# Patient Record
Sex: FEMALE | Race: WHITE | NOT HISPANIC OR LATINO | Employment: PART TIME | URBAN - METROPOLITAN AREA
[De-identification: names, ages, dates, MRNs, and addresses within clinical notes are randomized per-mention and may not be internally consistent; named-entity substitution may affect disease eponyms.]

---

## 2019-11-29 ENCOUNTER — APPOINTMENT (OUTPATIENT)
Dept: CT IMAGING | Facility: HOSPITAL | Age: 59
End: 2019-11-29

## 2019-11-29 ENCOUNTER — HOSPITAL ENCOUNTER (EMERGENCY)
Facility: HOSPITAL | Age: 59
Discharge: HOME | End: 2019-11-29
Admitting: EMERGENCY MEDICINE

## 2019-11-29 VITALS
WEIGHT: 152 LBS | RESPIRATION RATE: 18 BRPM | HEIGHT: 61 IN | OXYGEN SATURATION: 98 % | DIASTOLIC BLOOD PRESSURE: 98 MMHG | SYSTOLIC BLOOD PRESSURE: 136 MMHG | HEART RATE: 87 BPM | BODY MASS INDEX: 28.7 KG/M2 | TEMPERATURE: 98.8 F

## 2019-11-29 DIAGNOSIS — S22.080A COMPRESSION FRACTURE OF T12 VERTEBRA, INITIAL ENCOUNTER (HCC): ICD-10-CM
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
## 2019-11-29 DIAGNOSIS — S16.1XXA STRAIN OF NECK MUSCLE, INITIAL ENCOUNTER: ICD-10-CM

## 2019-11-29 DIAGNOSIS — S06.0X0A CONCUSSION WITHOUT LOSS OF CONSCIOUSNESS, INITIAL ENCOUNTER: ICD-10-CM

## 2019-11-29 LAB
ANION GAP SERPL CALCULATED.3IONS-SCNC: 12 MMOL/L (ref 5–15)
BUN BLD-MCNC: 18 MG/DL (ref 6–20)
BUN/CREAT SERPL: 27.7 (ref 7–25)
CALCIUM SPEC-SCNC: 9.6 MG/DL (ref 8.6–10.5)
CHLORIDE SERPL-SCNC: 100 MMOL/L (ref 98–107)
CO2 SERPL-SCNC: 24 MMOL/L (ref 22–29)
CREAT BLD-MCNC: 0.65 MG/DL (ref 0.57–1)
GFR SERPL CREATININE-BSD FRML MDRD: 94 ML/MIN/1.73
GLUCOSE BLD-MCNC: 266 MG/DL (ref 65–99)
POTASSIUM BLD-SCNC: 4.3 MMOL/L (ref 3.5–5.2)
SODIUM BLD-SCNC: 136 MMOL/L (ref 136–145)

## 2019-11-29 PROCEDURE — 74177 CT ABD & PELVIS W/CONTRAST: CPT

## 2019-11-29 PROCEDURE — 72125 CT NECK SPINE W/O DYE: CPT

## 2019-11-29 PROCEDURE — 0 IOPAMIDOL PER 1 ML: Performed by: PHYSICIAN ASSISTANT

## 2019-11-29 PROCEDURE — 70450 CT HEAD/BRAIN W/O DYE: CPT

## 2019-11-29 PROCEDURE — 80048 BASIC METABOLIC PNL TOTAL CA: CPT | Performed by: PHYSICIAN ASSISTANT

## 2019-11-29 PROCEDURE — 99283 EMERGENCY DEPT VISIT LOW MDM: CPT

## 2019-11-29 RX ORDER — LIDOCAINE 50 MG/G
1 PATCH TOPICAL ONCE
Status: DISCONTINUED | OUTPATIENT
Start: 2019-11-29 | End: 2019-11-29 | Stop reason: HOSPADM

## 2019-11-29 RX ORDER — HYDROCODONE BITARTRATE AND ACETAMINOPHEN 5; 325 MG/1; MG/1
1 TABLET ORAL ONCE AS NEEDED
Status: DISCONTINUED | OUTPATIENT
Start: 2019-11-29 | End: 2019-11-29 | Stop reason: HOSPADM

## 2019-11-29 RX ORDER — HYDROCODONE BITARTRATE AND ACETAMINOPHEN 7.5; 325 MG/1; MG/1
1-2 TABLET ORAL EVERY 6 HOURS PRN
Qty: 15 TABLET | Refills: 0 | Status: SHIPPED | OUTPATIENT
Start: 2019-11-29 | End: 2019-12-05 | Stop reason: SDUPTHER

## 2019-11-29 RX ORDER — SODIUM CHLORIDE 0.9 % (FLUSH) 0.9 %
10 SYRINGE (ML) INJECTION AS NEEDED
Status: DISCONTINUED | OUTPATIENT
Start: 2019-11-29 | End: 2019-11-29 | Stop reason: HOSPADM

## 2019-11-29 RX ORDER — LIDOCAINE 50 MG/G
1 PATCH TOPICAL EVERY 24 HOURS
Qty: 6 EACH | Refills: 0 | Status: SHIPPED | OUTPATIENT
Start: 2019-11-29 | End: 2019-12-22

## 2019-11-29 RX ADMIN — HYDROCODONE BITARTRATE AND ACETAMINOPHEN 1 TABLET: 5; 325 TABLET ORAL at 13:44

## 2019-11-29 RX ADMIN — IOPAMIDOL 100 ML: 755 INJECTION, SOLUTION INTRAVENOUS at 15:39

## 2019-11-29 RX ADMIN — SODIUM CHLORIDE 500 ML: 900 INJECTION, SOLUTION INTRAVENOUS at 14:18

## 2019-11-29 RX ADMIN — LIDOCAINE 1 PATCH: 50 PATCH CUTANEOUS at 13:45

## 2019-12-05 ENCOUNTER — OFFICE VISIT (OUTPATIENT)
Dept: ORTHOPEDIC SURGERY | Facility: CLINIC | Age: 59
End: 2019-12-05

## 2019-12-05 VITALS
WEIGHT: 159 LBS | HEIGHT: 61 IN | HEART RATE: 76 BPM | SYSTOLIC BLOOD PRESSURE: 144 MMHG | BODY MASS INDEX: 30.02 KG/M2 | DIASTOLIC BLOOD PRESSURE: 85 MMHG

## 2019-12-05 DIAGNOSIS — M48.54XA PATHOLOGIC COMPRESSION FRACTURE OF THORACIC VERTEBRA, INITIAL ENCOUNTER (HCC): ICD-10-CM

## 2019-12-05 DIAGNOSIS — M54.6 ACUTE BILATERAL THORACIC BACK PAIN: Primary | ICD-10-CM

## 2019-12-05 PROCEDURE — 99204 OFFICE O/P NEW MOD 45 MIN: CPT | Performed by: PHYSICIAN ASSISTANT

## 2019-12-05 RX ORDER — HYDROCODONE BITARTRATE AND ACETAMINOPHEN 7.5; 325 MG/1; MG/1
1 TABLET ORAL EVERY 6 HOURS PRN
Qty: 28 TABLET | Refills: 0 | Status: SHIPPED | OUTPATIENT
Start: 2019-12-05 | End: 2019-12-22
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
## 2019-12-10 ENCOUNTER — TELEPHONE (OUTPATIENT)
Dept: ORTHOPEDIC SURGERY | Facility: CLINIC | Age: 59
End: 2019-12-10

## 2019-12-10 NOTE — TELEPHONE ENCOUNTER
Voicemail from patient stating not heard from anyone regarding the MRI and her follow up.  Also stating that the pain medication she was given is making her sick and wanted to know if she could have a muscle relaxer instead since she is having muscle spasms.     Referral updated for scheduling to contact patient to set up MRI. Please advise regarding the muscle relaxer. Thank you!

## 2019-12-11 RX ORDER — BACLOFEN 10 MG/1
10 TABLET ORAL 3 TIMES DAILY
Qty: 60 TABLET | Refills: 0 | Status: SHIPPED | OUTPATIENT
Start: 2019-12-11 | End: 2020-01-10

## 2019-12-11 NOTE — TELEPHONE ENCOUNTER
Call placed to patient to advise script sent to pharmacy for the muscle relaxer. Advised that she does not have to continue the pain medication if she does not wish to but could try cutting in half to see if tolerate better. Advised that our scheduling dept with hospital should reach out to her to set up the MRI but she can call [phone] to set up if she would prefer. Advised that she would need to call our office back to make a follow up to review the MRI with Ed. Also advised that we recommend she continue to wear the brace until follow up if she could tolerate it. Advised to call us back with any other questions

## 2019-12-22 ENCOUNTER — HOSPITAL ENCOUNTER (OUTPATIENT)
Facility: HOSPITAL | Age: 59
Setting detail: OBSERVATION
Discharge: HOME | End: 2019-12-23
Attending: EMERGENCY MEDICINE | Admitting: INTERNAL MEDICINE

## 2019-12-22 ENCOUNTER — APPOINTMENT (OUTPATIENT)
Dept: GENERAL RADIOLOGY | Facility: HOSPITAL | Age: 59
End: 2019-12-22

## 2019-12-22 DIAGNOSIS — R07.9 CHEST PAIN, UNSPECIFIED TYPE: Primary | ICD-10-CM

## 2019-12-22 DIAGNOSIS — IMO0001 UNCONTROLLED INSULIN DEPENDENT DIABETES MELLITUS: ICD-10-CM

## 2019-12-22 LAB
ANION GAP SERPL CALCULATED.3IONS-SCNC: 12 MMOL/L (ref 5–15)
BASOPHILS # BLD AUTO: 0.1 10*3/MM3 (ref 0–0.2)
BASOPHILS NFR BLD AUTO: 1.2 % (ref 0–1.5)
BUN BLD-MCNC: 21 MG/DL (ref 6–20)
BUN/CREAT SERPL: 31.8 (ref 7–25)
CALCIUM SPEC-SCNC: 9.7 MG/DL (ref 8.6–10.5)
CHLORIDE SERPL-SCNC: 103 MMOL/L (ref 98–107)
CO2 SERPL-SCNC: 22 MMOL/L (ref 22–29)
CREAT BLD-MCNC: 0.66 MG/DL (ref 0.57–1)
D DIMER PPP FEU-MCNC: 0.3 MCGFEU/ML (ref 0.17–0.59)
DEPRECATED RDW RBC AUTO: 38.9 FL (ref 37–54)
EOSINOPHIL # BLD AUTO: 0.1 10*3/MM3 (ref 0–0.4)
EOSINOPHIL NFR BLD AUTO: 1.3 % (ref 0.3–6.2)
ERYTHROCYTE [DISTWIDTH] IN BLOOD BY AUTOMATED COUNT: 12.7 % (ref 12.3–15.4)
GFR SERPL CREATININE-BSD FRML MDRD: 92 ML/MIN/1.73
GLUCOSE BLD-MCNC: 410 MG/DL (ref 65–99)
GLUCOSE BLDC GLUCOMTR-MCNC: 226 MG/DL (ref 70–105)
GLUCOSE BLDC GLUCOMTR-MCNC: 297 MG/DL (ref 70–105)
HCT VFR BLD AUTO: 40.6 % (ref 34–46.6)
HGB BLD-MCNC: 13.9 G/DL (ref 12–15.9)
LYMPHOCYTES # BLD AUTO: 1.4 10*3/MM3 (ref 0.7–3.1)
LYMPHOCYTES NFR BLD AUTO: 22.2 % (ref 19.6–45.3)
MCH RBC QN AUTO: 29.3 PG (ref 26.6–33)
MCHC RBC AUTO-ENTMCNC: 34.3 G/DL (ref 31.5–35.7)
MCV RBC AUTO: 85.2 FL (ref 79–97)
MONOCYTES # BLD AUTO: 0.4 10*3/MM3 (ref 0.1–0.9)
MONOCYTES NFR BLD AUTO: 7 % (ref 5–12)
NEUTROPHILS # BLD AUTO: 4.1 10*3/MM3 (ref 1.7–7)
NEUTROPHILS NFR BLD AUTO: 68.3 % (ref 42.7–76)
NRBC BLD AUTO-RTO: 0 /100 WBC (ref 0–0.2)
PLATELET # BLD AUTO: 115 10*3/MM3 (ref 140–450)
PMV BLD AUTO: 9 FL (ref 6–12)
POTASSIUM BLD-SCNC: 4.3 MMOL/L (ref 3.5–5.2)
RBC # BLD AUTO: 4.76 10*6/MM3 (ref 3.77–5.28)
SODIUM BLD-SCNC: 137 MMOL/L (ref 136–145)
TROPONIN T SERPL-MCNC: <0.01 NG/ML (ref 0–0.03)
TROPONIN T SERPL-MCNC: <0.01 NG/ML (ref 0–0.03)
WBC NRBC COR # BLD: 6.1 10*3/MM3 (ref 3.4–10.8)

## 2019-12-22 PROCEDURE — 83036 HEMOGLOBIN GLYCOSYLATED A1C: CPT | Performed by: INTERNAL MEDICINE

## 2019-12-22 PROCEDURE — G0378 HOSPITAL OBSERVATION PER HR: HCPCS

## 2019-12-22 PROCEDURE — 84484 ASSAY OF TROPONIN QUANT: CPT | Performed by: EMERGENCY MEDICINE

## 2019-12-22 PROCEDURE — 99220 PR INITIAL OBSERVATION CARE/DAY 70 MINUTES: CPT | Performed by: INTERNAL MEDICINE

## 2019-12-22 PROCEDURE — 25010000002 KETOROLAC TROMETHAMINE PER 15 MG: Performed by: INTERNAL MEDICINE

## 2019-12-22 PROCEDURE — 96374 THER/PROPH/DIAG INJ IV PUSH: CPT

## 2019-12-22 PROCEDURE — 71045 X-RAY EXAM CHEST 1 VIEW: CPT

## 2019-12-22 PROCEDURE — 63710000001 INSULIN LISPRO (HUMAN) PER 5 UNITS: Performed by: INTERNAL MEDICINE

## 2019-12-22 PROCEDURE — 63710000001 INSULIN ISOPHANE & REGULAR PER 5 UNITS: Performed by: INTERNAL MEDICINE

## 2019-12-22 PROCEDURE — 99285 EMERGENCY DEPT VISIT HI MDM: CPT

## 2019-12-22 PROCEDURE — 84484 ASSAY OF TROPONIN QUANT: CPT | Performed by: INTERNAL MEDICINE

## 2019-12-22 PROCEDURE — 80048 BASIC METABOLIC PNL TOTAL CA: CPT | Performed by: EMERGENCY MEDICINE

## 2019-12-22 PROCEDURE — 63710000001 INSULIN LISPRO (HUMAN) PER 5 UNITS: Performed by: EMERGENCY MEDICINE

## 2019-12-22 PROCEDURE — 82962 GLUCOSE BLOOD TEST: CPT

## 2019-12-22 PROCEDURE — 85025 COMPLETE CBC W/AUTO DIFF WBC: CPT | Performed by: EMERGENCY MEDICINE

## 2019-12-22 PROCEDURE — 85379 FIBRIN DEGRADATION QUANT: CPT | Performed by: EMERGENCY MEDICINE

## 2019-12-22 PROCEDURE — 93005 ELECTROCARDIOGRAM TRACING: CPT | Performed by: EMERGENCY MEDICINE

## 2019-12-22 RX ORDER — ASPIRIN 81 MG/1
81 TABLET, CHEWABLE ORAL DAILY
COMMUNITY
End: 2019-12-23 | Stop reason: HOSPADM

## 2019-12-22 RX ORDER — NICOTINE POLACRILEX 4 MG
15 LOZENGE BUCCAL
Status: DISCONTINUED | OUTPATIENT
Start: 2019-12-22 | End: 2019-12-23 | Stop reason: HOSPADM

## 2019-12-22 RX ORDER — CHOLECALCIFEROL (VITAMIN D3) 125 MCG
5 CAPSULE ORAL NIGHTLY PRN
Status: DISCONTINUED | OUTPATIENT
Start: 2019-12-22 | End: 2019-12-23 | Stop reason: HOSPADM

## 2019-12-22 RX ORDER — SODIUM CHLORIDE 0.9 % (FLUSH) 0.9 %
10 SYRINGE (ML) INJECTION EVERY 12 HOURS SCHEDULED
Status: DISCONTINUED | OUTPATIENT
Start: 2019-12-22 | End: 2019-12-23 | Stop reason: HOSPADM

## 2019-12-22 RX ORDER — IBUPROFEN 800 MG/1
800 TABLET ORAL EVERY 6 HOURS PRN
COMMUNITY
End: 2020-08-19

## 2019-12-22 RX ORDER — NITROGLYCERIN 0.4 MG/1
0.4 TABLET SUBLINGUAL
Status: DISCONTINUED | OUTPATIENT
Start: 2019-12-22 | End: 2019-12-23 | Stop reason: HOSPADM

## 2019-12-22 RX ORDER — DEXTROSE MONOHYDRATE 25 G/50ML
25 INJECTION, SOLUTION INTRAVENOUS
Status: DISCONTINUED | OUTPATIENT
Start: 2019-12-22 | End: 2019-12-23 | Stop reason: HOSPADM

## 2019-12-22 RX ORDER — ACETAMINOPHEN 325 MG/1
650 TABLET ORAL EVERY 4 HOURS PRN
Status: DISCONTINUED | OUTPATIENT
Start: 2019-12-22 | End: 2019-12-23 | Stop reason: HOSPADM

## 2019-12-22 RX ORDER — ASPIRIN 81 MG/1
81 TABLET, CHEWABLE ORAL DAILY
Status: DISCONTINUED | OUTPATIENT
Start: 2019-12-22 | End: 2019-12-23 | Stop reason: HOSPADM

## 2019-12-22 RX ORDER — ACETAMINOPHEN 160 MG/5ML
650 SOLUTION ORAL EVERY 4 HOURS PRN
Status: DISCONTINUED | OUTPATIENT
Start: 2019-12-22 | End: 2019-12-23 | Stop reason: HOSPADM

## 2019-12-22 RX ORDER — ONDANSETRON 4 MG/1
4 TABLET, FILM COATED ORAL EVERY 6 HOURS PRN
Status: DISCONTINUED | OUTPATIENT
Start: 2019-12-22 | End: 2019-12-23 | Stop reason: HOSPADM

## 2019-12-22 RX ORDER — LISINOPRIL 20 MG/1
20 TABLET ORAL DAILY
Status: DISCONTINUED | OUTPATIENT
Start: 2019-12-22 | End: 2019-12-23 | Stop reason: HOSPADM

## 2019-12-22 RX ORDER — SODIUM CHLORIDE 0.9 % (FLUSH) 0.9 %
10 SYRINGE (ML) INJECTION AS NEEDED
Status: DISCONTINUED | OUTPATIENT
Start: 2019-12-22 | End: 2019-12-23 | Stop reason: HOSPADM

## 2019-12-22 RX ORDER — ACETAMINOPHEN 650 MG/1
650 SUPPOSITORY RECTAL EVERY 4 HOURS PRN
Status: DISCONTINUED | OUTPATIENT
Start: 2019-12-22 | End: 2019-12-23 | Stop reason: HOSPADM

## 2019-12-22 RX ORDER — BACLOFEN 10 MG/1
10 TABLET ORAL 3 TIMES DAILY PRN
Status: DISCONTINUED | OUTPATIENT
Start: 2019-12-22 | End: 2019-12-23 | Stop reason: HOSPADM

## 2019-12-22 RX ORDER — KETOROLAC TROMETHAMINE 30 MG/ML
30 INJECTION, SOLUTION INTRAMUSCULAR; INTRAVENOUS EVERY 6 HOURS PRN
Status: DISCONTINUED | OUTPATIENT
Start: 2019-12-22 | End: 2019-12-23 | Stop reason: HOSPADM

## 2019-12-22 RX ORDER — LISINOPRIL 20 MG/1
20 TABLET ORAL DAILY
COMMUNITY

## 2019-12-22 RX ORDER — ASPIRIN 325 MG
325 TABLET ORAL ONCE
Status: COMPLETED | OUTPATIENT
Start: 2019-12-22 | End: 2019-12-22

## 2019-12-22 RX ORDER — HYDROXYZINE HYDROCHLORIDE 25 MG/1
25 TABLET, FILM COATED ORAL 3 TIMES DAILY PRN
Status: DISCONTINUED | OUTPATIENT
Start: 2019-12-22 | End: 2019-12-23 | Stop reason: HOSPADM

## 2019-12-22 RX ADMIN — LISINOPRIL 20 MG: 20 TABLET ORAL at 17:32

## 2019-12-22 RX ADMIN — INSULIN LISPRO 6 UNITS: 100 INJECTION, SOLUTION INTRAVENOUS; SUBCUTANEOUS at 15:49

## 2019-12-22 RX ADMIN — Medication 10 ML: at 20:58

## 2019-12-22 RX ADMIN — INSULIN HUMAN 40 UNITS: 100 INJECTION, SUSPENSION SUBCUTANEOUS at 20:58

## 2019-12-22 RX ADMIN — INSULIN LISPRO 4 UNITS: 100 INJECTION, SOLUTION INTRAVENOUS; SUBCUTANEOUS at 21:01

## 2019-12-22 RX ADMIN — NITROGLYCERIN 0.4 MG: 0.4 TABLET SUBLINGUAL at 15:21

## 2019-12-22 RX ADMIN — KETOROLAC TROMETHAMINE 30 MG: 30 INJECTION, SOLUTION INTRAMUSCULAR at 17:33

## 2019-12-22 RX ADMIN — SODIUM CHLORIDE 500 ML: 900 INJECTION, SOLUTION INTRAVENOUS at 14:00

## 2019-12-22 RX ADMIN — INSULIN LISPRO 6 UNITS: 100 INJECTION, SOLUTION INTRAVENOUS; SUBCUTANEOUS at 17:33

## 2019-12-22 RX ADMIN — ASPIRIN 81 MG 81 MG: 81 TABLET ORAL at 17:32

## 2019-12-22 RX ADMIN — ASPIRIN 325 MG ORAL TABLET 325 MG: 325 PILL ORAL at 13:58

## 2019-12-22 NOTE — ED NOTES
Pt c/o of chest pain since 1200 today.  She says it feels like someone is sitting on her chest.  Pt wears a back brace for a T12 spinal fracture.      Kristyn Fajardo RN  12/22/19 [id number]
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
## 2019-12-23 ENCOUNTER — APPOINTMENT (OUTPATIENT)
Dept: NUCLEAR MEDICINE | Facility: HOSPITAL | Age: 59
End: 2019-12-23

## 2019-12-23 VITALS
DIASTOLIC BLOOD PRESSURE: 85 MMHG | RESPIRATION RATE: 20 BRPM | TEMPERATURE: 98.5 F | HEART RATE: 69 BPM | BODY MASS INDEX: 30.97 KG/M2 | HEIGHT: 61 IN | OXYGEN SATURATION: 95 % | SYSTOLIC BLOOD PRESSURE: 139 MMHG | WEIGHT: 164.02 LBS

## 2019-12-23 LAB
ANION GAP SERPL CALCULATED.3IONS-SCNC: 11 MMOL/L (ref 5–15)
BASOPHILS # BLD AUTO: 0 10*3/MM3 (ref 0–0.2)
BASOPHILS NFR BLD AUTO: 0.9 % (ref 0–1.5)
BH CV NUCLEAR PRIOR STUDY: 3
BH CV STRESS BP STAGE 1: NORMAL
BH CV STRESS BP STAGE 3: NORMAL
BH CV STRESS COMMENTS STAGE 1: NORMAL
BH CV STRESS COMMENTS STAGE 2: NORMAL
BH CV STRESS DOSE REGADENOSON STAGE 1: 0.4
BH CV STRESS DURATION MIN STAGE 1: 0
BH CV STRESS DURATION MIN STAGE 2: 4
BH CV STRESS DURATION SEC STAGE 1: 10
BH CV STRESS DURATION SEC STAGE 2: 0
BH CV STRESS HR STAGE 1: 70
BH CV STRESS HR STAGE 2: 70
BH CV STRESS HR STAGE 3: 70
BH CV STRESS PROTOCOL 1: NORMAL
BH CV STRESS RECOVERY BP: NORMAL MMHG
BH CV STRESS RECOVERY HR: 70 BPM
BH CV STRESS STAGE 1: 1
BH CV STRESS STAGE 2: 2
BH CV STRESS STAGE 3: 3
BUN BLD-MCNC: 29 MG/DL (ref 6–20)
BUN/CREAT SERPL: 48.3 (ref 7–25)
CALCIUM SPEC-SCNC: 9.9 MG/DL (ref 8.6–10.5)
CHLORIDE SERPL-SCNC: 104 MMOL/L (ref 98–107)
CO2 SERPL-SCNC: 26 MMOL/L (ref 22–29)
CREAT BLD-MCNC: 0.6 MG/DL (ref 0.57–1)
DEPRECATED RDW RBC AUTO: 38.5 FL (ref 37–54)
EOSINOPHIL # BLD AUTO: 0.1 10*3/MM3 (ref 0–0.4)
EOSINOPHIL NFR BLD AUTO: 1.6 % (ref 0.3–6.2)
ERYTHROCYTE [DISTWIDTH] IN BLOOD BY AUTOMATED COUNT: 12.8 % (ref 12.3–15.4)
GFR SERPL CREATININE-BSD FRML MDRD: 103 ML/MIN/1.73
GLUCOSE BLD-MCNC: 216 MG/DL (ref 65–99)
GLUCOSE BLDC GLUCOMTR-MCNC: 134 MG/DL (ref 70–105)
GLUCOSE BLDC GLUCOMTR-MCNC: 283 MG/DL (ref 70–105)
HBA1C MFR BLD: 10.1 % (ref 3.5–5.6)
HCT VFR BLD AUTO: 38.6 % (ref 34–46.6)
HGB BLD-MCNC: 13.1 G/DL (ref 12–15.9)
LV EF NUC BP: 56 %
LYMPHOCYTES # BLD AUTO: 1.5 10*3/MM3 (ref 0.7–3.1)
LYMPHOCYTES NFR BLD AUTO: 31.4 % (ref 19.6–45.3)
MAXIMAL PREDICTED HEART RATE: 163 BPM
MCH RBC QN AUTO: 28.5 PG (ref 26.6–33)
MCHC RBC AUTO-ENTMCNC: 33.9 G/DL (ref 31.5–35.7)
MCV RBC AUTO: 84 FL (ref 79–97)
MONOCYTES # BLD AUTO: 0.4 10*3/MM3 (ref 0.1–0.9)
MONOCYTES NFR BLD AUTO: 8.1 % (ref 5–12)
NEUTROPHILS # BLD AUTO: 2.7 10*3/MM3 (ref 1.7–7)
NEUTROPHILS NFR BLD AUTO: 58 % (ref 42.7–76)
NRBC BLD AUTO-RTO: 0.1 /100 WBC (ref 0–0.2)
PERCENT MAX PREDICTED HR: 55.21 %
PLATELET # BLD AUTO: 95 10*3/MM3 (ref 140–450)
PMV BLD AUTO: 9 FL (ref 6–12)
POTASSIUM BLD-SCNC: 3.7 MMOL/L (ref 3.5–5.2)
RBC # BLD AUTO: 4.6 10*6/MM3 (ref 3.77–5.28)
SODIUM BLD-SCNC: 141 MMOL/L (ref 136–145)
STRESS BASELINE BP: NORMAL MMHG
STRESS BASELINE HR: 70 BPM
STRESS PERCENT HR: 65 %
STRESS POST PEAK BP: NORMAL MMHG
STRESS POST PEAK HR: 90 BPM
STRESS TARGET HR: 139 BPM
TROPONIN T SERPL-MCNC: <0.01 NG/ML (ref 0–0.03)
WBC NRBC COR # BLD: 4.7 10*3/MM3 (ref 3.4–10.8)

## 2019-12-23 PROCEDURE — 63710000001 INSULIN LISPRO (HUMAN) PER 5 UNITS: Performed by: INTERNAL MEDICINE

## 2019-12-23 PROCEDURE — 93018 CV STRESS TEST I&R ONLY: CPT | Performed by: INTERNAL MEDICINE

## 2019-12-23 PROCEDURE — 63710000001 INSULIN ISOPHANE & REGULAR PER 5 UNITS: Performed by: INTERNAL MEDICINE

## 2019-12-23 PROCEDURE — 82962 GLUCOSE BLOOD TEST: CPT

## 2019-12-23 PROCEDURE — G0378 HOSPITAL OBSERVATION PER HR: HCPCS

## 2019-12-23 PROCEDURE — A9500 TC99M SESTAMIBI: HCPCS | Performed by: INTERNAL MEDICINE

## 2019-12-23 PROCEDURE — 84484 ASSAY OF TROPONIN QUANT: CPT | Performed by: INTERNAL MEDICINE

## 2019-12-23 PROCEDURE — 93017 CV STRESS TEST TRACING ONLY: CPT

## 2019-12-23 PROCEDURE — 78452 HT MUSCLE IMAGE SPECT MULT: CPT | Performed by: INTERNAL MEDICINE

## 2019-12-23 PROCEDURE — 80048 BASIC METABOLIC PNL TOTAL CA: CPT | Performed by: INTERNAL MEDICINE

## 2019-12-23 PROCEDURE — 25010000002 REGADENOSON 0.4 MG/5ML SOLUTION: Performed by: INTERNAL MEDICINE

## 2019-12-23 PROCEDURE — 93016 CV STRESS TEST SUPVJ ONLY: CPT | Performed by: NURSE PRACTITIONER

## 2019-12-23 PROCEDURE — 78452 HT MUSCLE IMAGE SPECT MULT: CPT

## 2019-12-23 PROCEDURE — 99217 PR OBSERVATION CARE DISCHARGE MANAGEMENT: CPT | Performed by: INTERNAL MEDICINE

## 2019-12-23 PROCEDURE — 0 TECHNETIUM SESTAMIBI: Performed by: INTERNAL MEDICINE

## 2019-12-23 PROCEDURE — 85025 COMPLETE CBC W/AUTO DIFF WBC: CPT | Performed by: INTERNAL MEDICINE

## 2019-12-23 RX ORDER — ASPIRIN 81 MG/1
81 TABLET, CHEWABLE ORAL DAILY
Qty: 30 TABLET | Refills: 1 | Status: SHIPPED | OUTPATIENT
Start: 2019-12-24

## 2019-12-23 RX ADMIN — LISINOPRIL 20 MG: 20 TABLET ORAL at 09:51

## 2019-12-23 RX ADMIN — ASPIRIN 81 MG 81 MG: 81 TABLET ORAL at 09:51

## 2019-12-23 RX ADMIN — TECHNETIUM TC 99M SESTAMIBI 1 DOSE: 1 INJECTION INTRAVENOUS at 08:25

## 2019-12-23 RX ADMIN — INSULIN HUMAN 30 UNITS: 100 INJECTION, SUSPENSION SUBCUTANEOUS at 10:36

## 2019-12-23 RX ADMIN — Medication 10 ML: at 09:51

## 2019-12-23 RX ADMIN — TECHNETIUM TC 99M SESTAMIBI 1 DOSE: 1 INJECTION INTRAVENOUS at 06:35

## 2019-12-23 RX ADMIN — REGADENOSON 0.4 MG: 0.08 INJECTION, SOLUTION INTRAVENOUS at 08:20

## 2019-12-23 RX ADMIN — INSULIN LISPRO 6 UNITS: 100 INJECTION, SOLUTION INTRAVENOUS; SUBCUTANEOUS at 12:03

## 2019-12-23 NOTE — NURSING NOTE
Patient did not have a primary care physician. I called the  and set the patient up with Dr Lundy on 1/7/20 @ 4pm. Will fax face sheet to office.

## 2019-12-23 NOTE — DISCHARGE SUMMARY
Date of Admission: 2019    Date of Discharge:  2019    Length of stay:  LOS: 0 days     Discharge Diagnosis: non cardiac chest pain    Presenting Problem/History of Present Illness  Active Hospital Problems    Diagnosis  POA   • **Chest pain [R07.9]  Yes      Resolved Hospital Problems   No resolved problems to display.          Hospital Course  Patient is a 59 y.o. female presented with chest pain that spontaneously resolved . D-Dimer was negative and stress myoview was normal so pt was discharged home    Past Medical History:     Past Medical History:   Diagnosis Date   • Blood sugar increased    • Cancer of unknown origin (CMS/HCC)    • Heart disease, unspecified        Past Surgical History:     Past Surgical History:   Procedure Laterality Date   • CARDIAC SURGERY     •  SECTION     • KNEE SURGERY     • NECK SURGERY         Social History:   Social History     Socioeconomic History   • Marital status:      Spouse name: Not on file   • Number of children: Not on file   • Years of education: Not on file   • Highest education level: Not on file   Tobacco Use   • Smoking status: Never Smoker   • Smokeless tobacco: Never Used   Substance and Sexual Activity   • Alcohol use: Yes     Frequency: Monthly or less     Comment: occasionally   • Drug use: Not Currently   • Sexual activity: Defer       Procedures Performed         Consults:   Consults     Date and Time Order Name Status Description    2019 1518 Hospitalist (on-call MD unless specified) Completed           Pertinent Test Results:       Lab Results (most recent)     Procedure Component Value Units Date/Time    POC Glucose Once [[id number]]  (Abnormal) Collected:  19 1125    Specimen:  Blood Updated:  19 1129     Glucose 283 mg/dL      Comment: Serial Number: [id number]:  [id number]       Hemoglobin A1c [[id number]]  (Abnormal) Collected:  19 1345    Specimen:  Blood Updated:  19 1110      Hemoglobin A1C 10.1 %     Narrative:       Hemoglobin A1C Reference Range:    <5.7 %        Normal  5.7-6.4 %     Increased risk for diabetes  > 6.4 %        Diabetes       These guidelines have been recommended by the American Diabetic Association for Hgb A1c.      The following 2010 guidelines have been recommended by the American Diabetes Association for Hemoglobin A1c.    HBA1c 5.7-6.4% Increased risk for future diabetes (pre-diabetes)  HBA1c     >6.4% Diabetes      POC Glucose Once [[id number]]  (Abnormal) Collected:  12/23/19 0724    Specimen:  Blood Updated:  12/23/19 0726     Glucose 134 mg/dL      Comment: Serial Number: [id number]:  [id number]       Troponin [[id number]]  (Normal) Collected:  12/23/19 0201    Specimen:  Blood Updated:  12/23/19 0305     Troponin T <0.010 ng/mL     Narrative:       Troponin T Reference Range:  <= 0.03 ng/mL-   Negative for AMI  >0.03 ng/mL-     Abnormal for myocardial necrosis.  Clinicians would have to utilize clinical acumen, EKG, Troponin and serial changes to determine if it is an Acute Myocardial Infarction or myocardial injury due to an underlying chronic condition.     Basic Metabolic Panel [[id number]]  (Abnormal) Collected:  12/23/19 0201    Specimen:  Blood Updated:  12/23/19 0305     Glucose 216 mg/dL      BUN 29 mg/dL      Creatinine 0.60 mg/dL      Sodium 141 mmol/L      Potassium 3.7 mmol/L      Chloride 104 mmol/L      CO2 26.0 mmol/L      Calcium 9.9 mg/dL      eGFR Non African Amer 103 mL/min/1.73      BUN/Creatinine Ratio 48.3     Anion Gap 11.0 mmol/L     Narrative:       GFR Normal >60  Chronic Kidney Disease <60  Kidney Failure <15      CBC Auto Differential [[id number]]  (Abnormal) Collected:  12/23/19 0201    Specimen:  Blood Updated:  12/23/19 0239     WBC 4.70 10*3/mm3      RBC 4.60 10*6/mm3      Hemoglobin 13.1 g/dL      Hematocrit 38.6 %      MCV 84.0 fL      MCH 28.5 pg      MCHC 33.9 g/dL      RDW 12.8 %      RDW-SD 38.5 fl      MPV 9.0 fL       Platelets 95 10*3/mm3      Neutrophil % 58.0 %      Lymphocyte % 31.4 %      Monocyte % 8.1 %      Eosinophil % 1.6 %      Basophil % 0.9 %      Neutrophils, Absolute 2.70 10*3/mm3      Lymphocytes, Absolute 1.50 10*3/mm3      Monocytes, Absolute 0.40 10*3/mm3      Eosinophils, Absolute 0.10 10*3/mm3      Basophils, Absolute 0.00 10*3/mm3      nRBC 0.1 /100 WBC     Troponin [[id number]]  (Normal) Collected:  12/22/19 2007    Specimen:  Blood Updated:  12/22/19 2117     Troponin T <0.010 ng/mL     Narrative:       Troponin T Reference Range:  <= 0.03 ng/mL-   Negative for AMI  >0.03 ng/mL-     Abnormal for myocardial necrosis.  Clinicians would have to utilize clinical acumen, EKG, Troponin and serial changes to determine if it is an Acute Myocardial Infarction or myocardial injury due to an underlying chronic condition.     Basic Metabolic Panel [[id number]]  (Abnormal) Collected:  12/22/19 1345    Specimen:  Blood Updated:  12/22/19 1422     Glucose 410 mg/dL      BUN 21 mg/dL      Creatinine 0.66 mg/dL      Sodium 137 mmol/L      Potassium 4.3 mmol/L      Chloride 103 mmol/L      CO2 22.0 mmol/L      Calcium 9.7 mg/dL      eGFR Non African Amer 92 mL/min/1.73      BUN/Creatinine Ratio 31.8     Anion Gap 12.0 mmol/L     Narrative:       GFR Normal >60  Chronic Kidney Disease <60  Kidney Failure <15      D-dimer, Quantitative [[id number]]  (Normal) Collected:  12/22/19 1345    Specimen:  Blood Updated:  12/22/19 1403     D-Dimer, Quantitative 0.30 MCGFEU/mL     Narrative:       Reference Range  --------------------------------------------------------------------     < 0.50   Negative Predictive Value  0.50-0.59   Indeterminate    >= 0.60   Probable VTE             A very low percentage of patients with DVT may yield D-Dimer results   below the cut-off of 0.50 MCGFEU/mL.  This is known to be more   prevalent in patients with distal DVT.             Results of this test should always be interpreted in conjunction with    the patient's medical history, clinical presentation and other   findings.  Clinical diagnosis should not be based on the result of   [place] D-Dimer alone.    CBC & Differential [[id number]] Collected:  12/22/19 1345    Specimen:  Blood Updated:  12/22/19 1358    Narrative:       The following orders were created for panel order CBC & Differential.  Procedure                               Abnormality         Status                     ---------                               -----------         ------                     CBC Auto Differential[[id number]]        Abnormal            Final result                 Please view results for these tests on the individual orders.    CBC Auto Differential [[id number]]  (Abnormal) Collected:  12/22/19 1345    Specimen:  Blood Updated:  12/22/19 1358     WBC 6.10 10*3/mm3      RBC 4.76 10*6/mm3      Hemoglobin 13.9 g/dL      Hematocrit 40.6 %      MCV 85.2 fL      MCH 29.3 pg      MCHC 34.3 g/dL      RDW 12.7 %      RDW-SD 38.9 fl      MPV 9.0 fL      Platelets 115 10*3/mm3      Neutrophil % 68.3 %      Lymphocyte % 22.2 %      Monocyte % 7.0 %      Eosinophil % 1.3 %      Basophil % 1.2 %      Neutrophils, Absolute 4.10 10*3/mm3      Lymphocytes, Absolute 1.40 10*3/mm3      Monocytes, Absolute 0.40 10*3/mm3      Eosinophils, Absolute 0.10 10*3/mm3      Basophils, Absolute 0.10 10*3/mm3      nRBC 0.0 /100 WBC                   Imaging Results (Most Recent)     Procedure Component Value Units Date/Time    XR Chest 1 View [[id number]] Collected:  12/22/19 1420     Updated:  12/22/19 1424    Narrative:       EXAMINATION: XR CHEST 1 VW-     DATE OF EXAM: 12/22/2019 1:50 PM     INDICATION: Chest pain     COMPARISON: Chest radiograph dated 07/15/2018     TECHNIQUE: Portable AP view of the chest was obtained.     FINDINGS:  There is a left chest wall pacemaker with leads projecting over the  right atrium and right ventricle. There is cardiomegaly. There is  evidence of a prior valve  replacement and descending thoracic aortic  endograft. There are bilateral breast implants. There is central  vascular congestion without overt edema. There is no acute infiltrate or  pleural effusion. There is partial visualization of anterior cervical  fusion hardware.       Impression:       1. Cardiomegaly with central vascular congestion. No overt edema.     Electronically Signed By-Bernard Deleon On:12/22/2019 2:21 PM  This report was finalized on [id number] by  Bernard Deleon, .          Condition on Discharge: good    Vital Signs  Temp:  [97.7 °F (36.5 °C)-98.5 °F (36.9 °C)] 98.5 °F (36.9 °C)  Heart Rate:  [69-80] 69  Resp:  [17-24] 20  BP: (102-160)/(58-86) 139/85    Physical Exam:     General Appearance:    Alert, cooperative, in no acute distress   Lungs:     Clear to auscultation,respirations regular, even and                  unlabored    Heart:    Regular rhythm and normal rate, normal S1 and S2, no            murmur, no gallop, no rub, no click   Abdomen:     Normal bowel sounds, no masses, no organomegaly, soft        non-tender, non-distended, no guarding, no rebound                tenderness   Extremities:   Moves all extremities well, no edema, no cyanosis, no             redness       Discharge Disposition  home    Discharge Medications     Discharge Medications      Changes to Medications      Instructions Start Date   baclofen 10 MG tablet  Commonly known as:  LIORESAL  What changed:    · when to take this  · reasons to take this   10 mg, Oral, 3 Times Daily         Continue These Medications      Instructions Start Date   aspirin 81 MG chewable tablet   81 mg, Oral, Daily   Start Date:  December 24, 2019     ibuprofen 800 MG tablet  Commonly known as:  ADVIL,MOTRIN   800 mg, Oral, Every 6 Hours PRN      insulin NPH-insulin regular (70-30) 100 UNIT/ML injection  Commonly known as:  humuLIN 70/30,novoLIN 70/30   50 Units, Subcutaneous, Every Morning      insulin NPH-insulin regular (70-30)  100 UNIT/ML injection  Commonly known as:  humuLIN 70/30,novoLIN 70/30   40 Units, Subcutaneous, Nightly      lisinopril 20 MG tablet  Commonly known as:  PRINIVIL,ZESTRIL   20 mg, Oral, Daily             Discharge Diet: healthy heart      Activity at Discharge: as tolerated      Follow-up Appointments PCP in one week for diabetes management  No future appointments.      Test Results Pending at Discharge       Risk for Readmission (LACE) Score: 3 (12/23/2019  6:00 AM)          Cedric Duval Jr., MD  12/23/19  12:11 PM    Time:

## 2019-12-23 NOTE — PROGRESS NOTES
Discharge Planning Assessment   [place]     Patient Name: Gladys Garrison  MRN: [id number]  Today's Date: 12/23/2019    Admit Date: 12/22/2019    Discharge Needs Assessment     Row Name 12/23/19 1309       Living Environment    Current Living Arrangements  home/apartment/condo    Primary Care Provided by  self    Family Caregiver if Needed  child(jessica), adult    Able to Return to Prior Arrangements  yes       Resource/Environmental Concerns    Resource/Environmental Concerns  none    Transportation Concerns  car, none       Transition Planning    Patient/Family Anticipates Transition to  home    Patient/Family Anticipated Services at Transition  none    Transportation Anticipated  car, drives self       Discharge Needs Assessment    Readmission Within the Last 30 Days  no previous admission in last 30 days    Concerns to be Addressed  no discharge needs identified;denies needs/concerns at this time    Equipment Currently Used at Home  glucometer    Anticipated Changes Related to Illness  none    Equipment Needed After Discharge  none    Discharge Coordination/Progress  Routine discharge home. Patient denies any needs at this time.          Discharge Plan     Row Name 12/23/19 1311       Plan    Plan  Routine discharge home. Patient denies any needs at this time.     Provided post acute provider list?  -- Not needed at this time.     Patient/Family in Agreement with Plan  yes    Plan Comments  Patient discharging today.                Expected Discharge Date and Time     Expected Discharge Date Expected Discharge Time    Dec 23, 2019         Demographic Summary     Row Name 12/23/19 1308       General Information    Admission Type  observation    Arrived From  emergency department    Referral Source  other (see comments)    Reason for Consult  discharge planning    Preferred Language  English     Used During This Interaction  no        Functional Status     Row Name 12/23/19 1308       Functional Status     Usual Activity Tolerance  good    Current Activity Tolerance  good       Functional Status, IADL    Medications  independent    Meal Preparation  independent    Housekeeping  independent    Laundry  independent    Shopping  independent       Mental Status    General Appearance WDL  WDL       Mental Status Summary    Recent Changes in Mental Status/Cognitive Functioning  no changes          Anna Naegele RN    Phone [phone]  Fax   [phone]

## 2019-12-23 NOTE — PLAN OF CARE
Problem: Patient Care Overview  Goal: Plan of Care Review  Outcome: Ongoing (interventions implemented as appropriate)  Flowsheets  Taken 12/23/2019 0326  Progress: improving  Taken 12/22/2019 2300  Plan of Care Reviewed With: patient  Goal: Individualization and Mutuality  Outcome: Ongoing (interventions implemented as appropriate)  Goal: Discharge Needs Assessment  Outcome: Ongoing (interventions implemented as appropriate)  Flowsheets  Taken 12/23/2019 0326 by Raquel Moran RN  Equipment Needed After Discharge: none  Equipment Currently Used at Home: glucometer;other (see comments) (wears back brace)  Anticipated Changes Related to Illness: none  Transportation Concerns: car, none  Concerns to be Addressed: no discharge needs identified;denies needs/concerns at this time  Readmission Within the Last 30 Days: no previous admission in last 30 days;current reason for admission unrelated to previous admission  Taken 12/22/2019 1646 by Antelmo Johnson RN  Transportation Anticipated: car, drives self  Patient/Family Anticipated Services at Transition: none  Patient/Family Anticipates Transition to: home     Problem: Cardiac: ACS (Acute Coronary Syndrome) (Adult)  Goal: Signs and Symptoms of Listed Potential Problems Will be Absent, Minimized or Managed (Cardiac: ACS)  Outcome: Ongoing (interventions implemented as appropriate)  Flowsheets (Taken 12/23/2019 0326)  Problems Assessed (Acute Coronary Syndrome): all  Problems Present (Acute Coronary Syn): none     Problem: Fall Risk (Adult)  Goal: Identify Related Risk Factors and Signs and Symptoms  Outcome: Ongoing (interventions implemented as appropriate)  Flowsheets (Taken 12/23/2019 0326)  Related Risk Factors (Fall Risk): fatigue/slow reaction; gait/mobility problems; history of falls  Signs and Symptoms (Fall Risk): presence of risk factors  Goal: Absence of Fall  Outcome: Ongoing (interventions implemented as appropriate)  Flowsheets (Taken 12/23/2019  0326)  Absence of Fall: making progress toward outcome     Problem: Pain, Chronic (Adult)  Goal: Identify Related Risk Factors and Signs and Symptoms  Outcome: Ongoing (interventions implemented as appropriate)  Flowsheets (Taken 12/23/2019 0326)  Related Risk Factors (Chronic Pain): disease process; procedures/treatments  Signs and Symptoms (Chronic Pain): verbalization of pain descriptors  Goal: Acceptable Pain/Comfort Level and Functional Ability  Outcome: Ongoing (interventions implemented as appropriate)  Flowsheets (Taken 12/23/2019 0326)  Acceptable Pain/Comfort Level and Functional Ability: making progress toward outcome

## 2019-12-23 NOTE — NURSING NOTE
Spoke with Aruna Sagastume about patient's platelet count being 95.  No orders were given at this time.
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
## 2019-12-24 ENCOUNTER — READMISSION MANAGEMENT (OUTPATIENT)
Dept: CALL CENTER | Facility: HOSPITAL | Age: 59
End: 2019-12-24

## 2019-12-24 NOTE — OUTREACH NOTE
Prep Survey      Responses   Facility patient discharged from?  [facility]   Is patient eligible?  Yes   Discharge diagnosis  chest pain   Does the patient have one of the following disease processes/diagnoses(primary or secondary)?  Other   Does the patient have Home health ordered?  No   Is there a DME ordered?  No   Prep survey completed?  Yes          Jo Ann Clayton RN

## 2019-12-26 ENCOUNTER — READMISSION MANAGEMENT (OUTPATIENT)
Dept: CALL CENTER | Facility: HOSPITAL | Age: 59
End: 2019-12-26

## 2019-12-27 NOTE — OUTREACH NOTE
Medical Week 1 Survey      Responses   Facility patient discharged from?  [facility]   Does the patient have one of the following disease processes/diagnoses(primary or secondary)?  Other   Is there a successful TCM telephone encounter documented?  No   Week 1 attempt successful?  Yes   Call start time  1924   Call end time  1927   Discharge diagnosis  chest pain   Does the patient have all medications ordered at discharge?  N/A   Is the patient taking all medications as directed (includes completed medication regime)?  Yes   Does the patient have a primary care provider?   No   Does the patient have an appointment with their PCP within 7 days of discharge?  Greater than 7 days   Comments regarding PCP  Patient states they set her up with a new PCP and she has an appt in Jan.   What is preventing the patient from scheduling follow up appointments within 7 days of discharge?  Earlier appointment not available   Has the patient kept scheduled appointments due by today?  N/A   Comments  Patient states she also needs to follow up with the neuro surgeon but the one she was scheduled with left the practice and had to reschedule with a new doctor   Has home health visited the patient within 72 hours of discharge?  N/A   Did the patient receive a copy of their discharge instructions?  Yes   Nursing interventions  Reviewed instructions with patient   What is the patient's perception of their health status since discharge?  Same   Is the patient/caregiver able to teach back the hierarchy of who to call/visit for symptoms/problems? PCP, Specialist, Home health nurse, Urgent Care, ED, 911  Yes   Additional teach back comments  Patient states she is still feeling the same and will see what the new doctor says.   Week 1 call completed?  Yes   Graduated  Yes   Graduated/Revoked comments  No questions or needs at this time          Kylie Haq LPN

## 2020-01-02 ENCOUNTER — TELEPHONE (OUTPATIENT)
Dept: NEUROSURGERY | Facility: CLINIC | Age: 60
End: 2020-01-02

## 2020-01-02 RX ORDER — BACLOFEN 10 MG/1
TABLET ORAL
Qty: 60 TABLET | Refills: 0 | OUTPATIENT
Start: 2020-01-02
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
## 2020-01-10 ENCOUNTER — OFFICE VISIT (OUTPATIENT)
Dept: FAMILY MEDICINE CLINIC | Facility: CLINIC | Age: 60
End: 2020-01-10

## 2020-01-10 VITALS
DIASTOLIC BLOOD PRESSURE: 88 MMHG | HEIGHT: 61 IN | BODY MASS INDEX: 30.66 KG/M2 | RESPIRATION RATE: 20 BRPM | TEMPERATURE: 97.5 F | HEART RATE: 70 BPM | WEIGHT: 162.4 LBS | SYSTOLIC BLOOD PRESSURE: 150 MMHG

## 2020-01-10 DIAGNOSIS — R07.9 CHEST PAIN, UNSPECIFIED TYPE: ICD-10-CM

## 2020-01-10 DIAGNOSIS — S22.080A COMPRESSION FRACTURE OF T12 VERTEBRA, INITIAL ENCOUNTER (HCC): Primary | ICD-10-CM

## 2020-01-10 DIAGNOSIS — E11.65 TYPE 2 DIABETES MELLITUS WITH HYPERGLYCEMIA, WITH LONG-TERM CURRENT USE OF INSULIN (HCC): ICD-10-CM

## 2020-01-10 DIAGNOSIS — Z79.4 TYPE 2 DIABETES MELLITUS WITH HYPERGLYCEMIA, WITH LONG-TERM CURRENT USE OF INSULIN (HCC): ICD-10-CM

## 2020-01-10 DIAGNOSIS — M62.830 MUSCLE SPASM OF BACK: ICD-10-CM

## 2020-01-10 PROCEDURE — 99214 OFFICE O/P EST MOD 30 MIN: CPT | Performed by: INTERNAL MEDICINE

## 2020-01-10 RX ORDER — CALCIUM CARB/VITAMIN D3/VIT K1 500-100-40
1 TABLET,CHEWABLE ORAL 2 TIMES DAILY
Qty: 100 EACH | Refills: 1 | Status: SHIPPED | OUTPATIENT
Start: 2020-01-10 | End: 2021-09-01

## 2020-01-10 RX ORDER — DIAZEPAM 2 MG/1
2 TABLET ORAL 2 TIMES DAILY PRN
Qty: 30 TABLET | Refills: 0 | Status: SHIPPED | OUTPATIENT
Start: 2020-01-10 | End: 2020-08-19
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
## 2020-01-13 ENCOUNTER — TELEPHONE (OUTPATIENT)
Dept: FAMILY MEDICINE CLINIC | Facility: CLINIC | Age: 60
End: 2020-01-13

## 2020-01-13 NOTE — TELEPHONE ENCOUNTER
Pt forgot to get dr's note when she was in on Friday states she is medically cleared to RTW. Needs to be emailed to [email or web address] Thank you.

## 2020-01-13 NOTE — TELEPHONE ENCOUNTER
Oh, I thought she still had been working/released already. Does she need a date on it of when she's allowed to go back to work?

## 2020-01-14 NOTE — TELEPHONE ENCOUNTER
Pt states she feel the morning of her appointment with you (1/10/20) and her leg stopped working. She is asking if doctor's note could state she is medically released to RTW 1/15/20. Thank you.

## 2020-01-17 DIAGNOSIS — M48.54XA PATHOLOGICAL COMPRESSION FRACTURE OF THORACIC VERTEBRA, INITIAL ENCOUNTER (HCC): Primary | ICD-10-CM

## 2020-01-21 ENCOUNTER — TELEPHONE (OUTPATIENT)
Dept: FAMILY MEDICINE CLINIC | Facility: CLINIC | Age: 60
End: 2020-01-21

## 2020-01-21 NOTE — TELEPHONE ENCOUNTER
Patient called stating that she could not get in to see Dr. Kaba until 2/10. Patient states that she cannot keep falling and is asking what you would suggest. Patient states that she has fallen 2 more times, once out of the chair and the other time she fell down the stairs.

## 2020-02-06 ENCOUNTER — OFFICE VISIT (OUTPATIENT)
Dept: NEUROSURGERY | Facility: CLINIC | Age: 60
End: 2020-02-06

## 2020-02-06 VITALS
WEIGHT: 168 LBS | BODY MASS INDEX: 31.72 KG/M2 | HEIGHT: 61 IN | RESPIRATION RATE: 18 BRPM | SYSTOLIC BLOOD PRESSURE: 148 MMHG | DIASTOLIC BLOOD PRESSURE: 86 MMHG

## 2020-02-06 DIAGNOSIS — Z85.3 HX OF BREAST CANCER: ICD-10-CM

## 2020-02-06 DIAGNOSIS — M54.2 NECK PAIN: ICD-10-CM

## 2020-02-06 DIAGNOSIS — M48.54XA PATHOLOGICAL COMPRESSION FRACTURE OF THORACIC VERTEBRA, INITIAL ENCOUNTER (HCC): Primary | ICD-10-CM

## 2020-02-06 PROCEDURE — 99203 OFFICE O/P NEW LOW 30 MIN: CPT | Performed by: NEUROLOGICAL SURGERY

## 2020-03-16 ENCOUNTER — TELEPHONE (OUTPATIENT)
Dept: NEUROSURGERY | Facility: CLINIC | Age: 60
End: 2020-03-16

## 2020-03-16 ENCOUNTER — OFFICE VISIT (OUTPATIENT)
Dept: FAMILY MEDICINE CLINIC | Facility: CLINIC | Age: 60
End: 2020-03-16

## 2020-03-16 VITALS
TEMPERATURE: 98.7 F | HEIGHT: 61 IN | HEART RATE: 83 BPM | RESPIRATION RATE: 8 BRPM | SYSTOLIC BLOOD PRESSURE: 140 MMHG | OXYGEN SATURATION: 96 % | WEIGHT: 165 LBS | BODY MASS INDEX: 31.15 KG/M2 | DIASTOLIC BLOOD PRESSURE: 80 MMHG

## 2020-03-16 DIAGNOSIS — J06.9 ACUTE URI: Primary | ICD-10-CM

## 2020-03-16 PROCEDURE — 99213 OFFICE O/P EST LOW 20 MIN: CPT | Performed by: NURSE PRACTITIONER

## 2020-03-16 PROCEDURE — 96372 THER/PROPH/DIAG INJ SC/IM: CPT | Performed by: NURSE PRACTITIONER

## 2020-03-16 RX ORDER — BENZONATATE 200 MG/1
200 CAPSULE ORAL 3 TIMES DAILY PRN
Qty: 30 CAPSULE | Refills: 1 | Status: SHIPPED | OUTPATIENT
Start: 2020-03-16 | End: 2020-03-29

## 2020-03-16 RX ORDER — TRIAMCINOLONE ACETONIDE 40 MG/ML
80 INJECTION, SUSPENSION INTRA-ARTICULAR; INTRAMUSCULAR ONCE
Status: COMPLETED | OUTPATIENT
Start: 2020-03-16 | End: 2020-03-16

## 2020-03-16 RX ADMIN — TRIAMCINOLONE ACETONIDE 80 MG: 40 INJECTION, SUSPENSION INTRA-ARTICULAR; INTRAMUSCULAR at 15:46

## 2020-03-16 NOTE — PROGRESS NOTES
"Kenia Garrison is a 59 y.o. female.     Chief Complaint   Patient presents with   • URI     runny nose       /80 (BP Location: Right arm, Patient Position: Sitting, Cuff Size: Large Adult)   Pulse 83   Temp 98.7 °F (37.1 °C) (Oral)   Resp 8   Ht 154.9 cm (61\")   Wt 74.8 kg (165 lb)   SpO2 96%   BMI 31.18 kg/m²     BP Readings from Last 3 Encounters:   03/16/20 140/80   02/06/20 148/86   01/29/20 142/84       Wt Readings from Last 3 Encounters:   03/16/20 74.8 kg (165 lb)   02/06/20 76.2 kg (168 lb)   01/29/20 68.9 kg (152 lb)       Pt comes in today with c/o sore throat, cough, congestion, nausea, upset stomach, runny nose, body aches. No fever or chills. No HA.   Symptoms started 2 days ago. Granddaughter tested pos for FLU A. Grandson was also sick.   Taking tylenol and IBU.        The following portions of the patient's history were reviewed and updated as appropriate: allergies, current medications, past family history, past medical history, past social history, past surgical history and problem list.    Review of Systems   Constitutional: Positive for fatigue. Negative for chills and fever.   HENT: Positive for congestion, rhinorrhea, sneezing and sore throat. Negative for ear pain, postnasal drip, sinus pressure and swollen glands.    Respiratory: Positive for cough. Negative for chest tightness, shortness of breath and wheezing.    Gastrointestinal: Negative for nausea and vomiting.   Musculoskeletal: Positive for myalgias.   Neurological: Negative for headache.       Objective   Physical Exam   Constitutional: She is oriented to person, place, and time. She appears well-developed and well-nourished.   Eyes: Pupils are equal, round, and reactive to light.   Cardiovascular: Normal rate and regular rhythm.   Pulmonary/Chest: Effort normal and breath sounds normal. No respiratory distress. She has no decreased breath sounds. She has no wheezes. She has no rhonchi.   Harsh cough.  "   Neurological: She is alert and oriented to person, place, and time.         Diagnoses and all orders for this visit:    1. Acute URI (Primary)  Comments:  unable to test for FLU 2/2 covid 19 outbreak and ceasing of FLU testing. Will treat for URI, give kenalog inj, mucinex otc, tessalon perles prn. Tylenol/IBU prn  Orders:  -     benzonatate (TESSALON) 200 MG capsule; Take 1 capsule by mouth 3 (Three) Times a Day As Needed for Cough for up to 13 days.  Dispense: 30 capsule; Refill: 1  -     triamcinolone acetonide (KENALOG-40) injection 80 mg    push fluids  Rest  Tylenol/IBU   mucinex otc  Tessalon perles prn  Kenalog inj today  During this office visit, we discussed the pertinent aspects of the visit and treatment recommendations. Pt verbalizes understanding. Follow up was discussed. Patient was given the opportunity to ask questions and discuss other concerns.       Return if symptoms worsen or fail to improve.

## 2020-03-16 NOTE — TELEPHONE ENCOUNTER
Pt called in ready to schedule F/U appt.  She states she had MRI completed, but I only see the Cervical and Thoracic MRI in chart. Looks like Lumbar was cancelled in December 2019 and never scheduled in January 2020?     I wanted to clarify all tests needed were complete. Please advise: [phone]

## 2020-03-17 ENCOUNTER — TELEPHONE (OUTPATIENT)
Dept: NEUROSURGERY | Facility: CLINIC | Age: 60
End: 2020-03-17

## 2020-06-08 ENCOUNTER — OFFICE VISIT (OUTPATIENT)
Dept: NEUROSURGERY | Facility: CLINIC | Age: 60
End: 2020-06-08

## 2020-06-08 VITALS
DIASTOLIC BLOOD PRESSURE: 87 MMHG | HEIGHT: 61 IN | HEART RATE: 78 BPM | WEIGHT: 156 LBS | BODY MASS INDEX: 29.45 KG/M2 | SYSTOLIC BLOOD PRESSURE: 154 MMHG

## 2020-06-08 DIAGNOSIS — M47.12 CERVICAL SPONDYLOSIS WITH MYELOPATHY: ICD-10-CM

## 2020-06-08 DIAGNOSIS — M54.2 NECK PAIN: Primary | ICD-10-CM

## 2020-06-08 DIAGNOSIS — M48.54XA PATHOLOGICAL COMPRESSION FRACTURE OF THORACIC VERTEBRA, INITIAL ENCOUNTER (HCC): ICD-10-CM

## 2020-06-08 PROCEDURE — 99213 OFFICE O/P EST LOW 20 MIN: CPT | Performed by: NEUROLOGICAL SURGERY

## 2020-06-08 NOTE — PROGRESS NOTES
"Subjective   Gladys Garrison is a 59 y.o. female.     Chief Complaint   Patient presents with   • Back Pain     thoracic spine pain     Visit Vitals  /87 (BP Location: Right arm, Patient Position: Sitting, Cuff Size: Adult)   Pulse 78   Ht 154.9 cm (61\")   Wt 70.8 kg (156 lb)   BMI 29.48 kg/m²       History of Present Illness: Mrs. Cevallos is here today for follow-up review of her MRI of the cervical, thoracic, lumbar spine.  Since last encounter she states that the midthoracic pain is actually getting a little bit better.  She is really concerned that her neck pain and the numbness and tingling is slowly dropping things.  Reviewed the MRI of the cervical spine and there is full deformity and sagittal imbalance.  There is a listhesis on the x-rays on flexion-extension today she does not see any movements approximately 3 mm.  There is subtle evidence of cord draping over the area above the prior arthrodesis.  Final measurements of the K line she is somewhat positive though she does have a true OPLL I feel she is suffering from dynamic compression.  Is hard to evaluate the current films therefore quality.  There is a chronic compression fracture T12 with slight edematous signs of contrast-enhancement endplate and facet.  On standing flexion-extension films she is 12 mm focal kyphosis in extension which advances to 18 degrees of flexion.  I consider this to be somewhat stable and trivial.  There is no obvious cord compression is no abnormal signal characteristics of the bar or abnormal contrast-enhancement suggestive of myelopathic disease.  Her last hemoglobin A1c was 10.5.  She is scheduled to see her endocrinologist again in 2 months after adjusting her medications.  At this time I would like to obtain a CT myelogram cervical spine to better evaluate for the fusion and the cord compression which is hard to see on MRI.  I do not believe the lumbar thoracic spine need to be addressed surgically.  I did discuss " with her the possible need for posterior cervical fusion with decompression and need for correction of her sagittal balance.  I did tell her that I would not do this until her hemoglobin is 7.0 is under better control and I do feel some of her symptoms are related to her neuropathy.  I will see her back in 2 months after the CT myelogram and repeat hemoglobin A1c is.    The following portions of the patient's history were reviewed and updated as appropriate: allergies, current medications, past family history, past medical history, past social history, past surgical history and problem list.    Review of Systems         Past Surgical History:   Procedure Laterality Date   • BREAST RECONSTRUCTION Bilateral    • CARDIAC ABLATION      atrial tachycardia   • CARDIAC SURGERY      stent placed in aorta   •  SECTION     • KNEE SURGERY     • MASTECTOMY Bilateral    • NECK SURGERY     • PACEMAKER IMPLANTATION         Past Medical History:   Diagnosis Date   • Breast cancer (CMS/HCC) 2017    mets to lymph nodes; did not do radiation   • Cancer of unknown origin (CMS/HCC)    • Compression fx, thoracic spine, open, initial encounter (CMS/HCC)    • Diabetes mellitus (CMS/HCC)    • Heart disease, unspecified    • Hepatitis C      Social History     Socioeconomic History   • Marital status:      Spouse name: Not on file   • Number of children: Not on file   • Years of education: Not on file   • Highest education level: Not on file   Tobacco Use   • Smoking status: Never Smoker   • Smokeless tobacco: Never Used   Substance and Sexual Activity   • Alcohol use: Yes     Frequency: Monthly or less     Comment: occasionally   • Drug use: Not Currently   • Sexual activity: Defer   Social History Narrative        So in: Lives with brother in law and     Lives 1/2 time in [place]      Family History   Family history unknown: Yes          Objective   Physical Exam  Neurologic Exam  Ortho  Exam        Assessment and Plan: I spent 15 minutes with the patient in direct face-to-face time with greater than 50% time counseling educated patient.      Problems Addressed this Visit     None

## 2020-07-06 ENCOUNTER — HOSPITAL ENCOUNTER (OUTPATIENT)
Dept: GENERAL RADIOLOGY | Facility: HOSPITAL | Age: 60
Discharge: HOME | End: 2020-07-06
Admitting: RADIOLOGY

## 2020-07-06 ENCOUNTER — HOSPITAL ENCOUNTER (OUTPATIENT)
Dept: CT IMAGING | Facility: HOSPITAL | Age: 60
Discharge: HOME | End: 2020-07-06

## 2020-07-06 VITALS
WEIGHT: 150 LBS | TEMPERATURE: 98.5 F | OXYGEN SATURATION: 96 % | RESPIRATION RATE: 16 BRPM | BODY MASS INDEX: 28.32 KG/M2 | SYSTOLIC BLOOD PRESSURE: 136 MMHG | HEIGHT: 61 IN | DIASTOLIC BLOOD PRESSURE: 70 MMHG | HEART RATE: 70 BPM

## 2020-07-06 DIAGNOSIS — M47.12 CERVICAL SPONDYLOSIS WITH MYELOPATHY: ICD-10-CM

## 2020-07-06 DIAGNOSIS — M54.2 NECK PAIN: ICD-10-CM

## 2020-07-06 LAB
APTT PPP: 25 SECONDS (ref 24–31)
BASOPHILS # BLD AUTO: 0 10*3/MM3 (ref 0–0.2)
BASOPHILS NFR BLD AUTO: 0.3 % (ref 0–1.5)
DEPRECATED RDW RBC AUTO: 43.8 FL (ref 37–54)
EOSINOPHIL # BLD AUTO: 0 10*3/MM3 (ref 0–0.4)
EOSINOPHIL NFR BLD AUTO: 0.1 % (ref 0.3–6.2)
ERYTHROCYTE [DISTWIDTH] IN BLOOD BY AUTOMATED COUNT: 14.2 % (ref 12.3–15.4)
HCT VFR BLD AUTO: 41 % (ref 34–46.6)
HGB BLD-MCNC: 13.7 G/DL (ref 12–15.9)
INR PPP: 0.94 (ref 0.9–1.1)
LYMPHOCYTES # BLD AUTO: 0.7 10*3/MM3 (ref 0.7–3.1)
LYMPHOCYTES NFR BLD AUTO: 10.1 % (ref 19.6–45.3)
MCH RBC QN AUTO: 28.9 PG (ref 26.6–33)
MCHC RBC AUTO-ENTMCNC: 33.4 G/DL (ref 31.5–35.7)
MCV RBC AUTO: 86.6 FL (ref 79–97)
MONOCYTES # BLD AUTO: 0.4 10*3/MM3 (ref 0.1–0.9)
MONOCYTES NFR BLD AUTO: 6 % (ref 5–12)
NEUTROPHILS NFR BLD AUTO: 6.1 10*3/MM3 (ref 1.7–7)
NEUTROPHILS NFR BLD AUTO: 83.5 % (ref 42.7–76)
NRBC BLD AUTO-RTO: 0 /100 WBC (ref 0–0.2)
PLATELET # BLD AUTO: 112 10*3/MM3 (ref 140–450)
PMV BLD AUTO: 8.1 FL (ref 6–12)
PROTHROMBIN TIME: 10 SECONDS (ref 9.6–11.7)
RBC # BLD AUTO: 4.73 10*6/MM3 (ref 3.77–5.28)
WBC # BLD AUTO: 7.3 10*3/MM3 (ref 3.4–10.8)

## 2020-07-06 PROCEDURE — 72126 CT NECK SPINE W/DYE: CPT

## 2020-07-06 PROCEDURE — 62302 MYELOGRAPHY LUMBAR INJECTION: CPT

## 2020-07-06 PROCEDURE — 72240 MYELOGRAPHY NECK SPINE: CPT

## 2020-07-06 PROCEDURE — 85610 PROTHROMBIN TIME: CPT | Performed by: RADIOLOGY

## 2020-07-06 PROCEDURE — 85730 THROMBOPLASTIN TIME PARTIAL: CPT | Performed by: RADIOLOGY

## 2020-07-06 PROCEDURE — 85025 COMPLETE CBC W/AUTO DIFF WBC: CPT | Performed by: RADIOLOGY

## 2020-07-06 PROCEDURE — 25010000002 IOPAMIDOL 61 % SOLUTION: Performed by: NEUROLOGICAL SURGERY

## 2020-07-06 RX ADMIN — IOPAMIDOL 15 ML: 612 INJECTION, SOLUTION INTRATHECAL at 12:30

## 2020-07-06 NOTE — DISCHARGE INSTRUCTIONS
A responsible adult should stay with you and you should rest quietly for the rest of the day. Do not drink alcohol, drive or cook for 24 hours following your procedure.  Progress your diet as tolerated.  Increase your fluid intake over the next 24 hours.  Resume your usually medications including aspirin.  When you remove your dressing in 24 hours, a small amount of blood is to be expected. Do not be alarmed.  If you feel it is bleeding excessively apply pressure and proceed to the Emergency room.  Do not shower, bath, or get your dressing wet at all for 24 hours.  You may shower after the dressing is removed. Keep your head elevated at least 30 degrees for the next 24 hours. No lifting more that 10 pounds for 24 hours.  If severe pain or headache, increased shortness of air or racing heartbeat occur, seek immediate medical attention.  Follow up with Dr. Kaba for results.

## 2020-07-20 ENCOUNTER — OFFICE VISIT (OUTPATIENT)
Dept: FAMILY MEDICINE CLINIC | Facility: CLINIC | Age: 60
End: 2020-07-20

## 2020-07-20 ENCOUNTER — LAB (OUTPATIENT)
Dept: FAMILY MEDICINE CLINIC | Facility: CLINIC | Age: 60
End: 2020-07-20

## 2020-07-20 VITALS
TEMPERATURE: 97.1 F | DIASTOLIC BLOOD PRESSURE: 100 MMHG | SYSTOLIC BLOOD PRESSURE: 142 MMHG | HEIGHT: 61 IN | BODY MASS INDEX: 30.96 KG/M2 | HEART RATE: 87 BPM | WEIGHT: 164 LBS | OXYGEN SATURATION: 96 % | RESPIRATION RATE: 8 BRPM

## 2020-07-20 DIAGNOSIS — Z79.4 TYPE 2 DIABETES MELLITUS WITH HYPERGLYCEMIA, WITH LONG-TERM CURRENT USE OF INSULIN (HCC): Primary | ICD-10-CM

## 2020-07-20 DIAGNOSIS — E11.65 TYPE 2 DIABETES MELLITUS WITH HYPERGLYCEMIA, WITH LONG-TERM CURRENT USE OF INSULIN (HCC): ICD-10-CM

## 2020-07-20 DIAGNOSIS — I10 ESSENTIAL HYPERTENSION: ICD-10-CM

## 2020-07-20 DIAGNOSIS — E11.65 TYPE 2 DIABETES MELLITUS WITH HYPERGLYCEMIA, WITH LONG-TERM CURRENT USE OF INSULIN (HCC): Primary | ICD-10-CM

## 2020-07-20 DIAGNOSIS — Z79.4 TYPE 2 DIABETES MELLITUS WITH HYPERGLYCEMIA, WITH LONG-TERM CURRENT USE OF INSULIN (HCC): ICD-10-CM

## 2020-07-20 DIAGNOSIS — E78.2 MIXED HYPERLIPIDEMIA: ICD-10-CM

## 2020-07-20 DIAGNOSIS — B18.2 CHRONIC HEPATITIS C WITHOUT HEPATIC COMA (HCC): ICD-10-CM

## 2020-07-20 PROBLEM — I07.1 TRICUSPID VALVE REGURGITATION: Status: ACTIVE | Noted: 2017-09-13

## 2020-07-20 PROBLEM — Q21.3 TETRALOGY OF FALLOT: Status: ACTIVE | Noted: 2019-07-11

## 2020-07-20 PROBLEM — I37.9 PULMONARY VALVE DISORDER: Status: ACTIVE | Noted: 2017-09-13

## 2020-07-20 PROBLEM — C50.919 MALIGNANT TUMOR OF BREAST: Status: ACTIVE | Noted: 2017-09-01

## 2020-07-20 PROBLEM — I27.20 PULMONARY HYPERTENSION: Status: ACTIVE | Noted: 2017-09-13

## 2020-07-20 LAB
ALBUMIN SERPL-MCNC: 4.5 G/DL (ref 3.5–5.2)
ALBUMIN/GLOB SERPL: 1.3 G/DL
ALP SERPL-CCNC: 95 U/L (ref 39–117)
ALT SERPL W P-5'-P-CCNC: 79 U/L (ref 1–33)
ANION GAP SERPL CALCULATED.3IONS-SCNC: 13.4 MMOL/L (ref 5–15)
AST SERPL-CCNC: 51 U/L (ref 1–32)
BASOPHILS # BLD AUTO: 0.03 10*3/MM3 (ref 0–0.2)
BASOPHILS NFR BLD AUTO: 0.4 % (ref 0–1.5)
BILIRUB SERPL-MCNC: 0.6 MG/DL (ref 0–1.2)
BUN SERPL-MCNC: 16 MG/DL (ref 6–20)
BUN/CREAT SERPL: 21.3 (ref 7–25)
CALCIUM SPEC-SCNC: 10.3 MG/DL (ref 8.6–10.5)
CHLORIDE SERPL-SCNC: 102 MMOL/L (ref 98–107)
CHOLEST SERPL-MCNC: 178 MG/DL (ref 0–200)
CO2 SERPL-SCNC: 25.6 MMOL/L (ref 22–29)
CREAT SERPL-MCNC: 0.75 MG/DL (ref 0.57–1)
DEPRECATED RDW RBC AUTO: 39.1 FL (ref 37–54)
EOSINOPHIL # BLD AUTO: 0.07 10*3/MM3 (ref 0–0.4)
EOSINOPHIL NFR BLD AUTO: 0.9 % (ref 0.3–6.2)
ERYTHROCYTE [DISTWIDTH] IN BLOOD BY AUTOMATED COUNT: 12.5 % (ref 12.3–15.4)
GFR SERPL CREATININE-BSD FRML MDRD: 80 ML/MIN/1.73
GLOBULIN UR ELPH-MCNC: 3.4 GM/DL
GLUCOSE SERPL-MCNC: 61 MG/DL (ref 65–99)
HBA1C MFR BLD: 7.8 % (ref 3.5–5.6)
HCT VFR BLD AUTO: 43.9 % (ref 34–46.6)
HDLC SERPL-MCNC: 64 MG/DL (ref 40–60)
HGB BLD-MCNC: 14.7 G/DL (ref 12–15.9)
IMM GRANULOCYTES # BLD AUTO: 0.05 10*3/MM3 (ref 0–0.05)
IMM GRANULOCYTES NFR BLD AUTO: 0.7 % (ref 0–0.5)
LDLC SERPL CALC-MCNC: 101 MG/DL (ref 0–100)
LDLC/HDLC SERPL: 1.57 {RATIO}
LYMPHOCYTES # BLD AUTO: 1.5 10*3/MM3 (ref 0.7–3.1)
LYMPHOCYTES NFR BLD AUTO: 20.2 % (ref 19.6–45.3)
MCH RBC QN AUTO: 28.7 PG (ref 26.6–33)
MCHC RBC AUTO-ENTMCNC: 33.5 G/DL (ref 31.5–35.7)
MCV RBC AUTO: 85.7 FL (ref 79–97)
MONOCYTES # BLD AUTO: 0.48 10*3/MM3 (ref 0.1–0.9)
MONOCYTES NFR BLD AUTO: 6.5 % (ref 5–12)
NEUTROPHILS NFR BLD AUTO: 5.28 10*3/MM3 (ref 1.7–7)
NEUTROPHILS NFR BLD AUTO: 71.3 % (ref 42.7–76)
NRBC BLD AUTO-RTO: 0 /100 WBC (ref 0–0.2)
PLATELET # BLD AUTO: 137 10*3/MM3 (ref 140–450)
PMV BLD AUTO: 10.8 FL (ref 6–12)
POTASSIUM SERPL-SCNC: 4.5 MMOL/L (ref 3.5–5.2)
PROT SERPL-MCNC: 7.9 G/DL (ref 6–8.5)
RBC # BLD AUTO: 5.12 10*6/MM3 (ref 3.77–5.28)
SODIUM SERPL-SCNC: 141 MMOL/L (ref 136–145)
TRIGL SERPL-MCNC: 67 MG/DL (ref 0–150)
TSH SERPL DL<=0.05 MIU/L-ACNC: 2.37 UIU/ML (ref 0.27–4.2)
VLDLC SERPL-MCNC: 13.4 MG/DL (ref 5–40)
WBC # BLD AUTO: 7.41 10*3/MM3 (ref 3.4–10.8)

## 2020-07-20 PROCEDURE — 80053 COMPREHEN METABOLIC PANEL: CPT | Performed by: NURSE PRACTITIONER

## 2020-07-20 PROCEDURE — 85025 COMPLETE CBC W/AUTO DIFF WBC: CPT | Performed by: NURSE PRACTITIONER

## 2020-07-20 PROCEDURE — 84443 ASSAY THYROID STIM HORMONE: CPT | Performed by: NURSE PRACTITIONER

## 2020-07-20 PROCEDURE — 80061 LIPID PANEL: CPT | Performed by: NURSE PRACTITIONER

## 2020-07-20 PROCEDURE — 99214 OFFICE O/P EST MOD 30 MIN: CPT | Performed by: NURSE PRACTITIONER

## 2020-07-20 PROCEDURE — 83036 HEMOGLOBIN GLYCOSYLATED A1C: CPT | Performed by: NURSE PRACTITIONER

## 2020-07-20 NOTE — PROGRESS NOTES
"Kenia Garrison is a 59 y.o. female.     Chief Complaint   Patient presents with   • Hep C care       /100   Pulse 87   Temp 97.1 °F (36.2 °C) (Temporal)   Resp 8   Ht 154.9 cm (61\")   Wt 74.4 kg (164 lb)   SpO2 96%   BMI 30.99 kg/m²     BP Readings from Last 3 Encounters:   07/20/20 142/100   07/06/20 136/70   06/08/20 154/87       Wt Readings from Last 3 Encounters:   07/20/20 74.4 kg (164 lb)   07/06/20 68 kg (150 lb)   06/08/20 70.8 kg (156 lb)       Pt comes in today for follow up on DM. Hasn't had A1C checked since Dec and it was 10.1 at that time. She is prescribed novolin 70/30 and is taking 50 units QAM and 40units QPM. Never saw endo for this.   Not checking BS at home.     She sees cardiology in [place] and is due for a follow up. BP is elevated today. Doesn't believe she has taken her meds for several days.     Also asking for referral to be treated for Hep C. Was referred somewhere in [place] a couple of years ago, but never went.        The following portions of the patient's history were reviewed and updated as appropriate: allergies, current medications, past family history, past medical history, past social history, past surgical history and problem list.    Review of Systems   Constitutional: Negative for fatigue.   Eyes: Negative for blurred vision.   Respiratory: Negative for shortness of breath.    Cardiovascular: Negative for chest pain and palpitations.   Endocrine: Negative for polydipsia and polyuria.   Neurological: Negative for dizziness and headache.       Objective   Physical Exam   Constitutional: She is oriented to person, place, and time. She appears well-developed and well-nourished.   Eyes: Pupils are equal, round, and reactive to light.   Cardiovascular: Normal rate and regular rhythm.   Pulmonary/Chest: Effort normal and breath sounds normal.   Neurological: She is alert and oriented to person, place, and time.   Psychiatric: She has a normal mood " and affect. Her behavior is normal.         Diagnoses and all orders for this visit:    1. Type 2 diabetes mellitus with hyperglycemia, with long-term current use of insulin (CMS/HCC) (Primary)  -     Comprehensive Metabolic Panel; Future  -     CBC & Differential; Future  -     Lipid Panel; Future  -     TSH; Future  -     Hemoglobin A1c; Future    2. Chronic hepatitis C without hepatic coma (CMS/HCC)  -     Ambulatory Referral to Gastroenterology    3. Mixed hyperlipidemia  -     Lipid Panel; Future    4. Essential hypertension  Comments:  repeat /100. Pt states she hasn't taken lisinopril in the last couple of days. Will restart it and monitor BP at home.     The importance of monitoring blood sugar regularly was reviewed.  The importance of monitoring the HgBA1C level regularly was reviewed.  The importance of monitoring urine microalbumin regularly to check for kidney damage was reviewed.   The importance of annual eye exams to prevent blindness was reviewed.  The importance of proper foot care and regularly checking feet to prevent sores and possibly loss of limbs was reviewed.   During this office visit, we discussed the pertinent aspects of the visit and treatment recommendations. Pt verbalizes understanding. Follow up was discussed. Patient was given the opportunity to ask questions and discuss other concerns.       Return in about 3 months (around 10/20/2020) for Diabetes follow up.

## 2020-07-23 ENCOUNTER — TELEPHONE (OUTPATIENT)
Dept: FAMILY MEDICINE CLINIC | Facility: CLINIC | Age: 60
End: 2020-07-23

## 2020-07-23 ENCOUNTER — TELEPHONE (OUTPATIENT)
Dept: NEUROSURGERY | Facility: CLINIC | Age: 60
End: 2020-07-23

## 2020-07-23 NOTE — TELEPHONE ENCOUNTER
PATIENT CALLED & STATED SHE SPOKE TO OFFICE EARLIER ABOUT SCHEDULING AN APPT FOR AFTER 8/10 UPON DR. ROYAL RETURN . PATIENT INQUIRING IN THE MEANTIME IF PRESCRIPTION FROM [facility]? CONTAINED ANY TYLENOL DUE TO AC1 LEVELS. NO REC OF A PREV CALL MADE IN CHART WITH INSTRUCTIONS AS TO WHAT & WHY PATIENT WAS CONTACTED PREV. BUT PATIENT REQUESTING MEDICAL ADVICE REGARDING CURRENT MEDS.     PATIENT CAN BE CONTACTED [phone] FOR FURTHER ASSISTANCE

## 2020-07-23 NOTE — PROGRESS NOTES
A1C is down to 7.8. Cont working on diet and eliminating sugar and counting carbs.  Her liver enzymes were elevated some. Needs to avoid tylenol and ETOH and we will repeat this in 3 months

## 2020-07-23 NOTE — TELEPHONE ENCOUNTER
----- Message from CIRO García sent at 7/23/2020  1:00 PM EDT -----  A1C is down to 7.8. Cont working on diet and eliminating sugar and counting carbs.  Her liver enzymes were elevated some. Needs to avoid tylenol and ETOH and we will repeat this in 3 months

## 2020-07-24 NOTE — TELEPHONE ENCOUNTER
Patient was given Norco but states that she has liver enzyme issues and wondered if it was ok to take

## 2020-07-24 NOTE — TELEPHONE ENCOUNTER
She can take it, just no more than 2000mg of tylenol per day (for people without liver issues, they can take up to 4000mg per day).

## 2020-07-24 NOTE — TELEPHONE ENCOUNTER
PT STATES THAT HER PRESCRIPTION FOR  HYDRO CODACE   HAS TYLENOL IN IT AND SHE WAS TOLD NOT TO TAKE ANY TYLENOL.  PT WANTS TO KNOW WHAT ALTERNATIVE SHE CAN TAKE INSTEAD.    PT CALL BACK   PHARMACY  [facility]  [facility] [address]  [phone]

## 2020-07-24 NOTE — TELEPHONE ENCOUNTER
PATIENT CALLED BACK TO INFORM TONE THAT THE MED SHE IS TAKING IS  HYDROCODONE/ACE 5-3.25.  PLEASE CALL PATIENT AND ADVISE.    [phone]

## 2020-07-28 ENCOUNTER — TELEPHONE (OUTPATIENT)
Dept: FAMILY MEDICINE CLINIC | Facility: CLINIC | Age: 60
End: 2020-07-28

## 2020-07-28 ENCOUNTER — NURSE TRIAGE (OUTPATIENT)
Dept: CALL CENTER | Facility: HOSPITAL | Age: 60
End: 2020-07-28

## 2020-07-28 DIAGNOSIS — E16.2 HYPOGLYCEMIA: ICD-10-CM

## 2020-07-28 DIAGNOSIS — E11.65 TYPE 2 DIABETES MELLITUS WITH HYPERGLYCEMIA, WITH LONG-TERM CURRENT USE OF INSULIN (HCC): Primary | ICD-10-CM

## 2020-07-28 DIAGNOSIS — Z79.4 TYPE 2 DIABETES MELLITUS WITH HYPERGLYCEMIA, WITH LONG-TERM CURRENT USE OF INSULIN (HCC): Primary | ICD-10-CM

## 2020-07-28 NOTE — TELEPHONE ENCOUNTER
"Transferred from the hub.  Patient distraught and difficult to calm down.  She states for the last several nights she has taken her 40 units of insulin (cannot specify type) and her  has woke up with her \"in a coma\" and her bs is in the 20\"s.  She states that EMS has been called several times and given her glucagon.  Patient finally calmed down and recommended not taking any more insulin until she speaks with her provider today.  Office called and spoke with , no appts available today but she will have the provider call the patient with instructions.  Patient notified that provider will be calling her today.    Reason for Disposition  • [1] Caller has URGENT medication or insulin pump question AND [2] triager unable to answer question    Additional Information  • Negative: Unconscious or difficult to awaken  • Negative: Seizure occurs  • Negative: Acting confused (e.g., disoriented, slurred speech)  • Negative: Very weak (e.g., can't stand)  • Negative: Sounds like a life-threatening emergency to the triager  • Negative: [1] Vomiting AND [2] signs of dehydration (e.g., very dry mouth, lightheaded, dark urine)  • Negative: [1] Low blood sugar symptoms persist > 30 minutes AND [2] using low blood sugar Care Advice  • Negative: [1] Low blood glucose (< 70 mg/dL  or 3.9 mmol/L) persists > 30 minutes AND [2] using low blood sugar Care Advice  • Negative: Patient sounds very sick or weak to the triager  • Negative: [1] Low blood sugar symptoms with no other adult present AND [2] hasn't tried Care Advice  • Negative: [1] Low blood glucose (< 70 mg/dL  or 3.9 mmol/L) with no other adult present AND [2] hasn't tried Care Advice  • Negative: Diabetes drug error or overdose (e.g., insulin error or extra dose)    Answer Assessment - Initial Assessment Questions  1. SYMPTOMS: \"What symptoms are you concerned about?\"      My sugar has been dropping very low at night and my  has had to call the ambulance " "several times.  2. ONSET:  \"When did the symptoms start?\"      Caller was distraught and not clear how long this has been happening  3. BLOOD GLUCOSE: \"What is your blood glucose level?\"       Meter not working  4. USUAL RANGE: \"What is your blood glucose level usually?\" (e.g., usual fasting morning value, usual evening value)      Not sure  5. TYPE 1 or 2:  \"Do you know what type of diabetes you have?\"  (e.g., Type 1, Type 2, Gestational; doesn't know)       Doesn't know  6. INSULIN: \"Do you take insulin?\" \"What type of insulin(s) do you use? What is the mode of delivery? (syringe, pen; injection or pump) \"When did you last give yourself an insulin dose?\" (i.e., time or hours/minutes ago) \"How much did you give?\" (i.e., how many units)      She has been giving herself 40 units at night didn't specify type  7. DIABETES PILLS: \"Do you take any pills for your diabetes?\"      She didn't say  8. OTHER SYMPTOMS: \"Do you have any symptoms?\" (e.g., fever, frequent urination, difficulty breathing, vomiting)      no  9. LOW BLOOD GLUCOSE TREATMENT: \"What have you done so far to treat the low blood glucose level?\"      Eat protien  10. FOOD: \"When did you last eat or drink?\"        now  11. ALONE: \"Are you alone right now or is someone with you?\"         no  12. PREGNANCY: \"Is there any chance you are pregnant?\" \"When was your last menstrual period?\"        no    Protocols used: DIABETES - LOW BLOOD SUGAR-ADULT-AH      "

## 2020-07-31 ENCOUNTER — TELEPHONE (OUTPATIENT)
Dept: ENDOCRINOLOGY | Facility: CLINIC | Age: 60
End: 2020-07-31

## 2020-07-31 ENCOUNTER — TELEPHONE (OUTPATIENT)
Dept: FAMILY MEDICINE CLINIC | Facility: CLINIC | Age: 60
End: 2020-07-31

## 2020-07-31 RX ORDER — BLOOD-GLUCOSE METER
EACH MISCELLANEOUS
Qty: 1 KIT | Refills: 0 | Status: SHIPPED | OUTPATIENT
Start: 2020-07-31 | End: 2021-09-01

## 2020-07-31 NOTE — TELEPHONE ENCOUNTER
[facility]ISH WITH [employer] CALLED IN STATED THEY HAVE SENT IN A ACCU CHECK TEST KIT FOR PATIENT AND HAVEN'T HEARD ANYTHING AND HAS SENT OVER A FAX FOR IT . HUB ATTEMPTED TO CONTACT PRACTICE NOT SUCCESSFUL . PLEASE SEND TO   SENT TO : [facility] ([facility])  [phone]         CALL BACK  TRISH [phone]

## 2020-08-01 NOTE — TELEPHONE ENCOUNTER
She needs to urgently see the Diabetes educator. Bring all her diabetes meds & supplies.  Put her on the cancellation list & we will see her as soon as feasible.

## 2020-08-10 ENCOUNTER — TELEPHONE (OUTPATIENT)
Dept: NEUROSURGERY | Facility: CLINIC | Age: 60
End: 2020-08-10

## 2020-08-10 NOTE — TELEPHONE ENCOUNTER
PATIENT CALLED REGARDING FOLLOW UP THAT WAS CANCELLED BY OUR NA OFFICE. PATIENT READY TO BE SCHEDULED FOR TELEVIST INDICATED IN REASON. UNABLE TO COMPLETE TASK DUE TO IT BEING A TELEVISIT. PATIENT INQUIRING WHEN SHE CAN RETURN TO WORK. PATIENT STATES THAT SOMEONE WERE GOING TO CHECK INTO REC FROM [facility] FROM [facility]. NO NOTE IN CHART INDICATING THIS AT TIME OF CALL. IMAGING REPORTS CURRENTLY IN CHART.  PATIENT ALSO REQUESTING MED FOR NECK SPASMS & REQUESTING MEDICAL ADVICE REGARDING PAIN WHILE WAITING.    [phone]

## 2020-08-12 ENCOUNTER — TELEPHONE (OUTPATIENT)
Dept: FAMILY MEDICINE CLINIC | Facility: CLINIC | Age: 60
End: 2020-08-12

## 2020-08-12 RX ORDER — GLUCOSAMINE HCL/CHONDROITIN SU 500-400 MG
CAPSULE ORAL
Qty: 300 EACH | Refills: 2 | Status: SHIPPED | OUTPATIENT
Start: 2020-08-12 | End: 2021-09-01

## 2020-08-12 RX ORDER — CYCLOBENZAPRINE HCL 10 MG
10 TABLET ORAL NIGHTLY
Qty: 30 TABLET | Refills: 0 | Status: SHIPPED | OUTPATIENT
Start: 2020-08-12 | End: 2020-10-19

## 2020-08-12 NOTE — TELEPHONE ENCOUNTER
PATIENT WAS CALLING TO SEE WHAT TO DO SHE KEEPS HAVING APPOINTMENTS CANCELLED AND RESCHEDULED WITH THE NEUROSURGEON. THEY WILL NOT TELL HER WHY THEY KEEP CANCELLING HER APPOINTMENTS.     THEIR OFFICE REFERRED HER BACK TO HER PCP TO ASSIST HER REGARDING WHETHER SHE NEEDS TO RETURN TO WORK OR IF SHE NEEDS TO CONTINUE TO WEAR THE BRACE.     PATIENT WANTS TO BE SAFE WHEN SHE RETURNS TO WORK BUT IS NOT GETTING THE ANSWERS SHE NEEDS PLEASE ADVISE.       PATIENT IS GETTING VERY FRUSTRATED ABOUT GETTING THE RUN AROUND AND JUST WANTS TO KNOW WHAT TO DO.     PATIENT STATED SHE IS HAVING TROUBLE DROPPING STUFF AND HAVING DIZZINESS.       PATIENT CAN BE REACHED [phone] AND WOULD LIKE A CALL BACK.

## 2020-08-12 NOTE — TELEPHONE ENCOUNTER
Patient was never instructed to be off of work per dr. Kaba notes. We will schedule her for f/u appointment.   I will send in brief course of muscle relaxer to be taken at bedtime.

## 2020-08-12 NOTE — TELEPHONE ENCOUNTER
Yeah, i'm not sure what has happened since the time I saw her for a one time follow up  Sounds like she needs an appt

## 2020-08-12 NOTE — TELEPHONE ENCOUNTER
I have never seen the pt for this issue, and I'm not familiar with what is going on.  Dr. Lundy, it looks like you saw her months ago about it.  According to Dr. Kaba note she was never told to be off work.

## 2020-08-12 NOTE — TELEPHONE ENCOUNTER
[facility] NEEDS A PRESCRIPTION BD SINGLE USE ALCOHOL SWABS.  PLEASE FAX TO: [phone]    [facility]: [phone]

## 2020-08-12 NOTE — TELEPHONE ENCOUNTER
I spoke with patient. I advised her to speak with her PCP to get off work statement until she is scheduled a follow-up with Dr. Kaba. Sr. Kaba is in surgery the rest of the week so he is unavailable to see patient. She voiced understanding and stated that she will call her PCP now.

## 2020-08-12 NOTE — TELEPHONE ENCOUNTER
Gave message to patient at 4:43pm and scheduled an appt with [facility] on 08/17/2020 at 11:15am - 30 minutes.

## 2020-08-12 NOTE — TELEPHONE ENCOUNTER
Patient is wanting to return to work tomorrow. She was last seen on 06/08/20.    She is requesting medication for muscle spasms.    Is she okay to return to work or no?    What are her limitations if she is okay return

## 2020-08-15 ENCOUNTER — HOSPITAL ENCOUNTER (EMERGENCY)
Facility: HOSPITAL | Age: 60
Discharge: HOME | End: 2020-08-15
Attending: EMERGENCY MEDICINE | Admitting: EMERGENCY MEDICINE

## 2020-08-15 ENCOUNTER — APPOINTMENT (OUTPATIENT)
Dept: CT IMAGING | Facility: HOSPITAL | Age: 60
End: 2020-08-15

## 2020-08-15 VITALS
SYSTOLIC BLOOD PRESSURE: 135 MMHG | HEIGHT: 62 IN | HEART RATE: 76 BPM | BODY MASS INDEX: 33.13 KG/M2 | DIASTOLIC BLOOD PRESSURE: 86 MMHG | TEMPERATURE: 98.5 F | OXYGEN SATURATION: 98 % | RESPIRATION RATE: 16 BRPM | WEIGHT: 180 LBS

## 2020-08-15 DIAGNOSIS — R41.82 ALTERED MENTAL STATUS, UNSPECIFIED ALTERED MENTAL STATUS TYPE: Primary | ICD-10-CM

## 2020-08-15 DIAGNOSIS — E16.2 HYPOGLYCEMIA: ICD-10-CM

## 2020-08-15 LAB
ALBUMIN SERPL-MCNC: 4.2 G/DL (ref 3.5–5.2)
ALBUMIN/GLOB SERPL: 1.3 G/DL
ALP SERPL-CCNC: 104 U/L (ref 39–117)
ALT SERPL W P-5'-P-CCNC: 101 U/L (ref 1–33)
AMPHET+METHAMPHET UR QL: NEGATIVE
ANION GAP SERPL CALCULATED.3IONS-SCNC: 13 MMOL/L (ref 5–15)
APTT PPP: 29.5 SECONDS (ref 24–31)
AST SERPL-CCNC: 89 U/L (ref 1–32)
BARBITURATES UR QL SCN: NEGATIVE
BASOPHILS # BLD AUTO: 0.1 10*3/MM3 (ref 0–0.2)
BASOPHILS NFR BLD AUTO: 0.6 % (ref 0–1.5)
BENZODIAZ UR QL SCN: NEGATIVE
BILIRUB SERPL-MCNC: 0.3 MG/DL (ref 0–1.2)
BUN SERPL-MCNC: 23 MG/DL (ref 6–20)
BUN SERPL-MCNC: ABNORMAL MG/DL
BUN/CREAT SERPL: ABNORMAL
CALCIUM SPEC-SCNC: 9.3 MG/DL (ref 8.6–10.5)
CANNABINOIDS SERPL QL: NEGATIVE
CHLORIDE SERPL-SCNC: 105 MMOL/L (ref 98–107)
CO2 SERPL-SCNC: 23 MMOL/L (ref 22–29)
COCAINE UR QL: NEGATIVE
CREAT SERPL-MCNC: 0.85 MG/DL (ref 0.57–1)
DEPRECATED RDW RBC AUTO: 39.8 FL (ref 37–54)
EOSINOPHIL # BLD AUTO: 0.1 10*3/MM3 (ref 0–0.4)
EOSINOPHIL NFR BLD AUTO: 0.7 % (ref 0.3–6.2)
ERYTHROCYTE [DISTWIDTH] IN BLOOD BY AUTOMATED COUNT: 13.2 % (ref 12.3–15.4)
ETHANOL UR QL: <0.01 %
GFR SERPL CREATININE-BSD FRML MDRD: 69 ML/MIN/1.73
GLOBULIN UR ELPH-MCNC: 3.2 GM/DL
GLUCOSE BLDC GLUCOMTR-MCNC: 104 MG/DL (ref 70–105)
GLUCOSE BLDC GLUCOMTR-MCNC: 105 MG/DL (ref 70–105)
GLUCOSE BLDC GLUCOMTR-MCNC: 41 MG/DL (ref 70–105)
GLUCOSE BLDC GLUCOMTR-MCNC: 47 MG/DL (ref 70–105)
GLUCOSE SERPL-MCNC: 142 MG/DL (ref 65–99)
HCT VFR BLD AUTO: 45.3 % (ref 34–46.6)
HGB BLD-MCNC: 15.1 G/DL (ref 12–15.9)
HOLD SPECIMEN: NORMAL
INR PPP: 1.01 (ref 0.93–1.1)
LYMPHOCYTES # BLD AUTO: 1.9 10*3/MM3 (ref 0.7–3.1)
LYMPHOCYTES NFR BLD AUTO: 16.1 % (ref 19.6–45.3)
MCH RBC QN AUTO: 28.6 PG (ref 26.6–33)
MCHC RBC AUTO-ENTMCNC: 33.3 G/DL (ref 31.5–35.7)
MCV RBC AUTO: 85.9 FL (ref 79–97)
METHADONE UR QL SCN: NEGATIVE
MONOCYTES # BLD AUTO: 0.8 10*3/MM3 (ref 0.1–0.9)
MONOCYTES NFR BLD AUTO: 6.5 % (ref 5–12)
NEUTROPHILS NFR BLD AUTO: 76.1 % (ref 42.7–76)
NEUTROPHILS NFR BLD AUTO: 9 10*3/MM3 (ref 1.7–7)
NRBC BLD AUTO-RTO: 0.1 /100 WBC (ref 0–0.2)
OPIATES UR QL: NEGATIVE
OXYCODONE UR QL SCN: NEGATIVE
PLATELET # BLD AUTO: 153 10*3/MM3 (ref 140–450)
PMV BLD AUTO: 9.3 FL (ref 6–12)
POTASSIUM SERPL-SCNC: 3.6 MMOL/L (ref 3.5–5.2)
PROT SERPL-MCNC: 7.4 G/DL (ref 6–8.5)
PROTHROMBIN TIME: 11 SECONDS (ref 9.6–11.7)
RBC # BLD AUTO: 5.27 10*6/MM3 (ref 3.77–5.28)
SODIUM SERPL-SCNC: 141 MMOL/L (ref 136–145)
TROPONIN T SERPL-MCNC: <0.01 NG/ML (ref 0–0.03)
WBC # BLD AUTO: 11.8 10*3/MM3 (ref 3.4–10.8)

## 2020-08-15 PROCEDURE — 85730 THROMBOPLASTIN TIME PARTIAL: CPT | Performed by: EMERGENCY MEDICINE

## 2020-08-15 PROCEDURE — 80307 DRUG TEST PRSMV CHEM ANLYZR: CPT | Performed by: EMERGENCY MEDICINE

## 2020-08-15 PROCEDURE — 70450 CT HEAD/BRAIN W/O DYE: CPT

## 2020-08-15 PROCEDURE — 85025 COMPLETE CBC W/AUTO DIFF WBC: CPT | Performed by: EMERGENCY MEDICINE

## 2020-08-15 PROCEDURE — 96374 THER/PROPH/DIAG INJ IV PUSH: CPT

## 2020-08-15 PROCEDURE — 82962 GLUCOSE BLOOD TEST: CPT

## 2020-08-15 PROCEDURE — 99284 EMERGENCY DEPT VISIT MOD MDM: CPT

## 2020-08-15 PROCEDURE — 84520 ASSAY OF UREA NITROGEN: CPT | Performed by: EMERGENCY MEDICINE

## 2020-08-15 PROCEDURE — 84484 ASSAY OF TROPONIN QUANT: CPT | Performed by: EMERGENCY MEDICINE

## 2020-08-15 PROCEDURE — 85610 PROTHROMBIN TIME: CPT | Performed by: EMERGENCY MEDICINE

## 2020-08-15 PROCEDURE — 80053 COMPREHEN METABOLIC PANEL: CPT | Performed by: EMERGENCY MEDICINE

## 2020-08-15 PROCEDURE — 93005 ELECTROCARDIOGRAM TRACING: CPT | Performed by: EMERGENCY MEDICINE

## 2020-08-15 RX ORDER — DEXTROSE MONOHYDRATE 25 G/50ML
25 INJECTION, SOLUTION INTRAVENOUS ONCE
Status: COMPLETED | OUTPATIENT
Start: 2020-08-15 | End: 2020-08-15

## 2020-08-15 RX ORDER — SODIUM CHLORIDE 0.9 % (FLUSH) 0.9 %
10 SYRINGE (ML) INJECTION AS NEEDED
Status: DISCONTINUED | OUTPATIENT
Start: 2020-08-15 | End: 2020-08-15 | Stop reason: HOSPADM

## 2020-08-15 RX ADMIN — DEXTROSE MONOHYDRATE 25 G: 25 INJECTION, SOLUTION INTRAVENOUS at 13:52

## 2020-08-15 NOTE — DISCHARGE INSTRUCTIONS
Follow-up with your primary doctor and endocrinologist.  Return to the emergency room for any new or worsening symptoms or if you have any other questions or concerns.

## 2020-08-15 NOTE — ED PROVIDER NOTES
Subjective   Chief complaint: Altered mental status    59-year-old female presents with altered mental status.  Patient apparently went to work and was normal at [employer] today.  While at work she started displaying some bizarre behavior.  Patient is a diabetic and some coworkers tried to give her some sugary snacks but patient refused.  Patient just keeps hollering out and cannot provide any additional history.      History provided by:  EMS personnel      Review of Systems   Unable to perform ROS: Mental status change       Past Medical History:   Diagnosis Date   • Breast cancer (CMS/[facility]) 2017    mets to lymph nodes; did not do radiation   • Cancer of unknown origin (CMS/HCC)    • Compression fx, thoracic spine, open, initial encounter (CMS/[facility])    • Diabetes mellitus (CMS/HCC)    • Heart disease, unspecified    • Hepatitis C    • Hypertension    • Sleep apnea        Allergies   Allergen Reactions   • Promethazine Mental Status Change       Past Surgical History:   Procedure Laterality Date   • BREAST RECONSTRUCTION Bilateral    • CARDIAC ABLATION       atrial tachycardia x 5 ablations    • CARDIAC SURGERY      stent placed in aorta   • CARDIAC SURGERY      6 surgeries as baby    •  SECTION      x2   • KNEE SURGERY     • MASTECTOMY Bilateral    • NECK SURGERY     • PACEMAKER IMPLANTATION         Family History   Problem Relation Age of Onset   • Lymphoma Mother    • Heart disease Mother    • Other Father    • No Known Problems Sister    • No Known Problems Brother    • No Known Problems Brother    • Diabetes type I Half-Sister    • Thyroid cancer Half-Sister        Social History     Socioeconomic History   • Marital status:      Spouse name: Not on file   • Number of children: 2   • Years of education: Not on file   • Highest education level: Not on file   Occupational History     Employer: [employer]   Tobacco Use   • Smoking status: Never Smoker   • Smokeless tobacco: Never Used  "  Substance and Sexual Activity   • Alcohol use: Yes     Frequency: Monthly or less     Comment: occasionally   • Drug use: Not Currently   • Sexual activity: Defer   Social History Narrative        So in: Lives with brother in law and     Lives 1/2 time in [place]       BP 96/78   Pulse 81   Temp 98.5 °F (36.9 °C) (Oral)   Resp 17   Ht 157.5 cm (62\")   Wt 81.6 kg (180 lb)   SpO2 97%   BMI 32.92 kg/m²       Objective   Physical Exam   Constitutional: She appears well-developed and well-nourished.   HENT:   Head: Normocephalic.   Eyes: Pupils are equal, round, and reactive to light. EOM are normal.   Neck: Normal range of motion. Neck supple.   Cardiovascular: Normal rate, regular rhythm and normal heart sounds.   Pulmonary/Chest: Effort normal and breath sounds normal.   Abdominal: Soft. Bowel sounds are normal. There is no tenderness.   Musculoskeletal: Normal range of motion.   Neurological: She is alert.   Moves all extremities with good strength throughout.  Confused in conversation.  Hollering out randomly.  Bizarre behavior.   Skin: Skin is warm and dry.   Nursing note and vitals reviewed.      Procedures           ED Course      Results for orders placed or performed during the hospital encounter of 08/15/20   Comprehensive Metabolic Panel   Result Value Ref Range    Glucose 142 (H) 65 - 99 mg/dL    BUN      Creatinine 0.85 0.57 - 1.00 mg/dL    Sodium 141 136 - 145 mmol/L    Potassium 3.6 3.5 - 5.2 mmol/L    Chloride 105 98 - 107 mmol/L    CO2 23.0 22.0 - 29.0 mmol/L    Calcium 9.3 8.6 - 10.5 mg/dL    Total Protein 7.4 6.0 - 8.5 g/dL    Albumin 4.20 3.50 - 5.20 g/dL    ALT (SGPT) 101 (H) 1 - 33 U/L    AST (SGOT) 89 (H) 1 - 32 U/L    Alkaline Phosphatase 104 39 - 117 U/L    Total Bilirubin 0.3 0.0 - 1.2 mg/dL    eGFR Non African Amer 69 >60 mL/min/1.73    Globulin 3.2 gm/dL    A/G Ratio 1.3 g/dL    BUN/Creatinine Ratio      Anion Gap 13.0 5.0 - 15.0 mmol/L   Troponin   Result Value Ref " Range    Troponin T <0.010 0.000 - 0.030 ng/mL   Protime-INR   Result Value Ref Range    Protime 11.0 9.6 - 11.7 Seconds    INR 1.01 0.93 - 1.10   aPTT   Result Value Ref Range    PTT 29.5 24.0 - 31.0 seconds   Ethanol   Result Value Ref Range    Ethanol % <0.010 %   Urine Drug Screen - Urine, Clean Catch   Result Value Ref Range    Amphet/Methamphet, Screen Negative Negative    Barbiturates Screen, Urine Negative Negative    Benzodiazepine Screen, Urine Negative Negative    Cocaine Screen, Urine Negative Negative    Opiate Screen Negative Negative    THC, Screen, Urine Negative Negative    Methadone Screen, Urine Negative Negative    Oxycodone Screen, Urine Negative Negative   CBC Auto Differential   Result Value Ref Range    WBC 11.80 (H) 3.40 - 10.80 10*3/mm3    RBC 5.27 3.77 - 5.28 10*6/mm3    Hemoglobin 15.1 12.0 - 15.9 g/dL    Hematocrit 45.3 34.0 - 46.6 %    MCV 85.9 79.0 - 97.0 fL    MCH 28.6 26.6 - 33.0 pg    MCHC 33.3 31.5 - 35.7 g/dL    RDW 13.2 12.3 - 15.4 %    RDW-SD 39.8 37.0 - 54.0 fl    MPV 9.3 6.0 - 12.0 fL    Platelets 153 140 - 450 10*3/mm3    Neutrophil % 76.1 (H) 42.7 - 76.0 %    Lymphocyte % 16.1 (L) 19.6 - 45.3 %    Monocyte % 6.5 5.0 - 12.0 %    Eosinophil % 0.7 0.3 - 6.2 %    Basophil % 0.6 0.0 - 1.5 %    Neutrophils, Absolute 9.00 (H) 1.70 - 7.00 10*3/mm3    Lymphocytes, Absolute 1.90 0.70 - 3.10 10*3/mm3    Monocytes, Absolute 0.80 0.10 - 0.90 10*3/mm3    Eosinophils, Absolute 0.10 0.00 - 0.40 10*3/mm3    Basophils, Absolute 0.10 0.00 - 0.20 10*3/mm3    nRBC 0.1 0.0 - 0.2 /100 WBC   BUN   Result Value Ref Range    BUN 23 (H) 6 - 20 mg/dL   POC Glucose Once   Result Value Ref Range    Glucose 47 (L) 70 - 105 mg/dL   POC Glucose Once   Result Value Ref Range    Glucose 104 70 - 105 mg/dL   POC Glucose Once   Result Value Ref Range    Glucose 41 (L) 70 - 105 mg/dL   POC Glucose Once   Result Value Ref Range    Glucose 105 70 - 105 mg/dL   Gold Top - SST   Result Value Ref Range    Extra Tube  Hold for add-ons.      Ct Head Without Contrast    Result Date: 8/15/2020  No CT findings of acute intracranial abnormality.  Electronically Signed By-DR. Stanley Miller MD On:8/15/2020 3:46 PM This report was finalized on [id number] by DR. Stanley Miller MD.         My interpretation of EKG shows atrial sensed ventricular paced rhythm, rate of 87, unchanged from previous                                MDM   Patient had the above evaluation.  Results were discussed with the patient.  Patient was given an amp of D50 and her mental status quickly improved.  She is now alert and oriented.  She states this is happened a few times recently with low blood sugar.  She has an appointment on Wednesday with her endocrinologist.  She has been observed in the emergency room for 4 hours and she has been able to maintain her blood sugar with p.o. intake.  Work-up has been otherwise unremarkable.  She will be discharged to follow-up with her primary doctor and endocrinologist as scheduled.      Final diagnoses:   Altered mental status, unspecified altered mental status type   Hypoglycemia            Ki Palacios MD  08/15/20 6901

## 2020-08-15 NOTE — ED NOTES
"Patient is more oriented after D50W, patient reports \"I always act funny when my blood sugar gets low\"     An Hough RN  08/15/20 6237    "

## 2020-08-17 ENCOUNTER — OFFICE VISIT (OUTPATIENT)
Dept: FAMILY MEDICINE CLINIC | Facility: CLINIC | Age: 60
End: 2020-08-17

## 2020-08-17 ENCOUNTER — TELEPHONE (OUTPATIENT)
Dept: FAMILY MEDICINE CLINIC | Facility: CLINIC | Age: 60
End: 2020-08-17

## 2020-08-17 VITALS
BODY MASS INDEX: 31.47 KG/M2 | TEMPERATURE: 97.5 F | SYSTOLIC BLOOD PRESSURE: 150 MMHG | RESPIRATION RATE: 16 BRPM | WEIGHT: 171 LBS | HEIGHT: 62 IN | OXYGEN SATURATION: 95 % | DIASTOLIC BLOOD PRESSURE: 79 MMHG | HEART RATE: 88 BPM

## 2020-08-17 DIAGNOSIS — Z79.4 TYPE 2 DIABETES MELLITUS WITH HYPOGLYCEMIA WITHOUT COMA, WITH LONG-TERM CURRENT USE OF INSULIN (HCC): Primary | ICD-10-CM

## 2020-08-17 DIAGNOSIS — E11.649 TYPE 2 DIABETES MELLITUS WITH HYPOGLYCEMIA WITHOUT COMA, WITH LONG-TERM CURRENT USE OF INSULIN (HCC): Primary | ICD-10-CM

## 2020-08-17 DIAGNOSIS — S22.080K COMPRESSION FRACTURE OF T12 VERTEBRA WITH NONUNION, SUBSEQUENT ENCOUNTER: ICD-10-CM

## 2020-08-17 DIAGNOSIS — S22.078K: ICD-10-CM

## 2020-08-17 PROCEDURE — 99214 OFFICE O/P EST MOD 30 MIN: CPT | Performed by: NURSE PRACTITIONER
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
## 2020-08-19 ENCOUNTER — OFFICE VISIT (OUTPATIENT)
Dept: ENDOCRINOLOGY | Facility: CLINIC | Age: 60
End: 2020-08-19

## 2020-08-19 VITALS
HEART RATE: 77 BPM | HEIGHT: 61 IN | BODY MASS INDEX: 32.28 KG/M2 | WEIGHT: 171 LBS | SYSTOLIC BLOOD PRESSURE: 136 MMHG | TEMPERATURE: 97.6 F | OXYGEN SATURATION: 97 % | DIASTOLIC BLOOD PRESSURE: 82 MMHG

## 2020-08-19 DIAGNOSIS — E78.5 HYPERLIPIDEMIA, UNSPECIFIED HYPERLIPIDEMIA TYPE: ICD-10-CM

## 2020-08-19 DIAGNOSIS — E55.9 VITAMIN D DEFICIENCY: ICD-10-CM

## 2020-08-19 DIAGNOSIS — E11.9 CONTROLLED TYPE 2 DIABETES MELLITUS WITHOUT COMPLICATION, WITH LONG-TERM CURRENT USE OF INSULIN (HCC): Primary | ICD-10-CM

## 2020-08-19 DIAGNOSIS — Z79.4 CONTROLLED TYPE 2 DIABETES MELLITUS WITHOUT COMPLICATION, WITH LONG-TERM CURRENT USE OF INSULIN (HCC): Primary | ICD-10-CM

## 2020-08-19 LAB
GLUCOSE BLDC GLUCOMTR-MCNC: 150 MG/DL (ref 70–105)
GLUCOSE BLDC GLUCOMTR-MCNC: 150 MG/DL (ref 70–130)

## 2020-08-19 PROCEDURE — 99204 OFFICE O/P NEW MOD 45 MIN: CPT | Performed by: INTERNAL MEDICINE

## 2020-08-19 PROCEDURE — 82962 GLUCOSE BLOOD TEST: CPT | Performed by: INTERNAL MEDICINE

## 2020-08-19 RX ORDER — LANCETS
EACH MISCELLANEOUS
Qty: 100 EACH | Refills: 12 | Status: SHIPPED | OUTPATIENT
Start: 2020-08-19 | End: 2021-09-01

## 2020-08-19 RX ORDER — ROSUVASTATIN CALCIUM 10 MG/1
TABLET, COATED ORAL
Start: 2020-08-19 | End: 2021-03-09

## 2020-08-19 NOTE — PATIENT INSTRUCTIONS
Schedule refresher class.  See eye doctor.  Continue exercise.  Decrease salt intake.  Check blood sugar before meals & bedtime at least 2 d / week. Record.  Call if blood sugars are running under 100 or over 200.  Continue meds.  Take insulin 15-30 min before breakfast & supper.  F/u in 6 months, with fasting labs prior.

## 2020-08-21 ENCOUNTER — OFFICE (OUTPATIENT)
Dept: URBAN - METROPOLITAN AREA CLINIC 64 | Facility: CLINIC | Age: 60
End: 2020-08-21
Payer: MEDICARE

## 2020-08-21 VITALS
DIASTOLIC BLOOD PRESSURE: 64 MMHG | SYSTOLIC BLOOD PRESSURE: 93 MMHG | SYSTOLIC BLOOD PRESSURE: 93 MMHG | HEIGHT: 61 IN | WEIGHT: 169 LBS | HEIGHT: 61 IN | HEIGHT: 61 IN | DIASTOLIC BLOOD PRESSURE: 64 MMHG | SYSTOLIC BLOOD PRESSURE: 93 MMHG | WEIGHT: 169 LBS | WEIGHT: 169 LBS | HEART RATE: 53 BPM | DIASTOLIC BLOOD PRESSURE: 64 MMHG | HEART RATE: 53 BPM | HEART RATE: 53 BPM

## 2020-08-21 DIAGNOSIS — K76.6 PORTAL HYPERTENSION: ICD-10-CM

## 2020-08-21 DIAGNOSIS — Q24.9 CONGENITAL MALFORMATION OF HEART, UNSPECIFIED: ICD-10-CM

## 2020-08-21 DIAGNOSIS — E11.9 TYPE 2 DIABETES MELLITUS WITHOUT COMPLICATIONS: ICD-10-CM

## 2020-08-21 DIAGNOSIS — Z12.11 ENCOUNTER FOR SCREENING FOR MALIGNANT NEOPLASM OF COLON: ICD-10-CM

## 2020-08-21 DIAGNOSIS — E66.9 OBESITY, UNSPECIFIED: ICD-10-CM

## 2020-08-21 DIAGNOSIS — B18.2 CHRONIC VIRAL HEPATITIS C: ICD-10-CM

## 2020-08-21 PROCEDURE — 99243 OFF/OP CNSLTJ NEW/EST LOW 30: CPT | Performed by: INTERNAL MEDICINE

## 2020-08-21 PROCEDURE — 99203 OFFICE O/P NEW LOW 30 MIN: CPT | Performed by: INTERNAL MEDICINE

## 2020-08-29 NOTE — PROGRESS NOTES
[facility]    Referring Provider: Kourtney Coffman APRN  Reason for Consultation: Diabetes evaluation & management.    Patient Care Team:  Kourtney Coffman APRN as PCP - General (Nurse Practitioner)    Chief complaint Diabetes (type 2)      Subjective .     History of present illness:    This is a  59 y.o. female with type 2 Diabetes diagnosed in 2017 with flu like symptoms, vomiting x 3d.  Started on insulin. Currently on Novolin 70/30 40 units in am & 30 units in pm.  Has an Accuchek Araceli meter, but no strips. So, not checking blood sugars.  Attended diabetes education in the past.  Last eye exam 2y ago.  Walking 10,000 steps daily. Uses FitBit.  Taking calcium + D daily.    Review of Systems  Review of Systems   Constitutional: Positive for unexpected weight gain.   HENT: Negative for trouble swallowing.    Eyes: Negative for blurred vision.   Gastrointestinal: Negative for nausea.   Endocrine: Positive for polydipsia. Negative for polyuria.   Genitourinary:        Nocturia   Neurological: Positive for dizziness. Negative for headache.       History  Past Medical History:   Diagnosis Date   • Breast cancer (CMS/HCC) 2017    mets to lymph nodes; did not do radiation   • Cancer of unknown origin (CMS/HCC)    • Compression fx, thoracic spine, open, initial encounter (CMS/HCC)    • Diabetes mellitus (CMS/HCC)    • Heart disease, unspecified    • Hepatitis C    • Hypertension    • Sleep apnea    • Type 2 diabetes mellitus (CMS/HCC)      Past Surgical History:   Procedure Laterality Date   • BREAST RECONSTRUCTION Bilateral    • CARDIAC ABLATION       atrial tachycardia x 5 ablations    • CARDIAC SURGERY      stent placed in aorta   • CARDIAC SURGERY      6 surgeries as baby    •  SECTION      x2   • KNEE SURGERY     • MASTECTOMY Bilateral    • NECK SURGERY     • PACEMAKER IMPLANTATION       Family History   Problem Relation Age of Onset   • Lymphoma Mother    • Heart disease Mother     • Stroke Mother    • Cancer Mother    • Other Father    • Diabetes Sister    • Thyroid disease Sister    • No Known Problems Brother    • No Known Problems Brother    • Diabetes type I Half-Sister    • Thyroid cancer Half-Sister    • Cancer Maternal Aunt      Social History     Tobacco Use   • Smoking status: Never Smoker   • Smokeless tobacco: Never Used   Substance Use Topics   • Alcohol use: Yes     Frequency: Monthly or less     Comment: rare   • Drug use: Not Currently     PRN Meds:    Allergies:  Promethazine and Tape    Objective     Vital Signs       Vitals:    08/19/20 0859   BP: 136/82   Pulse: 77   Temp: 97.6 °F (36.4 °C)   SpO2: 97%         Physical Exam:     General Appearance:    Alert, cooperative, in no acute distress. Obese   Head:    Normocephalic, without obvious abnormality, atraumatic   Eyes:            Lids and lashes normal, conjunctivae and sclerae normal, no   icterus, periorbital edema   Throat:   No oral lesions,  oral mucosa moist   Neck:   No adenopathy, supple,  no thyromegaly, no   carotid bruit   Lungs:     Clear    Heart:    Regular rhythm and normal rate   Chest Wall:    S/p shaggy mastectomy   Abdomen:     Normal bowel sounds, soft                 Extremities:   Moves all extremities well, no edema               Pulses:   Pulses palpable and equal bilaterally   Skin:   Dry   Neurologic:  DTR absent in ankles, vibratory sense 25% decreased in toes, able to feel the 10g monofilament       Results Review  I have reviewed the patient's new clinical results, labs & imaging.    Lab Results (last 24 hours)     ** No results found for the last 24 hours. **        Lab Results   Component Value Date    HGBA1C 7.8 (H) 07/20/2020     Lab Results   Component Value Date    TSH 2.370 07/20/2020         Assessment/Plan     1. Diabetes type 2, with hyperglycemia  2. Hyperlipidemia, will check status next visit   3. Vitamin D Deficiency, will check status next visit    Schedule refresher class.  See  eye doctor.  Continue exercise.  Decrease salt intake.  Check blood sugar before meals & bedtime at least 2 d / week. Record.  Call if blood sugars are running under 100 or over 200.  Continue chol med & vit D supplements.  Take insulin 15-30 min before breakfast & supper.  Info on vit D given to pt.    I discussed the patients findings and my recommendations with patient    Lopez Rey MD  08/28/20  20:40

## 2020-09-01 ENCOUNTER — TELEPHONE (OUTPATIENT)
Dept: FAMILY MEDICINE CLINIC | Facility: CLINIC | Age: 60
End: 2020-09-01

## 2020-09-14 ENCOUNTER — PREP FOR SURGERY (OUTPATIENT)
Dept: OTHER | Facility: HOSPITAL | Age: 60
End: 2020-09-14

## 2020-09-14 ENCOUNTER — OFFICE VISIT (OUTPATIENT)
Dept: NEUROSURGERY | Facility: CLINIC | Age: 60
End: 2020-09-14

## 2020-09-14 VITALS
HEART RATE: 93 BPM | RESPIRATION RATE: 18 BRPM | DIASTOLIC BLOOD PRESSURE: 93 MMHG | SYSTOLIC BLOOD PRESSURE: 156 MMHG | HEIGHT: 61 IN | WEIGHT: 167 LBS | BODY MASS INDEX: 31.53 KG/M2

## 2020-09-14 DIAGNOSIS — E11.8 TYPE 2 DIABETES MELLITUS WITH UNSPECIFIED COMPLICATIONS (HCC): ICD-10-CM

## 2020-09-14 DIAGNOSIS — G95.9 CERVICAL MYELOPATHY (HCC): Primary | ICD-10-CM

## 2020-09-14 DIAGNOSIS — M43.12 ACQUIRED SPONDYLOLISTHESIS OF CERVICAL VERTEBRA: ICD-10-CM

## 2020-09-14 DIAGNOSIS — IMO0002 ADJACENT SEGMENT DISEASE WITH SPINAL STENOSIS: ICD-10-CM

## 2020-09-14 DIAGNOSIS — Z51.81 ENCOUNTER FOR THERAPEUTIC DRUG LEVEL MONITORING: ICD-10-CM

## 2020-09-14 DIAGNOSIS — M47.12 CERVICAL SPONDYLOSIS WITH MYELOPATHY: Primary | ICD-10-CM

## 2020-09-14 PROCEDURE — 99213 OFFICE O/P EST LOW 20 MIN: CPT | Performed by: NEUROLOGICAL SURGERY

## 2020-09-14 RX ORDER — CEFAZOLIN SODIUM IN 0.9 % NACL 3 G/100 ML
3 INTRAVENOUS SOLUTION, PIGGYBACK (ML) INTRAVENOUS ONCE
Status: CANCELLED | OUTPATIENT
Start: 2020-09-14 | End: 2020-09-14

## 2020-09-14 RX ORDER — SODIUM CHLORIDE 0.9 % (FLUSH) 0.9 %
3 SYRINGE (ML) INJECTION EVERY 12 HOURS SCHEDULED
Status: CANCELLED | OUTPATIENT
Start: 2020-09-14

## 2020-09-14 RX ORDER — SODIUM CHLORIDE 0.9 % (FLUSH) 0.9 %
3-10 SYRINGE (ML) INJECTION AS NEEDED
Status: CANCELLED | OUTPATIENT
Start: 2020-09-14

## 2020-09-14 RX ORDER — HYDROCODONE BITARTRATE AND ACETAMINOPHEN 5; 325 MG/1; MG/1
1 TABLET ORAL EVERY 4 HOURS PRN
COMMUNITY
End: 2020-09-23 | Stop reason: HOSPADM
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
## 2020-09-18 PROBLEM — G95.9 CERVICAL MYELOPATHY: Status: ACTIVE | Noted: 2020-09-18

## 2020-09-19 ENCOUNTER — LAB (OUTPATIENT)
Dept: LAB | Facility: HOSPITAL | Age: 60
End: 2020-09-19

## 2020-09-19 ENCOUNTER — HOSPITAL ENCOUNTER (OUTPATIENT)
Dept: CARDIOLOGY | Facility: HOSPITAL | Age: 60
Discharge: HOME | End: 2020-09-19

## 2020-09-19 ENCOUNTER — HOSPITAL ENCOUNTER (OUTPATIENT)
Dept: GENERAL RADIOLOGY | Facility: HOSPITAL | Age: 60
Discharge: HOME | End: 2020-09-19

## 2020-09-19 DIAGNOSIS — G95.9 CERVICAL MYELOPATHY (HCC): ICD-10-CM

## 2020-09-19 DIAGNOSIS — Z51.81 ENCOUNTER FOR THERAPEUTIC DRUG LEVEL MONITORING: ICD-10-CM

## 2020-09-19 DIAGNOSIS — E11.8 TYPE 2 DIABETES MELLITUS WITH UNSPECIFIED COMPLICATIONS (HCC): ICD-10-CM

## 2020-09-19 LAB
ABO GROUP BLD: NORMAL
ALBUMIN SERPL-MCNC: 4.1 G/DL (ref 3.5–5.2)
ALBUMIN/GLOB SERPL: 1.2 G/DL
ALP SERPL-CCNC: 116 U/L (ref 39–117)
ALT SERPL W P-5'-P-CCNC: 73 U/L (ref 1–33)
ANION GAP SERPL CALCULATED.3IONS-SCNC: 11.5 MMOL/L (ref 5–15)
APTT PPP: 28.5 SECONDS (ref 24–31)
AST SERPL-CCNC: 56 U/L (ref 1–32)
B-HCG UR QL: NEGATIVE
BACTERIA UR QL AUTO: ABNORMAL /HPF
BASOPHILS # BLD AUTO: 0.03 10*3/MM3 (ref 0–0.2)
BASOPHILS NFR BLD AUTO: 0.5 % (ref 0–1.5)
BILIRUB SERPL-MCNC: 0.5 MG/DL (ref 0–1.2)
BILIRUB UR QL STRIP: NEGATIVE
BLD GP AB SCN SERPL QL: NEGATIVE
BUN SERPL-MCNC: 20 MG/DL (ref 6–20)
BUN/CREAT SERPL: 26.7 (ref 7–25)
CALCIUM SPEC-SCNC: 9.7 MG/DL (ref 8.6–10.5)
CHLORIDE SERPL-SCNC: 104 MMOL/L (ref 98–107)
CLARITY UR: CLEAR
CO2 SERPL-SCNC: 23.5 MMOL/L (ref 22–29)
COLOR UR: YELLOW
CREAT SERPL-MCNC: 0.75 MG/DL (ref 0.57–1)
DEPRECATED RDW RBC AUTO: 38.2 FL (ref 37–54)
EOSINOPHIL # BLD AUTO: 0.06 10*3/MM3 (ref 0–0.4)
EOSINOPHIL NFR BLD AUTO: 1 % (ref 0.3–6.2)
ERYTHROCYTE [DISTWIDTH] IN BLOOD BY AUTOMATED COUNT: 11.9 % (ref 12.3–15.4)
GFR SERPL CREATININE-BSD FRML MDRD: 79 ML/MIN/1.73
GLOBULIN UR ELPH-MCNC: 3.5 GM/DL
GLUCOSE SERPL-MCNC: 161 MG/DL (ref 65–99)
GLUCOSE UR STRIP-MCNC: NEGATIVE MG/DL
HCT VFR BLD AUTO: 41.5 % (ref 34–46.6)
HGB BLD-MCNC: 13.7 G/DL (ref 12–15.9)
HGB UR QL STRIP.AUTO: NEGATIVE
HYALINE CASTS UR QL AUTO: ABNORMAL /LPF
INR PPP: 0.97 (ref 0.93–1.1)
KETONES UR QL STRIP: NEGATIVE
LEUKOCYTE ESTERASE UR QL STRIP.AUTO: ABNORMAL
LYMPHOCYTES # BLD AUTO: 1.51 10*3/MM3 (ref 0.7–3.1)
LYMPHOCYTES NFR BLD AUTO: 25.5 % (ref 19.6–45.3)
MCH RBC QN AUTO: 28.5 PG (ref 26.6–33)
MCHC RBC AUTO-ENTMCNC: 33 G/DL (ref 31.5–35.7)
MCV RBC AUTO: 86.3 FL (ref 79–97)
MONOCYTES # BLD AUTO: 0.45 10*3/MM3 (ref 0.1–0.9)
MONOCYTES NFR BLD AUTO: 7.6 % (ref 5–12)
MRSA DNA SPEC QL NAA+PROBE: NORMAL
NEUTROPHILS NFR BLD AUTO: 3.82 10*3/MM3 (ref 1.7–7)
NEUTROPHILS NFR BLD AUTO: 64.7 % (ref 42.7–76)
NITRITE UR QL STRIP: NEGATIVE
PH UR STRIP.AUTO: 6 [PH] (ref 5–8)
PLATELET # BLD AUTO: 124 10*3/MM3 (ref 140–450)
PMV BLD AUTO: 10.6 FL (ref 6–12)
POTASSIUM SERPL-SCNC: 4 MMOL/L (ref 3.5–5.2)
PROT SERPL-MCNC: 7.6 G/DL (ref 6–8.5)
PROT UR QL STRIP: NEGATIVE
PROTHROMBIN TIME: 10.6 SECONDS (ref 9.6–11.7)
RBC # BLD AUTO: 4.81 10*6/MM3 (ref 3.77–5.28)
RBC # UR: ABNORMAL /HPF
REF LAB TEST METHOD: ABNORMAL
RH BLD: NEGATIVE
SODIUM SERPL-SCNC: 139 MMOL/L (ref 136–145)
SP GR UR STRIP: 1.01 (ref 1–1.03)
SQUAMOUS #/AREA URNS HPF: ABNORMAL /HPF
T&S EXPIRATION DATE: NORMAL
UROBILINOGEN UR QL STRIP: ABNORMAL
WBC # BLD AUTO: 5.91 10*3/MM3 (ref 3.4–10.8)
WBC UR QL AUTO: ABNORMAL /HPF

## 2020-09-19 PROCEDURE — 87641 MR-STAPH DNA AMP PROBE: CPT | Performed by: NEUROLOGICAL SURGERY

## 2020-09-19 PROCEDURE — 83036 HEMOGLOBIN GLYCOSYLATED A1C: CPT

## 2020-09-19 PROCEDURE — 93010 ELECTROCARDIOGRAM REPORT: CPT | Performed by: INTERNAL MEDICINE

## 2020-09-19 PROCEDURE — 86850 RBC ANTIBODY SCREEN: CPT | Performed by: NEUROLOGICAL SURGERY

## 2020-09-19 PROCEDURE — 85610 PROTHROMBIN TIME: CPT

## 2020-09-19 PROCEDURE — 81025 URINE PREGNANCY TEST: CPT

## 2020-09-19 PROCEDURE — 85025 COMPLETE CBC W/AUTO DIFF WBC: CPT

## 2020-09-19 PROCEDURE — 81001 URINALYSIS AUTO W/SCOPE: CPT

## 2020-09-19 PROCEDURE — 36415 COLL VENOUS BLD VENIPUNCTURE: CPT

## 2020-09-19 PROCEDURE — 71046 X-RAY EXAM CHEST 2 VIEWS: CPT

## 2020-09-19 PROCEDURE — 86901 BLOOD TYPING SEROLOGIC RH(D): CPT

## 2020-09-19 PROCEDURE — 93005 ELECTROCARDIOGRAM TRACING: CPT | Performed by: NEUROLOGICAL SURGERY

## 2020-09-19 PROCEDURE — 86900 BLOOD TYPING SEROLOGIC ABO: CPT

## 2020-09-19 PROCEDURE — 86900 BLOOD TYPING SEROLOGIC ABO: CPT | Performed by: NEUROLOGICAL SURGERY

## 2020-09-19 PROCEDURE — C9803 HOPD COVID-19 SPEC COLLECT: HCPCS

## 2020-09-19 PROCEDURE — 80053 COMPREHEN METABOLIC PANEL: CPT

## 2020-09-19 PROCEDURE — U0004 COV-19 TEST NON-CDC HGH THRU: HCPCS

## 2020-09-19 PROCEDURE — 85730 THROMBOPLASTIN TIME PARTIAL: CPT

## 2020-09-19 PROCEDURE — 86901 BLOOD TYPING SEROLOGIC RH(D): CPT | Performed by: NEUROLOGICAL SURGERY

## 2020-09-19 PROCEDURE — 87086 URINE CULTURE/COLONY COUNT: CPT

## 2020-09-20 LAB
BACTERIA SPEC AEROBE CULT: NO GROWTH
SARS-COV-2 RNA NOSE QL NAA+PROBE: NOT DETECTED

## 2020-09-21 ENCOUNTER — ANESTHESIA EVENT (OUTPATIENT)
Dept: PERIOP | Facility: HOSPITAL | Age: 60
End: 2020-09-21

## 2020-09-21 LAB — HBA1C MFR BLD: 7.1 % (ref 3.5–5.6)

## 2020-09-22 ENCOUNTER — APPOINTMENT (OUTPATIENT)
Dept: GENERAL RADIOLOGY | Facility: HOSPITAL | Age: 60
End: 2020-09-22

## 2020-09-22 ENCOUNTER — HOSPITAL ENCOUNTER (INPATIENT)
Facility: HOSPITAL | Age: 60
LOS: 1 days | Discharge: HOME | End: 2020-09-23
Attending: NEUROLOGICAL SURGERY | Admitting: NEUROLOGICAL SURGERY

## 2020-09-22 ENCOUNTER — ANESTHESIA (OUTPATIENT)
Dept: PERIOP | Facility: HOSPITAL | Age: 60
End: 2020-09-22

## 2020-09-22 DIAGNOSIS — G95.9 CERVICAL MYELOPATHY (HCC): ICD-10-CM

## 2020-09-22 DIAGNOSIS — Z98.1 S/P CERVICAL SPINAL FUSION: Primary | ICD-10-CM

## 2020-09-22 LAB
GLUCOSE BLDC GLUCOMTR-MCNC: 114 MG/DL (ref 70–105)
GLUCOSE BLDC GLUCOMTR-MCNC: 138 MG/DL (ref 70–105)

## 2020-09-22 PROCEDURE — 25010000002 HYDROMORPHONE PER 4 MG: Performed by: NURSE ANESTHETIST, CERTIFIED REGISTERED

## 2020-09-22 PROCEDURE — 25010000002 FENTANYL CITRATE (PF) 100 MCG/2ML SOLUTION: Performed by: NURSE ANESTHETIST, CERTIFIED REGISTERED

## 2020-09-22 PROCEDURE — 25010000002 PROPOFOL 10 MG/ML EMULSION: Performed by: NURSE ANESTHETIST, CERTIFIED REGISTERED

## 2020-09-22 PROCEDURE — 63081 REMOVE VERT BODY DCMPRN CRVL: CPT | Performed by: NEUROLOGICAL SURGERY

## 2020-09-22 PROCEDURE — 0RG20A0 FUSION OF 2 OR MORE CERVICAL VERTEBRAL JOINTS WITH INTERBODY FUSION DEVICE, ANTERIOR APPROACH, ANTERIOR COLUMN, OPEN APPROACH: ICD-10-PCS | Performed by: NEUROLOGICAL SURGERY

## 2020-09-22 PROCEDURE — 82962 GLUCOSE BLOOD TEST: CPT

## 2020-09-22 PROCEDURE — 22554 ARTHRD ANT NTRBD MIN DSC CRV: CPT | Performed by: NEUROLOGICAL SURGERY

## 2020-09-22 PROCEDURE — 72020 X-RAY EXAM OF SPINE 1 VIEW: CPT

## 2020-09-22 PROCEDURE — 63710000001 INSULIN ISOPHANE & REGULAR PER 5 UNITS: Performed by: NEUROLOGICAL SURGERY

## 2020-09-22 PROCEDURE — 25010000002 DEXAMETHASONE PER 1 MG: Performed by: NURSE ANESTHETIST, CERTIFIED REGISTERED

## 2020-09-22 PROCEDURE — C1713 ANCHOR/SCREW BN/BN,TIS/BN: HCPCS | Performed by: NEUROLOGICAL SURGERY

## 2020-09-22 PROCEDURE — 72040 X-RAY EXAM NECK SPINE 2-3 VW: CPT

## 2020-09-22 PROCEDURE — 25010000003 POTASSIUM CHLORIDE PER 2 MEQ: Performed by: NEUROLOGICAL SURGERY

## 2020-09-22 PROCEDURE — 22585 ARTHRD ANT NTRBD MIN DSC EA: CPT | Performed by: NEUROLOGICAL SURGERY

## 2020-09-22 PROCEDURE — 0RB30ZZ EXCISION OF CERVICAL VERTEBRAL DISC, OPEN APPROACH: ICD-10-PCS | Performed by: NEUROLOGICAL SURGERY

## 2020-09-22 PROCEDURE — 25010000002 ONDANSETRON PER 1 MG: Performed by: NURSE ANESTHETIST, CERTIFIED REGISTERED

## 2020-09-22 PROCEDURE — 25010000002 CEFAZOLIN PER 500 MG: Performed by: NEUROLOGICAL SURGERY

## 2020-09-22 PROCEDURE — L0172 CERV COL SR FOAM 2PC PRE OTS: HCPCS | Performed by: NEUROLOGICAL SURGERY

## 2020-09-22 PROCEDURE — 22854 INSJ BIOMECHANICAL DEVICE: CPT | Performed by: NEUROLOGICAL SURGERY

## 2020-09-22 PROCEDURE — 25010000002 MIDAZOLAM PER 1 MG: Performed by: NURSE ANESTHETIST, CERTIFIED REGISTERED

## 2020-09-22 PROCEDURE — 0RP104Z REMOVAL OF INTERNAL FIXATION DEVICE FROM CERVICAL VERTEBRAL JOINT, OPEN APPROACH: ICD-10-PCS | Performed by: NEUROLOGICAL SURGERY

## 2020-09-22 PROCEDURE — 22845 INSERT SPINE FIXATION DEVICE: CPT | Performed by: NEUROLOGICAL SURGERY

## 2020-09-22 DEVICE — FLOSEAL HEMOSTATIC MATRIX, 10ML
Type: IMPLANTABLE DEVICE | Site: SPINE CERVICAL | Status: FUNCTIONAL
Brand: FLOSEAL HEMOSTATIC MATRIX

## 2020-09-22 DEVICE — IMPLANTABLE DEVICE: Type: IMPLANTABLE DEVICE | Site: SPINE CERVICAL | Status: FUNCTIONAL

## 2020-09-22 DEVICE — ALLOGRFT SPNG OSTEOAMP COMPRESSIBLE SM TALL 10X10X16MM: Type: IMPLANTABLE DEVICE | Site: SPINE CERVICAL | Status: FUNCTIONAL

## 2020-09-22 RX ORDER — SODIUM CHLORIDE 0.9 % (FLUSH) 0.9 %
3 SYRINGE (ML) INJECTION EVERY 12 HOURS SCHEDULED
Status: DISCONTINUED | OUTPATIENT
Start: 2020-09-22 | End: 2020-09-23 | Stop reason: HOSPADM

## 2020-09-22 RX ORDER — ONDANSETRON 2 MG/ML
4 INJECTION INTRAMUSCULAR; INTRAVENOUS EVERY 6 HOURS PRN
Status: DISCONTINUED | OUTPATIENT
Start: 2020-09-22 | End: 2020-09-23 | Stop reason: HOSPADM

## 2020-09-22 RX ORDER — SODIUM CHLORIDE 0.9 % (FLUSH) 0.9 %
10 SYRINGE (ML) INJECTION AS NEEDED
Status: DISCONTINUED | OUTPATIENT
Start: 2020-09-22 | End: 2020-09-23 | Stop reason: HOSPADM

## 2020-09-22 RX ORDER — ACETAMINOPHEN 650 MG/1
650 SUPPOSITORY RECTAL ONCE AS NEEDED
Status: DISCONTINUED | OUTPATIENT
Start: 2020-09-22 | End: 2020-09-22 | Stop reason: HOSPADM

## 2020-09-22 RX ORDER — CEFAZOLIN SODIUM IN 0.9 % NACL 3 G/100 ML
3 INTRAVENOUS SOLUTION, PIGGYBACK (ML) INTRAVENOUS ONCE
Status: DISCONTINUED | OUTPATIENT
Start: 2020-09-22 | End: 2020-09-22 | Stop reason: SDUPTHER

## 2020-09-22 RX ORDER — MEPERIDINE HYDROCHLORIDE 25 MG/ML
12.5 INJECTION INTRAMUSCULAR; INTRAVENOUS; SUBCUTANEOUS
Status: DISCONTINUED | OUTPATIENT
Start: 2020-09-22 | End: 2020-09-22 | Stop reason: HOSPADM

## 2020-09-22 RX ORDER — SODIUM CHLORIDE 0.9 % (FLUSH) 0.9 %
10 SYRINGE (ML) INJECTION AS NEEDED
Status: DISCONTINUED | OUTPATIENT
Start: 2020-09-22 | End: 2020-09-22 | Stop reason: HOSPADM

## 2020-09-22 RX ORDER — HYDROMORPHONE HCL 110MG/55ML
0.5 PATIENT CONTROLLED ANALGESIA SYRINGE INTRAVENOUS
Status: DISCONTINUED | OUTPATIENT
Start: 2020-09-22 | End: 2020-09-23 | Stop reason: HOSPADM

## 2020-09-22 RX ORDER — SODIUM CHLORIDE 0.9 % (FLUSH) 0.9 %
3-10 SYRINGE (ML) INJECTION AS NEEDED
Status: DISCONTINUED | OUTPATIENT
Start: 2020-09-22 | End: 2020-09-22 | Stop reason: HOSPADM

## 2020-09-22 RX ORDER — ONDANSETRON 2 MG/ML
4 INJECTION INTRAMUSCULAR; INTRAVENOUS ONCE AS NEEDED
Status: DISCONTINUED | OUTPATIENT
Start: 2020-09-22 | End: 2020-09-22 | Stop reason: HOSPADM

## 2020-09-22 RX ORDER — ROSUVASTATIN CALCIUM 10 MG/1
10 TABLET, COATED ORAL NIGHTLY
Status: DISCONTINUED | OUTPATIENT
Start: 2020-09-22 | End: 2020-09-23 | Stop reason: HOSPADM

## 2020-09-22 RX ORDER — SODIUM CHLORIDE, SODIUM LACTATE, POTASSIUM CHLORIDE, CALCIUM CHLORIDE 600; 310; 30; 20 MG/100ML; MG/100ML; MG/100ML; MG/100ML
9 INJECTION, SOLUTION INTRAVENOUS CONTINUOUS PRN
Status: DISCONTINUED | OUTPATIENT
Start: 2020-09-22 | End: 2020-09-22 | Stop reason: HOSPADM

## 2020-09-22 RX ORDER — ROCURONIUM BROMIDE 10 MG/ML
INJECTION, SOLUTION INTRAVENOUS AS NEEDED
Status: DISCONTINUED | OUTPATIENT
Start: 2020-09-22 | End: 2020-09-22 | Stop reason: SURG

## 2020-09-22 RX ORDER — LIDOCAINE HYDROCHLORIDE 20 MG/ML
INJECTION, SOLUTION EPIDURAL; INFILTRATION; INTRACAUDAL; PERINEURAL AS NEEDED
Status: DISCONTINUED | OUTPATIENT
Start: 2020-09-22 | End: 2020-09-22 | Stop reason: SURG

## 2020-09-22 RX ORDER — MORPHINE SULFATE 4 MG/ML
2 INJECTION, SOLUTION INTRAMUSCULAR; INTRAVENOUS
Status: DISCONTINUED | OUTPATIENT
Start: 2020-09-22 | End: 2020-09-22 | Stop reason: HOSPADM

## 2020-09-22 RX ORDER — CYCLOBENZAPRINE HCL 10 MG
10 TABLET ORAL NIGHTLY
Status: DISCONTINUED | OUTPATIENT
Start: 2020-09-22 | End: 2020-09-23 | Stop reason: HOSPADM

## 2020-09-22 RX ORDER — ONDANSETRON 2 MG/ML
INJECTION INTRAMUSCULAR; INTRAVENOUS AS NEEDED
Status: DISCONTINUED | OUTPATIENT
Start: 2020-09-22 | End: 2020-09-22 | Stop reason: SURG

## 2020-09-22 RX ORDER — ACETAMINOPHEN 325 MG/1
650 TABLET ORAL ONCE AS NEEDED
Status: DISCONTINUED | OUTPATIENT
Start: 2020-09-22 | End: 2020-09-22 | Stop reason: HOSPADM

## 2020-09-22 RX ORDER — FENTANYL CITRATE 50 UG/ML
INJECTION, SOLUTION INTRAMUSCULAR; INTRAVENOUS AS NEEDED
Status: DISCONTINUED | OUTPATIENT
Start: 2020-09-22 | End: 2020-09-22 | Stop reason: SURG

## 2020-09-22 RX ORDER — SODIUM CHLORIDE AND POTASSIUM CHLORIDE 150; 450 MG/100ML; MG/100ML
100 INJECTION, SOLUTION INTRAVENOUS CONTINUOUS
Status: DISCONTINUED | OUTPATIENT
Start: 2020-09-22 | End: 2020-09-23 | Stop reason: HOSPADM

## 2020-09-22 RX ORDER — PROPOFOL 10 MG/ML
VIAL (ML) INTRAVENOUS AS NEEDED
Status: DISCONTINUED | OUTPATIENT
Start: 2020-09-22 | End: 2020-09-22 | Stop reason: SURG

## 2020-09-22 RX ORDER — SODIUM CHLORIDE 0.9 % (FLUSH) 0.9 %
3 SYRINGE (ML) INJECTION EVERY 12 HOURS SCHEDULED
Status: DISCONTINUED | OUTPATIENT
Start: 2020-09-22 | End: 2020-09-22 | Stop reason: HOSPADM

## 2020-09-22 RX ORDER — IPRATROPIUM BROMIDE AND ALBUTEROL SULFATE 2.5; .5 MG/3ML; MG/3ML
3 SOLUTION RESPIRATORY (INHALATION) ONCE AS NEEDED
Status: DISCONTINUED | OUTPATIENT
Start: 2020-09-22 | End: 2020-09-22 | Stop reason: HOSPADM

## 2020-09-22 RX ORDER — LISINOPRIL 20 MG/1
20 TABLET ORAL DAILY
Status: DISCONTINUED | OUTPATIENT
Start: 2020-09-23 | End: 2020-09-23 | Stop reason: HOSPADM

## 2020-09-22 RX ORDER — DEXAMETHASONE SODIUM PHOSPHATE 4 MG/ML
INJECTION, SOLUTION INTRA-ARTICULAR; INTRALESIONAL; INTRAMUSCULAR; INTRAVENOUS; SOFT TISSUE AS NEEDED
Status: DISCONTINUED | OUTPATIENT
Start: 2020-09-22 | End: 2020-09-22 | Stop reason: SURG

## 2020-09-22 RX ORDER — GLYCOPYRROLATE 1 MG/5 ML
SYRINGE (ML) INTRAVENOUS AS NEEDED
Status: DISCONTINUED | OUTPATIENT
Start: 2020-09-22 | End: 2020-09-22 | Stop reason: SURG

## 2020-09-22 RX ORDER — MIDAZOLAM HYDROCHLORIDE 1 MG/ML
INJECTION INTRAMUSCULAR; INTRAVENOUS AS NEEDED
Status: DISCONTINUED | OUTPATIENT
Start: 2020-09-22 | End: 2020-09-22 | Stop reason: SURG

## 2020-09-22 RX ORDER — HYDROMORPHONE HCL 110MG/55ML
0.5 PATIENT CONTROLLED ANALGESIA SYRINGE INTRAVENOUS
Status: DISCONTINUED | OUTPATIENT
Start: 2020-09-22 | End: 2020-09-22 | Stop reason: HOSPADM

## 2020-09-22 RX ORDER — SODIUM CHLORIDE 0.9 % (FLUSH) 0.9 %
10 SYRINGE (ML) INJECTION EVERY 12 HOURS SCHEDULED
Status: DISCONTINUED | OUTPATIENT
Start: 2020-09-22 | End: 2020-09-22 | Stop reason: HOSPADM

## 2020-09-22 RX ORDER — HYDROCODONE BITARTRATE AND ACETAMINOPHEN 7.5; 325 MG/1; MG/1
2 TABLET ORAL EVERY 4 HOURS PRN
Status: DISCONTINUED | OUTPATIENT
Start: 2020-09-22 | End: 2020-09-23 | Stop reason: HOSPADM

## 2020-09-22 RX ORDER — NALOXONE HCL 0.4 MG/ML
0.4 VIAL (ML) INJECTION
Status: DISCONTINUED | OUTPATIENT
Start: 2020-09-22 | End: 2020-09-23 | Stop reason: HOSPADM

## 2020-09-22 RX ORDER — LABETALOL HYDROCHLORIDE 5 MG/ML
5 INJECTION, SOLUTION INTRAVENOUS
Status: DISCONTINUED | OUTPATIENT
Start: 2020-09-22 | End: 2020-09-22 | Stop reason: HOSPADM

## 2020-09-22 RX ORDER — NEOSTIGMINE METHYLSULFATE 5 MG/5 ML
SYRINGE (ML) INTRAVENOUS AS NEEDED
Status: DISCONTINUED | OUTPATIENT
Start: 2020-09-22 | End: 2020-09-22 | Stop reason: SURG

## 2020-09-22 RX ADMIN — ROSUVASTATIN CALCIUM 10 MG: 10 TABLET, FILM COATED ORAL at 20:12

## 2020-09-22 RX ADMIN — SODIUM CHLORIDE AND POTASSIUM CHLORIDE 100 ML/HR: 4.5; 1.49 INJECTION, SOLUTION INTRAVENOUS at 19:40

## 2020-09-22 RX ADMIN — ONDANSETRON 4 MG: 2 INJECTION INTRAMUSCULAR; INTRAVENOUS at 14:44

## 2020-09-22 RX ADMIN — SODIUM CHLORIDE, SODIUM LACTATE, POTASSIUM CHLORIDE, AND CALCIUM CHLORIDE: .6; .31; .03; .02 INJECTION, SOLUTION INTRAVENOUS at 14:08

## 2020-09-22 RX ADMIN — Medication 4 MG: at 14:44

## 2020-09-22 RX ADMIN — FENTANYL CITRATE 50 MCG: 50 INJECTION, SOLUTION INTRAMUSCULAR; INTRAVENOUS at 12:30

## 2020-09-22 RX ADMIN — HYDROCODONE BITARTRATE AND ACETAMINOPHEN 2 TABLET: 7.5; 325 TABLET ORAL at 20:12

## 2020-09-22 RX ADMIN — INSULIN HUMAN 30 UNITS: 100 INJECTION, SUSPENSION SUBCUTANEOUS at 19:40

## 2020-09-22 RX ADMIN — CYCLOBENZAPRINE HYDROCHLORIDE 10 MG: 10 TABLET, FILM COATED ORAL at 20:12

## 2020-09-22 RX ADMIN — Medication 10 ML: at 20:12

## 2020-09-22 RX ADMIN — SODIUM CHLORIDE, SODIUM LACTATE, POTASSIUM CHLORIDE, AND CALCIUM CHLORIDE 9 ML/HR: .6; .31; .03; .02 INJECTION, SOLUTION INTRAVENOUS at 10:39

## 2020-09-22 RX ADMIN — MIDAZOLAM 2 MG: 1 INJECTION INTRAMUSCULAR; INTRAVENOUS at 12:28

## 2020-09-22 RX ADMIN — LIDOCAINE HYDROCHLORIDE 100 MG: 20 INJECTION, SOLUTION EPIDURAL; INFILTRATION; INTRACAUDAL; PERINEURAL at 12:30

## 2020-09-22 RX ADMIN — PROPOFOL 30 MCG/KG/MIN: 10 INJECTION, EMULSION INTRAVENOUS at 12:38

## 2020-09-22 RX ADMIN — CEFAZOLIN SODIUM 2 G: 1 INJECTION, POWDER, FOR SOLUTION INTRAMUSCULAR; INTRAVENOUS at 12:34

## 2020-09-22 RX ADMIN — HYDROMORPHONE HYDROCHLORIDE 0.5 MG: 2 INJECTION, SOLUTION INTRAMUSCULAR; INTRAVENOUS; SUBCUTANEOUS at 15:58

## 2020-09-22 RX ADMIN — Medication 0.8 MG: at 14:44

## 2020-09-22 RX ADMIN — ROCURONIUM BROMIDE 50 MG: 10 INJECTION, SOLUTION INTRAVENOUS at 12:30

## 2020-09-22 RX ADMIN — FENTANYL CITRATE 50 MCG: 50 INJECTION, SOLUTION INTRAMUSCULAR; INTRAVENOUS at 12:28

## 2020-09-22 RX ADMIN — PROPOFOL 150 MG: 10 INJECTION, EMULSION INTRAVENOUS at 12:30

## 2020-09-22 RX ADMIN — DEXAMETHASONE SODIUM PHOSPHATE 4 MG: 4 INJECTION, SOLUTION INTRAMUSCULAR; INTRAVENOUS at 12:44

## 2020-09-22 RX ADMIN — CEFAZOLIN 2 G: 10 INJECTION, POWDER, FOR SOLUTION INTRAVENOUS at 20:12

## 2020-09-22 NOTE — ANESTHESIA PROCEDURE NOTES
Airway  Urgency: elective    Date/Time: 9/22/2020 12:31 PM  End Time:9/22/2020 12:32 PM  Airway not difficult    General Information and Staff    Patient location during procedure: OR  Anesthesiologist: Montez Cabrera MD  CRNA: Kylie Valdez CRNA    Indications and Patient Condition  Indications for airway management: airway protection    Preoxygenated: yes  Mask difficulty assessment: 1 - vent by mask    Final Airway Details  Final airway type: endotracheal airway      Successful airway: ETT  Cuffed: yes   Successful intubation technique: video laryngoscopy  Facilitating devices/methods: intubating stylet  Endotracheal tube insertion site: oral  Blade: Glidescope  Blade size: 3  ETT size (mm): 7.5  Cormack-Lehane Classification: grade I - full view of glottis  Placement verified by: chest auscultation, capnometry and palpation of cuff   Measured from: teeth  ETT/EBT  to teeth (cm): 21  Number of attempts at approach: 1  Assessment: lips, teeth, and gum same as pre-op and atraumatic intubation    Additional Comments  Noticed right upper canine was loose prior to DL.  Secured tube away from loose tooth and placed bite block and esophageal temp probe on other side, so there was no pressure against tooth.

## 2020-09-22 NOTE — OP NOTE
CERVICAL FUSION ANTERIOR WITH ARTIFICIAL DISCECTOMY IMPLANTATION  Procedure Report    Patient Name:  Gladys Garrison  YOB: 1960    Date of Surgery:  9/22/2020     Indications:   Mrs. Garrison is a 60-year-old lady who is here suffering from adjacent segment degenerative disease with myelopathy.  She is here today for C4 vertebrectomy with a C3-5 fusion removal of anterior prior hardware from C5-7.    Pre-op Diagnosis:   Cervical myelopathy (CMS/HCC) [G95.9]       Post-Op Diagnosis Codes:     * Cervical myelopathy (CMS/HCC) [G95.9]    Procedure/CPT® Codes:  1.  C4 vertebrectomy with C3-5 arthrodesis (Innovasis Oryx plate)  2.  Use of PEEK interbody cage (Camber Verta 19 x 12 x 14 mm 7 degree)  3.  Removal of prior cervical plate C5-7  4.  Harvesting of autologous bone graft   6.  Use of allograft (Bioventus Osteoamp)    Procedure(s):  C4 VERTEBRECTOMY AND ANTERIOR CERIVCAL DISCECTOMY WITH FUSION OF CERVICAL THREE THROUGH FIVE WITH REMOVAL OF HARDWARE C5-C7    Staff:  Surgeon(s):  Mark Kaba MD         Anesthesia: General    Estimated Blood Loss: 425 mL    Implants:    Implant Name Type Inv. Item Serial No.  Lot No. LRB No. Used Action   KT SEAL HEMOS ABS FLOSEAL MATRX FAST/PREP 10ML - [id number] Implant KT SEAL HEMOS ABS FLOSEAL MATRX FAST/PREP 10ML  FORREST [employer] [id number] N/A 3 Implanted   ALLOGRFT SPNG OSTEOAMP COMPRESSIBLE SM TALL [id number] - [id number] - [id number] Implant ALLOGRFT SPNG OSTEOAMP COMPRESSIBLE SM TALL [id number] [employer] . N/A 1 Implanted   Cerrvical plate, 4 screws and 2 set screws     . N/A 1 Explanted   PLT CERV ORYX LORD 3LVL TI 1.9X42MM - [id number] Implant PLT CERV ORYX LORD 3LVL TI 1.9X42MM  [employer] . N/A 1 Implanted   SCRW BONE ORYX V/ANGL S/TAP TI 4X14MM - [id number] Implant SCRW BONE ORYX V/ANGL S/TAP TI 4X14MM  [employer] . N/A 2 Implanted   SCRW BONE ORYX FIX/ANGL S/TAP TI 4X14MM - [id number] Implant SCRW BONE ORYX FIX/ANGL S/TAP  TI 4X14MM  [employer] . N/A 4 Implanted   CAGE CORPECTOMY VERTA PEEK 7DEG 19HL 12X14.5MM - [id number] Implant CAGE CORPECTOMY VERTA PEEK 7DEG 19HL 12X14.5MM  [employer] . N/A 1 Implanted       Specimen:          None        Findings: Dictated    Complications: None    Description of Procedure: The patient was placed under general endotracheal anesthesia, intubated, and placed on the operating table in the supine position.  The neuro monitoring technician then placed electrodes for free running EMG and SSEP.  A transverse shoulder roll was then placed in the head was hyperextended and rested on a surgical pillow.   All pressure points were adequately padded and inspected and the arms were tucked.  The neck was prepped and draped in a sterile fashion.  An incision was made in the right lower portion of the neck with the midportion of the incision being at the medial border of the sternocleidomastoid muscle at the prior incision site.  Hemostasis was achieved with bipolar cautery and using pickups and Metzenbaum scissors the subcutaneous tissue was dissected off of the platysma.  At this time the platysma was divided longitudinally and dissection was carried down through the middle cervical fascia, between the tracheoesophageal complex and carotid complex.  Dissection was carried above the omohyoid muscle which had been mobilized.  Dissection was carried down to the anterior aspect of the cervical spine.  Dissection was then carried up to the prior scar tissue until the prior cervical plate was encountered.  Bovie cautery was then used to dissect the soft tissue off of the prior plate and to expose the C3-5 vertebral segments.  There is significant mount of bony overgrowth the superior portion plate.  Once the plate was exposed using the universal remover the screws were removed and the plate was then pried free with an up-biting cervical curette and then removed with a pituitary rongeur.  Bleeding was  controlled with FloSeal.  At this time a Leksell rongeur was then used to remove the anterior osteophytes.  Self-retaining retractors were then placed.  Annulotomy's were then performed at the C3-4 and C4-5 segments and using a combination of curettes and pituitary rongeurs as much of the disc which could be removed was.  The endplates superiorly and inferiorly at C4 were then drilled away and using a Leksell rongeur as much bone which could be harvested for graft was obtained.  At this time high-speed drill was then used to drill away the remaining bone all the way to the posterior wall.  Approximately 70% the vertebral body was removed.  The posterior longitudinal ligament and remaining posterior cortical bone was then undermined with a sharp nerve hook and then removed in piecemeal fashion with a 2 m Kerrison punch.  The posterior inferior aspect of the C3 vertebral body was then drilled away as well as the posterior aspect of the superior portion of the C5 vertebral body.  The 2 mm Kerrison punch was then used to remove any remaining overhanging fragments and bone until adequate decompression had been performed.  Foraminotomies were performed at C3-4 and C4-5.  Bleeding was controlled FloSeal.  The wounds copiously irrigated with bacitracin irrigation.  Calipers were placed and was felt that a 19 mm length cage was of appropriate size.  The cage was packed with a combination of allograft and the autograft.  Anesthesia placed gentle traction on the cervical spine by pulling on the head and the cage was tapped into position under direct vision.  Once in adequate position a 42 mmplate was then attached to the front of the cervical spine using 14 mm screws.  The plate was extended inferiorly to incorporate screws into the prior fusion mass for better stabilization.  The retractors were removed and final x-rays taken. Bleeding was controlled with the FloSeal which was then irrigated out.  Inspection demonstrated  hemostasis.  A 10 [place] Hemovac drain was placed in the anterior cervical space and tunneled subcutaneously. It was then sewn into place and the incision was closed in layers, dressed and the patient was taken to the recovery room in stable condition.  Sponge instrument and needle counts were correct at the end of the procedure.           Mark Kaba MD     Date: 9/22/2020  Time: 15:01 EDT

## 2020-09-22 NOTE — ANESTHESIA PREPROCEDURE EVALUATION
Anesthesia Evaluation     Patient summary reviewed and Nursing notes reviewed   NPO Solid Status: > 8 hours  NPO Liquid Status: > 8 hours           Airway   Mallampati: II  TM distance: >3 FB  Neck ROM: full  No difficulty expected  Dental    (+) poor dentition        Pulmonary    (+) sleep apnea,   Cardiovascular     (+) pacemaker pacemaker, hypertension, valvular problems/murmurs, hyperlipidemia,       Neuro/Psych  GI/Hepatic/Renal/Endo    (+)   hepatitis C, liver disease, diabetes mellitus,     Musculoskeletal     Abdominal     Bowel sounds: normal.   Substance History      OB/GYN          Other   arthritis,    history of cancer    ROS/Med Hx Other: EF 56%.  5-6 heart operations for tetralogy of fallot.  Breast ca                Anesthesia Plan    ASA 3     general     intravenous induction     Anesthetic plan, all risks, benefits, and alternatives have been provided, discussed and informed consent has been obtained with: patient.    Plan discussed with CRNA.

## 2020-09-22 NOTE — INTERVAL H&P NOTE
H&P updated. The patient was examined and the following changes are noted:  ROS all pertinent symptoms have been updated and all others are negative.

## 2020-09-22 NOTE — ANESTHESIA POSTPROCEDURE EVALUATION
Patient: Gladys Garrison    Procedure Summary     Date: 09/22/20 Room / Location: [facility] OR 11 / [facility] MAIN OR    Anesthesia Start: 1224 Anesthesia Stop: 1458    Procedure: C4 VERTEBRECTOMY AND ANTERIOR CERIVCAL DISCECTOMY WITH FUSION OF CERVICAL THREE THROUGH FIVE WITH REMOVAL OF HARDWARE C5-C6 (N/A Spine Cervical) Diagnosis:       Cervical myelopathy (CMS/HCC)      (Cervical myelopathy (CMS/HCC) [G95.9])    Surgeon: Mark Kaba MD Provider: Montez Cabrera MD    Anesthesia Type: general ASA Status: 3          Anesthesia Type: general    Vitals  Vitals Value Taken Time   /87 09/22/20 1520   Temp 98.6 °F (37 °C) 09/22/20 1458   Pulse 74 09/22/20 1520   Resp 13 09/22/20 1513   SpO2 99 % 09/22/20 1520   Vitals shown include unvalidated device data.        Post Anesthesia Care and Evaluation    Patient location during evaluation: PACU  Patient participation: complete - patient participated  Level of consciousness: awake  Pain scale: See nurse's notes for pain score.  Pain management: adequate  Airway patency: patent  Anesthetic complications: No anesthetic complications  PONV Status: none  Cardiovascular status: acceptable  Respiratory status: acceptable  Hydration status: acceptable    Comments: Patient seen and examined postoperatively; vital signs stable; SpO2 greater than or equal to 90%; cardiopulmonary status stable; nausea/vomiting adequately controlled; pain adequately controlled; no apparent anesthesia complications; patient discharged from anesthesia care when discharge criteria were met

## 2020-09-22 NOTE — PLAN OF CARE
Goal Outcome Evaluation:  Plan of Care Reviewed With: patient, spouse      Patient arrived to the unit- tx plan initiated

## 2020-09-23 ENCOUNTER — READMISSION MANAGEMENT (OUTPATIENT)
Dept: CALL CENTER | Facility: HOSPITAL | Age: 60
End: 2020-09-23

## 2020-09-23 VITALS
OXYGEN SATURATION: 98 % | SYSTOLIC BLOOD PRESSURE: 131 MMHG | DIASTOLIC BLOOD PRESSURE: 62 MMHG | RESPIRATION RATE: 12 BRPM | TEMPERATURE: 98.6 F | WEIGHT: 173 LBS | BODY MASS INDEX: 32.66 KG/M2 | HEIGHT: 61 IN | HEART RATE: 87 BPM

## 2020-09-23 LAB
GLUCOSE BLDC GLUCOMTR-MCNC: 174 MG/DL (ref 70–105)
GLUCOSE BLDC GLUCOMTR-MCNC: 179 MG/DL (ref 70–105)
GLUCOSE BLDC GLUCOMTR-MCNC: 197 MG/DL (ref 70–105)
HCT VFR BLD AUTO: 38.4 % (ref 34–46.6)
HGB BLD-MCNC: 12.6 G/DL (ref 12–15.9)

## 2020-09-23 PROCEDURE — 63710000001 INSULIN ISOPHANE & REGULAR PER 5 UNITS: Performed by: NEUROLOGICAL SURGERY

## 2020-09-23 PROCEDURE — 85014 HEMATOCRIT: CPT | Performed by: NURSE PRACTITIONER

## 2020-09-23 PROCEDURE — 97535 SELF CARE MNGMENT TRAINING: CPT

## 2020-09-23 PROCEDURE — 97161 PT EVAL LOW COMPLEX 20 MIN: CPT

## 2020-09-23 PROCEDURE — 97530 THERAPEUTIC ACTIVITIES: CPT

## 2020-09-23 PROCEDURE — 25010000002 CEFAZOLIN PER 500 MG: Performed by: NEUROLOGICAL SURGERY

## 2020-09-23 PROCEDURE — 82962 GLUCOSE BLOOD TEST: CPT

## 2020-09-23 PROCEDURE — 85018 HEMOGLOBIN: CPT | Performed by: NURSE PRACTITIONER

## 2020-09-23 PROCEDURE — 25010000003 POTASSIUM CHLORIDE PER 2 MEQ: Performed by: NEUROLOGICAL SURGERY

## 2020-09-23 RX ORDER — HYDROCODONE BITARTRATE AND ACETAMINOPHEN 7.5; 325 MG/1; MG/1
1-2 TABLET ORAL EVERY 4 HOURS PRN
Qty: 60 TABLET | Refills: 0 | Status: SHIPPED | OUTPATIENT
Start: 2020-09-23 | End: 2020-10-16

## 2020-09-23 RX ADMIN — LISINOPRIL 20 MG: 20 TABLET ORAL at 08:44

## 2020-09-23 RX ADMIN — Medication 3 ML: at 08:44

## 2020-09-23 RX ADMIN — INSULIN HUMAN 40 UNITS: 100 INJECTION, SUSPENSION SUBCUTANEOUS at 08:44

## 2020-09-23 RX ADMIN — HYDROCODONE BITARTRATE AND ACETAMINOPHEN 2 TABLET: 7.5; 325 TABLET ORAL at 08:53

## 2020-09-23 RX ADMIN — CEFAZOLIN 2 G: 10 INJECTION, POWDER, FOR SOLUTION INTRAVENOUS at 04:26

## 2020-09-23 RX ADMIN — SODIUM CHLORIDE AND POTASSIUM CHLORIDE 100 ML/HR: 4.5; 1.49 INJECTION, SOLUTION INTRAVENOUS at 04:26

## 2020-09-23 NOTE — PLAN OF CARE
Goal Outcome Evaluation:  Plan of Care Reviewed With: patient  Progress: improving       Plan to discharge home later this evening. Hemovac has been removed.

## 2020-09-23 NOTE — PLAN OF CARE
Goal Outcome Evaluation:  Plan of Care Reviewed With: patient  Progress: improving  Outcome Summary: Getting in and out bed easier each time. Pain controlled with pills

## 2020-09-23 NOTE — THERAPY EVALUATION
Patient Name: Gladys Garrison  : 1960    MRN: [id number]                              Today's Date: 2020       Admit Date: 2020    Visit Dx:     ICD-10-CM ICD-9-CM   1. Cervical myelopathy (CMS/HCC)  G95.9 721.1     Patient Active Problem List   Diagnosis   • Chest pain   • Hyperlipidemia   • Hypertensive disorder   • Osteoarthritis of multiple joints   • Pulmonary hypertension (CMS/HCC)   • Pulmonary valve disorder   • Malignant tumor of breast (CMS/HCC)   • Tetralogy of Fallot   • Tricuspid valve regurgitation   • Controlled type 2 diabetes mellitus without complication, with long-term current use of insulin (CMS/HCC)   • Vitamin D deficiency   • Acquired spondylolisthesis of cervical vertebra   • Adjacent segment disease with spinal stenosis   • Cervical spondylosis with myelopathy   • Cervical myelopathy (CMS/HCC)     Past Medical History:   Diagnosis Date   • Breast cancer (CMS/HCC) 2017    mets to lymph nodes; did not do radiation   • Cancer of unknown origin (CMS/HCC)    • Compression fx, thoracic spine, open, initial encounter (CMS/HCC)    • Diabetes mellitus (CMS/HCC)    • Heart disease, unspecified    • Hepatitis C    • Hypertension    • Sleep apnea     no machine   • Type 2 diabetes mellitus (CMS/HCC) 2017     Past Surgical History:   Procedure Laterality Date   • BACK SURGERY      neck   • BREAST RECONSTRUCTION Bilateral    • CARDIAC ABLATION       atrial tachycardia x 5 ablations    • CARDIAC SURGERY      stent placed in aorta   • CARDIAC SURGERY      6 surgeries as baby    •  SECTION      x2   • KNEE SURGERY     • MASTECTOMY Bilateral    • NECK SURGERY     • PACEMAKER IMPLANTATION       General Information     Row Name 20 1425          Physical Therapy Time and Intention    Document Type  evaluation  -CR     Mode of Treatment  physical therapy  -CR     Row Name 20 1425          General Information    Patient Profile Reviewed  yes  -CR     Prior Level of  Function  independent:;all household mobility;ADL's  -CR     Existing Precautions/Restrictions  spinal  -CR     Row Name 09/23/20 1425          Living Environment    Lives With  spouse  -CR     Row Name 09/23/20 1425          Home Main Entrance    Number of Stairs, Main Entrance  one  -CR     Row Name 09/23/20 1425          Stairs Within Home, Primary    Stairs, Within Home, Primary  flight to bedroom  -CR     Number of Stairs, Within Home, Primary  one  -CR     Stair Railings, Within Home, Primary  railings safe and in good condition  -CR     Row Name 09/23/20 1425          Cognition    Orientation Status (Cognition)  oriented x 4  -CR     Row Name 09/23/20 1425          Safety Issues, Functional Mobility    Impairments Affecting Function (Mobility)  range of motion (ROM);pain;endurance/activity tolerance;coordination  -CR       User Key  (r) = Recorded By, (t) = Taken By, (c) = Cosigned By    Initials Name Provider Type    CR Reyes, Carmela, PT Physical Therapist        Mobility     Row Name 09/23/20 1426          Bed Mobility    Bed Mobility  supine-sit-supine  -CR     Supine-Sit-Supine [place] (Bed Mobility)  standby assist;verbal cues  -CR     Assistive Device (Bed Mobility)  bed rails  -CR     Row Name 09/23/20 1426          Sit-Stand Transfer    Sit-Stand [place] (Transfers)  minimum assist (75% patient effort)  -CR     Row Name 09/23/20 1426          Gait/Stairs (Locomotion)    [place] Level (Gait)  minimum assist (75% patient effort)  -CR     Assistive Device (Gait)  -- HHa  -CR     Distance in Feet (Gait)  20 x 4  -CR     Deviations/Abnormal Patterns (Gait)  gait speed decreased;festinating/shuffling  -CR       User Key  (r) = Recorded By, (t) = Taken By, (c) = Cosigned By    Initials Name Provider Type    CR Reyes, Carmela, PT Physical Therapist        Obj/Interventions     Row Name 09/23/20 1433          Range of Motion Comprehensive    General Range of Motion  bilateral lower extremity  ROM WFL;upper extremity range of motion deficits identified  -CR     Comment, General Range of Motion  min limit BUE due to pain  -CR     Row Name 09/23/20 1433          Strength Comprehensive (MMT)    Comment, General Manual Muscle Testing (MMT) Assessment  BUe?le grossly 3/5  -CR     Row Name 09/23/20 1433          Balance    Balance Assessment  sitting static balance;standing static balance;standing dynamic balance  -CR     Static Sitting Balance  WFL  -CR     Static Standing Balance  WFL  -CR     Dynamic Standing Balance  mild impairment  -CR       User Key  (r) = Recorded By, (t) = Taken By, (c) = Cosigned By    Initials Name Provider Type    CR Reyes, Carmela, PT Physical Therapist        Goals/Plan    No documentation.       Clinical Impression     Row Name 09/23/20 1437          Pain    Additional Documentation  Pain Scale: Numbers Pre/Post-Treatment (Group)  -CR     Row Name 09/23/20 1437          Pain Scale: Numbers Pre/Post-Treatment    Pretreatment Pain Rating  6/10  -CR     Posttreatment Pain Rating  6/10  -CR     Pain Location - Side  Bilateral  -CR     Pain Location - Orientation  incisional  -CR     Pain Location  neck  -CR     Pain Intervention(s)  Medication (See MAR);Cold pack  -CR     Row Name 09/23/20 1437          Plan of Care Review    Plan of Care Reviewed With  patient;spouse  -CR     Outcome Summary  61 y/o s/p C4 vertebrectomy , C3-5 fusion on 9/22 due to cervical myelopathy. Pt reporting of pain incision site and unable to tolerate Aspen collar. Pt has soft cervical collar which was too big despite modfication on chin rest; smaller soft collar issued. Pt able to perform supine <>sit transfer with verbal cues and extra time without physical assistance, did report of pain. Advised to sleep in recliner at home especially during day. Pt able to tolerate ambulation in room x 4 with HHA. Reported of lightheadedness but improved with time up. Educated on activity modification, restrictions. Pt  should be safe to d/c home with assistance for household chores. PPE gloves, mask with shield.  -CR     Row Name 09/23/20 1437          Therapy Assessment/Plan (PT)    Patient/Family Therapy Goals Statement (PT)  I really want to go home today  -CR     Criteria for Skilled Interventions Met (PT)  no;skilled treatment is necessary  -CR     Row Name 09/23/20 1437          Positioning and Restraints    Pre-Treatment Position  in bed  -CR     Post Treatment Position  bed  -CR     In Bed  notified nsg;supine;call light within reach;with family/caregiver  -CR       User Key  (r) = Recorded By, (t) = Taken By, (c) = Cosigned By    Initials Name Provider Type    CR Reyes, Carmela, PT Physical Therapist        Outcome Measures     Row Name 09/23/20 1444          How much help from another person do you currently need...    Turning from your back to your side while in flat bed without using bedrails?  4  -CR     Moving from lying on back to sitting on the side of a flat bed without bedrails?  4  -CR     Moving to and from a bed to a chair (including a wheelchair)?  3  -CR     Standing up from a chair using your arms (e.g., wheelchair, bedside chair)?  3  -CR     Climbing 3-5 steps with a railing?  3  -CR     To walk in hospital room?  3  -CR     AM-PAC 6 Clicks Score (PT)  20  -CR     Row Name 09/23/20 1444          Functional Assessment    Outcome Measure Options  AM-PAC 6 Clicks Basic Mobility (PT)  -CR       User Key  (r) = Recorded By, (t) = Taken By, (c) = Cosigned By    Initials Name Provider Type    CR Reyes, Carmela, PT Physical Therapist        Physical Therapy Education                 Title: PT OT SLP Therapies (Done)     Topic: Physical Therapy (Done)     Point: Mobility training (Done)     Learning Progress Summary           Patient Acceptance, E,TB, VU,DU by CR at 9/23/2020 1445                   Point: Precautions (Done)     Learning Progress Summary           Patient Acceptance, E,TB, VU,DU by CR at 9/23/2020  1445                               User Key     Initials Effective Dates Name Provider Type Discipline    CR 03/01/19 -  Reyes, Carmela, PT Physical Therapist PT              PT Recommendation and Plan     Plan of Care Reviewed With: patient, spouse  Outcome Summary: 59 y/o s/p C4 vertebrectomy , C3-5 fusion on 9/22 due to cervical myelopathy. Pt reporting of pain incision site and unable to tolerate Aspen collar. Pt has soft cervical collar which was too big despite modfication on chin rest; smaller soft collar issued. Pt able to perform supine <>sit transfer with verbal cues and extra time without physical assistance, did report of pain. Advised to sleep in recliner at home especially during day. Pt able to tolerate ambulation in room x 4 with HHA. Reported of lightheadedness but improved with time up. Educated on activity modification, restrictions. Pt should be safe to d/c home with assistance for household chores. PPE gloves, mask with shield.     Time Calculation:   PT Charges     Row Name 09/23/20 1447             Time Calculation    Start Time  1000  -CR      Stop Time  1045  -CR      Time Calculation (min)  45 min  -CR      PT Received On  09/23/20  -CR         Time Calculation- PT    Total Timed Code Minutes- PT  28 minute(s)  -CR        User Key  (r) = Recorded By, (t) = Taken By, (c) = Cosigned By    Initials Name Provider Type    CR Reyes, Carmela, PT Physical Therapist        Therapy Charges for Today     Code Description Service Date Service Provider Modifiers Qty    [id number]  PT THERAPEUTIC ACT EA 15 MIN 9/23/2020 Reyes, Carmela, PT GP 1    98939135799 HC PT EVAL LOW COMPLEXITY 3 9/23/2020 Reyes, Carmela, PT GP 1    [id number]  PT SELF CARE/MGMT/TRAIN EA 15 MIN 9/23/2020 Reyes, Carmela, PT GP 1          PT G-Codes  Outcome Measure Options: AM-PAC 6 Clicks Basic Mobility (PT)  AM-PAC 6 Clicks Score (PT): 20    Carmela Reyes, PT  9/23/2020

## 2020-09-23 NOTE — PROGRESS NOTES
Neurosurgery Progress Note      Patient: Gladys Garrison    YOB: 1960    Medical Record Number: [id number]    Attending Physician: Mark Kaba MD    Date of Admission: 9/22/2020  8:31 AM    Status Post: [place] TOT DISC ARTHRP ART DISC ANT APPRO 1 [facility] CRV [06318] (C4 VERTEBRECTOMY AND ANTERIOR CERIVCAL DISCECTOMY WITH FUSION WITH REMOVAL OF HARDWARE C5-C6)    Post Operative Day Number: 1    Admitting Dx: Cervical myelopathy (CMS/HCC) [G95.9]  Cervical myelopathy (CMS/HCC) [G95.9]    General Appearance:  60 y.o. female awake and alert lying in bed.  She is in no acute distress.  Patient in cervical collar in place.  She complains of some postoperative incisional pain.  She states she has been up ambulating going to the bathroom.  She states that first she was a little unsteady on her feet but since she has recovered more from anesthesia she states that she feels that she is very stable on her feet.  She states that her numbness and tingling has significantly better and almost resolved in her bilateral hands.  Patient states she is swallowing with no difficulty and ate mashed potatoes for dinner last night.    Current Problem List:   Patient Active Problem List   Diagnosis   • Chest pain   • Hyperlipidemia   • Hypertensive disorder   • Osteoarthritis of multiple joints   • Pulmonary hypertension (CMS/HCC)   • Pulmonary valve disorder   • Malignant tumor of breast (CMS/HCC)   • Tetralogy of Fallot   • Tricuspid valve regurgitation   • Controlled type 2 diabetes mellitus without complication, with long-term current use of insulin (CMS/HCC)   • Vitamin D deficiency   • Acquired spondylolisthesis of cervical vertebra   • Adjacent segment disease with spinal stenosis   • Cervical spondylosis with myelopathy   • Cervical myelopathy (CMS/HCC)           Current Medications:  Scheduled Meds:cyclobenzaprine, 10 mg, Oral, Nightly  insulin NPH-insulin regular, 30 Units, Subcutaneous, Daily Before  Supper  insulin NPH-insulin regular, 40 Units, Subcutaneous, QAM  lisinopril, 20 mg, Oral, Daily  rosuvastatin, 10 mg, Oral, Nightly  sodium chloride, 3 mL, Intravenous, Q12H      Continuous Infusions:sodium chloride 0.45 % with KCl 20 mEq, 100 mL/hr, Last Rate: 100 mL/hr (09/23/20 0426)      PRN Meds:.HYDROcodone-acetaminophen  •  HYDROmorphone **AND** naloxone  •  ondansetron  •  sodium chloride      Diagnostic Tests:    Lab Results (last 24 hours)     Procedure Component Value Units Date/Time    POC Glucose Once [[id number]]  (Abnormal) Collected: 09/23/20 0714    Specimen: Blood Updated: 09/23/20 0715     Glucose 179 mg/dL      Comment: Serial Number: [id number]:  [id number]       POC Glucose Once [[id number]]  (Abnormal) Collected: 09/22/20 1520    Specimen: Blood Updated: 09/22/20 1521     Glucose 138 mg/dL      Comment: Serial Number: [id number]:  [id number]       POC Glucose Once [[id number]]  (Abnormal) Collected: 09/22/20 1021    Specimen: Blood Updated: 09/22/20 1022     Glucose 114 mg/dL      Comment: Serial Number: [id number]:  [id number]             Imaging Results (Last 24 Hours)     Procedure Component Value Units Date/Time    XR Spine Cervical 2 or 3 View [[id number]] Collected: 09/22/20 2138     Updated: 09/22/20 2340    Narrative:      EXAM: Cervical spine radiographs    DATE: September 22, 2020    HISTORY: Postoperative study    COMPARISON: Same day    TECHNIQUE: 4 radiographs are obtained of the cervical spine    FINDINGS:    There is anterior fixation hardware extending from C3-C6. The C7 vertebral body is not well-visualized on this study. A surgical drain is identified. There is degenerative facet arthropathy, bilaterally. The dens and lateral masses are not well   characterized on this study.      Impression:      1. Anterior fixation hardware extending from C3-C6.        Slot 63    Electronically signed by:  Alexei Yuen M.D.    9/22/2020 9:39 PM    XR Spine Cervical 1 View  [[id number]] Collected: 09/22/20 1512     Updated: 09/22/20 1526    Narrative:      DATE OF EXAM:  9/22/2020 2:20 PM     PROCEDURE:  XR SPINE CERVICAL 1 VW-     INDICATIONS:  C4 VERTEBRECTOMY AND ANTERIOR CERIVCAL DISCECTOMY WITH FUSION OF  CERVICAL THREE THROUGH FIVE WITH REMOVAL OF HARDWARE C5-C6;  G95.9-Disease of spinal cord, unspecified     COMPARISON:  No Comparisons Available     TECHNIQUE:   A single radiologic view of the cervical spine was obtained.     FINDINGS:  Single intraoperative radiograph of the cervical spine is performed. The  patient has apparently had an anterior cortical sideplate fusion. The  uppermost level appears to be at C3. The fusion appears to pass through  C6. There are anterior interbody fusions at C3-4 and C4-5.        Impression:      Intraoperative imaging during cervical fusion. Details of the procedure  can be found in the referring surgeon's notes     Electronically Signed By-Christ Dowling On:9/22/2020 3:13 PM  This report was finalized on [id number] by  Christ Dowling, .          Physical Exam:   General Appearance:    Alert, cooperative, in no acute distress   Neck:   Cervical collar in place.  Drain in place.  No obvious swelling or deformity noted.  Trachea is midline.  Incision is well approximated.   Abdomen:    non-tender, non-distended   Extremities/MSK:   Moves all extremities well, no edema, no cyanosis, no             Redness no sign of DVT   Skin:   No bleeding, bruising or rash   Neurologic:   Cranial nerves 2 - 12 grossly intact.  Alert and oriented x3         Vitals:    09/22/20 1640 09/23/20 0008 09/23/20 0550 09/23/20 0752   BP: 154/89 156/87 145/75 133/84   BP Location: Right arm Right arm Right arm Right arm   Patient Position: Lying Lying Lying Lying   Pulse: 78 86 79 78   Resp: 15 14 14 11   Temp: 97.8 °F (36.6 °C) 98 °F (36.7 °C) 97.7 °F (36.5 °C) 97.4 °F (36.3 °C)   TempSrc: Oral Oral Oral Oral   SpO2: 90% 97% 95% 95%   Weight: 75.8 kg (167 lb 1.7 oz)  78.5  "kg (173 lb)    Height: 154.9 cm (61\")            Assessment: Patient is doing very well postop day 1.  Her pain is controlled.  She is able to take in good p.o. with no nausea and vomiting or swallowing difficulties.  She is urinating with no issues and ambulating with minimal assist.  Patient has a relatively short neck spanse.  We will change her Aspen collar out for soft collar that she is to wear anytime she is at 45 degrees with a 15 minutes.  425 ml documented out drain last 12 hours.  We will repeat H&H to assess hemoglobin, vital signs are stable.    Plan: DC drain  Repeat H&H  PT to evaluate for gait assessment.  Will await recommendations, but feel patient should be able to return home with outpatient physical therapy as long as hemoglobin is stable.    Continue Pain management efforts  Continue mobilization efforts  Continue incisional care  Continue DVT Prophylaxis    Discharge Plan: Today p.m.    Dr. Kaba was present on rounding and agrees with plan.  This patient was examined wearing appropriate personal protective equipment.       Date: 9/23/2020    CIRO Valadez  09:02 EDT      "

## 2020-09-23 NOTE — OUTREACH NOTE
Prep Survey      Responses   [facility] patient discharged from?  [facility]   Is LACE score < 7 ?  No   Eligibility  [facility]   Hospital  [facility]   Date of Admission  09/22/20   Date of Discharge  09/23/20   Discharge Disposition  Home or Self Care   Discharge diagnosis  s/p C4 VERTEBRECTOMY, CERIVCAL DISCECTOMY WITH FUSION    COVID-19 Test Status  Negative   Does the patient have one of the following disease processes/diagnoses(primary or secondary)?  General Surgery   Does the patient have Home health ordered?  No   Is there a DME ordered?  No   Prep survey completed?  Yes          Leyla Madrigal RN

## 2020-09-23 NOTE — PLAN OF CARE
Problem: Adult Inpatient Plan of Care  Goal: Plan of Care Review  Outcome: Ongoing, Progressing  Flowsheets (Taken 9/23/2020 4957)  Plan of Care Reviewed With:   patient   spouse  Outcome Summary:   59 y/o s/p C4 vertebrectomy , C3-5 fusion on 9/22 due to cervical myelopathy. Pt reporting of pain incision site and unable to tolerate Aspen collar. Pt has soft cervical collar which was too big despite modfication on chin rest; smaller soft collar issued. Pt able to perform supine <>sit transfer with verbal cues and extra time without physical assistance, did report of pain. Advised to sleep in recliner at home especially during day. Pt able to tolerate ambulation in room x 4 with HHA. Reported of lightheadedness but improved with time up. Educated on activity modification, restrictions. Pt should be safe to d/c home with assistance for household chores. PPE gloves, mask with shield.

## 2020-09-23 NOTE — PROGRESS NOTES
Discharge Planning Assessment   [facility]     Patient Name: Gladys Garrison  MRN: [id number]  Today's Date: 9/23/2020    Admit Date: 9/22/2020    Discharge Needs Assessment     Row Name 09/23/20 1241       Living Environment    Lives With  spouse    Current Living Arrangements  home/apartment/condo    Primary Care Provided by  self    Provides Primary Care For  no one    Family Caregiver if Needed  spouse    Quality of Family Relationships  helpful    Able to Return to Prior Arrangements  yes       Resource/Environmental Concerns    Resource/Environmental Concerns  none    Transportation Concerns  car, none       Transition Planning    Patient/Family Anticipates Transition to  home with family    Patient/Family Anticipated Services at Transition  none    Transportation Anticipated  family or friend will provide       Discharge Needs Assessment    Readmission Within the Last 30 Days  no previous admission in last 30 days    Equipment Currently Used at Home  glucometer    Concerns to be Addressed  denies needs/concerns at this time    Anticipated Changes Related to Illness  none    Equipment Needed After Discharge  none        Discharge Plan     Row Name 09/23/20 1242       Plan    Plan  D/C Plan: Anticipate home with family. PT recommendations pending.    Provided Post Acute Provider List?  Yes    Post Acute Provider List  Home Health    Delivered To  Patient    Method of Delivery  In person    Patient/Family in Agreement with Plan  yes    Plan Comments   spoke to patient at bedside wearing mask and goggles and keeping distance greater than 6 feet. Patient lives with spouse, is IADLs and spouse drives to appts. PCP and pharamcy verified-denies any difficulty affording meds. Denies any d/c needs at this time. CM discussed home health with patient and list left at bedside-patient requests to wait for PT recommendations before making decision. Barrier to D/C: pending PT recommendations, s/p cervical fusion  yesterday.        Expected Discharge Date and Time     Expected Discharge Date Expected Discharge Time    Sep 24, 2020         Demographic Summary     Row Name 09/23/20 1239       General Information    Admission Type  inpatient    Arrived From  operating room    Referral Source  admission list    Reason for Consult  discharge planning    Preferred Language  English     Used During This Interaction  no        Functional Status     Row Name 09/23/20 1241       Functional Status    Usual Activity Tolerance  good    Current Activity Tolerance  good       Functional Status, IADL    Medications  independent    Meal Preparation  independent    Housekeeping  independent    Laundry  independent    Shopping  independent       Mental Status    General Appearance WDL  WDL       Mental Status Summary    Recent Changes in Mental Status/Cognitive Functioning  no changes        Kaia Nguyen

## 2020-09-23 NOTE — DISCHARGE SUMMARY
Gladys Garrison  1960    Patient Care Team:  Kourtney Coffman APRN as PCP - General (Nurse Practitioner)    Date of Admit: 9/22/2020    Date of Discharge:  9/23/2020    Discharge Diagnosis:  Cervical myelopathy (CMS/HCC)      Procedures Performed  Procedure(s):  C4 VERTEBRECTOMY AND ANTERIOR CERIVCAL DISCECTOMY WITH FUSION OF CERVICAL THREE THROUGH FIVE WITH REMOVAL OF HARDWARE C5-C6       Complications: none    Consultants:   Consults     No orders found for last 30 day(s).          Condition on Discharge: stable    Discharge disposition: home    HPI: Gladys Garrison is a 60 y.o. female who presented with adjacent segment degenerative disease with myelopathy.    Hospital Course: Patient admitted for above procedure. The patient was transferred to SIPS/ICU following recovery. Pt has been up ambulating with minimal assist. She has been taking in good po and urinating with no issues. Her pain is well controlled and hemodynamically stable. Hemovac drain was removed POD 1 with no complications or bleeding. Post operative x rays demonstrate stable hardware. She reports no swallowing difficulties and her incision is well  Approximated with no swelling or edema noted. She is deemed safe to be discharged at this time.    Vitals:    09/23/20 1148   BP: 128/69   Pulse: 84   Resp: 13   Temp: 98 °F (36.7 °C)   SpO2: 95%         Lab Results (last 24 hours)     Procedure Component Value Units Date/Time    Hemoglobin & Hematocrit, Blood [[id number]]  (Normal) Collected: 09/23/20 1504    Specimen: Blood Updated: 09/23/20 1512     Hemoglobin 12.6 g/dL      Hematocrit 38.4 %     POC Glucose Once [[id number]]  (Abnormal) Collected: 09/23/20 1113    Specimen: Blood Updated: 09/23/20 1117     Glucose 197 mg/dL      Comment: Serial Number: [id number]:  [id number]       POC Glucose Once [[id number]]  (Abnormal) Collected: 09/23/20 0714    Specimen: Blood Updated: 09/23/20 0715     Glucose 179 mg/dL      Comment: Serial Number:  [id number]:  [id number]             Imaging Results (Last 24 Hours)     Procedure Component Value Units Date/Time    XR Spine Cervical 2 or 3 View [[id number]] Collected: 09/22/20 2138     Updated: 09/22/20 2340    Narrative:      EXAM: Cervical spine radiographs    DATE: September 22, 2020    HISTORY: Postoperative study    COMPARISON: Same day    TECHNIQUE: 4 radiographs are obtained of the cervical spine    FINDINGS:    There is anterior fixation hardware extending from C3-C6. The C7 vertebral body is not well-visualized on this study. A surgical drain is identified. There is degenerative facet arthropathy, bilaterally. The dens and lateral masses are not well   characterized on this study.      Impression:      1. Anterior fixation hardware extending from C3-C6.        Slot 63    Electronically signed by:  Alexei Yuen M.D.    9/22/2020 9:39 PM            Discharge Physical Exam:    General Appearance:    Pt is Alert, cooperative, in no acute distress   Neck:   Cervical collar in place.  No obvious swelling or deformity noted.  Trachea is midline.  Incision is well approximated.   Abdomen:    non-tender, non-distended   Extremities/MSK:   Moves all extremities well, no edema, no cyanosis, no             Redness no sign of DVT   Skin:   No bleeding, bruising or rash   Neurologic:   Cranial nerves 2-12 grossly intact.  Alert and oriented x3        Discharge Medications  Inspect has been reviewed and narcotic consent is on file in the patient's chart.     Your medication list      START taking these medications      Instructions Last Dose Given Next Dose Due   HYDROcodone-acetaminophen 7.5-325 MG per tablet  Commonly known as: NORCO  Replaces: HYDROcodone-acetaminophen 5-325 MG per tablet      Take 1-2 tablets by mouth Every 4 (Four) Hours As Needed for Moderate Pain  (Pain).          CHANGE how you take these medications      Instructions Last Dose Given Next Dose Due   aspirin 81 MG chewable tablet  What  "changed: additional instructions      Chew 1 tablet Daily.       insulin NPH-insulin regular (70-30) 100 UNIT/ML injection  Commonly known as: humuLIN 70/30,novoLIN 70/30  What changed: additional instructions      Inject 40 Units under the skin into the appropriate area as directed Every Morning.       insulin NPH-insulin regular (70-30) 100 UNIT/ML injection  Commonly known as: humuLIN 70/30,novoLIN 70/30  What changed: additional instructions      Inject 30 Units under the skin into the appropriate area as directed Every Night.       rosuvastatin 10 MG tablet  Commonly known as: CRESTOR  What changed:   · how much to take  · how to take this  · when to take this      Take one daily          CONTINUE taking these medications      Instructions Last Dose Given Next Dose Due   Accu-Chek Araceli Plus w/Device kit      Use as directed   DX  E11.9       Accu-Chek FastClix Lancets misc      For finger stick 4x/d       Alcohol Swabs 70 % pads      Use tid       cyclobenzaprine 10 MG tablet  Commonly known as: FLEXERIL      Take 1 tablet by mouth Every Night.       glucose blood test strip  Commonly known as: Accu-Chek Araceli Plus      To check blood sugar 4x/d       Insulin Syringe 31G X 5/16\" 1 ML misc      1 syringe 2 (Two) Times a Day.       lisinopril 20 MG tablet  Commonly known as: PRINIVILZESTRIL      Take 20 mg by mouth Daily. LD 9/20          STOP taking these medications    HYDROcodone-acetaminophen 5-325 MG per tablet  Commonly known as: NORCO  Replaced by: HYDROcodone-acetaminophen 7.5-325 MG per tablet              Where to Get Your Medications      These medications were sent to [facility] [address] [place] [address]    Hours: Mon-Fri 7:00AM-7:00PM Phone: [phone]   · HYDROcodone-acetaminophen 7.5-325 MG per tablet         Discharge Diet:   Diet Instructions     Diet:      Diet Texture / Consistency: Regular          Activity at Discharge:   Activity Instructions     " Discharge Activity      INSTRUCTION & CARE AFTER YOUR ANTERIOR CERVICAL FUSION    [facility]   [address]  [place]  [address]   P: [phone]  F: [phone]    Mark Kaba M.D., F.A.C.S     Wear your neck collar when you are up more than 45 degrees for more than 15 minutes, except when showering, shaving, eating or drinking, being careful not to bend your head forward, backward, or to the side while the collar is off. Wear your collar when you are riding in a vehicle.    No lifting anything heavier than gallon of milk    No driving for one week. We recommend that you limit riding in a car because of the risk of an accident. If you are a passenger, wear your seatbelt. In order to advance to driving, you will have to adjust your mirrors so that you are not turning your head and looking behind you.     You may bend over or reach over your head as long as you don't lift anything heavy.    Walk as much as possible.    Leave the incision open to air. If you notice any redness, swelling or drainage, call the office.     You may shower the evening of discharge. Don't let the water beat directly on the incision. Gently pat the incision with mild soap and water, pat dry.     Schedule a 2 week follow up appointment.    You have a prescription for pain medication that will last you through to your 2 week follow up appointment. This prescription refill cannot be called in to the pharmacy. You must see us for your two week follow up appointment for a refill.    You may have some difficulty with swallowing. This is normal and may persist for up to 6 months post operatively. We recommend a soft to chew diet to include thickened liquids, cold ice water and easy to chew food. Avoid fried foods and chips. If you are unable to swallow your own secretions, please call the office.    Don't be alarmed if you experience some of your pre-operative symptoms after going home. This is not  uncommon and normally goes away in a few days but may last longer. Pain, aching and stiffness in the neck, across the shoulders and between the shoulder blades are very common. If you have any questions or concerns don't hesitate to call the office.    Do NOT take any anti-inflammatory medications to include Ibuprofen, Aleve, Naproxen, Advil, Mobic, Celebrex    Constipation is common after surgery. Your bowels are slow due to anesthesia, opioid pain medications and lack of movement. We do not want you to strain to use the restroom. Use a mild stool softener or laxative as needed. Drink plenty of liquids to include water and juice.     Nausea is common with pain medications. To reduce this chance, do not take your medication on an empty stomach.          Call for: questions or concerns    Follow-up Appointments  Future Appointments   Date Time Provider Department Center   10/8/2020  9:00 AM Amy Vitale RD [facility] None   10/16/2020 12:00 PM Floresita Contreras APRN [facility]   10/19/2020 11:00 AM Ignacia Lundy MD [facility] None   2/8/2021 10:50 AM LAB  [facility]  [facility] None   2/15/2021 10:45 AM Lopez Rey MD [facility] END [facility] None     Follow-up Information     Kourtney Coffman APRN .    Specialties: Nurse Practitioner, Family Medicine  Contact information:  [address]  [address]  [place] [facility]  [phone]                     I discussed the discharge instructions with patient.    This patient was examined wearing appropriate personal protective equipment.     CIRO Valadez  09/23/20  16:07 EDT

## 2020-09-24 ENCOUNTER — TRANSITIONAL CARE MANAGEMENT TELEPHONE ENCOUNTER (OUTPATIENT)
Dept: CALL CENTER | Facility: HOSPITAL | Age: 60
End: 2020-09-24

## 2020-09-24 NOTE — OUTREACH NOTE
Call Center TCM Note      Responses   [facility] patient discharged from?  [facility]   Does the patient have one of the following disease processes/diagnoses(primary or secondary)?  General Surgery   TCM attempt successful?  Yes   Call end time  1454   Discharge diagnosis  s/p C4 VERTEBRECTOMY, CERIVCAL DISCECTOMY WITH FUSION    Meds reviewed with patient/caregiver?  Yes   Is the patient having any side effects they believe may be caused by any medication additions or changes?  No   Does the patient have all medications related to this admission filled (includes all antibiotics, pain medications, etc.)  Yes   Is the patient taking all medications as directed (includes completed medication regime)?  Yes   Does the patient have a follow up appointment scheduled with their surgeon?  Yes   Has the patient kept scheduled appointments due by today?  Yes   Comments  Post op with neurosurgeon 10/16/2020   Has home health visited the patient within 72 hours of discharge?  N/A   Did the patient receive a copy of their discharge instructions?  Yes   Nursing interventions  Reviewed instructions with patient   What is the patient's perception of their health status since discharge?  Improving   Is the patient /caregiver able to teach back basic post-op care?  Continue use of incentive spirometry at least 1 week post discharge, Take showers only when approved by MD-sponge bathe until then, Do not remove steri-strips, Lifting as instructed by MD in discharge instructions, No tub bath, swimming, or hot tub until instructed by MD, Practice 'cough and deep breath', Drive as instructed by MD in discharge instructions, Keep incision areas clean,dry and protected   Is the patient/caregiver able to teach back signs and symptoms of incisional infection?  Incisional warmth, Increased drainage or bleeding, Fever, Pus or odor from incision, Increased redness, swelling or pain at the incisonal site   Is the patient/caregiver able to teach  back steps to recovery at home?  Set small, achievable goals for return to baseline health, Practice good oral hygiene, Eat a well-balance diet, Rest and rebuild strength, gradually increase activity, Weigh daily, Make a list of questions for surgeon's appointment   Is the patient/caregiver able to teach back the hierarchy of who to call/visit for symptoms/problems? PCP, Specialist, Home health nurse, Urgent Care, ED, 911  Yes   TCM call completed?  Yes   Wrap up additional comments  Short call, pt states phone service spotty and connection was poor. Doing ok s/p cervixal fusion. Very sore  but incision site good. Pt is having some numbness and tingling in hand on the side where all IV's were in place, but no redness, swelling, fever. Pt will call neuro srgeon tomorrow if not improving. This is not pt first spine sx and understands all protocols.           Dedra Velez MA    9/24/2020, 14:57 EDT

## 2020-09-24 NOTE — PROGRESS NOTES
Case Management Discharge Note      Final Note: DC to home    Provided Post Acute Provider List?: Yes  Post Acute Provider List: Home Health  Delivered To: Patient  Method of Delivery: In person    Selected Continued Care - Discharged on 9/23/2020 Admission date: 9/22/2020 - Discharge disposition: Home or Self Care     Final Discharge Disposition Code: 01 - home or self-care

## 2020-09-28 ENCOUNTER — TELEPHONE (OUTPATIENT)
Dept: NEUROSURGERY | Facility: CLINIC | Age: 60
End: 2020-09-28

## 2020-09-28 NOTE — TELEPHONE ENCOUNTER
PT CALLED AND IS POST OP FROM 09/22/2020/STATES SHE IS HAVING TINGLING IN ONE HAND AND A STABBING PAIN IN THE OTHER/CALLED OFFICE, S/W TONE/ADVISED TO TELL THE PATIENT TO BE EVALUATED @ THE EMERGENCY ROOM/INFORMED THE PATIENT, STATED SHE WILL GO TO THE EMERGENCY ROOM IF SYMPTOMS BECOME WORSE AND HUNG UP.    THANK YOU!

## 2020-10-02 ENCOUNTER — READMISSION MANAGEMENT (OUTPATIENT)
Dept: CALL CENTER | Facility: HOSPITAL | Age: 60
End: 2020-10-02

## 2020-10-02 NOTE — OUTREACH NOTE
General Surgery Week 2 Survey      Responses   [facility] patient discharged from?  [facility]   Does the patient have one of the following disease processes/diagnoses(primary or secondary)?  General Surgery   Week 2 attempt successful?  Yes   Call start time  1639   Call end time  1643   General alerts for this patient  Patients phone will not ring so please call Lavon's number   Discharge diagnosis  s/p C4 VERTEBRECTOMY, CERIVCAL DISCECTOMY WITH FUSION    Person spoke with today (if not patient) and relationship  Lavon-spouse and patient    Meds reviewed with patient/caregiver?  Yes   Is the patient taking all medications as directed (includes completed medication regime)?  Yes   Has the patient kept scheduled appointments due by today?  N/A   Psychosocial issues?  No   What is the patient's perception of their health status since discharge?  Improving [Pt reports her right hand still tingles however it's improved and the thumb on her left hand is numb but feels like a needle is being stuck in it. She was advised to go ED however she reports she's waiting till Monday to see how it is.]   Nursing interventions  Nurse provided patient education   Is the patient /caregiver able to teach back basic post-op care?  Drive as instructed by MD in discharge instructions, Lifting as instructed by MD in discharge instructions   Is the patient/caregiver able to teach back signs and symptoms of incisional infection?  Fever   Is the patient/caregiver able to teach back steps to recovery at home?  Eat a well-balance diet   If the patient is a current smoker, are they able to teach back resources for cessation?  Not a smoker   Week 2 call completed?  Yes          Kristin Lopez RN

## 2020-10-13 ENCOUNTER — READMISSION MANAGEMENT (OUTPATIENT)
Dept: CALL CENTER | Facility: HOSPITAL | Age: 60
End: 2020-10-13

## 2020-10-13 NOTE — OUTREACH NOTE
General Surgery Week 3 Survey      Responses   [facility] patient discharged from?  [facility]   Does the patient have one of the following disease processes/diagnoses(primary or secondary)?  General Surgery   Week 3 attempt successful?  Yes   Call start time  1427   Call end time  1435   Discharge diagnosis  s/p C4 VERTEBRECTOMY, CERIVCAL DISCECTOMY WITH FUSION    Meds reviewed with patient/caregiver?  Yes   Is the patient having any side effects they believe may be caused by any medication additions or changes?  No   Does the patient have all medications related to this admission filled (includes all antibiotics, pain medications, etc.)  Yes   Is the patient taking all medications as directed (includes completed medication regime)?  Yes   Does the patient have a follow up appointment scheduled with their surgeon?  Yes   Has the patient kept scheduled appointments due by today?  Yes   Comments  Patient will see PA on 10/16/2020   Psychosocial issues?  No   Did the patient receive a copy of their discharge instructions?  Yes   What is the patient's perception of their health status since discharge?  Improving   Is the patient/caregiver able to teach back the hierarchy of who to call/visit for symptoms/problems? PCP, Specialist, Home health nurse, Urgent Care, ED, 911  Yes   Additional teach back comments  Patient states she dropped a cup today due to numbness and tingling of hand./arm   Week 3 call completed?  Yes   Wrap up additional comments  Patient states she is having some financial issues and has been redirected from family finances back to increased numbness and tingling with increased pain.  She states she does not want to visit ED due to copay.  She was encouraged to call surgeon's office today for further instruction.           Belinda Chowdhury RN

## 2020-10-16 ENCOUNTER — OFFICE VISIT (OUTPATIENT)
Dept: ORTHOPEDIC SURGERY | Facility: CLINIC | Age: 60
End: 2020-10-16

## 2020-10-16 VITALS
DIASTOLIC BLOOD PRESSURE: 89 MMHG | HEIGHT: 61 IN | WEIGHT: 162 LBS | HEART RATE: 92 BPM | SYSTOLIC BLOOD PRESSURE: 158 MMHG | BODY MASS INDEX: 30.58 KG/M2

## 2020-10-16 DIAGNOSIS — Z98.1 S/P CERVICAL SPINAL FUSION: Primary | ICD-10-CM

## 2020-10-16 DIAGNOSIS — M54.12 CERVICAL MYELOPATHY WITH CERVICAL RADICULOPATHY (HCC): Primary | ICD-10-CM

## 2020-10-16 DIAGNOSIS — G95.9 CERVICAL MYELOPATHY WITH CERVICAL RADICULOPATHY (HCC): Primary | ICD-10-CM

## 2020-10-16 PROCEDURE — 99024 POSTOP FOLLOW-UP VISIT: CPT | Performed by: PHYSICIAN ASSISTANT

## 2020-10-16 RX ORDER — HYDROCODONE BITARTRATE AND ACETAMINOPHEN 5; 325 MG/1; MG/1
1 TABLET ORAL EVERY 6 HOURS PRN
Qty: 28 TABLET | Refills: 0 | Status: SHIPPED | OUTPATIENT
Start: 2020-10-16 | End: 2020-10-16 | Stop reason: SDUPTHER

## 2020-10-16 RX ORDER — HYDROCODONE BITARTRATE AND ACETAMINOPHEN 5; 325 MG/1; MG/1
1 TABLET ORAL EVERY 6 HOURS PRN
Qty: 28 TABLET | Refills: 0 | Status: SHIPPED | OUTPATIENT
Start: 2020-10-16 | End: 2021-02-04

## 2020-10-16 RX ORDER — HYDROCODONE BITARTRATE AND ACETAMINOPHEN 5; 325 MG/1; MG/1
1 TABLET ORAL EVERY 6 HOURS PRN
Qty: 28 TABLET | Refills: 0 | Status: SHIPPED | OUTPATIENT
Start: 2020-10-16 | End: 2020-10-16 | Stop reason: ALTCHOICE
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
## 2020-10-19 ENCOUNTER — OFFICE VISIT (OUTPATIENT)
Dept: FAMILY MEDICINE CLINIC | Facility: CLINIC | Age: 60
End: 2020-10-19

## 2020-10-19 VITALS
HEIGHT: 61 IN | TEMPERATURE: 96.9 F | SYSTOLIC BLOOD PRESSURE: 174 MMHG | BODY MASS INDEX: 30.96 KG/M2 | WEIGHT: 164 LBS | DIASTOLIC BLOOD PRESSURE: 80 MMHG | HEART RATE: 94 BPM | RESPIRATION RATE: 16 BRPM | OXYGEN SATURATION: 95 %

## 2020-10-19 DIAGNOSIS — Z79.4 TYPE 2 DIABETES MELLITUS WITH HYPERGLYCEMIA, WITH LONG-TERM CURRENT USE OF INSULIN (HCC): Primary | ICD-10-CM

## 2020-10-19 DIAGNOSIS — R41.3 MEMORY LOSS: ICD-10-CM

## 2020-10-19 DIAGNOSIS — E11.65 TYPE 2 DIABETES MELLITUS WITH HYPERGLYCEMIA, WITH LONG-TERM CURRENT USE OF INSULIN (HCC): Primary | ICD-10-CM

## 2020-10-19 DIAGNOSIS — G95.9 CERVICAL MYELOPATHY WITH CERVICAL RADICULOPATHY (HCC): ICD-10-CM

## 2020-10-19 DIAGNOSIS — I10 ESSENTIAL HYPERTENSION: ICD-10-CM

## 2020-10-19 DIAGNOSIS — M54.12 CERVICAL MYELOPATHY WITH CERVICAL RADICULOPATHY (HCC): ICD-10-CM

## 2020-10-19 DIAGNOSIS — R41.82 ALTERED MENTAL STATUS, UNSPECIFIED ALTERED MENTAL STATUS TYPE: ICD-10-CM

## 2020-10-19 DIAGNOSIS — N95.1 POSTMENOPAUSAL DISORDER: ICD-10-CM

## 2020-10-19 DIAGNOSIS — Z85.3 HISTORY OF BREAST CANCER: ICD-10-CM

## 2020-10-19 PROCEDURE — 99214 OFFICE O/P EST MOD 30 MIN: CPT | Performed by: INTERNAL MEDICINE

## 2020-10-19 RX ORDER — FLASH GLUCOSE SENSOR
1 KIT MISCELLANEOUS
Qty: 6 EACH | Refills: 1 | Status: SHIPPED | OUTPATIENT
Start: 2020-10-19 | End: 2021-09-01

## 2020-10-19 RX ORDER — CYCLOBENZAPRINE HCL 10 MG
TABLET ORAL
Qty: 30 TABLET | Refills: 0 | Status: SHIPPED | OUTPATIENT
Start: 2020-10-19 | End: 2020-12-02

## 2020-10-19 RX ORDER — GABAPENTIN 100 MG/1
100 CAPSULE ORAL 3 TIMES DAILY
Qty: 180 CAPSULE | Refills: 0 | Status: ON HOLD | OUTPATIENT
Start: 2020-10-19 | End: 2020-10-23

## 2020-10-19 RX ORDER — VELPATASVIR AND SOFOSBUVIR 100; 400 MG/1; MG/1
1 TABLET, FILM COATED ORAL DAILY
COMMUNITY
Start: 2020-09-28 | End: 2021-02-04

## 2020-10-20 ENCOUNTER — TELEPHONE (OUTPATIENT)
Dept: FAMILY MEDICINE CLINIC | Facility: CLINIC | Age: 60
End: 2020-10-20

## 2020-10-20 NOTE — TELEPHONE ENCOUNTER
Yes, that is correct. [place], so i'm not sure that we have them.  Hollie, could you give the patient a call to see where we can get records?

## 2020-10-20 NOTE — TELEPHONE ENCOUNTER
Dr. Lundy or Hollie,  The referral to Onocology is the Diagnosis correct History of Breast Cancer.  The [facility] called and they are wanting records regarding the history of the breast cancer and I can't find any either so was just making sure this is the right diagnosis.        Please advise,     Thanks, Vaishali

## 2020-10-21 ENCOUNTER — LAB (OUTPATIENT)
Dept: LAB | Facility: HOSPITAL | Age: 60
End: 2020-10-21

## 2020-10-21 ENCOUNTER — READMISSION MANAGEMENT (OUTPATIENT)
Dept: CALL CENTER | Facility: HOSPITAL | Age: 60
End: 2020-10-21

## 2020-10-21 PROCEDURE — U0004 COV-19 TEST NON-CDC HGH THRU: HCPCS

## 2020-10-21 PROCEDURE — C9803 HOPD COVID-19 SPEC COLLECT: HCPCS

## 2020-10-21 NOTE — OUTREACH NOTE
General Surgery Week 4 Survey      Responses   [facility] [place], operated by [facility] patient discharged from?  [facility]   Does the patient have one of the following disease processes/diagnoses(primary or secondary)?  General Surgery   Week 4 attempt successful?  Yes   Call start time  1450   Revoke  Decline to participate [ answered and then hung up on me]   Call end time  1452          Dedra Harman RN

## 2020-10-22 PROBLEM — K76.6 PORTAL HYPERTENSION: Status: ACTIVE | Noted: 2020-10-22

## 2020-10-22 PROBLEM — Z12.11 SCREENING FOR COLON CANCER: Status: ACTIVE | Noted: 2020-10-22

## 2020-10-22 LAB — SARS-COV-2 RNA RESP QL NAA+PROBE: NOT DETECTED

## 2020-10-22 RX ORDER — MAGNESIUM CARB/ALUMINUM HYDROX 105-160MG
296 TABLET,CHEWABLE ORAL ONCE
Status: DISCONTINUED | OUTPATIENT
Start: 2020-10-22 | End: 2020-10-23 | Stop reason: HOSPADM

## 2020-10-22 NOTE — H&P
GI CONSULT  NOTE:    Referring Provider:    Kourtney Coffman APRN  [unfilled]    Chief complaint: Portal hypertension (CMS/HCC)    History of present illness:      Gladys Garrison is a 60 y.o. female who presents today for Procedure(s):  ESOPHAGOGASTRODUODENOSCOPY  COLONOSCOPY for the indications listed below.     The updated Patient Profile was reviewed prior to the procedure, in conjunction with the Physical Exam, including medical conditions, surgical procedures, medications, allergies, family history and social history.     Pre-operatively, I reviewed the indication(s) for the procedure, the risks of the procedure [including but not limited to: unexpected bleeding possibly requiring hospitalization and/or unplanned repeat procedures, perforation possibly requiring surgical treatment, missed lesions and complications of sedation/MAC (also explained by anesthesia staff)].     I have evaluated the patient for risks associated with the planned anesthesia and the procedure to be performed and find the patient an acceptable candidate for IV sedation.    Multiple opportunities were provided for any questions or concerns, and all questions were answered satisfactorily before any anesthesia was administered. We will proceed with the planned procedure.    Past Medical History:  Past Medical History:   Diagnosis Date   • Breast cancer (CMS/HCC) 2017    mets to lymph nodes; did not do radiation   • Cancer of unknown origin (CMS/HCC)    • Compression fx, thoracic spine, open, initial encounter (CMS/HCC)    • Coronary artery disease    • Diabetes mellitus (CMS/HCC)    • Heart disease, unspecified    • Hepatitis C    • Hypertension    • Sleep apnea     no machine   • Type 2 diabetes mellitus (CMS/HCC) 11/2017       Past Surgical History:  Past Surgical History:   Procedure Laterality Date   • BACK SURGERY      neck   • BREAST RECONSTRUCTION Bilateral    • CARDIAC ABLATION       atrial tachycardia x 5 ablations    •  CARDIAC CATHETERIZATION     • CARDIAC SURGERY      stent placed in aorta   • CARDIAC SURGERY      6 surgeries as baby    • CERVICAL FUSION ANTERIOR WITH ARTIFICIAL DISCECTOMY IMPLANTATION N/A 2020    Procedure: C4 VERTEBRECTOMY AND ANTERIOR CERIVCAL DISCECTOMY WITH FUSION OF CERVICAL THREE THROUGH FIVE WITH REMOVAL OF HARDWARE C5-C6;  Surgeon: Mark Kaba MD;  Location: [facility] MAIN OR;  Service: Neurosurgery;  Laterality: N/A;   •  SECTION      x2   • KNEE SURGERY     • MASTECTOMY Bilateral    • NECK SURGERY     • PACEMAKER IMPLANTATION         Social History:  Social History     Tobacco Use   • Smoking status: Never Smoker   • Smokeless tobacco: Never Used   Substance Use Topics   • Alcohol use: Yes     Alcohol/week: 1.0 standard drinks     Types: 1 Glasses of wine per week     Frequency: Monthly or less     Comment: rare   • Drug use: Not Currently       Family History:  Family History   Problem Relation Age of Onset   • Lymphoma Mother    • Heart disease Mother    • Stroke Mother    • Cancer Mother    • Other Father    • Diabetes Sister    • Thyroid disease Sister    • No Known Problems Brother    • No Known Problems Brother    • Diabetes type I Half-Sister    • Thyroid cancer Half-Sister    • Cancer Maternal Aunt        Medications:  No medications prior to admission.       Scheduled Meds:  Continuous Infusions:No current facility-administered medications for this encounter.     PRN Meds:.    ALLERGIES:  Promethazine and Tape    ROS:  The following systems were reviewed and negative;   Constitution:  No fevers, chills, no unintentional weight loss  Skin: no rash, no jaundice  Eyes:  No blurry vision, no eye pain  HENT:  No change in hearing or smell  Resp:  No dyspnea or cough  CV:  No chest pain or palpitations  :  No dysuria, hematuria  Musculoskeletal:  No leg cramps or arthralgias  Neuro:  No tremor, no numbness  Psych:  No depression or confsuion    Objective     Vital Signs:  "  Vitals:    10/20/20 0907   Weight: 72.6 kg (160 lb)   Height: 154.9 cm (61\")       Physical Exam:       General Appearance:    Awake and alert, in no acute distress   Head:    Normocephalic, without obvious abnormality, atraumatic   Throat:   No oral lesions, no thrush, oral mucosa moist   Lungs:     respirations regular, even and unlabored   Skin:   No rash, no jaundice       Results Review:  Lab Results (last 24 hours)     ** No results found for the last 24 hours. **          Imaging Results (Last 24 Hours)     ** No results found for the last 24 hours. **           I reviewed the patient's labs and imaging.    ASSESSMENT AND PLAN:      Principal Problem:    Portal hypertension (CMS/HCC)  Active Problems:    Screening for colon cancer       Procedure(s):  ESOPHAGOGASTRODUODENOSCOPY  COLONOSCOPY      I discussed the patients findings and my recommendations with the patient.    Stanley Bourne MD  10/22/20  17:38 EDT            "

## 2020-10-23 ENCOUNTER — HOSPITAL ENCOUNTER (OUTPATIENT)
Facility: HOSPITAL | Age: 60
Setting detail: HOSPITAL OUTPATIENT SURGERY
Discharge: HOME | End: 2020-10-23
Attending: INTERNAL MEDICINE | Admitting: INTERNAL MEDICINE

## 2020-10-23 ENCOUNTER — ANESTHESIA EVENT (OUTPATIENT)
Dept: GASTROENTEROLOGY | Facility: HOSPITAL | Age: 60
End: 2020-10-23

## 2020-10-23 ENCOUNTER — ON CAMPUS - OUTPATIENT (OUTPATIENT)
Dept: URBAN - METROPOLITAN AREA HOSPITAL 85 | Facility: HOSPITAL | Age: 60
End: 2020-10-23
Payer: MEDICARE

## 2020-10-23 ENCOUNTER — ANESTHESIA (OUTPATIENT)
Dept: GASTROENTEROLOGY | Facility: HOSPITAL | Age: 60
End: 2020-10-23

## 2020-10-23 VITALS
HEIGHT: 61 IN | SYSTOLIC BLOOD PRESSURE: 148 MMHG | RESPIRATION RATE: 17 BRPM | BODY MASS INDEX: 30.21 KG/M2 | OXYGEN SATURATION: 95 % | HEART RATE: 81 BPM | DIASTOLIC BLOOD PRESSURE: 90 MMHG | WEIGHT: 160 LBS

## 2020-10-23 DIAGNOSIS — K20.80 OTHER ESOPHAGITIS WITHOUT BLEEDING: ICD-10-CM

## 2020-10-23 DIAGNOSIS — K44.9 DIAPHRAGMATIC HERNIA WITHOUT OBSTRUCTION OR GANGRENE: ICD-10-CM

## 2020-10-23 DIAGNOSIS — Z12.11 ENCOUNTER FOR SCREENING FOR MALIGNANT NEOPLASM OF COLON: ICD-10-CM

## 2020-10-23 DIAGNOSIS — D12.2 BENIGN NEOPLASM OF ASCENDING COLON: ICD-10-CM

## 2020-10-23 DIAGNOSIS — B18.2 CHRONIC VIRAL HEPATITIS C: ICD-10-CM

## 2020-10-23 DIAGNOSIS — B18.2 CHRONIC HEPATITIS C (HCC): ICD-10-CM

## 2020-10-23 DIAGNOSIS — K64.0 FIRST DEGREE HEMORRHOIDS: ICD-10-CM

## 2020-10-23 DIAGNOSIS — D12.0 BENIGN NEOPLASM OF CECUM: ICD-10-CM

## 2020-10-23 DIAGNOSIS — Z12.11 SPECIAL SCREENING FOR MALIGNANT NEOPLASMS, COLON: ICD-10-CM

## 2020-10-23 DIAGNOSIS — K76.6 PORTAL HYPERTENSION (HCC): ICD-10-CM

## 2020-10-23 DIAGNOSIS — D12.5 BENIGN NEOPLASM OF SIGMOID COLON: ICD-10-CM

## 2020-10-23 DIAGNOSIS — K29.70 GASTRITIS, UNSPECIFIED, WITHOUT BLEEDING: ICD-10-CM

## 2020-10-23 DIAGNOSIS — K21.00 GASTRO-ESOPHAGEAL REFLUX DISEASE WITH ESOPHAGITIS, WITHOUT B: ICD-10-CM

## 2020-10-23 DIAGNOSIS — Q24.9 CONGENITAL MALFORMATION OF HEART: ICD-10-CM

## 2020-10-23 LAB — GLUCOSE BLDC GLUCOMTR-MCNC: 269 MG/DL (ref 70–105)

## 2020-10-23 PROCEDURE — 43239 EGD BIOPSY SINGLE/MULTIPLE: CPT | Mod: 59 | Performed by: INTERNAL MEDICINE

## 2020-10-23 PROCEDURE — 25010000002 PROPOFOL 10 MG/ML EMULSION: Performed by: ANESTHESIOLOGY

## 2020-10-23 PROCEDURE — 88305 TISSUE EXAM BY PATHOLOGIST: CPT | Performed by: INTERNAL MEDICINE

## 2020-10-23 PROCEDURE — 82962 GLUCOSE BLOOD TEST: CPT

## 2020-10-23 PROCEDURE — 45385 COLONOSCOPY W/LESION REMOVAL: CPT | Mod: PT | Performed by: INTERNAL MEDICINE

## 2020-10-23 RX ORDER — SODIUM CHLORIDE 9 MG/ML
INJECTION, SOLUTION INTRAVENOUS CONTINUOUS PRN
Status: DISCONTINUED | OUTPATIENT
Start: 2020-10-23 | End: 2020-10-23 | Stop reason: SURG

## 2020-10-23 RX ORDER — ONDANSETRON 2 MG/ML
4 INJECTION INTRAMUSCULAR; INTRAVENOUS ONCE AS NEEDED
Status: DISCONTINUED | OUTPATIENT
Start: 2020-10-23 | End: 2020-10-23 | Stop reason: HOSPADM

## 2020-10-23 RX ORDER — PROPOFOL 10 MG/ML
VIAL (ML) INTRAVENOUS AS NEEDED
Status: DISCONTINUED | OUTPATIENT
Start: 2020-10-23 | End: 2020-10-23 | Stop reason: SURG

## 2020-10-23 RX ADMIN — PROPOFOL 30 MG: 10 INJECTION, EMULSION INTRAVENOUS at 09:55

## 2020-10-23 RX ADMIN — SODIUM CHLORIDE: 0.9 INJECTION, SOLUTION INTRAVENOUS at 09:32

## 2020-10-23 RX ADMIN — PROPOFOL 30 MG: 10 INJECTION, EMULSION INTRAVENOUS at 09:48

## 2020-10-23 RX ADMIN — PROPOFOL 100 MG: 10 INJECTION, EMULSION INTRAVENOUS at 09:35

## 2020-10-23 RX ADMIN — PROPOFOL 50 MG: 10 INJECTION, EMULSION INTRAVENOUS at 09:59

## 2020-10-23 RX ADMIN — PROPOFOL 30 MG: 10 INJECTION, EMULSION INTRAVENOUS at 09:51

## 2020-10-23 RX ADMIN — PROPOFOL 20 MG: 10 INJECTION, EMULSION INTRAVENOUS at 09:44

## 2020-10-23 RX ADMIN — PROPOFOL 60 MG: 10 INJECTION, EMULSION INTRAVENOUS at 09:39

## 2020-10-23 RX ADMIN — PROPOFOL 40 MG: 10 INJECTION, EMULSION INTRAVENOUS at 09:37

## 2020-10-23 NOTE — ADDENDUM NOTE
Addendum  created 10/23/20 1021 by Catrachito Mullins MD    Clinical Note Signed, Review and Sign - Ready for Procedure, Review and Sign - Signed

## 2020-10-23 NOTE — ANESTHESIA POSTPROCEDURE EVALUATION
Patient: Gladys Garrison    Procedure Summary     Date: 10/23/20 Room / Location: [facility] ENDOSCOPY 1 / [facility] ENDOSCOPY    Anesthesia Start: 0932 Anesthesia Stop: 1001    Procedures:       ESOPHAGOGASTRODUODENOSCOPY WITH BIOPSY X 1 AREA (N/A )      COLONOSCOPY WITH POLYPECTOMY X6 (N/A ) Diagnosis:       Chronic hepatitis C (CMS/HCC)      Portal hypertension (CMS/HCC)      Congenital malformation of heart      Special screening for malignant neoplasms, colon      (Chronic hepatitis C (CMS/HCC) [B18.2])      (Portal hypertension (CMS/HCC) [K76.6])      (Congenital malformation of heart [Q24.9])      (Special screening for malignant neoplasms, colon [Z12.11])    Surgeon: Stanley Bourne MD Provider: Catrachito Mullins MD    Anesthesia Type: MAC ASA Status: 3          Anesthesia Type: MAC    Vitals  Vitals Value Taken Time   /82 10/23/20 1010   Temp     Pulse 75 10/23/20 1014   Resp 24 10/23/20 1004   SpO2 97 % 10/23/20 1014   Vitals shown include unvalidated device data.        Post Anesthesia Care and Evaluation    Patient location during evaluation: PACU  Patient participation: complete - patient participated  Level of consciousness: awake  Pain scale: See nurse's notes for pain score.  Pain management: adequate  Airway patency: patent  Anesthetic complications: No anesthetic complications  PONV Status: none  Cardiovascular status: acceptable  Respiratory status: acceptable  Hydration status: acceptable    Comments: Patient seen and examined postoperatively; vital signs stable; SpO2 greater than or equal to 90%; cardiopulmonary status stable; nausea/vomiting adequately controlled; pain adequately controlled; no apparent anesthesia complications; patient discharged from anesthesia care when discharge criteria were met

## 2020-10-23 NOTE — OP NOTE
ESOPHAGOGASTRODUODENOSCOPY, COLONOSCOPY Procedure Report    Patient Name:  Gladys Garrison  YOB: 1960    Date of Surgery:  10/23/2020     Pre-Op Diagnosis:  Portal hypertension  Chronic hepatitis C cirrhosis  Screening for colon cancer       Post-Op Diagnosis  LA grade a erosive esophagitis  Small hiatal hernia  Chronic gastritis  Colon polyps  Grade 1 internal hemorrhoids      Procedure/CPT® Codes:  EGD with biopsy  Colonoscopy with snare polypectomy      Staff:  Surgeon(s):  Stanley Bourne MD      Anesthesia: Monitored Anesthesia Care    Implants:    Nothing was implanted during the procedure    Specimen:        See Below    Estimated blood loss: N/A     Complications:  None    Description of Procedure:  Informed consent was obtained for the procedure, including sedation.  Risks of perforation, hemorrhage, adverse drug reaction and aspiration were discussed.  The patient was brought into the endoscopy suite. Continuous cardiopulmonary monitoring was performed. The patient was placed in the left lateral decubitus position.  The bite block was inserted into the patient's mouth. After adequate sedation was attained, the Olympus gastroscope was inserted into the patient's mouth and advanced to the second portion of the duodenum without difficulty.  Circumferential examination was performed. A retroflex exam was performed in the patient's stomach.  On completion of the exam, the bowel was decompressed, the scope was removed from the patient, the patient tolerated the procedure well, there were no immediate post-operative complications.     Examination of the esophagus: No esophageal varices.  A small linear erosion was seen extending from the GE junction consistent with LA grade a erosive esophagitis.  Small hiatal hernia was noted extending from 38 to 40 cm.  Examination of the stomach: Erythema and congestion diffusely in the stomach antrum and body consistent with chronic gastritis.  Cold  forceps biopsies were obtained for histology.  Retroflexed view the cardia and fundus were negative with no evidence of gastric varices.  Examination of the duodenum: Normal mucosa in the bulb and second portion of duodenum.    Subsequently,  the Olympus colonoscope was inserted into the patient's rectum and advanced to the level of the cecum and terminal ileum without difficulty.  The bowel prep was excellent.  Circumferential examination of the patient's colon was performed on scope withdrawal.  The cecum, ascending colon, and hepatic flexure were examined twice.  The transverse colon, splenic flexure, descending, sigmoid colon and rectum were examined.  A retroflex exam was performed in the rectum.  The bowel was decompressed, the scope was withdrawn from the patient, and the patient tolerated the procedure well. There were no immediate post-operative complications.     Findings:   Terminal ileum: Normal mucosa in the distal 10 cm  Colon:   3, sessile, benign-appearing polyps in the cecum and ascending colon ranging in size from 5 to 8 mm removed with cold snare single piece polypectomy retrieved.  2 polyps in the sigmoid colon.  1 4 mm and sessile, and the other 15 mm and pedunculated both removed with hot snare single piece polypectomies and retrieved.  1, sessile, benign-appearing polyp in the sigmoid colon approximately 5 mm in size removed cold snare and retrieved.  Grade 1 internal hemorrhoids    Impression:  60-year-old female with chronic hepatitis C cirrhosis with EGD showing no varices but chronic gastritis and mild erosive esophagitis.  Screening colonoscopy showed 6 colon polyps removed with snare and hemorrhoids    Recommendations:  Follow-up in pathology  Repeat colonoscopy in 3 years  If H. pylori is positive we will treat twice daily PPI Pylera x14 days after Epclusa completed  Avoid NSAIDs  Recommend famotidine daily as needed pantoprazole interacts with Epclusa  Continue Epclusa    We appreciate  the referral    Stanley Bourne MD     Date: 10/23/2020  Time: 10:13 EDT

## 2020-10-26 LAB
LAB AP CASE REPORT: NORMAL
PATH REPORT.FINAL DX SPEC: NORMAL
PATH REPORT.GROSS SPEC: NORMAL
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
## 2020-11-03 ENCOUNTER — TELEPHONE (OUTPATIENT)
Dept: FAMILY MEDICINE CLINIC | Facility: CLINIC | Age: 60
End: 2020-11-03

## 2020-11-03 ENCOUNTER — APPOINTMENT (OUTPATIENT)
Dept: CT IMAGING | Facility: HOSPITAL | Age: 60
End: 2020-11-03

## 2020-11-03 ENCOUNTER — HOSPITAL ENCOUNTER (EMERGENCY)
Facility: HOSPITAL | Age: 60
Discharge: HOME | End: 2020-11-03
Admitting: EMERGENCY MEDICINE

## 2020-11-03 VITALS
HEIGHT: 61 IN | RESPIRATION RATE: 18 BRPM | SYSTOLIC BLOOD PRESSURE: 160 MMHG | DIASTOLIC BLOOD PRESSURE: 78 MMHG | HEART RATE: 66 BPM | BODY MASS INDEX: 30.58 KG/M2 | OXYGEN SATURATION: 100 % | TEMPERATURE: 98.2 F | WEIGHT: 162 LBS

## 2020-11-03 DIAGNOSIS — L03.221 CELLULITIS OF NECK: Primary | ICD-10-CM

## 2020-11-03 LAB
ANION GAP SERPL CALCULATED.3IONS-SCNC: 10 MMOL/L (ref 5–15)
BASOPHILS # BLD AUTO: 0 10*3/MM3 (ref 0–0.2)
BASOPHILS NFR BLD AUTO: 0.3 % (ref 0–1.5)
BUN SERPL-MCNC: 13 MG/DL (ref 6–20)
BUN/CREAT SERPL: 17.8 (ref 7–25)
CALCIUM SPEC-SCNC: 9.7 MG/DL (ref 8.6–10.5)
CHLORIDE SERPL-SCNC: 102 MMOL/L (ref 98–107)
CO2 SERPL-SCNC: 25 MMOL/L (ref 22–29)
CREAT SERPL-MCNC: 0.73 MG/DL (ref 0.57–1)
DEPRECATED RDW RBC AUTO: 37.6 FL (ref 37–54)
EOSINOPHIL # BLD AUTO: 0.1 10*3/MM3 (ref 0–0.4)
EOSINOPHIL NFR BLD AUTO: 0.7 % (ref 0.3–6.2)
ERYTHROCYTE [DISTWIDTH] IN BLOOD BY AUTOMATED COUNT: 12.7 % (ref 12.3–15.4)
GFR SERPL CREATININE-BSD FRML MDRD: 82 ML/MIN/1.73
GLUCOSE SERPL-MCNC: 288 MG/DL (ref 65–99)
HCT VFR BLD AUTO: 41.8 % (ref 34–46.6)
HGB BLD-MCNC: 13.6 G/DL (ref 12–15.9)
HOLD SPECIMEN: NORMAL
LYMPHOCYTES # BLD AUTO: 2 10*3/MM3 (ref 0.7–3.1)
LYMPHOCYTES NFR BLD AUTO: 24.5 % (ref 19.6–45.3)
MCH RBC QN AUTO: 27.4 PG (ref 26.6–33)
MCHC RBC AUTO-ENTMCNC: 32.6 G/DL (ref 31.5–35.7)
MCV RBC AUTO: 84.1 FL (ref 79–97)
MONOCYTES # BLD AUTO: 0.5 10*3/MM3 (ref 0.1–0.9)
MONOCYTES NFR BLD AUTO: 6.1 % (ref 5–12)
NEUTROPHILS NFR BLD AUTO: 5.6 10*3/MM3 (ref 1.7–7)
NEUTROPHILS NFR BLD AUTO: 68.4 % (ref 42.7–76)
NRBC BLD AUTO-RTO: 0 /100 WBC (ref 0–0.2)
PLATELET # BLD AUTO: 116 10*3/MM3 (ref 140–450)
PMV BLD AUTO: 8.3 FL (ref 6–12)
POTASSIUM SERPL-SCNC: 4.1 MMOL/L (ref 3.5–5.2)
RBC # BLD AUTO: 4.97 10*6/MM3 (ref 3.77–5.28)
SODIUM SERPL-SCNC: 137 MMOL/L (ref 136–145)
WBC # BLD AUTO: 8.2 10*3/MM3 (ref 3.4–10.8)
WHOLE BLOOD HOLD SPECIMEN: NORMAL

## 2020-11-03 PROCEDURE — 80048 BASIC METABOLIC PNL TOTAL CA: CPT | Performed by: PHYSICIAN ASSISTANT

## 2020-11-03 PROCEDURE — 0 IOPAMIDOL PER 1 ML: Performed by: PHYSICIAN ASSISTANT

## 2020-11-03 PROCEDURE — 85025 COMPLETE CBC W/AUTO DIFF WBC: CPT | Performed by: PHYSICIAN ASSISTANT

## 2020-11-03 PROCEDURE — 70491 CT SOFT TISSUE NECK W/DYE: CPT

## 2020-11-03 PROCEDURE — 99283 EMERGENCY DEPT VISIT LOW MDM: CPT

## 2020-11-03 RX ORDER — CEPHALEXIN 500 MG/1
500 CAPSULE ORAL 3 TIMES DAILY
Qty: 30 CAPSULE | Refills: 0 | Status: SHIPPED | OUTPATIENT
Start: 2020-11-03 | End: 2021-02-04

## 2020-11-03 RX ORDER — SODIUM CHLORIDE 0.9 % (FLUSH) 0.9 %
10 SYRINGE (ML) INJECTION AS NEEDED
Status: DISCONTINUED | OUTPATIENT
Start: 2020-11-03 | End: 2020-11-03 | Stop reason: HOSPADM

## 2020-11-03 RX ADMIN — Medication 10 ML: at 18:52

## 2020-11-03 RX ADMIN — SODIUM CHLORIDE 500 ML: 9 INJECTION, SOLUTION INTRAVENOUS at 17:38

## 2020-11-03 RX ADMIN — IOPAMIDOL 100 ML: 755 INJECTION, SOLUTION INTRAVENOUS at 18:10

## 2020-11-03 NOTE — ED PROVIDER NOTES
Subjective   Patient is a 60-year-old female who presents with complaints of drainage from her cervical fusion incision site and her mainly over the past several days.  Patient states she did have surgery on 9/23/2020 done by Dr. Kaba she does report some increased redness over the past 5 to 6 days and does report some intermittent purulent drainage.  She has any fever body aches or chills chest pain or shortness of breath.  Does report some intermittent achy pain in her neck but denies any currently states it is worse with certain movements.  She denies any radiation of pain from her neck she denies any paresthesias numbness weakness of her upper extremities.  Patient states she did call her primary care provider who advised her to call the surgeon but states she has been unable to get a hold of their office for further evaluation and management.  She denies any recent travel or known sick contacts.  He also denies any headache or visual disturbances          Review of Systems   Constitutional: Negative.    HENT: Negative.    Respiratory: Negative.    Cardiovascular: Negative.    Gastrointestinal: Negative for abdominal distention, abdominal pain, constipation, diarrhea, nausea and vomiting.   Genitourinary: Negative for difficulty urinating, dysuria, flank pain, frequency and hematuria.   Musculoskeletal: Positive for neck pain. Negative for back pain, gait problem and neck stiffness.   Skin: Positive for color change and wound. Negative for pallor and rash.   Neurological: Negative.        Past Medical History:   Diagnosis Date   • Breast cancer (CMS/HCC) 2017    mets to lymph nodes; did not do radiation   • Cancer of unknown origin (CMS/HCC)    • Compression fx, thoracic spine, open, initial encounter (CMS/HCC)    • Coronary artery disease    • Diabetes mellitus (CMS/HCC)    • Heart disease, unspecified    • Hepatitis C    • Hypertension    • Sleep apnea     no machine   • Type 2 diabetes mellitus (CMS/HCC)  2017       Allergies   Allergen Reactions   • Promethazine Mental Status Change   • Tape Other (See Comments)     .blisters         Past Surgical History:   Procedure Laterality Date   • BACK SURGERY      neck   • BREAST RECONSTRUCTION Bilateral    • CARDIAC ABLATION       atrial tachycardia x 5 ablations    • CARDIAC CATHETERIZATION     • CARDIAC SURGERY      stent placed in aorta   • CARDIAC SURGERY      6 surgeries as baby    • CERVICAL FUSION ANTERIOR WITH ARTIFICIAL DISCECTOMY IMPLANTATION N/A 2020    Procedure: C4 VERTEBRECTOMY AND ANTERIOR CERIVCAL DISCECTOMY WITH FUSION OF CERVICAL THREE THROUGH FIVE WITH REMOVAL OF HARDWARE C5-C6;  Surgeon: Mark Kaba MD;  Location: [facility] MAIN OR;  Service: Neurosurgery;  Laterality: N/A;   •  SECTION      x2   • COLONOSCOPY N/A 10/23/2020    Procedure: COLONOSCOPY WITH POLYPECTOMY X6;  Surgeon: Stanley Bourne MD;  Location: [facility] ENDOSCOPY;  Service: Gastroenterology;  Laterality: N/A;  POLYPS, INTERNAL HEMORRHOIDS   • ENDOSCOPY N/A 10/23/2020    Procedure: ESOPHAGOGASTRODUODENOSCOPY WITH BIOPSY X 1 AREA;  Surgeon: Stanley Bourne MD;  Location: [facility] ENDOSCOPY;  Service: Gastroenterology;  Laterality: N/A;  GASTRITIS, ESOPHAGITIS, HIATAL HERNIA   • KNEE SURGERY     • MASTECTOMY Bilateral    • NECK SURGERY     • PACEMAKER IMPLANTATION         Family History   Problem Relation Age of Onset   • Lymphoma Mother    • Heart disease Mother    • Stroke Mother    • Cancer Mother    • Other Father    • Diabetes Sister    • Thyroid disease Sister    • No Known Problems Brother    • No Known Problems Brother    • Diabetes type I Half-Sister    • Thyroid cancer Half-Sister    • Cancer Maternal Aunt        Social History     Socioeconomic History   • Marital status:      Spouse name: Not on file   • Number of children: 2   • Years of education: Not on file   • Highest education level: Not on file   Occupational History      Employer: [employer]   Tobacco Use   • Smoking status: Never Smoker   • Smokeless tobacco: Never Used   Substance and Sexual Activity   • Alcohol use: Yes     Alcohol/week: 1.0 standard drinks     Types: 1 Glasses of wine per week     Frequency: Monthly or less     Comment: rare   • Drug use: Not Currently   • Sexual activity: Defer   Social History Narrative        So in: Lives with brother in law and     Lives 1/2 time in [place]           Objective   Physical Exam  Vitals signs and nursing note reviewed.   Constitutional:       General: She is not in acute distress.     Appearance: She is well-developed. She is not ill-appearing or toxic-appearing.   HENT:      Head: Normocephalic and atraumatic.      Nose: Nose normal.      Mouth/Throat:      Mouth: Mucous membranes are moist.      Pharynx: Oropharynx is clear.   Eyes:      General: No scleral icterus.     Extraocular Movements: Extraocular movements intact.      Pupils: Pupils are equal, round, and reactive to light.   Neck:        Comments: Slight decreased range of motion with flexion extension and lateral rotation.   Cardiovascular:      Rate and Rhythm: Normal rate and regular rhythm.      Pulses: Normal pulses.      Heart sounds: Normal heart sounds. No murmur. No friction rub. No gallop.    Pulmonary:      Effort: Pulmonary effort is normal. No respiratory distress.      Breath sounds: Normal breath sounds. No stridor. No decreased breath sounds, wheezing, rhonchi or rales.   Chest:      Chest wall: No tenderness.   Skin:     General: Skin is warm.      Capillary Refill: Capillary refill takes less than 2 seconds.      Findings: No rash.   Neurological:      General: No focal deficit present.      Mental Status: She is alert and oriented to person, place, and time.      GCS: GCS eye subscore is 4. GCS verbal subscore is 5. GCS motor subscore is 6.      Cranial Nerves: Cranial nerves are intact.   Psychiatric:         Mood and  "Affect: Mood normal.         Behavior: Behavior normal. Behavior is cooperative.         Procedures           ED Course    /83   Pulse 72   Temp 98 °F (36.7 °C) (Oral)   Resp 20   Ht 154.9 cm (61\")   Wt 73.5 kg (162 lb)   SpO2 97%   BMI 30.61 kg/m²   Medications   sodium chloride 0.9 % flush 10 mL (10 mL Intravenous Given 11/3/20 1852)   sodium chloride 0.9 % bolus 500 mL (0 mL Intravenous Stopped 11/3/20 1851)   iopamidol (ISOVUE-370) 76 % injection 100 mL (100 mL Intravenous Given 11/3/20 1810)     Labs Reviewed   BASIC METABOLIC PANEL - Abnormal; Notable for the following components:       Result Value    Glucose 288 (*)     All other components within normal limits    Narrative:     GFR Normal >60  Chronic Kidney Disease <60  Kidney Failure <15     CBC WITH AUTO DIFFERENTIAL - Abnormal; Notable for the following components:    Platelets 116 (*)     All other components within normal limits   CBC AND DIFFERENTIAL    Narrative:     The following orders were created for panel order CBC & Differential.  Procedure                               Abnormality         Status                     ---------                               -----------         ------                     CBC Auto Differential[[id number]]        Abnormal            Final result                 Please view results for these tests on the individual orders.   EXTRA TUBES    Narrative:     The following orders were created for panel order Extra Tubes.  Procedure                               Abnormality         Status                     ---------                               -----------         ------                     Light Blue Top[[id number]]                                   Final result               Gold Top - SST[[id number]]                                   Final result                 Please view results for these tests on the individual orders.   LIGHT BLUE TOP   GOLD TOP - SST     Ct Soft Tissue Neck With Contrast    Result " Date: 11/3/2020   1.  Small amount of fat stranding within the simultaneous fat along the right side of the lower neck recent anterior cervical fusion.  No discrete fluid collection or abscess identified. 2.  Patient is status post anterior fusion from the C3 3 through C6 levels and prior bony fusion at C6/7 level.  Is been interval C4 corpectomy.  No definite hardware complication identified.  Electronically Signed ByJosé Noguera On:11/3/2020 6:41 PM This report was finalized on [id number] by  Marc Noguera, .                                           MDM  Number of Diagnoses or Management Options  Cellulitis of neck:   Diagnosis management comments: Chart Review:  Comorbidity: History of breast cancer, CAD, diabetes, hepatitis C, hypertension  Differentials: Abscess, cellulitis, seroma      ;this list is not all inclusive and does not constitute the entirety of considered causes  Labs: CBC shows WBC 8.2 hemoglobin 13.6 hematocrit 41.8 platelets 116.  CMP shows sodium 137 potassium 4.1 BUN 13 creatinine 0.73 glucose 288  Imaging: Was interpreted by physician and reviewed by myself:  Ct Soft Tissue Neck With Contrast  Result Date: 11/3/2020   1.  Small amount of fat stranding within the simultaneous fat along the right side of the lower neck recent anterior cervical fusion.  No discrete fluid collection or abscess identified. 2.  Patient is status post anterior fusion from the C3 3 through C6 levels and prior bony fusion at C6/7 level.  Is been interval C4 corpectomy.  No definite hardware complication identified.  Electronically Signed ByJosé Noguera On:11/3/2020 6:41 PM This report was finalized on [id number] by  Marc Noguera, .    Disposition/Treatment:  Appropriate PPE was worn during exam and throughout all encounters with the patient.  While in the ED IV was placed and labs are obtained patient was afebrile and appeared nontoxic.  Lab results were fairly unremarkable did have elevated glucose of  288 CMP unremarkable CBC showed a normal WBC of 8.2 there was some surrounding erythema and edema near the incision line CT soft tissue neck was ordered showed some fat stranding but no signs of abscess as described above.  No definite hardware complication identified as well.  Lab results and findings were discussed with the patient who voiced understanding of discharge along with signs and symptoms to return to the ED.  Patient will be started on Keflex and advised to follow-up with surgeon later this week for further evaluation and management.  Patient was stable throughout ED stay       Amount and/or Complexity of Data Reviewed  Clinical lab tests: reviewed  Tests in the radiology section of CPT®: reviewed        Final diagnoses:   Cellulitis of neck            Fanta Alonso PA  11/03/20 [id number]

## 2020-11-03 NOTE — DISCHARGE INSTRUCTIONS
Take antibiotic as prescribed.  Be sure to take full course.  Consider taking probiotic or eating yogurt daily while on antibiotic and up to 10 days after to replace the good bacteria in your gastrointestinal tract; this can reduce chances of developing a GI infection such as C difficile    Follow-up with your primary care provider in 3-5 days.  If you do not have a primary care provider call [phone] for help in finding one, or you may follow up with [facility] at [phone].    Follow-up with Dr. Ospina's office in the morning for further evaluation and management as needed.    Return to ED for any new or worsening symptoms including increased redness or swelling near your incision line, fever, chest pain, or shortness of breath

## 2020-11-03 NOTE — TELEPHONE ENCOUNTER
Pt had spin surgery and the wound popped open but was closing and she took a bath and after that puss and red now .. not warm to the touch she has tried to call the surgeons office and never get her call returned. She wants to know if she should be seen of if you can call her in something for it

## 2020-11-03 NOTE — ED NOTES
Pt reports to the ED today for a sx incision that has a reddened area that the pt reports pus draining since Sunday. Pt denies fever, N/V.      Pratik Earl, RN  11/03/20 [id number]

## 2020-11-03 NOTE — TELEPHONE ENCOUNTER
PATIENT HAS AN INCISION ON HER NEK. IT LOOKS RED AND SHE GOT SOME PUSS OUT OF IT THIS MORNING.  SHE WOULD LIKE TO KNOW IF SOME ANTIBIOTICS CAN BE CALLED IN.     PATIENT CALLBACK [phone]     PHARMACY CONFIRMED: [facility] - [place] - [id number] - [phone]  - [phone] FX  [phone]

## 2020-11-04 NOTE — ED NOTES
Pt A&Ox 4 with lungs CTA bases. Pt ambulatory at this time.   Discharge instruction given to pt with verbal understanding received.   Pt discharged with education, RX, and personal belongings.        Pratik Earl, RN  11/03/20 5076

## 2020-11-09 ENCOUNTER — TREATMENT (OUTPATIENT)
Dept: PHYSICAL THERAPY | Facility: CLINIC | Age: 60
End: 2020-11-09

## 2020-11-09 ENCOUNTER — APPOINTMENT (OUTPATIENT)
Dept: SPEECH THERAPY | Facility: HOSPITAL | Age: 60
End: 2020-11-09

## 2020-11-09 DIAGNOSIS — M47.12 CERVICAL SPONDYLOSIS WITH MYELOPATHY: Primary | ICD-10-CM

## 2020-11-09 DIAGNOSIS — M54.2 PAIN, NECK: ICD-10-CM

## 2020-11-09 PROCEDURE — 97535 SELF CARE MNGMENT TRAINING: CPT | Performed by: PHYSICAL THERAPIST

## 2020-11-09 PROCEDURE — 97163 PT EVAL HIGH COMPLEX 45 MIN: CPT | Performed by: PHYSICAL THERAPIST
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
## 2020-11-09 NOTE — PROGRESS NOTES
Hematology/Oncology Outpatient Consultation    Patient name: Gladys Garrison  : 1960  MRN: [id number]  Primary Care Physician: Provider, No Known  Referring Physician: Ignacia Lundy MD  Reason For Consult:     Chief Complaint   Patient presents with   • Appointment   • Breast Cancer     Consult       History of Present Illness:    This is a 60-year-old female who multiple comorbidities including congenital abnormalities such as hypoplastic kidney, tetralogy of Fallot, coarctation of the aorta and congenital VSD status post repair.  Patient developed syncopal episode and for that reason he had she had a CT scan of the chest which showed mass in the left breast.  She had diagnostic mammogram and ultrasound which showed a 2 cm spiculated mass in the left breast at the 1 o'clock position 6 cm from the nipple.  Biopsy of the left breast mass revealed invasive moderately differentiated carcinoma ER/SC positive and HER-2/bishop negative.  Patient also had an ultrasound of the axilla which was concerning for an abnormal left axillary lymph node with cortical thickening.  She apparently had an FNA of the left axillary nodule which was positive for malignancy.  On 2017 patient underwent left modified radical mastectomy, right prophylactic mastectomy and immediate breast reconstruction.  Developed left breast cancer back in 2017.  At that time patient underwent left mastectomy with sentinel lymph node biopsy.  She also underwent right prophylactic mastectomy.  We have had her on records suggest that patient did have multifocal disease with pT2 pN1 aM0.  The largest focus measured 3.5 cm.  2 of 11 lymph nodes were positive for metastatic disease some with extracapsular extension.  Notes that patient did receive Arimidex neoadjuvant E from 2017 to 2018 prior to her bilateral mastectomies.  Review of her note suggest that she developed cough which she attributed to anastrozole and patient was then  placed on tamoxifen.  She had MammaPrint testing which returned with low risk for relapse.  Her postop course was also complicated by left chest wall abscess which resulted in I&D and removal of the left tissue expander.  She was referred for radiation treatment boards ultimately declined.  Patient was then placed on tamoxifen in 2018 which she stopped after less than a month due to nausea and vomiting.  Patient in the interim also was diagnosed with chronic hepatitis C was seen by the hepatologist.  She was placed on tamoxifen 10 mg daily with the goal to increase to 20 mg for better tolerance.    Patient has relocated to [facility].  She has transition her care to us now.  She is currently not on any antiestrogen therapy.    Her bilateral mastectomy specimen I available for review    Past Medical History:   Diagnosis Date   • Allergic    • Breast cancer (CMS/HCC) 2017    mets to lymph nodes; did not do radiation   • Cancer of unknown origin (CMS/HCC)    • Compression fx, thoracic spine, open, initial encounter (CMS/HCC)    • Coronary artery disease    • Diabetes mellitus (CMS/HCC)    • Heart disease, unspecified    • Hepatitis C    • Hypertension    • Sleep apnea     no machine   • Type 2 diabetes mellitus (CMS/HCC) 2017       Past Surgical History:   Procedure Laterality Date   • BACK SURGERY      neck   • BREAST RECONSTRUCTION Bilateral    • CARDIAC ABLATION       atrial tachycardia x 5 ablations    • CARDIAC CATHETERIZATION     • CARDIAC SURGERY      stent placed in aorta   • CARDIAC SURGERY      6 surgeries as baby    • CERVICAL FUSION ANTERIOR WITH ARTIFICIAL DISCECTOMY IMPLANTATION N/A 2020    Procedure: C4 VERTEBRECTOMY AND ANTERIOR CERIVCAL DISCECTOMY WITH FUSION OF CERVICAL THREE THROUGH FIVE WITH REMOVAL OF HARDWARE C5-C6;  Surgeon: Mark Kaba MD;  Location: [facility] MAIN OR;  Service: Neurosurgery;  Laterality: N/A;   •  SECTION      x2   • COLONOSCOPY N/A 10/23/2020     Procedure: COLONOSCOPY WITH POLYPECTOMY X6;  Surgeon: Stanley Bourne MD;  Location: [facility] ENDOSCOPY;  Service: Gastroenterology;  Laterality: N/A;  POLYPS, INTERNAL HEMORRHOIDS   • ENDOSCOPY N/A 10/23/2020    Procedure: ESOPHAGOGASTRODUODENOSCOPY WITH BIOPSY X 1 AREA;  Surgeon: Stanley Bourne MD;  Location: [facility] ENDOSCOPY;  Service: Gastroenterology;  Laterality: N/A;  GASTRITIS, ESOPHAGITIS, HIATAL HERNIA   • KNEE SURGERY  2000   • MASTECTOMY Bilateral    • NECK SURGERY     • PACEMAKER IMPLANTATION           Current Outpatient Medications:   •  Accu-Chek FastClix Lancets misc, For finger stick 4x/d, Disp: 100 each, Rfl: 12  •  Alcohol Swabs 70 % pads, Use tid, Disp: 300 each, Rfl: 2  •  aspirin 81 MG chewable tablet, Chew 1 tablet Daily. (Patient taking differently: Chew 81 mg Daily. Hold DOS), Disp: 30 tablet, Rfl: 1  •  Blood Glucose Monitoring Suppl (ACCU-CHEK ARACELI PLUS) w/Device kit, Use as directed   DX  E11.9, Disp: 1 kit, Rfl: 0  •  cephalexin (KEFLEX) 500 MG capsule, Take 1 capsule by mouth 3 (Three) Times a Day., Disp: 30 capsule, Rfl: 0  •  Continuous Blood Gluc Sensor (FreeStyle Rajeev 14 Day Sensor) mis, 1 each Every 14 (Fourteen) Days., Disp: 6 each, Rfl: 1  •  cyclobenzaprine (FLEXERIL) 10 MG tablet, TAKE 1 TABLET BY MOUTH ONCE EVERY NIGHT, Disp: 30 tablet, Rfl: 0  •  Epclusa 400-100 MG tablet, Take 1 tablet by mouth Daily. At 6 pm   ( for total 12 weeks ), Disp: , Rfl:   •  glucose blood (Accu-Chek Araceli Plus) test strip, To check blood sugar 4x/d, Disp: 150 each, Rfl: 12  •  HYDROcodone-acetaminophen (NORCO) 5-325 MG per tablet, Take 1 tablet by mouth Every 6 (Six) Hours As Needed for Severe Pain ., Disp: 28 tablet, Rfl: 0  •  insulin NPH-insulin regular (humuLIN 70/30,novoLIN 70/30) (70-30) 100 UNIT/ML injection, Inject 40 Units under the skin into the appropriate area as directed Every Morning. (Patient taking differently: Inject 40 Units under the skin into the  "appropriate area as directed Every Morning. Hold DOS), Disp: , Rfl:   •  Insulin Syringe 31G X 5/16\" 1 ML misc, 1 syringe 2 (Two) Times a Day., Disp: 100 each, Rfl: 1  •  lisinopril (PRINIVIL,ZESTRIL) 20 MG tablet, Take 20 mg by mouth Daily., Disp: , Rfl:   •  rosuvastatin (CRESTOR) 10 MG tablet, Take one daily (Patient taking differently: Take 10 mg by mouth Every Night.), Disp: , Rfl:     Allergies   Allergen Reactions   • Promethazine Mental Status Change   • Tape Other (See Comments)     .blisters           There is no immunization history on file for this patient.    Family History   Problem Relation Age of Onset   • Lymphoma Mother    • Heart disease Mother    • Stroke Mother    • Cancer Mother    • Other Father    • Diabetes Sister    • Thyroid disease Sister    • No Known Problems Brother    • No Known Problems Brother    • Diabetes type I Half-Sister    • Thyroid cancer Half-Sister    • Cancer Maternal Aunt        Cancer-related family history includes Cancer in her maternal aunt and mother; Lymphoma in her mother; Thyroid cancer in her half-sister.    Social History     Tobacco Use   • Smoking status: Never Smoker   • Smokeless tobacco: Never Used   Substance Use Topics   • Alcohol use: Yes     Alcohol/week: 1.0 standard drinks     Types: 1 Glasses of wine per week     Frequency: Monthly or less     Comment: rare   • Drug use: Not Currently       ROS:    Review of Systems   Constitutional: Negative for chills and fever.   HENT: Negative for ear pain, mouth sores, nosebleeds and sore throat.    Eyes: Negative for photophobia and visual disturbance.   Respiratory: Negative for wheezing and stridor.    Cardiovascular: Negative for chest pain and palpitations.   Gastrointestinal: Negative for abdominal pain, diarrhea, nausea and vomiting.   Endocrine: Negative for cold intolerance and heat intolerance.   Genitourinary: Negative for dysuria and hematuria.   Musculoskeletal: Negative for joint swelling and neck " "stiffness.   Skin: Negative for color change and rash.   Neurological: Negative for seizures and syncope.   Hematological: Negative for adenopathy.        No obvious bleeding   Psychiatric/Behavioral: Negative for agitation, confusion and hallucinations.       Objective:    Vitals:    11/16/20 1432   BP: 160/95   Pulse: 90   Resp: 16   Temp: 97.7 °F (36.5 °C)   Weight: 74.2 kg (163 lb 9.6 oz)   Height: 154.9 cm (61\")   PainSc: 0-No pain     Body mass index is 30.91 kg/m².    ECOG  (0) Fully active, able to carry on all predisease performance without restriction    Physical Exam:  Physical Exam  Vitals signs and nursing note reviewed.   Constitutional:       General: She is not in acute distress.     Appearance: She is not diaphoretic.   HENT:      Head: Normocephalic and atraumatic.   Eyes:      General: No scleral icterus.        Right eye: No discharge.         Left eye: No discharge.      Conjunctiva/sclera: Conjunctivae normal.   Neck:      Musculoskeletal: Normal range of motion and neck supple.      Thyroid: No thyromegaly.   Cardiovascular:      Rate and Rhythm: Normal rate and regular rhythm.      Heart sounds: Normal heart sounds. No friction rub. No gallop.    Pulmonary:      Effort: Pulmonary effort is normal. No respiratory distress.      Breath sounds: No stridor. No wheezing.   Abdominal:      General: Bowel sounds are normal.      Palpations: Abdomen is soft. There is no mass.      Tenderness: There is no abdominal tenderness. There is no guarding or rebound.   Musculoskeletal: Normal range of motion.         General: No tenderness.   Lymphadenopathy:      Cervical: No cervical adenopathy.   Skin:     General: Skin is warm.      Findings: No erythema or rash.   Neurological:      Mental Status: She is alert and oriented to person, place, and time.      Motor: No abnormal muscle tone.   Psychiatric:         Behavior: Behavior normal.       Bilateral chest wall exam does not show any palpable " nodularities.  Bilateral axilla was negative  RECENT LABS  WBC   Date Value Ref Range Status   11/16/2020 7.53 3.40 - 10.80 10*3/mm3 Final     RBC   Date Value Ref Range Status   11/16/2020 5.07 3.77 - 5.28 10*6/mm3 Final     Hemoglobin   Date Value Ref Range Status   11/16/2020 14.0 12.0 - 15.9 g/dL Final     Hematocrit   Date Value Ref Range Status   11/16/2020 44.3 34.0 - 46.6 % Final     MCV   Date Value Ref Range Status   11/16/2020 87.4 79.0 - 97.0 fL Final     MCH   Date Value Ref Range Status   11/16/2020 27.6 26.6 - 33.0 pg Final     MCHC   Date Value Ref Range Status   11/16/2020 31.6 31.5 - 35.7 g/dL Final     RDW   Date Value Ref Range Status   11/16/2020 13.5 12.3 - 15.4 % Final     RDW-SD   Date Value Ref Range Status   11/16/2020 42.0 37.0 - 54.0 fl Final     MPV   Date Value Ref Range Status   11/16/2020 11.0 6.0 - 12.0 fL Final     Platelets   Date Value Ref Range Status   11/16/2020 136 (L) 140 - 450 10*3/mm3 Final     Neutrophil %   Date Value Ref Range Status   11/16/2020 67.9 42.7 - 76.0 % Final     Lymphocyte %   Date Value Ref Range Status   11/16/2020 23.6 19.6 - 45.3 % Final     Monocyte %   Date Value Ref Range Status   11/16/2020 6.9 5.0 - 12.0 % Final     Eosinophil %   Date Value Ref Range Status   11/16/2020 1.3 0.3 - 6.2 % Final     Basophil %   Date Value Ref Range Status   11/16/2020 0.3 0.0 - 1.5 % Final     Immature Grans %   Date Value Ref Range Status   07/20/2020 0.7 (H) 0.0 - 0.5 % Final     Neutrophils, Absolute   Date Value Ref Range Status   11/16/2020 5.11 1.70 - 7.00 10*3/mm3 Final     Lymphocytes, Absolute   Date Value Ref Range Status   11/16/2020 1.78 0.70 - 3.10 10*3/mm3 Final     Monocytes, Absolute   Date Value Ref Range Status   11/16/2020 0.52 0.10 - 0.90 10*3/mm3 Final     Eosinophils, Absolute   Date Value Ref Range Status   11/16/2020 0.10 0.00 - 0.40 10*3/mm3 Final     Basophils, Absolute   Date Value Ref Range Status   11/16/2020 0.02 0.00 - 0.20 10*3/mm3  Final     Immature Grans, Absolute   Date Value Ref Range Status   07/20/2020 0.05 0.00 - 0.05 10*3/mm3 Final     nRBC   Date Value Ref Range Status   11/03/2020 0.0 0.0 - 0.2 /100 WBC Final       Lab Results   Component Value Date    GLUCOSE 288 (H) 11/03/2020    BUN 13 11/03/2020    CREATININE 0.73 11/03/2020    EGFRIFNONA 82 11/03/2020    EGFRIFAFRI >60 10/12/2018    BCR 17.8 11/03/2020    K 4.1 11/03/2020    CO2 25.0 11/03/2020    CALCIUM 9.7 11/03/2020    ALBUMIN 4.10 09/19/2020    AST 56 (H) 09/19/2020    ALT 73 (H) 09/19/2020       Assessment    · ypT2 N1 aM0 status post left modified radical mastectomy with lymph node dissection and right prophylactic mastectomy in 2017 performed at [facility].  ER positive, [place] positive and HER-2/bishop negative.  Status post bilateral chest wall reconstruction  · Intolerance of Arimidex and tamoxifen  · Status post neoadjuvant Arimidex prior to bilateral mastectomies  · Complex cardiac medical history including tetralogy of Fallot, coarctation of aorta, VSD status post repair.  Status post stent placement for coarctation of aorta  · Personal history and strong family history of breast cancer in multiple in the relatives on paternal side of the family including 4 paternal aunts in their 30s and 40s and 2 maternal aunts at age of 20s and 50s.  There is concern for possible hereditary breast cancer syndrome.  Patient may be  interested in pursuing cancer genetics for her management.      Discussion    Patient has a complex medical history including significant multiple cardiac issues that are congenital.  Have drawn a CBC, CMP today.  We will plan to see her back in 4 weeks      Plans:    · CBC, CMP,   · Patient has been given information on cancer genetics to review.  · Follow-up 4 weeks        I have reviewed labs results, imaging, vitals, and medications with the patient today.  Will follow up in 1 month with me.      Patient verbalized understanding and  is in agreement of the above plan.            Thank you very much for allowing me to participate in the care of Gladys, I will keep you updated on her progress      I spent 60 total minutes, face-to-face, caring for Gladys today.  80% of this time involved counseling and/or coordination of care as documented within this note.

## 2020-11-11 ENCOUNTER — APPOINTMENT (OUTPATIENT)
Dept: MRI IMAGING | Facility: HOSPITAL | Age: 60
End: 2020-11-11

## 2020-11-16 ENCOUNTER — TELEPHONE (OUTPATIENT)
Dept: ONCOLOGY | Facility: CLINIC | Age: 60
End: 2020-11-16

## 2020-11-16 ENCOUNTER — CONSULT (OUTPATIENT)
Dept: ONCOLOGY | Facility: CLINIC | Age: 60
End: 2020-11-16

## 2020-11-16 ENCOUNTER — APPOINTMENT (OUTPATIENT)
Dept: LAB | Facility: HOSPITAL | Age: 60
End: 2020-11-16

## 2020-11-16 VITALS
DIASTOLIC BLOOD PRESSURE: 95 MMHG | RESPIRATION RATE: 16 BRPM | HEART RATE: 90 BPM | BODY MASS INDEX: 30.89 KG/M2 | TEMPERATURE: 97.7 F | SYSTOLIC BLOOD PRESSURE: 160 MMHG | WEIGHT: 163.6 LBS | HEIGHT: 61 IN

## 2020-11-16 DIAGNOSIS — C50.919 MALIGNANT NEOPLASM OF FEMALE BREAST, UNSPECIFIED ESTROGEN RECEPTOR STATUS, UNSPECIFIED LATERALITY, UNSPECIFIED SITE OF BREAST (HCC): Primary | ICD-10-CM

## 2020-11-16 LAB
ALBUMIN SERPL-MCNC: 4.2 G/DL (ref 3.5–5.2)
ALBUMIN/GLOB SERPL: 1.4 G/DL
ALP SERPL-CCNC: 108 U/L (ref 39–117)
ALT SERPL W P-5'-P-CCNC: 16 U/L (ref 1–33)
ANION GAP SERPL CALCULATED.3IONS-SCNC: 11 MMOL/L (ref 5–15)
AST SERPL-CCNC: 22 U/L (ref 1–32)
BASOPHILS # BLD AUTO: 0.02 10*3/MM3 (ref 0–0.2)
BASOPHILS NFR BLD AUTO: 0.3 % (ref 0–1.5)
BILIRUB SERPL-MCNC: 0.2 MG/DL (ref 0–1.2)
BUN SERPL-MCNC: 18 MG/DL (ref 6–20)
BUN/CREAT SERPL: 25.7 (ref 7–25)
CALCIUM SPEC-SCNC: 9.6 MG/DL (ref 8.6–10.5)
CHLORIDE SERPL-SCNC: 102 MMOL/L (ref 98–107)
CO2 SERPL-SCNC: 25 MMOL/L (ref 22–29)
CREAT SERPL-MCNC: 0.7 MG/DL (ref 0.57–1)
DEPRECATED RDW RBC AUTO: 42 FL (ref 37–54)
EOSINOPHIL # BLD AUTO: 0.1 10*3/MM3 (ref 0–0.4)
EOSINOPHIL NFR BLD AUTO: 1.3 % (ref 0.3–6.2)
ERYTHROCYTE [DISTWIDTH] IN BLOOD BY AUTOMATED COUNT: 13.5 % (ref 12.3–15.4)
GFR SERPL CREATININE-BSD FRML MDRD: 86 ML/MIN/1.73
GLOBULIN UR ELPH-MCNC: 3.1 GM/DL
GLUCOSE SERPL-MCNC: 260 MG/DL (ref 65–99)
HCT VFR BLD AUTO: 44.3 % (ref 34–46.6)
HGB BLD-MCNC: 14 G/DL (ref 12–15.9)
LYMPHOCYTES # BLD AUTO: 1.78 10*3/MM3 (ref 0.7–3.1)
LYMPHOCYTES NFR BLD AUTO: 23.6 % (ref 19.6–45.3)
MCH RBC QN AUTO: 27.6 PG (ref 26.6–33)
MCHC RBC AUTO-ENTMCNC: 31.6 G/DL (ref 31.5–35.7)
MCV RBC AUTO: 87.4 FL (ref 79–97)
MONOCYTES # BLD AUTO: 0.52 10*3/MM3 (ref 0.1–0.9)
MONOCYTES NFR BLD AUTO: 6.9 % (ref 5–12)
NEUTROPHILS NFR BLD AUTO: 5.11 10*3/MM3 (ref 1.7–7)
NEUTROPHILS NFR BLD AUTO: 67.9 % (ref 42.7–76)
PLATELET # BLD AUTO: 136 10*3/MM3 (ref 140–450)
PMV BLD AUTO: 11 FL (ref 6–12)
POTASSIUM SERPL-SCNC: 4.4 MMOL/L (ref 3.5–5.2)
PROT SERPL-MCNC: 7.3 G/DL (ref 6–8.5)
RBC # BLD AUTO: 5.07 10*6/MM3 (ref 3.77–5.28)
SODIUM SERPL-SCNC: 138 MMOL/L (ref 136–145)
WBC # BLD AUTO: 7.53 10*3/MM3 (ref 3.4–10.8)

## 2020-11-16 PROCEDURE — 36415 COLL VENOUS BLD VENIPUNCTURE: CPT | Performed by: INTERNAL MEDICINE

## 2020-11-16 PROCEDURE — 85025 COMPLETE CBC W/AUTO DIFF WBC: CPT | Performed by: INTERNAL MEDICINE

## 2020-11-16 PROCEDURE — 99205 OFFICE O/P NEW HI 60 MIN: CPT | Performed by: INTERNAL MEDICINE

## 2020-11-16 PROCEDURE — 80053 COMPREHEN METABOLIC PANEL: CPT | Performed by: INTERNAL MEDICINE

## 2020-11-17 NOTE — PROGRESS NOTES
Called pt and let her know that her glucose was elevated and to f/u with PCP for diabetes management.  Pt v/u.

## 2020-11-18 ENCOUNTER — OFFICE VISIT (OUTPATIENT)
Dept: NEUROSURGERY | Facility: CLINIC | Age: 60
End: 2020-11-18

## 2020-11-18 DIAGNOSIS — Z98.1 S/P CERVICAL SPINAL FUSION: Primary | ICD-10-CM

## 2020-11-18 PROCEDURE — 99024 POSTOP FOLLOW-UP VISIT: CPT | Performed by: NURSE PRACTITIONER

## 2020-11-18 NOTE — PROGRESS NOTES
You have chosen to receive care through a telephone visit. Do you consent to use a telephone visit for your medical care today? Yes      Subjective   History of Present Illness: Gladys Garrison is a 60 y.o. female seen for her postop follow-up.  Patient underwent a C4 vertebrectomy and ACDF of C3-C5 with removal of C5-C6 hardware with Dr. Kaba on 9/22/2020.  Review of records show that patient did present to the emergency room on 11/3/2020 for complaints of drainage from her cervical fusions incision site.  Patient was afebrile and nontoxic-appearing.  Lab results were unremarkable with normal WBC but patient did have elevated glucose.  Patient did have some surrounding edema edema near the incision site.  Imaging was ordered and showed some fat stranding but no signs of abscess.  No definite hardware complication identified as well.  Patient was started on Keflex and discharged.    Review of records shows that patient was last seen in the office on 10/16/2020 for her postop follow-up by Nai Barriga.  She reported at that time to have pain in both of her arms with numbness and tingling.  She also reported dexterity issues in her right hand and dropping items.  Her incision was assessed and there were no signs of redness, drainage, or swelling.  She was referred to physical therapy.  Patient states that her pain in her neck around her shoulder levels have increased.  She states that she feels like she is dropping items more for the right hand and reports right arm tingling.  Patient also states that when she stands for a while she gets a pressure that turns into pain in the middle of her back that does radiate down to her hip and knee.  She states that she fell 4 days ago and reports having her neck brace in place at the time.  She states she fell 3 steps and her pain is increased in her neck as well as she is now having what she describes as electricity bolts that travel from the middle of her back down to  her legs.  Patient states that her complaints are worse in the morning and that she is not taking any narcotic pain medication only and aspirin.  Patient also reports being seen in the emergency room at the beginning of November for what she thought was an infection of her incision.  She states that she finished her Keflex and that she has no drainage or redness along her incision.  She states that she has no swallowing difficulties.  Patient denies any bowel/bladder dysfunction, balance issues, headaches, or swallowing difficulties.      Neck Pain   This is a chronic problem. The current episode started more than 1 year ago. The problem occurs daily. The problem has been gradually worsening. The pain is associated with a fall. The pain is present in the right side. The quality of the pain is described as aching and shooting. The pain is moderate. The symptoms are aggravated by position. The pain is same all the time. Stiffness is present in the morning. Associated symptoms include leg pain, numbness, tingling and weakness. Pertinent negatives include no pain with swallowing or trouble swallowing. Treatments tried: aspirin. The treatment provided mild relief.       The following portions of the patient's history were reviewed and updated as appropriate: allergies, current medications, past family history, past medical history, past social history, past surgical history and problem list.    Review of Systems   HENT: Negative for trouble swallowing.    Genitourinary: Negative for decreased urine volume and difficulty urinating.   Musculoskeletal: Positive for back pain, neck pain and neck stiffness. Negative for gait problem.   Neurological: Positive for tingling, weakness and numbness.   All other systems reviewed and are negative.      Objective     .There were no vitals taken for this visit.   There is no height or weight on file to calculate BMI.    No physical assessment performed due to telehealth   Pt is alert  and oriented x 3  Speech is articulate and coherent  Recent and remote memory intact    Assessment/Plan   Independent Review of Radiographic Studies:      I personally reviewed the images from the following studies.  CT soft tissues neck 11/3/2020  1.  No discrete fluid collection or abscess identified within the superficial soft tissues or within the deep paraspinal soft tissues  2.  No gross hardware failure noted.      Medical Decision Making:    I am concerned with patient's increase in her radiculopathy and myelopathy complaints.  I am also concerned about the electricity that patient is feeling down to her legs.  I would like to get a CT of her spine stat for review of hardware.  Patient states that she will be able to get the CT on Friday 11 6/2020.  We will follow her up in the office next week for further assessment and evaluation.  Patient was explained that if she begins to have issues controlling her bladder or her bowels or further experiences leg weakness that she is to present to the emergency room due to concerns of spinal cord compression.    Diagnoses and all orders for this visit:    1. S/P cervical spinal fusion (Primary)  -     Cancel: CT Cervical Spine Without Contrast; Future  -     Cancel: CT Cervical Spine Without Contrast; Future  -     CT Cervical Spine Without Contrast; Future  -     CT Cervical Spine With & Without Contrast; Future      Return in about 1 week (around 11/25/2020).    I spent 18 minutes with the patient in direct communication reviewing imaging and explaining the pathology and the treatment plan.         Floresita Contreras, APRN  11/18/20  12:51 EST

## 2020-11-19 ENCOUNTER — TELEPHONE (OUTPATIENT)
Dept: FAMILY MEDICINE CLINIC | Facility: CLINIC | Age: 60
End: 2020-11-19

## 2020-11-19 NOTE — TELEPHONE ENCOUNTER
SPOKE WITH PATIENT SHE STATES HER CARDIOLOGIST IS DR. LEVENTHAL @ [facility] [phone] WILL CALL TOMORROW AND GET FAX NUMBER- THEY WERE CLOSED TONIGHT.

## 2020-11-19 NOTE — TELEPHONE ENCOUNTER
Hollie, Can you please find out where this needs to sent to I don't know where at [facility] or have a fax number it needs to be sent to or anything.      Thanks, Vaishali

## 2020-11-19 NOTE — TELEPHONE ENCOUNTER
I just got a message about this, too, via secure chat    This patient has a MRI brain w/wo scheduled for Monday. She has a pacemaker which requires her cardiologist to fill out a consent form. The cardiologist is located at [facility] in [place], [place]. This pt has tried to come here to get her mri's in the past but the cardiologist will only consent for her to be scanned at [facility] in [place]. I have spoke with the pt to let her know, which she already did. The MRI order you placed will need to be sent to . Thank you

## 2020-11-19 NOTE — TELEPHONE ENCOUNTER
PATIENT'S MRI SCHEDULED 11/23/20 HAS TO BE CANCELLED BECAUSE HER CARDIOLOGIST DOESN'T HAVE PRIVILEGES IN [place]. PATIENT NEEDS A NEW MRI ORDER SENT TO A [facility] RADIOLOGIST.    PLEASE ADVISE.    PATIENT CAN BE REACHED AT:  [phone]

## 2020-11-23 ENCOUNTER — APPOINTMENT (OUTPATIENT)
Dept: MRI IMAGING | Facility: HOSPITAL | Age: 60
End: 2020-11-23

## 2020-11-23 ENCOUNTER — TREATMENT (OUTPATIENT)
Dept: PHYSICAL THERAPY | Facility: CLINIC | Age: 60
End: 2020-11-23

## 2020-11-23 ENCOUNTER — OFFICE VISIT (OUTPATIENT)
Dept: FAMILY MEDICINE CLINIC | Facility: CLINIC | Age: 60
End: 2020-11-23

## 2020-11-23 VITALS
HEART RATE: 75 BPM | SYSTOLIC BLOOD PRESSURE: 132 MMHG | TEMPERATURE: 96.9 F | OXYGEN SATURATION: 100 % | RESPIRATION RATE: 16 BRPM | DIASTOLIC BLOOD PRESSURE: 84 MMHG | WEIGHT: 164 LBS | BODY MASS INDEX: 30.99 KG/M2

## 2020-11-23 DIAGNOSIS — R41.3 MEMORY LOSS: ICD-10-CM

## 2020-11-23 DIAGNOSIS — M54.12 CERVICAL RADICULITIS: Primary | ICD-10-CM

## 2020-11-23 DIAGNOSIS — M54.32 LEFT SIDED SCIATICA: ICD-10-CM

## 2020-11-23 DIAGNOSIS — M47.12 CERVICAL SPONDYLOSIS WITH MYELOPATHY: Primary | ICD-10-CM

## 2020-11-23 PROCEDURE — 99213 OFFICE O/P EST LOW 20 MIN: CPT | Performed by: INTERNAL MEDICINE

## 2020-11-23 PROCEDURE — 97110 THERAPEUTIC EXERCISES: CPT | Performed by: PHYSICAL THERAPIST

## 2020-11-23 PROCEDURE — 97530 THERAPEUTIC ACTIVITIES: CPT | Performed by: PHYSICAL THERAPIST

## 2020-11-23 PROCEDURE — 97112 NEUROMUSCULAR REEDUCATION: CPT | Performed by: PHYSICAL THERAPIST

## 2020-11-23 NOTE — PROGRESS NOTES
Physical Therapy Daily Progress Note    Patient: Gladys Garrison  : 1960  Referring practitioner: Nai Barriga PA-C  Today's Date: 2020    VISIT#: 2    Subjective   Pt reports: co pain from the middle of her back that goes down both legs feels like electric shocks. Presently rates pain 4-5/10. She drove to the clinic today but her grand kids drive her. Riding in the car really bothers her and has to take a pain pill.     Objective     See Exercise, Manual, and Modality Logs for complete treatment. Initiated there ex and HEP as noted. Copies of new exercises issued.  Discussed postural corrections and body mech, squatting instead of bending over. Pt gets dizzy when she sits up from supine position and from sit to stand.     Patient Education: maintaining proper sitting posture using a pillow or towel roll behind back.      Assessment & Plan     Assessment  Assessment details: Good henrietta to today's treatment session. Pt demo understanding of HEP.           Progress per Plan of Care            Timed:         Manual Therapy:         mins  41720;     Therapeutic Exercise:    15     mins  97719;     Neuromuscular Laura:    15    mins  09213;    Therapeutic Activity:    10      mins  78597;     Gait Training:           mins  86511;     Ultrasound:          mins  25417;    Ionto                                   mins   16372  Self Care                            mins   18610  Canal repositioning           mins    17318    Un-Timed:  Electrical Stimulation:         mins  99375 ( );  Traction          mins 26917  Low Eval          Mins  00711  Mod Eval          Mins  64456  High Eval                            Mins  10932  Re-Eval                               mins  51720    Timed Treatment:   40   mins   Total Treatment:    40    mins    Jose Antonio Lopez PT, CLT  Physical Therapist

## 2020-11-27 ENCOUNTER — HOSPITAL ENCOUNTER (OUTPATIENT)
Dept: CT IMAGING | Facility: HOSPITAL | Age: 60
Discharge: HOME | End: 2020-11-27
Admitting: NURSE PRACTITIONER

## 2020-11-27 DIAGNOSIS — Z98.1 S/P CERVICAL SPINAL FUSION: ICD-10-CM

## 2020-11-27 PROCEDURE — 72125 CT NECK SPINE W/O DYE: CPT

## 2020-11-30 ENCOUNTER — HOSPITAL ENCOUNTER (OUTPATIENT)
Dept: SPEECH THERAPY | Facility: HOSPITAL | Age: 60
Setting detail: THERAPIES SERIES
Discharge: HOME | End: 2020-11-30

## 2020-11-30 DIAGNOSIS — R41.3 MEMORY LOSS: Primary | ICD-10-CM

## 2020-11-30 PROCEDURE — 96125 COGNITIVE TEST BY HC PRO: CPT

## 2020-12-01 NOTE — PROGRESS NOTES
Subjective   Gladys Garrison is a 60 y.o. female.     History of Present Illness     {Common H&P Review Areas:58409}    Review of Systems    Objective   Physical Exam      Assessment/Plan   {Assess/PlanSmartLinks:29862}
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
## 2020-12-01 NOTE — PROGRESS NOTES
Subjective   History of Present Illness: Gladys Garrison is a 60 y.o. female {Specialty - why patient here?:[id number]}    History Of Present Illness:    The following portions of the patient's history were reviewed and updated as appropriate: allergies, current medications, past family history, past medical history, past social history, past surgical history, and problem list.    Past Medical History:   Diagnosis Date   • Allergic    • Breast cancer (CMS/HCC) 2017    mets to lymph nodes; did not do radiation   • Cancer of unknown origin (CMS/HCC)    • Compression fx, thoracic spine, open, initial encounter (CMS/HCC)    • Coronary artery disease    • Diabetes mellitus (CMS/HCC)    • Heart disease, unspecified    • Hepatitis C    • Hypertension    • Sleep apnea     no machine   • Type 2 diabetes mellitus (CMS/HCC) 2017        Past Surgical History:   Procedure Laterality Date   • BACK SURGERY      neck   • BREAST RECONSTRUCTION Bilateral    • CARDIAC ABLATION       atrial tachycardia x 5 ablations    • CARDIAC CATHETERIZATION     • CARDIAC SURGERY      stent placed in aorta   • CARDIAC SURGERY      6 surgeries as baby    • CERVICAL FUSION ANTERIOR WITH ARTIFICIAL DISCECTOMY IMPLANTATION N/A 2020    Procedure: C4 VERTEBRECTOMY AND ANTERIOR CERIVCAL DISCECTOMY WITH FUSION OF CERVICAL THREE THROUGH FIVE WITH REMOVAL OF HARDWARE C5-C6;  Surgeon: Mark Kaba MD;  Location: [facility] MAIN OR;  Service: Neurosurgery;  Laterality: N/A;   •  SECTION      x2   • COLONOSCOPY N/A 10/23/2020    Procedure: COLONOSCOPY WITH POLYPECTOMY X6;  Surgeon: Stanley Bourne MD;  Location: [facility] ENDOSCOPY;  Service: Gastroenterology;  Laterality: N/A;  POLYPS, INTERNAL HEMORRHOIDS   • ENDOSCOPY N/A 10/23/2020    Procedure: ESOPHAGOGASTRODUODENOSCOPY WITH BIOPSY X 1 AREA;  Surgeon: Stanley Bourne MD;  Location: [facility] ENDOSCOPY;  Service: Gastroenterology;  Laterality: N/A;  GASTRITIS,  "ESOPHAGITIS, HIATAL HERNIA   • KNEE SURGERY  2000   • MASTECTOMY Bilateral    • NECK SURGERY     • PACEMAKER IMPLANTATION            Current Outpatient Medications:   •  Accu-Chek FastClix Lancets misc, For finger stick 4x/d, Disp: 100 each, Rfl: 12  •  Alcohol Swabs 70 % pads, Use tid, Disp: 300 each, Rfl: 2  •  aspirin 81 MG chewable tablet, Chew 1 tablet Daily. (Patient taking differently: Chew 81 mg Daily. Hold DOS), Disp: 30 tablet, Rfl: 1  •  Blood Glucose Monitoring Suppl (ACCU-CHEK TEODORO PLUS) w/Device kit, Use as directed   DX  E11.9, Disp: 1 kit, Rfl: 0  •  cephalexin (KEFLEX) 500 MG capsule, Take 1 capsule by mouth 3 (Three) Times a Day., Disp: 30 capsule, Rfl: 0  •  Continuous Blood Gluc Sensor (FreeStyle Rajeev 14 Day Sensor) [facility], 1 each Every 14 (Fourteen) Days., Disp: 6 each, Rfl: 1  •  cyclobenzaprine (FLEXERIL) 10 MG tablet, TAKE 1 TABLET BY MOUTH ONCE EVERY NIGHT, Disp: 30 tablet, Rfl: 0  •  Epclusa 400-100 MG tablet, Take 1 tablet by mouth Daily. At 6 pm   ( for total 12 weeks ), Disp: , Rfl:   •  glucose blood (Accu-Chek Teodoro Plus) test strip, To check blood sugar 4x/d, Disp: 150 each, Rfl: 12  •  HYDROcodone-acetaminophen (NORCO) 5-325 MG per tablet, Take 1 tablet by mouth Every 6 (Six) Hours As Needed for Severe Pain ., Disp: 28 tablet, Rfl: 0  •  insulin NPH-insulin regular (humuLIN 70/30,novoLIN 70/30) (70-30) 100 UNIT/ML injection, Inject 40 Units under the skin into the appropriate area as directed Every Morning. (Patient taking differently: Inject 40 Units under the skin into the appropriate area as directed Every Morning. Hold DOS), Disp: , Rfl:   •  Insulin Syringe 31G X 5/16\" 1 ML misc, 1 syringe 2 (Two) Times a Day., Disp: 100 each, Rfl: 1  •  lisinopril (PRINIVIL,ZESTRIL) 20 MG tablet, Take 20 mg by mouth Daily., Disp: , Rfl:   •  rosuvastatin (CRESTOR) 10 MG tablet, Take one daily (Patient taking differently: Take 10 mg by mouth Every Night.), Disp: , Rfl:      Social History "     Socioeconomic History   • Marital status:      Spouse name: Not on file   • Number of children: 2   • Years of education: Not on file   • Highest education level: Not on file   Occupational History     Employer: [employer]   Tobacco Use   • Smoking status: Never Smoker   • Smokeless tobacco: Never Used   Substance and Sexual Activity   • Alcohol use: Yes     Alcohol/week: 1.0 standard drinks     Types: 1 Glasses of wine per week     Frequency: Monthly or less     Comment: rare   • Drug use: Not Currently   • Sexual activity: Defer   Social History Narrative        So in: Lives with brother in law and     Lives 1/2 time in [place]        Family History   Problem Relation Age of Onset   • Lymphoma Mother    • Heart disease Mother    • Stroke Mother    • Cancer Mother    • Other Father    • Diabetes Sister    • Thyroid disease Sister    • No Known Problems Brother    • No Known Problems Brother    • Diabetes type I Half-Sister    • Thyroid cancer Half-Sister    • Cancer Maternal Aunt         Review of Systems    Objective     There were no vitals filed for this visit.  There is no height or weight on file to calculate BMI.      Physical Exam  Neurologic Exam        Assessment/Plan   Independent Review of Radiographic Studies:      I personally reviewed the images from the following studies.    ***    Medical Decision Making:      ***  There are no diagnoses linked to this encounter.  No follow-ups on file.

## 2020-12-02 ENCOUNTER — OFFICE VISIT (OUTPATIENT)
Dept: NEUROSURGERY | Facility: CLINIC | Age: 60
End: 2020-12-02

## 2020-12-02 VITALS
BODY MASS INDEX: 31.34 KG/M2 | DIASTOLIC BLOOD PRESSURE: 82 MMHG | WEIGHT: 166 LBS | HEART RATE: 120 BPM | SYSTOLIC BLOOD PRESSURE: 118 MMHG | HEIGHT: 61 IN

## 2020-12-02 DIAGNOSIS — Z98.1 S/P CERVICAL SPINAL FUSION: Primary | ICD-10-CM

## 2020-12-02 PROCEDURE — 99024 POSTOP FOLLOW-UP VISIT: CPT | Performed by: NURSE PRACTITIONER

## 2020-12-02 RX ORDER — CYCLOBENZAPRINE HCL 10 MG
10 TABLET ORAL 3 TIMES DAILY PRN
Qty: 90 TABLET | Refills: 0 | Status: SHIPPED | OUTPATIENT
Start: 2020-12-02 | End: 2021-08-17

## 2020-12-02 NOTE — PROGRESS NOTES
Subjective   History of Present Illness: Gladys Garrison is a 60 y.o. female being seen in the office today for follow-up.  Patient was last seen on 11/18/2020 myself.  Patient states that she is doing fairly well with some occasional neck pain.  She states that most of her pain feels muscle related and along her shoulders bilaterally with the right side more affected than left side.  Also states that she experiences some headache along the right side of her head that seem to began at the right side of her neck.  She states that she also is still experiencing some numbness and tingling down her right arm into her hand that is better than before surgery but not resolved.  She states that she still occasionally has some trouble holding onto items due to the numbness.  Patient also states that she is suffering from some chronic mid back pain that Dr. Kaba was aware of.  Patient reports continuing with physical therapy.  She states she is wearing her soft collar that she was told to wear when she was discharged from the hospital.  She states that she still does have the hard cervical collar as well.  Patient states that she has been keeping her blood sugars under control with occasional spikes due to forgetting her insulin.  Patient denies any bowel/bladder dysfunction, leg weakness, arm weakness, fever, shortness of air or leg swelling.  Patient underwent a C4 vertebrectomy and ACDF of C3-C5 with removal of C5-C6 hardware with Dr. Kaba on 9/22/2020.  During last visit patient stated that her pain in her neck and her shoulder level have increased.  She felt like she was dropping more items in the right hand and reported right arm tingling.  Patient stated that she had fallen but had a neck brace on resulted in increasing pain in her neck as well as described electricity sensations that traveled from her middle of her back to her legs. Patient states that her pain in her neck around her shoulder levels have  increased.  She states that she feels like she is dropping items more for the right hand and reports right arm tingling.    Patient also reports being seen in the emergency room at the beginning of November for what she thought was an infection of her incision.  She states that she finished her Keflex and that she has no drainage or redness along her incision.  She states that she has no swallowing difficulties. Review of records did show that patient presented to the emergency room on 11/3/2020 for complaints of drainage from her cervical fusions incision site.  Patient was afebrile and nontoxic-appearing.  Lab results were unremarkable with normal WBC but patient did have elevated glucose.  Patient did have some surrounding edema edema near the incision site.  Imaging was ordered and showed some fat stranding but no signs of abscess.  No definite hardware complication identified as well.  Patient was started on Keflex and discharged.    Review of records shows that patient reported at her 2-week postop appointment that she was having pain in both of her arms with numbness and tingling.  She reports of dexterity issues in her right hand.  Her incision was assessed and found to be clean dry and intact with no signs of infection.  She was referred to physical therapy.     Neck Pain   This is a chronic problem. The current episode started more than 1 year ago. The problem occurs daily. The problem has been gradually improving. The pain is associated with nothing. The pain is present in the midline and right side. The quality of the pain is described as aching and burning. The pain is mild. The symptoms are aggravated by stress and position. Associated symptoms include headaches, numbness and tingling. Pertinent negatives include no chest pain, leg pain, pain with swallowing, trouble swallowing or weakness. She has tried neck support and muscle relaxants for the symptoms. The treatment provided moderate relief.  "      The following portions of the patient's history were reviewed and updated as appropriate: allergies, current medications, past family history, past medical history, past social history, past surgical history and problem list.    Review of Systems   HENT: Negative for trouble swallowing.    Respiratory: Negative for shortness of breath.    Cardiovascular: Negative for chest pain and leg swelling.   Musculoskeletal: Positive for back pain and neck pain. Negative for gait problem.   Neurological: Positive for tingling, numbness and headaches. Negative for weakness.   All other systems reviewed and are negative.      Objective     ./82   Pulse 120   Ht 154.9 cm (61\")   Wt 75.3 kg (166 lb)   BMI 31.37 kg/m²    Body mass index is 31.37 kg/m².      Physical Exam  Eyes:      Pupils: Pupils are equal, round, and reactive to light.   Neurological:      Mental Status: She is oriented to person, place, and time.      Coordination: Finger-Nose-Finger Test and Romberg Test normal.      Gait: Gait is intact.      Deep Tendon Reflexes: Strength normal.      Reflex Scores:       Tricep reflexes are 1+ on the right side and 1+ on the left side.       Bicep reflexes are 1+ on the right side and 1+ on the left side.       Brachioradialis reflexes are 1+ on the right side and 1+ on the left side.  Psychiatric:         Speech: Speech normal.       Neurologic Exam     Mental Status   Oriented to person, place, and time.   Speech: speech is normal   Level of consciousness: alert    Cranial Nerves     CN III, IV, VI   Pupils are equal, round, and reactive to light.    CN XI   Right sternocleidomastoid strength: normal  Left sternocleidomastoid strength: normal  Right trapezius strength: normal  Left trapezius strength: normal    Motor Exam   Muscle bulk: normal    Strength   Strength 5/5 throughout.     Sensory Exam   Right arm light touch: decreased from fingers  Left arm light touch: normal    Gait, Coordination, and " Reflexes     Gait  Gait: normal    Coordination   Romberg: negative  Finger to nose coordination: normal    Reflexes   Right brachioradialis: 1+  Left brachioradialis: 1+  Right biceps: 1+  Left biceps: 1+  Right triceps: 1+  Left triceps: 1+  Right Ross: absent  Left Ross: absent      Cervical spine  -  palpable masses, trachea midline, no lesions, no deformity  positive paraspinal along trapezius muscles bilaterally     Thoracic  +TTP midline,     Incision is well granulated with no obvious swelling.      Assessment/Plan   Independent Review of Radiographic Studies:      I personally reviewed the images from the following studies.    CT cervical spine 11/27/2020  1.  Stable postoperative changes C3-C6 ACDF with corpectomy at C4.  With no gross evidence of hardware failure.  2.  Cervical thoracic scoliosis that appears stable from previous imaging.  3.  Degenerative changes with facet hypertrophy at multi levels with no significant spinal canal stenosis or neuroforaminal narrowing felt to be seen.    Medical Decision Making:    Patient is doing well.  SHe seems to be tolerating physical therapy well and even though she still has  some numbness and tingling it is better down her right arm.  I feel patient is suffering some muscle spasms along her trapezius bilaterally due to capsular stretch along the facet joints.  I believe her headaches are cervicogenic in nature due to facet stretch as well, and may also be a result of her soft cervical collar being too tight.  I did ask patient to switch to hard collar anytime she is up doing work at her house or in a car to provide extra stability to her fusion.  I did also advise patient not to take any aspirin products or NSAIDs or anti-inflammatories as they inhibit bone fusion.  I educated patient on importance of keeping blood sugar under control for proper healing.  Patient is able to go back to work on light duty with no lifting more than 20 pounds.  Due to not  seeing much osseous ingrowth into her fusion, which is expected, I did asked patient to continue with her collar until she is further evaluated.    Diagnoses and all orders for this visit:    1. S/P cervical spinal fusion (Primary)  -     CT Cervical Spine Without Contrast; Future    Other orders  -     cyclobenzaprine (FLEXERIL) 10 MG tablet; Take 1 tablet by mouth 3 (Three) Times a Day As Needed for Muscle Spasms.  Dispense: 90 tablet; Refill: 0      Return in about 11 weeks (around 2/17/2021) for Postop follow-up.    This patient was examined wearing appropriate personal protective equipment.     I have spent 15 minutes in direct communication with patient discussing post operative expectations and activity recommendations/restrictions including walking, lift restrictions. I have also instructed patient to continue wearing brace anytime they are up past 45 degrees for more than 15 minutes.  Patient agrees to follow with plan.          Floresita Contreras, APRN  12/02/20  07:53 EST

## 2020-12-03 ENCOUNTER — TELEPHONE (OUTPATIENT)
Dept: ONCOLOGY | Facility: HOSPITAL | Age: 60
End: 2020-12-03

## 2020-12-03 NOTE — TELEPHONE ENCOUNTER
"Distress Screening   Patient Name: Gladys Garrison  YOB: 1960  MRN #: [id number]    Patient completed distress screenin2020  Distress Level: 1010  Problems indicated:Transportation, dealing with children, dealing with partner, fear/anxiety, physical complaints    OSW called patient who is alert and oriented to person, place and time. She said she has significant stress because she sees, \"To many ologists\". She explained that she has many doctors, has had many surgeries and is burdened by having multiple appointments. She describes herself as a \"control freak\" stating she has been this way since she was a child. She adds she is more controlling for her own healthcare stating she had to learn at a young age to speak up for herself. She recently had cervical spine surgery and is dealing with this, this is her difficulty with transportation as she is unable to turn her head due to the c-collar. She relies on her  to get her to medical appointments. She said her son's wife left him. She did not pay their bills for the past three months and she and her  have used their savings to get their son back on his feet. Their own basic needs are met. Supportive care provided throughout. She ended the call abruptly stating she had to answer another call.     Referrals: Supportive care     Electronically signed by:   Kita Falcon LCSW, OSW-C  20, 15:48 EST        "

## 2020-12-09 ENCOUNTER — TELEPHONE (OUTPATIENT)
Dept: SPEECH THERAPY | Facility: HOSPITAL | Age: 60
End: 2020-12-09

## 2020-12-11 NOTE — PROGRESS NOTES
Hematology/Oncology Outpatient Follow Up    PATIENT NAME:Gladys Garrison  :1960  MRN: [id number]  PRIMARY CARE PHYSICIAN: Ignacia Lundy MD  REFERRING PHYSICIAN: Ignacia Lundy MD    Chief Complaint   Patient presents with   • Follow-up     breast cancer        HISTORY OF PRESENT ILLNESS:     This is a 60-year-old female who multiple comorbidities including congenital abnormalities such as hypoplastic kidney, tetralogy of Fallot, coarctation of the aorta and congenital VSD status post repair.  Patient developed syncopal episode and for that reason she had she had a CT scan of the chest which showed mass in the left breast.  She had diagnostic mammogram and ultrasound which showed a 2 cm spiculated mass in the left breast at the 1 o'clock position 6 cm from the nipple.  Biopsy of the left breast mass revealed invasive moderately differentiated carcinoma ER/NC positive and HER-2/bishop negative.  Patient also had an ultrasound of the axilla which was concerning for an abnormal left axillary lymph node with cortical thickening.  She apparently had an FNA of the left axillary nodule which was positive for malignancy.  On 2017 patient underwent left modified radical mastectomy, right prophylactic mastectomy and immediate breast reconstruction.  She also underwent right prophylactic mastectomy.  We have had her on records suggest that patient did have multifocal disease with pT2 pN1 aM0.  The largest focus measured 3.5 cm.  2 of 11 lymph nodes were positive for metastatic disease some with extracapsular extension.  Notes that patient did receive Arimidex neoadjuvant  from 2017 to 2018 prior to her bilateral mastectomies.    Review of her note suggest that she developed cough which she attributed to anastrozole and patient was then placed on tamoxifen.  She had MammaPrint testing which returned with low risk for relapse.    Her postop course was also complicated by left chest wall  abscess which resulted in I&D and removal of the left tissue expander. She was referred for radiation treatment boards ultimately declined.    Patient was then placed on tamoxifen in 2018 which she stopped after less than a month due to nausea and vomiting.  Patient in the interim also was diagnosed with chronic hepatitis C was seen by the hepatologist.  Tamoxifen was dose reduced to 10 mg daily with the goal to increase to 20 mg daily.      Patient has relocated to [facility].  She has transitioned her care to us now.  She is currently not on any antiestrogen therapy.     Her bilateral mastectomy specimen are available for review      Past Medical History:   Diagnosis Date   • Allergic    • Breast cancer (CMS/HCC) 2017    mets to lymph nodes; did not do radiation   • Cancer of unknown origin (CMS/HCC)    • Compression fx, thoracic spine, open, initial encounter (CMS/HCC)    • Coronary artery disease    • Diabetes mellitus (CMS/HCC)    • Heart disease, unspecified    • Hepatitis C    • Hypertension    • Sleep apnea     no machine   • Type 2 diabetes mellitus (CMS/HCC) 2017       Past Surgical History:   Procedure Laterality Date   • BACK SURGERY      neck   • BREAST RECONSTRUCTION Bilateral    • CARDIAC ABLATION       atrial tachycardia x 5 ablations    • CARDIAC CATHETERIZATION     • CARDIAC SURGERY      stent placed in aorta   • CARDIAC SURGERY      6 surgeries as baby    • CERVICAL FUSION ANTERIOR WITH ARTIFICIAL DISCECTOMY IMPLANTATION N/A 2020    Procedure: C4 VERTEBRECTOMY AND ANTERIOR CERIVCAL DISCECTOMY WITH FUSION OF CERVICAL THREE THROUGH FIVE WITH REMOVAL OF HARDWARE C5-C6;  Surgeon: Mark Kaba MD;  Location: [facility] MAIN OR;  Service: Neurosurgery;  Laterality: N/A;   •  SECTION      x2   • COLONOSCOPY N/A 10/23/2020    Procedure: COLONOSCOPY WITH POLYPECTOMY X6;  Surgeon: Stanley Bourne MD;  Location: [facility] ENDOSCOPY;  Service: Gastroenterology;   "Laterality: N/A;  POLYPS, INTERNAL HEMORRHOIDS   • ENDOSCOPY N/A 10/23/2020    Procedure: ESOPHAGOGASTRODUODENOSCOPY WITH BIOPSY X 1 AREA;  Surgeon: Stanley Bourne MD;  Location: [facility] ENDOSCOPY;  Service: Gastroenterology;  Laterality: N/A;  GASTRITIS, ESOPHAGITIS, HIATAL HERNIA   • KNEE SURGERY  2000   • MASTECTOMY Bilateral    • NECK SURGERY     • PACEMAKER IMPLANTATION           Current Outpatient Medications:   •  Accu-Chek FastClix Lancets misc, For finger stick 4x/d, Disp: 100 each, Rfl: 12  •  Alcohol Swabs 70 % pads, Use tid, Disp: 300 each, Rfl: 2  •  aspirin 81 MG chewable tablet, Chew 1 tablet Daily. (Patient taking differently: Chew 81 mg Daily. Hold DOS), Disp: 30 tablet, Rfl: 1  •  Blood Glucose Monitoring Suppl (ACCU-CHEK ARACELI PLUS) w/Device kit, Use as directed   DX  E11.9, Disp: 1 kit, Rfl: 0  •  cephalexin (KEFLEX) 500 MG capsule, Take 1 capsule by mouth 3 (Three) Times a Day., Disp: 30 capsule, Rfl: 0  •  Continuous Blood Gluc Sensor (FreeStyle Rajeev 14 Day Sensor) misc, 1 each Every 14 (Fourteen) Days., Disp: 6 each, Rfl: 1  •  cyclobenzaprine (FLEXERIL) 10 MG tablet, Take 1 tablet by mouth 3 (Three) Times a Day As Needed for Muscle Spasms., Disp: 90 tablet, Rfl: 0  •  Epclusa 400-100 MG tablet, Take 1 tablet by mouth Daily. At 6 pm   ( for total 12 weeks ), Disp: , Rfl:   •  glucose blood (Accu-Chek Araceli Plus) test strip, To check blood sugar 4x/d, Disp: 150 each, Rfl: 12  •  HYDROcodone-acetaminophen (NORCO) 5-325 MG per tablet, Take 1 tablet by mouth Every 6 (Six) Hours As Needed for Severe Pain ., Disp: 28 tablet, Rfl: 0  •  insulin NPH-insulin regular (humuLIN 70/30,novoLIN 70/30) (70-30) 100 UNIT/ML injection, Inject 40 Units under the skin into the appropriate area as directed Every Morning. (Patient taking differently: Inject 40 Units under the skin into the appropriate area as directed Every Morning. Hold DOS), Disp: , Rfl:   •  Insulin Syringe 31G X 5/16\" 1 ML misc, 1 " syringe 2 (Two) Times a Day., Disp: 100 each, Rfl: 1  •  lisinopril (PRINIVIL,ZESTRIL) 20 MG tablet, Take 20 mg by mouth Daily., Disp: , Rfl:   •  rosuvastatin (CRESTOR) 10 MG tablet, Take one daily (Patient taking differently: Take 10 mg by mouth Every Night.), Disp: , Rfl:     Allergies   Allergen Reactions   • Promethazine Mental Status Change   • Tape Other (See Comments)     .blisters         Family History   Problem Relation Age of Onset   • Lymphoma Mother    • Heart disease Mother    • Stroke Mother    • Cancer Mother    • Other Father    • Diabetes Sister    • Thyroid disease Sister    • No Known Problems Brother    • No Known Problems Brother    • Diabetes type I Half-Sister    • Thyroid cancer Half-Sister    • Cancer Maternal Aunt        Cancer-related family history includes Cancer in her maternal aunt and mother; Lymphoma in her mother; Thyroid cancer in her half-sister.    Social History     Tobacco Use   • Smoking status: Never Smoker   • Smokeless tobacco: Never Used   Substance Use Topics   • Alcohol use: Yes     Alcohol/week: 1.0 standard drinks     Types: 1 Glasses of wine per week     Frequency: Monthly or less     Comment: rare   • Drug use: Not Currently       HPI, ROS and PFSH have been reviewed and confirmed on 12/14/2020.     SUBJECTIVE:          REVIEW OF SYSTEMS:  Review of Systems   Constitutional: Negative for chills and fever.   HENT: Negative for ear pain, mouth sores, nosebleeds and sore throat.    Eyes: Negative for photophobia and visual disturbance.   Respiratory: Negative for wheezing and stridor.    Cardiovascular: Negative for chest pain and palpitations.   Gastrointestinal: Negative for abdominal pain, diarrhea, nausea and vomiting.   Endocrine: Negative for cold intolerance and heat intolerance.   Genitourinary: Negative for dysuria and hematuria.   Musculoskeletal: Positive for arthralgias. Negative for joint swelling and neck stiffness.        Neck pain   Skin: Negative for  "color change and rash.   Neurological: Negative for seizures and syncope.   Hematological: Negative for adenopathy.   Psychiatric/Behavioral: Negative for agitation, confusion and hallucinations.     OBJECTIVE:    Vitals:    12/14/20 1510   BP: 146/88   Pulse: 90   Resp: 18   Temp: 96.9 °F (36.1 °C)   TempSrc: Temporal   Weight: 76.2 kg (168 lb)   Height: 154.9 cm (61\")   PainSc:   5   PainLoc: Comment: neck, lower back     Body mass index is 31.74 kg/m².    ECOG  (1) Restricted in physically strenuous activity, ambulatory and able to do work of light nature    Physical Exam  Vitals signs and nursing note reviewed.   Constitutional:       General: She is not in acute distress.     Appearance: She is not diaphoretic.   HENT:      Head: Normocephalic and atraumatic.   Eyes:      General: No scleral icterus.        Right eye: No discharge.         Left eye: No discharge.      Conjunctiva/sclera: Conjunctivae normal.   Neck:      Musculoskeletal: Normal range of motion and neck supple.      Thyroid: No thyromegaly.   Cardiovascular:      Rate and Rhythm: Normal rate and regular rhythm.      Heart sounds: Normal heart sounds. No friction rub. No gallop.    Pulmonary:      Effort: Pulmonary effort is normal. No respiratory distress.      Breath sounds: No stridor. No wheezing.   Abdominal:      General: Bowel sounds are normal.      Palpations: Abdomen is soft. There is no mass.      Tenderness: There is no abdominal tenderness. There is no guarding or rebound.   Musculoskeletal: Normal range of motion.         General: No tenderness.      Comments: Neck pain.  Patient has a neck collar.   Lymphadenopathy:      Cervical: No cervical adenopathy.   Skin:     General: Skin is warm.      Findings: No erythema or rash.   Neurological:      Mental Status: She is alert and oriented to person, place, and time.      Motor: No abnormal muscle tone.   Psychiatric:         Behavior: Behavior normal.         RECENT LABS  WBC   Date " Value Ref Range Status   12/14/2020 6.29 3.40 - 10.80 10*3/mm3 Final     RBC   Date Value Ref Range Status   12/14/2020 4.81 3.77 - 5.28 10*6/mm3 Final     Hemoglobin   Date Value Ref Range Status   12/14/2020 13.4 12.0 - 15.9 g/dL Final     Hematocrit   Date Value Ref Range Status   12/14/2020 42.7 34.0 - 46.6 % Final     MCV   Date Value Ref Range Status   12/14/2020 88.8 79.0 - 97.0 fL Final     MCH   Date Value Ref Range Status   12/14/2020 27.9 26.6 - 33.0 pg Final     MCHC   Date Value Ref Range Status   12/14/2020 31.4 (L) 31.5 - 35.7 g/dL Final     RDW   Date Value Ref Range Status   12/14/2020 13.8 12.3 - 15.4 % Final     RDW-SD   Date Value Ref Range Status   12/14/2020 43.7 37.0 - 54.0 fl Final     MPV   Date Value Ref Range Status   12/14/2020 10.1 6.0 - 12.0 fL Final     Platelets   Date Value Ref Range Status   12/14/2020 110 (L) 140 - 450 10*3/mm3 Final     Neutrophil %   Date Value Ref Range Status   12/14/2020 67.4 42.7 - 76.0 % Final     Lymphocyte %   Date Value Ref Range Status   12/14/2020 24.6 19.6 - 45.3 % Final     Monocyte %   Date Value Ref Range Status   12/14/2020 6.2 5.0 - 12.0 % Final     Eosinophil %   Date Value Ref Range Status   12/14/2020 1.3 0.3 - 6.2 % Final     Basophil %   Date Value Ref Range Status   12/14/2020 0.5 0.0 - 1.5 % Final     Immature Grans %   Date Value Ref Range Status   07/20/2020 0.7 (H) 0.0 - 0.5 % Final     Neutrophils, Absolute   Date Value Ref Range Status   12/14/2020 4.24 1.70 - 7.00 10*3/mm3 Final     Lymphocytes, Absolute   Date Value Ref Range Status   12/14/2020 1.55 0.70 - 3.10 10*3/mm3 Final     Monocytes, Absolute   Date Value Ref Range Status   12/14/2020 0.39 0.10 - 0.90 10*3/mm3 Final     Eosinophils, Absolute   Date Value Ref Range Status   12/14/2020 0.08 0.00 - 0.40 10*3/mm3 Final     Basophils, Absolute   Date Value Ref Range Status   12/14/2020 0.03 0.00 - 0.20 10*3/mm3 Final     Immature Grans, Absolute   Date Value Ref Range Status    07/20/2020 0.05 0.00 - 0.05 10*3/mm3 Final     [facility] [place]   Date Value Ref Range Status   11/03/2020 0.0 0.0 - 0.2 /100 WBC Final       Lab Results   Component Value Date    GLUCOSE 260 (H) 11/16/2020    BUN 18 11/16/2020    CREATININE 0.70 11/16/2020    EGFRIFNONA 86 11/16/2020    EGFRIFAFRI >60 10/12/2018    BCR 25.7 (H) 11/16/2020    K 4.4 11/16/2020    CO2 25.0 11/16/2020    CALCIUM 9.6 11/16/2020    ALBUMIN 4.20 11/16/2020    AST 22 11/16/2020    ALT 16 11/16/2020         Assessment/Plan     Malignant neoplasm of female breast, unspecified estrogen receptor status, unspecified laterality, unspecified site of breast (CMS/HCC)  - CBC & Differential  - Methylmalonic Acid, Serum  - CBC & Differential  - DEYVI  - Protein Elec + Interp, Serum  - Reticulocytes  - Haptoglobin  - Folate  - Vitamin B12  - Lactate Dehydrogenase  - aPTT  - Protime-INR  - Fibrinogen    Post-menopausal  - DEXA Bone Density Axial    Thrombocytopenia (CMS/HCC)  - Methylmalonic Acid, Serum  - CBC & Differential  - DEYVI  - Protein Elec + Interp, Serum  - Reticulocytes  - Haptoglobin  - Folate  - Vitamin B12  - Lactate Dehydrogenase  - aPTT  - Protime-INR  - Fibrinogen      ASSESSMENT:    · ypT2 N1 aM0 status post left modified radical mastectomy with lymph node dissection and right prophylactic mastectomy in 2017 performed at [facility].  ER positive, SC positive and HER-2/bishop negative.  Status post bilateral chest wall reconstruction.  According to patient, she tolerated Arimidex very well except that she also had osteoporosis therefore Arimidex was discontinued.  · Intolerance of tamoxifen  · Status post neoadjuvant Arimidex prior to bilateral mastectomies  · Thrombocytopenia  · Neck pain status post cervical spine surgery: Patient has a soft neck collar  · Complex cardiac medical history including tetralogy of Fallot, coarctation of aorta, VSD status post repair.  Status post stent placement for coarctation of aorta  · Personal  history and strong family history of breast cancer in multiple in the relatives on paternal side of the family including 4 paternal aunts in their 30s and 40s and 2 maternal aunts at age of 20s and 50s.  There is concern for possible hereditary breast cancer syndrome.  Patient may be  interested in pursuing cancer genetics for her management.        Discussion     Reviewed her medical history, and clinical labs.  Patient will benefit from aromatase inhibitor/antiestrogen therapy for her stage II breast cancer.  Chemotherapy was not offered due to low Oncotype DX assay.  Patient is now willing to try antiestrogen therapy for her disease        Plans:     · Schedule bone density  as soon as possible  · Thrombocytopenia work-up  · Would consider restarting Arimidex 1 mg p.o. daily.  Patient had tolerated Arimidex in the past but this was discontinued due to osteoporosis.  She cannot tolerate tamoxifen.  Explained to patient that we can still treat her with Arimidex  So long as osteoporosis is controlled with medication such as Prolia.  She is willing to retry Arimidex  · Follow-up 4 weeks to finalize decision and review her labs for thrombocytopenia  · Patient to follow-up with our spine surgeons           I have reviewed labs results, imaging, vitals, and medications with the patient today.  Will follow up in 1 month with me.        Patient verbalized understanding and is in agreement of the above plan.               Thank you very much for allowing me to participate in the care of Gladys, I will keep you updated on her progress

## 2020-12-14 ENCOUNTER — TELEPHONE (OUTPATIENT)
Dept: SPEECH THERAPY | Facility: HOSPITAL | Age: 60
End: 2020-12-14

## 2020-12-14 ENCOUNTER — LAB (OUTPATIENT)
Dept: LAB | Facility: HOSPITAL | Age: 60
End: 2020-12-14

## 2020-12-14 ENCOUNTER — APPOINTMENT (OUTPATIENT)
Dept: SPEECH THERAPY | Facility: HOSPITAL | Age: 60
End: 2020-12-14

## 2020-12-14 ENCOUNTER — OFFICE VISIT (OUTPATIENT)
Dept: ONCOLOGY | Facility: CLINIC | Age: 60
End: 2020-12-14

## 2020-12-14 VITALS
WEIGHT: 168 LBS | DIASTOLIC BLOOD PRESSURE: 88 MMHG | RESPIRATION RATE: 18 BRPM | BODY MASS INDEX: 31.72 KG/M2 | HEART RATE: 90 BPM | TEMPERATURE: 96.9 F | HEIGHT: 61 IN | SYSTOLIC BLOOD PRESSURE: 146 MMHG

## 2020-12-14 DIAGNOSIS — D69.6 THROMBOCYTOPENIA (HCC): ICD-10-CM

## 2020-12-14 DIAGNOSIS — Z78.0 POST-MENOPAUSAL: ICD-10-CM

## 2020-12-14 DIAGNOSIS — C50.919 MALIGNANT NEOPLASM OF FEMALE BREAST, UNSPECIFIED ESTROGEN RECEPTOR STATUS, UNSPECIFIED LATERALITY, UNSPECIFIED SITE OF BREAST (HCC): ICD-10-CM

## 2020-12-14 DIAGNOSIS — C50.919 MALIGNANT NEOPLASM OF FEMALE BREAST, UNSPECIFIED ESTROGEN RECEPTOR STATUS, UNSPECIFIED LATERALITY, UNSPECIFIED SITE OF BREAST (HCC): Primary | ICD-10-CM

## 2020-12-14 LAB
APTT PPP: 26.2 SECONDS (ref 24–31)
BASOPHILS # BLD AUTO: 0.03 10*3/MM3 (ref 0–0.2)
BASOPHILS NFR BLD AUTO: 0.5 % (ref 0–1.5)
DEPRECATED RDW RBC AUTO: 43.7 FL (ref 37–54)
EOSINOPHIL # BLD AUTO: 0.08 10*3/MM3 (ref 0–0.4)
EOSINOPHIL NFR BLD AUTO: 1.3 % (ref 0.3–6.2)
ERYTHROCYTE [DISTWIDTH] IN BLOOD BY AUTOMATED COUNT: 13.8 % (ref 12.3–15.4)
FIBRINOGEN PPP-MCNC: 412 MG/DL (ref 210–450)
HCT VFR BLD AUTO: 42.7 % (ref 34–46.6)
HGB BLD-MCNC: 13.4 G/DL (ref 12–15.9)
INR PPP: 0.99 (ref 0.93–1.1)
LYMPHOCYTES # BLD AUTO: 1.55 10*3/MM3 (ref 0.7–3.1)
LYMPHOCYTES NFR BLD AUTO: 24.6 % (ref 19.6–45.3)
MCH RBC QN AUTO: 27.9 PG (ref 26.6–33)
MCHC RBC AUTO-ENTMCNC: 31.4 G/DL (ref 31.5–35.7)
MCV RBC AUTO: 88.8 FL (ref 79–97)
MONOCYTES # BLD AUTO: 0.39 10*3/MM3 (ref 0.1–0.9)
MONOCYTES NFR BLD AUTO: 6.2 % (ref 5–12)
NEUTROPHILS NFR BLD AUTO: 4.24 10*3/MM3 (ref 1.7–7)
NEUTROPHILS NFR BLD AUTO: 67.4 % (ref 42.7–76)
PLATELET # BLD AUTO: 110 10*3/MM3 (ref 140–450)
PMV BLD AUTO: 10.1 FL (ref 6–12)
PROTHROMBIN TIME: 10.9 SECONDS (ref 9.6–11.7)
RBC # BLD AUTO: 4.81 10*6/MM3 (ref 3.77–5.28)
RETICS # AUTO: 0.09 10*6/MM3 (ref 0.02–0.13)
RETICS/RBC NFR AUTO: 1.78 % (ref 0.7–1.9)
WBC # BLD AUTO: 6.29 10*3/MM3 (ref 3.4–10.8)

## 2020-12-14 PROCEDURE — 83010 ASSAY OF HAPTOGLOBIN QUANT: CPT | Performed by: INTERNAL MEDICINE

## 2020-12-14 PROCEDURE — 85730 THROMBOPLASTIN TIME PARTIAL: CPT | Performed by: INTERNAL MEDICINE

## 2020-12-14 PROCEDURE — 85610 PROTHROMBIN TIME: CPT | Performed by: INTERNAL MEDICINE

## 2020-12-14 PROCEDURE — 82746 ASSAY OF FOLIC ACID SERUM: CPT | Performed by: INTERNAL MEDICINE

## 2020-12-14 PROCEDURE — 85384 FIBRINOGEN ACTIVITY: CPT | Performed by: INTERNAL MEDICINE

## 2020-12-14 PROCEDURE — 86038 ANTINUCLEAR ANTIBODIES: CPT | Performed by: INTERNAL MEDICINE

## 2020-12-14 PROCEDURE — 85025 COMPLETE CBC W/AUTO DIFF WBC: CPT

## 2020-12-14 PROCEDURE — 85045 AUTOMATED RETICULOCYTE COUNT: CPT | Performed by: INTERNAL MEDICINE

## 2020-12-14 PROCEDURE — 99215 OFFICE O/P EST HI 40 MIN: CPT | Performed by: INTERNAL MEDICINE

## 2020-12-14 PROCEDURE — 36415 COLL VENOUS BLD VENIPUNCTURE: CPT | Performed by: INTERNAL MEDICINE

## 2020-12-14 PROCEDURE — 82607 VITAMIN B-12: CPT | Performed by: INTERNAL MEDICINE

## 2020-12-14 PROCEDURE — 83615 LACTATE (LD) (LDH) ENZYME: CPT | Performed by: INTERNAL MEDICINE

## 2020-12-15 LAB
ALBUMIN SERPL ELPH-MCNC: 3.6 G/DL (ref 2.9–4.4)
ALBUMIN/GLOB SERPL: 0.9 {RATIO} (ref 0.7–1.7)
ALPHA1 GLOB SERPL ELPH-MCNC: 0.3 G/DL (ref 0–0.4)
ALPHA2 GLOB SERPL ELPH-MCNC: 0.9 G/DL (ref 0.4–1)
B-GLOBULIN SERPL ELPH-MCNC: 1.1 G/DL (ref 0.7–1.3)
FOLATE SERPL-MCNC: 15 NG/ML (ref 4.78–24.2)
GAMMA GLOB SERPL ELPH-MCNC: 1.4 G/DL (ref 0.4–1.8)
GLOBULIN SER CALC-MCNC: 3.8 G/DL (ref 2.2–3.9)
HAPTOGLOB SERPL-MCNC: 79 MG/DL (ref 30–200)
LABORATORY COMMENT REPORT: NORMAL
LDH SERPL-CCNC: 273 U/L (ref 135–214)
M PROTEIN SERPL ELPH-MCNC: NORMAL G/DL
PROT PATTERN SERPL ELPH-IMP: NORMAL
PROT SERPL-MCNC: 7.4 G/DL (ref 6–8.5)
VIT B12 BLD-MCNC: 536 PG/ML (ref 211–946)

## 2020-12-16 LAB — ANA SER QL: NEGATIVE

## 2020-12-18 LAB
Lab: NORMAL
METHYLMALONATE SERPL-SCNC: 181 NMOL/L (ref 0–378)

## 2020-12-21 ENCOUNTER — TREATMENT (OUTPATIENT)
Dept: PHYSICAL THERAPY | Facility: CLINIC | Age: 60
End: 2020-12-21

## 2020-12-21 DIAGNOSIS — M54.2 PAIN, NECK: ICD-10-CM

## 2020-12-21 DIAGNOSIS — M47.12 CERVICAL SPONDYLOSIS WITH MYELOPATHY: Primary | ICD-10-CM

## 2020-12-21 PROCEDURE — 97110 THERAPEUTIC EXERCISES: CPT | Performed by: PHYSICAL THERAPIST

## 2020-12-21 PROCEDURE — 97112 NEUROMUSCULAR REEDUCATION: CPT | Performed by: PHYSICAL THERAPIST

## 2020-12-21 PROCEDURE — 97530 THERAPEUTIC ACTIVITIES: CPT | Performed by: PHYSICAL THERAPIST

## 2020-12-21 NOTE — PROGRESS NOTES
Re-Assessment / Re-Certification        Patient: Gladys Garrison   : 1960  Diagnosis/ICD-10 Code:  Cervical spondylosis with myelopathy [M47.12]  Referring practitioner: Nai Barriga PA-C  Date of Initial Visit: Type: THERAPY  Noted: 2020  Today's Date: 2020  Patient seen for 3 sessions      Subjective:   Gladys Garrison reports: Patient presents wearing neck brace. Says 2 screws haven't fused yet so she has to continue to wear it. She is wearing soft collar today, only has to wear hard collar when driving. Saw NP on 20 and instructed her to wear collar for 10 weeks. 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 Is also having a lot of low back pain and trying to get epidural injections. Currently having low back pain and upper back/shoulder pain when raise her arms. 5/10 currently.     Subjective Questionnaire: NDI: 60%    Clinical Progress: unchanged  Home Program Compliance: Yes  Treatment has included: therapeutic exercise, neuromuscular re-education and therapeutic activity    Subjective   Objective          Postural Observations  Seated posture: fair  Standing posture: fair    Additional Postural Observation Details  Scoliosis, significant upper thoracic kyphosis, right rib hump    Palpation   Left   Hypertonic in the upper trapezius.   Tenderness of the upper trapezius.     Right   Hypertonic in the upper trapezius. Tenderness of the upper trapezius.     Neurological Testing     Sensation   Cervical/Thoracic   Left   Intact: light touch    Right   Intact: light touch  Paresthesia: light touch    Comments   Right light touch: n/T right hand.     Lumbar   Left   Intact: light touch    Right   Intact: light touch    Active Range of Motion     Additional Active Range of Motion Details  Cervical AROM not taken today due to cervical precautions.     Shoulder AROM: RUE flexion: 140 and pain in neck                              LUE flexion: 123 and pain in lower back and neck  Lumbar  AROM:  Flexion: moderate limitation and pain (around T12)  Extension: severe limitation and pain (around T12)    Passive Range of Motion     Additional Passive Range of Motion Details  Bilateral hips: flexion: 100 deg and painful in low back                         IR: 5 deg and painful in hips    Strength/Myotome Testing     Left Hip   Planes of Motion   Flexion: 4  Abduction: 4  Adduction: 4    Right Hip   Planes of Motion   Flexion: 4  Abduction: 4  Adduction: 4    Left Ankle/Foot   Dorsiflexion: 5    Right Ankle/Foot   Dorsiflexion: 5    Additional Strength Details  LUE : 20 lbs  RUE : 8 lbs     Tests     Lumbar     Left   Negative passive SLR.     Right   Positive passive SLR.     Left Pelvic Girdle/Sacrum   Negative: sacrum compression and gapping.     Right Pelvic Girdle/Sacrum   Negative: sacrum compression and gapping.       Assessment & Plan     Assessment  Impairments: abnormal or restricted ROM, activity intolerance, impaired physical strength, lacks appropriate home exercise program and pain with function  Assessment details: The patient is a 60 y.o. female who presents to physical therapy for neck and UE pain and dysfunction following cervical spine fusion on 9/22/20 as well as mid and low back pain with hx of T9 and T12. The patient has only been seen for 1 visit since the initial evaluation due to her personal illness. She demonstrates improved shoulder ROM but  is actually worse since initial evaluation. The patient demonstrates the following impairments: UE weakness,  strength, decreased cervical and UE ROM, postural impairments, decreased lumbar and thoracic ROM, impaired sensation at times, decreased hip ROM, hip and LE generalized weakness, and vertigo. Due to these impairments, the patient is unable to perform or has difficulty with the following functional tasks: difficulty with ADL and home care activities, trouble driving, UE weakness and dropping items, inability to turn her  head without pain. The patient would benefit from skilled PT services to address functional limitations and impairments and to improve patient quality of life.      Prognosis: fair    Goals  Plan Goals: STG:  Pt will be independent and compliant with initial HEP in 3 weeks. MET  Pt will report a 25% improvement in neck and UE symptoms since starting therapy in 3 weeks. NOT MET  Pt will report a 25% improvement in mid and lower back symptoms since starting therapy in 3 weeks. NOT MET  Pt will report pain level at worst <6 during functional activity in 3 weeks. NOT MET  Pt will be independent and compliant with surgical precautions including wearing neck brace in 1 week. MET  LTG:  Pt will be independent with final HEP for self-management of condition by DC. NOT MET  Pt will improve score on NDI to less than 20% by DC. NOT MET  Pt will report a 50% improvement in symptoms by DC in order to allow return to PLOF. NOT MET  Pt will increase right  strength to at least 30 lbs by DC. NOT MET  Pt will demonstrate lumbar AROM without pain by DC. NOT MET  Pt will demonstrate cervical AROM with minimal pain by DC. NOT MET      Plan  Therapy options: will be seen for skilled physical therapy services  Planned modality interventions: cryotherapy, electrical stimulation/Russian stimulation, TENS, thermotherapy (hydrocollator packs), traction, ultrasound and dry needling  Planned therapy interventions: body mechanics training, flexibility, functional ROM exercises, home exercise program, joint mobilization, manual therapy, motor coordination training, neuromuscular re-education, postural training, soft tissue mobilization, spinal/joint mobilization, strengthening, stretching and therapeutic activities  Frequency: 2x week  Duration in visits: 20  Treatment plan discussed with: patient  Plan details: Treating neck and back symptoms.       Progress toward previous goals: Not Met        Recommendations: Continue as  planned  Timeframe: 3 months  Prognosis to achieve goals: good    PT Signature: Beth Schmidt PT      Based upon review of the patient's progress and continued therapy plan, it is my medical opinion that Gladys Garrison should continue physical therapy treatment [place] [facility] [address] [facility]  [address] [facility] [place] [id number].    Signature: __________________________________  Nai Barriga PA-C  Please sign and return via fax to [phone].. Thank you, [facility].    Timed:         Manual Therapy:         mins  55516;     Therapeutic Exercise:    20     mins  10246;     Neuromuscular Laura:    15    mins  88742;    Therapeutic Activity:     10     mins  17796;     Gait Training:           mins  38956;     Ultrasound:          mins  41548;    Ionto                                   mins   59139  Self Care                            mins   93267      Un-Timed:  Electrical Stimulation:         mins  72757 ( );  Dry Needling          mins self-pay  Traction          mins 76963  Low Eval          Mins  20795  Mod Eval          Mins  32557  High Eval                            Mins  54866  Re-Eval                               mins  58818      Timed Treatment:   45   mins   Total Treatment:     45   mins

## 2020-12-21 NOTE — PROGRESS NOTES
Physical Therapy Daily Progress Note    Patient: Gladys Garrison  : 1960  Referring practitioner: Nai Barriga PA-C  Today's Date: 2020    VISIT#: 3    Subjective   Gladys Garrison reports: Patient presents wearing neck brace. Says 2 screws haven't fused yet so she has to continue to wear it. She is wearing soft collar today, only has to wear hard collar when driving. Saw NP on 20 and instructed her to wear collar for 10 weeks. 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 Is also having a lot of low back pain and trying to get epidural injections. Currently having low back pain and upper back/shoulder pain when raise her arms. 5/10 currently.       Objective          Postural Observations  Seated posture: fair  Standing posture: fair    Additional Postural Observation Details  Scoliosis, significant upper thoracic kyphosis, right rib hump    Palpation   Left   Hypertonic in the upper trapezius.   Tenderness of the upper trapezius.     Right   Hypertonic in the upper trapezius. Tenderness of the upper trapezius.     Neurological Testing     Sensation   Cervical/Thoracic   Left   Intact: light touch    Right   Intact: light touch  Paresthesia: light touch    Comments   Right light touch: n/T right hand.     Lumbar   Left   Intact: light touch    Right   Intact: light touch    Active Range of Motion     Additional Active Range of Motion Details  Cervical AROM not taken today due to cervical precautions.     Shoulder AROM: RUE flexion: 140 and pain in neck                              LUE flexion: 128 and pain in lower back and neck  Lumbar AROM:  Flexion: moderate limitation and pain (around T12)  Extension: severe limitation and pain (around T12)    Passive Range of Motion     Additional Passive Range of Motion Details  Bilateral hips: flexion: 100 deg and painful in low back                         IR: 5 deg and painful in hips    Strength/Myotome Testing     Left Hip   Planes of Motion    Flexion: 4  Abduction: 4  Adduction: 4    Right Hip   Planes of Motion   Flexion: 4  Abduction: 4  Adduction: 4    Left Ankle/Foot   Dorsiflexion: 5    Right Ankle/Foot   Dorsiflexion: 5    Additional Strength Details  LUE : 20 lbs  RUE : 8 lbs     Tests     Lumbar     Left   Negative passive SLR.     Right   Positive passive SLR.     Left Pelvic Girdle/Sacrum   Negative: sacrum compression and gapping.     Right Pelvic Girdle/Sacrum   Negative: sacrum compression and gapping.         See Exercise, Manual, and Modality Logs for complete treatment.     Patient Education:    Assessment & Plan       Goals  Plan Goals: STG:  Pt will be independent and compliant with initial HEP in 3 weeks.  Pt will report a 25% improvement in neck and UE symptoms since starting therapy in 3 weeks.  Pt will report a 25% improvement in mid and lower back symptoms since starting therapy in 3 weeks.  Pt will report pain level at worst <6 during functional activity in 3 weeks.  Pt will be independent and compliant with surgical precautions including wearing neck brace in 1 week.   LTG:  Pt will be independent with final HEP for self-management of condition by DC.  Pt will improve score on NDI to less than 20% by DC.   Pt will report a 50% improvement in symptoms by DC in order to allow return to PLOF.  Pt will increase right  strength to at least 30 lbs by DC.   Pt will demonstrate lumbar AROM without pain by DC.   Pt will demonstrate cervical AROM with minimal pain by DC.          {AMB PT PLAN (SOAP Note):59196}            Timed:         Manual Therapy:    ***     mins  12022;     Therapeutic Exercise:    ***     mins  67157;     Neuromuscular Laura:    ***    mins  59419;    Therapeutic Activity:     ***     mins  16493;     Gait Training:      ***     mins  00180;     Ultrasound:     ***     mins  87673;    Ionto:                               ***    mins   94604  Self Care:                       ***     mins    28961    Un-Timed:  Electrical Stimulation:    ***     mins  29699 ( );  Dry Needling     ***     mins self-pay  Traction     ***     mins 34723  Re-Eval                           ***    mins  41961    Timed Treatment:   ***   mins   Total Treatment:     ***   mins    Beth Schmidt, PT  Physical Therapist

## 2021-01-04 ENCOUNTER — TELEPHONE (OUTPATIENT)
Dept: NEUROSURGERY | Facility: CLINIC | Age: 61
End: 2021-01-04

## 2021-01-11 ENCOUNTER — TELEPHONE (OUTPATIENT)
Dept: PHYSICAL THERAPY | Facility: CLINIC | Age: 61
End: 2021-01-11

## 2021-01-15 PROCEDURE — U0003 INFECTIOUS AGENT DETECTION BY NUCLEIC ACID (DNA OR RNA); SEVERE ACUTE RESPIRATORY SYNDROME CORONAVIRUS 2 (SARS-COV-2) (CORONAVIRUS DISEASE [COVID-19]), AMPLIFIED PROBE TECHNIQUE, MAKING USE OF HIGH THROUGHPUT TECHNOLOGIES AS DESCRIBED BY CMS-2020-01-R: HCPCS | Performed by: NURSE PRACTITIONER

## 2021-01-18 ENCOUNTER — APPOINTMENT (OUTPATIENT)
Dept: LAB | Facility: HOSPITAL | Age: 61
End: 2021-01-18

## 2021-01-29 ENCOUNTER — HOSPITAL ENCOUNTER (OUTPATIENT)
Dept: CT IMAGING | Facility: HOSPITAL | Age: 61
Discharge: HOME | End: 2021-01-29

## 2021-01-29 ENCOUNTER — HOSPITAL ENCOUNTER (OUTPATIENT)
Dept: BONE DENSITY | Facility: HOSPITAL | Age: 61
Discharge: HOME | End: 2021-01-29

## 2021-01-29 DIAGNOSIS — Z98.1 S/P CERVICAL SPINAL FUSION: ICD-10-CM

## 2021-01-29 DIAGNOSIS — Z78.0 POST-MENOPAUSAL: ICD-10-CM

## 2021-01-29 PROCEDURE — 72125 CT NECK SPINE W/O DYE: CPT

## 2021-01-29 PROCEDURE — 77080 DXA BONE DENSITY AXIAL: CPT

## 2021-02-04 ENCOUNTER — APPOINTMENT (OUTPATIENT)
Dept: GENERAL RADIOLOGY | Facility: HOSPITAL | Age: 61
End: 2021-02-04

## 2021-02-04 ENCOUNTER — APPOINTMENT (OUTPATIENT)
Dept: CT IMAGING | Facility: HOSPITAL | Age: 61
End: 2021-02-04

## 2021-02-04 ENCOUNTER — HOSPITAL ENCOUNTER (OUTPATIENT)
Facility: HOSPITAL | Age: 61
Setting detail: OBSERVATION
Discharge: HOME | End: 2021-02-05
Attending: INTERNAL MEDICINE | Admitting: INTERNAL MEDICINE

## 2021-02-04 ENCOUNTER — TELEPHONE (OUTPATIENT)
Dept: ONCOLOGY | Facility: CLINIC | Age: 61
End: 2021-02-04

## 2021-02-04 DIAGNOSIS — R07.9 CHEST PAIN, UNSPECIFIED TYPE: Primary | ICD-10-CM

## 2021-02-04 DIAGNOSIS — M54.6 ACUTE RIGHT-SIDED THORACIC BACK PAIN: ICD-10-CM

## 2021-02-04 LAB
ALBUMIN SERPL-MCNC: 4.5 G/DL (ref 3.5–5.2)
ALBUMIN/GLOB SERPL: 1.3 G/DL
ALP SERPL-CCNC: 115 U/L (ref 39–117)
ALT SERPL W P-5'-P-CCNC: 18 U/L (ref 1–33)
ANION GAP SERPL CALCULATED.3IONS-SCNC: 11 MMOL/L (ref 5–15)
AST SERPL-CCNC: 22 U/L (ref 1–32)
BASOPHILS # BLD AUTO: 0 10*3/MM3 (ref 0–0.2)
BASOPHILS NFR BLD AUTO: 0.2 % (ref 0–1.5)
BILIRUB SERPL-MCNC: 0.3 MG/DL (ref 0–1.2)
BUN SERPL-MCNC: 20 MG/DL (ref 6–20)
BUN/CREAT SERPL: 24.7 (ref 7–25)
CALCIUM SPEC-SCNC: 9.4 MG/DL (ref 8.6–10.5)
CHLORIDE SERPL-SCNC: 102 MMOL/L (ref 98–107)
CO2 SERPL-SCNC: 22 MMOL/L (ref 22–29)
CREAT SERPL-MCNC: 0.81 MG/DL (ref 0.57–1)
D DIMER PPP FEU-MCNC: 0.81 MG/L (FEU) (ref 0–0.59)
DEPRECATED RDW RBC AUTO: 38.9 FL (ref 37–54)
EOSINOPHIL # BLD AUTO: 0 10*3/MM3 (ref 0–0.4)
EOSINOPHIL NFR BLD AUTO: 0.3 % (ref 0.3–6.2)
ERYTHROCYTE [DISTWIDTH] IN BLOOD BY AUTOMATED COUNT: 13.3 % (ref 12.3–15.4)
GFR SERPL CREATININE-BSD FRML MDRD: 73 ML/MIN/1.73
GLOBULIN UR ELPH-MCNC: 3.6 GM/DL
GLUCOSE BLDC GLUCOMTR-MCNC: 177 MG/DL (ref 70–105)
GLUCOSE SERPL-MCNC: 261 MG/DL (ref 65–99)
HCT VFR BLD AUTO: 40.3 % (ref 34–46.6)
HGB BLD-MCNC: 13.7 G/DL (ref 12–15.9)
HOLD SPECIMEN: NORMAL
LYMPHOCYTES # BLD AUTO: 1.6 10*3/MM3 (ref 0.7–3.1)
LYMPHOCYTES NFR BLD AUTO: 14.8 % (ref 19.6–45.3)
MCH RBC QN AUTO: 27.9 PG (ref 26.6–33)
MCHC RBC AUTO-ENTMCNC: 33.9 G/DL (ref 31.5–35.7)
MCV RBC AUTO: 82.4 FL (ref 79–97)
MONOCYTES # BLD AUTO: 0.7 10*3/MM3 (ref 0.1–0.9)
MONOCYTES NFR BLD AUTO: 6 % (ref 5–12)
NEUTROPHILS NFR BLD AUTO: 78.7 % (ref 42.7–76)
NEUTROPHILS NFR BLD AUTO: 8.5 10*3/MM3 (ref 1.7–7)
NRBC BLD AUTO-RTO: 0.1 /100 WBC (ref 0–0.2)
NT-PROBNP SERPL-MCNC: 346.5 PG/ML (ref 0–900)
PLATELET # BLD AUTO: 165 10*3/MM3 (ref 140–450)
PMV BLD AUTO: 7.7 FL (ref 6–12)
POTASSIUM SERPL-SCNC: 4.4 MMOL/L (ref 3.5–5.2)
PROT SERPL-MCNC: 8.1 G/DL (ref 6–8.5)
RBC # BLD AUTO: 4.9 10*6/MM3 (ref 3.77–5.28)
SODIUM SERPL-SCNC: 135 MMOL/L (ref 136–145)
TROPONIN T SERPL-MCNC: <0.01 NG/ML (ref 0–0.03)
TROPONIN T SERPL-MCNC: <0.01 NG/ML (ref 0–0.03)
WBC # BLD AUTO: 10.8 10*3/MM3 (ref 3.4–10.8)

## 2021-02-04 PROCEDURE — 83880 ASSAY OF NATRIURETIC PEPTIDE: CPT | Performed by: PHYSICIAN ASSISTANT

## 2021-02-04 PROCEDURE — C9803 HOPD COVID-19 SPEC COLLECT: HCPCS

## 2021-02-04 PROCEDURE — 93005 ELECTROCARDIOGRAM TRACING: CPT | Performed by: PHYSICIAN ASSISTANT

## 2021-02-04 PROCEDURE — 71275 CT ANGIOGRAPHY CHEST: CPT

## 2021-02-04 PROCEDURE — 85379 FIBRIN DEGRADATION QUANT: CPT | Performed by: PHYSICIAN ASSISTANT

## 2021-02-04 PROCEDURE — 84484 ASSAY OF TROPONIN QUANT: CPT | Performed by: PHYSICIAN ASSISTANT

## 2021-02-04 PROCEDURE — U0004 COV-19 TEST NON-CDC HGH THRU: HCPCS | Performed by: PHYSICIAN ASSISTANT

## 2021-02-04 PROCEDURE — 93005 ELECTROCARDIOGRAM TRACING: CPT | Performed by: INTERNAL MEDICINE

## 2021-02-04 PROCEDURE — 93005 ELECTROCARDIOGRAM TRACING: CPT

## 2021-02-04 PROCEDURE — 99220 PR INITIAL OBSERVATION CARE/DAY 70 MINUTES: CPT | Performed by: STUDENT IN AN ORGANIZED HEALTH CARE EDUCATION/TRAINING PROGRAM

## 2021-02-04 PROCEDURE — 96374 THER/PROPH/DIAG INJ IV PUSH: CPT

## 2021-02-04 PROCEDURE — 72072 X-RAY EXAM THORAC SPINE 3VWS: CPT

## 2021-02-04 PROCEDURE — G0378 HOSPITAL OBSERVATION PER HR: HCPCS

## 2021-02-04 PROCEDURE — 82962 GLUCOSE BLOOD TEST: CPT

## 2021-02-04 PROCEDURE — 85025 COMPLETE CBC W/AUTO DIFF WBC: CPT | Performed by: PHYSICIAN ASSISTANT

## 2021-02-04 PROCEDURE — 25010000002 KETOROLAC TROMETHAMINE PER 15 MG: Performed by: PHYSICIAN ASSISTANT

## 2021-02-04 PROCEDURE — 80053 COMPREHEN METABOLIC PANEL: CPT | Performed by: PHYSICIAN ASSISTANT

## 2021-02-04 PROCEDURE — 99284 EMERGENCY DEPT VISIT MOD MDM: CPT

## 2021-02-04 PROCEDURE — 0 IOPAMIDOL PER 1 ML: Performed by: PHYSICIAN ASSISTANT

## 2021-02-04 PROCEDURE — 71045 X-RAY EXAM CHEST 1 VIEW: CPT

## 2021-02-04 RX ORDER — SODIUM CHLORIDE 0.9 % (FLUSH) 0.9 %
10 SYRINGE (ML) INJECTION AS NEEDED
Status: DISCONTINUED | OUTPATIENT
Start: 2021-02-04 | End: 2021-02-05 | Stop reason: HOSPADM

## 2021-02-04 RX ORDER — SODIUM CHLORIDE 0.9 % (FLUSH) 0.9 %
10 SYRINGE (ML) INJECTION EVERY 12 HOURS SCHEDULED
Status: DISCONTINUED | OUTPATIENT
Start: 2021-02-04 | End: 2021-02-05 | Stop reason: HOSPADM

## 2021-02-04 RX ORDER — ASPIRIN 81 MG/1
324 TABLET, CHEWABLE ORAL ONCE
Status: COMPLETED | OUTPATIENT
Start: 2021-02-04 | End: 2021-02-04

## 2021-02-04 RX ORDER — INSULIN LISPRO 100 [IU]/ML
0-9 INJECTION, SOLUTION INTRAVENOUS; SUBCUTANEOUS AS NEEDED
Status: DISCONTINUED | OUTPATIENT
Start: 2021-02-04 | End: 2021-02-05 | Stop reason: HOSPADM

## 2021-02-04 RX ORDER — NICOTINE POLACRILEX 4 MG
15 LOZENGE BUCCAL
Status: DISCONTINUED | OUTPATIENT
Start: 2021-02-04 | End: 2021-02-05 | Stop reason: HOSPADM

## 2021-02-04 RX ORDER — HYDROCODONE BITARTRATE AND ACETAMINOPHEN 5; 325 MG/1; MG/1
1 TABLET ORAL ONCE AS NEEDED
Status: COMPLETED | OUTPATIENT
Start: 2021-02-04 | End: 2021-02-04

## 2021-02-04 RX ORDER — ONDANSETRON 4 MG/1
4 TABLET, FILM COATED ORAL EVERY 6 HOURS PRN
Status: DISCONTINUED | OUTPATIENT
Start: 2021-02-04 | End: 2021-02-05 | Stop reason: HOSPADM

## 2021-02-04 RX ORDER — KETOROLAC TROMETHAMINE 15 MG/ML
15 INJECTION, SOLUTION INTRAMUSCULAR; INTRAVENOUS ONCE
Status: COMPLETED | OUTPATIENT
Start: 2021-02-04 | End: 2021-02-04

## 2021-02-04 RX ORDER — LIDOCAINE 50 MG/G
1 PATCH TOPICAL ONCE
Status: COMPLETED | OUTPATIENT
Start: 2021-02-04 | End: 2021-02-05

## 2021-02-04 RX ORDER — NITROGLYCERIN 0.4 MG/1
0.4 TABLET SUBLINGUAL
Status: DISCONTINUED | OUTPATIENT
Start: 2021-02-04 | End: 2021-02-05 | Stop reason: HOSPADM

## 2021-02-04 RX ORDER — NITROGLYCERIN 0.4 MG/1
0.4 TABLET SUBLINGUAL
Status: DISCONTINUED | OUTPATIENT
Start: 2021-02-04 | End: 2021-02-04

## 2021-02-04 RX ORDER — ONDANSETRON 2 MG/ML
4 INJECTION INTRAMUSCULAR; INTRAVENOUS EVERY 6 HOURS PRN
Status: DISCONTINUED | OUTPATIENT
Start: 2021-02-04 | End: 2021-02-05 | Stop reason: HOSPADM

## 2021-02-04 RX ORDER — ACETAMINOPHEN 160 MG/5ML
650 SOLUTION ORAL EVERY 4 HOURS PRN
Status: DISCONTINUED | OUTPATIENT
Start: 2021-02-04 | End: 2021-02-05 | Stop reason: HOSPADM

## 2021-02-04 RX ORDER — INSULIN LISPRO 100 [IU]/ML
0-9 INJECTION, SOLUTION INTRAVENOUS; SUBCUTANEOUS
Status: DISCONTINUED | OUTPATIENT
Start: 2021-02-05 | End: 2021-02-05 | Stop reason: HOSPADM

## 2021-02-04 RX ORDER — DEXTROSE MONOHYDRATE 25 G/50ML
25 INJECTION, SOLUTION INTRAVENOUS
Status: DISCONTINUED | OUTPATIENT
Start: 2021-02-04 | End: 2021-02-05 | Stop reason: HOSPADM

## 2021-02-04 RX ORDER — METHOCARBAMOL 750 MG/1
750 TABLET, FILM COATED ORAL ONCE
Status: COMPLETED | OUTPATIENT
Start: 2021-02-04 | End: 2021-02-04

## 2021-02-04 RX ORDER — ACETAMINOPHEN 325 MG/1
650 TABLET ORAL EVERY 4 HOURS PRN
Status: DISCONTINUED | OUTPATIENT
Start: 2021-02-04 | End: 2021-02-05 | Stop reason: HOSPADM

## 2021-02-04 RX ORDER — CHOLECALCIFEROL (VITAMIN D3) 125 MCG
5 CAPSULE ORAL NIGHTLY PRN
Status: DISCONTINUED | OUTPATIENT
Start: 2021-02-04 | End: 2021-02-05 | Stop reason: HOSPADM

## 2021-02-04 RX ORDER — ACETAMINOPHEN 650 MG/1
650 SUPPOSITORY RECTAL EVERY 4 HOURS PRN
Status: DISCONTINUED | OUTPATIENT
Start: 2021-02-04 | End: 2021-02-05 | Stop reason: HOSPADM

## 2021-02-04 RX ADMIN — METHOCARBAMOL 750 MG: 750 TABLET, FILM COATED ORAL at 15:15

## 2021-02-04 RX ADMIN — ASPIRIN 324 MG: 81 TABLET, CHEWABLE ORAL at 17:20

## 2021-02-04 RX ADMIN — KETOROLAC TROMETHAMINE 15 MG: 15 INJECTION, SOLUTION INTRAMUSCULAR; INTRAVENOUS at 15:15

## 2021-02-04 RX ADMIN — LIDOCAINE 1 PATCH: 50 PATCH TOPICAL at 15:14

## 2021-02-04 RX ADMIN — HYDROCODONE BITARTRATE AND ACETAMINOPHEN 1 TABLET: 5; 325 TABLET ORAL at 17:20

## 2021-02-04 RX ADMIN — IOPAMIDOL 100 ML: 755 INJECTION, SOLUTION INTRAVENOUS at 20:53

## 2021-02-04 RX ADMIN — Medication 10 ML: at 23:57

## 2021-02-04 NOTE — ED PROVIDER NOTES
Subjective   Patient is a 60-year-old female who presents via private vehicle with complaints of acute on chronic mid back pain that started this morning.  Patient states she can usually take some over-the-counter pain medicine and IcyHot which usually relieves her pain but today it has not.  She currently describes as a constant sharp type pain just right of midline that is worse with certain movements and deep breaths better with rest.  She currently rates it a 6/10 in severity.  She denies any paresthesias numbness weakness of her upper or lower extremities.  She also denies any saddle anesthesia or bladder bowel incontinence.  She denies any new injury to the area.  Patient states that she has chronic back pain just seems more severe today.  Patient states she does have muscle relaxers but did not take 1 today for her pain as she had to work.  Patient also states about 30 minutes prior to arrival she started having some left-sided chest pain going into her left arm.  She does have chronic left pain which is seems somewhat similar.  States she did call her cardiologist Dr Leventhal who thought her chest pain could be secondary to the increase in her back pain but advised her she was coming to the ED to mention it to us.  Also denies any recent cough congestion fever recent travel or known sick contacts abdominal pain nausea or vomiting.          Review of Systems   Constitutional: Negative.    HENT: Negative.    Eyes: Negative for photophobia and visual disturbance.   Respiratory: Negative.    Cardiovascular: Positive for chest pain. Negative for palpitations and leg swelling.   Gastrointestinal: Negative for abdominal distention, abdominal pain, constipation, diarrhea, nausea and vomiting.   Genitourinary: Negative for decreased urine volume, difficulty urinating, dysuria, flank pain, frequency, hematuria and urgency.   Musculoskeletal: Positive for back pain. Negative for arthralgias, neck pain and neck  stiffness.   Skin: Negative.    Neurological: Negative.        Past Medical History:   Diagnosis Date   • Allergic    • Breast cancer (CMS/HCC) 2017    mets to lymph nodes; did not do radiation   • Cancer of unknown origin (CMS/HCC)    • Compression fx, thoracic spine, open, initial encounter (CMS/HCC)    • Coronary artery disease    • Diabetes mellitus (CMS/HCC)    • Heart disease, unspecified    • Hepatitis C    • Hypertension    • Obesity (BMI 30-39.9) 2021   • Sleep apnea     no machine   • Type 2 diabetes mellitus (CMS/HCC) 2017       Allergies   Allergen Reactions   • Promethazine Mental Status Change   • Tape Other (See Comments)     .blisters         Past Surgical History:   Procedure Laterality Date   • BACK SURGERY      neck   • BREAST RECONSTRUCTION Bilateral    • CARDIAC ABLATION       atrial tachycardia x 5 ablations    • CARDIAC CATHETERIZATION     • CARDIAC SURGERY      stent placed in aorta   • CARDIAC SURGERY      6 surgeries as baby    • CERVICAL FUSION ANTERIOR WITH ARTIFICIAL DISCECTOMY IMPLANTATION N/A 2020    Procedure: C4 VERTEBRECTOMY AND ANTERIOR CERIVCAL DISCECTOMY WITH FUSION OF CERVICAL THREE THROUGH FIVE WITH REMOVAL OF HARDWARE C5-C6;  Surgeon: Mark Kaba MD;  Location: [facility] MAIN OR;  Service: Neurosurgery;  Laterality: N/A;   •  SECTION      x2   • COLONOSCOPY N/A 10/23/2020    Procedure: COLONOSCOPY WITH POLYPECTOMY X6;  Surgeon: Stanley Bourne MD;  Location: [facility] ENDOSCOPY;  Service: Gastroenterology;  Laterality: N/A;  POLYPS, INTERNAL HEMORRHOIDS   • ENDOSCOPY N/A 10/23/2020    Procedure: ESOPHAGOGASTRODUODENOSCOPY WITH BIOPSY X 1 AREA;  Surgeon: Stanley Bourne MD;  Location: [facility] ENDOSCOPY;  Service: Gastroenterology;  Laterality: N/A;  GASTRITIS, ESOPHAGITIS, HIATAL HERNIA   • KNEE SURGERY     • MASTECTOMY Bilateral    • NECK SURGERY     • PACEMAKER IMPLANTATION         Family History   Problem Relation Age of Onset    • Lymphoma Mother    • Heart disease Mother    • Stroke Mother    • Cancer Mother    • Other Father    • Diabetes Sister    • Thyroid disease Sister    • No Known Problems Brother    • No Known Problems Brother    • Diabetes type I Half-Sister    • Thyroid cancer Half-Sister    • Cancer Maternal Aunt        Social History     Socioeconomic History   • Marital status:      Spouse name: Not on file   • Number of children: 2   • Years of education: Not on file   • Highest education level: Not on file   Occupational History     Employer: [employer]   Tobacco Use   • Smoking status: Never Smoker   • Smokeless tobacco: Never Used   Substance and Sexual Activity   • Alcohol use: Yes     Alcohol/week: 1.0 standard drinks     Types: 1 Glasses of wine per week     Frequency: Monthly or less     Comment: rare   • Drug use: Not Currently   • Sexual activity: Defer   Social History Narrative        So in: Lives with brother in law and     Lives 1/2 time in [place]           Objective   Physical Exam  Vitals signs and nursing note reviewed.   Constitutional:       General: She is not in acute distress.     Appearance: She is well-developed. She is not ill-appearing, toxic-appearing or diaphoretic.   HENT:      Head: Normocephalic and atraumatic.      Mouth/Throat:      Mouth: Mucous membranes are moist.      Pharynx: Oropharynx is clear.   Eyes:      General: No scleral icterus.     Extraocular Movements: Extraocular movements intact.      Pupils: Pupils are equal, round, and reactive to light.   Cardiovascular:      Rate and Rhythm: Normal rate and regular rhythm.      Pulses: Normal pulses.      Heart sounds: No murmur. No friction rub. No gallop.    Pulmonary:      Effort: Pulmonary effort is normal. No respiratory distress.      Breath sounds: Normal breath sounds. No stridor. No wheezing, rhonchi or rales.   Chest:      Chest wall: No tenderness.   Abdominal:      General: Bowel sounds are  normal. There is no distension. There are no signs of injury.      Palpations: Abdomen is soft. There is no fluid wave, hepatomegaly, splenomegaly or mass.      Tenderness: There is no abdominal tenderness. There is no right CVA tenderness, left CVA tenderness, guarding or rebound.      Hernia: No hernia is present.   Musculoskeletal:      Thoracic back: She exhibits decreased range of motion and tenderness. She exhibits no bony tenderness, no swelling, no edema, no deformity, no laceration, no pain, no spasm and normal pulse.      Right lower leg: No edema.      Left lower leg: No edema.      Comments: Back:  Cervical, thoracic, lumbar spine or midline with no midline tenderness or step-offs,.  Spinal musculature soft, tenderness across right-sided thoracic musculature, no palpable mass spasm, no overlying erythema, no ecchymosis. Range of motion is present but decreased secondary to pain normal and equal sensation and strength throughout upper and lower extremities.     Skin:     General: Skin is warm.      Capillary Refill: Capillary refill takes less than 2 seconds.      Coloration: Skin is not cyanotic, jaundiced or pale.      Findings: No rash.   Neurological:      General: No focal deficit present.      Mental Status: She is alert and oriented to person, place, and time.      GCS: GCS eye subscore is 4. GCS verbal subscore is 5. GCS motor subscore is 6.      Cranial Nerves: Cranial nerves are intact.      Sensory: Sensation is intact.      Motor: Motor function is intact.      Comments: Normal and equal sensation strength throughout moving all extremities freely.  Ambulatory with an upright steady gait   Psychiatric:         Mood and Affect: Mood normal.         Behavior: Behavior normal.         Procedures           ED Course  ED Course as of Feb 05 0632   Thu Feb 04, 2021   1610 Patient states she has had this chest pain when her back pain gets really severe.    [AA]   1715 Care of patient assumed from  "SUDARSHAN Bhardwaj pending CT results and disposition.    [AL]   1733 Awaiting CT results.    [AL]   1805 Awaiting CT results.    [AL]   1914 Delay in care related to patient not being taken to CT.    [AL]   2035 Awaiting CT results.    [AL]      ED Course User Index  [AA] Fanta Alonso PA  [AL] Elyse Rangel, NP          /80 (BP Location: Right arm, Patient Position: Lying)   Pulse 75   Temp 97.7 °F (36.5 °C) (Oral)   Resp 16   Ht 154.9 cm (61\")   Wt 71.9 kg (158 lb 8.2 oz)   SpO2 100%   BMI 29.95 kg/m²   Medications   sodium chloride 0.9 % flush 10 mL (has no administration in time range)   nitroglycerin (NITROSTAT) SL tablet 0.4 mg (has no administration in time range)   sodium chloride 0.9 % flush 10 mL (10 mL Intravenous Given 2/4/21 2357)   sodium chloride 0.9 % flush 10 mL (has no administration in time range)   acetaminophen (TYLENOL) tablet 650 mg (has no administration in time range)     Or   acetaminophen (TYLENOL) 160 MG/5ML solution 650 mg (has no administration in time range)     Or   acetaminophen (TYLENOL) suppository 650 mg (has no administration in time range)   ondansetron (ZOFRAN) tablet 4 mg (has no administration in time range)     Or   ondansetron (ZOFRAN) injection 4 mg (has no administration in time range)   melatonin tablet 5 mg (has no administration in time range)   dextrose (GLUTOSE) oral gel 15 g (has no administration in time range)   dextrose (D50W) 25 g/ 50mL Intravenous Solution 25 g (has no administration in time range)   glucagon (human recombinant) (GLUCAGEN DIAGNOSTIC) injection 1 mg (has no administration in time range)   insulin lispro (humaLOG, ADMELOG) injection 0-9 Units (has no administration in time range)     And   insulin lispro (humaLOG, ADMELOG) injection 0-9 Units (has no administration in time range)   aspirin chewable tablet 81 mg (has no administration in time range)   cyclobenzaprine (FLEXERIL) tablet 10 mg (has no administration in time range) "   lisinopril (PRINIVIL,ZESTRIL) tablet 20 mg (has no administration in time range)   rosuvastatin (CRESTOR) tablet 10 mg (10 mg Oral Not Given 2/5/21 0310)   ketorolac (TORADOL) injection 15 mg (15 mg Intravenous Given 2/4/21 1515)   methocarbamol (ROBAXIN) tablet 750 mg (750 mg Oral Given 2/4/21 1515)   lidocaine (LIDODERM) 5 % 1 patch (1 patch Transdermal Medication Removed 2/5/21 0310)   HYDROcodone-acetaminophen (NORCO) 5-325 MG per tablet 1 tablet (1 tablet Oral Given 2/4/21 1720)   aspirin chewable tablet 324 mg (324 mg Oral Given 2/4/21 1720)   iopamidol (ISOVUE-370) 76 % injection 100 mL (100 mL Intravenous Given 2/4/21 2053)     Labs Reviewed   COMPREHENSIVE METABOLIC PANEL - Abnormal; Notable for the following components:       Result Value    Glucose 261 (*)     Sodium 135 (*)     All other components within normal limits    Narrative:     GFR Normal >60  Chronic Kidney Disease <60  Kidney Failure <15     D-DIMER, QUANTITATIVE - Abnormal; Notable for the following components:    D-Dimer, Quantitative 0.81 (*)     All other components within normal limits    Narrative:     Reference Range  --------------------------------------------------------------------     < 0.50   Negative Predictive Value  0.50-0.59   Indeterminate    >= 0.60   Probable VTE             A very low percentage of patients with DVT may yield D-Dimer results   below the cut-off of 0.50 mg/L FEU.  This is known to be more   prevalent in patients with distal DVT.             Results of this test should always be interpreted in conjunction with   the patient's medical history, clinical presentation and other   findings.  Clinical diagnosis should not be based on the result of   [place] D-Dimer alone.   CBC WITH AUTO DIFFERENTIAL - Abnormal; Notable for the following components:    Neutrophil % 78.7 (*)     Lymphocyte % 14.8 (*)     Neutrophils, Absolute 8.50 (*)     All other components within normal limits   BASIC METABOLIC PANEL -  Abnormal; Notable for the following components:    Glucose 291 (*)     All other components within normal limits    Narrative:     GFR Normal >60  Chronic Kidney Disease <60  Kidney Failure <15     CBC WITH AUTO DIFFERENTIAL - Abnormal; Notable for the following components:    Platelets 131 (*)     All other components within normal limits   POCT GLUCOSE FINGERSTICK - Abnormal; Notable for the following components:    Glucose 177 (*)     All other components within normal limits   COVID-19,APTIMA PANTHER,ELISHA IN-HOUSE,NP/OP SWAB IN UTM/VTM/SALINE TRANSPORT MEDIA,24 HR TAT - Normal    Narrative:     Fact sheet for providers: https://www.fda.gov/media/163660/download     Fact sheet for patients: https://www.fda.gov/media/008730/download    Test performed by RT PCR.   TROPONIN (IN-HOUSE) - Normal    Narrative:     Troponin T Reference Range:  <= 0.03 ng/mL-   Negative for AMI  >0.03 ng/mL-     Abnormal for myocardial necrosis.  Clinicians would have to utilize clinical acumen, EKG, Troponin and serial changes to determine if it is an Acute Myocardial Infarction or myocardial injury due to an underlying chronic condition.       Results may be falsely decreased if patient taking Biotin.     TROPONIN (IN-HOUSE) - Normal    Narrative:     Troponin T Reference Range:  <= 0.03 ng/mL-   Negative for AMI  >0.03 ng/mL-     Abnormal for myocardial necrosis.  Clinicians would have to utilize clinical acumen, EKG, Troponin and serial changes to determine if it is an Acute Myocardial Infarction or myocardial injury due to an underlying chronic condition.       Results may be falsely decreased if patient taking Biotin.     BNP (IN-HOUSE) - Normal    Narrative:     Among patients with dyspnea, NT-proBNP is highly sensitive for the detection of acute congestive heart failure. In addition NT-proBNP of <300 pg/ml effectively rules out acute congestive heart failure with 99% negative predictive value.    Results may be falsely decreased  if patient taking Biotin.     URINE DRUG SCREEN - Normal    Narrative:     Negative Thresholds For Drugs Screened:     Amphetamines               500 ng/ml   Barbiturates               200 ng/ml   Benzodiazepines            100 ng/ml   Cocaine                    300 ng/ml   Methadone                  300 ng/ml   Opiates                    300 ng/ml   Oxycodone                  100 ng/ml   THC                        50 ng/ml    The Normal Value for all drugs tested is negative. This report includes final unconfirmed screening results to be used for medical treatment purposes only. Unconfirmed results must not be used for non-medical purposes such as employment or legal testing. Clinical consideration should be applied to any drug of abuse test, particulary when unconfirmed results are used.  All urine drugs of abuse requests without chain of custody are for medical screening purposes only.  False positives are possible.     TROPONIN (IN-HOUSE) - Normal    Narrative:     Troponin T Reference Range:  <= 0.03 ng/mL-   Negative for AMI  >0.03 ng/mL-     Abnormal for myocardial necrosis.  Clinicians would have to utilize clinical acumen, EKG, Troponin and serial changes to determine if it is an Acute Myocardial Infarction or myocardial injury due to an underlying chronic condition.       Results may be falsely decreased if patient taking Biotin.     BNP (IN-HOUSE) - Normal    Narrative:     Among patients with dyspnea, NT-proBNP is highly sensitive for the detection of acute congestive heart failure. In addition NT-proBNP of <300 pg/ml effectively rules out acute congestive heart failure with 99% negative predictive value.    Results may be falsely decreased if patient taking Biotin.     TSH - Normal   HEMOGLOBIN A1C   TROPONIN (IN-HOUSE)   HEPATIC FUNCTION PANEL   POCT GLUCOSE FINGERSTICK   POCT GLUCOSE FINGERSTICK   POCT GLUCOSE FINGERSTICK   POCT GLUCOSE FINGERSTICK   POCT GLUCOSE FINGERSTICK   CBC AND DIFFERENTIAL     Narrative:     The following orders were created for panel order CBC & Differential.  Procedure                               Abnormality         Status                     ---------                               -----------         ------                     CBC Auto Differential[[id number]]        Abnormal            Final result                 Please view results for these tests on the individual orders.   EXTRA TUBES    Narrative:     The following orders were created for panel order Extra Tubes.  Procedure                               Abnormality         Status                     ---------                               -----------         ------                     Gold Top - SST[[id number]]                                   Final result                 Please view results for these tests on the individual orders.   GOLD TOP - SST     Xr Spine Thoracic 3 View    Result Date: 2/4/2021  No significant change in appearance of the thoracic spine since outside CT exam with minimal compression of the superior endplate of T9 and mild anterior wedging at T12. Osteopenia.  Electronically Signed By-Kim Burkett MD On:2/4/2021 3:15 PM This report was finalized on [id number] by  Kim Burkett MD.    Xr Chest 1 View    Result Date: 2/4/2021  1. Stable cardiomegaly. 2. No acute process.  Electronically Signed By-Westley Kirkpatrick MD On:2/4/2021 3:08 PM This report was finalized on [id number] by  Westley Kirkpatrick MD.    Ct Chest Pulmonary Embolism    Result Date: 2/4/2021  No evidence of pulmonary embolic disease. Prominent pulmonary arteries with pulmonary valve prosthesis. Chronic appearing interstitial changes in the lungs are stable.  Electronically Signed By-Rocky Heredia MD On:2/4/2021 8:58 PM This report was finalized on [id number] by  Rocky Heredia MD.                                      MDM  Number of Diagnoses or Management Options  Acute right-sided thoracic back pain:   Chest pain, unspecified type:   Diagnosis  management comments: Cardiac stress test done on 12/22/2019 reviewed by Dr. Valentino  Findings consistent with an indeterminate ECG stress test.  ·Left ventricular ejection fraction is normal (Calculated EF = 56%).  ·Impressions are consistent with a low risk study.  ·Nuclear images reviewed revealing prominent apical slit. Negative for ischemia.   Patient has paced rhythm, no changes during lexiscan injection   Stress portion of this exam was supervised by: Amy Cohen NP    Comorbidity: As per past medical history  ECG: Interpreted by myself and Dr. Camilo shows ventricular paced rhythm rate 81 previous EKG was reviewed from 9/19/2020 which showed AV dual paced rhythm  Labs: Troponin within normal limits D-dimer elevated 0.81 CBC unremarkable.  CMP showed glucose 261 sodium 135 otherwise unremarkable.    Imaging: Was interpreted by physician and reviewed by myself:  Xr Spine Thoracic 3 View  Result Date: 2/4/2021  No significant change in appearance of the thoracic spine since outside CT exam with minimal compression of the superior endplate of T9 and mild anterior wedging at T12. Osteopenia.  Electronically Signed By-Kim Burkett MD On:2/4/2021 3:15 PM This report was finalized on [id number] by  Kim Burkett MD.    Xr Chest 1 View  Result Date: 2/4/2021  1. Stable cardiomegaly. 2. No acute process.  Electronically Signed By-Westley Kirkpatrick MD On:2/4/2021 3:08 PM This report was finalized on [id number] by  Westley Kirkpatrick MD.    Disposition/Treatment:  Appropriate PPE was worn during exam and throughout all encounters with the patient.  While in the ED IV was placed and labs are obtained EKG showed no significant change from prior.  Troponin was within normal limits.  D-dimer was elevated at 0.81 CT PE protocol was initiated.  X-ray of thoracic spine showed no acute abnormalities as above.  There are no signs of severe infection or electrolyte abnormality.  Patient was found to be hyperglycemic but no  signs of DKA.  In regards to her back pain she was originally given Toradol Robaxin lidocaine patches with minimal relief of her symptoms she was given additional Norco for her back pain which she does report some improvement of her pain afterwards.  Patient is back pain is reproducible however her chest pain is not reproducible.  She was given aspirin for her chest pain patient does have a history of cardiac disease along with diabetes and hypertension she will be admitted for observation of her chest pain.  She is currently denying any upon reassessment.  Patient care was transferred to Trish Rangel NP pending CT results and disposition       Amount and/or Complexity of Data Reviewed  Clinical lab tests: reviewed  Tests in the radiology section of CPT®: reviewed        Final diagnoses:   Chest pain, unspecified type   Acute right-sided thoracic back pain            Fanta Alonso PA  02/04/21 1715       Fanta Alonso PA  02/05/21 0678

## 2021-02-04 NOTE — TELEPHONE ENCOUNTER
Called pt to let her know that she has osteoporosis and she needs a follow-up with Dr. Nunez. Appointment scheduled for 2/12 at 1130.

## 2021-02-04 NOTE — ED TRIAGE NOTES
Pt reports off/on mid to upper back pain since November 2019, c/o today with increased pain unrelieved by OTC medications. C/o pain radiating into chest.

## 2021-02-05 ENCOUNTER — APPOINTMENT (OUTPATIENT)
Dept: CARDIOLOGY | Facility: HOSPITAL | Age: 61
End: 2021-02-05

## 2021-02-05 VITALS
HEART RATE: 70 BPM | WEIGHT: 158 LBS | HEIGHT: 61 IN | RESPIRATION RATE: 16 BRPM | OXYGEN SATURATION: 99 % | SYSTOLIC BLOOD PRESSURE: 134 MMHG | DIASTOLIC BLOOD PRESSURE: 88 MMHG | BODY MASS INDEX: 29.83 KG/M2 | TEMPERATURE: 98 F

## 2021-02-05 PROBLEM — M54.12 CERVICAL MYELOPATHY WITH CERVICAL RADICULOPATHY: Chronic | Status: ACTIVE | Noted: 2020-09-18

## 2021-02-05 PROBLEM — M54.9 BACK PAIN: Chronic | Status: ACTIVE | Noted: 2021-02-05

## 2021-02-05 PROBLEM — Z79.4 CONTROLLED TYPE 2 DIABETES MELLITUS WITHOUT COMPLICATION, WITH LONG-TERM CURRENT USE OF INSULIN (HCC): Chronic | Status: ACTIVE | Noted: 2020-08-19

## 2021-02-05 PROBLEM — E11.9 CONTROLLED TYPE 2 DIABETES MELLITUS WITHOUT COMPLICATION, WITH LONG-TERM CURRENT USE OF INSULIN: Chronic | Status: ACTIVE | Noted: 2020-08-19

## 2021-02-05 PROBLEM — Q21.3 TETRALOGY OF FALLOT: Chronic | Status: ACTIVE | Noted: 2019-07-11

## 2021-02-05 PROBLEM — I27.20 PULMONARY HYPERTENSION: Chronic | Status: ACTIVE | Noted: 2017-09-13

## 2021-02-05 PROBLEM — G95.9 CERVICAL MYELOPATHY WITH CERVICAL RADICULOPATHY: Chronic | Status: ACTIVE | Noted: 2020-09-18

## 2021-02-05 PROBLEM — E66.9 OBESITY (BMI 30-39.9): Chronic | Status: ACTIVE | Noted: 2021-02-05

## 2021-02-05 LAB
ALBUMIN SERPL-MCNC: 4.2 G/DL (ref 3.5–5.2)
ALP SERPL-CCNC: 100 U/L (ref 39–117)
ALT SERPL W P-5'-P-CCNC: 16 U/L (ref 1–33)
AMPHET+METHAMPHET UR QL: NEGATIVE
ANION GAP SERPL CALCULATED.3IONS-SCNC: 13 MMOL/L (ref 5–15)
AST SERPL-CCNC: 18 U/L (ref 1–32)
BARBITURATES UR QL SCN: NEGATIVE
BASOPHILS # BLD AUTO: 0 10*3/MM3 (ref 0–0.2)
BASOPHILS NFR BLD AUTO: 0.4 % (ref 0–1.5)
BENZODIAZ UR QL SCN: NEGATIVE
BH CV ECHO MEAS - AO MAX PG (FULL): 7.7 MMHG
BH CV ECHO MEAS - AO MAX PG: 10.7 MMHG
BH CV ECHO MEAS - AO MEAN PG (FULL): 4.8 MMHG
BH CV ECHO MEAS - AO MEAN PG: 6.7 MMHG
BH CV ECHO MEAS - AO ROOT AREA (BSA CORRECTED): 2.3
BH CV ECHO MEAS - AO ROOT AREA: 12.1 CM^2
BH CV ECHO MEAS - AO ROOT DIAM: 3.9 CM
BH CV ECHO MEAS - AO V2 MAX: 162.5 CM/SEC
BH CV ECHO MEAS - AO V2 MEAN: 121.7 CM/SEC
BH CV ECHO MEAS - AO V2 VTI: 36.5 CM
BH CV ECHO MEAS - AORTIC HR: 69.9 BPM
BH CV ECHO MEAS - AORTIC R-R: 0.86 SEC
BH CV ECHO MEAS - AVA(I,A): 1.3 CM^2
BH CV ECHO MEAS - AVA(I,D): 1.3 CM^2
BH CV ECHO MEAS - AVA(V,A): 1.6 CM^2
BH CV ECHO MEAS - AVA(V,D): 1.6 CM^2
BH CV ECHO MEAS - BSA(HAYCOCK): 1.8 M^2
BH CV ECHO MEAS - BSA: 1.7 M^2
BH CV ECHO MEAS - BZI_BMI: 29.9 KILOGRAMS/M^2
BH CV ECHO MEAS - BZI_METRIC_HEIGHT: 154.9 CM
BH CV ECHO MEAS - BZI_METRIC_WEIGHT: 71.7 KG
BH CV ECHO MEAS - CI(AO): 18.1 L/MIN/M^2
BH CV ECHO MEAS - CI(LVOT): 1.9 L/MIN/M^2
BH CV ECHO MEAS - CO(AO): 30.9 L/MIN
BH CV ECHO MEAS - CO(LVOT): 3.3 L/MIN
BH CV ECHO MEAS - EDV(CUBED): 68 ML
BH CV ECHO MEAS - EDV(MOD-SP4): 79.1 ML
BH CV ECHO MEAS - EDV(TEICH): 73.5 ML
BH CV ECHO MEAS - EF(CUBED): 53.9 %
BH CV ECHO MEAS - EF(MOD-BP): 44 %
BH CV ECHO MEAS - EF(MOD-SP4): 44.1 %
BH CV ECHO MEAS - EF(TEICH): 46.2 %
BH CV ECHO MEAS - ESV(CUBED): 31.3 ML
BH CV ECHO MEAS - ESV(MOD-SP4): 44.2 ML
BH CV ECHO MEAS - ESV(TEICH): 39.5 ML
BH CV ECHO MEAS - FS: 22.8 %
BH CV ECHO MEAS - IVS/LVPW: 0.98
BH CV ECHO MEAS - IVSD: 0.97 CM
BH CV ECHO MEAS - LA DIMENSION(2D): 3.3 CM
BH CV ECHO MEAS - LV DIASTOLIC VOL/BSA (35-75): 46.3 ML/M^2
BH CV ECHO MEAS - LV MASS(C)D: 128.4 GRAMS
BH CV ECHO MEAS - LV MASS(C)DI: 75.1 GRAMS/M^2
BH CV ECHO MEAS - LV MAX PG: 2.9 MMHG
BH CV ECHO MEAS - LV MEAN PG: 1.9 MMHG
BH CV ECHO MEAS - LV SYSTOLIC VOL/BSA (12-30): 25.9 ML/M^2
BH CV ECHO MEAS - LV V1 MAX: 85.8 CM/SEC
BH CV ECHO MEAS - LV V1 MEAN: 66.5 CM/SEC
BH CV ECHO MEAS - LV V1 VTI: 15.7 CM
BH CV ECHO MEAS - LVIDD: 4.1 CM
BH CV ECHO MEAS - LVIDS: 3.2 CM
BH CV ECHO MEAS - LVOT AREA: 3 CM^2
BH CV ECHO MEAS - LVOT DIAM: 1.9 CM
BH CV ECHO MEAS - LVPWD: 1 CM
BH CV ECHO MEAS - MV A MAX VEL: 79.6 CM/SEC
BH CV ECHO MEAS - MV DEC SLOPE: 314 CM/SEC^2
BH CV ECHO MEAS - MV DEC TIME: 0.23 SEC
BH CV ECHO MEAS - MV E MAX VEL: 72.3 CM/SEC
BH CV ECHO MEAS - MV E/A: 0.91
BH CV ECHO MEAS - MV MAX PG: 2.4 MMHG
BH CV ECHO MEAS - MV MEAN PG: 1.1 MMHG
BH CV ECHO MEAS - MV V2 MAX: 77.5 CM/SEC
BH CV ECHO MEAS - MV V2 MEAN: 49 CM/SEC
BH CV ECHO MEAS - MV V2 VTI: 16.8 CM
BH CV ECHO MEAS - MVA(VTI): 2.8 CM^2
BH CV ECHO MEAS - PA MAX PG (FULL): 3 MMHG
BH CV ECHO MEAS - PA MAX PG: 11 MMHG
BH CV ECHO MEAS - PA MEAN PG (FULL): 2.1 MMHG
BH CV ECHO MEAS - PA MEAN PG: 6.6 MMHG
BH CV ECHO MEAS - PA V2 MAX: 165.5 CM/SEC
BH CV ECHO MEAS - PA V2 MEAN: 122.4 CM/SEC
BH CV ECHO MEAS - PA V2 VTI: 33.2 CM
BH CV ECHO MEAS - RAP SYSTOLE: 3 MMHG
BH CV ECHO MEAS - RV MAX PG: 8 MMHG
BH CV ECHO MEAS - RV MEAN PG: 4.5 MMHG
BH CV ECHO MEAS - RV V1 MAX: 141.4 CM/SEC
BH CV ECHO MEAS - RV V1 MEAN: 97.1 CM/SEC
BH CV ECHO MEAS - RV V1 VTI: 28.1 CM
BH CV ECHO MEAS - RVDD: 4.4 CM
BH CV ECHO MEAS - RVSP: 25.2 MMHG
BH CV ECHO MEAS - SI(AO): 258.9 ML/M^2
BH CV ECHO MEAS - SI(CUBED): 21.5 ML/M^2
BH CV ECHO MEAS - SI(LVOT): 27.3 ML/M^2
BH CV ECHO MEAS - SI(MOD-SP4): 20.4 ML/M^2
BH CV ECHO MEAS - SI(TEICH): 19.9 ML/M^2
BH CV ECHO MEAS - SV(AO): 442.5 ML
BH CV ECHO MEAS - SV(CUBED): 36.7 ML
BH CV ECHO MEAS - SV(LVOT): 46.7 ML
BH CV ECHO MEAS - SV(MOD-SP4): 34.9 ML
BH CV ECHO MEAS - SV(TEICH): 34 ML
BH CV ECHO MEAS - TR MAX VEL: 231.6 CM/SEC
BILIRUB CONJ SERPL-MCNC: <0.2 MG/DL (ref 0–0.3)
BILIRUB INDIRECT SERPL-MCNC: NORMAL MG/DL
BILIRUB SERPL-MCNC: 0.3 MG/DL (ref 0–1.2)
BUN SERPL-MCNC: 19 MG/DL (ref 6–20)
BUN/CREAT SERPL: 21.6 (ref 7–25)
CALCIUM SPEC-SCNC: 9 MG/DL (ref 8.6–10.5)
CANNABINOIDS SERPL QL: NEGATIVE
CHLORIDE SERPL-SCNC: 102 MMOL/L (ref 98–107)
CO2 SERPL-SCNC: 23 MMOL/L (ref 22–29)
COCAINE UR QL: NEGATIVE
CREAT SERPL-MCNC: 0.88 MG/DL (ref 0.57–1)
DEPRECATED RDW RBC AUTO: 39.8 FL (ref 37–54)
EOSINOPHIL # BLD AUTO: 0.1 10*3/MM3 (ref 0–0.4)
EOSINOPHIL NFR BLD AUTO: 0.9 % (ref 0.3–6.2)
ERYTHROCYTE [DISTWIDTH] IN BLOOD BY AUTOMATED COUNT: 13.5 % (ref 12.3–15.4)
GFR SERPL CREATININE-BSD FRML MDRD: 66 ML/MIN/1.73
GLUCOSE BLDC GLUCOMTR-MCNC: 101 MG/DL (ref 70–105)
GLUCOSE BLDC GLUCOMTR-MCNC: 250 MG/DL (ref 70–105)
GLUCOSE SERPL-MCNC: 291 MG/DL (ref 65–99)
HBA1C MFR BLD: 8.7 % (ref 3.5–5.6)
HCT VFR BLD AUTO: 40.9 % (ref 34–46.6)
HGB BLD-MCNC: 13.6 G/DL (ref 12–15.9)
LYMPHOCYTES # BLD AUTO: 2.2 10*3/MM3 (ref 0.7–3.1)
LYMPHOCYTES NFR BLD AUTO: 30.7 % (ref 19.6–45.3)
MCH RBC QN AUTO: 27.8 PG (ref 26.6–33)
MCHC RBC AUTO-ENTMCNC: 33.1 G/DL (ref 31.5–35.7)
MCV RBC AUTO: 83.8 FL (ref 79–97)
METHADONE UR QL SCN: NEGATIVE
MONOCYTES # BLD AUTO: 0.4 10*3/MM3 (ref 0.1–0.9)
MONOCYTES NFR BLD AUTO: 6.1 % (ref 5–12)
NEUTROPHILS NFR BLD AUTO: 4.5 10*3/MM3 (ref 1.7–7)
NEUTROPHILS NFR BLD AUTO: 61.9 % (ref 42.7–76)
NRBC BLD AUTO-RTO: 0.1 /100 WBC (ref 0–0.2)
NT-PROBNP SERPL-MCNC: 288.1 PG/ML (ref 0–900)
OPIATES UR QL: NEGATIVE
OXYCODONE UR QL SCN: NEGATIVE
PLATELET # BLD AUTO: 131 10*3/MM3 (ref 140–450)
PMV BLD AUTO: 8.2 FL (ref 6–12)
POTASSIUM SERPL-SCNC: 3.9 MMOL/L (ref 3.5–5.2)
PROT SERPL-MCNC: 7.4 G/DL (ref 6–8.5)
RBC # BLD AUTO: 4.88 10*6/MM3 (ref 3.77–5.28)
SARS-COV-2 ORF1AB RESP QL NAA+PROBE: NOT DETECTED
SODIUM SERPL-SCNC: 138 MMOL/L (ref 136–145)
TROPONIN T SERPL-MCNC: <0.01 NG/ML (ref 0–0.03)
TROPONIN T SERPL-MCNC: <0.01 NG/ML (ref 0–0.03)
TSH SERPL DL<=0.05 MIU/L-ACNC: 1.85 UIU/ML (ref 0.27–4.2)
WBC # BLD AUTO: 7.2 10*3/MM3 (ref 3.4–10.8)

## 2021-02-05 PROCEDURE — 80307 DRUG TEST PRSMV CHEM ANLYZR: CPT | Performed by: STUDENT IN AN ORGANIZED HEALTH CARE EDUCATION/TRAINING PROGRAM

## 2021-02-05 PROCEDURE — 84484 ASSAY OF TROPONIN QUANT: CPT | Performed by: STUDENT IN AN ORGANIZED HEALTH CARE EDUCATION/TRAINING PROGRAM

## 2021-02-05 PROCEDURE — G0378 HOSPITAL OBSERVATION PER HR: HCPCS

## 2021-02-05 PROCEDURE — 83036 HEMOGLOBIN GLYCOSYLATED A1C: CPT | Performed by: STUDENT IN AN ORGANIZED HEALTH CARE EDUCATION/TRAINING PROGRAM

## 2021-02-05 PROCEDURE — 83880 ASSAY OF NATRIURETIC PEPTIDE: CPT | Performed by: STUDENT IN AN ORGANIZED HEALTH CARE EDUCATION/TRAINING PROGRAM

## 2021-02-05 PROCEDURE — 80076 HEPATIC FUNCTION PANEL: CPT | Performed by: STUDENT IN AN ORGANIZED HEALTH CARE EDUCATION/TRAINING PROGRAM

## 2021-02-05 PROCEDURE — 93306 TTE W/DOPPLER COMPLETE: CPT | Performed by: INTERNAL MEDICINE

## 2021-02-05 PROCEDURE — 99217 PR OBSERVATION CARE DISCHARGE MANAGEMENT: CPT | Performed by: INTERNAL MEDICINE

## 2021-02-05 PROCEDURE — 63710000001 INSULIN LISPRO (HUMAN) PER 5 UNITS: Performed by: STUDENT IN AN ORGANIZED HEALTH CARE EDUCATION/TRAINING PROGRAM

## 2021-02-05 PROCEDURE — 84443 ASSAY THYROID STIM HORMONE: CPT | Performed by: STUDENT IN AN ORGANIZED HEALTH CARE EDUCATION/TRAINING PROGRAM

## 2021-02-05 PROCEDURE — 80048 BASIC METABOLIC PNL TOTAL CA: CPT | Performed by: STUDENT IN AN ORGANIZED HEALTH CARE EDUCATION/TRAINING PROGRAM

## 2021-02-05 PROCEDURE — 82962 GLUCOSE BLOOD TEST: CPT

## 2021-02-05 PROCEDURE — 93306 TTE W/DOPPLER COMPLETE: CPT

## 2021-02-05 PROCEDURE — 85025 COMPLETE CBC W/AUTO DIFF WBC: CPT | Performed by: STUDENT IN AN ORGANIZED HEALTH CARE EDUCATION/TRAINING PROGRAM

## 2021-02-05 RX ORDER — ASPIRIN 81 MG/1
81 TABLET, CHEWABLE ORAL DAILY
Status: DISCONTINUED | OUTPATIENT
Start: 2021-02-05 | End: 2021-02-05 | Stop reason: HOSPADM

## 2021-02-05 RX ORDER — LISINOPRIL 20 MG/1
20 TABLET ORAL DAILY
Status: DISCONTINUED | OUTPATIENT
Start: 2021-02-05 | End: 2021-02-05 | Stop reason: HOSPADM

## 2021-02-05 RX ORDER — HYDROCODONE BITARTRATE AND ACETAMINOPHEN 5; 325 MG/1; MG/1
1 TABLET ORAL EVERY 6 HOURS PRN
Status: DISCONTINUED | OUTPATIENT
Start: 2021-02-05 | End: 2021-02-05 | Stop reason: HOSPADM

## 2021-02-05 RX ORDER — ROSUVASTATIN CALCIUM 10 MG/1
10 TABLET, COATED ORAL NIGHTLY
Status: DISCONTINUED | OUTPATIENT
Start: 2021-02-05 | End: 2021-02-05 | Stop reason: HOSPADM

## 2021-02-05 RX ORDER — CYCLOBENZAPRINE HCL 10 MG
10 TABLET ORAL 3 TIMES DAILY PRN
Status: DISCONTINUED | OUTPATIENT
Start: 2021-02-05 | End: 2021-02-05 | Stop reason: HOSPADM

## 2021-02-05 RX ADMIN — CYCLOBENZAPRINE HYDROCHLORIDE 10 MG: 10 TABLET, FILM COATED ORAL at 10:16

## 2021-02-05 RX ADMIN — HYDROCODONE BITARTRATE AND ACETAMINOPHEN 1 TABLET: 5; 325 TABLET ORAL at 10:16

## 2021-02-05 RX ADMIN — ASPIRIN 81 MG: 81 TABLET, CHEWABLE ORAL at 08:27

## 2021-02-05 RX ADMIN — LISINOPRIL 20 MG: 20 TABLET ORAL at 08:27

## 2021-02-05 RX ADMIN — INSULIN LISPRO 6 UNITS: 100 INJECTION, SOLUTION INTRAVENOUS; SUBCUTANEOUS at 08:27

## 2021-02-05 RX ADMIN — Medication 10 ML: at 08:34

## 2021-02-05 NOTE — PROGRESS NOTES
Continued Stay Note  MICHAEL Causey     Patient Name: Gladys Garrison  MRN: [id number]  Today's Date: 2/5/2021    Admit Date: 2/4/2021    Discharge Plan     Row Name 02/05/21 1004       Plan    Plan Comments  SW met w/ pt at bedside to complete Oncology Distress Screening (appropriate PPE worn: mask & goggles, maintained distance of 6 ft, less than 15 minutes at bedside). Pt denied any sources of distress at this time. Pt is aware of outpatient Oncology Social Worker available at [facility]. Pt denied any further needs at this time.        Dione Looney, BRIDGETW, LSW  PRN   Phone: [phone]

## 2021-02-05 NOTE — PROGRESS NOTES
Discharge Planning Assessment  [facility]     Patient Name: Gladys Garrison  MRN: [id number]  Today's Date: 2/5/2021    Admit Date: 2/4/2021    Discharge Needs Assessment     Row Name 02/05/21 1230       Living Environment    Lives With  spouse    Current Living Arrangements  home/apartment/condo    Primary Care Provided by  self    Able to Return to Prior Arrangements  yes       Resource/Environmental Concerns    Resource/Environmental Concerns  none    Transportation Concerns  car, none       Transition Planning    Patient/Family Anticipates Transition to  home with family    Patient/Family Anticipated Services at Transition  none    Transportation Anticipated  family or friend will provide Spouse to transport to home at DC       Discharge Needs Assessment    Equipment Currently Used at Home  glucometer    Concerns to be Addressed  no discharge needs identified    Anticipated Changes Related to Illness  none    Equipment Needed After Discharge  none        Discharge Plan     Row Name 02/05/21 1232       Plan    Plan  Routine d/c to home      Plan Comments  Spoke to patient in room  with mask and goggles maintaining 6 ft for less than 15 min.  Patient states she lives at home with her spouse and he helps as needed. PCP is [place] and Pharmacy is [facility] in [place]. Plan to DC today     Row Name 02/05/21 1004       Plan    Plan Comments  SW met w/ pt at bedside to complete Oncology Distress Screening (appropriate PPE worn: mask & goggles, maintained distance of 6 ft, less than 15 minutes at bedside). Pt denied any sources of distress at this time. Pt is aware of outpatient Oncology Social Worker available [facility]. Pt denied any further needs at this time.        Continued Care and Services - Discharged on 2/5/2021 Admission date: 2/4/2021 - Discharge disposition: Home or Self Care       Expected Discharge Date and Time     Expected Discharge Date Expected Discharge Time    Feb 5, 2021 12:30 PM         Demographic Summary     Row Name 02/05/21 1229       General Information    Admission Type  observation    Arrived From  emergency department    Required Notices Provided  Observation Status Notice    Referral Source  admission list    Reason for Consult  discharge planning    Preferred Language  English        Functional Status     Row Name 02/05/21 1230       Functional Status, IADL    Medications  assistive person    Meal Preparation  assistive person    Housekeeping  assistive person    Laundry  assistive person    Shopping  assistive person       Mental Status    General Appearance WDL  WDL               Patient Forms     Row Name 02/05/21 1229       Patient Forms    Patient Observation Letter  Delivered Moon 2/4/21 per Registration        Gita Ashton RN, CM  Office Phone [phone]  Cell [phone]

## 2021-02-05 NOTE — DISCHARGE SUMMARY
[facility] Medicine Services  DISCHARGE SUMMARY        Prepared For PCP:  Ignacia Lundy MD (Inactive)    Patient Name: Gladys Garrison  : 1960  MRN: [id number]      Date of Admission:   2021    Date of Discharge:  2021    Length of stay:  LOS: 0 days     Hospital Course     Presenting Problem:   Acute right-sided thoracic back pain [M54.6]  Chest pain, unspecified type [R07.9]      Active Hospital Problems    Diagnosis  POA   • **Back pain [M54.9]  Yes   • Obesity (BMI 30-39.9) [E66.9]  Yes   • Cervical myelopathy with cervical radiculopathy (CMS/HCC) [G95.9, M54.12]  Yes   • Controlled type 2 diabetes mellitus without complication, with long-term current use of insulin (CMS/HCC) [E11.9, Z79.4]  Not Applicable   • Chest pain [R07.9]  Yes   • Tetralogy of Fallot [Q21.3]  Not Applicable   • Pulmonary hypertension (CMS/HCC) [I27.20]  Yes   • Hyperlipidemia [E78.5]  Yes   • Hypertensive disorder [I10]  Yes      Resolved Hospital Problems   No resolved problems to display.           Hospital Course:  Gladys Garrison is a 60 y.o. female who presents to [facility] ED with a history of tetralogy of Fallot, diabetes mellitus type 2, hepatitis C, breast cancer, hypertension, and hyperlipidemia complaining of right upper back pain and some chest pain.  Patient states she was at work and began to have a dull aching right-sided back pain so she took 3 Tylenol.  She applied icy hot to the area and found no relief.  Patient took 3 more Tylenol and added IcyHot again.  She still found no relief so she took 3 more Tylenol and added IcyHot again to the area.  Due to patient's history of cardiac disease patient was admitted for chest pain rule out, troponins negative, EKG with V paced rhythm.  ACS ruled out.  CTA of the chest also negative for any PE or any acute findings.  No fractures noted on x-rays.  Patient's pain likely musculoskeletal and she is tender to palpation in  the same area.  No further chest pain noted.  Echo done this admission with results which can be followed up at her cardiologist's office as she has regular follow-ups there.  Patient right-sided upper back pain is not related to her cardiac history.  This is musculoskeletal and patient felt much better after taking Norco and Flexeril today.  Patient is clinically improved and wants to go home today.  She also has scheduled procedure for her C-spine this upcoming week.  Patient also has good follow-up with her PCP.  Patient is stable to discharge home today with follow-up with PCP and her cardiologist as outpatient.        Recommendation for Outpatient Providers:     Follow with PCP and cardiology        Reasons For Change In Medications and Indications for New Medications:    No significant changes    Day of Discharge     HPI:   Patient seen and examined the day of discharge.  States her right upper back pain and right lower lateral rib pain is still there but improved.  It is chronic in nature but acutely worsened for the past 2 days.  She denies any further chest pain.  Follows regularly with her cardiologist in [place].  She is also due for another procedure for her C-spine next week.  States she does have some Lortab at home but has not tried it, her pain significantly improved with Norco here.  Patient to continue taking Lortab and Flexeril as needed at home.  Wants to go home today.    Vital Signs:   Temp:  [97.7 °F (36.5 °C)-98.5 °F (36.9 °C)] 98 °F (36.7 °C)  Heart Rate:  [70-91] 70  Resp:  [16-18] 16  BP: (114-159)/() 134/88     Physical Exam:  General: Awake, alert, in a c-collar, NAD  Eyes: PERRL, EOMI, conjunctive are clear  Cardiovascular: Regular rate and rhythm, no murmurs  Respiratory: Clear to auscultation bilaterally, no wheezing or rales, unlabored breathing  Abdomen: Soft, nontender, positive bowel sounds, no guarding  Neurologic: A&O, CN grossly intact, moves all extremities  spontaneously  Musculoskeletal: Normal range of motion, tender to palpation of the right lateral thoracic area and lower ribs, no lesions noted  Skin: Warm, dry, intact      Pertinent  and/or Most Recent Results     Results from last 7 days   Lab Units 02/05/21  0046 02/05/21  0045 02/04/21  1506   WBC 10*3/mm3 7.20  --  10.80   HEMOGLOBIN g/dL 13.6  --  13.7   HEMATOCRIT % 40.9  --  40.3   PLATELETS 10*3/mm3 131*  --  165   SODIUM mmol/L  --  138 135*   POTASSIUM mmol/L  --  3.9 4.4   CHLORIDE mmol/L  --  102 102   CO2 mmol/L  --  23.0 22.0   BUN mg/dL  --  19 20   CREATININE mg/dL  --  0.88 0.81   GLUCOSE mg/dL  --  291* 261*   CALCIUM mg/dL  --  9.0 9.4     Results from last 7 days   Lab Units 02/05/21  0832 02/04/21  1506   BILIRUBIN mg/dL 0.3 0.3   ALK PHOS U/L 100 115   ALT (SGPT) U/L 16 18   AST (SGOT) U/L 18 22           Invalid input(s): TG, LDLCALC, LDLREALC  Results from last 7 days   Lab Units 02/05/21  0832 02/05/21  0046 02/05/21  0045 02/04/21 2008 02/04/21  1506   TSH uIU/mL  --   --  1.850  --   --    HEMOGLOBIN A1C %  --  8.7*  --   --   --    PROBNP pg/mL  --   --  288.1  --  346.5   TROPONIN T ng/mL <0.010  --  <0.010 <0.010 <0.010       Brief Urine Lab Results  (Last result in the past 365 days)      Color   Clarity   Blood   Leuk Est   Nitrite   Protein   CREAT   Urine HCG        09/19/20 0839 Yellow Clear Negative Small (1+) Negative Negative         09/19/20 0839               Negative           Microbiology Results Abnormal     Procedure Component Value - Date/Time    COVID-19,APTIMA PANTHER,ELISHA IN-HOUSE, NP/OP SWAB IN UTM/VTM/SALINE TRANSPORT MEDIA,24 HR TAT - Swab, Nasopharynx [[id number]]  (Normal) Collected: 02/04/21 1728    Lab Status: Final result Specimen: Swab from Nasopharynx Updated: 02/05/21 0330     COVID19 Not Detected    Narrative:      Fact sheet for providers: https://www.fda.gov/media/342916/download     Fact sheet for patients:  https://www.fda.gov/media/940426/download    Test performed by RT PCR.          Xr Spine Thoracic 3 View    Result Date: 2/4/2021  Impression: No significant change in appearance of the thoracic spine since outside CT exam with minimal compression of the superior endplate of T9 and mild anterior wedging at T12. Osteopenia.  Electronically Signed By-Kim Burkett MD On:2/4/2021 3:15 PM This report was finalized on [id number] by  Kim Burkett MD.    Ct Cervical Spine Without Contrast    Result Date: 1/29/2021  Impression:  IMPRESSION: 1. Status post ACDF at C3-C6  with hardware intact. 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 2. No findings of high-grade foraminal or canal stenosis. 3. No acute osseous abnormality.  Electronically Signed By-Westley Kirkpatrick MD On:1/29/2021 2:00 PM This report was finalized on [id number] by  Westley Kirkpatrick MD.    Xr Chest 1 View    Result Date: 2/4/2021  Impression: 1. Stable cardiomegaly. 2. No acute process.  Electronically Signed By-Westley Kirkpatrick MD On:2/4/2021 3:08 PM This report was finalized on [id number] by  Westley Kirkpatrick MD.    Ct Chest Pulmonary Embolism    Result Date: 2/4/2021  Impression: No evidence of pulmonary embolic disease. Prominent pulmonary arteries with pulmonary valve prosthesis. Chronic appearing interstitial changes in the lungs are stable.  Electronically Signed By-Rocky Heredia MD On:2/4/2021 8:58 PM This report was finalized on [id number] by  Rocky Heredia MD.    Dexa Bone Density Axial    Result Date: 1/29/2021  Impression: Osteoporosis  Copies of the computerized summary reports can be obtained from [employer] via the health information department of [facility].  Electronically Signed By-Jc Capone DO On:1/29/2021 11:31 AM This report was finalized on [id number] by  Jc Capone DO.                             Test Results Pending at Discharge        Procedures Performed           Consults:   Consults     Date and Time Order Name Status  "Description    2/4/2021 2128 Hospitalist (on-call MD unless specified) Completed             Discharge Details        Discharge Medications      Changes to Medications      Instructions Start Date   aspirin 81 MG chewable tablet  What changed: additional instructions   81 mg, Oral, Daily      rosuvastatin 10 MG tablet  Commonly known as: CRESTOR  What changed:   · how much to take  · how to take this  · when to take this  · additional instructions   Take one daily         Continue These Medications      Instructions Start Date   Accu-Chek Araceli Plus w/Device kit   Use as directed   DX  E11.9      Accu-Chek FastClix Lancets misc   For finger stick 4x/d      Alcohol Swabs 70 % pads   Use tid      cyclobenzaprine 10 MG tablet  Commonly known as: FLEXERIL   10 mg, Oral, 3 Times Daily PRN      FreeStyle Rajeev 14 Day Sensor misc   1 each, Does not apply, Every 14 Days      glucose blood test strip  Commonly known as: Accu-Chek Araceli Plus   To check blood sugar 4x/d      insulin NPH-insulin regular (70-30) 100 UNIT/ML injection  Commonly known as: humuLIN 70/30,novoLIN 70/30   40 Units, Subcutaneous, Every Morning      insulin NPH-insulin regular (70-30) 100 UNIT/ML injection  Commonly known as: humuLIN 70/30,novoLIN 70/30   30 Units, Subcutaneous, Every Evening      Insulin Syringe 31G X 5/16\" 1 ML misc   1 syringe, Does not apply, 2 Times Daily      lisinopril 20 MG tablet  Commonly known as: PRINIVIL,ZESTRIL   20 mg, Oral, Daily             Allergies   Allergen Reactions   • Promethazine Mental Status Change   • Tape Other (See Comments)     .blisters           Discharge Disposition:  Home or Self Care    Diet:  Hospital:  Diet Order   Procedures   • Diet Cardiac, Diabetic/Consistent Carbs; Healthy Heart; Diabetic - Consistent Carb         Discharge Activity:         CODE STATUS:    Code Status and Medical Interventions:   Ordered at: 02/04/21 2150     Code Status:    CPR     Medical Interventions (Level of Support " Prior to Arrest):    Full         Follow-up Appointments  Future Appointments   Date Time Provider Department Center   2/8/2021 10:50 AM LAB  [facility] DS  [facility] None   2/12/2021 11:25 AM LAB MD [facility] LAB NA  [facility]   2/12/2021 11:30 AM Мария Nunez MD [facility] ONC NA [facility]   2/15/2021 10:45 AM Lopez Rye MD [facility] END NA None   2/17/2021 12:00 PM Floresita Contreras APRN [facility]URSUR[facility]             Condition on Discharge:      Stable      This patient has been examined wearing appropriate Personal Protective Equipment on 02/05/21      Electronically signed by Xenia Martel DO, 02/05/21, 11:28 AM EST.      Time: I spent  22  minutes on this discharge activity which included face-to-face encounter with the patient/reviewing the data in the system/coordination of the care with the nursing staff as well as consultants/documentation/entering orders.

## 2021-02-05 NOTE — PLAN OF CARE
Problem: Adult Inpatient Plan of Care  Goal: Plan of Care Review  Outcome: Ongoing, Progressing  Flowsheets (Taken 2/4/2021 8901)  Progress: no change  Plan of Care Reviewed With: patient  Outcome Summary: Pt arrived from ED  Goal: Patient-Specific Goal (Individualized)  Outcome: Ongoing, Progressing  Goal: Absence of Hospital-Acquired Illness or Injury  Outcome: Ongoing, Progressing  Goal: Optimal Comfort and Wellbeing  Outcome: Ongoing, Progressing  Goal: Readiness for Transition of Care  Outcome: Ongoing, Progressing     Problem: Chest Pain  Goal: Resolution of Chest Pain Symptoms  Outcome: Ongoing, Progressing   Goal Outcome Evaluation:  Plan of Care Reviewed With: patient  Progress: no change  Outcome Summary: Pt arrived from ED

## 2021-02-05 NOTE — ED PROVIDER NOTES
Previous HPI, PE, ROS, and MDM performed per previous provider.  Care was assumed pending CT results and disposition.  CT showed no acute cardiopulmonary abnormalities.  Upon reassessment, patient reports that she is having worsening chest heaviness.  Nitroglycerin 0.4 mg sublingual trial given.  The hospitalist was paged for admission.  Spoke with CIRO Cruz who accepted admission on behalf of Dr. Maretl.     Xr Spine Thoracic 3 View    Result Date: 2/4/2021  No significant change in appearance of the thoracic spine since outside CT exam with minimal compression of the superior endplate of T9 and mild anterior wedging at T12. Osteopenia.  Electronically Signed By-Kim Burkett MD On:2/4/2021 3:15 PM This report was finalized on [id number] by  Kim Burkett MD.    Xr Chest 1 View    Result Date: 2/4/2021  1. Stable cardiomegaly. 2. No acute process.  Electronically Signed By-Westley Kirkpatrick MD On:2/4/2021 3:08 PM This report was finalized on [id number] by  Westley Kirkpatrick MD.    Ct Chest Pulmonary Embolism    Result Date: 2/4/2021  No evidence of pulmonary embolic disease. Prominent pulmonary arteries with pulmonary valve prosthesis. Chronic appearing interstitial changes in the lungs are stable.  Electronically Signed By-Rocky Heredia MD On:2/4/2021 8:58 PM This report was finalized on [id number] by  Rocky Heredia MD.    Medications   sodium chloride 0.9 % flush 10 mL (has no administration in time range)   lidocaine (LIDODERM) 5 % 1 patch (1 patch Transdermal Medication Applied 2/4/21 1514)   nitroglycerin (NITROSTAT) SL tablet 0.4 mg (has no administration in time range)   ketorolac (TORADOL) injection 15 mg (15 mg Intravenous Given 2/4/21 1515)   methocarbamol (ROBAXIN) tablet 750 mg (750 mg Oral Given 2/4/21 1515)   HYDROcodone-acetaminophen (NORCO) 5-325 MG per tablet 1 tablet (1 tablet Oral Given 2/4/21 1720)   aspirin chewable tablet 324 mg (324 mg Oral Given 2/4/21 1720)   iopamidol (ISOVUE-370) 76 %  injection 100 mL (100 mL Intravenous Given 2/4/21 2053)     Labs Reviewed   COMPREHENSIVE METABOLIC PANEL - Abnormal; Notable for the following components:       Result Value    Glucose 261 (*)     Sodium 135 (*)     All other components within normal limits    Narrative:     GFR Normal >60  Chronic Kidney Disease <60  Kidney Failure <15     D-DIMER, QUANTITATIVE - Abnormal; Notable for the following components:    D-Dimer, Quantitative 0.81 (*)     All other components within normal limits    Narrative:     Reference Range  --------------------------------------------------------------------     < 0.50   Negative Predictive Value  0.50-0.59   Indeterminate    >= 0.60   Probable VTE             A very low percentage of patients with DVT may yield D-Dimer results   below the cut-off of 0.50 mg/L FEU.  This is known to be more   prevalent in patients with distal DVT.             Results of this test should always be interpreted in conjunction with   the patient's medical history, clinical presentation and other   findings.  Clinical diagnosis should not be based on the result of   INNOVANCE D-Dimer alone.   CBC WITH AUTO DIFFERENTIAL - Abnormal; Notable for the following components:    Neutrophil % 78.7 (*)     Lymphocyte % 14.8 (*)     Neutrophils, Absolute 8.50 (*)     All other components within normal limits   TROPONIN (IN-HOUSE) - Normal    Narrative:     Troponin T Reference Range:  <= 0.03 ng/mL-   Negative for AMI  >0.03 ng/mL-     Abnormal for myocardial necrosis.  Clinicians would have to utilize clinical acumen, EKG, Troponin and serial changes to determine if it is an Acute Myocardial Infarction or myocardial injury due to an underlying chronic condition.       Results may be falsely decreased if patient taking Biotin.     TROPONIN (IN-HOUSE) - Normal    Narrative:     Troponin T Reference Range:  <= 0.03 ng/mL-   Negative for AMI  >0.03 ng/mL-     Abnormal for myocardial necrosis.  Clinicians would have  to utilize clinical acumen, EKG, Troponin and serial changes to determine if it is an Acute Myocardial Infarction or myocardial injury due to an underlying chronic condition.       Results may be falsely decreased if patient taking Biotin.     BNP (IN-HOUSE) - Normal    Narrative:     Among patients with dyspnea, NT-proBNP is highly sensitive for the detection of acute congestive heart failure. In addition NT-proBNP of <300 pg/ml effectively rules out acute congestive heart failure with 99% negative predictive value.    Results may be falsely decreased if patient taking Biotin.     COVID-19,APTIMA TUTU BLOCKU IN-HOUSE,NP/OP SWAB IN UTM/VTM/SALINE TRANSPORT MEDIA,24 HR TAT   CBC AND DIFFERENTIAL    Narrative:     The following orders were created for panel order CBC & Differential.  Procedure                               Abnormality         Status                     ---------                               -----------         ------                     CBC Auto Differential[[id number]]        Abnormal            Final result                 Please view results for these tests on the individual orders.   EXTRA TUBES    Narrative:     The following orders were created for panel order Extra Tubes.  Procedure                               Abnormality         Status                     ---------                               -----------         ------                     Gold Top - SST[[id number]]                                   Final result                 Please view results for these tests on the individual orders.   GOLD TOP - SST          Elyse Rangel NP  02/04/21 4405

## 2021-02-05 NOTE — PROGRESS NOTES
Case Management Discharge Note                Selected Continued Care - Discharged on 2/5/2021 Admission date: 2/4/2021 - Discharge disposition: Home or Self Care                 Final Discharge Disposition Code: 01 - home or self-care

## 2021-02-05 NOTE — H&P
[facility]      Patient Name: Gladys Garrison  : 1960  MRN: [id number]  Primary Care Physician: Ignacia Lundy MD (Inactive)  Date of admission: 2021    Patient Care Team:  Ignacia Lundy MD (Inactive) as PCP - General (Internal Medicine)          Subjective   History Present Illness     Chief Complaint:   Chief Complaint   Patient presents with   • Back Pain     back pain, taking otc medicatons not helping. middle to low back right side.  Pt now states she is having chest pain down left arm  due to all the pain she is in.       Ms. Garrison is a 60 y.o. female who presents to [facility] ED with a history of tetralogy of Fallot, diabetes mellitus type 2, hepatitis C, breast cancer, hypertension, and hyperlipidemia complaining of chest pain.  Patient states she was at work and began to have a dull aching right-sided back pain so she took 3 Tylenol.  She applied icy hot to the area and found no relief.  Patient took 3 more Tylenol and added IcyHot again.  She still found no relief so she took 3 more Tylenol and added IcyHot again to the area.  She describes her pain as a 6 out of 10 and states that it radiates from her back across her chest to her left arm.  Due to patient's cardiac history, cardiac work-up pursued by ER.  Additionally patient states she is having some nausea.  Of note, patient wearing c-collar from neck surgery 4 months ago.    In the ED, troponin negative, .5, D-dimer 0.81,  glucose 261.  Ventral paced rhythm rate of 81.  CT chest PE protocol shows no evidence of pulmonary embolism but prominent pulmonary arteries with pulmonary valve prosthesis chronic appearing interstitial changes in the lungs which are stable.  X-ray thoracic spine shows no significant change in appearance of the thoracic spine since outside CT exam with minimal compression of the superior endplate of T9 and mild anterior wedging at T12.  Cervical  hardware in place.  Patient admitted for further evaluation and treatment.      Review of Systems   Constitution: Negative for chills and fever.   Cardiovascular: Positive for chest pain.   Respiratory: Negative for shortness of breath.    Musculoskeletal: Positive for back pain.        Left arm pain   Gastrointestinal: Positive for nausea. Negative for abdominal pain and vomiting.   Neurological: Positive for focal weakness, numbness and sensory change.   All other systems reviewed and are negative.        Personal History     Past Medical History:   Past Medical History:   Diagnosis Date   • Allergic    • Breast cancer (CMS/HCC) 2017    mets to lymph nodes; did not do radiation   • Cancer of unknown origin (CMS/HCC)    • Compression fx, thoracic spine, open, initial encounter (CMS/HCC)    • Coronary artery disease    • Diabetes mellitus (CMS/HCC)    • Heart disease, unspecified    • Hepatitis C    • Hypertension    • Sleep apnea     no machine   • Type 2 diabetes mellitus (CMS/HCC) 2017       Surgical History:      Past Surgical History:   Procedure Laterality Date   • BACK SURGERY      neck   • BREAST RECONSTRUCTION Bilateral    • CARDIAC ABLATION       atrial tachycardia x 5 ablations    • CARDIAC CATHETERIZATION     • CARDIAC SURGERY      stent placed in aorta   • CARDIAC SURGERY      6 surgeries as baby    • CERVICAL FUSION ANTERIOR WITH ARTIFICIAL DISCECTOMY IMPLANTATION N/A 2020    Procedure: C4 VERTEBRECTOMY AND ANTERIOR CERIVCAL DISCECTOMY WITH FUSION OF CERVICAL THREE THROUGH FIVE WITH REMOVAL OF HARDWARE C5-C6;  Surgeon: Mark Kaba MD;  Location: [facility] MAIN OR;  Service: Neurosurgery;  Laterality: N/A;   •  SECTION      x2   • COLONOSCOPY N/A 10/23/2020    Procedure: COLONOSCOPY WITH POLYPECTOMY X6;  Surgeon: Stanley Bourne MD;  Location: [facility] ENDOSCOPY;  Service: Gastroenterology;  Laterality: N/A;  POLYPS, INTERNAL HEMORRHOIDS   • ENDOSCOPY N/A 10/23/2020     Procedure: ESOPHAGOGASTRODUODENOSCOPY WITH BIOPSY X 1 AREA;  Surgeon: Stanley Bourne MD;  Location: [facility] ENDOSCOPY;  Service: Gastroenterology;  Laterality: N/A;  GASTRITIS, ESOPHAGITIS, HIATAL HERNIA   • KNEE SURGERY  2000   • MASTECTOMY Bilateral    • NECK SURGERY     • PACEMAKER IMPLANTATION         Family History: family history includes Cancer in her maternal aunt and mother; Diabetes in her sister; Diabetes type I in her half-sister; Heart disease in her mother; Lymphoma in her mother; No Known Problems in her brother and brother; Other in her father; Stroke in her mother; Thyroid cancer in her half-sister; Thyroid disease in her sister. Otherwise pertinent FHx was reviewed and unremarkable.     Social History:  reports that she has never smoked. She has never used smokeless tobacco. She reports current alcohol use of about 1.0 standard drinks of alcohol per week. She reports previous drug use.      Medications:  Prior to Admission medications    Medication Sig Start Date End Date Taking? Authorizing Provider   Accu-Chek FastClix Lancets misc For finger stick 4x/d 8/19/20   Lopez Rey MD   Alcohol Swabs 70 % pads Use tid 8/12/20   Kourtney Coffman APRN   aspirin 81 MG chewable tablet Chew 1 tablet Daily.  Patient taking differently: Chew 81 mg Daily. Hold DOS 12/24/19   Cedric Duval Jr., MD   Blood Glucose Monitoring Suppl (ACCU-CHEK TEODORO PLUS) w/Device kit Use as directed   DX  E11.9 7/31/20   Kourtney Coffman APRN   cephalexin (KEFLEX) 500 MG capsule Take 1 capsule by mouth 3 (Three) Times a Day. 11/3/20   Fanta Alonso PA   Continuous Blood Gluc Sensor (FreeStyle Rajeev 14 Day Sensor) misc 1 each Every 14 (Fourteen) Days. 10/19/20   Ignacia Lundy MD   cyclobenzaprine (FLEXERIL) 10 MG tablet Take 1 tablet by mouth 3 (Three) Times a Day As Needed for Muscle Spasms. 12/2/20   Floresita Contreras APRN   Epclusa 400-100 MG tablet Take 1 tablet by mouth Daily. At 6 pm   ( for  "total 12 weeks ) 9/28/20   ProviderMagdalene MD   glucose blood (Accu-Chek Araceli Plus) test strip To check blood sugar 4x/d 8/19/20   Lopez Rey MD   HYDROcodone-acetaminophen (NORCO) 5-325 MG per tablet Take 1 tablet by mouth Every 6 (Six) Hours As Needed for Severe Pain . 10/16/20   Floresita Contreras APRN   insulin NPH-insulin regular (humuLIN 70/30,novoLIN 70/30) (70-30) 100 UNIT/ML injection Inject 40 Units under the skin into the appropriate area as directed Every Morning.  Patient taking differently: Inject 40 Units under the skin into the appropriate area as directed Every Morning. Hold DOS 8/19/20   Lopez Rey MD   Insulin Syringe 31G X 5/16\" 1 ML misc 1 syringe 2 (Two) Times a Day. 1/10/20   Ignacia Lundy MD   lisinopril (PRINIVIL,ZESTRIL) 20 MG tablet Take 20 mg by mouth Daily.    ProviderMagdalene MD   rosuvastatin (CRESTOR) 10 MG tablet Take one daily  Patient taking differently: Take 10 mg by mouth Every Night. 8/19/20   Lopez Rey MD       Allergies:    Allergies   Allergen Reactions   • Promethazine Mental Status Change   • Tape Other (See Comments)     .blisters         Objective   Objective     Vital Signs  Temp:  [98.5 °F (36.9 °C)] 98.5 °F (36.9 °C)  Heart Rate:  [77-91] 77  Resp:  [18] 18  BP: (114-159)/() 153/65  SpO2:  [97 %-99 %] 98 %  on   ;      Body mass index is 31.08 kg/m².    Physical Exam  Vitals signs reviewed.   Constitutional:       Appearance: Normal appearance. She is obese.   HENT:      Head: Normocephalic and atraumatic.      Nose: Nose normal.      Mouth/Throat:      Mouth: Mucous membranes are moist.      Pharynx: Oropharynx is clear.   Eyes:      Pupils: Pupils are equal, round, and reactive to light.   Neck:      Comments: C-collar in place  Cardiovascular:      Rate and Rhythm: Normal rate and regular rhythm.      Pulses: Normal pulses.      Heart sounds: Murmur present.   Pulmonary:      Effort: Pulmonary effort is normal.      " Breath sounds: Normal breath sounds.   Abdominal:      General: Bowel sounds are normal.      Palpations: Abdomen is soft.   Musculoskeletal: Normal range of motion.   Skin:     General: Skin is warm and dry.      Capillary Refill: Capillary refill takes less than 2 seconds.   Neurological:      General: No focal deficit present.      Mental Status: She is alert and oriented to person, place, and time.   Psychiatric:         Mood and Affect: Mood normal.         Behavior: Behavior normal.         Thought Content: Thought content normal.         Judgment: Judgment normal.         Results Review:  I have personally reviewed most recent cardiac tracings, lab results and radiology images and interpretations and agree with findings.      Results from last 7 days   Lab Units 02/04/21  1506   WBC 10*3/mm3 10.80   HEMOGLOBIN g/dL 13.7   HEMATOCRIT % 40.3   PLATELETS 10*3/mm3 165     Results from last 7 days   Lab Units 02/04/21 2008 02/04/21  1506   SODIUM mmol/L  --  135*   POTASSIUM mmol/L  --  4.4   CHLORIDE mmol/L  --  102   CO2 mmol/L  --  22.0   BUN mg/dL  --  20   CREATININE mg/dL  --  0.81   GLUCOSE mg/dL  --  261*   CALCIUM mg/dL  --  9.4   ALT (SGPT) U/L  --  18   AST (SGOT) U/L  --  22   TROPONIN T ng/mL <0.010 <0.010   PROBNP pg/mL  --  346.5     Estimated Creatinine Clearance: 69.9 mL/min (by C-G formula based on SCr of 0.81 mg/dL).  Brief Urine Lab Results  (Last result in the past 365 days)      Color   Clarity   Blood   Leuk Est   Nitrite   Protein   CREAT   Urine HCG        09/19/20 0839 Yellow Clear Negative Small (1+) Negative Negative         09/19/20 0839               Negative           Microbiology Results (last 10 days)     ** No results found for the last 240 hours. **          ECG/EMG Results (most recent)     Procedure Component Value Units Date/Time    ECG 12 Lead [[id number]] Collected: 02/04/21 1536     Updated: 02/04/21 1539     QT Interval 463 ms     Narrative:      HEART RATE= 81  bpm  RR  Interval= 736  ms  SD Interval= 205  ms  P Horizontal Axis= 3  deg  P Front Axis= 60  deg  QRSD Interval= 115  ms  QT Interval= 463  ms  QRS Axis= 148  deg  T Wave Axis= 123  deg  - ABNORMAL ECG -  Ventricular-paced rhythm  Electronically Signed By:   Date and Time of Study: 2021-02-04 15:36:24              Xr Spine Thoracic 3 View    Result Date: 2/4/2021  No significant change in appearance of the thoracic spine since outside CT exam with minimal compression of the superior endplate of T9 and mild anterior wedging at T12. Osteopenia.  Electronically Signed By-Kim Burkett MD On:2/4/2021 3:15 PM This report was finalized on [id number] by  Kim Burkett MD.    Ct Cervical Spine Without Contrast    Result Date: 1/29/2021   IMPRESSION: 1. Status post ACDF at C3-C6  with hardware intact. 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 2. No findings of high-grade foraminal or canal stenosis. 3. No acute osseous abnormality.  Electronically Signed By-Westley Kirkpatrick MD On:1/29/2021 2:00 PM This report was finalized on [id number] by  Westley Kirkpatrick MD.    Xr Chest 1 View    Result Date: 2/4/2021  1. Stable cardiomegaly. 2. No acute process.  Electronically Signed By-Westley Kirkpatrick MD On:2/4/2021 3:08 PM This report was finalized on [id number] by  Westley Kirkpatrick MD.    Ct Chest Pulmonary Embolism    Result Date: 2/4/2021  No evidence of pulmonary embolic disease. Prominent pulmonary arteries with pulmonary valve prosthesis. Chronic appearing interstitial changes in the lungs are stable.  Electronically Signed By-Rocky Heredia MD On:2/4/2021 8:58 PM This report was finalized on [id number] by  Rocky Heredia MD.    Dexa Bone Density Axial    Result Date: 1/29/2021  Osteoporosis  Copies of the computerized summary reports can be obtained from [employer] via the health information department of [facility].  Electronically Signed By-Jc Capone DO On:1/29/2021 11:31 AM This report was finalized on [id number] by  Jc  DO Liborio.        Estimated Creatinine Clearance: 69.9 mL/min (by C-G formula based on SCr of 0.81 mg/dL).    Assessment/Plan   Assessment/Plan       Active Hospital Problems    Diagnosis  POA   • **Back pain [M54.9]  Yes     Priority: High   • Chest pain [R07.9]  Yes     Priority: High   • Obesity (BMI 30-39.9) [E66.9]  Yes   • Cervical myelopathy with cervical radiculopathy (CMS/HCC) [G95.9, M54.12]  Yes   • Controlled type 2 diabetes mellitus without complication, with long-term current use of insulin (CMS/HCC) [E11.9, Z79.4]  Not Applicable   • Tetralogy of Fallot [Q21.3]  Not Applicable   • Pulmonary hypertension (CMS/HCC) [I27.20]  Yes   • Hyperlipidemia [E78.5]  Yes   • Hypertensive disorder [I10]  Yes      Resolved Hospital Problems   No resolved problems to display.     ? Chest pain vs back pain     --Received ASA in ER  -CXR and EKG reviewed  -Troponin negative, trend  -d dimer 0.81  -proBNP 346.5  -TSH  -Continuous cardiac monitoring  -ECHO in am  -Previous Stress 12/22/2019 --Findings consistent with an indeterminate ECG stress test.  Left ventricular ejection fraction is normal (Calculated EF = 56%). Impressions are consistent with a low risk study. Nuclear images reviewed revealing prominent apical slit. Negative for ischemia.  -Consider Cardiology Consult  -HH diet with 2 gm sodium; NPO at MN  -Continue NTG SL PRN for CP if systolic bp > 90  -Continue ASA  -Urine drug screen  -Sees Dr Leventhal at  for cardiac issues    History of cervical fusion, history of thoracic fractures  -Continue Flexeril    Essential Hypertension, Chronic, Controlled  -Continue home aspirin and lisinopril  - Monitor with routine vital signs     Hyperlipidemia  -Continue statin    Diabetes mellitus type 2, chronic  -Glucose 266  -A1C  -Hold PO diabetes medications and home SSI  -Start SSI  -Monitor glucose AC/HS    Obesity  -encourage lifestyle modifications    VTE Prophylaxis -   Mechanical Order History:     SCDs       Pharmalogical Order History:     None          CODE STATUS:    Code Status and Medical Interventions:   Ordered at: 02/04/21 0710     Code Status:    CPR     Medical Interventions (Level of Support Prior to Arrest):    Full       This patient has been examined wearing appropriate Personal Protective Equipment . 02/04/21      I discussed the patient's findings and my recommendations with patient.      Signature:Electronically signed by CIRO Howard, 02/04/21, 9:53 PM EST.      [facility] Hospitalist Team

## 2021-02-08 ENCOUNTER — LAB (OUTPATIENT)
Dept: LAB | Facility: HOSPITAL | Age: 61
End: 2021-02-08

## 2021-02-08 DIAGNOSIS — Z79.4 CONTROLLED TYPE 2 DIABETES MELLITUS WITHOUT COMPLICATION, WITH LONG-TERM CURRENT USE OF INSULIN (HCC): ICD-10-CM

## 2021-02-08 DIAGNOSIS — E55.9 VITAMIN D DEFICIENCY: ICD-10-CM

## 2021-02-08 DIAGNOSIS — E11.9 CONTROLLED TYPE 2 DIABETES MELLITUS WITHOUT COMPLICATION, WITH LONG-TERM CURRENT USE OF INSULIN (HCC): ICD-10-CM

## 2021-02-08 DIAGNOSIS — E78.5 HYPERLIPIDEMIA, UNSPECIFIED HYPERLIPIDEMIA TYPE: ICD-10-CM

## 2021-02-08 LAB
25(OH)D3 SERPL-MCNC: 22.3 NG/ML (ref 30–100)
ALBUMIN SERPL-MCNC: 4.2 G/DL (ref 3.5–5.2)
ALBUMIN/GLOB SERPL: 1.3 G/DL
ALP SERPL-CCNC: 93 U/L (ref 39–117)
ALT SERPL W P-5'-P-CCNC: 15 U/L (ref 1–33)
ANION GAP SERPL CALCULATED.3IONS-SCNC: 11.1 MMOL/L (ref 5–15)
AST SERPL-CCNC: 16 U/L (ref 1–32)
BILIRUB SERPL-MCNC: 0.4 MG/DL (ref 0–1.2)
BUN SERPL-MCNC: 15 MG/DL (ref 6–20)
BUN/CREAT SERPL: 22.7 (ref 7–25)
CALCIUM SPEC-SCNC: 9.5 MG/DL (ref 8.6–10.5)
CHLORIDE SERPL-SCNC: 101 MMOL/L (ref 98–107)
CO2 SERPL-SCNC: 22.9 MMOL/L (ref 22–29)
CREAT SERPL-MCNC: 0.66 MG/DL (ref 0.57–1)
GFR SERPL CREATININE-BSD FRML MDRD: 92 ML/MIN/1.73
GLOBULIN UR ELPH-MCNC: 3.3 GM/DL
GLUCOSE SERPL-MCNC: 232 MG/DL (ref 65–99)
POTASSIUM SERPL-SCNC: 4.1 MMOL/L (ref 3.5–5.2)
PROT SERPL-MCNC: 7.5 G/DL (ref 6–8.5)
SODIUM SERPL-SCNC: 135 MMOL/L (ref 136–145)

## 2021-02-08 PROCEDURE — 86341 ISLET CELL ANTIBODY: CPT

## 2021-02-08 PROCEDURE — 84681 ASSAY OF C-PEPTIDE: CPT

## 2021-02-08 PROCEDURE — 82306 VITAMIN D 25 HYDROXY: CPT

## 2021-02-08 PROCEDURE — 80053 COMPREHEN METABOLIC PANEL: CPT

## 2021-02-08 PROCEDURE — 36415 COLL VENOUS BLD VENIPUNCTURE: CPT

## 2021-02-09 ENCOUNTER — TELEPHONE (OUTPATIENT)
Dept: FAMILY MEDICINE CLINIC | Facility: CLINIC | Age: 61
End: 2021-02-09

## 2021-02-09 LAB
C PEPTIDE SERPL-MCNC: 2.1 NG/ML (ref 1.1–4.4)
QT INTERVAL: 463 MS

## 2021-02-09 NOTE — TELEPHONE ENCOUNTER
Caller: Gladys Garrison    Relationship: Self    Best call back number: [phone]    What medication are you requesting: IBUPROFEN 800MG- SOMETHING FOR SEVERE BACK PAIN    What are your current symptoms: CONSTANT  BACK PAIN ON RIGHT/UPPER     Have you had these symptoms before:    [x] Yes  [] No    Have you been treated for these symptoms before:   [x] Yes  [] No    If a prescription is needed, what is your preferred pharmacy and phone number:  [facility]  [place], [place] - [address]  - [phone] [facility] [phone]         Additional notes: PATIENT IS HAVING SEVERE BACK PAIN. PATIENT IS NEEDING AN ANTI INFLAMMATORY AND TO SPEAK WITH A PROVIDER.

## 2021-02-10 LAB — GAD65 AB SER IA-ACNC: <5 U/ML (ref 0–5)

## 2021-02-11 NOTE — PROGRESS NOTES
Hematology/Oncology Outpatient Follow Up    PATIENT NAME:Gladys Garrison  :1960  MRN: [id number]  PRIMARY CARE PHYSICIAN: Ignacia Lundy MD (Inactive)  REFERRING PHYSICIAN: Provider, No Known    Chief Complaint   Patient presents with   • Appointment     Malignant neoplasm of female breast, unspecified estrogen receptor status, unspecified laterality, unspecified site of breast (CMS/HCC)   • Follow-up        HISTORY OF PRESENT ILLNESS:     This is a 60-year-old female who multiple comorbidities including congenital abnormalities such as hypoplastic kidney, tetralogy of Fallot, coarctation of the aorta and congenital VSD status post repair.  Patient developed syncopal episode and for that reason she had she had a CT scan of the chest which showed mass in the left breast.  She had diagnostic mammogram and ultrasound which showed a 2 cm spiculated mass in the left breast at the 1 o'clock position 6 cm from the nipple.  Biopsy of the left breast mass revealed invasive moderately differentiated carcinoma ER/NM positive and HER-2/bishop negative.  Patient also had an ultrasound of the axilla which was concerning for an abnormal left axillary lymph node with cortical thickening.  She apparently had an FNA of the left axillary nodule which was positive for malignancy.  On 2017 patient underwent left modified radical mastectomy, right prophylactic mastectomy and immediate breast reconstruction.  She also underwent right prophylactic mastectomy.  We have had her on records suggest that patient did have multifocal disease with pT2 pN1 aM0.  The largest focus measured 3.5 cm.  2 of 11 lymph nodes were positive for metastatic disease some with extracapsular extension.  Notes that patient did receive Arimidex neoadjuvant  from 2017 to 2018 prior to her bilateral mastectomies.    Review of her note suggest that she developed cough which she attributed to anastrozole and patient was then placed on  tamoxifen.  She had MammaPrint testing which returned with low risk for relapse.    Her postop course was also complicated by left chest wall abscess which resulted in I&D and removal of the left tissue expander. She was referred for radiation treatment boards ultimately declined.    Patient was then placed on tamoxifen in April 2018 which she stopped after less than a month due to nausea and vomiting.  Patient in the interim also was diagnosed with chronic hepatitis C was seen by the hepatologist.  Tamoxifen was dose reduced to 10 mg daily with the goal to increase to 20 mg daily.      Patient has relocated to [facility].  She has transitioned her care to us now.  She is currently not on any antiestrogen therapy.     Her bilateral mastectomy specimen are available for review    · 1/29/2021 patient had bone density which showed osteoporosis      Past Medical History:   Diagnosis Date   • Allergic    • Breast cancer (CMS/HCC) 2017    mets to lymph nodes; did not do radiation   • Cancer of unknown origin (CMS/HCC)    • Compression fx, thoracic spine, open, initial encounter (CMS/HCC)    • Coronary artery disease    • Diabetes mellitus (CMS/HCC)    • Heart disease, unspecified    • Hepatitis C    • Hypertension    • Obesity (BMI 30-39.9) 2/5/2021   • Sleep apnea     no machine   • Type 2 diabetes mellitus (CMS/HCC) 11/2017       Past Surgical History:   Procedure Laterality Date   • BACK SURGERY      neck   • BREAST RECONSTRUCTION Bilateral    • CARDIAC ABLATION       atrial tachycardia x 5 ablations    • CARDIAC CATHETERIZATION     • CARDIAC SURGERY      stent placed in aorta   • CARDIAC SURGERY      6 surgeries as baby    • CERVICAL FUSION ANTERIOR WITH ARTIFICIAL DISCECTOMY IMPLANTATION N/A 9/22/2020    Procedure: C4 VERTEBRECTOMY AND ANTERIOR CERIVCAL DISCECTOMY WITH FUSION OF CERVICAL THREE THROUGH FIVE WITH REMOVAL OF HARDWARE C5-C6;  Surgeon: Mark Kaba MD;  Location: [facility] MAIN OR;  Service:  "Neurosurgery;  Laterality: N/A;   •  SECTION      x2   • COLONOSCOPY N/A 10/23/2020    Procedure: COLONOSCOPY WITH POLYPECTOMY X6;  Surgeon: Stanley Bourne MD;  Location: [facility] ENDOSCOPY;  Service: Gastroenterology;  Laterality: N/A;  POLYPS, INTERNAL HEMORRHOIDS   • ENDOSCOPY N/A 10/23/2020    Procedure: ESOPHAGOGASTRODUODENOSCOPY WITH BIOPSY X 1 AREA;  Surgeon: Stanley Bourne MD;  Location: [facility] ENDOSCOPY;  Service: Gastroenterology;  Laterality: N/A;  GASTRITIS, ESOPHAGITIS, HIATAL HERNIA   • KNEE SURGERY     • MASTECTOMY Bilateral    • NECK SURGERY     • PACEMAKER IMPLANTATION           Current Outpatient Medications:   •  Accu-Chek FastClix Lancets misc, For finger stick 4x/d, Disp: 100 each, Rfl: 12  •  Alcohol Swabs 70 % pads, Use tid, Disp: 300 each, Rfl: 2  •  aspirin 81 MG chewable tablet, Chew 1 tablet Daily. (Patient taking differently: Chew 81 mg Daily. Hold DOS), Disp: 30 tablet, Rfl: 1  •  Blood Glucose Monitoring Suppl (ACCU-CHEK ARACELI PLUS) w/Device kit, Use as directed   DX  E11.9, Disp: 1 kit, Rfl: 0  •  Continuous Blood Gluc Sensor (FreeStyle Rajeev 14 Day Sensor) misc, 1 each Every 14 (Fourteen) Days., Disp: 6 each, Rfl: 1  •  cyclobenzaprine (FLEXERIL) 10 MG tablet, Take 1 tablet by mouth 3 (Three) Times a Day As Needed for Muscle Spasms., Disp: 90 tablet, Rfl: 0  •  glucose blood (Accu-Chek Araceli Plus) test strip, To check blood sugar 4x/d, Disp: 150 each, Rfl: 12  •  insulin NPH-insulin regular (humuLIN 70/30,novoLIN 70/30) (70-30) 100 UNIT/ML injection, Inject 40 Units under the skin into the appropriate area as directed Every Morning., Disp: , Rfl:   •  insulin NPH-insulin regular (humuLIN 70/30,novoLIN 70/30) (70-30) 100 UNIT/ML injection, Inject 30 Units under the skin into the appropriate area as directed Every Evening., Disp: , Rfl:   •  Insulin Syringe 31G X 5/16\" 1 ML misc, 1 syringe 2 (Two) Times a Day., Disp: 100 each, Rfl: 1  •  lisinopril " (PRINIVIL,ZESTRIL) 20 MG tablet, Take 20 mg by mouth Daily., Disp: , Rfl:   •  rosuvastatin (CRESTOR) 10 MG tablet, Take one daily (Patient taking differently: Take 10 mg by mouth Every Night.), Disp: , Rfl:     Allergies   Allergen Reactions   • Promethazine Mental Status Change   • Tape Other (See Comments)     .blisters         Family History   Problem Relation Age of Onset   • Lymphoma Mother    • Heart disease Mother    • Stroke Mother    • Cancer Mother    • Other Father    • Diabetes Sister    • Thyroid disease Sister    • No Known Problems Brother    • No Known Problems Brother    • Diabetes type I Half-Sister    • Thyroid cancer Half-Sister    • Cancer Maternal Aunt        Cancer-related family history includes Cancer in her maternal aunt and mother; Lymphoma in her mother; Thyroid cancer in her half-sister.    Social History     Tobacco Use   • Smoking status: Never Smoker   • Smokeless tobacco: Never Used   Substance Use Topics   • Alcohol use: Yes     Alcohol/week: 1.0 standard drinks     Types: 1 Glasses of wine per week     Frequency: Monthly or less     Comment: rare   • Drug use: Not Currently       HPI, ROS and PFSH have been reviewed and confirmed on 2/12/2021.     SUBJECTIVE:      Patient has no specific complaints today.        REVIEW OF SYSTEMS:  Review of Systems   Constitutional: Negative for chills and fever.   HENT: Negative for ear pain, mouth sores, nosebleeds and sore throat.    Eyes: Negative for photophobia and visual disturbance.   Respiratory: Negative for wheezing and stridor.    Cardiovascular: Negative for chest pain and palpitations.   Gastrointestinal: Negative for abdominal pain, diarrhea, nausea and vomiting.   Endocrine: Negative for cold intolerance and heat intolerance.   Genitourinary: Negative for dysuria and hematuria.   Musculoskeletal: Positive for arthralgias. Negative for joint swelling and neck stiffness.        Neck pain   Skin: Negative for color change and rash.  "  Neurological: Negative for seizures and syncope.   Hematological: Negative for adenopathy.   Psychiatric/Behavioral: Negative for agitation, confusion and hallucinations.     OBJECTIVE:    Vitals:    02/12/21 1130   BP: 156/92   Pulse: 82   Resp: 18   Temp: 97.1 °F (36.2 °C)   Weight: 72 kg (158 lb 12.8 oz)   Height: 154.9 cm (61\")   PainSc:   6   PainLoc: Generalized  Comment: neck, back     Body mass index is 30 kg/m².    ECOG  (1) Restricted in physically strenuous activity, ambulatory and able to do work of light nature    Physical Exam  Vitals signs and nursing note reviewed.   Constitutional:       General: She is not in acute distress.     Appearance: She is not diaphoretic.   HENT:      Head: Normocephalic and atraumatic.   Eyes:      General: No scleral icterus.        Right eye: No discharge.         Left eye: No discharge.      Conjunctiva/sclera: Conjunctivae normal.   Neck:      Musculoskeletal: Normal range of motion and neck supple.      Thyroid: No thyromegaly.   Cardiovascular:      Rate and Rhythm: Normal rate and regular rhythm.      Heart sounds: Normal heart sounds. No friction rub. No gallop.    Pulmonary:      Effort: Pulmonary effort is normal. No respiratory distress.      Breath sounds: No stridor. No wheezing.   Abdominal:      General: Bowel sounds are normal.      Palpations: Abdomen is soft. There is no mass.      Tenderness: There is no abdominal tenderness. There is no guarding or rebound.   Musculoskeletal: Normal range of motion.         General: No tenderness.      Comments: Neck pain.  Patient has a neck collar.   Lymphadenopathy:      Cervical: No cervical adenopathy.   Skin:     General: Skin is warm.      Findings: No erythema or rash.   Neurological:      Mental Status: She is alert and oriented to person, place, and time.      Motor: No abnormal muscle tone.   Psychiatric:         Behavior: Behavior normal.     I have reexamined the patient and the results are consistent " with the previously documented exam. Мария Lian Nunez MD     RECENT LABS    WBC   Date Value Ref Range Status   02/12/2021 6.56 3.40 - 10.80 10*3/mm3 Final     RBC   Date Value Ref Range Status   02/12/2021 4.83 3.77 - 5.28 10*6/mm3 Final     Hemoglobin   Date Value Ref Range Status   02/12/2021 13.4 12.0 - 15.9 g/dL Final     Hematocrit   Date Value Ref Range Status   02/12/2021 41.5 34.0 - 46.6 % Final     MCV   Date Value Ref Range Status   02/12/2021 85.9 79.0 - 97.0 fL Final     MCH   Date Value Ref Range Status   02/12/2021 27.7 26.6 - 33.0 pg Final     MCHC   Date Value Ref Range Status   02/12/2021 32.3 31.5 - 35.7 g/dL Final     RDW   Date Value Ref Range Status   02/12/2021 13.9 12.3 - 15.4 % Final     RDW-SD   Date Value Ref Range Status   02/12/2021 42.8 37.0 - 54.0 fl Final     MPV   Date Value Ref Range Status   02/12/2021 9.9 6.0 - 12.0 fL Final     Platelets   Date Value Ref Range Status   02/12/2021 145 140 - 450 10*3/mm3 Final     Neutrophil %   Date Value Ref Range Status   02/12/2021 65.1 42.7 - 76.0 % Final     Lymphocyte %   Date Value Ref Range Status   02/12/2021 27.0 19.6 - 45.3 % Final     Monocyte %   Date Value Ref Range Status   02/12/2021 6.7 5.0 - 12.0 % Final     Eosinophil %   Date Value Ref Range Status   02/12/2021 0.9 0.3 - 6.2 % Final     Basophil %   Date Value Ref Range Status   02/12/2021 0.3 0.0 - 1.5 % Final     Immature Grans %   Date Value Ref Range Status   07/20/2020 0.7 (H) 0.0 - 0.5 % Final     Neutrophils, Absolute   Date Value Ref Range Status   02/12/2021 4.27 1.70 - 7.00 10*3/mm3 Final     Lymphocytes, Absolute   Date Value Ref Range Status   02/12/2021 1.77 0.70 - 3.10 10*3/mm3 Final     Monocytes, Absolute   Date Value Ref Range Status   02/12/2021 0.44 0.10 - 0.90 10*3/mm3 Final     Eosinophils, Absolute   Date Value Ref Range Status   02/12/2021 0.06 0.00 - 0.40 10*3/mm3 Final     Basophils, Absolute   Date Value Ref Range Status   02/12/2021 0.02 0.00 -  0.20 10*3/mm3 Final     Immature Grans, Absolute   Date Value Ref Range Status   07/20/2020 0.05 0.00 - 0.05 10*3/mm3 Final     nRBC   Date Value Ref Range Status   02/05/2021 0.1 0.0 - 0.2 /100 WBC Final       Lab Results   Component Value Date    GLUCOSE 232 (H) 02/08/2021    BUN 15 02/08/2021    CREATININE 0.66 02/08/2021    EGFRIFNONA 92 02/08/2021    EGFRIFAFRI >60 10/12/2018    BCR 22.7 02/08/2021    K 4.1 02/08/2021    CO2 22.9 02/08/2021    CALCIUM 9.5 02/08/2021    PROTENTOTREF 7.4 12/14/2020    ALBUMIN 4.20 02/08/2021    LABIL2 0.9 12/14/2020    AST 16 02/08/2021    ALT 15 02/08/2021         Assessment/Plan     Malignant neoplasm of female breast, unspecified estrogen receptor status, unspecified laterality, unspecified site of breast (CMS/HCC)  - CBC & Differential  - anastrozole (Arimidex) 1 MG tablet  - Calcium Carbonate-Vit D-Min (Calcium 600+D Plus Minerals) 600-400 MG-UNIT chewable tablet    Post-menopausal  - anastrozole (Arimidex) 1 MG tablet  - Calcium Carbonate-Vit D-Min (Calcium 600+D Plus Minerals) 600-400 MG-UNIT chewable tablet      ASSESSMENT:    · ypT2 N1 aM0 status post left modified radical mastectomy with lymph node dissection and right prophylactic mastectomy in 2017 performed at [facility].  ER positive, ME positive and HER-2/bishop negative.  Status post bilateral chest wall reconstruction.  According to patient, she tolerated Arimidex very well except that she also had osteoporosis therefore Arimidex was discontinued.  · Intolerance of tamoxifen in the past  · Osteoporosis: We will begin Prolia in order to be able to treat patient with Arimidex as well  · Status post neoadjuvant Arimidex prior to bilateral mastectomies  · Thrombocytopenia: Work-up was - December 2020  · Neck pain status post cervical spine surgery: Patient has a soft neck collar  · Complex cardiac medical history including tetralogy of Fallot, coarctation of aorta, VSD status post repair.  Status  post stent placement for coarctation of aorta  · Personal history and strong family history of breast cancer in multiple in the relatives on paternal side of the family including 4 paternal aunts in their 30s and 40s and 2 maternal aunts at age of 20s and 50s.  There is concern for possible hereditary breast cancer syndrome.  Patient may be  interested in pursuing cancer genetics for her management.        Discussion     Reviewed her medical history, and clinical labs.  Patient will benefit from aromatase inhibitor/antiestrogen therapy for her stage II breast cancer.  Chemotherapy was not offered due to low Oncotype DX assay.  Patient is now willing to try antiestrogen therapy for her disease    She has osteoporosis therefore I recommended Prolia: Side effects discussed and include but not limited to:    Side effects of  Prolia was discussed  and include  bone aches and pains, electrolyte abnormalities including hypophosphatemia, hypocalcemia, low magnesium.  There is also a risk of renal insufficiency, osteonecrosis of the jaw has been observed in clinical studies.  There is risk of peripheral edema, hypertension, dermatitis, nausea, vomiting, and mild hematologic problems including anemia, thrombocytopenia.  Discussed the need for her to have dental evaluation prior to initiating prolia.  Discussed also the need to notify us of any future dental procedures planned.  Also discussed the need to notify us for jaw pain at any point in time.    Begin  Arimidex 1 mg p.o. daily.  Side effects were discussed to include but not limited to:    Side effects of aromatase inhibitors include risk of musculoskeletal side effect including arthralgia, myalgia, especially in patients who have underlying musculoskeletal disease such as osteoarthritis, hot flashes, mood changes. There is risk of vaginal dryness, dyspareunia and other sexual dysfunction. Other side effects include degree of cognitive symptoms compared to women not on  endocrine therapy. There is possibility of fatigue, forgetfulness, poor sleep hygiene, osteoporosis, osteopenia, risk of fractures, cardiovascular disease and hypercholestolemia. There is also possibility of reactivation of ovarian function especially in women who were premenopausal at time of diagnosis and became amenorrheic while on chemotherapy. The duration of treatment is also for minimum of five years and possibly extending therapy in some women with higher risk features beyond five years. We also discussed that the AI have similar efficacy in the adjuvant setting. Therefore, no one AI is preferred over another. I will obtain a bone density at baseline if none has been done and every two years after that. If there is evidence of osteopenia or osteoporosis, there will be recommendation for bisphosphonate therapy for the duration of aromatase inhibitor therapy and possibly longer depending on bone health status at completion of therapy. We also discussed that labs will be done with follow ups and lipid panel will be done on an annual basis.       Plans:     · Reviewed work-up for thrombocytopenia which was negative  · Reviewed her bone density which showed osteoporosis  · Begin Prolia 60 mg subcu every 6 months  · Begin Arimidex 1 mg p.o. daily  · Add Os-Chetan D 600 mg tablets twice a day  · Thrombocytopenia work-up reviewed with patient and negative  · She cannot tolerate tamoxifen.  Explained to patient that we can still treat her with Arimidex  So long as osteoporosis is controlled with medication such as Prolia.  She is willing to retry Arimidex  · Follow-up 3 months  · Patient to follow-up with our spine surgeons           I have reviewed labs results, imaging, vitals, and medications with the patient today.  Will follow up in 3 months with me.        Patient verbalized understanding and is in agreement of the above plan.               I spent 40 total minutes, face-to-face, caring for Gladys today.  90% of  this time involved counseling and/or coordination of care as documented within this note.

## 2021-02-12 ENCOUNTER — OFFICE VISIT (OUTPATIENT)
Dept: ONCOLOGY | Facility: CLINIC | Age: 61
End: 2021-02-12

## 2021-02-12 ENCOUNTER — TELEPHONE (OUTPATIENT)
Dept: NEUROSURGERY | Facility: CLINIC | Age: 61
End: 2021-02-12

## 2021-02-12 ENCOUNTER — LAB (OUTPATIENT)
Dept: LAB | Facility: HOSPITAL | Age: 61
End: 2021-02-12

## 2021-02-12 VITALS
BODY MASS INDEX: 29.98 KG/M2 | DIASTOLIC BLOOD PRESSURE: 92 MMHG | WEIGHT: 158.8 LBS | SYSTOLIC BLOOD PRESSURE: 156 MMHG | HEART RATE: 82 BPM | HEIGHT: 61 IN | RESPIRATION RATE: 18 BRPM | TEMPERATURE: 97.1 F

## 2021-02-12 DIAGNOSIS — C50.919 MALIGNANT NEOPLASM OF FEMALE BREAST, UNSPECIFIED ESTROGEN RECEPTOR STATUS, UNSPECIFIED LATERALITY, UNSPECIFIED SITE OF BREAST (HCC): ICD-10-CM

## 2021-02-12 DIAGNOSIS — Z78.0 POST-MENOPAUSAL: ICD-10-CM

## 2021-02-12 DIAGNOSIS — C50.919 MALIGNANT NEOPLASM OF FEMALE BREAST, UNSPECIFIED ESTROGEN RECEPTOR STATUS, UNSPECIFIED LATERALITY, UNSPECIFIED SITE OF BREAST (HCC): Primary | ICD-10-CM

## 2021-02-12 PROBLEM — M81.0 OSTEOPOROSIS: Status: ACTIVE | Noted: 2021-02-12

## 2021-02-12 LAB
BASOPHILS # BLD AUTO: 0.02 10*3/MM3 (ref 0–0.2)
BASOPHILS NFR BLD AUTO: 0.3 % (ref 0–1.5)
DEPRECATED RDW RBC AUTO: 42.8 FL (ref 37–54)
EOSINOPHIL # BLD AUTO: 0.06 10*3/MM3 (ref 0–0.4)
EOSINOPHIL NFR BLD AUTO: 0.9 % (ref 0.3–6.2)
ERYTHROCYTE [DISTWIDTH] IN BLOOD BY AUTOMATED COUNT: 13.9 % (ref 12.3–15.4)
HCT VFR BLD AUTO: 41.5 % (ref 34–46.6)
HGB BLD-MCNC: 13.4 G/DL (ref 12–15.9)
LYMPHOCYTES # BLD AUTO: 1.77 10*3/MM3 (ref 0.7–3.1)
LYMPHOCYTES NFR BLD AUTO: 27 % (ref 19.6–45.3)
MCH RBC QN AUTO: 27.7 PG (ref 26.6–33)
MCHC RBC AUTO-ENTMCNC: 32.3 G/DL (ref 31.5–35.7)
MCV RBC AUTO: 85.9 FL (ref 79–97)
MONOCYTES # BLD AUTO: 0.44 10*3/MM3 (ref 0.1–0.9)
MONOCYTES NFR BLD AUTO: 6.7 % (ref 5–12)
NEUTROPHILS NFR BLD AUTO: 4.27 10*3/MM3 (ref 1.7–7)
NEUTROPHILS NFR BLD AUTO: 65.1 % (ref 42.7–76)
PLATELET # BLD AUTO: 145 10*3/MM3 (ref 140–450)
PMV BLD AUTO: 9.9 FL (ref 6–12)
RBC # BLD AUTO: 4.83 10*6/MM3 (ref 3.77–5.28)
WBC # BLD AUTO: 6.56 10*3/MM3 (ref 3.4–10.8)

## 2021-02-12 PROCEDURE — 36415 COLL VENOUS BLD VENIPUNCTURE: CPT

## 2021-02-12 PROCEDURE — 99215 OFFICE O/P EST HI 40 MIN: CPT | Performed by: INTERNAL MEDICINE

## 2021-02-12 PROCEDURE — 85025 COMPLETE CBC W/AUTO DIFF WBC: CPT

## 2021-02-12 RX ORDER — MULTIVIT-MIN/FOLIC ACID/LUTEIN 500-250MCG
600 TABLET,CHEWABLE ORAL 2 TIMES DAILY
Qty: 60 EACH | Refills: 6 | Status: SHIPPED | OUTPATIENT
Start: 2021-02-12

## 2021-02-12 RX ORDER — ANASTROZOLE 1 MG/1
1 TABLET ORAL DAILY
Qty: 30 TABLET | Refills: 6 | Status: SHIPPED | OUTPATIENT
Start: 2021-02-12 | End: 2021-06-14

## 2021-02-12 NOTE — TELEPHONE ENCOUNTER
Caller: LUCIEN SARGENT    Relationship to patient: PT    Best call back number: [phone]    Chief complaint: PT DOES NOT HAVE TRANSPORTATION ON 2/17, ASKS IF APPT CAN BE MOVED TO 2/19 WHEN HER  CAN BRING HER?    Type of visit: FOLLOW UP    Requested date: 2/19/21    If rescheduling, when is the original appointment: 2/17/21 12 PM    Additional notes: PT STATES HER  IS OFF WORK ON MONDAYS AND FRIDAYS, ASKS IF CAN BE RESCHEDULED ON ONE OF THOSE DAYS? IF NOT, PT WILL TRY TO FIND A RIDE TO HER ORIGINAL APPT.     PLEASE CALL PT TO ADVISE. THANK YOU.

## 2021-02-19 ENCOUNTER — HOSPITAL ENCOUNTER (OUTPATIENT)
Dept: ONCOLOGY | Facility: HOSPITAL | Age: 61
Setting detail: INFUSION SERIES
Discharge: HOME | End: 2021-02-19

## 2021-02-25 NOTE — PROGRESS NOTES
Subjective   History of Present Illness: Gladys Garrison is a 60 y.o. female being seen for her scheduled postop follow-up.  Patient underwent a C4 vertebrectomy and ACDF of C3-C5 with removal of the C5-C6 hardware with Dr. Kaba on 9/22/2020.    Per review of records patient was last seen in the office on 12/2/2020 for an interim postop follow-up.  I had previously spoke to patient on 11/18/2020 due to patient presenting back to emergency room on 11/3/2020 with complaints of drainage from her cervical incision site.  Patient was afebrile nontoxic appearance, lab results were unremarkable but patient did have elevated glucose.  Patient had some surrounding edema near the incision site imaging was ordered and showed some fat stranding but no signs of an abscess.  There was no hardware complication identified and patient was started on Keflex and discharged. I was concerned with patient's complaints and increase of her radiculopathy and myelopathy complaints.  I was also concerned about the electricity the patient was feeling down into her legs.  I ordered a CT of her spine stat for review of hardware and patient stated she would able to get the CT the following Friday.  We would follow her up in the office next week for further assessment evaluation and patient was a no-show.  She did follow back up on 12/2/2020.  Patient was advised that if she began to have issues controlling her bladder or bowels or further experiencing leg weakness that she is to present to the emergency room due to the concerns of spinal cord compression.  Patient understood.  Patient is doing very well from her ACDF.  She reports no issues other than her chronic arthritic pain.  Patient reports that she went to the emergency room last week at [facility] for what she thought was a kidney stone.  She reports that she has had a new onset of low back pain that is a different pain than what she has had with her chronic pain that began about 5  days ago.  There was no inciting event.  She reports that her pain is in her lower back and off to the right side.  She describes it as a deep bone pain that is much worse at nighttime.  Patient denies any leg pain at this time or or other areas of back pain.  She is scheduled to follow-up with her oncologist, Dr. Elizondo for her history of breast cancer that was diagnosed 3 years ago and takes daily hormonal therapy treatment.  She does report more than a 10 pound weight loss recently but states that this is intentional.  Patient denies any bowel/bladder dysfunction, leg weakness, leg pain, acute arm pain, or gait dysfunction.    Back Pain  This is a new problem. The current episode started 1 to 4 weeks ago. The problem occurs daily. The problem has been gradually worsening since onset. The pain is present in the lumbar spine. The quality of the pain is described as aching. The pain does not radiate. The pain is moderate. The pain is worse during the night. Pertinent negatives include no abdominal pain, bladder incontinence, bowel incontinence, chest pain, fever, leg pain, numbness, paresthesias, pelvic pain, tingling or weakness. Risk factors include history of osteoporosis, history of cancer and obesity.       The following portions of the patient's history were reviewed and updated as appropriate: allergies, current medications, past family history, past medical history, past social history, past surgical history and problem list.    Review of Systems   Constitutional: Negative for chills, fatigue and fever.   HENT: Negative for congestion, postnasal drip, sinus pressure and sinus pain.    Eyes: Negative for photophobia, pain and visual disturbance.   Respiratory: Positive for shortness of breath. Negative for chest tightness and wheezing.    Cardiovascular: Negative for chest pain and palpitations.   Gastrointestinal: Negative for abdominal pain, bowel incontinence, constipation, diarrhea, nausea and vomiting.  "  Genitourinary: Positive for flank pain. Negative for bladder incontinence, difficulty urinating and pelvic pain.   Musculoskeletal: Positive for back pain and neck stiffness. Negative for gait problem and neck pain.   Skin: Negative for rash and wound.   Neurological: Negative for tingling, seizures, speech difficulty, weakness, numbness and paresthesias.   Psychiatric/Behavioral: Negative for behavioral problems, decreased concentration and hallucinations.   All other systems reviewed and are negative.      Objective     .Ht 152.4 cm (60\")   Wt 71.7 kg (158 lb)   BMI 30.86 kg/m²    Body mass index is 30.86 kg/m².      Physical Exam  Neurologic Exam      Assessment/Plan   Independent Review of Radiographic Studies:      I personally reviewed the images from the following studies.    CT cervical spine 1/29/2021    Stable postoperative hardware, ACDF at C3 C6 with no gross signs of hardware failure or loosening.  Patient has residual mild degenerative changes including facet arthropathy which is felt to contribute to mild foraminal and central canal stenosis.     CT lumbar spine 2/2021 outside imaging    Sclerotic lesion noted at inferior posterior portion of L4 body with significant enhancement.    Medical Decision Making:    Patient doing well with no new complaints regarding her neck.  Her hardware is stable.  Concern is for new lesion identified on recent CT of the lumbar spine imaging.  In reviewing of older imaging of lumbar spine in March 2020, there was an area of focal enhancement at the inferior endplate of L4 thought to represent a possible developing Schmorl's node.  Patient will need further imaging work-up on her lumbar spine due to clinical presentation and history of breast cancer.  Once completed she will follow-up with Dr. Pradhan in the office.      Procedures      Diagnoses and all orders for this visit:    1. S/P cervical spinal fusion (Primary)    2. Acute right-sided low back pain without " sciatica    3. Lesion of lumbar spine    4. Hx of breast cancer      No follow-ups on file.    This patient was examined wearing appropriate personal protective equipment.     Patient has never smoked tobacco products.    Patient's blood pressure was reviewed.  Recommendations for  a low-salt diet and exercise to maintain/improve BP in addition to taking any presribed medications.    Patient's BMI is above goal.  Recommendations for initiating a healthy, low-fat diet and exercise to improve BMI, overall health and wellbeing.           Floresita Contreras, APRN  02/26/21  14:25 EST

## 2021-02-26 ENCOUNTER — OFFICE VISIT (OUTPATIENT)
Dept: NEUROSURGERY | Facility: CLINIC | Age: 61
End: 2021-02-26

## 2021-02-26 ENCOUNTER — HOSPITAL ENCOUNTER (OUTPATIENT)
Dept: ONCOLOGY | Facility: HOSPITAL | Age: 61
Setting detail: INFUSION SERIES
Discharge: HOME | End: 2021-02-26

## 2021-02-26 VITALS
WEIGHT: 162 LBS | SYSTOLIC BLOOD PRESSURE: 142 MMHG | HEIGHT: 61 IN | HEART RATE: 90 BPM | BODY MASS INDEX: 30.58 KG/M2 | TEMPERATURE: 97.1 F | DIASTOLIC BLOOD PRESSURE: 80 MMHG | RESPIRATION RATE: 24 BRPM

## 2021-02-26 VITALS — BODY MASS INDEX: 31.02 KG/M2 | HEIGHT: 60 IN | WEIGHT: 158 LBS

## 2021-02-26 DIAGNOSIS — M54.50 ACUTE RIGHT-SIDED LOW BACK PAIN WITHOUT SCIATICA: ICD-10-CM

## 2021-02-26 DIAGNOSIS — M81.0 OSTEOPOROSIS, UNSPECIFIED OSTEOPOROSIS TYPE, UNSPECIFIED PATHOLOGICAL FRACTURE PRESENCE: Primary | ICD-10-CM

## 2021-02-26 DIAGNOSIS — Z98.1 S/P CERVICAL SPINAL FUSION: Primary | ICD-10-CM

## 2021-02-26 DIAGNOSIS — Z85.3 HX OF BREAST CANCER: ICD-10-CM

## 2021-02-26 DIAGNOSIS — M89.9 LESION OF LUMBAR SPINE: ICD-10-CM

## 2021-02-26 LAB
ALBUMIN SERPL-MCNC: 4.2 G/DL (ref 3.5–5.2)
ALBUMIN/GLOB SERPL: 1.2 G/DL
ALP SERPL-CCNC: 112 U/L (ref 39–117)
ALT SERPL W P-5'-P-CCNC: 16 U/L (ref 1–33)
ANION GAP SERPL CALCULATED.3IONS-SCNC: 12 MMOL/L (ref 5–15)
AST SERPL-CCNC: 23 U/L (ref 1–32)
BASOPHILS # BLD AUTO: 0.02 10*3/MM3 (ref 0–0.2)
BASOPHILS NFR BLD AUTO: 0.3 % (ref 0–1.5)
BILIRUB SERPL-MCNC: 0.4 MG/DL (ref 0–1.2)
BUN SERPL-MCNC: 21 MG/DL (ref 6–20)
BUN/CREAT SERPL: 26.9 (ref 7–25)
CALCIUM SPEC-SCNC: 10.4 MG/DL (ref 8.6–10.5)
CHLORIDE SERPL-SCNC: 102 MMOL/L (ref 98–107)
CO2 SERPL-SCNC: 23 MMOL/L (ref 22–29)
CREAT SERPL-MCNC: 0.78 MG/DL (ref 0.57–1)
DEPRECATED RDW RBC AUTO: 42.6 FL (ref 37–54)
EOSINOPHIL # BLD AUTO: 0.09 10*3/MM3 (ref 0–0.4)
EOSINOPHIL NFR BLD AUTO: 1.3 % (ref 0.3–6.2)
ERYTHROCYTE [DISTWIDTH] IN BLOOD BY AUTOMATED COUNT: 13.9 % (ref 12.3–15.4)
GFR SERPL CREATININE-BSD FRML MDRD: 76 ML/MIN/1.73
GLOBULIN UR ELPH-MCNC: 3.5 GM/DL
GLUCOSE SERPL-MCNC: 227 MG/DL (ref 65–99)
HCT VFR BLD AUTO: 43.1 % (ref 34–46.6)
HGB BLD-MCNC: 13.9 G/DL (ref 12–15.9)
LYMPHOCYTES # BLD AUTO: 1.94 10*3/MM3 (ref 0.7–3.1)
LYMPHOCYTES NFR BLD AUTO: 28.2 % (ref 19.6–45.3)
MAGNESIUM SERPL-MCNC: 2 MG/DL (ref 1.6–2.6)
MCH RBC QN AUTO: 27.6 PG (ref 26.6–33)
MCHC RBC AUTO-ENTMCNC: 32.3 G/DL (ref 31.5–35.7)
MCV RBC AUTO: 85.7 FL (ref 79–97)
MONOCYTES # BLD AUTO: 0.53 10*3/MM3 (ref 0.1–0.9)
MONOCYTES NFR BLD AUTO: 7.7 % (ref 5–12)
NEUTROPHILS NFR BLD AUTO: 4.3 10*3/MM3 (ref 1.7–7)
NEUTROPHILS NFR BLD AUTO: 62.5 % (ref 42.7–76)
PHOSPHATE SERPL-MCNC: 3.3 MG/DL (ref 2.5–4.5)
PLATELET # BLD AUTO: 126 10*3/MM3 (ref 140–450)
PMV BLD AUTO: 10 FL (ref 6–12)
POTASSIUM SERPL-SCNC: 4.3 MMOL/L (ref 3.5–5.2)
PROT SERPL-MCNC: 7.7 G/DL (ref 6–8.5)
RBC # BLD AUTO: 5.03 10*6/MM3 (ref 3.77–5.28)
SODIUM SERPL-SCNC: 137 MMOL/L (ref 136–145)
WBC # BLD AUTO: 6.88 10*3/MM3 (ref 3.4–10.8)

## 2021-02-26 PROCEDURE — 99214 OFFICE O/P EST MOD 30 MIN: CPT | Performed by: NURSE PRACTITIONER

## 2021-02-26 PROCEDURE — 84100 ASSAY OF PHOSPHORUS: CPT | Performed by: INTERNAL MEDICINE

## 2021-02-26 PROCEDURE — 80053 COMPREHEN METABOLIC PANEL: CPT | Performed by: INTERNAL MEDICINE

## 2021-02-26 PROCEDURE — 83735 ASSAY OF MAGNESIUM: CPT | Performed by: INTERNAL MEDICINE

## 2021-02-26 PROCEDURE — 85025 COMPLETE CBC W/AUTO DIFF WBC: CPT | Performed by: INTERNAL MEDICINE

## 2021-02-26 PROCEDURE — 96372 THER/PROPH/DIAG INJ SC/IM: CPT

## 2021-02-26 PROCEDURE — 25010000002 DENOSUMAB 60 MG/ML SOLUTION PREFILLED SYRINGE: Performed by: INTERNAL MEDICINE

## 2021-02-26 RX ORDER — HUMAN INSULIN 100 [USP'U]/ML
INJECTION, SUSPENSION SUBCUTANEOUS
COMMUNITY
Start: 2021-02-24 | End: 2021-09-01

## 2021-02-26 RX ADMIN — DENOSUMAB 60 MG: 60 INJECTION SUBCUTANEOUS at 15:00

## 2021-03-03 ENCOUNTER — TELEPHONE (OUTPATIENT)
Dept: FAMILY MEDICINE CLINIC | Facility: CLINIC | Age: 61
End: 2021-03-03

## 2021-03-03 NOTE — TELEPHONE ENCOUNTER
Spoke with patient and explained that Dr Lundy is no longer with our practice.  She was not aware of that.  Scheduled an appt with ELYSE Vasquez on 3/8 at 2:30pm.

## 2021-03-08 ENCOUNTER — LAB (OUTPATIENT)
Dept: FAMILY MEDICINE CLINIC | Facility: CLINIC | Age: 61
End: 2021-03-08

## 2021-03-08 ENCOUNTER — PROCEDURE VISIT (OUTPATIENT)
Dept: FAMILY MEDICINE CLINIC | Facility: CLINIC | Age: 61
End: 2021-03-08

## 2021-03-08 VITALS
RESPIRATION RATE: 12 BRPM | HEART RATE: 102 BPM | TEMPERATURE: 97.5 F | DIASTOLIC BLOOD PRESSURE: 83 MMHG | WEIGHT: 162.8 LBS | HEIGHT: 61 IN | BODY MASS INDEX: 30.73 KG/M2 | SYSTOLIC BLOOD PRESSURE: 144 MMHG

## 2021-03-08 DIAGNOSIS — M89.9 LESION OF LUMBAR SPINE: ICD-10-CM

## 2021-03-08 DIAGNOSIS — E78.2 MIXED HYPERLIPIDEMIA: Primary | ICD-10-CM

## 2021-03-08 DIAGNOSIS — M54.12 CERVICAL RADICULITIS: ICD-10-CM

## 2021-03-08 DIAGNOSIS — I25.10 ATHEROSCLEROSIS OF CORONARY ARTERY OF NATIVE HEART WITHOUT ANGINA PECTORIS, UNSPECIFIED VESSEL OR LESION TYPE: ICD-10-CM

## 2021-03-08 DIAGNOSIS — R07.81 RIB PAIN ON RIGHT SIDE: ICD-10-CM

## 2021-03-08 DIAGNOSIS — R41.3 MEMORY LOSS OF UNKNOWN CAUSE: ICD-10-CM

## 2021-03-08 DIAGNOSIS — I10 ESSENTIAL HYPERTENSION: ICD-10-CM

## 2021-03-08 PROCEDURE — 80061 LIPID PANEL: CPT | Performed by: NURSE PRACTITIONER

## 2021-03-08 PROCEDURE — 99214 OFFICE O/P EST MOD 30 MIN: CPT | Performed by: NURSE PRACTITIONER

## 2021-03-08 NOTE — PROGRESS NOTES
"Chief Complaint  Pain    Subjective          Gladys Garrison presents to [facility]  Hyperlipidemia  This is a chronic problem. The problem is controlled. Current antihyperlipidemic treatment includes statins. Compliance problems include adherence to diet and adherence to exercise.         Memory Loss: Patient c/o increasing falls, dropping items and memory loss for the last year that is progressively worsening.  She also complains of intermittent dizziness but denies headaches, blurred vision slurred speech or other accompanying symptoms.  MRI of brain previously ordered by Dr. Lundy but due to pacemaker status, was requesting approval by cardiology.  Patient reports cardiologist is in [place] and cannot approve imaging.  Dr. Lundy had recommended a referral to cardiology locally.  Patient requesting referral today.    Spinal Lesion: Lesion noted at inferior endplate of L4, according to neurosurgery notes.  Patient referred back to oncology for further work-up.  Patient scheduled for a nuclear medicine bone scan to further evaluate.  Patient followed by Dr. Nunez and Floresita Contreras.    Rib Pain: Patient complaining of right-sided rib pain for the last week.  She reports she leaned over to grab something and heard a \"crunch.\"  Patient has a history of osteoporosis related to cancer treatments.    Chronic Back Pain: Patient complains of chronic neck and low back pain with radiation to bilateral lower extremities.  Previously, patient described pain as shooting.  Dr. Lundy previously referred patient to pain management but patient has not been contacted for appointment.    Objective   Vital Signs:   /83   Pulse 102   Temp 97.5 °F (36.4 °C)   Resp 12   Ht 154.9 cm (61\")   Wt 73.8 kg (162 lb 12.8 oz)   BMI 30.76 kg/m²     Physical Exam  Constitutional:       Appearance: Normal appearance.   HENT:      Head: Normocephalic.   Cardiovascular:      Rate and Rhythm: Normal rate and " regular rhythm.   Pulmonary:      Effort: Pulmonary effort is normal.      Breath sounds: Normal breath sounds.   Chest:       Abdominal:      General: Abdomen is flat. Bowel sounds are normal.      Palpations: Abdomen is soft.   Musculoskeletal:         General: Normal range of motion.      Cervical back: Neck supple.      Right lower leg: No edema.      Left lower leg: No edema.   Skin:     General: Skin is warm and dry.   Neurological:      Mental Status: She is alert and oriented to person, place, and time.      Gait: Gait is intact.   Psychiatric:         Attention and Perception: Attention normal.         Mood and Affect: Mood normal.         Speech: Speech normal.        Result Review :                 Assessment and Plan    Diagnoses and all orders for this visit:    1. Mixed hyperlipidemia (Primary)  Assessment & Plan:  1.  Check lipid panel  2.  Continue Crestor as prescribed      Orders:  -     Lipid Panel    2. Essential hypertension  -     Ambulatory Referral to Cardiology    3. Atherosclerosis of coronary artery of native heart without angina pectoris, unspecified vessel or lesion type  Assessment & Plan:  1.  Refer to cardiology    Orders:  -     Ambulatory Referral to Cardiology    4. Lesion of lumbar spine  Assessment & Plan:  1.  Reviewed neurosurgery notes  2.  Follow-up with oncology and complete imaging as instructed      5. Rib pain on right side  Assessment & Plan:  1.  Pain is stable  2.  Patient denies any dyspnea  3.  Advised that when sneezing, coughing or taking a deep breath, she splint area  4.  Call if pain persists greater than 2 to 3 weeks      6. Cervical radiculitis  Assessment & Plan:  1.  Reviewed Dr. Lundy's previous notes  2.  Referral to pain management was authorized, office to provide patient with phone number so she may call and schedule appointment      7. Memory loss of unknown cause  Assessment & Plan:  1.  MRI of brain previously ordered, will await clearance from  cardiology before proceeding with exam as noted by Dr. Lundy.  Patient reports that oncology may also be scanning, she will let us know      I spent 30 minutes caring for Gladys on this date of service. This time includes time spent by me in the following activities:preparing for the visit, reviewing tests, obtaining and/or reviewing a separately obtained history, performing a medically appropriate examination and/or evaluation , counseling and educating the patient/family/caregiver, ordering medications, tests, or procedures, referring and communicating with other health care professionals , documenting information in the medical record and care coordination  Follow Up   Return in about 3 months (around 6/8/2021).  Patient was given instructions and counseling regarding her condition or for health maintenance advice. Please see specific information pulled into the AVS if appropriate.

## 2021-03-08 NOTE — ASSESSMENT & PLAN NOTE
1.  Pain is stable  2.  Patient denies any dyspnea  3.  Advised that when sneezing, coughing or taking a deep breath, she splint area  4.  Call if pain persists greater than 2 to 3 weeks

## 2021-03-08 NOTE — ASSESSMENT & PLAN NOTE
1.  Reviewed Dr. Lundy's previous notes  2.  Referral to pain management was authorized, office to provide patient with phone number so she may call and schedule appointment

## 2021-03-08 NOTE — ASSESSMENT & PLAN NOTE
1.  MRI of brain previously ordered, will await clearance from cardiology before proceeding with exam as noted by Dr. Lundy.  Patient reports that oncology may also be scanning, she will let us know

## 2021-03-09 LAB
CHOLEST SERPL-MCNC: 219 MG/DL (ref 0–200)
HDLC SERPL-MCNC: 41 MG/DL (ref 40–60)
LDLC SERPL CALC-MCNC: 119 MG/DL (ref 0–100)
LDLC/HDLC SERPL: 2.7 {RATIO}
TRIGL SERPL-MCNC: 336 MG/DL (ref 0–150)
VLDLC SERPL-MCNC: 59 MG/DL (ref 5–40)

## 2021-03-09 RX ORDER — ROSUVASTATIN CALCIUM 20 MG/1
20 TABLET, COATED ORAL NIGHTLY
Qty: 90 TABLET | Refills: 0 | Status: SHIPPED | OUTPATIENT
Start: 2021-03-09

## 2021-03-09 NOTE — PROGRESS NOTES
Please leet patient know her total cholesterol and triglycerides are elevated. I am increasing her Crestor to 20mg nightly. I also want her to start taking Fish Oil. We will repeat fasting lipids in 6 months

## 2021-03-12 ENCOUNTER — HOSPITAL ENCOUNTER (OUTPATIENT)
Dept: NUCLEAR MEDICINE | Facility: HOSPITAL | Age: 61
Discharge: HOME | End: 2021-03-12

## 2021-03-12 DIAGNOSIS — M89.9 LESION OF LUMBAR SPINE: ICD-10-CM

## 2021-03-12 DIAGNOSIS — Z85.3 HX OF BREAST CANCER: ICD-10-CM

## 2021-03-12 PROCEDURE — 0 TECHNETIUM MEDRONATE KIT: Performed by: NURSE PRACTITIONER

## 2021-03-12 PROCEDURE — A9503 TC99M MEDRONATE: HCPCS | Performed by: NURSE PRACTITIONER

## 2021-03-12 PROCEDURE — 78306 BONE IMAGING WHOLE BODY: CPT

## 2021-03-12 RX ORDER — TC 99M MEDRONATE 20 MG/10ML
23 INJECTION, POWDER, LYOPHILIZED, FOR SOLUTION INTRAVENOUS
Status: COMPLETED | OUTPATIENT
Start: 2021-03-12 | End: 2021-03-12

## 2021-03-12 RX ADMIN — TC 99M MEDRONATE 23 MILLICURIE: 20 INJECTION, POWDER, LYOPHILIZED, FOR SOLUTION INTRAVENOUS at 11:04

## 2021-03-15 ENCOUNTER — OFFICE VISIT (OUTPATIENT)
Dept: NEUROSURGERY | Facility: CLINIC | Age: 61
End: 2021-03-15

## 2021-03-15 ENCOUNTER — TELEPHONE (OUTPATIENT)
Dept: ONCOLOGY | Facility: CLINIC | Age: 61
End: 2021-03-15

## 2021-03-15 VITALS
SYSTOLIC BLOOD PRESSURE: 174 MMHG | WEIGHT: 160 LBS | HEIGHT: 61 IN | DIASTOLIC BLOOD PRESSURE: 94 MMHG | HEART RATE: 87 BPM | BODY MASS INDEX: 30.21 KG/M2

## 2021-03-15 DIAGNOSIS — M47.812 CERVICAL SPONDYLOSIS WITHOUT MYELOPATHY: Primary | ICD-10-CM

## 2021-03-15 PROCEDURE — 99213 OFFICE O/P EST LOW 20 MIN: CPT | Performed by: SURGERY

## 2021-03-15 NOTE — PROGRESS NOTES
Subjective   History of Present Illness: Gladys Garrison is a 60 y.o. female seen in follow-up in our neurosurgery clinic in 3/15/2021.  She underwent a C4 vertebrectomy and ACDF C3-C5 with removal of C5-C6 hardware by Dr. Kaba on 9/22/2021.  Postoperative visit by Floresita Contreras on 2/2/2021 confirmed that she was doing well postoperatively from her ACDF.  Postoperative imaging studies disclosed good decompression and arthrodesis without any screw loosening.  Majority of preoperative symptoms have resolved.  Patient also has a history of breast cancer diagnosed 3 years ago and has undergone mastectomy with lymph node resection and hormonal therapy which is just recently been restarted.  Her oncologist is .  With her last evaluation in the neurosurgery office review of older imaging of the lumbar spine in March 2, 2002 0 was revealing for an area of focal enhancement at the inferior endplate of L4 thought thoracic present a possibleSchmorl's note but possibility of metastatic foci was also a consideration.  Subsequently additional imaging studies have been requested including an MRI with contrast of the lumbar spine and bone scan.  MRI with contrast of lumbar spine was not completed, but the bone scan is revealing for subtle activity in the inferior L4 vertebral body and possibility of new lesion at the posterior left sacrum all findings concerning for metastasis.  PET/CT is recommended.    History of Present Illness    The following portions of the patient's history were reviewed and updated as appropriate: allergies, current medications, past family history, past medical history, past social history, past surgical history and problem list.    Review of Systems   Constitutional: Negative.    HENT: Negative.    Respiratory: Negative.    Musculoskeletal: Positive for back pain.   Neurological: Negative.        Objective     ./94 (BP Location: Left arm, Patient Position: Sitting, Cuff Size: Adult)    "Pulse 87   Ht 154.9 cm (61\")   Wt 72.6 kg (160 lb)   BMI 30.23 kg/m²    Body mass index is 30.23 kg/m².      Physical Exam  Constitutional:       Appearance: Normal appearance.   HENT:      Head: Normocephalic.   Eyes:      Pupils: Pupils are equal, round, and reactive to light.   Pulmonary:      Effort: Pulmonary effort is normal.   Neurological:      Mental Status: She is alert and oriented to person, place, and time.   Psychiatric:         Speech: Speech normal.       Neurologic Exam     Mental Status   Oriented to person, place, and time.   Speech: speech is normal     Cranial Nerves     CN III, IV, VI   Pupils are equal, round, and reactive to light.    Motor Exam 5/5 strength throughout with normal bulk and tone.  Negative pronator drift.  Negative Amos sign.     Sensory Exam   Primary and cortical sensory modalities intact.         Assessment/Plan   Independent Review of Radiographic Studies:      I personally reviewed the images from the following studies.  Study Result    Narrative & Impression   DATE OF EXAM:  3/12/2021 11:03 AM     PROCEDURE:  NM BONE SCAN WHOLE BODY-     INDICATIONS:  lumbar bone lesion, hx of breast cancer; M89.9-Disorder of bone,  unspecified; Z85.3-Personal history of malignant neoplasm of breast     COMPARISON:  Outside CT abdomen pelvis February 24, 2021. Chest CT and thoracic spine  radiographs February 4, 2021. CT abdomen pelvis November 29, 2019. CT of  the cervical, thoracic, and lumbar spine July 20, 2020     TECHNIQUE:   The patient received 23 mCi of technetium 99m MDP intravenously and  delayed anterior and posterior whole body bone images were obtained.     FINDINGS:  On the posterior view there is suggestion of subtle asymmetric activity  in the area of the inferior L4 vertebral body which appears to correlate  with the location of the sclerotic lesion noted on recent prior CT. This  again appears to represent a change from prior imaging of the lumbar  spine and " could represent a metastasis.     There is focal activity in the thoracic spine which appears to be in the  area of the T9 vertebrae. This could be related to previously seen  fracture.     There is a focal area of activity seen posteriorly in the area of the  left sacrum. Upon further review of prior imaging there is question of  very subtle interval change with subtle loss of cortex in this location  on the prior CT abdomen and pelvis. This is also concerning for possible  metastasis.     There appear to be foci of activity seen in the posterior right 12th and  a lateral right lower rib, possibly the 10th rib. Patient reported  history of injury one week ago with possible right rib fracture which  could account for these findings.     There appear to be degenerative changes at the shoulders,  sternoclavicular joints, and bilateral feet and ankles. There is  suspected physiologic uptake and excretion from the kidneys.     IMPRESSION:  1.Subtle activity in the inferior L4 vertebral body which appears to  correlate with the new area of sclerosis on recent CT of the abdomen and  pelvis.  There is also subtle activity with possible new lesion at the  posterior left sacrum. These findings are concerning for metastases  given patient's history and further evaluation with PET CT is  recommended.  2.Activity in right posterior and lateral lower ribs which is  nonspecific but could be related to rib fractures as patient reports a  history of recent trauma in this location.  3.Activity in the area of the T9 vertebral body which may related to  chronic fracture as has been seen on prior images in this location.     Electronically Signed By-Stanley Miller MD On:3/12/2021 3:24 PM  This report was finalized on [id number] by  Stanley Miller MD.           Medical Decision Making:      Patient is 60-year-old with breast cancer reportedly stage III is undergone mastectomy lymph node resection and and is being treated with ongoing  immunotherapy.  Suggestive metastatic lesion may be evident in the region of the L4 body perhaps in the sacrum which may necessitate additional treatment.  PET CT scan is recommended with notification to continue follow-up with oncologist Dr. Elizondo.    Procedures      Diagnoses and all orders for this visit:    1. Cervical spondylosis without myelopathy (Primary)      Return if symptoms worsen or fail to improve.    This patient was examined wearing appropriate personal protective equipment.     Patient is a current tobacco user.  Recommendations for cessation/avoidance of tobacco products.    Smoking increases the risk of heart disease, lung disease, vascular disease, stroke, and cancer. If you smoke, STOP!    For more information:  Quit Now [place]  1-800-QUIT-NOW  https://[email or web address]/      Patient's blood pressure was reviewed.  Recommendations for  a low-salt diet and exercise to maintain/improve BP in addition to taking any presribed medications.    Patient's BMI is above goal.  Recommendations for initiating a healthy, low-fat diet and exercise to improve BMI, overall health and wellbeing.             Godwin Pradhan MD  03/15/21  10:42 EDT

## 2021-03-15 NOTE — TELEPHONE ENCOUNTER
Caller: LUCIEN SARGENT    Relationship to patient: SELF    Best call back number: [phone]    Patient is needing: R/S 5- F/U VISIT TO [facility]. PT HAD SCAN DONE AND WAS TOLD TO CALL US TO COME IN [facility].

## 2021-03-16 ENCOUNTER — TELEPHONE (OUTPATIENT)
Dept: NEUROSURGERY | Facility: CLINIC | Age: 61
End: 2021-03-16

## 2021-03-16 ENCOUNTER — TELEPHONE (OUTPATIENT)
Dept: FAMILY MEDICINE CLINIC | Facility: CLINIC | Age: 61
End: 2021-03-16

## 2021-03-16 NOTE — TELEPHONE ENCOUNTER
Called pt to let her know referral on our end is complete and that cardiology will be reaching out to schedule directly with her.

## 2021-03-16 NOTE — TELEPHONE ENCOUNTER
Caller: Gladys Garrison    Relationship: Self    Best call back number: [phone]    What orders are you requesting (i.e. lab or imaging): PET/CT is recommended    In what timeframe would the patient need to come in: ASAP    Where will you receive your lab/imaging services: [facility]    Additional notes: PT CALLED TO SEE IF THE ORDER WAS PLACED FOR THE PET/CT. PLEASE CALL PT ONCE ORDER IS PLACED.    THANK YOU

## 2021-03-16 NOTE — TELEPHONE ENCOUNTER
PATIENT CALLED UPSET THAT HER REFERRAL TO CARDIOLOGY HAS NOT BEEN COMPLETED YET. SHE STATES SHE NEEDS AN MRI AND HAS TO SEE A CARDIOLOGIST FIRST.

## 2021-03-22 ENCOUNTER — TELEPHONE (OUTPATIENT)
Dept: FAMILY MEDICINE CLINIC | Facility: CLINIC | Age: 61
End: 2021-03-22

## 2021-03-22 NOTE — TELEPHONE ENCOUNTER
LUCIEN SAW A NEURO Godwin Campoverde MD ON 3/15 AND WAS ADVISED THAT SHE NEEDS TO GET A PET SCAN AS SOON AS POSSIBLE AND MOVE HER ONCOLOGY APPOINTMENT [facility] AS WELL. PATIENT IS VERY CONCERNED THAT CANCER IS MOVING TO THE BONE BUT CAN NOT GET AHOLD OF DR. CAMPOVERDE.    PATIENT WOULD LIKE TO KNOW IF WE COULD PUT AN ORDER FOR HER TO COMPLETE A PET SCAN.    PLEASE CONTACT LUCIEN -[phone]

## 2021-03-22 NOTE — TELEPHONE ENCOUNTER
I'm routing this message to Dr. Nunez as I am not familiar with what is going on. Sounds like she needs to make an appt with oncology soon.

## 2021-03-23 ENCOUNTER — TELEPHONE (OUTPATIENT)
Dept: ONCOLOGY | Facility: CLINIC | Age: 61
End: 2021-03-23

## 2021-03-23 DIAGNOSIS — M89.9 LESION OF LUMBAR SPINE: ICD-10-CM

## 2021-03-23 DIAGNOSIS — R93.89 ABNORMAL CT SCAN: ICD-10-CM

## 2021-03-23 DIAGNOSIS — Z17.0 MALIGNANT NEOPLASM OF CENTRAL PORTION OF LEFT BREAST IN FEMALE, ESTROGEN RECEPTOR POSITIVE (HCC): ICD-10-CM

## 2021-03-23 DIAGNOSIS — M89.8X9 BONE PAIN: ICD-10-CM

## 2021-03-23 DIAGNOSIS — M53.3 SACRAL LESION: ICD-10-CM

## 2021-03-23 DIAGNOSIS — C50.919 MALIGNANT NEOPLASM OF FEMALE BREAST, UNSPECIFIED ESTROGEN RECEPTOR STATUS, UNSPECIFIED LATERALITY, UNSPECIFIED SITE OF BREAST (HCC): Primary | ICD-10-CM

## 2021-03-23 DIAGNOSIS — C79.51 SPINE METASTASIS: ICD-10-CM

## 2021-03-23 DIAGNOSIS — C50.112 MALIGNANT NEOPLASM OF CENTRAL PORTION OF LEFT BREAST IN FEMALE, ESTROGEN RECEPTOR POSITIVE (HCC): ICD-10-CM

## 2021-03-23 DIAGNOSIS — C79.9 METASTATIC MALIGNANT NEOPLASM, UNSPECIFIED SITE (HCC): ICD-10-CM

## 2021-03-23 NOTE — TELEPHONE ENCOUNTER
Does pt need to get her PET scan before 4/21?  She is having trouble getting this scheduled.  Can Dr. Nunez's office schedule this for her?    [phone]

## 2021-03-23 NOTE — TELEPHONE ENCOUNTER
I called the pt to let her know that the neurosurgeon did not put in the order for the PET scan and that Dr. Nunez just ordered it so she should get a call to schedule it soon. Pt verbalized understanding.

## 2021-04-08 ENCOUNTER — HOSPITAL ENCOUNTER (OUTPATIENT)
Dept: PET IMAGING | Facility: HOSPITAL | Age: 61
Discharge: HOME | End: 2021-04-08
Admitting: INTERNAL MEDICINE

## 2021-04-08 DIAGNOSIS — Z17.0 MALIGNANT NEOPLASM OF CENTRAL PORTION OF LEFT BREAST IN FEMALE, ESTROGEN RECEPTOR POSITIVE (HCC): ICD-10-CM

## 2021-04-08 DIAGNOSIS — M89.8X9 BONE PAIN: ICD-10-CM

## 2021-04-08 DIAGNOSIS — M53.3 SACRAL LESION: ICD-10-CM

## 2021-04-08 DIAGNOSIS — C79.51 SPINE METASTASIS: ICD-10-CM

## 2021-04-08 DIAGNOSIS — C50.112 MALIGNANT NEOPLASM OF CENTRAL PORTION OF LEFT BREAST IN FEMALE, ESTROGEN RECEPTOR POSITIVE (HCC): ICD-10-CM

## 2021-04-08 DIAGNOSIS — M89.9 LESION OF LUMBAR SPINE: ICD-10-CM

## 2021-04-08 DIAGNOSIS — C50.919 MALIGNANT NEOPLASM OF FEMALE BREAST, UNSPECIFIED ESTROGEN RECEPTOR STATUS, UNSPECIFIED LATERALITY, UNSPECIFIED SITE OF BREAST (HCC): ICD-10-CM

## 2021-04-08 DIAGNOSIS — R93.89 ABNORMAL CT SCAN: ICD-10-CM

## 2021-04-08 DIAGNOSIS — C79.9 METASTATIC MALIGNANT NEOPLASM, UNSPECIFIED SITE (HCC): ICD-10-CM

## 2021-04-08 LAB — GLUCOSE BLDC GLUCOMTR-MCNC: 120 MG/DL (ref 70–105)

## 2021-04-08 PROCEDURE — A9552 F18 FDG: HCPCS | Performed by: INTERNAL MEDICINE

## 2021-04-08 PROCEDURE — 78815 PET IMAGE W/CT SKULL-THIGH: CPT

## 2021-04-08 PROCEDURE — 0 FLUDEOXYGLUCOSE F18 SOLUTION: Performed by: INTERNAL MEDICINE

## 2021-04-08 PROCEDURE — 82962 GLUCOSE BLOOD TEST: CPT

## 2021-04-08 RX ADMIN — FLUDEOXYGLUCOSE F18 1 DOSE: 300 INJECTION INTRAVENOUS at 09:16

## 2021-04-09 ENCOUNTER — TELEPHONE (OUTPATIENT)
Dept: FAMILY MEDICINE CLINIC | Facility: CLINIC | Age: 61
End: 2021-04-09

## 2021-04-09 ENCOUNTER — TELEPHONE (OUTPATIENT)
Dept: ONCOLOGY | Facility: CLINIC | Age: 61
End: 2021-04-09

## 2021-04-09 NOTE — TELEPHONE ENCOUNTER
1. FINDINGS consistent with osseous metastatic disease in the left  hemisacrum and within the inferior left L4 vertebral endplate. There are  tiny foci of hypermetabolic activity also noted within the L3 and T11  vertebrae without CT correlate, nonetheless suspicious for early osseous  metastases.  2. No evidence of metastatic disease in the neck, chest, and within the  soft tissues of the abdomen and pelvis.

## 2021-04-09 NOTE — TELEPHONE ENCOUNTER
Caller: Lucien Garrison    Relationship: Self    Best call back number: [phone]    Caller requesting test results: LUCIEN    What test was performed: 4/8/2021    When was the test performed: PET SCAN     Where was the test performed:    Additional notes:  PATIENT ALSO STATED THAT SHE THINKS SHE MAY HAVE HAD A REACTION TO THE MEDICATION SHE RECEIVED FOR SCAN, WOKE UP ALL CHOKED UP & SWOLLEN EYES, MIGHT BE ALLERGIES SHE STATED BUT FELT IMPORTANT TO SHARE.

## 2021-04-09 NOTE — TELEPHONE ENCOUNTER
DELETE AFTER REVIEWING: Telephone encounter to be sent to the clinical pool.    Caller: Gladys Garrison    Relationship: Self    Best call back number: [phone]        What test was performed: PET SCAN    When was the test performed: 4/8/2021    Where was the test performed: [facility]

## 2021-04-09 NOTE — TELEPHONE ENCOUNTER
Contacted patient to let her know that Dr. Nunez was out of town and unable to go over the results at this time and we are unable to disclose results until MD reviews.     Moved patient's appointment up to 04/14 at 1045 due to staff message from [facility] regarding patient wanting to go over results earlier.

## 2021-04-13 ENCOUNTER — TELEPHONE (OUTPATIENT)
Dept: ONCOLOGY | Facility: HOSPITAL | Age: 61
End: 2021-04-13

## 2021-04-13 ENCOUNTER — TELEPHONE (OUTPATIENT)
Dept: ONCOLOGY | Facility: CLINIC | Age: 61
End: 2021-04-13

## 2021-04-13 NOTE — TELEPHONE ENCOUNTER
Caller: LUCIEN SARGENT    Relationship to patient: SELF    Best call back number: [phone]    PT IS CALLING TO SEE IF SHE CAN GET THE RESULTS OF HER PET SCAN FROM 04/08/21, SHE IS SCHEDULED TO COME IN TO SEE DR LYLE TOMORROW 04/14, PT STATES SHE IS STILL COMING IN TO HER APPT, BUT SHE DOES NOT WANT TO WAIT UNTIL HER APPT TO GET HER PET SCAN RESULTS. PT STATES HER SON AND GRANDCHILDREN ARE BRINGING HER TO AND FROM THE APPT, AND SHE WANTS TO KNOW BEFORE HER APPT IF THE RESULTS ARE BAD OR NOT AS SHE DOES NOT WANT TO BE UPSET IN FRONT OF HER GRANDCHILDREN

## 2021-04-13 NOTE — TELEPHONE ENCOUNTER
Case Management/ Note    Patient Name: Gladys Garrison  YOB: 1960  MRN #: [id number]    OSW called patient to ascertain what was needed for FMLA form. She said it was for her son's work as her medical appointment schedule is such that her son and  are both needed to help get her to appointments. She adds that neither want her to drive to the drops in blood sugar that she has at times. She is requesting the paperwork be left for her to  at the  when complete.     She said she recently had a PET scan and is requesting to speak to someone about this. She was transferred to triage nursing.     4/15/21: FMLA paperwork complete and given to  for patient to . Paperwork copied and sent to medical records for scanning.     Electronically signed by:   Kita Falcon LCSW, OSW-C  04/13/21, 09:19 EDT

## 2021-04-13 NOTE — PROGRESS NOTES
Hematology/Oncology Outpatient Follow Up    PATIENT NAME:Gladys Garrison  :1960  MRN: [id number]  PRIMARY CARE PHYSICIAN: Kourtney Coffman APRN  REFERRING PHYSICIAN: Kourtney Coffman APRN    Chief Complaint   Patient presents with   • Follow-up     Malignant neoplasm of female breast        HISTORY OF PRESENT ILLNESS:     This is a 60-year-old female who multiple comorbidities including congenital abnormalities such as hypoplastic kidney, tetralogy of Fallot, coarctation of the aorta and congenital VSD status post repair.  Patient developed syncopal episode and for that reason she had she had a CT scan of the chest which showed mass in the left breast.  She had diagnostic mammogram and ultrasound which showed a 2 cm spiculated mass in the left breast at the 1 o'clock position 6 cm from the nipple.  Biopsy of the left breast mass revealed invasive moderately differentiated carcinoma ER/CA positive and HER-2/bishop negative.  Patient also had an ultrasound of the axilla which was concerning for an abnormal left axillary lymph node with cortical thickening.  She apparently had an FNA of the left axillary nodule which was positive for malignancy.  On 2017 patient underwent left modified radical mastectomy, right prophylactic mastectomy and immediate breast reconstruction.  She also underwent right prophylactic mastectomy.  We have had her on records suggest that patient did have multifocal disease with pT2 pN1 aM0.  The largest focus measured 3.5 cm.  2 of 11 lymph nodes were positive for metastatic disease some with extracapsular extension.  Notes that patient did receive Arimidex neoadjuvant  from 2017 to 2018 prior to her bilateral mastectomies.    Review of her note suggest that she developed cough which she attributed to anastrozole and patient was then placed on tamoxifen.  She had MammaPrint testing which returned with low risk for relapse.    Her postop course was also complicated by  left chest wall abscess which resulted in I&D and removal of the left tissue expander. She was referred for radiation treatment boards ultimately declined.    Patient was then placed on tamoxifen in April 2018 which she stopped after less than a month due to nausea and vomiting.  Patient in the interim also was diagnosed with chronic hepatitis C was seen by the hepatologist.  Tamoxifen was dose reduced to 10 mg daily with the goal to increase to 20 mg daily.      Patient has relocated to [facility] [place].  She has transitioned her care to us now.  She is currently not on any antiestrogen therapy.     Her bilateral mastectomy specimen are available for review    · 1/29/2021 patient had bone density which showed osteoporosis  · Patient was seen by neurosurgery and had a bone scan done in March 2021 which showed subtle activity in the inferior L4 vertebral body appears to correlate with new area of sclerosis on CT of the abdomen and pelvis.  There is also subtle activity with possible new lesion at the posterior left sacrum.  These findings were concerning for metastatic disease.  PET CT scan was recommended to further evaluate.  · 4/8/2021 patient had a PET CT scan which showed evidence of disease in the neck chest abdomen and pelvis.  But she has a 1.1 cm mixed lytic/sclerotic hypermetabolic lesion within the left hemisacrum consistent with metastatic disease.  There is also accumulation within the inferior endplate of the L4 vertebral body thought to correspond to 1.2 centimeters sclerotic lesion consistent with metastatic disease.  There is a tiny hypermetabolic focus within the L3, T11.  Suspicious for also early metastatic disease.      Past Medical History:   Diagnosis Date   • Allergic    • Breast cancer (CMS/HCC) 2017    mets to lymph nodes; did not do radiation   • Cancer of unknown origin (CMS/HCC)    • Compression fx, thoracic spine, open, initial encounter (CMS/HCC)    • Coronary artery disease    •  Diabetes mellitus (CMS/HCC)    • Heart disease, unspecified    • Hepatitis C    • Hypertension    • Obesity (BMI 30-39.9) 2021   • Sleep apnea     no machine   • Type 2 diabetes mellitus (CMS/HCC) 2017       Past Surgical History:   Procedure Laterality Date   • BACK SURGERY      neck   • BREAST RECONSTRUCTION Bilateral    • CARDIAC ABLATION       atrial tachycardia x 5 ablations    • CARDIAC CATHETERIZATION     • CARDIAC SURGERY      stent placed in aorta   • CARDIAC SURGERY      6 surgeries as baby    • CERVICAL FUSION ANTERIOR WITH ARTIFICIAL DISCECTOMY IMPLANTATION N/A 2020    Procedure: C4 VERTEBRECTOMY AND ANTERIOR CERIVCAL DISCECTOMY WITH FUSION OF CERVICAL THREE THROUGH FIVE WITH REMOVAL OF HARDWARE C5-C6;  Surgeon: Mark Kaba MD;  Location: [facility] MAIN OR;  Service: Neurosurgery;  Laterality: N/A;   •  SECTION      x2   • COLONOSCOPY N/A 10/23/2020    Procedure: COLONOSCOPY WITH POLYPECTOMY X6;  Surgeon: Stanley Bourne MD;  Location: [facility] ENDOSCOPY;  Service: Gastroenterology;  Laterality: N/A;  POLYPS, INTERNAL HEMORRHOIDS   • ENDOSCOPY N/A 10/23/2020    Procedure: ESOPHAGOGASTRODUODENOSCOPY WITH BIOPSY X 1 AREA;  Surgeon: Stanley Bourne MD;  Location: [facility] ENDOSCOPY;  Service: Gastroenterology;  Laterality: N/A;  GASTRITIS, ESOPHAGITIS, HIATAL HERNIA   • KNEE SURGERY     • MASTECTOMY Bilateral    • NECK SURGERY     • PACEMAKER IMPLANTATION           Current Outpatient Medications:   •  Accu-Chek FastClix Lancets misc, For finger stick 4x/d, Disp: 100 each, Rfl: 12  •  Alcohol Swabs 70 % pads, Use tid, Disp: 300 each, Rfl: 2  •  anastrozole (Arimidex) 1 MG tablet, Take 1 tablet by mouth Daily., Disp: 30 tablet, Rfl: 6  •  aspirin 81 MG chewable tablet, Chew 1 tablet Daily. (Patient taking differently: Chew 81 mg Daily. Hold DOS), Disp: 30 tablet, Rfl: 1  •  Blood Glucose Monitoring Suppl (ACCU-CHEK TEODORO PLUS) w/Device kit, Use as directed   DX   "E11.9, Disp: 1 kit, Rfl: 0  •  Calcium Carbonate-Vit D-Min (Calcium 600+D Plus Minerals) 600-400 MG-UNIT chewable tablet, Chew 600 mg 2 (Two) Times a Day., Disp: 60 each, Rfl: 6  •  Continuous Blood Gluc Sensor (FreeStyle Rajeev 14 Day Sensor) misc, 1 each Every 14 (Fourteen) Days., Disp: 6 each, Rfl: 1  •  cyclobenzaprine (FLEXERIL) 10 MG tablet, Take 1 tablet by mouth 3 (Three) Times a Day As Needed for Muscle Spasms., Disp: 90 tablet, Rfl: 0  •  glucose blood (Accu-Chek Araceli Plus) test strip, To check blood sugar 4x/d, Disp: 150 each, Rfl: 12  •  insulin NPH-insulin regular (humuLIN 70/30,novoLIN 70/30) (70-30) 100 UNIT/ML injection, Inject 40 Units under the skin into the appropriate area as directed Every Morning., Disp: , Rfl:   •  insulin NPH-insulin regular (humuLIN 70/30,novoLIN 70/30) (70-30) 100 UNIT/ML injection, Inject 30 Units under the skin into the appropriate area as directed Every Evening., Disp: , Rfl:   •  insulin NPH-insulin regular (NovoLIN 70/30) (70-30) 100 UNIT/ML injection, Inject  under the skin into the appropriate area as directed., Disp: , Rfl:   •  Insulin Syringe 31G X 5/16\" 1 ML misc, 1 syringe 2 (Two) Times a Day., Disp: 100 each, Rfl: 1  •  lisinopril (PRINIVIL,ZESTRIL) 20 MG tablet, Take 20 mg by mouth Daily., Disp: , Rfl:   •  rosuvastatin (Crestor) 20 MG tablet, Take 1 tablet by mouth Every Night., Disp: 90 tablet, Rfl: 0  •  traMADol HCl (ULTRAM) 100 MG tablet, Take 1 tablet by mouth Every Night., Disp: 30 tablet, Rfl: 0    Allergies   Allergen Reactions   • Promethazine Mental Status Change   • Tape Other (See Comments)     .blisters         Family History   Problem Relation Age of Onset   • Lymphoma Mother    • Heart disease Mother    • Stroke Mother    • Cancer Mother    • Other Father    • Diabetes Sister    • Thyroid disease Sister    • No Known Problems Brother    • No Known Problems Brother    • Diabetes type I Half-Sister    • Thyroid cancer Half-Sister    • Cancer " "Maternal Aunt        Cancer-related family history includes Cancer in her maternal aunt and mother; Lymphoma in her mother; Thyroid cancer in her half-sister.    Social History     Tobacco Use   • Smoking status: Never Smoker   • Smokeless tobacco: Never Used   Vaping Use   • Vaping Use: Never used   Substance Use Topics   • Alcohol use: Yes     Alcohol/week: 1.0 standard drinks     Types: 1 Glasses of wine per week     Comment: rare   • Drug use: Not Currently       HPI, ROS and PFSH have been reviewed and confirmed on 4/14/2021.     SUBJECTIVE:      Patient is here today to review her imaging studies        REVIEW OF SYSTEMS:  Review of Systems   Constitutional: Negative for chills and fever.   HENT: Negative for ear pain, mouth sores, nosebleeds and sore throat.    Eyes: Negative for photophobia and visual disturbance.   Respiratory: Negative for wheezing and stridor.    Cardiovascular: Negative for chest pain and palpitations.   Gastrointestinal: Negative for abdominal pain, diarrhea, nausea and vomiting.   Endocrine: Negative for cold intolerance and heat intolerance.   Genitourinary: Negative for dysuria and hematuria.   Musculoskeletal: Positive for arthralgias. Negative for joint swelling and neck stiffness.        Neck pain   Skin: Negative for color change and rash.   Neurological: Negative for seizures and syncope.   Hematological: Negative for adenopathy.   Psychiatric/Behavioral: Negative for agitation, confusion and hallucinations.     OBJECTIVE:    Vitals:    04/14/21 1122   BP: (!) 169/107  Comment: pt anxious   Pulse: 82   Resp: 20   Temp: 98 °F (36.7 °C)   Weight: 73 kg (161 lb)   Height: 154.9 cm (61\")   PainSc:   7   PainLoc: Comment: back, kidney area, ears     Body mass index is 30.42 kg/m².    ECOG  (1) Restricted in physically strenuous activity, ambulatory and able to do work of light nature    Physical Exam  Vitals and nursing note reviewed.   Constitutional:       General: She is not in " acute distress.     Appearance: She is not diaphoretic.   HENT:      Head: Normocephalic and atraumatic.   Eyes:      General: No scleral icterus.        Right eye: No discharge.         Left eye: No discharge.      Conjunctiva/sclera: Conjunctivae normal.   Neck:      Thyroid: No thyromegaly.   Cardiovascular:      Rate and Rhythm: Normal rate and regular rhythm.      Heart sounds: Normal heart sounds. No friction rub. No gallop.    Pulmonary:      Effort: Pulmonary effort is normal. No respiratory distress.      Breath sounds: No stridor. No wheezing.   Abdominal:      General: Bowel sounds are normal.      Palpations: Abdomen is soft. There is no mass.      Tenderness: There is no abdominal tenderness. There is no guarding or rebound.   Musculoskeletal:         General: No tenderness. Normal range of motion.      Cervical back: Normal range of motion and neck supple.      Comments: Neck pain.  Patient has a neck collar.   Lymphadenopathy:      Cervical: No cervical adenopathy.   Skin:     General: Skin is warm.      Findings: No erythema or rash.   Neurological:      Mental Status: She is alert and oriented to person, place, and time.      Motor: No abnormal muscle tone.   Psychiatric:         Behavior: Behavior normal.     I have reexamined the patient and the results are consistent with the previously documented exam. Мария Nunez MD     RECENT LABS    WBC   Date Value Ref Range Status   04/14/2021 6.02 3.40 - 10.80 10*3/mm3 Final     RBC   Date Value Ref Range Status   04/14/2021 4.68 3.77 - 5.28 10*6/mm3 Final     Hemoglobin   Date Value Ref Range Status   04/14/2021 13.1 12.0 - 15.9 g/dL Final     Hematocrit   Date Value Ref Range Status   04/14/2021 41.0 34.0 - 46.6 % Final     MCV   Date Value Ref Range Status   04/14/2021 87.6 79.0 - 97.0 fL Final     MCH   Date Value Ref Range Status   04/14/2021 28.0 26.6 - 33.0 pg Final     MCHC   Date Value Ref Range Status   04/14/2021 32.0 31.5 - 35.7 g/dL  Final     RDW   Date Value Ref Range Status   04/14/2021 13.8 12.3 - 15.4 % Final     RDW-SD   Date Value Ref Range Status   04/14/2021 43.5 37.0 - 54.0 fl Final     MPV   Date Value Ref Range Status   04/14/2021 10.5 6.0 - 12.0 fL Final     Platelets   Date Value Ref Range Status   04/14/2021 93 (L) 140 - 450 10*3/mm3 Final     Neutrophil %   Date Value Ref Range Status   04/14/2021 63.3 42.7 - 76.0 % Final     Lymphocyte %   Date Value Ref Range Status   04/14/2021 27.4 19.6 - 45.3 % Final     Monocyte %   Date Value Ref Range Status   04/14/2021 8.0 5.0 - 12.0 % Final     Eosinophil %   Date Value Ref Range Status   04/14/2021 1.0 0.3 - 6.2 % Final     Basophil %   Date Value Ref Range Status   04/14/2021 0.3 0.0 - 1.5 % Final     Immature Grans %   Date Value Ref Range Status   07/20/2020 0.7 (H) 0.0 - 0.5 % Final     Neutrophils, Absolute   Date Value Ref Range Status   04/14/2021 3.81 1.70 - 7.00 10*3/mm3 Final     Lymphocytes, Absolute   Date Value Ref Range Status   04/14/2021 1.65 0.70 - 3.10 10*3/mm3 Final     Monocytes, Absolute   Date Value Ref Range Status   04/14/2021 0.48 0.10 - 0.90 10*3/mm3 Final     Eosinophils, Absolute   Date Value Ref Range Status   04/14/2021 0.06 0.00 - 0.40 10*3/mm3 Final     Basophils, Absolute   Date Value Ref Range Status   04/14/2021 0.02 0.00 - 0.20 10*3/mm3 Final     Immature Grans, Absolute   Date Value Ref Range Status   07/20/2020 0.05 0.00 - 0.05 10*3/mm3 Final     nRBC   Date Value Ref Range Status   02/05/2021 0.1 0.0 - 0.2 /100 WBC Final       Lab Results   Component Value Date    GLUCOSE 227 (H) 02/26/2021    BUN 21 (H) 02/26/2021    CREATININE 0.78 02/26/2021    EGFRIFNONA 76 02/26/2021    EGFRIFAFRI >60 10/12/2018    BCR 26.9 (H) 02/26/2021    K 4.3 02/26/2021    CO2 23.0 02/26/2021    CALCIUM 10.4 02/26/2021    PROTENTOTREF 7.4 12/14/2020    ALBUMIN 4.20 02/26/2021    LABIL2 0.9 12/14/2020    AST 23 02/26/2021    ALT 16 02/26/2021         Assessment/Plan      Malignant neoplasm of female breast, unspecified estrogen receptor status, unspecified laterality, unspecified site of breast (CMS/HCC)  - CBC & Differential  - CT Needle Biopsy Bone Deep  - Tissue Pathology Exam  - Comprehensive Metabolic Panel  - CEA  - Cancer Antigen 15-3  - Cancer Antigen 27.29  - traMADol HCl (ULTRAM) 100 MG tablet    Spine metastasis (CMS/HCC)  - CBC & Differential  - CT Needle Biopsy Bone Deep  - Tissue Pathology Exam  - Comprehensive Metabolic Panel  - CEA  - Cancer Antigen 15-3  - Cancer Antigen 27.29  - traMADol HCl (ULTRAM) 100 MG tablet    Lesion of lumbar spine  - CBC & Differential  - CT Needle Biopsy Bone Deep  - Tissue Pathology Exam  - Comprehensive Metabolic Panel  - CEA  - Cancer Antigen 15-3  - Cancer Antigen 27.29  - traMADol HCl (ULTRAM) 100 MG tablet    Abnormal CT scan  - CBC & Differential  - CT Needle Biopsy Bone Deep  - Tissue Pathology Exam  - Comprehensive Metabolic Panel  - CEA  - Cancer Antigen 15-3  - Cancer Antigen 27.29      ASSESSMENT:    · 1.1 cm mixed lytic/sclerotic hypermetabolic lesion in the left hemisacrum suspicious for metastatic disease 1.2 cm sclerotic lesion on L4, small L3 lesion and T11 lesion.  · ypT2 N1 aM0 status post left modified radical mastectomy with lymph node dissection and right prophylactic mastectomy in 2017 performed at [facility].  ER positive, OK positive and HER-2/bishop negative.  Status post bilateral chest wall reconstruction.  According to patient, she tolerated Arimidex very well except that she also had osteoporosis therefore Arimidex was discontinued.  · Intolerance of tamoxifen in the past  · Osteoporosis: On Prolia  · Status post neoadjuvant Arimidex prior to bilateral mastectomies  · Thrombocytopenia: Work-up was - December 2020  · Neck pain status post cervical spine surgery: Patient has a soft neck collar  · Complex cardiac medical history including tetralogy of Fallot, coarctation of aorta, VSD  status post repair.  Status post stent placement for coarctation of aorta  · Personal history and strong family history of breast cancer in multiple in the relatives on paternal side of the family including 4 paternal aunts in their 30s and 40s and 2 maternal aunts at age of 20s and 50s.  There is concern for possible hereditary breast cancer syndrome.  Patient may be  interested in pursuing cancer genetics for her management.        Discussion     Reviewed her medical history, and clinical labs.  Patient will benefit from aromatase inhibitor/antiestrogen therapy for her stage II breast cancer.  Chemotherapy was not offered due to low Oncotype DX assay.  She was placed on Arimidex and also Prolia for osteoporosis.  For sister rating her treatments well    Reviewed her latest PET CT scan and also the consensus seen on her spinal and I iliac bones.  She would need to have image guided biopsy to confirm the suspicion for metastatic disease.           Plans:     · She needs tissue biopsy of the suspicious area to confirm malignancy.  We will schedule CT-guided biopsy of the left hemisacrum lesion.  · CMP, CBC, CEA, CA 15-3, CA 27.29.  · Begin Ultram 100 mg p.o. every 12 as needed for pain  · Follow-up in 3 weeks but she will continue Arimidex for now  · Reviewed work-up for thrombocytopenia which was negative  · Reviewed her bone density which showed osteoporosis  · Continue Prolia 60 mg subcu every 6 months  · Continue Arimidex 1 mg p.o. daily  · Continue Os-Chetan D 600 mg tablets twice a day  · Thrombocytopenia work-up reviewed with patient and negative  · She cannot tolerate tamoxifen.  Explained to patient that we can still treat her with Arimidex    · All questions answered           I have reviewed labs results, imaging, vitals, and medications with the patient today.  Will follow up in 3 weeks with me.        Patient verbalized understanding and is in agreement of the above plan.               I spent 40 total  minutes, face-to-face, caring for Gladys today.  90% of this time involved counseling and/or coordination of care as documented within this note.

## 2021-04-14 ENCOUNTER — LAB (OUTPATIENT)
Dept: LAB | Facility: HOSPITAL | Age: 61
End: 2021-04-14

## 2021-04-14 ENCOUNTER — OFFICE VISIT (OUTPATIENT)
Dept: ONCOLOGY | Facility: CLINIC | Age: 61
End: 2021-04-14

## 2021-04-14 VITALS
BODY MASS INDEX: 30.4 KG/M2 | HEIGHT: 61 IN | HEART RATE: 82 BPM | RESPIRATION RATE: 20 BRPM | SYSTOLIC BLOOD PRESSURE: 169 MMHG | WEIGHT: 161 LBS | TEMPERATURE: 98 F | DIASTOLIC BLOOD PRESSURE: 107 MMHG

## 2021-04-14 DIAGNOSIS — M89.9 LESION OF LUMBAR SPINE: ICD-10-CM

## 2021-04-14 DIAGNOSIS — R93.89 ABNORMAL CT SCAN: ICD-10-CM

## 2021-04-14 DIAGNOSIS — C79.51 SPINE METASTASIS: ICD-10-CM

## 2021-04-14 DIAGNOSIS — C50.919 MALIGNANT NEOPLASM OF FEMALE BREAST, UNSPECIFIED ESTROGEN RECEPTOR STATUS, UNSPECIFIED LATERALITY, UNSPECIFIED SITE OF BREAST (HCC): Primary | ICD-10-CM

## 2021-04-14 DIAGNOSIS — C50.919 MALIGNANT NEOPLASM OF FEMALE BREAST, UNSPECIFIED ESTROGEN RECEPTOR STATUS, UNSPECIFIED LATERALITY, UNSPECIFIED SITE OF BREAST (HCC): ICD-10-CM

## 2021-04-14 PROBLEM — N20.0 CALCULUS OF KIDNEY: Status: ACTIVE | Noted: 2021-04-14

## 2021-04-14 PROBLEM — E11.9 DIABETES MELLITUS: Status: ACTIVE | Noted: 2021-04-14

## 2021-04-14 LAB
ALBUMIN SERPL-MCNC: 4.7 G/DL (ref 3.5–5.2)
ALBUMIN/GLOB SERPL: 1.4 G/DL
ALP SERPL-CCNC: 81 U/L (ref 39–117)
ALT SERPL W P-5'-P-CCNC: 23 U/L (ref 1–33)
ANION GAP SERPL CALCULATED.3IONS-SCNC: 10.5 MMOL/L (ref 5–15)
AST SERPL-CCNC: 21 U/L (ref 1–32)
BASOPHILS # BLD AUTO: 0.02 10*3/MM3 (ref 0–0.2)
BASOPHILS NFR BLD AUTO: 0.3 % (ref 0–1.5)
BILIRUB SERPL-MCNC: 0.4 MG/DL (ref 0–1.2)
BUN SERPL-MCNC: 13 MG/DL (ref 6–20)
BUN/CREAT SERPL: 17.6 (ref 7–25)
CALCIUM SPEC-SCNC: 9.7 MG/DL (ref 8.6–10.5)
CANCER AG15-3 SERPL-ACNC: 84 U/ML
CHLORIDE SERPL-SCNC: 106 MMOL/L (ref 98–107)
CO2 SERPL-SCNC: 24.5 MMOL/L (ref 22–29)
CREAT SERPL-MCNC: 0.74 MG/DL (ref 0.57–1)
DEPRECATED RDW RBC AUTO: 43.5 FL (ref 37–54)
EOSINOPHIL # BLD AUTO: 0.06 10*3/MM3 (ref 0–0.4)
EOSINOPHIL NFR BLD AUTO: 1 % (ref 0.3–6.2)
ERYTHROCYTE [DISTWIDTH] IN BLOOD BY AUTOMATED COUNT: 13.8 % (ref 12.3–15.4)
GFR SERPL CREATININE-BSD FRML MDRD: 81 ML/MIN/1.73
GLOBULIN UR ELPH-MCNC: 3.3 GM/DL
GLUCOSE SERPL-MCNC: 50 MG/DL (ref 65–99)
HCT VFR BLD AUTO: 41 % (ref 34–46.6)
HGB BLD-MCNC: 13.1 G/DL (ref 12–15.9)
LYMPHOCYTES # BLD AUTO: 1.65 10*3/MM3 (ref 0.7–3.1)
LYMPHOCYTES NFR BLD AUTO: 27.4 % (ref 19.6–45.3)
MCH RBC QN AUTO: 28 PG (ref 26.6–33)
MCHC RBC AUTO-ENTMCNC: 32 G/DL (ref 31.5–35.7)
MCV RBC AUTO: 87.6 FL (ref 79–97)
MONOCYTES # BLD AUTO: 0.48 10*3/MM3 (ref 0.1–0.9)
MONOCYTES NFR BLD AUTO: 8 % (ref 5–12)
NEUTROPHILS NFR BLD AUTO: 3.81 10*3/MM3 (ref 1.7–7)
NEUTROPHILS NFR BLD AUTO: 63.3 % (ref 42.7–76)
PLATELET # BLD AUTO: 93 10*3/MM3 (ref 140–450)
PMV BLD AUTO: 10.5 FL (ref 6–12)
POTASSIUM SERPL-SCNC: 4.1 MMOL/L (ref 3.5–5.2)
PROT SERPL-MCNC: 8 G/DL (ref 6–8.5)
RBC # BLD AUTO: 4.68 10*6/MM3 (ref 3.77–5.28)
SODIUM SERPL-SCNC: 141 MMOL/L (ref 136–145)
WBC # BLD AUTO: 6.02 10*3/MM3 (ref 3.4–10.8)

## 2021-04-14 PROCEDURE — 86300 IMMUNOASSAY TUMOR CA 15-3: CPT | Performed by: INTERNAL MEDICINE

## 2021-04-14 PROCEDURE — 99215 OFFICE O/P EST HI 40 MIN: CPT | Performed by: INTERNAL MEDICINE

## 2021-04-14 PROCEDURE — 80053 COMPREHEN METABOLIC PANEL: CPT | Performed by: INTERNAL MEDICINE

## 2021-04-14 PROCEDURE — 36415 COLL VENOUS BLD VENIPUNCTURE: CPT | Performed by: INTERNAL MEDICINE

## 2021-04-14 PROCEDURE — 85025 COMPLETE CBC W/AUTO DIFF WBC: CPT

## 2021-04-14 RX ORDER — TRAMADOL HYDROCHLORIDE 100 MG/1
100 TABLET, COATED ORAL NIGHTLY
Qty: 30 TABLET | Refills: 0 | Status: SHIPPED | OUTPATIENT
Start: 2021-04-14 | End: 2021-05-07

## 2021-04-15 LAB
CANCER AG27-29 SERPL-ACNC: 151.7 U/ML (ref 0–38.6)
CEA SERPL-MCNC: 9.9 NG/ML (ref 0–4.7)

## 2021-04-16 ENCOUNTER — TELEPHONE (OUTPATIENT)
Dept: ONCOLOGY | Facility: CLINIC | Age: 61
End: 2021-04-16

## 2021-04-19 ENCOUNTER — TELEPHONE (OUTPATIENT)
Dept: ONCOLOGY | Facility: CLINIC | Age: 61
End: 2021-04-19

## 2021-04-19 NOTE — TELEPHONE ENCOUNTER
Called pt regarding release to go back to work. I asked her what doctor told her she could not work since Dr. Nunez never had any restrictions. She said it was the neurosurgeon. I told her that he should be the one to release her, but she stated that she does not see that physician anymore. I told her I would see what we could do. She verbalized understanding.

## 2021-04-20 ENCOUNTER — TELEPHONE (OUTPATIENT)
Dept: ONCOLOGY | Facility: CLINIC | Age: 61
End: 2021-04-20

## 2021-04-21 ENCOUNTER — APPOINTMENT (OUTPATIENT)
Dept: LAB | Facility: HOSPITAL | Age: 61
End: 2021-04-21

## 2021-04-26 ENCOUNTER — HOSPITAL ENCOUNTER (OUTPATIENT)
Dept: CT IMAGING | Facility: HOSPITAL | Age: 61
Discharge: HOME | End: 2021-04-26
Admitting: RADIOLOGY

## 2021-04-26 VITALS
RESPIRATION RATE: 14 BRPM | DIASTOLIC BLOOD PRESSURE: 63 MMHG | OXYGEN SATURATION: 96 % | HEART RATE: 70 BPM | BODY MASS INDEX: 30.4 KG/M2 | HEIGHT: 61 IN | SYSTOLIC BLOOD PRESSURE: 129 MMHG | TEMPERATURE: 98.6 F | WEIGHT: 161 LBS

## 2021-04-26 DIAGNOSIS — C50.919 MALIGNANT NEOPLASM OF FEMALE BREAST, UNSPECIFIED ESTROGEN RECEPTOR STATUS, UNSPECIFIED LATERALITY, UNSPECIFIED SITE OF BREAST (HCC): ICD-10-CM

## 2021-04-26 DIAGNOSIS — M89.9 LESION OF LUMBAR SPINE: ICD-10-CM

## 2021-04-26 DIAGNOSIS — C79.51 SPINE METASTASIS: ICD-10-CM

## 2021-04-26 DIAGNOSIS — R93.89 ABNORMAL CT SCAN: ICD-10-CM

## 2021-04-26 LAB
APTT PPP: 28.4 SECONDS (ref 24–31)
BASOPHILS # BLD AUTO: 0 10*3/MM3 (ref 0–0.2)
BASOPHILS NFR BLD AUTO: 0.5 % (ref 0–1.5)
DEPRECATED RDW RBC AUTO: 40.3 FL (ref 37–54)
EOSINOPHIL # BLD AUTO: 0.1 10*3/MM3 (ref 0–0.4)
EOSINOPHIL NFR BLD AUTO: 1.4 % (ref 0.3–6.2)
ERYTHROCYTE [DISTWIDTH] IN BLOOD BY AUTOMATED COUNT: 13.5 % (ref 12.3–15.4)
HCT VFR BLD AUTO: 42.3 % (ref 34–46.6)
HGB BLD-MCNC: 14.4 G/DL (ref 12–15.9)
INR PPP: 1.02 (ref 0.93–1.1)
LYMPHOCYTES # BLD AUTO: 2.1 10*3/MM3 (ref 0.7–3.1)
LYMPHOCYTES NFR BLD AUTO: 28.7 % (ref 19.6–45.3)
MCH RBC QN AUTO: 29 PG (ref 26.6–33)
MCHC RBC AUTO-ENTMCNC: 34.1 G/DL (ref 31.5–35.7)
MCV RBC AUTO: 84.9 FL (ref 79–97)
MONOCYTES # BLD AUTO: 0.5 10*3/MM3 (ref 0.1–0.9)
MONOCYTES NFR BLD AUTO: 7.1 % (ref 5–12)
NEUTROPHILS NFR BLD AUTO: 4.6 10*3/MM3 (ref 1.7–7)
NEUTROPHILS NFR BLD AUTO: 62.3 % (ref 42.7–76)
NRBC BLD AUTO-RTO: 0 /100 WBC (ref 0–0.2)
PLATELET # BLD AUTO: 114 10*3/MM3 (ref 140–450)
PMV BLD AUTO: 8.3 FL (ref 6–12)
PROTHROMBIN TIME: 11.2 SECONDS (ref 9.6–11.7)
RBC # BLD AUTO: 4.98 10*6/MM3 (ref 3.77–5.28)
WBC # BLD AUTO: 7.3 10*3/MM3 (ref 3.4–10.8)

## 2021-04-26 PROCEDURE — 77012 CT SCAN FOR NEEDLE BIOPSY: CPT

## 2021-04-26 PROCEDURE — 99152 MOD SED SAME PHYS/QHP 5/>YRS: CPT

## 2021-04-26 PROCEDURE — 25010000002 ONDANSETRON PER 1 MG: Performed by: RADIOLOGY

## 2021-04-26 PROCEDURE — 88341 IMHCHEM/IMCYTCHM EA ADD ANTB: CPT | Performed by: INTERNAL MEDICINE

## 2021-04-26 PROCEDURE — 85730 THROMBOPLASTIN TIME PARTIAL: CPT | Performed by: RADIOLOGY

## 2021-04-26 PROCEDURE — 25010000002 FENTANYL CITRATE (PF) 100 MCG/2ML SOLUTION: Performed by: RADIOLOGY

## 2021-04-26 PROCEDURE — 88307 TISSUE EXAM BY PATHOLOGIST: CPT | Performed by: INTERNAL MEDICINE

## 2021-04-26 PROCEDURE — 25010000003 LIDOCAINE 1 % SOLUTION: Performed by: RADIOLOGY

## 2021-04-26 PROCEDURE — 88311 DECALCIFY TISSUE: CPT | Performed by: INTERNAL MEDICINE

## 2021-04-26 PROCEDURE — 88342 IMHCHEM/IMCYTCHM 1ST ANTB: CPT | Performed by: INTERNAL MEDICINE

## 2021-04-26 PROCEDURE — 85610 PROTHROMBIN TIME: CPT | Performed by: RADIOLOGY

## 2021-04-26 PROCEDURE — 85025 COMPLETE CBC W/AUTO DIFF WBC: CPT | Performed by: RADIOLOGY

## 2021-04-26 PROCEDURE — 25010000002 MIDAZOLAM PER 1 MG: Performed by: RADIOLOGY

## 2021-04-26 RX ORDER — FENTANYL CITRATE 50 UG/ML
INJECTION, SOLUTION INTRAMUSCULAR; INTRAVENOUS
Status: COMPLETED | OUTPATIENT
Start: 2021-04-26 | End: 2021-04-26

## 2021-04-26 RX ORDER — HYDROCODONE BITARTRATE AND ACETAMINOPHEN 5; 325 MG/1; MG/1
2 TABLET ORAL EVERY 6 HOURS PRN
Status: DISCONTINUED | OUTPATIENT
Start: 2021-04-26 | End: 2021-04-27 | Stop reason: HOSPADM

## 2021-04-26 RX ORDER — ONDANSETRON 2 MG/ML
INJECTION INTRAMUSCULAR; INTRAVENOUS
Status: COMPLETED | OUTPATIENT
Start: 2021-04-26 | End: 2021-04-26

## 2021-04-26 RX ORDER — SODIUM CHLORIDE 9 MG/ML
75 INJECTION, SOLUTION INTRAVENOUS CONTINUOUS
Status: DISCONTINUED | OUTPATIENT
Start: 2021-04-26 | End: 2021-04-27 | Stop reason: HOSPADM

## 2021-04-26 RX ORDER — SODIUM CHLORIDE 0.9 % (FLUSH) 0.9 %
3 SYRINGE (ML) INJECTION EVERY 12 HOURS SCHEDULED
Status: DISCONTINUED | OUTPATIENT
Start: 2021-04-26 | End: 2021-04-26 | Stop reason: HOSPADM

## 2021-04-26 RX ORDER — LIDOCAINE HYDROCHLORIDE 10 MG/ML
INJECTION, SOLUTION INFILTRATION; PERINEURAL
Status: COMPLETED | OUTPATIENT
Start: 2021-04-26 | End: 2021-04-26

## 2021-04-26 RX ORDER — SODIUM CHLORIDE 0.9 % (FLUSH) 0.9 %
3-10 SYRINGE (ML) INJECTION AS NEEDED
Status: DISCONTINUED | OUTPATIENT
Start: 2021-04-26 | End: 2021-04-26 | Stop reason: HOSPADM

## 2021-04-26 RX ORDER — MIDAZOLAM HYDROCHLORIDE 1 MG/ML
INJECTION INTRAMUSCULAR; INTRAVENOUS
Status: COMPLETED | OUTPATIENT
Start: 2021-04-26 | End: 2021-04-26

## 2021-04-26 RX ADMIN — ONDANSETRON 4 MG: 2 INJECTION INTRAMUSCULAR; INTRAVENOUS at 09:32

## 2021-04-26 RX ADMIN — MIDAZOLAM 0.5 MG: 1 INJECTION INTRAMUSCULAR; INTRAVENOUS at 09:49

## 2021-04-26 RX ADMIN — FENTANYL CITRATE 50 MCG: 50 INJECTION, SOLUTION INTRAMUSCULAR; INTRAVENOUS at 09:49

## 2021-04-26 RX ADMIN — LIDOCAINE HYDROCHLORIDE 4 ML: 10 INJECTION, SOLUTION INFILTRATION; PERINEURAL at 09:52

## 2021-04-26 RX ADMIN — MIDAZOLAM 1 MG: 1 INJECTION INTRAMUSCULAR; INTRAVENOUS at 09:46

## 2021-04-26 RX ADMIN — FENTANYL CITRATE 100 MCG: 50 INJECTION, SOLUTION INTRAMUSCULAR; INTRAVENOUS at 09:46

## 2021-04-26 RX ADMIN — SODIUM CHLORIDE 75 ML/HR: 0.9 INJECTION, SOLUTION INTRAVENOUS at 08:28

## 2021-04-26 RX ADMIN — Medication 3 ML: at 08:28

## 2021-04-26 RX ADMIN — LIDOCAINE HYDROCHLORIDE 4 ML: 10 INJECTION, SOLUTION INFILTRATION; PERINEURAL at 09:53

## 2021-04-26 NOTE — NURSING NOTE
Needle was removed by Dr. Martin and final CT image taken by Dr. Martin. Local pressure being held at site for hemostasis.

## 2021-04-26 NOTE — NURSING NOTE
Hemostasis achieved and local dressing of bacitracin, 2x2, and tegaderm applied to sacrum biopsy area. Dressing is labeled, dry and intact.

## 2021-04-26 NOTE — DISCHARGE INSTRUCTIONS
A responsible adult should stay with you and you should rest quietly for the rest of the day. Do not drink alcohol, drive or cook for 24 hours following your procedure.  Progress your diet as tolerated.  Resume your usual medications including aspirin.  When you remove your dressing in 48 hours, a small amount of blood is to be expected. Do not be alarmed.  If you feel it is bleeding excessively apply pressure and proceed to the Emergency room.  Do not shower, bath, or get your dressing wet at all for 48 hours.  You may shower after the dressing is removed. No lifting more that 10 pounds for 48 hours.  If severe pain, increased shortness of air or racing heartbeat occur, seek immediate medical attention.  Follow up with Dr. Nunez for results

## 2021-04-26 NOTE — NURSING NOTE
Pt moved self from stretcher onto CT procedure table into prone position, head first into the scanner. Pt placed on 2L oxygen via N/C and remains on cardiac monitor. Pt provided warm blanket for comfort.

## 2021-04-26 NOTE — NURSING NOTE
IR staff moved patient back onto her stretcher. Pt is having sluggish response to verbal stimuli and oxygen was increased to 6L via N/C for oxygen saturation of 85%. Pt's oxygen saturation improved to 96%.

## 2021-04-26 NOTE — NURSING NOTE
Pt is awake, but drowsy and periodically falling asleep. Pt continues to be easily arousable when asleep. Pt is talking appropriately with staff when awakened. Pt denies any pain at this time and dressing to sacrum is dry and intact.

## 2021-04-26 NOTE — POST-PROCEDURE NOTE
IR POST OP NOTE    Procedure:CT guided L sacral lesion biopsy      Pre Op DX:Breast Cancer, bone lesion      Post Op DX:same      Anesthesia: Local, CS      Findings:See dictation      Complications:No immediate.       Provider Signature: Dr. Rocky Martin

## 2021-04-26 NOTE — NURSING NOTE
Pt is snoring while sleeping, but will easily arouse to her name. Pt oxygen saturation is stable at 98% on 3L oxygen via N/C. Pt is laying semi-fowlers on her stretcher.

## 2021-04-26 NOTE — NURSING NOTE
Dr. Martin has guide needle in place and is beginning to obtain sacrum bone lesion biopsy sample and is placing the sample in formulin.

## 2021-04-28 ENCOUNTER — TELEPHONE (OUTPATIENT)
Dept: ONCOLOGY | Facility: CLINIC | Age: 61
End: 2021-04-28

## 2021-04-28 NOTE — TELEPHONE ENCOUNTER
Caller: LUCIEN SARGENT    Relationship: SELF    Best call back number: [phone]    Caller requesting test results: PT    What test was performed: BONE BX    When was the test performed: 04/26/21    Where was the test performed: [facility]    Additional notes: PT IS CALLING FOR THE RESULTS OF HER BX, PT DOES NOT WANT TO WAIT UNTIL HER APPT ON 05/07/21 TO HEAR THE RESULTS. PLEASE CALL BACK WHEN POSSIBLE.

## 2021-04-28 NOTE — TELEPHONE ENCOUNTER
Called patient LVM advising that we do not have results back yet, I let Kim AQUINO RN know the patient would liked to be called that she doesn't want to wait for her appt to hear her results.

## 2021-04-30 ENCOUNTER — TELEPHONE (OUTPATIENT)
Dept: ONCOLOGY | Facility: CLINIC | Age: 61
End: 2021-04-30

## 2021-04-30 NOTE — TELEPHONE ENCOUNTER
I called and spoke with the patient about her biopsy results.      Bone, sacrum, biopsy:    Metastatic ductal carcinoma, see COMMENT      [id number]/[id number]    Electronically signed by Sudhir Stone MD on 4/29/2021 at 1503   Comment    The patient's clinical history of ductal carcinoma of the breast is noted. The bone shows a fibrotic marrow space with a metastatic malignancy. Immunohistochemistry was performed utilizing appropriate controls and the tumor is strongly positive for cytokeratin AE1/3 and estrogen receptor. It displays weak positivity with progesterone receptor and rare positive cells are CK7 positive. The findings are most consistent with a metastasis of the patient's known ductal carcinoma of the breast.     I notified her of her ER and [place] status and what that would mean for treatment.  I also let her know that we are sending the pathology for [id number] pathology.  The patient became tearful because she is worried about leaving her grandson who is the child of her youngest son.  She stated that her son and his wife depend on her a lot of times for finances and their responsibilities.  I notified the patient that there a lot of treatment options available that our goal is treat the breast cancer as a chronic disease.  I let the patient know that Dr. Nunez will have a plan of treatment for when she is seen next week.  She stated that she is bringing her granddaughter with her.  She said that she is ready to fight.

## 2021-04-30 NOTE — TELEPHONE ENCOUNTER
Caller: PT    Relationship: PT    Best call back number: [phone]    Caller requesting test results: YES    What test was performed: BONE BIPSY    When was the test performed: 4/26    Where was the test performed: [facility]    Additional notes:

## 2021-05-03 ENCOUNTER — TELEPHONE (OUTPATIENT)
Dept: ONCOLOGY | Facility: HOSPITAL | Age: 61
End: 2021-05-03

## 2021-05-03 LAB
LAB AP CASE REPORT: NORMAL
LAB AP DIAGNOSIS COMMENT: NORMAL
LAB AP IHC HER2/NEU REPORT,ADDENDUM: NORMAL
PATH REPORT.FINAL DX SPEC: NORMAL
PATH REPORT.GROSS SPEC: NORMAL

## 2021-05-03 NOTE — TELEPHONE ENCOUNTER
Case Management/ Note    Patient Name: Gladys Garrison  YOB: 1960  MRN #: [id number]    OSW called patient at the request of PRANAY Grimaldo. Patient is alert and oriented to person, place and time. She was with someone and her attention was divided. OSW asked if it would be better to call later and she said it would.     Electronically signed by:   Kita Falcon LCSW, OSW-C  05/03/21, 14:08 EDT

## 2021-05-05 NOTE — PROGRESS NOTES
Hematology/Oncology Outpatient Follow Up    PATIENT NAME:Gladys Garrison  :1960  MRN: [id number]  PRIMARY CARE PHYSICIAN: Kourtney Coffman APRN  REFERRING PHYSICIAN: Kourtney Coffman APRN    Chief Complaint   Patient presents with   • Appointment     Malignant neoplasm of female breast, unspecified estrogen receptor status, unspecified laterality, unspecified site of breast (CMS/HCC)   • Follow-up        HISTORY OF PRESENT ILLNESS:     This is a 60-year-old female who multiple comorbidities including congenital abnormalities such as hypoplastic kidney, tetralogy of Fallot, coarctation of the aorta and congenital VSD status post repair.  Patient developed syncopal episode and for that reason she had she had a CT scan of the chest which showed mass in the left breast.  She had diagnostic mammogram and ultrasound which showed a 2 cm spiculated mass in the left breast at the 1 o'clock position 6 cm from the nipple.  Biopsy of the left breast mass revealed invasive moderately differentiated carcinoma ER/IL positive and HER-2/bishop negative.  Patient also had an ultrasound of the axilla which was concerning for an abnormal left axillary lymph node with cortical thickening.  She apparently had an FNA of the left axillary nodule which was positive for malignancy.  On 2017 patient underwent left modified radical mastectomy, right prophylactic mastectomy and immediate breast reconstruction.  She also underwent right prophylactic mastectomy.  We have had her on records suggest that patient did have multifocal disease with pT2 pN1 aM0.  The largest focus measured 3.5 cm.  2 of 11 lymph nodes were positive for metastatic disease some with extracapsular extension.  Notes that patient did receive Arimidex neoadjuvant  from 2017 to 2018 prior to her bilateral mastectomies.    Review of her note suggest that she developed cough which she attributed to anastrozole and patient was then placed on tamoxifen.   She had MammaPrint testing which returned with low risk for relapse.    Her postop course was also complicated by left chest wall abscess which resulted in I&D and removal of the left tissue expander. She was referred for radiation treatment boards ultimately declined.    Patient was then placed on tamoxifen in April 2018 which she stopped after less than a month due to nausea and vomiting.  Patient in the interim also was diagnosed with chronic hepatitis C was seen by the hepatologist.  Tamoxifen was dose reduced to 10 mg daily with the goal to increase to 20 mg daily.      Patient has relocated to [facility].  She has transitioned her care to us now.  She is currently not on any antiestrogen therapy.     Her bilateral mastectomy specimen are available for review    · 1/29/2021 patient had bone density which showed osteoporosis  · Patient was seen by neurosurgery and had a bone scan done in March 2021 which showed subtle activity in the inferior L4 vertebral body appears to correlate with new area of sclerosis on CT of the abdomen and pelvis.  There is also subtle activity with possible new lesion at the posterior left sacrum.  These findings were concerning for metastatic disease.  PET CT scan was recommended to further evaluate.  · 4/8/2021 patient had a PET CT scan which showed evidence of disease in the neck chest abdomen and pelvis.  But she has a 1.1 cm mixed lytic/sclerotic hypermetabolic lesion within the left hemisacrum consistent with metastatic disease.  There is also accumulation within the inferior endplate of the L4 vertebral body thought to correspond to 1.2 centimeters sclerotic lesion consistent with metastatic disease.  There is a tiny hypermetabolic focus within the L3, T11.  Suspicious for also early metastatic disease.  · 4/26/2021 patient underwent CT-guided biopsy of sacral lesion, pathology was consistent with metastatic breast cancer ER and [place] positive HER-2/bishop was  negative.      Past Medical History:   Diagnosis Date   • Allergic    • Breast cancer (CMS/HCC) 2017    mets to lymph nodes; did not do radiation   • Cancer of unknown origin (CMS/HCC)    • Compression fx, thoracic spine, open, initial encounter (CMS/HCC)    • Coronary artery disease    • Diabetes mellitus (CMS/HCC)    • Heart disease, unspecified    • Hepatitis C    • Hypertension    • Obesity (BMI 30-39.9) 2021   • Sleep apnea     no machine   • Type 2 diabetes mellitus (CMS/HCC) 2017       Past Surgical History:   Procedure Laterality Date   • BACK SURGERY      neck   • BREAST RECONSTRUCTION Bilateral    • CARDIAC ABLATION       atrial tachycardia x 5 ablations    • CARDIAC CATHETERIZATION     • CARDIAC SURGERY      stent placed in aorta   • CARDIAC SURGERY      6 surgeries as baby    • CERVICAL FUSION ANTERIOR WITH ARTIFICIAL DISCECTOMY IMPLANTATION N/A 2020    Procedure: C4 VERTEBRECTOMY AND ANTERIOR CERIVCAL DISCECTOMY WITH FUSION OF CERVICAL THREE THROUGH FIVE WITH REMOVAL OF HARDWARE C5-C6;  Surgeon: Mark Kaba MD;  Location: [facility] MAIN OR;  Service: Neurosurgery;  Laterality: N/A;   •  SECTION      x2   • COLONOSCOPY N/A 10/23/2020    Procedure: COLONOSCOPY WITH POLYPECTOMY X6;  Surgeon: Stanley Bourne MD;  Location: [facility] ENDOSCOPY;  Service: Gastroenterology;  Laterality: N/A;  POLYPS, INTERNAL HEMORRHOIDS   • ENDOSCOPY N/A 10/23/2020    Procedure: ESOPHAGOGASTRODUODENOSCOPY WITH BIOPSY X 1 AREA;  Surgeon: Stanley Bourne MD;  Location: [facility] ENDOSCOPY;  Service: Gastroenterology;  Laterality: N/A;  GASTRITIS, ESOPHAGITIS, HIATAL HERNIA   • KNEE SURGERY     • MASTECTOMY Bilateral    • NECK SURGERY     • PACEMAKER IMPLANTATION           Current Outpatient Medications:   •  Accu-Chek FastClix Lancets misc, For finger stick 4x/d, Disp: 100 each, Rfl: 12  •  Alcohol Swabs 70 % pads, Use tid, Disp: 300 each, Rfl: 2  •  anastrozole (Arimidex) 1 MG  "tablet, Take 1 tablet by mouth Daily., Disp: 30 tablet, Rfl: 6  •  aspirin 81 MG chewable tablet, Chew 1 tablet Daily. (Patient taking differently: Chew 81 mg Daily. Hold DOS), Disp: 30 tablet, Rfl: 1  •  Blood Glucose Monitoring Suppl (ACCU-CHEK TEODORO PLUS) w/Device kit, Use as directed   DX  E11.9, Disp: 1 kit, Rfl: 0  •  Calcium Carbonate-Vit D-Min (Calcium 600+D Plus Minerals) 600-400 MG-UNIT chewable tablet, Chew 600 mg 2 (Two) Times a Day., Disp: 60 each, Rfl: 6  •  Continuous Blood Gluc Sensor (FreeStyle Rajeev 14 Day Sensor) misc, 1 each Every 14 (Fourteen) Days., Disp: 6 each, Rfl: 1  •  cyclobenzaprine (FLEXERIL) 10 MG tablet, Take 1 tablet by mouth 3 (Three) Times a Day As Needed for Muscle Spasms., Disp: 90 tablet, Rfl: 0  •  glucose blood (Accu-Chek Teodoro Plus) test strip, To check blood sugar 4x/d, Disp: 150 each, Rfl: 12  •  insulin NPH-insulin regular (humuLIN 70/30,novoLIN 70/30) (70-30) 100 UNIT/ML injection, Inject 40 Units under the skin into the appropriate area as directed Every Morning., Disp: , Rfl:   •  insulin NPH-insulin regular (humuLIN 70/30,novoLIN 70/30) (70-30) 100 UNIT/ML injection, Inject 30 Units under the skin into the appropriate area as directed Every Evening., Disp: , Rfl:   •  insulin NPH-insulin regular (NovoLIN 70/30) (70-30) 100 UNIT/ML injection, Inject  under the skin into the appropriate area as directed., Disp: , Rfl:   •  Insulin Syringe 31G X 5/16\" 1 ML misc, 1 syringe 2 (Two) Times a Day., Disp: 100 each, Rfl: 1  •  lisinopril (PRINIVIL,ZESTRIL) 20 MG tablet, Take 20 mg by mouth Daily., Disp: , Rfl:   •  rosuvastatin (Crestor) 20 MG tablet, Take 1 tablet by mouth Every Night., Disp: 90 tablet, Rfl: 0  •  traMADol HCl (ULTRAM) 100 MG tablet, Take 1 tablet by mouth Every Night., Disp: 30 tablet, Rfl: 0    Allergies   Allergen Reactions   • Promethazine Mental Status Change   • Tape Other (See Comments)     .blisters         Family History   Problem Relation Age of Onset "   • Lymphoma Mother    • Heart disease Mother    • Stroke Mother    • Cancer Mother    • Other Father    • Diabetes Sister    • Thyroid disease Sister    • No Known Problems Brother    • No Known Problems Brother    • Diabetes type I Half-Sister    • Thyroid cancer Half-Sister    • Cancer Maternal Aunt        Cancer-related family history includes Cancer in her maternal aunt and mother; Lymphoma in her mother; Thyroid cancer in her half-sister.    Social History     Tobacco Use   • Smoking status: Never Smoker   • Smokeless tobacco: Never Used   Vaping Use   • Vaping Use: Never used   Substance Use Topics   • Alcohol use: Yes     Alcohol/week: 1.0 standard drinks     Types: 1 Glasses of wine per week     Comment: rare   • Drug use: Not Currently       HPI, ROS and PFSH have been reviewed and confirmed on 5/7/2021.     SUBJECTIVE:      Patient is here today to review her pathology results.  Her spouse is with her today.        REVIEW OF SYSTEMS:    Review of Systems   Constitutional: Negative for chills and fever.   HENT: Negative for ear pain, mouth sores, nosebleeds and sore throat.    Eyes: Negative for photophobia and visual disturbance.   Respiratory: Negative for wheezing and stridor.    Cardiovascular: Negative for chest pain and palpitations.   Gastrointestinal: Negative for abdominal pain, diarrhea, nausea and vomiting.   Endocrine: Negative for cold intolerance and heat intolerance.   Genitourinary: Negative for dysuria and hematuria.   Musculoskeletal: Positive for arthralgias. Negative for joint swelling and neck stiffness.        Neck pain   Skin: Negative for color change and rash.   Neurological: Negative for seizures and syncope.   Hematological: Negative for adenopathy.   Psychiatric/Behavioral: Negative for agitation, confusion and hallucinations.     OBJECTIVE:    Vitals:    05/07/21 1206   BP: (!) 160/102   Pulse: 87   Resp: 18   Temp: 97.3 °F (36.3 °C)   Weight: 72.9 kg (160 lb 12.8 oz)   Height:  "154.9 cm (61\")   PainSc:   6   PainLoc: Generalized  Comment: back and neck     Body mass index is 30.38 kg/m².    ECOG  (1) Restricted in physically strenuous activity, ambulatory and able to do work of light nature    Physical Exam  Vitals and nursing note reviewed.   Constitutional:       General: She is not in acute distress.     Appearance: She is not diaphoretic.   HENT:      Head: Normocephalic and atraumatic.   Eyes:      General: No scleral icterus.        Right eye: No discharge.         Left eye: No discharge.      Conjunctiva/sclera: Conjunctivae normal.   Neck:      Thyroid: No thyromegaly.   Cardiovascular:      Rate and Rhythm: Normal rate and regular rhythm.      Heart sounds: Normal heart sounds. No friction rub. No gallop.    Pulmonary:      Effort: Pulmonary effort is normal. No respiratory distress.      Breath sounds: No stridor. No wheezing.   Abdominal:      General: Bowel sounds are normal.      Palpations: Abdomen is soft. There is no mass.      Tenderness: There is no abdominal tenderness. There is no guarding or rebound.   Musculoskeletal:         General: No tenderness. Normal range of motion.      Cervical back: Normal range of motion and neck supple.      Comments: Neck pain.  Patient has a neck collar.   Lymphadenopathy:      Cervical: No cervical adenopathy.   Skin:     General: Skin is warm.      Findings: No erythema or rash.   Neurological:      Mental Status: She is alert and oriented to person, place, and time.      Motor: No abnormal muscle tone.   Psychiatric:         Behavior: Behavior normal.     I have reexamined the patient and the results are consistent with the previously documented exam. Мария Nunez MD     RECENT LABS    WBC   Date Value Ref Range Status   05/07/2021 7.73 3.40 - 10.80 10*3/mm3 Final     RBC   Date Value Ref Range Status   05/07/2021 5.37 (H) 3.77 - 5.28 10*6/mm3 Final     Hemoglobin   Date Value Ref Range Status   05/07/2021 14.9 12.0 - 15.9 " g/dL Final     Hematocrit   Date Value Ref Range Status   05/07/2021 45.9 34.0 - 46.6 % Final     MCV   Date Value Ref Range Status   05/07/2021 85.5 79.0 - 97.0 fL Final     MCH   Date Value Ref Range Status   05/07/2021 27.7 26.6 - 33.0 pg Final     MCHC   Date Value Ref Range Status   05/07/2021 32.5 31.5 - 35.7 g/dL Final     RDW   Date Value Ref Range Status   05/07/2021 13.2 12.3 - 15.4 % Final     RDW-SD   Date Value Ref Range Status   05/07/2021 40.5 37.0 - 54.0 fl Final     MPV   Date Value Ref Range Status   05/07/2021 10.2 6.0 - 12.0 fL Final     Platelets   Date Value Ref Range Status   05/07/2021 127 (L) 140 - 450 10*3/mm3 Final     Neutrophil %   Date Value Ref Range Status   05/07/2021 66.1 42.7 - 76.0 % Final     Lymphocyte %   Date Value Ref Range Status   05/07/2021 26.9 19.6 - 45.3 % Final     Monocyte %   Date Value Ref Range Status   05/07/2021 5.4 5.0 - 12.0 % Final     Eosinophil %   Date Value Ref Range Status   05/07/2021 1.3 0.3 - 6.2 % Final     Basophil %   Date Value Ref Range Status   05/07/2021 0.3 0.0 - 1.5 % Final     Immature Grans %   Date Value Ref Range Status   07/20/2020 0.7 (H) 0.0 - 0.5 % Final     Neutrophils, Absolute   Date Value Ref Range Status   05/07/2021 5.11 1.70 - 7.00 10*3/mm3 Final     Lymphocytes, Absolute   Date Value Ref Range Status   05/07/2021 2.08 0.70 - 3.10 10*3/mm3 Final     Monocytes, Absolute   Date Value Ref Range Status   05/07/2021 0.42 0.10 - 0.90 10*3/mm3 Final     Eosinophils, Absolute   Date Value Ref Range Status   05/07/2021 0.10 0.00 - 0.40 10*3/mm3 Final     Basophils, Absolute   Date Value Ref Range Status   05/07/2021 0.02 0.00 - 0.20 10*3/mm3 Final     Immature Grans, Absolute   Date Value Ref Range Status   07/20/2020 0.05 0.00 - 0.05 10*3/mm3 Final     nRBC   Date Value Ref Range Status   04/26/2021 0.0 0.0 - 0.2 /100 WBC Final       Lab Results   Component Value Date    GLUCOSE 50 (L) 04/14/2021    BUN 13 04/14/2021    CREATININE 0.74  04/14/2021    EGFRIFNONA 81 04/14/2021    EGFRIFAFRI >60 10/12/2018    BCR 17.6 04/14/2021    K 4.1 04/14/2021    CO2 24.5 04/14/2021    CALCIUM 9.7 04/14/2021    PROTENTOTREF 7.4 12/14/2020    ALBUMIN 4.70 04/14/2021    LABIL2 0.9 12/14/2020    AST 21 04/14/2021    ALT 23 04/14/2021         Assessment/Plan     Malignant neoplasm of female breast, unspecified estrogen receptor status, unspecified laterality, unspecified site of breast (CMS/HCC)  - CBC & Differential    Spine metastasis (CMS/HCC)  - CBC & Differential    Lesion of lumbar spine  - CBC & Differential      ASSESSMENT:    · Metastatic breast cancer presenting as 1.1 cm mixed lytic/sclerotic hypermetabolic lesion in the left hemisacrum suspicious for metastatic disease 1.2 cm sclerotic lesion on L4, small L3 lesion and T11 lesion.  Tumor is ER positive, AK positive and HER-2/bishop negative.  Patient is currently on aromatase inhibitor, I have therefore recommended addition of CDK 4 inhibitor with Ibrance.  She will also benefit from biphosphonate therapy to prevent skeletal fractures.  Will discontinue Prolia and begin Xgeva as well.  Reviewed the side effects of Ibrance in detail with patient to include but not limited to fatigue, hepatic function abnormalities, interstitial lung disease, pancytopenia.  · ypT2 N1 aM0 status post left modified radical mastectomy with lymph node dissection and right prophylactic mastectomy in 2017 performed at [facility].  ER positive, AK positive and HER-2/bishop negative.  Status post bilateral chest wall reconstruction.  According to patient, she tolerated Arimidex very well except that she also had osteoporosis therefore Arimidex was discontinued at that time  · Intolerance of tamoxifen in the past  · Osteoporosis: On Prolia: We will transition to Xgeva since she has bone metastases  · Status post neoadjuvant Arimidex prior to bilateral mastectomies  · Thrombocytopenia: Work-up was - December  2020  · Neck pain status post cervical spine surgery: Patient has a soft neck collar  · Complex cardiac medical history including tetralogy of Fallot, coarctation of aorta, VSD status post repair.  Status post stent placement for coarctation of aorta  · Personal history and strong family history of breast cancer in multiple in the relatives on paternal side of the family including 4 paternal aunts in their 30s and 40s and 2 maternal aunts at age of 20s and 50s.  There is concern for possible hereditary breast cancer syndrome.  Patient may be  interested in pursuing cancer genetics for her management.        Discussion    Patient has metastatic breast cancer estrogen and progesterone dependent and HER-2/bishop negative.  She is currently on Arimidex.  We will add Ibrance.  We will also discontinue Prolia and begin Xgeva to help reduce skeletal events.           Plans:     · Reviewed path report  · CMP, CBC, CEA, CA 15-3, CA 27.29.  · Discontinue Ultram 100 mg p.o. every 12 as needed for pain due to heartburn and begin Percocet 5 mg p.o. every 4-6 hours as needed for pain number 40 tablets  · Ibrance ordered in [place].  Continue Arimidex  · Reviewed work-up for thrombocytopenia which was negative  · Reviewed her bone density which showed osteoporosis  · Begin Xgeva 120 mg subcu monthly  · Continue Arimidex 1 mg p.o. daily  · Continue Os-Chetan D 600 mg tablets twice a day  · Thrombocytopenia work-up reviewed with patient and negative  · She cannot tolerate tamoxifen.  Explained to patient that we can still treat her with Arimidex    · All questions answered  · Follow-up in 4 weeks  · Information on Ibrance was given to patient and her spouse who is present today           I have reviewed labs results, imaging, vitals, and medications with the patient today.  Will follow up in 4 weeks with me.        Patient verbalized understanding and is in agreement of the above plan.               I spent 40 total minutes, face-to-face,  caring for Radhaa today.  90% of this time involved counseling and/or coordination of care as documented within this note.

## 2021-05-06 ENCOUNTER — TELEPHONE (OUTPATIENT)
Dept: ONCOLOGY | Facility: HOSPITAL | Age: 61
End: 2021-05-06

## 2021-05-06 NOTE — TELEPHONE ENCOUNTER
Case Management/ Note    Patient Name: Gladys Garrison  YOB: 1960  MRN #: [id number]    OSW called patient as follow up from phone call on 5/3. Left a message requesting call back.     OSW received a call back from patient who is alert and oriented to person, place and time. She is open and spoke about many aspects of her life. She spoke about her family. She has been  for 41 years to Lavon. They have two sons, and eight grandchildren. She worries most about her son, Enrike stating that he and his wife do not manage money well and ask them for money. She is afraid if something happens to both she and Lavon that they will not manage the money well enough for them to provide for their youngest grandson. She said her  Lavon had a heart attack several years ago and was told he would not live long. She expressed that both of them are afraid of dying and leaving their grandchildren. Supportive care provided.     She said both she and her  are on SSD. Her net SSD check is $594 due to an additional $158 that SSA takes out of her check. Both she and Lavon work part-time jobs. She recently started a job at [employer]; Lavon works at [employer]. Basic needs are met.     She spoke about the strength she has and how she feels she keeps her family together. She verbalized struggling to ask others for help from herself. She was encouraged to do so when needed. She said her side of the phone connection was bad and abruptly hung up. OSW will remain available.     Electronically signed by:   Kita Falcon LCSW, OSW-C  05/06/21, 12:56 EDT

## 2021-05-07 ENCOUNTER — OFFICE VISIT (OUTPATIENT)
Dept: ONCOLOGY | Facility: CLINIC | Age: 61
End: 2021-05-07

## 2021-05-07 ENCOUNTER — LAB (OUTPATIENT)
Dept: LAB | Facility: HOSPITAL | Age: 61
End: 2021-05-07

## 2021-05-07 ENCOUNTER — NURSE NAVIGATOR (OUTPATIENT)
Dept: ONCOLOGY | Facility: CLINIC | Age: 61
End: 2021-05-07

## 2021-05-07 VITALS
RESPIRATION RATE: 18 BRPM | WEIGHT: 160.8 LBS | BODY MASS INDEX: 30.36 KG/M2 | TEMPERATURE: 97.3 F | DIASTOLIC BLOOD PRESSURE: 102 MMHG | SYSTOLIC BLOOD PRESSURE: 160 MMHG | HEIGHT: 61 IN | HEART RATE: 87 BPM

## 2021-05-07 DIAGNOSIS — C50.919 MALIGNANT NEOPLASM OF FEMALE BREAST, UNSPECIFIED ESTROGEN RECEPTOR STATUS, UNSPECIFIED LATERALITY, UNSPECIFIED SITE OF BREAST (HCC): ICD-10-CM

## 2021-05-07 DIAGNOSIS — G89.3 CANCER ASSOCIATED PAIN: ICD-10-CM

## 2021-05-07 DIAGNOSIS — C50.919 MALIGNANT NEOPLASM OF FEMALE BREAST, UNSPECIFIED ESTROGEN RECEPTOR STATUS, UNSPECIFIED LATERALITY, UNSPECIFIED SITE OF BREAST (HCC): Primary | ICD-10-CM

## 2021-05-07 DIAGNOSIS — M89.9 LESION OF LUMBAR SPINE: ICD-10-CM

## 2021-05-07 DIAGNOSIS — C79.51 SPINE METASTASIS: ICD-10-CM

## 2021-05-07 LAB
BASOPHILS # BLD AUTO: 0.02 10*3/MM3 (ref 0–0.2)
BASOPHILS NFR BLD AUTO: 0.3 % (ref 0–1.5)
DEPRECATED RDW RBC AUTO: 40.5 FL (ref 37–54)
EOSINOPHIL # BLD AUTO: 0.1 10*3/MM3 (ref 0–0.4)
EOSINOPHIL NFR BLD AUTO: 1.3 % (ref 0.3–6.2)
ERYTHROCYTE [DISTWIDTH] IN BLOOD BY AUTOMATED COUNT: 13.2 % (ref 12.3–15.4)
HCT VFR BLD AUTO: 45.9 % (ref 34–46.6)
HGB BLD-MCNC: 14.9 G/DL (ref 12–15.9)
LYMPHOCYTES # BLD AUTO: 2.08 10*3/MM3 (ref 0.7–3.1)
LYMPHOCYTES NFR BLD AUTO: 26.9 % (ref 19.6–45.3)
MCH RBC QN AUTO: 27.7 PG (ref 26.6–33)
MCHC RBC AUTO-ENTMCNC: 32.5 G/DL (ref 31.5–35.7)
MCV RBC AUTO: 85.5 FL (ref 79–97)
MONOCYTES # BLD AUTO: 0.42 10*3/MM3 (ref 0.1–0.9)
MONOCYTES NFR BLD AUTO: 5.4 % (ref 5–12)
NEUTROPHILS NFR BLD AUTO: 5.11 10*3/MM3 (ref 1.7–7)
NEUTROPHILS NFR BLD AUTO: 66.1 % (ref 42.7–76)
PLATELET # BLD AUTO: 127 10*3/MM3 (ref 140–450)
PMV BLD AUTO: 10.2 FL (ref 6–12)
RBC # BLD AUTO: 5.37 10*6/MM3 (ref 3.77–5.28)
WBC # BLD AUTO: 7.73 10*3/MM3 (ref 3.4–10.8)

## 2021-05-07 PROCEDURE — 36415 COLL VENOUS BLD VENIPUNCTURE: CPT

## 2021-05-07 PROCEDURE — 99215 OFFICE O/P EST HI 40 MIN: CPT | Performed by: INTERNAL MEDICINE

## 2021-05-07 PROCEDURE — 85025 COMPLETE CBC W/AUTO DIFF WBC: CPT

## 2021-05-07 RX ORDER — OXYCODONE HYDROCHLORIDE AND ACETAMINOPHEN 5; 325 MG/1; MG/1
1 TABLET ORAL TAKE AS DIRECTED
Qty: 40 TABLET | Refills: 0 | Status: SHIPPED | OUTPATIENT
Start: 2021-05-07 | End: 2021-07-15 | Stop reason: SDUPTHER

## 2021-05-07 NOTE — PROGRESS NOTES
I accompanied the patient and her  to her appointment with Dr. Nunez today.  The patient had a recent biopsy that was consistent with metastatic breast cancer.  Dr. Nunez explained to the patient that her breast cancer is considered a stage 4 and that it can not be cured but it can be treated.  The patient has been started on Arimidex.  Dr. Nunez discussed adding Ibrance and Xgeva for the patient's treatment.  Dr. Nunez discussed the action of the drugs and the side effects of each prescription.  The patient stated that she has been having acid reflux with the Ultram that she has been prescribed.  Dr. Nunez is going to send in low dose Percocet for the patient.  The patient stated that she is not taking the medication every day.  The patient was given my direct contact information and information on Ibrance and Xgeva.  She was notified that Eda, Pharmacist, would call her to scheduled education on the medication.  The patient will follow up with Dr. Nunez in 4 weeks.

## 2021-05-10 PROBLEM — C79.51 BONE METASTASIS: Status: ACTIVE | Noted: 2021-05-10

## 2021-05-14 ENCOUNTER — MEDICATION THERAPY MANAGEMENT (OUTPATIENT)
Dept: PHARMACY | Facility: HOSPITAL | Age: 61
End: 2021-05-14

## 2021-05-14 DIAGNOSIS — C50.919 MALIGNANT NEOPLASM OF FEMALE BREAST, UNSPECIFIED ESTROGEN RECEPTOR STATUS, UNSPECIFIED LATERALITY, UNSPECIFIED SITE OF BREAST (HCC): Primary | ICD-10-CM

## 2021-05-14 RX ORDER — ANASTROZOLE 1 MG/1
1 TABLET ORAL DAILY
Qty: 30 TABLET | Refills: 6 | Status: SHIPPED | OUTPATIENT
Start: 2021-05-14 | End: 2022-01-10

## 2021-05-14 RX ORDER — ONDANSETRON HYDROCHLORIDE 8 MG/1
8 TABLET, FILM COATED ORAL 3 TIMES DAILY PRN
Qty: 30 TABLET | Refills: 5 | Status: SHIPPED | OUTPATIENT
Start: 2021-05-14 | End: 2022-01-10

## 2021-05-14 NOTE — PROGRESS NOTES
[facility] Note: Ibrance    Verified with Dean at [facility] that RX for Ibrance was received.  Medication does require PA and they will submit to us via CoverMyMeds.  Called Gladys with call back to [facility] [phone] to let her know that we are waiting on PA and that [facility] might be calling her depending on copay once approved to apply for financial assistance.

## 2021-05-17 ENCOUNTER — TELEPHONE (OUTPATIENT)
Dept: ONCOLOGY | Facility: CLINIC | Age: 61
End: 2021-05-17

## 2021-05-17 NOTE — TELEPHONE ENCOUNTER
Pharmacy Name:  [facility]     Pharmacy representative name: CRYSATL    Pharmacy representative phone number: [phone]    What medication are you calling in regards to: IBRANCE     What question does the pharmacy have:  NEEDS RX RE-SENT AS THEY HAD COMPUTER GLITCH LAST FRIDAY    Who is the provider that prescribed the medication: VALDO    Additional notes:  [phone]

## 2021-05-18 ENCOUNTER — TELEPHONE (OUTPATIENT)
Dept: ONCOLOGY | Facility: CLINIC | Age: 61
End: 2021-05-18

## 2021-05-18 DIAGNOSIS — C50.919 MALIGNANT NEOPLASM OF FEMALE BREAST, UNSPECIFIED ESTROGEN RECEPTOR STATUS, UNSPECIFIED LATERALITY, UNSPECIFIED SITE OF BREAST (HCC): Primary | ICD-10-CM

## 2021-05-18 NOTE — TELEPHONE ENCOUNTER
Caller: SANDRINE    Relationship to patient: GERMAINE PHARMACIST    Best call back number: [phone]    PHARMACIST STATES THERE IS NO CO-PAY ASSISTANCE FOR IBRANCE. THERE HAS NOT BEEN AT ALL THIS YEAR. PT HAS DEDUCTIBLE OF $3,000. DO WE WANT HER TO FAX OVER APPLICATION FOR FREE DRUGS OR ANOTHER ROUTE?

## 2021-05-18 NOTE — TELEPHONE ENCOUNTER
Caller: EBONIE    Relationship: [facility]    Best call back number: [phone]    What is the best time to reach you: ANYTIME    What was the call regarding: EBONIE CALLED TO INFORM THAT THE PATIENTS PRESCRIPTION FOR IBRANCE IS COVERED BUT HAS AN EXTREMELY HIGH COPAY. SHE SAYS THEY ARE GOING TO SEND FINANCIAL ASSITANCE PAPERWORK WITH HER PRESCRIPTION FOR HER TO FILL OUT. SHE ALSO SAYS THAT FOR HER FIRST FILL, THEY ARE USING A FREE VOUCHER SO SHE CAN GET STARTED ON IT WHIILE WE GET HER FIANANCIAL ASSISTANCE TAKEN CARE OF.    Do you require a callback: NO

## 2021-05-18 NOTE — TELEPHONE ENCOUNTER
Called pt to schedule lab appointment. I told her that we need to check her renal function to see if the Xgeva could get approved. Pt verbalized understanding, lab appointment scheduled.

## 2021-05-19 ENCOUNTER — MEDICATION THERAPY MANAGEMENT (OUTPATIENT)
Dept: PHARMACY | Facility: HOSPITAL | Age: 61
End: 2021-05-19

## 2021-05-19 ENCOUNTER — LAB (OUTPATIENT)
Dept: LAB | Facility: HOSPITAL | Age: 61
End: 2021-05-19

## 2021-05-19 DIAGNOSIS — C50.919 MALIGNANT NEOPLASM OF FEMALE BREAST, UNSPECIFIED ESTROGEN RECEPTOR STATUS, UNSPECIFIED LATERALITY, UNSPECIFIED SITE OF BREAST (HCC): Primary | ICD-10-CM

## 2021-05-19 DIAGNOSIS — C50.919 MALIGNANT NEOPLASM OF FEMALE BREAST, UNSPECIFIED ESTROGEN RECEPTOR STATUS, UNSPECIFIED LATERALITY, UNSPECIFIED SITE OF BREAST (HCC): ICD-10-CM

## 2021-05-19 LAB
ALBUMIN SERPL-MCNC: 4.8 G/DL (ref 3.5–5.2)
ALBUMIN/GLOB SERPL: 1.4 G/DL
ALP SERPL-CCNC: 75 U/L (ref 39–117)
ALT SERPL W P-5'-P-CCNC: 27 U/L (ref 1–33)
ANION GAP SERPL CALCULATED.3IONS-SCNC: 15 MMOL/L (ref 5–15)
AST SERPL-CCNC: 33 U/L (ref 1–32)
BASOPHILS # BLD AUTO: 0.02 10*3/MM3 (ref 0–0.2)
BASOPHILS NFR BLD AUTO: 0.2 % (ref 0–1.5)
BILIRUB SERPL-MCNC: 0.4 MG/DL (ref 0–1.2)
BUN SERPL-MCNC: 16 MG/DL (ref 6–20)
BUN/CREAT SERPL: 18.8 (ref 7–25)
CALCIUM SPEC-SCNC: 10.6 MG/DL (ref 8.6–10.5)
CHLORIDE SERPL-SCNC: 102 MMOL/L (ref 98–107)
CO2 SERPL-SCNC: 20 MMOL/L (ref 22–29)
CREAT SERPL-MCNC: 0.85 MG/DL (ref 0.57–1)
DEPRECATED RDW RBC AUTO: 40.7 FL (ref 37–54)
EOSINOPHIL # BLD AUTO: 0.07 10*3/MM3 (ref 0–0.4)
EOSINOPHIL NFR BLD AUTO: 0.8 % (ref 0.3–6.2)
ERYTHROCYTE [DISTWIDTH] IN BLOOD BY AUTOMATED COUNT: 13.1 % (ref 12.3–15.4)
GFR SERPL CREATININE-BSD FRML MDRD: 69 ML/MIN/1.73
GLOBULIN UR ELPH-MCNC: 3.5 GM/DL
GLUCOSE SERPL-MCNC: 73 MG/DL (ref 65–99)
HCT VFR BLD AUTO: 46 % (ref 34–46.6)
HGB BLD-MCNC: 14.8 G/DL (ref 12–15.9)
LYMPHOCYTES # BLD AUTO: 2.76 10*3/MM3 (ref 0.7–3.1)
LYMPHOCYTES NFR BLD AUTO: 29.6 % (ref 19.6–45.3)
MCH RBC QN AUTO: 27.9 PG (ref 26.6–33)
MCHC RBC AUTO-ENTMCNC: 32.2 G/DL (ref 31.5–35.7)
MCV RBC AUTO: 86.8 FL (ref 79–97)
MONOCYTES # BLD AUTO: 0.56 10*3/MM3 (ref 0.1–0.9)
MONOCYTES NFR BLD AUTO: 6 % (ref 5–12)
NEUTROPHILS NFR BLD AUTO: 5.92 10*3/MM3 (ref 1.7–7)
NEUTROPHILS NFR BLD AUTO: 63.4 % (ref 42.7–76)
PLATELET # BLD AUTO: 108 10*3/MM3 (ref 140–450)
PMV BLD AUTO: 11.3 FL (ref 6–12)
POTASSIUM SERPL-SCNC: 4 MMOL/L (ref 3.5–5.2)
PROT SERPL-MCNC: 8.3 G/DL (ref 6–8.5)
RBC # BLD AUTO: 5.3 10*6/MM3 (ref 3.77–5.28)
SODIUM SERPL-SCNC: 137 MMOL/L (ref 136–145)
WBC # BLD AUTO: 9.33 10*3/MM3 (ref 3.4–10.8)

## 2021-05-19 PROCEDURE — 85025 COMPLETE CBC W/AUTO DIFF WBC: CPT

## 2021-05-19 PROCEDURE — 80053 COMPREHEN METABOLIC PANEL: CPT

## 2021-05-19 PROCEDURE — 36415 COLL VENOUS BLD VENIPUNCTURE: CPT

## 2021-05-19 NOTE — PROGRESS NOTES
Oral Chemotherapy Teaching      Patient Name/:  Gladys Garrison   1960  Oral Chemotherapy Regimen:  Ibrance 125mg PO qday for 21 days then off 7 days  Date Started Medication: 21    Initial Teaching Follow Up Comments     Safety     Storage instructions (away from children; away from heat/cold, sunlight, or moisture), handling - use of gloves (caregivers), washing hands after touching pills, managing waste     “How are you storing your medications?”, reminders on storage, proper handling (caregivers using gloves, washing hands, away from children, managing waste, etc.), disposal of medication with D/C or dosage change    Patient counseled on hazardous handling and storage precautions. Discussed plan to wash hands after handling. Caregivers to handle with latex gloves. Reviewed safe sex practices. Discussed appropriate management of bodily fluids. Store at room temperature, away from pets and children. Pt verbalized understanding.        Adherence      patient and/or caregiver on how to take medication, take with/without food, assess their adherence potential, stress importance of adherence, ways to manage adherence (pill boxes, phone reminders, calendars), what to do if miss a dose   “How are you taking your medication?” “How are you remembering to take your medication?”, “How many doses have you missed?”, determine reasons for non-adherence (not remembering, side effects, etc), ways to improve, overadherence? Remind patient of ways to improve/maintain adherence   Counseled patient to take medication at same time each day, with or without food.  Pt will take in the evening with dinner.  Discussed to take 21 days straight then 7 days off. Discussed how to handle missed doses and to never take 2 doses at once.     Side Effects/Adverse Reactions      patient on potential side effects, s/s, ways to manage, when to call MD/seek help     Determine if patient experiencing side effects, ways to  manage  Counseled on potential SE including decreased hgb, decreased PLT count and increased risk of bleeding, decreased WBC count and increased risk of infection, changes in liver function, fatigue, nausea or vomiting, hair loss, mouth irritation. Discussed management of common symptoms and and included handouts on managing potential side effects and when to contact the office.       Miscellaneous     Food interactions, DDIs, financial issues Determine if patient started any new medications since being placed on oral chemo (analyze for DDI) Drug-food interaction: grapefruit and grapefruit juice, no DDI noted     Additional Notes: Discussed aforementioned material with patient in clinic. All questions and concerns addressed. Pt asked when she should get her covid vaccine. Asked Dr. Nunez and she said patient is ok to get anytime now and with start of Ibrance. Gave the patient this information in a follow up phone call.  Provided patient with my contact information and instructions to call should additional questions arise. Obtained signed CCA and consent. Provided patient with personalized treatment plan.

## 2021-05-21 ENCOUNTER — TELEPHONE (OUTPATIENT)
Dept: ONCOLOGY | Facility: CLINIC | Age: 61
End: 2021-05-21

## 2021-05-25 ENCOUNTER — RESEARCH ENCOUNTER (OUTPATIENT)
Dept: ONCOLOGY | Facility: CLINIC | Age: 61
End: 2021-05-25

## 2021-05-25 NOTE — RESEARCH
Reviewed chart to determine if patient meets criteria for research trial; patient does not meet criteria for current trials available at our site

## 2021-05-28 DIAGNOSIS — C50.919 MALIGNANT NEOPLASM OF FEMALE BREAST, UNSPECIFIED ESTROGEN RECEPTOR STATUS, UNSPECIFIED LATERALITY, UNSPECIFIED SITE OF BREAST (HCC): Primary | ICD-10-CM

## 2021-06-01 ENCOUNTER — MEDICATION THERAPY MANAGEMENT (OUTPATIENT)
Dept: PHARMACY | Facility: HOSPITAL | Age: 61
End: 2021-06-01

## 2021-06-01 RX ORDER — PROCHLORPERAZINE MALEATE 10 MG
10 TABLET ORAL EVERY 6 HOURS PRN
Qty: 90 TABLET | Refills: 1 | OUTPATIENT
Start: 2021-06-01 | End: 2021-09-01

## 2021-06-03 ENCOUNTER — TELEPHONE (OUTPATIENT)
Dept: ONCOLOGY | Facility: CLINIC | Age: 61
End: 2021-06-03

## 2021-06-04 ENCOUNTER — LAB (OUTPATIENT)
Dept: LAB | Facility: HOSPITAL | Age: 61
End: 2021-06-04

## 2021-06-04 DIAGNOSIS — C50.919 MALIGNANT NEOPLASM OF FEMALE BREAST, UNSPECIFIED ESTROGEN RECEPTOR STATUS, UNSPECIFIED LATERALITY, UNSPECIFIED SITE OF BREAST (HCC): ICD-10-CM

## 2021-06-04 LAB
BASOPHILS # BLD AUTO: 0 10*3/MM3 (ref 0–0.2)
BASOPHILS NFR BLD AUTO: 0 % (ref 0–1.5)
DEPRECATED RDW RBC AUTO: 37.1 FL (ref 37–54)
EOSINOPHIL # BLD AUTO: 0.01 10*3/MM3 (ref 0–0.4)
EOSINOPHIL NFR BLD AUTO: 0.4 % (ref 0.3–6.2)
ERYTHROCYTE [DISTWIDTH] IN BLOOD BY AUTOMATED COUNT: 12.5 % (ref 12.3–15.4)
HCT VFR BLD AUTO: 37.8 % (ref 34–46.6)
HGB BLD-MCNC: 12.5 G/DL (ref 12–15.9)
LYMPHOCYTES # BLD AUTO: 1.33 10*3/MM3 (ref 0.7–3.1)
LYMPHOCYTES NFR BLD AUTO: 48.7 % (ref 19.6–45.3)
MCH RBC QN AUTO: 28.7 PG (ref 26.6–33)
MCHC RBC AUTO-ENTMCNC: 33.1 G/DL (ref 31.5–35.7)
MCV RBC AUTO: 86.7 FL (ref 79–97)
MONOCYTES # BLD AUTO: 0.09 10*3/MM3 (ref 0.1–0.9)
MONOCYTES NFR BLD AUTO: 3.3 % (ref 5–12)
NEUTROPHILS NFR BLD AUTO: 1.3 10*3/MM3 (ref 1.7–7)
NEUTROPHILS NFR BLD AUTO: 47.6 % (ref 42.7–76)
PLATELET # BLD AUTO: 68 10*3/MM3 (ref 140–450)
PMV BLD AUTO: 10.5 FL (ref 6–12)
RBC # BLD AUTO: 4.36 10*6/MM3 (ref 3.77–5.28)
WBC # BLD AUTO: 2.73 10*3/MM3 (ref 3.4–10.8)

## 2021-06-04 PROCEDURE — 85025 COMPLETE CBC W/AUTO DIFF WBC: CPT

## 2021-06-04 PROCEDURE — 36415 COLL VENOUS BLD VENIPUNCTURE: CPT

## 2021-06-04 NOTE — PROGRESS NOTES
Hematology/Oncology Outpatient Follow Up    PATIENT NAME:Gladys Garrison  :1960  MRN: [id number]  PRIMARY CARE PHYSICIAN: Kourtney Coffman APRN  REFERRING PHYSICIAN: Kourtney Coffman APRN    Chief Complaint   Patient presents with   • Follow-up     Malignant neoplasm of female breast        HISTORY OF PRESENT ILLNESS:     This is a 60-year-old female who multiple comorbidities including congenital abnormalities such as hypoplastic kidney, tetralogy of Fallot, coarctation of the aorta and congenital VSD status post repair.  Patient developed syncopal episode and for that reason she had she had a CT scan of the chest which showed mass in the left breast.  She had diagnostic mammogram and ultrasound which showed a 2 cm spiculated mass in the left breast at the 1 o'clock position 6 cm from the nipple.  Biopsy of the left breast mass revealed invasive moderately differentiated carcinoma ER/VA positive and HER-2/bishop negative.  Patient also had an ultrasound of the axilla which was concerning for an abnormal left axillary lymph node with cortical thickening.  She apparently had an FNA of the left axillary nodule which was positive for malignancy.  On 2017 patient underwent left modified radical mastectomy, right prophylactic mastectomy and immediate breast reconstruction.  She also underwent right prophylactic mastectomy.  We have had her on records suggest that patient did have multifocal disease with pT2 pN1 aM0.  The largest focus measured 3.5 cm.  2 of 11 lymph nodes were positive for metastatic disease some with extracapsular extension.  Notes that patient did receive Arimidex neoadjuvant  from 2017 to 2018 prior to her bilateral mastectomies.    Review of her note suggest that she developed cough which she attributed to anastrozole and patient was then placed on tamoxifen.  She had MammaPrint testing which returned with low risk for relapse.    Her postop course was also complicated by  left chest wall abscess which resulted in I&D and removal of the left tissue expander. She was referred for radiation treatment boards ultimately declined.    Patient was then placed on tamoxifen in April 2018 which she stopped after less than a month due to nausea and vomiting.  Patient in the interim also was diagnosed with chronic hepatitis C was seen by the hepatologist.  Tamoxifen was dose reduced to 10 mg daily with the goal to increase to 20 mg daily.      Patient has relocated to [facility].  She has transitioned her care to us now.  She is currently not on any antiestrogen therapy.     Her bilateral mastectomy specimen are available for review    · 1/29/2021 patient had bone density which showed osteoporosis  · Patient was seen by neurosurgery and had a bone scan done in March 2021 which showed subtle activity in the inferior L4 vertebral body appears to correlate with new area of sclerosis on CT of the abdomen and pelvis.  There is also subtle activity with possible new lesion at the posterior left sacrum.  These findings were concerning for metastatic disease.  PET CT scan was recommended to further evaluate.  · 4/8/2021 patient had a PET CT scan which showed evidence of disease in the neck chest abdomen and pelvis.  But she has a 1.1 cm mixed lytic/sclerotic hypermetabolic lesion within the left hemisacrum consistent with metastatic disease.  There is also accumulation within the inferior endplate of the L4 vertebral body thought to correspond to 1.2 centimeters sclerotic lesion consistent with metastatic disease.  There is a tiny hypermetabolic focus within the L3, T11.  Suspicious for also early metastatic disease.  · 4/26/2021 patient underwent CT-guided biopsy of sacral lesion, pathology was consistent with metastatic breast cancer ER and NE positive HER-2/bishop was negative.      Past Medical History:   Diagnosis Date   • Allergic    • Breast cancer (CMS/HCC) 2017    mets to lymph nodes; did not  do radiation   • Cancer of unknown origin (CMS/HCC)    • Compression fx, thoracic spine, open, initial encounter (CMS/HCC)    • Coronary artery disease    • Diabetes mellitus (CMS/HCC)    • Heart disease, unspecified    • Hepatitis C    • Hypertension    • Obesity (BMI 30-39.9) 2021   • Sleep apnea     no machine   • Type 2 diabetes mellitus (CMS/HCC) 2017       Past Surgical History:   Procedure Laterality Date   • BACK SURGERY      neck   • BREAST RECONSTRUCTION Bilateral    • CARDIAC ABLATION       atrial tachycardia x 5 ablations    • CARDIAC CATHETERIZATION     • CARDIAC SURGERY      stent placed in aorta   • CARDIAC SURGERY      6 surgeries as baby    • CERVICAL FUSION ANTERIOR WITH ARTIFICIAL DISCECTOMY IMPLANTATION N/A 2020    Procedure: C4 VERTEBRECTOMY AND ANTERIOR CERIVCAL DISCECTOMY WITH FUSION OF CERVICAL THREE THROUGH FIVE WITH REMOVAL OF HARDWARE C5-C6;  Surgeon: Mark Kaba MD;  Location: [facility] MAIN OR;  Service: Neurosurgery;  Laterality: N/A;   •  SECTION      x2   • COLONOSCOPY N/A 10/23/2020    Procedure: COLONOSCOPY WITH POLYPECTOMY X6;  Surgeon: Stanley Bourne MD;  Location: [facility] ENDOSCOPY;  Service: Gastroenterology;  Laterality: N/A;  POLYPS, INTERNAL HEMORRHOIDS   • ENDOSCOPY N/A 10/23/2020    Procedure: ESOPHAGOGASTRODUODENOSCOPY WITH BIOPSY X 1 AREA;  Surgeon: Stanley Bourne MD;  Location: [facility] ENDOSCOPY;  Service: Gastroenterology;  Laterality: N/A;  GASTRITIS, ESOPHAGITIS, HIATAL HERNIA   • KNEE SURGERY     • MASTECTOMY Bilateral    • NECK SURGERY     • PACEMAKER IMPLANTATION           Current Outpatient Medications:   •  Accu-Chek FastClix Lancets misc, For finger stick 4x/d, Disp: 100 each, Rfl: 12  •  Alcohol Swabs 70 % pads, Use tid, Disp: 300 each, Rfl: 2  •  amoxicillin-clavulanate (AUGMENTIN) 875-125 MG per tablet, Take 1 tablet by mouth Every 12 (Twelve) Hours for 10 days., Disp: 20 tablet, Rfl: 0  •  anastrozole  "(Arimidex) 1 MG tablet, Take 1 tablet by mouth Daily., Disp: 30 tablet, Rfl: 6  •  anastrozole (ARIMIDEX) 1 MG tablet, Take 1 tablet by mouth Daily., Disp: 30 tablet, Rfl: 6  •  aspirin 81 MG chewable tablet, Chew 1 tablet Daily. (Patient taking differently: Chew 81 mg Daily. Hold DOS), Disp: 30 tablet, Rfl: 1  •  Blood Glucose Monitoring Suppl (ACCU-CHEK TEODORO PLUS) w/Device kit, Use as directed   DX  E11.9, Disp: 1 kit, Rfl: 0  •  Calcium Carbonate-Vit D-Min (Calcium 600+D Plus Minerals) 600-400 MG-UNIT chewable tablet, Chew 600 mg 2 (Two) Times a Day., Disp: 60 each, Rfl: 6  •  chlorhexidine (PERIDEX) 0.12 % solution, Apply 15 mL to the mouth or throat 4 (Four) Times a Day for 5 days., Disp: 147.8 mL, Rfl: 0  •  Continuous Blood Gluc Sensor (FreeStyle Rajeev 14 Day Sensor) misc, 1 each Every 14 (Fourteen) Days., Disp: 6 each, Rfl: 1  •  cyclobenzaprine (FLEXERIL) 10 MG tablet, Take 1 tablet by mouth 3 (Three) Times a Day As Needed for Muscle Spasms., Disp: 90 tablet, Rfl: 0  •  glucose blood (Accu-Chek Teodoro Plus) test strip, To check blood sugar 4x/d, Disp: 150 each, Rfl: 12  •  insulin NPH-insulin regular (humuLIN 70/30,novoLIN 70/30) (70-30) 100 UNIT/ML injection, Inject 40 Units under the skin into the appropriate area as directed Every Morning., Disp: , Rfl:   •  insulin NPH-insulin regular (humuLIN 70/30,novoLIN 70/30) (70-30) 100 UNIT/ML injection, Inject 30 Units under the skin into the appropriate area as directed Every Evening., Disp: , Rfl:   •  insulin NPH-insulin regular (NovoLIN 70/30) (70-30) 100 UNIT/ML injection, Inject  under the skin into the appropriate area as directed., Disp: , Rfl:   •  Insulin Syringe 31G X 5/16\" 1 ML misc, 1 syringe 2 (Two) Times a Day., Disp: 100 each, Rfl: 1  •  lisinopril (PRINIVIL,ZESTRIL) 20 MG tablet, Take 20 mg by mouth Daily., Disp: , Rfl:   •  ondansetron (ZOFRAN) 8 MG tablet, Take 1 tablet by mouth 3 (Three) Times a Day As Needed for Nausea or Vomiting., Disp: 30 " tablet, Rfl: 5  •  oxyCODONE-acetaminophen (PERCOCET) 5-325 MG per tablet, Take 1 tablet by mouth Take As Directed. Take 1 tablet by mouth every 4-6 hours prn pain, Disp: 40 tablet, Rfl: 0  •  prochlorperazine (COMPAZINE) 10 MG tablet, Take 1 tablet by mouth Every 6 (Six) Hours As Needed for Nausea or Vomiting., Disp: 90 tablet, Rfl: 1  •  rosuvastatin (Crestor) 20 MG tablet, Take 1 tablet by mouth Every Night., Disp: 90 tablet, Rfl: 0    Allergies   Allergen Reactions   • Promethazine Mental Status Change   • Tape Other (See Comments)     .blisters         Family History   Problem Relation Age of Onset   • Lymphoma Mother    • Heart disease Mother    • Stroke Mother    • Cancer Mother    • Other Father    • Diabetes Sister    • Thyroid disease Sister    • No Known Problems Brother    • No Known Problems Brother    • Diabetes type I Half-Sister    • Thyroid cancer Half-Sister    • Cancer Maternal Aunt        Cancer-related family history includes Cancer in her maternal aunt and mother; Lymphoma in her mother; Thyroid cancer in her half-sister.    Social History     Tobacco Use   • Smoking status: Never Smoker   • Smokeless tobacco: Never Used   Vaping Use   • Vaping Use: Never used   Substance Use Topics   • Alcohol use: Yes     Alcohol/week: 1.0 standard drinks     Types: 1 Glasses of wine per week     Comment: rare   • Drug use: Not Currently       HPI, ROS and PFSH have been reviewed and confirmed on 6/7/2021.     SUBJECTIVE:      Patient is here today to review her pathology results.  Her spouse is with her today.    Patient has developed dental pain.  She is in the process of seeing her dentist.        REVIEW OF SYSTEMS:    Review of Systems   Constitutional: Negative for chills and fever.   HENT: Negative for ear pain, mouth sores, nosebleeds and sore throat.    Eyes: Negative for photophobia and visual disturbance.   Respiratory: Negative for wheezing and stridor.    Cardiovascular: Negative for chest pain  "and palpitations.   Gastrointestinal: Negative for abdominal pain, diarrhea, nausea and vomiting.   Endocrine: Negative for cold intolerance and heat intolerance.   Genitourinary: Negative for dysuria and hematuria.   Musculoskeletal: Positive for arthralgias. Negative for joint swelling and neck stiffness.        Neck pain   Skin: Negative for color change and rash.   Neurological: Negative for seizures and syncope.   Hematological: Negative for adenopathy.   Psychiatric/Behavioral: Negative for agitation, confusion and hallucinations.     OBJECTIVE:    Vitals:    06/07/21 1030   BP: 159/93   Pulse: 80   Resp: 18   Temp: 97.1 °F (36.2 °C)   TempSrc: Temporal   Weight: 72.6 kg (160 lb)   Height: 154.9 cm (61\")   PainSc:   6   PainLoc: Comment: back, hip, neck, head, mouth, axilla     Body mass index is 30.23 kg/m².    ECOG  (1) Restricted in physically strenuous activity, ambulatory and able to do work of light nature    Physical Exam  Vitals and nursing note reviewed.   Constitutional:       General: She is not in acute distress.     Appearance: She is not diaphoretic.   HENT:      Head: Normocephalic and atraumatic.   Eyes:      General: No scleral icterus.        Right eye: No discharge.         Left eye: No discharge.      Conjunctiva/sclera: Conjunctivae normal.   Neck:      Thyroid: No thyromegaly.   Cardiovascular:      Rate and Rhythm: Normal rate and regular rhythm.      Heart sounds: Normal heart sounds. No friction rub. No gallop.    Pulmonary:      Effort: Pulmonary effort is normal. No respiratory distress.      Breath sounds: No stridor. No wheezing.   Abdominal:      General: Bowel sounds are normal.      Palpations: Abdomen is soft. There is no mass.      Tenderness: There is no abdominal tenderness. There is no guarding or rebound.   Musculoskeletal:         General: No tenderness. Normal range of motion.      Cervical back: Normal range of motion and neck supple.      Comments: Neck pain.  Patient " has a neck collar.   Lymphadenopathy:      Cervical: No cervical adenopathy.   Skin:     General: Skin is warm.      Findings: No erythema or rash.   Neurological:      Mental Status: She is alert and oriented to person, place, and time.      Motor: No abnormal muscle tone.   Psychiatric:         Behavior: Behavior normal.     I have reexamined the patient and the results are consistent with the previously documented exam. Мария Nunez MD     RECENT LABS    WBC   Date Value Ref Range Status   06/07/2021 2.26 (L) 3.40 - 10.80 10*3/mm3 Final     RBC   Date Value Ref Range Status   06/07/2021 4.02 3.77 - 5.28 10*6/mm3 Final     Hemoglobin   Date Value Ref Range Status   06/07/2021 11.5 (L) 12.0 - 15.9 g/dL Final     Hematocrit   Date Value Ref Range Status   06/07/2021 35.0 34.0 - 46.6 % Final     MCV   Date Value Ref Range Status   06/07/2021 87.1 79.0 - 97.0 fL Final     MCH   Date Value Ref Range Status   06/07/2021 28.6 26.6 - 33.0 pg Final     MCHC   Date Value Ref Range Status   06/07/2021 32.9 31.5 - 35.7 g/dL Final     RDW   Date Value Ref Range Status   06/07/2021 12.7 12.3 - 15.4 % Final     RDW-SD   Date Value Ref Range Status   06/07/2021 36.9 (L) 37.0 - 54.0 fl Final     MPV   Date Value Ref Range Status   06/07/2021 10.7 6.0 - 12.0 fL Final     Platelets   Date Value Ref Range Status   06/07/2021 51 (L) 140 - 450 10*3/mm3 Final     Neutrophil %   Date Value Ref Range Status   06/07/2021 51.8 42.7 - 76.0 % Final     Lymphocyte %   Date Value Ref Range Status   06/07/2021 45.1 19.6 - 45.3 % Final     Monocyte %   Date Value Ref Range Status   06/07/2021 2.7 (L) 5.0 - 12.0 % Final     Eosinophil %   Date Value Ref Range Status   06/07/2021 0.4 0.3 - 6.2 % Final     Basophil %   Date Value Ref Range Status   06/07/2021 0.0 0.0 - 1.5 % Final     Immature Grans %   Date Value Ref Range Status   07/20/2020 0.7 (H) 0.0 - 0.5 % Final     Neutrophils, Absolute   Date Value Ref Range Status   06/07/2021  1.17 (L) 1.70 - 7.00 10*3/mm3 Final     Lymphocytes, Absolute   Date Value Ref Range Status   06/07/2021 1.02 0.70 - 3.10 10*3/mm3 Final     Monocytes, Absolute   Date Value Ref Range Status   06/07/2021 0.06 (L) 0.10 - 0.90 10*3/mm3 Final     Eosinophils, Absolute   Date Value Ref Range Status   06/07/2021 0.01 0.00 - 0.40 10*3/mm3 Final     Basophils, Absolute   Date Value Ref Range Status   06/07/2021 0.00 0.00 - 0.20 10*3/mm3 Final     Immature Grans, Absolute   Date Value Ref Range Status   07/20/2020 0.05 0.00 - 0.05 10*3/mm3 Final     nRBC   Date Value Ref Range Status   04/26/2021 0.0 0.0 - 0.2 /100 WBC Final       Lab Results   Component Value Date    GLUCOSE 73 05/19/2021    BUN 16 05/19/2021    CREATININE 0.85 05/19/2021    EGFRIFNONA 69 05/19/2021    EGFRIFAFRI >60 10/12/2018    BCR 18.8 05/19/2021    K 4.0 05/19/2021    CO2 20.0 (L) 05/19/2021    CALCIUM 10.6 (H) 05/19/2021    PROTENTOTREF 7.4 12/14/2020    ALBUMIN 4.80 05/19/2021    LABIL2 0.9 12/14/2020    AST 33 (H) 05/19/2021    ALT 27 05/19/2021         Assessment/Plan     Malignant neoplasm of female breast, unspecified estrogen receptor status, unspecified laterality, unspecified site of breast (CMS/HCC)  - CBC & Differential  - Comprehensive Metabolic Panel  - Comprehensive Metabolic Panel      ASSESSMENT:    · Metastatic breast cancer presenting as 1.1 cm mixed lytic/sclerotic hypermetabolic lesion in the left hemisacrum suspicious for metastatic disease 1.2 cm sclerotic lesion on L4, small L3 lesion and T11 lesion.  Tumor is ER positive, AR positive and HER-2/bishop negative.  Patient is currently on aromatase inhibitor, I have therefore recommended addition of CDK 4 inhibitor with Ibrance.  She will also benefit from biphosphonate therapy to prevent skeletal fractures.  Will discontinue Prolia and begin Xgeva as well.  Reviewed the side effects of Ibrance in detail with patient to include but not limited to fatigue, hepatic function  abnormalities, interstitial lung disease, pancytopenia.  She is currently on combination of Ibrance, Arimidex and Xgeva to be started once her dental procedure has been completed  · Tooth ache probable secondary to infection.  Patient has appointment with dentist coming up.  She is currently on antibiotics.  · Pancytopenia secondary to Ibrance: Continue to monitor her counts very closely  · ypT2 N1 aM0 status post left modified radical mastectomy with lymph node dissection and right prophylactic mastectomy in 2017 performed at [facility].  ER positive, TX positive and HER-2/bishop negative.  Status post bilateral chest wall reconstruction.  According to patient, she tolerated Arimidex very well except that she also had osteoporosis therefore Arimidex was discontinued at that time  · Intolerance of tamoxifen in the past  · Osteoporosis: On Prolia: We will transition to Xgeva since she has bone metastases  · Status post neoadjuvant Arimidex prior to bilateral mastectomies  · Thrombocytopenia: Work-up was - December 2020  · Neck pain status post cervical spine surgery: Patient has a soft neck collar  · Complex cardiac medical history including tetralogy of Fallot, coarctation of aorta, VSD status post repair.  Status post stent placement for coarctation of aorta  · Personal history and strong family history of breast cancer in multiple in the relatives on paternal side of the family including 4 paternal aunts in their 30s and 40s and 2 maternal aunts at age of 20s and 50s.  There is concern for possible hereditary breast cancer syndrome.  Patient may be  interested in pursuing cancer genetics for her management.  · Assessment has been reviewed and updated        Discussion    Patient has metastatic breast cancer estrogen and progesterone dependent and HER-2/bishop negative.  She is currently on Arimidex.  We will add Ibrance.  We will also discontinue Prolia and begin Xgeva to help reduce skeletal  events.           Plans:     · Continue antibiotics for dental infection  · Follow-up with dentist as soon as possible  · Hold Xgeva until all dental procedures have been completed and she has had a follow-up visit   · Continue Arimidex, Ibrance  · Reviewed path report  · Reviewed CMP, CBC, CEA, CA 15-3, CA 27.29.  · Discontinued Ultram 100 mg p.o. every 12 as needed for pain due to heartburn and begin Percocet 5 mg p.o. every 4-6 hours as needed for pain number 40 tablets  · Reviewed work-up for thrombocytopenia which was negative  · Reviewed her bone density which showed osteoporosis  · Hold Xgeva 120 mg subcu monthly Xgeva has not been started yet  · Continue Arimidex 1 mg p.o. daily  · Continue Os-Chetan D 600 mg tablets twice a day  · Thrombocytopenia work-up reviewed with patient and negative  · She cannot tolerate tamoxifen.  Explained to patient that we can still treat her with Arimidex    · All questions answered  · Follow-up in 4 weeks or earlier as needed for problems  · Information on Ibrance was given to patient and her spouse who is present today           I have reviewed labs results, imaging, vitals, and medications with the patient today.  Will follow up in 4 weeks with me.        Patient verbalized understanding and is in agreement of the above plan.               I spent 40 total minutes, face-to-face, caring for Gladys today.  90% of this time involved counseling and/or coordination of care as documented within this note.

## 2021-06-05 ENCOUNTER — HOSPITAL ENCOUNTER (EMERGENCY)
Facility: HOSPITAL | Age: 61
Discharge: HOME | End: 2021-06-05
Admitting: EMERGENCY MEDICINE

## 2021-06-05 VITALS
WEIGHT: 162 LBS | DIASTOLIC BLOOD PRESSURE: 73 MMHG | HEIGHT: 61 IN | RESPIRATION RATE: 18 BRPM | TEMPERATURE: 98.4 F | SYSTOLIC BLOOD PRESSURE: 153 MMHG | HEART RATE: 82 BPM | OXYGEN SATURATION: 98 % | BODY MASS INDEX: 30.58 KG/M2

## 2021-06-05 DIAGNOSIS — K08.89 PAIN, DENTAL: Primary | ICD-10-CM

## 2021-06-05 PROCEDURE — 99283 EMERGENCY DEPT VISIT LOW MDM: CPT

## 2021-06-05 RX ORDER — CHLORHEXIDINE GLUCONATE 0.12 MG/ML
15 RINSE ORAL 4 TIMES DAILY
Qty: 147.8 ML | Refills: 0 | Status: SHIPPED | OUTPATIENT
Start: 2021-06-05 | End: 2021-06-10

## 2021-06-05 RX ORDER — AMOXICILLIN AND CLAVULANATE POTASSIUM 875; 125 MG/1; MG/1
1 TABLET, FILM COATED ORAL EVERY 12 HOURS
Qty: 20 TABLET | Refills: 0 | Status: SHIPPED | OUTPATIENT
Start: 2021-06-05 | End: 2021-06-14

## 2021-06-05 RX ADMIN — LIDOCAINE HYDROCHLORIDE: 20 SOLUTION ORAL; TOPICAL at 16:02

## 2021-06-05 NOTE — ED PROVIDER NOTES
Subjective   60-year-old  female presents to the emergency room with complaint of left upper and lower jaw pain that started last night before bed.  Patient denies pain when talking but states that the pain is worse when she chews on that side of her jaw.  Patient denies fever, cough, congestion, nausea, vomiting or diarrhea.  Patient denies fever or malaise or any flulike symptoms.  Onset: Last night  Location: Left upper and lower jaw  Duration: Less than 24 hours  Character: Achy and swelling  Aggravating/Alleviating Factors: Eating and drinking/none  Radiation: None  Severity: Moderate            Review of Systems   Constitutional: Negative for activity change, appetite change, chills, diaphoresis, fatigue and fever.   HENT: Positive for dental problem. Negative for congestion, drooling, ear discharge, ear pain, facial swelling, hearing loss, mouth sores, nosebleeds, postnasal drip, rhinorrhea, sinus pressure, sinus pain, sneezing, sore throat, tinnitus, trouble swallowing and voice change.    Respiratory: Negative for cough, chest tightness and shortness of breath.    Cardiovascular: Negative for chest pain and palpitations.   Gastrointestinal: Negative for abdominal pain.   Genitourinary: Negative.    Musculoskeletal: Negative.    Skin: Negative.    Neurological: Negative.    Hematological: Negative.        Past Medical History:   Diagnosis Date   • Allergic    • Breast cancer (CMS/HCC) 2017    mets to lymph nodes; did not do radiation   • Cancer of unknown origin (CMS/HCC)    • Compression fx, thoracic spine, open, initial encounter (CMS/[facility])    • Coronary artery disease    • Diabetes mellitus (CMS/[facility])    • Heart disease, unspecified    • Hepatitis C    • Hypertension    • Obesity (BMI 30-39.9) 2/5/2021   • Sleep apnea     no machine   • Type 2 diabetes mellitus (CMS/HCC) 11/2017       Allergies   Allergen Reactions   • Promethazine Mental Status Change   • Tape Other (See Comments)     .blisters          Past Surgical History:   Procedure Laterality Date   • BACK SURGERY      neck   • BREAST RECONSTRUCTION Bilateral    • CARDIAC ABLATION       atrial tachycardia x 5 ablations    • CARDIAC CATHETERIZATION     • CARDIAC SURGERY      stent placed in aorta   • CARDIAC SURGERY      6 surgeries as baby    • CERVICAL FUSION ANTERIOR WITH ARTIFICIAL DISCECTOMY IMPLANTATION N/A 2020    Procedure: C4 VERTEBRECTOMY AND ANTERIOR CERIVCAL DISCECTOMY WITH FUSION OF CERVICAL THREE THROUGH FIVE WITH REMOVAL OF HARDWARE C5-C6;  Surgeon: Mark Kaba MD;  Location: [facility] MAIN OR;  Service: Neurosurgery;  Laterality: N/A;   •  SECTION      x2   • COLONOSCOPY N/A 10/23/2020    Procedure: COLONOSCOPY WITH POLYPECTOMY X6;  Surgeon: Stanley Bourne MD;  Location: [facility] ENDOSCOPY;  Service: Gastroenterology;  Laterality: N/A;  POLYPS, INTERNAL HEMORRHOIDS   • ENDOSCOPY N/A 10/23/2020    Procedure: ESOPHAGOGASTRODUODENOSCOPY WITH BIOPSY X 1 AREA;  Surgeon: Stanley Bourne MD;  Location: [facility] ENDOSCOPY;  Service: Gastroenterology;  Laterality: N/A;  GASTRITIS, ESOPHAGITIS, HIATAL HERNIA   • KNEE SURGERY     • MASTECTOMY Bilateral    • NECK SURGERY     • PACEMAKER IMPLANTATION         Family History   Problem Relation Age of Onset   • Lymphoma Mother    • Heart disease Mother    • Stroke Mother    • Cancer Mother    • Other Father    • Diabetes Sister    • Thyroid disease Sister    • No Known Problems Brother    • No Known Problems Brother    • Diabetes type I Half-Sister    • Thyroid cancer Half-Sister    • Cancer Maternal Aunt        Social History     Socioeconomic History   • Marital status:      Spouse name: Not on file   • Number of children: 2   • Years of education: Not on file   • Highest education level: Not on file   Tobacco Use   • Smoking status: Never Smoker   • Smokeless tobacco: Never Used   Vaping Use   • Vaping Use: Never used   Substance and Sexual Activity   •  Alcohol use: Yes     Alcohol/week: 1.0 standard drinks     Types: 1 Glasses of wine per week     Comment: rare   • Drug use: Not Currently   • Sexual activity: Defer           Objective   Physical Exam  Constitutional:       General: She is not in acute distress.     Appearance: Normal appearance. She is not ill-appearing, toxic-appearing or diaphoretic.   HENT:      Head: Normocephalic and atraumatic.      Jaw: There is normal jaw occlusion. Tenderness present. No trismus, swelling, pain on movement or malocclusion.      Salivary Glands: Right salivary gland is not diffusely enlarged or tender. Left salivary gland is not diffusely enlarged or tender.        Mouth/Throat:      Mouth: Mucous membranes are moist.      Pharynx: Oropharynx is clear. No oropharyngeal exudate or posterior oropharyngeal erythema.        Comments: Mild surrounding erythema around tooth #13 and #18.  No abscess noted.  Eyes:      Extraocular Movements: Extraocular movements intact.      Conjunctiva/sclera: Conjunctivae normal.      Pupils: Pupils are equal, round, and reactive to light.   Cardiovascular:      Rate and Rhythm: Normal rate and regular rhythm.      Pulses: Normal pulses.   Pulmonary:      Effort: Pulmonary effort is normal.      Breath sounds: Normal breath sounds.   Abdominal:      General: Bowel sounds are normal.      Palpations: Abdomen is soft.   Musculoskeletal:         General: Normal range of motion.      Cervical back: Normal range of motion and neck supple.   Skin:     General: Skin is warm and dry.      Capillary Refill: Capillary refill takes less than 2 seconds.   Neurological:      Mental Status: She is alert and oriented to person, place, and time.   Psychiatric:         Mood and Affect: Mood normal.         Behavior: Behavior normal.         Thought Content: Thought content normal.         Judgment: Judgment normal.         Procedures           ED Course      Dental balls ordered and applied prior to d/c.   Prescribed antibiotic regimen for dental pain/infection - Augmentin.  Prescribed Chlorhexidine oral rinse.  Dental clinic resource sheet given at time of discharge with discharge paperwork.  Encouraged to establish care with dental professional.  Encourage to see PCP next week, if not better.     Labs Reviewed - No data to display     Medications   dental ball oral suspension without diphenhydrAMINE ( Swish & Spit Given 6/5/21 1602)     No radiology results for the last day                                MDM  Number of Diagnoses or Management Options      Final diagnoses:   Pain, dental       ED Disposition  ED Disposition     ED Disposition Condition Comment    Discharge Stable           Kourtney Coffman, APRISRA  [address]  [address]  [facility] [place] [address] [phone]    Schedule an appointment as soon as possible for a visit in 1 week  As needed, If symptoms worsen         Medication List      New Prescriptions    amoxicillin-clavulanate 875-125 MG per tablet  Commonly known as: AUGMENTIN  Take 1 tablet by mouth Every 12 (Twelve) Hours for 10 days.     chlorhexidine 0.12 % solution  Commonly known as: PERIDEX  Apply 15 mL to the mouth or throat 4 (Four) Times a Day for 5 days.        Changed    aspirin 81 MG chewable tablet  Chew 1 tablet Daily.  What changed: additional instructions        Stop    Palbociclib 125 MG tablet           Where to Get Your Medications      These medications were sent to [id number] - [place], [place] - [address]  - [phone]  - [phone] FX  [address]  [place] [address]    Phone: [phone]   · amoxicillin-clavulanate 875-125 MG per tablet  · chlorhexidine 0.12 % solution          Yomaira Urena APRN  06/05/21 1611

## 2021-06-05 NOTE — ED NOTES
Pt reports hx of metastatic breast cancer.  Pt reports taking tramadol 50 mg today and used orajel with no relief. Pt reports swelling started in her mouth last night.      Pratik Earl, RN  06/05/21 8494

## 2021-06-05 NOTE — DISCHARGE INSTRUCTIONS
Rest and take Chlorhexidine rinse and antibiotic prescription, as directed.  Please establish care with a dentist, asap; refer to the provided dental clinic option reference page.

## 2021-06-07 ENCOUNTER — LAB (OUTPATIENT)
Dept: LAB | Facility: HOSPITAL | Age: 61
End: 2021-06-07

## 2021-06-07 ENCOUNTER — OFFICE VISIT (OUTPATIENT)
Dept: ONCOLOGY | Facility: CLINIC | Age: 61
End: 2021-06-07

## 2021-06-07 VITALS
SYSTOLIC BLOOD PRESSURE: 159 MMHG | TEMPERATURE: 97.1 F | DIASTOLIC BLOOD PRESSURE: 93 MMHG | HEIGHT: 61 IN | HEART RATE: 80 BPM | RESPIRATION RATE: 18 BRPM | BODY MASS INDEX: 30.21 KG/M2 | WEIGHT: 160 LBS

## 2021-06-07 DIAGNOSIS — C50.919 MALIGNANT NEOPLASM OF FEMALE BREAST, UNSPECIFIED ESTROGEN RECEPTOR STATUS, UNSPECIFIED LATERALITY, UNSPECIFIED SITE OF BREAST (HCC): Primary | ICD-10-CM

## 2021-06-07 DIAGNOSIS — C50.919 MALIGNANT NEOPLASM OF FEMALE BREAST, UNSPECIFIED ESTROGEN RECEPTOR STATUS, UNSPECIFIED LATERALITY, UNSPECIFIED SITE OF BREAST (HCC): ICD-10-CM

## 2021-06-07 LAB
ALBUMIN SERPL-MCNC: 4 G/DL (ref 3.5–5.2)
ALBUMIN/GLOB SERPL: 1.4 G/DL
ALP SERPL-CCNC: 52 U/L (ref 39–117)
ALT SERPL W P-5'-P-CCNC: 15 U/L (ref 1–33)
ANION GAP SERPL CALCULATED.3IONS-SCNC: 13 MMOL/L (ref 5–15)
AST SERPL-CCNC: 19 U/L (ref 1–32)
BASOPHILS # BLD AUTO: 0 10*3/MM3 (ref 0–0.2)
BASOPHILS NFR BLD AUTO: 0 % (ref 0–1.5)
BILIRUB SERPL-MCNC: 0.5 MG/DL (ref 0–1.2)
BUN SERPL-MCNC: 15 MG/DL (ref 6–20)
BUN/CREAT SERPL: 17.4 (ref 7–25)
CALCIUM SPEC-SCNC: 9.3 MG/DL (ref 8.6–10.5)
CHLORIDE SERPL-SCNC: 103 MMOL/L (ref 98–107)
CO2 SERPL-SCNC: 23 MMOL/L (ref 22–29)
CREAT SERPL-MCNC: 0.86 MG/DL (ref 0.57–1)
DEPRECATED RDW RBC AUTO: 36.9 FL (ref 37–54)
EOSINOPHIL # BLD AUTO: 0.01 10*3/MM3 (ref 0–0.4)
EOSINOPHIL NFR BLD AUTO: 0.4 % (ref 0.3–6.2)
ERYTHROCYTE [DISTWIDTH] IN BLOOD BY AUTOMATED COUNT: 12.7 % (ref 12.3–15.4)
GFR SERPL CREATININE-BSD FRML MDRD: 68 ML/MIN/1.73
GLOBULIN UR ELPH-MCNC: 2.9 GM/DL
GLUCOSE SERPL-MCNC: 226 MG/DL (ref 65–99)
HCT VFR BLD AUTO: 35 % (ref 34–46.6)
HGB BLD-MCNC: 11.5 G/DL (ref 12–15.9)
LYMPHOCYTES # BLD AUTO: 1.02 10*3/MM3 (ref 0.7–3.1)
LYMPHOCYTES NFR BLD AUTO: 45.1 % (ref 19.6–45.3)
MCH RBC QN AUTO: 28.6 PG (ref 26.6–33)
MCHC RBC AUTO-ENTMCNC: 32.9 G/DL (ref 31.5–35.7)
MCV RBC AUTO: 87.1 FL (ref 79–97)
MONOCYTES # BLD AUTO: 0.06 10*3/MM3 (ref 0.1–0.9)
MONOCYTES NFR BLD AUTO: 2.7 % (ref 5–12)
NEUTROPHILS NFR BLD AUTO: 1.17 10*3/MM3 (ref 1.7–7)
NEUTROPHILS NFR BLD AUTO: 51.8 % (ref 42.7–76)
PLATELET # BLD AUTO: 51 10*3/MM3 (ref 140–450)
PMV BLD AUTO: 10.7 FL (ref 6–12)
POTASSIUM SERPL-SCNC: 4.6 MMOL/L (ref 3.5–5.2)
PROT SERPL-MCNC: 6.9 G/DL (ref 6–8.5)
RBC # BLD AUTO: 4.02 10*6/MM3 (ref 3.77–5.28)
SODIUM SERPL-SCNC: 139 MMOL/L (ref 136–145)
WBC # BLD AUTO: 2.26 10*3/MM3 (ref 3.4–10.8)

## 2021-06-07 PROCEDURE — 80053 COMPREHEN METABOLIC PANEL: CPT | Performed by: INTERNAL MEDICINE

## 2021-06-07 PROCEDURE — 99215 OFFICE O/P EST HI 40 MIN: CPT | Performed by: INTERNAL MEDICINE

## 2021-06-07 PROCEDURE — 85025 COMPLETE CBC W/AUTO DIFF WBC: CPT

## 2021-06-07 PROCEDURE — 36415 COLL VENOUS BLD VENIPUNCTURE: CPT | Performed by: INTERNAL MEDICINE

## 2021-06-08 ENCOUNTER — MEDICATION THERAPY MANAGEMENT (OUTPATIENT)
Dept: ONCOLOGY | Facility: HOSPITAL | Age: 61
End: 2021-06-08

## 2021-06-08 NOTE — PROGRESS NOTES
[facility] Lab review  Results for LUCIEN SARGENT (MRN [id number]) as of 6/8/2021 10:21    Component   Ref Range & Units 1 d ago   WBC   3.40 - 10.80 10*3/mm3 2.26Low     RBC   3.77 - 5.28 10*6/mm3 4.02    Hemoglobin   12.0 - 15.9 g/dL 11.5Low     Hematocrit   34.0 - 46.6 % 35.0    RDW-SD   37.0 - 54.0 fl 36.9Low     Platelets   140 - 450 10*3/mm3 51Low     Monocyte %   5.0 - 12.0 % 2.7Low     Neutrophils, Absolute   1.70 - 7.00 10*3/mm3 1.17Low     Lymphocytes, Absolute   0.70 - 3.10 10*3/mm3 1.02    Monocytes, Absolute   0.10 - 0.90 10*3/mm3 0.06Low

## 2021-06-14 ENCOUNTER — LAB (OUTPATIENT)
Dept: FAMILY MEDICINE CLINIC | Facility: CLINIC | Age: 61
End: 2021-06-14

## 2021-06-14 ENCOUNTER — OFFICE VISIT (OUTPATIENT)
Dept: FAMILY MEDICINE CLINIC | Facility: CLINIC | Age: 61
End: 2021-06-14

## 2021-06-14 VITALS
SYSTOLIC BLOOD PRESSURE: 150 MMHG | TEMPERATURE: 98.2 F | HEART RATE: 88 BPM | BODY MASS INDEX: 30.21 KG/M2 | DIASTOLIC BLOOD PRESSURE: 86 MMHG | WEIGHT: 160 LBS | HEIGHT: 61 IN | OXYGEN SATURATION: 98 %

## 2021-06-14 DIAGNOSIS — Z79.4 TYPE 2 DIABETES MELLITUS WITH HYPERGLYCEMIA, WITH LONG-TERM CURRENT USE OF INSULIN (HCC): Primary | ICD-10-CM

## 2021-06-14 DIAGNOSIS — E11.65 TYPE 2 DIABETES MELLITUS WITH HYPERGLYCEMIA, WITH LONG-TERM CURRENT USE OF INSULIN (HCC): Primary | ICD-10-CM

## 2021-06-14 DIAGNOSIS — D69.6 THROMBOCYTOPENIA (HCC): ICD-10-CM

## 2021-06-14 DIAGNOSIS — I47.1 SUPRAVENTRICULAR TACHYCARDIA (HCC): ICD-10-CM

## 2021-06-14 DIAGNOSIS — M54.12 CERVICAL MYELOPATHY WITH CERVICAL RADICULOPATHY (HCC): ICD-10-CM

## 2021-06-14 DIAGNOSIS — E11.65 TYPE 2 DIABETES MELLITUS WITH HYPERGLYCEMIA, WITH LONG-TERM CURRENT USE OF INSULIN (HCC): ICD-10-CM

## 2021-06-14 DIAGNOSIS — I50.20 SYSTOLIC CONGESTIVE HEART FAILURE, UNSPECIFIED HF CHRONICITY (HCC): ICD-10-CM

## 2021-06-14 DIAGNOSIS — Z79.4 TYPE 2 DIABETES MELLITUS WITH HYPERGLYCEMIA, WITH LONG-TERM CURRENT USE OF INSULIN (HCC): ICD-10-CM

## 2021-06-14 DIAGNOSIS — E11.9 CONTROLLED TYPE 2 DIABETES MELLITUS WITHOUT COMPLICATION, WITH LONG-TERM CURRENT USE OF INSULIN (HCC): Chronic | ICD-10-CM

## 2021-06-14 DIAGNOSIS — Z79.4 CONTROLLED TYPE 2 DIABETES MELLITUS WITHOUT COMPLICATION, WITH LONG-TERM CURRENT USE OF INSULIN (HCC): Chronic | ICD-10-CM

## 2021-06-14 DIAGNOSIS — G95.9 CERVICAL MYELOPATHY WITH CERVICAL RADICULOPATHY (HCC): ICD-10-CM

## 2021-06-14 DIAGNOSIS — I10 ESSENTIAL HYPERTENSION: Chronic | ICD-10-CM

## 2021-06-14 PROBLEM — I47.10 SUPRAVENTRICULAR TACHYCARDIA: Status: ACTIVE | Noted: 2021-06-14

## 2021-06-14 PROBLEM — K76.6 PORTAL HYPERTENSION: Status: RESOLVED | Noted: 2020-10-22 | Resolved: 2021-06-14

## 2021-06-14 PROBLEM — M54.9 BACK PAIN: Chronic | Status: RESOLVED | Noted: 2021-02-05 | Resolved: 2021-06-14

## 2021-06-14 PROBLEM — N20.0 CALCULUS OF KIDNEY: Status: RESOLVED | Noted: 2021-04-14 | Resolved: 2021-06-14

## 2021-06-14 PROBLEM — M54.50 ACUTE RIGHT-SIDED LOW BACK PAIN WITHOUT SCIATICA: Status: RESOLVED | Noted: 2021-02-26 | Resolved: 2021-06-14

## 2021-06-14 PROBLEM — Z85.3 HX OF BREAST CANCER: Status: RESOLVED | Noted: 2021-02-26 | Resolved: 2021-06-14

## 2021-06-14 PROBLEM — Z12.11 SCREENING FOR COLON CANCER: Status: RESOLVED | Noted: 2020-10-22 | Resolved: 2021-06-14

## 2021-06-14 PROBLEM — R07.9 CHEST PAIN: Status: RESOLVED | Noted: 2019-12-22 | Resolved: 2021-06-14

## 2021-06-14 LAB
ALBUMIN UR-MCNC: <1.2 MG/DL
CREAT UR-MCNC: 95.2 MG/DL
HBA1C MFR BLD: 9 % (ref 3.5–5.6)
MICROALBUMIN/CREAT UR: NORMAL MG/G{CREAT}

## 2021-06-14 PROCEDURE — 82570 ASSAY OF URINE CREATININE: CPT | Performed by: NURSE PRACTITIONER

## 2021-06-14 PROCEDURE — 83036 HEMOGLOBIN GLYCOSYLATED A1C: CPT | Performed by: NURSE PRACTITIONER

## 2021-06-14 PROCEDURE — 36415 COLL VENOUS BLD VENIPUNCTURE: CPT

## 2021-06-14 PROCEDURE — 82043 UR ALBUMIN QUANTITATIVE: CPT | Performed by: NURSE PRACTITIONER

## 2021-06-14 PROCEDURE — 99214 OFFICE O/P EST MOD 30 MIN: CPT | Performed by: NURSE PRACTITIONER

## 2021-06-14 NOTE — ASSESSMENT & PLAN NOTE
Diabetes is worsening.   Discussed ways to avoid symptomatic hypoglycemia.  Discussed sick day management.  Discussed foot care.  Reminded to get yearly retinal exam.  Endocrinology clinic referral.  Diabetes will be reassessed in 3 months.  Will repeat labs today. May need to follow up with Dr. Rey soon. Not taking insulin as prescribed.

## 2021-06-14 NOTE — PROGRESS NOTES
"Chief Complaint  bumps on head (sore and will scab over and go away) and 3 month f/u (diabetes)    Subjective          Gladys Garrison presents to [facility]  Pt is coming in today for follow up on DM. Taking humulin 70/30 suppose to be 40 units qam and 30 units qpm, but only takes the AM dose on days she works as she states she will drop too low if takes the evening dose. If off work, she will monitor how much she takes. She says she has had incidents at work where ems was called due to hypgylcemia.   She saw Dr. Rey once and then missed her last appt.   Last A1C was 8.7 in Feb.   She also has been under a lot of stress lately. She has metastatic breast cancer. In constant pain. Sees Dr. Nunez  Getting ready to follow up with cardiology as well. BP is high today. Feels it is stress related. Will monitor.          Objective     Vital Signs:   /86 (BP Location: Right arm, Patient Position: Sitting, Cuff Size: Adult)   Pulse 88   Temp 98.2 °F (36.8 °C) (Oral)   Ht 154.9 cm (61\")   Wt 72.6 kg (160 lb)   SpO2 98%   BMI 30.23 kg/m²       BP Readings from Last 3 Encounters:   06/14/21 150/86   06/07/21 159/93   06/05/21 153/73       Wt Readings from Last 3 Encounters:   06/14/21 72.6 kg (160 lb)   06/07/21 72.6 kg (160 lb)   06/05/21 73.5 kg (162 lb)       Physical Exam  Constitutional:       Appearance: She is well-developed.   Eyes:      Pupils: Pupils are equal, round, and reactive to light.   Cardiovascular:      Rate and Rhythm: Normal rate and regular rhythm.   Pulmonary:      Effort: Pulmonary effort is normal.      Breath sounds: Normal breath sounds.   Neurological:      Mental Status: She is alert and oriented to person, place, and time.          Result Review :{Labs  Result Review  Imaging  Med Tab  Media :23}   The following data was reviewed by: CIRO Solorzano on 06/14/2021:  CMP    CMP 4/14/21 5/19/21 6/7/21   Glucose 50 (A) 73 226 (A)   BUN " 13 16 15   Creatinine 0.74 0.85 0.86   eGFR Non African Am 81 69 68   Sodium 141 137 139   Potassium 4.1 4.0 4.6   Chloride 106 102 103   Calcium 9.7 10.6 (A) 9.3   Albumin 4.70 4.80 4.00   Total Bilirubin 0.4 0.4 0.5   Alkaline Phosphatase 81 75 52   AST (SGOT) 21 33 (A) 19   ALT (SGPT) 23 27 15   (A) Abnormal value       Comments are available for some flowsheets but are not being displayed.           A1C Last 3 Results    HGBA1C Last 3 Results 7/20/20 9/19/20 2/5/21   Hemoglobin A1C 7.8 (A) 7.1 (A) 8.7 (A)   (A) Abnormal value                        Assessment and Plan      Diagnoses and all orders for this visit:    1. Type 2 diabetes mellitus with hyperglycemia, with long-term current use of insulin (CMS/HCC) (Primary)  -     Hemoglobin A1c; Future  -     Microalbumin / Creatinine Urine Ratio - Urine, Clean Catch    2. Essential hypertension  Assessment & Plan:  Hypertension is unchanged.  Continue current treatment regimen.  Blood pressure will be reassessed at the next regular appointment Repeat /88. Not interested in adjusting meds at this time. Will monitor and has upcoming appt with cardiology.       3. Controlled type 2 diabetes mellitus without complication, with long-term current use of insulin (CMS/HCC)  Assessment & Plan:  Diabetes is worsening.   Discussed ways to avoid symptomatic hypoglycemia.  Discussed sick day management.  Discussed foot care.  Reminded to get yearly retinal exam.  Endocrinology clinic referral.  Diabetes will be reassessed in 3 months.  Will repeat labs today. May need to follow up with Dr. Rey soon. Not taking insulin as prescribed.       4. Supraventricular tachycardia (CMS/HCC)  Comments:  unchanged. sees cards     5. Thrombocytopenia (CMS/HCC)  Comments:  sees Dr. Nunez.     6. Cervical myelopathy with cervical radiculopathy (CMS/HCC)  Assessment & Plan:  Unchanged. No new concerns       7. Systolic congestive heart failure, unspecified HF chronicity  (CMS/[facility])  Assessment & Plan:  Stable. Will see cardiology soon       Check labs  Monitor BP and keep diary  Follow up with cardiology soon   Follow up with endo if needed       Follow Up   No follow-ups on file.  Patient was given instructions and counseling regarding her condition or for health maintenance advice. Please see specific information pulled into the AVS if appropriate.

## 2021-06-14 NOTE — ASSESSMENT & PLAN NOTE
Hypertension is unchanged.  Continue current treatment regimen.  Blood pressure will be reassessed at the next regular appointment Repeat /88. Not interested in adjusting meds at this time. Will monitor and has upcoming appt with cardiology.

## 2021-06-15 ENCOUNTER — TELEPHONE (OUTPATIENT)
Dept: ENDOCRINOLOGY | Facility: CLINIC | Age: 61
End: 2021-06-15

## 2021-06-15 DIAGNOSIS — Z79.4 TYPE 2 DIABETES MELLITUS WITHOUT COMPLICATION, WITH LONG-TERM CURRENT USE OF INSULIN (HCC): Primary | ICD-10-CM

## 2021-06-15 DIAGNOSIS — E11.9 TYPE 2 DIABETES MELLITUS WITHOUT COMPLICATION, WITH LONG-TERM CURRENT USE OF INSULIN (HCC): Primary | ICD-10-CM

## 2021-06-15 NOTE — TELEPHONE ENCOUNTER
Pt called upset her recent A1C is 9.9% and but checking her BG's due to not being able to use test strips with meter.  She keeps getting errors messages. Pt also c/o low BG's overnight if taking full pm dose of insulin when working.  Scheduled diabetes education for 6/16/21 at 1 pm.  Pt to bring her meter, test strips and insulin to appt. Pt verbalized an understanding. Chart shows pt is to take Novolin 70/30 40 units in the am and 30 units in the pm and has AccuChek Araceli.

## 2021-06-17 DIAGNOSIS — C50.919 MALIGNANT NEOPLASM OF FEMALE BREAST, UNSPECIFIED ESTROGEN RECEPTOR STATUS, UNSPECIFIED LATERALITY, UNSPECIFIED SITE OF BREAST (HCC): Primary | ICD-10-CM

## 2021-06-22 ENCOUNTER — TELEPHONE (OUTPATIENT)
Dept: ONCOLOGY | Facility: CLINIC | Age: 61
End: 2021-06-22

## 2021-06-23 ENCOUNTER — TELEPHONE (OUTPATIENT)
Dept: ONCOLOGY | Facility: CLINIC | Age: 61
End: 2021-06-23

## 2021-06-23 ENCOUNTER — MEDICATION THERAPY MANAGEMENT (OUTPATIENT)
Dept: PHARMACY | Facility: HOSPITAL | Age: 61
End: 2021-06-23

## 2021-06-23 NOTE — TELEPHONE ENCOUNTER
Caller: Gladys Garrison    Relationship: Self    Best call back number: [phone]    What is the best time to reach you: ASAP    Who are you requesting to speak with (clinical staff, provider,  specific staff member): HERNANDEZ    Do you know the name of the person who called: HERNANDEZ    What was the call regarding: RESCHEDULE    Do you require a callback: YES

## 2021-06-23 NOTE — PROGRESS NOTES
MTM note: Ibrance  Called pt to inquire about nausea & adherence.  Left vm.  Please call  if questions.  Vicente Davis, MilesD BCPS

## 2021-06-24 ENCOUNTER — TELEPHONE (OUTPATIENT)
Dept: ONCOLOGY | Facility: CLINIC | Age: 61
End: 2021-06-24

## 2021-06-24 NOTE — TELEPHONE ENCOUNTER
Left v/m thanking pt for calling and apologizing for not connecting. Gave direct # again and told her if I didn't hear from her today I would try her back also

## 2021-06-27 ENCOUNTER — APPOINTMENT (OUTPATIENT)
Dept: GENERAL RADIOLOGY | Facility: HOSPITAL | Age: 61
End: 2021-06-27

## 2021-06-27 ENCOUNTER — HOSPITAL ENCOUNTER (EMERGENCY)
Facility: HOSPITAL | Age: 61
Discharge: HOME | End: 2021-06-28
Admitting: EMERGENCY MEDICINE

## 2021-06-27 DIAGNOSIS — R07.81 RIB PAIN ON LEFT SIDE: Primary | ICD-10-CM

## 2021-06-27 LAB
GLUCOSE BLDC GLUCOMTR-MCNC: 49 MG/DL (ref 70–105)
GLUCOSE BLDC GLUCOMTR-MCNC: 75 MG/DL (ref 70–105)

## 2021-06-27 PROCEDURE — 82962 GLUCOSE BLOOD TEST: CPT

## 2021-06-27 PROCEDURE — 99283 EMERGENCY DEPT VISIT LOW MDM: CPT

## 2021-06-27 PROCEDURE — 71101 X-RAY EXAM UNILAT RIBS/CHEST: CPT

## 2021-06-28 VITALS
BODY MASS INDEX: 31.26 KG/M2 | HEIGHT: 61 IN | RESPIRATION RATE: 16 BRPM | SYSTOLIC BLOOD PRESSURE: 145 MMHG | OXYGEN SATURATION: 97 % | WEIGHT: 165.57 LBS | HEART RATE: 71 BPM | DIASTOLIC BLOOD PRESSURE: 78 MMHG | TEMPERATURE: 97.8 F

## 2021-07-02 ENCOUNTER — LAB (OUTPATIENT)
Dept: LAB | Facility: HOSPITAL | Age: 61
End: 2021-07-02

## 2021-07-02 DIAGNOSIS — C50.919 MALIGNANT NEOPLASM OF FEMALE BREAST, UNSPECIFIED ESTROGEN RECEPTOR STATUS, UNSPECIFIED LATERALITY, UNSPECIFIED SITE OF BREAST (HCC): ICD-10-CM

## 2021-07-02 DIAGNOSIS — C50.919 MALIGNANT NEOPLASM OF FEMALE BREAST, UNSPECIFIED ESTROGEN RECEPTOR STATUS, UNSPECIFIED LATERALITY, UNSPECIFIED SITE OF BREAST (HCC): Primary | ICD-10-CM

## 2021-07-02 LAB
BASOPHILS # BLD AUTO: 0.03 10*3/MM3 (ref 0–0.2)
BASOPHILS NFR BLD AUTO: 1.4 % (ref 0–1.5)
DEPRECATED RDW RBC AUTO: 51.6 FL (ref 37–54)
EOSINOPHIL # BLD AUTO: 0.01 10*3/MM3 (ref 0–0.4)
EOSINOPHIL NFR BLD AUTO: 0.5 % (ref 0.3–6.2)
ERYTHROCYTE [DISTWIDTH] IN BLOOD BY AUTOMATED COUNT: 16.4 % (ref 12.3–15.4)
HCT VFR BLD AUTO: 37.8 % (ref 34–46.6)
HGB BLD-MCNC: 12.1 G/DL (ref 12–15.9)
LYMPHOCYTES # BLD AUTO: 0.96 10*3/MM3 (ref 0.7–3.1)
LYMPHOCYTES NFR BLD AUTO: 43.8 % (ref 19.6–45.3)
MCH RBC QN AUTO: 29.4 PG (ref 26.6–33)
MCHC RBC AUTO-ENTMCNC: 32 G/DL (ref 31.5–35.7)
MCV RBC AUTO: 92 FL (ref 79–97)
MONOCYTES # BLD AUTO: 0.12 10*3/MM3 (ref 0.1–0.9)
MONOCYTES NFR BLD AUTO: 5.5 % (ref 5–12)
NEUTROPHILS NFR BLD AUTO: 1.07 10*3/MM3 (ref 1.7–7)
NEUTROPHILS NFR BLD AUTO: 48.8 % (ref 42.7–76)
PLATELET # BLD AUTO: 107 10*3/MM3 (ref 140–450)
PMV BLD AUTO: 9.8 FL (ref 6–12)
RBC # BLD AUTO: 4.11 10*6/MM3 (ref 3.77–5.28)
WBC # BLD AUTO: 2.19 10*3/MM3 (ref 3.4–10.8)

## 2021-07-02 PROCEDURE — 85025 COMPLETE CBC W/AUTO DIFF WBC: CPT

## 2021-07-02 PROCEDURE — 36415 COLL VENOUS BLD VENIPUNCTURE: CPT

## 2021-07-06 ENCOUNTER — TELEPHONE (OUTPATIENT)
Dept: ONCOLOGY | Facility: HOSPITAL | Age: 61
End: 2021-07-06

## 2021-07-06 ENCOUNTER — APPOINTMENT (OUTPATIENT)
Dept: CT IMAGING | Facility: HOSPITAL | Age: 61
End: 2021-07-06

## 2021-07-06 ENCOUNTER — APPOINTMENT (OUTPATIENT)
Dept: GENERAL RADIOLOGY | Facility: HOSPITAL | Age: 61
End: 2021-07-06

## 2021-07-06 ENCOUNTER — HOSPITAL ENCOUNTER (EMERGENCY)
Facility: HOSPITAL | Age: 61
Discharge: HOME | End: 2021-07-06
Admitting: EMERGENCY MEDICINE

## 2021-07-06 VITALS
DIASTOLIC BLOOD PRESSURE: 72 MMHG | BODY MASS INDEX: 31.05 KG/M2 | OXYGEN SATURATION: 99 % | TEMPERATURE: 97.9 F | WEIGHT: 164.46 LBS | RESPIRATION RATE: 18 BRPM | SYSTOLIC BLOOD PRESSURE: 118 MMHG | HEART RATE: 82 BPM | HEIGHT: 61 IN

## 2021-07-06 DIAGNOSIS — R07.9 CHEST PAIN, UNSPECIFIED TYPE: Primary | ICD-10-CM

## 2021-07-06 LAB
ALBUMIN SERPL-MCNC: 4.4 G/DL (ref 3.5–5.2)
ALBUMIN/GLOB SERPL: 1.6 G/DL
ALP SERPL-CCNC: 59 U/L (ref 39–117)
ALT SERPL W P-5'-P-CCNC: 14 U/L (ref 1–33)
ANION GAP SERPL CALCULATED.3IONS-SCNC: 10 MMOL/L (ref 5–15)
AST SERPL-CCNC: 18 U/L (ref 1–32)
BASOPHILS # BLD AUTO: 0 10*3/MM3 (ref 0–0.2)
BASOPHILS NFR BLD AUTO: 0.7 % (ref 0–1.5)
BILIRUB SERPL-MCNC: 0.4 MG/DL (ref 0–1.2)
BUN SERPL-MCNC: 14 MG/DL (ref 6–20)
BUN/CREAT SERPL: 17.7 (ref 7–25)
CALCIUM SPEC-SCNC: 9 MG/DL (ref 8.6–10.5)
CHLORIDE SERPL-SCNC: 106 MMOL/L (ref 98–107)
CO2 SERPL-SCNC: 23 MMOL/L (ref 22–29)
CREAT SERPL-MCNC: 0.79 MG/DL (ref 0.57–1)
D DIMER PPP FEU-MCNC: 0.33 MG/L (FEU) (ref 0–0.59)
DEPRECATED RDW RBC AUTO: 56 FL (ref 37–54)
EOSINOPHIL # BLD AUTO: 0 10*3/MM3 (ref 0–0.4)
EOSINOPHIL NFR BLD AUTO: 0.4 % (ref 0.3–6.2)
ERYTHROCYTE [DISTWIDTH] IN BLOOD BY AUTOMATED COUNT: 18.6 % (ref 12.3–15.4)
GFR SERPL CREATININE-BSD FRML MDRD: 75 ML/MIN/1.73
GLOBULIN UR ELPH-MCNC: 2.8 GM/DL
GLUCOSE SERPL-MCNC: 178 MG/DL (ref 65–99)
HCT VFR BLD AUTO: 34 % (ref 34–46.6)
HGB BLD-MCNC: 11.2 G/DL (ref 12–15.9)
LYMPHOCYTES # BLD AUTO: 0.7 10*3/MM3 (ref 0.7–3.1)
LYMPHOCYTES NFR BLD AUTO: 36.5 % (ref 19.6–45.3)
MCH RBC QN AUTO: 29.1 PG (ref 26.6–33)
MCHC RBC AUTO-ENTMCNC: 33 G/DL (ref 31.5–35.7)
MCV RBC AUTO: 88.2 FL (ref 79–97)
MONOCYTES # BLD AUTO: 0.1 10*3/MM3 (ref 0.1–0.9)
MONOCYTES NFR BLD AUTO: 7.1 % (ref 5–12)
NEUTROPHILS NFR BLD AUTO: 1.1 10*3/MM3 (ref 1.7–7)
NEUTROPHILS NFR BLD AUTO: 55.3 % (ref 42.7–76)
NRBC BLD AUTO-RTO: 0.6 /100 WBC (ref 0–0.2)
PLATELET # BLD AUTO: 97 10*3/MM3 (ref 140–450)
PMV BLD AUTO: 7.9 FL (ref 6–12)
POTASSIUM SERPL-SCNC: 4.7 MMOL/L (ref 3.5–5.2)
PROT SERPL-MCNC: 7.2 G/DL (ref 6–8.5)
RBC # BLD AUTO: 3.86 10*6/MM3 (ref 3.77–5.28)
SODIUM SERPL-SCNC: 139 MMOL/L (ref 136–145)
TROPONIN T SERPL-MCNC: <0.01 NG/ML (ref 0–0.03)
TROPONIN T SERPL-MCNC: <0.01 NG/ML (ref 0–0.03)
WBC # BLD AUTO: 2 10*3/MM3 (ref 3.4–10.8)
WHOLE BLOOD HOLD SPECIMEN: NORMAL

## 2021-07-06 PROCEDURE — 84484 ASSAY OF TROPONIN QUANT: CPT | Performed by: NURSE PRACTITIONER

## 2021-07-06 PROCEDURE — 36415 COLL VENOUS BLD VENIPUNCTURE: CPT | Performed by: NURSE PRACTITIONER

## 2021-07-06 PROCEDURE — 93005 ELECTROCARDIOGRAM TRACING: CPT | Performed by: NURSE PRACTITIONER

## 2021-07-06 PROCEDURE — 99283 EMERGENCY DEPT VISIT LOW MDM: CPT

## 2021-07-06 PROCEDURE — 96374 THER/PROPH/DIAG INJ IV PUSH: CPT

## 2021-07-06 PROCEDURE — 85025 COMPLETE CBC W/AUTO DIFF WBC: CPT | Performed by: NURSE PRACTITIONER

## 2021-07-06 PROCEDURE — 71250 CT THORAX DX C-: CPT

## 2021-07-06 PROCEDURE — 93005 ELECTROCARDIOGRAM TRACING: CPT

## 2021-07-06 PROCEDURE — 71045 X-RAY EXAM CHEST 1 VIEW: CPT

## 2021-07-06 PROCEDURE — 25010000002 ORPHENADRINE CITRATE PER 60 MG: Performed by: NURSE PRACTITIONER

## 2021-07-06 PROCEDURE — 85379 FIBRIN DEGRADATION QUANT: CPT | Performed by: NURSE PRACTITIONER

## 2021-07-06 PROCEDURE — 80053 COMPREHEN METABOLIC PANEL: CPT | Performed by: NURSE PRACTITIONER

## 2021-07-06 RX ORDER — SODIUM CHLORIDE 0.9 % (FLUSH) 0.9 %
10 SYRINGE (ML) INJECTION AS NEEDED
Status: DISCONTINUED | OUTPATIENT
Start: 2021-07-06 | End: 2021-07-06 | Stop reason: HOSPADM

## 2021-07-06 RX ORDER — ORPHENADRINE CITRATE 30 MG/ML
60 INJECTION INTRAMUSCULAR; INTRAVENOUS ONCE
Status: COMPLETED | OUTPATIENT
Start: 2021-07-06 | End: 2021-07-06

## 2021-07-06 RX ORDER — ASPIRIN 81 MG/1
324 TABLET, CHEWABLE ORAL ONCE
Status: COMPLETED | OUTPATIENT
Start: 2021-07-06 | End: 2021-07-06

## 2021-07-06 RX ADMIN — ASPIRIN 324 MG: 81 TABLET, CHEWABLE ORAL at 09:59

## 2021-07-06 RX ADMIN — ORPHENADRINE CITRATE 60 MG: 60 INJECTION INTRAMUSCULAR; INTRAVENOUS at 11:26

## 2021-07-06 NOTE — TELEPHONE ENCOUNTER
Patient had initially called stating she had just gotten out of the hospital and was still in a lot of pain, however she did not want to take pain meds for fear of addiction.  She advised that she was not afraid of dying and was not depressed but wanted a timeline on when we thought she might die.  I advised that we can possibly give statistics on life expectencies however we are not able to give more than that as we have no way of knowing for sure.  I asked her if she was currently on something for depression as she was having a flight of emotions during our conversation and she stated she was not depressed or anxious.  She stated that she felt like she was not being given all of the information and that other people in her life did not believe that she has cancer.  I told Fidelina PAPPAS of patients concerns and Kita as well and put patient on each of there schedule for tomorrow at 1430.  Patient VU of appt time.

## 2021-07-06 NOTE — TELEPHONE ENCOUNTER
PATIENT JUST LEFT THE E.R. AND IS STILL IN PAIN.  SHE WOULD LIKE FOR SOMEONE TO CALL HER ASAP PLEASE.   THANK YOU     [phone]

## 2021-07-06 NOTE — ED PROVIDER NOTES
Subjective   History: Is a 60-year-old female history of breast cancer CAD diabetes hep C hypertension complains of chest pain.  Recently seen in the ER for same complaint after bending over reaching down to her fridge feeling a pop to the left side thinking she broke a rib.  Diagnosed with possible left rib fracture.  Reports she continues to have discomfort and after patient was kajal-rayed a few days ago found no left rib fracture.  Was instructed to return to ER for further cardiac work-up.  Patient reports the pain is constant, sharp, worse with movement, worse with moving her arms.  Reports she does have some shortness of breath intermittently but does not feel like that is changed from her baseline.  She does have a cardiac history has a history of open heart cardiac ablation stent x1 and artificial valve.      Onset: 6/27/2021  Location: Left lower chest  Duration: Constant  Character: Sharp   Aggravating/Alleviating factors: Worse with arm movement  Radiation nonradiating  Severity: Moderate            Review of Systems   Constitutional: Negative for chills, fatigue and fever.   HENT: Negative for congestion, sore throat, tinnitus and trouble swallowing.    Eyes: Negative for photophobia, discharge and visual disturbance.   Respiratory: Positive for cough and shortness of breath.    Cardiovascular: Positive for chest pain.   Gastrointestinal: Positive for nausea. Negative for abdominal pain, diarrhea and vomiting.   Genitourinary: Negative for dysuria, frequency and urgency.   Musculoskeletal: Negative for back pain and myalgias.   Skin: Negative for rash.   Neurological: Negative for dizziness and headaches.   Psychiatric/Behavioral: Negative for confusion.       Past Medical History:   Diagnosis Date   • Allergic    • Breast cancer (CMS/HCC) 2017    mets to lymph nodes; did not do radiation   • Cancer of unknown origin (CMS/HCC)    • Compression fx, thoracic spine, open, initial encounter (CMS/HCC)    •  Coronary artery disease    • Diabetes mellitus (CMS/HCC)    • Heart disease, unspecified    • Hepatitis C    • Hypertension    • Obesity (BMI 30-39.9) 2021   • Sleep apnea     no machine   • Type 2 diabetes mellitus (CMS/HCC) 2017       Allergies   Allergen Reactions   • Promethazine Mental Status Change   • Tape Other (See Comments)     .blisters         Past Surgical History:   Procedure Laterality Date   • BACK SURGERY      neck   • BREAST RECONSTRUCTION Bilateral    • CARDIAC ABLATION       atrial tachycardia x 5 ablations    • CARDIAC CATHETERIZATION     • CARDIAC SURGERY      stent placed in aorta   • CARDIAC SURGERY      6 surgeries as baby    • CERVICAL FUSION ANTERIOR WITH ARTIFICIAL DISCECTOMY IMPLANTATION N/A 2020    Procedure: C4 VERTEBRECTOMY AND ANTERIOR CERIVCAL DISCECTOMY WITH FUSION OF CERVICAL THREE THROUGH FIVE WITH REMOVAL OF HARDWARE C5-C6;  Surgeon: Mark Kaba MD;  Location: [facility] MAIN OR;  Service: Neurosurgery;  Laterality: N/A;   •  SECTION      x2   • COLONOSCOPY N/A 10/23/2020    Procedure: COLONOSCOPY WITH POLYPECTOMY X6;  Surgeon: Stanley Bourne MD;  Location: [facility] ENDOSCOPY;  Service: Gastroenterology;  Laterality: N/A;  POLYPS, INTERNAL HEMORRHOIDS   • ENDOSCOPY N/A 10/23/2020    Procedure: ESOPHAGOGASTRODUODENOSCOPY WITH BIOPSY X 1 AREA;  Surgeon: Stanley Bourne MD;  Location: [facility] ENDOSCOPY;  Service: Gastroenterology;  Laterality: N/A;  GASTRITIS, ESOPHAGITIS, HIATAL HERNIA   • KNEE SURGERY     • MASTECTOMY Bilateral    • NECK SURGERY     • PACEMAKER IMPLANTATION         Family History   Problem Relation Age of Onset   • Heart disease Mother    • Stroke Mother    • Lung cancer Mother    • Aneurysm Father    • Diabetes Sister    • No Known Problems Brother    • No Known Problems Brother    • Diabetes type I Half-Sister    • Thyroid cancer Half-Sister    • Cancer Maternal Aunt    • Heart attack Sister    • Thyroid disease  Sister    • No Known Problems Sister        Social History     Socioeconomic History   • Marital status:      Spouse name: Not on file   • Number of children: 2   • Years of education: Not on file   • Highest education level: Not on file   Tobacco Use   • Smoking status: Never Smoker   • Smokeless tobacco: Never Used   Vaping Use   • Vaping Use: Never used   Substance and Sexual Activity   • Alcohol use: Yes     Alcohol/week: 1.0 standard drinks     Types: 1 Glasses of wine per week     Comment: rare   • Drug use: Not Currently   • Sexual activity: Defer           Objective   Physical Exam  Vitals reviewed.   Constitutional:       General: She is not in acute distress.     Appearance: She is normal weight. She is not toxic-appearing.   HENT:      Head: Normocephalic and atraumatic.      Mouth/Throat:      Mouth: Mucous membranes are moist.      Pharynx: Oropharynx is clear.   Eyes:      Pupils: Pupils are equal, round, and reactive to light.   Cardiovascular:      Rate and Rhythm: Normal rate.      Heart sounds: No murmur heard.     Pulmonary:      Effort: Pulmonary effort is normal.   Chest:      Chest wall: Tenderness present. No crepitus.   Abdominal:      General: Bowel sounds are normal.      Palpations: Abdomen is soft. There is no mass.      Tenderness: There is no abdominal tenderness. There is no guarding or rebound.   Musculoskeletal:      Cervical back: Normal range of motion and neck supple.      Right lower leg: No tenderness. No edema.      Left lower leg: No tenderness. No edema.      Comments: Lateral lower extremities equal nontender no erythema negative bilateral Homans.   Skin:     General: Skin is warm and dry.      Findings: No rash.   Neurological:      Mental Status: She is alert and oriented to person, place, and time.   Psychiatric:         Mood and Affect: Mood normal.         Behavior: Behavior normal.         Procedures           ED Course    /98 (BP Location: Left arm,  "Patient Position: Sitting)   Pulse 82   Temp 97.9 °F (36.6 °C) (Oral)   Resp 18   Ht 154.9 cm (61\")   Wt 74.6 kg (164 lb 7.4 oz)   SpO2 99%   BMI 31.08 kg/m²   Labs Reviewed   COMPREHENSIVE METABOLIC PANEL - Abnormal; Notable for the following components:       Result Value    Glucose 178 (*)     All other components within normal limits    Narrative:     GFR Normal >60  Chronic Kidney Disease <60  Kidney Failure <15     CBC WITH AUTO DIFFERENTIAL - Abnormal; Notable for the following components:    WBC 2.00 (*)     Hemoglobin 11.2 (*)     RDW 18.6 (*)     RDW-SD 56.0 (*)     Platelets 97 (*)     Neutrophils, Absolute 1.10 (*)     nRBC 0.6 (*)     All other components within normal limits   TROPONIN (IN-HOUSE) - Normal    Narrative:     Troponin T Reference Range:  <= 0.03 ng/mL-   Negative for AMI  >0.03 ng/mL-     Abnormal for myocardial necrosis.  Clinicians would have to utilize clinical acumen, EKG, Troponin and serial changes to determine if it is an Acute Myocardial Infarction or myocardial injury due to an underlying chronic condition.       Results may be falsely decreased if patient taking Biotin.     TROPONIN (IN-HOUSE) - Normal    Narrative:     Troponin T Reference Range:  <= 0.03 ng/mL-   Negative for AMI  >0.03 ng/mL-     Abnormal for myocardial necrosis.  Clinicians would have to utilize clinical acumen, EKG, Troponin and serial changes to determine if it is an Acute Myocardial Infarction or myocardial injury due to an underlying chronic condition.       Results may be falsely decreased if patient taking Biotin.     D-DIMER, QUANTITATIVE - Normal    Narrative:     Reference Range  --------------------------------------------------------------------     < 0.50   Negative Predictive Value  0.50-0.59   Indeterminate    >= 0.60   Probable VTE             A very low percentage of patients with DVT may yield D-Dimer results   below the cut-off of 0.50 mg/L FEU.  This is known to be more   prevalent " in patients with distal DVT.             Results of this test should always be interpreted in conjunction with   the patient's medical history, clinical presentation and other   findings.  Clinical diagnosis should not be based on the result of   INNOVANCE D-Dimer alone.   RAINBOW DRAW    Narrative:     The following orders were created for panel order [place] Draw.  Procedure                               Abnormality         Status                     ---------                               -----------         ------                     Green Top (Gel)[[id number]]                                  Final result               Lavender Top[[id number]]                                     Final result               Gold Top - SST[[id number]]                                   Final result                 Please view results for these tests on the individual orders.   CBC AND DIFFERENTIAL    Narrative:     The following orders were created for panel order CBC & Differential.  Procedure                               Abnormality         Status                     ---------                               -----------         ------                     CBC Auto Differential[[id number]]        Abnormal            Final result                 Please view results for these tests on the individual orders.   GREEN TOP   LAVENDER TOP   GOLD TOP - SST     Medications   sodium chloride 0.9 % flush 10 mL (has no administration in time range)   aspirin chewable tablet 324 mg (324 mg Oral Given 7/6/21 0959)   orphenadrine (NORFLEX) injection 60 mg (60 mg Intravenous Given 7/6/21 1126)     CT Chest Without Contrast Diagnostic    Result Date: 7/6/2021   1. There is new sclerosis within the right 12th rib posteriorly which could represent metastatic lesion or a healed or healing fracture. 2. There is new sclerosis in the right eighth rib laterally, corresponding to findings on previous bone scan from 3/12/2021. This is suspicious for potential  metastatic disease. 3. There is new sclerosis within the right T8 transverse process worrisome for metastatic disease progression. 4. Old left fifth rib fracture, unchanged. No convincing evidence of acute left rib deformity or left rib osseous metastatic disease. 5. Old T9 and T12 superior endplate compression deformities. 6. Scattered areas of chronic fibrosis in both lungs. No superimposed acute airspace disease. 7. Stable cardiomegaly. Stable pulmonary artery dilation. 8. Presumed pulmonary valve repair.  Electronically Signed By-Kym Antony MD On:7/6/2021 11:07 AM This report was finalized on [id number] by  Kym Antony MD.    XR Chest 1 View    Result Date: 7/6/2021    1.  Stable cardiomegaly. 2.  No acute cardiopulmonary disease identified.   Electronically Signed By-Marc Noguera MD On:7/6/2021 10:10 AM This report was finalized on [id number] by  Marc Noguera MD.      ED Course as of Jul 06 1348   Tue Jul 06, 2021   1310 Awaiting 2nd trop draw, lab at bedside  now    [KJ]      ED Course User Index  [KJ] rBenda Castellanos, APRN                                           MDM     I examined the patient using the appropriate personal protective equipment.      DISPOSITION:   Chart Review:  Comorbidity:  has a past medical history of Allergic, Breast cancer (CMS/HCC) (2017), Cancer of unknown origin (CMS/HCC), Compression fx, thoracic spine, open, initial encounter (CMS/[facility]), Coronary artery disease, Diabetes mellitus (CMS/HCC), Heart disease, unspecified, Hepatitis C, Hypertension, Obesity (BMI 30-39.9) (2/5/2021), Sleep apnea, and Type 2 diabetes mellitus (CMS/HCC) (11/2017).  Differentials:this list is not all inclusive and does not constitute the entirety of considered causes --> rib fracture strain STEMI non-STEMI PE metastatic CA  ECG: interpreted by ER physician dr sheikh and reviewed by myself: AV dual paced rhythm rate 73 compared to previous EKG 2/4/2021 ventricular paced rhythm rate  81  Labs: CBC WC 2.0 platelets 97.  CMP glucose 170 otherwise unremarkable.  Dimer negative troponin negative x2    Imaging: Was interpreted by physician and reviewed by myself:  CT Chest Without Contrast Diagnostic    Result Date: 7/6/2021   1. There is new sclerosis within the right 12th rib posteriorly which could represent metastatic lesion or a healed or healing fracture. 2. There is new sclerosis in the right eighth rib laterally, corresponding to findings on previous bone scan from 3/12/2021. This is suspicious for potential metastatic disease. 3. There is new sclerosis within the right T8 transverse process worrisome for metastatic disease progression. 4. Old left fifth rib fracture, unchanged. No convincing evidence of acute left rib deformity or left rib osseous metastatic disease. 5. Old T9 and T12 superior endplate compression deformities. 6. Scattered areas of chronic fibrosis in both lungs. No superimposed acute airspace disease. 7. Stable cardiomegaly. Stable pulmonary artery dilation. 8. Presumed pulmonary valve repair.  Electronically Signed By-Kym Antony MD On:7/6/2021 11:07 AM This report was finalized on [id number] by  Kym Antony MD.    XR Chest 1 View    Result Date: 7/6/2021    1.  Stable cardiomegaly. 2.  No acute cardiopulmonary disease identified.   Electronically Signed By-Marc Noguera MD On:7/6/2021 10:10 AM This report was finalized on [id number] by  Marc Noguera MD.      Disposition/Treatment:    Patient had aspirin 324 and Norflex injection for pain.  Lab work was fairly unremarkable platelets were 97 that appears to be fairly normal for patient has previously resulted platelet count between 50s and 108.  Dimer and troponin x2 were both negative.  Patient does have a history of breast cancer she reports she does not like taking her pain medication at home because she does not want to get used to it.  Patient is able to pinpoint the area of discomfort which is left  lower chest rib cage area.  No pain with palpation however she reports pain with any movement and raising of her arms to pinpoint area on the left lower rib cage.  Description of pain per patient does sound more musculoskeletal.  Chest x-ray was negative for any fracture dislocation of ribs which has been repeated imaging since last week when she was here in the ER.  CT chest showed There is new sclerosis within the right 12th rib posteriorly which  could represent metastatic lesion or a healed or healing fracture.There is new sclerosis in the right eighth rib laterally,  corresponding to findings on previous bone scan from 3/12/2021. This is suspicious for potential metastatic disease.  There is new sclerosis within the right T8 transverse process  worrisome for metastatic disease progression. Old left fifth rib fracture, unchanged. No convincing evidence of  acute left rib deformity or left rib osseous metastatic disease.Old T9 and T12 superior endplate compression deformities.Scattered areas of chronic fibrosis in both lungs. No superimposed  acute airspace disease. Stable cardiomegaly. Stable pulmonary artery dilation.Presumed pulmonary valve repair.    She will be discharged home to follow-up with her primary care provider.    Final diagnoses:   Chest pain, unspecified type       ED Disposition  ED Disposition     ED Disposition Condition Comment    Discharge Stable           Kourtney Coffman, APRN  [address]  [address]  [place] [id number] [phone]               Medication List      Changed    aspirin 81 MG chewable tablet  Chew 1 tablet Daily.  What changed: additional instructions             Brenda Castellanos, CIRO  07/06/21 9173

## 2021-07-06 NOTE — TELEPHONE ENCOUNTER
Late entry from 7/3/21. Pt called complaining of left sided chest pain and SOA.  I advised her to proceed to the ER for cardiac workup.  She states that she will get a ride to the ER from a family member.

## 2021-07-06 NOTE — TELEPHONE ENCOUNTER
Pt called today c/o  Left side chest pain and SOA.  She was advised to go to the ER as we don't have access to EKG to monitor heart rhythm. She v/u.

## 2021-07-07 ENCOUNTER — OFFICE VISIT (OUTPATIENT)
Dept: ONCOLOGY | Facility: CLINIC | Age: 61
End: 2021-07-07

## 2021-07-07 VITALS
HEART RATE: 79 BPM | WEIGHT: 162.2 LBS | BODY MASS INDEX: 30.62 KG/M2 | TEMPERATURE: 97.5 F | DIASTOLIC BLOOD PRESSURE: 90 MMHG | RESPIRATION RATE: 18 BRPM | HEIGHT: 61 IN | SYSTOLIC BLOOD PRESSURE: 163 MMHG

## 2021-07-07 DIAGNOSIS — C50.919 MALIGNANT NEOPLASM OF FEMALE BREAST, UNSPECIFIED ESTROGEN RECEPTOR STATUS, UNSPECIFIED LATERALITY, UNSPECIFIED SITE OF BREAST (HCC): Primary | ICD-10-CM

## 2021-07-07 PROCEDURE — 99215 OFFICE O/P EST HI 40 MIN: CPT | Performed by: NURSE PRACTITIONER

## 2021-07-07 RX ORDER — CELECOXIB 200 MG/1
200 CAPSULE ORAL DAILY
Qty: 20 CAPSULE | Refills: 1 | Status: SHIPPED | OUTPATIENT
Start: 2021-07-07 | End: 2021-09-01 | Stop reason: SDUPTHER

## 2021-07-07 RX ORDER — LIDOCAINE 50 MG/G
1 PATCH TOPICAL EVERY 24 HOURS
Qty: 10 PATCH | Refills: 2 | Status: SHIPPED | OUTPATIENT
Start: 2021-07-07 | End: 2021-09-01

## 2021-07-07 NOTE — PROGRESS NOTES
Hematology/Oncology Outpatient Follow Up    PATIENT NAME:Gladys Garrison  :1960  MRN: [id number]  PRIMARY CARE PHYSICIAN: Kourtney Coffman APRN  REFERRING PHYSICIAN: Kourtney Coffman APRN    Chief Complaint   Patient presents with   • Follow-up     Malignant neoplasm of female breast, unspecified estrogen receptor status, unspecified laterality, unspecified site of breast, pain LUQ, Back pain,         HISTORY OF PRESENT ILLNESS:     This is a 60-year-old female who multiple comorbidities including congenital abnormalities such as hypoplastic kidney, tetralogy of Fallot, coarctation of the aorta and congenital VSD status post repair.  Patient developed syncopal episode and for that reason she had she had a CT scan of the chest which showed mass in the left breast.  She had diagnostic mammogram and ultrasound which showed a 2 cm spiculated mass in the left breast at the 1 o'clock position 6 cm from the nipple.  Biopsy of the left breast mass revealed invasive moderately differentiated carcinoma ER/UT positive and HER-2/bishop negative.  Patient also had an ultrasound of the axilla which was concerning for an abnormal left axillary lymph node with cortical thickening.  She apparently had an FNA of the left axillary nodule which was positive for malignancy.  On 2017 patient underwent left modified radical mastectomy, right prophylactic mastectomy and immediate breast reconstruction.  She also underwent right prophylactic mastectomy.  We have had her on records suggest that patient did have multifocal disease with pT2 pN1 aM0.  The largest focus measured 3.5 cm.  2 of 11 lymph nodes were positive for metastatic disease some with extracapsular extension.  Notes that patient did receive Arimidex neoadjuvant  from 2017 to 2018 prior to her bilateral mastectomies.    Review of her note suggest that she developed cough which she attributed to anastrozole and patient was then placed on tamoxifen.  She  had MammaPrint testing which returned with low risk for relapse.    Her postop course was also complicated by left chest wall abscess which resulted in I&D and removal of the left tissue expander. She was referred for radiation treatment boards ultimately declined.    Patient was then placed on tamoxifen in April 2018 which she stopped after less than a month due to nausea and vomiting.  Patient in the interim also was diagnosed with chronic hepatitis C was seen by the hepatologist.  Tamoxifen was dose reduced to 10 mg daily with the goal to increase to 20 mg daily.      Patient has relocated to [facility].  She has transitioned her care to us now.  She is currently not on any antiestrogen therapy.     Her bilateral mastectomy specimen are available for review    · 1/29/2021 patient had bone density which showed osteoporosis  · Patient was seen by neurosurgery and had a bone scan done in March 2021 which showed subtle activity in the inferior L4 vertebral body appears to correlate with new area of sclerosis on CT of the abdomen and pelvis.  There is also subtle activity with possible new lesion at the posterior left sacrum.  These findings were concerning for metastatic disease.  PET CT scan was recommended to further evaluate.  · 4/8/2021 patient had a PET CT scan which showed evidence of disease in the neck chest abdomen and pelvis.  But she has a 1.1 cm mixed lytic/sclerotic hypermetabolic lesion within the left hemisacrum consistent with metastatic disease.  There is also accumulation within the inferior endplate of the L4 vertebral body thought to correspond to 1.2 centimeters sclerotic lesion consistent with metastatic disease.  There is a tiny hypermetabolic focus within the L3, T11.  Suspicious for also early metastatic disease.  · 4/26/2021 patient underwent CT-guided biopsy of sacral lesion, pathology was consistent with metastatic breast cancer ER and MN positive HER-2/bishop was negative.  · 7/6/21 CT  new sclerosis within the right 12th rib posteriorly which could represent metastatic lesion or a healed or healing fracture, new sclerosis within the right 12th rib posteriorly which         could represent metastatic lesion,new sclerosis within the right T8 transverse process worrisome for metastatic disease progression.  · 21 complaints of 8/10 back pain and 8/10 LUQ pain. Referral to radiation for questionable mets on CT chest.    History of present illness was reviewed and is unchanged from the previous visit. 21      Past Medical History:   Diagnosis Date   • Allergic    • Breast cancer (CMS/HCC) 2017    mets to lymph nodes; did not do radiation   • Cancer of unknown origin (CMS/HCC)    • Compression fx, thoracic spine, open, initial encounter (CMS/HCC)    • Coronary artery disease    • Diabetes mellitus (CMS/HCC)    • Heart disease, unspecified    • Hepatitis C    • Hypertension    • Obesity (BMI 30-39.9) 2021   • Sleep apnea     no machine   • Type 2 diabetes mellitus (CMS/HCC) 2017       Past Surgical History:   Procedure Laterality Date   • BACK SURGERY      neck   • BREAST RECONSTRUCTION Bilateral    • CARDIAC ABLATION       atrial tachycardia x 5 ablations    • CARDIAC CATHETERIZATION     • CARDIAC SURGERY      stent placed in aorta   • CARDIAC SURGERY      6 surgeries as baby    • CERVICAL FUSION ANTERIOR WITH ARTIFICIAL DISCECTOMY IMPLANTATION N/A 2020    Procedure: C4 VERTEBRECTOMY AND ANTERIOR CERIVCAL DISCECTOMY WITH FUSION OF CERVICAL THREE THROUGH FIVE WITH REMOVAL OF HARDWARE C5-C6;  Surgeon: Mark Kaba MD;  Location: [facility] MAIN OR;  Service: Neurosurgery;  Laterality: N/A;   •  SECTION      x2   • COLONOSCOPY N/A 10/23/2020    Procedure: COLONOSCOPY WITH POLYPECTOMY X6;  Surgeon: Stnaley Bourne MD;  Location: [facility] ENDOSCOPY;  Service: Gastroenterology;  Laterality: N/A;  POLYPS, INTERNAL HEMORRHOIDS   • ENDOSCOPY N/A 10/23/2020    Procedure:  "ESOPHAGOGASTRODUODENOSCOPY WITH BIOPSY X 1 AREA;  Surgeon: Stanley Bourne MD;  Location: [facility] ENDOSCOPY;  Service: Gastroenterology;  Laterality: N/A;  GASTRITIS, ESOPHAGITIS, HIATAL HERNIA   • KNEE SURGERY  2000   • MASTECTOMY Bilateral    • NECK SURGERY     • PACEMAKER IMPLANTATION           Current Outpatient Medications:   •  Accu-Chek FastClix Lancets misc, For finger stick 4x/d, Disp: 100 each, Rfl: 12  •  Alcohol Swabs 70 % pads, Use tid, Disp: 300 each, Rfl: 2  •  anastrozole (ARIMIDEX) 1 MG tablet, Take 1 tablet by mouth Daily., Disp: 30 tablet, Rfl: 6  •  aspirin 81 MG chewable tablet, Chew 1 tablet Daily. (Patient taking differently: Chew 81 mg Daily. Hold DOS), Disp: 30 tablet, Rfl: 1  •  Blood Glucose Monitoring Suppl (ACCU-CHEK ARACELI PLUS) w/Device kit, Use as directed   DX  E11.9, Disp: 1 kit, Rfl: 0  •  Calcium Carbonate-Vit D-Min (Calcium 600+D Plus Minerals) 600-400 MG-UNIT chewable tablet, Chew 600 mg 2 (Two) Times a Day., Disp: 60 each, Rfl: 6  •  Continuous Blood Gluc Sensor (FreeStyle Rajeev 14 Day Sensor) [facility], 1 each Every 14 (Fourteen) Days., Disp: 6 each, Rfl: 1  •  cyclobenzaprine (FLEXERIL) 10 MG tablet, Take 1 tablet by mouth 3 (Three) Times a Day As Needed for Muscle Spasms., Disp: 90 tablet, Rfl: 0  •  glucose blood (Accu-Chek Araceli Plus) test strip, To check blood sugar 4x/d, Disp: 150 each, Rfl: 12  •  insulin NPH-insulin regular (humuLIN 70/30,novoLIN 70/30) (70-30) 100 UNIT/ML injection, Inject 40 Units under the skin into the appropriate area as directed Every Morning., Disp: , Rfl:   •  insulin NPH-insulin regular (humuLIN 70/30,novoLIN 70/30) (70-30) 100 UNIT/ML injection, Inject 30 Units under the skin into the appropriate area as directed Every Evening., Disp: , Rfl:   •  insulin NPH-insulin regular (NovoLIN 70/30) (70-30) 100 UNIT/ML injection, Inject  under the skin into the appropriate area as directed., Disp: , Rfl:   •  Insulin Syringe 31G X 5/16\" 1 ML misc, " 1 syringe 2 (Two) Times a Day., Disp: 100 each, Rfl: 1  •  lisinopril (PRINIVIL,ZESTRIL) 20 MG tablet, Take 20 mg by mouth Daily., Disp: , Rfl:   •  ondansetron (ZOFRAN) 8 MG tablet, Take 1 tablet by mouth 3 (Three) Times a Day As Needed for Nausea or Vomiting., Disp: 30 tablet, Rfl: 5  •  oxyCODONE-acetaminophen (PERCOCET) 5-325 MG per tablet, Take 1 tablet by mouth Take As Directed. Take 1 tablet by mouth every 4-6 hours prn pain, Disp: 40 tablet, Rfl: 0  •  Palbociclib 125 MG tablet, Take 125 mg by mouth Daily., Disp: , Rfl:   •  prochlorperazine (COMPAZINE) 10 MG tablet, Take 1 tablet by mouth Every 6 (Six) Hours As Needed for Nausea or Vomiting., Disp: 90 tablet, Rfl: 1  •  rosuvastatin (Crestor) 20 MG tablet, Take 1 tablet by mouth Every Night., Disp: 90 tablet, Rfl: 0  •  celecoxib (CeleBREX) 200 MG capsule, Take 1 capsule by mouth Daily. Take one tablet in the am, Disp: 20 capsule, Rfl: 1  •  lidocaine (LIDODERM) 5 %, Place 1 patch on the skin as directed by provider Daily. Remove & Discard patch within 12 hours or as directed by MD, Disp: 10 patch, Rfl: 2    Allergies   Allergen Reactions   • Promethazine Mental Status Change   • Tape Other (See Comments)     .blisters         Family History   Problem Relation Age of Onset   • Heart disease Mother    • Stroke Mother    • Lung cancer Mother    • Aneurysm Father    • Diabetes Sister    • No Known Problems Brother    • No Known Problems Brother    • Diabetes type I Half-Sister    • Thyroid cancer Half-Sister    • Cancer Maternal Aunt    • Heart attack Sister    • Thyroid disease Sister    • No Known Problems Sister        Cancer-related family history includes Cancer in her maternal aunt; Lung cancer in her mother; Thyroid cancer in her half-sister.    Social History     Tobacco Use   • Smoking status: Never Smoker   • Smokeless tobacco: Never Used   Vaping Use   • Vaping Use: Never used   Substance Use Topics   • Alcohol use: Yes     Alcohol/week: 1.0  standard drinks     Types: 1 Glasses of wine per week     Comment: rare   • Drug use: Not Currently       HPI, ROS and PFSH have been reviewed and confirmed on 7/7/2021.     SUBJECTIVE:    The patient presents with complaints of LUQ pain unrelieved by 2 tablets Marilu Aspirin and half tab Percocet at night.  The patient is reluctant to take narcotics.  She will try heating pad for back pain and left upper quadrant pain.  She complains of 8 of 10 back pain unrelieved.  The patient has tried no other remedies for pain.  The patient expresses distress about diagnosis and prognosis.  The patient expresses increasing forgetfulness of names, her insulin medications, and increasing dizziness.  The patient verbalizes that she is anxious but is reluctant to try any anxiety medications.              REVIEW OF SYSTEMS:    Review of Systems   Constitutional: Positive for fatigue (complains of worsening fatigue). Negative for appetite change, chills and fever.   HENT: Negative for ear pain, mouth sores, nosebleeds and sore throat.    Eyes: Negative for photophobia, pain and visual disturbance.   Respiratory: Negative for cough, chest tightness, wheezing and stridor.    Cardiovascular: Negative for chest pain, palpitations and leg swelling.   Gastrointestinal: Positive for abdominal pain (patient complains of 8/10 LUQ pain) and nausea (periodic episodes of nausea). Negative for constipation, diarrhea and vomiting.   Endocrine: Negative for cold intolerance and heat intolerance.   Genitourinary: Negative for dysuria and hematuria.   Musculoskeletal: Positive for arthralgias and back pain (patient complains of 8/10 back pain). Negative for joint swelling and neck stiffness.        Neck pain   Skin: Negative for color change and rash.   Neurological: Positive for dizziness (complains of dizziness). Negative for seizures, syncope and headaches.        Complains of recently increasing forgetfulness of insulin and names of people she  "knows   Hematological: Negative for adenopathy. Does not bruise/bleed easily.   Psychiatric/Behavioral: Negative for agitation, confusion and hallucinations. The patient is nervous/anxious.      OBJECTIVE:    Vitals:    07/07/21 1458   BP: 163/90   Pulse: 79   Resp: 18   Temp: 97.5 °F (36.4 °C)   Weight: 73.6 kg (162 lb 3.2 oz)   Height: 154.9 cm (61\")     Body mass index is 30.65 kg/m².    ECOG  (1) Restricted in physically strenuous activity, ambulatory and able to do work of light nature    Physical Exam  Vitals and nursing note reviewed.   Constitutional:       General: She is not in acute distress.     Appearance: Normal appearance. She is not diaphoretic.   HENT:      Head: Normocephalic and atraumatic.      Mouth/Throat:      Mouth: Mucous membranes are moist.      Pharynx: Oropharynx is clear.   Eyes:      General: No scleral icterus.        Right eye: No discharge.         Left eye: No discharge.      Extraocular Movements: Extraocular movements intact.      Conjunctiva/sclera: Conjunctivae normal.   Neck:      Thyroid: No thyromegaly.   Cardiovascular:      Rate and Rhythm: Normal rate and regular rhythm.      Pulses: Normal pulses.      Heart sounds: Normal heart sounds. No friction rub. No gallop.    Pulmonary:      Effort: Pulmonary effort is normal. No respiratory distress.      Breath sounds: Normal breath sounds. No stridor. No wheezing.   Abdominal:      General: Bowel sounds are normal. There is distension.      Palpations: Abdomen is soft. There is no mass.      Tenderness: There is abdominal tenderness. There is guarding (Left upper abdomen). There is no rebound.   Musculoskeletal:         General: No tenderness. Normal range of motion.      Cervical back: Normal range of motion and neck supple.      Right lower leg: No edema.      Left lower leg: No edema.      Comments: Neck pain.  Patient has a neck collar.   Lymphadenopathy:      Cervical: No cervical adenopathy.   Skin:     General: Skin is " warm and dry.      Capillary Refill: Capillary refill takes less than 2 seconds.      Findings: No bruising, erythema or rash.   Neurological:      Mental Status: She is alert and oriented to person, place, and time.      Cranial Nerves: No cranial nerve deficit.      Sensory: No sensory deficit.      Motor: No abnormal muscle tone.   Psychiatric:         Mood and Affect: Mood normal.         Behavior: Behavior normal.         Thought Content: Thought content normal.         Judgment: Judgment normal.     I have reexamined the patient and the results are consistent with the previously documented exam. CIRO Davenport     RECENT LABS    WBC   Date Value Ref Range Status   07/06/2021 2.00 (L) 3.40 - 10.80 10*3/mm3 Final     RBC   Date Value Ref Range Status   07/06/2021 3.86 3.77 - 5.28 10*6/mm3 Final     Hemoglobin   Date Value Ref Range Status   07/06/2021 11.2 (L) 12.0 - 15.9 g/dL Final     Hematocrit   Date Value Ref Range Status   07/06/2021 34.0 34.0 - 46.6 % Final     MCV   Date Value Ref Range Status   07/06/2021 88.2 79.0 - 97.0 fL Final     MCH   Date Value Ref Range Status   07/06/2021 29.1 26.6 - 33.0 pg Final     MCHC   Date Value Ref Range Status   07/06/2021 33.0 31.5 - 35.7 g/dL Final     RDW   Date Value Ref Range Status   07/06/2021 18.6 (H) 12.3 - 15.4 % Final     RDW-SD   Date Value Ref Range Status   07/06/2021 56.0 (H) 37.0 - 54.0 fl Final     MPV   Date Value Ref Range Status   07/06/2021 7.9 6.0 - 12.0 fL Final     Platelets   Date Value Ref Range Status   07/06/2021 97 (L) 140 - 450 10*3/mm3 Final     Neutrophil %   Date Value Ref Range Status   07/06/2021 55.3 42.7 - 76.0 % Final     Lymphocyte %   Date Value Ref Range Status   07/06/2021 36.5 19.6 - 45.3 % Final     Monocyte %   Date Value Ref Range Status   07/06/2021 7.1 5.0 - 12.0 % Final     Eosinophil %   Date Value Ref Range Status   07/06/2021 0.4 0.3 - 6.2 % Final     Basophil %   Date Value Ref Range Status   07/06/2021 0.7  0.0 - 1.5 % Final     Immature Grans %   Date Value Ref Range Status   07/20/2020 0.7 (H) 0.0 - 0.5 % Final     Neutrophils, Absolute   Date Value Ref Range Status   07/06/2021 1.10 (L) 1.70 - 7.00 10*3/mm3 Final     Lymphocytes, Absolute   Date Value Ref Range Status   07/06/2021 0.70 0.70 - 3.10 10*3/mm3 Final     Monocytes, Absolute   Date Value Ref Range Status   07/06/2021 0.10 0.10 - 0.90 10*3/mm3 Final     Eosinophils, Absolute   Date Value Ref Range Status   07/06/2021 0.00 0.00 - 0.40 10*3/mm3 Final     Basophils, Absolute   Date Value Ref Range Status   07/06/2021 0.00 0.00 - 0.20 10*3/mm3 Final     Immature Grans, Absolute   Date Value Ref Range Status   07/20/2020 0.05 0.00 - 0.05 10*3/mm3 Final     nRBC   Date Value Ref Range Status   07/06/2021 0.6 (H) 0.0 - 0.2 /100 WBC Final       Lab Results   Component Value Date    GLUCOSE 178 (H) 07/06/2021    BUN 14 07/06/2021    CREATININE 0.79 07/06/2021    EGFRIFNONA 75 07/06/2021    EGFRIFAFRI >60 10/12/2018    BCR 17.7 07/06/2021    K 4.7 07/06/2021    CO2 23.0 07/06/2021    CALCIUM 9.0 07/06/2021    PROTENTOTREF 7.4 12/14/2020    ALBUMIN 4.40 07/06/2021    LABIL2 0.9 12/14/2020    AST 18 07/06/2021    ALT 14 07/06/2021         Assessment/Plan     Malignant neoplasm of female breast, unspecified estrogen receptor status, unspecified laterality, unspecified site of breast (CMS/HCC)  - CT Head With Contrast  - CT Abdomen Pelvis With Contrast  - Ambulatory Referral to Radiation Oncology      ASSESSMENT:    · Metastatic breast cancer presenting as 1.1 cm mixed lytic/sclerotic hypermetabolic lesion in the left hemisacrum suspicious for metastatic disease 1.2 cm sclerotic lesion on L4, small L3 lesion and T11 lesion.  Tumor is ER positive, MT positive and HER-2/bishop negative.  Patient is currently on aromatase inhibitor, I have therefore recommended addition of CDK 4 inhibitor with Ibrance.  She will also benefit from biphosphonate therapy to prevent skeletal  fractures.  Will discontinue Prolia and begin Xgeva as well.  Reviewed the side effects of Ibrance in detail with patient to include but not limited to fatigue, hepatic function abnormalities, interstitial lung disease, pancytopenia.  She is currently on combination of Ibrance, Arimidex and Xgeva to be started once her dental procedure has been completed  · Tooth ache probable secondary to infection.  Patient has appointment with dentist coming up.  She is currently on antibiotics.  · Pancytopenia secondary to Ibrance: Continue to monitor her counts very closely  · ypT2 N1 aM0 status post left modified radical mastectomy with lymph node dissection and right prophylactic mastectomy in 2017 performed at [facility].  ER positive, WA positive and HER-2/bishop negative.  Status post bilateral chest wall reconstruction.  According to patient, she tolerated Arimidex very well except that she also had osteoporosis therefore Arimidex was discontinued at that time  · Intolerance of tamoxifen in the past  · Osteoporosis: On Prolia: We will transition to Xgeva since she has bone metastases  · Status post neoadjuvant Arimidex prior to bilateral mastectomies  · Thrombocytopenia: Work-up was - December 2020  · Neck pain status post cervical spine surgery: Patient has a soft neck collar  · Complex cardiac medical history including tetralogy of Fallot, coarctation of aorta, VSD status post repair.  Status post stent placement for coarctation of aorta  · Personal history and strong family history of breast cancer in multiple in the relatives on paternal side of the family including 4 paternal aunts in their 30s and 40s and 2 maternal aunts at age of 20s and 50s.  There is concern for possible hereditary breast cancer syndrome.  Patient may be  interested in pursuing cancer genetics for her management.  · Assessment has been reviewed and updated  · LUQ pain: 8 of 10 while sitting, 9/10 when moving or  inspiration.  · Questionable bone mets on CT chest 7/6/21: radiation referral for pain  · Forgetfulness and Dizziness: forgets names and insulin medication with increasing incidence, CT head  · Nausea: Continue compazine at home prn        Discussion    Patient has metastatic breast cancer estrogen and progesterone dependent and HER-2/bishop negative.  She is currently on Arimidex.  We will add Ibrance.  We will also discontinue Prolia and begin Xgeva to help reduce skeletal events.           Plans:     · Continue antibiotics for dental infection  · Follow-up with dentist as soon as possible  · Hold Xgeva until all dental procedures have been completed and she has had a follow-up visit   · Continue Arimidex, Ibrance  · Reviewed path report  · Reviewed CMP, CBC, CEA, CA 15-3, CA 27.29.  · Discontinued Ultram 100 mg p.o. every 12 as needed for pain due to heartburn and begin Percocet 5 mg p.o. every 4-6 hours as needed for pain number 40 tablets  · Reviewed work-up for thrombocytopenia which was negative  · Reviewed her bone density which showed osteoporosis  · Hold Xgeva 120 mg subcu monthly Xgeva has not been started yet: dental work not completed yet  · Continue Arimidex 1 mg p.o. daily  · Continue Os-Chetan D 600 mg tablets twice a day  · Thrombocytopenia work-up reviewed with patient and negative  · She cannot tolerate tamoxifen.  Explained to patient that we can still treat her with Arimidex    · All questions answered  · Follow-up in 4 weeks or earlier as needed for problems  · Information on Ibrance was given to patient and her spouse who is present today: tomorrow is last day of current cycle of Ibrance, finish this cycle  · CT head: worsening forgetfulness, dizziness, forgetting insulin medication  · CT abdomen/pelvis: LUQ pain  · Celebrex 200 mg q AM for pain  · Percocet 1 tab q PM for pain  · Lidocaine patch to LUQ for pain relief  · Refer to radiation  · Continue compazine for nausea  · Return to see NP end of  next week with scans      Discussion:   The patient presents with complaints of LUQ pain 8/10 sitting and 9/10 with movement of inspiration, or sneezing.  The patient states she has had a broken rib on the left in the past.  CT chest 7/6/2021 in the ER shows no abnormality in left ribs.  The patient also has 8 of 10 back pain.  The CT chest 7/6/2021 in the ER shows questionable metastatic activity in the spine and possibly right ribs.  The patient was agreeable for a referral to radiation for possible radiation for pain management.  The patient does not like to take narcotic medication.  Therefore she takes half of a tablet of Percocet at night for pain and she takes 2 tablets Marilu aspirins during the day for pain.  The patient verbalizes becoming very upset with her  when he approaches her about taking pain medication.  Discussed with the patient the need for pain control and using nonnarcotic forms of pain control.  The patient is agreeable to take Celebrex in the morning, 2 Marilu aspirin tablets in the afternoon and to upgrade to 1 whole Percocet at night for pain.  She will also apply a lidocaine patch to LUQ for attempted pain relief.  The patient is very upset that her pain was not addressed in recent ER visit. She is very anxious about family dynamics.  She denies suicidal ideation.  Patient refuses idea of anxiety medication.  The patient continues to struggle with episodes of nausea and is taking Compazine at home.  She wants to remain with only Compazine at this time.  The patient also complains of increasing forgetfulness of names and forgetting to take her insulin medication as well as dizziness that has increased.  Discussed with the patient about restricted activity with right upper quadrant pain for the next few weeks and the patient is agreeable.  She verbalizes that she will also try heating pad on her back and her LUQ.  The patient is agreement with the plan and will return to see the NP in  1 week after scans.      Reviewed scans and labs from 7/6/21 in appt.        Patient verbalized understanding and is in agreement of the above plan.       Electronically signed by CIRO Davenport, 07/07/21, 5:25 PM EDT.          I spent 45 total minutes, face-to-face, caring for Gladys ross.  90% of this time involved counseling and/or coordination of care as documented within this note.

## 2021-07-08 ENCOUNTER — CONSULT (OUTPATIENT)
Dept: RADIATION ONCOLOGY | Facility: HOSPITAL | Age: 61
End: 2021-07-08

## 2021-07-08 VITALS
DIASTOLIC BLOOD PRESSURE: 93 MMHG | SYSTOLIC BLOOD PRESSURE: 174 MMHG | WEIGHT: 162 LBS | OXYGEN SATURATION: 99 % | RESPIRATION RATE: 16 BRPM | HEART RATE: 77 BPM | BODY MASS INDEX: 30.61 KG/M2

## 2021-07-08 DIAGNOSIS — C50.919 CARCINOMA OF BREAST METASTATIC TO BONE, UNSPECIFIED LATERALITY (HCC): Primary | ICD-10-CM

## 2021-07-08 DIAGNOSIS — C79.51 CARCINOMA OF BREAST METASTATIC TO BONE, UNSPECIFIED LATERALITY (HCC): Primary | ICD-10-CM

## 2021-07-08 LAB — QT INTERVAL: 502 MS

## 2021-07-08 PROCEDURE — 77334 RADIATION TREATMENT AID(S): CPT | Performed by: RADIOLOGY

## 2021-07-08 PROCEDURE — 99203 OFFICE O/P NEW LOW 30 MIN: CPT | Performed by: RADIOLOGY

## 2021-07-08 NOTE — PROGRESS NOTES
"Radiation Oncology Consult Note    Name: Gladys Garrison  YOB: 1960  MRN #: [id number]  Date of Service: 7/8/2021  Referring Provider: Fidelina Rosa APRN  [address] [facility] [address]  [facility]  [place] [address]  Primary Care Provider: Kourtney Coffman APRN    DIAGNOSIS: Stage IV breast cancer  Encounter Diagnosis   Name Primary?   • Carcinoma of breast metastatic to bone, unspecified laterality (CMS/HCC) Yes       REASON FOR CONSULTATION/CHIEF COMPLAINT:  \"Left rib pain\"  I was asked to see the patient at the request of the referring provider noted below for advice and recommendations regarding this diagnosis and the role of radiation therapy.                              REQUESTING PHYSICIAN:  Fidelina Rosa Aprn  [address] [facility] [address]  [facility]  [place] [address]    RECORDS OBTAINED:  Records of the patients history including those obtained from the referring provider were reviewed and summarized in detail.    HISTORY OF PRESENT ILLNESS:  Gladys Garrison is a 60 y.o. female who has multiple comorbidities including congenital abnormalities such as hypoplastic kidney, tetralogy of Fallot, coarctation of the aorta and congenital VSD status post repair.  In 2017, Patient developed syncopal episode and for that reason she had she had a CT scan of the chest which showed mass in the left breast.  She had diagnostic mammogram and ultrasound which showed a 2 cm spiculated mass in the left breast at the 1 o'clock position 6 cm from the nipple.  Biopsy of the left breast mass revealed invasive moderately differentiated carcinoma ER/TN positive and HER-2/bishop negative.  Patient also had an ultrasound of the axilla which was concerning for an abnormal left axillary lymph node with cortical thickening.  She apparently had an FNA of the left axillary nodule which was positive for malignancy.  On 2/2/2017 patient underwent left modified radical mastectomy, right prophylactic mastectomy and immediate breast " reconstruction.  She also underwent right prophylactic mastectomy.  We have had her on records suggest that patient did have multifocal disease with pT2 pN1 aM0.  The largest focus measured 3.5 cm.  2 of 11 lymph nodes were positive for metastatic disease some with extracapsular extension.  Notes that patient did receive Arimidex neoadjuvant  from October 2017 to March 2018 prior to her bilateral mastectomies.     Review of her note suggest that she developed cough which she attributed to anastrozole and patient was then placed on tamoxifen.  She had MammaPrint testing which returned with low risk for relapse.     Her postop course was also complicated by left chest wall abscess which resulted in I&D and removal of the left tissue expander. She was referred for radiation treatment but ultimately declined.     Patient was then placed on tamoxifen in April 2018 which she stopped after less than a month due to nausea and vomiting.  Patient in the interim also was diagnosed with chronic hepatitis C was seen by the hepatologist.  Tamoxifen was dose reduced to 10 mg daily with the goal to increase to 20 mg daily.      Patient has relocated to [facility].  She at that time was not currently on any antiestrogen therapy.        · 1/29/2021 patient had bone density which showed osteoporosis  · Patient was seen by neurosurgery and had a bone scan done in March 2021 which showed subtle activity in the inferior L4 vertebral body appears to correlate with new area of sclerosis on CT of the abdomen and pelvis.  There is also subtle activity with possible new lesion at the posterior left sacrum.  These findings were concerning for metastatic disease.  PET CT scan was recommended to further evaluate.  · 4/8/2021 patient had a PET CT scan which showed evidence of disease in the neck chest abdomen and pelvis.  But she has a 1.1 cm mixed lytic/sclerotic hypermetabolic lesion within the left hemisacrum consistent with metastatic  disease.  There is also accumulation within the inferior endplate of the L4 vertebral body thought to correspond to 1.2 centimeters sclerotic lesion consistent with metastatic disease.  There is a tiny hypermetabolic focus within the L3, T11.  Suspicious for also early metastatic disease.  4/26/2021 patient underwent CT-guided biopsy of sacral lesion, pathology was consistent with metastatic breast cancer ER and KS positive HER-2/bishop was negative.      Ibrance was started early 5/2021,  Starting 3 month, first week of therapy in 2 weeks (will labs before).    Interval,  patient thought she broke rib on her anterior L medial chest/rib.  She did not have a trauma but feels sharp pain, focused over the lateral ribs.  This area was not evident on recent scans she was told but continues to get worse.      She has had daily low back and hip pain that is low grade---has not been taking pain medications.   She does take 1 pain pill per night.  She is struggling most with the rib pain.    The following portions of the patient's history were reviewed and updated as appropriate: allergies, current medications, past family history, past medical history, past social history, past surgical history and problem list. Reviewed with the patient and remain unchanged.    PAST MEDICAL HISTORY:  she  has a past medical history of Allergic, Breast cancer (CMS/HCC) (2017), Cancer of unknown origin (CMS/HCC), Compression fx, thoracic spine, open, initial encounter (CMS/HCC), Coronary artery disease, Diabetes mellitus (CMS/HCC), Heart disease, unspecified, Hepatitis C, Hypertension, Obesity (BMI 30-39.9) (2/5/2021), Sleep apnea, and Type 2 diabetes mellitus (CMS/HCC) (11/2017).  MEDICATIONS:   Current Outpatient Medications:   •  Accu-Chek FastClix Lancets [facility], For finger stick 4x/d, Disp: 100 each, Rfl: 12  •  Alcohol Swabs 70 % pads, Use tid, Disp: 300 each, Rfl: 2  •  anastrozole (ARIMIDEX) 1 MG tablet, Take 1 tablet by mouth Daily.,  "Disp: 30 tablet, Rfl: 6  •  aspirin 81 MG chewable tablet, Chew 1 tablet Daily. (Patient taking differently: Chew 81 mg Daily. Hold DOS), Disp: 30 tablet, Rfl: 1  •  Blood Glucose Monitoring Suppl (ACCU-CHEK TEODORO PLUS) w/Device kit, Use as directed   DX  E11.9, Disp: 1 kit, Rfl: 0  •  Calcium Carbonate-Vit D-Min (Calcium 600+D Plus Minerals) 600-400 MG-UNIT chewable tablet, Chew 600 mg 2 (Two) Times a Day., Disp: 60 each, Rfl: 6  •  celecoxib (CeleBREX) 200 MG capsule, Take 1 capsule by mouth Daily. Take one tablet in the am, Disp: 20 capsule, Rfl: 1  •  Continuous Blood Gluc Sensor (StockdriftStyle Rajeev 14 Day Sensor) misc, 1 each Every 14 (Fourteen) Days., Disp: 6 each, Rfl: 1  •  cyclobenzaprine (FLEXERIL) 10 MG tablet, Take 1 tablet by mouth 3 (Three) Times a Day As Needed for Muscle Spasms., Disp: 90 tablet, Rfl: 0  •  glucose blood (Accu-Chek Teodoro Plus) test strip, To check blood sugar 4x/d, Disp: 150 each, Rfl: 12  •  insulin NPH-insulin regular (humuLIN 70/30,novoLIN 70/30) (70-30) 100 UNIT/ML injection, Inject 40 Units under the skin into the appropriate area as directed Every Morning., Disp: , Rfl:   •  insulin NPH-insulin regular (humuLIN 70/30,novoLIN 70/30) (70-30) 100 UNIT/ML injection, Inject 30 Units under the skin into the appropriate area as directed Every Evening., Disp: , Rfl:   •  insulin NPH-insulin regular (NovoLIN 70/30) (70-30) 100 UNIT/ML injection, Inject  under the skin into the appropriate area as directed., Disp: , Rfl:   •  Insulin Syringe 31G X 5/16\" 1 ML misc, 1 syringe 2 (Two) Times a Day., Disp: 100 each, Rfl: 1  •  lidocaine (LIDODERM) 5 %, Place 1 patch on the skin as directed by provider Daily. Remove & Discard patch within 12 hours or as directed by MD, Disp: 10 patch, Rfl: 2  •  lisinopril (PRINIVIL,ZESTRIL) 20 MG tablet, Take 20 mg by mouth Daily., Disp: , Rfl:   •  ondansetron (ZOFRAN) 8 MG tablet, Take 1 tablet by mouth 3 (Three) Times a Day As Needed for Nausea or Vomiting., " Disp: 30 tablet, Rfl: 5  •  oxyCODONE-acetaminophen (PERCOCET) 5-325 MG per tablet, Take 1 tablet by mouth Take As Directed. Take 1 tablet by mouth every 4-6 hours prn pain, Disp: 40 tablet, Rfl: 0  •  Palbociclib 125 MG tablet, Take 125 mg by mouth Daily., Disp: , Rfl:   •  prochlorperazine (COMPAZINE) 10 MG tablet, Take 1 tablet by mouth Every 6 (Six) Hours As Needed for Nausea or Vomiting., Disp: 90 tablet, Rfl: 1  •  rosuvastatin (Crestor) 20 MG tablet, Take 1 tablet by mouth Every Night., Disp: 90 tablet, Rfl: 0  ALLERGIES:   Allergies   Allergen Reactions   • Promethazine Mental Status Change   • Tape Other (See Comments)     .blisters       PAST SURGICAL HISTORY: she has a past surgical history that includes Cardiac surgery;  section; Neck surgery; Knee surgery (); Cardiac Ablation; Pacemaker Implantation; Mastectomy (Bilateral); Breast reconstruction (Bilateral); Cardiac surgery; Back surgery; cervical fusion anterior with artificial discectomy implantation (N/A, 2020); Cardiac catheterization; Esophagogastroduodenoscopy (N/A, 10/23/2020); and Colonoscopy (N/A, 10/23/2020).  PREVIOUS RADIOTHERAPY OR CHEMOTHERAPY: No XRT, Ibrance has recently started.  FAMILY HISTORY: her family history includes Aneurysm in her father; Cancer in her maternal aunt; Diabetes in her sister; Diabetes type I in her half-sister; Heart attack in her sister; Heart disease in her mother; Lung cancer in her mother; No Known Problems in her brother, brother, and sister; Stroke in her mother; Thyroid cancer in her half-sister; Thyroid disease in her sister.  SOCIAL HISTORY: she  reports that she has never smoked. She has never used smokeless tobacco. She reports current alcohol use of about 1.0 standard drinks of alcohol per week. She reports previous drug use.  PAIN AND PAIN MANAGEMENT: Gladys NOBLES Bladimir reports a pain score of 8.  Given her pain assessment as noted, treatment options were discussed and the following  options were decided upon as a follow-up plan to address the patient's pain: continuation of current treatment plan for pain.    Vitals:    07/08/21 1511   BP: 174/93   Pulse: 77   Resp: 16   SpO2: 99%   Weight: 73.5 kg (162 lb)   PainSc:   8   PainLoc: Comment: Lower back, left lateral chest, both hips     NUTRITIONAL STATUS:   no issues  KPS: 80:  Normal activity with effort; some signs or symptoms    PHQ-9 Total Score: negative distress screening tool    Review of Systems:   Review of Systems   Gastrointestinal: Positive for constipation.   Musculoskeletal: Positive for back pain.   Psychiatric/Behavioral: The patient is nervous/anxious.       General: No fevers, chills, weight change, or drenching night sweats. Skin: No rashes or jaundice.  HEENT: No change in vision or hearing, no headaches.  Neck: No dysphagia or masses.  Heme/Lymph: No easy bruising or bleeding.  Respiratory System: No shortness of breath or cough.  Cardiovascular: No chest pain, palpitations, or dyspnea on exertion.  - Pacemaker. GI: No nausea, vomiting, diarrhea, melena, or hematochezia.  : No dysuria or hematuria.  Endocrine: No heat or cold intolerance. Musculoskeletal: + Left rib pain.  Neuro: No weakness, numbness, syncope, or seizures. Psych: No mood changes or depression. Ext: Denies swelling.        Objective     Vitals:  Vitals:    07/08/21 1511   BP: 174/93   Pulse: 77   Resp: 16   SpO2: 99%   Weight: 73.5 kg (162 lb)   PainSc:   8   PainLoc: Comment: Lower back, left lateral chest, both hips         PHYSICAL EXAM:  GENERAL: in no apparent distress, sitting comfortably in room.    HEENT: normocephalic, atraumatic. Pupils are equal, round, reactive to light. Sclera anicteric. Conjunctiva not injected. Oropharynx without erythema, ulcerations or thrush.   NECK: Supple with no masses.  LYMPHATIC: no cervical, supraclavicular or axillary adenopathy appreciated bilaterally.   CARDIOVASCULAR: S1 & S2 detected; no murmurs, rubs or  gallops.  CHEST: clear to auscultation bilaterally; no wheezes, crackles or rubs. Work of breathing normal.  ABDOMEN: bowel sounds present. Abdomen is soft, nontender, nondistended.   MUSCULOSKELETAL: no tenderness to palpation along the spine or scapulae. Normal range of motion. Point tenderness lateral ribs  EXTREMITIES: no clubbing, cyanosis, edema.  SKIN: no erythema, rashes, ulcerations noted.   NEUROLOGIC: cranial nerves II-XII grossly intact bilaterally. No focal neurologic deficits.  PSYCHIATRIC:  alert, aware, and appropriate.      PERTINENT IMAGING/PATHOLOGY/LABS (Medical Decision Making):     COORDINATION OF CARE: A copy of this note is sent to the referring provider.    PATHOLOGY (Reviewed):     IMAGING (Reviewed): CT chest 07/06/2021-- Old left rib fracture (not in region of pain). New sclerosis within the right 12th rib posterior, new sclerosis in the right eighth rib laterally (corresponding to bone scan from 03/12/2021), new scleorsis within the right T8 transverse process worrisome for metastatic disease progression.  Old T9 and T12 superior endplate compression  -No convincing evidence of acute left rib deformity or left rib osseous metastatic disease.    LABS (Reviewed):  Hematology WBC   Date Value Ref Range Status   07/06/2021 2.00 (L) 3.40 - 10.80 10*3/mm3 Final     RBC   Date Value Ref Range Status   07/06/2021 3.86 3.77 - 5.28 10*6/mm3 Final     Hemoglobin   Date Value Ref Range Status   07/06/2021 11.2 (L) 12.0 - 15.9 g/dL Final     Hematocrit   Date Value Ref Range Status   07/06/2021 34.0 34.0 - 46.6 % Final     Platelets   Date Value Ref Range Status   07/06/2021 97 (L) 140 - 450 10*3/mm3 Final      Chemistry   Lab Results   Component Value Date    GLUCOSE 178 (H) 07/06/2021    BUN 14 07/06/2021    CREATININE 0.79 07/06/2021    EGFRIFNONA 75 07/06/2021    EGFRIFAFRI >60 10/12/2018    BCR 17.7 07/06/2021    K 4.7 07/06/2021    CO2 23.0 07/06/2021    CALCIUM 9.0 07/06/2021    PROTENTOTREF  7.4 12/14/2020    ALBUMIN 4.40 07/06/2021    LABIL2 0.9 12/14/2020    AST 18 07/06/2021    ALT 14 07/06/2021       Assessment/Plan     ASSESSMENT AND PLAN:  1. Carcinoma of breast metastatic to bone, unspecified laterality (CMS/HCC)       -Patient is highly concerned about L rib metastatic disease.   -Imaging has been negative on the L; bone scan 3/2021, PET/CT 4/2021 and most recently 07/2021 CT chest.    Point tenderness on exam is worrisome for potential disease.    CT sim with BBs at area of pain.  Will review for target with radiology.    This assessment comes from my review of the imaging, pathology, physician notes and other pertinent information as mentioned.    DISPOSITION: CT sim      TIME SPENT WITH PATIENT:   I spent 30 minutes caring for Gladys on this date of service. This time includes time spent by me in the following activities: preparing for the visit, reviewing tests, obtaining and/or reviewing a separately obtained history, performing a medically appropriate examination and/or evaluation, counseling and educating the patient/family/caregiver, ordering medications, tests, or procedures, documenting information in the medical record, independently interpreting results and communicating that information with the patient/family/caregiver and care coordination           CC: Fidelina Rosa, Kourtney Ross, CIRO Laguerre MD  7/8/2021  3:27 PM EDT    Addendum: Pain  At CT, per BB localization, was between L 7th and 8th lateral portion of ribs. No pathologic lesions or targets for radiation therapy seen in this level to expect palliative therapy to be of benefit.  Discussed with med/onc and can try medrol dose pack and Fidelina Rosa will follow and refer back as indicated.  Patient elected against medrol dosepak on phone call today.    Approved by:     Christ Laguerre MD  07/13/21  09:14 EDT

## 2021-07-09 ENCOUNTER — MEDICATION THERAPY MANAGEMENT (OUTPATIENT)
Dept: PHARMACY | Facility: HOSPITAL | Age: 61
End: 2021-07-09

## 2021-07-09 ENCOUNTER — HOSPITAL ENCOUNTER (OUTPATIENT)
Dept: RADIATION ONCOLOGY | Facility: HOSPITAL | Age: 61
Setting detail: RADIATION/ONCOLOGY SERIES
End: 2021-07-09

## 2021-07-09 NOTE — PROGRESS NOTES
MTM Note: Ibrance        7/6/2021   WBC 3.40 - 10.80 10*3/mm3 2.00 (A)   Neutrophils Absolute 1.70 - 7.00 10*3/mm3 1.10 (A)   Hemoglobin 12.0 - 15.9 g/dL 11.2 (A)   Hematocrit 34.0 - 46.6 % 34.0   Platelets 140 - 450 10*3/mm3 97 (A)   Creatinine 0.57 - 1.00 mg/dL 0.79   eGFR Non African Am >60 mL/min/1.73 75   BUN 6 - 20 mg/dL 14   Sodium 136 - 145 mmol/L 139   Potassium 3.5 - 5.2 mmol/L 4.7  Slight hemolysis detected by analyzer. Results may be affected.   Glucose 65 - 99 mg/dL 178 (A)   Calcium 8.6 - 10.5 mg/dL 9.0   Albumin 3.50 - 5.20 g/dL 4.40   Total Protein 6.0 - 8.5 g/dL 7.2   AST (SGOT) 1 - 32 U/L 18   ALT (SGPT) 1 - 33 U/L 14   Alkaline Phosphatase 39 - 117 U/L 59   Total Bilirubin 0.0 - 1.2 mg/dL 0.4     Per NP dictation 7/7/21, continue Ibrance.  Patient is being evaluated by rad onc also.  Patient is having pain but doesn't want narcotics.  NP prescribed Celebrex and lidoderm patch.  Pharmacy will continue to follow.  Thanks,    Eda Her, PharmD

## 2021-07-12 ENCOUNTER — TELEPHONE (OUTPATIENT)
Dept: ONCOLOGY | Facility: HOSPITAL | Age: 61
End: 2021-07-12

## 2021-07-12 NOTE — TELEPHONE ENCOUNTER
Distress Screening   Patient Name: Gladys Garrison  YOB: 1960  MRN #: [id number]    Patient completed distress screenin21  Distress Level: 4/10  Problems indicated: housing, dealing with partner, physical complaints     OSW called patient and asked about distress, housing and dealing with partner. She said her  is sad all the time. She said she was waiting for a call from the [facility]SA office and wanted to call OSW back. OSW encouraged her to call back and told her a packet of information with ida information would be sent out.     Referrals: Welcome letter, oncology, community and transportation resources mailed to patient.     Electronically signed by:   Kita Falcon LCSW, OSW-C  21, 10:31 EDT

## 2021-07-15 ENCOUNTER — TELEPHONE (OUTPATIENT)
Dept: ONCOLOGY | Facility: CLINIC | Age: 61
End: 2021-07-15

## 2021-07-15 DIAGNOSIS — G89.3 CANCER ASSOCIATED PAIN: ICD-10-CM

## 2021-07-15 RX ORDER — OXYCODONE HYDROCHLORIDE AND ACETAMINOPHEN 5; 325 MG/1; MG/1
1 TABLET ORAL TAKE AS DIRECTED
Qty: 40 TABLET | Refills: 0 | Status: SHIPPED | OUTPATIENT
Start: 2021-07-15 | End: 2021-09-01 | Stop reason: SDUPTHER

## 2021-07-15 RX ORDER — OXYCODONE HYDROCHLORIDE AND ACETAMINOPHEN 5; 325 MG/1; MG/1
1 TABLET ORAL TAKE AS DIRECTED
Qty: 40 TABLET | Refills: 0 | Status: CANCELLED | OUTPATIENT
Start: 2021-07-15

## 2021-07-15 NOTE — TELEPHONE ENCOUNTER
Called pt and left voice message regarding tomorrow's appointment with Fidelina PAPPAS. Pt to have CT scan prior to seeing Fidelina PAPPAS. Since pt has not had CT yet, it's not necessary for her to come in for tomorrow's appointment.

## 2021-07-15 NOTE — TELEPHONE ENCOUNTER
Caller: GarrisonGladys    Relationship: Self    Best call back number: [phone]    Medication needed:   Requested Prescriptions     Pending Prescriptions Disp Refills   • oxyCODONE-acetaminophen (PERCOCET) 5-325 MG per tablet 40 tablet 0     Sig: Take 1 tablet by mouth Take As Directed. Take 1 tablet by mouth every 4-6 hours prn pain       When do you need the refill by: ASAP    Does the patient have less than a 3 day supply:  [x] Yes  [] No    What is the patient's preferred pharmacy: [facility] [place] - [address]  - [phone] [facility] [phone] FX           PATIENT IS TO START IBRANCE TOMORROW & HAS NOT HEARD FROM OFFICE TO STATE IF YOU ALL HAVE RCV'D IT, PLEASE CALL HER ASAP.

## 2021-07-16 ENCOUNTER — TELEPHONE (OUTPATIENT)
Dept: ONCOLOGY | Facility: CLINIC | Age: 61
End: 2021-07-16

## 2021-07-16 ENCOUNTER — APPOINTMENT (OUTPATIENT)
Dept: LAB | Facility: HOSPITAL | Age: 61
End: 2021-07-16

## 2021-07-16 NOTE — TELEPHONE ENCOUNTER
Received call from Kim Trevino RN.  She states this patient has not had CT scans performed yet and per Fidelina Rosa NP f/u appt needs to be rescheduled after CT scans performed.  Attempted to contact patient but had to LMV asking her to call back.  Attempted to contact spouse but had to LMV asking him to call back.

## 2021-07-23 ENCOUNTER — APPOINTMENT (OUTPATIENT)
Dept: CT IMAGING | Facility: HOSPITAL | Age: 61
End: 2021-07-23

## 2021-07-23 ENCOUNTER — HOSPITAL ENCOUNTER (EMERGENCY)
Facility: HOSPITAL | Age: 61
Discharge: HOME | End: 2021-07-23
Attending: EMERGENCY MEDICINE | Admitting: EMERGENCY MEDICINE

## 2021-07-23 VITALS
OXYGEN SATURATION: 94 % | SYSTOLIC BLOOD PRESSURE: 142 MMHG | RESPIRATION RATE: 16 BRPM | DIASTOLIC BLOOD PRESSURE: 79 MMHG | WEIGHT: 160 LBS | TEMPERATURE: 98.6 F | HEIGHT: 60 IN | HEART RATE: 74 BPM | BODY MASS INDEX: 31.41 KG/M2

## 2021-07-23 DIAGNOSIS — S22.41XA CLOSED FRACTURE OF MULTIPLE RIBS OF RIGHT SIDE, INITIAL ENCOUNTER: Primary | ICD-10-CM

## 2021-07-23 PROCEDURE — 25010000002 ONDANSETRON PER 1 MG: Performed by: EMERGENCY MEDICINE

## 2021-07-23 PROCEDURE — 99283 EMERGENCY DEPT VISIT LOW MDM: CPT

## 2021-07-23 PROCEDURE — 96374 THER/PROPH/DIAG INJ IV PUSH: CPT

## 2021-07-23 PROCEDURE — 71250 CT THORAX DX C-: CPT

## 2021-07-23 PROCEDURE — 25010000002 HYDROMORPHONE PER 4 MG: Performed by: EMERGENCY MEDICINE

## 2021-07-23 PROCEDURE — 96375 TX/PRO/DX INJ NEW DRUG ADDON: CPT

## 2021-07-23 RX ORDER — OXYCODONE AND ACETAMINOPHEN 10; 325 MG/1; MG/1
1 TABLET ORAL EVERY 6 HOURS PRN
Qty: 20 TABLET | Refills: 0 | Status: SHIPPED | OUTPATIENT
Start: 2021-07-23 | End: 2021-08-16 | Stop reason: SDUPTHER

## 2021-07-23 RX ORDER — SODIUM CHLORIDE 0.9 % (FLUSH) 0.9 %
10 SYRINGE (ML) INJECTION AS NEEDED
Status: DISCONTINUED | OUTPATIENT
Start: 2021-07-23 | End: 2021-07-24 | Stop reason: HOSPADM

## 2021-07-23 RX ORDER — HYDROMORPHONE HCL 110MG/55ML
1 PATIENT CONTROLLED ANALGESIA SYRINGE INTRAVENOUS ONCE
Status: COMPLETED | OUTPATIENT
Start: 2021-07-23 | End: 2021-07-23

## 2021-07-23 RX ORDER — ONDANSETRON 2 MG/ML
8 INJECTION INTRAMUSCULAR; INTRAVENOUS ONCE
Status: COMPLETED | OUTPATIENT
Start: 2021-07-23 | End: 2021-07-23

## 2021-07-23 RX ADMIN — HYDROMORPHONE HYDROCHLORIDE 1 MG: 2 INJECTION, SOLUTION INTRAMUSCULAR; INTRAVENOUS; SUBCUTANEOUS at 21:00

## 2021-07-23 RX ADMIN — ONDANSETRON 8 MG: 2 INJECTION INTRAMUSCULAR; INTRAVENOUS at 21:00

## 2021-07-24 NOTE — DISCHARGE INSTRUCTIONS
Use Percocet 3-4 times a day for pain.  Increase your activity as tolerated.  Follow-up with your doctor on Monday.  Call or return if you have increasing trouble with breathing or if you start coughing up blood.

## 2021-07-24 NOTE — ED PROVIDER NOTES
Subjective   History of Present Illness  60-year-old female who fell getting out of her pool tonight and hit the right side of her back and complains of pain.  The patient states she has pain with a deep breath or with movement.  Patient also has a history of a double mastectomy 3 years ago for breast cancer and then was found to have a recurrence about 4 months ago and has been on chemotherapy.  She denies any hemoptysis.  She has a history of congenital heart disease and had surgery as a baby.  Patient also has cervical disc surgery.  The patient complains of some bruising to the left knee but states that she is able to bear weight and able to move her knee without difficulty.  Review of Systems   Constitutional: Positive for activity change.   HENT: Negative.    Eyes: Negative.    Respiratory: Positive for shortness of breath.    Gastrointestinal: Negative.    Endocrine: Negative.    Genitourinary: Negative.    Musculoskeletal: Positive for back pain and neck pain.   Skin: Negative.    Allergic/Immunologic: Negative.    Neurological: Negative.    Hematological: Bruises/bleeds easily.   Psychiatric/Behavioral: Positive for dysphoric mood.       Past Medical History:   Diagnosis Date   • Allergic    • Breast cancer (CMS/HCC) 2017    mets to lymph nodes; did not do radiation   • Cancer of unknown origin (CMS/HCC)    • Compression fx, thoracic spine, open, initial encounter (CMS/HCC)    • Coronary artery disease    • Diabetes mellitus (CMS/HCC)    • Heart disease, unspecified    • Hepatitis C    • Hypertension    • Obesity (BMI 30-39.9) 2/5/2021   • Sleep apnea     no machine   • Type 2 diabetes mellitus (CMS/HCC) 11/2017       Allergies   Allergen Reactions   • Promethazine Mental Status Change   • Tape Other (See Comments)     .blisters         Past Surgical History:   Procedure Laterality Date   • BACK SURGERY      neck   • BREAST RECONSTRUCTION Bilateral    • CARDIAC ABLATION       atrial tachycardia x 5  ablations    • CARDIAC CATHETERIZATION     • CARDIAC SURGERY      stent placed in aorta   • CARDIAC SURGERY      6 surgeries as baby    • CERVICAL FUSION ANTERIOR WITH ARTIFICIAL DISCECTOMY IMPLANTATION N/A 2020    Procedure: C4 VERTEBRECTOMY AND ANTERIOR CERIVCAL DISCECTOMY WITH FUSION OF CERVICAL THREE THROUGH FIVE WITH REMOVAL OF HARDWARE C5-C6;  Surgeon: Mark Kaba MD;  Location: [facility] MAIN OR;  Service: Neurosurgery;  Laterality: N/A;   •  SECTION      x2   • COLONOSCOPY N/A 10/23/2020    Procedure: COLONOSCOPY WITH POLYPECTOMY X6;  Surgeon: Stanley Bourne MD;  Location: [facility] ENDOSCOPY;  Service: Gastroenterology;  Laterality: N/A;  POLYPS, INTERNAL HEMORRHOIDS   • ENDOSCOPY N/A 10/23/2020    Procedure: ESOPHAGOGASTRODUODENOSCOPY WITH BIOPSY X 1 AREA;  Surgeon: Stanley Bourne MD;  Location: [facility] ENDOSCOPY;  Service: Gastroenterology;  Laterality: N/A;  GASTRITIS, ESOPHAGITIS, HIATAL HERNIA   • KNEE SURGERY     • MASTECTOMY Bilateral    • NECK SURGERY     • PACEMAKER IMPLANTATION         Family History   Problem Relation Age of Onset   • Heart disease Mother    • Stroke Mother    • Lung cancer Mother    • Aneurysm Father    • Diabetes Sister    • No Known Problems Brother    • No Known Problems Brother    • Diabetes type I Half-Sister    • Thyroid cancer Half-Sister    • Cancer Maternal Aunt    • Heart attack Sister    • Thyroid disease Sister    • No Known Problems Sister        Social History     Socioeconomic History   • Marital status:      Spouse name: Not on file   • Number of children: 2   • Years of education: Not on file   • Highest education level: Not on file   Tobacco Use   • Smoking status: Never Smoker   • Smokeless tobacco: Never Used   Vaping Use   • Vaping Use: Never used   Substance and Sexual Activity   • Alcohol use: Yes     Alcohol/week: 1.0 standard drinks     Types: 1 Glasses of wine per week     Comment: rare   • Drug use: Not  Currently   • Sexual activity: Defer           Objective   Physical Exam  Patient is awake and alert she is afebrile her vital signs are stable her O2 sat was 100% on room air the HEENT exam is unremarkable the chest has good breath sounds she has diffuse tenderness to the right posterior lateral chest area without crepitance or subcu emphysema cardiovascular exam reveals a regular rhythm the patient has had a double mastectomy and there is also an old thoracotomy scar on the left the abdomen is soft and nontender the patient has some bruising noted to the left knee but no effusion no crepitance the knee joint is stable and there is full range of motion  Procedures           ED Course            Results for orders placed or performed during the hospital encounter of 07/06/21   Comprehensive Metabolic Panel    Specimen: Blood   Result Value Ref Range    Glucose 178 (H) 65 - 99 mg/dL    BUN 14 6 - 20 mg/dL    Creatinine 0.79 0.57 - 1.00 mg/dL    Sodium 139 136 - 145 mmol/L    Potassium 4.7 3.5 - 5.2 mmol/L    Chloride 106 98 - 107 mmol/L    CO2 23.0 22.0 - 29.0 mmol/L    Calcium 9.0 8.6 - 10.5 mg/dL    Total Protein 7.2 6.0 - 8.5 g/dL    Albumin 4.40 3.50 - 5.20 g/dL    ALT (SGPT) 14 1 - 33 U/L    AST (SGOT) 18 1 - 32 U/L    Alkaline Phosphatase 59 39 - 117 U/L    Total Bilirubin 0.4 0.0 - 1.2 mg/dL    eGFR Non African Amer 75 >60 mL/min/1.73    Globulin 2.8 gm/dL    A/G Ratio 1.6 g/dL    BUN/Creatinine Ratio 17.7 7.0 - 25.0    Anion Gap 10.0 5.0 - 15.0 mmol/L   Troponin    Specimen: Blood   Result Value Ref Range    Troponin T <0.010 0.000 - 0.030 ng/mL   Troponin    Specimen: Blood   Result Value Ref Range    Troponin T <0.010 0.000 - 0.030 ng/mL   D-dimer, Quantitative    Specimen: Blood   Result Value Ref Range    D-Dimer, Quantitative 0.33 0.00 - 0.59 mg/L (FEU)   CBC Auto Differential    Specimen: Blood   Result Value Ref Range    WBC 2.00 (L) 3.40 - 10.80 10*3/mm3    RBC 3.86 3.77 - 5.28 10*6/mm3    Hemoglobin  11.2 (L) 12.0 - 15.9 g/dL    Hematocrit 34.0 34.0 - 46.6 %    MCV 88.2 79.0 - 97.0 fL    MCH 29.1 26.6 - 33.0 pg    MCHC 33.0 31.5 - 35.7 g/dL    RDW 18.6 (H) 12.3 - 15.4 %    RDW-SD 56.0 (H) 37.0 - 54.0 fl    MPV 7.9 6.0 - 12.0 fL    Platelets 97 (L) 140 - 450 10*3/mm3    Neutrophil % 55.3 42.7 - 76.0 %    Lymphocyte % 36.5 19.6 - 45.3 %    Monocyte % 7.1 5.0 - 12.0 %    Eosinophil % 0.4 0.3 - 6.2 %    Basophil % 0.7 0.0 - 1.5 %    Neutrophils, Absolute 1.10 (L) 1.70 - 7.00 10*3/mm3    Lymphocytes, Absolute 0.70 0.70 - 3.10 10*3/mm3    Monocytes, Absolute 0.10 0.10 - 0.90 10*3/mm3    Eosinophils, Absolute 0.00 0.00 - 0.40 10*3/mm3    Basophils, Absolute 0.00 0.00 - 0.20 10*3/mm3    nRBC 0.6 (H) 0.0 - 0.2 /100 WBC   ECG 12 Lead   Result Value Ref Range    QT Interval 502 ms   Lavender Top   Result Value Ref Range    Extra Tube hold for add-on      Medications   sodium chloride 0.9 % flush 10 mL (has no administration in time range)   HYDROmorphone (DILAUDID) injection 1 mg (1 mg Intravenous Given 7/23/21 2100)   ondansetron (ZOFRAN) injection 8 mg (8 mg Intravenous Given 7/23/21 2100)     CT Chest Without Contrast Diagnostic    Result Date: 7/23/2021  1. There are several acute appearing right rib fractures as reported above. 2. There are sclerotic bone lesions which are unchanged and may be due to metastases. 3. Stable lung opacities are likely related to scarring. No acute lung abnormality is noted. 4. There is stable cardiomegaly and enlargement of the pulmonary arteries.  Electronically Signed By-Raquel Zhao MD On:7/23/2021 9:51 PM This report was finalized on [id number] by  Raquel Zhao MD.                                      MDM  The patient was found to have fractures involving the right third fourth fifth and sixth ribs.  There is no evidence of pneumothorax or hemothorax.  She has sclerotic bone lesions which are unchanged.  The patient does have a history of metastatic breast cancer.  The patient  will be started on Percocet for pain.  Final diagnoses:   Closed fracture of multiple ribs of right side, initial encounter       ED Disposition  ED Disposition     ED Disposition Condition Comment    Discharge Stable           No follow-up provider specified.       Medication List      No changes were made to your prescriptions during this visit.          Rafa Ramirez MD  07/23/21 [id number]

## 2021-07-26 ENCOUNTER — OFFICE VISIT (OUTPATIENT)
Dept: ONCOLOGY | Facility: CLINIC | Age: 61
End: 2021-07-26

## 2021-07-26 ENCOUNTER — HOSPITAL ENCOUNTER (OUTPATIENT)
Dept: CT IMAGING | Facility: HOSPITAL | Age: 61
Discharge: HOME | End: 2021-07-26
Admitting: NURSE PRACTITIONER

## 2021-07-26 ENCOUNTER — LAB (OUTPATIENT)
Dept: LAB | Facility: HOSPITAL | Age: 61
End: 2021-07-26

## 2021-07-26 ENCOUNTER — APPOINTMENT (OUTPATIENT)
Dept: CT IMAGING | Facility: HOSPITAL | Age: 61
End: 2021-07-26

## 2021-07-26 VITALS
DIASTOLIC BLOOD PRESSURE: 79 MMHG | HEART RATE: 62 BPM | RESPIRATION RATE: 22 BRPM | OXYGEN SATURATION: 93 % | TEMPERATURE: 97.1 F | BODY MASS INDEX: 31.25 KG/M2 | SYSTOLIC BLOOD PRESSURE: 139 MMHG | HEIGHT: 60 IN

## 2021-07-26 DIAGNOSIS — C50.919 MALIGNANT NEOPLASM OF FEMALE BREAST, UNSPECIFIED ESTROGEN RECEPTOR STATUS, UNSPECIFIED LATERALITY, UNSPECIFIED SITE OF BREAST (HCC): ICD-10-CM

## 2021-07-26 DIAGNOSIS — C50.919 MALIGNANT NEOPLASM OF FEMALE BREAST, UNSPECIFIED ESTROGEN RECEPTOR STATUS, UNSPECIFIED LATERALITY, UNSPECIFIED SITE OF BREAST (HCC): Primary | ICD-10-CM

## 2021-07-26 LAB
BASOPHILS # BLD AUTO: 0.02 10*3/MM3 (ref 0–0.2)
BASOPHILS NFR BLD AUTO: 0.6 % (ref 0–1.5)
DEPRECATED RDW RBC AUTO: 53.5 FL (ref 37–54)
EOSINOPHIL # BLD AUTO: 0.03 10*3/MM3 (ref 0–0.4)
EOSINOPHIL NFR BLD AUTO: 0.9 % (ref 0.3–6.2)
ERYTHROCYTE [DISTWIDTH] IN BLOOD BY AUTOMATED COUNT: 16.3 % (ref 12.3–15.4)
HCT VFR BLD AUTO: 32.9 % (ref 34–46.6)
HGB BLD-MCNC: 10.5 G/DL (ref 12–15.9)
LYMPHOCYTES # BLD AUTO: 0.64 10*3/MM3 (ref 0.7–3.1)
LYMPHOCYTES NFR BLD AUTO: 18.8 % (ref 19.6–45.3)
MCH RBC QN AUTO: 30.2 PG (ref 26.6–33)
MCHC RBC AUTO-ENTMCNC: 31.9 G/DL (ref 31.5–35.7)
MCV RBC AUTO: 94.5 FL (ref 79–97)
MONOCYTES # BLD AUTO: 0.16 10*3/MM3 (ref 0.1–0.9)
MONOCYTES NFR BLD AUTO: 4.7 % (ref 5–12)
NEUTROPHILS NFR BLD AUTO: 2.56 10*3/MM3 (ref 1.7–7)
NEUTROPHILS NFR BLD AUTO: 75 % (ref 42.7–76)
PLATELET # BLD AUTO: 119 10*3/MM3 (ref 140–450)
PMV BLD AUTO: 9.9 FL (ref 6–12)
RBC # BLD AUTO: 3.48 10*6/MM3 (ref 3.77–5.28)
WBC # BLD AUTO: 3.41 10*3/MM3 (ref 3.4–10.8)

## 2021-07-26 PROCEDURE — 80053 COMPREHEN METABOLIC PANEL: CPT | Performed by: INTERNAL MEDICINE

## 2021-07-26 PROCEDURE — 99215 OFFICE O/P EST HI 40 MIN: CPT | Performed by: INTERNAL MEDICINE

## 2021-07-26 PROCEDURE — 85025 COMPLETE CBC W/AUTO DIFF WBC: CPT

## 2021-07-26 PROCEDURE — 0 IOPAMIDOL PER 1 ML: Performed by: NURSE PRACTITIONER

## 2021-07-26 PROCEDURE — 86300 IMMUNOASSAY TUMOR CA 15-3: CPT | Performed by: INTERNAL MEDICINE

## 2021-07-26 PROCEDURE — 36415 COLL VENOUS BLD VENIPUNCTURE: CPT | Performed by: INTERNAL MEDICINE

## 2021-07-26 PROCEDURE — 74177 CT ABD & PELVIS W/CONTRAST: CPT

## 2021-07-26 PROCEDURE — 70460 CT HEAD/BRAIN W/DYE: CPT

## 2021-07-26 RX ADMIN — IOPAMIDOL 100 ML: 755 INJECTION, SOLUTION INTRAVENOUS at 13:44

## 2021-07-27 ENCOUNTER — TELEPHONE (OUTPATIENT)
Dept: ONCOLOGY | Facility: CLINIC | Age: 61
End: 2021-07-27

## 2021-07-27 ENCOUNTER — PATIENT OUTREACH (OUTPATIENT)
Dept: CASE MANAGEMENT | Facility: OTHER | Age: 61
End: 2021-07-27

## 2021-07-27 LAB
ALBUMIN SERPL-MCNC: 3.7 G/DL (ref 3.5–5.2)
ALBUMIN/GLOB SERPL: 1.3 G/DL
ALP SERPL-CCNC: 48 U/L (ref 39–117)
ALT SERPL W P-5'-P-CCNC: 409 U/L (ref 1–33)
ANION GAP SERPL CALCULATED.3IONS-SCNC: 11.3 MMOL/L (ref 5–15)
AST SERPL-CCNC: 210 U/L (ref 1–32)
BILIRUB SERPL-MCNC: 0.6 MG/DL (ref 0–1.2)
BUN SERPL-MCNC: 16 MG/DL (ref 6–20)
BUN/CREAT SERPL: 19.5 (ref 7–25)
CALCIUM SPEC-SCNC: 8.6 MG/DL (ref 8.6–10.5)
CANCER AG15-3 SERPL-ACNC: 62 U/ML
CANCER AG27-29 SERPL-ACNC: NORMAL
CEA SERPL-MCNC: 3 NG/ML (ref 0–4.7)
CHLORIDE SERPL-SCNC: 101 MMOL/L (ref 98–107)
CO2 SERPL-SCNC: 20.7 MMOL/L (ref 22–29)
CREAT SERPL-MCNC: 0.82 MG/DL (ref 0.57–1)
GFR SERPL CREATININE-BSD FRML MDRD: 72 ML/MIN/1.73
GLOBULIN UR ELPH-MCNC: 2.9 GM/DL
GLUCOSE SERPL-MCNC: 171 MG/DL (ref 65–99)
POTASSIUM SERPL-SCNC: 4.3 MMOL/L (ref 3.5–5.2)
PROT SERPL-MCNC: 6.6 G/DL (ref 6–8.5)
SODIUM SERPL-SCNC: 133 MMOL/L (ref 136–145)

## 2021-07-27 RX ORDER — CYCLOBENZAPRINE HCL 10 MG
10 TABLET ORAL 2 TIMES DAILY PRN
Qty: 30 TABLET | Refills: 0 | Status: SHIPPED | OUTPATIENT
Start: 2021-07-27 | End: 2022-01-10

## 2021-07-27 NOTE — TELEPHONE ENCOUNTER
Spoke to Dr. Nunez about this patient. I let her know patient denies worsened SOB and states it is just muscle spasms. She stated we could give her 10mg Flexeril BID for muscle spasms. Request sent. I called patient back and she did not answer. I asked her to call me back so I could let her know of Dr. Dumont orders. Dr. Nunez ordered the flexeril but wants her to take it separately of the percocet that the ER ordered due to risk of sedation.

## 2021-07-27 NOTE — TELEPHONE ENCOUNTER
Caller: Lucien Garrison    Relationship: Self    Best call back number:[phone]    Who are you requesting to speak with (clinical staff, provider,  specific staff member): CLINICAL STAFF    What was the call regarding: LUCIEN CALLED TO SAY THAT SHE WAS SEEN IN THE HOSPITAL ON 07/23 FOR FRACTURED RIBS. SHE CALLED TODAY WITH A LOT OF BACK PAIN, AND WANTED TO KNOW IF SOMEONE COULD PRESCRIBE HER A MUSCLE RELAXER. SHE WAS ADVISED TO GO BACK TO THE HOSPITAL IF THE PAIN BECAME WORSE, BUT DID NOT WANT TO GO.     Do you require a callback: YES

## 2021-07-27 NOTE — TELEPHONE ENCOUNTER
Called patient and let her know I would ask DR. Nunez about a muscle relaxer, but she may want her to go to ER. Patient said she is not driving 30 mins to the ER and hung up.

## 2021-07-27 NOTE — OUTREACH NOTE
Ambulatory Case Management Note    Care Coordination    Multiple RN-ACM outreach attempts to patient unsuccessful s/p ED discharge. Patient eligible for RN- Ambulatory . RN-ACM available for assistance as needed. RN-ACM can be reached at [phone].    Robel Noe RN  Ambulatory Case Management    7/27/2021, 11:02 EDT

## 2021-07-28 ENCOUNTER — TELEPHONE (OUTPATIENT)
Dept: ONCOLOGY | Facility: CLINIC | Age: 61
End: 2021-07-28

## 2021-07-28 NOTE — TELEPHONE ENCOUNTER
Called pt to let her know that there was no new mets on scan. I let her know she could use lidocaine patches to help with pain in that area. She had no new questions.

## 2021-08-02 ENCOUNTER — HOSPITAL ENCOUNTER (OUTPATIENT)
Dept: PET IMAGING | Facility: HOSPITAL | Age: 61
End: 2021-08-02

## 2021-08-10 ENCOUNTER — TELEPHONE (OUTPATIENT)
Dept: ONCOLOGY | Facility: HOSPITAL | Age: 61
End: 2021-08-10

## 2021-08-10 ENCOUNTER — LAB (OUTPATIENT)
Dept: LAB | Facility: HOSPITAL | Age: 61
End: 2021-08-10

## 2021-08-10 ENCOUNTER — OFFICE VISIT (OUTPATIENT)
Dept: ONCOLOGY | Facility: CLINIC | Age: 61
End: 2021-08-10

## 2021-08-10 VITALS
WEIGHT: 159 LBS | RESPIRATION RATE: 20 BRPM | BODY MASS INDEX: 31.22 KG/M2 | DIASTOLIC BLOOD PRESSURE: 84 MMHG | TEMPERATURE: 97.8 F | HEART RATE: 87 BPM | HEIGHT: 60 IN | SYSTOLIC BLOOD PRESSURE: 156 MMHG

## 2021-08-10 DIAGNOSIS — C50.919 MALIGNANT NEOPLASM OF FEMALE BREAST, UNSPECIFIED ESTROGEN RECEPTOR STATUS, UNSPECIFIED LATERALITY, UNSPECIFIED SITE OF BREAST (HCC): Primary | ICD-10-CM

## 2021-08-10 DIAGNOSIS — C79.51 SPINE METASTASIS: ICD-10-CM

## 2021-08-10 DIAGNOSIS — C50.919 MALIGNANT NEOPLASM OF FEMALE BREAST, UNSPECIFIED ESTROGEN RECEPTOR STATUS, UNSPECIFIED LATERALITY, UNSPECIFIED SITE OF BREAST (HCC): ICD-10-CM

## 2021-08-10 LAB
BASOPHILS # BLD AUTO: 0.02 10*3/MM3 (ref 0–0.2)
BASOPHILS NFR BLD AUTO: 0.9 % (ref 0–1.5)
DEPRECATED RDW RBC AUTO: 52.9 FL (ref 37–54)
EOSINOPHIL # BLD AUTO: 0.01 10*3/MM3 (ref 0–0.4)
EOSINOPHIL NFR BLD AUTO: 0.4 % (ref 0.3–6.2)
ERYTHROCYTE [DISTWIDTH] IN BLOOD BY AUTOMATED COUNT: 15.8 % (ref 12.3–15.4)
HCT VFR BLD AUTO: 35.3 % (ref 34–46.6)
HGB BLD-MCNC: 11.2 G/DL (ref 12–15.9)
LYMPHOCYTES # BLD AUTO: 1.05 10*3/MM3 (ref 0.7–3.1)
LYMPHOCYTES NFR BLD AUTO: 45.9 % (ref 19.6–45.3)
MCH RBC QN AUTO: 30.6 PG (ref 26.6–33)
MCHC RBC AUTO-ENTMCNC: 31.7 G/DL (ref 31.5–35.7)
MCV RBC AUTO: 96.4 FL (ref 79–97)
MONOCYTES # BLD AUTO: 0.13 10*3/MM3 (ref 0.1–0.9)
MONOCYTES NFR BLD AUTO: 5.7 % (ref 5–12)
NEUTROPHILS NFR BLD AUTO: 1.08 10*3/MM3 (ref 1.7–7)
NEUTROPHILS NFR BLD AUTO: 47.1 % (ref 42.7–76)
PLATELET # BLD AUTO: 64 10*3/MM3 (ref 140–450)
PMV BLD AUTO: 10.5 FL (ref 6–12)
RBC # BLD AUTO: 3.66 10*6/MM3 (ref 3.77–5.28)
WBC # BLD AUTO: 2.29 10*3/MM3 (ref 3.4–10.8)

## 2021-08-10 PROCEDURE — 85025 COMPLETE CBC W/AUTO DIFF WBC: CPT

## 2021-08-10 PROCEDURE — 36415 COLL VENOUS BLD VENIPUNCTURE: CPT

## 2021-08-10 PROCEDURE — 99214 OFFICE O/P EST MOD 30 MIN: CPT | Performed by: INTERNAL MEDICINE

## 2021-08-10 NOTE — TELEPHONE ENCOUNTER
Case Management/ Note    Patient Name: Gladys Garrison  YOB: 1960  MRN #: [id number]    OSW called patient to ask about workplace restrictions for her FMLA paperwork. The paperwork was complete and sent to Dr. Nunez for signature. Patient would like to go back to work tomorrow, if possible. OSW will remain available.     Electronically signed by:   Kita Falcon LCSW, OSW-C  08/10/21, 15:19 EDT

## 2021-08-10 NOTE — PROGRESS NOTES
Hematology/Oncology Outpatient Follow Up    PATIENT NAME:Gladys Garrison  :1960  MRN: [id number]  PRIMARY CARE PHYSICIAN: Kourtney Coffman APRN  REFERRING PHYSICIAN: Kourtney Coffman APRN    Chief Complaint   Patient presents with   • Follow-up     Malignant neoplasm of female breast        HISTORY OF PRESENT ILLNESS:     This is a 60-year-old female who multiple comorbidities including congenital abnormalities such as hypoplastic kidney, tetralogy of Fallot, coarctation of the aorta and congenital VSD status post repair.  Patient developed syncopal episode and for that reason she had she had a CT scan of the chest which showed mass in the left breast.  She had diagnostic mammogram and ultrasound which showed a 2 cm spiculated mass in the left breast at the 1 o'clock position 6 cm from the nipple.  Biopsy of the left breast mass revealed invasive moderately differentiated carcinoma ER/AR positive and HER-2/bishop negative.  Patient also had an ultrasound of the axilla which was concerning for an abnormal left axillary lymph node with cortical thickening.  She apparently had an FNA of the left axillary nodule which was positive for malignancy.  On 2017 patient underwent left modified radical mastectomy, right prophylactic mastectomy and immediate breast reconstruction.  She also underwent right prophylactic mastectomy.  We have had her on records suggest that patient did have multifocal disease with pT2 pN1 aM0.  The largest focus measured 3.5 cm.  2 of 11 lymph nodes were positive for metastatic disease some with extracapsular extension.  Notes that patient did receive Arimidex neoadjuvant  from 2017 to 2018 prior to her bilateral mastectomies.    Review of her note suggest that she developed cough which she attributed to anastrozole and patient was then placed on tamoxifen.  She had MammaPrint testing which returned with low risk for relapse.    Her postop course was also complicated by  left chest wall abscess which resulted in I&D and removal of the left tissue expander. She was referred for radiation treatment boards ultimately declined.    Patient was then placed on tamoxifen in April 2018 which she stopped after less than a month due to nausea and vomiting.  Patient in the interim also was diagnosed with chronic hepatitis C was seen by the hepatologist.  Tamoxifen was dose reduced to 10 mg daily with the goal to increase to 20 mg daily.      Patient has relocated to [facility].  She has transitioned her care to us now.  She is currently not on any antiestrogen therapy.     Her bilateral mastectomy specimen are available for review    · 1/29/2021 patient had bone density which showed osteoporosis  · Patient was seen by neurosurgery and had a bone scan done in March 2021 which showed subtle activity in the inferior L4 vertebral body appears to correlate with new area of sclerosis on CT of the abdomen and pelvis.  There is also subtle activity with possible new lesion at the posterior left sacrum.  These findings were concerning for metastatic disease.  PET CT scan was recommended to further evaluate.  · 4/8/2021 patient had a PET CT scan which showed evidence of disease in the neck chest abdomen and pelvis.  But she has a 1.1 cm mixed lytic/sclerotic hypermetabolic lesion within the left hemisacrum consistent with metastatic disease.  There is also accumulation within the inferior endplate of the L4 vertebral body thought to correspond to 1.2 centimeters sclerotic lesion consistent with metastatic disease.  There is a tiny hypermetabolic focus within the L3, T11.  Suspicious for also early metastatic disease.  · 4/26/2021 patient underwent CT-guided biopsy of sacral lesion, pathology was consistent with metastatic breast cancer ER and DE positive HER-2/bishop was negative.  · 7/6/21 CT new sclerosis within the right 12th rib posteriorly which could represent metastatic lesion or a healed or  healing fracture, new sclerosis within the right 12th rib posteriorly which         could represent metastatic lesion,new sclerosis within the right T8 transverse process worrisome for metastatic disease progression.  · 21 complaints of 8/10 back pain and 8/10 LUQ pain. Referral to radiation for questionable mets on CT chest.  · 2021 patient had CT scan of the head with contrast with did not show any evidence of metastatic disease.  CT scan of the chest abdomen and pelvis did not show any evidence of progressive disease.  · 2021 patient had CEA level which shows declining values CA 15-3 has decreased to 62 from 84    History of present illness was reviewed and is unchanged from the previous visit. 21      Past Medical History:   Diagnosis Date   • Allergic    • Breast cancer (CMS/HCC) 2017    mets to lymph nodes; did not do radiation   • Cancer of unknown origin (CMS/HCC)    • Compression fx, thoracic spine, open, initial encounter (CMS/HCC)    • Coronary artery disease    • Diabetes mellitus (CMS/HCC)    • Heart disease, unspecified    • Hepatitis C    • Hypertension    • Obesity (BMI 30-39.9) 2021   • Sleep apnea     no machine   • Type 2 diabetes mellitus (CMS/HCC) 2017       Past Surgical History:   Procedure Laterality Date   • BACK SURGERY      neck   • BREAST RECONSTRUCTION Bilateral    • CARDIAC ABLATION       atrial tachycardia x 5 ablations    • CARDIAC CATHETERIZATION     • CARDIAC SURGERY      stent placed in aorta   • CARDIAC SURGERY      6 surgeries as baby    • CERVICAL FUSION ANTERIOR WITH ARTIFICIAL DISCECTOMY IMPLANTATION N/A 2020    Procedure: C4 VERTEBRECTOMY AND ANTERIOR CERIVCAL DISCECTOMY WITH FUSION OF CERVICAL THREE THROUGH FIVE WITH REMOVAL OF HARDWARE C5-C6;  Surgeon: Mark Kaba MD;  Location: [facility] MAIN OR;  Service: Neurosurgery;  Laterality: N/A;   •  SECTION      x2   • COLONOSCOPY N/A 10/23/2020    Procedure: COLONOSCOPY WITH  POLYPECTOMY X6;  Surgeon: Stanley Bourne MD;  Location: [facility] ENDOSCOPY;  Service: Gastroenterology;  Laterality: N/A;  POLYPS, INTERNAL HEMORRHOIDS   • ENDOSCOPY N/A 10/23/2020    Procedure: ESOPHAGOGASTRODUODENOSCOPY WITH BIOPSY X 1 AREA;  Surgeon: Stanley Bourne MD;  Location: [facility] ENDOSCOPY;  Service: Gastroenterology;  Laterality: N/A;  GASTRITIS, ESOPHAGITIS, HIATAL HERNIA   • KNEE SURGERY  2000   • MASTECTOMY Bilateral    • NECK SURGERY     • PACEMAKER IMPLANTATION           Current Outpatient Medications:   •  Accu-Chek FastClix Lancets misc, For finger stick 4x/d, Disp: 100 each, Rfl: 12  •  Alcohol Swabs 70 % pads, Use tid, Disp: 300 each, Rfl: 2  •  anastrozole (ARIMIDEX) 1 MG tablet, Take 1 tablet by mouth Daily., Disp: 30 tablet, Rfl: 6  •  aspirin 81 MG chewable tablet, Chew 1 tablet Daily. (Patient taking differently: Chew 81 mg Daily. Hold DOS), Disp: 30 tablet, Rfl: 1  •  Blood Glucose Monitoring Suppl (ACCU-CHEK ARACELI PLUS) w/Device kit, Use as directed   DX  E11.9, Disp: 1 kit, Rfl: 0  •  Calcium Carbonate-Vit D-Min (Calcium 600+D Plus Minerals) 600-400 MG-UNIT chewable tablet, Chew 600 mg 2 (Two) Times a Day., Disp: 60 each, Rfl: 6  •  celecoxib (CeleBREX) 200 MG capsule, Take 1 capsule by mouth Daily. Take one tablet in the am, Disp: 20 capsule, Rfl: 1  •  Continuous Blood Gluc Sensor (FreeStyle Rajeev 14 Day Sensor) [facility], 1 each Every 14 (Fourteen) Days., Disp: 6 each, Rfl: 1  •  cyclobenzaprine (FLEXERIL) 10 MG tablet, Take 1 tablet by mouth 3 (Three) Times a Day As Needed for Muscle Spasms., Disp: 90 tablet, Rfl: 0  •  cyclobenzaprine (FLEXERIL) 10 MG tablet, Take 1 tablet by mouth 2 (Two) Times a Day As Needed for Muscle Spasms., Disp: 30 tablet, Rfl: 0  •  glucose blood (Accu-Chek Araceli Plus) test strip, To check blood sugar 4x/d, Disp: 150 each, Rfl: 12  •  insulin NPH-insulin regular (humuLIN 70/30,novoLIN 70/30) (70-30) 100 UNIT/ML injection, Inject 40 Units  "under the skin into the appropriate area as directed Every Morning., Disp: , Rfl:   •  insulin NPH-insulin regular (humuLIN 70/30,novoLIN 70/30) (70-30) 100 UNIT/ML injection, Inject 30 Units under the skin into the appropriate area as directed Every Evening., Disp: , Rfl:   •  insulin NPH-insulin regular (NovoLIN 70/30) (70-30) 100 UNIT/ML injection, Inject  under the skin into the appropriate area as directed., Disp: , Rfl:   •  Insulin Syringe 31G X 5/16\" 1 ML misc, 1 syringe 2 (Two) Times a Day., Disp: 100 each, Rfl: 1  •  lidocaine (LIDODERM) 5 %, Place 1 patch on the skin as directed by provider Daily. Remove & Discard patch within 12 hours or as directed by MD, Disp: 10 patch, Rfl: 2  •  lisinopril (PRINIVIL,ZESTRIL) 20 MG tablet, Take 20 mg by mouth Daily., Disp: , Rfl:   •  ondansetron (ZOFRAN) 8 MG tablet, Take 1 tablet by mouth 3 (Three) Times a Day As Needed for Nausea or Vomiting., Disp: 30 tablet, Rfl: 5  •  oxyCODONE-acetaminophen (PERCOCET)  MG per tablet, Take 1 tablet by mouth Every 6 (Six) Hours As Needed for Moderate Pain  or Severe Pain  for up to 20 doses., Disp: 20 tablet, Rfl: 0  •  oxyCODONE-acetaminophen (PERCOCET) 5-325 MG per tablet, Take 1 tablet by mouth Take As Directed. Take 1 tablet by mouth every 4-6 hours prn pain, Disp: 40 tablet, Rfl: 0  •  Palbociclib 125 MG tablet, Take 125 mg by mouth Daily., Disp: , Rfl:   •  prochlorperazine (COMPAZINE) 10 MG tablet, Take 1 tablet by mouth Every 6 (Six) Hours As Needed for Nausea or Vomiting., Disp: 90 tablet, Rfl: 1  •  rosuvastatin (Crestor) 20 MG tablet, Take 1 tablet by mouth Every Night., Disp: 90 tablet, Rfl: 0    Allergies   Allergen Reactions   • Promethazine Mental Status Change   • Tape Other (See Comments)     .blisters         Family History   Problem Relation Age of Onset   • Heart disease Mother    • Stroke Mother    • Lung cancer Mother    • Aneurysm Father    • Diabetes Sister    • No Known Problems Brother    • No " "Known Problems Brother    • Diabetes type I Half-Sister    • Thyroid cancer Half-Sister    • Cancer Maternal Aunt    • Heart attack Sister    • Thyroid disease Sister    • No Known Problems Sister        Cancer-related family history includes Cancer in her maternal aunt; Lung cancer in her mother; Thyroid cancer in her half-sister.    Social History     Tobacco Use   • Smoking status: Never Smoker   • Smokeless tobacco: Never Used   Vaping Use   • Vaping Use: Never used   Substance Use Topics   • Alcohol use: Yes     Alcohol/week: 1.0 standard drinks     Types: 1 Glasses of wine per week     Comment: rare   • Drug use: Not Currently       HPI, ROS and PFSH have been reviewed and confirmed on 8/10/2021.     SUBJECTIVE:    Overall she has less pain issues.  She is now comfortable            REVIEW OF SYSTEMS:    Review of Systems     OBJECTIVE:    Vitals:    08/10/21 1353   BP: 156/84   Pulse: 87   Resp: 20   Temp: 97.8 °F (36.6 °C)   TempSrc: Infrared   Weight: 72.1 kg (159 lb)   Height: 152.4 cm (60\")   PainSc:   7   PainLoc: Comment: ribs     Body mass index is 31.05 kg/m².    ECOG    (1) Restricted in physically strenuous activity, ambulatory and able to do work of light nature    Physical Exam  Vitals and nursing note reviewed.   Constitutional:       General: She is not in acute distress.     Appearance: Normal appearance. She is not diaphoretic.   HENT:      Head: Normocephalic and atraumatic.      Mouth/Throat:      Mouth: Mucous membranes are moist.      Pharynx: Oropharynx is clear.   Eyes:      General: No scleral icterus.        Right eye: No discharge.         Left eye: No discharge.      Extraocular Movements: Extraocular movements intact.      Conjunctiva/sclera: Conjunctivae normal.   Neck:      Thyroid: No thyromegaly.   Cardiovascular:      Rate and Rhythm: Normal rate and regular rhythm.      Pulses: Normal pulses.      Heart sounds: Normal heart sounds. No friction rub. No gallop.    Pulmonary:     "  Effort: Pulmonary effort is normal. No respiratory distress.      Breath sounds: Normal breath sounds. No stridor. No wheezing.   Abdominal:      General: Bowel sounds are normal. There is distension.      Palpations: Abdomen is soft. There is no mass.      Tenderness: There is abdominal tenderness. There is guarding (Left upper abdomen). There is no rebound.   Musculoskeletal:         General: No tenderness. Normal range of motion.      Cervical back: Normal range of motion and neck supple.      Right lower leg: No edema.      Left lower leg: No edema.      Comments: Neck pain.  Patient has a neck collar.   Lymphadenopathy:      Cervical: No cervical adenopathy.   Skin:     General: Skin is warm and dry.      Capillary Refill: Capillary refill takes less than 2 seconds.      Findings: No bruising, erythema or rash.   Neurological:      Mental Status: She is alert and oriented to person, place, and time.      Cranial Nerves: No cranial nerve deficit.      Sensory: No sensory deficit.      Motor: No abnormal muscle tone.   Psychiatric:         Mood and Affect: Mood normal.         Behavior: Behavior normal.         Thought Content: Thought content normal.         Judgment: Judgment normal.     I have reexamined the patient and the results are consistent with the previously documented exam. Мария Lian Nunez MD     RECENT LABS    WBC   Date Value Ref Range Status   08/10/2021 2.29 (L) 3.40 - 10.80 10*3/mm3 Final     RBC   Date Value Ref Range Status   08/10/2021 3.66 (L) 3.77 - 5.28 10*6/mm3 Final     Hemoglobin   Date Value Ref Range Status   08/10/2021 11.2 (L) 12.0 - 15.9 g/dL Final     Hematocrit   Date Value Ref Range Status   08/10/2021 35.3 34.0 - 46.6 % Final     MCV   Date Value Ref Range Status   08/10/2021 96.4 79.0 - 97.0 fL Final     MCH   Date Value Ref Range Status   08/10/2021 30.6 26.6 - 33.0 pg Final     MCHC   Date Value Ref Range Status   08/10/2021 31.7 31.5 - 35.7 g/dL Final     RDW   Date  Value Ref Range Status   08/10/2021 15.8 (H) 12.3 - 15.4 % Final     RDW-SD   Date Value Ref Range Status   08/10/2021 52.9 37.0 - 54.0 fl Final     MPV   Date Value Ref Range Status   08/10/2021 10.5 6.0 - 12.0 fL Final     Platelets   Date Value Ref Range Status   08/10/2021 64 (L) 140 - 450 10*3/mm3 Final     Neutrophil %   Date Value Ref Range Status   08/10/2021 47.1 42.7 - 76.0 % Final     Lymphocyte %   Date Value Ref Range Status   08/10/2021 45.9 (H) 19.6 - 45.3 % Final     Monocyte %   Date Value Ref Range Status   08/10/2021 5.7 5.0 - 12.0 % Final     Eosinophil %   Date Value Ref Range Status   08/10/2021 0.4 0.3 - 6.2 % Final     Basophil %   Date Value Ref Range Status   08/10/2021 0.9 0.0 - 1.5 % Final     Immature Grans %   Date Value Ref Range Status   07/20/2020 0.7 (H) 0.0 - 0.5 % Final     Neutrophils, Absolute   Date Value Ref Range Status   08/10/2021 1.08 (L) 1.70 - 7.00 10*3/mm3 Final     Lymphocytes, Absolute   Date Value Ref Range Status   08/10/2021 1.05 0.70 - 3.10 10*3/mm3 Final     Monocytes, Absolute   Date Value Ref Range Status   08/10/2021 0.13 0.10 - 0.90 10*3/mm3 Final     Eosinophils, Absolute   Date Value Ref Range Status   08/10/2021 0.01 0.00 - 0.40 10*3/mm3 Final     Basophils, Absolute   Date Value Ref Range Status   08/10/2021 0.02 0.00 - 0.20 10*3/mm3 Final     Immature Grans, Absolute   Date Value Ref Range Status   07/20/2020 0.05 0.00 - 0.05 10*3/mm3 Final     nRBC   Date Value Ref Range Status   07/06/2021 0.6 (H) 0.0 - 0.2 /100 WBC Final       Lab Results   Component Value Date    GLUCOSE 171 (H) 07/26/2021    BUN 16 07/26/2021    CREATININE 0.82 07/26/2021    EGFRIFNONA 72 07/26/2021    EGFRIFAFRI >60 10/12/2018    BCR 19.5 07/26/2021    K 4.3 07/26/2021    CO2 20.7 (L) 07/26/2021    CALCIUM 8.6 07/26/2021    PROTENTOTREF 7.4 12/14/2020    ALBUMIN 3.70 07/26/2021    LABIL2 0.9 12/14/2020     (H) 07/26/2021     (H) 07/26/2021            ASSESSMENT:    · Metastatic breast cancer presenting as 1.1 cm mixed lytic/sclerotic hypermetabolic lesion in the left hemisacrum suspicious for metastatic disease 1.2 cm sclerotic lesion on L4, small L3 lesion and T11 lesion.  Tumor is ER positive, OR positive and HER-2/bishop negative.  Patient is currently on aromatase inhibitor, I have therefore recommended addition of CDK 4 inhibitor with Ibrance.  She will also benefit from biphosphonate therapy to prevent skeletal fractures.  Will discontinue Prolia and begin Xgeva as well.  Reviewed the side effects of Ibrance in detail with patient to include but not limited to fatigue, hepatic function abnormalities, interstitial lung disease, pancytopenia.  She is currently on combination of Ibrance, Arimidex and (Xgeva to be started once her dental procedure has been completed).  Her scans and tumor markers suggest that she is responding to treatment  · Tooth ache probable secondary to infection.  Patient has appointment with dentist coming up.  She is currently on antibiotics.  She will let me know once her dental procedures are done  · Pancytopenia secondary to Ibrance: Continue to monitor her counts very closely  · ypT2 N1 aM0 status post left modified radical mastectomy with lymph node dissection and right prophylactic mastectomy in 2017 performed at [facility].  ER positive, OR positive and HER-2/bishop negative.  Status post bilateral chest wall reconstruction.  According to patient, she tolerated Arimidex very well except that she also had osteoporosis therefore Arimidex was discontinued at that time  · Intolerance of tamoxifen in the past  · Osteoporosis: She was on Prolia: We will transition to Xgeva since she has bone metastases  · Status post neoadjuvant Arimidex prior to bilateral mastectomies  · Thrombocytopenia: Work-up was - December 2020  · Neck pain status post cervical spine surgery: Patient has a soft neck collar  · Complex  cardiac medical history including tetralogy of Fallot, coarctation of aorta, VSD status post repair.  Status post stent placement for coarctation of aorta  · Personal history and strong family history of breast cancer in multiple in the relatives on paternal side of the family including 4 paternal aunts in their 30s and 40s and 2 maternal aunts at age of 20s and 50s.  There is concern for possible hereditary breast cancer syndrome.  Patient may be  interested in pursuing cancer genetics for her management.  · Assessment has been reviewed and updated  n        Discussion    Patient has metastatic breast cancer estrogen and progesterone dependent and HER-2/bishop negative.  She is currently on Arimidex.  We will add Ibrance.  We will also discontinue Prolia and begin Xgeva to help reduce skeletal events.           Plans:     · Reviewed her imaging studies which does not show any evidence of progressive disease  · Follow-up with dentist as soon as possible  · Hold Xgeva until all dental procedures have been completed and she has had a follow-up visit   · Continue Arimidex, Ibrance: Reviewed with patient  · Reviewed path report  · Reviewed CMP, CBC, CEA, CA 15-3, CA 27.29.  Today with patient  · Discontinued Ultram 100 mg p.o. every 12 as needed for pain due to heartburn and begin Percocet 5 mg p.o. every 4-6 hours as needed for pain number 40 tablets  · Reviewed work-up for thrombocytopenia which was negative  · Reviewed her bone density which showed osteoporosis  · Hold Xgeva 120 mg subcu monthly Xgeva has not been started yet: dental work not completed yet  · Continue Arimidex 1 mg p.o. daily  · Continue Os-Chetan D 600 mg tablets twice a day  · She cannot tolerate tamoxifen.  Explained to patient that we can still treat her with Arimidex    · All questions answered  · Follow-up in 4 weeks or earlier as needed for problems  · All questions answered                     Patient verbalized understanding and is in agreement of  the above plan.       I spent 30 total minutes, face-to-face, caring for Gladys today.  90% of this time involved counseling and/or coordination of care as documented within this note.

## 2021-08-12 ENCOUNTER — MEDICATION THERAPY MANAGEMENT (OUTPATIENT)
Dept: PHARMACY | Facility: HOSPITAL | Age: 61
End: 2021-08-12

## 2021-08-12 DIAGNOSIS — C50.919 MALIGNANT NEOPLASM OF FEMALE BREAST, UNSPECIFIED ESTROGEN RECEPTOR STATUS, UNSPECIFIED LATERALITY, UNSPECIFIED SITE OF BREAST (HCC): Primary | ICD-10-CM

## 2021-08-12 NOTE — PROGRESS NOTES
MTM Note: Elevated LFTs    Discussed lab results with Dr. Nunez, repeat CMP today or tomorrow and hold next cycle of Ibrance until labs are reviewed.  Patient reports that she is on her week off of Ibrance and should start on 8/14/21.  Let her know to not start until she hears from our office with approval due to labs.  Patient also reports that she feels ok except still pain from her ribs and she also hurt her achilles tendon and they area is heavily bruised.  She has not had any blood in her urine, stool, mouth or any unusual bleeding.  Patient scheduled for labs for tomorrow.

## 2021-08-13 ENCOUNTER — MEDICATION THERAPY MANAGEMENT (OUTPATIENT)
Dept: PHARMACY | Facility: HOSPITAL | Age: 61
End: 2021-08-13

## 2021-08-13 ENCOUNTER — LAB (OUTPATIENT)
Dept: LAB | Facility: HOSPITAL | Age: 61
End: 2021-08-13

## 2021-08-13 DIAGNOSIS — C50.919 MALIGNANT NEOPLASM OF FEMALE BREAST, UNSPECIFIED ESTROGEN RECEPTOR STATUS, UNSPECIFIED LATERALITY, UNSPECIFIED SITE OF BREAST (HCC): ICD-10-CM

## 2021-08-13 DIAGNOSIS — C50.919 MALIGNANT NEOPLASM OF FEMALE BREAST, UNSPECIFIED ESTROGEN RECEPTOR STATUS, UNSPECIFIED LATERALITY, UNSPECIFIED SITE OF BREAST (HCC): Primary | ICD-10-CM

## 2021-08-13 LAB
ALBUMIN SERPL-MCNC: 4.5 G/DL (ref 3.5–5.2)
ALBUMIN/GLOB SERPL: 1.3 G/DL
ALP SERPL-CCNC: 95 U/L (ref 39–117)
ALT SERPL W P-5'-P-CCNC: 31 U/L (ref 1–33)
ANION GAP SERPL CALCULATED.3IONS-SCNC: 12 MMOL/L (ref 5–15)
AST SERPL-CCNC: 28 U/L (ref 1–32)
BASOPHILS # BLD AUTO: 0.03 10*3/MM3 (ref 0–0.2)
BASOPHILS NFR BLD AUTO: 1 % (ref 0–1.5)
BILIRUB SERPL-MCNC: 0.4 MG/DL (ref 0–1.2)
BUN SERPL-MCNC: 18 MG/DL (ref 6–20)
BUN/CREAT SERPL: 23.7 (ref 7–25)
CALCIUM SPEC-SCNC: 9.7 MG/DL (ref 8.6–10.5)
CHLORIDE SERPL-SCNC: 106 MMOL/L (ref 98–107)
CO2 SERPL-SCNC: 22 MMOL/L (ref 22–29)
CREAT SERPL-MCNC: 0.76 MG/DL (ref 0.57–1)
DEPRECATED RDW RBC AUTO: 52.2 FL (ref 37–54)
EOSINOPHIL # BLD AUTO: 0.02 10*3/MM3 (ref 0–0.4)
EOSINOPHIL NFR BLD AUTO: 0.7 % (ref 0.3–6.2)
ERYTHROCYTE [DISTWIDTH] IN BLOOD BY AUTOMATED COUNT: 15.8 % (ref 12.3–15.4)
GFR SERPL CREATININE-BSD FRML MDRD: 78 ML/MIN/1.73
GLOBULIN UR ELPH-MCNC: 3.6 GM/DL
GLUCOSE SERPL-MCNC: 91 MG/DL (ref 65–99)
HCT VFR BLD AUTO: 38.8 % (ref 34–46.6)
HGB BLD-MCNC: 12.6 G/DL (ref 12–15.9)
LYMPHOCYTES # BLD AUTO: 0.82 10*3/MM3 (ref 0.7–3.1)
LYMPHOCYTES NFR BLD AUTO: 28.3 % (ref 19.6–45.3)
MCH RBC QN AUTO: 30.5 PG (ref 26.6–33)
MCHC RBC AUTO-ENTMCNC: 32.5 G/DL (ref 31.5–35.7)
MCV RBC AUTO: 93.9 FL (ref 79–97)
MONOCYTES # BLD AUTO: 0.35 10*3/MM3 (ref 0.1–0.9)
MONOCYTES NFR BLD AUTO: 12.1 % (ref 5–12)
NEUTROPHILS NFR BLD AUTO: 1.68 10*3/MM3 (ref 1.7–7)
NEUTROPHILS NFR BLD AUTO: 57.9 % (ref 42.7–76)
PLATELET # BLD AUTO: 80 10*3/MM3 (ref 140–450)
PMV BLD AUTO: 9.4 FL (ref 6–12)
POTASSIUM SERPL-SCNC: 4.1 MMOL/L (ref 3.5–5.2)
PROT SERPL-MCNC: 8.1 G/DL (ref 6–8.5)
RBC # BLD AUTO: 4.13 10*6/MM3 (ref 3.77–5.28)
SODIUM SERPL-SCNC: 140 MMOL/L (ref 136–145)
WBC # BLD AUTO: 2.9 10*3/MM3 (ref 3.4–10.8)

## 2021-08-13 PROCEDURE — 80053 COMPREHEN METABOLIC PANEL: CPT

## 2021-08-13 PROCEDURE — 85025 COMPLETE CBC W/AUTO DIFF WBC: CPT

## 2021-08-13 PROCEDURE — 36415 COLL VENOUS BLD VENIPUNCTURE: CPT

## 2021-08-13 NOTE — PROGRESS NOTES
MTM Note: Ibrance        8/13/2021   WBC 3.40 - 10.80 10*3/mm3 2.90 (A)   Neutrophils Absolute 1.70 - 7.00 10*3/mm3 1.68 (A)   Hemoglobin 12.0 - 15.9 g/dL 12.6   Hematocrit 34.0 - 46.6 % 38.8   Platelets 140 - 450 10*3/mm3 80 (A)   Creatinine 0.57 - 1.00 mg/dL 0.76   eGFR Non African Am >60 mL/min/1.73 78   BUN 6 - 20 mg/dL 18   Sodium 136 - 145 mmol/L 140   Potassium 3.5 - 5.2 mmol/L 4.1   Glucose 65 - 99 mg/dL 91   Calcium 8.6 - 10.5 mg/dL 9.7   Albumin 3.50 - 5.20 g/dL 4.50   Total Protein 6.0 - 8.5 g/dL 8.1   AST (SGOT) 1 - 32 U/L 28   ALT (SGPT) 1 - 33 U/L 31   Alkaline Phosphatase 39 - 117 U/L 95   Total Bilirubin 0.0 - 1.2 mg/dL 0.4     Patient's LFTs are WNL but platelets are low. Per Dr. Nunez, recheck cbc in one week and resume Ibrance when PLT are 100,000.  Also, receck cmp in two weeks after start to ensure that LFTs are not increasing from medication.  Called Gladys and let her know that she should hold medication until PLT increase.  Appointment made to recheck CBC next week on 8/19/21 and determine if ok to resume Ibrance.

## 2021-08-17 DIAGNOSIS — G89.3 CANCER ASSOCIATED PAIN: Primary | ICD-10-CM

## 2021-08-17 DIAGNOSIS — G89.3 CANCER ASSOCIATED PAIN: ICD-10-CM

## 2021-08-17 RX ORDER — CELECOXIB 200 MG/1
200 CAPSULE ORAL DAILY
Qty: 20 CAPSULE | Refills: 1 | Status: CANCELLED | OUTPATIENT
Start: 2021-08-17

## 2021-08-17 RX ORDER — CELECOXIB 200 MG/1
200 CAPSULE ORAL DAILY
Qty: 30 CAPSULE | Refills: 1 | Status: SHIPPED | OUTPATIENT
Start: 2021-08-17 | End: 2022-04-05 | Stop reason: HOSPADM

## 2021-08-17 RX ORDER — OXYCODONE HYDROCHLORIDE AND ACETAMINOPHEN 5; 325 MG/1; MG/1
1 TABLET ORAL EVERY 4 HOURS PRN
Qty: 60 TABLET | Refills: 0 | Status: SHIPPED | OUTPATIENT
Start: 2021-08-17 | End: 2021-08-31 | Stop reason: SDUPTHER

## 2021-08-17 RX ORDER — OXYCODONE HYDROCHLORIDE AND ACETAMINOPHEN 5; 325 MG/1; MG/1
1 TABLET ORAL TAKE AS DIRECTED
Qty: 60 TABLET | Refills: 1 | Status: CANCELLED | OUTPATIENT
Start: 2021-08-17

## 2021-08-17 RX ORDER — CYCLOBENZAPRINE HCL 10 MG
10 TABLET ORAL 2 TIMES DAILY PRN
Qty: 30 TABLET | Refills: 1 | Status: SHIPPED | OUTPATIENT
Start: 2021-08-17 | End: 2021-09-01 | Stop reason: SDUPTHER

## 2021-08-17 RX ORDER — CYCLOBENZAPRINE HCL 10 MG
10 TABLET ORAL 2 TIMES DAILY PRN
Qty: 30 TABLET | Refills: 1 | Status: CANCELLED | OUTPATIENT
Start: 2021-08-17

## 2021-08-18 ENCOUNTER — TELEPHONE (OUTPATIENT)
Dept: LAB | Facility: HOSPITAL | Age: 61
End: 2021-08-18

## 2021-08-19 ENCOUNTER — MEDICATION THERAPY MANAGEMENT (OUTPATIENT)
Dept: PHARMACY | Facility: HOSPITAL | Age: 61
End: 2021-08-19

## 2021-08-19 ENCOUNTER — LAB (OUTPATIENT)
Dept: LAB | Facility: HOSPITAL | Age: 61
End: 2021-08-19

## 2021-08-19 DIAGNOSIS — C50.919 MALIGNANT NEOPLASM OF FEMALE BREAST, UNSPECIFIED ESTROGEN RECEPTOR STATUS, UNSPECIFIED LATERALITY, UNSPECIFIED SITE OF BREAST (HCC): ICD-10-CM

## 2021-08-19 LAB
ALBUMIN SERPL-MCNC: 4.6 G/DL (ref 3.5–5.2)
ALBUMIN/GLOB SERPL: 1.4 G/DL
ALP SERPL-CCNC: 88 U/L (ref 39–117)
ALT SERPL W P-5'-P-CCNC: 28 U/L (ref 1–33)
ANION GAP SERPL CALCULATED.3IONS-SCNC: 13 MMOL/L (ref 5–15)
AST SERPL-CCNC: 33 U/L (ref 1–32)
BASOPHILS # BLD AUTO: 0.07 10*3/MM3 (ref 0–0.2)
BASOPHILS NFR BLD AUTO: 1.9 % (ref 0–1.5)
BILIRUB SERPL-MCNC: 0.6 MG/DL (ref 0–1.2)
BUN SERPL-MCNC: 17 MG/DL (ref 6–20)
BUN/CREAT SERPL: 23 (ref 7–25)
CALCIUM SPEC-SCNC: 9.2 MG/DL (ref 8.6–10.5)
CHLORIDE SERPL-SCNC: 105 MMOL/L (ref 98–107)
CO2 SERPL-SCNC: 21 MMOL/L (ref 22–29)
CREAT SERPL-MCNC: 0.74 MG/DL (ref 0.57–1)
DEPRECATED RDW RBC AUTO: 53.8 FL (ref 37–54)
EOSINOPHIL # BLD AUTO: 0.05 10*3/MM3 (ref 0–0.4)
EOSINOPHIL NFR BLD AUTO: 1.4 % (ref 0.3–6.2)
ERYTHROCYTE [DISTWIDTH] IN BLOOD BY AUTOMATED COUNT: 15.7 % (ref 12.3–15.4)
GFR SERPL CREATININE-BSD FRML MDRD: 81 ML/MIN/1.73
GLOBULIN UR ELPH-MCNC: 3.2 GM/DL
GLUCOSE SERPL-MCNC: 127 MG/DL (ref 65–99)
HCT VFR BLD AUTO: 40.3 % (ref 34–46.6)
HGB BLD-MCNC: 12.9 G/DL (ref 12–15.9)
LYMPHOCYTES # BLD AUTO: 1.25 10*3/MM3 (ref 0.7–3.1)
LYMPHOCYTES NFR BLD AUTO: 34.7 % (ref 19.6–45.3)
MCH RBC QN AUTO: 30.8 PG (ref 26.6–33)
MCHC RBC AUTO-ENTMCNC: 32 G/DL (ref 31.5–35.7)
MCV RBC AUTO: 96.2 FL (ref 79–97)
MONOCYTES # BLD AUTO: 0.56 10*3/MM3 (ref 0.1–0.9)
MONOCYTES NFR BLD AUTO: 15.6 % (ref 5–12)
NEUTROPHILS NFR BLD AUTO: 1.67 10*3/MM3 (ref 1.7–7)
NEUTROPHILS NFR BLD AUTO: 46.4 % (ref 42.7–76)
PLATELET # BLD AUTO: 108 10*3/MM3 (ref 140–450)
PMV BLD AUTO: 9.2 FL (ref 6–12)
POTASSIUM SERPL-SCNC: 4.2 MMOL/L (ref 3.5–5.2)
PROT SERPL-MCNC: 7.8 G/DL (ref 6–8.5)
RBC # BLD AUTO: 4.19 10*6/MM3 (ref 3.77–5.28)
SODIUM SERPL-SCNC: 139 MMOL/L (ref 136–145)
WBC # BLD AUTO: 3.6 10*3/MM3 (ref 3.4–10.8)

## 2021-08-19 PROCEDURE — 85025 COMPLETE CBC W/AUTO DIFF WBC: CPT

## 2021-08-19 PROCEDURE — 36415 COLL VENOUS BLD VENIPUNCTURE: CPT

## 2021-08-19 PROCEDURE — 80053 COMPREHEN METABOLIC PANEL: CPT

## 2021-08-19 NOTE — PROGRESS NOTES
[facility] Lab Review: Ibrance        8/19/2021   WBC 3.40 - 10.80 10*3/mm3 3.60   Neutrophils Absolute 1.70 - 7.00 10*3/mm3 1.67 (A)   Hemoglobin 12.0 - 15.9 g/dL 12.9   Hematocrit 34.0 - 46.6 % 40.3   Platelets 140 - 450 10*3/mm3 108 (A)     Platelets have increased. Per ELYSE Kitchen, patient can resume Ibrance. Called Gladys and she expressed understanding and will start her next cycle tomorrow, 8/20/21 through 9/921.

## 2021-08-20 ENCOUNTER — TELEPHONE (OUTPATIENT)
Dept: ONCOLOGY | Facility: CLINIC | Age: 61
End: 2021-08-20

## 2021-08-20 NOTE — TELEPHONE ENCOUNTER
Called patient to advise that clarisa Combs NP okay to watch and see if it grows or if she has pain than she can go to ER. She VU

## 2021-08-20 NOTE — TELEPHONE ENCOUNTER
Caller: LUCIEN    Relationship: PATIENT    Best call back number:[phone]     What is the best time to reach you: ANYTIME    What was the call regarding: SHE BROKE HER RIBS 4 WEEKS AGO. SHE JUST NOTICED A BIG BLACK BRUISE UNDER HER RIGHT ARM THAT WASN'T THERE 2 DAYS AGO, AND IS WANTING TO KNOW IF SHE SHOULD BE CONCERNED.     Do you require a callback: YES

## 2021-08-29 ENCOUNTER — HOSPITAL ENCOUNTER (EMERGENCY)
Facility: HOSPITAL | Age: 61
Discharge: LEFT BEFORE DISCHARGE | End: 2021-08-29
Attending: EMERGENCY MEDICINE

## 2021-08-29 VITALS
OXYGEN SATURATION: 96 % | DIASTOLIC BLOOD PRESSURE: 84 MMHG | BODY MASS INDEX: 29.3 KG/M2 | SYSTOLIC BLOOD PRESSURE: 152 MMHG | HEIGHT: 61 IN | HEART RATE: 92 BPM | RESPIRATION RATE: 18 BRPM | TEMPERATURE: 98.3 F | WEIGHT: 155.2 LBS

## 2021-08-29 PROCEDURE — 99211 OFF/OP EST MAY X REQ PHY/QHP: CPT | Performed by: EMERGENCY MEDICINE

## 2021-08-30 ENCOUNTER — TELEPHONE (OUTPATIENT)
Dept: FAMILY MEDICINE CLINIC | Facility: CLINIC | Age: 61
End: 2021-08-30

## 2021-08-30 ENCOUNTER — TELEPHONE (OUTPATIENT)
Dept: ONCOLOGY | Facility: CLINIC | Age: 61
End: 2021-08-30

## 2021-08-30 NOTE — TELEPHONE ENCOUNTER
Caller: Gladys Garrison    Relationship to patient: Self    Best call back number: [phone]    Date of exposure: SON HAS IT    Date of positive COVID19 test:     Date if possible COVID19 exposure:     COVID19 symptoms:  COUGH, FEVER, CHILLS, SHORTNESS OF BREATH  Date of initial quarantine:     Additional information or concerns:     What is the patients preferred pharmacy:[facility] [address] [place] [address] [address], [place]        WOULD LIKE A COVID TEST

## 2021-08-30 NOTE — TELEPHONE ENCOUNTER
Caller: Monroe Sargentangela L    Relationship to patient: Self    Best call back number: [phone]       Date of positive COVID19 test:HAS NOT BEEN TESTED     Date if possible COVID19 exposure: LAST WEEK    COVID19 symptoms: COUGH , GREEN MUCOUS, FEVER , ACHES       Additional information or concerns: MS. SARGENT IS WANTING TO KNOW IF THERE IS ANYTHING THAT SHE SHOULD BE DOING. HER SON JUST TESTED POSITIVE 8/30/2021    What is the patients preferred pharmacy:     [address] [place] [address] [facility] [address]  - [phone] [facility] [phone]   [phone]   up on this request. If your symptoms worsen, please seek emergent medical attention.”

## 2021-08-31 DIAGNOSIS — G89.3 CANCER ASSOCIATED PAIN: ICD-10-CM

## 2021-09-01 ENCOUNTER — HOSPITAL ENCOUNTER (OUTPATIENT)
Facility: HOSPITAL | Age: 61
Setting detail: OBSERVATION
Discharge: HOME | End: 2021-09-02
Attending: INTERNAL MEDICINE | Admitting: INTERNAL MEDICINE

## 2021-09-01 ENCOUNTER — APPOINTMENT (OUTPATIENT)
Dept: GENERAL RADIOLOGY | Facility: HOSPITAL | Age: 61
End: 2021-09-01

## 2021-09-01 ENCOUNTER — OFFICE VISIT (OUTPATIENT)
Dept: FAMILY MEDICINE CLINIC | Facility: CLINIC | Age: 61
End: 2021-09-01

## 2021-09-01 ENCOUNTER — APPOINTMENT (OUTPATIENT)
Dept: CT IMAGING | Facility: HOSPITAL | Age: 61
End: 2021-09-01

## 2021-09-01 VITALS
HEIGHT: 61 IN | WEIGHT: 153 LBS | OXYGEN SATURATION: 97 % | BODY MASS INDEX: 28.89 KG/M2 | SYSTOLIC BLOOD PRESSURE: 161 MMHG | RESPIRATION RATE: 22 BRPM | TEMPERATURE: 99.1 F | HEART RATE: 91 BPM | DIASTOLIC BLOOD PRESSURE: 96 MMHG

## 2021-09-01 DIAGNOSIS — R50.9 FEVER AND CHILLS: ICD-10-CM

## 2021-09-01 DIAGNOSIS — R06.02 SHORTNESS OF BREATH: Primary | ICD-10-CM

## 2021-09-01 DIAGNOSIS — Z92.29 COVID-19 VACCINE SERIES COMPLETED: ICD-10-CM

## 2021-09-01 DIAGNOSIS — C79.51 BREAST CANCER METASTASIZED TO BONE, UNSPECIFIED LATERALITY (HCC): ICD-10-CM

## 2021-09-01 DIAGNOSIS — C50.919 BREAST CANCER METASTASIZED TO BONE, UNSPECIFIED LATERALITY (HCC): ICD-10-CM

## 2021-09-01 DIAGNOSIS — U07.1 COVID-19: Primary | ICD-10-CM

## 2021-09-01 PROBLEM — E87.1 HYPONATREMIA: Status: ACTIVE | Noted: 2021-09-01

## 2021-09-01 PROBLEM — E66.9 OBESITY (BMI 30-39.9): Chronic | Status: RESOLVED | Noted: 2021-02-05 | Resolved: 2021-09-01

## 2021-09-01 PROBLEM — R74.01 ELEVATED AST (SGOT): Status: ACTIVE | Noted: 2021-09-01

## 2021-09-01 LAB
ALBUMIN SERPL-MCNC: 4.2 G/DL (ref 3.5–5.2)
ALBUMIN/GLOB SERPL: 1.1 G/DL
ALP SERPL-CCNC: 58 U/L (ref 39–117)
ALT SERPL W P-5'-P-CCNC: 28 U/L (ref 1–33)
ANION GAP SERPL CALCULATED.3IONS-SCNC: 14 MMOL/L (ref 5–15)
AST SERPL-CCNC: 46 U/L (ref 1–32)
B PARAPERT DNA SPEC QL NAA+PROBE: NOT DETECTED
B PERT DNA SPEC QL NAA+PROBE: NOT DETECTED
BASOPHILS # BLD AUTO: 0 10*3/MM3 (ref 0–0.2)
BASOPHILS NFR BLD AUTO: 0.2 % (ref 0–1.5)
BILIRUB SERPL-MCNC: 0.5 MG/DL (ref 0–1.2)
BUN SERPL-MCNC: 14 MG/DL (ref 6–20)
BUN/CREAT SERPL: 20.3 (ref 7–25)
C PNEUM DNA NPH QL NAA+NON-PROBE: NOT DETECTED
CALCIUM SPEC-SCNC: 8.6 MG/DL (ref 8.6–10.5)
CHLORIDE SERPL-SCNC: 97 MMOL/L (ref 98–107)
CHOLEST SERPL-MCNC: 120 MG/DL (ref 0–200)
CO2 SERPL-SCNC: 21 MMOL/L (ref 22–29)
CREAT SERPL-MCNC: 0.69 MG/DL (ref 0.57–1)
CRP SERPL-MCNC: 1.1 MG/DL (ref 0–0.5)
D DIMER PPP FEU-MCNC: 0.65 MG/L (FEU) (ref 0–0.59)
D-LACTATE SERPL-SCNC: 1.2 MMOL/L (ref 0.5–2)
DEPRECATED RDW RBC AUTO: 50.3 FL (ref 37–54)
EOSINOPHIL # BLD AUTO: 0 10*3/MM3 (ref 0–0.4)
EOSINOPHIL NFR BLD AUTO: 0.1 % (ref 0.3–6.2)
ERYTHROCYTE [DISTWIDTH] IN BLOOD BY AUTOMATED COUNT: 15.8 % (ref 12.3–15.4)
FERRITIN SERPL-MCNC: 435.7 NG/ML (ref 13–150)
FLUAV SUBTYP SPEC NAA+PROBE: NOT DETECTED
FLUBV RNA ISLT QL NAA+PROBE: NOT DETECTED
GFR SERPL CREATININE-BSD FRML MDRD: 87 ML/MIN/1.73
GLOBULIN UR ELPH-MCNC: 3.7 GM/DL
GLUCOSE SERPL-MCNC: 112 MG/DL (ref 65–99)
HADV DNA SPEC NAA+PROBE: NOT DETECTED
HCOV 229E RNA SPEC QL NAA+PROBE: NOT DETECTED
HCOV HKU1 RNA SPEC QL NAA+PROBE: NOT DETECTED
HCOV NL63 RNA SPEC QL NAA+PROBE: NOT DETECTED
HCOV OC43 RNA SPEC QL NAA+PROBE: NOT DETECTED
HCT VFR BLD AUTO: 38.8 % (ref 34–46.6)
HDLC SERPL-MCNC: 27 MG/DL (ref 40–60)
HGB BLD-MCNC: 13.1 G/DL (ref 12–15.9)
HMPV RNA NPH QL NAA+NON-PROBE: NOT DETECTED
HOLD SPECIMEN: NORMAL
HOLD SPECIMEN: NORMAL
HPIV1 RNA SPEC QL NAA+PROBE: NOT DETECTED
HPIV2 RNA SPEC QL NAA+PROBE: NOT DETECTED
HPIV3 RNA NPH QL NAA+PROBE: NOT DETECTED
HPIV4 P GENE NPH QL NAA+PROBE: NOT DETECTED
LDLC SERPL CALC-MCNC: 78 MG/DL (ref 0–100)
LDLC/HDLC SERPL: 2.89 {RATIO}
LYMPHOCYTES # BLD AUTO: 0.4 10*3/MM3 (ref 0.7–3.1)
LYMPHOCYTES NFR BLD AUTO: 24 % (ref 19.6–45.3)
M PNEUMO IGG SER IA-ACNC: NOT DETECTED
MAGNESIUM SERPL-MCNC: 1.7 MG/DL (ref 1.6–2.6)
MCH RBC QN AUTO: 30.4 PG (ref 26.6–33)
MCHC RBC AUTO-ENTMCNC: 33.7 G/DL (ref 31.5–35.7)
MCV RBC AUTO: 90.3 FL (ref 79–97)
MONOCYTES # BLD AUTO: 0.2 10*3/MM3 (ref 0.1–0.9)
MONOCYTES NFR BLD AUTO: 10.3 % (ref 5–12)
NEUTROPHILS NFR BLD AUTO: 1 10*3/MM3 (ref 1.7–7)
NEUTROPHILS NFR BLD AUTO: 65.4 % (ref 42.7–76)
NRBC BLD AUTO-RTO: 0.4 /100 WBC (ref 0–0.2)
NT-PROBNP SERPL-MCNC: 148 PG/ML (ref 0–900)
PLATELET # BLD AUTO: 96 10*3/MM3 (ref 140–450)
PMV BLD AUTO: 7.9 FL (ref 6–12)
POTASSIUM SERPL-SCNC: 4 MMOL/L (ref 3.5–5.2)
PROCALCITONIN SERPL-MCNC: 0.05 NG/ML (ref 0–0.25)
PROT SERPL-MCNC: 7.9 G/DL (ref 6–8.5)
RBC # BLD AUTO: 4.29 10*6/MM3 (ref 3.77–5.28)
RHINOVIRUS RNA SPEC NAA+PROBE: NOT DETECTED
RSV RNA NPH QL NAA+NON-PROBE: NOT DETECTED
SARS-COV-2 RNA PNL SPEC NAA+PROBE: DETECTED
SODIUM SERPL-SCNC: 132 MMOL/L (ref 136–145)
TRIGL SERPL-MCNC: 75 MG/DL (ref 0–150)
TROPONIN T SERPL-MCNC: <0.01 NG/ML (ref 0–0.03)
VLDLC SERPL-MCNC: 15 MG/DL (ref 5–40)
WBC # BLD AUTO: 1.6 10*3/MM3 (ref 3.4–10.8)
WHOLE BLOOD HOLD SPECIMEN: NORMAL
WHOLE BLOOD HOLD SPECIMEN: NORMAL

## 2021-09-01 PROCEDURE — 83605 ASSAY OF LACTIC ACID: CPT

## 2021-09-01 PROCEDURE — 93005 ELECTROCARDIOGRAM TRACING: CPT | Performed by: INTERNAL MEDICINE

## 2021-09-01 PROCEDURE — 83735 ASSAY OF MAGNESIUM: CPT | Performed by: NURSE PRACTITIONER

## 2021-09-01 PROCEDURE — 93005 ELECTROCARDIOGRAM TRACING: CPT | Performed by: NURSE PRACTITIONER

## 2021-09-01 PROCEDURE — 96374 THER/PROPH/DIAG INJ IV PUSH: CPT

## 2021-09-01 PROCEDURE — 84484 ASSAY OF TROPONIN QUANT: CPT | Performed by: NURSE PRACTITIONER

## 2021-09-01 PROCEDURE — 85379 FIBRIN DEGRADATION QUANT: CPT | Performed by: NURSE PRACTITIONER

## 2021-09-01 PROCEDURE — 96365 THER/PROPH/DIAG IV INF INIT: CPT

## 2021-09-01 PROCEDURE — 0 IOPAMIDOL PER 1 ML: Performed by: NURSE PRACTITIONER

## 2021-09-01 PROCEDURE — 25010000002 DEXAMETHASONE SODIUM PHOSPHATE 10 MG/ML SOLUTION: Performed by: NURSE PRACTITIONER

## 2021-09-01 PROCEDURE — 87635 SARS-COV-2 COVID-19 AMP PRB: CPT

## 2021-09-01 PROCEDURE — 93005 ELECTROCARDIOGRAM TRACING: CPT

## 2021-09-01 PROCEDURE — 71275 CT ANGIOGRAPHY CHEST: CPT

## 2021-09-01 PROCEDURE — 87040 BLOOD CULTURE FOR BACTERIA: CPT

## 2021-09-01 PROCEDURE — G0378 HOSPITAL OBSERVATION PER HR: HCPCS

## 2021-09-01 PROCEDURE — 83880 ASSAY OF NATRIURETIC PEPTIDE: CPT | Performed by: NURSE PRACTITIONER

## 2021-09-01 PROCEDURE — 85025 COMPLETE CBC W/AUTO DIFF WBC: CPT

## 2021-09-01 PROCEDURE — 96375 TX/PRO/DX INJ NEW DRUG ADDON: CPT

## 2021-09-01 PROCEDURE — 80061 LIPID PANEL: CPT | Performed by: NURSE PRACTITIONER

## 2021-09-01 PROCEDURE — 84145 PROCALCITONIN (PCT): CPT | Performed by: NURSE PRACTITIONER

## 2021-09-01 PROCEDURE — 80053 COMPREHEN METABOLIC PANEL: CPT

## 2021-09-01 PROCEDURE — C9803 HOPD COVID-19 SPEC COLLECT: HCPCS

## 2021-09-01 PROCEDURE — 82728 ASSAY OF FERRITIN: CPT | Performed by: NURSE PRACTITIONER

## 2021-09-01 PROCEDURE — 99213 OFFICE O/P EST LOW 20 MIN: CPT | Performed by: HOSPITALIST

## 2021-09-01 PROCEDURE — 71045 X-RAY EXAM CHEST 1 VIEW: CPT

## 2021-09-01 PROCEDURE — 86140 C-REACTIVE PROTEIN: CPT | Performed by: NURSE PRACTITIONER

## 2021-09-01 PROCEDURE — 99285 EMERGENCY DEPT VISIT HI MDM: CPT

## 2021-09-01 PROCEDURE — 87633 RESP VIRUS 12-25 TARGETS: CPT | Performed by: NURSE PRACTITIONER

## 2021-09-01 PROCEDURE — 99219 PR INITIAL OBSERVATION CARE/DAY 50 MINUTES: CPT | Performed by: NURSE PRACTITIONER

## 2021-09-01 RX ORDER — ONDANSETRON 2 MG/ML
4 INJECTION INTRAMUSCULAR; INTRAVENOUS EVERY 6 HOURS PRN
Status: DISCONTINUED | OUTPATIENT
Start: 2021-09-01 | End: 2021-09-02 | Stop reason: HOSPADM

## 2021-09-01 RX ORDER — INSULIN LISPRO 100 [IU]/ML
0-14 INJECTION, SOLUTION INTRAVENOUS; SUBCUTANEOUS
Status: DISCONTINUED | OUTPATIENT
Start: 2021-09-02 | End: 2021-09-02 | Stop reason: HOSPADM

## 2021-09-01 RX ORDER — CELECOXIB 200 MG/1
200 CAPSULE ORAL DAILY
Status: DISCONTINUED | OUTPATIENT
Start: 2021-09-02 | End: 2021-09-02 | Stop reason: HOSPADM

## 2021-09-01 RX ORDER — ONDANSETRON 4 MG/1
4 TABLET, FILM COATED ORAL EVERY 6 HOURS PRN
Status: DISCONTINUED | OUTPATIENT
Start: 2021-09-01 | End: 2021-09-02 | Stop reason: HOSPADM

## 2021-09-01 RX ORDER — SODIUM CHLORIDE 0.9 % (FLUSH) 0.9 %
10 SYRINGE (ML) INJECTION AS NEEDED
Status: DISCONTINUED | OUTPATIENT
Start: 2021-09-01 | End: 2021-09-02 | Stop reason: HOSPADM

## 2021-09-01 RX ORDER — ANASTROZOLE 1 MG/1
1 TABLET ORAL DAILY
Status: DISCONTINUED | OUTPATIENT
Start: 2021-09-02 | End: 2021-09-02

## 2021-09-01 RX ORDER — NICOTINE POLACRILEX 4 MG
15 LOZENGE BUCCAL
Status: DISCONTINUED | OUTPATIENT
Start: 2021-09-01 | End: 2021-09-02 | Stop reason: HOSPADM

## 2021-09-01 RX ORDER — ACETAMINOPHEN 500 MG
1000 TABLET ORAL ONCE
Status: COMPLETED | OUTPATIENT
Start: 2021-09-01 | End: 2021-09-01

## 2021-09-01 RX ORDER — ACETAMINOPHEN 325 MG/1
650 TABLET ORAL EVERY 4 HOURS PRN
Status: DISCONTINUED | OUTPATIENT
Start: 2021-09-01 | End: 2021-09-02 | Stop reason: HOSPADM

## 2021-09-01 RX ORDER — IBUPROFEN 600 MG/1
600 TABLET ORAL ONCE
Status: COMPLETED | OUTPATIENT
Start: 2021-09-01 | End: 2021-09-01

## 2021-09-01 RX ORDER — ACETAMINOPHEN 160 MG/5ML
650 SOLUTION ORAL EVERY 4 HOURS PRN
Status: DISCONTINUED | OUTPATIENT
Start: 2021-09-01 | End: 2021-09-02 | Stop reason: HOSPADM

## 2021-09-01 RX ORDER — INSULIN LISPRO 100 [IU]/ML
0-14 INJECTION, SOLUTION INTRAVENOUS; SUBCUTANEOUS AS NEEDED
Status: DISCONTINUED | OUTPATIENT
Start: 2021-09-01 | End: 2021-09-02 | Stop reason: HOSPADM

## 2021-09-01 RX ORDER — CYCLOBENZAPRINE HCL 10 MG
10 TABLET ORAL 2 TIMES DAILY PRN
Status: DISCONTINUED | OUTPATIENT
Start: 2021-09-01 | End: 2021-09-02 | Stop reason: HOSPADM

## 2021-09-01 RX ORDER — CHOLECALCIFEROL (VITAMIN D3) 125 MCG
5 CAPSULE ORAL NIGHTLY PRN
Status: DISCONTINUED | OUTPATIENT
Start: 2021-09-01 | End: 2021-09-02 | Stop reason: HOSPADM

## 2021-09-01 RX ORDER — MAGNESIUM SULFATE HEPTAHYDRATE 40 MG/ML
2 INJECTION, SOLUTION INTRAVENOUS AS NEEDED
Status: DISCONTINUED | OUTPATIENT
Start: 2021-09-01 | End: 2021-09-02 | Stop reason: HOSPADM

## 2021-09-01 RX ORDER — OXYCODONE HYDROCHLORIDE AND ACETAMINOPHEN 5; 325 MG/1; MG/1
1 TABLET ORAL EVERY 4 HOURS PRN
Qty: 60 TABLET | Refills: 0 | Status: SHIPPED | OUTPATIENT
Start: 2021-09-01 | End: 2021-09-01

## 2021-09-01 RX ORDER — ASPIRIN 81 MG/1
81 TABLET, CHEWABLE ORAL DAILY
Status: DISCONTINUED | OUTPATIENT
Start: 2021-09-02 | End: 2021-09-02 | Stop reason: HOSPADM

## 2021-09-01 RX ORDER — ACETAMINOPHEN 650 MG/1
650 SUPPOSITORY RECTAL EVERY 4 HOURS PRN
Status: DISCONTINUED | OUTPATIENT
Start: 2021-09-01 | End: 2021-09-02 | Stop reason: HOSPADM

## 2021-09-01 RX ORDER — MAGNESIUM SULFATE 1 G/100ML
1 INJECTION INTRAVENOUS AS NEEDED
Status: DISCONTINUED | OUTPATIENT
Start: 2021-09-01 | End: 2021-09-02 | Stop reason: HOSPADM

## 2021-09-01 RX ORDER — OLANZAPINE 10 MG/2ML
1 INJECTION, POWDER, LYOPHILIZED, FOR SOLUTION INTRAMUSCULAR AS NEEDED
Status: DISCONTINUED | OUTPATIENT
Start: 2021-09-01 | End: 2021-09-02 | Stop reason: HOSPADM

## 2021-09-01 RX ORDER — SODIUM CHLORIDE 0.9 % (FLUSH) 0.9 %
10 SYRINGE (ML) INJECTION EVERY 12 HOURS SCHEDULED
Status: DISCONTINUED | OUTPATIENT
Start: 2021-09-01 | End: 2021-09-02 | Stop reason: HOSPADM

## 2021-09-01 RX ORDER — DEXAMETHASONE SODIUM PHOSPHATE 10 MG/ML
6 INJECTION, SOLUTION INTRAMUSCULAR; INTRAVENOUS EVERY 24 HOURS
Status: DISCONTINUED | OUTPATIENT
Start: 2021-09-02 | End: 2021-09-02

## 2021-09-01 RX ORDER — ALUMINA, MAGNESIA, AND SIMETHICONE 2400; 2400; 240 MG/30ML; MG/30ML; MG/30ML
15 SUSPENSION ORAL EVERY 6 HOURS PRN
Status: DISCONTINUED | OUTPATIENT
Start: 2021-09-01 | End: 2021-09-02 | Stop reason: HOSPADM

## 2021-09-01 RX ORDER — NITROGLYCERIN 0.4 MG/1
0.4 TABLET SUBLINGUAL
Status: DISCONTINUED | OUTPATIENT
Start: 2021-09-01 | End: 2021-09-02 | Stop reason: HOSPADM

## 2021-09-01 RX ORDER — DEXTROSE MONOHYDRATE 25 G/50ML
25 INJECTION, SOLUTION INTRAVENOUS
Status: DISCONTINUED | OUTPATIENT
Start: 2021-09-01 | End: 2021-09-02 | Stop reason: HOSPADM

## 2021-09-01 RX ORDER — DEXAMETHASONE SODIUM PHOSPHATE 10 MG/ML
10 INJECTION, SOLUTION INTRAMUSCULAR; INTRAVENOUS ONCE
Status: COMPLETED | OUTPATIENT
Start: 2021-09-01 | End: 2021-09-01

## 2021-09-01 RX ORDER — ROSUVASTATIN CALCIUM 10 MG/1
20 TABLET, COATED ORAL NIGHTLY
Status: DISCONTINUED | OUTPATIENT
Start: 2021-09-02 | End: 2021-09-02 | Stop reason: HOSPADM

## 2021-09-01 RX ORDER — LISINOPRIL 20 MG/1
20 TABLET ORAL DAILY
Status: DISCONTINUED | OUTPATIENT
Start: 2021-09-02 | End: 2021-09-02 | Stop reason: HOSPADM

## 2021-09-01 RX ORDER — POTASSIUM CHLORIDE 20 MEQ/1
40 TABLET, EXTENDED RELEASE ORAL AS NEEDED
Status: DISCONTINUED | OUTPATIENT
Start: 2021-09-01 | End: 2021-09-02 | Stop reason: HOSPADM

## 2021-09-01 RX ADMIN — IOPAMIDOL 100 ML: 755 INJECTION, SOLUTION INTRAVENOUS at 18:24

## 2021-09-01 RX ADMIN — REMDESIVIR 200 MG: 100 INJECTION, POWDER, LYOPHILIZED, FOR SOLUTION INTRAVENOUS at 20:54

## 2021-09-01 RX ADMIN — IBUPROFEN 600 MG: 600 TABLET ORAL at 15:36

## 2021-09-01 RX ADMIN — DEXAMETHASONE SODIUM PHOSPHATE 10 MG: 10 INJECTION, SOLUTION INTRAMUSCULAR; INTRAVENOUS at 17:02

## 2021-09-01 RX ADMIN — ACETAMINOPHEN 1000 MG: 500 TABLET, FILM COATED ORAL at 15:36

## 2021-09-01 NOTE — ED PROVIDER NOTES
Subjective   Patient is a 61-year-old female who is fully vaccinated for COVID-19 who states she has had dyspnea for 1 week.  She has a history of diabetes heart disease hypertension she also states that she has stage IV breast cancer with metastasis to the lumbar spine.  She sees Dr. Finkfor her cancer care.  She states that she has also had fever at home she is unsure of how high it was she has some chills and body aches and pains.  Rates her pain a 7/10.  States it is diffuse nonradiating nothing makes it better or worse          Review of Systems   Constitutional: Negative for chills, fatigue and fever.   HENT: Positive for congestion. Negative for tinnitus and trouble swallowing.    Eyes: Negative for photophobia, discharge and redness.   Respiratory: Positive for cough and shortness of breath.    Cardiovascular: Negative for chest pain and palpitations.   Gastrointestinal: Negative for abdominal pain, diarrhea, nausea and vomiting.   Genitourinary: Negative for dysuria, frequency and urgency.   Musculoskeletal: Positive for arthralgias. Negative for back pain, joint swelling and myalgias.   Skin: Negative for rash.   Neurological: Negative for dizziness and headaches.   Psychiatric/Behavioral: Negative for confusion.   All other systems reviewed and are negative.      Past Medical History:   Diagnosis Date   • Allergic    • Breast cancer (CMS/HCC) 2017    mets to lymph nodes; did not do radiation   • Cancer of unknown origin (CMS/HCC)    • Compression fx, thoracic spine, open, initial encounter (CMS/HCC)    • Coronary artery disease    • Diabetes mellitus (CMS/HCC)    • Heart disease, unspecified    • Hepatitis C    • Hypertension    • Obesity (BMI 30-39.9) 2/5/2021   • Sleep apnea     no machine   • Type 2 diabetes mellitus (CMS/HCC) 11/2017       Allergies   Allergen Reactions   • Promethazine Mental Status Change   • Tape Other (See Comments)     .blisters         Past Surgical History:   Procedure  Laterality Date   • BACK SURGERY      neck   • BREAST RECONSTRUCTION Bilateral    • CARDIAC ABLATION       atrial tachycardia x 5 ablations    • CARDIAC CATHETERIZATION     • CARDIAC SURGERY      stent placed in aorta   • CARDIAC SURGERY      6 surgeries as baby    • CERVICAL FUSION ANTERIOR WITH ARTIFICIAL DISCECTOMY IMPLANTATION N/A 2020    Procedure: C4 VERTEBRECTOMY AND ANTERIOR CERIVCAL DISCECTOMY WITH FUSION OF CERVICAL THREE THROUGH FIVE WITH REMOVAL OF HARDWARE C5-C6;  Surgeon: Mark Kaba MD;  Location: [facility] MAIN OR;  Service: Neurosurgery;  Laterality: N/A;   •  SECTION      x2   • COLONOSCOPY N/A 10/23/2020    Procedure: COLONOSCOPY WITH POLYPECTOMY X6;  Surgeon: Stanley Bourne MD;  Location: [facility] ENDOSCOPY;  Service: Gastroenterology;  Laterality: N/A;  POLYPS, INTERNAL HEMORRHOIDS   • ENDOSCOPY N/A 10/23/2020    Procedure: ESOPHAGOGASTRODUODENOSCOPY WITH BIOPSY X 1 AREA;  Surgeon: Stanley Bourne MD;  Location: [facility] ENDOSCOPY;  Service: Gastroenterology;  Laterality: N/A;  GASTRITIS, ESOPHAGITIS, HIATAL HERNIA   • KNEE SURGERY     • MASTECTOMY Bilateral    • NECK SURGERY     • PACEMAKER IMPLANTATION         Family History   Problem Relation Age of Onset   • Heart disease Mother    • Stroke Mother    • Lung cancer Mother    • Aneurysm Father    • Diabetes Sister    • No Known Problems Brother    • No Known Problems Brother    • Diabetes type I Half-Sister    • Thyroid cancer Half-Sister    • Cancer Maternal Aunt    • Heart attack Sister    • Thyroid disease Sister    • No Known Problems Sister        Social History     Socioeconomic History   • Marital status:      Spouse name: Not on file   • Number of children: 2   • Years of education: Not on file   • Highest education level: Not on file   Tobacco Use   • Smoking status: Never Smoker   • Smokeless tobacco: Never Used   Vaping Use   • Vaping Use: Never used   Substance and Sexual Activity    • Alcohol use: Yes     Alcohol/week: 1.0 standard drinks     Types: 1 Glasses of wine per week     Comment: rare   • Drug use: Not Currently   • Sexual activity: Defer           Objective   Physical Exam  Vitals reviewed.   Constitutional:       Appearance: She is well-developed. She is obese.   HENT:      Head: Normocephalic and atraumatic.      Mouth/Throat:      Mouth: Mucous membranes are moist.      Pharynx: Oropharynx is clear.   Eyes:      Conjunctiva/sclera: Conjunctivae normal.      Pupils: Pupils are equal, round, and reactive to light.   Cardiovascular:      Rate and Rhythm: Normal rate and regular rhythm.      Pulses: Normal pulses.      Heart sounds: Normal heart sounds.   Pulmonary:      Effort: Pulmonary effort is normal. No respiratory distress.      Breath sounds: Examination of the right-lower field reveals decreased breath sounds and wheezing. Examination of the left-lower field reveals decreased breath sounds. Decreased breath sounds and wheezing present. No rhonchi or rales.   Chest:      Chest wall: No tenderness.   Abdominal:      General: Bowel sounds are normal. There is no distension.      Palpations: Abdomen is soft. There is no mass.      Tenderness: There is no abdominal tenderness. There is no guarding or rebound.   Musculoskeletal:         General: No deformity. Normal range of motion.      Cervical back: Normal range of motion and neck supple.   Skin:     General: Skin is warm and dry.      Capillary Refill: Capillary refill takes less than 2 seconds.          Neurological:      Mental Status: She is alert and oriented to person, place, and time.      GCS: GCS eye subscore is 4. GCS verbal subscore is 5. GCS motor subscore is 6.      Cranial Nerves: No cranial nerve deficit.      Sensory: No sensory deficit.      Deep Tendon Reflexes: Reflexes normal.   Psychiatric:         Mood and Affect: Mood normal.         Behavior: Behavior normal.         Procedures       EKG shows a  "ventricularly paced rhythm at a rate of 85 this was reviewed by myself and read by Dr. Le and compared to previous dated 7/6/2021    ED Course  ED Course as of Sep 01 1925   Wed Sep 01, 2021   1528 Temperature recheck was found to be 102.6    [KW]   1643 Waiting for patient to go to CAT scan for CT PE protocol    [KW]   1922 Patient was discussed with Riya the nurse practitioner and admitted to Dr. Martel the hospitalist    [KW]      ED Course User Index  [KW] Yolanda Edward, APRN      /80   Pulse 80   Temp 100.5 °F (38.1 °C) (Rectal)   Resp 22   Ht 154.9 cm (61\")   Wt 69.6 kg (153 lb 7 oz)   LMP  (LMP Unknown)   SpO2 98%   Breastfeeding No   BMI 28.99 kg/m²   Labs Reviewed   COVID-19,CEPHEID/CHANDRA/BDMAX,COR/[facility]/PAD/MELINDA IN-HOUSE,NP SWAB IN TRANSPORT MEDIA 3-4 HR TAT, RT-PCR - Abnormal; Notable for the following components:       Result Value    COVID19 Detected (*)     All other components within normal limits    Narrative:     Fact sheet for providers: https://www.fda.gov/media/544382/download     Fact sheet for patients: https://www.fda.gov/media/090806/download  Fact sheet for providers: https://www.fda.gov/media/333187/download    Fact sheet for patients: https://www.fda.gov/media/603083/download    Test performed by PCR.   COMPREHENSIVE METABOLIC PANEL - Abnormal; Notable for the following components:    Glucose 112 (*)     Sodium 132 (*)     Chloride 97 (*)     CO2 21.0 (*)     AST (SGOT) 46 (*)     All other components within normal limits    Narrative:     GFR Normal >60  Chronic Kidney Disease <60  Kidney Failure <15     CBC WITH AUTO DIFFERENTIAL - Abnormal; Notable for the following components:    WBC 1.60 (*)     RDW 15.8 (*)     Platelets 96 (*)     Eosinophil % 0.1 (*)     Neutrophils, Absolute 1.00 (*)     Lymphocytes, Absolute 0.40 (*)     nRBC 0.4 (*)     All other components within normal limits    Narrative:     Appended report. These results have been appended to a " previously verified report.   D-DIMER, QUANTITATIVE - Abnormal; Notable for the following components:    D-Dimer, Quantitative 0.65 (*)     All other components within normal limits    Narrative:     Reference Range  --------------------------------------------------------------------     < 0.50   Negative Predictive Value  0.50-0.59   Indeterminate    >= 0.60   Probable VTE             A very low percentage of patients with DVT may yield D-Dimer results   below the cut-off of 0.50 mg/L FEU.  This is known to be more   prevalent in patients with distal DVT.             Results of this test should always be interpreted in conjunction with   the patient's medical history, clinical presentation and other   findings.  Clinical diagnosis should not be based on the result of   INNOVANCE D-Dimer alone.   POC LACTATE - Normal   BLOOD CULTURE   BLOOD CULTURE   RAINBOW DRAW    Narrative:     The following orders were created for panel order [place] Draw.  Procedure                               Abnormality         Status                     ---------                               -----------         ------                     Green Top (Gel)[[id number]]                                  Final result               Lavender Top[[id number]]                                     Final result               Gold Top - SST[[id number]]                                   Final result               Light Blue Top[[id number]]                                   Final result                 Please view results for these tests on the individual orders.   BNP (IN-HOUSE)   POC LACTATE   CBC AND DIFFERENTIAL    Narrative:     The following orders were created for panel order CBC & Differential.  Procedure                               Abnormality         Status                     ---------                               -----------         ------                     CBC Auto Differential[[id number]]        Abnormal            Final result                Scan Slide[[id number]]                                                                    Please view results for these tests on the individual orders.   GREEN TOP   LAVENDER TOP   GOLD TOP - SST   LIGHT BLUE TOP     Medications   sodium chloride 0.9 % flush 10 mL (has no administration in time range)   sodium chloride 0.9 % flush 10 mL (has no administration in time range)   Pharmacy Consult - Remdesivir (has no administration in time range)   acetaminophen (TYLENOL) tablet 1,000 mg (1,000 mg Oral Given 9/1/21 1536)   ibuprofen (ADVIL,MOTRIN) tablet 600 mg (600 mg Oral Given 9/1/21 1536)   dexamethasone sodium phosphate injection 10 mg (10 mg Intravenous Given 9/1/21 1702)   iopamidol (ISOVUE-370) 76 % injection 100 mL (100 mL Intravenous Given 9/1/21 1824)     XR Chest 1 View    Result Date: 9/1/2021  1.Hazy bilateral airspace opacities, which could reflect pneumonia or pulmonary edema. 2.Cardiomegaly.  Electronically Signed By-Brian Buckner MD On:9/1/2021 3:39 PM This report was finalized on [id number] by  Brian Buckner MD.    CT Chest Pulmonary Embolism    Result Date: 9/1/2021  1. Negative for pulmonary embolus. 2. There are some new mild lung opacities which could be due to pneumonia. There are some stable lung abnormalities consistent with scarring. There has been interval resolution of some other lung opacities. 4. There is again noted to be cardiomegaly and enlargement of the pulmonary arteries. 4. Additional findings as reported above.  Electronically Signed By-Raquel Zhao MD On:9/1/2021 6:45 PM This report was finalized on [id number] by  Raquel Zhao MD.                                         MDM  Number of Diagnoses or Management Options  Breast cancer metastasized to bone, unspecified laterality (CMS/HCC)  COVID-19 vaccine series completed  COVID-19  Fever and chills  Diagnosis management comments: Patient had IV established and blood work was obtained.  Patient was found to have a  temperature of 102.6.  She was treated with Tylenol and Motrin.  Her white count was found to be 1.6 this is consistent with her known breast cancer.  She was found to be Covid positive though she is fully vaccinated.-She was given Decadron and the case was discussed with Dr. Le and the patient will be given remdesivir at this time as well-patient was found to have a positive dimer and subsequent CT PE protocol showed no pulmonary emboli but did show some mild opacities which could be pneumonia this was discussed with Riya the nurse practitioner with the hospitalist and the patient was admitted to Dr. Osei.  I did discuss with Riya that I would leave it up to her whether she started antibiotics at this time.  She was agreeable to this plan of care    Patient was told of the need for admission and was agreeable as well.       Amount and/or Complexity of Data Reviewed  Clinical lab tests: reviewed  Tests in the radiology section of CPT®: reviewed  Tests in the medicine section of CPT®: reviewed    Risk of Complications, Morbidity, and/or Mortality  Presenting problems: low  Diagnostic procedures: low  Management options: moderate    Patient Progress  Patient progress: stable      Final diagnoses:   COVID-19   Breast cancer metastasized to bone, unspecified laterality (CMS/HCC)   Fever and chills   COVID-19 vaccine series completed       ED Disposition  ED Disposition     ED Disposition Condition Comment    Decision to Admit  Level of Care: Telemetry [5]   Diagnosis: COVID-19 [[id number]]   Admitting Physician: ALVAREZ OSEI [[id number]]   Attending Physician: ALVAREZ OSEI [[id number]]            No follow-up provider specified.       Medication List      No changes were made to your prescriptions during this visit.          Yolanda Edward, APRN  09/01/21 1925

## 2021-09-01 NOTE — H&P
[facility] Medicine Services      Patient Name: Gladys Garrison  : 1960  MRN: [id number]  Primary Care Physician:  Angelica Rodriguez MD  Date of admission: 2021      Subjective      Chief Complaint: Shortness of air, cough    History of Present Illness: Gladys Garrison is a 61 y.o. female with past medical history of CABG, breast cancer with mets, diabetes type 2 who presented to [facility] on 2021 complaining of shortness of air, productive cough with yellow/green sputum.  Patient stated symptoms started about 1 week ago.  She complained of fatigue, weakness, nausea, chills, midsternal chest pressure.  Patient denies vomiting, fever, body aches.  Patient had received Covid vaccine. Dr. Nunez manages patient's breast cancer with mets.    In the ED, CT for PE protocol showed negative for pulmonary embolus; some new mild lung opacities which could be due to pneumonia; cardiomegaly and enlargement of the pulmonary arteries.  Chest x-ray showed hazy bilateral airspace opacities, which could reflect pneumonia or pulmonary edema; cardiomegaly.  Positive for COVID-19.  Blood cultures pending.  EKG showed ventricular paced rhythm.  All labs unremarkable except WBC 1.6, platelets 96, D-dimer 0.65,  Sodium 132, chloride 97, AST 46.  All vital signs unremarkable except temperature 100.2, blood pressure 132/102 on admission.  Patient given Tylenol, Decadron, Motrin, remdesivir in the ED.  Patient admitted to hospitalist group for further evaluation and treatment.    Review of Systems   Constitutional: Positive for chills and malaise/fatigue. Negative for fever.   HENT: Negative.    Eyes: Negative.    Cardiovascular: Negative.  Negative for chest pain.   Respiratory: Positive for cough and shortness of breath.    Endocrine: Negative.    Skin: Negative.    Musculoskeletal: Negative.    Gastrointestinal: Positive for nausea. Negative for vomiting.   Genitourinary: Negative.     Neurological: Positive for weakness.   Psychiatric/Behavioral: Negative.    Allergic/Immunologic: Negative.        Personal History     Past Medical History:   Diagnosis Date   • Allergic    • Breast cancer (CMS/HCC) 2017    mets to lymph nodes; did not do radiation   • Cancer of unknown origin (CMS/HCC)    • Compression fx, thoracic spine, open, initial encounter (CMS/HCC)    • Coronary artery disease    • Diabetes mellitus (CMS/HCC)    • Heart disease, unspecified    • Hepatitis C    • Hypertension    • Obesity (BMI 30-39.9) 2021   • Sleep apnea     no machine   • Type 2 diabetes mellitus (CMS/HCC) 2017       Past Surgical History:   Procedure Laterality Date   • BACK SURGERY      neck   • BREAST RECONSTRUCTION Bilateral    • CARDIAC ABLATION       atrial tachycardia x 5 ablations    • CARDIAC CATHETERIZATION     • CARDIAC SURGERY      stent placed in aorta   • CARDIAC SURGERY      6 surgeries as baby    • CERVICAL FUSION ANTERIOR WITH ARTIFICIAL DISCECTOMY IMPLANTATION N/A 2020    Procedure: C4 VERTEBRECTOMY AND ANTERIOR CERIVCAL DISCECTOMY WITH FUSION OF CERVICAL THREE THROUGH FIVE WITH REMOVAL OF HARDWARE C5-C6;  Surgeon: Mark Kaba MD;  Location: [facility] MAIN OR;  Service: Neurosurgery;  Laterality: N/A;   •  SECTION      x2   • COLONOSCOPY N/A 10/23/2020    Procedure: COLONOSCOPY WITH POLYPECTOMY X6;  Surgeon: Stanley Bourne MD;  Location: [facility] ENDOSCOPY;  Service: Gastroenterology;  Laterality: N/A;  POLYPS, INTERNAL HEMORRHOIDS   • ENDOSCOPY N/A 10/23/2020    Procedure: ESOPHAGOGASTRODUODENOSCOPY WITH BIOPSY X 1 AREA;  Surgeon: Stanley Bourne MD;  Location: [facility] ENDOSCOPY;  Service: Gastroenterology;  Laterality: N/A;  GASTRITIS, ESOPHAGITIS, HIATAL HERNIA   • KNEE SURGERY     • MASTECTOMY Bilateral    • NECK SURGERY     • PACEMAKER IMPLANTATION         Family History: family history includes Aneurysm in her father; Cancer in her maternal aunt;  Diabetes in her sister; Diabetes type I in her half-sister; Heart attack in her sister; Heart disease in her mother; Lung cancer in her mother; No Known Problems in her brother, brother, and sister; Stroke in her mother; Thyroid cancer in her half-sister; Thyroid disease in her sister. Otherwise pertinent FHx was reviewed and not pertinent to current issue.    Social History:  reports that she has never smoked. She has never used smokeless tobacco. She reports current alcohol use of about 1.0 standard drinks of alcohol per week. She reports previous drug use.    Home Medications:  Prior to Admission Medications     Prescriptions Last Dose Informant Patient Reported? Taking?    Accu-Chek FastClix Lancets misc   No No    For finger stick 4x/d    Alcohol Swabs 70 % pads   No No    Use tid    anastrozole (ARIMIDEX) 1 MG tablet   No No    Take 1 tablet by mouth Daily.    aspirin 81 MG chewable tablet   No No    Chew 1 tablet Daily.    Patient taking differently:  Chew 81 mg Daily. Hold DOS    Blood Glucose Monitoring Suppl (ACCU-CHEK TEODORO PLUS) w/Device kit   No No    Use as directed   DX  E11.9    Calcium Carbonate-Vit D-Min (Calcium 600+D Plus Minerals) 600-400 MG-UNIT chewable tablet   No No    Chew 600 mg 2 (Two) Times a Day.    celecoxib (CeleBREX) 200 MG capsule   No No    Take 1 capsule by mouth Daily. Take one tablet in the am    celecoxib (CeleBREX) 200 MG capsule   No No    Take 1 capsule by mouth Daily.    Continuous Blood Gluc Sensor (FreeStyle Rajeev 14 Day Sensor) misc   No No    1 each Every 14 (Fourteen) Days.    cyclobenzaprine (FLEXERIL) 10 MG tablet   No No    Take 1 tablet by mouth 2 (Two) Times a Day As Needed for Muscle Spasms.    cyclobenzaprine (FLEXERIL) 10 MG tablet   No No    Take 1 tablet by mouth 2 (Two) Times a Day As Needed for Muscle Spasms.    glucose blood (Accu-Chek Teodoro Plus) test strip   No No    To check blood sugar 4x/d    insulin NPH-insulin regular (humuLIN 70/30,novoLIN 70/30)  "(70-30) 100 UNIT/ML injection   Yes No    Inject 40 Units under the skin into the appropriate area as directed Every Morning.    insulin NPH-insulin regular (humuLIN 70/30,novoLIN 70/30) (70-30) 100 UNIT/ML injection   Yes No    Inject 30 Units under the skin into the appropriate area as directed Every Evening.    insulin NPH-insulin regular (NovoLIN 70/30) (70-30) 100 UNIT/ML injection   Yes No    Inject  under the skin into the appropriate area as directed.    Insulin Syringe 31G X 5/16\" 1 ML misc   No No    1 syringe 2 (Two) Times a Day.    lidocaine (LIDODERM) 5 %   No No    Place 1 patch on the skin as directed by provider Daily. Remove & Discard patch within 12 hours or as directed by MD    lisinopril (PRINIVIL,ZESTRIL) 20 MG tablet   Yes No    Take 20 mg by mouth Daily.    ondansetron (ZOFRAN) 8 MG tablet   No No    Take 1 tablet by mouth 3 (Three) Times a Day As Needed for Nausea or Vomiting.    oxyCODONE-acetaminophen (PERCOCET) 5-325 MG per tablet   No No    Take 1 tablet by mouth Take As Directed. Take 1 tablet by mouth every 4-6 hours prn pain    oxyCODONE-acetaminophen (PERCOCET) 5-325 MG per tablet   No No    Take 1 tablet by mouth Every 4 (Four) Hours As Needed for Moderate Pain .    Palbociclib 125 MG tablet   Yes No    Take 125 mg by mouth Daily.    prochlorperazine (COMPAZINE) 10 MG tablet   No No    Take 1 tablet by mouth Every 6 (Six) Hours As Needed for Nausea or Vomiting.    rosuvastatin (Crestor) 20 MG tablet   No No    Take 1 tablet by mouth Every Night.            Allergies:  Allergies   Allergen Reactions   • Promethazine Mental Status Change   • Tape Other (See Comments)     .blisters         Objective      Vitals:   Temp:  [99.1 °F (37.3 °C)-100.5 °F (38.1 °C)] 100.5 °F (38.1 °C)  Heart Rate:  [79-98] 79  Resp:  [18-22] 18  BP: (110-161)/() 110/65  Flow (L/min):  [2] 2    Physical Exam  Vitals and nursing note reviewed.   Constitutional:       Appearance: Normal appearance.   HENT: "      Head: Normocephalic.      Nose: Nose normal.      Mouth/Throat:      Pharynx: Oropharynx is clear.   Eyes:      Extraocular Movements: Extraocular movements intact.   Cardiovascular:      Rate and Rhythm: Normal rate and regular rhythm.      Pulses: Normal pulses.      Heart sounds: Murmur heard.     Pulmonary:      Effort: Pulmonary effort is normal.      Comments: Decreased lung sounds noted throughout  Abdominal:      General: Bowel sounds are normal.      Palpations: Abdomen is soft.   Musculoskeletal:         General: Normal range of motion.      Cervical back: Normal range of motion.   Skin:     General: Skin is warm and dry.   Neurological:      Mental Status: She is alert and oriented to person, place, and time.   Psychiatric:         Mood and Affect: Mood normal.         Behavior: Behavior normal.         Result Review    Result Review:  I have personally reviewed the results from the time of this admission to 9/1/2021 21:34 EDT and agree with these findings:  [x]  Laboratory  [x]  Microbiology  [x]  Radiology  [x]  EKG/Telemetry   []  Cardiology/Vascular   []  Pathology  []  Old records  []  Other:  Most notable findings include: As above      Assessment/Plan        Active Hospital Problems:  Active Hospital Problems    Diagnosis    • **COVID-19    • Elevated AST (SGOT)    • Hyponatremia    • Thrombocytopenia (CMS/HCC)    • Systolic congestive heart failure (CMS/HCC)    • Bone metastasis (CMS/HCC)    • Malignant neoplasm of female breast (CMS/HCC)    • Controlled type 2 diabetes mellitus without complication, with long-term current use of insulin (CMS/HCC)    • Hyperlipidemia    • Osteoarthritis of multiple joints    • Hypertensive disorder      Plan:     COVID-19  -CT per PE protocol reviewed  -Chest x-ray reviewed  -Respiratory panel ordered  -Continuous cardiac monitoring and continuous pulse ox  -WBC 1.6  -D-dimer 0.65  -CRP, ferritin, procalcitonin, IL-6, magnesium, troponin ordered  -Tessalon,  ProAir inhaler ordered  -Decadron, remdesivir given in the ED, continue   -Incentive spirometry ordered  -Patient on 2 L per nasal cannula    Hyperlipidemia  Hypertensive disorder  -Blood pressure well controlled  -Lipid panel ordered  -Continue home lisinopril  -Continue home Crestor    Controlled type 2 diabetes mellitus without complication, with long-term current use of insulin  -Hemoglobin 9 on 6/14/2021  -Accu-Cheks before meals and at bedtime, sliding scale insulin ordered  -Continue home NPH insulin     Malignant neoplasm of female breast   Bone metastasis   -Dr. Nunez managing  -Continue home anastrozole   -Hold home palbociclib, patient has not taken it in a week    Osteoarthritis  -Continue home Celebrex  -Continue home Flexeril    Thrombocytopenia  -Platelet 96, monitor    Elevated AST  -AST 46, monitor    Hyponatremia  -Sodium 132, monitor    Systolic CHF  -Stable, not acute  -    DVT prophylaxis:  Mechanical DVT prophylaxis orders are present.    CODE STATUS:    Level Of Support Discussed With: Patient  Code Status: CPR  Medical Interventions (Level of Support Prior to Arrest): Full    Admission Status:  I believe this patient meets observation status.    I discussed the patient's findings and my recommendations with patient.    This patient has been examined wearing appropriate Personal Protective Equipment. 09/01/21      Signature: Electronically signed by CIRO Butts, 09/01/21, 9:33 PM EDT.

## 2021-09-01 NOTE — PROGRESS NOTES
"Subjective   Gladys Garrison is a 61 y.o. female.     Subjective / HPI  Patient says she has been feeling short of breath for last five days, progressively worse. There has been family members diagnosed with covid recently. Patient carries diagnosed of ca breast with ongoing chemo. Recently has rib fracture almost six week ago. Patient noted in mild respiratory distress and obvious discomfort. Patient  here and advised to take to ER for further evaluation.    Review of Systems    Objective     /96 (BP Location: Right arm, Patient Position: Sitting, Cuff Size: Adult)   Pulse 91   Temp 99.1 °F (37.3 °C) (Oral)   Resp 22   Ht 154.9 cm (61\")   Wt 69.4 kg (153 lb)   LMP  (LMP Unknown)   SpO2 97%   BMI 28.91 kg/m²      Physical Exam  Chest .. bilateral entry, crackles in both bases  CVS ... s1s2 no murmur    Procedures       Assessment/Plan   Diagnoses and all orders for this visit:    1. Shortness of breath (Primary)  Comments:  patient advised to go ER for further evaluation.                 "

## 2021-09-01 NOTE — TELEPHONE ENCOUNTER
Caller: Gladys Garrison    Relationship: Self    Best call back number: [phone]    Medication needed:   Requested Prescriptions     Pending Prescriptions Disp Refills   • oxyCODONE-acetaminophen (PERCOCET) 5-325 MG per tablet 60 tablet 0     Sig: Take 1 tablet by mouth Every 4 (Four) Hours As Needed for Moderate Pain .       When do you need the refill by: 9/1/21    What additional details did the patient provide when requesting the medication: PATIENT IS OUT    Does the patient have less than a 3 day supply:  [x] Yes  [] No    What is the patient's preferred pharmacy:        Pharmacy    [facility]  [place], [place] - [address] - [phone] [place] [phone]    [address]  [place] [address]   Phone:  [phone]  Fax:  [phone]

## 2021-09-02 ENCOUNTER — READMISSION MANAGEMENT (OUTPATIENT)
Dept: CALL CENTER | Facility: HOSPITAL | Age: 61
End: 2021-09-02

## 2021-09-02 VITALS
HEIGHT: 61 IN | HEART RATE: 70 BPM | BODY MASS INDEX: 28.97 KG/M2 | DIASTOLIC BLOOD PRESSURE: 80 MMHG | RESPIRATION RATE: 18 BRPM | OXYGEN SATURATION: 95 % | SYSTOLIC BLOOD PRESSURE: 116 MMHG | TEMPERATURE: 99.8 F | WEIGHT: 153.44 LBS

## 2021-09-02 LAB
ALBUMIN SERPL-MCNC: 4 G/DL (ref 3.5–5.2)
ALBUMIN/GLOB SERPL: 1.1 G/DL
ALP SERPL-CCNC: 59 U/L (ref 39–117)
ALT SERPL W P-5'-P-CCNC: 25 U/L (ref 1–33)
ANION GAP SERPL CALCULATED.3IONS-SCNC: 12 MMOL/L (ref 5–15)
ANISOCYTOSIS BLD QL: ABNORMAL
AST SERPL-CCNC: 34 U/L (ref 1–32)
BILIRUB CONJ SERPL-MCNC: 0.2 MG/DL (ref 0–0.3)
BILIRUB SERPL-MCNC: 0.5 MG/DL (ref 0–1.2)
BUN SERPL-MCNC: 18 MG/DL (ref 6–20)
BUN/CREAT SERPL: 26.5 (ref 7–25)
CALCIUM SPEC-SCNC: 8.7 MG/DL (ref 8.6–10.5)
CHLORIDE SERPL-SCNC: 101 MMOL/L (ref 98–107)
CO2 SERPL-SCNC: 20 MMOL/L (ref 22–29)
CREAT SERPL-MCNC: 0.68 MG/DL (ref 0.57–1)
DEPRECATED RDW RBC AUTO: 49.4 FL (ref 37–54)
ERYTHROCYTE [DISTWIDTH] IN BLOOD BY AUTOMATED COUNT: 15.7 % (ref 12.3–15.4)
GFR SERPL CREATININE-BSD FRML MDRD: 89 ML/MIN/1.73
GLOBULIN UR ELPH-MCNC: 3.6 GM/DL
GLUCOSE BLDC GLUCOMTR-MCNC: 294 MG/DL (ref 70–105)
GLUCOSE SERPL-MCNC: 284 MG/DL (ref 65–99)
HCT VFR BLD AUTO: 35.1 % (ref 34–46.6)
HGB BLD-MCNC: 11.9 G/DL (ref 12–15.9)
HOLD SPECIMEN: NORMAL
LYMPHOCYTES # BLD MANUAL: 0.21 10*3/MM3 (ref 0.7–3.1)
LYMPHOCYTES NFR BLD MANUAL: 22 % (ref 19.6–45.3)
LYMPHOCYTES NFR BLD MANUAL: 8 % (ref 5–12)
MAGNESIUM SERPL-MCNC: 2 MG/DL (ref 1.6–2.6)
MCH RBC QN AUTO: 30.7 PG (ref 26.6–33)
MCHC RBC AUTO-ENTMCNC: 33.9 G/DL (ref 31.5–35.7)
MCV RBC AUTO: 90.8 FL (ref 79–97)
MONOCYTES # BLD AUTO: 0.07 10*3/MM3 (ref 0.1–0.9)
NEUTROPHILS # BLD AUTO: 0.62 10*3/MM3 (ref 1.7–7)
NEUTROPHILS NFR BLD MANUAL: 66 % (ref 42.7–76)
NEUTS BAND NFR BLD MANUAL: 3 % (ref 0–5)
PLATELET # BLD AUTO: 59 10*3/MM3 (ref 140–450)
PMV BLD AUTO: 7.9 FL (ref 6–12)
POTASSIUM SERPL-SCNC: 4.2 MMOL/L (ref 3.5–5.2)
PROT SERPL-MCNC: 7.6 G/DL (ref 6–8.5)
RBC # BLD AUTO: 3.87 10*6/MM3 (ref 3.77–5.28)
SCAN SLIDE: NORMAL
SMALL PLATELETS BLD QL SMEAR: ABNORMAL
SODIUM SERPL-SCNC: 133 MMOL/L (ref 136–145)
VARIANT LYMPHS NFR BLD MANUAL: 1 % (ref 0–5)
WBC # BLD AUTO: 0.9 10*3/MM3 (ref 3.4–10.8)
WBC MORPH BLD: NORMAL

## 2021-09-02 PROCEDURE — G0378 HOSPITAL OBSERVATION PER HR: HCPCS

## 2021-09-02 PROCEDURE — 63710000001 INSULIN ISOPHANE & REGULAR PER 5 UNITS: Performed by: NURSE PRACTITIONER

## 2021-09-02 PROCEDURE — 83520 IMMUNOASSAY QUANT NOS NONAB: CPT | Performed by: NURSE PRACTITIONER

## 2021-09-02 PROCEDURE — 82248 BILIRUBIN DIRECT: CPT | Performed by: NURSE PRACTITIONER

## 2021-09-02 PROCEDURE — 85007 BL SMEAR W/DIFF WBC COUNT: CPT | Performed by: NURSE PRACTITIONER

## 2021-09-02 PROCEDURE — 85025 COMPLETE CBC W/AUTO DIFF WBC: CPT | Performed by: NURSE PRACTITIONER

## 2021-09-02 PROCEDURE — 80053 COMPREHEN METABOLIC PANEL: CPT | Performed by: NURSE PRACTITIONER

## 2021-09-02 PROCEDURE — 83735 ASSAY OF MAGNESIUM: CPT | Performed by: NURSE PRACTITIONER

## 2021-09-02 PROCEDURE — 82962 GLUCOSE BLOOD TEST: CPT

## 2021-09-02 PROCEDURE — 63710000001 INSULIN LISPRO (HUMAN) PER 5 UNITS: Performed by: NURSE PRACTITIONER

## 2021-09-02 PROCEDURE — 99217 PR OBSERVATION CARE DISCHARGE MANAGEMENT: CPT | Performed by: INTERNAL MEDICINE

## 2021-09-02 PROCEDURE — 36415 COLL VENOUS BLD VENIPUNCTURE: CPT | Performed by: NURSE PRACTITIONER

## 2021-09-02 RX ORDER — LEVOFLOXACIN 500 MG/1
500 TABLET, FILM COATED ORAL DAILY
Qty: 7 TABLET | Refills: 0 | Status: SHIPPED | OUTPATIENT
Start: 2021-09-02 | End: 2021-09-09

## 2021-09-02 RX ADMIN — LISINOPRIL 20 MG: 20 TABLET ORAL at 08:47

## 2021-09-02 RX ADMIN — ROSUVASTATIN CALCIUM 20 MG: 10 TABLET, FILM COATED ORAL at 01:40

## 2021-09-02 RX ADMIN — Medication 10 ML: at 01:41

## 2021-09-02 RX ADMIN — ASPIRIN 81 MG CHEWABLE TABLET 81 MG: 81 TABLET CHEWABLE at 08:47

## 2021-09-02 RX ADMIN — INSULIN HUMAN 40 UNITS: 100 INJECTION, SUSPENSION SUBCUTANEOUS at 07:47

## 2021-09-02 RX ADMIN — INSULIN LISPRO 8 UNITS: 100 INJECTION, SOLUTION INTRAVENOUS; SUBCUTANEOUS at 07:47

## 2021-09-02 RX ADMIN — Medication 10 ML: at 08:47

## 2021-09-02 NOTE — CASE MANAGEMENT/SOCIAL WORK
Case Management Discharge Note      Final Note: Routine to home    Provided Post Acute Provider List?: N/A  N/A Provider List Comment: denies dc needs  Provided Post Acute Provider Quality & Resource List?: N/A      Final Discharge Disposition Code: 01 - home or self-care

## 2021-09-02 NOTE — PLAN OF CARE
Problem: Adult Inpatient Plan of Care  Goal: Plan of Care Review  Outcome: Ongoing, Progressing  Flowsheets (Taken 9/2/2021 1241)  Progress: improving  Plan of Care Reviewed With: patient  Outcome Summary: Patient rested through the evening, remained on room air.   Goal Outcome Evaluation:  Plan of Care Reviewed With: patient        Progress: improving  Outcome Summary: Patient rested through the evening, remained on room air.

## 2021-09-02 NOTE — CASE MANAGEMENT/SOCIAL WORK
Discharge Planning Assessment  [facility]     Patient Name: Gladys Garrison  MRN: [id number]  Today's Date: 9/2/2021    Admit Date: 9/1/2021    Discharge Needs Assessment     Row Name 09/02/21 0945       Living Environment    Lives With  child(jessica), dependent;other relative(s);spouse    Name(s) of Who Lives With Patient  Spouse lavon. Reports her brother in law and son also live with her    Current Living Arrangements  home/apartment/condo    Primary Care Provided by  self    Provides Primary Care For  no one    Family Caregiver if Needed  spouse    Family Caregiver Names  Lavon    Quality of Family Relationships  supportive    Able to Return to Prior Arrangements  yes       Resource/Environmental Concerns    Resource/Environmental Concerns  none       Transition Planning    Patient/Family Anticipates Transition to  home    Patient/Family Anticipated Services at Transition  none    Transportation Anticipated  family or friend will provide Reports spouse will pick her up at dc       Discharge Needs Assessment    Equipment Currently Used at Home  glucometer    Concerns to be Addressed  denies needs/concerns at this time;no discharge needs identified    Equipment Needed After Discharge  none    Provided Post Acute Provider List?  N/A    N/A Provider List Comment  denies dc needs    Provided Post Acute Provider Quality & Resource List?  N/A    Patient's Choice of Community Agency(s)  denies dc needs    Current Discharge Risk  chronically ill        Discharge Plan     Row Name 09/02/21 0951       Plan    Plan  Home    Plan Comments  Pt denies dc needs. Confirms her PCP is Dr. Rodriguez and denies problems obtaining medications at [facility].        Continued Care and Services - Admitted Since 9/1/2021    Coordination has not been started for this encounter.       Expected Discharge Date and Time     Expected Discharge Date Expected Discharge Time    Sep 2, 2021         Demographic Summary     Row Name 09/02/21 0944        General Information    Admission Type  observation    Arrived From  physician office    Required Notices Provided  Observation Status Notice verified    Referral Source  admission list;high risk screening    Reason for Consult  discharge planning    Preferred Language  English     Used During This Interaction  no    General Information Comments  Phone communication or documentation only - no physical contact with patient or family.       Contact Information    Contact Information Obtained for          Functional Status     Row Name 09/02/21 0945       Functional Status    Usual Activity Tolerance  good    Current Activity Tolerance  good       Functional Status, IADL    IADL Comments  Pt denies dc needs. Reports she is independent with ADLs            Patient Forms    No documentation.       Zoila Worthington RN, [facility]  Office: [phone]  Fax: [phone]  [email or web address]        Zoila Worthington RN

## 2021-09-02 NOTE — DISCHARGE SUMMARY
[facility] Medicine Services  DISCHARGE SUMMARY    Patient Name: Gladys Garrison  : 1960  MRN: [id number]    Date of Admission: 2021  Date of Discharge: 2021  Primary Care Physician: Angelica Rodriguez MD      Presenting Problem:   COVID-19 [U07.1]    Active and Resolved Hospital Problems:  Active Hospital Problems    Diagnosis POA   • **COVID-19 [U07.1] Yes   • Elevated AST (SGOT) [R74.01] Yes   • Hyponatremia [E87.1] Yes   • Thrombocytopenia (CMS/HCC) [D69.6] Yes   • Systolic congestive heart failure (CMS/HCC) [I50.20] Yes   • Bone metastasis (CMS/HCC) [C79.51] Yes   • Malignant neoplasm of female breast (CMS/HCC) [C50.919] Yes   • Controlled type 2 diabetes mellitus without complication, with long-term current use of insulin (CMS/HCC) [E11.9, Z79.4] Not Applicable   • Hyperlipidemia [E78.5] Yes   • Osteoarthritis of multiple joints [M15.9] Yes   • Hypertensive disorder [I10] Yes      Resolved Hospital Problems   No resolved problems to display.         Hospital Course     Hospital Course:  Gladys Garrison is a 61 y.o. female with past medical history of CABG, breast cancer with mets, diabetes type 2 who presented to [facility] on 2021 complaining of shortness of air, productive cough with yellow/green sputum.  Patient stated symptoms started about 1 week ago.  She complained of fatigue, weakness, nausea, chills, midsternal chest pressure.  Patient denies vomiting, fever, body aches.  Patient had received Covid vaccine. Dr. Nunez manages patient's breast cancer with mets. In the ED, CT for PE protocol showed negative for pulmonary embolus; some new mild lung opacities which could be due to pneumonia; cardiomegaly and enlargement of the pulmonary arteries.  Chest x-ray showed hazy bilateral airspace opacities, which could reflect pneumonia or pulmonary edema; cardiomegaly.  Positive for COVID-19.  Patient remains on room air and afebrile.  Case  discussed with oncology, pancytopenia likely secondary to home chemo drug Palbociclib and patient told to hold until she follows up with oncology again. Will give 7-day course of Levaquin to finish up at home per oncology.  Patient wants to go home today.  Patient stable to discharge home today and follow-up with PCP and oncology as an outpatient.        DISCHARGE Follow Up Recommendations for labs and diagnostics: Follow-up with PCP and oncology.  Repeat CBC within 1 week.      Reasons For Change In Medications and Indications for New Medications:  Stopping palbociclib.  Levaquin added.    Day of Discharge     Vital Signs:  Temp:  [99.1 °F (37.3 °C)-100.5 °F (38.1 °C)] 99.8 °F (37.7 °C)  Heart Rate:  [70-98] 70  Resp:  [18-22] 18  BP: (105-161)/() 116/80  Flow (L/min):  [2] 2    Physical Exam:  General: Awake, alert, NAD  Eyes: PERRL, EOMI, conjunctive are clear  Cardiovascular: Regular rate and rhythm, no murmurs  Respiratory: Clear to auscultation bilaterally, no wheezing or rales, unlabored breathing  Abdomen: Soft, nontender, positive bowel sounds, no guarding  Neurologic: A&O, CN grossly intact, moves all extremities spontaneously  Musculoskeletal: Normal range of motion, no deformities  Skin: Warm, dry, intact        Pertinent  and/or Most Recent Results     LAB RESULTS:      Lab 09/02/21  0430 09/01/21  1559 09/01/21  1515   WBC 0.90*  --  1.60*   HEMOGLOBIN 11.9*  --  13.1   HEMATOCRIT 35.1  --  38.8   PLATELETS 59*  --  96*   NEUTROS ABS 0.62*  --  1.00*   LYMPHS ABS  --   --  0.40*   MONOS ABS  --   --  0.20   EOS ABS  --   --  0.00   MCV 90.8  --  90.3   CRP  --   --  1.10*   PROCALCITONIN  --   --  0.05   LACTATE  --  1.2  --          Lab 09/02/21  0709 09/01/21  1515   SODIUM 133* 132*   POTASSIUM 4.2 4.0   CHLORIDE 101 97*   CO2 20.0* 21.0*   ANION GAP 12.0 14.0   BUN 18 14   CREATININE 0.68 0.69   GLUCOSE 284* 112*   CALCIUM 8.7 8.6   MAGNESIUM 2.0 1.7         Lab 09/02/21  0709 09/01/21  1511    TOTAL PROTEIN 7.6 7.9   ALBUMIN 4.00 4.20   GLOBULIN 3.6 3.7   ALT (SGPT) 25 28   AST (SGOT) 34* 46*   BILIRUBIN 0.5 0.5   BILIRUBIN DIRECT 0.2  --    ALK PHOS 59 58         Lab 09/01/21  1515   PROBNP 148.0   TROPONIN T <0.010         Lab 09/01/21  1515   CHOLESTEROL 120   LDL CHOL 78   HDL CHOL 27*   TRIGLYCERIDES 75         Lab 09/01/21  1515   FERRITIN 435.70*         Brief Urine Lab Results  (Last result in the past 365 days)      Color   Clarity   Blood   Leuk Est   Nitrite   Protein   CREAT   Urine HCG        06/14/21 1141             95.2           Microbiology Results (last 10 days)     Procedure Component Value - Date/Time    COVID-19,CEPHEID/CHANDRA/BDMAX,COR/[facility] IN-HOUSE(OR EMERGENT/ADD-ON),NP SWAB IN TRANSPORT MEDIA 3-4 HR TAT, RT-PCR - Swab, Nasopharynx [[id number]]  (Abnormal) Collected: 09/01/21 1537    Lab Status: Final result Specimen: Swab from Nasopharynx Updated: 09/01/21 1629     COVID19 Detected    Narrative:      Fact sheet for providers: https://www.fda.gov/media/074937/download     Fact sheet for patients: https://www.fda.gov/media/230265/download  Fact sheet for providers: https://www.fda.gov/media/073516/download    Fact sheet for patients: https://www.fda.gov/media/638050/download    Test performed by PCR.    Respiratory Panel, PCR (WITHOUT COVID) - Swab, Nasopharynx [[id number]]  (Normal) Collected: 09/01/21 1537    Lab Status: Final result Specimen: Swab from Nasopharynx Updated: 09/01/21 2111     ADENOVIRUS, PCR Not Detected     Coronavirus 229E Not Detected     Coronavirus HKU1 Not Detected     Coronavirus NL63 Not Detected     Coronavirus OC43 Not Detected     Human Metapneumovirus Not Detected     Human Rhinovirus/Enterovirus Not Detected     Influenza B PCR Not Detected     Parainfluenza Virus 1 Not Detected     Parainfluenza Virus 2 Not Detected     Parainfluenza Virus 3 Not Detected     Parainfluenza Virus 4 Not Detected     Bordetella pertussis pcr Not Detected      Chlamydophila pneumoniae PCR Not Detected     Mycoplasma pneumo by PCR Not Detected     Influenza A PCR Not Detected     RSV, PCR Not Detected     Bordetella parapertussis PCR Not Detected    Narrative:      The coronavirus on the RVP is NOT COVID-19 and is NOT indicative of infection with COVID-19.    In the setting of a positive respiratory panel with a viral infection PLUS a negative procalcitonin without other underlying concern for bacterial infection, consider observing off antibiotics or discontinuation of antibiotics and continue supportive care. If the respiratory panel is positive for atypical bacterial infection (Bordetella pertussis, Chlamydophila pneumoniae, or Mycoplasma pneumoniae), consider antibiotic de-escalation to target atypical bacterial infection.          XR Chest 1 View    Result Date: 9/1/2021  Impression: 1.Hazy bilateral airspace opacities, which could reflect pneumonia or pulmonary edema. 2.Cardiomegaly.  Electronically Signed By-Brian Buckner MD On:9/1/2021 3:39 PM This report was finalized on [id number] by  Brian Buckner MD.    CT Chest Pulmonary Embolism    Result Date: 9/1/2021  Impression: 1. Negative for pulmonary embolus. 2. There are some new mild lung opacities which could be due to pneumonia. There are some stable lung abnormalities consistent with scarring. There has been interval resolution of some other lung opacities. 4. There is again noted to be cardiomegaly and enlargement of the pulmonary arteries. 4. Additional findings as reported above.  Electronically Signed By-Raquel Zhao MD On:9/1/2021 6:45 PM This report was finalized on [id number] by  Raquel Zhao MD.              Results for orders placed during the hospital encounter of 02/04/21    Transthoracic Echo Complete With Contrast if Necessary Per Protocol    Interpretation Summary  Technically difficult study due to poor acoustic windows.  Normal LV size with mild septal dyskinesis, EF of probably  40-45 %  Moderate right ventricle are enlargement with hypokinetic right ventricle.  Pacemaker lead seen in right ventricle.  Normal atrial size  Aortic valve, mitral valve, tricuspid valve appears structurally normal, no significant regurgitation seen.  No pericardial effusion seen.  Proximal aorta appears normal in size.      Labs Pending at Discharge:  Pending Labs     Order Current Status    Blood Culture - Blood, Arm, Right In process    Blood Culture - Blood, Arm, Right In process    Interleukin-6 In process          Procedures Performed           Consults:   Consults     Date and Time Order Name Status Description    9/1/2021  7:07 PM Hospitalist (on-call MD unless specified) Completed     9/1/2021  6:56 PM Hospitalist (on-call MD unless specified) Completed             Discharge Details        Discharge Medications      New Medications      Instructions Start Date   levoFLOXacin 500 MG tablet  Commonly known as: Levaquin   500 mg, Oral, Daily         Continue These Medications      Instructions Start Date   anastrozole 1 MG tablet  Commonly known as: ARIMIDEX   1 mg, Oral, Daily      aspirin 81 MG chewable tablet   81 mg, Oral, Daily      Calcium 600+D Plus Minerals 600-400 MG-UNIT chewable tablet   600 mg, Oral, 2 Times Daily      celecoxib 200 MG capsule  Commonly known as: CeleBREX   200 mg, Oral, Daily      cyclobenzaprine 10 MG tablet  Commonly known as: FLEXERIL   10 mg, Oral, 2 Times Daily PRN      insulin NPH-insulin regular (70-30) 100 UNIT/ML injection  Commonly known as: humuLIN 70/30,novoLIN 70/30   40 Units, Subcutaneous, Every Morning      insulin NPH-insulin regular (70-30) 100 UNIT/ML injection  Commonly known as: humuLIN 70/30,novoLIN 70/30   30 Units, Subcutaneous, Every Evening      lisinopril 20 MG tablet  Commonly known as: PRINIVIL,ZESTRIL   20 mg, Oral, Daily      ondansetron 8 MG tablet  Commonly known as: ZOFRAN   8 mg, Oral, 3 Times Daily PRN      rosuvastatin 20 MG tablet  Commonly  known as: Crestor   20 mg, Oral, Nightly         Stop These Medications    Palbociclib 125 MG tablet            Allergies   Allergen Reactions   • Promethazine Mental Status Change   • Tape Other (See Comments)     .blisters           Discharge Disposition:  Home or Self Care    Diet:  Hospital:  Diet Order   Procedures   • Diet Diabetic/Consistent Carbs, Cardiac; Healthy Heart; Diabetic - Consistent Carb         Discharge Activity:         CODE STATUS:  Code Status and Medical Interventions:   Ordered at: 09/01/21 2115     Level Of Support Discussed With:    Patient     Code Status:    CPR     Medical Interventions (Level of Support Prior to Arrest):    Full         Future Appointments   Date Time Provider Department Center   9/20/2021 10:35 AM LAB  [facility]   9/20/2021 10:45 AM Мария Nunez MD [facility]   12/28/2021  2:45 PM Lopez Rey MD [facility]           Time spent on Discharge including face to face service:  25 minutes    Signature:    Electronically signed by Xenia Martel DO, 09/02/21, 9:46 AM EDT.

## 2021-09-02 NOTE — OUTREACH NOTE
Prep Survey      Responses   [facility] patient [facility]?  [facility]   Is LACE score < 7 ?  No   Emergency Room discharge w/ pulse ox?  No   Eligibility  TCM   Hospital  [facility]   Date of Admission  09/01/21   Date of Discharge  09/02/21   Discharge Disposition  Home or Self Care   Discharge diagnosis  Covid 19   Does the patient have one of the following disease processes/diagnoses(primary or secondary)?  COVID-19   Does the patient have Home health ordered?  No   Is there a DME ordered?  No   Prep survey completed?  Yes          Belinda Chowdhury RN

## 2021-09-03 ENCOUNTER — TRANSITIONAL CARE MANAGEMENT TELEPHONE ENCOUNTER (OUTPATIENT)
Dept: CALL CENTER | Facility: HOSPITAL | Age: 61
End: 2021-09-03

## 2021-09-03 LAB
IL6 SERPL-MCNC: 3.6 PG/ML (ref 0–13)
QT INTERVAL: 453 MS

## 2021-09-03 NOTE — OUTREACH NOTE
Call Center TCM Note      Responses   [facility] patient discharged from?  [facility]   Does the patient have one of the following disease processes/diagnoses(primary or secondary)?  COVID-19   COVID-19 underlying condition?  None   TCM attempt successful?  Yes [Viry on verbal release ]   Call start time  0828   Call end time  0831   Discharge diagnosis  Covid 19   Meds reviewed with patient/caregiver?  Yes   Is the patient having any side effects they believe may be caused by any medication additions or changes?  No   Does the patient have all medications ordered at discharge?  Yes   Is the patient taking all medications as directed (includes completed medication regime)?  Yes   Does the patient have a primary care provider?   Yes   Comments regarding PCP  Hosp dc [facility] on 9/13/21 with PCP    Does the patient have an appointment with their PCP or specialist within 7 days of discharge?  Greater than 7 days   What is preventing the patient from scheduling follow up appointments within 7 days of discharge?  Earlier appointment not available   Nursing Interventions  Verified appointment date/time/provider   Psychosocial issues?  No   Did the patient receive a copy of their discharge instructions?  Yes   Did the patient receive a copy of COVID-19 specific instructions?  Yes   Nursing interventions  Reviewed instructions with patient   What is the patient's perception of their health status since discharge?  Same   Does the patient have any of the following symptoms?  Shortness of breath, Fever/chills [SOB with exertion ]   Nursing Interventions  Nurse provided patient education   Pulse Ox monitoring  None [pt plans on purchasing a pulse ox ]   Is the patient/caregiver able to teach back steps to recovery at home?  Set small, achievable goals for return to baseline health, Rest and rebuild strength, gradually increase activity   If the patient is a current smoker, are they able to teach back resources for  cessation?  Not a smoker   Is the patient/caregiver able to teach back the hierarchy of who to call/visit for symptoms/problems? PCP, Specialist, Home health nurse, Urgent Care, ED, 911  Yes   TCM call completed?  Yes          Kaia Winkler RN    9/3/2021, 08:38 EDT

## 2021-09-04 ENCOUNTER — READMISSION MANAGEMENT (OUTPATIENT)
Dept: CALL CENTER | Facility: HOSPITAL | Age: 61
End: 2021-09-04

## 2021-09-04 NOTE — OUTREACH NOTE
COVID-19 Week 1 Survey      Responses   [facility] patient discharged from?  [facility]   Does the patient have one of the following disease processes/diagnoses(primary or secondary)?  COVID-19   COVID-19 underlying condition?  None   Call Number  Call 2   Week 1 Call successful?  No   Discharge diagnosis  Covid 19          Floresita Holbrook RN

## 2021-09-05 ENCOUNTER — READMISSION MANAGEMENT (OUTPATIENT)
Dept: CALL CENTER | Facility: HOSPITAL | Age: 61
End: 2021-09-05

## 2021-09-05 NOTE — OUTREACH NOTE
COVID-19 Week 1 Survey      Responses   [facility] [place] patient discharged from?  [facility]   Does the patient have one of the following disease processes/diagnoses(primary or secondary)?  COVID-19   COVID-19 underlying condition?  None   Call Number  Call 3   Week 1 Call successful?  Yes   Call start time  1234   Call end time  1241   Discharge diagnosis  Covid 19   Is the patient taking all medications as directed (includes completed medication regime)?  Yes   Medication comments  7 days of antibx   Has the patient kept scheduled appointments due by today?  N/A   Has home health visited the patient within 72 hours of discharge?  N/A   Psychosocial issues?  No   What is the patient's perception of their health status since discharge?  Same   Does the patient have any of the following symptoms?  Loss of taste/smell, Cough   Pulse Ox monitoring  Intermittent   Pulse Ox device source  Patient   O2 Sat comments  94-95% on RA   O2 Sat: education provided  Sat levels, When to seek care   O2 Sat education comments  Keep sats above 92@, come to ED if trending down   Is the patient/caregiver able to teach back the hierarchy of who to call/visit for symptoms/problems? PCP, Specialist, Home health nurse, Urgent Care, ED, 911  Yes   COVID-19 call completed?  Yes   Wrap up additional comments  Pt sattes she has still not been feeling well. Enc rest, fluids and return to see PCP or come to ED if worsening.           Nancy Marsh RN

## 2021-09-06 LAB
BACTERIA SPEC AEROBE CULT: NORMAL
BACTERIA SPEC AEROBE CULT: NORMAL

## 2021-09-08 ENCOUNTER — READMISSION MANAGEMENT (OUTPATIENT)
Dept: CALL CENTER | Facility: HOSPITAL | Age: 61
End: 2021-09-08

## 2021-09-08 NOTE — OUTREACH NOTE
COVID-19 Week 1 Survey      Responses   [facility] patient discharged from?  [facility]   Does the patient have one of the following disease processes/diagnoses(primary or secondary)?  COVID-19   COVID-19 underlying condition?  None   Call Number  Call 4   Week 1 Call successful?  Yes   Call start time  1458   Call end time  1508   Discharge diagnosis  Covid 19   Is the patient taking all medications as directed (includes completed medication regime)?  Yes   Has the patient kept scheduled appointments due by today?  N/A   Psychosocial issues?  No   What is the patient's perception of their health status since discharge?  Same   Does the patient have any of the following symptoms?  Loss of taste/smell, Cough   Nursing Interventions  Nurse provided patient education   Pulse Ox monitoring  Intermittent   Pulse Ox device source  Patient   O2 Sat comments  93%-97% on RA   O2 Sat: education provided  Sat levels, When to seek care   O2 Sat education comments  Keep sats above 92%, come to ED if trending down   Is the patient/caregiver able to teach back steps to recovery at home?  Set small, achievable goals for return to baseline health, Rest and rebuild strength, gradually increase activity   Is the patient/caregiver able to teach back the hierarchy of who to call/visit for symptoms/problems? PCP, Specialist, Home health nurse, Urgent Care, ED, 911  Yes   COVID-19 call completed?  Yes   Wrap up additional comments  Broke 4 ribs on right side before Covid dx. She and her  are still struggling with SOA, cough and fatigue.          Nancy Marsh RN

## 2021-09-10 ENCOUNTER — MEDICATION THERAPY MANAGEMENT (OUTPATIENT)
Dept: PHARMACY | Facility: HOSPITAL | Age: 61
End: 2021-09-10

## 2021-09-10 NOTE — PROGRESS NOTES
MTM Note: Ibrance    Patient recently diagnosed with Covid and treated with remdesivir.  She started her last cycle of Ibrance on 8/20/21.  Called patient to check on symptoms and she reports that she is feeling better today.  She has not had a fever for around 48 hours.  She still has a productive cough and finished a 7 day course of levofloxacin on 9/9/21.  She also reports that she did start her Ibrance on 8/20/21 but only took medication for 7 days because she was going on vacation and didn't want to weaken her immune system and then she got sick so she did not take the rest. Labs from hospital show:          9/2/2021   WBC 3.40 - 10.80 10*3/mm3 0.90 (A)   Neutrophils Absolute 1.70 - 7.00 10*3/mm3 0.62 (A)   Hemoglobin 12.0 - 15.9 g/dL 11.9 (A)   Hematocrit 34.0 - 46.6 % 35.1   Platelets 140 - 450 10*3/mm3 59 (A)   Creatinine 0.57 - 1.00 mg/dL 0.68   eGFR Non African Am >60 mL/min/1.73 89   BUN 6 - 20 mg/dL 18   Sodium 136 - 145 mmol/L 133 (A)   Potassium 3.5 - 5.2 mmol/L 4.2   Glucose 65 - 99 mg/dL 284 (A)   Magnesium 1.6 - 2.6 mg/dL 2.0   Calcium 8.6 - 10.5 mg/dL 8.7   Albumin 3.50 - 5.20 g/dL 4.00   Total Protein 6.0 - 8.5 g/dL 7.6   AST (SGOT) 1 - 32 U/L 34 (A)   ALT (SGPT) 1 - 33 U/L 25   Alkaline Phosphatase 39 - 117 U/L 59   Total Bilirubin 0.0 - 1.2 mg/dL 0.5   Bilirubin, Direct 0.0 - 0.3 mg/dL 0.2     Per Dr. Nunez, patient to hold Ibrance until recovered and labs next Friday.  Patient scheduled for labs on 9/17/21 to evaluate starting next cycle of Ibrance.  Patient expressed understanding.  Thanks,    Eda Her, PharmD

## 2021-09-13 ENCOUNTER — READMISSION MANAGEMENT (OUTPATIENT)
Dept: CALL CENTER | Facility: HOSPITAL | Age: 61
End: 2021-09-13

## 2021-09-13 ENCOUNTER — OFFICE VISIT (OUTPATIENT)
Dept: FAMILY MEDICINE CLINIC | Facility: CLINIC | Age: 61
End: 2021-09-13

## 2021-09-13 ENCOUNTER — TELEPHONE (OUTPATIENT)
Dept: ONCOLOGY | Facility: CLINIC | Age: 61
End: 2021-09-13

## 2021-09-13 VITALS
BODY MASS INDEX: 28.7 KG/M2 | RESPIRATION RATE: 18 BRPM | TEMPERATURE: 96.9 F | OXYGEN SATURATION: 94 % | WEIGHT: 152 LBS | HEART RATE: 87 BPM | DIASTOLIC BLOOD PRESSURE: 88 MMHG | HEIGHT: 61 IN | SYSTOLIC BLOOD PRESSURE: 130 MMHG

## 2021-09-13 DIAGNOSIS — C79.51 BONE METASTASIS: ICD-10-CM

## 2021-09-13 DIAGNOSIS — U07.1 COVID-19: Primary | ICD-10-CM

## 2021-09-13 PROCEDURE — 1111F DSCHRG MED/CURRENT MED MERGE: CPT | Performed by: HOSPITALIST

## 2021-09-13 PROCEDURE — 99496 TRANSJ CARE MGMT HIGH F2F 7D: CPT | Performed by: HOSPITALIST

## 2021-09-13 RX ORDER — OXYCODONE HYDROCHLORIDE AND ACETAMINOPHEN 5; 325 MG/1; MG/1
TABLET ORAL
COMMUNITY
Start: 2021-09-01 | End: 2021-11-11

## 2021-09-13 NOTE — PROGRESS NOTES
Transitional Care Follow Up Visit  Subjective     Gladys Garrison is a 61 y.o. female who presents for a transitional care management visit.    Within 48 business hours after discharge our office contacted her via telephone to coordinate her care and needs.      I reviewed and discussed the details of that call along with the discharge summary, hospital problems, inpatient lab results, inpatient diagnostic studies, and consultation reports with Gladys.     Current outpatient and discharge medications have been reconciled for the patient.  Reviewed by: Angelica Rodriguez MD      Date of TCM Phone Call 9/2/2021   Hospital [facility]   Date of Admission 9/1/2021   Date of Discharge 9/2/2021   Discharge Disposition Home or Self Care     Risk for Readmission (LACE) Score: 11 (9/2/2021  6:01 AM)      History of Present Illness   Course During Hospital Stay: Patient feels lot better now with breathing. She said she has appointment with Dr. Nunez this week. No chest pain.      The following portions of the patient's history were reviewed and updated as appropriate: allergies, current medications, past family history, past medical history, past social history, past surgical history and problem list.    Review of Systems    Objective   Physical Exam  Vitals and nursing note reviewed.   Constitutional:       General: She is not in acute distress.     Appearance: Normal appearance. She is well-developed. She is not ill-appearing, toxic-appearing or diaphoretic.   HENT:      Head: Normocephalic and atraumatic.      Right Ear: Ear canal and external ear normal.      Left Ear: Ear canal and external ear normal.      Nose: Nose normal. No congestion or rhinorrhea.      Mouth/Throat:      Mouth: Mucous membranes are moist.      Pharynx: No oropharyngeal exudate.   Eyes:      General: No scleral icterus.        Right eye: No discharge.         Left eye: No discharge.      Extraocular Movements: Extraocular movements intact.       Conjunctiva/sclera: Conjunctivae normal.      Pupils: Pupils are equal, round, and reactive to light.   Neck:      Thyroid: No thyromegaly.      Vascular: No carotid bruit or JVD.      Trachea: No tracheal deviation.   Cardiovascular:      Rate and Rhythm: Normal rate and regular rhythm.      Pulses: Normal pulses.      Heart sounds: Normal heart sounds. No murmur heard.   No friction rub. No gallop.    Pulmonary:      Effort: Pulmonary effort is normal. No respiratory distress.      Breath sounds: Normal breath sounds. No stridor. No wheezing, rhonchi or rales.   Chest:      Chest wall: No tenderness.   Abdominal:      General: Bowel sounds are normal. There is no distension.      Palpations: Abdomen is soft. There is no mass.      Tenderness: There is no abdominal tenderness. There is no guarding or rebound.      Hernia: No hernia is present.   Musculoskeletal:         General: No swelling, tenderness, deformity or signs of injury. Normal range of motion.      Cervical back: Normal range of motion and neck supple. No rigidity. No muscular tenderness.      Right lower leg: No edema.      Left lower leg: No edema.   Lymphadenopathy:      Cervical: No cervical adenopathy.   Skin:     General: Skin is warm and dry.      Coloration: Skin is not jaundiced or pale.      Findings: No bruising, erythema or rash.   Neurological:      General: No focal deficit present.      Mental Status: She is alert and oriented to person, place, and time. Mental status is at baseline.      Cranial Nerves: No cranial nerve deficit.      Sensory: No sensory deficit.      Motor: No weakness or abnormal muscle tone.      Coordination: Coordination normal.   Psychiatric:         Mood and Affect: Mood normal.         Behavior: Behavior normal.         Thought Content: Thought content normal.         Judgment: Judgment normal.         Assessment/Plan   Diagnoses and all orders for this visit:    1. COVID-19 (Primary)  Comments:  COVID 19  symptoms have resolved now. pt off the isolation. advised to follow in four week for annual physical exam.     2. Bone metastasis (CMS/HCC)  Comments:  Stage 4 ca breast, will be seeing Dr. Nunez this week, Pt may go on Chemotherapy this week.

## 2021-09-13 NOTE — TELEPHONE ENCOUNTER
Caller: LUCIEN    Relationship to patient: SELF    Best call back number: [phone]    Patient is needing: TO REQUEST LATER TIME ON Friday, 9- AFTER 2 PM.

## 2021-09-13 NOTE — OUTREACH NOTE
COVID-19 Week 2 Survey      Responses   [facility] patient discharged from?  [facility]   Does the patient have one of the following disease processes/diagnoses(primary or secondary)?  COVID-19   COVID-19 underlying condition?  None   Call Number  Call 1   COVID-19 Week 2: Call 1 attempt successful?  No   Discharge diagnosis  Covid 19          Nancy Marsh RN

## 2021-09-13 NOTE — TELEPHONE ENCOUNTER
PATIENT SCHEDULED FOR LABS ON 9- BUT NEEDS TO COME IN AT A LATER TIME.  TIME CHANGED TO 3:05.  PATIENT V/U.

## 2021-09-16 ENCOUNTER — READMISSION MANAGEMENT (OUTPATIENT)
Dept: CALL CENTER | Facility: HOSPITAL | Age: 61
End: 2021-09-16

## 2021-09-16 NOTE — OUTREACH NOTE
COVID-19 Week 2 Survey      Responses   [facility] patient discharged from?  [facility]   Does the patient have one of the following disease processes/diagnoses(primary or secondary)?  COVID-19   COVID-19 underlying condition?  None   Call Number  Call 2   COVID-19 Week 2: Call 1 attempt successful?  No   Discharge diagnosis  Covid 19          Nancy Marsh RN

## 2021-09-17 ENCOUNTER — LAB (OUTPATIENT)
Dept: LAB | Facility: HOSPITAL | Age: 61
End: 2021-09-17

## 2021-09-17 ENCOUNTER — APPOINTMENT (OUTPATIENT)
Dept: LAB | Facility: HOSPITAL | Age: 61
End: 2021-09-17

## 2021-09-17 ENCOUNTER — MEDICATION THERAPY MANAGEMENT (OUTPATIENT)
Dept: PHARMACY | Facility: HOSPITAL | Age: 61
End: 2021-09-17

## 2021-09-17 DIAGNOSIS — C50.919 MALIGNANT NEOPLASM OF FEMALE BREAST, UNSPECIFIED ESTROGEN RECEPTOR STATUS, UNSPECIFIED LATERALITY, UNSPECIFIED SITE OF BREAST (HCC): ICD-10-CM

## 2021-09-17 DIAGNOSIS — G89.3 CANCER ASSOCIATED PAIN: Primary | ICD-10-CM

## 2021-09-17 LAB
BASOPHILS # BLD AUTO: 0.02 10*3/MM3 (ref 0–0.2)
BASOPHILS NFR BLD AUTO: 0.3 % (ref 0–1.5)
DEPRECATED RDW RBC AUTO: 43.3 FL (ref 37–54)
EOSINOPHIL # BLD AUTO: 0.1 10*3/MM3 (ref 0–0.4)
EOSINOPHIL NFR BLD AUTO: 1.5 % (ref 0.3–6.2)
ERYTHROCYTE [DISTWIDTH] IN BLOOD BY AUTOMATED COUNT: 13.2 % (ref 12.3–15.4)
HCT VFR BLD AUTO: 37.3 % (ref 34–46.6)
HGB BLD-MCNC: 11.9 G/DL (ref 12–15.9)
LYMPHOCYTES # BLD AUTO: 1.28 10*3/MM3 (ref 0.7–3.1)
LYMPHOCYTES NFR BLD AUTO: 19.7 % (ref 19.6–45.3)
MCH RBC QN AUTO: 29.8 PG (ref 26.6–33)
MCHC RBC AUTO-ENTMCNC: 31.9 G/DL (ref 31.5–35.7)
MCV RBC AUTO: 93.5 FL (ref 79–97)
MONOCYTES # BLD AUTO: 0.79 10*3/MM3 (ref 0.1–0.9)
MONOCYTES NFR BLD AUTO: 12.2 % (ref 5–12)
NEUTROPHILS NFR BLD AUTO: 4.31 10*3/MM3 (ref 1.7–7)
NEUTROPHILS NFR BLD AUTO: 66.3 % (ref 42.7–76)
PLATELET # BLD AUTO: 197 10*3/MM3 (ref 140–450)
PMV BLD AUTO: 10.7 FL (ref 6–12)
RBC # BLD AUTO: 3.99 10*6/MM3 (ref 3.77–5.28)
WBC # BLD AUTO: 6.5 10*3/MM3 (ref 3.4–10.8)

## 2021-09-17 PROCEDURE — 85025 COMPLETE CBC W/AUTO DIFF WBC: CPT

## 2021-09-17 PROCEDURE — 36415 COLL VENOUS BLD VENIPUNCTURE: CPT

## 2021-09-17 NOTE — PROGRESS NOTES
[facility] Lab Review: Ibrance        9/17/2021   WBC 3.40 - 10.80 10*3/mm3 6.50   Neutrophils Absolute 1.70 - 7.00 10*3/mm3 4.31   Hemoglobin 12.0 - 15.9 g/dL 11.9 (A)   Hematocrit 34.0 - 46.6 % 37.3   Platelets 140 - 450 10*3/mm3 197     Labs reviewed with Dr. Nunez and patient okay to start next cycle of Ibrance.  Called Gladys to let her know she can start Ibrance.  She will start tomorrow.  Due to neutropenia after last cycle, per Dr. Nunez, recheck CBC in 10 days.  Labs scheduled and patient aware.

## 2021-09-21 ENCOUNTER — TELEPHONE (OUTPATIENT)
Dept: ONCOLOGY | Facility: CLINIC | Age: 61
End: 2021-09-21

## 2021-09-23 ENCOUNTER — READMISSION MANAGEMENT (OUTPATIENT)
Dept: CALL CENTER | Facility: HOSPITAL | Age: 61
End: 2021-09-23

## 2021-09-23 NOTE — OUTREACH NOTE
COVID-19 Week 3 Survey      Responses   [facility] patient discharged from?  [facility]   Does the patient have one of the following disease processes/diagnoses(primary or secondary)?  COVID-19   COVID-19 underlying condition?  None   Call Number  Call 1   COVID-19 Week 3: Call 1 attempt successful?  No   Discharge diagnosis  Covid 19          Nancy Marsh RN

## 2021-09-27 ENCOUNTER — APPOINTMENT (OUTPATIENT)
Dept: LAB | Facility: HOSPITAL | Age: 61
End: 2021-09-27

## 2021-09-27 ENCOUNTER — TELEPHONE (OUTPATIENT)
Dept: ONCOLOGY | Facility: CLINIC | Age: 61
End: 2021-09-27

## 2021-09-27 ENCOUNTER — LAB (OUTPATIENT)
Dept: LAB | Facility: HOSPITAL | Age: 61
End: 2021-09-27

## 2021-09-27 ENCOUNTER — MEDICATION THERAPY MANAGEMENT (OUTPATIENT)
Dept: PHARMACY | Facility: HOSPITAL | Age: 61
End: 2021-09-27

## 2021-09-27 DIAGNOSIS — C79.51 SPINE METASTASIS: ICD-10-CM

## 2021-09-27 DIAGNOSIS — C50.919 MALIGNANT NEOPLASM OF FEMALE BREAST, UNSPECIFIED ESTROGEN RECEPTOR STATUS, UNSPECIFIED LATERALITY, UNSPECIFIED SITE OF BREAST (HCC): ICD-10-CM

## 2021-09-27 LAB
BASOPHILS # BLD AUTO: 0.02 10*3/MM3 (ref 0–0.2)
BASOPHILS NFR BLD AUTO: 0.5 % (ref 0–1.5)
DEPRECATED RDW RBC AUTO: 44 FL (ref 37–54)
EOSINOPHIL # BLD AUTO: 0.14 10*3/MM3 (ref 0–0.4)
EOSINOPHIL NFR BLD AUTO: 3.3 % (ref 0.3–6.2)
ERYTHROCYTE [DISTWIDTH] IN BLOOD BY AUTOMATED COUNT: 13.2 % (ref 12.3–15.4)
HCT VFR BLD AUTO: 39.6 % (ref 34–46.6)
HGB BLD-MCNC: 12.3 G/DL (ref 12–15.9)
LYMPHOCYTES # BLD AUTO: 1.03 10*3/MM3 (ref 0.7–3.1)
LYMPHOCYTES NFR BLD AUTO: 24.4 % (ref 19.6–45.3)
MCH RBC QN AUTO: 29.8 PG (ref 26.6–33)
MCHC RBC AUTO-ENTMCNC: 31.1 G/DL (ref 31.5–35.7)
MCV RBC AUTO: 95.9 FL (ref 79–97)
MONOCYTES # BLD AUTO: 0.18 10*3/MM3 (ref 0.1–0.9)
MONOCYTES NFR BLD AUTO: 4.3 % (ref 5–12)
NEUTROPHILS NFR BLD AUTO: 2.85 10*3/MM3 (ref 1.7–7)
NEUTROPHILS NFR BLD AUTO: 67.5 % (ref 42.7–76)
PLATELET # BLD AUTO: 116 10*3/MM3 (ref 140–450)
PMV BLD AUTO: 9.9 FL (ref 6–12)
RBC # BLD AUTO: 4.13 10*6/MM3 (ref 3.77–5.28)
WBC # BLD AUTO: 4.22 10*3/MM3 (ref 3.4–10.8)

## 2021-09-27 PROCEDURE — 36415 COLL VENOUS BLD VENIPUNCTURE: CPT

## 2021-09-27 PROCEDURE — 85025 COMPLETE CBC W/AUTO DIFF WBC: CPT

## 2021-09-27 NOTE — TELEPHONE ENCOUNTER
CALLED HAS LABS THIS MORNING AT 8:40AM, WANTED TO MOVE TO LATER IN THE DAY IF POSSIBLE.      I CALLED AND SPOKE WITH HERNANDEZ IN SCHED AND SHE MOVED THE APPT TO 1:10PM I ADV TO PT AND SHE V/U.

## 2021-09-27 NOTE — PROGRESS NOTES
[facility] Note: Ibrance        9/27/2021   WBC 3.40 - 10.80 10*3/mm3 4.22   Neutrophils Absolute 1.70 - 7.00 10*3/mm3 2.85   Hemoglobin 12.0 - 15.9 g/dL 12.3   Hematocrit 34.0 - 46.6 % 39.6   Platelets 140 - 450 10*3/mm3 116 (A)

## 2021-09-30 ENCOUNTER — READMISSION MANAGEMENT (OUTPATIENT)
Dept: CALL CENTER | Facility: HOSPITAL | Age: 61
End: 2021-09-30

## 2021-09-30 ENCOUNTER — TELEPHONE (OUTPATIENT)
Dept: ONCOLOGY | Facility: CLINIC | Age: 61
End: 2021-09-30

## 2021-09-30 NOTE — OUTREACH NOTE
COVID-19 Week 4 Survey      Responses   [facility] patient discharged from?  [facility]   Does the patient have one of the following disease processes/diagnoses(primary or secondary)?  COVID-19   COVID-19 underlying condition?  None   Call Number  Call 1   COVID-19 Week 4: Call 1 attempt successful?  Yes   Call start time  1406   Call end time  1411   Discharge diagnosis  Covid 19   Person spoke with today (if not patient) and relationship  patient   Meds reviewed with patient/caregiver?  Yes   Is the patient having any side effects they believe may be caused by any medication additions or changes?  No   Does the patient have all medications ordered at discharge?  Yes   Is the patient taking all medications as directed (includes completed medication regime)?  Yes   Has the patient kept scheduled appointments due by today?  Yes   Is the patient still receiving Home Health Services?  N/A   What is the patient's perception of their health status since discharge?  Improving   Does the patient have any of the following symptoms?  Cough   Nursing Interventions  Nurse provided patient education   Pulse Ox monitoring  Intermittent   Pulse Ox device source  Patient   O2 Sat comments  96% RA   Is the patient/caregiver able to teach back steps to recovery at home?  Rest and rebuild strength, gradually increase activity, Eat a well-balance diet   Is the patient interested in additional calls from an ambulatory ?  NOTE:  applies to high risk patients requiring additional follow-up.  No   Did the patient feel the follow up calls were helpful during their recovery period?  Yes   Was the number of calls appropriate?  Yes   Interested in COVID-19 Plasma Donation?  Uncertain   Does the patient have an Advance Directive or Living Will?  No   Is the patient/caregiver familiar with Advance Care Planning?  No   Would the patient like more information on Advance Care Planning?  No   Wrap up additional comments  Pt states she  is better,  still has sore ribs. No questions/concerns.          Mary Bravo RN

## 2021-09-30 NOTE — TELEPHONE ENCOUNTER
Called to [facility] appt for Lab/Enrique on 10/14, or wk of 10/11. No answer. Phone just rang forever then hung up. Unable to leave a v/m

## 2021-10-05 ENCOUNTER — TELEPHONE (OUTPATIENT)
Dept: ONCOLOGY | Facility: CLINIC | Age: 61
End: 2021-10-05

## 2021-10-11 ENCOUNTER — OFFICE VISIT (OUTPATIENT)
Dept: FAMILY MEDICINE CLINIC | Facility: CLINIC | Age: 61
End: 2021-10-11

## 2021-10-11 VITALS
TEMPERATURE: 96.9 F | HEART RATE: 101 BPM | RESPIRATION RATE: 18 BRPM | SYSTOLIC BLOOD PRESSURE: 170 MMHG | WEIGHT: 150 LBS | BODY MASS INDEX: 28.32 KG/M2 | OXYGEN SATURATION: 95 % | DIASTOLIC BLOOD PRESSURE: 100 MMHG | HEIGHT: 61 IN

## 2021-10-11 DIAGNOSIS — I10 PRIMARY HYPERTENSION: ICD-10-CM

## 2021-10-11 DIAGNOSIS — Z79.4 CONTROLLED TYPE 2 DIABETES MELLITUS WITHOUT COMPLICATION, WITH LONG-TERM CURRENT USE OF INSULIN (HCC): Chronic | ICD-10-CM

## 2021-10-11 DIAGNOSIS — E11.9 CONTROLLED TYPE 2 DIABETES MELLITUS WITHOUT COMPLICATION, WITH LONG-TERM CURRENT USE OF INSULIN (HCC): Chronic | ICD-10-CM

## 2021-10-11 DIAGNOSIS — Z00.00 MEDICARE ANNUAL WELLNESS VISIT, SUBSEQUENT: Primary | ICD-10-CM

## 2021-10-11 PROCEDURE — 96160 PT-FOCUSED HLTH RISK ASSMT: CPT | Performed by: HOSPITALIST

## 2021-10-11 PROCEDURE — 1170F FXNL STATUS ASSESSED: CPT | Performed by: HOSPITALIST

## 2021-10-11 PROCEDURE — 1160F RVW MEDS BY RX/DR IN RCRD: CPT | Performed by: HOSPITALIST

## 2021-10-11 PROCEDURE — G0439 PPPS, SUBSEQ VISIT: HCPCS | Performed by: HOSPITALIST

## 2021-10-11 RX ORDER — AMLODIPINE BESYLATE 5 MG/1
5 TABLET ORAL DAILY
Qty: 30 TABLET | Refills: 1 | Status: SHIPPED | OUTPATIENT
Start: 2021-10-11 | End: 2022-04-05 | Stop reason: HOSPADM

## 2021-10-11 NOTE — PROGRESS NOTES
"Subjective   Gladys Garrison is a 61 y.o. female.     Subjective / HPI  Patient noted running BP on higher side, patient denies for any chest pain, last a1c around 9. Patient advised to check accu check at home with book log and also BP with book log. She will follow in four week for recheck for BP and Dm.   Review of Systems    Objective     /100 (BP Location: Right arm, Cuff Size: Adult)   Pulse 101   Temp 96.9 °F (36.1 °C) (Infrared)   Resp 18   Ht 154.9 cm (61\")   Wt 68 kg (150 lb)   LMP  (LMP Unknown)   SpO2 95%   BMI 28.34 kg/m²      Physical Exam    Procedures       Assessment/Plan   Diagnoses and all orders for this visit:    1. Medicare annual wellness visit, subsequent (Primary)    2. Primary hypertension    3. Controlled type 2 diabetes mellitus without complication, with long-term current use of insulin (HCC)  -     Hemoglobin A1c; Future    Other orders  -     amLODIPine (Norvasc) 5 MG tablet; Take 1 tablet by mouth Daily.  Dispense: 30 tablet; Refill: 1                "

## 2021-10-11 NOTE — PROGRESS NOTES
The ABCs of the Annual Wellness Visit  Subsequent Medicare Wellness Visit    Chief Complaint   Patient presents with   • Medicare Wellness-subsequent      Subjective    History of Present Illness:  Gladys Garrison is a 61 y.o. female who presents for a Subsequent Medicare Wellness Visit.    The following portions of the patient's history were reviewed and   updated as appropriate: allergies, current medications, past family history, past medical history, past social history, past surgical history and problem list.    Compared to one year ago, the patient feels her physical   health is worse.    Compared to one year ago, the patient feels her mental   health is the same.    Recent Hospitalizations:  This patient has had a [facility] admission record on file within the last 365 days.    Current Medical Providers:  Patient Care Team:  Angelica Rodriguez MD as PCP - General (Internal Medicine)  Juliano aHyward MD as Consulting Physician (Cardiology)    Outpatient Medications Prior to Visit   Medication Sig Dispense Refill   • anastrozole (ARIMIDEX) 1 MG tablet Take 1 tablet by mouth Daily. 30 tablet 6   • aspirin 81 MG chewable tablet Chew 1 tablet Daily. 30 tablet 1   • Calcium Carbonate-Vit D-Min (Calcium 600+D Plus Minerals) 600-400 MG-UNIT chewable tablet Chew 600 mg 2 (Two) Times a Day. 60 each 6   • celecoxib (CeleBREX) 200 MG capsule Take 1 capsule by mouth Daily. 30 capsule 1   • cyclobenzaprine (FLEXERIL) 10 MG tablet Take 1 tablet by mouth 2 (Two) Times a Day As Needed for Muscle Spasms. 30 tablet 0   • insulin NPH-insulin regular (humuLIN 70/30,novoLIN 70/30) (70-30) 100 UNIT/ML injection Inject 40 Units under the skin into the appropriate area as directed Every Morning.     • lisinopril (PRINIVIL,ZESTRIL) 20 MG tablet Take 20 mg by mouth Daily.     • ondansetron (ZOFRAN) 8 MG tablet Take 1 tablet by mouth 3 (Three) Times a Day As Needed for Nausea or Vomiting. 30 tablet 5   •  rosuvastatin (Crestor) 20 MG tablet Take 1 tablet by mouth Every Night. 90 tablet 0   • insulin NPH-insulin regular (humuLIN 70/30,novoLIN 70/30) (70-30) 100 UNIT/ML injection Inject 30 Units under the skin into the appropriate area as directed Every Evening.     • oxyCODONE-acetaminophen (PERCOCET) 5-325 MG per tablet        No facility-administered medications prior to visit.       Opioid medication/s are on active medication list.  and I have evaluated her active treatment plan and pain score trends (see table).  There were no vitals filed for this visit.  I have reviewed the chart for potential of high risk medication and harmful drug interactions in the elderly.            Aspirin is on active medication list. Aspirin use is indicated based on review of current medical condition/s. Pros and cons of this therapy have been discussed today. Benefits of this medication outweigh potential harm.  Patient has been encouraged to continue taking this medication.  .      Patient Active Problem List   Diagnosis   • Hyperlipidemia   • Hypertensive disorder   • Osteoarthritis of multiple joints   • Pulmonary hypertension (HCC)   • Pulmonary valve disorder   • Malignant tumor of breast (HCC)   • Tetralogy of Fallot   • Tricuspid valve regurgitation   • Controlled type 2 diabetes mellitus without complication, with long-term current use of insulin (HCC)   • Vitamin D deficiency   • Acquired spondylolisthesis of cervical vertebra   • Adjacent segment disease with spinal stenosis   • Cervical spondylosis with myelopathy   • Cervical myelopathy with cervical radiculopathy (CMS/HCC)   • Osteoporosis   • S/P cervical spinal fusion   • Lesion of lumbar spine   • Atherosclerosis of coronary artery of native heart without angina pectoris   • Rib pain on right side   • Memory loss of unknown cause   • Malignant neoplasm of female breast (HCC)   • Bone metastasis (HCC)   • Supraventricular tachycardia (HCC)   • Thrombocytopenia (HCC)  "  • Systolic congestive heart failure (HCC)   • COVID-19   • Elevated AST (SGOT)   • Hyponatremia     Advance Care Planning  Advance Directive is not on file.  ACP discussion was held with the patient during this visit. Patient does not have an advance directive, information provided.          Objective    Vitals:    10/11/21 1035   BP: 170/100   BP Location: Right arm   Cuff Size: Adult   Pulse: 101   Resp: 18   Temp: 96.9 °F (36.1 °C)   TempSrc: Infrared   SpO2: 95%   Weight: 68 kg (150 lb)   Height: 154.9 cm (61\")     BMI Readings from Last 1 Encounters:   10/11/21 28.34 kg/m²   BMI is above normal parameters. Recommendations include: exercise counseling    Does the patient have evidence of cognitive impairment? No    Physical Exam  Lab Results   Component Value Date    TRIG 75 09/01/2021    HDL 27 (L) 09/01/2021    LDL 78 09/01/2021    VLDL 15 09/01/2021            HEALTH RISK ASSESSMENT    Smoking Status:  Social History     Tobacco Use   Smoking Status Never Smoker   Smokeless Tobacco Never Used     Alcohol Consumption:  Social History     Substance and Sexual Activity   Alcohol Use Yes   • Alcohol/week: 1.0 standard drink   • Types: 1 Glasses of wine per week    Comment: rare     Fall Risk Screen:    STEADI Fall Risk Assessment has not been completed.    Depression Screening:  PHQ-2/PHQ-9 Depression Screening 10/11/2021   Little interest or pleasure in doing things 0   Feeling down, depressed, or hopeless 0   Total Score 0       Health Habits and Functional and Cognitive Screening:  Functional & Cognitive Status 10/11/2021   Do you have difficulty preparing food and eating? No   Do you have difficulty bathing yourself, getting dressed or grooming yourself? No   Do you have difficulty using the toilet? No   Do you have difficulty moving around from place to place? No   Do you have trouble with steps or getting out of a bed or a chair? No   Current Diet Well Balanced Diet   Dental Exam Not up to date   Eye Exam " Not up to date   Current Exercises Include Walking   Do you need help using the phone?  No   Are you deaf or do you have serious difficulty hearing?  No   Do you need help with transportation? No   Do you need help shopping? No   Do you need help preparing meals?  No   Do you need help with housework?  No   Do you need help with laundry? No   Do you need help taking your medications? Yes   Do you need help managing money? No   Have you felt unusual stress, anger or loneliness in the last month? No   Who do you live with? Spouse   If you need help, do you have trouble finding someone available to you? No   Do you have difficulty concentrating, remembering or making decisions? Yes       Age-appropriate Screening Schedule:  Refer to the list below for future screening recommendations based on patient's age, sex and/or medical conditions. Orders for these recommended tests are listed in the plan section. The patient has been provided with a written plan.    Health Maintenance   Topic Date Due   • TDAP/TD VACCINES (1 - Tdap) Never done   • DIABETIC FOOT EXAM  Never done   • PAP SMEAR  Never done   • DIABETIC EYE EXAM  Never done   • INFLUENZA VACCINE  Never done   • ZOSTER VACCINE (1 of 2) 06/23/2022 (Originally 9/1/2012)   • HEMOGLOBIN A1C  12/14/2021   • URINE MICROALBUMIN  06/14/2022   • LIPID PANEL  09/01/2022   • DXA SCAN  01/29/2023              Assessment/Plan   CMS Preventative Services Quick Reference  Risk Factors Identified During Encounter  Chronic Pain   The above risks/problems have been discussed with the patient.  Follow up actions/plans if indicated are seen below in the Assessment/Plan Section.  Pertinent information has been shared with the patient in the After Visit Summary.    Diagnoses and all orders for this visit:    1. Medicare annual wellness visit, subsequent (Primary)        Follow Up:   No follow-ups on file.     An After Visit Summary and PPPS were made available to the patient.        I  spent 31 minutes caring for Gladys on this date of service. This time includes time spent by me in the following activities:preparing for the visit

## 2021-10-15 NOTE — PROGRESS NOTES
Hematology/Oncology Outpatient Follow Up    PATIENT NAME:Gladys Garrison  :1960  MRN: [id number]  PRIMARY CARE PHYSICIAN: Angelica Rodriguez MD  REFERRING PHYSICIAN: Angelica Rodriguez MD    Chief Complaint   Patient presents with   • Follow-up     Malignant neoplasm of female breast         HISTORY OF PRESENT ILLNESS:     This is a 60-year-old female who multiple comorbidities including congenital abnormalities such as hypoplastic kidney, tetralogy of Fallot, coarctation of the aorta and congenital VSD status post repair.  Patient developed syncopal episode and for that reason she had she had a CT scan of the chest which showed mass in the left breast.  She had diagnostic mammogram and ultrasound which showed a 2 cm spiculated mass in the left breast at the 1 o'clock position 6 cm from the nipple.  Biopsy of the left breast mass revealed invasive moderately differentiated carcinoma ER/AL positive and HER-2/bishop negative.  Patient also had an ultrasound of the axilla which was concerning for an abnormal left axillary lymph node with cortical thickening.  She apparently had an FNA of the left axillary nodule which was positive for malignancy.  On 2017 patient underwent left modified radical mastectomy, right prophylactic mastectomy and immediate breast reconstruction.  She also underwent right prophylactic mastectomy.  We have had her on records suggest that patient did have multifocal disease with pT2 pN1 aM0.  The largest focus measured 3.5 cm.  2 of 11 lymph nodes were positive for metastatic disease some with extracapsular extension.  Notes that patient did receive Arimidex neoadjuvant  from 2017 to 2018 prior to her bilateral mastectomies.    Review of her note suggest that she developed cough which she attributed to anastrozole and patient was then placed on tamoxifen.  She had MammaPrint testing which returned with low risk for relapse.    Her postop course was also complicated  by left chest wall abscess which resulted in I&D and removal of the left tissue expander. She was referred for radiation treatment boards ultimately declined.    Patient was then placed on tamoxifen in April 2018 which she stopped after less than a month due to nausea and vomiting.  Patient in the interim also was diagnosed with chronic hepatitis C was seen by the hepatologist.  Tamoxifen was dose reduced to 10 mg daily with the goal to increase to 20 mg daily.      Patient has relocated to [facility].  She has transitioned her care to us now.  She is currently not on any antiestrogen therapy.     Her bilateral mastectomy specimen are available for review    · 1/29/2021 patient had bone density which showed osteoporosis  · Patient was seen by neurosurgery and had a bone scan done in March 2021 which showed subtle activity in the inferior L4 vertebral body appears to correlate with new area of sclerosis on CT of the abdomen and pelvis.  There is also subtle activity with possible new lesion at the posterior left sacrum.  These findings were concerning for metastatic disease.  PET CT scan was recommended to further evaluate.  · 4/8/2021 patient had a PET CT scan which showed evidence of disease in the neck chest abdomen and pelvis.  But she has a 1.1 cm mixed lytic/sclerotic hypermetabolic lesion within the left hemisacrum consistent with metastatic disease.  There is also accumulation within the inferior endplate of the L4 vertebral body thought to correspond to 1.2 centimeters sclerotic lesion consistent with metastatic disease.  There is a tiny hypermetabolic focus within the L3, T11.  Suspicious for also early metastatic disease.  · 4/26/2021 patient underwent CT-guided biopsy of sacral lesion, pathology was consistent with metastatic breast cancer ER and PA positive HER-2/bishop was negative.  · 7/6/21 CT new sclerosis within the right 12th rib posteriorly which could represent metastatic lesion or a healed or  healing fracture, new sclerosis within the right 12th rib posteriorly which         could represent metastatic lesion,new sclerosis within the right T8 transverse process worrisome for metastatic disease progression.  · 21 complaints of 8/10 back pain and 8/10 LUQ pain. Referral to radiation for questionable mets on CT chest.  · 2021 patient had CT scan of the head with contrast with did not show any evidence of metastatic disease.  CT scan of the chest abdomen and pelvis did not show any evidence of progressive disease.  · 2021 patient had CEA level which shows declining values CA 15-3 has decreased to 62 from 84    History of present illness was reviewed and is unchanged from the previous visit. 21      Past Medical History:   Diagnosis Date   • Allergic    • Bone cancer (HCC)    • Breast cancer (HCC) 2017    mets to lymph nodes; did not do radiation   • Cancer of unknown origin (HCC)    • Compression fx, thoracic spine, open, initial encounter (HCC)    • Coronary artery disease    • COVID-19 2021   • Diabetes mellitus (HCC)    • Heart disease, unspecified    • Hepatitis C    • Hypertension    • Obesity (BMI 30-39.9) 2021   • Sleep apnea     no machine   • Type 2 diabetes mellitus (HCC) 2017       Past Surgical History:   Procedure Laterality Date   • BACK SURGERY      neck   • BREAST RECONSTRUCTION Bilateral    • CARDIAC ABLATION       atrial tachycardia x 5 ablations    • CARDIAC CATHETERIZATION     • CARDIAC SURGERY      stent placed in aorta   • CARDIAC SURGERY      6 surgeries as baby    • CERVICAL FUSION ANTERIOR WITH ARTIFICIAL DISCECTOMY IMPLANTATION N/A 2020    Procedure: C4 VERTEBRECTOMY AND ANTERIOR CERIVCAL DISCECTOMY WITH FUSION OF CERVICAL THREE THROUGH FIVE WITH REMOVAL OF HARDWARE C5-C6;  Surgeon: Mark Kaba MD;  Location: [facility] MAIN OR;  Service: Neurosurgery;  Laterality: N/A;   •  SECTION      x2   • COLONOSCOPY N/A 10/23/2020     Procedure: COLONOSCOPY WITH POLYPECTOMY X6;  Surgeon: Stanley Bourne MD;  Location: [facility] ENDOSCOPY;  Service: Gastroenterology;  Laterality: N/A;  POLYPS, INTERNAL HEMORRHOIDS   • ENDOSCOPY N/A 10/23/2020    Procedure: ESOPHAGOGASTRODUODENOSCOPY WITH BIOPSY X 1 AREA;  Surgeon: Stanley Bourne MD;  Location: [facility] ENDOSCOPY;  Service: Gastroenterology;  Laterality: N/A;  GASTRITIS, ESOPHAGITIS, HIATAL HERNIA   • KNEE SURGERY  2000   • MASTECTOMY Bilateral    • NECK SURGERY     • PACEMAKER IMPLANTATION           Current Outpatient Medications:   •  amLODIPine (Norvasc) 5 MG tablet, Take 1 tablet by mouth Daily., Disp: 30 tablet, Rfl: 1  •  anastrozole (ARIMIDEX) 1 MG tablet, Take 1 tablet by mouth Daily., Disp: 30 tablet, Rfl: 6  •  aspirin 81 MG chewable tablet, Chew 1 tablet Daily., Disp: 30 tablet, Rfl: 1  •  Calcium Carbonate-Vit D-Min (Calcium 600+D Plus Minerals) 600-400 MG-UNIT chewable tablet, Chew 600 mg 2 (Two) Times a Day., Disp: 60 each, Rfl: 6  •  celecoxib (CeleBREX) 200 MG capsule, Take 1 capsule by mouth Daily., Disp: 30 capsule, Rfl: 1  •  cyclobenzaprine (FLEXERIL) 10 MG tablet, Take 1 tablet by mouth 2 (Two) Times a Day As Needed for Muscle Spasms., Disp: 30 tablet, Rfl: 0  •  insulin NPH-insulin regular (humuLIN 70/30,novoLIN 70/30) (70-30) 100 UNIT/ML injection, Inject 40 Units under the skin into the appropriate area as directed Every Morning., Disp: , Rfl:   •  insulin NPH-insulin regular (humuLIN 70/30,novoLIN 70/30) (70-30) 100 UNIT/ML injection, Inject 30 Units under the skin into the appropriate area as directed Every Evening., Disp: , Rfl:   •  lisinopril (PRINIVIL,ZESTRIL) 20 MG tablet, Take 20 mg by mouth Daily., Disp: , Rfl:   •  ondansetron (ZOFRAN) 8 MG tablet, Take 1 tablet by mouth 3 (Three) Times a Day As Needed for Nausea or Vomiting., Disp: 30 tablet, Rfl: 5  •  oxyCODONE-acetaminophen (PERCOCET) 5-325 MG per tablet, , Disp: , Rfl:   •  rosuvastatin  (Crestor) 20 MG tablet, Take 1 tablet by mouth Every Night., Disp: 90 tablet, Rfl: 0    Allergies   Allergen Reactions   • Promethazine Mental Status Change   • Tape Other (See Comments)     .blisters         Family History   Problem Relation Age of Onset   • Heart disease Mother    • Stroke Mother    • Lung cancer Mother    • Aneurysm Father    • Diabetes Sister    • No Known Problems Brother    • No Known Problems Brother    • Diabetes type I Half-Sister    • Thyroid cancer Half-Sister    • Cancer Maternal Aunt    • Heart attack Sister    • Thyroid disease Sister    • No Known Problems Sister        Cancer-related family history includes Cancer in her maternal aunt; Lung cancer in her mother; Thyroid cancer in her half-sister.    Social History     Tobacco Use   • Smoking status: Never Smoker   • Smokeless tobacco: Never Used   Vaping Use   • Vaping Use: Never used   Substance Use Topics   • Alcohol use: Yes     Alcohol/week: 1.0 standard drink     Types: 1 Glasses of wine per week     Comment: rare   • Drug use: Not Currently       HPI, ROS and PFSH have been reviewed and confirmed on 10/18/2021.       SUBJECTIVE:    She is here today for routine follow-up.  She complains of emotional issues, stressful situations in the family.  Patient has been seen briefly by /counselor today            REVIEW OF SYSTEMS:    Review of Systems   Constitutional: Negative for chills and fever.   HENT: Negative for ear pain, mouth sores, nosebleeds and sore throat.    Eyes: Negative for photophobia and visual disturbance.   Respiratory: Negative for wheezing and stridor.    Cardiovascular: Negative for chest pain and palpitations.   Gastrointestinal: Negative for abdominal pain, diarrhea, nausea and vomiting.   Endocrine: Negative for cold intolerance and heat intolerance.   Genitourinary: Negative for dysuria and hematuria.   Musculoskeletal: Negative for joint swelling and neck stiffness.   Skin: Negative for color  "change and rash.   Neurological: Negative for seizures and syncope.   Hematological: Negative for adenopathy.        No obvious bleeding   Psychiatric/Behavioral: Negative for agitation, confusion and hallucinations.     OBJECTIVE:    Vitals:    10/18/21 0846   BP: 168/93   Pulse: 87   Resp: 20   SpO2: 98%   Height: 154.9 cm (61\")   PainSc:   6   PainLoc: Chest     Body mass index is 28.34 kg/m².    ECOG    (1) Restricted in physically strenuous activity, ambulatory and able to do work of light nature    Physical Exam  Vitals and nursing note reviewed.   Constitutional:       General: She is not in acute distress.     Appearance: Normal appearance. She is not diaphoretic.   HENT:      Head: Normocephalic and atraumatic.      Mouth/Throat:      Mouth: Mucous membranes are moist.      Pharynx: Oropharynx is clear.   Eyes:      General: No scleral icterus.        Right eye: No discharge.         Left eye: No discharge.      Extraocular Movements: Extraocular movements intact.      Conjunctiva/sclera: Conjunctivae normal.   Neck:      Thyroid: No thyromegaly.   Cardiovascular:      Rate and Rhythm: Normal rate and regular rhythm.      Pulses: Normal pulses.      Heart sounds: Normal heart sounds. No friction rub. No gallop.    Pulmonary:      Effort: Pulmonary effort is normal. No respiratory distress.      Breath sounds: Normal breath sounds. No stridor. No wheezing.   Abdominal:      General: Bowel sounds are normal. There is distension.      Palpations: Abdomen is soft. There is no mass.      Tenderness: There is abdominal tenderness. There is guarding (Left upper abdomen). There is no rebound.   Musculoskeletal:         General: No tenderness. Normal range of motion.      Cervical back: Normal range of motion and neck supple.      Right lower leg: No edema.      Left lower leg: No edema.      Comments: Neck pain.  Patient has a neck collar.   Lymphadenopathy:      Cervical: No cervical adenopathy.   Skin:     " General: Skin is warm and dry.      Capillary Refill: Capillary refill takes less than 2 seconds.      Findings: No bruising, erythema or rash.   Neurological:      Mental Status: She is alert and oriented to person, place, and time.      Cranial Nerves: No cranial nerve deficit.      Sensory: No sensory deficit.      Motor: No abnormal muscle tone.   Psychiatric:         Mood and Affect: Mood normal.         Behavior: Behavior normal.         Thought Content: Thought content normal.         Judgment: Judgment normal.     I have reexamined the patient and the results are consistent with the previously documented exam. Марияmiles Nunez MD     RECENT LABS      WBC   Date Value Ref Range Status   10/18/2021 4.14 3.40 - 10.80 10*3/mm3 Final     RBC   Date Value Ref Range Status   10/18/2021 4.44 3.77 - 5.28 10*6/mm3 Final     Hemoglobin   Date Value Ref Range Status   10/18/2021 13.5 12.0 - 15.9 g/dL Final     Hematocrit   Date Value Ref Range Status   10/18/2021 41.9 34.0 - 46.6 % Final     MCV   Date Value Ref Range Status   10/18/2021 94.4 79.0 - 97.0 fL Final     MCH   Date Value Ref Range Status   10/18/2021 30.4 26.6 - 33.0 pg Final     MCHC   Date Value Ref Range Status   10/18/2021 32.2 31.5 - 35.7 g/dL Final     RDW   Date Value Ref Range Status   10/18/2021 14.8 12.3 - 15.4 % Final     RDW-SD   Date Value Ref Range Status   10/18/2021 49.7 37.0 - 54.0 fl Final     MPV   Date Value Ref Range Status   10/18/2021 10.3 6.0 - 12.0 fL Final     Platelets   Date Value Ref Range Status   10/18/2021 145 140 - 450 10*3/mm3 Final     Neutrophil %   Date Value Ref Range Status   10/18/2021 62.1 42.7 - 76.0 % Final     Lymphocyte %   Date Value Ref Range Status   10/18/2021 27.8 19.6 - 45.3 % Final     Monocyte %   Date Value Ref Range Status   10/18/2021 7.5 5.0 - 12.0 % Final     Eosinophil %   Date Value Ref Range Status   10/18/2021 1.9 0.3 - 6.2 % Final     Basophil %   Date Value Ref Range Status   10/18/2021  0.7 0.0 - 1.5 % Final     Immature Grans %   Date Value Ref Range Status   07/20/2020 0.7 (H) 0.0 - 0.5 % Final     Neutrophils, Absolute   Date Value Ref Range Status   10/18/2021 2.57 1.70 - 7.00 10*3/mm3 Final     Lymphocytes, Absolute   Date Value Ref Range Status   10/18/2021 1.15 0.70 - 3.10 10*3/mm3 Final     Monocytes, Absolute   Date Value Ref Range Status   10/18/2021 0.31 0.10 - 0.90 10*3/mm3 Final     Eosinophils, Absolute   Date Value Ref Range Status   10/18/2021 0.08 0.00 - 0.40 10*3/mm3 Final     Basophils, Absolute   Date Value Ref Range Status   10/18/2021 0.03 0.00 - 0.20 10*3/mm3 Final     Immature Grans, Absolute   Date Value Ref Range Status   07/20/2020 0.05 0.00 - 0.05 10*3/mm3 Final     nRBC   Date Value Ref Range Status   09/01/2021 0.4 (H) 0.0 - 0.2 /100 WBC Final       Lab Results   Component Value Date    GLUCOSE 284 (H) 09/02/2021    BUN 18 09/02/2021    CREATININE 0.68 09/02/2021    EGFRIFNONA 89 09/02/2021    EGFRIFAFRI >60 10/12/2018    BCR 26.5 (H) 09/02/2021    K 4.2 09/02/2021    CO2 20.0 (L) 09/02/2021    CALCIUM 8.7 09/02/2021    PROTENTOTREF 7.4 12/14/2020    ALBUMIN 4.00 09/02/2021    LABIL2 0.9 12/14/2020    AST 34 (H) 09/02/2021    ALT 25 09/02/2021           ASSESSMENT:    · Metastatic breast cancer presenting as 1.1 cm mixed lytic/sclerotic hypermetabolic lesion in the left hemisacrum suspicious for metastatic disease 1.2 cm sclerotic lesion on L4, small L3 lesion and T11 lesion.  Tumor is ER positive, OH positive and HER-2/bishop negative.  Patient is currently on aromatase inhibitor, I have therefore recommended addition of CDK 4 inhibitor with Ibrance.  She will also benefit from biphosphonate therapy to prevent skeletal fractures.  Will discontinue Prolia and begin Xgeva as well.  Reviewed the side effects of Ibrance in detail with patient to include but not limited to fatigue, hepatic function abnormalities, interstitial lung disease, pancytopenia.  She is currently on  combination of Ibrance, Arimidex and (Xgeva to be started once her dental procedure has been completed).  Her scans and tumor markers had suggest that she is responding to treatment.  Proceed with restaging CT scan of the chest, abdomen and pelvis.  · Tooth ache probable secondary to infection.  Patient has appointment with dentist coming up.  She is currently on antibiotics.  She will let me know once her dental procedures are done.  Patient has not received Xgeva as she is still undergoing dental treatments.  She has been encouraged to let me know once completed so we can start her biphosphonate therapy  · Pancytopenia secondary to Ibrance: Reviewed  · ypT2 N1 aM0 status post left modified radical mastectomy with lymph node dissection and right prophylactic mastectomy in 2017 performed at [facility].  ER positive, TX positive and HER-2/bishop negative.  Status post bilateral chest wall reconstruction.  According to patient, she tolerated Arimidex very well except that she also had osteoporosis therefore Arimidex was discontinued at that time  · Intolerance of tamoxifen in the past  · Osteoporosis: She was on Prolia: We will transition to Xgeva since she has bone metastases  · Status post neoadjuvant Arimidex prior to bilateral mastectomies  · Thrombocytopenia: Work-up was - December 2020  · Neck pain status post cervical spine surgery: Patient has a soft neck collar  · Complex cardiac medical history including tetralogy of Fallot, coarctation of aorta, VSD status post repair.  Status post stent placement for coarctation of aorta  · Personal history and strong family history of breast cancer in multiple in the relatives on paternal side of the family including 4 paternal aunts in their 30s and 40s and 2 maternal aunts at age of 20s and 50s.  There is concern for possible hereditary breast cancer syndrome.  Patient may be  interested in pursuing cancer genetics for her management.  · Assessment has  been reviewed and updated          Discussion    Patient has metastatic breast cancer estrogen and progesterone dependent and HER-2/bishop negative.  She is currently on Arimidex.  We will add Ibrance.  We will also discontinue Prolia and begin Xgeva to help reduce skeletal events.           Plans:     · CT scan of the chest, abdomen and pelvis for cancer restaging  · CBC, CMP, CEA, CA 27.29 and CA 15-3 today  · Follow-up with /counselor today to address social and emotional needs  · Follow-up with dentist as soon as possible  · Will continue to hold Xgeva until all dental procedures have been completed and she has had a follow-up visit   · Continue Arimidex, Ibrance: Reviewed with patient  · Reviewed path report  · Continue Percocet 5 mg p.o. every 4-6 hours as needed for pain number 40 tablets  · Reviewed work-up for thrombocytopenia which was negative  · Reviewed her bone density which showed osteoporosis  · Hold Xgeva 120 mg subcu monthly Xgeva has not been started yet: dental work not completed yet  · Continue Os-Chetan D 600 mg tablets twice a day   · All questions answered  · Follow-up in 2 weeks or earlier as needed for problems  · All questions answered                     Patient verbalized understanding and is in agreement of the above plan.       I spent 40 total minutes, face-to-face, caring for Gladys today.  90% of this time involved counseling and/or coordination of care as documented within this note.

## 2021-10-18 ENCOUNTER — DOCUMENTATION (OUTPATIENT)
Dept: ONCOLOGY | Facility: HOSPITAL | Age: 61
End: 2021-10-18

## 2021-10-18 ENCOUNTER — OFFICE VISIT (OUTPATIENT)
Dept: ONCOLOGY | Facility: CLINIC | Age: 61
End: 2021-10-18

## 2021-10-18 ENCOUNTER — LAB (OUTPATIENT)
Dept: LAB | Facility: HOSPITAL | Age: 61
End: 2021-10-18

## 2021-10-18 VITALS
SYSTOLIC BLOOD PRESSURE: 168 MMHG | RESPIRATION RATE: 20 BRPM | HEIGHT: 61 IN | HEART RATE: 87 BPM | BODY MASS INDEX: 28.34 KG/M2 | OXYGEN SATURATION: 98 % | DIASTOLIC BLOOD PRESSURE: 93 MMHG

## 2021-10-18 DIAGNOSIS — M89.9 LESION OF LUMBAR SPINE: ICD-10-CM

## 2021-10-18 DIAGNOSIS — C79.51 SPINE METASTASIS: ICD-10-CM

## 2021-10-18 DIAGNOSIS — C50.919 MALIGNANT NEOPLASM OF FEMALE BREAST, UNSPECIFIED ESTROGEN RECEPTOR STATUS, UNSPECIFIED LATERALITY, UNSPECIFIED SITE OF BREAST (HCC): Primary | ICD-10-CM

## 2021-10-18 DIAGNOSIS — C50.919 MALIGNANT NEOPLASM OF FEMALE BREAST, UNSPECIFIED ESTROGEN RECEPTOR STATUS, UNSPECIFIED LATERALITY, UNSPECIFIED SITE OF BREAST (HCC): ICD-10-CM

## 2021-10-18 LAB
ALBUMIN SERPL-MCNC: 4.7 G/DL (ref 3.5–5.2)
ALBUMIN/GLOB SERPL: 1.6 G/DL
ALP SERPL-CCNC: 99 U/L (ref 39–117)
ALT SERPL W P-5'-P-CCNC: 24 U/L (ref 1–33)
ANION GAP SERPL CALCULATED.3IONS-SCNC: 12.5 MMOL/L (ref 5–15)
AST SERPL-CCNC: 30 U/L (ref 1–32)
BASOPHILS # BLD AUTO: 0.03 10*3/MM3 (ref 0–0.2)
BASOPHILS NFR BLD AUTO: 0.7 % (ref 0–1.5)
BILIRUB SERPL-MCNC: 0.5 MG/DL (ref 0–1.2)
BUN SERPL-MCNC: 16 MG/DL (ref 6–20)
BUN/CREAT SERPL: 20.5 (ref 7–25)
CALCIUM SPEC-SCNC: 9.6 MG/DL (ref 8.6–10.5)
CANCER AG15-3 SERPL-ACNC: 91.7 U/ML
CHLORIDE SERPL-SCNC: 102 MMOL/L (ref 98–107)
CO2 SERPL-SCNC: 23.5 MMOL/L (ref 22–29)
CREAT SERPL-MCNC: 0.78 MG/DL (ref 0.57–1)
DEPRECATED RDW RBC AUTO: 49.7 FL (ref 37–54)
EOSINOPHIL # BLD AUTO: 0.08 10*3/MM3 (ref 0–0.4)
EOSINOPHIL NFR BLD AUTO: 1.9 % (ref 0.3–6.2)
ERYTHROCYTE [DISTWIDTH] IN BLOOD BY AUTOMATED COUNT: 14.8 % (ref 12.3–15.4)
GFR SERPL CREATININE-BSD FRML MDRD: 76 ML/MIN/1.73
GLOBULIN UR ELPH-MCNC: 3 GM/DL
GLUCOSE SERPL-MCNC: 213 MG/DL (ref 65–99)
HCT VFR BLD AUTO: 41.9 % (ref 34–46.6)
HGB BLD-MCNC: 13.5 G/DL (ref 12–15.9)
LYMPHOCYTES # BLD AUTO: 1.15 10*3/MM3 (ref 0.7–3.1)
LYMPHOCYTES NFR BLD AUTO: 27.8 % (ref 19.6–45.3)
MCH RBC QN AUTO: 30.4 PG (ref 26.6–33)
MCHC RBC AUTO-ENTMCNC: 32.2 G/DL (ref 31.5–35.7)
MCV RBC AUTO: 94.4 FL (ref 79–97)
MONOCYTES # BLD AUTO: 0.31 10*3/MM3 (ref 0.1–0.9)
MONOCYTES NFR BLD AUTO: 7.5 % (ref 5–12)
NEUTROPHILS NFR BLD AUTO: 2.57 10*3/MM3 (ref 1.7–7)
NEUTROPHILS NFR BLD AUTO: 62.1 % (ref 42.7–76)
PLATELET # BLD AUTO: 145 10*3/MM3 (ref 140–450)
PMV BLD AUTO: 10.3 FL (ref 6–12)
POTASSIUM SERPL-SCNC: 4.5 MMOL/L (ref 3.5–5.2)
PROT SERPL-MCNC: 7.7 G/DL (ref 6–8.5)
RBC # BLD AUTO: 4.44 10*6/MM3 (ref 3.77–5.28)
SODIUM SERPL-SCNC: 138 MMOL/L (ref 136–145)
WBC # BLD AUTO: 4.14 10*3/MM3 (ref 3.4–10.8)

## 2021-10-18 PROCEDURE — 85025 COMPLETE CBC W/AUTO DIFF WBC: CPT

## 2021-10-18 PROCEDURE — 36415 COLL VENOUS BLD VENIPUNCTURE: CPT | Performed by: INTERNAL MEDICINE

## 2021-10-18 PROCEDURE — 99215 OFFICE O/P EST HI 40 MIN: CPT | Performed by: INTERNAL MEDICINE

## 2021-10-18 PROCEDURE — 80053 COMPREHEN METABOLIC PANEL: CPT | Performed by: INTERNAL MEDICINE

## 2021-10-18 PROCEDURE — 86300 IMMUNOASSAY TUMOR CA 15-3: CPT | Performed by: INTERNAL MEDICINE

## 2021-10-18 NOTE — PROGRESS NOTES
Case Management/ Note    Patient Name: Gladys Garrison  YOB: 1960  MRN #: [id number]    OSW met with Gladys at the request of PRANAY Grimaldo. She is alert and oriented to person, place and time. Mood and affect is congruent. She verbalized being upset over family conflict between herself and her daughter-in-law, Bob. She said she had several of her grandchildren over the weekend. Bob sent her several recordings of her asking her children, ages 5, 7 and 8 what happened this weekend and how they felt about their grandmother. She is upset because the children are telling their mother how much they hate her and don't want to be there. She states the children always have fun at her home, and she noted they were with several cousins. She said Bob feels she is neglectful because the children said they only had one bath in four days. She states otherwise. She spoke about how she cared for the children this weekend. Nothing of neglect or abuse was noted. She also spoke of the family dog, a [place]ic omer and how the youngest child thought she had been bit. The child was not bit by the dog, according to Gladys. She said the children do slap at the dog to get him away and she was highly encouraged to teach the children not to hit animals as this could cause the animal to bite. She gave v/u. She said she has tried to reach out to Bob but she won't respond to her texts/calls.     She also spoke about conflict with her , Lavon. She said he is supportive. She said they deal with the children and grandchildren in different ways and this causes conflict. She denies any SI / HI. OSW will remain available.      Electronically signed by:   Kita Falcon LCSW, OSW-C  10/18/21, 10:49 EDT

## 2021-10-19 ENCOUNTER — MEDICATION THERAPY MANAGEMENT (OUTPATIENT)
Dept: PHARMACY | Facility: HOSPITAL | Age: 61
End: 2021-10-19

## 2021-10-19 LAB
CANCER AG27-29 SERPL-ACNC: 158.2 U/ML (ref 0–38.6)
CEA SERPL-MCNC: 6.9 NG/ML (ref 0–4.7)

## 2021-10-19 NOTE — PROGRESS NOTES
[facility] lab review: Ibrance      10/18/2021   WBC 3.40 - 10.80 10*3/mm3 4.14   Neutrophils Absolute 1.70 - 7.00 10*3/mm3 2.57   Hemoglobin 12.0 - 15.9 g/dL 13.5   Hematocrit 34.0 - 46.6 % 41.9   Platelets 140 - 450 10*3/mm3 145   Creatinine 0.57 - 1.00 mg/dL 0.78   eGFR Non African Am >60 mL/min/1.73 76   BUN 6 - 20 mg/dL 16   Sodium 136 - 145 mmol/L 138   Potassium 3.5 - 5.2 mmol/L 4.5   Glucose 65 - 99 mg/dL 213 (A)   Calcium 8.6 - 10.5 mg/dL 9.6   Albumin 3.50 - 5.20 g/dL 4.70   Total Protein 6.0 - 8.5 g/dL 7.7   AST (SGOT) 1 - 32 U/L 30   ALT (SGPT) 1 - 33 U/L 24   Alkaline Phosphatase 39 - 117 U/L 99   Total Bilirubin 0.0 - 1.2 mg/dL 0.5   Will continue to follow.  Vicente Davis, MilesD BCPS

## 2021-10-21 ENCOUNTER — PATIENT OUTREACH (OUTPATIENT)
Dept: CASE MANAGEMENT | Facility: OTHER | Age: 61
End: 2021-10-21

## 2021-10-21 NOTE — OUTREACH NOTE
Ambulatory Case Management Note    General & Health Literacy Assessment    Questions/Answers      Most Recent Value   Assessment Completed With Patient   Living Arrangement Spouse   Type of Residence Private Residence   Communication Device Yes   Bed or Wheelchair Confined No   Difficulty Keeping Appointments Yes   Chronic Pain Yes        SDOH updated and reviewed with the patient during this program:     Financial Resource Strain: Medium Risk   • Difficulty of Paying Living Expenses: Somewhat hard       Food Insecurity: No Food Insecurity   • Worried About Running Out of Food in the Last Year: Never true   • Ran Out of Food in the Last Year: Never true       Transportation Needs: No Transportation Needs   • Lack of Transportation (Medical): No   • Lack of Transportation (Non-Medical): No     Patient Outreach    RN-ACM outreach call made to pt on behalf of Lizzie pt identified for proactive outreach, able to reach pt on 2nd attempt, spoke very briefly. Explained role of RN-ACM. Discussed last hospital admission. Pt states she's doing fine, feeling better. She reports dry cough, chronic SOA. Denies CP, fever, or other symptoms. Unable to further outreach, pt thanks RN-ACM for calling and ended call. Follow up outreach as needed.     Robel Noe RN  Ambulatory Case Management    10/21/2021, 11:40 EDT

## 2021-10-22 ENCOUNTER — TELEPHONE (OUTPATIENT)
Dept: ONCOLOGY | Facility: CLINIC | Age: 61
End: 2021-10-22

## 2021-10-22 NOTE — TELEPHONE ENCOUNTER
Caller: Gladys Garrison    Relationship: Self    Best call back number: [phone]    What was the call regarding: CALLED WANTED DR LYLE OFFICE TO KNOW SHE HAS UPDATED PHONE NUMBER IF HAVE BEEN TRYING TO REACH HER NEW NUMBER -[phone]    Do you require a callback: NO

## 2021-10-25 ENCOUNTER — TELEPHONE (OUTPATIENT)
Dept: FAMILY MEDICINE CLINIC | Facility: CLINIC | Age: 61
End: 2021-10-25

## 2021-10-25 RX ORDER — BLOOD SUGAR DIAGNOSTIC
STRIP MISCELLANEOUS
Qty: 200 EACH | Refills: 3 | Status: SHIPPED | OUTPATIENT
Start: 2021-10-25

## 2021-10-25 NOTE — TELEPHONE ENCOUNTER
Caller: Gladys Garrison    Relationship: Self    Best call back number: [phone]    Equipment requested:ACU CHECK GLUCOSE METER AND TESTING SUPPLIES    Reason for the request:  BLOOD GLUCOSE MONITORING    Additional information or concerns PATIENT CALLED VERY UPSET STATING THAT [facility] HAS FAXED OVER A REQUEST ON 10/8/2021 FOR TESTING SUPPLIES AND HAVE NOT HEARD BACK FROM PCP. PATIENT NEEDS A RESPONSE AS SOON AS POSSIBLE AS SHE DOES NOT HAVE A WAY TO TEST HER GLUCOSE LEVEL.

## 2021-11-01 ENCOUNTER — TELEPHONE (OUTPATIENT)
Dept: ONCOLOGY | Facility: CLINIC | Age: 61
End: 2021-11-01

## 2021-11-01 ENCOUNTER — APPOINTMENT (OUTPATIENT)
Dept: LAB | Facility: HOSPITAL | Age: 61
End: 2021-11-01

## 2021-11-01 NOTE — TELEPHONE ENCOUNTER
Caller: LUCIEN SARGENT     Relationship to patient:   SELF  Best call back number: [phone]  PATIENT ISN'T FEELING WELL. SHE ASKED TO R./[facility] AND FOLLOW UP WITH DR. LYLE.    MY ATTEMPT TO WARM TRANSFER WAS NOT SUCCESSFUL.   PATIENT ALSO WANTED TO KNOW ABOUT CT'S THAT WERE TO BE SCHEDULED.   PATIENT HUNG UP WHILE I WAS ATTEMPTING TO REACH THE OFFICE.   THANK YOU

## 2021-11-03 ENCOUNTER — HOSPITAL ENCOUNTER (OUTPATIENT)
Dept: PET IMAGING | Facility: HOSPITAL | Age: 61
Discharge: HOME | End: 2021-11-03
Admitting: INTERNAL MEDICINE

## 2021-11-03 ENCOUNTER — TELEPHONE (OUTPATIENT)
Dept: ONCOLOGY | Facility: HOSPITAL | Age: 61
End: 2021-11-03

## 2021-11-03 DIAGNOSIS — C79.51 SPINE METASTASIS: ICD-10-CM

## 2021-11-03 DIAGNOSIS — C50.919 MALIGNANT NEOPLASM OF FEMALE BREAST, UNSPECIFIED ESTROGEN RECEPTOR STATUS, UNSPECIFIED LATERALITY, UNSPECIFIED SITE OF BREAST (HCC): ICD-10-CM

## 2021-11-03 DIAGNOSIS — M89.9 LESION OF LUMBAR SPINE: ICD-10-CM

## 2021-11-03 PROCEDURE — 71260 CT THORAX DX C+: CPT

## 2021-11-03 PROCEDURE — 0 IOPAMIDOL PER 1 ML: Performed by: INTERNAL MEDICINE

## 2021-11-03 PROCEDURE — 74177 CT ABD & PELVIS W/CONTRAST: CPT

## 2021-11-03 RX ADMIN — IOPAMIDOL 100 ML: 755 INJECTION, SOLUTION INTRAVENOUS at 13:15

## 2021-11-03 NOTE — TELEPHONE ENCOUNTER
Case Management/ Note    Patient Name: Gladys Garrison  YOB: 1960  MRN #: [id number]    OSW called patient at the request of RT Farnaz who said patient did not have food in the home. Patient is alert and oriented to person, place and time. She confirmed that she does not have food and said she was busy and would call back. Resources for food mailed to the home address. OSW will remain available.     Electronically signed by:   Kita Falcon LCSW, OSW-C  11/03/21, 15:55 EDT

## 2021-11-10 NOTE — PROGRESS NOTES
Hematology/Oncology Outpatient Follow Up    PATIENT NAME:Gladys Garrison  :1960  MRN: [id number]  PRIMARY CARE PHYSICIAN: Angelica Rodriguez MD  REFERRING PHYSICIAN: Angelica Rodriguez MD    Chief Complaint   Patient presents with   • Follow-up     Malignant neoplasm of female breast         HISTORY OF PRESENT ILLNESS:     This is a 60-year-old female who multiple comorbidities including congenital abnormalities such as hypoplastic kidney, tetralogy of Fallot, coarctation of the aorta and congenital VSD status post repair.  Patient developed syncopal episode and for that reason she had she had a CT scan of the chest which showed mass in the left breast.  She had diagnostic mammogram and ultrasound which showed a 2 cm spiculated mass in the left breast at the 1 o'clock position 6 cm from the nipple.  Biopsy of the left breast mass revealed invasive moderately differentiated carcinoma ER/AZ positive and HER-2/bishop negative.  Patient also had an ultrasound of the axilla which was concerning for an abnormal left axillary lymph node with cortical thickening.  She apparently had an FNA of the left axillary nodule which was positive for malignancy.  On 2017 patient underwent left modified radical mastectomy, right prophylactic mastectomy and immediate breast reconstruction.  She also underwent right prophylactic mastectomy.  We have had her on records suggest that patient did have multifocal disease with pT2 pN1 aM0.  The largest focus measured 3.5 cm.  2 of 11 lymph nodes were positive for metastatic disease some with extracapsular extension.  Notes that patient did receive Arimidex neoadjuvant  from 2017 to 2018 prior to her bilateral mastectomies.    Review of her note suggest that she developed cough which she attributed to anastrozole and patient was then placed on tamoxifen.  She had MammaPrint testing which returned with low risk for relapse.    Her postop course was also complicated  by left chest wall abscess which resulted in I&D and removal of the left tissue expander. She was referred for radiation treatment boards ultimately declined.    Patient was then placed on tamoxifen in April 2018 which she stopped after less than a month due to nausea and vomiting.  Patient in the interim also was diagnosed with chronic hepatitis C was seen by the hepatologist.  Tamoxifen was dose reduced to 10 mg daily with the goal to increase to 20 mg daily.      Patient has relocated to [facility].  She has transitioned her care to us now.  She is currently not on any antiestrogen therapy.     Her bilateral mastectomy specimen are available for review    · 1/29/2021 patient had bone density which showed osteoporosis  · Patient was seen by neurosurgery and had a bone scan done in March 2021 which showed subtle activity in the inferior L4 vertebral body appears to correlate with new area of sclerosis on CT of the abdomen and pelvis.  There is also subtle activity with possible new lesion at the posterior left sacrum.  These findings were concerning for metastatic disease.  PET CT scan was recommended to further evaluate.  · 4/8/2021 patient had a PET CT scan which showed evidence of disease in the neck chest abdomen and pelvis.  But she has a 1.1 cm mixed lytic/sclerotic hypermetabolic lesion within the left hemisacrum consistent with metastatic disease.  There is also accumulation within the inferior endplate of the L4 vertebral body thought to correspond to 1.2 centimeters sclerotic lesion consistent with metastatic disease.  There is a tiny hypermetabolic focus within the L3, T11.  Suspicious for also early metastatic disease.  · 4/26/2021 patient underwent CT-guided biopsy of sacral lesion, pathology was consistent with metastatic breast cancer ER and IN positive HER-2/bishop was negative.  · 7/6/21 CT new sclerosis within the right 12th rib posteriorly which could represent metastatic lesion or a healed or  healing fracture, new sclerosis within the right 12th rib posteriorly which         could represent metastatic lesion,new sclerosis within the right T8 transverse process worrisome for metastatic        disease progression.  · 7/7/21 complaints of 8/10 back pain and 8/10 LUQ pain. Referral to radiation for questionable mets on CT chest.  · 7/26/2021 patient had CT scan of the head with contrast with did not show any evidence of metastatic disease.  CT scan of the chest abdomen and pelvis did not show any evidence of progressive disease.  · 7/26/2021 patient had CEA level which shows declining values CA 15-3 has decreased to 62 from 84  · 11/3/2021: Patient had CT scan of the chest, abdomen and pelvis. In the chest there were no suspicious pulmonary nodules. There is no clear evidence of progressive disease    History of present illness was reviewed and is unchanged from the previous visit. 07/07/21      Past Medical History:   Diagnosis Date   • Allergic    • Bone cancer (HCC)    • Breast cancer (HCC) 2017    mets to lymph nodes; did not do radiation   • Cancer of unknown origin (HCC)    • Compression fx, thoracic spine, open, initial encounter (HCC)    • Coronary artery disease    • COVID-19 09/01/2021   • Diabetes mellitus (HCC)    • Heart disease, unspecified    • Hepatitis C    • Hypertension    • Obesity (BMI 30-39.9) 2/5/2021   • Sleep apnea     no machine   • Type 2 diabetes mellitus (HCC) 11/2017       Past Surgical History:   Procedure Laterality Date   • BACK SURGERY      neck   • BREAST RECONSTRUCTION Bilateral    • CARDIAC ABLATION       atrial tachycardia x 5 ablations    • CARDIAC CATHETERIZATION     • CARDIAC SURGERY      stent placed in aorta   • CARDIAC SURGERY      6 surgeries as baby    • CERVICAL FUSION ANTERIOR WITH ARTIFICIAL DISCECTOMY IMPLANTATION N/A 9/22/2020    Procedure: C4 VERTEBRECTOMY AND ANTERIOR CERIVCAL DISCECTOMY WITH FUSION OF CERVICAL THREE THROUGH FIVE WITH REMOVAL OF HARDWARE  C5-C6;  Surgeon: Mark Kaba MD;  Location: [facility] MAIN OR;  Service: Neurosurgery;  Laterality: N/A;   •  SECTION      x2   • COLONOSCOPY N/A 10/23/2020    Procedure: COLONOSCOPY WITH POLYPECTOMY X6;  Surgeon: Stanley Bourne MD;  Location: [facility] ENDOSCOPY;  Service: Gastroenterology;  Laterality: N/A;  POLYPS, INTERNAL HEMORRHOIDS   • ENDOSCOPY N/A 10/23/2020    Procedure: ESOPHAGOGASTRODUODENOSCOPY WITH BIOPSY X 1 AREA;  Surgeon: Stanley Bourne MD;  Location: [facility] ENDOSCOPY;  Service: Gastroenterology;  Laterality: N/A;  GASTRITIS, ESOPHAGITIS, HIATAL HERNIA   • KNEE SURGERY     • MASTECTOMY Bilateral    • NECK SURGERY     • PACEMAKER IMPLANTATION           Current Outpatient Medications:   •  amLODIPine (Norvasc) 5 MG tablet, Take 1 tablet by mouth Daily., Disp: 30 tablet, Rfl: 1  •  anastrozole (ARIMIDEX) 1 MG tablet, Take 1 tablet by mouth Daily., Disp: 30 tablet, Rfl: 6  •  aspirin 81 MG chewable tablet, Chew 1 tablet Daily., Disp: 30 tablet, Rfl: 1  •  Blood Glucose Monitoring Suppl (Accu-Chek Araceli) device, Use as instructed   To test blood sugar bid, Disp: 1 each, Rfl: 0  •  Calcium Carbonate-Vit D-Min (Calcium 600+D Plus Minerals) 600-400 MG-UNIT chewable tablet, Chew 600 mg 2 (Two) Times a Day., Disp: 60 each, Rfl: 6  •  celecoxib (CeleBREX) 200 MG capsule, Take 1 capsule by mouth Daily., Disp: 30 capsule, Rfl: 1  •  cyclobenzaprine (FLEXERIL) 10 MG tablet, Take 1 tablet by mouth 2 (Two) Times a Day As Needed for Muscle Spasms., Disp: 30 tablet, Rfl: 0  •  glucose blood (Accu-Chek Araceli Plus) test strip, Use as instructed   To test bid, Disp: 200 each, Rfl: 3  •  insulin NPH-insulin regular (humuLIN 70/30,novoLIN 70/30) (70-30) 100 UNIT/ML injection, Inject 40 Units under the skin into the appropriate area as directed Every Morning., Disp: , Rfl:   •  insulin NPH-insulin regular (humuLIN 70/30,novoLIN 70/30) (70-30) 100 UNIT/ML injection, Inject 30 Units  under the skin into the appropriate area as directed Every Evening., Disp: , Rfl:   •  Lancets (accu-chek soft touch) lancets, Test bid, Disp: 200 each, Rfl: 3  •  lisinopril (PRINIVIL,ZESTRIL) 20 MG tablet, Take 20 mg by mouth Daily., Disp: , Rfl:   •  ondansetron (ZOFRAN) 8 MG tablet, Take 1 tablet by mouth 3 (Three) Times a Day As Needed for Nausea or Vomiting., Disp: 30 tablet, Rfl: 5  •  oxyCODONE-acetaminophen (PERCOCET) 5-325 MG per tablet, , Disp: , Rfl:   •  rosuvastatin (Crestor) 20 MG tablet, Take 1 tablet by mouth Every Night., Disp: 90 tablet, Rfl: 0    Allergies   Allergen Reactions   • Promethazine Mental Status Change   • Tape Other (See Comments)     .blisters         Family History   Problem Relation Age of Onset   • Heart disease Mother    • Stroke Mother    • Lung cancer Mother    • Aneurysm Father    • Diabetes Sister    • No Known Problems Brother    • No Known Problems Brother    • Diabetes type I Half-Sister    • Thyroid cancer Half-Sister    • Cancer Maternal Aunt    • Heart attack Sister    • Thyroid disease Sister    • No Known Problems Sister        Cancer-related family history includes Cancer in her maternal aunt; Lung cancer in her mother; Thyroid cancer in her half-sister.    Social History     Tobacco Use   • Smoking status: Never Smoker   • Smokeless tobacco: Never Used   Vaping Use   • Vaping Use: Never used   Substance Use Topics   • Alcohol use: Yes     Alcohol/week: 1.0 standard drink     Types: 1 Glasses of wine per week     Comment: rare   • Drug use: Not Currently       HPI, ROS and PFSH have been reviewed and confirmed on 11/11/2021.       SUBJECTIVE:    She complains of low back pain but no bowel or bladder issues. She has not been taking Percocet because it was not helping.            REVIEW OF SYSTEMS:    Review of Systems   Constitutional: Negative for chills and fever.   HENT: Negative for ear pain, mouth sores, nosebleeds and sore throat.    Eyes: Negative for  photophobia and visual disturbance.   Respiratory: Negative for wheezing and stridor.    Cardiovascular: Negative for chest pain and palpitations.   Gastrointestinal: Negative for abdominal pain, diarrhea, nausea and vomiting.   Endocrine: Negative for cold intolerance and heat intolerance.   Genitourinary: Negative for dysuria and hematuria.   Musculoskeletal: Negative for joint swelling and neck stiffness.   Skin: Negative for color change and rash.   Neurological: Negative for seizures and syncope.   Hematological: Negative for adenopathy.        No obvious bleeding   Psychiatric/Behavioral: Negative for agitation, confusion and hallucinations.     OBJECTIVE:    Vitals:    11/11/21 0842 11/11/21 0846   BP: (!) 155/122 144/82   Pulse: 86    Resp: 22    Temp: 97.1 °F (36.2 °C)    SpO2: 100%    Weight: 71.7 kg (158 lb)    PainSc:   8    PainLoc: Back      Body mass index is 29.85 kg/m².    ECOG    (1) Restricted in physically strenuous activity, ambulatory and able to do work of light nature    Physical Exam  Vitals and nursing note reviewed.   Constitutional:       General: She is not in acute distress.     Appearance: Normal appearance. She is not diaphoretic.   HENT:      Head: Normocephalic and atraumatic.      Mouth/Throat:      Mouth: Mucous membranes are moist.      Pharynx: Oropharynx is clear.   Eyes:      General: No scleral icterus.        Right eye: No discharge.         Left eye: No discharge.      Extraocular Movements: Extraocular movements intact.      Conjunctiva/sclera: Conjunctivae normal.   Neck:      Thyroid: No thyromegaly.   Cardiovascular:      Rate and Rhythm: Normal rate and regular rhythm.      Pulses: Normal pulses.      Heart sounds: Normal heart sounds. No friction rub. No gallop.    Pulmonary:      Effort: Pulmonary effort is normal. No respiratory distress.      Breath sounds: Normal breath sounds. No stridor. No wheezing.   Abdominal:      General: Bowel sounds are normal. There is  distension.      Palpations: Abdomen is soft. There is no mass.      Tenderness: There is abdominal tenderness. There is guarding (Left upper abdomen). There is no rebound.   Musculoskeletal:         General: No tenderness. Normal range of motion.      Cervical back: Normal range of motion and neck supple.      Right lower leg: No edema.      Left lower leg: No edema.      Comments: Neck pain.  Patient has a neck collar.   Lymphadenopathy:      Cervical: No cervical adenopathy.   Skin:     General: Skin is warm and dry.      Capillary Refill: Capillary refill takes less than 2 seconds.      Findings: No bruising, erythema or rash.   Neurological:      Mental Status: She is alert and oriented to person, place, and time.      Cranial Nerves: No cranial nerve deficit.      Sensory: No sensory deficit.      Motor: No abnormal muscle tone.   Psychiatric:         Mood and Affect: Mood normal.         Behavior: Behavior normal.         Thought Content: Thought content normal.         Judgment: Judgment normal.     I have reexamined the patient and the results are consistent with the previously documented exam. Мария Lian Nunez MD     RECENT LABS      WBC   Date Value Ref Range Status   11/11/2021 3.17 (L) 3.40 - 10.80 10*3/mm3 Final     RBC   Date Value Ref Range Status   11/11/2021 4.06 3.77 - 5.28 10*6/mm3 Final     Hemoglobin   Date Value Ref Range Status   11/11/2021 12.6 12.0 - 15.9 g/dL Final     Hematocrit   Date Value Ref Range Status   11/11/2021 37.4 34.0 - 46.6 % Final     MCV   Date Value Ref Range Status   11/11/2021 92.1 79.0 - 97.0 fL Final     MCH   Date Value Ref Range Status   11/11/2021 31.0 26.6 - 33.0 pg Final     MCHC   Date Value Ref Range Status   11/11/2021 33.7 31.5 - 35.7 g/dL Final     RDW   Date Value Ref Range Status   11/11/2021 15.8 (H) 12.3 - 15.4 % Final     RDW-SD   Date Value Ref Range Status   11/11/2021 50.5 37.0 - 54.0 fl Final     MPV   Date Value Ref Range Status   11/11/2021  10.7 6.0 - 12.0 fL Final     Platelets   Date Value Ref Range Status   11/11/2021 111 (L) 140 - 450 10*3/mm3 Final     Neutrophil %   Date Value Ref Range Status   11/11/2021 43.9 42.7 - 76.0 % Final     Lymphocyte %   Date Value Ref Range Status   11/11/2021 42.9 19.6 - 45.3 % Final     Monocyte %   Date Value Ref Range Status   11/11/2021 11.7 5.0 - 12.0 % Final     Eosinophil %   Date Value Ref Range Status   11/11/2021 0.6 0.3 - 6.2 % Final     Basophil %   Date Value Ref Range Status   11/11/2021 0.9 0.0 - 1.5 % Final     Immature Grans %   Date Value Ref Range Status   07/20/2020 0.7 (H) 0.0 - 0.5 % Final     Neutrophils, Absolute   Date Value Ref Range Status   11/11/2021 1.39 (L) 1.70 - 7.00 10*3/mm3 Final     Lymphocytes, Absolute   Date Value Ref Range Status   11/11/2021 1.36 0.70 - 3.10 10*3/mm3 Final     Monocytes, Absolute   Date Value Ref Range Status   11/11/2021 0.37 0.10 - 0.90 10*3/mm3 Final     Eosinophils, Absolute   Date Value Ref Range Status   11/11/2021 0.02 0.00 - 0.40 10*3/mm3 Final     Basophils, Absolute   Date Value Ref Range Status   11/11/2021 0.03 0.00 - 0.20 10*3/mm3 Final     Immature Grans, Absolute   Date Value Ref Range Status   07/20/2020 0.05 0.00 - 0.05 10*3/mm3 Final     nRBC   Date Value Ref Range Status   09/01/2021 0.4 (H) 0.0 - 0.2 /100 WBC Final       Lab Results   Component Value Date    GLUCOSE 213 (H) 10/18/2021    BUN 16 10/18/2021    CREATININE 0.78 10/18/2021    EGFRIFNONA 76 10/18/2021    EGFRIFAFRI >60 10/12/2018    BCR 20.5 10/18/2021    K 4.5 10/18/2021    CO2 23.5 10/18/2021    CALCIUM 9.6 10/18/2021    PROTENTOTREF 7.4 12/14/2020    ALBUMIN 4.70 10/18/2021    LABIL2 0.9 12/14/2020    AST 30 10/18/2021    ALT 24 10/18/2021           ASSESSMENT:    · Metastatic breast cancer presenting as 1.1 cm mixed lytic/sclerotic hypermetabolic lesion in the left hemisacrum suspicious for metastatic disease 1.2 cm sclerotic lesion on L4, small L3 lesion and T11 lesion.   Tumor is ER positive, IN positive and HER-2/bishop negative.  Patient is currently on aromatase inhibitor, I have therefore recommended addition of CDK 4 inhibitor with Ibrance.  She will also benefit from biphosphonate therapy to prevent skeletal fractures.  Will discontinue Prolia and begin Xgeva as well.  Reviewed the side effects of Ibrance in detail with patient to include but not limited to fatigue, hepatic function abnormalities, interstitial lung disease, pancytopenia.  She is currently on combination of Ibrance, Arimidex and (Xgeva to be started once her dental procedure has been completed).  Her scans and tumor markers had suggest that she is responding to treatment. I have reviewed restaging CT scan of the chest, abdomen and pelvis completed 11/3/2021. There is no clear evidence of progressive disease.  · Tooth ache probable secondary to infection.  Patient has appointment with dentist coming up.  She is currently on antibiotics.  She will let me know once her dental procedures are done.  Patient has not received Xgeva as she is still undergoing dental treatments.  She has been encouraged to let me know once completed so we can start her biphosphonate therapy: Reviewed patient is not yet ready also reviewed with her the importance of biphosphonate  · Pancytopenia secondary to Ibrance: CBC reviewed  · Ongoing back pain issues: We will order a bone scan as well as MRI of the lumbar and pelvis area. We will also give patient referral to pain management with Dr. Hunter. Will increase Percocet to 10 mg every 4-6 hours as needed for pain  · ypT2 N1 aM0 status post left modified radical mastectomy with lymph node dissection and right prophylactic mastectomy in 2017 performed at [facility].  ER positive, IN positive and HER-2/bishop negative.  Status post bilateral chest wall reconstruction.  According to patient, she tolerated Arimidex very well except that she also had osteoporosis therefore  Arimidex was discontinued at that time  · Intolerance of tamoxifen in the past  · Osteoporosis: She was on Prolia: We will transition to Xgeva since she has bone metastases  · Status post neoadjuvant Arimidex prior to bilateral mastectomies  · Thrombocytopenia: Work-up was - December 2020  · Neck pain status post cervical spine surgery: Resolved  · Complex cardiac medical history including tetralogy of Fallot, coarctation of aorta, VSD status post repair.  Status post stent placement for coarctation of aorta  · Personal history and strong family history of breast cancer in multiple in the relatives on paternal side of the family including 4 paternal aunts in their 30s and 40s and 2 maternal aunts at age of 20s and 50s.  There is concern for possible hereditary breast cancer syndrome.  Patient may be  interested in pursuing cancer genetics for her management.  · Assessment has been reviewed and updated          Discussion    Patient has metastatic breast cancer estrogen and progesterone dependent and HER-2/bishop negative.  She is currently on Arimidex.  We will add Ibrance.  We will also discontinue Prolia and begin Xgeva to help reduce skeletal events.           Plans:     · CT scan of the chest, abdomen and pelvis for cancer restaging reviewed  · Order bone scan, MRI of the lumbar and pelvis  · CMP today  · Increase pain medicines to Percocet 10 mg p.o. every 4 hours as needed for pain #40  · Referred to pain management with Dr. Hunter  · Refill Ibrance, continue Arimidex, calcium with vitamin D  · Follow-up with /counselor   · Follow-up with dentist as soon as possible  · Reviewed work-up for thrombocytopenia which was negative  · Reviewed her bone density which showed osteoporosis  · Hold Xgeva 120 mg subcu monthly Xgeva has not been started yet: dental work not completed yet  · All questions answered  · Follow-up in 4 weeks or earlier as needed for problems  · All questions  answered                     Patient verbalized understanding and is in agreement of the above plan.       I spent 45 total minutes, face-to-face, caring for Gladys today.  90% of this time involved counseling and/or coordination of care as documented within this note.

## 2021-11-11 ENCOUNTER — SPECIALTY PHARMACY (OUTPATIENT)
Dept: PHARMACY | Facility: HOSPITAL | Age: 61
End: 2021-11-11

## 2021-11-11 ENCOUNTER — OFFICE VISIT (OUTPATIENT)
Dept: ONCOLOGY | Facility: CLINIC | Age: 61
End: 2021-11-11

## 2021-11-11 ENCOUNTER — LAB (OUTPATIENT)
Dept: LAB | Facility: HOSPITAL | Age: 61
End: 2021-11-11

## 2021-11-11 VITALS
OXYGEN SATURATION: 100 % | SYSTOLIC BLOOD PRESSURE: 144 MMHG | HEART RATE: 86 BPM | TEMPERATURE: 97.1 F | RESPIRATION RATE: 22 BRPM | BODY MASS INDEX: 29.85 KG/M2 | DIASTOLIC BLOOD PRESSURE: 82 MMHG | WEIGHT: 158 LBS

## 2021-11-11 DIAGNOSIS — C50.919 MALIGNANT NEOPLASM OF FEMALE BREAST, UNSPECIFIED ESTROGEN RECEPTOR STATUS, UNSPECIFIED LATERALITY, UNSPECIFIED SITE OF BREAST (HCC): Primary | ICD-10-CM

## 2021-11-11 DIAGNOSIS — M89.8X9 BONE PAIN: ICD-10-CM

## 2021-11-11 DIAGNOSIS — C79.51 SPINE METASTASIS: ICD-10-CM

## 2021-11-11 DIAGNOSIS — C50.919 MALIGNANT NEOPLASM OF FEMALE BREAST, UNSPECIFIED ESTROGEN RECEPTOR STATUS, UNSPECIFIED LATERALITY, UNSPECIFIED SITE OF BREAST (HCC): ICD-10-CM

## 2021-11-11 LAB
ALBUMIN SERPL-MCNC: 4 G/DL (ref 3.5–5.2)
ALBUMIN/GLOB SERPL: 1.5 G/DL
ALP SERPL-CCNC: 74 U/L (ref 39–117)
ALT SERPL W P-5'-P-CCNC: 12 U/L (ref 1–33)
ANION GAP SERPL CALCULATED.3IONS-SCNC: 14 MMOL/L (ref 5–15)
AST SERPL-CCNC: 19 U/L (ref 1–32)
BASOPHILS # BLD AUTO: 0.03 10*3/MM3 (ref 0–0.2)
BASOPHILS NFR BLD AUTO: 0.9 % (ref 0–1.5)
BILIRUB SERPL-MCNC: 0.3 MG/DL (ref 0–1.2)
BUN SERPL-MCNC: 13 MG/DL (ref 6–20)
BUN/CREAT SERPL: 17.6 (ref 7–25)
CALCIUM SPEC-SCNC: 9 MG/DL (ref 8.6–10.5)
CHLORIDE SERPL-SCNC: 106 MMOL/L (ref 98–107)
CO2 SERPL-SCNC: 23 MMOL/L (ref 22–29)
CREAT SERPL-MCNC: 0.74 MG/DL (ref 0.57–1)
DEPRECATED RDW RBC AUTO: 50.5 FL (ref 37–54)
EOSINOPHIL # BLD AUTO: 0.02 10*3/MM3 (ref 0–0.4)
EOSINOPHIL NFR BLD AUTO: 0.6 % (ref 0.3–6.2)
ERYTHROCYTE [DISTWIDTH] IN BLOOD BY AUTOMATED COUNT: 15.8 % (ref 12.3–15.4)
GFR SERPL CREATININE-BSD FRML MDRD: 80 ML/MIN/1.73
GLOBULIN UR ELPH-MCNC: 2.7 GM/DL
GLUCOSE SERPL-MCNC: 76 MG/DL (ref 65–99)
HCT VFR BLD AUTO: 37.4 % (ref 34–46.6)
HGB BLD-MCNC: 12.6 G/DL (ref 12–15.9)
LYMPHOCYTES # BLD AUTO: 1.36 10*3/MM3 (ref 0.7–3.1)
LYMPHOCYTES NFR BLD AUTO: 42.9 % (ref 19.6–45.3)
MCH RBC QN AUTO: 31 PG (ref 26.6–33)
MCHC RBC AUTO-ENTMCNC: 33.7 G/DL (ref 31.5–35.7)
MCV RBC AUTO: 92.1 FL (ref 79–97)
MONOCYTES # BLD AUTO: 0.37 10*3/MM3 (ref 0.1–0.9)
MONOCYTES NFR BLD AUTO: 11.7 % (ref 5–12)
NEUTROPHILS NFR BLD AUTO: 1.39 10*3/MM3 (ref 1.7–7)
NEUTROPHILS NFR BLD AUTO: 43.9 % (ref 42.7–76)
PLATELET # BLD AUTO: 111 10*3/MM3 (ref 140–450)
PMV BLD AUTO: 10.7 FL (ref 6–12)
POTASSIUM SERPL-SCNC: 4 MMOL/L (ref 3.5–5.2)
PROT SERPL-MCNC: 6.7 G/DL (ref 6–8.5)
RBC # BLD AUTO: 4.06 10*6/MM3 (ref 3.77–5.28)
SODIUM SERPL-SCNC: 143 MMOL/L (ref 136–145)
WBC # BLD AUTO: 3.17 10*3/MM3 (ref 3.4–10.8)

## 2021-11-11 PROCEDURE — 99215 OFFICE O/P EST HI 40 MIN: CPT | Performed by: INTERNAL MEDICINE

## 2021-11-11 PROCEDURE — 36415 COLL VENOUS BLD VENIPUNCTURE: CPT

## 2021-11-11 PROCEDURE — 80053 COMPREHEN METABOLIC PANEL: CPT

## 2021-11-11 PROCEDURE — 85025 COMPLETE CBC W/AUTO DIFF WBC: CPT

## 2021-11-11 RX ORDER — OXYCODONE AND ACETAMINOPHEN 10; 325 MG/1; MG/1
1 TABLET ORAL TAKE AS DIRECTED
Qty: 40 TABLET | Refills: 0 | Status: SHIPPED | OUTPATIENT
Start: 2021-11-11 | End: 2021-12-15 | Stop reason: SDUPTHER

## 2021-11-12 ENCOUNTER — TELEPHONE (OUTPATIENT)
Dept: FAMILY MEDICINE CLINIC | Facility: CLINIC | Age: 61
End: 2021-11-12

## 2021-11-12 NOTE — TELEPHONE ENCOUNTER
Caller: Gladys Garrison    Relationship: Self    Best call back number: [phone]    What is the medical concern/diagnosis: CARDIOLOGIST     What specialty or service is being requested:     What is the provider, practice or medical service name:     What is the office location:     What is the office phone number: [phone]    Any additional details:

## 2021-11-15 ENCOUNTER — SPECIALTY PHARMACY (OUTPATIENT)
Dept: PHARMACY | Facility: HOSPITAL | Age: 61
End: 2021-11-15

## 2021-11-15 NOTE — PROGRESS NOTES
MTM Note: Ibrance        11/11/2021   WBC 3.40 - 10.80 10*3/mm3 3.17 (A)   Neutrophils Absolute 1.70 - 7.00 10*3/mm3 1.39 (A)   Hemoglobin 12.0 - 15.9 g/dL 12.6   Hematocrit 34.0 - 46.6 % 37.4   Platelets 140 - 450 10*3/mm3 111 (A)   Creatinine 0.57 - 1.00 mg/dL 0.74   eGFR Non African Am >60 mL/min/1.73 80   BUN 6 - 20 mg/dL 13   Sodium 136 - 145 mmol/L 143   Potassium 3.5 - 5.2 mmol/L 4.0   Glucose 65 - 99 mg/dL 76   Calcium 8.6 - 10.5 mg/dL 9.0   Albumin 3.50 - 5.20 g/dL 4.00   Total Protein 6.0 - 8.5 g/dL 6.7   AST (SGOT) 1 - 32 U/L 19   ALT (SGPT) 1 - 33 U/L 12   Alkaline Phosphatase 39 - 117 U/L 74   Total Bilirubin 0.0 - 1.2 mg/dL 0.3     Per MD dictation 11/11/21, continue Ibrance. Pharmacy will continue to follow.  Thanks,    Eda Her, PharmD

## 2021-11-19 ENCOUNTER — TELEPHONE (OUTPATIENT)
Dept: ONCOLOGY | Facility: CLINIC | Age: 61
End: 2021-11-19

## 2021-11-19 NOTE — TELEPHONE ENCOUNTER
Caller: Gladys Garrison    Relationship: Self    Best call back number: [phone]    Requested Prescriptions:   [facility]    Pharmacy where request should be sent:    [facility]     Additional details provided by patient: PT IS OUT    Does the patient have less than a 3 day supply:  [x] Yes  [] No    William Man Rep   11/19/21 09:35 EST

## 2021-11-20 DIAGNOSIS — C50.919 MALIGNANT NEOPLASM OF FEMALE BREAST, UNSPECIFIED ESTROGEN RECEPTOR STATUS, UNSPECIFIED LATERALITY, UNSPECIFIED SITE OF BREAST (HCC): Primary | ICD-10-CM

## 2021-11-22 ENCOUNTER — OFFICE VISIT (OUTPATIENT)
Dept: FAMILY MEDICINE CLINIC | Facility: CLINIC | Age: 61
End: 2021-11-22

## 2021-11-22 VITALS
OXYGEN SATURATION: 98 % | RESPIRATION RATE: 20 BRPM | HEART RATE: 77 BPM | BODY MASS INDEX: 29.83 KG/M2 | DIASTOLIC BLOOD PRESSURE: 104 MMHG | TEMPERATURE: 97.3 F | HEIGHT: 61 IN | WEIGHT: 158 LBS | SYSTOLIC BLOOD PRESSURE: 142 MMHG

## 2021-11-22 DIAGNOSIS — Z95.0 PACEMAKER: Primary | ICD-10-CM

## 2021-11-22 DIAGNOSIS — C50.919 MALIGNANT NEOPLASM OF FEMALE BREAST, UNSPECIFIED ESTROGEN RECEPTOR STATUS, UNSPECIFIED LATERALITY, UNSPECIFIED SITE OF BREAST (HCC): Primary | ICD-10-CM

## 2021-11-22 PROCEDURE — 99213 OFFICE O/P EST LOW 20 MIN: CPT | Performed by: HOSPITALIST

## 2021-11-22 RX ORDER — ANASTROZOLE 1 MG/1
1 TABLET ORAL DAILY
Qty: 30 TABLET | Refills: 6 | Status: SHIPPED | OUTPATIENT
Start: 2021-11-22 | End: 2022-11-14

## 2021-11-22 NOTE — PROGRESS NOTES
"Subjective   Gladys Garrison is a 61 y.o. female.     Subjective / HPI  Patient seen in for follow up. Pt has no new complaint. Diagnosed case of ca breast, follows with oncology service, pt wanted to have MRI back done for metastasis, need to have cardiology write off for the pacemaker. Her primary cardiologist in [place], wanted to establish one now here.    Review of Systems    Objective     BP (!) 142/104 (BP Location: Right arm, Patient Position: Sitting, Cuff Size: Adult)   Pulse 77   Temp 97.3 °F (36.3 °C) (Temporal)   Resp 20   Ht 154.9 cm (61\")   Wt 71.7 kg (158 lb)   LMP  (LMP Unknown)   SpO2 98%   BMI 29.85 kg/m²      Physical Exam  Vitals and nursing note reviewed.   Constitutional:       General: She is not in acute distress.     Appearance: Normal appearance. She is well-developed. She is not ill-appearing, toxic-appearing or diaphoretic.   HENT:      Head: Normocephalic and atraumatic.      Right Ear: Ear canal and external ear normal.      Left Ear: Ear canal and external ear normal.      Nose: Nose normal. No congestion or rhinorrhea.      Mouth/Throat:      Mouth: Mucous membranes are moist.      Pharynx: No oropharyngeal exudate.   Eyes:      General: No scleral icterus.        Right eye: No discharge.         Left eye: No discharge.      Extraocular Movements: Extraocular movements intact.      Conjunctiva/sclera: Conjunctivae normal.      Pupils: Pupils are equal, round, and reactive to light.   Neck:      Thyroid: No thyromegaly.      Vascular: No carotid bruit or JVD.      Trachea: No tracheal deviation.   Cardiovascular:      Rate and Rhythm: Normal rate and regular rhythm.      Pulses: Normal pulses.      Heart sounds: Normal heart sounds. No murmur heard.  No friction rub. No gallop.    Pulmonary:      Effort: Pulmonary effort is normal. No respiratory distress.      Breath sounds: Normal breath sounds. No stridor. No wheezing, rhonchi or rales.   Chest:      Chest wall: No " tenderness.   Abdominal:      General: Bowel sounds are normal. There is no distension.      Palpations: Abdomen is soft. There is no mass.      Tenderness: There is no abdominal tenderness. There is no guarding or rebound.      Hernia: No hernia is present.   Musculoskeletal:         General: No swelling, tenderness, deformity or signs of injury. Normal range of motion.      Cervical back: Normal range of motion and neck supple. No rigidity. No muscular tenderness.      Right lower leg: No edema.      Left lower leg: No edema.   Lymphadenopathy:      Cervical: No cervical adenopathy.   Skin:     General: Skin is warm and dry.      Coloration: Skin is not jaundiced or pale.      Findings: No bruising, erythema or rash.   Neurological:      General: No focal deficit present.      Mental Status: She is alert and oriented to person, place, and time. Mental status is at baseline.      Cranial Nerves: No cranial nerve deficit.      Sensory: No sensory deficit.      Motor: No weakness or abnormal muscle tone.      Coordination: Coordination normal.   Psychiatric:         Mood and Affect: Mood normal.         Behavior: Behavior normal.         Thought Content: Thought content normal.         Judgment: Judgment normal.         Procedures       Assessment/Plan   Diagnoses and all orders for this visit:    1. Pacemaker (Primary)  -     Ambulatory Referral to Cardiology

## 2021-11-29 ENCOUNTER — APPOINTMENT (OUTPATIENT)
Dept: PET IMAGING | Facility: HOSPITAL | Age: 61
End: 2021-11-29

## 2021-11-29 ENCOUNTER — TELEPHONE (OUTPATIENT)
Dept: ONCOLOGY | Facility: CLINIC | Age: 61
End: 2021-11-29

## 2021-11-29 NOTE — TELEPHONE ENCOUNTER
SPOKE WITH KATHRYN [facility] MRI.  SHE STATES PATIENT HAS [employer] PACEMAKER AND PAPERWORK HAS TO BE FILLED OUT BY CARDIOLOGIST.  PER KATHRYN, PATIENT HAS CARDIOLOGIST IN [place] AT [facility] THAT CAN FILL PAPERWORK OUT, HOWEVER, PATIENT HAS TO HAVE MRI AT [facility].  PATIENT DOES NOT WANT TO DRIVE TO [place] FOR MRI.  KATHRYN STATES SHE HAS INFORMED PATIENT SEVERAL TIMES SHE WILL NEED TO GET LOCAL CARDIOLOGIST PRIOR TO HAVING MRI PERFORMED.  ATTEMPTED TO CONTACT PATIENT.  NO ANSWER.    LMV ASKING HER TO CALL BACK.

## 2021-11-30 NOTE — TELEPHONE ENCOUNTER
SPOKE WITH PATIENT.  SHE IS SCHEDULED TO SEE DR. ANGUIANO ON 12-20-21.  ELVIN HANCOCK RN NOTIFIED AND V/U.

## 2021-12-03 ENCOUNTER — TELEPHONE (OUTPATIENT)
Dept: ONCOLOGY | Facility: CLINIC | Age: 61
End: 2021-12-03

## 2021-12-03 NOTE — TELEPHONE ENCOUNTER
Caller: LUCIEN SARGNET    Relationship: SELF    Best call back number: [phone]    What is the best time to reach you: ANY    What was the call regarding: PT WAS SEEN ON 11/11/21 AND WAS BROUGHT TO HER APPT BY HER SON. PT FORGOT TO GET A NOTE FOR HE SONS EMPLOYER STATING HE BROUGHT HER, AND ASKS IF ONE COULD BE MAILED TO HER?    Do you require a callback: YES

## 2021-12-08 ENCOUNTER — DOCUMENTATION (OUTPATIENT)
Dept: PHYSICAL THERAPY | Facility: CLINIC | Age: 61
End: 2021-12-08

## 2021-12-08 NOTE — PROGRESS NOTES
Discharge Summary  Discharge Summary from Physical/Occupational Therapy Report    Patient: Gladys Garrison   : 1960  Today's Date: 2021    Patient seen for 3 visits.  Dates of Service: 20 - 20     Discharge Status of Patient: Patient only seen for 3 visits, then cancelled appointment due to her brother having COVID, then never called back to reschedule.    Goals: Not Met    Discharge Plan: Patient to return to referring/providing physician    Thank you for this referral to [facility].    SIGNATURE: Beth Schmidt, PT

## 2021-12-13 ENCOUNTER — HOSPITAL ENCOUNTER (OUTPATIENT)
Dept: NUCLEAR MEDICINE | Facility: HOSPITAL | Age: 61
Discharge: HOME | End: 2021-12-13

## 2021-12-13 ENCOUNTER — APPOINTMENT (OUTPATIENT)
Dept: MRI IMAGING | Facility: HOSPITAL | Age: 61
End: 2021-12-13

## 2021-12-13 DIAGNOSIS — C79.51 SPINE METASTASIS: ICD-10-CM

## 2021-12-13 DIAGNOSIS — C50.919 MALIGNANT NEOPLASM OF FEMALE BREAST, UNSPECIFIED ESTROGEN RECEPTOR STATUS, UNSPECIFIED LATERALITY, UNSPECIFIED SITE OF BREAST (HCC): ICD-10-CM

## 2021-12-13 DIAGNOSIS — M89.8X9 BONE PAIN: ICD-10-CM

## 2021-12-13 PROCEDURE — A9503 TC99M MEDRONATE: HCPCS | Performed by: INTERNAL MEDICINE

## 2021-12-13 PROCEDURE — 78306 BONE IMAGING WHOLE BODY: CPT

## 2021-12-13 PROCEDURE — 0 TECHNETIUM MEDRONATE KIT: Performed by: INTERNAL MEDICINE

## 2021-12-13 RX ORDER — TC 99M MEDRONATE 20 MG/10ML
27.5 INJECTION, POWDER, LYOPHILIZED, FOR SOLUTION INTRAVENOUS
Status: COMPLETED | OUTPATIENT
Start: 2021-12-13 | End: 2021-12-13

## 2021-12-13 RX ADMIN — TC 99M MEDRONATE 27.5 MILLICURIE: 20 INJECTION, POWDER, LYOPHILIZED, FOR SOLUTION INTRAVENOUS at 10:07

## 2021-12-15 ENCOUNTER — OFFICE VISIT (OUTPATIENT)
Dept: PAIN MEDICINE | Facility: CLINIC | Age: 61
End: 2021-12-15

## 2021-12-15 VITALS
HEART RATE: 74 BPM | HEIGHT: 61 IN | RESPIRATION RATE: 16 BRPM | OXYGEN SATURATION: 98 % | WEIGHT: 158 LBS | BODY MASS INDEX: 29.83 KG/M2 | SYSTOLIC BLOOD PRESSURE: 161 MMHG | DIASTOLIC BLOOD PRESSURE: 79 MMHG

## 2021-12-15 DIAGNOSIS — C79.51 SPINE METASTASIS: ICD-10-CM

## 2021-12-15 DIAGNOSIS — C50.919 MALIGNANT NEOPLASM OF FEMALE BREAST, UNSPECIFIED ESTROGEN RECEPTOR STATUS, UNSPECIFIED LATERALITY, UNSPECIFIED SITE OF BREAST (HCC): ICD-10-CM

## 2021-12-15 DIAGNOSIS — G89.3 CANCER ASSOCIATED PAIN: Primary | ICD-10-CM

## 2021-12-15 PROCEDURE — 99204 OFFICE O/P NEW MOD 45 MIN: CPT | Performed by: STUDENT IN AN ORGANIZED HEALTH CARE EDUCATION/TRAINING PROGRAM

## 2021-12-15 RX ORDER — OXYCODONE AND ACETAMINOPHEN 10; 325 MG/1; MG/1
1 TABLET ORAL DAILY PRN
Qty: 20 TABLET | Refills: 0 | Status: SHIPPED | OUTPATIENT
Start: 2021-12-15 | End: 2022-02-24 | Stop reason: SDUPTHER

## 2021-12-16 NOTE — PROGRESS NOTES
CHIEF COMPLAINT  Chief Complaint   Patient presents with   • Cancer Pain     BREAST CANCER GOING INTO BONES. OXYCODONE LAST DOSE 1/2 TABLET 12/14/21 PM.        Primary Care  Angelica Rodriguez MD    Subjective   Gladys Garrison is a 61 y.o. female  who presents for cancer-related pain.  She has a significant history of breast cancer which is unfortunately metastatic.  She states she has lesions on her ribs as well as in her low back.  She describes pain in her low back which radiates in her legs as well as pain in her chest and side from reported rib fractures.  She is very hesitant to take any narcotic pain medication because she does not want to become dependent on medication.  She also has significant concerns regarding escalating pain requirements and inability to meet the requirements that she starts on narcotic pain medication.  She states in the past, she had excellent results with steroid injections for her shoulder knee pain.  She was referred by her oncologist for further management and nonnarcotic options.    History of Present Illness     Location: Neck, back, ribs  Onset: Years ago  Duration: Gradually worsening  Timing: Throughout the day  Quality: Stabbing, aching  Severity: Today: 8       Last Week: 8       Worst: 9  Modifying Factors: The pain is fairly constant without any significant exacerbating or relieving factors    Physical Therapy: no    Interval Update 12/15/2021:     The following portions of the patient's history were reviewed and updated as appropriate: allergies, current medications, past family history, past medical history, past social history, past surgical history and problem list.      Current Outpatient Medications:   •  amLODIPine (Norvasc) 5 MG tablet, Take 1 tablet by mouth Daily., Disp: 30 tablet, Rfl: 1  •  anastrozole (ARIMIDEX) 1 MG tablet, Take 1 tablet by mouth Daily., Disp: 30 tablet, Rfl: 6  •  anastrozole (ARIMIDEX) 1 MG tablet, Take 1 tablet by mouth Daily., Disp:  30 tablet, Rfl: 6  •  aspirin 81 MG chewable tablet, Chew 1 tablet Daily., Disp: 30 tablet, Rfl: 1  •  Blood Glucose Monitoring Suppl (Accu-Chek Araceli) device, Use as instructed   To test blood sugar bid, Disp: 1 each, Rfl: 0  •  Calcium Carbonate-Vit D-Min (Calcium 600+D Plus Minerals) 600-400 MG-UNIT chewable tablet, Chew 600 mg 2 (Two) Times a Day., Disp: 60 each, Rfl: 6  •  celecoxib (CeleBREX) 200 MG capsule, Take 1 capsule by mouth Daily., Disp: 30 capsule, Rfl: 1  •  cyclobenzaprine (FLEXERIL) 10 MG tablet, Take 1 tablet by mouth 2 (Two) Times a Day As Needed for Muscle Spasms., Disp: 30 tablet, Rfl: 0  •  glucose blood (Accu-Chek Araceli Plus) test strip, Use as instructed   To test bid, Disp: 200 each, Rfl: 3  •  insulin NPH-insulin regular (humuLIN 70/30,novoLIN 70/30) (70-30) 100 UNIT/ML injection, Inject 40 Units under the skin into the appropriate area as directed Every Morning., Disp: , Rfl:   •  insulin NPH-insulin regular (humuLIN 70/30,novoLIN 70/30) (70-30) 100 UNIT/ML injection, Inject 30 Units under the skin into the appropriate area as directed Every Evening., Disp: , Rfl:   •  Lancets (accu-chek soft touch) lancets, Test bid, Disp: 200 each, Rfl: 3  •  lisinopril (PRINIVIL,ZESTRIL) 20 MG tablet, Take 20 mg by mouth Daily., Disp: , Rfl:   •  ondansetron (ZOFRAN) 8 MG tablet, Take 1 tablet by mouth 3 (Three) Times a Day As Needed for Nausea or Vomiting., Disp: 30 tablet, Rfl: 5  •  oxyCODONE-acetaminophen (PERCOCET)  MG per tablet, Take 1 tablet by mouth Daily As Needed for Moderate Pain  or Severe Pain . 1 tablet every 4-6 hours prn pain, Disp: 20 tablet, Rfl: 0  •  rosuvastatin (Crestor) 20 MG tablet, Take 1 tablet by mouth Every Night., Disp: 90 tablet, Rfl: 0    Review of Systems   Cardiovascular: Positive for chest pain.   Genitourinary: Positive for flank pain.   Musculoskeletal: Positive for arthralgias, back pain, gait problem, myalgias and neck pain.       Vitals:    12/15/21 1457  "  BP: 161/79   Pulse: 74   Resp: 16   SpO2: 98%   Weight: 71.7 kg (158 lb)   Height: 154.9 cm (61\")   PainSc:   8       Objective   Physical Exam  Vitals and nursing note reviewed.   Constitutional:       General: She is not in acute distress.     Appearance: Normal appearance. She is obese.   Musculoskeletal:         General: Normal range of motion.   Neurological:      General: No focal deficit present.      Mental Status: She is alert.      Sensory: No sensory deficit.      Motor: No weakness.           Assessment/Plan   Problems Addressed this Visit        Other    Malignant tumor of breast (HCC)    Relevant Medications    oxyCODONE-acetaminophen (PERCOCET)  MG per tablet      Other Visit Diagnoses     Cancer associated pain    -  Primary    Relevant Orders    Ambulatory Referral to Psychology (Completed)    Spine metastasis (HCC)        Relevant Medications    oxyCODONE-acetaminophen (PERCOCET)  MG per tablet      Diagnoses       Codes Comments    Cancer associated pain    -  Primary ICD-10-CM: G89.3  ICD-9-CM: 338.3     Malignant neoplasm of female breast, unspecified estrogen receptor status, unspecified laterality, unspecified site of breast (HCC)     ICD-10-CM: C50.919  ICD-9-CM: 174.9     Spine metastasis (HCC)     ICD-10-CM: C79.51  ICD-9-CM: 198.5           Plan:  1. She previously did get some relief with occasional oxycodone 10 mg.  She does not want to remain on this medication.  2. We had a long discussion regarding her overall disease progression as well as her prognosis.  Given her desire to remain off of systemic narcotics if possible despite having overall widespread cancer pain, I feel that she would be an excellent candidate for intrathecal pain pump therapy.  3. We discussed that with the intrathecal pain pump, she will be able to control her own dosing without having a basal rate if she wishes  4. She is agreement of this plan and I will order psychological evaluation and if " improved, will plan for implantation of the intrathecal pain pump  5. I explained that we may have to undergo trial.  However given that she does have active cancer I will attempt to implant without formal trialing.  6. Plan for microdosing of morphine to the intrathecal pain pump with a PTM only  7. We will refill oxycodone for occasional breakthrough pain  --- Follow-up next available for implantation of intrathecal pain pump           INSPECT REPORT    As part of the patient's treatment plan, I may be prescribing controlled substances. The patient has been made aware of appropriate use of such medications, including potential risk of somnolence, limited ability to drive and/or work safely, and the potential for dependence or overdose. It has also bee made clear that these medications are for use by this patient only, without concomitant use of alcohol or other substances unless prescribed.     Patient has completed prescribing agreement detailing terms of continued prescribing of controlled substances, including monitoring JULIA reports, urine drug screening, and pill counts if necessary. The patient is aware that inappropriate use will results in cessation of prescribing such medications.    INSPECT report has been reviewed and scanned into the patient's chart.    As the clinician, I personally reviewed the INSPECT from 12/14/2021.    History and physical exam exhibit continued safe and appropriate use of controlled substances.      EMR Dragon/Transcription disclaimer:   Much of this encounter note is an electronic transcription/translation of spoken language to printed text. The electronic translation of spoken language may permit erroneous, or at times, nonsensical words or phrases to be inadvertently transcribed; Although I have reviewed the note for such errors, some may still exist.

## 2021-12-17 NOTE — PROGRESS NOTES
Hematology/Oncology Outpatient Follow Up    PATIENT NAME:Gladys Garrison  :1960  MRN: [id number]  PRIMARY CARE PHYSICIAN: Angelica Rodriguez MD  REFERRING PHYSICIAN: Angelica Rodriguez MD    Chief Complaint   Patient presents with   • Follow-up     Malignant neoplasm of female breast        HISTORY OF PRESENT ILLNESS:     This is a 60-year-old female who multiple comorbidities including congenital abnormalities such as hypoplastic kidney, tetralogy of Fallot, coarctation of the aorta and congenital VSD status post repair.  Patient developed syncopal episode and for that reason she had she had a CT scan of the chest which showed mass in the left breast.  She had diagnostic mammogram and ultrasound which showed a 2 cm spiculated mass in the left breast at the 1 o'clock position 6 cm from the nipple.  Biopsy of the left breast mass revealed invasive moderately differentiated carcinoma ER/AZ positive and HER-2/bishop negative.  Patient also had an ultrasound of the axilla which was concerning for an abnormal left axillary lymph node with cortical thickening. She apparently had an FNA of the left axillary nodule which was positive for malignancy.  On 2017 patient underwent left modified radical mastectomy, right prophylactic mastectomy and immediate breast reconstruction. She also underwent right prophylactic mastectomy.  We have had her on records suggest that patient did have multifocal disease with pT2 pN1 aM0.  The largest focus measured 3.5 cm.  2 of 11 lymph nodes were positive for metastatic disease some with extracapsular extension.  Notes that patient did receive Arimidex neoadjuvant  from 2017 to 2018 prior to her bilateral mastectomies.    Review of her note suggest that she developed cough which she attributed to anastrozole and patient was then placed on tamoxifen.  She had MammaPrint testing which returned with low risk for relapse.    Her postop course was also complicated by  left chest wall abscess which resulted in I&D and removal of the left tissue expander. She was referred for radiation treatment boards ultimately declined.    Patient was then placed on tamoxifen in April 2018 which she stopped after less than a month due to nausea and vomiting.  Patient in the interim also was diagnosed with chronic hepatitis C was seen by the hepatologist.  Tamoxifen was dose reduced to 10 mg daily with the goal to increase to 20 mg daily.      Patient has relocated to [facility].  She has transitioned her care to us now.  She is currently not on any antiestrogen therapy.     Her bilateral mastectomy specimen are available for review    · 1/29/2021 patient had bone density which showed osteoporosis  · Patient was seen by neurosurgery and had a bone scan done in March 2021 which showed subtle activity in the inferior L4 vertebral body appears to correlate with new area of sclerosis on CT of the abdomen and pelvis.  There is also subtle activity with possible new lesion at the posterior left sacrum.  These findings were concerning for metastatic disease.  PET CT scan was recommended to further evaluate.  · 4/8/2021 patient had a PET CT scan which showed evidence of disease in the neck chest abdomen and pelvis.  But she has a 1.1 cm mixed lytic/sclerotic hypermetabolic lesion within the left hemisacrum consistent with metastatic disease.  There is also accumulation within the inferior endplate of the L4 vertebral body thought to correspond to 1.2 centimeters sclerotic lesion consistent with metastatic disease.  There is a tiny hypermetabolic focus within the L3, T11.  Suspicious for also early metastatic disease.  · 4/26/2021 patient underwent CT-guided biopsy of sacral lesion, pathology was consistent with metastatic breast cancer ER and AK positive HER-2/bishop was negative.  · 7/6/21 CT new sclerosis within the right 12th rib posteriorly which could represent metastatic lesion or a healed or  healing fracture, new sclerosis within the right 12th rib posteriorly which         could represent metastatic lesion,new sclerosis within the right T8 transverse process worrisome for metastatic        disease progression.  · 7/7/21 complaints of 8/10 back pain and 8/10 LUQ pain. Referral to radiation for questionable mets on CT chest.  · 7/26/2021 patient had CT scan of the head with contrast with did not show any evidence of metastatic disease.  CT scan of the chest abdomen and pelvis did not show any evidence of progressive disease.  · 7/26/2021 patient had CEA level which shows declining values CA 15-3 has decreased to 62 from 84  · 11/3/2021: Patient had CT scan of the chest, abdomen and pelvis. In the chest there were no suspicious pulmonary nodules. There is no clear evidence of progressive disease  · 12/13/2021 patient had a bone scan which showed uptake along the posterior right ribs consistent with rib fractures.  There is mild uptake in the L4 vertebral body corresponding to the sclerotic lesion seen on previous CT scan.  This was likely due to metastatic disease    History of present illness was reviewed and is unchanged from the previous visit. 07/07/21      Past Medical History:   Diagnosis Date   • Allergic    • Bone cancer (HCC)    • Breast cancer (HCC) 2017    mets to lymph nodes; did not do radiation   • Cancer of unknown origin (HCC)    • Compression fx, thoracic spine, open, initial encounter (HCC)    • Coronary artery disease    • COVID-19 09/01/2021   • Diabetes mellitus (HCC)    • Heart disease, unspecified    • Hepatitis C    • Hypertension    • Obesity (BMI 30-39.9) 2/5/2021   • Sleep apnea     no machine   • Type 2 diabetes mellitus (HCC) 11/2017       Past Surgical History:   Procedure Laterality Date   • BACK SURGERY      neck   • BREAST RECONSTRUCTION Bilateral    • CARDIAC ABLATION       atrial tachycardia x 5 ablations    • CARDIAC CATHETERIZATION     • CARDIAC SURGERY      stent  placed in aorta   • CARDIAC SURGERY      6 surgeries as baby    • CERVICAL FUSION ANTERIOR WITH ARTIFICIAL DISCECTOMY IMPLANTATION N/A 2020    Procedure: C4 VERTEBRECTOMY AND ANTERIOR CERIVCAL DISCECTOMY WITH FUSION OF CERVICAL THREE THROUGH FIVE WITH REMOVAL OF HARDWARE C5-C6;  Surgeon: Mark Kaba MD;  Location: [facility] MAIN OR;  Service: Neurosurgery;  Laterality: N/A;   •  SECTION      x2   • COLONOSCOPY N/A 10/23/2020    Procedure: COLONOSCOPY WITH POLYPECTOMY X6;  Surgeon: Stanley Bourne MD;  Location: [facility] ENDOSCOPY;  Service: Gastroenterology;  Laterality: N/A;  POLYPS, INTERNAL HEMORRHOIDS   • ENDOSCOPY N/A 10/23/2020    Procedure: ESOPHAGOGASTRODUODENOSCOPY WITH BIOPSY X 1 AREA;  Surgeon: Stanley Bourne MD;  Location: [facility] ENDOSCOPY;  Service: Gastroenterology;  Laterality: N/A;  GASTRITIS, ESOPHAGITIS, HIATAL HERNIA   • KNEE SURGERY     • MASTECTOMY Bilateral    • NECK SURGERY     • PACEMAKER IMPLANTATION           Current Outpatient Medications:   •  amLODIPine (Norvasc) 5 MG tablet, Take 1 tablet by mouth Daily., Disp: 30 tablet, Rfl: 1  •  anastrozole (ARIMIDEX) 1 MG tablet, Take 1 tablet by mouth Daily., Disp: 30 tablet, Rfl: 6  •  anastrozole (ARIMIDEX) 1 MG tablet, Take 1 tablet by mouth Daily., Disp: 30 tablet, Rfl: 6  •  aspirin 81 MG chewable tablet, Chew 1 tablet Daily., Disp: 30 tablet, Rfl: 1  •  Blood Glucose Monitoring Suppl (Accu-Chek Araceli) device, Use as instructed   To test blood sugar bid, Disp: 1 each, Rfl: 0  •  Calcium Carbonate-Vit D-Min (Calcium 600+D Plus Minerals) 600-400 MG-UNIT chewable tablet, Chew 600 mg 2 (Two) Times a Day., Disp: 60 each, Rfl: 6  •  celecoxib (CeleBREX) 200 MG capsule, Take 1 capsule by mouth Daily., Disp: 30 capsule, Rfl: 1  •  cyclobenzaprine (FLEXERIL) 10 MG tablet, Take 1 tablet by mouth 2 (Two) Times a Day As Needed for Muscle Spasms., Disp: 30 tablet, Rfl: 0  •  glucose blood (Accu-Chek Araceli Plus)  test strip, Use as instructed   To test bid, Disp: 200 each, Rfl: 3  •  insulin NPH-insulin regular (humuLIN 70/30,novoLIN 70/30) (70-30) 100 UNIT/ML injection, Inject 40 Units under the skin into the appropriate area as directed Every Morning., Disp: , Rfl:   •  insulin NPH-insulin regular (humuLIN 70/30,novoLIN 70/30) (70-30) 100 UNIT/ML injection, Inject 30 Units under the skin into the appropriate area as directed Every Evening., Disp: , Rfl:   •  Lancets (accu-chek soft touch) lancets, Test bid, Disp: 200 each, Rfl: 3  •  lisinopril (PRINIVIL,ZESTRIL) 20 MG tablet, Take 20 mg by mouth Daily., Disp: , Rfl:   •  ondansetron (ZOFRAN) 8 MG tablet, Take 1 tablet by mouth 3 (Three) Times a Day As Needed for Nausea or Vomiting., Disp: 30 tablet, Rfl: 5  •  oxyCODONE-acetaminophen (PERCOCET)  MG per tablet, Take 1 tablet by mouth Daily As Needed for Moderate Pain  or Severe Pain . 1 tablet every 4-6 hours prn pain, Disp: 20 tablet, Rfl: 0  •  Palbociclib (Ibrance) 125 MG capsule capsule, Take 1 capsule by mouth Daily. Take on days 1-21 with food and then off for 7 days., Disp: 21 capsule, Rfl: 6  •  rosuvastatin (Crestor) 20 MG tablet, Take 1 tablet by mouth Every Night., Disp: 90 tablet, Rfl: 0    Allergies   Allergen Reactions   • Promethazine Mental Status Change   • Tape Other (See Comments)     .blisters         Family History   Problem Relation Age of Onset   • Heart disease Mother    • Stroke Mother    • Lung cancer Mother    • Aneurysm Father    • Diabetes Sister    • No Known Problems Brother    • No Known Problems Brother    • Diabetes type I Half-Sister    • Thyroid cancer Half-Sister    • Cancer Maternal Aunt    • Heart attack Sister    • Thyroid disease Sister    • No Known Problems Sister        Cancer-related family history includes Cancer in her maternal aunt; Lung cancer in her mother; Thyroid cancer in her half-sister.    Social History     Tobacco Use   • Smoking status: Never Smoker   •  "Smokeless tobacco: Never Used   Vaping Use   • Vaping Use: Never used   Substance Use Topics   • Alcohol use: Yes     Alcohol/week: 1.0 standard drink     Types: 1 Glasses of wine per week     Comment: rare   • Drug use: Not Currently       HPI, ROS and PFSH have been reviewed and confirmed on 12/20/2021.       SUBJECTIVE:    She complains of low back pain but no bowel or bladder issues. She has not been taking Percocet because it was not helping.      She is here today for routine follow-up and does not have any specific complaints          REVIEW OF SYSTEMS:    Review of Systems   Constitutional: Negative for chills and fever.   HENT: Negative for ear pain, mouth sores, nosebleeds and sore throat.    Eyes: Negative for photophobia and visual disturbance.   Respiratory: Negative for wheezing and stridor.    Cardiovascular: Negative for chest pain and palpitations.   Gastrointestinal: Negative for abdominal pain, diarrhea, nausea and vomiting.   Endocrine: Negative for cold intolerance and heat intolerance.   Genitourinary: Negative for dysuria and hematuria.   Musculoskeletal: Negative for joint swelling and neck stiffness.   Skin: Negative for color change and rash.   Neurological: Negative for seizures and syncope.   Hematological: Negative for adenopathy.        No obvious bleeding   Psychiatric/Behavioral: Negative for agitation, confusion and hallucinations.     OBJECTIVE:    Vitals:    12/20/21 1134   BP: (!) 190/95  Comment: pt upset at moment   Pulse: 74   Resp: 20   Height: 154.9 cm (61\")     Body mass index is 29.85 kg/m².    ECOG    (1) Restricted in physically strenuous activity, ambulatory and able to do work of light nature    Physical Exam  Vitals and nursing note reviewed.   Constitutional:       General: She is not in acute distress.     Appearance: Normal appearance. She is not diaphoretic.   HENT:      Head: Normocephalic and atraumatic.      Mouth/Throat:      Mouth: Mucous membranes are moist. "      Pharynx: Oropharynx is clear.   Eyes:      General: No scleral icterus.        Right eye: No discharge.         Left eye: No discharge.      Extraocular Movements: Extraocular movements intact.      Conjunctiva/sclera: Conjunctivae normal.   Neck:      Thyroid: No thyromegaly.   Cardiovascular:      Rate and Rhythm: Normal rate and regular rhythm.      Pulses: Normal pulses.      Heart sounds: Normal heart sounds. No friction rub. No gallop.    Pulmonary:      Effort: Pulmonary effort is normal. No respiratory distress.      Breath sounds: Normal breath sounds. No stridor. No wheezing.   Abdominal:      General: Bowel sounds are normal. There is distension.      Palpations: Abdomen is soft. There is no mass.      Tenderness: There is abdominal tenderness. There is guarding (Left upper abdomen). There is no rebound.   Musculoskeletal:         General: No tenderness. Normal range of motion.      Cervical back: Normal range of motion and neck supple.      Right lower leg: No edema.      Left lower leg: No edema.      Comments: Neck pain.  Patient has a neck collar.   Lymphadenopathy:      Cervical: No cervical adenopathy.   Skin:     General: Skin is warm and dry.      Capillary Refill: Capillary refill takes less than 2 seconds.      Findings: No bruising, erythema or rash.   Neurological:      Mental Status: She is alert and oriented to person, place, and time.      Cranial Nerves: No cranial nerve deficit.      Sensory: No sensory deficit.      Motor: No abnormal muscle tone.   Psychiatric:         Mood and Affect: Mood normal.         Behavior: Behavior normal.         Thought Content: Thought content normal.         Judgment: Judgment normal.       I have reexamined the patient and the results are consistent with the previously documented exam. Марияmiles Nunez MD     RECENT LABS    WBC   Date Value Ref Range Status   12/20/2021 6.08 3.40 - 10.80 10*3/mm3 Final     RBC   Date Value Ref Range Status    12/20/2021 4.37 3.77 - 5.28 10*6/mm3 Final     Hemoglobin   Date Value Ref Range Status   12/20/2021 13.1 12.0 - 15.9 g/dL Final     Hematocrit   Date Value Ref Range Status   12/20/2021 40.2 34.0 - 46.6 % Final     MCV   Date Value Ref Range Status   12/20/2021 92.0 79.0 - 97.0 fL Final     MCH   Date Value Ref Range Status   12/20/2021 30.0 26.6 - 33.0 pg Final     MCHC   Date Value Ref Range Status   12/20/2021 32.6 31.5 - 35.7 g/dL Final     RDW   Date Value Ref Range Status   12/20/2021 13.3 12.3 - 15.4 % Final     RDW-SD   Date Value Ref Range Status   12/20/2021 43.6 37.0 - 54.0 fl Final     MPV   Date Value Ref Range Status   12/20/2021 10.0 6.0 - 12.0 fL Final     Platelets   Date Value Ref Range Status   12/20/2021 91 (L) 140 - 450 10*3/mm3 Final     Neutrophil %   Date Value Ref Range Status   12/20/2021 73.3 42.7 - 76.0 % Final     Lymphocyte %   Date Value Ref Range Status   12/20/2021 16.9 (L) 19.6 - 45.3 % Final     Monocyte %   Date Value Ref Range Status   12/20/2021 7.2 5.0 - 12.0 % Final     Eosinophil %   Date Value Ref Range Status   12/20/2021 2.3 0.3 - 6.2 % Final     Basophil %   Date Value Ref Range Status   12/20/2021 0.3 0.0 - 1.5 % Final     Immature Grans %   Date Value Ref Range Status   07/20/2020 0.7 (H) 0.0 - 0.5 % Final     Neutrophils, Absolute   Date Value Ref Range Status   12/20/2021 4.45 1.70 - 7.00 10*3/mm3 Final     Lymphocytes, Absolute   Date Value Ref Range Status   12/20/2021 1.03 0.70 - 3.10 10*3/mm3 Final     Monocytes, Absolute   Date Value Ref Range Status   12/20/2021 0.44 0.10 - 0.90 10*3/mm3 Final     Eosinophils, Absolute   Date Value Ref Range Status   12/20/2021 0.14 0.00 - 0.40 10*3/mm3 Final     Basophils, Absolute   Date Value Ref Range Status   12/20/2021 0.02 0.00 - 0.20 10*3/mm3 Final     Immature Grans, Absolute   Date Value Ref Range Status   07/20/2020 0.05 0.00 - 0.05 10*3/mm3 Final     nRBC   Date Value Ref Range Status   09/01/2021 0.4 (H) 0.0 -  0.2 /100 WBC Final       Lab Results   Component Value Date    GLUCOSE 238 (H) 12/20/2021    BUN 18 12/20/2021    CREATININE 0.83 12/20/2021    EGFRIFNONA 70 12/20/2021    EGFRIFAFRI >60 10/12/2018    BCR 21.7 12/20/2021    K 4.1 12/20/2021    CO2 24.0 12/20/2021    CALCIUM 9.2 12/20/2021    PROTENTOTREF 7.4 12/14/2020    ALBUMIN 3.90 12/20/2021    LABIL2 0.9 12/14/2020    AST 21 12/20/2021    ALT 14 12/20/2021       ASSESSMENT:    · Metastatic breast cancer presenting as 1.1 cm mixed lytic/sclerotic hypermetabolic lesion in the left hemisacrum suspicious for metastatic disease 1.2 cm sclerotic lesion on L4, small L3 lesion and T11 lesion.  Tumor is ER positive, SC positive and HER-2/bishop negative.  Patient is currently on aromatase inhibitor, I have therefore recommended addition of CDK 4 inhibitor with Ibrance.  She will also benefit from biphosphonate therapy to prevent skeletal fractures.  Will discontinue Prolia and begin Xgeva as well.  Reviewed the side effects of Ibrance in detail with patient to include but not limited to fatigue, hepatic function abnormalities, interstitial lung disease, pancytopenia.  She is currently on combination of Ibrance, Arimidex and (Xgeva to be started once her dental procedure has been completed).  Her scans and tumor markers had suggest that she is responding to treatment. I have reviewed restaging CT scan of the chest, abdomen and pelvis completed 11/3/2021. There is no clear evidence of progressive disease.  Reviewed her bone scan  · Tooth ache probable secondary to infection.  Patient has appointment with dentist coming up.  She is currently on antibiotics.  She will let me know once her dental procedures are done.  Patient has not received Xgeva as she is still undergoing dental treatments.  She has been encouraged to let me know once completed so we can start her biphosphonate therapy: Reviewed patient is not yet ready also reviewed with her the importance of  biphosphonate  · Pancytopenia secondary to Ibrance: CBC reviewed  · Ongoing back pain issues: Reviewed her bone scan . MRI of the lumbar and pelvis area. We will also give patient referral to pain management with Dr. Hunter.  Continue Percocet to 10 mg every 4-6 hours as needed for pain  · ypT2 N1 aM0 status post left modified radical mastectomy with lymph node dissection and right prophylactic mastectomy in 2017 performed at [facility].  ER positive, AK positive and HER-2/bishop negative.  Status post bilateral chest wall reconstruction.  According to patient, she tolerated Arimidex very well except that she also had osteoporosis therefore Arimidex was discontinued at that time  · Intolerance of tamoxifen in the past  · Osteoporosis: She was on Prolia: We will transition to Xgeva since she has bone metastases  · Status post neoadjuvant Arimidex prior to bilateral mastectomies  · Thrombocytopenia: Work-up was - December 2020  · Neck pain status post cervical spine surgery: Resolved  · Complex cardiac medical history including tetralogy of Fallot, coarctation of aorta, VSD status post repair.  Status post stent placement for coarctation of aorta  · Personal history and strong family history of breast cancer in multiple in the relatives on paternal side of the family including 4 paternal aunts in their 30s and 40s and 2 maternal aunts at age of 20s and 50s.  There is concern for possible hereditary breast cancer syndrome.  Patient may be  interested in pursuing cancer genetics for her management.  · Assessment has been reviewed and updated          Discussion    Patient has metastatic breast cancer estrogen and progesterone dependent and HER-2/bishop negative.  She is currently on Arimidex.  We will add Ibrance.  We will also discontinue Prolia and begin Xgeva to help reduce skeletal events.           Plans:     · CT scan of the chest, abdomen and pelvis for cancer restaging reviewed  · Reviewed  results of her bone scan  · MRI of the lumbar and pelvis still pending  · CMP today  · Follow-up with pain management  · Continue Percocet 10 mg p.o. every 4 hours as needed for pain #40  · Referred to pain management with Dr. Hunter  · Refill Ibrance, continue Arimidex, calcium with vitamin D  · Follow-up with /counselor today  · Follow-up with dentist as soon as possible  · Reviewed work-up for thrombocytopenia which was negative  · Reviewed her bone density which showed osteoporosis  · Hold Xgeva 120 mg subcu monthly Xgeva has not been started yet: dental work not completed yet  · All questions answered  · Follow-up in  weeks or earlier as needed for problems  · All questions answered            Patient verbalized understanding and is in agreement of the above plan.           I spent 40 total minutes, face-to-face, caring for Gladys today.  80% of this time involved counseling and/or coordination of care as documented within this note.

## 2021-12-20 ENCOUNTER — LAB (OUTPATIENT)
Dept: LAB | Facility: HOSPITAL | Age: 61
End: 2021-12-20

## 2021-12-20 ENCOUNTER — SPECIALTY PHARMACY (OUTPATIENT)
Dept: PHARMACY | Facility: HOSPITAL | Age: 61
End: 2021-12-20

## 2021-12-20 ENCOUNTER — CLINICAL SUPPORT NO REQUIREMENTS (OUTPATIENT)
Dept: CARDIOLOGY | Facility: CLINIC | Age: 61
End: 2021-12-20

## 2021-12-20 ENCOUNTER — OFFICE VISIT (OUTPATIENT)
Dept: ONCOLOGY | Facility: CLINIC | Age: 61
End: 2021-12-20

## 2021-12-20 ENCOUNTER — OFFICE VISIT (OUTPATIENT)
Dept: CARDIOLOGY | Facility: CLINIC | Age: 61
End: 2021-12-20

## 2021-12-20 VITALS
BODY MASS INDEX: 30.11 KG/M2 | HEIGHT: 61 IN | WEIGHT: 159.5 LBS | OXYGEN SATURATION: 95 % | DIASTOLIC BLOOD PRESSURE: 85 MMHG | HEART RATE: 71 BPM | SYSTOLIC BLOOD PRESSURE: 149 MMHG

## 2021-12-20 VITALS
DIASTOLIC BLOOD PRESSURE: 95 MMHG | SYSTOLIC BLOOD PRESSURE: 190 MMHG | HEART RATE: 74 BPM | HEIGHT: 61 IN | RESPIRATION RATE: 20 BRPM | BODY MASS INDEX: 29.85 KG/M2

## 2021-12-20 DIAGNOSIS — Z79.4 INSULIN-REQUIRING OR DEPENDENT TYPE II DIABETES MELLITUS: ICD-10-CM

## 2021-12-20 DIAGNOSIS — E78.5 DYSLIPIDEMIA: ICD-10-CM

## 2021-12-20 DIAGNOSIS — C79.51 SPINE METASTASIS: ICD-10-CM

## 2021-12-20 DIAGNOSIS — I44.2 AV BLOCK, COMPLETE (HCC): ICD-10-CM

## 2021-12-20 DIAGNOSIS — C50.919 MALIGNANT NEOPLASM OF FEMALE BREAST, UNSPECIFIED ESTROGEN RECEPTOR STATUS, UNSPECIFIED LATERALITY, UNSPECIFIED SITE OF BREAST (HCC): Primary | ICD-10-CM

## 2021-12-20 DIAGNOSIS — I11.9 HYPERTENSIVE HEART DISEASE WITHOUT HEART FAILURE: ICD-10-CM

## 2021-12-20 DIAGNOSIS — Q24.9 CONGENITAL HEART DISEASE: ICD-10-CM

## 2021-12-20 DIAGNOSIS — I49.5 SICK SINUS SYNDROME (HCC): ICD-10-CM

## 2021-12-20 DIAGNOSIS — Z95.0 PACEMAKER: Primary | ICD-10-CM

## 2021-12-20 DIAGNOSIS — E11.9 INSULIN-REQUIRING OR DEPENDENT TYPE II DIABETES MELLITUS: ICD-10-CM

## 2021-12-20 LAB
ALBUMIN SERPL-MCNC: 3.9 G/DL (ref 3.5–5.2)
ALBUMIN/GLOB SERPL: 1.3 G/DL
ALP SERPL-CCNC: 84 U/L (ref 39–117)
ALT SERPL W P-5'-P-CCNC: 14 U/L (ref 1–33)
ANION GAP SERPL CALCULATED.3IONS-SCNC: 11 MMOL/L (ref 5–15)
AST SERPL-CCNC: 21 U/L (ref 1–32)
BASOPHILS # BLD AUTO: 0.02 10*3/MM3 (ref 0–0.2)
BASOPHILS NFR BLD AUTO: 0.3 % (ref 0–1.5)
BILIRUB SERPL-MCNC: 0.5 MG/DL (ref 0–1.2)
BUN SERPL-MCNC: 18 MG/DL (ref 6–20)
BUN/CREAT SERPL: 21.7 (ref 7–25)
CALCIUM SPEC-SCNC: 9.2 MG/DL (ref 8.6–10.5)
CHLORIDE SERPL-SCNC: 104 MMOL/L (ref 98–107)
CO2 SERPL-SCNC: 24 MMOL/L (ref 22–29)
CREAT SERPL-MCNC: 0.83 MG/DL (ref 0.57–1)
DEPRECATED RDW RBC AUTO: 43.6 FL (ref 37–54)
EOSINOPHIL # BLD AUTO: 0.14 10*3/MM3 (ref 0–0.4)
EOSINOPHIL NFR BLD AUTO: 2.3 % (ref 0.3–6.2)
ERYTHROCYTE [DISTWIDTH] IN BLOOD BY AUTOMATED COUNT: 13.3 % (ref 12.3–15.4)
GFR SERPL CREATININE-BSD FRML MDRD: 70 ML/MIN/1.73
GLOBULIN UR ELPH-MCNC: 3 GM/DL
GLUCOSE SERPL-MCNC: 238 MG/DL (ref 65–99)
HCT VFR BLD AUTO: 40.2 % (ref 34–46.6)
HGB BLD-MCNC: 13.1 G/DL (ref 12–15.9)
HOLD SPECIMEN: NORMAL
HOLD SPECIMEN: NORMAL
LYMPHOCYTES # BLD AUTO: 1.03 10*3/MM3 (ref 0.7–3.1)
LYMPHOCYTES NFR BLD AUTO: 16.9 % (ref 19.6–45.3)
MCH RBC QN AUTO: 30 PG (ref 26.6–33)
MCHC RBC AUTO-ENTMCNC: 32.6 G/DL (ref 31.5–35.7)
MCV RBC AUTO: 92 FL (ref 79–97)
MONOCYTES # BLD AUTO: 0.44 10*3/MM3 (ref 0.1–0.9)
MONOCYTES NFR BLD AUTO: 7.2 % (ref 5–12)
NEUTROPHILS NFR BLD AUTO: 4.45 10*3/MM3 (ref 1.7–7)
NEUTROPHILS NFR BLD AUTO: 73.3 % (ref 42.7–76)
PLATELET # BLD AUTO: 91 10*3/MM3 (ref 140–450)
PMV BLD AUTO: 10 FL (ref 6–12)
POTASSIUM SERPL-SCNC: 4.1 MMOL/L (ref 3.5–5.2)
PROT SERPL-MCNC: 6.9 G/DL (ref 6–8.5)
RBC # BLD AUTO: 4.37 10*6/MM3 (ref 3.77–5.28)
SODIUM SERPL-SCNC: 139 MMOL/L (ref 136–145)
WBC NRBC COR # BLD: 6.08 10*3/MM3 (ref 3.4–10.8)

## 2021-12-20 PROCEDURE — 85025 COMPLETE CBC W/AUTO DIFF WBC: CPT

## 2021-12-20 PROCEDURE — 99215 OFFICE O/P EST HI 40 MIN: CPT | Performed by: INTERNAL MEDICINE

## 2021-12-20 PROCEDURE — 36415 COLL VENOUS BLD VENIPUNCTURE: CPT

## 2021-12-20 PROCEDURE — 99204 OFFICE O/P NEW MOD 45 MIN: CPT | Performed by: INTERNAL MEDICINE

## 2021-12-20 PROCEDURE — 80053 COMPREHEN METABOLIC PANEL: CPT

## 2021-12-20 PROCEDURE — 93280 PM DEVICE PROGR EVAL DUAL: CPT | Performed by: INTERNAL MEDICINE

## 2021-12-20 NOTE — PROGRESS NOTES
MTM Note: Luz Marina    Met with Gladys in the clinic today.  She reports that she ran out of Ibrance about a month ago and has been unable to get a refill.  Contacted [facility] to check status and they report that she is out of refills, but the patient never contacted them to get a refill, or they would have sent us a request. They are sending a request now as well as paperwork to renew for next year.    Gladys was very emotional in the clinic.  She is having some family issues with her daughter-in-law and is not being permitted to see her grandchildren for [place].  She is upset and is also worried about who will take care of her  and her grown children when she dies.  She also mentions that she is having increasing head pain in the right side of her head.  She reports that one to two times a day, she is having intense pain that feels like she is being stabbed and that this pain lasts for 1-6 minutes.  She forgot to mention this to Dr. Nunez, so I passed this information on to her team.  She is also being referred to our .    Medication updated and working on getting refill for her medication.  Thanks,    Eda Her, PharmD

## 2021-12-21 ENCOUNTER — SPECIALTY PHARMACY (OUTPATIENT)
Dept: PHARMACY | Facility: HOSPITAL | Age: 61
End: 2021-12-21

## 2021-12-21 NOTE — PROGRESS NOTES
MTM note: Ibrance      12/20/2021   WBC 3.40 - 10.80 10*3/mm3 6.08   Neutrophils Absolute 1.70 - 7.00 10*3/mm3 4.45   Hemoglobin 12.0 - 15.9 g/dL 13.1   Hematocrit 34.0 - 46.6 % 40.2   Platelets 140 - 450 10*3/mm3 91 (A)   Creatinine 0.57 - 1.00 mg/dL 0.83   eGFR Non African Am >60 mL/min/1.73 70   BUN 6 - 20 mg/dL 18   Sodium 136 - 145 mmol/L 139   Potassium 3.5 - 5.2 mmol/L 4.1   Glucose 65 - 99 mg/dL 238 (A)   Calcium 8.6 - 10.5 mg/dL 9.2   Albumin 3.50 - 5.20 g/dL 3.90   Total Protein 6.0 - 8.5 g/dL 6.9   AST (SGOT) 1 - 32 U/L 21   ALT (SGPT) 1 - 33 U/L 14   Alkaline Phosphatase 39 - 117 U/L 84   Total Bilirubin 0.0 - 1.2 mg/dL 0.5   Has been out of Ibrance for a month - new rx arranged yesterday.  Vicente Davis, PharmD BCPS

## 2022-01-10 ENCOUNTER — OFFICE VISIT (OUTPATIENT)
Dept: PAIN MEDICINE | Facility: CLINIC | Age: 62
End: 2022-01-10

## 2022-01-10 VITALS
WEIGHT: 159 LBS | SYSTOLIC BLOOD PRESSURE: 156 MMHG | HEIGHT: 61 IN | OXYGEN SATURATION: 95 % | RESPIRATION RATE: 16 BRPM | DIASTOLIC BLOOD PRESSURE: 75 MMHG | BODY MASS INDEX: 30.02 KG/M2 | HEART RATE: 87 BPM

## 2022-01-10 DIAGNOSIS — G89.3 CANCER ASSOCIATED PAIN: Primary | ICD-10-CM

## 2022-01-10 PROCEDURE — 99214 OFFICE O/P EST MOD 30 MIN: CPT | Performed by: STUDENT IN AN ORGANIZED HEALTH CARE EDUCATION/TRAINING PROGRAM

## 2022-01-12 NOTE — TELEPHONE ENCOUNTER
Caller: LUCIEN SARGENT    Relationship to patient: PT     Best call back number: [phone]    Chief complaint: PT SEEKING SOONER FOLLOW UP FOR WORSENING LOW BACK PAIN AND LEG WEAKNESS (PT REPORTS RIGHT LEG GOES OUT WHEN SHE TRIES TO WALK). PT HAS BEEN TAKING TYLENOL AND THE MUSCLE RELAXERS SHE WAS PRESCRIBED WHILE SHE IS NOT AT WORK. PT IS ALSO USING ICY HOT LIDOCAINE CREAM TO HELP WITH PAIN.    Type of visit: FOLLOW UP    Requested date: ANY Monday OR Friday      If rescheduling, when is the original appointment: 2/17/2021 12 PM    Additional notes: PT FOLLOW UP WAS FOR 11 WEEKS TO CHECK FUSION FROM NECK SURGERY. PT WOULD LIKE TO BE SEEN FOR HER LOW BACK PAIN AND LEG WEAKNESS SOONER IF POSSIBLE (INTERFERING WITH PT'S WORK).    PT REQUESTING SOONER EVAL OF LOW BACK DUE TO PAIN LEVELS IF POSSIBLE. IF FUSION OF NECK CAN BE EVALUATED AT THE SAME TIME, PT WOULD APPRECIATE IT.    PLEASE CALL PT TO SCHEDULE.    THANK YOU.     
1 = Total assistance

## 2022-01-19 ENCOUNTER — APPOINTMENT (OUTPATIENT)
Dept: MRI IMAGING | Facility: HOSPITAL | Age: 62
End: 2022-01-19

## 2022-01-27 ENCOUNTER — TELEPHONE (OUTPATIENT)
Dept: CARDIOLOGY | Facility: CLINIC | Age: 62
End: 2022-01-27

## 2022-01-27 ENCOUNTER — HOSPITAL ENCOUNTER (OUTPATIENT)
Dept: MRI IMAGING | Facility: HOSPITAL | Age: 62
Discharge: HOME | End: 2022-01-27

## 2022-01-27 DIAGNOSIS — C50.919 MALIGNANT NEOPLASM OF FEMALE BREAST, UNSPECIFIED ESTROGEN RECEPTOR STATUS, UNSPECIFIED LATERALITY, UNSPECIFIED SITE OF BREAST: ICD-10-CM

## 2022-01-27 DIAGNOSIS — M89.8X9 BONE PAIN: ICD-10-CM

## 2022-01-27 DIAGNOSIS — C79.51 SPINE METASTASIS: ICD-10-CM

## 2022-01-28 ENCOUNTER — TELEPHONE (OUTPATIENT)
Dept: ONCOLOGY | Facility: CLINIC | Age: 62
End: 2022-01-28

## 2022-01-28 NOTE — TELEPHONE ENCOUNTER
Spoke to Caitlyn with MRI who stated that the pt is unable to get her MRI done due to the settings of her pacemaker. I told her I would pass this on to Dr. Nunez.

## 2022-01-28 NOTE — TELEPHONE ENCOUNTER
Spoke to MRI department at [facility], they were unable to complete MRI due to patient programmed unipolar vs bipolar. Patient is new to our office, unable to explain why patient was programmed this way. Her leads were implanted 5/12/2007.

## 2022-01-29 ENCOUNTER — APPOINTMENT (OUTPATIENT)
Dept: GENERAL RADIOLOGY | Facility: HOSPITAL | Age: 62
End: 2022-01-29

## 2022-01-29 ENCOUNTER — HOSPITAL ENCOUNTER (EMERGENCY)
Facility: HOSPITAL | Age: 62
Discharge: HOME | End: 2022-01-29
Attending: EMERGENCY MEDICINE | Admitting: EMERGENCY MEDICINE

## 2022-01-29 VITALS
HEIGHT: 61 IN | RESPIRATION RATE: 18 BRPM | TEMPERATURE: 97.4 F | HEART RATE: 70 BPM | SYSTOLIC BLOOD PRESSURE: 148 MMHG | WEIGHT: 149 LBS | DIASTOLIC BLOOD PRESSURE: 80 MMHG | BODY MASS INDEX: 28.13 KG/M2 | OXYGEN SATURATION: 97 %

## 2022-01-29 DIAGNOSIS — S86.912A STRAIN OF LEFT KNEE, INITIAL ENCOUNTER: Primary | ICD-10-CM

## 2022-01-29 DIAGNOSIS — S96.912A STRAIN OF LEFT ANKLE, INITIAL ENCOUNTER: ICD-10-CM

## 2022-01-29 PROCEDURE — 73610 X-RAY EXAM OF ANKLE: CPT

## 2022-01-29 PROCEDURE — 73560 X-RAY EXAM OF KNEE 1 OR 2: CPT

## 2022-01-29 PROCEDURE — 99283 EMERGENCY DEPT VISIT LOW MDM: CPT

## 2022-01-31 ENCOUNTER — HOSPITAL ENCOUNTER (OUTPATIENT)
Dept: PAIN MEDICINE | Facility: HOSPITAL | Age: 62
Discharge: HOME | End: 2022-01-31

## 2022-01-31 ENCOUNTER — APPOINTMENT (OUTPATIENT)
Dept: LAB | Facility: HOSPITAL | Age: 62
End: 2022-01-31

## 2022-01-31 VITALS
HEART RATE: 78 BPM | BODY MASS INDEX: 28.13 KG/M2 | OXYGEN SATURATION: 96 % | HEIGHT: 61 IN | DIASTOLIC BLOOD PRESSURE: 92 MMHG | RESPIRATION RATE: 16 BRPM | WEIGHT: 149 LBS | TEMPERATURE: 97.3 F | SYSTOLIC BLOOD PRESSURE: 150 MMHG

## 2022-01-31 DIAGNOSIS — G89.3 CANCER ASSOCIATED PAIN: ICD-10-CM

## 2022-01-31 DIAGNOSIS — R52 PAIN: ICD-10-CM

## 2022-01-31 PROCEDURE — 77003 FLUOROGUIDE FOR SPINE INJECT: CPT

## 2022-01-31 PROCEDURE — 62323 NJX INTERLAMINAR LMBR/SAC: CPT | Performed by: STUDENT IN AN ORGANIZED HEALTH CARE EDUCATION/TRAINING PROGRAM

## 2022-01-31 PROCEDURE — 25010000002 FENTANYL CITRATE (PF) 50 MCG/ML SOLUTION: Performed by: STUDENT IN AN ORGANIZED HEALTH CARE EDUCATION/TRAINING PROGRAM

## 2022-01-31 RX ORDER — FENTANYL CITRATE 50 UG/ML
25 INJECTION, SOLUTION INTRAMUSCULAR; INTRAVENOUS ONCE
Status: COMPLETED | OUTPATIENT
Start: 2022-01-31 | End: 2022-01-31

## 2022-01-31 RX ORDER — FENTANYL CITRATE 50 UG/ML
INJECTION, SOLUTION INTRAMUSCULAR; INTRAVENOUS
Status: DISCONTINUED
Start: 2022-01-31 | End: 2022-02-01 | Stop reason: HOSPADM

## 2022-01-31 RX ADMIN — FENTANYL CITRATE 25 MCG: 50 INJECTION, SOLUTION INTRAMUSCULAR; INTRAVENOUS at 13:24

## 2022-01-31 NOTE — PROCEDURES
Intrathecal Injection of fentanyl - Single Shot Spinal as a Trial for [facility]    PREOPERATIVE DIAGNOSIS:  Cancer pain    POSTOPERATIVE DIAGNOSIS:  Same as preop diagnosis    PROCEDURE:   Intrathecal Fentanyl Trial, as a single shot spinal, with fluoroscopic guidance, in the L3/4 interspace.       PRE-PROCEDURE DISCUSSION WITH PATIENT:    Risks and complications were discussed with the patient prior to starting the procedure and informed consent was obtained.  We discussed various topics including but not limited to bleeding, infection, injury, nausea, vomiting, hypotension, hallucination, mental status changes, nerve injury, paralysis, coma, death, postprocedural painful flare-up, postprocedural site soreness, and a lack of pain relief.        SURGEON:  Chuck Hunter MD    REASON FOR PROCEDURE:    Chronic cancer pain.  The trial is necessary to determine if this patient may be a responder to chronic intrathecal administration of opioid pain medication for chronic, intractable pain, and also to evaluate whether this patient may or may not be predisposed to intolerable side effects of the therapy    SEDATION:  Patient declined administration of moderate sedation      DOSE OF Fentanyl:    25 mcg    DESCRIPTON OF PROCEDURE:  After obtaining informed consent, an I.V. was not started in the preoperative area. The patient taken to the operating room and was placed in the prone position.  All pressure points were well padded.  EKG, blood pressure, and pulse oximeter were monitored.  The Lumbar was identified and marked.  The appropriate area was prepped with Chloraprep and draped in a sterile fashion.   Strict sterile technique was observed throughout.    The area over the aforementioned interspace was anesthetized with 1-2ml of 1% lidocaine, and then a spinal needle was advanced under sequential PA fluoroscopic image guidance into the interspace, and advanced through the epidural space, and the dura  was punctured.  Positive CSF flow was noted in the needle.  Contrast dye was not injected to confirm intrathecal administration.  Needle depth was confirmed appropriate on lateral fluoroscopic view.      Then, the aforementioned dose was injected into the intrathecal space.  The needle was removed intact.  Vital signs were stable throughout.      She experienced 50% relief of her pain after the injection and wishes to proceed with implantation.  Will authorize [employer] ITP.        ESTIMATED BLOOD LOSS:  <5 mL  SPECIMENS:  none    COMPLICATIONS:   No complications were noted., There was indication that a dural puncture occurred. and Aspiration was positive for CSF flow.      TOLERANCE & DISCHARGE CONDITION:    The patient tolerated the procedure well.  The patient was transported to the recovery area without difficulties.  The patient was observed for an extended period of time in the recovery room, was examined multiple times, and was encouraged to walk and engage in activity frequently to assess the immediate pain relief.  The patient was discharged to home under the care of family, after this extended PACU stay, and after ensuring a lack of intolerable side effects from the injection, in stable and satisfactory condition.    PLAN OF CARE:  1. The patient was given our standard instruction sheet.  2. The patient will Plan for Implantation of intrathecal pain pump..  3. The patient will resume all medications as per the medication reconciliation sheet.

## 2022-01-31 NOTE — DISCHARGE INSTRUCTIONS
EPIDURAL STEROID INJECTION          An epidural steroid injection is a shot of steroid medicine and numbing medicine that is given into the space between the spinal cord and the bones of the back (epidural space).  The injection helps relieve pain by an irritated or swollen nerve root.    TELL YOUR HEALTH CARE PROVIDER ABOUT:  • Any allergies you have  • All medicines you are taking including any over the counter medicines  • Any blood disorders you have  • Any surgeries you have had  • Any medical conditions you have  • Whether you are pregnant or may be pregnant    WHAT ARE THE RISK?  Generally, this is a safe procedure. However,problems may occur, including  • Headache  • Bleeding  • Infection  • Allergic Reaction  • Nerve Damage    WHAT CAN I EXPECT AFTER THE PROCEDURE?    INJECTION SITE  • Remove the Band-Aid/s after 24 hours  • Check your injection site every day for signs of infection.  Check for:             Redness             Bleeding (small amt is normal)             Warmth             Pus or bad odor  • Some numbness may be experienced for several hours following the procedure.  • Avoid using heat on the injection site for 24 hours. You may use ice intermittently if needed by placing a         towel between your skin and the ice bag and using the ice for 20 minutes 2-3 times a day.  • Do not take baths, swim or use a hot tub for 24 hours.    ACTIVITY  • No strenuous activity for 24 hours then return to normal activity as tolerated.  • If your leg is numb, no driving until full sensation and strength has returned.    GENERAL INSTRUCTIONS:  • The injection site may feel numb, use ice with caution if numbness is present and no heat for 24 hours or until numbness is gone.   • If you have numbness or weakness in your arm or leg, use those areas with caution until normal sensation returns.  • It is not uncommon to notice an increase in discomfort or a change in the location of discomfort for 3-4 days after  the procedure.  If discomfort is noticed at the injection site, ice may be            applied to that area for 20 min 2-3 times a day.  • Take the pain medicine your physician has prescribed or over the counter pain relievers as long as you do not have any contraindications.  • If you are a diabetic, monitor your blood sugar closely.  The steroids used in your procedure may increase your blood sugar level up to 36 hours after the injection.  If your blood sugar is greater than 250, call the physician that helps you monitor your blood sugar.  • Keep all follow-up visits as scheduled by your health care provider. This is important.    CONTACT OUR OFFICE IF:  • You have any of these signs of infection            -Redness, swelling, or warmth around your injection site.            -Fluid or blood coming from your injection site (small amt of blood is normal)            -Pus or a bad odor from your injection site            -A fever  • You develop a severe headache or a stiff neck  • You lose control of your bladder or bowel movements      [facility] HOURS   • Monday-Friday 7:30 am. - 4:00 pm.  For any problem related to your procedure we can be reached at [phone]  • If you experience an emergency with your procedure, call [phone] or go to the emergency room.

## 2022-02-01 ENCOUNTER — TELEPHONE (OUTPATIENT)
Dept: PAIN MEDICINE | Facility: HOSPITAL | Age: 62
End: 2022-02-01

## 2022-02-01 ENCOUNTER — PATIENT OUTREACH (OUTPATIENT)
Dept: CASE MANAGEMENT | Facility: OTHER | Age: 62
End: 2022-02-01

## 2022-02-01 NOTE — TELEPHONE ENCOUNTER
Post op procedure call made. Spoke with patient, patient did have some continued itching but was advised it was normal by Dr. Hunter. Her pain did come back at 2:00am. We are awaiting insurance authorazation for pain pump insertion.

## 2022-02-01 NOTE — OUTREACH NOTE
Ambulatory Case Management Note    Care Evaluation    Questions/Answers      Most Recent Value   Other Patient Education/Resources  24/7 [facility] Nurse Call Line   24/7 Nurse Call Line Education Method Verbal        Patient Outreach    Pt discharged from [facility] ED on 1/29/22, seen for left knee and left ankle injury s/p fall. RN-ACM outreach call made to pt, spoke very briefly. Explained role of RN-ACM. Pt c/o pain and bruising to areas. Discussed pain tx. Reviewed ED AVS with pt. Pt states she plans to call her PCP for appointment tomorrow if no improvement in symptoms. Unable to further outreach, pt thanks RN-ACM for calling and ended call. Follow up outreach prn.     Robel Noe RN  Ambulatory Case Management    2/1/2022, 14:40 EST     10-Mar-2021 13:16

## 2022-02-07 ENCOUNTER — TELEPHONE (OUTPATIENT)
Dept: ONCOLOGY | Facility: CLINIC | Age: 62
End: 2022-02-07

## 2022-02-07 RX ORDER — ONDANSETRON HYDROCHLORIDE 8 MG/1
8 TABLET, FILM COATED ORAL EVERY 8 HOURS PRN
Qty: 30 TABLET | Refills: 0 | Status: SHIPPED | OUTPATIENT
Start: 2022-02-07 | End: 2022-05-02

## 2022-02-07 NOTE — TELEPHONE ENCOUNTER
Provider: DR LYLE    Caller: LUCIEN    Relationship to Patient: SELF    Pharmacy: [facility]    Phone Number: [phone]    Reason for Call: LUCIEN IS CALLING STATES SHE NEEDS TO R/S HER APPTS TODAY, SHE IS NAUSEATED BECAUSE SHE HAS STARTED TAKING HER  Palbociclib (Ibrance) 125 MG capsule capsule AGAIN.     SHE ASKS IF SHE CAN R/S UNTIL NEXT WEEK ON A MONDAY AND IF SHE CAN GET SOMETHING CALLED IN FOR NAUSEA.

## 2022-02-07 NOTE — TELEPHONE ENCOUNTER
PATIENT CANCELLED TODAY'S MD APPT DUE TO SEVERE NAUSEA.  SHE IS WANTING TO KNOW IF DR. LYLE WILL PRESCRIBE SOMETHING FOR THE NAUSEA.  SPOKE WITH DR. LYLE.  SHE PRESCRIBED ZOFRAN.  APPT RESCHEDULED.  PATIENT V/U. ON DATE AND TIME.

## 2022-02-16 ENCOUNTER — TELEPHONE (OUTPATIENT)
Dept: PAIN MEDICINE | Facility: CLINIC | Age: 62
End: 2022-02-16

## 2022-02-16 NOTE — TELEPHONE ENCOUNTER
Caller: LUCIEN SARGENT    Relationship to patient: SELF    Best call back number:     Chief complaint: PAIN    Type of visit: SPINAL CORD STIMULATOR    Requested date: ASAP    Additional notes: THE PATIENT HAD HER SPINAL CORD STIMULATOR TRIAL ON 1/31/22 AND SHE WOULD LIKE TO KNOW WHEN SHE CAN GET HER SPINAL CORD STIMULATOR OR IF THE OFFICE IS STILL WAITING FOR APPROVAL.    PLEASE CALL THE PATIENT BACK AT THE NUMBER ABOVE

## 2022-02-23 ENCOUNTER — TELEPHONE (OUTPATIENT)
Dept: PAIN MEDICINE | Facility: CLINIC | Age: 62
End: 2022-02-23

## 2022-02-24 DIAGNOSIS — C79.51 SPINE METASTASIS: ICD-10-CM

## 2022-02-24 DIAGNOSIS — C50.919 MALIGNANT NEOPLASM OF FEMALE BREAST, UNSPECIFIED ESTROGEN RECEPTOR STATUS, UNSPECIFIED LATERALITY, UNSPECIFIED SITE OF BREAST: ICD-10-CM

## 2022-02-24 RX ORDER — OXYCODONE AND ACETAMINOPHEN 10; 325 MG/1; MG/1
1 TABLET ORAL DAILY PRN
Qty: 20 TABLET | Refills: 0 | Status: SHIPPED | OUTPATIENT
Start: 2022-02-24 | End: 2022-03-17 | Stop reason: SDUPTHER

## 2022-02-25 ENCOUNTER — HOSPITAL ENCOUNTER (EMERGENCY)
Facility: HOSPITAL | Age: 62
Discharge: HOME | End: 2022-02-25
Admitting: EMERGENCY MEDICINE

## 2022-02-25 ENCOUNTER — APPOINTMENT (OUTPATIENT)
Dept: GENERAL RADIOLOGY | Facility: HOSPITAL | Age: 62
End: 2022-02-25

## 2022-02-25 VITALS
SYSTOLIC BLOOD PRESSURE: 159 MMHG | BODY MASS INDEX: 30.14 KG/M2 | HEIGHT: 61 IN | OXYGEN SATURATION: 99 % | WEIGHT: 159.61 LBS | RESPIRATION RATE: 18 BRPM | DIASTOLIC BLOOD PRESSURE: 98 MMHG | TEMPERATURE: 97.3 F | HEART RATE: 75 BPM

## 2022-02-25 DIAGNOSIS — S80.11XA CONTUSION OF RIGHT LOWER LEG, INITIAL ENCOUNTER: Primary | ICD-10-CM

## 2022-02-25 DIAGNOSIS — S80.811A ABRASION OF RIGHT LOWER LEG, INITIAL ENCOUNTER: ICD-10-CM

## 2022-02-25 PROCEDURE — 99282 EMERGENCY DEPT VISIT SF MDM: CPT

## 2022-02-25 PROCEDURE — 90715 TDAP VACCINE 7 YRS/> IM: CPT | Performed by: EMERGENCY MEDICINE

## 2022-02-25 PROCEDURE — 99283 EMERGENCY DEPT VISIT LOW MDM: CPT

## 2022-02-25 PROCEDURE — 25010000002 TETANUS-DIPHTH-ACELL PERTUSSIS 5-2.5-18.5 LF-MCG/0.5 SUSPENSION PREFILLED SYRINGE: Performed by: EMERGENCY MEDICINE

## 2022-02-25 PROCEDURE — 73590 X-RAY EXAM OF LOWER LEG: CPT

## 2022-02-25 PROCEDURE — 90471 IMMUNIZATION ADMIN: CPT | Performed by: EMERGENCY MEDICINE

## 2022-02-25 RX ADMIN — TETANUS TOXOID, REDUCED DIPHTHERIA TOXOID AND ACELLULAR PERTUSSIS VACCINE, ADSORBED 0.5 ML: 5; 2.5; 8; 8; 2.5 SUSPENSION INTRAMUSCULAR at 22:46

## 2022-03-01 ENCOUNTER — TELEPHONE (OUTPATIENT)
Dept: PAIN MEDICINE | Facility: CLINIC | Age: 62
End: 2022-03-01

## 2022-03-01 ENCOUNTER — TELEPHONE (OUTPATIENT)
Dept: ONCOLOGY | Facility: CLINIC | Age: 62
End: 2022-03-01

## 2022-03-01 NOTE — TELEPHONE ENCOUNTER
Caller: LUCIEN  Relationship to Patient: SELF    Phone Number: [phone]  Reason for Call: PT CALLED STATING THAT SHE RECEIVED A PHONE CALL FROM OFFICE. SHE BELIEVES IT TO BE ABOUT SCS PLACEMENT. PLEASE ADVISE PT AT ABOVE PHONE NUMBER PT STATES THAT SHE IS AVAILABLE AT 1:00 PM TODAY ON HER LUNCH BREAK.

## 2022-03-01 NOTE — TELEPHONE ENCOUNTER
Caller: Gladys Garrison    Relationship to patient: Self    Best call back number: [phone] - LEAVE  IF NO ANSWER.     Chief complaint: PATIENT IS CURRENTLY SCHEDULED FOR 3/2/22 BUT NEEDS TO RESCHEDULE.  HER SISTER HAS BEEN IN AN ACCIDENT & SHE WILL BE OUT OF TOWN UNTIL NEXT WEEK.     Type of visit: LAB/FU    Requested date: PATIENT CAN COME IN NEXT Monday     If rescheduling, when is the original appointment: 3/2/22    Additional notes: PLEASE CALL PATIENT TO ADVISE OF NEW APPT DATE/TIME

## 2022-03-02 ENCOUNTER — APPOINTMENT (OUTPATIENT)
Dept: LAB | Facility: HOSPITAL | Age: 62
End: 2022-03-02

## 2022-03-17 ENCOUNTER — TELEPHONE (OUTPATIENT)
Dept: PAIN MEDICINE | Facility: CLINIC | Age: 62
End: 2022-03-17

## 2022-03-17 DIAGNOSIS — C79.51 SPINE METASTASIS: ICD-10-CM

## 2022-03-17 DIAGNOSIS — C50.919 MALIGNANT NEOPLASM OF FEMALE BREAST, UNSPECIFIED ESTROGEN RECEPTOR STATUS, UNSPECIFIED LATERALITY, UNSPECIFIED SITE OF BREAST: ICD-10-CM

## 2022-03-17 RX ORDER — OXYCODONE AND ACETAMINOPHEN 10; 325 MG/1; MG/1
1 TABLET ORAL DAILY PRN
Qty: 20 TABLET | Refills: 0 | Status: SHIPPED | OUTPATIENT
Start: 2022-03-17 | End: 2022-03-28 | Stop reason: SDUPTHER

## 2022-03-18 NOTE — PROGRESS NOTES
Hematology/Oncology Outpatient Follow Up    PATIENT NAME:Gladys Garrison  :1960  MRN: [id number]  PRIMARY CARE PHYSICIAN: Angelica Rodriguez MD  REFERRING PHYSICIAN: Angelica Rodriguez MD    Chief Complaint   Patient presents with   • Follow-up     Malignant neoplasm of female breast, unspecified estrogen receptor status, unspecified laterality, unspecified site of breast (HCC)        HISTORY OF PRESENT ILLNESS:     This is a 60-year-old female who multiple comorbidities including congenital abnormalities such as hypoplastic kidney, tetralogy of Fallot, coarctation of the aorta and congenital VSD status post repair.  Patient developed syncopal episode and for that reason she had she had a CT scan of the chest which showed mass in the left breast.  She had diagnostic mammogram and ultrasound which showed a 2 cm spiculated mass in the left breast at the 1 o'clock position 6 cm from the nipple.  Biopsy of the left breast mass revealed invasive moderately differentiated carcinoma ER/GA positive and HER-2/bishop negative.  Patient also had an ultrasound of the axilla which was concerning for an abnormal left axillary lymph node with cortical thickening. She apparently had an FNA of the left axillary nodule which was positive for malignancy.  On 2017 patient underwent left modified radical mastectomy, right prophylactic mastectomy and immediate breast reconstruction. She also underwent right prophylactic mastectomy.  We have had her on records suggest that patient did have multifocal disease with pT2 pN1 aM0.  The largest focus measured 3.5 cm.  2 of 11 lymph nodes were positive for metastatic disease some with extracapsular extension.  Notes that patient did receive Arimidex neoadjuvant  from 2017 to 2018 prior to her bilateral mastectomies.    Review of her note suggest that she developed cough which she attributed to anastrozole and patient was then placed on tamoxifen.  She had MammaPrint  testing which returned with low risk for relapse.    Her postop course was also complicated by left chest wall abscess which resulted in I&D and removal of the left tissue expander. She was referred for radiation treatment boards ultimately declined.    Patient was then placed on tamoxifen in April 2018 which she stopped after less than a month due to nausea and vomiting.  Patient in the interim also was diagnosed with chronic hepatitis C was seen by the hepatologist.  Tamoxifen was dose reduced to 10 mg daily with the goal to increase to 20 mg daily.      Patient has relocated to [facility].  She has transitioned her care to us now.  She is currently not on any antiestrogen therapy.     Her bilateral mastectomy specimen are available for review    · 1/29/2021 patient had bone density which showed osteoporosis  · Patient was seen by neurosurgery and had a bone scan done in March 2021 which showed subtle activity in the inferior L4 vertebral body appears to correlate with new area of sclerosis on CT of the abdomen and pelvis.  There is also subtle activity with possible new lesion at the posterior left sacrum.  These findings were concerning for metastatic disease.  PET CT scan was recommended to further evaluate.  · 4/8/2021 patient had a PET CT scan which showed evidence of disease in the neck chest abdomen and pelvis.  But she has a 1.1 cm mixed lytic/sclerotic hypermetabolic lesion within the left hemisacrum consistent with metastatic disease.  There is also accumulation within the inferior endplate of the L4 vertebral body thought to correspond to 1.2 centimeters sclerotic lesion consistent with metastatic disease.  There is a tiny hypermetabolic focus within the L3, T11.  Suspicious for also early metastatic disease.  · 4/26/2021 patient underwent CT-guided biopsy of sacral lesion, pathology was consistent with metastatic breast cancer ER and OH positive HER-2/bishop was negative.  · 7/6/21 CT new sclerosis  within the right 12th rib posteriorly which could represent metastatic lesion or a healed or healing fracture, new sclerosis within the right 12th rib posteriorly which         could represent metastatic lesion,new sclerosis within the right T8 transverse process worrisome for metastatic        disease progression.  · 7/7/21 complaints of 8/10 back pain and 8/10 LUQ pain. Referral to radiation for questionable mets on CT chest.  · 7/26/2021 patient had CT scan of the head with contrast with did not show any evidence of metastatic disease.  CT scan of the chest abdomen and pelvis did not show any evidence of progressive disease.  · 7/26/2021 patient had CEA level which shows declining values CA 15-3 has decreased to 62 from 84  · 11/3/2021: Patient had CT scan of the chest, abdomen and pelvis. In the chest there were no suspicious pulmonary nodules. There is no clear evidence of progressive disease  · 12/13/2021 patient had a bone scan which showed uptake along the posterior right ribs consistent with rib fractures.  There is mild uptake in the L4 vertebral body corresponding to the sclerotic lesion seen on previous CT scan.  This was likely due to metastatic disease    History of present illness was reviewed and is unchanged from the previous visit. 07/07/21      Past Medical History:   Diagnosis Date   • Allergic    • Bone cancer (HCC)    • Breast cancer (HCC) 2017    mets to lymph nodes; did not do radiation   • Cancer of unknown origin (HCC)    • Compression fx, thoracic spine, open, initial encounter (HCC)    • Coronary artery disease    • COVID-19 09/01/2021   • Diabetes mellitus (HCC)    • Heart disease, unspecified    • Hepatitis C    • Hypertension    • Obesity (BMI 30-39.9) 2/5/2021   • Sleep apnea     no machine   • Type 2 diabetes mellitus (HCC) 11/2017       Past Surgical History:   Procedure Laterality Date   • BACK SURGERY      neck   • BREAST RECONSTRUCTION Bilateral    • CARDIAC ABLATION       atrial  tachycardia x 5 ablations    • CARDIAC CATHETERIZATION     • CARDIAC SURGERY      stent placed in aorta   • CARDIAC SURGERY      6 surgeries as baby    • CERVICAL FUSION ANTERIOR WITH ARTIFICIAL DISCECTOMY IMPLANTATION N/A 2020    Procedure: C4 VERTEBRECTOMY AND ANTERIOR CERIVCAL DISCECTOMY WITH FUSION OF CERVICAL THREE THROUGH FIVE WITH REMOVAL OF HARDWARE C5-C6;  Surgeon: Mark Kaba MD;  Location: [facility] MAIN OR;  Service: Neurosurgery;  Laterality: N/A;   •  SECTION      x2   • COLONOSCOPY N/A 10/23/2020    Procedure: COLONOSCOPY WITH POLYPECTOMY X6;  Surgeon: Stanley Bourne MD;  Location: [facility] ENDOSCOPY;  Service: Gastroenterology;  Laterality: N/A;  POLYPS, INTERNAL HEMORRHOIDS   • ENDOSCOPY N/A 10/23/2020    Procedure: ESOPHAGOGASTRODUODENOSCOPY WITH BIOPSY X 1 AREA;  Surgeon: Stanley Bourne MD;  Location: [facility] ENDOSCOPY;  Service: Gastroenterology;  Laterality: N/A;  GASTRITIS, ESOPHAGITIS, HIATAL HERNIA   • KNEE SURGERY     • MASTECTOMY Bilateral    • NECK SURGERY     • PACEMAKER IMPLANTATION           Current Outpatient Medications:   •  amLODIPine (Norvasc) 5 MG tablet, Take 1 tablet by mouth Daily., Disp: 30 tablet, Rfl: 1  •  anastrozole (ARIMIDEX) 1 MG tablet, Take 1 tablet by mouth Daily., Disp: 30 tablet, Rfl: 6  •  aspirin 81 MG chewable tablet, Chew 1 tablet Daily., Disp: 30 tablet, Rfl: 1  •  Blood Glucose Monitoring Suppl (Accu-Chek Araceli) device, Use as instructed   To test blood sugar bid, Disp: 1 each, Rfl: 0  •  Calcium Carbonate-Vit D-Min (Calcium 600+D Plus Minerals) 600-400 MG-UNIT chewable tablet, Chew 600 mg 2 (Two) Times a Day., Disp: 60 each, Rfl: 6  •  celecoxib (CeleBREX) 200 MG capsule, Take 1 capsule by mouth Daily., Disp: 30 capsule, Rfl: 1  •  glucose blood (Accu-Chek Araceli Plus) test strip, Use as instructed   To test bid, Disp: 200 each, Rfl: 3  •  insulin NPH-insulin regular (humuLIN 70/30,novoLIN 70/30) (70-30) 100 UNIT/ML  injection, Inject 40 Units under the skin into the appropriate area as directed Every Morning., Disp: , Rfl:   •  insulin NPH-insulin regular (humuLIN 70/30,novoLIN 70/30) (70-30) 100 UNIT/ML injection, Inject 30 Units under the skin into the appropriate area as directed Every Evening., Disp: , Rfl:   •  Lancets (accu-chek soft touch) lancets, Test bid, Disp: 200 each, Rfl: 3  •  lisinopril (PRINIVIL,ZESTRIL) 20 MG tablet, Take 20 mg by mouth Daily., Disp: , Rfl:   •  ondansetron (Zofran) 8 MG tablet, Take 1 tablet by mouth Every 8 (Eight) Hours As Needed for Nausea or Vomiting., Disp: 30 tablet, Rfl: 0  •  oxyCODONE-acetaminophen (PERCOCET)  MG per tablet, Take 1 tablet by mouth Daily As Needed for Moderate Pain  or Severe Pain . 1 tablet every 4-6 hours prn pain, Disp: 20 tablet, Rfl: 0  •  Palbociclib (Ibrance) 125 MG capsule capsule, Take 1 capsule by mouth Daily. Take on days 1-21 with food and then off for 7 days., Disp: 21 capsule, Rfl: 6  •  rosuvastatin (Crestor) 20 MG tablet, Take 1 tablet by mouth Every Night., Disp: 90 tablet, Rfl: 0    Allergies   Allergen Reactions   • Promethazine Mental Status Change   • Tape Other (See Comments)     .blisters         Family History   Problem Relation Age of Onset   • Heart disease Mother    • Stroke Mother    • Lung cancer Mother    • Aneurysm Father    • Diabetes Sister    • No Known Problems Brother    • No Known Problems Brother    • Diabetes type I Half-Sister    • Thyroid cancer Half-Sister    • Cancer Maternal Aunt    • Heart attack Sister    • Thyroid disease Sister    • No Known Problems Sister        Cancer-related family history includes Cancer in her maternal aunt; Lung cancer in her mother; Thyroid cancer in her half-sister.    Social History     Tobacco Use   • Smoking status: Never Smoker   • Smokeless tobacco: Never Used   Vaping Use   • Vaping Use: Never used   Substance Use Topics   • Alcohol use: Yes     Alcohol/week: 1.0 standard drink      "Types: 1 Glasses of wine per week     Comment: rare   • Drug use: Not Currently       HPI, ROS and PFSH have been reviewed and confirmed on 3/22/2022.       SUBJECTIVE:    She complains of low back pain but no bowel or bladder issues. She has not been taking Percocet because it was not helping.      Patient is here today for routine follow-up.  She follows up closely with the pain clinic          REVIEW OF SYSTEMS:    Review of Systems   Constitutional: Negative for chills and fever.   HENT: Negative for ear pain, mouth sores, nosebleeds and sore throat.    Eyes: Negative for photophobia and visual disturbance.   Respiratory: Negative for wheezing and stridor.    Cardiovascular: Negative for chest pain and palpitations.   Gastrointestinal: Negative for abdominal pain, diarrhea, nausea and vomiting.   Endocrine: Negative for cold intolerance and heat intolerance.   Genitourinary: Negative for dysuria and hematuria.   Musculoskeletal: Negative for joint swelling and neck stiffness.   Skin: Negative for color change and rash.   Neurological: Negative for seizures and syncope.   Hematological: Negative for adenopathy.        No obvious bleeding   Psychiatric/Behavioral: Negative for agitation, confusion and hallucinations.     OBJECTIVE:    Vitals:    03/22/22 1339   Resp: 18   Temp: 97.1 °F (36.2 °C)   Weight: 72.1 kg (159 lb)   Height: 154.9 cm (61\")   PainSc:   5     Body mass index is 30.04 kg/m².    ECOG    (1) Restricted in physically strenuous activity, ambulatory and able to do work of light nature    Physical Exam  Vitals and nursing note reviewed.   Constitutional:       General: She is not in acute distress.     Appearance: Normal appearance. She is not diaphoretic.   HENT:      Head: Normocephalic and atraumatic.      Mouth/Throat:      Mouth: Mucous membranes are moist.      Pharynx: Oropharynx is clear.   Eyes:      General: No scleral icterus.        Right eye: No discharge.         Left eye: No " discharge.      Extraocular Movements: Extraocular movements intact.      Conjunctiva/sclera: Conjunctivae normal.   Neck:      Thyroid: No thyromegaly.   Cardiovascular:      Rate and Rhythm: Normal rate and regular rhythm.      Pulses: Normal pulses.      Heart sounds: Normal heart sounds.     No friction rub. No gallop.   Pulmonary:      Effort: Pulmonary effort is normal. No respiratory distress.      Breath sounds: Normal breath sounds. No stridor. No wheezing.   Abdominal:      General: Bowel sounds are normal. There is distension.      Palpations: Abdomen is soft. There is no mass.      Tenderness: There is abdominal tenderness. There is guarding (Left upper abdomen). There is no rebound.   Musculoskeletal:         General: No tenderness. Normal range of motion.      Cervical back: Normal range of motion and neck supple.      Right lower leg: No edema.      Left lower leg: No edema.      Comments: Neck pain.  Patient has a neck collar.   Lymphadenopathy:      Cervical: No cervical adenopathy.   Skin:     General: Skin is warm and dry.      Capillary Refill: Capillary refill takes less than 2 seconds.      Findings: No bruising, erythema or rash.   Neurological:      Mental Status: She is alert and oriented to person, place, and time.      Cranial Nerves: No cranial nerve deficit.      Sensory: No sensory deficit.      Motor: No abnormal muscle tone.   Psychiatric:         Mood and Affect: Mood normal.         Behavior: Behavior normal.         Thought Content: Thought content normal.         Judgment: Judgment normal.       I have reexamined the patient and the results are consistent with the previously documented exam. Мария Nunez MD     RECENT LABS    WBC   Date Value Ref Range Status   03/22/2022 4.88 3.40 - 10.80 10*3/mm3 Final     RBC   Date Value Ref Range Status   03/22/2022 4.57 3.77 - 5.28 10*6/mm3 Final     Hemoglobin   Date Value Ref Range Status   03/22/2022 13.3 12.0 - 15.9 g/dL Final      Hematocrit   Date Value Ref Range Status   03/22/2022 42.1 34.0 - 46.6 % Final     MCV   Date Value Ref Range Status   03/22/2022 92.1 79.0 - 97.0 fL Final     MCH   Date Value Ref Range Status   03/22/2022 29.1 26.6 - 33.0 pg Final     MCHC   Date Value Ref Range Status   03/22/2022 31.6 31.5 - 35.7 g/dL Final     RDW   Date Value Ref Range Status   03/22/2022 16.9 (H) 12.3 - 15.4 % Final     RDW-SD   Date Value Ref Range Status   03/22/2022 53.6 37.0 - 54.0 fl Final     MPV   Date Value Ref Range Status   03/22/2022 13.0 (H) 6.0 - 12.0 fL Final     Platelets   Date Value Ref Range Status   03/22/2022 120 (L) 140 - 450 10*3/mm3 Final     Neutrophil %   Date Value Ref Range Status   03/22/2022 72.2 42.7 - 76.0 % Final     Lymphocyte %   Date Value Ref Range Status   03/22/2022 21.1 19.6 - 45.3 % Final     Monocyte %   Date Value Ref Range Status   03/22/2022 4.5 (L) 5.0 - 12.0 % Final     Eosinophil %   Date Value Ref Range Status   03/22/2022 1.8 0.3 - 6.2 % Final     Basophil %   Date Value Ref Range Status   03/22/2022 0.4 0.0 - 1.5 % Final     Immature Grans %   Date Value Ref Range Status   07/20/2020 0.7 (H) 0.0 - 0.5 % Final     Neutrophils, Absolute   Date Value Ref Range Status   03/22/2022 3.52 1.70 - 7.00 10*3/mm3 Final     Lymphocytes, Absolute   Date Value Ref Range Status   03/22/2022 1.03 0.70 - 3.10 10*3/mm3 Final     Monocytes, Absolute   Date Value Ref Range Status   03/22/2022 0.22 0.10 - 0.90 10*3/mm3 Final     Eosinophils, Absolute   Date Value Ref Range Status   03/22/2022 0.09 0.00 - 0.40 10*3/mm3 Final     Basophils, Absolute   Date Value Ref Range Status   03/22/2022 0.02 0.00 - 0.20 10*3/mm3 Final     Immature Grans, Absolute   Date Value Ref Range Status   07/20/2020 0.05 0.00 - 0.05 10*3/mm3 Final     nRBC   Date Value Ref Range Status   09/01/2021 0.4 (H) 0.0 - 0.2 /100 WBC Final       Lab Results   Component Value Date    GLUCOSE 238 (H) 12/20/2021    BUN 18 12/20/2021    CREATININE  0.83 12/20/2021    EGFRIFNONA 70 12/20/2021    EGFRIFAFRI >60 10/12/2018    BCR 21.7 12/20/2021    K 4.1 12/20/2021    CO2 24.0 12/20/2021    CALCIUM 9.2 12/20/2021    PROTENTOTREF 7.4 12/14/2020    ALBUMIN 3.90 12/20/2021    LABIL2 0.9 12/14/2020    AST 21 12/20/2021    ALT 14 12/20/2021       ASSESSMENT:    · Metastatic breast cancer presenting as 1.1 cm mixed lytic/sclerotic hypermetabolic lesion in the left hemisacrum suspicious for metastatic disease 1.2 cm sclerotic lesion on L4, small L3 lesion and T11 lesion.  Tumor is ER positive, MD positive and HER-2/bishop negative.  Patient is currently on aromatase inhibitor, I have therefore recommended addition of CDK 4 inhibitor with Ibrance.  She will also benefit from biphosphonate therapy to prevent skeletal fractures.  Will discontinue Prolia and begin Xgeva as well.  Reviewed the side effects of Ibrance in detail with patient to include but not limited to fatigue, hepatic function abnormalities, interstitial lung disease, pancytopenia.  She is currently on combination of Ibrance, Arimidex and (Xgeva to be started once her dental procedure has been completed).  Her scans and tumor markers had suggest that she is responding to treatment. I have reviewed restaging CT scan of the chest, abdomen and pelvis completed 11/3/2021. There is no clear evidence of progressive disease.  Reviewed her bone scan  · Bone metastasis: Patient is now ready to begin Xgeva.  We will go ahead and order  · Pancytopenia secondary to Ibrance: Reviewed her CBC  · Ongoing back pain issues: Reviewed her bone scan . MRI of the lumbar and pelvis area. We will also give patient referral to pain management with Dr. Hunter.  Continue Percocet to 10 mg every 4-6 hours as needed for pain  · ypT2 N1 aM0 status post left modified radical mastectomy with lymph node dissection and right prophylactic mastectomy in 2017 performed at [facility].  ER positive, MD positive and HER-2/bishop  negative.  Status post bilateral chest wall reconstruction.  According to patient, she tolerated Arimidex very well except that she also had osteoporosis therefore Arimidex was discontinued at that time  · Intolerance of tamoxifen in the past  · Osteoporosis: She was on Prolia: We will transition to Xgeva since she has bone metastases  · Status post neoadjuvant Arimidex prior to bilateral mastectomies  · Thrombocytopenia: Work-up was - December 2020  · Neck pain status post cervical spine surgery: Resolved  · Complex cardiac medical history including tetralogy of Fallot, coarctation of aorta, VSD status post repair.  Status post stent placement for coarctation of aorta  · Personal history and strong family history of breast cancer in multiple in the relatives on paternal side of the family including 4 paternal aunts in their 30s and 40s and 2 maternal aunts at age of 20s and 50s.  There is concern for possible hereditary breast cancer syndrome.  Patient may be  interested in pursuing cancer genetics for her management.  · Assessment has been reviewed and updated          Discussion    Patient has metastatic breast cancer estrogen and progesterone dependent and HER-2/bishop negative.  She is currently on Arimidex.  We will add Ibrance.  We will also discontinue Prolia and begin Xgeva to help reduce skeletal events.           Plans:     · CT scan of the chest, abdomen and pelvis for cancer restaging due in June 2022.  Reviewed with patient  · Reviewed results of her bone scan  · MRI of the lumbar and pelvis still pending, not done  · CMP today  · Follow-up with pain management for ongoing pain issues  · Continue Percocet 10 mg p.o. every 4 hours as needed for pain #40  · Referred to pain management with Dr. Hunter, patient encouraged to follow-up  · Refill Ibrance, continue Arimidex, calcium with vitamin D  · Follow-up with /counselor   · Reviewed work-up for thrombocytopenia which was negative  · Reviewed  her bone density which showed osteoporosis  · Schedule Xgeva 120 mg subcu monthly  · All questions answered  · Follow-up in  weeks or earlier as needed for problems  · All questions answered            Patient verbalized understanding and is in agreement of the above plan.              I spent 40 total minutes, face-to-face, caring for Gladys today.  90% of this time involved counseling and/or coordination of care as documented within this note.

## 2022-03-22 ENCOUNTER — LAB (OUTPATIENT)
Dept: LAB | Facility: HOSPITAL | Age: 62
End: 2022-03-22

## 2022-03-22 ENCOUNTER — OFFICE VISIT (OUTPATIENT)
Dept: ONCOLOGY | Facility: CLINIC | Age: 62
End: 2022-03-22

## 2022-03-22 VITALS — WEIGHT: 159 LBS | RESPIRATION RATE: 18 BRPM | TEMPERATURE: 97.1 F | BODY MASS INDEX: 30.02 KG/M2 | HEIGHT: 61 IN

## 2022-03-22 DIAGNOSIS — C50.919 MALIGNANT NEOPLASM OF FEMALE BREAST, UNSPECIFIED ESTROGEN RECEPTOR STATUS, UNSPECIFIED LATERALITY, UNSPECIFIED SITE OF BREAST: Primary | ICD-10-CM

## 2022-03-22 LAB
BASOPHILS # BLD AUTO: 0.02 10*3/MM3 (ref 0–0.2)
BASOPHILS NFR BLD AUTO: 0.4 % (ref 0–1.5)
DEPRECATED RDW RBC AUTO: 53.6 FL (ref 37–54)
EOSINOPHIL # BLD AUTO: 0.09 10*3/MM3 (ref 0–0.4)
EOSINOPHIL NFR BLD AUTO: 1.8 % (ref 0.3–6.2)
ERYTHROCYTE [DISTWIDTH] IN BLOOD BY AUTOMATED COUNT: 16.9 % (ref 12.3–15.4)
HCT VFR BLD AUTO: 42.1 % (ref 34–46.6)
HGB BLD-MCNC: 13.3 G/DL (ref 12–15.9)
HOLD SPECIMEN: NORMAL
HOLD SPECIMEN: NORMAL
LYMPHOCYTES # BLD AUTO: 1.03 10*3/MM3 (ref 0.7–3.1)
LYMPHOCYTES NFR BLD AUTO: 21.1 % (ref 19.6–45.3)
MCH RBC QN AUTO: 29.1 PG (ref 26.6–33)
MCHC RBC AUTO-ENTMCNC: 31.6 G/DL (ref 31.5–35.7)
MCV RBC AUTO: 92.1 FL (ref 79–97)
MONOCYTES # BLD AUTO: 0.22 10*3/MM3 (ref 0.1–0.9)
MONOCYTES NFR BLD AUTO: 4.5 % (ref 5–12)
NEUTROPHILS NFR BLD AUTO: 3.52 10*3/MM3 (ref 1.7–7)
NEUTROPHILS NFR BLD AUTO: 72.2 % (ref 42.7–76)
PLATELET # BLD AUTO: 120 10*3/MM3 (ref 140–450)
PMV BLD AUTO: 13 FL (ref 6–12)
RBC # BLD AUTO: 4.57 10*6/MM3 (ref 3.77–5.28)
WBC NRBC COR # BLD: 4.88 10*3/MM3 (ref 3.4–10.8)

## 2022-03-22 PROCEDURE — 85025 COMPLETE CBC W/AUTO DIFF WBC: CPT

## 2022-03-22 PROCEDURE — 99215 OFFICE O/P EST HI 40 MIN: CPT | Performed by: INTERNAL MEDICINE

## 2022-03-22 PROCEDURE — 36415 COLL VENOUS BLD VENIPUNCTURE: CPT

## 2022-03-23 ENCOUNTER — SPECIALTY PHARMACY (OUTPATIENT)
Dept: PHARMACY | Facility: HOSPITAL | Age: 62
End: 2022-03-23

## 2022-03-23 ENCOUNTER — TELEPHONE (OUTPATIENT)
Dept: PAIN MEDICINE | Facility: CLINIC | Age: 62
End: 2022-03-23

## 2022-03-23 DIAGNOSIS — G89.3 CANCER ASSOCIATED PAIN: Primary | ICD-10-CM

## 2022-03-23 NOTE — PROGRESS NOTES
Specialty Pharmacy Note: Ibrance        3/22/2022   WBC 3.40 - 10.80 10*3/mm3 4.88   Neutrophils Absolute 1.70 - 7.00 10*3/mm3 3.52   Hemoglobin 12.0 - 15.9 g/dL 13.3   Hematocrit 34.0 - 46.6 % 42.1   Platelets 140 - 450 10*3/mm3 120 (A)     Thanks,    Eda Her, PharmD

## 2022-03-24 PROBLEM — G89.3 CANCER ASSOCIATED PAIN: Status: ACTIVE | Noted: 2022-03-24

## 2022-03-28 DIAGNOSIS — C50.919 MALIGNANT NEOPLASM OF FEMALE BREAST, UNSPECIFIED ESTROGEN RECEPTOR STATUS, UNSPECIFIED LATERALITY, UNSPECIFIED SITE OF BREAST: ICD-10-CM

## 2022-03-28 DIAGNOSIS — C79.51 SPINE METASTASIS: ICD-10-CM

## 2022-03-28 RX ORDER — IBUPROFEN 400 MG/1
400 TABLET ORAL AS NEEDED
COMMUNITY

## 2022-03-28 RX ORDER — SENNOSIDES 8.6 MG
650 CAPSULE ORAL AS NEEDED
COMMUNITY

## 2022-03-28 RX ORDER — OXYCODONE AND ACETAMINOPHEN 10; 325 MG/1; MG/1
1 TABLET ORAL EVERY 4 HOURS PRN
Qty: 20 TABLET | Refills: 0 | Status: SHIPPED | OUTPATIENT
Start: 2022-03-28 | End: 2022-04-05 | Stop reason: HOSPADM

## 2022-03-28 NOTE — TELEPHONE ENCOUNTER
Rx Refill Note  Requested Prescriptions     Pending Prescriptions Disp Refills   • oxyCODONE-acetaminophen (PERCOCET)  MG per tablet 20 tablet 0     Sig: Take 1 tablet by mouth Every 4 (Four) Hours As Needed for Moderate Pain  or Severe Pain . Take twice daily routinely      Last office visit with prescribing clinician: 1/10/2022      Next office visit with prescribing clinician: Visit date not found            Bernadette Mijares MA  03/28/22, 14:51 EDT

## 2022-03-29 ENCOUNTER — HOSPITAL ENCOUNTER (OUTPATIENT)
Dept: CARDIOLOGY | Facility: HOSPITAL | Age: 62
Discharge: HOME | End: 2022-03-29

## 2022-03-29 ENCOUNTER — LAB (OUTPATIENT)
Dept: LAB | Facility: HOSPITAL | Age: 62
End: 2022-03-29

## 2022-03-29 LAB
ANION GAP SERPL CALCULATED.3IONS-SCNC: 11.3 MMOL/L (ref 5–15)
BUN SERPL-MCNC: 17 MG/DL (ref 6–20)
BUN/CREAT SERPL: 18.9 (ref 7–25)
CALCIUM SPEC-SCNC: 9.6 MG/DL (ref 8.6–10.5)
CHLORIDE SERPL-SCNC: 105 MMOL/L (ref 98–107)
CO2 SERPL-SCNC: 23.7 MMOL/L (ref 22–29)
CREAT SERPL-MCNC: 0.9 MG/DL (ref 0.57–1)
EGFRCR SERPLBLD CKD-EPI 2021: 73.8 ML/MIN/1.73
GLUCOSE SERPL-MCNC: 48 MG/DL (ref 65–99)
POTASSIUM SERPL-SCNC: 4.4 MMOL/L (ref 3.5–5.2)
SODIUM SERPL-SCNC: 140 MMOL/L (ref 136–145)

## 2022-03-29 PROCEDURE — 80048 BASIC METABOLIC PNL TOTAL CA: CPT

## 2022-03-29 PROCEDURE — 93005 ELECTROCARDIOGRAM TRACING: CPT | Performed by: STUDENT IN AN ORGANIZED HEALTH CARE EDUCATION/TRAINING PROGRAM

## 2022-03-29 PROCEDURE — 93010 ELECTROCARDIOGRAM REPORT: CPT | Performed by: INTERNAL MEDICINE

## 2022-04-02 LAB — QT INTERVAL: 448 MS

## 2022-04-04 ENCOUNTER — LAB (OUTPATIENT)
Dept: LAB | Facility: HOSPITAL | Age: 62
End: 2022-04-04

## 2022-04-04 ENCOUNTER — TELEPHONE (OUTPATIENT)
Dept: PAIN MEDICINE | Facility: CLINIC | Age: 62
End: 2022-04-04

## 2022-04-04 ENCOUNTER — ANESTHESIA EVENT (OUTPATIENT)
Dept: PERIOP | Facility: HOSPITAL | Age: 62
End: 2022-04-04

## 2022-04-04 LAB — SARS-COV-2 ORF1AB RESP QL NAA+PROBE: NOT DETECTED

## 2022-04-04 PROCEDURE — U0004 COV-19 TEST NON-CDC HGH THRU: HCPCS

## 2022-04-04 PROCEDURE — C9803 HOPD COVID-19 SPEC COLLECT: HCPCS

## 2022-04-04 NOTE — TELEPHONE ENCOUNTER
[facility] FAXED MORPHINE RX TO [facility].  I SPOKE TO PHARMACIST, KENNY. THEY WILL MIX MORPHINE TODAY AND HAVE IT DELIVERED TO [facility] THIS AFTERNOON.

## 2022-04-05 ENCOUNTER — APPOINTMENT (OUTPATIENT)
Dept: GENERAL RADIOLOGY | Facility: HOSPITAL | Age: 62
End: 2022-04-05

## 2022-04-05 ENCOUNTER — HOSPITAL ENCOUNTER (OUTPATIENT)
Facility: HOSPITAL | Age: 62
Setting detail: HOSPITAL OUTPATIENT SURGERY
Discharge: HOME | End: 2022-04-05
Attending: STUDENT IN AN ORGANIZED HEALTH CARE EDUCATION/TRAINING PROGRAM | Admitting: STUDENT IN AN ORGANIZED HEALTH CARE EDUCATION/TRAINING PROGRAM

## 2022-04-05 ENCOUNTER — ANESTHESIA (OUTPATIENT)
Dept: PERIOP | Facility: HOSPITAL | Age: 62
End: 2022-04-05

## 2022-04-05 VITALS
HEIGHT: 61 IN | RESPIRATION RATE: 18 BRPM | BODY MASS INDEX: 30.59 KG/M2 | WEIGHT: 162.04 LBS | TEMPERATURE: 97.1 F | OXYGEN SATURATION: 96 % | HEART RATE: 72 BPM | DIASTOLIC BLOOD PRESSURE: 70 MMHG | SYSTOLIC BLOOD PRESSURE: 177 MMHG

## 2022-04-05 DIAGNOSIS — G89.3 CANCER ASSOCIATED PAIN: ICD-10-CM

## 2022-04-05 DIAGNOSIS — C50.919 MALIGNANT NEOPLASM OF FEMALE BREAST, UNSPECIFIED ESTROGEN RECEPTOR STATUS, UNSPECIFIED LATERALITY, UNSPECIFIED SITE OF BREAST: ICD-10-CM

## 2022-04-05 DIAGNOSIS — C79.51 SPINE METASTASIS: ICD-10-CM

## 2022-04-05 LAB
GLUCOSE BLDC GLUCOMTR-MCNC: 62 MG/DL (ref 70–105)
GLUCOSE BLDC GLUCOMTR-MCNC: 88 MG/DL (ref 70–105)
GLUCOSE BLDC GLUCOMTR-MCNC: 89 MG/DL (ref 70–105)
GLUCOSE BLDC GLUCOMTR-MCNC: 91 MG/DL (ref 70–105)
GLUCOSE BLDC GLUCOMTR-MCNC: 96 MG/DL (ref 70–105)

## 2022-04-05 PROCEDURE — 62362 IMPLANT SPINE INFUSION PUMP: CPT | Performed by: STUDENT IN AN ORGANIZED HEALTH CARE EDUCATION/TRAINING PROGRAM

## 2022-04-05 PROCEDURE — 25010000002 NEOSTIGMINE 5 MG/5ML SOLUTION: Performed by: ANESTHESIOLOGIST ASSISTANT

## 2022-04-05 PROCEDURE — 25010000002 PROPOFOL 200 MG/20ML EMULSION: Performed by: ANESTHESIOLOGIST ASSISTANT

## 2022-04-05 PROCEDURE — 25010000002 ONDANSETRON PER 1 MG: Performed by: ANESTHESIOLOGIST ASSISTANT

## 2022-04-05 PROCEDURE — 82962 GLUCOSE BLOOD TEST: CPT

## 2022-04-05 PROCEDURE — 25010000002 MIDAZOLAM PER 1 MG: Performed by: ANESTHESIOLOGIST ASSISTANT

## 2022-04-05 PROCEDURE — 25010000002 PHENYLEPHRINE 10 MG/ML SOLUTION 5 ML VIAL: Performed by: ANESTHESIOLOGIST ASSISTANT

## 2022-04-05 PROCEDURE — C1772 INFUSION PUMP, PROGRAMMABLE: HCPCS | Performed by: STUDENT IN AN ORGANIZED HEALTH CARE EDUCATION/TRAINING PROGRAM

## 2022-04-05 PROCEDURE — C1787 PATIENT PROGR, NEUROSTIM: HCPCS | Performed by: STUDENT IN AN ORGANIZED HEALTH CARE EDUCATION/TRAINING PROGRAM

## 2022-04-05 PROCEDURE — 25010000002 VANCOMYCIN 1 G RECONSTITUTED SOLUTION: Performed by: STUDENT IN AN ORGANIZED HEALTH CARE EDUCATION/TRAINING PROGRAM

## 2022-04-05 PROCEDURE — 25010000002 DEXAMETHASONE PER 1 MG: Performed by: ANESTHESIOLOGIST ASSISTANT

## 2022-04-05 PROCEDURE — 76000 FLUOROSCOPY <1 HR PHYS/QHP: CPT

## 2022-04-05 PROCEDURE — C1755 CATHETER, INTRASPINAL: HCPCS | Performed by: STUDENT IN AN ORGANIZED HEALTH CARE EDUCATION/TRAINING PROGRAM

## 2022-04-05 PROCEDURE — S0260 H&P FOR SURGERY: HCPCS | Performed by: STUDENT IN AN ORGANIZED HEALTH CARE EDUCATION/TRAINING PROGRAM

## 2022-04-05 PROCEDURE — 62350 IMPLANT SPINAL CANAL CATH: CPT | Performed by: STUDENT IN AN ORGANIZED HEALTH CARE EDUCATION/TRAINING PROGRAM

## 2022-04-05 PROCEDURE — 25010000002 FENTANYL CITRATE (PF) 100 MCG/2ML SOLUTION: Performed by: ANESTHESIOLOGIST ASSISTANT

## 2022-04-05 PROCEDURE — 72100 X-RAY EXAM L-S SPINE 2/3 VWS: CPT

## 2022-04-05 PROCEDURE — 25010000002 VANCOMYCIN 1 G RECONSTITUTED SOLUTION 1 EACH VIAL: Performed by: STUDENT IN AN ORGANIZED HEALTH CARE EDUCATION/TRAINING PROGRAM

## 2022-04-05 DEVICE — CATH INTRATHCL ASCENDA SHRT 1PC16G114.3: Type: IMPLANTABLE DEVICE | Site: BACK | Status: FUNCTIONAL

## 2022-04-05 DEVICE — PUMP NEURO PROGRAM SYNCHROMED2 FLTR 20ML: Type: IMPLANTABLE DEVICE | Site: BACK | Status: FUNCTIONAL

## 2022-04-05 RX ORDER — NEOSTIGMINE METHYLSULFATE 5 MG/5 ML
SYRINGE (ML) INTRAVENOUS AS NEEDED
Status: DISCONTINUED | OUTPATIENT
Start: 2022-04-05 | End: 2022-04-05 | Stop reason: SURG

## 2022-04-05 RX ORDER — NALOXONE HYDROCHLORIDE 4 MG/.1ML
1 SPRAY NASAL AS NEEDED
Qty: 1 EACH | Refills: 0 | Status: SHIPPED | OUTPATIENT
Start: 2022-04-05 | End: 2022-05-19

## 2022-04-05 RX ORDER — MIDAZOLAM HYDROCHLORIDE 1 MG/ML
INJECTION INTRAMUSCULAR; INTRAVENOUS AS NEEDED
Status: DISCONTINUED | OUTPATIENT
Start: 2022-04-05 | End: 2022-04-05 | Stop reason: SURG

## 2022-04-05 RX ORDER — LABETALOL HYDROCHLORIDE 5 MG/ML
5 INJECTION, SOLUTION INTRAVENOUS
Status: DISCONTINUED | OUTPATIENT
Start: 2022-04-05 | End: 2022-04-05 | Stop reason: HOSPADM

## 2022-04-05 RX ORDER — OXYCODONE AND ACETAMINOPHEN 10; 325 MG/1; MG/1
1 TABLET ORAL EVERY 8 HOURS PRN
Qty: 21 TABLET | Refills: 0 | Status: SHIPPED | OUTPATIENT
Start: 2022-04-05 | End: 2022-05-02

## 2022-04-05 RX ORDER — ONDANSETRON 2 MG/ML
4 INJECTION INTRAMUSCULAR; INTRAVENOUS ONCE AS NEEDED
Status: DISCONTINUED | OUTPATIENT
Start: 2022-04-05 | End: 2022-04-05 | Stop reason: HOSPADM

## 2022-04-05 RX ORDER — DEXAMETHASONE SODIUM PHOSPHATE 4 MG/ML
INJECTION, SOLUTION INTRA-ARTICULAR; INTRALESIONAL; INTRAMUSCULAR; INTRAVENOUS; SOFT TISSUE AS NEEDED
Status: DISCONTINUED | OUTPATIENT
Start: 2022-04-05 | End: 2022-04-05 | Stop reason: SURG

## 2022-04-05 RX ORDER — PROPOFOL 10 MG/ML
INJECTION, EMULSION INTRAVENOUS AS NEEDED
Status: DISCONTINUED | OUTPATIENT
Start: 2022-04-05 | End: 2022-04-05 | Stop reason: SURG

## 2022-04-05 RX ORDER — LIDOCAINE HYDROCHLORIDE AND EPINEPHRINE 10; 10 MG/ML; UG/ML
INJECTION, SOLUTION INFILTRATION; PERINEURAL AS NEEDED
Status: DISCONTINUED | OUTPATIENT
Start: 2022-04-05 | End: 2022-04-05 | Stop reason: HOSPADM

## 2022-04-05 RX ORDER — VANCOMYCIN HYDROCHLORIDE 1 G/20ML
INJECTION, POWDER, LYOPHILIZED, FOR SOLUTION INTRAVENOUS AS NEEDED
Status: DISCONTINUED | OUTPATIENT
Start: 2022-04-05 | End: 2022-04-05 | Stop reason: HOSPADM

## 2022-04-05 RX ORDER — GLYCOPYRROLATE 1 MG/5 ML
SYRINGE (ML) INTRAVENOUS AS NEEDED
Status: DISCONTINUED | OUTPATIENT
Start: 2022-04-05 | End: 2022-04-05 | Stop reason: SURG

## 2022-04-05 RX ORDER — FENTANYL CITRATE 50 UG/ML
25 INJECTION, SOLUTION INTRAMUSCULAR; INTRAVENOUS
Status: DISCONTINUED | OUTPATIENT
Start: 2022-04-05 | End: 2022-04-05 | Stop reason: HOSPADM

## 2022-04-05 RX ORDER — LIDOCAINE HYDROCHLORIDE 20 MG/ML
INJECTION, SOLUTION EPIDURAL; INFILTRATION; INTRACAUDAL; PERINEURAL AS NEEDED
Status: DISCONTINUED | OUTPATIENT
Start: 2022-04-05 | End: 2022-04-05 | Stop reason: SURG

## 2022-04-05 RX ORDER — ACETAMINOPHEN 650 MG/1
650 SUPPOSITORY RECTAL ONCE AS NEEDED
Status: DISCONTINUED | OUTPATIENT
Start: 2022-04-05 | End: 2022-04-05 | Stop reason: HOSPADM

## 2022-04-05 RX ORDER — MEPERIDINE HYDROCHLORIDE 25 MG/ML
12.5 INJECTION INTRAMUSCULAR; INTRAVENOUS; SUBCUTANEOUS
Status: DISCONTINUED | OUTPATIENT
Start: 2022-04-05 | End: 2022-04-05 | Stop reason: HOSPADM

## 2022-04-05 RX ORDER — DEXTROSE MONOHYDRATE 25 G/50ML
12.5 INJECTION, SOLUTION INTRAVENOUS ONCE
Status: COMPLETED | OUTPATIENT
Start: 2022-04-05 | End: 2022-04-05

## 2022-04-05 RX ORDER — ACETAMINOPHEN 325 MG/1
650 TABLET ORAL ONCE AS NEEDED
Status: DISCONTINUED | OUTPATIENT
Start: 2022-04-05 | End: 2022-04-05 | Stop reason: HOSPADM

## 2022-04-05 RX ORDER — SODIUM CHLORIDE, SODIUM LACTATE, POTASSIUM CHLORIDE, CALCIUM CHLORIDE 600; 310; 30; 20 MG/100ML; MG/100ML; MG/100ML; MG/100ML
INJECTION, SOLUTION INTRAVENOUS CONTINUOUS PRN
Status: DISCONTINUED | OUTPATIENT
Start: 2022-04-05 | End: 2022-04-05 | Stop reason: SURG

## 2022-04-05 RX ORDER — ROCURONIUM BROMIDE 10 MG/ML
INJECTION, SOLUTION INTRAVENOUS AS NEEDED
Status: DISCONTINUED | OUTPATIENT
Start: 2022-04-05 | End: 2022-04-05 | Stop reason: SURG

## 2022-04-05 RX ORDER — FENTANYL CITRATE 50 UG/ML
INJECTION, SOLUTION INTRAMUSCULAR; INTRAVENOUS AS NEEDED
Status: DISCONTINUED | OUTPATIENT
Start: 2022-04-05 | End: 2022-04-05 | Stop reason: SURG

## 2022-04-05 RX ORDER — ONDANSETRON 2 MG/ML
INJECTION INTRAMUSCULAR; INTRAVENOUS AS NEEDED
Status: DISCONTINUED | OUTPATIENT
Start: 2022-04-05 | End: 2022-04-05 | Stop reason: SURG

## 2022-04-05 RX ORDER — DIPHENHYDRAMINE HYDROCHLORIDE 50 MG/ML
12.5 INJECTION INTRAMUSCULAR; INTRAVENOUS
Status: DISCONTINUED | OUTPATIENT
Start: 2022-04-05 | End: 2022-04-05 | Stop reason: HOSPADM

## 2022-04-05 RX ORDER — BUPIVACAINE HYDROCHLORIDE 5 MG/ML
INJECTION, SOLUTION PERINEURAL AS NEEDED
Status: DISCONTINUED | OUTPATIENT
Start: 2022-04-05 | End: 2022-04-05 | Stop reason: HOSPADM

## 2022-04-05 RX ORDER — PHENYLEPHRINE HCL IN 0.9% NACL 1 MG/10 ML
SYRINGE (ML) INTRAVENOUS AS NEEDED
Status: DISCONTINUED | OUTPATIENT
Start: 2022-04-05 | End: 2022-04-05 | Stop reason: SURG

## 2022-04-05 RX ORDER — HYDRALAZINE HYDROCHLORIDE 20 MG/ML
5 INJECTION INTRAMUSCULAR; INTRAVENOUS
Status: DISCONTINUED | OUTPATIENT
Start: 2022-04-05 | End: 2022-04-05 | Stop reason: HOSPADM

## 2022-04-05 RX ADMIN — Medication 3 MG: at 10:53

## 2022-04-05 RX ADMIN — DEXAMETHASONE SODIUM PHOSPHATE 4 MG: 4 INJECTION, SOLUTION INTRAMUSCULAR; INTRAVENOUS at 08:58

## 2022-04-05 RX ADMIN — CEFAZOLIN SODIUM 2 G: 1 INJECTION, POWDER, FOR SOLUTION INTRAMUSCULAR; INTRAVENOUS at 08:31

## 2022-04-05 RX ADMIN — FENTANYL CITRATE 50 MCG: 50 INJECTION INTRAMUSCULAR; INTRAVENOUS at 08:25

## 2022-04-05 RX ADMIN — Medication 100 MCG: at 08:36

## 2022-04-05 RX ADMIN — LIDOCAINE HYDROCHLORIDE 100 MG: 20 INJECTION, SOLUTION EPIDURAL; INFILTRATION; INTRACAUDAL; PERINEURAL at 08:25

## 2022-04-05 RX ADMIN — SODIUM CHLORIDE, SODIUM LACTATE, POTASSIUM CHLORIDE, AND CALCIUM CHLORIDE: .6; .31; .03; .02 INJECTION, SOLUTION INTRAVENOUS at 08:21

## 2022-04-05 RX ADMIN — ROCURONIUM BROMIDE 40 MG: 10 SOLUTION INTRAVENOUS at 08:25

## 2022-04-05 RX ADMIN — ROCURONIUM BROMIDE 10 MG: 10 SOLUTION INTRAVENOUS at 08:39

## 2022-04-05 RX ADMIN — PHENYLEPHRINE HYDROCHLORIDE 0.5 MCG/KG/MIN: 10 INJECTION INTRAVENOUS at 08:36

## 2022-04-05 RX ADMIN — DEXTROSE MONOHYDRATE 13 ML: 25 INJECTION, SOLUTION INTRAVENOUS at 07:09

## 2022-04-05 RX ADMIN — MIDAZOLAM 1 MG: 1 INJECTION INTRAMUSCULAR; INTRAVENOUS at 08:36

## 2022-04-05 RX ADMIN — FENTANYL CITRATE 50 MCG: 50 INJECTION INTRAMUSCULAR; INTRAVENOUS at 09:39

## 2022-04-05 RX ADMIN — ONDANSETRON 4 MG: 2 INJECTION INTRAMUSCULAR; INTRAVENOUS at 10:02

## 2022-04-05 RX ADMIN — Medication 0.6 MG: at 10:53

## 2022-04-05 RX ADMIN — PROPOFOL 160 MG: 10 INJECTION, EMULSION INTRAVENOUS at 08:25

## 2022-04-05 RX ADMIN — MIDAZOLAM 1 MG: 1 INJECTION INTRAMUSCULAR; INTRAVENOUS at 08:25

## 2022-04-05 NOTE — DISCHARGE INSTRUCTIONS
Keep dressings intact for 1 week following surgery    Follow-up in 1 week to check incisions    No bending, lifting, twisting for 1 week following surgery    No showers or baths for 1 week post-op    Take pain medication as prescribed for post-op pain    Use narcan spray if overdose occurs

## 2022-04-05 NOTE — H&P
[facility]   HISTORY AND PHYSICAL    Patient Name: Gladys Garrison  : 1960  MRN: [id number]  Primary Care Physician:  Angelica Rodriguez MD  Date of admission: 2022    Subjective   Subjective     Chief Complaint: Chronic pain syndrome, Cancer pain     History of Present Illness  Gladys Garrison is a 61 y.o. female  who presents for cancer-related pain.  She has a significant history of breast cancer which is unfortunately metastatic.  She states she has lesions on her ribs as well as in her low back.  She describes pain in her low back which radiates in her legs as well as pain in her chest and side from reported rib fractures.  She is very hesitant to take any narcotic pain medication because she does not want to become dependent on medication.  She also has significant concerns regarding escalating pain requirements and inability to meet the requirements that she starts on narcotic pain medication.  She states in the past, she had excellent results with steroid injections for her shoulder knee pain.  She was referred by her oncologist for further management and nonnarcotic options.  After discussion, she elected to undergo intrathecal drug delivery.  She had a successful pain pump trial with 25mcg fentanyl and wishes to pursue with implant.    Review of Systems   Cardiovascular: Positive for chest pain.   Genitourinary: Positive for flank pain.   Musculoskeletal: Positive for arthralgias, back pain, gait problem, myalgias and neck pain.     Personal History     Past Medical History:   Diagnosis Date   • Allergic    • Bone cancer (HCC)     METASTATIC BONE   • Bradycardia     SECONDARY TO ABLATION   • Breast cancer (HCC) 2017    mets to lymph nodes; did not do radiation   • Cancer of unknown origin (HCC)    • Compression fx, thoracic spine, open, initial encounter (HCC)    • Coronary artery disease    • COVID-19 2021   • Diabetes mellitus (HCC)    • Heart disease, unspecified    • Hepatitis  C     RESOLVED WITH MEDICATION   • Hyperlipidemia    • Hypertension    • Obesity (BMI 30-39.9) 2021   • Sleep apnea     no machine   • Tachycardia     ATRIAL   • Type 2 diabetes mellitus (HCC) 2017       Past Surgical History:   Procedure Laterality Date   • BACK SURGERY      neck X 2   • BREAST RECONSTRUCTION Bilateral    • CARDIAC ABLATION       atrial tachycardia x 5 ablations    • CARDIAC CATHETERIZATION     • CARDIAC SURGERY      stent placed in aorta   • CARDIAC SURGERY      6 surgeries as baby    • CERVICAL FUSION ANTERIOR WITH ARTIFICIAL DISCECTOMY IMPLANTATION N/A 2020    Procedure: C4 VERTEBRECTOMY AND ANTERIOR CERIVCAL DISCECTOMY WITH FUSION OF CERVICAL THREE THROUGH FIVE WITH REMOVAL OF HARDWARE C5-C6;  Surgeon: Mark Kaba MD;  Location: [facility] MAIN OR;  Service: Neurosurgery;  Laterality: N/A;   •  SECTION      x2   • COLONOSCOPY N/A 10/23/2020    Procedure: COLONOSCOPY WITH POLYPECTOMY X6;  Surgeon: Stanley Bourne MD;  Location: [facility] ENDOSCOPY;  Service: Gastroenterology;  Laterality: N/A;  POLYPS, INTERNAL HEMORRHOIDS   • ENDOMETRIAL ABLATION      REMOVAL SCAR TISSUE UTERINE   • ENDOSCOPY N/A 10/23/2020    Procedure: ESOPHAGOGASTRODUODENOSCOPY WITH BIOPSY X 1 AREA;  Surgeon: Stanley Bourne MD;  Location: [facility] ENDOSCOPY;  Service: Gastroenterology;  Laterality: N/A;  GASTRITIS, ESOPHAGITIS, HIATAL HERNIA   • KNEE SURGERY     • MASTECTOMY Bilateral    • NECK SURGERY     • PACEMAKER IMPLANTATION         Family History: family history includes Aneurysm in her father; Cancer in her maternal aunt; Diabetes in her sister; Diabetes type I in her half-sister; Heart attack in her sister; Heart disease in her mother; Lung cancer in her mother; No Known Problems in her brother, brother, and sister; Stroke in her mother; Thyroid cancer in her half-sister; Thyroid disease in her sister. Otherwise pertinent FHx was reviewed and not pertinent to current  issue.    Social History:  reports that she has never smoked. She has never used smokeless tobacco. She reports current alcohol use of about 1.0 standard drink of alcohol per week. She reports previous drug use.    Home Medications:  Accu-Chek Araceli, Calcium 600+D Plus Minerals, Palbociclib, accu-chek soft touch, acetaminophen, amLODIPine, anastrozole, aspirin, celecoxib, glucose blood, ibuprofen, insulin NPH-insulin regular, lisinopril, ondansetron, oxyCODONE-acetaminophen, and rosuvastatin    Allergies:  Allergies   Allergen Reactions   • Promethazine Other (See Comments)     Hyperactive mean   • Tape Other (See Comments)     .blisters         Objective    Objective     Vitals:   Temp:  [98 °F (36.7 °C)] 98 °F (36.7 °C)  Heart Rate:  [73] 73  Resp:  [10] 10  BP: (137)/(83) 137/83    Physical Exam  Vitals and nursing note reviewed. Exam conducted with a chaperone present.   Constitutional:       General: She is not in acute distress.     Appearance: Normal appearance. She is obese.   HENT:      Head: Normocephalic and atraumatic.   Pulmonary:      Effort: Pulmonary effort is normal.      Breath sounds: Normal breath sounds.   Abdominal:      Palpations: Abdomen is soft.      Tenderness: There is no abdominal tenderness.   Musculoskeletal:         General: Tenderness present. No swelling or deformity. Normal range of motion.      Cervical back: Normal range of motion. Tenderness present. No rigidity.   Skin:     General: Skin is warm and dry.      Findings: No lesion.   Neurological:      Mental Status: She is alert.      Sensory: No sensory deficit.      Motor: No weakness.         Result Review    Result Review:  I have personally reviewed the results from the time of this admission to 4/5/2022 07:48 EDT and agree with these findings:  []  Laboratory  []  Microbiology  [x]  Radiology  []  EKG/Telemetry   []  Cardiology/Vascular   []  Pathology  []  Old records  []  Other:  Most notable findings include: Bone scan  showing metastatic disease    Assessment/Plan   Assessment / Plan     Brief Patient Summary:  Gladys Garrison is a 61 y.o. female who presents with chronic pain syndrome and cancer related pain.  She had a successful intrathecal pain pump trial and wishes to undergo implant of an intrathecal pain pump    Active Hospital Problems:  Active Hospital Problems    Diagnosis    • **Cancer associated pain      Added automatically from request for surgery [id number]       Plan:   1. To OR today for intrathecal pump implant    DVT prophylaxis:  No DVT prophylaxis order currently exists.    CODE STATUS:       Admission Status:  I believe this patient meets outpatient status.    Chuck Hunter MD

## 2022-04-05 NOTE — BRIEF OP NOTE
PAIN PUMP INSERTION/REVISION  Progress Note    Gladys Garrison  4/5/2022    Pre-op Diagnosis:   Cancer associated pain [G89.3]       Post-Op Diagnosis Codes:     * Cancer associated pain [G89.3]    Procedure/CPT® Codes:  45510  97728      Procedure(s):  PAIN PUMP INSERTION AND INTRATHECAL CATHETER PLACEMENT    Surgeon(s):  Chuck Hunter MD    Anesthesia: General    Staff:   Circulator: Rufina Mckeon RN; Lauren Car RN  Radiology Technologist: Jodi Canales  Scrub Person: Kristen Puentes         Estimated Blood Loss: 40    Urine Voided: * No values recorded between 4/5/2022  8:20 AM and 4/5/2022 10:55 AM *    Specimens:                None          Drains: * No LDAs found *    Findings: Successful implantation of intrathecal catheter and pump    Complications: None          Chuck Hunter MD     Date: 4/5/2022  Time: 11:01 EDT

## 2022-04-05 NOTE — OP NOTE
Intrathecal Drug Delivery System Implant  [facility]      Preop Dx: Cancer associated pain, chronic pain syndrome  Postop Dx: Same as preop dx    CPT:  29056 (implant of catheter) & 00907 (implant of programmable pump)     Neuromodulation System: [employer]tronic  Product Implanted:   Synchromed II 20cc    Catheter...   entering into the L4-5 interspace, advanced to a point with the distal tip at the T7 vertebral level, in the intrathecal space    Pump pocket site: Right Lumbar Flank    Preprocedure Discussion with Patient:  Risks and complications were discussed with the patient prior to starting the procedure and informed consent was obtained.  We discussed various topics including but not limited to bleeding, infection, injury, allergic reactions, spinal cord and/or neural injury, implant site pain, post procedural site soreness, equipment malfunction including but not limited to catheter fracture or migration or risk of high or low levels of medication delivery, risk of overdose & coma & death, risk of the lack of pain relief, and risk of worsening clinical picture.    Surgeon:     Justin Hunter MD    Reason for procedure:  Cancer associated pain.      Sedation:  General Anesthesia with ETT  Antibiotics:   Cefazolin 2g IV immediately prior to incision    Description of Procedure:    After obtaining informed consent, IV access had been obtained by nursing staff in the preoperative area.  The patient was transported to the operative suite and was placed in a prone position.  Appropriate padding was placed.  Anesthesia care and cardiopulmonary monitoring maintained by member of the anesthesiology team throughout the procedure.  Perioperative antibodies were given prior to incision per routine as indicated in the anesthesia record and indicated above.  The patient's spine was cleansed appropriately with routine cleansing, and then prepped in a sterile routine fashion.  The area was then draped in sterile routine  fashion.       The midline of the patient's spine was identified and marked.  The skin and subcutaneous tissue were anesthetized with appropriate amounts of local anesthetic solution.  A midline incision was made starting about 1 vertebral level below the intended interspace as noted above, and extended 1 1/2-2 inches caudad.  The incision was extended down to the lumbodorsal fascia with judicious use of electrocautery.  The entire area was irrigated with copious amounts of solution.      Needle entry site was about 1 1/2-2 vertebral levels below the intended interlaminar space for epidural access.  The entry point was medial to the pedicles, and inserted at acute angles of less than 45° and in to the interlaminar space noted above.  The catheter was readied, and then the needle was advanced 1-2 more millimeters to puncture the dura.  The catheter was inserted in the needle as noted above to the aforementioned target.  Lateral fluoroscopic view was utilized to confirm proper placement in the intrathecal space.    The needle was removed slowly from the insertion site with care not to advance to retract catheter tip position.  AP fluoroscopic imaging was checked sequentially to confirm no gross changes in position had occurred.  The catheter was secured to the underlying tissue using a proprietary locking anchor and nonabsorbable sutures.  Once more in AP fluoroscopic view was checked to confirm final lead placement after anchoring was completed.  The incision site was irrigated with copious amounts of irrigant solution x2.    As noted above, the pump pocket site was identified.  This line was marked in a perpendicular fashion on the skin and then the area along the line was anesthetized with copious amounts of local anesthetic, and also anesthetized caudad and cephalad to that line.  Also was anesthetized a line connecting the medial aspect of the pump pocket to the inferior aspect of the midline incision.  The  incision was made to about 1 cm in depth and then undermined caudad and cephalad to create a subcutaneous pocket at a depth of about 3/4 cm.  Electrocautery was used judiciously to control bleeding, and the pocket was then irrigated with copious amounts of irrigant solution ×2.    The pump was primed per routine and prepared on the back table, observing strict sterile technique upon filling the pump.    The catheter was tunneled from the midline incision to the nearest aspect of the pump pocket using the proprietary tunneling device.   The catheter was trimmed as appropriate and the catheter length was noted in the pump programming.   The catheter was connected to the pump in routine fashion using the locking pump catheter extension, and locked in place.  Catheter function was tested with positive aspiration from the side port confirming free CSF flow, and was noted to be functioning appropriately.  Excess catheter lead length was coiled beneath the pump battery and the pump battery was placed into the pocket.  The skin and subcutaneous tissues were easily opposable without any stress or duress on the overlying tissues.  Both incisions were then irrigated a final time with vancomycin irrigation and vancomycin powder was placed in the wound.      The pump pocket incision was closed with 2-0 Vicryl interrupted sutures for the deep layer, 3-0 Vicryl running suture for the subcutaneous layer and 4-0 nylon interrupted for the skin layer.  The midline incision was closed with 2-0 Vicryl interrupted sutures for the deep layer, 3-0 Vicryl running suture for the subcutaneous layer and 4-0 monocryl running subcuticular.  The incisions were dressed with gauze with tape and a small amount of Dermabond.        Estimated Blood Loss: 40 cc  Specimens:   None  Complications:  No complications were noted.    Tolerance and discharge condition:    The patient tolerated the procedure well and was awakened from anesthesia without  incident.  The patient was transported to the recovery area without difficulties.  Further device testing and programming was performed by the device representative in the recovery area as necessary.  The patient was again cautioned not to drive at this time and avoid strenuous activities and to avoid reaching overhead and to avoid bending and avoid lifting objects over 5 pounds.  The patient was discharged home under the care of family members in stable and satisfactory condition.      Plan of care:    The patient was given our standard instruction sheet and will resume all medications as per the medication reconciliation sheet.  The patient will return to my office at the appropriate time scheduled in about one business day with the  device representative for further device programming and education about device function if necessary.  The patient will also present to my office in 10-14 days for a postoperative wound check.    The patient understands that there is any signs of complication, bleeding, infection, fever, chills, increasing back pain or spine pain, any neurologic deficit, or any other concerning finding, that the patient of a family member should call my office at [phone] at any time to access the answering service.      INITIAL DEVICE SETTINGS:  Drug: Morphine 1mg/ml  Daily Dose: 0.1mg/day  PTM: None  Max daily dose: 0.1mg/day  Refill 9/28/22

## 2022-04-05 NOTE — ANESTHESIA PROCEDURE NOTES
Airway  Urgency: elective    Date/Time: 4/5/2022 8:26 AM  End Time:4/5/2022 8:26 AM  Airway not difficult    General Information and Staff    Patient location during procedure: OR  Anesthesiologist: Andrew Gonzalez MD  CRNA: Yuliana Levine CAA    Indications and Patient Condition  Indications for airway management: airway protection    Preoxygenated: yes  MILS maintained throughout  Mask difficulty assessment: 1 - vent by mask    Final Airway Details  Final airway type: endotracheal airway      Successful airway: ETT  Cuffed: yes   Successful intubation technique: direct laryngoscopy  Facilitating devices/methods: intubating stylet  Endotracheal tube insertion site: oral  Blade: Kera  Blade size: 3  ETT size (mm): 7.0  Cormack-Lehane Classification: grade IIa - partial view of glottis  Placement verified by: chest auscultation and capnometry   Measured from: lips  ETT/EBT  to lips (cm): 21  Number of attempts at approach: 1  Assessment: lips, teeth, and gum same as pre-op and atraumatic intubation

## 2022-04-05 NOTE — ANESTHESIA PREPROCEDURE EVALUATION
Anesthesia Evaluation     Patient summary reviewed and Nursing notes reviewed   NPO Solid Status: > 6 hours  NPO Liquid Status: > 6 hours           Airway   Mallampati: II  TM distance: >3 FB  Neck ROM: full  No difficulty expected  Dental - normal exam     Pulmonary - normal exam    breath sounds clear to auscultation  (+) sleep apnea,   Cardiovascular - normal exam    ECG reviewed  Rhythm: regular  Rate: normal    (+) hypertension, CAD, dysrhythmias, CHF , hyperlipidemia,       Neuro/Psych  (+) numbness,    GI/Hepatic/Renal/Endo    (+) obesity,   hepatitis, liver disease, diabetes mellitus,     Musculoskeletal     Abdominal  - normal exam    Abdomen: soft.  Bowel sounds: normal.   Substance History - negative use     OB/GYN negative ob/gyn ROS         Other   arthritis,    history of cancer                    Anesthesia Plan    ASA 4     general   (Pt high risk for any anesthetic given significant health history.)  intravenous induction     Anesthetic plan, all risks, benefits, and alternatives have been provided, discussed and informed consent has been obtained with: patient.  Use of blood products discussed with patient .   Plan discussed with CAA.        CODE STATUS:

## 2022-04-05 NOTE — ANESTHESIA POSTPROCEDURE EVALUATION
Patient: Gladys Garrison    Procedure Summary     Date: 04/05/22 Room / Location: [facility] OR 06 / [facility] MAIN OR    Anesthesia Start: 0821 Anesthesia Stop: 1106    Procedure: PAIN PUMP INSERTION AND INTRATHECAL CATHETER PLACEMENT (N/A Back) Diagnosis:       Cancer associated pain      (Cancer associated pain [G89.3])    Surgeons: Chuck Hunter MD Provider: Andrew Gonzalez MD    Anesthesia Type: general ASA Status: 4          Anesthesia Type: general    Vitals  Vitals Value Taken Time   /75 04/05/22 1253   Temp 97.2 °F (36.2 °C) 04/05/22 1250   Pulse 70 04/05/22 1254   Resp 12 04/05/22 1250   SpO2 94 % 04/05/22 1254   Vitals shown include unvalidated device data.        Post Anesthesia Care and Evaluation    Patient location during evaluation: bedside  Patient participation: complete - patient participated  Level of consciousness: awake and alert  Pain score: 1  Pain management: adequate  Airway patency: patent  Anesthetic complications: No anesthetic complications  PONV Status: none  Cardiovascular status: acceptable  Respiratory status: acceptable  Hydration status: acceptable  Post Neuraxial Block status: Motor and sensory function returned to baseline

## 2022-04-13 ENCOUNTER — OFFICE VISIT (OUTPATIENT)
Dept: PAIN MEDICINE | Facility: CLINIC | Age: 62
End: 2022-04-13

## 2022-04-13 VITALS
HEART RATE: 78 BPM | SYSTOLIC BLOOD PRESSURE: 153 MMHG | DIASTOLIC BLOOD PRESSURE: 83 MMHG | RESPIRATION RATE: 16 BRPM | OXYGEN SATURATION: 96 % | WEIGHT: 162 LBS | BODY MASS INDEX: 30.63 KG/M2

## 2022-04-13 DIAGNOSIS — G89.3 CANCER ASSOCIATED PAIN: ICD-10-CM

## 2022-04-13 PROCEDURE — 99024 POSTOP FOLLOW-UP VISIT: CPT | Performed by: STUDENT IN AN ORGANIZED HEALTH CARE EDUCATION/TRAINING PROGRAM

## 2022-04-13 RX ORDER — OXYCODONE HYDROCHLORIDE AND ACETAMINOPHEN 5; 325 MG/1; MG/1
1 TABLET ORAL EVERY 8 HOURS PRN
Qty: 21 TABLET | Refills: 0 | Status: SHIPPED | OUTPATIENT
Start: 2022-04-13 | End: 2022-09-02

## 2022-04-13 NOTE — PROGRESS NOTES
CHIEF COMPLAINT  Chief Complaint   Patient presents with   • Cancer Pain     SCS post op f/u  Oxycodone and morphine pump  4/13 @ 1:30 pm        Primary Care  Angelica Rodriguez MD    Subjective   Gladys Garrison is a 61 y.o. female  who presents for cancer-related pain.  She has a significant history of breast cancer which is unfortunately metastatic.  She states she has lesions on her ribs as well as in her low back.  She describes pain in her low back which radiates in her legs as well as pain in her chest and side from reported rib fractures.  She is very hesitant to take any narcotic pain medication because she does not want to become dependent on medication.  She also has significant concerns regarding escalating pain requirements and inability to meet the requirements that she starts on narcotic pain medication.  She states in the past, she had excellent results with steroid injections for her shoulder knee pain.  She was referred by her oncologist for further management and nonnarcotic options.    History of Present Illness     Location: Neck, back, ribs  Onset: Years ago  Duration: Gradually worsening  Timing: Throughout the day  Quality: Stabbing, aching  Severity: Today: 8       Last Week: 8       Worst: 9  Modifying Factors: The pain is fairly constant without any significant exacerbating or relieving factors    Physical Therapy: no    Interval Update 04/13/2022: Here for follow-up from intrathecal pain pump implantation.  She is overall doing well.  She is tolerating the morphine without issue.  She is complaining of some chronic headache which could be related to her oxycodone usage.  She continues to complain of overall pain but remains at a very low dose of intrathecal morphine.  Her wounds are healing well without any evidence of infection or wound breakdown.    The following portions of the patient's history were reviewed and updated as appropriate: allergies, current medications, past family  history, past medical history, past social history, past surgical history and problem list.      Current Outpatient Medications:   •  acetaminophen (TYLENOL) 650 MG 8 hr tablet, Take 650 mg by mouth As Needed for Mild Pain . TAKES BETWEEN PAIN MEDS, Disp: , Rfl:   •  anastrozole (ARIMIDEX) 1 MG tablet, Take 1 tablet by mouth Daily., Disp: 30 tablet, Rfl: 6  •  aspirin 81 MG chewable tablet, Chew 1 tablet Daily., Disp: 30 tablet, Rfl: 1  •  Blood Glucose Monitoring Suppl (Accu-Chek Araceli) device, Use as instructed   To test blood sugar bid, Disp: 1 each, Rfl: 0  •  Calcium Carbonate-Vit D-Min (Calcium 600+D Plus Minerals) 600-400 MG-UNIT chewable tablet, Chew 600 mg 2 (Two) Times a Day. (Patient taking differently: Chew 600 mg 2 (Two) Times a Day. GUMMIES), Disp: 60 each, Rfl: 6  •  glucose blood (Accu-Chek Araceli Plus) test strip, Use as instructed   To test bid, Disp: 200 each, Rfl: 3  •  ibuprofen (ADVIL,MOTRIN) 400 MG tablet, Take 400 mg by mouth As Needed for Mild Pain . IN BETWEEN PAIN  MEDS, Disp: , Rfl:   •  insulin NPH-insulin regular (humuLIN 70/30,novoLIN 70/30) (70-30) 100 UNIT/ML injection, Inject 40 Units under the skin into the appropriate area as directed Every Morning., Disp: , Rfl:   •  insulin NPH-insulin regular (humuLIN 70/30,novoLIN 70/30) (70-30) 100 UNIT/ML injection, Inject 30 Units under the skin into the appropriate area as directed Every Evening., Disp: , Rfl:   •  Lancets (accu-chek soft touch) lancets, Test bid, Disp: 200 each, Rfl: 3  •  lisinopril (PRINIVIL,ZESTRIL) 20 MG tablet, Take 20 mg by mouth Daily., Disp: , Rfl:   •  naloxone (NARCAN) 4 MG/0.1ML nasal spray, 1 spray into the nostril(s) as directed by provider As Needed (Opioid overdose)., Disp: 1 each, Rfl: 0  •  ondansetron (Zofran) 8 MG tablet, Take 1 tablet by mouth Every 8 (Eight) Hours As Needed for Nausea or Vomiting. (Patient taking differently: Take 8 mg by mouth Every 8 (Eight) Hours As Needed for Nausea or Vomiting.  Takes routinely twice  a day), Disp: 30 tablet, Rfl: 0  •  oxyCODONE-acetaminophen (PERCOCET)  MG per tablet, Take 1 tablet by mouth Every 8 (Eight) Hours As Needed for Moderate Pain ., Disp: 21 tablet, Rfl: 0  •  Palbociclib (Ibrance) 125 MG capsule capsule, Take 1 capsule by mouth Daily. Take on days 1-21 with food and then off for 7 days., Disp: 21 capsule, Rfl: 6  •  rosuvastatin (Crestor) 20 MG tablet, Take 1 tablet by mouth Every Night., Disp: 90 tablet, Rfl: 0  •  oxyCODONE-acetaminophen (PERCOCET) 5-325 MG per tablet, Take 1 tablet by mouth Every 8 (Eight) Hours As Needed for Severe Pain ., Disp: 21 tablet, Rfl: 0    Review of Systems   Cardiovascular: Positive for chest pain.   Genitourinary: Positive for flank pain.   Musculoskeletal: Positive for arthralgias, back pain, gait problem, myalgias and neck pain.       Vitals:    04/13/22 1518   BP: 153/83   Pulse: 78   Resp: 16   SpO2: 96%   Weight: 73.5 kg (162 lb)   PainSc:   8       Objective   Physical Exam  Vitals and nursing note reviewed.   Constitutional:       General: She is not in acute distress.     Appearance: Normal appearance. She is obese.   Musculoskeletal:         General: Normal range of motion.      Comments: Surgical wounds clean, dry, intact.  No evidence of erythema or infection.  Significant bruising but no areas of skin breakdown.   Neurological:      General: No focal deficit present.      Mental Status: She is alert.      Sensory: No sensory deficit.      Motor: No weakness.           Assessment/Plan   Problems Addressed this Visit        Other    Cancer associated pain    Relevant Medications    oxyCODONE-acetaminophen (PERCOCET) 5-325 MG per tablet      Diagnoses       Codes Comments    Cancer associated pain     ICD-10-CM: G89.3  ICD-9-CM: 338.3           Plan:  1. I removed her dressings and replaced them.  I instructed her that she will be able to shower as long as she keeps her dressings dry.  We will plan to see her back  Monday to remove sutures and remaining dressings  2. Will refill oxycodone and decrease to 5 mg 3 times daily  3. Continue intrathecal pain pump  --- Follow-up 5 days for suture removal           INSPECT REPORT    As part of the patient's treatment plan, I may be prescribing controlled substances. The patient has been made aware of appropriate use of such medications, including potential risk of somnolence, limited ability to drive and/or work safely, and the potential for dependence or overdose. It has also bee made clear that these medications are for use by this patient only, without concomitant use of alcohol or other substances unless prescribed.     Patient has completed prescribing agreement detailing terms of continued prescribing of controlled substances, including monitoring JULIA reports, urine drug screening, and pill counts if necessary. The patient is aware that inappropriate use will results in cessation of prescribing such medications.    INSPECT report has been reviewed and scanned into the patient's chart.    As the clinician, I personally reviewed the INSPECT from 4/12/2022.    History and physical exam exhibit continued safe and appropriate use of controlled substances.      EMR Dragon/Transcription disclaimer:   Much of this encounter note is an electronic transcription/translation of spoken language to printed text. The electronic translation of spoken language may permit erroneous, or at times, nonsensical words or phrases to be inadvertently transcribed; Although I have reviewed the note for such errors, some may still exist.

## 2022-04-14 ENCOUNTER — TELEPHONE (OUTPATIENT)
Dept: PAIN MEDICINE | Facility: CLINIC | Age: 62
End: 2022-04-14

## 2022-04-14 NOTE — TELEPHONE ENCOUNTER
Caller: LUCIEN SARGENT    Relationship: SELF    Best call back number: [phone]    What form or medical record are you requesting: RETURN TO WORK - WITH RESTRICTIONS -- PATIENT STATED SHE CAN DO  POSITION    Who is requesting this form or medical record from you: [employer]    How would you like to receive the form or medical records (pick-up, mail, fax): FAX  If fax, what is the fax number: [phone]    Timeframe paperwork needed: ASAP    Additional notes: PATIENT IS REQUESTING RETURN TO WORK NOTE, SHE KNOWS THERE IS RESTRICTIONS BUT SHE STATED SHE CAN DO  POSITION. WOULD LIKE TO RETURN 4.15.22    ANY QUESTIONS OR CONCERNS PLEASE CONTACT PATIENT TY

## 2022-04-18 ENCOUNTER — OFFICE VISIT (OUTPATIENT)
Dept: PAIN MEDICINE | Facility: CLINIC | Age: 62
End: 2022-04-18

## 2022-04-18 VITALS
RESPIRATION RATE: 16 BRPM | SYSTOLIC BLOOD PRESSURE: 155 MMHG | HEART RATE: 84 BPM | DIASTOLIC BLOOD PRESSURE: 79 MMHG | OXYGEN SATURATION: 98 % | WEIGHT: 162 LBS | BODY MASS INDEX: 30.63 KG/M2

## 2022-04-18 DIAGNOSIS — G89.3 CANCER ASSOCIATED PAIN: Primary | ICD-10-CM

## 2022-04-18 DIAGNOSIS — C79.51 SPINE METASTASIS: ICD-10-CM

## 2022-04-18 PROCEDURE — 99024 POSTOP FOLLOW-UP VISIT: CPT | Performed by: STUDENT IN AN ORGANIZED HEALTH CARE EDUCATION/TRAINING PROGRAM

## 2022-04-18 NOTE — PROGRESS NOTES
CHIEF COMPLAINT  Chief Complaint   Patient presents with   • Cancer Pain     F/u from airway suture removal Oxycodone 4/18 @ 1 am        Primary Care  Angelica Rodriguez MD    Subjective   Gladys Garrison is a 61 y.o. female  who presents for cancer-related pain.  She has a significant history of breast cancer which is unfortunately metastatic.  She states she has lesions on her ribs as well as in her low back.  She describes pain in her low back which radiates in her legs as well as pain in her chest and side from reported rib fractures.  She is very hesitant to take any narcotic pain medication because she does not want to become dependent on medication.  She also has significant concerns regarding escalating pain requirements and inability to meet the requirements that she starts on narcotic pain medication.  She states in the past, she had excellent results with steroid injections for her shoulder knee pain.  She was referred by her oncologist for further management and nonnarcotic options.    History of Present Illness     Location: Neck, back, ribs  Onset: Years ago  Duration: Gradually worsening  Timing: Throughout the day  Quality: Stabbing, aching  Severity: Today: 8       Last Week: 8       Worst: 9  Modifying Factors: The pain is fairly constant without any significant exacerbating or relieving factors    Physical Therapy: no    Interval Update 04/18/2022: Here for repeat wound check and suture removal.  She continues to heal very well.  Her wounds are essentially healed.  No erythema or evidence of infection.  Wounds minimally tender to palpation.  She complains of some breakthrough pain despite being on intrathecal pain pump.    The following portions of the patient's history were reviewed and updated as appropriate: allergies, current medications, past family history, past medical history, past social history, past surgical history and problem list.      Current Outpatient Medications:   •   acetaminophen (TYLENOL) 650 MG 8 hr tablet, Take 650 mg by mouth As Needed for Mild Pain . TAKES BETWEEN PAIN MEDS, Disp: , Rfl:   •  anastrozole (ARIMIDEX) 1 MG tablet, Take 1 tablet by mouth Daily., Disp: 30 tablet, Rfl: 6  •  aspirin 81 MG chewable tablet, Chew 1 tablet Daily., Disp: 30 tablet, Rfl: 1  •  Blood Glucose Monitoring Suppl (Accu-Chek Araceli) device, Use as instructed   To test blood sugar bid, Disp: 1 each, Rfl: 0  •  Calcium Carbonate-Vit D-Min (Calcium 600+D Plus Minerals) 600-400 MG-UNIT chewable tablet, Chew 600 mg 2 (Two) Times a Day. (Patient taking differently: Chew 600 mg 2 (Two) Times a Day. GUMMIES), Disp: 60 each, Rfl: 6  •  glucose blood (Accu-Chek Araceli Plus) test strip, Use as instructed   To test bid, Disp: 200 each, Rfl: 3  •  ibuprofen (ADVIL,MOTRIN) 400 MG tablet, Take 400 mg by mouth As Needed for Mild Pain . IN BETWEEN PAIN  MEDS, Disp: , Rfl:   •  insulin NPH-insulin regular (humuLIN 70/30,novoLIN 70/30) (70-30) 100 UNIT/ML injection, Inject 40 Units under the skin into the appropriate area as directed Every Morning., Disp: , Rfl:   •  insulin NPH-insulin regular (humuLIN 70/30,novoLIN 70/30) (70-30) 100 UNIT/ML injection, Inject 30 Units under the skin into the appropriate area as directed Every Evening., Disp: , Rfl:   •  Lancets (accu-chek soft touch) lancets, Test bid, Disp: 200 each, Rfl: 3  •  lisinopril (PRINIVIL,ZESTRIL) 20 MG tablet, Take 20 mg by mouth Daily., Disp: , Rfl:   •  naloxone (NARCAN) 4 MG/0.1ML nasal spray, 1 spray into the nostril(s) as directed by provider As Needed (Opioid overdose)., Disp: 1 each, Rfl: 0  •  ondansetron (Zofran) 8 MG tablet, Take 1 tablet by mouth Every 8 (Eight) Hours As Needed for Nausea or Vomiting. (Patient taking differently: Take 8 mg by mouth Every 8 (Eight) Hours As Needed for Nausea or Vomiting. Takes routinely twice  a day), Disp: 30 tablet, Rfl: 0  •  oxyCODONE-acetaminophen (PERCOCET)  MG per tablet, Take 1 tablet by  mouth Every 8 (Eight) Hours As Needed for Moderate Pain ., Disp: 21 tablet, Rfl: 0  •  oxyCODONE-acetaminophen (PERCOCET) 5-325 MG per tablet, Take 1 tablet by mouth Every 8 (Eight) Hours As Needed for Severe Pain ., Disp: 21 tablet, Rfl: 0  •  Palbociclib (Ibrance) 125 MG capsule capsule, Take 1 capsule by mouth Daily. Take on days 1-21 with food and then off for 7 days., Disp: 21 capsule, Rfl: 6  •  rosuvastatin (Crestor) 20 MG tablet, Take 1 tablet by mouth Every Night., Disp: 90 tablet, Rfl: 0    Review of Systems   Cardiovascular: Positive for chest pain.   Genitourinary: Positive for flank pain.   Musculoskeletal: Positive for arthralgias, back pain, gait problem, myalgias and neck pain.       Vitals:    04/18/22 1028   BP: 155/79   Pulse: 84   Resp: 16   SpO2: 98%   Weight: 73.5 kg (162 lb)   PainSc:   8       Objective   Physical Exam  Vitals and nursing note reviewed.   Constitutional:       General: She is not in acute distress.     Appearance: Normal appearance. She is obese.   Musculoskeletal:         General: Normal range of motion.      Comments: Surgical wounds clean, dry, intact.  No evidence of erythema or infection.  Significant bruising but no areas of skin breakdown.   Neurological:      General: No focal deficit present.      Mental Status: She is alert.      Sensory: No sensory deficit.      Motor: No weakness.           Assessment/Plan   Problems Addressed this Visit        Other    Cancer associated pain - Primary      Other Visit Diagnoses     Spine metastasis (HCC)          Diagnoses       Codes Comments    Cancer associated pain    -  Primary ICD-10-CM: G89.3  ICD-9-CM: 338.3     Spine metastasis (HCC)     ICD-10-CM: C79.51  ICD-9-CM: 198.5           Plan:  1. Sutures removed without issue  2. Daily dose increased from 0.100 mg of morphine to 0.125 mg morphine daily  3. We will follow back up in 1 month to assess the dosage increase and 1 last wound check  --- Follow-up 1 month            INSPECT REPORT    As part of the patient's treatment plan, I may be prescribing controlled substances. The patient has been made aware of appropriate use of such medications, including potential risk of somnolence, limited ability to drive and/or work safely, and the potential for dependence or overdose. It has also bee made clear that these medications are for use by this patient only, without concomitant use of alcohol or other substances unless prescribed.     Patient has completed prescribing agreement detailing terms of continued prescribing of controlled substances, including monitoring JULIA reports, urine drug screening, and pill counts if necessary. The patient is aware that inappropriate use will results in cessation of prescribing such medications.    INSPECT report has been reviewed and scanned into the patient's chart.    As the clinician, I personally reviewed the INSPECT from 4/15/2022.    History and physical exam exhibit continued safe and appropriate use of controlled substances.      EMR Dragon/Transcription disclaimer:   Much of this encounter note is an electronic transcription/translation of spoken language to printed text. The electronic translation of spoken language may permit erroneous, or at times, nonsensical words or phrases to be inadvertently transcribed; Although I have reviewed the note for such errors, some may still exist.

## 2022-05-02 ENCOUNTER — OFFICE VISIT (OUTPATIENT)
Dept: PAIN MEDICINE | Facility: CLINIC | Age: 62
End: 2022-05-02

## 2022-05-02 VITALS
BODY MASS INDEX: 30.63 KG/M2 | HEART RATE: 74 BPM | SYSTOLIC BLOOD PRESSURE: 145 MMHG | WEIGHT: 162 LBS | DIASTOLIC BLOOD PRESSURE: 77 MMHG | OXYGEN SATURATION: 94 % | RESPIRATION RATE: 16 BRPM

## 2022-05-02 DIAGNOSIS — C79.51 SPINE METASTASIS: ICD-10-CM

## 2022-05-02 DIAGNOSIS — G89.3 CANCER ASSOCIATED PAIN: Primary | ICD-10-CM

## 2022-05-02 PROCEDURE — 99214 OFFICE O/P EST MOD 30 MIN: CPT | Performed by: STUDENT IN AN ORGANIZED HEALTH CARE EDUCATION/TRAINING PROGRAM

## 2022-05-02 RX ORDER — ONDANSETRON 4 MG/1
4 TABLET, FILM COATED ORAL EVERY 8 HOURS PRN
Qty: 90 TABLET | Refills: 0 | Status: SHIPPED | OUTPATIENT
Start: 2022-05-02 | End: 2022-05-19

## 2022-05-02 NOTE — PROGRESS NOTES
CHIEF COMPLAINT  Chief Complaint   Patient presents with   • Pain     1 month f/u for cancer associated pain treated with Oxycodone 5/1 @ 9:30 am        Primary Care  Angelica Rodriguez MD    Subjective   Gladys Garrison is a 61 y.o. female  who presents for cancer-related pain.  She has a significant history of breast cancer which is unfortunately metastatic.  She states she has lesions on her ribs as well as in her low back.  She describes pain in her low back which radiates in her legs as well as pain in her chest and side from reported rib fractures.  She is very hesitant to take any narcotic pain medication because she does not want to become dependent on medication.  She also has significant concerns regarding escalating pain requirements and inability to meet the requirements that she starts on narcotic pain medication.  She states in the past, she had excellent results with steroid injections for her shoulder knee pain.  She was referred by her oncologist for further management and nonnarcotic options.    Pain  Associated symptoms include arthralgias, chest pain, myalgias and neck pain.        Location: Neck, back, ribs  Onset: Years ago  Duration: Gradually worsening  Timing: Throughout the day  Quality: Stabbing, aching  Severity: Today: 8       Last Week: 8       Worst: 9  Modifying Factors: The pain is fairly constant without any significant exacerbating or relieving factors    Physical Therapy: no    Interval Update 05/02/2022: Overall wound looks completely healed and without any evidence of infection or erythema.  She is getting some benefit from her intrathecal pain pump.  She is taking oxycodone fairly consistently and is somewhat unsure what dose she is actually taking.  Is complaining of some nausea but denies any constipation or sedation.    The following portions of the patient's history were reviewed and updated as appropriate: allergies, current medications, past family history, past  medical history, past social history, past surgical history and problem list.      Current Outpatient Medications:   •  acetaminophen (TYLENOL) 650 MG 8 hr tablet, Take 650 mg by mouth As Needed for Mild Pain . TAKES BETWEEN PAIN MEDS, Disp: , Rfl:   •  anastrozole (ARIMIDEX) 1 MG tablet, Take 1 tablet by mouth Daily., Disp: 30 tablet, Rfl: 6  •  aspirin 81 MG chewable tablet, Chew 1 tablet Daily., Disp: 30 tablet, Rfl: 1  •  Blood Glucose Monitoring Suppl (Accu-Chek Araceli) device, Use as instructed   To test blood sugar bid, Disp: 1 each, Rfl: 0  •  Calcium Carbonate-Vit D-Min (Calcium 600+D Plus Minerals) 600-400 MG-UNIT chewable tablet, Chew 600 mg 2 (Two) Times a Day. (Patient taking differently: Chew 600 mg 2 (Two) Times a Day. GUMMIES), Disp: 60 each, Rfl: 6  •  glucose blood (Accu-Chek Araceli Plus) test strip, Use as instructed   To test bid, Disp: 200 each, Rfl: 3  •  ibuprofen (ADVIL,MOTRIN) 400 MG tablet, Take 400 mg by mouth As Needed for Mild Pain . IN BETWEEN PAIN  MEDS, Disp: , Rfl:   •  insulin NPH-insulin regular (humuLIN 70/30,novoLIN 70/30) (70-30) 100 UNIT/ML injection, Inject 40 Units under the skin into the appropriate area as directed Every Morning., Disp: , Rfl:   •  insulin NPH-insulin regular (humuLIN 70/30,novoLIN 70/30) (70-30) 100 UNIT/ML injection, Inject 30 Units under the skin into the appropriate area as directed Every Evening., Disp: , Rfl:   •  Lancets (accu-chek soft touch) lancets, Test bid, Disp: 200 each, Rfl: 3  •  lisinopril (PRINIVIL,ZESTRIL) 20 MG tablet, Take 20 mg by mouth Daily., Disp: , Rfl:   •  naloxone (NARCAN) 4 MG/0.1ML nasal spray, 1 spray into the nostril(s) as directed by provider As Needed (Opioid overdose)., Disp: 1 each, Rfl: 0  •  oxyCODONE-acetaminophen (PERCOCET) 5-325 MG per tablet, Take 1 tablet by mouth Every 8 (Eight) Hours As Needed for Severe Pain ., Disp: 21 tablet, Rfl: 0  •  Palbociclib (Ibrance) 125 MG capsule capsule, Take 1 capsule by mouth  Daily. Take on days 1-21 with food and then off for 7 days., Disp: 21 capsule, Rfl: 6  •  rosuvastatin (Crestor) 20 MG tablet, Take 1 tablet by mouth Every Night., Disp: 90 tablet, Rfl: 0  •  ondansetron (Zofran) 4 MG tablet, Take 1 tablet by mouth Every 8 (Eight) Hours As Needed for Nausea or Vomiting., Disp: 90 tablet, Rfl: 0    Review of Systems   Cardiovascular: Positive for chest pain.   Genitourinary: Positive for flank pain.   Musculoskeletal: Positive for arthralgias, back pain, gait problem, myalgias and neck pain.       Vitals:    05/02/22 0942   BP: 145/77   Pulse: 74   Resp: 16   SpO2: 94%   Weight: 73.5 kg (162 lb)   PainSc:   8       Objective   Physical Exam  Vitals and nursing note reviewed.   Constitutional:       General: She is not in acute distress.     Appearance: Normal appearance. She is obese.   Musculoskeletal:         General: Normal range of motion.      Comments: Surgical wounds clean, dry, intact.  No evidence of erythema or infection.  Significant bruising but no areas of skin breakdown.   Neurological:      General: No focal deficit present.      Mental Status: She is alert.      Sensory: No sensory deficit.      Motor: No weakness.           Assessment/Plan   Problems Addressed this Visit    None     Diagnoses    None.         Plan:  1. Her wounds are essentially fully healed  2. Increase daily dose of morphine to 0.130 mg/day  3. Follow-up in 1 month to reassess pain control  4. Refill Zofran 4 mg 3 times daily  --- Follow-up 1 month           INSPECT REPORT    As part of the patient's treatment plan, I may be prescribing controlled substances. The patient has been made aware of appropriate use of such medications, including potential risk of somnolence, limited ability to drive and/or work safely, and the potential for dependence or overdose. It has also bee made clear that these medications are for use by this patient only, without concomitant use of alcohol or other substances  unless prescribed.     Patient has completed prescribing agreement detailing terms of continued prescribing of controlled substances, including monitoring JULIA reports, urine drug screening, and pill counts if necessary. The patient is aware that inappropriate use will results in cessation of prescribing such medications.    INSPECT report has been reviewed and scanned into the patient's chart.    As the clinician, I personally reviewed the INSPECT from 5/1/2022.    History and physical exam exhibit continued safe and appropriate use of controlled substances.      EMR Dragon/Transcription disclaimer:   Much of this encounter note is an electronic transcription/translation of spoken language to printed text. The electronic translation of spoken language may permit erroneous, or at times, nonsensical words or phrases to be inadvertently transcribed; Although I have reviewed the note for such errors, some may still exist.

## 2022-05-16 ENCOUNTER — APPOINTMENT (OUTPATIENT)
Dept: LAB | Facility: HOSPITAL | Age: 62
End: 2022-05-16

## 2022-05-16 ENCOUNTER — APPOINTMENT (OUTPATIENT)
Dept: PHARMACY | Facility: HOSPITAL | Age: 62
End: 2022-05-16

## 2022-05-19 ENCOUNTER — HOSPITAL ENCOUNTER (OUTPATIENT)
Facility: HOSPITAL | Age: 62
Setting detail: OBSERVATION
Discharge: HOME | End: 2022-05-21
Attending: EMERGENCY MEDICINE | Admitting: EMERGENCY MEDICINE

## 2022-05-19 ENCOUNTER — APPOINTMENT (OUTPATIENT)
Dept: GENERAL RADIOLOGY | Facility: HOSPITAL | Age: 62
End: 2022-05-19

## 2022-05-19 DIAGNOSIS — R06.00 DYSPNEA, UNSPECIFIED TYPE: Primary | ICD-10-CM

## 2022-05-19 LAB
ALBUMIN SERPL-MCNC: 4.4 G/DL (ref 3.5–5.2)
ALBUMIN/GLOB SERPL: 1.3 G/DL
ALP SERPL-CCNC: 99 U/L (ref 39–117)
ALT SERPL W P-5'-P-CCNC: 18 U/L (ref 1–33)
ANION GAP SERPL CALCULATED.3IONS-SCNC: 13 MMOL/L (ref 5–15)
APTT PPP: 30 SECONDS (ref 61–76.5)
AST SERPL-CCNC: 24 U/L (ref 1–32)
B PARAPERT DNA SPEC QL NAA+PROBE: NOT DETECTED
B PERT DNA SPEC QL NAA+PROBE: NOT DETECTED
BACTERIA UR QL AUTO: ABNORMAL /HPF
BASOPHILS # BLD AUTO: 0 10*3/MM3 (ref 0–0.2)
BASOPHILS NFR BLD AUTO: 0.6 % (ref 0–1.5)
BILIRUB SERPL-MCNC: 0.3 MG/DL (ref 0–1.2)
BILIRUB UR QL STRIP: NEGATIVE
BUN SERPL-MCNC: 16 MG/DL (ref 6–20)
BUN/CREAT SERPL: 20.8 (ref 7–25)
C PNEUM DNA NPH QL NAA+NON-PROBE: NOT DETECTED
CALCIUM SPEC-SCNC: 10 MG/DL (ref 8.6–10.5)
CHLORIDE SERPL-SCNC: 105 MMOL/L (ref 98–107)
CLARITY UR: CLEAR
CO2 SERPL-SCNC: 23 MMOL/L (ref 22–29)
COLOR UR: YELLOW
CREAT SERPL-MCNC: 0.77 MG/DL (ref 0.57–1)
D DIMER PPP FEU-MCNC: 0.46 MG/L (FEU) (ref 0–0.59)
DEPRECATED RDW RBC AUTO: 44.6 FL (ref 37–54)
EGFRCR SERPLBLD CKD-EPI 2021: 89 ML/MIN/1.73
EOSINOPHIL # BLD AUTO: 0.1 10*3/MM3 (ref 0–0.4)
EOSINOPHIL NFR BLD AUTO: 2.6 % (ref 0.3–6.2)
ERYTHROCYTE [DISTWIDTH] IN BLOOD BY AUTOMATED COUNT: 14.1 % (ref 12.3–15.4)
FLUAV SUBTYP SPEC NAA+PROBE: NOT DETECTED
FLUBV RNA ISLT QL NAA+PROBE: NOT DETECTED
GLOBULIN UR ELPH-MCNC: 3.3 GM/DL
GLUCOSE BLDC GLUCOMTR-MCNC: 117 MG/DL (ref 70–105)
GLUCOSE SERPL-MCNC: 61 MG/DL (ref 65–99)
GLUCOSE UR STRIP-MCNC: NEGATIVE MG/DL
HADV DNA SPEC NAA+PROBE: NOT DETECTED
HCOV 229E RNA SPEC QL NAA+PROBE: NOT DETECTED
HCOV HKU1 RNA SPEC QL NAA+PROBE: NOT DETECTED
HCOV NL63 RNA SPEC QL NAA+PROBE: NOT DETECTED
HCOV OC43 RNA SPEC QL NAA+PROBE: NOT DETECTED
HCT VFR BLD AUTO: 41.7 % (ref 34–46.6)
HETEROPH AB SER QL LA: NEGATIVE
HGB BLD-MCNC: 13.2 G/DL (ref 12–15.9)
HGB UR QL STRIP.AUTO: NEGATIVE
HMPV RNA NPH QL NAA+NON-PROBE: NOT DETECTED
HOLD SPECIMEN: NORMAL
HPIV1 RNA ISLT QL NAA+PROBE: NOT DETECTED
HPIV2 RNA SPEC QL NAA+PROBE: NOT DETECTED
HPIV3 RNA NPH QL NAA+PROBE: NOT DETECTED
HPIV4 P GENE NPH QL NAA+PROBE: NOT DETECTED
HYALINE CASTS UR QL AUTO: ABNORMAL /LPF
INR PPP: 1.07 (ref 0.93–1.1)
KETONES UR QL STRIP: NEGATIVE
LEUKOCYTE ESTERASE UR QL STRIP.AUTO: ABNORMAL
LYMPHOCYTES # BLD AUTO: 1.3 10*3/MM3 (ref 0.7–3.1)
LYMPHOCYTES NFR BLD AUTO: 22.6 % (ref 19.6–45.3)
M PNEUMO IGG SER IA-ACNC: NOT DETECTED
MCH RBC QN AUTO: 28.8 PG (ref 26.6–33)
MCHC RBC AUTO-ENTMCNC: 31.8 G/DL (ref 31.5–35.7)
MCV RBC AUTO: 90.5 FL (ref 79–97)
MONOCYTES # BLD AUTO: 0.4 10*3/MM3 (ref 0.1–0.9)
MONOCYTES NFR BLD AUTO: 6.8 % (ref 5–12)
NEUTROPHILS NFR BLD AUTO: 3.8 10*3/MM3 (ref 1.7–7)
NEUTROPHILS NFR BLD AUTO: 67.4 % (ref 42.7–76)
NITRITE UR QL STRIP: NEGATIVE
NRBC BLD AUTO-RTO: 0.1 /100 WBC (ref 0–0.2)
NT-PROBNP SERPL-MCNC: 488.4 PG/ML (ref 0–900)
PH UR STRIP.AUTO: 7 [PH] (ref 5–8)
PLATELET # BLD AUTO: 113 10*3/MM3 (ref 140–450)
PMV BLD AUTO: 8.2 FL (ref 6–12)
POTASSIUM SERPL-SCNC: 4 MMOL/L (ref 3.5–5.2)
PROT SERPL-MCNC: 7.7 G/DL (ref 6–8.5)
PROT UR QL STRIP: NEGATIVE
PROTHROMBIN TIME: 11 SECONDS (ref 9.6–11.7)
RBC # BLD AUTO: 4.6 10*6/MM3 (ref 3.77–5.28)
RBC # UR STRIP: ABNORMAL /HPF
REF LAB TEST METHOD: ABNORMAL
RHINOVIRUS RNA SPEC NAA+PROBE: NOT DETECTED
RSV RNA NPH QL NAA+NON-PROBE: NOT DETECTED
SARS-COV-2 RNA NPH QL NAA+NON-PROBE: NOT DETECTED
SODIUM SERPL-SCNC: 141 MMOL/L (ref 136–145)
SP GR UR STRIP: 1.02 (ref 1–1.03)
SQUAMOUS #/AREA URNS HPF: ABNORMAL /HPF
TROPONIN T SERPL-MCNC: <0.01 NG/ML (ref 0–0.03)
UROBILINOGEN UR QL STRIP: ABNORMAL
WBC # UR STRIP: ABNORMAL /HPF
WBC NRBC COR # BLD: 5.7 10*3/MM3 (ref 3.4–10.8)
WHOLE BLOOD HOLD COAG: NORMAL

## 2022-05-19 PROCEDURE — 85610 PROTHROMBIN TIME: CPT | Performed by: NURSE PRACTITIONER

## 2022-05-19 PROCEDURE — 71045 X-RAY EXAM CHEST 1 VIEW: CPT

## 2022-05-19 PROCEDURE — 84484 ASSAY OF TROPONIN QUANT: CPT | Performed by: NURSE PRACTITIONER

## 2022-05-19 PROCEDURE — G0378 HOSPITAL OBSERVATION PER HR: HCPCS

## 2022-05-19 PROCEDURE — 86757 RICKETTSIA ANTIBODY: CPT | Performed by: NURSE PRACTITIONER

## 2022-05-19 PROCEDURE — 99284 EMERGENCY DEPT VISIT MOD MDM: CPT

## 2022-05-19 PROCEDURE — 93005 ELECTROCARDIOGRAM TRACING: CPT

## 2022-05-19 PROCEDURE — 0202U NFCT DS 22 TRGT SARS-COV-2: CPT | Performed by: NURSE PRACTITIONER

## 2022-05-19 PROCEDURE — 85025 COMPLETE CBC W/AUTO DIFF WBC: CPT | Performed by: NURSE PRACTITIONER

## 2022-05-19 PROCEDURE — 80053 COMPREHEN METABOLIC PANEL: CPT | Performed by: NURSE PRACTITIONER

## 2022-05-19 PROCEDURE — 86618 LYME DISEASE ANTIBODY: CPT | Performed by: NURSE PRACTITIONER

## 2022-05-19 PROCEDURE — 81001 URINALYSIS AUTO W/SCOPE: CPT | Performed by: NURSE PRACTITIONER

## 2022-05-19 PROCEDURE — 85379 FIBRIN DEGRADATION QUANT: CPT | Performed by: NURSE PRACTITIONER

## 2022-05-19 PROCEDURE — 86666 EHRLICHIA ANTIBODY: CPT | Performed by: NURSE PRACTITIONER

## 2022-05-19 PROCEDURE — 93005 ELECTROCARDIOGRAM TRACING: CPT | Performed by: EMERGENCY MEDICINE

## 2022-05-19 PROCEDURE — 85730 THROMBOPLASTIN TIME PARTIAL: CPT | Performed by: NURSE PRACTITIONER

## 2022-05-19 PROCEDURE — 86308 HETEROPHILE ANTIBODY SCREEN: CPT | Performed by: NURSE PRACTITIONER

## 2022-05-19 PROCEDURE — 83880 ASSAY OF NATRIURETIC PEPTIDE: CPT | Performed by: NURSE PRACTITIONER

## 2022-05-19 PROCEDURE — 82962 GLUCOSE BLOOD TEST: CPT

## 2022-05-19 PROCEDURE — 86622 BRUCELLA ANTIBODY: CPT | Performed by: NURSE PRACTITIONER

## 2022-05-19 RX ORDER — NITROGLYCERIN 0.4 MG/1
0.4 TABLET SUBLINGUAL
Status: DISCONTINUED | OUTPATIENT
Start: 2022-05-19 | End: 2022-05-21 | Stop reason: HOSPADM

## 2022-05-19 RX ORDER — ROSUVASTATIN CALCIUM 10 MG/1
20 TABLET, COATED ORAL NIGHTLY
Status: DISCONTINUED | OUTPATIENT
Start: 2022-05-20 | End: 2022-05-21 | Stop reason: HOSPADM

## 2022-05-19 RX ORDER — ONDANSETRON 2 MG/ML
4 INJECTION INTRAMUSCULAR; INTRAVENOUS EVERY 6 HOURS PRN
Status: DISCONTINUED | OUTPATIENT
Start: 2022-05-19 | End: 2022-05-21 | Stop reason: HOSPADM

## 2022-05-19 RX ORDER — MORPHINE SULFATE 2 MG/ML
1 INJECTION, SOLUTION INTRAMUSCULAR; INTRAVENOUS EVERY 4 HOURS PRN
Status: DISCONTINUED | OUTPATIENT
Start: 2022-05-19 | End: 2022-05-21 | Stop reason: HOSPADM

## 2022-05-19 RX ORDER — ACETAMINOPHEN 325 MG/1
650 TABLET ORAL EVERY 4 HOURS PRN
Status: DISCONTINUED | OUTPATIENT
Start: 2022-05-19 | End: 2022-05-21 | Stop reason: HOSPADM

## 2022-05-19 RX ORDER — SODIUM CHLORIDE 0.9 % (FLUSH) 0.9 %
10 SYRINGE (ML) INJECTION AS NEEDED
Status: DISCONTINUED | OUTPATIENT
Start: 2022-05-19 | End: 2022-05-21 | Stop reason: HOSPADM

## 2022-05-19 RX ORDER — CHOLECALCIFEROL (VITAMIN D3) 125 MCG
5 CAPSULE ORAL NIGHTLY PRN
Status: DISCONTINUED | OUTPATIENT
Start: 2022-05-19 | End: 2022-05-21 | Stop reason: HOSPADM

## 2022-05-19 RX ORDER — NALOXONE HCL 0.4 MG/ML
0.4 VIAL (ML) INJECTION
Status: DISCONTINUED | OUTPATIENT
Start: 2022-05-19 | End: 2022-05-21 | Stop reason: HOSPADM

## 2022-05-19 NOTE — ED PROVIDER NOTES
Subjective   Patient is a 61-year-old female presents to the emergency department after initially being seen at urgent care.  Patient reports she has had fatigue for the past week, rash, decreased oxygen level, shortness of breath with exertion.  Patient denies any chest pain.  No fever chills.  No recent travel.  She denies any tick bites, [place]  She is not had any fever or chills.  No headache or visual disturbances.  No abdominal pain, nausea, vomiting diarrhea.  No IV drug abuse.          Review of Systems   Constitutional: Positive for fatigue. Negative for chills, diaphoresis and fever.   HENT: Negative for congestion.    Eyes: Negative for photophobia and visual disturbance.   Respiratory: Negative for chest tightness and shortness of breath.    Cardiovascular: Negative for chest pain.   Gastrointestinal: Negative for abdominal pain, diarrhea, nausea and vomiting.   Genitourinary: Negative for dysuria.   Musculoskeletal: Negative for back pain and neck pain.   Skin: Positive for rash. Negative for color change.   Neurological: Negative for dizziness, weakness and light-headedness.   Psychiatric/Behavioral: Negative for confusion.       Past Medical History:   Diagnosis Date   • Allergic    • Bone cancer (HCC)     METASTATIC BONE   • Bradycardia     SECONDARY TO ABLATION   • Breast cancer (HCC) 2017    mets to lymph nodes; did not do radiation   • Cancer of unknown origin (HCC)    • Compression fx, thoracic spine, open, initial encounter (HCC)    • Coronary artery disease    • COVID-19 09/01/2021   • Diabetes mellitus (HCC)    • Heart disease, unspecified    • Hepatitis C     RESOLVED WITH MEDICATION   • Hyperlipidemia    • Hypertension    • Obesity (BMI 30-39.9) 02/05/2021   • Sleep apnea     no machine   • Tachycardia     ATRIAL   • Type 2 diabetes mellitus (HCC) 11/2017       Allergies   Allergen Reactions   • Promethazine Other (See Comments)     Hyperactive mean   • Tape  Other (See Comments)     .blisters         Past Surgical History:   Procedure Laterality Date   • BACK SURGERY      neck X 2   • BREAST RECONSTRUCTION Bilateral    • CARDIAC ABLATION       atrial tachycardia x 5 ablations    • CARDIAC CATHETERIZATION     • CARDIAC SURGERY      stent placed in aorta   • CARDIAC SURGERY      6 surgeries as baby    • CERVICAL FUSION ANTERIOR WITH ARTIFICIAL DISCECTOMY IMPLANTATION N/A 2020    Procedure: C4 VERTEBRECTOMY AND ANTERIOR CERIVCAL DISCECTOMY WITH FUSION OF CERVICAL THREE THROUGH FIVE WITH REMOVAL OF HARDWARE C5-C6;  Surgeon: Mark Kaba MD;  Location: [facility] MAIN OR;  Service: Neurosurgery;  Laterality: N/A;   •  SECTION      x2   • COLONOSCOPY N/A 10/23/2020    Procedure: COLONOSCOPY WITH POLYPECTOMY X6;  Surgeon: Stanley Bourne MD;  Location: [facility] ENDOSCOPY;  Service: Gastroenterology;  Laterality: N/A;  POLYPS, INTERNAL HEMORRHOIDS   • ENDOMETRIAL ABLATION      REMOVAL SCAR TISSUE UTERINE   • ENDOSCOPY N/A 10/23/2020    Procedure: ESOPHAGOGASTRODUODENOSCOPY WITH BIOPSY X 1 AREA;  Surgeon: Stanley Bourne MD;  Location: [facility] ENDOSCOPY;  Service: Gastroenterology;  Laterality: N/A;  GASTRITIS, ESOPHAGITIS, HIATAL HERNIA   • KNEE SURGERY     • MASTECTOMY Bilateral    • NECK SURGERY     • PACEMAKER IMPLANTATION     • PAIN PUMP INSERTION/REVISION N/A 2022    Procedure: PAIN PUMP INSERTION AND INTRATHECAL CATHETER PLACEMENT;  Surgeon: Chuck Hunter MD;  Location: [facility] MAIN OR;  Service: Pain Management;  Laterality: N/A;       Family History   Problem Relation Age of Onset   • Heart disease Mother    • Stroke Mother    • Lung cancer Mother    • Aneurysm Father    • Diabetes Sister    • No Known Problems Brother    • No Known Problems Brother    • Diabetes type I Half-Sister    • Thyroid cancer Half-Sister    • Cancer Maternal Aunt    • Heart attack Sister    • Thyroid disease Sister    • No Known Problems Sister         Social History     Socioeconomic History   • Marital status:    • Number of children: 2   Tobacco Use   • Smoking status: Never Smoker   • Smokeless tobacco: Never Used   Vaping Use   • Vaping Use: Never used   Substance and Sexual Activity   • Alcohol use: Yes     Alcohol/week: 1.0 standard drink     Types: 1 Glasses of wine per week     Comment: rare   • Drug use: Not Currently   • Sexual activity: Defer           Objective   Physical Exam  Vitals and nursing note reviewed.   Constitutional:       General: She is not in acute distress.     Appearance: She is well-developed. She is not ill-appearing, toxic-appearing or diaphoretic.   HENT:      Head: Normocephalic and atraumatic.      Mouth/Throat:      Mouth: Mucous membranes are moist.      Pharynx: Oropharynx is clear.   Eyes:      Extraocular Movements: Extraocular movements intact.      Pupils: Pupils are equal, round, and reactive to light.   Cardiovascular:      Rate and Rhythm: Normal rate and regular rhythm.      Heart sounds: No murmur heard.    No friction rub. No gallop.   Pulmonary:      Effort: Pulmonary effort is normal.      Breath sounds: Normal breath sounds.   Abdominal:      General: Bowel sounds are normal.      Palpations: Abdomen is soft.   Musculoskeletal:         General: Normal range of motion.      Cervical back: Normal range of motion and neck supple.      Right lower leg: No tenderness. No edema.      Left lower leg: No tenderness. No edema.   Skin:     General: Skin is warm and dry.      Capillary Refill: Capillary refill takes less than 2 seconds.      Findings: Rash (Patient has petechial rash noted to lower extremities.  Nonblanchable.  Rash does not involve the palms or soles.) present.   Neurological:      General: No focal deficit present.      Mental Status: She is alert and oriented to person, place, and time.   Psychiatric:         Mood and Affect: Mood normal.         Behavior: Behavior normal.          Procedures           ED Course  ED Course as of 05/20/22 0127   Fri May 20, 2022   0125 BUN: 16 [LB]      ED Course User Index  [LB] Leatha Barnard, APRN             AV paced rate 71.  Compared to previous 3/29/2022.                                        MDM  Appropriate PPE was worn during the duration of the care for this patient while in the emergency department per [facility] Policy    Differentials: Tickborne illness, PE, pneumonia  This list is not all inclusive and does not constitute the entireity of considered causes.     Patient was brought back to the emergency department room for evaluation and placed on appropriate monitoring.  Patient had IV established and blood work obtained    Labs/Imaging/Studies: D-dimer negative.  CMP unremarkable.  Respiratory panel negative.  Tick panel is pending.  UA not indicative of infection.  Chest x-ray no acute findings.  Platelets are to be 113, patient has had decreased platelets in the past.    Delays in care/labs/imaging/IV placement: IV placement    ED Course/ Workup,/Summary and Disposition: Given patient's report of dyspnea, fatigue, feeling unwell with O2 saturations decrease to urgent care, patient was placed in ED observation for further evaluation.  Patient agreeable to current treatment plan and work-up.     She also seen by ED attending physician.  Agrees with treatment plan and disposition.                                Final diagnoses:   Dyspnea, unspecified type       ED Disposition  ED Disposition     ED Disposition   Decision to Admit    Condition   --    Comment   --             No follow-up provider specified.       Medication List      No changes were made to your prescriptions during this visit.          Leatha Barnard, APRN  05/20/22 0127

## 2022-05-20 ENCOUNTER — APPOINTMENT (OUTPATIENT)
Dept: CARDIOLOGY | Facility: HOSPITAL | Age: 62
End: 2022-05-20

## 2022-05-20 ENCOUNTER — APPOINTMENT (OUTPATIENT)
Dept: RESPIRATORY THERAPY | Facility: HOSPITAL | Age: 62
End: 2022-05-20

## 2022-05-20 ENCOUNTER — APPOINTMENT (OUTPATIENT)
Dept: CT IMAGING | Facility: HOSPITAL | Age: 62
End: 2022-05-20

## 2022-05-20 LAB
BH CV ECHO MEAS - AO MAX PG: 7.7 MMHG
BH CV ECHO MEAS - AO MEAN PG: 4.5 MMHG
BH CV ECHO MEAS - AO ROOT DIAM: 3.9 CM
BH CV ECHO MEAS - AO V2 MAX: 138.5 CM/SEC
BH CV ECHO MEAS - AO V2 VTI: 26.7 CM
BH CV ECHO MEAS - AVA(I,D): 1.61 CM2
BH CV ECHO MEAS - EDV(CUBED): 95 ML
BH CV ECHO MEAS - EDV(MOD-SP4): 57.4 ML
BH CV ECHO MEAS - EF(MOD-SP4): 63.4 %
BH CV ECHO MEAS - ESV(CUBED): 40.9 ML
BH CV ECHO MEAS - ESV(MOD-SP4): 21 ML
BH CV ECHO MEAS - FS: 24.5 %
BH CV ECHO MEAS - IVS/LVPW: 1.08 CM
BH CV ECHO MEAS - IVSD: 1.31 CM
BH CV ECHO MEAS - LA DIMENSION(2D): 5.1 CM
BH CV ECHO MEAS - LV DIASTOLIC VOL/BSA (35-75): 33.2 CM2
BH CV ECHO MEAS - LV MASS(C)D: 217.2 GRAMS
BH CV ECHO MEAS - LV MAX PG: 2.22 MMHG
BH CV ECHO MEAS - LV MEAN PG: 1.26 MMHG
BH CV ECHO MEAS - LV SYSTOLIC VOL/BSA (12-30): 12.1 CM2
BH CV ECHO MEAS - LV V1 MAX: 74.4 CM/SEC
BH CV ECHO MEAS - LV V1 VTI: 17.3 CM
BH CV ECHO MEAS - LVIDD: 4.6 CM
BH CV ECHO MEAS - LVIDS: 3.4 CM
BH CV ECHO MEAS - LVOT AREA: 2.49 CM2
BH CV ECHO MEAS - LVOT DIAM: 1.78 CM
BH CV ECHO MEAS - LVPWD: 1.21 CM
BH CV ECHO MEAS - MV A MAX VEL: 108.6 CM/SEC
BH CV ECHO MEAS - MV DEC SLOPE: 750.9 CM/SEC2
BH CV ECHO MEAS - MV DEC TIME: 0.13 MSEC
BH CV ECHO MEAS - MV E MAX VEL: 96.4 CM/SEC
BH CV ECHO MEAS - MV E/A: 0.89
BH CV ECHO MEAS - MV MAX PG: 3.9 MMHG
BH CV ECHO MEAS - MV MEAN PG: 2.02 MMHG
BH CV ECHO MEAS - MV V2 VTI: 18.9 CM
BH CV ECHO MEAS - MVA(VTI): 2.28 CM2
BH CV ECHO MEAS - PA ACC TIME: 0.11 SEC
BH CV ECHO MEAS - PA PR(ACCEL): 29.2 MMHG
BH CV ECHO MEAS - PA V2 MAX: 160.3 CM/SEC
BH CV ECHO MEAS - RAP SYSTOLE: 8 MMHG
BH CV ECHO MEAS - RV MAX PG: 8.3 MMHG
BH CV ECHO MEAS - RV V1 MAX: 143.7 CM/SEC
BH CV ECHO MEAS - RV V1 VTI: 31.2 CM
BH CV ECHO MEAS - RVDD: 4.2 CM
BH CV ECHO MEAS - RVSP: 38.5 MMHG
BH CV ECHO MEAS - SI(MOD-SP4): 21 ML/M2
BH CV ECHO MEAS - SV(LVOT): 43.1 ML
BH CV ECHO MEAS - SV(MOD-SP4): 36.4 ML
BH CV ECHO MEAS - TR MAX PG: 30.5 MMHG
BH CV ECHO MEAS - TR MAX VEL: 275.7 CM/SEC
GLUCOSE BLDC GLUCOMTR-MCNC: 104 MG/DL (ref 70–105)
GLUCOSE BLDC GLUCOMTR-MCNC: 106 MG/DL (ref 70–105)
GLUCOSE BLDC GLUCOMTR-MCNC: 134 MG/DL (ref 70–105)
GLUCOSE BLDC GLUCOMTR-MCNC: 221 MG/DL (ref 70–105)
MAXIMAL PREDICTED HEART RATE: 161 BPM
STRESS TARGET HR: 137 BPM
TROPONIN T SERPL-MCNC: <0.01 NG/ML (ref 0–0.03)

## 2022-05-20 PROCEDURE — 93306 TTE W/DOPPLER COMPLETE: CPT | Performed by: INTERNAL MEDICINE

## 2022-05-20 PROCEDURE — 94640 AIRWAY INHALATION TREATMENT: CPT

## 2022-05-20 PROCEDURE — 99214 OFFICE O/P EST MOD 30 MIN: CPT | Performed by: INTERNAL MEDICINE

## 2022-05-20 PROCEDURE — G0378 HOSPITAL OBSERVATION PER HR: HCPCS

## 2022-05-20 PROCEDURE — 94799 UNLISTED PULMONARY SVC/PX: CPT

## 2022-05-20 PROCEDURE — 94726 PLETHYSMOGRAPHY LUNG VOLUMES: CPT

## 2022-05-20 PROCEDURE — 82962 GLUCOSE BLOOD TEST: CPT

## 2022-05-20 PROCEDURE — 84484 ASSAY OF TROPONIN QUANT: CPT | Performed by: EMERGENCY MEDICINE

## 2022-05-20 PROCEDURE — 94729 DIFFUSING CAPACITY: CPT

## 2022-05-20 PROCEDURE — 71275 CT ANGIOGRAPHY CHEST: CPT

## 2022-05-20 PROCEDURE — 63710000001 INSULIN ISOPHANE & REGULAR PER 5 UNITS: Performed by: PHYSICIAN ASSISTANT

## 2022-05-20 PROCEDURE — 0 IOPAMIDOL PER 1 ML: Performed by: EMERGENCY MEDICINE

## 2022-05-20 PROCEDURE — 93306 TTE W/DOPPLER COMPLETE: CPT

## 2022-05-20 PROCEDURE — 94060 EVALUATION OF WHEEZING: CPT

## 2022-05-20 RX ORDER — BUDESONIDE 0.5 MG/2ML
1 INHALANT ORAL
Status: DISCONTINUED | OUTPATIENT
Start: 2022-05-20 | End: 2022-05-21 | Stop reason: HOSPADM

## 2022-05-20 RX ORDER — DEXTROSE MONOHYDRATE 25 G/50ML
25 INJECTION, SOLUTION INTRAVENOUS
Status: DISCONTINUED | OUTPATIENT
Start: 2022-05-20 | End: 2022-05-21 | Stop reason: HOSPADM

## 2022-05-20 RX ORDER — INSULIN LISPRO 100 [IU]/ML
0-14 INJECTION, SOLUTION INTRAVENOUS; SUBCUTANEOUS
Status: DISCONTINUED | OUTPATIENT
Start: 2022-05-20 | End: 2022-05-21 | Stop reason: HOSPADM

## 2022-05-20 RX ORDER — INSULIN LISPRO 100 [IU]/ML
0-14 INJECTION, SOLUTION INTRAVENOUS; SUBCUTANEOUS AS NEEDED
Status: DISCONTINUED | OUTPATIENT
Start: 2022-05-20 | End: 2022-05-21 | Stop reason: HOSPADM

## 2022-05-20 RX ORDER — OLANZAPINE 10 MG/2ML
1 INJECTION, POWDER, LYOPHILIZED, FOR SOLUTION INTRAMUSCULAR
Status: DISCONTINUED | OUTPATIENT
Start: 2022-05-20 | End: 2022-05-21 | Stop reason: HOSPADM

## 2022-05-20 RX ORDER — ARFORMOTEROL TARTRATE 15 UG/2ML
15 SOLUTION RESPIRATORY (INHALATION)
Status: DISCONTINUED | OUTPATIENT
Start: 2022-05-20 | End: 2022-05-21 | Stop reason: HOSPADM

## 2022-05-20 RX ORDER — NICOTINE POLACRILEX 4 MG
15 LOZENGE BUCCAL
Status: DISCONTINUED | OUTPATIENT
Start: 2022-05-20 | End: 2022-05-21 | Stop reason: HOSPADM

## 2022-05-20 RX ORDER — BISACODYL 5 MG/1
10 TABLET, DELAYED RELEASE ORAL DAILY PRN
Status: DISCONTINUED | OUTPATIENT
Start: 2022-05-20 | End: 2022-05-21 | Stop reason: HOSPADM

## 2022-05-20 RX ORDER — ALBUTEROL SULFATE 90 UG/1
2 AEROSOL, METERED RESPIRATORY (INHALATION) ONCE
Status: COMPLETED | OUTPATIENT
Start: 2022-05-20 | End: 2022-05-20

## 2022-05-20 RX ADMIN — IOPAMIDOL 100 ML: 755 INJECTION, SOLUTION INTRAVENOUS at 15:21

## 2022-05-20 RX ADMIN — ROSUVASTATIN CALCIUM 20 MG: 10 TABLET, FILM COATED ORAL at 00:00

## 2022-05-20 RX ADMIN — INSULIN HUMAN 30 UNITS: 100 INJECTION, SUSPENSION SUBCUTANEOUS at 18:04

## 2022-05-20 RX ADMIN — BUDESONIDE 0.5 MG: 0.5 SUSPENSION RESPIRATORY (INHALATION) at 19:59

## 2022-05-20 RX ADMIN — BISACODYL 10 MG: 5 TABLET, COATED ORAL at 18:24

## 2022-05-20 RX ADMIN — ROSUVASTATIN CALCIUM 20 MG: 10 TABLET, FILM COATED ORAL at 20:32

## 2022-05-20 RX ADMIN — ALBUTEROL SULFATE 2 PUFF: 108 INHALANT RESPIRATORY (INHALATION) at 14:20

## 2022-05-20 RX ADMIN — Medication 5 MG: at 20:32

## 2022-05-20 RX ADMIN — ARFORMOTEROL TARTRATE 15 MCG: 15 SOLUTION RESPIRATORY (INHALATION) at 19:59

## 2022-05-20 NOTE — NURSING NOTE
Pt verbalized she does not want to see Dr. Valentino cardiologist who she had seen in the past. Requested for the cardiologist who is on call.

## 2022-05-20 NOTE — NURSING NOTE
Pt talks at great length bout her health, family issues, pt emotional when discussing health   Universal Safety Interventions

## 2022-05-20 NOTE — CASE MANAGEMENT/SOCIAL WORK
Social Work Assessment  [facility]     Patient Name: Gladys Garrison  MRN: [id number]  Today's Date: 5/20/2022    Admit Date: 5/19/2022     Discharge Needs Assessment     Row Name 05/20/22 1515       Discharge Needs Assessment    Concerns to be Addressed mental health    Concerns Comments SW noted in chart review that pt is having difficulty dealing with health and family issues. SW met with pt at bedside in room 105 to inquire further. Pt sitting up in bed, no visitors present. SW introduced self and role. Pt shared that she has terminal cancer, has 2 sons and grandchildren, and one of her sons hasn’t spoken to her since October 2021 over a miscommunication about the care of her grandchildren. Pt tearful throughout interaction, and she stated she is afraid she is going to die while her family is “torn apart.” SW shared local counseling resources with pt, as well as contact info for a therapist with a specialty in counseling people with chronic health conditions. Pt thanked this writer and denies further needs at this time.              Met with patient in room wearing PPE: mask.      Maintained distance greater than six feet and spent less than 15 minutes in the room.      JENNIFER Demarco, W  Medical Social Worker  Ph [phone]  Fax [phone]  [email or web address]

## 2022-05-20 NOTE — PLAN OF CARE
Problem: Adult Inpatient Plan of Care  Goal: Plan of Care Review  5/20/2022 0259 by Hilary Yee RN  Outcome: Ongoing, Progressing  5/20/2022 0259 by Hilary Yee RN  Outcome: Ongoing, Progressing  Flowsheets (Taken 5/20/2022 0259)  Progress: no change  Plan of Care Reviewed With: patient  Outcome Evaluation: pt admitted with shortness of breath, ches pain.  Goal: Patient-Specific Goal (Individualized)  5/20/2022 0259 by Hilary Yee RN  Outcome: Ongoing, Progressing  5/20/2022 0259 by Hilary Yee RN  Outcome: Ongoing, Progressing  Goal: Absence of Hospital-Acquired Illness or Injury  5/20/2022 0259 by Hilary Yee RN  Outcome: Ongoing, Progressing  5/20/2022 0259 by Hilary Yee RN  Outcome: Ongoing, Progressing  Intervention: Identify and Manage Fall Risk  Recent Flowsheet Documentation  Taken 5/20/2022 0100 by Hilary Yee RN  Safety Promotion/Fall Prevention:   safety round/check completed   nonskid shoes/slippers when out of bed   lighting adjusted  Taken 5/19/2022 2216 by Hilary Yee RN  Safety Promotion/Fall Prevention:   safety round/check completed   nonskid shoes/slippers when out of bed   mobility aid in reach   lighting adjusted  Intervention: Prevent Skin Injury  Recent Flowsheet Documentation  Taken 5/19/2022 2216 by Hilary Yee RN  Body Position: sitting up in bed  Intervention: Prevent and Manage VTE (Venous Thromboembolism) Risk  Recent Flowsheet Documentation  Taken 5/20/2022 0100 by Hilary Yee RN  Activity Management: up ad alex  Taken 5/19/2022 2216 by Hilary Yee RN  Activity Management:   up ad alex   standing at bedside   ambulated in room  Intervention: Prevent Infection  Recent Flowsheet Documentation  Taken 5/20/2022 0100 by Hilary Yee RN  Infection Prevention: rest/sleep promoted  Taken 5/19/2022 2216 by Hilary Yee RN  Infection Prevention:   rest/sleep promoted   single patient room  provided  Goal: Optimal Comfort and Wellbeing  5/20/2022 0259 by Hilary Yee RN  Outcome: Ongoing, Progressing  5/20/2022 0259 by Hilary Yee RN  Outcome: Ongoing, Progressing  Intervention: Provide Person-Centered Care  Recent Flowsheet Documentation  Taken 5/19/2022 2216 by Hilary Yee RN  Trust Relationship/Rapport:   care explained   empathic listening provided   questions answered   reassurance provided  Goal: Readiness for Transition of Care  5/20/2022 0259 by Hilary Yee RN  Outcome: Ongoing, Progressing  5/20/2022 0259 by Hilary Yee RN  Outcome: Ongoing, Progressing  Intervention: Mutually Develop Transition Plan  Recent Flowsheet Documentation  Taken 5/19/2022 2243 by Hilary Yee RN  Transportation Anticipated: agency  Patient/Family Anticipated Services at Transition: none  Patient/Family Anticipates Transition to: home  Taken 5/19/2022 2240 by Hilary Yee RN  Equipment Currently Used at Home:   none   cpap     Problem: Chest Pain  Goal: Resolution of Chest Pain Symptoms  5/20/2022 0259 by Hilary Yee RN  Outcome: Ongoing, Progressing  5/20/2022 0259 by Hilary Yee RN  Outcome: Ongoing, Progressing     Problem: Activity and Energy Impairment (Anxiety Signs/Symptoms)  Goal: Optimized Energy Level (Anxiety Signs/Symptoms)  5/20/2022 0259 by Hilary Yee RN  Outcome: Ongoing, Progressing  5/20/2022 0259 by Hilary Yee RN  Outcome: Ongoing, Progressing     Problem: Cognitive Impairment (Anxiety Signs/Symptoms)  Goal: Optimized Cognitive Function (Anxiety Signs/Symptoms)  5/20/2022 0259 by Hilary Yee RN  Outcome: Ongoing, Progressing  5/20/2022 0259 by Hilary Yee RN  Outcome: Ongoing, Progressing     Problem: Mood Impairment (Anxiety Signs/Symptoms)  Goal: Improved Mood Symptoms (Anxiety Signs/Symptoms)  5/20/2022 0259 by Hilary Yee RN  Outcome: Ongoing, Progressing  5/20/2022 0259 by Joselito  ALIREZA Richard  Outcome: Ongoing, Progressing     Problem: Sleep Impairment (Anxiety Signs/Symptoms)  Goal: Improved Sleep (Anxiety Signs/Symptoms)  5/20/2022 0259 by Hilary Yee RN  Outcome: Ongoing, Progressing  5/20/2022 0259 by Hilary Yee RN  Outcome: Ongoing, Progressing     Problem: Social, Occupational or Functional Impairment (Anxiety Signs/Symptoms)  Goal: Enhanced Social, Occupational or Functional Skills (Anxiety Signs/Symptoms)  5/20/2022 0259 by Hilary Yee RN  Outcome: Ongoing, Progressing  5/20/2022 0259 by Hilary Yee RN  Outcome: Ongoing, Progressing  Intervention: Promote Social, Occupational and Functional Ability  Recent Flowsheet Documentation  Taken 5/19/2022 2216 by Hilary Yee RN  Trust Relationship/Rapport:   care explained   empathic listening provided   questions answered   reassurance provided     Problem: Somatic Disturbance (Anxiety Signs/Symptoms)  Goal: Improved Somatic Symptoms (Anxiety Signs/Symptoms)  5/20/2022 0259 by Hilary Yee RN  Outcome: Ongoing, Progressing  5/20/2022 0259 by Hilary Yee RN  Outcome: Ongoing, Progressing   Goal Outcome Evaluation:

## 2022-05-20 NOTE — PLAN OF CARE
Goal Outcome Evaluation:  Plan of Care Reviewed With: patient           Outcome Evaluation: Pt c/o shortness of breath with exertion and talking. Incentive spirometer given. Breathing treatments ordered. PFT and CT chest ordered. Kutmah and Manchi following. Pt able to ambulate to bathroom multiple times today with no assistance. Pt having emotional spells when talking about her history or her family. Social work following. Awaiting ECHO to be read. Vitals WNL. Will continue to monitor.

## 2022-05-20 NOTE — NURSING NOTE
Spouse at bedside. Patient became real anxious, tearful, unable to catch her breath wanting to leave AMA. Was able to calm the patient down, and speak with the patient about what it means to leave AMA and the importance to stay to get further treatment and answers. Patient agreed to stay.   Pt wanting a stool softener, orders given to RN from Den[facility] HEATON via phone.

## 2022-05-20 NOTE — CONSULTS
PULMONARY CRITICAL CARE CONSULT NOTE      PATIENT IDENTIFICATION:  Name: Gladys Garrison  MRN: [id number]  :  1960     Age: 61 y.o.  Sex: female        DATE OF CONSULTATION:  2022  PRIMARY CARE PHYSICIAN    Angelica Rodriguez MD                  CHIEF COMPLAINT: Dizziness exertion    History of Present Illness:   Gladys Garrison is a 61 y.o. female withhistory of tetralogy of Fallot with multiple open heart surgery during her childhood, diabetes mellitus type 2, hepatitis C, breast cancer, hypertension, and hyperlipidemia, came with worsening dyspnea exertion for the last week even when she is talking, and fatigue tiredness no fever no chills no nausea no vomiting, no change in hearing paralysis or her bowel habits, no skin rash or itching      Review of Systems:   Constitutional:  As above   Eyes: negative   ENT/oropharynx: negative   Cardiovascular: negative   Respiratory:  As above   Gastrointestinal: negative   Genitourinary: negative   Neurological: negative   Musculoskeletal: negative   Integument/breast: negative   Endocrine: negative   Allergic/Immunologic: negative     Past Medical History:  Past Medical History:   Diagnosis Date   • Allergic    • Bone cancer (HCC)     METASTATIC BONE   • Bradycardia     SECONDARY TO ABLATION   • Breast cancer (HCC) 2017    mets to lymph nodes; did not do radiation   • Cancer of unknown origin (HCC)    • Compression fx, thoracic spine, open, initial encounter (HCC)    • Coronary artery disease    • COVID-19 2021   • Diabetes mellitus (HCC)    • Heart disease, unspecified    • Hepatitis C     RESOLVED WITH MEDICATION   • Hyperlipidemia    • Hypertension    • Obesity (BMI 30-39.9) 2021   • Sleep apnea     no machine   • Tachycardia     ATRIAL   • Type 2 diabetes mellitus (HCC) 2017       Past Surgical History:  Past Surgical History:   Procedure Laterality Date   • BACK SURGERY      neck X 2   • BREAST RECONSTRUCTION Bilateral    • CARDIAC  ABLATION       atrial tachycardia x 5 ablations    • CARDIAC CATHETERIZATION     • CARDIAC SURGERY      stent placed in aorta   • CARDIAC SURGERY      6 surgeries as baby    • CERVICAL FUSION ANTERIOR WITH ARTIFICIAL DISCECTOMY IMPLANTATION N/A 2020    Procedure: C4 VERTEBRECTOMY AND ANTERIOR CERIVCAL DISCECTOMY WITH FUSION OF CERVICAL THREE THROUGH FIVE WITH REMOVAL OF HARDWARE C5-C6;  Surgeon: Mark Kaba MD;  Location: [facility] MAIN OR;  Service: Neurosurgery;  Laterality: N/A;   •  SECTION      x2   • COLONOSCOPY N/A 10/23/2020    Procedure: COLONOSCOPY WITH POLYPECTOMY X6;  Surgeon: Stanley Bourne MD;  Location: [facility] ENDOSCOPY;  Service: Gastroenterology;  Laterality: N/A;  POLYPS, INTERNAL HEMORRHOIDS   • ENDOMETRIAL ABLATION      REMOVAL SCAR TISSUE UTERINE   • ENDOSCOPY N/A 10/23/2020    Procedure: ESOPHAGOGASTRODUODENOSCOPY WITH BIOPSY X 1 AREA;  Surgeon: Stanley Bourne MD;  Location: [facility] ENDOSCOPY;  Service: Gastroenterology;  Laterality: N/A;  GASTRITIS, ESOPHAGITIS, HIATAL HERNIA   • KNEE SURGERY     • MASTECTOMY Bilateral    • NECK SURGERY     • PACEMAKER IMPLANTATION     • PAIN PUMP INSERTION/REVISION N/A 2022    Procedure: PAIN PUMP INSERTION AND INTRATHECAL CATHETER PLACEMENT;  Surgeon: Chuck Hunter MD;  Location: [facility] MA[facility] OR;  Service: Pain Management;  Laterality: N/A;        Family History:  Family History   Problem Relation Age of Onset   • Heart disease Mother    • Stroke Mother    • Lung cancer Mother    • Aneurysm Father    • Diabetes Sister    • No Known Problems Brother    • No Known Problems Brother    • Diabetes type I Half-Sister    • Thyroid cancer Half-Sister    • Cancer Maternal Aunt    • Heart attack Sister    • Thyroid disease Sister    • No Known Problems Sister         Social History:   Social History     Tobacco Use   • Smoking status: Never Smoker   • Smokeless tobacco: Never Used   Substance Use Topics   • Alcohol  use: Yes     Alcohol/week: 1.0 standard drink     Types: 1 Glasses of wine per week     Comment: rare        Allergies:  Allergies   Allergen Reactions   • Promethazine Other (See Comments)     Hyperactive mean   • Tape Other (See Comments)     .blisters         Home Meds:  Medications Prior to Admission   Medication Sig Dispense Refill Last Dose   • anastrozole (ARIMIDEX) 1 MG tablet Take 1 tablet by mouth Daily. 30 tablet 6 5/19/2022 at Unknown time   • aspirin 81 MG chewable tablet Chew 1 tablet Daily. 30 tablet 1 5/19/2022 at Unknown time   • Blood Glucose Monitoring Suppl (Accu-Chek Araceli) device Use as instructed   To test blood sugar bid 1 each 0 5/18/2022 at Unknown time   • Calcium Carbonate-Vit D-Min (Calcium 600+D Plus Minerals) 600-400 MG-UNIT chewable tablet Chew 600 mg 2 (Two) Times a Day. (Patient taking differently: Chew 600 mg 2 (Two) Times a Day. GUMMIES) 60 each 6 5/19/2022 at Unknown time   • insulin NPH-insulin regular (humuLIN 70/30,novoLIN 70/30) (70-30) 100 UNIT/ML injection Inject 40 Units under the skin into the appropriate area as directed Every Morning.   5/18/2022 at Unknown time   • insulin NPH-insulin regular (humuLIN 70/30,novoLIN 70/30) (70-30) 100 UNIT/ML injection Inject 30 Units under the skin into the appropriate area as directed Every Evening.   5/18/2022 at Unknown time   • lisinopril (PRINIVIL,ZESTRIL) 20 MG tablet Take 20 mg by mouth Daily.   5/19/2022 at Unknown time   • oxyCODONE-acetaminophen (PERCOCET) 5-325 MG per tablet Take 1 tablet by mouth Every 8 (Eight) Hours As Needed for Severe Pain . 21 tablet 0 5/18/2022 at Unknown time   • Palbociclib (Ibrance) 125 MG capsule capsule Take 1 capsule by mouth Daily. Take on days 1-21 with food and then off for 7 days. 21 capsule 6 5/18/2022 at Unknown time   • rosuvastatin (Crestor) 20 MG tablet Take 1 tablet by mouth Every Night. 90 tablet 0 5/18/2022 at Unknown time   • acetaminophen (TYLENOL) 650 MG 8 hr tablet Take 650 mg by  "mouth As Needed for Mild Pain . TAKES BETWEEN PAIN MEDS      • glucose blood (Accu-Chek Araceli Plus) test strip Use as instructed   To test bid 200 each 3    • ibuprofen (ADVIL,MOTRIN) 400 MG tablet Take 400 mg by mouth As Needed for Mild Pain . IN BETWEEN PAIN  MEDS      • Lancets (accu-chek soft touch) lancets Test bid 200 each 3        Objective:  tMax 24 hrs: Temp (24hrs), Av.1 °F (36.7 °C), Min:97.7 °F (36.5 °C), Max:98.3 °F (36.8 °C)      Vitals Ranges:   Temp:  [97.7 °F (36.5 °C)-98.3 °F (36.8 °C)] 98.3 °F (36.8 °C)  Heart Rate:  [68-86] 70  Resp:  [17-20] 20  BP: ()/(61-86) 135/78    Intake and Output Last 3 Shifts:   No intake/output data recorded.    Exam:  /78 (BP Location: Right arm, Patient Position: Lying)   Pulse 70   Temp 98.3 °F (36.8 °C) (Oral)   Resp 20   Ht 154.9 cm (61\")   Wt 73.9 kg (163 lb)   LMP  (LMP Unknown)   SpO2 94%   BMI 30.80 kg/m²       22  2216 22  0731   Weight: 74.2 kg (163 lb 9.6 oz) 73.9 kg (163 lb)     General Appearance:      HEENT:  Normocephalic, without obvious abnormality, atraumatic. Conjunctivae/corneas clear.  Normal external ear canals. Nares normal, no drainage.  Neck:  Supple, symmetrical, trachea midline. No JVD.  Lungs /Chest wall:   Bilateral basal rhonchi, respirations unlabored, symmetrical wall movement.     Heart:  Regular rate and rhythm, systolic murmur PMI left sternal border  Abdomen: Soft, nontender, no masses, no organomegaly.    Extremities: Trace edema, no clubbing or cyanosis        Data Review:  All labs (24hrs):   Recent Results (from the past 24 hour(s))   ECG 12 Lead    Collection Time: 22  5:15 PM   Result Value Ref Range    QT Interval 472 ms   Gold Top - SST    Collection Time: 22  5:59 PM   Result Value Ref Range    Extra Tube Hold for add-ons.    Light Blue Top    Collection Time: 22  5:59 PM   Result Value Ref Range    Extra Tube Hold for add-ons.    Comprehensive Metabolic Panel    " Collection Time: 05/19/22  5:59 PM    Specimen: Blood   Result Value Ref Range    Glucose 61 (L) 65 - 99 mg/dL    BUN 16 6 - 20 mg/dL    Creatinine 0.77 0.57 - 1.00 mg/dL    Sodium 141 136 - 145 mmol/L    Potassium 4.0 3.5 - 5.2 mmol/L    Chloride 105 98 - 107 mmol/L    CO2 23.0 22.0 - 29.0 mmol/L    Calcium 10.0 8.6 - 10.5 mg/dL    Total Protein 7.7 6.0 - 8.5 g/dL    Albumin 4.40 3.50 - 5.20 g/dL    ALT (SGPT) 18 1 - 33 U/L    AST (SGOT) 24 1 - 32 U/L    Alkaline Phosphatase 99 39 - 117 U/L    Total Bilirubin 0.3 0.0 - 1.2 mg/dL    Globulin 3.3 gm/dL    A/G Ratio 1.3 g/dL    BUN/Creatinine Ratio 20.8 7.0 - 25.0    Anion Gap 13.0 5.0 - 15.0 mmol/L    eGFR 89.0 >60.0 mL/min/1.73   Protime-INR    Collection Time: 05/19/22  5:59 PM    Specimen: Blood   Result Value Ref Range    Protime 11.0 9.6 - 11.7 Seconds    INR 1.07 0.93 - 1.10   aPTT    Collection Time: 05/19/22  5:59 PM    Specimen: Blood   Result Value Ref Range    PTT 30.0 (L) 61.0 - 76.5 seconds   BNP    Collection Time: 05/19/22  5:59 PM    Specimen: Blood   Result Value Ref Range    proBNP 488.4 0.0 - 900.0 pg/mL   Troponin    Collection Time: 05/19/22  5:59 PM    Specimen: Blood   Result Value Ref Range    Troponin T <0.010 0.000 - 0.030 ng/mL   Mononucleosis Screen    Collection Time: 05/19/22  5:59 PM    Specimen: Blood   Result Value Ref Range    Monospot Negative Negative   CBC Auto Differential    Collection Time: 05/19/22  5:59 PM    Specimen: Blood   Result Value Ref Range    WBC 5.70 3.40 - 10.80 10*3/mm3    RBC 4.60 3.77 - 5.28 10*6/mm3    Hemoglobin 13.2 12.0 - 15.9 g/dL    Hematocrit 41.7 34.0 - 46.6 %    MCV 90.5 79.0 - 97.0 fL    MCH 28.8 26.6 - 33.0 pg    MCHC 31.8 31.5 - 35.7 g/dL    RDW 14.1 12.3 - 15.4 %    RDW-SD 44.6 37.0 - 54.0 fl    MPV 8.2 6.0 - 12.0 fL    Platelets 113 (L) 140 - 450 10*3/mm3    Neutrophil % 67.4 42.7 - 76.0 %    Lymphocyte % 22.6 19.6 - 45.3 %    Monocyte % 6.8 5.0 - 12.0 %    Eosinophil % 2.6 0.3 - 6.2 %    Basophil  % 0.6 0.0 - 1.5 %    Neutrophils, Absolute 3.80 1.70 - 7.00 10*3/mm3    Lymphocytes, Absolute 1.30 0.70 - 3.10 10*3/mm3    Monocytes, Absolute 0.40 0.10 - 0.90 10*3/mm3    Eosinophils, Absolute 0.10 0.00 - 0.40 10*3/mm3    Basophils, Absolute 0.00 0.00 - 0.20 10*3/mm3    nRBC 0.1 0.0 - 0.2 /100 WBC   D-dimer, Quantitative    Collection Time: 05/19/22  5:59 PM    Specimen: Blood   Result Value Ref Range    D-Dimer, Quantitative 0.46 0.00 - 0.59 mg/L (FEU)   Respiratory Panel PCR w/COVID-19(SARS-CoV-2) ELISHA/MARQUEZ/HAMMAD/PAD/COR/MAD/SHARDA In-House, NP Swab in UTM/VTM, 3-4 HR TAT - Swab, Nasopharynx    Collection Time: 05/19/22  6:21 PM    Specimen: Nasopharynx; Swab   Result Value Ref Range    ADENOVIRUS, PCR Not Detected Not Detected    Coronavirus 229E Not Detected Not Detected    Coronavirus HKU1 Not Detected Not Detected    Coronavirus NL63 Not Detected Not Detected    Coronavirus OC43 Not Detected Not Detected    COVID19 Not Detected Not Detected - Ref. Range    Human Metapneumovirus Not Detected Not Detected    Human Rhinovirus/Enterovirus Not Detected Not Detected    Influenza A PCR Not Detected Not Detected    Influenza B PCR Not Detected Not Detected    Parainfluenza Virus 1 Not Detected Not Detected    Parainfluenza Virus 2 Not Detected Not Detected    Parainfluenza Virus 3 Not Detected Not Detected    Parainfluenza Virus 4 Not Detected Not Detected    RSV, PCR Not Detected Not Detected    Bordetella pertussis pcr Not Detected Not Detected    Bordetella parapertussis PCR Not Detected Not Detected    Chlamydophila pneumoniae PCR Not Detected Not Detected    Mycoplasma pneumo by PCR Not Detected Not Detected   Urinalysis With Microscopic If Indicated (No Culture) - Urine, Clean Catch    Collection Time: 05/19/22  8:30 PM    Specimen: Urine, Clean Catch   Result Value Ref Range    Color, UA Yellow Yellow, Straw    Appearance, UA Clear Clear    pH, UA 7.0 5.0 - 8.0    Specific Gravity, UA 1.016 1.005 - 1.030    Glucose,  UA Negative Negative    Ketones, UA Negative Negative    Bilirubin, UA Negative Negative    Blood, UA Negative Negative    Protein, UA Negative Negative    Leuk Esterase, UA Trace (A) Negative    Nitrite, UA Negative Negative    Urobilinogen, UA 0.2 E.U./dL 0.2 - 1.0 E.U./dL   Urinalysis, Microscopic Only - Urine, Clean Catch    Collection Time: 05/19/22  8:30 PM    Specimen: Urine, Clean Catch   Result Value Ref Range    RBC, UA 0-2 (A) None Seen /HPF    WBC, UA 0-2 (A) None Seen /HPF    Bacteria, UA None Seen None Seen /HPF    Squamous Epithelial Cells, UA 0-2 None Seen, 0-2 /HPF    Hyaline Casts, UA None Seen None Seen /LPF    Methodology Automated Microscopy    POC Glucose Once    Collection Time: 05/19/22 10:54 PM    Specimen: Blood   Result Value Ref Range    Glucose 117 (H) 70 - 105 mg/dL   Troponin    Collection Time: 05/20/22 12:59 AM    Specimen: Blood   Result Value Ref Range    Troponin T <0.010 0.000 - 0.030 ng/mL   POC Glucose Once    Collection Time: 05/20/22  7:54 AM    Specimen: Blood   Result Value Ref Range    Glucose 104 70 - 105 mg/dL   Adult Transthoracic Echo Complete W/ Cont if Necessary Per Protocol    Collection Time: 05/20/22  8:10 AM   Result Value Ref Range    Target HR (85%) 137 bpm    Max. Pred. HR (100%) 161 bpm    Ao root diam 3.9 cm    Ao pk eric 138.5 cm/sec    Ao V2 VTI 26.7 cm    CAROLE(I,D) 1.61 cm2    EDV(cubed) 95.0 ml    EDV(MOD-sp4) 57.4 ml    EF(MOD-bp) 63.0 %    EF(MOD-sp4) 63.4 %    ESV(cubed) 40.9 ml    ESV(MOD-sp4) 21.0 ml    IVS/LVPW 1.08 cm    LV mass(C)d 217.2 grams    LV V1 max PG 2.22 mmHg    LV V1 mean PG 1.26 mmHg    LV V1 max 74.4 cm/sec    LVPWd 1.21 cm    MV dec slope 750.9 cm/sec2    MV dec time 0.13 msec    MV V2 VTI 18.9 cm    MVA(VTI) 2.28 cm2    PA acc time 0.11 sec    PA pr(Accel) 29.2 mmHg    PA V2 max 160.3 cm/sec    RAP systole 8.0 mmHg    RV V1 max PG 8.3 mmHg    RV V1 max 143.7 cm/sec    RV V1 VTI 31.2 cm    RVIDd 4.2 cm    RVSP(TR) 38.5 mmHg     SI(MOD-sp4) 21.0 ml/m2    SV(LVOT) 43.1 ml    SV(MOD-sp4) 36.4 ml    TR max PG 30.5 mmHg    Ao max PG 7.7 mmHg    Ao mean PG 4.5 mmHg    FS 24.5 %    IVSd 1.31 cm    LA dimension(2D) 5.1 cm    LV V1 VTI 17.3 cm    LVIDd 4.6 cm    LVIDs 3.4 cm    LVOT area 2.49 cm2    LVOT diam 1.78 cm    MV E/A 0.89     MV max PG 3.9 mmHg    MV mean PG 2.02 mmHg    MV A max eric 108.6 cm/sec    MV E max eric 96.4 cm/sec    TR max eric 275.7 cm/sec    LV Perez Vol (BSA corrected) 33.2 cm2    LV Sys Vol (BSA corrected) 12.1 cm2   POC Glucose Once    Collection Time: 05/20/22 11:32 AM    Specimen: Blood   Result Value Ref Range    Glucose 134 (H) 70 - 105 mg/dL        Imaging:  [unfilled]    ASSESSMENT:    Dyspnea  Obstructive sleep apnea  Need to rule out post underlying cardiac etiology    PLAN:  CT chest PE protocol  PFT  Bronchodilator  Inhaled corticosteroids  Electrolytes/ glycemic control  DVT and GI prophylaxis.  Total Critical care time in direct medical management (   ) minutes, This time specifically excludes time spent performing procedures.       Nasim Fernando MD. D, ABSM.  5/20/2022  12:20 EDT

## 2022-05-20 NOTE — H&P
[facility] Observation Unit H&P    Patient Name: Gladys Garrison  : 1960  MRN: [id number]  Primary Care Physician: Angelica Rodriguez MD  Date of admission: 2022     Patient Care Team:  Angelica Rodriguez MD as PCP - General (Internal Medicine)  Demetrio Valentino MD as Consulting Physician (Cardiology)          Subjective   History Present Illness     Chief Complaint:   Chief Complaint   Patient presents with   • Shortness of Breath       Obtained from ED provider HPI on 2022:  Patient is a 61-year-old female presents to the emergency department after initially being seen at urgent care.  Patient reports she has had fatigue for the past week, rash, decreased oxygen level, shortness of breath with exertion.  Patient denies any chest pain.  No fever chills.  No recent travel.  She denies any tick bites, [place]  She is not had any fever or chills.  No headache or visual disturbances.  No abdominal pain, nausea, vomiting diarrhea.  No IV drug abuse.     2022: Patient confirms the HPI noted above including approximately 1 week history of increased fatigue which has caused some difficulty with patient's employment along with a 5 to 6-day appearance of a petechial rash as well as dyspnea specifically on exertion typically requiring approximately 1 flight of stairs to become significantly out of breath.  She denies any nausea, vomiting, fever, palpitations, syncope or near syncope.  Patient does have a history of breast cancer with mets to bone and is currently undergoing chemotherapy treatment.  She had a pain pump placed outpatient in early 2022 but beyond this denies any recent changes to her medication regimens.  No new pain is reported and chronic pain is noted at baseline.      ROS   Review of Systems   Constitutional: Positive for malaise/fatigue.   HENT: Negative.    Eyes: Negative.    Cardiovascular: Negative for chest pain, orthopnea,  palpitations and syncope.   Respiratory: Positive for shortness of breath. Negative for cough.    Endocrine: Negative.    Skin: Positive for rash. Negative for itching.   Musculoskeletal: Negative.    Gastrointestinal: Negative.    Genitourinary: Negative.    Neurological: Negative.    Psychiatric/Behavioral: Negative.          Personal History     Past Medical History:   Past Medical History:   Diagnosis Date   • Allergic    • Bone cancer (HCC)     METASTATIC BONE   • Bradycardia     SECONDARY TO ABLATION   • Breast cancer (HCC) 2017    mets to lymph nodes; did not do radiation   • Cancer of unknown origin (HCC)    • Compression fx, thoracic spine, open, initial encounter (HCC)    • Coronary artery disease    • COVID-19 2021   • Diabetes mellitus (HCC)    • Heart disease, unspecified    • Hepatitis C     RESOLVED WITH MEDICATION   • Hyperlipidemia    • Hypertension    • Obesity (BMI 30-39.9) 2021   • Sleep apnea     no machine   • Tachycardia     ATRIAL   • Type 2 diabetes mellitus (HCC) 2017       Surgical History:      Past Surgical History:   Procedure Laterality Date   • BACK SURGERY      neck X 2   • BREAST RECONSTRUCTION Bilateral    • CARDIAC ABLATION       atrial tachycardia x 5 ablations    • CARDIAC CATHETERIZATION     • CARDIAC SURGERY      stent placed in aorta   • CARDIAC SURGERY      6 surgeries as baby    • CERVICAL FUSION ANTERIOR WITH ARTIFICIAL DISCECTOMY IMPLANTATION N/A 2020    Procedure: C4 VERTEBRECTOMY AND ANTERIOR CERIVCAL DISCECTOMY WITH FUSION OF CERVICAL THREE THROUGH FIVE WITH REMOVAL OF HARDWARE C5-C6;  Surgeon: Mark Kaba MD;  Location: [facility] MAIN OR;  Service: Neurosurgery;  Laterality: N/A;   •  SECTION      x2   • COLONOSCOPY N/A 10/23/2020    Procedure: COLONOSCOPY WITH POLYPECTOMY X6;  Surgeon: Stanley Bourne MD;  Location: [facility] ENDOSCOPY;  Service: Gastroenterology;  Laterality: N/A;  POLYPS, INTERNAL HEMORRHOIDS   •  ENDOMETRIAL ABLATION      REMOVAL SCAR TISSUE UTERINE   • ENDOSCOPY N/A 10/23/2020    Procedure: ESOPHAGOGASTRODUODENOSCOPY WITH BIOPSY X 1 AREA;  Surgeon: Stanley Bourne MD;  Location: [facility] ENDOSCOPY;  Service: Gastroenterology;  Laterality: N/A;  GASTRITIS, ESOPHAGITIS, HIATAL HERNIA   • KNEE SURGERY  2000   • MASTECTOMY Bilateral    • NECK SURGERY     • PACEMAKER IMPLANTATION     • PAIN PUMP INSERTION/REVISION N/A 4/5/2022    Procedure: PAIN PUMP INSERTION AND INTRATHECAL CATHETER PLACEMENT;  Surgeon: Chuck Hunter MD;  Location: [facility] MAIN OR;  Service: Pain Management;  Laterality: N/A;           Family History: family history includes Aneurysm in her father; Cancer in her maternal aunt; Diabetes in her sister; Diabetes type I in her half-sister; Heart attack in her sister; Heart disease in her mother; Lung cancer in her mother; No Known Problems in her brother, brother, and sister; Stroke in her mother; Thyroid cancer in her half-sister; Thyroid disease in her sister. Otherwise pertinent FHx was reviewed and unremarkable.     Social History:  reports that she has never smoked. She has never used smokeless tobacco. She reports current alcohol use of about 1.0 standard drink of alcohol per week. She reports previous drug use.      Medications:  Prior to Admission medications    Medication Sig Start Date End Date Taking? Authorizing Provider   anastrozole (ARIMIDEX) 1 MG tablet Take 1 tablet by mouth Daily. 11/22/21  Yes Мария Nunez MD   aspirin 81 MG chewable tablet Chew 1 tablet Daily. 12/24/19  Yes Cedric Duval Jr., MD   Blood Glucose Monitoring Suppl (Accu-Chek Araceli) device Use as instructed   To test blood sugar bid 10/25/21  Yes Angelica Rodriguez MD   Calcium Carbonate-Vit D-Min (Calcium 600+D Plus Minerals) 600-400 MG-UNIT chewable tablet Chew 600 mg 2 (Two) Times a Day.  Patient taking differently: Chew 600 mg 2 (Two) Times a Day. GUMMIES 2/12/21  Yes Мария Nunez  MD Lian   insulin NPH-insulin regular (humuLIN 70/30,novoLIN 70/30) (70-30) 100 UNIT/ML injection Inject 40 Units under the skin into the appropriate area as directed Every Morning.   Yes Magdalene Russell MD   insulin NPH-insulin regular (humuLIN 70/30,novoLIN 70/30) (70-30) 100 UNIT/ML injection Inject 30 Units under the skin into the appropriate area as directed Every Evening.   Yes Magdalene Russell MD   lisinopril (PRINIVIL,ZESTRIL) 20 MG tablet Take 20 mg by mouth Daily.   Yes Magdalene Russell MD   oxyCODONE-acetaminophen (PERCOCET) 5-325 MG per tablet Take 1 tablet by mouth Every 8 (Eight) Hours As Needed for Severe Pain . 4/13/22  Yes Chuck Hunter MD   Palbociclib (Ibrance) 125 MG capsule capsule Take 1 capsule by mouth Daily. Take on days 1-21 with food and then off for 7 days. 12/20/21  Yes Мария Nunez MD   rosuvastatin (Crestor) 20 MG tablet Take 1 tablet by mouth Every Night. 3/9/21  Yes Preethi Vasquez APRN   acetaminophen (TYLENOL) 650 MG 8 hr tablet Take 650 mg by mouth As Needed for Mild Pain . TAKES BETWEEN PAIN MEDS    Magdalene Russell MD   glucose blood (Accu-Chek Araceli Plus) test strip Use as instructed   To test bid 10/25/21   Angelica Rodriguez MD   ibuprofen (ADVIL,MOTRIN) 400 MG tablet Take 400 mg by mouth As Needed for Mild Pain . IN BETWEEN PAIN  MEDS    Magdalene Russell MD   Lancets (accu-chek soft touch) lancets Test bid 10/25/21   Angelica Rodriguez MD       Allergies:    Allergies   Allergen Reactions   • Promethazine Other (See Comments)     Hyperactive mean   • Tape Other (See Comments)     .blisters         Objective   Objective     Vital Signs  Temp:  [97.9 °F (36.6 °C)-98.3 °F (36.8 °C)] 98.3 °F (36.8 °C)  Heart Rate:  [68-86] 70  Resp:  [20] 20  BP: ()/(61-86) 135/78  SpO2:  [92 %-97 %] 94 %  on   ;   Device (Oxygen Therapy): room air  Body mass index is 30.8 kg/m².    Physical Exam  Physical Exam  Vitals reviewed.    Constitutional:       General: She is not in acute distress.     Appearance: Normal appearance. She is normal weight. She is not ill-appearing, toxic-appearing or diaphoretic.   HENT:      Head: Normocephalic and atraumatic.      Right Ear: External ear normal.      Left Ear: External ear normal.      Nose: Nose normal.      Mouth/Throat:      Mouth: Mucous membranes are moist.   Eyes:      Extraocular Movements: Extraocular movements intact.   Cardiovascular:      Rate and Rhythm: Normal rate and regular rhythm.      Pulses: Normal pulses.      Heart sounds: Normal heart sounds.   Pulmonary:      Effort: Pulmonary effort is normal.      Breath sounds: Normal breath sounds.   Abdominal:      General: Bowel sounds are normal. There is no distension.      Palpations: Abdomen is soft.      Tenderness: There is no abdominal tenderness.   Musculoskeletal:         General: Normal range of motion.      Cervical back: Normal range of motion.   Skin:     General: Skin is warm and dry.      Capillary Refill: Capillary refill takes less than 2 seconds.      Findings: Rash present.   Neurological:      General: No focal deficit present.      Mental Status: She is alert and oriented to person, place, and time.   Psychiatric:         Mood and Affect: Mood normal.         Behavior: Behavior normal.         Thought Content: Thought content normal.         Judgment: Judgment normal.     Results Review:  I have personally reviewed most recent cardiac tracings, lab results and radiology images and interpretations and agree with findings, most notably: Troponin, proBNP, D-dimer, respiratory viral panel, Monospot, UA.    Results from last 7 days   Lab Units 05/19/22  1759   WBC 10*3/mm3 5.70   HEMOGLOBIN g/dL 13.2   HEMATOCRIT % 41.7   PLATELETS 10*3/mm3 113*   INR  1.07     Results from last 7 days   Lab Units 05/20/22  0059 05/19/22  1759   SODIUM mmol/L  --  141   POTASSIUM mmol/L  --  4.0   CHLORIDE mmol/L  --  105   CO2 mmol/L   --  23.0   BUN mg/dL  --  16   CREATININE mg/dL  --  0.77   GLUCOSE mg/dL  --  61*   CALCIUM mg/dL  --  10.0   ALT (SGPT) U/L  --  18   AST (SGOT) U/L  --  24   TROPONIN T ng/mL <0.010 <0.010   PROBNP pg/mL  --  488.4     Estimated Creatinine Clearance: 72.3 mL/min (by C-G formula based on SCr of 0.77 mg/dL).  Brief Urine Lab Results  (Last result in the past 365 days)      Color   Clarity   Blood   Leuk Est   Nitrite   Protein   CREAT   Urine HCG        05/19/22 2030 Yellow   Clear   Negative   Trace   Negative   Negative                 Microbiology Results (last 10 days)     Procedure Component Value - Date/Time    Respiratory Panel PCR w/COVID-19(SARS-CoV-2) ELISHA/MARQUEZ/HAMMAD/PAD/COR/MAD/SHARDA In-House, NP Swab in UTM/VTM, 3-4 HR TAT - Swab, Nasopharynx [[id number]]  (Normal) Collected: 05/19/22 1821    Lab Status: Final result Specimen: Swab from Nasopharynx Updated: 05/19/22 1914     ADENOVIRUS, PCR Not Detected     Coronavirus 229E Not Detected     Coronavirus HKU1 Not Detected     Coronavirus NL63 Not Detected     Coronavirus OC43 Not Detected     COVID19 Not Detected     Human Metapneumovirus Not Detected     Human Rhinovirus/Enterovirus Not Detected     Influenza A PCR Not Detected     Influenza B PCR Not Detected     Parainfluenza Virus 1 Not Detected     Parainfluenza Virus 2 Not Detected     Parainfluenza Virus 3 Not Detected     Parainfluenza Virus 4 Not Detected     RSV, PCR Not Detected     Bordetella pertussis pcr Not Detected     Bordetella parapertussis PCR Not Detected     Chlamydophila pneumoniae PCR Not Detected     Mycoplasma pneumo by PCR Not Detected    Narrative:      In the setting of a positive respiratory panel with a viral infection PLUS a negative procalcitonin without other underlying concern for bacterial infection, consider observing off antibiotics or discontinuation of antibiotics and continue supportive care. If the respiratory panel is positive for atypical bacterial infection  (Bordetella pertussis, Chlamydophila pneumoniae, or Mycoplasma pneumoniae), consider antibiotic de-escalation to target atypical bacterial infection.          ECG/EMG Results (most recent)     Procedure Component Value Units Date/Time    ECG 12 Lead [[id number]] Collected: 05/19/22 1715     Updated: 05/19/22 1716     QT Interval 472 ms     Narrative:      HEART RATE= 71  bpm  RR Interval= 841  ms  SD Interval= 216  ms  P Horizontal Axis=   deg  P Front Axis= 31  deg  QRSD Interval= 95  ms  QT Interval= 472  ms  QRS Axis= 98  deg  T Wave Axis= 102  deg  - ABNORMAL ECG -  Atrial-ventricular dual-paced complexes  Electronically Signed By:   Date and Time of Study: 2022-05-19 17:15:09    Adult Transthoracic Echo Complete W/ Cont if Necessary Per Protocol [[id number]] Resulted: 05/20/22 1001     Updated: 05/20/22 1003     Target HR (85%) 137 bpm      Max. Pred. HR (100%) 161 bpm      Ao root diam 3.9 cm      Ao pk eric 138.5 cm/sec      Ao V2 VTI 26.7 cm      CAROLE(I,D) 1.61 cm2      EDV(cubed) 95.0 ml      EDV(MOD-sp4) 57.4 ml      EF(MOD-bp) 63.0 %      EF(MOD-sp4) 63.4 %      ESV(cubed) 40.9 ml      ESV(MOD-sp4) 21.0 ml      IVS/LVPW 1.08 cm      LV mass(C)d 217.2 grams      LV V1 max PG 2.22 mmHg      LV V1 mean PG 1.26 mmHg      LV V1 max 74.4 cm/sec      LVPWd 1.21 cm      MV dec slope 750.9 cm/sec2      MV dec time 0.13 msec      MV V2 VTI 18.9 cm      MVA(VTI) 2.28 cm2      PA acc time 0.11 sec      PA pr(Accel) 29.2 mmHg      PA V2 max 160.3 cm/sec      RAP systole 8.0 mmHg      RV V1 max PG 8.3 mmHg      RV V1 max 143.7 cm/sec      RV V1 VTI 31.2 cm      RVIDd 4.2 cm      RVSP(TR) 38.5 mmHg      SI(MOD-sp4) 21.0 ml/m2      SV(LVOT) 43.1 ml      SV(MOD-sp4) 36.4 ml      TR max PG 30.5 mmHg      Ao max PG 7.7 mmHg      Ao mean PG 4.5 mmHg      FS 24.5 %      IVSd 1.31 cm      LA dimension(2D) 5.1 cm      LV V1 VTI 17.3 cm      LVIDd 4.6 cm      LVIDs 3.4 cm      LVOT area 2.49 cm2      LVOT diam 1.78 cm      MV E/A  0.89     MV max PG 3.9 mmHg      MV mean PG 2.02 mmHg      MV A max eric 108.6 cm/sec      MV E max eric 96.4 cm/sec      TR max eric 275.7 cm/sec      LV Perez Vol (BSA corrected) 33.2 cm2      LV Sys Vol (BSA corrected) 12.1 cm2     SCANNED - TELEMETRY   [[id number]] Resulted: 05/19/22     Updated: 05/20/22 1420              Results for orders placed during the hospital encounter of 02/04/21    Transthoracic Echo Complete With Contrast if Necessary Per Protocol    Interpretation Summary  Technically difficult study due to poor acoustic windows.  Normal LV size with mild septal dyskinesis, EF of probably 40-45 %  Moderate right ventricle are enlargement with hypokinetic right ventricle.  Pacemaker lead seen in right ventricle.  Normal atrial size  Aortic valve, mitral valve, tricuspid valve appears structurally normal, no significant regurgitation seen.  No pericardial effusion seen.  Proximal aorta appears normal in size.      CT Angiogram Chest With & Without Contrast    Result Date: 5/20/2022   1. Chronic fibrotic changes in both lungs. No acute infiltrates when compared with the last study. 2. Postoperative changes of prior pulmonic valve replacement, right ventricular surgery, transvenous pacemaker placement, and aortic stent graft placement. Postop changes of bilateral augmentation mammoplasty. 3. Enlargement of the right ventricle enlargement of the central pulmonary arteries suggesting significant pulmonary hypertension. 4. No evidence of pulmonary embolism.  Electronically Signed By-Christ Dowling MD On:5/20/2022 3:36 PM This report was finalized on [id number] by  Christ Dowling MD.    XR Chest 1 View    Result Date: 5/19/2022  Cardiomegaly with questionable slight pulmonary vascular congestion.  Electronically Signed By-Sterling Joaquin MD On:5/19/2022 8:10 PM This report was finalized on [id number] by  Sterling Joaquin MD.        Estimated Creatinine Clearance: 72.3 mL/min (by C-G formula based on SCr of 0.77  mg/dL).    Assessment & Plan   Assessment/Plan       Active Hospital Problems    Diagnosis  POA   • Dyspnea [R06.00]  Yes      Resolved Hospital Problems   No resolved problems to display.      Fatigue with dyspnea and rash  -Serial troponin less than 0.010  -proBNP: 48.4  -Patient was noted as hypoglycemic at 61 on presentation which improved to 117 on the evening following admission  -D-dimer: 0.46  -Respiratory viral panel and Monospot negative  -UA generally unremarkable  -Tick panel ordered in the ED and is pending  -Cardiology and pulmonology consulted in ED  -Echocardiogram pending  -CT PE protocol showed chronic fibrotic changes in both lungs with no acute infiltrates compared to previous study with postoperative changes noted with prior pulmonic valve replacement right ventricular surgery transvenous pacemaker placement and aortic stent graft placement with postop changes of bilateral augmentation from mammoplasty also noted.  Enlargement of the right ventricle and of the central pulmonary arteries suggesting pulmonary hypertension is also present with no evidence of pulmonary embolism  -Continue telemetry     Thrombocytopenia  -Platelets: 113, appear to be at patient's baseline likely related to underlying malignancy and treatment  -Monitor CBC and for signs of bleeding while admitted     Diabetes mellitus  -Glucose currently 117, was 61 on the evening of admission  -Continue Humulin 70/30  -Diabetic diet  -Monitor AC and HS     Hypertension  -Well controlled with a most recent blood pressure of 135/78  - Continue lisinopril  - Monitor while admitted     History of metastatic breast cancer  -Encourage compliance with outpatient chemotherapy     Hyperlipidemia  -Statin     Obesity (BMI: 30.80)  -Encouraged on lifestyle modifications            VTE Prophylaxis -   Mechanical Order History:      Ordered        05/19/22 2230  Place Sequential Compression Device  Once            05/19/22 2230  Maintain  Sequential Compression Device  Continuous                    Pharmalogical Order History:     None          CODE STATUS:    Code Status and Medical Interventions:   Ordered at: 05/19/22 2109     Level Of Support Discussed With:    Patient     Code Status (Patient has no pulse and is not breathing):    CPR (Attempt to Resuscitate)     Medical Interventions (Patient has pulse or is breathing):    Full Support       This patient has been examined wearing personal protective equipment.     I discussed the patient's findings and my recommendations with patient and nursing staff.      Signature:Electronically signed by Tanner Guaman PA-C, 05/20/22, 5:25 PM EDT.

## 2022-05-20 NOTE — CONSULTS
Referring Provider: Hospitalist  Reason for Consultation: Shortness of breath    Patient Care Team:  Angelica Rodriguez MD as PCP - General (Internal Medicine)  Demetrio Valentino MD as Consulting Physician (Cardiology)    Chief complaint shortness of breath    Subjective .     History of present illness:  Gladys Garrison is a 61 y.o. female who says she has history of coronary status post multiple open heart surgeries diabetes hepatitis C breast cancer hypertension hyperlipidemia presents to the hospital complains of increasing shortness of breath for the last 1 week.  Patient does not have any symptoms of chest pain.  No complains any palpitation dizziness syncope.  She has some mild swelling of the feet.  She said that she is taking her medicines regularly.  In the ER patient was noted to have higher heart rates and she had hypoxia and hence she was admitted to hospital  Review of Systems   Constitutional: Negative for fever and malaise/fatigue.   HENT: Negative for ear pain and nosebleeds.    Eyes: Negative for blurred vision and double vision.   Cardiovascular: Positive for leg swelling. Negative for chest pain, dyspnea on exertion and palpitations.   Respiratory: Positive for shortness of breath. Negative for cough.    Skin: Negative for rash.   Musculoskeletal: Negative for joint pain.   Gastrointestinal: Negative for abdominal pain, nausea and vomiting.   Neurological: Negative for focal weakness and headaches.   Psychiatric/Behavioral: Negative for depression. The patient is not nervous/anxious.    All other systems reviewed and are negative.      History  Past Medical History:   Diagnosis Date   • Allergic    • Bone cancer (HCC)     METASTATIC BONE   • Bradycardia     SECONDARY TO ABLATION   • Breast cancer (HCC) 2017    mets to lymph nodes; did not do radiation   • Cancer of unknown origin (HCC)    • Compression fx, thoracic spine, open, initial encounter (HCC)    • Coronary artery disease     • COVID-19 2021   • Diabetes mellitus (HCC)    • Heart disease, unspecified    • Hepatitis C     RESOLVED WITH MEDICATION   • Hyperlipidemia    • Hypertension    • Obesity (BMI 30-39.9) 2021   • Sleep apnea     no machine   • Tachycardia     ATRIAL   • Type 2 diabetes mellitus (HCC) 2017       Past Surgical History:   Procedure Laterality Date   • BACK SURGERY      neck X 2   • BREAST RECONSTRUCTION Bilateral    • CARDIAC ABLATION       atrial tachycardia x 5 ablations    • CARDIAC CATHETERIZATION     • CARDIAC SURGERY      stent placed in aorta   • CARDIAC SURGERY      6 surgeries as baby    • CERVICAL FUSION ANTERIOR WITH ARTIFICIAL DISCECTOMY IMPLANTATION N/A 2020    Procedure: C4 VERTEBRECTOMY AND ANTERIOR CERIVCAL DISCECTOMY WITH FUSION OF CERVICAL THREE THROUGH FIVE WITH REMOVAL OF HARDWARE C5-C6;  Surgeon: Mark Kaba MD;  Location: [facility] MAIN OR;  Service: Neurosurgery;  Laterality: N/A;   •  SECTION      x2   • COLONOSCOPY N/A 10/23/2020    Procedure: COLONOSCOPY WITH POLYPECTOMY X6;  Surgeon: Stanley Bourne MD;  Location: [facility] ENDOSCOPY;  Service: Gastroenterology;  Laterality: N/A;  POLYPS, INTERNAL HEMORRHOIDS   • ENDOMETRIAL ABLATION      REMOVAL SCAR TISSUE UTERINE   • ENDOSCOPY N/A 10/23/2020    Procedure: ESOPHAGOGASTRODUODENOSCOPY WITH BIOPSY X 1 AREA;  Surgeon: Stanley Bourne MD;  Location: [facility] ENDOSCOPY;  Service: Gastroenterology;  Laterality: N/A;  GASTRITIS, ESOPHAGITIS, HIATAL HERNIA   • KNEE SURGERY     • MASTECTOMY Bilateral    • NECK SURGERY     • PACEMAKER IMPLANTATION     • PAIN PUMP INSERTION/REVISION N/A 2022    Procedure: PAIN PUMP INSERTION AND INTRATHECAL CATHETER PLACEMENT;  Surgeon: Chuck Hunter MD;  Location: [facility] MAIN OR;  Service: Pain Management;  Laterality: N/A;       Family History   Problem Relation Age of Onset   • Heart disease Mother    • Stroke Mother    • Lung cancer Mother    •  Aneurysm Father    • Diabetes Sister    • No Known Problems Brother    • No Known Problems Brother    • Diabetes type I Half-Sister    • Thyroid cancer Half-Sister    • Cancer Maternal Aunt    • Heart attack Sister    • Thyroid disease Sister    • No Known Problems Sister        Social History     Tobacco Use   • Smoking status: Never Smoker   • Smokeless tobacco: Never Used   Vaping Use   • Vaping Use: Never used   Substance Use Topics   • Alcohol use: Yes     Alcohol/week: 1.0 standard drink     Types: 1 Glasses of wine per week     Comment: rare   • Drug use: Not Currently        Medications Prior to Admission   Medication Sig Dispense Refill Last Dose   • anastrozole (ARIMIDEX) 1 MG tablet Take 1 tablet by mouth Daily. 30 tablet 6 5/19/2022 at Unknown time   • aspirin 81 MG chewable tablet Chew 1 tablet Daily. 30 tablet 1 5/19/2022 at Unknown time   • Blood Glucose Monitoring Suppl (Accu-Chek Araceli) device Use as instructed   To test blood sugar bid 1 each 0 5/18/2022 at Unknown time   • Calcium Carbonate-Vit D-Min (Calcium 600+D Plus Minerals) 600-400 MG-UNIT chewable tablet Chew 600 mg 2 (Two) Times a Day. (Patient taking differently: Chew 600 mg 2 (Two) Times a Day. GUMMIES) 60 each 6 5/19/2022 at Unknown time   • insulin NPH-insulin regular (humuLIN 70/30,novoLIN 70/30) (70-30) 100 UNIT/ML injection Inject 40 Units under the skin into the appropriate area as directed Every Morning.   5/18/2022 at Unknown time   • insulin NPH-insulin regular (humuLIN 70/30,novoLIN 70/30) (70-30) 100 UNIT/ML injection Inject 30 Units under the skin into the appropriate area as directed Every Evening.   5/18/2022 at Unknown time   • lisinopril (PRINIVIL,ZESTRIL) 20 MG tablet Take 20 mg by mouth Daily.   5/19/2022 at Unknown time   • oxyCODONE-acetaminophen (PERCOCET) 5-325 MG per tablet Take 1 tablet by mouth Every 8 (Eight) Hours As Needed for Severe Pain . 21 tablet 0 5/18/2022 at Unknown time   • Palbociclib (Ibrance) 125 MG  "capsule capsule Take 1 capsule by mouth Daily. Take on days 1-21 with food and then off for 7 days. 21 capsule 6 5/18/2022 at Unknown time   • rosuvastatin (Crestor) 20 MG tablet Take 1 tablet by mouth Every Night. 90 tablet 0 5/18/2022 at Unknown time   • acetaminophen (TYLENOL) 650 MG 8 hr tablet Take 650 mg by mouth As Needed for Mild Pain . TAKES BETWEEN PAIN MEDS      • glucose blood (Accu-Chek Araceli Plus) test strip Use as instructed   To test bid 200 each 3    • ibuprofen (ADVIL,MOTRIN) 400 MG tablet Take 400 mg by mouth As Needed for Mild Pain . IN BETWEEN PAIN  MEDS      • Lancets (accu-chek soft touch) lancets Test bid 200 each 3          Promethazine and Tape    Scheduled Meds:arformoterol, 15 mcg, Nebulization, BID - RT  budesonide, 1 mg, Nebulization, BID - RT  rosuvastatin, 20 mg, Oral, Nightly      Continuous Infusions:   PRN Meds:.•  acetaminophen  •  melatonin  •  Morphine **AND** naloxone  •  nitroglycerin  •  ondansetron  •  [COMPLETED] Insert peripheral IV **AND** sodium chloride    Objective     VITAL SIGNS  Vitals:    05/19/22 1831 05/19/22 2216 05/20/22 0611 05/20/22 0731   BP: 150/65 152/86 135/78    BP Location:  Right arm Right arm    Patient Position:  Lying Lying    Pulse: 71 68 70    Resp:  20 20    Temp:  98.3 °F (36.8 °C) 98.3 °F (36.8 °C)    TempSrc:  Oral Oral    SpO2: 92% 95% 94%    Weight:  74.2 kg (163 lb 9.6 oz)  73.9 kg (163 lb)   Height:  154.9 cm (61\")  154.9 cm (61\")       Flowsheet Rows    Flowsheet Row First Filed Value   Admission Height 154.9 cm (61\") Documented at 05/19/2022 1654   Admission Weight 72.1 kg (159 lb) Documented at 05/19/2022 1654           TELEMETRY: Sinus rhythm    Physical Exam:  Constitutional:       Appearance: Well-developed.   Eyes:      General: No scleral icterus.     Conjunctiva/sclera: Conjunctivae normal.      Pupils: Pupils are equal, round, and reactive to light.   HENT:      Head: Normocephalic and atraumatic.   Neck:      Vascular: No " carotid bruit or JVD.   Pulmonary:      Effort: Pulmonary effort is normal.      Breath sounds: Normal breath sounds. No wheezing. No rales.   Cardiovascular:      Normal rate. Regular rhythm.   Pulses:     Intact distal pulses.   Abdominal:      General: Bowel sounds are normal.      Palpations: Abdomen is soft.   Musculoskeletal: Normal range of motion.      Cervical back: Normal range of motion and neck supple. Skin:     General: Skin is warm and dry.      Findings: No rash.   Neurological:      Mental Status: Alert.      Comments: No focal deficits          Results Review:   I reviewed the patient's new clinical results.  Lab Results (last 24 hours)     Procedure Component Value Units Date/Time    POC Glucose Once [[id number]]  (Abnormal) Collected: 05/20/22 1132    Specimen: Blood Updated: 05/20/22 1133     Glucose 134 mg/dL      Comment: Serial Number: [id number]:  [id number]       POC Glucose Once [[id number]]  (Normal) Collected: 05/20/22 0754    Specimen: Blood Updated: 05/20/22 0755     Glucose 104 mg/dL      Comment: Serial Number: [id number]:  [id number]       Troponin [[id number]]  (Normal) Collected: 05/20/22 0059    Specimen: Blood Updated: 05/20/22 0149     Troponin T <0.010 ng/mL     Narrative:      Troponin T Reference Range:  <= 0.03 ng/mL-   Negative for AMI  >0.03 ng/mL-     Abnormal for myocardial necrosis.  Clinicians would have to utilize clinical acumen, EKG, Troponin and serial changes to determine if it is an Acute Myocardial Infarction or myocardial injury due to an underlying chronic condition.       Results may be falsely decreased if patient taking Biotin.      POC Glucose Once [[id number]]  (Abnormal) Collected: 05/19/22 2254    Specimen: Blood Updated: 05/19/22 2255     Glucose 117 mg/dL      Comment: Serial Number: [id number]:  [id number]       Urinalysis, Microscopic Only - Urine, Clean Catch [[id number]]  (Abnormal) Collected: 05/19/22 2030    Specimen: Urine, Clean  Catch Updated: 05/19/22 2041     RBC, UA 0-2 /HPF      WBC, UA 0-2 /HPF      Bacteria, UA None Seen /HPF      Squamous Epithelial Cells, UA 0-2 /HPF      Hyaline Casts, UA None Seen /LPF      Methodology Automated Microscopy    Urinalysis With Microscopic If Indicated (No Culture) - Urine, Clean Catch [[id number]]  (Abnormal) Collected: 05/19/22 2030    Specimen: Urine, Clean Catch Updated: 05/19/22 2039     Color, UA Yellow     Appearance, UA Clear     pH, UA 7.0     Specific Gravity, UA 1.016     Glucose, UA Negative     Ketones, UA Negative     Bilirubin, UA Negative     Blood, UA Negative     Protein, UA Negative     Leuk Esterase, UA Trace     Nitrite, UA Negative     Urobilinogen, UA 0.2 E.U./dL    D-dimer, Quantitative [[id number]]  (Normal) Collected: 05/19/22 1759    Specimen: Blood Updated: 05/19/22 2026     D-Dimer, Quantitative 0.46 mg/L (FEU)     Narrative:      Reference Range  --------------------------------------------------------------------     < 0.50   Negative Predictive Value  0.50-0.59   Indeterminate    >= 0.60   Probable VTE             A very low percentage of patients with DVT may yield D-Dimer results   below the cut-off of 0.50 mg/L FEU.  This is known to be more   prevalent in patients with distal DVT.             Results of this test should always be interpreted in conjunction with   the patient's medical history, clinical presentation and other   findings.  Clinical diagnosis should not be based on the result of   [place]OV[place] D-Dimer alone.    Respiratory Panel PCR w/COVID-19(SARS-CoV-2) ELISHA/MARQUEZ/HAMMAD/PAD/COR/MAD/SHARDA In-House, NP Swab in [facility], 3-4 HR TAT - Swab, Nasopharynx [[id number]]  (Normal) Collected: 05/19/22 1821    Specimen: Swab from Nasopharynx Updated: 05/19/22 1914     ADENOVIRUS, PCR Not Detected     Coronavirus 229E Not Detected     Coronavirus HKU1 Not Detected     Coronavirus NL63 Not Detected     Coronavirus OC43 Not Detected     COVID19 Not Detected     Human  Metapneumovirus Not Detected     Human Rhinovirus/Enterovirus Not Detected     Influenza A PCR Not Detected     Influenza B PCR Not Detected     Parainfluenza Virus 1 Not Detected     Parainfluenza Virus 2 Not Detected     Parainfluenza Virus 3 Not Detected     Parainfluenza Virus 4 Not Detected     RSV, PCR Not Detected     Bordetella pertussis pcr Not Detected     Bordetella parapertussis PCR Not Detected     Chlamydophila pneumoniae PCR Not Detected     Mycoplasma pneumo by PCR Not Detected    Narrative:      In the setting of a positive respiratory panel with a viral infection PLUS a negative procalcitonin without other underlying concern for bacterial infection, consider observing off antibiotics or discontinuation of antibiotics and continue supportive care. If the respiratory panel is positive for atypical bacterial infection (Bordetella pertussis, Chlamydophila pneumoniae, or Mycoplasma pneumoniae), consider antibiotic de-escalation to target atypical bacterial infection.    [place] Draw [[id number]] Collected: 05/19/22 1759    Specimen: Blood Updated: 05/19/22 1905    Narrative:      The following orders were created for panel order [place] Draw.  Procedure                               Abnormality         Status                     ---------                               -----------         ------                     Green Top (Gel)[[id number]]                                                             Lavender Top[[id number]]                                                                Gold Top - SST[[id number]]                                   Final result               Light Blue Top[[id number]]                                   Final result                 Please view results for these tests on the individual orders.    Gold Top - SST [[id number]] Collected: 05/19/22 1759    Specimen: Blood Updated: 05/19/22 1905     Extra Tube Hold for add-ons.     Comment: Auto resulted.       Light Blue Top  [[id number]] Collected: 05/19/22 1759    Specimen: Blood Updated: 05/19/22 1905     Extra Tube Hold for add-ons.     Comment: Auto resulted       Comprehensive Metabolic Panel [[id number]]  (Abnormal) Collected: 05/19/22 1759    Specimen: Blood Updated: 05/19/22 1832     Glucose 61 mg/dL      BUN 16 mg/dL      Creatinine 0.77 mg/dL      Sodium 141 mmol/L      Potassium 4.0 mmol/L      Comment: Slight hemolysis detected by analyzer. Results may be affected.        Chloride 105 mmol/L      CO2 23.0 mmol/L      Calcium 10.0 mg/dL      Total Protein 7.7 g/dL      Albumin 4.40 g/dL      ALT (SGPT) 18 U/L      AST (SGOT) 24 U/L      Alkaline Phosphatase 99 U/L      Total Bilirubin 0.3 mg/dL      Globulin 3.3 gm/dL      A/G Ratio 1.3 g/dL      BUN/Creatinine Ratio 20.8     Anion Gap 13.0 mmol/L      eGFR 89.0 mL/min/1.73      Comment: National Kidney Foundation and American Society of Nephrology (ASN) Task Force recommended calculation based on the Chronic Kidney Disease Epidemiology Collaboration (CKD-EPI) equation refit without adjustment for race.       Narrative:      GFR Normal >60  Chronic Kidney Disease <60  Kidney Failure <15      Troponin [[id number]]  (Normal) Collected: 05/19/22 1759    Specimen: Blood Updated: 05/19/22 1831     Troponin T <0.010 ng/mL     Narrative:      Troponin T Reference Range:  <= 0.03 ng/mL-   Negative for AMI  >0.03 ng/mL-     Abnormal for myocardial necrosis.  Clinicians would have to utilize clinical acumen, EKG, Troponin and serial changes to determine if it is an Acute Myocardial Infarction or myocardial injury due to an underlying chronic condition.       Results may be falsely decreased if patient taking Biotin.      BNP [[id number]]  (Normal) Collected: 05/19/22 1759    Specimen: Blood Updated: 05/19/22 1827     proBNP 488.4 pg/mL     Narrative:      Among patients with dyspnea, NT-proBNP is highly sensitive for the detection of acute congestive heart failure. In addition NT-proBNP of  <300 pg/ml effectively rules out acute congestive heart failure with 99% negative predictive value.    Results may be falsely decreased if patient taking Biotin.      Lyme Disease Total Antibody With Reflex to Immunoassay [[id number]] Collected: 05/19/22 1821    Specimen: Blood from Arm, Left Updated: 05/19/22 1824    Febrile Antibody Profile [[id number]] Collected: 05/19/22 1821    Specimen: Blood from Arm, Left Updated: 05/19/22 1824    Tick Panel - Blood, Arm, Left [[id number]] Collected: 05/19/22 1821    Specimen: Blood from Arm, Left Updated: 05/19/22 1824    Narrative:      The following orders were created for panel order Tick Panel - Blood, Arm, Left.  Procedure                               Abnormality         Status                     ---------                               -----------         ------                     Febrile Antibody Profile[[id number]]                         In process                 E. Chaffeenis-HME (Monoc...[[id number]]                      In process                 Human Granulocytic Ehrli...[[id number]]                      In process                 Lyme Disease Total Antib...[[id number]]                      In process                   Please view results for these tests on the individual orders.    E. Chaffeenis-HME (Monocytic) [[id number]] Collected: 05/19/22 1821    Specimen: Blood from Arm, Left Updated: 05/19/22 1824    Human Granulocytic Polly-HGE [[id number]] Collected: 05/19/22 1821    Specimen: Blood from Arm, Left Updated: 05/19/22 1824    Protime-INR [[id number]]  (Normal) Collected: 05/19/22 1759    Specimen: Blood Updated: 05/19/22 1822     Protime 11.0 Seconds      INR 1.07    aPTT [[id number]]  (Abnormal) Collected: 05/19/22 1759    Specimen: Blood Updated: 05/19/22 1822     PTT 30.0 seconds     Mononucleosis Screen [[id number]]  (Normal) Collected: 05/19/22 1759    Specimen: Blood Updated: 05/19/22 1821     Monospot Negative    CBC & Differential [[id number]]   (Abnormal) Collected: 05/19/22 1759    Specimen: Blood Updated: 05/19/22 1814    Narrative:      The following orders were created for panel order CBC & Differential.  Procedure                               Abnormality         Status                     ---------                               -----------         ------                     CBC Auto Differential[[id number]]        Abnormal            Final result                 Please view results for these tests on the individual orders.    CBC Auto Differential [[id number]]  (Abnormal) Collected: 05/19/22 1759    Specimen: Blood Updated: 05/19/22 1814     WBC 5.70 10*3/mm3      RBC 4.60 10*6/mm3      Hemoglobin 13.2 g/dL      Hematocrit 41.7 %      MCV 90.5 fL      MCH 28.8 pg      MCHC 31.8 g/dL      RDW 14.1 %      RDW-SD 44.6 fl      MPV 8.2 fL      Platelets 113 10*3/mm3      Neutrophil % 67.4 %      Lymphocyte % 22.6 %      Monocyte % 6.8 %      Eosinophil % 2.6 %      Basophil % 0.6 %      Neutrophils, Absolute 3.80 10*3/mm3      Lymphocytes, Absolute 1.30 10*3/mm3      Monocytes, Absolute 0.40 10*3/mm3      Eosinophils, Absolute 0.10 10*3/mm3      Basophils, Absolute 0.00 10*3/mm3      nRBC 0.1 /100 WBC           Imaging Results (Last 24 Hours)     Procedure Component Value Units Date/Time    XR Chest 1 View [[id number]] Collected: 05/19/22 2009     Updated: 05/19/22 2012    Narrative:      DATE OF EXAM:  5/19/2022 8:04 PM     PROCEDURE:  XR CHEST 1 VW-     INDICATIONS:  Cough, shortness of breath, chest pain.      COMPARISON:  09/01/2021.     TECHNIQUE:   Single radiographic view of the chest was obtained.     FINDINGS:  The heart is enlarged with postsurgical changes apparent. This includes  a left-sided transvenous pacemaker. There may be slight pulmonary  vascular congestion. The lungs and pleural spaces are otherwise clear.   There is fusion hardware in the visualized lower cervical spine.       Impression:      Cardiomegaly with questionable slight  pulmonary vascular congestion.     Electronically Signed By-Sterling Joaquin MD On:5/19/2022 8:10 PM  This report was finalized on [id number] by  Sterling Joaquin MD.          EKG      I personally viewed and interpreted the patient's EKG/Telemetry data:    ECHOCARDIOGRAM:      STRESS MYOVIEW:    CARDIAC CATHETERIZATION:    OTHER:         Assessment & Plan     Active Problems:    Dyspnea  Obstructive sleep apnea  Coronary artery disease  Hypertension  Hyperlipidemia  Hepatitis C    Patient presented with shortness of breath and is being ruled out for MI by EKG enzymes  Patient will also have an echocardiogram for LV function valvular abnormalities  Patient will have a CT scan to rule out pulmonary embolism  Patient has sleep apnea and pulmonology is called to make sure she uses a CPAP machine  Blood pressure and heart rate are stable now  Patient's lipid levels are followed by the primary care doctor.    I discussed the patients findings and my recommendations with patient and his    Irvin Alfaro MD  05/20/22  13:28 EDT

## 2022-05-20 NOTE — CASE MANAGEMENT/SOCIAL WORK
Discharge Planning Assessment   [place]     Patient Name: Gladys Garrison  MRN: [id number]  Today's Date: 5/20/2022    Admit Date: 5/19/2022     Discharge Needs Assessment     Row Name 05/20/22 1112       Living Environment    People in Home spouse    Unique Family Situation also repors her brother in law and nephew live with her also.    Current Living Arrangements home    Primary Care Provided by self    Provides Primary Care For no one       Resource/Environmental Concerns    Resource/Environmental Concerns none    Transportation Concerns none       Transition Planning    Patient/Family Anticipates Transition to home    Patient/Family Anticipated Services at Transition none    Transportation Anticipated family or friend will provide       Discharge Needs Assessment    Readmission Within the Last 30 Days no previous admission in last 30 days    Equipment Currently Used at Home none    Concerns to be Addressed patient refuses services;no discharge needs identified    Concerns Comments pt upset about one of hr medications costing $1000/month.  she expressed frustation with trying to change insurance companies. she still works, can't receive her ss; refused to be screened for any financial assistance through [employer].    Anticipated Changes Related to Illness none    Equipment Needed After Discharge none               Discharge Plan     Row Name 05/20/22 1110       Plan    Plan Dc plan: home with spouse    Plan Comments met with pt at bedside; reports I with ADLs, still works some, can drive but doesn't like driving her larger vehicles; spouse to provide transportation home at d/c; reported lots of frustration of living with pain, but wanting to stay functional; still working, but having large copays; refused to be screened for assistance through [employer].              Continued Care and Services - Admitted Since 5/19/2022    Coordination has not been started for this encounter.     Selected Continued Care -  Episodes Includes selections from active Coordinated Care Management episodes    Oncology Episode start date: 11/11/2021   There are no active outsourced providers for this episode.                  Demographic Summary     Row Name 05/20/22 1111       General Information    Admission Type observation    Arrived From emergency department    Referral Source admission list    Reason for Consult discharge planning    Preferred Language English               Functional Status     Row Name 05/20/22 1111       Functional Status    Usual Activity Tolerance good    Current Activity Tolerance good       Functional Status, IADL    Medications independent    Meal Preparation independent    Housekeeping independent    Laundry independent    Shopping independent       Employment/    Employment/ Comments reports employment              Met with patient in room wearing PPE: mask.      Maintained distance greater than six feet and spent less than 15 minutes in the room.            Alisha Newton RN

## 2022-05-21 ENCOUNTER — READMISSION MANAGEMENT (OUTPATIENT)
Dept: CALL CENTER | Facility: HOSPITAL | Age: 62
End: 2022-05-21

## 2022-05-21 VITALS
OXYGEN SATURATION: 97 % | HEIGHT: 61 IN | TEMPERATURE: 98.1 F | DIASTOLIC BLOOD PRESSURE: 86 MMHG | WEIGHT: 157.85 LBS | BODY MASS INDEX: 29.8 KG/M2 | RESPIRATION RATE: 20 BRPM | SYSTOLIC BLOOD PRESSURE: 132 MMHG | HEART RATE: 85 BPM

## 2022-05-21 LAB
B BURGDOR IGG+IGM SER QL IA: NEGATIVE
GLUCOSE BLDC GLUCOMTR-MCNC: 112 MG/DL (ref 70–105)
GLUCOSE BLDC GLUCOMTR-MCNC: 97 MG/DL (ref 70–105)

## 2022-05-21 PROCEDURE — 82962 GLUCOSE BLOOD TEST: CPT

## 2022-05-21 PROCEDURE — 99213 OFFICE O/P EST LOW 20 MIN: CPT | Performed by: INTERNAL MEDICINE

## 2022-05-21 PROCEDURE — G0378 HOSPITAL OBSERVATION PER HR: HCPCS

## 2022-05-21 RX ORDER — ALBUTEROL SULFATE 90 UG/1
2 AEROSOL, METERED RESPIRATORY (INHALATION) EVERY 4 HOURS PRN
Qty: 8.5 G | Refills: 0 | Status: SHIPPED | OUTPATIENT
Start: 2022-05-21 | End: 2022-05-21 | Stop reason: SDUPTHER

## 2022-05-21 RX ORDER — BUDESONIDE AND FORMOTEROL FUMARATE DIHYDRATE 80; 4.5 UG/1; UG/1
2 AEROSOL RESPIRATORY (INHALATION) 2 TIMES DAILY
Qty: 10.2 G | Refills: 0 | Status: SHIPPED | OUTPATIENT
Start: 2022-05-21 | End: 2022-09-02

## 2022-05-21 RX ORDER — DILTIAZEM HYDROCHLORIDE 60 MG/1
2 TABLET, FILM COATED ORAL 2 TIMES DAILY
Qty: 10.2 G | Refills: 0 | Status: SHIPPED | OUTPATIENT
Start: 2022-05-21 | End: 2022-05-21 | Stop reason: SDUPTHER

## 2022-05-21 RX ORDER — ALBUTEROL SULFATE 90 UG/1
2 AEROSOL, METERED RESPIRATORY (INHALATION) EVERY 4 HOURS PRN
Qty: 8.5 G | Refills: 0 | Status: SHIPPED | OUTPATIENT
Start: 2022-05-21 | End: 2022-06-20

## 2022-05-21 NOTE — PROGRESS NOTES
"Referring Provider: Hospitalist    Reason for follow-up: Shortness of breath     Patient Care Team:  Angelica Rodriguez MD as PCP - General (Internal Medicine)  Demetrio Valentino MD as Consulting Physician (Cardiology)    Subjective .  Patient still continues to have shortness of breath    Objective  Lying in bed comfortably     Review of Systems   Constitutional: Negative for fever and malaise/fatigue.   Cardiovascular: Negative for chest pain, dyspnea on exertion and palpitations.   Respiratory: Positive for shortness of breath. Negative for cough.    Skin: Negative for rash.   Gastrointestinal: Negative for abdominal pain, nausea and vomiting.   Neurological: Negative for focal weakness and headaches.   All other systems reviewed and are negative.      Promethazine and Tape    Scheduled Meds:arformoterol, 15 mcg, Nebulization, BID - RT  budesonide, 1 mg, Nebulization, BID - RT  insulin lispro, 0-14 Units, Subcutaneous, TID AC  insulin NPH-insulin regular, 30 Units, Subcutaneous, Q PM  rosuvastatin, 20 mg, Oral, Nightly      Continuous Infusions:   PRN Meds:.•  acetaminophen  •  bisacodyl  •  dextrose  •  dextrose  •  glucagon (human recombinant)  •  insulin lispro **AND** insulin lispro  •  melatonin  •  Morphine **AND** naloxone  •  nitroglycerin  •  ondansetron  •  [COMPLETED] Insert peripheral IV **AND** sodium chloride        VITAL SIGNS  Vitals:    05/20/22 2003 05/20/22 2006 05/20/22 2210 05/21/22 0535   BP:   145/76 120/58   BP Location:   Right arm Right arm   Patient Position:   Lying Lying   Pulse: 74 71 74 78   Resp: 20 22 16 18   Temp:   98.2 °F (36.8 °C) 98.4 °F (36.9 °C)   TempSrc:   Oral Oral   SpO2: 99% 99% 99% 98%   Weight:    71.6 kg (157 lb 13.6 oz)   Height:           Flowsheet Rows    Flowsheet Row First Filed Value   Admission Height 154.9 cm (61\") Documented at 05/19/2022 1654   Admission Weight 72.1 kg (159 lb) Documented at 05/19/2022 1654           TELEMETRY: Sinus " rhythm    Physical Exam:  Constitutional:       Appearance: Well-developed.   Eyes:      General: No scleral icterus.     Conjunctiva/sclera: Conjunctivae normal.   HENT:      Head: Normocephalic and atraumatic.   Neck:      Vascular: No carotid bruit or JVD.   Pulmonary:      Effort: Pulmonary effort is normal.      Breath sounds: Normal breath sounds. No wheezing. No rales.   Cardiovascular:      Normal rate. Regular rhythm.   Pulses:     Intact distal pulses.   Abdominal:      General: Bowel sounds are normal.      Palpations: Abdomen is soft.   Musculoskeletal:      Cervical back: Normal range of motion and neck supple. Skin:     General: Skin is warm and dry.      Findings: No rash.   Neurological:      Mental Status: Alert.          Results Review:   I reviewed the patient's new clinical results.  Lab Results (last 24 hours)     Procedure Component Value Units Date/Time    POC Glucose Once [[id number]]  (Abnormal) Collected: 05/20/22 2105    Specimen: Blood Updated: 05/20/22 2106     Glucose 106 mg/dL      Comment: Serial Number: [id number]:  [id number]       POC Glucose Once [[id number]]  (Abnormal) Collected: 05/20/22 1648    Specimen: Blood Updated: 05/20/22 1649     Glucose 221 mg/dL      Comment: Serial Number: [id number]:  [id number]       POC Glucose Once [[id number]]  (Abnormal) Collected: 05/20/22 1132    Specimen: Blood Updated: 05/20/22 1133     Glucose 134 mg/dL      Comment: Serial Number: [id number]:  [id number]       POC Glucose Once [[id number]]  (Normal) Collected: 05/20/22 0754    Specimen: Blood Updated: 05/20/22 0755     Glucose 104 mg/dL      Comment: Serial Number: [id number]:  [id number]             Imaging Results (Last 24 Hours)     Procedure Component Value Units Date/Time    CT Angiogram Chest With & Without Contrast [[id number]] Collected: 05/20/22 1529     Updated: 05/20/22 1552    Narrative:         DATE OF EXAM:  5/20/2022 3:18 PM     PROCEDURE:  CT ANGIOGRAM  CHEST W WO CONTRAST-     INDICATIONS:   Bacterial pneumonia, not elsewhere classified; R06.00-Dyspnea,  unspecified     COMPARISON:   Chest CT with contrast 11/03/2021     TECHNIQUE:  Contiguous axial imaging was obtained from the thoracic inlet through  the upper abdomen following the intravenous administration of Isovue  370. Reconstructed coronal and sagittal images were also obtained. In  addition, a 3 D volume rendered image was obtained after post  processing. Automated exposure control and iterative reconstruction  methods were used.     FINDINGS:  There is a left-sided transvenous pacemaker in place and there are  postoperative changes of bilateral augmentation mammoplasty. There are  postoperative changes of prior sternotomy. There is chronic-appearing  pulmonary parenchymal scarring the left lung which does not appear  significantly changed from the previous CT. There are bibasilar fibrotic  changes present. No acute infiltrates are identified in the lungs. There  is no pleural or pericardial fluid present. There are postoperative  changes of prior pulmonic valve replacement. The right ventricle appears  dilated and there appear to be postsurgical changes in the right  ventricular wall. There is enlargement of the central pulmonary arteries  suggesting some degree of pulmonary hypertension. There are no pulmonary  artery filling defects identified to suggest pulmonary embolism. There  is an aortic stent graft identified in the distal transverse and  proximal descending thoracic aorta. The aorta is of normal caliber.  Heart size is enlarged. There is no pleural or pericardial fluid.       Impression:         1. Chronic fibrotic changes in both lungs. No acute infiltrates when  compared with the last study.  2. Postoperative changes of prior pulmonic valve replacement, right  ventricular surgery, transvenous pacemaker placement, and aortic stent  graft placement. Postop changes of bilateral augmentation  mammoplasty.  3. Enlargement of the right ventricle enlargement of the central  pulmonary arteries suggesting significant pulmonary hypertension.  4. No evidence of pulmonary embolism.     Electronically Signed By-Christ Dowling MD On:5/20/2022 3:36 PM  This report was finalized on [id number] by  Christ Dowling MD.          EKG      I personally viewed and interpreted the patient's EKG/Telemetry data:    ECHOCARDIOGRAM:    STRESS MYOVIEW:    CARDIAC CATHETERIZATION:    OTHER:         Assessment & Plan     Active Problems:    Dyspnea  Coronary disease  Hypertension  Hyperlipidemia  Aortic stent graft  Pulmonary valve surgery  Observed sleep apnea  Hepatitis C    Patient presented with shortness of breath and is ruled out for MI by EKG enzymes  Patient had a CT scan which did not show any pulmonary embolism  Patient had an echocardiogram which showed normal LV systolic function but has moderate right ventricular dilatation with moderate tricuspid regurgitation  Pulmonary valve is not well visualized  Blood pressure heart rate stable  Patient is sleep apnea and is followed by the pulmonologist.  No further cardiac work-up.    I discussed the patients findings and my recommendations with patient and nurse    Irvin Alfaro MD  05/21/22  06:58 EDT

## 2022-05-21 NOTE — PLAN OF CARE
Problem: Adult Inpatient Plan of Care  Goal: Plan of Care Review  Outcome: Met  Goal: Patient-Specific Goal (Individualized)  Outcome: Met  Goal: Absence of Hospital-Acquired Illness or Injury  Outcome: Met  Goal: Optimal Comfort and Wellbeing  Outcome: Met  Goal: Readiness for Transition of Care  Outcome: Met     Problem: Chest Pain  Goal: Resolution of Chest Pain Symptoms  Outcome: Met     Problem: Activity and Energy Impairment (Anxiety Signs/Symptoms)  Goal: Optimized Energy Level (Anxiety Signs/Symptoms)  Outcome: Met     Problem: Cognitive Impairment (Anxiety Signs/Symptoms)  Goal: Optimized Cognitive Function (Anxiety Signs/Symptoms)  Outcome: Met     Problem: Mood Impairment (Anxiety Signs/Symptoms)  Goal: Improved Mood Symptoms (Anxiety Signs/Symptoms)  Outcome: Met     Problem: Sleep Impairment (Anxiety Signs/Symptoms)  Goal: Improved Sleep (Anxiety Signs/Symptoms)  Outcome: Met     Problem: Social, Occupational or Functional Impairment (Anxiety Signs/Symptoms)  Goal: Enhanced Social, Occupational or Functional Skills (Anxiety Signs/Symptoms)  Outcome: Met     Problem: Somatic Disturbance (Anxiety Signs/Symptoms)  Goal: Improved Somatic Symptoms (Anxiety Signs/Symptoms)  Outcome: Met   Goal Outcome Evaluation:

## 2022-05-21 NOTE — PROGRESS NOTES
"PULMONARY CRITICAL CARE PROGRESS  NOTE      PATIENT IDENTIFICATION:  Name: Gladys Garrison  MRN: [id number]  :  1960     Age: 61 y.o.  Sex: female    DATE OF Note:  2022   Referring Physician: David Garcia DO                  Subjective:   Feeling better, no new issue, no SOB, no chest pain, no nausea or vomiting, no change in bowel habit, no dysuria,  no new  skin rash or itching.      Objective:  tMax 24 hrs: Temp (24hrs), Av.3 °F (36.8 °C), Min:98.1 °F (36.7 °C), Max:98.5 °F (36.9 °C)      Vitals Ranges:   Temp:  [98.1 °F (36.7 °C)-98.5 °F (36.9 °C)] 98.1 °F (36.7 °C)  Heart Rate:  [71-86] 85  Resp:  [16-22] 20  BP: (120-154)/(58-86) 132/86    Intake and Output Last 3 Shifts:   I/O last 3 completed shifts:  In: 450 [P.O.:450]  Out: -     Exam:  /86 (BP Location: Right arm, Patient Position: Lying)   Pulse 85   Temp 98.1 °F (36.7 °C) (Oral)   Resp 20   Ht 154.9 cm (61\")   Wt 71.6 kg (157 lb 13.6 oz)   LMP  (LMP Unknown)   SpO2 97%   BMI 29.83 kg/m²     General Appearance:     HEENT:  Normocephalic, without obvious abnormality. Conjunctivae/corneas clear.  Normal external ear canals. Nares normal, no drainage     Neck:  Supple, symmetrical, trachea midline. No JVD.  Lungs /Chest wall:   Bilateral basal rhonchi, respirations unlabored, symmetrical wall movement.     Heart:  Regular rate and rhythm, systolic murmur PMI left sternal border  Abdomen: Soft, nontender, no masses, no organomegaly.    Extremities: Trace edema, no clubbing or cyanosis        Medications:    Current Facility-Administered Medications:   •  acetaminophen (TYLENOL) tablet 650 mg, 650 mg, Oral, Q4H PRN, David Garcia DO  •  arformoterol (BROVANA) nebulizer solution 15 mcg, 15 mcg, Nebulization, BID - RT, Nasim Fernando MD, 15 mcg at 22  •  bisacodyl (DULCOLAX) EC tablet 10 mg, 10 mg, Oral, Daily PRN, Tanner Guaman PA-C, 10 mg at 22  •  budesonide (PULMICORT) " nebulizer solution 1 mg, 1 mg, Nebulization, BID - RT, Nasim Fernando MD, 0.5 mg at 05/20/22 1959  •  dextrose (D50W) (25 g/50 mL) IV injection 25 g, 25 g, Intravenous, Q15 Min PRN, Tanner Guaman PA-C  •  dextrose (GLUTOSE) oral gel 15 g, 15 g, Oral, Q15 Min PRN, Tanner Guaman PA-C  •  glucagon (human recombinant) (GLUCAGEN DIAGNOSTIC) 1 mg in sterile water (preservative free) 1 mL injection, 1 mg, Intramuscular, Q15 Min PRN, Tanner Guaman PA-C  •  insulin lispro (ADMELOG) injection 0-14 Units, 0-14 Units, Subcutaneous, TID AC **AND** insulin lispro (ADMELOG) injection 0-14 Units, 0-14 Units, Subcutaneous, PRN, Tanner Guaman PA-C  •  insulin NPH-insulin regular (humuLIN 70/30,novoLIN 70/30) injection 30 Units, 30 Units, Subcutaneous, Q PM, Tanner Guaman PA-C, 30 Units at 05/20/22 1804  •  melatonin tablet 5 mg, 5 mg, Oral, Nightly PRN, David Garcia DO, 5 mg at 05/20/22 2032  •  morphine injection 1 mg, 1 mg, Intravenous, Q4H PRN **AND** naloxone (NARCAN) injection 0.4 mg, 0.4 mg, Intravenous, Q5 Min PRN, David Garcia DO  •  nitroglycerin (NITROSTAT) SL tablet 0.4 mg, 0.4 mg, Sublingual, Q5 Min PRN, David Garcia DO  •  ondansetron (ZOFRAN) injection 4 mg, 4 mg, Intravenous, Q6H PRN, David Garcia DO  •  rosuvastatin (CRESTOR) tablet 20 mg, 20 mg, Oral, Nightly, Mere Tian, APRN, 20 mg at 05/20/22 2032  •  [COMPLETED] Insert peripheral IV, , , Once **AND** sodium chloride 0.9 % flush 10 mL, 10 mL, Intravenous, PRN, Leatha Barnard, APRN    Data Review:  All labs (24hrs):   Recent Results (from the past 24 hour(s))   POC Glucose Once    Collection Time: 05/20/22  4:48 PM    Specimen: Blood   Result Value Ref Range    Glucose 221 (H) 70 - 105 mg/dL   POC Glucose Once    Collection Time: 05/20/22  9:05 PM    Specimen: Blood   Result Value Ref Range    Glucose 106 (H) 70 - 105 mg/dL   POC Glucose Once    Collection Time: 05/21/22   7:38 AM    Specimen: Blood   Result Value Ref Range    Glucose 97 70 - 105 mg/dL   POC Glucose Once    Collection Time: 05/21/22 11:32 AM    Specimen: Blood   Result Value Ref Range    Glucose 112 (H) 70 - 105 mg/dL        Imaging:  Adult Transthoracic Echo Complete W/ Cont if Necessary Per Protocol  · Left ventricular ejection fraction appears to be 51 - 55%.  · The right ventricular cavity is moderately dilated.  · Moderate tricuspid valve regurgitation is present.  · Mild dilation of the aortic root is present.  · No pericardial effusion noted     CT Angiogram Chest With & Without Contrast  Narrative:    DATE OF EXAM:  5/20/2022 3:18 PM     PROCEDURE:  CT ANGIOGRAM CHEST W WO CONTRAST-     INDICATIONS:   Bacterial pneumonia, not elsewhere classified; R06.00-Dyspnea,  unspecified     COMPARISON:   Chest CT with contrast 11/03/2021     TECHNIQUE:  Contiguous axial imaging was obtained from the thoracic inlet through  the upper abdomen following the intravenous administration of Isovue  370. Reconstructed coronal and sagittal images were also obtained. In  addition, a 3 D volume rendered image was obtained after post  processing. Automated exposure control and iterative reconstruction  methods were used.     FINDINGS:  There is a left-sided transvenous pacemaker in place and there are  postoperative changes of bilateral augmentation mammoplasty. There are  postoperative changes of prior sternotomy. There is chronic-appearing  pulmonary parenchymal scarring the left lung which does not appear  significantly changed from the previous CT. There are bibasilar fibrotic  changes present. No acute infiltrates are identified in the lungs. There  is no pleural or pericardial fluid present. There are postoperative  changes of prior pulmonic valve replacement. The right ventricle appears  dilated and there appear to be postsurgical changes in the right  ventricular wall. There is enlargement of the central pulmonary  arteries  suggesting some degree of pulmonary hypertension. There are no pulmonary  artery filling defects identified to suggest pulmonary embolism. There  is an aortic stent graft identified in the distal transverse and  proximal descending thoracic aorta. The aorta is of normal caliber.  Heart size is enlarged. There is no pleural or pericardial fluid.     Impression:    1. Chronic fibrotic changes in both lungs. No acute infiltrates when  compared with the last study.  2. Postoperative changes of prior pulmonic valve replacement, right  ventricular surgery, transvenous pacemaker placement, and aortic stent  graft placement. Postop changes of bilateral augmentation mammoplasty.  3. Enlargement of the right ventricle enlargement of the central  pulmonary arteries suggesting significant pulmonary hypertension.  4. No evidence of pulmonary embolism.     Electronically Signed By-Christ Dowling MD On:5/20/2022 3:36 PM  This report was finalized on [id number] by  Christ Dowling MD.       ASSESSMENT:   Restrictive lung disease  Dyspnea  Obstructive sleep apnea  Need to rule out post underlying cardiac etiology     PLAN:  CT chest was reviewed showing possible restrictive patterns  Patient might benefit from pulmonary rehab but will follow-up as outpatient  PFT still pending we will follow as outpatient  Bronchodilator  Inhaled corticosteroids  Electrolytes/ glycemic control  DVT and GI prophylaxis.    Total Critical care time in direct medical management (   ) minutes. This time specifically excludes time spent performing procedures.  Nasim Fernando MD. D, ABSM.     5/21/2022  13:23 EDT

## 2022-05-21 NOTE — PLAN OF CARE
Goal Outcome Evaluation:  Plan of Care Reviewed With: patient           Outcome Evaluation: pt appears to have rested tonight  continues to be very anxious and emotional VSS still having SOA at times. continues to get breathing treatments and IS

## 2022-05-21 NOTE — OUTREACH NOTE
Prep Survey    Flowsheet Row Responses   [facility] patient discharged from? [facility]   Is LACE score < 7 ? No   Emergency Room discharge w/ pulse ox? No   Eligibility [facility]   Date of Admission 05/19/22   Date of Discharge 05/21/22   Discharge Disposition Home or Self Care   Discharge diagnosis Fatigue dyspnea and rash  Shortness of breath   Does the patient have one of the following disease processes/diagnoses(primary or secondary)? Other   Does the patient have Home health ordered? No   Is there a DME ordered? No   Prep survey completed? Yes          BHAVANA IRBY - Registered Nurse

## 2022-05-22 LAB — QT INTERVAL: 472 MS

## 2022-05-23 ENCOUNTER — TRANSITIONAL CARE MANAGEMENT TELEPHONE ENCOUNTER (OUTPATIENT)
Dept: CALL CENTER | Facility: HOSPITAL | Age: 62
End: 2022-05-23

## 2022-05-23 NOTE — CASE MANAGEMENT/SOCIAL WORK
Case Management Discharge Note    Transportation Services  Private: Car    Final Discharge Disposition Code: 01 - home or self-care

## 2022-05-23 NOTE — OUTREACH NOTE
"Call Center TCM Note    Flowsheet Row Responses   [facility] patient discharged from? [facility]   Does the patient have one of the following disease processes/diagnoses(primary or secondary)? Other   TCM attempt successful? Yes   Discharge diagnosis Fatigue dyspnea and rash  Shortness of breath   Meds reviewed with patient/caregiver? Yes   Is the patient having any side effects they believe may be caused by any medication additions or changes? No   Does the patient have all medications ordered at discharge? Yes   Is the patient taking all medications as directed (includes completed medication regime)? No   Medication comments Pt did not p/u ordered inhaler and symbicort. She does not want to spend the money when pt states the \"lung doctor\" told her she could get the same results by walking outdoors.    Does the patient have a primary care provider?  Yes   Does the patient have an appointment with their PCP within 7 days of discharge? No   Comments regarding PCP Pt declines TCM APPT. Pt had to go to  when PCP could not fit her in prior to hosp admit, so she will wait on a follow up.    Has the patient kept scheduled appointments due by today? N/A   Has home health visited the patient within 72 hours of discharge? N/A   Psychosocial issues? No   Did the patient receive a copy of their discharge instructions? Yes   Nursing interventions Reviewed instructions with patient   What is the patient's perception of their health status since discharge? Improving   Is the patient/caregiver able to teach back signs and symptoms related to disease process for when to call PCP? Yes   Is the patient/caregiver able to teach back signs and symptoms related to disease process for when to call 911? Yes   Is the patient/caregiver able to teach back the hierarchy of who to call/visit for symptoms/problems? PCP, Specialist, Home health nurse, Urgent Care, ED, 911 Yes   If the patient is a current smoker, are they able to teach back " "resources for cessation? Not a smoker   TCM call completed? Yes   Wrap up additional comments Pt doing ok. States breathing better. Pt did not p/u ordered inhaler and symbicort. She does not want to spend the money when pt states the \"lung doctor\" told her she could get the same results by walking outdoors. Pt declines TCM APPT. Pt had to go to  when PCP could not fit her in prior to hosp admit, so she will wait on a follow up.           Dedra Montana MA    5/23/2022, 16:13 EDT      "

## 2022-05-23 NOTE — OUTREACH NOTE
Call Center TCM Note    Flowsheet Row Responses   [facility] patient discharged from? [facility]   Does the patient have one of the following disease processes/diagnoses(primary or secondary)? Other   TCM attempt successful? No   Unsuccessful attempts Attempt 1          Dedra Montana MA    5/23/2022, 10:48 EDT

## 2022-05-24 LAB
A PHAGOCYTOPH IGG TITR SER IF: NEGATIVE {TITER}
A PHAGOCYTOPH IGM TITR SER IF: NEGATIVE {TITER}
BRUCELLA IGG SER QL IA: NEGATIVE
BRUCELLA IGM SER QL IA: NEGATIVE
E CHAFFEENSIS IGG TITR SER IF: NEGATIVE {TITER}
E CHAFFEENSIS IGM TITR SER IF: NEGATIVE {TITER}
RICK SF IGG TITR SER IF: NORMAL {TITER}
RICK SF IGM TITR SER IF: NORMAL {TITER}
RICK TYPHUS IGG TITR SER IF: NORMAL {TITER}
RICK TYPHUS IGM TITR SER IF: NORMAL {TITER}

## 2022-06-06 ENCOUNTER — TELEPHONE (OUTPATIENT)
Dept: PAIN MEDICINE | Facility: CLINIC | Age: 62
End: 2022-06-06

## 2022-06-06 NOTE — TELEPHONE ENCOUNTER
Pt showed up late to appt today and now wants a med refill and increase she fell and broke her ribs and the cancer pain is getting worse.

## 2022-06-15 ENCOUNTER — SPECIALTY PHARMACY (OUTPATIENT)
Dept: PHARMACY | Facility: HOSPITAL | Age: 62
End: 2022-06-15

## 2022-06-15 ENCOUNTER — TELEPHONE (OUTPATIENT)
Dept: ONCOLOGY | Facility: CLINIC | Age: 62
End: 2022-06-15

## 2022-06-15 NOTE — PROGRESS NOTES
MTM Encounter-Re: Adherence and side effects (Ibrance)    Today's encounter was conducted via telephone.    Medication:  Ibrance+Arimidex  - Reason for outreach: Routine medication check-in .  - Administration: Patient stopped medication greater than one month ago due to breathing issues..  - Missed doses: Patient reports missing all doses in the last 30 days.  - Self-administration: Patient demonstrates ability to self-administer medication. No barriers to adherence identified.   - Diagnosis/Indication: breast cancer. Progress toward achieving therapeutic goals reviewed.   - Patient is experiencing side effects Patient is having difficulty breathing (she was hospitalized in May for this) and believes Ibrance is contributing to shortness of breath.  She also has difficulty remembering, per patient, and believes this is from medicaiton as well.  .    - Medication availability/affordability: Patient has had no issues obtaining medication from pharmacy.   - Questions/concerns about medications: Gladys reports that she stopped Ibrance and is due for a follow up with Dr. Nunez.  She does not want to take medication if it is resulting in decreased quality of life from side effects.  She has been off medication for over one month without improvement in breathing.  Letting MD know of patient's decision and asked scheduling to make follow up appointment with Dr. Nunez to discuss restart of Ibrance or other options.  Patient also said she does take the Arimidex but is due for a refill.  Contacted the dispensing pharmacy and patient has not filled since 11/2021 for a 90 day supply.  They contacted her 3 times in March to set up refill but patient was not responsive.  They will do a one time outreach today to see if they can reach Gladys and schedule medication delivery.       Completed medication reconciliation today to assess for drug interactions.   Reviewed allergies, medical history, labs, quality of life , and  medication history with patient.   Patient has had the following changes to medication list: stopped taking Ibrance as well as inhalers due to potential side effects. ; patient's chart has been updated to reflect changes. Assessed medication list for interactions, no significant drug interactions noted.   Advised pt to call the clinic if any new medications are started so we can assess for drug-drug interactions.     All questions addressed. Patient had no additional concerns for MTM office.     Eda Her  6/15/2022  10:07 EDT

## 2022-06-15 NOTE — TELEPHONE ENCOUNTER
Hub is instructed to read the documentation below to patient  ATTEMPTED TO CONTACT PATIENT REGARDING MD F/U APPT.  NO ANSWER.  LMV ASKING PATIENT TO CALL BACK.

## 2022-06-20 ENCOUNTER — HOSPITAL ENCOUNTER (OUTPATIENT)
Dept: PAIN MEDICINE | Facility: HOSPITAL | Age: 62
Discharge: HOME | End: 2022-06-20
Admitting: HOSPITALIST

## 2022-06-20 VITALS
BODY MASS INDEX: 28.32 KG/M2 | RESPIRATION RATE: 20 BRPM | HEIGHT: 61 IN | OXYGEN SATURATION: 95 % | TEMPERATURE: 97.5 F | WEIGHT: 150 LBS | DIASTOLIC BLOOD PRESSURE: 84 MMHG | HEART RATE: 89 BPM | SYSTOLIC BLOOD PRESSURE: 165 MMHG

## 2022-06-20 DIAGNOSIS — G89.3 CANCER ASSOCIATED PAIN: Primary | ICD-10-CM

## 2022-06-20 PROCEDURE — 62370 ANL SP INF PMP W/MDREPRG&FIL: CPT | Performed by: STUDENT IN AN ORGANIZED HEALTH CARE EDUCATION/TRAINING PROGRAM

## 2022-06-20 NOTE — PROCEDURES
Intrathecal Pump Reprogram with Fluoroscopic Guidance:      PREOPERATIVE DIAGNOSIS:  Presence of IDDS system.  Cancer pain.    POSTOPERATIVE DIAGNOSIS:  Same as preop diagnosis    PROCEDURE:   Intrathecal pump Refill / Reprogram requiring MD expertise, and requirement for fluoroscopic guidance.  CPT 72930    IDDS System:   MedTronic Synchromed II      PRE-PROCEDURE DISCUSSION WITH PATIENT:    Risks and complications were discussed with the patient prior to starting the procedure and informed consent was obtained.  We discussed various topics including but not limited to bleeding, infection, injury, nerve injury, paralysis, coma, overdose, reaction to injectate and/or medication, death, postprocedural painful flare-up, postprocedural site soreness, and a lack of pain relief resulting from the procedure or the knowledge gained from the procedure, and a risk of equipment malfunction or damage.        SURGEON:  Chuck Hunter MD MD    REASON FOR PROCEDURE:    Inadequate pain relief with current settings.    SEDATION:  Patient declined administration of moderate sedation      LOCAL ANESTHETICS:  NONE,     DESCRIPTON OF PROCEDURE:  The pump was interrogated.  The pump is currently running a solution of Morphine at a concentration of 1mg/ml and at a dose of 0.1300/day on the basal infusion.  In addition, a PTM dose was not programmed.    The infusion was increased to 0.1600mg/day     The needle was removed intact.  Vital signs were stable throughout.        ESTIMATED BLOOD LOSS:  none  SPECIMENS:  none    COMPLICATIONS:   No complications were noted.    TOLERANCE & DISCHARGE CONDITION:    The patient tolerated the procedure well.  Pump site is intact with minimal tenderness, and no erythema nor drainage.  The patient was transported to the recovery area without difficulties.  The patient was discharged to home under the care of family in stable and satisfactory condition.      PLAN OF CARE:  1. The patient was given  our standard instruction sheet.  2. The patient will Plan for Refill 1st week of august.  3. The patient will resume all medications as per the medication reconciliation sheet.        4.   Patient will return for pump refill when due or sooner if needed.

## 2022-06-21 ENCOUNTER — TELEPHONE (OUTPATIENT)
Dept: FAMILY MEDICINE CLINIC | Facility: CLINIC | Age: 62
End: 2022-06-21

## 2022-06-21 NOTE — TELEPHONE ENCOUNTER
Caller: Gladys Garrison    Relationship: Self    Best call back number: [phone]    What orders are you requesting (i.e. lab or imaging): X-RAY OR RIGHT SHOULDER, RIBCAGE    In what timeframe would the patient need to come in: ASAP    Where will you receive your lab/imaging services: [facility]    Additional notes: PATIENT SAID SHE THINKS SHE MAY HAVE EITHER A BROKEN RIB OR A DISPLACED SHOULDER, SHE IS IN A LOT OF PAIN AND IS NOT SURE WHAT CAUSED IT, SHE IS CONCERNED HER CANCER MAY HAVE SPREAD AND IS CAUSING THE PAIN BUT WOULD LIKE TO RULE OUT A BREAK     SHE SAID SHE DOESN'T REMEMBER FALLING OR DOING ANYTHING    SHE CALLED AND ASKED IF THE OFFICE COULD DO X-RAYS TO SEE WHAT WAS WRONG, WANTING TO SEE IF AN X-RAY CAN BE ORDERED AT THE HOSPITAL    IF IT IS ORDERED, SHE WANTS TO SEE WHERE SHE NEEDS TO GO WITHIN THE HOSPITAL TO GET IT DONE

## 2022-06-21 NOTE — TELEPHONE ENCOUNTER
Gave message to patient at 11:02am.  Offered an appt with Liseth on 6/24/2022, soonest available, and patient said she would go to the [facility] [place] instead.

## 2022-06-23 ENCOUNTER — TELEPHONE (OUTPATIENT)
Dept: PAIN MEDICINE | Facility: CLINIC | Age: 62
End: 2022-06-23

## 2022-07-13 ENCOUNTER — TELEPHONE (OUTPATIENT)
Dept: ONCOLOGY | Facility: CLINIC | Age: 62
End: 2022-07-13

## 2022-07-13 NOTE — TELEPHONE ENCOUNTER
Hub is instructed to read the documentation below to patient  ATTEMPTED TO CONTACT PATIENT REGARDING MD F//U APPT.  NO ANSWER.  LMV ASKING HER TO CALL BACK.

## 2022-07-14 ENCOUNTER — TELEPHONE (OUTPATIENT)
Dept: ONCOLOGY | Facility: CLINIC | Age: 62
End: 2022-07-14

## 2022-07-14 NOTE — TELEPHONE ENCOUNTER
SPOKE WITH PATIENT REGARDING MD F/U APPT.  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

## 2022-07-25 RX ORDER — LIDOCAINE HYDROCHLORIDE 10 MG/ML
10 INJECTION, SOLUTION INFILTRATION; PERINEURAL ONCE
Status: CANCELLED | OUTPATIENT
Start: 2022-07-25

## 2022-07-27 ENCOUNTER — SPECIALTY PHARMACY (OUTPATIENT)
Dept: PHARMACY | Facility: HOSPITAL | Age: 62
End: 2022-07-27

## 2022-07-27 ENCOUNTER — TELEPHONE (OUTPATIENT)
Dept: ONCOLOGY | Facility: CLINIC | Age: 62
End: 2022-07-27

## 2022-07-27 NOTE — PROGRESS NOTES
Specialty Pharmacy Note: Ibrance-Not Taking    Called Monroeangelchris to see if she had restarted Ibrance as planned.  She has not restarted and is also taking a two month break from Arimidex due to side effects.  She is still having brain fog after being off the medication but overall feels better after stopping.  She is not interested in resuming Ibrance but said she was planning to start taking her Arimidex again on Monday 8/1/22.  She also expressed that she is tired of fighting cancer and doesn't like the fact that there is not a known time of how long treatment will work and has considered stopping treatment and is okay with death.  Notified Dr. Nunez via [employer] that patient has stopped treatment and also have asked social work to follow up with patient.            Thanks,    Eda HIRSCH, PharmD

## 2022-07-27 NOTE — TELEPHONE ENCOUNTER
OSW received chat from OP Pharmacist that patient has stopped her medication.    OSW called patient, who was upset when OSW called, stating 'if you can't fix crazy or stupid, then you can't help me.' She reports conflict with daughter in law and son, getting messages that her grand-children hate her. She reports 'I don't care if I live or die right now.' She denies wanting to harm herself, and reports her reason for living is her .     OSW provided emotional support. OSW also attempted to connect patient to resources, but patient declined every resource stating she's 'fine unless you can fix stupid or dumb.'     OSW will remain available.     Nai Jane, LANCEW, CSW, MSW  Oncology MSW  [facility]

## 2022-08-01 ENCOUNTER — HOSPITAL ENCOUNTER (OUTPATIENT)
Dept: PAIN MEDICINE | Facility: HOSPITAL | Age: 62
Discharge: HOME | End: 2022-08-01

## 2022-08-01 VITALS
HEIGHT: 61 IN | RESPIRATION RATE: 16 BRPM | WEIGHT: 150 LBS | BODY MASS INDEX: 28.32 KG/M2 | DIASTOLIC BLOOD PRESSURE: 96 MMHG | OXYGEN SATURATION: 99 % | HEART RATE: 71 BPM | TEMPERATURE: 97.3 F | SYSTOLIC BLOOD PRESSURE: 168 MMHG

## 2022-08-01 DIAGNOSIS — R52 PAIN: ICD-10-CM

## 2022-08-01 DIAGNOSIS — G89.3 CANCER ASSOCIATED PAIN: Primary | ICD-10-CM

## 2022-08-01 PROCEDURE — 62370 ANL SP INF PMP W/MDREPRG&FIL: CPT | Performed by: STUDENT IN AN ORGANIZED HEALTH CARE EDUCATION/TRAINING PROGRAM

## 2022-08-01 PROCEDURE — 77003 FLUOROGUIDE FOR SPINE INJECT: CPT

## 2022-08-01 RX ORDER — LIDOCAINE HYDROCHLORIDE 10 MG/ML
10 INJECTION, SOLUTION INFILTRATION; PERINEURAL ONCE
Status: DISCONTINUED | OUTPATIENT
Start: 2022-08-01 | End: 2022-08-02 | Stop reason: HOSPADM

## 2022-08-01 RX ORDER — LIDOCAINE HYDROCHLORIDE 10 MG/ML
10 INJECTION, SOLUTION INFILTRATION; PERINEURAL ONCE
Status: CANCELLED | OUTPATIENT
Start: 2022-08-01

## 2022-08-01 NOTE — PROCEDURES
Intrathecal Pump Refill / Reprogram with Fluoroscopic Guidance:    PREOPERATIVE DIAGNOSIS:  Presence of IDDS system.  Cancer pain.    POSTOPERATIVE DIAGNOSIS:  Same as preop diagnosis    PROCEDURE:   Intrathecal pump Refill / Reprogram requiring MD expertise, and requirement for fluoroscopic guidance.  CPT 30475, 92430    IDDS System:   Medtronic Synchromed II      PRE-PROCEDURE DISCUSSION WITH PATIENT:    Risks and complications were discussed with the patient prior to starting the procedure and informed consent was obtained.  We discussed various topics including but not limited to bleeding, infection, injury, nerve injury, paralysis, coma, overdose, reaction to injectate and/or medication, death, postprocedural painful flare-up, postprocedural site soreness, and a lack of pain relief resulting from the procedure or the knowledge gained from the procedure, and a risk of equipment malfunction or damage.        SURGEON:  Chuck Hunter MD    REASON FOR PROCEDURE:     The intrathecal pump could not be filled with a blind percutaneous technique in the office.  After multiple attempts, the procedure in the office was aborted and the patient was scheduled for image-guided refill for patient safety.      SEDATION:  Patient declined administration of moderate sedation      LOCAL ANESTHETICS:  NONE,     DESCRIPTON OF PROCEDURE:  The patient taken to the operating room and was placed in the prone  position.  All pressure points were well padded.     The pump was interrogated.  The pump is currently running a solution of Morphine at a concentration of 1mg/ml and at a dose of 0.1600 on the basal infusion.  In addition, a PTM dose was not programmed.    The site of the pump was identified.  The appropriate area was prepped with Chloraprep and draped in a sterile fashion.    The area overlying the central port was not anesthetized with subcutaneous solution of local anesthetic.  Fluoroscopy was utilized to visualize the  orientation of the pump in its pocket.  The proprietary pump refill kit was used to puncture the skin and enter into the reservoir access port.      Aspiration was attempted and successful.   The Expected Residual Volume (ERV) was 3.5.  The Actual Residual Volume aspirated was 3.0.  This amount was discarded.       The pump was then refilled with Morphine 1mg/ml.  The infusion program was changed.      Basal Rate: 0.1800    Refill Date: 11/8/22    The needle was removed intact.  Vital signs were stable throughout.        ESTIMATED BLOOD LOSS:  none  SPECIMENS:  none    COMPLICATIONS:   No complications were noted.    TOLERANCE & DISCHARGE CONDITION:    The patient tolerated the procedure well.  Pump site is intact with minimal tenderness, and no erythema nor drainage.  The patient was transported to the recovery area without difficulties.  The patient was discharged to home under the care of family in stable and satisfactory condition.      PLAN OF CARE:  1. The patient was given our standard instruction sheet.  2. The patient will Plan for refill prior to refill date.  3. The patient will resume all medications as per the medication reconciliation sheet.        4.   Patient will return for pump refill when due or sooner if needed.

## 2022-08-02 ENCOUNTER — TELEPHONE (OUTPATIENT)
Dept: PAIN MEDICINE | Facility: HOSPITAL | Age: 62
End: 2022-08-02

## 2022-08-15 ENCOUNTER — TELEPHONE (OUTPATIENT)
Dept: FAMILY MEDICINE CLINIC | Facility: CLINIC | Age: 62
End: 2022-08-15

## 2022-08-15 NOTE — TELEPHONE ENCOUNTER
Caller: Gladys Garrison    Relationship: Self    Best call back number:[phone]    What medication are you requesting: INSULIN NEEDLES 100 UNITS SMALLER SIZE NEEDLE     What are your current symptoms: DIABETIC    How long have you been experiencing symptoms:     Have you had these symptoms before:    [x] Yes  [] No    Have you been treated for these symptoms before:   [x] Yes  [] No    If a prescription is needed, what is your preferred pharmacy and phone number: [facility] [address] [place] [address] [phone] [place] [phone] FX     Additional notes:HAS 3 NEEDLES LEFT , 2 SHOTS A DAY

## 2022-08-16 NOTE — TELEPHONE ENCOUNTER
A DOCTOR NEEDS TO REVIEW AND PRESCRIBE FOR THIS.  ROUTING NOTE TO DR DELAROSA SINCE THAT IS WHO SHE WILL BE SEEING

## 2022-08-16 NOTE — TELEPHONE ENCOUNTER
I SCHEDULED PATIENT TO ESTABLISH WITH DR DELAROSA ON 8/23/22. ACCORDING TO THE CHART WE HAVE NOT PRESCRIBED INSULIN OR NEEDLES IN OVER 2 YEARS. PATIENT SAID ONCE A MONTH SHE DRIVES DOWN TO [place] TO GET INSULIN FOR $5 A BOTTLE FROM . THEY ALSO GIVE HER NEEDLES AND HE DID NOT EXPLAIN WHY SHE RAN OUT OF NEEDLES EARLY. SHE SAID SHE WILL BE OUT TONIGHT AND NEEDS US TO SEND IN THE NEEDLES BEFORE SHE CAN GIVE HERSELF THE NEXT DOSE. SHE FEELS THAT WE DO NOT UNDERSTAND HOW SERIOUS THIS IS.

## 2022-08-16 NOTE — TELEPHONE ENCOUNTER
Doesn't look like she has had labs checked for DM since June 2021. She was seeing Dr. Rey for dm at 1 time, not sure what happened to that. Since she was Dr. Dasilva pt she needs to get in to get established with Dr. Robles and have all this checked out.

## 2022-08-17 DIAGNOSIS — Z79.4 CONTROLLED TYPE 2 DIABETES MELLITUS WITHOUT COMPLICATION, WITH LONG-TERM CURRENT USE OF INSULIN: Primary | ICD-10-CM

## 2022-08-17 DIAGNOSIS — E11.9 CONTROLLED TYPE 2 DIABETES MELLITUS WITHOUT COMPLICATION, WITH LONG-TERM CURRENT USE OF INSULIN: Primary | ICD-10-CM

## 2022-08-17 RX ORDER — FLURBIPROFEN SODIUM 0.3 MG/ML
SOLUTION/ DROPS OPHTHALMIC
Qty: 100 EACH | Refills: 6 | Status: SHIPPED | OUTPATIENT
Start: 2022-08-17 | End: 2022-08-22 | Stop reason: SDUPTHER

## 2022-08-19 NOTE — PLAN OF CARE
Problem: Patient Care Overview  Goal: Plan of Care Review  Outcome: Ongoing (interventions implemented as appropriate)  Flowsheets (Taken 12/22/2019 1642)  Progress: no change  Plan of Care Reviewed With: patient  Outcome Summary: new admit  Goal: Individualization and Mutuality  Outcome: Ongoing (interventions implemented as appropriate)  Goal: Discharge Needs Assessment  Outcome: Ongoing (interventions implemented as appropriate)  Goal: Interprofessional Rounds/Family Conf  Outcome: Ongoing (interventions implemented as appropriate)     Problem: Cardiac: ACS (Acute Coronary Syndrome) (Adult)  Goal: Signs and Symptoms of Listed Potential Problems Will be Absent, Minimized or Managed (Cardiac: ACS)  Outcome: Ongoing (interventions implemented as appropriate)     Problem: Fall Risk (Adult)  Goal: Identify Related Risk Factors and Signs and Symptoms  Outcome: Ongoing (interventions implemented as appropriate)  Goal: Absence of Fall  Outcome: Ongoing (interventions implemented as appropriate)      Mammogram is normal   She does have dense breasts,  Which decreases sensitivity of mammogram.   Breast ultrasound is recommended.   OK to order per protocol     I have not seen Jewels Queen in over one year  Schedule yearly physical with me or a trusted APN

## 2022-08-22 ENCOUNTER — TELEPHONE (OUTPATIENT)
Dept: FAMILY MEDICINE CLINIC | Facility: CLINIC | Age: 62
End: 2022-08-22

## 2022-08-22 DIAGNOSIS — E11.9 CONTROLLED TYPE 2 DIABETES MELLITUS WITHOUT COMPLICATION, WITH LONG-TERM CURRENT USE OF INSULIN: ICD-10-CM

## 2022-08-22 DIAGNOSIS — Z79.4 CONTROLLED TYPE 2 DIABETES MELLITUS WITHOUT COMPLICATION, WITH LONG-TERM CURRENT USE OF INSULIN: ICD-10-CM

## 2022-08-22 RX ORDER — FLURBIPROFEN SODIUM 0.3 MG/ML
SOLUTION/ DROPS OPHTHALMIC
Qty: 100 EACH | Refills: 0 | Status: SHIPPED | OUTPATIENT
Start: 2022-08-22

## 2022-08-22 RX ORDER — FLURBIPROFEN SODIUM 0.3 MG/ML
SOLUTION/ DROPS OPHTHALMIC
Qty: 100 EACH | Refills: 0 | Status: SHIPPED | OUTPATIENT
Start: 2022-08-22 | End: 2022-08-22 | Stop reason: SDUPTHER

## 2022-08-22 NOTE — TELEPHONE ENCOUNTER
Caller: Gladys Garrison    Relationship: Self    Best call back number: [phone]    Requested Prescriptions:   INSULIN NEEDLES    Pharmacy where request should be sent: [facility] [address] [place] [address] [phone] [facility] [phone]      Additional details provided by patient: PATIENT DOES NOT NEED THE INSULIN PEN NEEDLES SENT ON 8/22 SHE JUST NEEDS NEEDLES THAT ARE NOT FILLED    Does the patient have less than a 3 day supply:  [x] Yes  [] No    William Conner Rep   08/22/22 12:32 EDT

## 2022-08-26 ENCOUNTER — APPOINTMENT (OUTPATIENT)
Dept: LAB | Facility: HOSPITAL | Age: 62
End: 2022-08-26

## 2022-09-02 ENCOUNTER — TELEPHONE (OUTPATIENT)
Dept: ONCOLOGY | Facility: CLINIC | Age: 62
End: 2022-09-02

## 2022-09-02 ENCOUNTER — OFFICE VISIT (OUTPATIENT)
Dept: FAMILY MEDICINE CLINIC | Facility: CLINIC | Age: 62
End: 2022-09-02

## 2022-09-02 VITALS
WEIGHT: 155 LBS | SYSTOLIC BLOOD PRESSURE: 160 MMHG | BODY MASS INDEX: 29.27 KG/M2 | HEART RATE: 64 BPM | RESPIRATION RATE: 16 BRPM | DIASTOLIC BLOOD PRESSURE: 88 MMHG | HEIGHT: 61 IN | OXYGEN SATURATION: 100 %

## 2022-09-02 DIAGNOSIS — I10 PRIMARY HYPERTENSION: Chronic | ICD-10-CM

## 2022-09-02 DIAGNOSIS — E11.65 TYPE 2 DIABETES MELLITUS WITH HYPERGLYCEMIA, WITH LONG-TERM CURRENT USE OF INSULIN: Primary | ICD-10-CM

## 2022-09-02 DIAGNOSIS — Z79.4 TYPE 2 DIABETES MELLITUS WITH HYPERGLYCEMIA, WITH LONG-TERM CURRENT USE OF INSULIN: Primary | ICD-10-CM

## 2022-09-02 DIAGNOSIS — E78.5 HYPERLIPIDEMIA, UNSPECIFIED HYPERLIPIDEMIA TYPE: Chronic | ICD-10-CM

## 2022-09-02 PROCEDURE — 99213 OFFICE O/P EST LOW 20 MIN: CPT | Performed by: STUDENT IN AN ORGANIZED HEALTH CARE EDUCATION/TRAINING PROGRAM

## 2022-09-02 NOTE — PROGRESS NOTES
"Chief Complaint  Chief Complaint   Patient presents with   • Diabetes       Subjective        Gladys Garrison is a 62 y.o. female who presents to [facility].  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  She is here today to follow-up on her diabetes, hypertension.  She does see pain control and has a pain pump in place.  No longer taking ibrance.  Missed a follow-up appointment recently with oncology and was not able to get rescheduled with them until October.  She tells me that she knows she is dying and she has come to terms with that but finds it most difficult to watch those that she loves struggling with the thought of losing her.    For her insulin she takes 70/30 insulin: 30 - 40 units in the am and 30 units at night.  Her blood sugars run between 120-170.  She thinks her blood pressure is elevated today because of stress.  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    Objective   /88 (BP Location: Left arm, Patient Position: Sitting)   Pulse 64   Resp 16   Ht 154.9 cm (60.98\")   Wt 70.3 kg (155 lb)   SpO2 100%   BMI 29.30 kg/m²     Estimated body mass index is 29.3 kg/m² as calculated from the following:    Height as of this encounter: 154.9 cm (60.98\").    Weight as of this encounter: 70.3 kg (155 lb).     Physical Exam   GEN: In no acute distress, non toxic appearing  HEENT: Pupils equal and reactive to light, sclera clear.   CV: Regular rate and rhythm, no murmurs, 2+ peripheral pulses, No extremity edema.   RESP: Lungs clear to auscultation anteriorly and posteriorly in all lung fields bilaterally.  NEURO: AAO to person, place, and time. CN 2-12 intact " grossly.     Result Review :    The following data was reviewed by: Chirag Robles DO on 09/02/2022:  CMP    CMP 12/20/21 3/29/22 5/19/22   Glucose 238 (A) 48 (A) 61 (A)   BUN 18 17 16   Creatinine 0.83 0.90 0.77   eGFR Non African Am 70     Sodium 139 140 141   Potassium 4.1 4.4 4.0   Chloride 104 105 105   Calcium 9.2 9.6 10.0   Albumin 3.90  4.40   Total Bilirubin 0.5  0.3   Alkaline Phosphatase 84  99   AST (SGOT) 21  24   ALT (SGPT) 14  18   (A) Abnormal value       Comments are available for some flowsheets but are not being displayed.           CBC w/diff    CBC w/Diff 12/20/21 3/22/22 5/19/22   WBC 6.08 4.88 5.70   RBC 4.37 4.57 4.60   Hemoglobin 13.1 13.3 13.2   Hematocrit 40.2 42.1 41.7   MCV 92.0 92.1 90.5   MCH 30.0 29.1 28.8   MCHC 32.6 31.6 31.8   RDW 13.3 16.9 (A) 14.1   Platelets 91 (A) 120 (A) 113 (A)   Neutrophil Rel % 73.3 72.2 67.4   Lymphocyte Rel % 16.9 (A) 21.1 22.6   Monocyte Rel % 7.2 4.5 (A) 6.8   Eosinophil Rel % 2.3 1.8 2.6   Basophil Rel % 0.3 0.4 0.6   (A) Abnormal value                          Assessment and Plan     Diagnoses and all orders for this visit:    1. Type 2 diabetes mellitus with hyperglycemia, with long-term current use of insulin (HCC) (Primary)  Continue insulin 70/30 at current dosage.  A1c ordered today.  Can change dose based on results.  Kidney function was normal in May, could consider rechecking metabolic panel at her 3-month follow-up.  Continue her lisinopril for kidney protection.  Continue her statin for vascular protection.  She would benefit from an SGLT2 inhibitor given her heart history, however diabetes is understandably not at the forefront of her mind given her cancer diagnosis.  We did discuss that keeping her diabetes under good control will increase her quality of life for what ever time she has left.  -     Hemoglobin A1c    2. Primary hypertension  Elevated today, she thinks due to stress.  Continue her lisinopril at the current dosage,  follow-up in 3 months.    3. Hyperlipidemia, unspecified hyperlipidemia type  Continue her atorvastatin, we discussed rechecking her cholesterol today but she would prefer to hold off for now.       Follow Up     Return in about 3 months (around 12/2/2022) for Recheck.

## 2022-09-02 NOTE — TELEPHONE ENCOUNTER
Caller: LUCIEN    Relationship to patient: SELF    Best call back number: [phone]    Patient is needing: TO R/S MISSED 8- LAB AND F/U APPT.

## 2022-10-06 ENCOUNTER — OFFICE VISIT (OUTPATIENT)
Dept: ONCOLOGY | Facility: CLINIC | Age: 62
End: 2022-10-06

## 2022-10-06 ENCOUNTER — LAB (OUTPATIENT)
Dept: LAB | Facility: HOSPITAL | Age: 62
End: 2022-10-06

## 2022-10-06 VITALS
WEIGHT: 150 LBS | SYSTOLIC BLOOD PRESSURE: 171 MMHG | TEMPERATURE: 96.9 F | RESPIRATION RATE: 18 BRPM | HEART RATE: 83 BPM | DIASTOLIC BLOOD PRESSURE: 103 MMHG | BODY MASS INDEX: 28.32 KG/M2 | HEIGHT: 61 IN

## 2022-10-06 DIAGNOSIS — C50.919 MALIGNANT NEOPLASM OF FEMALE BREAST, UNSPECIFIED ESTROGEN RECEPTOR STATUS, UNSPECIFIED LATERALITY, UNSPECIFIED SITE OF BREAST: Primary | ICD-10-CM

## 2022-10-06 DIAGNOSIS — I37.9 PULMONARY VALVE DISORDER: ICD-10-CM

## 2022-10-06 DIAGNOSIS — I27.20 PULMONARY HYPERTENSION: ICD-10-CM

## 2022-10-06 DIAGNOSIS — M89.8X9 BONE PAIN: ICD-10-CM

## 2022-10-06 DIAGNOSIS — C79.51 SPINE METASTASIS: ICD-10-CM

## 2022-10-06 DIAGNOSIS — U07.1 COVID-19: ICD-10-CM

## 2022-10-06 LAB
ALBUMIN SERPL-MCNC: 4.2 G/DL (ref 3.5–5.2)
ALBUMIN/GLOB SERPL: 1.2 G/DL
ALP SERPL-CCNC: 112 U/L (ref 39–117)
ALT SERPL W P-5'-P-CCNC: 13 U/L (ref 1–33)
ANION GAP SERPL CALCULATED.3IONS-SCNC: 12 MMOL/L (ref 5–15)
AST SERPL-CCNC: 19 U/L (ref 1–32)
BASOPHILS # BLD AUTO: 0.01 10*3/MM3 (ref 0–0.2)
BASOPHILS NFR BLD AUTO: 0.1 % (ref 0–1.5)
BILIRUB SERPL-MCNC: 0.3 MG/DL (ref 0–1.2)
BUN SERPL-MCNC: 10 MG/DL (ref 8–23)
BUN/CREAT SERPL: 14.3 (ref 7–25)
CALCIUM SPEC-SCNC: 9.4 MG/DL (ref 8.6–10.5)
CHLORIDE SERPL-SCNC: 99 MMOL/L (ref 98–107)
CO2 SERPL-SCNC: 27 MMOL/L (ref 22–29)
CREAT SERPL-MCNC: 0.7 MG/DL (ref 0.57–1)
DEPRECATED RDW RBC AUTO: 39.9 FL (ref 37–54)
EGFRCR SERPLBLD CKD-EPI 2021: 99.2 ML/MIN/1.73
EOSINOPHIL # BLD AUTO: 0.06 10*3/MM3 (ref 0–0.4)
EOSINOPHIL NFR BLD AUTO: 0.9 % (ref 0.3–6.2)
ERYTHROCYTE [DISTWIDTH] IN BLOOD BY AUTOMATED COUNT: 12.8 % (ref 12.3–15.4)
GLOBULIN UR ELPH-MCNC: 3.4 GM/DL
GLUCOSE SERPL-MCNC: 212 MG/DL (ref 65–99)
HCT VFR BLD AUTO: 39.1 % (ref 34–46.6)
HGB BLD-MCNC: 12.7 G/DL (ref 12–15.9)
HOLD SPECIMEN: NORMAL
HOLD SPECIMEN: NORMAL
LYMPHOCYTES # BLD AUTO: 1.56 10*3/MM3 (ref 0.7–3.1)
LYMPHOCYTES NFR BLD AUTO: 23.3 % (ref 19.6–45.3)
MCH RBC QN AUTO: 28.4 PG (ref 26.6–33)
MCHC RBC AUTO-ENTMCNC: 32.5 G/DL (ref 31.5–35.7)
MCV RBC AUTO: 87.5 FL (ref 79–97)
MONOCYTES # BLD AUTO: 0.42 10*3/MM3 (ref 0.1–0.9)
MONOCYTES NFR BLD AUTO: 6.3 % (ref 5–12)
NEUTROPHILS NFR BLD AUTO: 4.65 10*3/MM3 (ref 1.7–7)
NEUTROPHILS NFR BLD AUTO: 69.4 % (ref 42.7–76)
PLATELET # BLD AUTO: 131 10*3/MM3 (ref 140–450)
PMV BLD AUTO: 9.4 FL (ref 6–12)
POTASSIUM SERPL-SCNC: 4.3 MMOL/L (ref 3.5–5.2)
PROT SERPL-MCNC: 7.6 G/DL (ref 6–8.5)
RBC # BLD AUTO: 4.47 10*6/MM3 (ref 3.77–5.28)
SODIUM SERPL-SCNC: 138 MMOL/L (ref 136–145)
WBC NRBC COR # BLD: 6.7 10*3/MM3 (ref 3.4–10.8)

## 2022-10-06 PROCEDURE — 82378 CARCINOEMBRYONIC ANTIGEN: CPT | Performed by: INTERNAL MEDICINE

## 2022-10-06 PROCEDURE — 36415 COLL VENOUS BLD VENIPUNCTURE: CPT | Performed by: INTERNAL MEDICINE

## 2022-10-06 PROCEDURE — 85025 COMPLETE CBC W/AUTO DIFF WBC: CPT

## 2022-10-06 PROCEDURE — 86300 IMMUNOASSAY TUMOR CA 15-3: CPT | Performed by: INTERNAL MEDICINE

## 2022-10-06 PROCEDURE — 99215 OFFICE O/P EST HI 40 MIN: CPT | Performed by: INTERNAL MEDICINE

## 2022-10-06 PROCEDURE — 80053 COMPREHEN METABOLIC PANEL: CPT | Performed by: INTERNAL MEDICINE

## 2022-10-06 NOTE — PROGRESS NOTES
Hematology/Oncology Outpatient Follow Up    PATIENT NAME:Gladys Garrison  :1960  MRN: [id number]  PRIMARY CARE PHYSICIAN: Chirag Robles DO  REFERRING PHYSICIAN: Chirag Robles, *    Chief Complaint   Patient presents with   • Follow-up     Malignant neoplasm of female breast, unspecified estrogen receptor status, unspecified laterality, unspecified site of breast (HCC)        HISTORY OF PRESENT ILLNESS:     This is a 60-year-old female who multiple comorbidities including congenital abnormalities such as hypoplastic kidney, tetralogy of Fallot, coarctation of the aorta and congenital VSD status post repair.  Patient developed syncopal episode and for that reason she had she had a CT scan of the chest which showed mass in the left breast.  She had diagnostic mammogram and ultrasound which showed a 2 cm spiculated mass in the left breast at the 1 o'clock position 6 cm from the nipple.  Biopsy of the left breast mass revealed invasive moderately differentiated carcinoma ER/NC positive and HER-2/bishop negative.  Patient also had an ultrasound of the axilla which was concerning for an abnormal left axillary lymph node with cortical thickening. She apparently had an FNA of the left axillary nodule which was positive for malignancy.  On 2017 patient underwent left modified radical mastectomy, right prophylactic mastectomy and immediate breast reconstruction. She also underwent right prophylactic mastectomy.  We have had her on records suggest that patient did have multifocal disease with pT2 pN1 aM0.  The largest focus measured 3.5 cm.  2 of 11 lymph nodes were positive for metastatic disease some with extracapsular extension.  Notes that patient did receive Arimidex neoadjuvant  from 2017 to 2018 prior to her bilateral mastectomies.    Review of her note suggest that she developed cough which she attributed to anastrozole and patient was then placed on tamoxifen.  She had  MammaPrint testing which returned with low risk for relapse.    Her postop course was also complicated by left chest wall abscess which resulted in I&D and removal of the left tissue expander. She was referred for radiation treatment boards ultimately declined.    Patient was then placed on tamoxifen in April 2018 which she stopped after less than a month due to nausea and vomiting.  Patient in the interim also was diagnosed with chronic hepatitis C was seen by the hepatologist.  Tamoxifen was dose reduced to 10 mg daily with the goal to increase to 20 mg daily.      Patient has relocated to [facility].  She has transitioned her care to us now.  She is currently not on any antiestrogen therapy.     Her bilateral mastectomy specimen are available for review    · 1/29/2021 patient had bone density which showed osteoporosis  · Patient was seen by neurosurgery and had a bone scan done in March 2021 which showed subtle activity in the inferior L4 vertebral body appears to correlate with new area of sclerosis on CT of the abdomen and pelvis.  There is also subtle activity with possible new lesion at the posterior left sacrum.  These findings were concerning for metastatic disease.  PET CT scan was recommended to further evaluate.  · 4/8/2021 patient had a PET CT scan which showed evidence of disease in the neck chest abdomen and pelvis.  But she has a 1.1 cm mixed lytic/sclerotic hypermetabolic lesion within the left hemisacrum consistent with metastatic disease.  There is also accumulation within the inferior endplate of the L4 vertebral body thought to correspond to 1.2 centimeters sclerotic lesion consistent with metastatic disease.  There is a tiny hypermetabolic focus within the L3, T11.  Suspicious for also early metastatic disease.  · 4/26/2021 patient underwent CT-guided biopsy of sacral lesion, pathology was consistent with metastatic breast cancer ER and NH positive HER-2/bishop was negative.  · 7/6/21 CT new  sclerosis within the right 12th rib posteriorly which could represent metastatic lesion or a healed or healing fracture, new sclerosis within the right 12th rib posteriorly which         could represent metastatic lesion,new sclerosis within the right T8 transverse process worrisome for metastatic        disease progression.  · 7/7/21 complaints of 8/10 back pain and 8/10 LUQ pain. Referral to radiation for questionable mets on CT chest.  · 7/26/2021 patient had CT scan of the head with contrast with did not show any evidence of metastatic disease.  CT scan of the chest abdomen and pelvis did not show any evidence of progressive disease.  · 7/26/2021 patient had CEA level which shows declining values CA 15-3 has decreased to 62 from 84  · 11/3/2021: Patient had CT scan of the chest, abdomen and pelvis. In the chest there were no suspicious pulmonary nodules. There is no clear evidence of progressive disease  · 12/13/2021 patient had a bone scan which showed uptake along the posterior right ribs consistent with rib fractures.  There is mild uptake in the L4 vertebral body corresponding to the sclerotic lesion seen on previous CT scan.  This was likely due to metastatic disease  · 10/6/2022: Patient has been lost to follow-up.  She stopped Ibrance due to pulmonary issues.  Apparently patient went to the emergency room and was told that Ibrance was causing lung damage.          Past Medical History:   Diagnosis Date   • Allergic    • Bone cancer (HCC)     METASTATIC BONE   • Bradycardia     SECONDARY TO ABLATION   • Breast cancer (HCC) 2017    mets to lymph nodes; did not do radiation   • Cancer of unknown origin (HCC)    • Compression fx, thoracic spine, open, initial encounter (HCC)    • Coronary artery disease    • COVID-19 09/01/2021   • Diabetes mellitus (HCC)    • Heart disease, unspecified    • Hepatitis C     RESOLVED WITH MEDICATION   • Hyperlipidemia    • Hypertension    • Obesity (BMI 30-39.9) 02/05/2021   •  Rib fracture     Per patient   • Sleep apnea     no machine   • Tachycardia     ATRIAL   • Type 2 diabetes mellitus (HCC) 2017       Past Surgical History:   Procedure Laterality Date   • BACK SURGERY      neck X 2   • BREAST RECONSTRUCTION Bilateral    • CARDIAC ABLATION       atrial tachycardia x 5 ablations    • CARDIAC CATHETERIZATION     • CARDIAC SURGERY      stent placed in aorta   • CARDIAC SURGERY      6 surgeries as baby    • CERVICAL FUSION ANTERIOR WITH ARTIFICIAL DISCECTOMY IMPLANTATION N/A 2020    Procedure: C4 VERTEBRECTOMY AND ANTERIOR CERIVCAL DISCECTOMY WITH FUSION OF CERVICAL THREE THROUGH FIVE WITH REMOVAL OF HARDWARE C5-C6;  Surgeon: Mark Kaba MD;  Location: [facility] MAIN OR;  Service: Neurosurgery;  Laterality: N/A;   •  SECTION      x2   • COLONOSCOPY N/A 10/23/2020    Procedure: COLONOSCOPY WITH POLYPECTOMY X6;  Surgeon: Stanley Bourne MD;  Location: [facility] ENDOSCOPY;  Service: Gastroenterology;  Laterality: N/A;  POLYPS, INTERNAL HEMORRHOIDS   • ENDOMETRIAL ABLATION      REMOVAL SCAR TISSUE UTERINE   • ENDOSCOPY N/A 10/23/2020    Procedure: ESOPHAGOGASTRODUODENOSCOPY WITH BIOPSY X 1 AREA;  Surgeon: Stanley Bourne MD;  Location: [facility] ENDOSCOPY;  Service: Gastroenterology;  Laterality: N/A;  GASTRITIS, ESOPHAGITIS, HIATAL HERNIA   • KNEE SURGERY     • MASTECTOMY Bilateral    • NECK SURGERY     • PACEMAKER IMPLANTATION     • PAIN PUMP INSERTION/REVISION N/A 2022    Procedure: PAIN PUMP INSERTION AND INTRATHECAL CATHETER PLACEMENT;  Surgeon: Chuck Hunter MD;  Location: [facility] MAIN OR;  Service: Pain Management;  Laterality: N/A;         Current Outpatient Medications:   •  acetaminophen (TYLENOL) 650 MG 8 hr tablet, Take 650 mg by mouth As Needed for Mild Pain . TAKES BETWEEN PAIN MEDS, Disp: , Rfl:   •  anastrozole (ARIMIDEX) 1 MG tablet, Take 1 tablet by mouth Daily., Disp: 30 tablet, Rfl: 6  •  aspirin 81 MG chewable tablet,  Chew 1 tablet Daily., Disp: 30 tablet, Rfl: 1  •  Blood Glucose Monitoring Suppl (Accu-Chek Araceli) device, Use as instructed   To test blood sugar bid, Disp: 1 each, Rfl: 0  •  Calcium Carbonate-Vit D-Min (Calcium 600+D Plus Minerals) 600-400 MG-UNIT chewable tablet, Chew 600 mg 2 (Two) Times a Day. (Patient taking differently: Chew 600 mg 2 (Two) Times a Day. GUMMIES), Disp: 60 each, Rfl: 6  •  glucose blood (Accu-Chek Araceli Plus) test strip, Use as instructed   To test bid, Disp: 200 each, Rfl: 3  •  ibuprofen (ADVIL,MOTRIN) 400 MG tablet, Take 400 mg by mouth As Needed for Mild Pain . IN BETWEEN PAIN  MEDS, Disp: , Rfl:   •  insulin NPH-insulin regular (humuLIN 70/30,novoLIN 70/30) (70-30) 100 UNIT/ML injection, Inject 40 Units under the skin into the appropriate area as directed Every Morning., Disp: , Rfl:   •  insulin NPH-insulin regular (humuLIN 70/30,novoLIN 70/30) (70-30) 100 UNIT/ML injection, Inject 30 Units under the skin into the appropriate area as directed Every Evening., Disp: , Rfl:   •  Insulin Pen Needle (B-D UF III MINI PEN NEEDLES) 31G X 5 MM misc, Use to inject insulin twice daily   DX: E11.9, Disp: 100 each, Rfl: 0  •  Lancets (accu-chek soft touch) lancets, Test bid, Disp: 200 each, Rfl: 3  •  lisinopril (PRINIVIL,ZESTRIL) 20 MG tablet, Take 20 mg by mouth Daily., Disp: , Rfl:   •  Palbociclib (Ibrance) 125 MG capsule capsule, Take 1 capsule by mouth Daily. Take on days 1-21 with food and then off for 7 days., Disp: 21 capsule, Rfl: 6  •  rosuvastatin (Crestor) 20 MG tablet, Take 1 tablet by mouth Every Night., Disp: 90 tablet, Rfl: 0    Allergies   Allergen Reactions   • Promethazine Other (See Comments)     Hyperactive mean   • Tape Other (See Comments)     .blisters         Family History   Problem Relation Age of Onset   • Heart disease Mother    • Stroke Mother    • Lung cancer Mother    • Aneurysm Father    • Diabetes Sister    • No Known Problems Brother    • No Known Problems Brother   "  • Diabetes type I Half-Sister    • Thyroid cancer Half-Sister    • Cancer Maternal Aunt    • Heart attack Sister    • Thyroid disease Sister    • No Known Problems Sister        Cancer-related family history includes Cancer in her maternal aunt; Lung cancer in her mother; Thyroid cancer in her half-sister.    Social History     Tobacco Use   • Smoking status: Never Smoker   • Smokeless tobacco: Never Used   Vaping Use   • Vaping Use: Never used   Substance Use Topics   • Alcohol use: Yes     Alcohol/week: 1.0 standard drink     Types: 1 Glasses of wine per week     Comment: rare   • Drug use: Not Currently       I have reviewed and confirmed the accuracy of the patient's history: Chief complaint, HPI, ROS and Subjective as entered by the MA/LPN/RN. Мария Nunez MD 10/06/22       SUBJECTIVE:    She is here for follow-up visit after a prolonged absence as she was last seen by me March 2022          REVIEW OF SYSTEMS:    Review of Systems   Constitutional: Negative for chills and fever.   HENT: Negative for ear pain, mouth sores, nosebleeds and sore throat.    Eyes: Negative for photophobia and visual disturbance.   Respiratory: Negative for wheezing and stridor.    Cardiovascular: Negative for chest pain and palpitations.   Gastrointestinal: Negative for abdominal pain, diarrhea, nausea and vomiting.   Endocrine: Negative for cold intolerance and heat intolerance.   Genitourinary: Negative for dysuria and hematuria.   Musculoskeletal: Negative for joint swelling and neck stiffness.   Skin: Negative for color change and rash.   Neurological: Negative for seizures and syncope.   Hematological: Negative for adenopathy.        No obvious bleeding   Psychiatric/Behavioral: Negative for agitation, confusion and hallucinations.     OBJECTIVE:    Vitals:    10/06/22 1510   BP: (!) 171/103   Pulse: 83   Resp: 18   Temp: 96.9 °F (36.1 °C)   TempSrc: Infrared   Weight: 68 kg (150 lb)   Height: 154.9 cm (61\") "   PainSc:   8     Body mass index is 28.34 kg/m².    ECOG    (1) Restricted in physically strenuous activity, ambulatory and able to do work of light nature    Physical Exam  Vitals and nursing note reviewed.   Constitutional:       General: She is not in acute distress.     Appearance: Normal appearance. She is not diaphoretic.   HENT:      Head: Normocephalic and atraumatic.      Mouth/Throat:      Mouth: Mucous membranes are moist.      Pharynx: Oropharynx is clear.   Eyes:      General: No scleral icterus.        Right eye: No discharge.         Left eye: No discharge.      Extraocular Movements: Extraocular movements intact.      Conjunctiva/sclera: Conjunctivae normal.   Neck:      Thyroid: No thyromegaly.   Cardiovascular:      Rate and Rhythm: Normal rate and regular rhythm.      Pulses: Normal pulses.      Heart sounds: Normal heart sounds.     No friction rub. No gallop.   Pulmonary:      Effort: Pulmonary effort is normal. No respiratory distress.      Breath sounds: Normal breath sounds. No stridor. No wheezing.   Abdominal:      General: Bowel sounds are normal. There is distension.      Palpations: Abdomen is soft. There is no mass.      Tenderness: There is abdominal tenderness. There is guarding (Left upper abdomen). There is no rebound.   Musculoskeletal:         General: No tenderness. Normal range of motion.      Cervical back: Normal range of motion and neck supple.      Right lower leg: No edema.      Left lower leg: No edema.      Comments: Neck pain.  Patient has a neck collar.   Lymphadenopathy:      Cervical: No cervical adenopathy.   Skin:     General: Skin is warm and dry.      Capillary Refill: Capillary refill takes less than 2 seconds.      Findings: No bruising, erythema or rash.   Neurological:      Mental Status: She is alert and oriented to person, place, and time.      Cranial Nerves: No cranial nerve deficit.      Sensory: No sensory deficit.      Motor: No abnormal muscle tone.    Psychiatric:         Mood and Affect: Mood normal.         Behavior: Behavior normal.         Thought Content: Thought content normal.         Judgment: Judgment normal.     I have reexamined the patient and the results are consistent with the previously documented exam. Мария Nunez MD       RECENT LABS    WBC   Date Value Ref Range Status   10/06/2022 6.70 3.40 - 10.80 10*3/mm3 Final     RBC   Date Value Ref Range Status   10/06/2022 4.47 3.77 - 5.28 10*6/mm3 Final     Hemoglobin   Date Value Ref Range Status   10/06/2022 12.7 12.0 - 15.9 g/dL Final     Hematocrit   Date Value Ref Range Status   10/06/2022 39.1 34.0 - 46.6 % Final     MCV   Date Value Ref Range Status   10/06/2022 87.5 79.0 - 97.0 fL Final     MCH   Date Value Ref Range Status   10/06/2022 28.4 26.6 - 33.0 pg Final     MCHC   Date Value Ref Range Status   10/06/2022 32.5 31.5 - 35.7 g/dL Final     RDW   Date Value Ref Range Status   10/06/2022 12.8 12.3 - 15.4 % Final     RDW-SD   Date Value Ref Range Status   10/06/2022 39.9 37.0 - 54.0 fl Final     MPV   Date Value Ref Range Status   10/06/2022 9.4 6.0 - 12.0 fL Final     Platelets   Date Value Ref Range Status   10/06/2022 131 (L) 140 - 450 10*3/mm3 Final     Neutrophil %   Date Value Ref Range Status   10/06/2022 69.4 42.7 - 76.0 % Final     Lymphocyte %   Date Value Ref Range Status   10/06/2022 23.3 19.6 - 45.3 % Final     Monocyte %   Date Value Ref Range Status   10/06/2022 6.3 5.0 - 12.0 % Final     Eosinophil %   Date Value Ref Range Status   10/06/2022 0.9 0.3 - 6.2 % Final     Basophil %   Date Value Ref Range Status   10/06/2022 0.1 0.0 - 1.5 % Final     Immature Grans %   Date Value Ref Range Status   07/20/2020 0.7 (H) 0.0 - 0.5 % Final     Neutrophils, Absolute   Date Value Ref Range Status   10/06/2022 4.65 1.70 - 7.00 10*3/mm3 Final     Lymphocytes, Absolute   Date Value Ref Range Status   10/06/2022 1.56 0.70 - 3.10 10*3/mm3 Final     Monocytes, Absolute   Date Value  Ref Range Status   10/06/2022 0.42 0.10 - 0.90 10*3/mm3 Final     Eosinophils, Absolute   Date Value Ref Range Status   10/06/2022 0.06 0.00 - 0.40 10*3/mm3 Final     Basophils, Absolute   Date Value Ref Range Status   10/06/2022 0.01 0.00 - 0.20 10*3/mm3 Final     Immature Grans, Absolute   Date Value Ref Range Status   07/20/2020 0.05 0.00 - 0.05 10*3/mm3 Final     nRBC   Date Value Ref Range Status   05/19/2022 0.1 0.0 - 0.2 /100 WBC Final       Lab Results   Component Value Date    GLUCOSE 61 (L) 05/19/2022    BUN 16 05/19/2022    CREATININE 0.77 05/19/2022    EGFRIFNONA 70 12/20/2021    EGFRIFAFRI >60 10/12/2018    BCR 20.8 05/19/2022    K 4.0 05/19/2022    CO2 23.0 05/19/2022    CALCIUM 10.0 05/19/2022    PROTENTOTREF 7.4 12/14/2020    ALBUMIN 4.40 05/19/2022    LABIL2 0.9 12/14/2020    AST 24 05/19/2022    ALT 18 05/19/2022       ASSESSMENT:    · Metastatic breast cancer presenting as 1.1 cm mixed lytic/sclerotic hypermetabolic lesion in the left hemisacrum suspicious for metastatic disease 1.2 cm sclerotic lesion on L4, small L3 lesion and T11 lesion.  Tumor is ER positive, MA positive and HER-2/bishop negative.  Patient was prescribed combination of Ibrance and Arimidex.  She discontinued Ibrance a few months ago due to pulmonary issues.  Patient has not been to the office since March 2022.  She is currently on single agent Arimidex  · Bone metastasis: Liver recommended but patient has not started this treatment  · Pancytopenia secondary to Ibrance: Reviewed her CBC  · Ongoing back pain issues: Reviewed her bone scan . MRI of the lumbar and pelvis area. We will also give patient referral to pain management with Dr. Hunter.  Continue Percocet to 10 mg every 4-6 hours as needed for pain  · ypT2 N1 aM0 status post left modified radical mastectomy with lymph node dissection and right prophylactic mastectomy in 2017 performed at [facility].  ER positive, MA positive and HER-2/bishop negative.   Status post bilateral chest wall reconstruction.  According to patient, she tolerated Arimidex very well except that she also had osteoporosis therefore Arimidex was discontinued at that time  · Intolerance of tamoxifen in the past  · Osteoporosis: She was on Prolia: We will transition to Xgeva since she has bone metastases  · Status post neoadjuvant Arimidex prior to bilateral mastectomies  · Thrombocytopenia: Work-up was - December 2020  · Neck pain status post cervical spine surgery: Resolved  · Complex cardiac medical history including tetralogy of Fallot, coarctation of aorta, VSD status post repair.  Status post stent placement for coarctation of aorta  · Personal history and strong family history of breast cancer in multiple in the relatives on paternal side of the family including 4 paternal aunts in their 30s and 40s and 2 maternal aunts at age of 20s and 50s.  There is concern for possible hereditary breast cancer syndrome.  Patient may be  interested in pursuing cancer genetics for her management.  · Assessment has been reviewed and updated          Discussion    Patient has metastatic breast cancer estrogen and progesterone dependent and HER-2/bishop negative.  She is currently on Arimidex.  We will add Ibrance.  We will also discontinue Prolia and begin Xgeva to help reduce skeletal events.           Plans:     · Will obtain CT scan chest abdomen and pelvis as well as bone scan to restage her malignancy  · We will also obtain a CMP CEA CA 15-3 and CA 27.29 today  · Continue Arimidex for now  · Send her blood for [place] [id number] for NGS testing to see if there is any targeted therapy options that she may have.  Otherwise would consider combination of AI plus Aromasin  · Advised patient to remain compliant with follow-up  · Referred to pulmonary for her dyspnea: Dr. Julio  · Follow-up with pain management for ongoing pain issues  · Referred to pain management with Dr. Hunter, patient encouraged to  follow-up  · Follow-up with /counselor   · Reviewed work-up for thrombocytopenia which was negative  · Reviewed her bone density which showed osteoporosis  · Schedule Xgeva 120 mg subcu monthly at the next visit  · All questions answered  · Follow-up 4 weeks or earlier as needed  · All questions answered            Patient verbalized understanding and is in agreement of the above plan.             I spent 40 total minutes, face-to-face, caring for Gladys today.  90% of this time involved counseling and/or coordination of care as documented within this note.

## 2022-10-07 ENCOUNTER — SPECIALTY PHARMACY (OUTPATIENT)
Dept: PHARMACY | Facility: HOSPITAL | Age: 62
End: 2022-10-07

## 2022-10-07 LAB
CANCER AG15-3 SERPL-ACNC: 120 U/ML
CANCER AG27-29 SERPL-ACNC: 199.2 U/ML (ref 0–38.6)
CEA SERPL-MCNC: 48.6 NG/ML

## 2022-10-07 NOTE — PROGRESS NOTES
Specialty Pharmacy Note    Gladys is no longer followed by Specialty Pharmacy due to her decision to discontinue Ibrance.  If oral treatment is appropriate in the future, please consult oral onc.     Contacted [facility] to inform that medication is stopped.  It had not been filled since 3/14/22.    Thanks,    Eda HIRSCH, PharmD    
age(85 years old or older)

## 2022-10-11 ENCOUNTER — DOCUMENTATION (OUTPATIENT)
Dept: ONCOLOGY | Facility: CLINIC | Age: 62
End: 2022-10-11

## 2022-10-11 NOTE — PROGRESS NOTES
OSW received [facility] paperwork and discussed amount of leave time to ida with Dr. Nunez.     Completed paperwork and sent to Dr. Nunez for signing.     Nai Jane, LSW, CSW, MSW  Oncology MSW  [facility]

## 2022-10-20 LAB — REF LAB TEST METHOD: NORMAL

## 2022-10-24 ENCOUNTER — HOSPITAL ENCOUNTER (OUTPATIENT)
Dept: PAIN MEDICINE | Facility: HOSPITAL | Age: 62
Discharge: HOME | End: 2022-10-24

## 2022-10-24 VITALS
RESPIRATION RATE: 16 BRPM | OXYGEN SATURATION: 95 % | HEIGHT: 61 IN | WEIGHT: 150 LBS | HEART RATE: 70 BPM | SYSTOLIC BLOOD PRESSURE: 157 MMHG | TEMPERATURE: 97.5 F | DIASTOLIC BLOOD PRESSURE: 74 MMHG | BODY MASS INDEX: 28.32 KG/M2

## 2022-10-24 DIAGNOSIS — G89.3 CANCER ASSOCIATED PAIN: Primary | ICD-10-CM

## 2022-10-24 DIAGNOSIS — R52 PAIN: ICD-10-CM

## 2022-10-24 PROCEDURE — 62370 ANL SP INF PMP W/MDREPRG&FIL: CPT | Performed by: STUDENT IN AN ORGANIZED HEALTH CARE EDUCATION/TRAINING PROGRAM

## 2022-10-24 PROCEDURE — 77002 NEEDLE LOCALIZATION BY XRAY: CPT | Performed by: STUDENT IN AN ORGANIZED HEALTH CARE EDUCATION/TRAINING PROGRAM

## 2022-10-24 PROCEDURE — 77003 FLUOROGUIDE FOR SPINE INJECT: CPT

## 2022-10-24 NOTE — PROCEDURES
Intrathecal Pump Refill / Reprogram with Fluoroscopic Guidance:    PREOPERATIVE DIAGNOSIS:  Presence of IDDS system.  Cancer pain.    POSTOPERATIVE DIAGNOSIS:  Same as preop diagnosis    PROCEDURE:   Intrathecal pump Refill / Reprogram requiring MD expertise, and requirement for fluoroscopic guidance.  CPT 79918, 07784    IDDS System:   [employer]tronic Synchromed II      PRE-PROCEDURE DISCUSSION WITH PATIENT:    Risks and complications were discussed with the patient prior to starting the procedure and informed consent was obtained.  We discussed various topics including but not limited to bleeding, infection, injury, nerve injury, paralysis, coma, overdose, reaction to injectate and/or medication, death, postprocedural painful flare-up, postprocedural site soreness, and a lack of pain relief resulting from the procedure or the knowledge gained from the procedure, and a risk of equipment malfunction or damage.        SURGEON:  Chuck Hunter MD    REASON FOR PROCEDURE:     The intrathecal pump could not be filled with a blind percutaneous technique in the office.  After multiple attempts, the procedure in the office was aborted and the patient was scheduled for image-guided refill for patient safety.      SEDATION:  Patient declined administration of moderate sedation      LOCAL ANESTHETICS:  NONE,     DESCRIPTON OF PROCEDURE:  The patient taken to the operating room and was placed in the prone  position.  All pressure points were well padded.     The pump was interrogated.  The pump is currently running a solution of Morphine at a concentration of 1mg/ml and at a dose of 0.1800 on the basal infusion.  In addition, a PTM dose was not programmed.    The site of the pump was identified.  The appropriate area was prepped with Chloraprep and draped in a sterile fashion.    The area overlying the central port was not anesthetized with subcutaneous solution of local anesthetic.  Fluoroscopy was utilized to visualize the  orientation of the pump in its pocket.  The proprietary pump refill kit was used to puncture the skin and enter into the reservoir access port.      Aspiration was attempted and successful.   The Expected Residual Volume (ERV) was 3.5.  The Actual Residual Volume aspirated was 3.0.  This amount was discarded.       The pump was then refilled with Morphine 1mg/ml.  The infusion program was not changed.      Basal Rate: 0.1800    Refill Date: 1/31/22    The needle was removed intact.  Vital signs were stable throughout.        ESTIMATED BLOOD LOSS:  none  SPECIMENS:  none    COMPLICATIONS:   No complications were noted.    TOLERANCE & DISCHARGE CONDITION:    The patient tolerated the procedure well.  Pump site is intact with minimal tenderness, and no erythema nor drainage.  The patient was transported to the recovery area without difficulties.  The patient was discharged to home under the care of family in stable and satisfactory condition.      PLAN OF CARE:  1. The patient was given our standard instruction sheet.  2. The patient will Plan for refill prior to refill date.  3. The patient will resume all medications as per the medication reconciliation sheet.        4.   Patient will return for pump refill when due or sooner if needed.

## 2022-10-25 ENCOUNTER — TELEPHONE (OUTPATIENT)
Dept: PAIN MEDICINE | Facility: HOSPITAL | Age: 62
End: 2022-10-25

## 2022-10-31 ENCOUNTER — HOSPITAL ENCOUNTER (OUTPATIENT)
Dept: NUCLEAR MEDICINE | Facility: HOSPITAL | Age: 62
Discharge: HOME | End: 2022-10-31

## 2022-10-31 ENCOUNTER — HOSPITAL ENCOUNTER (OUTPATIENT)
Dept: CT IMAGING | Facility: HOSPITAL | Age: 62
Discharge: HOME | End: 2022-10-31
Admitting: INTERNAL MEDICINE

## 2022-10-31 DIAGNOSIS — C79.51 SPINE METASTASIS: ICD-10-CM

## 2022-10-31 DIAGNOSIS — M89.8X9 BONE PAIN: ICD-10-CM

## 2022-10-31 DIAGNOSIS — C50.919 MALIGNANT NEOPLASM OF FEMALE BREAST, UNSPECIFIED ESTROGEN RECEPTOR STATUS, UNSPECIFIED LATERALITY, UNSPECIFIED SITE OF BREAST: ICD-10-CM

## 2022-10-31 PROCEDURE — 0 TECHNETIUM MEDRONATE KIT: Performed by: INTERNAL MEDICINE

## 2022-10-31 PROCEDURE — 74176 CT ABD & PELVIS W/O CONTRAST: CPT

## 2022-10-31 PROCEDURE — A9503 TC99M MEDRONATE: HCPCS | Performed by: INTERNAL MEDICINE

## 2022-10-31 PROCEDURE — 78306 BONE IMAGING WHOLE BODY: CPT

## 2022-10-31 PROCEDURE — 71250 CT THORAX DX C-: CPT

## 2022-10-31 RX ORDER — TC 99M MEDRONATE 20 MG/10ML
23.5 INJECTION, POWDER, LYOPHILIZED, FOR SOLUTION INTRAVENOUS
Status: COMPLETED | OUTPATIENT
Start: 2022-10-31 | End: 2022-10-31

## 2022-10-31 RX ADMIN — TC 99M MEDRONATE 23.5 MILLICURIE: 20 INJECTION, POWDER, LYOPHILIZED, FOR SOLUTION INTRAVENOUS at 11:18

## 2022-11-02 ENCOUNTER — TELEPHONE (OUTPATIENT)
Dept: ONCOLOGY | Facility: CLINIC | Age: 62
End: 2022-11-02

## 2022-11-02 NOTE — TELEPHONE ENCOUNTER
Caller: Gladys Garrison    Relationship: Self    Best call back number: [phone]    What is the best time to reach you: ANYTIME    Who are you requesting to speak with (clinical staff, provider,  specific staff member): SCHEDULING    What was the call regarding: PT NEEDS TO R/S HER 11/4 APPT - WOULD LIKE TO R/S FOR Monday IF POSSIBLE.    PLEASE CALL TO ADVISE.     Do you require a callback: YES

## 2022-11-04 ENCOUNTER — APPOINTMENT (OUTPATIENT)
Dept: LAB | Facility: HOSPITAL | Age: 62
End: 2022-11-04

## 2022-11-08 ENCOUNTER — APPOINTMENT (OUTPATIENT)
Dept: LAB | Facility: HOSPITAL | Age: 62
End: 2022-11-08

## 2022-11-09 ENCOUNTER — OFFICE VISIT (OUTPATIENT)
Dept: FAMILY MEDICINE CLINIC | Facility: CLINIC | Age: 62
End: 2022-11-09

## 2022-11-09 VITALS
BODY MASS INDEX: 29.23 KG/M2 | OXYGEN SATURATION: 96 % | WEIGHT: 154.8 LBS | DIASTOLIC BLOOD PRESSURE: 68 MMHG | HEIGHT: 61 IN | HEART RATE: 72 BPM | RESPIRATION RATE: 18 BRPM | SYSTOLIC BLOOD PRESSURE: 128 MMHG

## 2022-11-09 DIAGNOSIS — R05.1 ACUTE COUGH: Primary | ICD-10-CM

## 2022-11-09 PROCEDURE — 99213 OFFICE O/P EST LOW 20 MIN: CPT | Performed by: STUDENT IN AN ORGANIZED HEALTH CARE EDUCATION/TRAINING PROGRAM

## 2022-11-09 RX ORDER — AZITHROMYCIN 250 MG/1
TABLET, FILM COATED ORAL
Qty: 6 TABLET | Refills: 0 | Status: ON HOLD | OUTPATIENT
Start: 2022-11-09 | End: 2023-03-07

## 2022-11-09 NOTE — PROGRESS NOTES
"Chief Complaint  Chief Complaint   Patient presents with   • Cough     Pt states she is Coughing up dark green stuff/blood, gives her head ache     Subjective        Gladys Garrison is a 62 y.o. female who presents to [facility].    History of Present Illness  Cough: dark green with occasional blood.  Has been coughing for a couple weeks but Sunday seemed to worsen.  50% of the time it has had blood for the last couple days.  No fevers.  She took a home covid test that was negative.      Had CT chest done 10/31 (not related to current illness) that was stable.    Objective   /68   Pulse 72   Resp 18   Ht 154.9 cm (61\")   Wt 70.2 kg (154 lb 12.8 oz)   SpO2 96%   BMI 29.25 kg/m²     Estimated body mass index is 29.25 kg/m² as calculated from the following:    Height as of this encounter: 154.9 cm (61\").    Weight as of this encounter: 70.2 kg (154 lb 12.8 oz).     Physical Exam   GEN: In no acute distress, fatigued appearing  HEENT: Pupils equal and reactive to light, sclera clear  CV: Regular rate and rhythm, no murmurs  RESP: Lungs clear to auscultation anteriorly and posteriorly in all lung fields bilaterally.     Result Review :              Assessment and Plan     Diagnoses and all orders for this visit:    1. Acute cough (Primary)  Overall reassuring exam, she is clinically stable.  Given her cancer diagnosis I would like to prevent this from turning into something more serious so will go ahead and treat w/ Z pack.    Blood likely d/t irritation / inflammation.  Recent CT scan w/o new lung findings which is reassuring.    -     azithromycin (Zithromax Z-Marcelino) 250 MG tablet; Take 2 tablets by mouth on day 1, then 1 tablet daily on days 2-5  Dispense: 6 tablet; Refill: 0     "

## 2022-11-14 ENCOUNTER — LAB (OUTPATIENT)
Dept: LAB | Facility: HOSPITAL | Age: 62
End: 2022-11-14

## 2022-11-14 ENCOUNTER — OFFICE VISIT (OUTPATIENT)
Dept: ONCOLOGY | Facility: CLINIC | Age: 62
End: 2022-11-14

## 2022-11-14 VITALS
HEIGHT: 61 IN | BODY MASS INDEX: 29.07 KG/M2 | HEART RATE: 85 BPM | RESPIRATION RATE: 18 BRPM | SYSTOLIC BLOOD PRESSURE: 141 MMHG | DIASTOLIC BLOOD PRESSURE: 82 MMHG | WEIGHT: 154 LBS | TEMPERATURE: 96.6 F

## 2022-11-14 DIAGNOSIS — C50.919 MALIGNANT NEOPLASM OF FEMALE BREAST, UNSPECIFIED ESTROGEN RECEPTOR STATUS, UNSPECIFIED LATERALITY, UNSPECIFIED SITE OF BREAST: Primary | ICD-10-CM

## 2022-11-14 DIAGNOSIS — C79.51 BONE METASTASIS: ICD-10-CM

## 2022-11-14 LAB
BASOPHILS # BLD AUTO: 0.02 10*3/MM3 (ref 0–0.2)
BASOPHILS NFR BLD AUTO: 0.4 % (ref 0–1.5)
DEPRECATED RDW RBC AUTO: 41.3 FL (ref 37–54)
EOSINOPHIL # BLD AUTO: 0.07 10*3/MM3 (ref 0–0.4)
EOSINOPHIL NFR BLD AUTO: 1.3 % (ref 0.3–6.2)
ERYTHROCYTE [DISTWIDTH] IN BLOOD BY AUTOMATED COUNT: 13.1 % (ref 12.3–15.4)
HCT VFR BLD AUTO: 42.2 % (ref 34–46.6)
HGB BLD-MCNC: 13.5 G/DL (ref 12–15.9)
HOLD SPECIMEN: NORMAL
LYMPHOCYTES # BLD AUTO: 1.25 10*3/MM3 (ref 0.7–3.1)
LYMPHOCYTES NFR BLD AUTO: 23.1 % (ref 19.6–45.3)
MCH RBC QN AUTO: 28.4 PG (ref 26.6–33)
MCHC RBC AUTO-ENTMCNC: 32 G/DL (ref 31.5–35.7)
MCV RBC AUTO: 88.7 FL (ref 79–97)
MONOCYTES # BLD AUTO: 0.43 10*3/MM3 (ref 0.1–0.9)
MONOCYTES NFR BLD AUTO: 8 % (ref 5–12)
NEUTROPHILS NFR BLD AUTO: 3.63 10*3/MM3 (ref 1.7–7)
NEUTROPHILS NFR BLD AUTO: 67.2 % (ref 42.7–76)
PLATELET # BLD AUTO: 118 10*3/MM3 (ref 140–450)
PMV BLD AUTO: 9.8 FL (ref 6–12)
RBC # BLD AUTO: 4.76 10*6/MM3 (ref 3.77–5.28)
WBC NRBC COR # BLD: 5.4 10*3/MM3 (ref 3.4–10.8)

## 2022-11-14 PROCEDURE — 36415 COLL VENOUS BLD VENIPUNCTURE: CPT

## 2022-11-14 PROCEDURE — 85025 COMPLETE CBC W/AUTO DIFF WBC: CPT

## 2022-11-14 PROCEDURE — 99215 OFFICE O/P EST HI 40 MIN: CPT | Performed by: INTERNAL MEDICINE

## 2022-11-14 RX ORDER — LAMOTRIGINE 25 MG/1
500 TABLET ORAL ONCE
Status: CANCELLED | OUTPATIENT
Start: 2023-01-06

## 2022-11-14 RX ORDER — LAMOTRIGINE 25 MG/1
500 TABLET ORAL ONCE
Status: CANCELLED | OUTPATIENT
Start: 2023-01-20

## 2022-11-14 NOTE — PROGRESS NOTES
Hematology/Oncology Outpatient Follow Up    PATIENT NAME:Gladys Garrison  :1960  MRN: [id number]  PRIMARY CARE PHYSICIAN: Chirag Robles DO  REFERRING PHYSICIAN: Chirag Robles, *    Chief Complaint   Patient presents with   • Follow-up     Malignant neoplasm of female breast, unspecified estrogen receptor status, unspecified laterality, unspecified site of breast (HCC)        HISTORY OF PRESENT ILLNESS:     This is a 60-year-old female who multiple comorbidities including congenital abnormalities such as hypoplastic kidney, tetralogy of Fallot, coarctation of the aorta and congenital VSD status post repair.  Patient developed syncopal episode and for that reason she had she had a CT scan of the chest which showed mass in the left breast.  She had diagnostic mammogram and ultrasound which showed a 2 cm spiculated mass in the left breast at the 1 o'clock position 6 cm from the nipple.  Biopsy of the left breast mass revealed invasive moderately differentiated carcinoma ER/CT positive and HER-2/bishop negative.  Patient also had an ultrasound of the axilla which was concerning for an abnormal left axillary lymph node with cortical thickening. She apparently had an FNA of the left axillary nodule which was positive for malignancy.  On 2017 patient underwent left modified radical mastectomy, right prophylactic mastectomy and immediate breast reconstruction. She also underwent right prophylactic mastectomy.  We have had her on records suggest that patient did have multifocal disease with pT2 pN1 aM0.  The largest focus measured 3.5 cm.  2 of 11 lymph nodes were positive for metastatic disease some with extracapsular extension.  Notes that patient did receive Arimidex neoadjuvant  from 2017 to 2018 prior to her bilateral mastectomies.    Review of her note suggest that she developed cough which she attributed to anastrozole and patient was then placed on tamoxifen.  She had  MammaPrint testing which returned with low risk for relapse.    Her postop course was also complicated by left chest wall abscess which resulted in I&D and removal of the left tissue expander. She was referred for radiation treatment boards ultimately declined.    Patient was then placed on tamoxifen in April 2018 which she stopped after less than a month due to nausea and vomiting.  Patient in the interim also was diagnosed with chronic hepatitis C was seen by the hepatologist.  Tamoxifen was dose reduced to 10 mg daily with the goal to increase to 20 mg daily.      Patient has relocated to [facility].  She has transitioned her care to us now.  She is currently not on any antiestrogen therapy.     Her bilateral mastectomy specimen are available for review    · 1/29/2021 patient had bone density which showed osteoporosis  · Patient was seen by neurosurgery and had a bone scan done in March 2021 which showed subtle activity in the inferior L4 vertebral body appears to correlate with new area of sclerosis on CT of the abdomen and pelvis.  There is also subtle activity with possible new lesion at the posterior left sacrum.  These findings were concerning for metastatic disease.  PET CT scan was recommended to further evaluate.  · 4/8/2021 patient had a PET CT scan which showed evidence of disease in the neck chest abdomen and pelvis.  But she has a 1.1 cm mixed lytic/sclerotic hypermetabolic lesion within the left hemisacrum consistent with metastatic disease.  There is also accumulation within the inferior endplate of the L4 vertebral body thought to correspond to 1.2 centimeters sclerotic lesion consistent with metastatic disease.  There is a tiny hypermetabolic focus within the L3, T11.  Suspicious for also early metastatic disease.  · 4/26/2021 patient underwent CT-guided biopsy of sacral lesion, pathology was consistent with metastatic breast cancer ER and OH positive HER-2/bishop was negative.  · 7/6/21 CT new  sclerosis within the right 12th rib posteriorly which could represent metastatic lesion or a healed or healing fracture, new sclerosis within the right 12th rib posteriorly which         could represent metastatic lesion,new sclerosis within the right T8 transverse process worrisome for metastatic        disease progression.  · 7/7/21 complaints of 8/10 back pain and 8/10 LUQ pain. Referral to radiation for questionable mets on CT chest.  · 7/26/2021 patient had CT scan of the head with contrast with did not show any evidence of metastatic disease.  CT scan of the chest abdomen and pelvis did not show any evidence of progressive disease.  · 7/26/2021 patient had CEA level which shows declining values CA 15-3 has decreased to 62 from 84  · 11/3/2021: Patient had CT scan of the chest, abdomen and pelvis. In the chest there were no suspicious pulmonary nodules. There is no clear evidence of progressive disease  · 12/13/2021 patient had a bone scan which showed uptake along the posterior right ribs consistent with rib fractures.  There is mild uptake in the L4 vertebral body corresponding to the sclerotic lesion seen on previous CT scan.  This was likely due to metastatic disease  · 10/6/2022: Patient has been lost to follow-up.  She stopped Ibrance due to pulmonary issues.  Apparently patient went to the emergency room and was told that Ibrance was causing lung damage.  · October 6, 2022: She has a increasing CA 15-3 and CA 27.29 as well as CEA level.  · 10/19/2022: Patient had guardant 360 testing done which was negative  · 10/31/2022: Patient had bone scan which basically showed evidence for mild progression of multifocal osseous metastatic disease.  There appears to be new activity corresponding to the thoracic cervical spine, left scapula, left sacrum and left proximal femur.  CT scan of the chest otherwise is stable.  There is a nonobstructing stone on the left kidney.          Past Medical History:   Diagnosis  Date   • Allergic    • Bone cancer (HCC)     METASTATIC BONE   • Bradycardia     SECONDARY TO ABLATION   • Breast cancer (HCC) 2017    mets to lymph nodes; did not do radiation   • Cancer of unknown origin (HCC)    • Compression fx, thoracic spine, open, initial encounter (HCC)    • Coronary artery disease    • COVID-19 2021   • Diabetes mellitus (HCC)    • Heart disease, unspecified    • Hepatitis C     RESOLVED WITH MEDICATION   • Hyperlipidemia    • Hypertension    • Obesity (BMI 30-39.9) 2021   • Rib fracture     Per patient   • Sleep apnea     no machine   • Tachycardia     ATRIAL   • Type 2 diabetes mellitus (HCC) 2017       Past Surgical History:   Procedure Laterality Date   • BACK SURGERY      neck X 2   • BREAST RECONSTRUCTION Bilateral    • CARDIAC ABLATION       atrial tachycardia x 5 ablations    • CARDIAC CATHETERIZATION     • CARDIAC SURGERY      stent placed in aorta   • CARDIAC SURGERY      6 surgeries as baby    • CERVICAL FUSION ANTERIOR WITH ARTIFICIAL DISCECTOMY IMPLANTATION N/A 2020    Procedure: C4 VERTEBRECTOMY AND ANTERIOR CERIVCAL DISCECTOMY WITH FUSION OF CERVICAL THREE THROUGH FIVE WITH REMOVAL OF HARDWARE C5-C6;  Surgeon: Mark Kaba MD;  Location: [facility] MAIN OR;  Service: Neurosurgery;  Laterality: N/A;   •  SECTION      x2   • COLONOSCOPY N/A 10/23/2020    Procedure: COLONOSCOPY WITH POLYPECTOMY X6;  Surgeon: Stanley Bourne MD;  Location: [facility] ENDOSCOPY;  Service: Gastroenterology;  Laterality: N/A;  POLYPS, INTERNAL HEMORRHOIDS   • ENDOMETRIAL ABLATION      REMOVAL SCAR TISSUE UTERINE   • ENDOSCOPY N/A 10/23/2020    Procedure: ESOPHAGOGASTRODUODENOSCOPY WITH BIOPSY X 1 AREA;  Surgeon: Stanley Bourne MD;  Location: [facility] ENDOSCOPY;  Service: Gastroenterology;  Laterality: N/A;  GASTRITIS, ESOPHAGITIS, HIATAL HERNIA   • KNEE SURGERY     • MASTECTOMY Bilateral    • NECK SURGERY     • PACEMAKER IMPLANTATION     • PAIN  PUMP INSERTION/REVISION N/A 4/5/2022    Procedure: PAIN PUMP INSERTION AND INTRATHECAL CATHETER PLACEMENT;  Surgeon: Chuck Hunter MD;  Location: [facility] MAIN OR;  Service: Pain Management;  Laterality: N/A;         Current Outpatient Medications:   •  acetaminophen (TYLENOL) 650 MG 8 hr tablet, Take 650 mg by mouth As Needed for Mild Pain . TAKES BETWEEN PAIN MEDS, Disp: , Rfl:   •  aspirin 81 MG chewable tablet, Chew 1 tablet Daily., Disp: 30 tablet, Rfl: 1  •  azithromycin (Zithromax Z-Marcelino) 250 MG tablet, Take 2 tablets by mouth on day 1, then 1 tablet daily on days 2-5, Disp: 6 tablet, Rfl: 0  •  Blood Glucose Monitoring Suppl (Accu-Chek Araceli) device, Use as instructed   To test blood sugar bid, Disp: 1 each, Rfl: 0  •  Calcium Carbonate-Vit D-Min (Calcium 600+D Plus Minerals) 600-400 MG-UNIT chewable tablet, Chew 600 mg 2 (Two) Times a Day. (Patient taking differently: Chew 600 mg 2 (Two) Times a Day. GUMMIES), Disp: 60 each, Rfl: 6  •  glucose blood (Accu-Chek Araceli Plus) test strip, Use as instructed   To test bid, Disp: 200 each, Rfl: 3  •  ibuprofen (ADVIL,MOTRIN) 400 MG tablet, Take 400 mg by mouth As Needed for Mild Pain . IN BETWEEN PAIN  MEDS, Disp: , Rfl:   •  insulin NPH-insulin regular (humuLIN 70/30,novoLIN 70/30) (70-30) 100 UNIT/ML injection, Inject 40 Units under the skin into the appropriate area as directed Every Morning., Disp: , Rfl:   •  insulin NPH-insulin regular (humuLIN 70/30,novoLIN 70/30) (70-30) 100 UNIT/ML injection, Inject 30 Units under the skin into the appropriate area as directed Every Evening., Disp: , Rfl:   •  Insulin Pen Needle (B-D UF III MINI PEN NEEDLES) 31G X 5 MM misc, Use to inject insulin twice daily   DX: E11.9, Disp: 100 each, Rfl: 0  •  Lancets (accu-chek soft touch) lancets, Test bid, Disp: 200 each, Rfl: 3  •  lisinopril (PRINIVIL,ZESTRIL) 20 MG tablet, Take 20 mg by mouth Daily., Disp: , Rfl:   •  rosuvastatin (Crestor) 20 MG tablet, Take 1 tablet by mouth  Every Night., Disp: 90 tablet, Rfl: 0    Allergies   Allergen Reactions   • Promethazine Other (See Comments)     Hyperactive mean   • Tape Other (See Comments)     .blisters         Family History   Problem Relation Age of Onset   • Heart disease Mother    • Stroke Mother    • Lung cancer Mother    • Aneurysm Father    • Diabetes Sister    • No Known Problems Brother    • No Known Problems Brother    • Diabetes type I Half-Sister    • Thyroid cancer Half-Sister    • Cancer Maternal Aunt    • Heart attack Sister    • Thyroid disease Sister    • No Known Problems Sister        Cancer-related family history includes Cancer in her maternal aunt; Lung cancer in her mother; Thyroid cancer in her half-sister.    Social History     Tobacco Use   • Smoking status: Never     Passive exposure: Never   • Smokeless tobacco: Never   Vaping Use   • Vaping Use: Never used   Substance Use Topics   • Alcohol use: Yes     Alcohol/week: 1.0 standard drink     Types: 1 Glasses of wine per week     Comment: rare   • Drug use: Not Currently       I have reviewed and confirmed the accuracy of the patient's history: Chief complaint, HPI, ROS and Subjective as entered by the MA/LPN/RN. Мария Lian Nunez MD 11/14/22       SUBJECTIVE:    She is here for follow-up visit after a prolonged absence as she was last seen by me March 2022          REVIEW OF SYSTEMS:    Review of Systems   Constitutional: Negative for chills and fever.   HENT: Negative for ear pain, mouth sores, nosebleeds and sore throat.    Eyes: Negative for photophobia and visual disturbance.   Respiratory: Negative for wheezing and stridor.    Cardiovascular: Negative for chest pain and palpitations.   Gastrointestinal: Negative for abdominal pain, diarrhea, nausea and vomiting.   Endocrine: Negative for cold intolerance and heat intolerance.   Genitourinary: Negative for dysuria and hematuria.   Musculoskeletal: Negative for joint swelling and neck stiffness.   Skin:  "Negative for color change and rash.   Neurological: Negative for seizures and syncope.   Hematological: Negative for adenopathy.        No obvious bleeding   Psychiatric/Behavioral: Negative for agitation, confusion and hallucinations.     OBJECTIVE:    Vitals:    11/14/22 1050   BP: 141/82   Pulse: 85   Resp: 18   Temp: 96.6 °F (35.9 °C)   TempSrc: Infrared   Weight: 69.9 kg (154 lb)   Height: 154.9 cm (61\")   PainSc:   8     Body mass index is 29.1 kg/m².    ECOG    (1) Restricted in physically strenuous activity, ambulatory and able to do work of light nature    Physical Exam  Vitals and nursing note reviewed.   Constitutional:       General: She is not in acute distress.     Appearance: Normal appearance. She is not diaphoretic.   HENT:      Head: Normocephalic and atraumatic.      Mouth/Throat:      Mouth: Mucous membranes are moist.      Pharynx: Oropharynx is clear.   Eyes:      General: No scleral icterus.        Right eye: No discharge.         Left eye: No discharge.      Extraocular Movements: Extraocular movements intact.      Conjunctiva/sclera: Conjunctivae normal.   Neck:      Thyroid: No thyromegaly.   Cardiovascular:      Rate and Rhythm: Normal rate and regular rhythm.      Pulses: Normal pulses.      Heart sounds: Normal heart sounds.     No friction rub. No gallop.   Pulmonary:      Effort: Pulmonary effort is normal. No respiratory distress.      Breath sounds: Normal breath sounds. No stridor. No wheezing.   Abdominal:      General: Bowel sounds are normal. There is distension.      Palpations: Abdomen is soft. There is no mass.      Tenderness: There is abdominal tenderness. There is guarding (Left upper abdomen). There is no rebound.   Musculoskeletal:         General: No tenderness. Normal range of motion.      Cervical back: Normal range of motion and neck supple.      Right lower leg: No edema.      Left lower leg: No edema.      Comments: Neck pain.  Patient has a neck collar. "   Lymphadenopathy:      Cervical: No cervical adenopathy.   Skin:     General: Skin is warm and dry.      Capillary Refill: Capillary refill takes less than 2 seconds.      Findings: No bruising, erythema or rash.   Neurological:      Mental Status: She is alert and oriented to person, place, and time.      Cranial Nerves: No cranial nerve deficit.      Sensory: No sensory deficit.      Motor: No abnormal muscle tone.   Psychiatric:         Mood and Affect: Mood normal.         Behavior: Behavior normal.         Thought Content: Thought content normal.         Judgment: Judgment normal.       I have reexamined the patient and the results are consistent with the previously documented exam. Мария Nunez MD     RECENT LABS    WBC   Date Value Ref Range Status   11/14/2022 5.40 3.40 - 10.80 10*3/mm3 Final     RBC   Date Value Ref Range Status   11/14/2022 4.76 3.77 - 5.28 10*6/mm3 Final     Hemoglobin   Date Value Ref Range Status   11/14/2022 13.5 12.0 - 15.9 g/dL Final     Hematocrit   Date Value Ref Range Status   11/14/2022 42.2 34.0 - 46.6 % Final     MCV   Date Value Ref Range Status   11/14/2022 88.7 79.0 - 97.0 fL Final     MCH   Date Value Ref Range Status   11/14/2022 28.4 26.6 - 33.0 pg Final     MCHC   Date Value Ref Range Status   11/14/2022 32.0 31.5 - 35.7 g/dL Final     RDW   Date Value Ref Range Status   11/14/2022 13.1 12.3 - 15.4 % Final     RDW-SD   Date Value Ref Range Status   11/14/2022 41.3 37.0 - 54.0 fl Final     MPV   Date Value Ref Range Status   11/14/2022 9.8 6.0 - 12.0 fL Final     Platelets   Date Value Ref Range Status   11/14/2022 118 (L) 140 - 450 10*3/mm3 Final     Neutrophil %   Date Value Ref Range Status   11/14/2022 67.2 42.7 - 76.0 % Final     Lymphocyte %   Date Value Ref Range Status   11/14/2022 23.1 19.6 - 45.3 % Final     Monocyte %   Date Value Ref Range Status   11/14/2022 8.0 5.0 - 12.0 % Final     Eosinophil %   Date Value Ref Range Status   11/14/2022 1.3 0.3  - 6.2 % Final     Basophil %   Date Value Ref Range Status   11/14/2022 0.4 0.0 - 1.5 % Final     Immature Grans %   Date Value Ref Range Status   07/20/2020 0.7 (H) 0.0 - 0.5 % Final     Neutrophils, Absolute   Date Value Ref Range Status   11/14/2022 3.63 1.70 - 7.00 10*3/mm3 Final     Lymphocytes, Absolute   Date Value Ref Range Status   11/14/2022 1.25 0.70 - 3.10 10*3/mm3 Final     Monocytes, Absolute   Date Value Ref Range Status   11/14/2022 0.43 0.10 - 0.90 10*3/mm3 Final     Eosinophils, Absolute   Date Value Ref Range Status   11/14/2022 0.07 0.00 - 0.40 10*3/mm3 Final     Basophils, Absolute   Date Value Ref Range Status   11/14/2022 0.02 0.00 - 0.20 10*3/mm3 Final     Immature Grans, Absolute   Date Value Ref Range Status   07/20/2020 0.05 0.00 - 0.05 10*3/mm3 Final     nRBC   Date Value Ref Range Status   05/19/2022 0.1 0.0 - 0.2 /100 WBC Final       Lab Results   Component Value Date    GLUCOSE 212 (H) 10/06/2022    BUN 10 10/06/2022    CREATININE 0.70 10/06/2022    EGFRIFNONA 70 12/20/2021    EGFRIFAFRI >60 10/12/2018    BCR 14.3 10/06/2022    K 4.3 10/06/2022    CO2 27.0 10/06/2022    CALCIUM 9.4 10/06/2022    PROTENTOTREF 7.4 12/14/2020    ALBUMIN 4.20 10/06/2022    LABIL2 0.9 12/14/2020    AST 19 10/06/2022    ALT 13 10/06/2022       ASSESSMENT:    · Metastatic breast cancer presenting as 1.1 cm mixed lytic/sclerotic hypermetabolic lesion in the left hemisacrum suspicious for metastatic disease 1.2 cm sclerotic lesion on L4, small L3 lesion and T11 lesion.  Tumor is ER positive, [place] positive and HER-2/bishop negative.  Patient was prescribed combination of Ibrance and Arimidex.  She discontinued Ibrance a few months ago due to pulmonary issues.  Patient has not been to the office since March 2022.  She is currently on single agent Arimidex but due to evidence of collateral progression, will discontinue Arimidex and switched to Faslodex.  We will see if this works if it does not work then would add a  different CDK 4 inhibitor.  Reviewed with patient.  Discussed the benefits and side effects of Faslodex with her  · Bone metastasis: Xgeva recommended but patient has not started this treatment  · Pancytopenia secondary to Ibrance: Improved off Ibrance  · Ongoing back pain issues: Reviewed her bone scan . MRI of the lumbar and pelvis area. We will also give patient referral to pain management with Dr. Hunter.  Continue Percocet to 10 mg every 4-6 hours as needed for pain  · ypT2 N1 aM0 status post left modified radical mastectomy with lymph node dissection and right prophylactic mastectomy in 2017 performed at [facility].  ER positive, OR positive and HER-2/bishop negative.  Status post bilateral chest wall reconstruction.  According to patient, she tolerated Arimidex very well except that she also had osteoporosis therefore Arimidex was discontinued at that time  · Intolerance of tamoxifen in the past  · Osteoporosis: She was on Prolia: We will transition to Xgeva since she has bone metastases  · Status post neoadjuvant Arimidex prior to bilateral mastectomies  · Thrombocytopenia: Work-up was - December 2020  · Neck pain status post cervical spine surgery: Resolved  · Complex cardiac medical history including tetralogy of Fallot, coarctation of aorta, VSD status post repair.  Status post stent placement for coarctation of aorta  · Personal history and strong family history of breast cancer in multiple in the relatives on paternal side of the family including 4 paternal aunts in their 30s and 40s and 2 maternal aunts at age of 20s and 50s.  There is concern for possible hereditary breast cancer syndrome.  Patient may be  interested in pursuing cancer genetics for her management.  · Assessment has been reviewed and updated          Discussion    Patient has metastatic breast cancer estrogen and progesterone dependent and HER-2/bishop negative.  She is currently on Arimidex.  We will add Ibrance.  We  will also discontinue Prolia and begin Xgeva to help reduce skeletal events.           Plans:     · Reviewed CT scan chest abdomen and pelvis, no visceral metastasis but she has mild progression of her bone metastasis  · She has rising tumor markers for CEA CA 15-3 and CA 27.29   · Guardant 360 for NGS testing was negative for any actionable mutations  · Discontinue Arimidex and begin Faslodex early next week.  · Guardian 360 does not show any actionable mutation.  Would consider soft tissue biopsy at time of progression for additional NGS testing.  Reviewed with patient  · Advised patient to remain compliant with follow-up  · Referred to pulmonary for her dyspnea: Dr. Julio.  Appointment is pending  · Follow-up with pain management for ongoing pain issues  · Referred to pain management with Dr. Hunter, patient encouraged to follow-up  · Follow-up with /counselor   · Reviewed work-up for thrombocytopenia which was negative  · Reviewed her bone density which showed osteoporosis  · Schedule Xgeva 120 mg subcu monthly once her dental procedures are completed  · All questions answered  · Follow-up in 6 weeks  · All questions answered            Patient verbalized understanding and is in agreement of the above plan.                 I spent 40 total minutes, face-to-face, caring for Gladys today.  90% of this time involved counseling and/or coordination of care as documented within this note.

## 2022-11-17 ENCOUNTER — TELEPHONE (OUTPATIENT)
Dept: FAMILY MEDICINE CLINIC | Facility: CLINIC | Age: 62
End: 2022-11-17

## 2022-11-17 DIAGNOSIS — E11.9 CONTROLLED TYPE 2 DIABETES MELLITUS WITHOUT COMPLICATION, WITH LONG-TERM CURRENT USE OF INSULIN: ICD-10-CM

## 2022-11-17 DIAGNOSIS — Z79.4 CONTROLLED TYPE 2 DIABETES MELLITUS WITHOUT COMPLICATION, WITH LONG-TERM CURRENT USE OF INSULIN: ICD-10-CM

## 2022-11-17 NOTE — TELEPHONE ENCOUNTER
Caller: Gladys Garrison    Relationship: Self    Best call back number: [phone]    Requested Prescriptions:     INSULIN NEEDLES     Pharmacy where request should be sent: [facility] [address] [place] [address] [phone] [facility] [phone]      Additional details provided by patient:     Does the patient have less than a 3 day supply:  [x] Yes  [] No    William Cloud Rep   11/17/22 13:53 EST

## 2022-11-18 DIAGNOSIS — E11.9 CONTROLLED TYPE 2 DIABETES MELLITUS WITHOUT COMPLICATION, WITH LONG-TERM CURRENT USE OF INSULIN: Primary | ICD-10-CM

## 2022-11-18 DIAGNOSIS — Z79.4 CONTROLLED TYPE 2 DIABETES MELLITUS WITHOUT COMPLICATION, WITH LONG-TERM CURRENT USE OF INSULIN: Primary | ICD-10-CM

## 2022-11-18 RX ORDER — SYRINGE-NEEDLE,INSULIN,0.5 ML 27GX1/2"
1 SYRINGE, EMPTY DISPOSABLE MISCELLANEOUS 2 TIMES DAILY
Qty: 100 EACH | Refills: 6 | Status: SHIPPED | OUTPATIENT
Start: 2022-11-18

## 2023-01-05 NOTE — PROGRESS NOTES
Hematology/Oncology Outpatient Follow Up    PATIENT NAME:Gladys Garrison  :1960  MRN: [id number]  PRIMARY CARE PHYSICIAN: Chirag Robles DO  REFERRING PHYSICIAN: Chirag Robles, *    Chief Complaint   Patient presents with   • Follow-up     Malignant neoplasm of female breast, unspecified estrogen receptor status, unspecified laterality, unspecified site of breast (HCC)        HISTORY OF PRESENT ILLNESS:     This is a 62-year-old female who multiple comorbidities including congenital abnormalities such as hypoplastic kidney, tetralogy of Fallot, coarctation of the aorta and congenital VSD status post repair.  Patient developed syncopal episode and for that reason she had she had a CT scan of the chest which showed mass in the left breast.  She had diagnostic mammogram and ultrasound which showed a 2 cm spiculated mass in the left breast at the 1 o'clock position 6 cm from the nipple.  Biopsy of the left breast mass revealed invasive moderately differentiated carcinoma ER/MS positive and HER-2/bishop negative.  Patient also had an ultrasound of the axilla which was concerning for an abnormal left axillary lymph node with cortical thickening. She apparently had an FNA of the left axillary nodule which was positive for malignancy.  On 2017 patient underwent left modified radical mastectomy, right prophylactic mastectomy and immediate breast reconstruction. She also underwent right prophylactic mastectomy.  We have had her on records suggest that patient did have multifocal disease with pT2 pN1 aM0.  The largest focus measured 3.5 cm.  2 of 11 lymph nodes were positive for metastatic disease some with extracapsular extension.  Notes that patient did receive Arimidex neoadjuvant  from 2017 to 2018 prior to her bilateral mastectomies.    Review of her note suggest that she developed cough which she attributed to anastrozole and patient was then placed on tamoxifen.  She had  MammaPrint testing which returned with low risk for relapse.    Her postop course was also complicated by left chest wall abscess which resulted in I&D and removal of the left tissue expander. She was referred for radiation treatment boards ultimately declined.    Patient was then placed on tamoxifen in April 2018 which she stopped after less than a month due to nausea and vomiting.  Patient in the interim also was diagnosed with chronic hepatitis C was seen by the hepatologist.  Tamoxifen was dose reduced to 10 mg daily with the goal to increase to 20 mg daily.      Patient has relocated to [facility].  She has transitioned her care to us now.  She is currently not on any antiestrogen therapy.     Her bilateral mastectomy specimen are available for review    · 1/29/2021 patient had bone density which showed osteoporosis  · Patient was seen by neurosurgery and had a bone scan done in March 2021 which showed subtle activity in the inferior L4 vertebral body appears to correlate with new area of sclerosis on CT of the abdomen and pelvis.  There is also subtle activity with possible new lesion at the posterior left sacrum.  These findings were concerning for metastatic disease.  PET CT scan was recommended to further evaluate.  · 4/8/2021 patient had a PET CT scan which showed evidence of disease in the neck chest abdomen and pelvis.  But she has a 1.1 cm mixed lytic/sclerotic hypermetabolic lesion within the left hemisacrum consistent with metastatic disease.  There is also accumulation within the inferior endplate of the L4 vertebral body thought to correspond to 1.2 centimeters sclerotic lesion consistent with metastatic disease.  There is a tiny hypermetabolic focus within the L3, T11.  Suspicious for also early metastatic disease.  · 4/26/2021 patient underwent CT-guided biopsy of sacral lesion, pathology was consistent with metastatic breast cancer ER and RI positive HER-2/bishop was negative.  · 7/6/21 CT new  sclerosis within the right 12th rib posteriorly which could represent metastatic lesion or a healed or healing fracture, new sclerosis within the right 12th rib posteriorly which         could represent metastatic lesion,new sclerosis within the right T8 transverse process worrisome for metastatic        disease progression.  · 7/7/21 complaints of 8/10 back pain and 8/10 LUQ pain. Referral to radiation for questionable mets on CT chest.  · 7/26/2021 patient had CT scan of the head with contrast with did not show any evidence of metastatic disease.  CT scan of the chest abdomen and pelvis did not show any evidence of progressive disease.  · 7/26/2021 patient had CEA level which shows declining values CA 15-3 has decreased to 62 from 84  · 11/3/2021: Patient had CT scan of the chest, abdomen and pelvis. In the chest there were no suspicious pulmonary nodules. There is no clear evidence of progressive disease  · 12/13/2021 patient had a bone scan which showed uptake along the posterior right ribs consistent with rib fractures.  There is mild uptake in the L4 vertebral body corresponding to the sclerotic lesion seen on previous CT scan.  This was likely due to metastatic disease  · 10/6/2022: Patient has been lost to follow-up.  She stopped Ibrance due to pulmonary issues.  Apparently patient went to the emergency room and was told that Ibrance was causing lung damage.  · October 6, 2022: She has a increasing CA 15-3 and CA 27.29 as well as CEA level.  · 10/19/2022: Patient had guardant 360 testing done which was negative  · 10/31/2022: Patient had bone scan which basically showed evidence for mild progression of multifocal osseous metastatic disease.  There appears to be new activity corresponding to the thoracic cervical spine, left scapula, left sacrum and left proximal femur.  CT scan of the chest otherwise is stable.  There is a nonobstructing stone on the left kidney.          Past Medical History:   Diagnosis  Date   • Allergic    • Bone cancer (HCC)     METASTATIC BONE   • Bradycardia     SECONDARY TO ABLATION   • Breast cancer (HCC) 2017    mets to lymph nodes; did not do radiation   • Cancer of unknown origin (HCC)    • Compression fx, thoracic spine, open, initial encounter (HCC)    • Coronary artery disease    • COVID-19 2021   • Diabetes mellitus (HCC)    • Heart disease, unspecified    • Hepatitis C     RESOLVED WITH MEDICATION   • Hyperlipidemia    • Hypertension    • Obesity (BMI 30-39.9) 2021   • Rib fracture     Per patient   • Sleep apnea     no machine   • Tachycardia     ATRIAL   • Type 2 diabetes mellitus (HCC) 2017       Past Surgical History:   Procedure Laterality Date   • BACK SURGERY      neck X 2   • BREAST RECONSTRUCTION Bilateral    • CARDIAC ABLATION       atrial tachycardia x 5 ablations    • CARDIAC CATHETERIZATION     • CARDIAC SURGERY      stent placed in aorta   • CARDIAC SURGERY      6 surgeries as baby    • CERVICAL FUSION ANTERIOR WITH ARTIFICIAL DISCECTOMY IMPLANTATION N/A 2020    Procedure: C4 VERTEBRECTOMY AND ANTERIOR CERIVCAL DISCECTOMY WITH FUSION OF CERVICAL THREE THROUGH FIVE WITH REMOVAL OF HARDWARE C5-C6;  Surgeon: Mark Kaba MD;  Location: [facility] MAIN OR;  Service: Neurosurgery;  Laterality: N/A;   •  SECTION      x2   • COLONOSCOPY N/A 10/23/2020    Procedure: COLONOSCOPY WITH POLYPECTOMY X6;  Surgeon: Stanley Bourne MD;  Location: [facility] ENDOSCOPY;  Service: Gastroenterology;  Laterality: N/A;  POLYPS, INTERNAL HEMORRHOIDS   • ENDOMETRIAL ABLATION      REMOVAL SCAR TISSUE UTERINE   • ENDOSCOPY N/A 10/23/2020    Procedure: ESOPHAGOGASTRODUODENOSCOPY WITH BIOPSY X 1 AREA;  Surgeon: Stanley Bourne MD;  Location: [facility] ENDOSCOPY;  Service: Gastroenterology;  Laterality: N/A;  GASTRITIS, ESOPHAGITIS, HIATAL HERNIA   • KNEE SURGERY     • MASTECTOMY Bilateral    • NECK SURGERY     • PACEMAKER IMPLANTATION     • PAIN  PUMP INSERTION/REVISION N/A 4/5/2022    Procedure: PAIN PUMP INSERTION AND INTRATHECAL CATHETER PLACEMENT;  Surgeon: Chuck Hunter MD;  [facility];  Service: Pain Management;  Laterality: N/A;         Current Outpatient Medications:   •  acetaminophen (TYLENOL) 650 MG 8 hr tablet, Take 650 mg by mouth As Needed for Mild Pain . TAKES BETWEEN PAIN MEDS, Disp: , Rfl:   •  aspirin 81 MG chewable tablet, Chew 1 tablet Daily., Disp: 30 tablet, Rfl: 1  •  azithromycin (Zithromax Z-Marcelino) 250 MG tablet, Take 2 tablets by mouth on day 1, then 1 tablet daily on days 2-5, Disp: 6 tablet, Rfl: 0  •  Blood Glucose Monitoring Suppl (Accu-Chek Araceli) device, Use as instructed   To test blood sugar bid, Disp: 1 each, Rfl: 0  •  Calcium Carbonate-Vit D-Min (Calcium 600+D Plus Minerals) 600-400 MG-UNIT chewable tablet, Chew 600 mg 2 (Two) Times a Day. (Patient taking differently: Chew 600 mg 2 (Two) Times a Day. GUMMIES), Disp: 60 each, Rfl: 6  •  glucose blood (Accu-Chek Araceli Plus) test strip, Use as instructed   To test bid, Disp: 200 each, Rfl: 3  •  ibuprofen (ADVIL,MOTRIN) 400 MG tablet, Take 400 mg by mouth As Needed for Mild Pain . IN BETWEEN PAIN  MEDS, Disp: , Rfl:   •  insulin NPH-insulin regular (humuLIN 70/30,novoLIN 70/30) (70-30) 100 UNIT/ML injection, Inject 40 Units under the skin into the appropriate area as directed Every Morning., Disp: , Rfl:   •  insulin NPH-insulin regular (humuLIN 70/30,novoLIN 70/30) (70-30) 100 UNIT/ML injection, Inject 30 Units under the skin into the appropriate area as directed Every Evening., Disp: , Rfl:   •  Insulin Pen Needle (B-D UF III MINI PEN NEEDLES) 31G X 5 MM misc, Use to inject insulin twice daily   DX: E11.9, Disp: 100 each, Rfl: 0  •  Insulin Syringe-Needle U-100 28G X 1/2\" 1 ML misc, 1 each 2 (Two) Times a Day., Disp: 100 each, Rfl: 6  •  Lancets (accu-chek soft touch) lancets, Test bid, Disp: 200 each, Rfl: 3  •  lisinopril (PRINIVIL,ZESTRIL) 20 MG tablet,  Take 20 mg by mouth Daily., Disp: , Rfl:   •  rosuvastatin (Crestor) 20 MG tablet, Take 1 tablet by mouth Every Night., Disp: 90 tablet, Rfl: 0  No current facility-administered medications for this visit.    Allergies   Allergen Reactions   • Promethazine Other (See Comments)     Hyperactive mean   • Tape Other (See Comments)     .blisters         Family History   Problem Relation Age of Onset   • Heart disease Mother    • Stroke Mother    • Lung cancer Mother    • Aneurysm Father    • Diabetes Sister    • No Known Problems Brother    • No Known Problems Brother    • Diabetes type I Half-Sister    • Thyroid cancer Half-Sister    • Cancer Maternal Aunt    • Heart attack Sister    • Thyroid disease Sister    • No Known Problems Sister        Cancer-related family history includes Cancer in her maternal aunt; Lung cancer in her mother; Thyroid cancer in her half-sister.    Social History     Tobacco Use   • Smoking status: Never     Passive exposure: Never   • Smokeless tobacco: Never   Vaping Use   • Vaping Use: Never used   Substance Use Topics   • Alcohol use: Yes     Alcohol/week: 1.0 standard drink     Types: 1 Glasses of wine per week     Comment: rare   • Drug use: Not Currently     I have reviewed and confirmed the accuracy of the patient's history: Chief complaint, HPI, ROS and Subjective as entered by the MA/LPN/RN. Марияmiles Nunez MD 01/06/23       SUBJECTIVE:    She is here for follow-up visit after a prolonged absence as she was last seen by me March 2022    Patient was scheduled to begin Faslodex but she has not had injection since last visit.  She comes in complaining of generalized body aches and pains.  She has not discontinued Arimidex          REVIEW OF SYSTEMS:     Review of Systems   Constitutional: Negative for chills and fever.   HENT: Negative for ear pain, mouth sores, nosebleeds and sore throat.    Eyes: Negative for photophobia and visual disturbance.   Respiratory: Negative for  wheezing and stridor.    Cardiovascular: Negative for chest pain and palpitations.   Gastrointestinal: Negative for abdominal pain, diarrhea, nausea and vomiting.   Endocrine: Negative for cold intolerance and heat intolerance.   Genitourinary: Negative for dysuria and hematuria.   Musculoskeletal: Negative for joint swelling and neck stiffness.   Skin: Negative for color change and rash.   Neurological: Negative for seizures and syncope.   Hematological: Negative for adenopathy.        No obvious bleeding   Psychiatric/Behavioral: Negative for agitation, confusion and hallucinations.     OBJECTIVE:    Vitals:    01/06/23 1055   BP: (!) 187/100  Comment: pt is in pain   Pulse: 82   Resp: 20   Temp: 97 °F (36.1 °C)   TempSrc: Infrared   Weight: 68 kg (150 lb)   Height: 154.9 cm (61\")   PainSc: 10-Worst pain ever   PainLoc: Comment: whole body     Body mass index is 28.34 kg/m².    ECOG    (1) Restricted in physically strenuous activity, ambulatory and able to do work of light nature    Physical Exam  Vitals and nursing note reviewed.   Constitutional:       General: She is not in acute distress.     Appearance: Normal appearance. She is not diaphoretic.   HENT:      Head: Normocephalic and atraumatic.      Mouth/Throat:      Mouth: Mucous membranes are moist.      Pharynx: Oropharynx is clear.   Eyes:      General: No scleral icterus.        Right eye: No discharge.         Left eye: No discharge.      Extraocular Movements: Extraocular movements intact.      Conjunctiva/sclera: Conjunctivae normal.   Neck:      Thyroid: No thyromegaly.   Cardiovascular:      Rate and Rhythm: Normal rate and regular rhythm.      Pulses: Normal pulses.      Heart sounds: Normal heart sounds.     No friction rub. No gallop.   Pulmonary:      Effort: Pulmonary effort is normal. No respiratory distress.      Breath sounds: Normal breath sounds. No stridor. No wheezing.   Abdominal:      General: Bowel sounds are normal. There is  distension.      Palpations: Abdomen is soft. There is no mass.      Tenderness: There is abdominal tenderness. There is guarding (Left upper abdomen). There is no rebound.   Musculoskeletal:         General: No tenderness. Normal range of motion.      Cervical back: Normal range of motion and neck supple.      Right lower leg: No edema.      Left lower leg: No edema.      Comments: Neck pain.  Patient has a neck collar.   Lymphadenopathy:      Cervical: No cervical adenopathy.   Skin:     General: Skin is warm and dry.      Capillary Refill: Capillary refill takes less than 2 seconds.      Findings: No bruising, erythema or rash.   Neurological:      Mental Status: She is alert and oriented to person, place, and time.      Cranial Nerves: No cranial nerve deficit.      Sensory: No sensory deficit.      Motor: No abnormal muscle tone.   Psychiatric:         Mood and Affect: Mood normal.         Behavior: Behavior normal.         Thought Content: Thought content normal.         Judgment: Judgment normal.       I have reexamined the patient and the results are consistent with the previously documented exam. Мария Lian Nunez MD     RECENT LABS    WBC   Date Value Ref Range Status   01/06/2023 5.74 3.40 - 10.80 10*3/mm3 Final     RBC   Date Value Ref Range Status   01/06/2023 4.30 3.77 - 5.28 10*6/mm3 Final     Hemoglobin   Date Value Ref Range Status   01/06/2023 12.4 12.0 - 15.9 g/dL Final     Hematocrit   Date Value Ref Range Status   01/06/2023 38.5 34.0 - 46.6 % Final     MCV   Date Value Ref Range Status   01/06/2023 89.5 79.0 - 97.0 fL Final     MCH   Date Value Ref Range Status   01/06/2023 28.8 26.6 - 33.0 pg Final     MCHC   Date Value Ref Range Status   01/06/2023 32.2 31.5 - 35.7 g/dL Final     RDW   Date Value Ref Range Status   01/06/2023 13.6 12.3 - 15.4 % Final     RDW-SD   Date Value Ref Range Status   01/06/2023 43.0 37.0 - 54.0 fl Final     MPV   Date Value Ref Range Status   01/06/2023 9.7 6.0  - 12.0 fL Final     Platelets   Date Value Ref Range Status   01/06/2023 127 (L) 140 - 450 10*3/mm3 Final     Neutrophil %   Date Value Ref Range Status   01/06/2023 60.6 42.7 - 76.0 % Final     Lymphocyte %   Date Value Ref Range Status   01/06/2023 30.0 19.6 - 45.3 % Final     Monocyte %   Date Value Ref Range Status   01/06/2023 8.2 5.0 - 12.0 % Final     Eosinophil %   Date Value Ref Range Status   01/06/2023 0.9 0.3 - 6.2 % Final     Basophil %   Date Value Ref Range Status   01/06/2023 0.3 0.0 - 1.5 % Final     Immature Grans %   Date Value Ref Range Status   07/20/2020 0.7 (H) 0.0 - 0.5 % Final     Neutrophils, Absolute   Date Value Ref Range Status   01/06/2023 3.48 1.70 - 7.00 10*3/mm3 Final     Lymphocytes, Absolute   Date Value Ref Range Status   01/06/2023 1.72 0.70 - 3.10 10*3/mm3 Final     Monocytes, Absolute   Date Value Ref Range Status   01/06/2023 0.47 0.10 - 0.90 10*3/mm3 Final     Eosinophils, Absolute   Date Value Ref Range Status   01/06/2023 0.05 0.00 - 0.40 10*3/mm3 Final     Basophils, Absolute   Date Value Ref Range Status   01/06/2023 0.02 0.00 - 0.20 10*3/mm3 Final     Immature Grans, Absolute   Date Value Ref Range Status   07/20/2020 0.05 0.00 - 0.05 10*3/mm3 Final     nRBC   Date Value Ref Range Status   05/19/2022 0.1 0.0 - 0.2 /100 WBC Final       Lab Results   Component Value Date    GLUCOSE 95 01/06/2023    BUN 21 01/06/2023    CREATININE 0.87 01/06/2023    EGFRIFNONA 70 12/20/2021    EGFRIFAFRI >60 10/12/2018    BCR 24.1 01/06/2023    K 4.5 01/06/2023    CO2 26.0 01/06/2023    CALCIUM 9.6 01/06/2023    PROTENTOTREF 7.4 12/14/2020    ALBUMIN 4.2 01/06/2023    LABIL2 0.9 12/14/2020    AST 30 01/06/2023    ALT 25 01/06/2023       ASSESSMENT:    · Metastatic breast cancer presenting as 1.1 cm mixed lytic/sclerotic hypermetabolic lesion in the left hemisacrum suspicious for metastatic disease 1.2 cm sclerotic lesion on L4, small L3 lesion and T11 lesion.  Tumor is ER positive, MA  positive and HER-2/bishop negative.  Patient was prescribed combination of Ibrance and Arimidex.  She discontinued Ibrance a few months ago due to pulmonary issues.  Patient has not been to the office since March 2022.  She is currently on single agent Arimidex but due to evidence of collateral progression, will discontinue Arimidex and switched to Faslodex.  We will see if this works if it does not work then would add a different CDK 4 inhibitor.  Reviewed with patient.  Discussed the benefits and side effects of Faslodex with her.  She was encouraged to start Faslodex as recommended.  She will be reassessed again in 6 weeks  · Bone metastasis: Xgeva recommended but patient has not started this treatment  · Pancytopenia secondary to Ibrance: Improved off Ibrance  · Ongoing back pain issues: Reviewed her bone scan . MRI of the lumbar and pelvis area. We will also give patient referral to pain management with Dr. Hunter.  Continue Percocet to 10 mg every 4-6 hours as needed for pain.  Patient encouraged to follow-up with pain management  · ypT2 N1 aM0 status post left modified radical mastectomy with lymph node dissection and right prophylactic mastectomy in 2017 performed at [facility].  ER positive, MO positive and HER-2/bishop negative.  Status post bilateral chest wall reconstruction.  According to patient, she tolerated Arimidex very well except that she also had osteoporosis therefore Arimidex was discontinued at that time  · Intolerance of tamoxifen in the past  · Osteoporosis: She was on Prolia: We will transition to Xgeva since she has bone metastases  · Status post neoadjuvant Arimidex prior to bilateral mastectomies  · Thrombocytopenia: Work-up was - December 2020  · Neck pain status post cervical spine surgery: Resolved  · Complex cardiac medical history including tetralogy of Fallot, coarctation of aorta, VSD status post repair.  Status post stent placement for coarctation of  aorta  · Personal history and strong family history of breast cancer in multiple in the relatives on paternal side of the family including 4 paternal aunts in their 30s and 40s and 2 maternal aunts at age of 20s and 50s.  There is concern for possible hereditary breast cancer syndrome.  Patient may be  interested in pursuing cancer genetics for her management.  · Assessment has been reviewed and updated          Discussion    Patient has metastatic breast cancer estrogen and progesterone dependent and HER-2/bishop negative.  She is currently on Arimidex.  We will add Ibrance.  We will also discontinue Prolia and begin Xgeva to help reduce skeletal events.           Plans:     · Reviewed CT scan chest abdomen and pelvis, no visceral metastasis but she has mild progression of her bone metastasis.  Arimidex was discontinued and patient was placed on Faslodex but she has not started injection  · She has rising tumor markers for CEA CA 15-3 and CA 27.29   · Guardant 360 for NGS testing was negative for any actionable mutations  · Discontinue Arimidex and begin Faslodex early next week.  Reviewed with patient  · Follow-up with pain management with Dr. Hunter  · Guardian 360 does not show any actionable mutation.  Would consider soft tissue biopsy at time of progression for additional NGS testing.  Reviewed with patient  · Advised patient to remain compliant with follow-up  · Referred to pulmonary for her dyspnea: Dr. Julio.  Appointment is pending  · Follow-up with pain management for ongoing pain issues  · Follow-up with /counselor   · Reviewed work-up for thrombocytopenia which was negative  · Reviewed her bone density which showed osteoporosis  · Schedule Xgeva 120 mg subcu monthly once her dental procedures are completed  · All questions answered  · Follow-up in 6 weeks  · All questions answered            Patient verbalized understanding and is in agreement of the above plan.                I spent 30  total minutes, face-to-face, caring for Gladys today.  90% of this time involved counseling and/or coordination of care as documented within this note.

## 2023-01-06 ENCOUNTER — OFFICE VISIT (OUTPATIENT)
Dept: ONCOLOGY | Facility: CLINIC | Age: 63
End: 2023-01-06
Payer: MEDICARE

## 2023-01-06 ENCOUNTER — LAB (OUTPATIENT)
Dept: LAB | Facility: HOSPITAL | Age: 63
End: 2023-01-06
Payer: MEDICARE

## 2023-01-06 ENCOUNTER — HOSPITAL ENCOUNTER (OUTPATIENT)
Dept: ONCOLOGY | Facility: HOSPITAL | Age: 63
Setting detail: INFUSION SERIES
Discharge: HOME | End: 2023-01-06
Payer: MEDICARE

## 2023-01-06 VITALS
HEART RATE: 82 BPM | DIASTOLIC BLOOD PRESSURE: 100 MMHG | HEIGHT: 61 IN | WEIGHT: 150 LBS | SYSTOLIC BLOOD PRESSURE: 187 MMHG | RESPIRATION RATE: 20 BRPM | BODY MASS INDEX: 28.32 KG/M2 | TEMPERATURE: 97 F

## 2023-01-06 DIAGNOSIS — C50.919 MALIGNANT NEOPLASM OF FEMALE BREAST, UNSPECIFIED ESTROGEN RECEPTOR STATUS, UNSPECIFIED LATERALITY, UNSPECIFIED SITE OF BREAST: Primary | ICD-10-CM

## 2023-01-06 DIAGNOSIS — C50.919 MALIGNANT NEOPLASM OF FEMALE BREAST, UNSPECIFIED ESTROGEN RECEPTOR STATUS, UNSPECIFIED LATERALITY, UNSPECIFIED SITE OF BREAST: ICD-10-CM

## 2023-01-06 DIAGNOSIS — C79.51 BONE METASTASIS: Primary | ICD-10-CM

## 2023-01-06 LAB
ALBUMIN SERPL-MCNC: 4.2 G/DL (ref 3.5–5.2)
ALBUMIN/GLOB SERPL: 1.4 G/DL
ALP SERPL-CCNC: 121 U/L (ref 39–117)
ALT SERPL W P-5'-P-CCNC: 25 U/L (ref 1–33)
ANION GAP SERPL CALCULATED.3IONS-SCNC: 11 MMOL/L (ref 5–15)
AST SERPL-CCNC: 30 U/L (ref 1–32)
BASOPHILS # BLD AUTO: 0.02 10*3/MM3 (ref 0–0.2)
BASOPHILS NFR BLD AUTO: 0.3 % (ref 0–1.5)
BILIRUB SERPL-MCNC: 0.3 MG/DL (ref 0–1.2)
BUN SERPL-MCNC: 21 MG/DL (ref 8–23)
BUN/CREAT SERPL: 24.1 (ref 7–25)
CALCIUM SPEC-SCNC: 9.6 MG/DL (ref 8.6–10.5)
CANCER AG15-3 SERPL-ACNC: 124 U/ML
CEA SERPL-MCNC: 32.7 NG/ML
CHLORIDE SERPL-SCNC: 102 MMOL/L (ref 98–107)
CO2 SERPL-SCNC: 26 MMOL/L (ref 22–29)
CREAT SERPL-MCNC: 0.87 MG/DL (ref 0.57–1)
DEPRECATED RDW RBC AUTO: 43 FL (ref 37–54)
EGFRCR SERPLBLD CKD-EPI 2021: 76.4 ML/MIN/1.73
EOSINOPHIL # BLD AUTO: 0.05 10*3/MM3 (ref 0–0.4)
EOSINOPHIL NFR BLD AUTO: 0.9 % (ref 0.3–6.2)
ERYTHROCYTE [DISTWIDTH] IN BLOOD BY AUTOMATED COUNT: 13.6 % (ref 12.3–15.4)
GLOBULIN UR ELPH-MCNC: 3 GM/DL
GLUCOSE SERPL-MCNC: 95 MG/DL (ref 65–99)
HCT VFR BLD AUTO: 38.5 % (ref 34–46.6)
HGB BLD-MCNC: 12.4 G/DL (ref 12–15.9)
HOLD SPECIMEN: NORMAL
LYMPHOCYTES # BLD AUTO: 1.72 10*3/MM3 (ref 0.7–3.1)
LYMPHOCYTES NFR BLD AUTO: 30 % (ref 19.6–45.3)
MCH RBC QN AUTO: 28.8 PG (ref 26.6–33)
MCHC RBC AUTO-ENTMCNC: 32.2 G/DL (ref 31.5–35.7)
MCV RBC AUTO: 89.5 FL (ref 79–97)
MONOCYTES # BLD AUTO: 0.47 10*3/MM3 (ref 0.1–0.9)
MONOCYTES NFR BLD AUTO: 8.2 % (ref 5–12)
NEUTROPHILS NFR BLD AUTO: 3.48 10*3/MM3 (ref 1.7–7)
NEUTROPHILS NFR BLD AUTO: 60.6 % (ref 42.7–76)
PLATELET # BLD AUTO: 127 10*3/MM3 (ref 140–450)
PMV BLD AUTO: 9.7 FL (ref 6–12)
POTASSIUM SERPL-SCNC: 4.5 MMOL/L (ref 3.5–5.2)
PROT SERPL-MCNC: 7.2 G/DL (ref 6–8.5)
RBC # BLD AUTO: 4.3 10*6/MM3 (ref 3.77–5.28)
SODIUM SERPL-SCNC: 139 MMOL/L (ref 136–145)
WBC NRBC COR # BLD: 5.74 10*3/MM3 (ref 3.4–10.8)

## 2023-01-06 PROCEDURE — 86300 IMMUNOASSAY TUMOR CA 15-3: CPT

## 2023-01-06 PROCEDURE — 36415 COLL VENOUS BLD VENIPUNCTURE: CPT

## 2023-01-06 PROCEDURE — 99214 OFFICE O/P EST MOD 30 MIN: CPT | Performed by: INTERNAL MEDICINE

## 2023-01-06 PROCEDURE — 96402 CHEMO HORMON ANTINEOPL SQ/IM: CPT

## 2023-01-06 PROCEDURE — 80053 COMPREHEN METABOLIC PANEL: CPT

## 2023-01-06 PROCEDURE — 82378 CARCINOEMBRYONIC ANTIGEN: CPT | Performed by: INTERNAL MEDICINE

## 2023-01-06 PROCEDURE — 25010000002 FULVESTRANT PER 25 MG: Performed by: INTERNAL MEDICINE

## 2023-01-06 PROCEDURE — 85025 COMPLETE CBC W/AUTO DIFF WBC: CPT

## 2023-01-06 RX ORDER — LAMOTRIGINE 25 MG/1
500 TABLET ORAL ONCE
Status: COMPLETED | OUTPATIENT
Start: 2023-01-06 | End: 2023-01-06

## 2023-01-06 RX ADMIN — FULVESTRANT 500 MG: 250 INJECTION, SOLUTION INTRAMUSCULAR at 12:48

## 2023-01-06 NOTE — PROGRESS NOTES
Faslodex administered per care plan. One injection given in left buttock of 250mg/5mL, the other given in the Right buttock of 250mg/5mL, for a total dose of 500mg/5mL. Pt denies having any issues and tolerated injection well.

## 2023-01-07 LAB — CANCER AG27-29 SERPL-ACNC: 181.8 U/ML (ref 0–38.6)

## 2023-01-16 ENCOUNTER — HOSPITAL ENCOUNTER (OUTPATIENT)
Dept: PAIN MEDICINE | Facility: HOSPITAL | Age: 63
Discharge: HOME | End: 2023-01-16
Payer: MEDICARE

## 2023-01-16 VITALS
HEIGHT: 61 IN | SYSTOLIC BLOOD PRESSURE: 179 MMHG | OXYGEN SATURATION: 96 % | RESPIRATION RATE: 16 BRPM | BODY MASS INDEX: 28.32 KG/M2 | WEIGHT: 150 LBS | HEART RATE: 73 BPM | DIASTOLIC BLOOD PRESSURE: 78 MMHG | TEMPERATURE: 97.5 F

## 2023-01-16 DIAGNOSIS — G89.3 CANCER ASSOCIATED PAIN: Primary | ICD-10-CM

## 2023-01-16 DIAGNOSIS — R52 PAIN: ICD-10-CM

## 2023-01-16 PROCEDURE — 62370 ANL SP INF PMP W/MDREPRG&FIL: CPT | Performed by: STUDENT IN AN ORGANIZED HEALTH CARE EDUCATION/TRAINING PROGRAM

## 2023-01-16 PROCEDURE — 77003 FLUOROGUIDE FOR SPINE INJECT: CPT

## 2023-01-16 NOTE — PROCEDURES
Intrathecal Pump Refill / Reprogram with Fluoroscopic Guidance:    PREOPERATIVE DIAGNOSIS:  Presence of IDDS system.  Cancer pain.    POSTOPERATIVE DIAGNOSIS:  Same as preop diagnosis    PROCEDURE:   Intrathecal pump Refill / Reprogram requiring MD expertise, and requirement for fluoroscopic guidance.  CPT 30451, 28562    IDDS System:   Medtronic Synchromed II      PRE-PROCEDURE DISCUSSION WITH PATIENT:    Risks and complications were discussed with the patient prior to starting the procedure and informed consent was obtained.  We discussed various topics including but not limited to bleeding, infection, injury, nerve injury, paralysis, coma, overdose, reaction to injectate and/or medication, death, postprocedural painful flare-up, postprocedural site soreness, and a lack of pain relief resulting from the procedure or the knowledge gained from the procedure, and a risk of equipment malfunction or damage.        SURGEON:  Chuck Hunter MD    REASON FOR PROCEDURE:     The intrathecal pump could not be filled with a blind percutaneous technique in the office.  After multiple attempts, the procedure in the office was aborted and the patient was scheduled for image-guided refill for patient safety.      SEDATION:  Patient declined administration of moderate sedation      LOCAL ANESTHETICS:  NONE,     DESCRIPTON OF PROCEDURE:  The patient taken to the operating room and was placed in the prone  position.  All pressure points were well padded.     The pump was interrogated.  The pump is currently running a solution of Morphine at a concentration of 1mg/ml and at a dose of 0.1800 on the basal infusion.  In addition, a PTM dose was not programmed.    The site of the pump was identified.  The appropriate area was prepped with Chloraprep and draped in a sterile fashion.    The area overlying the central port was not anesthetized with subcutaneous solution of local anesthetic.  Fluoroscopy was utilized to visualize the  orientation of the pump in its pocket.  The proprietary pump refill kit was used to puncture the skin and enter into the reservoir access port.      Aspiration was attempted and successful.   The Expected Residual Volume (ERV) was 4.9.  The Actual Residual Volume aspirated was 4.5.  This amount was discarded.       The pump was then refilled with Morphine 1mg/ml.  The infusion program was changed.      Basal Rate: 0.200    Refill Date: 4/16/23    The needle was removed intact.  Vital signs were stable throughout.        ESTIMATED BLOOD LOSS:  none  SPECIMENS:  none    COMPLICATIONS:   No complications were noted.    TOLERANCE & DISCHARGE CONDITION:    The patient tolerated the procedure well.  Pump site is intact with minimal tenderness, and no erythema nor drainage.  The patient was transported to the recovery area without difficulties.  The patient was discharged to home under the care of family in stable and satisfactory condition.      PLAN OF CARE:  1. The patient was given our standard instruction sheet.  2. The patient will Plan for refill prior to refill date.  3. The patient will resume all medications as per the medication reconciliation sheet.        4.   Patient will return for pump refill when due or sooner if needed.

## 2023-01-16 NOTE — ADDENDUM NOTE
Encounter addended by: Monae Rowe RN on: 1/16/2023 2:10 PM   Actions taken: Clinical Note Signed, Flowsheet accepted

## 2023-01-17 ENCOUNTER — TELEPHONE (OUTPATIENT)
Dept: PAIN MEDICINE | Facility: HOSPITAL | Age: 63
End: 2023-01-17
Payer: MEDICARE

## 2023-02-02 DIAGNOSIS — C79.51 BONE METASTASIS: Primary | ICD-10-CM

## 2023-02-02 DIAGNOSIS — C50.919 MALIGNANT NEOPLASM OF FEMALE BREAST, UNSPECIFIED ESTROGEN RECEPTOR STATUS, UNSPECIFIED LATERALITY, UNSPECIFIED SITE OF BREAST: ICD-10-CM

## 2023-02-02 RX ORDER — LAMOTRIGINE 25 MG/1
500 TABLET ORAL ONCE
Status: CANCELLED | OUTPATIENT
Start: 2023-02-03

## 2023-02-03 ENCOUNTER — HOSPITAL ENCOUNTER (OUTPATIENT)
Dept: ONCOLOGY | Facility: HOSPITAL | Age: 63
Discharge: HOME | End: 2023-02-03
Payer: MEDICARE

## 2023-02-06 ENCOUNTER — TELEPHONE (OUTPATIENT)
Dept: ONCOLOGY | Facility: CLINIC | Age: 63
End: 2023-02-06
Payer: MEDICARE

## 2023-02-06 NOTE — TELEPHONE ENCOUNTER
Caller: Gladys Garrison    Relationship to patient: Self    Best call back number: [phone]    Chief complaint:  IS HAVING OPEN HEART SURGERY TOMORROW AND NEEDING TO BE WITH HIM RIGHT NOW TO TAKE CARE OF HIM, NEEDING TO MOVE APPTS OUT     Type of visit: 02/13 INFUSION, AND LAB AND FOLLOW UP 02/17    Requested date: 6 WEEKS OUT     If rescheduling, when is the original appointment: 02/13, AND 02/17    Additional notes:

## 2023-02-09 ENCOUNTER — APPOINTMENT (OUTPATIENT)
Dept: GENERAL RADIOLOGY | Facility: HOSPITAL | Age: 63
End: 2023-02-09
Payer: MEDICARE

## 2023-02-09 ENCOUNTER — HOSPITAL ENCOUNTER (EMERGENCY)
Facility: HOSPITAL | Age: 63
Discharge: HOME | End: 2023-02-09
Attending: EMERGENCY MEDICINE | Admitting: EMERGENCY MEDICINE
Payer: MEDICARE

## 2023-02-09 VITALS
TEMPERATURE: 97.9 F | WEIGHT: 150 LBS | BODY MASS INDEX: 28.32 KG/M2 | OXYGEN SATURATION: 95 % | HEART RATE: 70 BPM | DIASTOLIC BLOOD PRESSURE: 69 MMHG | SYSTOLIC BLOOD PRESSURE: 144 MMHG | RESPIRATION RATE: 19 BRPM | HEIGHT: 61 IN

## 2023-02-09 DIAGNOSIS — R10.11 RIGHT UPPER QUADRANT PAIN: ICD-10-CM

## 2023-02-09 DIAGNOSIS — R07.9 CHEST PAIN, UNSPECIFIED TYPE: Primary | ICD-10-CM

## 2023-02-09 DIAGNOSIS — C50.919 METASTATIC BREAST CANCER: ICD-10-CM

## 2023-02-09 LAB
ALBUMIN SERPL-MCNC: 4.9 G/DL (ref 3.5–5.2)
ALBUMIN/GLOB SERPL: 1.7 G/DL
ALP SERPL-CCNC: 126 U/L (ref 39–117)
ALT SERPL W P-5'-P-CCNC: 25 U/L (ref 1–33)
ANION GAP SERPL CALCULATED.3IONS-SCNC: 13 MMOL/L (ref 5–15)
AST SERPL-CCNC: 38 U/L (ref 1–32)
BASOPHILS # BLD AUTO: 0 10*3/MM3 (ref 0–0.2)
BASOPHILS NFR BLD AUTO: 0.3 % (ref 0–1.5)
BILIRUB SERPL-MCNC: 0.5 MG/DL (ref 0–1.2)
BUN SERPL-MCNC: 16 MG/DL (ref 8–23)
BUN/CREAT SERPL: 21.9 (ref 7–25)
CALCIUM SPEC-SCNC: 10.2 MG/DL (ref 8.6–10.5)
CHLORIDE SERPL-SCNC: 103 MMOL/L (ref 98–107)
CO2 SERPL-SCNC: 26 MMOL/L (ref 22–29)
CREAT SERPL-MCNC: 0.73 MG/DL (ref 0.57–1)
DEPRECATED RDW RBC AUTO: 41.1 FL (ref 37–54)
EGFRCR SERPLBLD CKD-EPI 2021: 94.3 ML/MIN/1.73
EOSINOPHIL # BLD AUTO: 0 10*3/MM3 (ref 0–0.4)
EOSINOPHIL NFR BLD AUTO: 0.4 % (ref 0.3–6.2)
ERYTHROCYTE [DISTWIDTH] IN BLOOD BY AUTOMATED COUNT: 13.7 % (ref 12.3–15.4)
GLOBULIN UR ELPH-MCNC: 2.9 GM/DL
GLUCOSE SERPL-MCNC: 74 MG/DL (ref 65–99)
HCT VFR BLD AUTO: 41.4 % (ref 34–46.6)
HGB BLD-MCNC: 13.2 G/DL (ref 12–15.9)
HOLD SPECIMEN: NORMAL
LYMPHOCYTES # BLD AUTO: 1.1 10*3/MM3 (ref 0.7–3.1)
LYMPHOCYTES NFR BLD AUTO: 13.3 % (ref 19.6–45.3)
MCH RBC QN AUTO: 27.6 PG (ref 26.6–33)
MCHC RBC AUTO-ENTMCNC: 31.8 G/DL (ref 31.5–35.7)
MCV RBC AUTO: 86.7 FL (ref 79–97)
MONOCYTES # BLD AUTO: 0.4 10*3/MM3 (ref 0.1–0.9)
MONOCYTES NFR BLD AUTO: 4.5 % (ref 5–12)
NEUTROPHILS NFR BLD AUTO: 7 10*3/MM3 (ref 1.7–7)
NEUTROPHILS NFR BLD AUTO: 81.5 % (ref 42.7–76)
NRBC BLD AUTO-RTO: 0 /100 WBC (ref 0–0.2)
PLATELET # BLD AUTO: 139 10*3/MM3 (ref 140–450)
PMV BLD AUTO: 7.8 FL (ref 6–12)
POTASSIUM SERPL-SCNC: 4.4 MMOL/L (ref 3.5–5.2)
PROT SERPL-MCNC: 7.8 G/DL (ref 6–8.5)
RBC # BLD AUTO: 4.77 10*6/MM3 (ref 3.77–5.28)
SODIUM SERPL-SCNC: 142 MMOL/L (ref 136–145)
TROPONIN T SERPL HS-MCNC: 13 NG/L
WBC NRBC COR # BLD: 8.6 10*3/MM3 (ref 3.4–10.8)
WHOLE BLOOD HOLD COAG: NORMAL

## 2023-02-09 PROCEDURE — 85025 COMPLETE CBC W/AUTO DIFF WBC: CPT | Performed by: EMERGENCY MEDICINE

## 2023-02-09 PROCEDURE — 36415 COLL VENOUS BLD VENIPUNCTURE: CPT

## 2023-02-09 PROCEDURE — 84484 ASSAY OF TROPONIN QUANT: CPT | Performed by: EMERGENCY MEDICINE

## 2023-02-09 PROCEDURE — 80053 COMPREHEN METABOLIC PANEL: CPT | Performed by: EMERGENCY MEDICINE

## 2023-02-09 PROCEDURE — 71045 X-RAY EXAM CHEST 1 VIEW: CPT

## 2023-02-09 PROCEDURE — 93005 ELECTROCARDIOGRAM TRACING: CPT

## 2023-02-09 PROCEDURE — 99284 EMERGENCY DEPT VISIT MOD MDM: CPT

## 2023-02-09 NOTE — ED PROVIDER NOTES
Subjective   History of Present Illness  62-year-old female presents with complaints of chest pain she has had this for about a week she has had neck pain for about 5 days she states she has had pain in her liver for about 2-1/2 weeks.  She has history of metastatic breast cancer.  She states she has been breaking out in sweats for about 6 weeks.  She does report chronic back pain.  She has a morphine pain pump.  She states she has been scheduled for radiation but has not done that she had.  She reports she canceled her last oncology appointment.  Review of Systems    Past Medical History:   Diagnosis Date   • Allergic    • Bone cancer (HCC)     METASTATIC BONE   • Bradycardia     SECONDARY TO ABLATION   • Breast cancer (HCC) 2017    mets to lymph nodes; did not do radiation   • Cancer of unknown origin (HCC)    • Compression fx, thoracic spine, open, initial encounter (HCC)    • Coronary artery disease    • COVID-19 09/01/2021   • Diabetes mellitus (HCC)    • Heart disease, unspecified    • Hepatitis C     RESOLVED WITH MEDICATION   • Hyperlipidemia    • Hypertension    • Obesity (BMI 30-39.9) 02/05/2021   • Rib fracture     Per patient   • Sleep apnea     no machine   • Tachycardia     ATRIAL   • Type 2 diabetes mellitus (HCC) 11/2017       Allergies   Allergen Reactions   • Promethazine Other (See Comments)     Hyperactive mean   • Tape Other (See Comments)     .blisters         Past Surgical History:   Procedure Laterality Date   • BACK SURGERY      neck X 2   • BREAST RECONSTRUCTION Bilateral    • CARDIAC ABLATION       atrial tachycardia x 5 ablations    • CARDIAC CATHETERIZATION     • CARDIAC SURGERY      stent placed in aorta   • CARDIAC SURGERY      6 surgeries as baby    • CERVICAL FUSION ANTERIOR WITH ARTIFICIAL DISCECTOMY IMPLANTATION N/A 09/22/2020    Procedure: C4 VERTEBRECTOMY AND ANTERIOR CERIVCAL DISCECTOMY WITH FUSION OF CERVICAL THREE THROUGH FIVE WITH REMOVAL OF HARDWARE C5-C6;  Surgeon: Sendy  Mark Sosa MD;  Location: [facility] MAIN OR;  Service: Neurosurgery;  Laterality: N/A;   •  SECTION      x2   • COLONOSCOPY N/A 10/23/2020    Procedure: COLONOSCOPY WITH POLYPECTOMY X6;  Surgeon: Stanley Bourne MD;  Location: [facility] ENDOSCOPY;  Service: Gastroenterology;  Laterality: N/A;  POLYPS, INTERNAL HEMORRHOIDS   • ENDOMETRIAL ABLATION      REMOVAL SCAR TISSUE UTERINE   • ENDOSCOPY N/A 10/23/2020    Procedure: ESOPHAGOGASTRODUODENOSCOPY WITH BIOPSY X 1 AREA;  Surgeon: Stanley Bourne MD;  Location: [facility] ENDOSCOPY;  Service: Gastroenterology;  Laterality: N/A;  GASTRITIS, ESOPHAGITIS, HIATAL HERNIA   • KNEE SURGERY     • MASTECTOMY Bilateral    • NECK SURGERY     • PACEMAKER IMPLANTATION     • PAIN PUMP INSERTION/REVISION N/A 2022    Procedure: PAIN PUMP INSERTION AND INTRATHECAL CATHETER PLACEMENT;  Surgeon: Chuck Hunter MD;  Location: [facility] MAIN OR;  Service: Pain Management;  Laterality: N/A;       Family History   Problem Relation Age of Onset   • Heart disease Mother    • Stroke Mother    • Lung cancer Mother    • Aneurysm Father    • Diabetes Sister    • No Known Problems Brother    • No Known Problems Brother    • Diabetes type I Half-Sister    • Thyroid cancer Half-Sister    • Cancer Maternal Aunt    • Heart attack Sister    • Thyroid disease Sister    • No Known Problems Sister        Social History     Socioeconomic History   • Marital status:    • Number of children: 2   Tobacco Use   • Smoking status: Never     Passive exposure: Never   • Smokeless tobacco: Never   Vaping Use   • Vaping Use: Never used   Substance and Sexual Activity   • Alcohol use: Yes     Alcohol/week: 1.0 standard drink     Types: 1 Glasses of wine per week     Comment: rare   • Drug use: Not Currently   • Sexual activity: Defer     Prior to Admission medications    Medication Sig Start Date End Date Taking? Authorizing Provider   acetaminophen (TYLENOL) 650 MG 8 hr tablet  "Take 650 mg by mouth As Needed for Mild Pain . TAKES BETWEEN PAIN MEDS    Magdalene Russell MD   aspirin 81 MG chewable tablet Chew 1 tablet Daily. 12/24/19   Cedric Duval Jr., MD   azithromycin (Zithromax Z-Marcelino) 250 MG tablet Take 2 tablets by mouth on day 1, then 1 tablet daily on days 2-5 11/9/22   Chirag Robles,    Blood Glucose Monitoring Suppl (Accu-Chek Araceli) device Use as instructed   To test blood sugar bid 10/25/21   Angelica Rodriguez MD   Calcium Carbonate-Vit D-Min (Calcium 600+D Plus Minerals) 600-400 MG-UNIT chewable tablet Chew 600 mg 2 (Two) Times a Day.  Patient taking differently: Chew 600 mg 2 (Two) Times a Day. GUMMIES 2/12/21   Мария Nunez MD   glucose blood (Accu-Chek Araceli Plus) test strip Use as instructed   To test bid 10/25/21   Angelica Rodriguez MD   ibuprofen (ADVIL,MOTRIN) 400 MG tablet Take 400 mg by mouth As Needed for Mild Pain . IN BETWEEN PAIN  MEDS    Magdalene Russell MD   insulin NPH-insulin regular (humuLIN 70/30,novoLIN 70/30) (70-30) 100 UNIT/ML injection Inject 40 Units under the skin into the appropriate area as directed Every Morning.    Magdalene Russell MD   insulin NPH-insulin regular (humuLIN 70/30,novoLIN 70/30) (70-30) 100 UNIT/ML injection Inject 30 Units under the skin into the appropriate area as directed Every Evening.    Magdalene Russell MD   Insulin Pen Needle (B-D UF III MINI PEN NEEDLES) 31G X 5 MM misc Use to inject insulin twice daily   DX: E11.9 8/22/22   Chirag Robles, DO   Insulin Syringe-Needle U-100 28G X 1/2\" 1 ML misc 1 each 2 (Two) Times a Day. 11/18/22   Chirag Robles DO   Lancets (accu-chek soft touch) lancets Test bid 10/25/21   Angelica Rodriguez MD   lisinopril (PRINIVIL,ZESTRIL) 20 MG tablet Take 20 mg by mouth Daily.    Magdalene Russell MD   rosuvastatin (Crestor) 20 MG tablet Take 1 tablet by mouth Every Night. 3/9/21   Preethi Vasquez APRN           Objective "   Physical Exam  62-year-old female awake alert.  Generally well-developed well-nourished.  Pupils equal round react light neck supple chest clear cardiovascular regular rhythm she complains of some chest wall tenderness.  She complains of upper abdominal tenderness predominant in the right.  Extremities without tenderness edema.  Procedures           ED Course      Results for orders placed or performed during the hospital encounter of 02/09/23   Comprehensive Metabolic Panel    Specimen: Blood   Result Value Ref Range    Glucose 74 65 - 99 mg/dL    BUN 16 8 - 23 mg/dL    Creatinine 0.73 0.57 - 1.00 mg/dL    Sodium 142 136 - 145 mmol/L    Potassium 4.4 3.5 - 5.2 mmol/L    Chloride 103 98 - 107 mmol/L    CO2 26.0 22.0 - 29.0 mmol/L    Calcium 10.2 8.6 - 10.5 mg/dL    Total Protein 7.8 6.0 - 8.5 g/dL    Albumin 4.9 3.5 - 5.2 g/dL    ALT (SGPT) 25 1 - 33 U/L    AST (SGOT) 38 (H) 1 - 32 U/L    Alkaline Phosphatase 126 (H) 39 - 117 U/L    Total Bilirubin 0.5 0.0 - 1.2 mg/dL    Globulin 2.9 gm/dL    A/G Ratio 1.7 g/dL    BUN/Creatinine Ratio 21.9 7.0 - 25.0    Anion Gap 13.0 5.0 - 15.0 mmol/L    eGFR 94.3 >60.0 mL/min/1.73   Single High Sensitivity Troponin T    Specimen: Blood   Result Value Ref Range    HS Troponin T 13 (H) <10 ng/L   CBC Auto Differential    Specimen: Blood   Result Value Ref Range    WBC 8.60 3.40 - 10.80 10*3/mm3    RBC 4.77 3.77 - 5.28 10*6/mm3    Hemoglobin 13.2 12.0 - 15.9 g/dL    Hematocrit 41.4 34.0 - 46.6 %    MCV 86.7 79.0 - 97.0 fL    MCH 27.6 26.6 - 33.0 pg    MCHC 31.8 31.5 - 35.7 g/dL    RDW 13.7 12.3 - 15.4 %    RDW-SD 41.1 37.0 - 54.0 fl    MPV 7.8 6.0 - 12.0 fL    Platelets 139 (L) 140 - 450 10*3/mm3    Neutrophil % 81.5 (H) 42.7 - 76.0 %    Lymphocyte % 13.3 (L) 19.6 - 45.3 %    Monocyte % 4.5 (L) 5.0 - 12.0 %    Eosinophil % 0.4 0.3 - 6.2 %    Basophil % 0.3 0.0 - 1.5 %    Neutrophils, Absolute 7.00 1.70 - 7.00 10*3/mm3    Lymphocytes, Absolute 1.10 0.70 - 3.10 10*3/mm3    Monocytes,  "Absolute 0.40 0.10 - 0.90 10*3/mm3    Eosinophils, Absolute 0.00 0.00 - 0.40 10*3/mm3    Basophils, Absolute 0.00 0.00 - 0.20 10*3/mm3    nRBC 0.0 0.0 - 0.2 /100 WBC   ECG 12 Lead Chest Pain   Result Value Ref Range    QT Interval 468 ms   Gold Top - SST   Result Value Ref Range    Extra Tube done      XR Chest 1 View    Result Date: 2/9/2023  Impression: 1. Stable cardiomegaly with postoperative changes of bypass surgery and transvenous pacemaker placement. 2. No active disease Electronically Signed: Christ Dowling  2/9/2023 1:05 PM EST  Workstation ID: [id number]    Medications - No data to display  /69   Pulse 70   Temp 97.9 °F (36.6 °C) (Oral)   Resp 19   Ht 154.9 cm (61\")   Wt 68 kg (150 lb)   LMP  (LMP Unknown)   SpO2 95%   BMI 28.34 kg/m²                                        MDM  Chart review: Patient had of visit January 16 for intrathecal pump refill that required imaging guiding refill as could not be filled by blind percutaneous technique.  She is noted to have bone metastasis from oncology orders on the second of this month  Comorbidity: As per past history   Differential: Chest wall pain, hepatic metastasis.  MI felt to be unlikely due to chronicity of her pain and high sensitive troponin 13   My EKG interpretation: ventricular paced rhythm rate of 71 unchanged from previous  Lab: High sensitive troponin 13 CBC normal white count hemoglobin platelet count mildly low at 139 comprehensive metabolic panel remarkable only for an AST of 38 alkaline phosphatase of 126  My Radiology review and interpretation: Cardiomegaly with clear lung fields no significant change compared to previous.  There appear to be some old rib fractures  Discussion/treatment: Patient's findings were discussed with her.  She was offered medication for pain but declined.  I suspect her pain is secondary to her metastatic cancer. her  has just had open heart surgery she does not desire admission she just wanted to be " sure she was not having a heart attack.  She will be discharged at her request to follow-up with her primary about his return new or worsening symptoms  Patient was evaluated using appropriate PPE      Final diagnoses:   Chest pain, unspecified type   Right upper quadrant pain   Metastatic breast cancer (HCC)       ED Disposition  ED Disposition     ED Disposition   Discharge    Condition   Stable    Comment   --             Chirag Robles,   [address]  [address]  [place] [facility] [place] [id number] [phone]          Мария Nunez MD  [address]  [address]  [place] [id number] [phone]               Medication List      Changed    Calcium 600+D Plus Minerals 600-400 MG-UNIT chewable tablet  Chew 600 mg 2 (Two) Times a Day.  What changed: additional instructions             Austin Panda MD  02/09/23 1402       Austin Panda MD  02/09/23 0832

## 2023-02-12 LAB — QT INTERVAL: 468 MS

## 2023-02-16 ENCOUNTER — TELEPHONE (OUTPATIENT)
Dept: FAMILY MEDICINE CLINIC | Facility: CLINIC | Age: 63
End: 2023-02-16
Payer: MEDICARE

## 2023-02-16 DIAGNOSIS — Q24.9 CONGENITAL HEART DISEASE: Primary | ICD-10-CM

## 2023-02-16 NOTE — TELEPHONE ENCOUNTER
Caller: Gladys Garrison    Relationship: Self    Best call back number:[phone]     What is the medical concern/diagnosis: CONGENIAL HEART DISEASE    What specialty or service is being requested:CARDIOLOGY    What is the provider, practice or medical service name:DR MICHELLE NOE     What is the office location: [facility]     What is the office phone number: [phone]    Any additional details:     REQUESTING NEW REFERRAL,

## 2023-02-22 NOTE — TELEPHONE ENCOUNTER
PATIENT ASKED TO BE REFERRED TO DR NOE BUT WAS REFERRED TO DR ANGUIANO INSTEAD AND SAID SHE WOULD RATHER DIE THAN SEE HIM.

## 2023-03-07 ENCOUNTER — APPOINTMENT (OUTPATIENT)
Dept: GENERAL RADIOLOGY | Facility: HOSPITAL | Age: 63
End: 2023-03-07
Payer: MEDICARE

## 2023-03-07 ENCOUNTER — HOSPITAL ENCOUNTER (OUTPATIENT)
Facility: HOSPITAL | Age: 63
Setting detail: OBSERVATION
Discharge: HOME | End: 2023-03-08
Attending: EMERGENCY MEDICINE | Admitting: EMERGENCY MEDICINE
Payer: MEDICARE

## 2023-03-07 ENCOUNTER — APPOINTMENT (OUTPATIENT)
Dept: CT IMAGING | Facility: HOSPITAL | Age: 63
End: 2023-03-07
Payer: MEDICARE

## 2023-03-07 DIAGNOSIS — R55 SYNCOPE, UNSPECIFIED SYNCOPE TYPE: Primary | ICD-10-CM

## 2023-03-07 LAB
ALBUMIN SERPL-MCNC: 4.5 G/DL (ref 3.5–5.2)
ALBUMIN/GLOB SERPL: 1.3 G/DL
ALP SERPL-CCNC: 94 U/L (ref 39–117)
ALT SERPL W P-5'-P-CCNC: 18 U/L (ref 1–33)
ANION GAP SERPL CALCULATED.3IONS-SCNC: 13 MMOL/L (ref 5–15)
AST SERPL-CCNC: 28 U/L (ref 1–32)
BASOPHILS # BLD AUTO: 0 10*3/MM3 (ref 0–0.2)
BASOPHILS NFR BLD AUTO: 0.4 % (ref 0–1.5)
BILIRUB SERPL-MCNC: 0.6 MG/DL (ref 0–1.2)
BUN SERPL-MCNC: 12 MG/DL (ref 8–23)
BUN/CREAT SERPL: 15.8 (ref 7–25)
CALCIUM SPEC-SCNC: 10.6 MG/DL (ref 8.6–10.5)
CHLORIDE SERPL-SCNC: 101 MMOL/L (ref 98–107)
CK SERPL-CCNC: 98 U/L (ref 20–180)
CO2 SERPL-SCNC: 25 MMOL/L (ref 22–29)
CREAT SERPL-MCNC: 0.76 MG/DL (ref 0.57–1)
DEPRECATED RDW RBC AUTO: 42.9 FL (ref 37–54)
EGFRCR SERPLBLD CKD-EPI 2021: 89.8 ML/MIN/1.73
EOSINOPHIL # BLD AUTO: 0.1 10*3/MM3 (ref 0–0.4)
EOSINOPHIL NFR BLD AUTO: 1.3 % (ref 0.3–6.2)
ERYTHROCYTE [DISTWIDTH] IN BLOOD BY AUTOMATED COUNT: 14 % (ref 12.3–15.4)
GEN 5 2HR TROPONIN T REFLEX: 17 NG/L
GLOBULIN UR ELPH-MCNC: 3.4 GM/DL
GLUCOSE SERPL-MCNC: 104 MG/DL (ref 65–99)
HCT VFR BLD AUTO: 43 % (ref 34–46.6)
HGB BLD-MCNC: 13.7 G/DL (ref 12–15.9)
LYMPHOCYTES # BLD AUTO: 1.6 10*3/MM3 (ref 0.7–3.1)
LYMPHOCYTES NFR BLD AUTO: 25.5 % (ref 19.6–45.3)
MAGNESIUM SERPL-MCNC: 2.2 MG/DL (ref 1.6–2.4)
MCH RBC QN AUTO: 27.7 PG (ref 26.6–33)
MCHC RBC AUTO-ENTMCNC: 31.8 G/DL (ref 31.5–35.7)
MCV RBC AUTO: 87.1 FL (ref 79–97)
MONOCYTES # BLD AUTO: 0.5 10*3/MM3 (ref 0.1–0.9)
MONOCYTES NFR BLD AUTO: 7.9 % (ref 5–12)
NEUTROPHILS NFR BLD AUTO: 4.2 10*3/MM3 (ref 1.7–7)
NEUTROPHILS NFR BLD AUTO: 64.9 % (ref 42.7–76)
NRBC BLD AUTO-RTO: 0 /100 WBC (ref 0–0.2)
PLATELET # BLD AUTO: 142 10*3/MM3 (ref 140–450)
PMV BLD AUTO: 7.7 FL (ref 6–12)
POTASSIUM SERPL-SCNC: 4.9 MMOL/L (ref 3.5–5.2)
PROT SERPL-MCNC: 7.9 G/DL (ref 6–8.5)
RBC # BLD AUTO: 4.94 10*6/MM3 (ref 3.77–5.28)
SODIUM SERPL-SCNC: 139 MMOL/L (ref 136–145)
TROPONIN T DELTA: 3 NG/L
TROPONIN T SERPL HS-MCNC: 14 NG/L
WBC NRBC COR # BLD: 6.4 10*3/MM3 (ref 3.4–10.8)

## 2023-03-07 PROCEDURE — 72110 X-RAY EXAM L-2 SPINE 4/>VWS: CPT

## 2023-03-07 PROCEDURE — 70450 CT HEAD/BRAIN W/O DYE: CPT

## 2023-03-07 PROCEDURE — 36415 COLL VENOUS BLD VENIPUNCTURE: CPT | Performed by: EMERGENCY MEDICINE

## 2023-03-07 PROCEDURE — 85025 COMPLETE CBC W/AUTO DIFF WBC: CPT | Performed by: EMERGENCY MEDICINE

## 2023-03-07 PROCEDURE — G0378 HOSPITAL OBSERVATION PER HR: HCPCS

## 2023-03-07 PROCEDURE — 80053 COMPREHEN METABOLIC PANEL: CPT | Performed by: EMERGENCY MEDICINE

## 2023-03-07 PROCEDURE — 93005 ELECTROCARDIOGRAM TRACING: CPT

## 2023-03-07 PROCEDURE — 84484 ASSAY OF TROPONIN QUANT: CPT | Performed by: EMERGENCY MEDICINE

## 2023-03-07 PROCEDURE — 71046 X-RAY EXAM CHEST 2 VIEWS: CPT

## 2023-03-07 PROCEDURE — 83735 ASSAY OF MAGNESIUM: CPT | Performed by: EMERGENCY MEDICINE

## 2023-03-07 PROCEDURE — 82550 ASSAY OF CK (CPK): CPT | Performed by: EMERGENCY MEDICINE

## 2023-03-07 PROCEDURE — 73030 X-RAY EXAM OF SHOULDER: CPT

## 2023-03-07 PROCEDURE — 72072 X-RAY EXAM THORAC SPINE 3VWS: CPT

## 2023-03-07 PROCEDURE — 99285 EMERGENCY DEPT VISIT HI MDM: CPT

## 2023-03-07 PROCEDURE — 73010 X-RAY EXAM OF SHOULDER BLADE: CPT

## 2023-03-07 PROCEDURE — 72125 CT NECK SPINE W/O DYE: CPT

## 2023-03-07 RX ORDER — SODIUM CHLORIDE 9 MG/ML
100 INJECTION, SOLUTION INTRAVENOUS CONTINUOUS
Status: DISCONTINUED | OUTPATIENT
Start: 2023-03-08 | End: 2023-03-08 | Stop reason: HOSPADM

## 2023-03-07 RX ORDER — ACETAMINOPHEN 325 MG/1
650 TABLET ORAL EVERY 6 HOURS PRN
Status: DISCONTINUED | OUTPATIENT
Start: 2023-03-07 | End: 2023-03-08 | Stop reason: HOSPADM

## 2023-03-07 RX ORDER — NALOXONE HCL 0.4 MG/ML
0.4 VIAL (ML) INJECTION
Status: DISCONTINUED | OUTPATIENT
Start: 2023-03-07 | End: 2023-03-08 | Stop reason: HOSPADM

## 2023-03-07 RX ORDER — MORPHINE SULFATE 2 MG/ML
1 INJECTION, SOLUTION INTRAMUSCULAR; INTRAVENOUS EVERY 4 HOURS PRN
Status: DISCONTINUED | OUTPATIENT
Start: 2023-03-07 | End: 2023-03-08 | Stop reason: HOSPADM

## 2023-03-07 RX ORDER — SODIUM CHLORIDE 0.9 % (FLUSH) 0.9 %
10 SYRINGE (ML) INJECTION EVERY 12 HOURS SCHEDULED
Status: DISCONTINUED | OUTPATIENT
Start: 2023-03-08 | End: 2023-03-08 | Stop reason: HOSPADM

## 2023-03-07 RX ORDER — SODIUM CHLORIDE 9 MG/ML
40 INJECTION, SOLUTION INTRAVENOUS AS NEEDED
Status: DISCONTINUED | OUTPATIENT
Start: 2023-03-07 | End: 2023-03-08 | Stop reason: HOSPADM

## 2023-03-07 RX ORDER — SODIUM CHLORIDE 0.9 % (FLUSH) 0.9 %
10 SYRINGE (ML) INJECTION AS NEEDED
Status: DISCONTINUED | OUTPATIENT
Start: 2023-03-07 | End: 2023-03-08 | Stop reason: HOSPADM

## 2023-03-07 RX ORDER — NITROGLYCERIN 0.4 MG/1
0.4 TABLET SUBLINGUAL
Status: DISCONTINUED | OUTPATIENT
Start: 2023-03-07 | End: 2023-03-08 | Stop reason: HOSPADM

## 2023-03-07 RX ADMIN — Medication 10 ML: at 23:24

## 2023-03-07 RX ADMIN — SODIUM CHLORIDE 100 ML/HR: 9 INJECTION, SOLUTION INTRAVENOUS at 23:25

## 2023-03-07 RX ADMIN — ACETAMINOPHEN 650 MG: 325 TABLET, FILM COATED ORAL at 23:24

## 2023-03-08 ENCOUNTER — READMISSION MANAGEMENT (OUTPATIENT)
Dept: CALL CENTER | Facility: HOSPITAL | Age: 63
End: 2023-03-08
Payer: MEDICARE

## 2023-03-08 ENCOUNTER — APPOINTMENT (OUTPATIENT)
Dept: CARDIOLOGY | Facility: HOSPITAL | Age: 63
End: 2023-03-08
Payer: MEDICARE

## 2023-03-08 VITALS
OXYGEN SATURATION: 96 % | DIASTOLIC BLOOD PRESSURE: 86 MMHG | WEIGHT: 149 LBS | TEMPERATURE: 97.6 F | SYSTOLIC BLOOD PRESSURE: 144 MMHG | HEART RATE: 70 BPM | RESPIRATION RATE: 16 BRPM | HEIGHT: 61 IN | BODY MASS INDEX: 28.13 KG/M2

## 2023-03-08 LAB
ANION GAP SERPL CALCULATED.3IONS-SCNC: 11 MMOL/L (ref 5–15)
BUN SERPL-MCNC: 16 MG/DL (ref 8–23)
BUN/CREAT SERPL: 21.3 (ref 7–25)
CALCIUM SPEC-SCNC: 9.2 MG/DL (ref 8.6–10.5)
CHLORIDE SERPL-SCNC: 105 MMOL/L (ref 98–107)
CO2 SERPL-SCNC: 24 MMOL/L (ref 22–29)
CREAT SERPL-MCNC: 0.75 MG/DL (ref 0.57–1)
DEPRECATED RDW RBC AUTO: 43.8 FL (ref 37–54)
EGFRCR SERPLBLD CKD-EPI 2021: 91.3 ML/MIN/1.73
ERYTHROCYTE [DISTWIDTH] IN BLOOD BY AUTOMATED COUNT: 13.8 % (ref 12.3–15.4)
GLUCOSE BLDC GLUCOMTR-MCNC: 108 MG/DL (ref 70–105)
GLUCOSE BLDC GLUCOMTR-MCNC: 124 MG/DL (ref 70–105)
GLUCOSE BLDC GLUCOMTR-MCNC: 141 MG/DL (ref 70–105)
GLUCOSE SERPL-MCNC: 133 MG/DL (ref 65–99)
HCT VFR BLD AUTO: 40.3 % (ref 34–46.6)
HGB BLD-MCNC: 13.3 G/DL (ref 12–15.9)
MCH RBC QN AUTO: 28.1 PG (ref 26.6–33)
MCHC RBC AUTO-ENTMCNC: 32.9 G/DL (ref 31.5–35.7)
MCV RBC AUTO: 85.4 FL (ref 79–97)
NT-PROBNP SERPL-MCNC: 347.3 PG/ML (ref 0–900)
PLATELET # BLD AUTO: 133 10*3/MM3 (ref 140–450)
PMV BLD AUTO: 7.6 FL (ref 6–12)
POTASSIUM SERPL-SCNC: 3.8 MMOL/L (ref 3.5–5.2)
RBC # BLD AUTO: 4.73 10*6/MM3 (ref 3.77–5.28)
SODIUM SERPL-SCNC: 140 MMOL/L (ref 136–145)
TROPONIN T SERPL HS-MCNC: 13 NG/L
WBC NRBC COR # BLD: 4.7 10*3/MM3 (ref 3.4–10.8)

## 2023-03-08 PROCEDURE — 82962 GLUCOSE BLOOD TEST: CPT

## 2023-03-08 PROCEDURE — 83880 ASSAY OF NATRIURETIC PEPTIDE: CPT | Performed by: PHYSICIAN ASSISTANT

## 2023-03-08 PROCEDURE — G0378 HOSPITAL OBSERVATION PER HR: HCPCS

## 2023-03-08 PROCEDURE — 84484 ASSAY OF TROPONIN QUANT: CPT | Performed by: EMERGENCY MEDICINE

## 2023-03-08 PROCEDURE — 93306 TTE W/DOPPLER COMPLETE: CPT | Performed by: INTERNAL MEDICINE

## 2023-03-08 PROCEDURE — 99214 OFFICE O/P EST MOD 30 MIN: CPT | Performed by: INTERNAL MEDICINE

## 2023-03-08 PROCEDURE — 80048 BASIC METABOLIC PNL TOTAL CA: CPT | Performed by: EMERGENCY MEDICINE

## 2023-03-08 PROCEDURE — 93306 TTE W/DOPPLER COMPLETE: CPT

## 2023-03-08 PROCEDURE — 97161 PT EVAL LOW COMPLEX 20 MIN: CPT

## 2023-03-08 PROCEDURE — 85027 COMPLETE CBC AUTOMATED: CPT | Performed by: EMERGENCY MEDICINE

## 2023-03-08 RX ORDER — LISINOPRIL 20 MG/1
20 TABLET ORAL DAILY
Status: DISCONTINUED | OUTPATIENT
Start: 2023-03-08 | End: 2023-03-08 | Stop reason: HOSPADM

## 2023-03-08 RX ORDER — ASPIRIN 81 MG/1
81 TABLET, CHEWABLE ORAL DAILY
Status: DISCONTINUED | OUTPATIENT
Start: 2023-03-08 | End: 2023-03-08 | Stop reason: HOSPADM

## 2023-03-08 RX ORDER — NICOTINE POLACRILEX 4 MG
15 LOZENGE BUCCAL
Status: DISCONTINUED | OUTPATIENT
Start: 2023-03-08 | End: 2023-03-08 | Stop reason: HOSPADM

## 2023-03-08 RX ORDER — ROSUVASTATIN CALCIUM 10 MG/1
20 TABLET, COATED ORAL NIGHTLY
Status: DISCONTINUED | OUTPATIENT
Start: 2023-03-08 | End: 2023-03-08 | Stop reason: HOSPADM

## 2023-03-08 RX ORDER — OLANZAPINE 10 MG/2ML
1 INJECTION, POWDER, LYOPHILIZED, FOR SOLUTION INTRAMUSCULAR
Status: DISCONTINUED | OUTPATIENT
Start: 2023-03-08 | End: 2023-03-08 | Stop reason: HOSPADM

## 2023-03-08 RX ORDER — INSULIN LISPRO 100 [IU]/ML
2-9 INJECTION, SOLUTION INTRAVENOUS; SUBCUTANEOUS
Status: DISCONTINUED | OUTPATIENT
Start: 2023-03-08 | End: 2023-03-08 | Stop reason: HOSPADM

## 2023-03-08 RX ORDER — DEXTROSE MONOHYDRATE 25 G/50ML
25 INJECTION, SOLUTION INTRAVENOUS
Status: DISCONTINUED | OUTPATIENT
Start: 2023-03-08 | End: 2023-03-08 | Stop reason: HOSPADM

## 2023-03-08 RX ADMIN — LISINOPRIL 20 MG: 20 TABLET ORAL at 09:10

## 2023-03-08 RX ADMIN — Medication 10 ML: at 09:10

## 2023-03-08 RX ADMIN — ASPIRIN 81 MG CHEWABLE TABLET 81 MG: 81 TABLET CHEWABLE at 09:10

## 2023-03-08 NOTE — PLAN OF CARE
Problem: Adult Inpatient Plan of Care  Goal: Plan of Care Review  Outcome: Ongoing, Progressing  Flowsheets (Taken 3/8/2023 0430)  Progress: no change  Plan of Care Reviewed With: patient  Outcome Evaluation: new admit for syncope  will have a cardiology consult  Goal: Patient-Specific Goal (Individualized)  Outcome: Ongoing, Progressing  Goal: Absence of Hospital-Acquired Illness or Injury  Outcome: Ongoing, Progressing  Intervention: Identify and Manage Fall Risk  Recent Flowsheet Documentation  Taken 3/8/2023 0409 by Raquel Moran RN  Safety Promotion/Fall Prevention:   safety round/check completed   room organization consistent   nonskid shoes/slippers when out of bed   lighting adjusted   fall prevention program maintained   clutter free environment maintained   assistive device/personal items within reach   activity supervised  Taken 3/8/2023 0230 by Raquel Moran RN  Safety Promotion/Fall Prevention:   safety round/check completed   room organization consistent   nonskid shoes/slippers when out of bed   lighting adjusted   clutter free environment maintained   fall prevention program maintained   assistive device/personal items within reach   activity supervised  Taken 3/7/2023 2303 by Raquel Moran RN  Safety Promotion/Fall Prevention:   safety round/check completed   room organization consistent   nonskid shoes/slippers when out of bed   lighting adjusted   fall prevention program maintained   clutter free environment maintained   assistive device/personal items within reach   activity supervised  Intervention: Prevent Skin Injury  Recent Flowsheet Documentation  Taken 3/8/2023 0409 by Raquel Moran RN  Body Position: position changed independently  Taken 3/8/2023 0230 by Raquel Moran RN  Body Position: position changed independently  Taken 3/7/2023 2303 by Rqauel Moran RN  Body Position: position changed independently  Intervention: Prevent and Manage VTE  (Venous Thromboembolism) Risk  Recent Flowsheet Documentation  Taken 3/7/2023 2303 by Raquel Moran, RN  Activity Management: activity adjusted per tolerance  Intervention: Prevent Infection  Recent Flowsheet Documentation  Taken 3/8/2023 0409 by Raquel Moran RN  Infection Prevention:   single patient room provided   personal protective equipment utilized   rest/sleep promoted   hand hygiene promoted  Taken 3/8/2023 0230 by Raquel Moran RN  Infection Prevention:   single patient room provided   rest/sleep promoted   personal protective equipment utilized   hand hygiene promoted  Taken 3/7/2023 2303 by Raquel Moran RN  Infection Prevention:   single patient room provided   rest/sleep promoted   personal protective equipment utilized   hand hygiene promoted  Goal: Optimal Comfort and Wellbeing  Outcome: Ongoing, Progressing  Intervention: Provide Person-Centered Care  Recent Flowsheet Documentation  Taken 3/7/2023 2303 by Raquel Moran RN  Trust Relationship/Rapport:   care explained   choices provided   questions answered   questions encouraged   reassurance provided   thoughts/feelings acknowledged  Goal: Readiness for Transition of Care  Outcome: Ongoing, Progressing  Intervention: Mutually Develop Transition Plan  Recent Flowsheet Documentation  Taken 3/7/2023 2307 by Raquel Moran RN  Transportation Anticipated: family or friend will provide  Patient/Family Anticipated Services at Transition: none  Patient/Family Anticipates Transition to: home with family  Taken 3/7/2023 2259 by Raquel Moran RN  Equipment Currently Used at Home: glucometer     Problem: Fall Injury Risk  Goal: Absence of Fall and Fall-Related Injury  Outcome: Ongoing, Progressing  Intervention: Identify and Manage Contributors  Recent Flowsheet Documentation  Taken 3/8/2023 0409 by Raquel Moran RN  Medication Review/Management: medications reviewed  Self-Care Promotion:   BADL personal  objects within reach   BADL personal routines maintained  Taken 3/8/2023 0230 by Raquel Moran, RN  Medication Review/Management: medications reviewed  Taken 3/7/2023 2303 by Raquel Moran, RN  Medication Review/Management: medications reviewed  Self-Care Promotion:   BADL personal routines maintained   BADL personal objects within reach   independence encouraged  Intervention: Promote Injury-Free Environment  Recent Flowsheet Documentation  Taken 3/8/2023 0409 by Raquel Moran, RN  Safety Promotion/Fall Prevention:   safety round/check completed   room organization consistent   nonskid shoes/slippers when out of bed   lighting adjusted   fall prevention program maintained   clutter free environment maintained   assistive device/personal items within reach   activity supervised  Taken 3/8/2023 0230 by Raquel Moran, RN  Safety Promotion/Fall Prevention:   safety round/check completed   room organization consistent   nonskid shoes/slippers when out of bed   lighting adjusted   clutter free environment maintained   fall prevention program maintained   assistive device/personal items within reach   activity supervised  Taken 3/7/2023 2303 by Raquel Moran, RN  Safety Promotion/Fall Prevention:   safety round/check completed   room organization consistent   nonskid shoes/slippers when out of bed   lighting adjusted   fall prevention program maintained   clutter free environment maintained   assistive device/personal items within reach   activity supervised     Problem: Diabetes Comorbidity  Goal: Blood Glucose Level Within Targeted Range  Outcome: Ongoing, Progressing  Intervention: Monitor and Manage Glycemia  Recent Flowsheet Documentation  Taken 3/8/2023 0409 by Raquel Moran, RN  Glycemic Management: blood glucose monitored  Taken 3/7/2023 2303 by Raquel Moran RN  Glycemic Management: blood glucose monitored     Problem: Hypertension Comorbidity  Goal: Blood Pressure in  Desired Range  Outcome: Ongoing, Progressing  Intervention: Maintain Blood Pressure Management  Recent Flowsheet Documentation  Taken 3/8/2023 0409 by Raquel Moran, RN  Medication Review/Management: medications reviewed  Taken 3/8/2023 0230 by Raquel Moran, RN  Medication Review/Management: medications reviewed  Taken 3/7/2023 2303 by Raquel Moran, RN  Medication Review/Management: medications reviewed     Problem: Obstructive Sleep Apnea Risk or Actual Comorbidity Management  Goal: Unobstructed Breathing During Sleep  Outcome: Ongoing, Progressing   Goal Outcome Evaluation:  Plan of Care Reviewed With: patient        Progress: no change  Outcome Evaluation: new admit for syncope  will have a cardiology consult

## 2023-03-08 NOTE — PLAN OF CARE
Goal Outcome Evaluation:    Patient denies pain. IVF infusing. PT eval pending. Awaiting cardio recommendations.

## 2023-03-08 NOTE — THERAPY EVALUATION
Patient Name: Gladys Garrison  : 1960    MRN: [id number]                              Today's Date: 3/8/2023       Admit Date: 3/7/2023    Visit Dx:     ICD-10-CM ICD-9-CM   1. Syncope, unspecified syncope type  R55 780.2     Patient Active Problem List   Diagnosis   • Hyperlipidemia   • Hypertensive disorder   • Osteoarthritis of multiple joints   • Pulmonary hypertension (HCC)   • Pulmonary valve disorder   • Malignant tumor of breast (HCC)   • Tetralogy of Fallot   • Tricuspid valve regurgitation   • Controlled type 2 diabetes mellitus without complication, with long-term current use of insulin (HCC)   • Vitamin D deficiency   • Acquired spondylolisthesis of cervical vertebra   • Adjacent segment disease with spinal stenosis   • Cervical spondylosis with myelopathy   • Cervical myelopathy with cervical radiculopathy (CMS/HCC)   • Osteoporosis   • S/P cervical spinal fusion   • Lesion of lumbar spine   • Atherosclerosis of coronary artery of native heart without angina pectoris   • Rib pain on right side   • Memory loss of unknown cause   • Malignant neoplasm of female breast (HCC)   • Bone metastasis (HCC)   • Supraventricular tachycardia (HCC)   • Thrombocytopenia (HCC)   • Systolic congestive heart failure (HCC)   • COVID-19   • Elevated AST (SGOT)   • Hyponatremia   • Pacemaker   • Sick sinus syndrome (HCC)   • AV block, complete (HCC)   • Cancer associated pain   • Dyspnea   • Pain in left knee   • Syncope, unspecified syncope type     Past Medical History:   Diagnosis Date   • Allergic    • Bone cancer (HCC)     METASTATIC BONE   • Bradycardia     SECONDARY TO ABLATION   • Breast cancer (HCC) 2017    mets to lymph nodes; did not do radiation   • Cancer of unknown origin (HCC)    • Compression fx, thoracic spine, open, initial encounter (HCC)    • Coronary artery disease    • COVID-19 2021   • Diabetes mellitus (HCC)    • Heart disease, unspecified    • Hepatitis C     RESOLVED WITH MEDICATION    • Hyperlipidemia    • Hypertension    • Obesity (BMI 30-39.9) 2021   • Rib fracture     Per patient   • Sleep apnea     no machine   • Tachycardia     ATRIAL   • Type 2 diabetes mellitus (HCC) 2017     Past Surgical History:   Procedure Laterality Date   • BACK SURGERY      neck X 2   • BREAST RECONSTRUCTION Bilateral    • CARDIAC ABLATION       atrial tachycardia x 5 ablations    • CARDIAC CATHETERIZATION     • CARDIAC SURGERY      stent placed in aorta   • CARDIAC SURGERY      6 surgeries as baby    • CERVICAL FUSION ANTERIOR WITH ARTIFICIAL DISCECTOMY IMPLANTATION N/A 2020    Procedure: C4 VERTEBRECTOMY AND ANTERIOR CERIVCAL DISCECTOMY WITH FUSION OF CERVICAL THREE THROUGH FIVE WITH REMOVAL OF HARDWARE C5-C6;  Surgeon: Mark Kaba MD;  Location: [facility] MAIN OR;  Service: Neurosurgery;  Laterality: N/A;   •  SECTION      x2   • COLONOSCOPY N/A 10/23/2020    Procedure: COLONOSCOPY WITH POLYPECTOMY X6;  Surgeon: Stanley Bourne MD;  Location: [facility] ENDOSCOPY;  Service: Gastroenterology;  Laterality: N/A;  POLYPS, INTERNAL HEMORRHOIDS   • ENDOMETRIAL ABLATION      REMOVAL SCAR TISSUE UTERINE   • ENDOSCOPY N/A 10/23/2020    Procedure: ESOPHAGOGASTRODUODENOSCOPY WITH BIOPSY X 1 AREA;  Surgeon: Stanley Bourne MD;  Location: [facility] ENDOSCOPY;  Service: Gastroenterology;  Laterality: N/A;  GASTRITIS, ESOPHAGITIS, HIATAL HERNIA   • KNEE SURGERY     • MASTECTOMY Bilateral    • NECK SURGERY     • PACEMAKER IMPLANTATION     • PAIN PUMP INSERTION/REVISION N/A 2022    Procedure: PAIN PUMP INSERTION AND INTRATHECAL CATHETER PLACEMENT;  Surgeon: Chuck Hunter MD;  Location: [facility] MAIN OR;  Service: Pain Management;  Laterality: N/A;      General Information     Row Name 23 [id number]          Physical Therapy Time and Intention    Document Type evaluation  -SS (r) MB (t) SS (c)     Mode of Treatment individual therapy;physical therapy  -SS (r) MB (t) SS (c)      Row Name 03/08/23 1721          General Information    Patient Profile Reviewed yes  -SS (r) MB (t) SS (c)     Prior Level of Function independent:;all household mobility;community mobility;gait;transfer;ADL's;work  -SS (r) MB (t) SS (c)     Existing Precautions/Restrictions no known precautions/restrictions  -SS (r) MB (t) SS (c)     Barriers to Rehab none identified  -SS (r) MB (t) SS (c)     Row Name 03/08/23 1721          Living Environment    People in Home spouse  -SS (r) MB (t) SS (c)     Row Name 03/08/23 1721          Home Main Entrance    Number of Stairs, Main Entrance none  -SS (r) MB (t) SS (c)     Row Name 03/08/23 1721          Stairs Within Home, Primary    Number of Stairs, Within Home, Primary other (see comments)  13  -SS (r) MB (t) SS (c)     Stair Railings, Within Home, Primary railings safe and in good condition  -SS (r) MB (t) SS (c)     Row Name 03/08/23 1721          Cognition    Orientation Status (Cognition) oriented x 4  -SS (r) MB (t) SS (c)     Row Name 03/08/23 1721          Safety Issues, Functional Mobility    Impairments Affecting Function (Mobility) endurance/activity tolerance;sensation/sensory awareness;shortness of breath;strength  -SS (r) MB (t) SS (c)           User Key  (r) = Recorded By, (t) = Taken By, (c) = Cosigned By    Initials Name Provider Type     Angela Millard, PT Physical Therapist    Devan Watts, PT Student PT Student               Mobility     Row Name 03/08/23 1722          Bed Mobility    Bed Mobility bed mobility (all) activities  -SS (r) MB (t) SS (c)     All Activities, [place] (Bed Mobility) modified independence  -SS (r) MB (t) SS (c)     Assistive Device (Bed Mobility) bed rails;head of bed elevated  -SS (r) MB (t) SS (c)     Row Name 03/08/23 1722          Bed-Chair Transfer    Bed-Chair [place] (Transfers) contact guard  -SS (r) MB (t) SS (c)     Comment, (Bed-Chair Transfer) no AD  -SS (r) MB (t) SS (c)     Row Name 03/08/23  1722          Sit-Stand Transfer    Sit-Stand [place] (Transfers) contact guard  -SS (r) MB (t) SS (c)     Comment, (Sit-Stand Transfer) no AD  -SS (r) MB (t) SS (c)     Row Name 03/08/23 1722          Gait/Stairs (Locomotion)    [place] Level (Gait) contact guard;standby assist  -SS (r) MB (t) SS (c)     Distance in Feet (Gait) 50  -SS (r) MB (t) SS (c)     Comment, (Gait/Stairs) amb 50' CGA>SBA no AD  -SS (r) MB (t) SS (c)           User Key  (r) = Recorded By, (t) = Taken By, (c) = Cosigned By    Initials Name Provider Type    SS Angela Millard, PT Physical Therapist    Devan Watts, KARLA Student PT Student               Obj/Interventions     Row Name 03/08/23 1723          Range of Motion Comprehensive    General Range of Motion no range of motion deficits identified  -SS (r) MB (t) SS (c)     Comment, General Range of Motion LE AROM WFL  -SS (r) MB (t) SS (c)     Row Name 03/08/23 1723          Strength Comprehensive (MMT)    General Manual Muscle Testing (MMT) Assessment no strength deficits identified  -SS (r) MB (t) SS (c)     Comment, General Manual Muscle Testing (MMT) Assessment LE Strength 4/5 grossly  -SS (r) MB (t) SS (c)     Row Name 03/08/23 1723          Balance    Balance Assessment sitting static balance;sit to stand dynamic balance;sitting dynamic balance;standing static balance;standing dynamic balance  -SS (r) MB (t) SS (c)     Static Sitting Balance independent  -SS (r) MB (t) SS (c)     Dynamic Sitting Balance independent  -SS (r) MB (t) SS (c)     Position, Sitting Balance unsupported  -SS (r) MB (t) SS (c)     Sit to Stand Dynamic Balance standby assist  -SS (r) MB (t) SS (c)     Static Standing Balance standby assist  -SS (r) MB (t) SS (c)     Dynamic Standing Balance contact guard  -SS (r) MB (t) SS (c)     Row Name 03/08/23 1723          Sensory Assessment (Somatosensory)    Sensory Assessment (Somatosensory) sensation intact  -SS (r) MB (t) SS (c)           User Key  (r)  = Recorded By, (t) = Taken By, (c) = Cosigned By    Initials Name Provider Type    SS Angela Millard, PT Physical Therapist    Devan Watts, PT Student PT Student               Goals/Plan    No documentation.                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 States she had not eaten all day when she collapsed on the floor at the cash register around 3pm. PMH includes CAD, metastatic breast cancer, DM II, and pacemaker placement. Prior to incident, pt was living with spouse in single-level home with spouse, brother-in-law, and grandson. She was IND with household mobility, community ambulation, ADLs, and working part-time at local grocery store. Currently she is able to complete bed mobility mod I, transfers CGA, and amb 50' CGA>SBA with RW. Vitals remained normal throughout session. Pt was waiting to be seen by cardiology to determine any other CV issues. Pt will not req any skilled PT intervention until d/c and will be safe to return back home with assist from . No DME necessary.  -SS (r) MB (t) SS (c) --    Row Name 03/08/23 1723          Therapy Assessment/Plan (PT)    Criteria for Skilled Interventions Met (PT) no  -SS (r) MB (t) SS (c)     Therapy Frequency (PT) evaluation only  -SS (r) MB (t) SS (c)     Row Name 03/08/23 1723          Vital Signs    Pre Systolic BP Rehab 178  sitting  -SS (r) MB (t) SS (c)     Pre Treatment Diastolic BP 78  -SS (r) MB (t) SS (c)     Intra Systolic BP Rehab 184  standing  -SS (r) MB (t) SS (c)      Intra Treatment Diastolic BP 92  -SS (r) MB (t) SS (c)     Post Systolic BP Rehab 151  sitting  -SS (r) MB (t) SS (c)     Post Treatment Diastolic BP 76  -SS (r) MB (t) SS (c)     Pretreatment Heart Rate (beats/min) 71  -SS (r) MB (t) SS (c)     Posttreatment Heart Rate (beats/min) 75  -SS (r) MB (t) SS (c)     O2 Delivery Pre Treatment room air  -SS (r) MB (t) SS (c)     O2 Delivery Intra Treatment room air  -SS (r) MB (t) SS (c)     O2 Delivery Post Treatment room air  -SS (r) MB (t) SS (c)     Pre Patient Position Supine  -SS (r) MB (t) SS (c)     Intra Patient Position Standing  -SS (r) MB (t) SS (c)     Post Patient Position Supine  -SS (r) MB (t) SS (c)     Row Name 03/08/23 1723          Positioning and Restraints    Pre-Treatment Position in bed  -SS (r) MB (t) SS (c)     Post Treatment Position bed  -SS (r) MB (t) SS (c)     In Bed notified nsg;supine;call light within reach;encouraged to call for assist;with family/caregiver  -SS (r) MB (t) SS (c)           User Key  (r) = Recorded By, (t) = Taken By, (c) = Cosigned By    Initials Name Provider Type    SS Angela Millard, PT Physical Therapist    Devan Watts, KARLA Student PT Student               Outcome Measures     Row Name 03/08/23 1726 03/08/23 0800       How much help from another person do you currently need...    Turning from your back to your side while in flat bed without using bedrails? 4  -SS (r) MB (t) SS (c) 4  -MR    Moving from lying on back to sitting on the side of a flat bed without bedrails? 4  -SS (r) MB (t) SS (c) 4  -MR    Moving to and from a bed to a chair (including a wheelchair)? 4  -SS (r) MB (t) SS (c) 4  -MR    Standing up from a chair using your arms (e.g., wheelchair, bedside chair)? 4  -SS (r) MB (t) SS (c) 4  -MR    Climbing 3-5 steps with a railing? 3  -SS (r) MB (t) SS (c) 3  -MR    To walk in hospital room? 4  -SS (r) MB (t) SS (c) 4  -MR    AM-PAC 6 Clicks Score (PT) 23  -SS (r) MB (t) 23  -MR    Highest level  of mobility 7 --> Walked 25 feet or more  -SS (r) MB (t) 7 --> Walked 25 feet or more  -MR    Row Name 03/08/23 1726          Functional Assessment    Outcome Measure Options AM-PAC 6 Clicks Basic Mobility (PT)  -SS (r) MB (t) SS (c)           User Key  (r) = Recorded By, (t) = Taken By, (c) = Cosigned By    Initials Name Provider Type    SS Angela Millard, PT Physical Therapist    MR Anupam Ma Ann, RN Registered Nurse    Devan Watts, PT Student PT Student                             Physical Therapy Education     Title: PT OT SLP Therapies (Resolved)     Topic: Physical Therapy (Resolved)     Point: Mobility training (Resolved)     Learning Progress Summary           Patient Acceptance, E,TB, VU by MB at 3/8/2023 1726                   Point: Home exercise program (Resolved)     Learning Progress Summary           Patient Acceptance, E,TB, VU by MB at 3/8/2023 1726                   Point: Body mechanics (Resolved)     Learning Progress Summary           Patient Acceptance, E,TB, VU by MB at 3/8/2023 1726                   Point: Precautions (Resolved)     Learning Progress Summary           Patient Acceptance, E,TB, VU by MB at 3/8/2023 1726                               User Key     Initials Effective Dates Name Provider Type Discipline    MB 01/30/23 -  Devan Salinas, PT Student PT Student PT              PT Recommendation and Plan     Plan of Care Reviewed With: patient, spouse  Progress: improving  Outcome Evaluation: Pt is a 61 y/o F admitted to [facility] on 3/7/23 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 States she had not eaten all day when she collapsed on the floor at the cash register around 3pm. PMH includes CAD, metastatic breast cancer, DM II, and pacemaker placement. Prior to incident, pt was living with spouse in single-level home with spouse, brother-in-law, and grandson. She was IND with household mobility, community ambulation, ADLs, and working part-time at  local [employer]. Currently she is able to complete bed mobility mod I, transfers CGA, and amb 50' CGA>SBA with RW. Vitals remained normal throughout session. Pt was waiting to be seen by cardiology to determine any other CV issues. Pt will not req any skilled PT intervention until d/c and will be safe to return back home with assist from . No DME necessary.     Time Calculation:    PT Charges     Row Name 03/08/23 1726             Time Calculation    Start Time 1029  -SS (r) MB (t) SS (c)      Stop Time 1050  -SS (r) MB (t) SS (c)      Time Calculation (min) 21 min  -SS (r) MB (t)      PT Received On 03/08/23  -SS (r) MB (t) SS (c)         Time Calculation- PT    Total Timed Code Minutes- PT 0 minute(s)  -SS (r) MB (t) SS (c)            User Key  (r) = Recorded By, (t) = Taken By, (c) = Cosigned By    Initials Name Provider Type     Angela Millard, PT Physical Therapist    Devan Watts, PT Student PT Student              Therapy Charges for Today     Code Description Service Date Service Provider Modifiers Qty    [id number] HC PT EVAL LOW COMPLEXITY 3 3/8/2023 Devan Salinas, PT Student GP 1          PT G-Codes  Outcome Measure Options: AM-PAC 6 Clicks Basic Mobility (PT)  AM-PAC 6 Clicks Score (PT): 23  PT Discharge Summary  Anticipated Discharge Disposition (PT): home with assist    Devan Salinas PT Student  3/8/2023

## 2023-03-08 NOTE — DISCHARGE SUMMARY
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  Apparently this was only a brief episode less than a minute.  There was no reported seizure activity no chewing of the tongue no loss of bladder bowel.  She does not think she hit her head but has some pain to the posterior shoulder and back area.  No chest pain or shortness of breath no recent fever chills sweats cough congestion vomiting or diarrhea.  She states she really had not eaten much since this morning she has been on her feet all day.  No recent black or bloody stool no leg pain or swelling recent long car ride plane or immobilization or foreign travels.  No reported headache slurred speech facial symmetry or paralysis    3/8/2023:  Patient confirms the HPI noted above including a syncopal episode which occurred while she was at work at approximately 1530 on 3/7/2023.  She notes she had been at her work as a  since approximately 9:00 with no abnormal symptoms preceding this event.  She does note that for approximate the past year she will have some NIETO generally been walking at least a moderate distances on level ground such as from the front to the rear of the store where she works as a .  She has been able to walk about the unit without significant recurrence of symptoms some chronic pain primarily in her back is noted at baseline which she reports is secondary to bony metastasis from previously diagnosed breast cancer.  She has noted some increased fatigue recently but notes her appetite has  been normal and confirms compliance with all of her outpatient medical therapy            Past Medical History:   Diagnosis Date   • Allergic    • Bone cancer (HCC)     METASTATIC BONE   • Bradycardia     SECONDARY TO ABLATION   • Breast cancer (HCC) 2017    mets to lymph nodes; did not do radiation   • Cancer of unknown origin (HCC)    • Compression fx, thoracic spine, open, initial encounter (HCC)    • Coronary artery disease    • COVID-19 2021   • Diabetes mellitus (HCC)    • Heart disease, unspecified    • Hepatitis C     RESOLVED WITH MEDICATION   • Hyperlipidemia    • Hypertension    • Obesity (BMI 30-39.9) 2021   • Rib fracture     Per patient   • Sleep apnea     no machine   • Tachycardia     ATRIAL   • Type 2 diabetes mellitus (HCC) 2017     Past Surgical History:   Procedure Laterality Date   • BACK SURGERY      neck X 2   • BREAST RECONSTRUCTION Bilateral    • CARDIAC ABLATION       atrial tachycardia x 5 ablations    • CARDIAC CATHETERIZATION     • CARDIAC SURGERY      stent placed in aorta   • CARDIAC SURGERY      6 surgeries as baby    • CERVICAL FUSION ANTERIOR WITH ARTIFICIAL DISCECTOMY IMPLANTATION N/A 2020    Procedure: C4 VERTEBRECTOMY AND ANTERIOR CERIVCAL DISCECTOMY WITH FUSION OF CERVICAL THREE THROUGH FIVE WITH REMOVAL OF HARDWARE C5-C6;  Surgeon: Mark Kaba MD;  Location: [facility] MAIN OR;  Service: Neurosurgery;  Laterality: N/A;   •  SECTION      x2   • COLONOSCOPY N/A 10/23/2020    Procedure: COLONOSCOPY WITH POLYPECTOMY X6;  Surgeon: Stanley Bourne MD;  Location: [facility] ENDOSCOPY;  Service: Gastroenterology;  Laterality: N/A;  POLYPS, INTERNAL HEMORRHOIDS   • ENDOMETRIAL ABLATION      REMOVAL SCAR TISSUE UTERINE   • ENDOSCOPY N/A 10/23/2020    Procedure: ESOPHAGOGASTRODUODENOSCOPY WITH BIOPSY X 1 AREA;  Surgeon: Stanley Bourne MD;  Location: [facility] ENDOSCOPY;  Service: Gastroenterology;  Laterality: N/A;  GASTRITIS, ESOPHAGITIS,  HIATAL HERNIA   • KNEE SURGERY  2000   • MASTECTOMY Bilateral    • NECK SURGERY     • PACEMAKER IMPLANTATION     • PAIN PUMP INSERTION/REVISION N/A 4/5/2022    Procedure: PAIN PUMP INSERTION AND INTRATHECAL CATHETER PLACEMENT;  Surgeon: Chuck Hunter MD;  Location: [facility] MAIN OR;  Service: Pain Management;  Laterality: N/A;     Family History   Problem Relation Age of Onset   • Heart disease Mother    • Stroke Mother    • Lung cancer Mother    • Aneurysm Father    • Diabetes Sister    • No Known Problems Brother    • No Known Problems Brother    • Diabetes type I Half-Sister    • Thyroid cancer Half-Sister    • Cancer Maternal Aunt    • Heart attack Sister    • Thyroid disease Sister    • No Known Problems Sister      Social History     Tobacco Use   • Smoking status: Never     Passive exposure: Never   • Smokeless tobacco: Never   Vaping Use   • Vaping Use: Never used   Substance Use Topics   • Alcohol use: Yes     Alcohol/week: 1.0 standard drink     Types: 1 Glasses of wine per week     Comment: mixed drink once a week   • Drug use: Not Currently     Medications Prior to Admission   Medication Sig Dispense Refill Last Dose   • acetaminophen (TYLENOL) 650 MG 8 hr tablet Take 1 tablet by mouth As Needed for Mild Pain. TAKES BETWEEN PAIN MEDS   3/7/2023   • aspirin 81 MG chewable tablet Chew 1 tablet Daily. 30 tablet 1 Past Month   • Calcium Carbonate-Vit D-Min (Calcium 600+D Plus Minerals) 600-400 MG-UNIT chewable tablet Chew 600 mg 2 (Two) Times a Day. 60 each 6 3/6/2023   • ibuprofen (ADVIL,MOTRIN) 400 MG tablet Take 1 tablet by mouth As Needed for Mild Pain. IN BETWEEN PAIN  MEDS   3/7/2023   • insulin NPH-insulin regular (humuLIN 70/30,novoLIN 70/30) (70-30) 100 UNIT/ML injection Inject 40 Units under the skin into the appropriate area as directed Every Morning.   3/7/2023   • insulin NPH-insulin regular (humuLIN 70/30,novoLIN 70/30) (70-30) 100 UNIT/ML injection Inject 30 Units under the skin into the  "appropriate area as directed Every Evening.   3/6/2023   • Blood Glucose Monitoring Suppl (Accu-Chek Araceli) device Use as instructed   To test blood sugar bid 1 each 0    • glucose blood (Accu-Chek Araceli Plus) test strip Use as instructed   To test bid 200 each 3    • Insulin Pen Needle (B-D UF III MINI PEN NEEDLES) 31G X 5 MM misc Use to inject insulin twice daily   DX: E11.9 100 each 0    • Insulin Syringe-Needle U-100 28G X 1/2\" 1 ML misc 1 each 2 (Two) Times a Day. 100 each 6    • Lancets (accu-chek soft touch) lancets Test bid 200 each 3    • lisinopril (PRINIVIL,ZESTRIL) 20 MG tablet Take 1 tablet by mouth Daily.   More than a month   • rosuvastatin (Crestor) 20 MG tablet Take 1 tablet by mouth Every Night. 90 tablet 0 More than a month     Allergies:  Promethazine and Tape    Immunization History   Administered Date(s) Administered   • Tdap 02/25/2022           REVIEW OF SYSTEMS:    Review of Systems   Constitutional: Positive for malaise/fatigue.   HENT: Negative.    Eyes: Negative.    Cardiovascular: Positive for dyspnea on exertion and syncope.   Respiratory: Negative.    Skin: Negative.    Musculoskeletal: Positive for back pain.   Gastrointestinal: Negative.    Genitourinary: Negative.    Psychiatric/Behavioral: Negative.          Vital Signs  Temp:  [97.4 °F (36.3 °C)-97.8 °F (36.6 °C)] 97.6 °F (36.4 °C)  Heart Rate:  [68-77] 68  Resp:  [15-18] 16  BP: (121-174)/(73-88) 144/86          Physical Exam:  Physical Exam  Vitals reviewed.   Constitutional:       General: She is not in acute distress.     Appearance: Normal appearance. She is normal weight. She is not ill-appearing, toxic-appearing or diaphoretic.   HENT:      Head: Normocephalic.      Right Ear: External ear normal.      Left Ear: External ear normal.      Nose: Nose normal.      Mouth/Throat:      Mouth: Mucous membranes are moist.   Eyes:      Extraocular Movements: Extraocular movements intact.   Cardiovascular:      Rate and Rhythm: " Normal rate and regular rhythm.      Pulses: Normal pulses.   Pulmonary:      Effort: Pulmonary effort is normal.      Breath sounds: Normal breath sounds.   Abdominal:      General: Bowel sounds are normal.      Palpations: Abdomen is soft.   Musculoskeletal:      Cervical back: Normal range of motion.      Right lower leg: No edema.      Left lower leg: No edema.   Skin:     General: Skin is warm and dry.      Capillary Refill: Capillary refill takes less than 2 seconds.   Neurological:      General: No focal deficit present.      Mental Status: She is alert.   Psychiatric:         Mood and Affect: Mood normal.         Behavior: Behavior normal.         Thought Content: Thought content normal.         Judgment: Judgment normal.           Emotional Behavior:   Normal   Debilities:  None  Results Review:    I reviewed the patient's new clinical results.  Lab Results (most recent)     Procedure Component Value Units Date/Time    POC Glucose Once [[id number]]  (Abnormal) Collected: 03/08/23 0834    Specimen: Blood Updated: 03/08/23 0835     Glucose 124 mg/dL      Comment: Serial Number: [id number]:  [id number]       Basic Metabolic Panel [[id number]]  (Abnormal) Collected: 03/08/23 0535    Specimen: Blood Updated: 03/08/23 0625     Glucose 133 mg/dL      BUN 16 mg/dL      Creatinine 0.75 mg/dL      Sodium 140 mmol/L      Potassium 3.8 mmol/L      Chloride 105 mmol/L      CO2 24.0 mmol/L      Calcium 9.2 mg/dL      BUN/Creatinine Ratio 21.3     Anion Gap 11.0 mmol/L      eGFR 91.3 mL/min/1.73     Narrative:      GFR Normal >60  Chronic Kidney Disease <60  Kidney Failure <15      High Sensitivity Troponin T [[id number]]  (Abnormal) Collected: 03/08/23 0535    Specimen: Blood Updated: 03/08/23 0625     HS Troponin T 13 ng/L     Narrative:      High Sensitive Troponin T Reference Range:  <10.0 ng/L- Negative Female for AMI  <15.0 ng/L- Negative Male for AMI  >=10 - Abnormal Female indicating possible myocardial  injury.  >=15 - Abnormal Male indicating possible myocardial injury.   Clinicians would have to utilize clinical acumen, EKG, Troponin, and serial changes to determine if it is an Acute Myocardial Infarction or myocardial injury due to an underlying chronic condition.         CBC (No Diff) [[id number]]  (Abnormal) Collected: 03/08/23 0535    Specimen: Blood Updated: 03/08/23 0544     WBC 4.70 10*3/mm3      RBC 4.73 10*6/mm3      Hemoglobin 13.3 g/dL      Hematocrit 40.3 %      MCV 85.4 fL      MCH 28.1 pg      MCHC 32.9 g/dL      RDW 13.8 %      RDW-SD 43.8 fl      MPV 7.6 fL      Platelets 133 10*3/mm3     High Sensitivity Troponin T 2Hr [[id number]]  (Abnormal) Collected: 03/07/23 2119    Specimen: Blood from Arm, Right Updated: 03/07/23 2148     HS Troponin T 17 ng/L      Troponin T Delta 3 ng/L     Narrative:      High Sensitive Troponin T Reference Range:  <10.0 ng/L- Negative Female for AMI  <15.0 ng/L- Negative Male for AMI  >=10 - Abnormal Female indicating possible myocardial injury.  >=15 - Abnormal Male indicating possible myocardial injury.   Clinicians would have to utilize clinical acumen, EKG, Troponin, and serial changes to determine if it is an Acute Myocardial Infarction or myocardial injury due to an underlying chronic condition.         Comprehensive Metabolic Panel [[id number]]  (Abnormal) Collected: 03/07/23 1620    Specimen: Blood Updated: 03/07/23 1653     Glucose 104 mg/dL      BUN 12 mg/dL      Creatinine 0.76 mg/dL      Sodium 139 mmol/L      Potassium 4.9 mmol/L      Comment: Slight hemolysis detected by analyzer. Results may be affected.        Chloride 101 mmol/L      CO2 25.0 mmol/L      Calcium 10.6 mg/dL      Total Protein 7.9 g/dL      Albumin 4.5 g/dL      ALT (SGPT) 18 U/L      AST (SGOT) 28 U/L      Alkaline Phosphatase 94 U/L      Total Bilirubin 0.6 mg/dL      Globulin 3.4 gm/dL      A/G Ratio 1.3 g/dL      BUN/Creatinine Ratio 15.8     Anion Gap 13.0 mmol/L      eGFR 89.8  mL/min/1.73     Narrative:      GFR Normal >60  Chronic Kidney Disease <60  Kidney Failure <15      High Sensitivity Troponin T [[id number]]  (Abnormal) Collected: 03/07/23 1620    Specimen: Blood Updated: 03/07/23 1653     HS Troponin T 14 ng/L     Narrative:      High Sensitive Troponin T Reference Range:  <10.0 ng/L- Negative Female for AMI  <15.0 ng/L- Negative Male for AMI  >=10 - Abnormal Female indicating possible myocardial injury.  >=15 - Abnormal Male indicating possible myocardial injury.   Clinicians would have to utilize clinical acumen, EKG, Troponin, and serial changes to determine if it is an Acute Myocardial Infarction or myocardial injury due to an underlying chronic condition.         CK [[id number]]  (Normal) Collected: 03/07/23 1620    Specimen: Blood Updated: 03/07/23 1653     Creatine Kinase 98 U/L     Magnesium [[id number]]  (Normal) Collected: 03/07/23 1620    Specimen: Blood Updated: 03/07/23 1653     Magnesium 2.2 mg/dL     CBC & Differential [[id number]]  (Normal) Collected: 03/07/23 1620    Specimen: Blood Updated: 03/07/23 1630    Narrative:      The following orders were created for panel order CBC & Differential.  Procedure                               Abnormality         Status                     ---------                               -----------         ------                     CBC Auto Differential[[id number]]        Normal              Final result                 Please view results for these tests on the individual orders.    CBC Auto Differential [[id number]]  (Normal) Collected: 03/07/23 1620    Specimen: Blood Updated: 03/07/23 1630     WBC 6.40 10*3/mm3      RBC 4.94 10*6/mm3      Hemoglobin 13.7 g/dL      Hematocrit 43.0 %      MCV 87.1 fL      MCH 27.7 pg      MCHC 31.8 g/dL      RDW 14.0 %      RDW-SD 42.9 fl      MPV 7.7 fL      Platelets 142 10*3/mm3      Neutrophil % 64.9 %      Lymphocyte % 25.5 %      Monocyte % 7.9 %      Eosinophil % 1.3 %      Basophil % 0.4 %       Neutrophils, Absolute 4.20 10*3/mm3      Lymphocytes, Absolute 1.60 10*3/mm3      Monocytes, Absolute 0.50 10*3/mm3      Eosinophils, Absolute 0.10 10*3/mm3      Basophils, Absolute 0.00 10*3/mm3      nRBC 0.0 /100 WBC           Imaging Results (Most Recent)     Procedure Component Value Units Date/Time    XR Spine Lumbar Complete 4+VW [[id number]] Collected: 03/07/23 1831     Updated: 03/07/23 1837    Narrative:      XR SPINE LUMBAR COMPLETE 4+VW    Date of Exam: 3/7/2023 5:06 PM EST    Indication: Trauma.    Comparison: CT lumbar spine 7/20/2020    Findings:    *No acute fractures or dislocations.  *Normal alignment.  *No osseous destructive lesions.  *Multilevel spondylosis.  *Generalized osteopenia.  *Unchanged anterior compression wedge deformity involving T12 vertebral body.  *      Impression:      1.No acute fractures or dislocations.   2.Multilevel spondylosis.  3.Generalized osteopenia.  4.Unchanged anterior compression wedge deformity involving T12 vertebral body.         Electronically Signed: Placido Moran    3/7/2023 6:35 PM EST    Workstation ID: [id number]    CT Head Without Contrast [[id number]] Collected: 03/07/23 1812     Updated: 03/07/23 1823    Narrative:      CT HEAD WO CONTRAST, CT CERVICAL SPINE WO CONTRAST    Date of Exam: 3/7/2023 5:31 PM EST    Indication: Syncope headache head injury.    Comparison: 7/26/2021    Technique: Axial CT images were obtained of the head without contrast administration.  Sagittal and coronal reconstructions were performed.  Automated exposure control and iterative reconstruction methods were used.     Findings:    CT BRAIN:  *No acute intracranial hemorrhage.  *No masses, mass effect, midline shift or hydrocephalus.  *White matter is intact.  *Calvarium is intact.  *Visualized orbits and globes are unremarkable without radiopaque foreign bodies.  *Visualized paranasal sinuses are clear.  *Visualized mastoid air cells are clear.    CT CERVICAL SPINE:  *No acute  fractures or dislocations.  *Severe levoscoliosis at the junction of cervical and thoracic spine..  *No prevertebral soft tissue swelling.  *No osseous destructive lesions.  *Multilevel spondylosis.  *Severe generalized osteopenia.  *Anterior spinal fusion hardware from C3 to C6.        Impression:      1.No acute intracranial hemorrhage. Calvarium is intact.   2.No acute fractures or dislocations of cervical spine. Multilevel spondylosis. Osteopenia. Anterior spinal fusion hardware.        Electronically Signed: Placido Moran    3/7/2023 6:21 PM EST    Workstation ID: [id number]    CT Cervical Spine Without Contrast [[id number]] Collected: 03/07/23 1812     Updated: 03/07/23 1823    Narrative:      CT HEAD WO CONTRAST, CT CERVICAL SPINE WO CONTRAST    Date of Exam: 3/7/2023 5:31 PM EST    Indication: Syncope headache head injury.    Comparison: 7/26/2021    Technique: Axial CT images were obtained of the head without contrast administration.  Sagittal and coronal reconstructions were performed.  Automated exposure control and iterative reconstruction methods were used.     Findings:    CT BRAIN:  *No acute intracranial hemorrhage.  *No masses, mass effect, midline shift or hydrocephalus.  *White matter is intact.  *Calvarium is intact.  *Visualized orbits and globes are unremarkable without radiopaque foreign bodies.  *Visualized paranasal sinuses are clear.  *Visualized mastoid air cells are clear.    CT CERVICAL SPINE:  *No acute fractures or dislocations.  *Severe levoscoliosis at the junction of cervical and thoracic spine..  *No prevertebral soft tissue swelling.  *No osseous destructive lesions.  *Multilevel spondylosis.  *Severe generalized osteopenia.  *Anterior spinal fusion hardware from C3 to C6.        Impression:      1.No acute intracranial hemorrhage. Calvarium is intact.   2.No acute fractures or dislocations of cervical spine. Multilevel spondylosis. Osteopenia. Anterior spinal fusion  hardware.        Electronically Signed: Placido Moran    3/7/2023 6:21 PM EST    Workstation ID: [id number]    XR Chest 2 View [[id number]] Collected: 03/07/23 1732     Updated: 03/07/23 1754    Narrative:      XR CHEST 2 VW    Date of Exam: 3/7/2023 5:04 PM EST    Indication: Syncope.    Comparison: AP chest x-ray 2/9/2023, CT chest 10/31/2022    Findings:  Cardiac silhouette remains enlarged. Pacemaker leads are stable. Sternal wires are again noted, with the inferior sternal wire remaining fractured. There are diffusely increased airspace opacities in both lungs. No pneumothorax or large pleural effusion   is seen.      Impression:      Impression:  1.Diffusely increased airspace opacities in both lungs, likely due to pulmonary edema.  2.Stable enlarged cardiac silhouette.    Electronically Signed: Dione Castillo    3/7/2023 5:52 PM EST    Workstation ID: [id number]    XR Shoulder 2+ View Left [[id number]] Collected: 03/07/23 1721     Updated: 03/07/23 1726    Narrative:      XR SHOULDER 2+ VW LEFT, XR SCAPULA LEFT    Date of Exam: 3/7/2023 5:05 PM EST    Indication: Syncope left shoulder pain left scapular pain.    Comparison: None available.    Findings:  Study limited by overlying support and monitoring apparatus. Study also limited by left subclavian approach pacemaker.    Left shoulder demonstrates no acute fracture or dislocation. The AC joint is grossly unremarkable in appearance. Evaluation of the glenohumeral joint is limited. Limited imaging of the chest demonstrate a remote rib fractures. No obvious pneumothorax   noted.    Dedicated imaging of the scapula demonstrates no acute osseous abnormality.      Impression:      Impression:  Limited study secondary to body habitus and overlying support apparatus and pacemaker.    No definite acute osseous abnormality noted limitations of exam    Electronically Signed: Minesh Maya    3/7/2023 5:24 PM EST    Workstation ID: [id number]    XR Scapula Left [[id number]]  Collected: 03/07/23 1721     Updated: 03/07/23 1726    Narrative:      XR SHOULDER 2+ VW LEFT, XR SCAPULA LEFT    Date of Exam: 3/7/2023 5:05 PM EST    Indication: Syncope left shoulder pain left scapular pain.    Comparison: None available.    Findings:  Study limited by overlying support and monitoring apparatus. Study also limited by left subclavian approach pacemaker.    Left shoulder demonstrates no acute fracture or dislocation. The AC joint is grossly unremarkable in appearance. Evaluation of the glenohumeral joint is limited. Limited imaging of the chest demonstrate a remote rib fractures. No obvious pneumothorax   noted.    Dedicated imaging of the scapula demonstrates no acute osseous abnormality.      Impression:      Impression:  Limited study secondary to body habitus and overlying support apparatus and pacemaker.    No definite acute osseous abnormality noted limitations of exam    Electronically Signed: Minesh Maya    3/7/2023 5:24 PM EST    Workstation ID: [id number]    XR Spine Thoracic 3 View [[id number]] Collected: 03/07/23 1717     Updated: 03/07/23 1725    Narrative:      XR SPINE THORACIC 3 VW    Date of Exam: 3/7/2023 5:06 PM EST    Indication: Trauma.    Comparison: 2/4/2021    Findings:  As below.      Impression:      Impression:  *Unchanged mild dextroscoliosis of the thoracic spine.  *Multilevel spondylosis.  *Evaluation for compression fractures is limited due to technique and overlying soft tissues. Recommend CT for further evaluation and to exclude thoracic spine fractures.  *Generalized osteopenia.    Electronically Signed: Placido Moran    3/7/2023 5:22 PM EST    Workstation ID: [id number]        reviewed    ECG/EMG Results (most recent)     Procedure Component Value Units Date/Time    ECG 12 Lead Syncope [[id number]] Collected: 03/07/23 1536     Updated: 03/07/23 1537    Narrative:      HEART RATE= 73  bpm  RR Interval= 821  ms  ID Interval= 126  ms  P Horizontal Axis=   deg  P Front  Axis= -90  deg  QRSD Interval= 196  ms  QT Interval=   ms  QRS Axis= 259  deg  T Wave Axis= -26  deg  - ABNORMAL ECG -  Atrial-ventricular dual-paced rhythm  When compared with ECG of 09-Feb-2023 12:01:17,  No significant change  Electronically Signed By:   Date and Time of Study: 2023-03-07 15:36:47    SCANNED - TELEMETRY   [[id number]] Resulted: 03/07/23     Updated: 03/08/23 1148    SCANNED - TELEMETRY   [[id number]] Resulted: 03/07/23     Updated: 03/08/23 1156    SCANNED - TELEMETRY   [[id number]] Resulted: 03/07/23     Updated: 03/08/23 1156    Adult Transthoracic Echo Complete W/ Cont if Necessary Per Protocol [[id number]] Resulted: 03/08/23 1717     Updated: 03/08/23 1717     Target HR (85%) 136 bpm      Max. Pred. HR (100%) 160 bpm         reviewed        Results for orders placed during the hospital encounter of 05/19/22    Adult Transthoracic Echo Complete W/ Cont if Necessary Per Protocol    Interpretation Summary  · Left ventricular ejection fraction appears to be 51 - 55%.  · The right ventricular cavity is moderately dilated.  · Moderate tricuspid valve regurgitation is present.  · Mild dilation of the aortic root is present.  · No pericardial effusion noted      Microbiology Results (last 10 days)     ** No results found for the last 240 hours. **          Assessment & Plan     Syncope, unspecified syncope type       Syncope  -Serial troponins: 14, 17, 13  -CBC and CMP generally unremarkable  -Chest x-ray showed diffusely increased airspace opacities in both lungs possibly due to pulmonary edema with an enlarged cardiac silhouette noted  -X-ray of left scapula showed no definite acute osseous abnormality  -X-ray of left shoulder showed no definite acute osseous abnormality  -X-ray of lumbar and thoracic spine showed no acute abnormalities  -Pacemaker interrogation ordered in ED  -Cardiology consulted in ED  -Continue telemetry    Hypertension  -Moderately controlled   BP Readings from Last 1 Encounters:    03/08/23 144/86   - Continue lisinopril  - Monitor while admitted    Diabetes mellitus  -Well controlled   Lab Results   Component Value Date    GLUCOSE 133 (H) 03/08/2023    GLUCOSE 104 (H) 03/07/2023    GLUCOSE 74 02/09/2023    GLUCOSE 95 01/06/2023   -Basal and correctional insulin ordered  -Diabetic diet  -Monitor AC and HS    Hyperlipidemia  -Statin        I discussed the patients findings and my recommendations with patient and family.     Discharge Diagnosis:      Syncope, unspecified syncope type      Hospital Course  Patient is a 62 y.o. female presented with following a syncopal episode with an HPI noted above.  Serial troponins were assessed and found to be 14, 17, 13 with CBC and CMP being generally unremarkable.  Chest x-ray showed diffusely increased airspace opacities in both lungs reported as possibly due to pulmonary edema with an enlarged cardiac silhouette noted.  X-ray of left scapula, shoulder, lumbar and thoracic spine showed no acute abnormalities.  Pacemaker interrogation was ordered in the ED and was unremarkable.  Cardiology was also consulted in the ED who recommended echocardiogram as well as gentle fluid bolus and orthostatic vital signs with outpatient follow-up.  At this time patient felt to be in good condition for discharge with close follow-up with her PCP as well as cardiology on an outpatient basis.  Her full testing/results and plan were discussed with patient and with concerning/alarm symptoms for which to call 911/return to the ED.  All questions were answered and she verbalizes her understanding and agreement.    Past Medical History:     Past Medical History:   Diagnosis Date   • Allergic    • Bone cancer (HCC)     METASTATIC BONE   • Bradycardia     SECONDARY TO ABLATION   • Breast cancer (HCC) 2017    mets to lymph nodes; did not do radiation   • Cancer of unknown origin (HCC)    • Compression fx, thoracic spine, open, initial encounter ([facility])    • Coronary artery disease     • COVID-19 2021   • Diabetes mellitus (HCC)    • Heart disease, unspecified    • Hepatitis C     RESOLVED WITH MEDICATION   • Hyperlipidemia    • Hypertension    • Obesity (BMI 30-39.9) 2021   • Rib fracture     Per patient   • Sleep apnea     no machine   • Tachycardia     ATRIAL   • Type 2 diabetes mellitus (HCC) 2017       Past Surgical History:     Past Surgical History:   Procedure Laterality Date   • BACK SURGERY      neck X 2   • BREAST RECONSTRUCTION Bilateral    • CARDIAC ABLATION       atrial tachycardia x 5 ablations    • CARDIAC CATHETERIZATION     • CARDIAC SURGERY      stent placed in aorta   • CARDIAC SURGERY      6 surgeries as baby    • CERVICAL FUSION ANTERIOR WITH ARTIFICIAL DISCECTOMY IMPLANTATION N/A 2020    Procedure: C4 VERTEBRECTOMY AND ANTERIOR CERIVCAL DISCECTOMY WITH FUSION OF CERVICAL THREE THROUGH FIVE WITH REMOVAL OF HARDWARE C5-C6;  Surgeon: Mark Kaba MD;  Location: [facility] MAIN OR;  Service: Neurosurgery;  Laterality: N/A;   •  SECTION      x2   • COLONOSCOPY N/A 10/23/2020    Procedure: COLONOSCOPY WITH POLYPECTOMY X6;  Surgeon: Stanley Bourne MD;  Location: [facility] ENDOSCOPY;  Service: Gastroenterology;  Laterality: N/A;  POLYPS, INTERNAL HEMORRHOIDS   • ENDOMETRIAL ABLATION      REMOVAL SCAR TISSUE UTERINE   • ENDOSCOPY N/A 10/23/2020    Procedure: ESOPHAGOGASTRODUODENOSCOPY WITH BIOPSY X 1 AREA;  Surgeon: Stanley Bourne MD;  Location: [facility] ENDOSCOPY;  Service: Gastroenterology;  Laterality: N/A;  GASTRITIS, ESOPHAGITIS, HIATAL HERNIA   • KNEE SURGERY     • MASTECTOMY Bilateral    • NECK SURGERY     • PACEMAKER IMPLANTATION     • PAIN PUMP INSERTION/REVISION N/A 2022    Procedure: PAIN PUMP INSERTION AND INTRATHECAL CATHETER PLACEMENT;  Surgeon: Chuck Hunter MD;  Location: [facility] MAIN OR;  Service: Pain Management;  Laterality: N/A;       Social History:   Social History     Socioeconomic History   •  "Marital status:    • Number of children: 2   Tobacco Use   • Smoking status: Never     Passive exposure: Never   • Smokeless tobacco: Never   Vaping Use   • Vaping Use: Never used   Substance and Sexual Activity   • Alcohol use: Yes     Alcohol/week: 1.0 standard drink     Types: 1 Glasses of wine per week     Comment: mixed drink once a week   • Drug use: Not Currently   • Sexual activity: Defer       Procedures Performed         Consults:   Consults     Date and Time Order Name Status Description    3/7/2023 11:18 PM Inpatient Cardiology Consult Completed           Condition on Discharge:     Stable    Discharge Disposition      Discharge Medications     Discharge Medications      Changes to Medications      Instructions Start Date   Calcium 600+D Plus Minerals 600-400 MG-UNIT chewable tablet  What changed: additional instructions   600 mg, Oral, 2 Times Daily         Continue These Medications      Instructions Start Date   Accu-Chek Araceli device   Use as instructed   To test blood sugar bid      Accu-Chek Araceli Plus test strip  Generic drug: glucose blood   Use as instructed   To test bid      accu-chek soft touch lancets   Test bid      acetaminophen 650 MG 8 hr tablet  Commonly known as: TYLENOL   650 mg, Oral, As Needed, TAKES BETWEEN PAIN MEDS      aspirin 81 MG chewable tablet   81 mg, Oral, Daily      B-D UF III MINI PEN NEEDLES 31G X 5 MM misc  Generic drug: Insulin Pen Needle   Use to inject insulin twice daily   DX: E11.9      ibuprofen 400 MG tablet  Commonly known as: ADVIL,MOTRIN   400 mg, Oral, As Needed, IN BETWEEN PAIN  MEDS      insulin NPH-insulin regular (70-30) 100 UNIT/ML injection  Commonly known as: humuLIN 70/30,novoLIN 70/30   40 Units, Subcutaneous, Every Morning      insulin NPH-insulin regular (70-30) 100 UNIT/ML injection  Commonly known as: humuLIN 70/30,novoLIN 70/30   30 Units, Subcutaneous, Every Evening      Insulin Syringe-Needle U-100 28G X 1/2\" 1 ML misc   1 each, Does " not apply, 2 Times Daily      lisinopril 20 MG tablet  Commonly known as: PRINIVIL,ZESTRIL   20 mg, Oral, Daily      rosuvastatin 20 MG tablet  Commonly known as: Crestor   20 mg, Oral, Nightly             Discharge Diet:     Activity at Discharge:     Follow-up Appointments  Future Appointments   Date Time Provider Department Center   4/3/2023  1:45 PM Stanley Lopez MD [facility]   4/10/2023  1:15 PM [facility] None   4/14/2023  8:45 AM Chirag Robles DO [facility]     Additional Instructions for the Follow-ups that You Need to Schedule     Discharge Follow-up with PCP   As directed       Currently Documented PCP:    Chirag Robles DO    PCP Phone Number:    [phone]     Follow Up Details: 5 to 7 days         Discharge Follow-up with Specified Provider: Cardiology   As directed      To: Cardiology               Test Results Pending at Discharge       Risk for Readmission (LACE) Score: 8 (3/8/2023  6:00 AM)      Greater than 30 minutes spent in discharge activities for this patient    Tanner Guaman PA-C  03/08/23  17:27 EST

## 2023-03-08 NOTE — ED PROVIDER NOTES
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  Apparently this was only a brief episode less than a minute.  There was no reported seizure activity no chewing of the tongue no loss of bladder bowel.  She does not think she hit her head but has some pain to the posterior shoulder and back area.  No chest pain or shortness of breath no recent fever chills sweats cough congestion vomiting or diarrhea.  She states she really had not eaten much since this morning she has been on her feet all day.  No recent black or bloody stool no leg pain or swelling recent long car ride plane or immobilization or foreign travels.  No reported headache slurred speech facial symmetry or paralysis        Review of Systems   Constitutional: Negative for chills and fever.   Respiratory: Negative for chest tightness and shortness of breath.    Cardiovascular: Negative for chest pain and palpitations.   Gastrointestinal: Negative for abdominal pain and vomiting.   Musculoskeletal: Positive for back pain. Negative for neck pain.   Skin: Negative for rash and wound.   Neurological: Positive for syncope. Negative for dizziness, facial asymmetry, speech difficulty, light-headedness and headaches.   Psychiatric/Behavioral: Negative for agitation and confusion.       Past Medical History:   Diagnosis Date   • Allergic    • Bone cancer (HCC)     METASTATIC BONE   • Bradycardia     SECONDARY TO ABLATION   • Breast cancer (HCC) 2017    mets to lymph nodes; did not do radiation   • Cancer of unknown origin (HCC)    • Compression fx, thoracic spine, open, initial encounter ([facility])    • Coronary artery disease    •  COVID-19 2021   • Diabetes mellitus (HCC)    • Heart disease, unspecified    • Hepatitis C     RESOLVED WITH MEDICATION   • Hyperlipidemia    • Hypertension    • Obesity (BMI 30-39.9) 2021   • Rib fracture     Per patient   • Sleep apnea     no machine   • Tachycardia     ATRIAL   • Type 2 diabetes mellitus (HCC) 2017       Allergies   Allergen Reactions   • Promethazine Other (See Comments)     Hyperactive mean   • Tape Other (See Comments)     .blisters         Past Surgical History:   Procedure Laterality Date   • BACK SURGERY      neck X 2   • BREAST RECONSTRUCTION Bilateral    • CARDIAC ABLATION       atrial tachycardia x 5 ablations    • CARDIAC CATHETERIZATION     • CARDIAC SURGERY      stent placed in aorta   • CARDIAC SURGERY      6 surgeries as baby    • CERVICAL FUSION ANTERIOR WITH ARTIFICIAL DISCECTOMY IMPLANTATION N/A 2020    Procedure: C4 VERTEBRECTOMY AND ANTERIOR CERIVCAL DISCECTOMY WITH FUSION OF CERVICAL THREE THROUGH FIVE WITH REMOVAL OF HARDWARE C5-C6;  Surgeon: Mark Kaba MD;  Location: [facility] MAIN OR;  Service: Neurosurgery;  Laterality: N/A;   •  SECTION      x2   • COLONOSCOPY N/A 10/23/2020    Procedure: COLONOSCOPY WITH POLYPECTOMY X6;  Surgeon: Stanley Bourne MD;  Location: [facility] ENDOSCOPY;  Service: Gastroenterology;  Laterality: N/A;  POLYPS, INTERNAL HEMORRHOIDS   • ENDOMETRIAL ABLATION      REMOVAL SCAR TISSUE UTERINE   • ENDOSCOPY N/A 10/23/2020    Procedure: ESOPHAGOGASTRODUODENOSCOPY WITH BIOPSY X 1 AREA;  Surgeon: Stanley Bourne MD;  Location: [facility] ENDOSCOPY;  Service: Gastroenterology;  Laterality: N/A;  GASTRITIS, ESOPHAGITIS, HIATAL HERNIA   • KNEE SURGERY     • MASTECTOMY Bilateral    • NECK SURGERY     • PACEMAKER IMPLANTATION     • PAIN PUMP INSERTION/REVISION N/A 2022    Procedure: PAIN PUMP INSERTION AND INTRATHECAL CATHETER PLACEMENT;  Surgeon: Chuck Hunter MD;  Location: [facility] MAIN OR;   Service: Pain Management;  Laterality: N/A;       Family History   Problem Relation Age of Onset   • Heart disease Mother    • Stroke Mother    • Lung cancer Mother    • Aneurysm Father    • Diabetes Sister    • No Known Problems Brother    • No Known Problems Brother    • Diabetes type I Half-Sister    • Thyroid cancer Half-Sister    • Cancer Maternal Aunt    • Heart attack Sister    • Thyroid disease Sister    • No Known Problems Sister        Social History     Socioeconomic History   • Marital status:    • Number of children: 2   Tobacco Use   • Smoking status: Never     Passive exposure: Never   • Smokeless tobacco: Never   Vaping Use   • Vaping Use: Never used   Substance and Sexual Activity   • Alcohol use: Yes     Alcohol/week: 1.0 standard drink     Types: 1 Glasses of wine per week     Comment: rare   • Drug use: Not Currently   • Sexual activity: Defer     Prior to Admission medications    Medication Sig Start Date End Date Taking? Authorizing Provider   acetaminophen (TYLENOL) 650 MG 8 hr tablet Take 650 mg by mouth As Needed for Mild Pain . TAKES BETWEEN PAIN MEDS    Provider, MD Magdalene   aspirin 81 MG chewable tablet Chew 1 tablet Daily. 12/24/19   Cedric Duval Jr., MD   azithromycin (Zithromax Z-Marcelino) 250 MG tablet Take 2 tablets by mouth on day 1, then 1 tablet daily on days 2-5 11/9/22   Chirag Robles DO   Blood Glucose Monitoring Suppl (Accu-Chek Araceli) device Use as instructed   To test blood sugar bid 10/25/21   Angelica Rodriguez MD   Calcium Carbonate-Vit D-Min (Calcium 600+D Plus Minerals) 600-400 MG-UNIT chewable tablet Chew 600 mg 2 (Two) Times a Day.  Patient taking differently: Chew 600 mg 2 (Two) Times a Day. GUMMIES 2/12/21   Мария Nunez MD   glucose blood (Accu-Chek Araceli Plus) test strip Use as instructed   To test bid 10/25/21   Angelica Rodriguez MD   ibuprofen (ADVIL,MOTRIN) 400 MG tablet Take 400 mg by mouth As Needed for Mild Pain . IN  "BETWEEN PAIN  MEDS    Magdalene Russell MD   insulin NPH-insulin regular (humuLIN 70/30,novoLIN 70/30) (70-30) 100 UNIT/ML injection Inject 40 Units under the skin into the appropriate area as directed Every Morning.    Magdalene Russell MD   insulin NPH-insulin regular (humuLIN 70/30,novoLIN 70/30) (70-30) 100 UNIT/ML injection Inject 30 Units under the skin into the appropriate area as directed Every Evening.    Magdalene Russell MD   Insulin Pen Needle (B-D UF III MINI PEN NEEDLES) 31G X 5 MM misc Use to inject insulin twice daily   DX: E11.9 8/22/22   Chirag Robles, DO   Insulin Syringe-Needle U-100 28G X 1/2\" 1 ML misc 1 each 2 (Two) Times a Day. 11/18/22   Chirag Robles, DO   Lancets (accu-chek soft touch) lancets Test bid 10/25/21   Angelica Rodriguez MD   lisinopril (PRINIVIL,ZESTRIL) 20 MG tablet Take 20 mg by mouth Daily.    Magdalene Russell MD   rosuvastatin (Crestor) 20 MG tablet Take 1 tablet by mouth Every Night. 3/9/21   Preethi Vasquez APRN           Objective   Physical Exam  Constitutional is a 62-year-old female awake alert no distress temperature was 98.2 initial blood pressure listed at 200/106 but recheck was 174/88 heart rate 77 respirations 20 heart rate 77.  HEENT no obvious trauma extraocular muscles are intact pupils equal round reactive no raccoon or barnes sign no photophobia no papilledema.  Back no direct cervical thoracic lumbar spine tenderness there is some paracervical thoracic and lumbar spine tenderness and tenderness of the left scapula but no posterior rib tenderness no step-off or deformity no crepitus or subcutaneous air.  Neck was supple full range of motion trachea midline no JVD no bruits.  Lungs clear no retractions heart regular without murmur.  Abdomen soft without tenderness good bowel sounds no peritoneal findings or pulsatile masses extremities pulses equal throughout upper and lower extremities no edema cords or Homans' sign " no evidence of DVT.  Skin warm and dry without rashes or cellulitic changes.  Neurologic awake alert and follows commands orientated x3 no facial asymmetry normal speech no drift the arms or legs without focal weak  Procedures           ED Course      Results for orders placed or performed during the hospital encounter of 03/07/23   Comprehensive Metabolic Panel    Specimen: Blood   Result Value Ref Range    Glucose 104 (H) 65 - 99 mg/dL    BUN 12 8 - 23 mg/dL    Creatinine 0.76 0.57 - 1.00 mg/dL    Sodium 139 136 - 145 mmol/L    Potassium 4.9 3.5 - 5.2 mmol/L    Chloride 101 98 - 107 mmol/L    CO2 25.0 22.0 - 29.0 mmol/L    Calcium 10.6 (H) 8.6 - 10.5 mg/dL    Total Protein 7.9 6.0 - 8.5 g/dL    Albumin 4.5 3.5 - 5.2 g/dL    ALT (SGPT) 18 1 - 33 U/L    AST (SGOT) 28 1 - 32 U/L    Alkaline Phosphatase 94 39 - 117 U/L    Total Bilirubin 0.6 0.0 - 1.2 mg/dL    Globulin 3.4 gm/dL    A/G Ratio 1.3 g/dL    BUN/Creatinine Ratio 15.8 7.0 - 25.0    Anion Gap 13.0 5.0 - 15.0 mmol/L    eGFR 89.8 >60.0 mL/min/1.73   High Sensitivity Troponin T    Specimen: Blood   Result Value Ref Range    HS Troponin T 14 (H) <10 ng/L   CK    Specimen: Blood   Result Value Ref Range    Creatine Kinase 98 20 - 180 U/L   Magnesium    Specimen: Blood   Result Value Ref Range    Magnesium 2.2 1.6 - 2.4 mg/dL   CBC Auto Differential    Specimen: Blood   Result Value Ref Range    WBC 6.40 3.40 - 10.80 10*3/mm3    RBC 4.94 3.77 - 5.28 10*6/mm3    Hemoglobin 13.7 12.0 - 15.9 g/dL    Hematocrit 43.0 34.0 - 46.6 %    MCV 87.1 79.0 - 97.0 fL    MCH 27.7 26.6 - 33.0 pg    MCHC 31.8 31.5 - 35.7 g/dL    RDW 14.0 12.3 - 15.4 %    RDW-SD 42.9 37.0 - 54.0 fl    MPV 7.7 6.0 - 12.0 fL    Platelets 142 140 - 450 10*3/mm3    Neutrophil % 64.9 42.7 - 76.0 %    Lymphocyte % 25.5 19.6 - 45.3 %    Monocyte % 7.9 5.0 - 12.0 %    Eosinophil % 1.3 0.3 - 6.2 %    Basophil % 0.4 0.0 - 1.5 %    Neutrophils, Absolute 4.20 1.70 - 7.00 10*3/mm3    Lymphocytes, Absolute 1.60  0.70 - 3.10 10*3/mm3    Monocytes, Absolute 0.50 0.10 - 0.90 10*3/mm3    Eosinophils, Absolute 0.10 0.00 - 0.40 10*3/mm3    Basophils, Absolute 0.00 0.00 - 0.20 10*3/mm3    nRBC 0.0 0.0 - 0.2 /100 WBC   High Sensitivity Troponin T 2Hr    Specimen: Arm, Right; Blood   Result Value Ref Range    HS Troponin T 17 (H) <10 ng/L    Troponin T Delta 3 >=-4 - <+4 ng/L     XR Chest 2 View    Result Date: 3/7/2023  Impression: 1.Diffusely increased airspace opacities in both lungs, likely due to pulmonary edema. 2.Stable enlarged cardiac silhouette. Electronically Signed: Dione Castillo  3/7/2023 5:52 PM EST  Workstation ID: [id number]    XR Spine Thoracic 3 View    Result Date: 3/7/2023  Impression: *Unchanged mild dextroscoliosis of the thoracic spine. *Multilevel spondylosis. *Evaluation for compression fractures is limited due to technique and overlying soft tissues. Recommend CT for further evaluation and to exclude thoracic spine fractures. *Generalized osteopenia. Electronically Signed: Placido Moran  3/7/2023 5:22 PM EST  Workstation ID: [id number]    XR Scapula Left    Result Date: 3/7/2023  Impression: Limited study secondary to body habitus and overlying support apparatus and pacemaker. No definite acute osseous abnormality noted limitations of exam Electronically Signed: Minesh Maya  3/7/2023 5:24 PM EST  Workstation ID: [id number]    XR Shoulder 2+ View Left    Result Date: 3/7/2023  Impression: Limited study secondary to body habitus and overlying support apparatus and pacemaker. No definite acute osseous abnormality noted limitations of exam Electronically Signed: Minesh Maya  3/7/2023 5:24 PM EST  Workstation ID: [id number]    CT Head Without Contrast    Result Date: 3/7/2023  1.No acute intracranial hemorrhage. Calvarium is intact. 2.No acute fractures or dislocations of cervical spine. Multilevel spondylosis. Osteopenia. Anterior spinal fusion hardware. Electronically Signed: Placido Ali  3/7/2023 6:21 PM EST   Workstation ID: [id number]    CT Cervical Spine Without Contrast    Result Date: 3/7/2023  1.No acute intracranial hemorrhage. Calvarium is intact. 2.No acute fractures or dislocations of cervical spine. Multilevel spondylosis. Osteopenia. Anterior spinal fusion hardware. Electronically Signed: Placido Moran  3/7/2023 6:21 PM EST  Workstation ID: [id number]    XR Spine Lumbar Complete 4+VW    Result Date: 3/7/2023  1.No acute fractures or dislocations. 2.Multilevel spondylosis. 3.Generalized osteopenia. 4.Unchanged anterior compression wedge deformity involving T12 vertebral body.  Electronically Signed: Placido Ali  3/7/2023 6:35 PM EST  Workstation ID: [id number]    Medications   sodium chloride 0.9 % flush 10 mL (has no administration in time range)          EKG my interpretation atrial ventricular dual paced rhythm at 73 abnormal EKG paced rhythm but unchanged from previous compared to 2/9/2023                                  Medical Decision Making  Medical decision making.  IV established placed on a cardiac monitor my review reveals a paced rhythm.  EKG my review shows an atrial ventricular dual paced rhythm at 73 is abnormal and unchanged compared to 2/9/2023.  Labs obtained and independently reviewed by me comprehensive metabolic panel file was CK magnesium and troponin was unremarkable other than troponin of 14 at 2-hour repeat was 17.  CBC was normal hemoglobin 13.7.  X-rays obtained independently by me chest x-ray no acute findings no pneumonia or pneumothorax on my review.  X-rays obtained throughout the lumbar spine and thoracic spine I do not see any acute fractures or subluxations.  Degenerative changes noted and some scoliosis and some old fractures but there is some osteopenia as well.  Radiology reports reviewed by me as well.  X-rays obtained of the shoulder and scapula I do not see fracture or dislocation.  CT head without on my review shows no hemorrhage or skull fractures.  Radiology review is also  unremarkable CT of the cervical spine on my review shows no fracture dislocation postoperative changes noted.  Radiology review shows no fractures or subluxation postoperative changes.  Patient repeat exam is unchanged she has had a bump in her troponin but only from 14-17 only a delta of 3.  She has no current chest pain or shortness of breath.  Blood pressure now 140/78 and heart rate in the 70s.  Paced rhythm.  Has had no further syncopal episodes no evidence on my exam of an acute stroke based on my history and physical findings and CT.  No evidence of intracerebral hemorrhage based on my CT and findings here.  I do not see any evidence of a pacemaker malfunction this was interrogated and [employer] reports that the pacemaker is functioning fine.  No evidence of acute intra-abdominal process such as an aneurysm or dissection.  No evidence just DVT or pulmonary embolism based on the history physical and clinical findings here.  This is not a complete list of all possibilities that can make patient have a syncopal episode.  We talked about the findings I talked her .  She be placed in observation consultation from her cardiologist in the morning and further work-up.  Serial troponins.  And cardiac monitoring.  Patient agreeable stable unremarkable ER course.    Syncope, unspecified syncope type: acute illness or injury  Amount and/or Complexity of Data Reviewed  Labs: ordered. Decision-making details documented in ED Course.  Radiology: ordered and independent interpretation performed. Decision-making details documented in ED Course.  ECG/medicine tests: ordered and independent interpretation performed. Decision-making details documented in ED Course.      Risk  Decision regarding hospitalization.          Final diagnoses:   Syncope, unspecified syncope type       ED Disposition  ED Disposition     ED Disposition   Decision to Admit    Condition   --    Comment   Level of Care: Observation Unit [28]    Diagnosis: Syncope, unspecified syncope type [[id number]]   Admitting Physician: MONO BARRERA [[id number]]   Attending Physician: MONO BARRERA [[id number]]               No follow-up provider specified.       Medication List      No changes were made to your prescriptions during this visit.          Mono Barrera MD  03/07/23 3663

## 2023-03-08 NOTE — ED NOTES
Nursing report ED to floor  Gladys Garrison  62 y.o.  female    HPI:   Chief Complaint   Patient presents with   • Syncope       Admitting doctor:   Christ Barrera MD    Admitting diagnosis:   The encounter diagnosis was Syncope, unspecified syncope type.    Code status:   Current Code Status     Date Active Code Status Order ID Comments User Context       Prior          Allergies:   Promethazine and Tape    Isolation:  No active isolations     Fall Risk:  Fall Risk Assessment was completed, and patient is at moderate risk for falls.   Predictive Model Details         6 (Low) Factor Value    Calculated 3/7/2023 22:05 Age 62    Risk of Fall Model Musculoskeletal Assessment WDL     Active Peripheral IV Present     Imaging order in this encounter Present     Respiratory Rate 20     Magnesium 2.2 mg/dL     Skin Assessment WDL     Financial Class Other     Drug Use No     Durga Scale not on file     Peripheral Vascular Assessment WDL     Diastolic BP 78     Chloride 101 mmol/L     Gastrointestinal Assessment WDL     Total Bilirubin 0.6 mg/dL     Cardiac Assessment WDL     Days after Admission 0.274     Potassium 4.9 mmol/L     ALT 18 U/L     Calcium 10.6 mg/dL     Albumin 4.5 g/dL     Creatinine 0.76 mg/dL         Weight:       03/07/23  1527   Weight: 63.5 kg (140 lb)       Intake and Output  No intake or output data in the 24 hours ending 03/07/23 2227    Diet:        Most recent vitals:   Vitals:    03/07/23 1528 03/07/23 1746 03/07/23 1901 03/07/23 2046   BP: (!) 200/106 174/88 164/88 140/78   BP Location: Left arm      Patient Position: Lying      Pulse: 77 70 69 72   Resp: 20      Temp: 98.2 °F (36.8 °C)      TempSrc: Oral      SpO2: 98% 97% 96% 96%   Weight:       Height:           Active LDAs/IV Access:   Lines, Drains & Airways     Active LDAs     Name Placement date Placement time Site Days    Peripheral IV 03/07/23 1620 Posterior;Right Hand 03/07/23  1620  Hand  less than 1                Skin Condition:    Skin Assessments (last day)     None           Labs (abnormal labs have a star):   Labs Reviewed   COMPREHENSIVE METABOLIC PANEL - Abnormal; Notable for the following components:       Result Value    Glucose 104 (*)     Calcium 10.6 (*)     All other components within normal limits    Narrative:     GFR Normal >60  Chronic Kidney Disease <60  Kidney Failure <15     TROPONIN - Abnormal; Notable for the following components:    HS Troponin T 14 (*)     All other components within normal limits    Narrative:     High Sensitive Troponin T Reference Range:  <10.0 ng/L- Negative Female for AMI  <15.0 ng/L- Negative Male for AMI  >=10 - Abnormal Female indicating possible myocardial injury.  >=15 - Abnormal Male indicating possible myocardial injury.   Clinicians would have to utilize clinical acumen, EKG, Troponin, and serial changes to determine if it is an Acute Myocardial Infarction or myocardial injury due to an underlying chronic condition.        HIGH SENSITIVITIY TROPONIN T 2HR - Abnormal; Notable for the following components:    HS Troponin T 17 (*)     All other components within normal limits    Narrative:     High Sensitive Troponin T Reference Range:  <10.0 ng/L- Negative Female for AMI  <15.0 ng/L- Negative Male for AMI  >=10 - Abnormal Female indicating possible myocardial injury.  >=15 - Abnormal Male indicating possible myocardial injury.   Clinicians would have to utilize clinical acumen, EKG, Troponin, and serial changes to determine if it is an Acute Myocardial Infarction or myocardial injury due to an underlying chronic condition.        CK - Normal   MAGNESIUM - Normal   CBC WITH AUTO DIFFERENTIAL - Normal   CBC AND DIFFERENTIAL    Narrative:     The following orders were created for panel order CBC & Differential.  Procedure                               Abnormality         Status                     ---------                               -----------         ------                     CBC Auto  Differential[[id number]]        Normal              Final result                 Please view results for these tests on the individual orders.       LOC: Person, Place, Time and Situation    Telemetry:  Observation Unit    Cardiac Monitoring Ordered: yes    EKG:   ECG 12 Lead Syncope   Preliminary Result   HEART RATE= 73  bpm   RR Interval= 821  ms   DE Interval= 126  ms   P Horizontal Axis=   deg   P Front Axis= -90  deg   QRSD Interval= 196  ms   QT Interval=   ms   QRS Axis= 259  deg   T Wave Axis= -26  deg   - ABNORMAL ECG -   Atrial-ventricular dual-paced rhythm   When compared with ECG of 09-Feb-2023 12:01:17,   No significant change   Electronically Signed By:    Date and Time of Study: 2023-03-07 15:36:47          Medications Given in the ED:   Medications   sodium chloride 0.9 % flush 10 mL (has no administration in time range)       Imaging results:  XR Chest 2 View    Result Date: 3/7/2023  Impression: 1.Diffusely increased airspace opacities in both lungs, likely due to pulmonary edema. 2.Stable enlarged cardiac silhouette. Electronically Signed: Dione Castillo  3/7/2023 5:52 PM EST  Workstation ID: [id number]    XR Spine Thoracic 3 View    Result Date: 3/7/2023  Impression: *Unchanged mild dextroscoliosis of the thoracic spine. *Multilevel spondylosis. *Evaluation for compression fractures is limited due to technique and overlying soft tissues. Recommend CT for further evaluation and to exclude thoracic spine fractures. *Generalized osteopenia. Electronically Signed: Placido Moran  3/7/2023 5:22 PM EST  Workstation ID: [id number]    XR Scapula Left    Result Date: 3/7/2023  Impression: Limited study secondary to body habitus and overlying support apparatus and pacemaker. No definite acute osseous abnormality noted limitations of exam Electronically Signed: Minesh Maya  3/7/2023 5:24 PM EST  Workstation ID: [id number]    XR Shoulder 2+ View Left    Result Date: 3/7/2023  Impression: Limited study secondary to  body habitus and overlying support apparatus and pacemaker. No definite acute osseous abnormality noted limitations of exam Electronically Signed: Minesh Maya  3/7/2023 5:24 PM EST  Workstation ID: [id number]    CT Head Without Contrast    Result Date: 3/7/2023  1.No acute intracranial hemorrhage. Calvarium is intact. 2.No acute fractures or dislocations of cervical spine. Multilevel spondylosis. Osteopenia. Anterior spinal fusion hardware. Electronically Signed: [employer]  3/7/2023 6:21 PM EST  Workstation ID: [id number]    CT Cervical Spine Without Contrast    Result Date: 3/7/2023  1.No acute intracranial hemorrhage. Calvarium is intact. 2.No acute fractures or dislocations of cervical spine. Multilevel spondylosis. Osteopenia. Anterior spinal fusion hardware. Electronically Signed: [employer]  3/7/2023 6:21 PM EST  Workstation ID: [id number]    XR Spine Lumbar Complete 4+VW    Result Date: 3/7/2023  1.No acute fractures or dislocations. 2.Multilevel spondylosis. 3.Generalized osteopenia. 4.Unchanged anterior compression wedge deformity involving T12 vertebral body.  Electronically Signed: [employer]  3/7/2023 6:35 PM EST  Workstation ID: [id number]      Social issues:   Social History     Socioeconomic History   • Marital status:    • Number of children: 2   Tobacco Use   • Smoking status: Never     Passive exposure: Never   • Smokeless tobacco: Never   Vaping Use   • Vaping Use: Never used   Substance and Sexual Activity   • Alcohol use: Yes     Alcohol/week: 1.0 standard drink     Types: 1 Glasses of wine per week     Comment: rare   • Drug use: Not Currently   • Sexual activity: Defer       NIH Stroke Scale:  Interval: (not recorded)  1a. Level of Consciousness: (not recorded)  1b. LOC Questions: (not recorded)  1c. LOC Commands: (not recorded)  2. Best Gaze: (not recorded)  3. Visual: (not recorded)  4. Facial Palsy: (not recorded)  5a. Motor Arm, Left: (not recorded)  5b. Motor Arm, Right: (not  recorded)  6a. Motor Leg, Left: (not recorded)  6b. Motor Leg, Right: (not recorded)  7. Limb Ataxia: (not recorded)  8. Sensory: (not recorded)  9. Best Language: (not recorded)  10. Dysarthria: (not recorded)  11. Extinction and Inattention (formerly Neglect): (not recorded)    Total (NIH Stroke Scale): (not recorded)     Additional notable assessment information     Nursing report ED to floor:  ALIREZA García RN   03/07/23 22:27 EST

## 2023-03-08 NOTE — CASE MANAGEMENT/SOCIAL WORK
Discharge Planning Assessment  [facility]     Patient Name: Gladys Garrison  MRN: [id number]  Today's Date: 3/8/2023    Admit Date: 3/7/2023    Plan: Home with spouse.   Discharge Needs Assessment     Row Name 03/08/23 1357       Living Environment    People in Home spouse    Current Living Arrangements home    Primary Care Provided by self    Provides Primary Care For no one    Family Caregiver if Needed spouse    Quality of Family Relationships helpful;involved;supportive    Able to Return to Prior Arrangements yes       Resource/Environmental Concerns    Resource/Environmental Concerns none    Transportation Concerns none       Transition Planning    Patient/Family Anticipates Transition to home with family    Patient/Family Anticipated Services at Transition none    Transportation Anticipated family or friend will provide       Discharge Needs Assessment    Readmission Within the Last 30 Days no previous admission in last 30 days    Equipment Currently Used at Home glucometer    Concerns to be Addressed no discharge needs identified;denies needs/concerns at this time    Anticipated Changes Related to Illness none    Equipment Needed After Discharge none               Discharge Plan     Row Name 03/08/23 2145       Plan    Plan Home with spouse.    Patient/Family in Agreement with Plan yes    Plan Comments Patient lives at home with spouse. Patient drives, spouse will provide transportation at discharge. Patient performs ADL. PCP and pharmacy confirmed. Denies financial assistance for medication and/or food. Denies HH and/or rehab needs.               Demographic Summary     Row Name 03/08/23 7495       General Information    Admission Type observation    Arrived From emergency department    Required Notices Provided Observation Status Notice    Referral Source admission list    Reason for Consult discharge planning    Preferred Language English               Functional Status     Row Name 03/08/23 3308        Functional Status    Usual Activity Tolerance good    Current Activity Tolerance good       Functional Status, IADL    Medications independent    Meal Preparation independent    Housekeeping independent    Laundry independent    Shopping independent            Met with patient in room wearing PPE: mask..      Maintained distance greater than six feet and spent less than 15 minutes in the room.              Nancy Alvarenga RN

## 2023-03-08 NOTE — PLAN OF CARE
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 States she had not eaten all day when she collapsed on the floor at the cash register around 3pm. PMH includes CAD, metastatic breast cancer, DM II, and pacemaker placement. Prior to incident, pt was living with spouse in single-level home with spouse, brother-in-law, and grandson. She was IND with household mobility, community ambulation, ADLs, and working part-time at local grocery store. Currently she is able to complete bed mobility mod I, transfers CGA, and amb 50' CGA>SBA with RW. Vitals remained normal throughout session. Pt was waiting to be seen by cardiology to determine any other CV issues. Pt will not req any skilled PT intervention until d/c and will be safe to return back home with assist from . No DME necessary.

## 2023-03-08 NOTE — CONSULTS
Cardiology consult note  Stanley Lopez MD, PhD      Patient Care Team:  Chirag Robles DO as PCP - General (Family Medicine)  Stanley Lopez MD, PhD as Consulting Physician (Cardiology)  Мария Nunez MD as Consulting Physician (Hematology and Oncology)    CHIEF COMPLAINT: Syncope    HISTORY OF PRESENT ILLNESS:    I the pleasure to see this 62-year-old patient at patient's request today who is scheduled to see me in less than a month.  She had a syncopal episode while standing at work without a break, she says she has not eaten all day and had nothing to drink.  She says she woke up on the floor without propagating signs or symptoms, no seizure-like activity, no chest pain shortness of breath dizziness tunnel vision diaphoresis or other complaints.  She has a pacemaker in place for sick sinus syndrome with AV paced rhythm presently.  She wants to go home today.  Interrogation of device has not been performed yet, no 2D echo since 2019, stress test 2019 with no reversible ischemia, history of preserved EF 50 to 55% with no valvular abnormality.  Exam today is essentially nominal with normal volume status no critical murmurs that she has been ambulatory without issues with normal functioning device.  Is a [employer] device in place    As discussed with the patient  As her device does not showed significant arrhythmia and is normal functioning and she is able to ambulate effectively with normal blood pressure she can be worked up as an outpatient  2D echo is pending prior to discharge    Review of systems otherwise negative x14 point review of systems except as mentioned above  History of presenting copied forward from previous encounters in EMR is unchanged    EKG AV paced rhythm along with telemetry    Past Medical History:   Diagnosis Date   • Allergic    • Bone cancer (HCC)     METASTATIC BONE   • Bradycardia     SECONDARY TO ABLATION   • Breast cancer (HCC) 2017    mets to lymph nodes; did not  do radiation   • Cancer of unknown origin (HCC)    • Compression fx, thoracic spine, open, initial encounter (HCC)    • Coronary artery disease    • COVID-19 2021   • Diabetes mellitus (HCC)    • Heart disease, unspecified    • Hepatitis C     RESOLVED WITH MEDICATION   • Hyperlipidemia    • Hypertension    • Obesity (BMI 30-39.9) 2021   • Rib fracture     Per patient   • Sleep apnea     no machine   • Tachycardia     ATRIAL   • Type 2 diabetes mellitus (HCC) 2017     Past Surgical History:   Procedure Laterality Date   • BACK SURGERY      neck X 2   • BREAST RECONSTRUCTION Bilateral    • CARDIAC ABLATION       atrial tachycardia x 5 ablations    • CARDIAC CATHETERIZATION     • CARDIAC SURGERY      stent placed in aorta   • CARDIAC SURGERY      6 surgeries as baby    • CERVICAL FUSION ANTERIOR WITH ARTIFICIAL DISCECTOMY IMPLANTATION N/A 2020    Procedure: C4 VERTEBRECTOMY AND ANTERIOR CERIVCAL DISCECTOMY WITH FUSION OF CERVICAL THREE THROUGH FIVE WITH REMOVAL OF HARDWARE C5-C6;  Surgeon: Mark Kaba MD;  Location: [facility] MAIN OR;  Service: Neurosurgery;  Laterality: N/A;   •  SECTION      x2   • COLONOSCOPY N/A 10/23/2020    Procedure: COLONOSCOPY WITH POLYPECTOMY X6;  Surgeon: Stanley Bourne MD;  Location: [facility] ENDOSCOPY;  Service: Gastroenterology;  Laterality: N/A;  POLYPS, INTERNAL HEMORRHOIDS   • ENDOMETRIAL ABLATION      REMOVAL SCAR TISSUE UTERINE   • ENDOSCOPY N/A 10/23/2020    Procedure: ESOPHAGOGASTRODUODENOSCOPY WITH BIOPSY X 1 AREA;  Surgeon: Stanley Bourne MD;  Location: [facility] ENDOSCOPY;  Service: Gastroenterology;  Laterality: N/A;  GASTRITIS, ESOPHAGITIS, HIATAL HERNIA   • KNEE SURGERY     • MASTECTOMY Bilateral    • NECK SURGERY     • PACEMAKER IMPLANTATION     • PAIN PUMP INSERTION/REVISION N/A 2022    Procedure: PAIN PUMP INSERTION AND INTRATHECAL CATHETER PLACEMENT;  Surgeon: Chuck Hunter MD;  Location: [facility] MAIN OR;   Service: Pain Management;  Laterality: N/A;     Family History   Problem Relation Age of Onset   • Heart disease Mother    • Stroke Mother    • Lung cancer Mother    • Aneurysm Father    • Diabetes Sister    • No Known Problems Brother    • No Known Problems Brother    • Diabetes type I Half-Sister    • Thyroid cancer Half-Sister    • Cancer Maternal Aunt    • Heart attack Sister    • Thyroid disease Sister    • No Known Problems Sister      Social History     Tobacco Use   • Smoking status: Never     Passive exposure: Never   • Smokeless tobacco: Never   Vaping Use   • Vaping Use: Never used   Substance Use Topics   • Alcohol use: Yes     Alcohol/week: 1.0 standard drink     Types: 1 Glasses of wine per week     Comment: mixed drink once a week   • Drug use: Not Currently     Medications Prior to Admission   Medication Sig Dispense Refill Last Dose   • acetaminophen (TYLENOL) 650 MG 8 hr tablet Take 1 tablet by mouth As Needed for Mild Pain. TAKES BETWEEN PAIN MEDS   3/7/2023   • aspirin 81 MG chewable tablet Chew 1 tablet Daily. 30 tablet 1 Past Month   • Calcium Carbonate-Vit D-Min (Calcium 600+D Plus Minerals) 600-400 MG-UNIT chewable tablet Chew 600 mg 2 (Two) Times a Day. (Patient taking differently: Chew 600 mg 2 (Two) Times a Day. GUMMIES) 60 each 6 3/6/2023   • ibuprofen (ADVIL,MOTRIN) 400 MG tablet Take 1 tablet by mouth As Needed for Mild Pain. IN BETWEEN PAIN  MEDS   3/7/2023   • insulin NPH-insulin regular (humuLIN 70/30,novoLIN 70/30) (70-30) 100 UNIT/ML injection Inject 40 Units under the skin into the appropriate area as directed Every Morning.   3/7/2023   • insulin NPH-insulin regular (humuLIN 70/30,novoLIN 70/30) (70-30) 100 UNIT/ML injection Inject 30 Units under the skin into the appropriate area as directed Every Evening.   3/6/2023   • Blood Glucose Monitoring Suppl (Accu-Chek Araceli) device Use as instructed   To test blood sugar bid 1 each 0    • glucose blood (Accu-Chek Araceli Plus) test  "strip Use as instructed   To test bid 200 each 3    • Insulin Pen Needle (B-D UF III MINI PEN NEEDLES) 31G X 5 MM misc Use to inject insulin twice daily   DX: E11.9 100 each 0    • Insulin Syringe-Needle U-100 28G X 1/2\" 1 ML misc 1 each 2 (Two) Times a Day. 100 each 6    • Lancets (accu-chek soft touch) lancets Test bid 200 each 3    • lisinopril (PRINIVIL,ZESTRIL) 20 MG tablet Take 1 tablet by mouth Daily.   More than a month   • rosuvastatin (Crestor) 20 MG tablet Take 1 tablet by mouth Every Night. 90 tablet 0 More than a month     Allergies:  Promethazine and Tape    REVIEW OF SYSTEMS:  Please see the above history of present illness for pertinent positives and negatives.  The remainder of the patient's systems have been reviewed and are negative.     Vital Signs  Temp:  [97.4 °F (36.3 °C)-98.2 °F (36.8 °C)] 97.8 °F (36.6 °C)  Heart Rate:  [69-77] 70  Resp:  [15-20] 15  BP: (121-200)/() 148/73    Flowsheet Rows    Flowsheet Row First Filed Value   Admission Height 154.9 cm (61\") Documented at 03/07/2023 1527   Admission Weight 63.5 kg (140 lb) Documented at 03/07/2023 1527           Physical Exam:  Physical Exam   Constitutional: Patient appears well-developed and well-nourished and in no acute distress   HEENT:   Head: Normocephalic and atraumatic.   Eyes:  Pupils are equal, round, and reactive to light. EOM are intact. Sclerae are anicteric and noninjected.  Mouth and Throat: Patient has moist mucous membranes. Oropharynx is clear of any erythema or exudate.     Neck: Neck supple. No JVD present. No thyromegaly present. No lymphadenopathy present.  Cardiovascular: Regular rate, regular rhythm, S1 normal and S2 normal.  Exam reveals no gallop and no friction rub.  No murmur heard.  Pulmonary/Chest: Lungs are clear to auscultation bilaterally. No respiratory distress. No wheezes. No rhonchi. No rales.   Abdominal: Soft. Bowel sounds are normal. No distension and no mass. There is no hepatosplenomegaly. " There is no tenderness.   Musculoskeletal: Normal muscle tone  Extremities: No edema. Pulses are palpable in all 4 extremities.  Neurological: Patient is alert and oriented to person, place, and time. Cranial nerves II-XII are grossly intact with no focal deficits.  Skin: Skin is warm. No rash noted. Nails show no clubbing.  No cyanosis or erythema.     Results Review:    I reviewed the patient's new clinical results.  Lab Results (most recent)     Procedure Component Value Units Date/Time    BNP [[id number]]  (Normal) Collected: 03/08/23 0535    Specimen: Blood Updated: 03/08/23 1139     proBNP 347.3 pg/mL     Narrative:      Among patients with dyspnea, NT-proBNP is highly sensitive for the detection of acute congestive heart failure. In addition NT-proBNP of <300 pg/ml effectively rules out acute congestive heart failure with 99% negative predictive value.    Results may be falsely decreased if patient taking Biotin.      POC Glucose Once [[id number]]  (Abnormal) Collected: 03/08/23 1124    Specimen: Blood Updated: 03/08/23 1126     Glucose 108 mg/dL      Comment: Serial Number: [id number]:  [id number]       POC Glucose Once [[id number]]  (Abnormal) Collected: 03/08/23 0834    Specimen: Blood Updated: 03/08/23 0835     Glucose 124 mg/dL      Comment: Serial Number: [id number]:  [id number]       Basic Metabolic Panel [[id number]]  (Abnormal) Collected: 03/08/23 0535    Specimen: Blood Updated: 03/08/23 0625     Glucose 133 mg/dL      BUN 16 mg/dL      Creatinine 0.75 mg/dL      Sodium 140 mmol/L      Potassium 3.8 mmol/L      Chloride 105 mmol/L      CO2 24.0 mmol/L      Calcium 9.2 mg/dL      BUN/Creatinine Ratio 21.3     Anion Gap 11.0 mmol/L      eGFR 91.3 mL/min/1.73     Narrative:      GFR Normal >60  Chronic Kidney Disease <60  Kidney Failure <15      High Sensitivity Troponin T [[id number]]  (Abnormal) Collected: 03/08/23 0535    Specimen: Blood Updated: 03/08/23 0625     HS Troponin T 13 ng/L      Narrative:      High Sensitive Troponin T Reference Range:  <10.0 ng/L- Negative Female for AMI  <15.0 ng/L- Negative Male for AMI  >=10 - Abnormal Female indicating possible myocardial injury.  >=15 - Abnormal Male indicating possible myocardial injury.   Clinicians would have to utilize clinical acumen, EKG, Troponin, and serial changes to determine if it is an Acute Myocardial Infarction or myocardial injury due to an underlying chronic condition.         CBC (No Diff) [[id number]]  (Abnormal) Collected: 03/08/23 0535    Specimen: Blood Updated: 03/08/23 0544     WBC 4.70 10*3/mm3      RBC 4.73 10*6/mm3      Hemoglobin 13.3 g/dL      Hematocrit 40.3 %      MCV 85.4 fL      MCH 28.1 pg      MCHC 32.9 g/dL      RDW 13.8 %      RDW-SD 43.8 fl      MPV 7.6 fL      Platelets 133 10*3/mm3     High Sensitivity Troponin T 2Hr [[id number]]  (Abnormal) Collected: 03/07/23 2119    Specimen: Blood from Arm, Right Updated: 03/07/23 2148     HS Troponin T 17 ng/L      Troponin T Delta 3 ng/L     Narrative:      High Sensitive Troponin T Reference Range:  <10.0 ng/L- Negative Female for AMI  <15.0 ng/L- Negative Male for AMI  >=10 - Abnormal Female indicating possible myocardial injury.  >=15 - Abnormal Male indicating possible myocardial injury.   Clinicians would have to utilize clinical acumen, EKG, Troponin, and serial changes to determine if it is an Acute Myocardial Infarction or myocardial injury due to an underlying chronic condition.         Comprehensive Metabolic Panel [[id number]]  (Abnormal) Collected: 03/07/23 1620    Specimen: Blood Updated: 03/07/23 1653     Glucose 104 mg/dL      BUN 12 mg/dL      Creatinine 0.76 mg/dL      Sodium 139 mmol/L      Potassium 4.9 mmol/L      Comment: Slight hemolysis detected by analyzer. Results may be affected.        Chloride 101 mmol/L      CO2 25.0 mmol/L      Calcium 10.6 mg/dL      Total Protein 7.9 g/dL      Albumin 4.5 g/dL      ALT (SGPT) 18 U/L      AST (SGOT) 28 U/L       Alkaline Phosphatase 94 U/L      Total Bilirubin 0.6 mg/dL      Globulin 3.4 gm/dL      A/G Ratio 1.3 g/dL      BUN/Creatinine Ratio 15.8     Anion Gap 13.0 mmol/L      eGFR 89.8 mL/min/1.73     Narrative:      GFR Normal >60  Chronic Kidney Disease <60  Kidney Failure <15      High Sensitivity Troponin T [[id number]]  (Abnormal) Collected: 03/07/23 1620    Specimen: Blood Updated: 03/07/23 1653     HS Troponin T 14 ng/L     Narrative:      High Sensitive Troponin T Reference Range:  <10.0 ng/L- Negative Female for AMI  <15.0 ng/L- Negative Male for AMI  >=10 - Abnormal Female indicating possible myocardial injury.  >=15 - Abnormal Male indicating possible myocardial injury.   Clinicians would have to utilize clinical acumen, EKG, Troponin, and serial changes to determine if it is an Acute Myocardial Infarction or myocardial injury due to an underlying chronic condition.         CK [[id number]]  (Normal) Collected: 03/07/23 1620    Specimen: Blood Updated: 03/07/23 1653     Creatine Kinase 98 U/L     Magnesium [[id number]]  (Normal) Collected: 03/07/23 1620    Specimen: Blood Updated: 03/07/23 1653     Magnesium 2.2 mg/dL     CBC & Differential [[id number]]  (Normal) Collected: 03/07/23 1620    Specimen: Blood Updated: 03/07/23 1630    Narrative:      The following orders were created for panel order CBC & Differential.  Procedure                               Abnormality         Status                     ---------                               -----------         ------                     CBC Auto Differential[[id number]]        Normal              Final result                 Please view results for these tests on the individual orders.    CBC Auto Differential [[id number]]  (Normal) Collected: 03/07/23 1620    Specimen: Blood Updated: 03/07/23 1630     WBC 6.40 10*3/mm3      RBC 4.94 10*6/mm3      Hemoglobin 13.7 g/dL      Hematocrit 43.0 %      MCV 87.1 fL      MCH 27.7 pg      MCHC 31.8 g/dL      RDW 14.0 %       RDW-SD 42.9 fl      MPV 7.7 fL      Platelets 142 10*3/mm3      Neutrophil % 64.9 %      Lymphocyte % 25.5 %      Monocyte % 7.9 %      Eosinophil % 1.3 %      Basophil % 0.4 %      Neutrophils, Absolute 4.20 10*3/mm3      Lymphocytes, Absolute 1.60 10*3/mm3      Monocytes, Absolute 0.50 10*3/mm3      Eosinophils, Absolute 0.10 10*3/mm3      Basophils, Absolute 0.00 10*3/mm3      nRBC 0.0 /100 WBC           Imaging Results (Most Recent)     Procedure Component Value Units Date/Time    XR Spine Lumbar Complete 4+VW [[id number]] Collected: 03/07/23 1831     Updated: 03/07/23 1837    Narrative:      XR SPINE LUMBAR COMPLETE 4+VW    Date of Exam: 3/7/2023 5:06 PM EST    Indication: Trauma.    Comparison: CT lumbar spine 7/20/2020    Findings:    *No acute fractures or dislocations.  *Normal alignment.  *No osseous destructive lesions.  *Multilevel spondylosis.  *Generalized osteopenia.  *Unchanged anterior compression wedge deformity involving T12 vertebral body.  *      Impression:      1.No acute fractures or dislocations.   2.Multilevel spondylosis.  3.Generalized osteopenia.  4.Unchanged anterior compression wedge deformity involving T12 vertebral body.         Electronically Signed: Placido Moran    3/7/2023 6:35 PM EST    Workstation ID: [id number]    CT Head Without Contrast [[id number]] Collected: 03/07/23 1812     Updated: 03/07/23 1823    Narrative:      CT HEAD WO CONTRAST, CT CERVICAL SPINE WO CONTRAST    Date of Exam: 3/7/2023 5:31 PM EST    Indication: Syncope headache head injury.    Comparison: 7/26/2021    Technique: Axial CT images were obtained of the head without contrast administration.  Sagittal and coronal reconstructions were performed.  Automated exposure control and iterative reconstruction methods were used.     Findings:    CT BRAIN:  *No acute intracranial hemorrhage.  *No masses, mass effect, midline shift or hydrocephalus.  *White matter is intact.  *Calvarium is intact.  *Visualized orbits and  globes are unremarkable without radiopaque foreign bodies.  *Visualized paranasal sinuses are clear.  *Visualized mastoid air cells are clear.    CT CERVICAL SPINE:  *No acute fractures or dislocations.  *Severe levoscoliosis at the junction of cervical and thoracic spine..  *No prevertebral soft tissue swelling.  *No osseous destructive lesions.  *Multilevel spondylosis.  *Severe generalized osteopenia.  *Anterior spinal fusion hardware from C3 to C6.        Impression:      1.No acute intracranial hemorrhage. Calvarium is intact.   2.No acute fractures or dislocations of cervical spine. Multilevel spondylosis. Osteopenia. Anterior spinal fusion hardware.        Electronically Signed: Placido Moran    3/7/2023 6:21 PM EST    Workstation ID: [id number]    CT Cervical Spine Without Contrast [[id number]] Collected: 03/07/23 1812     Updated: 03/07/23 1823    Narrative:      CT HEAD WO CONTRAST, CT CERVICAL SPINE WO CONTRAST    Date of Exam: 3/7/2023 5:31 PM EST    Indication: Syncope headache head injury.    Comparison: 7/26/2021    Technique: Axial CT images were obtained of the head without contrast administration.  Sagittal and coronal reconstructions were performed.  Automated exposure control and iterative reconstruction methods were used.     Findings:    CT BRAIN:  *No acute intracranial hemorrhage.  *No masses, mass effect, midline shift or hydrocephalus.  *White matter is intact.  *Calvarium is intact.  *Visualized orbits and globes are unremarkable without radiopaque foreign bodies.  *Visualized paranasal sinuses are clear.  *Visualized mastoid air cells are clear.    CT CERVICAL SPINE:  *No acute fractures or dislocations.  *Severe levoscoliosis at the junction of cervical and thoracic spine..  *No prevertebral soft tissue swelling.  *No osseous destructive lesions.  *Multilevel spondylosis.  *Severe generalized osteopenia.  *Anterior spinal fusion hardware from C3 to C6.        Impression:      1.No acute  intracranial hemorrhage. Calvarium is intact.   2.No acute fractures or dislocations of cervical spine. Multilevel spondylosis. Osteopenia. Anterior spinal fusion hardware.        Electronically Signed: Placido Dean    3/7/2023 6:21 PM EST    Workstation ID: [id number]    XR Chest 2 View [[id number]] Collected: 03/07/23 1732     Updated: 03/07/23 1754    Narrative:      XR CHEST 2 VW    Date of Exam: 3/7/2023 5:04 PM EST    Indication: Syncope.    Comparison: AP chest x-ray 2/9/2023, CT chest 10/31/2022    Findings:  Cardiac silhouette remains enlarged. Pacemaker leads are stable. Sternal wires are again noted, with the inferior sternal wire remaining fractured. There are diffusely increased airspace opacities in both lungs. No pneumothorax or large pleural effusion   is seen.      Impression:      Impression:  1.Diffusely increased airspace opacities in both lungs, likely due to pulmonary edema.  2.Stable enlarged cardiac silhouette.    Electronically Signed: Dione Castillo    3/7/2023 5:52 PM EST    Workstation ID: [id number]    XR Shoulder 2+ View Left [[id number]] Collected: 03/07/23 1721     Updated: 03/07/23 1726    Narrative:      XR SHOULDER 2+ VW LEFT, XR SCAPULA LEFT    Date of Exam: 3/7/2023 5:05 PM EST    Indication: Syncope left shoulder pain left scapular pain.    Comparison: None available.    Findings:  Study limited by overlying support and monitoring apparatus. Study also limited by left subclavian approach pacemaker.    Left shoulder demonstrates no acute fracture or dislocation. The AC joint is grossly unremarkable in appearance. Evaluation of the glenohumeral joint is limited. Limited imaging of the chest demonstrate a remote rib fractures. No obvious pneumothorax   noted.    Dedicated imaging of the scapula demonstrates no acute osseous abnormality.      Impression:      Impression:  Limited study secondary to body habitus and overlying support apparatus and pacemaker.    No definite acute osseous  abnormality noted limitations of exam    Electronically Signed: Minesh Maya    3/7/2023 5:24 PM EST    Workstation ID: [id number]    XR Scapula Left [[id number]] Collected: 03/07/23 1721     Updated: 03/07/23 1726    Narrative:      XR SHOULDER 2+ VW LEFT, XR SCAPULA LEFT    Date of Exam: 3/7/2023 5:05 PM EST    Indication: Syncope left shoulder pain left scapular pain.    Comparison: None available.    Findings:  Study limited by overlying support and monitoring apparatus. Study also limited by left subclavian approach pacemaker.    Left shoulder demonstrates no acute fracture or dislocation. The AC joint is grossly unremarkable in appearance. Evaluation of the glenohumeral joint is limited. Limited imaging of the chest demonstrate a remote rib fractures. No obvious pneumothorax   noted.    Dedicated imaging of the scapula demonstrates no acute osseous abnormality.      Impression:      Impression:  Limited study secondary to body habitus and overlying support apparatus and pacemaker.    No definite acute osseous abnormality noted limitations of exam    Electronically Signed: Minesh Maya    3/7/2023 5:24 PM EST    Workstation ID: [id number]    XR Spine Thoracic 3 View [[id number]] Collected: 03/07/23 1717     Updated: 03/07/23 1725    Narrative:      XR SPINE THORACIC 3 VW    Date of Exam: 3/7/2023 5:06 PM EST    Indication: Trauma.    Comparison: 2/4/2021    Findings:  As below.      Impression:      Impression:  *Unchanged mild dextroscoliosis of the thoracic spine.  *Multilevel spondylosis.  *Evaluation for compression fractures is limited due to technique and overlying soft tissues. Recommend CT for further evaluation and to exclude thoracic spine fractures.  *Generalized osteopenia.    Electronically Signed: Placido Moran    3/7/2023 5:22 PM EST    Workstation ID: [id number]        reviewed    ECG/EMG Results (most recent)     Procedure Component Value Units Date/Time    ECG 12 Lead Syncope [[id number]]  Collected: 03/07/23 1536     Updated: 03/07/23 1537    Narrative:      HEART RATE= 73  bpm  RR Interval= 821  ms  OK Interval= 126  ms  P Horizontal Axis=   deg  P Front Axis= -90  deg  QRSD Interval= 196  ms  QT Interval=   ms  QRS Axis= 259  deg  T Wave Axis= -26  deg  - ABNORMAL ECG -  Atrial-ventricular dual-paced rhythm  When compared with ECG of 09-Feb-2023 12:01:17,  No significant change  Electronically Signed By:   Date and Time of Study: 2023-03-07 15:36:47    SCANNED - TELEMETRY   [[id number]] Resulted: 03/07/23     Updated: 03/08/23 1148    SCANNED - TELEMETRY   [[id number]] Resulted: 03/07/23     Updated: 03/08/23 1156    SCANNED - TELEMETRY   [[id number]] Resulted: 03/07/23     Updated: 03/08/23 1156        reviewed    Assessment & Plan   History of tetralogy of Fallot  Essential hypertension  Pacemaker in place  RV enlargement  History of pulmonary hypertension  Hyperlipidemia  Nonobstructive CAD historically    Syncope and collapse  Interrogate pacemaker  2D echo  Gentle bolus of IV fluids  Patient wants to go home  If the above is unremarkable I see no issues with her being discharged today provided her orthostatics are negative and her device is normal functioning and she can follow-up as an outpatient    No indication for ischemic evaluation presently without chest pain  Troponins unremarkable  She has been ambulatory without issue today  Encourage good p.o. intake, heart healthy low-sodium diet, primary mention goals for CAD    Stress testing can occur as outpatient    Patient wished to be discharged if can be done so safely    Stanley Lopez MD, PhD    I discussed the patient's findings and my recommendations with patient and staff    Stanley Lopez MD  03/08/23  13:25 EST

## 2023-03-09 ENCOUNTER — TRANSITIONAL CARE MANAGEMENT TELEPHONE ENCOUNTER (OUTPATIENT)
Dept: CALL CENTER | Facility: HOSPITAL | Age: 63
End: 2023-03-09
Payer: MEDICARE

## 2023-03-09 NOTE — SIGNIFICANT NOTE
Case Management Discharge Note      Final Note: (P) d/c home         Selected Continued Care - Discharged on 3/8/2023 Admission date: 3/7/2023 - Discharge disposition: Home or Self Care                          Final Discharge Disposition Code: (P) 01 - home or self-care

## 2023-03-09 NOTE — OUTREACH NOTE
Prep Survey    Flowsheet Row Responses   [facility] patient discharged from? [facility]   Is LACE score < 7 ? No   Eligibility [facility]   Date of Admission 03/07/23   Date of Discharge 03/08/23   Discharge Disposition Home or Self Care   Discharge diagnosis Syncope, unspecified syncope type   Does the patient have one of the following disease processes/diagnoses(primary or secondary)? Other   Does the patient have Home health ordered? No   Is there a DME ordered? No   Prep survey completed? Yes          Radha MARCOS - Registered Nurse

## 2023-03-09 NOTE — OUTREACH NOTE
Call Center TCM Note    Flowsheet Row Responses   [facility] patient discharged from? [facility]   Does the patient have one of the following disease processes/diagnoses(primary or secondary)? Other   TCM attempt successful? Yes   Call start time 1116   Call end time 1118   Discharge diagnosis Syncope, unspecified syncope type   Meds reviewed with patient/caregiver? Yes   Is the patient having any side effects they believe may be caused by any medication additions or changes? No   Does the patient have all medications ordered at discharge? N/A   Is the patient taking all medications as directed (includes completed medication regime)? Yes   Comments Hosp dc [facility] on 3/20/23 with PCP    Does the patient have an appointment with their PCP within 7 days of discharge? Yes   Has home health visited the patient within 72 hours of discharge? N/A   Psychosocial issues? No   Did the patient receive a copy of their discharge instructions? Yes   Nursing interventions Reviewed instructions with patient   What is the patient's perception of their health status since discharge? Improving   Is the patient/caregiver able to teach back signs and symptoms related to disease process for when to call PCP? Yes   Is the patient/caregiver able to teach back signs and symptoms related to disease process for when to call 911? Yes   Is the patient/caregiver able to teach back the hierarchy of who to call/visit for symptoms/problems? PCP, Specialist, Home health nurse, Urgent Care, ED, 911 Yes   If the patient is a current smoker, are they able to teach back resources for cessation? Not a smoker   TCM call completed? Yes   Call end time 1118          Kaia Winkler RN    3/9/2023, 11:18 EST

## 2023-03-10 LAB
BH CV ECHO MEAS - ACS: 1.3 CM
BH CV ECHO MEAS - AO MAX PG: 8.6 MMHG
BH CV ECHO MEAS - AO MEAN PG: 5 MMHG
BH CV ECHO MEAS - AO ROOT DIAM: 3.4 CM
BH CV ECHO MEAS - AO V2 MAX: 147 CM/SEC
BH CV ECHO MEAS - AO V2 VTI: 31.8 CM
BH CV ECHO MEAS - AVA(I,D): 2.08 CM2
BH CV ECHO MEAS - EDV(CUBED): 97.3 ML
BH CV ECHO MEAS - EDV(MOD-SP4): 74.5 ML
BH CV ECHO MEAS - EF(MOD-BP): 56.9 %
BH CV ECHO MEAS - EF(MOD-SP4): 56.9 %
BH CV ECHO MEAS - ESV(CUBED): 32.8 ML
BH CV ECHO MEAS - ESV(MOD-SP4): 32.1 ML
BH CV ECHO MEAS - FS: 30.4 %
BH CV ECHO MEAS - IVS/LVPW: 1 CM
BH CV ECHO MEAS - IVSD: 1.1 CM
BH CV ECHO MEAS - LA DIMENSION: 4 CM
BH CV ECHO MEAS - LAT PEAK E' VEL: 5.5 CM/SEC
BH CV ECHO MEAS - LV DIASTOLIC VOL/BSA (35-75): 44.7 CM2
BH CV ECHO MEAS - LV MASS(C)D: 181.2 GRAMS
BH CV ECHO MEAS - LV MAX PG: 4.2 MMHG
BH CV ECHO MEAS - LV MEAN PG: 2 MMHG
BH CV ECHO MEAS - LV SYSTOLIC VOL/BSA (12-30): 19.3 CM2
BH CV ECHO MEAS - LV V1 MAX: 102 CM/SEC
BH CV ECHO MEAS - LV V1 VTI: 21.1 CM
BH CV ECHO MEAS - LVIDD: 4.6 CM
BH CV ECHO MEAS - LVIDS: 3.2 CM
BH CV ECHO MEAS - LVOT AREA: 3.1 CM2
BH CV ECHO MEAS - LVOT DIAM: 2 CM
BH CV ECHO MEAS - LVPWD: 1.1 CM
BH CV ECHO MEAS - MED PEAK E' VEL: 5.3 CM/SEC
BH CV ECHO MEAS - MV A MAX VEL: 96.1 CM/SEC
BH CV ECHO MEAS - MV DEC SLOPE: 460 CM/SEC2
BH CV ECHO MEAS - MV DEC TIME: 0.18 MSEC
BH CV ECHO MEAS - MV E MAX VEL: 74.5 CM/SEC
BH CV ECHO MEAS - MV E/A: 0.78
BH CV ECHO MEAS - MV MAX PG: 4 MMHG
BH CV ECHO MEAS - MV MEAN PG: 2 MMHG
BH CV ECHO MEAS - MV P1/2T: 57.3 MSEC
BH CV ECHO MEAS - MV V2 VTI: 22.8 CM
BH CV ECHO MEAS - MVA(P1/2T): 3.8 CM2
BH CV ECHO MEAS - MVA(VTI): 2.9 CM2
BH CV ECHO MEAS - PA ACC TIME: 0.09 SEC
BH CV ECHO MEAS - PA PR(ACCEL): 37.6 MMHG
BH CV ECHO MEAS - PA V2 MAX: 146 CM/SEC
BH CV ECHO MEAS - PULM A REVS DUR: 0.11 SEC
BH CV ECHO MEAS - PULM A REVS VEL: 23 CM/SEC
BH CV ECHO MEAS - PULM DIAS VEL: 31.5 CM/SEC
BH CV ECHO MEAS - PULM S/D: 1.33
BH CV ECHO MEAS - PULM SYS VEL: 42 CM/SEC
BH CV ECHO MEAS - QP/QS: 2.49
BH CV ECHO MEAS - RV MAX PG: 6.3 MMHG
BH CV ECHO MEAS - RV V1 MAX: 125 CM/SEC
BH CV ECHO MEAS - RV V1 VTI: 25 CM
BH CV ECHO MEAS - RVDD: 4.2 CM
BH CV ECHO MEAS - RVOT DIAM: 2.9 CM
BH CV ECHO MEAS - SI(MOD-SP4): 25.4 ML/M2
BH CV ECHO MEAS - SV(LVOT): 66.3 ML
BH CV ECHO MEAS - SV(MOD-SP4): 42.4 ML
BH CV ECHO MEAS - SV(RVOT): 165.1 ML
BH CV ECHO MEAS - TAPSE (>1.6): 1.8 CM
BH CV ECHO MEAS - TR MAX PG: 30 MMHG
BH CV ECHO MEAS - TR MAX VEL: 274 CM/SEC
BH CV ECHO MEASUREMENTS AVERAGE E/E' RATIO: 13.8
BH CV XLRA - RV BASE: 4.9 CM
BH CV XLRA - RV LENGTH: 7.9 CM
BH CV XLRA - RV MID: 3.8 CM
BH CV XLRA - TDI S': 6.8 CM/SEC
LEFT ATRIUM VOLUME INDEX: 24.7 ML/M2
MAXIMAL PREDICTED HEART RATE: 160 BPM
STRESS TARGET HR: 136 BPM

## 2023-03-14 ENCOUNTER — TELEPHONE (OUTPATIENT)
Dept: PAIN MEDICINE | Facility: CLINIC | Age: 63
End: 2023-03-14

## 2023-03-14 ENCOUNTER — TELEPHONE (OUTPATIENT)
Dept: ONCOLOGY | Facility: CLINIC | Age: 63
End: 2023-03-14
Payer: MEDICARE

## 2023-03-14 NOTE — TELEPHONE ENCOUNTER
Caller: Gladys Garrison    Relationship to patient: Self    Best call back number:     Patient is needing: UNABLE TO WARM TRANSFER.  PATIENT NEEDS APPT TO TURN PAIN PUMP UP

## 2023-03-14 NOTE — TELEPHONE ENCOUNTER
Caller: Gladys Garrison    Relationship to patient: Self    Best call back number: [phone]    Patient is needing: TO R/S HER APPTS SHE MISSED. SHE STATES SHE NEVER RECEIVED A CALL LAST TIME.

## 2023-03-16 ENCOUNTER — TELEPHONE (OUTPATIENT)
Dept: PAIN MEDICINE | Facility: CLINIC | Age: 63
End: 2023-03-16
Payer: MEDICARE

## 2023-03-16 NOTE — TELEPHONE ENCOUNTER
PT CALLED IN TEARS IN LOTS OF PAIN. SHE STATED SHE HAS CANCER AND THE PAIN HAS GOTTEN WORSE AND WORSE BUT SHE CANT TAKE IT ANYMORE. SHE CANNOT GO TO WORK AND DO HER JOB AND SAYS SHE HAS NEVER ASKED FOR AN INCREASE AND REALLY NEEDS IT ADJUSTED. PLEASE ADVISE

## 2023-03-17 ENCOUNTER — TELEPHONE (OUTPATIENT)
Dept: ONCOLOGY | Facility: CLINIC | Age: 63
End: 2023-03-17
Payer: MEDICARE

## 2023-03-17 NOTE — TELEPHONE ENCOUNTER
Caller: PATIENT    Relationship to patient: SELF     Best call back number: [phone]    Patient is needing: PATIENT CALLED BACK VERY UPSET THAT SHE HAS NOT RECEIVED A CALL BACK IN REGARDS TO HER REQUEST FOR A PAIN PUMP INCREASE. PATIENT STATED SHE HAS CANCER AND IS IN A LOT OF PAIN. PATIENT WOULD LIKE A CALL BACK ASAP TO DISCUSS THIS. THANK YOU!

## 2023-03-17 NOTE — TELEPHONE ENCOUNTER
Caller: Lucien Garrison    Relationship: Self    Best call back number: [phone]    What is the best time to reach you: ASAP    Who are you requesting to speak with (clinical staff, provider,  specific staff member): CLINICAL    What was the call regarding: LUCIEN IS CALLING SOBBING STATES SHE IS IN SEVERE PAIN.     SHE SEES THE PAIN CLINICAL AND CALLED THEM TO SEE IF THEY WOULD TURN UP HER PAIN PUMP.  SHE CALLED THEM SEVERAL TIMES    SHEYLONGYNES STATES THAT THE DR JERONIMO OFFICE TOLD HER  THAT HE  IS ON VACATION.    SHE ASKED HIS OFFICE SINCE HE WAS ON VACATION IF SOMEONE ELSE THAT WOULD BE COVERING HIM WOULD BE ABLE TO TURN UP HER PAIN PUMP, BUT HAS NOT RECEIVED A CALL BACK.    SHE IS ASKING FOR DR LYLE TO CALL THEIR OFFICE TO HAVE THEM CALL HER TO HELP HER     Do you require a callback: YES

## 2023-03-17 NOTE — TELEPHONE ENCOUNTER
Unfortunately, she has to wait till Monday for Dr. Hunter to see if he would like to change her pain pump dosing. I don't feel comfortable in changing dosing in pain pump. I can't prescribe oral opioids as she is already getting intrathecal medications.  If her pain is severe where she can't wait till Monday, I would recommend going to ER.     Mariusz Martel DO  Pain Management   [facility]

## 2023-03-17 NOTE — TELEPHONE ENCOUNTER
"Called pt back to discuss pain pump. I asked if her pump was empty and she stated that it is not empty, it is just not controlling her pain. She stated that she reached out to the Pain Management office several times, but was told that Dr. Hunter is on vacation and she may have to wait 48 hours to hear back. She stated that she has taken a lot of Tylenol, stating that she took \"probably enough to overdose' and that she had been numbing the pain with tequila, but she ran out. I explained that we cannot prescribe additional pain medication since she sees Pain Management and advised that she go to the ER. She asked if I could reach out to Pain Management and let them know that she has cancer. I told her I would and would call her back.    Called Pain Management to see if there was anything I could do. I was told that Dr. Joseph's is still on vacation and was put on hold while they asked what they should do. I waited on hold and was eventually told that a call was taken from the pt approx 30 minutes prior and was routed to the doctor.     I called the pt and relayed this to her. I told her that we still advise her to go to the ER and she stated that she did not want to do that. Pt stated that she would call her brother and ask him to bring her more tequila. I advised her to reach out to Pain Management again if she does not hear from them. Pt verbalized understanding.   "

## 2023-03-17 NOTE — TELEPHONE ENCOUNTER
[place] office of the pt called again to see what our office could do for pt. I advised Kim (from office) that we have already sent a message to  who is on vacation, and have also routed messages to  to advise for pt. Advised pt should go to ER if pain gets worse.

## 2023-03-20 ENCOUNTER — OFFICE VISIT (OUTPATIENT)
Dept: FAMILY MEDICINE CLINIC | Facility: CLINIC | Age: 63
End: 2023-03-20
Payer: MEDICARE

## 2023-03-20 ENCOUNTER — HOSPITAL ENCOUNTER (OUTPATIENT)
Dept: PAIN MEDICINE | Facility: HOSPITAL | Age: 63
Discharge: HOME | End: 2023-03-20
Admitting: STUDENT IN AN ORGANIZED HEALTH CARE EDUCATION/TRAINING PROGRAM
Payer: MEDICARE

## 2023-03-20 VITALS
HEART RATE: 89 BPM | WEIGHT: 148 LBS | BODY MASS INDEX: 27.94 KG/M2 | RESPIRATION RATE: 16 BRPM | SYSTOLIC BLOOD PRESSURE: 163 MMHG | HEIGHT: 61 IN | OXYGEN SATURATION: 97 % | TEMPERATURE: 97.9 F | DIASTOLIC BLOOD PRESSURE: 109 MMHG

## 2023-03-20 VITALS
DIASTOLIC BLOOD PRESSURE: 90 MMHG | WEIGHT: 148.6 LBS | BODY MASS INDEX: 28.05 KG/M2 | HEIGHT: 61 IN | OXYGEN SATURATION: 97 % | SYSTOLIC BLOOD PRESSURE: 162 MMHG | HEART RATE: 71 BPM

## 2023-03-20 DIAGNOSIS — R55 SYNCOPE, UNSPECIFIED SYNCOPE TYPE: Primary | ICD-10-CM

## 2023-03-20 DIAGNOSIS — C79.51 SPINE METASTASIS: ICD-10-CM

## 2023-03-20 DIAGNOSIS — I10 PRIMARY HYPERTENSION: Chronic | ICD-10-CM

## 2023-03-20 DIAGNOSIS — G89.3 CANCER ASSOCIATED PAIN: Primary | ICD-10-CM

## 2023-03-20 DIAGNOSIS — C50.919 MALIGNANT NEOPLASM OF FEMALE BREAST, UNSPECIFIED ESTROGEN RECEPTOR STATUS, UNSPECIFIED LATERALITY, UNSPECIFIED SITE OF BREAST: ICD-10-CM

## 2023-03-20 DIAGNOSIS — C79.51 BONE METASTASIS: ICD-10-CM

## 2023-03-20 DIAGNOSIS — G89.3 CANCER ASSOCIATED PAIN: ICD-10-CM

## 2023-03-20 PROCEDURE — 62370 ANL SP INF PMP W/MDREPRG&FIL: CPT | Performed by: STUDENT IN AN ORGANIZED HEALTH CARE EDUCATION/TRAINING PROGRAM

## 2023-03-20 NOTE — PROCEDURES
Intrathecal Pump Reprogram    PREOPERATIVE DIAGNOSIS:  Presence of IDDS system.  Cancer pain.    POSTOPERATIVE DIAGNOSIS:  Same as preop diagnosis    PROCEDURE:   Intrathecal pump Refill / Reprogram requiring MD expertise, and requirement for fluoroscopic guidance.  CPT 55137    IDDS System:   Medtronic Synchromed II      PRE-PROCEDURE DISCUSSION WITH PATIENT:    Risks and complications were discussed with the patient prior to starting the procedure and informed consent was obtained.  We discussed various topics including but not limited to bleeding, infection, injury, nerve injury, paralysis, coma, overdose, reaction to injectate and/or medication, death, postprocedural painful flare-up, postprocedural site soreness, and a lack of pain relief resulting from the procedure or the knowledge gained from the procedure, and a risk of equipment malfunction or damage.        SURGEON:  Chuck Hunter MD    REASON FOR PROCEDURE:     Inadequate pain relief    SEDATION:  Patient declined administration of moderate sedation      LOCAL ANESTHETICS:  NONE,     DESCRIPTON OF PROCEDURE:  The patient taken to the operating room and was placed in the standing  position.  All pressure points were well padded.     The pump was interrogated.  The pump is currently running a solution of Morphine at a concentration of 1mg/ml and at a dose of 0.2 on the basal infusion.  In addition, a PTM dose was not programmed.    The infusion program was changed.      Basal Rate: 0.300    Refill Date: 4/7/23    ESTIMATED BLOOD LOSS:  none  SPECIMENS:  none    COMPLICATIONS:   No complications were noted.    TOLERANCE & DISCHARGE CONDITION:    The patient tolerated the procedure well.  Pump site is intact with minimal tenderness, and no erythema nor drainage.  The patient was transported to the recovery area without difficulties.  The patient was discharged to home under the care of family in stable and satisfactory condition.      PLAN OF  CARE:  1. The patient was given our standard instruction sheet.  2. The patient will Plan for refill prior to refill date.  3. The patient will resume all medications as per the medication reconciliation sheet.        4.   Patient will return for pump refill when due or sooner if needed.

## 2023-03-20 NOTE — PROGRESS NOTES
Transitional Care Follow Up Visit  Subjective     Gladys Garrison is a 62 y.o. female who presents for a transitional care management visit.    Within 48 business hours after discharge our office contacted her via telephone to coordinate her care and needs.      I reviewed and discussed the details of that call along with the discharge summary, hospital problems, inpatient lab results, inpatient diagnostic studies, and consultation reports with Gladys.     Current outpatient and discharge medications have been reconciled for the patient.  Reviewed by: Chirag Robles DO    Date of TCM Phone Call 3/8/2023   [facility]   Date of Admission 3/7/2023   Date of Discharge 3/8/2023   Discharge Disposition Home or Self Care     Risk for Readmission (LACE) Score: 8 (3/8/2023  6:00 AM)    History of Present Illness   Course During Hospital Stay:  Hospitalized 3/7 - 3/8 for syncope.  Serial troponins decreased.  Lab work was otherwise unremarkable.  CXR showed enlarged cardiac silouette.  Pacemaker was interrogated and unremarkable.  Cardiology was consulted, recommended gentle fluid bolus and echocardiogram.  Echocardiogram showed EF of 56-60%, severely reduced R ventricular systolic function, R atria and ventricle dilatrion, mitral valve thickening, severe tricuspic valve regurgitation and trivial pericardial effusion.     Her cancer related pain is worsening.  She typically sees Dr. Hunter for pain management.  She has an appointment with him this afternoon.  She continues to see Dr. Nunez for her metastatic breast cancer and is trying different medications for treatment.       The following portions of the patient's history were reviewed and updated as appropriate: allergies, current medications, past family history, past medical history, past social history, past surgical history and problem list.    Objective    Vitals:    03/20/23 1116   BP: 162/90   Pulse: 71   SpO2: 97%     GEN: In no acute  distress, non toxic appearing  CV: Regular rate and rhythm, no murmurs, 2+ peripheral pulses, No extremity edema.   RESP: Lungs clear to auscultation anteriorly and posteriorly in all lung fields bilaterally.  NEURO: AAO to person, place, and time. CN 2-12 intact grossly.   PSYCH: Affect normal, insight fair    Assessment & Plan   Diagnoses and all orders for this visit:    1. Syncope, unspecified syncope type (Primary)  Workup largely unremarkable.  Could be related to pain, cancer, heart disease.  Continue close f/u with pain management, oncology and cardiology.    2. Malignant neoplasm of female breast, unspecified estrogen receptor status, unspecified laterality, unspecified site of breast (HCC)  See above    3. Bone metastasis (HCC)  See above    4. Cancer associated pain  See above    5. Primary hypertension  Possibly 2/2 pain or anxiety, continue to monitor closely.  Continue lisinopril 20 mg daily.

## 2023-03-20 NOTE — TELEPHONE ENCOUNTER
PT CALLED OFFICE THIS MORNING. SPOKE W  AND HE ADVISED TO COME TO HOSPITAL TODAY. SCHEDULED APPT FOR PT TO SEE DR HELTON.

## 2023-03-21 ENCOUNTER — TELEPHONE (OUTPATIENT)
Dept: PAIN MEDICINE | Facility: HOSPITAL | Age: 63
End: 2023-03-21
Payer: MEDICARE

## 2023-03-29 ENCOUNTER — HOSPITAL ENCOUNTER (EMERGENCY)
Facility: HOSPITAL | Age: 63
Discharge: LEFT BEFORE DISCHARGE | End: 2023-03-29
Attending: EMERGENCY MEDICINE | Admitting: EMERGENCY MEDICINE
Payer: MEDICARE

## 2023-03-29 ENCOUNTER — APPOINTMENT (OUTPATIENT)
Dept: CT IMAGING | Facility: HOSPITAL | Age: 63
End: 2023-03-29
Payer: MEDICARE

## 2023-03-29 VITALS
HEIGHT: 61 IN | OXYGEN SATURATION: 99 % | WEIGHT: 145 LBS | SYSTOLIC BLOOD PRESSURE: 165 MMHG | RESPIRATION RATE: 10 BRPM | BODY MASS INDEX: 27.38 KG/M2 | TEMPERATURE: 98.3 F | DIASTOLIC BLOOD PRESSURE: 83 MMHG | HEART RATE: 71 BPM

## 2023-03-29 DIAGNOSIS — R55 SYNCOPE AND COLLAPSE: Primary | ICD-10-CM

## 2023-03-29 DIAGNOSIS — R59.0 CERVICAL LYMPHADENOPATHY: ICD-10-CM

## 2023-03-29 LAB
ALBUMIN SERPL-MCNC: 4.3 G/DL (ref 3.5–5.2)
ALBUMIN/GLOB SERPL: 1.3 G/DL
ALP SERPL-CCNC: 84 U/L (ref 39–117)
ALT SERPL W P-5'-P-CCNC: 50 U/L (ref 1–33)
ANION GAP SERPL CALCULATED.3IONS-SCNC: 12 MMOL/L (ref 5–15)
AST SERPL-CCNC: 39 U/L (ref 1–32)
BASOPHILS # BLD AUTO: 0 10*3/MM3 (ref 0–0.2)
BASOPHILS NFR BLD AUTO: 0.5 % (ref 0–1.5)
BILIRUB SERPL-MCNC: 0.5 MG/DL (ref 0–1.2)
BUN SERPL-MCNC: 12 MG/DL (ref 8–23)
BUN/CREAT SERPL: 19 (ref 7–25)
CALCIUM SPEC-SCNC: 9.8 MG/DL (ref 8.6–10.5)
CHLORIDE SERPL-SCNC: 104 MMOL/L (ref 98–107)
CO2 SERPL-SCNC: 24 MMOL/L (ref 22–29)
CREAT SERPL-MCNC: 0.63 MG/DL (ref 0.57–1)
DEPRECATED RDW RBC AUTO: 42.9 FL (ref 37–54)
EGFRCR SERPLBLD CKD-EPI 2021: 101.7 ML/MIN/1.73
EOSINOPHIL # BLD AUTO: 0.1 10*3/MM3 (ref 0–0.4)
EOSINOPHIL NFR BLD AUTO: 1.4 % (ref 0.3–6.2)
ERYTHROCYTE [DISTWIDTH] IN BLOOD BY AUTOMATED COUNT: 13.2 % (ref 12.3–15.4)
GLOBULIN UR ELPH-MCNC: 3.3 GM/DL
GLUCOSE SERPL-MCNC: 89 MG/DL (ref 65–99)
HCT VFR BLD AUTO: 37.7 % (ref 34–46.6)
HGB BLD-MCNC: 12.5 G/DL (ref 12–15.9)
LYMPHOCYTES # BLD AUTO: 1.4 10*3/MM3 (ref 0.7–3.1)
LYMPHOCYTES NFR BLD AUTO: 24.2 % (ref 19.6–45.3)
MAGNESIUM SERPL-MCNC: 1.9 MG/DL (ref 1.6–2.4)
MCH RBC QN AUTO: 28.7 PG (ref 26.6–33)
MCHC RBC AUTO-ENTMCNC: 33.1 G/DL (ref 31.5–35.7)
MCV RBC AUTO: 86.8 FL (ref 79–97)
MONOCYTES # BLD AUTO: 0.3 10*3/MM3 (ref 0.1–0.9)
MONOCYTES NFR BLD AUTO: 6 % (ref 5–12)
NEUTROPHILS NFR BLD AUTO: 3.8 10*3/MM3 (ref 1.7–7)
NEUTROPHILS NFR BLD AUTO: 67.9 % (ref 42.7–76)
NRBC BLD AUTO-RTO: 0.1 /100 WBC (ref 0–0.2)
NT-PROBNP SERPL-MCNC: 843.2 PG/ML (ref 0–900)
PLATELET # BLD AUTO: 73 10*3/MM3 (ref 140–450)
PMV BLD AUTO: 8.8 FL (ref 6–12)
POTASSIUM SERPL-SCNC: 3.9 MMOL/L (ref 3.5–5.2)
PROT SERPL-MCNC: 7.6 G/DL (ref 6–8.5)
RBC # BLD AUTO: 4.35 10*6/MM3 (ref 3.77–5.28)
SODIUM SERPL-SCNC: 140 MMOL/L (ref 136–145)
TROPONIN T SERPL HS-MCNC: 12 NG/L
TSH SERPL DL<=0.05 MIU/L-ACNC: 1.26 UIU/ML (ref 0.27–4.2)
WBC NRBC COR # BLD: 5.7 10*3/MM3 (ref 3.4–10.8)

## 2023-03-29 PROCEDURE — 70450 CT HEAD/BRAIN W/O DYE: CPT

## 2023-03-29 PROCEDURE — 93005 ELECTROCARDIOGRAM TRACING: CPT | Performed by: EMERGENCY MEDICINE

## 2023-03-29 PROCEDURE — 36415 COLL VENOUS BLD VENIPUNCTURE: CPT

## 2023-03-29 PROCEDURE — 83880 ASSAY OF NATRIURETIC PEPTIDE: CPT | Performed by: PHYSICIAN ASSISTANT

## 2023-03-29 PROCEDURE — 72125 CT NECK SPINE W/O DYE: CPT

## 2023-03-29 PROCEDURE — 84443 ASSAY THYROID STIM HORMONE: CPT | Performed by: PHYSICIAN ASSISTANT

## 2023-03-29 PROCEDURE — 83735 ASSAY OF MAGNESIUM: CPT | Performed by: PHYSICIAN ASSISTANT

## 2023-03-29 PROCEDURE — 80053 COMPREHEN METABOLIC PANEL: CPT | Performed by: PHYSICIAN ASSISTANT

## 2023-03-29 PROCEDURE — 85025 COMPLETE CBC W/AUTO DIFF WBC: CPT | Performed by: PHYSICIAN ASSISTANT

## 2023-03-29 PROCEDURE — 93005 ELECTROCARDIOGRAM TRACING: CPT

## 2023-03-29 PROCEDURE — 99284 EMERGENCY DEPT VISIT MOD MDM: CPT

## 2023-03-29 PROCEDURE — 63710000001 ONDANSETRON ODT 4 MG TABLET DISPERSIBLE: Performed by: PHYSICIAN ASSISTANT

## 2023-03-29 PROCEDURE — 84484 ASSAY OF TROPONIN QUANT: CPT | Performed by: PHYSICIAN ASSISTANT

## 2023-03-29 RX ORDER — ONDANSETRON 2 MG/ML
4 INJECTION INTRAMUSCULAR; INTRAVENOUS ONCE
Status: DISCONTINUED | OUTPATIENT
Start: 2023-03-29 | End: 2023-03-29

## 2023-03-29 RX ORDER — ONDANSETRON 4 MG/1
4 TABLET, ORALLY DISINTEGRATING ORAL ONCE
Status: COMPLETED | OUTPATIENT
Start: 2023-03-29 | End: 2023-03-29

## 2023-03-29 RX ORDER — SODIUM CHLORIDE 0.9 % (FLUSH) 0.9 %
10 SYRINGE (ML) INJECTION AS NEEDED
Status: DISCONTINUED | OUTPATIENT
Start: 2023-03-29 | End: 2023-03-29 | Stop reason: HOSPADM

## 2023-03-29 RX ADMIN — ONDANSETRON 4 MG: 4 TABLET, ORALLY DISINTEGRATING ORAL at 15:13

## 2023-03-29 NOTE — ED NOTES
PCT  and nurse  went into room for repeat troponin blood draw, pt stated that she did  not  need a repeat draw, that her troponin levels were elevated the  last time this  happened and there is nothing wrong with her heart. Pt also states that she wants to go home and she knows how to watch herself for any problems with her heart. She reports she is already being followed by a cardiologist. ED  provider notified and instructed patient that she did not feel comfortable sending her home with an elevated troponin level. Pt  stated she was going  to go home anyway and she would  monitor herself and  she again stated she knew the signs to  look for. Pt requesting to  sign AMA papers and to leave.

## 2023-03-29 NOTE — Clinical Note
[facility] EMERGENCY DEPARTMENT  [address] [place] [address]  Phone: [phone]    Gladys Garrison was seen and treated in our emergency department on 3/29/2023.  She may return to work on 03/30/2023.         Thank you for choosing [facility].    Dedra Box PA

## 2023-03-29 NOTE — ED PROVIDER NOTES
Subjective   History of Present Illness  Chief Complaint: Syncope    Patient is a 62-year-old  female with history of breast cancer with bone metastases, diabetes, CAD, hypertension presents to the ER with complaints of syncopal episode today while at work.  Patient states that she works at [employer], states that she does not get very many breaks to check her sugar or eat.  Patient states that she started to feel lightheaded like her sugar was dropping, but reports passing out before being able to eat something.  Patient states that she was given a candy bar by her manager, she was more awake and alert by the time EMS arrived and her sugar at that time was 90.  Patient states she was told she did not hit her head.  Denies chest pain or headache.  No abdominal pain.  She does complain of some nausea.  Patient does report some left-sided neck pain that started before she had the syncopal episode.  She still complaining of some neck pain on the left side that does not radiate that she rates as moderate intensity.  No radiating pain, no numbness or tingling in upper or lower extremities.  No neck stiffness.  No fever chills.    PCP: Chirag Robles    History provided by:  Patient      Review of Systems   Constitutional: Negative for chills and fever.   HENT: Negative for sore throat and trouble swallowing.    Eyes: Negative.    Respiratory: Negative for shortness of breath and wheezing.    Cardiovascular: Negative for chest pain.   Gastrointestinal: Positive for nausea. Negative for abdominal pain, diarrhea and vomiting.   Endocrine: Negative.    Genitourinary: Negative for dysuria.   Musculoskeletal: Positive for neck pain.   Skin: Negative for rash.   Allergic/Immunologic: Negative.    Neurological: Positive for syncope. Negative for dizziness, light-headedness, numbness and headaches.   Psychiatric/Behavioral: Negative for behavioral problems.   All other systems reviewed and are negative.      Past Medical  History:   Diagnosis Date   • Allergic    • Bone cancer (HCC)     METASTATIC BONE   • Bradycardia     SECONDARY TO ABLATION   • Breast cancer (HCC) 2017    mets to lymph nodes; did not do radiation   • Cancer of unknown origin (HCC)    • Compression fx, thoracic spine, open, initial encounter (HCC)    • Coronary artery disease    • COVID-19 2021   • Diabetes mellitus (HCC)    • Heart disease, unspecified    • Hepatitis C     RESOLVED WITH MEDICATION   • Hyperlipidemia    • Hypertension    • Obesity (BMI 30-39.9) 2021   • Rib fracture     Per patient   • Sleep apnea     no machine   • Tachycardia     ATRIAL   • Type 2 diabetes mellitus (HCC) 2017       Allergies   Allergen Reactions   • Promethazine Other (See Comments)     Hyperactive mean   • Tape Other (See Comments)     .blisters         Past Surgical History:   Procedure Laterality Date   • BACK SURGERY      neck X 2   • BREAST RECONSTRUCTION Bilateral    • CARDIAC ABLATION       atrial tachycardia x 5 ablations    • CARDIAC CATHETERIZATION     • CARDIAC SURGERY      stent placed in aorta   • CARDIAC SURGERY      6 surgeries as baby    • CERVICAL FUSION ANTERIOR WITH ARTIFICIAL DISCECTOMY IMPLANTATION N/A 2020    Procedure: C4 VERTEBRECTOMY AND ANTERIOR CERIVCAL DISCECTOMY WITH FUSION OF CERVICAL THREE THROUGH FIVE WITH REMOVAL OF HARDWARE C5-C6;  Surgeon: Mark Kaba MD;  Location: [facility] MAIN OR;  Service: Neurosurgery;  Laterality: N/A;   •  SECTION      x2   • COLONOSCOPY N/A 10/23/2020    Procedure: COLONOSCOPY WITH POLYPECTOMY X6;  Surgeon: Stanley Bourne MD;  Location: [facility] ENDOSCOPY;  Service: Gastroenterology;  Laterality: N/A;  POLYPS, INTERNAL HEMORRHOIDS   • ENDOMETRIAL ABLATION      REMOVAL SCAR TISSUE UTERINE   • ENDOSCOPY N/A 10/23/2020    Procedure: ESOPHAGOGASTRODUODENOSCOPY WITH BIOPSY X 1 AREA;  Surgeon: Stanley Bourne MD;  Location: [facility] ENDOSCOPY;  Service: Gastroenterology;   Laterality: N/A;  GASTRITIS, ESOPHAGITIS, HIATAL HERNIA   • KNEE SURGERY  2000   • MASTECTOMY Bilateral    • NECK SURGERY     • PACEMAKER IMPLANTATION     • PAIN PUMP INSERTION/REVISION N/A 4/5/2022    Procedure: PAIN PUMP INSERTION AND INTRATHECAL CATHETER PLACEMENT;  Surgeon: Chuck Hunter MD;  Location: [facility] MAIN OR;  Service: Pain Management;  Laterality: N/A;       Family History   Problem Relation Age of Onset   • Heart disease Mother    • Stroke Mother    • Lung cancer Mother    • Aneurysm Father    • Diabetes Sister    • No Known Problems Brother    • No Known Problems Brother    • Diabetes type I Half-Sister    • Thyroid cancer Half-Sister    • Cancer Maternal Aunt    • Heart attack Sister    • Thyroid disease Sister    • No Known Problems Sister        Social History     Socioeconomic History   • Marital status:    • Number of children: 2   Tobacco Use   • Smoking status: Never     Passive exposure: Never   • Smokeless tobacco: Never   Vaping Use   • Vaping Use: Never used   Substance and Sexual Activity   • Alcohol use: Yes     Alcohol/week: 1.0 standard drink     Types: 1 Glasses of wine per week     Comment: mixed drink once a week   • Drug use: Not Currently   • Sexual activity: Defer           Objective   Physical Exam  Vitals and nursing note reviewed.   Constitutional:       Appearance: Normal appearance. She is well-developed. She is not ill-appearing or toxic-appearing.   HENT:      Head: Normocephalic and atraumatic.      Nose: Nose normal. No congestion.      Mouth/Throat:      Mouth: Mucous membranes are moist.      Pharynx: No oropharyngeal exudate.   Eyes:      Pupils: Pupils are equal, round, and reactive to light.   Neck:      Comments: Left lateral neck tenderness, with mild cervical adenopathy on the left side versus the right  Cardiovascular:      Rate and Rhythm: Normal rate and regular rhythm.      Pulses: Normal pulses.      Heart sounds: Normal heart sounds. No murmur  heard.  Pulmonary:      Effort: Pulmonary effort is normal. No respiratory distress.      Breath sounds: Normal breath sounds. No wheezing.   Abdominal:      General: Bowel sounds are normal. There is no distension.      Palpations: Abdomen is soft.      Tenderness: There is no abdominal tenderness. There is no right CVA tenderness or left CVA tenderness.   Musculoskeletal:         General: Normal range of motion.      Cervical back: Normal range of motion. Tenderness present.   Lymphadenopathy:      Cervical: Cervical adenopathy present.   Skin:     General: Skin is warm and dry.      Capillary Refill: Capillary refill takes less than 2 seconds.      Findings: No bruising or rash.   Neurological:      General: No focal deficit present.      Mental Status: She is alert and oriented to person, place, and time.      Cranial Nerves: No cranial nerve deficit.      Motor: No weakness.   Psychiatric:         Mood and Affect: Mood normal.         Behavior: Behavior normal.         ECG 12 Lead      Date/Time: 3/29/2023 4:42 PM  Performed by: Dedra Box PA  Authorized by: Ki Palacios MD   Interpreted by physician  Comparison: compared with previous ECG   Similar to previous ECG  Rhythm: sinus rhythm and paced  Rate: normal  BPM: 70  QRS axis: normal  Conduction: conduction normal  ST Segments: ST segments normal  T Waves: T waves normal  Other: no other findings  Clinical impression: non-specific ECG                 ED Course  ED Course as of 03/29/23 1854   Wed Mar 29, 2023   1459 2D echo 3/10/2023    Left ventricular systolic function is normal. Left ventricular ejection fraction appears to be 56 - 60%.  ·  Left ventricular diastolic function is consistent with (grade Ia w/high LAP) impaired relaxation.  ·  Severely reduced right ventricular systolic function noted.  ·  The right ventricular cavity is moderately dilated.  ·  The right atrial cavity is moderate to severely  dilated.  ·  There is moderate,  "bileaflet mitral valve thickening present.  ·  Severe tricuspid valve regurgitation is present.  ·  Estimated right ventricular systolic pressure from tricuspid regurgitation is mildly elevated (35-45 mmHg).  ·  Mild pulmonary hypertension is present.  ·  There is a trivial pericardial effusion. There is no evidence of cardiac tamponade.   [MC]   1626 Orthostatic vitals within normal limits [MC]   1645 Care assumed from SUDARSHAN Jackson pending repeat troponin, pacer interrogation and disposition. [MD]   1853 I was notified by nursing staff that patient did not want to wait for repeat troponin to be drawn and signed out AMA. [MD]      ED Course User Index  [MC] Dedra Box PA  [MD] Danette Harris APRN    /83 (Patient Position: Standing)   Pulse 71   Temp 98.3 °F (36.8 °C) (Oral)   Resp 10   Ht 154.9 cm (61\")   Wt 65.8 kg (145 lb)   LMP  (LMP Unknown)   SpO2 99%   BMI 27.40 kg/m²   Labs Reviewed   COMPREHENSIVE METABOLIC PANEL - Abnormal; Notable for the following components:       Result Value    ALT (SGPT) 50 (*)     AST (SGOT) 39 (*)     All other components within normal limits    Narrative:     GFR Normal >60  Chronic Kidney Disease <60  Kidney Failure <15     CBC WITH AUTO DIFFERENTIAL - Abnormal; Notable for the following components:    Platelets 73 (*)     All other components within normal limits   SINGLE HSTROPONIN T - Abnormal; Notable for the following components:    HS Troponin T 12 (*)     All other components within normal limits    Narrative:     High Sensitive Troponin T Reference Range:  <10.0 ng/L- Negative Female for AMI  <15.0 ng/L- Negative Male for AMI  >=10 - Abnormal Female indicating possible myocardial injury.  >=15 - Abnormal Male indicating possible myocardial injury.   Clinicians would have to utilize clinical acumen, EKG, Troponin, and serial changes to determine if it is an Acute Myocardial Infarction or myocardial injury due to an underlying chronic condition.      "   BNP (IN-HOUSE) - Normal    Narrative:     Among patients with dyspnea, NT-proBNP is highly sensitive for the detection of acute congestive heart failure. In addition NT-proBNP of <300 pg/ml effectively rules out acute congestive heart failure with 99% negative predictive value.    Results may be falsely decreased if patient taking Biotin.     MAGNESIUM - Normal   TSH - Normal   SINGLE HSTROPONIN T   CBC AND DIFFERENTIAL    Narrative:     The following orders were created for panel order CBC & Differential.  Procedure                               Abnormality         Status                     ---------                               -----------         ------                     CBC Auto Differential[[id number]]        Abnormal            Final result                 Please view results for these tests on the individual orders.     Medications   sodium chloride 0.9 % flush 10 mL (has no administration in time range)   ondansetron ODT (ZOFRAN-ODT) disintegrating tablet 4 mg (4 mg Oral Given 3/29/23 1513)     CT Head Without Contrast    Result Date: 3/29/2023  Impression: Normal noncontrast CT of the brain Electronically Signed: Christ Dowling  3/29/2023 4:07 PM EDT  Workstation ID: [id number]    CT Cervical Spine Without Contrast    Result Date: 3/29/2023  Impression: 1. Negative for cervical spine fracture. 2. Intact cervical ACDF at C3-C6. Electronically Signed: Westley Kirkpatrick  3/29/2023 4:19 PM EDT  Workstation ID: [id number]                                           Medical Decision Making  Differential Dx (Includes but not limited to): Syncope, brain tumor, metastases, dysrhythmia, hypoglycemia  Medical Records Reviewed: Patient admitted approximately 3 weeks ago for similar scenario with complaints.  Patient had syncopal episode at work, was placed in the ED observation unit for further evaluation, had 2D echo showing ejection fraction 50 to 60%    Patient had follow-up with PCP following her admission on 3/20/2023,  unremarkable.  Patient is follow-up with Dr. Lilly 4/20/2023  Labs: On my interpretation CMP ALT 50, AST 39.  Initial troponin 12.  BNP, magnesium, TSH unremarkable.  Imaging: On my interpretation CT head shows no acute intracranial hemorrhage, brain tumor or metastases.  CT cervical spine shows no obvious cervical spine fracture or pathological fracture  Telemetry: EKG interpretation: Reviewed by myself interpreted by ER attending, AV paced rhythm rate of 70, similar to previous  Testing considered but not ordered: CT abdomen pelvis, but denies any abdominal pain vomiting diarrhea  Nature of Complaint: Acute  Admission vs Discharge:   Discussion: While in the ED IV was placed and labs were obtained appropriate PPE was worn during exam and throughout all encounters with the patient.  Patient had the above evaluation IV established, lab work obtained.  Patient given Zofran for nausea.  Patient awake alert and oriented x4 with no focal neurological deficits.  Patient only complains of some left-sided neck pain that she states she woke up with.  EKG unremarkable.  Orthostatics unremarkable.  Lab work unremarkable with exception of troponin is 12.  CT head shows no acute intracranial findings.  CT cervical spine shows no acute osseous abnormalities spine fracture or metastases.  There are some nonspecific scattered left lower cervical chain lymph nodes that are borderline enlarged that could account for patient's left-sided neck pain.    Patient care transferred to Veronica Harris NP pending second troponin and pacemaker interrogation.  If both of these results are within normal limits, or unchanged patient may be discharged home.  She has appointment with cardiology on 4/3/2023.  Patient states that she would prefer to be discharged home.    Amount and/or Complexity of Data Reviewed  External Data Reviewed: radiology and notes.  Labs: ordered. Decision-making details documented in ED Course.  Radiology: ordered.  Decision-making details documented in ED Course.  ECG/medicine tests: ordered. Decision-making details documented in ED Course.      Risk  Prescription drug management.          Final diagnoses:   Syncope and collapse   Cervical lymphadenopathy       ED Disposition  ED Disposition     ED Disposition   AMA    Condition   --    Comment   See note in narrarator charting             Chirag Robles, DO  [address]  [place] [id number] [phone]    Schedule an appointment as soon as possible for a visit in 2 days  As needed, If symptoms worsen    Stanley Lopez MD  [facility] [address] [place] [address]  [phone]    Go on 4/3/2023  As needed, If symptoms worsen         Medication List      No changes were made to your prescriptions during this visit.          Danette Harris, APRN  03/29/23 [id number]

## 2023-03-29 NOTE — DISCHARGE INSTRUCTIONS
Take Tylenol as needed for headache  Carry snacks or candy bars with you if you start to feel lightheaded or like her sugar is dropping    Follow-up with primary care for recheck  Follow-up with cardiology as scheduled on Monday    Return to the ER for new or worsening symptoms

## 2023-03-29 NOTE — ED NOTES
AMA papers reviewed with patient with pt voicing understanding. Pt signed papers and IV was removed.

## 2023-04-01 LAB — QT INTERVAL: 478 MS

## 2023-04-03 ENCOUNTER — HOSPITAL ENCOUNTER (OUTPATIENT)
Dept: PAIN MEDICINE | Facility: HOSPITAL | Age: 63
Discharge: HOME | End: 2023-04-03
Payer: MEDICARE

## 2023-04-03 VITALS
HEART RATE: 86 BPM | WEIGHT: 145 LBS | BODY MASS INDEX: 27.38 KG/M2 | OXYGEN SATURATION: 95 % | HEIGHT: 61 IN | TEMPERATURE: 97.1 F | DIASTOLIC BLOOD PRESSURE: 66 MMHG | SYSTOLIC BLOOD PRESSURE: 164 MMHG | RESPIRATION RATE: 16 BRPM

## 2023-04-03 DIAGNOSIS — G89.3 CANCER ASSOCIATED PAIN: Primary | ICD-10-CM

## 2023-04-03 DIAGNOSIS — R52 PAIN: ICD-10-CM

## 2023-04-03 PROCEDURE — 62370 ANL SP INF PMP W/MDREPRG&FIL: CPT | Performed by: STUDENT IN AN ORGANIZED HEALTH CARE EDUCATION/TRAINING PROGRAM

## 2023-04-03 PROCEDURE — 77003 FLUOROGUIDE FOR SPINE INJECT: CPT

## 2023-04-03 RX ORDER — CEPHALEXIN 500 MG/1
1 CAPSULE ORAL EVERY 12 HOURS SCHEDULED
COMMUNITY
Start: 2023-04-01

## 2023-04-03 NOTE — PROCEDURES
Intrathecal Pump Refill / Reprogram with Fluoroscopic Guidance:    PREOPERATIVE DIAGNOSIS:  Presence of IDDS system.  Cancer pain.    POSTOPERATIVE DIAGNOSIS:  Same as preop diagnosis    PROCEDURE:   Intrathecal pump Refill / Reprogram requiring MD expertise, and requirement for fluoroscopic guidance.  CPT 63945, 29409    IDDS System:   Medtronic Synchromed II      PRE-PROCEDURE DISCUSSION WITH PATIENT:    Risks and complications were discussed with the patient prior to starting the procedure and informed consent was obtained.  We discussed various topics including but not limited to bleeding, infection, injury, nerve injury, paralysis, coma, overdose, reaction to injectate and/or medication, death, postprocedural painful flare-up, postprocedural site soreness, and a lack of pain relief resulting from the procedure or the knowledge gained from the procedure, and a risk of equipment malfunction or damage.        SURGEON:  Chuck Hunter MD    REASON FOR PROCEDURE:     The intrathecal pump could not be filled with a blind percutaneous technique in the office.  After multiple attempts, the procedure in the office was aborted and the patient was scheduled for image-guided refill for patient safety.      SEDATION:  Patient declined administration of moderate sedation      LOCAL ANESTHETICS:  NONE,     DESCRIPTON OF PROCEDURE:  The patient taken to the operating room and was placed in the prone  position.  All pressure points were well padded.     The pump was interrogated.  The pump is currently running a solution of Morphine at a concentration of 1mg/ml and at a dose of 0.300 on the basal infusion.  In addition, a PTM dose was not programmed.    The site of the pump was identified.  The appropriate area was prepped with Chloraprep and draped in a sterile fashion.    The area overlying the central port was not anesthetized with subcutaneous solution of local anesthetic.  Fluoroscopy was utilized to visualize the  orientation of the pump in its pocket.  The proprietary pump refill kit was used to puncture the skin and enter into the reservoir access port.      Aspiration was attempted and successful.   The Expected Residual Volume (ERV) was 3.2.  The Actual Residual Volume aspirated was 2.5.  This amount was discarded.       The pump was then refilled with Morphine 1mg/ml.  The infusion program was not changed.      Basal Rate: 0.300    Refill Date: 6/1/23    The needle was removed intact.  Vital signs were stable throughout.        ESTIMATED BLOOD LOSS:  none  SPECIMENS:  none    COMPLICATIONS:   No complications were noted.    TOLERANCE & DISCHARGE CONDITION:    The patient tolerated the procedure well.  Pump site is intact with minimal tenderness, and no erythema nor drainage.  The patient was transported to the recovery area without difficulties.  The patient was discharged to home under the care of family in stable and satisfactory condition.      PLAN OF CARE:  1. The patient was given our standard instruction sheet.  2. The patient will Plan for refill prior to refill date.  3. The patient will resume all medications as per the medication reconciliation sheet.        4.   Patient will return for pump refill when due or sooner if needed.

## 2023-04-04 ENCOUNTER — TELEPHONE (OUTPATIENT)
Dept: PAIN MEDICINE | Facility: HOSPITAL | Age: 63
End: 2023-04-04
Payer: MEDICARE

## 2023-04-04 NOTE — PROGRESS NOTES
Hematology/Oncology Outpatient Follow Up    PATIENT NAME:Gladys Garrison  :1960  MRN: [id number]  PRIMARY CARE PHYSICIAN: Chirag Robles DO  REFERRING PHYSICIAN: Chirag Robles, *    Chief Complaint   Patient presents with   • Follow-up     Malignant neoplasm of female breast, unspecified estrogen receptor status, unspecified laterality, unspecified site of breast        HISTORY OF PRESENT ILLNESS:     This is a 62-year-old female who multiple comorbidities including congenital abnormalities such as hypoplastic kidney, tetralogy of Fallot, coarctation of the aorta and congenital VSD status post repair.  Patient developed syncopal episode and for that reason she had she had a CT scan of the chest which showed mass in the left breast.  She had diagnostic mammogram and ultrasound which showed a 2 cm spiculated mass in the left breast at the 1 o'clock position 6 cm from the nipple.  Biopsy of the left breast mass revealed invasive moderately differentiated carcinoma ER/VA positive and HER-2/bishop negative.  Patient also had an ultrasound of the axilla which was concerning for an abnormal left axillary lymph node with cortical thickening. She apparently had an FNA of the left axillary nodule which was positive for malignancy.  On 2017 patient underwent left modified radical mastectomy, right prophylactic mastectomy and immediate breast reconstruction. She also underwent right prophylactic mastectomy.  We have had her on records suggest that patient did have multifocal disease with pT2 pN1 aM0.  The largest focus measured 3.5 cm.  2 of 11 lymph nodes were positive for metastatic disease some with extracapsular extension.  Notes that patient did receive Arimidex neoadjuvant  from 2017 to 2018 prior to her bilateral mastectomies.    Review of her note suggest that she developed cough which she attributed to anastrozole and patient was then placed on tamoxifen.  She had MammaPrint  testing which returned with low risk for relapse.    Her postop course was also complicated by left chest wall abscess which resulted in I&D and removal of the left tissue expander. She was referred for radiation treatment boards ultimately declined.    Patient was then placed on tamoxifen in April 2018 which she stopped after less than a month due to nausea and vomiting.  Patient in the interim also was diagnosed with chronic hepatitis C was seen by the hepatologist.  Tamoxifen was dose reduced to 10 mg daily with the goal to increase to 20 mg daily.      Patient has relocated to [facility].  She has transitioned her care to us now.  She is currently not on any antiestrogen therapy.     Her bilateral mastectomy specimen are available for review    · 1/29/2021 patient had bone density which showed osteoporosis  · Patient was seen by neurosurgery and had a bone scan done in March 2021 which showed subtle activity in the inferior L4 vertebral body appears to correlate with new area of sclerosis on CT of the abdomen and pelvis.  There is also subtle activity with possible new lesion at the posterior left sacrum.  These findings were concerning for metastatic disease.  PET CT scan was recommended to further evaluate.  · 4/8/2021 patient had a PET CT scan which showed evidence of disease in the neck chest abdomen and pelvis.  But she has a 1.1 cm mixed lytic/sclerotic hypermetabolic lesion within the left hemisacrum consistent with metastatic disease.  There is also accumulation within the inferior endplate of the L4 vertebral body thought to correspond to 1.2 centimeters sclerotic lesion consistent with metastatic disease.  There is a tiny hypermetabolic focus within the L3, T11.  Suspicious for also early metastatic disease.  · 4/26/2021 patient underwent CT-guided biopsy of sacral lesion, pathology was consistent with metastatic breast cancer ER and OK positive HER-2/bishop was negative.  · 7/6/21 CT new sclerosis  within the right 12th rib posteriorly which could represent metastatic lesion or a healed or healing fracture, new sclerosis within the right 12th rib posteriorly which         could represent metastatic lesion,new sclerosis within the right T8 transverse process worrisome for metastatic        disease progression.  · 7/7/21 complaints of 8/10 back pain and 8/10 LUQ pain. Referral to radiation for questionable mets on CT chest.  · 7/26/2021 patient had CT scan of the head with contrast with did not show any evidence of metastatic disease.  CT scan of the chest abdomen and pelvis did not show any evidence of progressive disease.  · 7/26/2021 patient had CEA level which shows declining values CA 15-3 has decreased to 62 from 84  · 11/3/2021: Patient had CT scan of the chest, abdomen and pelvis. In the chest there were no suspicious pulmonary nodules. There is no clear evidence of progressive disease  · 12/13/2021 patient had a bone scan which showed uptake along the posterior right ribs consistent with rib fractures.  There is mild uptake in the L4 vertebral body corresponding to the sclerotic lesion seen on previous CT scan.  This was likely due to metastatic disease  · 10/6/2022: Patient has been lost to follow-up.  She stopped Ibrance due to pulmonary issues.  Apparently patient went to the emergency room and was told that Ibrance was causing lung damage.  · October 6, 2022: She has a increasing CA 15-3 and CA 27.29 as well as CEA level.  · 10/19/2022: Patient had guardant 360 testing done which was negative  · 10/31/2022: Patient had bone scan which basically showed evidence for mild progression of multifocal osseous metastatic disease.  There appears to be new activity corresponding to the thoracic cervical spine, left scapula, left sacrum and left proximal femur.  CT scan of the chest otherwise is stable.  There is a nonobstructing stone on the left kidney.          Past Medical History:   Diagnosis Date   •  Allergic    • Bone cancer     METASTATIC BONE   • Bradycardia     SECONDARY TO ABLATION   • Breast cancer 2017    mets to lymph nodes; did not do radiation   • Cancer of unknown origin    • Compression fx, thoracic spine, open, initial encounter    • Coronary artery disease    • COVID-19 2021   • Diabetes mellitus    • Heart disease, unspecified    • Hepatitis C     RESOLVED WITH MEDICATION   • Hyperlipidemia    • Hypertension    • Obesity (BMI 30-39.9) 2021   • Rib fracture     Per patient   • Sleep apnea     no machine   • Tachycardia     ATRIAL   • Type 2 diabetes mellitus 2017       Past Surgical History:   Procedure Laterality Date   • BACK SURGERY      neck X 2   • BREAST RECONSTRUCTION Bilateral    • CARDIAC ABLATION       atrial tachycardia x 5 ablations    • CARDIAC CATHETERIZATION     • CARDIAC SURGERY      stent placed in aorta   • CARDIAC SURGERY      6 surgeries as baby    • CERVICAL FUSION ANTERIOR WITH ARTIFICIAL DISCECTOMY IMPLANTATION N/A 2020    Procedure: C4 VERTEBRECTOMY AND ANTERIOR CERIVCAL DISCECTOMY WITH FUSION OF CERVICAL THREE THROUGH FIVE WITH REMOVAL OF HARDWARE C5-C6;  Surgeon: Mark Kaba MD;  Location: [facility] MAIN OR;  Service: Neurosurgery;  Laterality: N/A;   •  SECTION      x2   • COLONOSCOPY N/A 10/23/2020    Procedure: COLONOSCOPY WITH POLYPECTOMY X6;  Surgeon: Stanley Bourne MD;  Location: [facility] ENDOSCOPY;  Service: Gastroenterology;  Laterality: N/A;  POLYPS, INTERNAL HEMORRHOIDS   • ENDOMETRIAL ABLATION      REMOVAL SCAR TISSUE UTERINE   • ENDOSCOPY N/A 10/23/2020    Procedure: ESOPHAGOGASTRODUODENOSCOPY WITH BIOPSY X 1 AREA;  Surgeon: Stanley Bourne MD;  Location: [facility] ENDOSCOPY;  Service: Gastroenterology;  Laterality: N/A;  GASTRITIS, ESOPHAGITIS, HIATAL HERNIA   • KNEE SURGERY     • MASTECTOMY Bilateral    • NECK SURGERY     • PACEMAKER IMPLANTATION     • PAIN PUMP INSERTION/REVISION N/A 2022     "Procedure: PAIN PUMP INSERTION AND INTRATHECAL CATHETER PLACEMENT;  Surgeon: Chuck Hunter MD;  Location: [facility] MAIN OR;  Service: Pain Management;  Laterality: N/A;         Current Outpatient Medications:   •  acetaminophen (TYLENOL) 650 MG 8 hr tablet, Take 1 tablet by mouth As Needed for Mild Pain. TAKES BETWEEN PAIN MEDS, Disp: , Rfl:   •  aspirin 81 MG chewable tablet, Chew 1 tablet Daily., Disp: 30 tablet, Rfl: 1  •  Blood Glucose Monitoring Suppl (Accu-Chek Araceli) device, Use as instructed   To test blood sugar bid, Disp: 1 each, Rfl: 0  •  Calcium Carbonate-Vit D-Min (Calcium 600+D Plus Minerals) 600-400 MG-UNIT chewable tablet, Chew 600 mg 2 (Two) Times a Day., Disp: 60 each, Rfl: 6  •  cephalexin (KEFLEX) 500 MG capsule, Take 1 capsule by mouth Every 12 (Twelve) Hours., Disp: , Rfl:   •  glucose blood (Accu-Chek Araceli Plus) test strip, Use as instructed   To test bid, Disp: 200 each, Rfl: 3  •  ibuprofen (ADVIL,MOTRIN) 400 MG tablet, Take 1 tablet by mouth As Needed for Mild Pain. IN BETWEEN PAIN  MEDS, Disp: , Rfl:   •  insulin NPH-insulin regular (humuLIN 70/30,novoLIN 70/30) (70-30) 100 UNIT/ML injection, Inject 40 Units under the skin into the appropriate area as directed Every Morning., Disp: , Rfl:   •  insulin NPH-insulin regular (humuLIN 70/30,novoLIN 70/30) (70-30) 100 UNIT/ML injection, Inject 30 Units under the skin into the appropriate area as directed Every Evening., Disp: , Rfl:   •  Insulin Pen Needle (B-D UF III MINI PEN NEEDLES) 31G X 5 MM misc, Use to inject insulin twice daily   DX: E11.9, Disp: 100 each, Rfl: 0  •  Insulin Syringe-Needle U-100 28G X 1/2\" 1 ML misc, 1 each 2 (Two) Times a Day., Disp: 100 each, Rfl: 6  •  Lancets (accu-chek soft touch) lancets, Test bid, Disp: 200 each, Rfl: 3  •  lisinopril (PRINIVIL,ZESTRIL) 20 MG tablet, Take 1 tablet by mouth Daily., Disp: , Rfl:   •  ondansetron ODT (ZOFRAN-ODT) 4 MG disintegrating tablet, DISSOLVE 1 TABLET IN MOUTH EVERY 8 " HOURS AS NEEDED FOR NAUSEA FOR UP TO 2 DAYS, Disp: , Rfl:   •  rosuvastatin (Crestor) 20 MG tablet, Take 1 tablet by mouth Every Night., Disp: 90 tablet, Rfl: 0  No current facility-administered medications for this visit.    Allergies   Allergen Reactions   • Promethazine Other (See Comments)     Hyperactive mean   • Tape Other (See Comments)     .blisters         Family History   Problem Relation Age of Onset   • Heart disease Mother    • Stroke Mother    • Lung cancer Mother    • Aneurysm Father    • Diabetes Sister    • No Known Problems Brother    • No Known Problems Brother    • Diabetes type I Half-Sister    • Thyroid cancer Half-Sister    • Cancer Maternal Aunt    • Heart attack Sister    • Thyroid disease Sister    • No Known Problems Sister        Cancer-related family history includes Cancer in her maternal aunt; Lung cancer in her mother; Thyroid cancer in her half-sister.    Social History     Tobacco Use   • Smoking status: Never     Passive exposure: Never   • Smokeless tobacco: Never   Vaping Use   • Vaping Use: Never used   Substance Use Topics   • Alcohol use: Yes     Alcohol/week: 1.0 standard drink     Types: 1 Glasses of wine per week     Comment: mixed drink once a week   • Drug use: Not Currently     I have reviewed and confirmed the accuracy of the patient's history: Chief complaint, HPI, ROS and Subjective as entered by the MA/LPN/RN. Мария Nunez MD 04/07/23       SUBJECTIVE:    She is here for follow-up visit after a prolonged absence as she was last seen by me March 2022    Patient was scheduled to begin Faslodex but she has not had injection since last visit.  She comes in complaining of generalized body aches and pains.  She has not discontinued Arimidex    She has been noncompliant with treatment recommendations.  She has not had any doses of Faslodex despite being recommended 3 months ago.          REVIEW OF SYSTEMS:     Review of Systems   Constitutional: Negative for  "chills and fever.   HENT: Negative for ear pain, mouth sores, nosebleeds and sore throat.    Eyes: Negative for photophobia and visual disturbance.   Respiratory: Negative for wheezing and stridor.    Cardiovascular: Negative for chest pain and palpitations.   Gastrointestinal: Negative for abdominal pain, diarrhea, nausea and vomiting.   Endocrine: Negative for cold intolerance and heat intolerance.   Genitourinary: Negative for dysuria and hematuria.   Musculoskeletal: Negative for joint swelling and neck stiffness.   Skin: Negative for color change and rash.   Neurological: Negative for seizures and syncope.   Hematological: Negative for adenopathy.        No obvious bleeding   Psychiatric/Behavioral: Negative for agitation, confusion and hallucinations.     OBJECTIVE:    Vitals:    04/07/23 1030   BP: 153/75   Pulse: 88   Resp: 16   Temp: 97.1 °F (36.2 °C)   TempSrc: Infrared   Weight: 69.4 kg (153 lb)   Height: 154.9 cm (61\")   PainSc: 0-No pain     Body mass index is 28.91 kg/m².    ECOG    (1) Restricted in physically strenuous activity, ambulatory and able to do work of light nature    Physical Exam  Vitals and nursing note reviewed.   Constitutional:       General: She is not in acute distress.     Appearance: Normal appearance. She is not diaphoretic.   HENT:      Head: Normocephalic and atraumatic.      Mouth/Throat:      Mouth: Mucous membranes are moist.      Pharynx: Oropharynx is clear.   Eyes:      General: No scleral icterus.        Right eye: No discharge.         Left eye: No discharge.      Extraocular Movements: Extraocular movements intact.      Conjunctiva/sclera: Conjunctivae normal.   Neck:      Thyroid: No thyromegaly.   Cardiovascular:      Rate and Rhythm: Normal rate and regular rhythm.      Pulses: Normal pulses.      Heart sounds: Normal heart sounds.     No friction rub. No gallop.   Pulmonary:      Effort: Pulmonary effort is normal. No respiratory distress.      Breath sounds: " Normal breath sounds. No stridor. No wheezing.   Abdominal:      General: Bowel sounds are normal. There is distension.      Palpations: Abdomen is soft. There is no mass.      Tenderness: There is abdominal tenderness. There is guarding (Left upper abdomen). There is no rebound.   Musculoskeletal:         General: No tenderness. Normal range of motion.      Cervical back: Normal range of motion and neck supple.      Right lower leg: No edema.      Left lower leg: No edema.      Comments: Neck pain.  Patient has a neck collar.   Lymphadenopathy:      Cervical: No cervical adenopathy.   Skin:     General: Skin is warm and dry.      Capillary Refill: Capillary refill takes less than 2 seconds.      Findings: No bruising, erythema or rash.   Neurological:      Mental Status: She is alert and oriented to person, place, and time.      Cranial Nerves: No cranial nerve deficit.      Sensory: No sensory deficit.      Motor: No abnormal muscle tone.   Psychiatric:         Mood and Affect: Mood normal.         Behavior: Behavior normal.         Thought Content: Thought content normal.         Judgment: Judgment normal.     I have reexamined the patient and the results are consistent with the previously documented exam. Мария Lian Nunez MD     RECENT LABS    WBC   Date Value Ref Range Status   04/07/2023 5.60 3.40 - 10.80 10*3/mm3 Final     RBC   Date Value Ref Range Status   04/07/2023 4.74 3.77 - 5.28 10*6/mm3 Final     Hemoglobin   Date Value Ref Range Status   04/07/2023 13.3 12.0 - 15.9 g/dL Final     Hematocrit   Date Value Ref Range Status   04/07/2023 42.1 34.0 - 46.6 % Final     MCV   Date Value Ref Range Status   04/07/2023 88.8 79.0 - 97.0 fL Final     MCH   Date Value Ref Range Status   04/07/2023 28.1 26.6 - 33.0 pg Final     MCHC   Date Value Ref Range Status   04/07/2023 31.6 31.5 - 35.7 g/dL Final     RDW   Date Value Ref Range Status   04/07/2023 13.0 12.3 - 15.4 % Final     RDW-SD   Date Value Ref Range  Status   04/07/2023 41.6 37.0 - 54.0 fl Final     MPV   Date Value Ref Range Status   04/07/2023 9.9 6.0 - 12.0 fL Final     Platelets   Date Value Ref Range Status   04/07/2023 111 (L) 140 - 450 10*3/mm3 Final     Neutrophil %   Date Value Ref Range Status   04/07/2023 60.0 42.7 - 76.0 % Final     Lymphocyte %   Date Value Ref Range Status   04/07/2023 30.5 19.6 - 45.3 % Final     Monocyte %   Date Value Ref Range Status   04/07/2023 7.9 5.0 - 12.0 % Final     Eosinophil %   Date Value Ref Range Status   04/07/2023 1.4 0.3 - 6.2 % Final     Basophil %   Date Value Ref Range Status   04/07/2023 0.2 0.0 - 1.5 % Final     Immature Grans %   Date Value Ref Range Status   07/20/2020 0.7 (H) 0.0 - 0.5 % Final     Neutrophils, Absolute   Date Value Ref Range Status   04/07/2023 3.36 1.70 - 7.00 10*3/mm3 Final     Lymphocytes, Absolute   Date Value Ref Range Status   04/07/2023 1.71 0.70 - 3.10 10*3/mm3 Final     Monocytes, Absolute   Date Value Ref Range Status   04/07/2023 0.44 0.10 - 0.90 10*3/mm3 Final     Eosinophils, Absolute   Date Value Ref Range Status   04/07/2023 0.08 0.00 - 0.40 10*3/mm3 Final     Basophils, Absolute   Date Value Ref Range Status   04/07/2023 0.01 0.00 - 0.20 10*3/mm3 Final     Immature Grans, Absolute   Date Value Ref Range Status   07/20/2020 0.05 0.00 - 0.05 10*3/mm3 Final     nRBC   Date Value Ref Range Status   03/29/2023 0.1 0.0 - 0.2 /100 WBC Final       Lab Results   Component Value Date    GLUCOSE 89 03/29/2023    BUN 12 03/29/2023    CREATININE 0.63 03/29/2023    EGFRIFNONA 70 12/20/2021    EGFRIFAFRI >60 10/12/2018    BCR 19.0 03/29/2023    K 3.9 03/29/2023    CO2 24.0 03/29/2023    CALCIUM 9.8 03/29/2023    PROTENTOTREF 7.4 12/14/2020    ALBUMIN 4.3 03/29/2023    LABIL2 0.9 12/14/2020    AST 39 (H) 03/29/2023    ALT 50 (H) 03/29/2023       ASSESSMENT:    · Metastatic breast cancer presenting as 1.1 cm mixed lytic/sclerotic hypermetabolic lesion in the left hemisacrum suspicious for  metastatic disease 1.2 cm sclerotic lesion on L4, small L3 lesion and T11 lesion.  Tumor is ER positive, NH positive and HER-2/bishop negative.  Patient was prescribed combination of Ibrance and Arimidex.  She discontinued Ibrance a few months ago due to pulmonary issues.  Patient has not been to the office since March 2022.  She is currently on single agent Arimidex but due to evidence of collateral progression, will discontinue Arimidex and switched to Faslodex.  We will see if this works if it does not work then would add a different CDK 4 inhibitor.  Reviewed with patient.  Discussed the benefits and side effects of Faslodex with her.  She was encouraged to start Faslodex as recommended.  She will be reassessed again in 6 weeks.  Patient has been on compliant with her treatment for her malignancy.  We have addressed this issue today and she understands that progressive disease is expected if no treatment is received  · Bone metastasis: Zometa has been recommended  · Medical noncompliance  · Pancytopenia secondary to Ibrance: Improved off Ibrance  · Ongoing back pain issues: Reviewed her bone scan . MRI of the lumbar and pelvis area. We will also give patient referral to pain management with Dr. Hunter.  Continue Percocet to 10 mg every 4-6 hours as needed for pain.  Patient encouraged to follow-up with pain management  · ypT2 N1 aM0 status post left modified radical mastectomy with lymph node dissection and right prophylactic mastectomy in 2017 performed at [facility].  ER positive, NH positive and HER-2/bishop negative.  Status post bilateral chest wall reconstruction.  According to patient, she tolerated Arimidex very well except that she also had osteoporosis therefore Arimidex was discontinued at that time  · Intolerance of tamoxifen in the past  · Osteoporosis: She was on Prolia: We will transition to Xgeva since she has bone metastases  · Status post neoadjuvant Arimidex prior to bilateral  mastectomies  · Thrombocytopenia: Work-up was - December 2020  · Neck pain status post cervical spine surgery: Resolved  · Complex cardiac medical history including tetralogy of Fallot, coarctation of aorta, VSD status post repair.  Status post stent placement for coarctation of aorta  · Personal history and strong family history of breast cancer in multiple in the relatives on paternal side of the family including 4 paternal aunts in their 30s and 40s and 2 maternal aunts at age of 20s and 50s.  There is concern for possible hereditary breast cancer syndrome.  Patient may be  interested in pursuing cancer genetics for her management.  · Assessment has been reviewed and updated          Discussion    Patient has metastatic breast cancer estrogen and progesterone dependent and HER-2/bishop negative.  She is currently on Arimidex.  We will add Ibrance.  We will also discontinue Prolia and begin Xgeva to help reduce skeletal events.           Plans:     · Obtain new baseline CT scan chest abdomen and pelvis as well as bone scan studies due now  · We will check tumor markers for CEA CA 15-3 and CA 27.29   · Restart Faslodex and continue Zometa once her dental issues have been addressed.  Patient has been encouraged to have this accomplished  · Guardant 360 for NGS testing was negative for any actionable mutations  · Discontinue Arimidex and begin Faslodex early next week.  Reviewed with patient  · Follow-up with pain management with Dr. Hunter  · Guardian 360 does not show any actionable mutation.  Would consider soft tissue biopsy at time of progression for additional NGS testing.  Reviewed with patient  · Advised patient to remain compliant with follow-up  · Referred to pulmonary for her dyspnea: Dr. Julio.  Appointment is pending  · Follow-up with pain management for ongoing pain issues  · Follow-up with /counselor   · Reviewed work-up for thrombocytopenia which was negative  · Reviewed her bone density  which showed osteoporosis  · Schedule Xgeva 120 mg subcu monthly once her dental procedures are completed  · All questions answered  · Follow-up in 6 weeks or earlier as needed  · All questions answered            Patient verbalized understanding and is in agreement of the above plan.                I spent 40 total minutes, face-to-face, caring for lGadys today.  90% of this time involved counseling and/or coordination of care as documented within this note.

## 2023-04-04 NOTE — TELEPHONE ENCOUNTER
Attempted post procedure phone call but no answer.  Message left on machine to call us with any problems or concerns.

## 2023-04-07 ENCOUNTER — DOCUMENTATION (OUTPATIENT)
Dept: ONCOLOGY | Facility: CLINIC | Age: 63
End: 2023-04-07
Payer: MEDICARE

## 2023-04-07 ENCOUNTER — HOSPITAL ENCOUNTER (OUTPATIENT)
Dept: ONCOLOGY | Facility: HOSPITAL | Age: 63
Discharge: HOME | End: 2023-04-07
Admitting: INTERNAL MEDICINE
Payer: MEDICARE

## 2023-04-07 ENCOUNTER — OFFICE VISIT (OUTPATIENT)
Dept: ONCOLOGY | Facility: CLINIC | Age: 63
End: 2023-04-07
Payer: MEDICARE

## 2023-04-07 VITALS
BODY MASS INDEX: 28.89 KG/M2 | TEMPERATURE: 97.1 F | WEIGHT: 153 LBS | HEIGHT: 61 IN | HEART RATE: 88 BPM | OXYGEN SATURATION: 96 % | SYSTOLIC BLOOD PRESSURE: 153 MMHG | RESPIRATION RATE: 16 BRPM | DIASTOLIC BLOOD PRESSURE: 75 MMHG

## 2023-04-07 VITALS
TEMPERATURE: 97.1 F | RESPIRATION RATE: 16 BRPM | DIASTOLIC BLOOD PRESSURE: 75 MMHG | HEIGHT: 61 IN | BODY MASS INDEX: 28.89 KG/M2 | SYSTOLIC BLOOD PRESSURE: 153 MMHG | WEIGHT: 153 LBS | HEART RATE: 88 BPM

## 2023-04-07 DIAGNOSIS — C50.919 MALIGNANT NEOPLASM OF FEMALE BREAST, UNSPECIFIED ESTROGEN RECEPTOR STATUS, UNSPECIFIED LATERALITY, UNSPECIFIED SITE OF BREAST: Primary | ICD-10-CM

## 2023-04-07 DIAGNOSIS — C79.51 MALIGNANT NEOPLASM METASTATIC TO BONE: ICD-10-CM

## 2023-04-07 DIAGNOSIS — C50.919 MALIGNANT NEOPLASM OF FEMALE BREAST, UNSPECIFIED ESTROGEN RECEPTOR STATUS, UNSPECIFIED LATERALITY, UNSPECIFIED SITE OF BREAST: ICD-10-CM

## 2023-04-07 DIAGNOSIS — M81.0 OSTEOPOROSIS, UNSPECIFIED OSTEOPOROSIS TYPE, UNSPECIFIED PATHOLOGICAL FRACTURE PRESENCE: Primary | ICD-10-CM

## 2023-04-07 LAB
ALBUMIN SERPL-MCNC: 4.4 G/DL (ref 3.5–5.2)
ALBUMIN/GLOB SERPL: 1.4 G/DL
ALP SERPL-CCNC: 88 U/L (ref 39–117)
ALT SERPL W P-5'-P-CCNC: 24 U/L (ref 1–33)
ANION GAP SERPL CALCULATED.3IONS-SCNC: 10 MMOL/L (ref 5–15)
AST SERPL-CCNC: 27 U/L (ref 1–32)
BASOPHILS # BLD AUTO: 0.01 10*3/MM3 (ref 0–0.2)
BASOPHILS NFR BLD AUTO: 0.2 % (ref 0–1.5)
BILIRUB SERPL-MCNC: 0.4 MG/DL (ref 0–1.2)
BUN SERPL-MCNC: 16 MG/DL (ref 8–23)
BUN/CREAT SERPL: 25.4 (ref 7–25)
CALCIUM SPEC-SCNC: 9.7 MG/DL (ref 8.6–10.5)
CANCER AG15-3 SERPL-ACNC: 98.2 U/ML
CHLORIDE SERPL-SCNC: 104 MMOL/L (ref 98–107)
CO2 SERPL-SCNC: 25 MMOL/L (ref 22–29)
CREAT SERPL-MCNC: 0.63 MG/DL (ref 0.57–1)
DEPRECATED RDW RBC AUTO: 41.6 FL (ref 37–54)
EGFRCR SERPLBLD CKD-EPI 2021: 101.7 ML/MIN/1.73
EOSINOPHIL # BLD AUTO: 0.08 10*3/MM3 (ref 0–0.4)
EOSINOPHIL NFR BLD AUTO: 1.4 % (ref 0.3–6.2)
ERYTHROCYTE [DISTWIDTH] IN BLOOD BY AUTOMATED COUNT: 13 % (ref 12.3–15.4)
GLOBULIN UR ELPH-MCNC: 3.1 GM/DL
GLUCOSE SERPL-MCNC: 106 MG/DL (ref 65–99)
HCT VFR BLD AUTO: 42.1 % (ref 34–46.6)
HGB BLD-MCNC: 13.3 G/DL (ref 12–15.9)
LYMPHOCYTES # BLD AUTO: 1.71 10*3/MM3 (ref 0.7–3.1)
LYMPHOCYTES NFR BLD AUTO: 30.5 % (ref 19.6–45.3)
MCH RBC QN AUTO: 28.1 PG (ref 26.6–33)
MCHC RBC AUTO-ENTMCNC: 31.6 G/DL (ref 31.5–35.7)
MCV RBC AUTO: 88.8 FL (ref 79–97)
MONOCYTES # BLD AUTO: 0.44 10*3/MM3 (ref 0.1–0.9)
MONOCYTES NFR BLD AUTO: 7.9 % (ref 5–12)
NEUTROPHILS NFR BLD AUTO: 3.36 10*3/MM3 (ref 1.7–7)
NEUTROPHILS NFR BLD AUTO: 60 % (ref 42.7–76)
PLATELET # BLD AUTO: 111 10*3/MM3 (ref 140–450)
PMV BLD AUTO: 9.9 FL (ref 6–12)
POTASSIUM SERPL-SCNC: 4.2 MMOL/L (ref 3.5–5.2)
PROT SERPL-MCNC: 7.5 G/DL (ref 6–8.5)
RBC # BLD AUTO: 4.74 10*6/MM3 (ref 3.77–5.28)
SODIUM SERPL-SCNC: 139 MMOL/L (ref 136–145)
WBC NRBC COR # BLD: 5.6 10*3/MM3 (ref 3.4–10.8)

## 2023-04-07 PROCEDURE — 80053 COMPREHEN METABOLIC PANEL: CPT | Performed by: INTERNAL MEDICINE

## 2023-04-07 PROCEDURE — 3078F DIAST BP <80 MM HG: CPT | Performed by: INTERNAL MEDICINE

## 2023-04-07 PROCEDURE — 36415 COLL VENOUS BLD VENIPUNCTURE: CPT

## 2023-04-07 PROCEDURE — 99215 OFFICE O/P EST HI 40 MIN: CPT | Performed by: INTERNAL MEDICINE

## 2023-04-07 PROCEDURE — 25010000002 FULVESTRANT PER 25 MG: Performed by: INTERNAL MEDICINE

## 2023-04-07 PROCEDURE — 96402 CHEMO HORMON ANTINEOPL SQ/IM: CPT

## 2023-04-07 PROCEDURE — 85025 COMPLETE CBC W/AUTO DIFF WBC: CPT | Performed by: INTERNAL MEDICINE

## 2023-04-07 PROCEDURE — 3077F SYST BP >= 140 MM HG: CPT | Performed by: INTERNAL MEDICINE

## 2023-04-07 PROCEDURE — 1126F AMNT PAIN NOTED NONE PRSNT: CPT | Performed by: INTERNAL MEDICINE

## 2023-04-07 PROCEDURE — 86300 IMMUNOASSAY TUMOR CA 15-3: CPT | Performed by: INTERNAL MEDICINE

## 2023-04-07 PROCEDURE — 36415 COLL VENOUS BLD VENIPUNCTURE: CPT | Performed by: INTERNAL MEDICINE

## 2023-04-07 RX ORDER — LAMOTRIGINE 25 MG/1
500 TABLET ORAL ONCE
Status: CANCELLED | OUTPATIENT
Start: 2023-04-07

## 2023-04-07 RX ORDER — ONDANSETRON 4 MG/1
TABLET, ORALLY DISINTEGRATING ORAL
COMMUNITY
Start: 2023-04-01

## 2023-04-07 RX ORDER — LAMOTRIGINE 25 MG/1
500 TABLET ORAL ONCE
Status: CANCELLED | OUTPATIENT
Start: 2023-04-21

## 2023-04-07 RX ORDER — LAMOTRIGINE 25 MG/1
500 TABLET ORAL ONCE
Status: COMPLETED | OUTPATIENT
Start: 2023-04-07 | End: 2023-04-07

## 2023-04-07 RX ADMIN — FULVESTRANT 500 MG: 50 INJECTION INTRAMUSCULAR at 13:00

## 2023-04-07 NOTE — PROGRESS NOTES
Venipuncture performed in right hand by Reginald Flores with good hemostasis. Patient tolerated well. (4/7/23) (AM).  Patient restarted Faslodex today but Zometa was held per Dr. Nunez. Patient tolerated IM injections well.

## 2023-04-07 NOTE — PROGRESS NOTES
OSW was notified by Infusion RN, that patient was discussing difficulties accessing dental services and possibly stopping treatment based on this difficulty.     OSW met w/ patient in infusion suite to assess the situation and see if patient qualifies for Donated Dental Services.     Patient receives SSD-I and works PT at [employer] She reports her  recently had a quadruple CABG, also. She reports she makes the house payment, pays for 'everyone's cell phone' and other financial responsibilities.     Patient became tearful discussing her teeth and that she's wanted to have her teeth fixed for as long as she can remember. She reports a few years ago she talked to [facility] and [facility], who discussed it being approx.  $5,000 for fixed permanent dentures for her upper teeth and keeping her bottom teeth. Patient reports trying to save money but she's never able to. Patient does have dental insurance via her Medicare Replacement.     OSW discussed Donated Dental Services, but that it's usually only for dental services needed to continue or initiate treatment, not for cosmetic procedures. Patient also mentioned she's read a lot of the side effects online of the medication she's supposed to take today and she's unsure she wants to take it. OSW encouraged patient to discuss those concerns with her MD, as anyone can post anything online, even if it's untrue or a rare side effect.     Nai Jane, LSW, CSW, MSW  Oncology MSW  [facility]

## 2023-04-08 LAB
CANCER AG27-29 SERPL-ACNC: 212.3 U/ML (ref 0–38.6)
CEA SERPL-MCNC: 23 NG/ML (ref 0–4.7)

## 2023-04-21 ENCOUNTER — HOSPITAL ENCOUNTER (OUTPATIENT)
Dept: ONCOLOGY | Facility: HOSPITAL | Age: 63
Discharge: HOME | End: 2023-04-21
Payer: MEDICARE

## 2023-04-21 VITALS
BODY MASS INDEX: 28.04 KG/M2 | DIASTOLIC BLOOD PRESSURE: 98 MMHG | SYSTOLIC BLOOD PRESSURE: 173 MMHG | HEIGHT: 61 IN | HEART RATE: 85 BPM | TEMPERATURE: 97.8 F | WEIGHT: 148.5 LBS

## 2023-04-21 DIAGNOSIS — C79.51 MALIGNANT NEOPLASM METASTATIC TO BONE: Primary | ICD-10-CM

## 2023-04-21 LAB
BASOPHILS # BLD AUTO: 0.01 10*3/MM3 (ref 0–0.2)
BASOPHILS NFR BLD AUTO: 0.2 % (ref 0–1.5)
DEPRECATED RDW RBC AUTO: 40.8 FL (ref 37–54)
EOSINOPHIL # BLD AUTO: 0.07 10*3/MM3 (ref 0–0.4)
EOSINOPHIL NFR BLD AUTO: 1.3 % (ref 0.3–6.2)
ERYTHROCYTE [DISTWIDTH] IN BLOOD BY AUTOMATED COUNT: 13.3 % (ref 12.3–15.4)
HCT VFR BLD AUTO: 42.2 % (ref 34–46.6)
HGB BLD-MCNC: 13.8 G/DL (ref 12–15.9)
LYMPHOCYTES # BLD AUTO: 1.41 10*3/MM3 (ref 0.7–3.1)
LYMPHOCYTES NFR BLD AUTO: 26.6 % (ref 19.6–45.3)
MCH RBC QN AUTO: 28.6 PG (ref 26.6–33)
MCHC RBC AUTO-ENTMCNC: 32.7 G/DL (ref 31.5–35.7)
MCV RBC AUTO: 87.4 FL (ref 79–97)
MONOCYTES # BLD AUTO: 0.34 10*3/MM3 (ref 0.1–0.9)
MONOCYTES NFR BLD AUTO: 6.4 % (ref 5–12)
NEUTROPHILS NFR BLD AUTO: 3.47 10*3/MM3 (ref 1.7–7)
NEUTROPHILS NFR BLD AUTO: 65.5 % (ref 42.7–76)
PLATELET # BLD AUTO: 114 10*3/MM3 (ref 140–450)
PMV BLD AUTO: 10 FL (ref 6–12)
RBC # BLD AUTO: 4.83 10*6/MM3 (ref 3.77–5.28)
WBC NRBC COR # BLD: 5.3 10*3/MM3 (ref 3.4–10.8)

## 2023-04-21 PROCEDURE — 36415 COLL VENOUS BLD VENIPUNCTURE: CPT

## 2023-04-21 PROCEDURE — 96402 CHEMO HORMON ANTINEOPL SQ/IM: CPT

## 2023-04-21 PROCEDURE — 85025 COMPLETE CBC W/AUTO DIFF WBC: CPT | Performed by: INTERNAL MEDICINE

## 2023-04-21 PROCEDURE — 25010000002 FULVESTRANT PER 25 MG: Performed by: INTERNAL MEDICINE

## 2023-04-21 RX ORDER — LAMOTRIGINE 25 MG/1
500 TABLET ORAL ONCE
Status: COMPLETED | OUTPATIENT
Start: 2023-04-21 | End: 2023-04-21

## 2023-04-21 RX ADMIN — FULVESTRANT 500 MG: 50 INJECTION INTRAMUSCULAR at 10:25

## 2023-04-21 NOTE — PROGRESS NOTES
Venipuncture performed in right hand by Reginald with good hemostasis. Patient tolerated well. (4/21/23) (AM).

## 2023-04-24 ENCOUNTER — HOSPITAL ENCOUNTER (OUTPATIENT)
Dept: NUCLEAR MEDICINE | Facility: HOSPITAL | Age: 63
Discharge: HOME | End: 2023-04-24
Payer: MEDICARE

## 2023-04-24 DIAGNOSIS — C50.919 MALIGNANT NEOPLASM OF FEMALE BREAST, UNSPECIFIED ESTROGEN RECEPTOR STATUS, UNSPECIFIED LATERALITY, UNSPECIFIED SITE OF BREAST: ICD-10-CM

## 2023-04-24 PROCEDURE — 78306 BONE IMAGING WHOLE BODY: CPT

## 2023-04-24 PROCEDURE — 0 TECHNETIUM MEDRONATE KIT: Performed by: INTERNAL MEDICINE

## 2023-04-24 PROCEDURE — A9503 TC99M MEDRONATE: HCPCS | Performed by: INTERNAL MEDICINE

## 2023-04-24 RX ORDER — TC 99M MEDRONATE 20 MG/10ML
26.1 INJECTION, POWDER, LYOPHILIZED, FOR SOLUTION INTRAVENOUS
Status: COMPLETED | OUTPATIENT
Start: 2023-04-24 | End: 2023-04-24

## 2023-04-24 RX ADMIN — TC 99M MEDRONATE 26.1 MILLICURIE: 20 INJECTION, POWDER, LYOPHILIZED, FOR SOLUTION INTRAVENOUS at 09:09

## 2023-05-01 ENCOUNTER — OFFICE VISIT (OUTPATIENT)
Dept: CARDIOLOGY | Facility: CLINIC | Age: 63
End: 2023-05-01
Payer: MEDICARE

## 2023-05-01 VITALS
BODY MASS INDEX: 29.25 KG/M2 | DIASTOLIC BLOOD PRESSURE: 72 MMHG | HEIGHT: 60 IN | HEART RATE: 72 BPM | RESPIRATION RATE: 18 BRPM | SYSTOLIC BLOOD PRESSURE: 149 MMHG | WEIGHT: 149 LBS

## 2023-05-01 DIAGNOSIS — E78.5 DYSLIPIDEMIA: ICD-10-CM

## 2023-05-01 DIAGNOSIS — Z95.0 PACEMAKER: Primary | ICD-10-CM

## 2023-05-01 DIAGNOSIS — E78.5 HYPERLIPIDEMIA, UNSPECIFIED HYPERLIPIDEMIA TYPE: Chronic | ICD-10-CM

## 2023-05-01 NOTE — PROGRESS NOTES
Cardiology Clinic Note  Stanley Lopez MD, PhD    Subjective:     Encounter Date:05/01/2023      Patient ID: Gladys Garrison is a 62 y.o. female.    Chief Complaint:  No chief complaint on file.      HPI:    I had the pleasure to see this 62-year-old female as a new patient today with history of hypertension, hyperlipidemia, pulm hypertension, history of congenital heart disease with tetralogy of Fallot with surgically repaired VSD, type 2 diabetes, valvular heart disease in conjunction with tetralogy with both pulmonic and tricuspid valve abnormalities.  Last 2D echo March 2023 revealed normal EF 55 to 60% with grade 1 diastolic dysfunction, severely reduced RV systolic function with moderately dilated RV and RA, moderate bileaflet mitral valve thickening with severe TR, mildly elevated pulmonary pressures 35-45, trivial pericardial effusion.  She also has metastatic breast cancer diagnosed 2017 with bone mets diagnosed in 2021, history of bilateral mastectomy.  Under therapy evaluation also pain management from oncology standpoint.  She has a history of pacemaker with AV dual paced rhythm initial generator 2007 generator change 2015 with [employer] device.  She also history of COVID 2021 she was admitted recently in February with chest pain atypical nature thought to be secondary to metastatic disease.  She was ultimately discharged without further work-up.  She has no history of obstructive CAD or coronary intervention.  She is questioning MRI evaluation     Review of systems otherwise negative x14 point review of systems except as mentioned above      Historical data copied forward from previous encounters in EMR including the history, exam, and assessment/plan has been reviewed and is unchanged unless noted otherwise.    Cardiac medicines reviewed with risk, benefits, and necessity of each discussed.    Risk and benefit of cardiac testing reviewed including death heart attack stroke pain bleeding infection need  for vascular /cardiovascular surgery were discussed and the patient     Objective:         Vitals reviewed in EMR    Physical Exam  Regular rate and rhythm with no rubs gallops, RV heave no lift noted  2 out of 6 systolic ejection murmur left sternal border  No carotid bruits, positive V wave  No clubbing cyanosis or edema  Normal pulses normal cap refill  Skin, dry  She is  Assessment:         Essential hypertension  Hyperlipidemia  Type 2 diabetes  Metastatic breast cancer with bone mets  History of bilateral mastectomy  History of pacemaker 2007 with generator change 2015, AV device [employer]onic dual-chamber  History of Tetralogy of Fallot status postsurgical repair as a child of VSD  RV systolic dysfunction likely secondary to severe TR is a late complication of tetralogy as well as RV lead across the valve, no echo evidence of residual VSD  RV systolic dysfunction has been known for quite some time even examining echo was back in 2021      Continue aspirin  Diabetes medications goal A1c less than 7 with insulin  Lisinopril 20 daily  Crestor 20 daily  Normal beta-blockers with AV paced rhythm RV systolic dysfunction  Likely not much to be done for RV dysfunction in the setting, not a candidate for tricuspid valve repair or replacement  Status post VSD repair, likely complete heart block requiring AV paced rhythm  Follow-up with oncology, unclear overall prognosis  Continue pain management with stress fractures, metastatic bone pain    MRI evaluation  Her device is MRI compatible as are the leads  She has had multiple MRIs in the past  She has been dependent on the device for quite some time secondary to multiple surgical and ablation interventions  Recommended call [employer] rep to be present at the time of cardiac MRI with device setting optimization as well as evaluation of escape rhythm underneath the device settings  She is cleared for cardiac MRI from a cardiac standpoint with presence of [employer] device rep  during the procedure      Stanley Lopez MD, PhD  Evaluate 6 months  Interrogate the device at that time        The pleasure to be involved in this patient's cardiovascular care.  Please call with any questions or concerns  Stanley Lopez MD, PhD    Most recent EKG as reviewed and interpreted by me:  Procedures     Most recent echo as reviewed and interpreted by me:  Results for orders placed during the hospital encounter of 03/07/23    Adult Transthoracic Echo Complete W/ Cont if Necessary Per Protocol    Interpretation Summary  •  Left ventricular systolic function is normal. Left ventricular ejection fraction appears to be 56 - 60%.  •  Left ventricular diastolic function is consistent with (grade Ia w/high LAP) impaired relaxation.  •  Severely reduced right ventricular systolic function noted.  •  The right ventricular cavity is moderately dilated.  •  The right atrial cavity is moderate to severely  dilated.  •  There is moderate, bileaflet mitral valve thickening present.  •  Severe tricuspid valve regurgitation is present.  •  Estimated right ventricular systolic pressure from tricuspid regurgitation is mildly elevated (35-45 mmHg).  •  Mild pulmonary hypertension is present.  •  There is a trivial pericardial effusion. There is no evidence of cardiac tamponade.      Most recent stress test as reviewed and interpreted by me:  Results for orders placed during the hospital encounter of 12/22/19    Stress Test With Myocardial Perfusion One Day    Interpretation Summary  · Findings consistent with an indeterminate ECG stress test.  · Left ventricular ejection fraction is normal (Calculated EF = 56%).  · Impressions are consistent with a low risk study.  · Nuclear images reviewed revealing prominent apical slit. Negative for ischemia.    Patient has paced rhythm, no changes during lexiscan injection    Stress portion of this exam was supervised by: Amy Cohen NP      Most recent cardiac catheterization as  "reviewed interpreted by me:  No results found for this or any previous visit.    The following portions of the patient's history were reviewed and updated as appropriate: allergies, current medications, past family history, past medical history, past social history, past surgical history and problem list.      ROS:  14 point review of systems negative except as mentioned above    Current Outpatient Medications:   •  acetaminophen (TYLENOL) 650 MG 8 hr tablet, Take 1 tablet by mouth As Needed for Mild Pain. TAKES BETWEEN PAIN MEDS, Disp: , Rfl:   •  aspirin 81 MG chewable tablet, Chew 1 tablet Daily., Disp: 30 tablet, Rfl: 1  •  Blood Glucose Monitoring Suppl (Accu-Chek Araceli) device, Use as instructed   To test blood sugar bid, Disp: 1 each, Rfl: 0  •  Calcium Carbonate-Vit D-Min (Calcium 600+D Plus Minerals) 600-400 MG-UNIT chewable tablet, Chew 600 mg 2 (Two) Times a Day., Disp: 60 each, Rfl: 6  •  cephalexin (KEFLEX) 500 MG capsule, Take 1 capsule by mouth Every 12 (Twelve) Hours., Disp: , Rfl:   •  glucose blood (Accu-Chek Araceli Plus) test strip, Use as instructed   To test bid, Disp: 200 each, Rfl: 3  •  ibuprofen (ADVIL,MOTRIN) 400 MG tablet, Take 1 tablet by mouth As Needed for Mild Pain. IN BETWEEN PAIN  MEDS, Disp: , Rfl:   •  insulin NPH-insulin regular (humuLIN 70/30,novoLIN 70/30) (70-30) 100 UNIT/ML injection, Inject 40 Units under the skin into the appropriate area as directed Every Morning., Disp: , Rfl:   •  insulin NPH-insulin regular (humuLIN 70/30,novoLIN 70/30) (70-30) 100 UNIT/ML injection, Inject 30 Units under the skin into the appropriate area as directed Every Evening., Disp: , Rfl:   •  Insulin Pen Needle (B-D UF III MINI PEN NEEDLES) 31G X 5 MM misc, Use to inject insulin twice daily   DX: E11.9, Disp: 100 each, Rfl: 0  •  Insulin Syringe-Needle U-100 28G X 1/2\" 1 ML misc, 1 each 2 (Two) Times a Day., Disp: 100 each, Rfl: 6  •  Lancets (accu-chek soft touch) lancets, Test bid, Disp: 200 " each, Rfl: 3  •  lisinopril (PRINIVIL,ZESTRIL) 20 MG tablet, Take 1 tablet by mouth Daily., Disp: , Rfl:   •  ondansetron ODT (ZOFRAN-ODT) 4 MG disintegrating tablet, DISSOLVE 1 TABLET IN MOUTH EVERY 8 HOURS AS NEEDED FOR NAUSEA FOR UP TO 2 DAYS, Disp: , Rfl:   •  rosuvastatin (Crestor) 20 MG tablet, Take 1 tablet by mouth Every Night., Disp: 90 tablet, Rfl: 0    Problem List:  Patient Active Problem List   Diagnosis   • Hyperlipidemia   • Hypertensive disorder   • Osteoarthritis of multiple joints   • Pulmonary hypertension   • Pulmonary valve disorder   • Malignant tumor of breast   • Tetralogy of Fallot   • Tricuspid valve regurgitation   • Controlled type 2 diabetes mellitus without complication, with long-term current use of insulin   • Vitamin D deficiency   • Acquired spondylolisthesis of cervical vertebra   • Adjacent segment disease with spinal stenosis   • Cervical spondylosis with myelopathy   • Cervical myelopathy with cervical radiculopathy (CMS/HCC)   • Osteoporosis   • S/P cervical spinal fusion   • Lesion of lumbar spine   • Atherosclerosis of coronary artery of native heart without angina pectoris   • Rib pain on right side   • Memory loss of unknown cause   • Malignant neoplasm of female breast   • Malignant neoplasm metastatic to bone   • Supraventricular tachycardia   • Thrombocytopenia   • Systolic congestive heart failure   • COVID-19   • Elevated AST (SGOT)   • Hyponatremia   • Pacemaker   • Sick sinus syndrome   • AV block, complete   • Cancer associated pain   • Dyspnea   • Pain in left knee   • Syncope, unspecified syncope type     Past Medical History:  Past Medical History:   Diagnosis Date   • Allergic    • Bone cancer     METASTATIC BONE   • Bradycardia     SECONDARY TO ABLATION   • Breast cancer 2017    mets to lymph nodes; did not do radiation   • Cancer of unknown origin    • Compression fx, thoracic spine, open, initial encounter    • Coronary artery disease    • COVID-19 09/01/2021    • Diabetes mellitus    • Heart disease, unspecified    • Hepatitis C     RESOLVED WITH MEDICATION   • Hyperlipidemia    • Hypertension    • Obesity (BMI 30-39.9) 2021   • Rib fracture     Per patient   • Sleep apnea     no machine   • Tachycardia     ATRIAL   • Type 2 diabetes mellitus 2017     Past Surgical History:  Past Surgical History:   Procedure Laterality Date   • BACK SURGERY      neck X 2   • BREAST RECONSTRUCTION Bilateral    • CARDIAC ABLATION       atrial tachycardia x 5 ablations    • CARDIAC CATHETERIZATION     • CARDIAC SURGERY      stent placed in aorta   • CARDIAC SURGERY      6 surgeries as baby    • CERVICAL FUSION ANTERIOR WITH ARTIFICIAL DISCECTOMY IMPLANTATION N/A 2020    Procedure: C4 VERTEBRECTOMY AND ANTERIOR CERIVCAL DISCECTOMY WITH FUSION OF CERVICAL THREE THROUGH FIVE WITH REMOVAL OF HARDWARE C5-C6;  Surgeon: Mark Kaba MD;  Location: [facility] MA[facility] OR;  Service: Neurosurgery;  Laterality: N/A;   •  SECTION      x2   • COLONOSCOPY N/A 10/23/2020    Procedure: COLONOSCOPY WITH POLYPECTOMY X6;  Surgeon: Stanley Bourne MD;  Location: [facility] ENDOSCOPY;  Service: Gastroenterology;  Laterality: N/A;  POLYPS, INTERNAL HEMORRHOIDS   • ENDOMETRIAL ABLATION      REMOVAL SCAR TISSUE UTERINE   • ENDOSCOPY N/A 10/23/2020    Procedure: ESOPHAGOGASTRODUODENOSCOPY WITH BIOPSY X 1 AREA;  Surgeon: Stanley Bourne MD;  Location: [facility] ENDOSCOPY;  Service: Gastroenterology;  Laterality: N/A;  GASTRITIS, ESOPHAGITIS, HIATAL HERNIA   • KNEE SURGERY     • MASTECTOMY Bilateral    • NECK SURGERY     • PACEMAKER IMPLANTATION     • PAIN PUMP INSERTION/REVISION N/A 2022    Procedure: PAIN PUMP INSERTION AND INTRATHECAL CATHETER PLACEMENT;  Surgeon: Chuck Hunter MD;  Location: [facility] MAIN OR;  Service: Pain Management;  Laterality: N/A;     Social History:  Social History     Socioeconomic History   • Marital status:    • Number of children:  2   Tobacco Use   • Smoking status: Never     Passive exposure: Never   • Smokeless tobacco: Never   Vaping Use   • Vaping Use: Never used   Substance and Sexual Activity   • Alcohol use: Yes     Alcohol/week: 1.0 standard drink     Types: 1 Glasses of wine per week     Comment: mixed drink once a week   • Drug use: Not Currently   • Sexual activity: Defer     Allergies:  Allergies   Allergen Reactions   • Promethazine Other (See Comments)     Hyperactive mean   • Tape Other (See Comments)     .blisters       Immunizations:  Immunization History   Administered Date(s) Administered   • Tdap 02/25/2022            In-Office Procedure(s):  No orders to display        ASCVD RIsk Score::  The ASCVD Risk score (Kyle DK, et al., 2019) failed to calculate for the following reasons:    The valid total cholesterol range is 130 to 320 mg/dL    Imaging:    Results for orders placed during the hospital encounter of 03/07/23    XR Spine Thoracic 3 View    Narrative  XR SPINE THORACIC 3 VW    Date of Exam: 3/7/2023 5:06 PM EST    Indication: Trauma.    Comparison: 2/4/2021    Findings:  As below.    Impression  Impression:  *Unchanged mild dextroscoliosis of the thoracic spine.  *Multilevel spondylosis.  *Evaluation for compression fractures is limited due to technique and overlying soft tissues. Recommend CT for further evaluation and to exclude thoracic spine fractures.  *Generalized osteopenia.    Electronically Signed: Placido Moran  3/7/2023 5:22 PM EST  Workstation ID: [id number]       Results for orders placed during the hospital encounter of 03/29/23    CT Cervical Spine Without Contrast    Narrative  CT CERVICAL SPINE WO CONTRAST    Date of Exam: 3/29/2023 3:58 PM EDT    Indication: pain.  Syncope, neck pain    Comparison: CT cervical spine without contrast 3/7/2023    Technique: Axial CT images were obtained of the cervical spine without contrast administration.  Sagittal and coronal reconstructions were performed.   Automated exposure control and iterative reconstruction methods were used.    Findings:  Postoperative findings related to prior cervical ACDF again noted at the C3-C6 level with hardware  Unchanged position. Ankylosis across the C6-7 disc space. Negative for acute fracture. Posterior spinous processes are intact. No locked or perched facet. Occipital condyles are intact. The dens is intact. There is a mild convex left levocurvature of the  upper thoracic spine centered at the T2 level.    Stent in the descending thoracic aorta partially imaged. Included portions of the mandible are intact. The mastoid air cells are well-aerated. Included lung apices clear. Visualized portions of the intracranial structures are without acute abnormality.  Visualized soft tissues of the neck are without acute abnormality. Mild carotid calcifications. Small subcentimeter right thyroid lobe nodule. Nonspecific scattered left lower cervical chain lymph nodes borderline enlarged.    Impression  Impression:  1. Negative for cervical spine fracture.  2. Intact cervical ACDF at C3-C6.    Electronically Signed: Westley Mouser  3/29/2023 4:19 PM EDT  Workstation ID: [id number]      Results for orders placed during the hospital encounter of 03/29/23    CT Cervical Spine Without Contrast    Narrative  CT CERVICAL SPINE WO CONTRAST    Date of Exam: 3/29/2023 3:58 PM EDT    Indication: pain.  Syncope, neck pain    Comparison: CT cervical spine without contrast 3/7/2023    Technique: Axial CT images were obtained of the cervical spine without contrast administration.  Sagittal and coronal reconstructions were performed.  Automated exposure control and iterative reconstruction methods were used.    Findings:  Postoperative findings related to prior cervical ACDF again noted at the C3-C6 level with hardware  Unchanged position. Ankylosis across the C6-7 disc space. Negative for acute fracture. Posterior spinous processes are intact. No locked or perched  facet. Occipital condyles are intact. The dens is intact. There is a mild convex left levocurvature of the  upper thoracic spine centered at the T2 level.    Stent in the descending thoracic aorta partially imaged. Included portions of the mandible are intact. The mastoid air cells are well-aerated. Included lung apices clear. Visualized portions of the intracranial structures are without acute abnormality.  Visualized soft tissues of the neck are without acute abnormality. Mild carotid calcifications. Small subcentimeter right thyroid lobe nodule. Nonspecific scattered left lower cervical chain lymph nodes borderline enlarged.    Impression  Impression:  1. Negative for cervical spine fracture.  2. Intact cervical ACDF at C3-C6.    Electronically Signed: Westley Mouser  3/29/2023 4:19 PM EDT  Workstation ID: [id number]      Lab Review:   Hospital Outpatient Visit on 04/21/2023   Component Date Value   • WBC 04/21/2023 5.30    • RBC 04/21/2023 4.83    • Hemoglobin 04/21/2023 13.8    • Hematocrit 04/21/2023 42.2    • MCV 04/21/2023 87.4    • MCH 04/21/2023 28.6    • MCHC 04/21/2023 32.7    • RDW 04/21/2023 13.3    • RDW-SD 04/21/2023 40.8    • MPV 04/21/2023 10.0    • Platelets 04/21/2023 114 (L)    • Neutrophil % 04/21/2023 65.5    • Lymphocyte % 04/21/2023 26.6    • Monocyte % 04/21/2023 6.4    • Eosinophil % 04/21/2023 1.3    • Basophil % 04/21/2023 0.2    • Neutrophils, Absolute 04/21/2023 3.47    • Lymphocytes, Absolute 04/21/2023 1.41    • Monocytes, Absolute 04/21/2023 0.34    • Eosinophils, Absolute 04/21/2023 0.07    • Basophils, Absolute 04/21/2023 0.01    Hospital Outpatient Visit on 04/07/2023   Component Date Value   • Glucose 04/07/2023 106 (H)    • BUN 04/07/2023 16    • Creatinine 04/07/2023 0.63    • Sodium 04/07/2023 139    • Potassium 04/07/2023 4.2    • Chloride 04/07/2023 104    • CO2 04/07/2023 25.0    • Calcium 04/07/2023 9.7    • Total Protein 04/07/2023 7.5    • Albumin 04/07/2023 4.4    • ALT  (SGPT) 04/07/2023 24    • AST (SGOT) 04/07/2023 27    • Alkaline Phosphatase 04/07/2023 88    • Total Bilirubin 04/07/2023 0.4    • Globulin 04/07/2023 3.1    • A/G Ratio 04/07/2023 1.4    • BUN/Creatinine Ratio 04/07/2023 25.4 (H)    • Anion Gap 04/07/2023 10.0    • eGFR 04/07/2023 101.7    • CA 27.29 04/07/2023 212.3 (H)    • CA 15-3 04/07/2023 98.2 (H)    • WBC 04/07/2023 5.60    • RBC 04/07/2023 4.74    • Hemoglobin 04/07/2023 13.3    • Hematocrit 04/07/2023 42.1    • MCV 04/07/2023 88.8    • MCH 04/07/2023 28.1    • MCHC 04/07/2023 31.6    • RDW 04/07/2023 13.0    • RDW-SD 04/07/2023 41.6    • MPV 04/07/2023 9.9    • Platelets 04/07/2023 111 (L)    • Neutrophil % 04/07/2023 60.0    • Lymphocyte % 04/07/2023 30.5    • Monocyte % 04/07/2023 7.9    • Eosinophil % 04/07/2023 1.4    • Basophil % 04/07/2023 0.2    • Neutrophils, Absolute 04/07/2023 3.36    • Lymphocytes, Absolute 04/07/2023 1.71    • Monocytes, Absolute 04/07/2023 0.44    • Eosinophils, Absolute 04/07/2023 0.08    • Basophils, Absolute 04/07/2023 0.01    Office Visit on 04/07/2023   Component Date Value   • CEA 04/07/2023 23.0 (H)    Admission on 03/29/2023, Discharged on 03/29/2023   Component Date Value   • QT Interval 03/29/2023 478    • Glucose 03/29/2023 89    • BUN 03/29/2023 12    • Creatinine 03/29/2023 0.63    • Sodium 03/29/2023 140    • Potassium 03/29/2023 3.9    • Chloride 03/29/2023 104    • CO2 03/29/2023 24.0    • Calcium 03/29/2023 9.8    • Total Protein 03/29/2023 7.6    • Albumin 03/29/2023 4.3    • ALT (SGPT) 03/29/2023 50 (H)    • AST (SGOT) 03/29/2023 39 (H)    • Alkaline Phosphatase 03/29/2023 84    • Total Bilirubin 03/29/2023 0.5    • Globulin 03/29/2023 3.3    • A/G Ratio 03/29/2023 1.3    • BUN/Creatinine Ratio 03/29/2023 19.0    • Anion Gap 03/29/2023 12.0    • eGFR 03/29/2023 101.7    • WBC 03/29/2023 5.70    • RBC 03/29/2023 4.35    • Hemoglobin 03/29/2023 12.5    • Hematocrit 03/29/2023 37.7    • MCV 03/29/2023 86.8     • MCH 03/29/2023 28.7    • MCHC 03/29/2023 33.1    • RDW 03/29/2023 13.2    • RDW-SD 03/29/2023 42.9    • MPV 03/29/2023 8.8    • Platelets 03/29/2023 73 (L)    • Neutrophil % 03/29/2023 67.9    • Lymphocyte % 03/29/2023 24.2    • Monocyte % 03/29/2023 6.0    • Eosinophil % 03/29/2023 1.4    • Basophil % 03/29/2023 0.5    • Neutrophils, Absolute 03/29/2023 3.80    • Lymphocytes, Absolute 03/29/2023 1.40    • Monocytes, Absolute 03/29/2023 0.30    • Eosinophils, Absolute 03/29/2023 0.10    • Basophils, Absolute 03/29/2023 0.00    • nRBC 03/29/2023 0.1    • HS Troponin T 03/29/2023 12 (H)    • proBNP 03/29/2023 843.2    • Magnesium 03/29/2023 1.9    • TSH 03/29/2023 1.260    Admission on 03/07/2023, Discharged on 03/08/2023   Component Date Value   • Glucose 03/07/2023 104 (H)    • BUN 03/07/2023 12    • Creatinine 03/07/2023 0.76    • Sodium 03/07/2023 139    • Potassium 03/07/2023 4.9    • Chloride 03/07/2023 101    • CO2 03/07/2023 25.0    • Calcium 03/07/2023 10.6 (H)    • Total Protein 03/07/2023 7.9    • Albumin 03/07/2023 4.5    • ALT (SGPT) 03/07/2023 18    • AST (SGOT) 03/07/2023 28    • Alkaline Phosphatase 03/07/2023 94    • Total Bilirubin 03/07/2023 0.6    • Globulin 03/07/2023 3.4    • A/G Ratio 03/07/2023 1.3    • BUN/Creatinine Ratio 03/07/2023 15.8    • Anion Gap 03/07/2023 13.0    • eGFR 03/07/2023 89.8    • HS Troponin T 03/07/2023 14 (H)    • Creatine Kinase 03/07/2023 98    • Magnesium 03/07/2023 2.2    • WBC 03/07/2023 6.40    • RBC 03/07/2023 4.94    • Hemoglobin 03/07/2023 13.7    • Hematocrit 03/07/2023 43.0    • MCV 03/07/2023 87.1    • MCH 03/07/2023 27.7    • MCHC 03/07/2023 31.8    • RDW 03/07/2023 14.0    • RDW-SD 03/07/2023 42.9    • MPV 03/07/2023 7.7    • Platelets 03/07/2023 142    • Neutrophil % 03/07/2023 64.9    • Lymphocyte % 03/07/2023 25.5    • Monocyte % 03/07/2023 7.9    • Eosinophil % 03/07/2023 1.3    • Basophil % 03/07/2023 0.4    • Neutrophils, Absolute 03/07/2023 4.20     • Lymphocytes, Absolute 03/07/2023 1.60    • Monocytes, Absolute 03/07/2023 0.50    • Eosinophils, Absolute 03/07/2023 0.10    • Basophils, Absolute 03/07/2023 0.00    • nRBC 03/07/2023 0.0    • HS Troponin T 03/07/2023 17 (H)    • Troponin T Delta 03/07/2023 3    • Glucose 03/08/2023 133 (H)    • BUN 03/08/2023 16    • Creatinine 03/08/2023 0.75    • Sodium 03/08/2023 140    • Potassium 03/08/2023 3.8    • Chloride 03/08/2023 105    • CO2 03/08/2023 24.0    • Calcium 03/08/2023 9.2    • BUN/Creatinine Ratio 03/08/2023 21.3    • Anion Gap 03/08/2023 11.0    • eGFR 03/08/2023 91.3    • WBC 03/08/2023 4.70    • RBC 03/08/2023 4.73    • Hemoglobin 03/08/2023 13.3    • Hematocrit 03/08/2023 40.3    • MCV 03/08/2023 85.4    • MCH 03/08/2023 28.1    • MCHC 03/08/2023 32.9    • RDW 03/08/2023 13.8    • RDW-SD 03/08/2023 43.8    • MPV 03/08/2023 7.6    • Platelets 03/08/2023 133 (L)    • HS Troponin T 03/08/2023 13 (H)    • Glucose 03/08/2023 124 (H)    • proBNP 03/08/2023 347.3    • Glucose 03/08/2023 108 (H)    • Target HR (85%) 03/08/2023 136    • Max. Pred. HR (100%) 03/08/2023 160    • LVIDd 03/08/2023 4.6    • LVIDs 03/08/2023 3.2    • IVSd 03/08/2023 1.10    • LVPWd 03/08/2023 1.10    • FS 03/08/2023 30.4    • IVS/LVPW 03/08/2023 1.00    • ESV(cubed) 03/08/2023 32.8    • LV Sys Vol (BSA correcte* 03/08/2023 19.3    • EDV(cubed) 03/08/2023 97.3    • LV Perez Vol (BSA correct* 03/08/2023 44.7    • LVOT area 03/08/2023 3.1    • LV mass(C)d 03/08/2023 181.2    • LVOT diam 03/08/2023 2.00    • EDV(MOD-sp4) 03/08/2023 74.5    • ESV(MOD-sp4) 03/08/2023 32.1    • SV(MOD-sp4) 03/08/2023 42.4    • SI(MOD-sp4) 03/08/2023 25.4    • EF(MOD-sp4) 03/08/2023 56.9    • MV E max jt 03/08/2023 74.5    • MV A max jt 03/08/2023 96.1    • MV dec time 03/08/2023 0.18    • MV E/A 03/08/2023 0.78    • Pulm A Revs Dur 03/08/2023 0.11    • LA ESV Index (BP) 03/08/2023 24.7    • Med Peak E' Jt 03/08/2023 5.3    • Lat Peak E' Jt 03/08/2023  5.5    • Avg E/e' ratio 03/08/2023 13.80    • SV(LVOT) 03/08/2023 66.3    • SV(RVOT) 03/08/2023 165.1    • Qp/Qs 03/08/2023 2.49    • RVIDd 03/08/2023 4.2    • RV Base 03/08/2023 4.9    • RV Mid 03/08/2023 3.8    • RV Length 03/08/2023 7.9    • TAPSE (>1.6) 03/08/2023 1.80    • RV S' 03/08/2023 6.8    • LA dimension (2D)  03/08/2023 4.0    • Pulm Sys Jt 03/08/2023 42.0    • Pulm Perez Jt 03/08/2023 31.5    • Pulm S/D 03/08/2023 1.33    • Pulm A Revs Jt 03/08/2023 23.0    • LV V1 max 03/08/2023 102.0    • LV V1 max PG 03/08/2023 4.2    • LV V1 mean PG 03/08/2023 2.00    • LV V1 VTI 03/08/2023 21.1    • Ao pk jt 03/08/2023 147.0    • Ao max PG 03/08/2023 8.6    • Ao mean PG 03/08/2023 5.0    • Ao V2 VTI 03/08/2023 31.8    • CAROLE(I,D) 03/08/2023 2.08    • MV max PG 03/08/2023 4.0    • MV mean PG 03/08/2023 2.00    • MV V2 VTI 03/08/2023 22.8    • MV P1/2t 03/08/2023 57.3    • MVA(P1/2t) 03/08/2023 3.8    • MVA(VTI) 03/08/2023 2.9    • MV dec slope 03/08/2023 460.0    • TR max jt 03/08/2023 274.0    • TR max PG 03/08/2023 30.0    • RVOT diam 03/08/2023 2.9    • RV V1 max PG 03/08/2023 6.3    • RV V1 max 03/08/2023 125.0    • RV V1 VTI 03/08/2023 25.0    • PA V2 max 03/08/2023 146.0    • PA acc time 03/08/2023 0.09    • PA pr(Accel) 03/08/2023 37.6    • Ao root diam 03/08/2023 3.4    • ACS 03/08/2023 1.30    • EF(MOD-bp) 03/08/2023 56.9    • Glucose 03/08/2023 141 (H)    Admission on 02/09/2023, Discharged on 02/09/2023   Component Date Value   • QT Interval 02/09/2023 468    • Glucose 02/09/2023 74    • BUN 02/09/2023 16    • Creatinine 02/09/2023 0.73    • Sodium 02/09/2023 142    • Potassium 02/09/2023 4.4    • Chloride 02/09/2023 103    • CO2 02/09/2023 26.0    • Calcium 02/09/2023 10.2    • Total Protein 02/09/2023 7.8    • Albumin 02/09/2023 4.9    • ALT (SGPT) 02/09/2023 25    • AST (SGOT) 02/09/2023 38 (H)    • Alkaline Phosphatase 02/09/2023 126 (H)    • Total Bilirubin 02/09/2023 0.5    • Globulin 02/09/2023  2.9    • A/G Ratio 02/09/2023 1.7    • BUN/Creatinine Ratio 02/09/2023 21.9    • Anion Gap 02/09/2023 13.0    • eGFR 02/09/2023 94.3    • HS Troponin T 02/09/2023 13 (H)    • WBC 02/09/2023 8.60    • RBC 02/09/2023 4.77    • Hemoglobin 02/09/2023 13.2    • Hematocrit 02/09/2023 41.4    • MCV 02/09/2023 86.7    • MCH 02/09/2023 27.6    • MCHC 02/09/2023 31.8    • RDW 02/09/2023 13.7    • RDW-SD 02/09/2023 41.1    • MPV 02/09/2023 7.8    • Platelets 02/09/2023 139 (L)    • Neutrophil % 02/09/2023 81.5 (H)    • Lymphocyte % 02/09/2023 13.3 (L)    • Monocyte % 02/09/2023 4.5 (L)    • Eosinophil % 02/09/2023 0.4    • Basophil % 02/09/2023 0.3    • Neutrophils, Absolute 02/09/2023 7.00    • Lymphocytes, Absolute 02/09/2023 1.10    • Monocytes, Absolute 02/09/2023 0.40    • Eosinophils, Absolute 02/09/2023 0.00    • Basophils, Absolute 02/09/2023 0.00    • nRBC 02/09/2023 0.0    • Extra Tube 02/09/2023 done    • Extra Tube 02/09/2023 Hold for add-ons.    Lab on 01/06/2023   Component Date Value   • WBC 01/06/2023 5.74    • RBC 01/06/2023 4.30    • Hemoglobin 01/06/2023 12.4    • Hematocrit 01/06/2023 38.5    • MCV 01/06/2023 89.5    • MCH 01/06/2023 28.8    • MCHC 01/06/2023 32.2    • RDW 01/06/2023 13.6    • RDW-SD 01/06/2023 43.0    • MPV 01/06/2023 9.7    • Platelets 01/06/2023 127 (L)    • Neutrophil % 01/06/2023 60.6    • Lymphocyte % 01/06/2023 30.0    • Monocyte % 01/06/2023 8.2    • Eosinophil % 01/06/2023 0.9    • Basophil % 01/06/2023 0.3    • Neutrophils, Absolute 01/06/2023 3.48    • Lymphocytes, Absolute 01/06/2023 1.72    • Monocytes, Absolute 01/06/2023 0.47    • Eosinophils, Absolute 01/06/2023 0.05    • Basophils, Absolute 01/06/2023 0.02    • Extra Tube 01/06/2023 Hold for add-ons.    • Extra Tube 01/06/2023 Hold for add-ons.    • Extra Tube 01/06/2023 Hold for add-ons.    • Glucose 01/06/2023 95    • BUN 01/06/2023 21    • Creatinine 01/06/2023 0.87    • Sodium 01/06/2023 139    • Potassium 01/06/2023  4.5    • Chloride 01/06/2023 102    • CO2 01/06/2023 26.0    • Calcium 01/06/2023 9.6    • Total Protein 01/06/2023 7.2    • Albumin 01/06/2023 4.2    • ALT (SGPT) 01/06/2023 25    • AST (SGOT) 01/06/2023 30    • Alkaline Phosphatase 01/06/2023 121 (H)    • Total Bilirubin 01/06/2023 0.3    • Globulin 01/06/2023 3.0    • A/G Ratio 01/06/2023 1.4    • BUN/Creatinine Ratio 01/06/2023 24.1    • Anion Gap 01/06/2023 11.0    • eGFR 01/06/2023 76.4    • CA 15-3 01/06/2023 124.0 (H)    • CA 27.29 01/06/2023 181.8 (H)    Office Visit on 01/06/2023   Component Date Value   • CEA 01/06/2023 32.70    Lab on 11/14/2022   Component Date Value   • WBC 11/14/2022 5.40    • RBC 11/14/2022 4.76    • Hemoglobin 11/14/2022 13.5    • Hematocrit 11/14/2022 42.2    • MCV 11/14/2022 88.7    • MCH 11/14/2022 28.4    • MCHC 11/14/2022 32.0    • RDW 11/14/2022 13.1    • RDW-SD 11/14/2022 41.3    • MPV 11/14/2022 9.8    • Platelets 11/14/2022 118 (L)    • Neutrophil % 11/14/2022 67.2    • Lymphocyte % 11/14/2022 23.1    • Monocyte % 11/14/2022 8.0    • Eosinophil % 11/14/2022 1.3    • Basophil % 11/14/2022 0.4    • Neutrophils, Absolute 11/14/2022 3.63    • Lymphocytes, Absolute 11/14/2022 1.25    • Monocytes, Absolute 11/14/2022 0.43    • Eosinophils, Absolute 11/14/2022 0.07    • Basophils, Absolute 11/14/2022 0.02    • Extra Tube 11/14/2022 Hold for add-ons.      Recent labs reviewed and interpreted for clinical significance and application            Level of Care:           Stanley Lopez MD  05/01/23  .

## 2023-05-03 ENCOUNTER — TELEPHONE (OUTPATIENT)
Dept: ONCOLOGY | Facility: CLINIC | Age: 63
End: 2023-05-03
Payer: MEDICARE

## 2023-05-03 NOTE — TELEPHONE ENCOUNTER
"Received a call from the pt stating that she has been experiencing burning in her right foot. She said that it hurts and feels like \"electricity\" up to her ankle. She also stated that she has diabetes. I told her that it is likely related to her diabetes, not the medications she is receiving through us. She stated that she would reach out to her podiatrist. I spoke to CIRO Weaver who was in agreement that there was likely another cause for the pain and that those causes should be ruled out before further workup is done from an oncology standpoint.   "

## 2023-05-04 ENCOUNTER — TELEPHONE (OUTPATIENT)
Dept: CARDIOLOGY | Facility: CLINIC | Age: 63
End: 2023-05-04
Payer: MEDICARE

## 2023-05-04 NOTE — TELEPHONE ENCOUNTER
Patient stated she received a call from Zandra yesterday to go over her visit with John on 5/1/2023 and at the time the patient couldn't talk. She stated Zandra told her she would call her back after 3:30 and she never heard back. She wasn't sure what is was regarding.

## 2023-05-05 ENCOUNTER — OFFICE VISIT (OUTPATIENT)
Dept: FAMILY MEDICINE CLINIC | Facility: CLINIC | Age: 63
End: 2023-05-05
Payer: MEDICARE

## 2023-05-05 ENCOUNTER — HOSPITAL ENCOUNTER (OUTPATIENT)
Dept: ONCOLOGY | Facility: HOSPITAL | Age: 63
Discharge: HOME | End: 2023-05-05
Payer: MEDICARE

## 2023-05-05 VITALS
OXYGEN SATURATION: 96 % | WEIGHT: 149 LBS | HEART RATE: 72 BPM | HEIGHT: 60 IN | DIASTOLIC BLOOD PRESSURE: 82 MMHG | SYSTOLIC BLOOD PRESSURE: 139 MMHG | BODY MASS INDEX: 29.25 KG/M2 | TEMPERATURE: 98.1 F

## 2023-05-05 DIAGNOSIS — Z11.2 SCREENING FOR STREPTOCOCCAL INFECTION: ICD-10-CM

## 2023-05-05 DIAGNOSIS — C79.51 MALIGNANT NEOPLASM METASTATIC TO BONE: Primary | ICD-10-CM

## 2023-05-05 DIAGNOSIS — J02.9 ACUTE SORE THROAT: ICD-10-CM

## 2023-05-05 DIAGNOSIS — R09.81 SINUS CONGESTION: ICD-10-CM

## 2023-05-05 DIAGNOSIS — R05.1 ACUTE COUGH: Primary | ICD-10-CM

## 2023-05-05 LAB
EXPIRATION DATE: NORMAL
INTERNAL CONTROL: NORMAL
Lab: NORMAL
S PYO AG THROAT QL: NEGATIVE

## 2023-05-05 PROCEDURE — 3079F DIAST BP 80-89 MM HG: CPT | Performed by: STUDENT IN AN ORGANIZED HEALTH CARE EDUCATION/TRAINING PROGRAM

## 2023-05-05 PROCEDURE — 87880 STREP A ASSAY W/OPTIC: CPT | Performed by: STUDENT IN AN ORGANIZED HEALTH CARE EDUCATION/TRAINING PROGRAM

## 2023-05-05 PROCEDURE — 99213 OFFICE O/P EST LOW 20 MIN: CPT | Performed by: STUDENT IN AN ORGANIZED HEALTH CARE EDUCATION/TRAINING PROGRAM

## 2023-05-05 PROCEDURE — 3075F SYST BP GE 130 - 139MM HG: CPT | Performed by: STUDENT IN AN ORGANIZED HEALTH CARE EDUCATION/TRAINING PROGRAM

## 2023-05-05 RX ORDER — DOXYCYCLINE HYCLATE 100 MG/1
100 CAPSULE ORAL 2 TIMES DAILY
Qty: 14 CAPSULE | Refills: 0 | Status: SHIPPED | OUTPATIENT
Start: 2023-05-05 | End: 2023-05-12

## 2023-05-05 RX ORDER — LAMOTRIGINE 25 MG/1
500 TABLET ORAL ONCE
OUTPATIENT
Start: 2023-05-05

## 2023-05-05 RX ORDER — FLUTICASONE PROPIONATE 50 MCG
2 SPRAY, SUSPENSION (ML) NASAL DAILY
Qty: 16 G | Refills: 1 | Status: SHIPPED | OUTPATIENT
Start: 2023-05-05

## 2023-05-05 NOTE — PROGRESS NOTES
"Chief Complaint  Chief Complaint   Patient presents with   • Cough     Coughed up dark green stuff, at night she starts poring sweat and can't breath, in mornings she feels better then she starts poring sweat just walking in here, she has SOB.   • Sore Throat     It all started Monday and by it was a lot worse.   • Earache   • Eye Problem     Right eye feels like it is being pushed out with pressure. She missed her cancer injections today (hormone blocker) since she was getting sick from taking the other kind.     Subjective        Gladys Garrison is a 62 y.o. female who presents to [facility].    History of Present Illness  Throat pain since Monday with hoarseness that comes and goes.  She has R ear ain, R eye discomfort.  She is having some trouble breathing, coughing that is productive.  Today the phlegm was dark green.  She is hot and sweaty, no documented fevers.  No sick contacts that she is aware of.  She says she had a L ear infection a couple weeks ago treated with cefdinir.      Objective   /82   Pulse 72   Temp 98.1 °F (36.7 °C) (Oral)   Ht 152.4 cm (60\")   Wt 67.6 kg (149 lb)   SpO2 96%   BMI 29.10 kg/m²     Estimated body mass index is 29.1 kg/m² as calculated from the following:    Height as of this encounter: 152.4 cm (60\").    Weight as of this encounter: 67.6 kg (149 lb).     Physical Exam   GEN: In no acute distress, fatigued appearing  HEENT: Pupils equal and reactive to light, sclera clear. Mucous membranes moist. Oropharynx without erythema or exudate. No cervical or submandibular lymphadenopathy.  TMs Aishwarya bilaterally  CV: Regular rate and rhythm, no murmurs  RESP: Lungs clear to auscultation anteriorly and posteriorly in all lung fields bilaterally.  Frequent cough, dry.  No increased work of breathing.    Result Review :              Assessment and Plan     Diagnoses and all orders for this visit:    1. Acute cough (Primary)  Likely started off " as allergies and progressed to possible bacterial component given the change in the color of her sputum and the worsening cough.  Strep test negative in office today.  We will treat with doxycycline 100 mg twice daily x7 days, I am being more aggressive considering her significant metastases.  We will also start Flonase 2 sprays in each nostril daily for allergic rhinitis and to help with sinus congestion/drainage.  Encourage plenty of fluid intake.  Let me know if any worsening.  She has a PET scan scheduled for next Friday.  -     doxycycline (VIBRAMYCIN) 100 MG capsule; Take 1 capsule by mouth 2 (Two) Times a Day for 7 days.  Dispense: 14 capsule; Refill: 0    2. Acute sore throat  -     doxycycline (VIBRAMYCIN) 100 MG capsule; Take 1 capsule by mouth 2 (Two) Times a Day for 7 days.  Dispense: 14 capsule; Refill: 0    3. Screening for streptococcal infection  -     POCT rapid strep A    4. Sinus congestion  -     fluticasone (FLONASE) 50 MCG/ACT nasal spray; 2 sprays into the nostril(s) as directed by provider Daily.  Dispense: 16 g; Refill: 1

## 2023-05-05 NOTE — LETTER
May 5, 2023     Patient: Gladys Garrison   YOB: 1960   Date of Visit: 5/5/2023       To Whom It May Concern:    Please excuse Gladys Garrison from work for 5/4/23.  She was seen in our office on 5/5.  She is clear to return to work on 5/9/23.  If you have further questions feel free to contact our office.             Sincerely,        Chirag Robles,     CC: No Recipients

## 2023-05-10 ENCOUNTER — TELEPHONE (OUTPATIENT)
Dept: FAMILY MEDICINE CLINIC | Facility: CLINIC | Age: 63
End: 2023-05-10
Payer: MEDICARE

## 2023-05-10 ENCOUNTER — TELEPHONE (OUTPATIENT)
Dept: ONCOLOGY | Facility: CLINIC | Age: 63
End: 2023-05-10
Payer: MEDICARE

## 2023-05-10 DIAGNOSIS — R05.1 ACUTE COUGH: Primary | ICD-10-CM

## 2023-05-10 RX ORDER — AZITHROMYCIN 250 MG/1
TABLET, FILM COATED ORAL
Qty: 6 TABLET | Refills: 0 | Status: SHIPPED | OUTPATIENT
Start: 2023-05-10

## 2023-05-10 NOTE — TELEPHONE ENCOUNTER
Called patient to reschedule infusion, and let her know that Zometa was approved. Patient said she still was not feeling well, and is not sure if she wants to get the Zometa. Patient will call back when she is ready to schedule

## 2023-05-10 NOTE — TELEPHONE ENCOUNTER
Caller: Gladys Garrison    Relationship: Self    Best call back number: [phone]    What medication are you requesting: NEW ANTIBIOTIC    What are your current symptoms: FEVER, COUGH, GREEN PHLEGM     How long have you been experiencing symptoms:     Have you had these symptoms before:    [x] Yes  [] No    Have you been treated for these symptoms before:   [x] Yes  [] No    If a prescription is needed, what is your preferred pharmacy and phone number: [address] [phone] [facility] [phone]      Additional notes:

## 2023-05-10 NOTE — TELEPHONE ENCOUNTER
Caller: Gladys Garrison    Relationship: Self    Best call back number: [phone]    What form or medical record are you requesting: WORK EXCUSE    Who is requesting this form or medical record from you:     How would you like to receive the form or medical records (pick-up, mail, fax): EMAILED    If pick-up, provide patient with address and location details  [email or web address]     Timeframe paperwork needed: 5/10/23    Additional notes:    WILL NEED EXCUSE FROM 5/9/23 AND 5/10/23

## 2023-05-10 NOTE — TELEPHONE ENCOUNTER
I called and left message that the letter has been sent to her Mychart and also will e-mail today.

## 2023-05-12 ENCOUNTER — HOSPITAL ENCOUNTER (OUTPATIENT)
Dept: CT IMAGING | Facility: HOSPITAL | Age: 63
Discharge: HOME | End: 2023-05-12
Payer: MEDICARE

## 2023-05-12 DIAGNOSIS — C50.919 MALIGNANT NEOPLASM OF FEMALE BREAST, UNSPECIFIED ESTROGEN RECEPTOR STATUS, UNSPECIFIED LATERALITY, UNSPECIFIED SITE OF BREAST: ICD-10-CM

## 2023-05-12 LAB
CREAT BLDA-MCNC: 0.7 MG/DL (ref 0.6–1.3)
EGFRCR SERPLBLD CKD-EPI 2021: 99.2 ML/MIN/1.73

## 2023-05-12 PROCEDURE — 25510000001 IOPAMIDOL PER 1 ML: Performed by: INTERNAL MEDICINE

## 2023-05-12 PROCEDURE — 74177 CT ABD & PELVIS W/CONTRAST: CPT

## 2023-05-12 PROCEDURE — 82565 ASSAY OF CREATININE: CPT

## 2023-05-12 PROCEDURE — 71260 CT THORAX DX C+: CPT

## 2023-05-12 RX ADMIN — IOPAMIDOL 100 ML: 755 INJECTION, SOLUTION INTRAVENOUS at 13:25

## 2023-05-15 ENCOUNTER — HOSPITAL ENCOUNTER (OUTPATIENT)
Dept: ONCOLOGY | Facility: HOSPITAL | Age: 63
Discharge: HOME | End: 2023-05-15
Payer: MEDICARE

## 2023-05-17 ENCOUNTER — TELEPHONE (OUTPATIENT)
Dept: ONCOLOGY | Facility: CLINIC | Age: 63
End: 2023-05-17
Payer: MEDICARE

## 2023-05-19 ENCOUNTER — HOSPITAL ENCOUNTER (OUTPATIENT)
Dept: ONCOLOGY | Facility: HOSPITAL | Age: 63
Discharge: HOME | End: 2023-05-19
Payer: MEDICARE

## 2023-05-19 VITALS
HEIGHT: 60 IN | DIASTOLIC BLOOD PRESSURE: 93 MMHG | TEMPERATURE: 98.3 F | HEART RATE: 90 BPM | WEIGHT: 150 LBS | OXYGEN SATURATION: 95 % | SYSTOLIC BLOOD PRESSURE: 161 MMHG | BODY MASS INDEX: 29.45 KG/M2 | RESPIRATION RATE: 18 BRPM

## 2023-05-19 DIAGNOSIS — M81.0 OSTEOPOROSIS, UNSPECIFIED OSTEOPOROSIS TYPE, UNSPECIFIED PATHOLOGICAL FRACTURE PRESENCE: Primary | ICD-10-CM

## 2023-05-19 DIAGNOSIS — C79.51 MALIGNANT NEOPLASM METASTATIC TO BONE: ICD-10-CM

## 2023-05-19 LAB
ALBUMIN SERPL-MCNC: 4.1 G/DL (ref 3.5–5.2)
ALBUMIN/GLOB SERPL: 1.5 G/DL
ALP SERPL-CCNC: 105 U/L (ref 39–117)
ALT SERPL W P-5'-P-CCNC: 15 U/L (ref 1–33)
ANION GAP SERPL CALCULATED.3IONS-SCNC: 10 MMOL/L (ref 5–15)
AST SERPL-CCNC: 20 U/L (ref 1–32)
BASOPHILS # BLD AUTO: 0.01 10*3/MM3 (ref 0–0.2)
BASOPHILS NFR BLD AUTO: 0.2 % (ref 0–1.5)
BILIRUB SERPL-MCNC: 0.3 MG/DL (ref 0–1.2)
BUN SERPL-MCNC: 13 MG/DL (ref 8–23)
BUN/CREAT SERPL: 19.7 (ref 7–25)
CALCIUM SPEC-SCNC: 9.1 MG/DL (ref 8.6–10.5)
CHLORIDE SERPL-SCNC: 103 MMOL/L (ref 98–107)
CO2 SERPL-SCNC: 25 MMOL/L (ref 22–29)
CREAT SERPL-MCNC: 0.66 MG/DL (ref 0.57–1)
DEPRECATED RDW RBC AUTO: 40.9 FL (ref 37–54)
EGFRCR SERPLBLD CKD-EPI 2021: 100.6 ML/MIN/1.73
EOSINOPHIL # BLD AUTO: 0.08 10*3/MM3 (ref 0–0.4)
EOSINOPHIL NFR BLD AUTO: 1.4 % (ref 0.3–6.2)
ERYTHROCYTE [DISTWIDTH] IN BLOOD BY AUTOMATED COUNT: 13.4 % (ref 12.3–15.4)
GLOBULIN UR ELPH-MCNC: 2.8 GM/DL
GLUCOSE SERPL-MCNC: 157 MG/DL (ref 65–99)
HCT VFR BLD AUTO: 40.7 % (ref 34–46.6)
HGB BLD-MCNC: 13.3 G/DL (ref 12–15.9)
LYMPHOCYTES # BLD AUTO: 1.64 10*3/MM3 (ref 0.7–3.1)
LYMPHOCYTES NFR BLD AUTO: 28.5 % (ref 19.6–45.3)
MCH RBC QN AUTO: 28.5 PG (ref 26.6–33)
MCHC RBC AUTO-ENTMCNC: 32.7 G/DL (ref 31.5–35.7)
MCV RBC AUTO: 87.2 FL (ref 79–97)
MONOCYTES # BLD AUTO: 0.37 10*3/MM3 (ref 0.1–0.9)
MONOCYTES NFR BLD AUTO: 6.4 % (ref 5–12)
NEUTROPHILS NFR BLD AUTO: 3.66 10*3/MM3 (ref 1.7–7)
NEUTROPHILS NFR BLD AUTO: 63.5 % (ref 42.7–76)
PHOSPHATE SERPL-MCNC: 3.6 MG/DL (ref 2.5–4.5)
PLATELET # BLD AUTO: 117 10*3/MM3 (ref 140–450)
PMV BLD AUTO: 9.7 FL (ref 6–12)
POTASSIUM SERPL-SCNC: 3.8 MMOL/L (ref 3.5–5.2)
PROT SERPL-MCNC: 6.9 G/DL (ref 6–8.5)
RBC # BLD AUTO: 4.67 10*6/MM3 (ref 3.77–5.28)
SODIUM SERPL-SCNC: 138 MMOL/L (ref 136–145)
WBC NRBC COR # BLD: 5.76 10*3/MM3 (ref 3.4–10.8)

## 2023-05-19 PROCEDURE — 80053 COMPREHEN METABOLIC PANEL: CPT | Performed by: NURSE PRACTITIONER

## 2023-05-19 PROCEDURE — 84100 ASSAY OF PHOSPHORUS: CPT | Performed by: INTERNAL MEDICINE

## 2023-05-19 PROCEDURE — 96402 CHEMO HORMON ANTINEOPL SQ/IM: CPT

## 2023-05-19 PROCEDURE — 25010000002 FULVESTRANT PER 25 MG: Performed by: NURSE PRACTITIONER

## 2023-05-19 PROCEDURE — 85025 COMPLETE CBC W/AUTO DIFF WBC: CPT | Performed by: NURSE PRACTITIONER

## 2023-05-19 RX ORDER — LAMOTRIGINE 25 MG/1
500 TABLET ORAL ONCE
Status: COMPLETED | OUTPATIENT
Start: 2023-05-19 | End: 2023-05-19

## 2023-05-19 RX ADMIN — FULVESTRANT 500 MG: 50 INJECTION INTRAMUSCULAR at 15:22

## 2023-05-19 NOTE — PROGRESS NOTES
Patient is here for C2D1 faslodex. Labs drawn by nhung with +hemostasis. Bren has a sinus infection and finished antibiotics about a week ago and stated she had no issues currently. Patient did not get her dental taken care of, no zometa was given today. Patient tolerated treatment and was discharged home.Patient was very upset today due to her appointment being moved and she had questions about her recent scans. Patient called after discharge by Juana Rabago RN  to see if she wanted an earlier appointment with Dr. Nunez (per ajay saying it was available)-she verbalized she did and it was changed to 5/26 at 1345-patient verbalized understanding.

## 2023-05-23 NOTE — PROGRESS NOTES
Hematology/Oncology Outpatient Follow Up    PATIENT NAME:Gladys Garrison  :1960  MRN: [id number]  PRIMARY CARE PHYSICIAN: Chirag Robles DO  REFERRING PHYSICIAN: Chirag Robles, *    Chief Complaint   Patient presents with   • Follow-up     Malignant neoplasm of female breast, unspecified estrogen receptor status, unspecified laterality, unspecified site of breast        HISTORY OF PRESENT ILLNESS:     This is a 62-year-old female who multiple comorbidities including congenital abnormalities such as hypoplastic kidney, tetralogy of Fallot, coarctation of the aorta and congenital VSD status post repair.  Patient developed syncopal episode and for that reason she had she had a CT scan of the chest which showed mass in the left breast.  She had diagnostic mammogram and ultrasound which showed a 2 cm spiculated mass in the left breast at the 1 o'clock position 6 cm from the nipple.  Biopsy of the left breast mass revealed invasive moderately differentiated carcinoma ER/NM positive and HER-2/bishop negative.  Patient also had an ultrasound of the axilla which was concerning for an abnormal left axillary lymph node with cortical thickening. She apparently had an FNA of the left axillary nodule which was positive for malignancy.  On 2017 patient underwent left modified radical mastectomy, right prophylactic mastectomy and immediate breast reconstruction. She also underwent right prophylactic mastectomy.  We have had her on records suggest that patient did have multifocal disease with pT2 pN1 aM0.  The largest focus measured 3.5 cm.  2 of 11 lymph nodes were positive for metastatic disease some with extracapsular extension.  Notes that patient did receive Arimidex neoadjuvant  from 2017 to 2018 prior to her bilateral mastectomies.    Review of her note suggest that she developed cough which she attributed to anastrozole and patient was then placed on tamoxifen.  She had MammaPrint  testing which returned with low risk for relapse.    Her postop course was also complicated by left chest wall abscess which resulted in I&D and removal of the left tissue expander. She was referred for radiation treatment boards ultimately declined.    Patient was then placed on tamoxifen in April 2018 which she stopped after less than a month due to nausea and vomiting.  Patient in the interim also was diagnosed with chronic hepatitis C was seen by the hepatologist.  Tamoxifen was dose reduced to 10 mg daily with the goal to increase to 20 mg daily.      Patient has relocated to [facility] [facility].  She has transitioned her care to us now.  She is currently not on any antiestrogen therapy.     Her bilateral mastectomy specimen are available for review    · 1/29/2021 patient had bone density which showed osteoporosis  · Patient was seen by neurosurgery and had a bone scan done in March 2021 which showed subtle activity in the inferior L4 vertebral body appears to correlate with new area of sclerosis on CT of the abdomen and pelvis.  There is also subtle activity with possible new lesion at the posterior left sacrum.  These findings were concerning for metastatic disease.  PET CT scan was recommended to further evaluate.  · 4/8/2021 patient had a PET CT scan which showed evidence of disease in the neck chest abdomen and pelvis.  But she has a 1.1 cm mixed lytic/sclerotic hypermetabolic lesion within the left hemisacrum consistent with metastatic disease.  There is also accumulation within the inferior endplate of the L4 vertebral body thought to correspond to 1.2 centimeters sclerotic lesion consistent with metastatic disease.  There is a tiny hypermetabolic focus within the L3, T11.  Suspicious for also early metastatic disease.  · 4/26/2021 patient underwent CT-guided biopsy of sacral lesion, pathology was consistent with metastatic breast cancer ER and WA positive HER-2/bishop was negative.  · 7/6/21 CT new sclerosis  within the right 12th rib posteriorly which could represent metastatic lesion or a healed or healing fracture, new sclerosis within the right 12th rib posteriorly which         could represent metastatic lesion,new sclerosis within the right T8 transverse process worrisome for metastatic        disease progression.  · 7/7/21 complaints of 8/10 back pain and 8/10 LUQ pain. Referral to radiation for questionable mets on CT chest.  · 7/26/2021 patient had CT scan of the head with contrast with did not show any evidence of metastatic disease.  CT scan of the chest abdomen and pelvis did not show any evidence of progressive disease.  · 7/26/2021 patient had CEA level which shows declining values CA 15-3 has decreased to 62 from 84  · 11/3/2021: Patient had CT scan of the chest, abdomen and pelvis. In the chest there were no suspicious pulmonary nodules. There is no clear evidence of progressive disease  · 12/13/2021 patient had a bone scan which showed uptake along the posterior right ribs consistent with rib fractures.  There is mild uptake in the L4 vertebral body corresponding to the sclerotic lesion seen on previous CT scan.  This was likely due to metastatic disease  · 10/6/2022: Patient has been lost to follow-up.  She stopped Ibrance due to pulmonary issues.  Apparently patient went to the emergency room and was told that Ibrance was causing lung damage.  · October 6, 2022: She has a increasing CA 15-3 and CA 27.29 as well as CEA level.  · 10/19/2022: Patient had guardant 360 testing done which was negative  · 10/31/2022: Patient had bone scan which basically showed evidence for mild progression of multifocal osseous metastatic disease.  There appears to be new activity corresponding to the thoracic cervical spine, left scapula, left sacrum and left proximal femur.  CT scan of the chest otherwise is stable.  There is a nonobstructing stone on the left kidney.          Past Medical History:   Diagnosis Date   •  Allergic    • Bone cancer     METASTATIC BONE   • Bradycardia     SECONDARY TO ABLATION   • Breast cancer 2017    mets to lymph nodes; did not do radiation   • Cancer of unknown origin    • Compression fx, thoracic spine, open, initial encounter    • Coronary artery disease    • COVID-19 2021   • Diabetes mellitus    • Heart disease, unspecified    • Hepatitis C     RESOLVED WITH MEDICATION   • Hyperlipidemia    • Hypertension    • Obesity (BMI 30-39.9) 2021   • Rib fracture     Per patient   • Sleep apnea     no machine   • Tachycardia     ATRIAL   • Type 2 diabetes mellitus 2017       Past Surgical History:   Procedure Laterality Date   • BACK SURGERY      neck X 2   • BREAST RECONSTRUCTION Bilateral    • CARDIAC ABLATION       atrial tachycardia x 5 ablations    • CARDIAC CATHETERIZATION     • CARDIAC SURGERY      stent placed in aorta   • CARDIAC SURGERY      6 surgeries as baby    • CERVICAL FUSION ANTERIOR WITH ARTIFICIAL DISCECTOMY IMPLANTATION N/A 2020    Procedure: C4 VERTEBRECTOMY AND ANTERIOR CERIVCAL DISCECTOMY WITH FUSION OF CERVICAL THREE THROUGH FIVE WITH REMOVAL OF HARDWARE C5-C6;  Surgeon: Mark Kaba MD;  Location: [facility] MAIN OR;  Service: Neurosurgery;  Laterality: N/A;   •  SECTION      x2   • COLONOSCOPY N/A 10/23/2020    Procedure: COLONOSCOPY WITH POLYPECTOMY X6;  Surgeon: Stanley Bourne MD;  Location: [facility] ENDOSCOPY;  Service: Gastroenterology;  Laterality: N/A;  POLYPS, INTERNAL HEMORRHOIDS   • ENDOMETRIAL ABLATION      REMOVAL SCAR TISSUE UTERINE   • ENDOSCOPY N/A 10/23/2020    Procedure: ESOPHAGOGASTRODUODENOSCOPY WITH BIOPSY X 1 AREA;  Surgeon: Stanley Bourne MD;  Location: [facility] ENDOSCOPY;  Service: Gastroenterology;  Laterality: N/A;  GASTRITIS, ESOPHAGITIS, HIATAL HERNIA   • KNEE SURGERY     • MASTECTOMY Bilateral    • NECK SURGERY     • PACEMAKER IMPLANTATION     • PAIN PUMP INSERTION/REVISION N/A 2022     "Procedure: PAIN PUMP INSERTION AND INTRATHECAL CATHETER PLACEMENT;  Surgeon: Chuck Hunter MD;  Location: [facility] MAIN OR;  Service: Pain Management;  Laterality: N/A;         Current Outpatient Medications:   •  acetaminophen (TYLENOL) 650 MG 8 hr tablet, Take 1 tablet by mouth As Needed for Mild Pain. TAKES BETWEEN PAIN MEDS, Disp: , Rfl:   •  aspirin 81 MG chewable tablet, Chew 1 tablet Daily., Disp: 30 tablet, Rfl: 1  •  Blood Glucose Monitoring Suppl (Accu-Chek Araceli) device, Use as instructed   To test blood sugar bid, Disp: 1 each, Rfl: 0  •  Calcium Carbonate-Vit D-Min (Calcium 600+D Plus Minerals) 600-400 MG-UNIT chewable tablet, Chew 600 mg 2 (Two) Times a Day., Disp: 60 each, Rfl: 6  •  cefdinir (OMNICEF) 300 MG capsule, Take 1 capsule by mouth 2 (Two) Times a Day., Disp: 14 capsule, Rfl: 0  •  diclofenac (VOLTAREN) 75 MG EC tablet, Take 1 tablet by mouth 2 (Two) Times a Day., Disp: 10 tablet, Rfl: 0  •  fluticasone (FLONASE) 50 MCG/ACT nasal spray, 2 sprays into the nostril(s) as directed by provider Daily., Disp: 16 g, Rfl: 1  •  glucose blood (Accu-Chek Araceli Plus) test strip, Use as instructed   To test bid, Disp: 200 each, Rfl: 3  •  ibuprofen (ADVIL,MOTRIN) 400 MG tablet, Take 1 tablet by mouth As Needed for Mild Pain. IN BETWEEN PAIN  MEDS, Disp: , Rfl:   •  insulin NPH-insulin regular (humuLIN 70/30,novoLIN 70/30) (70-30) 100 UNIT/ML injection, Inject 40 Units under the skin into the appropriate area as directed Every Morning., Disp: , Rfl:   •  insulin NPH-insulin regular (humuLIN 70/30,novoLIN 70/30) (70-30) 100 UNIT/ML injection, Inject 30 Units under the skin into the appropriate area as directed Every Evening., Disp: , Rfl:   •  Insulin Pen Needle (B-D UF III MINI PEN NEEDLES) 31G X 5 MM misc, Use to inject insulin twice daily   DX: E11.9, Disp: 100 each, Rfl: 0  •  Insulin Syringe-Needle U-100 28G X 1/2\" 1 ML misc, 1 each 2 (Two) Times a Day., Disp: 100 each, Rfl: 6  •  Lancets (accu-chek " soft touch) lancets, Test bid, Disp: 200 each, Rfl: 3  •  lisinopril (PRINIVIL,ZESTRIL) 20 MG tablet, Take 1 tablet by mouth Daily. Takes 4-5 times per week., Disp: , Rfl:   •  ondansetron ODT (ZOFRAN-ODT) 4 MG disintegrating tablet, DISSOLVE 1 TABLET IN MOUTH EVERY 8 HOURS AS NEEDED FOR NAUSEA FOR UP TO 2 DAYS, Disp: , Rfl:   •  rosuvastatin (Crestor) 20 MG tablet, Take 1 tablet by mouth Every Night., Disp: 90 tablet, Rfl: 0    Allergies   Allergen Reactions   • Promethazine Other (See Comments)     Hyperactive mean   • Tape Other (See Comments)     .blisters         Family History   Problem Relation Age of Onset   • Heart disease Mother    • Stroke Mother    • Lung cancer Mother    • Aneurysm Father    • Diabetes Sister    • No Known Problems Brother    • No Known Problems Brother    • Diabetes type I Half-Sister    • Thyroid cancer Half-Sister    • Cancer Maternal Aunt    • Heart attack Sister    • Thyroid disease Sister    • No Known Problems Sister        Cancer-related family history includes Cancer in her maternal aunt; Lung cancer in her mother; Thyroid cancer in her half-sister.    Social History     Tobacco Use   • Smoking status: Never     Passive exposure: Never   • Smokeless tobacco: Never   Vaping Use   • Vaping Use: Never used   Substance Use Topics   • Alcohol use: Yes     Alcohol/week: 1.0 standard drink     Types: 1 Glasses of wine per week     Comment: mixed drink once a week   • Drug use: Not Currently     I have reviewed and confirmed the accuracy of the patient's history: Chief complaint, HPI, ROS and Subjective as entered by the MA/LPN/RN. Мария Nunez MD 05/26/23       SUBJECTIVE:    She is here for follow-up visit after a prolonged absence as she was last seen by me March 2022    Patient was scheduled to begin Faslodex but she has not had injection since last visit.  She comes in complaining of generalized body aches and pains.  She has not discontinued Arimidex    She has been  "noncompliant with treatment recommendations.  She has not had any doses of Faslodex despite being recommended 3 months ago.      Current is now getting Faslodex.  She recently sustained compression fracture of the lumbar spine.  She follows up very closely with pain management.        REVIEW OF SYSTEMS:     Review of Systems   Constitutional: Negative for chills and fever.   HENT: Negative for ear pain, mouth sores, nosebleeds and sore throat.    Eyes: Negative for photophobia and visual disturbance.   Respiratory: Negative for wheezing and stridor.    Cardiovascular: Negative for chest pain and palpitations.   Gastrointestinal: Negative for abdominal pain, diarrhea, nausea and vomiting.   Endocrine: Negative for cold intolerance and heat intolerance.   Genitourinary: Negative for dysuria and hematuria.   Musculoskeletal: Negative for joint swelling and neck stiffness.   Skin: Negative for color change and rash.   Neurological: Negative for seizures and syncope.   Hematological: Negative for adenopathy.        No obvious bleeding   Psychiatric/Behavioral: Negative for agitation, confusion and hallucinations.     OBJECTIVE:    Vitals:    05/26/23 1407   BP: (!) 192/96   Pulse: 74   Resp: 18   Temp: 98 °F (36.7 °C)   TempSrc: Infrared   SpO2: 93%   Weight: 67.6 kg (149 lb)   Height: 154.9 cm (61\")   PainLoc: Comment: A lot of Pain all over     Body mass index is 28.15 kg/m².    ECOG    (1) Restricted in physically strenuous activity, ambulatory and able to do work of light nature    Physical Exam  Vitals and nursing note reviewed.   Constitutional:       General: She is not in acute distress.     Appearance: Normal appearance. She is not diaphoretic.   HENT:      Head: Normocephalic and atraumatic.      Mouth/Throat:      Mouth: Mucous membranes are moist.      Pharynx: Oropharynx is clear.   Eyes:      General: No scleral icterus.        Right eye: No discharge.         Left eye: No discharge.      Extraocular " Movements: Extraocular movements intact.      Conjunctiva/sclera: Conjunctivae normal.   Neck:      Thyroid: No thyromegaly.   Cardiovascular:      Rate and Rhythm: Normal rate and regular rhythm.      Pulses: Normal pulses.      Heart sounds: Normal heart sounds.     No friction rub. No gallop.   Pulmonary:      Effort: Pulmonary effort is normal. No respiratory distress.      Breath sounds: Normal breath sounds. No stridor. No wheezing.   Abdominal:      General: Bowel sounds are normal. There is distension.      Palpations: Abdomen is soft. There is no mass.      Tenderness: There is abdominal tenderness. There is guarding (Left upper abdomen). There is no rebound.   Musculoskeletal:         General: No tenderness. Normal range of motion.      Cervical back: Normal range of motion and neck supple.      Right lower leg: No edema.      Left lower leg: No edema.      Comments: Neck pain.  Patient has a neck collar.   Lymphadenopathy:      Cervical: No cervical adenopathy.   Skin:     General: Skin is warm and dry.      Capillary Refill: Capillary refill takes less than 2 seconds.      Findings: No bruising, erythema or rash.   Neurological:      Mental Status: She is alert and oriented to person, place, and time.      Cranial Nerves: No cranial nerve deficit.      Sensory: No sensory deficit.      Motor: No abnormal muscle tone.   Psychiatric:         Mood and Affect: Mood normal.         Behavior: Behavior normal.         Thought Content: Thought content normal.         Judgment: Judgment normal.       I have reexamined the patient and the results are consistent with the previously documented exam. Мария Nunez MD     RECENT LABS    WBC   Date Value Ref Range Status   05/24/2023 4.80 3.40 - 10.80 10*3/mm3 Final     RBC   Date Value Ref Range Status   05/24/2023 5.12 3.77 - 5.28 10*6/mm3 Final     Hemoglobin   Date Value Ref Range Status   05/24/2023 16.0 12.0 - 17.0 g/dL Final   05/24/2023 14.3 12.0 -  15.9 g/dL Final     Hematocrit   Date Value Ref Range Status   05/24/2023 47 38 - 51 % Final   05/24/2023 45.2 34.0 - 46.6 % Final     MCV   Date Value Ref Range Status   05/24/2023 88.3 79.0 - 97.0 fL Final     MCH   Date Value Ref Range Status   05/24/2023 27.9 26.6 - 33.0 pg Final     MCHC   Date Value Ref Range Status   05/24/2023 31.6 31.5 - 35.7 g/dL Final     RDW   Date Value Ref Range Status   05/24/2023 13.9 12.3 - 15.4 % Final     RDW-SD   Date Value Ref Range Status   05/24/2023 43.3 37.0 - 54.0 fl Final     MPV   Date Value Ref Range Status   05/24/2023 8.1 6.0 - 12.0 fL Final     Platelets   Date Value Ref Range Status   05/24/2023 116 (L) 140 - 450 10*3/mm3 Final     Neutrophil %   Date Value Ref Range Status   05/24/2023 71.8 42.7 - 76.0 % Final     Lymphocyte %   Date Value Ref Range Status   05/24/2023 20.3 19.6 - 45.3 % Final     Monocyte %   Date Value Ref Range Status   05/24/2023 6.1 5.0 - 12.0 % Final     Eosinophil %   Date Value Ref Range Status   05/24/2023 1.5 0.3 - 6.2 % Final     Basophil %   Date Value Ref Range Status   05/24/2023 0.3 0.0 - 1.5 % Final     Immature Grans %   Date Value Ref Range Status   07/20/2020 0.7 (H) 0.0 - 0.5 % Final     Neutrophils, Absolute   Date Value Ref Range Status   05/24/2023 3.40 1.70 - 7.00 10*3/mm3 Final     Lymphocytes, Absolute   Date Value Ref Range Status   05/24/2023 1.00 0.70 - 3.10 10*3/mm3 Final     Monocytes, Absolute   Date Value Ref Range Status   05/24/2023 0.30 0.10 - 0.90 10*3/mm3 Final     Eosinophils, Absolute   Date Value Ref Range Status   05/24/2023 0.10 0.00 - 0.40 10*3/mm3 Final     Basophils, Absolute   Date Value Ref Range Status   05/24/2023 0.00 0.00 - 0.20 10*3/mm3 Final     Immature Grans, Absolute   Date Value Ref Range Status   07/20/2020 0.05 0.00 - 0.05 10*3/mm3 Final     nRBC   Date Value Ref Range Status   05/24/2023 0.0 0.0 - 0.2 /100 WBC Final       Lab Results   Component Value Date    GLUCOSE 115 (H) 05/24/2023     BUN 13 05/24/2023    CREATININE 0.63 05/24/2023    EGFRIFNONA 70 12/20/2021    EGFRIFAFRI >60 10/12/2018    BCR 20.6 05/24/2023    K 4.0 05/24/2023    CO2 24.0 05/24/2023    CALCIUM 8.7 05/24/2023    PROTENTOTREF 7.4 12/14/2020    ALBUMIN 4.0 05/24/2023    LABIL2 0.9 12/14/2020    AST 23 05/24/2023    ALT 15 05/24/2023       ASSESSMENT:    · Metastatic breast cancer presenting as 1.1 cm mixed lytic/sclerotic hypermetabolic lesion in the left hemisacrum suspicious for metastatic disease 1.2 cm sclerotic lesion on L4, small L3 lesion and T11 lesion.  Tumor is ER positive, NV positive and HER-2/bishop negative.  Patient was prescribed combination of Ibrance and Arimidex.  She discontinued Ibrance a few months ago due to pulmonary issues.  Patient has not been to the office since March 2022.  She is currently on single agent Arimidex but due to evidence of collateral progression, will discontinue Arimidex and switched to Faslodex.  We will see if this works if it does not work then would add a different CDK 4 inhibitor.  Reviewed with patient.  Discussed the benefits and side effects of Faslodex with her.  She was encouraged to start Faslodex as recommended.  She will be reassessed again in 6 weeks.  Patient has been on compliant with her treatment for her malignancy.  We have addressed this issue today and she understands that progressive disease is expected if no treatment is received  · Bone metastasis:.  Biphosphonate's has been recommended patient has not been able to have due to ongoing dental  · Medical noncompliance  · Pancytopenia secondary to Ibrance: Improved off Ibrance  · Ongoing back pain issues: Reviewed her bone scan . MRI of the lumbar and pelvis area ordered but not done due to pacemaker. We will also give patient referral to pain management with Dr. Hunter.  Continue Percocet to 10 mg every 4-6 hours as needed for pain.  Patient encouraged to follow-up with pain management   · New compression fractures  L1 vertebral body: We will refer to Dr. Ruff  · ypT2 N1 aM0 status post left modified radical mastectomy with lymph node dissection and right prophylactic mastectomy in 2017 performed at [facility].  ER positive, MN positive and HER-2/bishop negative.  Status post bilateral chest wall reconstruction.  According to patient, she tolerated Arimidex very well except that she also had osteoporosis therefore Arimidex was discontinued at that time  · Intolerance of tamoxifen in the past  · Osteoporosis: We will transition to Xgeva since she has bone metastases  · Status post neoadjuvant Arimidex prior to bilateral mastectomies  · Thrombocytopenia: Work-up was - December 2020  · Neck pain status post cervical spine surgery: Resolved  · Complex cardiac medical history including tetralogy of Fallot, coarctation of aorta, VSD status post repair.  Status post stent placement for coarctation of aorta  · Personal history and strong family history of breast cancer in multiple in the relatives on paternal side of the family including 4 paternal aunts in their 30s and 40s and 2 maternal aunts at age of 20s and 50s.  There is concern for possible hereditary breast cancer syndrome.  Patient may be  interested in pursuing cancer genetics for her management.  · Assessment has been reviewed and updated          Discussion    Patient has metastatic breast cancer estrogen and progesterone dependent and HER-2/bishop negative.  She is currently on Arimidex.  We will add Ibrance.  We will also discontinue Prolia and begin Xgeva to help reduce skeletal events.           Plans:     · Obtain  CT scan chest abdomen and pelvis will be due in August 2023  · Checked tumor markers for CEA CA 15-3 and CA 27.29 reviewed  · Continue Faslodex and continue Zometa once her dental issues have been addressed.  Patient has been encouraged to have this accomplished  · Refer to Dr. Ruff for compression deformity L1 vertebral body.  Patient  not able to have MRI due to cardiac pacemaker.  Cardiologist is Dr. Lopez  · Guardant 360 for NGS testing was negative for any actionable mutations  · Discontinued Arimidex and currently on Faslodex.  We will continue the same  · Follow-up with pain management with Dr. Hunter  · Guardian 360 does not show any actionable mutation.  Would consider soft tissue biopsy at time of progression for additional NGS testing.  Reviewed with patient  · Advised patient to remain compliant with follow-up  · Referred to pulmonary for her dyspnea: Dr. Julio.  Appointment is pending  · Follow-up with pain management for ongoing pain issues  · Follow-up with /counselor   · Reviewed work-up for thrombocytopenia which was negative  · Reviewed her bone density which showed osteoporosis  · Schedule Xgeva 120 mg subcu monthly once her dental procedures are completed  · All questions answered  · Follow-up in 6 weeks or earlier as needed  · All questions answered            Patient verbalized understanding and is in agreement of the above plan.                I spent 40 total minutes, face-to-face, caring for Gladys today. 90% of this time involved counseling and/or coordination of care as documented within this note.

## 2023-05-24 ENCOUNTER — HOSPITAL ENCOUNTER (EMERGENCY)
Facility: HOSPITAL | Age: 63
Discharge: HOME | End: 2023-05-24
Attending: EMERGENCY MEDICINE | Admitting: EMERGENCY MEDICINE
Payer: MEDICARE

## 2023-05-24 ENCOUNTER — APPOINTMENT (OUTPATIENT)
Dept: CT IMAGING | Facility: HOSPITAL | Age: 63
End: 2023-05-24
Payer: MEDICARE

## 2023-05-24 VITALS
DIASTOLIC BLOOD PRESSURE: 57 MMHG | HEIGHT: 61 IN | WEIGHT: 149.47 LBS | RESPIRATION RATE: 18 BRPM | TEMPERATURE: 98.3 F | SYSTOLIC BLOOD PRESSURE: 106 MMHG | BODY MASS INDEX: 28.22 KG/M2 | OXYGEN SATURATION: 92 % | HEART RATE: 70 BPM
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
## 2023-05-24 DIAGNOSIS — M54.50 MIDLINE LOW BACK PAIN WITHOUT SCIATICA, UNSPECIFIED CHRONICITY: ICD-10-CM

## 2023-05-24 DIAGNOSIS — N39.0 URINARY TRACT INFECTION WITHOUT HEMATURIA, SITE UNSPECIFIED: ICD-10-CM

## 2023-05-24 LAB
ALBUMIN SERPL-MCNC: 4 G/DL (ref 3.5–5.2)
ALBUMIN/GLOB SERPL: 1.4 G/DL
ALP SERPL-CCNC: 78 U/L (ref 39–117)
ALT SERPL W P-5'-P-CCNC: 15 U/L (ref 1–33)
ANION GAP SERPL CALCULATED.3IONS-SCNC: 15 MMOL/L (ref 10–20)
ANION GAP SERPL CALCULATED.3IONS-SCNC: 8 MMOL/L (ref 5–15)
AST SERPL-CCNC: 23 U/L (ref 1–32)
BACTERIA UR QL AUTO: ABNORMAL /HPF
BASOPHILS # BLD AUTO: 0 10*3/MM3 (ref 0–0.2)
BASOPHILS NFR BLD AUTO: 0.3 % (ref 0–1.5)
BILIRUB SERPL-MCNC: 0.4 MG/DL (ref 0–1.2)
BILIRUB UR QL STRIP: NEGATIVE
BUN BLDA-MCNC: 16 MG/DL (ref 8–26)
BUN SERPL-MCNC: 13 MG/DL (ref 8–23)
BUN/CREAT SERPL: 20.6 (ref 7–25)
CA-I BLDA-SCNC: 1.02 MMOL/L (ref 1.12–1.32)
CALCIUM SPEC-SCNC: 8.7 MG/DL (ref 8.6–10.5)
CHLORIDE BLDA-SCNC: 107 MMOL/L (ref 98–109)
CHLORIDE SERPL-SCNC: 109 MMOL/L (ref 98–107)
CLARITY UR: CLEAR
CO2 BLDA-SCNC: 24 MMOL/L (ref 24–29)
CO2 SERPL-SCNC: 24 MMOL/L (ref 22–29)
COLOR UR: YELLOW
CREAT BLDA-MCNC: 0.7 MG/DL (ref 0.6–1.3)
CREAT SERPL-MCNC: 0.63 MG/DL (ref 0.57–1)
DEPRECATED RDW RBC AUTO: 43.3 FL (ref 37–54)
EGFRCR SERPLBLD CKD-EPI 2021: 101.7 ML/MIN/1.73
EGFRCR SERPLBLD CKD-EPI 2021: 99.2 ML/MIN/1.73
EOSINOPHIL # BLD AUTO: 0.1 10*3/MM3 (ref 0–0.4)
EOSINOPHIL NFR BLD AUTO: 1.5 % (ref 0.3–6.2)
ERYTHROCYTE [DISTWIDTH] IN BLOOD BY AUTOMATED COUNT: 13.9 % (ref 12.3–15.4)
GLOBULIN UR ELPH-MCNC: 2.8 GM/DL
GLUCOSE BLDC GLUCOMTR-MCNC: 98 MG/DL (ref 70–105)
GLUCOSE SERPL-MCNC: 115 MG/DL (ref 65–99)
GLUCOSE UR STRIP-MCNC: NEGATIVE MG/DL
HCT VFR BLD AUTO: 45.2 % (ref 34–46.6)
HCT VFR BLDA CALC: 47 % (ref 38–51)
HGB BLD-MCNC: 14.3 G/DL (ref 12–15.9)
HGB BLDA-MCNC: 16 G/DL (ref 12–17)
HGB UR QL STRIP.AUTO: NEGATIVE
HYALINE CASTS UR QL AUTO: ABNORMAL /LPF
KETONES UR QL STRIP: NEGATIVE
LEUKOCYTE ESTERASE UR QL STRIP.AUTO: ABNORMAL
LYMPHOCYTES # BLD AUTO: 1 10*3/MM3 (ref 0.7–3.1)
LYMPHOCYTES NFR BLD AUTO: 20.3 % (ref 19.6–45.3)
MCH RBC QN AUTO: 27.9 PG (ref 26.6–33)
MCHC RBC AUTO-ENTMCNC: 31.6 G/DL (ref 31.5–35.7)
MCV RBC AUTO: 88.3 FL (ref 79–97)
MONOCYTES # BLD AUTO: 0.3 10*3/MM3 (ref 0.1–0.9)
MONOCYTES NFR BLD AUTO: 6.1 % (ref 5–12)
NEUTROPHILS NFR BLD AUTO: 3.4 10*3/MM3 (ref 1.7–7)
NEUTROPHILS NFR BLD AUTO: 71.8 % (ref 42.7–76)
NITRITE UR QL STRIP: NEGATIVE
NRBC BLD AUTO-RTO: 0 /100 WBC (ref 0–0.2)
PH UR STRIP.AUTO: 5.5 [PH] (ref 5–8)
PLATELET # BLD AUTO: 116 10*3/MM3 (ref 140–450)
PMV BLD AUTO: 8.1 FL (ref 6–12)
POTASSIUM BLDA-SCNC: 4.3 MMOL/L (ref 3.5–4.9)
POTASSIUM SERPL-SCNC: 4 MMOL/L (ref 3.5–5.2)
PROT SERPL-MCNC: 6.8 G/DL (ref 6–8.5)
PROT UR QL STRIP: NEGATIVE
RBC # BLD AUTO: 5.12 10*6/MM3 (ref 3.77–5.28)
RBC # UR STRIP: ABNORMAL /HPF
REF LAB TEST METHOD: ABNORMAL
RENAL EPI CELLS #/AREA URNS HPF: ABNORMAL /HPF
SODIUM BLD-SCNC: 141 MMOL/L (ref 138–146)
SODIUM SERPL-SCNC: 141 MMOL/L (ref 136–145)
SP GR UR STRIP: 1.02 (ref 1–1.03)
SQUAMOUS #/AREA URNS HPF: ABNORMAL /HPF
UROBILINOGEN UR QL STRIP: ABNORMAL
WBC # UR STRIP: ABNORMAL /HPF
WBC NRBC COR # BLD: 4.8 10*3/MM3 (ref 3.4–10.8)

## 2023-05-24 PROCEDURE — 80053 COMPREHEN METABOLIC PANEL: CPT

## 2023-05-24 PROCEDURE — 25010000002 KETOROLAC TROMETHAMINE PER 15 MG

## 2023-05-24 PROCEDURE — 85014 HEMATOCRIT: CPT

## 2023-05-24 PROCEDURE — 85025 COMPLETE CBC W/AUTO DIFF WBC: CPT

## 2023-05-24 PROCEDURE — 80047 BASIC METABLC PNL IONIZED CA: CPT

## 2023-05-24 PROCEDURE — 72131 CT LUMBAR SPINE W/O DYE: CPT

## 2023-05-24 PROCEDURE — 99284 EMERGENCY DEPT VISIT MOD MDM: CPT

## 2023-05-24 PROCEDURE — 81001 URINALYSIS AUTO W/SCOPE: CPT

## 2023-05-24 PROCEDURE — 87086 URINE CULTURE/COLONY COUNT: CPT

## 2023-05-24 PROCEDURE — 96374 THER/PROPH/DIAG INJ IV PUSH: CPT

## 2023-05-24 RX ORDER — DICLOFENAC SODIUM 75 MG/1
75 TABLET, DELAYED RELEASE ORAL 2 TIMES DAILY
Qty: 10 TABLET | Refills: 0 | Status: SHIPPED | OUTPATIENT
Start: 2023-05-24

## 2023-05-24 RX ORDER — SODIUM CHLORIDE 0.9 % (FLUSH) 0.9 %
10 SYRINGE (ML) INJECTION AS NEEDED
Status: DISCONTINUED | OUTPATIENT
Start: 2023-05-24 | End: 2023-05-24 | Stop reason: HOSPADM

## 2023-05-24 RX ORDER — CEFDINIR 300 MG/1
300 CAPSULE ORAL 2 TIMES DAILY
Qty: 14 CAPSULE | Refills: 0 | Status: SHIPPED | OUTPATIENT
Start: 2023-05-24

## 2023-05-24 RX ORDER — TIZANIDINE 4 MG/1
4 TABLET ORAL ONCE
Status: COMPLETED | OUTPATIENT
Start: 2023-05-24 | End: 2023-05-24

## 2023-05-24 RX ORDER — KETOROLAC TROMETHAMINE 15 MG/ML
15 INJECTION, SOLUTION INTRAMUSCULAR; INTRAVENOUS ONCE
Status: COMPLETED | OUTPATIENT
Start: 2023-05-24 | End: 2023-05-24

## 2023-05-24 RX ADMIN — TIZANIDINE 4 MG: 4 TABLET ORAL at 07:39

## 2023-05-24 RX ADMIN — KETOROLAC TROMETHAMINE 15 MG: 15 INJECTION, SOLUTION INTRAMUSCULAR; INTRAVENOUS at 08:25

## 2023-05-24 NOTE — Clinical Note
[facility] EMERGENCY DEPARTMENT  [address] [place] [address]  Phone: [phone]    Gladys Garrison was seen and treated in our emergency department on 5/24/2023.  She may return to work on 05/27/2023.         Thank you for choosing [facility].    Aruna Hanson RN

## 2023-05-24 NOTE — DISCHARGE INSTRUCTIONS
Prescription for diclofenac sent to your preferred pharmacy take as prescribed  Can use over-the-counter lidocaine patches as needed    Prescription for cefdinir sent to your preferred pharmacy    Follow-up with oncology    Follow-up with spine specialist as needed    Follow-up with primary care    Return to the ER for new or worsening symptoms

## 2023-05-24 NOTE — ED PROVIDER NOTES
"Subjective   History of Present Illness  Chief Complaint: Low back pain       HPI: patient is a 62-year-old female with a known history of breast cancer with mets to the bone, diabetes mellitus, pacemaker, hyperlipidemia, hypertension, presents to the emergency room by private vehicle complaining of back pain.  She states that 2 days ago she believes she had an hypoglycemic episode as this is what her  reports, she does not remember the incident.  He reportedly told her that he heard her fall and when he arrived to her she was on the floor near their stairs, she does not believe that she fell down her stairs because she states \"I would be dead if I had fallen down the stairs\" complains of low back pain worse with certain movements she states that Tylenol ibuprofen is taken the edge off the pain.  She denies saddle anesthesia she has chronic numbness and tingling in her lower extremities, she does report difficulty urinating since the reported fall.    PCP: Travis     History provided by:  Patient      Review of Systems   Genitourinary: Positive for difficulty urinating.   Musculoskeletal: Positive for back pain.       Past Medical History:   Diagnosis Date   • Allergic    • Bone cancer     METASTATIC BONE   • Bradycardia     SECONDARY TO ABLATION   • Breast cancer 2017    mets to lymph nodes; did not do radiation   • Cancer of unknown origin    • Compression fx, thoracic spine, open, initial encounter    • Coronary artery disease    • COVID-19 09/01/2021   • Diabetes mellitus    • Heart disease, unspecified    • Hepatitis C     RESOLVED WITH MEDICATION   • Hyperlipidemia    • Hypertension    • Obesity (BMI 30-39.9) 02/05/2021   • Rib fracture     Per patient   • Sleep apnea     no machine   • Tachycardia     ATRIAL   • Type 2 diabetes mellitus 11/2017       Allergies   Allergen Reactions   • Promethazine Other (See Comments)     Hyperactive mean   • Tape Other (See Comments)     .blisters         Past " Surgical History:   Procedure Laterality Date   • BACK SURGERY      neck X 2   • BREAST RECONSTRUCTION Bilateral    • CARDIAC ABLATION       atrial tachycardia x 5 ablations    • CARDIAC CATHETERIZATION     • CARDIAC SURGERY      stent placed in aorta   • CARDIAC SURGERY      6 surgeries as baby    • CERVICAL FUSION ANTERIOR WITH ARTIFICIAL DISCECTOMY IMPLANTATION N/A 2020    Procedure: C4 VERTEBRECTOMY AND ANTERIOR CERIVCAL DISCECTOMY WITH FUSION OF CERVICAL THREE THROUGH FIVE WITH REMOVAL OF HARDWARE C5-C6;  Surgeon: Mark Kaba MD;  Location: [facility];  Service: Neurosurgery;  Laterality: N/A;   •  SECTION      x2   • COLONOSCOPY N/A 10/23/2020    Procedure: COLONOSCOPY WITH POLYPECTOMY X6;  Surgeon: Stanely Bourne MD;  Location: [facility];  Service: Gastroenterology;  Laterality: N/A;  POLYPS, INTERNAL HEMORRHOIDS   • ENDOMETRIAL ABLATION      REMOVAL SCAR TISSUE UTERINE   • ENDOSCOPY N/A 10/23/2020    Procedure: ESOPHAGOGASTRODUODENOSCOPY WITH BIOPSY X 1 AREA;  Surgeon: Stanley Bourne MD;  Location: [facility];  Service: Gastroenterology;  Laterality: N/A;  GASTRITIS, ESOPHAGITIS, HIATAL HERNIA   • KNEE SURGERY     • MASTECTOMY Bilateral    • NECK SURGERY     • PACEMAKER IMPLANTATION     • PAIN PUMP INSERTION/REVISION N/A 2022    Procedure: PAIN PUMP INSERTION AND INTRATHECAL CATHETER PLACEMENT;  Surgeon: Chuck Hunter MD;  Location: [facility];  Service: Pain Management;  Laterality: N/A;       Family History   Problem Relation Age of Onset   • Heart disease Mother    • Stroke Mother    • Lung cancer Mother    • Aneurysm Father    • Diabetes Sister    • No Known Problems Brother    • No Known Problems Brother    • Diabetes type I Half-Sister    • Thyroid cancer Half-Sister    • Cancer Maternal Aunt    • Heart attack Sister    • Thyroid disease Sister    • No Known Problems Sister        Social History     Socioeconomic History  "  • Marital status: Legally    • Number of children: 2   Tobacco Use   • Smoking status: Never     Passive exposure: Never   • Smokeless tobacco: Never   Vaping Use   • Vaping Use: Never used   Substance and Sexual Activity   • Alcohol use: Yes     Alcohol/week: 1.0 standard drink     Types: 1 Glasses of wine per week     Comment: mixed drink once a week   • Drug use: Not Currently   • Sexual activity: Defer           Objective   Physical Exam  Vitals reviewed.   Constitutional:       General: She is not in acute distress.     Appearance: She is obese.   HENT:      Head: Normocephalic.      Mouth/Throat:      Mouth: Mucous membranes are moist.      Pharynx: Oropharynx is clear.   Eyes:      Extraocular Movements: Extraocular movements intact.      Pupils: Pupils are equal, round, and reactive to light.   Cardiovascular:      Rate and Rhythm: Normal rate.      Pulses: Normal pulses.   Pulmonary:      Effort: Pulmonary effort is normal.      Breath sounds: Normal breath sounds.   Abdominal:      General: Bowel sounds are normal.      Palpations: Abdomen is soft.      Tenderness: There is no right CVA tenderness or left CVA tenderness.   Musculoskeletal:      Cervical back: Neck supple. No rigidity.      Lumbar back: Positive right straight leg raise test and positive left straight leg raise test.        Back:    Skin:     General: Skin is warm and dry.   Neurological:      General: No focal deficit present.      Mental Status: She is alert and oriented to person, place, and time. Mental status is at baseline.      GCS: GCS eye subscore is 4. GCS verbal subscore is 5. GCS motor subscore is 6.         Procedures           ED Course  ED Course as of 05/24/23 1615   Wed May 24, 2023   0841 MRI states the patients pacemaker may have a lead \"going bad\" and they are unsure if they can complete the MRI due to this.  [BH]   0956 Due to patients pain pump and pacemaker, order has been changed to CT.  []   1106 " "Awaiting return call from neurosurgery []      ED Course User Index  [] Lavern Le APRN      /57   Pulse 70   Temp 98.3 °F (36.8 °C) (Oral)   Resp 18   Ht 154.9 cm (61\")   Wt 67.8 kg (149 lb 7.6 oz)   LMP  (LMP Unknown)   SpO2 92%   BMI 28.24 kg/m²   Labs Reviewed   COMPREHENSIVE METABOLIC PANEL - Abnormal; Notable for the following components:       Result Value    Glucose 115 (*)     Chloride 109 (*)     All other components within normal limits    Narrative:     GFR Normal >60  Chronic Kidney Disease <60  Kidney Failure <15     URINALYSIS W/ MICROSCOPIC IF INDICATED (NO CULTURE) - Abnormal; Notable for the following components:    Leuk Esterase, UA Moderate (2+) (*)     All other components within normal limits   CBC WITH AUTO DIFFERENTIAL - Abnormal; Notable for the following components:    Platelets 116 (*)     All other components within normal limits   URINALYSIS, MICROSCOPIC ONLY - Abnormal; Notable for the following components:    RBC, UA 0-2 (*)     WBC, UA 21-30 (*)     Squamous Epithelial Cells, UA 3-6 (*)     All other components within normal limits   POCT CHEM 8 - Abnormal; Notable for the following components:    Ionized Calcium 1.02 (*)     All other components within normal limits   URINE CULTURE   CBC AND DIFFERENTIAL    Narrative:     The following orders were created for panel order CBC & Differential.  Procedure                               Abnormality         Status                     ---------                               -----------         ------                     CBC Auto Differential[[id number]]        Abnormal            Final result                 Please view results for these tests on the individual orders.     Medications   ketorolac (TORADOL) injection 15 mg (15 mg Intravenous Given 5/24/23 5531)   tiZANidine (ZANAFLEX) tablet 4 mg (4 mg Oral Given 5/24/23 3436)     CT Lumbar Spine Without Contrast    Result Date: 5/24/2023  1. Acute mild compression " fracture of the anterior inferior L1 vertebral endplate without bony retropulsion or subluxation. 2. Chronic compression fractures of T12 and L5. 3. No high-grade lumbar canal or foraminal stenosis is seen. 4. Findings consistent with osseous metastatic disease within the lower thoracic spine and lumbar spine, new since the 7/20/2020 CT lumbar spine comparison. Electronically Signed: Kym Antony  5/24/2023 10:42 AM EDT  Workstation ID: [id number]                                         Medical Decision Making  As patient presents to the emergency room following a fall that occurred 2 days ago she has had low back pain, she has a known history of breast cancer with metastatic disease to her bones, currently has a morphine pain pump in her right lower back.  She has chronic numbness and tingling that is unchanged in her lower extremities, she also reports difficulty urination today which is new from her fall.  Unfortunately unable to complete MRI due to the patient's pacemaker and pain pump, CT of the lumbar spine was ordered noted and L1 compression fracture without subluxation or report propulsion, spoke with Kris with neurosurgery reviewed the imaging as well as patient's complaints she can be followed outpatient.  She was given a dose of Toradol and Zanaflex in the emergency room and she reports complete resolution of her pain, she has been ambulating independently without difficulty.  Urinalysis was positive for leukocytes with numerous white blood cells with patient's reported urinary symptoms prescription for cefdinir sent to her pharmacy with a urine culture pending at time of discharge.  Prescription for diclofenac was also sent to her preferred pharmacy as she currently has a pain pump in place infusing morphine.  Discussed ED findings with the patient and plan for outpatient follow-up, she gave verbal understanding.  She was alert oriented nontoxic in appearance at time of discharge with improved  symptoms, denied further questions or complaints.    Chart review: 4/7/2023 Bone Scan   1. New focus of abnormal uptake seen within the right ischial tuberosity. This would be consistent with metastatic disease given the lytic lesion in this area on prior CT of the abdomen and pelvis from 10/31/2022.  2. Stable abnormal uptake within the sternum, spine, and multiple right ribs.      This document is intended for medical expert use only. Reading of this document by patients and/or patient's family without participating medical staff guidance may result in misinterpretation and unintended morbidity. Any interpretation of such data is the responsibility of the patient and/or family member responsible for the patient in concert with their primary or specialist providers, not to be left for sources of online searches such as Mumart, Civo or similar queries. Relying on these approaches to knowledge may result in misinterpretation, misguided goals of care and even death should patients or family members try recommendations outside of the realm of professional medical care in a supervised inpatient environment.     Note: Please forgive punctuation, typographic/voice recognition errors, as portions of this document were created with Dragon Dictation, a voice recognition software.    Appropriate PPE worn during exam.    Fall, initial encounter: acute illness or injury  Midline low back pain without sciatica, unspecified chronicity: acute illness or injury  Urinary tract infection without hematuria, site unspecified: acute illness or injury  Amount and/or Complexity of Data Reviewed  Labs: ordered.  Radiology: ordered.      Risk  Prescription drug management.          Final diagnoses:   Fall, initial encounter   Midline low back pain without sciatica, unspecified chronicity   Urinary tract infection without hematuria, site unspecified       ED Disposition  ED Disposition     ED Disposition   Discharge    Condition   Stable     Comment   --             Chirag Robles,   [address]  [place] [address]  [phone]          Christ Umanzor IV, MD  [address]  [place] [address]  [phone]          Мария Nunez MD  [address] [facility]  [address]  [place] [address]  [phone]               Medication List      New Prescriptions    cefdinir 300 MG capsule  Commonly known as: OMNICEF  Take 1 capsule by mouth 2 (Two) Times a Day.     diclofenac 75 MG EC tablet  Commonly known as: VOLTAREN  Take 1 tablet by mouth 2 (Two) Times a Day.        Stop    azithromycin 250 MG tablet  Commonly known as: Zithromax Z-Marcelino           Where to Get Your Medications      These medications were sent to [facility] - [address] [place] [address]    Hours: Mon-Fri 7:00AM-7:00PM Phone: [phone]   · cefdinir 300 MG capsule  · diclofenac 75 MG EC tablet          Lavern Le, APRN  05/24/23 9056

## 2023-05-25 ENCOUNTER — PATIENT OUTREACH (OUTPATIENT)
Dept: CASE MANAGEMENT | Facility: OTHER | Age: 63
End: 2023-05-25
Payer: MEDICARE

## 2023-05-25 ENCOUNTER — TELEPHONE (OUTPATIENT)
Dept: PAIN MEDICINE | Facility: CLINIC | Age: 63
End: 2023-05-25
Payer: MEDICARE

## 2023-05-25 LAB — BACTERIA SPEC AEROBE CULT: NORMAL

## 2023-05-25 NOTE — TELEPHONE ENCOUNTER
Caller: JAMEEL   Relationship to Patient: SELF   Phone Number: [phone]  Reason for Call: ATTEMPTED TO WARM TRANSFER, PATIENT CALLING STATING THAT SHE FELL AND WENT TO THE ED ON 05/25/23 NOTES AND IMAGING IN CHART PATIENT IS ASKING TO HAVE HER PAIN PUMP TURNED UP

## 2023-05-25 NOTE — OUTREACH NOTE
AMBULATORY CASE MANAGEMENT NOTE    Name and Relationship of Patient/Support Person: Bladimir Monroeherman NOBLES - Self    Patient Outreach    Pt discharged from [facility] ED on 5/24/23, seen for back pain, UTI. RN-ACM outreach call made to pt. Explained role of RN-ACM and reason for call. Pt c/o back pain, states she has call into her pain management doctor, noted in chart. Reviewed ED AVS with pt. Education provided. Pt reports she is taking antibiotic rx as directed. She declines PCP follow up at this time, states she will call to schedule if needed. Reviewed SDOH. Pt denies any needs at this time. No questions per pt. Advised her to call RN-ACM or [facility] Nurse Line with any needs. Follow up outreach prn.     Adult Patient Profile  Questions/Answers    Flowsheet Row Most Recent Value   Primary Source of Support/Comfort spouse   People in Home spouse   Current Living Arrangements home        Send Education  Questions/Answers    Flowsheet Row Most Recent Value   Other Patient Education/Resources  24/7 [facility] Nurse Call Line   24/7 Nurse Call Line Education Method Verbal   Advanced Directives: --  [resources provided]        SDOH updated and reviewed with the patient during this program:  Financial Resource Strain: Low Risk    • Difficulty of Paying Living Expenses: Not very hard      Food Insecurity: No Food Insecurity   • Worried About Running Out of Food in the Last Year: Never true   • Ran Out of Food in the Last Year: Never true      Transportation Needs: No Transportation Needs   • Lack of Transportation (Medical): No   • Lack of Transportation (Non-Medical): No       Robel SALCEDO  Ambulatory Case Management    5/25/2023, 10:40 EDT

## 2023-05-26 ENCOUNTER — OFFICE VISIT (OUTPATIENT)
Dept: ONCOLOGY | Facility: CLINIC | Age: 63
End: 2023-05-26
Payer: MEDICARE

## 2023-05-26 ENCOUNTER — LAB (OUTPATIENT)
Dept: LAB | Facility: HOSPITAL | Age: 63
End: 2023-05-26

## 2023-05-26 ENCOUNTER — OFFICE VISIT (OUTPATIENT)
Dept: PAIN MEDICINE | Facility: CLINIC | Age: 63
End: 2023-05-26
Payer: MEDICARE

## 2023-05-26 VITALS
RESPIRATION RATE: 18 BRPM | DIASTOLIC BLOOD PRESSURE: 96 MMHG | SYSTOLIC BLOOD PRESSURE: 192 MMHG | WEIGHT: 149 LBS | BODY MASS INDEX: 28.13 KG/M2 | HEIGHT: 61 IN | TEMPERATURE: 98 F | OXYGEN SATURATION: 93 % | HEART RATE: 74 BPM

## 2023-05-26 VITALS
HEART RATE: 77 BPM | SYSTOLIC BLOOD PRESSURE: 186 MMHG | RESPIRATION RATE: 16 BRPM | OXYGEN SATURATION: 98 % | DIASTOLIC BLOOD PRESSURE: 104 MMHG

## 2023-05-26 DIAGNOSIS — G89.3 CANCER ASSOCIATED PAIN: Primary | ICD-10-CM

## 2023-05-26 DIAGNOSIS — S32.000A CLOSED COMPRESSION FRACTURE OF LUMBOSACRAL SPINE, INITIAL ENCOUNTER: ICD-10-CM

## 2023-05-26 DIAGNOSIS — C50.919 MALIGNANT NEOPLASM OF FEMALE BREAST, UNSPECIFIED ESTROGEN RECEPTOR STATUS, UNSPECIFIED LATERALITY, UNSPECIFIED SITE OF BREAST: ICD-10-CM

## 2023-05-26 DIAGNOSIS — C79.51 MALIGNANT NEOPLASM METASTATIC TO BONE: Primary | ICD-10-CM

## 2023-05-26 PROCEDURE — G0463 HOSPITAL OUTPT CLINIC VISIT: HCPCS | Performed by: STUDENT IN AN ORGANIZED HEALTH CARE EDUCATION/TRAINING PROGRAM

## 2023-05-26 NOTE — PROGRESS NOTES
CHIEF COMPLAINT  Chief Complaint   Patient presents with   • Cancer Pain     Pain pump increase  CC  Cancer associated pain Treated w/Pain pump       Primary Care  Chirag Robles, DO    Subjective   Gladys Garrison is a 62 y.o. female  who presents for cancer-related pain.  She has a significant history of breast cancer which is unfortunately metastatic.  She states she has lesions on her ribs as well as in her low back.  She describes pain in her low back which radiates in her legs as well as pain in her chest and side from reported rib fractures.  She is very hesitant to take any narcotic pain medication because she does not want to become dependent on medication.  She also has significant concerns regarding escalating pain requirements and inability to meet the requirements that she starts on narcotic pain medication.  She states in the past, she had excellent results with steroid injections for her shoulder knee pain.  She was referred by her oncologist for further management and nonnarcotic options.    Pain  Associated symptoms include arthralgias, chest pain, myalgias and neck pain.        Location: Neck, back, ribs  Onset: Years ago  Duration: Gradually worsening  Timing: Throughout the day  Quality: Stabbing, aching  Severity: Today: 8       Last Week: 8       Worst: 9  Modifying Factors: The pain is fairly constant without any significant exacerbating or relieving factors    Physical Therapy: no    Interval Update 05/26/2023: She presents today for worsening pain.  She reports that she fell and exacerbated her pain.  Requesting intrathecal pain pump increase.  Otherwise, no significant changes.  Does report benefit with muscle relaxers.    The following portions of the patient's history were reviewed and updated as appropriate: allergies, current medications, past family history, past medical history, past social history, past surgical history and problem list.      Current Outpatient Medications:  "  •  acetaminophen (TYLENOL) 650 MG 8 hr tablet, Take 1 tablet by mouth As Needed for Mild Pain. TAKES BETWEEN PAIN MEDS, Disp: , Rfl:   •  aspirin 81 MG chewable tablet, Chew 1 tablet Daily., Disp: 30 tablet, Rfl: 1  •  Blood Glucose Monitoring Suppl (Accu-Chek Araceli) device, Use as instructed   To test blood sugar bid, Disp: 1 each, Rfl: 0  •  Calcium Carbonate-Vit D-Min (Calcium 600+D Plus Minerals) 600-400 MG-UNIT chewable tablet, Chew 600 mg 2 (Two) Times a Day., Disp: 60 each, Rfl: 6  •  cefdinir (OMNICEF) 300 MG capsule, Take 1 capsule by mouth 2 (Two) Times a Day., Disp: 14 capsule, Rfl: 0  •  diclofenac (VOLTAREN) 75 MG EC tablet, Take 1 tablet by mouth 2 (Two) Times a Day., Disp: 10 tablet, Rfl: 0  •  fluticasone (FLONASE) 50 MCG/ACT nasal spray, 2 sprays into the nostril(s) as directed by provider Daily., Disp: 16 g, Rfl: 1  •  glucose blood (Accu-Chek Araceli Plus) test strip, Use as instructed   To test bid, Disp: 200 each, Rfl: 3  •  ibuprofen (ADVIL,MOTRIN) 400 MG tablet, Take 1 tablet by mouth As Needed for Mild Pain. IN BETWEEN PAIN  MEDS, Disp: , Rfl:   •  insulin NPH-insulin regular (humuLIN 70/30,novoLIN 70/30) (70-30) 100 UNIT/ML injection, Inject 40 Units under the skin into the appropriate area as directed Every Morning., Disp: , Rfl:   •  insulin NPH-insulin regular (humuLIN 70/30,novoLIN 70/30) (70-30) 100 UNIT/ML injection, Inject 30 Units under the skin into the appropriate area as directed Every Evening., Disp: , Rfl:   •  Insulin Pen Needle (B-D UF III MINI PEN NEEDLES) 31G X 5 MM misc, Use to inject insulin twice daily   DX: E11.9, Disp: 100 each, Rfl: 0  •  Insulin Syringe-Needle U-100 28G X 1/2\" 1 ML misc, 1 each 2 (Two) Times a Day., Disp: 100 each, Rfl: 6  •  Lancets (accu-chek soft touch) lancets, Test bid, Disp: 200 each, Rfl: 3  •  lisinopril (PRINIVIL,ZESTRIL) 20 MG tablet, Take 1 tablet by mouth Daily. Takes 4-5 times per week., Disp: , Rfl:   •  ondansetron ODT (ZOFRAN-ODT) 4 MG " disintegrating tablet, DISSOLVE 1 TABLET IN MOUTH EVERY 8 HOURS AS NEEDED FOR NAUSEA FOR UP TO 2 DAYS, Disp: , Rfl:   •  rosuvastatin (Crestor) 20 MG tablet, Take 1 tablet by mouth Every Night., Disp: 90 tablet, Rfl: 0    Review of Systems   Cardiovascular: Positive for chest pain.   Genitourinary: Positive for flank pain.   Musculoskeletal: Positive for arthralgias, back pain, gait problem, myalgias and neck pain.       Vitals:    05/26/23 1045   BP: (!) 186/104   Pulse: 77   Resp: 16   SpO2: 98%   PainSc:   8       Objective   Physical Exam  Vitals and nursing note reviewed.   Constitutional:       General: She is not in acute distress.     Appearance: Normal appearance. She is obese.   Musculoskeletal:         General: Normal range of motion.      Comments: Pump incisions clean, dry, intact.  Completely healed   Neurological:      General: No focal deficit present.      Mental Status: She is alert.      Sensory: No sensory deficit.      Motor: No weakness.           Assessment & Plan   Problems Addressed this Visit        Other    Malignant neoplasm of female breast    Cancer associated pain - Primary   Diagnoses       Codes Comments    Cancer associated pain    -  Primary ICD-10-CM: G89.3  ICD-9-CM: 338.3     Malignant neoplasm of female breast, unspecified estrogen receptor status, unspecified laterality, unspecified site of breast     ICD-10-CM: C50.919  ICD-9-CM: 174.9           Plan:  1. Increase her daily morphine dosage to 0.45 mg daily  2. She is scheduled for refill on 5/31/2023  3. We will arrange for [facility] home contact to help with intrathecal pain pump refill  4. Will plan to refill with morphine 2 mg/mL to help extend her refill dates  --- Follow-up 2 months           INSPECT REPORT    As part of the patient's treatment plan, I may be prescribing controlled substances. The patient has been made aware of appropriate use of such medications, including potential risk of somnolence, limited ability to  drive and/or work safely, and the potential for dependence or overdose. It has also bee made clear that these medications are for use by this patient only, without concomitant use of alcohol or other substances unless prescribed.     Patient has completed prescribing agreement detailing terms of continued prescribing of controlled substances, including monitoring JULIA reports, urine drug screening, and pill counts if necessary. The patient is aware that inappropriate use will results in cessation of prescribing such medications.    INSPECT report has been reviewed and scanned into the patient's chart.    As the clinician, I personally reviewed the INSPECT from 5/24/2023.    History and physical exam exhibit continued safe and appropriate use of controlled substances.      EMR Dragon/Transcription disclaimer:   Much of this encounter note is an electronic transcription/translation of spoken language to printed text. The electronic translation of spoken language may permit erroneous, or at times, nonsensical words or phrases to be inadvertently transcribed; Although I have reviewed the note for such errors, some may still exist.

## 2023-05-30 ENCOUNTER — TELEPHONE (OUTPATIENT)
Dept: PAIN MEDICINE | Facility: CLINIC | Age: 63
End: 2023-05-30

## 2023-05-30 NOTE — TELEPHONE ENCOUNTER
Hub staff attempted to follow warm transfer process and was unsuccessful    Caller: LUCIEN SARGENT    Relationship to patient: SELF    Best call back number: [phone]    Patient is needing: CALLING ABOUT HER PAIN PUMP REFILL; SAID THAT DR JERONIMO WAS SUPPOSED TO FIND SOMEONE WHO COULD DO THE REFILL WHILE HE WAS OUT ON VACATION, BUT SHE HAS NOT HEARD ANYTHING BACK. PT IS WORRIED ABOUT RUNNING OUT AND BEING IN PAIN AND WOULD LIKE TO KNOW SOMETHING ASAP. SHE IS NOT OFF WORK UNTIL 5:30PM, SO IF SHE CAN BE WORKED IN ON 5/31 OR 6/1, BECAUSE SHE WILL RUN OUT ON 6/1. SHE SAID SHE IS ALREADY HAVING TROUBLE WITH PAIN AND IS WORRIED ABOUT RUNNING OUT. PLEASE CALL HER BACK AT THE NUMBER ABOVE AND LEAVE HER A VOICEMAIL.

## 2023-05-31 NOTE — TELEPHONE ENCOUNTER
I've reached out to dr mendoza and krystle to see where we were in the process.  All paperwork and orders were sent on Friday.  The company reached out to our office yesterday and paperwork was resent.  Spoke to dr mendoza this am and this is the number of the rep he gave me [phone].  Reach out and see what you can figure out.  The hospital pharmacy can mix morphine if needed and suresh could fill it??

## 2023-05-31 NOTE — TELEPHONE ENCOUNTER
UNABLE TO WT    Caller:  LUCIEN SARGENT    Relationship to patient: PATIENT     Best call back number: [phone]    Patient is needing:PATIENT CALLING ABOUT STATUS OF PAIN PUMP REFILL INDICATING DR JERONIMO SAID HE WOULD FIND SOMEONE TO REFILL.  PATIENT STATES DR JERONIMO IS ON VACATION AND SHE DID NOT HEAR BACK FROM HIM.  PATIENT WILL RUN OUT ON 06/01/23.  PLEASE CALL PATIENT TO DISCUSS.

## 2023-05-31 NOTE — TELEPHONE ENCOUNTER
Spoke with [employer] the pump refill rep, they have all the info from Dr. Hunter they were only waiting on a current insurance card. Insurance cards on file were sent to [employer].

## 2023-05-31 NOTE — TELEPHONE ENCOUNTER
Spoke with patient to let her know [employer] would be contacting her.Confirmed with [employer] she would call patient today with update.

## 2023-06-02 ENCOUNTER — APPOINTMENT (OUTPATIENT)
Dept: GENERAL RADIOLOGY | Facility: HOSPITAL | Age: 63
End: 2023-06-02
Payer: MEDICARE

## 2023-06-02 ENCOUNTER — HOSPITAL ENCOUNTER (EMERGENCY)
Facility: HOSPITAL | Age: 63
Discharge: HOME | End: 2023-06-02
Attending: EMERGENCY MEDICINE
Payer: MEDICARE

## 2023-06-02 VITALS
WEIGHT: 149 LBS | OXYGEN SATURATION: 98 % | BODY MASS INDEX: 28.13 KG/M2 | SYSTOLIC BLOOD PRESSURE: 168 MMHG | HEART RATE: 70 BPM | DIASTOLIC BLOOD PRESSURE: 87 MMHG | HEIGHT: 61 IN | RESPIRATION RATE: 24 BRPM | TEMPERATURE: 98.4 F

## 2023-06-02 DIAGNOSIS — S39.012A STRAIN OF LUMBAR REGION, INITIAL ENCOUNTER: Primary | ICD-10-CM

## 2023-06-02 PROCEDURE — 99283 EMERGENCY DEPT VISIT LOW MDM: CPT

## 2023-06-02 PROCEDURE — 96372 THER/PROPH/DIAG INJ SC/IM: CPT

## 2023-06-02 PROCEDURE — 72110 X-RAY EXAM L-2 SPINE 4/>VWS: CPT

## 2023-06-02 PROCEDURE — 25010000002 KETOROLAC TROMETHAMINE PER 15 MG

## 2023-06-02 RX ORDER — LIDOCAINE 50 MG/G
1 PATCH TOPICAL ONCE
Status: DISCONTINUED | OUTPATIENT
Start: 2023-06-02 | End: 2023-06-02 | Stop reason: HOSPADM

## 2023-06-02 RX ORDER — KETOROLAC TROMETHAMINE 30 MG/ML
30 INJECTION, SOLUTION INTRAMUSCULAR; INTRAVENOUS ONCE
Status: COMPLETED | OUTPATIENT
Start: 2023-06-02 | End: 2023-06-02

## 2023-06-02 RX ORDER — TIZANIDINE 4 MG/1
4 TABLET ORAL ONCE
Status: COMPLETED | OUTPATIENT
Start: 2023-06-02 | End: 2023-06-02

## 2023-06-02 RX ADMIN — TIZANIDINE 4 MG: 4 TABLET ORAL at 14:16

## 2023-06-02 RX ADMIN — LIDOCAINE 1 PATCH: 50 PATCH CUTANEOUS at 14:16

## 2023-06-02 RX ADMIN — KETOROLAC TROMETHAMINE 30 MG: 30 INJECTION, SOLUTION INTRAMUSCULAR at 14:15

## 2023-06-02 NOTE — ED PROVIDER NOTES
"Subjective   History of Present Illness  Chief Complaint: Back pain      HPI: Patient is a 62-year-old female who presents to the emergency room by EMS from her place of employment, states that she attempted to lift a 24 pack of water when she began having excruciating low back pain.  She states that she was seen in the emergency room last week and diagnosed with a compression fracture she has a pain pump in place that was just refilled by pain management.  She is having no saddle anesthesia no lower extremity numbness or tingling, no urinary or bowel retention or incontinence.  Patient states \"after they called the ambulance my boss thought I was faking it so I went ahead and road in\"    PCP: Travis         Review of Systems   Gastrointestinal: Positive for nausea.   Musculoskeletal: Positive for back pain.       Past Medical History:   Diagnosis Date   • Allergic    • Bone cancer     METASTATIC BONE   • Bradycardia     SECONDARY TO ABLATION   • Breast cancer 2017    mets to lymph nodes; did not do radiation   • Cancer of unknown origin    • Compression fx, thoracic spine, open, initial encounter    • Coronary artery disease    • COVID-19 09/01/2021   • Diabetes mellitus    • Heart disease, unspecified    • Hepatitis C     RESOLVED WITH MEDICATION   • Hyperlipidemia    • Hypertension    • Obesity (BMI 30-39.9) 02/05/2021   • Rib fracture     Per patient   • Sleep apnea     no machine   • Tachycardia     ATRIAL   • Type 2 diabetes mellitus 11/2017       Allergies   Allergen Reactions   • Promethazine Other (See Comments)     Hyperactive mean   • Tape Other (See Comments)     .blisters         Past Surgical History:   Procedure Laterality Date   • BACK SURGERY      neck X 2   • BREAST RECONSTRUCTION Bilateral    • CARDIAC ABLATION       atrial tachycardia x 5 ablations    • CARDIAC CATHETERIZATION     • CARDIAC SURGERY      stent placed in aorta   • CARDIAC SURGERY      6 surgeries as baby    • CERVICAL FUSION " ANTERIOR WITH ARTIFICIAL DISCECTOMY IMPLANTATION N/A 2020    Procedure: C4 VERTEBRECTOMY AND ANTERIOR CERIVCAL DISCECTOMY WITH FUSION OF CERVICAL THREE THROUGH FIVE WITH REMOVAL OF HARDWARE C5-C6;  Surgeon: Mark Kaba MD;  Location: [facility] MAIN OR;  Service: Neurosurgery;  Laterality: N/A;   •  SECTION      x2   • COLONOSCOPY N/A 10/23/2020    Procedure: COLONOSCOPY WITH POLYPECTOMY X6;  Surgeon: Stanley Bourne MD;  Location: [facility] ENDOSCOPY;  Service: Gastroenterology;  Laterality: N/A;  POLYPS, INTERNAL HEMORRHOIDS   • ENDOMETRIAL ABLATION      REMOVAL SCAR TISSUE UTERINE   • ENDOSCOPY N/A 10/23/2020    Procedure: ESOPHAGOGASTRODUODENOSCOPY WITH BIOPSY X 1 AREA;  Surgeon: Stanley Bourne MD;  Location: [facility] ENDOSCOPY;  Service: Gastroenterology;  Laterality: N/A;  GASTRITIS, ESOPHAGITIS, HIATAL HERNIA   • KNEE SURGERY     • MASTECTOMY Bilateral    • NECK SURGERY     • PACEMAKER IMPLANTATION     • PAIN PUMP INSERTION/REVISION N/A 2022    Procedure: PAIN PUMP INSERTION AND INTRATHECAL CATHETER PLACEMENT;  Surgeon: Chuck Hunter MD;  Location: [facility] MAIN OR;  Service: Pain Management;  Laterality: N/A;       Family History   Problem Relation Age of Onset   • Heart disease Mother    • Stroke Mother    • Lung cancer Mother    • Aneurysm Father    • Diabetes Sister    • No Known Problems Brother    • No Known Problems Brother    • Diabetes type I Half-Sister    • Thyroid cancer Half-Sister    • Cancer Maternal Aunt    • Heart attack Sister    • Thyroid disease Sister    • No Known Problems Sister        Social History     Socioeconomic History   • Marital status: Legally    • Number of children: 2   Tobacco Use   • Smoking status: Never     Passive exposure: Never   • Smokeless tobacco: Never   Vaping Use   • Vaping Use: Never used   Substance and Sexual Activity   • Alcohol use: Yes     Alcohol/week: 1.0 standard drink     Types: 1 Glasses of wine  "per week     Comment: mixed drink once a week   • Drug use: Not Currently   • Sexual activity: Defer           Objective   Physical Exam  Vitals reviewed.   Constitutional:       Appearance: She is obese. She is not toxic-appearing.   HENT:      Head: Normocephalic.      Mouth/Throat:      Mouth: Mucous membranes are moist.      Pharynx: Oropharynx is clear.   Eyes:      Extraocular Movements: Extraocular movements intact.      Pupils: Pupils are equal, round, and reactive to light.   Cardiovascular:      Rate and Rhythm: Normal rate.      Pulses: Normal pulses.   Pulmonary:      Effort: Pulmonary effort is normal.      Breath sounds: Normal breath sounds.   Musculoskeletal:      Cervical back: Normal and normal range of motion. No rigidity.      Thoracic back: Normal.      Lumbar back: Bony tenderness present. Negative left straight leg raise test.      Comments: Midline tenderness to the lumbar spine, no bony step-offs or crepitus no overlying erythema or ecchymosis.     Bilateral lower extremity strength 5 out of 5    Pain pump noted in patient right low back.   Skin:     General: Skin is warm and dry.      Capillary Refill: Capillary refill takes less than 2 seconds.   Neurological:      General: No focal deficit present.      Mental Status: She is alert and oriented to person, place, and time. Mental status is at baseline.         Procedures           ED Course      /87 (BP Location: Right arm, Patient Position: Lying)   Pulse 70   Temp 98.4 °F (36.9 °C) (Oral)   Resp 24   Ht 154.9 cm (61\")   Wt 67.6 kg (149 lb)   LMP  (LMP Unknown)   SpO2 98%   BMI 28.15 kg/m²   Labs Reviewed - No data to display  Medications   tiZANidine (ZANAFLEX) tablet 4 mg (4 mg Oral Given 6/2/23 1416)   ketorolac (TORADOL) injection 30 mg (30 mg Intramuscular Given 6/2/23 1415)     XR Spine Lumbar Complete 4+VW    Result Date: 6/2/2023  Impression: 1. New compression fracture at L1 which is felt to be acute/subacute as " there is suggestion of healing callus. 2. No interval change in the old T12 compression fracture. 3. Mild facet arthritis in the lower lumbar spine. Electronically Signed: Sterling Emani  6/2/2023 2:40 PM EDT  Workstation ID: [id number]                                         Medical Decision Making  With review of the patient's chart I saw her on 5/24/2023 related to low back pain, a CT was completed and noted an L1 compression fracture, I spoke with neurosurgery at that time and they advised follow-up outpatient.  She presented to the emergency room again with low back pain states it was exacerbated after she was attempting to lift a case of bae.  She has no neurological symptoms she has a pain pump in place she has known cancer with mets to her spine.  An x-ray was obtained noted acute/subacute L1 fracture which was seen on the CT completed last week.  She was given a dose of Zanaflex, Lidoderm patch was placed and she was given a dose of Toradol as well, she has a morphine pump in place.  She was able to ambulate but certain movements do exacerbate discomfort.  She has been given the information for follow up with neurosurgery, she was also given information today for physical therapy.  Patient gave verbal understanding of ED findings and plan for outpatient follow up.  In the middle of my discharge instructions patient began trying to contact , I reiterated discharge instructions.  Patient gave verbal understanding, she was alert, oriented nontoxic in appearance at time of discharge.       Chart review:  CT Lumbar Spine 5/24/2023  1. Acute mild compression fracture of the anterior inferior L1 vertebral endplate without bony retropulsion or subluxation.  2. Chronic compression fractures of T12 and L5.  3. No high-grade lumbar canal or foraminal stenosis is seen.  4. Findings consistent with osseous metastatic disease within the lower thoracic spine and lumbar spine, new since the 7/20/2020 CT lumbar spine  comparison.      This document is intended for medical expert use only. Reading of this document by patients and/or patient's family without participating medical staff guidance may result in misinterpretation and unintended morbidity. Any interpretation of such data is the responsibility of the patient and/or family member responsible for the patient in concert with their primary or specialist providers, not to be left for sources of online searches such as [employer], [employer] or similar queries. Relying on these approaches to knowledge may result in misinterpretation, misguided goals of care and even death should patients or family members try recommendations outside of the realm of professional medical care in a supervised inpatient environment.     Note: Please forgive punctuation, typographic/voice recognition errors, as portions of this document were created with Dragon Dictation, a voice recognition software.    Appropriate PPE worn during exam.    Strain of lumbar region, initial encounter: complicated acute illness or injury  Amount and/or Complexity of Data Reviewed  Radiology: ordered.      Risk  Prescription drug management.          Final diagnoses:   Strain of lumbar region, initial encounter       ED Disposition  ED Disposition     ED Disposition   Discharge    Condition   Stable    Comment   --             [facility] PHYSICAL THERAPY  [address] [address]  [phone]  Call in 1 week      Chirag Robles DO  [address]  [place] [id number] [phone]               Medication List      No changes were made to your prescriptions during this visit.          Lavern Le, APRN  06/02/23 1929

## 2023-06-02 NOTE — DISCHARGE INSTRUCTIONS
Follow-up with spine specialist, information provided at your most recent ER visit on 5/24/2023    Phone number for physical therapy provided during this visit follow-up as needed.    Follow up with Primary Care    Consider using heat or ice to the area 20 minutes at a time several times a day.  Low back exercises daily  Over the counter lidocaine patches, please ensure you wash the skin   Take medications as prescribed.  Do not drive or drink alcohol while taking pain medication.  Avoid heavy lifting, bending, twisting.    Return to the Emergency Room for new or worsening symptoms

## 2023-06-02 NOTE — ED NOTES
Chief Complaint   Patient presents with    Back Pain     Picked up a 12 pack at work and strained her back.  Lumbar pain and difficulty ambulating d/t pain since.  Morphine pump present for cancer.

## 2023-06-15 ENCOUNTER — TELEPHONE (OUTPATIENT)
Dept: NEUROSURGERY | Facility: CLINIC | Age: 63
End: 2023-06-15
Payer: MEDICARE

## 2023-06-15 DIAGNOSIS — C79.51 MALIGNANT NEOPLASM METASTATIC TO BONE: Primary | ICD-10-CM

## 2023-06-15 RX ORDER — LAMOTRIGINE 25 MG/1
500 TABLET ORAL ONCE
OUTPATIENT
Start: 2023-06-16

## 2023-06-15 NOTE — TELEPHONE ENCOUNTER
"Left message for patient to call the office regarding her appointment Tomorrow.  \"We need to Reschedule due to Dr. Ruff will be in Surgery on Friday 6/16/23.      \" [facility] OK to Reschedule\"  "

## 2023-06-16 ENCOUNTER — HOSPITAL ENCOUNTER (OUTPATIENT)
Dept: ONCOLOGY | Facility: HOSPITAL | Age: 63
Discharge: HOME | End: 2023-06-16
Payer: MEDICARE

## 2023-06-17 ENCOUNTER — HOSPITAL ENCOUNTER (EMERGENCY)
Facility: HOSPITAL | Age: 63
Discharge: HOME | End: 2023-06-17
Attending: EMERGENCY MEDICINE
Payer: MEDICARE

## 2023-06-17 VITALS
HEIGHT: 61 IN | WEIGHT: 150 LBS | SYSTOLIC BLOOD PRESSURE: 163 MMHG | RESPIRATION RATE: 16 BRPM | OXYGEN SATURATION: 100 % | TEMPERATURE: 97.8 F | DIASTOLIC BLOOD PRESSURE: 75 MMHG | HEART RATE: 70 BPM | BODY MASS INDEX: 28.32 KG/M2

## 2023-06-17 DIAGNOSIS — E16.2 HYPOGLYCEMIA: Primary | ICD-10-CM

## 2023-06-17 LAB
GLUCOSE BLDC GLUCOMTR-MCNC: 105 MG/DL (ref 70–105)
GLUCOSE BLDC GLUCOMTR-MCNC: 113 MG/DL (ref 70–105)
GLUCOSE BLDC GLUCOMTR-MCNC: 86 MG/DL (ref 70–105)

## 2023-06-17 PROCEDURE — 82948 REAGENT STRIP/BLOOD GLUCOSE: CPT

## 2023-06-17 NOTE — Clinical Note
[facility] EMERGENCY DEPARTMENT  [address] [place] [address]  Phone: [phone]    Gladys Garrison was seen and treated in our emergency department on 6/17/2023.  She may return to work on 06/18/2023.         Thank you for choosing [facility].    Christ Young MD       Information: Selecting Yes will display possible errors in your note based on the variables you have selected. This validation is only offered as a suggestion for you. PLEASE NOTE THAT THE VALIDATION TEXT WILL BE REMOVED WHEN YOU FINALIZE YOUR NOTE. IF YOU WANT TO FAX A PRELIMINARY NOTE YOU WILL NEED TO TOGGLE THIS TO 'NO' IF YOU DO NOT WANT IT IN YOUR FAXED NOTE.

## 2023-06-17 NOTE — ED PROVIDER NOTES
Subjective   History of Present Illness  62-year-old female with insulin and diabetes transferred from home after hypoglycemic episode.  Blood sugar was reportedly in the 20s.  Patient was given glucagon and is 118 at bedside.  Patient observed in the ED, ate crackers, drink OJ and blood sugar has stabilized without any recurrence of hypoglycemia.  Patient states she did not eat very much yesterday secondary to being busy with her grandchildren.  Patient offered further observation, declines.  Patient instructed to hold her insulin until she has sustained blood sugars over 200.    Review of Systems   Constitutional:  Positive for appetite change.   Endocrine:        Hypoglycemia     Past Medical History:   Diagnosis Date    Allergic     Bone cancer     METASTATIC BONE    Bradycardia     SECONDARY TO ABLATION    Breast cancer 2017    mets to lymph nodes; did not do radiation    Cancer of unknown origin     Compression fx, thoracic spine, open, initial encounter     Coronary artery disease     COVID-19 09/01/2021    Diabetes mellitus     Heart disease, unspecified     Hepatitis C     RESOLVED WITH MEDICATION    Hyperlipidemia     Hypertension     Obesity (BMI 30-39.9) 02/05/2021    Rib fracture     Per patient    Sleep apnea     no machine    Tachycardia     ATRIAL    Type 2 diabetes mellitus 11/2017       Allergies   Allergen Reactions    Promethazine Other (See Comments)     Hyperactive mean    Tape Other (See Comments)     .blisters         Past Surgical History:   Procedure Laterality Date    BACK SURGERY      neck X 2    BREAST RECONSTRUCTION Bilateral     CARDIAC ABLATION       atrial tachycardia x 5 ablations     CARDIAC CATHETERIZATION      CARDIAC SURGERY      stent placed in aorta    CARDIAC SURGERY      6 surgeries as baby     CERVICAL FUSION ANTERIOR WITH ARTIFICIAL DISCECTOMY IMPLANTATION N/A 09/22/2020    Procedure: C4 VERTEBRECTOMY AND ANTERIOR CERIVCAL DISCECTOMY WITH FUSION OF CERVICAL THREE THROUGH  FIVE WITH REMOVAL OF HARDWARE C5-C6;  Surgeon: Mark Kaba MD;  Location: [facility] MAIN OR;  Service: Neurosurgery;  Laterality: N/A;     SECTION      x2    COLONOSCOPY N/A 10/23/2020    Procedure: COLONOSCOPY WITH POLYPECTOMY X6;  Surgeon: Stanley Bourne MD;  Location: [facility] ENDOSCOPY;  Service: Gastroenterology;  Laterality: N/A;  POLYPS, INTERNAL HEMORRHOIDS    ENDOMETRIAL ABLATION      REMOVAL SCAR TISSUE UTERINE    ENDOSCOPY N/A 10/23/2020    Procedure: ESOPHAGOGASTRODUODENOSCOPY WITH BIOPSY X 1 AREA;  Surgeon: Stanley Bourne MD;  Location: [facility] ENDOSCOPY;  Service: Gastroenterology;  Laterality: N/A;  GASTRITIS, ESOPHAGITIS, HIATAL HERNIA    KNEE SURGERY      MASTECTOMY Bilateral     NECK SURGERY      PACEMAKER IMPLANTATION      PAIN PUMP INSERTION/REVISION N/A 2022    Procedure: PAIN PUMP INSERTION AND INTRATHECAL CATHETER PLACEMENT;  Surgeon: Chuck Hunter MD;  Location: [facility] MAIN OR;  Service: Pain Management;  Laterality: N/A;       Family History   Problem Relation Age of Onset    Heart disease Mother     Stroke Mother     Lung cancer Mother     Aneurysm Father     Diabetes Sister     No Known Problems Brother     No Known Problems Brother     Diabetes type I Half-Sister     Thyroid cancer Half-Sister     Cancer Maternal Aunt     Heart attack Sister     Thyroid disease Sister     No Known Problems Sister        Social History     Socioeconomic History    Marital status: Legally     Number of children: 2   Tobacco Use    Smoking status: Never     Passive exposure: Never    Smokeless tobacco: Never   Vaping Use    Vaping Use: Never used   Substance and Sexual Activity    Alcohol use: Yes     Alcohol/week: 1.0 standard drink     Types: 1 Glasses of wine per week     Comment: mixed drink once a week    Drug use: Not Currently    Sexual activity: Defer           Objective   Physical Exam  Constitutional:       Appearance: Normal appearance.    HENT:      Head: Normocephalic and atraumatic.   Cardiovascular:      Rate and Rhythm: Normal rate and regular rhythm.   Pulmonary:      Effort: Pulmonary effort is normal.      Breath sounds: Normal breath sounds.   Skin:     General: Skin is warm and dry.      Capillary Refill: Capillary refill takes less than 2 seconds.   Neurological:      Mental Status: She is alert and oriented to person, place, and time.       Procedures           ED Course                                           Medical Decision Making  Problems Addressed:  Hypoglycemia: complicated acute illness or injury    Amount and/or Complexity of Data Reviewed  Labs: ordered.        Final diagnoses:   Hypoglycemia       ED Disposition  ED Disposition       ED Disposition   Discharge    Condition   Stable    Comment   --               Cihrag Robles DO  [address]  [place] [address]  [phone]    Schedule an appointment as soon as possible for a visit            Medication List      No changes were made to your prescriptions during this visit.            Christ Young MD  06/17/23 0613

## 2023-06-17 NOTE — Clinical Note
[facility] EMERGENCY DEPARTMENT  [address] [place] [address]  Phone: [phone]    Gladys Garrison was seen and treated in our emergency department on 6/17/2023.  She may return to work on 06/18/2023.         Thank you for choosing [facility].    Christ Young MD

## 2023-06-19 ENCOUNTER — OFFICE VISIT (OUTPATIENT)
Dept: FAMILY MEDICINE CLINIC | Facility: CLINIC | Age: 63
End: 2023-06-19
Payer: MEDICARE

## 2023-06-19 ENCOUNTER — PATIENT OUTREACH (OUTPATIENT)
Dept: CASE MANAGEMENT | Facility: OTHER | Age: 63
End: 2023-06-19
Payer: MEDICARE

## 2023-06-19 VITALS
WEIGHT: 152.6 LBS | OXYGEN SATURATION: 99 % | DIASTOLIC BLOOD PRESSURE: 80 MMHG | BODY MASS INDEX: 28.81 KG/M2 | HEIGHT: 61 IN | SYSTOLIC BLOOD PRESSURE: 160 MMHG | HEART RATE: 75 BPM

## 2023-06-19 DIAGNOSIS — Z79.4 TYPE 2 DIABETES MELLITUS WITH HYPERGLYCEMIA, WITH LONG-TERM CURRENT USE OF INSULIN: Primary | ICD-10-CM

## 2023-06-19 DIAGNOSIS — M54.50 CHRONIC BILATERAL LOW BACK PAIN WITHOUT SCIATICA: ICD-10-CM

## 2023-06-19 DIAGNOSIS — I10 PRIMARY HYPERTENSION: ICD-10-CM

## 2023-06-19 DIAGNOSIS — E11.649 HYPOGLYCEMIC EPISODE IN PATIENT WITH DIABETES MELLITUS: ICD-10-CM

## 2023-06-19 DIAGNOSIS — E11.65 TYPE 2 DIABETES MELLITUS WITH HYPERGLYCEMIA, WITH LONG-TERM CURRENT USE OF INSULIN: Primary | ICD-10-CM

## 2023-06-19 DIAGNOSIS — G89.29 CHRONIC BILATERAL LOW BACK PAIN WITHOUT SCIATICA: ICD-10-CM

## 2023-06-19 PROCEDURE — 99214 OFFICE O/P EST MOD 30 MIN: CPT | Performed by: STUDENT IN AN ORGANIZED HEALTH CARE EDUCATION/TRAINING PROGRAM

## 2023-06-19 PROCEDURE — 3077F SYST BP >= 140 MM HG: CPT | Performed by: STUDENT IN AN ORGANIZED HEALTH CARE EDUCATION/TRAINING PROGRAM

## 2023-06-19 PROCEDURE — 3079F DIAST BP 80-89 MM HG: CPT | Performed by: STUDENT IN AN ORGANIZED HEALTH CARE EDUCATION/TRAINING PROGRAM

## 2023-06-19 RX ORDER — FLASH GLUCOSE SENSOR
1 KIT MISCELLANEOUS DAILY
Qty: 6 EACH | Refills: 3 | Status: SHIPPED | OUTPATIENT
Start: 2023-06-19

## 2023-06-19 RX ORDER — FLASH GLUCOSE SCANNING READER
1 EACH MISCELLANEOUS DAILY
Qty: 1 EACH | Refills: 0 | Status: SHIPPED | OUTPATIENT
Start: 2023-06-19

## 2023-06-19 NOTE — PROGRESS NOTES
"Chief Complaint  Chief Complaint   Patient presents with    Diabetes     Hospital follow up. Hypoglycemia coma, medics couldn't get a vein so they had to give her a shot to pull sugar from her liver.  Note: she would like to try and get a special order for the free style auto check so she stops passing out. Her insurance won't cover it.     Subjective        Gladys Garrison is a 62 y.o. female who presents to [facility].    History of Present Illness  Recent ED visits:    6/2 - low back pain, CT showed L1 compression fracture.  Neurosurgery recommended outpatient f/u.  She was given zanaflex, lidoderm patch, and a dose of toradol.  She has a morphine pump in place via pain management.     6/17 - hypoglycemic episode.  Blood sugar was down into the 20s.  She had not eaten well that day.  Given glucagon and sugar PO, monitored, and sent home.  She has noticed her blood sugar drops when her pain increases.  She has had multiple hypoglycemic episodes recently.  Her  tries to get her to eat a PB sandwich and sometimes she is just too sleepy to do so.     - currently she is just doing 40 units 70/30 insulin in the am.  None in the evening.     She is seeing Dr. Nunez for her metastatic breast cancer.  They have discussed that her worsening pain is unfortunately a poor prognostic sign.  She is currently doing Xgeva injections.  They plan to repeat CT scans chest / abdomen / pelvis in August to monitor progression.    Objective   /80   Pulse 75   Ht 154.9 cm (61\")   Wt 69.2 kg (152 lb 9.6 oz)   SpO2 99%   BMI 28.83 kg/m²     Estimated body mass index is 28.83 kg/m² as calculated from the following:    Height as of this encounter: 154.9 cm (61\").    Weight as of this encounter: 69.2 kg (152 lb 9.6 oz).     Physical Exam   GEN: In no acute distress, non toxic appearing  HEENT: Pupils equal and reactive to light, sclera clear. Mucous membranes moist. Oropharynx without " erythema or exudate. No cervical or submandibular lymphadenopathy.  CV: Regular rate and rhythm, no murmurs, 2+ peripheral pulses, No extremity edema.   RESP: Lungs clear to auscultation anteriorly and posteriorly in all lung fields bilaterally.    Result Review :    The following data was reviewed by: Chirag Robles DO on 06/19/2023:  CMP          5/12/2023    12:25 5/19/2023    14:54 5/24/2023    07:57 5/24/2023    08:24   CMP   Glucose  157   115    BUN  13   13    Creatinine 0.70  0.66  0.70  0.63    EGFR 99.2  100.6  99.2  101.7    Sodium  138   141    Potassium  3.8   4.0    Chloride  103   109    Calcium  9.1   8.7    Total Protein  6.9   6.8    Albumin  4.1   4.0    Globulin  2.8   2.8    Total Bilirubin  0.3   0.4    Alkaline Phosphatase  105   78    AST (SGOT)  20   23    ALT (SGPT)  15   15    Albumin/Globulin Ratio  1.5   1.4    BUN/Creatinine Ratio  19.7   20.6    Anion Gap  10.0  15.0  8.0      CBC          4/21/2023    09:48 5/19/2023    14:54 5/24/2023    07:43 5/24/2023    07:57   CBC   WBC 5.30  5.76  4.80     RBC 4.83  4.67  5.12     Hemoglobin 13.8  13.3  14.3  16.0    Hematocrit 42.2  40.7  45.2  47    MCV 87.4  87.2  88.3     MCH 28.6  28.5  27.9     MCHC 32.7  32.7  31.6     RDW 13.3  13.4  13.9     Platelets 114  117  116               Assessment and Plan     Diagnoses and all orders for this visit:    1. Type 2 diabetes mellitus with hyperglycemia, with long-term current use of insulin (Primary)  Labile blood sugars causing dangerous hypoglycemic episodes.  Possible multiple contributing factors: pain, metastatic cancer, stress / anxiety leading to poor appetite.  She is currently doing insulin 70/30 forty units in the am.  We will check A1C to see if she is consistently staying low on her blood sugar.  She would greatly benefit from a continuous glucose monitor given the fact that she is on insulin daily and had a recent hypoglycemic episode requiring an ED visit where her blood  sugar was in the 20s when paramedics arrived.  We will also check cholesterol, kidney function and liver function today.  F/u in 3 months.    -     Hemoglobin A1c  -     Lipid Panel  -     Continuous Blood Gluc  (FreeStyle Rajeev 14 Day [place]) device; 1 each Daily.  Dispense: 1 each; Refill: 0  -     Continuous Blood Gluc Sensor (FreeStyle Rajeev 14 Day Sensor) misc; 1 each Daily.  Dispense: 6 each; Refill: 3  -     Comprehensive Metabolic Panel    2. Hypoglycemic episode in patient with diabetes mellitus  See above  -     Hemoglobin A1c  -     Continuous Blood Gluc  (FreeStyle Rajeev 14 Day [place]) device; 1 each Daily.  Dispense: 1 each; Refill: 0  -     Continuous Blood Gluc Sensor (FreeStyle Rajeev 14 Day Sensor) misc; 1 each Daily.  Dispense: 6 each; Refill: 3  -     Comprehensive Metabolic Panel    3. Chronic bilateral low back pain without sciatica  Continue close f/u with Dr. Hunter (pain management).    Encouraged to call Dr. Ruff's office and schedule f/u appt for L1 compression fx.    4. Primary hypertension  Continue to closely monitor.    Continue lisinopril 20 mg daily.  Check kidney function and e'lytes.    -     Comprehensive Metabolic Panel       Follow Up     Return in about 3 months (around 9/19/2023) for T2DM f/u - needs 30 minutes .

## 2023-06-19 NOTE — OUTREACH NOTE
AMBULATORY CASE MANAGEMENT NOTE    Name and Relationship of Patient/Support Person: Gladys Garrison L - Self    Patient Outreach    Pt discharged from [facility] ED on 6/17/23, seen for hypoglycemia. RN-ACM outreach call made to pt, spoke very briefly. Explained role of RN-ACM and reason for call. Pt expresses dissatisfaction with ED visit. Pt saw her PCP this am for follow up, states she has lab appt scheduled for tomorrow. Unable to further outreach, pt ended call. Follow up outreach prn.     Robel SALCEDO  Ambulatory Case Management    6/19/2023, 16:02 EDT

## 2023-06-20 LAB
ALBUMIN SERPL-MCNC: 4.6 G/DL (ref 3.5–5.2)
ALBUMIN/GLOB SERPL: 1.3 G/DL
ALP SERPL-CCNC: 111 U/L (ref 39–117)
ALT SERPL W P-5'-P-CCNC: 18 U/L (ref 1–33)
ANION GAP SERPL CALCULATED.3IONS-SCNC: 12 MMOL/L (ref 5–15)
AST SERPL-CCNC: 26 U/L (ref 1–32)
BILIRUB SERPL-MCNC: 0.4 MG/DL (ref 0–1.2)
BUN SERPL-MCNC: 19 MG/DL (ref 8–23)
BUN/CREAT SERPL: 27.5 (ref 7–25)
CALCIUM SPEC-SCNC: 10 MG/DL (ref 8.6–10.5)
CHLORIDE SERPL-SCNC: 104 MMOL/L (ref 98–107)
CO2 SERPL-SCNC: 25 MMOL/L (ref 22–29)
CREAT SERPL-MCNC: 0.69 MG/DL (ref 0.57–1)
EGFRCR SERPLBLD CKD-EPI 2021: 99.5 ML/MIN/1.73
GLOBULIN UR ELPH-MCNC: 3.5 GM/DL
GLUCOSE SERPL-MCNC: 56 MG/DL (ref 65–99)
HBA1C MFR BLD: 7.1 % (ref 4.8–5.6)
POTASSIUM SERPL-SCNC: 4.3 MMOL/L (ref 3.5–5.2)
PROT SERPL-MCNC: 8.1 G/DL (ref 6–8.5)
SODIUM SERPL-SCNC: 141 MMOL/L (ref 136–145)

## 2023-06-20 PROCEDURE — 36415 COLL VENOUS BLD VENIPUNCTURE: CPT | Performed by: STUDENT IN AN ORGANIZED HEALTH CARE EDUCATION/TRAINING PROGRAM

## 2023-06-21 LAB
CHOLEST SERPL-MCNC: 201 MG/DL (ref 0–200)
HDLC SERPL-MCNC: 52 MG/DL (ref 40–60)
LDLC SERPL CALC-MCNC: 121 MG/DL (ref 0–100)
LDLC/HDLC SERPL: 2.26 {RATIO}
TRIGL SERPL-MCNC: 158 MG/DL (ref 0–150)
VLDLC SERPL-MCNC: 28 MG/DL (ref 5–40)

## 2023-06-23 ENCOUNTER — TELEPHONE (OUTPATIENT)
Dept: PAIN MEDICINE | Facility: CLINIC | Age: 63
End: 2023-06-23

## 2023-06-23 NOTE — TELEPHONE ENCOUNTER
Caller: Gladys Garrison    Relationship to patient: Self    Best call back number: [phone]    Patient is needing: WANTED TO LET DR. JERONIMO KNOW THAT SHE IS TAKING A LOT OF TYLENOL AND IBUPROFEN AND SHE IS HAVING SEVERAL DIABETIC EPISODES WITH HER SUGAR BOTTOMING. SHE IS EXPERIENCING A LOT OF PAIN WITH COMPLETING ANY ACTS OF DAILY LIVING. WANTS TO KNOW IF DR. JERONIMO OR A NURSE CAN GIVE HER A CALL BECAUSE SHE IS VERY STRESSED ABOUT ALL THIS.

## 2023-06-23 NOTE — TELEPHONE ENCOUNTER
Spoke with patient she was wanting to know if you want her to call her nurse and have her adjust the pump, she said she did it last time when you were on vacation I told her I would have to check with you first.

## 2023-07-24 ENCOUNTER — OFFICE VISIT (OUTPATIENT)
Dept: FAMILY MEDICINE CLINIC | Facility: CLINIC | Age: 63
End: 2023-07-24
Payer: MEDICARE

## 2023-07-24 VITALS
SYSTOLIC BLOOD PRESSURE: 150 MMHG | DIASTOLIC BLOOD PRESSURE: 84 MMHG | OXYGEN SATURATION: 95 % | TEMPERATURE: 97.7 F | HEIGHT: 61 IN | HEART RATE: 77 BPM | RESPIRATION RATE: 16 BRPM | WEIGHT: 152.2 LBS | BODY MASS INDEX: 28.74 KG/M2

## 2023-07-24 DIAGNOSIS — M79.671 ACUTE FOOT PAIN, RIGHT: Primary | ICD-10-CM

## 2023-07-24 PROCEDURE — 1160F RVW MEDS BY RX/DR IN RCRD: CPT | Performed by: NURSE PRACTITIONER

## 2023-07-24 PROCEDURE — 3077F SYST BP >= 140 MM HG: CPT | Performed by: NURSE PRACTITIONER

## 2023-07-24 PROCEDURE — 3079F DIAST BP 80-89 MM HG: CPT | Performed by: NURSE PRACTITIONER

## 2023-07-24 PROCEDURE — 3051F HG A1C>EQUAL 7.0%<8.0%: CPT | Performed by: NURSE PRACTITIONER

## 2023-07-24 PROCEDURE — 99213 OFFICE O/P EST LOW 20 MIN: CPT | Performed by: NURSE PRACTITIONER

## 2023-07-24 PROCEDURE — 1159F MED LIST DOCD IN RCRD: CPT | Performed by: NURSE PRACTITIONER
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
## 2023-07-25 ENCOUNTER — HOSPITAL ENCOUNTER (OUTPATIENT)
Dept: GENERAL RADIOLOGY | Facility: HOSPITAL | Age: 63
Discharge: HOME | End: 2023-07-25
Admitting: NURSE PRACTITIONER
Payer: MEDICARE

## 2023-07-25 DIAGNOSIS — M79.671 ACUTE FOOT PAIN, RIGHT: ICD-10-CM

## 2023-07-25 PROCEDURE — 73630 X-RAY EXAM OF FOOT: CPT

## 2023-07-26 ENCOUNTER — TELEPHONE (OUTPATIENT)
Dept: FAMILY MEDICINE CLINIC | Facility: CLINIC | Age: 63
End: 2023-07-26
Payer: MEDICARE

## 2023-07-26 NOTE — TELEPHONE ENCOUNTER
----- Message from CIRO Rodriguez sent at 7/26/2023 12:21 PM EDT -----  Please call Mrs. Garrison with her x ray result - thank you  Her right foot x rays are negative for any acute findings.  If she would like, we can make a referral for her to see a podiatrist.

## 2023-07-26 NOTE — TELEPHONE ENCOUNTER
HUB TO READ    Her right foot x rays are negative for any acute findings.  If she would like, we can make a referral for her to see a podiatrist.

## 2023-08-04 ENCOUNTER — OFFICE VISIT (OUTPATIENT)
Dept: ONCOLOGY | Facility: CLINIC | Age: 63
End: 2023-08-04
Payer: MEDICARE

## 2023-08-04 ENCOUNTER — HOSPITAL ENCOUNTER (OUTPATIENT)
Dept: CARDIOLOGY | Facility: HOSPITAL | Age: 63
Discharge: HOME | End: 2023-08-04
Payer: MEDICARE

## 2023-08-04 ENCOUNTER — LAB (OUTPATIENT)
Dept: LAB | Facility: HOSPITAL | Age: 63
End: 2023-08-04
Payer: MEDICARE

## 2023-08-04 VITALS
WEIGHT: 150 LBS | BODY MASS INDEX: 29.45 KG/M2 | TEMPERATURE: 98 F | SYSTOLIC BLOOD PRESSURE: 153 MMHG | RESPIRATION RATE: 18 BRPM | OXYGEN SATURATION: 90 % | HEIGHT: 60 IN | DIASTOLIC BLOOD PRESSURE: 82 MMHG | HEART RATE: 86 BPM

## 2023-08-04 DIAGNOSIS — M79.604 BILATERAL LOWER EXTREMITY PAIN: ICD-10-CM

## 2023-08-04 DIAGNOSIS — C79.51 MALIGNANT NEOPLASM METASTATIC TO BONE: Primary | ICD-10-CM

## 2023-08-04 DIAGNOSIS — C50.919 MALIGNANT NEOPLASM OF FEMALE BREAST, UNSPECIFIED ESTROGEN RECEPTOR STATUS, UNSPECIFIED LATERALITY, UNSPECIFIED SITE OF BREAST: Primary | ICD-10-CM

## 2023-08-04 DIAGNOSIS — C50.919 MALIGNANT NEOPLASM OF FEMALE BREAST, UNSPECIFIED ESTROGEN RECEPTOR STATUS, UNSPECIFIED LATERALITY, UNSPECIFIED SITE OF BREAST: ICD-10-CM

## 2023-08-04 DIAGNOSIS — M79.605 BILATERAL LOWER EXTREMITY PAIN: ICD-10-CM

## 2023-08-04 DIAGNOSIS — C79.51 MALIGNANT NEOPLASM METASTATIC TO BONE: ICD-10-CM

## 2023-08-04 LAB
BASOPHILS # BLD AUTO: 0.02 10*3/MM3 (ref 0–0.2)
BASOPHILS NFR BLD AUTO: 0.3 % (ref 0–1.5)
BH CV LOWER VASCULAR LEFT COMMON FEMORAL AUGMENT: NORMAL
BH CV LOWER VASCULAR LEFT COMMON FEMORAL COMPETENT: NORMAL
BH CV LOWER VASCULAR LEFT COMMON FEMORAL COMPRESS: NORMAL
BH CV LOWER VASCULAR LEFT COMMON FEMORAL PHASIC: NORMAL
BH CV LOWER VASCULAR LEFT COMMON FEMORAL SPONT: NORMAL
BH CV LOWER VASCULAR LEFT DISTAL FEMORAL COMPRESS: NORMAL
BH CV LOWER VASCULAR LEFT GASTRONEMIUS COMPRESS: NORMAL
BH CV LOWER VASCULAR LEFT GREATER SAPH AK COMPRESS: NORMAL
BH CV LOWER VASCULAR LEFT GREATER SAPH BK COMPRESS: NORMAL
BH CV LOWER VASCULAR LEFT LESSER SAPH COMPRESS: NORMAL
BH CV LOWER VASCULAR LEFT MID FEMORAL AUGMENT: NORMAL
BH CV LOWER VASCULAR LEFT MID FEMORAL COMPETENT: NORMAL
BH CV LOWER VASCULAR LEFT MID FEMORAL COMPRESS: NORMAL
BH CV LOWER VASCULAR LEFT MID FEMORAL PHASIC: NORMAL
BH CV LOWER VASCULAR LEFT MID FEMORAL SPONT: NORMAL
BH CV LOWER VASCULAR LEFT PERONEAL COMPRESS: NORMAL
BH CV LOWER VASCULAR LEFT POPLITEAL AUGMENT: NORMAL
BH CV LOWER VASCULAR LEFT POPLITEAL COMPETENT: NORMAL
BH CV LOWER VASCULAR LEFT POPLITEAL COMPRESS: NORMAL
BH CV LOWER VASCULAR LEFT POPLITEAL PHASIC: NORMAL
BH CV LOWER VASCULAR LEFT POPLITEAL SPONT: NORMAL
BH CV LOWER VASCULAR LEFT POSTERIOR TIBIAL COMPRESS: NORMAL
BH CV LOWER VASCULAR LEFT PROXIMAL FEMORAL COMPRESS: NORMAL
BH CV LOWER VASCULAR LEFT SAPHENOFEMORAL JUNCTION COMPRESS: NORMAL
BH CV LOWER VASCULAR RIGHT COMMON FEMORAL AUGMENT: NORMAL
BH CV LOWER VASCULAR RIGHT COMMON FEMORAL COMPETENT: NORMAL
BH CV LOWER VASCULAR RIGHT COMMON FEMORAL COMPRESS: NORMAL
BH CV LOWER VASCULAR RIGHT COMMON FEMORAL PHASIC: NORMAL
BH CV LOWER VASCULAR RIGHT COMMON FEMORAL SPONT: NORMAL
BH CV LOWER VASCULAR RIGHT DISTAL FEMORAL COMPRESS: NORMAL
BH CV LOWER VASCULAR RIGHT GASTRONEMIUS COMPRESS: NORMAL
BH CV LOWER VASCULAR RIGHT GREATER SAPH AK COMPRESS: NORMAL
BH CV LOWER VASCULAR RIGHT GREATER SAPH BK COMPRESS: NORMAL
BH CV LOWER VASCULAR RIGHT LESSER SAPH COMPRESS: NORMAL
BH CV LOWER VASCULAR RIGHT MID FEMORAL AUGMENT: NORMAL
BH CV LOWER VASCULAR RIGHT MID FEMORAL COMPETENT: NORMAL
BH CV LOWER VASCULAR RIGHT MID FEMORAL COMPRESS: NORMAL
BH CV LOWER VASCULAR RIGHT MID FEMORAL PHASIC: NORMAL
BH CV LOWER VASCULAR RIGHT MID FEMORAL SPONT: NORMAL
BH CV LOWER VASCULAR RIGHT PERONEAL COMPRESS: NORMAL
BH CV LOWER VASCULAR RIGHT POPLITEAL AUGMENT: NORMAL
BH CV LOWER VASCULAR RIGHT POPLITEAL COMPETENT: NORMAL
BH CV LOWER VASCULAR RIGHT POPLITEAL COMPRESS: NORMAL
BH CV LOWER VASCULAR RIGHT POPLITEAL PHASIC: NORMAL
BH CV LOWER VASCULAR RIGHT POPLITEAL SPONT: NORMAL
BH CV LOWER VASCULAR RIGHT POSTERIOR TIBIAL COMPRESS: NORMAL
BH CV LOWER VASCULAR RIGHT PROXIMAL FEMORAL COMPRESS: NORMAL
BH CV LOWER VASCULAR RIGHT SAPHENOFEMORAL JUNCTION COMPRESS: NORMAL
BH CV VAS PRELIMINARY FINDINGS SCRIPTING: 1
DEPRECATED RDW RBC AUTO: 42.6 FL (ref 37–54)
EOSINOPHIL # BLD AUTO: 0.07 10*3/MM3 (ref 0–0.4)
EOSINOPHIL NFR BLD AUTO: 1.1 % (ref 0.3–6.2)
ERYTHROCYTE [DISTWIDTH] IN BLOOD BY AUTOMATED COUNT: 13.3 % (ref 12.3–15.4)
HCT VFR BLD AUTO: 37 % (ref 34–46.6)
HGB BLD-MCNC: 11.7 G/DL (ref 12–15.9)
HOLD SPECIMEN: NORMAL
HOLD SPECIMEN: NORMAL
LYMPHOCYTES # BLD AUTO: 1.3 10*3/MM3 (ref 0.7–3.1)
LYMPHOCYTES NFR BLD AUTO: 19.8 % (ref 19.6–45.3)
MCH RBC QN AUTO: 28.3 PG (ref 26.6–33)
MCHC RBC AUTO-ENTMCNC: 31.6 G/DL (ref 31.5–35.7)
MCV RBC AUTO: 89.4 FL (ref 79–97)
MONOCYTES # BLD AUTO: 0.3 10*3/MM3 (ref 0.1–0.9)
MONOCYTES NFR BLD AUTO: 4.6 % (ref 5–12)
NEUTROPHILS NFR BLD AUTO: 4.86 10*3/MM3 (ref 1.7–7)
NEUTROPHILS NFR BLD AUTO: 74.2 % (ref 42.7–76)
PLATELET # BLD AUTO: 122 10*3/MM3 (ref 140–450)
PMV BLD AUTO: 9.3 FL (ref 6–12)
RBC # BLD AUTO: 4.14 10*6/MM3 (ref 3.77–5.28)
WBC NRBC COR # BLD: 6.55 10*3/MM3 (ref 3.4–10.8)

## 2023-08-04 PROCEDURE — 85025 COMPLETE CBC W/AUTO DIFF WBC: CPT

## 2023-08-04 PROCEDURE — 36415 COLL VENOUS BLD VENIPUNCTURE: CPT

## 2023-08-04 PROCEDURE — 93970 EXTREMITY STUDY: CPT

## 2023-08-07 ENCOUNTER — HOSPITAL ENCOUNTER (OUTPATIENT)
Dept: CT IMAGING | Facility: HOSPITAL | Age: 63
Discharge: HOME | End: 2023-08-07
Payer: MEDICARE

## 2023-08-07 ENCOUNTER — HOSPITAL ENCOUNTER (OUTPATIENT)
Dept: CT IMAGING | Facility: HOSPITAL | Age: 63
Discharge: HOME | End: 2023-08-07
Admitting: INTERNAL MEDICINE
Payer: MEDICARE

## 2023-08-07 ENCOUNTER — HOSPITAL ENCOUNTER (OUTPATIENT)
Dept: NUCLEAR MEDICINE | Facility: HOSPITAL | Age: 63
Discharge: HOME | End: 2023-08-07
Payer: MEDICARE

## 2023-08-07 DIAGNOSIS — C79.51 MALIGNANT NEOPLASM METASTATIC TO BONE: ICD-10-CM

## 2023-08-07 PROCEDURE — A9503 TC99M MEDRONATE: HCPCS | Performed by: INTERNAL MEDICINE

## 2023-08-07 PROCEDURE — 74177 CT ABD & PELVIS W/CONTRAST: CPT

## 2023-08-07 PROCEDURE — 71260 CT THORAX DX C+: CPT

## 2023-08-07 PROCEDURE — 25510000001 IOPAMIDOL PER 1 ML: Performed by: INTERNAL MEDICINE

## 2023-08-07 PROCEDURE — 78306 BONE IMAGING WHOLE BODY: CPT

## 2023-08-07 PROCEDURE — 0 TECHNETIUM MEDRONATE KIT: Performed by: INTERNAL MEDICINE

## 2023-08-07 RX ORDER — TC 99M MEDRONATE 20 MG/10ML
25 INJECTION, POWDER, LYOPHILIZED, FOR SOLUTION INTRAVENOUS
Status: COMPLETED | OUTPATIENT
Start: 2023-08-07 | End: 2023-08-07

## 2023-08-07 RX ADMIN — TC 99M MEDRONATE 25 MILLICURIE: 20 INJECTION, POWDER, LYOPHILIZED, FOR SOLUTION INTRAVENOUS at 09:44

## 2023-08-07 RX ADMIN — IOPAMIDOL 100 ML: 755 INJECTION, SOLUTION INTRAVENOUS at 11:03

## 2023-08-09 ENCOUNTER — TELEPHONE (OUTPATIENT)
Dept: ONCOLOGY | Facility: CLINIC | Age: 63
End: 2023-08-09
Payer: MEDICARE

## 2023-08-09 DIAGNOSIS — C79.51 MALIGNANT NEOPLASM METASTATIC TO BONE: Primary | ICD-10-CM

## 2023-08-09 NOTE — TELEPHONE ENCOUNTER
----- Message from Мария Nunez MD sent at 8/9/2023 11:04 AM EDT -----  She needs follow-up with Dr. Ruff with neurosurgery to review the compression fracture and to decide on if she needs surgical intervention.  Will see her in a few weeks for follow-up.  In the meantime she will continue Faslodex

## 2023-08-09 NOTE — TELEPHONE ENCOUNTER
Called pt to let her know that Dr. Nunez would like her to follow-up with Dr. Ruff regarding the compression fracture seen on her CT scan. I also told her that Dr. Nunez would like for her to see Dr. Laguerre as well to see if XRT is an option. Pt was agreeable to this. She stated that she has not been able to see Dr. Ruff due to that office canceling her appts. I told her I would fax a copy of her CT results to Dr. Ruff's office to see if they can schedule an appt with her. Pt verbalized understanding.

## 2023-08-10 ENCOUNTER — TELEPHONE (OUTPATIENT)
Dept: RADIATION ONCOLOGY | Facility: HOSPITAL | Age: 63
End: 2023-08-10
Payer: MEDICARE

## 2023-08-10 NOTE — TELEPHONE ENCOUNTER
Left office phone number: [phone] to have the patient call us back so that we can hopefully schedule her today for a consultation.

## 2023-08-11 ENCOUNTER — TELEPHONE (OUTPATIENT)
Dept: FAMILY MEDICINE CLINIC | Facility: CLINIC | Age: 63
End: 2023-08-11
Payer: MEDICARE

## 2023-08-11 DIAGNOSIS — R30.0 DYSURIA: Primary | ICD-10-CM

## 2023-08-11 RX ORDER — CEPHALEXIN 500 MG/1
500 CAPSULE ORAL 2 TIMES DAILY
Qty: 10 CAPSULE | Refills: 0 | Status: SHIPPED | OUTPATIENT
Start: 2023-08-11 | End: 2023-08-16

## 2023-08-11 NOTE — TELEPHONE ENCOUNTER
Caller: Gladys Garrison    Relationship: Self    Best call back number: [phone]    What medication are you requesting: ANTIBIOTIC    What are your current symptoms: UTI OR BLADDER INFECTION, PAINFUL URINATION    How long have you been experiencing symptoms: SEVERAL DAYS    Have you had these symptoms before:    [x] Yes  [] No    Have you been treated for these symptoms before:   [x] Yes  [] No    If a prescription is needed, what is your preferred pharmacy and phone number:    [address] [phone] [facility] [phone]

## 2023-08-11 NOTE — TELEPHONE ENCOUNTER
Caller: Gladys Garrison    Relationship: Self    Best call back number:     [phone]     What form or medical record are you requesting: WORK NOTE FOR 8/12/2023    Who is requesting this form or medical record from you: EMPLOYER    How would you like to receive the form or medical records (pick-up, mail, fax): [employer]

## 2023-08-15 ENCOUNTER — OFFICE VISIT (OUTPATIENT)
Dept: NEUROSURGERY | Facility: CLINIC | Age: 63
End: 2023-08-15
Payer: MEDICARE

## 2023-08-15 VITALS
RESPIRATION RATE: 18 BRPM | HEIGHT: 60 IN | BODY MASS INDEX: 30.15 KG/M2 | SYSTOLIC BLOOD PRESSURE: 184 MMHG | WEIGHT: 153.6 LBS | DIASTOLIC BLOOD PRESSURE: 115 MMHG | HEART RATE: 76 BPM

## 2023-08-15 DIAGNOSIS — C79.51 METASTASIS TO SPINAL COLUMN: Primary | ICD-10-CM

## 2023-08-15 DIAGNOSIS — S32.000A LUMBAR COMPRESSION FRACTURE, CLOSED, INITIAL ENCOUNTER: ICD-10-CM

## 2023-08-15 NOTE — PROGRESS NOTES
Neurosurgical Consultation      Gladys Garrison is a 62 y.o. female is here today for follow-up. She is a previous patient of Dr. Pradhan last seen in 2021. Today patient reports of back pain that radiates down bilateral legs     Chief Complaint   Patient presents with    Back Pain     Follow up extended         Previous treatment:    HPI: This is a 62-year-old woman with congenital anomalies including hypoplastic kidney, tetralogy of Fallot, coarctation of the aorta and congenital VSD status post 8 cardiac surgeries.  Per documentation she had a syncopal episode which led to a CT scan of her chest which was concerning for a breast mass.  She ended up being diagnosed with breast cancer and underwent a left-sided radical mastectomy and right-sided prophylactic mastectomy with subsequent breast reconstruction.  She was then subsequently diagnosed with bony metastases.  She comments to me that she has never had any radiation for any of her metastases.  She has known lesions in her L2-L5 vertebral bodies as well as indications of a new L1 vertebral body lesion.  She does have an L1 vertebral body fracture.  She has had progressive back pain.  She does relate this to a fall that happened in late May early June.  She comments that she has had some alteration in urinary function including hesitancy with increased strain necessary to evacuate.  She also has sensation of worsening constipation although she feels as though she knows when she has to go to the bathroom and is able to void.    Past Medical History:   Diagnosis Date    Allergic     Bone cancer     METASTATIC BONE    Bradycardia     SECONDARY TO ABLATION    Breast cancer 2017    mets to lymph nodes; did not do radiation    Cancer of unknown origin     Compression fx, thoracic spine, open, initial encounter     Coronary artery disease     COVID-19 09/01/2021    Diabetes mellitus     Heart disease, unspecified     Hepatitis C     RESOLVED WITH MEDICATION     Hyperlipidemia     Hypertension     Obesity (BMI 30-39.9) 2021    Rib fracture     Per patient    Sleep apnea     no machine    Tachycardia     ATRIAL    Type 2 diabetes mellitus 2017        Past Surgical History:   Procedure Laterality Date    BACK SURGERY      neck X 2    BREAST RECONSTRUCTION Bilateral     CARDIAC ABLATION       atrial tachycardia x 5 ablations     CARDIAC CATHETERIZATION      CARDIAC SURGERY      stent placed in aorta    CARDIAC SURGERY      6 surgeries as baby     CERVICAL FUSION ANTERIOR WITH ARTIFICIAL DISCECTOMY IMPLANTATION N/A 2020    Procedure: C4 VERTEBRECTOMY AND ANTERIOR CERIVCAL DISCECTOMY WITH FUSION OF CERVICAL THREE THROUGH FIVE WITH REMOVAL OF HARDWARE C5-C6;  Surgeon: Mark Kaba MD;  Location: [facility];  Service: Neurosurgery;  Laterality: N/A;     SECTION      x2    COLONOSCOPY N/A 10/23/2020    Procedure: COLONOSCOPY WITH POLYPECTOMY X6;  Surgeon: Stanley Bourne MD;  Location: [facility] ENDOSCOPY;  Service: Gastroenterology;  Laterality: N/A;  POLYPS, INTERNAL HEMORRHOIDS    ENDOMETRIAL ABLATION      REMOVAL SCAR TISSUE UTERINE    ENDOSCOPY N/A 10/23/2020    Procedure: ESOPHAGOGASTRODUODENOSCOPY WITH BIOPSY X 1 AREA;  Surgeon: Stanley Bourne MD;  Location: [facility] ENDOSCOPY;  Service: Gastroenterology;  Laterality: N/A;  GASTRITIS, ESOPHAGITIS, HIATAL HERNIA    KNEE SURGERY      MASTECTOMY Bilateral     NECK SURGERY      PACEMAKER IMPLANTATION      PAIN PUMP INSERTION/REVISION N/A 2022    Procedure: PAIN PUMP INSERTION AND INTRATHECAL CATHETER PLACEMENT;  Surgeon: Chuck Hunter MD;  Location: [facility] MAIN OR;  Service: Pain Management;  Laterality: N/A;        Current Outpatient Medications on File Prior to Visit   Medication Sig Dispense Refill    acetaminophen (TYLENOL) 650 MG 8 hr tablet Take 1 tablet by mouth As Needed for Mild Pain. TAKES BETWEEN PAIN MEDS      aspirin 81 MG chewable tablet Chew 1 tablet  "Daily. 30 tablet 1    Blood Glucose Monitoring Suppl (Accu-Chek Araceli) device Use as instructed   To test blood sugar bid 1 each 0    Calcium Carbonate-Vit D-Min (Calcium 600+D Plus Minerals) 600-400 MG-UNIT chewable tablet Chew 600 mg 2 (Two) Times a Day. 60 each 6    cephalexin (Keflex) 500 MG capsule Take 1 capsule by mouth 2 (Two) Times a Day for 5 days. 10 capsule 0    Continuous Blood Gluc  (FreeStyle Rajeev 14 Day Flomaton) device 1 each Daily. 1 each 0    Continuous Blood Gluc Sensor (FreeStyle Rajeev 14 Day Sensor) misc 1 each Daily. 6 each 3    diclofenac (VOLTAREN) 75 MG EC tablet Take 1 tablet by mouth 2 (Two) Times a Day. 10 tablet 0    fluticasone (FLONASE) 50 MCG/ACT nasal spray 2 sprays into the nostril(s) as directed by provider Daily. 16 g 1    ibuprofen (ADVIL,MOTRIN) 800 MG tablet Take 1 tablet by mouth Every 8 (Eight) Hours As Needed for Mild Pain. IN BETWEEN PAIN  MEDS 90 tablet 1    insulin NPH-insulin regular (humuLIN 70/30,novoLIN 70/30) (70-30) 100 UNIT/ML injection Inject 40 Units under the skin into the appropriate area as directed Every Morning.      Insulin Pen Needle (B-D UF III MINI PEN NEEDLES) 31G X 5 MM misc Use to inject insulin twice daily   DX: E11.9 100 each 0    Insulin Syringe-Needle U-100 28G X 1/2\" 1 ML misc 1 each 2 (Two) Times a Day. 100 each 6    lisinopril (PRINIVIL,ZESTRIL) 20 MG tablet Take 1 tablet by mouth Daily. Takes 4-5 times per week.      rosuvastatin (Crestor) 20 MG tablet Take 1 tablet by mouth Every Night. 90 tablet 0     No current facility-administered medications on file prior to visit.        Allergies   Allergen Reactions    Promethazine Other (See Comments)     Hyperactive mean    Tape Other (See Comments)     .blisters          Social History     Socioeconomic History    Marital status: Legally     Number of children: 2   Tobacco Use    Smoking status: Never     Passive exposure: Never    Smokeless tobacco: Never   Vaping Use    Vaping " "Use: Never used   Substance and Sexual Activity    Alcohol use: Yes     Alcohol/week: 1.0 standard drink     Types: 1 Glasses of wine per week     Comment: mixed drink once a week    Drug use: Not Currently    Sexual activity: Defer          Review of Systems   Constitutional:  Positive for activity change.   HENT: Negative.     Eyes: Negative.    Respiratory: Negative.     Cardiovascular: Negative.    Gastrointestinal: Negative.    Endocrine: Negative.    Genitourinary: Negative.    Musculoskeletal:  Positive for arthralgias, back pain and myalgias.   Skin: Negative.    Allergic/Immunologic: Negative.    Neurological:  Positive for weakness. Negative for numbness.   Hematological: Negative.    Psychiatric/Behavioral:  Positive for sleep disturbance.       Physical Examination:     Vitals:    08/15/23 1038   BP: (!) 184/115   BP Location: Right arm   Patient Position: Sitting   Cuff Size: Adult   Pulse: 76   Resp: 18   Weight: 69.7 kg (153 lb 9.6 oz)   Height: 152.4 cm (60\")   PainSc:   8   PainLoc: Back        Physical Exam        Vitals:    08/15/23 1038   PainSc:   8   PainLoc: Back            Neurological Exam   Patient is awake alert and oriented.  No signs of cognitive deficits.  She moves everything.  I do not appreciate any asymmetric weakness.    Result Review  The following data was reviewed by: Sterling Ruff MD on 08/15/2023:    Data reviewed : Radiologic studies CT abdomen pelvis from August 7, 2023 shows indication of of metastases in the proximal left femur, right inferior pelvic ramus, left iliac wing as well as the L2-L5 vertebral bodies.  These are reportedly unchanged.  There is new compression fracture of L1.  There is vertebral body height loss greater than 50% at L1.  I do not appreciate any posterior element involvement.  There is no MRI.       Assessment/plan:  This is a 62-year-old woman with metastatic breast cancer as well as a significant congenital anomalies in particular with her heart " having had 8 prior heart surgeries.  She has known metastases and has never had any radiation therapy in particular to her spine.  She had a fall approximately 2-1/2 months ago.  She has had worsening back pain since then and does have indication of a new L1 vertebral body fracture.  She does have spinal lesions and multiple vertebral bodies of the lumbar spine.  She does have some new bowel and bladder changes however no significant incontinence.  I recommend an MRI of the lumbar spine with and without contrast to evaluate for epidural disease and stenosis.  She is scheduled to follow-up with radiation oncology in the future.  If radiation is a possibility but I believe that this is a good first option if not it is possible we can explore vertebroplasty or kyphoplasty.  She will return to see me in approximately 4 weeks after completion of the MRI.    Diagnoses and all orders for this visit:    1. Metastasis to spinal column (Primary)  -     MRI Lumbar Spine With & Without Contrast; Future    2. Lumbar compression fracture, closed, initial encounter         Return in about 4 weeks (around 9/12/2023).            Sterling Ruff MD

## 2023-08-16 ENCOUNTER — TELEPHONE (OUTPATIENT)
Dept: ONCOLOGY | Facility: CLINIC | Age: 63
End: 2023-08-16
Payer: MEDICARE

## 2023-08-17 ENCOUNTER — HOSPITAL ENCOUNTER (OUTPATIENT)
Dept: RADIATION ONCOLOGY | Facility: HOSPITAL | Age: 63
Setting detail: RADIATION/ONCOLOGY SERIES
End: 2023-08-17
Payer: MEDICARE

## 2023-08-17 NOTE — PROGRESS NOTES
"  RADIATION THERAPY CONSULT NOTE    NAME: Gladys Garrison  YOB: 1960  MRN #: [id number]  DATE OF SERVICE: 8/18/2023  REFERRING PROVIDER: Мария Nunez MD  [address] [place] [address] 1  [place] [id number]  PRIMARY CARE PROVIDER: Chirag Robles DO    DIAGNOSIS:  Stage IV breast cancer    REASON FOR CONSULT/CHIEF COMPLAINT/CC:  \"Back pain\"  I was asked to see the patient at the request of the referring provider noted below for advice and recommendations regarding this diagnosis and the role of radiation therapy.                              REQUESTING PHYSICIAN:    Мария Nunez Md  [address] [place] [address] 1  [place] [id number]    RECORDS OBTAINED:  Records of the patients history including those obtained from the referring provider were reviewed and summarized in detail.    HISTORY OF PRESENT ILLNESS:  Gladys Garrison is a 62 y.o. female with multiple comorbidities including congenital abnormalities such as hypoplastic kidney, tetralogy of Fallot, coarctation of the aorta and congenital VSD status post repair.     Patient had a syncopal episode that resulted in work-up including a CT Chest which showed left breast mass in 2017. She underwent diagnostic mammogram and ultrasound which showed a 2 cm spiculated mass in the left breast at the 1 o'clock position 6 cm from the nipple. Biopsy confirmed malignancy as invasive moderately differentiated carcinoma, ER/MN positive, HER2 negative. FNA of left axillary nodule was positive for malignancy. She underwent left modified radical mastectomy and right prophylactic mastectomy and immediate breast reconstruction on 2/2/2017. 2/11 lymph nodes were positive for metastatic disease with extracapsular extension. She received adjuvant Arimidex from October 2017 to March 2018. Her postop course was complicated by left chest wall abscess which resulted in I&D and removal of the left tissue expander. She was referred for radiation " therapy but patient ultimately declined.    CT lumbar spine without contrast on 7/7/2023 showed progressive loss of vertebral body height and compression deformity of the L1 vertebral body since 5/24/2023, new 3 mm bony retropulsion, no gross subluxation, chronic compression fractures of T12 and L5 (unchanged), osseous metastatic disease in thoracic and lumbar spine and pelvis (unchanged), no high-grade lumbar canal stenosis or high-grade neuroforaminal stenosis is identified.    Patient was seen in the office by Dr. Nunez on 8/4/2023.    CT CAP with contrast on 8/7/2023 showed new/increased osteolytic/osteoblastic change within the L1 vertebral body with new compression deformity since 5/12/2023 consistent with disease progression; other osseous lesions within the chest, abdomen, and pelvis are otherwise appear stable; no convincing evidence of soft tissue organ metastasis within the chest, abdomen, or pelvis.    She was seen by Dr. Sterling Ruff ([facility] neurosurgery) on 8/15/2023 for back pain that radiates down both legs. Patient reported she had a fall approximately 2-1/2 months ago and had worsening back pain since then. She does have some new bowel and bladder changes however no significant incontinence.  Dr. Ruff recommended an MRI of the lumbar spine with and without contrast to evaluate for epidural disease and stenosis considering patient's known L1 vertebral body fracture and multiple spinal and vertebral body lesions of the lumbar spine. Dr. Ruff believes that radiation is a good first option and if unsuccessful they will explore vertebroplasty or kyphoplasty. Patient is scheduled to see him for follow-up on 9/26/2023. She has not been scheduled for MRI at this time.    The following portions of the patient's history were reviewed and updated as appropriate: allergies, current medications, past family history, past medical history, past social history, past surgical history and problem list. Reviewed  with the patient and remain unchanged.    PAST MEDICAL HISTORY:  she has a past medical history of Allergic, Bone cancer, Bradycardia, Breast cancer (2017), Cancer of unknown origin, Compression fx, thoracic spine, open, initial encounter, Coronary artery disease, COVID-19 (09/01/2021), Diabetes mellitus, Heart disease, unspecified, Hepatitis C, Hyperlipidemia, Hypertension, Obesity (BMI 30-39.9) (02/05/2021), Rib fracture, Sleep apnea, Tachycardia, and Type 2 diabetes mellitus (11/2017).    MEDICATIONS:    Current Outpatient Medications:     acetaminophen (TYLENOL) 650 MG 8 hr tablet, Take 1 tablet by mouth As Needed for Mild Pain. TAKES BETWEEN PAIN MEDS, Disp: , Rfl:     aspirin 81 MG chewable tablet, Chew 1 tablet Daily., Disp: 30 tablet, Rfl: 1    Blood Glucose Monitoring Suppl (Accu-Chek Araceli) device, Use as instructed   To test blood sugar bid, Disp: 1 each, Rfl: 0    Calcium Carbonate-Vit D-Min (Calcium 600+D Plus Minerals) 600-400 MG-UNIT chewable tablet, Chew 600 mg 2 (Two) Times a Day., Disp: 60 each, Rfl: 6    Continuous Blood Gluc  (FreeStyle Rajeev 14 Day [place]) device, 1 each Daily., Disp: 1 each, Rfl: 0    Continuous Blood Gluc Sensor (FreeStyle Rajeev 14 Day Sensor) misc, 1 each Daily., Disp: 6 each, Rfl: 3    diclofenac (VOLTAREN) 75 MG EC tablet, Take 1 tablet by mouth 2 (Two) Times a Day., Disp: 10 tablet, Rfl: 0    fluticasone (FLONASE) 50 MCG/ACT nasal spray, 2 sprays into the nostril(s) as directed by provider Daily., Disp: 16 g, Rfl: 1    ibuprofen (ADVIL,MOTRIN) 800 MG tablet, Take 1 tablet by mouth Every 8 (Eight) Hours As Needed for Mild Pain. IN BETWEEN PAIN  MEDS, Disp: 90 tablet, Rfl: 1    insulin NPH-insulin regular (humuLIN 70/30,novoLIN 70/30) (70-30) 100 UNIT/ML injection, Inject 40 Units under the skin into the appropriate area as directed Every Morning., Disp: , Rfl:     Insulin Pen Needle (B-D UF III MINI PEN NEEDLES) 31G X 5 MM misc, Use to inject insulin twice daily    "DX: E11.9, Disp: 100 each, Rfl: 0    Insulin Syringe-Needle U-100 28G X 1/2\" 1 ML misc, 1 each 2 (Two) Times a Day., Disp: 100 each, Rfl: 6    lisinopril (PRINIVIL,ZESTRIL) 20 MG tablet, Take 1 tablet by mouth Daily. Takes 4-5 times per week., Disp: , Rfl:     rosuvastatin (Crestor) 20 MG tablet, Take 1 tablet by mouth Every Night., Disp: 90 tablet, Rfl: 0    ALLERGIES:    Allergies   Allergen Reactions    Promethazine Other (See Comments)     Hyperactive mean    Tape Other (See Comments)     .blisters       PAST SURGICAL HISTORY:  she has a past surgical history that includes Cardiac surgery;  section; Neck surgery; Knee surgery (); Cardiac Ablation; Pacemaker Implantation; Mastectomy (Bilateral); Breast reconstruction (Bilateral); Cardiac surgery; Back surgery; cervical fusion anterior with artificial discectomy implantation (N/A, 2020); Cardiac catheterization; Esophagogastroduodenoscopy (N/A, 10/23/2020); Colonoscopy (N/A, 10/23/2020); Endometrial ablation; and Pain Pump Insertion/Revision (N/A, 2022).    PREVIOUS RADIOTHERAPY OR CHEMOTHERAPY:  no prior xrt    FAMILY HISTORY:  herfamily history includes Aneurysm in her father; Cancer in her maternal aunt; Diabetes in her sister; Diabetes type I in her half-sister; Heart attack in her sister; Heart disease in her mother; Lung cancer in her mother; No Known Problems in her brother, brother, and sister; Stroke in her mother; Thyroid cancer in her half-sister; Thyroid disease in her sister.    SOCIAL HISTORY:  she reports that she has never smoked. She has never been exposed to tobacco smoke. She has never used smokeless tobacco. She reports current alcohol use of about 1.0 standard drink per week. She reports that she does not currently use drugs.    PAIN AND PAIN MANAGEMENT:  Gladys ONBLES Bladimir reports a pain score of 8.  Given her pain assessment as noted, treatment options were discussed and the following options were decided upon as a " follow-up plan to address the patient's pain: continuation of current treatment plan for pain.  Palliative XRT as noted.    NUTRITIONAL STATUS:   no issues  KPS:  80:  Normal activity with effort; some signs or symptoms    PHQ-9 Total Score: distress screening tool completed.    REVIEW OF SYSTEMS:   Review of Systems   General: No fevers, chills, weight change, or drenching night sweats. Skin: No rashes or jaundice.  HEENT: No change in vision or hearing, no headaches.  Neck: No dysphagia or masses.  Heme/Lymph: No easy bruising or bleeding.  Respiratory System: No shortness of breath or cough.  Cardiovascular: No chest pain, palpitations, or dyspnea on exertion.  - Pacemaker. GI: No nausea, vomiting, diarrhea, melena, or hematochezia.  : No dysuria or hematuria.  Endocrine: No heat or cold intolerance. Musculoskeletal: as noted above.  Neuro: No weakness, numbness, syncope, or seizures. Psych: No mood changes or depression. Ext: Denies swelling.      Objective   VITAL SIGNS:  Vitals:    08/18/23 0946   BP: 154/92   Pulse: 90   Resp: 16   Temp: 98.3 øF (36.8 øC)   SpO2: 96%       PHYSICAL EXAM:  GENERAL:  No apparent distress. Sitting comfortably in room.    HEENT:  Normocephalic, atraumatic. Pupils are equal, round, reactive to light. Sclera anicteric. Conjunctiva not injected. Oropharynx without erythema, ulcerations or thrush.   NECK:  Supple with no masses.  LYMPHATIC:  No cervical, supraclavicular or axillary adenopathy appreciated bilaterally.   CARDIOVASCULAR:  S1 & S2 detected; no murmurs, rubs or gallops.  CHEST:  Clear to auscultation bilaterally; no wheezes, crackles or rubs. Work of breathing normal.  ABDOMEN:  Bowel sounds present. Abdomen is soft, nontender, nondistended.   MUSCULOSKELETAL:  No tenderness to palpation along the spine or scapulae. Normal range of motion. Pain at multiple levels lumbar spine--high and low.  EXTREMITIES:  No clubbing, cyanosis, edema.  SKIN:  No erythema, rashes,  ulcerations noted.   NEUROLOGIC:  Cranial nerves II-XII grossly intact bilaterally. No focal neurologic deficits.  PSYCHIATRIC:  Alert, aware, and appropriate.      PERTINENT IMAGING/PATHOLOGY/LABS (Medical Decision Making):      COORDINATION OF CARE:  A copy of this note is sent to the referring provider.    PATHOLOGY (Reviewed):     IMAGING (Reviewed): CT lumbar spine 7/7/2023--Osteosclerotic metastatic disease demonstrated at T11, T12, L1, L4 and L5    LABS (Reviewed):  HEMATOLOGY:  WBC   Date Value Ref Range Status   08/04/2023 6.55 3.40 - 10.80 10*3/mm3 Final     RBC   Date Value Ref Range Status   08/04/2023 4.14 3.77 - 5.28 10*6/mm3 Final     Hemoglobin   Date Value Ref Range Status   08/04/2023 11.7 (L) 12.0 - 15.9 g/dL Final     Hematocrit   Date Value Ref Range Status   08/04/2023 37.0 34.0 - 46.6 % Final     Platelets   Date Value Ref Range Status   08/04/2023 122 (L) 140 - 450 10*3/mm3 Final     CHEMISTRY:  Lab Results   Component Value Date    GLUCOSE 74 07/18/2023    BUN 20 07/18/2023    CREATININE 0.77 07/18/2023    EGFRIFNONA 70 12/20/2021    EGFRIFAFRI >60 10/12/2018    BCR 26.0 (H) 07/18/2023    K 4.2 07/18/2023    CO2 26.0 07/18/2023    CALCIUM 9.3 07/18/2023    PROTENTOTREF 7.4 12/14/2020    ALBUMIN 4.3 07/18/2023    LABIL2 0.9 12/14/2020    AST 19 07/18/2023    ALT 14 07/18/2023     Assessment & Plan   ASSESSMENT AND PLAN:      Stage IV Breast cancer  -Painful disease in upper L and lower L spine  -CT lumbar spine shows osteosclerotic metastatic disease, T11, T12, L1, L4 and L5.  CT sim ordered.  Patient wanting to wait until returns from upcoming vacation.  Will offer to start now.  30 Gy in 10 fractions recommended. No neuro symptoms or exam or history.    This assessment comes from my review of the imaging, pathology, physician notes and other pertinent information as mentioned.    DISPOSITION:  Palliative XRT indicated;CT sim ordered.  Reviewed Neurosurgery notes and next steps.    TIME  SPENT WITH PATIENT:  I spent 45 minutes caring for Gladys on this date of service. This time includes time spent by me in the following activities: preparing for the visit, reviewing tests, obtaining and/or reviewing a separately obtained history, performing a medically appropriate examination and/or evaluation, counseling and educating the patient/family/caregiver, ordering medications, tests, or procedures, documenting information in the medical record, independently interpreting results and communicating that information with the patient/family/caregiver, and care coordination           CC: Мария Nunez,* Chirag Robles, RAMÓN Ruff MD      Approved by:     Christ Laguerre MD

## 2023-08-18 ENCOUNTER — CONSULT (OUTPATIENT)
Dept: RADIATION ONCOLOGY | Facility: HOSPITAL | Age: 63
End: 2023-08-18
Payer: MEDICARE

## 2023-08-18 ENCOUNTER — HOSPITAL ENCOUNTER (OUTPATIENT)
Dept: ONCOLOGY | Facility: HOSPITAL | Age: 63
Discharge: HOME | End: 2023-08-18
Payer: MEDICARE

## 2023-08-18 VITALS
BODY MASS INDEX: 29.49 KG/M2 | HEIGHT: 60 IN | WEIGHT: 150.2 LBS | OXYGEN SATURATION: 94 % | SYSTOLIC BLOOD PRESSURE: 165 MMHG | HEART RATE: 78 BPM | TEMPERATURE: 98.1 F | DIASTOLIC BLOOD PRESSURE: 80 MMHG | RESPIRATION RATE: 16 BRPM

## 2023-08-18 VITALS
BODY MASS INDEX: 29.72 KG/M2 | HEART RATE: 90 BPM | OXYGEN SATURATION: 96 % | TEMPERATURE: 98.3 F | RESPIRATION RATE: 16 BRPM | DIASTOLIC BLOOD PRESSURE: 92 MMHG | HEIGHT: 60 IN | WEIGHT: 151.4 LBS | SYSTOLIC BLOOD PRESSURE: 154 MMHG

## 2023-08-18 DIAGNOSIS — C79.51 MALIGNANT NEOPLASM METASTATIC TO BONE: Primary | ICD-10-CM

## 2023-08-18 LAB
ALBUMIN SERPL-MCNC: 4.4 G/DL (ref 3.5–5.2)
ALBUMIN/GLOB SERPL: 1.5 G/DL
ALP SERPL-CCNC: 92 U/L (ref 39–117)
ALT SERPL W P-5'-P-CCNC: 16 U/L (ref 1–33)
ANION GAP SERPL CALCULATED.3IONS-SCNC: 10 MMOL/L (ref 5–15)
AST SERPL-CCNC: 22 U/L (ref 1–32)
BASOPHILS # BLD AUTO: 0.01 10*3/MM3 (ref 0–0.2)
BASOPHILS NFR BLD AUTO: 0.2 % (ref 0–1.5)
BILIRUB SERPL-MCNC: 0.4 MG/DL (ref 0–1.2)
BUN SERPL-MCNC: 19 MG/DL (ref 8–23)
BUN/CREAT SERPL: 29.2 (ref 7–25)
CALCIUM SPEC-SCNC: 9.8 MG/DL (ref 8.6–10.5)
CHLORIDE SERPL-SCNC: 103 MMOL/L (ref 98–107)
CO2 SERPL-SCNC: 27 MMOL/L (ref 22–29)
CREAT SERPL-MCNC: 0.65 MG/DL (ref 0.57–1)
DEPRECATED RDW RBC AUTO: 43.5 FL (ref 37–54)
EGFRCR SERPLBLD CKD-EPI 2021: 100.9 ML/MIN/1.73
EOSINOPHIL # BLD AUTO: 0.05 10*3/MM3 (ref 0–0.4)
EOSINOPHIL NFR BLD AUTO: 1.2 % (ref 0.3–6.2)
ERYTHROCYTE [DISTWIDTH] IN BLOOD BY AUTOMATED COUNT: 13.6 % (ref 12.3–15.4)
GLOBULIN UR ELPH-MCNC: 2.9 GM/DL
GLUCOSE SERPL-MCNC: 138 MG/DL (ref 65–99)
HCT VFR BLD AUTO: 40.1 % (ref 34–46.6)
HGB BLD-MCNC: 12.9 G/DL (ref 12–15.9)
LYMPHOCYTES # BLD AUTO: 0.94 10*3/MM3 (ref 0.7–3.1)
LYMPHOCYTES NFR BLD AUTO: 22 % (ref 19.6–45.3)
MCH RBC QN AUTO: 28.9 PG (ref 26.6–33)
MCHC RBC AUTO-ENTMCNC: 32.2 G/DL (ref 31.5–35.7)
MCV RBC AUTO: 89.9 FL (ref 79–97)
MONOCYTES # BLD AUTO: 0.32 10*3/MM3 (ref 0.1–0.9)
MONOCYTES NFR BLD AUTO: 7.5 % (ref 5–12)
NEUTROPHILS NFR BLD AUTO: 2.95 10*3/MM3 (ref 1.7–7)
NEUTROPHILS NFR BLD AUTO: 69.1 % (ref 42.7–76)
PLATELET # BLD AUTO: 95 10*3/MM3 (ref 140–450)
PMV BLD AUTO: 9.8 FL (ref 6–12)
POTASSIUM SERPL-SCNC: 4.5 MMOL/L (ref 3.5–5.2)
PROT SERPL-MCNC: 7.3 G/DL (ref 6–8.5)
RBC # BLD AUTO: 4.46 10*6/MM3 (ref 3.77–5.28)
SODIUM SERPL-SCNC: 140 MMOL/L (ref 136–145)
WBC NRBC COR # BLD: 4.27 10*3/MM3 (ref 3.4–10.8)

## 2023-08-18 PROCEDURE — 25010000002 FULVESTRANT PER 25 MG: Performed by: NURSE PRACTITIONER

## 2023-08-18 PROCEDURE — 85025 COMPLETE CBC W/AUTO DIFF WBC: CPT | Performed by: NURSE PRACTITIONER

## 2023-08-18 PROCEDURE — 77290 THER RAD SIMULAJ FIELD CPLX: CPT | Performed by: RADIOLOGY

## 2023-08-18 PROCEDURE — G0463 HOSPITAL OUTPT CLINIC VISIT: HCPCS | Performed by: RADIOLOGY

## 2023-08-18 PROCEDURE — 77334 RADIATION TREATMENT AID(S): CPT | Performed by: RADIOLOGY

## 2023-08-18 PROCEDURE — 96402 CHEMO HORMON ANTINEOPL SQ/IM: CPT

## 2023-08-18 PROCEDURE — 80053 COMPREHEN METABOLIC PANEL: CPT | Performed by: NURSE PRACTITIONER

## 2023-08-18 RX ORDER — LAMOTRIGINE 25 MG/1
500 TABLET ORAL ONCE
Status: CANCELLED | OUTPATIENT
Start: 2023-08-18

## 2023-08-18 RX ORDER — LAMOTRIGINE 25 MG/1
500 TABLET ORAL ONCE
Status: COMPLETED | OUTPATIENT
Start: 2023-08-18 | End: 2023-08-18

## 2023-08-18 RX ADMIN — FULVESTRANT 500 MG: 50 INJECTION, SOLUTION INTRAMUSCULAR at 11:33

## 2023-08-21 ENCOUNTER — TELEPHONE (OUTPATIENT)
Dept: FAMILY MEDICINE CLINIC | Facility: CLINIC | Age: 63
End: 2023-08-21
Payer: MEDICARE

## 2023-08-21 NOTE — TELEPHONE ENCOUNTER
Caller: Gladys Garrison    Relationship: Self    Best call back number: [phone]     What was the call regarding: THE PATIENT CALLED TO ALERT DR. DELAROSA THAT [facility]ITA FROM [facility] [employer] FAXED OVER A REQUEST FOR MEDICAL SUPPLIES 8/17/2023.    SHE PROVIDED RITA'S PHONE NUMBER IF NEEDED - [phone]

## 2023-08-25 ENCOUNTER — TELEPHONE (OUTPATIENT)
Dept: FAMILY MEDICINE CLINIC | Facility: CLINIC | Age: 63
End: 2023-08-25
Payer: MEDICARE

## 2023-08-25 NOTE — TELEPHONE ENCOUNTER
Patient wants to know if we have another sample of the Free Style Rajeev she can have.  I told her I wasn't sure we had those and she said we have already given her one.  She is waiting on her rx to be ordered and processed.

## 2023-08-25 NOTE — TELEPHONE ENCOUNTER
No.  DME / supplies usually goes to [facility]. But I have not seen anything.  I have printed all the signature faxes today and I have not seen any orders coming from [facility]     Maybe have them fax to telephone room fax  [phone] (prescriptions)

## 2023-08-25 NOTE — TELEPHONE ENCOUNTER
Spoke with patient at 2:53pm and she said [facility] received the form and she has already paid her portion and will receive the shipment in 4 days.

## 2023-08-25 NOTE — TELEPHONE ENCOUNTER
I worked with Dr. Robles one day earlier this week and I left that message in the clinical pool because I knew I wouldn't be with him the following day.  I haven't seen any paperwork on her come through.  He doesn't have anything in his desk, and the MA box for paperwork is empty.

## 2023-08-25 NOTE — TELEPHONE ENCOUNTER
Patient has called back because she has not heard anything from us.  Have we ordered this from [facility]?  This was still sitting in the clinical pool since 8/21.

## 2023-08-28 ENCOUNTER — TELEPHONE (OUTPATIENT)
Dept: ONCOLOGY | Facility: CLINIC | Age: 63
End: 2023-08-28

## 2023-08-28 NOTE — TELEPHONE ENCOUNTER
Caller: Glayds Garrison    Relationship to patient: Self    Best call back number: [phone]    Chief complaint: RESCHEDULE     Type of visit: LAB AND FOLLOW UP    Requested date: MONDAY, TUES , THURS FRIDAY THIS WEEK, ANY WEEK BUT THIS WEEK       If rescheduling, when is the original appointment: 9-1     Additional notes:PLEASE ADVISE

## 2023-08-30 PROCEDURE — 77334 RADIATION TREATMENT AID(S): CPT | Performed by: RADIOLOGY

## 2023-08-30 PROCEDURE — 77300 RADIATION THERAPY DOSE PLAN: CPT | Performed by: RADIOLOGY

## 2023-08-30 PROCEDURE — 77295 3-D RADIOTHERAPY PLAN: CPT | Performed by: RADIOLOGY

## 2023-09-01 ENCOUNTER — HOSPITAL ENCOUNTER (OUTPATIENT)
Dept: RADIATION ONCOLOGY | Facility: HOSPITAL | Age: 63
Setting detail: RADIATION/ONCOLOGY SERIES
End: 2023-09-01
Payer: MEDICARE

## 2023-09-05 ENCOUNTER — APPOINTMENT (OUTPATIENT)
Dept: RADIATION ONCOLOGY | Facility: HOSPITAL | Age: 63
End: 2023-09-05
Payer: MEDICARE

## 2023-09-05 ENCOUNTER — RADIATION ONCOLOGY WEEKLY ASSESSMENT (OUTPATIENT)
Dept: RADIATION ONCOLOGY | Facility: HOSPITAL | Age: 63
End: 2023-09-05
Payer: MEDICARE

## 2023-09-05 ENCOUNTER — HOSPITAL ENCOUNTER (OUTPATIENT)
Dept: RADIATION ONCOLOGY | Facility: HOSPITAL | Age: 63
Discharge: HOME | End: 2023-09-05
Payer: MEDICARE

## 2023-09-05 VITALS
RESPIRATION RATE: 15 BRPM | HEART RATE: 85 BPM | BODY MASS INDEX: 29.61 KG/M2 | TEMPERATURE: 98.3 F | SYSTOLIC BLOOD PRESSURE: 180 MMHG | WEIGHT: 150.8 LBS | DIASTOLIC BLOOD PRESSURE: 98 MMHG | HEIGHT: 60 IN | OXYGEN SATURATION: 96 %

## 2023-09-05 DIAGNOSIS — C79.51 MALIGNANT NEOPLASM METASTATIC TO BONE: Primary | ICD-10-CM

## 2023-09-05 LAB
RAD ONC ARIA COURSE ID: NORMAL
RAD ONC ARIA COURSE LAST TREATMENT DATE: NORMAL
RAD ONC ARIA COURSE START DATE: NORMAL
RAD ONC ARIA COURSE TREATMENT ELAPSED DAYS: 0
RAD ONC ARIA FIRST TREATMENT DATE: NORMAL
RAD ONC ARIA PLAN FRACTIONS TREATED TO DATE: 1
RAD ONC ARIA PLAN ID: NORMAL
RAD ONC ARIA PLAN PRESCRIBED DOSE PER FRACTION: 3 GY
RAD ONC ARIA PLAN PRIMARY REFERENCE POINT: NORMAL
RAD ONC ARIA PLAN TOTAL FRACTIONS PRESCRIBED: 10
RAD ONC ARIA PLAN TOTAL PRESCRIBED DOSE: 3000 CGY
RAD ONC ARIA REFERENCE POINT DOSAGE GIVEN TO DATE: 3 GY
RAD ONC ARIA REFERENCE POINT ID: NORMAL
RAD ONC ARIA REFERENCE POINT SESSION DOSAGE GIVEN: 3 GY

## 2023-09-05 PROCEDURE — FACE2FACE: Performed by: RADIOLOGY

## 2023-09-05 PROCEDURE — 77280 THER RAD SIMULAJ FIELD SMPL: CPT | Performed by: RADIOLOGY

## 2023-09-05 PROCEDURE — 77412 RADIATION TX DELIVERY LVL 3: CPT | Performed by: RADIOLOGY

## 2023-09-05 PROCEDURE — 77387 GUIDANCE FOR RADJ TX DLVR: CPT | Performed by: RADIOLOGY

## 2023-09-05 PROCEDURE — 77427 RADIATION TX MANAGEMENT X5: CPT | Performed by: RADIOLOGY

## 2023-09-05 NOTE — PROGRESS NOTES
"NAME: Gladys Garrison DATE: 2023   : 1960    MRN: [id number] DIAGNOSIS:   Encounter Diagnosis   Name Primary?    Malignant neoplasm metastatic to bone Yes          RADIATION ON TREATMENT VISIT NOTE - GENERAL     Treatment Summary    Treatment Site Ref. ID Energy Dose/Fx (cGy) #Fx Dose Correction (cGy) Total Dose (cGy) Start Date End Date Elapsed Days   T11_L5 T11_L5 10X 300 1 / 10 0 300 2023 0         General:     Review of Systems  [x] No new complaints [] Cough   [] Dysphagia  [] Bowel changes   [] Fever/chills  [] Nausea/vomiting  [] Skin itching/soreness/breakdown  [x] Fatigue, severity: 1 [x] Pain, severity: 10, location: lower back/ tailbone    Nausea regimen:   Pain medication regimen:   Bowel regimen:   Skin regimen:     Subjective:  She started treatment today, she is having pain in her lower back and tailbone, no other concerns or complaints.    Follows with Pain Clinic    KPS: 80     Vital Signs: /98   Pulse 85   Temp 98.3 °F (36.8 °C) (Oral)   Resp 15   Ht 152.4 cm (60\")   Wt 68.4 kg (150 lb 12.8 oz)   LMP  (LMP Unknown)   SpO2 96%   BMI 29.45 kg/m²     Weight:   Wt Readings from Last 3 Encounters:   23 68.4 kg (150 lb 12.8 oz)   23 68.1 kg (150 lb 3.2 oz)   23 68.7 kg (151 lb 6.4 oz)       Medication:   Current Outpatient Medications:     acetaminophen (TYLENOL) 650 MG 8 hr tablet, Take 1 tablet by mouth As Needed for Mild Pain. TAKES BETWEEN PAIN MEDS, Disp: , Rfl:     aspirin 81 MG chewable tablet, Chew 1 tablet Daily., Disp: 30 tablet, Rfl: 1    Blood Glucose Monitoring Suppl (Accu-Chek Araceli) device, Use as instructed   To test blood sugar bid, Disp: 1 each, Rfl: 0    Calcium Carbonate-Vit D-Min (Calcium 600+D Plus Minerals) 600-400 MG-UNIT chewable tablet, Chew 600 mg 2 (Two) Times a Day., Disp: 60 each, Rfl: 6    Continuous Blood Gluc  (FreeStyle Rajeev 14 Day [place]) device, 1 each Daily., Disp: 1 each, Rfl: 0    Continuous Blood " "Gluc Sensor (FreeStyle Rajeev 14 Day Sensor) misc, 1 each Daily., Disp: 6 each, Rfl: 3    diclofenac (VOLTAREN) 75 MG EC tablet, Take 1 tablet by mouth 2 (Two) Times a Day., Disp: 10 tablet, Rfl: 0    fluticasone (FLONASE) 50 MCG/ACT nasal spray, 2 sprays into the nostril(s) as directed by provider Daily., Disp: 16 g, Rfl: 1    ibuprofen (ADVIL,MOTRIN) 800 MG tablet, Take 1 tablet by mouth Every 8 (Eight) Hours As Needed for Mild Pain. IN BETWEEN PAIN  MEDS, Disp: 90 tablet, Rfl: 1    insulin NPH-insulin regular (humuLIN 70/30,novoLIN 70/30) (70-30) 100 UNIT/ML injection, Inject 40 Units under the skin into the appropriate area as directed Every Morning., Disp: , Rfl:     Insulin Pen Needle (B-D UF III MINI PEN NEEDLES) 31G X 5 MM misc, Use to inject insulin twice daily   DX: E11.9, Disp: 100 each, Rfl: 0    Insulin Syringe-Needle U-100 28G X 1/2\" 1 ML misc, 1 each 2 (Two) Times a Day., Disp: 100 each, Rfl: 6    lisinopril (PRINIVIL,ZESTRIL) 20 MG tablet, Take 1 tablet by mouth Daily. Takes 4-5 times per week., Disp: , Rfl:     rosuvastatin (Crestor) 20 MG tablet, Take 1 tablet by mouth Every Night., Disp: 90 tablet, Rfl: 0     LABS (Reviewed):  Hematology  WBC   Date Value Ref Range Status   08/18/2023 4.27 3.40 - 10.80 10*3/mm3 Final     RBC   Date Value Ref Range Status   08/18/2023 4.46 3.77 - 5.28 10*6/mm3 Final     Hemoglobin   Date Value Ref Range Status   08/18/2023 12.9 12.0 - 15.9 g/dL Final     Hematocrit   Date Value Ref Range Status   08/18/2023 40.1 34.0 - 46.6 % Final     Platelets   Date Value Ref Range Status   08/18/2023 95 (L) 140 - 450 10*3/mm3 Final     Chemistry   Lab Results   Component Value Date    GLUCOSE 138 (H) 08/18/2023    BUN 19 08/18/2023    CREATININE 0.65 08/18/2023    EGFRIFNONA 70 12/20/2021    EGFRIFAFRI >60 10/12/2018    BCR 29.2 (H) 08/18/2023    K 4.5 08/18/2023    CO2 27.0 08/18/2023    CALCIUM 9.8 08/18/2023    PROTENTOTREF 7.4 12/14/2020    ALBUMIN 4.4 08/18/2023    LABIL2 " 0.9 12/14/2020    AST 22 08/18/2023    ALT 16 08/18/2023       Physical Exam:     Neck: [] Lymphadenopathy  Lungs: [x] Clear to auscultation  CVS: [x] Regular rate & rhythm  Skin: [x] stGstrstastdstest:st st1st Comments/Notes:      [x] Review of labs, images, dosimetry, dose delivered, & treatment parameters.   Comments:     [x] Patient treatment setup reviewed.    Comments:     Recommendations:  continue XRT and supportive measures      [x] Continue RT  [] Change RT Plan [] Hold RT, length:      Approved Electronically By:  Christ Laguerre MD, 9/5/2023, 16:14 EDT      Confidentiality of this medical record shall be maintained except when use or disclosure is required or permitted by law, regulation or written authorization by the patient.

## 2023-09-06 ENCOUNTER — HOSPITAL ENCOUNTER (OUTPATIENT)
Dept: RADIATION ONCOLOGY | Facility: HOSPITAL | Age: 63
Discharge: HOME | End: 2023-09-06
Payer: MEDICARE

## 2023-09-06 LAB
RAD ONC ARIA COURSE ID: NORMAL
RAD ONC ARIA COURSE LAST TREATMENT DATE: NORMAL
RAD ONC ARIA COURSE START DATE: NORMAL
RAD ONC ARIA COURSE TREATMENT ELAPSED DAYS: 1
RAD ONC ARIA FIRST TREATMENT DATE: NORMAL
RAD ONC ARIA PLAN FRACTIONS TREATED TO DATE: 2
RAD ONC ARIA PLAN ID: NORMAL
RAD ONC ARIA PLAN PRESCRIBED DOSE PER FRACTION: 3 GY
RAD ONC ARIA PLAN PRIMARY REFERENCE POINT: NORMAL
RAD ONC ARIA PLAN TOTAL FRACTIONS PRESCRIBED: 10
RAD ONC ARIA PLAN TOTAL PRESCRIBED DOSE: 3000 CGY
RAD ONC ARIA REFERENCE POINT DOSAGE GIVEN TO DATE: 6 GY
RAD ONC ARIA REFERENCE POINT ID: NORMAL
RAD ONC ARIA REFERENCE POINT SESSION DOSAGE GIVEN: 3 GY

## 2023-09-06 PROCEDURE — G6002 STEREOSCOPIC X-RAY GUIDANCE: HCPCS | Performed by: RADIOLOGY

## 2023-09-06 PROCEDURE — 77387 GUIDANCE FOR RADJ TX DLVR: CPT | Performed by: RADIOLOGY

## 2023-09-06 PROCEDURE — 77412 RADIATION TX DELIVERY LVL 3: CPT | Performed by: RADIOLOGY

## 2023-09-07 ENCOUNTER — HOSPITAL ENCOUNTER (OUTPATIENT)
Dept: RADIATION ONCOLOGY | Facility: HOSPITAL | Age: 63
Discharge: HOME | End: 2023-09-07
Payer: MEDICARE

## 2023-09-07 LAB
RAD ONC ARIA COURSE ID: NORMAL
RAD ONC ARIA COURSE LAST TREATMENT DATE: NORMAL
RAD ONC ARIA COURSE START DATE: NORMAL
RAD ONC ARIA COURSE TREATMENT ELAPSED DAYS: 2
RAD ONC ARIA FIRST TREATMENT DATE: NORMAL
RAD ONC ARIA PLAN FRACTIONS TREATED TO DATE: 3
RAD ONC ARIA PLAN ID: NORMAL
RAD ONC ARIA PLAN PRESCRIBED DOSE PER FRACTION: 3 GY
RAD ONC ARIA PLAN PRIMARY REFERENCE POINT: NORMAL
RAD ONC ARIA PLAN TOTAL FRACTIONS PRESCRIBED: 10
RAD ONC ARIA PLAN TOTAL PRESCRIBED DOSE: 3000 CGY
RAD ONC ARIA REFERENCE POINT DOSAGE GIVEN TO DATE: 9 GY
RAD ONC ARIA REFERENCE POINT ID: NORMAL
RAD ONC ARIA REFERENCE POINT SESSION DOSAGE GIVEN: 3 GY

## 2023-09-07 PROCEDURE — G6002 STEREOSCOPIC X-RAY GUIDANCE: HCPCS | Performed by: RADIOLOGY

## 2023-09-07 PROCEDURE — 77387 GUIDANCE FOR RADJ TX DLVR: CPT | Performed by: RADIOLOGY

## 2023-09-07 PROCEDURE — 77412 RADIATION TX DELIVERY LVL 3: CPT | Performed by: RADIOLOGY

## 2023-09-07 PROCEDURE — 77336 RADIATION PHYSICS CONSULT: CPT | Performed by: RADIOLOGY

## 2023-09-08 ENCOUNTER — HOSPITAL ENCOUNTER (OUTPATIENT)
Dept: RADIATION ONCOLOGY | Facility: HOSPITAL | Age: 63
Discharge: HOME | End: 2023-09-08
Payer: MEDICARE

## 2023-09-08 LAB
RAD ONC ARIA COURSE ID: NORMAL
RAD ONC ARIA COURSE LAST TREATMENT DATE: NORMAL
RAD ONC ARIA COURSE START DATE: NORMAL
RAD ONC ARIA COURSE TREATMENT ELAPSED DAYS: 3
RAD ONC ARIA FIRST TREATMENT DATE: NORMAL
RAD ONC ARIA PLAN FRACTIONS TREATED TO DATE: 4
RAD ONC ARIA PLAN ID: NORMAL
RAD ONC ARIA PLAN PRESCRIBED DOSE PER FRACTION: 3 GY
RAD ONC ARIA PLAN PRIMARY REFERENCE POINT: NORMAL
RAD ONC ARIA PLAN TOTAL FRACTIONS PRESCRIBED: 10
RAD ONC ARIA PLAN TOTAL PRESCRIBED DOSE: 3000 CGY
RAD ONC ARIA REFERENCE POINT DOSAGE GIVEN TO DATE: 12 GY
RAD ONC ARIA REFERENCE POINT ID: NORMAL
RAD ONC ARIA REFERENCE POINT SESSION DOSAGE GIVEN: 3 GY

## 2023-09-08 PROCEDURE — 77387 GUIDANCE FOR RADJ TX DLVR: CPT | Performed by: RADIOLOGY

## 2023-09-08 PROCEDURE — G6002 STEREOSCOPIC X-RAY GUIDANCE: HCPCS | Performed by: RADIOLOGY

## 2023-09-08 PROCEDURE — 77412 RADIATION TX DELIVERY LVL 3: CPT | Performed by: RADIOLOGY

## 2023-09-11 ENCOUNTER — HOSPITAL ENCOUNTER (OUTPATIENT)
Dept: RADIATION ONCOLOGY | Facility: HOSPITAL | Age: 63
Discharge: HOME | End: 2023-09-11
Payer: MEDICARE

## 2023-09-11 ENCOUNTER — RADIATION ONCOLOGY WEEKLY ASSESSMENT (OUTPATIENT)
Dept: RADIATION ONCOLOGY | Facility: HOSPITAL | Age: 63
End: 2023-09-11
Payer: MEDICARE

## 2023-09-11 VITALS
HEIGHT: 60 IN | RESPIRATION RATE: 18 BRPM | WEIGHT: 148.8 LBS | DIASTOLIC BLOOD PRESSURE: 88 MMHG | TEMPERATURE: 98.3 F | OXYGEN SATURATION: 97 % | SYSTOLIC BLOOD PRESSURE: 166 MMHG | HEART RATE: 84 BPM | BODY MASS INDEX: 29.21 KG/M2

## 2023-09-11 DIAGNOSIS — C79.51 MALIGNANT NEOPLASM METASTATIC TO BONE: Primary | ICD-10-CM

## 2023-09-11 DIAGNOSIS — C50.919 MALIGNANT NEOPLASM OF FEMALE BREAST, UNSPECIFIED ESTROGEN RECEPTOR STATUS, UNSPECIFIED LATERALITY, UNSPECIFIED SITE OF BREAST: ICD-10-CM

## 2023-09-11 LAB
RAD ONC ARIA COURSE ID: NORMAL
RAD ONC ARIA COURSE LAST TREATMENT DATE: NORMAL
RAD ONC ARIA COURSE START DATE: NORMAL
RAD ONC ARIA COURSE TREATMENT ELAPSED DAYS: 6
RAD ONC ARIA FIRST TREATMENT DATE: NORMAL
RAD ONC ARIA PLAN FRACTIONS TREATED TO DATE: 5
RAD ONC ARIA PLAN ID: NORMAL
RAD ONC ARIA PLAN PRESCRIBED DOSE PER FRACTION: 3 GY
RAD ONC ARIA PLAN PRIMARY REFERENCE POINT: NORMAL
RAD ONC ARIA PLAN TOTAL FRACTIONS PRESCRIBED: 10
RAD ONC ARIA PLAN TOTAL PRESCRIBED DOSE: 3000 CGY
RAD ONC ARIA REFERENCE POINT DOSAGE GIVEN TO DATE: 15 GY
RAD ONC ARIA REFERENCE POINT ID: NORMAL
RAD ONC ARIA REFERENCE POINT SESSION DOSAGE GIVEN: 3 GY

## 2023-09-11 PROCEDURE — 77387 GUIDANCE FOR RADJ TX DLVR: CPT | Performed by: RADIOLOGY

## 2023-09-11 PROCEDURE — G6002 STEREOSCOPIC X-RAY GUIDANCE: HCPCS | Performed by: RADIOLOGY

## 2023-09-11 PROCEDURE — 77412 RADIATION TX DELIVERY LVL 3: CPT | Performed by: RADIOLOGY

## 2023-09-11 PROCEDURE — FACE2FACE: Performed by: RADIOLOGY

## 2023-09-11 RX ORDER — ONDANSETRON 4 MG/1
4 TABLET, ORALLY DISINTEGRATING ORAL EVERY 8 HOURS PRN
Qty: 20 TABLET | Refills: 2 | Status: SHIPPED | OUTPATIENT
Start: 2023-09-11

## 2023-09-11 NOTE — PROGRESS NOTES
"NAME: Gladys Garrison DATE: 2023   : 1960    MRN: [id number] DIAGNOSIS:   Encounter Diagnoses   Name Primary?    Malignant neoplasm metastatic to bone Yes    Malignant neoplasm of female breast, unspecified estrogen receptor status, unspecified laterality, unspecified site of breast           RADIATION ON TREATMENT VISIT NOTE - GENERAL     Treatment Summary  Treatment Site Ref. ID Energy Dose/Fx (cGy) #Fx Dose Correction (cGy) Total Dose (cGy) Start Date End Date Elapsed Days   T11_L5 T11_L5 10X 300  / 10 0 1,500 2023 6       General:     Review of Systems  [] No new complaints [] Cough   [] Dysphagia  [] Bowel changes   [] Fever/chills  [x] Nausea/vomiting  [] Skin itching/soreness/breakdown  [x] Fatigue, severity: 1 [x] Pain, severity: 9, location: lower back, hips   -Pain pump with Dr. Hunter  Nausea regimen: Ondansetron    Bowel regimen:   Skin regimen:     Subjective:  She states that she is feeling nauseous all of the time, but has not had any vomiting in about 2 days. She was able to eat dinner last night and breakfast this morning with no nausea. She also mentions that she is feeling very fatigued today, but has been working all morning on top of the XRT. No other concerns or complaints.      KPS: 80     Vital Signs: /88   Pulse 84   Temp 98.3 °F (36.8 °C) (Oral)   Resp 18   Ht 152.4 cm (60\")   Wt 67.5 kg (148 lb 12.8 oz)   LMP  (LMP Unknown)   SpO2 97%   BMI 29.06 kg/m²     Weight:   Wt Readings from Last 3 Encounters:   23 67.5 kg (148 lb 12.8 oz)   23 68.4 kg (150 lb 12.8 oz)   23 68.1 kg (150 lb 3.2 oz)       Medication:   Current Outpatient Medications:     acetaminophen (TYLENOL) 650 MG 8 hr tablet, Take 1 tablet by mouth As Needed for Mild Pain. TAKES BETWEEN PAIN MEDS, Disp: , Rfl:     aspirin 81 MG chewable tablet, Chew 1 tablet Daily., Disp: 30 tablet, Rfl: 1    Blood Glucose Monitoring Suppl (Accu-Chek Araceli) device, Use as instructed   " "To test blood sugar bid, Disp: 1 each, Rfl: 0    Calcium Carbonate-Vit D-Min (Calcium 600+D Plus Minerals) 600-400 MG-UNIT chewable tablet, Chew 600 mg 2 (Two) Times a Day., Disp: 60 each, Rfl: 6    Continuous Blood Gluc  (FreeStyle Rajeev 14 Day [place]) device, 1 each Daily., Disp: 1 each, Rfl: 0    Continuous Blood Gluc Sensor (FreeStyle Rajeev 14 Day Sensor) misc, 1 each Daily., Disp: 6 each, Rfl: 3    diclofenac (VOLTAREN) 75 MG EC tablet, Take 1 tablet by mouth 2 (Two) Times a Day., Disp: 10 tablet, Rfl: 0    fluticasone (FLONASE) 50 MCG/ACT nasal spray, 2 sprays into the nostril(s) as directed by provider Daily., Disp: 16 g, Rfl: 1    ibuprofen (ADVIL,MOTRIN) 800 MG tablet, Take 1 tablet by mouth Every 8 (Eight) Hours As Needed for Mild Pain. IN BETWEEN PAIN  MEDS, Disp: 90 tablet, Rfl: 1    insulin NPH-insulin regular (humuLIN 70/30,novoLIN 70/30) (70-30) 100 UNIT/ML injection, Inject 40 Units under the skin into the appropriate area as directed Every Morning., Disp: , Rfl:     Insulin Pen Needle (B-D UF III MINI PEN NEEDLES) 31G X 5 MM misc, Use to inject insulin twice daily   DX: E11.9, Disp: 100 each, Rfl: 0    Insulin Syringe-Needle U-100 28G X 1/2\" 1 ML misc, 1 each 2 (Two) Times a Day., Disp: 100 each, Rfl: 6    lisinopril (PRINIVIL,ZESTRIL) 20 MG tablet, Take 1 tablet by mouth Daily. Takes 4-5 times per week., Disp: , Rfl:     rosuvastatin (Crestor) 20 MG tablet, Take 1 tablet by mouth Every Night., Disp: 90 tablet, Rfl: 0     LABS (Reviewed):  Hematology  WBC   Date Value Ref Range Status   08/18/2023 4.27 3.40 - 10.80 10*3/mm3 Final     RBC   Date Value Ref Range Status   08/18/2023 4.46 3.77 - 5.28 10*6/mm3 Final     Hemoglobin   Date Value Ref Range Status   08/18/2023 12.9 12.0 - 15.9 g/dL Final     Hematocrit   Date Value Ref Range Status   08/18/2023 40.1 34.0 - 46.6 % Final     Platelets   Date Value Ref Range Status   08/18/2023 95 (L) 140 - 450 10*3/mm3 Final     Chemistry   Lab Results "   Component Value Date    GLUCOSE 138 (H) 08/18/2023    BUN 19 08/18/2023    CREATININE 0.65 08/18/2023    EGFRIFNONA 70 12/20/2021    EGFRIFAFRI >60 10/12/2018    BCR 29.2 (H) 08/18/2023    K 4.5 08/18/2023    CO2 27.0 08/18/2023    CALCIUM 9.8 08/18/2023    PROTENTOTREF 7.4 12/14/2020    ALBUMIN 4.4 08/18/2023    LABIL2 0.9 12/14/2020    AST 22 08/18/2023    ALT 16 08/18/2023       Physical Exam:     Neck: [] Lymphadenopathy  Lungs: [x] Clear to auscultation  CVS: [x] Regular rate & rhythm  Skin: [x] stGstrstastdstest:st st1st Comments/Notes:      [x] Review of labs, images, dosimetry, dose delivered, & treatment parameters.   Comments:     [x] Patient treatment setup reviewed.    Comments:     Recommendations:  Zofran has helped; taking pre-treatment--Refill needed  Continue XRT and supportive measures      [x] Continue RT  [] Change RT Plan [] Hold RT, length:      Approved Electronically By:  Christ Laguerre MD, 9/11/2023, 15:48 EDT      Confidentiality of this medical record shall be maintained except when use or disclosure is required or permitted by law, regulation or written authorization by the patient.

## 2023-09-12 ENCOUNTER — OFFICE VISIT (OUTPATIENT)
Dept: ONCOLOGY | Facility: CLINIC | Age: 63
End: 2023-09-12
Payer: MEDICARE

## 2023-09-12 ENCOUNTER — HOSPITAL ENCOUNTER (OUTPATIENT)
Dept: RADIATION ONCOLOGY | Facility: HOSPITAL | Age: 63
Discharge: HOME | End: 2023-09-12
Payer: MEDICARE

## 2023-09-12 ENCOUNTER — SPECIALTY PHARMACY (OUTPATIENT)
Dept: PHARMACY | Facility: HOSPITAL | Age: 63
End: 2023-09-12
Payer: MEDICARE

## 2023-09-12 ENCOUNTER — HOSPITAL ENCOUNTER (OUTPATIENT)
Dept: ONCOLOGY | Facility: HOSPITAL | Age: 63
Discharge: HOME | End: 2023-09-12
Admitting: INTERNAL MEDICINE
Payer: MEDICARE

## 2023-09-12 VITALS
HEIGHT: 60 IN | RESPIRATION RATE: 18 BRPM | WEIGHT: 147 LBS | BODY MASS INDEX: 28.86 KG/M2 | TEMPERATURE: 98.3 F | SYSTOLIC BLOOD PRESSURE: 156 MMHG | HEART RATE: 85 BPM | DIASTOLIC BLOOD PRESSURE: 92 MMHG | OXYGEN SATURATION: 95 %

## 2023-09-12 VITALS
RESPIRATION RATE: 18 BRPM | HEART RATE: 85 BPM | SYSTOLIC BLOOD PRESSURE: 156 MMHG | DIASTOLIC BLOOD PRESSURE: 92 MMHG | HEIGHT: 60 IN | OXYGEN SATURATION: 95 % | WEIGHT: 147.2 LBS | BODY MASS INDEX: 28.9 KG/M2 | TEMPERATURE: 98.3 F

## 2023-09-12 DIAGNOSIS — C79.51 MALIGNANT NEOPLASM METASTATIC TO BONE: Primary | ICD-10-CM

## 2023-09-12 LAB
ALBUMIN SERPL-MCNC: 4.2 G/DL (ref 3.5–5.2)
ALBUMIN/GLOB SERPL: 1.6 G/DL
ALP SERPL-CCNC: 78 U/L (ref 39–117)
ALT SERPL W P-5'-P-CCNC: 13 U/L (ref 1–33)
ANION GAP SERPL CALCULATED.3IONS-SCNC: 8 MMOL/L (ref 5–15)
AST SERPL-CCNC: 19 U/L (ref 1–32)
BASOPHILS # BLD AUTO: 0.01 10*3/MM3 (ref 0–0.2)
BASOPHILS NFR BLD AUTO: 0.3 % (ref 0–1.5)
BILIRUB SERPL-MCNC: 0.4 MG/DL (ref 0–1.2)
BUN SERPL-MCNC: 17 MG/DL (ref 8–23)
BUN/CREAT SERPL: 26.2 (ref 7–25)
CALCIUM SPEC-SCNC: 9.2 MG/DL (ref 8.6–10.5)
CHLORIDE SERPL-SCNC: 102 MMOL/L (ref 98–107)
CO2 SERPL-SCNC: 29 MMOL/L (ref 22–29)
CREAT SERPL-MCNC: 0.65 MG/DL (ref 0.57–1)
DEPRECATED RDW RBC AUTO: 42 FL (ref 37–54)
EGFRCR SERPLBLD CKD-EPI 2021: 100.3 ML/MIN/1.73
EOSINOPHIL # BLD AUTO: 0.06 10*3/MM3 (ref 0–0.4)
EOSINOPHIL NFR BLD AUTO: 1.6 % (ref 0.3–6.2)
ERYTHROCYTE [DISTWIDTH] IN BLOOD BY AUTOMATED COUNT: 13.3 % (ref 12.3–15.4)
GLOBULIN UR ELPH-MCNC: 2.6 GM/DL
GLUCOSE SERPL-MCNC: 100 MG/DL (ref 65–99)
HCT VFR BLD AUTO: 39.4 % (ref 34–46.6)
HGB BLD-MCNC: 12.8 G/DL (ref 12–15.9)
LYMPHOCYTES # BLD AUTO: 0.6 10*3/MM3 (ref 0.7–3.1)
LYMPHOCYTES NFR BLD AUTO: 15.9 % (ref 19.6–45.3)
MCH RBC QN AUTO: 29 PG (ref 26.6–33)
MCHC RBC AUTO-ENTMCNC: 32.5 G/DL (ref 31.5–35.7)
MCV RBC AUTO: 89.1 FL (ref 79–97)
MONOCYTES # BLD AUTO: 0.32 10*3/MM3 (ref 0.1–0.9)
MONOCYTES NFR BLD AUTO: 8.5 % (ref 5–12)
NEUTROPHILS NFR BLD AUTO: 2.78 10*3/MM3 (ref 1.7–7)
NEUTROPHILS NFR BLD AUTO: 73.7 % (ref 42.7–76)
PLATELET # BLD AUTO: 100 10*3/MM3 (ref 140–450)
PMV BLD AUTO: 9.9 FL (ref 6–12)
POTASSIUM SERPL-SCNC: 4.3 MMOL/L (ref 3.5–5.2)
PROT SERPL-MCNC: 6.8 G/DL (ref 6–8.5)
RAD ONC ARIA COURSE ID: NORMAL
RAD ONC ARIA COURSE LAST TREATMENT DATE: NORMAL
RAD ONC ARIA COURSE START DATE: NORMAL
RAD ONC ARIA COURSE TREATMENT ELAPSED DAYS: 7
RAD ONC ARIA FIRST TREATMENT DATE: NORMAL
RAD ONC ARIA PLAN FRACTIONS TREATED TO DATE: 6
RAD ONC ARIA PLAN ID: NORMAL
RAD ONC ARIA PLAN PRESCRIBED DOSE PER FRACTION: 3 GY
RAD ONC ARIA PLAN PRIMARY REFERENCE POINT: NORMAL
RAD ONC ARIA PLAN TOTAL FRACTIONS PRESCRIBED: 10
RAD ONC ARIA PLAN TOTAL PRESCRIBED DOSE: 3000 CGY
RAD ONC ARIA REFERENCE POINT DOSAGE GIVEN TO DATE: 18 GY
RAD ONC ARIA REFERENCE POINT ID: NORMAL
RAD ONC ARIA REFERENCE POINT SESSION DOSAGE GIVEN: 3 GY
RBC # BLD AUTO: 4.42 10*6/MM3 (ref 3.77–5.28)
SODIUM SERPL-SCNC: 139 MMOL/L (ref 136–145)
WBC NRBC COR # BLD: 3.77 10*3/MM3 (ref 3.4–10.8)

## 2023-09-12 PROCEDURE — 96402 CHEMO HORMON ANTINEOPL SQ/IM: CPT

## 2023-09-12 PROCEDURE — 77387 GUIDANCE FOR RADJ TX DLVR: CPT | Performed by: RADIOLOGY

## 2023-09-12 PROCEDURE — 77412 RADIATION TX DELIVERY LVL 3: CPT | Performed by: RADIOLOGY

## 2023-09-12 PROCEDURE — 80053 COMPREHEN METABOLIC PANEL: CPT | Performed by: INTERNAL MEDICINE

## 2023-09-12 PROCEDURE — G6002 STEREOSCOPIC X-RAY GUIDANCE: HCPCS | Performed by: RADIOLOGY

## 2023-09-12 PROCEDURE — 77427 RADIATION TX MANAGEMENT X5: CPT | Performed by: RADIOLOGY

## 2023-09-12 PROCEDURE — 25010000002 FULVESTRANT PER 25 MG: Performed by: NURSE PRACTITIONER

## 2023-09-12 PROCEDURE — 85025 COMPLETE CBC W/AUTO DIFF WBC: CPT | Performed by: INTERNAL MEDICINE

## 2023-09-12 RX ORDER — LAMOTRIGINE 25 MG/1
500 TABLET ORAL ONCE
Status: COMPLETED | OUTPATIENT
Start: 2023-09-12 | End: 2023-09-12

## 2023-09-12 RX ORDER — EVEROLIMUS 10 MG/1
TABLET ORAL DAILY
COMMUNITY
End: 2023-09-18 | Stop reason: SDUPTHER

## 2023-09-12 RX ORDER — LAMOTRIGINE 25 MG/1
500 TABLET ORAL ONCE
Status: CANCELLED | OUTPATIENT
Start: 2023-09-12

## 2023-09-12 RX ADMIN — FULVESTRANT 500 MG: 50 INJECTION, SOLUTION INTRAMUSCULAR at 14:54

## 2023-09-12 NOTE — PATIENT INSTRUCTIONS
Aromasin - Exemestane Tablets  What is this medication?  EXEMESTANE (ex e MES tane) treats breast cancer. It works by decreasing the amount of estrogen hormone your body makes, which slows or stops breast cancer cells from spreading or growing.    This medicine may be used for other purposes; ask your health care provider or pharmacist if you have questions.    COMMON BRAND NAME(S): Aromasin    What should I tell my care team before I take this medication?  They need to know if you have any of these conditions:    An unusual or allergic reaction to exemestane, other medications, foods, dyes, or preservatives  Pregnant or trying to get pregnant  Breastfeeding  How should I use this medication?  Take this medication by mouth with a glass of water. Take it as directed on the prescription label at the same time every day. Take it after a meal. Keep taking it unless your care team tells you to stop.    Talk to your care team about the use of this medication in children. Special care may be needed.    Overdosage: If you think you have taken too much of this medicine contact a poison control center or emergency room at once.    NOTE: This medicine is only for you. Do not share this medicine with others.    What if I miss a dose?  If you miss a dose, skip it. Take your next dose at the normal time. Do not take extra or 2 doses at the same time to make up for the missed dose.    What may interact with this medication?  Certain medications for seizures, such as carbamazepine, phenobarbital, phenytoin  Rifampin  Miguel Angel's wort  This list may not describe all possible interactions. Give your health care provider a list of all the medicines, herbs, non-prescription drugs, or dietary supplements you use. Also tell them if you smoke, drink alcohol, or use illegal drugs. Some items may interact with your medicine.    What should I watch for while using this medication?  Visit your care team for regular checks on your  progress.    If you experience hot flashes or sweating while taking this medication, avoid alcohol, smoking, and caffeine. This may help to decrease these side effects.    Using this medication for a long time may weaken your bones. The risk of bone fractures may be increased. Talk to your care team about your bone health.    Talk to your care team if you may be pregnant. Serious birth defects can occur if you take this medication during pregnancy and for 1 month after the last dose. You will need a negative pregnancy test before starting this medication. Contraception is recommended while taking this medication and for 1 month after the last dose. Your care team can help you find the option that works for you.    Do not breastfeed while taking this medication and for 1 month after the last dose.    This medication may cause infertility. Talk to your care team if you are concerned about your fertility.    What side effects may I notice from receiving this medication?  Side effects that you should report to your care team as soon as possible:    Allergic reactions--skin rash, itching, hives, swelling of the face, lips, tongue, or throat  Side effects that usually do not require medical attention (report to your care team if they continue or are bothersome):    Fatigue  Headache  Hot flashes  Joint pain  Nausea  Sweating  Trouble sleeping  This list may not describe all possible side effects. Call your doctor for medical advice about side effects. You may report side effects to FDA at 4-959-FDA-0027.    Where should I keep my medication?  Keep out of the reach of children and pets.    Store at room temperature between 20 and 25 degrees C (68 and 77 degrees F). Get rid of any unused medication after the expiration date.    To get rid of medications that are no longer needed or have :    Take the medication to a medication take-back program. Check with your pharmacy or law enforcement to find a location.  If you  cannot return the medication, ask your pharmacist or care team how to get rid of this medication safely.  NOTE: This sheet is a summary. It may not cover all possible information. If you have questions about this medicine, talk to your doctor, pharmacist, or health care provider.    © 2023 [employer] (2023-05-08 00:00:00)    Additional Information From [employer] About Aromasin  Self-Care Tips:  Take Exemestane after a meal; at about the same time every day.  Exemestane causes little nausea. But if you should experience nausea, take anti-nausea medications are prescribed by your doctor, and eat small frequent meals. Such on lozenges and chewing gum may also help.  In general, drinking alcoholic beverages should be kept to a minimum or avoided completely. You should discuss this with your doctor.  If you are experiencing hot flashes, wearing light clothing, staying in a cool environment, and putting cool cloths on your head may reduce symptoms. Consult your health care provider if these worsen, or become intolerable.  Acetaminophen or ibuprofen may help relieve discomfort generalized aches and pains. However, be sure to talk with your doctor before taking it.  Get plenty of rest.  Maintain good nutrition.  If you experience symptoms or side effects, be sure to discuss them with your health care team. They can prescribe medications and/or offer other suggestions that are effective in managing such problems.    When to contact your doctor or health care provider:  Contact your health care provider immediately, day or night, if you should experience any of the following symptoms:    Shortness of breath or difficulty breathing  Having thoughts or feeling like you may want to harm yourself or others  The following symptoms require medical attention, but are not an emergency. Contact your health care provider within 24 hours of noticing any of the following:    Vaginal bleeding (similar to a period)  Always  inform your health care provider if you experience any unusual symptoms.

## 2023-09-12 NOTE — PROGRESS NOTES
Hematology/Oncology Outpatient Follow Up    PATIENT NAME:Gladys Garrison  :1960  MRN: [id number]  PRIMARY CARE PHYSICIAN: Chirag Robles DO  REFERRING PHYSICIAN: No ref. provider found    Chief Complaint   Patient presents with    Follow-up     Malignant neoplasm of female breast,        HISTORY OF PRESENT ILLNESS:     This is a 63-year-old female who multiple comorbidities including congenital abnormalities such as hypoplastic kidney, tetralogy of Fallot, coarctation of the aorta and congenital VSD status post repair.  Patient developed syncopal episode and for that reason she had she had a CT scan of the chest which showed mass in the left breast.  She had diagnostic mammogram and ultrasound which showed a 2 cm spiculated mass in the left breast at the 1 o'clock position 6 cm from the nipple.  Biopsy of the left breast mass revealed invasive moderately differentiated carcinoma ER/IA positive and HER-2/bishop negative.  Patient also had an ultrasound of the axilla which was concerning for an abnormal left axillary lymph node with cortical thickening. She apparently had an FNA of the left axillary nodule which was positive for malignancy.  On 2017 patient underwent left modified radical mastectomy, right prophylactic mastectomy and immediate breast reconstruction. She also underwent right prophylactic mastectomy.  We have had her on records suggest that patient did have multifocal disease with pT2 pN1 aM0.  The largest focus measured 3.5 cm.  2 of 11 lymph nodes were positive for metastatic disease some with extracapsular extension.  Notes that patient did receive Arimidex neoadjuvant  from 2017 to 2018 prior to her bilateral mastectomies.    Review of her note suggest that she developed cough which she attributed to anastrozole and patient was then placed on tamoxifen.  She had MammaPrint testing which returned with low risk for relapse.    Her postop course was also complicated by  left chest wall abscess which resulted in I&D and removal of the left tissue expander. She was referred for radiation treatment boards ultimately declined.    Patient was then placed on tamoxifen in April 2018 which she stopped after less than a month due to nausea and vomiting.  Patient in the interim also was diagnosed with chronic hepatitis C was seen by the hepatologist.  Tamoxifen was dose reduced to 10 mg daily with the goal to increase to 20 mg daily.      Patient has relocated to [facility].  She has transitioned her care to us now.  She is currently not on any antiestrogen therapy.     Her bilateral mastectomy specimen are available for review    1/29/2021 patient had bone density which showed osteoporosis  Patient was seen by neurosurgery and had a bone scan done in March 2021 which showed subtle activity in the inferior L4 vertebral body appears to correlate with new area of sclerosis on CT of the abdomen and pelvis.  There is also subtle activity with possible new lesion at the posterior left sacrum.  These findings were concerning for metastatic disease.  PET CT scan was recommended to further evaluate.  4/8/2021 patient had a PET CT scan which showed evidence of disease in the neck chest abdomen and pelvis.  But she has a 1.1 cm mixed lytic/sclerotic hypermetabolic lesion within the left hemisacrum consistent with metastatic disease.  There is also accumulation within the inferior endplate of the L4 vertebral body thought to correspond to 1.2 centimeters sclerotic lesion consistent with metastatic disease.  There is a tiny hypermetabolic focus within the L3, T11.  Suspicious for also early metastatic disease.  4/26/2021 patient underwent CT-guided biopsy of sacral lesion, pathology was consistent with metastatic breast cancer ER and AL positive HER-2/bishop was negative.  7/6/21 CT new sclerosis within the right 12th rib posteriorly which could represent metastatic lesion or a healed or healing  fracture, new sclerosis within the right 12th rib posteriorly which         could represent metastatic lesion,new sclerosis within the right T8 transverse process worrisome for metastatic        disease progression.  7/7/21 complaints of 8/10 back pain and 8/10 LUQ pain. Referral to radiation for questionable mets on CT chest.  7/26/2021 patient had CT scan of the head with contrast with did not show any evidence of metastatic disease.  CT scan of the chest abdomen and pelvis did not show any evidence of progressive disease.  7/26/2021 patient had CEA level which shows declining values CA 15-3 has decreased to 62 from 84  11/3/2021: Patient had CT scan of the chest, abdomen and pelvis. In the chest there were no suspicious pulmonary nodules. There is no clear evidence of progressive disease  12/13/2021 patient had a bone scan which showed uptake along the posterior right ribs consistent with rib fractures.  There is mild uptake in the L4 vertebral body corresponding to the sclerotic lesion seen on previous CT scan.  This was likely due to metastatic disease  10/6/2022: Patient has been lost to follow-up.  She stopped Ibrance due to pulmonary issues.  Apparently patient went to the emergency room and was told that Ibrance was causing lung damage.  October 6, 2022: She has a increasing CA 15-3 and CA 27.29 as well as CEA level.  10/19/2022: Patient had guardant 360 testing done which was negative  10/31/2022: Patient had bone scan which basically showed evidence for mild progression of multifocal osseous metastatic disease.  There appears to be new activity corresponding to the thoracic cervical spine, left scapula, left sacrum and left proximal femur.  CT scan of the chest otherwise is stable.  There is a nonobstructing stone on the left kidney.        Past Medical History:   Diagnosis Date    Allergic     Bone cancer     METASTATIC BONE    Bradycardia     SECONDARY TO ABLATION    Breast cancer 2017    mets to lymph  nodes; did not do radiation    Cancer of unknown origin     Compression fx, thoracic spine, open, initial encounter     Coronary artery disease     COVID-19 2021    Diabetes mellitus     Heart disease, unspecified     Hepatitis C     RESOLVED WITH MEDICATION    Hyperlipidemia     Hypertension     Obesity (BMI 30-39.9) 2021    Rib fracture     Per patient    Sleep apnea     no machine    Tachycardia     ATRIAL    Type 2 diabetes mellitus 2017       Past Surgical History:   Procedure Laterality Date    BACK SURGERY      neck X 2    BREAST RECONSTRUCTION Bilateral     CARDIAC ABLATION       atrial tachycardia x 5 ablations     CARDIAC CATHETERIZATION      CARDIAC SURGERY      stent placed in aorta    CARDIAC SURGERY      6 surgeries as baby     CERVICAL FUSION ANTERIOR WITH ARTIFICIAL DISCECTOMY IMPLANTATION N/A 2020    Procedure: C4 VERTEBRECTOMY AND ANTERIOR CERIVCAL DISCECTOMY WITH FUSION OF CERVICAL THREE THROUGH FIVE WITH REMOVAL OF HARDWARE C5-C6;  Surgeon: Mark Kaba MD;  Location: [facility] MAIN OR;  Service: Neurosurgery;  Laterality: N/A;     SECTION      x2    COLONOSCOPY N/A 10/23/2020    Procedure: COLONOSCOPY WITH POLYPECTOMY X6;  Surgeon: Stanley Bourne MD;  Location: [facility] ENDOSCOPY;  Service: Gastroenterology;  Laterality: N/A;  POLYPS, INTERNAL HEMORRHOIDS    ENDOMETRIAL ABLATION      REMOVAL SCAR TISSUE UTERINE    ENDOSCOPY N/A 10/23/2020    Procedure: ESOPHAGOGASTRODUODENOSCOPY WITH BIOPSY X 1 AREA;  Surgeon: Stanley Bourne MD;  Location: [facility] ENDOSCOPY;  Service: Gastroenterology;  Laterality: N/A;  GASTRITIS, ESOPHAGITIS, HIATAL HERNIA    KNEE SURGERY      MASTECTOMY Bilateral     NECK SURGERY      PACEMAKER IMPLANTATION      PAIN PUMP INSERTION/REVISION N/A 2022    Procedure: PAIN PUMP INSERTION AND INTRATHECAL CATHETER PLACEMENT;  Surgeon: Chuck Hunter MD;  Location: [facility] MAIN OR;  Service: Pain Management;   "Laterality: N/A;         Current Outpatient Medications:     acetaminophen (TYLENOL) 650 MG 8 hr tablet, Take 1 tablet by mouth As Needed for Mild Pain. TAKES BETWEEN PAIN MEDS, Disp: , Rfl:     aspirin 81 MG chewable tablet, Chew 1 tablet Daily., Disp: 30 tablet, Rfl: 1    Blood Glucose Monitoring Suppl (Accu-Chek Araceli) device, Use as instructed   To test blood sugar bid, Disp: 1 each, Rfl: 0    Calcium Carbonate-Vit D-Min (Calcium 600+D Plus Minerals) 600-400 MG-UNIT chewable tablet, Chew 600 mg 2 (Two) Times a Day., Disp: 60 each, Rfl: 6    Continuous Blood Gluc  (FreeStyle Rajeev 14 Day Agenda) device, 1 each Daily., Disp: 1 each, Rfl: 0    Continuous Blood Gluc Sensor (FreeStyle Rajeev 14 Day Sensor) misc, 1 each Daily., Disp: 6 each, Rfl: 3    diclofenac (VOLTAREN) 75 MG EC tablet, Take 1 tablet by mouth 2 (Two) Times a Day., Disp: 10 tablet, Rfl: 0    fluticasone (FLONASE) 50 MCG/ACT nasal spray, 2 sprays into the nostril(s) as directed by provider Daily., Disp: 16 g, Rfl: 1    ibuprofen (ADVIL,MOTRIN) 800 MG tablet, Take 1 tablet by mouth Every 8 (Eight) Hours As Needed for Mild Pain. IN BETWEEN PAIN  MEDS, Disp: 90 tablet, Rfl: 1    insulin NPH-insulin regular (humuLIN 70/30,novoLIN 70/30) (70-30) 100 UNIT/ML injection, Inject 40 Units under the skin into the appropriate area as directed Every Morning., Disp: , Rfl:     Insulin Pen Needle (B-D UF III MINI PEN NEEDLES) 31G X 5 MM misc, Use to inject insulin twice daily   DX: E11.9, Disp: 100 each, Rfl: 0    Insulin Syringe-Needle U-100 28G X 1/2\" 1 ML misc, 1 each 2 (Two) Times a Day., Disp: 100 each, Rfl: 6    lisinopril (PRINIVIL,ZESTRIL) 20 MG tablet, Take 1 tablet by mouth Daily. Takes 4-5 times per week., Disp: , Rfl:     ondansetron ODT (ZOFRAN-ODT) 4 MG disintegrating tablet, Place 1 tablet on the tongue Every 8 (Eight) Hours As Needed for Nausea or Vomiting., Disp: 20 tablet, Rfl: 2    rosuvastatin (Crestor) 20 MG tablet, Take 1 tablet by " mouth Every Night., Disp: 90 tablet, Rfl: 0  No current facility-administered medications for this visit.    Allergies   Allergen Reactions    Promethazine Other (See Comments)     Hyperactive mean    Tape Other (See Comments)     .blisters         Family History   Problem Relation Age of Onset    Heart disease Mother     Stroke Mother     Lung cancer Mother     Aneurysm Father     Diabetes Sister     No Known Problems Brother     No Known Problems Brother     Diabetes type I Half-Sister     Thyroid cancer Half-Sister     Cancer Maternal Aunt     Heart attack Sister     Thyroid disease Sister     No Known Problems Sister        Cancer-related family history includes Cancer in her maternal aunt; Lung cancer in her mother; Thyroid cancer in her half-sister.    Social History     Tobacco Use    Smoking status: Never     Passive exposure: Never    Smokeless tobacco: Never   Vaping Use    Vaping Use: Never used   Substance Use Topics    Alcohol use: Yes     Alcohol/week: 1.0 standard drink     Types: 1 Glasses of wine per week     Comment: mixed drink once a week    Drug use: Not Currently       I have reviewed and confirmed the accuracy of the patient's history: Chief complaint, HPI, ROS, and Subjective as entered by the MA/LPN/RN. Марияmiles Nunez MD 09/12/23        SUBJECTIVE:    Patient remains on Faslodex once a month.  Her recent bone scan, CT scans chest abdomen and pelvis shows progressive disease in the L1 vertebral body with compression fracture.  She is currently undergoing radiation to the L spine      Gladys Garrison reports a pain score of 0.  Given her pain assessment as noted, treatment options were discussed and the following options were decided upon as a follow-up plan to address the patient's pain: continuation of current treatment plan for pain and referral to specialist for assistance in pain treatment guidance.     REVIEW OF SYSTEMS:     Review of Systems   Constitutional:  Negative for  "chills and fever.   HENT:  Negative for ear pain, mouth sores, nosebleeds and sore throat.    Eyes:  Negative for photophobia and visual disturbance.   Respiratory:  Negative for wheezing and stridor.    Cardiovascular:  Negative for chest pain and palpitations.   Gastrointestinal:  Negative for abdominal pain, diarrhea, nausea and vomiting.   Endocrine: Negative for cold intolerance and heat intolerance.   Genitourinary:  Negative for dysuria and hematuria.   Musculoskeletal:  Negative for joint swelling and neck stiffness.   Skin:  Negative for color change and rash.   Neurological:  Negative for seizures and syncope.   Hematological:  Negative for adenopathy.        No obvious bleeding   Psychiatric/Behavioral:  Negative for agitation, confusion and hallucinations.        OBJECTIVE:    Vitals:    09/12/23 1423   BP: 156/92   Pulse: 85   Resp: 18   Temp: 98.3 °F (36.8 °C)   TempSrc: Infrared   SpO2: 95%   Weight: 66.7 kg (147 lb)   Height: 152.4 cm (60\")   PainSc: 0-No pain     Body mass index is 28.71 kg/m².    ECOG    (1) Restricted in physically strenuous activity, ambulatory and able to do work of light nature    Physical Exam  Vitals and nursing note reviewed.   Constitutional:       General: She is not in acute distress.     Appearance: Normal appearance. She is not diaphoretic.   HENT:      Head: Normocephalic and atraumatic.      Mouth/Throat:      Mouth: Mucous membranes are moist.      Pharynx: Oropharynx is clear.   Eyes:      General: No scleral icterus.        Right eye: No discharge.         Left eye: No discharge.      Extraocular Movements: Extraocular movements intact.      Conjunctiva/sclera: Conjunctivae normal.   Neck:      Thyroid: No thyromegaly.   Cardiovascular:      Rate and Rhythm: Normal rate and regular rhythm.      Pulses: Normal pulses.      Heart sounds: Normal heart sounds.     No friction rub. No gallop.   Pulmonary:      Effort: Pulmonary effort is normal. No respiratory distress. "      Breath sounds: Normal breath sounds. No stridor. No wheezing.   Abdominal:      General: Bowel sounds are normal. There is distension.      Palpations: Abdomen is soft. There is no mass.      Tenderness: There is abdominal tenderness. There is guarding (Left upper abdomen). There is no rebound.   Musculoskeletal:         General: No tenderness. Normal range of motion.      Cervical back: Normal range of motion and neck supple.      Right lower leg: No edema.      Left lower leg: No edema.      Comments: Neck pain.  Patient has a neck collar.   Lymphadenopathy:      Cervical: No cervical adenopathy.   Skin:     General: Skin is warm and dry.      Capillary Refill: Capillary refill takes less than 2 seconds.      Findings: No bruising, erythema or rash.   Neurological:      Mental Status: She is alert and oriented to person, place, and time.      Cranial Nerves: No cranial nerve deficit.      Sensory: No sensory deficit.      Motor: No abnormal muscle tone.   Psychiatric:         Mood and Affect: Mood normal.         Behavior: Behavior normal.         Thought Content: Thought content normal.         Judgment: Judgment normal.     Right foot noted but no redness or increase in warmth    I have reexamined the patient and the results are consistent with the previously documented exam. Мария Lian Nunez MD      RECENT LABS    WBC   Date Value Ref Range Status   09/12/2023 3.77 3.40 - 10.80 10*3/mm3 Final     RBC   Date Value Ref Range Status   09/12/2023 4.42 3.77 - 5.28 10*6/mm3 Final     Hemoglobin   Date Value Ref Range Status   09/12/2023 12.8 12.0 - 15.9 g/dL Final     Hematocrit   Date Value Ref Range Status   09/12/2023 39.4 34.0 - 46.6 % Final     MCV   Date Value Ref Range Status   09/12/2023 89.1 79.0 - 97.0 fL Final     MCH   Date Value Ref Range Status   09/12/2023 29.0 26.6 - 33.0 pg Final     MCHC   Date Value Ref Range Status   09/12/2023 32.5 31.5 - 35.7 g/dL Final     RDW   Date Value Ref Range  Status   09/12/2023 13.3 12.3 - 15.4 % Final     RDW-SD   Date Value Ref Range Status   09/12/2023 42.0 37.0 - 54.0 fl Final     MPV   Date Value Ref Range Status   09/12/2023 9.9 6.0 - 12.0 fL Final     Platelets   Date Value Ref Range Status   09/12/2023 100 (L) 140 - 450 10*3/mm3 Final     Neutrophil %   Date Value Ref Range Status   09/12/2023 73.7 42.7 - 76.0 % Final     Lymphocyte %   Date Value Ref Range Status   09/12/2023 15.9 (L) 19.6 - 45.3 % Final     Monocyte %   Date Value Ref Range Status   09/12/2023 8.5 5.0 - 12.0 % Final     Eosinophil %   Date Value Ref Range Status   09/12/2023 1.6 0.3 - 6.2 % Final     Basophil %   Date Value Ref Range Status   09/12/2023 0.3 0.0 - 1.5 % Final     Immature Grans %   Date Value Ref Range Status   07/20/2020 0.7 (H) 0.0 - 0.5 % Final     Neutrophils, Absolute   Date Value Ref Range Status   09/12/2023 2.78 1.70 - 7.00 10*3/mm3 Final     Lymphocytes, Absolute   Date Value Ref Range Status   09/12/2023 0.60 (L) 0.70 - 3.10 10*3/mm3 Final     Monocytes, Absolute   Date Value Ref Range Status   09/12/2023 0.32 0.10 - 0.90 10*3/mm3 Final     Eosinophils, Absolute   Date Value Ref Range Status   09/12/2023 0.06 0.00 - 0.40 10*3/mm3 Final     Basophils, Absolute   Date Value Ref Range Status   09/12/2023 0.01 0.00 - 0.20 10*3/mm3 Final     Immature Grans, Absolute   Date Value Ref Range Status   07/20/2020 0.05 0.00 - 0.05 10*3/mm3 Final     nRBC   Date Value Ref Range Status   05/24/2023 0.0 0.0 - 0.2 /100 WBC Final       Lab Results   Component Value Date    GLUCOSE 138 (H) 08/18/2023    BUN 19 08/18/2023    CREATININE 0.65 08/18/2023    EGFRIFNONA 70 12/20/2021    EGFRIFAFRI >60 10/12/2018    BCR 29.2 (H) 08/18/2023    K 4.5 08/18/2023    CO2 27.0 08/18/2023    CALCIUM 9.8 08/18/2023    PROTENTOTREF 7.4 12/14/2020    ALBUMIN 4.4 08/18/2023    LABIL2 0.9 12/14/2020    AST 22 08/18/2023    ALT 16 08/18/2023       ASSESSMENT:    Metastatic breast cancer presenting as 1.1  cm mixed lytic/sclerotic hypermetabolic lesion in the left hemisacrum suspicious for metastatic disease 1.2 cm sclerotic lesion on L4, small L3 lesion and T11 lesion.  Tumor is ER positive, AK positive and HER-2/bishop negative.  Patient was prescribed combination of Ibrance and Arimidex.  Continued Ibrance due to pulmonary toxicity.  She was then placed on single agent Arimidex.  Upon progression on Arimidex was switched to Faslodex.  She developed L1 vertebral body progressive disease on Faslodex for that reason we will discontinue Faslodex and begin combination of Aromasin and Afinitor.  I reviewed the benefits, the side effects in detail.  [id number] does not show any actionable mutation.  She does not have any soft tissue for us to biopsy for additional NGS testing  Bone metastasis:.  Biphosphonate's has been recommended patient has not been able to have due to ongoing dental issues  Right foot swelling: Doppler study was negative  Medical noncompliance  Pancytopenia secondary to Ibrance: Improved off Ibrance  L1 vertebral body involvement.  Currently undergoing XRT.  Patient is also established with Dr. Ruff.  MRI of the spine has been scheduled for October 2023  Pain management  ypT2 N1 aM0 status post left modified radical mastectomy with lymph node dissection and right prophylactic mastectomy in 2017 performed at [facility].  ER positive, AK positive and HER-2/bishop negative.  Status post bilateral chest wall reconstruction.  According to patient, she tolerated Arimidex very well except that she also had osteoporosis therefore Arimidex was discontinued at that time  Intolerance of tamoxifen in the past  Osteoporosis: We will transition to Xgeva since she has bone metastases  Status post neoadjuvant Arimidex prior to bilateral mastectomies  Thrombocytopenia: Work-up was - December 2020  Neck pain status post cervical spine surgery: Resolved  Complex cardiac medical history including  tetralogy of Fallot, coarctation of aorta, VSD status post repair.  Status post stent placement for coarctation of aorta  Personal history and strong family history of breast cancer in multiple in the relatives on paternal side of the family including 4 paternal aunts in their 30s and 40s and 2 maternal aunts at age of 20s and 50s.  There is concern for possible hereditary breast cancer syndrome.  Patient may be  interested in pursuing cancer genetics for her management.  Assessment has been reviewed and updated          Discussion    Patient has metastatic breast cancer estrogen and progesterone dependent and HER-2/bishop negative.  She is currently on Arimidex.  We will add Ibrance.  We will also discontinue Prolia and begin Xgeva to help reduce skeletal events.           Plans:     Reviewed her recent bone scan, CT scan chest abdomen and pelvis  Discontinue Faslodex due to L spine vertebral body with progression  Begin combination of Aromasin 25 mg p.o. daily and Afinitor 10 mg p.o. daily.  Reviewed the benefits and side effects in detail with patient  She will be given information today on Aromasin and Afinitor  She will meet with the [facility] pharmacist next week  Checked tumor markers for CEA CA 15-3 and CA 27.29 reviewed  She will complete palliative XRT to her lumbar spine  Follow-up with Dr. Ruff with neurosurgical services  Guardant 360 for NGS testing was negative for any actionable mutations  Follow-up with pain management with Dr. Hunter  Guardian 360 does not show any actionable mutation.  Would consider soft tissue biopsy at time of progression for additional NGS testing.  Reviewed with patient  Referred to pulmonary for her dyspnea: Dr. Julio.  Appointment is pending  Follow-up with pain management for ongoing pain issues  Follow-up with /counselor   Reviewed work-up for thrombocytopenia which was negative  Reviewed her bone density which showed osteoporosis  Schedule Xgeva 120 mg subcu  monthly once her dental procedures are completed  All questions answered  She will follow-up 4 weeks  All questions answered            Patient verbalized understanding and is in agreement of the above plan.              I spent 40 total minutes, face-to-face, caring for Gladys today. 90% of this time involved counseling and/or coordination of care as documented within this note.

## 2023-09-12 NOTE — PROGRESS NOTES
Oral Chemotherapy - New Referral    Received a referral from Dr. Nunez    Treatment Plan: Afinitor (everolimus)  Start date of treatment planned for: As soon as oral specialty medication is available. (Week of 9/18/23)  Indication: metastatic breast cancer  Relevant past treatments: Ibrance + Arimidex, then single agent Arimidex, then faslodex  Is the therapy appropriate based on treatment guidelines and FDA labeling?: Yes  Therapeutic Goals: Continue treatment until progression or intolerable toxicity  Patient can self-administer oral medications: Yes    Drug-Drug Interactions: The current medication list was reviewed and there are some relevant drug-drug interactions with the oral specialty medication that will be discussed during education, including: Cat C DI - everolimus and lisinopril (Everolimus may enhance the adverse/toxic effect of Angiotensin-Converting Enzyme Inhibitors)  Medication Allergies: The patient has no relevant allergies as it relates to their oral specialty medication  Review of Labs/Dose Adjustments: The patient's most recent labs were reviewed and all are WNL to start treatment at this dose.     A prescription was released to be determined specialty pharmacy for   Drug: Afinitor (everolimus)  Strength: 10 mg  Directions: Take 1 tablet by mouth daily  Quantity: 30  Refills: 5    Pharmacy education is scheduled for 9/18/23.    Verona Cowart Pharm.D.    9/12/2023  16:53 EDT

## 2023-09-12 NOTE — PROGRESS NOTES
Pt here for C6D1 Faslodex and MD visit.  Blood drawn peripherally and sent to lab for processing.  Faslodex administered per care plan. One injection given in left buttock of 250mg/5mL, the other given in the Right buttock of 250mg/5mL, for a total dose of 500mg/5mL. Pt denies having any issues and tolerated injection well. Pt taken to MD side for follow up visit w/ Dr. Nunez.

## 2023-09-13 ENCOUNTER — DOCUMENTATION (OUTPATIENT)
Dept: PHARMACY | Facility: HOSPITAL | Age: 63
End: 2023-09-13
Payer: MEDICARE

## 2023-09-13 ENCOUNTER — HOSPITAL ENCOUNTER (OUTPATIENT)
Dept: RADIATION ONCOLOGY | Facility: HOSPITAL | Age: 63
Discharge: HOME | End: 2023-09-13
Payer: MEDICARE

## 2023-09-13 LAB
RAD ONC ARIA COURSE ID: NORMAL
RAD ONC ARIA COURSE LAST TREATMENT DATE: NORMAL
RAD ONC ARIA COURSE START DATE: NORMAL
RAD ONC ARIA COURSE TREATMENT ELAPSED DAYS: 8
RAD ONC ARIA FIRST TREATMENT DATE: NORMAL
RAD ONC ARIA PLAN FRACTIONS TREATED TO DATE: 7
RAD ONC ARIA PLAN ID: NORMAL
RAD ONC ARIA PLAN PRESCRIBED DOSE PER FRACTION: 3 GY
RAD ONC ARIA PLAN PRIMARY REFERENCE POINT: NORMAL
RAD ONC ARIA PLAN TOTAL FRACTIONS PRESCRIBED: 10
RAD ONC ARIA PLAN TOTAL PRESCRIBED DOSE: 3000 CGY
RAD ONC ARIA REFERENCE POINT DOSAGE GIVEN TO DATE: 21 GY
RAD ONC ARIA REFERENCE POINT ID: NORMAL
RAD ONC ARIA REFERENCE POINT SESSION DOSAGE GIVEN: 3 GY

## 2023-09-13 PROCEDURE — 77412 RADIATION TX DELIVERY LVL 3: CPT | Performed by: RADIOLOGY

## 2023-09-13 PROCEDURE — 77387 GUIDANCE FOR RADJ TX DLVR: CPT | Performed by: RADIOLOGY

## 2023-09-13 PROCEDURE — G6002 STEREOSCOPIC X-RAY GUIDANCE: HCPCS | Performed by: RADIOLOGY

## 2023-09-13 NOTE — PROGRESS NOTES
Specialty Pharmacy Note     Prior Authorization was done thru covermymeds. It was approved for Everolimus (Afinitor)10mg. Coverage is from 9/13/23 to 3/11/24. Test claim in [facility] returned a $1,074.84 co-pay for Everolimus (Afinitor)10mg/30-days supply (generic). Test claim in [facility] returned a $45.30 co-pay for Exemestane (Aromasin) 25mg/30-day supply. [place] are currently closed for Darangela diagnosis. [facility] has external assistance for the afinitor. Take home medication is covered at $0 thru [facility].    Gregorio Grimm - CARE COORDINATOR  9/13/2023  14:17 EDT

## 2023-09-14 ENCOUNTER — HOSPITAL ENCOUNTER (OUTPATIENT)
Dept: RADIATION ONCOLOGY | Facility: HOSPITAL | Age: 63
Discharge: HOME | End: 2023-09-14
Payer: MEDICARE

## 2023-09-14 LAB
RAD ONC ARIA COURSE ID: NORMAL
RAD ONC ARIA COURSE LAST TREATMENT DATE: NORMAL
RAD ONC ARIA COURSE START DATE: NORMAL
RAD ONC ARIA COURSE TREATMENT ELAPSED DAYS: 9
RAD ONC ARIA FIRST TREATMENT DATE: NORMAL
RAD ONC ARIA PLAN FRACTIONS TREATED TO DATE: 8
RAD ONC ARIA PLAN ID: NORMAL
RAD ONC ARIA PLAN PRESCRIBED DOSE PER FRACTION: 3 GY
RAD ONC ARIA PLAN PRIMARY REFERENCE POINT: NORMAL
RAD ONC ARIA PLAN TOTAL FRACTIONS PRESCRIBED: 10
RAD ONC ARIA PLAN TOTAL PRESCRIBED DOSE: 3000 CGY
RAD ONC ARIA REFERENCE POINT DOSAGE GIVEN TO DATE: 24 GY
RAD ONC ARIA REFERENCE POINT ID: NORMAL
RAD ONC ARIA REFERENCE POINT SESSION DOSAGE GIVEN: 3 GY

## 2023-09-14 PROCEDURE — 77412 RADIATION TX DELIVERY LVL 3: CPT | Performed by: RADIOLOGY

## 2023-09-14 PROCEDURE — 77336 RADIATION PHYSICS CONSULT: CPT | Performed by: RADIOLOGY

## 2023-09-14 PROCEDURE — 77387 GUIDANCE FOR RADJ TX DLVR: CPT | Performed by: RADIOLOGY

## 2023-09-14 PROCEDURE — G6002 STEREOSCOPIC X-RAY GUIDANCE: HCPCS | Performed by: RADIOLOGY

## 2023-09-14 NOTE — PROGRESS NOTES
Specialty Pharmacy Note: Afinitor (everolimus)    A prescription was released to be determined specialty pharmacy for   Drug: Afinitor (everolimus)  Strength: 10 mg  Directions: Take 1 tablet by mouth daily  Quantity: 30  Refills: 5    Released to pharmacy: Pending MD signature    Completed independent double check on medication order/RX.        Thanks,    Eda HIRSCH, PharmD

## 2023-09-15 ENCOUNTER — HOSPITAL ENCOUNTER (OUTPATIENT)
Dept: RADIATION ONCOLOGY | Facility: HOSPITAL | Age: 63
Discharge: HOME | End: 2023-09-15
Payer: MEDICARE

## 2023-09-15 LAB
RAD ONC ARIA COURSE ID: NORMAL
RAD ONC ARIA COURSE LAST TREATMENT DATE: NORMAL
RAD ONC ARIA COURSE START DATE: NORMAL
RAD ONC ARIA COURSE TREATMENT ELAPSED DAYS: 10
RAD ONC ARIA FIRST TREATMENT DATE: NORMAL
RAD ONC ARIA PLAN FRACTIONS TREATED TO DATE: 9
RAD ONC ARIA PLAN ID: NORMAL
RAD ONC ARIA PLAN PRESCRIBED DOSE PER FRACTION: 3 GY
RAD ONC ARIA PLAN PRIMARY REFERENCE POINT: NORMAL
RAD ONC ARIA PLAN TOTAL FRACTIONS PRESCRIBED: 10
RAD ONC ARIA PLAN TOTAL PRESCRIBED DOSE: 3000 CGY
RAD ONC ARIA REFERENCE POINT DOSAGE GIVEN TO DATE: 27 GY
RAD ONC ARIA REFERENCE POINT ID: NORMAL
RAD ONC ARIA REFERENCE POINT SESSION DOSAGE GIVEN: 3 GY

## 2023-09-15 PROCEDURE — 77387 GUIDANCE FOR RADJ TX DLVR: CPT | Performed by: RADIOLOGY

## 2023-09-15 PROCEDURE — G6002 STEREOSCOPIC X-RAY GUIDANCE: HCPCS | Performed by: RADIOLOGY

## 2023-09-15 PROCEDURE — 77412 RADIATION TX DELIVERY LVL 3: CPT | Performed by: RADIOLOGY

## 2023-09-18 ENCOUNTER — RADIATION ONCOLOGY WEEKLY ASSESSMENT (OUTPATIENT)
Dept: RADIATION ONCOLOGY | Facility: HOSPITAL | Age: 63
End: 2023-09-18
Payer: MEDICARE

## 2023-09-18 ENCOUNTER — SPECIALTY PHARMACY (OUTPATIENT)
Dept: PHARMACY | Facility: HOSPITAL | Age: 63
End: 2023-09-18
Payer: MEDICARE

## 2023-09-18 ENCOUNTER — HOSPITAL ENCOUNTER (OUTPATIENT)
Dept: RADIATION ONCOLOGY | Facility: HOSPITAL | Age: 63
Discharge: HOME | End: 2023-09-18
Payer: MEDICARE

## 2023-09-18 ENCOUNTER — DOCUMENTATION (OUTPATIENT)
Dept: ONCOLOGY | Facility: CLINIC | Age: 63
End: 2023-09-18
Payer: MEDICARE

## 2023-09-18 VITALS
DIASTOLIC BLOOD PRESSURE: 89 MMHG | BODY MASS INDEX: 28.86 KG/M2 | SYSTOLIC BLOOD PRESSURE: 152 MMHG | OXYGEN SATURATION: 97 % | HEIGHT: 60 IN | RESPIRATION RATE: 18 BRPM | HEART RATE: 87 BPM | TEMPERATURE: 98.3 F | WEIGHT: 147 LBS

## 2023-09-18 DIAGNOSIS — C79.51 MALIGNANT NEOPLASM METASTATIC TO BONE: Primary | ICD-10-CM

## 2023-09-18 DIAGNOSIS — C50.919 MALIGNANT NEOPLASM OF FEMALE BREAST, UNSPECIFIED ESTROGEN RECEPTOR STATUS, UNSPECIFIED LATERALITY, UNSPECIFIED SITE OF BREAST: Primary | ICD-10-CM

## 2023-09-18 DIAGNOSIS — C79.51 MALIGNANT NEOPLASM METASTATIC TO BONE: ICD-10-CM

## 2023-09-18 DIAGNOSIS — I10 PRIMARY HYPERTENSION: Primary | ICD-10-CM

## 2023-09-18 LAB
RAD ONC ARIA COURSE ID: NORMAL
RAD ONC ARIA COURSE LAST TREATMENT DATE: NORMAL
RAD ONC ARIA COURSE START DATE: NORMAL
RAD ONC ARIA COURSE TREATMENT ELAPSED DAYS: 13
RAD ONC ARIA FIRST TREATMENT DATE: NORMAL
RAD ONC ARIA PLAN FRACTIONS TREATED TO DATE: 10
RAD ONC ARIA PLAN ID: NORMAL
RAD ONC ARIA PLAN PRESCRIBED DOSE PER FRACTION: 3 GY
RAD ONC ARIA PLAN PRIMARY REFERENCE POINT: NORMAL
RAD ONC ARIA PLAN TOTAL FRACTIONS PRESCRIBED: 10
RAD ONC ARIA PLAN TOTAL PRESCRIBED DOSE: 3000 CGY
RAD ONC ARIA REFERENCE POINT DOSAGE GIVEN TO DATE: 30 GY
RAD ONC ARIA REFERENCE POINT ID: NORMAL
RAD ONC ARIA REFERENCE POINT SESSION DOSAGE GIVEN: 3 GY

## 2023-09-18 PROCEDURE — FACE2FACE: Performed by: RADIOLOGY

## 2023-09-18 PROCEDURE — G6002 STEREOSCOPIC X-RAY GUIDANCE: HCPCS | Performed by: RADIOLOGY

## 2023-09-18 PROCEDURE — 77412 RADIATION TX DELIVERY LVL 3: CPT | Performed by: RADIOLOGY

## 2023-09-18 PROCEDURE — 77387 GUIDANCE FOR RADJ TX DLVR: CPT | Performed by: RADIOLOGY

## 2023-09-18 RX ORDER — EXEMESTANE 25 MG/1
25 TABLET ORAL DAILY
Qty: 30 TABLET | Refills: 11 | Status: SHIPPED | OUTPATIENT
Start: 2023-09-18 | End: 2023-09-21 | Stop reason: SDUPTHER

## 2023-09-18 RX ORDER — DEXAMETHASONE 0.5 MG/5ML
0.5 SOLUTION ORAL 4 TIMES DAILY
Qty: 500 ML | Refills: 3 | Status: SHIPPED | OUTPATIENT
Start: 2023-09-18

## 2023-09-18 RX ORDER — ONDANSETRON HYDROCHLORIDE 8 MG/1
8 TABLET, FILM COATED ORAL 3 TIMES DAILY PRN
Qty: 30 TABLET | Refills: 5 | Status: SHIPPED | OUTPATIENT
Start: 2023-09-18

## 2023-09-18 RX ORDER — EVEROLIMUS 10 MG/1
10 TABLET ORAL DAILY
Qty: 30 TABLET | Refills: 5 | Status: SHIPPED | OUTPATIENT
Start: 2023-09-18

## 2023-09-18 RX ORDER — LOSARTAN POTASSIUM 50 MG/1
50 TABLET ORAL DAILY
Qty: 90 TABLET | Refills: 1 | Status: SHIPPED | OUTPATIENT
Start: 2023-09-18

## 2023-09-18 NOTE — PROGRESS NOTES
Specialty Pharmacy Patient Management Program  Oncology Initial Assessment       Gladys Garrison is a 63 y.o. female with metastatic breast cancer ER/DE positive and HER-2 bishop negative seen by an Oncology provider and enrolled in the Oncology Patient Management program offered by [facility].  An initial outreach was conducted, including assessment of therapy appropriateness and specialty medication education for Afinitor (Everolimus) and Aromasin (exemestane). The patient was introduced to services offered by [facility], including: regular assessments, refill coordination, curbside pick-up or mail order delivery options, prior authorization maintenance, and financial assistance programs as applicable. The patient was also provided with contact information for the pharmacy team.     Regimen: Afinitor (everolimus) 10mg PO daily and Aromasin (exemestane) 25 mg daily    Start date of oral specialty medication: As soon as oral specialty medication is available for Afinitor (everolimus) with expected delivery 9/20/23 and 10/12/23 for Aromasin due to recent injection of Faslodex.    Relevant Past Medical History, Comorbidities, and Vaccines  Relevant medical history and concomitant health conditions were discussed with the patient. The patient's chart has been reviewed for relevant past medical history and comorbid health conditions and updated as necessary.  Vaccines are coordinated by the patient's oncologist and primary care provider.  Past Medical History:   Diagnosis Date    Allergic     Bone cancer     METASTATIC BONE    Bradycardia     SECONDARY TO ABLATION    Breast cancer 2017    mets to lymph nodes; did not do radiation    Cancer of unknown origin     Compression fx, thoracic spine, open, initial encounter     Coronary artery disease     COVID-19 09/01/2021    Diabetes mellitus     Heart disease, unspecified     Hepatitis C     RESOLVED WITH MEDICATION     Hyperlipidemia     Hypertension     Obesity (BMI 30-39.9) 02/05/2021    Rib fracture     Per patient    Sleep apnea     no machine    Tachycardia     ATRIAL    Type 2 diabetes mellitus 11/2017     Social History     Socioeconomic History    Marital status: Legally     Number of children: 2   Tobacco Use    Smoking status: Never     Passive exposure: Never    Smokeless tobacco: Never   Vaping Use    Vaping Use: Never used   Substance and Sexual Activity    Alcohol use: Yes     Alcohol/week: 1.0 standard drink     Types: 1 Glasses of wine per week     Comment: mixed drink once a week    Drug use: Not Currently    Sexual activity: Defer       Allergies  Known allergies and reactions were discussed with the patient. The patient's chart has been reviewed for allergy information and updated as necessary.   Allergies   Allergen Reactions    Promethazine Other (See Comments)     Hyperactive mean    Tape Other (See Comments)     .blisters          Current Medication List  This medication list has been reviewed with the patient and evaluated for any interactions or necessary modifications/recommendations, and updated to include all prescription medications, OTC medications, and supplements the patient is currently taking.  This list reflects what is contained in the patient's profile, which has also been marked as reviewed to communicate to other providers it is the most up to date version of the patient's current medication therapy.   Prior to Admission medications    Medication Sig Start Date End Date Taking? Authorizing Provider   acetaminophen (TYLENOL) 650 MG 8 hr tablet Take 1 tablet by mouth As Needed for Mild Pain. TAKES BETWEEN PAIN MEDS   Yes Provider, MD Magdalene   aspirin 81 MG chewable tablet Chew 1 tablet Daily. 12/24/19  Yes Cedric Duval Jr., MD   Calcium Carbonate-Vit D-Min (Calcium 600+D Plus Minerals) 600-400 MG-UNIT chewable tablet Chew 600 mg 2 (Two) Times a Day. 2/12/21  Yes Мария Nunez  "MD Lian   ibuprofen (ADVIL,MOTRIN) 800 MG tablet Take 1 tablet by mouth Every 8 (Eight) Hours As Needed for Mild Pain. IN BETWEEN PAIN  MEDS 7/10/23  Yes Chuck Hunter MD   insulin NPH-insulin regular (humuLIN 70/30,novoLIN 70/30) (70-30) 100 UNIT/ML injection Inject 40 Units under the skin into the appropriate area as directed Every Morning.   Yes Magdalene Russell MD   Insulin Pen Needle (B-D UF III MINI PEN NEEDLES) 31G X 5 MM misc Use to inject insulin twice daily   DX: E11.9 8/22/22  Yes Chirag Robles DO   Insulin Syringe-Needle U-100 28G X 1/2\" 1 ML misc 1 each 2 (Two) Times a Day. 11/18/22  Yes Chirag Robles DO   ondansetron ODT (ZOFRAN-ODT) 4 MG disintegrating tablet Place 1 tablet on the tongue Every 8 (Eight) Hours As Needed for Nausea or Vomiting. 9/11/23  Yes Christ Laguerre MD   rosuvastatin (Crestor) 20 MG tablet Take 1 tablet by mouth Every Night. 3/9/21  Yes Preethi Vasquez APRN   Blood Glucose Monitoring Suppl (Accu-Chek Araceli) device Use as instructed   To test blood sugar bid 10/25/21   Angelica Rodriguez MD   Continuous Blood Gluc  (FreeStyle Rajeev 14 Day [place]) device 1 each Daily. 6/19/23   Chirag Robles DO   Continuous Blood Gluc Sensor (FreeStyle Rajeev 14 Day Sensor) misc 1 each Daily. 6/19/23   Chirag Robles DO   dexAMETHasone 0.5 MG/5ML solution Take 5 mL by mouth 4 (Four) Times a Day. Swish for 2 minutes 4 times per day, do not eat or drink for 1 hour after.  Patient not taking: Reported on 9/18/2023 9/18/23   Мария Nunez MD   everolimus (AFINITOR) 10 MG tablet Take 1 tablet by mouth Daily.  Patient not taking: Reported on 9/18/2023 9/18/23   Мария Nunez MD   fluticasone (FLONASE) 50 MCG/ACT nasal spray 2 sprays into the nostril(s) as directed by provider Daily.  Patient not taking: Reported on 9/18/2023 5/5/23   Chirag Robles,    losartan (Cozaar) 50 MG tablet Take 1 tablet by mouth Daily. " Discontinue the lisinopril due to interaction with new chemotherapy  Patient not taking: Reported on 9/18/2023 9/18/23   Chirag Robles,    ondansetron (ZOFRAN) 8 MG tablet Take 1 tablet by mouth 3 (Three) Times a Day As Needed for Nausea or Vomiting.  Patient not taking: Reported on 9/18/2023 9/18/23   Мария Nunez MD   diclofenac (VOLTAREN) 75 MG EC tablet Take 1 tablet by mouth 2 (Two) Times a Day.  Patient not taking: Reported on 9/18/2023 5/24/23 9/18/23  Lavern Le APRN   everolimus (AFINITOR) 10 MG tablet Take  by mouth Daily.  9/18/23  ProviderMagdalene MD   lisinopril (PRINIVIL,ZESTRIL) 20 MG tablet Take 1 tablet by mouth Daily. Takes 4-5 times per week.  9/18/23  ProviderMagdalene MD       Drug Interactions  Reviewed concomitant medications, allergies, labs, comorbidities/medical history, quality of life, and immunization history.   Drug-drug interactions noted and discussed during education:  Significant drug interaction was noted with patient medication lisinopril and everlimus with increased potential for angioedema and recommendation per [employer] was change in therapy if possible.  Lisinopril was prescribed by patient's PCP and he was agreeable to change lisinopril to losartan, which does not have a know drug interaction.  Discussed change with patient and that losartan replaces lisinopril and she should stop lisinopril per her primary care due to interaction . Reminded the patient to let us know before making any changes or starting any new prescription or OTC medications so we can first assess drug interactions.  Drug-food interactions noted and discussed during education: Patient was instructed to avoid eating grapefruit and drinking grapefruit juice    Recommended Medications Assessment  Bone Health (such as calcium/vitamin D, bisphosphonate, RANKL inhibitor) - Currently Taking The patient is currently on Calcium and Vitamin D  VTE prophylaxis - Not Indicated  The patient is currently on Aspirin 81 mg PO daily for cardiac history  Prophylactic antimicrobials - Not Indicated     Relevant Laboratory Values  Lab Results   Component Value Date    GLUCOSE 100 (H) 09/12/2023    CALCIUM 9.2 09/12/2023     09/12/2023    K 4.3 09/12/2023    CO2 29.0 09/12/2023     09/12/2023    BUN 17 09/12/2023    CREATININE 0.65 09/12/2023    EGFRIFAFRI >60 10/12/2018    EGFRIFNONA 70 12/20/2021    BCR 26.2 (H) 09/12/2023    ANIONGAP 8.0 09/12/2023     Lab Results   Component Value Date    WBC 3.77 09/12/2023    RBC 4.42 09/12/2023    HGB 12.8 09/12/2023    HCT 39.4 09/12/2023    MCV 89.1 09/12/2023    MCH 29.0 09/12/2023    MCHC 32.5 09/12/2023    RDW 13.3 09/12/2023    RDWSD 42.0 09/12/2023    MPV 9.9 09/12/2023     (L) 09/12/2023    NEUTRORELPCT 73.7 09/12/2023    LYMPHORELPCT 15.9 (L) 09/12/2023    MONORELPCT 8.5 09/12/2023    EOSRELPCT 1.6 09/12/2023    BASORELPCT 0.3 09/12/2023    AUTOIGPER 0.7 (H) 07/20/2020    NEUTROABS 2.78 09/12/2023    LYMPHSABS 0.60 (L) 09/12/2023    MONOSABS 0.32 09/12/2023    EOSABS 0.06 09/12/2023    BASOSABS 0.01 09/12/2023    AUTOIGNUM 0.05 07/20/2020    NRBC 0.0 05/24/2023       The above labs have been reviewed. No dose adjustments are needed for the oral specialty medication(s) based on the labs.    Initial Education Provided for Specialty Medication  The patient has been provided with the following education. All questions and concerns have been addressed prior to the patient receiving the medication, and the patient has verbalized understanding of the education and any materials provided.  Additional patient education shall be provided and documented upon request by the patient, provider or payer.      Provided patient with:   Education sheets about the medication, 24-hour clinic phone number and my contact information and instructions to call should additional questions arise.     Medication Education Sheets Provided: (select all that  apply)  Oral Specialty Medication:  Aromasin (exemestane)  and Afinitor (everolimus)    Other Education Sheets Provided: (select all that apply)  Adherence, Blood Pressure Log, Diarrhea, and Symptom Tracker Sheet and NICK Information    TOPICS COMMENTS   Storage and Handling of Oral Specialty Medication Store in the original container, in a dry location out of direct sunlight, and out of reach of children or pets. and Store at room temperature.  Discussed safe handling and what to do with any unused medication.   Administration of Oral Specialty Medication Take with or without food at the same time(s) each day.   Adherence to Oral Specialty Regimen and Handling Missed Doses Patient is likely to have good treatment adherence; reinforced the importance of adherence. Reviewed how to address missed doses and to let us know of any missed doses.   Anemia: role of RBC, cause, s/s, ways to manage, role of transfusion Reviewed the role of RBC and the use of transfusions if hemoglobin decreases too much.  Patient to notify us if they experience shortness of breath, dizziness, or palpitations.  Also let patient know they could feel more tired than usual and to try to stay active, but rest if they need to.    Thrombocytopenia: role of platelet, cause, s/s, ways to prevent bleeding, things to avoid, when to seek help Reviewed the role of platelets in blood clotting and when to call clinic (bloody nose that bleeds for 5 mins despite pressure, a cut that won't stop bleeding despite pressure, gums that bleed excessively with brushing or flossing). Recommended using an electric razor, soft bristle toothbrush, and blowing your nose gently.    Neutropenia: role of WBC, cause, infection precautions, s/s of infection, when to call MD Reviewed the role of WBC, good infection prevention practices, and when to call the clinic (temperature 100.4F, sore throat, burning urination, etc)  COVID Vaccines: 1 dose received   Flu Vaccine: Declined  flu vaccine   Nutrition and Appetite Changes:  importance of maintaining healthy diet & weight, ways to manage to improve intake, dietary consult, exercise regimen, electrolyte and/or blood glucose abnormalities Decreased Appetite: Discussed the risk of decreased appetite. Recommended eating smaller, more frequent meals. Instructed the patient to contact the clinic if losing weight or having difficulty eating enough to maintain energy level., Increased Blood Sugar: This patient's oncology therapy can increase blood sugar.  Recommended that the patient monitor blood sugar more closely and contact their primary care provider for management recommendations if blood glucose values start trending upward., Electrolyte Abnormalities:  Explained that the oncology therapy may lead to abnormalities in electrolytes, specifically: low bicarbonate levels, low phosphate and calcium levels, low albumin levels, Lipid Panel Abnormalities:  Explained that the oncology therapy may lead to abnormalities in lipid values, specifically: elevated cholesterol and lipid levels   Diarrhea: causes, s/s of dehydration, ways to manage, dietary changes, when to call MD Chemotherapy : Discussed the risk of diarrhea. Instructed patient on use OTC loperamide with diarrhea onset, but emphasized the importance of calling the clinic if 4-6 episodes in 24 hours not relieved by OTC loperamide. Patient currently suffers    Constipation: causes, ways to manage, dietary changes, when to call MD Provided supplementary handout with instructions for use of docusate and other OTC therapies to manage constipation.  Instructed to call us if medications aren't working.   Nausea/Vomiting: cause, use of antiemetics, dietary changes, when to call MD Emetic risk: Low  PRN home meds: Ondansetron  Pharmacy home meds sent to: [facility]    Instructed the patient to take a dose of the PRN medication at the first onset of nausea and if it's not working to call us for  additional medications.  Also provided non-drug measures to mitigate nausea.   Mouth Sores: causes, oral care, ways to manage Mouth sores can be prevented by making a mouth wash mixture of salt, baking soda, and water. The patient was instructed to swish and spit four times daily after meals and before bedtime.  Use of a soft bristle toothbrush was recommended.  The patient was instructed to avoid alcohol-containing OTC mouthwashes.  Patient will utilize the dexamethasone mouth wash to help prevent mouth sores.   Nervous System Changes: causes, s/s, neuropathies, cognitive changes, ways to manage discussed the possibility of hot flashes as well as mitigation strategies   Pain: causes, ways to manage Chemo: Discussed muscle and joint aches/pains with chemotherapy, and recommended the use of OTC pain relief with ibuprofen or acetaminophen if needed.   Skin/Nail Changes: cause, s/s, ways to manage Immunotherapy - Discussed potential for a rash or itchy skin from immunotherapy, offered nonpharmacologic prevention strategies, and instructed patient to call if a rash develops that worsens or gets larger       Organ Toxicities: cause, s/s, need for diagnostic tests, labs, when to notify MD Discussed potential effects on organ systems, monitoring, diagnostic tests, labs, and when to notify their MD. Discussed the signs/symptoms of the following: hepatotoxicity, lung changes, nephrotoxicity, and skin changes   Miscellaneous Financial Issues: medication cost is a concern, care coordinator working on financial aid  Lab Draws: On or before day 1 of each cycle, no sooner than 3 days early.   Infertility and Sexuality:  causes, fertility preservation options, sexuality changes, ways to manage, importance of birth control Oral Oncology Therapy: Reviewed safe sex practices and the importance of minimizing exposure to body fluids while on oral oncology therapy., The patient is not of childbearing potential.   Home Care: how to  manage bodily fluids Counseled on management of soiled linens and proper flush technique.  Discussed how to manage all the side effects at home and advised when to contact the MD office   Survivorship: distress, distress assessment, secondary malignancies, early/late effects, follow-up, social issues, social support      Adherence and Self-Administration  Barriers to Patient Adherence and/or Self-Administration: none  Methods for Supporting Patient Adherence and/or Self-Administration:  patient states she has a system in place for medication  Expected duration of therapy: Until disease progression or intolerable toxicity    Goals of Therapy  Patient Goals of Therapy:   Consistently take medications as prescribed  Patient will adhere to medication regimen  Patient will report any medication side effects to healthcare provider  Clinical Goals:    Goals Addressed Today        Specialty Pharmacy General Goal      Clinical goal/therapeutic target: disease control, per the recent oncology clinic notes and labs.             Support patient understanding of medication regimen  Ensure patient knows the pharmacy contact information  Schedule regular follow-up to monitor the treatment serious adverse events  Schedule regular follow-up to confirm medication adherence  Schedule regular follow-up to monitor disease progression or stability    Quality of Life Assessment   The patient's current (pre-therapy) quality of life was discussed with the patient. The QOL segment of this outreach has been reviewed and updated.   Quality of Life Score: 7/10    Reassessment Plan & Follow-Up  Pharmacist to perform regular reassessments no more than (6) months from the previous assessment.  Welcome information and patient satisfaction survey to be sent by retail team with patient's initial fill.  Care Coordinator to set up future refill outreaches, coordinate prescription delivery, and escalate clinical questions to pharmacist.     Additional  Plans, Therapy Recommendations or Therapy Problems to Be Addressed: medication delivery to be set up     Attestation I attest the patient was actively involved in and has agreed to the above plan of care. If the prescribed therapy is at any point deemed not appropriate based on the current or future assessments, a consultation will be initiated with the patient's specialty care provider to determine the best course of action. The revised plan of therapy will be documented along with any required assessments and/or additional patient education provided.     Eda HIRSCH, PharmD      Date and Time: 9/18/2023  14:08 EDT

## 2023-09-18 NOTE — PROGRESS NOTES
"NAME: Gladys Garrison DATE: 2023   : 1960    MRN: [id number] DIAGNOSIS:   Encounter Diagnosis   Name Primary?    Malignant neoplasm metastatic to bone Yes          RADIATION ON TREATMENT VISIT NOTE - GENERAL     Treatment Summary  Treatment Site Ref. ID Energy Dose/Fx (cGy) #Fx Dose Correction (cGy) Total Dose (cGy) Start Date End Date Elapsed Days   T11_L5 T11_L5 10X 300 10 / 10 0 3,000 2023 13         General:     Review of Systems  [x] No new complaints [] Cough   [] Dysphagia  [] Bowel changes   [] Fever/chills  [] Nausea/vomiting  [] Skin itching/soreness/breakdown  [x] Fatigue, severity: 1 [x] Pain, severity: 8, location: lower back, shoulders/ neck area    Nausea regimen: using pre-XRT  Pain medication regimen:   Bowel regimen:   Skin regimen: Eucerin    Subjective:  She finished treatment today, she doesn't have any new symptoms, no new concerns or complaints.  In pain clinic    KPS: 70     Vital Signs: /89   Pulse 87   Temp 98.3 °F (36.8 °C) (Oral)   Resp 18   Ht 152.4 cm (60\")   Wt 66.7 kg (147 lb)   LMP  (LMP Unknown)   SpO2 97%   BMI 28.71 kg/m²     Weight:   Wt Readings from Last 3 Encounters:   23 66.7 kg (147 lb)   23 66.8 kg (147 lb 3.2 oz)   23 66.7 kg (147 lb)       Medication:   Current Outpatient Medications:     acetaminophen (TYLENOL) 650 MG 8 hr tablet, Take 1 tablet by mouth As Needed for Mild Pain. TAKES BETWEEN PAIN MEDS, Disp: , Rfl:     aspirin 81 MG chewable tablet, Chew 1 tablet Daily., Disp: 30 tablet, Rfl: 1    Blood Glucose Monitoring Suppl (Accu-Chek Araceli) device, Use as instructed   To test blood sugar bid, Disp: 1 each, Rfl: 0    Calcium Carbonate-Vit D-Min (Calcium 600+D Plus Minerals) 600-400 MG-UNIT chewable tablet, Chew 600 mg 2 (Two) Times a Day., Disp: 60 each, Rfl: 6    Continuous Blood Gluc  (FreeStyle Rajeev 14 Day [place]) device, 1 each Daily., Disp: 1 each, Rfl: 0    Continuous Blood Gluc Sensor " "(FreeStyle Rajeev 14 Day Sensor) misc, 1 each Daily., Disp: 6 each, Rfl: 3    dexAMETHasone 0.5 MG/5ML solution, Take 5 mL by mouth 4 (Four) Times a Day. Swish for 2 minutes 4 times per day, do not eat or drink for 1 hour after. (Patient not taking: Reported on 9/18/2023), Disp: 500 mL, Rfl: 3    everolimus (AFINITOR) 10 MG tablet, Take 1 tablet by mouth Daily. (Patient not taking: Reported on 9/18/2023), Disp: 30 tablet, Rfl: 5    fluticasone (FLONASE) 50 MCG/ACT nasal spray, 2 sprays into the nostril(s) as directed by provider Daily. (Patient not taking: Reported on 9/18/2023), Disp: 16 g, Rfl: 1    ibuprofen (ADVIL,MOTRIN) 800 MG tablet, Take 1 tablet by mouth Every 8 (Eight) Hours As Needed for Mild Pain. IN BETWEEN PAIN  MEDS, Disp: 90 tablet, Rfl: 1    insulin NPH-insulin regular (humuLIN 70/30,novoLIN 70/30) (70-30) 100 UNIT/ML injection, Inject 40 Units under the skin into the appropriate area as directed Every Morning., Disp: , Rfl:     Insulin Pen Needle (B-D UF III MINI PEN NEEDLES) 31G X 5 MM misc, Use to inject insulin twice daily   DX: E11.9, Disp: 100 each, Rfl: 0    Insulin Syringe-Needle U-100 28G X 1/2\" 1 ML misc, 1 each 2 (Two) Times a Day., Disp: 100 each, Rfl: 6    losartan (Cozaar) 50 MG tablet, Take 1 tablet by mouth Daily. Discontinue the lisinopril due to interaction with new chemotherapy (Patient not taking: Reported on 9/18/2023), Disp: 90 tablet, Rfl: 1    ondansetron (ZOFRAN) 8 MG tablet, Take 1 tablet by mouth 3 (Three) Times a Day As Needed for Nausea or Vomiting. (Patient not taking: Reported on 9/18/2023), Disp: 30 tablet, Rfl: 5    ondansetron ODT (ZOFRAN-ODT) 4 MG disintegrating tablet, Place 1 tablet on the tongue Every 8 (Eight) Hours As Needed for Nausea or Vomiting., Disp: 20 tablet, Rfl: 2    rosuvastatin (Crestor) 20 MG tablet, Take 1 tablet by mouth Every Night., Disp: 90 tablet, Rfl: 0     LABS (Reviewed):  Hematology  WBC   Date Value Ref Range Status   09/12/2023 3.77 3.40 " - 10.80 10*3/mm3 Final     RBC   Date Value Ref Range Status   09/12/2023 4.42 3.77 - 5.28 10*6/mm3 Final     Hemoglobin   Date Value Ref Range Status   09/12/2023 12.8 12.0 - 15.9 g/dL Final     Hematocrit   Date Value Ref Range Status   09/12/2023 39.4 34.0 - 46.6 % Final     Platelets   Date Value Ref Range Status   09/12/2023 100 (L) 140 - 450 10*3/mm3 Final     Chemistry   Lab Results   Component Value Date    GLUCOSE 100 (H) 09/12/2023    BUN 17 09/12/2023    CREATININE 0.65 09/12/2023    EGFRIFNONA 70 12/20/2021    EGFRIFAFRI >60 10/12/2018    BCR 26.2 (H) 09/12/2023    K 4.3 09/12/2023    CO2 29.0 09/12/2023    CALCIUM 9.2 09/12/2023    PROTENTOTREF 7.4 12/14/2020    ALBUMIN 4.2 09/12/2023    LABIL2 0.9 12/14/2020    AST 19 09/12/2023    ALT 13 09/12/2023       Physical Exam:     Neck: [] Lymphadenopathy  Lungs: [x] Clear to auscultation  CVS: [x] Regular rate & rhythm  Skin: [x] ndGndrndanddndend:nd nd2nd Comments/Notes:      [x] Review of labs, images, dosimetry, dose delivered, & treatment parameters.   Comments:     [x] Patient treatment setup reviewed.    Comments:     Recommendations:  continue skin care and supportive measures/meds  MRI and OSCAR follow-up is planned  Dr. Nunez is starting new systemic therapy Thursday of this week  1 month f/u here  Approved Electronically By:  Christ Laguerre MD, 9/18/2023, 15:27 EDT      Confidentiality of this medical record shall be maintained except when use or disclosure is required or permitted by law, regulation or written authorization by the patient.

## 2023-09-18 NOTE — PATIENT INSTRUCTIONS
RADIATION THERAPY DISCHARGE INSTRUCTIONS  General    CONGRATULATIONS! You completed 10 radiation treatments for treatment of metastatic cancer to your spine. These discharge instructions are important for you to follow until your one-month follow up appointment with Dr. Laguerre. Please make sure to review these instructions and call the Radiation Oncology Department if you have any questions or concerns with symptoms you may experience. Thank you for trusting us with your cancer treatment!    DIET  Eat a regular, well balanced diet that is high in protein such as meat, eggs, cheese, and nut butters  Drink 48 to 64 ounces of fluid daily  Use nutritional supplements if you are not able to eat full meals  Monitor your weight and report continued weight loss to your doctor    MEDICATIONS  Use Tylenol as needed to decrease discomfort and irritation to treatment area  Take pain medications only as prescribed  Take a laxative or stool softener as needed to prevent constipation due to pain medications    SKIN CARE  Wash treated skin gently with your hands using a mild, non-drying soap such as Dove® or Aveeno® until skin returns to normal  Pat skin dry - do not rub  Keep treated skin moist with frequent applications of Aquaphor® or unscented lotion (such as Eucerin)®  Always protect your treated skin when outdoors by wearing protective clothing and applying sunscreen SPF 15 or higher at least 30 minutes before going outdoors and reapply frequently    ACTIVITY  Fatigue is a normal side effect of radiation therapy and should improve over time  Alternate rest and activity  Exercise such as walking may help to improve your fatigue    FOLLOW-UP  Continue follow-up appointments with all other doctors as necessary  Call your radiation oncology doctor if you are concerned with any side effects you are experiencing: [phone]    _______________________________________________________________________    Completed by: Zoila Quiroz  RN on 9/18/2023 at 08:35 EDT

## 2023-09-18 NOTE — PROGRESS NOTES
RADIATION THERAPY COMPLETION and DISCHARGE     Gladys Garrison completed radiation therapy on 09/18/2023 for bone metastasis. The summary of her treatment is as follows:    TREATMENT SITE:   T11_L5 START DATE:   09/05/2023   TOTAL DOSE (cGy):   3000 END DATE:   09/18/2023   DOSE/FRACTION:   300 ELAPSED DAYS:   12   TOTAL FACTIONS:   10 PHYSICIAN:    Christ Laguerre MD     She is scheduled for a one-month follow-up appointment with Dr. Laguerre on 10/24/2023 at 3:00PM.     The following instructions were provided to the patient and/or family in their printed after visit summary (AVS) as well as discussed in-person with the radiation oncology nurse. The patient and/or family member had the opportunity to ask questions and verbalized their questions were adequately answered.   _______________________________________________________________________      RADIATION THERAPY DISCHARGE INSTRUCTIONS  General    CONGRATULATIONS! You completed 10 radiation treatments for treatment of metastatic cancer to your spine. These discharge instructions are important for you to follow until your one-month follow up appointment with Dr. Laguerre. Please make sure to review these instructions and call the Radiation Oncology Department if you have any questions or concerns with symptoms you may experience. Thank you for trusting us with your cancer treatment!    DIET  Eat a regular, well balanced diet that is high in protein such as meat, eggs, cheese, and nut butters  Drink 48 to 64 ounces of fluid daily  Use nutritional supplements if you are not able to eat full meals  Monitor your weight and report continued weight loss to your doctor    MEDICATIONS  Use Tylenol as needed to decrease discomfort and irritation to treatment area  Take pain medications only as prescribed  Take a laxative or stool softener as needed to prevent constipation due to pain medications    SKIN CARE  Wash treated skin gently with your hands using a mild, non-drying soap such  as Dove® or Aveeno® until skin returns to normal  Pat skin dry - do not rub  Keep treated skin moist with frequent applications of Aquaphor® or unscented lotion (such as Eucerin)®  Always protect your treated skin when outdoors by wearing protective clothing and applying sunscreen SPF 15 or higher at least 30 minutes before going outdoors and reapply frequently    ACTIVITY  Fatigue is a normal side effect of radiation therapy and should improve over time  Alternate rest and activity  Exercise such as walking may help to improve your fatigue    FOLLOW-UP  Continue follow-up appointments with all other doctors as necessary  Call your radiation oncology doctor if you are concerned with any side effects you are experiencing: [phone]    _______________________________________________________________________    Completed by: Zoila Quiroz RN, Radiation Oncology Nurse on 09/18/2023  at 08:33 EDT

## 2023-09-19 ENCOUNTER — DOCUMENTATION (OUTPATIENT)
Dept: PHARMACY | Facility: HOSPITAL | Age: 63
End: 2023-09-19
Payer: MEDICARE

## 2023-09-19 NOTE — PROGRESS NOTES
Specialty Pharmacy Note     Gladys initial fill of everolimus (afinitor) was dispensed from [facility] on 9/19/23. She was approved for financial assistance thru [facility] to cover the co-pay for the everolimus. Prescription delivery date is 9/20/23.    Gregorio Grimm - CARE COORDINATOR  9/19/2023  09:49 EDT

## 2023-09-21 DIAGNOSIS — C79.51 MALIGNANT NEOPLASM METASTATIC TO BONE: Primary | ICD-10-CM

## 2023-09-21 DIAGNOSIS — C50.919 MALIGNANT NEOPLASM OF FEMALE BREAST, UNSPECIFIED ESTROGEN RECEPTOR STATUS, UNSPECIFIED LATERALITY, UNSPECIFIED SITE OF BREAST: ICD-10-CM

## 2023-09-21 DIAGNOSIS — C79.51 MALIGNANT NEOPLASM METASTATIC TO BONE: ICD-10-CM

## 2023-09-21 LAB
RAD ONC ARIA COURSE END DATE: NORMAL
RAD ONC ARIA COURSE ID: NORMAL
RAD ONC ARIA COURSE LAST TREATMENT DATE: NORMAL
RAD ONC ARIA COURSE START DATE: NORMAL
RAD ONC ARIA COURSE TREATMENT ELAPSED DAYS: 13
RAD ONC ARIA FIRST TREATMENT DATE: NORMAL
RAD ONC ARIA PLAN FRACTIONS TREATED TO DATE: 10
RAD ONC ARIA PLAN ID: NORMAL
RAD ONC ARIA PLAN NAME: NORMAL
RAD ONC ARIA PLAN PRESCRIBED DOSE PER FRACTION: 3 GY
RAD ONC ARIA PLAN PRIMARY REFERENCE POINT: NORMAL
RAD ONC ARIA PLAN TOTAL FRACTIONS PRESCRIBED: 10
RAD ONC ARIA PLAN TOTAL PRESCRIBED DOSE: 3000 CGY
RAD ONC ARIA REFERENCE POINT DOSAGE GIVEN TO DATE: 30 GY
RAD ONC ARIA REFERENCE POINT ID: NORMAL

## 2023-09-21 RX ORDER — EXEMESTANE 25 MG/1
25 TABLET ORAL DAILY
Qty: 30 TABLET | Refills: 11 | Status: SHIPPED | OUTPATIENT
Start: 2023-09-21

## 2023-09-25 ENCOUNTER — TELEPHONE (OUTPATIENT)
Dept: NEUROSURGERY | Facility: CLINIC | Age: 63
End: 2023-09-25
Payer: MEDICARE

## 2023-09-25 ENCOUNTER — OFFICE VISIT (OUTPATIENT)
Dept: FAMILY MEDICINE CLINIC | Facility: CLINIC | Age: 63
End: 2023-09-25

## 2023-09-25 ENCOUNTER — LAB (OUTPATIENT)
Dept: FAMILY MEDICINE CLINIC | Facility: CLINIC | Age: 63
End: 2023-09-25
Payer: MEDICARE

## 2023-09-25 VITALS
HEART RATE: 72 BPM | RESPIRATION RATE: 18 BRPM | WEIGHT: 143.4 LBS | DIASTOLIC BLOOD PRESSURE: 62 MMHG | HEIGHT: 60 IN | SYSTOLIC BLOOD PRESSURE: 110 MMHG | OXYGEN SATURATION: 96 % | BODY MASS INDEX: 28.16 KG/M2

## 2023-09-25 DIAGNOSIS — R10.13 EPIGASTRIC PAIN: ICD-10-CM

## 2023-09-25 DIAGNOSIS — Z79.4 TYPE 2 DIABETES MELLITUS WITH HYPERGLYCEMIA, WITH LONG-TERM CURRENT USE OF INSULIN: ICD-10-CM

## 2023-09-25 DIAGNOSIS — I10 PRIMARY HYPERTENSION: ICD-10-CM

## 2023-09-25 DIAGNOSIS — E55.9 VITAMIN D DEFICIENCY: ICD-10-CM

## 2023-09-25 DIAGNOSIS — Z00.00 MEDICARE ANNUAL WELLNESS VISIT, SUBSEQUENT: Primary | ICD-10-CM

## 2023-09-25 DIAGNOSIS — E11.65 TYPE 2 DIABETES MELLITUS WITH HYPERGLYCEMIA, WITH LONG-TERM CURRENT USE OF INSULIN: ICD-10-CM

## 2023-09-25 PROBLEM — I77.9 DISORDER OF AORTA: Status: ACTIVE | Noted: 2017-11-01

## 2023-09-25 LAB
25(OH)D3 SERPL-MCNC: 27.6 NG/ML (ref 30–100)
ALBUMIN UR-MCNC: 1.9 MG/DL
CHOLEST SERPL-MCNC: 171 MG/DL (ref 0–200)
HBA1C MFR BLD: 6.2 % (ref 4.8–5.6)
HDLC SERPL-MCNC: 51 MG/DL (ref 40–60)
LDLC SERPL CALC-MCNC: 104 MG/DL (ref 0–100)
LDLC/HDLC SERPL: 2 {RATIO}
TRIGL SERPL-MCNC: 89 MG/DL (ref 0–150)
TSH SERPL DL<=0.05 MIU/L-ACNC: 1.28 UIU/ML (ref 0.27–4.2)
VLDLC SERPL-MCNC: 16 MG/DL (ref 5–40)

## 2023-09-25 PROCEDURE — 84443 ASSAY THYROID STIM HORMONE: CPT | Performed by: STUDENT IN AN ORGANIZED HEALTH CARE EDUCATION/TRAINING PROGRAM

## 2023-09-25 PROCEDURE — 82043 UR ALBUMIN QUANTITATIVE: CPT | Performed by: STUDENT IN AN ORGANIZED HEALTH CARE EDUCATION/TRAINING PROGRAM

## 2023-09-25 PROCEDURE — 36415 COLL VENOUS BLD VENIPUNCTURE: CPT | Performed by: STUDENT IN AN ORGANIZED HEALTH CARE EDUCATION/TRAINING PROGRAM

## 2023-09-25 PROCEDURE — 82306 VITAMIN D 25 HYDROXY: CPT | Performed by: STUDENT IN AN ORGANIZED HEALTH CARE EDUCATION/TRAINING PROGRAM

## 2023-09-25 PROCEDURE — 83036 HEMOGLOBIN GLYCOSYLATED A1C: CPT | Performed by: STUDENT IN AN ORGANIZED HEALTH CARE EDUCATION/TRAINING PROGRAM

## 2023-09-25 PROCEDURE — 80061 LIPID PANEL: CPT | Performed by: STUDENT IN AN ORGANIZED HEALTH CARE EDUCATION/TRAINING PROGRAM

## 2023-09-25 RX ORDER — FAMOTIDINE 20 MG/1
20 TABLET, FILM COATED ORAL 2 TIMES DAILY
Qty: 60 TABLET | Refills: 3 | Status: SHIPPED | OUTPATIENT
Start: 2023-09-25

## 2023-09-25 NOTE — TELEPHONE ENCOUNTER
Called patient and left VM.     Patient will need to R/S until after completion of MRI on 10/12.     OK for HUB to relay message

## 2023-09-25 NOTE — TELEPHONE ENCOUNTER
PATIENT CALLED BACK AND HAS BEEN RESCHEDULED FOR AFTER MRI @  [facility] ON 10/12 FOR TUESDAY 10/17/23 W/ BRIEN LAIRD.     FOLLOW UP - AFTER MRI 10/12/23 @  . ALL VISITS SCHEDULED. PATIENT TRACKING [facility] REPL TERESO STEVENSG & AWARE.

## 2023-09-25 NOTE — PROGRESS NOTES
The ABCs of the Annual Wellness Visit  Subsequent Medicare Wellness Visit    Subjective    Gladys Garrison is a 63 y.o. female who presents for a Subsequent Medicare Wellness Visit.    The following portions of the patient's history were reviewed and   updated as appropriate: allergies, current medications, past family history, past medical history, past social history, past surgical history, and problem list.    Compared to one year ago, the patient feels her physical   health is worse.    Compared to one year ago, the patient feels her mental   health is worse.    Recent Hospitalizations:  This patient has had a [facility] admission record on file within the last 365 days.    Current Medical Providers:  Patient Care Team:  Chirag Robles DO as PCP - General (Family Medicine)  Мария Nunez MD as Consulting Physician (Hematology and Oncology)    Outpatient Medications Prior to Visit   Medication Sig Dispense Refill    aspirin 81 MG chewable tablet Chew 1 tablet Daily. 30 tablet 1    Blood Glucose Monitoring Suppl (Accu-Chek Araceli) device Use as instructed   To test blood sugar bid 1 each 0    Calcium Carbonate-Vit D-Min (Calcium 600+D Plus Minerals) 600-400 MG-UNIT chewable tablet Chew 600 mg 2 (Two) Times a Day. 60 each 6    Continuous Blood Gluc  (FreeStyle Rajeev 14 Day [place]) device 1 each Daily. 1 each 0    Continuous Blood Gluc Sensor (FreeStyle Rajeev 14 Day Sensor) misc 1 each Daily. 6 each 3    fluticasone (FLONASE) 50 MCG/ACT nasal spray 2 sprays into the nostril(s) as directed by provider Daily. 16 g 1    ibuprofen (ADVIL,MOTRIN) 800 MG tablet Take 1 tablet by mouth Every 8 (Eight) Hours As Needed for Mild Pain. IN BETWEEN PAIN  MEDS 90 tablet 1    insulin NPH-insulin regular (humuLIN 70/30,novoLIN 70/30) (70-30) 100 UNIT/ML injection Inject 15 Units under the skin into the appropriate area as directed Every 12 (Twelve) Hours.      Insulin Pen Needle (B-D UF III MINI PEN  "NEEDLES) 31G X 5 MM misc Use to inject insulin twice daily   DX: E11.9 100 each 0    Insulin Syringe-Needle U-100 28G X 1/2\" 1 ML misc 1 each 2 (Two) Times a Day. 100 each 6    losartan (Cozaar) 50 MG tablet Take 1 tablet by mouth Daily. Discontinue the lisinopril due to interaction with new chemotherapy 90 tablet 1    ondansetron (ZOFRAN) 8 MG tablet Take 1 tablet by mouth 3 (Three) Times a Day As Needed for Nausea or Vomiting. 30 tablet 5    ondansetron ODT (ZOFRAN-ODT) 4 MG disintegrating tablet Place 1 tablet on the tongue Every 8 (Eight) Hours As Needed for Nausea or Vomiting. 20 tablet 2    rosuvastatin (Crestor) 20 MG tablet Take 1 tablet by mouth Every Night. 90 tablet 0    dexAMETHasone 0.5 MG/5ML solution Take 5 mL by mouth 4 (Four) Times a Day. Swish for 2 minutes 4 times per day, do not eat or drink for 1 hour after. (Patient not taking: Reported on 9/18/2023) 500 mL 3    everolimus (AFINITOR) 10 MG tablet Take 1 tablet by mouth Daily. (Patient not taking: Reported on 9/18/2023) 30 tablet 5    exemestane (AROMASIN) 25 MG tablet Take 1 tablet by mouth Daily. (Patient not taking: Reported on 9/25/2023) 30 tablet 11    acetaminophen (TYLENOL) 650 MG 8 hr tablet Take 1 tablet by mouth As Needed for Mild Pain. TAKES BETWEEN PAIN MEDS (Patient not taking: Reported on 9/25/2023)       No facility-administered medications prior to visit.       No opioid medication identified on active medication list. I have reviewed chart for other potential  high risk medication/s and harmful drug interactions in the elderly.        Aspirin is on active medication list. Aspirin use is indicated based on review of current medical condition/s. Pros and cons of this therapy have been discussed today. Benefits of this medication outweigh potential harm.  Patient has been encouraged to continue taking this medication.  .      Patient Active Problem List   Diagnosis    Hyperlipidemia    Hypertensive disorder    Osteoarthritis of " "multiple joints    Pulmonary hypertension    Pulmonary valve disorder    Malignant tumor of breast    Tetralogy of Fallot    Tricuspid valve regurgitation    Controlled type 2 diabetes mellitus without complication, with long-term current use of insulin    Vitamin D deficiency    Acquired spondylolisthesis of cervical vertebra    Adjacent segment disease with spinal stenosis    Cervical spondylosis with myelopathy    Cervical myelopathy with cervical radiculopathy    Osteoporosis    S/P cervical spinal fusion    Lesion of lumbar spine    Atherosclerosis of coronary artery of native heart without angina pectoris    Rib pain on right side    Memory loss of unknown cause    Malignant neoplasm of female breast    Malignant neoplasm metastatic to bone    Supraventricular tachycardia    Thrombocytopenia    Systolic congestive heart failure    COVID-19    Elevated AST (SGOT)    Hyponatremia    Pacemaker    Sick sinus syndrome    AV block, complete    Cancer associated pain    Dyspnea    Pain in left knee    Syncope, unspecified syncope type     Advance Care Planning   Advance Care Planning     Advance Directive is not on file.  ACP discussion was held with the patient during this visit. Patient does not have an advance directive, information provided.     Objective    Vitals:    09/25/23 0949   BP: 110/62   Pulse: 72   Resp: 18   SpO2: 96%   Weight: 65 kg (143 lb 6.4 oz)   Height: 152.4 cm (60\")     Estimated body mass index is 28.01 kg/m² as calculated from the following:    Height as of this encounter: 152.4 cm (60\").    Weight as of this encounter: 65 kg (143 lb 6.4 oz).    BMI is >= 25 and <30. (Overweight) The following options were offered after discussion;: exercise counseling/recommendations and nutrition counseling/recommendations    Does the patient have evidence of cognitive impairment? No        HEALTH RISK ASSESSMENT    Smoking Status:  Social History     Tobacco Use   Smoking Status Never    Passive exposure: " Never   Smokeless Tobacco Never     Alcohol Consumption:  Social History     Substance and Sexual Activity   Alcohol Use Yes    Alcohol/week: 1.0 standard drink    Types: 1 Glasses of wine per week    Comment: mixed drink once a week     Fall Risk Screen:    DAMION Fall Risk Assessment has not been completed.    Depression Screenin/26/2023    10:45 AM   PHQ-2/PHQ-9 Depression Screening   Little Interest or Pleasure in Doing Things 0-->not at all   Feeling Down, Depressed or Hopeless 0-->not at all   PHQ-9: Brief Depression Severity Measure Score 0       Health Habits and Functional and Cognitive Screening:      10/11/2021    10:39 AM   Functional & Cognitive Status   Do you have difficulty preparing food and eating? No   Do you have difficulty bathing yourself, getting dressed or grooming yourself? No   Do you have difficulty using the toilet? No   Do you have difficulty moving around from place to place? No   Do you have trouble with steps or getting out of a bed or a chair? No   Current Diet Well Balanced Diet   Dental Exam Not up to date   Eye Exam Not up to date   Current Exercises Include Walking   Do you need help using the phone?  No   Are you deaf or do you have serious difficulty hearing?  No   Do you need help to go to places out of walking distance? No   Do you need help shopping? No   Do you need help preparing meals?  No   Do you need help with housework?  No   Do you need help with laundry? No   Do you need help taking your medications? Yes   Do you need help managing money? No   Have you felt unusual stress, anger or loneliness in the last month? No   Who do you live with? Spouse   If you need help, do you have trouble finding someone available to you? No   Do you have difficulty concentrating, remembering or making decisions? Yes       Age-appropriate Screening Schedule:  Refer to the list below for future screening recommendations based on patient's age, sex and/or medical conditions.  Orders for these recommended tests are listed in the plan section. The patient has been provided with a written plan.    Health Maintenance   Topic Date Due    BMI FOLLOWUP  Never done    COVID-19 Vaccine (1) Never done    ZOSTER VACCINE (1 of 2) Never done    DIABETIC FOOT EXAM  Never done    PAP SMEAR  Never done    DIABETIC EYE EXAM  Never done    URINE MICROALBUMIN  06/14/2022    ANNUAL WELLNESS VISIT  10/11/2022    DXA SCAN  01/29/2023    Pneumococcal Vaccine 0-64 (1 - PCV) 06/19/2024 (Originally 9/1/1966)    INFLUENZA VACCINE  10/01/2023    HEMOGLOBIN A1C  12/20/2023    LIPID PANEL  06/20/2024    COLORECTAL CANCER SCREENING  10/23/2030    TDAP/TD VACCINES (2 - Td or Tdap) 02/25/2032    HEPATITIS C SCREENING  Completed    Hepatitis B  Aged Out                CMS Preventative Services Quick Reference  Risk Factors Identified During Encounter  Immunizations Discussed/Encouraged: Shingrix  The above risks/problems have been discussed with the patient.  Pertinent information has been shared with the patient in the After Visit Summary.  An After Visit Summary and PPPS were made available to the patient.    Follow Up:   Next Medicare Wellness visit to be scheduled in 1 year.       Additional E&M Note during same encounter follows:  Patient has multiple medical problems which are significant and separately identifiable that require additional work above and beyond the Medicare Wellness Visit.      Chief Complaint  Annual Exam    Subjective        HPI  Recent radiations for her breast cancer and associated pain seems to have caused nausea and upset stomach.  She has had some vomiting.  She has seen Dr. Upton at [facility] for EGD and colonoscopy.  She feels the omeprazole she was on previously may have been the cause of her breast cancer.      She is starting a new chemotherapy.  She has not started it yet d/t the above stomach issues.      She is due for A1C check.  She is doing 15 units of 70/30 in the morning and 7-8  "units at night.      Objective   Vital Signs:  /62   Pulse 72   Resp 18   Ht 152.4 cm (60\")   Wt 65 kg (143 lb 6.4 oz)   SpO2 96%   BMI 28.01 kg/m²     GEN: In no acute distress, non toxic appearing  ABD: Soft, nontender, nondistended  NEURO: AAO to person, place, and time. CN 2-12 intact grossly.   PSYCH: Affect normal, insight fair              Assessment and Plan   Diagnoses and all orders for this visit:    1. Medicare annual wellness visit, subsequent (Primary)  BP at goal today.    Labs as below.  Continue close f/u with Dr. Nunez.  Continue current medication regimen.  F/u 3 months.  -     Hemoglobin A1c  -     Lipid Panel  -     TSH  -     Vitamin D,25-Hydroxy    2. Type 2 diabetes mellitus with hyperglycemia, with long-term current use of insulin  Check A1C and urine microalbumin today.  Continue current insulin regimen.  Continue freestyle chacho which has prevented any further hypoglycemic episodes.    -     Hemoglobin A1c  -     MicroAlbumin, Urine, Random - Urine, Clean Catch    3. Primary hypertension  Continue losartan 50 mg daily.  Switched from lisinopril d/t potential side effects with new chemo.  -     MicroAlbumin, Urine, Random - Urine, Clean Catch    4. Vitamin D deficiency  -     Vitamin D,25-Hydroxy    5. Epigastric pain  Trial pepcid.  See Dr. Upton for consideration of repeat upper EGD.    -     famotidine (Pepcid) 20 MG tablet; Take 1 tablet by mouth 2 (Two) Times a Day.  Dispense: 60 tablet; Refill: 3      Follow Up   Return in about 3 months (around 12/25/2023) for Recheck.  Patient was given instructions and counseling regarding her condition or for health maintenance advice. Please see specific information pulled into the AVS if appropriate.   "

## 2023-09-26 ENCOUNTER — TELEPHONE (OUTPATIENT)
Dept: FAMILY MEDICINE CLINIC | Facility: CLINIC | Age: 63
End: 2023-09-26
Payer: MEDICARE

## 2023-09-26 NOTE — TELEPHONE ENCOUNTER
HUB to share. Left patient detailed message.   ----- Message from Chirag Robles DO sent at 9/26/2023  9:58 AM EDT -----  Please let Gladys know the following regarding her labs: her cholesterol is much improved.  A1C came back at 6.2 which is great.  Thyroid function came back normal.  Her vitamin D came back slightly low at 27 but improved from where it was 2 years ago.  We have vitamin D on her medication list so she might increase her supplement by one to help bump up her levels.  Otherwise things look good and no other changes to make right now.

## 2023-09-27 NOTE — PROGRESS NOTES
"                 Nutrition Assessment  Gladys Garrison    Height: 5'0\"  Weight: 147#  BMI: 28.7  Weight Hx:   09/18/23 66.7 kg (147 lb)   09/12/23 66.8 kg (147 lb 3.2 oz)   09/12/23 66.7 kg (147 lb)   09/11/23 67.5 kg (148 lb 12.8 oz)   09/05/23 68.4 kg (150 lb 12.8 oz)   08/18/23 68.1 kg (150 lb 3.2 oz)   08/18/23 68.7 kg (151 lb 6.4 oz)   08/15/23 69.7 kg (153 lb 9.6 oz)   08/04/23 68 kg (150 lb)   07/24/23 69 kg (152 lb 3.2 oz)   07/18/23 67.7 kg (149 lb 3.2 oz)   07/07/23 67.8 kg (149 lb 6.4 oz)   06/20/23 68.9 kg (151 lb 12.8 oz)   06/19/23 69.2 kg (152 lb 9.6 oz)   06/17/23 68 kg (150 lb)   06/02/23 67.6 kg (149 lb)   05/26/23 67.6 kg (149 lb)   05/24/23 67.8 kg (149 lb 7.6 oz)   05/19/23 68 kg (150 lb)     IBW: 100# (147%)  UBW: 148# (99.3%)    Nutrition Questionnaire    Food Allergies: Pt denied allergies at this time    Chewing Problems: Pt denied chewing problems at this time.     Swallowing Problems: Pt denied problems swallowing at this time.    Who does the cooking & shopping: Pt does, pt lives with her brother-in-law, ex-, and nephew    Nausea/Vomiting/Diarrhea: Pt w/n/v/c, pt has experienced vomiting 2-3x since starting rt tx    Appetite: (poor) 1 2 3 4 5 6 7 8 9 10 (excellent): 8    24-Hour Diet Recall:  Breakfast - bowl of cheerios w/blueberries & 2% milk, coffee w/coffee mate  Lunch - homemade chicken noodle soup, water  Dinner - hamburger on a bun w/cheese, lettuce, tomato, water  Snacks - milk duds  Water < 64 oz/day    Discussion: Met the pt to provide new-pt diet education. We reviewed possible side effects of her treatment and how it may impact her ability to eat and tolerate food. We discussed the importance of weight maintenance, consuming adequate calories and protein, even when appetite is low, taste changes occur, and energy is low, pt verbalized understanding. We reviewed ways to manage side effects with diet, pt verbalized understanding.    All questions and concerns were " addressed and answered. I provided my contact information and encouraged her to call or schedule an appointment as needed.    Nutrition-Related Side Effects:    []Abdominal Pain  []Constipation  []Diarrhea  []Electrolyte Imbalance  []Fatigue  []HTN  []Hypertriglyceridemia  []Hyponatremia  []Loss of Appetite  []Low Blood Pressure  []Metallic Taste  [x]Mouth Sores  []Nausea  []Neutropenia  []Peripheral Neuropathy  []Proteinuria  []Shortness of Breath  []Taste Changes  []Vomiting  []Weakness  []Weight Gain  []Other    Marissa Tian, MS,RD,LD-[place],CD-[place]  Registered Dietitian

## 2023-10-06 NOTE — PROGRESS NOTES
Hematology/Oncology Outpatient Follow Up    PATIENT NAME:Gladys Garrison  :1960  MRN: [id number]  PRIMARY CARE PHYSICIAN: Chirag Robles DO  REFERRING PHYSICIAN: Chirag Robles, *    Chief Complaint   Patient presents with    Follow-up     Malignant neoplasm of female breast        HISTORY OF PRESENT ILLNESS:     This is a 63-year-old female who multiple comorbidities including congenital abnormalities such as hypoplastic kidney, tetralogy of Fallot, coarctation of the aorta and congenital VSD status post repair.  Patient developed syncopal episode and for that reason she had she had a CT scan of the chest which showed mass in the left breast.  She had diagnostic mammogram and ultrasound which showed a 2 cm spiculated mass in the left breast at the 1 o'clock position 6 cm from the nipple.  Biopsy of the left breast mass revealed invasive moderately differentiated carcinoma ER/[place] positive and HER-2/bishop negative.  Patient also had an ultrasound of the axilla which was concerning for an abnormal left axillary lymph node with cortical thickening. She apparently had an FNA of the left axillary nodule which was positive for malignancy.  On 2017 patient underwent left modified radical mastectomy, right prophylactic mastectomy and immediate breast reconstruction. She also underwent right prophylactic mastectomy.  We have had her on records suggest that patient did have multifocal disease with pT2 pN1 aM0.  The largest focus measured 3.5 cm.  2 of 11 lymph nodes were positive for metastatic disease some with extracapsular extension.  Notes that patient did receive Arimidex neoadjuvant  from 2017 to 2018 prior to her bilateral mastectomies.    Review of her note suggest that she developed cough which she attributed to anastrozole and patient was then placed on tamoxifen.  She had MammaPrint testing which returned with low risk for relapse.    Her postop course was also complicated by  left chest wall abscess which resulted in I&D and removal of the left tissue expander. She was referred for radiation treatment boards ultimately declined.    Patient was then placed on tamoxifen in April 2018 which she stopped after less than a month due to nausea and vomiting.  Patient in the interim also was diagnosed with chronic hepatitis C was seen by the hepatologist.  Tamoxifen was dose reduced to 10 mg daily with the goal to increase to 20 mg daily.      Patient has relocated to [facility].  She has transitioned her care to us now.  She is currently not on any antiestrogen therapy.     Her bilateral mastectomy specimen are available for review    1/29/2021 patient had bone density which showed osteoporosis  Patient was seen by neurosurgery and had a bone scan done in March 2021 which showed subtle activity in the inferior L4 vertebral body appears to correlate with new area of sclerosis on CT of the abdomen and pelvis.  There is also subtle activity with possible new lesion at the posterior left sacrum.  These findings were concerning for metastatic disease.  PET CT scan was recommended to further evaluate.  4/8/2021 patient had a PET CT scan which showed evidence of disease in the neck chest abdomen and pelvis.  But she has a 1.1 cm mixed lytic/sclerotic hypermetabolic lesion within the left hemisacrum consistent with metastatic disease.  There is also accumulation within the inferior endplate of the L4 vertebral body thought to correspond to 1.2 centimeters sclerotic lesion consistent with metastatic disease.  There is a tiny hypermetabolic focus within the L3, T11.  Suspicious for also early metastatic disease.  4/26/2021 patient underwent CT-guided biopsy of sacral lesion, pathology was consistent with metastatic breast cancer ER and AK positive HER-2/bishop was negative.  7/6/21 CT new sclerosis within the right 12th rib posteriorly which could represent metastatic lesion or a healed or healing  fracture, new sclerosis within the right 12th rib posteriorly which         could represent metastatic lesion,new sclerosis within the right T8 transverse process worrisome for metastatic        disease progression.  7/7/21 complaints of 8/10 back pain and 8/10 LUQ pain. Referral to radiation for questionable mets on CT chest.  7/26/2021 patient had CT scan of the head with contrast with did not show any evidence of metastatic disease.  CT scan of the chest abdomen and pelvis did not show any evidence of progressive disease.  7/26/2021 patient had CEA level which shows declining values CA 15-3 has decreased to 62 from 84  11/3/2021: Patient had CT scan of the chest, abdomen and pelvis. In the chest there were no suspicious pulmonary nodules. There is no clear evidence of progressive disease  12/13/2021 patient had a bone scan which showed uptake along the posterior right ribs consistent with rib fractures.  There is mild uptake in the L4 vertebral body corresponding to the sclerotic lesion seen on previous CT scan.  This was likely due to metastatic disease  10/6/2022: Patient has been lost to follow-up.  She stopped Ibrance due to pulmonary issues.  Apparently patient went to the emergency room and was told that Ibrance was causing lung damage.  October 6, 2022: She has a increasing CA 15-3 and CA 27.29 as well as CEA level.  10/19/2022: Patient had guardant 360 testing done which was negative  10/31/2022: Patient had bone scan which basically showed evidence for mild progression of multifocal osseous metastatic disease.  There appears to be new activity corresponding to the thoracic cervical spine, left scapula, left sacrum and left proximal femur.  CT scan of the chest otherwise is stable.  There is a nonobstructing stone on the left kidney.        Past Medical History:   Diagnosis Date    Allergic     Bone cancer     METASTATIC BONE    Bradycardia     SECONDARY TO ABLATION    Breast cancer 2017    mets to lymph  nodes; did not do radiation    Cancer of unknown origin     Compression fx, thoracic spine, open, initial encounter     Coronary artery disease     COVID-19 2021    Diabetes mellitus     Heart disease, unspecified     Hepatitis C     RESOLVED WITH MEDICATION    Hyperlipidemia     Hypertension     Obesity (BMI 30-39.9) 2021    Rib fracture     Per patient    Sleep apnea     no machine    Tachycardia     ATRIAL    Type 2 diabetes mellitus 2017       Past Surgical History:   Procedure Laterality Date    BACK SURGERY      neck X 2    BREAST RECONSTRUCTION Bilateral     CARDIAC ABLATION       atrial tachycardia x 5 ablations     CARDIAC CATHETERIZATION      CARDIAC SURGERY      stent placed in aorta    CARDIAC SURGERY      6 surgeries as baby     CERVICAL FUSION ANTERIOR WITH ARTIFICIAL DISCECTOMY IMPLANTATION N/A 2020    Procedure: C4 VERTEBRECTOMY AND ANTERIOR CERIVCAL DISCECTOMY WITH FUSION OF CERVICAL THREE THROUGH FIVE WITH REMOVAL OF HARDWARE C5-C6;  Surgeon: Mark Kaba MD;  Location: [facility] MAIN OR;  Service: Neurosurgery;  Laterality: N/A;     SECTION      x2    COLONOSCOPY N/A 10/23/2020    Procedure: COLONOSCOPY WITH POLYPECTOMY X6;  Surgeon: Stanley Bourne MD;  Location: [facility] ENDOSCOPY;  Service: Gastroenterology;  Laterality: N/A;  POLYPS, INTERNAL HEMORRHOIDS    ENDOMETRIAL ABLATION      REMOVAL SCAR TISSUE UTERINE    ENDOSCOPY N/A 10/23/2020    Procedure: ESOPHAGOGASTRODUODENOSCOPY WITH BIOPSY X 1 AREA;  Surgeon: Stanley Bourne MD;  Location: [facility] ENDOSCOPY;  Service: Gastroenterology;  Laterality: N/A;  GASTRITIS, ESOPHAGITIS, HIATAL HERNIA    KNEE SURGERY      MASTECTOMY Bilateral     NECK SURGERY      PACEMAKER IMPLANTATION      PAIN PUMP INSERTION/REVISION N/A 2022    Procedure: PAIN PUMP INSERTION AND INTRATHECAL CATHETER PLACEMENT;  Surgeon: Chuck Hunter MD;  Location: [facility] MAIN OR;  Service: Pain Management;   "Laterality: N/A;         Current Outpatient Medications:     aspirin 81 MG chewable tablet, Chew 1 tablet Daily., Disp: 30 tablet, Rfl: 1    Blood Glucose Monitoring Suppl (Accu-Chek Araceli) device, Use as instructed   To test blood sugar bid, Disp: 1 each, Rfl: 0    Calcium Carbonate-Vit D-Min (Calcium 600+D Plus Minerals) 600-400 MG-UNIT chewable tablet, Chew 600 mg 2 (Two) Times a Day., Disp: 60 each, Rfl: 6    Continuous Blood Gluc  (FreeStyle Rajeev 14 Day [place]) device, 1 each Daily., Disp: 1 each, Rfl: 0    Continuous Blood Gluc Sensor (FreeStyle Rajeev 14 Day Sensor) misc, 1 each Daily., Disp: 6 each, Rfl: 3    dexAMETHasone 0.5 MG/5ML solution, Take 5 mL by mouth 4 (Four) Times a Day. Swish for 2 minutes 4 times per day, do not eat or drink for 1 hour after. (Patient not taking: Reported on 9/18/2023), Disp: 500 mL, Rfl: 3    exemestane (AROMASIN) 25 MG tablet, Take 1 tablet by mouth Daily. (Patient not taking: Reported on 9/25/2023), Disp: 30 tablet, Rfl: 11    famotidine (Pepcid) 20 MG tablet, Take 1 tablet by mouth 2 (Two) Times a Day., Disp: 60 tablet, Rfl: 3    fluticasone (FLONASE) 50 MCG/ACT nasal spray, 2 sprays into the nostril(s) as directed by provider Daily., Disp: 16 g, Rfl: 1    ibuprofen (ADVIL,MOTRIN) 800 MG tablet, Take 1 tablet by mouth Every 8 (Eight) Hours As Needed for Mild Pain. IN BETWEEN PAIN  MEDS, Disp: 90 tablet, Rfl: 1    insulin NPH-insulin regular (humuLIN 70/30,novoLIN 70/30) (70-30) 100 UNIT/ML injection, Inject 15 Units under the skin into the appropriate area as directed Every 12 (Twelve) Hours., Disp: , Rfl:     Insulin Pen Needle (B-D UF III MINI PEN NEEDLES) 31G X 5 MM misc, Use to inject insulin twice daily   DX: E11.9, Disp: 100 each, Rfl: 0    Insulin Syringe-Needle U-100 28G X 1/2\" 1 ML misc, 1 each 2 (Two) Times a Day., Disp: 100 each, Rfl: 6    losartan (Cozaar) 50 MG tablet, Take 1 tablet by mouth Daily. Discontinue the lisinopril due to interaction with new " chemotherapy, Disp: 90 tablet, Rfl: 1    ondansetron (ZOFRAN) 8 MG tablet, Take 1 tablet by mouth 3 (Three) Times a Day As Needed for Nausea or Vomiting., Disp: 30 tablet, Rfl: 5    ondansetron ODT (ZOFRAN-ODT) 4 MG disintegrating tablet, Place 1 tablet on the tongue Every 8 (Eight) Hours As Needed for Nausea or Vomiting., Disp: 20 tablet, Rfl: 2    rosuvastatin (Crestor) 20 MG tablet, Take 1 tablet by mouth Every Night., Disp: 90 tablet, Rfl: 0    Allergies   Allergen Reactions    Promethazine Other (See Comments)     Hyperactive mean    Tape Other (See Comments)     .blisters         Family History   Problem Relation Age of Onset    Heart disease Mother     Stroke Mother     Lung cancer Mother     Aneurysm Father     Diabetes Sister     No Known Problems Brother     No Known Problems Brother     Diabetes type I Half-Sister     Thyroid cancer Half-Sister     Cancer Maternal Aunt     Heart attack Sister     Thyroid disease Sister     No Known Problems Sister        Cancer-related family history includes Cancer in her maternal aunt; Lung cancer in her mother; Thyroid cancer in her half-sister.    Social History     Tobacco Use    Smoking status: Never     Passive exposure: Never    Smokeless tobacco: Never   Vaping Use    Vaping Use: Never used   Substance Use Topics    Alcohol use: Yes     Alcohol/week: 1.0 standard drink of alcohol     Types: 1 Glasses of wine per week     Comment: mixed drink once a week    Drug use: Not Currently       I have reviewed and confirmed the accuracy of the patient's history: Chief complaint, HPI, ROS, and Subjective as entered by the MA/LPN/RN. Мария Nunez MD 10/09/23      SUBJECTIVE:    Patient could not tolerate Afinitor.  She has not started Aromasin    She had nausea, extreme fatigue with Afinitor and stopped taking it after 3 days.      Gladys Garrison reports a pain score of .  Given her pain assessment as noted, treatment options were discussed and the following  "options were decided upon as a follow-up plan to address the patient's pain: continuation of current treatment plan for pain and referral to specialist for assistance in pain treatment guidance.     REVIEW OF SYSTEMS:     Review of Systems   Constitutional:  Negative for chills and fever.   HENT:  Negative for ear pain, mouth sores, nosebleeds and sore throat.    Eyes:  Negative for photophobia and visual disturbance.   Respiratory:  Negative for wheezing and stridor.    Cardiovascular:  Negative for chest pain and palpitations.   Gastrointestinal:  Negative for abdominal pain, diarrhea, nausea and vomiting.   Endocrine: Negative for cold intolerance and heat intolerance.   Genitourinary:  Negative for dysuria and hematuria.   Musculoskeletal:  Negative for joint swelling and neck stiffness.   Skin:  Negative for color change and rash.   Neurological:  Negative for seizures and syncope.   Hematological:  Negative for adenopathy.        No obvious bleeding   Psychiatric/Behavioral:  Negative for agitation, confusion and hallucinations.          OBJECTIVE:    Vitals:    10/09/23 1453   BP: 165/95   Pulse: 76   Resp: 18   Temp: 98 øF (36.7 øC)   TempSrc: Infrared   SpO2: 97%   Weight: 65.3 kg (144 lb)   Height: 152.4 cm (60\")     Body mass index is 28.12 kg/mý.    ECOG    (1) Restricted in physically strenuous activity, ambulatory and able to do work of light nature    Physical Exam  Vitals and nursing note reviewed.   Constitutional:       General: She is not in acute distress.     Appearance: Normal appearance. She is not diaphoretic.   HENT:      Head: Normocephalic and atraumatic.      Mouth/Throat:      Mouth: Mucous membranes are moist.      Pharynx: Oropharynx is clear.   Eyes:      General: No scleral icterus.        Right eye: No discharge.         Left eye: No discharge.      Extraocular Movements: Extraocular movements intact.      Conjunctiva/sclera: Conjunctivae normal.   Neck:      Thyroid: No " thyromegaly.   Cardiovascular:      Rate and Rhythm: Normal rate and regular rhythm.      Pulses: Normal pulses.      Heart sounds: Normal heart sounds.      No friction rub. No gallop.   Pulmonary:      Effort: Pulmonary effort is normal. No respiratory distress.      Breath sounds: Normal breath sounds. No stridor. No wheezing.   Abdominal:      General: Bowel sounds are normal. There is distension.      Palpations: Abdomen is soft. There is no mass.      Tenderness: There is abdominal tenderness. There is guarding (Left upper abdomen). There is no rebound.   Musculoskeletal:         General: No tenderness. Normal range of motion.      Cervical back: Normal range of motion and neck supple.      Right lower leg: No edema.      Left lower leg: No edema.      Comments: Neck pain.  Patient has a neck collar.   Lymphadenopathy:      Cervical: No cervical adenopathy.   Skin:     General: Skin is warm and dry.      Capillary Refill: Capillary refill takes less than 2 seconds.      Findings: No bruising, erythema or rash.   Neurological:      Mental Status: She is alert and oriented to person, place, and time.      Cranial Nerves: No cranial nerve deficit.      Sensory: No sensory deficit.      Motor: No abnormal muscle tone.   Psychiatric:         Mood and Affect: Mood normal.         Behavior: Behavior normal.         Thought Content: Thought content normal.         Judgment: Judgment normal.     Right foot noted but no redness or increase in warmth    I have reexamined the patient and the results are consistent with the previously documented exam. Мария Nunez MD      RECENT LABS    WBC   Date Value Ref Range Status   10/09/2023 3.76 3.40 - 10.80 10*3/mm3 Final     RBC   Date Value Ref Range Status   10/09/2023 4.47 3.77 - 5.28 10*6/mm3 Final     Hemoglobin   Date Value Ref Range Status   10/09/2023 12.6 12.0 - 15.9 g/dL Final     Hematocrit   Date Value Ref Range Status   10/09/2023 39.6 34.0 - 46.6 % Final      MCV   Date Value Ref Range Status   10/09/2023 88.6 79.0 - 97.0 fL Final     MCH   Date Value Ref Range Status   10/09/2023 28.2 26.6 - 33.0 pg Final     MCHC   Date Value Ref Range Status   10/09/2023 31.8 31.5 - 35.7 g/dL Final     RDW   Date Value Ref Range Status   10/09/2023 12.8 12.3 - 15.4 % Final     RDW-SD   Date Value Ref Range Status   10/09/2023 40.6 37.0 - 54.0 fl Final     MPV   Date Value Ref Range Status   10/09/2023 10.2 6.0 - 12.0 fL Final     Platelets   Date Value Ref Range Status   10/09/2023 60 (L) 140 - 450 10*3/mm3 Final     Neutrophil %   Date Value Ref Range Status   10/09/2023 76.0 42.7 - 76.0 % Final     Lymphocyte %   Date Value Ref Range Status   10/09/2023 12.8 (L) 19.6 - 45.3 % Final     Monocyte %   Date Value Ref Range Status   10/09/2023 8.8 5.0 - 12.0 % Final     Eosinophil %   Date Value Ref Range Status   10/09/2023 2.1 0.3 - 6.2 % Final     Basophil %   Date Value Ref Range Status   10/09/2023 0.3 0.0 - 1.5 % Final     Immature Grans %   Date Value Ref Range Status   07/20/2020 0.7 (H) 0.0 - 0.5 % Final     Neutrophils, Absolute   Date Value Ref Range Status   10/09/2023 2.86 1.70 - 7.00 10*3/mm3 Final     Lymphocytes, Absolute   Date Value Ref Range Status   10/09/2023 0.48 (L) 0.70 - 3.10 10*3/mm3 Final     Monocytes, Absolute   Date Value Ref Range Status   10/09/2023 0.33 0.10 - 0.90 10*3/mm3 Final     Eosinophils, Absolute   Date Value Ref Range Status   10/09/2023 0.08 0.00 - 0.40 10*3/mm3 Final     Basophils, Absolute   Date Value Ref Range Status   10/09/2023 0.01 0.00 - 0.20 10*3/mm3 Final     Immature Grans, Absolute   Date Value Ref Range Status   07/20/2020 0.05 0.00 - 0.05 10*3/mm3 Final     nRBC   Date Value Ref Range Status   05/24/2023 0.0 0.0 - 0.2 /100 WBC Final       Lab Results   Component Value Date    GLUCOSE 100 (H) 09/12/2023    BUN 17 09/12/2023    CREATININE 0.65 09/12/2023    EGFRIFNONA 70 12/20/2021    EGFRIFAFRI >60 10/12/2018    BCR 26.2 (H)  09/12/2023    K 4.3 09/12/2023    CO2 29.0 09/12/2023    CALCIUM 9.2 09/12/2023    PROTENTOTREF 7.4 12/14/2020    ALBUMIN 4.2 09/12/2023    LABIL2 0.9 12/14/2020    AST 19 09/12/2023    ALT 13 09/12/2023       ASSESSMENT:    Metastatic breast cancer presenting as 1.1 cm mixed lytic/sclerotic hypermetabolic lesion in the left hemisacrum suspicious for metastatic disease 1.2 cm sclerotic lesion on L4, small L3 lesion and T11 lesion.  Tumor is ER positive, SC positive and HER-2/bishop negative.  Patient was prescribed combination of Ibrance and Arimidex.  Continued Ibrance due to pulmonary toxicity.  She was then placed on single agent Arimidex.  Upon progression on Arimidex was switched to Faslodex.  She developed L1 vertebral body progressive disease on Faslodex for that reason we will discontinue Faslodex and begin combination of Aromasin and Afinitor.  Patient took Afinitor for 3 days and discontinued due to nausea and fatigue.  [id number] does not show any actionable mutation.  She does not have any soft tissue for us to biopsy for additional NGS testing  We will discontinue Afinitor from her regimen and offer patient abemaciclib stable along with Aromasin.  We reviewed that abemaciclib can lead to pancytopenia, diarrhea fatigue interstitial pneumonitis.  She was given information as well to review and we will plan to start her soon as medication has been approved by her insurance  Bone metastasis:.  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  Currently undergoing XRT.  Patient is also established with Dr. Ruff.  MRI of the spine has been scheduled for October 2023  Pain management  ypT2 N1 aM0 status post left modified radical mastectomy with lymph node dissection and right prophylactic mastectomy  in 2017 performed at [facility].  ER positive, [place] positive and HER-2/bishop negative.  Status post bilateral chest wall reconstruction.  According to patient, she tolerated Arimidex very well except that she also had osteoporosis therefore Arimidex was discontinued at that time  Intolerance of tamoxifen in the past  Osteoporosis: We will transition to Xgeva since she has bone metastases  Status post neoadjuvant Arimidex prior to bilateral mastectomies  Thrombocytopenia: Work-up was - December 2020  Neck pain status post cervical spine surgery: Resolved  Complex cardiac medical history including tetralogy of Fallot, coarctation of aorta, VSD status post repair.  Status post stent placement for coarctation of aorta  Personal history and strong family history of breast cancer in multiple in the relatives on paternal side of the family including 4 paternal aunts in their 30s and 40s and 2 maternal aunts at age of 20s and 50s.  There is concern for possible hereditary breast cancer syndrome.  Patient may be  interested in pursuing cancer genetics for her management.  Assessment has been reviewed and updated          Discussion    Patient has metastatic breast cancer estrogen and progesterone dependent and HER-2/bishop negative.  She is currently on Arimidex.  We will add Ibrance.  We will also discontinue Prolia and begin Xgeva to help reduce skeletal events.           Plans:     Reviewed her recent bone scan, CT scan chest abdomen and pelvis  Discontinue Faslodex due to L spine vertebral body with progression  Continue combination of Aromasin 25 mg p.o. daily and add abemaciclib stable.  She will be given information today on abemaciclib stable  She will meet with the [facility] pharmacist next week  Checked tumor markers for CEA CA 15-3 and CA 27.29 reviewed  She has completed  palliative XRT to her lumbar spine  Follow-up with Dr. Ruff with neurosurgical services  [facility] was negative  for any actionable mutations  Follow-up with pain management with Dr. Hunter  Guardian 360 does not show any actionable mutation.  Would consider soft tissue biopsy at time of progression for additional NGS testing.  Reviewed with patient  Referred to pulmonary for her dyspnea: Dr. Julio.  Appointment is pending  Follow-up with pain management for ongoing pain issues  Follow-up with /counselor   Reviewed work-up for thrombocytopenia which was negative  Reviewed her bone density which showed osteoporosis  Schedule Xgeva 120 mg subcu monthly once her dental procedures are completed  All questions answered  Follow-up 6 weeks  All questions answered            Patient verbalized understanding and is in agreement of the above plan.

## 2023-10-09 ENCOUNTER — OFFICE VISIT (OUTPATIENT)
Dept: ONCOLOGY | Facility: CLINIC | Age: 63
End: 2023-10-09
Payer: MEDICARE

## 2023-10-09 ENCOUNTER — LAB (OUTPATIENT)
Dept: LAB | Facility: HOSPITAL | Age: 63
End: 2023-10-09
Payer: MEDICARE

## 2023-10-09 VITALS
WEIGHT: 144 LBS | BODY MASS INDEX: 28.27 KG/M2 | SYSTOLIC BLOOD PRESSURE: 165 MMHG | TEMPERATURE: 98 F | OXYGEN SATURATION: 97 % | HEART RATE: 76 BPM | RESPIRATION RATE: 18 BRPM | DIASTOLIC BLOOD PRESSURE: 95 MMHG | HEIGHT: 60 IN

## 2023-10-09 DIAGNOSIS — C79.51 MALIGNANT NEOPLASM METASTATIC TO BONE: ICD-10-CM

## 2023-10-09 DIAGNOSIS — C79.51 MALIGNANT NEOPLASM METASTATIC TO BONE: Primary | ICD-10-CM

## 2023-10-09 DIAGNOSIS — C50.919 MALIGNANT NEOPLASM OF FEMALE BREAST, UNSPECIFIED ESTROGEN RECEPTOR STATUS, UNSPECIFIED LATERALITY, UNSPECIFIED SITE OF BREAST: Primary | ICD-10-CM

## 2023-10-09 DIAGNOSIS — C50.919 MALIGNANT NEOPLASM OF FEMALE BREAST, UNSPECIFIED ESTROGEN RECEPTOR STATUS, UNSPECIFIED LATERALITY, UNSPECIFIED SITE OF BREAST: ICD-10-CM

## 2023-10-09 LAB
ALBUMIN SERPL-MCNC: 4.2 G/DL (ref 3.5–5.2)
ALBUMIN/GLOB SERPL: 1.2 G/DL
ALP SERPL-CCNC: 73 U/L (ref 39–117)
ALT SERPL W P-5'-P-CCNC: 15 U/L (ref 1–33)
ANION GAP SERPL CALCULATED.3IONS-SCNC: 9 MMOL/L (ref 5–15)
AST SERPL-CCNC: 27 U/L (ref 1–32)
BASOPHILS # BLD AUTO: 0.01 10*3/MM3 (ref 0–0.2)
BASOPHILS NFR BLD AUTO: 0.3 % (ref 0–1.5)
BILIRUB SERPL-MCNC: 0.4 MG/DL (ref 0–1.2)
BUN SERPL-MCNC: 14 MG/DL (ref 8–23)
BUN/CREAT SERPL: 23.3 (ref 7–25)
CALCIUM SPEC-SCNC: 9.8 MG/DL (ref 8.6–10.5)
CHLORIDE SERPL-SCNC: 106 MMOL/L (ref 98–107)
CO2 SERPL-SCNC: 25 MMOL/L (ref 22–29)
CREAT SERPL-MCNC: 0.6 MG/DL (ref 0.57–1)
DEPRECATED RDW RBC AUTO: 40.6 FL (ref 37–54)
EGFRCR SERPLBLD CKD-EPI 2021: 102.3 ML/MIN/1.73
EOSINOPHIL # BLD AUTO: 0.08 10*3/MM3 (ref 0–0.4)
EOSINOPHIL NFR BLD AUTO: 2.1 % (ref 0.3–6.2)
ERYTHROCYTE [DISTWIDTH] IN BLOOD BY AUTOMATED COUNT: 12.8 % (ref 12.3–15.4)
GLOBULIN UR ELPH-MCNC: 3.5 GM/DL
GLUCOSE SERPL-MCNC: 136 MG/DL (ref 65–99)
HCT VFR BLD AUTO: 39.6 % (ref 34–46.6)
HGB BLD-MCNC: 12.6 G/DL (ref 12–15.9)
HOLD SPECIMEN: NORMAL
HOLD SPECIMEN: NORMAL
LYMPHOCYTES # BLD AUTO: 0.48 10*3/MM3 (ref 0.7–3.1)
LYMPHOCYTES NFR BLD AUTO: 12.8 % (ref 19.6–45.3)
MCH RBC QN AUTO: 28.2 PG (ref 26.6–33)
MCHC RBC AUTO-ENTMCNC: 31.8 G/DL (ref 31.5–35.7)
MCV RBC AUTO: 88.6 FL (ref 79–97)
MONOCYTES # BLD AUTO: 0.33 10*3/MM3 (ref 0.1–0.9)
MONOCYTES NFR BLD AUTO: 8.8 % (ref 5–12)
NEUTROPHILS NFR BLD AUTO: 2.86 10*3/MM3 (ref 1.7–7)
NEUTROPHILS NFR BLD AUTO: 76 % (ref 42.7–76)
PLATELET # BLD AUTO: 60 10*3/MM3 (ref 140–450)
PMV BLD AUTO: 10.2 FL (ref 6–12)
POTASSIUM SERPL-SCNC: 3.8 MMOL/L (ref 3.5–5.2)
PROT SERPL-MCNC: 7.7 G/DL (ref 6–8.5)
RBC # BLD AUTO: 4.47 10*6/MM3 (ref 3.77–5.28)
SODIUM SERPL-SCNC: 140 MMOL/L (ref 136–145)
WBC NRBC COR # BLD: 3.76 10*3/MM3 (ref 3.4–10.8)

## 2023-10-09 PROCEDURE — 80053 COMPREHEN METABOLIC PANEL: CPT | Performed by: INTERNAL MEDICINE

## 2023-10-09 PROCEDURE — 85025 COMPLETE CBC W/AUTO DIFF WBC: CPT

## 2023-10-09 PROCEDURE — 36415 COLL VENOUS BLD VENIPUNCTURE: CPT

## 2023-10-09 NOTE — PATIENT INSTRUCTIONS
Abemaciclib Tablets  What is this medication?  ABEMACICLIB (a BEM a SYE klib) treats breast cancer. It works by blocking a protein that causes cancer cells to grow and multiply. This helps to slow or stop the spread of cancer cells.    This medicine may be used for other purposes; ask your health care provider or pharmacist if you have questions.    COMMON BRAND NAME(S): VERZENIO    What should I tell my care team before I take this medication?  They need to know if you have any of these conditions:    Blood clots  Diarrhea  Infection  Kidney disease  Liver disease  Low white blood cell counts  Lung disease  An unusual or allergic reaction to abemaciclib, other medications, foods, dyes, or preservatives  Pregnant or trying to get pregnant  Breastfeeding  How should I use this medication?  Take this medication by mouth. Take it as directed on the prescription label at the same time every day. Do not cut, crush, or chew this medication. Swallow the tablets whole. You can take it with or without food. If it upsets your stomach, take it with food. Your care team may change your dose or tell you to stop taking this medication if you get side effects. Do not change your dose or stop taking it unless your care team tells you to.    Do not take this medication with grapefruit juice.    Talk to your care team about the use of this medication in children. Special care may be needed.    Overdosage: If you think you have taken too much of this medicine contact a poison control center or emergency room at once.    NOTE: This medicine is only for you. Do not share this medicine with others.    What if I miss a dose?  If you miss a dose, skip it. Take your next dose at the normal time. Do not take extra or 2 doses at the same time to make up for the missed dose.    What may interact with this medication?  Bosentan  Certain antibiotics, such as erythromycin or clarithromycin  Certain antivirals for HIV or hepatitis  Certain  medications for fungal infections, such as ketoconazole, itraconazole, posaconazole  Certain medications for seizures, such as carbamazepine, phenobarbital, phenytoin  Diltiazem  Efavirenz  Grapefruit juice  Modafinil  Rifampin  Verapamil  This list may not describe all possible interactions. Give your health care provider a list of all the medicines, herbs, non-prescription drugs, or dietary supplements you use. Also tell them if you smoke, drink alcohol, or use illegal drugs. Some items may interact with your medicine.    What should I watch for while using this medication?  Your condition will be monitored carefully while you are receiving this medication. You may need blood work while taking this medication.    This medication may make you feel generally unwell. This is not uncommon as chemotherapy can affect healthy cells as well as cancer cells. Report any side effects. Continue your course of treatment even though you feel ill unless your care team tells you to stop.    This medication may increase your risk of getting an infection. Call your care team for advice if you get a fever, chills, sore throat, or other symptoms of a cold or flu. Do not treat yourself. Try to avoid being around people who are sick.    Avoid taking medications that contain aspirin, acetaminophen, ibuprofen, naproxen, or ketoprofen unless instructed by your care team. These medications may hide a fever.    Be careful brushing or flossing your teeth or using a toothpick because you may get an infection or bleed more easily. If you have any dental work done, tell your dentist you are receiving this medication.    Talk to your care team if you may be pregnant. Serious birth defects can occur if you take this medication during pregnancy and for 3 weeks after the last dose. You will need a negative pregnancy test before starting this medication. Contraception is recommended while taking this medication and for 3 weeks after the last dose.  Your care team can help you find the option that works for you.    Do not breastfeed while taking this medication and for 3 weeks after the last dose.    This medication may cause infertility. Talk to your care team if you are concerned about your fertility.    What side effects may I notice from receiving this medication?  Side effects that you should report to your care team as soon as possible:    Allergic reactions--skin rash, itching, hives, swelling of the face, lips, tongue, or throat  Blood clot--pain, swelling, or warmth in the leg, shortness of breath, chest pain  Dry cough, shortness of breath or trouble breathing  Infection--fever, chills, cough, sore throat, wounds that don't heal, pain or trouble when passing urine, general feeling of discomfort or being unwell  Liver injury--right upper belly pain, loss of appetite, nausea, light-colored stool, dark yellow or brown urine, yellowing skin or eyes, unusual weakness or fatigue  Low red blood cell level--unusual weakness or fatigue, dizziness, headache, trouble breathing  Severe or prolonged diarrhea  Unusual bruising or bleeding  Side effects that usually do not require medical attention (report these to your care team if they continue or are bothersome):    Fatigue  Hair loss  Headache  Loss of appetite  Nausea  Stomach pain  This list may not describe all possible side effects. Call your doctor for medical advice about side effects. You may report side effects to FDA at 8-409-FDA-1556.    Where should I keep my medication?  Keep out of the reach of children and pets.    Store at room temperature between 20 and 25 degrees C (68 and 77 degrees F). Get rid of any unused medication after the expiration date.    To get rid of medications that are no longer needed or have :    Take the medication to a medication take-back program. Check with your pharmacy or law enforcement to find a location.  If you cannot return the medication, ask your pharmacist  or care team how to get rid of this medication safely.  NOTE: This sheet is a summary. It may not cover all possible information. If you have questions about this medicine, talk to your doctor, pharmacist, or health care provider.    c 2023 Elsevier/Gold Standard (2023-05-15 00:00:00)    Additional Information From [employer] About Abemaciclib  Self-Care Tips  Follow regimen of anti-diarrhea medication as prescribed by your health care professional. You should begin to take anti-diarrhea medication at the first sign of loose stool.  Drink at least two to three quarts of fluid every 24 hours, unless you are instructed otherwise.  You may be at risk of infection so try to avoid crowds or people with colds, and report fever or any other signs of infection immediately to your health care provider.  Wash your hands often.  Use an electric razor and a soft toothbrush to minimize bleeding.  To reduce nausea, take anti-nausea medications as prescribed by your doctors, and eat small, frequent meals.  Eat foods that may help reduce diarrhea (see managing side effects - diarrhea).  In general, drinking alcoholic beverages should be kept to a minimum or avoided completely. You should discuss this with your doctor.  Get plenty of rest.  Maintain good nutrition.  Remain active as you are able. Gentle exercise is encouraged such as a daily walk.  If you experience symptoms or side effects, be sure to discuss them with your health care team. They can prescribe medications and/or offer other suggestions that are effective in managing such problems.    When to Contact Your Doctor or Health Care Provider  Contact your health care provider immediately, day or night, if you should experience any of the following symptoms:    Fever of 100.4§ F (38§ C) or higher, chills (possible signs of infection)  The following symptoms require medical attention, but are not an emergency. Contact your health care provider within 24 hours of noticing  any of the following:    Nausea (interferes with ability to eat and unrelieved with prescribed medication)  Vomiting (vomiting more than 4-5 times in a 24 hour period)  Loose stools  Unusual bleeding or bruising  Black or tarry stools, or blood in your stools  Blood in the urine  Pain or burning with urination  Extreme fatigue (unable to carry on self-care activities)  Pain on the upper right side of your stomach area (abdomen)  Mouth sores (painful redness, swelling or ulcers)  Swelling, redness and/or pain in one leg or arm and not the other  Always inform your health care provider if you experience any unusual symptoms.

## 2023-10-10 ENCOUNTER — DOCUMENTATION (OUTPATIENT)
Dept: PHARMACY | Facility: HOSPITAL | Age: 63
End: 2023-10-10
Payer: MEDICARE

## 2023-10-10 ENCOUNTER — SPECIALTY PHARMACY (OUTPATIENT)
Dept: PHARMACY | Facility: HOSPITAL | Age: 63
End: 2023-10-10
Payer: MEDICARE

## 2023-10-10 ENCOUNTER — OFFICE VISIT (OUTPATIENT)
Dept: CARDIOLOGY | Facility: CLINIC | Age: 63
End: 2023-10-10
Payer: MEDICARE

## 2023-10-10 ENCOUNTER — CLINICAL SUPPORT NO REQUIREMENTS (OUTPATIENT)
Dept: CARDIOLOGY | Facility: CLINIC | Age: 63
End: 2023-10-10
Payer: MEDICARE

## 2023-10-10 VITALS
RESPIRATION RATE: 18 BRPM | HEIGHT: 60 IN | HEART RATE: 79 BPM | WEIGHT: 148 LBS | DIASTOLIC BLOOD PRESSURE: 78 MMHG | BODY MASS INDEX: 29.06 KG/M2 | SYSTOLIC BLOOD PRESSURE: 136 MMHG

## 2023-10-10 DIAGNOSIS — Z95.0 PACEMAKER: ICD-10-CM

## 2023-10-10 DIAGNOSIS — I44.2 AV BLOCK, COMPLETE: Primary | ICD-10-CM

## 2023-10-10 DIAGNOSIS — Z95.0 PACEMAKER: Primary | ICD-10-CM

## 2023-10-10 PROCEDURE — 3075F SYST BP GE 130 - 139MM HG: CPT | Performed by: INTERNAL MEDICINE

## 2023-10-10 PROCEDURE — 3078F DIAST BP <80 MM HG: CPT | Performed by: INTERNAL MEDICINE

## 2023-10-10 PROCEDURE — 99214 OFFICE O/P EST MOD 30 MIN: CPT | Performed by: INTERNAL MEDICINE

## 2023-10-10 RX ORDER — ABEMACICLIB 150 MG/1
TABLET ORAL
COMMUNITY

## 2023-10-10 NOTE — PROGRESS NOTES
Oral Chemotherapy - New Referral    Received a referral from Dr. Nunez    Treatment Plan: Verzenio (abemaciclib)  Start date of treatment planned for: As soon as oral specialty medication is available.  Indication: HR +, HER2 bishop negative breast cancer  Relevant past treatments: Ibrance, declined Afinitor  Is the therapy appropriate based on treatment guidelines and FDA labeling?: yes  Therapeutic Goals: Continue treatment until progression or intolerable toxicity  Patient can self-administer oral medications: Yes    Drug-Drug Interactions: The current medication list was reviewed and there are no relevant drug-drug interactions with the specialty medication.  Medication Allergies: The patient has no relevant allergies as it relates to their oral specialty medication  Review of Labs/Dose Adjustments: The patient's most recent labs were reviewed and all are WNL to start treatment at this dose.     A prescription was released to     Specialty Pharmacy for medication.  Patient has high dollar copay so care coordinator has contacted to start process for assistance.  Drug: Verzenio (abemaciclib)  Strength: 150 mg  Directions: Take 1 tablet by mouth twice a day  Quantity: 60  Refills: 5    Pharmacy education is not yet scheduled.    Eda HIRSCH, PharmD      10/10/2023  13:43 EDT

## 2023-10-10 NOTE — PROGRESS NOTES
Specialty Pharmacy Note     Prior Authorization was done thru covermymeds. It was approved for Abemaciclib (Verzenio) 150mg. Coverage is from 10/10/23 to 4/10/24. Test claim in [facility] returned a $2,850.00 co-pay for Abemaciclib (Verzenio) 150mg BID/28-day supply.

## 2023-10-11 NOTE — PROGRESS NOTES
Oral Chemotherapy - New Referral - Dose double check      Drug-Drug Interactions: The current medication list was reviewed and there are no relevant drug-drug interactions with the specialty medication.  Medication Allergies: The patient has no relevant allergies as it relates to their oral specialty medication  Review of Labs/Dose Adjustments: The patient's most recent labs were reviewed and all are WNL to start treatment at this dose.     A prescription was released to be determined specialty pharmacy for   Drug: Verzenio (abemaciclib)  Strength: 150 mg  Directions: Take 1 tablet by mouth twice a day  Quantity: 60  Refills: 5    Pharmacy education is not yet scheduled.    Verona Cowart, Pharm.D.    10/11/2023  14:09 EDT     Detail Level: Zone

## 2023-10-12 ENCOUNTER — HOSPITAL ENCOUNTER (OUTPATIENT)
Dept: MRI IMAGING | Facility: HOSPITAL | Age: 63
Discharge: HOME | End: 2023-10-12
Payer: MEDICARE

## 2023-10-12 ENCOUNTER — TELEPHONE (OUTPATIENT)
Dept: ONCOLOGY | Facility: CLINIC | Age: 63
End: 2023-10-12
Payer: MEDICARE

## 2023-10-12 DIAGNOSIS — C79.51 METASTASIS TO SPINAL COLUMN: ICD-10-CM

## 2023-10-12 NOTE — PROGRESS NOTES
Neurosurgical Consultation      Gladys Garrison is a 63 y.o. female is here today for follow-up.    No chief complaint on file.       Previous treatment:    HPI:  ***    Past Medical History:   Diagnosis Date    Allergic     Bone cancer     METASTATIC BONE    Bradycardia     SECONDARY TO ABLATION    Breast cancer 2017    mets to lymph nodes; did not do radiation    Cancer of unknown origin     Compression fx, thoracic spine, open, initial encounter     Coronary artery disease     COVID-19 2021    Diabetes mellitus     Heart disease, unspecified     Hepatitis C     RESOLVED WITH MEDICATION    Hyperlipidemia     Hypertension     Obesity (BMI 30-39.9) 2021    Rib fracture     Per patient    Sleep apnea     no machine    Tachycardia     ATRIAL    Type 2 diabetes mellitus 2017        Past Surgical History:   Procedure Laterality Date    BACK SURGERY      neck X 2    BREAST RECONSTRUCTION Bilateral     CARDIAC ABLATION       atrial tachycardia x 5 ablations     CARDIAC CATHETERIZATION      CARDIAC SURGERY      stent placed in aorta    CARDIAC SURGERY      6 surgeries as baby     CERVICAL FUSION ANTERIOR WITH ARTIFICIAL DISCECTOMY IMPLANTATION N/A 2020    Procedure: C4 VERTEBRECTOMY AND ANTERIOR CERIVCAL DISCECTOMY WITH FUSION OF CERVICAL THREE THROUGH FIVE WITH REMOVAL OF HARDWARE C5-C6;  Surgeon: Mark Kaba MD;  Location: [facility] MAIN OR;  Service: Neurosurgery;  Laterality: N/A;     SECTION      x2    COLONOSCOPY N/A 10/23/2020    Procedure: COLONOSCOPY WITH POLYPECTOMY X6;  Surgeon: Stanley Bourne MD;  Location: [facility] ENDOSCOPY;  Service: Gastroenterology;  Laterality: N/A;  POLYPS, INTERNAL HEMORRHOIDS    ENDOMETRIAL ABLATION      REMOVAL SCAR TISSUE UTERINE    ENDOSCOPY N/A 10/23/2020    Procedure: ESOPHAGOGASTRODUODENOSCOPY WITH BIOPSY X 1 AREA;  Surgeon: Stanley Bourne MD;  Location: [facility] ENDOSCOPY;  Service: Gastroenterology;  Laterality: N/A;   "GASTRITIS, ESOPHAGITIS, HIATAL HERNIA    KNEE SURGERY  2000    MASTECTOMY Bilateral     NECK SURGERY      PACEMAKER IMPLANTATION      PAIN PUMP INSERTION/REVISION N/A 4/5/2022    Procedure: PAIN PUMP INSERTION AND INTRATHECAL CATHETER PLACEMENT;  Surgeon: Chuck Hunter MD;  Location: [facility] MAIN OR;  Service: Pain Management;  Laterality: N/A;        Current Outpatient Medications on File Prior to Visit   Medication Sig Dispense Refill    Abemaciclib (Verzenio) 150 MG tablet Take  by mouth.      aspirin 81 MG chewable tablet Chew 1 tablet Daily. 30 tablet 1    Blood Glucose Monitoring Suppl (Accu-Chek Araceli) device Use as instructed   To test blood sugar bid 1 each 0    Calcium Carbonate-Vit D-Min (Calcium 600+D Plus Minerals) 600-400 MG-UNIT chewable tablet Chew 600 mg 2 (Two) Times a Day. 60 each 6    Continuous Blood Gluc  (FreeStyle Rajeev 14 Day [place]) device 1 each Daily. 1 each 0    Continuous Blood Gluc Sensor (FreeStyle Rajeev 14 Day Sensor) misc 1 each Daily. 6 each 3    exemestane (AROMASIN) 25 MG tablet Take 1 tablet by mouth Daily. (Patient not taking: Reported on 9/25/2023) 30 tablet 11    famotidine (Pepcid) 20 MG tablet Take 1 tablet by mouth 2 (Two) Times a Day. 60 tablet 3    fluticasone (FLONASE) 50 MCG/ACT nasal spray 2 sprays into the nostril(s) as directed by provider Daily. 16 g 1    ibuprofen (ADVIL,MOTRIN) 800 MG tablet Take 1 tablet by mouth Every 8 (Eight) Hours As Needed for Mild Pain. IN BETWEEN PAIN  MEDS (Patient not taking: Reported on 10/10/2023) 90 tablet 1    insulin NPH-insulin regular (humuLIN 70/30,novoLIN 70/30) (70-30) 100 UNIT/ML injection Inject 15 Units under the skin into the appropriate area as directed Every 12 (Twelve) Hours.      Insulin Pen Needle (B-D UF III MINI PEN NEEDLES) 31G X 5 MM misc Use to inject insulin twice daily   DX: E11.9 100 each 0    Insulin Syringe-Needle U-100 28G X 1/2\" 1 ML misc 1 each 2 (Two) Times a Day. 100 each 6    losartan " (Cozaar) 50 MG tablet Take 1 tablet by mouth Daily. Discontinue the lisinopril due to interaction with new chemotherapy 90 tablet 1    ondansetron ODT (ZOFRAN-ODT) 4 MG disintegrating tablet Place 1 tablet on the tongue Every 8 (Eight) Hours As Needed for Nausea or Vomiting. 20 tablet 2    rosuvastatin (Crestor) 20 MG tablet Take 1 tablet by mouth Every Night. 90 tablet 0     No current facility-administered medications on file prior to visit.        Allergies   Allergen Reactions    Promethazine Other (See Comments)     Hyperactive mean    Tape Other (See Comments)     .blisters          Social History     Socioeconomic History    Marital status: Legally     Number of children: 2   Tobacco Use    Smoking status: Never     Passive exposure: Never    Smokeless tobacco: Never   Vaping Use    Vaping Use: Never used   Substance and Sexual Activity    Alcohol use: Yes     Alcohol/week: 1.0 standard drink of alcohol     Types: 1 Glasses of wine per week     Comment: mixed drink once a week    Drug use: Not Currently    Sexual activity: Defer          Review of Systems     Physical Examination:   There were no vitals filed for this visit.     Physical Exam     Neurological Exam     Result Review  {The following data was reviewed by (Optional):[id number]}  {Ambulatory Labs (Optional):65562}  {Data reviewed (Optional):94132:::1}       There are no diagnoses linked to this encounter.     No follow-ups on file.            Sterling Ruff MD

## 2023-10-13 ENCOUNTER — TELEPHONE (OUTPATIENT)
Dept: NEUROSURGERY | Facility: CLINIC | Age: 63
End: 2023-10-13
Payer: MEDICARE

## 2023-10-13 NOTE — TELEPHONE ENCOUNTER
Called patient and left a VM.     Patient needs to R/S due to Dr. Ruff being out of office due to emergency surgery.     OK for HUB to relay.

## 2023-10-17 ENCOUNTER — OFFICE VISIT (OUTPATIENT)
Dept: NEUROSURGERY | Facility: CLINIC | Age: 63
End: 2023-10-17
Payer: MEDICARE

## 2023-10-17 ENCOUNTER — PATIENT MESSAGE (OUTPATIENT)
Dept: NEUROSURGERY | Facility: CLINIC | Age: 63
End: 2023-10-17
Payer: MEDICARE

## 2023-10-17 ENCOUNTER — DOCUMENTATION (OUTPATIENT)
Dept: NEUROSURGERY | Facility: CLINIC | Age: 63
End: 2023-10-17

## 2023-10-17 ENCOUNTER — SPECIALTY PHARMACY (OUTPATIENT)
Dept: PHARMACY | Facility: HOSPITAL | Age: 63
End: 2023-10-17
Payer: MEDICARE

## 2023-10-17 DIAGNOSIS — C79.51 METASTASIS TO SPINAL COLUMN: Primary | ICD-10-CM

## 2023-10-17 DIAGNOSIS — S32.000A LUMBAR COMPRESSION FRACTURE, CLOSED, INITIAL ENCOUNTER: Primary | ICD-10-CM

## 2023-10-17 DIAGNOSIS — S32.000A LUMBAR COMPRESSION FRACTURE, CLOSED, INITIAL ENCOUNTER: ICD-10-CM

## 2023-10-17 DIAGNOSIS — Z98.1 S/P CERVICAL SPINAL FUSION: ICD-10-CM

## 2023-10-17 NOTE — PROGRESS NOTES
Specialty Pharmacy Patient Management Program  Oncology Initial Assessment       Gladys Garrison is a 63 y.o. female with metastatic breast cancer seen by an Oncology provider and enrolled in the Oncology Patient Management program offered by [facility].  An initial outreach was conducted, including assessment of therapy appropriateness and specialty medication education for Verzenio (abemaciclib). The patient was introduced to services offered by [facility], including: regular assessments, refill coordination, curbside pick-up or mail order delivery options, prior authorization maintenance, and financial assistance programs as applicable. The patient was also provided with contact information for the pharmacy team.     Regimen: Verzenio (abemaciclib) + Aromasin (exemestane)    Start date of oral specialty medication: As soon as oral specialty medication is available.    Relevant Past Medical History, Comorbidities, and Vaccines  Relevant medical history and concomitant health conditions were discussed with the patient. The patient's chart has been reviewed for relevant past medical history and comorbid health conditions and updated as necessary.  Vaccines are coordinated by the patient's oncologist and primary care provider.  Past Medical History:   Diagnosis Date    Allergic     Bone cancer     METASTATIC BONE    Bradycardia     SECONDARY TO ABLATION    Breast cancer 2017    mets to lymph nodes; did not do radiation    Cancer of unknown origin     Compression fx, thoracic spine, open, initial encounter     Coronary artery disease     COVID-19 09/01/2021    Diabetes mellitus     Heart disease, unspecified     Hepatitis C     RESOLVED WITH MEDICATION    Hyperlipidemia     Hypertension     Obesity (BMI 30-39.9) 02/05/2021    Rib fracture     Per patient    Sleep apnea     no machine    Tachycardia     ATRIAL    Type 2 diabetes mellitus 11/2017     Social History      Socioeconomic History    Marital status: Legally     Number of children: 2   Tobacco Use    Smoking status: Never     Passive exposure: Never    Smokeless tobacco: Never   Vaping Use    Vaping Use: Never used   Substance and Sexual Activity    Alcohol use: Yes     Alcohol/week: 1.0 standard drink of alcohol     Types: 1 Glasses of wine per week     Comment: mixed drink once a week    Drug use: Not Currently    Sexual activity: Defer       Allergies  Known allergies and reactions were discussed with the patient. The patient's chart has been reviewed for allergy information and updated as necessary.   Allergies   Allergen Reactions    Promethazine Other (See Comments)     Hyperactive mean    Tape Other (See Comments)     .blisters          Current Medication List  This medication list has been reviewed with the patient and evaluated for any interactions or necessary modifications/recommendations, and updated to include all prescription medications, OTC medications, and supplements the patient is currently taking.  This list reflects what is contained in the patient's profile, which has also been marked as reviewed to communicate to other providers it is the most up to date version of the patient's current medication therapy.   Prior to Admission medications    Medication Sig Start Date End Date Taking? Authorizing Provider   aspirin 81 MG chewable tablet Chew 1 tablet Daily. 12/24/19  Yes Cedric Duval Jr., MD   Calcium Carbonate-Vit D-Min (Calcium 600+D Plus Minerals) 600-400 MG-UNIT chewable tablet Chew 600 mg 2 (Two) Times a Day. 2/12/21  Yes Мария Nunez MD   famotidine (Pepcid) 20 MG tablet Take 1 tablet by mouth 2 (Two) Times a Day. 9/25/23  Yes Chirag Robles, DO   fluticasone (FLONASE) 50 MCG/ACT nasal spray 2 sprays into the nostril(s) as directed by provider Daily. 5/5/23  Yes Chirag Robles, DO   insulin NPH-insulin regular (humuLIN 70/30,novoLIN 70/30) (70-30) 100  "UNIT/ML injection Inject 15 Units under the skin into the appropriate area as directed Every 12 (Twelve) Hours.   Yes Magdalene Russell MD   Insulin Pen Needle (B-D UF III MINI PEN NEEDLES) 31G X 5 MM misc Use to inject insulin twice daily   DX: E11.9 8/22/22  Yes Chirag Robles DO   Insulin Syringe-Needle U-100 28G X 1/2\" 1 ML misc 1 each 2 (Two) Times a Day. 11/18/22  Yes Chirag Robles DO   losartan (Cozaar) 50 MG tablet Take 1 tablet by mouth Daily. Discontinue the lisinopril due to interaction with new chemotherapy 9/18/23  Yes Chirag Robles DO   ondansetron ODT (ZOFRAN-ODT) 4 MG disintegrating tablet Place 1 tablet on the tongue Every 8 (Eight) Hours As Needed for Nausea or Vomiting. 9/11/23  Yes Christ Laguerre MD   Abemaciclib (Verzenio) 150 MG tablet Take  by mouth.  Patient not taking: Reported on 10/17/2023    Magdalene Russell MD   Blood Glucose Monitoring Suppl (Accu-Chek Araceli) device Use as instructed   To test blood sugar bid 10/25/21   Angelica Rodriguez MD   Continuous Blood Gluc  (FreeStyle Rajeev 14 Day [place]) device 1 each Daily. 6/19/23   Chirag Robles DO   Continuous Blood Gluc Sensor (FreeStyle Rajeev 14 Day Sensor) misc 1 each Daily. 6/19/23   Chirag Robles DO   exemestane (AROMASIN) 25 MG tablet Take 1 tablet by mouth Daily.  Patient not taking: Reported on 9/25/2023 9/21/23   Мария Nunez MD   ibuprofen (ADVIL,MOTRIN) 800 MG tablet Take 1 tablet by mouth Every 8 (Eight) Hours As Needed for Mild Pain. IN BETWEEN PAIN  MEDS  Patient not taking: Reported on 10/10/2023 7/10/23   Chuck Hunter MD   rosuvastatin (Crestor) 20 MG tablet Take 1 tablet by mouth Every Night.  Patient not taking: Reported on 10/17/2023 3/9/21   Preethi Vasquez APRN   acetaminophen (TYLENOL) 650 MG 8 hr tablet Take 1 tablet by mouth As Needed for Mild Pain. TAKES BETWEEN PAIN MEDS  Patient not taking: Reported on 9/25/2023 9/25/23  Provider, " MD Magdalene   dexAMETHasone 0.5 MG/5ML solution Take 5 mL by mouth 4 (Four) Times a Day. Swish for 2 minutes 4 times per day, do not eat or drink for 1 hour after.  Patient not taking: Reported on 9/18/2023 9/18/23 10/10/23  Мария Nunez MD   diclofenac (VOLTAREN) 75 MG EC tablet Take 1 tablet by mouth 2 (Two) Times a Day.  Patient not taking: Reported on 9/18/2023 5/24/23 9/18/23  Lavern Le APRN   everolimus (AFINITOR) 10 MG tablet Take  by mouth Daily.  9/18/23  Magdalene Russell MD   everolimus (AFINITOR) 10 MG tablet Take 1 tablet by mouth Daily.  Patient not taking: Reported on 9/18/2023 9/18/23 10/9/23  Мария Nunez MD   exemestane (AROMASIN) 25 MG tablet Take 1 tablet by mouth Daily. 9/18/23 9/21/23  Мария Nunez MD   lisinopril (PRINIVIL,ZESTRIL) 20 MG tablet Take 1 tablet by mouth Daily. Takes 4-5 times per week.  9/18/23  Magdalene Russell MD   ondansetron (ZOFRAN) 8 MG tablet Take 1 tablet by mouth 3 (Three) Times a Day As Needed for Nausea or Vomiting. 9/18/23 10/10/23  Мария Nunez MD       Drug Interactions  Reviewed concomitant medications, allergies, labs, comorbidities/medical history, quality of life, and immunization history.   Drug-drug interactions noted and discussed during education: no significant drug interactions noted. . Reminded the patient to let us know before making any changes or starting any new prescription or OTC medications so we can first assess drug interactions.  Drug-food interactions noted and discussed during education: Patient was instructed to avoid eating grapefruit and drinking grapefruit juice    Recommended Medications Assessment  Bone Health (such as calcium/vitamin D, bisphosphonate, RANKL inhibitor) - Currently Taking The patient is currently on Calcium and Vitamin D  VTE prophylaxis - Not Indicated     Relevant Laboratory Values  Lab Results   Component Value Date    GLUCOSE 136 (H) 10/09/2023     CALCIUM 9.8 10/09/2023     10/09/2023    K 3.8 10/09/2023    CO2 25.0 10/09/2023     10/09/2023    BUN 14 10/09/2023    CREATININE 0.60 10/09/2023    EGFRIFAFRI >60 10/12/2018    EGFRIFNONA 70 12/20/2021    BCR 23.3 10/09/2023    ANIONGAP 9.0 10/09/2023     Lab Results   Component Value Date    WBC 3.76 10/09/2023    RBC 4.47 10/09/2023    HGB 12.6 10/09/2023    HCT 39.6 10/09/2023    MCV 88.6 10/09/2023    MCH 28.2 10/09/2023    MCHC 31.8 10/09/2023    RDW 12.8 10/09/2023    RDWSD 40.6 10/09/2023    MPV 10.2 10/09/2023    PLT 60 (L) 10/09/2023    NEUTRORELPCT 76.0 10/09/2023    LYMPHORELPCT 12.8 (L) 10/09/2023    MONORELPCT 8.8 10/09/2023    EOSRELPCT 2.1 10/09/2023    BASORELPCT 0.3 10/09/2023    AUTOIGPER 0.7 (H) 07/20/2020    NEUTROABS 2.86 10/09/2023    LYMPHSABS 0.48 (L) 10/09/2023    MONOSABS 0.33 10/09/2023    EOSABS 0.08 10/09/2023    BASOSABS 0.01 10/09/2023    AUTOIGNUM 0.05 07/20/2020    NRBC 0.0 05/24/2023       The above labs have been reviewed. No dose adjustments are needed for the oral specialty medication(s) based on the labs.    Initial Education Provided for Specialty Medication  The patient has been provided with the following education. All questions and concerns have been addressed prior to the patient receiving the medication, and the patient has verbalized understanding of the education and any materials provided.  Additional patient education shall be provided and documented upon request by the patient, provider or payer.      Provided patient with:   Education sheets about the medication, 24-hour clinic phone number and my contact information and instructions to call should additional questions arise.     Medication Education Sheets Provided: (select all that apply)  Oral Specialty Medication: Verzenio (abemaciclib)    Other Education Sheets Provided: (select all that apply)  Diarrhea    TOPICS COMMENTS   Storage and Handling of Oral Specialty Medication Store in the original  container, in a dry location out of direct sunlight, and out of reach of children or pets. and Store at room temperature.  Discussed safe handling and what to do with any unused medication.   Administration of Oral Specialty Medication Take with or without food at the same time(s) each day. and take BID   Adherence to Oral Specialty Regimen and Handling Missed Doses Patient is likely to have good treatment adherence; reinforced the importance of adherence. Reviewed how to address missed doses and to let us know of any missed doses.   Anemia: role of RBC, cause, s/s, ways to manage, role of transfusion Reviewed the role of RBC and the use of transfusions if hemoglobin decreases too much.  Patient to notify us if they experience shortness of breath, dizziness, or palpitations.  Also let patient know they could feel more tired than usual and to try to stay active, but rest if they need to.    Thrombocytopenia: role of platelet, cause, s/s, ways to prevent bleeding, things to avoid, when to seek help Reviewed the role of platelets in blood clotting and when to call clinic (bloody nose that bleeds for 5 mins despite pressure, a cut that won't stop bleeding despite pressure, gums that bleed excessively with brushing or flossing). Recommended using an electric razor, soft bristle toothbrush, and blowing your nose gently.    Neutropenia: role of WBC, cause, infection precautions, s/s of infection, when to call MD Reviewed the role of WBC, good infection prevention practices, and when to call the clinic (temperature 100.4F, sore throat, burning urination, etc)  COVID Vaccines:  previously vaccinated but declining future booster  Flu Vaccine: Planning to receive 2023 flu vaccine   Nutrition and Appetite Changes:  importance of maintaining healthy diet & weight, ways to manage to improve intake, dietary consult, exercise regimen, electrolyte and/or blood glucose abnormalities Decreased Appetite: Discussed the risk of decreased  appetite. Recommended eating smaller, more frequent meals. Instructed the patient to contact the clinic if losing weight or having difficulty eating enough to maintain energy level.   Diarrhea: causes, s/s of dehydration, ways to manage, dietary changes, when to call MD Chemotherapy : Discussed the risk of diarrhea. Instructed patient on use OTC loperamide with diarrhea onset, but emphasized the importance of calling the clinic if 4-6 episodes in 24 hours not relieved by OTC loperamide.   Constipation: causes, ways to manage, dietary changes, when to call MD Provided supplementary handout with instructions for use of docusate and other OTC therapies to manage constipation.  Instructed to call us if medications aren't working.   Nausea/Vomiting: cause, use of antiemetics, dietary changes, when to call MD Emetic risk: Moderate  PRN home meds: Ondansetron  Pharmacy home meds sent to: patient reports having medication at home from previous medication    Instructed the patient to take a dose of the PRN medication at the first onset of nausea and if it's not working to call us for additional medications.  Also provided non-drug measures to mitigate nausea.   Mouth Sores: causes, oral care, ways to manage    Alopecia: cause, ways to manage, resources    Nervous System Changes: causes, s/s, neuropathies, cognitive changes, ways to manage discussed the possibility of hot flashes as well as mitigation strategies   Pain: causes, ways to manage Chemo: Discussed muscle and joint aches/pains with chemotherapy, and recommended the use of OTC pain relief with ibuprofen or acetaminophen if needed.   Skin/Nail Changes: cause, s/s, ways to manage        Organ Toxicities: cause, s/s, need for diagnostic tests, labs, when to notify MD Discussed potential effects on organ systems, monitoring, diagnostic tests, labs, and when to notify their MD. Discussed the signs/symptoms of the following: delayed wound healing, hepatotoxicity, lung  changes, and nephrotoxicity   Miscellaneous Financial Issues: high copay and applying for free medication  Lab Draws: On days 1,15 of cycles 1,2, then starting with cycle 3 labs on day 1 of each cycle thereafter   Infertility and Sexuality:  causes, fertility preservation options, sexuality changes, ways to manage, importance of birth control Oral Oncology Therapy: Reviewed safe sex practices and the importance of minimizing exposure to body fluids while on oral oncology therapy., The patient is not of childbearing potential.   Home Care: how to manage bodily fluids Counseled on management of soiled linens and proper flush technique.  Discussed how to manage all the side effects at home and advised when to contact the MD office   Survivorship: distress, distress assessment, secondary malignancies, early/late effects, follow-up, social issues, social support      Adherence and Self-Administration  Barriers to Patient Adherence and/or Self-Administration: none  Methods for Supporting Patient Adherence and/or Self-Administration:  patient will set an alarm  Expected duration of therapy: Until disease progression or intolerable toxicity    Goals of Therapy  Patient Goals of Therapy:   Consistently take medications as prescribed  Patient will adhere to medication regimen  Patient will report any medication side effects to healthcare provider  Clinical Goals:    Goals Addressed Today    None       Support patient understanding of medication regimen  Ensure patient knows the pharmacy contact information  Schedule regular follow-up to monitor the treatment serious adverse events  Schedule regular follow-up to confirm medication adherence  Schedule regular follow-up to monitor disease progression or stability    Quality of Life Assessment   The patient's current (pre-therapy) quality of life was discussed with the patient. The QOL segment of this outreach has been reviewed and updated.   Quality of Life Score:  7/10    Reassessment Plan & Follow-Up  Pharmacist to perform regular reassessments no more than (6) months from the previous assessment.  Welcome information and patient satisfaction survey to be sent by retail team with patient's initial fill.  Care Coordinator to set up future refill outreaches, coordinate prescription delivery, and escalate clinical questions to pharmacist.     Additional Plans, Therapy Recommendations or Therapy Problems to Be Addressed: medication needs to be obtained, patient started the patient assistance program today     Attestation I attest the patient was actively involved in and has agreed to the above plan of care. If the prescribed therapy is at any point deemed not appropriate based on the current or future assessments, a consultation will be initiated with the patient's specialty care provider to determine the best course of action. The revised plan of therapy will be documented along with any required assessments and/or additional patient education provided.     Eda HIRSCH, PharmD      Date and Time: 10/17/2023  13:49 EDT

## 2023-10-17 NOTE — PROGRESS NOTES
Patient arrived for her appointment today with Dr. Ruff however we had attempted to contact her about her MRI not being done. Patient arrived and we began triaging her and she explained why she was unable to have her MRI. Dr. Ruff reviewed her chart and said we are happy to see her however he will just order a Myelogram as he would not be able to tell her any information today without any results.    Spoke with Caitlyn in MRI and she reports that the patient's pacemaker device gave them a do not scan read as there is something wrong with one of the lead placements, it has high impedence's and it is not safe for her to have the scan. Her pacemaker checks out on paper however.    Total Spine Myelogram ordered for the patient and the procedure was explained to her.

## 2023-10-20 NOTE — PROGRESS NOTES
DEVICE INTERROGATION:  IN OFFICE    DEVICE TYPE:   Dual-chamber pacemaker    :   [employer]tronic    BATTERY:  Stable    TIME TO ELECTIVE REPLACEMENT INDICATORS:   9 months    CHARGE TIME:   Not applicable        LEAD DATA:       DEVICE REPROGRAMMED FOR TESTING      Atrial:   0.5 mV, 323 ohms, 0.75 V@0.4 ms    Ventricular:     5.8 mV, 285 ohms, 1.25 V@0.4 ms    LV:      Pacemaker dependent:   Yes      Atrial pacing percentage: 93.3%    Ventricular pacing percentage: 100%      Arrhythmia Logbook Reviewed: No A-fib        Summary:    Stable Device Function    No significant arhythmia burden.     Battery status is stable.    Reviewed with: Dr. Lopez      NEXT IN OFFICE DEVICE CHECK DUE: 3 months    REMOTE DEVICE INTERROGATIONS: We will order home monitor

## 2023-10-21 ENCOUNTER — HOSPITAL ENCOUNTER (EMERGENCY)
Facility: HOSPITAL | Age: 63
Discharge: HOME | End: 2023-10-21
Attending: EMERGENCY MEDICINE
Payer: MEDICARE

## 2023-10-21 ENCOUNTER — APPOINTMENT (OUTPATIENT)
Dept: CT IMAGING | Facility: HOSPITAL | Age: 63
End: 2023-10-21
Payer: MEDICARE

## 2023-10-21 VITALS
BODY MASS INDEX: 27 KG/M2 | TEMPERATURE: 97.5 F | SYSTOLIC BLOOD PRESSURE: 134 MMHG | DIASTOLIC BLOOD PRESSURE: 71 MMHG | WEIGHT: 143 LBS | RESPIRATION RATE: 16 BRPM | HEART RATE: 71 BPM | HEIGHT: 61 IN | OXYGEN SATURATION: 98 %

## 2023-10-21 DIAGNOSIS — R45.851 SUICIDAL IDEATION: ICD-10-CM

## 2023-10-21 DIAGNOSIS — R20.2 TINGLING: ICD-10-CM

## 2023-10-21 DIAGNOSIS — M25.551 RIGHT HIP PAIN: Primary | ICD-10-CM

## 2023-10-21 LAB
ALBUMIN SERPL-MCNC: 4.2 G/DL (ref 3.5–5.2)
ALBUMIN/GLOB SERPL: 1.4 G/DL
ALP SERPL-CCNC: 79 U/L (ref 39–117)
ALT SERPL W P-5'-P-CCNC: 18 U/L (ref 1–33)
ANION GAP SERPL CALCULATED.3IONS-SCNC: 11 MMOL/L (ref 5–15)
AST SERPL-CCNC: 28 U/L (ref 1–32)
BACTERIA UR QL AUTO: ABNORMAL /HPF
BASOPHILS # BLD AUTO: 0 10*3/MM3 (ref 0–0.2)
BASOPHILS NFR BLD AUTO: 0.3 % (ref 0–1.5)
BILIRUB SERPL-MCNC: 0.4 MG/DL (ref 0–1.2)
BILIRUB UR QL STRIP: NEGATIVE
BUN SERPL-MCNC: 20 MG/DL (ref 8–23)
BUN/CREAT SERPL: 27.4 (ref 7–25)
CALCIUM SPEC-SCNC: 9.5 MG/DL (ref 8.6–10.5)
CHLORIDE SERPL-SCNC: 103 MMOL/L (ref 98–107)
CLARITY UR: CLEAR
CO2 SERPL-SCNC: 25 MMOL/L (ref 22–29)
COLOR UR: ABNORMAL
CREAT SERPL-MCNC: 0.73 MG/DL (ref 0.57–1)
DEPRECATED RDW RBC AUTO: 45.1 FL (ref 37–54)
EGFRCR SERPLBLD CKD-EPI 2021: 93.7 ML/MIN/1.73
EOSINOPHIL # BLD AUTO: 0 10*3/MM3 (ref 0–0.4)
EOSINOPHIL NFR BLD AUTO: 0.9 % (ref 0.3–6.2)
ERYTHROCYTE [DISTWIDTH] IN BLOOD BY AUTOMATED COUNT: 13.9 % (ref 12.3–15.4)
GLOBULIN UR ELPH-MCNC: 3.1 GM/DL
GLUCOSE SERPL-MCNC: 76 MG/DL (ref 65–99)
GLUCOSE UR STRIP-MCNC: NEGATIVE MG/DL
HCT VFR BLD AUTO: 37.1 % (ref 34–46.6)
HGB BLD-MCNC: 12.2 G/DL (ref 12–15.9)
HGB UR QL STRIP.AUTO: NEGATIVE
HYALINE CASTS UR QL AUTO: ABNORMAL /LPF
KETONES UR QL STRIP: NEGATIVE
LEUKOCYTE ESTERASE UR QL STRIP.AUTO: ABNORMAL
LYMPHOCYTES # BLD AUTO: 0.5 10*3/MM3 (ref 0.7–3.1)
LYMPHOCYTES NFR BLD AUTO: 14.6 % (ref 19.6–45.3)
MCH RBC QN AUTO: 28.5 PG (ref 26.6–33)
MCHC RBC AUTO-ENTMCNC: 32.9 G/DL (ref 31.5–35.7)
MCV RBC AUTO: 86.8 FL (ref 79–97)
MONOCYTES # BLD AUTO: 0.3 10*3/MM3 (ref 0.1–0.9)
MONOCYTES NFR BLD AUTO: 9.5 % (ref 5–12)
NEUTROPHILS NFR BLD AUTO: 2.7 10*3/MM3 (ref 1.7–7)
NEUTROPHILS NFR BLD AUTO: 74.7 % (ref 42.7–76)
NITRITE UR QL STRIP: NEGATIVE
NRBC BLD AUTO-RTO: 0.1 /100 WBC (ref 0–0.2)
PH UR STRIP.AUTO: 6 [PH] (ref 5–8)
PLATELET # BLD AUTO: 88 10*3/MM3 (ref 140–450)
PMV BLD AUTO: 7.8 FL (ref 6–12)
POTASSIUM SERPL-SCNC: 4 MMOL/L (ref 3.5–5.2)
PROT SERPL-MCNC: 7.3 G/DL (ref 6–8.5)
PROT UR QL STRIP: NEGATIVE
RBC # BLD AUTO: 4.27 10*6/MM3 (ref 3.77–5.28)
RBC # UR STRIP: ABNORMAL /HPF
REF LAB TEST METHOD: ABNORMAL
SODIUM SERPL-SCNC: 139 MMOL/L (ref 136–145)
SP GR UR STRIP: 1.01 (ref 1–1.03)
SQUAMOUS #/AREA URNS HPF: ABNORMAL /HPF
UROBILINOGEN UR QL STRIP: ABNORMAL
WBC # UR STRIP: ABNORMAL /HPF
WBC NRBC COR # BLD: 3.6 10*3/MM3 (ref 3.4–10.8)

## 2023-10-21 PROCEDURE — 36415 COLL VENOUS BLD VENIPUNCTURE: CPT

## 2023-10-21 PROCEDURE — 85025 COMPLETE CBC W/AUTO DIFF WBC: CPT

## 2023-10-21 PROCEDURE — 80053 COMPREHEN METABOLIC PANEL: CPT

## 2023-10-21 PROCEDURE — 99285 EMERGENCY DEPT VISIT HI MDM: CPT

## 2023-10-21 PROCEDURE — 81001 URINALYSIS AUTO W/SCOPE: CPT

## 2023-10-21 PROCEDURE — 74176 CT ABD & PELVIS W/O CONTRAST: CPT

## 2023-10-21 PROCEDURE — C1751 CATH, INF, PER/CENT/MIDLINE: HCPCS

## 2023-10-21 RX ORDER — METHOCARBAMOL 750 MG/1
750 TABLET, FILM COATED ORAL 3 TIMES DAILY PRN
Qty: 15 TABLET | Refills: 0 | Status: SHIPPED | OUTPATIENT
Start: 2023-10-21

## 2023-10-21 RX ORDER — METHOCARBAMOL 750 MG/1
750 TABLET, FILM COATED ORAL ONCE
Status: COMPLETED | OUTPATIENT
Start: 2023-10-21 | End: 2023-10-21

## 2023-10-21 RX ORDER — SODIUM CHLORIDE 0.9 % (FLUSH) 0.9 %
10 SYRINGE (ML) INJECTION AS NEEDED
Status: DISCONTINUED | OUTPATIENT
Start: 2023-10-21 | End: 2023-10-21 | Stop reason: HOSPADM

## 2023-10-21 RX ADMIN — METHOCARBAMOL 750 MG: 750 TABLET ORAL at 12:20

## 2023-10-21 NOTE — ED NOTES
"Patient stated, \"I do not want morphine. I do not want pain medication. I will take the robaxin because it is not pain medication. I am not here for pain medication. I am here to figure out what they are going to do with my back. I am not a pain addict.\" NP notified.   "

## 2023-10-21 NOTE — ED NOTES
This RN completed primary nurse responsibilities per 1:1 observation checklist. Awaiting provider decision on hold status. All items removed from room per 1:1 observation checklist.

## 2023-10-21 NOTE — ED PROVIDER NOTES
Subjective   History of Present Illness  Chief Complaint: Right hip pain, low back pain      HPI: Patient is a 63-year-old female who presents to the emergency room by private vehicle with complaints of right hip pain she has chronic low back pain that has been ongoing, she has a known history of breast cancer with bone metastases, with possible lesion in her lumbar spine she is pending a myelogram as she is unable to have an MRI completed due to her pacemaker.  She states that initial exacerbation of right hip started 5 days ago she treated with ibuprofen and went to sleep with improvement of symptoms, pain returned today she is having tingling to her right leg with low back pain exacerbated with movement she is having no saddle anesthesia no urinary or bowel retention or incontinence.  She has a pain pump in her right low back with morphine infusing.    Nursing staff completed suicide screen, patient stated that she was suicidal with a plan to administer insulin.      PCP:         Review of Systems   Musculoskeletal:  Positive for arthralgias.       Past Medical History:   Diagnosis Date    Allergic     Bone cancer     METASTATIC BONE    Bradycardia     SECONDARY TO ABLATION    Breast cancer 2017    mets to lymph nodes; did not do radiation    Cancer of unknown origin     Compression fx, thoracic spine, open, initial encounter     Coronary artery disease     COVID-19 09/01/2021    Diabetes mellitus     Heart disease, unspecified     Hepatitis C     RESOLVED WITH MEDICATION    Hyperlipidemia     Hypertension     Obesity (BMI 30-39.9) 02/05/2021    Rib fracture     Per patient    Sleep apnea     no machine    Tachycardia     ATRIAL    Type 2 diabetes mellitus 11/2017       Allergies   Allergen Reactions    Promethazine Other (See Comments)     Hyperactive mean    Tape Other (See Comments)     .blisters         Past Surgical History:   Procedure Laterality Date    BACK SURGERY      neck X 2    BREAST RECONSTRUCTION  Bilateral     CARDIAC ABLATION       atrial tachycardia x 5 ablations     CARDIAC CATHETERIZATION      CARDIAC SURGERY      stent placed in aorta    CARDIAC SURGERY      6 surgeries as baby     CERVICAL FUSION ANTERIOR WITH ARTIFICIAL DISCECTOMY IMPLANTATION N/A 2020    Procedure: C4 VERTEBRECTOMY AND ANTERIOR CERIVCAL DISCECTOMY WITH FUSION OF CERVICAL THREE THROUGH FIVE WITH REMOVAL OF HARDWARE C5-C6;  Surgeon: Mark Kaba MD;  Location: [facility] MAIN OR;  Service: Neurosurgery;  Laterality: N/A;     SECTION      x2    COLONOSCOPY N/A 10/23/2020    Procedure: COLONOSCOPY WITH POLYPECTOMY X6;  Surgeon: Stanley Bourne MD;  Location: [facility] ENDOSCOPY;  Service: Gastroenterology;  Laterality: N/A;  POLYPS, INTERNAL HEMORRHOIDS    ENDOMETRIAL ABLATION      REMOVAL SCAR TISSUE UTERINE    ENDOSCOPY N/A 10/23/2020    Procedure: ESOPHAGOGASTRODUODENOSCOPY WITH BIOPSY X 1 AREA;  Surgeon: Stanley Bourne MD;  Location: [facility] ENDOSCOPY;  Service: Gastroenterology;  Laterality: N/A;  GASTRITIS, ESOPHAGITIS, HIATAL HERNIA    KNEE SURGERY      MASTECTOMY Bilateral     NECK SURGERY      PACEMAKER IMPLANTATION      PAIN PUMP INSERTION/REVISION N/A 2022    Procedure: PAIN PUMP INSERTION AND INTRATHECAL CATHETER PLACEMENT;  Surgeon: Chuck Hunter MD;  Location: [facility] OR;  Service: Pain Management;  Laterality: N/A;       Family History   Problem Relation Age of Onset    Heart disease Mother     Stroke Mother     Lung cancer Mother     Aneurysm Father     Diabetes Sister     No Known Problems Brother     No Known Problems Brother     Diabetes type I Half-Sister     Thyroid cancer Half-Sister     Cancer Maternal Aunt     Heart attack Sister     Thyroid disease Sister     No Known Problems Sister        Social History     Socioeconomic History    Marital status: Legally     Number of children: 2   Tobacco Use    Smoking status: Never     Passive exposure: Never     "Smokeless tobacco: Never   Vaping Use    Vaping Use: Never used   Substance and Sexual Activity    Alcohol use: Yes     Alcohol/week: 1.0 standard drink of alcohol     Types: 1 Glasses of wine per week     Comment: mixed drink once a week    Drug use: Not Currently    Sexual activity: Defer           Objective   Physical Exam  Vitals reviewed.   Constitutional:       General: She is not in acute distress.     Appearance: She is obese. She is not toxic-appearing.   HENT:      Head: Normocephalic.   Eyes:      Extraocular Movements: Extraocular movements intact.      Pupils: Pupils are equal, round, and reactive to light.   Cardiovascular:      Rate and Rhythm: Normal rate.      Pulses: Normal pulses.   Pulmonary:      Effort: Pulmonary effort is normal.      Breath sounds: Normal breath sounds.   Abdominal:      General: Bowel sounds are normal.      Palpations: Abdomen is soft.   Musculoskeletal:         General: Tenderness present.      Cervical back: Normal range of motion.        Legs:       Comments: Pain on palpation  Still neurovascular intact  Strength equal bilateral  No crepitus noted no overlying erythema or ecchymosis no obvious deformity  No midline lumbar tenderness, right paraspinal discomfort  Post surgical scars with pain pump noted   Neurological:      Mental Status: She is alert.         Procedures           ED Course  ED Course as of 10/22/23 2029   Sat Oct 21, 2023   1403 Per nursing staff Yaya recommends outpatient follow-up. []   1505 Unfortunately CT reports the IV is no good for intravenous contrast.  PICC team consulted.  []   1614 Unfortunately unable to obtain sufficient intravenous access for CT with contrast.  After discussion with Dr. Garcia CT of the abdomen pelvis without contrast, focus on the right hip ordered. []      ED Course User Index  [] Lavern Le, APRN           /71   Pulse 71   Temp 97.5 °F (36.4 °C) (Oral)   Resp 16   Ht 154.9 cm (61\")   Wt " 64.9 kg (143 lb)   LMP  (LMP Unknown)   SpO2 98%   BMI 27.02 kg/m²   Labs Reviewed   COMPREHENSIVE METABOLIC PANEL - Abnormal; Notable for the following components:       Result Value    BUN/Creatinine Ratio 27.4 (*)     All other components within normal limits    Narrative:     GFR Normal >60  Chronic Kidney Disease <60  Kidney Failure <15     URINALYSIS W/ MICROSCOPIC IF INDICATED (NO CULTURE) - Abnormal; Notable for the following components:    Color, UA Orange (*)     Leuk Esterase, UA Small (1+) (*)     All other components within normal limits   CBC WITH AUTO DIFFERENTIAL - Abnormal; Notable for the following components:    Platelets 88 (*)     Lymphocyte % 14.6 (*)     Lymphocytes, Absolute 0.50 (*)     All other components within normal limits   URINALYSIS, MICROSCOPIC ONLY - Abnormal; Notable for the following components:    WBC, UA 6-10 (*)     All other components within normal limits   CBC AND DIFFERENTIAL    Narrative:     The following orders were created for panel order CBC & Differential.  Procedure                               Abnormality         Status                     ---------                               -----------         ------                     CBC Auto Differential[[id number]]        Abnormal            Final result                 Please view results for these tests on the individual orders.     Medications   methocarbamol (ROBAXIN) tablet 750 mg (750 mg Oral Given 10/21/23 1220)     CT Abdomen Pelvis Without Contrast    Result Date: 10/21/2023  Impression: Similar appearance of the osseous metastases involving the pelvis and lumbar spine, with similar known lumbar pathologic compression fractures. No evidence of new pathologic fracture. Electronically Signed: Jose Christopher MD  10/21/2023 5:08 PM EDT  Workstation ID: [id number]                                   Medical Decision Making  Patient presented to the emergency room complaining of low back and right hip pain she has known  stage IV breast cancer with mets to the spine as well as pelvis.  She is scheduled to have a CT myelogram completed of her lumbar spine as she is unable to have an MRI completed due to pacemaker lead impedance.  During triage suicide evaluation was completed and patient reported suicidal ideation with plan for insulin administration a [facility] consult was completed and intensive outpatient plan for treatment.  Patient was offered additional pain medication to which she declined, she was given a dose of Robaxin.  She has a morphine pain pump in place.  Attempted to CT with contrast of the lumbar spine and hip unfortunately after multiple ER evaluation and the PICC team unable to obtain sufficient intravenous access for contrast administration, after discussion with Dr. Garcia CT of the pelvis was obtained and was negative for acute abnormality, known osseous mets noted.  At bedside I discussed the ED findings with the patient.  We discussed possible admission for continued pain control and additional imaging to which she declined stating she would go home and constipation with her oncologist to have the myelogram scheduled we discussed worrisome symptoms to monitor for and when to return to the emergency room.  She is having no bowel or urinary changes or concerns for cauda equina symptoms are likely related to her known chronic problems.  Patient was alert oriented nontoxic at time of discharge with stable vitals, denied further questions or complaints.    Chart review: 10/17/2023 outpatient visit with neurosurgery Dr. Ruff related to mets to the spine      Note Disclaimer: At [facility], we believe that sharing information builds trust and better  relationships. You are receiving this note because you recently visited [facility]. It is possible you will see health information before a provider has talked with you about it. This kind of information can be easy to misunderstand. To help you fully  understand what it means for your health, we urge you to discuss this note with your provider.       Part of this note may be an electronic transcription/translation of spoken language to printed text using the Dragon Dictation System.    Appropriate PPE worn during exam.    Problems Addressed:  Right hip pain: complicated acute illness or injury  Suicidal ideation: complicated acute illness or injury  Tingling: complicated acute illness or injury    Amount and/or Complexity of Data Reviewed  External Data Reviewed: radiology and notes.  Labs: ordered. Decision-making details documented in ED Course.  Radiology: ordered and independent interpretation performed. Decision-making details documented in ED Course.    Risk  Prescription drug management.        Final diagnoses:   Right hip pain   Tingling   Suicidal ideation       ED Disposition  ED Disposition       ED Disposition   Discharge    Condition   Stable    Comment   --               Chirag Robles, DO  [address] [facility] [place]  [address]  [place] [address] [phone]          [address] [address] [phone]             Medication List        New Prescriptions      methocarbamol 750 MG tablet  Commonly known as: ROBAXIN  Take 1 tablet by mouth 3 (Three) Times a Day As Needed for Muscle Spasms.               Where to Get Your Medications        These medications were sent to [facility] [address] [facility] [place] - [address] - [phone]  - [phone] FX  [address] [place] [address]      Phone: [phone]   methocarbamol 750 MG tablet            Lavern Le APRN  10/22/23 2029

## 2023-10-21 NOTE — DISCHARGE INSTRUCTIONS
Prescription for Robaxin sent to your pharmacy    Continue ibuprofen as needed for discomfort, alternate heat and ice as needed    Follow-up with oncology and PCP    Return to the ED for new or worsening symptoms

## 2023-10-21 NOTE — ED NOTES
"This RN entered room to explained the need for blood work and IV. Patient stated, \"you need to get an ultrasound.\" This RN asked patient if he could at least look at the patient's arm. Patient stated, \"no way! You will just try to stick me.\" Charge RN notified.   "

## 2023-10-21 NOTE — ED NOTES
S/w Josef at [facility], he stated it could be up to an hour before pt is evaluated. I sent a facesheet and what clinical information we have, provider made aware.

## 2023-10-21 NOTE — ED NOTES
This RN was not notified of suicide precautions. This RN went to patient's room and assisted to taking all belongings from patient, security is currently at the bedside.

## 2023-10-21 NOTE — ED NOTES
Hilary from [facility] called to get ahold of this pt. She wants to do a regular phone call over a zoom call. I spoke with Leatha (charge) and ELYSE Puckett who approved the use of a phone as long as pt is being watched. PICC team is currently working with pt so I will take the phone in to her afterward so she can make the call.

## 2023-10-21 NOTE — ED NOTES
Patient spoke with Yaya and they told this RN they recommend outpatient treatment and patient does not have any current thoughts of SI. NP notified. NP deescalated security presence at this time. This RN will perform frequent checks. Charge RN notified.

## 2023-10-21 NOTE — ED NOTES
This RN asked patient to attempt to urinate to obtain urine sample but patient refused to try at this time.

## 2023-10-23 NOTE — PROGRESS NOTES
Cardiology Clinic Note  Stanley Lopez MD, PhD    Subjective:     Encounter Date:10/10/2023      Patient ID: Gladys Garrison is a 63 y.o. female.    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  Last 2D echo March 2023 revealed normal EF 55 to 60% with grade 1 diastolic dysfunction, severely reduced RV systolic function with moderately dilated RV and RA, moderate bileaflet mitral valve thickening with severe TR, mildly elevated pulmonary pressures 35-45, trivial pericardial effusion.  She also has metastatic breast cancer diagnosed 2017 with bone mets diagnosed in 2021, history of bilateral mastectomy.  Under therapy evaluation also pain management from oncology standpoint.  She has a history of pacemaker with AV dual paced rhythm initial generator 2007 generator change 2015 with LED [employer] device.  Device interrogation reveals need for generator change today.  She also history of COVID 2021 she was admitted recently in February with chest pain atypical nature thought to be secondary to metastatic disease.  She was ultimately discharged without further work-up.  She has no history of obstructive CAD or coronary intervention.  Otherwise her overall symptomatology is unchanged.  Device needs generator change today.  We have reviewed all of her cardiac testing, blood pressure heart rate goals, medication adherence.  Device interrogation reveals AV paced rhythm with no atrial fibrillation.        Review of systems otherwise negative x14 point review of systems except as mentioned above         Historical data copied forward from previous encounters in EMR including the history, exam, and assessment/plan has been reviewed and is unchanged unless noted otherwise.     Cardiac medicines reviewed with risk, benefits, and necessity of each discussed.     Risk and benefit of cardiac testing reviewed including death heart attack stroke pain bleeding infection need for vascular /cardiovascular surgery were discussed and the patient      Objective:         Objective    Vitals reviewed in EMR     Physical Exam  Regular rate and rhythm with no rubs gallops, RV heave no lift noted  2 out of 6 systolic ejection murmur left sternal border stable  No carotid bruits, positive V wave  No clubbing cyanosis or edema  Normal pulses normal cap refill  Skin, dry  She is  Assessment:         Assessment    Essential hypertension  Hyperlipidemia  Type 2 diabetes  Metastatic breast cancer with bone mets  History of bilateral mastectomy  History of pacemaker 2007 with generator change 2015, AV device [employer] dual-chamber  History of Tetralogy of Fallot status postsurgical repair as a child of VSD  RV systolic dysfunction likely secondary to severe TR is a late complication of tetralogy as well as RV lead across the valve, no echo evidence of residual VSD  RV systolic dysfunction has been known for quite some time even examining echo was back in 2021        Device interrogation reveals TREY, referred to electrophysiology for generator change    Continue aspirin  Diabetes medications goal A1c less than 7 with insulin  Lisinopril 20 daily  Crestor patient not taking with significant intolerance, will try alternative  Normal beta-blockers with AV paced rhythm RV systolic dysfunction  Likely not much to be done for RV dysfunction in the setting, not a candidate for tricuspid valve repair or replacement in the setting  Status post VSD repair in childhood secondary to tetralogy, likely postprocedural complete heart block requiring AV  "paced rhythm longstanding, generator change as above  Follow-up with oncology, unclear overall prognosis  Continue pain management with stress fractures, metastatic bone pain     MRI evaluation okay from device standpoint  Her device is MRI compatible as are the leads  She has had multiple MRIs in the past  She has been dependent on the device for quite some time secondary to multiple surgical and ablation interventions  Recommended call [employer] rep to be present at the time of cardiac MRI or other procedures with device setting optimization as well as evaluation of escape rhythm underneath the device settings      6-month follow-up       Objective:         /78 (BP Location: Right arm, Patient Position: Sitting)   Pulse 79   Resp 18   Ht 152.4 cm (60\")   Wt 67.1 kg (148 lb)   LMP  (LMP Unknown)   BMI 28.90 kg/m²   Diagnoses and all orders for this visit:    1. Pacemaker (Primary)  -     Ambulatory Referral to Cardiac Electrophysiology    Other orders  -     SCANNED - CARDIOLOGY           Plan:              The pleasure to be involved in this patient's cardiovascular care.  Please call with any questions or concerns  Stanley Lopez MD, PhD    Most recent EKG as reviewed and interpreted by me:  Procedures     Most recent echo as reviewed and interpreted by me:  Results for orders placed during the hospital encounter of 03/07/23    Adult Transthoracic Echo Complete W/ Cont if Necessary Per Protocol    Interpretation Summary    Left ventricular systolic function is normal. Left ventricular ejection fraction appears to be 56 - 60%.    Left ventricular diastolic function is consistent with (grade Ia w/high LAP) impaired relaxation.    Severely reduced right ventricular systolic function noted.    The right ventricular cavity is moderately dilated.    The right atrial cavity is moderate to severely  dilated.    There is moderate, bileaflet mitral valve thickening present.    Severe tricuspid valve " regurgitation is present.    Estimated right ventricular systolic pressure from tricuspid regurgitation is mildly elevated (35-45 mmHg).    Mild pulmonary hypertension is present.    There is a trivial pericardial effusion. There is no evidence of cardiac tamponade.      Most recent stress test as reviewed and interpreted by me:  Results for orders placed during the hospital encounter of 12/22/19    Stress Test With Myocardial Perfusion One Day    Interpretation Summary  · Findings consistent with an indeterminate ECG stress test.  · Left ventricular ejection fraction is normal (Calculated EF = 56%).  · Impressions are consistent with a low risk study.  · Nuclear images reviewed revealing prominent apical slit. Negative for ischemia.    Patient has paced rhythm, no changes during lexiscan injection    Stress portion of this exam was supervised by: Amy Cohen NP      Most recent cardiac catheterization as reviewed interpreted by me:  No results found for this or any previous visit.    The following portions of the patient's history were reviewed and updated as appropriate: allergies, current medications, past family history, past medical history, past social history, past surgical history, and problem list.      ROS:  14 point review of systems negative except as mentioned above    Current Outpatient Medications:     aspirin 81 MG chewable tablet, Chew 1 tablet Daily., Disp: 30 tablet, Rfl: 1    Calcium Carbonate-Vit D-Min (Calcium 600+D Plus Minerals) 600-400 MG-UNIT chewable tablet, Chew 600 mg 2 (Two) Times a Day., Disp: 60 each, Rfl: 6    famotidine (Pepcid) 20 MG tablet, Take 1 tablet by mouth 2 (Two) Times a Day., Disp: 60 tablet, Rfl: 3    fluticasone (FLONASE) 50 MCG/ACT nasal spray, 2 sprays into the nostril(s) as directed by provider Daily., Disp: 16 g, Rfl: 1    insulin NPH-insulin regular (humuLIN 70/30,novoLIN 70/30) (70-30) 100 UNIT/ML injection, Inject 15 Units under the skin into the appropriate area  "as directed Every 12 (Twelve) Hours., Disp: , Rfl:     losartan (Cozaar) 50 MG tablet, Take 1 tablet by mouth Daily. Discontinue the lisinopril due to interaction with new chemotherapy, Disp: 90 tablet, Rfl: 1    ondansetron ODT (ZOFRAN-ODT) 4 MG disintegrating tablet, Place 1 tablet on the tongue Every 8 (Eight) Hours As Needed for Nausea or Vomiting., Disp: 20 tablet, Rfl: 2    rosuvastatin (Crestor) 20 MG tablet, Take 1 tablet by mouth Every Night. (Patient not taking: Reported on 10/17/2023), Disp: 90 tablet, Rfl: 0    Abemaciclib (Verzenio) 150 MG tablet, Take  by mouth. (Patient not taking: Reported on 10/17/2023), Disp: , Rfl:     Blood Glucose Monitoring Suppl (Accu-Chek Araceli) device, Use as instructed   To test blood sugar bid, Disp: 1 each, Rfl: 0    Continuous Blood Gluc  (FreeStyle Rajeev 14 Day [place]) device, 1 each Daily., Disp: 1 each, Rfl: 0    Continuous Blood Gluc Sensor (FreeStyle Rajeev 14 Day Sensor) misc, 1 each Daily., Disp: 6 each, Rfl: 3    exemestane (AROMASIN) 25 MG tablet, Take 1 tablet by mouth Daily. (Patient not taking: Reported on 9/25/2023), Disp: 30 tablet, Rfl: 11    ibuprofen (ADVIL,MOTRIN) 800 MG tablet, Take 1 tablet by mouth Every 8 (Eight) Hours As Needed for Mild Pain. IN BETWEEN PAIN  MEDS (Patient not taking: Reported on 10/10/2023), Disp: 90 tablet, Rfl: 1    Insulin Pen Needle (B-D UF III MINI PEN NEEDLES) 31G X 5 MM misc, Use to inject insulin twice daily   DX: E11.9, Disp: 100 each, Rfl: 0    Insulin Syringe-Needle U-100 28G X 1/2\" 1 ML misc, 1 each 2 (Two) Times a Day., Disp: 100 each, Rfl: 6    methocarbamol (ROBAXIN) 750 MG tablet, Take 1 tablet by mouth 3 (Three) Times a Day As Needed for Muscle Spasms., Disp: 15 tablet, Rfl: 0    Problem List:  Patient Active Problem List   Diagnosis    Hyperlipidemia    Hypertensive disorder    Osteoarthritis of multiple joints    Pulmonary hypertension    Pulmonary valve disorder    Malignant tumor of breast    Tetralogy " of Fallot    Tricuspid valve regurgitation    Controlled type 2 diabetes mellitus without complication, with long-term current use of insulin    Vitamin D deficiency    Acquired spondylolisthesis of cervical vertebra    Adjacent segment disease with spinal stenosis    Cervical spondylosis with myelopathy    Cervical myelopathy with cervical radiculopathy    Osteoporosis    S/P cervical spinal fusion    Lesion of lumbar spine    Atherosclerosis of coronary artery of native heart without angina pectoris    Rib pain on right side    Memory loss of unknown cause    Malignant neoplasm of female breast    Malignant neoplasm metastatic to bone    Supraventricular tachycardia    Thrombocytopenia    Systolic congestive heart failure    COVID-19    Elevated AST (SGOT)    Hyponatremia    Pacemaker    Sick sinus syndrome    AV block, complete    Cancer associated pain    Dyspnea    Pain in left knee    Syncope, unspecified syncope type    Abnormal radiographic examination    Acute sinusitis    Allergic rhinitis    Constipation    Diarrhea    Nausea    Disorder of aorta    Dizziness and giddiness    Headache    Lack of energy     Past Medical History:  Past Medical History:   Diagnosis Date    Allergic     Bone cancer     METASTATIC BONE    Bradycardia     SECONDARY TO ABLATION    Breast cancer 2017    mets to lymph nodes; did not do radiation    Cancer of unknown origin     Compression fx, thoracic spine, open, initial encounter     Coronary artery disease     COVID-19 09/01/2021    Diabetes mellitus     Heart disease, unspecified     Hepatitis C     RESOLVED WITH MEDICATION    Hyperlipidemia     Hypertension     Obesity (BMI 30-39.9) 02/05/2021    Rib fracture     Per patient    Sleep apnea     no machine    Tachycardia     ATRIAL    Type 2 diabetes mellitus 11/2017     Past Surgical History:  Past Surgical History:   Procedure Laterality Date    BACK SURGERY      neck X 2    BREAST RECONSTRUCTION Bilateral     CARDIAC  ABLATION       atrial tachycardia x 5 ablations     CARDIAC CATHETERIZATION      CARDIAC SURGERY      stent placed in aorta    CARDIAC SURGERY      6 surgeries as baby     CERVICAL FUSION ANTERIOR WITH ARTIFICIAL DISCECTOMY IMPLANTATION N/A 2020    Procedure: C4 VERTEBRECTOMY AND ANTERIOR CERIVCAL DISCECTOMY WITH FUSION OF CERVICAL THREE THROUGH FIVE WITH REMOVAL OF HARDWARE C5-C6;  Surgeon: Mark Kaba MD;  Location: [facility] MAIN OR;  Service: Neurosurgery;  Laterality: N/A;     SECTION      x2    COLONOSCOPY N/A 10/23/2020    Procedure: COLONOSCOPY WITH POLYPECTOMY X6;  Surgeon: Stanley Bourne MD;  Location: [facility] ENDOSCOPY;  Service: Gastroenterology;  Laterality: N/A;  POLYPS, INTERNAL HEMORRHOIDS    ENDOMETRIAL ABLATION      REMOVAL SCAR TISSUE UTERINE    ENDOSCOPY N/A 10/23/2020    Procedure: ESOPHAGOGASTRODUODENOSCOPY WITH BIOPSY X 1 AREA;  Surgeon: Stanley Bourne MD;  Location: [facility] ENDOSCOPY;  Service: Gastroenterology;  Laterality: N/A;  GASTRITIS, ESOPHAGITIS, HIATAL HERNIA    KNEE SURGERY      MASTECTOMY Bilateral     NECK SURGERY      PACEMAKER IMPLANTATION      PAIN PUMP INSERTION/REVISION N/A 2022    Procedure: PAIN PUMP INSERTION AND INTRATHECAL CATHETER PLACEMENT;  Surgeon: Chuck Hunter MD;  Location: [facility] MAIN OR;  Service: Pain Management;  Laterality: N/A;     Social History:  Social History     Socioeconomic History    Marital status: Legally     Number of children: 2   Tobacco Use    Smoking status: Never     Passive exposure: Never    Smokeless tobacco: Never   Vaping Use    Vaping Use: Never used   Substance and Sexual Activity    Alcohol use: Yes     Alcohol/week: 1.0 standard drink of alcohol     Types: 1 Glasses of wine per week     Comment: mixed drink once a week    Drug use: Not Currently    Sexual activity: Defer     Allergies:  Allergies   Allergen Reactions    Promethazine Other (See Comments)     Hyperactive  mean    Tape Other (See Comments)     .blisters       Immunizations:  Immunization History   Administered Date(s) Administered    Tdap 02/25/2022            In-Office Procedure(s):  No orders to display        ASCVD RIsk Score::  The 10-year ASCVD risk score (Kyle SHANKAR, et al., 2019) is: 9.5%    Values used to calculate the score:      Age: 63 years      Sex: Female      Is Non- : No      Diabetic: Yes      Tobacco smoker: No      Systolic Blood Pressure: 134 mmHg      Is BP treated: Yes      HDL Cholesterol: 51 mg/dL      Total Cholesterol: 171 mg/dL    Imaging:    Results for orders placed during the hospital encounter of 07/25/23    XR Foot 3+ View Right    Narrative  XR FOOT 3+ VW RIGHT    Date of Exam: 7/25/2023 10:30 AM EDT    Indication: 2 weeks h/o pain in the right dorsal foot near the base of her toes    Comparison: None available.    Findings:  No acute right foot fracture or joint malalignment is seen. No periostitis or stress/related injury is identified. Osteopenic changes are present. Mild narrowing of the first MTP joint without significant spurring. Tiny plantar calcaneal spur is present.  No retained radiopaque foreign body is seen within the soft tissues.    Impression  Impression:  No acute right foot findings.      Electronically Signed: Kym Antony  7/26/2023 10:10 AM EDT  Workstation ID: [id number]       Results for orders placed during the hospital encounter of 08/07/23    CT Abdomen Pelvis With Contrast    Narrative  CT CHEST W CONTRAST DIAGNOSTIC, CT ABDOMEN PELVIS W CONTRAST    Date of Exam: 8/7/2023 10:46 AM EDT    Indication: Metastatic breast cancer restaging.    Comparison: CT chest 5/12/2023. CT abdomen and pelvis 05/12/2023. Bone scan 8/7/2023.    Technique: Axial CT images were obtained of the chest after the uneventful intravenous administration of iodinated contrast.  Sagittal and coronal reconstructions were performed.  Automated exposure control and  iterative reconstruction methods were used.      Findings:    CHEST:  There is chronic groundglass geographic density within the right middle lobe, lingula, and bilateral lower lobes, unchanged from 5/12/2023, favored to represent areas of chronic scarring/fibrosis. No new airspace disease is identified.    No focal suspicious pulmonary nodule is seen. Benign calcified nodules are present in both lungs.    There is moderate cardiomegaly. Pacemaker device appears unchanged. There is persistent dilation of the pulmonary arteries. Pulmonary valve replacement changes are again noted. There are no pathologic enlarged lymph nodes. Benign calcified mediastinal  and left hilar lymph nodes are seen.    Bilateral mastectomy with reconstruction changes are again noted. No chest wall mass is identified.    There are surgical changes of anterior cervical spinal fusion. Multiple old right rib fractures are redemonstrated.    There is a chronic compression deformity of the T12 vertebral body with mixed lucency and sclerosis. There is a lucent lesion at the posterior margin of the 11 vertebral body which is unchanged. There is mild concavity of the superior endplate of T9  vertebral body which is unchanged. Sclerotic focus in the sternal body is unchanged. No new osseous lesions are identified within the chest.        ABDOMEN AND PELVIS: The liver is steatotic. No focal liver lesions are identified. The gallbladder, spleen, pancreas, adrenals, and right kidney are normal. There is a small nonobstructing left renal stone. Moderate stool burden is seen within the  ascending and transverse colon.    The urinary bladder, uterus, and rectum are within normal limits. There is a tiny fat-containing umbilical hernia. Spinal stimulator device is in place.    Mixed lytic-sclerotic foci involving the proximal left femur, right inferior pubic ramus, left iliac wing,, L2-L5 vertebral bodies, are unchanged. There is a new compression fracture of  the L1 vertebral body with 50% loss of vertebral body height, 2 mm  bony retropulsion but no subluxation. Mixed lucency and sclerosis within the L1 vertebral body appears new or increased in comparison to 5/12/2023.    Impression  1. New/increased osteolytic/osteoblastic change within the L1 vertebral body with new compression deformity since 5/12/2023, consistent with disease progression.  2. Other osseous lesions within the chest, abdomen and pelvis otherwise appear stable.  3. There is no convincing evidence of soft tissue organ metastasis within the chest, abdomen or pelvis.  4. Multiple other chronic findings, as described above    Electronically Signed: Kym Antony  8/7/2023 2:38 PM EDT  Workstation ID: [id number]      Results for orders placed during the hospital encounter of 08/07/23    CT Abdomen Pelvis With Contrast    Narrative  CT CHEST W CONTRAST DIAGNOSTIC, CT ABDOMEN PELVIS W CONTRAST    Date of Exam: 8/7/2023 10:46 AM EDT    Indication: Metastatic breast cancer restaging.    Comparison: CT chest 5/12/2023. CT abdomen and pelvis 05/12/2023. Bone scan 8/7/2023.    Technique: Axial CT images were obtained of the chest after the uneventful intravenous administration of iodinated contrast.  Sagittal and coronal reconstructions were performed.  Automated exposure control and iterative reconstruction methods were used.      Findings:    CHEST:  There is chronic groundglass geographic density within the right middle lobe, lingula, and bilateral lower lobes, unchanged from 5/12/2023, favored to represent areas of chronic scarring/fibrosis. No new airspace disease is identified.    No focal suspicious pulmonary nodule is seen. Benign calcified nodules are present in both lungs.    There is moderate cardiomegaly. Pacemaker device appears unchanged. There is persistent dilation of the pulmonary arteries. Pulmonary valve replacement changes are again noted. There are no pathologic enlarged lymph nodes. Benign  calcified mediastinal  and left hilar lymph nodes are seen.    Bilateral mastectomy with reconstruction changes are again noted. No chest wall mass is identified.    There are surgical changes of anterior cervical spinal fusion. Multiple old right rib fractures are redemonstrated.    There is a chronic compression deformity of the T12 vertebral body with mixed lucency and sclerosis. There is a lucent lesion at the posterior margin of the 11 vertebral body which is unchanged. There is mild concavity of the superior endplate of T9  vertebral body which is unchanged. Sclerotic focus in the sternal body is unchanged. No new osseous lesions are identified within the chest.        ABDOMEN AND PELVIS: The liver is steatotic. No focal liver lesions are identified. The gallbladder, spleen, pancreas, adrenals, and right kidney are normal. There is a small nonobstructing left renal stone. Moderate stool burden is seen within the  ascending and transverse colon.    The urinary bladder, uterus, and rectum are within normal limits. There is a tiny fat-containing umbilical hernia. Spinal stimulator device is in place.    Mixed lytic-sclerotic foci involving the proximal left femur, right inferior pubic ramus, left iliac wing,, L2-L5 vertebral bodies, are unchanged. There is a new compression fracture of the L1 vertebral body with 50% loss of vertebral body height, 2 mm  bony retropulsion but no subluxation. Mixed lucency and sclerosis within the L1 vertebral body appears new or increased in comparison to 5/12/2023.    Impression  1. New/increased osteolytic/osteoblastic change within the L1 vertebral body with new compression deformity since 5/12/2023, consistent with disease progression.  2. Other osseous lesions within the chest, abdomen and pelvis otherwise appear stable.  3. There is no convincing evidence of soft tissue organ metastasis within the chest, abdomen or pelvis.  4. Multiple other chronic findings, as described  above    Electronically Signed: Kym Antony  8/7/2023 2:38 PM EDT  Workstation ID: [id number]      Lab Review:   Lab on 10/09/2023   Component Date Value    WBC 10/09/2023 3.76     RBC 10/09/2023 4.47     Hemoglobin 10/09/2023 12.6     Hematocrit 10/09/2023 39.6     MCV 10/09/2023 88.6     MCH 10/09/2023 28.2     MCHC 10/09/2023 31.8     RDW 10/09/2023 12.8     RDW-SD 10/09/2023 40.6     MPV 10/09/2023 10.2     Platelets 10/09/2023 60 (L)     Neutrophil % 10/09/2023 76.0     Lymphocyte % 10/09/2023 12.8 (L)     Monocyte % 10/09/2023 8.8     Eosinophil % 10/09/2023 2.1     Basophil % 10/09/2023 0.3     Neutrophils, Absolute 10/09/2023 2.86     Lymphocytes, Absolute 10/09/2023 0.48 (L)     Monocytes, Absolute 10/09/2023 0.33     Eosinophils, Absolute 10/09/2023 0.08     Basophils, Absolute 10/09/2023 0.01     Extra Tube 10/09/2023 Hold for add-ons.     Extra Tube 10/09/2023 Hold for add-ons.     Glucose 10/09/2023 136 (H)     BUN 10/09/2023 14     Creatinine 10/09/2023 0.60     Sodium 10/09/2023 140     Potassium 10/09/2023 3.8     Chloride 10/09/2023 106     CO2 10/09/2023 25.0     Calcium 10/09/2023 9.8     Total Protein 10/09/2023 7.7     Albumin 10/09/2023 4.2     ALT (SGPT) 10/09/2023 15     AST (SGOT) 10/09/2023 27     Alkaline Phosphatase 10/09/2023 73     Total Bilirubin 10/09/2023 0.4     Globulin 10/09/2023 3.5     A/G Ratio 10/09/2023 1.2     BUN/Creatinine Ratio 10/09/2023 23.3     Anion Gap 10/09/2023 9.0     eGFR 10/09/2023 102.3    Office Visit on 09/25/2023   Component Date Value    Hemoglobin A1C 09/25/2023 6.20 (H)     Total Cholesterol 09/25/2023 171     Triglycerides 09/25/2023 89     HDL Cholesterol 09/25/2023 51     LDL Cholesterol  09/25/2023 104 (H)     VLDL Cholesterol 09/25/2023 16     LDL/HDL Ratio 09/25/2023 2.00     TSH 09/25/2023 1.280     25 Hydroxy, Vitamin D 09/25/2023 27.6 (L)     Microalbumin, Urine 09/25/2023 1.9    Orders Only on 09/21/2023   Component Date Value    Course ID  09/21/2023 C1     Course Start Date 09/21/2023 7/8/2021 11:06 PM     Course End Date 09/21/2023 9/21/2023  8:46 AM     Course First Treatment D* 09/21/2023 9/5/2023  3:08 PM     Course Last Treatment Da* 09/21/2023 9/18/2023  3:07 PM     Course Elapsed Days 09/21/2023 13     Reference Point ID 09/21/2023 [id number]     Reference Point Dosage G* 09/21/2023 30     Plan ID 09/21/2023 [id number]     Plan Name 09/21/2023 [id number]     Plan Fractions Treated t* 09/21/2023 10     Plan Total Fractions Pre* 09/21/2023 10     Plan Prescribed Dose Per* 09/21/2023 3     Plan Total Prescribed Do* 09/21/2023 3,000     Plan Primary Reference P* 09/21/2023 [id number]    Hospital Outpatient Visit on 09/18/2023   Component Date Value    Course ID 09/18/2023 C1     Course Start Date 09/18/2023 7/8/2021 11:06 PM     Course First Treatment D* 09/18/2023 9/5/2023  3:08 PM     Course Last Treatment Da* 09/18/2023 9/18/2023  3:07 PM     Course Elapsed Days 09/18/2023 13     Reference Point ID 09/18/2023 [email or web address]     Reference Point Dosage G* 09/18/2023 30     Reference Point Session * 09/18/2023 3     Plan ID 09/18/2023 [email or web address]     Plan Fractions Treated t* 09/18/2023 10     Plan Total Fractions Pre* 09/18/2023 10     Plan Prescribed Dose Per* 09/18/2023 3     Plan Total Prescribed Do* 09/18/2023 3,000     Plan Primary Reference P* 09/18/2023 [email or web address]    Hospital Outpatient Visit on 09/15/2023   Component Date Value    Course ID 09/15/2023 C1     Course Start Date 09/15/2023 7/8/2021 11:06 PM     Course First Treatment D* 09/15/2023 9/5/2023  3:08 PM     Course Last Treatment Da* 09/15/2023 9/15/2023  3:22 PM     Course Elapsed Days 09/15/2023 10     Reference Point ID 09/15/2023 [email or web address]     Reference Point Dosage G* 09/15/2023 27     Reference Point Session * 09/15/2023 3     Plan ID 09/15/2023 [email or web address]     Plan Fractions Treated t* 09/15/2023 9     Plan Total Fractions Pre* 09/15/2023 10     Plan Prescribed Dose Per* 09/15/2023 3     Plan Total Prescribed Do*  09/15/2023 3,000     Plan Primary Reference P* 09/15/2023 [email or web address]    Hospital Outpatient Visit on 09/14/2023   Component Date Value    Course ID 09/14/2023 C1     Course Start Date 09/14/2023 7/8/2021 11:06 PM     Course First Treatment D* 09/14/2023 9/5/2023  3:08 PM     Course Last Treatment Da* 09/14/2023 9/14/2023  3:18 PM     Course Elapsed Days 09/14/2023 9     Reference Point ID 09/14/2023 [email or web address]     Reference Point Dosage G* 09/14/2023 24     Reference Point Session * 09/14/2023 3     Plan ID 09/14/2023 [email or web address]     Plan Fractions Treated t* 09/14/2023 8     Plan Total Fractions Pre* 09/14/2023 10     Plan Prescribed Dose Per* 09/14/2023 3     Plan Total Prescribed Do* 09/14/2023 3,000     Plan Primary Reference P* 09/14/2023 [email or web address]    Hospital Outpatient Visit on 09/13/2023   Component Date Value    Course ID 09/13/2023 C1     Course Start Date 09/13/2023 7/8/2021 11:06 PM     Course First Treatment D* 09/13/2023 9/5/2023  3:08 PM     Course Last Treatment Da* 09/13/2023 9/13/2023  4:00 PM     Course Elapsed Days 09/13/2023 8     Reference Point ID 09/13/2023 [email or web address]     Reference Point Dosage G* 09/13/2023 21     Reference Point Session * 09/13/2023 3     Plan ID 09/13/2023 [email or web address]     Plan Fractions Treated t* 09/13/2023 7     Plan Total Fractions Pre* 09/13/2023 10     Plan Prescribed Dose Per* 09/13/2023 3     Plan Total Prescribed Do* 09/13/2023 3,000     Plan Primary Reference P* 09/13/2023 [email or web address]    Hospital Outpatient Visit on 09/12/2023   Component Date Value    Course ID 09/12/2023 C1     Course Start Date 09/12/2023 7/8/2021 11:06 PM     Course First Treatment D* 09/12/2023 9/5/2023  3:08 PM     Course Last Treatment Da* 09/12/2023 9/12/2023  1:47 PM     Course Elapsed Days 09/12/2023 7     Reference Point ID 09/12/2023 [email or web address]     Reference Point Dosage G* 09/12/2023 18     Reference Point Session * 09/12/2023 3     Plan ID 09/12/2023 [email or web address]     Plan Fractions Treated t* 09/12/2023 6     Plan Total  Fractions Pre* 09/12/2023 10     Plan Prescribed Dose Per* 09/12/2023 3     Plan Total Prescribed Do* 09/12/2023 3,000     Plan Primary Reference P* 09/12/2023 [email or web address]    Hospital Outpatient Visit on 09/12/2023   Component Date Value    Glucose 09/12/2023 100 (H)     BUN 09/12/2023 17     Creatinine 09/12/2023 0.65     Sodium 09/12/2023 139     Potassium 09/12/2023 4.3     Chloride 09/12/2023 102     CO2 09/12/2023 29.0     Calcium 09/12/2023 9.2     Total Protein 09/12/2023 6.8     Albumin 09/12/2023 4.2     ALT (SGPT) 09/12/2023 13     AST (SGOT) 09/12/2023 19     Alkaline Phosphatase 09/12/2023 78     Total Bilirubin 09/12/2023 0.4     Globulin 09/12/2023 2.6     A/G Ratio 09/12/2023 1.6     BUN/Creatinine Ratio 09/12/2023 26.2 (H)     Anion Gap 09/12/2023 8.0     eGFR 09/12/2023 100.3     WBC 09/12/2023 3.77     RBC 09/12/2023 4.42     Hemoglobin 09/12/2023 12.8     Hematocrit 09/12/2023 39.4     MCV 09/12/2023 89.1     MCH 09/12/2023 29.0     MCHC 09/12/2023 32.5     RDW 09/12/2023 13.3     RDW-SD 09/12/2023 42.0     MPV 09/12/2023 9.9     Platelets 09/12/2023 100 (L)     Neutrophil % 09/12/2023 73.7     Lymphocyte % 09/12/2023 15.9 (L)     Monocyte % 09/12/2023 8.5     Eosinophil % 09/12/2023 1.6     Basophil % 09/12/2023 0.3     Neutrophils, Absolute 09/12/2023 2.78     Lymphocytes, Absolute 09/12/2023 0.60 (L)     Monocytes, Absolute 09/12/2023 0.32     Eosinophils, Absolute 09/12/2023 0.06     Basophils, Absolute 09/12/2023 0.01    Hospital Outpatient Visit on 09/11/2023   Component Date Value    Course ID 09/11/2023 C1     Course Start Date 09/11/2023 7/8/2021 11:06 PM     Course First Treatment D* 09/11/2023 9/5/2023  3:08 PM     Course Last Treatment Da* 09/11/2023 9/11/2023  3:33 PM     Course Elapsed Days 09/11/2023 6     Reference Point ID 09/11/2023 [email or web address]     Reference Point Dosage G* 09/11/2023 15     Reference Point Session * 09/11/2023 3     Plan ID 09/11/2023 [email or web address]     Plan Fractions Treated t*  09/11/2023 5     Plan Total Fractions Pre* 09/11/2023 10     Plan Prescribed Dose Per* 09/11/2023 3     Plan Total Prescribed Do* 09/11/2023 3,000     Plan Primary Reference P* 09/11/2023 [email or web address]    There may be more visits with results that are not included.     Recent labs reviewed and interpreted for clinical significance and application            Level of Care:           Stanley Lopez MD  10/23/23  .

## 2023-10-26 ENCOUNTER — DOCUMENTATION (OUTPATIENT)
Dept: PHARMACY | Facility: HOSPITAL | Age: 63
End: 2023-10-26
Payer: MEDICARE

## 2023-10-26 NOTE — PROGRESS NOTES
Specialty Pharmacy Note     Called and talk to Gladys about scheduling her initial fill of verzenio with [facility]. She said she haven't answer their phone calls or called them back because she had been busy. 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 She will try to call [facility] this week to schedule her verzenio.    Gregorio Grimm - CARE COORDINATOR  10/26/2023  09:50 EDT

## 2023-11-02 ENCOUNTER — DOCUMENTATION (OUTPATIENT)
Dept: PHARMACY | Facility: HOSPITAL | Age: 63
End: 2023-11-02
Payer: MEDICARE

## 2023-11-02 NOTE — PROGRESS NOTES
Specialty Pharmacy Note    Initial fill of Abemaciclib (Verzenio) 150mg was dispensed from [facility] ([facility]) on 10/30/23 to be delivered on 10/31/23 verified by the rep Rajat Grimm - CARE COORDINATOR  11/2/2023  14:00 EDT

## 2023-11-05 ENCOUNTER — HOSPITAL ENCOUNTER (EMERGENCY)
Facility: HOSPITAL | Age: 63
Discharge: HOME | End: 2023-11-05
Attending: EMERGENCY MEDICINE | Admitting: EMERGENCY MEDICINE
Payer: MEDICARE

## 2023-11-05 ENCOUNTER — APPOINTMENT (OUTPATIENT)
Dept: CT IMAGING | Facility: HOSPITAL | Age: 63
End: 2023-11-05
Payer: MEDICARE

## 2023-11-05 VITALS
RESPIRATION RATE: 16 BRPM | DIASTOLIC BLOOD PRESSURE: 59 MMHG | WEIGHT: 143 LBS | TEMPERATURE: 98.2 F | HEART RATE: 70 BPM | BODY MASS INDEX: 27 KG/M2 | HEIGHT: 61 IN | SYSTOLIC BLOOD PRESSURE: 136 MMHG | OXYGEN SATURATION: 93 %

## 2023-11-05 DIAGNOSIS — R55 SYNCOPE, UNSPECIFIED SYNCOPE TYPE: Primary | ICD-10-CM

## 2023-11-05 LAB
ALBUMIN SERPL-MCNC: 4 G/DL (ref 3.5–5.2)
ALBUMIN/GLOB SERPL: 1.3 G/DL
ALP SERPL-CCNC: 86 U/L (ref 39–117)
ALT SERPL W P-5'-P-CCNC: 15 U/L (ref 1–33)
ANION GAP SERPL CALCULATED.3IONS-SCNC: 10 MMOL/L (ref 5–15)
AST SERPL-CCNC: 25 U/L (ref 1–32)
BACTERIA UR QL AUTO: ABNORMAL /HPF
BASOPHILS # BLD AUTO: 0 10*3/MM3 (ref 0–0.2)
BASOPHILS NFR BLD AUTO: 0.5 % (ref 0–1.5)
BILIRUB SERPL-MCNC: 0.4 MG/DL (ref 0–1.2)
BILIRUB UR QL STRIP: NEGATIVE
BUN SERPL-MCNC: 20 MG/DL (ref 8–23)
BUN/CREAT SERPL: 19 (ref 7–25)
CALCIUM SPEC-SCNC: 9.7 MG/DL (ref 8.6–10.5)
CHLORIDE SERPL-SCNC: 106 MMOL/L (ref 98–107)
CK SERPL-CCNC: 90 U/L (ref 20–180)
CLARITY UR: CLEAR
CO2 SERPL-SCNC: 24 MMOL/L (ref 22–29)
COLOR UR: ABNORMAL
CREAT SERPL-MCNC: 1.05 MG/DL (ref 0.57–1)
DEPRECATED RDW RBC AUTO: 46.8 FL (ref 37–54)
EGFRCR SERPLBLD CKD-EPI 2021: 60.6 ML/MIN/1.73
EOSINOPHIL # BLD AUTO: 0 10*3/MM3 (ref 0–0.4)
EOSINOPHIL NFR BLD AUTO: 1.2 % (ref 0.3–6.2)
ERYTHROCYTE [DISTWIDTH] IN BLOOD BY AUTOMATED COUNT: 14.4 % (ref 12.3–15.4)
GLOBULIN UR ELPH-MCNC: 3 GM/DL
GLUCOSE BLDC GLUCOMTR-MCNC: 47 MG/DL (ref 70–105)
GLUCOSE BLDC GLUCOMTR-MCNC: 69 MG/DL (ref 70–105)
GLUCOSE SERPL-MCNC: 112 MG/DL (ref 65–99)
GLUCOSE UR STRIP-MCNC: NEGATIVE MG/DL
HCT VFR BLD AUTO: 36.9 % (ref 34–46.6)
HGB BLD-MCNC: 12 G/DL (ref 12–15.9)
HGB UR QL STRIP.AUTO: NEGATIVE
HYALINE CASTS UR QL AUTO: ABNORMAL /LPF
KETONES UR QL STRIP: NEGATIVE
LEUKOCYTE ESTERASE UR QL STRIP.AUTO: ABNORMAL
LYMPHOCYTES # BLD AUTO: 0.5 10*3/MM3 (ref 0.7–3.1)
LYMPHOCYTES NFR BLD AUTO: 12.8 % (ref 19.6–45.3)
MAGNESIUM SERPL-MCNC: 2 MG/DL (ref 1.6–2.4)
MCH RBC QN AUTO: 28.4 PG (ref 26.6–33)
MCHC RBC AUTO-ENTMCNC: 32.6 G/DL (ref 31.5–35.7)
MCV RBC AUTO: 86.9 FL (ref 79–97)
MONOCYTES # BLD AUTO: 0.3 10*3/MM3 (ref 0.1–0.9)
MONOCYTES NFR BLD AUTO: 6.8 % (ref 5–12)
NEUTROPHILS NFR BLD AUTO: 2.9 10*3/MM3 (ref 1.7–7)
NEUTROPHILS NFR BLD AUTO: 78.7 % (ref 42.7–76)
NITRITE UR QL STRIP: NEGATIVE
NRBC BLD AUTO-RTO: 0.1 /100 WBC (ref 0–0.2)
PH UR STRIP.AUTO: <=5 [PH] (ref 5–8)
PLATELET # BLD AUTO: 75 10*3/MM3 (ref 140–450)
PMV BLD AUTO: 8 FL (ref 6–12)
POTASSIUM SERPL-SCNC: 4.7 MMOL/L (ref 3.5–5.2)
PROT SERPL-MCNC: 7 G/DL (ref 6–8.5)
PROT UR QL STRIP: NEGATIVE
RBC # BLD AUTO: 4.24 10*6/MM3 (ref 3.77–5.28)
RBC # UR STRIP: ABNORMAL /HPF
REF LAB TEST METHOD: ABNORMAL
SODIUM SERPL-SCNC: 140 MMOL/L (ref 136–145)
SP GR UR STRIP: 1.02 (ref 1–1.03)
SQUAMOUS #/AREA URNS HPF: ABNORMAL /HPF
UROBILINOGEN UR QL STRIP: ABNORMAL
WBC # UR STRIP: ABNORMAL /HPF
WBC NRBC COR # BLD: 3.7 10*3/MM3 (ref 3.4–10.8)

## 2023-11-05 PROCEDURE — 82948 REAGENT STRIP/BLOOD GLUCOSE: CPT

## 2023-11-05 PROCEDURE — 80053 COMPREHEN METABOLIC PANEL: CPT | Performed by: EMERGENCY MEDICINE

## 2023-11-05 PROCEDURE — 70450 CT HEAD/BRAIN W/O DYE: CPT

## 2023-11-05 PROCEDURE — 85025 COMPLETE CBC W/AUTO DIFF WBC: CPT | Performed by: EMERGENCY MEDICINE

## 2023-11-05 PROCEDURE — 99284 EMERGENCY DEPT VISIT MOD MDM: CPT

## 2023-11-05 PROCEDURE — 93005 ELECTROCARDIOGRAM TRACING: CPT | Performed by: EMERGENCY MEDICINE

## 2023-11-05 PROCEDURE — 81001 URINALYSIS AUTO W/SCOPE: CPT | Performed by: EMERGENCY MEDICINE

## 2023-11-05 PROCEDURE — 83735 ASSAY OF MAGNESIUM: CPT | Performed by: EMERGENCY MEDICINE

## 2023-11-05 PROCEDURE — 82550 ASSAY OF CK (CPK): CPT | Performed by: EMERGENCY MEDICINE

## 2023-11-05 RX ORDER — SODIUM CHLORIDE 0.9 % (FLUSH) 0.9 %
10 SYRINGE (ML) INJECTION AS NEEDED
Status: DISCONTINUED | OUTPATIENT
Start: 2023-11-05 | End: 2023-11-05 | Stop reason: HOSPADM

## 2023-11-05 NOTE — ED NOTES
"IV therapy attempted to start an IV,however vein collapsed and would not thread. Pt is refusing to have IV started if \"not necessary.\" Was able to butterfly stick pt's R hand for labs. Dr notified.   "

## 2023-11-05 NOTE — ED PROVIDER NOTES
Subjective   History of Present Illness  Chief complaint passed out    History of present illness 63-year-old female who has history of metastatic breast cancer who started the new chemo pill just a few days ago states she was at work today she has been nauseous but it was getting busy she started a lightheaded and then she passed out briefly.  There was no reported seizure activity.  The patient states that her sugar at the scene was 65 she was brought to the ER for evaluation she denies any specific headache vision change speech difficulty or paralysis no chest pain neck arm jaw pain or shortness of breath no leg pain or swelling.  No black or bloody stool no urinary problems no recent illness flus viruses or vaccinations other than her recent medication for her metastatic breast cancer.      Review of Systems   Constitutional:  Negative for chills and fever.   Eyes:  Negative for photophobia and visual disturbance.   Respiratory:  Negative for chest tightness and shortness of breath.    Cardiovascular:  Negative for chest pain and palpitations.   Gastrointestinal:  Negative for abdominal pain, blood in stool and vomiting.   Genitourinary:  Negative for difficulty urinating and dysuria.   Musculoskeletal:  Negative for back pain and neck pain.   Neurological:  Positive for syncope and light-headedness. Negative for facial asymmetry, speech difficulty and headaches.   Psychiatric/Behavioral:  Negative for confusion.        Past Medical History:   Diagnosis Date    Allergic     Bone cancer     METASTATIC BONE    Bradycardia     SECONDARY TO ABLATION    Breast cancer 2017    mets to lymph nodes; did not do radiation    Cancer of unknown origin     Compression fx, thoracic spine, open, initial encounter     Coronary artery disease     COVID-19 09/01/2021    Diabetes mellitus     Heart disease, unspecified     Hepatitis C     RESOLVED WITH MEDICATION    Hyperlipidemia     Hypertension     Obesity (BMI 30-39.9)  2021    Rib fracture     Per patient    Sleep apnea     no machine    Tachycardia     ATRIAL    Type 2 diabetes mellitus 2017       Allergies   Allergen Reactions    Promethazine Other (See Comments)     Hyperactive mean    Tape Other (See Comments)     .blisters         Past Surgical History:   Procedure Laterality Date    BACK SURGERY      neck X 2    BREAST RECONSTRUCTION Bilateral     CARDIAC ABLATION       atrial tachycardia x 5 ablations     CARDIAC CATHETERIZATION      CARDIAC SURGERY      stent placed in aorta    CARDIAC SURGERY      6 surgeries as baby     CERVICAL FUSION ANTERIOR WITH ARTIFICIAL DISCECTOMY IMPLANTATION N/A 2020    Procedure: C4 VERTEBRECTOMY AND ANTERIOR CERIVCAL DISCECTOMY WITH FUSION OF CERVICAL THREE THROUGH FIVE WITH REMOVAL OF HARDWARE C5-C6;  Surgeon: Mark Kaba MD;  Location: [facility] MAIN OR;  Service: Neurosurgery;  Laterality: N/A;     SECTION      x2    COLONOSCOPY N/A 10/23/2020    Procedure: COLONOSCOPY WITH POLYPECTOMY X6;  Surgeon: Stanley Bourne MD;  Location: [facility] ENDOSCOPY;  Service: Gastroenterology;  Laterality: N/A;  POLYPS, INTERNAL HEMORRHOIDS    ENDOMETRIAL ABLATION      REMOVAL SCAR TISSUE UTERINE    ENDOSCOPY N/A 10/23/2020    Procedure: ESOPHAGOGASTRODUODENOSCOPY WITH BIOPSY X 1 AREA;  Surgeon: Stanley Bourne MD;  Location: [facility] ENDOSCOPY;  Service: Gastroenterology;  Laterality: N/A;  GASTRITIS, ESOPHAGITIS, HIATAL HERNIA    KNEE SURGERY      MASTECTOMY Bilateral     NECK SURGERY      PACEMAKER IMPLANTATION      PAIN PUMP INSERTION/REVISION N/A 2022    Procedure: PAIN PUMP INSERTION AND INTRATHECAL CATHETER PLACEMENT;  Surgeon: Chuck Hunter MD;  Location: [facility] MAIN OR;  Service: Pain Management;  Laterality: N/A;       Family History   Problem Relation Age of Onset    Heart disease Mother     Stroke Mother     Lung cancer Mother     Aneurysm Father     Diabetes Sister     No Known Problems  Brother     No Known Problems Brother     Diabetes type I Half-Sister     Thyroid cancer Half-Sister     Cancer Maternal Aunt     Heart attack Sister     Thyroid disease Sister     No Known Problems Sister        Social History     Socioeconomic History    Marital status: Legally     Number of children: 2   Tobacco Use    Smoking status: Never     Passive exposure: Never    Smokeless tobacco: Never   Vaping Use    Vaping Use: Never used   Substance and Sexual Activity    Alcohol use: Yes     Alcohol/week: 1.0 standard drink of alcohol     Types: 1 Glasses of wine per week     Comment: mixed drink once a week    Drug use: Not Currently    Sexual activity: Defer     Prior to Admission medications    Medication Sig Start Date End Date Taking? Authorizing Provider   Abemaciclib (Verzenio) 150 MG tablet Take  by mouth.  Patient not taking: Reported on 10/17/2023    Provider, MD Magdalene   aspirin 81 MG chewable tablet Chew 1 tablet Daily. 12/24/19   Cedric Duval Jr., MD   Blood Glucose Monitoring Suppl (Accu-Chek [place]i) device Use as instructed   To test blood sugar bid 10/25/21   Angelica Rodriguez MD   Calcium Carbonate-Vit D-Min (Calcium 600+D Plus Minerals) 600-400 MG-UNIT chewable tablet Chew 600 mg 2 (Two) Times a Day. 2/12/21   Мария Nunez MD   Continuous Blood Gluc  (FreeStyle Rajeev 14 Day [place]) device 1 each Daily. 6/19/23   Chirag Robles DO   Continuous Blood Gluc Sensor (FreeStyle Rajeev 14 Day Sensor) misc 1 each Daily. 6/19/23   Chirag Robles DO   exemestane (AROMASIN) 25 MG tablet Take 1 tablet by mouth Daily.  Patient not taking: Reported on 9/25/2023 9/21/23   Мария Nunez MD   famotidine (Pepcid) 20 MG tablet Take 1 tablet by mouth 2 (Two) Times a Day. 9/25/23   Chirag Robles DO   fluticasone (FLONASE) 50 MCG/ACT nasal spray 2 sprays into the nostril(s) as directed by provider Daily. 5/5/23   Chirag Robles DO  "  ibuprofen (ADVIL,MOTRIN) 800 MG tablet Take 1 tablet by mouth Every 8 (Eight) Hours As Needed for Mild Pain. IN BETWEEN PAIN  MEDS  Patient not taking: Reported on 10/10/2023 7/10/23   Chuck Hunter MD   insulin NPH-insulin regular (humuLIN 70/30,novoLIN 70/30) (70-30) 100 UNIT/ML injection Inject 15 Units under the skin into the appropriate area as directed Every 12 (Twelve) Hours.    Provider, MD Magdalene   Insulin Pen Needle (B-D UF III MINI PEN NEEDLES) 31G X 5 MM misc Use to inject insulin twice daily   DX: E11.9 8/22/22   Chirag Robles,    Insulin Syringe-Needle U-100 28G X 1/2\" 1 ML misc 1 each 2 (Two) Times a Day. 11/18/22   Chirag Robles DO   losartan (Cozaar) 50 MG tablet Take 1 tablet by mouth Daily. Discontinue the lisinopril due to interaction with new chemotherapy 9/18/23   Chirag Robles DO   methocarbamol (ROBAXIN) 750 MG tablet Take 1 tablet by mouth 3 (Three) Times a Day As Needed for Muscle Spasms. 10/21/23   Lavern Le APRN   ondansetron ODT (ZOFRAN-ODT) 4 MG disintegrating tablet Place 1 tablet on the tongue Every 8 (Eight) Hours As Needed for Nausea or Vomiting. 9/11/23   Christ Laguerre MD   rosuvastatin (Crestor) 20 MG tablet Take 1 tablet by mouth Every Night.  Patient not taking: Reported on 10/17/2023 3/9/21   Preethi Vasquez APRN            Objective   Physical Exam  Constitutional this is a 63-year-old awake alert triage vital signs reviewed nontoxic-appearing HEENT extraocular muscles are intact pupils equal round reactive no photophobia no raccoon or barnes sign.  Back no direct cervical thoracic lumbar spine tenderness noted no posterior rib tenderness noted.  Neck supple no adenopathy no JV no bruits lungs clear no retraction heart regular without murmur abdomen soft nontender good bowel sounds no peritoneal findings or pulsatile masses extremities pulses equal throughout upper and lower extremities no edema cords or Homans' sign no " evidence of DVT.  Skin warm dry without rashes or cellulitic change neurologic awake alert orientated x3 no facial asymmetry speech normal no drift the arms or legs sits up without difficulty no truncal ataxia no focal weakness.  Procedures           ED Course      Results for orders placed or performed during the hospital encounter of 11/05/23   Comprehensive Metabolic Panel    Specimen: Blood   Result Value Ref Range    Glucose 112 (H) 65 - 99 mg/dL    BUN 20 8 - 23 mg/dL    Creatinine 1.05 (H) 0.57 - 1.00 mg/dL    Sodium 140 136 - 145 mmol/L    Potassium 4.7 3.5 - 5.2 mmol/L    Chloride 106 98 - 107 mmol/L    CO2 24.0 22.0 - 29.0 mmol/L    Calcium 9.7 8.6 - 10.5 mg/dL    Total Protein 7.0 6.0 - 8.5 g/dL    Albumin 4.0 3.5 - 5.2 g/dL    ALT (SGPT) 15 1 - 33 U/L    AST (SGOT) 25 1 - 32 U/L    Alkaline Phosphatase 86 39 - 117 U/L    Total Bilirubin 0.4 0.0 - 1.2 mg/dL    Globulin 3.0 gm/dL    A/G Ratio 1.3 g/dL    BUN/Creatinine Ratio 19.0 7.0 - 25.0    Anion Gap 10.0 5.0 - 15.0 mmol/L    eGFR 60.6 >60.0 mL/min/1.73   Urinalysis With Microscopic If Indicated (No Culture) - Urine, Clean Catch    Specimen: Urine, Clean Catch   Result Value Ref Range    Color, UA Dark Yellow (A) Yellow, Straw    Appearance, UA Clear Clear    pH, UA <=5.0 5.0 - 8.0    Specific Gravity, UA 1.021 1.005 - 1.030    Glucose, UA Negative Negative    Ketones, UA Negative Negative    Bilirubin, UA Negative Negative    Blood, UA Negative Negative    Protein, UA Negative Negative    Leuk Esterase, UA Small (1+) (A) Negative    Nitrite, UA Negative Negative    Urobilinogen, UA 1.0 E.U./dL 0.2 - 1.0 E.U./dL   CK    Specimen: Blood   Result Value Ref Range    Creatine Kinase 90 20 - 180 U/L   Magnesium    Specimen: Blood   Result Value Ref Range    Magnesium 2.0 1.6 - 2.4 mg/dL   CBC Auto Differential    Specimen: Blood   Result Value Ref Range    WBC 3.70 3.40 - 10.80 10*3/mm3    RBC 4.24 3.77 - 5.28 10*6/mm3    Hemoglobin 12.0 12.0 - 15.9 g/dL     Hematocrit 36.9 34.0 - 46.6 %    MCV 86.9 79.0 - 97.0 fL    MCH 28.4 26.6 - 33.0 pg    MCHC 32.6 31.5 - 35.7 g/dL    RDW 14.4 12.3 - 15.4 %    RDW-SD 46.8 37.0 - 54.0 fl    MPV 8.0 6.0 - 12.0 fL    Platelets 75 (L) 140 - 450 10*3/mm3    Neutrophil % 78.7 (H) 42.7 - 76.0 %    Lymphocyte % 12.8 (L) 19.6 - 45.3 %    Monocyte % 6.8 5.0 - 12.0 %    Eosinophil % 1.2 0.3 - 6.2 %    Basophil % 0.5 0.0 - 1.5 %    Neutrophils, Absolute 2.90 1.70 - 7.00 10*3/mm3    Lymphocytes, Absolute 0.50 (L) 0.70 - 3.10 10*3/mm3    Monocytes, Absolute 0.30 0.10 - 0.90 10*3/mm3    Eosinophils, Absolute 0.00 0.00 - 0.40 10*3/mm3    Basophils, Absolute 0.00 0.00 - 0.20 10*3/mm3    nRBC 0.1 0.0 - 0.2 /100 WBC   Urinalysis, Microscopic Only - Urine, Clean Catch    Specimen: Urine, Clean Catch   Result Value Ref Range    RBC, UA 0-2 None Seen, 0-2 /HPF    WBC, UA 6-10 (A) None Seen, 0-2 /HPF    Bacteria, UA None Seen None Seen /HPF    Squamous Epithelial Cells, UA 0-2 None Seen, 0-2 /HPF    Hyaline Casts, UA None Seen None Seen /LPF    Methodology Automated Microscopy    POC Glucose Once    Specimen: Blood   Result Value Ref Range    Glucose 47 (C) 70 - 105 mg/dL   POC Glucose Once    Specimen: Blood   Result Value Ref Range    Glucose 69 (L) 70 - 105 mg/dL   ECG 12 Lead Syncope   Result Value Ref Range    QT Interval 528 ms    QTC Interval 569 ms     CT Head Without Contrast    Result Date: 11/5/2023  No acute intracranial abnormality by head CT. Chronic findings similar to 3/29/2023. Electronically Signed: Jeferson Diaz MD  11/5/2023 1:34 PM EST  Workstation ID: [id number]   Medications - No data to display         EKG my interpretation ventricular paced rhythm at 70 QTc of 569 is an abnormal EKG paced rhythm but unchanged from 3/29/2023                                  Medical Decision Making  Medical decision making.  Patient placed on monitor my review shows a paced rhythm EKG obtained my independent review shows a ventricular paced rhythm  at 70.  It is an abnormal EKG and unchanged from 3/29/2023 paced rhythm.  Patient is a very difficult IV stick.  The patient did not want any further attempts she states that she would do the labs but try to stay away from IV fluids and further IV sticks unless becomes absolutely necessary.  Labs obtained independent reviewed by me.  Comprehensive metabolic profile unremarkable urine was negative CK normal magnesium normal CBC normal.  Initial blood sugar was down to 47.  Patient was given a meal to eat and thinks that drinking her sugar came back at at 112.  Patient remained in a paced rhythm on the monitor.  No other change in and she has 1 month left on her pacemaker she is post begin that replaced.  Underwent a head CT without my independent review I do not see evidence of tumor masses or hemorrhage.  Radiology was unremarkable other than some chronic findings.  At this point we had a discussion about the findings and her multiple issues that can cause syncope.  I do not see evidence here of acute myocardial infarction stroke intracerebral hemorrhage DVT pulmonary embolism aneurysm dissection acute intra-abdominal process sepsis.  Not a complete list of all possibilities and was recommended for overnight observation and further work-up.  The patient refused states she wants to go home she does not want to stay in the hospital she is well aware of the risk and she states she will follow-up in the office she states that sugar 47 is not too bad for her and she is used to handling that.  She was subsequently discharged home and she will return if she has any further problems.  Stable unremarkable ER course.    Problems Addressed:  Syncope, unspecified syncope type: complicated acute illness or injury    Amount and/or Complexity of Data Reviewed  Labs: ordered. Decision-making details documented in ED Course.  Radiology: ordered and independent interpretation performed. Decision-making details documented in ED  Course.  ECG/medicine tests: ordered and independent interpretation performed. Decision-making details documented in ED Course.        Final diagnoses:   Syncope, unspecified syncope type       ED Disposition  ED Disposition       ED Disposition   Discharge    Condition   Stable    Comment   --               Chirag Robles DO  [address] [facility]  [address]  [place] [facility]  [phone]    In 1 day           Medication List      No changes were made to your prescriptions during this visit.            Christ Barrera MD  11/06/23 0018

## 2023-11-05 NOTE — DISCHARGE INSTRUCTIONS
Follow-up primary care doctor tomorrow.  Return for reoccurrence of symptoms severe headache vision change speech difficulty chest pain neck arm jaw pain shortness of breath or any other new or worse problems or concerns return me to the ER.  Rest tonight off for tonight and tomorrow.  Follow-up cardiologist call this week as well.

## 2023-11-06 ENCOUNTER — HOSPITAL ENCOUNTER (EMERGENCY)
Facility: HOSPITAL | Age: 63
Discharge: HOME | End: 2023-11-06
Attending: EMERGENCY MEDICINE | Admitting: EMERGENCY MEDICINE
Payer: MEDICARE

## 2023-11-06 ENCOUNTER — APPOINTMENT (OUTPATIENT)
Dept: RESPIRATORY THERAPY | Facility: HOSPITAL | Age: 63
End: 2023-11-06
Payer: MEDICARE

## 2023-11-06 VITALS
SYSTOLIC BLOOD PRESSURE: 162 MMHG | HEART RATE: 88 BPM | HEIGHT: 61 IN | OXYGEN SATURATION: 99 % | RESPIRATION RATE: 16 BRPM | WEIGHT: 142.2 LBS | DIASTOLIC BLOOD PRESSURE: 70 MMHG | BODY MASS INDEX: 26.85 KG/M2 | TEMPERATURE: 97.6 F

## 2023-11-06 DIAGNOSIS — R55 SYNCOPE, UNSPECIFIED SYNCOPE TYPE: Primary | ICD-10-CM

## 2023-11-06 DIAGNOSIS — T82.111A MALFUNCTION OF CARDIAC PACEMAKER, INITIAL ENCOUNTER: ICD-10-CM

## 2023-11-06 LAB
BASOPHILS # BLD AUTO: 0 10*3/MM3 (ref 0–0.2)
BASOPHILS NFR BLD AUTO: 0.3 % (ref 0–1.5)
DEPRECATED RDW RBC AUTO: 44.6 FL (ref 37–54)
EOSINOPHIL # BLD AUTO: 0 10*3/MM3 (ref 0–0.4)
EOSINOPHIL NFR BLD AUTO: 1.4 % (ref 0.3–6.2)
ERYTHROCYTE [DISTWIDTH] IN BLOOD BY AUTOMATED COUNT: 14.4 % (ref 12.3–15.4)
HCT VFR BLD AUTO: 37.9 % (ref 34–46.6)
HGB BLD-MCNC: 12 G/DL (ref 12–15.9)
HOLD SPECIMEN: NORMAL
INR PPP: 1.07 (ref 0.93–1.1)
LYMPHOCYTES # BLD AUTO: 0.5 10*3/MM3 (ref 0.7–3.1)
LYMPHOCYTES NFR BLD AUTO: 14.5 % (ref 19.6–45.3)
MCH RBC QN AUTO: 28.2 PG (ref 26.6–33)
MCHC RBC AUTO-ENTMCNC: 31.5 G/DL (ref 31.5–35.7)
MCV RBC AUTO: 89.5 FL (ref 79–97)
MONOCYTES # BLD AUTO: 0.2 10*3/MM3 (ref 0.1–0.9)
MONOCYTES NFR BLD AUTO: 5.6 % (ref 5–12)
NEUTROPHILS NFR BLD AUTO: 2.7 10*3/MM3 (ref 1.7–7)
NEUTROPHILS NFR BLD AUTO: 78.2 % (ref 42.7–76)
NRBC BLD AUTO-RTO: 0.1 /100 WBC (ref 0–0.2)
PLATELET # BLD AUTO: 89 10*3/MM3 (ref 140–450)
PMV BLD AUTO: 8.2 FL (ref 6–12)
PROTHROMBIN TIME: 11.6 SECONDS (ref 9.6–11.7)
QT INTERVAL: 528 MS
QTC INTERVAL: 569 MS
RBC # BLD AUTO: 4.24 10*6/MM3 (ref 3.77–5.28)
WBC NRBC COR # BLD: 3.5 10*3/MM3 (ref 3.4–10.8)

## 2023-11-06 PROCEDURE — 85025 COMPLETE CBC W/AUTO DIFF WBC: CPT | Performed by: EMERGENCY MEDICINE

## 2023-11-06 PROCEDURE — 36415 COLL VENOUS BLD VENIPUNCTURE: CPT

## 2023-11-06 PROCEDURE — 93246 EXT ECG>7D<15D RECORDING: CPT

## 2023-11-06 PROCEDURE — 99284 EMERGENCY DEPT VISIT MOD MDM: CPT | Performed by: INTERNAL MEDICINE

## 2023-11-06 PROCEDURE — G0378 HOSPITAL OBSERVATION PER HR: HCPCS

## 2023-11-06 PROCEDURE — 99283 EMERGENCY DEPT VISIT LOW MDM: CPT

## 2023-11-06 PROCEDURE — 93005 ELECTROCARDIOGRAM TRACING: CPT | Performed by: EMERGENCY MEDICINE

## 2023-11-06 PROCEDURE — 93005 ELECTROCARDIOGRAM TRACING: CPT

## 2023-11-06 PROCEDURE — 85610 PROTHROMBIN TIME: CPT | Performed by: EMERGENCY MEDICINE

## 2023-11-06 RX ORDER — NICOTINE POLACRILEX 4 MG
15 LOZENGE BUCCAL
Status: DISCONTINUED | OUTPATIENT
Start: 2023-11-06 | End: 2023-11-06 | Stop reason: HOSPADM

## 2023-11-06 RX ORDER — BISACODYL 5 MG/1
5 TABLET, DELAYED RELEASE ORAL DAILY PRN
Status: CANCELLED | OUTPATIENT
Start: 2023-11-06

## 2023-11-06 RX ORDER — FLUTICASONE PROPIONATE 50 MCG
2 SPRAY, SUSPENSION (ML) NASAL DAILY PRN
COMMUNITY

## 2023-11-06 RX ORDER — SODIUM CHLORIDE 0.9 % (FLUSH) 0.9 %
10 SYRINGE (ML) INJECTION AS NEEDED
Status: DISCONTINUED | OUTPATIENT
Start: 2023-11-06 | End: 2023-11-06 | Stop reason: HOSPADM

## 2023-11-06 RX ORDER — DEXTROSE MONOHYDRATE 25 G/50ML
25 INJECTION, SOLUTION INTRAVENOUS
Status: DISCONTINUED | OUTPATIENT
Start: 2023-11-06 | End: 2023-11-06 | Stop reason: HOSPADM

## 2023-11-06 RX ORDER — FAMOTIDINE 20 MG/1
20 TABLET, FILM COATED ORAL 2 TIMES DAILY
Status: CANCELLED | OUTPATIENT
Start: 2023-11-06

## 2023-11-06 RX ORDER — ENOXAPARIN SODIUM 100 MG/ML
40 INJECTION SUBCUTANEOUS DAILY
Status: CANCELLED | OUTPATIENT
Start: 2023-11-06

## 2023-11-06 RX ORDER — SODIUM CHLORIDE 0.9 % (FLUSH) 0.9 %
10 SYRINGE (ML) INJECTION AS NEEDED
Status: CANCELLED | OUTPATIENT
Start: 2023-11-06

## 2023-11-06 RX ORDER — ASPIRIN 81 MG/1
81 TABLET, CHEWABLE ORAL DAILY
Status: CANCELLED | OUTPATIENT
Start: 2023-11-06

## 2023-11-06 RX ORDER — POLYETHYLENE GLYCOL 3350 17 G/17G
17 POWDER, FOR SOLUTION ORAL DAILY PRN
Status: CANCELLED | OUTPATIENT
Start: 2023-11-06

## 2023-11-06 RX ORDER — AMOXICILLIN 250 MG
2 CAPSULE ORAL 2 TIMES DAILY
Status: CANCELLED | OUTPATIENT
Start: 2023-11-06

## 2023-11-06 RX ORDER — SODIUM CHLORIDE 9 MG/ML
40 INJECTION, SOLUTION INTRAVENOUS AS NEEDED
Status: CANCELLED | OUTPATIENT
Start: 2023-11-06

## 2023-11-06 RX ORDER — INSULIN LISPRO 100 [IU]/ML
2-7 INJECTION, SOLUTION INTRAVENOUS; SUBCUTANEOUS
Status: DISCONTINUED | OUTPATIENT
Start: 2023-11-06 | End: 2023-11-06 | Stop reason: HOSPADM

## 2023-11-06 RX ORDER — FAMOTIDINE 20 MG/1
20 TABLET, FILM COATED ORAL 2 TIMES DAILY PRN
COMMUNITY

## 2023-11-06 RX ORDER — SODIUM CHLORIDE 0.9 % (FLUSH) 0.9 %
10 SYRINGE (ML) INJECTION EVERY 12 HOURS SCHEDULED
Status: CANCELLED | OUTPATIENT
Start: 2023-11-06

## 2023-11-06 RX ORDER — ONDANSETRON 4 MG/1
4 TABLET, ORALLY DISINTEGRATING ORAL EVERY 8 HOURS PRN
Status: CANCELLED | OUTPATIENT
Start: 2023-11-06

## 2023-11-06 RX ORDER — IBUPROFEN 600 MG/1
1 TABLET ORAL
Status: DISCONTINUED | OUTPATIENT
Start: 2023-11-06 | End: 2023-11-06 | Stop reason: HOSPADM

## 2023-11-06 RX ORDER — NITROGLYCERIN 0.4 MG/1
0.4 TABLET SUBLINGUAL
Status: CANCELLED | OUTPATIENT
Start: 2023-11-06

## 2023-11-06 RX ORDER — BISACODYL 10 MG
10 SUPPOSITORY, RECTAL RECTAL DAILY PRN
Status: CANCELLED | OUTPATIENT
Start: 2023-11-06

## 2023-11-06 RX ORDER — METHOCARBAMOL 750 MG/1
750 TABLET, FILM COATED ORAL 3 TIMES DAILY PRN
Status: CANCELLED | OUTPATIENT
Start: 2023-11-06

## 2023-11-06 NOTE — CONSULTS
HP      Name: Gladys Garrison ADMIT: 2023   : 1960  PCP: Chirag Robles DO    MRN: [id number] LOS: 0 days   AGE/SEX: 63 y.o. female  ROOM: [facility]     Chief Complaint   Patient presents with    Pacemaker Problem       Subjective        History of present illness  Gladys Garrison is a 63-year-old male patient who has history of hypertension, dyslipidemia, pulmonary hypertension, tetralogy of Fallot with surgically repaired, complete heart block has a dual-chamber pacemaker, initial implant , has breast cancer which is metastatic, presented to the ER due to concerns for device malfunction with inhibition of pacing occurring on 2023.  In fact the patient was at the office today, she had a syncopal episode and therefore she was sent to the ER.      Past Medical History:   Diagnosis Date    Allergic     Bone cancer     METASTATIC BONE    Bradycardia     SECONDARY TO ABLATION    Breast cancer     mets to lymph nodes; did not do radiation    Cancer of unknown origin     Compression fx, thoracic spine, open, initial encounter     Coronary artery disease     COVID-19 2021    Diabetes mellitus     Heart disease, unspecified     Hepatitis C     RESOLVED WITH MEDICATION    Hyperlipidemia     Hypertension     Obesity (BMI 30-39.9) 2021    Rib fracture     Per patient    Sleep apnea     no machine    Tachycardia     ATRIAL    Type 2 diabetes mellitus 2017     Past Surgical History:   Procedure Laterality Date    BACK SURGERY      neck X 2    BREAST RECONSTRUCTION Bilateral     CARDIAC ABLATION       atrial tachycardia x 5 ablations     CARDIAC CATHETERIZATION      CARDIAC SURGERY      stent placed in aorta    CARDIAC SURGERY      6 surgeries as baby     CERVICAL FUSION ANTERIOR WITH ARTIFICIAL DISCECTOMY IMPLANTATION N/A 2020    Procedure: C4 VERTEBRECTOMY AND ANTERIOR CERIVCAL DISCECTOMY WITH FUSION OF CERVICAL THREE THROUGH FIVE WITH REMOVAL OF HARDWARE C5-C6;   Surgeon: Mark Kaba MD;  Location: [facility] MAIN OR;  Service: Neurosurgery;  Laterality: N/A;     SECTION      x2    COLONOSCOPY N/A 10/23/2020    Procedure: COLONOSCOPY WITH POLYPECTOMY X6;  Surgeon: Stanley Bourne MD;  Location: [facility] ENDOSCOPY;  Service: Gastroenterology;  Laterality: N/A;  POLYPS, INTERNAL HEMORRHOIDS    ENDOMETRIAL ABLATION      REMOVAL SCAR TISSUE UTERINE    ENDOSCOPY N/A 10/23/2020    Procedure: ESOPHAGOGASTRODUODENOSCOPY WITH BIOPSY X 1 AREA;  Surgeon: Stanley Bourne MD;  Location: [facility] ENDOSCOPY;  Service: Gastroenterology;  Laterality: N/A;  GASTRITIS, ESOPHAGITIS, HIATAL HERNIA    KNEE SURGERY      MASTECTOMY Bilateral     NECK SURGERY      PACEMAKER IMPLANTATION      PAIN PUMP INSERTION/REVISION N/A 2022    Procedure: PAIN PUMP INSERTION AND INTRATHECAL CATHETER PLACEMENT;  Surgeon: Chuck Hunter MD;  Location: [facility] MAIN OR;  Service: Pain Management;  Laterality: N/A;     Family History   Problem Relation Age of Onset    Heart disease Mother     Stroke Mother     Lung cancer Mother     Aneurysm Father     Diabetes Sister     No Known Problems Brother     No Known Problems Brother     Diabetes type I Half-Sister     Thyroid cancer Half-Sister     Cancer Maternal Aunt     Heart attack Sister     Thyroid disease Sister     No Known Problems Sister      Social History     Tobacco Use    Smoking status: Never     Passive exposure: Never    Smokeless tobacco: Never   Vaping Use    Vaping Use: Never used   Substance Use Topics    Alcohol use: Yes     Alcohol/week: 1.0 standard drink of alcohol     Types: 1 Glasses of wine per week     Comment: mixed drink once a week    Drug use: Not Currently     (Not in a hospital admission)    Allergies:  Promethazine and Tape    Review of systems    Constitutional: Negative.    Respiratory and cardiovascular: As detailed in HPI section.  Gastrointestinal: Negative for constipation, nausea and  vomiting negative for abdominal distention, abdominal pain and diarrhea.   Genitourinary: Negative for difficulty urinating and flank pain.   Musculoskeletal: Negative for arthralgias, joint swelling and myalgias.   Skin: Negative for color change, rash and wound.   Neurological: Negative for dizziness, syncope, weakness and headaches.   Hematological: Negative for adenopathy.   Psychiatric/Behavioral: Negative for confusion.   All other systems reviewed and are negative.       Physical Exam  VITALS REVIEWED    General:      well developed, in no acute distress.    Head:      normocephalic and atraumatic.    Eyes:      PERRL/EOM intact, conjunctiva and sclera clear with out nystagmus.    Neck:      no masses, thyromegaly,  trachea central with normal respiratory effort and PMI displaced laterally  Lungs:      Clear to auscultation bilaterally  Heart:       Regular rate and rhythm  Msk:      no deformity or scoliosis noted of thoracic or lumbar spine.    Pulses:      pulses normal in all 4 extremities.    Extremities:       No lower extremity edema  Neurologic:      no focal deficits.   alert oriented x3  Skin:      intact without lesions or rashes.    Psych:      alert and cooperative; normal mood and affect; normal attention span and concentration.      Result Review :               Pertinent cardiac workup    EKG 11/6/2023 a-V paced rhythm  Echocardiogram 3/10/2023 ejection fraction 55 to 60% severely reduced RV systolic function severe tricuspid regurgitation.      Assessment and Plan         Syncope    Tetralogy of Fallot    AV block, complete      Gladys Garrison is a 63-year-old female patient was history of tetralogy of Fallot surgically corrected, has complete heart block and has a dual-chamber pacemaker, she also has among other medical problems metastatic breast cancer and diabetes.  Patient had a device interrogation which showed inhibition of pacing which occurred on October 17, 2023.  She also had an  episode of syncope today at the office.  Device interrogation was performed in the ER with the [employer] representative, there was indeed  Inhibition of pacing due to oversensing on the strip in question.  The RV lead however was programmed unipolar sensing and pacing.  Unipolar sensing means that the sensing antagonize between the pacemaker can and the tip electrode which is in the RV apex.  It is not uncommon to have oversensing with unipolar sensing configuration, which could be a variety of signals such as my potentials, VALERIE etc.  The lead was programmed bipolar both sensing and pacing.  Obviously no R waves are sensed, no noise on the lead and threshold was below 1.  So at this time, I do not think there is urgency to replace the lead, the patient has 8 months left on his battery.  When it is time for a generator change out, we could certainly revisit this issue and if needed we could add a new lead potentially 3830 left bundle area pacing lead or BiV upgrade if that is not feasible.  Another way to address this problem, if it occurs again is to increase the sensing threshold on the RV lead to make it less sensitive, since she does not have any native QRSs and all sensing events would technically be oversensed events.    No follow-ups on file.  Patient was given instructions and counseling regarding her condition or for health maintenance advice. Please see specific information pulled into the AVS if appropriate.

## 2023-11-06 NOTE — CASE MANAGEMENT/SOCIAL WORK
Discharge Planning Assessment  [facility]     Patient Name: Gladys Garrison  MRN: [id number]  Today's Date: 11/6/2023    Admit Date: 11/6/2023    Plan: Return home   Discharge Needs Assessment       Row Name 11/06/23 1533       Living Environment    People in Home significant other    Name(s) of People in Home Lavon ex-    Current Living Arrangements home    Potentially Unsafe Housing Conditions none    Primary Care Provided by self;spouse/significant other    Provides Primary Care For no one    Family Caregiver if Needed significant other    Quality of Family Relationships involved;supportive    Able to Return to Prior Arrangements yes       Resource/Environmental Concerns    Transportation Concerns none       Transition Planning    Patient/Family Anticipates Transition to home;home with family    Patient/Family Anticipated Services at Transition home health care  pain pump filled at home, unsure of agency    Transportation Anticipated family or friend will provide  ex-spouse       Discharge Needs Assessment    Readmission Within the Last 30 Days no previous admission in last 30 days    Equipment Currently Used at Home none    Concerns to be Addressed discharge planning                   Discharge Plan       Row Name 11/06/23 1537       Plan    Plan Return home    Plan Comments Spoke with patient at bedside, states lives wit ex-. Confirmed PCP and pharmacy.Denies transportation or medication coverage issues.               Expected Discharge Date and Time       Expected Discharge Date Expected Discharge Time    Nov 7, 2023            Demographic Summary       Row Name 11/06/23 1533       General Information    Admission Type observation    Arrived From home    Required Notices Provided Observation Status Notice    Referral Source patient    Reason for Consult discharge planning    Preferred Language English              Functional Status       Row Name 11/06/23 1533       Functional Status    Usual  Activity Tolerance good    Current Activity Tolerance good       Functional Status, IADL    Medications independent    Meal Preparation independent    Housekeeping independent    Laundry independent    Shopping independent       Mental Status    General Appearance WDL WDL                Patient Forms       Row Name 11/06/23 1526       Patient Forms    Patient Observation Letter Delivered  11/6/23 Registration           Shelli Alston RN   Met with patient in room wearing PPE: mask, face shield/goggles, gloves, gown.      Maintained distance greater than six feet and spent less than 15 minutes in the room.

## 2023-11-06 NOTE — ED NOTES
Called PICC team again and asked for an estimated time and they stated soon, they are backed up right now.

## 2023-11-06 NOTE — PROGRESS NOTES
Remote device transmission received    Patient had an episode noted where there was concern for over sensing on the ventricular lead inhibiting pacing and she is pacemaker dependent.  This occurred on October 17, 2023.    Patient was seen in the emergency room at [facility] with report of a syncopal episode.  It was recommended that she stay overnight but the patient refused    I received a transmission from her [employer] transmission.  Review of this shows an episode from October 17 where she had what appears to be noise on the RV lead with inhibition of pacing.    I attempted to contact the patient with no answer.  We will continue to try to reach her

## 2023-11-06 NOTE — ED PROVIDER NOTES
Subjective   History of Present Illness  63-year-old female presents after had syncopal episode yesterday.  She had evaluation yesterday declined admission.  She was called by her cardiologist today who advised that she need admission for RV lead replacement or reprogramming.  She has had no further syncope no chest pain or shortness of breath.  She is currently being treated for metastatic breast cancer.  Review of Systems    Past Medical History:   Diagnosis Date    Allergic     Bone cancer     METASTATIC BONE    Bradycardia     SECONDARY TO ABLATION    Breast cancer     mets to lymph nodes; did not do radiation    Cancer of unknown origin     Compression fx, thoracic spine, open, initial encounter     Coronary artery disease     COVID-19 2021    Diabetes mellitus     Heart disease, unspecified     Hepatitis C     RESOLVED WITH MEDICATION    Hyperlipidemia     Hypertension     Obesity (BMI 30-39.9) 2021    Rib fracture     Per patient    Sleep apnea     no machine    Tachycardia     ATRIAL    Type 2 diabetes mellitus 2017       Allergies   Allergen Reactions    Promethazine Other (See Comments)     Hyperactive mean    Tape Other (See Comments)     .blisters         Past Surgical History:   Procedure Laterality Date    BACK SURGERY      neck X 2    BREAST RECONSTRUCTION Bilateral     CARDIAC ABLATION       atrial tachycardia x 5 ablations     CARDIAC CATHETERIZATION      CARDIAC SURGERY      stent placed in aorta    CARDIAC SURGERY      6 surgeries as baby     CERVICAL FUSION ANTERIOR WITH ARTIFICIAL DISCECTOMY IMPLANTATION N/A 2020    Procedure: C4 VERTEBRECTOMY AND ANTERIOR CERIVCAL DISCECTOMY WITH FUSION OF CERVICAL THREE THROUGH FIVE WITH REMOVAL OF HARDWARE C5-C6;  Surgeon: Mark Kaba MD;  Location: [facility] MAIN OR;  Service: Neurosurgery;  Laterality: N/A;     SECTION      x2    COLONOSCOPY N/A 10/23/2020    Procedure: COLONOSCOPY WITH POLYPECTOMY X6;  Surgeon:  Stanley Bourne MD;  Location: [facility] ENDOSCOPY;  Service: Gastroenterology;  Laterality: N/A;  POLYPS, INTERNAL HEMORRHOIDS    ENDOMETRIAL ABLATION      REMOVAL SCAR TISSUE UTERINE    ENDOSCOPY N/A 10/23/2020    Procedure: ESOPHAGOGASTRODUODENOSCOPY WITH BIOPSY X 1 AREA;  Surgeon: Stanley Bourne MD;  Location: [facility] ENDOSCOPY;  Service: Gastroenterology;  Laterality: N/A;  GASTRITIS, ESOPHAGITIS, HIATAL HERNIA    KNEE SURGERY  2000    MASTECTOMY Bilateral     NECK SURGERY      PACEMAKER IMPLANTATION      PAIN PUMP INSERTION/REVISION N/A 4/5/2022    Procedure: PAIN PUMP INSERTION AND INTRATHECAL CATHETER PLACEMENT;  Surgeon: Chuck Hunter MD;  Location: [facility] MAIN OR;  Service: Pain Management;  Laterality: N/A;       Family History   Problem Relation Age of Onset    Heart disease Mother     Stroke Mother     Lung cancer Mother     Aneurysm Father     Diabetes Sister     No Known Problems Brother     No Known Problems Brother     Diabetes type I Half-Sister     Thyroid cancer Half-Sister     Cancer Maternal Aunt     Heart attack Sister     Thyroid disease Sister     No Known Problems Sister        Social History     Socioeconomic History    Marital status: Legally     Number of children: 2   Tobacco Use    Smoking status: Never     Passive exposure: Never    Smokeless tobacco: Never   Vaping Use    Vaping Use: Never used   Substance and Sexual Activity    Alcohol use: Yes     Alcohol/week: 1.0 standard drink of alcohol     Types: 1 Glasses of wine per week     Comment: mixed drink once a week    Drug use: Not Currently    Sexual activity: Defer     Prior to Admission medications    Medication Sig Start Date End Date Taking? Authorizing Provider   Abemaciclib (Verzenio) 150 MG tablet Take  by mouth.  Patient not taking: Reported on 10/17/2023    Provider, MD Magdalene   aspirin 81 MG chewable tablet Chew 1 tablet Daily. 12/24/19   Cedric Duval Jr., MD   Blood Glucose  "Monitoring Suppl (Accu-Chek Araceli) device Use as instructed   To test blood sugar bid 10/25/21   Angelica Rodriguez MD   Calcium Carbonate-Vit D-Min (Calcium 600+D Plus Minerals) 600-400 MG-UNIT chewable tablet Chew 600 mg 2 (Two) Times a Day. 2/12/21   Мария Nunez MD   Continuous Blood Gluc  (FreeStyle Rajeev 14 Day Viola) device 1 each Daily. 6/19/23   Chirag Robles DO   Continuous Blood Gluc Sensor (FreeStyle Rajeev 14 Day Sensor) misc 1 each Daily. 6/19/23   Chirag Robles DO   exemestane (AROMASIN) 25 MG tablet Take 1 tablet by mouth Daily.  Patient not taking: Reported on 9/25/2023 9/21/23   Мария Nunez MD   famotidine (Pepcid) 20 MG tablet Take 1 tablet by mouth 2 (Two) Times a Day. 9/25/23   Chirag Robles DO   fluticasone (FLONASE) 50 MCG/ACT nasal spray 2 sprays into the nostril(s) as directed by provider Daily. 5/5/23   Chirag Robles DO   ibuprofen (ADVIL,MOTRIN) 800 MG tablet Take 1 tablet by mouth Every 8 (Eight) Hours As Needed for Mild Pain. IN BETWEEN PAIN  MEDS  Patient not taking: Reported on 10/10/2023 7/10/23   Chuck Hunter MD   insulin NPH-insulin regular (humuLIN 70/30,novoLIN 70/30) (70-30) 100 UNIT/ML injection Inject 15 Units under the skin into the appropriate area as directed Every 12 (Twelve) Hours.    Provider, MD Magdalene   Insulin Pen Needle (B-D UF III MINI PEN NEEDLES) 31G X 5 MM misc Use to inject insulin twice daily   DX: E11.9 8/22/22   Chirag Robles DO   Insulin Syringe-Needle U-100 28G X 1/2\" 1 ML misc 1 each 2 (Two) Times a Day. 11/18/22   Chirag Robles DO   losartan (Cozaar) 50 MG tablet Take 1 tablet by mouth Daily. Discontinue the lisinopril due to interaction with new chemotherapy 9/18/23   Chirag Robles,    methocarbamol (ROBAXIN) 750 MG tablet Take 1 tablet by mouth 3 (Three) Times a Day As Needed for Muscle Spasms. 10/21/23   Lavern Le APRN   ondansetron ODT " (ZOFRAN-ODT) 4 MG disintegrating tablet Place 1 tablet on the tongue Every 8 (Eight) Hours As Needed for Nausea or Vomiting. 9/11/23   Christ Laguerre MD   rosuvastatin (Crestor) 20 MG tablet Take 1 tablet by mouth Every Night.  Patient not taking: Reported on 10/17/2023 3/9/21   Preethi Vasquez APRN           Objective   Physical Exam  63-year-old female awake alert.  Generally well-developed well-nourished.  Pupils equal round to light.  Neck supple chest clear cardiovascular regular rhythm abdomen soft nontender.  Procedures           ED Course                                           Medical Decision Making  Problems Addressed:  Malfunction of cardiac pacemaker, initial encounter: complicated acute illness or injury  Syncope, unspecified syncope type: complicated acute illness or injury    Amount and/or Complexity of Data Reviewed  Labs: ordered.  ECG/medicine tests: ordered.    Risk  Prescription drug management.  Decision regarding hospitalization.    Chart review: Patient had neurosurgery evaluation for lumbar compression fracture metastasis to spinal column last month.  Comorbidity: As per past history   Differential: Pacemaker failure, thrombocytopenia,  My EKG interpretation: Ventricular paced rhythm rate of 73.  Lab: Patient had CK of 90 yesterday comprehensive metabolic panel without significant abnormality.  CBC had shown a platelet count of 75  My Radiology review and interpretation: X-rays not indicated today patient had CT scan of head yesterday which was negative  Discussion/treatment: Patient IV placed.  We will repeat CBC and check INR.  Consult was placed with EP cardiologist .  Patient was discussed with hospitalist.  Will be admitted to their service with cardiology consult  Patient was evaluated using appropriate PPE      Final diagnoses:   Syncope, unspecified syncope type   Malfunction of cardiac pacemaker, initial encounter       ED Disposition  ED Disposition       ED  Disposition   Decision to Admit    Condition   --    Comment   --               No follow-up provider specified.       Medication List      No changes were made to your prescriptions during this visit.            Austin Panda MD  11/06/23 2038

## 2023-11-06 NOTE — ED NOTES
Called PICC team for help with an IV. Er nurses tried with ultrasound and was unsuccessful. Patient states she is often stuck by PICC team.

## 2023-11-06 NOTE — CONSULTS
[facility] [place] [facility]        Subjective:     Encounter Date:11/06/2023      Patient ID: Gladys Garrison is a 63 y.o. female.    Chief Complaint: syncope, concern for device malfunction    Referring Physician: Austin Panda MD     HPI:  Gladys Garrison is a 63 y.o. female who presents after office was notified of concerning device transmission. Ms. Garrison is a patient of Dr. Lopez. She also sees Dr. Hammond for electrophysiology. 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  Last 2D echo March 2023 revealed normal EF 55 to 60% with grade 1 diastolic dysfunction, severely reduced RV systolic function with moderately dilated RV and RA, moderate bileaflet mitral valve thickening with severe TR, mildly elevated pulmonary pressures 35-45, trivial pericardial effusion.  She also has metastatic breast cancer diagnosed 2017 with bone mets diagnosed in 2021, history of bilateral mastectomy.     Ms. Garrison presented to hospital ER 11/5/2023 with syncope. She was advised to stay in hospital but refused admission.    Of note per office documentation- office received [employer] express transmission which shows an episode from October 17 where she had what appears to be noise on the RV lead with inhibition of pacing. She also had an episode noted where there was concern for over sensing on the ventricular lead inhibiting pacing and she is pacemaker dependent.  This occurred on October 17, 2023. Patient was attempted to be contacted but could not reach patient.    Patient was contacted today and instructed to come to ER. She has no acute complaints at this time. She is concerned her PPM battery may need replaced. EP has been consulted.       Past  Surgical History:   Procedure Laterality Date    BACK SURGERY      neck X 2    BREAST RECONSTRUCTION Bilateral     CARDIAC ABLATION       atrial tachycardia x 5 ablations     CARDIAC CATHETERIZATION      CARDIAC SURGERY      stent placed in aorta    CARDIAC SURGERY      6 surgeries as baby     CERVICAL FUSION ANTERIOR WITH ARTIFICIAL DISCECTOMY IMPLANTATION N/A 2020    Procedure: C4 VERTEBRECTOMY AND ANTERIOR CERIVCAL DISCECTOMY WITH FUSION OF CERVICAL THREE THROUGH FIVE WITH REMOVAL OF HARDWARE C5-C6;  Surgeon: Mark Kaba MD;  Location: [facility] MAIN OR;  Service: Neurosurgery;  Laterality: N/A;     SECTION      x2    COLONOSCOPY N/A 10/23/2020    Procedure: COLONOSCOPY WITH POLYPECTOMY X6;  Surgeon: Stanley Bourne MD;  Location: [facility] ENDOSCOPY;  Service: Gastroenterology;  Laterality: N/A;  POLYPS, INTERNAL HEMORRHOIDS    ENDOMETRIAL ABLATION      REMOVAL SCAR TISSUE UTERINE    ENDOSCOPY N/A 10/23/2020    Procedure: ESOPHAGOGASTRODUODENOSCOPY WITH BIOPSY X 1 AREA;  Surgeon: Stanley Bourne MD;  Location: [facility] ENDOSCOPY;  Service: Gastroenterology;  Laterality: N/A;  GASTRITIS, ESOPHAGITIS, HIATAL HERNIA    KNEE SURGERY  2000    MASTECTOMY Bilateral     NECK SURGERY      PACEMAKER IMPLANTATION      PAIN PUMP INSERTION/REVISION N/A 2022    Procedure: PAIN PUMP INSERTION AND INTRATHECAL CATHETER PLACEMENT;  Surgeon: Chuck Hunter MD;  Location: [facility] MAIN OR;  Service: Pain Management;  Laterality: N/A;       Family History   Problem Relation Age of Onset    Heart disease Mother     Stroke Mother     Lung cancer Mother     Aneurysm Father     Diabetes Sister     No Known Problems Brother     No Known Problems Brother     Diabetes type I Half-Sister     Thyroid cancer Half-Sister     Cancer Maternal Aunt     Heart attack Sister     Thyroid disease Sister     No Known Problems Sister        Social History     Socioeconomic History    Marital status: Legally  "    Number of children: 2   Tobacco Use    Smoking status: Never     Passive exposure: Never    Smokeless tobacco: Never   Vaping Use    Vaping Use: Never used   Substance and Sexual Activity    Alcohol use: Yes     Alcohol/week: 1.0 standard drink of alcohol     Types: 1 Glasses of wine per week     Comment: mixed drink once a week    Drug use: Not Currently    Sexual activity: Defer         Allergies   Allergen Reactions    Promethazine Other (See Comments)     Hyperactive mean    Tape Other (See Comments)     .blisters         Current Medications:   Scheduled Meds:insulin lispro, 2-7 Units, Subcutaneous, 4x Daily AC & at Bedtime      Continuous Infusions:     Review of Systems   Constitutional: Negative for chills and fever.   HENT:  Negative for ear discharge and nosebleeds.    Eyes:  Negative for discharge and redness.   Cardiovascular:  Positive for near-syncope and syncope. Negative for chest pain, orthopnea, palpitations and paroxysmal nocturnal dyspnea.   Respiratory:  Positive for shortness of breath. Negative for cough and wheezing.    Endocrine: Negative for heat intolerance.   Skin:  Negative for rash.   Musculoskeletal:  Negative for arthritis and myalgias.   Gastrointestinal:  Negative for abdominal pain, melena, nausea and vomiting.   Genitourinary:  Negative for dysuria and hematuria.   Neurological:  Negative for dizziness, light-headedness, numbness and tremors.   Psychiatric/Behavioral:  Negative for depression. The patient is not nervous/anxious.             Objective:         /87   Pulse 87   Temp 98 °F (36.7 °C)   Resp 18   Ht 154.9 cm (61\")   Wt 64.5 kg (142 lb 3.2 oz)   LMP  (LMP Unknown)   SpO2 100%   BMI 26.87 kg/m²     Physical Exam:  General Appearance:    Alert, cooperative, in no acute distress                                Head: Atraumatic, normocephalic, PERRLA               Neck:   supple, trachea midline, no thyromegaly, no carotid bruit, no JVD   Lungs:     " "Clear to auscultation,respirations regular, even and               unlabored    Heart:    Regular rhythm and normal rate, normal S1 and S2   Abdomen:     Normal bowel sounds, no masses, no organomegaly, soft  nontender, nondistended, no guarding, no rebound  tenderness   Extremities:   Moves all extremities well, no edema, no cyanosis, no  redness   Pulses:   Pulses palpable and equal bilaterally   Skin:   No bleeding, bruising or rash   Neurologic:   Awake, alert, oriented x3                 ASCVD Risk Score::  The 10-year ASCVD risk score (Kyle SHANKAR, et al., 2019) is: 15.1%    Values used to calculate the score:      Age: 63 years      Sex: Female      Is Non- : No      Diabetic: Yes      Tobacco smoker: No      Systolic Blood Pressure: 171 mmHg      Is BP treated: Yes      HDL Cholesterol: 51 mg/dL      Total Cholesterol: 171 mg/dL      Lab Review:     Results from last 7 days   Lab Units 11/05/23  1405   SODIUM mmol/L 140   POTASSIUM mmol/L 4.7   CHLORIDE mmol/L 106   CO2 mmol/L 24.0   BUN mg/dL 20   CREATININE mg/dL 1.05*   GLUCOSE mg/dL 112*   CALCIUM mg/dL 9.7   AST (SGOT) U/L 25   ALT (SGPT) U/L 15     Results from last 7 days   Lab Units 11/05/23  1405   CK TOTAL U/L 90     Results from last 7 days   Lab Units 11/05/23  1405   WBC 10*3/mm3 3.70   HEMOGLOBIN g/dL 12.0   HEMATOCRIT % 36.9   PLATELETS 10*3/mm3 75*         Results from last 7 days   Lab Units 11/05/23  1405   MAGNESIUM mg/dL 2.0           Invalid input(s): \"LDLCALC\"            Recent Radiology:  Imaging Results (Most Recent)       None              ECHOCARDIOGRAM:    Results for orders placed during the hospital encounter of 03/07/23    Adult Transthoracic Echo Complete W/ Cont if Necessary Per Protocol    Interpretation Summary    Left ventricular systolic function is normal. Left ventricular ejection fraction appears to be 56 - 60%.    Left ventricular diastolic function is consistent with (grade Ia w/high LAP) " impaired relaxation.    Severely reduced right ventricular systolic function noted.    The right ventricular cavity is moderately dilated.    The right atrial cavity is moderate to severely  dilated.    There is moderate, bileaflet mitral valve thickening present.    Severe tricuspid valve regurgitation is present.    Estimated right ventricular systolic pressure from tricuspid regurgitation is mildly elevated (35-45 mmHg).    Mild pulmonary hypertension is present.    There is a trivial pericardial effusion. There is no evidence of cardiac tamponade.                  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      Plan:   Patient admitted to hospitalist  Consult electrophysiology     Patient is seen and examined and findings are verified.  All data is reviewed by me personally.  Assessment and plan formulated by APC was done after discussion with attending.  I spent more than 50% of time in taking care of the patient.    Patient is presented with syncope.  Her syncope was not related to any bradycardia arrhythmia noted on her pacemaker.  However patient had oversensing.  She was asked to come to the hospital    Hemodynamics are stable    Normal S1 and S2.  No pericardial rub or murmur abdominal exam is benign    Her recent echocardiogram showed preserved left ventricular function and no significant valvular heart disease.    I discussed with Dr. Katherine Lucero.  She was programmed unipolar.  We have changed  the programming to bipolar.  She is having dual-chamber pacing now.  However I will proceed with 14-day Zio patch event monitor MCOT.  Patient will follow-up with Telma Flores as a outpatient in 2 weeks.    Electronically signed by Ranjit Carrasquillo MD, 11/06/23, 4:52 PM EST.             CIRO Fuentes  11/06/23  14:27 EST

## 2023-11-06 NOTE — ED NOTES
Spoke to DR. Carrasquillo and he stated patient needs a 2 week holter monitor when discharging and to follow up with NP in 2 weeks.

## 2023-11-06 NOTE — Clinical Note
Level of Care: Med/Surg [1]   Diagnosis: Syncope [206001]   Admitting Physician: CANDE HOPPER [[id number]]

## 2023-11-06 NOTE — H&P
[facility]   HISTORY AND PHYSICAL    Patient Name: Gladys Garrison  : 1960  MRN: [id number]  Primary Care Physician:  Chirag Robles DO  Date of admission: 2023  Service Date and time: 23 14:21 EST  Subjective   Subjective     Chief Complaint: syncope    HPI:    Gladys Garrison is a 63 y.o. female with past medical history significant for breast cancer with metastasis, diabetes mellitus type 2, hyperlipidemia and hypertension history of pacemaker placement who presented to the hospital earlier today after sustaining a syncopal episode yesterday apparently she was called by her cardiologist earlier today and was advised to come in for.  RV lead replacement will reprogramming.  Patient denied any chest pain or shortness of breath, denies any syncopal episode today.    Review of Systems   All systems were reviewed and negative except for: HPI    Personal History     Past Medical History:   Diagnosis Date    Allergic     Bone cancer     METASTATIC BONE    Bradycardia     SECONDARY TO ABLATION    Breast cancer     mets to lymph nodes; did not do radiation    Cancer of unknown origin     Compression fx, thoracic spine, open, initial encounter     Coronary artery disease     COVID-19 2021    Diabetes mellitus     Heart disease, unspecified     Hepatitis C     RESOLVED WITH MEDICATION    Hyperlipidemia     Hypertension     Obesity (BMI 30-39.9) 2021    Rib fracture     Per patient    Sleep apnea     no machine    Tachycardia     ATRIAL    Type 2 diabetes mellitus 2017       Past Surgical History:   Procedure Laterality Date    BACK SURGERY      neck X 2    BREAST RECONSTRUCTION Bilateral     CARDIAC ABLATION       atrial tachycardia x 5 ablations     CARDIAC CATHETERIZATION      CARDIAC SURGERY      stent placed in aorta    CARDIAC SURGERY      6 surgeries as baby     CERVICAL FUSION ANTERIOR WITH ARTIFICIAL DISCECTOMY IMPLANTATION N/A 2020    Procedure: C4  VERTEBRECTOMY AND ANTERIOR CERIVCAL DISCECTOMY WITH FUSION OF CERVICAL THREE THROUGH FIVE WITH REMOVAL OF HARDWARE C5-C6;  Surgeon: Mark Kaba MD;  Location: [facility] MAIN OR;  Service: Neurosurgery;  Laterality: N/A;     SECTION      x2    COLONOSCOPY N/A 10/23/2020    Procedure: COLONOSCOPY WITH POLYPECTOMY X6;  Surgeon: Stanley Bourne MD;  Location: [facility] ENDOSCOPY;  Service: Gastroenterology;  Laterality: N/A;  POLYPS, INTERNAL HEMORRHOIDS    ENDOMETRIAL ABLATION      REMOVAL SCAR TISSUE UTERINE    ENDOSCOPY N/A 10/23/2020    Procedure: ESOPHAGOGASTRODUODENOSCOPY WITH BIOPSY X 1 AREA;  Surgeon: Stanley Bourne MD;  Location: [facility] ENDOSCOPY;  Service: Gastroenterology;  Laterality: N/A;  GASTRITIS, ESOPHAGITIS, HIATAL HERNIA    KNEE SURGERY      MASTECTOMY Bilateral     NECK SURGERY      PACEMAKER IMPLANTATION      PAIN PUMP INSERTION/REVISION N/A 2022    Procedure: PAIN PUMP INSERTION AND INTRATHECAL CATHETER PLACEMENT;  Surgeon: Chuck Hunter MD;  Location: [facility] MAIN OR;  Service: Pain Management;  Laterality: N/A;       Family History: family history includes Aneurysm in her father; Cancer in her maternal aunt; Diabetes in her sister; Diabetes type I in her half-sister; Heart attack in her sister; Heart disease in her mother; Lung cancer in her mother; No Known Problems in her brother, brother, and sister; Stroke in her mother; Thyroid cancer in her half-sister; Thyroid disease in her sister. Otherwise pertinent FHx was reviewed and not pertinent to current issue.    Social History:  reports that she has never smoked. She has never been exposed to tobacco smoke. She has never used smokeless tobacco. She reports current alcohol use of about 1.0 standard drink of alcohol per week. She reports that she does not currently use drugs.    Home Medications:  Accu-Chek Araceli, Calcium 600+D Plus Minerals, FreeStyle Rajeev 14 Day [place], FreeStyle Rajeev 14 Day  Sensor, Insulin Pen Needle, Insulin Syringe-Needle U-100, aspirin, famotidine, fluticasone, ibuprofen, insulin NPH-insulin regular, losartan, methocarbamol, ondansetron ODT, and rosuvastatin      Allergies:  Allergies   Allergen Reactions    Promethazine Other (See Comments)     Hyperactive mean    Tape Other (See Comments)     .blisters         Objective   Objective     Vitals:   Temp:  [98 °F (36.7 °C)] 98 °F (36.7 °C)  Heart Rate:  [87] 87  Resp:  [18] 18  BP: (171)/(87) 171/87  Physical Exam    Constitutional: Awake, alert in no distress   Eyes: PERRLA, sclerae anicteric, no conjunctival injection   HENT: NCAT, mucous membranes moist   Neck: Supple, no thyromegaly, no lymphadenopathy, trachea midline   Respiratory: Clear to auscultation bilaterally, nonlabored respirations    Cardiovascular: RRR, no murmurs, rubs, or gallops, palpable pedal pulses bilaterally   Gastrointestinal: Positive bowel sounds, soft, nontender, nondistended   Musculoskeletal: No bilateral ankle edema, no clubbing or cyanosis to extremities   Psychiatric: Appropriate affect, cooperative   Neurologic: Oriented x 3, strength symmetric in all extremities, Cranial Nerves grossly intact to confrontation, speech clear   Skin: No rashes     Result Review    Result Review:  I have personally reviewed the results from the time of this admission to 11/6/2023 14:21 EST and agree with these findings:  [x]  Laboratory list / accordion  []  Microbiology  []  Radiology  []  EKG/Telemetry   []  Cardiology/Vascular   []  Pathology  []  Old records  []  Other:  Most notable findings include: Glucose 112, BUN 20, creatinine 1.05, hemoglobin 12, hematocrit 36.9, urinalysis dark yellow, small leukocyte esterase.      Assessment & Plan   Assessment / Plan       Active Hospital Problems:  Active Hospital Problems    Diagnosis     **Syncope      Assessment:  This is a 63-year-old lady who presented to the hospital with  1.Syncope  2.  History of pacemaker  placement  3.  History of metastatic breast carcinoma    Plan:    The patient will be kept in observation, cardiology consultation was placed patient to have the RV lead revision.  Resume home medication once available.      DVT prophylaxis:  No DVT prophylaxis order currently exists.    CODE STATUS:       Admission Status:  I believe this patient meets observation status.    Alfreod Long MD

## 2023-11-06 NOTE — NURSING NOTE
Pt to be discharged home after holter monitor is placed, cardiology called for placement, pt updated on plan of care

## 2023-11-07 LAB
QT INTERVAL: 481 MS
QTC INTERVAL: 530 MS

## 2023-11-14 ENCOUNTER — OFFICE VISIT (OUTPATIENT)
Dept: CARDIOLOGY | Facility: CLINIC | Age: 63
End: 2023-11-14
Payer: MEDICARE

## 2023-11-14 ENCOUNTER — CLINICAL SUPPORT NO REQUIREMENTS (OUTPATIENT)
Dept: CARDIOLOGY | Facility: CLINIC | Age: 63
End: 2023-11-14
Payer: MEDICARE

## 2023-11-14 VITALS
OXYGEN SATURATION: 97 % | DIASTOLIC BLOOD PRESSURE: 86 MMHG | HEART RATE: 97 BPM | BODY MASS INDEX: 26.81 KG/M2 | HEIGHT: 61 IN | WEIGHT: 142 LBS | SYSTOLIC BLOOD PRESSURE: 163 MMHG

## 2023-11-14 DIAGNOSIS — Z95.0 PACEMAKER: ICD-10-CM

## 2023-11-14 DIAGNOSIS — I49.5 SICK SINUS SYNDROME: ICD-10-CM

## 2023-11-14 DIAGNOSIS — I10 PRIMARY HYPERTENSION: Chronic | ICD-10-CM

## 2023-11-14 DIAGNOSIS — I44.2 AV BLOCK, COMPLETE: Primary | ICD-10-CM

## 2023-11-14 PROCEDURE — 3079F DIAST BP 80-89 MM HG: CPT | Performed by: INTERNAL MEDICINE

## 2023-11-14 PROCEDURE — 1160F RVW MEDS BY RX/DR IN RCRD: CPT | Performed by: INTERNAL MEDICINE

## 2023-11-14 PROCEDURE — 3077F SYST BP >= 140 MM HG: CPT | Performed by: INTERNAL MEDICINE

## 2023-11-14 PROCEDURE — 99213 OFFICE O/P EST LOW 20 MIN: CPT | Performed by: INTERNAL MEDICINE

## 2023-11-14 PROCEDURE — 1159F MED LIST DOCD IN RCRD: CPT | Performed by: INTERNAL MEDICINE

## 2023-11-14 RX ORDER — EXEMESTANE 25 MG/1
25 TABLET ORAL DAILY
COMMUNITY

## 2023-11-14 NOTE — PROGRESS NOTES
Progress note      Name: Gladys Garrison ADMIT: (Not on file)   : 1960  PCP: Chirag Robles DO    MRN: [id number] LOS: 0 days   AGE/SEX: 63 y.o. female  ROOM: Room/bed info not found     Chief Complaint   Patient presents with    Consult     PER  - GEN LANDON       Subjective       History of present illness  Gladys Garrison is a 63-year-old female patient who has history of hypertension, dyslipidemia, pulm hypertension, pathology of Fallot, surgically repaired, patient has had up to 8 open heart surgeries in her youth, she does have complete heart block and has a dual-chamber pacemaker initially implanted in .  She has a breast cancer which is stage IV and metastatic to the spine.    Patient was seen in the ER earlier due to concerns for device malfunction with inhibition of pacing on 2023.    Past Medical History:   Diagnosis Date    Allergic     Bone cancer     METASTATIC BONE    Bradycardia     SECONDARY TO ABLATION    Breast cancer     mets to lymph nodes; did not do radiation    Cancer of unknown origin     Compression fx, thoracic spine, open, initial encounter     Coronary artery disease     COVID-19 2021    Diabetes mellitus     Heart disease, unspecified     Hepatitis C     RESOLVED WITH MEDICATION    Hyperlipidemia     Hypertension     Obesity (BMI 30-39.9) 2021    Rib fracture     Per patient    Sleep apnea     no machine    Tachycardia     ATRIAL    Type 2 diabetes mellitus 2017     Past Surgical History:   Procedure Laterality Date    BACK SURGERY      neck X 2    BREAST RECONSTRUCTION Bilateral     CARDIAC ABLATION       atrial tachycardia x 5 ablations     CARDIAC CATHETERIZATION      CARDIAC SURGERY      stent placed in aorta    CARDIAC SURGERY      6 surgeries as baby     CERVICAL FUSION ANTERIOR WITH ARTIFICIAL DISCECTOMY IMPLANTATION N/A 2020    Procedure: C4 VERTEBRECTOMY AND ANTERIOR CERIVCAL DISCECTOMY WITH FUSION OF CERVICAL  THREE THROUGH FIVE WITH REMOVAL OF HARDWARE C5-C6;  Surgeon: Mark Kaba MD;  Location: [facility] MAIN OR;  Service: Neurosurgery;  Laterality: N/A;     SECTION      x2    COLONOSCOPY N/A 10/23/2020    Procedure: COLONOSCOPY WITH POLYPECTOMY X6;  Surgeon: Stanley Bourne MD;  Location: [facility] ENDOSCOPY;  Service: Gastroenterology;  Laterality: N/A;  POLYPS, INTERNAL HEMORRHOIDS    ENDOMETRIAL ABLATION      REMOVAL SCAR TISSUE UTERINE    ENDOSCOPY N/A 10/23/2020    Procedure: ESOPHAGOGASTRODUODENOSCOPY WITH BIOPSY X 1 AREA;  Surgeon: Stanley Bourne MD;  Location: [facility] ENDOSCOPY;  Service: Gastroenterology;  Laterality: N/A;  GASTRITIS, ESOPHAGITIS, HIATAL HERNIA    KNEE SURGERY      MASTECTOMY Bilateral     NECK SURGERY      PACEMAKER IMPLANTATION      PAIN PUMP INSERTION/REVISION N/A 2022    Procedure: PAIN PUMP INSERTION AND INTRATHECAL CATHETER PLACEMENT;  Surgeon: Chuck Hunter MD;  Location: [facility] MAIN OR;  Service: Pain Management;  Laterality: N/A;     Family History   Problem Relation Age of Onset    Heart disease Mother     Stroke Mother     Lung cancer Mother     Aneurysm Father     Diabetes Sister     No Known Problems Brother     No Known Problems Brother     Diabetes type I Half-Sister     Thyroid cancer Half-Sister     Cancer Maternal Aunt     Heart attack Sister     Thyroid disease Sister     No Known Problems Sister      Social History     Tobacco Use    Smoking status: Never     Passive exposure: Never    Smokeless tobacco: Never   Vaping Use    Vaping Use: Never used   Substance Use Topics    Alcohol use: Yes     Alcohol/week: 1.0 standard drink of alcohol     Types: 1 Glasses of wine per week     Comment: mixed drink once a week    Drug use: Not Currently       Current Outpatient Medications:     abemaciclib (VERZENIO) 200 mg tablet chemo tablet, Take 1 tablet by mouth 2 (Two) Times a Day., Disp: , Rfl:     aspirin 81 MG chewable tablet, Chew 1  "tablet Daily., Disp: 30 tablet, Rfl: 1    Blood Glucose Monitoring Suppl (Accu-Chek Araceli) device, Use as instructed   To test blood sugar bid, Disp: 1 each, Rfl: 0    Calcium Carbonate-Vit D-Min (Calcium 600+D Plus Minerals) 600-400 MG-UNIT chewable tablet, Chew 600 mg 2 (Two) Times a Day., Disp: 60 each, Rfl: 6    Continuous Blood Gluc  (FreeStyle Rajeev 14 Day [place]) device, 1 each Daily., Disp: 1 each, Rfl: 0    Continuous Blood Gluc Sensor (FreeStyle Rajeev 14 Day Sensor) misc, 1 each Daily., Disp: 6 each, Rfl: 3    exemestane (AROMASIN) 25 MG tablet, Take 1 tablet by mouth Daily., Disp: , Rfl:     famotidine (PEPCID) 20 MG tablet, Take 1 tablet by mouth 2 (Two) Times a Day As Needed for Heartburn., Disp: , Rfl:     fluticasone (FLONASE) 50 MCG/ACT nasal spray, 2 sprays into the nostril(s) as directed by provider Daily As Needed for Rhinitis., Disp: , Rfl:     ibuprofen (ADVIL,MOTRIN) 800 MG tablet, Take 1 tablet by mouth Every 8 (Eight) Hours As Needed for Mild Pain. IN BETWEEN PAIN  MEDS, Disp: 90 tablet, Rfl: 1    insulin NPH-insulin regular (humuLIN 70/30,novoLIN 70/30) (70-30) 100 UNIT/ML injection, Inject 10 Units under the skin into the appropriate area as directed Every 12 (Twelve) Hours., Disp: , Rfl:     Insulin Pen Needle (B-D UF III MINI PEN NEEDLES) 31G X 5 MM misc, Use to inject insulin twice daily   DX: E11.9, Disp: 100 each, Rfl: 0    Insulin Syringe-Needle U-100 28G X 1/2\" 1 ML misc, 1 each 2 (Two) Times a Day., Disp: 100 each, Rfl: 6    losartan (Cozaar) 50 MG tablet, Take 1 tablet by mouth Daily. Discontinue the lisinopril due to interaction with new chemotherapy, Disp: 90 tablet, Rfl: 1    methocarbamol (ROBAXIN) 750 MG tablet, Take 1 tablet by mouth 3 (Three) Times a Day As Needed for Muscle Spasms., Disp: 15 tablet, Rfl: 0    ondansetron ODT (ZOFRAN-ODT) 4 MG disintegrating tablet, Place 1 tablet on the tongue Every 8 (Eight) Hours As Needed for Nausea or Vomiting., Disp: 20 tablet, " Rfl: 2    rosuvastatin (Crestor) 20 MG tablet, Take 1 tablet by mouth Every Night., Disp: 90 tablet, Rfl: 0  Allergies:  Promethazine and Tape      Physical Exam  VITALS REVIEWED    General:      well developed, in no acute distress.    Head:      normocephalic and atraumatic.    Eyes:      PERRL/EOM intact, conjunctiva and sclera clear with out nystagmus.    Neck:      no masses, thyromegaly,  trachea central with normal respiratory effort and PMI displaced laterally  Lungs:      Clear to auscultation bilaterally  Heart:       Regular rate and rhythm  Msk:      no deformity or scoliosis noted of thoracic or lumbar spine.    Pulses:      pulses normal in all 4 extremities.    Extremities:       No lower extremity edema  Neurologic:      no focal deficits.   alert oriented x3  Skin:      intact without lesions or rashes.    Psych:      alert and cooperative; normal mood and affect; normal attention span and concentration.      Result Review :               Pertinent cardiac workup    EKG 11/6/2023 a-V paced rhythm  Echocardiogram 3/10/2023 ejection fraction 55 to 60% severely reduced RV systolic function severe tricuspid regurgitation.      Procedures        Assessment and Plan      Gladys Garrison is a 63-year-old female patient who was tetralogy of Fallot and has had multiple surgeries in her youth.  She has complete heart block and has a dual-chamber pacemaker originally implanted in 2007.  Patient also has breast cancer which is metastatic to the spine.    Patient was seen in the ER on November 6, 2023, at that time there was a strip from the device where it showed lack of capture with oversensing on the RV channel.  It was however determined that it was due to unipolar sensing configuration and therefore the RV lead was programmed bipolar both sensing and pacing.  Today device interrogation was performed the threshold on the RV lead was less than 1.  No R waves sensed as she is in complete heart block, no noise  either.  No inhibition of pacing.  Sensing on the atrial channel was very low, possibly due to a triopathy.  Battery life is about 8 months to TREY.  I think at this time best course of action is to wait and proceed with generator change out whenever it is time.  I am not entirely sure BiV upgrade would be a good idea in her case due to her metastatic disease.  Patient does not seem to be in CHF.  This however can be certainly reconsidered when it is time for a generator change out.    I will bring her back for follow-up in 4 months.    Diagnoses and all orders for this visit:    1. AV block, complete (Primary)    2. Pacemaker    3. Primary hypertension           Return in about 4 months (around 3/14/2024).  Patient was given instructions and counseling regarding her condition or for health maintenance advice. Please see specific information pulled into the AVS if appropriate.

## 2023-11-16 ENCOUNTER — PATIENT ROUNDING (BHMG ONLY) (OUTPATIENT)
Dept: CARDIOLOGY | Facility: CLINIC | Age: 63
End: 2023-11-16
Payer: MEDICARE

## 2023-11-16 NOTE — PROGRESS NOTES
A My-Chart message has been sent to the patient for PATIENT ROUNDING with [facility] [place]

## 2023-11-17 NOTE — PROGRESS NOTES
Hematology/Oncology Outpatient Follow Up    PATIENT NAME:Gladys Garrison  :1960  MRN: [id number]  PRIMARY CARE PHYSICIAN: Chirag Robles DO  REFERRING PHYSICIAN: No ref. provider found    Chief Complaint   Patient presents with    Follow-up     Malignant neoplasm of female breast        HISTORY OF PRESENT ILLNESS:     This is a 63-year-old female who multiple comorbidities including congenital abnormalities such as hypoplastic kidney, tetralogy of Fallot, coarctation of the aorta and congenital VSD status post repair.  Patient developed syncopal episode and for that reason she had she had a CT scan of the chest which showed mass in the left breast.  She had diagnostic mammogram and ultrasound which showed a 2 cm spiculated mass in the left breast at the 1 o'clock position 6 cm from the nipple.  Biopsy of the left breast mass revealed invasive moderately differentiated carcinoma ER/OK positive and HER-2/bishop negative.  Patient also had an ultrasound of the axilla which was concerning for an abnormal left axillary lymph node with cortical thickening. She apparently had an FNA of the left axillary nodule which was positive for malignancy.  On 2017 patient underwent left modified radical mastectomy, right prophylactic mastectomy and immediate breast reconstruction. She also underwent right prophylactic mastectomy.  We have had her on records suggest that patient did have multifocal disease with pT2 pN1 aM0.  The largest focus measured 3.5 cm.  2 of 11 lymph nodes were positive for metastatic disease some with extracapsular extension.  Notes that patient did receive Arimidex neoadjuvant  from 2017 to 2018 prior to her bilateral mastectomies.    Review of her note suggest that she developed cough which she attributed to anastrozole and patient was then placed on tamoxifen.  She had MammaPrint testing which returned with low risk for relapse.    Her postop course was also complicated by  left chest wall abscess which resulted in I&D and removal of the left tissue expander. She was referred for radiation treatment boards ultimately declined.    Patient was then placed on tamoxifen in April 2018 which she stopped after less than a month due to nausea and vomiting.  Patient in the interim also was diagnosed with chronic hepatitis C was seen by the hepatologist.  Tamoxifen was dose reduced to 10 mg daily with the goal to increase to 20 mg daily.      Patient has relocated to [place] campus.  She has transitioned her care to us now.  She is currently not on any antiestrogen therapy.     Her bilateral mastectomy specimen are available for review    1/29/2021 patient had bone density which showed osteoporosis  Patient was seen by neurosurgery and had a bone scan done in March 2021 which showed subtle activity in the inferior L4 vertebral body appears to correlate with new area of sclerosis on CT of the abdomen and pelvis.  There is also subtle activity with possible new lesion at the posterior left sacrum.  These findings were concerning for metastatic disease.  PET CT scan was recommended to further evaluate.  4/8/2021 patient had a PET CT scan which showed evidence of disease in the neck chest abdomen and pelvis.  But she has a 1.1 cm mixed lytic/sclerotic hypermetabolic lesion within the left hemisacrum consistent with metastatic disease.  There is also accumulation within the inferior endplate of the L4 vertebral body thought to correspond to 1.2 centimeters sclerotic lesion consistent with metastatic disease.  There is a tiny hypermetabolic focus within the L3, T11.  Suspicious for also early metastatic disease.  4/26/2021 patient underwent CT-guided biopsy of sacral lesion, pathology was consistent with metastatic breast cancer ER and OR positive HER-2/bishop was negative.  7/6/21 CT new sclerosis within the right 12th rib posteriorly which could represent metastatic lesion or a healed or healing  fracture, new sclerosis within the right 12th rib posteriorly which         could represent metastatic lesion,new sclerosis within the right T8 transverse process worrisome for metastatic        disease progression.  7/7/21 complaints of 8/10 back pain and 8/10 LUQ pain. Referral to radiation for questionable mets on CT chest.  7/26/2021 patient had CT scan of the head with contrast with did not show any evidence of metastatic disease.  CT scan of the chest abdomen and pelvis did not show any evidence of progressive disease.  7/26/2021 patient had CEA level which shows declining values CA 15-3 has decreased to 62 from 84  11/3/2021: Patient had CT scan of the chest, abdomen and pelvis. In the chest there were no suspicious pulmonary nodules. There is no clear evidence of progressive disease  12/13/2021 patient had a bone scan which showed uptake along the posterior right ribs consistent with rib fractures.  There is mild uptake in the L4 vertebral body corresponding to the sclerotic lesion seen on previous CT scan.  This was likely due to metastatic disease  10/6/2022: Patient has been lost to follow-up.  She stopped Ibrance due to pulmonary issues.  Apparently patient went to the emergency room and was told that Ibrance was causing lung damage.  October 6, 2022: She has a increasing CA 15-3 and CA 27.29 as well as CEA level.  10/19/2022: Patient had guardant 360 testing done which was negative  10/31/2022: Patient had bone scan which basically showed evidence for mild progression of multifocal osseous metastatic disease.  There appears to be new activity corresponding to the thoracic cervical spine, left scapula, left sacrum and left proximal femur.  CT scan of the chest otherwise is stable.  There is a nonobstructing stone on the left kidney.        Past Medical History:   Diagnosis Date    Allergic     Bone cancer     METASTATIC BONE    Bradycardia     SECONDARY TO ABLATION    Breast cancer 2017    mets to lymph  nodes; did not do radiation    Cancer of unknown origin     Compression fx, thoracic spine, open, initial encounter     Coronary artery disease     COVID-19 2021    Diabetes mellitus     Heart disease, unspecified     Hepatitis C     RESOLVED WITH MEDICATION    Hyperlipidemia     Hypertension     Obesity (BMI 30-39.9) 2021    Rib fracture     Per patient    Sleep apnea     no machine    Tachycardia     ATRIAL    Type 2 diabetes mellitus 2017       Past Surgical History:   Procedure Laterality Date    BACK SURGERY      neck X 2    BREAST RECONSTRUCTION Bilateral     CARDIAC ABLATION       atrial tachycardia x 5 ablations     CARDIAC CATHETERIZATION      CARDIAC SURGERY      stent placed in aorta    CARDIAC SURGERY      6 surgeries as baby     CERVICAL FUSION ANTERIOR WITH ARTIFICIAL DISCECTOMY IMPLANTATION N/A 2020    Procedure: C4 VERTEBRECTOMY AND ANTERIOR CERIVCAL DISCECTOMY WITH FUSION OF CERVICAL THREE THROUGH FIVE WITH REMOVAL OF HARDWARE C5-C6;  Surgeon: Mark Kaba MD;  Location: [facility] MAIN OR;  Service: Neurosurgery;  Laterality: N/A;     SECTION      x2    COLONOSCOPY N/A 10/23/2020    Procedure: COLONOSCOPY WITH POLYPECTOMY X6;  Surgeon: Stanley Bourne MD;  Location: [facility] ENDOSCOPY;  Service: Gastroenterology;  Laterality: N/A;  POLYPS, INTERNAL HEMORRHOIDS    ENDOMETRIAL ABLATION      REMOVAL SCAR TISSUE UTERINE    ENDOSCOPY N/A 10/23/2020    Procedure: ESOPHAGOGASTRODUODENOSCOPY WITH BIOPSY X 1 AREA;  Surgeon: Stanley Bourne MD;  Location: [facility] ENDOSCOPY;  Service: Gastroenterology;  Laterality: N/A;  GASTRITIS, ESOPHAGITIS, HIATAL HERNIA    KNEE SURGERY      MASTECTOMY Bilateral     NECK SURGERY      PACEMAKER IMPLANTATION      PAIN PUMP INSERTION/REVISION N/A 2022    Procedure: PAIN PUMP INSERTION AND INTRATHECAL CATHETER PLACEMENT;  Surgeon: Chuck Hunter MD;  Location: [facility] MAIN OR;  Service: Pain Management;   "Laterality: N/A;         Current Outpatient Medications:     abemaciclib (VERZENIO) 200 mg tablet chemo tablet, Take 1 tablet by mouth 2 (Two) Times a Day., Disp: , Rfl:     aspirin 81 MG chewable tablet, Chew 1 tablet Daily., Disp: 30 tablet, Rfl: 1    Blood Glucose Monitoring Suppl (Accu-Chek Araceli) device, Use as instructed   To test blood sugar bid, Disp: 1 each, Rfl: 0    Calcium Carbonate-Vit D-Min (Calcium 600+D Plus Minerals) 600-400 MG-UNIT chewable tablet, Chew 600 mg 2 (Two) Times a Day., Disp: 60 each, Rfl: 6    Continuous Blood Gluc  (FreeStyle Rajeev 14 Day [place]) device, 1 each Daily., Disp: 1 each, Rfl: 0    Continuous Blood Gluc Sensor (FreeStyle Rajeev 14 Day Sensor) misc, 1 each Daily., Disp: 6 each, Rfl: 3    exemestane (AROMASIN) 25 MG tablet, Take 1 tablet by mouth Daily., Disp: , Rfl:     famotidine (PEPCID) 20 MG tablet, Take 1 tablet by mouth 2 (Two) Times a Day As Needed for Heartburn., Disp: , Rfl:     fluticasone (FLONASE) 50 MCG/ACT nasal spray, 2 sprays into the nostril(s) as directed by provider Daily As Needed for Rhinitis., Disp: , Rfl:     ibuprofen (ADVIL,MOTRIN) 800 MG tablet, Take 1 tablet by mouth Every 8 (Eight) Hours As Needed for Mild Pain. IN BETWEEN PAIN  MEDS, Disp: 90 tablet, Rfl: 1    insulin NPH-insulin regular (humuLIN 70/30,novoLIN 70/30) (70-30) 100 UNIT/ML injection, Inject 10 Units under the skin into the appropriate area as directed Every 12 (Twelve) Hours., Disp: , Rfl:     Insulin Pen Needle (B-D UF III MINI PEN NEEDLES) 31G X 5 MM misc, Use to inject insulin twice daily   DX: E11.9, Disp: 100 each, Rfl: 0    Insulin Syringe-Needle U-100 28G X 1/2\" 1 ML misc, 1 each 2 (Two) Times a Day., Disp: 100 each, Rfl: 6    losartan (Cozaar) 50 MG tablet, Take 1 tablet by mouth Daily. Discontinue the lisinopril due to interaction with new chemotherapy, Disp: 90 tablet, Rfl: 1    methocarbamol (ROBAXIN) 750 MG tablet, Take 1 tablet by mouth 3 (Three) Times a Day As " Needed for Muscle Spasms., Disp: 15 tablet, Rfl: 0    ondansetron ODT (ZOFRAN-ODT) 4 MG disintegrating tablet, Place 1 tablet on the tongue Every 8 (Eight) Hours As Needed for Nausea or Vomiting., Disp: 20 tablet, Rfl: 2    rosuvastatin (Crestor) 20 MG tablet, Take 1 tablet by mouth Every Night., Disp: 90 tablet, Rfl: 0    Allergies   Allergen Reactions    Promethazine Other (See Comments)     Hyperactive mean    Tape Other (See Comments)     .blisters         Family History   Problem Relation Age of Onset    Heart disease Mother     Stroke Mother     Lung cancer Mother     Aneurysm Father     Diabetes Sister     No Known Problems Brother     No Known Problems Brother     Diabetes type I Half-Sister     Thyroid cancer Half-Sister     Cancer Maternal Aunt     Heart attack Sister     Thyroid disease Sister     No Known Problems Sister        Cancer-related family history includes Cancer in her maternal aunt; Lung cancer in her mother; Thyroid cancer in her half-sister.    Social History     Tobacco Use    Smoking status: Never     Passive exposure: Never    Smokeless tobacco: Never   Vaping Use    Vaping Use: Never used   Substance Use Topics    Alcohol use: Yes     Alcohol/week: 1.0 standard drink of alcohol     Types: 1 Glasses of wine per week     Comment: mixed drink once a week    Drug use: Not Currently         I have reviewed and confirmed the accuracy of the patient's history: Chief complaint, HPI, ROS, and Subjective as entered by the MA/LPN/RN. Мария Nunez MD 11/20/23      SUBJECTIVE:    Patient could not tolerate Afinitor.  She has not started Aromasin    She is on abemaciclib stable.  Has had some diarrhea but she is able to drink fluids.      Radhachris MALLORIE Garrison reports a pain score of 8.  Given her pain assessment as noted, treatment options were discussed and the following options were decided upon as a follow-up plan to address the patient's pain: continuation of current treatment plan for pain  "and referral to specialist for assistance in pain treatment guidance.     REVIEW OF SYSTEMS:     Review of Systems   Constitutional:  Negative for chills and fever.   HENT:  Negative for ear pain, mouth sores, nosebleeds and sore throat.    Eyes:  Negative for photophobia and visual disturbance.   Respiratory:  Negative for wheezing and stridor.    Cardiovascular:  Negative for chest pain and palpitations.   Gastrointestinal:  Negative for abdominal pain, diarrhea, nausea and vomiting.   Endocrine: Negative for cold intolerance and heat intolerance.   Genitourinary:  Negative for dysuria and hematuria.   Musculoskeletal:  Negative for joint swelling and neck stiffness.   Skin:  Negative for color change and rash.   Neurological:  Negative for seizures and syncope.   Hematological:  Negative for adenopathy.        No obvious bleeding   Psychiatric/Behavioral:  Negative for agitation, confusion and hallucinations.          OBJECTIVE:    Vitals:    11/20/23 1504   BP: 129/62   Pulse: 61   Resp: 18   Temp: 98 °F (36.7 °C)   TempSrc: Infrared   SpO2: 96%   Weight: 63 kg (139 lb)   Height: 154.9 cm (61\")   PainSc:   8   PainLoc: Generalized       Body mass index is 26.26 kg/m².    ECOG    (1) Restricted in physically strenuous activity, ambulatory and able to do work of light nature    Physical Exam  Vitals and nursing note reviewed.   Constitutional:       General: She is not in acute distress.     Appearance: Normal appearance. She is not diaphoretic.   HENT:      Head: Normocephalic and atraumatic.      Mouth/Throat:      Mouth: Mucous membranes are moist.      Pharynx: Oropharynx is clear.   Eyes:      General: No scleral icterus.        Right eye: No discharge.         Left eye: No discharge.      Extraocular Movements: Extraocular movements intact.      Conjunctiva/sclera: Conjunctivae normal.   Neck:      Thyroid: No thyromegaly.   Cardiovascular:      Rate and Rhythm: Normal rate and regular rhythm.      Pulses: " Normal pulses.      Heart sounds: Normal heart sounds.      No friction rub. No gallop.   Pulmonary:      Effort: Pulmonary effort is normal. No respiratory distress.      Breath sounds: Normal breath sounds. No stridor. No wheezing.   Abdominal:      General: Bowel sounds are normal. There is distension.      Palpations: Abdomen is soft. There is no mass.      Tenderness: There is abdominal tenderness. There is guarding (Left upper abdomen). There is no rebound.   Musculoskeletal:         General: No tenderness. Normal range of motion.      Cervical back: Normal range of motion and neck supple.      Right lower leg: No edema.      Left lower leg: No edema.      Comments: Neck pain.  Patient has a neck collar.   Lymphadenopathy:      Cervical: No cervical adenopathy.   Skin:     General: Skin is warm and dry.      Capillary Refill: Capillary refill takes less than 2 seconds.      Findings: No bruising, erythema or rash.   Neurological:      Mental Status: She is alert and oriented to person, place, and time.      Cranial Nerves: No cranial nerve deficit.      Sensory: No sensory deficit.      Motor: No abnormal muscle tone.   Psychiatric:         Mood and Affect: Mood normal.         Behavior: Behavior normal.         Thought Content: Thought content normal.         Judgment: Judgment normal.     Right foot noted but no redness or increase in warmth    I have reexamined the patient and the results are consistent with the previously documented exam. Мария Nunez MD      RECENT LABS    WBC   Date Value Ref Range Status   11/20/2023 1.89 (L) 3.40 - 10.80 10*3/mm3 Final     RBC   Date Value Ref Range Status   11/20/2023 3.88 3.77 - 5.28 10*6/mm3 Final     Hemoglobin   Date Value Ref Range Status   11/20/2023 11.2 (L) 12.0 - 15.9 g/dL Final     Hematocrit   Date Value Ref Range Status   11/20/2023 35.5 34.0 - 46.6 % Final     MCV   Date Value Ref Range Status   11/20/2023 91.5 79.0 - 97.0 fL Final     MCH    Date Value Ref Range Status   11/20/2023 28.9 26.6 - 33.0 pg Final     MCHC   Date Value Ref Range Status   11/20/2023 31.5 31.5 - 35.7 g/dL Final     RDW   Date Value Ref Range Status   11/20/2023 13.3 12.3 - 15.4 % Final     RDW-SD   Date Value Ref Range Status   11/20/2023 43.0 37.0 - 54.0 fl Final     MPV   Date Value Ref Range Status   11/20/2023 10.7 6.0 - 12.0 fL Final     Platelets   Date Value Ref Range Status   11/20/2023 41 (C) 140 - 450 10*3/mm3 Final     Neutrophil %   Date Value Ref Range Status   11/20/2023 68.2 42.7 - 76.0 % Final     Lymphocyte %   Date Value Ref Range Status   11/20/2023 24.9 19.6 - 45.3 % Final     Monocyte %   Date Value Ref Range Status   11/20/2023 6.9 5.0 - 12.0 % Final     Eosinophil %   Date Value Ref Range Status   11/20/2023 0.0 (L) 0.3 - 6.2 % Final     Basophil %   Date Value Ref Range Status   11/20/2023 0.0 0.0 - 1.5 % Final     Immature Grans %   Date Value Ref Range Status   07/20/2020 0.7 (H) 0.0 - 0.5 % Final     Neutrophils, Absolute   Date Value Ref Range Status   11/20/2023 1.29 (L) 1.70 - 7.00 10*3/mm3 Final     Lymphocytes, Absolute   Date Value Ref Range Status   11/20/2023 0.47 (L) 0.70 - 3.10 10*3/mm3 Final     Monocytes, Absolute   Date Value Ref Range Status   11/20/2023 0.13 0.10 - 0.90 10*3/mm3 Final     Eosinophils, Absolute   Date Value Ref Range Status   11/20/2023 0.00 0.00 - 0.40 10*3/mm3 Final     Basophils, Absolute   Date Value Ref Range Status   11/20/2023 0.00 0.00 - 0.20 10*3/mm3 Final     Immature Grans, Absolute   Date Value Ref Range Status   07/20/2020 0.05 0.00 - 0.05 10*3/mm3 Final     nRBC   Date Value Ref Range Status   11/06/2023 0.1 0.0 - 0.2 /100 WBC Final       Lab Results   Component Value Date    GLUCOSE 112 (H) 11/05/2023    BUN 20 11/05/2023    CREATININE 1.05 (H) 11/05/2023    EGFRIFNONA 70 12/20/2021    EGFRIFAFRI >60 10/12/2018    BCR 19.0 11/05/2023    K 4.7 11/05/2023    CO2 24.0 11/05/2023    CALCIUM 9.7 11/05/2023     PROTENTOTREF 7.4 12/14/2020    ALBUMIN 4.0 11/05/2023    LABIL2 0.9 12/14/2020    AST 25 11/05/2023    ALT 15 11/05/2023       ASSESSMENT:    Metastatic breast cancer presenting as 1.1 cm mixed lytic/sclerotic hypermetabolic lesion in the left hemisacrum suspicious for metastatic disease 1.2 cm sclerotic lesion on L4, small L3 lesion and T11 lesion.  Tumor is ER positive, AZ positive and HER-2/bishop negative.  Patient was prescribed combination of Ibrance and Arimidex.  Continued Ibrance due to pulmonary toxicity.  She was then placed on single agent Arimidex.  Upon progression on Arimidex was switched to Faslodex.  She developed L1 vertebral body progressive disease on Faslodex for that reason we will discontinue Faslodex and begin combination of Aromasin and Afinitor.  Patient took Afinitor for 3 days and discontinued due to nausea and fatigue.  [id number] does not show any actionable mutation.  She does not have any soft tissue for us to biopsy for additional NGS testing  We will discontinue Afinitor from her regimen and offer patient abemaciclib stable along with Aromasin.  We reviewed that abemaciclib can lead to pancytopenia, diarrhea fatigue interstitial pneumonitis.  She was given information as well to review and we will plan to start her soon as medication has been approved by her insurance  She is currently on Aromasin and abemaciclib stable  Abemaciclib induced diarrhea: Patient will continue to use Imodium.  This seems to be helping  Abemaciclib stable induced fatigue  Thrombocytopenia due to abemaciclib  Bone metastasis:.  Biphosphonate's has been recommended patient has not been able to have due to ongoing dental issues  Right foot swelling: Doppler study was negative  History of medical noncompliance  Pancytopenia secondary to Ibrance: Improved off Ibrance  L1 vertebral body involvement.  Currently undergoing XRT.  Patient is also established with Dr. Ruff.  MRI of the spine has been scheduled for  October 2023  Pain management  ypT2 N1 aM0 status post left modified radical mastectomy with lymph node dissection and right prophylactic mastectomy in 2017 performed at [facility].  ER positive, OH positive and HER-2/bishop negative.  Status post bilateral chest wall reconstruction.  According to patient, she tolerated Arimidex very well except that she also had osteoporosis therefore Arimidex was discontinued at that time  Intolerance of tamoxifen in the past  Osteoporosis: We will transition to Xgeva since she has bone metastases  Status post neoadjuvant Arimidex prior to bilateral mastectomies  Thrombocytopenia: Work-up was - December 2020  Neck pain status post cervical spine surgery: Resolved  Complex cardiac medical history including tetralogy of Fallot, coarctation of aorta, VSD status post repair.  Status post stent placement for coarctation of aorta  Personal history and strong family history of breast cancer in multiple in the relatives on paternal side of the family including 4 paternal aunts in their 30s and 40s and 2 maternal aunts at age of 20s and 50s.  There is concern for possible hereditary breast cancer syndrome.  Patient may be  interested in pursuing cancer genetics for her management.  Assessment has been reviewed and updated          Discussion    Patient has metastatic breast cancer estrogen and progesterone dependent and HER-2/bishop negative.  She is currently on Arimidex.  We will add Ibrance.  We will also discontinue Prolia and begin Xgeva to help reduce skeletal events.           Plans:     Reviewed her recent bone scan, CT scan chest abdomen and pelvis  Discontinue Faslodex due to L spine vertebral body with progression  Continue combination of Aromasin 25 mg p.o. daily and abemaciclib stable.  Continue to increase her p.o. fluids  She will be given information today on abemaciclib stable  Hold abemaciclib this week due to thrombocytopenia  MT[facility] pharmacist follow-up with  her next week  Checked tumor markers for CEA CA 15-3 and CA 27.29 reviewed  She has completed  palliative XRT to her lumbar spine  Follow-up with Dr. Ruff with neurosurgical services  Guardant 360 for NGS testing was negative for any actionable mutations  Follow-up with pain management with Dr. Hunter  Guardian 360 does not show any actionable mutation.  Would consider soft tissue biopsy at time of progression for additional NGS testing.  Reviewed with patient  Referred to pulmonary for her dyspnea: Dr. Julio.  Appointment is pending  Follow-up with pain management for ongoing pain issues  Follow-up with /counselor   Reviewed work-up for thrombocytopenia which was negative  Reviewed her bone density which showed osteoporosis  Schedule Xgeva 120 mg subcu monthly once her dental procedures are completed  All questions answered  Follow-up 4 weeks or earlier as needed  All questions answered            Patient verbalized understanding and is in agreement of the above plan.           I spent 40 total minutes, face-to-face, caring for Gladys today. 90% of this time involved counseling and/or coordination of care as documented within this note.

## 2023-11-20 ENCOUNTER — OFFICE VISIT (OUTPATIENT)
Dept: ONCOLOGY | Facility: CLINIC | Age: 63
End: 2023-11-20
Payer: MEDICARE

## 2023-11-20 ENCOUNTER — LAB (OUTPATIENT)
Dept: LAB | Facility: HOSPITAL | Age: 63
End: 2023-11-20
Payer: MEDICARE

## 2023-11-20 VITALS
SYSTOLIC BLOOD PRESSURE: 129 MMHG | BODY MASS INDEX: 26.24 KG/M2 | OXYGEN SATURATION: 96 % | DIASTOLIC BLOOD PRESSURE: 62 MMHG | TEMPERATURE: 98 F | RESPIRATION RATE: 18 BRPM | HEIGHT: 61 IN | WEIGHT: 139 LBS | HEART RATE: 61 BPM

## 2023-11-20 DIAGNOSIS — C50.919 MALIGNANT NEOPLASM OF FEMALE BREAST, UNSPECIFIED ESTROGEN RECEPTOR STATUS, UNSPECIFIED LATERALITY, UNSPECIFIED SITE OF BREAST: Primary | ICD-10-CM

## 2023-11-20 LAB
ALBUMIN SERPL-MCNC: 3.7 G/DL (ref 3.5–5.2)
ALBUMIN/GLOB SERPL: 1.2 G/DL
ALP SERPL-CCNC: 83 U/L (ref 39–117)
ALT SERPL W P-5'-P-CCNC: 12 U/L (ref 1–33)
ANION GAP SERPL CALCULATED.3IONS-SCNC: 8 MMOL/L (ref 5–15)
AST SERPL-CCNC: 22 U/L (ref 1–32)
BASOPHILS # BLD AUTO: 0 10*3/MM3 (ref 0–0.2)
BASOPHILS NFR BLD AUTO: 0 % (ref 0–1.5)
BILIRUB SERPL-MCNC: 0.3 MG/DL (ref 0–1.2)
BUN SERPL-MCNC: 14 MG/DL (ref 8–23)
BUN/CREAT SERPL: 14.9 (ref 7–25)
CALCIUM SPEC-SCNC: 9.5 MG/DL (ref 8.6–10.5)
CHLORIDE SERPL-SCNC: 111 MMOL/L (ref 98–107)
CO2 SERPL-SCNC: 23 MMOL/L (ref 22–29)
CREAT SERPL-MCNC: 0.94 MG/DL (ref 0.57–1)
DEPRECATED RDW RBC AUTO: 43 FL (ref 37–54)
EGFRCR SERPLBLD CKD-EPI 2021: 69.2 ML/MIN/1.73
EOSINOPHIL # BLD AUTO: 0 10*3/MM3 (ref 0–0.4)
EOSINOPHIL NFR BLD AUTO: 0 % (ref 0.3–6.2)
ERYTHROCYTE [DISTWIDTH] IN BLOOD BY AUTOMATED COUNT: 13.3 % (ref 12.3–15.4)
GLOBULIN UR ELPH-MCNC: 3 GM/DL
GLUCOSE SERPL-MCNC: 95 MG/DL (ref 65–99)
HCT VFR BLD AUTO: 35.5 % (ref 34–46.6)
HGB BLD-MCNC: 11.2 G/DL (ref 12–15.9)
HOLD SPECIMEN: NORMAL
HOLD SPECIMEN: NORMAL
LYMPHOCYTES # BLD AUTO: 0.47 10*3/MM3 (ref 0.7–3.1)
LYMPHOCYTES NFR BLD AUTO: 24.9 % (ref 19.6–45.3)
MCH RBC QN AUTO: 28.9 PG (ref 26.6–33)
MCHC RBC AUTO-ENTMCNC: 31.5 G/DL (ref 31.5–35.7)
MCV RBC AUTO: 91.5 FL (ref 79–97)
MONOCYTES # BLD AUTO: 0.13 10*3/MM3 (ref 0.1–0.9)
MONOCYTES NFR BLD AUTO: 6.9 % (ref 5–12)
NEUTROPHILS NFR BLD AUTO: 1.29 10*3/MM3 (ref 1.7–7)
NEUTROPHILS NFR BLD AUTO: 68.2 % (ref 42.7–76)
PLATELET # BLD AUTO: 41 10*3/MM3 (ref 140–450)
PMV BLD AUTO: 10.7 FL (ref 6–12)
POTASSIUM SERPL-SCNC: 4.3 MMOL/L (ref 3.5–5.2)
PROT SERPL-MCNC: 6.7 G/DL (ref 6–8.5)
RBC # BLD AUTO: 3.88 10*6/MM3 (ref 3.77–5.28)
SODIUM SERPL-SCNC: 142 MMOL/L (ref 136–145)
WBC NRBC COR # BLD AUTO: 1.89 10*3/MM3 (ref 3.4–10.8)
WHOLE BLOOD HOLD COAG: NORMAL

## 2023-11-20 PROCEDURE — 36415 COLL VENOUS BLD VENIPUNCTURE: CPT

## 2023-11-20 PROCEDURE — 85025 COMPLETE CBC W/AUTO DIFF WBC: CPT

## 2023-11-20 PROCEDURE — 80053 COMPREHEN METABOLIC PANEL: CPT | Performed by: INTERNAL MEDICINE

## 2023-11-27 ENCOUNTER — SPECIALTY PHARMACY (OUTPATIENT)
Dept: PHARMACY | Facility: HOSPITAL | Age: 63
End: 2023-11-27
Payer: MEDICARE

## 2023-11-27 NOTE — PROGRESS NOTES
Specialty Pharmacy Note: Verzenio (abemaciclib) - ON HOLD    Patient contacted pharmacist today to discuss resuming Verzenio (abemaciclib).  Medication currently on hold due to low platelets and patient was scheduled to come into the clinic today for labs, however, she was diagnosed with Covid on 11/24/23 and has rescheduled labs until 12/4/23.  Discussed with Dr. Nunez and patient is to continue to hold Verzenio until labs are evaluated on 12/4/23.  Patient notified via telephone to continue to hold medication.  She is currently feeling congestion and fatigue but not having respiratory or other covid symptoms.    11/27/23  Problem: low platelets and Covid infection  Communication: provider: Patient to hold Verzenio until labs are rechecked on 12/4/23. Contacted patient with directions to hold medication. She has been holding since 11/21/23 due to low platelets    Recommendation: Patient currently has grade 3 toxicity and per Uptodate, withhold abemaciclib until toxicity resolves to ? grade 2 (no abemaciclib dosage reduction is necessary)   Follow-up: 1 week-delay due to Covid infection  Eda Her

## 2023-12-01 ENCOUNTER — APPOINTMENT (OUTPATIENT)
Dept: GENERAL RADIOLOGY | Facility: HOSPITAL | Age: 63
End: 2023-12-01
Payer: MEDICARE

## 2023-12-01 ENCOUNTER — HOSPITAL ENCOUNTER (OUTPATIENT)
Facility: HOSPITAL | Age: 63
Setting detail: OBSERVATION
Discharge: HOME | End: 2023-12-02
Attending: EMERGENCY MEDICINE | Admitting: STUDENT IN AN ORGANIZED HEALTH CARE EDUCATION/TRAINING PROGRAM
Payer: MEDICARE

## 2023-12-01 DIAGNOSIS — R55 SYNCOPE, UNSPECIFIED SYNCOPE TYPE: Primary | ICD-10-CM
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
## 2023-12-01 PROBLEM — T82.9XXA PACEMAKER COMPLICATIONS: Status: ACTIVE | Noted: 2023-12-01

## 2023-12-01 LAB
ANION GAP SERPL CALCULATED.3IONS-SCNC: 14 MMOL/L (ref 5–15)
BASOPHILS # BLD AUTO: 0 10*3/MM3 (ref 0–0.2)
BASOPHILS NFR BLD AUTO: 1.1 % (ref 0–1.5)
BUN SERPL-MCNC: 10 MG/DL (ref 8–23)
BUN/CREAT SERPL: 15.9 (ref 7–25)
CALCIUM SPEC-SCNC: 9.7 MG/DL (ref 8.6–10.5)
CHLORIDE SERPL-SCNC: 102 MMOL/L (ref 98–107)
CO2 SERPL-SCNC: 24 MMOL/L (ref 22–29)
CREAT SERPL-MCNC: 0.63 MG/DL (ref 0.57–1)
DEPRECATED RDW RBC AUTO: 45.5 FL (ref 37–54)
EGFRCR SERPLBLD CKD-EPI 2021: 101.1 ML/MIN/1.73
EOSINOPHIL # BLD AUTO: 0 10*3/MM3 (ref 0–0.4)
EOSINOPHIL NFR BLD AUTO: 0.9 % (ref 0.3–6.2)
ERYTHROCYTE [DISTWIDTH] IN BLOOD BY AUTOMATED COUNT: 14.1 % (ref 12.3–15.4)
GLUCOSE BLDC GLUCOMTR-MCNC: 111 MG/DL (ref 70–105)
GLUCOSE SERPL-MCNC: 85 MG/DL (ref 65–99)
HCT VFR BLD AUTO: 36.2 % (ref 34–46.6)
HGB BLD-MCNC: 11.8 G/DL (ref 12–15.9)
LYMPHOCYTES # BLD AUTO: 0.4 10*3/MM3 (ref 0.7–3.1)
LYMPHOCYTES NFR BLD AUTO: 21.2 % (ref 19.6–45.3)
MAGNESIUM SERPL-MCNC: 2.1 MG/DL (ref 1.6–2.4)
MCH RBC QN AUTO: 28.3 PG (ref 26.6–33)
MCHC RBC AUTO-ENTMCNC: 32.5 G/DL (ref 31.5–35.7)
MCV RBC AUTO: 86.9 FL (ref 79–97)
MONOCYTES # BLD AUTO: 0.2 10*3/MM3 (ref 0.1–0.9)
MONOCYTES NFR BLD AUTO: 10.2 % (ref 5–12)
NEUTROPHILS NFR BLD AUTO: 1.3 10*3/MM3 (ref 1.7–7)
NEUTROPHILS NFR BLD AUTO: 66.6 % (ref 42.7–76)
NRBC BLD AUTO-RTO: 0.1 /100 WBC (ref 0–0.2)
PLATELET # BLD AUTO: 95 10*3/MM3 (ref 140–450)
PMV BLD AUTO: 7.3 FL (ref 6–12)
POTASSIUM SERPL-SCNC: 3.3 MMOL/L (ref 3.5–5.2)
RBC # BLD AUTO: 4.16 10*6/MM3 (ref 3.77–5.28)
SODIUM SERPL-SCNC: 140 MMOL/L (ref 136–145)
TROPONIN T SERPL HS-MCNC: 19 NG/L
WBC NRBC COR # BLD AUTO: 2 10*3/MM3 (ref 3.4–10.8)
WHOLE BLOOD HOLD COAG: NORMAL

## 2023-12-01 PROCEDURE — 99284 EMERGENCY DEPT VISIT MOD MDM: CPT

## 2023-12-01 PROCEDURE — 83735 ASSAY OF MAGNESIUM: CPT | Performed by: EMERGENCY MEDICINE

## 2023-12-01 PROCEDURE — 63710000001 INSULIN ISOPHANE & REGULAR PER 5 UNITS: Performed by: STUDENT IN AN ORGANIZED HEALTH CARE EDUCATION/TRAINING PROGRAM

## 2023-12-01 PROCEDURE — 85025 COMPLETE CBC W/AUTO DIFF WBC: CPT | Performed by: EMERGENCY MEDICINE

## 2023-12-01 PROCEDURE — 84484 ASSAY OF TROPONIN QUANT: CPT | Performed by: EMERGENCY MEDICINE

## 2023-12-01 PROCEDURE — 99214 OFFICE O/P EST MOD 30 MIN: CPT | Performed by: INTERNAL MEDICINE

## 2023-12-01 PROCEDURE — 96372 THER/PROPH/DIAG INJ SC/IM: CPT

## 2023-12-01 PROCEDURE — 93005 ELECTROCARDIOGRAM TRACING: CPT | Performed by: EMERGENCY MEDICINE

## 2023-12-01 PROCEDURE — 36415 COLL VENOUS BLD VENIPUNCTURE: CPT

## 2023-12-01 PROCEDURE — 80048 BASIC METABOLIC PNL TOTAL CA: CPT | Performed by: EMERGENCY MEDICINE

## 2023-12-01 PROCEDURE — 82948 REAGENT STRIP/BLOOD GLUCOSE: CPT

## 2023-12-01 PROCEDURE — G0378 HOSPITAL OBSERVATION PER HR: HCPCS

## 2023-12-01 PROCEDURE — 25010000002 HEPARIN (PORCINE) PER 1000 UNITS: Performed by: STUDENT IN AN ORGANIZED HEALTH CARE EDUCATION/TRAINING PROGRAM

## 2023-12-01 PROCEDURE — 71045 X-RAY EXAM CHEST 1 VIEW: CPT

## 2023-12-01 RX ORDER — ROSUVASTATIN CALCIUM 10 MG/1
20 TABLET, COATED ORAL NIGHTLY
Status: DISCONTINUED | OUTPATIENT
Start: 2023-12-01 | End: 2023-12-02 | Stop reason: HOSPADM

## 2023-12-01 RX ORDER — SODIUM CHLORIDE 0.9 % (FLUSH) 0.9 %
10 SYRINGE (ML) INJECTION EVERY 12 HOURS SCHEDULED
Status: DISCONTINUED | OUTPATIENT
Start: 2023-12-01 | End: 2023-12-02 | Stop reason: HOSPADM

## 2023-12-01 RX ORDER — ACETAMINOPHEN 325 MG/1
650 TABLET ORAL EVERY 4 HOURS PRN
Status: DISCONTINUED | OUTPATIENT
Start: 2023-12-01 | End: 2023-12-02 | Stop reason: HOSPADM

## 2023-12-01 RX ORDER — LOSARTAN POTASSIUM 50 MG/1
50 TABLET ORAL DAILY
Status: DISCONTINUED | OUTPATIENT
Start: 2023-12-02 | End: 2023-12-01

## 2023-12-01 RX ORDER — ASPIRIN 81 MG/1
81 TABLET, CHEWABLE ORAL DAILY
Status: DISCONTINUED | OUTPATIENT
Start: 2023-12-02 | End: 2023-12-02 | Stop reason: HOSPADM

## 2023-12-01 RX ORDER — INSULIN LISPRO 100 [IU]/ML
2-9 INJECTION, SOLUTION INTRAVENOUS; SUBCUTANEOUS
Status: DISCONTINUED | OUTPATIENT
Start: 2023-12-01 | End: 2023-12-02 | Stop reason: HOSPADM

## 2023-12-01 RX ORDER — FAMOTIDINE 20 MG/1
20 TABLET, FILM COATED ORAL 2 TIMES DAILY PRN
Status: DISCONTINUED | OUTPATIENT
Start: 2023-12-01 | End: 2023-12-02 | Stop reason: HOSPADM

## 2023-12-01 RX ORDER — OXYCODONE HYDROCHLORIDE 5 MG/1
10 TABLET ORAL EVERY 4 HOURS PRN
Status: DISCONTINUED | OUTPATIENT
Start: 2023-12-01 | End: 2023-12-02 | Stop reason: HOSPADM

## 2023-12-01 RX ORDER — CALCIUM CARBONATE 500 MG/1
2 TABLET, CHEWABLE ORAL 2 TIMES DAILY PRN
Status: DISCONTINUED | OUTPATIENT
Start: 2023-12-01 | End: 2023-12-02 | Stop reason: HOSPADM

## 2023-12-01 RX ORDER — AMOXICILLIN 250 MG
2 CAPSULE ORAL 2 TIMES DAILY
Status: DISCONTINUED | OUTPATIENT
Start: 2023-12-01 | End: 2023-12-02 | Stop reason: HOSPADM

## 2023-12-01 RX ORDER — NICOTINE POLACRILEX 4 MG
15 LOZENGE BUCCAL
Status: DISCONTINUED | OUTPATIENT
Start: 2023-12-01 | End: 2023-12-02 | Stop reason: HOSPADM

## 2023-12-01 RX ORDER — METHOCARBAMOL 750 MG/1
750 TABLET, FILM COATED ORAL 3 TIMES DAILY PRN
Status: DISCONTINUED | OUTPATIENT
Start: 2023-12-01 | End: 2023-12-02 | Stop reason: HOSPADM

## 2023-12-01 RX ORDER — SODIUM CHLORIDE 9 MG/ML
40 INJECTION, SOLUTION INTRAVENOUS AS NEEDED
Status: DISCONTINUED | OUTPATIENT
Start: 2023-12-01 | End: 2023-12-02 | Stop reason: HOSPADM

## 2023-12-01 RX ORDER — ONDANSETRON 2 MG/ML
4 INJECTION INTRAMUSCULAR; INTRAVENOUS EVERY 6 HOURS PRN
Status: DISCONTINUED | OUTPATIENT
Start: 2023-12-01 | End: 2023-12-02 | Stop reason: HOSPADM

## 2023-12-01 RX ORDER — IBUPROFEN 600 MG/1
1 TABLET ORAL
Status: DISCONTINUED | OUTPATIENT
Start: 2023-12-01 | End: 2023-12-02 | Stop reason: HOSPADM

## 2023-12-01 RX ORDER — DEXTROSE MONOHYDRATE 25 G/50ML
25 INJECTION, SOLUTION INTRAVENOUS
Status: DISCONTINUED | OUTPATIENT
Start: 2023-12-01 | End: 2023-12-02 | Stop reason: HOSPADM

## 2023-12-01 RX ORDER — BISACODYL 5 MG/1
5 TABLET, DELAYED RELEASE ORAL DAILY PRN
Status: DISCONTINUED | OUTPATIENT
Start: 2023-12-01 | End: 2023-12-02 | Stop reason: HOSPADM

## 2023-12-01 RX ORDER — HEPARIN SODIUM 5000 [USP'U]/ML
5000 INJECTION, SOLUTION INTRAVENOUS; SUBCUTANEOUS EVERY 8 HOURS SCHEDULED
Status: DISCONTINUED | OUTPATIENT
Start: 2023-12-01 | End: 2023-12-02 | Stop reason: HOSPADM

## 2023-12-01 RX ORDER — ONDANSETRON 4 MG/1
4 TABLET, FILM COATED ORAL EVERY 6 HOURS PRN
Status: DISCONTINUED | OUTPATIENT
Start: 2023-12-01 | End: 2023-12-02 | Stop reason: HOSPADM

## 2023-12-01 RX ORDER — LOSARTAN POTASSIUM 50 MG/1
50 TABLET ORAL DAILY
Status: DISCONTINUED | OUTPATIENT
Start: 2023-12-01 | End: 2023-12-02 | Stop reason: HOSPADM

## 2023-12-01 RX ORDER — SODIUM CHLORIDE 0.9 % (FLUSH) 0.9 %
10 SYRINGE (ML) INJECTION AS NEEDED
Status: DISCONTINUED | OUTPATIENT
Start: 2023-12-01 | End: 2023-12-02 | Stop reason: HOSPADM

## 2023-12-01 RX ORDER — POTASSIUM CHLORIDE 20 MEQ/1
40 TABLET, EXTENDED RELEASE ORAL EVERY 4 HOURS
Status: COMPLETED | OUTPATIENT
Start: 2023-12-01 | End: 2023-12-01

## 2023-12-01 RX ORDER — POLYETHYLENE GLYCOL 3350 17 G/17G
17 POWDER, FOR SOLUTION ORAL DAILY PRN
Status: DISCONTINUED | OUTPATIENT
Start: 2023-12-01 | End: 2023-12-02 | Stop reason: HOSPADM

## 2023-12-01 RX ORDER — BISACODYL 10 MG
10 SUPPOSITORY, RECTAL RECTAL DAILY PRN
Status: DISCONTINUED | OUTPATIENT
Start: 2023-12-01 | End: 2023-12-02 | Stop reason: HOSPADM

## 2023-12-01 RX ADMIN — HEPARIN SODIUM 5000 UNITS: 5000 INJECTION INTRAVENOUS; SUBCUTANEOUS at 22:42

## 2023-12-01 RX ADMIN — DOCUSATE SODIUM 50 MG AND SENNOSIDES 8.6 MG 2 TABLET: 8.6; 5 TABLET, FILM COATED ORAL at 20:14

## 2023-12-01 RX ADMIN — ROSUVASTATIN 20 MG: 10 TABLET, FILM COATED ORAL at 20:14

## 2023-12-01 RX ADMIN — LOSARTAN POTASSIUM 50 MG: 50 TABLET, FILM COATED ORAL at 20:15

## 2023-12-01 RX ADMIN — POTASSIUM CHLORIDE 40 MEQ: 1500 TABLET, EXTENDED RELEASE ORAL at 17:31

## 2023-12-01 RX ADMIN — Medication 10 ML: at 22:44

## 2023-12-01 RX ADMIN — POTASSIUM CHLORIDE 40 MEQ: 1500 TABLET, EXTENDED RELEASE ORAL at 20:14

## 2023-12-01 RX ADMIN — INSULIN HUMAN 10 UNITS: 100 INJECTION, SUSPENSION SUBCUTANEOUS at 22:43

## 2023-12-01 NOTE — Clinical Note
Level of Care: Telemetry [5]   Admitting Physician: LINETTE VAZQUEZ [[id number]]   Attending Physician: LINETTE VAZQUEZ [[id number]]

## 2023-12-01 NOTE — ED PROVIDER NOTES
Subjective   History of Present Illness  Chief complaint: Syncope    63-year-old female presents after a syncopal episode.  Patient has a pacemaker.  She states she has had some mild chest discomfort throughout the day today.  She denies any shortness of breath, diaphoresis, dizziness, palpitations.  She states she was at work and had a sudden syncopal episode.  She denies feeling lightheaded prior to the event.  She was apparently caught by a coworker and did not injure herself.  Patient states she feels well now.  Patient did have COVID recently.    History provided by:  Patient      Review of Systems   Constitutional:  Negative for fever.   HENT:  Negative for congestion.    Respiratory:  Negative for cough and shortness of breath.    Cardiovascular:  Positive for chest pain.   Gastrointestinal:  Negative for abdominal pain, diarrhea and vomiting.   Musculoskeletal:  Negative for back pain.   Neurological:  Positive for syncope. Negative for dizziness, light-headedness and headaches.   Psychiatric/Behavioral:  Negative for confusion.        Past Medical History:   Diagnosis Date    Allergic     Bone cancer     METASTATIC BONE    Bradycardia     SECONDARY TO ABLATION    Breast cancer 2017    mets to lymph nodes; did not do radiation    Cancer of unknown origin     Compression fx, thoracic spine, open, initial encounter     Coronary artery disease     COVID-19 09/01/2021    Diabetes mellitus     Heart disease, unspecified     Hepatitis C     RESOLVED WITH MEDICATION    Hyperlipidemia     Hypertension     Obesity (BMI 30-39.9) 02/05/2021    Rib fracture     Per patient    Sleep apnea     no machine    Tachycardia     ATRIAL    Type 2 diabetes mellitus 11/2017       Allergies   Allergen Reactions    Promethazine Other (See Comments)     Hyperactive mean    Tape Other (See Comments)     .blisters         Past Surgical History:   Procedure Laterality Date    BACK SURGERY      neck X 2    BREAST RECONSTRUCTION Bilateral      CARDIAC ABLATION       atrial tachycardia x 5 ablations     CARDIAC CATHETERIZATION      CARDIAC SURGERY      stent placed in aorta    CARDIAC SURGERY      6 surgeries as baby     CERVICAL FUSION ANTERIOR WITH ARTIFICIAL DISCECTOMY IMPLANTATION N/A 2020    Procedure: C4 VERTEBRECTOMY AND ANTERIOR CERIVCAL DISCECTOMY WITH FUSION OF CERVICAL THREE THROUGH FIVE WITH REMOVAL OF HARDWARE C5-C6;  Surgeon: Mark Kaba MD;  Location: [facility] MAIN OR;  Service: Neurosurgery;  Laterality: N/A;     SECTION      x2    COLONOSCOPY N/A 10/23/2020    Procedure: COLONOSCOPY WITH POLYPECTOMY X6;  Surgeon: Stanley Bourne MD;  Location: [facility] ENDOSCOPY;  Service: Gastroenterology;  Laterality: N/A;  POLYPS, INTERNAL HEMORRHOIDS    ENDOMETRIAL ABLATION      REMOVAL SCAR TISSUE UTERINE    ENDOSCOPY N/A 10/23/2020    Procedure: ESOPHAGOGASTRODUODENOSCOPY WITH BIOPSY X 1 AREA;  Surgeon: Stanley Bourne MD;  Location: [facility] ENDOSCOPY;  Service: Gastroenterology;  Laterality: N/A;  GASTRITIS, ESOPHAGITIS, HIATAL HERNIA    KNEE SURGERY      MASTECTOMY Bilateral     NECK SURGERY      PACEMAKER IMPLANTATION      PAIN PUMP INSERTION/REVISION N/A 2022    Procedure: PAIN PUMP INSERTION AND INTRATHECAL CATHETER PLACEMENT;  Surgeon: Chuck Hunter MD;  Location: [facility] MAIN OR;  Service: Pain Management;  Laterality: N/A;       Family History   Problem Relation Age of Onset    Heart disease Mother     Stroke Mother     Lung cancer Mother     Aneurysm Father     Diabetes Sister     No Known Problems Brother     No Known Problems Brother     Diabetes type I Half-Sister     Thyroid cancer Half-Sister     Cancer Maternal Aunt     Heart attack Sister     Thyroid disease Sister     No Known Problems Sister        Social History     Socioeconomic History    Marital status: Legally     Number of children: 2   Tobacco Use    Smoking status: Never     Passive exposure: Never    Smokeless  "tobacco: Never   Vaping Use    Vaping Use: Never used   Substance and Sexual Activity    Alcohol use: Yes     Alcohol/week: 1.0 standard drink of alcohol     Types: 1 Glasses of wine per week     Comment: mixed drink once a week    Drug use: Not Currently    Sexual activity: Defer       /95 (BP Location: Left arm, Patient Position: Lying)   Pulse 60   Temp 98.6 °F (37 °C) (Oral)   Resp 16   Ht 154.9 cm (61\")   Wt 63 kg (139 lb)   LMP  (LMP Unknown)   SpO2 96%   BMI 26.26 kg/m²       Objective   Physical Exam  Vitals and nursing note reviewed.   Constitutional:       Appearance: Normal appearance.   HENT:      Head: Normocephalic and atraumatic.      Mouth/Throat:      Mouth: Mucous membranes are moist.   Cardiovascular:      Rate and Rhythm: Normal rate and regular rhythm.      Heart sounds: Normal heart sounds.   Pulmonary:      Effort: Pulmonary effort is normal. No respiratory distress.      Breath sounds: Normal breath sounds.   Abdominal:      Palpations: Abdomen is soft.      Tenderness: There is no abdominal tenderness.   Musculoskeletal:      Right lower leg: No edema.      Left lower leg: No edema.   Skin:     General: Skin is warm and dry.   Neurological:      Mental Status: She is alert and oriented to person, place, and time.         Procedures           ED Course      My interpretation of EKG shows AV dual paced rhythm, rate of 60                           Results for orders placed or performed during the hospital encounter of 12/01/23   Basic Metabolic Panel    Specimen: Blood   Result Value Ref Range    Glucose 85 65 - 99 mg/dL    BUN 10 8 - 23 mg/dL    Creatinine 0.63 0.57 - 1.00 mg/dL    Sodium 140 136 - 145 mmol/L    Potassium 3.3 (L) 3.5 - 5.2 mmol/L    Chloride 102 98 - 107 mmol/L    CO2 24.0 22.0 - 29.0 mmol/L    Calcium 9.7 8.6 - 10.5 mg/dL    BUN/Creatinine Ratio 15.9 7.0 - 25.0    Anion Gap 14.0 5.0 - 15.0 mmol/L    eGFR 101.1 >60.0 mL/min/1.73   Single High Sensitivity Troponin " T    Specimen: Blood   Result Value Ref Range    HS Troponin T 19 (H) <14 ng/L   Magnesium    Specimen: Blood   Result Value Ref Range    Magnesium 2.1 1.6 - 2.4 mg/dL   CBC Auto Differential    Specimen: Blood   Result Value Ref Range    WBC 2.00 (L) 3.40 - 10.80 10*3/mm3    RBC 4.16 3.77 - 5.28 10*6/mm3    Hemoglobin 11.8 (L) 12.0 - 15.9 g/dL    Hematocrit 36.2 34.0 - 46.6 %    MCV 86.9 79.0 - 97.0 fL    MCH 28.3 26.6 - 33.0 pg    MCHC 32.5 31.5 - 35.7 g/dL    RDW 14.1 12.3 - 15.4 %    RDW-SD 45.5 37.0 - 54.0 fl    MPV 7.3 6.0 - 12.0 fL    Platelets 95 (L) 140 - 450 10*3/mm3    Neutrophil % 66.6 42.7 - 76.0 %    Lymphocyte % 21.2 19.6 - 45.3 %    Monocyte % 10.2 5.0 - 12.0 %    Eosinophil % 0.9 0.3 - 6.2 %    Basophil % 1.1 0.0 - 1.5 %    Neutrophils, Absolute 1.30 (L) 1.70 - 7.00 10*3/mm3    Lymphocytes, Absolute 0.40 (L) 0.70 - 3.10 10*3/mm3    Monocytes, Absolute 0.20 0.10 - 0.90 10*3/mm3    Eosinophils, Absolute 0.00 0.00 - 0.40 10*3/mm3    Basophils, Absolute 0.00 0.00 - 0.20 10*3/mm3    nRBC 0.1 0.0 - 0.2 /100 WBC   ECG 12 Lead Syncope   Result Value Ref Range    QT Interval 490 ms    QTC Interval 492 ms   Light Blue Top   Result Value Ref Range    Extra Tube Hold for add-ons.      XR Chest 1 View    Result Date: 12/1/2023  Impression: Stable mild cardiac enlargement. Central pulmonary vasculature enlargement is present, which may represent mild congestive change. There are no overt features of edema. Electronically Signed: Kym Antony MD  12/1/2023 12:38 PM EST  Workstation ID: [id number]               Medical Decision Making  Amount and/or Complexity of Data Reviewed  Labs: ordered.  Radiology: ordered.  ECG/medicine tests: ordered.    Risk  Prescription drug management.      Patient had the above evaluation.  Results were discussed with the patient.  My interpretation of chest x-ray showed no infiltrate or effusion.  White blood cell count is 2.0 however this is improved from recent labs.  Hemoglobin and  platelets are also improved.  BMP is unremarkable.  Troponin is borderline elevated at 19.  EKG shows no acute ischemia.  I discussed with the hospitalist and the patient will be admitted for further evaluation and management.      Final diagnoses:   Syncope, unspecified syncope type       ED Disposition  ED Disposition       ED Disposition   Decision to Admit    Condition   --    Comment   Level of Care: Telemetry [5]   Admitting Physician: LINETTE VAZQUEZ [[id number]]   Attending Physician: LINETTE VAZQUEZ [[id number]]                 No follow-up provider specified.       Medication List      No changes were made to your prescriptions during this visit.            Ki Palacios MD  12/01/23 4912

## 2023-12-02 ENCOUNTER — READMISSION MANAGEMENT (OUTPATIENT)
Dept: CALL CENTER | Facility: HOSPITAL | Age: 63
End: 2023-12-02
Payer: MEDICARE

## 2023-12-02 VITALS
RESPIRATION RATE: 20 BRPM | WEIGHT: 139.11 LBS | DIASTOLIC BLOOD PRESSURE: 82 MMHG | HEART RATE: 73 BPM | BODY MASS INDEX: 26.26 KG/M2 | TEMPERATURE: 98.2 F | HEIGHT: 61 IN | OXYGEN SATURATION: 93 % | SYSTOLIC BLOOD PRESSURE: 168 MMHG

## 2023-12-02 PROBLEM — R55 SYNCOPE: Status: ACTIVE | Noted: 2023-12-02

## 2023-12-02 PROBLEM — R55 SYNCOPE, UNSPECIFIED SYNCOPE TYPE: Status: RESOLVED | Noted: 2023-03-07 | Resolved: 2023-12-02

## 2023-12-02 PROBLEM — R55 SYNCOPE: Status: RESOLVED | Noted: 2023-12-02 | Resolved: 2023-12-02

## 2023-12-02 LAB
ALBUMIN SERPL-MCNC: 3.4 G/DL (ref 3.5–5.2)
ALBUMIN/GLOB SERPL: 1.1 G/DL
ALP SERPL-CCNC: 67 U/L (ref 39–117)
ALT SERPL W P-5'-P-CCNC: 13 U/L (ref 1–33)
ANION GAP SERPL CALCULATED.3IONS-SCNC: 9 MMOL/L (ref 5–15)
AST SERPL-CCNC: 29 U/L (ref 1–32)
BILIRUB SERPL-MCNC: 0.3 MG/DL (ref 0–1.2)
BUN SERPL-MCNC: 13 MG/DL (ref 8–23)
BUN/CREAT SERPL: 16.9 (ref 7–25)
CALCIUM SPEC-SCNC: 9.1 MG/DL (ref 8.6–10.5)
CHLORIDE SERPL-SCNC: 106 MMOL/L (ref 98–107)
CO2 SERPL-SCNC: 27 MMOL/L (ref 22–29)
CREAT SERPL-MCNC: 0.77 MG/DL (ref 0.57–1)
DEPRECATED RDW RBC AUTO: 46.8 FL (ref 37–54)
EGFRCR SERPLBLD CKD-EPI 2021: 87.9 ML/MIN/1.73
ERYTHROCYTE [DISTWIDTH] IN BLOOD BY AUTOMATED COUNT: 14.2 % (ref 12.3–15.4)
GLOBULIN UR ELPH-MCNC: 3 GM/DL
GLUCOSE BLDC GLUCOMTR-MCNC: 103 MG/DL (ref 70–105)
GLUCOSE BLDC GLUCOMTR-MCNC: 81 MG/DL (ref 70–105)
GLUCOSE BLDC GLUCOMTR-MCNC: 87 MG/DL (ref 70–105)
GLUCOSE SERPL-MCNC: 85 MG/DL (ref 65–99)
HCT VFR BLD AUTO: 34 % (ref 34–46.6)
HGB BLD-MCNC: 10.9 G/DL (ref 12–15.9)
LYMPHOCYTES # BLD MANUAL: 0.54 10*3/MM3 (ref 0.7–3.1)
LYMPHOCYTES NFR BLD MANUAL: 8 % (ref 5–12)
MCH RBC QN AUTO: 28.1 PG (ref 26.6–33)
MCHC RBC AUTO-ENTMCNC: 32 G/DL (ref 31.5–35.7)
MCV RBC AUTO: 87.9 FL (ref 79–97)
MONOCYTES # BLD: 0.14 10*3/MM3 (ref 0.1–0.9)
MYELOCYTES NFR BLD MANUAL: 3 % (ref 0–0)
NEUTROPHILS # BLD AUTO: 1.06 10*3/MM3 (ref 1.7–7)
NEUTROPHILS NFR BLD MANUAL: 59 % (ref 42.7–76)
PLATELET # BLD AUTO: 84 10*3/MM3 (ref 140–450)
PMV BLD AUTO: 8.7 FL (ref 6–12)
POTASSIUM SERPL-SCNC: 3.6 MMOL/L (ref 3.5–5.2)
PROT SERPL-MCNC: 6.4 G/DL (ref 6–8.5)
QT INTERVAL: 490 MS
QTC INTERVAL: 492 MS
RBC # BLD AUTO: 3.87 10*6/MM3 (ref 3.77–5.28)
RBC MORPH BLD: NORMAL
SCAN SLIDE: NORMAL
SMALL PLATELETS BLD QL SMEAR: ABNORMAL
SODIUM SERPL-SCNC: 142 MMOL/L (ref 136–145)
VARIANT LYMPHS NFR BLD MANUAL: 30 % (ref 19.6–45.3)
WBC MORPH BLD: NORMAL
WBC NRBC COR # BLD AUTO: 1.8 10*3/MM3 (ref 3.4–10.8)

## 2023-12-02 PROCEDURE — G0378 HOSPITAL OBSERVATION PER HR: HCPCS

## 2023-12-02 PROCEDURE — 80053 COMPREHEN METABOLIC PANEL: CPT | Performed by: STUDENT IN AN ORGANIZED HEALTH CARE EDUCATION/TRAINING PROGRAM

## 2023-12-02 PROCEDURE — 99214 OFFICE O/P EST MOD 30 MIN: CPT | Performed by: INTERNAL MEDICINE

## 2023-12-02 PROCEDURE — 96374 THER/PROPH/DIAG INJ IV PUSH: CPT

## 2023-12-02 PROCEDURE — 25010000002 KETOROLAC TROMETHAMINE PER 15 MG: Performed by: NURSE PRACTITIONER

## 2023-12-02 PROCEDURE — 85007 BL SMEAR W/DIFF WBC COUNT: CPT | Performed by: STUDENT IN AN ORGANIZED HEALTH CARE EDUCATION/TRAINING PROGRAM

## 2023-12-02 PROCEDURE — 25010000002 HEPARIN (PORCINE) PER 1000 UNITS: Performed by: STUDENT IN AN ORGANIZED HEALTH CARE EDUCATION/TRAINING PROGRAM

## 2023-12-02 PROCEDURE — 96372 THER/PROPH/DIAG INJ SC/IM: CPT

## 2023-12-02 PROCEDURE — 82948 REAGENT STRIP/BLOOD GLUCOSE: CPT

## 2023-12-02 PROCEDURE — 85025 COMPLETE CBC W/AUTO DIFF WBC: CPT | Performed by: STUDENT IN AN ORGANIZED HEALTH CARE EDUCATION/TRAINING PROGRAM

## 2023-12-02 PROCEDURE — 36415 COLL VENOUS BLD VENIPUNCTURE: CPT | Performed by: STUDENT IN AN ORGANIZED HEALTH CARE EDUCATION/TRAINING PROGRAM

## 2023-12-02 PROCEDURE — 63710000001 INSULIN ISOPHANE & REGULAR PER 5 UNITS: Performed by: STUDENT IN AN ORGANIZED HEALTH CARE EDUCATION/TRAINING PROGRAM

## 2023-12-02 RX ORDER — KETOROLAC TROMETHAMINE 30 MG/ML
30 INJECTION, SOLUTION INTRAMUSCULAR; INTRAVENOUS ONCE AS NEEDED
Status: DISCONTINUED | OUTPATIENT
Start: 2023-12-02 | End: 2023-12-02 | Stop reason: HOSPADM

## 2023-12-02 RX ORDER — HYDRALAZINE HYDROCHLORIDE 20 MG/ML
10 INJECTION INTRAMUSCULAR; INTRAVENOUS EVERY 6 HOURS PRN
Status: DISCONTINUED | OUTPATIENT
Start: 2023-12-02 | End: 2023-12-02 | Stop reason: HOSPADM

## 2023-12-02 RX ORDER — AMLODIPINE BESYLATE 5 MG/1
5 TABLET ORAL ONCE
Status: COMPLETED | OUTPATIENT
Start: 2023-12-02 | End: 2023-12-02

## 2023-12-02 RX ORDER — POTASSIUM CHLORIDE 20 MEQ/1
40 TABLET, EXTENDED RELEASE ORAL EVERY 4 HOURS
Qty: 4 TABLET | Refills: 0 | Status: COMPLETED | OUTPATIENT
Start: 2023-12-02 | End: 2023-12-02

## 2023-12-02 RX ADMIN — INSULIN HUMAN 10 UNITS: 100 INJECTION, SUSPENSION SUBCUTANEOUS at 08:19

## 2023-12-02 RX ADMIN — HEPARIN SODIUM 5000 UNITS: 5000 INJECTION INTRAVENOUS; SUBCUTANEOUS at 15:30

## 2023-12-02 RX ADMIN — ASPIRIN 81 MG CHEWABLE TABLET 81 MG: 81 TABLET CHEWABLE at 08:19

## 2023-12-02 RX ADMIN — LOSARTAN POTASSIUM 50 MG: 50 TABLET, FILM COATED ORAL at 08:19

## 2023-12-02 RX ADMIN — KETOROLAC TROMETHAMINE 30 MG: 30 INJECTION, SOLUTION INTRAMUSCULAR; INTRAVENOUS at 04:01

## 2023-12-02 RX ADMIN — AMLODIPINE BESYLATE 5 MG: 5 TABLET ORAL at 11:12

## 2023-12-02 RX ADMIN — POTASSIUM CHLORIDE 40 MEQ: 1500 TABLET, EXTENDED RELEASE ORAL at 05:56

## 2023-12-02 RX ADMIN — HEPARIN SODIUM 5000 UNITS: 5000 INJECTION INTRAVENOUS; SUBCUTANEOUS at 07:08

## 2023-12-02 RX ADMIN — POTASSIUM CHLORIDE 40 MEQ: 1500 TABLET, EXTENDED RELEASE ORAL at 08:19

## 2023-12-02 RX ADMIN — Medication 10 ML: at 08:20

## 2023-12-02 NOTE — PLAN OF CARE
Goal Outcome Evaluation:  Pt cooperative with care plan. VSS on RA. BP occasionally hypertensive. Orders to follow up with Dr. Hammond on Monday regarding pacemaker.      Problem: Adult Inpatient Plan of Care  Goal: Plan of Care Review  Outcome: Ongoing, Progressing

## 2023-12-02 NOTE — H&P
[facility] Medicine Services  History & Physical    Patient Name: Gladys Garrison  : 1960  MRN: [id number]  Primary Care Physician:  Chirag Robles DO  Date of admission: 2023  Date and Time of Service: 2023 at 1507    Subjective      Chief Complaint: Syncope    History of Present Illness: Gladys Garrison is a 63 y.o. female with a CMH of metastatic breast cancer to bone, CAD, Third-degree heartblock s/p pacemaker insertion, HTN, HLD, AYDEE, CAD who presented to [facility] on 2023 with syncope.    Earlier today while transporting some items at work patient collapsed.  States she blacked out and when she came to she was awoken to someone slapping her hand.  No signs of seizure such as tongue biting, loss of continence of post-ictal state.  Episode was witnessed and patient essentially collapsed hitting and landed on her left arm and shoulder.  This has happened before in the past, and it was determined at the time that the pacemaker she had settings making it too sensitive making it miss a couple of beats.  ED admitted this person to have their pacemaker interrogated and to have it adjusted.      Review of Systems   Constitutional:  Negative for chills and fever.   HENT:  Negative for congestion and rhinorrhea.    Eyes:  Negative for visual disturbance.   Respiratory:  Negative for shortness of breath.    Cardiovascular:  Negative for chest pain.   Gastrointestinal:  Negative for abdominal pain, diarrhea, nausea and vomiting.   Endocrine: Negative for polyuria.   Genitourinary:  Negative for difficulty urinating and dyspareunia.   Musculoskeletal:  Positive for arthralgias and neck pain. Negative for back pain and myalgias.   Skin:  Negative for rash and wound.   Neurological:  Positive for syncope. Negative for weakness, numbness and headaches.   Psychiatric/Behavioral:  Negative for agitation, behavioral problems and confusion.        Personal History     Past  Medical History:   Diagnosis Date    Allergic     Bone cancer     METASTATIC BONE    Bradycardia     SECONDARY TO ABLATION    Breast cancer     mets to lymph nodes; did not do radiation    Cancer of unknown origin     Compression fx, thoracic spine, open, initial encounter     Coronary artery disease     COVID-19 2021    Diabetes mellitus     Heart disease, unspecified     Hepatitis C     RESOLVED WITH MEDICATION    Hyperlipidemia     Hypertension     Obesity (BMI 30-39.9) 2021    Rib fracture     Per patient    Sleep apnea     no machine    Tachycardia     ATRIAL    Type 2 diabetes mellitus 2017       Past Surgical History:   Procedure Laterality Date    BACK SURGERY      neck X 2    BREAST RECONSTRUCTION Bilateral     CARDIAC ABLATION       atrial tachycardia x 5 ablations     CARDIAC CATHETERIZATION      CARDIAC SURGERY      stent placed in aorta    CARDIAC SURGERY      6 surgeries as baby     CERVICAL FUSION ANTERIOR WITH ARTIFICIAL DISCECTOMY IMPLANTATION N/A 2020    Procedure: C4 VERTEBRECTOMY AND ANTERIOR CERIVCAL DISCECTOMY WITH FUSION OF CERVICAL THREE THROUGH FIVE WITH REMOVAL OF HARDWARE C5-C6;  Surgeon: Mark Kaba MD;  Location: [facility] MAIN OR;  Service: Neurosurgery;  Laterality: N/A;     SECTION      x2    COLONOSCOPY N/A 10/23/2020    Procedure: COLONOSCOPY WITH POLYPECTOMY X6;  Surgeon: Stanley Bourne MD;  Location: [facility] ENDOSCOPY;  Service: Gastroenterology;  Laterality: N/A;  POLYPS, INTERNAL HEMORRHOIDS    ENDOMETRIAL ABLATION      REMOVAL SCAR TISSUE UTERINE    ENDOSCOPY N/A 10/23/2020    Procedure: ESOPHAGOGASTRODUODENOSCOPY WITH BIOPSY X 1 AREA;  Surgeon: Stanley Bourne MD;  Location: [facility] ENDOSCOPY;  Service: Gastroenterology;  Laterality: N/A;  GASTRITIS, ESOPHAGITIS, HIATAL HERNIA    KNEE SURGERY      MASTECTOMY Bilateral     NECK SURGERY      PACEMAKER IMPLANTATION      PAIN PUMP INSERTION/REVISION N/A 2022     Procedure: PAIN PUMP INSERTION AND INTRATHECAL CATHETER PLACEMENT;  Surgeon: Chuck Hunter MD;  Location: [facility] MAIN OR;  Service: Pain Management;  Laterality: N/A;       Family History: family history includes Aneurysm in her father; Cancer in her maternal aunt; Diabetes in her sister; Diabetes type I in her half-sister; Heart attack in her sister; Heart disease in her mother; Lung cancer in her mother; No Known Problems in her brother, brother, and sister; Stroke in her mother; Thyroid cancer in her half-sister; Thyroid disease in her sister. Otherwise pertinent FHx was reviewed and not pertinent to current issue.    Social History:  reports that she has never smoked. She has never been exposed to tobacco smoke. She has never used smokeless tobacco. She reports current alcohol use of about 1.0 standard drink of alcohol per week. She reports that she does not currently use drugs.    Home Medications:  Prior to Admission Medications       Prescriptions Last Dose Informant Patient Reported? Taking?    Blood Glucose Monitoring Suppl (Accu-Chek Araceli) device 12/1/2023  No Yes    Use as instructed   To test blood sugar bid    Calcium Carbonate-Vit D-Min (Calcium 600+D Plus Minerals) 600-400 MG-UNIT chewable tablet   No Yes    Chew 600 mg 2 (Two) Times a Day.    Continuous Blood Gluc  (FreeStyle Rajeev 14 Day [place]) device 12/1/2023  No Yes    1 each Daily.    Continuous Blood Gluc Sensor (FreeStyle Rajeev 14 Day Sensor) misc 12/1/2023  No Yes    1 each Daily.    exemestane (AROMASIN) 25 MG tablet   Yes Yes    Take 1 tablet by mouth Daily.    famotidine (PEPCID) 20 MG tablet   Yes Yes    Take 1 tablet by mouth 2 (Two) Times a Day As Needed for Heartburn.    insulin NPH-insulin regular (humuLIN 70/30,novoLIN 70/30) (70-30) 100 UNIT/ML injection   Yes Yes    Inject 10-15 Units under the skin into the appropriate area as directed Every Morning.    insulin NPH-insulin regular (humuLIN 70/30,novoLIN 70/30)  "(70-30) 100 UNIT/ML injection   Yes Yes    Inject 5 Units under the skin into the appropriate area as directed Every Evening.    Insulin Pen Needle (B-D UF III MINI PEN NEEDLES) 31G X 5 MM misc 12/1/2023  No Yes    Use to inject insulin twice daily   DX: E11.9    Insulin Syringe-Needle U-100 28G X 1/2\" 1 ML misc 12/1/2023  No Yes    1 each 2 (Two) Times a Day.    losartan (Cozaar) 50 MG tablet   No Yes    Take 1 tablet by mouth Daily. Discontinue the lisinopril due to interaction with new chemotherapy    rosuvastatin (Crestor) 20 MG tablet   No Yes    Take 1 tablet by mouth Every Night.              Allergies:  Allergies   Allergen Reactions    Promethazine Other (See Comments)     Hyperactive mean    Tape Other (See Comments)     .blisters         Objective      Vitals:   Temp:  [97.9 °F (36.6 °C)-98.6 °F (37 °C)] 97.9 °F (36.6 °C)  Heart Rate:  [60-70] 60  Resp:  [16] 16  BP: (129-165)/(58-95) 162/74  Body mass index is 26.26 kg/m².  Physical Exam  Constitutional:       General: She is not in acute distress.  HENT:      Head: Normocephalic and atraumatic.      Right Ear: External ear normal.      Left Ear: External ear normal.      Nose: Nose normal.   Pulmonary:      Effort: No respiratory distress.   Musculoskeletal:         General: No deformity.      Cervical back: Normal range of motion.   Skin:     Coloration: Skin is not jaundiced.      Findings: No erythema.   Neurological:      Mental Status: She is alert.   Psychiatric:         Mood and Affect: Mood normal.         Behavior: Behavior normal.         Diagnostic Data:  Lab Results (last 24 hours)       Procedure Component Value Units Date/Time    POC Glucose Once [[id number]]  (Abnormal) Collected: 12/01/23 2124    Specimen: Blood Updated: 12/01/23 2126     Glucose 111 mg/dL      Comment: Serial Number: [id number]:  [id number]       Basic Metabolic Panel [[id number]]  (Abnormal) Collected: 12/01/23 1329    Specimen: Blood Updated: 12/01/23 1359     " Glucose 85 mg/dL      BUN 10 mg/dL      Creatinine 0.63 mg/dL      Sodium 140 mmol/L      Potassium 3.3 mmol/L      Chloride 102 mmol/L      CO2 24.0 mmol/L      Calcium 9.7 mg/dL      BUN/Creatinine Ratio 15.9     Anion Gap 14.0 mmol/L      eGFR 101.1 mL/min/1.73     Narrative:      GFR Normal >60  Chronic Kidney Disease <60  Kidney Failure <15      Single High Sensitivity Troponin T [[id number]]  (Abnormal) Collected: 12/01/23 1329    Specimen: Blood Updated: 12/01/23 1359     HS Troponin T 19 ng/L     Narrative:      High Sensitive Troponin T Reference Range:  <14.0 ng/L- Negative Female for AMI  <22.0 ng/L- Negative Male for AMI  >=14 - Abnormal Female indicating possible myocardial injury.  >=22 - Abnormal Male indicating possible myocardial injury.   Clinicians would have to utilize clinical acumen, EKG, Troponin, and serial changes to determine if it is an Acute Myocardial Infarction or myocardial injury due to an underlying chronic condition.         Magnesium [[id number]]  (Normal) Collected: 12/01/23 1329    Specimen: Blood Updated: 12/01/23 1359     Magnesium 2.1 mg/dL     Extra Tubes [[id number]] Collected: 12/01/23 1225    Specimen: Blood, Venous Line Updated: 12/01/23 1330    Narrative:      The following orders were created for panel order Extra Tubes.  Procedure                               Abnormality         Status                     ---------                               -----------         ------                     Light Blue Top[[id number]]                                   Final result                 Please view results for these tests on the individual orders.    Light Blue Top [[id number]] Collected: 12/01/23 1225    Specimen: Blood Updated: 12/01/23 1330     Extra Tube Hold for add-ons.     Comment: Auto resulted       CBC & Differential [[id number]]  (Abnormal) Collected: 12/01/23 1225    Specimen: Blood Updated: 12/01/23 1232    Narrative:      The following orders were created for panel  order CBC & Differential.  Procedure                               Abnormality         Status                     ---------                               -----------         ------                     CBC Auto Differential[[id number]]        Abnormal            Final result                 Please view results for these tests on the individual orders.    CBC Auto Differential [[id number]]  (Abnormal) Collected: 12/01/23 1225    Specimen: Blood Updated: 12/01/23 1232     WBC 2.00 10*3/mm3      RBC 4.16 10*6/mm3      Hemoglobin 11.8 g/dL      Hematocrit 36.2 %      MCV 86.9 fL      MCH 28.3 pg      MCHC 32.5 g/dL      RDW 14.1 %      RDW-SD 45.5 fl      MPV 7.3 fL      Platelets 95 10*3/mm3      Neutrophil % 66.6 %      Lymphocyte % 21.2 %      Monocyte % 10.2 %      Eosinophil % 0.9 %      Basophil % 1.1 %      Neutrophils, Absolute 1.30 10*3/mm3      Lymphocytes, Absolute 0.40 10*3/mm3      Monocytes, Absolute 0.20 10*3/mm3      Eosinophils, Absolute 0.00 10*3/mm3      Basophils, Absolute 0.00 10*3/mm3      nRBC 0.1 /100 WBC              Imaging Results (Last 24 Hours)       Procedure Component Value Units Date/Time    XR Chest 1 View [[id number]] Collected: 12/01/23 1236     Updated: 12/01/23 1240    Narrative:      XR CHEST 1 VW    Date of Exam: 12/1/2023 12:31 PM EST    Indication: syncope    Comparison: CT chest 8/7/2023, PA and lateral chest 3/7/2023    Findings:  Moderate cardiomegaly. Median sternotomy changes are present. Central pulmonary vasculature appears enlarged, which may represent vascular congestion. No overt features of pulmonary edema. No acute airspace disease. Left chest wall pacemaker leads appear   appropriately positioned. Vascular stent material is seen in the proximal descending thoracic aorta. Anterior cervical spinal fusion hardware is seen in the lower cervical spine. Mid thoracic dextroscoliotic curvature is present.        Impression:      Impression:  Stable mild cardiac  enlargement.  Central pulmonary vasculature enlargement is present, which may represent mild congestive change. There are no overt features of edema.        Electronically Signed: Kym Antony MD    12/1/2023 12:38 PM EST    Workstation ID: [id number]              Assessment & Plan        This is a 63 y.o. female with:    Active and Resolved Problems  Active Hospital Problems    Diagnosis  POA    **Pacemaker complications [T82.9XXA]  Yes    Fall against object [W18.00XA]  Yes    Syncope, unspecified syncope type [R55]  Yes    AV block, complete [I44.2]  Yes    Malignant neoplasm metastatic to bone [C79.51]  Yes    Controlled type 2 diabetes mellitus without complication, with long-term current use of insulin [E11.9, Z79.4]  Not Applicable    Malignant tumor of breast [C50.919]  Yes    Hyperlipidemia [E78.5]  Yes    Hypertensive disorder [I10]  Yes      Resolved Hospital Problems   No resolved problems to display.       #Syncope  #AV Block  #Pacemaker complications  Seeing how she has had this happen in the past, most obvious etiology would be her pacemaker.  Before exploring other etiologies, would be reasonable to rule out a pacemaker issue.  - Telemetry  - Cardiology consulted, appreciate recs    #Fall  #Bone mets  #Breast Cancer  Patient has a pain pump which delivers about 8mg of morphine a day at baseline.  She is having pain from impact from earlier.  - Continue basal pain pump  - Added additional pain medications for break through    #HTN  #HLD  HDS.  - Continue home medications    #DMT2  Uses NPH 10units BID  - Continue home insulin  - SSI      DVT prophylaxis:  Medical DVT prophylaxis orders are present.    The patient desires to be as follows:    CODE STATUS:    Level Of Support Discussed With: Patient  Code Status (Patient has no pulse and is not breathing): CPR (Attempt to Resuscitate)  Medical Interventions (Patient has pulse or is breathing): Full Support        Lavon Bladimir, who can be contacted at  [phone], is the designated person to make medical decisions on the patient's behalf if She is incapable of doing so. This was clarified with patient and/or next of kin on 12/1/2023 during the course of this H&P.    Admission Status:  I believe this patient meets obhservation status.    Expected Length of Stay: 1-2 nights    PDMP and Medication Dispenses via Sidebar reviewed and consistent with patient reported medications.    I discussed the patient's findings and my recommendations with patient.      Signature:     This document has been electronically signed by Gerson Peters MD on December 1, 2023 21:59 [facility] Hospitalist Team

## 2023-12-02 NOTE — PROGRESS NOTES
CARDIOLOGY PROGRESS NOTE:    Gladys Garrison  63 y.o.  female  1960  [id number]      Referring Provider: Hospitalist    Reason for follow-up: Syncope     Patient Care Team:  Chirag Robles DO as PCP - General (Family Medicine)  Мария Nunez MD as Consulting Physician (Hematology and Oncology)  Steven Hammond MD as Consulting Physician (Cardiology)    Subjective .  Patient is doing well without any symptoms today    Objective lying in bed comfortably     Review of Systems   Constitutional: Negative for malaise/fatigue.   Cardiovascular:  Negative for chest pain, dyspnea on exertion, leg swelling and palpitations.   Respiratory:  Negative for cough and shortness of breath.    Gastrointestinal:  Negative for abdominal pain, nausea and vomiting.   Neurological:  Negative for dizziness, focal weakness, headaches, light-headedness and numbness.   All other systems reviewed and are negative.      Allergies: Oxycodone, Promethazine, and Tape    Scheduled Meds:aspirin, 81 mg, Oral, Daily  heparin (porcine), 5,000 Units, Subcutaneous, Q8H  insulin lispro, 2-9 Units, Subcutaneous, 4x Daily AC & at Bedtime  insulin NPH-insulin regular, 10 Units, Subcutaneous, Q12H  losartan, 50 mg, Oral, Daily  rosuvastatin, 20 mg, Oral, Nightly  senna-docusate sodium, 2 tablet, Oral, BID  sodium chloride, 10 mL, Intravenous, Q12H      Continuous Infusions:   PRN Meds:.  acetaminophen    senna-docusate sodium **AND** polyethylene glycol **AND** bisacodyl **AND** bisacodyl    calcium carbonate    Calcium Replacement - Follow Nurse / BPA Driven Protocol    dextrose    dextrose    famotidine    glucagon (human recombinant)    hydrALAZINE    HYDROmorphone    ketorolac    Magnesium Standard Dose Replacement - Follow Nurse / BPA Driven Protocol    methocarbamol    ondansetron **OR** ondansetron    oxyCODONE    Phosphorus Replacement - Follow Nurse / BPA Driven Protocol    Potassium Replacement - Follow Nurse / BPA Driven  "Protocol    [COMPLETED] Insert Peripheral IV **AND** sodium chloride    sodium chloride    sodium chloride        VITAL SIGNS  Vitals:    12/02/23 0500 12/02/23 0900 12/02/23 1112 12/02/23 1300   BP:  (!) 181/107 (!) 176/101 168/82   BP Location:  Right arm  Right arm   Patient Position:  Lying  Lying   Pulse:  68 60 73   Resp:  16  20   Temp:  97.8 °F (36.6 °C)  98.2 °F (36.8 °C)   TempSrc:  Oral  Oral   SpO2:  96%  93%   Weight: 63.1 kg (139 lb 1.8 oz)      Height:           Flowsheet Rows      Flowsheet Row First Filed Value   Admission Height 154.9 cm (61\") Documented at 12/01/2023 1202   Admission Weight 63 kg (139 lb) Documented at 12/01/2023 1202             TELEMETRY: Pacemaker rhythm    Physical Exam:  Constitutional:       Appearance: Well-developed.   Eyes:      General: No scleral icterus.     Conjunctiva/sclera: Conjunctivae normal.   HENT:      Head: Normocephalic and atraumatic.   Neck:      Vascular: No carotid bruit or JVD.   Pulmonary:      Effort: Pulmonary effort is normal.      Breath sounds: Normal breath sounds. No wheezing. No rales.   Cardiovascular:      Normal rate. Regular rhythm.   Pulses:     Intact distal pulses.   Abdominal:      General: Bowel sounds are normal.      Palpations: Abdomen is soft.   Musculoskeletal:      Cervical back: Normal range of motion and neck supple. Skin:     General: Skin is warm and dry.      Findings: No rash.   Neurological:      Mental Status: Alert.          Results Review:   I reviewed the patient's new clinical results.  Lab Results (last 24 hours)       Procedure Component Value Units Date/Time    POC Glucose Once [[id number]]  (Normal) Collected: 12/02/23 1110    Specimen: Blood Updated: 12/02/23 1111     Glucose 87 mg/dL      Comment: Serial Number: [id number]:  [id number]       POC Glucose Once [[id number]]  (Normal) Collected: 12/02/23 0717    Specimen: Blood Updated: 12/02/23 0719     Glucose 81 mg/dL      Comment: Serial Number: " [id number]:  [id number]       Scan Slide [[id number]] Collected: 12/02/23 0232    Specimen: Blood Updated: 12/02/23 0346     Scan Slide --     Comment: See Manual Differential Results       Manual Differential [[id number]]  (Abnormal) Collected: 12/02/23 0232    Specimen: Blood Updated: 12/02/23 0346     Neutrophil % 59.0 %      Lymphocyte % 30.0 %      Monocyte % 8.0 %      Myelocyte % 3.0 %      Neutrophils Absolute 1.06 10*3/mm3      Lymphocytes Absolute 0.54 10*3/mm3      Monocytes Absolute 0.14 10*3/mm3      RBC Morphology Normal     WBC Morphology Normal     Platelet Estimate Decreased    CBC Auto Differential [[id number]]  (Abnormal) Collected: 12/02/23 0232    Specimen: Blood Updated: 12/02/23 0346     WBC 1.80 10*3/mm3      RBC 3.87 10*6/mm3      Hemoglobin 10.9 g/dL      Hematocrit 34.0 %      MCV 87.9 fL      MCH 28.1 pg      MCHC 32.0 g/dL      RDW 14.2 %      RDW-SD 46.8 fl      MPV 8.7 fL      Platelets 84 10*3/mm3     Narrative:      The previously reported component NRBC is no longer being reported. Previous result was 0.2 /100 WBC (Reference Range: 0.0-0.2 /100 WBC) on 12/2/2023 at 0306 EST.    Comprehensive Metabolic Panel [[id number]]  (Abnormal) Collected: 12/02/23 0232    Specimen: Blood Updated: 12/02/23 0314     Glucose 85 mg/dL      BUN 13 mg/dL      Creatinine 0.77 mg/dL      Sodium 142 mmol/L      Potassium 3.6 mmol/L      Comment: Specimen hemolyzed.  Result may be falsely elevated.        Chloride 106 mmol/L      CO2 27.0 mmol/L      Calcium 9.1 mg/dL      Total Protein 6.4 g/dL      Albumin 3.4 g/dL      ALT (SGPT) 13 U/L      Comment: Specimen hemolyzed.  Result may  be falsely elevated.        AST (SGOT) 29 U/L      Comment: Specimen hemolyzed.  Result may be falsely elevated.        Alkaline Phosphatase 67 U/L      Total Bilirubin 0.3 mg/dL      Globulin 3.0 gm/dL      A/G Ratio 1.1 g/dL      BUN/Creatinine Ratio 16.9     Anion Gap 9.0 mmol/L      eGFR 87.9 mL/min/1.73      Narrative:      GFR Normal >60  Chronic Kidney Disease <60  Kidney Failure <15      POC Glucose Once [[id number]]  (Abnormal) Collected: 12/01/23 2124    Specimen: Blood Updated: 12/01/23 2126     Glucose 111 mg/dL      Comment: Serial Number: [id number]:  [id number]               Imaging Results (Last 24 Hours)       ** No results found for the last 24 hours. **            EKG      I personally viewed and interpreted the patient's EKG/Telemetry data:    ECHOCARDIOGRAM:  Results for orders placed during the hospital encounter of 03/07/23    Adult Transthoracic Echo Complete W/ Cont if Necessary Per Protocol    Interpretation Summary    Left ventricular systolic function is normal. Left ventricular ejection fraction appears to be 56 - 60%.    Left ventricular diastolic function is consistent with (grade Ia w/high LAP) impaired relaxation.    Severely reduced right ventricular systolic function noted.    The right ventricular cavity is moderately dilated.    The right atrial cavity is moderate to severely  dilated.    There is moderate, bileaflet mitral valve thickening present.    Severe tricuspid valve regurgitation is present.    Estimated right ventricular systolic pressure from tricuspid regurgitation is mildly elevated (35-45 mmHg).    Mild pulmonary hypertension is present.    There is a trivial pericardial effusion. There is no evidence of cardiac tamponade.       STRESS MYOVIEW:       CARDIAC CATHETERIZATION:  No results found for this or any previous visit.       OTHER:         Assessment & Plan     Principal Problem:    Pacemaker complications  Active Problems:    Hyperlipidemia    Hypertensive disorder    Malignant tumor of breast    Controlled type 2 diabetes mellitus without complication, with long-term current use of insulin    Malignant neoplasm metastatic to bone    AV block, complete    Syncope, unspecified syncope type    Fall against object       Syncope  Patient presented with syncope and is  monitored without any arrhythmias  Patient had a [employer] pacemaker which has been checked which does not show any arrhythmias but has a atrial lead which is not working very well and hence will be seen by electrophysiologist.  Patient does not need any further cardiac workup.    Complete heart block status post pacemaker placement  Patient's pacemaker is working very well  Patient is atrial lead is not working very well and will be evaluated by the electrophysiologist.    Hypertension  Patient blood pressure currently stable on medications.  Patient is currently on losartan.    Diabetes  Patient is on insulin and followed by primary care doctor.    Hyperlipidemia  Patient is on statins and the lipid levels are well within normal limits.  Patient is currently on rosuvastatin.    History of breast cancer with metastasis  Patient is seen by the oncologist.    I discussed the patients findings and my recommendations with patient and nurse    Irvin Alfaro MD  12/02/23  16:07 EST

## 2023-12-02 NOTE — PLAN OF CARE
Goal Outcome Evaluation:                        Patient has chronic pain issues from metastatic cancer in spine, has implanted morphine pump for pain, however still rates pain at an 8, BP elevated this shift possibly related to pain, however patient refuses to take pain medication   None

## 2023-12-02 NOTE — CONSULTS
CARDIOLOGY CONSULT:    Gladys Garrison  1960  female  [id number]      Referring Provider: Hospitalist  Reason for Consultation: Syncope    Patient Care Team:  Chirag Robles DO as PCP - General (Family Medicine)  Мария Nunez MD as Consulting Physician (Hematology and Oncology)  Steven Hammond MD as Consulting Physician (Cardiology)    Chief complaint syncope    Subjective .     History of present illness:  Gladys Garrison is a 63 y.o. female with history of metastatic breast cancer to the bone coronary disease history of complete heart block presented to the hospital with syncope.  Patient also has history of hypertension hyperlipidemia obstructive sleep apnea and pacemaker placement.  No complains of any chest pain or shortness of breath.  No PND orthopnea.  No palpitations dizziness.  Patient states that she was at work when she suddenly collapsed.  No signs of any seizure activity.  Patient was brought to the hospital bed to the hospital.    Review of Systems   Constitutional: Negative for fever and malaise/fatigue.   HENT:  Negative for ear pain and nosebleeds.    Eyes:  Negative for blurred vision and double vision.   Cardiovascular:  Positive for syncope. Negative for chest pain, dyspnea on exertion and palpitations.   Respiratory:  Negative for cough and shortness of breath.    Skin:  Negative for rash.   Musculoskeletal:  Negative for joint pain.   Gastrointestinal:  Negative for abdominal pain, nausea and vomiting.   Neurological:  Negative for focal weakness and headaches.   Psychiatric/Behavioral:  Negative for depression. The patient is not nervous/anxious.    All other systems reviewed and are negative.      History  Past Medical History:   Diagnosis Date    Allergic     Bone cancer     METASTATIC BONE    Bradycardia     SECONDARY TO ABLATION    Breast cancer 2017    mets to lymph nodes; did not do radiation    Cancer of unknown origin     Compression fx, thoracic spine,  open, initial encounter     Coronary artery disease     COVID-19 2021    Diabetes mellitus     Heart disease, unspecified     Hepatitis C     RESOLVED WITH MEDICATION    Hyperlipidemia     Hypertension     Obesity (BMI 30-39.9) 2021    Rib fracture     Per patient    Sleep apnea     no machine    Tachycardia     ATRIAL    Type 2 diabetes mellitus 2017       Past Surgical History:   Procedure Laterality Date    BACK SURGERY      neck X 2    BREAST RECONSTRUCTION Bilateral     CARDIAC ABLATION       atrial tachycardia x 5 ablations     CARDIAC CATHETERIZATION      CARDIAC SURGERY      stent placed in aorta    CARDIAC SURGERY      6 surgeries as baby     CERVICAL FUSION ANTERIOR WITH ARTIFICIAL DISCECTOMY IMPLANTATION N/A 2020    Procedure: C4 VERTEBRECTOMY AND ANTERIOR CERIVCAL DISCECTOMY WITH FUSION OF CERVICAL THREE THROUGH FIVE WITH REMOVAL OF HARDWARE C5-C6;  Surgeon: Mark Kaba MD;  Location: [facility];  Service: Neurosurgery;  Laterality: N/A;     SECTION      x2    COLONOSCOPY N/A 10/23/2020    Procedure: COLONOSCOPY WITH POLYPECTOMY X6;  Surgeon: Stanley Bourne MD;  Location: [facility] ENDOSCOPY;  Service: Gastroenterology;  Laterality: N/A;  POLYPS, INTERNAL HEMORRHOIDS    ENDOMETRIAL ABLATION      REMOVAL SCAR TISSUE UTERINE    ENDOSCOPY N/A 10/23/2020    Procedure: ESOPHAGOGASTRODUODENOSCOPY WITH BIOPSY X 1 AREA;  Surgeon: Stanley Bourne MD;  Location: [facility] ENDOSCOPY;  Service: Gastroenterology;  Laterality: N/A;  GASTRITIS, ESOPHAGITIS, HIATAL HERNIA    KNEE SURGERY      MASTECTOMY Bilateral     NECK SURGERY      PACEMAKER IMPLANTATION      PAIN PUMP INSERTION/REVISION N/A 2022    Procedure: PAIN PUMP INSERTION AND INTRATHECAL CATHETER PLACEMENT;  Surgeon: Chuck Hunter MD;  Location: [facility] OR;  Service: Pain Management;  Laterality: N/A;       Family History   Problem Relation Age of Onset    Heart disease Mother      Stroke Mother     Lung cancer Mother     Aneurysm Father     Diabetes Sister     No Known Problems Brother     No Known Problems Brother     Diabetes type I Half-Sister     Thyroid cancer Half-Sister     Cancer Maternal Aunt     Heart attack Sister     Thyroid disease Sister     No Known Problems Sister        Social History     Tobacco Use    Smoking status: Never     Passive exposure: Never    Smokeless tobacco: Never   Vaping Use    Vaping Use: Never used   Substance Use Topics    Alcohol use: Yes     Alcohol/week: 1.0 standard drink of alcohol     Types: 1 Glasses of wine per week     Comment: mixed drink once a week    Drug use: Not Currently        (Not in a hospital admission)      Allergies: Promethazine and Tape    Scheduled Meds:[START ON 12/2/2023] aspirin, 81 mg, Oral, Daily  heparin (porcine), 5,000 Units, Subcutaneous, Q8H  insulin lispro, 2-9 Units, Subcutaneous, 4x Daily AC & at Bedtime  insulin NPH-insulin regular, 10 Units, Subcutaneous, Q12H  losartan, 50 mg, Oral, Daily  rosuvastatin, 20 mg, Oral, Nightly  senna-docusate sodium, 2 tablet, Oral, BID  sodium chloride, 10 mL, Intravenous, Q12H      Continuous Infusions:   PRN Meds:.  acetaminophen    senna-docusate sodium **AND** polyethylene glycol **AND** bisacodyl **AND** bisacodyl    calcium carbonate    Calcium Replacement - Follow Nurse / BPA Driven Protocol    dextrose    dextrose    famotidine    glucagon (human recombinant)    HYDROmorphone    Magnesium Standard Dose Replacement - Follow Nurse / BPA Driven Protocol    methocarbamol    ondansetron **OR** ondansetron    oxyCODONE    Phosphorus Replacement - Follow Nurse / BPA Driven Protocol    Potassium Replacement - Follow Nurse / BPA Driven Protocol    [COMPLETED] Insert Peripheral IV **AND** sodium chloride    sodium chloride    sodium chloride    Objective     VITAL SIGNS  Vitals:    12/01/23 1500 12/01/23 1513 12/01/23 1607 12/01/23 1918   BP:   129/58 162/74   BP Location:    Right arm  "  Patient Position:    Lying   Pulse: 70 60 60 60   Resp: 16   16   Temp:    97.9 °F (36.6 °C)   TempSrc:    Oral   SpO2: 98% 96% 94% 94%   Weight:       Height:           Flowsheet Rows      Flowsheet Row First Filed Value   Admission Height 154.9 cm (61\") Documented at 12/01/2023 1202   Admission Weight 63 kg (139 lb) Documented at 12/01/2023 1202             TELEMETRY: Ventricular pacemaker rhythm    Physical Exam:  Constitutional:       Appearance: Well-developed.   Eyes:      General: No scleral icterus.     Conjunctiva/sclera: Conjunctivae normal.      Pupils: Pupils are equal, round, and reactive to light.   HENT:      Head: Normocephalic and atraumatic.   Neck:      Vascular: No carotid bruit or JVD.   Pulmonary:      Effort: Pulmonary effort is normal.      Breath sounds: Normal breath sounds. No wheezing. No rales.   Cardiovascular:      Normal rate. Regular rhythm.   Pulses:     Intact distal pulses.   Abdominal:      General: Bowel sounds are normal.      Palpations: Abdomen is soft.   Musculoskeletal: Normal range of motion.      Cervical back: Normal range of motion and neck supple. Skin:     General: Skin is warm and dry.      Findings: No rash.   Neurological:      Mental Status: Alert.      Comments: No focal deficits          Results Review:   I reviewed the patient's new clinical results.  Lab Results (last 24 hours)       Procedure Component Value Units Date/Time    POC Glucose Once [[id number]]  (Abnormal) Collected: 12/01/23 2124    Specimen: Blood Updated: 12/01/23 2126     Glucose 111 mg/dL      Comment: Serial Number: [id number]:  [id number]       Basic Metabolic Panel [[id number]]  (Abnormal) Collected: 12/01/23 1329    Specimen: Blood Updated: 12/01/23 1359     Glucose 85 mg/dL      BUN 10 mg/dL      Creatinine 0.63 mg/dL      Sodium 140 mmol/L      Potassium 3.3 mmol/L      Chloride 102 mmol/L      CO2 24.0 mmol/L      Calcium 9.7 mg/dL      BUN/Creatinine Ratio 15.9     Anion Gap " 14.0 mmol/L      eGFR 101.1 mL/min/1.73     Narrative:      GFR Normal >60  Chronic Kidney Disease <60  Kidney Failure <15      Single High Sensitivity Troponin T [[id number]]  (Abnormal) Collected: 12/01/23 1329    Specimen: Blood Updated: 12/01/23 1359     HS Troponin T 19 ng/L     Narrative:      High Sensitive Troponin T Reference Range:  <14.0 ng/L- Negative Female for AMI  <22.0 ng/L- Negative Male for AMI  >=14 - Abnormal Female indicating possible myocardial injury.  >=22 - Abnormal Male indicating possible myocardial injury.   Clinicians would have to utilize clinical acumen, EKG, Troponin, and serial changes to determine if it is an Acute Myocardial Infarction or myocardial injury due to an underlying chronic condition.         Magnesium [[id number]]  (Normal) Collected: 12/01/23 1329    Specimen: Blood Updated: 12/01/23 1359     Magnesium 2.1 mg/dL     Extra Tubes [[id number]] Collected: 12/01/23 1225    Specimen: Blood, Venous Line Updated: 12/01/23 1330    Narrative:      The following orders were created for panel order Extra Tubes.  Procedure                               Abnormality         Status                     ---------                               -----------         ------                     Light Blue Top[[id number]]                                   Final result                 Please view results for these tests on the individual orders.    Light Blue Top [[id number]] Collected: 12/01/23 1225    Specimen: Blood Updated: 12/01/23 1330     Extra Tube Hold for add-ons.     Comment: Auto resulted       CBC & Differential [[id number]]  (Abnormal) Collected: 12/01/23 1225    Specimen: Blood Updated: 12/01/23 1232    Narrative:      The following orders were created for panel order CBC & Differential.  Procedure                               Abnormality         Status                     ---------                               -----------         ------                     CBC Auto  Differential[[id number]]        Abnormal            Final result                 Please view results for these tests on the individual orders.    CBC Auto Differential [[id number]]  (Abnormal) Collected: 12/01/23 1225    Specimen: Blood Updated: 12/01/23 1232     WBC 2.00 10*3/mm3      RBC 4.16 10*6/mm3      Hemoglobin 11.8 g/dL      Hematocrit 36.2 %      MCV 86.9 fL      MCH 28.3 pg      MCHC 32.5 g/dL      RDW 14.1 %      RDW-SD 45.5 fl      MPV 7.3 fL      Platelets 95 10*3/mm3      Neutrophil % 66.6 %      Lymphocyte % 21.2 %      Monocyte % 10.2 %      Eosinophil % 0.9 %      Basophil % 1.1 %      Neutrophils, Absolute 1.30 10*3/mm3      Lymphocytes, Absolute 0.40 10*3/mm3      Monocytes, Absolute 0.20 10*3/mm3      Eosinophils, Absolute 0.00 10*3/mm3      Basophils, Absolute 0.00 10*3/mm3      nRBC 0.1 /100 WBC             Imaging Results (Last 24 Hours)       Procedure Component Value Units Date/Time    XR Chest 1 View [[id number]] Collected: 12/01/23 1236     Updated: 12/01/23 1240    Narrative:      XR CHEST 1 VW    Date of Exam: 12/1/2023 12:31 PM EST    Indication: syncope    Comparison: CT chest 8/7/2023, PA and lateral chest 3/7/2023    Findings:  Moderate cardiomegaly. Median sternotomy changes are present. Central pulmonary vasculature appears enlarged, which may represent vascular congestion. No overt features of pulmonary edema. No acute airspace disease. Left chest wall pacemaker leads appear   appropriately positioned. Vascular stent material is seen in the proximal descending thoracic aorta. Anterior cervical spinal fusion hardware is seen in the lower cervical spine. Mid thoracic dextroscoliotic curvature is present.        Impression:      Impression:  Stable mild cardiac enlargement.  Central pulmonary vasculature enlargement is present, which may represent mild congestive change. There are no overt features of edema.        Electronically Signed: Kym Antony MD    12/1/2023 12:38 PM EST     Workstation ID: [id number]            EKG      I personally viewed and interpreted the patient's EKG/Telemetry data:    ECHOCARDIOGRAM:  Results for orders placed during the hospital encounter of 03/07/23    Adult Transthoracic Echo Complete W/ Cont if Necessary Per Protocol    Interpretation Summary    Left ventricular systolic function is normal. Left ventricular ejection fraction appears to be 56 - 60%.    Left ventricular diastolic function is consistent with (grade Ia w/high LAP) impaired relaxation.    Severely reduced right ventricular systolic function noted.    The right ventricular cavity is moderately dilated.    The right atrial cavity is moderate to severely  dilated.    There is moderate, bileaflet mitral valve thickening present.    Severe tricuspid valve regurgitation is present.    Estimated right ventricular systolic pressure from tricuspid regurgitation is mildly elevated (35-45 mmHg).    Mild pulmonary hypertension is present.    There is a trivial pericardial effusion. There is no evidence of cardiac tamponade.         STRESS MYOVIEW:       CARDIAC CATHETERIZATION:    No results found for this or any previous visit.       OTHER:         MDM      Syncope  Patient presents with syncopal episode and will be monitored for any arrhythmias  Patient is currently in a pacemaker rhythm  Patient will also have blood pressure checked  Patient will have the pacemaker checked also    History of breast cancer status post metastasis  Patient is followed by the cancer doctor    Hypertension  Patient blood pressure currently stable on medications    Hyperlipidemia  Patient is currently on statin  Lipid levels are well within normal limits    Diabetes  Patient is on oral medicines as well as insulin.  I discussed the patients findings and my recommendations with patient and nurse    Irvin Alfaro MD  12/01/23  22:57 EST

## 2023-12-02 NOTE — DISCHARGE SUMMARY
[facility] Medicine Services  Discharge Summary    Date of Service: 23  Patient Name: Gladys Garrison  : 1960  MRN: [id number]    Date of Admission: 2023  Date of Discharge:  23  Primary Care Physician: Chirag Robles,     Discharge Diagnosis: Syncope      Presenting Problem:   Pacemaker complications [T82.9XXA]  Syncope, unspecified syncope type [R55]    Active and Resolved Hospital Problems:  Active Hospital Problems    Diagnosis POA    **Pacemaker complications [T82.9XXA] Yes    Fall against object [W18.00XA] Yes    AV block, complete [I44.2] Yes    Malignant neoplasm metastatic to bone [C79.51] Yes    Controlled type 2 diabetes mellitus without complication, with long-term current use of insulin [E11.9, Z79.4] Not Applicable    Malignant tumor of breast [C50.919] Yes    Hyperlipidemia [E78.5] Yes    Hypertensive disorder [I10] Yes      Resolved Hospital Problems    Diagnosis POA    Syncope [R55] Yes    Syncope, unspecified syncope type [R55] Yes            As per the HPI of the H&P:    Gladys Garrison is a 63 y.o. female with a CMH of metastatic breast cancer to bone, CAD, Third-degree heartblock s/p pacemaker insertion, HTN, HLD, AYDEE, CAD who presented to [facility] on 2023 with syncope.     Earlier today while transporting some items at work patient collapsed.  States she blacked out and when she came to she was awoken to someone slapping her hand.  No signs of seizure such as tongue biting, loss of continence of post-ictal state.  Episode was witnessed and patient essentially collapsed hitting and landed on her left arm and shoulder.  This has happened before in the past, and it was determined at the time that the pacemaker she had settings making it too sensitive making it miss a couple of beats.  ED admitted this person to have their pacemaker interrogated and to have it adjusted.         Hospital Course:   The patient was admitted for  "evaluation of syncope that was most likely cardiogenic in origin.  Monitored without any arrhythmias noted.  Evaluated by cardiology.  The patient had a [employer]tronic pacemaker which was checked today and does not show any arrhythmias.  She does however have an atrial lead which is not working very well and as per cardiology she should follow-up with electrophysiology.  The patient was seen and examined at bedside today, 12/2/2023.  She states that she would like to go home and follow-up with electrophysiology.  Patient denies chest pain, shortness of breath, and palpitations.  In addition, she denies fever, chills, cough, nausea, vomiting, and abdominal pain.  Medically optimized for discharge.  Follow-up with electrophysiology.  Follow-up with primary care physician within 7 days of discharge.      Discharge Exam    /82 (BP Location: Right arm, Patient Position: Lying)   Pulse 73   Temp 98.2 °F (36.8 °C) (Oral)   Resp 20   Ht 154.9 cm (60.98\")   Wt 63.1 kg (139 lb 1.8 oz)   LMP  (LMP Unknown)   SpO2 93%   BMI 26.30 kg/m²     General: Not in any acute distress  HEENT: Normocephalic, atraumatic  Neck: Supple, No JVD  CV: Regular rate and rhythm, S1 and S2 are normal, no murmurs/rubs/or gallops  Lungs: Clear to auscultation bilaterally, no rales/rhonchi/wheezes  Abdomen: Soft, non-distended, non-tender, bowel sounds present  EXT: No edema of lower extremities  Neuro: Cranial nerves II through XII intact  Skin: Intact, no rashes, no lesions, no erythema    Discharge Medications:     Discharge Medications        Continue These Medications        Instructions Start Date   Accu-Chek Araceli device   Use as instructed   To test blood sugar bid      B-D UF III MINI PEN NEEDLES 31G X 5 MM misc  Generic drug: Insulin Pen Needle   Use to inject insulin twice daily   DX: E11.9      Calcium 600+D Plus Minerals 600-400 MG-UNIT chewable tablet   600 mg, Oral, 2 Times Daily      exemestane 25 MG tablet  Commonly known as: " "AROMASIN   25 mg, Oral, Daily      famotidine 20 MG tablet  Commonly known as: PEPCID   20 mg, Oral, 2 Times Daily PRN      FreeStyle Rajeev 14 Day [place] device   1 each, Does not apply, Daily      FreeStyle Rajeev 14 Day Sensor misc   1 each, Does not apply, Daily      insulin NPH-insulin regular (70-30) 100 UNIT/ML injection  Commonly known as: humuLIN 70/30,novoLIN 70/30   10-15 Units, Subcutaneous, Every Morning      insulin NPH-insulin regular (70-30) 100 UNIT/ML injection  Commonly known as: humuLIN 70/30,novoLIN 70/30   5 Units, Subcutaneous, Every Evening      Insulin Syringe-Needle U-100 28G X 1/2\" 1 ML misc   1 each, Does not apply, 2 Times Daily      losartan 50 MG tablet  Commonly known as: Cozaar   50 mg, Oral, Daily, Discontinue the lisinopril due to interaction with new chemotherapy      rosuvastatin 20 MG tablet  Commonly known as: Crestor   20 mg, Oral, Nightly                  Diet:  Hospital:  Diet Order   Procedures    Diet: Cardiac Diets, Diabetic Diets; Healthy Heart (2-3 Na+); Consistent Carbohydrate; Texture: Regular Texture (IDDSI 7); Fluid Consistency: Thin (IDDSI 0)       Discharge Disposition:  Home      Discharge Activity:   As tolerated    CODE STATUS:  Code Status and Medical Interventions:   Ordered at: 12/01/23 1646     Level Of Support Discussed With:    Patient     Code Status (Patient has no pulse and is not breathing):    CPR (Attempt to Resuscitate)     Medical Interventions (Patient has pulse or is breathing):    Full Support         Future Appointments   Date Time Provider Department Center   12/4/2023  1:35 PM LAB  [facility] NA  [facility]   12/18/2023  1:30 PM Мария Nunez MD [facility]   12/18/2023  1:30 PM LAB MD [facility] LAB NA  [facility]   1/2/2024  2:45 PM Chirag Robles DO [facility] PC [facility]   1/8/2024  2:00 PM Kourtney Flores APRN [facility] CAR NA P [facility] NA   1/8/2024  2:00 PM [facility] CARD [place] DEVICE CHECK [facility] CAR NA P [facility] NA "   3/19/2024  1:30 PM Steven Hammond MD [facility] NA CARD CTR NA   4/15/2024  1:00 PM Stanley Lopez MD [facility] CAR NA P [facility] NA         Time spent on Discharge including face to face service:  >30 minutes    Signature: Electronically signed by Kayleigh Escobar MD, 12/02/23, 16:48 EST.  [facility] Hospitalist Team

## 2023-12-03 NOTE — OUTREACH NOTE
Prep Survey      Flowsheet Row Responses   [facility] facility patient discharged from? [facility]   Is LACE score < 7 ? No   Eligibility [facility]   Hospital [facility]   Date of Admission 12/01/23   Date of Discharge 12/02/23   Discharge Disposition Home or Self Care   Discharge diagnosis Pacemaker complication   Does the patient have one of the following disease processes/diagnoses(primary or secondary)? Other   Does the patient have Home health ordered? No   Is there a DME ordered? No   Prep survey completed? Yes            MCKENZIE QUICK - Registered Nurse

## 2023-12-04 ENCOUNTER — TELEPHONE (OUTPATIENT)
Dept: CARDIOLOGY | Facility: CLINIC | Age: 63
End: 2023-12-04
Payer: MEDICARE

## 2023-12-04 ENCOUNTER — TRANSITIONAL CARE MANAGEMENT TELEPHONE ENCOUNTER (OUTPATIENT)
Dept: CALL CENTER | Facility: HOSPITAL | Age: 63
End: 2023-12-04
Payer: MEDICARE

## 2023-12-04 ENCOUNTER — SPECIALTY PHARMACY (OUTPATIENT)
Dept: PHARMACY | Facility: HOSPITAL | Age: 63
End: 2023-12-04
Payer: MEDICARE

## 2023-12-04 ENCOUNTER — LAB (OUTPATIENT)
Dept: LAB | Facility: HOSPITAL | Age: 63
End: 2023-12-04
Payer: MEDICARE

## 2023-12-04 DIAGNOSIS — C50.919 MALIGNANT NEOPLASM OF FEMALE BREAST, UNSPECIFIED ESTROGEN RECEPTOR STATUS, UNSPECIFIED LATERALITY, UNSPECIFIED SITE OF BREAST: ICD-10-CM

## 2023-12-04 DIAGNOSIS — C79.51 MALIGNANT NEOPLASM METASTATIC TO BONE: Primary | ICD-10-CM

## 2023-12-04 DIAGNOSIS — C79.51 MALIGNANT NEOPLASM METASTATIC TO BONE: ICD-10-CM

## 2023-12-04 LAB
ALBUMIN SERPL-MCNC: 4 G/DL (ref 3.5–5.2)
ALBUMIN/GLOB SERPL: 1.4 G/DL
ALP SERPL-CCNC: 77 U/L (ref 39–117)
ALT SERPL W P-5'-P-CCNC: 17 U/L (ref 1–33)
ANION GAP SERPL CALCULATED.3IONS-SCNC: 11 MMOL/L (ref 5–15)
AST SERPL-CCNC: 28 U/L (ref 1–32)
BASOPHILS # BLD AUTO: 0.01 10*3/MM3 (ref 0–0.2)
BASOPHILS NFR BLD AUTO: 0.5 % (ref 0–1.5)
BILIRUB SERPL-MCNC: 0.4 MG/DL (ref 0–1.2)
BUN SERPL-MCNC: 10 MG/DL (ref 8–23)
BUN/CREAT SERPL: 14.1 (ref 7–25)
CALCIUM SPEC-SCNC: 9.2 MG/DL (ref 8.6–10.5)
CHLORIDE SERPL-SCNC: 104 MMOL/L (ref 98–107)
CO2 SERPL-SCNC: 28 MMOL/L (ref 22–29)
CREAT SERPL-MCNC: 0.71 MG/DL (ref 0.57–1)
DEPRECATED RDW RBC AUTO: 43.9 FL (ref 37–54)
EGFRCR SERPLBLD CKD-EPI 2021: 96.9 ML/MIN/1.73
EOSINOPHIL # BLD AUTO: 0.01 10*3/MM3 (ref 0–0.4)
EOSINOPHIL NFR BLD AUTO: 0.5 % (ref 0.3–6.2)
ERYTHROCYTE [DISTWIDTH] IN BLOOD BY AUTOMATED COUNT: 13.8 % (ref 12.3–15.4)
GLOBULIN UR ELPH-MCNC: 2.9 GM/DL
GLUCOSE SERPL-MCNC: 131 MG/DL (ref 65–99)
HCT VFR BLD AUTO: 36.3 % (ref 34–46.6)
HGB BLD-MCNC: 11.7 G/DL (ref 12–15.9)
LYMPHOCYTES # BLD AUTO: 0.29 10*3/MM3 (ref 0.7–3.1)
LYMPHOCYTES NFR BLD AUTO: 15.9 % (ref 19.6–45.3)
MCH RBC QN AUTO: 29.1 PG (ref 26.6–33)
MCHC RBC AUTO-ENTMCNC: 32.2 G/DL (ref 31.5–35.7)
MCV RBC AUTO: 90.3 FL (ref 79–97)
MONOCYTES # BLD AUTO: 0.23 10*3/MM3 (ref 0.1–0.9)
MONOCYTES NFR BLD AUTO: 12.6 % (ref 5–12)
NEUTROPHILS NFR BLD AUTO: 1.28 10*3/MM3 (ref 1.7–7)
NEUTROPHILS NFR BLD AUTO: 70.5 % (ref 42.7–76)
PLATELET # BLD AUTO: 124 10*3/MM3 (ref 140–450)
PMV BLD AUTO: 9.4 FL (ref 6–12)
POTASSIUM SERPL-SCNC: 3.6 MMOL/L (ref 3.5–5.2)
PROT SERPL-MCNC: 6.9 G/DL (ref 6–8.5)
RBC # BLD AUTO: 4.02 10*6/MM3 (ref 3.77–5.28)
SODIUM SERPL-SCNC: 143 MMOL/L (ref 136–145)
WBC NRBC COR # BLD AUTO: 1.82 10*3/MM3 (ref 3.4–10.8)

## 2023-12-04 PROCEDURE — 36415 COLL VENOUS BLD VENIPUNCTURE: CPT

## 2023-12-04 PROCEDURE — 85025 COMPLETE CBC W/AUTO DIFF WBC: CPT

## 2023-12-04 PROCEDURE — 80053 COMPREHEN METABOLIC PANEL: CPT | Performed by: INTERNAL MEDICINE

## 2023-12-04 NOTE — OUTREACH NOTE
Call Center TCM Note      Flowsheet Row Responses   [facility] facility patient discharged from? [facility]   Does the patient have one of the following disease processes/diagnoses(primary or secondary)? Other   TCM attempt successful? No   Unsuccessful attempts Attempt 1   Call Status Left message            Dedra Montana MA    12/4/2023, 08:51 EST

## 2023-12-04 NOTE — TELEPHONE ENCOUNTER
Hub staff attempted to follow warm transfer process and was unsuccessful     Caller: Gladys Garrison    Relationship to patient: Self    Best call back number:  [phone]    Patient is needing: PATIENT BLACKED OUT OVER THE WEEKEND DUE TO LEADS FROM HER PACEMAKER. PATIENT IS REQUESTING PACEMAKER BATTERY REPLACED AND LEADS FIXED.

## 2023-12-04 NOTE — OUTREACH NOTE
Call Center TCM Note      Flowsheet Row Responses   [facility] facility patient discharged from? [facility]   Does the patient have one of the following disease processes/diagnoses(primary or secondary)? Other   TCM attempt successful? No   Unsuccessful attempts Attempt 2            Kristen Lee RN    12/4/2023, 15:52 EST

## 2023-12-05 ENCOUNTER — PREP FOR SURGERY (OUTPATIENT)
Dept: OTHER | Facility: HOSPITAL | Age: 63
End: 2023-12-05
Payer: MEDICARE

## 2023-12-05 ENCOUNTER — TRANSITIONAL CARE MANAGEMENT TELEPHONE ENCOUNTER (OUTPATIENT)
Dept: CALL CENTER | Facility: HOSPITAL | Age: 63
End: 2023-12-05
Payer: MEDICARE

## 2023-12-05 DIAGNOSIS — I44.2 COMPLETE HEART BLOCK: Primary | ICD-10-CM

## 2023-12-05 NOTE — TELEPHONE ENCOUNTER
I placed the orders for the procedure, which will include addition of new RV lead which is malfunctioning along with new pacemaker generator.  No labs are ordered since she has very recent labs.    Procedure will be done Monday after my existing cases.  Please call the patient and conveyed this message.

## 2023-12-05 NOTE — TELEPHONE ENCOUNTER
Please call patient and give her standard instructions for device replacement with over night stay.

## 2023-12-05 NOTE — TELEPHONE ENCOUNTER
DM.. Hub just called over. Patient called saying she does not want the lead replaced she wants a brand new pacemaker. She said if that is not what the procedure is going to be she is not coming. She hung up on hub.

## 2023-12-05 NOTE — TELEPHONE ENCOUNTER
Spoke with patient to clarify exactly what she is getting done for the procedure. She verbally agreed to having the lead and battery replaced.

## 2023-12-05 NOTE — OUTREACH NOTE
Call Center TCM Note      Flowsheet Row Responses   [facility] patient discharged from? [facility]   Does the patient have one of the following disease processes/diagnoses(primary or secondary)? Other   TCM attempt successful? Yes   Call start time 1513   Call end time 1518   Discharge diagnosis Pacemaker complication   Person spoke with today (if not patient) and relationship pt   Meds reviewed with patient/caregiver? N/A   Is the patient having any side effects they believe may be caused by any medication additions or changes? No   Does the patient have all medications ordered at discharge? N/A   Prescription comments Pt was angry about some things and could not ask questions   Is the patient taking all medications as directed (includes completed medication regime)? N/A   Does the patient have an appointment with their PCP within 7-14 days of discharge? No   Nursing Interventions Patient declined scheduling/rescheduling appointment at this time   Psychosocial issues? Yes   Psychosocial comments Pt was very angry upon call   What is the patient's perception of their health status since discharge? Improving   Is the patient/caregiver able to teach back signs and symptoms related to disease process for when to call PCP? Yes   Is the patient/caregiver able to teach back signs and symptoms related to disease process for when to call 911? Yes   Is the patient/caregiver able to teach back the hierarchy of who to call/visit for symptoms/problems? PCP, Specialist, Home health nurse, Urgent Care, ED, 911 Yes   If the patient is a current smoker, are they able to teach back resources for cessation? Not a smoker   TCM call completed? Yes   Wrap up additional comments Pt was very emotional at time of call and did not ask any questions. Pt wanted to call her cardiologist at time of this call related to having her pacemaker changed because of recent complications.   Call end time 1518   Would this patient benefit from a  Referral to [facility] Social Work? No   Is the patient interested in additional calls from an ambulatory ? No            Mary Bravo RN    12/5/2023, 15:22 EST

## 2023-12-05 NOTE — TELEPHONE ENCOUNTER
PATIENT CALLING BACK THIS MORNING. SHE WANTS TO MOVE FORWARD WITH GETTING THE LEADS REPLACED ON HER PACEMAKER. SHE ONLY HAS 3 YEARS TO LIVE WITH CANCER AND WANTS TO GET THIS SURGERY DONE SOON. SHE WANTS CALL BACK TODAY.

## 2023-12-06 PROBLEM — I44.2 COMPLETE HEART BLOCK: Status: ACTIVE | Noted: 2023-12-05

## 2023-12-07 ENCOUNTER — TELEPHONE (OUTPATIENT)
Dept: ONCOLOGY | Facility: CLINIC | Age: 63
End: 2023-12-07
Payer: MEDICARE

## 2023-12-07 NOTE — TELEPHONE ENCOUNTER
Caller: Gladys Garrison    Relationship: Self    Best call back number: [phone]    What is the best time to reach you: ANY.LEAVE VM    Who are you requesting to speak with (clinical staff, provider,  specific staff member): CLIINCAL        What was the call regarding: PATIENT CALLED TO GO OVER LAB RESULTS AND WANTED TO KNOW WHEN TO START CHEMO PILLS    Is it okay if the provider responds through MyChart: NO

## 2023-12-08 ENCOUNTER — SPECIALTY PHARMACY (OUTPATIENT)
Dept: PHARMACY | Facility: HOSPITAL | Age: 63
End: 2023-12-08
Payer: MEDICARE

## 2023-12-08 ENCOUNTER — TELEPHONE (OUTPATIENT)
Dept: ONCOLOGY | Facility: CLINIC | Age: 63
End: 2023-12-08
Payer: MEDICARE

## 2023-12-08 NOTE — PROGRESS NOTES
Specialty Pharmacy Note: Verzenio (abemaciclib)    Spoke to patient via telephone. She will resume abemaciclib on 12/12/23 after procedure scheduled for 12/11/23.  She expressed understanding and will call if there is any issue.  She does report increased pain and plans to talk to Dr. Nunez at her upcoming appointment about pain management options.      Thanks,    Eda HIRSCH, PharmD

## 2023-12-08 NOTE — TELEPHONE ENCOUNTER
Specialty Pharmacy Note: Abemaciclib    Left Darangela a message to call pharmacy at [phone] regarding restarting abemaciclib.  Per Dr. Nunez, patient should continue to hold until her procedure is completed that is scheduled for 12/11/23. Patient can resume the day after her procedure on 12/12/23.        Thanks,    Eda HIRSCH, PharmD

## 2023-12-08 NOTE — TELEPHONE ENCOUNTER
PATIENT CALLED IN THROUGH HUB.  SHE WAS RETURNING HALLEY ESQUEDA CALL FROM YESTERDAY.  I RELAYED AND CONFIRMED TELEPHONE MESSAGE WITH PATIENT.  SHE HAD NO FURTHER QUESTIONS.

## 2023-12-11 ENCOUNTER — APPOINTMENT (OUTPATIENT)
Dept: GENERAL RADIOLOGY | Facility: HOSPITAL | Age: 63
End: 2023-12-11
Payer: MEDICARE

## 2023-12-11 ENCOUNTER — READMISSION MANAGEMENT (OUTPATIENT)
Dept: CALL CENTER | Facility: HOSPITAL | Age: 63
End: 2023-12-11
Payer: MEDICARE

## 2023-12-11 ENCOUNTER — ANESTHESIA EVENT (OUTPATIENT)
Dept: CARDIOLOGY | Facility: HOSPITAL | Age: 63
End: 2023-12-11
Payer: MEDICARE

## 2023-12-11 ENCOUNTER — HOSPITAL ENCOUNTER (OUTPATIENT)
Facility: HOSPITAL | Age: 63
Discharge: HOME | End: 2023-12-12
Attending: INTERNAL MEDICINE | Admitting: INTERNAL MEDICINE
Payer: MEDICARE

## 2023-12-11 ENCOUNTER — ANESTHESIA (OUTPATIENT)
Dept: CARDIOLOGY | Facility: HOSPITAL | Age: 63
End: 2023-12-11
Payer: MEDICARE

## 2023-12-11 DIAGNOSIS — I44.2 COMPLETE HEART BLOCK: ICD-10-CM

## 2023-12-11 LAB — GLUCOSE BLDC GLUCOMTR-MCNC: 71 MG/DL (ref 70–105)

## 2023-12-11 PROCEDURE — 71045 X-RAY EXAM CHEST 1 VIEW: CPT

## 2023-12-11 PROCEDURE — C2621 PMKR, OTHER THAN SING/DUAL: HCPCS | Performed by: INTERNAL MEDICINE

## 2023-12-11 PROCEDURE — 25010000002 FENTANYL CITRATE (PF) 100 MCG/2ML SOLUTION: Performed by: NURSE ANESTHETIST, CERTIFIED REGISTERED

## 2023-12-11 PROCEDURE — 25010000002 MIDAZOLAM PER 1 MG: Performed by: NURSE ANESTHETIST, CERTIFIED REGISTERED

## 2023-12-11 PROCEDURE — 25010000002 PROPOFOL 1000 MG/100ML EMULSION: Performed by: NURSE ANESTHETIST, CERTIFIED REGISTERED

## 2023-12-11 PROCEDURE — 63710000001 ROSUVASTATIN 10 MG TABLET: Performed by: INTERNAL MEDICINE

## 2023-12-11 PROCEDURE — 25010000002 MEPERIDINE PER 100 MG: Performed by: NURSE ANESTHETIST, CERTIFIED REGISTERED

## 2023-12-11 PROCEDURE — 33233 REMOVAL OF PM GENERATOR: CPT | Performed by: INTERNAL MEDICINE

## 2023-12-11 PROCEDURE — 25010000002 CEFAZOLIN PER 500 MG: Performed by: INTERNAL MEDICINE

## 2023-12-11 PROCEDURE — C1769 GUIDE WIRE: HCPCS | Performed by: INTERNAL MEDICINE

## 2023-12-11 PROCEDURE — C1889 IMPLANT/INSERT DEVICE, NOC: HCPCS | Performed by: INTERNAL MEDICINE

## 2023-12-11 PROCEDURE — 82948 REAGENT STRIP/BLOOD GLUCOSE: CPT

## 2023-12-11 PROCEDURE — C1887 CATHETER, GUIDING: HCPCS | Performed by: INTERNAL MEDICINE

## 2023-12-11 PROCEDURE — 33207 INSERT HEART PM VENTRICULAR: CPT | Performed by: INTERNAL MEDICINE

## 2023-12-11 PROCEDURE — 25510000001 IOPAMIDOL PER 1 ML: Performed by: INTERNAL MEDICINE

## 2023-12-11 PROCEDURE — A9270 NON-COVERED ITEM OR SERVICE: HCPCS | Performed by: INTERNAL MEDICINE

## 2023-12-11 PROCEDURE — C1894 INTRO/SHEATH, NON-LASER: HCPCS | Performed by: INTERNAL MEDICINE

## 2023-12-11 PROCEDURE — C1898 LEAD, PMKR, OTHER THAN TRANS: HCPCS | Performed by: INTERNAL MEDICINE

## 2023-12-11 PROCEDURE — 25810000003 SODIUM CHLORIDE 0.9 % SOLUTION: Performed by: NURSE ANESTHETIST, CERTIFIED REGISTERED

## 2023-12-11 DEVICE — ENV PM AIGISRX ANTIBAC RESORB 2.5X2.7IN MD: Type: IMPLANTABLE DEVICE | Status: FUNCTIONAL

## 2023-12-11 DEVICE — GEN PM PERCEPTA MRI CRTP: Type: IMPLANTABLE DEVICE | Status: FUNCTIONAL

## 2023-12-11 DEVICE — IMPLANTABLE DEVICE: Type: IMPLANTABLE DEVICE | Status: FUNCTIONAL

## 2023-12-11 RX ORDER — MORPHINE SULFATE 8 MG/ML
8 INJECTION, SOLUTION INTRAMUSCULAR; INTRAVENOUS DAILY
COMMUNITY

## 2023-12-11 RX ORDER — MIDAZOLAM HYDROCHLORIDE 1 MG/ML
INJECTION INTRAMUSCULAR; INTRAVENOUS AS NEEDED
Status: DISCONTINUED | OUTPATIENT
Start: 2023-12-11 | End: 2023-12-11 | Stop reason: SURG

## 2023-12-11 RX ORDER — FAMOTIDINE 20 MG/1
20 TABLET, FILM COATED ORAL 2 TIMES DAILY PRN
Status: DISCONTINUED | OUTPATIENT
Start: 2023-12-11 | End: 2023-12-12 | Stop reason: HOSPADM

## 2023-12-11 RX ORDER — ROSUVASTATIN CALCIUM 10 MG/1
20 TABLET, COATED ORAL NIGHTLY
Status: DISCONTINUED | OUTPATIENT
Start: 2023-12-11 | End: 2023-12-12 | Stop reason: HOSPADM

## 2023-12-11 RX ORDER — IPRATROPIUM BROMIDE AND ALBUTEROL SULFATE 2.5; .5 MG/3ML; MG/3ML
3 SOLUTION RESPIRATORY (INHALATION) ONCE AS NEEDED
Status: DISCONTINUED | OUTPATIENT
Start: 2023-12-11 | End: 2023-12-12 | Stop reason: HOSPADM

## 2023-12-11 RX ORDER — SODIUM CHLORIDE 9 MG/ML
INJECTION, SOLUTION INTRAVENOUS CONTINUOUS PRN
Status: DISCONTINUED | OUTPATIENT
Start: 2023-12-11 | End: 2023-12-11 | Stop reason: SURG

## 2023-12-11 RX ORDER — DIPHENHYDRAMINE HYDROCHLORIDE 50 MG/ML
12.5 INJECTION INTRAMUSCULAR; INTRAVENOUS
Status: DISCONTINUED | OUTPATIENT
Start: 2023-12-11 | End: 2023-12-12 | Stop reason: HOSPADM

## 2023-12-11 RX ORDER — MEPERIDINE HYDROCHLORIDE 25 MG/ML
12.5 INJECTION INTRAMUSCULAR; INTRAVENOUS; SUBCUTANEOUS
Status: DISCONTINUED | OUTPATIENT
Start: 2023-12-11 | End: 2023-12-12 | Stop reason: HOSPADM

## 2023-12-11 RX ORDER — PHENYLEPHRINE HCL IN 0.9% NACL 1 MG/10 ML
SYRINGE (ML) INTRAVENOUS AS NEEDED
Status: DISCONTINUED | OUTPATIENT
Start: 2023-12-11 | End: 2023-12-11 | Stop reason: SURG

## 2023-12-11 RX ORDER — PROPOFOL 10 MG/ML
INJECTION, EMULSION INTRAVENOUS CONTINUOUS PRN
Status: DISCONTINUED | OUTPATIENT
Start: 2023-12-11 | End: 2023-12-11 | Stop reason: SURG

## 2023-12-11 RX ORDER — LIDOCAINE HYDROCHLORIDE AND EPINEPHRINE BITARTRATE 20; .01 MG/ML; MG/ML
INJECTION, SOLUTION SUBCUTANEOUS
Status: DISCONTINUED | OUTPATIENT
Start: 2023-12-11 | End: 2023-12-11 | Stop reason: HOSPADM

## 2023-12-11 RX ORDER — EPHEDRINE SULFATE 5 MG/ML
5 INJECTION INTRAVENOUS ONCE AS NEEDED
Status: DISCONTINUED | OUTPATIENT
Start: 2023-12-11 | End: 2023-12-12 | Stop reason: HOSPADM

## 2023-12-11 RX ORDER — HYDRALAZINE HYDROCHLORIDE 20 MG/ML
5 INJECTION INTRAMUSCULAR; INTRAVENOUS
Status: DISCONTINUED | OUTPATIENT
Start: 2023-12-11 | End: 2023-12-12 | Stop reason: HOSPADM

## 2023-12-11 RX ORDER — LOSARTAN POTASSIUM 50 MG/1
50 TABLET ORAL DAILY
Status: DISCONTINUED | OUTPATIENT
Start: 2023-12-12 | End: 2023-12-12 | Stop reason: HOSPADM

## 2023-12-11 RX ORDER — LABETALOL HYDROCHLORIDE 5 MG/ML
5 INJECTION, SOLUTION INTRAVENOUS
Status: DISCONTINUED | OUTPATIENT
Start: 2023-12-11 | End: 2023-12-12 | Stop reason: HOSPADM

## 2023-12-11 RX ORDER — FENTANYL CITRATE 50 UG/ML
INJECTION, SOLUTION INTRAMUSCULAR; INTRAVENOUS AS NEEDED
Status: DISCONTINUED | OUTPATIENT
Start: 2023-12-11 | End: 2023-12-11 | Stop reason: SURG

## 2023-12-11 RX ORDER — ONDANSETRON 2 MG/ML
4 INJECTION INTRAMUSCULAR; INTRAVENOUS ONCE AS NEEDED
Status: DISCONTINUED | OUTPATIENT
Start: 2023-12-11 | End: 2023-12-12 | Stop reason: HOSPADM

## 2023-12-11 RX ORDER — MORPHINE SULFATE 1 MG/ML
INJECTION INTRAVENOUS CONTINUOUS PRN
Status: DISCONTINUED | OUTPATIENT
Start: 2023-12-11 | End: 2023-12-12 | Stop reason: HOSPADM

## 2023-12-11 RX ORDER — MORPHINE SULFATE 2 MG/ML
1 INJECTION, SOLUTION INTRAMUSCULAR; INTRAVENOUS EVERY 4 HOURS PRN
Status: DISCONTINUED | OUTPATIENT
Start: 2023-12-11 | End: 2023-12-12 | Stop reason: HOSPADM

## 2023-12-11 RX ADMIN — PROPOFOL INJECTABLE EMULSION 100 MCG/KG/MIN: 10 INJECTION, EMULSION INTRAVENOUS at 15:18

## 2023-12-11 RX ADMIN — Medication 100 MCG: at 16:43

## 2023-12-11 RX ADMIN — SODIUM CHLORIDE: 9 INJECTION, SOLUTION INTRAVENOUS at 15:13

## 2023-12-11 RX ADMIN — FENTANYL CITRATE 25 MCG: 50 INJECTION, SOLUTION INTRAMUSCULAR; INTRAVENOUS at 15:30

## 2023-12-11 RX ADMIN — SODIUM CHLORIDE 2000 MG: 900 INJECTION INTRAVENOUS at 15:16

## 2023-12-11 RX ADMIN — SODIUM CHLORIDE 2000 MG: 900 INJECTION INTRAVENOUS at 22:20

## 2023-12-11 RX ADMIN — FENTANYL CITRATE 25 MCG: 50 INJECTION, SOLUTION INTRAMUSCULAR; INTRAVENOUS at 15:37

## 2023-12-11 RX ADMIN — LIDOCAINE HYDROCHLORIDE 60 MG: 20 INJECTION, SOLUTION EPIDURAL; INFILTRATION; INTRACAUDAL; PERINEURAL at 15:18

## 2023-12-11 RX ADMIN — Medication 100 MCG: at 16:00

## 2023-12-11 RX ADMIN — MIDAZOLAM 2 MG: 1 INJECTION INTRAMUSCULAR; INTRAVENOUS at 15:16

## 2023-12-11 RX ADMIN — MEPERIDINE HYDROCHLORIDE 12.5 MG: 25 INJECTION INTRAMUSCULAR; INTRAVENOUS; SUBCUTANEOUS at 17:26

## 2023-12-11 RX ADMIN — Medication 100 MCG: at 16:15

## 2023-12-11 RX ADMIN — ROSUVASTATIN 20 MG: 10 TABLET, FILM COATED ORAL at 22:20

## 2023-12-11 NOTE — ANESTHESIA PREPROCEDURE EVALUATION
Anesthesia Evaluation     Patient summary reviewed and Nursing notes reviewed   no history of anesthetic complications:   NPO Solid Status: > 8 hours  NPO Liquid Status: > 2 hours           Airway   Dental      Pulmonary    (+) ,shortness of breath, sleep apnea  Cardiovascular     ECG reviewed    (+) pacemaker pacemaker, hypertension, valvular problems/murmurs TI and MR, CAD, dysrhythmias Bradycardia, Tachycardia, CHF , hyperlipidemia      Neuro/Psych  (+) headaches, dizziness/light headedness, syncope, numbness  GI/Hepatic/Renal/Endo    (+) hepatitis C, diabetes mellitus using insulin    Musculoskeletal     (+) chronic pain, neck pain, radiculopathy  Abdominal    Substance History      OB/GYN          Other   arthritis, blood dyscrasia anemia thrombocytopenia,   history of cancer    ROS/Med Hx Other: Additional History:  Breast cancer with bone mets, sick sinus syndrome, complete heart block, pulmonary hypertension, tetrology of fallot, allergies    Pain pump    Echo:    Echocardiogram Findings    Left Ventricle Left ventricular systolic function is normal. Left ventricular ejection fraction appears to be 56 - 60%.   Septal wall motion is abnormal, consistent with a bundle branch block. Diastolic flattening of the interventricular septum consistent with right ventricle volume overload. Normal left ventricular cavity size and wall thickness noted. Left ventricular diastolic function is consistent with (grade Ia w/high LAP) impaired relaxation.  Right Ventricle The right ventricular cavity is moderately dilated. Right ventricular wall thickness is consistent with severe hypertrophy. Severely reduced right ventricular systolic function noted. Electronic lead present in the ventricle.  Left Atrium Normal left atrial size and volume noted. The interatrial septum does not appear to be redundant. No interatrial septal aneurysm present. No lipomatous hypertrophy of the interatrial septum present. Cannot exclude the  presence of a patent foramen ovale. Saline test for shunting not performed.  Right Atrium The right atrial cavity is moderate to severely dilated.  The inferior vena cava is dilated. An electronic lead is present in the right atrium.  Aortic Valve The aortic valve is grossly normal in structure. The aortic valve appears trileaflet. No significant aortic valve regurgitation is present. No hemodynamically significant aortic valve stenosis is present.  Mitral Valve There is moderate, bileaflet mitral valve thickening present. Trace to mild mitral valve regurgitation is present with a posteriorly-directed jet noted. Appears mildly rheumatic  Tricuspid Valve The tricuspid valve is abnormal in structure. The tricuspid valve annulus appears severely dilated. Severe tricuspid valve regurgitation is present. Estimated right ventricular systolic pressure from tricuspid regurgitation is mildly elevated (35-45 mmHg). Mild pulmonary hypertension is present. No evidence of significant tricuspid valve stenosis is present.  Pulmonic Valve The pulmonic valve is not well visualized. There is no pulmonic valve stenosis present.  Greater Vessels No dilation of the aortic root is present. No dilation of the sinuses of Valsalva is present.  Pericardium There is a trivial pericardial effusion. The effusion is fluid filled. There is no evidence of cardiac tamponade. The respiratory variation of mitral valve inflow is less than 30%. The respiratory variation of tricuspid valve inflow is less than 30%.        Stress Test:  · Findings consistent with an indeterminate ECG stress test.  · Left ventricular ejection fraction is normal (Calculated EF = 56%).  · Impressions are consistent with a low risk study.  · Nuclear images reviewed revealing prominent apical slit. Negative for ischemia.    PSH:  CARDIAC SURGERY  SECTION  NECK SURGERY KNEE SURGERY  CARDIAC ABLATION PACEMAKER IMPLANTATION  MASTECTOMY BREAST RECONSTRUCTION  CARDIAC  SURGERY BACK SURGERY  CERVICAL FUSION ANTERIOR WITH ARTIFICIAL DISCECTOMY IMPLANTATION CARDIAC CATHETERIZATION  ENDOSCOPY COLONOSCOPY  ENDOMETRIAL ABLATION PAIN PUMP INSERTION/REVISION        Phys Exam Other: Cervical fusion            Anesthesia Plan    ASA 4     general     (Patient identified; pre-operative vital signs, all relevant labs/studies, complete medical/surgical/anesthetic history, full medication list, full allergy list, and NPO status obtained/reviewed; physical assessment performed; anesthetic options, side effects, potential complications, risks, and benefits discussed; questions answered; written anesthesia consent obtained; patient cleared for procedure; anesthesia machine and equipment checked and functioning)  intravenous induction     Anesthetic plan, risks, benefits, and alternatives have been provided, discussed and informed consent has been obtained with: patient.    Plan discussed with CRNA and CAA.    CODE STATUS:

## 2023-12-11 NOTE — PLAN OF CARE
Goal Outcome Evaluation:  Plan of Care Reviewed With: patient        Progress: no change     Pt is post pacer revision. Pt is on room air. Pt is alert and oriented. Pt has a left sling on arm. Pt is currently on bedrest and attempting to rest.       Problem: Adult Inpatient Plan of Care  Goal: Plan of Care Review  Outcome: Ongoing, Progressing  Flowsheets (Taken 12/11/2023 1808)  Progress: no change  Plan of Care Reviewed With: patient  Goal: Patient-Specific Goal (Individualized)  Outcome: Ongoing, Progressing  Goal: Absence of Hospital-Acquired Illness or Injury  Outcome: Ongoing, Progressing  Intervention: Identify and Manage Fall Risk  Recent Flowsheet Documentation  Taken 12/11/2023 1757 by Kathy Morales RN  Safety Promotion/Fall Prevention:   activity supervised   assistive device/personal items within reach   clutter free environment maintained   fall prevention program maintained   nonskid shoes/slippers when out of bed   room organization consistent   safety round/check completed  Intervention: Prevent Skin Injury  Recent Flowsheet Documentation  Taken 12/11/2023 1757 by Kathy Morales RN  Body Position: position changed independently  Skin Protection: adhesive use limited  Intervention: Prevent and Manage VTE (Venous Thromboembolism) Risk  Recent Flowsheet Documentation  Taken 12/11/2023 1757 by Kathy Morales RN  Activity Management: activity minimized  VTE Prevention/Management: patient refused intervention  Range of Motion: active ROM (range of motion) encouraged  Intervention: Prevent Infection  Recent Flowsheet Documentation  Taken 12/11/2023 1757 by Kathy Morales RN  Infection Prevention:   hand hygiene promoted   personal protective equipment utilized   rest/sleep promoted   single patient room provided   environmental surveillance performed  Goal: Optimal Comfort and Wellbeing  Outcome: Ongoing, Progressing  Intervention: Provide Person-Centered Care  Recent Flowsheet Documentation  Taken  12/11/2023 1757 by Kathy Morales, RN  Trust Relationship/Rapport:   care explained   choices provided  Goal: Readiness for Transition of Care  Outcome: Ongoing, Progressing  Intervention: Mutually Develop Transition Plan  Recent Flowsheet Documentation  Taken 12/11/2023 1755 by Kathy Morales, RN  Equipment Currently Used at Home: none  Transportation Anticipated: family or friend will provide  Patient/Family Anticipated Services at Transition: none  Patient/Family Anticipates Transition to: home with family

## 2023-12-12 VITALS
RESPIRATION RATE: 16 BRPM | BODY MASS INDEX: 26.92 KG/M2 | HEART RATE: 60 BPM | SYSTOLIC BLOOD PRESSURE: 172 MMHG | OXYGEN SATURATION: 99 % | DIASTOLIC BLOOD PRESSURE: 91 MMHG | HEIGHT: 60 IN | TEMPERATURE: 97.8 F | WEIGHT: 137.13 LBS

## 2023-12-12 PROCEDURE — 25010000002 CEFAZOLIN PER 500 MG: Performed by: INTERNAL MEDICINE

## 2023-12-12 PROCEDURE — 63710000001 INSULIN ISOPHANE & REGULAR PER 5 UNITS: Performed by: INTERNAL MEDICINE

## 2023-12-12 PROCEDURE — A9270 NON-COVERED ITEM OR SERVICE: HCPCS | Performed by: INTERNAL MEDICINE

## 2023-12-12 PROCEDURE — 63710000001 LOSARTAN 50 MG TABLET: Performed by: INTERNAL MEDICINE

## 2023-12-12 PROCEDURE — 99024 POSTOP FOLLOW-UP VISIT: CPT | Performed by: INTERNAL MEDICINE

## 2023-12-12 PROCEDURE — 93005 ELECTROCARDIOGRAM TRACING: CPT | Performed by: INTERNAL MEDICINE

## 2023-12-12 RX ORDER — CEPHALEXIN 500 MG/1
500 CAPSULE ORAL 2 TIMES DAILY
Qty: 10 CAPSULE | Refills: 0 | Status: SHIPPED | OUTPATIENT
Start: 2023-12-12 | End: 2023-12-17

## 2023-12-12 RX ORDER — HYDROCODONE BITARTRATE AND ACETAMINOPHEN 7.5; 325 MG/1; MG/1
1 TABLET ORAL EVERY 6 HOURS PRN
Qty: 20 TABLET | Refills: 0 | Status: SHIPPED | OUTPATIENT
Start: 2023-12-12

## 2023-12-12 RX ADMIN — LOSARTAN POTASSIUM 50 MG: 50 TABLET, FILM COATED ORAL at 08:25

## 2023-12-12 RX ADMIN — INSULIN HUMAN 10 UNITS: 100 INJECTION, SUSPENSION SUBCUTANEOUS at 06:09

## 2023-12-12 RX ADMIN — SODIUM CHLORIDE 2000 MG: 900 INJECTION INTRAVENOUS at 06:09

## 2023-12-12 NOTE — PLAN OF CARE
Goal Outcome Evaluation:      A&Ox4 and on room air. Site on left upper chest bled intermittently throughout night and remains soft. Dressing is intact, dry, and has old drainage. Marking drainage as it occurs. Patient reports no tingling numbness in L upper extremity and L radial pulse +2 throughout shift. Patient c/o 8/10 pain and obtained order for dilaudid as needed but patient refused related to feeling nauseous when nurse flushed IV.

## 2023-12-12 NOTE — CASE MANAGEMENT/SOCIAL WORK
Case Management Discharge Note      Final Note: DC home prior to CM assessment.         Selected Continued Care - Discharged on 12/12/2023 Admission date: 12/11/2023 - Discharge disposition: Home or Self Care          Transportation Services  Private: Car    Final Discharge Disposition Code: 01 - home or self-care

## 2023-12-12 NOTE — OUTREACH NOTE
Medical Week 2 Survey      Flowsheet Row Responses   [facility] facility patient discharged from? [facility]   Does the patient have one of the following disease processes/diagnoses(primary or secondary)? Other   Week 2 attempt successful? No   Unsuccessful attempts Attempt 1   Revoke Readmitted            MCKENZIE QUICK - Registered Nurse

## 2023-12-12 NOTE — DISCHARGE SUMMARY
[facility]    Date of Admission: 12/11/2023    Date of Discharge:  12/12/2023    Length of stay:  LOS: 0 days     Admission Diagnosis: Complete heart block, pacemaker battery close to TREY, concern for RV lead malfunction    Discharge Diagnosis: Same, status post CRT-P upgrade with addition of a left bundle area pacing lead and generator change out.    Presenting Problem/History of Present Illness  Active Hospital Problems    Diagnosis  POA    **Complete heart block [I44.2]  Unknown      Resolved Hospital Problems   No resolved problems to display.          Hospital Course  Gladys Garrison is a 63 y.o. female presented for elective upgrade of her dual-chamber pacemaker system to a CRT-P.  Procedure was done on 12/11/2023, no procedural complications, patient was seen and examined this morning, no bleeding or hematoma at surgical incision site, chest x-ray showed good lead positioning, device interrogation showed appropriate function.  She will be discharged home in a stable condition, 5 days of antibiotics added and Lortab as well.  Will see renal office for follow-up for wound check and device check in about 2 weeks.      The discharge process took about 35 minutes during which we discussed about pacemaker monitoring, wound care, medications as well as arm restrictions.  The patient voiced understanding and all her  questions were answered to her  satisfaction.    Past Medical History:   Diagnosis Date    Allergic     Bone cancer     METASTATIC BONE    Bradycardia     SECONDARY TO ABLATION    Breast cancer 2017    mets to lymph nodes; did not do radiation    Cancer of unknown origin     Compression fx, thoracic spine, open, initial encounter     Coronary artery disease     COVID-19 09/01/2021    Diabetes mellitus     Heart disease, unspecified     Hepatitis C     RESOLVED WITH MEDICATION    Hyperlipidemia     Hypertension     Obesity (BMI 30-39.9) 02/05/2021    Rib fracture     Per patient    Sleep apnea     no  machine    Tachycardia     ATRIAL    Type 2 diabetes mellitus 2017     Past Surgical History:   Procedure Laterality Date    BACK SURGERY      neck X 2    BREAST RECONSTRUCTION Bilateral     CARDIAC ABLATION       atrial tachycardia x 5 ablations     CARDIAC CATHETERIZATION      CARDIAC SURGERY      stent placed in aorta    CARDIAC SURGERY      6 surgeries as baby     CERVICAL FUSION ANTERIOR WITH ARTIFICIAL DISCECTOMY IMPLANTATION N/A 2020    Procedure: C4 VERTEBRECTOMY AND ANTERIOR CERIVCAL DISCECTOMY WITH FUSION OF CERVICAL THREE THROUGH FIVE WITH REMOVAL OF HARDWARE C5-C6;  Surgeon: Mark Kaba MD;  Location: [facility] MAIN OR;  Service: Neurosurgery;  Laterality: N/A;     SECTION      x2    COLONOSCOPY N/A 10/23/2020    Procedure: COLONOSCOPY WITH POLYPECTOMY X6;  Surgeon: Stanley Bourne MD;  Location: [facility] ENDOSCOPY;  Service: Gastroenterology;  Laterality: N/A;  POLYPS, INTERNAL HEMORRHOIDS    ENDOMETRIAL ABLATION      REMOVAL SCAR TISSUE UTERINE    ENDOSCOPY N/A 10/23/2020    Procedure: ESOPHAGOGASTRODUODENOSCOPY WITH BIOPSY X 1 AREA;  Surgeon: Stanley Bourne MD;  Location: [facility] ENDOSCOPY;  Service: Gastroenterology;  Laterality: N/A;  GASTRITIS, ESOPHAGITIS, HIATAL HERNIA    KNEE SURGERY  2000    MASTECTOMY Bilateral     NECK SURGERY      PACEMAKER IMPLANTATION      PAIN PUMP INSERTION/REVISION N/A 2022    Procedure: PAIN PUMP INSERTION AND INTRATHECAL CATHETER PLACEMENT;  Surgeon: Chuck Hunter MD;  Location: [facility] MAIN OR;  Service: Pain Management;  Laterality: N/A;     Social History     Socioeconomic History    Marital status: Legally     Number of children: 2   Tobacco Use    Smoking status: Never     Passive exposure: Never    Smokeless tobacco: Never   Vaping Use    Vaping Use: Never used   Substance and Sexual Activity    Alcohol use: Not Currently     Comment: drinks rarely    Drug use: Never    Sexual activity: Defer  "      Procedures Performed: CRT-P upgrade, see report for details       Pertinent Test Results:     Lab Results (last 72 hours)       Procedure Component Value Units Date/Time    POC Glucose Once [[id number]]  (Normal) Collected: 12/11/23 1353    Specimen: Blood Updated: 12/11/23 1354     Glucose 71 mg/dL      Comment: Serial Number: [id number]:  [id number]               Condition on Discharge: Stable    Vital Signs  /91 (BP Location: Right arm, Patient Position: Lying)   Pulse 60   Temp 97.8 °F (36.6 °C) (Oral)   Resp 16   Ht 152.4 cm (60\")   Wt 62.2 kg (137 lb 2 oz)   LMP  (LMP Unknown)   SpO2 99%   BMI 26.78 kg/m²     Physical Exam    Physical Exam  VITALS REVIEWED    General:      well developed, in no acute distress.    Head:      normocephalic and atraumatic.    Eyes:      PERRL/EOM intact, conjunctiva and sclera clear with out nystagmus.    Neck:      no masses, thyromegaly,  trachea central with normal respiratory effort and PMI displaced laterally  Lungs:      Clear  Heart:       Regular rate and rhythm  Msk:      no deformity or scoliosis noted of thoracic or lumbar spine.    Pulses:      pulses normal in all 4 extremities.    Extremities:       No lower extremity edema  Neurologic:      no focal deficits.   alert oriented x3  Skin:      intact without lesions or rashes.    Psych:      alert and cooperative; normal mood and affect; normal attention span and concentration.      Discharge Disposition  Home or Self Care    Discharge Medications     Discharge Medications        New Medications        Instructions Start Date   cephalexin 500 MG capsule  Commonly known as: Keflex   500 mg, Oral, 2 Times Daily      HYDROcodone-acetaminophen 7.5-325 MG per tablet  Commonly known as: NORCO   1 tablet, Oral, Every 6 Hours PRN             Continue These Medications        Instructions Start Date   Accu-Chek Araceli device   Use as instructed   To test blood sugar bid      B-D UF III MINI PEN NEEDLES " "31G X 5 MM misc  Generic drug: Insulin Pen Needle   Use to inject insulin twice daily   DX: E11.9      Calcium 600+D Plus Minerals 600-400 MG-UNIT chewable tablet   600 mg, Oral, 2 Times Daily      exemestane 25 MG tablet  Commonly known as: AROMASIN   25 mg, Oral, Daily      famotidine 20 MG tablet  Commonly known as: PEPCID   20 mg, Oral, 2 Times Daily PRN      FreeStyle Rajeev 14 Day [place] device   1 each, Does not apply, Daily      FreeStyle Rajeev 14 Day Sensor misc   1 each, Does not apply, Daily      insulin NPH-insulin regular (70-30) 100 UNIT/ML injection  Commonly known as: humuLIN 70/30,novoLIN 70/30   10-15 Units, Subcutaneous, Every Morning      insulin NPH-insulin regular (70-30) 100 UNIT/ML injection  Commonly known as: humuLIN 70/30,novoLIN 70/30   5 Units, Subcutaneous, Every Evening, If BS over 180      Insulin Syringe-Needle U-100 28G X 1/2\" 1 ML misc   1 each, Does not apply, 2 Times Daily      losartan 50 MG tablet  Commonly known as: Cozaar   50 mg, Oral, Daily, Discontinue the lisinopril due to interaction with new chemotherapy      Morphine Sulfate (PF) 8 MG/ML solution   8 mg, Intrathecal Infusion, Daily, Receives 8 mg through pain pump q 24 hrs      rosuvastatin 20 MG tablet  Commonly known as: Crestor   20 mg, Oral, Nightly               Discharge Diet: Cardiac    Activity at Discharge: Left arm restrictions were given to the patient.    Follow-up Appointments  Future Appointments   Date Time Provider Department Center   12/18/2023  1:30 PM Мария Nunez MD [facility]   12/18/2023  1:30 PM LAB MD [facility] LAB NA [facility]   1/2/2024  2:45 PM Chirag Robles DO [facility]   1/8/2024  2:00 PM Kourtney Flores APRN [facility] NA P [facility]   1/8/2024  2:00 PM [facility]ARD [facility] DEVICE CHECK [facility] NA P [facility]   3/19/2024  1:30 PM Steven Hammond MD [facility]   4/15/2024  1:00 Stanley Fuller MD [facility] NA P [facility]       Risk for " Readmission (LACE) Score: 11 (12/12/2023  6:00 AM)      Steven Hammond MD  12/12/23  09:12 EST

## 2023-12-13 ENCOUNTER — TELEPHONE (OUTPATIENT)
Dept: ONCOLOGY | Facility: CLINIC | Age: 63
End: 2023-12-13
Payer: MEDICARE

## 2023-12-13 NOTE — TELEPHONE ENCOUNTER
OSW received message that [facility].  OSW called patient to determine need(s).  Patient stated that she had surgery and although the doctor has cleared her to return to work, her employer will not let her return until she is no longer wearing a brace/sling on her arm.  Patient has been able to pay all bills for the month; however, does not have enough money for groceries.  OSW assured patient that she would receive a call back as soon as OSW could compile some resources.  OSW made some calls and compiled a list of resources for patient's food needs.  OSW called patient back and confirmed that it was okay to email the information to her.  OSW sent two documents, one [facility] specific and the other for other [facility].  Patient was encouraged to call back with other needs.  Patient has appointments on 12/18 and OSW can be available to assist further If needed at that time.  No other needs identified at this time.

## 2023-12-14 ENCOUNTER — TELEPHONE (OUTPATIENT)
Dept: CARDIOLOGY | Facility: CLINIC | Age: 63
End: 2023-12-14
Payer: MEDICARE

## 2023-12-14 NOTE — TELEPHONE ENCOUNTER
Caller: Gladys Garrison    Relationship to patient: Self    Best call back number: [phone]    Patient is needing: PT NEEDING A WORK NOTE UPLOADED TO [employer]. SAYS WE GAVE HER ONE FOR 12/12-12/19 BUT SHE LOST IT. PLEASE PROVIDE ONE VIA MY CHART.     ALSO WANTS TO DISCUSS HOW LONG SHE NEEDS TO HAVE HER BRACE ON.

## 2023-12-15 LAB
QT INTERVAL: 479 MS
QTC INTERVAL: 479 MS

## 2023-12-15 NOTE — H&P
History of present illness  Gladys Garrison is a 63-year-old female patient who has history of hypertension, dyslipidemia, pulm hypertension, pathology of Fallot, surgically repaired, patient has had up to 8 open heart surgeries in her youth, she does have complete heart block and has a dual-chamber pacemaker initially implanted in .  She has a breast cancer which is stage IV and metastatic to the spine.     Patient was seen in the ER earlier due to concerns for device malfunction with inhibition of pacing on 2023.     Medical History        Past Medical History:   Diagnosis Date    Allergic      Bone cancer       METASTATIC BONE    Bradycardia       SECONDARY TO ABLATION    Breast cancer      mets to lymph nodes; did not do radiation    Cancer of unknown origin      Compression fx, thoracic spine, open, initial encounter      Coronary artery disease      COVID-19 2021    Diabetes mellitus      Heart disease, unspecified      Hepatitis C       RESOLVED WITH MEDICATION    Hyperlipidemia      Hypertension      Obesity (BMI 30-39.9) 2021    Rib fracture       Per patient    Sleep apnea       no machine    Tachycardia       ATRIAL    Type 2 diabetes mellitus 2017         Surgical History         Past Surgical History:   Procedure Laterality Date    BACK SURGERY         neck X 2    BREAST RECONSTRUCTION Bilateral      CARDIAC ABLATION          atrial tachycardia x 5 ablations     CARDIAC CATHETERIZATION        CARDIAC SURGERY         stent placed in aorta    CARDIAC SURGERY         6 surgeries as baby     CERVICAL FUSION ANTERIOR WITH ARTIFICIAL DISCECTOMY IMPLANTATION N/A 2020     Procedure: C4 VERTEBRECTOMY AND ANTERIOR CERIVCAL DISCECTOMY WITH FUSION OF CERVICAL THREE THROUGH FIVE WITH REMOVAL OF HARDWARE C5-C6;  Surgeon: Mark Kaba MD;  Location: [facility] MAIN OR;  Service: Neurosurgery;  Laterality: N/A;     SECTION         x2    COLONOSCOPY N/A 10/23/2020      Procedure: COLONOSCOPY WITH POLYPECTOMY X6;  Surgeon: Stanley Bourne MD;  Location: [facility] ENDOSCOPY;  Service: Gastroenterology;  Laterality: N/A;  POLYPS, INTERNAL HEMORRHOIDS    ENDOMETRIAL ABLATION         REMOVAL SCAR TISSUE UTERINE    ENDOSCOPY N/A 10/23/2020     Procedure: ESOPHAGOGASTRODUODENOSCOPY WITH BIOPSY X 1 AREA;  Surgeon: Stanley Bourne MD;  Location: [facility] ENDOSCOPY;  Service: Gastroenterology;  Laterality: N/A;  GASTRITIS, ESOPHAGITIS, HIATAL HERNIA    KNEE SURGERY   2000    MASTECTOMY Bilateral      NECK SURGERY        PACEMAKER IMPLANTATION        PAIN PUMP INSERTION/REVISION N/A 4/5/2022     Procedure: PAIN PUMP INSERTION AND INTRATHECAL CATHETER PLACEMENT;  Surgeon: Chuck Hunter MD;  Location: [facility] MAIN OR;  Service: Pain Management;  Laterality: N/A;               Family History   Problem Relation Age of Onset    Heart disease Mother      Stroke Mother      Lung cancer Mother      Aneurysm Father      Diabetes Sister      No Known Problems Brother      No Known Problems Brother      Diabetes type I Half-Sister      Thyroid cancer Half-Sister      Cancer Maternal Aunt      Heart attack Sister      Thyroid disease Sister      No Known Problems Sister        Social History            Tobacco Use    Smoking status: Never       Passive exposure: Never    Smokeless tobacco: Never   Vaping Use    Vaping Use: Never used   Substance Use Topics    Alcohol use: Yes       Alcohol/week: 1.0 standard drink of alcohol       Types: 1 Glasses of wine per week       Comment: mixed drink once a week    Drug use: Not Currently         Current Outpatient Medications:     abemaciclib (VERZENIO) 200 mg tablet chemo tablet, Take 1 tablet by mouth 2 (Two) Times a Day., Disp: , Rfl:     aspirin 81 MG chewable tablet, Chew 1 tablet Daily., Disp: 30 tablet, Rfl: 1    Blood Glucose Monitoring Suppl (Accu-Chek Araceli) device, Use as instructed   To test blood sugar bid, Disp: 1 each, Rfl:  "0    Calcium Carbonate-Vit D-Min (Calcium 600+D Plus Minerals) 600-400 MG-UNIT chewable tablet, Chew 600 mg 2 (Two) Times a Day., Disp: 60 each, Rfl: 6    Continuous Blood Gluc  (FreeStyle Rajeev 14 Day [place]) device, 1 each Daily., Disp: 1 each, Rfl: 0    Continuous Blood Gluc Sensor (FreeStyle Rajeev 14 Day Sensor) misc, 1 each Daily., Disp: 6 each, Rfl: 3    exemestane (AROMASIN) 25 MG tablet, Take 1 tablet by mouth Daily., Disp: , Rfl:     famotidine (PEPCID) 20 MG tablet, Take 1 tablet by mouth 2 (Two) Times a Day As Needed for Heartburn., Disp: , Rfl:     fluticasone (FLONASE) 50 MCG/ACT nasal spray, 2 sprays into the nostril(s) as directed by provider Daily As Needed for Rhinitis., Disp: , Rfl:     ibuprofen (ADVIL,MOTRIN) 800 MG tablet, Take 1 tablet by mouth Every 8 (Eight) Hours As Needed for Mild Pain. IN BETWEEN PAIN  MEDS, Disp: 90 tablet, Rfl: 1    insulin NPH-insulin regular (humuLIN 70/30,novoLIN 70/30) (70-30) 100 UNIT/ML injection, Inject 10 Units under the skin into the appropriate area as directed Every 12 (Twelve) Hours., Disp: , Rfl:     Insulin Pen Needle (B-D UF III MINI PEN NEEDLES) 31G X 5 MM misc, Use to inject insulin twice daily   DX: E11.9, Disp: 100 each, Rfl: 0    Insulin Syringe-Needle U-100 28G X 1/2\" 1 ML misc, 1 each 2 (Two) Times a Day., Disp: 100 each, Rfl: 6    losartan (Cozaar) 50 MG tablet, Take 1 tablet by mouth Daily. Discontinue the lisinopril due to interaction with new chemotherapy, Disp: 90 tablet, Rfl: 1    methocarbamol (ROBAXIN) 750 MG tablet, Take 1 tablet by mouth 3 (Three) Times a Day As Needed for Muscle Spasms., Disp: 15 tablet, Rfl: 0    ondansetron ODT (ZOFRAN-ODT) 4 MG disintegrating tablet, Place 1 tablet on the tongue Every 8 (Eight) Hours As Needed for Nausea or Vomiting., Disp: 20 tablet, Rfl: 2    rosuvastatin (Crestor) 20 MG tablet, Take 1 tablet by mouth Every Night., Disp: 90 tablet, Rfl: 0  Allergies:  Promethazine and Tape        Physical " Exam  VITALS REVIEWED     General:      well developed, in no acute distress.    Head:      normocephalic and atraumatic.    Eyes:      PERRL/EOM intact, conjunctiva and sclera clear with out nystagmus.    Neck:      no masses, thyromegaly,  trachea central with normal respiratory effort and PMI displaced laterally  Lungs:      Clear to auscultation bilaterally  Heart:       Regular rate and rhythm  Msk:      no deformity or scoliosis noted of thoracic or lumbar spine.    Pulses:      pulses normal in all 4 extremities.    Extremities:       No lower extremity edema  Neurologic:      no focal deficits.   alert oriented x3  Skin:      intact without lesions or rashes.    Psych:      alert and cooperative; normal mood and affect; normal attention span and concentration.             Result Review   :                    Pertinent cardiac workup     EKG 11/6/2023 a-V paced rhythm  Echocardiogram 3/10/2023 ejection fraction 55 to 60% severely reduced RV systolic function severe tricuspid regurgitation.        Procedures            Assessment   Assessment and Plan       Gladys Garrisno is a 63-year-old female patient who was tetralogy of Fallot and has had multiple surgeries in her youth.  She has complete heart block and has a dual-chamber pacemaker originally implanted in 2007.  Patient also has breast cancer which is metastatic to the spine.     Patient was seen in the ER on November 6, 2023, at that time there was a strip from the device where it showed lack of capture with oversensing on the RV channel.  It was however determined that it was due to unipolar sensing configuration and therefore the RV lead was programmed bipolar both sensing and pacing.  Today device interrogation was performed the threshold on the RV lead was less than 1.  No R waves sensed as she is in complete heart block, no noise either.  No inhibition of pacing.  Sensing on the atrial channel was very low, possibly due to a triopathy.  Battery  life is about 8 months to TREY.  I think at this time best course of action is to wait and proceed with generator change out whenever it is time.  I am not entirely sure BiV upgrade would be a good idea in her case due to her metastatic disease.  Patient does not seem to be in CHF.  This however can be certainly reconsidered when it is time for a generator change out.     I will bring her back for follow-up in 4 months.     Diagnoses and all orders for this visit:     1. AV block, complete (Primary)     2. Pacemaker     3. Primary hypertension        Addendum    Patient is here today for gen change and addition of new RV lead

## 2023-12-15 NOTE — TELEPHONE ENCOUNTER
Letter sent via [employer]. Patient only needs to wear brace if needed as a reminder to keep arm below shoulder.

## 2023-12-18 ENCOUNTER — LAB (OUTPATIENT)
Dept: LAB | Facility: HOSPITAL | Age: 63
End: 2023-12-18
Payer: MEDICARE

## 2023-12-18 ENCOUNTER — OFFICE VISIT (OUTPATIENT)
Dept: ONCOLOGY | Facility: CLINIC | Age: 63
End: 2023-12-18
Payer: MEDICARE

## 2023-12-18 VITALS
OXYGEN SATURATION: 91 % | TEMPERATURE: 98 F | HEART RATE: 60 BPM | SYSTOLIC BLOOD PRESSURE: 135 MMHG | WEIGHT: 134 LBS | DIASTOLIC BLOOD PRESSURE: 79 MMHG | BODY MASS INDEX: 26.31 KG/M2 | HEIGHT: 60 IN | RESPIRATION RATE: 18 BRPM

## 2023-12-18 DIAGNOSIS — C50.919 MALIGNANT NEOPLASM OF FEMALE BREAST, UNSPECIFIED ESTROGEN RECEPTOR STATUS, UNSPECIFIED LATERALITY, UNSPECIFIED SITE OF BREAST: Primary | ICD-10-CM

## 2023-12-18 DIAGNOSIS — C79.51 MALIGNANT NEOPLASM METASTATIC TO BONE: ICD-10-CM

## 2023-12-18 LAB
ALBUMIN SERPL-MCNC: 4.2 G/DL (ref 3.5–5.2)
ALBUMIN/GLOB SERPL: 1.3 G/DL
ALP SERPL-CCNC: 77 U/L (ref 39–117)
ALT SERPL W P-5'-P-CCNC: 16 U/L (ref 1–33)
ANION GAP SERPL CALCULATED.3IONS-SCNC: 12 MMOL/L (ref 5–15)
AST SERPL-CCNC: 31 U/L (ref 1–32)
BASOPHILS # BLD AUTO: 0.01 10*3/MM3 (ref 0–0.2)
BASOPHILS NFR BLD AUTO: 0.2 % (ref 0–1.5)
BILIRUB SERPL-MCNC: 0.5 MG/DL (ref 0–1.2)
BUN SERPL-MCNC: 11 MG/DL (ref 8–23)
BUN/CREAT SERPL: 12.6 (ref 7–25)
CALCIUM SPEC-SCNC: 9.6 MG/DL (ref 8.6–10.5)
CHLORIDE SERPL-SCNC: 103 MMOL/L (ref 98–107)
CO2 SERPL-SCNC: 25 MMOL/L (ref 22–29)
CREAT SERPL-MCNC: 0.87 MG/DL (ref 0.57–1)
DEPRECATED RDW RBC AUTO: 46.3 FL (ref 37–54)
EGFRCR SERPLBLD CKD-EPI 2021: 75.9 ML/MIN/1.73
EOSINOPHIL # BLD AUTO: 0.07 10*3/MM3 (ref 0–0.4)
EOSINOPHIL NFR BLD AUTO: 1.6 % (ref 0.3–6.2)
ERYTHROCYTE [DISTWIDTH] IN BLOOD BY AUTOMATED COUNT: 14.2 % (ref 12.3–15.4)
GLOBULIN UR ELPH-MCNC: 3.3 GM/DL
GLUCOSE SERPL-MCNC: 88 MG/DL (ref 65–99)
HCT VFR BLD AUTO: 37.4 % (ref 34–46.6)
HGB BLD-MCNC: 11.7 G/DL (ref 12–15.9)
HOLD SPECIMEN: NORMAL
HOLD SPECIMEN: NORMAL
LYMPHOCYTES # BLD AUTO: 0.41 10*3/MM3 (ref 0.7–3.1)
LYMPHOCYTES NFR BLD AUTO: 9.6 % (ref 19.6–45.3)
MCH RBC QN AUTO: 28.8 PG (ref 26.6–33)
MCHC RBC AUTO-ENTMCNC: 31.3 G/DL (ref 31.5–35.7)
MCV RBC AUTO: 92.1 FL (ref 79–97)
MONOCYTES # BLD AUTO: 0.25 10*3/MM3 (ref 0.1–0.9)
MONOCYTES NFR BLD AUTO: 5.9 % (ref 5–12)
NEUTROPHILS NFR BLD AUTO: 3.53 10*3/MM3 (ref 1.7–7)
NEUTROPHILS NFR BLD AUTO: 82.7 % (ref 42.7–76)
PLATELET # BLD AUTO: 126 10*3/MM3 (ref 140–450)
PMV BLD AUTO: 9.9 FL (ref 6–12)
POTASSIUM SERPL-SCNC: 4.4 MMOL/L (ref 3.5–5.2)
PROT SERPL-MCNC: 7.5 G/DL (ref 6–8.5)
RBC # BLD AUTO: 4.06 10*6/MM3 (ref 3.77–5.28)
SODIUM SERPL-SCNC: 140 MMOL/L (ref 136–145)
WBC NRBC COR # BLD AUTO: 4.27 10*3/MM3 (ref 3.4–10.8)

## 2023-12-18 PROCEDURE — 80053 COMPREHEN METABOLIC PANEL: CPT | Performed by: INTERNAL MEDICINE

## 2023-12-18 PROCEDURE — 85025 COMPLETE CBC W/AUTO DIFF WBC: CPT

## 2023-12-18 PROCEDURE — 36415 COLL VENOUS BLD VENIPUNCTURE: CPT

## 2023-12-18 NOTE — PROGRESS NOTES
Hematology/Oncology Outpatient Follow Up    PATIENT NAME:Gladys Garrison  :1960  MRN: [id number]  PRIMARY CARE PHYSICIAN: Chirag Robles DO  REFERRING PHYSICIAN: No ref. provider found      Chief Complaint   Patient presents with    Follow-up     Malignant neoplasm of female breast, unspecified estrogen receptor status, unspecified laterality, unspecified site of breast        HISTORY OF PRESENT ILLNESS:     This is a 63-year-old female who multiple comorbidities including congenital abnormalities such as hypoplastic kidney, tetralogy of Fallot, coarctation of the aorta and congenital VSD status post repair.  Patient developed syncopal episode and for that reason she had she had a CT scan of the chest which showed mass in the left breast.  She had diagnostic mammogram and ultrasound which showed a 2 cm spiculated mass in the left breast at the 1 o'clock position 6 cm from the nipple.  Biopsy of the left breast mass revealed invasive moderately differentiated carcinoma ER/CA positive and HER-2/bishop negative.  Patient also had an ultrasound of the axilla which was concerning for an abnormal left axillary lymph node with cortical thickening. She apparently had an FNA of the left axillary nodule which was positive for malignancy.  On 2017 patient underwent left modified radical mastectomy, right prophylactic mastectomy and immediate breast reconstruction. She also underwent right prophylactic mastectomy.  We have had her on records suggest that patient did have multifocal disease with pT2 pN1 aM0.  The largest focus measured 3.5 cm.  2 of 11 lymph nodes were positive for metastatic disease some with extracapsular extension.  Notes that patient did receive Arimidex neoadjuvant  from 2017 to 2018 prior to her bilateral mastectomies.    Review of her note suggest that she developed cough which she attributed to anastrozole and patient was then placed on tamoxifen.  She had MammaPrint  testing which returned with low risk for relapse.    Her postop course was also complicated by left chest wall abscess which resulted in I&D and removal of the left tissue expander. She was referred for radiation treatment boards ultimately declined.    Patient was then placed on tamoxifen in April 2018 which she stopped after less than a month due to nausea and vomiting.  Patient in the interim also was diagnosed with chronic hepatitis C was seen by the hepatologist.  Tamoxifen was dose reduced to 10 mg daily with the goal to increase to 20 mg daily.      Patient has relocated to [facility].  She has transitioned her care to us now.  She is currently not on any antiestrogen therapy.     Her bilateral mastectomy specimen are available for review    1/29/2021 patient had bone density which showed osteoporosis  Patient was seen by neurosurgery and had a bone scan done in March 2021 which showed subtle activity in the inferior L4 vertebral body appears to correlate with new area of sclerosis on CT of the abdomen and pelvis.  There is also subtle activity with possible new lesion at the posterior left sacrum.  These findings were concerning for metastatic disease.  PET CT scan was recommended to further evaluate.  4/8/2021 patient had a PET CT scan which showed evidence of disease in the neck chest abdomen and pelvis.  But she has a 1.1 cm mixed lytic/sclerotic hypermetabolic lesion within the left hemisacrum consistent with metastatic disease.  There is also accumulation within the inferior endplate of the L4 vertebral body thought to correspond to 1.2 centimeters sclerotic lesion consistent with metastatic disease.  There is a tiny hypermetabolic focus within the L3, T11.  Suspicious for also early metastatic disease.  4/26/2021 patient underwent CT-guided biopsy of sacral lesion, pathology was consistent with metastatic breast cancer ER and [place] positive HER-2/bishop was negative.  7/6/21 CT new sclerosis within the  right 12th rib posteriorly which could represent metastatic lesion or a healed or healing fracture, new sclerosis within the right 12th rib posteriorly which         could represent metastatic lesion,new sclerosis within the right T8 transverse process worrisome   for metastatic        disease progression.  7/7/21 complaints of 8/10 back pain and 8/10 LUQ pain. Referral to radiation for questionable mets on CT chest.  7/26/2021 patient had CT scan of the head with contrast with did not show any evidence of metastatic disease.  CT scan of the chest abdomen and pelvis did not show any evidence of progressive disease.  7/26/2021 patient had CEA level which shows declining values CA 15-3 has decreased to 62 from 84  11/3/2021: Patient had CT scan of the chest, abdomen and pelvis. In the chest there were no suspicious pulmonary nodules. There is no clear evidence of progressive disease  12/13/2021 patient had a bone scan which showed uptake along the posterior right ribs consistent with rib fractures.  There is mild uptake in the L4 vertebral body corresponding to the sclerotic lesion seen on previous CT scan.  This was likely due to metastatic disease  10/6/2022: Patient has been lost to follow-up.  She stopped Ibrance due to pulmonary issues.  Apparently patient went to the emergency room and was told that Ibrance was causing lung damage.  October 6, 2022: She has a increasing CA 15-3 and CA 27.29 as well as CEA level.  10/19/2022: Patient had guardant 360 testing done which was negative  10/31/2022: Patient had bone scan which basically showed evidence for mild progression of multifocal osseous metastatic disease.  There appears to be new activity corresponding to the thoracic cervical spine, left scapula, left sacrum and left proximal femur.  CT scan of the chest otherwise is stable.  There is a nonobstructing stone on the left kidney.        Past Medical History:   Diagnosis Date    Allergic     Bone cancer      METASTATIC BONE    Bradycardia     SECONDARY TO ABLATION    Breast cancer     mets to lymph nodes; did not do radiation    Cancer of unknown origin     Compression fx, thoracic spine, open, initial encounter     Coronary artery disease     COVID-19 2021    Diabetes mellitus     Heart disease, unspecified     Hepatitis C     RESOLVED WITH MEDICATION    Hyperlipidemia     Hypertension     Obesity (BMI 30-39.9) 2021    Rib fracture     Per patient    Sleep apnea     no machine    Tachycardia     ATRIAL    Type 2 diabetes mellitus 2017       Past Surgical History:   Procedure Laterality Date    BACK SURGERY      neck X 2    BREAST RECONSTRUCTION Bilateral     CARDIAC ABLATION       atrial tachycardia x 5 ablations     CARDIAC CATHETERIZATION      CARDIAC ELECTROPHYSIOLOGY PROCEDURE N/A 2023    Procedure: Pacemaker RV lead insertion with generator change out. [employer];  Surgeon: Steven Hammond MD;  Location: [facility] CATH INVASIVE LOCATION;  Service: Cardiovascular;  Laterality: N/A;    CARDIAC SURGERY      stent placed in aorta    CARDIAC SURGERY      6 surgeries as baby     CERVICAL FUSION ANTERIOR WITH ARTIFICIAL DISCECTOMY IMPLANTATION N/A 2020    Procedure: C4 VERTEBRECTOMY AND ANTERIOR CERIVCAL DISCECTOMY WITH FUSION OF CERVICAL THREE THROUGH FIVE WITH REMOVAL OF HARDWARE C5-C6;  Surgeon: Mark Kaba MD;  Location: [facility] MAIN OR;  Service: Neurosurgery;  Laterality: N/A;     SECTION      x2    COLONOSCOPY N/A 10/23/2020    Procedure: COLONOSCOPY WITH POLYPECTOMY X6;  Surgeon: Stanley Bourne MD;  Location: [facility] ENDOSCOPY;  Service: Gastroenterology;  Laterality: N/A;  POLYPS, INTERNAL HEMORRHOIDS    ENDOMETRIAL ABLATION      REMOVAL SCAR TISSUE UTERINE    ENDOSCOPY N/A 10/23/2020    Procedure: ESOPHAGOGASTRODUODENOSCOPY WITH BIOPSY X 1 AREA;  Surgeon: Stanley Bourne MD;  Location: [facility] ENDOSCOPY;  Service: Gastroenterology;  Laterality:  "N/A;  GASTRITIS, ESOPHAGITIS, HIATAL HERNIA    KNEE SURGERY  2000    MASTECTOMY Bilateral     NECK SURGERY      PACEMAKER IMPLANTATION      PAIN PUMP INSERTION/REVISION N/A 4/5/2022    Procedure: PAIN PUMP INSERTION AND INTRATHECAL CATHETER PLACEMENT;  Surgeon: Chuck Hunter MD;  Location: [facility] MAIN OR;  Service: Pain Management;  Laterality: N/A;         Current Outpatient Medications:     Blood Glucose Monitoring Suppl (Accu-Chek Araceli) device, Use as instructed   To test blood sugar bid, Disp: 1 each, Rfl: 0    Calcium Carbonate-Vit D-Min (Calcium 600+D Plus Minerals) 600-400 MG-UNIT chewable tablet, Chew 600 mg 2 (Two) Times a Day., Disp: 60 each, Rfl: 6    Continuous Blood Gluc  (FreeStyle Rajeev 14 Day [place]) device, 1 each Daily., Disp: 1 each, Rfl: 0    Continuous Blood Gluc Sensor (FreeStyle Rajeev 14 Day Sensor) misc, 1 each Daily., Disp: 6 each, Rfl: 3    exemestane (AROMASIN) 25 MG tablet, Take 1 tablet by mouth Daily., Disp: , Rfl:     famotidine (PEPCID) 20 MG tablet, Take 1 tablet by mouth 2 (Two) Times a Day As Needed for Heartburn., Disp: , Rfl:     HYDROcodone-acetaminophen (NORCO) 7.5-325 MG per tablet, Take 1 tablet by mouth Every 6 (Six) Hours As Needed for Moderate Pain., Disp: 20 tablet, Rfl: 0    insulin NPH-insulin regular (humuLIN 70/30,novoLIN 70/30) (70-30) 100 UNIT/ML injection, Inject 10-15 Units under the skin into the appropriate area as directed Every Morning., Disp: , Rfl:     insulin NPH-insulin regular (humuLIN 70/30,novoLIN 70/30) (70-30) 100 UNIT/ML injection, Inject 5 Units under the skin into the appropriate area as directed Every Evening. If BS over 180, Disp: , Rfl:     Insulin Pen Needle (B-D UF III MINI PEN NEEDLES) 31G X 5 MM misc, Use to inject insulin twice daily   DX: E11.9, Disp: 100 each, Rfl: 0    Insulin Syringe-Needle U-100 28G X 1/2\" 1 ML misc, 1 each 2 (Two) Times a Day., Disp: 100 each, Rfl: 6    losartan (Cozaar) 50 MG tablet, Take 1 tablet by " mouth Daily. Discontinue the lisinopril due to interaction with new chemotherapy, Disp: 90 tablet, Rfl: 1    Morphine Sulfate, PF, 8 MG/ML solution, 1 mL by Intrathecal Infusion route Daily. Receives 8 mg through pain pump q 24 hrs, Disp: , Rfl:     rosuvastatin (Crestor) 20 MG tablet, Take 1 tablet by mouth Every Night., Disp: 90 tablet, Rfl: 0    Allergies   Allergen Reactions    Oxycodone Nausea And Vomiting    Promethazine Other (See Comments)     Hyperactive mean    Tape Other (See Comments)     .blisters         Family History   Problem Relation Age of Onset    Heart disease Mother     Stroke Mother     Lung cancer Mother     Aneurysm Father     Diabetes Sister     No Known Problems Brother     No Known Problems Brother     Diabetes type I Half-Sister     Thyroid cancer Half-Sister     Cancer Maternal Aunt     Heart attack Sister     Thyroid disease Sister     No Known Problems Sister        Cancer-related family history includes Cancer in her maternal aunt; Lung cancer in her mother; Thyroid cancer in her half-sister.    Social History     Tobacco Use    Smoking status: Never     Passive exposure: Never    Smokeless tobacco: Never   Vaping Use    Vaping Use: Never used   Substance Use Topics    Alcohol use: Not Currently     Comment: drinks rarely    Drug use: Never       I have reviewed and confirmed the accuracy of the patient's history: Chief complaint, HPI, ROS, and Subjective as entered by the MA/LPN/RN. Марияmiles Nunez MD 12/18/23        SUBJECTIVE:    Patient could not tolerate Afinitor.  She has not started Aromasin    She is on abemaciclib stable.  Has had some diarrhea but she is able to drink fluids.    She had a pacemaker placed last week      Gladys Garrison reports a pain score of 9.  Given her pain assessment as noted, treatment options were discussed and the following options were decided upon as a follow-up plan to address the patient's pain: continuation of current treatment plan for  "pain and referral to specialist for assistance in pain treatment guidance.       REVIEW OF SYSTEMS:     Review of Systems   Constitutional:  Negative for chills and fever.   HENT:  Negative for ear pain, mouth sores, nosebleeds and sore throat.    Eyes:  Negative for photophobia and visual disturbance.   Respiratory:  Negative for wheezing and stridor.    Cardiovascular:  Negative for chest pain and palpitations.   Gastrointestinal:  Negative for abdominal pain, diarrhea, nausea and vomiting.   Endocrine: Negative for cold intolerance and heat intolerance.   Genitourinary:  Negative for dysuria and hematuria.   Musculoskeletal:  Negative for joint swelling and neck stiffness.   Skin:  Negative for color change and rash.   Neurological:  Negative for seizures and syncope.   Hematological:  Negative for adenopathy.        No obvious bleeding   Psychiatric/Behavioral:  Negative for agitation, confusion and hallucinations.          OBJECTIVE:    Vitals:    12/18/23 1329   BP: 135/79   Pulse: 60   Resp: 18   Temp: 98 °F (36.7 °C)   TempSrc: Infrared   SpO2: 91%   Weight: 60.8 kg (134 lb)   Height: 152.4 cm (60\")   PainSc:   9         Body mass index is 26.17 kg/m².    ECOG    (1) Restricted in physically strenuous activity, ambulatory and able to do work of light nature    Physical Exam  Vitals and nursing note reviewed.   Constitutional:       General: She is not in acute distress.     Appearance: Normal appearance. She is not diaphoretic.   HENT:      Head: Normocephalic and atraumatic.      Mouth/Throat:      Mouth: Mucous membranes are moist.      Pharynx: Oropharynx is clear.   Eyes:      General: No scleral icterus.        Right eye: No discharge.         Left eye: No discharge.      Extraocular Movements: Extraocular movements intact.      Conjunctiva/sclera: Conjunctivae normal.   Neck:      Thyroid: No thyromegaly.   Cardiovascular:      Rate and Rhythm: Normal rate and regular rhythm.      Pulses: Normal " pulses.      Heart sounds: Normal heart sounds.      No friction rub. No gallop.   Pulmonary:      Effort: Pulmonary effort is normal. No respiratory distress.      Breath sounds: Normal breath sounds. No stridor. No wheezing.   Abdominal:      General: Bowel sounds are normal. There is distension.      Palpations: Abdomen is soft. There is no mass.      Tenderness: There is abdominal tenderness. There is guarding (Left upper abdomen). There is no rebound.   Musculoskeletal:         General: No tenderness. Normal range of motion.      Cervical back: Normal range of motion and neck supple.      Right lower leg: No edema.      Left lower leg: No edema.      Comments: Neck pain.  Patient has a neck collar.   Lymphadenopathy:      Cervical: No cervical adenopathy.   Skin:     General: Skin is warm and dry.      Capillary Refill: Capillary refill takes less than 2 seconds.      Findings: No bruising, erythema or rash.   Neurological:      Mental Status: She is alert and oriented to person, place, and time.      Cranial Nerves: No cranial nerve deficit.      Sensory: No sensory deficit.      Motor: No abnormal muscle tone.   Psychiatric:         Mood and Affect: Mood normal.         Behavior: Behavior normal.         Thought Content: Thought content normal.         Judgment: Judgment normal.       Right foot noted but no redness or increase in warmth    I have reexamined the patient and the results are consistent with the previously documented exam. Мария Nunez MD      RECENT LABS    WBC   Date Value Ref Range Status   12/18/2023 4.27 3.40 - 10.80 10*3/mm3 Final     RBC   Date Value Ref Range Status   12/18/2023 4.06 3.77 - 5.28 10*6/mm3 Final     Hemoglobin   Date Value Ref Range Status   12/18/2023 11.7 (L) 12.0 - 15.9 g/dL Final     Hematocrit   Date Value Ref Range Status   12/18/2023 37.4 34.0 - 46.6 % Final     MCV   Date Value Ref Range Status   12/18/2023 92.1 79.0 - 97.0 fL Final     MCH   Date Value  Ref Range Status   12/18/2023 28.8 26.6 - 33.0 pg Final     MCHC   Date Value Ref Range Status   12/18/2023 31.3 (L) 31.5 - 35.7 g/dL Final     RDW   Date Value Ref Range Status   12/18/2023 14.2 12.3 - 15.4 % Final     RDW-SD   Date Value Ref Range Status   12/18/2023 46.3 37.0 - 54.0 fl Final     MPV   Date Value Ref Range Status   12/18/2023 9.9 6.0 - 12.0 fL Final     Platelets   Date Value Ref Range Status   12/18/2023 126 (L) 140 - 450 10*3/mm3 Final     Neutrophil %   Date Value Ref Range Status   12/18/2023 82.7 (H) 42.7 - 76.0 % Final     Lymphocyte %   Date Value Ref Range Status   12/18/2023 9.6 (L) 19.6 - 45.3 % Final     Monocyte %   Date Value Ref Range Status   12/18/2023 5.9 5.0 - 12.0 % Final     Eosinophil %   Date Value Ref Range Status   12/18/2023 1.6 0.3 - 6.2 % Final     Basophil %   Date Value Ref Range Status   12/18/2023 0.2 0.0 - 1.5 % Final     Immature Grans %   Date Value Ref Range Status   07/20/2020 0.7 (H) 0.0 - 0.5 % Final     Neutrophils, Absolute   Date Value Ref Range Status   12/18/2023 3.53 1.70 - 7.00 10*3/mm3 Final     Lymphocytes, Absolute   Date Value Ref Range Status   12/18/2023 0.41 (L) 0.70 - 3.10 10*3/mm3 Final     Monocytes, Absolute   Date Value Ref Range Status   12/18/2023 0.25 0.10 - 0.90 10*3/mm3 Final     Eosinophils, Absolute   Date Value Ref Range Status   12/18/2023 0.07 0.00 - 0.40 10*3/mm3 Final     Basophils, Absolute   Date Value Ref Range Status   12/18/2023 0.01 0.00 - 0.20 10*3/mm3 Final     Immature Grans, Absolute   Date Value Ref Range Status   07/20/2020 0.05 0.00 - 0.05 10*3/mm3 Final     nRBC   Date Value Ref Range Status   12/01/2023 0.1 0.0 - 0.2 /100 WBC Final       Lab Results   Component Value Date    GLUCOSE 131 (H) 12/04/2023    BUN 10 12/04/2023    CREATININE 0.71 12/04/2023    EGFRIFNONA 70 12/20/2021    EGFRIFAFRI >60 10/12/2018    BCR 14.1 12/04/2023    K 3.6 12/04/2023    CO2 28.0 12/04/2023    CALCIUM 9.2 12/04/2023    PROTENTOTREF  7.4 12/14/2020    ALBUMIN 4.0 12/04/2023    LABIL2 0.9 12/14/2020    AST 28 12/04/2023    ALT 17 12/04/2023       ASSESSMENT:    Metastatic breast cancer presenting as 1.1 cm mixed lytic/sclerotic hypermetabolic lesion in the left hemisacrum suspicious for metastatic disease 1.2 cm sclerotic lesion on L4, small L3 lesion and T11 lesion.  Tumor is ER positive, UT positive and HER-2/bishop negative.  Patient was prescribed combination of Ibrance and Arimidex.  Continued Ibrance due to pulmonary toxicity.  She was then placed on single agent Arimidex.  Upon progression on Arimidex was switched to Faslodex.  She developed L1 vertebral body progressive disease on Faslodex for that reason we will discontinue Faslodex and begin combination of Aromasin and Afinitor.  Patient took Afinitor for 3 days and discontinued due to nausea and fatigue.  [id number] does not show any actionable mutation.  She does not have any soft tissue for us to biopsy for additional NGS testing  We will discontinue Afinitor from her regimen and offer patient abemaciclib stable along with Aromasin.  We reviewed that abemaciclib can lead to pancytopenia, diarrhea fatigue interstitial pneumonitis.  She was given information as well to review and we will plan to start her soon as medication has been approved by her insurance  She is currently on Aromasin and abemaciclib stable.  She will continue the same  Abemaciclib induced diarrhea: Patient will continue to use Imodium.  This seems to be helping  Fatigue secondary to abemaciclib stable   Thrombocytopenia due to abemaciclib  Bone metastasis:.  Biphosphonate's has been recommended patient has not been able to have due to ongoing dental issues  Right foot swelling: Doppler study was negative  History of medical noncompliance  Pancytopenia secondary to Ibrance: Improved off Ibrance  L1 vertebral body involvement.  Currently undergoing XRT.  Patient is also established with Dr. Ruff.  MRI of the spine  0 has been scheduled for October 2023  Pain management  ypT2 N1 aM0 status post left modified radical mastectomy with lymph node dissection and right prophylactic mastectomy in 2017 performed at [facility].  ER positive, SD positive and HER-2/bishop negative.  Status post bilateral chest wall reconstruction.  According to patient, she tolerated Arimidex very well except that she also had osteoporosis therefore Arimidex was discontinued at that time  Intolerance of tamoxifen in the past  Osteoporosis: We will transition to Xgeva since she has bone metastases  Status post neoadjuvant Arimidex prior to bilateral mastectomies  Thrombocytopenia: Work-up was - December 2020  Neck pain status post cervical spine surgery: Resolved  Complex cardiac medical history including tetralogy of Fallot, coarctation of aorta, VSD status post repair.  Status post stent placement for coarctation of aorta  Personal history and strong family history of breast cancer in multiple in the relatives on paternal side of the family including 4 paternal aunts in their 30s and 40s and 2 maternal aunts at age of 20s and 50s.  There is concern for possible hereditary breast cancer syndrome.  Patient may be  interested in pursuing cancer genetics for her management.  Assessment has been reviewed and updated          Discussion    Patient has metastatic breast cancer estrogen and progesterone dependent and HER-2/bishop negative.  She is currently on Arimidex.  We will add Ibrance.  We will also discontinue Prolia and begin Xgeva to help reduce skeletal events.           Plans:     Reviewed her recent bone scan, CT scan chest abdomen and pelvis.  Will be due for CT of the chest and bone scan in January 2024.  She had a CT abdomen and pelvis October 2023  Discontinue Faslodex due to L spine vertebral body with progression  Continue combination of Aromasin 25 mg p.o. daily and abemaciclib stable.  Continue to increase her p.o. fluids  She  will be given information today on abemaciclib stable  Thrombocytopenia has resolved  [facility] pharmacist follow-up with her next week  Checked tumor markers for CEA CA 15-3 and CA 27.29 reviewed  She has completed  palliative XRT to her lumbar spine  Follow-up with Dr. Ruff with neurosurgical services  Guardant 360 for NGS testing was negative for any actionable mutations  Follow-up with pain management with Dr. Hunter  Guardian 360 does not show any actionable mutation.  Would consider soft tissue biopsy at time of progression for additional NGS testing.  Reviewed with patient  Referred to pulmonary for her dyspnea: Dr. Julio.  Appointment is pending  Follow-up with pain management for ongoing pain issues  Follow-up with /counselor   Reviewed work-up for thrombocytopenia which was negative  Reviewed her bone density which showed osteoporosis  Schedule Xgeva 120 mg subcu monthly once her dental procedures are completed  All questions answered  Follow-up 4 weeks or earlier as needed  All questions answered            Patient verbalized understanding and is in agreement of the above plan.           I spent 30 total minutes, face-to-face, caring for Gladys today. 90% of this time involved counseling and/or coordination of care as documented within this note.

## 2023-12-19 ENCOUNTER — SPECIALTY PHARMACY (OUTPATIENT)
Dept: PHARMACY | Facility: HOSPITAL | Age: 63
End: 2023-12-19
Payer: MEDICARE

## 2023-12-20 ENCOUNTER — TELEPHONE (OUTPATIENT)
Dept: CARDIOLOGY | Facility: CLINIC | Age: 63
End: 2023-12-20
Payer: MEDICARE

## 2023-12-20 NOTE — TELEPHONE ENCOUNTER
Dr. Hammond has her returning to work today with restrictions.  No heavy lifting and no lifting left arm arm above shoulder. [employer] will not let her return to work with restrictions. She is very upset. She can not pay her bills if she can not work. She is a  at [employer]. Patient asking if Dr. Hammond can take off the restrictions and have her return to work Monday.

## 2023-12-20 NOTE — TELEPHONE ENCOUNTER
Okay to go back to work this coming Monday.  Just emphasize to try not to raise the left arm above shoulder length if possible.

## 2023-12-20 NOTE — TELEPHONE ENCOUNTER
Called Patient back and stated that its   Okay to go back to work this coming Monday.  Just emphasize to try not to raise the left arm above shoulder length if possible.  Patient asked for a letter I have one filled out and in her chart. Pt is asking that it not say she has any restrictions due to [employer] will not let her return even though she is a  and its getting hard due to her having to pay bills.

## 2024-01-02 ENCOUNTER — OFFICE VISIT (OUTPATIENT)
Dept: FAMILY MEDICINE CLINIC | Facility: CLINIC | Age: 64
End: 2024-01-02
Payer: MEDICARE

## 2024-01-02 VITALS
DIASTOLIC BLOOD PRESSURE: 58 MMHG | SYSTOLIC BLOOD PRESSURE: 126 MMHG | RESPIRATION RATE: 18 BRPM | BODY MASS INDEX: 26.5 KG/M2 | HEIGHT: 60 IN | OXYGEN SATURATION: 97 % | WEIGHT: 135 LBS | HEART RATE: 71 BPM

## 2024-01-02 DIAGNOSIS — Z95.0 PACEMAKER: ICD-10-CM

## 2024-01-02 DIAGNOSIS — E78.5 HYPERLIPIDEMIA, UNSPECIFIED HYPERLIPIDEMIA TYPE: Chronic | ICD-10-CM

## 2024-01-02 DIAGNOSIS — I10 PRIMARY HYPERTENSION: Chronic | ICD-10-CM

## 2024-01-02 DIAGNOSIS — E11.9 CONTROLLED TYPE 2 DIABETES MELLITUS WITHOUT COMPLICATION, WITH LONG-TERM CURRENT USE OF INSULIN: Primary | Chronic | ICD-10-CM

## 2024-01-02 DIAGNOSIS — Z79.4 CONTROLLED TYPE 2 DIABETES MELLITUS WITHOUT COMPLICATION, WITH LONG-TERM CURRENT USE OF INSULIN: Primary | Chronic | ICD-10-CM

## 2024-01-02 DIAGNOSIS — M62.838 MUSCLE SPASM: ICD-10-CM

## 2024-01-02 RX ORDER — CYCLOBENZAPRINE HCL 10 MG
10 TABLET ORAL 2 TIMES DAILY PRN
Qty: 30 TABLET | Refills: 1 | Status: SHIPPED | OUTPATIENT
Start: 2024-01-02

## 2024-01-02 NOTE — PROGRESS NOTES
"Chief Complaint  Chief Complaint   Patient presents with    Follow-up     Subjective        Gladys Garrison is a 63 y.o. female who presents to [facility].    History of Present Illness  Recent pacemaker replacement.    Highest blood sugar has been 158.  She is not taking much insulin unless her blood sugar gets above 150.  She will take 5 units for the above.      She is seeing Dr. Nunez for breast cancer.  She has another scan coming up on the 8th.      She is seeing Dr. Hunter for pain management, she has pain pump w/ morphine.      Objective   /58   Pulse 71   Resp 18   Ht 152.4 cm (60\")   Wt 61.2 kg (135 lb)   SpO2 97%   BMI 26.37 kg/m²     Estimated body mass index is 26.37 kg/m² as calculated from the following:    Height as of this encounter: 152.4 cm (60\").    Weight as of this encounter: 61.2 kg (135 lb).     Physical Exam   GEN: In no acute distress, non toxic appearing  SKIN: Bruising L upper chest  NEURO: AAO to person, place, and time. CN 2-12 intact grossly.   PSYCH: Affect normal, insight fair     PHQ-2 Depression Screening  Little interest or pleasure in doing things? 0-->not at all   Feeling down, depressed, or hopeless? 0-->not at all   PHQ-2 Total Score 0      Result Review :              Assessment and Plan     Diagnoses and all orders for this visit:    1. Controlled type 2 diabetes mellitus without complication, with long-term current use of insulin (Primary)  Continue insulin 70/30 for elevated blood sugars.  Recheck A1C q 6 months, not due today.  Last A1C was 6.2 Sept 2023.    Continue freestyle chacho CGM system as she has had multiple hypoglycemic episodes leading to hospitalizations in the past.      2. Primary hypertension  Continue losartan 50 mg daily.    3. Hyperlipidemia, unspecified hyperlipidemia type  Continue rosuvastatin 20 mg daily.    4. Muscle spasm  Refill flexeril 10 mg bid prn.  -     cyclobenzaprine (FLEXERIL) 10 MG tablet; " Take 1 tablet by mouth 2 (Two) Times a Day As Needed for Muscle Spasms.  Dispense: 30 tablet; Refill: 1    5. Pacemaker  Bruising today overall fine.  No signs of infection.  Keep f/u with cardiology next week.         Follow Up     Return in about 4 months (around 5/2/2024) for Recheck.

## 2024-01-03 ENCOUNTER — TELEPHONE (OUTPATIENT)
Dept: FAMILY MEDICINE CLINIC | Facility: CLINIC | Age: 64
End: 2024-01-03
Payer: MEDICARE

## 2024-01-03 DIAGNOSIS — N30.01 ACUTE CYSTITIS WITH HEMATURIA: Primary | ICD-10-CM

## 2024-01-03 RX ORDER — SULFAMETHOXAZOLE AND TRIMETHOPRIM 800; 160 MG/1; MG/1
1 TABLET ORAL 2 TIMES DAILY
Qty: 10 TABLET | Refills: 0 | Status: SHIPPED | OUTPATIENT
Start: 2024-01-03 | End: 2024-01-08

## 2024-01-03 NOTE — TELEPHONE ENCOUNTER
Caller: Gladys Garrison    Relationship: Self    Best call back number: [phone]     What medication are you requesting: ANTIBIOTIC     What are your current symptoms: PRESSURE, PAIN, SOME BLEEDING WHEN URINATING     How long have you been experiencing symptoms: 01/03/24    Have you had these symptoms before:    [x] Yes  [] No    Have you been treated for these symptoms before:   [x] Yes  [] No    If a prescription is needed, what is your preferred pharmacy and phone number: [place] [address] [facility] [phone] [facility] [phone]      Additional notes: PATIENT WOKE UP WITH A LOT OF PAIN, PRESSURE AND URGENCY AND IS REQUESTING A NEW MEDICATION FOR URINARY TRACT INFECTION.     PLEASE CALL TO ADVISE.

## 2024-01-03 NOTE — TELEPHONE ENCOUNTER
PATIENT CALLED BACK IN TEARS SAYING SHE IS NOW POURING BLOOD AND PASSING CLOTS AND HAVING PAINFUL CRAMPS. I TOLD HER SHE NEEDED TO BE SEEN HERE OR AT THE HOSPITAL. SHE SAID SHE DOES NOT HAVE TRANSPORTATION AND JUST NEEDS AN ANTIBIOTIC AND THAT DR DELAROSA WILL KNOW THESE ARE SIDE EFFECTS FROM HER CANCER MEDS. I TOLD HER EVEN WITH REGULAR UTI SYMPTOMS WE NEED A URINE SAMPLE AND WITH THOSE EXTREME SYMPTOMS SHE NEEDS TO BE SEEN AND SHE BECAME ANGRY AND BEGAN CURSING. I TOLD HER I WOULD SEND ANOTHER MESSAGE.

## 2024-01-03 NOTE — TELEPHONE ENCOUNTER
I called her and relayed same message as Judy that it is important for us to have a urine sample.  She is unable to come in today or be seen anywhere.  I will send in bactrim bid x 5 days.  If she does not improve I did relay to her that she will need to be seen or leave a urine sample.  I encouraged her to drink plenty of water which she states she is already doing.  She is also already using azo for the discomfort.

## 2024-01-04 ENCOUNTER — OFFICE VISIT (OUTPATIENT)
Dept: FAMILY MEDICINE CLINIC | Facility: CLINIC | Age: 64
End: 2024-01-04
Payer: MEDICARE

## 2024-01-04 VITALS
DIASTOLIC BLOOD PRESSURE: 84 MMHG | HEART RATE: 85 BPM | BODY MASS INDEX: 24.58 KG/M2 | SYSTOLIC BLOOD PRESSURE: 136 MMHG | HEIGHT: 60 IN | RESPIRATION RATE: 18 BRPM | OXYGEN SATURATION: 96 % | WEIGHT: 125.2 LBS

## 2024-01-04 DIAGNOSIS — R30.0 DYSURIA: Primary | ICD-10-CM

## 2024-01-04 DIAGNOSIS — R10.31 ABDOMINAL PAIN, RLQ: ICD-10-CM

## 2024-01-04 LAB
BILIRUB BLD-MCNC: NEGATIVE MG/DL
CLARITY, POC: ABNORMAL
COLOR UR: ABNORMAL
GLUCOSE UR STRIP-MCNC: NEGATIVE MG/DL
KETONES UR QL: NEGATIVE
LEUKOCYTE EST, POC: ABNORMAL
NITRITE UR-MCNC: NEGATIVE MG/ML
PH UR: 6 [PH] (ref 5–8)
PROT UR STRIP-MCNC: NEGATIVE MG/DL
RBC # UR STRIP: ABNORMAL /UL
SP GR UR: 1.01 (ref 1–1.03)
UROBILINOGEN UR QL: ABNORMAL

## 2024-01-04 PROCEDURE — 87086 URINE CULTURE/COLONY COUNT: CPT | Performed by: STUDENT IN AN ORGANIZED HEALTH CARE EDUCATION/TRAINING PROGRAM

## 2024-01-04 NOTE — LETTER
January 4, 2024     Patient: Gladys Garrison   YOB: 1960   Date of Visit: 1/4/2024       To Whom It May Concern:    It is my medical opinion that Gladys Garrison may return to work on 01/08/2024 . Please excuse from 1/3-1/8.           Sincerely,        Brandy Arboleda MD    CC:   No Recipients

## 2024-01-04 NOTE — PROGRESS NOTES
"[facility]   Established Patient Visit  01/04/2024    Patient Name: Gladys Garrison   YOB: 1960   Medical Record Number: [id number]   Primary Care Physician: Chirag Robles DO     Subjective     Chief Complaint     Chief Complaint   Patient presents with    Urinary Tract Infection       History of Present Illness   Gladys Garrison is a 63 y.o. female who presents to clinic  for dysuria .       Chronic Conditions   Urinary pressure, dysuria x 2 days: took OTC Azo  Vomiting x 3 (potentially due to chemo pill?)  Developed hematuria, she took antibiotic (found some at from prior PPM placemaker infection, unsure what it was), and took a few doses and it started working and hematuria improved. Called office and PCP (Dr. Robles) called in Bactrim for UTI.     Came in today because she developed right flank pain last night, states that she noticed it when she got up to urinate overnight. She has hx of kidney stone that \"never moved\" that she has had for years. She still has her appendix and curious if this is could be causing the pain.       Review of Systems   A medically appropriate and patient-specific review of systems was performed. Pertinent findings are mentioned in the HPI, with no additional significant findings beyond those already noted.      Patient History   The following portions of the patient's history were reviewed and updated as appropriate:   allergies, current medications, problem list, PMHx, PSHx, PFHx, past social history    Objective     Vitals:    01/04/24 1610   BP: 136/84   BP Location: Left arm   Patient Position: Sitting   Cuff Size: Adult   Pulse: 85   Resp: 18   SpO2: 96%   Weight: 56.8 kg (125 lb 3.2 oz)   Height: 152.4 cm (60\")        Physical Exam   Constitutional: Alert, well-appearing, no acute distress  HENT: NCAT, mucous membranes moist  Respiratory: No respiratory distress  Cardiovascular: RRR  GI: RLQ and suprapubic tenderness to " palpation with some guarding but decreases with distraction; no CVA tenderness    Assessment & Plan     Diagnoses and all orders for this visit:    Dysuria  Comments:  UA dipstick and culture ordered. Continue Bactrim as prescribed, will follow culture and adjust as indicated.      RLQ and Suprapubic Pain  Comments:  Likely related to UTI (as exam findings resolved with distraction), regardless, I informed patient that I could not exclude appendix involvement given location of pain; she verbalized understanding. Discussed that next step in evaluation would be CT imaging; patient declined and stated that she would follow up at the ER in the morning if pain was not improved. Reviewed signs/symptoms to monitor for and educated on consequences of untreated appendicitis, patient verbalized understanding and agreeable to plan.       In addition to the above, I also completed the following during today's visit: Educated patient and/or family on overall impression and recommended plan of care, patient encouraged to voice questions and all questions were answered, provided education on all new medications that were started including indication, proper administration and dosing, and side effects to watch for. , reviewed return precautions and reasons to seek urgent/emergent care        Orders Placed This Encounter   Procedures    Urine Culture - Urine, Urine, Clean Catch    POCT urinalysis dipstick, multipro          Follow Up   Return if symptoms worsen or fail to improve.  Patient was given instructions and counseling regarding her condition or for health maintenance advice. Please see specific information pulled into the AVS if appropriate.       This medical documentation was produced in part using speech recognition software (Dragon Dictation System) and may contain errors related to that system including errors in grammar, punctuation, and spelling, as well as words and phrases that may be inappropriate and not  intentional --- If there are any questions or concerns please feel free to contact me, the dictating provider, for clarification.      Brandy Arboleda MD

## 2024-01-05 LAB — BACTERIA SPEC AEROBE CULT: NO GROWTH

## 2024-01-08 ENCOUNTER — HOSPITAL ENCOUNTER (OUTPATIENT)
Dept: NUCLEAR MEDICINE | Facility: HOSPITAL | Age: 64
Discharge: HOME | End: 2024-01-08
Payer: MEDICARE

## 2024-01-08 ENCOUNTER — TELEPHONE (OUTPATIENT)
Dept: CARDIOLOGY | Facility: CLINIC | Age: 64
End: 2024-01-08

## 2024-01-08 ENCOUNTER — HOSPITAL ENCOUNTER (OUTPATIENT)
Dept: PET IMAGING | Facility: HOSPITAL | Age: 64
Discharge: HOME | End: 2024-01-08
Admitting: INTERNAL MEDICINE
Payer: MEDICARE

## 2024-01-08 DIAGNOSIS — C79.51 MALIGNANT NEOPLASM METASTATIC TO BONE: ICD-10-CM

## 2024-01-08 DIAGNOSIS — C50.919 MALIGNANT NEOPLASM OF FEMALE BREAST, UNSPECIFIED ESTROGEN RECEPTOR STATUS, UNSPECIFIED LATERALITY, UNSPECIFIED SITE OF BREAST: ICD-10-CM

## 2024-01-08 PROCEDURE — 0 TECHNETIUM MEDRONATE KIT: Performed by: INTERNAL MEDICINE

## 2024-01-08 PROCEDURE — 25510000001 IOPAMIDOL PER 1 ML: Performed by: INTERNAL MEDICINE

## 2024-01-08 PROCEDURE — A9503 TC99M MEDRONATE: HCPCS | Performed by: INTERNAL MEDICINE

## 2024-01-08 PROCEDURE — 71260 CT THORAX DX C+: CPT

## 2024-01-08 PROCEDURE — 78306 BONE IMAGING WHOLE BODY: CPT

## 2024-01-08 RX ORDER — TC 99M MEDRONATE 20 MG/10ML
26 INJECTION, POWDER, LYOPHILIZED, FOR SOLUTION INTRAVENOUS
Status: COMPLETED | OUTPATIENT
Start: 2024-01-08 | End: 2024-01-08

## 2024-01-08 RX ADMIN — TC 99M MEDRONATE 26 MILLICURIE: 20 INJECTION, POWDER, LYOPHILIZED, FOR SOLUTION INTRAVENOUS at 08:24

## 2024-01-08 RX ADMIN — IOPAMIDOL 100 ML: 755 INJECTION, SOLUTION INTRAVENOUS at 07:53

## 2024-01-08 NOTE — TELEPHONE ENCOUNTER
Caller: Gladys Garrison    Relationship to patient: Self    Best call back number: [phone]    Chief complaint: FAMILY EMERGENCY    Type of visit: DEVICE CHECK AND FOLLOW UP    Requested date: OFF MONDAYS AND TUESDAYS     If rescheduling, when is the original appointment: 1.8.24     Additional notes:PATIENT HAD A FAMILY EMERGENCY AND NEEDS TO RESCHEDULE HER DEVICE CHECK AND FOLLOW UP. HUB WAS UNABLE TO DO IT IN THE TIME FRAME. PLEASE CALL HER BACK, , TO RESCHEDULE. THANK YOU!

## 2024-01-09 NOTE — TELEPHONE ENCOUNTER
Caller: Gladys Garrison    Relationship: Self    Best call back number: [phone]     What orders are you requesting (i.e. lab or imaging): CORTISONE SHOTS AND MRI, CARDIOLOGIST    In what timeframe would the patient need to come in: SOON    Where will you receive your lab/imaging services: [facility]    Additional notes: STATED SHE IS NEEDING A REFERRAL FOR THE MRI AND SHE WOULD LIKE TO SEE SOMEONE ABOUT A POSSIBLE CORTISONE SHOT IN HER ARM. STATED SHE IS DROPPING THINGS SHE ALSO NEEDS A REFERRAL TO A CARDIOLOGIST     PLEASE ADVISE         None DISPLAY PLAN FREE TEXT

## 2024-01-15 ENCOUNTER — TELEPHONE (OUTPATIENT)
Dept: CARDIOLOGY | Facility: CLINIC | Age: 64
End: 2024-01-15

## 2024-01-19 NOTE — PROGRESS NOTES
Hematology/Oncology Outpatient Follow Up    PATIENT NAME:Gladys Garrison  :1960  MRN: [id number]  PRIMARY CARE PHYSICIAN: Chirag Robles DO  REFERRING PHYSICIAN: No ref. provider found      Chief Complaint   Patient presents with    Follow-up     Malignant neoplasm of female breast, unspecified estrogen receptor status, unspecified laterality, unspecified site of breast        HISTORY OF PRESENT ILLNESS:     This is a 63-year-old female who multiple comorbidities including congenital abnormalities such as hypoplastic kidney, tetralogy of Fallot, coarctation of the aorta and congenital VSD status post repair.  Patient developed syncopal episode and for that reason she had she had a CT scan of the chest which showed mass in the left breast.  She had diagnostic mammogram and ultrasound which showed a 2 cm spiculated mass in the left breast at the 1 o'clock position 6 cm from the nipple.  Biopsy of the left breast mass revealed invasive moderately differentiated carcinoma ER/AZ positive and HER-2/bishop negative.  Patient also had an ultrasound of the axilla which was concerning for an abnormal left axillary lymph node with cortical thickening. She apparently had an FNA of the left axillary nodule which was positive for malignancy.  On 2017 patient underwent left modified radical mastectomy, right prophylactic mastectomy and immediate breast reconstruction. She also underwent right prophylactic mastectomy.  We have had her on records suggest that patient did have multifocal disease with pT2 pN1 aM0.  The largest focus measured 3.5 cm.  2 of 11 lymph nodes were positive for metastatic disease some with extracapsular extension.  Notes that patient did receive Arimidex neoadjuvant  from 2017 to 2018 prior to her bilateral mastectomies.    Review of her note suggest that she developed cough which she attributed to anastrozole and patient was then placed on tamoxifen.  She had MammaPrint  testing which returned with low risk for relapse.    Her postop course was also complicated by left chest wall abscess which resulted in I&D and removal of the left tissue expander. She was referred for radiation treatment boards ultimately declined.    Patient was then placed on tamoxifen in April 2018 which she stopped after less than a month due to nausea and vomiting.  Patient in the interim also was diagnosed with chronic hepatitis C was seen by the hepatologist.  Tamoxifen was dose reduced to 10 mg daily with the goal to increase to 20 mg daily.      Patient has relocated to [facility].  She has transitioned her care to us now.  She is currently not on any antiestrogen therapy.     Her bilateral mastectomy specimen are available for review    1/29/2021 patient had bone density which showed osteoporosis  Patient was seen by neurosurgery and had a bone scan done in March 2021 which showed subtle activity in the inferior L4 vertebral body appears to correlate with new area of sclerosis on CT of the abdomen and pelvis.  There is also subtle activity with possible new lesion at the posterior left sacrum.  These findings were concerning for metastatic disease.  PET CT scan was recommended to further evaluate.  4/8/2021 patient had a PET CT scan which showed evidence of disease in the neck chest abdomen and pelvis.  But she has a 1.1 cm mixed lytic/sclerotic hypermetabolic lesion within the left hemisacrum consistent with metastatic disease.  There is also accumulation within the inferior endplate of the L4 vertebral body thought to correspond to 1.2 centimeters sclerotic lesion consistent with metastatic disease.  There is a tiny hypermetabolic focus within the L3, T11.  Suspicious for also early metastatic disease.  4/26/2021 patient underwent CT-guided biopsy of sacral lesion, pathology was consistent with metastatic breast cancer ER and [place] positive HER-2/bishop was negative.  7/6/21 CT new sclerosis within the  right 12th rib posteriorly which could represent metastatic lesion or a healed or healing fracture, new sclerosis within the right 12th rib posteriorly which         could represent metastatic lesion,new sclerosis within the right T8 transverse process worrisome   for metastatic        disease progression.  7/7/21 complaints of 8/10 back pain and 8/10 LUQ pain. Referral to radiation for questionable mets on CT chest.  7/26/2021 patient had CT scan of the head with contrast with did not show any evidence of metastatic disease.  CT scan of the chest abdomen and pelvis did not show any evidence of progressive disease.  7/26/2021 patient had CEA level which shows declining values CA 15-3 has decreased to 62 from 84  11/3/2021: Patient had CT scan of the chest, abdomen and pelvis. In the chest there were no suspicious pulmonary nodules. There is no clear evidence of progressive disease  12/13/2021 patient had a bone scan which showed uptake along the posterior right ribs consistent with rib fractures.  There is mild uptake in the L4 vertebral body corresponding to the sclerotic lesion seen on previous CT scan.  This was likely due to metastatic disease  10/6/2022: Patient has been lost to follow-up.  She stopped Ibrance due to pulmonary issues.  Apparently patient went to the emergency room and was told that Ibrance was causing lung damage.  October 6, 2022: She has a increasing CA 15-3 and CA 27.29 as well as CEA level.  10/19/2022: Patient had guardant 360 testing done which was negative  10/31/2022: Patient had bone scan which basically showed evidence for mild progression of multifocal osseous metastatic disease.  There appears to be new activity corresponding to the thoracic cervical spine, left scapula, left sacrum and left proximal femur.  CT scan of the chest otherwise is stable.  There is a nonobstructing stone on the left kidney.        Past Medical History:   Diagnosis Date    Allergic     Bone cancer      METASTATIC BONE    Bradycardia     SECONDARY TO ABLATION    Breast cancer     mets to lymph nodes; did not do radiation    Cancer of unknown origin     Compression fx, thoracic spine, open, initial encounter     Coronary artery disease     COVID-19 2021    Diabetes mellitus     Heart disease, unspecified     Hepatitis C     RESOLVED WITH MEDICATION    Hyperlipidemia     Hypertension     Obesity (BMI 30-39.9) 2021    Rib fracture     Per patient    Sleep apnea     no machine    Tachycardia     ATRIAL    Type 2 diabetes mellitus 2017       Past Surgical History:   Procedure Laterality Date    BACK SURGERY      neck X 2    BREAST RECONSTRUCTION Bilateral     CARDIAC ABLATION       atrial tachycardia x 5 ablations     CARDIAC CATHETERIZATION      CARDIAC ELECTROPHYSIOLOGY PROCEDURE N/A 2023    Procedure: Pacemaker RV lead insertion with generator change out. Spatial Photonicstronic;  Surgeon: Steven Hammond MD;  Location: [facility] CATH INVASIVE LOCATION;  Service: Cardiovascular;  Laterality: N/A;    CARDIAC SURGERY      stent placed in aorta    CARDIAC SURGERY      6 surgeries as baby     CERVICAL FUSION ANTERIOR WITH ARTIFICIAL DISCECTOMY IMPLANTATION N/A 2020    Procedure: C4 VERTEBRECTOMY AND ANTERIOR CERIVCAL DISCECTOMY WITH FUSION OF CERVICAL THREE THROUGH FIVE WITH REMOVAL OF HARDWARE C5-C6;  Surgeon: Mark Kaba MD;  Location: [facility] MAIN OR;  Service: Neurosurgery;  Laterality: N/A;     SECTION      x2    COLONOSCOPY N/A 10/23/2020    Procedure: COLONOSCOPY WITH POLYPECTOMY X6;  Surgeon: Stanley Bourne MD;  Location: [facility] ENDOSCOPY;  Service: Gastroenterology;  Laterality: N/A;  POLYPS, INTERNAL HEMORRHOIDS    ENDOMETRIAL ABLATION      REMOVAL SCAR TISSUE UTERINE    ENDOSCOPY N/A 10/23/2020    Procedure: ESOPHAGOGASTRODUODENOSCOPY WITH BIOPSY X 1 AREA;  Surgeon: Stanley Bourne MD;  Location: [facility] ENDOSCOPY;  Service: Gastroenterology;  Laterality:  "N/A;  GASTRITIS, ESOPHAGITIS, HIATAL HERNIA    KNEE SURGERY  2000    MASTECTOMY Bilateral     NECK SURGERY      PACEMAKER IMPLANTATION      PAIN PUMP INSERTION/REVISION N/A 4/5/2022    Procedure: PAIN PUMP INSERTION AND INTRATHECAL CATHETER PLACEMENT;  Surgeon: Chuck Hunter MD;  Location: [facility] MAIN OR;  Service: Pain Management;  Laterality: N/A;         Current Outpatient Medications:     Blood Glucose Monitoring Suppl (Accu-Chek Araceli) device, Use as instructed   To test blood sugar bid, Disp: 1 each, Rfl: 0    Calcium Carbonate-Vit D-Min (Calcium 600+D Plus Minerals) 600-400 MG-UNIT chewable tablet, Chew 600 mg 2 (Two) Times a Day., Disp: 60 each, Rfl: 6    Continuous Blood Gluc  (FreeStyle Rajeev 14 Day [place]) device, 1 each Daily., Disp: 1 each, Rfl: 0    Continuous Blood Gluc Sensor (FreeStyle Rajeev 14 Day Sensor) misc, 1 each Daily., Disp: 6 each, Rfl: 3    cyclobenzaprine (FLEXERIL) 10 MG tablet, Take 1 tablet by mouth 2 (Two) Times a Day As Needed for Muscle Spasms., Disp: 30 tablet, Rfl: 1    exemestane (AROMASIN) 25 MG tablet, Take 1 tablet by mouth Daily., Disp: , Rfl:     famotidine (PEPCID) 20 MG tablet, Take 1 tablet by mouth 2 (Two) Times a Day As Needed for Heartburn., Disp: , Rfl:     insulin NPH-insulin regular (humuLIN 70/30,novoLIN 70/30) (70-30) 100 UNIT/ML injection, Inject 5 Units under the skin into the appropriate area as directed Every Evening. If BS over 180, Disp: , Rfl:     Insulin Pen Needle (B-D UF III MINI PEN NEEDLES) 31G X 5 MM misc, Use to inject insulin twice daily   DX: E11.9, Disp: 100 each, Rfl: 0    Insulin Syringe-Needle U-100 28G X 1/2\" 1 ML misc, 1 each 2 (Two) Times a Day., Disp: 100 each, Rfl: 6    losartan (Cozaar) 50 MG tablet, Take 1 tablet by mouth Daily. Discontinue the lisinopril due to interaction with new chemotherapy, Disp: 90 tablet, Rfl: 1    Morphine Sulfate, PF, 8 MG/ML solution, 1 mL by Intrathecal Infusion route Daily. Receives 8 mg through " pain pump q 24 hrs, Disp: , Rfl:     rosuvastatin (Crestor) 20 MG tablet, Take 1 tablet by mouth Every Night., Disp: 90 tablet, Rfl: 0    Allergies   Allergen Reactions    Oxycodone Nausea And Vomiting    Promethazine Other (See Comments)     Hyperactive mean    Tape Other (See Comments)     .blisters         Family History   Problem Relation Age of Onset    Heart disease Mother     Stroke Mother     Lung cancer Mother     Aneurysm Father     Diabetes Sister     No Known Problems Brother     No Known Problems Brother     Diabetes type I Half-Sister     Thyroid cancer Half-Sister     Cancer Maternal Aunt     Heart attack Sister     Thyroid disease Sister     No Known Problems Sister        Cancer-related family history includes Cancer in her maternal aunt; Lung cancer in her mother; Thyroid cancer in her half-sister.    Social History     Tobacco Use    Smoking status: Never     Passive exposure: Never    Smokeless tobacco: Never   Vaping Use    Vaping Use: Never used   Substance Use Topics    Alcohol use: Not Currently     Comment: drinks rarely    Drug use: Never         I have reviewed and confirmed the accuracy of the patient's history: Chief complaint, HPI, ROS, and Subjective as entered by the MA/LPN/RN. Мария Lian Nunez MD 01/22/24        SUBJECTIVE:    Patient could not tolerate Afinitor.  She has not started Aromasin    She is on abemaciclib stable.  Has had some diarrhea but she is able to drink fluids.    She had a pacemaker placed last week    Patient denies any new issues today.  She has occasional diarrhea on Verzenio      Darlinea L Bladimir reports a pain score of 8.  Given her pain assessment as noted, treatment options were discussed and the following options were decided upon as a follow-up plan to address the patient's pain: continuation of current treatment plan for pain and referral to specialist for assistance in pain treatment guidance.       REVIEW OF SYSTEMS:     Review of Systems  "  Constitutional:  Negative for chills and fever.   HENT:  Negative for ear pain, mouth sores, nosebleeds and sore throat.    Eyes:  Negative for photophobia and visual disturbance.   Respiratory:  Negative for wheezing and stridor.    Cardiovascular:  Negative for chest pain and palpitations.   Gastrointestinal:  Negative for abdominal pain, diarrhea, nausea and vomiting.   Endocrine: Negative for cold intolerance and heat intolerance.   Genitourinary:  Negative for dysuria and hematuria.   Musculoskeletal:  Negative for joint swelling and neck stiffness.   Skin:  Negative for color change and rash.   Neurological:  Negative for seizures and syncope.   Hematological:  Negative for adenopathy.        No obvious bleeding   Psychiatric/Behavioral:  Negative for agitation, confusion and hallucinations.          OBJECTIVE:    Vitals:    01/22/24 1148   BP: 105/63   Pulse: 60   Resp: 18   Temp: 98 °F (36.7 °C)   TempSrc: Infrared   SpO2: 94%   Weight: 60.8 kg (134 lb)   Height: 152.4 cm (60\")   PainSc:   8   PainLoc: Generalized           Body mass index is 26.17 kg/m².    ECOG    (1) Restricted in physically strenuous activity, ambulatory and able to do work of light nature    Physical Exam  Vitals and nursing note reviewed.   Constitutional:       General: She is not in acute distress.     Appearance: Normal appearance. She is not diaphoretic.   HENT:      Head: Normocephalic and atraumatic.      Mouth/Throat:      Mouth: Mucous membranes are moist.      Pharynx: Oropharynx is clear.   Eyes:      General: No scleral icterus.        Right eye: No discharge.         Left eye: No discharge.      Extraocular Movements: Extraocular movements intact.      Conjunctiva/sclera: Conjunctivae normal.   Neck:      Thyroid: No thyromegaly.   Cardiovascular:      Rate and Rhythm: Normal rate and regular rhythm.      Pulses: Normal pulses.      Heart sounds: Normal heart sounds.      No friction rub. No gallop.   Pulmonary:      " Effort: Pulmonary effort is normal. No respiratory distress.      Breath sounds: Normal breath sounds. No stridor. No wheezing.   Abdominal:      General: Bowel sounds are normal. There is distension.      Palpations: Abdomen is soft. There is no mass.      Tenderness: There is abdominal tenderness. There is guarding (Left upper abdomen). There is no rebound.   Musculoskeletal:         General: No tenderness. Normal range of motion.      Cervical back: Normal range of motion and neck supple.      Right lower leg: No edema.      Left lower leg: No edema.      Comments: Neck pain.  Patient has a neck collar.   Lymphadenopathy:      Cervical: No cervical adenopathy.   Skin:     General: Skin is warm and dry.      Capillary Refill: Capillary refill takes less than 2 seconds.      Findings: No bruising, erythema or rash.   Neurological:      Mental Status: She is alert and oriented to person, place, and time.      Cranial Nerves: No cranial nerve deficit.      Sensory: No sensory deficit.      Motor: No abnormal muscle tone.   Psychiatric:         Mood and Affect: Mood normal.         Behavior: Behavior normal.         Thought Content: Thought content normal.         Judgment: Judgment normal.       I have reexamined the patient and the results are consistent with the previously documented exam. Мария Lian Nunez MD          RECENT LABS    WBC   Date Value Ref Range Status   01/22/2024 0.87 (C) 3.40 - 10.80 10*3/mm3 Final     RBC   Date Value Ref Range Status   01/22/2024 3.25 (L) 3.77 - 5.28 10*6/mm3 Final     Hemoglobin   Date Value Ref Range Status   01/22/2024 9.7 (L) 12.0 - 15.9 g/dL Final     Hematocrit   Date Value Ref Range Status   01/22/2024 31.3 (L) 34.0 - 46.6 % Final     MCV   Date Value Ref Range Status   01/22/2024 96.3 79.0 - 97.0 fL Final     MCH   Date Value Ref Range Status   01/22/2024 29.8 26.6 - 33.0 pg Final     MCHC   Date Value Ref Range Status   01/22/2024 31.0 (L) 31.5 - 35.7 g/dL Final      RDW   Date Value Ref Range Status   01/22/2024 15.2 12.3 - 15.4 % Final     RDW-SD   Date Value Ref Range Status   01/22/2024 52.4 37.0 - 54.0 fl Final     MPV   Date Value Ref Range Status   01/22/2024 9.5 6.0 - 12.0 fL Final     Platelets   Date Value Ref Range Status   01/22/2024 65 (L) 140 - 450 10*3/mm3 Final     Neutrophil %   Date Value Ref Range Status   01/22/2024 58.7 42.7 - 76.0 % Final     Lymphocyte %   Date Value Ref Range Status   01/22/2024 29.9 19.6 - 45.3 % Final     Monocyte %   Date Value Ref Range Status   01/22/2024 9.2 5.0 - 12.0 % Final     Eosinophil %   Date Value Ref Range Status   01/22/2024 1.1 0.3 - 6.2 % Final     Basophil %   Date Value Ref Range Status   01/22/2024 1.1 0.0 - 1.5 % Final     Immature Grans %   Date Value Ref Range Status   07/20/2020 0.7 (H) 0.0 - 0.5 % Final     Neutrophils, Absolute   Date Value Ref Range Status   01/22/2024 0.51 (L) 1.70 - 7.00 10*3/mm3 Final     Lymphocytes, Absolute   Date Value Ref Range Status   01/22/2024 0.26 (L) 0.70 - 3.10 10*3/mm3 Final     Monocytes, Absolute   Date Value Ref Range Status   01/22/2024 0.08 (L) 0.10 - 0.90 10*3/mm3 Final     Eosinophils, Absolute   Date Value Ref Range Status   01/22/2024 0.01 0.00 - 0.40 10*3/mm3 Final     Basophils, Absolute   Date Value Ref Range Status   01/22/2024 0.01 0.00 - 0.20 10*3/mm3 Final     Immature Grans, Absolute   Date Value Ref Range Status   07/20/2020 0.05 0.00 - 0.05 10*3/mm3 Final     nRBC   Date Value Ref Range Status   12/01/2023 0.1 0.0 - 0.2 /100 WBC Final       Lab Results   Component Value Date    GLUCOSE 88 12/18/2023    BUN 11 12/18/2023    CREATININE 0.87 12/18/2023    EGFRIFNONA 70 12/20/2021    EGFRIFAFRI >60 10/12/2018    BCR 12.6 12/18/2023    K 4.4 12/18/2023    CO2 25.0 12/18/2023    CALCIUM 9.6 12/18/2023    PROTENTOTREF 7.4 12/14/2020    ALBUMIN 4.2 12/18/2023    LABIL2 0.9 12/14/2020    AST 31 12/18/2023    ALT 16 12/18/2023       ASSESSMENT:    Metastatic breast  cancer presenting as 1.1 cm mixed lytic/sclerotic hypermetabolic lesion in the left hemisacrum suspicious for metastatic disease 1.2 cm sclerotic lesion on L4, small L3 lesion and T11 lesion.  Tumor is ER positive, OR positive and HER-2/bishop negative.  Patient was prescribed combination of Ibrance and Arimidex.  Continued Ibrance due to pulmonary toxicity.  She was then placed on single agent Arimidex.  Upon progression on Arimidex was switched to Faslodex.  She developed L1 vertebral body progressive disease on Faslodex for that reason we will discontinue Faslodex and begin combination of Aromasin and Afinitor.  Patient took Afinitor for 3 days and discontinued due to nausea and fatigue.  [id number] does not show any actionable mutation.  She does not have any soft tissue for us to biopsy for additional NGS testing  We will discontinue Afinitor from her regimen and offer patient abemaciclib stable along with Aromasin.  We reviewed that abemaciclib can lead to pancytopenia, diarrhea fatigue interstitial pneumonitis.  She was given information as well to review and we will plan to start her soon as medication has been approved by her insurance  She is currently on Aromasin and abemaciclib stable.  Will continue the same  Abemaciclib induced diarrhea: Patient will continue to use Imodium.  This is helping  Fatigue secondary to abemaciclib stable   Thrombocytopenia due to abemaciclib  Bone metastasis:.  Biphosphonate's has been recommended patient has not been able to have due to ongoing dental issues  Right foot swelling: Doppler study was negative  History of medical noncompliance  Pancytopenia secondary to Ibrance: Improved off Ibrance  L1 vertebral body involvement.  Currently undergoing XRT.  Patient is also established with Dr. Ruff.  MRI of the spine has been scheduled for October 2023  Pain management  ypT2 N1 aM0 status post left modified radical mastectomy with lymph node dissection and right prophylactic  mastectomy in 2017 performed at [facility].  ER positive, [place] positive and HER-2/bishop negative.  Status post bilateral chest wall reconstruction.  According to patient, she tolerated Arimidex very well except that she also had osteoporosis therefore Arimidex was discontinued at that time  Intolerance of tamoxifen in the past  Osteoporosis: We will transition to Xgeva since she has bone metastases  Status post neoadjuvant Arimidex prior to bilateral mastectomies  Thrombocytopenia: Work-up was - December 2020  Neck pain status post cervical spine surgery: Resolved  Complex cardiac medical history including tetralogy of Fallot, coarctation of aorta, VSD status post repair.  Status post stent placement for coarctation of aorta  Personal history and strong family history of breast cancer in multiple in the relatives on paternal side of the family including 4 paternal aunts in their 30s and 40s and 2 maternal aunts at age of 20s and 50s.  There is concern for possible hereditary breast cancer syndrome.  Patient may be  interested in pursuing cancer genetics for her management.  Assessment has been reviewed and updated          Discussion    Patient has metastatic breast cancer estrogen and progesterone dependent and HER-2/bishop negative.  She is currently on Arimidex.  We will add Ibrance.  We will also discontinue Prolia and begin Xgeva to help reduce skeletal events.           Plans:     Reviewed her recent bone scan, CT scan chest abdomen and pelvis.  Will be due for CT of the chest and bone scan in January 2024.  Reviewed and both are without evidence of progressive disease.  She had a CT abdomen and pelvis October 2023  Continue combination of Aromasin 25 mg p.o. daily and abemaciclib stable.  Continue to increase her p.o. fluids and use Imodium as needed  She will be given information today on abemaciclib stable  Pancytopenia is due to Verzenio  [facility] pharmacist follow-up with her next week  Checked  tumor markers for CEA CA 15-3 and CA 27.29 reviewed  She has completed  palliative XRT to her lumbar spine  Follow-up with Dr. Ruff with neurosurgical services  Guardant 360 for NGS testing was negative for any actionable mutations  Follow-up with pain management with Dr. Hunter  Guardian 360 does not show any actionable mutation.  Would consider soft tissue biopsy at time of progression for additional NGS testing.  Reviewed with patient  Referred to pulmonary for her dyspnea: Dr. Julio.  Appointment is pending  Follow-up with pain management for ongoing pain issues  Follow-up with /counselor   Reviewed work-up for thrombocytopenia which was negative  Reviewed her bone density which showed osteoporosis  Schedule Xgeva 120 mg subcu monthly once her dental procedures are completed.  She is still waiting on a ida  All questions answered  Follow-up 4 weeks  All questions answered            Patient verbalized understanding and is in agreement of the above plan.         I spent 30 total minutes, face-to-face, caring for Gladys today. 90% of this time involved counseling and/or coordination of care as documented within this note.

## 2024-01-22 ENCOUNTER — LAB (OUTPATIENT)
Dept: LAB | Facility: HOSPITAL | Age: 64
End: 2024-01-22
Payer: MEDICARE

## 2024-01-22 ENCOUNTER — TELEPHONE (OUTPATIENT)
Dept: CARDIOLOGY | Facility: CLINIC | Age: 64
End: 2024-01-22

## 2024-01-22 ENCOUNTER — OFFICE VISIT (OUTPATIENT)
Dept: ONCOLOGY | Facility: CLINIC | Age: 64
End: 2024-01-22
Payer: MEDICARE

## 2024-01-22 ENCOUNTER — OFFICE VISIT (OUTPATIENT)
Dept: CARDIOLOGY | Facility: CLINIC | Age: 64
End: 2024-01-22
Payer: MEDICARE

## 2024-01-22 ENCOUNTER — CLINICAL SUPPORT NO REQUIREMENTS (OUTPATIENT)
Dept: CARDIOLOGY | Facility: CLINIC | Age: 64
End: 2024-01-22
Payer: MEDICARE

## 2024-01-22 VITALS
DIASTOLIC BLOOD PRESSURE: 63 MMHG | WEIGHT: 134 LBS | HEART RATE: 60 BPM | OXYGEN SATURATION: 94 % | BODY MASS INDEX: 26.31 KG/M2 | TEMPERATURE: 98 F | SYSTOLIC BLOOD PRESSURE: 105 MMHG | HEIGHT: 60 IN | RESPIRATION RATE: 18 BRPM

## 2024-01-22 VITALS
WEIGHT: 135 LBS | BODY MASS INDEX: 26.5 KG/M2 | SYSTOLIC BLOOD PRESSURE: 120 MMHG | HEART RATE: 60 BPM | OXYGEN SATURATION: 95 % | RESPIRATION RATE: 16 BRPM | DIASTOLIC BLOOD PRESSURE: 65 MMHG | HEIGHT: 60 IN

## 2024-01-22 DIAGNOSIS — I44.2 COMPLETE HEART BLOCK: Primary | ICD-10-CM

## 2024-01-22 DIAGNOSIS — I27.20 PULMONARY HYPERTENSION: Chronic | ICD-10-CM

## 2024-01-22 DIAGNOSIS — Q21.3 TETRALOGY OF FALLOT: Chronic | ICD-10-CM

## 2024-01-22 DIAGNOSIS — C50.919 MALIGNANT NEOPLASM OF FEMALE BREAST, UNSPECIFIED ESTROGEN RECEPTOR STATUS, UNSPECIFIED LATERALITY, UNSPECIFIED SITE OF BREAST: Primary | ICD-10-CM

## 2024-01-22 DIAGNOSIS — C79.51 MALIGNANT NEOPLASM METASTATIC TO BONE: Primary | ICD-10-CM

## 2024-01-22 DIAGNOSIS — C50.919 MALIGNANT NEOPLASM OF FEMALE BREAST, UNSPECIFIED ESTROGEN RECEPTOR STATUS, UNSPECIFIED LATERALITY, UNSPECIFIED SITE OF BREAST: ICD-10-CM

## 2024-01-22 DIAGNOSIS — Z95.0 PACEMAKER: ICD-10-CM

## 2024-01-22 LAB
ALBUMIN SERPL-MCNC: 3.8 G/DL (ref 3.5–5.2)
ALBUMIN/GLOB SERPL: 1.5 G/DL
ALP SERPL-CCNC: 68 U/L (ref 39–117)
ALT SERPL W P-5'-P-CCNC: 11 U/L (ref 1–33)
ANION GAP SERPL CALCULATED.3IONS-SCNC: 8 MMOL/L (ref 5–15)
AST SERPL-CCNC: 23 U/L (ref 1–32)
BASOPHILS # BLD AUTO: 0.01 10*3/MM3 (ref 0–0.2)
BASOPHILS NFR BLD AUTO: 1.1 % (ref 0–1.5)
BILIRUB SERPL-MCNC: 0.3 MG/DL (ref 0–1.2)
BUN SERPL-MCNC: 16 MG/DL (ref 8–23)
BUN/CREAT SERPL: 18.2 (ref 7–25)
CALCIUM SPEC-SCNC: 9 MG/DL (ref 8.6–10.5)
CHLORIDE SERPL-SCNC: 109 MMOL/L (ref 98–107)
CO2 SERPL-SCNC: 26 MMOL/L (ref 22–29)
CREAT SERPL-MCNC: 0.88 MG/DL (ref 0.57–1)
DEPRECATED RDW RBC AUTO: 52.4 FL (ref 37–54)
EGFRCR SERPLBLD CKD-EPI 2021: 74.9 ML/MIN/1.73
EOSINOPHIL # BLD AUTO: 0.01 10*3/MM3 (ref 0–0.4)
EOSINOPHIL NFR BLD AUTO: 1.1 % (ref 0.3–6.2)
ERYTHROCYTE [DISTWIDTH] IN BLOOD BY AUTOMATED COUNT: 15.2 % (ref 12.3–15.4)
GLOBULIN UR ELPH-MCNC: 2.5 GM/DL
GLUCOSE SERPL-MCNC: 123 MG/DL (ref 65–99)
HCT VFR BLD AUTO: 31.3 % (ref 34–46.6)
HGB BLD-MCNC: 9.7 G/DL (ref 12–15.9)
HOLD SPECIMEN: NORMAL
HOLD SPECIMEN: NORMAL
LYMPHOCYTES # BLD AUTO: 0.26 10*3/MM3 (ref 0.7–3.1)
LYMPHOCYTES NFR BLD AUTO: 29.9 % (ref 19.6–45.3)
MCH RBC QN AUTO: 29.8 PG (ref 26.6–33)
MCHC RBC AUTO-ENTMCNC: 31 G/DL (ref 31.5–35.7)
MCV RBC AUTO: 96.3 FL (ref 79–97)
MONOCYTES # BLD AUTO: 0.08 10*3/MM3 (ref 0.1–0.9)
MONOCYTES NFR BLD AUTO: 9.2 % (ref 5–12)
NEUTROPHILS NFR BLD AUTO: 0.51 10*3/MM3 (ref 1.7–7)
NEUTROPHILS NFR BLD AUTO: 58.7 % (ref 42.7–76)
PLATELET # BLD AUTO: 65 10*3/MM3 (ref 140–450)
PMV BLD AUTO: 9.5 FL (ref 6–12)
POTASSIUM SERPL-SCNC: 4.3 MMOL/L (ref 3.5–5.2)
PROT SERPL-MCNC: 6.3 G/DL (ref 6–8.5)
RBC # BLD AUTO: 3.25 10*6/MM3 (ref 3.77–5.28)
SODIUM SERPL-SCNC: 143 MMOL/L (ref 136–145)
WBC NRBC COR # BLD AUTO: 0.87 10*3/MM3 (ref 3.4–10.8)

## 2024-01-22 PROCEDURE — 1159F MED LIST DOCD IN RCRD: CPT | Performed by: STUDENT IN AN ORGANIZED HEALTH CARE EDUCATION/TRAINING PROGRAM

## 2024-01-22 PROCEDURE — 93000 ELECTROCARDIOGRAM COMPLETE: CPT | Performed by: STUDENT IN AN ORGANIZED HEALTH CARE EDUCATION/TRAINING PROGRAM

## 2024-01-22 PROCEDURE — 85025 COMPLETE CBC W/AUTO DIFF WBC: CPT

## 2024-01-22 PROCEDURE — 36415 COLL VENOUS BLD VENIPUNCTURE: CPT

## 2024-01-22 PROCEDURE — 3078F DIAST BP <80 MM HG: CPT | Performed by: STUDENT IN AN ORGANIZED HEALTH CARE EDUCATION/TRAINING PROGRAM

## 2024-01-22 PROCEDURE — 93281 PM DEVICE PROGR EVAL MULTI: CPT | Performed by: NURSE PRACTITIONER

## 2024-01-22 PROCEDURE — 99214 OFFICE O/P EST MOD 30 MIN: CPT | Performed by: STUDENT IN AN ORGANIZED HEALTH CARE EDUCATION/TRAINING PROGRAM

## 2024-01-22 PROCEDURE — 3074F SYST BP LT 130 MM HG: CPT | Performed by: STUDENT IN AN ORGANIZED HEALTH CARE EDUCATION/TRAINING PROGRAM

## 2024-01-22 PROCEDURE — 1160F RVW MEDS BY RX/DR IN RCRD: CPT | Performed by: STUDENT IN AN ORGANIZED HEALTH CARE EDUCATION/TRAINING PROGRAM

## 2024-01-22 PROCEDURE — 3074F SYST BP LT 130 MM HG: CPT | Performed by: INTERNAL MEDICINE

## 2024-01-22 PROCEDURE — 99214 OFFICE O/P EST MOD 30 MIN: CPT | Performed by: INTERNAL MEDICINE

## 2024-01-22 PROCEDURE — 1125F AMNT PAIN NOTED PAIN PRSNT: CPT | Performed by: INTERNAL MEDICINE

## 2024-01-22 PROCEDURE — 3078F DIAST BP <80 MM HG: CPT | Performed by: INTERNAL MEDICINE

## 2024-01-22 PROCEDURE — 80053 COMPREHEN METABOLIC PANEL: CPT | Performed by: INTERNAL MEDICINE

## 2024-01-22 NOTE — TELEPHONE ENCOUNTER
PER DR ALMAZAN'S REQUEST, I LEFT PT A VM TO SEE IF SHE CAN COME IN EARLIER. EITHER BEFORE 12PM OR AT 1PM. ASKED HER TO CALL BACK REGARDLESS.

## 2024-01-22 NOTE — PROGRESS NOTES
"      Subjective:     Encounter Date:01/22/2024      Patient ID: Gladys Garrison is a 63 y.o. female.    Chief Complaint:  Chief Complaint   Patient presents with    Coronary Artery Disease    Hypertension    Hyperlipidemia    Pacemaker Check    Follow-up       HPI:    63-year-old female with a past medical history of tetralogy of Fallot with surgically replaced VSD at [facility], complete heart block status post dual-chamber pacemaker, diabetes mellitus type 2, valvular heart disease with tetralogy of Fallot including tricuspid/pulmonic valve abnormalities, right ventricular dilatation/dysfunction with significant tricuspid regurgitation presents for follow-up.    This is a patient of Dr. Lopez    Also carries a diagnosis of metastatic breast cancer with bone metastasis with history of bilateral mastectomy.  She follows with oncology for long-term management.    She recently had a CT scan  which showed enlarged pulmonary artery and she had questions about the same.  She does endorse dyspnea on exertion however she tries to power through it without stopping and carries on with day-to-day activities.  She does not appear to be fluid overloaded on exam.  Had her device check with Kourtney Flores today without evidence of sustained arrhythmias.      Objective:         /65 (BP Location: Right arm, Patient Position: Sitting, Cuff Size: Adult)   Pulse 60   Resp 16   Ht 152.4 cm (60\")   Wt 61.2 kg (135 lb)   LMP  (LMP Unknown)   SpO2 95%   BMI 26.37 kg/m²     Physical exam  General-no acute distress  CVS-S1-S2 normal, no murmur  Respiratory-clear breath sounds  GI-soft nontender abdomen  No pedal edema  Alert oriented X3    ROS:  14 point review of systems negative except as mentioned above    Current Outpatient Medications:     Blood Glucose Monitoring Suppl (Accu-Chek Araceli) device, Use as instructed   To test blood sugar bid, Disp: 1 each, Rfl: 0    Calcium Carbonate-Vit D-Min (Calcium 600+D Plus " "Minerals) 600-400 MG-UNIT chewable tablet, Chew 600 mg 2 (Two) Times a Day., Disp: 60 each, Rfl: 6    Continuous Blood Gluc  (FreeStyle Rajeev 14 Day [place]) device, 1 each Daily., Disp: 1 each, Rfl: 0    Continuous Blood Gluc Sensor (FreeStyle Rajeev 14 Day Sensor) misc, 1 each Daily., Disp: 6 each, Rfl: 3    cyclobenzaprine (FLEXERIL) 10 MG tablet, Take 1 tablet by mouth 2 (Two) Times a Day As Needed for Muscle Spasms., Disp: 30 tablet, Rfl: 1    exemestane (AROMASIN) 25 MG tablet, Take 1 tablet by mouth Daily., Disp: , Rfl:     famotidine (PEPCID) 20 MG tablet, Take 1 tablet by mouth 2 (Two) Times a Day As Needed for Heartburn., Disp: , Rfl:     insulin NPH-insulin regular (humuLIN 70/30,novoLIN 70/30) (70-30) 100 UNIT/ML injection, Inject 5 Units under the skin into the appropriate area as directed Every Evening. If BS over 180, Disp: , Rfl:     Insulin Pen Needle (B-D UF III MINI PEN NEEDLES) 31G X 5 MM misc, Use to inject insulin twice daily   DX: E11.9, Disp: 100 each, Rfl: 0    Insulin Syringe-Needle U-100 28G X 1/2\" 1 ML misc, 1 each 2 (Two) Times a Day., Disp: 100 each, Rfl: 6    losartan (Cozaar) 50 MG tablet, Take 1 tablet by mouth Daily. Discontinue the lisinopril due to interaction with new chemotherapy, Disp: 90 tablet, Rfl: 1    Morphine Sulfate, PF, 8 MG/ML solution, 1 mL by Intrathecal Infusion route Daily. Receives 8 mg through pain pump q 24 hrs, Disp: , Rfl:     rosuvastatin (Crestor) 20 MG tablet, Take 1 tablet by mouth Every Night., Disp: 90 tablet, Rfl: 0    Problem List:  Patient Active Problem List   Diagnosis    Hyperlipidemia    Hypertensive disorder    Osteoarthritis of multiple joints    Pulmonary hypertension    Pulmonary valve disorder    Malignant tumor of breast    Tetralogy of Fallot    Tricuspid valve regurgitation    Controlled type 2 diabetes mellitus without complication, with long-term current use of insulin    Vitamin D deficiency    Acquired spondylolisthesis of " cervical vertebra    Adjacent segment disease with spinal stenosis    Cervical spondylosis with myelopathy    Cervical myelopathy with cervical radiculopathy    Osteoporosis    S/P cervical spinal fusion    Lesion of lumbar spine    Atherosclerosis of coronary artery of native heart without angina pectoris    Rib pain on right side    Memory loss of unknown cause    Malignant neoplasm of female breast    Malignant neoplasm metastatic to bone    Supraventricular tachycardia    Thrombocytopenia    Systolic congestive heart failure    COVID-19    Elevated AST (SGOT)    Hyponatremia    Pacemaker    Sick sinus syndrome    AV block, complete    Cancer associated pain    Dyspnea    Pain in left knee    Abnormal radiographic examination    Acute sinusitis    Allergic rhinitis    Constipation    Diarrhea    Nausea    Disorder of aorta    Dizziness and giddiness    Headache    Lack of energy    Syncope    Pacemaker complications    Fall against object    Complete heart block     Past Medical History:  Past Medical History:   Diagnosis Date    Allergic     Bone cancer     METASTATIC BONE    Bradycardia     SECONDARY TO ABLATION    Breast cancer 2017    mets to lymph nodes; did not do radiation    Cancer of unknown origin     Compression fx, thoracic spine, open, initial encounter     Coronary artery disease     COVID-19 09/01/2021    Diabetes mellitus     Heart disease, unspecified     Hepatitis C     RESOLVED WITH MEDICATION    Hyperlipidemia     Hypertension     Obesity (BMI 30-39.9) 02/05/2021    Rib fracture     Per patient    Sleep apnea     no machine    Tachycardia     ATRIAL    Type 2 diabetes mellitus 11/2017     Past Surgical History:  Past Surgical History:   Procedure Laterality Date    BACK SURGERY      neck X 2    BREAST RECONSTRUCTION Bilateral     CARDIAC ABLATION       atrial tachycardia x 5 ablations     CARDIAC CATHETERIZATION      CARDIAC ELECTROPHYSIOLOGY PROCEDURE N/A 12/11/2023    Procedure: Pacemaker RV  lead insertion with generator change out. [employer];  Surgeon: Steven Hammond MD;  Location: [facility] CATH INVASIVE LOCATION;  Service: Cardiovascular;  Laterality: N/A;    CARDIAC SURGERY      stent placed in aorta    CARDIAC SURGERY      6 surgeries as baby     CERVICAL FUSION ANTERIOR WITH ARTIFICIAL DISCECTOMY IMPLANTATION N/A 2020    Procedure: C4 VERTEBRECTOMY AND ANTERIOR CERIVCAL DISCECTOMY WITH FUSION OF CERVICAL THREE THROUGH FIVE WITH REMOVAL OF HARDWARE C5-C6;  Surgeon: Mark Kaba MD;  Location: [facility] MAIN OR;  Service: Neurosurgery;  Laterality: N/A;     SECTION      x2    COLONOSCOPY N/A 10/23/2020    Procedure: COLONOSCOPY WITH POLYPECTOMY X6;  Surgeon: Stanley Bourne MD;  Location: [facility] ENDOSCOPY;  Service: Gastroenterology;  Laterality: N/A;  POLYPS, INTERNAL HEMORRHOIDS    ENDOMETRIAL ABLATION      REMOVAL SCAR TISSUE UTERINE    ENDOSCOPY N/A 10/23/2020    Procedure: ESOPHAGOGASTRODUODENOSCOPY WITH BIOPSY X 1 AREA;  Surgeon: Stanley Bourne MD;  Location: [facility] ENDOSCOPY;  Service: Gastroenterology;  Laterality: N/A;  GASTRITIS, ESOPHAGITIS, HIATAL HERNIA    INSERT / REPLACE / REMOVE PACEMAKER      KNEE SURGERY      MASTECTOMY Bilateral     NECK SURGERY      PACEMAKER IMPLANTATION      PAIN PUMP INSERTION/REVISION N/A 2022    Procedure: PAIN PUMP INSERTION AND INTRATHECAL CATHETER PLACEMENT;  Surgeon: Chuck Hunter MD;  Location: [facility] MAIN OR;  Service: Pain Management;  Laterality: N/A;     Social History:  Social History     Socioeconomic History    Marital status: Legally     Number of children: 2   Tobacco Use    Smoking status: Never     Passive exposure: Never    Smokeless tobacco: Never   Vaping Use    Vaping Use: Never used   Substance and Sexual Activity    Alcohol use: Not Currently     Comment: drinks rarely    Drug use: Never    Sexual activity: Defer     Allergies:  Allergies   Allergen Reactions    Oxycodone  Nausea And Vomiting    Promethazine Other (See Comments)     Hyperactive mean    Tape Other (See Comments)     .blisters       Immunizations:  Immunization History   Administered Date(s) Administered    COVID-19 (FARTUN) 05/24/2021    Tdap 02/25/2022            In-Office Procedure(s):  No orders to display        ASCVD RIsk Score::  The ASCVD Risk score (Kyle SHANKAR, et al., 2019) failed to calculate for the following reasons:    The patient has a prior MI or stroke diagnosis    Imaging:    Results for orders placed during the hospital encounter of 12/11/23    XR Chest 1 View    Narrative  XR CHEST 1 VW    Date of Exam: 12/11/2023 5:28 PM EST    Indication: Post ICD / Pacer Implant    Comparison: Chest radiograph 12/1/2023. Chest CT 8/7/2023    Findings:  Prior sternotomy. Multichamber AICD. Stent graft in the descending thoracic aorta similar in position to prior exam. Prior CABG. At least moderate cardiomegaly similar to prior exam. Prominence of the central vasculature without overt edema similar to  prior exam. Negative for lobar infiltrate, large effusion or pneumothorax. Chronic appearing right posterior rib fractures. Partially imaged cervical fusion hardware. See prior cross-sectional imaging for evaluation of known osteoblastic disease.    Impression  Impression:  Multichamber AICD, without pneumothorax or convincing active airspace process. Similar cardiomegaly and prominence of the central vasculature compared to 12/1/2023 radiograph.      Electronically Signed: Jeferson Diaz MD  12/11/2023 5:54 PM EST  Workstation ID: [id number]       Results for orders placed during the hospital encounter of 01/08/24    CT Chest With Contrast Diagnostic    Narrative  CT CHEST W CONTRAST DIAGNOSTIC    Date of Exam: 1/8/2024 7:48 AM EST    Indication: breast cancer.    Comparison: 12/11/2023 and 8/7/2023    Technique: Axial CT images were obtained of the chest after the uneventful intravenous administration of iodinated  contrast.  Sagittal and coronal reconstructions were performed.  Automated exposure control and iterative reconstruction methods were used.      Structured Dose report sent to the ACR Radiation Dose Index Registry.    The patient has had 2 known prior CTs and cardiac nuclear medicine studies within the last 12 months..    This CT exam was performed using one or more of the following dose reduction techniques: Automated exposure control, adjustment of the mA and/or kV according to patient size, or use of iterative reconstruction technique.      Findings: Bilateral breast implants. Median sternotomy wires with mediastinal surgical clips. Cardiac pacemaker/defibrillator leads. Normal appearance of the thyroid. No mediastinal adenopathy. Severe enlargement of the main pulmonary artery suggesting  pulmonary arterial hypertension. Stable calcified 0.8 cm nodule noted in the right lower lobe most consistent with a granuloma. Calcified mediastinal lymph nodes also appear stable. Stable 0.3 cm granuloma within the right upper lobe. Stable 0.3 cm  calcified granuloma within the right upper lobe on axial image #27. No consolidation. No pneumothorax or pleural effusion.    Limited evaluation of the upper abdomen appears normal.    Stable appearance of T12 and L1 compared with the prior study. No lytic or blastic disease is definitively identified.    Impression  Stable appearance of the chest.    Electronically Signed: Sudhir Montoya MD  1/8/2024 10:07 PM EST  Workstation ID: [id number]    Study is available in DICOM format to non-affiliated external healthcare facilities or entities on a secure, media free, reciprocally searchable basis with patient authorization for at least a 12 month period after the study. Search conducted for prior  patient CT studies completed at nonaffiliated external healthcare facilities or entities within the past 12 months and are available through a secure, authorized, media free, shared archive prior  to an imaging study being performed.      Results for orders placed during the hospital encounter of 01/08/24    CT Chest With Contrast Diagnostic    Narrative  CT CHEST W CONTRAST DIAGNOSTIC    Date of Exam: 1/8/2024 7:48 AM EST    Indication: breast cancer.    Comparison: 12/11/2023 and 8/7/2023    Technique: Axial CT images were obtained of the chest after the uneventful intravenous administration of iodinated contrast.  Sagittal and coronal reconstructions were performed.  Automated exposure control and iterative reconstruction methods were used.      Structured Dose report sent to the ACR Radiation Dose Index Registry.    The patient has had 2 known prior CTs and cardiac nuclear medicine studies within the last 12 months..    This CT exam was performed using one or more of the following dose reduction techniques: Automated exposure control, adjustment of the mA and/or kV according to patient size, or use of iterative reconstruction technique.      Findings: Bilateral breast implants. Median sternotomy wires with mediastinal surgical clips. Cardiac pacemaker/defibrillator leads. Normal appearance of the thyroid. No mediastinal adenopathy. Severe enlargement of the main pulmonary artery suggesting  pulmonary arterial hypertension. Stable calcified 0.8 cm nodule noted in the right lower lobe most consistent with a granuloma. Calcified mediastinal lymph nodes also appear stable. Stable 0.3 cm granuloma within the right upper lobe. Stable 0.3 cm  calcified granuloma within the right upper lobe on axial image #27. No consolidation. No pneumothorax or pleural effusion.    Limited evaluation of the upper abdomen appears normal.    Stable appearance of T12 and L1 compared with the prior study. No lytic or blastic disease is definitively identified.    Impression  Stable appearance of the chest.    Electronically Signed: Sudhir Montoya MD  1/8/2024 10:07 PM EST  Workstation ID: [id number]    Study is available in DICOM  format to non-affiliated external healthcare facilities or entities on a secure, media free, reciprocally searchable basis with patient authorization for at least a 12 month period after the study. Search conducted for prior  patient CT studies completed at nonaffiliated external healthcare facilities or entities within the past 12 months and are available through a secure, authorized, media free, shared archive prior to an imaging study being performed.        Most recent EKG as reviewed and interpreted by me:    ECG 12 Lead    Date/Time: 1/22/2024 1:55 PM  Performed by: Regina Edwards MD    Authorized by: Regina Edwards MD  Comparison: compared with previous ECG   Similar to previous ECG  Rhythm: paced  Comments: A paced V paced           Most recent echo as reviewed and interpreted by me:  Results for orders placed during the hospital encounter of 03/07/23    Adult Transthoracic Echo Complete W/ Cont if Necessary Per Protocol    Interpretation Summary    Left ventricular systolic function is normal. Left ventricular ejection fraction appears to be 56 - 60%.    Left ventricular diastolic function is consistent with (grade Ia w/high LAP) impaired relaxation.    Severely reduced right ventricular systolic function noted.    The right ventricular cavity is moderately dilated.    The right atrial cavity is moderate to severely  dilated.    There is moderate, bileaflet mitral valve thickening present.    Severe tricuspid valve regurgitation is present.    Estimated right ventricular systolic pressure from tricuspid regurgitation is mildly elevated (35-45 mmHg).    Mild pulmonary hypertension is present.    There is a trivial pericardial effusion. There is no evidence of cardiac tamponade.          Assessment & Plan :       Dyspnea on exertion  RV dilatation/dysfunction  Pulmonary hypertension  Significant tricuspid valve regurgitation  History of tetralogy of Fallot with surgical repair at [facility]    Plan  -  Does not appear overtly fluid overload, consider adding low-dose diuretic on follow-up  - Advised to establish care with congenital heart disease specialist for long-term management given significant prior history    Hypertension  Continue losartan 50 mg daily    History of complete heart block status post dual-chamber pacemaker  Device checks with Kourtney Flores    History of breast cancer  Follows with oncology      Part of this note may be an electronic transcription/translation of spoken language to printed text using the Dragon Dictation System.    Regina Edwards MD  01/22/24  .

## 2024-01-23 NOTE — PROGRESS NOTES
DEVICE INTERROGATION:  IN OFFICE    DEVICE TYPE: Biventricular pacemaker    :   [employer]    BATTERY:  Stable    TIME TO ELECTIVE REPLACEMENT INDICATORS:   8.9 years    CHARGE TIME:   Not applicable        LEAD DATA:       DEVICE REPROGRAMMED FOR TESTING      Atrial:   0.4 mV, 323 ohms, 1.0 V@0.4 ms    Ventricular:     Paced mV, 361 ohms, 1.0 V@0.4 ms    LV: Paced, 437 ohms, 0.75 to 0.4 ms      Pacemaker dependent:   Yes      Atrial pacing percentage: 67%    Ventricular pacing percentage: 99.8%      Arrhythmia Logbook Reviewed: No true A-fib observed.'s periods of far field sensing on the atrial channel and appropriately being documented as a mode switch        Summary:    Stable Device Function    No significant arhythmia burden.     Battery status is stable.    Reviewed with: Dr. Edwards      NEXT IN OFFICE DEVICE CHECK DUE: 6 months    REMOTE DEVICE INTERROGATIONS: Ongoing

## 2024-01-26 ENCOUNTER — SPECIALTY PHARMACY (OUTPATIENT)
Dept: PHARMACY | Facility: HOSPITAL | Age: 64
End: 2024-01-26
Payer: MEDICARE

## 2024-01-26 DIAGNOSIS — C79.51 MALIGNANT NEOPLASM METASTATIC TO BONE: Primary | ICD-10-CM

## 2024-01-26 NOTE — PROGRESS NOTES
Specialty Pharmacy Note: Verzenio (abemaciclib)--ON HOLD    Gladys Garrison is a 63 y.o. female with metastatic breast cancer was seen 1/22/24 by Dr. Nunez. Per provider due to pancytopenia will hold Verzenio for 1 week and check cbc.  Contacted patient via telephone and she is holding medication since 1/19/24 due to running out of medication.  Gave patient pharmacy information to call and she will schedule delivery today for Monday.  .  Labs Review: The CMP and CBC from 1/22/24 have been reviewed. For hematologic toxicity the package insert suggests: ithhold abemaciclib until toxicity resolves to = grade 2 (no abemaciclib dosage reduction is necessary).     Specialty pharmacy will continue to follow patient.    Eda HIRSCH, PharmD    1/26/2024  15:02 EST

## 2024-01-29 ENCOUNTER — LAB (OUTPATIENT)
Dept: LAB | Facility: HOSPITAL | Age: 64
End: 2024-01-29
Payer: MEDICARE

## 2024-01-29 DIAGNOSIS — C79.51 MALIGNANT NEOPLASM METASTATIC TO BONE: ICD-10-CM

## 2024-01-29 LAB
BASOPHILS # BLD AUTO: 0.01 10*3/MM3 (ref 0–0.2)
BASOPHILS NFR BLD AUTO: 0.6 % (ref 0–1.5)
DEPRECATED RDW RBC AUTO: 53.4 FL (ref 37–54)
EOSINOPHIL # BLD AUTO: 0.01 10*3/MM3 (ref 0–0.4)
EOSINOPHIL NFR BLD AUTO: 0.6 % (ref 0.3–6.2)
ERYTHROCYTE [DISTWIDTH] IN BLOOD BY AUTOMATED COUNT: 16 % (ref 12.3–15.4)
HCT VFR BLD AUTO: 35 % (ref 34–46.6)
HGB BLD-MCNC: 11.1 G/DL (ref 12–15.9)
LYMPHOCYTES # BLD AUTO: 0.29 10*3/MM3 (ref 0.7–3.1)
LYMPHOCYTES NFR BLD AUTO: 17.7 % (ref 19.6–45.3)
MCH RBC QN AUTO: 30.2 PG (ref 26.6–33)
MCHC RBC AUTO-ENTMCNC: 31.7 G/DL (ref 31.5–35.7)
MCV RBC AUTO: 95.4 FL (ref 79–97)
MONOCYTES # BLD AUTO: 0.16 10*3/MM3 (ref 0.1–0.9)
MONOCYTES NFR BLD AUTO: 9.8 % (ref 5–12)
NEUTROPHILS NFR BLD AUTO: 1.17 10*3/MM3 (ref 1.7–7)
NEUTROPHILS NFR BLD AUTO: 71.3 % (ref 42.7–76)
PLATELET # BLD AUTO: 76 10*3/MM3 (ref 140–450)
PMV BLD AUTO: 10.1 FL (ref 6–12)
RBC # BLD AUTO: 3.67 10*6/MM3 (ref 3.77–5.28)
WBC NRBC COR # BLD AUTO: 1.64 10*3/MM3 (ref 3.4–10.8)

## 2024-01-29 PROCEDURE — 36415 COLL VENOUS BLD VENIPUNCTURE: CPT

## 2024-01-29 PROCEDURE — 85025 COMPLETE CBC W/AUTO DIFF WBC: CPT

## 2024-01-30 ENCOUNTER — SPECIALTY PHARMACY (OUTPATIENT)
Dept: PHARMACY | Facility: HOSPITAL | Age: 64
End: 2024-01-30
Payer: MEDICARE

## 2024-01-30 NOTE — PROGRESS NOTES
Specialty Pharmacy Note: Verzenio (abemaciclib)     Latest Reference Range & Units 01/29/24 10:02   WBC 3.40 - 10.80 10*3/mm3 1.64 (L)   Neutrophils Absolute 1.70 - 7.00 10*3/mm3 1.17 (L)   (L): Data is abnormally low    Discussed lab results with Dr. Nunez and per provider, pt can resume Verzenio. Called and spoke with pt who said she has been holding for a little over a week now. Pt states she will resume medication tomorrow, 1/31/24. Pt had no other questions or concerns at this time.      Thank you,  Verona Cowart, Pharm.D.

## 2024-02-06 NOTE — NURSING NOTE
Dr. Martin marked area on pt's sacrum for bone lesion biopsy, using CT guidance, and area is being prepped in sterile fashion using chlorhexidine scrub.    Him/He

## 2024-02-07 ENCOUNTER — OFFICE VISIT (OUTPATIENT)
Dept: FAMILY MEDICINE CLINIC | Facility: CLINIC | Age: 64
End: 2024-02-07
Payer: MEDICARE

## 2024-02-07 VITALS
DIASTOLIC BLOOD PRESSURE: 76 MMHG | RESPIRATION RATE: 20 BRPM | HEART RATE: 76 BPM | SYSTOLIC BLOOD PRESSURE: 118 MMHG | OXYGEN SATURATION: 98 %

## 2024-02-07 DIAGNOSIS — R19.7 DIARRHEA, UNSPECIFIED TYPE: Primary | ICD-10-CM

## 2024-02-07 DIAGNOSIS — T80.82XS COMPLICATION OF IMMUNE EFFECTOR CELLULAR THERAPY, SEQUELA: ICD-10-CM

## 2024-02-07 RX ORDER — AZITHROMYCIN 500 MG/1
500 TABLET, FILM COATED ORAL DAILY
Qty: 3 TABLET | Refills: 0 | Status: SHIPPED | OUTPATIENT
Start: 2024-02-07

## 2024-02-07 NOTE — PROGRESS NOTES
"Chief Complaint  Diarrhea    Subjective        Gladys Garrison presents to [facility]  History of Present Illness  Gladys is a 63-year-old with breast cancer on chemotherapy who presents today with 1 day history of watery diarrhea.  States that she had 8-10 episodes last night of watery diarrhea and has been trying Imodium after every diarrhea episode.  She states that she even had some accidents with this.  Denies any fevers, chills, vomiting, diarrhea.  She has occasional diarrhea after taking her Verzenio for her breast cancer.  Denies any blood in the stool.  Denies any other sick symptoms.  She has been around her grandkids with something similar.        Objective   Vital Signs:  /76   Pulse 76   Resp 20   SpO2 98%   Estimated body mass index is 26.37 kg/m² as calculated from the following:    Height as of 1/22/24: 152.4 cm (60\").    Weight as of 1/22/24: 61.2 kg (135 lb).               Physical Exam  Constitutional:       Appearance: Normal appearance.   HENT:      Nose: Nose normal.      Mouth/Throat:      Mouth: Mucous membranes are moist.   Eyes:      Conjunctiva/sclera: Conjunctivae normal.   Cardiovascular:      Rate and Rhythm: Normal rate and regular rhythm.      Pulses: Normal pulses.      Heart sounds: Normal heart sounds. No murmur heard.  Pulmonary:      Effort: Pulmonary effort is normal. No respiratory distress.      Breath sounds: Normal breath sounds. No wheezing.   Abdominal:      General: Abdomen is flat. Bowel sounds are normal.      Palpations: Abdomen is soft. There is no mass.      Tenderness: There is abdominal tenderness (Mild tenderness throughout the whole abdomen). There is no guarding or rebound.      Hernia: No hernia is present.   Skin:     General: Skin is warm.      Capillary Refill: Capillary refill takes less than 2 seconds.   Neurological:      General: No focal deficit present.      Mental Status: She is alert.        Result " Review :    The following data was reviewed by: Guzman Vieira MD on 02/07/2024:  Common labs          12/18/2023    13:18 1/22/2024    11:30 1/29/2024    10:02   Common Labs   Glucose 88  123     BUN 11  16     Creatinine 0.87  0.88     Sodium 140  143     Potassium 4.4  4.3     Chloride 103  109     Calcium 9.6  9.0     Albumin 4.2  3.8     Total Bilirubin 0.5  0.3     Alkaline Phosphatase 77  68     AST (SGOT) 31  23     ALT (SGPT) 16  11     WBC 4.27  0.87  1.64    Hemoglobin 11.7  9.7  11.1    Hematocrit 37.4  31.3  35.0    Platelets 126  65  76      Data reviewed : Previous notes             Assessment and Plan     Diagnoses and all orders for this visit:    1. Diarrhea, unspecified type (Primary)  -     Clostridioides difficile Toxin - Stool, Per Rectum; Future  -     Gastrointestinal Panel, PCR - Stool, Per Rectum; Future    2. Complication of immune effector cellular therapy, sequela  -     Gastrointestinal Panel, PCR - Stool, Per Rectum; Future    Other orders  -     azithromycin (Zithromax) 500 MG tablet; Take 1 tablet by mouth Daily.  Dispense: 3 tablet; Refill: 0    For her diarrhea, I do have concerns about her immune modulator therapy with her Verzenio.  So I am going to be aggressive with care and get a PCR and C. difficile testing.  I am going to prophylactically treat with azithromycin for 3 days.  I emailed the pharmacist details with her heme-onc to reach out to them.  She should continue Imodium as needed after every diarrhea.  She should encourage fluid intake.         Follow Up     Return if symptoms worsen or fail to improve.  Patient was given instructions and counseling regarding her condition or for health maintenance advice. Please see specific information pulled into the AVS if appropriate.

## 2024-02-07 NOTE — LETTER
February 7, 2024     Patient: Gladys Garrison   YOB: 1960   Date of Visit: 2/7/2024       To Whom It May Concern:    It is my medical opinion that Gladsy Garrison may return to work in two days.         Sincerely,        Guzman Vieira MD    CC: No Recipients

## 2024-02-08 ENCOUNTER — TELEPHONE (OUTPATIENT)
Dept: PHARMACY | Facility: HOSPITAL | Age: 64
End: 2024-02-08
Payer: MEDICARE

## 2024-02-08 ENCOUNTER — SPECIALTY PHARMACY (OUTPATIENT)
Dept: PHARMACY | Facility: HOSPITAL | Age: 64
End: 2024-02-08
Payer: MEDICARE

## 2024-02-08 NOTE — TELEPHONE ENCOUNTER
Specialty Pharmacy Note: Abemaciclib    Patient returned pharmacy phone call and reports that she has not had any diarrhea since yesterday.  She said she is feeling a lot better today.  Asked that she call our office if she has 4 or more episodes in one day.    Thanks,    Eda HIRSCH, PharmD

## 2024-02-12 ENCOUNTER — TELEPHONE (OUTPATIENT)
Dept: FAMILY MEDICINE CLINIC | Facility: CLINIC | Age: 64
End: 2024-02-12
Payer: MEDICARE

## 2024-02-15 RX ORDER — IBUPROFEN 800 MG/1
800 TABLET ORAL EVERY 6 HOURS PRN
Qty: 90 TABLET | Refills: 0 | Status: SHIPPED | OUTPATIENT
Start: 2024-02-15

## 2024-02-20 ENCOUNTER — OFFICE VISIT (OUTPATIENT)
Dept: FAMILY MEDICINE CLINIC | Facility: CLINIC | Age: 64
End: 2024-02-20
Payer: MEDICARE

## 2024-02-20 VITALS
OXYGEN SATURATION: 97 % | WEIGHT: 130.4 LBS | SYSTOLIC BLOOD PRESSURE: 159 MMHG | HEART RATE: 81 BPM | DIASTOLIC BLOOD PRESSURE: 93 MMHG | RESPIRATION RATE: 18 BRPM | HEIGHT: 60 IN | BODY MASS INDEX: 25.6 KG/M2

## 2024-02-20 DIAGNOSIS — Z79.4 CONTROLLED TYPE 2 DIABETES MELLITUS WITHOUT COMPLICATION, WITH LONG-TERM CURRENT USE OF INSULIN: Primary | Chronic | ICD-10-CM

## 2024-02-20 DIAGNOSIS — G89.3 CANCER ASSOCIATED PAIN: ICD-10-CM

## 2024-02-20 DIAGNOSIS — I10 PRIMARY HYPERTENSION: Chronic | ICD-10-CM

## 2024-02-20 DIAGNOSIS — E11.9 CONTROLLED TYPE 2 DIABETES MELLITUS WITHOUT COMPLICATION, WITH LONG-TERM CURRENT USE OF INSULIN: Primary | Chronic | ICD-10-CM

## 2024-02-20 NOTE — LETTER
February 20, 2024     Patient: Gladys Garrison   YOB: 1960   Date of Visit: 2/20/2024       To Whom It May Concern:    Mrs. Garrison was seen in my office on 2/20.  She had a medical reason for missing work on 2/18/2024.  Please excuse her for this day.  If you have further questions please contact our office.         Sincerely,        Chirag Robles, DO

## 2024-02-20 NOTE — PROGRESS NOTES
"Chief Complaint  Chief Complaint   Patient presents with    Diabetes     Low blood sugars     Subjective        Gladys Garrison is a 63 y.o. female who presents to [facility].    History of Present Illness  Here for acute visit.   T2DM on 70/30 insulin prn, she does not do any insulin at night.  If anything she will do a little in the morning if her blood sugar is > 200.  She has freestyle chacho to help monitor her blood sugars.  Last A1C was 6.2 Sept 2023.  When her pain increases her sugar drops.  She also gets nauseous.  Zofran is only sometimes helpful.  She missed a day of work because of this episode.  She has requested an FMLA form for when episodes like this happen.      HTN on losartan 50 mg daily.    BP is elevated today.    She thinks it may be related to pain.      Objective   /93   Pulse 81   Resp 18   Ht 152.4 cm (60\")   Wt 59.1 kg (130 lb 6.4 oz)   SpO2 97%   BMI 25.47 kg/m²     Estimated body mass index is 25.47 kg/m² as calculated from the following:    Height as of this encounter: 152.4 cm (60\").    Weight as of this encounter: 59.1 kg (130 lb 6.4 oz).     Physical Exam   GEN: In no acute distress, non toxic appearing.  Tearful at times.    HEENT: Pupils equal and reactive to light, sclera clear. Mucous membranes moist.      Result Review :              Assessment and Plan     Diagnoses and all orders for this visit:    1. Controlled type 2 diabetes mellitus without complication, with long-term current use of insulin (Primary)  She has had another episode of hypoglycemia.  I discussed that she should rarely be using her insulin if at all.  We discussed healthy diet that will help keep blood sugar stable-good protein intake will be really helpful, avoid simple sugars that will create big spikes but will also fall quickly.  Continuous glucose monitor is very important for her to continue using for this reason with her frequent episodes of " hypoglycemia.    2. Primary hypertension  Blood pressure is elevated today, could be related to pain.  Continue losartan 50 mg daily.  Continue to monitor closely.    3. Cancer associated pain  Continue f/u with Dr. Hunter for pain management.         Follow Up   Has f/u scheduled in May

## 2024-02-26 ENCOUNTER — TELEPHONE (OUTPATIENT)
Dept: ONCOLOGY | Facility: CLINIC | Age: 64
End: 2024-02-26

## 2024-02-26 NOTE — TELEPHONE ENCOUNTER
Caller: Gladys Garrison    Relationship to patient: Self    Best call back number: [phone]    Chief complaint: SICK    Type of visit: LAB & F/U 1     Requested date: CALL TO R/S     If rescheduling, when is the original appointment: 2/26/2024    Additional notes:TRIED TO W/T LOST CALL

## 2024-03-04 ENCOUNTER — TELEPHONE (OUTPATIENT)
Dept: CARDIOLOGY | Facility: CLINIC | Age: 64
End: 2024-03-04
Payer: MEDICARE

## 2024-03-04 NOTE — TELEPHONE ENCOUNTER
Called and LVM     OK FOR THE HUB TO RELEASE  -------------------------------------------     Kourtney TANNER from Dr Carrasquillo and Dr Lopez's office has not received a transmission from your home monitoring device. Please check all connections on the monitor and push the button to transmit. Please contact the office if you have any questions [phone]. Thank you for your assistance.  You may also contact [employer] [phone]

## 2024-03-15 ENCOUNTER — SPECIALTY PHARMACY (OUTPATIENT)
Dept: PHARMACY | Facility: HOSPITAL | Age: 64
End: 2024-03-15
Payer: MEDICARE

## 2024-03-15 NOTE — PROGRESS NOTES
Specialty Pharmacy Note     Gladys has new insurance thru [employer]. Prior Authorization was done thru covermymeds; KEY: [id number]. It was approved for Abemaciclib (Verzenio) 150mg. Coverage is from 3/15/24 to 12/31/24.     Gregorio Grimm - CARE COORDINATOR  3/15/2024  13:50 EDT

## 2024-03-19 ENCOUNTER — OFFICE VISIT (OUTPATIENT)
Dept: CARDIOLOGY | Facility: CLINIC | Age: 64
End: 2024-03-19
Payer: MEDICARE

## 2024-03-19 VITALS
OXYGEN SATURATION: 99 % | DIASTOLIC BLOOD PRESSURE: 54 MMHG | BODY MASS INDEX: 24.94 KG/M2 | HEART RATE: 68 BPM | SYSTOLIC BLOOD PRESSURE: 167 MMHG | WEIGHT: 127 LBS | HEIGHT: 60 IN

## 2024-03-19 DIAGNOSIS — C79.51 MALIGNANT NEOPLASM METASTATIC TO BONE: ICD-10-CM

## 2024-03-19 DIAGNOSIS — I44.2 AV BLOCK, COMPLETE: Primary | ICD-10-CM

## 2024-03-19 DIAGNOSIS — I27.20 PULMONARY HYPERTENSION: Chronic | ICD-10-CM

## 2024-03-19 DIAGNOSIS — Q21.3 TETRALOGY OF FALLOT: Chronic | ICD-10-CM

## 2024-03-19 DIAGNOSIS — I36.1 NONRHEUMATIC TRICUSPID VALVE REGURGITATION: ICD-10-CM

## 2024-03-19 DIAGNOSIS — Z95.0 PACEMAKER: ICD-10-CM

## 2024-03-19 PROCEDURE — 1159F MED LIST DOCD IN RCRD: CPT | Performed by: INTERNAL MEDICINE

## 2024-03-19 PROCEDURE — 3078F DIAST BP <80 MM HG: CPT | Performed by: INTERNAL MEDICINE

## 2024-03-19 PROCEDURE — 3077F SYST BP >= 140 MM HG: CPT | Performed by: INTERNAL MEDICINE

## 2024-03-19 PROCEDURE — 99214 OFFICE O/P EST MOD 30 MIN: CPT | Performed by: INTERNAL MEDICINE

## 2024-03-19 PROCEDURE — 1160F RVW MEDS BY RX/DR IN RCRD: CPT | Performed by: INTERNAL MEDICINE

## 2024-03-19 NOTE — PROGRESS NOTES
Progress note      Name: Gladys Garrison ADMIT: (Not on file)   : 1960  PCP: Chirag Robles DO    MRN: [id number] LOS: 0 days   AGE/SEX: 63 y.o. female  ROOM: Room/bed info not found     Chief Complaint   Patient presents with    Follow-up     4 month follow up       Subjective       History of present illness  Gladys Garrison is a 63-year-old female patient was history of hypertension, dyslipidemia, pulmonary hypertension, tetralogy of Fallot, surgically repaired patient has had up to 8 open heart surgeries in her youth, she does have complete heart block and had a dual-chamber pacemaker initially implanted in .  Patient also has stage IV breast cancer metastatic to the spine.  Patient was seen in the ER in 2023, at that time oversensing was recorded on the RV channel but it was due to unipolar configuration on the sensing circuit.  It was programmed bipolar.  Pacing threshold was less than 1.  There was 8 months to TREY.  Eventually however patient underwent CRT-P upgrade with addition of a left bundle area pacing lead due to concerns of RV lead failure specially that she is dependent.    Past Medical History:   Diagnosis Date    Allergic     Bone cancer     METASTATIC BONE    Bradycardia     SECONDARY TO ABLATION    Breast cancer     mets to lymph nodes; did not do radiation    Cancer of unknown origin     Compression fx, thoracic spine, open, initial encounter     Coronary artery disease     COVID-19 2021    Diabetes mellitus     Heart disease, unspecified     Hepatitis C     RESOLVED WITH MEDICATION    Hyperlipidemia     Hypertension     Obesity (BMI 30-39.9) 2021    Rib fracture     Per patient    Sleep apnea     no machine    Tachycardia     ATRIAL    Type 2 diabetes mellitus 2017     Past Surgical History:   Procedure Laterality Date    BACK SURGERY      neck X 2    BREAST RECONSTRUCTION Bilateral     CARDIAC ABLATION       atrial tachycardia x 5 ablations      CARDIAC CATHETERIZATION      CARDIAC ELECTROPHYSIOLOGY PROCEDURE N/A 2023    Procedure: Pacemaker RV lead insertion with generator change out. [employer];  Surgeon: Steven Hammond MD;  Location: [facility] CATH INVASIVE LOCATION;  Service: Cardiovascular;  Laterality: N/A;    CARDIAC SURGERY      stent placed in aorta    CARDIAC SURGERY      6 surgeries as baby     CERVICAL FUSION ANTERIOR WITH ARTIFICIAL DISCECTOMY IMPLANTATION N/A 2020    Procedure: C4 VERTEBRECTOMY AND ANTERIOR CERIVCAL DISCECTOMY WITH FUSION OF CERVICAL THREE THROUGH FIVE WITH REMOVAL OF HARDWARE C5-C6;  Surgeon: Mark Kaba MD;  Location: [facility] MAIN OR;  Service: Neurosurgery;  Laterality: N/A;     SECTION      x2    COLONOSCOPY N/A 10/23/2020    Procedure: COLONOSCOPY WITH POLYPECTOMY X6;  Surgeon: Stanley Bourne MD;  Location: [facility] ENDOSCOPY;  Service: Gastroenterology;  Laterality: N/A;  POLYPS, INTERNAL HEMORRHOIDS    ENDOMETRIAL ABLATION      REMOVAL SCAR TISSUE UTERINE    ENDOSCOPY N/A 10/23/2020    Procedure: ESOPHAGOGASTRODUODENOSCOPY WITH BIOPSY X 1 AREA;  Surgeon: Stanley Bourne MD;  Location: [facility] ENDOSCOPY;  Service: Gastroenterology;  Laterality: N/A;  GASTRITIS, ESOPHAGITIS, HIATAL HERNIA    INSERT / REPLACE / REMOVE PACEMAKER      KNEE SURGERY      MASTECTOMY Bilateral     NECK SURGERY      PACEMAKER IMPLANTATION      PAIN PUMP INSERTION/REVISION N/A 2022    Procedure: PAIN PUMP INSERTION AND INTRATHECAL CATHETER PLACEMENT;  Surgeon: Chuck Hunter MD;  Location: [facility] MAIN OR;  Service: Pain Management;  Laterality: N/A;     Family History   Problem Relation Age of Onset    Heart disease Mother     Stroke Mother     Lung cancer Mother     Aneurysm Father     Diabetes Sister     No Known Problems Brother     No Known Problems Brother     Diabetes type I Half-Sister     Thyroid cancer Half-Sister     Cancer Maternal Aunt     Heart attack Sister     Thyroid  "disease Sister     No Known Problems Sister      Social History     Tobacco Use    Smoking status: Never     Passive exposure: Never    Smokeless tobacco: Never   Vaping Use    Vaping status: Never Used   Substance Use Topics    Alcohol use: Not Currently     Comment: drinks rarely    Drug use: Never       Current Outpatient Medications:     Blood Glucose Monitoring Suppl (Accu-Chek Araceli) device, Use as instructed   To test blood sugar bid, Disp: 1 each, Rfl: 0    Calcium Carbonate-Vit D-Min (Calcium 600+D Plus Minerals) 600-400 MG-UNIT chewable tablet, Chew 600 mg 2 (Two) Times a Day., Disp: 60 each, Rfl: 6    Continuous Blood Gluc  (FreeStyle Rajeev 14 Day [place]) device, 1 each Daily., Disp: 1 each, Rfl: 0    Continuous Blood Gluc Sensor (FreeStyle Rajeev 14 Day Sensor) misc, 1 each Daily., Disp: 6 each, Rfl: 3    cyclobenzaprine (FLEXERIL) 10 MG tablet, Take 1 tablet by mouth 2 (Two) Times a Day As Needed for Muscle Spasms., Disp: 30 tablet, Rfl: 1    exemestane (AROMASIN) 25 MG tablet, Take 1 tablet by mouth Daily., Disp: , Rfl:     famotidine (PEPCID) 20 MG tablet, Take 1 tablet by mouth 2 (Two) Times a Day As Needed for Heartburn., Disp: , Rfl:     ibuprofen (ADVIL,MOTRIN) 800 MG tablet, Take 1 tablet by mouth Every 6 (Six) Hours As Needed for Mild Pain., Disp: 90 tablet, Rfl: 0    insulin NPH-insulin regular (humuLIN 70/30,novoLIN 70/30) (70-30) 100 UNIT/ML injection, Inject 5 Units under the skin into the appropriate area as directed Every Evening. If BS over 180, Disp: , Rfl:     Insulin Pen Needle (B-D UF III MINI PEN NEEDLES) 31G X 5 MM misc, Use to inject insulin twice daily   DX: E11.9, Disp: 100 each, Rfl: 0    Insulin Syringe-Needle U-100 28G X 1/2\" 1 ML misc, 1 each 2 (Two) Times a Day., Disp: 100 each, Rfl: 6    losartan (Cozaar) 50 MG tablet, Take 1 tablet by mouth Daily. Discontinue the lisinopril due to interaction with new chemotherapy, Disp: 90 tablet, Rfl: 1    Morphine Sulfate, PF, 8 " MG/ML solution, 8.5 mg by Intrathecal Infusion route Daily. Receives 8 mg through pain pump q 24 hrs, Disp: , Rfl:     rosuvastatin (Crestor) 20 MG tablet, Take 1 tablet by mouth Every Night., Disp: 90 tablet, Rfl: 0    azithromycin (Zithromax) 500 MG tablet, Take 1 tablet by mouth Daily. (Patient not taking: Reported on 3/19/2024), Disp: 3 tablet, Rfl: 0  Allergies:  Oxycodone, Promethazine, and Tape      Physical Exam  VITALS REVIEWED    General:      well developed, in no acute distress.    Head:      normocephalic and atraumatic.    Eyes:      PERRL/EOM intact, conjunctiva and sclera clear with out nystagmus.    Neck:      no masses, thyromegaly,  trachea central with normal respiratory effort and PMI displaced laterally  Lungs:      Clear to auscultation bilaterally  Heart:       Regular rate and rhythm  Msk:      no deformity or scoliosis noted of thoracic or lumbar spine.    Pulses:      pulses normal in all 4 extremities.    Extremities:       No lower extremity edema  Neurologic:      no focal deficits.   alert oriented x3  Skin:      intact without lesions or rashes.    Psych:      alert and cooperative; normal mood and affect; normal attention span and concentration.      Result Review :               Pertinent cardiac workup    EKG 11/6/2023 a-V paced rhythm  Echocardiogram 3/10/2023 ejection fraction 55 to 60% severely reduced RV systolic function severe tricuspid regurgitation.      Procedures        Assessment and Plan      Gladys Navarrete a 63-year-old female patient who was tetralogy of Fallot and has had multiple surgeries in her youth. She has complete heart block and has a dual-chamber pacemaker originally implanted in 2007. Patient also has breast cancer which is metastatic to the spine.   Patient underwent CRT-P upgrade with addition of a level area pacing lead on 12/11/2023 and she is here today for follow-up.  She has not had any syncopal episodes.  I performed a device interrogation  today, she is V paced 100% of the time, 70% in the A.  Thresholds on all 3 leads less than 1 V.  No arrhythmias.  Patient was advised to set up remote monitoring at Dr. Lopez's office.  She did have concerns about her valvular heart disease.  I advised her to check with Dr. Lopez about possibly getting a follow-up echocardiogram.  I will see her on as-needed basis.    Diagnoses and all orders for this visit:    1. AV block, complete (Primary)    2. Pacemaker    3. Malignant neoplasm metastatic to bone    4. Nonrheumatic tricuspid valve regurgitation    5. Tetralogy of Fallot    6. Pulmonary hypertension           Return if symptoms worsen or fail to improve.  Patient was given instructions and counseling regarding her condition or for health maintenance advice. Please see specific information pulled into the AVS if appropriate.

## 2024-03-25 ENCOUNTER — HOSPITAL ENCOUNTER (EMERGENCY)
Facility: HOSPITAL | Age: 64
Discharge: HOME | End: 2024-03-25
Attending: EMERGENCY MEDICINE | Admitting: EMERGENCY MEDICINE
Payer: MEDICARE

## 2024-03-25 ENCOUNTER — APPOINTMENT (OUTPATIENT)
Dept: GENERAL RADIOLOGY | Facility: HOSPITAL | Age: 64
End: 2024-03-25
Payer: MEDICARE

## 2024-03-25 VITALS
BODY MASS INDEX: 24.93 KG/M2 | DIASTOLIC BLOOD PRESSURE: 79 MMHG | SYSTOLIC BLOOD PRESSURE: 152 MMHG | HEIGHT: 60 IN | RESPIRATION RATE: 20 BRPM | WEIGHT: 126.98 LBS | HEART RATE: 74 BPM | OXYGEN SATURATION: 98 % | TEMPERATURE: 97.8 F

## 2024-03-25 DIAGNOSIS — R05.9 COUGH, UNSPECIFIED TYPE: ICD-10-CM

## 2024-03-25 DIAGNOSIS — R44.2 PHANTOSMIA: Primary | ICD-10-CM

## 2024-03-25 LAB
ALBUMIN SERPL-MCNC: 3.9 G/DL (ref 3.5–5.2)
ALBUMIN/GLOB SERPL: 1.6 G/DL
ALP SERPL-CCNC: 63 U/L (ref 39–117)
ALT SERPL W P-5'-P-CCNC: 10 U/L (ref 1–33)
ANION GAP SERPL CALCULATED.3IONS-SCNC: 13 MMOL/L (ref 5–15)
AST SERPL-CCNC: 20 U/L (ref 1–32)
BASOPHILS # BLD AUTO: 0.01 10*3/MM3 (ref 0–0.2)
BASOPHILS NFR BLD AUTO: 0.4 % (ref 0–1.5)
BILIRUB SERPL-MCNC: 0.4 MG/DL (ref 0–1.2)
BUN SERPL-MCNC: 15 MG/DL (ref 8–23)
BUN/CREAT SERPL: 18.1 (ref 7–25)
CALCIUM SPEC-SCNC: 8.7 MG/DL (ref 8.6–10.5)
CHLORIDE SERPL-SCNC: 111 MMOL/L (ref 98–107)
CO2 SERPL-SCNC: 20 MMOL/L (ref 22–29)
CREAT SERPL-MCNC: 0.83 MG/DL (ref 0.57–1)
DEPRECATED RDW RBC AUTO: 53.8 FL (ref 37–54)
EGFRCR SERPLBLD CKD-EPI 2021: 80.3 ML/MIN/1.73
EOSINOPHIL # BLD AUTO: 0.01 10*3/MM3 (ref 0–0.4)
EOSINOPHIL NFR BLD AUTO: 0.4 % (ref 0.3–6.2)
ERYTHROCYTE [DISTWIDTH] IN BLOOD BY AUTOMATED COUNT: 15.4 % (ref 12.3–15.4)
FLUAV SUBTYP SPEC NAA+PROBE: NOT DETECTED
FLUBV RNA ISLT QL NAA+PROBE: NOT DETECTED
GLOBULIN UR ELPH-MCNC: 2.4 GM/DL
GLUCOSE SERPL-MCNC: 108 MG/DL (ref 65–99)
HCT VFR BLD AUTO: 34.8 % (ref 34–46.6)
HGB BLD-MCNC: 11.2 G/DL (ref 12–15.9)
IMM GRANULOCYTES # BLD AUTO: 0.01 10*3/MM3 (ref 0–0.05)
IMM GRANULOCYTES NFR BLD AUTO: 0.4 % (ref 0–0.5)
LIPASE SERPL-CCNC: 20 U/L (ref 13–60)
LYMPHOCYTES # BLD AUTO: 0.33 10*3/MM3 (ref 0.7–3.1)
LYMPHOCYTES NFR BLD AUTO: 13.5 % (ref 19.6–45.3)
MCH RBC QN AUTO: 30.8 PG (ref 26.6–33)
MCHC RBC AUTO-ENTMCNC: 32.2 G/DL (ref 31.5–35.7)
MCV RBC AUTO: 95.6 FL (ref 79–97)
MONOCYTES # BLD AUTO: 0.15 10*3/MM3 (ref 0.1–0.9)
MONOCYTES NFR BLD AUTO: 6.1 % (ref 5–12)
NEUTROPHILS NFR BLD AUTO: 1.94 10*3/MM3 (ref 1.7–7)
NEUTROPHILS NFR BLD AUTO: 79.2 % (ref 42.7–76)
NRBC BLD AUTO-RTO: 0 /100 WBC (ref 0–0.2)
PLATELET # BLD AUTO: 114 10*3/MM3 (ref 140–450)
PMV BLD AUTO: 11.2 FL (ref 6–12)
POTASSIUM SERPL-SCNC: 3.4 MMOL/L (ref 3.5–5.2)
PROT SERPL-MCNC: 6.3 G/DL (ref 6–8.5)
RBC # BLD AUTO: 3.64 10*6/MM3 (ref 3.77–5.28)
S PYO AG THROAT QL: NEGATIVE
SARS-COV-2 RNA RESP QL NAA+PROBE: NOT DETECTED
SODIUM SERPL-SCNC: 144 MMOL/L (ref 136–145)
WBC NRBC COR # BLD AUTO: 2.45 10*3/MM3 (ref 3.4–10.8)

## 2024-03-25 PROCEDURE — 87651 STREP A DNA AMP PROBE: CPT | Performed by: EMERGENCY MEDICINE

## 2024-03-25 PROCEDURE — 93005 ELECTROCARDIOGRAM TRACING: CPT

## 2024-03-25 PROCEDURE — 87636 SARSCOV2 & INF A&B AMP PRB: CPT | Performed by: EMERGENCY MEDICINE

## 2024-03-25 PROCEDURE — 99284 EMERGENCY DEPT VISIT MOD MDM: CPT

## 2024-03-25 PROCEDURE — 80053 COMPREHEN METABOLIC PANEL: CPT | Performed by: EMERGENCY MEDICINE

## 2024-03-25 PROCEDURE — 93005 ELECTROCARDIOGRAM TRACING: CPT | Performed by: EMERGENCY MEDICINE

## 2024-03-25 PROCEDURE — 71045 X-RAY EXAM CHEST 1 VIEW: CPT

## 2024-03-25 PROCEDURE — 83690 ASSAY OF LIPASE: CPT | Performed by: EMERGENCY MEDICINE

## 2024-03-25 PROCEDURE — 85025 COMPLETE CBC W/AUTO DIFF WBC: CPT | Performed by: EMERGENCY MEDICINE

## 2024-03-25 RX ORDER — SODIUM CHLORIDE 0.9 % (FLUSH) 0.9 %
10 SYRINGE (ML) INJECTION AS NEEDED
Status: DISCONTINUED | OUTPATIENT
Start: 2024-03-25 | End: 2024-03-25 | Stop reason: HOSPADM

## 2024-03-25 NOTE — ED PROVIDER NOTES
Subjective   History of Present Illness  Chief complaint: Foul smell    63-year-old female presents stating she has had a foul smell that she has been noticing for the past 2 days.  She states no one else she is around is smelling anything unusual.  She was concerned she may be having a sinus infection so she went to an urgent care center and they said everything looked fine from that standpoint.  She has had no fever.  She does report some intermittent cough over the past couple days.  She had 1 episode of vomiting last night.  She denies diarrhea.    History provided by:  Patient      Review of Systems   Constitutional:  Negative for fever.   HENT:  Positive for sore throat. Negative for congestion and sinus pain.    Respiratory:  Positive for cough. Negative for shortness of breath.    Cardiovascular:  Negative for chest pain.   Gastrointestinal:  Positive for vomiting. Negative for abdominal pain and diarrhea.   Musculoskeletal:  Negative for back pain.   Neurological:  Negative for headaches.   Psychiatric/Behavioral:  Negative for confusion.        Past Medical History:   Diagnosis Date    Allergic     Bone cancer     METASTATIC BONE    Bradycardia     SECONDARY TO ABLATION    Breast cancer 2017    mets to lymph nodes; did not do radiation    Cancer of unknown origin     Compression fx, thoracic spine, open, initial encounter     Coronary artery disease     COVID-19 09/01/2021    Diabetes mellitus     Heart disease, unspecified     Hepatitis C     RESOLVED WITH MEDICATION    Hyperlipidemia     Hypertension     Obesity (BMI 30-39.9) 02/05/2021    Rib fracture     Per patient    Sleep apnea     no machine    Tachycardia     ATRIAL    Type 2 diabetes mellitus 11/2017       Allergies   Allergen Reactions    Oxycodone Nausea And Vomiting    Promethazine Other (See Comments)     Hyperactive mean    Tape Other (See Comments)     .blisters         Past Surgical History:   Procedure Laterality Date    BACK SURGERY       neck X 2    BREAST RECONSTRUCTION Bilateral     CARDIAC ABLATION       atrial tachycardia x 5 ablations     CARDIAC CATHETERIZATION      CARDIAC ELECTROPHYSIOLOGY PROCEDURE N/A 2023    Procedure: Pacemaker RV lead insertion with generator change out. [employer];  Surgeon: Steven Hammond MD;  Location: [facility] CATH INVASIVE LOCATION;  Service: Cardiovascular;  Laterality: N/A;    CARDIAC SURGERY      stent placed in aorta    CARDIAC SURGERY      6 surgeries as baby     CERVICAL FUSION ANTERIOR WITH ARTIFICIAL DISCECTOMY IMPLANTATION N/A 2020    Procedure: C4 VERTEBRECTOMY AND ANTERIOR CERIVCAL DISCECTOMY WITH FUSION OF CERVICAL THREE THROUGH FIVE WITH REMOVAL OF HARDWARE C5-C6;  Surgeon: Mark Kaba MD;  Location: [facility] MAIN OR;  Service: Neurosurgery;  Laterality: N/A;     SECTION      x2    COLONOSCOPY N/A 10/23/2020    Procedure: COLONOSCOPY WITH POLYPECTOMY X6;  Surgeon: Stanley Bourne MD;  Location: [facility] ENDOSCOPY;  Service: Gastroenterology;  Laterality: N/A;  POLYPS, INTERNAL HEMORRHOIDS    ENDOMETRIAL ABLATION      REMOVAL SCAR TISSUE UTERINE    ENDOSCOPY N/A 10/23/2020    Procedure: ESOPHAGOGASTRODUODENOSCOPY WITH BIOPSY X 1 AREA;  Surgeon: Stanley Bourne MD;  Location: [facility] ENDOSCOPY;  Service: Gastroenterology;  Laterality: N/A;  GASTRITIS, ESOPHAGITIS, HIATAL HERNIA    INSERT / REPLACE / REMOVE PACEMAKER      KNEE SURGERY      MASTECTOMY Bilateral     NECK SURGERY      PACEMAKER IMPLANTATION      PAIN PUMP INSERTION/REVISION N/A 2022    Procedure: PAIN PUMP INSERTION AND INTRATHECAL CATHETER PLACEMENT;  Surgeon: Chuck Hunter MD;  Location: [facility] MAIN OR;  Service: Pain Management;  Laterality: N/A;       Family History   Problem Relation Age of Onset    Heart disease Mother     Stroke Mother     Lung cancer Mother     Aneurysm Father     Diabetes Sister     No Known Problems Brother     No Known Problems Brother     Diabetes type  "I Half-Sister     Thyroid cancer Half-Sister     Cancer Maternal Aunt     Heart attack Sister     Thyroid disease Sister     No Known Problems Sister        Social History     Socioeconomic History    Marital status: Legally     Number of children: 2   Tobacco Use    Smoking status: Never     Passive exposure: Never    Smokeless tobacco: Never   Vaping Use    Vaping status: Never Used   Substance and Sexual Activity    Alcohol use: Not Currently     Comment: drinks rarely    Drug use: Never    Sexual activity: Defer       /79   Pulse 61   Temp 97.8 °F (36.6 °C) (Oral)   Resp 20   Ht 152.4 cm (60\")   Wt 57.6 kg (126 lb 15.8 oz)   LMP  (LMP Unknown)   SpO2 96%   BMI 24.80 kg/m²       Objective   Physical Exam  Vitals and nursing note reviewed.   Constitutional:       Appearance: She is well-developed.   HENT:      Head: Normocephalic and atraumatic.      Nose:      Right Sinus: No maxillary sinus tenderness or frontal sinus tenderness.      Left Sinus: No maxillary sinus tenderness or frontal sinus tenderness.      Mouth/Throat:      Mouth: Mucous membranes are moist.      Pharynx: Oropharynx is clear.   Cardiovascular:      Rate and Rhythm: Normal rate and regular rhythm.      Heart sounds: Normal heart sounds.   Pulmonary:      Effort: Pulmonary effort is normal. No respiratory distress.      Breath sounds: Normal breath sounds.   Abdominal:      General: Bowel sounds are normal.      Palpations: Abdomen is soft.      Tenderness: There is no abdominal tenderness.   Skin:     General: Skin is warm and dry.   Neurological:      Mental Status: She is alert and oriented to person, place, and time.         Procedures           ED Course      My interpretation of EKG shows atrial ventricular dual paced rhythm, rate of 60, unchanged from previous                           Results for orders placed or performed during the hospital encounter of 03/25/24   COVID-19 and FLU A/B PCR, 1 HR TAT - Swab, " Nasopharynx    Specimen: Nasopharynx; Swab   Result Value Ref Range    COVID19 Not Detected Not Detected - Ref. Range    Influenza A PCR Not Detected Not Detected    Influenza B PCR Not Detected Not Detected   Rapid Strep A Screen - Swab, Throat    Specimen: Throat; Swab   Result Value Ref Range    Strep A Ag Negative Negative   Comprehensive Metabolic Panel    Specimen: Blood   Result Value Ref Range    Glucose 108 (H) 65 - 99 mg/dL    BUN 15 8 - 23 mg/dL    Creatinine 0.83 0.57 - 1.00 mg/dL    Sodium 144 136 - 145 mmol/L    Potassium 3.4 (L) 3.5 - 5.2 mmol/L    Chloride 111 (H) 98 - 107 mmol/L    CO2 20.0 (L) 22.0 - 29.0 mmol/L    Calcium 8.7 8.6 - 10.5 mg/dL    Total Protein 6.3 6.0 - 8.5 g/dL    Albumin 3.9 3.5 - 5.2 g/dL    ALT (SGPT) 10 1 - 33 U/L    AST (SGOT) 20 1 - 32 U/L    Alkaline Phosphatase 63 39 - 117 U/L    Total Bilirubin 0.4 0.0 - 1.2 mg/dL    Globulin 2.4 gm/dL    A/G Ratio 1.6 g/dL    BUN/Creatinine Ratio 18.1 7.0 - 25.0    Anion Gap 13.0 5.0 - 15.0 mmol/L    eGFR 80.3 >60.0 mL/min/1.73   Lipase    Specimen: Blood   Result Value Ref Range    Lipase 20 13 - 60 U/L   CBC Auto Differential    Specimen: Blood   Result Value Ref Range    WBC 2.45 (L) 3.40 - 10.80 10*3/mm3    RBC 3.64 (L) 3.77 - 5.28 10*6/mm3    Hemoglobin 11.2 (L) 12.0 - 15.9 g/dL    Hematocrit 34.8 34.0 - 46.6 %    MCV 95.6 79.0 - 97.0 fL    MCH 30.8 26.6 - 33.0 pg    MCHC 32.2 31.5 - 35.7 g/dL    RDW 15.4 12.3 - 15.4 %    RDW-SD 53.8 37.0 - 54.0 fl    MPV 11.2 6.0 - 12.0 fL    Platelets 114 (L) 140 - 450 10*3/mm3    Neutrophil % 79.2 (H) 42.7 - 76.0 %    Lymphocyte % 13.5 (L) 19.6 - 45.3 %    Monocyte % 6.1 5.0 - 12.0 %    Eosinophil % 0.4 0.3 - 6.2 %    Basophil % 0.4 0.0 - 1.5 %    Immature Grans % 0.4 0.0 - 0.5 %    Neutrophils, Absolute 1.94 1.70 - 7.00 10*3/mm3    Lymphocytes, Absolute 0.33 (L) 0.70 - 3.10 10*3/mm3    Monocytes, Absolute 0.15 0.10 - 0.90 10*3/mm3    Eosinophils, Absolute 0.01 0.00 - 0.40 10*3/mm3    Basophils,  Absolute 0.01 0.00 - 0.20 10*3/mm3    Immature Grans, Absolute 0.01 0.00 - 0.05 10*3/mm3    nRBC 0.0 0.0 - 0.2 /100 WBC   ECG 12 Lead Other; burning in chest   Result Value Ref Range    QT Interval 501 ms    QTC Interval 501 ms     *Note: Due to a large number of results and/or encounters for the requested time period, some results have not been displayed. A complete set of results can be found in Results Review.     XR Chest 1 View    Result Date: 3/25/2024  Impression: Findings suggestive of chronic pulmonary edema/pulmonary vascular congestion. Superimposed multifocal pneumonia is not completely excluded. Electronically Signed: Dc Ramirez,   3/25/2024 5:36 PM EDT  Workstation ID: [id number]               Medical Decision Making  Amount and/or Complexity of Data Reviewed  Labs: ordered.  Radiology: ordered.  ECG/medicine tests: ordered.    Risk  Prescription drug management.      Patient had the above evaluation.  Results were discussed with the patient.  Chest x-ray is showing possible chronic pulmonary edema/pulmonary vascular congestion.  Patient states she has scarring on her lungs from previous cancer treatments.  White blood cell count is 2.45 which is actually improved from recent levels.  CMP is unremarkable.  COVID and flu are negative.  Strep screen is negative.  Patient remains well-appearing in the emergency room.  She will be discharged and she was encouraged to follow-up with her oncologist for further evaluation.      Final diagnoses:   Phantosmia   Cough, unspecified type       ED Disposition  ED Disposition       ED Disposition   Discharge    Condition   Stable    Comment   --               Мария Nunez MD  [address]  [place] [facility]  [phone]    Call in 1 day           Medication List      No changes were made to your prescriptions during this visit.            Ki Palacios MD  03/25/24 [id number]

## 2024-03-25 NOTE — DISCHARGE INSTRUCTIONS
Follow-up with your oncologist as well as your primary doctor.  Return to the emergency room for any new or worsening symptoms or if you have any other questions or concerns.

## 2024-03-26 ENCOUNTER — TELEPHONE (OUTPATIENT)
Dept: ONCOLOGY | Facility: CLINIC | Age: 64
End: 2024-03-26

## 2024-03-26 LAB
QT INTERVAL: 501 MS
QTC INTERVAL: 501 MS

## 2024-03-26 NOTE — TELEPHONE ENCOUNTER
Caller: Gladys Garrison    Relationship: Self    Best call back number: [phone]    What is the best time to reach you: ANYTIME    Who are you requesting to speak with (clinical staff, provider,  specific staff member): CLINICAL    What was the call regarding: PT IS REQUESTING A CALL BACK IN REGARDS TO HER ER VISIT, PT HAS BEEN HAVING A REALLY BAD SMELL AND BURNING IN HER NOSE AND MOUTH, SHE WENT TO THE ER AND WAS ADVISED TO CONTACT DR LYLE, SHE STOPPED HER CANCER MEDICATION ON SUNDAY THINKING THIS MAY BE THE CAUSE.  PLEASE ADVISE

## 2024-03-27 ENCOUNTER — OFFICE VISIT (OUTPATIENT)
Dept: FAMILY MEDICINE CLINIC | Facility: CLINIC | Age: 64
End: 2024-03-27
Payer: MEDICARE

## 2024-03-27 VITALS
RESPIRATION RATE: 16 BRPM | BODY MASS INDEX: 22.37 KG/M2 | SYSTOLIC BLOOD PRESSURE: 142 MMHG | WEIGHT: 118.5 LBS | DIASTOLIC BLOOD PRESSURE: 78 MMHG | OXYGEN SATURATION: 99 % | HEART RATE: 61 BPM | HEIGHT: 61 IN

## 2024-03-27 DIAGNOSIS — J01.80 ACUTE NON-RECURRENT SINUSITIS OF OTHER SINUS: ICD-10-CM

## 2024-03-27 DIAGNOSIS — R43.2 DYSGEUSIA: Primary | ICD-10-CM

## 2024-03-27 RX ORDER — AZITHROMYCIN 500 MG/1
500 TABLET, FILM COATED ORAL DAILY
Qty: 3 TABLET | Refills: 0 | Status: SHIPPED | OUTPATIENT
Start: 2024-03-27 | End: 2024-03-30

## 2024-03-27 NOTE — PROGRESS NOTES
[facility]   03/27/2024    Patient Name: Gladys Garrison   YOB: 1960   Age/Sex: 63 y.o. female   Medical Record Number: [id number]   Primary Care Physician: Chirag Robles DO     Subjective     Chief Complaint     Chief Complaint   Patient presents with    Hospital Follow Up Visit     Nose is dry and burning.         History of Present Illness     History of Present Illness  The patient presents for evaluation of sinus infection.    Started Sunday   Schwartz Seal Root: natural antibiotic    Stopped Sunday   Smell every other day until yesterday, worse yesterday  Sneezing, coughing, hanging in middle of throat, yellow/green drainage  No congestion  Mucinex x 2 and smell and nose got worse      Taste and smell both affected  Burning sensation in nose and throat/mouth    She thinks she has a sinus infection. She called out on Sunday because she was hurting really bad. She has a new pain threshold. She cried all day Saturday at work and did not want to cry for 2 days. On Sunday around 11:00, she started to smell a sweet, nasty, disgusting smell. She went to the hospital 10 times. She was given a nose clamp, but she could not wear it anymore. She was tested for COVID-19 and strep throat, which were negative. She kept telling it was a sinus infection. They checked her ears and told her it did not look like a sinus infection. She has been taking Schwartz Silver natural antibiotic once a day, which does help with urinary tract and sinus infections. Yesterday, the smell was very light, but last night it got worse. She woke up in the middle of the night sneezing and coughing. She can feel stuff in the back of her throat, but she can not. She feels pain in the middle of her throat and mouth. She has yellow-green discharge from the roof of her mouth when she pulls it out. She has not been able to eat because everything tastes nasty. She has burning sensations in her nose, throat,  "and upper part of the lung. She stopped her Verzenio on Sunday as soon as she started getting sick. She does not feel like she is congested. She drinks a lot of water. The generic version of Mucinex made her symptoms worse.          Review of Systems   A medically appropriate and patient-specific review of systems was performed. Pertinent findings are mentioned in the HPI, with no additional significant findings beyond those already noted.      Patient History   The following portions of the patient's history were reviewed and updated as appropriate:   allergies, current medications, problem list, PMHx, PSHx, PFHx, past social history    Objective     Vitals:    03/27/24 1057   BP: 142/78   Pulse: 61   Resp: 16   SpO2: 99%   Weight: 53.8 kg (118 lb 8 oz)   Height: 154.9 cm (61\")        Physical Exam   Constitutional: Alert, well-appearing, no acute distress  HENT: NCAT, mucous membranes moist, posterior pharynx erythematous  Neck: Supple  Respiratory: No respiratory distress, good effort and air entry, clear to auscultation bilaterally   Cardiovascular: RRR      Assessment & Plan     Diagnoses and all orders for this visit:    Dysgeusia  Comments:  Likely related to her chemotherapy but patient extremely concerned about bacterial infection in setting of malignancy and immune suppression. Will treat with 3 day course of azithromycin. Discussed that if not improved, then she needs to discuss with oncologist about her chemotherapy (Verzenio). She has follow up with her oncologist next week.     Acute non-recurrent sinusitis of other sinus  -     azithromycin (Zithromax) 500 MG tablet; Take 1 tablet by mouth Daily for 3 days.          Follow Up   Return if symptoms worsen or fail to improve.  Patient was given instructions and counseling regarding her condition or for health maintenance advice. Please see specific information pulled into the AVS if appropriate.     This medical documentation was produced in part using " speech recognition software (Dragon Dictation System) and may contain errors related to that system including errors in grammar, punctuation, and spelling, as well as words and phrases that may be inappropriate and not intentional --- If there are any questions or concerns please feel free to contact me, the dictating provider, for clarification.      Patient or patient representative verbalized consent for the use of Ambient Listening during the visit with  Brandy Arboleda MD for chart documentation.    Brandy Arboleda MD

## 2024-03-28 ENCOUNTER — OFFICE VISIT (OUTPATIENT)
Dept: ONCOLOGY | Facility: CLINIC | Age: 64
End: 2024-03-28
Payer: MEDICARE

## 2024-03-28 ENCOUNTER — LAB (OUTPATIENT)
Dept: LAB | Facility: HOSPITAL | Age: 64
End: 2024-03-28
Payer: MEDICARE

## 2024-03-28 VITALS
HEIGHT: 60 IN | TEMPERATURE: 98 F | DIASTOLIC BLOOD PRESSURE: 72 MMHG | OXYGEN SATURATION: 97 % | SYSTOLIC BLOOD PRESSURE: 121 MMHG | WEIGHT: 121 LBS | RESPIRATION RATE: 18 BRPM | HEART RATE: 71 BPM | BODY MASS INDEX: 23.75 KG/M2

## 2024-03-28 DIAGNOSIS — C79.51 MALIGNANT NEOPLASM METASTATIC TO BONE: Primary | ICD-10-CM

## 2024-03-28 DIAGNOSIS — C50.919 MALIGNANT NEOPLASM OF FEMALE BREAST, UNSPECIFIED ESTROGEN RECEPTOR STATUS, UNSPECIFIED LATERALITY, UNSPECIFIED SITE OF BREAST: Primary | ICD-10-CM

## 2024-03-28 DIAGNOSIS — C50.919 MALIGNANT NEOPLASM OF FEMALE BREAST, UNSPECIFIED ESTROGEN RECEPTOR STATUS, UNSPECIFIED LATERALITY, UNSPECIFIED SITE OF BREAST: ICD-10-CM

## 2024-03-28 LAB
ALBUMIN SERPL-MCNC: 4.9 G/DL (ref 3.5–5.2)
ALBUMIN/GLOB SERPL: 1.8 G/DL
ALP SERPL-CCNC: 81 U/L (ref 39–117)
ALT SERPL W P-5'-P-CCNC: 16 U/L (ref 1–33)
ANION GAP SERPL CALCULATED.3IONS-SCNC: 12 MMOL/L (ref 5–15)
AST SERPL-CCNC: 27 U/L (ref 1–32)
BASOPHILS # BLD AUTO: 0.02 10*3/MM3 (ref 0–0.2)
BASOPHILS NFR BLD AUTO: 0.7 % (ref 0–1.5)
BILIRUB SERPL-MCNC: 0.5 MG/DL (ref 0–1.2)
BUN SERPL-MCNC: 14 MG/DL (ref 8–23)
BUN/CREAT SERPL: 14.1 (ref 7–25)
CALCIUM SPEC-SCNC: 9.4 MG/DL (ref 8.6–10.5)
CHLORIDE SERPL-SCNC: 102 MMOL/L (ref 98–107)
CO2 SERPL-SCNC: 26 MMOL/L (ref 22–29)
CREAT SERPL-MCNC: 0.99 MG/DL (ref 0.57–1)
DEPRECATED RDW RBC AUTO: 47.4 FL (ref 37–54)
EGFRCR SERPLBLD CKD-EPI 2021: 65 ML/MIN/1.73
EOSINOPHIL # BLD AUTO: 0.02 10*3/MM3 (ref 0–0.4)
EOSINOPHIL NFR BLD AUTO: 0.7 % (ref 0.3–6.2)
ERYTHROCYTE [DISTWIDTH] IN BLOOD BY AUTOMATED COUNT: 14.5 % (ref 12.3–15.4)
GLOBULIN UR ELPH-MCNC: 2.8 GM/DL
GLUCOSE SERPL-MCNC: 155 MG/DL (ref 65–99)
HCT VFR BLD AUTO: 39.2 % (ref 34–46.6)
HGB BLD-MCNC: 13.2 G/DL (ref 12–15.9)
HOLD SPECIMEN: NORMAL
HOLD SPECIMEN: NORMAL
LYMPHOCYTES # BLD AUTO: 0.61 10*3/MM3 (ref 0.7–3.1)
LYMPHOCYTES NFR BLD AUTO: 20.3 % (ref 19.6–45.3)
MCH RBC QN AUTO: 31.4 PG (ref 26.6–33)
MCHC RBC AUTO-ENTMCNC: 33.7 G/DL (ref 31.5–35.7)
MCV RBC AUTO: 93.3 FL (ref 79–97)
MONOCYTES # BLD AUTO: 0.24 10*3/MM3 (ref 0.1–0.9)
MONOCYTES NFR BLD AUTO: 8 % (ref 5–12)
NEUTROPHILS NFR BLD AUTO: 2.12 10*3/MM3 (ref 1.7–7)
NEUTROPHILS NFR BLD AUTO: 70.3 % (ref 42.7–76)
PLATELET # BLD AUTO: 90 10*3/MM3 (ref 140–450)
PMV BLD AUTO: 9.8 FL (ref 6–12)
POTASSIUM SERPL-SCNC: 3.2 MMOL/L (ref 3.5–5.2)
PROT SERPL-MCNC: 7.7 G/DL (ref 6–8.5)
RBC # BLD AUTO: 4.2 10*6/MM3 (ref 3.77–5.28)
SODIUM SERPL-SCNC: 140 MMOL/L (ref 136–145)
WBC NRBC COR # BLD AUTO: 3.01 10*3/MM3 (ref 3.4–10.8)

## 2024-03-28 PROCEDURE — 85025 COMPLETE CBC W/AUTO DIFF WBC: CPT

## 2024-03-28 PROCEDURE — 36415 COLL VENOUS BLD VENIPUNCTURE: CPT

## 2024-03-28 PROCEDURE — 80053 COMPREHEN METABOLIC PANEL: CPT | Performed by: INTERNAL MEDICINE

## 2024-03-28 NOTE — PROGRESS NOTES
Hematology/Oncology Outpatient Follow Up    PATIENT NAME:Gladys Garrison  :1960  MRN: [id number]  PRIMARY CARE PHYSICIAN: Chirag Robles DO  REFERRING PHYSICIAN: Chirag Robles, *      Chief Complaint   Patient presents with    Follow-up     Malignant neoplasm of female breast, unspecified estrogen receptor status, unspecified laterality, unspecified site of breast        HISTORY OF PRESENT ILLNESS:     This is a 63-year-old female who multiple comorbidities including congenital abnormalities such as hypoplastic kidney, tetralogy of Fallot, coarctation of the aorta and congenital VSD status post repair.  Patient developed syncopal episode and for that reason she had she had a CT scan of the chest which showed mass in the left breast.  She had diagnostic mammogram and ultrasound which showed a 2 cm spiculated mass in the left breast at the 1 o'clock position 6 cm from the nipple.  Biopsy of the left breast mass revealed invasive moderately differentiated carcinoma ER/AR positive and HER-2/bishop negative.  Patient also had an ultrasound of the axilla which was concerning for an abnormal left axillary lymph node with cortical thickening. She apparently had an FNA of the left axillary nodule which was positive for malignancy.  On 2017 patient underwent left modified radical mastectomy, right prophylactic mastectomy and immediate breast reconstruction. She also underwent right prophylactic mastectomy.  We have had her on records suggest that patient did have multifocal disease with pT2 pN1 aM0.  The largest focus measured 3.5 cm.  2 of 11 lymph nodes were positive for metastatic disease some with extracapsular extension.  Notes that patient did receive Arimidex neoadjuvant  from 2017 to 2018 prior to her bilateral mastectomies.    Review of her note suggest that she developed cough which she attributed to anastrozole and patient was then placed on tamoxifen.  She had MammaPrint  testing which returned with low risk for relapse.    Her postop course was also complicated by left chest wall abscess which resulted in I&D and removal of the left tissue expander. She was referred for radiation treatment boards ultimately declined.    Patient was then placed on tamoxifen in April 2018 which she stopped after less than a month due to nausea and vomiting.  Patient in the interim also was diagnosed with chronic hepatitis C was seen by the hepatologist.  Tamoxifen was dose reduced to 10 mg daily with the goal to increase to 20 mg daily.      Patient has relocated to [facility].  She has transitioned her care to us now.  She is currently not on any antiestrogen therapy.     Her bilateral mastectomy specimen are available for review    1/29/2021 patient had bone density which showed osteoporosis  Patient was seen by neurosurgery and had a bone scan done in March 2021 which showed subtle activity in the inferior L4 vertebral body appears to correlate with new area of sclerosis on CT of the abdomen and pelvis.  There is also subtle activity with possible new lesion at the posterior left sacrum.  These findings were concerning for metastatic disease.  PET CT scan was recommended to further evaluate.  4/8/2021 patient had a PET CT scan which showed evidence of disease in the neck chest abdomen and pelvis.  But she has a 1.1 cm mixed lytic/sclerotic hypermetabolic lesion within the left hemisacrum consistent with metastatic disease.  There is also accumulation within the inferior endplate of the L4 vertebral body thought to correspond to 1.2 centimeters sclerotic lesion consistent with metastatic disease.  There is a tiny hypermetabolic focus within the L3, T11.  Suspicious for also early metastatic disease.  4/26/2021 patient underwent CT-guided biopsy of sacral lesion, pathology was consistent with metastatic breast cancer ER and MS positive HER-2/bishop was negative.  7/6/21 CT new sclerosis within the  right 12th rib posteriorly which could represent metastatic lesion or a healed or healing fracture, new sclerosis within the right 12th rib posteriorly which         could represent metastatic lesion,new sclerosis within the right T8 transverse process worrisome   for metastatic        disease progression.  7/7/21 complaints of 8/10 back pain and 8/10 LUQ pain. Referral to radiation for questionable mets on CT chest.  7/26/2021 patient had CT scan of the head with contrast with did not show any evidence of metastatic disease.  CT scan of the chest abdomen and pelvis did not show any evidence of progressive disease.  7/26/2021 patient had CEA level which shows declining values CA 15-3 has decreased to 62 from 84  11/3/2021: Patient had CT scan of the chest, abdomen and pelvis. In the chest there were no suspicious pulmonary nodules. There is no clear evidence of progressive disease  12/13/2021 patient had a bone scan which showed uptake along the posterior right ribs consistent with rib fractures.  There is mild uptake in the L4 vertebral body corresponding to the sclerotic lesion seen on previous CT scan.  This was likely due to metastatic disease  10/6/2022: Patient has been lost to follow-up.  She stopped Ibrance due to pulmonary issues.  Apparently patient went to the emergency room and was told that Ibrance was causing lung damage.  October 6, 2022: She has a increasing CA 15-3 and CA 27.29 as well as CEA level.  10/19/2022: Patient had guardant 360 testing done which was negative  10/31/2022: Patient had bone scan which basically showed evidence for mild progression of multifocal osseous metastatic disease.  There appears to be new activity corresponding to the thoracic cervical spine, left scapula, left sacrum and left proximal femur.  CT scan of the chest otherwise is stable.  There is a nonobstructing stone on the left kidney.        Past Medical History:   Diagnosis Date    Allergic     Bone cancer      METASTATIC BONE    Bradycardia     SECONDARY TO ABLATION    Breast cancer     mets to lymph nodes; did not do radiation    Cancer of unknown origin     Compression fx, thoracic spine, open, initial encounter     Coronary artery disease     COVID-19 2021    Diabetes mellitus     Heart disease, unspecified     Hepatitis C     RESOLVED WITH MEDICATION    Hyperlipidemia     Hypertension     Obesity (BMI 30-39.9) 2021    Rib fracture     Per patient    Sleep apnea     no machine    Tachycardia     ATRIAL    Type 2 diabetes mellitus 2017       Past Surgical History:   Procedure Laterality Date    BACK SURGERY      neck X 2    BREAST RECONSTRUCTION Bilateral     CARDIAC ABLATION       atrial tachycardia x 5 ablations     CARDIAC CATHETERIZATION      CARDIAC ELECTROPHYSIOLOGY PROCEDURE N/A 2023    Procedure: Pacemaker RV lead insertion with generator change out. [employer];  Surgeon: Steven Hammond MD;  Location: [facility] CATH INVASIVE LOCATION;  Service: Cardiovascular;  Laterality: N/A;    CARDIAC SURGERY      stent placed in aorta    CARDIAC SURGERY      6 surgeries as baby     CERVICAL FUSION ANTERIOR WITH ARTIFICIAL DISCECTOMY IMPLANTATION N/A 2020    Procedure: C4 VERTEBRECTOMY AND ANTERIOR CERIVCAL DISCECTOMY WITH FUSION OF CERVICAL THREE THROUGH FIVE WITH REMOVAL OF HARDWARE C5-C6;  Surgeon: Mark Kaba MD;  Location: [facility] MAIN OR;  Service: Neurosurgery;  Laterality: N/A;     SECTION      x2    COLONOSCOPY N/A 10/23/2020    Procedure: COLONOSCOPY WITH POLYPECTOMY X6;  Surgeon: Stanley Bourne MD;  Location: [facility] ENDOSCOPY;  Service: Gastroenterology;  Laterality: N/A;  POLYPS, INTERNAL HEMORRHOIDS    ENDOMETRIAL ABLATION      REMOVAL SCAR TISSUE UTERINE    ENDOSCOPY N/A 10/23/2020    Procedure: ESOPHAGOGASTRODUODENOSCOPY WITH BIOPSY X 1 AREA;  Surgeon: Stanley Bourne MD;  Location: [facility] ENDOSCOPY;  Service: Gastroenterology;  Laterality:  "N/A;  GASTRITIS, ESOPHAGITIS, HIATAL HERNIA    INSERT / REPLACE / REMOVE PACEMAKER      KNEE SURGERY  2000    MASTECTOMY Bilateral     NECK SURGERY      PACEMAKER IMPLANTATION      PAIN PUMP INSERTION/REVISION N/A 04/05/2022    Procedure: PAIN PUMP INSERTION AND INTRATHECAL CATHETER PLACEMENT;  Surgeon: Chuck Hunter MD;  Location: [facility] MAIN OR;  Service: Pain Management;  Laterality: N/A;         Current Outpatient Medications:     azithromycin (Zithromax) 500 MG tablet, Take 1 tablet by mouth Daily for 3 days., Disp: 3 tablet, Rfl: 0    Blood Glucose Monitoring Suppl (Accu-Chek Araceli) device, Use as instructed   To test blood sugar bid, Disp: 1 each, Rfl: 0    Calcium Carbonate-Vit D-Min (Calcium 600+D Plus Minerals) 600-400 MG-UNIT chewable tablet, Chew 600 mg 2 (Two) Times a Day., Disp: 60 each, Rfl: 6    Continuous Blood Gluc  (FreeStyle Rajeev 14 Day [place]) device, 1 each Daily., Disp: 1 each, Rfl: 0    Continuous Blood Gluc Sensor (FreeStyle Rajeev 14 Day Sensor) misc, 1 each Daily., Disp: 6 each, Rfl: 3    cyclobenzaprine (FLEXERIL) 10 MG tablet, Take 1 tablet by mouth 2 (Two) Times a Day As Needed for Muscle Spasms., Disp: 30 tablet, Rfl: 1    exemestane (AROMASIN) 25 MG tablet, Take 1 tablet by mouth Daily., Disp: , Rfl:     famotidine (PEPCID) 20 MG tablet, Take 1 tablet by mouth 2 (Two) Times a Day As Needed for Heartburn., Disp: , Rfl:     ibuprofen (ADVIL,MOTRIN) 800 MG tablet, Take 1 tablet by mouth Every 6 (Six) Hours As Needed for Mild Pain., Disp: 90 tablet, Rfl: 0    insulin NPH-insulin regular (humuLIN 70/30,novoLIN 70/30) (70-30) 100 UNIT/ML injection, Inject 5 Units under the skin into the appropriate area as directed Every Evening. If BS over 180, Disp: , Rfl:     Insulin Pen Needle (B-D UF III MINI PEN NEEDLES) 31G X 5 MM misc, Use to inject insulin twice daily   DX: E11.9, Disp: 100 each, Rfl: 0    Insulin Syringe-Needle U-100 28G X 1/2\" 1 ML misc, 1 each 2 (Two) Times a Day., " Disp: 100 each, Rfl: 6    losartan (Cozaar) 50 MG tablet, Take 1 tablet by mouth Daily. Discontinue the lisinopril due to interaction with new chemotherapy, Disp: 90 tablet, Rfl: 1    Morphine Sulfate, PF, 8 MG/ML solution, 8.5 mg by Intrathecal Infusion route Daily. Receives 8 mg through pain pump q 24 hrs, Disp: , Rfl:     rosuvastatin (Crestor) 20 MG tablet, Take 1 tablet by mouth Every Night., Disp: 90 tablet, Rfl: 0    Allergies   Allergen Reactions    Oxycodone Nausea And Vomiting    Promethazine Other (See Comments)     Hyperactive mean    Tape Other (See Comments)     .blisters         Family History   Problem Relation Age of Onset    Heart disease Mother     Stroke Mother     Lung cancer Mother     Aneurysm Father     Diabetes Sister     No Known Problems Brother     No Known Problems Brother     Diabetes type I Half-Sister     Thyroid cancer Half-Sister     Cancer Maternal Aunt     Heart attack Sister     Thyroid disease Sister     No Known Problems Sister        Cancer-related family history includes Cancer in her maternal aunt; Lung cancer in her mother; Thyroid cancer in her half-sister.    Social History     Tobacco Use    Smoking status: Never     Passive exposure: Never    Smokeless tobacco: Never   Vaping Use    Vaping status: Never Used   Substance Use Topics    Alcohol use: Not Currently     Comment: drinks rarely    Drug use: Never       I have reviewed and confirmed the accuracy of the patient's history: Chief complaint, HPI, ROS, and Subjective as entered by the MA/LPN/RN. Мария Nunez MD 03/28/24          SUBJECTIVE:    Patient could not tolerate Afinitor.  She has not started Aromasin    She is on abemaciclib stable.  Has had some diarrhea but she is able to drink fluids.    She had a pacemaker placed last week    Patient has dysgusia, she otherwise denies any other issues.  She was diagnosed with sinus infection about a few days ago currently on antibiotics      Gladys Garrison  "reports a pain score of 8.  Given her pain assessment as noted, treatment options were discussed and the following options were decided upon as a follow-up plan to address the patient's pain: continuation of current treatment plan for pain and referral to specialist for assistance in pain treatment guidance.       REVIEW OF SYSTEMS:     Review of Systems   Constitutional:  Negative for chills and fever.   HENT:  Negative for ear pain, mouth sores, nosebleeds and sore throat.    Eyes:  Negative for photophobia and visual disturbance.   Respiratory:  Negative for wheezing and stridor.    Cardiovascular:  Negative for chest pain and palpitations.   Gastrointestinal:  Negative for abdominal pain, diarrhea, nausea and vomiting.   Endocrine: Negative for cold intolerance and heat intolerance.   Genitourinary:  Negative for dysuria and hematuria.   Musculoskeletal:  Negative for joint swelling and neck stiffness.   Skin:  Negative for color change and rash.   Neurological:  Negative for seizures and syncope.   Hematological:  Negative for adenopathy.        No obvious bleeding   Psychiatric/Behavioral:  Negative for agitation, confusion and hallucinations.          OBJECTIVE:    Vitals:    03/28/24 1540   BP: 121/72   Pulse: 71   Resp: 18   Temp: 98 °F (36.7 °C)   TempSrc: Infrared   SpO2: 97%   Weight: 54.9 kg (121 lb)   Height: 152.4 cm (60\")   PainSc:   8   PainLoc: Generalized             Body mass index is 23.63 kg/m².    ECOG    (1) Restricted in physically strenuous activity, ambulatory and able to do work of light nature    Physical Exam  Vitals and nursing note reviewed.   Constitutional:       General: She is not in acute distress.     Appearance: Normal appearance. She is not diaphoretic.   HENT:      Head: Normocephalic and atraumatic.      Mouth/Throat:      Mouth: Mucous membranes are moist.      Pharynx: Oropharynx is clear.   Eyes:      General: No scleral icterus.        Right eye: No discharge.         " Left eye: No discharge.      Extraocular Movements: Extraocular movements intact.      Conjunctiva/sclera: Conjunctivae normal.   Neck:      Thyroid: No thyromegaly.   Cardiovascular:      Rate and Rhythm: Normal rate and regular rhythm.      Pulses: Normal pulses.      Heart sounds: Normal heart sounds.      No friction rub. No gallop.   Pulmonary:      Effort: Pulmonary effort is normal. No respiratory distress.      Breath sounds: Normal breath sounds. No stridor. No wheezing.   Abdominal:      General: Bowel sounds are normal. There is distension.      Palpations: Abdomen is soft. There is no mass.      Tenderness: There is abdominal tenderness. There is guarding (Left upper abdomen). There is no rebound.   Musculoskeletal:         General: No tenderness. Normal range of motion.      Cervical back: Normal range of motion and neck supple.      Right lower leg: No edema.      Left lower leg: No edema.      Comments: Neck pain.  Patient has a neck collar.   Lymphadenopathy:      Cervical: No cervical adenopathy.   Skin:     General: Skin is warm and dry.      Capillary Refill: Capillary refill takes less than 2 seconds.      Findings: No bruising, erythema or rash.   Neurological:      Mental Status: She is alert and oriented to person, place, and time.      Cranial Nerves: No cranial nerve deficit.      Sensory: No sensory deficit.      Motor: No abnormal muscle tone.   Psychiatric:         Mood and Affect: Mood normal.         Behavior: Behavior normal.         Thought Content: Thought content normal.         Judgment: Judgment normal.       I have reexamined the patient and the results are consistent with the previously documented exam. Мария Nunez MD      RECENT LABS    WBC   Date Value Ref Range Status   03/28/2024 3.01 (L) 3.40 - 10.80 10*3/mm3 Final     RBC   Date Value Ref Range Status   03/28/2024 4.20 3.77 - 5.28 10*6/mm3 Final     Hemoglobin   Date Value Ref Range Status   03/28/2024 13.2 12.0  - 15.9 g/dL Final     Hematocrit   Date Value Ref Range Status   03/28/2024 39.2 34.0 - 46.6 % Final     MCV   Date Value Ref Range Status   03/28/2024 93.3 79.0 - 97.0 fL Final     MCH   Date Value Ref Range Status   03/28/2024 31.4 26.6 - 33.0 pg Final     MCHC   Date Value Ref Range Status   03/28/2024 33.7 31.5 - 35.7 g/dL Final     RDW   Date Value Ref Range Status   03/28/2024 14.5 12.3 - 15.4 % Final     RDW-SD   Date Value Ref Range Status   03/28/2024 47.4 37.0 - 54.0 fl Final     MPV   Date Value Ref Range Status   03/28/2024 9.8 6.0 - 12.0 fL Final     Platelets   Date Value Ref Range Status   03/28/2024 90 (L) 140 - 450 10*3/mm3 Final     Neutrophil %   Date Value Ref Range Status   03/28/2024 70.3 42.7 - 76.0 % Final     Lymphocyte %   Date Value Ref Range Status   03/28/2024 20.3 19.6 - 45.3 % Final     Monocyte %   Date Value Ref Range Status   03/28/2024 8.0 5.0 - 12.0 % Final     Eosinophil %   Date Value Ref Range Status   03/28/2024 0.7 0.3 - 6.2 % Final     Basophil %   Date Value Ref Range Status   03/28/2024 0.7 0.0 - 1.5 % Final     Immature Grans %   Date Value Ref Range Status   03/25/2024 0.4 0.0 - 0.5 % Final     Neutrophils, Absolute   Date Value Ref Range Status   03/28/2024 2.12 1.70 - 7.00 10*3/mm3 Final     Lymphocytes, Absolute   Date Value Ref Range Status   03/28/2024 0.61 (L) 0.70 - 3.10 10*3/mm3 Final     Monocytes, Absolute   Date Value Ref Range Status   03/28/2024 0.24 0.10 - 0.90 10*3/mm3 Final     Eosinophils, Absolute   Date Value Ref Range Status   03/28/2024 0.02 0.00 - 0.40 10*3/mm3 Final     Basophils, Absolute   Date Value Ref Range Status   03/28/2024 0.02 0.00 - 0.20 10*3/mm3 Final     Immature Grans, Absolute   Date Value Ref Range Status   03/25/2024 0.01 0.00 - 0.05 10*3/mm3 Final     nRBC   Date Value Ref Range Status   03/25/2024 0.0 0.0 - 0.2 /100 WBC Final       Lab Results   Component Value Date    GLUCOSE 108 (H) 03/25/2024    BUN 15 03/25/2024    CREATININE  0.83 03/25/2024    EGFRIFNONA 70 12/20/2021    EGFRIFAFRI >60 10/12/2018    BCR 18.1 03/25/2024    K 3.4 (L) 03/25/2024    CO2 20.0 (L) 03/25/2024    CALCIUM 8.7 03/25/2024    PROTENTOTREF 7.4 12/14/2020    ALBUMIN 3.9 03/25/2024    LABIL2 0.9 12/14/2020    AST 20 03/25/2024    ALT 10 03/25/2024       ASSESSMENT:    Metastatic breast cancer presenting as 1.1 cm mixed lytic/sclerotic hypermetabolic lesion in the left hemisacrum suspicious for metastatic disease 1.2 cm sclerotic lesion on L4, small L3 lesion and T11 lesion.  Tumor is ER positive, IL positive and HER-2/bishop negative.  Patient was prescribed combination of Ibrance and Arimidex.  Continued Ibrance due to pulmonary toxicity.  She was then placed on single agent Arimidex.  Upon progression on Arimidex was switched to Faslodex.  She developed L1 vertebral body progressive disease on Faslodex for that reason we will discontinue Faslodex and begin combination of Aromasin and Afinitor.  Patient took Afinitor for 3 days and discontinued due to nausea and fatigue.  [id number] does not show any actionable mutation.  She does not have any soft tissue for us to biopsy for additional NGS testing.  She is currently on Aromasin and abemaciclib stable.  Continue the same  Abemaciclib induced diarrhea: This has improved with occasional Imodium  Fatigue secondary to abemaciclib stable   Thrombocytopenia due to abemaciclib  Sinusitis : On antibiotics. On  Z pack  Bone metastasis:.  Biphosphonate's has been recommended patient has not been able to have due to ongoing dental issues  Right foot swelling: Doppler study was negative  History of medical noncompliance  Pancytopenia secondary to Ibrance: Improved off Ibrance  L1 vertebral body involvement.  Currently undergoing XRT.  Patient is also established with Dr. Ruff.  MRI of the spine has been scheduled for October 2023  Pain management  ypT2 N1 aM0 status post left modified radical mastectomy with lymph node  dissection and right prophylactic mastectomy in 2017 performed at [facility].  ER positive, IN positive and HER-2/bishop negative.  Status post bilateral chest wall reconstruction.  According to patient, she tolerated Arimidex very well except that she also had osteoporosis therefore Arimidex was discontinued at that time  Intolerance of tamoxifen in the past  Osteoporosis: We will transition to Xgeva since she has bone metastases  Status post neoadjuvant Arimidex prior to bilateral mastectomies  Thrombocytopenia: Work-up was - December 2020  Neck pain status post cervical spine surgery: Resolved  Complex cardiac medical history including tetralogy of Fallot, coarctation of aorta, VSD status post repair.  Status post stent placement for coarctation of aorta  Personal history and strong family history of breast cancer in multiple in the relatives on paternal side of the family including 4 paternal aunts in their 30s and 40s and 2 maternal aunts at age of 20s and 50s.  There is concern for possible hereditary breast cancer syndrome.  Patient may be  interested in pursuing cancer genetics for her management.  Assessment has been reviewed and updated          Discussion    Patient has metastatic breast cancer estrogen and progesterone dependent and HER-2/bishop negative.  She is currently on Arimidex.  We will add Ibrance.  We will also discontinue Prolia and begin Xgeva to help reduce skeletal events.           Plans:     Continue current treatment Aromasin and Abemaciclib  Complete her course of Z-Marcelino  CBC reviewed, CMP ordered  Pancytopenia is due to Verzenio  MT pharmacist follow-up with her next week  Checked tumor markers for CEA CA 15-3 and CA 27.29 reviewed  She has completed  palliative XRT to her lumbar spine  Follow-up with Dr. Ruff with neurosurgical services  [place] [id number] for NGS testing was negative for any actionable mutations  Follow-up with pain management with Dr. Hunter  Guardishante  360 does not show any actionable mutation.  Would consider soft tissue biopsy at time of progression for additional NGS testing.  Reviewed with patient  Referred to pulmonary for her dyspnea: Dr. Julio.  Appointment is pending  Follow-up with pain management for ongoing pain issues  Follow-up with /counselor   Reviewed work-up for thrombocytopenia which was negative  Reviewed her bone density which showed osteoporosis  Schedule Xgeva 120 mg subcu monthly once her dental procedures are completed.  She is still waiting on a ida  All questions answered  Follow-up 4 weeks  All questions answered            Patient verbalized understanding and is in agreement of the above plan.            I spent 30 total minutes, face-to-face, caring for Gladys today. 90% of this time involved counseling and/or coordination of care as documented within this note.

## 2024-03-29 ENCOUNTER — SPECIALTY PHARMACY (OUTPATIENT)
Dept: PHARMACY | Facility: HOSPITAL | Age: 64
End: 2024-03-29
Payer: MEDICARE

## 2024-03-29 RX ORDER — POTASSIUM CHLORIDE 20 MEQ/1
40 TABLET, EXTENDED RELEASE ORAL ONCE
Qty: 2 TABLET | Refills: 0 | Status: SHIPPED | OUTPATIENT
Start: 2024-03-29 | End: 2024-03-29

## 2024-03-29 NOTE — PROGRESS NOTES
Specialty Pharmacy Note: Verzenio (abemaciclib)    Gladys Garrison is a 63 y.o. female with metastatic breast cancer was seen 3/28/24 by Dr. Nunez. Per provider dictation, no changes to oral oncology regimen Verzenio (abemaciclib).  Labs Review: The CMP and CBC from 3/28/24 have been reviewed. No dose adjustments are needed for the oral specialty medication(s) based on the labs.    Specialty pharmacy will continue to follow patient.    Eda HIRSCH, PharmD    3/29/2024  15:18 EDT

## 2024-04-03 ENCOUNTER — TELEPHONE (OUTPATIENT)
Dept: NEUROSURGERY | Facility: CLINIC | Age: 64
End: 2024-04-03

## 2024-04-09 ENCOUNTER — TELEPHONE (OUTPATIENT)
Dept: ONCOLOGY | Facility: CLINIC | Age: 64
End: 2024-04-09
Payer: MEDICARE

## 2024-04-09 ENCOUNTER — TELEPHONE (OUTPATIENT)
Dept: FAMILY MEDICINE CLINIC | Facility: CLINIC | Age: 64
End: 2024-04-09
Payer: MEDICARE

## 2024-04-09 DIAGNOSIS — C50.919 MALIGNANT NEOPLASM OF FEMALE BREAST, UNSPECIFIED ESTROGEN RECEPTOR STATUS, UNSPECIFIED LATERALITY, UNSPECIFIED SITE OF BREAST: Primary | ICD-10-CM

## 2024-04-09 NOTE — TELEPHONE ENCOUNTER
Called pt to let her know that an order for a compression sleeve/glove would need to be sent to a medical supply store. She asked for it to be sent somewhere in [place]. I told her I could send it to [facility]. Pt was agreeable to this. Order entered, waiting for physician signature.

## 2024-04-09 NOTE — TELEPHONE ENCOUNTER
Caller: Gladys Garrison    Relationship: Self    Best call back number: [phone]    What is the best time to reach you: ASAP    Who are you requesting to speak with (clinical staff, provider,  specific staff member): ELVIN    What was the call regarding:  PHARMACY SUGGESTED PT TO WRAP RIGHT HAND BEFORE GETTING MEASURED FOR COMPRESSION SLEEVE - TO GET THE SWELLING DOWN.  PT IS INQUIRING IF THE SPLINT THE ED GAVE HER WILL SUFFIX OR SHOULD SHE WRAP WITH AN ACE BANDAGE?  PLEASE ADVISE    Is it okay if the provider responds through MyChart: NO

## 2024-04-09 NOTE — TELEPHONE ENCOUNTER
Caller: Gladys Garrison    Relationship: Self    Best call back number: [phone]    What is the best time to reach you: ANYTIME    Who are you requesting to speak with (clinical staff, provider,  specific staff member): CLINICAL    What was the call regarding: PT IS REQUESTING A PRESCRIPTION TO GET A COMPRESSION SLEEVE THAT WILL GO DOWN TO HER FINGERS.  PLEASE ADVISE     [place] [address] [address] [facility] [phone] [place] [phone] FX

## 2024-04-11 NOTE — TELEPHONE ENCOUNTER
Returned pt's call. I let her know that she can wrap her arm with an ace bandage to help with the swelling. I also let her know that the order for the compression sleeve/glove has been faxed to [facility]. Pt verbalized understanding.

## 2024-04-12 RX ORDER — IBUPROFEN 800 MG/1
800 TABLET ORAL EVERY 6 HOURS PRN
Qty: 90 TABLET | Refills: 0 | Status: SHIPPED | OUTPATIENT
Start: 2024-04-12

## 2024-04-12 NOTE — TELEPHONE ENCOUNTER
Patient going on a road trip to [place] and sitting allot hurts her very much. Patient is requesting oral pain medication to help with the trip

## 2024-04-15 ENCOUNTER — CLINICAL SUPPORT NO REQUIREMENTS (OUTPATIENT)
Dept: CARDIOLOGY | Facility: CLINIC | Age: 64
End: 2024-04-15
Payer: MEDICARE

## 2024-04-15 ENCOUNTER — OFFICE VISIT (OUTPATIENT)
Dept: CARDIOLOGY | Facility: CLINIC | Age: 64
End: 2024-04-15
Payer: MEDICARE

## 2024-04-15 VITALS
WEIGHT: 123 LBS | RESPIRATION RATE: 18 BRPM | HEART RATE: 65 BPM | HEIGHT: 60 IN | BODY MASS INDEX: 24.15 KG/M2 | SYSTOLIC BLOOD PRESSURE: 144 MMHG | DIASTOLIC BLOOD PRESSURE: 79 MMHG

## 2024-04-15 DIAGNOSIS — I44.2 AV BLOCK, COMPLETE: Primary | ICD-10-CM

## 2024-04-15 DIAGNOSIS — Z95.0 PACEMAKER: ICD-10-CM

## 2024-04-15 DIAGNOSIS — I27.20 PULMONARY HYPERTENSION: ICD-10-CM

## 2024-04-15 DIAGNOSIS — I49.5 SICK SINUS SYNDROME: Primary | ICD-10-CM

## 2024-04-15 DIAGNOSIS — I44.2 COMPLETE HEART BLOCK: ICD-10-CM

## 2024-04-15 DIAGNOSIS — Q21.3 TETRALOGY OF FALLOT: ICD-10-CM

## 2024-04-15 DIAGNOSIS — I36.1 NONRHEUMATIC TRICUSPID VALVE REGURGITATION: ICD-10-CM

## 2024-04-17 NOTE — PROGRESS NOTES
DEVICE INTERROGATION:  IN OFFICE    DEVICE TYPE:   Biventricular pacemaker  :   [employer]    BATTERY:  Stable    TIME TO ELECTIVE REPLACEMENT INDICATORS:   7.7 years    CHARGE TIME:   Not applicable        LEAD DATA:       DEVICE REPROGRAMMED FOR TESTING      Atrial:   0.6 mV, 342 ohms, 0.875 V@0.4 ms    Ventricular:     PAC ED mV, 380 ohms, 0.75 V@0.4 ms    LV: 551 ohms, 1.0 V at 0.4 ms      Pacemaker dependent:   Yes      Atrial pacing percentage: 69%    Ventricular pacing percentage: 99.8%      Arrhythmia Logbook Reviewed: Reported A-fib appears to be far field R wave sensing in the atrial lead        Summary:    Stable Device Function    No significant arhythmia burden.     Battery status is stable.    Reviewed with: Dr. Lopez      NEXT IN OFFICE DEVICE CHECK DUE: At next visit    REMOTE DEVICE INTERROGATIONS: Ongoing

## 2024-04-23 NOTE — PROGRESS NOTES
Cardiology Clinic Note  Stanley Lopez MD, PhD    Subjective:     Encounter Date:04/15/2024      Patient ID: Gladys Garrison is a 63 y.o. female.    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  Last 2D echo March 2023 revealed normal EF 55 to 60% with grade 1 diastolic dysfunction, severely reduced RV systolic function with moderately dilated RV and RA, moderate bileaflet mitral valve thickening with severe TR, mildly elevated pulmonary pressures 35-45, trivial pericardial effusion.  She underwent device exchange with Medtronic generator December 2023, Medtronic leads were confirmed for RV and RA leads which were placed back in 2007.  She also has metastatic breast cancer diagnosed 2017 with bone mets diagnosed in 2021, history of bilateral mastectomy.  Under therapy evaluation also pain management from oncology standpoint.   She also history of COVID 2021 she was admitted recently in February 2023 with chest pain atypical nature thought to be secondary to metastatic disease.   She has no history of obstructive CAD or coronary intervention.  she continues with some frailty, difficulty eating, weight loss with chemotherapy.  She does not appear fluid overloaded today blood pressures are okay at home less than 140 systolic     Repeat device interrogation reveals AV paced rhythm with no atrial fibrillation.  7.7 years of battery life remaining,         Review of systems otherwise negative x14 point review of systems except as mentioned above        Historical data copied forward  from previous encounters in EMR including the history, exam, and assessment/plan has been reviewed and is unchanged unless noted otherwise.     Cardiac medicines reviewed with risk, benefits, and necessity of each discussed.     Risk and benefit of cardiac testing reviewed including death heart attack stroke pain bleeding infection need for vascular /cardiovascular surgery were discussed and the patient      Objective:         Objective    Vitals reviewed in EMR     Physical Exam  Regular rate and rhythm with no rubs gallops, RV heave no lift noted  2 out of 6 systolic ejection murmur left sternal border stable  No carotid bruits, positive V wave  No clubbing cyanosis or edema  Normal pulses normal cap refill  Skin, dry  Device site clean and dry  Assessment:         Assessment     Independently manage and assessed medical conditions   Essential hypertension  Hyperlipidemia  Type 2 diabetes  Metastatic breast cancer with bone mets  History of bilateral mastectomy  History of pacemaker 2007 with generator change 2023, AV device Medtronic dual-chamber with Medtronic leads  History of Tetralogy of Fallot status postsurgical repair as a child of VSD  RV systolic dysfunction likely secondary to severe TR is a late complication of tetralogy as well as RV lead across the valve, no echo evidence of residual VSD  RV systolic dysfunction has been known for quite some time even examining echo was back in 2021  Anorexia and weight loss from chemotherapy       Continue aspirin  Diabetes medications goal A1c less than 7 with insulin  Losartan 50 daily, goal blood pressure less than 140 systolic  Crestor 20 daily  Off beta-blockers with AV paced rhythm RV systolic dysfunction  Likely not much to be done for RV dysfunction in the setting, not a candidate for tricuspid valve repair or replacement in the setting, chronic pacing likely contributory in addition to congenital heart disease  Status post VSD repair in childhood secondary  "to tetralogy, likely postprocedural complete heart block requiring AV paced rhythm longstanding, generator change as above  Follow-up with oncology, unclear overall prognosis, continues on chemotherapy, weight down another 10 pounds from prior  Continue pain management with stress fractures, metastatic bone pain     MRI evaluation okay from device standpoint, Medtronic MRI compatible device and leads  She has had multiple MRIs in the past  She has been dependent on the device for quite some time secondary to multiple surgical and ablation interventions  Recommended call Med[employer] rep to be present at the time of cardiac MRI or other procedures with device setting optimization as well as evaluation of escape rhythm underneath the device settings      See her back in 6 months     Stanley Lopez MD, PhD    Objective:         /79 (BP Location: Right arm, Patient Position: Sitting)   Pulse 65   Resp 18   Ht 152.4 cm (60\")   Wt 55.8 kg (123 lb)   LMP  (LMP Unknown)   BMI 24.02 kg/m²     Physical Exam    Assessment:         There are no diagnoses linked to this encounter.       Plan:              The pleasure to be involved in this patient's cardiovascular care.  Please call with any questions or concerns  Stanley Lopez MD, PhD    Most recent EKG as reviewed and interpreted by me:  Procedures     Most recent echo as reviewed and interpreted by me:  Results for orders placed during the hospital encounter of 03/07/23    Adult Transthoracic Echo Complete W/ Cont if Necessary Per Protocol    Interpretation Summary    Left ventricular systolic function is normal. Left ventricular ejection fraction appears to be 56 - 60%.    Left ventricular diastolic function is consistent with (grade Ia w/high LAP) impaired relaxation.    Severely reduced right ventricular systolic function noted.    The right ventricular cavity is moderately dilated.    The right atrial cavity is moderate to severely  dilated.    There is " moderate, bileaflet mitral valve thickening present.    Severe tricuspid valve regurgitation is present.    Estimated right ventricular systolic pressure from tricuspid regurgitation is mildly elevated (35-45 mmHg).    Mild pulmonary hypertension is present.    There is a trivial pericardial effusion. There is no evidence of cardiac tamponade.      Most recent stress test as reviewed and interpreted by me:      Most recent cardiac catheterization as reviewed interpreted by me:  No results found for this or any previous visit.    The following portions of the patient's history were reviewed and updated as appropriate: allergies, current medications, past family history, past medical history, past social history, past surgical history, and problem list.      ROS:  14 point review of systems negative except as mentioned above    Current Outpatient Medications:     Calcium Carbonate-Vit D-Min (Calcium 600+D Plus Minerals) 600-400 MG-UNIT chewable tablet, Chew 600 mg 2 (Two) Times a Day., Disp: 60 each, Rfl: 6    cyclobenzaprine (FLEXERIL) 10 MG tablet, Take 1 tablet by mouth 2 (Two) Times a Day As Needed for Muscle Spasms., Disp: 30 tablet, Rfl: 1    exemestane (AROMASIN) 25 MG tablet, Take 1 tablet by mouth Daily., Disp: , Rfl:     famotidine (PEPCID) 20 MG tablet, Take 1 tablet by mouth 2 (Two) Times a Day As Needed for Heartburn., Disp: , Rfl:     ibuprofen (ADVIL,MOTRIN) 800 MG tablet, Take 1 tablet by mouth Every 6 (Six) Hours As Needed for Mild Pain., Disp: 90 tablet, Rfl: 0    insulin NPH-insulin regular (humuLIN 70/30,novoLIN 70/30) (70-30) 100 UNIT/ML injection, Inject 5 Units under the skin into the appropriate area as directed Every Evening. If BS over 180, Disp: , Rfl:     losartan (Cozaar) 50 MG tablet, Take 1 tablet by mouth Daily. Discontinue the lisinopril due to interaction with new chemotherapy, Disp: 90 tablet, Rfl: 1    Morphine Sulfate, PF, 8 MG/ML solution, 8.5 mg by Intrathecal Infusion route  "Daily. Receives 8 mg through pain pump q 24 hrs, Disp: , Rfl:     rosuvastatin (Crestor) 20 MG tablet, Take 1 tablet by mouth Every Night., Disp: 90 tablet, Rfl: 0    Blood Glucose Monitoring Suppl (Accu-Chek Araceli) device, Use as instructed   To test blood sugar bid, Disp: 1 each, Rfl: 0    Continuous Blood Gluc  (FreeStyle Rajeev 14 Day Point Lay) device, 1 each Daily., Disp: 1 each, Rfl: 0    Continuous Blood Gluc Sensor (FreeStyle Rajeev 14 Day Sensor) misc, 1 each Daily., Disp: 6 each, Rfl: 3    Insulin Pen Needle (B-D UF III MINI PEN NEEDLES) 31G X 5 MM misc, Use to inject insulin twice daily   DX: E11.9, Disp: 100 each, Rfl: 0    Insulin Syringe-Needle U-100 28G X 1/2\" 1 ML misc, 1 each 2 (Two) Times a Day., Disp: 100 each, Rfl: 6    Problem List:  Patient Active Problem List   Diagnosis    Hyperlipidemia    Hypertensive disorder    Osteoarthritis of multiple joints    Pulmonary hypertension    Pulmonary valve disorder    Malignant tumor of breast    Tetralogy of Fallot    Tricuspid valve regurgitation    Controlled type 2 diabetes mellitus without complication, with long-term current use of insulin    Vitamin D deficiency    Acquired spondylolisthesis of cervical vertebra    Adjacent segment disease with spinal stenosis    Cervical spondylosis with myelopathy    Cervical myelopathy with cervical radiculopathy    Osteoporosis    S/P cervical spinal fusion    Lesion of lumbar spine    Atherosclerosis of coronary artery of native heart without angina pectoris    Rib pain on right side    Memory loss of unknown cause    Malignant neoplasm of female breast    Malignant neoplasm metastatic to bone    Supraventricular tachycardia    Thrombocytopenia    Systolic congestive heart failure    COVID-19    Elevated AST (SGOT)    Hyponatremia    Pacemaker    Sick sinus syndrome    AV block, complete    Cancer associated pain    Dyspnea    Pain in left knee    Abnormal radiographic examination    Acute sinusitis    " Allergic rhinitis    Constipation    Diarrhea    Nausea    Disorder of aorta    Dizziness and giddiness    Headache    Lack of energy    Syncope    Pacemaker complications    Fall against object    Complete heart block     Past Medical History:  Past Medical History:   Diagnosis Date    Allergic     Bone cancer     METASTATIC BONE    Bradycardia     SECONDARY TO ABLATION    Breast cancer 2017    mets to lymph nodes; did not do radiation    Cancer of unknown origin     Compression fx, thoracic spine, open, initial encounter     Coronary artery disease     COVID-19 2021    Diabetes mellitus     Heart disease, unspecified     Hepatitis C     RESOLVED WITH MEDICATION    Hyperlipidemia     Hypertension     Obesity (BMI 30-39.9) 2021    Rib fracture     Per patient    Sleep apnea     no machine    Tachycardia     ATRIAL    Type 2 diabetes mellitus 2017     Past Surgical History:  Past Surgical History:   Procedure Laterality Date    BACK SURGERY      neck X 2    BREAST RECONSTRUCTION Bilateral     CARDIAC ABLATION       atrial tachycardia x 5 ablations     CARDIAC CATHETERIZATION      CARDIAC ELECTROPHYSIOLOGY PROCEDURE N/A 2023    Procedure: Pacemaker RV lead insertion with generator change out. [employer];  Surgeon: Steven Hammond MD;  Location: [facility] CATH INVASIVE LOCATION;  Service: Cardiovascular;  Laterality: N/A;    CARDIAC SURGERY      stent placed in aorta    CARDIAC SURGERY      6 surgeries as baby     CERVICAL FUSION ANTERIOR WITH ARTIFICIAL DISCECTOMY IMPLANTATION N/A 2020    Procedure: C4 VERTEBRECTOMY AND ANTERIOR CERIVCAL DISCECTOMY WITH FUSION OF CERVICAL THREE THROUGH FIVE WITH REMOVAL OF HARDWARE C5-C6;  Surgeon: Mark Kaba MD;  Location: [facility] MAIN OR;  Service: Neurosurgery;  Laterality: N/A;     SECTION      x2    COLONOSCOPY N/A 10/23/2020    Procedure: COLONOSCOPY WITH POLYPECTOMY X6;  Surgeon: Stanley Bourne MD;  Location: [facility]  ENDOSCOPY;  Service: Gastroenterology;  Laterality: N/A;  POLYPS, INTERNAL HEMORRHOIDS    ENDOMETRIAL ABLATION      REMOVAL SCAR TISSUE UTERINE    ENDOSCOPY N/A 10/23/2020    Procedure: ESOPHAGOGASTRODUODENOSCOPY WITH BIOPSY X 1 AREA;  Surgeon: Stanley Bourne MD;  Location: [facility] ENDOSCOPY;  Service: Gastroenterology;  Laterality: N/A;  GASTRITIS, ESOPHAGITIS, HIATAL HERNIA    INSERT / REPLACE / REMOVE PACEMAKER      KNEE SURGERY  2000    MASTECTOMY Bilateral     NECK SURGERY      PACEMAKER IMPLANTATION      PAIN PUMP INSERTION/REVISION N/A 04/05/2022    Procedure: PAIN PUMP INSERTION AND INTRATHECAL CATHETER PLACEMENT;  Surgeon: Chuck Hunter MD;  Location: [facility] MAIN OR;  Service: Pain Management;  Laterality: N/A;     Social History:  Social History     Socioeconomic History    Marital status: Legally     Number of children: 2   Tobacco Use    Smoking status: Never     Passive exposure: Never    Smokeless tobacco: Never   Vaping Use    Vaping status: Never Used   Substance and Sexual Activity    Alcohol use: Not Currently     Comment: drinks rarely    Drug use: Never    Sexual activity: Defer     Allergies:  Allergies   Allergen Reactions    Oxycodone Nausea And Vomiting    Promethazine Other (See Comments)     Hyperactive mean    Tape Other (See Comments)     .blisters       Immunizations:  Immunization History   Administered Date(s) Administered    COVID-19 (FARTUN) 05/24/2021    Tdap 02/25/2022            In-Office Procedure(s):  No orders to display        ASCVD RIsk Score::  The ASCVD Risk score (Kyle DK, et al., 2019) failed to calculate for the following reasons:    The patient has a prior MI or stroke diagnosis    Imaging:    Results for orders placed during the hospital encounter of 03/25/24    XR Chest 1 View    Narrative  XR CHEST 1 VW    Date of Exam: 3/25/2024 5:30 PM EDT    Indication: cough    Comparison: 12/11/2023    Findings:  Lines: Unchanged position of left  subclavian triple lead pacemaker/defibrillator.    Lungs: Poor respiratory effort accentuates the pulmonary vasculature and cardiomediastinal silhouette. No definite consolidation.  Scattered patchy opacities throughout the lungs and bilateral perihilar vascular prominence.  Pleura: No large pleural effusion no pneumothorax.    Cardiomediastinum: Mild to moderate cardiomegaly. Postsurgical changes are noted in the thorax.    Soft Tissues: Unremarkable.    Bones: No acute osseous abnormality.    Impression  Impression:  Findings suggestive of chronic pulmonary edema/pulmonary vascular congestion. Superimposed multifocal pneumonia is not completely excluded.      Electronically Signed: Dc Ramirez DO  3/25/2024 5:36 PM EDT  Workstation ID: [id number]       Results for orders placed during the hospital encounter of 01/08/24    CT Chest With Contrast Diagnostic    Narrative  CT CHEST W CONTRAST DIAGNOSTIC    Date of Exam: 1/8/2024 7:48 AM EST    Indication: breast cancer.    Comparison: 12/11/2023 and 8/7/2023    Technique: Axial CT images were obtained of the chest after the uneventful intravenous administration of iodinated contrast.  Sagittal and coronal reconstructions were performed.  Automated exposure control and iterative reconstruction methods were used.      Structured Dose report sent to the ACR Radiation Dose Index Registry.    The patient has had 2 known prior CTs and cardiac nuclear medicine studies within the last 12 months..    This CT exam was performed using one or more of the following dose reduction techniques: Automated exposure control, adjustment of the mA and/or kV according to patient size, or use of iterative reconstruction technique.      Findings: Bilateral breast implants. Median sternotomy wires with mediastinal surgical clips. Cardiac pacemaker/defibrillator leads. Normal appearance of the thyroid. No mediastinal adenopathy. Severe enlargement of the main pulmonary artery  suggesting  pulmonary arterial hypertension. Stable calcified 0.8 cm nodule noted in the right lower lobe most consistent with a granuloma. Calcified mediastinal lymph nodes also appear stable. Stable 0.3 cm granuloma within the right upper lobe. Stable 0.3 cm  calcified granuloma within the right upper lobe on axial image #27. No consolidation. No pneumothorax or pleural effusion.    Limited evaluation of the upper abdomen appears normal.    Stable appearance of T12 and L1 compared with the prior study. No lytic or blastic disease is definitively identified.    Impression  Stable appearance of the chest.    Electronically Signed: Sudhir Montoya MD  1/8/2024 10:07 PM EST  Workstation ID: [id number]    Study is available in DICOM format to non-affiliated external healthcare facilities or entities on a secure, media free, reciprocally searchable basis with patient authorization for at least a 12 month period after the study. Search conducted for prior  patient CT studies completed at nonaffiliated external healthcare facilities or entities within the past 12 months and are available through a secure, authorized, media free, shared archive prior to an imaging study being performed.      Results for orders placed during the hospital encounter of 01/08/24    CT Chest With Contrast Diagnostic    Narrative  CT CHEST W CONTRAST DIAGNOSTIC    Date of Exam: 1/8/2024 7:48 AM EST    Indication: breast cancer.    Comparison: 12/11/2023 and 8/7/2023    Technique: Axial CT images were obtained of the chest after the uneventful intravenous administration of iodinated contrast.  Sagittal and coronal reconstructions were performed.  Automated exposure control and iterative reconstruction methods were used.      Structured Dose report sent to the ACR Radiation Dose Index Registry.    The patient has had 2 known prior CTs and cardiac nuclear medicine studies within the last 12 months..    This CT exam was performed using one or more of the  following dose reduction techniques: Automated exposure control, adjustment of the mA and/or kV according to patient size, or use of iterative reconstruction technique.      Findings: Bilateral breast implants. Median sternotomy wires with mediastinal surgical clips. Cardiac pacemaker/defibrillator leads. Normal appearance of the thyroid. No mediastinal adenopathy. Severe enlargement of the main pulmonary artery suggesting  pulmonary arterial hypertension. Stable calcified 0.8 cm nodule noted in the right lower lobe most consistent with a granuloma. Calcified mediastinal lymph nodes also appear stable. Stable 0.3 cm granuloma within the right upper lobe. Stable 0.3 cm  calcified granuloma within the right upper lobe on axial image #27. No consolidation. No pneumothorax or pleural effusion.    Limited evaluation of the upper abdomen appears normal.    Stable appearance of T12 and L1 compared with the prior study. No lytic or blastic disease is definitively identified.    Impression  Stable appearance of the chest.    Electronically Signed: Sudhir Montoya MD  1/8/2024 10:07 PM EST  Workstation ID: [id number]    Study is available in DICOM format to non-affiliated external healthcare facilities or entities on a secure, media free, reciprocally searchable basis with patient authorization for at least a 12 month period after the study. Search conducted for prior  patient CT studies completed at nonaffiliated external healthcare facilities or entities within the past 12 months and are available through a secure, authorized, media free, shared archive prior to an imaging study being performed.      Lab Review:   Lab on 03/28/2024   Component Date Value    WBC 03/28/2024 3.01 (L)     RBC 03/28/2024 4.20     Hemoglobin 03/28/2024 13.2     Hematocrit 03/28/2024 39.2     MCV 03/28/2024 93.3     MCH 03/28/2024 31.4     MCHC 03/28/2024 33.7     RDW 03/28/2024 14.5     RDW-SD 03/28/2024 47.4     MPV 03/28/2024 9.8     Platelets  03/28/2024 90 (L)     Neutrophil % 03/28/2024 70.3     Lymphocyte % 03/28/2024 20.3     Monocyte % 03/28/2024 8.0     Eosinophil % 03/28/2024 0.7     Basophil % 03/28/2024 0.7     Neutrophils, Absolute 03/28/2024 2.12     Lymphocytes, Absolute 03/28/2024 0.61 (L)     Monocytes, Absolute 03/28/2024 0.24     Eosinophils, Absolute 03/28/2024 0.02     Basophils, Absolute 03/28/2024 0.02     Extra Tube 03/28/2024 Hold for add-ons.     Extra Tube 03/28/2024 Hold for add-ons.     Glucose 03/28/2024 155 (H)     BUN 03/28/2024 14     Creatinine 03/28/2024 0.99     Sodium 03/28/2024 140     Potassium 03/28/2024 3.2 (L)     Chloride 03/28/2024 102     CO2 03/28/2024 26.0     Calcium 03/28/2024 9.4     Total Protein 03/28/2024 7.7     Albumin 03/28/2024 4.9     ALT (SGPT) 03/28/2024 16     AST (SGOT) 03/28/2024 27     Alkaline Phosphatase 03/28/2024 81     Total Bilirubin 03/28/2024 0.5     Globulin 03/28/2024 2.8     A/G Ratio 03/28/2024 1.8     BUN/Creatinine Ratio 03/28/2024 14.1     Anion Gap 03/28/2024 12.0     eGFR 03/28/2024 65.0    Admission on 03/25/2024, Discharged on 03/25/2024   Component Date Value    QT Interval 03/25/2024 501     QTC Interval 03/25/2024 501     Glucose 03/25/2024 108 (H)     BUN 03/25/2024 15     Creatinine 03/25/2024 0.83     Sodium 03/25/2024 144     Potassium 03/25/2024 3.4 (L)     Chloride 03/25/2024 111 (H)     CO2 03/25/2024 20.0 (L)     Calcium 03/25/2024 8.7     Total Protein 03/25/2024 6.3     Albumin 03/25/2024 3.9     ALT (SGPT) 03/25/2024 10     AST (SGOT) 03/25/2024 20     Alkaline Phosphatase 03/25/2024 63     Total Bilirubin 03/25/2024 0.4     Globulin 03/25/2024 2.4     A/G Ratio 03/25/2024 1.6     BUN/Creatinine Ratio 03/25/2024 18.1     Anion Gap 03/25/2024 13.0     eGFR 03/25/2024 80.3     Lipase 03/25/2024 20     WBC 03/25/2024 2.45 (L)     RBC 03/25/2024 3.64 (L)     Hemoglobin 03/25/2024 11.2 (L)     Hematocrit 03/25/2024 34.8     MCV 03/25/2024 95.6     MCH 03/25/2024 30.8      MCHC 03/25/2024 32.2     RDW 03/25/2024 15.4     RDW-SD 03/25/2024 53.8     MPV 03/25/2024 11.2     Platelets 03/25/2024 114 (L)     Neutrophil % 03/25/2024 79.2 (H)     Lymphocyte % 03/25/2024 13.5 (L)     Monocyte % 03/25/2024 6.1     Eosinophil % 03/25/2024 0.4     Basophil % 03/25/2024 0.4     Immature Grans % 03/25/2024 0.4     Neutrophils, Absolute 03/25/2024 1.94     Lymphocytes, Absolute 03/25/2024 0.33 (L)     Monocytes, Absolute 03/25/2024 0.15     Eosinophils, Absolute 03/25/2024 0.01     Basophils, Absolute 03/25/2024 0.01     Immature Grans, Absolute 03/25/2024 0.01     nRBC 03/25/2024 0.0     COVID19 03/25/2024 Not Detected     Influenza A PCR 03/25/2024 Not Detected     Influenza B PCR 03/25/2024 Not Detected     Strep A Ag 03/25/2024 Negative    Lab on 01/29/2024   Component Date Value    WBC 01/29/2024 1.64 (L)     RBC 01/29/2024 3.67 (L)     Hemoglobin 01/29/2024 11.1 (L)     Hematocrit 01/29/2024 35.0     MCV 01/29/2024 95.4     MCH 01/29/2024 30.2     MCHC 01/29/2024 31.7     RDW 01/29/2024 16.0 (H)     RDW-SD 01/29/2024 53.4     MPV 01/29/2024 10.1     Platelets 01/29/2024 76 (L)     Neutrophil % 01/29/2024 71.3     Lymphocyte % 01/29/2024 17.7 (L)     Monocyte % 01/29/2024 9.8     Eosinophil % 01/29/2024 0.6     Basophil % 01/29/2024 0.6     Neutrophils, Absolute 01/29/2024 1.17 (L)     Lymphocytes, Absolute 01/29/2024 0.29 (L)     Monocytes, Absolute 01/29/2024 0.16     Eosinophils, Absolute 01/29/2024 0.01     Basophils, Absolute 01/29/2024 0.01    Lab on 01/22/2024   Component Date Value    WBC 01/22/2024 0.87 (C)     RBC 01/22/2024 3.25 (L)     Hemoglobin 01/22/2024 9.7 (L)     Hematocrit 01/22/2024 31.3 (L)     MCV 01/22/2024 96.3     MCH 01/22/2024 29.8     MCHC 01/22/2024 31.0 (L)     RDW 01/22/2024 15.2     RDW-SD 01/22/2024 52.4     MPV 01/22/2024 9.5     Platelets 01/22/2024 65 (L)     Neutrophil % 01/22/2024 58.7     Lymphocyte % 01/22/2024 29.9     Monocyte % 01/22/2024 9.2      Eosinophil % 01/22/2024 1.1     Basophil % 01/22/2024 1.1     Neutrophils, Absolute 01/22/2024 0.51 (L)     Lymphocytes, Absolute 01/22/2024 0.26 (L)     Monocytes, Absolute 01/22/2024 0.08 (L)     Eosinophils, Absolute 01/22/2024 0.01     Basophils, Absolute 01/22/2024 0.01     Extra Tube 01/22/2024 Hold for add-ons.     Extra Tube 01/22/2024 Hold for add-ons.     Glucose 01/22/2024 123 (H)     BUN 01/22/2024 16     Creatinine 01/22/2024 0.88     Sodium 01/22/2024 143     Potassium 01/22/2024 4.3     Chloride 01/22/2024 109 (H)     CO2 01/22/2024 26.0     Calcium 01/22/2024 9.0     Total Protein 01/22/2024 6.3     Albumin 01/22/2024 3.8     ALT (SGPT) 01/22/2024 11     AST (SGOT) 01/22/2024 23     Alkaline Phosphatase 01/22/2024 68     Total Bilirubin 01/22/2024 0.3     Globulin 01/22/2024 2.5     A/G Ratio 01/22/2024 1.5     BUN/Creatinine Ratio 01/22/2024 18.2     Anion Gap 01/22/2024 8.0     eGFR 01/22/2024 74.9    Office Visit on 01/04/2024   Component Date Value    Color 01/04/2024 Dark Yellow     Clarity, UA 01/04/2024 Cloudy (A)     Glucose, UA 01/04/2024 Negative     Bilirubin 01/04/2024 Negative     Ketones, UA 01/04/2024 Negative     Specific Gravity  01/04/2024 1.010     Blood, UA 01/04/2024 Small (A)     pH, Urine 01/04/2024 6.0     Protein, POC 01/04/2024 Negative     Urobilinogen, UA 01/04/2024 0.2 E.U./dL     Nitrite, UA 01/04/2024 Negative     Leukocytes 01/04/2024 Moderate (2+) (A)     Urine Culture 01/04/2024 No growth    Lab on 12/18/2023   Component Date Value    WBC 12/18/2023 4.27     RBC 12/18/2023 4.06     Hemoglobin 12/18/2023 11.7 (L)     Hematocrit 12/18/2023 37.4     MCV 12/18/2023 92.1     MCH 12/18/2023 28.8     MCHC 12/18/2023 31.3 (L)     RDW 12/18/2023 14.2     RDW-SD 12/18/2023 46.3     MPV 12/18/2023 9.9     Platelets 12/18/2023 126 (L)     Neutrophil % 12/18/2023 82.7 (H)     Lymphocyte % 12/18/2023 9.6 (L)     Monocyte % 12/18/2023 5.9     Eosinophil % 12/18/2023 1.6      Basophil % 12/18/2023 0.2     Neutrophils, Absolute 12/18/2023 3.53     Lymphocytes, Absolute 12/18/2023 0.41 (L)     Monocytes, Absolute 12/18/2023 0.25     Eosinophils, Absolute 12/18/2023 0.07     Basophils, Absolute 12/18/2023 0.01     Extra Tube 12/18/2023 Hold for add-ons.     Extra Tube 12/18/2023 Hold for add-ons.     Glucose 12/18/2023 88     BUN 12/18/2023 11     Creatinine 12/18/2023 0.87     Sodium 12/18/2023 140     Potassium 12/18/2023 4.4     Chloride 12/18/2023 103     CO2 12/18/2023 25.0     Calcium 12/18/2023 9.6     Total Protein 12/18/2023 7.5     Albumin 12/18/2023 4.2     ALT (SGPT) 12/18/2023 16     AST (SGOT) 12/18/2023 31     Alkaline Phosphatase 12/18/2023 77     Total Bilirubin 12/18/2023 0.5     Globulin 12/18/2023 3.3     A/G Ratio 12/18/2023 1.3     BUN/Creatinine Ratio 12/18/2023 12.6     Anion Gap 12/18/2023 12.0     eGFR 12/18/2023 75.9    Admission on 12/11/2023, Discharged on 12/12/2023   Component Date Value    Glucose 12/11/2023 71     QT Interval 12/12/2023 479     QTC Interval 12/12/2023 479    Lab on 12/04/2023   Component Date Value    Glucose 12/04/2023 131 (H)     BUN 12/04/2023 10     Creatinine 12/04/2023 0.71     Sodium 12/04/2023 143     Potassium 12/04/2023 3.6     Chloride 12/04/2023 104     CO2 12/04/2023 28.0     Calcium 12/04/2023 9.2     Total Protein 12/04/2023 6.9     Albumin 12/04/2023 4.0     ALT (SGPT) 12/04/2023 17     AST (SGOT) 12/04/2023 28     Alkaline Phosphatase 12/04/2023 77     Total Bilirubin 12/04/2023 0.4     Globulin 12/04/2023 2.9     A/G Ratio 12/04/2023 1.4     BUN/Creatinine Ratio 12/04/2023 14.1     Anion Gap 12/04/2023 11.0     eGFR 12/04/2023 96.9     WBC 12/04/2023 1.82 (L)     RBC 12/04/2023 4.02     Hemoglobin 12/04/2023 11.7 (L)     Hematocrit 12/04/2023 36.3     MCV 12/04/2023 90.3     MCH 12/04/2023 29.1     MCHC 12/04/2023 32.2     RDW 12/04/2023 13.8     RDW-SD 12/04/2023 43.9     MPV 12/04/2023 9.4     Platelets 12/04/2023 124 (L)      Neutrophil % 12/04/2023 70.5     Lymphocyte % 12/04/2023 15.9 (L)     Monocyte % 12/04/2023 12.6 (H)     Eosinophil % 12/04/2023 0.5     Basophil % 12/04/2023 0.5     Neutrophils, Absolute 12/04/2023 1.28 (L)     Lymphocytes, Absolute 12/04/2023 0.29 (L)     Monocytes, Absolute 12/04/2023 0.23     Eosinophils, Absolute 12/04/2023 0.01     Basophils, Absolute 12/04/2023 0.01    Admission on 12/01/2023, Discharged on 12/02/2023   Component Date Value    QT Interval 12/01/2023 490     QTC Interval 12/01/2023 492     Glucose 12/01/2023 85     BUN 12/01/2023 10     Creatinine 12/01/2023 0.63     Sodium 12/01/2023 140     Potassium 12/01/2023 3.3 (L)     Chloride 12/01/2023 102     CO2 12/01/2023 24.0     Calcium 12/01/2023 9.7     BUN/Creatinine Ratio 12/01/2023 15.9     Anion Gap 12/01/2023 14.0     eGFR 12/01/2023 101.1     HS Troponin T 12/01/2023 19 (H)     Magnesium 12/01/2023 2.1     WBC 12/01/2023 2.00 (L)     RBC 12/01/2023 4.16     Hemoglobin 12/01/2023 11.8 (L)     Hematocrit 12/01/2023 36.2     MCV 12/01/2023 86.9     MCH 12/01/2023 28.3     MCHC 12/01/2023 32.5     RDW 12/01/2023 14.1     RDW-SD 12/01/2023 45.5     MPV 12/01/2023 7.3     Platelets 12/01/2023 95 (L)     Neutrophil % 12/01/2023 66.6     Lymphocyte % 12/01/2023 21.2     Monocyte % 12/01/2023 10.2     Eosinophil % 12/01/2023 0.9     Basophil % 12/01/2023 1.1     Neutrophils, Absolute 12/01/2023 1.30 (L)     Lymphocytes, Absolute 12/01/2023 0.40 (L)     Monocytes, Absolute 12/01/2023 0.20     Eosinophils, Absolute 12/01/2023 0.00     Basophils, Absolute 12/01/2023 0.00     nRBC 12/01/2023 0.1     Extra Tube 12/01/2023 Hold for add-ons.     Glucose 12/01/2023 111 (H)     WBC 12/02/2023 1.80 (C)     RBC 12/02/2023 3.87     Hemoglobin 12/02/2023 10.9 (L)     Hematocrit 12/02/2023 34.0     MCV 12/02/2023 87.9     MCH 12/02/2023 28.1     MCHC 12/02/2023 32.0     RDW 12/02/2023 14.2     RDW-SD 12/02/2023 46.8     MPV 12/02/2023 8.7     Platelets  12/02/2023 84 (L)     Glucose 12/02/2023 85     BUN 12/02/2023 13     Creatinine 12/02/2023 0.77     Sodium 12/02/2023 142     Potassium 12/02/2023 3.6     Chloride 12/02/2023 106     CO2 12/02/2023 27.0     Calcium 12/02/2023 9.1     Total Protein 12/02/2023 6.4     Albumin 12/02/2023 3.4 (L)     ALT (SGPT) 12/02/2023 13     AST (SGOT) 12/02/2023 29     Alkaline Phosphatase 12/02/2023 67     Total Bilirubin 12/02/2023 0.3     Globulin 12/02/2023 3.0     A/G Ratio 12/02/2023 1.1     BUN/Creatinine Ratio 12/02/2023 16.9     Anion Gap 12/02/2023 9.0     eGFR 12/02/2023 87.9     Scan Slide 12/02/2023      Neutrophil % 12/02/2023 59.0     Lymphocyte % 12/02/2023 30.0     Monocyte % 12/02/2023 8.0     Myelocyte % 12/02/2023 3.0 (H)     Neutrophils Absolute 12/02/2023 1.06 (L)     Lymphocytes Absolute 12/02/2023 0.54 (L)     Monocytes Absolute 12/02/2023 0.14     RBC Morphology 12/02/2023 Normal     WBC Morphology 12/02/2023 Normal     Platelet Estimate 12/02/2023 Decreased     Glucose 12/02/2023 81     Glucose 12/02/2023 87     Glucose 12/02/2023 103    Lab on 11/20/2023   Component Date Value    WBC 11/20/2023 1.89 (L)     RBC 11/20/2023 3.88     Hemoglobin 11/20/2023 11.2 (L)     Hematocrit 11/20/2023 35.5     MCV 11/20/2023 91.5     MCH 11/20/2023 28.9     MCHC 11/20/2023 31.5     RDW 11/20/2023 13.3     RDW-SD 11/20/2023 43.0     MPV 11/20/2023 10.7     Platelets 11/20/2023 41 (C)     Neutrophil % 11/20/2023 68.2     Lymphocyte % 11/20/2023 24.9     Monocyte % 11/20/2023 6.9     Eosinophil % 11/20/2023 0.0 (L)     Basophil % 11/20/2023 0.0     Neutrophils, Absolute 11/20/2023 1.29 (L)     Lymphocytes, Absolute 11/20/2023 0.47 (L)     Monocytes, Absolute 11/20/2023 0.13     Eosinophils, Absolute 11/20/2023 0.00     Basophils, Absolute 11/20/2023 0.00     Extra Tube 11/20/2023 Hold for add-ons.     Extra Tube 11/20/2023 Hold for add-ons.     Extra Tube 11/20/2023 Hold for add-ons.     Glucose 11/20/2023 95     BUN  11/20/2023 14     Creatinine 11/20/2023 0.94     Sodium 11/20/2023 142     Potassium 11/20/2023 4.3     Chloride 11/20/2023 111 (H)     CO2 11/20/2023 23.0     Calcium 11/20/2023 9.5     Total Protein 11/20/2023 6.7     Albumin 11/20/2023 3.7     ALT (SGPT) 11/20/2023 12     AST (SGOT) 11/20/2023 22     Alkaline Phosphatase 11/20/2023 83     Total Bilirubin 11/20/2023 0.3     Globulin 11/20/2023 3.0     A/G Ratio 11/20/2023 1.2     BUN/Creatinine Ratio 11/20/2023 14.9     Anion Gap 11/20/2023 8.0     eGFR 11/20/2023 69.2    There may be more visits with results that are not included.     Recent labs reviewed and interpreted for clinical significance and application            Level of Care:           Stanley Lopez MD  04/23/24  .

## 2024-05-09 ENCOUNTER — OFFICE VISIT (OUTPATIENT)
Dept: ONCOLOGY | Facility: CLINIC | Age: 64
End: 2024-05-09
Payer: MEDICARE

## 2024-05-09 ENCOUNTER — LAB (OUTPATIENT)
Dept: LAB | Facility: HOSPITAL | Age: 64
End: 2024-05-09
Payer: MEDICARE

## 2024-05-09 VITALS
SYSTOLIC BLOOD PRESSURE: 158 MMHG | HEIGHT: 60 IN | OXYGEN SATURATION: 96 % | TEMPERATURE: 98 F | BODY MASS INDEX: 25.32 KG/M2 | WEIGHT: 129 LBS | HEART RATE: 84 BPM | DIASTOLIC BLOOD PRESSURE: 85 MMHG | RESPIRATION RATE: 18 BRPM

## 2024-05-09 DIAGNOSIS — C79.51 MALIGNANT NEOPLASM METASTATIC TO BONE: ICD-10-CM

## 2024-05-09 DIAGNOSIS — C50.919 MALIGNANT NEOPLASM OF FEMALE BREAST, UNSPECIFIED ESTROGEN RECEPTOR STATUS, UNSPECIFIED LATERALITY, UNSPECIFIED SITE OF BREAST: Primary | ICD-10-CM

## 2024-05-09 LAB
ALBUMIN SERPL-MCNC: 4.2 G/DL (ref 3.5–5.2)
ALBUMIN/GLOB SERPL: 2 G/DL
ALP SERPL-CCNC: 62 U/L (ref 39–117)
ALT SERPL W P-5'-P-CCNC: 11 U/L (ref 1–33)
ANION GAP SERPL CALCULATED.3IONS-SCNC: 10 MMOL/L (ref 5–15)
AST SERPL-CCNC: 20 U/L (ref 1–32)
BASOPHILS # BLD AUTO: 0 10*3/MM3 (ref 0–0.2)
BASOPHILS NFR BLD AUTO: 0 % (ref 0–1.5)
BILIRUB SERPL-MCNC: 0.4 MG/DL (ref 0–1.2)
BUN SERPL-MCNC: 13 MG/DL (ref 8–23)
BUN/CREAT SERPL: 14 (ref 7–25)
CALCIUM SPEC-SCNC: 9 MG/DL (ref 8.6–10.5)
CHLORIDE SERPL-SCNC: 108 MMOL/L (ref 98–107)
CO2 SERPL-SCNC: 25 MMOL/L (ref 22–29)
CREAT SERPL-MCNC: 0.93 MG/DL (ref 0.57–1)
DEPRECATED RDW RBC AUTO: 45.4 FL (ref 37–54)
EGFRCR SERPLBLD CKD-EPI 2021: 70.1 ML/MIN/1.73
EOSINOPHIL # BLD AUTO: 0.01 10*3/MM3 (ref 0–0.4)
EOSINOPHIL NFR BLD AUTO: 0.6 % (ref 0.3–6.2)
ERYTHROCYTE [DISTWIDTH] IN BLOOD BY AUTOMATED COUNT: 13.5 % (ref 12.3–15.4)
GLOBULIN UR ELPH-MCNC: 2.1 GM/DL
GLUCOSE SERPL-MCNC: 78 MG/DL (ref 65–99)
HCT VFR BLD AUTO: 32 % (ref 34–46.6)
HGB BLD-MCNC: 10.4 G/DL (ref 12–15.9)
HOLD SPECIMEN: NORMAL
HOLD SPECIMEN: NORMAL
LYMPHOCYTES # BLD AUTO: 0.35 10*3/MM3 (ref 0.7–3.1)
LYMPHOCYTES NFR BLD AUTO: 22.2 % (ref 19.6–45.3)
MCH RBC QN AUTO: 31.6 PG (ref 26.6–33)
MCHC RBC AUTO-ENTMCNC: 32.5 G/DL (ref 31.5–35.7)
MCV RBC AUTO: 97.3 FL (ref 79–97)
MONOCYTES # BLD AUTO: 0.1 10*3/MM3 (ref 0.1–0.9)
MONOCYTES NFR BLD AUTO: 6.3 % (ref 5–12)
NEUTROPHILS NFR BLD AUTO: 1.12 10*3/MM3 (ref 1.7–7)
NEUTROPHILS NFR BLD AUTO: 70.9 % (ref 42.7–76)
PLATELET # BLD AUTO: 63 10*3/MM3 (ref 140–450)
PMV BLD AUTO: 11.2 FL (ref 6–12)
POTASSIUM SERPL-SCNC: 3.6 MMOL/L (ref 3.5–5.2)
PROT SERPL-MCNC: 6.3 G/DL (ref 6–8.5)
RBC # BLD AUTO: 3.29 10*6/MM3 (ref 3.77–5.28)
SODIUM SERPL-SCNC: 143 MMOL/L (ref 136–145)
WBC NRBC COR # BLD AUTO: 1.58 10*3/MM3 (ref 3.4–10.8)

## 2024-05-09 PROCEDURE — 85025 COMPLETE CBC W/AUTO DIFF WBC: CPT

## 2024-05-09 PROCEDURE — 36415 COLL VENOUS BLD VENIPUNCTURE: CPT

## 2024-05-09 PROCEDURE — 80053 COMPREHEN METABOLIC PANEL: CPT | Performed by: INTERNAL MEDICINE

## 2024-05-10 ENCOUNTER — APPOINTMENT (OUTPATIENT)
Dept: CT IMAGING | Facility: HOSPITAL | Age: 64
End: 2024-05-10
Payer: MEDICARE

## 2024-05-10 ENCOUNTER — HOSPITAL ENCOUNTER (EMERGENCY)
Facility: HOSPITAL | Age: 64
Discharge: HOME | End: 2024-05-10
Payer: MEDICARE

## 2024-05-10 ENCOUNTER — SPECIALTY PHARMACY (OUTPATIENT)
Dept: PHARMACY | Facility: HOSPITAL | Age: 64
End: 2024-05-10
Payer: MEDICARE

## 2024-05-10 VITALS
TEMPERATURE: 98.1 F | BODY MASS INDEX: 23.03 KG/M2 | HEIGHT: 61 IN | DIASTOLIC BLOOD PRESSURE: 78 MMHG | OXYGEN SATURATION: 100 % | SYSTOLIC BLOOD PRESSURE: 169 MMHG | RESPIRATION RATE: 18 BRPM | WEIGHT: 122 LBS | HEART RATE: 64 BPM

## 2024-05-10 DIAGNOSIS — R10.84 GENERALIZED ABDOMINAL PAIN: Primary | ICD-10-CM

## 2024-05-10 DIAGNOSIS — R19.7 DIARRHEA, UNSPECIFIED TYPE: ICD-10-CM

## 2024-05-10 LAB
ALBUMIN SERPL-MCNC: 3.9 G/DL (ref 3.5–5.2)
ALBUMIN/GLOB SERPL: 1.5 G/DL
ALP SERPL-CCNC: 62 U/L (ref 39–117)
ALT SERPL W P-5'-P-CCNC: 12 U/L (ref 1–33)
ANION GAP SERPL CALCULATED.3IONS-SCNC: 11 MMOL/L (ref 5–15)
AST SERPL-CCNC: 23 U/L (ref 1–32)
BASOPHILS # BLD AUTO: 0.02 10*3/MM3 (ref 0–0.2)
BASOPHILS NFR BLD AUTO: 1 % (ref 0–1.5)
BILIRUB SERPL-MCNC: 0.5 MG/DL (ref 0–1.2)
BILIRUB UR QL STRIP: NEGATIVE
BUN SERPL-MCNC: 9 MG/DL (ref 8–23)
BUN/CREAT SERPL: 11.4 (ref 7–25)
CALCIUM SPEC-SCNC: 9.1 MG/DL (ref 8.6–10.5)
CHLORIDE SERPL-SCNC: 109 MMOL/L (ref 98–107)
CLARITY UR: CLEAR
CO2 SERPL-SCNC: 21 MMOL/L (ref 22–29)
COLOR UR: YELLOW
CREAT SERPL-MCNC: 0.79 MG/DL (ref 0.57–1)
DEPRECATED RDW RBC AUTO: 47.7 FL (ref 37–54)
EGFRCR SERPLBLD CKD-EPI 2021: 85.2 ML/MIN/1.73
EOSINOPHIL # BLD AUTO: 0.02 10*3/MM3 (ref 0–0.4)
EOSINOPHIL NFR BLD AUTO: 1 % (ref 0.3–6.2)
ERYTHROCYTE [DISTWIDTH] IN BLOOD BY AUTOMATED COUNT: 13.6 % (ref 12.3–15.4)
GLOBULIN UR ELPH-MCNC: 2.6 GM/DL
GLUCOSE SERPL-MCNC: 74 MG/DL (ref 65–99)
GLUCOSE UR STRIP-MCNC: NEGATIVE MG/DL
HCT VFR BLD AUTO: 31.7 % (ref 34–46.6)
HGB BLD-MCNC: 10.2 G/DL (ref 12–15.9)
HGB UR QL STRIP.AUTO: NEGATIVE
IMM GRANULOCYTES # BLD AUTO: 0.08 10*3/MM3 (ref 0–0.05)
IMM GRANULOCYTES NFR BLD AUTO: 4.1 % (ref 0–0.5)
KETONES UR QL STRIP: NEGATIVE
LEUKOCYTE ESTERASE UR QL STRIP.AUTO: NEGATIVE
LIPASE SERPL-CCNC: 23 U/L (ref 13–60)
LYMPHOCYTES # BLD AUTO: 0.48 10*3/MM3 (ref 0.7–3.1)
LYMPHOCYTES NFR BLD AUTO: 24.7 % (ref 19.6–45.3)
MCH RBC QN AUTO: 30.6 PG (ref 26.6–33)
MCHC RBC AUTO-ENTMCNC: 32.2 G/DL (ref 31.5–35.7)
MCV RBC AUTO: 95.2 FL (ref 79–97)
MONOCYTES # BLD AUTO: 0.11 10*3/MM3 (ref 0.1–0.9)
MONOCYTES NFR BLD AUTO: 5.7 % (ref 5–12)
NEUTROPHILS NFR BLD AUTO: 1.23 10*3/MM3 (ref 1.7–7)
NEUTROPHILS NFR BLD AUTO: 63.5 % (ref 42.7–76)
NITRITE UR QL STRIP: NEGATIVE
NRBC BLD AUTO-RTO: 0 /100 WBC (ref 0–0.2)
PH UR STRIP.AUTO: 6 [PH] (ref 5–8)
PLATELET # BLD AUTO: 74 10*3/MM3 (ref 140–450)
PMV BLD AUTO: 10.6 FL (ref 6–12)
POTASSIUM SERPL-SCNC: 3.5 MMOL/L (ref 3.5–5.2)
PROT SERPL-MCNC: 6.5 G/DL (ref 6–8.5)
PROT UR QL STRIP: NEGATIVE
RBC # BLD AUTO: 3.33 10*6/MM3 (ref 3.77–5.28)
SODIUM SERPL-SCNC: 141 MMOL/L (ref 136–145)
SP GR UR STRIP: 1.01 (ref 1–1.03)
UROBILINOGEN UR QL STRIP: NORMAL
WBC NRBC COR # BLD AUTO: 1.94 10*3/MM3 (ref 3.4–10.8)

## 2024-05-10 PROCEDURE — 85025 COMPLETE CBC W/AUTO DIFF WBC: CPT | Performed by: PHYSICIAN ASSISTANT

## 2024-05-10 PROCEDURE — 81003 URINALYSIS AUTO W/O SCOPE: CPT | Performed by: PHYSICIAN ASSISTANT

## 2024-05-10 PROCEDURE — 83690 ASSAY OF LIPASE: CPT | Performed by: PHYSICIAN ASSISTANT

## 2024-05-10 PROCEDURE — 74176 CT ABD & PELVIS W/O CONTRAST: CPT

## 2024-05-10 PROCEDURE — 36415 COLL VENOUS BLD VENIPUNCTURE: CPT

## 2024-05-10 PROCEDURE — 99284 EMERGENCY DEPT VISIT MOD MDM: CPT

## 2024-05-10 PROCEDURE — 80053 COMPREHEN METABOLIC PANEL: CPT | Performed by: PHYSICIAN ASSISTANT

## 2024-05-10 RX ORDER — SODIUM CHLORIDE 0.9 % (FLUSH) 0.9 %
10 SYRINGE (ML) INJECTION AS NEEDED
Status: DISCONTINUED | OUTPATIENT
Start: 2024-05-10 | End: 2024-05-10 | Stop reason: HOSPADM

## 2024-05-10 NOTE — Clinical Note
[facility] EMERGENCY DEPARTMENT  [address] [place] [address]  Phone: [phone]    Gladys Garrison was seen and treated in our emergency department on 5/10/2024.  She may return to work on 05/13/2024.         Thank you for choosing [facility].    Yomaira Urena, CIRO

## 2024-05-10 NOTE — ED PROVIDER NOTES
Subjective   History of Present Illness  Chief Complaint: Diarrhea    Patient is a 63-year-old female with history of breast cancer with bone metastases presents to the ER with complaints of abdominal cramping diarrhea for 4 days.  Patient states that she went on vacation to [place], states that she ran out of her cancer medication for 1 day.  She states that when she returned she developed cramping and persistent diarrhea.  She states that she is having watery diarrhea no hematochezia or melena.  She has nausea but no vomiting.  No chest pain shortness breath headache or fever or chills.  No recent sick contacts.    PCP: Chirag Robles    History provided by:  Patient      Review of Systems   Constitutional:  Positive for fatigue. Negative for fever.   HENT:  Negative for sore throat and trouble swallowing.    Eyes: Negative.    Respiratory:  Negative for shortness of breath and wheezing.    Cardiovascular:  Negative for chest pain.   Gastrointestinal:  Positive for abdominal pain, diarrhea and nausea. Negative for vomiting.   Endocrine: Negative.    Genitourinary:  Negative for dysuria.   Musculoskeletal:  Negative for myalgias.   Skin:  Negative for rash.   Allergic/Immunologic: Negative.    Neurological:  Negative for headaches.   Psychiatric/Behavioral:  Negative for behavioral problems.    All other systems reviewed and are negative.      Past Medical History:   Diagnosis Date    Allergic     Bone cancer     METASTATIC BONE; stage 4    Bradycardia     SECONDARY TO ABLATION    Breast cancer 2017    mets to lymph nodes; did not do radiation    Cancer of unknown origin     Compression fx, thoracic spine, open, initial encounter     Coronary artery disease     COVID-19 09/01/2021    Diabetes mellitus     Heart disease, unspecified     Hepatitis C     RESOLVED WITH MEDICATION    Hyperlipidemia     Hypertension     Obesity (BMI 30-39.9) 02/05/2021    Rib fracture     Per patient    Sleep apnea     no machine     Tachycardia     ATRIAL    Type 2 diabetes mellitus 2017       Allergies   Allergen Reactions    Oxycodone Nausea And Vomiting    Promethazine Other (See Comments)     Hyperactive mean    Tape Other (See Comments)     .blisters         Past Surgical History:   Procedure Laterality Date    BACK SURGERY      neck X 2    BREAST RECONSTRUCTION Bilateral     CARDIAC ABLATION       atrial tachycardia x 5 ablations     CARDIAC CATHETERIZATION      CARDIAC ELECTROPHYSIOLOGY PROCEDURE N/A 2023    Procedure: Pacemaker RV lead insertion with generator change out. [employer];  Surgeon: Steven Hammond MD;  Location: [facility] CATH INVASIVE LOCATION;  Service: Cardiovascular;  Laterality: N/A;    CARDIAC SURGERY      stent placed in aorta    CARDIAC SURGERY      6 surgeries as baby     CERVICAL FUSION ANTERIOR WITH ARTIFICIAL DISCECTOMY IMPLANTATION N/A 2020    Procedure: C4 VERTEBRECTOMY AND ANTERIOR CERIVCAL DISCECTOMY WITH FUSION OF CERVICAL THREE THROUGH FIVE WITH REMOVAL OF HARDWARE C5-C6;  Surgeon: Mark Kaba MD;  Location: [facility] MAIN OR;  Service: Neurosurgery;  Laterality: N/A;     SECTION      x2    COLONOSCOPY N/A 10/23/2020    Procedure: COLONOSCOPY WITH POLYPECTOMY X6;  Surgeon: Stanley Bourne MD;  Location: [facility] ENDOSCOPY;  Service: Gastroenterology;  Laterality: N/A;  POLYPS, INTERNAL HEMORRHOIDS    ENDOMETRIAL ABLATION      REMOVAL SCAR TISSUE UTERINE    ENDOSCOPY N/A 10/23/2020    Procedure: ESOPHAGOGASTRODUODENOSCOPY WITH BIOPSY X 1 AREA;  Surgeon: Stanley Bourne MD;  Location: [facility] ENDOSCOPY;  Service: Gastroenterology;  Laterality: N/A;  GASTRITIS, ESOPHAGITIS, HIATAL HERNIA    INSERT / REPLACE / REMOVE PACEMAKER      KNEE SURGERY      MASTECTOMY Bilateral     NECK SURGERY      PACEMAKER IMPLANTATION      PAIN PUMP INSERTION/REVISION N/A 2022    Procedure: PAIN PUMP INSERTION AND INTRATHECAL CATHETER PLACEMENT;  Surgeon: Chuck Hunter,  MD;  Location: [facility] MAIN OR;  Service: Pain Management;  Laterality: N/A;       Family History   Problem Relation Age of Onset    Heart disease Mother     Stroke Mother     Lung cancer Mother     Aneurysm Father     Diabetes Sister     No Known Problems Brother     No Known Problems Brother     Diabetes type I Half-Sister     Thyroid cancer Half-Sister     Cancer Maternal Aunt     Heart attack Sister     Thyroid disease Sister     No Known Problems Sister        Social History     Socioeconomic History    Marital status: Legally     Number of children: 2   Tobacco Use    Smoking status: Never     Passive exposure: Never    Smokeless tobacco: Never   Vaping Use    Vaping status: Never Used   Substance and Sexual Activity    Alcohol use: Not Currently     Comment: drinks rarely    Drug use: Never    Sexual activity: Defer           Objective   Physical Exam  Vitals and nursing note reviewed.   Constitutional:       Appearance: Normal appearance. She is well-developed and normal weight. She is not ill-appearing or toxic-appearing.   HENT:      Head: Normocephalic and atraumatic.   Eyes:      Pupils: Pupils are equal, round, and reactive to light.   Cardiovascular:      Rate and Rhythm: Normal rate and regular rhythm.      Pulses: Normal pulses.      Heart sounds: Normal heart sounds. No murmur heard.  Pulmonary:      Effort: Pulmonary effort is normal. No respiratory distress.      Breath sounds: Normal breath sounds. No wheezing.   Abdominal:      General: Bowel sounds are normal. There is no distension.      Palpations: Abdomen is soft.      Tenderness: There is abdominal tenderness. There is no right CVA tenderness or left CVA tenderness.   Skin:     General: Skin is warm and dry.      Capillary Refill: Capillary refill takes less than 2 seconds.      Findings: No rash.   Neurological:      General: No focal deficit present.      Mental Status: She is alert and oriented to person, place, and time.       "Motor: No weakness.   Psychiatric:         Mood and Affect: Mood normal.         Behavior: Behavior normal.         Procedures           ED Course  ED Course as of 05/11/24 1642   Fri May 10, 2024   1444 Attempted to evaluate the patient, she is ambulatory to the bathroom, give her urine sample cup to provide sample. []   1628 Difficulty obtaining IV access, PICC team called []   1638 Patient care transferred to Yomaira Urena NP pending lab work and imaging results []   1707 WBC(!!): 1.94  1.64, 3 months ago []   1721 Spoke with CT tech, she states that patient is currently in their department and is refusing to have IV contrast because her IV is hurting her.  CT to be changed to without contrast []   2019 CT of the abdomen and pelvis results are negative for acute but shows bilateral small pleural effusions; patient is not hypoxic and sats have been 98 to 100% without supplemental oxygen. [CT]      ED Course User Index  [CT] Yomaira Urena, CIRO  [] Dedra Box PA    /78   Pulse 64   Temp 98.1 °F (36.7 °C)   Resp 18   Ht 154.9 cm (61\")   Wt 55.3 kg (122 lb)   LMP  (LMP Unknown)   SpO2 100%   BMI 23.05 kg/m²   Labs Reviewed   COMPREHENSIVE METABOLIC PANEL - Abnormal; Notable for the following components:       Result Value    Chloride 109 (*)     CO2 21.0 (*)     All other components within normal limits    Narrative:     GFR Normal >60  Chronic Kidney Disease <60  Kidney Failure <15     CBC WITH AUTO DIFFERENTIAL - Abnormal; Notable for the following components:    WBC 1.94 (*)     RBC 3.33 (*)     Hemoglobin 10.2 (*)     Hematocrit 31.7 (*)     Platelets 74 (*)     Immature Grans % 4.1 (*)     Neutrophils, Absolute 1.23 (*)     Lymphocytes, Absolute 0.48 (*)     Immature Grans, Absolute 0.08 (*)     All other components within normal limits   LIPASE - Normal   URINALYSIS W/ MICROSCOPIC IF INDICATED (NO CULTURE) - Normal    Narrative:     Urine microscopic not indicated. "   GASTROINTESTINAL PANEL, PCR (PREFERRED) DOES NOT INCLUDE CDIFF   CBC AND DIFFERENTIAL    Narrative:     The following orders were created for panel order CBC & Differential.  Procedure                               Abnormality         Status                     ---------                               -----------         ------                     CBC Auto Differential[[id number]]        Abnormal            Final result               Scan Slide[[id number]]                                                                    Please view results for these tests on the individual orders.     Medications - No data to display    CT Abdomen Pelvis Without Contrast    Result Date: 5/10/2024  Impression: 1.No acute abdominopelvic process is identified. 2.Small bilateral pleural effusions. 3.Other relatively stable chronic findings as detailed above. Electronically Signed: Kashif Villarreal MD  5/10/2024 5:41 PM EDT  Workstation ID: [id number]                                            Medical Decision Making  While in the ED IV was placed and labs were obtained appropriate PPE was worn during exam and throughout all encounters with the patient.  Patient is afebrile nontoxic in appearance and in no acute distress.  Patient care delayed due to difficulty obtaining IV access.  PICC team was consulted.  Urinalysis negative for UTI, blood work otherwise pending.  Differentials include gastroenteritis diverticulitis, obstruction, colitis but not all inclusive.    Patient care transferred to Yomaira Urena NP pending lab work and imaging results.    Problems Addressed:  Diarrhea, unspecified type: complicated acute illness or injury  Generalized abdominal pain: complicated acute illness or injury    Amount and/or Complexity of Data Reviewed  Labs: ordered. Decision-making details documented in ED Course.  Radiology: ordered. Decision-making details documented in ED Course.    Risk  Prescription drug management.        Final diagnoses:    Generalized abdominal pain   Diarrhea, unspecified type       ED Disposition  ED Disposition       ED Disposition   Discharge    Condition   Stable    Comment   --               Chirag Robles,   [address]  [place] [id number] [phone]    Schedule an appointment as soon as possible for a visit in 2 days  As needed, If symptoms worsen         Medication List      No changes were made to your prescriptions during this visit.            Dedra Box PA  05/11/24 1976

## 2024-05-10 NOTE — ED NOTES
RN attempt x1 to obtain IV access unsuccessfully, ALIREZA lockett currently in room with ultrasound to attempt. Pt reports PICC team says she either needs PICC line or chest port.

## 2024-05-10 NOTE — ED NOTES
Unable to obtain access through ultrasound IV, PICC team called, says they will be here as soon as they can.

## 2024-05-11 NOTE — DISCHARGE INSTRUCTIONS
Rest and make sure you are drinking plenty of fluids.  See attached information about food choices for diarrhea and irritable bowel.

## 2024-05-15 PROCEDURE — 87086 URINE CULTURE/COLONY COUNT: CPT | Performed by: NURSE PRACTITIONER

## 2024-05-22 ENCOUNTER — SPECIALTY PHARMACY (OUTPATIENT)
Dept: PHARMACY | Facility: HOSPITAL | Age: 64
End: 2024-05-22
Payer: MEDICARE

## 2024-05-22 RX ORDER — ABEMACICLIB 150 MG/1
150 TABLET ORAL 2 TIMES DAILY
COMMUNITY

## 2024-05-22 NOTE — PROGRESS NOTES
Specialty Pharmacy Patient Management Program  Oncology 6-Month Clinical Assessment       Gladys Garrison is a 63 y.o. female with Metastatic breast cancer, ER/HI pos, HER 2 neg seen today to assess adherence and side effects.    Reason for Outreach: Routine medication check-in .    Regimen: Verzenio (abemacilib) 150 mg by mouth two times daily + exemestane    Specialty Pharmacy Goal   Goals Addressed Today        Specialty Pharmacy General Goal      Clinical goal/therapeutic target: disease control, per the recent oncology clinic notes and labs.             Problem List   Problem list reviewed by Verona Cowart RPH on 5/22/2024 at  4:39 PM  Patient Active Problem List   Diagnosis Code    Hyperlipidemia E78.5    Hypertensive disorder I10    Osteoarthritis of multiple joints M15.9    Pulmonary hypertension I27.20    Pulmonary valve disorder I37.9    Malignant tumor of breast C50.919    Tetralogy of Fallot Q21.3    Tricuspid valve regurgitation I07.1    Controlled type 2 diabetes mellitus without complication, with long-term current use of insulin E11.9, Z79.4    Vitamin D deficiency E55.9    Acquired spondylolisthesis of cervical vertebra M43.12    Adjacent segment disease with spinal stenosis [id number]    Cervical spondylosis with myelopathy M47.12    Cervical myelopathy with cervical radiculopathy G95.9, M54.12    Osteoporosis M81.0    S/P cervical spinal fusion Z98.1    Lesion of lumbar spine M89.9    Atherosclerosis of coronary artery of native heart without angina pectoris I25.10    Rib pain on right side R07.81    Memory loss of unknown cause R41.3    Malignant neoplasm of female breast C50.919    Malignant neoplasm metastatic to bone C79.51    Supraventricular tachycardia I47.10    Thrombocytopenia D69.6    Systolic congestive heart failure I50.20    COVID-19 U07.1    Elevated AST (SGOT) R74.01    Hyponatremia E87.1    Pacemaker Z95.0    Sick sinus syndrome I49.5    AV block, complete I44.2    Cancer associated  pain G89.3    Dyspnea R06.00    Pain in left knee M25.562    Abnormal radiographic examination R93.89    Acute sinusitis J01.90    Allergic rhinitis J30.9    Constipation K59.00    Diarrhea R19.7    Nausea R11.0    Disorder of aorta I77.9    Dizziness and giddiness R42    Headache R51.9    Lack of energy R53.83    Syncope R55    Pacemaker complications T82.9XXA    Fall against object W18.00XA    Complete heart block I44.2       Medication Assessment for Specialty Medication(s):  Medication Assessment  Follow Up Clinical Assessment  Linked Medication(s) Assessed: Abemaciclib  Therapeutic appropriateness: Appropriate  Medication tolerability: Patient reported common adverse drug reaction  Common ADR(s) experienced: Pt reports diarrhea which she does use imodium but states it does not help much. She states diarrhea come and goes and it is manageable at this time. She stays well hydrated. Encouraged pt to let us know if she ever has 4 or more episodes of diarrhea in a day.  Medication plan: Continue therapy with normal follow-up  Quality of Life Improvement Scale: 7-Somewhat better  Comments on Quality of Life: Med does cause diarrhea which is bothersome, but manageable at this time.Otherwise she reports she tolerates medication okay and continues to complete ADL  Administration: Patient is taking every day at the same time , twice daily, and as prescribed .   Patient can self administer medications: Yes  Medication Follow-Up Plan: Next clinical assessment  Lab Review: The labs listed below have been reviewed. No dose adjustments are needed for the oral specialty medication(s) based on the labs.    Lab Results   Component Value Date    GLUCOSE 74 05/10/2024    CALCIUM 9.1 05/10/2024     05/10/2024    K 3.5 05/10/2024    CO2 21.0 (L) 05/10/2024     (H) 05/10/2024    BUN 9 05/10/2024    CREATININE 0.79 05/10/2024    EGFRIFAFRI >60 10/12/2018    EGFRIFNONA 70 12/20/2021    BCR 11.4 05/10/2024    ANIONGAP 11.0  05/10/2024     Lab Results   Component Value Date    WBC 1.94 (C) 05/10/2024    RBC 3.33 (L) 05/10/2024    HGB 10.2 (L) 05/10/2024    HCT 31.7 (L) 05/10/2024    MCV 95.2 05/10/2024    MCH 30.6 05/10/2024    MCHC 32.2 05/10/2024    RDW 13.6 05/10/2024    RDWSD 47.7 05/10/2024    MPV 10.6 05/10/2024    PLT 74 (L) 05/10/2024    NEUTRORELPCT 63.5 05/10/2024    LYMPHORELPCT 24.7 05/10/2024    MONORELPCT 5.7 05/10/2024    EOSRELPCT 1.0 05/10/2024    BASORELPCT 1.0 05/10/2024    AUTOIGPER 4.1 (H) 05/10/2024    NEUTROABS 1.23 (L) 05/10/2024    LYMPHSABS 0.48 (L) 05/10/2024    MONOSABS 0.11 05/10/2024    EOSABS 0.02 05/10/2024    BASOSABS 0.02 05/10/2024    AUTOIGNUM 0.08 (H) 05/10/2024    NRBC 0.0 05/10/2024     Drug-drug interactions  Completed medication reconciliation today to assess for drug interactions. Patient denies starting or stopping any medications.    Assessed medication list for interactions, no significant drug interactions noted.   Advised patient to call the clinic if any new medications are started so we can assess for drug-drug interactions.  Drug-food interactions discussed: eating grapefruit and drinking grapefruit juice  Vaccines are coordinated by the patient's oncologist and primary care provider.    Medications   Medicines reviewed by Verona Cowart RPH on 5/22/2024 at  4:37 PM  Prior to Admission medications    Medication Sig Start Date End Date Taking? Authorizing Provider   Abemaciclib (Verzenio) 150 MG tablet Take 1 tablet by mouth 2 (Two) Times a Day.    Provider, MD Magdalene   Blood Glucose Monitoring Suppl (Accu-Chek Araceli) device Use as instructed   To test blood sugar bid 10/25/21   Angelica Rodriguez MD   Calcium Carbonate-Vit D-Min (Calcium 600+D Plus Minerals) 600-400 MG-UNIT chewable tablet Chew 600 mg 2 (Two) Times a Day. 2/12/21   Мария Nunez MD   cefdinir (OMNICEF) 300 MG capsule Take 1 capsule by mouth 2 (Two) Times a Day for 7 days. 5/15/24 5/22/24  Rukhsana Holden  "CIRO IRBY   Continuous Blood Gluc  (FreeStyle Rajeev 14 Day [place]) device 1 each Daily. 6/19/23   Chirag Robles DO   Continuous Blood Gluc Sensor (FreeStyle Rajeev 14 Day Sensor) misc 1 each Daily. 6/19/23   Chirag Robles DO   cyclobenzaprine (FLEXERIL) 10 MG tablet Take 1 tablet by mouth 2 (Two) Times a Day As Needed for Muscle Spasms. 1/2/24   Chirag Robles DO   exemestane (AROMASIN) 25 MG tablet Take 1 tablet by mouth Daily.    ProviderMagdalene MD   famotidine (PEPCID) 20 MG tablet Take 1 tablet by mouth 2 (Two) Times a Day As Needed for Heartburn.    ProviderMagdalene MD   ibuprofen (ADVIL,MOTRIN) 800 MG tablet Take 1 tablet by mouth Every 6 (Six) Hours As Needed for Mild Pain. 4/12/24   Chuck Hunter MD   insulin NPH-insulin regular (humuLIN 70/30,novoLIN 70/30) (70-30) 100 UNIT/ML injection Inject 5 Units under the skin into the appropriate area as directed Every Evening. If BS over 180    Magdalene Russell MD   Insulin Pen Needle (B-D UF III MINI PEN NEEDLES) 31G X 5 MM misc Use to inject insulin twice daily   DX: E11.9 8/22/22   Chirag Robles DO   Insulin Syringe-Needle U-100 28G X 1/2\" 1 ML misc 1 each 2 (Two) Times a Day. 11/18/22   Chirag Robles DO   losartan (Cozaar) 50 MG tablet Take 1 tablet by mouth Daily. Discontinue the lisinopril due to interaction with new chemotherapy 9/18/23   Chirag Robles DO   Morphine Sulfate, PF, 8 MG/ML solution 8.5 mg by Intrathecal Infusion route Daily. Receives 8 mg through pain pump q 24 hrs    Magdalene Russell MD   ondansetron ODT (ZOFRAN-ODT) 4 MG disintegrating tablet Place 1 tablet on the tongue Every 8 (Eight) Hours As Needed for Nausea or Vomiting. 5/8/24   Mere Walker APRN   rosuvastatin (Crestor) 20 MG tablet Take 1 tablet by mouth Every Night. 3/9/21   Preethi Vasquez APRN       Allergies  Known allergies and reactions were discussed with the patient. The patient's " chart has been reviewed for allergy information and updated as necessary.   Allergies   Allergen Reactions    Oxycodone Nausea And Vomiting    Promethazine Other (See Comments)     Hyperactive mean    Tape Other (See Comments)     .blisters           Allergies reviewed by Verona Cowart RP on 5/22/2024 at  4:36 PM    Hospitalizations and Urgent Care Visits Since Last Assessment:  Unplanned hospitalizations or inpatient admissions: None reported  ED Visits: Yes, in March due to abdominal pain  Urgent Office Visits: Yes, due to abdominal pain and UTI    Adherence Assessment:  Adherence Questions  Linked Medication(s) Assessed: Abemaciclib  On average, how many doses/injections does the patient miss per month?: 1  What are the identified reasons for non-adherence or missed doses? : no problems identified  What is the estimated medication adherence level?: % (per pt report)  Based on the patient/caregiver response and refill history, does this patient require an MTP to track adherence improvements?: no    Quality of Life Assessment:  Quality of Life Assessment  Quality of Life Improvement Scale: 7-Somewhat better  Comments on Quality of Life: Med does cause diarrhea which is bothersome, but manageable at this time.Otherwise she reports she tolerates medication okay and continues to complete ADL  -- Quality of Life: 7/10    Financial Assessment:  Medication availability/affordability: Patient has had no issues obtaining medication from pharmacy. Pt receives medication from .    Attestation:  I attest the patient was actively involved in and has agreed to the above plan of care. If the prescribed therapy is at any point deemed not appropriate based on the current or future assessments, a consultation will be initiated with the patient's specialty care provider to determine the best course of action. The revised plan of therapy will be documented along with any required assessments and/or additional  patient education provided.       All questions addressed and patient had no additional concerns. Patient has pharmacy contact information.    Verona Cowart, Pharm.D.    5/22/2024  16:43 EDT

## 2024-05-24 ENCOUNTER — TELEPHONE (OUTPATIENT)
Dept: ONCOLOGY | Facility: CLINIC | Age: 64
End: 2024-05-24
Payer: MEDICARE

## 2024-05-24 DIAGNOSIS — Z51.5 PALLIATIVE CARE STATUS: Primary | ICD-10-CM

## 2024-05-24 RX ORDER — HYDROCODONE BITARTRATE AND ACETAMINOPHEN 10; 325 MG/1; MG/1
1 TABLET ORAL EVERY 8 HOURS PRN
Qty: 90 TABLET | Refills: 0 | Status: SHIPPED | OUTPATIENT
Start: 2024-05-24

## 2024-05-24 NOTE — TELEPHONE ENCOUNTER
Pt said she had not heard from anyone about getting her scans scheduled. [facility] has it noted that they tried 3x to reach pt and left VM's.   I called pt and gave her the scheduling contact number so she could call them back to get those scheduled. [phone].

## 2024-06-21 NOTE — PROGRESS NOTES
Hematology/Oncology Outpatient Follow Up    PATIENT NAME:Gladys Garrison  :1960  MRN: [id number]  PRIMARY CARE PHYSICIAN: Chirag Robles DO  REFERRING PHYSICIAN: Chirag Robles, *      Chief Complaint   Patient presents with    Follow-up     Malignant neoplasm of female breast, unspecified estrogen receptor status, unspecified laterality, unspecified site of breast        HISTORY OF PRESENT ILLNESS:     This is a 63-year-old female who multiple comorbidities including congenital abnormalities such as hypoplastic kidney, tetralogy of Fallot, coarctation of the aorta and congenital VSD status post repair.  Patient developed syncopal episode and for that reason she had she had a CT scan of the chest which showed mass in the left breast.  She had diagnostic mammogram and ultrasound which showed a 2 cm spiculated mass in the left breast at the 1 o'clock position 6 cm from the nipple.  Biopsy of the left breast mass revealed invasive moderately differentiated carcinoma ER/MT positive and HER-2/bishop negative.  Patient also had an ultrasound of the axilla which was concerning for an abnormal left axillary lymph node with cortical thickening. She apparently had an FNA of the left axillary nodule which was positive for malignancy.  On 2017 patient underwent left modified radical mastectomy, right prophylactic mastectomy and immediate breast reconstruction. She also underwent right prophylactic mastectomy.  We have had her on records suggest that patient did have multifocal disease with pT2 pN1 aM0.  The largest focus measured 3.5 cm.  2 of 11 lymph nodes were positive for metastatic disease some with extracapsular extension.  Notes that patient did receive Arimidex neoadjuvant  from 2017 to 2018 prior to her bilateral mastectomies.    Review of her note suggest that she developed cough which she attributed to anastrozole and patient was then placed on tamoxifen.  She had MammaPrint  testing which returned with low risk for relapse.    Her postop course was also complicated by left chest wall abscess which resulted in I&D and removal of the left tissue expander. She was referred for radiation treatment boards ultimately declined.    Patient was then placed on tamoxifen in April 2018 which she stopped after less than a month due to nausea and vomiting.  Patient in the interim also was diagnosed with chronic hepatitis C was seen by the hepatologist.  Tamoxifen was dose reduced to 10 mg daily with the goal to increase to 20 mg daily.      Patient has relocated to [facility].  She has transitioned her care to us now.  She is currently not on any antiestrogen therapy.     Her bilateral mastectomy specimen are available for review    1/29/2021 patient had bone density which showed osteoporosis  Patient was seen by neurosurgery and had a bone scan done in March 2021 which showed subtle activity in the inferior L4 vertebral body appears to correlate with new area of sclerosis on CT of the abdomen and pelvis.  There is also subtle activity with possible new lesion at the posterior left sacrum.  These findings were concerning for metastatic disease.  PET CT scan was recommended to further evaluate.  4/8/2021 patient had a PET CT scan which showed evidence of disease in the neck chest abdomen and pelvis.  But she has a 1.1 cm mixed lytic/sclerotic hypermetabolic lesion within the left hemisacrum consistent with metastatic disease.  There is also accumulation within the inferior endplate of the L4 vertebral body thought to correspond to 1.2 centimeters sclerotic lesion consistent with metastatic disease.  There is a tiny hypermetabolic focus within the L3, T11.  Suspicious for also early metastatic disease.  4/26/2021 patient underwent CT-guided biopsy of sacral lesion, pathology was consistent with metastatic breast cancer ER and IL positive HER-2/bishop was negative.  7/6/21 CT new sclerosis within the  right 12th rib posteriorly which could represent metastatic lesion or a healed or healing fracture, new sclerosis within the right 12th rib posteriorly which         could represent metastatic lesion,new sclerosis within the right T8 transverse process worrisome   for metastatic        disease progression.  7/7/21 complaints of 8/10 back pain and 8/10 LUQ pain. Referral to radiation for questionable mets on CT chest.  7/26/2021 patient had CT scan of the head with contrast with did not show any evidence of metastatic disease.  CT scan of the chest abdomen and pelvis did not show any evidence of progressive disease.  7/26/2021 patient had CEA level which shows declining values CA 15-3 has decreased to 62 from 84  11/3/2021: Patient had CT scan of the chest, abdomen and pelvis. In the chest there were no suspicious pulmonary nodules. There is no clear evidence of progressive disease  12/13/2021 patient had a bone scan which showed uptake along the posterior right ribs consistent with rib fractures.  There is mild uptake in the L4 vertebral body corresponding to the sclerotic lesion seen on previous CT scan.  This was likely due to metastatic disease  10/6/2022: Patient has been lost to follow-up.  She stopped Ibrance due to pulmonary issues.  Apparently patient went to the emergency room and was told that Ibrance was causing lung damage.  October 6, 2022: She has a increasing CA 15-3 and CA 27.29 as well as CEA level.  10/19/2022: Patient had guardant 360 testing done which was negative  10/31/2022: Patient had bone scan which basically showed evidence for mild progression of multifocal osseous metastatic disease.  There appears to be new activity corresponding to the thoracic cervical spine, left scapula, left sacrum and left proximal femur.  CT scan of the chest otherwise is stable.  There is a nonobstructing stone on the left kidney.        Past Medical History:   Diagnosis Date    Allergic     Bone cancer      METASTATIC BONE; stage 4    Bradycardia     SECONDARY TO ABLATION    Breast cancer     mets to lymph nodes; did not do radiation    Cancer of unknown origin     Compression fx, thoracic spine, open, initial encounter     Coronary artery disease     COVID-19 2021    Diabetes mellitus     Heart disease, unspecified     Hepatitis C     RESOLVED WITH MEDICATION    Hyperlipidemia     Hypertension     Obesity (BMI 30-39.9) 2021    Rib fracture     Per patient    Sleep apnea     no machine    Tachycardia     ATRIAL    Type 2 diabetes mellitus 2017       Past Surgical History:   Procedure Laterality Date    BACK SURGERY      neck X 2    BREAST RECONSTRUCTION Bilateral     CARDIAC ABLATION       atrial tachycardia x 5 ablations     CARDIAC CATHETERIZATION      CARDIAC ELECTROPHYSIOLOGY PROCEDURE N/A 2023    Procedure: Pacemaker RV lead insertion with generator change out. [employer]oretr[employer];  Surgeon: Steven Hammond MD;  Location: [facility] CATH INVASIVE LOCATION;  Service: Cardiovascular;  Laterality: N/A;    CARDIAC SURGERY      stent placed in aorta    CARDIAC SURGERY      6 surgeries as baby     CERVICAL FUSION ANTERIOR WITH ARTIFICIAL DISCECTOMY IMPLANTATION N/A 2020    Procedure: C4 VERTEBRECTOMY AND ANTERIOR CERIVCAL DISCECTOMY WITH FUSION OF CERVICAL THREE THROUGH FIVE WITH REMOVAL OF HARDWARE C5-C6;  Surgeon: Mark Kaba MD;  Location: [facility] MAIN OR;  Service: Neurosurgery;  Laterality: N/A;     SECTION      x2    COLONOSCOPY N/A 10/23/2020    Procedure: COLONOSCOPY WITH POLYPECTOMY X6;  Surgeon: Stanley Bourne MD;  Location: [facility] ENDOSCOPY;  Service: Gastroenterology;  Laterality: N/A;  POLYPS, INTERNAL HEMORRHOIDS    ENDOMETRIAL ABLATION      REMOVAL SCAR TISSUE UTERINE    ENDOSCOPY N/A 10/23/2020    Procedure: ESOPHAGOGASTRODUODENOSCOPY WITH BIOPSY X 1 AREA;  Surgeon: Stanley Bourne MD;  Location: [facility] ENDOSCOPY;  Service: Gastroenterology;   Laterality: N/A;  GASTRITIS, ESOPHAGITIS, HIATAL HERNIA    INSERT / REPLACE / REMOVE PACEMAKER      KNEE SURGERY  2000    MASTECTOMY Bilateral     NECK SURGERY      PACEMAKER IMPLANTATION      PAIN PUMP INSERTION/REVISION N/A 04/05/2022    Procedure: PAIN PUMP INSERTION AND INTRATHECAL CATHETER PLACEMENT;  Surgeon: Chuck Hunter MD;  Location: [facility] MAIN OR;  Service: Pain Management;  Laterality: N/A;         Current Outpatient Medications:     ondansetron ODT (ZOFRAN-ODT) 4 MG disintegrating tablet, Place 2 tablets on the tongue Every 8 (Eight) Hours As Needed for Nausea or Vomiting., Disp: 30 tablet, Rfl: 3    Abemaciclib (Verzenio) 150 MG tablet, Take 1 tablet by mouth 2 (Two) Times a Day., Disp: , Rfl:     Blood Glucose Monitoring Suppl (Accu-Chek Araceli) device, Use as instructed   To test blood sugar bid, Disp: 1 each, Rfl: 0    Calcium Carbonate-Vit D-Min (Calcium 600+D Plus Minerals) 600-400 MG-UNIT chewable tablet, Chew 600 mg 2 (Two) Times a Day., Disp: 60 each, Rfl: 6    Continuous Blood Gluc  (FreeStyle Rajeev 14 Day Brothers) device, 1 each Daily., Disp: 1 each, Rfl: 0    Continuous Blood Gluc Sensor (FreeStyle Rajeev 14 Day Sensor) misc, 1 each Daily., Disp: 6 each, Rfl: 3    cyclobenzaprine (FLEXERIL) 10 MG tablet, Take 1 tablet by mouth 2 (Two) Times a Day As Needed for Muscle Spasms., Disp: 30 tablet, Rfl: 1    diphenoxylate-atropine (LOMOTIL) 2.5-0.025 MG per tablet, Take 1 tablet by mouth 3 (Three) Times a Day., Disp: 30 tablet, Rfl: 1    exemestane (AROMASIN) 25 MG tablet, Take 1 tablet by mouth Daily., Disp: , Rfl:     famotidine (PEPCID) 20 MG tablet, Take 1 tablet by mouth 2 (Two) Times a Day As Needed for Heartburn., Disp: , Rfl:     HYDROcodone-acetaminophen (NORCO)  MG per tablet, Take 1 tablet by mouth Every 8 (Eight) Hours As Needed for Moderate Pain., Disp: 90 tablet, Rfl: 0    ibuprofen (ADVIL,MOTRIN) 800 MG tablet, Take 1 tablet by mouth Every 6 (Six) Hours As Needed for  "Mild Pain., Disp: 90 tablet, Rfl: 0    insulin NPH-insulin regular (humuLIN 70/30,novoLIN 70/30) (70-30) 100 UNIT/ML injection, Inject 5 Units under the skin into the appropriate area as directed Every Evening. If BS over 180, Disp: , Rfl:     Insulin Pen Needle (B-D UF III MINI PEN NEEDLES) 31G X 5 MM misc, Use to inject insulin twice daily   DX: E11.9, Disp: 100 each, Rfl: 0    Insulin Syringe-Needle U-100 28G X 1/2\" 1 ML misc, 1 each 2 (Two) Times a Day., Disp: 100 each, Rfl: 6    losartan (Cozaar) 50 MG tablet, Take 1 tablet by mouth Daily. Discontinue the lisinopril due to interaction with new chemotherapy, Disp: 90 tablet, Rfl: 1    Morphine Sulfate, PF, 8 MG/ML solution, 8.5 mg by Intrathecal Infusion route Daily. Receives 8 mg through pain pump q 24 hrs, Disp: , Rfl:     rosuvastatin (Crestor) 20 MG tablet, Take 1 tablet by mouth Every Night., Disp: 90 tablet, Rfl: 0    Allergies   Allergen Reactions    Oxycodone Nausea And Vomiting    Promethazine Other (See Comments)     Hyperactive mean    Tape Other (See Comments)     .blisters         Family History   Problem Relation Age of Onset    Heart disease Mother     Stroke Mother     Lung cancer Mother     Aneurysm Father     Diabetes Sister     No Known Problems Brother     No Known Problems Brother     Diabetes type I Half-Sister     Thyroid cancer Half-Sister     Cancer Maternal Aunt     Heart attack Sister     Thyroid disease Sister     No Known Problems Sister        Cancer-related family history includes Cancer in her maternal aunt; Lung cancer in her mother; Thyroid cancer in her half-sister.    Social History     Tobacco Use    Smoking status: Never     Passive exposure: Never    Smokeless tobacco: Never   Vaping Use    Vaping status: Never Used   Substance Use Topics    Alcohol use: Yes     Comment: drinks rarely    Drug use: Never     I have reviewed and confirmed the accuracy of the patient's history: Chief complaint, HPI, ROS, and Subjective as " "entered by the MA/LPN/RN. Мария Nunez MD 06/24/24 6/24/24      SUBJECTIVE:    Patient is here today for routine follow-up and does not have any new issues.  She still has diarrhea which could last for several days and then discontinue.  Imodium does not help when she has diarrhea.  She has not been able to have her scans done due to schedule conflicts.  I have encouraged patient to have this accomplished.      Gladys Garrison reports a pain score of 7.  Given her pain assessment as noted, treatment options were discussed and the following options were decided upon as a follow-up plan to address the patient's pain: continuation of current treatment plan for pain and referral to specialist for assistance in pain treatment guidance.       REVIEW OF SYSTEMS:     Review of Systems   Constitutional:  Negative for chills and fever.   HENT:  Negative for ear pain, mouth sores, nosebleeds and sore throat.    Eyes:  Negative for photophobia and visual disturbance.   Respiratory:  Negative for wheezing and stridor.    Cardiovascular:  Negative for chest pain and palpitations.   Gastrointestinal:  Negative for abdominal pain, diarrhea, nausea and vomiting.   Endocrine: Negative for cold intolerance and heat intolerance.   Genitourinary:  Negative for dysuria and hematuria.   Musculoskeletal:  Negative for joint swelling and neck stiffness.   Skin:  Negative for color change and rash.   Neurological:  Negative for seizures and syncope.   Hematological:  Negative for adenopathy.        No obvious bleeding   Psychiatric/Behavioral:  Negative for agitation, confusion and hallucinations.          OBJECTIVE:    Vitals:    06/24/24 1256   BP: 135/78   Pulse: 81   Resp: 18   Temp: 98 °F (36.7 °C)   TempSrc: Infrared   SpO2: 98%   Weight: 53.5 kg (118 lb)   Height: 154.9 cm (61\")   PainSc:   7   PainLoc: Generalized                 Body mass index is 22.3 kg/m².    ECOG    (1) Restricted in physically strenuous activity, " ambulatory and able to do work of light nature    Physical Exam  Vitals and nursing note reviewed.   Constitutional:       General: She is not in acute distress.     Appearance: Normal appearance. She is not diaphoretic.   HENT:      Head: Normocephalic and atraumatic.      Mouth/Throat:      Mouth: Mucous membranes are moist.      Pharynx: Oropharynx is clear.   Eyes:      General: No scleral icterus.        Right eye: No discharge.         Left eye: No discharge.      Extraocular Movements: Extraocular movements intact.      Conjunctiva/sclera: Conjunctivae normal.   Neck:      Thyroid: No thyromegaly.   Cardiovascular:      Rate and Rhythm: Normal rate and regular rhythm.      Pulses: Normal pulses.      Heart sounds: Normal heart sounds.      No friction rub. No gallop.   Pulmonary:      Effort: Pulmonary effort is normal. No respiratory distress.      Breath sounds: Normal breath sounds. No stridor. No wheezing.   Abdominal:      General: Bowel sounds are normal. There is distension.      Palpations: Abdomen is soft. There is no mass.      Tenderness: There is abdominal tenderness. There is guarding (Left upper abdomen). There is no rebound.   Musculoskeletal:         General: No tenderness. Normal range of motion.      Cervical back: Normal range of motion and neck supple.      Right lower leg: No edema.      Left lower leg: No edema.      Comments: Neck pain.  Patient has a neck collar.   Lymphadenopathy:      Cervical: No cervical adenopathy.   Skin:     General: Skin is warm and dry.      Capillary Refill: Capillary refill takes less than 2 seconds.      Findings: No bruising, erythema or rash.   Neurological:      Mental Status: She is alert and oriented to person, place, and time.      Cranial Nerves: No cranial nerve deficit.      Sensory: No sensory deficit.      Motor: No abnormal muscle tone.   Psychiatric:         Mood and Affect: Mood normal.         Behavior: Behavior normal.         Thought  Content: Thought content normal.         Judgment: Judgment normal.     I have reexamined the patient and the results are consistent with the previously documented exam.    I have reexamined the patient and the results are consistent with the previously documented exam. Мария Nunez MD       RECENT LABS    WBC   Date Value Ref Range Status   06/24/2024 2.02 (L) 3.40 - 10.80 10*3/mm3 Final     RBC   Date Value Ref Range Status   06/24/2024 3.52 (L) 3.77 - 5.28 10*6/mm3 Final     Hemoglobin   Date Value Ref Range Status   06/24/2024 11.0 (L) 12.0 - 15.9 g/dL Final     Hematocrit   Date Value Ref Range Status   06/24/2024 34.8 34.0 - 46.6 % Final     MCV   Date Value Ref Range Status   06/24/2024 98.9 (H) 79.0 - 97.0 fL Final     MCH   Date Value Ref Range Status   06/24/2024 31.3 26.6 - 33.0 pg Final     MCHC   Date Value Ref Range Status   06/24/2024 31.6 31.5 - 35.7 g/dL Final     RDW   Date Value Ref Range Status   06/24/2024 13.7 12.3 - 15.4 % Final     RDW-SD   Date Value Ref Range Status   06/24/2024 47.8 37.0 - 54.0 fl Final     MPV   Date Value Ref Range Status   06/24/2024 10.4 6.0 - 12.0 fL Final     Platelets   Date Value Ref Range Status   06/24/2024 67 (L) 140 - 450 10*3/mm3 Final     Neutrophil %   Date Value Ref Range Status   06/24/2024 66.3 42.7 - 76.0 % Final     Lymphocyte %   Date Value Ref Range Status   06/24/2024 24.3 19.6 - 45.3 % Final     Monocyte %   Date Value Ref Range Status   06/24/2024 7.4 5.0 - 12.0 % Final     Eosinophil %   Date Value Ref Range Status   06/24/2024 1.5 0.3 - 6.2 % Final     Basophil %   Date Value Ref Range Status   06/24/2024 0.5 0.0 - 1.5 % Final     Immature Grans %   Date Value Ref Range Status   05/10/2024 4.1 (H) 0.0 - 0.5 % Final     Neutrophils, Absolute   Date Value Ref Range Status   06/24/2024 1.34 (L) 1.70 - 7.00 10*3/mm3 Final     Lymphocytes, Absolute   Date Value Ref Range Status   06/24/2024 0.49 (L) 0.70 - 3.10 10*3/mm3 Final     Monocytes,  Absolute   Date Value Ref Range Status   06/24/2024 0.15 0.10 - 0.90 10*3/mm3 Final     Eosinophils, Absolute   Date Value Ref Range Status   06/24/2024 0.03 0.00 - 0.40 10*3/mm3 Final     Basophils, Absolute   Date Value Ref Range Status   06/24/2024 0.01 0.00 - 0.20 10*3/mm3 Final     Immature Grans, Absolute   Date Value Ref Range Status   05/10/2024 0.08 (H) 0.00 - 0.05 10*3/mm3 Final     nRBC   Date Value Ref Range Status   05/10/2024 0.0 0.0 - 0.2 /100 WBC Final       Lab Results   Component Value Date    GLUCOSE 74 05/10/2024    BUN 9 05/10/2024    CREATININE 0.79 05/10/2024    EGFRIFNONA 70 12/20/2021    EGFRIFAFRI >60 10/12/2018    BCR 11.4 05/10/2024    K 3.5 05/10/2024    CO2 21.0 (L) 05/10/2024    CALCIUM 9.1 05/10/2024    PROTENTOTREF 7.4 12/14/2020    ALBUMIN 3.9 05/10/2024    LABIL2 0.9 12/14/2020    AST 23 05/10/2024    ALT 12 05/10/2024       ASSESSMENT:    Metastatic breast cancer presenting as 1.1 cm mixed lytic/sclerotic hypermetabolic lesion in the left hemisacrum suspicious for metastatic disease 1.2 cm sclerotic lesion on L4, small L3 lesion and T11 lesion.  Tumor is ER positive, OR positive and HER-2/bishop negative.  Patient was prescribed combination of Ibrance and Arimidex.  Continued Ibrance due to pulmonary toxicity.  She was then placed on single agent Arimidex.  Upon progression on Arimidex was switched to Faslodex.  She developed L1 vertebral body progressive disease on Faslodex for that reason we will discontinue Faslodex and begin combination of Aromasin and Afinitor.  Patient took Afinitor for 3 days and discontinued due to nausea and fatigue.  [id number] does not show any actionable mutation.  She does not have any soft tissue for us to biopsy for additional NGS testing.  Ongoing management  She is currently on Aromasin and abemaciclib stable.  Continue Aromasin and abemaciclib.  Abemaciclib induced diarrhea: Recommend use Lomotil to be used as needed  Fatigue secondary to  abemaciclib: This is stable  Thrombocytopenia due to abemaciclib.  CBC reviewed  Bone metastasis:.  Biphosphonate's has been recommended patient has not been able to have due to ongoing dental issues  Right foot swelling: Doppler study was negative  History of medical noncompliance  Pancytopenia secondary to Ibrance: Improved off Ibrance  L1 vertebral body involvement.  Currently undergoing XRT.  Patient is also established with Dr. Ruff.  MRI of the spine has been scheduled for October 2023  Pain management  ypT2 N1 aM0 status post left modified radical mastectomy with lymph node dissection and right prophylactic mastectomy in 2017 performed at [facility].  ER positive, GA positive and HER-2/bishop negative.  Status post bilateral chest wall reconstruction.  According to patient, she tolerated Arimidex very well except that she also had osteoporosis therefore Arimidex was discontinued at that time  Intolerance of tamoxifen in the past  Osteoporosis: We will transition to Xgeva since she has bone metastases  Status post neoadjuvant Arimidex prior to bilateral mastectomies  Thrombocytopenia: Work-up was - December 2020  Neck pain status post cervical spine surgery: Resolved  Complex cardiac medical history including tetralogy of Fallot, coarctation of aorta, VSD status post repair.  Status post stent placement for coarctation of aorta  Personal history and strong family history of breast cancer in multiple in the relatives on paternal side of the family including 4 paternal aunts in their 30s and 40s and 2 maternal aunts at age of 20s and 50s.  There is concern for possible hereditary breast cancer syndrome.  Patient may be  interested in pursuing cancer genetics for her management.  Assessment has been reviewed and updated          Discussion    Patient has metastatic breast cancer estrogen and progesterone dependent and HER-2/bishop negative.  She is currently on Arimidex.  We will add Ibrance.  We  will also discontinue Prolia and begin Xgeva to help reduce skeletal events.           Plans:     Continue current treatment Aromasin and Abemaciclib  CBC reviewed  CMP ordered  Zofran ODT's 8 mg renewed  Lomotil as needed  Schedule CT scan chest abdomen pelvis and bone scan which is due now.  Reordered  Pancytopenia is due to Verzenio  [facility] pharmacist follow-up with her next week  Checked tumor markers for CEA CA 15-3 and CA 27.29 reviewed  She has completed  palliative XRT to her lumbar spine  Follow-up with Dr. Ruff with neurosurgical services  Guardant 360 for NGS testing was negative for any actionable mutations  Follow-up with pain management with Dr. Dale Kiser 360 does not show any actionable mutation.  Would consider soft tissue biopsy at time of progression for additional NGS testing.  Reviewed with patient  Referred to pulmonary for her dyspnea: Dr. Julio.  Appointment is pending  Follow-up with pain management for ongoing pain issues  Follow-up with /counselor   Reviewed work-up for thrombocytopenia which was negative  Reviewed her bone density which showed osteoporosis  Schedule Xgeva 120 mg subcu monthly once her dental procedures are completed.  She is still waiting on a ida  All questions answered  Follow-up 5 weeks  All questions answered            Patient verbalized understanding and is in agreement of the above plan.           I spent 30 total minutes, face-to-face, caring for Gladys today. 90% of this time involved counseling and/or coordination of care as documented within this note.

## 2024-06-24 ENCOUNTER — OFFICE VISIT (OUTPATIENT)
Dept: ONCOLOGY | Facility: CLINIC | Age: 64
End: 2024-06-24
Payer: MEDICARE

## 2024-06-24 ENCOUNTER — LAB (OUTPATIENT)
Dept: LAB | Facility: HOSPITAL | Age: 64
End: 2024-06-24
Payer: MEDICARE

## 2024-06-24 VITALS
HEART RATE: 81 BPM | DIASTOLIC BLOOD PRESSURE: 78 MMHG | OXYGEN SATURATION: 98 % | TEMPERATURE: 98 F | SYSTOLIC BLOOD PRESSURE: 135 MMHG | BODY MASS INDEX: 22.28 KG/M2 | RESPIRATION RATE: 18 BRPM | WEIGHT: 118 LBS | HEIGHT: 61 IN

## 2024-06-24 DIAGNOSIS — C50.919 MALIGNANT NEOPLASM OF FEMALE BREAST, UNSPECIFIED ESTROGEN RECEPTOR STATUS, UNSPECIFIED LATERALITY, UNSPECIFIED SITE OF BREAST: Primary | ICD-10-CM

## 2024-06-24 DIAGNOSIS — C79.51 MALIGNANT NEOPLASM METASTATIC TO BONE: ICD-10-CM

## 2024-06-24 DIAGNOSIS — R11.0 NAUSEA: ICD-10-CM

## 2024-06-24 LAB
ALBUMIN SERPL-MCNC: 4.5 G/DL (ref 3.5–5.2)
ALBUMIN/GLOB SERPL: 1.7 G/DL
ALP SERPL-CCNC: 78 U/L (ref 39–117)
ALT SERPL W P-5'-P-CCNC: 16 U/L (ref 1–33)
ANION GAP SERPL CALCULATED.3IONS-SCNC: 9.3 MMOL/L (ref 5–15)
AST SERPL-CCNC: 25 U/L (ref 1–32)
BASOPHILS # BLD AUTO: 0.01 10*3/MM3 (ref 0–0.2)
BASOPHILS NFR BLD AUTO: 0.5 % (ref 0–1.5)
BILIRUB SERPL-MCNC: 0.4 MG/DL (ref 0–1.2)
BUN SERPL-MCNC: 24 MG/DL (ref 8–23)
BUN/CREAT SERPL: 25 (ref 7–25)
CALCIUM SPEC-SCNC: 10 MG/DL (ref 8.6–10.5)
CHLORIDE SERPL-SCNC: 106 MMOL/L (ref 98–107)
CO2 SERPL-SCNC: 24.7 MMOL/L (ref 22–29)
CREAT SERPL-MCNC: 0.96 MG/DL (ref 0.57–1)
DEPRECATED RDW RBC AUTO: 47.8 FL (ref 37–54)
EGFRCR SERPLBLD CKD-EPI 2021: 67.5 ML/MIN/1.73
EOSINOPHIL # BLD AUTO: 0.03 10*3/MM3 (ref 0–0.4)
EOSINOPHIL NFR BLD AUTO: 1.5 % (ref 0.3–6.2)
ERYTHROCYTE [DISTWIDTH] IN BLOOD BY AUTOMATED COUNT: 13.7 % (ref 12.3–15.4)
GLOBULIN UR ELPH-MCNC: 2.6 GM/DL
GLUCOSE SERPL-MCNC: 88 MG/DL (ref 65–99)
HCT VFR BLD AUTO: 34.8 % (ref 34–46.6)
HGB BLD-MCNC: 11 G/DL (ref 12–15.9)
HOLD SPECIMEN: NORMAL
HOLD SPECIMEN: NORMAL
LYMPHOCYTES # BLD AUTO: 0.49 10*3/MM3 (ref 0.7–3.1)
LYMPHOCYTES NFR BLD AUTO: 24.3 % (ref 19.6–45.3)
MCH RBC QN AUTO: 31.3 PG (ref 26.6–33)
MCHC RBC AUTO-ENTMCNC: 31.6 G/DL (ref 31.5–35.7)
MCV RBC AUTO: 98.9 FL (ref 79–97)
MONOCYTES # BLD AUTO: 0.15 10*3/MM3 (ref 0.1–0.9)
MONOCYTES NFR BLD AUTO: 7.4 % (ref 5–12)
NEUTROPHILS NFR BLD AUTO: 1.34 10*3/MM3 (ref 1.7–7)
NEUTROPHILS NFR BLD AUTO: 66.3 % (ref 42.7–76)
PLATELET # BLD AUTO: 67 10*3/MM3 (ref 140–450)
PMV BLD AUTO: 10.4 FL (ref 6–12)
POTASSIUM SERPL-SCNC: 4.2 MMOL/L (ref 3.5–5.2)
PROT SERPL-MCNC: 7.1 G/DL (ref 6–8.5)
RBC # BLD AUTO: 3.52 10*6/MM3 (ref 3.77–5.28)
SODIUM SERPL-SCNC: 140 MMOL/L (ref 136–145)
WBC NRBC COR # BLD AUTO: 2.02 10*3/MM3 (ref 3.4–10.8)

## 2024-06-24 PROCEDURE — 85025 COMPLETE CBC W/AUTO DIFF WBC: CPT

## 2024-06-24 PROCEDURE — 80053 COMPREHEN METABOLIC PANEL: CPT | Performed by: INTERNAL MEDICINE

## 2024-06-24 PROCEDURE — 1125F AMNT PAIN NOTED PAIN PRSNT: CPT | Performed by: INTERNAL MEDICINE

## 2024-06-24 PROCEDURE — 3075F SYST BP GE 130 - 139MM HG: CPT | Performed by: INTERNAL MEDICINE

## 2024-06-24 PROCEDURE — 3078F DIAST BP <80 MM HG: CPT | Performed by: INTERNAL MEDICINE

## 2024-06-24 PROCEDURE — 36415 COLL VENOUS BLD VENIPUNCTURE: CPT

## 2024-06-24 PROCEDURE — 99214 OFFICE O/P EST MOD 30 MIN: CPT | Performed by: INTERNAL MEDICINE

## 2024-06-24 RX ORDER — DIPHENOXYLATE HYDROCHLORIDE AND ATROPINE SULFATE 2.5; .025 MG/1; MG/1
1 TABLET ORAL 3 TIMES DAILY
Qty: 30 TABLET | Refills: 1 | Status: SHIPPED | OUTPATIENT
Start: 2024-06-24

## 2024-06-24 RX ORDER — ONDANSETRON 4 MG/1
8 TABLET, ORALLY DISINTEGRATING ORAL EVERY 8 HOURS PRN
Qty: 30 TABLET | Refills: 3 | Status: SHIPPED | OUTPATIENT
Start: 2024-06-24

## 2024-06-25 ENCOUNTER — SPECIALTY PHARMACY (OUTPATIENT)
Dept: PHARMACY | Facility: HOSPITAL | Age: 64
End: 2024-06-25
Payer: MEDICARE

## 2024-07-01 ENCOUNTER — OFFICE VISIT (OUTPATIENT)
Dept: FAMILY MEDICINE CLINIC | Facility: CLINIC | Age: 64
End: 2024-07-01
Payer: MEDICARE

## 2024-07-01 VITALS
RESPIRATION RATE: 18 BRPM | DIASTOLIC BLOOD PRESSURE: 67 MMHG | WEIGHT: 117.8 LBS | SYSTOLIC BLOOD PRESSURE: 111 MMHG | BODY MASS INDEX: 22.24 KG/M2 | OXYGEN SATURATION: 96 % | HEART RATE: 88 BPM | HEIGHT: 61 IN

## 2024-07-01 DIAGNOSIS — H10.9 CONJUNCTIVITIS OF BOTH EYES, UNSPECIFIED CONJUNCTIVITIS TYPE: Primary | ICD-10-CM

## 2024-07-01 PROCEDURE — 3078F DIAST BP <80 MM HG: CPT | Performed by: STUDENT IN AN ORGANIZED HEALTH CARE EDUCATION/TRAINING PROGRAM

## 2024-07-01 PROCEDURE — 99213 OFFICE O/P EST LOW 20 MIN: CPT | Performed by: STUDENT IN AN ORGANIZED HEALTH CARE EDUCATION/TRAINING PROGRAM

## 2024-07-01 PROCEDURE — 3074F SYST BP LT 130 MM HG: CPT | Performed by: STUDENT IN AN ORGANIZED HEALTH CARE EDUCATION/TRAINING PROGRAM

## 2024-07-01 PROCEDURE — 1125F AMNT PAIN NOTED PAIN PRSNT: CPT | Performed by: STUDENT IN AN ORGANIZED HEALTH CARE EDUCATION/TRAINING PROGRAM

## 2024-07-01 RX ORDER — OFLOXACIN 3 MG/ML
1 SOLUTION/ DROPS OPHTHALMIC 4 TIMES DAILY
Qty: 5 ML | Refills: 0 | Status: SHIPPED | OUTPATIENT
Start: 2024-07-01 | End: 2024-07-06

## 2024-07-01 NOTE — PROGRESS NOTES
"Chief Complaint  Chief Complaint   Patient presents with    Eye Problem     Redness and drainage     Subjective        Gladys Garrison is a 63 y.o. female who presents to [facility].    History of Present Illness  Here for acute visit.   Eyes have been watering for a couple weeks.  In the mornings she has green discharge that she has to wipe away.  They are irritated.    A lot of people are asking her if she is crying because they are so watery.  No runny nose, stuffy nose.  Mild sore throat occasionally.   She thought allergies initially but antihistamines have not been helpful.      Objective   /67   Pulse 88   Resp 18   Ht 154.9 cm (61\")   Wt 53.4 kg (117 lb 12.8 oz)   SpO2 96%   BMI 22.26 kg/m²     Estimated body mass index is 22.26 kg/m² as calculated from the following:    Height as of this encounter: 154.9 cm (61\").    Weight as of this encounter: 53.4 kg (117 lb 12.8 oz).     Physical Exam   GEN: In no acute distress, non toxic appearing  HEENT: Pupils equal and reactive to light, sclera clear. Watery.  No discoloration or discharge at the time of appointment     Result Review :              Assessment and Plan     Diagnoses and all orders for this visit:    1. Conjunctivitis of both eyes, unspecified conjunctivitis type (Primary)  Treat with ofloxacin eye drops qid x 5 days.  If no improvement update me.   -     ofloxacin (Ocuflox) 0.3 % ophthalmic solution; Administer 1 drop to both eyes 4 (Four) Times a Day for 5 days.  Dispense: 5 mL; Refill: 0         "

## 2024-07-14 PROCEDURE — 93294 REM INTERROG EVL PM/LDLS PM: CPT | Performed by: NURSE PRACTITIONER

## 2024-07-14 PROCEDURE — 93296 REM INTERROG EVL PM/IDS: CPT | Performed by: NURSE PRACTITIONER

## 2024-07-21 ENCOUNTER — TELEPHONE (OUTPATIENT)
Dept: FAMILY MEDICINE CLINIC | Facility: CLINIC | Age: 64
End: 2024-07-21
Payer: MEDICARE

## 2024-07-21 NOTE — TELEPHONE ENCOUNTER
Patient calling due to concern of urinary symptoms requesting antibiotics. Stated that she has had frequent UTI requiring antibiotics, most recent in May 2024. On-call provider reviewed previous urine cultures on file, which have all been negative for negative for growth or had mixed dinorah; both of which do not require antibiotics. Recommended patient be seen in clinic in morning to do exam and urine studies to help determine appropriate course of action in order to avoid inappropriate antibiotic use.     Blaise Arroyo MD

## 2024-07-22 PROCEDURE — 87086 URINE CULTURE/COLONY COUNT: CPT | Performed by: NURSE PRACTITIONER

## 2024-07-22 NOTE — TELEPHONE ENCOUNTER
Patient called back Monday 7/22 at 1:25pm to schedule an appt today but we are completely full with all providers. She went on about how she gets uti's frequently and she just needs an antibiotic. I directed her to the [facility] today and she said that is what she will do.

## 2024-07-25 ENCOUNTER — LAB (OUTPATIENT)
Dept: FAMILY MEDICINE CLINIC | Facility: CLINIC | Age: 64
End: 2024-07-25
Payer: MEDICARE

## 2024-07-25 ENCOUNTER — TELEPHONE (OUTPATIENT)
Dept: ONCOLOGY | Facility: CLINIC | Age: 64
End: 2024-07-25
Payer: MEDICARE

## 2024-07-25 ENCOUNTER — OFFICE VISIT (OUTPATIENT)
Dept: FAMILY MEDICINE CLINIC | Facility: CLINIC | Age: 64
End: 2024-07-25
Payer: MEDICARE

## 2024-07-25 VITALS
SYSTOLIC BLOOD PRESSURE: 138 MMHG | WEIGHT: 116.8 LBS | BODY MASS INDEX: 22.05 KG/M2 | HEIGHT: 61 IN | DIASTOLIC BLOOD PRESSURE: 72 MMHG | RESPIRATION RATE: 16 BRPM

## 2024-07-25 DIAGNOSIS — R35.0 URINARY FREQUENCY: Primary | ICD-10-CM

## 2024-07-25 DIAGNOSIS — R35.0 URINARY FREQUENCY: ICD-10-CM

## 2024-07-25 LAB
BILIRUB BLD-MCNC: NEGATIVE MG/DL
CLARITY, POC: CLEAR
COLOR UR: ABNORMAL
GLUCOSE UR STRIP-MCNC: NEGATIVE MG/DL
KETONES UR QL: NEGATIVE
LEUKOCYTE EST, POC: ABNORMAL
NITRITE UR-MCNC: POSITIVE MG/ML
PH UR: 5.5 [PH] (ref 5–8)
PROT UR STRIP-MCNC: NEGATIVE MG/DL
RBC # UR STRIP: ABNORMAL /UL
SP GR UR: 1.01 (ref 1–1.03)
UROBILINOGEN UR QL: ABNORMAL

## 2024-07-25 PROCEDURE — 1125F AMNT PAIN NOTED PAIN PRSNT: CPT | Performed by: INTERNAL MEDICINE

## 2024-07-25 PROCEDURE — 87086 URINE CULTURE/COLONY COUNT: CPT | Performed by: INTERNAL MEDICINE

## 2024-07-25 PROCEDURE — 81003 URINALYSIS AUTO W/O SCOPE: CPT | Performed by: INTERNAL MEDICINE

## 2024-07-25 PROCEDURE — 99213 OFFICE O/P EST LOW 20 MIN: CPT | Performed by: INTERNAL MEDICINE

## 2024-07-25 PROCEDURE — 3078F DIAST BP <80 MM HG: CPT | Performed by: INTERNAL MEDICINE

## 2024-07-25 PROCEDURE — 3075F SYST BP GE 130 - 139MM HG: CPT | Performed by: INTERNAL MEDICINE

## 2024-07-25 RX ORDER — SULFAMETHOXAZOLE AND TRIMETHOPRIM 800; 160 MG/1; MG/1
1 TABLET ORAL 2 TIMES DAILY
Qty: 10 TABLET | Refills: 0 | Status: SHIPPED | OUTPATIENT
Start: 2024-07-25

## 2024-07-25 NOTE — TELEPHONE ENCOUNTER
Returned pt call, let her know Dr. Nunez wants her to keep the appt tomorrow due to the medication she is currently on. Pt states she still wants to cancel due to her grandson's birthday. Will try to call back to reschedule

## 2024-07-25 NOTE — PROGRESS NOTES
Chief Complaint  Urinary Tract Infection    HPI:    Gladys Garrison presents to [facility]    Patient is a 63-year-old female with congenital defects including congenital hypoplastic kidney, tetralogy of Fallot, coarctation of the aorta, congenital VSD status postrepair, breast cancer status post mastectomy, sick sinus syndrome, complete heart block status post pacemaker placement, type 2 diabetes recently seen in urgent care on 7/22/2024 for 2 days of urinary symptoms including dysuria, urgency, frequency, and suprapubic pressure.  Urinalysis positive for leukocytes, nitrites, and blood.  Patient prescribed Macrobid and Pyridium.  Urine culture eventually notable for 50,000 CFU of normal urogenital dinorah.  Patient recommended to follow-up with PCP for further evaluation if symptoms persist.    Patient continues to have suprapubic tenderness, dysuria, urgency, frequency. Denies hematuria, flank pain, fever, chills, nausea, or vomiting. Denies abdominal pain, diarrhea, constipation, melena, hematochezia, or jaundice. She does have a history of UTI and previously has to have been on low dose antibiotics. Denies STIs. History of kidney stone but has not moved on prior imaging per patient. Patient has not previously followed with urology.  Denies recent antibiotics, hospital admissions, or multidrug resistant organisms. Patient staying well hydrated.  Symptoms overall about the same may be slightly better than previous.    Review of Systems:  ROS negative unless otherwise noted in HPI above.    Past Medical History:   Diagnosis Date    Allergic     Bone cancer     METASTATIC BONE; stage 4    Bradycardia     SECONDARY TO ABLATION    Breast cancer 2017    mets to lymph nodes; did not do radiation    Cancer of unknown origin     Compression fx, thoracic spine, open, initial encounter     Coronary artery disease     COVID-19 09/01/2021    Diabetes mellitus     Heart disease, unspecified      Hepatitis C     RESOLVED WITH MEDICATION    Hyperlipidemia     Hypertension     Obesity (BMI 30-39.9) 02/05/2021    Rib fracture     Per patient    Sleep apnea     no machine    Tachycardia     ATRIAL    Type 2 diabetes mellitus 11/2017         Current Outpatient Medications:     Abemaciclib (Verzenio) 150 MG tablet, Take 1 tablet by mouth 2 (Two) Times a Day., Disp: , Rfl:     Blood Glucose Monitoring Suppl (Accu-Chek Araceli) device, Use as instructed   To test blood sugar bid, Disp: 1 each, Rfl: 0    Calcium Carbonate-Vit D-Min (Calcium 600+D Plus Minerals) 600-400 MG-UNIT chewable tablet, Chew 600 mg 2 (Two) Times a Day., Disp: 60 each, Rfl: 6    Continuous Blood Gluc  (FreeStyle Rajeev 14 Day [place]) device, 1 each Daily., Disp: 1 each, Rfl: 0    Continuous Blood Gluc Sensor (FreeStyle Rajeev 14 Day Sensor) misc, 1 each Daily., Disp: 6 each, Rfl: 3    cyclobenzaprine (FLEXERIL) 10 MG tablet, Take 1 tablet by mouth 2 (Two) Times a Day As Needed for Muscle Spasms., Disp: 30 tablet, Rfl: 1    diphenoxylate-atropine (LOMOTIL) 2.5-0.025 MG per tablet, Take 1 tablet by mouth 3 (Three) Times a Day., Disp: 30 tablet, Rfl: 1    exemestane (AROMASIN) 25 MG tablet, Take 1 tablet by mouth Daily., Disp: , Rfl:     famotidine (PEPCID) 20 MG tablet, Take 1 tablet by mouth 2 (Two) Times a Day As Needed for Heartburn., Disp: , Rfl:     HYDROcodone-acetaminophen (NORCO)  MG per tablet, Take 1 tablet by mouth Every 8 (Eight) Hours As Needed for Moderate Pain., Disp: 90 tablet, Rfl: 0    ibuprofen (ADVIL,MOTRIN) 800 MG tablet, Take 1 tablet by mouth Every 6 (Six) Hours As Needed for Mild Pain., Disp: 90 tablet, Rfl: 0    insulin NPH-insulin regular (humuLIN 70/30,novoLIN 70/30) (70-30) 100 UNIT/ML injection, Inject 5 Units under the skin into the appropriate area as directed Every Evening. If BS over 180, Disp: , Rfl:     Insulin Pen Needle (B-D UF III MINI PEN NEEDLES) 31G X 5 MM misc, Use to inject insulin twice  "daily   DX: E11.9, Disp: 100 each, Rfl: 0    Insulin Syringe-Needle U-100 28G X 1/2\" 1 ML misc, 1 each 2 (Two) Times a Day., Disp: 100 each, Rfl: 6    losartan (Cozaar) 50 MG tablet, Take 1 tablet by mouth Daily. Discontinue the lisinopril due to interaction with new chemotherapy, Disp: 90 tablet, Rfl: 1    Morphine Sulfate, PF, 8 MG/ML solution, 8.5 mg by Intrathecal Infusion route Daily. Receives 8 mg through pain pump q 24 hrs, Disp: , Rfl:     nitrofurantoin, macrocrystal-monohydrate, (Macrobid) 100 MG capsule, Take 1 capsule by mouth 2 (Two) Times a Day., Disp: 14 capsule, Rfl: 0    ondansetron ODT (ZOFRAN-ODT) 4 MG disintegrating tablet, Place 2 tablets on the tongue Every 8 (Eight) Hours As Needed for Nausea or Vomiting., Disp: 30 tablet, Rfl: 3    rosuvastatin (Crestor) 20 MG tablet, Take 1 tablet by mouth Every Night., Disp: 90 tablet, Rfl: 0    phenazopyridine (Pyridium) 100 MG tablet, Take 1 tablet by mouth 3 (Three) Times a Day., Disp: 6 tablet, Rfl: 0    Social History     Socioeconomic History    Marital status: Legally     Number of children: 2   Tobacco Use    Smoking status: Never     Passive exposure: Never    Smokeless tobacco: Never   Vaping Use    Vaping status: Never Used   Substance and Sexual Activity    Alcohol use: Yes     Comment: drinks rarely    Drug use: Never    Sexual activity: Defer        Objective   Vital Signs:  /72   Resp 16   Ht 154.9 cm (61\")   Wt 53 kg (116 lb 12.8 oz)   BMI 22.07 kg/m²   Estimated body mass index is 22.07 kg/m² as calculated from the following:    Height as of this encounter: 154.9 cm (61\").    Weight as of this encounter: 53 kg (116 lb 12.8 oz).    Physical Exam:  General: Well-appearing patient, no apparent distress  Cardiac: Regular rate and rhythm, normal S1/S2, no murmur, rubs or gallops, no lower extremity edema  Lungs: Clear to auscultation bilaterally, no crackles or wheezes  Abdomen: Soft, non-tender, no guarding or rebound " tenderness, no hepatosplenomegaly  MSK: Grossly normal tone and strength  Neuro: Alert and oriented x3, CN II-XII grossly intact  Psych: Appropriate mood and affect    Assessment and Plan:    Dysuria, urgency, frequency  Assessment: Patient with a history of UTIs presenting with multiple urinary symptoms.  Patient recently had urine culture, which was negative, although continues to have persistent symptoms.  Patient reports similarly having similar symptoms, which has responded to Bactrim.  Will prescribe at patient request, although not certain patient having urine infection given multiple recent negative urine cultures.  Patient planning on following up with urology given frequency of urinary symptoms and negative urine cultures, especially in setting of known malignancy.  Plan:  - Urinalysis, urine culture  - Start bactrim 1 tablet BID for 5 days  - Follow up urine cultures, adjust antibiotics based on sensitivities  - Stay well hydrated  - Consider urology referral if persistent symptoms or recurrent future episodes    Patient was given instructions and counseling regarding her condition or for health maintenance advice. Please see specific information pulled into the AVS if appropriate.       Dr Blaise Arroyo   Internal Medicine Physician  [facility]  [address], [address]  [place] [address]

## 2024-07-25 NOTE — TELEPHONE ENCOUNTER
Caller: Gladys Garrison    Relationship to patient: Self    Best call back number: [phone]     Chief complaint: R/S TOMORROW'S APPT    Type of visit: LAB AND FOLLOW UP 1    Requested date: NEEDS A MONDAY AFTER AUG.5 , HER BONE SCAN IS NOW ON AUG.5, THIS WAS THE EARLIEST SHE COULD GET THAT SCHEDULED.  PT WAS UNABLE TO REMAIN ON THE CALL, ASKED FOR SCHEDULING TO CALL HER BACK TO RESCHEDULE THIS.    If rescheduling, when is the original appointment: TOMORROW 7/26

## 2024-07-25 NOTE — PATIENT INSTRUCTIONS
Urinary symptoms  - Urinalysis, urine culture  - Start bactrim 1 tablet twice a day for 5 days  - Follow up urine cultures, adjust antibiotics based on sensitivities  - Stay well hydrated  - Follow up with urology as scheduled

## 2024-07-27 LAB — BACTERIA SPEC AEROBE CULT: NORMAL

## 2024-08-05 ENCOUNTER — HOSPITAL ENCOUNTER (OUTPATIENT)
Dept: NUCLEAR MEDICINE | Facility: HOSPITAL | Age: 64
Discharge: HOME | End: 2024-08-05
Payer: MEDICARE

## 2024-08-05 ENCOUNTER — SPECIALTY PHARMACY (OUTPATIENT)
Dept: PHARMACY | Facility: HOSPITAL | Age: 64
End: 2024-08-05
Payer: MEDICARE

## 2024-08-05 ENCOUNTER — HOSPITAL ENCOUNTER (OUTPATIENT)
Dept: CT IMAGING | Facility: HOSPITAL | Age: 64
Discharge: HOME | End: 2024-08-05
Admitting: INTERNAL MEDICINE
Payer: MEDICARE

## 2024-08-05 DIAGNOSIS — C50.919 MALIGNANT NEOPLASM OF FEMALE BREAST, UNSPECIFIED ESTROGEN RECEPTOR STATUS, UNSPECIFIED LATERALITY, UNSPECIFIED SITE OF BREAST: ICD-10-CM

## 2024-08-05 DIAGNOSIS — C79.51 MALIGNANT NEOPLASM METASTATIC TO BONE: ICD-10-CM

## 2024-08-05 PROCEDURE — 78306 BONE IMAGING WHOLE BODY: CPT

## 2024-08-05 PROCEDURE — 74177 CT ABD & PELVIS W/CONTRAST: CPT

## 2024-08-05 PROCEDURE — 25510000001 IOPAMIDOL PER 1 ML: Performed by: INTERNAL MEDICINE

## 2024-08-05 PROCEDURE — 0 TECHNETIUM MEDRONATE KIT: Performed by: INTERNAL MEDICINE

## 2024-08-05 PROCEDURE — 71260 CT THORAX DX C+: CPT

## 2024-08-05 PROCEDURE — A9503 TC99M MEDRONATE: HCPCS | Performed by: INTERNAL MEDICINE

## 2024-08-05 RX ORDER — TC 99M MEDRONATE 20 MG/10ML
24.6 INJECTION, POWDER, LYOPHILIZED, FOR SOLUTION INTRAVENOUS
Status: COMPLETED | OUTPATIENT
Start: 2024-08-05 | End: 2024-08-05

## 2024-08-05 RX ADMIN — IOPAMIDOL 100 ML: 755 INJECTION, SOLUTION INTRAVENOUS at 12:20

## 2024-08-05 RX ADMIN — TC 99M MEDRONATE 24.6 MILLICURIE: 20 INJECTION, POWDER, LYOPHILIZED, FOR SOLUTION INTRAVENOUS at 12:47

## 2024-08-09 ENCOUNTER — TELEPHONE (OUTPATIENT)
Dept: FAMILY MEDICINE CLINIC | Facility: CLINIC | Age: 64
End: 2024-08-09

## 2024-08-09 NOTE — TELEPHONE ENCOUNTER
Caller: Gladys Garrison    Relationship to patient: Self    Best call back number: [phone]    Patient is needing:   [facility] HAS BEEN SENDING FAXES TO THE OFFICE REGARDING THE PRESCRIPTIONS FOR HER FREESTYLE RADHA EQUIPMENT.     THEY ARE ASKING FOR THE OFFICE TO RESPOND TO THE FAX THAT WAS SENT ON 8.5.24

## 2024-08-13 DIAGNOSIS — R94.8 ABNORMAL RADIONUCLIDE BONE SCAN: Primary | ICD-10-CM

## 2024-08-14 DIAGNOSIS — Z51.5 PALLIATIVE CARE STATUS: ICD-10-CM

## 2024-08-14 RX ORDER — HYDROCODONE BITARTRATE AND ACETAMINOPHEN 10; 325 MG/1; MG/1
1 TABLET ORAL EVERY 8 HOURS PRN
Qty: 20 TABLET | Refills: 0 | Status: SHIPPED | OUTPATIENT
Start: 2024-08-14

## 2024-08-14 RX ORDER — IBUPROFEN 800 MG/1
800 TABLET ORAL EVERY 6 HOURS PRN
Qty: 90 TABLET | Refills: 2 | Status: SHIPPED | OUTPATIENT
Start: 2024-08-14

## 2024-08-19 ENCOUNTER — TELEPHONE (OUTPATIENT)
Dept: FAMILY MEDICINE CLINIC | Facility: CLINIC | Age: 64
End: 2024-08-19
Payer: MEDICARE

## 2024-08-19 DIAGNOSIS — R30.0 DYSURIA: Primary | ICD-10-CM

## 2024-08-19 RX ORDER — CEPHALEXIN 500 MG/1
500 CAPSULE ORAL 2 TIMES DAILY
Qty: 10 CAPSULE | Refills: 0 | Status: SHIPPED | OUTPATIENT
Start: 2024-08-19 | End: 2024-08-24

## 2024-08-19 NOTE — TELEPHONE ENCOUNTER
Caller: Gladys Garrison    Relationship: Self    Best call back number: [phone]     What medication are you requesting: ANTIBIOTIC     What are your current symptoms: STATES HAS A UTI     If a prescription is needed, what is your preferred pharmacy and phone number: [facility] [address] [place] [address] [address] [facility] [phone] [facility] [phone]      Additional notes: STATES CAN NOT COME INTO OFFICE FOR APPOINTMENT PLEASE CALL AND ADVISE

## 2024-08-23 ENCOUNTER — TELEPHONE (OUTPATIENT)
Dept: ONCOLOGY | Facility: CLINIC | Age: 64
End: 2024-08-23
Payer: MEDICARE

## 2024-08-23 ENCOUNTER — LAB (OUTPATIENT)
Dept: FAMILY MEDICINE CLINIC | Facility: CLINIC | Age: 64
End: 2024-08-23
Payer: MEDICARE

## 2024-08-23 ENCOUNTER — OFFICE VISIT (OUTPATIENT)
Dept: FAMILY MEDICINE CLINIC | Facility: CLINIC | Age: 64
End: 2024-08-23
Payer: MEDICARE

## 2024-08-23 VITALS
DIASTOLIC BLOOD PRESSURE: 68 MMHG | HEIGHT: 61 IN | BODY MASS INDEX: 22.28 KG/M2 | SYSTOLIC BLOOD PRESSURE: 122 MMHG | HEART RATE: 86 BPM | WEIGHT: 118 LBS | OXYGEN SATURATION: 96 % | RESPIRATION RATE: 18 BRPM

## 2024-08-23 DIAGNOSIS — R23.3 PETECHIAL RASH: Primary | ICD-10-CM

## 2024-08-23 DIAGNOSIS — N39.0 URINARY TRACT INFECTION WITHOUT HEMATURIA, SITE UNSPECIFIED: ICD-10-CM

## 2024-08-23 DIAGNOSIS — R23.3 PETECHIAL RASH: ICD-10-CM

## 2024-08-23 LAB
ANION GAP SERPL CALCULATED.3IONS-SCNC: 10.8 MMOL/L (ref 5–15)
BASOPHILS # BLD MANUAL: 0.01 10*3/MM3 (ref 0–0.2)
BASOPHILS NFR BLD MANUAL: 1 % (ref 0–1.5)
BUN SERPL-MCNC: 15 MG/DL (ref 8–23)
BUN/CREAT SERPL: 18.8 (ref 7–25)
C3 SERPL-MCNC: 131 MG/DL (ref 82–167)
C4 SERPL-MCNC: 18 MG/DL (ref 14–44)
CALCIUM SPEC-SCNC: 9.2 MG/DL (ref 8.6–10.5)
CHLORIDE SERPL-SCNC: 107 MMOL/L (ref 98–107)
CO2 SERPL-SCNC: 25.2 MMOL/L (ref 22–29)
CREAT SERPL-MCNC: 0.8 MG/DL (ref 0.57–1)
DEPRECATED RDW RBC AUTO: 42.4 FL (ref 37–54)
EGFRCR SERPLBLD CKD-EPI 2021: 83.9 ML/MIN/1.73
EOSINOPHIL # BLD MANUAL: 0 10*3/MM3 (ref 0–0.4)
EOSINOPHIL NFR BLD MANUAL: 0 % (ref 0.3–6.2)
ERYTHROCYTE [DISTWIDTH] IN BLOOD BY AUTOMATED COUNT: 12.3 % (ref 12.3–15.4)
GLUCOSE SERPL-MCNC: 68 MG/DL (ref 65–99)
HCT VFR BLD AUTO: 33.6 % (ref 34–46.6)
HGB BLD-MCNC: 11.1 G/DL (ref 12–15.9)
LYMPHOCYTES # BLD MANUAL: 0.31 10*3/MM3 (ref 0.7–3.1)
LYMPHOCYTES NFR BLD MANUAL: 7.1 % (ref 5–12)
MCH RBC QN AUTO: 31.1 PG (ref 26.6–33)
MCHC RBC AUTO-ENTMCNC: 33 G/DL (ref 31.5–35.7)
MCV RBC AUTO: 94.1 FL (ref 79–97)
MONOCYTES # BLD: 0.09 10*3/MM3 (ref 0.1–0.9)
NEUTROPHILS # BLD AUTO: 0.82 10*3/MM3 (ref 1.7–7)
NEUTROPHILS NFR BLD MANUAL: 66.3 % (ref 42.7–76)
NRBC SPEC MANUAL: 1 /100 WBC (ref 0–0.2)
PLAT MORPH BLD: NORMAL
PLATELET # BLD AUTO: 54 10*3/MM3 (ref 140–450)
PMV BLD AUTO: 11.9 FL (ref 6–12)
POTASSIUM SERPL-SCNC: 3.7 MMOL/L (ref 3.5–5.2)
RBC # BLD AUTO: 3.57 10*6/MM3 (ref 3.77–5.28)
RBC MORPH BLD: NORMAL
SODIUM SERPL-SCNC: 143 MMOL/L (ref 136–145)
VARIANT LYMPHS NFR BLD MANUAL: 25.5 % (ref 19.6–45.3)
WBC MORPH BLD: NORMAL
WBC NRBC COR # BLD AUTO: 1.23 10*3/MM3 (ref 3.4–10.8)

## 2024-08-23 PROCEDURE — 85007 BL SMEAR W/DIFF WBC COUNT: CPT | Performed by: INTERNAL MEDICINE

## 2024-08-23 PROCEDURE — 86160 COMPLEMENT ANTIGEN: CPT | Performed by: INTERNAL MEDICINE

## 2024-08-23 PROCEDURE — 3074F SYST BP LT 130 MM HG: CPT | Performed by: INTERNAL MEDICINE

## 2024-08-23 PROCEDURE — 36415 COLL VENOUS BLD VENIPUNCTURE: CPT

## 2024-08-23 PROCEDURE — 86037 ANCA TITER EACH ANTIBODY: CPT | Performed by: INTERNAL MEDICINE

## 2024-08-23 PROCEDURE — 99214 OFFICE O/P EST MOD 30 MIN: CPT | Performed by: INTERNAL MEDICINE

## 2024-08-23 PROCEDURE — 1125F AMNT PAIN NOTED PAIN PRSNT: CPT | Performed by: INTERNAL MEDICINE

## 2024-08-23 PROCEDURE — 3078F DIAST BP <80 MM HG: CPT | Performed by: INTERNAL MEDICINE

## 2024-08-23 PROCEDURE — 85025 COMPLETE CBC W/AUTO DIFF WBC: CPT | Performed by: INTERNAL MEDICINE

## 2024-08-23 PROCEDURE — 86038 ANTINUCLEAR ANTIBODIES: CPT | Performed by: INTERNAL MEDICINE

## 2024-08-23 PROCEDURE — 80048 BASIC METABOLIC PNL TOTAL CA: CPT | Performed by: INTERNAL MEDICINE

## 2024-08-23 PROCEDURE — 83516 IMMUNOASSAY NONANTIBODY: CPT | Performed by: INTERNAL MEDICINE

## 2024-08-23 NOTE — PROGRESS NOTES
Chief Complaint  Leg Swelling (Red spots all over bottom of legs )    HPI:    Gladys Garrison presents to [facility]    Patient is a 63-year-old female with a history of hypertension, hyperlipidemia, complete heart block status post pacemaker placement, coronary artery disease, tetralogy of Fallot, CHF, type 2 diabetes, breast cancer presenting for evaluation of rash.     Patient noticed that she had a fairly acute onset of small erythematous lesions that came on suddenly yesterday. She noticed after she took off her blue jeans yesterday but did not notice when she put them on. Denies itching, pain, stinging. She has also noticed that her lower extremities are more swollen than usual. Nothing really makes better or worse. Follows with oncology for breast cancer. Recently held verzenio for about the last five days because she had a urinary tract infection. She is being treated for UTI with keflex, which was restarted on 8/19/2024. Denies new soaps, lotions, or creams. No new exposures that she is aware of. Denies additional abnormal bruises or bleeding.  Not on anticoagulation. Denies any known allergies that have been known to cause rashes.  Denies chest pain, shortness of breath, orthopnea, PND, and lower extremity edema. Denies palpitations, tachycardia, dizziness, lightheadedness, and syncope. Most recent echocardiogram from 3/10/2023 with EF of 50 to 60%.  Diastolic dysfunction present.  Severe tricuspid regurgitation present.      Review of Systems:  ROS negative unless otherwise noted in HPI above.    Past Medical History:   Diagnosis Date    Allergic     Bone cancer     METASTATIC BONE; stage 4    Bradycardia     SECONDARY TO ABLATION    Breast cancer 2017    mets to lymph nodes; did not do radiation    Cancer of unknown origin     Compression fx, thoracic spine, open, initial encounter     Coronary artery disease     COVID-19 09/01/2021    Diabetes mellitus     Heart  disease, unspecified     Hepatitis C     RESOLVED WITH MEDICATION    Hyperlipidemia     Hypertension     Obesity (BMI 30-39.9) 02/05/2021    Rib fracture     Per patient    Sleep apnea     no machine    Tachycardia     ATRIAL    Type 2 diabetes mellitus 11/2017         Current Outpatient Medications:     Abemaciclib (Verzenio) 150 MG tablet, Take 1 tablet by mouth 2 (Two) Times a Day., Disp: , Rfl:     Blood Glucose Monitoring Suppl (Accu-Chek Araceli) device, Use as instructed   To test blood sugar bid, Disp: 1 each, Rfl: 0    Calcium Carbonate-Vit D-Min (Calcium 600+D Plus Minerals) 600-400 MG-UNIT chewable tablet, Chew 600 mg 2 (Two) Times a Day., Disp: 60 each, Rfl: 6    cephalexin (Keflex) 500 MG capsule, Take 1 capsule by mouth 2 (Two) Times a Day for 5 days., Disp: 10 capsule, Rfl: 0    Continuous Blood Gluc  (FreeStyle Rajeev 14 Day [place]) device, 1 each Daily., Disp: 1 each, Rfl: 0    Continuous Blood Gluc Sensor (FreeStyle Rajeev 14 Day Sensor) misc, 1 each Daily., Disp: 6 each, Rfl: 3    cyclobenzaprine (FLEXERIL) 10 MG tablet, Take 1 tablet by mouth 2 (Two) Times a Day As Needed for Muscle Spasms., Disp: 30 tablet, Rfl: 1    diphenoxylate-atropine (LOMOTIL) 2.5-0.025 MG per tablet, Take 1 tablet by mouth 3 (Three) Times a Day., Disp: 30 tablet, Rfl: 1    exemestane (AROMASIN) 25 MG tablet, Take 1 tablet by mouth Daily., Disp: , Rfl:     famotidine (PEPCID) 20 MG tablet, Take 1 tablet by mouth 2 (Two) Times a Day As Needed for Heartburn., Disp: , Rfl:     HYDROcodone-acetaminophen (NORCO)  MG per tablet, Take 1 tablet by mouth Every 8 (Eight) Hours As Needed for Moderate Pain., Disp: 20 tablet, Rfl: 0    ibuprofen (ADVIL,MOTRIN) 800 MG tablet, Take 1 tablet by mouth Every 6 (Six) Hours As Needed for Mild Pain., Disp: 90 tablet, Rfl: 2    insulin NPH-insulin regular (humuLIN 70/30,novoLIN 70/30) (70-30) 100 UNIT/ML injection, Inject 5 Units under the skin into the appropriate area as directed  "Every Evening. If BS over 180, Disp: , Rfl:     Insulin Pen Needle (B-D UF III MINI PEN NEEDLES) 31G X 5 MM misc, Use to inject insulin twice daily   DX: E11.9, Disp: 100 each, Rfl: 0    Insulin Syringe-Needle U-100 28G X 1/2\" 1 ML misc, 1 each 2 (Two) Times a Day., Disp: 100 each, Rfl: 6    losartan (Cozaar) 50 MG tablet, Take 1 tablet by mouth Daily. Discontinue the lisinopril due to interaction with new chemotherapy, Disp: 90 tablet, Rfl: 1    Morphine Sulfate, PF, 8 MG/ML solution, 8.5 mg by Intrathecal Infusion route Daily. Receives 8 mg through pain pump q 24 hrs, Disp: , Rfl:     nitrofurantoin, macrocrystal-monohydrate, (Macrobid) 100 MG capsule, Take 1 capsule by mouth 2 (Two) Times a Day., Disp: 14 capsule, Rfl: 0    ondansetron ODT (ZOFRAN-ODT) 4 MG disintegrating tablet, Place 2 tablets on the tongue Every 8 (Eight) Hours As Needed for Nausea or Vomiting., Disp: 30 tablet, Rfl: 3    phenazopyridine (Pyridium) 100 MG tablet, Take 1 tablet by mouth 3 (Three) Times a Day., Disp: 6 tablet, Rfl: 0    rosuvastatin (Crestor) 20 MG tablet, Take 1 tablet by mouth Every Night., Disp: 90 tablet, Rfl: 0    sulfamethoxazole-trimethoprim (Bactrim DS) 800-160 MG per tablet, Take 1 tablet by mouth 2 (Two) Times a Day., Disp: 10 tablet, Rfl: 0    Social History     Socioeconomic History    Marital status: Legally     Number of children: 2   Tobacco Use    Smoking status: Never     Passive exposure: Never    Smokeless tobacco: Never   Vaping Use    Vaping status: Never Used   Substance and Sexual Activity    Alcohol use: Yes     Comment: drinks rarely    Drug use: Never    Sexual activity: Defer        Objective   Vital Signs:  There were no vitals taken for this visit.  Estimated body mass index is 22.07 kg/m² as calculated from the following:    Height as of 7/25/24: 154.9 cm (61\").    Weight as of 7/25/24: 53 kg (116 lb 12.8 oz).    Physical Exam:  General: Well-appearing patient, no apparent " distress  Cardiac: Regular rate and rhythm, normal S1/S2, no murmur, rubs or gallops, no lower extremity edema  Lungs: Clear to auscultation bilaterally, no crackles or wheezes  Abdomen: Soft, non-tender, no guarding or rebound tenderness, no hepatosplenomegaly  Skin: Diffuse, pinpoint, nonraised erythematous/purpleish lesions of the lower extremities bilaterally extending from the knees down to the ankles.  MSK: Grossly normal tone and strength  Neuro: Alert and oriented x3, CN II-XII grossly intact  Psych: Appropriate mood and affect        Assessment and Plan:    (R23.3) Petechial rash -   Assessment: Patient with sudden onset of bilateral lower extremity rash consistent with petechial rash.  Currently unclear etiology, but could potentially be from a variety of sources including thrombocytopenia as patient being treated for cancer, vasculitis, medication effect.  Less likely Keflex as patient just started antibiotic and has not had enough time to generate immune response.  Proceeding with basic laboratory workup to potentially identify cause.  Reviewed up-to-date side effects of Verzenio and did not see any evidence of petechiae or vasculitic rash.  Forwarding note to oncologist in case potentially could be related to malignancy or medication effect.  Patient aware of signs and symptoms which should prompt reevaluation.  Plan:   - CBC & Differential, Basic metabolic panel  - ANCA Panel, DEYVI, C4+C3  - CRP, ESR  - Notifying oncology of patient's condition  - Future plans considering additional workup and symptoms  - Consider dermatology referral  - Patient to follow-up if new or worsening symptoms    (N39.0) Urinary tract infection without hematuria, site unspecified  Assessment: Patient with improving urinary symptoms since recently starting on Keflex by PCP.  Plan:  - Continue Keflex as prescribed  - Stay well hydrated  - Notify clinic if not improving or new/worsening symptoms despite antibiotics     Patient  was given instructions and counseling regarding her condition or for health maintenance advice. Please see specific information pulled into the AVS if appropriate.       Dr Blaise Arroyo   Internal Medicine Physician  [facility], [address]  [place] [address]

## 2024-08-23 NOTE — Clinical Note
Saw patient in clinic today on behalf of of one of my partners.  Patient had sudden onset of petechial rash of the lower extremities bilaterally.  Lesions were punctate, erythematous/purplish and nonraised.  I included picture and note and picture.  Proceeding with laboratory workup to rule out possible causes.  She said that she was on Verzenio, which is on hold.  I did not see that this potentially could cause vasculitis/petechial rash, although I wanted to update you given patient is on medications I am not familiar with and also has had abnormal blood counts given malignancy and treatment. Thanks.   Blaise Arroyo MD

## 2024-08-24 ENCOUNTER — HOSPITAL ENCOUNTER (EMERGENCY)
Facility: HOSPITAL | Age: 64
Discharge: HOME | End: 2024-08-24
Attending: EMERGENCY MEDICINE
Payer: MEDICARE

## 2024-08-24 VITALS
BODY MASS INDEX: 23.68 KG/M2 | TEMPERATURE: 97.8 F | OXYGEN SATURATION: 96 % | WEIGHT: 120.59 LBS | DIASTOLIC BLOOD PRESSURE: 65 MMHG | HEIGHT: 60 IN | RESPIRATION RATE: 16 BRPM | SYSTOLIC BLOOD PRESSURE: 135 MMHG | HEART RATE: 60 BPM

## 2024-08-24 DIAGNOSIS — R23.3 PETECHIAE: ICD-10-CM

## 2024-08-24 DIAGNOSIS — D69.6 THROMBOCYTOPENIA: Primary | ICD-10-CM

## 2024-08-24 LAB
ANION GAP SERPL CALCULATED.3IONS-SCNC: 8.3 MMOL/L (ref 5–15)
ANISOCYTOSIS BLD QL: NORMAL
BASOPHILS # BLD AUTO: 0.01 10*3/MM3 (ref 0–0.2)
BASOPHILS NFR BLD AUTO: 0.8 % (ref 0–1.5)
BUN SERPL-MCNC: 13 MG/DL (ref 8–23)
BUN/CREAT SERPL: 14.3 (ref 7–25)
CALCIUM SPEC-SCNC: 9.5 MG/DL (ref 8.6–10.5)
CHLORIDE SERPL-SCNC: 107 MMOL/L (ref 98–107)
CO2 SERPL-SCNC: 26.7 MMOL/L (ref 22–29)
CREAT SERPL-MCNC: 0.91 MG/DL (ref 0.57–1)
DEPRECATED RDW RBC AUTO: 43.3 FL (ref 37–54)
EGFRCR SERPLBLD CKD-EPI 2021: 71.9 ML/MIN/1.73
EOSINOPHIL # BLD AUTO: 0.01 10*3/MM3 (ref 0–0.4)
EOSINOPHIL NFR BLD AUTO: 0.8 % (ref 0.3–6.2)
ERYTHROCYTE [DISTWIDTH] IN BLOOD BY AUTOMATED COUNT: 12.6 % (ref 12.3–15.4)
GLUCOSE SERPL-MCNC: 131 MG/DL (ref 65–99)
HCT VFR BLD AUTO: 31 % (ref 34–46.6)
HGB BLD-MCNC: 10 G/DL (ref 12–15.9)
IMM GRANULOCYTES # BLD AUTO: 0.01 10*3/MM3 (ref 0–0.05)
IMM GRANULOCYTES NFR BLD AUTO: 0.8 % (ref 0–0.5)
LYMPHOCYTES # BLD AUTO: 0.33 10*3/MM3 (ref 0.7–3.1)
LYMPHOCYTES NFR BLD AUTO: 26.4 % (ref 19.6–45.3)
MCH RBC QN AUTO: 30.7 PG (ref 26.6–33)
MCHC RBC AUTO-ENTMCNC: 32.3 G/DL (ref 31.5–35.7)
MCV RBC AUTO: 95.1 FL (ref 79–97)
MONOCYTES # BLD AUTO: 0.11 10*3/MM3 (ref 0.1–0.9)
MONOCYTES NFR BLD AUTO: 8.8 % (ref 5–12)
NEUTROPHILS NFR BLD AUTO: 0.78 10*3/MM3 (ref 1.7–7)
NEUTROPHILS NFR BLD AUTO: 62.4 % (ref 42.7–76)
NRBC BLD AUTO-RTO: 0 /100 WBC (ref 0–0.2)
PLATELET # BLD AUTO: 44 10*3/MM3 (ref 140–450)
PMV BLD AUTO: 11.2 FL (ref 6–12)
POTASSIUM SERPL-SCNC: 3.6 MMOL/L (ref 3.5–5.2)
RBC # BLD AUTO: 3.26 10*6/MM3 (ref 3.77–5.28)
SMALL PLATELETS BLD QL SMEAR: NORMAL
SODIUM SERPL-SCNC: 142 MMOL/L (ref 136–145)
WBC MORPH BLD: NORMAL
WBC NRBC COR # BLD AUTO: 1.25 10*3/MM3 (ref 3.4–10.8)

## 2024-08-24 PROCEDURE — 80048 BASIC METABOLIC PNL TOTAL CA: CPT | Performed by: EMERGENCY MEDICINE

## 2024-08-24 PROCEDURE — 85025 COMPLETE CBC W/AUTO DIFF WBC: CPT | Performed by: EMERGENCY MEDICINE

## 2024-08-24 PROCEDURE — 99283 EMERGENCY DEPT VISIT LOW MDM: CPT

## 2024-08-24 PROCEDURE — 85007 BL SMEAR W/DIFF WBC COUNT: CPT | Performed by: EMERGENCY MEDICINE

## 2024-08-24 RX ORDER — SODIUM CHLORIDE 0.9 % (FLUSH) 0.9 %
10 SYRINGE (ML) INJECTION AS NEEDED
Status: DISCONTINUED | OUTPATIENT
Start: 2024-08-24 | End: 2024-08-24 | Stop reason: HOSPADM

## 2024-08-24 NOTE — DISCHARGE INSTRUCTIONS
Do not resume cancer therapeutic drugs until see Dr. Nunez.  Follow-up Dr. Nunez this week, return new or worsening symptoms

## 2024-08-24 NOTE — ED NOTES
Pt presents to ED from work c/o pain in bilat LE described as someone squeezing her legs with slight red rash. Pt states she also has lower back pain that is chronic d/t breast CA that has metastasized to the bone. Pt states it has also spread to bilateral legs.

## 2024-08-24 NOTE — ED PROVIDER NOTES
Subjective   History of Present Illness  63-year-old female presents with rash on legs.  She started with this 2 days ago.  She saw PMD yesterday.  She reports no chest pain.  She states she always has a nighttime cough but no new cough no fevers no shortness of breath.  She is currently being treated for metastatic breast cancer.  She has been treated for UTI although it appears that all of her cultures have been negative in the past.  Review of Systems    Past Medical History:   Diagnosis Date    Allergic     Bone cancer     METASTATIC BONE; stage 4    Bradycardia     SECONDARY TO ABLATION    Breast cancer 2017    mets to lymph nodes; did not do radiation    Cancer of unknown origin     Compression fx, thoracic spine, open, initial encounter     Coronary artery disease     COVID-19 09/01/2021    Diabetes mellitus     Heart disease, unspecified     Hepatitis C     RESOLVED WITH MEDICATION    Hyperlipidemia     Hypertension     Obesity (BMI 30-39.9) 02/05/2021    Rib fracture     Per patient    Sleep apnea     no machine    Tachycardia     ATRIAL    Type 2 diabetes mellitus 11/2017       Allergies   Allergen Reactions    Oxycodone Nausea And Vomiting    Promethazine Other (See Comments)     Hyperactive mean    Tape Other (See Comments)     .blisters         Past Surgical History:   Procedure Laterality Date    BACK SURGERY      neck X 2    BREAST RECONSTRUCTION Bilateral     CARDIAC ABLATION       atrial tachycardia x 5 ablations     CARDIAC CATHETERIZATION      CARDIAC ELECTROPHYSIOLOGY PROCEDURE N/A 12/11/2023    Procedure: Pacemaker RV lead insertion with generator change out. VeliQtronic;  Surgeon: Steven Hammond MD;  Location: [facility] CATH INVASIVE LOCATION;  Service: Cardiovascular;  Laterality: N/A;    CARDIAC SURGERY      stent placed in aorta    CARDIAC SURGERY      6 surgeries as baby     CERVICAL FUSION ANTERIOR WITH ARTIFICIAL DISCECTOMY IMPLANTATION N/A 09/22/2020    Procedure: C4 VERTEBRECTOMY AND  ANTERIOR CERIVCAL DISCECTOMY WITH FUSION OF CERVICAL THREE THROUGH FIVE WITH REMOVAL OF HARDWARE C5-C6;  Surgeon: Mark Kaba MD;  Location: [facility] MAIN OR;  Service: Neurosurgery;  Laterality: N/A;     SECTION      x2    COLONOSCOPY N/A 10/23/2020    Procedure: COLONOSCOPY WITH POLYPECTOMY X6;  Surgeon: Stanley Bourne MD;  Location: [facility] ENDOSCOPY;  Service: Gastroenterology;  Laterality: N/A;  POLYPS, INTERNAL HEMORRHOIDS    ENDOMETRIAL ABLATION      REMOVAL SCAR TISSUE UTERINE    ENDOSCOPY N/A 10/23/2020    Procedure: ESOPHAGOGASTRODUODENOSCOPY WITH BIOPSY X 1 AREA;  Surgeon: Stanley Bourne MD;  Location: [facility] ENDOSCOPY;  Service: Gastroenterology;  Laterality: N/A;  GASTRITIS, ESOPHAGITIS, HIATAL HERNIA    INSERT / REPLACE / REMOVE PACEMAKER      KNEE SURGERY      MASTECTOMY Bilateral     NECK SURGERY      PACEMAKER IMPLANTATION      PAIN PUMP INSERTION/REVISION N/A 2022    Procedure: PAIN PUMP INSERTION AND INTRATHECAL CATHETER PLACEMENT;  Surgeon: Chuck Hunter MD;  Location: [facility] MAIN OR;  Service: Pain Management;  Laterality: N/A;       Family History   Problem Relation Age of Onset    Heart disease Mother     Stroke Mother     Lung cancer Mother     Aneurysm Father     Diabetes Sister     No Known Problems Brother     No Known Problems Brother     Diabetes type I Half-Sister     Thyroid cancer Half-Sister     Cancer Maternal Aunt     Heart attack Sister     Thyroid disease Sister     No Known Problems Sister        Social History     Socioeconomic History    Marital status: Legally     Number of children: 2   Tobacco Use    Smoking status: Never     Passive exposure: Never    Smokeless tobacco: Never   Vaping Use    Vaping status: Never Used   Substance and Sexual Activity    Alcohol use: Yes     Comment: drinks rarely    Drug use: Never    Sexual activity: Defer     Prior to Admission medications    Medication Sig Start Date End Date  Taking? Authorizing Provider   Abemaciclib (Verzenio) 150 MG tablet Take 1 tablet by mouth 2 (Two) Times a Day.    ProviderMagdalene MD   Blood Glucose Monitoring Suppl (Accu-Chek Araceli) device Use as instructed   To test blood sugar bid 10/25/21   Angelica Rodriguez MD   Calcium Carbonate-Vit D-Min (Calcium 600+D Plus Minerals) 600-400 MG-UNIT chewable tablet Chew 600 mg 2 (Two) Times a Day. 2/12/21   Мария Nunez MD   cephalexin (Keflex) 500 MG capsule Take 1 capsule by mouth 2 (Two) Times a Day for 5 days. 8/19/24 8/24/24  Chirag Robles DO   Continuous Blood Gluc  (FreeStyle Rajeev 14 Day Pullman) device 1 each Daily. 6/19/23   Chirag Robles DO   Continuous Blood Gluc Sensor (FreeStyle Rajeev 14 Day Sensor) misc 1 each Daily. 6/19/23   Chirag Robles DO   cyclobenzaprine (FLEXERIL) 10 MG tablet Take 1 tablet by mouth 2 (Two) Times a Day As Needed for Muscle Spasms. 1/2/24   Chirag Robles DO   diphenoxylate-atropine (LOMOTIL) 2.5-0.025 MG per tablet Take 1 tablet by mouth 3 (Three) Times a Day. 6/24/24   Мария Nunez MD   exemestane (AROMASIN) 25 MG tablet Take 1 tablet by mouth Daily.    ProviderMagdalene MD   famotidine (PEPCID) 20 MG tablet Take 1 tablet by mouth 2 (Two) Times a Day As Needed for Heartburn.    Magdalene Russell MD   HYDROcodone-acetaminophen (NORCO)  MG per tablet Take 1 tablet by mouth Every 8 (Eight) Hours As Needed for Moderate Pain. 8/14/24   Chuck Hunter MD   ibuprofen (ADVIL,MOTRIN) 800 MG tablet Take 1 tablet by mouth Every 6 (Six) Hours As Needed for Mild Pain. 8/14/24   Chuck Hunter MD   insulin NPH-insulin regular (humuLIN 70/30,novoLIN 70/30) (70-30) 100 UNIT/ML injection Inject 5 Units under the skin into the appropriate area as directed Every Evening. If BS over 180    Provider, MD Magdalene   Insulin Pen Needle (B-D UF III MINI PEN NEEDLES) 31G X 5 MM misc Use to inject insulin twice  "daily   DX: E11.9 8/22/22   Chirag Robles DO   Insulin Syringe-Needle U-100 28G X 1/2\" 1 ML misc 1 each 2 (Two) Times a Day. 11/18/22   Chirag Robles DO   losartan (Cozaar) 50 MG tablet Take 1 tablet by mouth Daily. Discontinue the lisinopril due to interaction with new chemotherapy 9/18/23   Chirag Robles DO   Morphine Sulfate, PF, 8 MG/ML solution 8.5 mg by Intrathecal Infusion route Daily. Receives 8 mg through pain pump q 24 hrs    Provider, MD Magdalene   nitrofurantoin, macrocrystal-monohydrate, (Macrobid) 100 MG capsule Take 1 capsule by mouth 2 (Two) Times a Day. 7/22/24   Rukhsana Holden APRN   ondansetron ODT (ZOFRAN-ODT) 4 MG disintegrating tablet Place 2 tablets on the tongue Every 8 (Eight) Hours As Needed for Nausea or Vomiting. 6/24/24   Мария Nunez MD   phenazopyridine (Pyridium) 100 MG tablet Take 1 tablet by mouth 3 (Three) Times a Day. 7/22/24   Rukhsana Holden APRN   rosuvastatin (Crestor) 20 MG tablet Take 1 tablet by mouth Every Night. 3/9/21   Preethi Vasquez APRN   sulfamethoxazole-trimethoprim (Bactrim DS) 800-160 MG per tablet Take 1 tablet by mouth 2 (Two) Times a Day. 7/25/24   Blaise Arroyo MD     Patient is not taking Bactrim is on Keflex        Objective   Physical Exam  63-year-old female awake alert.  Generally well-developed well-nourished.  Chest clear equal breath sounds.  Cardiovascular regular rate and rhythm abdomen soft nontender peer examination of legs she has noted to have petechiae to both lower extremities predominately from knee down.  She has neurovasc intact distally.  She has mild's calf tenderness symmetric.  Procedures           ED Course      Results for orders placed or performed during the hospital encounter of 08/24/24   Basic Metabolic Panel    Specimen: Blood   Result Value Ref Range    Glucose 131 (H) 65 - 99 mg/dL    BUN 13 8 - 23 mg/dL    Creatinine 0.91 0.57 - 1.00 mg/dL    Sodium 142 136 - 145 mmol/L    " "Potassium 3.6 3.5 - 5.2 mmol/L    Chloride 107 98 - 107 mmol/L    CO2 26.7 22.0 - 29.0 mmol/L    Calcium 9.5 8.6 - 10.5 mg/dL    BUN/Creatinine Ratio 14.3 7.0 - 25.0    Anion Gap 8.3 5.0 - 15.0 mmol/L    eGFR 71.9 >60.0 mL/min/1.73   CBC Auto Differential    Specimen: Blood   Result Value Ref Range    WBC 1.25 (C) 3.40 - 10.80 10*3/mm3    RBC 3.26 (L) 3.77 - 5.28 10*6/mm3    Hemoglobin 10.0 (L) 12.0 - 15.9 g/dL    Hematocrit 31.0 (L) 34.0 - 46.6 %    MCV 95.1 79.0 - 97.0 fL    MCH 30.7 26.6 - 33.0 pg    MCHC 32.3 31.5 - 35.7 g/dL    RDW 12.6 12.3 - 15.4 %    RDW-SD 43.3 37.0 - 54.0 fl    MPV 11.2 6.0 - 12.0 fL    Platelets 44 (C) 140 - 450 10*3/mm3    Neutrophil % 62.4 42.7 - 76.0 %    Lymphocyte % 26.4 19.6 - 45.3 %    Monocyte % 8.8 5.0 - 12.0 %    Eosinophil % 0.8 0.3 - 6.2 %    Basophil % 0.8 0.0 - 1.5 %    Immature Grans % 0.8 (H) 0.0 - 0.5 %    Neutrophils, Absolute 0.78 (L) 1.70 - 7.00 10*3/mm3    Lymphocytes, Absolute 0.33 (L) 0.70 - 3.10 10*3/mm3    Monocytes, Absolute 0.11 0.10 - 0.90 10*3/mm3    Eosinophils, Absolute 0.01 0.00 - 0.40 10*3/mm3    Basophils, Absolute 0.01 0.00 - 0.20 10*3/mm3    Immature Grans, Absolute 0.01 0.00 - 0.05 10*3/mm3    nRBC 0.0 0.0 - 0.2 /100 WBC   Scan Slide    Specimen: Blood   Result Value Ref Range    Anisocytosis Slight/1+ None Seen    WBC Morphology Normal Normal    Platelet Estimate Decreased Normal     *Note: Due to a large number of results and/or encounters for the requested time period, some results have not been displayed. A complete set of results can be found in Results Review.     No radiology results for the last day  Medications   sodium chloride 0.9 % flush 10 mL (has no administration in time range)     /61   Pulse 63   Temp 97.8 °F (36.6 °C) (Oral)   Resp 20   Ht 152.4 cm (60\")   Wt 54.7 kg (120 lb 9.5 oz)   LMP  (LMP Unknown)   SpO2 96%   BMI 23.55 kg/m²                                          Medical Decision Making  Problems " Addressed:  Petechiae: complicated acute illness or injury  Thrombocytopenia: complicated acute illness or injury    Amount and/or Complexity of Data Reviewed  Labs: ordered.    Risk  Prescription drug management.    Chart review: Patient had seen primary provider yesterday for petechiae.  Etiology unclear at that time per her note  Comorbidity: As per past history   Differential: Thrombocytopenia, vasculitis,  My EKG interpretation: Not indicated  Lab: White count 1.25 with hemoglobin 10 platelet count of 44 with 62 segs no bands basic metabolic panel 131  My Radiology review and interpretation: Ultrasound of leg considered but symptoms not consistent with DVT  Discussion/treatment: Patient's findings were discussed with her.  She was discussed with Dr. Woodard.  She is currently holding her chemotherapeutic agents.  Advised to continue with this.  To follow-up Dr. Nunez this week.  Repeat evaluation patient was eating without difficulty.  Patient was evaluated using appropriate PPE      Final diagnoses:   Thrombocytopenia   Petechiae       ED Disposition  ED Disposition       ED Disposition   Discharge    Condition   Stable    Comment   --               Мария Nunez MD  [address] [address]  [phone]               Medication List      No changes were made to your prescriptions during this visit.            Austin Panda MD  08/24/24 [id number]

## 2024-08-26 LAB — ANA SER QL: NEGATIVE

## 2024-08-26 NOTE — PROGRESS NOTES
Hematology/Oncology Outpatient Follow Up    PATIENT NAME:Gladys Garrison  :1960  MRN: [id number]  PRIMARY CARE PHYSICIAN: Chirag Robles DO  REFERRING PHYSICIAN: No ref. provider found      Chief Complaint   Patient presents with    Follow-up     Malignant neoplasm of female breast, unspecified estrogen receptor status, unspecified laterality, unspecified site of breast        HISTORY OF PRESENT ILLNESS:     This is a 63-year-old female who multiple comorbidities including congenital abnormalities such as hypoplastic kidney, tetralogy of Fallot, coarctation of the aorta and congenital VSD status post repair.  Patient developed syncopal episode and for that reason she had she had a CT scan of the chest which showed mass in the left breast.  She had diagnostic mammogram and ultrasound which showed a 2 cm spiculated mass in the left breast at the 1 o'clock position 6 cm from the nipple.  Biopsy of the left breast mass revealed invasive moderately differentiated carcinoma ER/HI positive and HER-2/bishop negative.  Patient also had an ultrasound of the axilla which was concerning for an abnormal left axillary lymph node with cortical thickening. She apparently had an FNA of the left axillary nodule which was positive for malignancy.  On 2017 patient underwent left modified radical mastectomy, right prophylactic mastectomy and immediate breast reconstruction. She also underwent right prophylactic mastectomy.  We have had her on records suggest that patient did have multifocal disease with pT2 pN1 aM0.  The largest focus measured 3.5 cm.  2 of 11 lymph nodes were positive for metastatic disease some with extracapsular extension.  Notes that patient did receive Arimidex neoadjuvant  from 2017 to 2018 prior to her bilateral mastectomies.    Review of her note suggest that she developed cough which she attributed to anastrozole and patient was then placed on tamoxifen.  She had MammaPrint  testing which returned with low risk for relapse.    Her postop course was also complicated by left chest wall abscess which resulted in I&D and removal of the left tissue expander. She was referred for radiation treatment boards ultimately declined.    Patient was then placed on tamoxifen in April 2018 which she stopped after less than a month due to nausea and vomiting.  Patient in the interim also was diagnosed with chronic hepatitis C was seen by the hepatologist.  Tamoxifen was dose reduced to 10 mg daily with the goal to increase to 20 mg daily.      Patient has relocated to [facility].  She has transitioned her care to us now.  She is currently not on any antiestrogen therapy.     Her bilateral mastectomy specimen are available for review    1/29/2021 patient had bone density which showed osteoporosis  Patient was seen by neurosurgery and had a bone scan done in March 2021 which showed subtle activity in the inferior L4 vertebral body appears to correlate with new area of sclerosis on CT of the abdomen and pelvis.  There is also subtle activity with possible new lesion at the posterior left sacrum.  These findings were concerning for metastatic disease.  PET CT scan was recommended to further evaluate.  4/8/2021 patient had a PET CT scan which showed evidence of disease in the neck chest abdomen and pelvis.  But she has a 1.1 cm mixed lytic/sclerotic hypermetabolic lesion within the left hemisacrum consistent with metastatic disease.  There is also accumulation within the inferior endplate of the L4 vertebral body thought to correspond to 1.2 centimeters sclerotic lesion consistent with metastatic disease.  There is a tiny hypermetabolic focus within the L3, T11.  Suspicious for also early metastatic disease.  4/26/2021 patient underwent CT-guided biopsy of sacral lesion, pathology was consistent with metastatic breast cancer ER and OK positive HER-2/bishop was negative.  7/6/21 CT new sclerosis within the  right 12th rib posteriorly which could represent metastatic lesion or a healed or healing fracture, new sclerosis within the right 12th rib posteriorly which         could represent metastatic lesion,new sclerosis within the right T8 transverse process worrisome   for metastatic        disease progression.  7/7/21 complaints of 8/10 back pain and 8/10 LUQ pain. Referral to radiation for questionable mets on CT chest.  7/26/2021 patient had CT scan of the head with contrast with did not show any evidence of metastatic disease.  CT scan of the chest abdomen and pelvis did not show any evidence of progressive disease.  7/26/2021 patient had CEA level which shows declining values CA 15-3 has decreased to 62 from 84  11/3/2021: Patient had CT scan of the chest, abdomen and pelvis. In the chest there were no suspicious pulmonary nodules. There is no clear evidence of progressive disease  12/13/2021 patient had a bone scan which showed uptake along the posterior right ribs consistent with rib fractures.  There is mild uptake in the L4 vertebral body corresponding to the sclerotic lesion seen on previous CT scan.  This was likely due to metastatic disease  10/6/2022: Patient has been lost to follow-up.  She stopped Ibrance due to pulmonary issues.  Apparently patient went to the emergency room and was told that Ibrance was causing lung damage.  October 6, 2022: She has a increasing CA 15-3 and CA 27.29 as well as CEA level.  10/19/2022: Patient had guardant 360 testing done which was negative  10/31/2022: Patient had bone scan which basically showed evidence for mild progression of multifocal osseous metastatic disease.  There appears to be new activity corresponding to the thoracic cervical spine, left scapula, left sacrum and left proximal femur.  CT scan of the chest otherwise is stable.  There is a nonobstructing stone on the left kidney.        Past Medical History:   Diagnosis Date    Allergic     Bone cancer      METASTATIC BONE; stage 4    Bradycardia     SECONDARY TO ABLATION    Breast cancer     mets to lymph nodes; did not do radiation    Cancer of unknown origin     Compression fx, thoracic spine, open, initial encounter     Coronary artery disease     COVID-19 2021    Diabetes mellitus     Heart disease, unspecified     Hepatitis C     RESOLVED WITH MEDICATION    Hyperlipidemia     Hypertension     Obesity (BMI 30-39.9) 2021    Rib fracture     Per patient    Sleep apnea     no machine    Tachycardia     ATRIAL    Type 2 diabetes mellitus 2017       Past Surgical History:   Procedure Laterality Date    BACK SURGERY      neck X 2    BREAST RECONSTRUCTION Bilateral     CARDIAC ABLATION       atrial tachycardia x 5 ablations     CARDIAC CATHETERIZATION      CARDIAC ELECTROPHYSIOLOGY PROCEDURE N/A 2023    Procedure: Pacemaker RV lead insertion with generator change out. [employer];  Surgeon: Steven Hammond MD;  Location: [facility] CATH INVASIVE LOCATION;  Service: Cardiovascular;  Laterality: N/A;    CARDIAC SURGERY      stent placed in aorta    CARDIAC SURGERY      6 surgeries as baby     CERVICAL FUSION ANTERIOR WITH ARTIFICIAL DISCECTOMY IMPLANTATION N/A 2020    Procedure: C4 VERTEBRECTOMY AND ANTERIOR CERIVCAL DISCECTOMY WITH FUSION OF CERVICAL THREE THROUGH FIVE WITH REMOVAL OF HARDWARE C5-C6;  Surgeon: Mark Kaba MD;  Location: [facility] MAIN OR;  Service: Neurosurgery;  Laterality: N/A;     SECTION      x2    COLONOSCOPY N/A 10/23/2020    Procedure: COLONOSCOPY WITH POLYPECTOMY X6;  Surgeon: Stanley Bourne MD;  Location: [facility] ENDOSCOPY;  Service: Gastroenterology;  Laterality: N/A;  POLYPS, INTERNAL HEMORRHOIDS    ENDOMETRIAL ABLATION      REMOVAL SCAR TISSUE UTERINE    ENDOSCOPY N/A 10/23/2020    Procedure: ESOPHAGOGASTRODUODENOSCOPY WITH BIOPSY X 1 AREA;  Surgeon: Stanley Bourne MD;  Location: [facility] ENDOSCOPY;  Service: Gastroenterology;   Laterality: N/A;  GASTRITIS, ESOPHAGITIS, HIATAL HERNIA    INSERT / REPLACE / REMOVE PACEMAKER      KNEE SURGERY  2000    MASTECTOMY Bilateral     NECK SURGERY      PACEMAKER IMPLANTATION      PAIN PUMP INSERTION/REVISION N/A 04/05/2022    Procedure: PAIN PUMP INSERTION AND INTRATHECAL CATHETER PLACEMENT;  Surgeon: Chuck Hunter MD;  Location: [facility] MAIN OR;  Service: Pain Management;  Laterality: N/A;         Current Outpatient Medications:     Abemaciclib (Verzenio) 150 MG tablet, Take 1 tablet by mouth 2 (Two) Times a Day., Disp: , Rfl:     Blood Glucose Monitoring Suppl (Accu-Chek Araceli) device, Use as instructed   To test blood sugar bid, Disp: 1 each, Rfl: 0    Calcium Carbonate-Vit D-Min (Calcium 600+D Plus Minerals) 600-400 MG-UNIT chewable tablet, Chew 600 mg 2 (Two) Times a Day., Disp: 60 each, Rfl: 6    Continuous Blood Gluc  (FreeStyle Rajeev 14 Day [place]) device, 1 each Daily., Disp: 1 each, Rfl: 0    Continuous Blood Gluc Sensor (FreeStyle Rajeev 14 Day Sensor) misc, 1 each Daily., Disp: 6 each, Rfl: 3    cyclobenzaprine (FLEXERIL) 10 MG tablet, Take 1 tablet by mouth 2 (Two) Times a Day As Needed for Muscle Spasms., Disp: 30 tablet, Rfl: 1    diphenoxylate-atropine (LOMOTIL) 2.5-0.025 MG per tablet, Take 1 tablet by mouth 3 (Three) Times a Day., Disp: 30 tablet, Rfl: 1    exemestane (AROMASIN) 25 MG tablet, Take 1 tablet by mouth Daily., Disp: , Rfl:     famotidine (PEPCID) 20 MG tablet, Take 1 tablet by mouth 2 (Two) Times a Day As Needed for Heartburn., Disp: , Rfl:     HYDROcodone-acetaminophen (NORCO)  MG per tablet, Take 1 tablet by mouth Every 8 (Eight) Hours As Needed for Moderate Pain., Disp: 20 tablet, Rfl: 0    ibuprofen (ADVIL,MOTRIN) 800 MG tablet, Take 1 tablet by mouth Every 6 (Six) Hours As Needed for Mild Pain., Disp: 90 tablet, Rfl: 2    insulin NPH-insulin regular (humuLIN 70/30,novoLIN 70/30) (70-30) 100 UNIT/ML injection, Inject 5 Units under the skin into the  "appropriate area as directed Every Evening. If BS over 180, Disp: , Rfl:     Insulin Pen Needle (B-D UF III MINI PEN NEEDLES) 31G X 5 MM misc, Use to inject insulin twice daily   DX: E11.9, Disp: 100 each, Rfl: 0    Insulin Syringe-Needle U-100 28G X 1/2\" 1 ML misc, 1 each 2 (Two) Times a Day., Disp: 100 each, Rfl: 6    losartan (Cozaar) 50 MG tablet, Take 1 tablet by mouth Daily. Discontinue the lisinopril due to interaction with new chemotherapy, Disp: 90 tablet, Rfl: 1    Morphine Sulfate, PF, 8 MG/ML solution, 8.5 mg by Intrathecal Infusion route Daily. Receives 8 mg through pain pump q 24 hrs, Disp: , Rfl:     nitrofurantoin, macrocrystal-monohydrate, (Macrobid) 100 MG capsule, Take 1 capsule by mouth 2 (Two) Times a Day., Disp: 14 capsule, Rfl: 0    ondansetron ODT (ZOFRAN-ODT) 4 MG disintegrating tablet, Place 2 tablets on the tongue Every 8 (Eight) Hours As Needed for Nausea or Vomiting., Disp: 30 tablet, Rfl: 3    phenazopyridine (Pyridium) 100 MG tablet, Take 1 tablet by mouth 3 (Three) Times a Day., Disp: 6 tablet, Rfl: 0    rosuvastatin (Crestor) 20 MG tablet, Take 1 tablet by mouth Every Night., Disp: 90 tablet, Rfl: 0    sulfamethoxazole-trimethoprim (Bactrim DS) 800-160 MG per tablet, Take 1 tablet by mouth 2 (Two) Times a Day., Disp: 10 tablet, Rfl: 0    Allergies   Allergen Reactions    Oxycodone Nausea And Vomiting    Promethazine Other (See Comments)     Hyperactive mean    Tape Other (See Comments)     .blisters         Family History   Problem Relation Age of Onset    Heart disease Mother     Stroke Mother     Lung cancer Mother     Aneurysm Father     Diabetes Sister     No Known Problems Brother     No Known Problems Brother     Diabetes type I Half-Sister     Thyroid cancer Half-Sister     Cancer Maternal Aunt     Heart attack Sister     Thyroid disease Sister     No Known Problems Sister        Cancer-related family history includes Cancer in her maternal aunt; Lung cancer in her mother; " Thyroid cancer in her half-sister.    Social History     Tobacco Use    Smoking status: Never     Passive exposure: Never    Smokeless tobacco: Never   Vaping Use    Vaping status: Never Used   Substance Use Topics    Alcohol use: Yes     Comment: drinks rarely    Drug use: Never       I have reviewed and confirmed the accuracy of the patient's history: Chief complaint, HPI, ROS, and Subjective as entered by the MA/LPN/RN. Мария Lian Nunez MD 08/30/24 8/30/24      SUBJECTIVE:      Patient is here today for follow-up.  She is established with pain group but feels she is not getting good relief.  She now has a nurse that comes to the house to help.  Insurance may be changing according to patient and she would like to see the       Gladys Garrison reports a pain score of 0.  Given her pain assessment as noted, treatment options were discussed and the following options were decided upon as a follow-up plan to address the patient's pain: continuation of current treatment plan for pain and referral to specialist for assistance in pain treatment guidance.       REVIEW OF SYSTEMS:     Review of Systems   Constitutional:  Negative for chills and fever.   HENT:  Negative for ear pain, mouth sores, nosebleeds and sore throat.    Eyes:  Negative for photophobia and visual disturbance.   Respiratory:  Negative for wheezing and stridor.    Cardiovascular:  Negative for chest pain and palpitations.   Gastrointestinal:  Negative for abdominal pain, diarrhea, nausea and vomiting.   Endocrine: Negative for cold intolerance and heat intolerance.   Genitourinary:  Negative for dysuria and hematuria.   Musculoskeletal:  Negative for joint swelling and neck stiffness.   Skin:  Negative for color change and rash.   Neurological:  Negative for seizures and syncope.   Hematological:  Negative for adenopathy.        No obvious bleeding   Psychiatric/Behavioral:  Negative for agitation, confusion and hallucinations.   "        OBJECTIVE:    Vitals:    08/30/24 1357   BP: 146/82   Pulse: 87   SpO2: 95%   Weight: 54.4 kg (120 lb)   Height: 152.4 cm (60\")   PainSc: 0-No pain                   Body mass index is 23.44 kg/m².    ECOG    (1) Restricted in physically strenuous activity, ambulatory and able to do work of light nature    Physical Exam  Vitals and nursing note reviewed.   Constitutional:       General: She is not in acute distress.     Appearance: Normal appearance. She is not diaphoretic.   HENT:      Head: Normocephalic and atraumatic.      Mouth/Throat:      Mouth: Mucous membranes are moist.      Pharynx: Oropharynx is clear.   Eyes:      General: No scleral icterus.        Right eye: No discharge.         Left eye: No discharge.      Extraocular Movements: Extraocular movements intact.      Conjunctiva/sclera: Conjunctivae normal.   Neck:      Thyroid: No thyromegaly.   Cardiovascular:      Rate and Rhythm: Normal rate and regular rhythm.      Pulses: Normal pulses.      Heart sounds: Normal heart sounds.      No friction rub. No gallop.   Pulmonary:      Effort: Pulmonary effort is normal. No respiratory distress.      Breath sounds: Normal breath sounds. No stridor. No wheezing.   Abdominal:      General: Bowel sounds are normal. There is distension.      Palpations: Abdomen is soft. There is no mass.      Tenderness: There is abdominal tenderness. There is guarding (Left upper abdomen). There is no rebound.   Musculoskeletal:         General: No tenderness. Normal range of motion.      Cervical back: Normal range of motion and neck supple.      Right lower leg: No edema.      Left lower leg: No edema.      Comments: Neck pain.  Patient has a neck collar.   Lymphadenopathy:      Cervical: No cervical adenopathy.   Skin:     General: Skin is warm and dry.      Capillary Refill: Capillary refill takes less than 2 seconds.      Findings: No bruising, erythema or rash.   Neurological:      Mental Status: She is alert and " oriented to person, place, and time.      Cranial Nerves: No cranial nerve deficit.      Sensory: No sensory deficit.      Motor: No abnormal muscle tone.   Psychiatric:         Mood and Affect: Mood normal.         Behavior: Behavior normal.         Thought Content: Thought content normal.         Judgment: Judgment normal.     No changes.        RECENT LABS    WBC   Date Value Ref Range Status   08/30/2024 1.90 (L) 3.40 - 10.80 10*3/mm3 Final     RBC   Date Value Ref Range Status   08/30/2024 3.50 (L) 3.77 - 5.28 10*6/mm3 Final     Hemoglobin   Date Value Ref Range Status   08/30/2024 10.9 (L) 12.0 - 15.9 g/dL Final     Hematocrit   Date Value Ref Range Status   08/30/2024 34.6 34.0 - 46.6 % Final     MCV   Date Value Ref Range Status   08/30/2024 98.9 (H) 79.0 - 97.0 fL Final     MCH   Date Value Ref Range Status   08/30/2024 31.1 26.6 - 33.0 pg Final     MCHC   Date Value Ref Range Status   08/30/2024 31.5 31.5 - 35.7 g/dL Final     RDW   Date Value Ref Range Status   08/30/2024 13.0 12.3 - 15.4 % Final     RDW-SD   Date Value Ref Range Status   08/30/2024 44.9 37.0 - 54.0 fl Final     MPV   Date Value Ref Range Status   08/30/2024 10.4 6.0 - 12.0 fL Final     Platelets   Date Value Ref Range Status   08/30/2024 45 (C) 140 - 450 10*3/mm3 Final     Neutrophil %   Date Value Ref Range Status   08/30/2024 68.5 42.7 - 76.0 % Final     Lymphocyte %   Date Value Ref Range Status   08/30/2024 18.4 (L) 19.6 - 45.3 % Final     Monocyte %   Date Value Ref Range Status   08/30/2024 12.1 (H) 5.0 - 12.0 % Final     Eosinophil %   Date Value Ref Range Status   08/30/2024 0.5 0.3 - 6.2 % Final     Basophil %   Date Value Ref Range Status   08/30/2024 0.5 0.0 - 1.5 % Final     Immature Grans %   Date Value Ref Range Status   08/24/2024 0.8 (H) 0.0 - 0.5 % Final     Neutrophils, Absolute   Date Value Ref Range Status   08/30/2024 1.30 (L) 1.70 - 7.00 10*3/mm3 Final     Lymphocytes, Absolute   Date Value Ref Range Status    08/30/2024 0.35 (L) 0.70 - 3.10 10*3/mm3 Final     Monocytes, Absolute   Date Value Ref Range Status   08/30/2024 0.23 0.10 - 0.90 10*3/mm3 Final     Eosinophils, Absolute   Date Value Ref Range Status   08/30/2024 0.01 0.00 - 0.40 10*3/mm3 Final     Basophils, Absolute   Date Value Ref Range Status   08/30/2024 0.01 0.00 - 0.20 10*3/mm3 Final     Immature Grans, Absolute   Date Value Ref Range Status   08/24/2024 0.01 0.00 - 0.05 10*3/mm3 Final     nRBC   Date Value Ref Range Status   08/24/2024 0.0 0.0 - 0.2 /100 WBC Final       Lab Results   Component Value Date    GLUCOSE 131 (H) 08/24/2024    BUN 13 08/24/2024    CREATININE 0.91 08/24/2024    EGFRIFNONA 70 12/20/2021    EGFRIFAFRI >60 10/12/2018    BCR 14.3 08/24/2024    K 3.6 08/24/2024    CO2 26.7 08/24/2024    CALCIUM 9.5 08/24/2024    PROTENTOTREF 7.4 12/14/2020    ALBUMIN 4.5 06/24/2024    LABIL2 0.9 12/14/2020    AST 25 06/24/2024    ALT 16 06/24/2024       ASSESSMENT:    Metastatic breast cancer presenting as 1.1 cm mixed lytic/sclerotic hypermetabolic lesion in the left hemisacrum suspicious for metastatic disease 1.2 cm sclerotic lesion on L4, small L3 lesion and T11 lesion.  Tumor is ER positive, WA positive and HER-2/bishop negative.  Patient was prescribed combination of Ibrance and Arimidex.  Continued Ibrance due to pulmonary toxicity.  She was then placed on single agent Arimidex.  Upon progression on Arimidex was switched to Faslodex.  She developed L1 vertebral body progressive disease on Faslodex for that reason we will discontinue Faslodex and begin combination of Aromasin and Afinitor.  Patient took Afinitor for 3 days and discontinued due to nausea and fatigue.  [id number] does not show any actionable mutation.  She does not have any soft tissue for us to biopsy for additional NGS testing.  Ongoing management  She is currently on Aromasin and abemaciclib stable.  Will hold due to progressive musculoskeletal pain with Aromasin.  Also she has  developed persistent thrombocytopenia on abemaciclib.  Her CT scans completed August 2024 shows overall stable disease with no evidence of progression.  I have encouraged her to have the plain films of the right knee that was recommended by radiologist  Abemaciclib induced diarrhea: Recommend use Lomotil to be used as needed  Fatigue secondary to abemaciclib: This is stable  Thrombocytopenia due to abemaciclib.  CBC reviewed  Bone metastasis:.  Biphosphonate's has been recommended patient has not been able to have due to ongoing dental issues  Right foot swelling: Doppler study was negative  History of medical noncompliance  Pancytopenia secondary to Ibrance: Improved off Ibrance  L1 vertebral body involvement.  Currently undergoing XRT.  Patient is also established with Dr. Ruff.  MRI of the spine has been scheduled for October 2023  Pain management  ypT2 N1 aM0 status post left modified radical mastectomy with lymph node dissection and right prophylactic mastectomy in 2017 performed at [facility].  ER positive, SD positive and HER-2/bishop negative.  Status post bilateral chest wall reconstruction.  According to patient, she tolerated Arimidex very well except that she also had osteoporosis therefore Arimidex was discontinued at that time  Intolerance of tamoxifen in the past  Osteoporosis: We will transition to Xgeva since she has bone metastases  Status post neoadjuvant Arimidex prior to bilateral mastectomies  Thrombocytopenia: Work-up was - December 2020  Neck pain status post cervical spine surgery: Resolved  Complex cardiac medical history including tetralogy of Fallot, coarctation of aorta, VSD status post repair.  Status post stent placement for coarctation of aorta  Personal history and strong family history of breast cancer in multiple in the relatives on paternal side of the family including 4 paternal aunts in their 30s and 40s and 2 maternal aunts at age of 20s and 50s.  There is  concern for possible hereditary breast cancer syndrome.  Patient may be  interested in pursuing cancer genetics for her management.  Assessment has been reviewed and updated          Discussion    Patient has metastatic breast cancer estrogen and progesterone dependent and HER-2/bishop negative.  She is currently on Arimidex.  We will add Ibrance.  We will also discontinue Prolia and begin Xgeva to help reduce skeletal events.           Plans:     Aromasin and abemaciclib   consult  Continue supportive care antinausea medications  Reviewed imaging results with patient  Pancytopenia is due to Verzenio  MT pharmacist follow-up with her next week  Checked tumor markers for CEA CA 15-3 and CA 27.29 reviewed  She has completed  palliative XRT to her lumbar spine  Follow-up with Dr. Ruff with neurosurgical services  Guardant 360 for NGS testing was negative for any actionable mutations  Follow-up with pain management with Dr. Hunter  Guardian 360 does not show any actionable mutation.  Would consider soft tissue biopsy at time of progression for additional NGS testing.  Reviewed with patient  Referred to pulmonary for her dyspnea: Dr. Julio.  Appointment is pending  Follow-up with pain management for ongoing pain issues  Follow-up with /counselor   Reviewed work-up for thrombocytopenia which was negative  Reviewed her bone density which showed osteoporosis  Schedule Xgeva 120 mg subcu monthly once her dental procedures are completed.  She is still waiting on a ida  All questions answered  Follow-up 4 weeks  All questions answered            Patient verbalized understanding and is in agreement of the above plan.           I spent 40 total minutes, face-to-face, caring for Gladys ross. 90% of this time involved counseling and/or coordination of care as documented within this note.

## 2024-08-27 LAB
C-ANCA TITR SER IF: NORMAL TITER
MYELOPEROXIDASE AB SER IA-ACNC: <0.2 UNITS (ref 0–0.9)
P-ANCA ATYPICAL TITR SER IF: NORMAL TITER
P-ANCA TITR SER IF: NORMAL TITER
PROTEINASE3 AB SER IA-ACNC: <0.2 UNITS (ref 0–0.9)

## 2024-08-30 ENCOUNTER — LAB (OUTPATIENT)
Dept: LAB | Facility: HOSPITAL | Age: 64
End: 2024-08-30
Payer: MEDICARE

## 2024-08-30 ENCOUNTER — OFFICE VISIT (OUTPATIENT)
Dept: ONCOLOGY | Facility: CLINIC | Age: 64
End: 2024-08-30
Payer: MEDICARE

## 2024-08-30 ENCOUNTER — SPECIALTY PHARMACY (OUTPATIENT)
Dept: PHARMACY | Facility: HOSPITAL | Age: 64
End: 2024-08-30
Payer: MEDICARE

## 2024-08-30 VITALS
WEIGHT: 120 LBS | BODY MASS INDEX: 23.56 KG/M2 | HEART RATE: 87 BPM | HEIGHT: 60 IN | SYSTOLIC BLOOD PRESSURE: 146 MMHG | OXYGEN SATURATION: 95 % | DIASTOLIC BLOOD PRESSURE: 82 MMHG

## 2024-08-30 DIAGNOSIS — C79.51 MALIGNANT NEOPLASM METASTATIC TO BONE: ICD-10-CM

## 2024-08-30 DIAGNOSIS — C50.919 MALIGNANT NEOPLASM OF FEMALE BREAST, UNSPECIFIED ESTROGEN RECEPTOR STATUS, UNSPECIFIED LATERALITY, UNSPECIFIED SITE OF BREAST: Primary | ICD-10-CM

## 2024-08-30 LAB
ALBUMIN SERPL-MCNC: 4 G/DL (ref 3.5–5.2)
ALBUMIN/GLOB SERPL: 1.7 G/DL
ALP SERPL-CCNC: 74 U/L (ref 39–117)
ALT SERPL W P-5'-P-CCNC: 19 U/L (ref 1–33)
ANION GAP SERPL CALCULATED.3IONS-SCNC: 11.1 MMOL/L (ref 5–15)
AST SERPL-CCNC: 33 U/L (ref 1–32)
BASOPHILS # BLD AUTO: 0.01 10*3/MM3 (ref 0–0.2)
BASOPHILS NFR BLD AUTO: 0.5 % (ref 0–1.5)
BILIRUB SERPL-MCNC: 0.3 MG/DL (ref 0–1.2)
BUN SERPL-MCNC: 14 MG/DL (ref 8–23)
BUN/CREAT SERPL: 17.9 (ref 7–25)
CALCIUM SPEC-SCNC: 9.2 MG/DL (ref 8.6–10.5)
CHLORIDE SERPL-SCNC: 107 MMOL/L (ref 98–107)
CO2 SERPL-SCNC: 23.9 MMOL/L (ref 22–29)
CREAT SERPL-MCNC: 0.78 MG/DL (ref 0.57–1)
DEPRECATED RDW RBC AUTO: 44.9 FL (ref 37–54)
EGFRCR SERPLBLD CKD-EPI 2021: 86.5 ML/MIN/1.73
EOSINOPHIL # BLD AUTO: 0.01 10*3/MM3 (ref 0–0.4)
EOSINOPHIL NFR BLD AUTO: 0.5 % (ref 0.3–6.2)
ERYTHROCYTE [DISTWIDTH] IN BLOOD BY AUTOMATED COUNT: 13 % (ref 12.3–15.4)
GLOBULIN UR ELPH-MCNC: 2.4 GM/DL
GLUCOSE SERPL-MCNC: 147 MG/DL (ref 65–99)
HCT VFR BLD AUTO: 34.6 % (ref 34–46.6)
HGB BLD-MCNC: 10.9 G/DL (ref 12–15.9)
HOLD SPECIMEN: NORMAL
HOLD SPECIMEN: NORMAL
LYMPHOCYTES # BLD AUTO: 0.35 10*3/MM3 (ref 0.7–3.1)
LYMPHOCYTES NFR BLD AUTO: 18.4 % (ref 19.6–45.3)
MCH RBC QN AUTO: 31.1 PG (ref 26.6–33)
MCHC RBC AUTO-ENTMCNC: 31.5 G/DL (ref 31.5–35.7)
MCV RBC AUTO: 98.9 FL (ref 79–97)
MONOCYTES # BLD AUTO: 0.23 10*3/MM3 (ref 0.1–0.9)
MONOCYTES NFR BLD AUTO: 12.1 % (ref 5–12)
NEUTROPHILS NFR BLD AUTO: 1.3 10*3/MM3 (ref 1.7–7)
NEUTROPHILS NFR BLD AUTO: 68.5 % (ref 42.7–76)
PLATELET # BLD AUTO: 45 10*3/MM3 (ref 140–450)
PMV BLD AUTO: 10.4 FL (ref 6–12)
POTASSIUM SERPL-SCNC: 4.2 MMOL/L (ref 3.5–5.2)
PROT SERPL-MCNC: 6.4 G/DL (ref 6–8.5)
RBC # BLD AUTO: 3.5 10*6/MM3 (ref 3.77–5.28)
SODIUM SERPL-SCNC: 142 MMOL/L (ref 136–145)
WBC NRBC COR # BLD AUTO: 1.9 10*3/MM3 (ref 3.4–10.8)

## 2024-08-30 PROCEDURE — 85025 COMPLETE CBC W/AUTO DIFF WBC: CPT

## 2024-08-30 PROCEDURE — 36415 COLL VENOUS BLD VENIPUNCTURE: CPT

## 2024-08-30 PROCEDURE — 80053 COMPREHEN METABOLIC PANEL: CPT | Performed by: INTERNAL MEDICINE

## 2024-09-03 NOTE — PROGRESS NOTES
Specialty Pharmacy Note: Verzenio (abemaciclib)--ON HOLD    Gladys Garrison is a 64 y.o. female with metastatic breast cancer was seen 8/30/24 by Dr. Nunez. Per provider dictation,  due to persistent thrrombocytopenia on abemaciclib, hold medication for one month and then resume at reduced dose..  Labs Review: The CMP and CBC from 8/30/24 have been reviewed.     Drug-Drug Interactions: The current medication list was reviewed and there are no relevant drug-drug interactions with the specialty medication.  Medication Allergies: The patient has no relevant allergies as it relates to their oral specialty medication  Review of Labs/Dose Adjustments: DOSE CHANGE - I reviewed the most recent note and labs. Due to persistent thrombocytopenia the dose is being decreased. I sent refills as described below.    Drug: Verzenio (abemaciclib)  Strength: 100 mg  Directions: Take 1 tablet by mouth twice a day  Quantity: 60  Refills: 5  Pharmacy prescription sent to:  Specialty Pharmacy upon MD signature    Name/Credentials  Eda HIRSCH PharmD      9/3/2024  10:49 EDT

## 2024-09-07 NOTE — TELEPHONE ENCOUNTER
PT CALLED ABOUT COMPRESSION SLEEVE. HER CARDIOLOGIST IS OUT OF OFFICE. PT ASKED IF SHE COULD USE ACE BANDAGE. OFFERED ED IF SITUATION IS WARRANTED. PLEASE ADVISE.   Robert Beauchamp(Attending)

## 2024-09-10 ENCOUNTER — SPECIALTY PHARMACY (OUTPATIENT)
Dept: PHARMACY | Facility: HOSPITAL | Age: 64
End: 2024-09-10
Payer: MEDICARE

## 2024-09-11 ENCOUNTER — OFFICE VISIT (OUTPATIENT)
Dept: PAIN MEDICINE | Facility: CLINIC | Age: 64
End: 2024-09-11
Payer: MEDICARE

## 2024-09-11 VITALS
BODY MASS INDEX: 23.83 KG/M2 | SYSTOLIC BLOOD PRESSURE: 148 MMHG | OXYGEN SATURATION: 92 % | RESPIRATION RATE: 16 BRPM | HEART RATE: 81 BPM | WEIGHT: 122 LBS | DIASTOLIC BLOOD PRESSURE: 90 MMHG

## 2024-09-11 DIAGNOSIS — C80.1 CANCER: ICD-10-CM

## 2024-09-11 DIAGNOSIS — Z51.5 HOSPICE CARE: ICD-10-CM

## 2024-09-11 DIAGNOSIS — Z79.899 HIGH RISK MEDICATION USE: Primary | ICD-10-CM

## 2024-09-11 DIAGNOSIS — Z51.5 PALLIATIVE CARE STATUS: ICD-10-CM

## 2024-09-11 PROCEDURE — 1125F AMNT PAIN NOTED PAIN PRSNT: CPT | Performed by: STUDENT IN AN ORGANIZED HEALTH CARE EDUCATION/TRAINING PROGRAM

## 2024-09-11 PROCEDURE — 3080F DIAST BP >= 90 MM HG: CPT | Performed by: STUDENT IN AN ORGANIZED HEALTH CARE EDUCATION/TRAINING PROGRAM

## 2024-09-11 PROCEDURE — 1159F MED LIST DOCD IN RCRD: CPT | Performed by: STUDENT IN AN ORGANIZED HEALTH CARE EDUCATION/TRAINING PROGRAM

## 2024-09-11 PROCEDURE — 99215 OFFICE O/P EST HI 40 MIN: CPT | Performed by: STUDENT IN AN ORGANIZED HEALTH CARE EDUCATION/TRAINING PROGRAM

## 2024-09-11 PROCEDURE — 1160F RVW MEDS BY RX/DR IN RCRD: CPT | Performed by: STUDENT IN AN ORGANIZED HEALTH CARE EDUCATION/TRAINING PROGRAM

## 2024-09-11 PROCEDURE — 3077F SYST BP >= 140 MM HG: CPT | Performed by: STUDENT IN AN ORGANIZED HEALTH CARE EDUCATION/TRAINING PROGRAM

## 2024-09-11 RX ORDER — HYDROCODONE BITARTRATE AND ACETAMINOPHEN 10; 325 MG/1; MG/1
1 TABLET ORAL EVERY 4 HOURS PRN
Qty: 180 TABLET | Refills: 0 | Status: SHIPPED | OUTPATIENT
Start: 2024-09-11

## 2024-09-13 ENCOUNTER — SPECIALTY PHARMACY (OUTPATIENT)
Dept: PHARMACY | Facility: HOSPITAL | Age: 64
End: 2024-09-13
Payer: MEDICARE

## 2024-09-13 ENCOUNTER — TELEPHONE (OUTPATIENT)
Dept: FAMILY MEDICINE CLINIC | Facility: CLINIC | Age: 64
End: 2024-09-13

## 2024-09-13 NOTE — TELEPHONE ENCOUNTER
Caller: Gladys Garrison    Relationship: Self    Best call back number:     [phone]        What was the call regarding:    [facility] HAS BEEN SENDING FAXES TO THE OFFICE REGARDING THE PRESCRIPTIONS FOR HER [employer] EQUIPMENT.   THEY NEED NEW PAPERWORK!        PT WAS GIVEN VO5 AND SOAP IN BIN

## 2024-09-17 ENCOUNTER — SPECIALTY PHARMACY (OUTPATIENT)
Dept: PHARMACY | Facility: HOSPITAL | Age: 64
End: 2024-09-17
Payer: MEDICARE

## 2024-09-18 NOTE — TELEPHONE ENCOUNTER
Caller: ARELIS WITH [employer]         Best call back number: [phone]      What was the call regarding:THEY RECEIVED THE CERTIFICATE OF MEDICAL NECESSITY BUT ARE STILL WAITING ON LAST OFFICE NOTE. PLEASE FAX.    FAX NUMBER [phone]

## 2024-09-19 ENCOUNTER — OFFICE VISIT (OUTPATIENT)
Dept: FAMILY MEDICINE CLINIC | Facility: CLINIC | Age: 64
End: 2024-09-19
Payer: MEDICARE

## 2024-09-19 VITALS
BODY MASS INDEX: 23.13 KG/M2 | HEIGHT: 60 IN | OXYGEN SATURATION: 92 % | DIASTOLIC BLOOD PRESSURE: 62 MMHG | HEART RATE: 60 BPM | WEIGHT: 117.8 LBS | RESPIRATION RATE: 16 BRPM | SYSTOLIC BLOOD PRESSURE: 118 MMHG

## 2024-09-19 DIAGNOSIS — R51.9 TEMPORAL PAIN: ICD-10-CM

## 2024-09-19 DIAGNOSIS — R42 LIGHTHEADEDNESS: ICD-10-CM

## 2024-09-19 DIAGNOSIS — R51.9 NEW ONSET OF HEADACHES: Primary | ICD-10-CM

## 2024-09-19 PROCEDURE — 1159F MED LIST DOCD IN RCRD: CPT | Performed by: NURSE PRACTITIONER

## 2024-09-19 PROCEDURE — 1125F AMNT PAIN NOTED PAIN PRSNT: CPT | Performed by: NURSE PRACTITIONER

## 2024-09-19 PROCEDURE — 3078F DIAST BP <80 MM HG: CPT | Performed by: NURSE PRACTITIONER

## 2024-09-19 PROCEDURE — G2211 COMPLEX E/M VISIT ADD ON: HCPCS | Performed by: NURSE PRACTITIONER

## 2024-09-19 PROCEDURE — 99213 OFFICE O/P EST LOW 20 MIN: CPT | Performed by: NURSE PRACTITIONER

## 2024-09-19 PROCEDURE — 3074F SYST BP LT 130 MM HG: CPT | Performed by: NURSE PRACTITIONER

## 2024-09-19 PROCEDURE — 1160F RVW MEDS BY RX/DR IN RCRD: CPT | Performed by: NURSE PRACTITIONER

## 2024-09-19 NOTE — TELEPHONE ENCOUNTER
Caller: KATHRYN    Relationship: Other [facility]    Best call back number: [phone]     What was the call regarding: THEY RECEIVED THE NOTE FROM 8/23/24 BUT IT IS NOT SIGNED AND MEDICARE REQUIRES A SIGNATURE. PLEASE REFAX ASAP  [phone]

## 2024-09-27 ENCOUNTER — LAB (OUTPATIENT)
Dept: LAB | Facility: HOSPITAL | Age: 64
End: 2024-09-27
Payer: MEDICARE

## 2024-09-27 ENCOUNTER — OFFICE VISIT (OUTPATIENT)
Dept: ONCOLOGY | Facility: CLINIC | Age: 64
End: 2024-09-27
Payer: MEDICARE

## 2024-09-27 VITALS
WEIGHT: 121.2 LBS | HEART RATE: 90 BPM | DIASTOLIC BLOOD PRESSURE: 79 MMHG | BODY MASS INDEX: 23.8 KG/M2 | TEMPERATURE: 97.6 F | HEIGHT: 60 IN | SYSTOLIC BLOOD PRESSURE: 121 MMHG | OXYGEN SATURATION: 96 % | RESPIRATION RATE: 17 BRPM

## 2024-09-27 DIAGNOSIS — C79.51 MALIGNANT NEOPLASM METASTATIC TO BONE: ICD-10-CM

## 2024-09-27 DIAGNOSIS — C50.919 MALIGNANT NEOPLASM OF FEMALE BREAST, UNSPECIFIED ESTROGEN RECEPTOR STATUS, UNSPECIFIED LATERALITY, UNSPECIFIED SITE OF BREAST: Primary | ICD-10-CM

## 2024-09-27 LAB
BASOPHILS # BLD AUTO: 0.01 10*3/MM3 (ref 0–0.2)
BASOPHILS NFR BLD AUTO: 0.2 % (ref 0–1.5)
DEPRECATED RDW RBC AUTO: 41.7 FL (ref 37–54)
EOSINOPHIL # BLD AUTO: 0.09 10*3/MM3 (ref 0–0.4)
EOSINOPHIL NFR BLD AUTO: 2.2 % (ref 0.3–6.2)
ERYTHROCYTE [DISTWIDTH] IN BLOOD BY AUTOMATED COUNT: 12.3 % (ref 12.3–15.4)
HCT VFR BLD AUTO: 35.6 % (ref 34–46.6)
HGB BLD-MCNC: 11.1 G/DL (ref 12–15.9)
HOLD SPECIMEN: NORMAL
HOLD SPECIMEN: NORMAL
LYMPHOCYTES # BLD AUTO: 0.74 10*3/MM3 (ref 0.7–3.1)
LYMPHOCYTES NFR BLD AUTO: 18 % (ref 19.6–45.3)
MCH RBC QN AUTO: 30.6 PG (ref 26.6–33)
MCHC RBC AUTO-ENTMCNC: 31.2 G/DL (ref 31.5–35.7)
MCV RBC AUTO: 98.1 FL (ref 79–97)
MONOCYTES # BLD AUTO: 0.27 10*3/MM3 (ref 0.1–0.9)
MONOCYTES NFR BLD AUTO: 6.6 % (ref 5–12)
NEUTROPHILS NFR BLD AUTO: 2.99 10*3/MM3 (ref 1.7–7)
NEUTROPHILS NFR BLD AUTO: 73 % (ref 42.7–76)
PLATELET # BLD AUTO: 83 10*3/MM3 (ref 140–450)
PMV BLD AUTO: 11.5 FL (ref 6–12)
RBC # BLD AUTO: 3.63 10*6/MM3 (ref 3.77–5.28)
WBC NRBC COR # BLD AUTO: 4.1 10*3/MM3 (ref 3.4–10.8)

## 2024-09-27 PROCEDURE — 36415 COLL VENOUS BLD VENIPUNCTURE: CPT

## 2024-09-27 PROCEDURE — 85025 COMPLETE CBC W/AUTO DIFF WBC: CPT

## 2024-09-30 ENCOUNTER — SPECIALTY PHARMACY (OUTPATIENT)
Dept: PHARMACY | Facility: HOSPITAL | Age: 64
End: 2024-09-30
Payer: MEDICARE

## 2024-09-30 NOTE — PROGRESS NOTES
Specialty Pharmacy Note: Verzenio (abemaciclib)    Gladys Garrison is a 64 y.o. female with metastatic breast cancer was seen 9/27/24 by Dr. Nunez. Per provider dictation, resume oral oncology regimen Verzenio (abemaciclib). Patient had been on hold due to thrombocytopenia as well as side effects of fatigue and diarrhea but per MD dictation, even with hold of medications, symptoms did not improve. Therefore, MD wants to resume at previous dose of 150mg PO BID.  Labs Review: The CBC from 9/27/24 have been reviewed. No dose adjustments are needed for the oral specialty medication(s) based on the labs.    Drug-Drug Interactions: The current medication list was reviewed and there are no relevant drug-drug interactions with the specialty medication.  Medication Allergies: The patient has no relevant allergies as it relates to their oral specialty medication    Drug: Verzenio (abemaciclib)  Strength: 150 mg  Directions: Take 1 tablet by mouth twice a day  Quantity: 60  Refills: 5  Pharmacy prescription sent to: [facility] upon MD signature    Specialty pharmacy will continue to follow patient.    Eda HIRSCH, PharmD    9/30/2024  08:20 EDT

## 2024-09-30 NOTE — PROGRESS NOTES
Drug: Verzenio (abemaciclib)  Strength: 150 mg  Directions: Take 1 tablet by mouth twice a day  Quantity: 60  Refills: 5  Pharmacy prescription sent to: [facility] upon MD signature  Completed independent double check on medication order/RX.  Madelyn Valerio, Rpjason, BCOP

## 2024-10-02 DIAGNOSIS — Z79.4 CONTROLLED TYPE 2 DIABETES MELLITUS WITHOUT COMPLICATION, WITH LONG-TERM CURRENT USE OF INSULIN: ICD-10-CM

## 2024-10-02 DIAGNOSIS — E11.9 CONTROLLED TYPE 2 DIABETES MELLITUS WITHOUT COMPLICATION, WITH LONG-TERM CURRENT USE OF INSULIN: ICD-10-CM

## 2024-10-02 RX ORDER — FLURBIPROFEN SODIUM 0.3 MG/ML
SOLUTION/ DROPS OPHTHALMIC
Qty: 100 EACH | Refills: 0 | Status: SHIPPED | OUTPATIENT
Start: 2024-10-02

## 2024-10-02 NOTE — TELEPHONE ENCOUNTER
Caller: Gladys Garrison MALLORIE    Relationship: Self    Best call back number: [phone]     Requested Prescriptions:   Requested Prescriptions     Pending Prescriptions Disp Refills    insulin NPH-insulin regular (humuLIN 70/30,novoLIN 70/30) (70-30) 100 UNIT/ML injection       Sig: Inject 5 Units under the skin into the appropriate area as directed Every Evening. If BS over 180    Insulin Pen Needle (B-D UF III MINI PEN NEEDLES) 31G X 5 MM misc 100 each 0     Sig: Use to inject insulin twice daily   DX: E11.9        Pharmacy where request should be sent: [place] [phone] [place] [phone]      Last office visit with prescribing clinician: 7/1/2024   Last telemedicine visit with prescribing clinician: Visit date not found   Next office visit with prescribing clinician: Visit date not found     Does the patient have less than a 3 day supply:  [] Yes  [x] No    Would you like a call back once the refill request has been completed: [] Yes [x] No    If the office needs to give you a call back, can they leave a voicemail: [] Yes [x] No    William Pearl Rep   10/02/24 15:18 EDT

## 2024-10-03 ENCOUNTER — OFFICE VISIT (OUTPATIENT)
Dept: FAMILY MEDICINE CLINIC | Facility: CLINIC | Age: 64
End: 2024-10-03
Payer: MEDICARE

## 2024-10-03 VITALS
HEIGHT: 60 IN | HEART RATE: 62 BPM | BODY MASS INDEX: 23.36 KG/M2 | WEIGHT: 119 LBS | OXYGEN SATURATION: 98 % | DIASTOLIC BLOOD PRESSURE: 72 MMHG | RESPIRATION RATE: 18 BRPM | SYSTOLIC BLOOD PRESSURE: 124 MMHG

## 2024-10-03 DIAGNOSIS — R23.3 PETECHIAL RASH: Primary | ICD-10-CM

## 2024-10-03 PROCEDURE — 99213 OFFICE O/P EST LOW 20 MIN: CPT | Performed by: NURSE PRACTITIONER

## 2024-10-03 PROCEDURE — 1160F RVW MEDS BY RX/DR IN RCRD: CPT | Performed by: NURSE PRACTITIONER

## 2024-10-03 PROCEDURE — 3078F DIAST BP <80 MM HG: CPT | Performed by: NURSE PRACTITIONER

## 2024-10-03 PROCEDURE — 3074F SYST BP LT 130 MM HG: CPT | Performed by: NURSE PRACTITIONER

## 2024-10-03 PROCEDURE — 1126F AMNT PAIN NOTED NONE PRSNT: CPT | Performed by: NURSE PRACTITIONER

## 2024-10-03 PROCEDURE — 1159F MED LIST DOCD IN RCRD: CPT | Performed by: NURSE PRACTITIONER

## 2024-10-03 NOTE — PROGRESS NOTES
"Chief Complaint  Rash (Appeared on legs yesterday) and Edema (Swelling ankles)    Subjective          Gladys Garrison presents to [facility]  History of Present Illness  Ms. Garrison is a patient of Dr. Robles who is previously unknown to me    She has a complex history significant for tetralogy of fallot, sick sinus syndrome with implanted pacemaker, thrombocytopenia, diabetes, breast cancer with mets to bone    She is here today with c/o petechial rash on ble with edema of both ankles and distal lower extremities    She has recently restarted verzenio at the direction of her oncologist, Dr. Nunez    She endorses that when she was on it previously she also developed a petechial rash on her lower extremities   She does not believe it could be the medicine because she has only been taking it again for 3 days    She denies any other bruising or bleeding, she denies any blood in the stool, or hematuria    She denies that she is feeling any more unwell than usual    She does c/o some mild le edema, is on her feet but not stationary at work, suggested she could try some support sock to help alleviate the edema  Also recommended that she stay well hydrated    Review of Systems   Constitutional:  Positive for fatigue.   Respiratory: Negative.     Cardiovascular: Negative.    Gastrointestinal:  Positive for constipation. Negative for blood in stool.   Genitourinary: Negative.  Negative for hematuria.   Musculoskeletal:  Positive for back pain and myalgias.     Objective   Vital Signs:  /72   Pulse 62   Resp 18   Ht 152.4 cm (60\")   Wt 54 kg (119 lb)   SpO2 98%   BMI 23.24 kg/m²     BP Readings from Last 3 Encounters:   10/03/24 124/72   09/27/24 121/79   09/19/24 118/62        Wt Readings from Last 3 Encounters:   10/03/24 54 kg (119 lb)   09/27/24 55 kg (121 lb 3.2 oz)   09/19/24 53.4 kg (117 lb 12.8 oz)              Physical Exam  Vitals reviewed.   Constitutional:       " Appearance: Normal appearance.   Cardiovascular:      Rate and Rhythm: Normal rate and regular rhythm.      Heart sounds: Normal heart sounds.   Pulmonary:      Effort: Pulmonary effort is normal.      Breath sounds: Normal breath sounds.   Abdominal:      General: Bowel sounds are normal. There is no distension.      Palpations: Abdomen is soft.      Tenderness: There is no abdominal tenderness.   Musculoskeletal:      Right lower leg: Edema present.      Left lower leg: Edema present.      Comments: Trace b/l ankle edema   Skin:     General: Skin is warm.      Comments: Petechial rash ble below the knees   Neurological:      Mental Status: She is alert and oriented to person, place, and time.   Psychiatric:         Mood and Affect: Mood normal.         Behavior: Behavior normal.        Result Review :     CMP          8/23/2024    10:12 8/24/2024    12:43 8/30/2024    13:29   CMP   Glucose 68  131  147    BUN 15  13  14    Creatinine 0.80  0.91  0.78    EGFR 83.9  71.9  86.5    Sodium 143  142  142    Potassium 3.7  3.6  4.2    Chloride 107  107  107    Calcium 9.2  9.5  9.2    Total Protein   6.4    Albumin   4.0    Globulin   2.4    Total Bilirubin   0.3    Alkaline Phosphatase   74    AST (SGOT)   33    ALT (SGPT)   19    Albumin/Globulin Ratio   1.7    BUN/Creatinine Ratio 18.8  14.3  17.9    Anion Gap 10.8  8.3  11.1        CBC w/diff          8/24/2024    12:43 8/30/2024    13:29 9/27/2024    11:58   CBC w/Diff   WBC 1.25  1.90  4.10    RBC 3.26  3.50  3.63    Hemoglobin 10.0  10.9  11.1    Hematocrit 31.0  34.6  35.6    MCV 95.1  98.9  98.1    MCH 30.7  31.1  30.6    MCHC 32.3  31.5  31.2    RDW 12.6  13.0  12.3    Platelets 44  45  83    Neutrophil Rel % 62.4  68.5  73.0    Immature Granulocyte Rel % 0.8      Lymphocyte Rel % 26.4  18.4  18.0    Monocyte Rel % 8.8  12.1  6.6    Eosinophil Rel % 0.8  0.5  2.2    Basophil Rel % 0.8  0.5  0.2                Assessment and Plan    Diagnoses and all orders for  this visit:    1. Petechial rash (Primary)    Advised her to seek advice and follow up with her oncologist, Dr. Nunez  Agreeable to call her office in the morning       Follow Up   Return as needed, for Next scheduled follow up.  Patient was given instructions and counseling regarding her condition or for health maintenance advice. Please see specific information pulled into the AVS if appropriate.

## 2024-10-04 ENCOUNTER — SPECIALTY PHARMACY (OUTPATIENT)
Dept: PHARMACY | Facility: HOSPITAL | Age: 64
End: 2024-10-04
Payer: MEDICARE

## 2024-10-04 DIAGNOSIS — C50.919 MALIGNANT NEOPLASM OF FEMALE BREAST, UNSPECIFIED ESTROGEN RECEPTOR STATUS, UNSPECIFIED LATERALITY, UNSPECIFIED SITE OF BREAST: ICD-10-CM

## 2024-10-04 DIAGNOSIS — C79.51 MALIGNANT NEOPLASM METASTATIC TO BONE: Primary | ICD-10-CM

## 2024-10-04 NOTE — PROGRESS NOTES
Specialty Pharmacy Note: Verzenio (abemaciclib)    Contacted patient to check for toxicities after restarting Verzenio. She reports that she did start medication on 9/30/24 and was at her PCP yesterday because her rash is back on both legs.  PCP note from 10/2 has documented petechial rash.  Discussed with Dr. Nunez and cbc ordered to evaluate her platelet count.  Patient offered appointment today but she said she won't come in today because she has too much to do and will come in on Monday.  Appointment made for Monday with cbc to evaluate labs.      Thanks,    Eda HIRSCH, PharmD

## 2024-10-09 ENCOUNTER — OFFICE VISIT (OUTPATIENT)
Dept: PAIN MEDICINE | Facility: CLINIC | Age: 64
End: 2024-10-09
Payer: MEDICARE

## 2024-10-09 VITALS
DIASTOLIC BLOOD PRESSURE: 65 MMHG | RESPIRATION RATE: 16 BRPM | HEART RATE: 63 BPM | OXYGEN SATURATION: 98 % | BODY MASS INDEX: 23.44 KG/M2 | WEIGHT: 120 LBS | SYSTOLIC BLOOD PRESSURE: 117 MMHG

## 2024-10-09 DIAGNOSIS — Z51.5 HOSPICE CARE: ICD-10-CM

## 2024-10-09 DIAGNOSIS — C80.1 CANCER: ICD-10-CM

## 2024-10-09 DIAGNOSIS — Z51.5 PALLIATIVE CARE STATUS: Primary | ICD-10-CM

## 2024-10-09 NOTE — PROGRESS NOTES
CHIEF COMPLAINT  Chief Complaint   Patient presents with    Follow-up     Hydrocodone LD 10-5-24       Primary Care  Chirag Robles, DO [place]   Gladys Garrison is a 64 y.o. female  who presents for cancer-related pain.  She has a significant history of breast cancer which is unfortunately metastatic.  She states she has lesions on her ribs as well as in her low back.  She describes pain in her low back which radiates in her legs as well as pain in her chest and side from reported rib fractures.  She is very hesitant to take any narcotic pain medication because she does not want to become dependent on medication.  She also has significant concerns regarding escalating pain requirements and inability to meet the requirements that she starts on narcotic pain medication.  She states in the past, she had excellent results with steroid injections for her shoulder knee pain.  She was referred by her oncologist for further management and nonnarcotic options.    Pain  Associated symptoms include arthralgias, chest pain, myalgias and neck pain.   Follow-upAssociated symptoms include: chest pain, neck pain and myalgias.        Location: Neck, back, ribs  Onset: Years ago  Duration: Gradually worsening  Timing: Throughout the day  Quality: Stabbing, aching  Severity: Today: 8       Last Week: 8       Worst: 9  Modifying Factors: The pain is fairly constant without any significant exacerbating or relieving factors    Physical Therapy: no    Interval Update 10/09/2024: She continues with hydrocodone for breakthrough on top of her pain pump.  Otherwise, no significant changes.  Is going to start back chemotherapy.  Complaining of significant constipation    The following portions of the patient's history were reviewed and updated as appropriate: allergies, current medications, past family history, past medical history, past social history, past surgical history and problem list.      Current Outpatient Medications:  "    Abemaciclib (VERZENIO) 150 mg tablet chemo tablet, Take 1 tablet by mouth 2 (Two) Times a Day. Take with or without food; Swallow whole, do not crush, chew or split tablets., Disp: 60 tablet, Rfl: 5    Blood Glucose Monitoring Suppl (Accu-Chek Araceli) device, Use as instructed   To test blood sugar bid, Disp: 1 each, Rfl: 0    Calcium Carbonate-Vit D-Min (Calcium 600+D Plus Minerals) 600-400 MG-UNIT chewable tablet, Chew 600 mg 2 (Two) Times a Day., Disp: 60 each, Rfl: 6    Continuous Blood Gluc  (FreeStyle Rajeev 14 Day [place]) device, 1 each Daily., Disp: 1 each, Rfl: 0    Continuous Blood Gluc Sensor (FreeStyle Rajeev 14 Day Sensor) misc, 1 each Daily., Disp: 6 each, Rfl: 3    diphenoxylate-atropine (LOMOTIL) 2.5-0.025 MG per tablet, Take 1 tablet by mouth 3 (Three) Times a Day., Disp: 30 tablet, Rfl: 1    exemestane (AROMASIN) 25 MG tablet, Take 1 tablet by mouth Daily., Disp: , Rfl:     famotidine (PEPCID) 20 MG tablet, Take 1 tablet by mouth 2 (Two) Times a Day As Needed for Heartburn., Disp: , Rfl:     HYDROcodone-acetaminophen (NORCO)  MG per tablet, Take 1 tablet by mouth Every 4 (Four) Hours As Needed for Moderate Pain., Disp: 180 tablet, Rfl: 0    ibuprofen (ADVIL,MOTRIN) 800 MG tablet, Take 1 tablet by mouth Every 6 (Six) Hours As Needed for Mild Pain., Disp: 90 tablet, Rfl: 2    insulin NPH-insulin regular (humuLIN 70/30,novoLIN 70/30) (70-30) 100 UNIT/ML injection, Inject 5 Units under the skin into the appropriate area as directed Every Evening. If BS over 180, Disp: 15 mL, Rfl: 3    Insulin Pen Needle (B-D UF III MINI PEN NEEDLES) 31G X 5 MM misc, Use to inject insulin twice daily   DX: E11.9, Disp: 100 each, Rfl: 0    Insulin Syringe-Needle U-100 28G X 1/2\" 1 ML misc, 1 each 2 (Two) Times a Day., Disp: 100 each, Rfl: 6    losartan (Cozaar) 50 MG tablet, Take 1 tablet by mouth Daily. Discontinue the lisinopril due to interaction with new chemotherapy, Disp: 90 tablet, Rfl: 1    " Morphine Sulfate, PF, 8 MG/ML solution, 8.5 mg by Intrathecal Infusion route Daily. Receives 8 mg through pain pump q 24 hrs, Disp: , Rfl:     ondansetron ODT (ZOFRAN-ODT) 4 MG disintegrating tablet, Place 2 tablets on the tongue Every 8 (Eight) Hours As Needed for Nausea or Vomiting., Disp: 30 tablet, Rfl: 3    rosuvastatin (Crestor) 20 MG tablet, Take 1 tablet by mouth Every Night., Disp: 90 tablet, Rfl: 0    cyclobenzaprine (FLEXERIL) 10 MG tablet, Take 1 tablet by mouth 2 (Two) Times a Day As Needed for Muscle Spasms. (Patient not taking: Reported on 10/9/2024), Disp: 30 tablet, Rfl: 1    Naloxegol Oxalate (Movantik) 12.5 MG tablet, Take 1 tablet by mouth Every Morning., Disp: 30 tablet, Rfl: 1    Review of Systems   Cardiovascular:  Positive for chest pain.   Genitourinary:  Positive for flank pain.   Musculoskeletal:  Positive for arthralgias, back pain, gait problem, myalgias and neck pain.       Vitals:    10/09/24 1507   BP: 117/65   Pulse: 63   Resp: 16   SpO2: 98%   Weight: 54.4 kg (120 lb)   PainSc:   9       Objective   Physical Exam  Vitals and nursing note reviewed.   Constitutional:       General: She is not in acute distress.     Appearance: Normal appearance. She is obese.   Musculoskeletal:         General: Normal range of motion.      Comments: Pump incisions clean, dry, intact.  Completely healed   Neurological:      General: No focal deficit present.      Mental Status: She is alert.      Sensory: No sensory deficit.      Motor: No weakness.           Assessment & Plan   Problems Addressed this Visit    None  Visit Diagnoses       Palliative care status    -  Primary    Hospice care        Cancer              Diagnoses         Codes Comments    Palliative care status    -  Primary ICD-10-CM: Z51.5  ICD-9-CM: V66.7     Hospice care     ICD-10-CM: Z51.5  ICD-9-CM: V66.7     Cancer     ICD-10-CM: C80.1  ICD-9-CM: 199.1             Plan:  Continue with intrathecal pump.  She is currently being  managed by a home health agency  Continue with hydrocodone for breakthrough pain  I can try Movantik for opioid-induced constipation  Continue with oncology for metastatic breast cancer  --- Follow-up 2 months             INSPECT REPORT    As part of the patient's treatment plan, I may be prescribing controlled substances. The patient has been made aware of appropriate use of such medications, including potential risk of somnolence, limited ability to drive and/or work safely, and the potential for dependence or overdose. It has also bee made clear that these medications are for use by this patient only, without concomitant use of alcohol or other substances unless prescribed.     Patient has completed prescribing agreement detailing terms of continued prescribing of controlled substances, including monitoring JULIA reports, urine drug screening, and pill counts if necessary. The patient is aware that inappropriate use will results in cessation of prescribing such medications.    INSPECT report has been reviewed and scanned into the patient's chart.    As the clinician, I personally reviewed the INSPECT from 10/8/2024.    History and physical exam exhibit continued safe and appropriate use of controlled substances.      EMR Dragon/Transcription disclaimer:   Much of this encounter note is an electronic transcription/translation of spoken language to printed text. The electronic translation of spoken language may permit erroneous, or at times, nonsensical words or phrases to be inadvertently transcribed; Although I have reviewed the note for such errors, some may still exist.          OTC childrens tylenol as needed

## 2024-10-10 ENCOUNTER — TELEPHONE (OUTPATIENT)
Dept: ONCOLOGY | Facility: CLINIC | Age: 64
End: 2024-10-10
Payer: MEDICARE

## 2024-10-11 ENCOUNTER — LAB (OUTPATIENT)
Dept: LAB | Facility: HOSPITAL | Age: 64
End: 2024-10-11
Payer: MEDICARE

## 2024-10-11 DIAGNOSIS — C50.919 MALIGNANT NEOPLASM OF FEMALE BREAST, UNSPECIFIED ESTROGEN RECEPTOR STATUS, UNSPECIFIED LATERALITY, UNSPECIFIED SITE OF BREAST: ICD-10-CM

## 2024-10-11 DIAGNOSIS — C79.51 MALIGNANT NEOPLASM METASTATIC TO BONE: ICD-10-CM

## 2024-10-11 LAB
ALBUMIN SERPL-MCNC: 4 G/DL (ref 3.5–5.2)
ALBUMIN/GLOB SERPL: 1.4 G/DL
ALP SERPL-CCNC: 80 U/L (ref 39–117)
ALT SERPL W P-5'-P-CCNC: 18 U/L (ref 1–33)
ANION GAP SERPL CALCULATED.3IONS-SCNC: 9.6 MMOL/L (ref 5–15)
AST SERPL-CCNC: 33 U/L (ref 1–32)
BASOPHILS # BLD AUTO: 0.01 10*3/MM3 (ref 0–0.2)
BASOPHILS NFR BLD AUTO: 0.4 % (ref 0–1.5)
BILIRUB SERPL-MCNC: 0.5 MG/DL (ref 0–1.2)
BUN SERPL-MCNC: 19 MG/DL (ref 8–23)
BUN/CREAT SERPL: 19.2 (ref 7–25)
CALCIUM SPEC-SCNC: 9.4 MG/DL (ref 8.6–10.5)
CHLORIDE SERPL-SCNC: 105 MMOL/L (ref 98–107)
CO2 SERPL-SCNC: 23.4 MMOL/L (ref 22–29)
CREAT SERPL-MCNC: 0.99 MG/DL (ref 0.57–1)
DEPRECATED RDW RBC AUTO: 40.5 FL (ref 37–54)
EGFRCR SERPLBLD CKD-EPI 2021: 64.6 ML/MIN/1.73
EOSINOPHIL # BLD AUTO: 0.05 10*3/MM3 (ref 0–0.4)
EOSINOPHIL NFR BLD AUTO: 2 % (ref 0.3–6.2)
ERYTHROCYTE [DISTWIDTH] IN BLOOD BY AUTOMATED COUNT: 11.8 % (ref 12.3–15.4)
GLOBULIN UR ELPH-MCNC: 2.9 GM/DL
GLUCOSE SERPL-MCNC: 76 MG/DL (ref 65–99)
HCT VFR BLD AUTO: 35.1 % (ref 34–46.6)
HGB BLD-MCNC: 10.9 G/DL (ref 12–15.9)
LYMPHOCYTES # BLD AUTO: 0.61 10*3/MM3 (ref 0.7–3.1)
LYMPHOCYTES NFR BLD AUTO: 24.8 % (ref 19.6–45.3)
MCH RBC QN AUTO: 30 PG (ref 26.6–33)
MCHC RBC AUTO-ENTMCNC: 31.1 G/DL (ref 31.5–35.7)
MCV RBC AUTO: 96.7 FL (ref 79–97)
MONOCYTES # BLD AUTO: 0.13 10*3/MM3 (ref 0.1–0.9)
MONOCYTES NFR BLD AUTO: 5.3 % (ref 5–12)
NEUTROPHILS NFR BLD AUTO: 1.66 10*3/MM3 (ref 1.7–7)
NEUTROPHILS NFR BLD AUTO: 67.5 % (ref 42.7–76)
PLATELET # BLD AUTO: 59 10*3/MM3 (ref 140–450)
PMV BLD AUTO: 8.8 FL (ref 6–12)
POTASSIUM SERPL-SCNC: 4.5 MMOL/L (ref 3.5–5.2)
PROT SERPL-MCNC: 6.9 G/DL (ref 6–8.5)
RBC # BLD AUTO: 3.63 10*6/MM3 (ref 3.77–5.28)
SODIUM SERPL-SCNC: 138 MMOL/L (ref 136–145)
WBC NRBC COR # BLD AUTO: 2.46 10*3/MM3 (ref 3.4–10.8)

## 2024-10-11 PROCEDURE — 85025 COMPLETE CBC W/AUTO DIFF WBC: CPT

## 2024-10-11 PROCEDURE — 36415 COLL VENOUS BLD VENIPUNCTURE: CPT

## 2024-10-11 PROCEDURE — 80053 COMPREHEN METABOLIC PANEL: CPT | Performed by: INTERNAL MEDICINE

## 2024-10-13 PROCEDURE — 93296 REM INTERROG EVL PM/IDS: CPT | Performed by: NURSE PRACTITIONER

## 2024-10-13 PROCEDURE — 93294 REM INTERROG EVL PM/LDLS PM: CPT | Performed by: NURSE PRACTITIONER

## 2024-10-15 ENCOUNTER — SPECIALTY PHARMACY (OUTPATIENT)
Dept: PHARMACY | Facility: HOSPITAL | Age: 64
End: 2024-10-15
Payer: MEDICARE

## 2024-10-15 NOTE — PROGRESS NOTES
Specialty Pharmacy Note: Verzenio (abemaciclib)    Gladys Garrison is a 64 y.o. female with metastatic breast cancer was seen 10/11/24 for labs.Labs Review: The CBC from 10/11/24 have been reviewed. No dose adjustments are needed for the oral specialty medication(s) based on the labs. Patient has grade 2 thrombocytopenia and per UpToDate, no abemaciclib dosage modification is required for grade 1 or 2 hematologic toxicity.  Will continue to monitor and patient is due back in clinic on 10/28/24.    Specialty pharmacy will continue to follow patient.    Eda HIRSCH, PharmD    10/15/2024  09:46 EDT

## 2024-10-28 ENCOUNTER — LAB (OUTPATIENT)
Dept: LAB | Facility: HOSPITAL | Age: 64
End: 2024-10-28
Payer: MEDICARE

## 2024-10-28 DIAGNOSIS — C79.51 MALIGNANT NEOPLASM METASTATIC TO BONE: ICD-10-CM

## 2024-10-28 DIAGNOSIS — C50.919 MALIGNANT NEOPLASM OF FEMALE BREAST, UNSPECIFIED ESTROGEN RECEPTOR STATUS, UNSPECIFIED LATERALITY, UNSPECIFIED SITE OF BREAST: ICD-10-CM

## 2024-10-28 LAB
ALBUMIN SERPL-MCNC: 4 G/DL (ref 3.5–5.2)
ALBUMIN/GLOB SERPL: 1.5 G/DL
ALP SERPL-CCNC: 67 U/L (ref 39–117)
ALT SERPL W P-5'-P-CCNC: 16 U/L (ref 1–33)
ANION GAP SERPL CALCULATED.3IONS-SCNC: 8.3 MMOL/L (ref 5–15)
AST SERPL-CCNC: 34 U/L (ref 1–32)
BASOPHILS # BLD AUTO: 0.01 10*3/MM3 (ref 0–0.2)
BASOPHILS NFR BLD AUTO: 0.7 % (ref 0–1.5)
BILIRUB SERPL-MCNC: 0.4 MG/DL (ref 0–1.2)
BUN SERPL-MCNC: 18 MG/DL (ref 8–23)
BUN/CREAT SERPL: 18.8 (ref 7–25)
CALCIUM SPEC-SCNC: 9.3 MG/DL (ref 8.6–10.5)
CHLORIDE SERPL-SCNC: 109 MMOL/L (ref 98–107)
CO2 SERPL-SCNC: 22.7 MMOL/L (ref 22–29)
CREAT SERPL-MCNC: 0.96 MG/DL (ref 0.57–1)
DEPRECATED RDW RBC AUTO: 42.6 FL (ref 37–54)
EGFRCR SERPLBLD CKD-EPI 2021: 67 ML/MIN/1.73
EOSINOPHIL # BLD AUTO: 0.01 10*3/MM3 (ref 0–0.4)
EOSINOPHIL NFR BLD AUTO: 0.7 % (ref 0.3–6.2)
ERYTHROCYTE [DISTWIDTH] IN BLOOD BY AUTOMATED COUNT: 12.7 % (ref 12.3–15.4)
GLOBULIN UR ELPH-MCNC: 2.6 GM/DL
GLUCOSE SERPL-MCNC: 121 MG/DL (ref 65–99)
HCT VFR BLD AUTO: 32.7 % (ref 34–46.6)
HGB BLD-MCNC: 10.3 G/DL (ref 12–15.9)
LYMPHOCYTES # BLD AUTO: 0.43 10*3/MM3 (ref 0.7–3.1)
LYMPHOCYTES NFR BLD AUTO: 29.7 % (ref 19.6–45.3)
MCH RBC QN AUTO: 30.5 PG (ref 26.6–33)
MCHC RBC AUTO-ENTMCNC: 31.5 G/DL (ref 31.5–35.7)
MCV RBC AUTO: 96.7 FL (ref 79–97)
MONOCYTES # BLD AUTO: 0.07 10*3/MM3 (ref 0.1–0.9)
MONOCYTES NFR BLD AUTO: 4.8 % (ref 5–12)
NEUTROPHILS NFR BLD AUTO: 0.93 10*3/MM3 (ref 1.7–7)
NEUTROPHILS NFR BLD AUTO: 64.1 % (ref 42.7–76)
PLATELET # BLD AUTO: 42 10*3/MM3 (ref 140–450)
PMV BLD AUTO: 10.2 FL (ref 6–12)
POTASSIUM SERPL-SCNC: 4.3 MMOL/L (ref 3.5–5.2)
PROT SERPL-MCNC: 6.6 G/DL (ref 6–8.5)
RBC # BLD AUTO: 3.38 10*6/MM3 (ref 3.77–5.28)
SODIUM SERPL-SCNC: 140 MMOL/L (ref 136–145)
WBC NRBC COR # BLD AUTO: 1.45 10*3/MM3 (ref 3.4–10.8)

## 2024-10-28 PROCEDURE — 85025 COMPLETE CBC W/AUTO DIFF WBC: CPT

## 2024-10-28 PROCEDURE — 36415 COLL VENOUS BLD VENIPUNCTURE: CPT

## 2024-10-28 PROCEDURE — 80053 COMPREHEN METABOLIC PANEL: CPT | Performed by: INTERNAL MEDICINE

## 2024-11-02 ENCOUNTER — HOSPITAL ENCOUNTER (EMERGENCY)
Facility: HOSPITAL | Age: 64
Discharge: HOME | End: 2024-11-02
Attending: EMERGENCY MEDICINE | Admitting: EMERGENCY MEDICINE
Payer: MEDICARE

## 2024-11-02 ENCOUNTER — APPOINTMENT (OUTPATIENT)
Dept: CT IMAGING | Facility: HOSPITAL | Age: 64
End: 2024-11-02
Payer: MEDICARE

## 2024-11-02 ENCOUNTER — APPOINTMENT (OUTPATIENT)
Dept: GENERAL RADIOLOGY | Facility: HOSPITAL | Age: 64
End: 2024-11-02
Payer: MEDICARE

## 2024-11-02 VITALS
OXYGEN SATURATION: 100 % | BODY MASS INDEX: 23.16 KG/M2 | HEART RATE: 62 BPM | WEIGHT: 118 LBS | HEIGHT: 60 IN | RESPIRATION RATE: 18 BRPM | TEMPERATURE: 98.4 F | SYSTOLIC BLOOD PRESSURE: 138 MMHG | DIASTOLIC BLOOD PRESSURE: 62 MMHG

## 2024-11-02 DIAGNOSIS — R55 SYNCOPE, UNSPECIFIED SYNCOPE TYPE: Primary | ICD-10-CM

## 2024-11-02 DIAGNOSIS — R51.9 NONINTRACTABLE HEADACHE, UNSPECIFIED CHRONICITY PATTERN, UNSPECIFIED HEADACHE TYPE: ICD-10-CM

## 2024-11-02 LAB
ANION GAP SERPL CALCULATED.3IONS-SCNC: 8.5 MMOL/L (ref 5–15)
BUN SERPL-MCNC: 21 MG/DL (ref 8–23)
BUN/CREAT SERPL: 19.6 (ref 7–25)
CALCIUM SPEC-SCNC: 9.4 MG/DL (ref 8.6–10.5)
CHLORIDE SERPL-SCNC: 108 MMOL/L (ref 98–107)
CO2 SERPL-SCNC: 25.5 MMOL/L (ref 22–29)
CREAT SERPL-MCNC: 1.07 MG/DL (ref 0.57–1)
DEPRECATED RDW RBC AUTO: 44.1 FL (ref 37–54)
EGFRCR SERPLBLD CKD-EPI 2021: 58.9 ML/MIN/1.73
EOSINOPHIL # BLD MANUAL: 0.01 10*3/MM3 (ref 0–0.4)
EOSINOPHIL NFR BLD MANUAL: 1 % (ref 0.3–6.2)
ERYTHROCYTE [DISTWIDTH] IN BLOOD BY AUTOMATED COUNT: 13.4 % (ref 12.3–15.4)
GLUCOSE SERPL-MCNC: 85 MG/DL (ref 65–99)
HCT VFR BLD AUTO: 28.9 % (ref 34–46.6)
HGB BLD-MCNC: 9.3 G/DL (ref 12–15.9)
LYMPHOCYTES # BLD MANUAL: 0.37 10*3/MM3 (ref 0.7–3.1)
LYMPHOCYTES NFR BLD MANUAL: 6 % (ref 5–12)
MCH RBC QN AUTO: 30.3 PG (ref 26.6–33)
MCHC RBC AUTO-ENTMCNC: 32.2 G/DL (ref 31.5–35.7)
MCV RBC AUTO: 94.1 FL (ref 79–97)
MONOCYTES # BLD: 0.07 10*3/MM3 (ref 0.1–0.9)
NEUTROPHILS # BLD AUTO: 0.71 10*3/MM3 (ref 1.7–7)
NEUTROPHILS NFR BLD MANUAL: 61 % (ref 42.7–76)
PLAT MORPH BLD: NORMAL
PLATELET # BLD AUTO: 60 10*3/MM3 (ref 140–450)
PMV BLD AUTO: 10.9 FL (ref 6–12)
POIKILOCYTOSIS BLD QL SMEAR: ABNORMAL
POTASSIUM SERPL-SCNC: 4.1 MMOL/L (ref 3.5–5.2)
RBC # BLD AUTO: 3.07 10*6/MM3 (ref 3.77–5.28)
SCAN SLIDE: NORMAL
SODIUM SERPL-SCNC: 142 MMOL/L (ref 136–145)
TROPONIN T SERPL HS-MCNC: 19 NG/L
VARIANT LYMPHS NFR BLD MANUAL: 32 % (ref 19.6–45.3)
WBC MORPH BLD: NORMAL
WBC NRBC COR # BLD AUTO: 1.16 10*3/MM3 (ref 3.4–10.8)

## 2024-11-02 PROCEDURE — 85025 COMPLETE CBC W/AUTO DIFF WBC: CPT | Performed by: EMERGENCY MEDICINE

## 2024-11-02 PROCEDURE — 99284 EMERGENCY DEPT VISIT MOD MDM: CPT

## 2024-11-02 PROCEDURE — 85007 BL SMEAR W/DIFF WBC COUNT: CPT | Performed by: EMERGENCY MEDICINE

## 2024-11-02 PROCEDURE — 93005 ELECTROCARDIOGRAM TRACING: CPT | Performed by: EMERGENCY MEDICINE

## 2024-11-02 PROCEDURE — 71045 X-RAY EXAM CHEST 1 VIEW: CPT

## 2024-11-02 PROCEDURE — 70450 CT HEAD/BRAIN W/O DYE: CPT

## 2024-11-02 PROCEDURE — 80048 BASIC METABOLIC PNL TOTAL CA: CPT | Performed by: EMERGENCY MEDICINE

## 2024-11-02 PROCEDURE — 84484 ASSAY OF TROPONIN QUANT: CPT | Performed by: EMERGENCY MEDICINE

## 2024-11-02 RX ORDER — SODIUM CHLORIDE 0.9 % (FLUSH) 0.9 %
10 SYRINGE (ML) INJECTION AS NEEDED
Status: DISCONTINUED | OUTPATIENT
Start: 2024-11-02 | End: 2024-11-02 | Stop reason: HOSPADM

## 2024-11-02 NOTE — ED PROVIDER NOTES
Subjective   History of Present Illness  Chief complaint: Syncope    64-year-old female presents after syncopal episode.  Patient states she was at work today when she apparently passed out.  She states she does not remember falling.  She states she has been having intermittent right-sided headaches for the last 2-1/2 months.  She has talked to her primary doctor about this and is being set up for an outpatient head CT.  She states she has had a headache today.  She denies any focal numbness or weakness.  She has had no chest pain or shortness of breath.  She denies any injuries from the fall.    History provided by:  Patient      Review of Systems   Constitutional:  Negative for fever.   HENT:  Negative for congestion.    Respiratory:  Negative for shortness of breath.    Cardiovascular:  Negative for chest pain and palpitations.   Gastrointestinal:  Negative for abdominal pain, diarrhea and vomiting.   Musculoskeletal:  Negative for back pain and neck pain.   Neurological:  Positive for syncope and headaches. Negative for weakness and numbness.   Psychiatric/Behavioral:  Negative for confusion.        Past Medical History:   Diagnosis Date    Allergic     Bone cancer     METASTATIC BONE; stage 4    Bradycardia     SECONDARY TO ABLATION    Breast cancer 2017    mets to lymph nodes; did not do radiation    Cancer of unknown origin     Compression fx, thoracic spine, open, initial encounter     Coronary artery disease     COVID-19 09/01/2021    Diabetes mellitus     Heart disease, unspecified     Hepatitis C     RESOLVED WITH MEDICATION    Hyperlipidemia     Hypertension     Obesity (BMI 30-39.9) 02/05/2021    Rib fracture     Per patient    Sleep apnea     no machine    Tachycardia     ATRIAL    Type 2 diabetes mellitus 11/2017       Allergies   Allergen Reactions    Oxycodone Nausea And Vomiting    Promethazine Other (See Comments)     Hyperactive mean    Tape Other (See Comments)     .blisters      Flexeril  [Cyclobenzaprine] Rash       Past Surgical History:   Procedure Laterality Date    BACK SURGERY      neck X 2    BREAST RECONSTRUCTION Bilateral     CARDIAC ABLATION       atrial tachycardia x 5 ablations     CARDIAC CATHETERIZATION      CARDIAC ELECTROPHYSIOLOGY PROCEDURE N/A 2023    Procedure: Pacemaker RV lead insertion with generator change out. [employer];  Surgeon: Steven Hammond MD;  Location: [facility] CATH INVASIVE LOCATION;  Service: Cardiovascular;  Laterality: N/A;    CARDIAC SURGERY      stent placed in aorta    CARDIAC SURGERY      6 surgeries as baby     CERVICAL FUSION ANTERIOR WITH ARTIFICIAL DISCECTOMY IMPLANTATION N/A 2020    Procedure: C4 VERTEBRECTOMY AND ANTERIOR CERIVCAL DISCECTOMY WITH FUSION OF CERVICAL THREE THROUGH FIVE WITH REMOVAL OF HARDWARE C5-C6;  Surgeon: Mark Kaba MD;  Location: [facility] MAIN OR;  Service: Neurosurgery;  Laterality: N/A;     SECTION      x2    COLONOSCOPY N/A 10/23/2020    Procedure: COLONOSCOPY WITH POLYPECTOMY X6;  Surgeon: Stanley Bourne MD;  Location: [facility] ENDOSCOPY;  Service: Gastroenterology;  Laterality: N/A;  POLYPS, INTERNAL HEMORRHOIDS    ENDOMETRIAL ABLATION      REMOVAL SCAR TISSUE UTERINE    ENDOSCOPY N/A 10/23/2020    Procedure: ESOPHAGOGASTRODUODENOSCOPY WITH BIOPSY X 1 AREA;  Surgeon: Stanley Bourne MD;  Location: [facility] ENDOSCOPY;  Service: Gastroenterology;  Laterality: N/A;  GASTRITIS, ESOPHAGITIS, HIATAL HERNIA    INSERT / REPLACE / REMOVE PACEMAKER      KNEE SURGERY      MASTECTOMY Bilateral     NECK SURGERY      PACEMAKER IMPLANTATION      PAIN PUMP INSERTION/REVISION N/A 2022    Procedure: PAIN PUMP INSERTION AND INTRATHECAL CATHETER PLACEMENT;  Surgeon: Chuck Hunter MD;  Location: [facility] MAIN OR;  Service: Pain Management;  Laterality: N/A;       Family History   Problem Relation Age of Onset    Heart disease Mother     Stroke Mother     Lung cancer Mother     Aneurysm  "Father     Diabetes Sister     No Known Problems Brother     No Known Problems Brother     Diabetes type I Half-Sister     Thyroid cancer Half-Sister     Cancer Maternal Aunt     Heart attack Sister     Thyroid disease Sister     No Known Problems Sister        Social History     Socioeconomic History    Marital status: Legally     Number of children: 2   Tobacco Use    Smoking status: Never     Passive exposure: Never    Smokeless tobacco: Never   Vaping Use    Vaping status: Never Used   Substance and Sexual Activity    Alcohol use: Yes     Comment: drinks rarely    Drug use: Never    Sexual activity: Defer       /61   Pulse 61   Temp 98.4 °F (36.9 °C) (Oral)   Resp 17   Ht 152.4 cm (60\")   Wt 53.5 kg (118 lb)   LMP  (LMP Unknown)   SpO2 100%   BMI 23.05 kg/m²       Objective   Physical Exam  Vitals and nursing note reviewed.   Constitutional:       Appearance: Normal appearance.   HENT:      Head: Normocephalic and atraumatic.      Mouth/Throat:      Mouth: Mucous membranes are moist.   Eyes:      Pupils: Pupils are equal, round, and reactive to light.   Cardiovascular:      Rate and Rhythm: Normal rate and regular rhythm.      Heart sounds: Normal heart sounds.   Pulmonary:      Effort: Pulmonary effort is normal. No respiratory distress.      Breath sounds: Normal breath sounds.   Abdominal:      Palpations: Abdomen is soft.      Tenderness: There is no abdominal tenderness.   Musculoskeletal:         General: No deformity or signs of injury.   Skin:     General: Skin is warm and dry.   Neurological:      General: No focal deficit present.      Mental Status: She is alert and oriented to person, place, and time.         Procedures           ED Course      My interpretation of EKG shows AV dual paced rhythm, rate of 63, unchanged from previous                             Results for orders placed or performed during the hospital encounter of 11/02/24   ECG 12 Lead Syncope    Collection " Time: 11/02/24  2:27 PM   Result Value Ref Range    QT Interval 490 ms    QTC Interval 504 ms   Basic Metabolic Panel    Collection Time: 11/02/24  4:00 PM    Specimen: Blood   Result Value Ref Range    Glucose 85 65 - 99 mg/dL    BUN 21 8 - 23 mg/dL    Creatinine 1.07 (H) 0.57 - 1.00 mg/dL    Sodium 142 136 - 145 mmol/L    Potassium 4.1 3.5 - 5.2 mmol/L    Chloride 108 (H) 98 - 107 mmol/L    CO2 25.5 22.0 - 29.0 mmol/L    Calcium 9.4 8.6 - 10.5 mg/dL    BUN/Creatinine Ratio 19.6 7.0 - 25.0    Anion Gap 8.5 5.0 - 15.0 mmol/L    eGFR 58.9 (L) >60.0 mL/min/1.73   Single High Sensitivity Troponin T    Collection Time: 11/02/24  4:00 PM    Specimen: Blood   Result Value Ref Range    HS Troponin T 19 (H) <14 ng/L   CBC Auto Differential    Collection Time: 11/02/24  4:00 PM    Specimen: Blood   Result Value Ref Range    WBC 1.16 (C) 3.40 - 10.80 10*3/mm3    RBC 3.07 (L) 3.77 - 5.28 10*6/mm3    Hemoglobin 9.3 (L) 12.0 - 15.9 g/dL    Hematocrit 28.9 (L) 34.0 - 46.6 %    MCV 94.1 79.0 - 97.0 fL    MCH 30.3 26.6 - 33.0 pg    MCHC 32.2 31.5 - 35.7 g/dL    RDW 13.4 12.3 - 15.4 %    RDW-SD 44.1 37.0 - 54.0 fl    MPV 10.9 6.0 - 12.0 fL    Platelets 60 (L) 140 - 450 10*3/mm3   Scan Slide    Collection Time: 11/02/24  4:00 PM    Specimen: Blood   Result Value Ref Range    Scan Slide     Manual Differential    Collection Time: 11/02/24  4:00 PM    Specimen: Blood   Result Value Ref Range    Neutrophil % 61.0 42.7 - 76.0 %    Lymphocyte % 32.0 19.6 - 45.3 %    Monocyte % 6.0 5.0 - 12.0 %    Eosinophil % 1.0 0.3 - 6.2 %    Neutrophils Absolute 0.71 (L) 1.70 - 7.00 10*3/mm3    Lymphocytes Absolute 0.37 (L) 0.70 - 3.10 10*3/mm3    Monocytes Absolute 0.07 (L) 0.10 - 0.90 10*3/mm3    Eosinophils Absolute 0.01 0.00 - 0.40 10*3/mm3    Poikilocytes Slight/1+ None Seen    WBC Morphology Normal Normal    Platelet Morphology Normal Normal     *Note: Due to a large number of results and/or encounters for the requested time period, some results  have not been displayed. A complete set of results can be found in Results Review.     CT Head Without Contrast    Result Date: 11/2/2024  Impression: No acute intracranial abnormality. Electronically Signed: Kevin Otto MD  11/2/2024 3:05 PM EDT  Workstation ID: [id number]    XR Chest 1 View    Result Date: 11/2/2024  Impression: No acute cardiopulmonary disease. Electronically Signed: Sudhir Montoya MD  11/2/2024 2:48 PM EDT  Workstation ID: [id number]               Medical Decision Making    Patient had the above valuation.  Results were discussed with the patient.  CT head shows no acute intracranial abnormality.  My interpretation of chest x-ray shows no infiltrate or effusion.  EKG shows no acute ischemia.  Troponin is borderline elevated at 19 however review of record shows this is baseline.  BMP is unremarkable.  CBC shows pancytopenia which is also baseline for the patient.  She has remained well-appearing in the emergency room.  She will be discharged to follow-up with her primary doctor and oncologist.      Final diagnoses:   Syncope, unspecified syncope type   Nonintractable headache, unspecified chronicity pattern, unspecified headache type       ED Disposition  ED Disposition       ED Disposition   Discharge    Condition   Stable    Comment   --               Chirag Robles,   [address]  [place] [address]  [phone]    Call in 2 days           Medication List      No changes were made to your prescriptions during this visit.            Ki Palacios MD  11/02/24 [id number]

## 2024-11-02 NOTE — ED NOTES
Chief Complaint   Patient presents with    Fall     EMS reports that pt was at work when pt passed out. Pt states she was unconscious for three minutes. Pt states she've been having headaches, f/u with pcp r/t h/a, pcp ordered CT, but CT is not for another month. Pt c/o h/a. Pt has a left limb restriction r/t lymph node removal. Pt also has a pacemaker.

## 2024-11-02 NOTE — DISCHARGE INSTRUCTIONS
Follow-up with your primary doctor and oncologist.  Return to the emergency room for any new or worsening symptoms or if you have any other questions or concerns.

## 2024-11-03 LAB
QT INTERVAL: 490 MS
QTC INTERVAL: 504 MS

## 2024-11-05 ENCOUNTER — SPECIALTY PHARMACY (OUTPATIENT)
Dept: PHARMACY | Facility: HOSPITAL | Age: 64
End: 2024-11-05
Payer: MEDICARE

## 2024-11-05 NOTE — PROGRESS NOTES
Re: Refills of Oral Specialty Medication - Verzenio (abemaciclib)    Drug-Drug Interactions: The current medication list was reviewed and there are no relevant drug-drug interactions with the specialty medication.  Medication Allergies: The patient has no relevant allergies as it relates to their oral specialty medication  Review of Labs/Dose Adjustments: DOSE CHANGE - I reviewed the most recent note and labs. Due to grade 3 thrombocytopenia the dose is being decreased per in basket from Dr. Nunez. I sent refills as described below.    Drug: Verzenio (abemaciclib)  Strength: 100 mg  Directions: Take 1 tablet by mouth twice a day  Quantity: 60  Refills: 5  Pharmacy prescription sent to: [facility] upon MD signature    Name/Credentials  Eda HIRSCH PharmD    11/5/2024  10:20 EST

## 2024-11-06 ENCOUNTER — DOCUMENTATION (OUTPATIENT)
Dept: PAIN MEDICINE | Facility: CLINIC | Age: 64
End: 2024-11-06
Payer: MEDICARE

## 2024-11-06 NOTE — PROGRESS NOTES
Sent pt letter of Dr. Hunter' offboarding and transfer of pain pump care to [facility].

## 2024-11-06 NOTE — PROGRESS NOTES
Re: Refills of Oral Specialty Medication - Verzenio (abemaciclib) - dose double check      Review of Labs/Dose Adjustments: DOSE CHANGE - I reviewed the most recent note and labs. Due to thrombocytopenia the dose is being reduced. I sent refills as described below.    Drug: Verzenio (abemaciclib)  Strength: 100 mg  Directions: Take 1 tablet by mouth twice a day  Quantity: 60  Refills: 5  Pharmacy prescription sent to: [facility] upon MD ramona Cowart, Pharm.D.    11/6/2024  08:51 EST

## 2024-11-07 ENCOUNTER — TELEPHONE (OUTPATIENT)
Dept: PHARMACY | Facility: HOSPITAL | Age: 64
End: 2024-11-07
Payer: MEDICARE

## 2024-11-07 NOTE — TELEPHONE ENCOUNTER
Attempted to reach patient via telephone to discuss dose change on medication.  She answered the phone and states she is at work and cannot talk and hung up.  If patient returns call, please direct to specialty pharmacy [phone]    Eda HIRSCH, PharmD

## 2024-11-09 ENCOUNTER — DOCUMENTATION (OUTPATIENT)
Dept: FAMILY MEDICINE CLINIC | Facility: CLINIC | Age: 64
End: 2024-11-09
Payer: MEDICARE

## 2024-11-09 RX ORDER — NITROFURANTOIN 25; 75 MG/1; MG/1
100 CAPSULE ORAL 2 TIMES DAILY
Qty: 10 CAPSULE | Refills: 0 | Status: SHIPPED | OUTPATIENT
Start: 2024-11-09 | End: 2024-11-14

## 2024-11-20 ENCOUNTER — TELEPHONE (OUTPATIENT)
Dept: PAIN MEDICINE | Facility: CLINIC | Age: 64
End: 2024-11-20
Payer: MEDICARE

## 2024-11-20 NOTE — TELEPHONE ENCOUNTER
Rec'd this email from [facility] regarding her medication refill:    [facility] patient, JOYCELYN Garrison 1960, is scheduled for an upcoming Home Connect/Refill visit with our nurse and we have not yet received a prescription order from your office.    Please submit an [facility] Home Connect/Refill medication order/prescription for this patient at your earliest convenience to avoid delays in compounding and shipping of the medication. You may submit an order through our online Prescriber Portal, [employer], or fax in a Home Connect custom order form.

## 2024-11-21 ENCOUNTER — TELEPHONE (OUTPATIENT)
Dept: PAIN MEDICINE | Facility: CLINIC | Age: 64
End: 2024-11-21
Payer: MEDICARE

## 2024-11-21 NOTE — TELEPHONE ENCOUNTER
Email received today from [facility]:    Good morning Dr. Hunter and Staff,  We have an [facility] patient, Gladys Garrison (1960) who is having to have her pump refilled every 2 months.  Would it be possible for her concentration to be increased by 25% to extend her refill interval as well as give room for dose increases if needed?    Ex. Morphine 5 mg/mL in 20 mLs would give an 80 day supply    If prescriber is open to this change, please send in a syringe order for this  new concentration for her 11/26/24 pump refill at your earliest convenience.    Please let us know if you have any questions or if we can assist in any way as we continue to serve you and your patients.  Thanks so much,  Jesika Jimenez, PharmD  Clinical Pharmacist  [address] [place] [address]  P: [phone]  [phone] ext. [phone]  F: [phone]

## 2024-12-03 ENCOUNTER — LAB (OUTPATIENT)
Dept: LAB | Facility: HOSPITAL | Age: 64
End: 2024-12-03
Payer: MEDICARE

## 2024-12-03 ENCOUNTER — SPECIALTY PHARMACY (OUTPATIENT)
Dept: PHARMACY | Facility: HOSPITAL | Age: 64
End: 2024-12-03
Payer: MEDICARE

## 2024-12-03 ENCOUNTER — OFFICE VISIT (OUTPATIENT)
Dept: ONCOLOGY | Facility: CLINIC | Age: 64
End: 2024-12-03
Payer: MEDICARE

## 2024-12-03 VITALS
HEART RATE: 60 BPM | RESPIRATION RATE: 18 BRPM | BODY MASS INDEX: 23.16 KG/M2 | OXYGEN SATURATION: 96 % | TEMPERATURE: 98 F | DIASTOLIC BLOOD PRESSURE: 87 MMHG | SYSTOLIC BLOOD PRESSURE: 156 MMHG | WEIGHT: 118 LBS | HEIGHT: 60 IN

## 2024-12-03 DIAGNOSIS — C50.919 MALIGNANT NEOPLASM OF FEMALE BREAST, UNSPECIFIED ESTROGEN RECEPTOR STATUS, UNSPECIFIED LATERALITY, UNSPECIFIED SITE OF BREAST: Primary | ICD-10-CM

## 2024-12-03 PROBLEM — S60.212A CONTUSION OF LEFT WRIST: Status: ACTIVE | Noted: 2024-11-12

## 2024-12-03 PROBLEM — M65.332 ACQUIRED TRIGGER FINGER OF LEFT MIDDLE FINGER: Status: ACTIVE | Noted: 2024-11-12

## 2024-12-03 LAB
ALBUMIN SERPL-MCNC: 4.4 G/DL (ref 3.5–5.2)
ALBUMIN/GLOB SERPL: 1.6 G/DL
ALP SERPL-CCNC: 84 U/L (ref 39–117)
ALT SERPL W P-5'-P-CCNC: 18 U/L (ref 1–33)
ANION GAP SERPL CALCULATED.3IONS-SCNC: 9.6 MMOL/L (ref 5–15)
AST SERPL-CCNC: 31 U/L (ref 1–32)
BASOPHILS # BLD AUTO: 0.02 10*3/MM3 (ref 0–0.2)
BASOPHILS NFR BLD AUTO: 0.7 % (ref 0–1.5)
BILIRUB SERPL-MCNC: 0.3 MG/DL (ref 0–1.2)
BUN SERPL-MCNC: 25 MG/DL (ref 8–23)
BUN/CREAT SERPL: 23.1 (ref 7–25)
CALCIUM SPEC-SCNC: 9.7 MG/DL (ref 8.6–10.5)
CHLORIDE SERPL-SCNC: 106 MMOL/L (ref 98–107)
CO2 SERPL-SCNC: 26.4 MMOL/L (ref 22–29)
CREAT SERPL-MCNC: 1.08 MG/DL (ref 0.57–1)
DEPRECATED RDW RBC AUTO: 53.5 FL (ref 37–54)
EGFRCR SERPLBLD CKD-EPI 2021: 58.2 ML/MIN/1.73
EOSINOPHIL # BLD AUTO: 0.06 10*3/MM3 (ref 0–0.4)
EOSINOPHIL NFR BLD AUTO: 2.2 % (ref 0.3–6.2)
ERYTHROCYTE [DISTWIDTH] IN BLOOD BY AUTOMATED COUNT: 15.5 % (ref 12.3–15.4)
GLOBULIN UR ELPH-MCNC: 2.8 GM/DL
GLUCOSE SERPL-MCNC: 110 MG/DL (ref 65–99)
HCT VFR BLD AUTO: 36.1 % (ref 34–46.6)
HGB BLD-MCNC: 11 G/DL (ref 12–15.9)
HOLD SPECIMEN: NORMAL
HOLD SPECIMEN: NORMAL
LYMPHOCYTES # BLD AUTO: 0.58 10*3/MM3 (ref 0.7–3.1)
LYMPHOCYTES NFR BLD AUTO: 20.9 % (ref 19.6–45.3)
MCH RBC QN AUTO: 30 PG (ref 26.6–33)
MCHC RBC AUTO-ENTMCNC: 30.5 G/DL (ref 31.5–35.7)
MCV RBC AUTO: 98.4 FL (ref 79–97)
MONOCYTES # BLD AUTO: 0.21 10*3/MM3 (ref 0.1–0.9)
MONOCYTES NFR BLD AUTO: 7.6 % (ref 5–12)
NEUTROPHILS NFR BLD AUTO: 1.9 10*3/MM3 (ref 1.7–7)
NEUTROPHILS NFR BLD AUTO: 68.6 % (ref 42.7–76)
PLATELET # BLD AUTO: 97 10*3/MM3 (ref 140–450)
PMV BLD AUTO: 10.6 FL (ref 6–12)
POTASSIUM SERPL-SCNC: 4.3 MMOL/L (ref 3.5–5.2)
PROT SERPL-MCNC: 7.2 G/DL (ref 6–8.5)
RBC # BLD AUTO: 3.67 10*6/MM3 (ref 3.77–5.28)
SODIUM SERPL-SCNC: 142 MMOL/L (ref 136–145)
WBC NRBC COR # BLD AUTO: 2.77 10*3/MM3 (ref 3.4–10.8)

## 2024-12-03 PROCEDURE — 85025 COMPLETE CBC W/AUTO DIFF WBC: CPT

## 2024-12-03 PROCEDURE — 36415 COLL VENOUS BLD VENIPUNCTURE: CPT

## 2024-12-03 PROCEDURE — 80053 COMPREHEN METABOLIC PANEL: CPT | Performed by: INTERNAL MEDICINE

## 2024-12-03 RX ORDER — LISINOPRIL 20 MG/1
20 TABLET ORAL DAILY
COMMUNITY
End: 2024-12-03

## 2024-12-03 RX ORDER — FLUTICASONE PROPIONATE 50 MCG
2 SPRAY, SUSPENSION (ML) NASAL
COMMUNITY

## 2024-12-03 NOTE — PROGRESS NOTES
Specialty Pharmacy Patient Management Program  Oncology Reassessment     Gladys Garrison was referred by an their provider to the Oncology Patient Management program offered by [facility] for metastatic ER/NM+ HER2- breast cancer. A follow-up outreach was conducted, including assessment of continued therapy appropriateness, medication adherence, and side effect incidence and management for Verzenio (abemaciclib).    Changes to Insurance Coverage or Financial Support  None reported    Relevant Past Medical History and Comorbidities  Relevant medical history and concomitant health conditions were discussed with the patient. The patient's chart has been reviewed for relevant past medical history and comorbid health conditions and updated as necessary.   Past Medical History:   Diagnosis Date    Allergic     Bone cancer     METASTATIC BONE; stage 4    Bradycardia     SECONDARY TO ABLATION    Breast cancer 2017    mets to lymph nodes; did not do radiation    Cancer of unknown origin     Compression fx, thoracic spine, open, initial encounter     Coronary artery disease     COVID-19 09/01/2021    Diabetes mellitus     Heart disease, unspecified     Hepatitis C     RESOLVED WITH MEDICATION    Hyperlipidemia     Hypertension     Obesity (BMI 30-39.9) 02/05/2021    Rib fracture     Per patient    Sleep apnea     no machine    Tachycardia     ATRIAL    Type 2 diabetes mellitus 11/2017     Social History     Socioeconomic History    Marital status: Legally     Number of children: 2   Tobacco Use    Smoking status: Never     Passive exposure: Never    Smokeless tobacco: Never   Vaping Use    Vaping status: Never Used   Substance and Sexual Activity    Alcohol use: Yes     Comment: drinks rarely    Drug use: Never    Sexual activity: Defer     Problem list reviewed by Verona Cowart RPH on 12/3/2024 at  4:31 PM    Hospitalizations and Urgent Care Since Last Assessment  ED Visits, Admissions, or  Hospitalizations: Yes, November 2024 for syncope and 8/2024 for rash  Urgent Office Visits: None reported    Allergies  Known allergies and reactions were discussed with the patient. The patient's chart has been reviewed for allergy information and updated as necessary.   Allergies   Allergen Reactions    Oxycodone Nausea And Vomiting    Promethazine Other (See Comments)     Hyperactive mean    Tape Other (See Comments)     .blisters      Adhesive Tape Rash    Flexeril [Cyclobenzaprine] Rash     Allergies reviewed by Verona Cowart RPH on 12/3/2024 at  4:31 PM    Relevant Laboratory Values  Relevant laboratory values were discussed with the patient. The following specialty medication dose adjustment(s) are recommended: Dose adjustments are needed for the oral specialty medication(s) based on continued neutropenia and thrombocytopenia.  The dose will be changed to 100 mg BID. However, pt is reluctant to dose change as she does not believe a reduced dose will be as efficacious. Discussed with pt that medication will work best taken consistently at reduced dose versus at standard/starting dose and holding frequently. Pt would like to discuss with provider first.    Lab Results   Component Value Date    GLUCOSE 85 11/02/2024    CALCIUM 9.4 11/02/2024     11/02/2024    K 4.1 11/02/2024    CO2 25.5 11/02/2024     (H) 11/02/2024    BUN 21 11/02/2024    CREATININE 1.07 (H) 11/02/2024    EGFRIFAFRI >60 10/12/2018    EGFRIFNONA 70 12/20/2021    BCR 19.6 11/02/2024    ANIONGAP 8.5 11/02/2024     Lab Results   Component Value Date    WBC 2.77 (L) 12/03/2024    RBC 3.67 (L) 12/03/2024    HGB 11.0 (L) 12/03/2024    HCT 36.1 12/03/2024    MCV 98.4 (H) 12/03/2024    MCH 30.0 12/03/2024    MCHC 30.5 (L) 12/03/2024    RDW 15.5 (H) 12/03/2024    RDWSD 53.5 12/03/2024    MPV 10.6 12/03/2024    PLT 97 (L) 12/03/2024    NEUTRORELPCT 68.6 12/03/2024    LYMPHORELPCT 20.9 12/03/2024    MONORELPCT 7.6 12/03/2024    EOSRELPCT 2.2  12/03/2024    BASORELPCT 0.7 12/03/2024    AUTOIGPER 0.8 (H) 08/24/2024    NEUTROABS 1.90 12/03/2024    LYMPHSABS 0.58 (L) 12/03/2024    MONOSABS 0.21 12/03/2024    EOSABS 0.06 12/03/2024    BASOSABS 0.02 12/03/2024    AUTOIGNUM 0.01 08/24/2024    NRBC 0.0 08/24/2024       Current Medication List  This medication list has been reviewed with the patient and evaluated for any interactions or necessary modifications/recommendations, and updated to include all prescription medications, OTC medications, and supplements the patient is currently taking.  This list reflects what is contained in the patient's profile, which has also been marked as reviewed to communicate to other providers it is the most up to date version of the patient's current medication therapy.     Current Outpatient Medications:     Abemaciclib (VERZENIO) 150 mg tablet chemo tablet, Take 1 tablet by mouth 2 (Two) Times a Day. Take with or without food; Swallow whole, do not crush, chew or split tablets., Disp: 60 tablet, Rfl: 5    Blood Glucose Monitoring Suppl (Accu-Chek Araceli) device, Use as instructed   To test blood sugar bid, Disp: 1 each, Rfl: 0    Calcium Carbonate-Vit D-Min (Calcium 600+D Plus Minerals) 600-400 MG-UNIT chewable tablet, Chew 600 mg 2 (Two) Times a Day., Disp: 60 each, Rfl: 6    Continuous Blood Gluc  (FreeStyle Rajeev 14 Day [place]) device, 1 each Daily., Disp: 1 each, Rfl: 0    Continuous Blood Gluc Sensor (FreeStyle Rajeev 14 Day Sensor) misc, 1 each Daily., Disp: 6 each, Rfl: 3    cyclobenzaprine (FLEXERIL) 10 MG tablet, Take 1 tablet by mouth 2 (Two) Times a Day As Needed for Muscle Spasms. (Patient not taking: Reported on 12/3/2024), Disp: 30 tablet, Rfl: 1    diphenoxylate-atropine (LOMOTIL) 2.5-0.025 MG per tablet, Take 1 tablet by mouth 3 (Three) Times a Day., Disp: 30 tablet, Rfl: 1    exemestane (AROMASIN) 25 MG tablet, Take 1 tablet by mouth Daily., Disp: , Rfl:     famotidine (PEPCID) 20 MG tablet, Take 1  "tablet by mouth 2 (Two) Times a Day As Needed for Heartburn., Disp: , Rfl:     fluticasone (FLONASE) 50 MCG/ACT nasal spray, Administer 2 sprays into the nostril(s) as directed by provider., Disp: , Rfl:     HYDROcodone-acetaminophen (NORCO)  MG per tablet, Take 1 tablet by mouth Every 4 (Four) Hours As Needed for Moderate Pain., Disp: 180 tablet, Rfl: 0    ibuprofen (ADVIL,MOTRIN) 800 MG tablet, Take 1 tablet by mouth Every 6 (Six) Hours As Needed for Mild Pain., Disp: 90 tablet, Rfl: 2    insulin NPH-insulin regular (humuLIN 70/30,novoLIN 70/30) (70-30) 100 UNIT/ML injection, Inject 5 Units under the skin into the appropriate area as directed Every Evening. If BS over 180, Disp: 15 mL, Rfl: 3    Insulin Pen Needle (B-D UF III MINI PEN NEEDLES) 31G X 5 MM misc, Use to inject insulin twice daily   DX: E11.9, Disp: 100 each, Rfl: 0    Insulin Syringe-Needle U-100 28G X 1/2\" 1 ML misc, 1 each 2 (Two) Times a Day., Disp: 100 each, Rfl: 6    losartan (Cozaar) 50 MG tablet, Take 1 tablet by mouth Daily. Discontinue the lisinopril due to interaction with new chemotherapy, Disp: 90 tablet, Rfl: 1    Morphine Sulfate, PF, 8 MG/ML solution, 8.5 mg by Intrathecal Infusion route Daily. Receives 8 mg through pain pump q 24 hrs, Disp: , Rfl:     Naloxegol Oxalate (Movantik) 12.5 MG tablet, Take 1 tablet by mouth Every Morning., Disp: 30 tablet, Rfl: 1    ondansetron ODT (ZOFRAN-ODT) 4 MG disintegrating tablet, Place 2 tablets on the tongue Every 8 (Eight) Hours As Needed for Nausea or Vomiting., Disp: 30 tablet, Rfl: 3    rosuvastatin (Crestor) 20 MG tablet, Take 1 tablet by mouth Every Night., Disp: 90 tablet, Rfl: 0    Medicines reviewed by Verona Cowart RPH on 12/3/2024 at  3:19 PM    Drug Interactions  Assessed medication list for interactions, no significant drug interactions noted.   Advised patient to call the clinic if any new medications are started so we can assess for drug-drug interactions.  Drug-food " interactions discussed: eating grapefruit and drinking grapefruit juice    Adverse Drug Reactions  Medication tolerability: Patient reported common adverse drug reaction  Medication plan: Continue therapy with closer monitoring  Plan for ADR Management: Pt has no issues with tolerability    Adherence, Self-Administration, and Current Therapy Problems  Adherence related to the patient's specialty therapy was discussed with the patient. The Adherence segment of this outreach has been reviewed and updated.     Adherence Questions  Linked Medication(s) Assessed: Abemaciclib (VERZENIO)  On average, how many doses/injections does the patient miss per month?: >10 (Held medication due to UTI. Did not want to restart dose reduction)  What are the identified reasons for non-adherence or missed doses? : other, lack of perceived benefit  List other reason(s) for non-adherence or missed doses: UTI and lack of perceived benefit from reduced dose  What is the estimated medication adherence level?: 70-79%  Based on the patient/caregiver response and refill history, does this patient require an MTP to track adherence improvements?: no (unsure if medication will change or pt will try reduced dose at this time. Will f/u with pt after MD appt to determine if adherence should be tracked more closely)    Additional Barriers to Patient Self-Administration: Frequent UTI's which pt states this is likely due to poor fluid intake as pt struggles with getting in enough fluids on certain work days. Discussed with pt that medication does lower WBC/ANC which does put pt at risk of infection which is why a reduced dose is encouraged given lab values from October/early November. Pt has been on off medication for about 4 weeks  Methods for Supporting Patient Self-Administration: Providing information on benefit of a dose reduction.  Patient has had no issues obtaining medication from pharmacy.    Open Medication Therapy Problems  Malignant neoplasm  of female breast   1 Current Medication: Abemaciclib (Verzenio) 150 MG tablet (Discontinued)   Current Medication Sig: Take  by mouth.   Rationale: Medication requires monitoring - Needs additional monitoring   Recommendation: Order Lab   Identified Date: 11/27/2023   Note: 11/27/23  Problem: low platelets and Covid infection  Communication: provider: Patient to hold Verzenio until labs are rechecked on 12/4/23. Contacted patient with directions to hold medication. She has been holding since 11/21/23 due to low platelets    Recommendation: Patient currently has grade 3 toxicity and per Uptodate, withhold abemaciclib until toxicity resolves to = grade 2 (no abemaciclib dosage reduction is necessary)   Follow-up: 1 week-delay due to Covid infection  Eda Her             Goals of Therapy  Goals related to the patient's specialty therapy were discussed with the patient. The Patient Goals segment of this outreach has been reviewed and updated.   Goals Addressed Today        Specialty Pharmacy General Goal      Clinical goal/therapeutic target: disease control, per the recent oncology clinic notes and labs.   Sept 2024: Per provider dictation, no signs of progression, pt to continue current treatment               Quality of Life Assessment   Quality of Life related to the patient's enrollment in the patient management program and services provided was discussed with the patient. The QOL segment of this outreach has been reviewed and updated.  Quality of Life Improvement Scale: 5-No change    Discussed aforementioned material with patient in person, face-to-face, in clinic.     Reassessment Plan & Follow-Up  1. Medication Therapy Changes: Discussed possibility of dose reduction and benefit as pt has been off medication for about 4 weeks due to UTIs and abnormal lab values. Pt would like to discuss with provider.  2. Related Plans, Therapy Recommendations, or Issues to Be Addressed: Discussed pt concerns with  provider. Provider to evaluate pt and will reach out to pharmacy regarding any changes.  3. Pharmacist to perform regular assessments no more than (6) months from the previous assessment.   4. Care Coordinator to set up future refill outreaches, coordinate prescription delivery, and escalate clinical questions to pharmacist.    Attestation  Therapeutic appropriateness: Appropriate   I attest the patient was actively involved in and has agreed to the above plan of care.  If the prescribed therapy is at any point deemed not appropriate based on the current or future assessments, a consultation will be initiated with the patient's specialty care provider to determine the best course of action. The revised plan of therapy will be documented along with any required assessments and/or additional patient education provided.     Verona Cowart, Pharm.D.[PHARMACIST NAME, CREDENTIALS]  Clinical Specialty Pharmacist, Oncology  12/3/2024  16:32 EST

## 2024-12-03 NOTE — PROGRESS NOTES
Hematology/Oncology Outpatient Follow Up    PATIENT NAME:Gladys Garrison  :1960  MRN: [id number]  PRIMARY CARE PHYSICIAN: Chirag Robles DO  REFERRING PHYSICIAN: Chirag Robles, *      Chief Complaint   Patient presents with    Follow-up     Malignant neoplasm of female breast, unspecified estrogen receptor status, unspecified laterality, unspecified site of breast            HISTORY OF PRESENT ILLNESS:     This is a 63-year-old female who multiple comorbidities including congenital abnormalities such as hypoplastic kidney, tetralogy of Fallot, coarctation of the aorta and congenital VSD status post repair.  Patient developed syncopal episode and for that reason she had she had a CT scan of the chest which showed mass in the left breast.  She had diagnostic mammogram and ultrasound which showed a 2 cm spiculated mass in the left breast at the 1 o'clock position 6 cm from the nipple.  Biopsy of the left breast mass revealed invasive moderately differentiated carcinoma ER/AK positive and HER-2/bishop negative.  Patient also had an ultrasound of the axilla which was concerning for an abnormal left axillary lymph node with cortical thickening. She apparently had an FNA of the left axillary nodule which was positive for malignancy.  On 2017 patient underwent left modified radical mastectomy, right prophylactic mastectomy and immediate breast reconstruction. She also underwent right prophylactic mastectomy.  We have had her on records suggest that patient did have multifocal disease with pT2 pN1 aM0.  The largest focus measured 3.5 cm.  2 of 11 lymph nodes were positive for metastatic disease some with extracapsular extension.  Notes that patient did receive Arimidex neoadjuvant  from 2017 to 2018 prior to her bilateral mastectomies.    Review of her note suggest that she developed cough which she attributed to anastrozole and patient was then placed on tamoxifen.  She had MammaPrint  testing which returned with low risk for relapse.    Her postop course was also complicated by left chest wall abscess which resulted in I&D and removal of the left tissue expander. She was referred for radiation treatment boards ultimately declined.    Patient was then placed on tamoxifen in April 2018 which she stopped after less than a month due to nausea and vomiting.  Patient in the interim also was diagnosed with chronic hepatitis C was seen by the hepatologist.  Tamoxifen was dose reduced to 10 mg daily with the goal to increase to 20 mg daily.      Patient has relocated to [facility].  She has transitioned her care to us now.  She is currently not on any antiestrogen therapy.     Her bilateral mastectomy specimen are available for review    1/29/2021 patient had bone density which showed osteoporosis  Patient was seen by neurosurgery and had a bone scan done in March 2021 which showed subtle activity in the inferior L4 vertebral body appears to correlate with new area of sclerosis on CT of the abdomen and pelvis.  There is also subtle activity with possible new lesion at the posterior left sacrum.  These findings were concerning for metastatic disease.  PET CT scan was recommended to further evaluate.  4/8/2021 patient had a PET CT scan which showed evidence of disease in the neck chest abdomen and pelvis.  But she has a 1.1 cm mixed lytic/sclerotic hypermetabolic lesion within the left hemisacrum consistent with metastatic disease.  There is also accumulation within the inferior endplate of the L4 vertebral body thought to correspond to 1.2 centimeters sclerotic lesion consistent with metastatic disease.  There is a tiny hypermetabolic focus within the L3, T11.  Suspicious for also early metastatic disease.  4/26/2021 patient underwent CT-guided biopsy of sacral lesion, pathology was consistent with metastatic breast cancer ER and IA positive HER-2/bishop was negative.  7/6/21 CT new sclerosis within the  right 12th rib posteriorly which could represent metastatic lesion or a healed or healing fracture, new sclerosis within the right 12th rib posteriorly which         could represent metastatic lesion,new sclerosis within the right T8 transverse process worrisome   for metastatic        disease progression.  7/7/21 complaints of 8/10 back pain and 8/10 LUQ pain. Referral to radiation for questionable mets on CT chest.  7/26/2021 patient had CT scan of the head with contrast with did not show any evidence of metastatic disease.  CT scan of the chest abdomen and pelvis did not show any evidence of progressive disease.  7/26/2021 patient had CEA level which shows declining values CA 15-3 has decreased to 62 from 84  11/3/2021: Patient had CT scan of the chest, abdomen and pelvis. In the chest there were no suspicious pulmonary nodules. There is no clear evidence of progressive disease  12/13/2021 patient had a bone scan which showed uptake along the posterior right ribs consistent with rib fractures.  There is mild uptake in the L4 vertebral body corresponding to the sclerotic lesion seen on previous CT scan.  This was likely due to metastatic disease  10/6/2022: Patient has been lost to follow-up.  She stopped Ibrance due to pulmonary issues.  Apparently patient went to the emergency room and was told that Ibrance was causing lung damage.  October 6, 2022: She has a increasing CA 15-3 and CA 27.29 as well as CEA level.  10/19/2022: Patient had guardant 360 testing done which was negative  10/31/2022: Patient had bone scan which basically showed evidence for mild progression of multifocal osseous metastatic disease.  There appears to be new activity corresponding to the thoracic cervical spine, left scapula, left sacrum and left proximal femur.  CT scan of the chest otherwise is stable.  There is a nonobstructing stone on the left kidney.        Past Medical History:   Diagnosis Date    Allergic     Bone cancer      METASTATIC BONE; stage 4    Bradycardia     SECONDARY TO ABLATION    Breast cancer     mets to lymph nodes; did not do radiation    Cancer of unknown origin     Compression fx, thoracic spine, open, initial encounter     Coronary artery disease     COVID-19 2021    Diabetes mellitus     Heart disease, unspecified     Hepatitis C     RESOLVED WITH MEDICATION    Hyperlipidemia     Hypertension     Obesity (BMI 30-39.9) 2021    Rib fracture     Per patient    Sleep apnea     no machine    Tachycardia     ATRIAL    Type 2 diabetes mellitus 2017       Past Surgical History:   Procedure Laterality Date    BACK SURGERY      neck X 2    BREAST RECONSTRUCTION Bilateral     CARDIAC ABLATION       atrial tachycardia x 5 ablations     CARDIAC CATHETERIZATION      CARDIAC ELECTROPHYSIOLOGY PROCEDURE N/A 2023    Procedure: Pacemaker RV lead insertion with generator change out. Verblingtr[employer];  Surgeon: Steven Hammond MD;  Location: [facility] CATH INVASIVE LOCATION;  Service: Cardiovascular;  Laterality: N/A;    CARDIAC SURGERY      stent placed in aorta    CARDIAC SURGERY      6 surgeries as baby     CERVICAL FUSION ANTERIOR WITH ARTIFICIAL DISCECTOMY IMPLANTATION N/A 2020    Procedure: C4 VERTEBRECTOMY AND ANTERIOR CERIVCAL DISCECTOMY WITH FUSION OF CERVICAL THREE THROUGH FIVE WITH REMOVAL OF HARDWARE C5-C6;  Surgeon: Mark Kaba MD;  Location: [facility] MAIN OR;  Service: Neurosurgery;  Laterality: N/A;     SECTION      x2    COLONOSCOPY N/A 10/23/2020    Procedure: COLONOSCOPY WITH POLYPECTOMY X6;  Surgeon: Stanley Bourne MD;  Location: [facility] ENDOSCOPY;  Service: Gastroenterology;  Laterality: N/A;  POLYPS, INTERNAL HEMORRHOIDS    ENDOMETRIAL ABLATION      REMOVAL SCAR TISSUE UTERINE    ENDOSCOPY N/A 10/23/2020    Procedure: ESOPHAGOGASTRODUODENOSCOPY WITH BIOPSY X 1 AREA;  Surgeon: Stanley Bourne MD;  Location: [facility] ENDOSCOPY;  Service: Gastroenterology;   Laterality: N/A;  GASTRITIS, ESOPHAGITIS, HIATAL HERNIA    INSERT / REPLACE / REMOVE PACEMAKER      KNEE SURGERY  2000    MASTECTOMY Bilateral     NECK SURGERY      PACEMAKER IMPLANTATION      PAIN PUMP INSERTION/REVISION N/A 04/05/2022    Procedure: PAIN PUMP INSERTION AND INTRATHECAL CATHETER PLACEMENT;  Surgeon: Chuck Hunter MD;  Location: [facility] MAIN OR;  Service: Pain Management;  Laterality: N/A;         Current Outpatient Medications:     Abemaciclib (VERZENIO) 150 mg tablet chemo tablet, Take 1 tablet by mouth 2 (Two) Times a Day. Take with or without food; Swallow whole, do not crush, chew or split tablets., Disp: 60 tablet, Rfl: 5    Blood Glucose Monitoring Suppl (Accu-Chek Araceli) device, Use as instructed   To test blood sugar bid, Disp: 1 each, Rfl: 0    Calcium Carbonate-Vit D-Min (Calcium 600+D Plus Minerals) 600-400 MG-UNIT chewable tablet, Chew 600 mg 2 (Two) Times a Day., Disp: 60 each, Rfl: 6    Continuous Blood Gluc  (FreeStyle Rajeev 14 Day Overton) device, 1 each Daily., Disp: 1 each, Rfl: 0    Continuous Blood Gluc Sensor (FreeStyle Rajeev 14 Day Sensor) misc, 1 each Daily., Disp: 6 each, Rfl: 3    cyclobenzaprine (FLEXERIL) 10 MG tablet, Take 1 tablet by mouth 2 (Two) Times a Day As Needed for Muscle Spasms. (Patient not taking: Reported on 12/3/2024), Disp: 30 tablet, Rfl: 1    diphenoxylate-atropine (LOMOTIL) 2.5-0.025 MG per tablet, Take 1 tablet by mouth 3 (Three) Times a Day., Disp: 30 tablet, Rfl: 1    exemestane (AROMASIN) 25 MG tablet, Take 1 tablet by mouth Daily., Disp: , Rfl:     famotidine (PEPCID) 20 MG tablet, Take 1 tablet by mouth 2 (Two) Times a Day As Needed for Heartburn., Disp: , Rfl:     fluticasone (FLONASE) 50 MCG/ACT nasal spray, Administer 2 sprays into the nostril(s) as directed by provider., Disp: , Rfl:     HYDROcodone-acetaminophen (NORCO)  MG per tablet, Take 1 tablet by mouth Every 4 (Four) Hours As Needed for Moderate Pain., Disp: 180 tablet,  "Rfl: 0    ibuprofen (ADVIL,MOTRIN) 800 MG tablet, Take 1 tablet by mouth Every 6 (Six) Hours As Needed for Mild Pain., Disp: 90 tablet, Rfl: 2    insulin NPH-insulin regular (humuLIN 70/30,novoLIN 70/30) (70-30) 100 UNIT/ML injection, Inject 5 Units under the skin into the appropriate area as directed Every Evening. If BS over 180, Disp: 15 mL, Rfl: 3    Insulin Pen Needle (B-D UF III MINI PEN NEEDLES) 31G X 5 MM misc, Use to inject insulin twice daily   DX: E11.9, Disp: 100 each, Rfl: 0    Insulin Syringe-Needle U-100 28G X 1/2\" 1 ML misc, 1 each 2 (Two) Times a Day., Disp: 100 each, Rfl: 6    losartan (Cozaar) 50 MG tablet, Take 1 tablet by mouth Daily. Discontinue the lisinopril due to interaction with new chemotherapy, Disp: 90 tablet, Rfl: 1    Morphine Sulfate, PF, 8 MG/ML solution, 8.5 mg by Intrathecal Infusion route Daily. Receives 8 mg through pain pump q 24 hrs, Disp: , Rfl:     Naloxegol Oxalate (Movantik) 12.5 MG tablet, Take 1 tablet by mouth Every Morning., Disp: 30 tablet, Rfl: 1    ondansetron ODT (ZOFRAN-ODT) 4 MG disintegrating tablet, Place 2 tablets on the tongue Every 8 (Eight) Hours As Needed for Nausea or Vomiting., Disp: 30 tablet, Rfl: 3    rosuvastatin (Crestor) 20 MG tablet, Take 1 tablet by mouth Every Night., Disp: 90 tablet, Rfl: 0    Allergies   Allergen Reactions    Oxycodone Nausea And Vomiting    Promethazine Other (See Comments)     Hyperactive mean    Tape Other (See Comments)     .blisters      Adhesive Tape Rash    Flexeril [Cyclobenzaprine] Rash       Family History   Problem Relation Age of Onset    Heart disease Mother     Stroke Mother     Lung cancer Mother     Aneurysm Father     Diabetes Sister     No Known Problems Brother     No Known Problems Brother     Diabetes type I Half-Sister     Thyroid cancer Half-Sister     Cancer Maternal Aunt     Heart attack Sister     Thyroid disease Sister     No Known Problems Sister        Cancer-related family history includes Cancer " in her maternal aunt; Lung cancer in her mother; Thyroid cancer in her half-sister.    Social History     Tobacco Use    Smoking status: Never     Passive exposure: Never    Smokeless tobacco: Never   Vaping Use    Vaping status: Never Used   Substance Use Topics    Alcohol use: Yes     Comment: drinks rarely    Drug use: Never       I have reviewed and confirmed the accuracy of the patient's history: Chief complaint, HPI, ROS, and Subjective as entered by the MA/LPN/RN. Маиря Nunez MD 12/03/24      SUBJECTIVE:      Patient denies any new issues.  Holding abnormality seen and Aromasin did not really change her symptoms.  She now has more pain medications    Has been off abemaciclib for the past 4 weeks      Monroeherman NOBLES Bladimir reports a pain score of 8.  Given her pain assessment as noted, treatment options were discussed and the following options were decided upon as a follow-up plan to address the patient's pain: continuation of current treatment plan for pain and referral to specialist for assistance in pain treatment guidance.       REVIEW OF SYSTEMS:     Review of Systems   Constitutional:  Negative for chills and fever.   HENT:  Negative for ear pain, mouth sores, nosebleeds and sore throat.    Eyes:  Negative for photophobia and visual disturbance.   Respiratory:  Negative for wheezing and stridor.    Cardiovascular:  Negative for chest pain and palpitations.   Gastrointestinal:  Negative for abdominal pain, diarrhea, nausea and vomiting.   Endocrine: Negative for cold intolerance and heat intolerance.   Genitourinary:  Negative for dysuria and hematuria.   Musculoskeletal:  Negative for joint swelling and neck stiffness.   Skin:  Negative for color change and rash.   Neurological:  Negative for seizures and syncope.   Hematological:  Negative for adenopathy.        No obvious bleeding   Psychiatric/Behavioral:  Negative for agitation, confusion and hallucinations.          OBJECTIVE:    Vitals:     "12/03/24 1444   BP: 156/87   Pulse: 60   Resp: 18   Temp: 98 °F (36.7 °C)   TempSrc: Infrared   SpO2: 96%   Weight: 53.5 kg (118 lb)   Height: 152.4 cm (60\")   PainSc:   8   PainLoc: Generalized                       Body mass index is 23.05 kg/m².    ECOG    (1) Restricted in physically strenuous activity, ambulatory and able to do work of light nature    Physical Exam  Vitals and nursing note reviewed.   Constitutional:       General: She is not in acute distress.     Appearance: Normal appearance. She is not diaphoretic.   HENT:      Head: Normocephalic and atraumatic.      Mouth/Throat:      Mouth: Mucous membranes are moist.      Pharynx: Oropharynx is clear.   Eyes:      General: No scleral icterus.        Right eye: No discharge.         Left eye: No discharge.      Extraocular Movements: Extraocular movements intact.      Conjunctiva/sclera: Conjunctivae normal.   Neck:      Thyroid: No thyromegaly.   Cardiovascular:      Rate and Rhythm: Normal rate and regular rhythm.      Pulses: Normal pulses.      Heart sounds: Normal heart sounds.      No friction rub. No gallop.   Pulmonary:      Effort: Pulmonary effort is normal. No respiratory distress.      Breath sounds: Normal breath sounds. No stridor. No wheezing.   Abdominal:      General: Bowel sounds are normal. There is distension.      Palpations: Abdomen is soft. There is no mass.      Tenderness: There is abdominal tenderness. There is guarding (Left upper abdomen). There is no rebound.   Musculoskeletal:         General: No tenderness. Normal range of motion.      Cervical back: Normal range of motion and neck supple.      Right lower leg: No edema.      Left lower leg: No edema.      Comments: Neck pain.  Patient has a neck collar.   Lymphadenopathy:      Cervical: No cervical adenopathy.   Skin:     General: Skin is warm and dry.      Capillary Refill: Capillary refill takes less than 2 seconds.      Findings: No bruising, erythema or rash. "   Neurological:      Mental Status: She is alert and oriented to person, place, and time.      Cranial Nerves: No cranial nerve deficit.      Sensory: No sensory deficit.      Motor: No abnormal muscle tone.   Psychiatric:         Mood and Affect: Mood normal.         Behavior: Behavior normal.         Thought Content: Thought content normal.         Judgment: Judgment normal.     No changes.  I have reexamined the patient and the results are consistent with the previously documented exam. Марияmiles Nunez MD      RECENT LABS    WBC   Date Value Ref Range Status   12/03/2024 2.77 (L) 3.40 - 10.80 10*3/mm3 Final     RBC   Date Value Ref Range Status   12/03/2024 3.67 (L) 3.77 - 5.28 10*6/mm3 Final     Hemoglobin   Date Value Ref Range Status   12/03/2024 11.0 (L) 12.0 - 15.9 g/dL Final     Hematocrit   Date Value Ref Range Status   12/03/2024 36.1 34.0 - 46.6 % Final     MCV   Date Value Ref Range Status   12/03/2024 98.4 (H) 79.0 - 97.0 fL Final     MCH   Date Value Ref Range Status   12/03/2024 30.0 26.6 - 33.0 pg Final     MCHC   Date Value Ref Range Status   12/03/2024 30.5 (L) 31.5 - 35.7 g/dL Final     RDW   Date Value Ref Range Status   12/03/2024 15.5 (H) 12.3 - 15.4 % Final     RDW-SD   Date Value Ref Range Status   12/03/2024 53.5 37.0 - 54.0 fl Final     MPV   Date Value Ref Range Status   12/03/2024 10.6 6.0 - 12.0 fL Final     Platelets   Date Value Ref Range Status   12/03/2024 97 (L) 140 - 450 10*3/mm3 Final     Neutrophil %   Date Value Ref Range Status   12/03/2024 68.6 42.7 - 76.0 % Final     Lymphocyte %   Date Value Ref Range Status   12/03/2024 20.9 19.6 - 45.3 % Final     Monocyte %   Date Value Ref Range Status   12/03/2024 7.6 5.0 - 12.0 % Final     Eosinophil %   Date Value Ref Range Status   12/03/2024 2.2 0.3 - 6.2 % Final     Basophil %   Date Value Ref Range Status   12/03/2024 0.7 0.0 - 1.5 % Final     Immature Grans %   Date Value Ref Range Status   08/24/2024 0.8 (H) 0.0 - 0.5 %  Final     Neutrophils, Absolute   Date Value Ref Range Status   12/03/2024 1.90 1.70 - 7.00 10*3/mm3 Final     Lymphocytes, Absolute   Date Value Ref Range Status   12/03/2024 0.58 (L) 0.70 - 3.10 10*3/mm3 Final     Monocytes, Absolute   Date Value Ref Range Status   12/03/2024 0.21 0.10 - 0.90 10*3/mm3 Final     Eosinophils, Absolute   Date Value Ref Range Status   12/03/2024 0.06 0.00 - 0.40 10*3/mm3 Final     Basophils, Absolute   Date Value Ref Range Status   12/03/2024 0.02 0.00 - 0.20 10*3/mm3 Final     Immature Grans, Absolute   Date Value Ref Range Status   08/24/2024 0.01 0.00 - 0.05 10*3/mm3 Final     nRBC   Date Value Ref Range Status   08/24/2024 0.0 0.0 - 0.2 /100 WBC Final       Lab Results   Component Value Date    GLUCOSE 85 11/02/2024    BUN 21 11/02/2024    CREATININE 1.07 (H) 11/02/2024    EGFRIFNONA 70 12/20/2021    EGFRIFAFRI >60 10/12/2018    BCR 19.6 11/02/2024    K 4.1 11/02/2024    CO2 25.5 11/02/2024    CALCIUM 9.4 11/02/2024    PROTENTOTREF 7.4 12/14/2020    ALBUMIN 4.0 10/28/2024    LABIL2 0.9 12/14/2020    AST 34 (H) 10/28/2024    ALT 16 10/28/2024       ASSESSMENT:    Metastatic breast cancer presenting as 1.1 cm mixed lytic/sclerotic hypermetabolic lesion in the left hemisacrum suspicious for metastatic disease 1.2 cm sclerotic lesion on L4, small L3 lesion and T11 lesion.  Tumor is ER positive, MO positive and HER-2/bishop negative.  Patient was prescribed combination of Ibrance and Arimidex.  Continued Ibrance due to pulmonary toxicity.  She was then placed on single agent Arimidex.  Upon progression on Arimidex was switched to Faslodex.  She developed L1 vertebral body progressive disease on Faslodex for that reason we will discontinue Faslodex and begin combination of Aromasin and Afinitor.  Patient took Afinitor for 3 days and discontinued due to nausea and fatigue.  [id number] does not show any actionable mutation.  She does not have any soft tissue for us to biopsy for additional  NGS testing.  Ongoing management  She is currently on Aromasin and abemaciclib stable.  Holding her meds did not change her symptoms.  Therefore we will go ahead and restart at this time.  Her CT scans completed August 2024 shows overall stable disease with no evidence of progression.  I have encouraged her to have the plain films of the right knee that was recommended by radiologist.  Reviewed  Abemaciclib induced diarrhea: Remittent Lomotil  Pancytopenia secondary to abemaciclib: Patient refuses dose reduction  Fatigue secondary to abemaciclib: This is stable  Thrombocytopenia due to abemaciclib.  CBC reviewed.  Platelets up to 80,000  Bone metastasis:.  Biphosphonate's has been recommended patient has not been able to have due to ongoing dental issues  Right foot swelling: Doppler study was negative  History of medical noncompliance  L1 vertebral body involvement.  Patient is established with neurosurgical team  Pain management: She will follow-up with pain management team  ypT2 N1 aM0 status post left modified radical mastectomy with lymph node dissection and right prophylactic mastectomy in 2017 performed at [facility].  ER positive, FL positive and HER-2/bishop negative.  Status post bilateral chest wall reconstruction.  According to patient, she tolerated Arimidex very well except that she also had osteoporosis therefore Arimidex was discontinued at that time  Intolerance of tamoxifen in the past  Osteoporosis: We will transition to Xgeva since she has bone metastases  Status post neoadjuvant Arimidex prior to bilateral mastectomies  Thrombocytopenia: Work-up was - December 2020  Neck pain status post cervical spine surgery: Resolved  Complex cardiac medical history including tetralogy of Fallot, coarctation of aorta, VSD status post repair.  Status post stent placement for coarctation of aorta  Personal history and strong family history of breast cancer in multiple in the relatives on  paternal side of the family including 4 paternal aunts in their 30s and 40s and 2 maternal aunts at age of 20s and 50s.  There is concern for possible hereditary breast cancer syndrome.  Patient may be  interested in pursuing cancer genetics for her management.  Assessment has been reviewed and updated          Discussion    Patient has metastatic breast cancer estrogen and progesterone dependent and HER-2/bishop negative.  She is currently on Arimidex.  We will add Ibrance.  We will also discontinue Prolia and begin Xgeva to help reduce skeletal events.           Plans:     Restart Aromasin and abemaciclib at current doses   consult to help with FMLA work restriction notes  Refer to Dr. Kothari to evaluate chest wall implant: Renewed referral  Continue supportive care antinausea medications  Reviewed imaging results with patient  Pancytopenia is due to Verzenio: Platelets up to 90,000  MTM pharmacist follow-up with her next week  Checked tumor markers for CEA CA 15-3 and CA 27.29 reviewed  She has completed  palliative XRT to her lumbar spine  Follow-up with Dr. Ruff with neurosurgical services  Guardant 360 for NGS testing was negative for any actionable mutations  Follow-up with pain management with Dr. Hunter  Guardian 360 does not show any actionable mutation.  Would consider soft tissue biopsy at time of progression for additional NGS testing.  Reviewed with patient  Referred to pulmonary for her dyspnea: Dr. Julio.  Appointment is pending  Follow-up with pain management for ongoing pain issues  Follow-up with /counselor   Reviewed work-up for thrombocytopenia which was negative  Reviewed her bone density which showed osteoporosis  Schedule Xgeva 120 mg subcu monthly once her dental procedures are completed.  She is still waiting on a ida  All questions answered  Follow-up 6 weeks  Labs reviewed            Patient verbalized understanding and is in agreement of the above  plan.           I spent 40 total minutes, face-to-face, caring for Gladys today. 90% of this time involved counseling and/or coordination of care as documented within this note.

## 2024-12-04 ENCOUNTER — OFFICE VISIT (OUTPATIENT)
Dept: PAIN MEDICINE | Facility: CLINIC | Age: 64
End: 2024-12-04
Payer: MEDICARE

## 2024-12-04 ENCOUNTER — DOCUMENTATION (OUTPATIENT)
Dept: ONCOLOGY | Facility: CLINIC | Age: 64
End: 2024-12-04
Payer: MEDICARE

## 2024-12-04 VITALS
DIASTOLIC BLOOD PRESSURE: 75 MMHG | OXYGEN SATURATION: 99 % | SYSTOLIC BLOOD PRESSURE: 144 MMHG | BODY MASS INDEX: 23.44 KG/M2 | WEIGHT: 120 LBS | HEART RATE: 61 BPM | RESPIRATION RATE: 16 BRPM

## 2024-12-04 DIAGNOSIS — C80.1 CANCER: Primary | ICD-10-CM

## 2024-12-04 DIAGNOSIS — Z51.5 PALLIATIVE CARE STATUS: ICD-10-CM

## 2024-12-04 RX ORDER — HYDROCODONE BITARTRATE AND ACETAMINOPHEN 10; 325 MG/1; MG/1
1 TABLET ORAL EVERY 4 HOURS PRN
Qty: 180 TABLET | Refills: 0 | Status: SHIPPED | OUTPATIENT
Start: 2024-12-04

## 2024-12-04 NOTE — PROGRESS NOTES
CHIEF COMPLAINT  Chief Complaint   Patient presents with    Follow-up     Hydrocodone LD 11-30-24  Pain pump    Back Pain    Hip Pain       Primary Care  Chirag Robles, DO    Subjective   Gladys Garrison is a 64 y.o. female  who presents for cancer-related pain.  She has a significant history of breast cancer which is unfortunately metastatic.  She states she has lesions on her ribs as well as in her low back.  She describes pain in her low back which radiates in her legs as well as pain in her chest and side from reported rib fractures.  She is very hesitant to take any narcotic pain medication because she does not want to become dependent on medication.  She also has significant concerns regarding escalating pain requirements and inability to meet the requirements that she starts on narcotic pain medication.  She states in the past, she had excellent results with steroid injections for her shoulder knee pain.  She was referred by her oncologist for further management and nonnarcotic options.    Pain  Associated symptoms include arthralgias, chest pain, myalgias and neck pain.   Primary Care Follow-UpAssociated symptoms include: chest pain, neck pain and myalgias.   Back Pain  Associated symptoms include chest pain.   Hip Pain          Location: Neck, back, ribs  Onset: Years ago  Duration: Gradually worsening  Timing: Throughout the day  Quality: Stabbing, aching  Severity: Today: 8       Last Week: 8       Worst: 9  Modifying Factors: The pain is fairly constant without any significant exacerbating or relieving factors    Physical Therapy: no    Interval Update 12/04/2024: Continues with rare hydrocodone for breakthrough.  Continue with intrathecal pain pump.  Unfortunately, she is having side effects from chemotherapy but is refusing to decrease the dose at this time.  Continues to receive refills from home health    The following portions of the patient's history were reviewed and updated as  appropriate: allergies, current medications, past family history, past medical history, past social history, past surgical history and problem list.      Current Outpatient Medications:     Abemaciclib (VERZENIO) 150 mg tablet chemo tablet, Take 1 tablet by mouth 2 (Two) Times a Day. Take with or without food; Swallow whole, do not crush, chew or split tablets., Disp: 60 tablet, Rfl: 5    Blood Glucose Monitoring Suppl (Accu-Chek Araceli) device, Use as instructed   To test blood sugar bid, Disp: 1 each, Rfl: 0    Calcium Carbonate-Vit D-Min (Calcium 600+D Plus Minerals) 600-400 MG-UNIT chewable tablet, Chew 600 mg 2 (Two) Times a Day., Disp: 60 each, Rfl: 6    Continuous Blood Gluc  (FreeStyle Rajeev 14 Day Walsh) device, 1 each Daily., Disp: 1 each, Rfl: 0    Continuous Blood Gluc Sensor (FreeStyle Rajeev 14 Day Sensor) misc, 1 each Daily., Disp: 6 each, Rfl: 3    cyclobenzaprine (FLEXERIL) 10 MG tablet, Take 1 tablet by mouth 2 (Two) Times a Day As Needed for Muscle Spasms., Disp: 30 tablet, Rfl: 1    diphenoxylate-atropine (LOMOTIL) 2.5-0.025 MG per tablet, Take 1 tablet by mouth 3 (Three) Times a Day., Disp: 30 tablet, Rfl: 1    exemestane (AROMASIN) 25 MG tablet, Take 1 tablet by mouth Daily., Disp: , Rfl:     famotidine (PEPCID) 20 MG tablet, Take 1 tablet by mouth 2 (Two) Times a Day As Needed for Heartburn., Disp: , Rfl:     fluticasone (FLONASE) 50 MCG/ACT nasal spray, Administer 2 sprays into the nostril(s) as directed by provider., Disp: , Rfl:     HYDROcodone-acetaminophen (NORCO)  MG per tablet, Take 1 tablet by mouth Every 4 (Four) Hours As Needed for Moderate Pain., Disp: 180 tablet, Rfl: 0    ibuprofen (ADVIL,MOTRIN) 800 MG tablet, Take 1 tablet by mouth Every 6 (Six) Hours As Needed for Mild Pain., Disp: 90 tablet, Rfl: 2    insulin NPH-insulin regular (humuLIN 70/30,novoLIN 70/30) (70-30) 100 UNIT/ML injection, Inject 5 Units under the skin into the appropriate area as directed Every  "Evening. If BS over 180, Disp: 15 mL, Rfl: 3    Insulin Pen Needle (B-D UF III MINI PEN NEEDLES) 31G X 5 MM misc, Use to inject insulin twice daily   DX: E11.9, Disp: 100 each, Rfl: 0    Insulin Syringe-Needle U-100 28G X 1/2\" 1 ML misc, 1 each 2 (Two) Times a Day., Disp: 100 each, Rfl: 6    losartan (Cozaar) 50 MG tablet, Take 1 tablet by mouth Daily. Discontinue the lisinopril due to interaction with new chemotherapy, Disp: 90 tablet, Rfl: 1    Morphine Sulfate, PF, 8 MG/ML solution, 8.5 mg by Intrathecal Infusion route Daily. Receives 8 mg through pain pump q 24 hrs, Disp: , Rfl:     Naloxegol Oxalate (Movantik) 12.5 MG tablet, Take 1 tablet by mouth Every Morning., Disp: 30 tablet, Rfl: 1    ondansetron ODT (ZOFRAN-ODT) 4 MG disintegrating tablet, Place 2 tablets on the tongue Every 8 (Eight) Hours As Needed for Nausea or Vomiting., Disp: 30 tablet, Rfl: 3    rosuvastatin (Crestor) 20 MG tablet, Take 1 tablet by mouth Every Night., Disp: 90 tablet, Rfl: 0    Review of Systems   Cardiovascular:  Positive for chest pain.   Genitourinary:  Positive for flank pain.   Musculoskeletal:  Positive for arthralgias, back pain, gait problem, myalgias and neck pain.       Vitals:    12/04/24 1516   BP: 144/75   Pulse: 61   Resp: 16   SpO2: 99%   Weight: 54.4 kg (120 lb)   PainSc: 10-Worst pain ever       Objective   Physical Exam  Vitals and nursing note reviewed.   Constitutional:       General: She is not in acute distress.     Appearance: Normal appearance. She is obese.   Musculoskeletal:         General: Normal range of motion.      Comments: Pump incisions clean, dry, intact.  Completely healed   Neurological:      General: No focal deficit present.      Mental Status: She is alert.      Sensory: No sensory deficit.      Motor: No weakness.           Assessment & Plan   Problems Addressed this Visit    None  Visit Diagnoses       Cancer    -  Primary    Palliative care status        Relevant Medications    " HYDROcodone-acetaminophen (NORCO)  MG per tablet          Diagnoses         Codes Comments    Cancer    -  Primary ICD-10-CM: C80.1  ICD-9-CM: 199.1     Palliative care status     ICD-10-CM: Z51.5  ICD-9-CM: V66.7             Plan:  Continue with intrathecal pump.  She is currently being managed by a home health agency  Continue with hydrocodone for breakthrough pain  Refill Movantik for OIC  Continue with oncology for metastatic breast cancer  --- Follow-up 3 months             INSPECT REPORT    As part of the patient's treatment plan, I may be prescribing controlled substances. The patient has been made aware of appropriate use of such medications, including potential risk of somnolence, limited ability to drive and/or work safely, and the potential for dependence or overdose. It has also bee made clear that these medications are for use by this patient only, without concomitant use of alcohol or other substances unless prescribed.     Patient has completed prescribing agreement detailing terms of continued prescribing of controlled substances, including monitoring JULIA reports, urine drug screening, and pill counts if necessary. The patient is aware that inappropriate use will results in cessation of prescribing such medications.    INSPECT report has been reviewed and scanned into the patient's chart.    As the clinician, I personally reviewed the INSPECT from 12/3/2024.    History and physical exam exhibit continued safe and appropriate use of controlled substances.      EMR Dragon/Transcription disclaimer:   Much of this encounter note is an electronic transcription/translation of spoken language to printed text. The electronic translation of spoken language may permit erroneous, or at times, nonsensical words or phrases to be inadvertently transcribed; Although I have reviewed the note for such errors, some may still exist.

## 2024-12-04 NOTE — PROGRESS NOTES
OSW was asked to see pt to assist with FMLA and work restriction letter.  OSW met with pt who states that she does not need help with FMLA at this time.  She does need a work restriction letter.  OSW is not aware of what restrictions there need to be.  This was communicated to Dr. Nunez's staff for f/up.

## 2024-12-04 NOTE — PROGRESS NOTES
Specialty Pharmacy Note: Verzenio (abemaciclib)     Gladys Garrison is a 64 y.o. female with metastatic breast cancer was seen 12/3/24 by Dr. Nunez. Discussed dose reduction with pt and provider. Per provider dictation, pt refused dose reduction. Pt to continue abemaciclib at current dose, 150 mg twice daily.  Labs Review: The CBC and CMP from 12/24/24 have been reviewed. No dose adjustments are needed for the oral specialty medication(s) based on the labs; however, pt had recently restarted abemaciclib 150 mg BID a few days prior to appt/lab appt. Discussed with pt at appt risk of continuing current dose of 150 mg BID on her WBC, ANC, and platelets. Pt verbalized understanding and refused dose reduction.     Specialty pharmacy will continue to follow patient.    Thank you,  Verona Cowart, Pharm.D.

## 2024-12-05 ENCOUNTER — TELEPHONE (OUTPATIENT)
Dept: FAMILY MEDICINE CLINIC | Facility: CLINIC | Age: 64
End: 2024-12-05
Payer: MEDICARE

## 2024-12-05 DIAGNOSIS — R30.0 DYSURIA: Primary | ICD-10-CM

## 2024-12-05 NOTE — TELEPHONE ENCOUNTER
Caller: Gladys Garrison    Relationship: Self    Best call back number: [phone]    What medication are you requesting: ANTIBIOTIC    What are your current symptoms: FREQUENCY, PAINFUL URINATION, SWOLLEN STOMACH, URGENCY    How long have you been experiencing symptoms: 1 DAY    Have you had these symptoms before:    [x] Yes  [] No    Have you been treated for these symptoms before:   [x] Yes  [] No    If a prescription is needed, what is your preferred pharmacy and phone number: [address] [phone] [place] [phone]      Additional notes:  PLEASE CALL TO CONFIRM OR DENY.     STATES SHE CAN'T MISS WORK TO COME IN FOR OFFICE VISIT.

## 2024-12-06 RX ORDER — NITROFURANTOIN 25; 75 MG/1; MG/1
100 CAPSULE ORAL 2 TIMES DAILY
Qty: 10 CAPSULE | Refills: 0 | Status: SHIPPED | OUTPATIENT
Start: 2024-12-06 | End: 2024-12-11

## 2024-12-10 ENCOUNTER — SPECIALTY PHARMACY (OUTPATIENT)
Dept: PHARMACY | Facility: HOSPITAL | Age: 64
End: 2024-12-10
Payer: MEDICARE

## 2024-12-10 NOTE — PROGRESS NOTES
Specialty Pharmacy Note: Verzenio (abemaciclib)    Spoke to pt via phone. She reports another UTI since restarting Verzenio. She is currently holding medication while on antibiotic and UTI clears up. She requested a dose reduction due to repeated UTIs. Message sent to Dr. Nunez to confirm dose reduction. Pt agreeable now to dose reduction, but states that if she continues to get UTIs with dose reduction, she would like to consider alternate therapy.       Thank you,  Verona Cowart, Pharm.D.

## 2024-12-13 ENCOUNTER — SPECIALTY PHARMACY (OUTPATIENT)
Dept: PHARMACY | Facility: HOSPITAL | Age: 64
End: 2024-12-13
Payer: MEDICARE

## 2024-12-13 NOTE — PROGRESS NOTES
Re: Refills of Oral Specialty Medication - Verzenio (abemaciclib)    Drug-Drug Interactions: The current medication list was reviewed and there are no relevant drug-drug interactions with the specialty medication.  Medication Allergies: The patient has no relevant allergies as it relates to their oral specialty medication  Review of Labs/Dose Adjustments: DOSE CHANGE - I reviewed the most recent note and labs. Due to recurrent UTIs and thrombocytopenia the dose is being decreased per DR. Nunez. I sent refills as described below.    Drug: Verzenio (abemaciclib)  Strength: 100 mg  Directions: Take 1 tablet by mouth twice a day  Quantity: 60  Refills: 5  Pharmacy prescription sent to: [facility] upon MD signature    Name/Credentials  Eda HIRSCH PharmD      12/13/2024  12:01 EST

## 2024-12-16 ENCOUNTER — SPECIALTY PHARMACY (OUTPATIENT)
Dept: PHARMACY | Facility: HOSPITAL | Age: 64
End: 2024-12-16
Payer: MEDICARE

## 2024-12-16 NOTE — PROGRESS NOTES
Specialty Pharmacy Note    Called insurance plan [phone] to renew prior authorization for verzenio and was told authorization was needed any more. [facility] patient assistance application was approved 12/16/24 until the end of the 2025 calendar year.    Gregorio Grimm - CARE COORDINATOR  12/16/2024  13:53 EST

## 2025-01-03 ENCOUNTER — SPECIALTY PHARMACY (OUTPATIENT)
Dept: PHARMACY | Facility: HOSPITAL | Age: 65
End: 2025-01-03
Payer: MEDICARE

## 2025-01-03 ENCOUNTER — OFFICE VISIT (OUTPATIENT)
Dept: FAMILY MEDICINE CLINIC | Facility: CLINIC | Age: 65
End: 2025-01-03
Payer: MEDICARE

## 2025-01-03 ENCOUNTER — LAB (OUTPATIENT)
Dept: FAMILY MEDICINE CLINIC | Facility: CLINIC | Age: 65
End: 2025-01-03
Payer: MEDICARE

## 2025-01-03 VITALS
WEIGHT: 119 LBS | SYSTOLIC BLOOD PRESSURE: 144 MMHG | RESPIRATION RATE: 20 BRPM | OXYGEN SATURATION: 97 % | BODY MASS INDEX: 23.36 KG/M2 | HEIGHT: 60 IN | TEMPERATURE: 99.1 F | HEART RATE: 88 BPM | DIASTOLIC BLOOD PRESSURE: 62 MMHG

## 2025-01-03 DIAGNOSIS — R23.3 PETECHIAL RASH: ICD-10-CM

## 2025-01-03 DIAGNOSIS — D84.9 IMMUNODEFICIENCY, UNSPECIFIED: ICD-10-CM

## 2025-01-03 DIAGNOSIS — R11.10 VOMITING AND DIARRHEA: Primary | ICD-10-CM

## 2025-01-03 DIAGNOSIS — R19.7 VOMITING AND DIARRHEA: Primary | ICD-10-CM

## 2025-01-03 LAB
CRP SERPL-MCNC: 7.06 MG/DL (ref 0–0.5)
ERYTHROCYTE [SEDIMENTATION RATE] IN BLOOD: 49 MM/HR (ref 0–30)

## 2025-01-03 PROCEDURE — 3078F DIAST BP <80 MM HG: CPT | Performed by: PHYSICIAN ASSISTANT

## 2025-01-03 PROCEDURE — 1125F AMNT PAIN NOTED PAIN PRSNT: CPT | Performed by: PHYSICIAN ASSISTANT

## 2025-01-03 PROCEDURE — 86140 C-REACTIVE PROTEIN: CPT | Performed by: INTERNAL MEDICINE

## 2025-01-03 PROCEDURE — 1159F MED LIST DOCD IN RCRD: CPT | Performed by: PHYSICIAN ASSISTANT

## 2025-01-03 PROCEDURE — 99213 OFFICE O/P EST LOW 20 MIN: CPT | Performed by: PHYSICIAN ASSISTANT

## 2025-01-03 PROCEDURE — 3077F SYST BP >= 140 MM HG: CPT | Performed by: PHYSICIAN ASSISTANT

## 2025-01-03 PROCEDURE — 36415 COLL VENOUS BLD VENIPUNCTURE: CPT

## 2025-01-03 PROCEDURE — 1160F RVW MEDS BY RX/DR IN RCRD: CPT | Performed by: PHYSICIAN ASSISTANT

## 2025-01-03 PROCEDURE — 85652 RBC SED RATE AUTOMATED: CPT | Performed by: INTERNAL MEDICINE

## 2025-01-03 NOTE — PROGRESS NOTES
"Chief Complaint  Chief Complaint   Patient presents with    Vomiting    Diarrhea       Subjective        Gladys Garrison is a 64 y.o. female who presents to [facility].  History of Present Illness  Presents today for concerns of vomiting and diarrhea since Tuesday.  She does have metastatic breast cancer estrogen and progesterone dependent HER 2/bishop negative, breast cancer has metastasized to bone.   She is on Arimidex, Dr. Nunez added Ibrance.  She is not receiving chemotherapy or radiation.    She began feeling ill and started vomiting on Tuesday.  Diarrhea began this morning.  Not able to eat, she can tolerate small sips of liquids.   Denies blood in her stool, but states her stools are \"very black\" and look like clots.  States both the liquid and clots are very black.  First episode of diarrhea had black clots, now it is black liquid.  Denies stool looking like coffee grounds.  Has had about 4 episodes of diarrhea total, first was large in volume, now they are small in volume.   Vomiting clear liquid, vomting about 5 times per day.   Reports feeling as if she has a fever, but has not taken her temperature with a thermometer.   Reports abdominal pain.  Works at [employer], unsure if she has come in contact with someone ill there.   Denies taking any medications as when she tried taking Tylenol, she vomited.     Objective   /62 (BP Location: Right arm)   Pulse 88   Temp 99.1 °F (37.3 °C) (Oral)   Resp 20   Ht 152.4 cm (60\")   Wt 54 kg (119 lb)   SpO2 97%   BMI 23.24 kg/m²     Estimated body mass index is 23.24 kg/m² as calculated from the following:    Height as of this encounter: 152.4 cm (60\").    Weight as of this encounter: 54 kg (119 lb).     Physical Exam   GEN: Ill-appearing  CV: Regular rate and rhythm, no murmurs, 2+ peripheral pulses, No extremity edema.   RESP: Lungs clear to auscultation anteriorly and posteriorly in all lung fields bilaterally.  ABD: Soft, " tender, nondistended, hypoactive bowel sounds.  PSYCH: Affect normal, insight fair      Result Review :              Assessment and Plan     Assessment & Plan  Vomiting and diarrhea  Discussed as her diarrhea has had black clots and black liquids, encouraged her to proceed to the ER for further evaluation to ensure she does not have a GI bleed and for IV fluid replacement, she declines.  Encouraged her to stay well-hydrated by taking small sips of fluids such as water, ginger ale, or broth.  Discussed she can take Pepto-Bismol as needed.  She does have Zofran, discussed she can take this as needed as well.  Stool culture has been ordered as she is on Ibrance for metastatic breast cancer and this can cause immunosuppression.  Orders:    Gastrointestinal Panel, PCR - Stool, Per Rectum; Future            Follow Up     No follow-ups on file.

## 2025-01-03 NOTE — PROGRESS NOTES
Specialty Pharmacy Note    Darlinea Abemaciclib (Verzenio) 100mg (60 tabs) prior authorization was done today thru covermymeds.  Plan Name: RX MEDD [employer] PDP AND MAPD PLANS   Key: [id number]  Authorization Denied or Approved:approved  Authorization approved from 1/3/24 to 12/31/25.  Dispensing pharmacy is  [facility] thru [facility]  Test bill is $2,000 for verzenio 100mg 60 tabs/30-day supply. [facility] are not currently open, patient gets free drug thru [facility].    Gregorio Grimm, Pharmacy Technician  1/3/2025  16:26 EST

## 2025-01-03 NOTE — Clinical Note
January 3, 2025     Patient: Gladys Garrison   YOB: 1960   Date of Visit: 1/3/2025       To Whom It May Concern:    It is my medical opinion that Gladys Garrison may return to work in one day.            Sincerely,        Johanna Leroy PA-C    CC: No Recipients

## 2025-01-03 NOTE — LETTER
January 3, 2025     Patient: Gladys Garrison   YOB: 1960   Date of Visit: 1/3/2025       To Whom It May Concern:    It is my medical opinion that Gladys Garrison may return to work in three days.            Sincerely,        Johanna Leroy PA-C    CC: No Recipients

## 2025-01-05 ENCOUNTER — HOSPITAL ENCOUNTER (INPATIENT)
Facility: HOSPITAL | Age: 65
LOS: 7 days | Discharge: HOME | DRG: 314 | End: 2025-01-13
Attending: EMERGENCY MEDICINE | Admitting: STUDENT IN AN ORGANIZED HEALTH CARE EDUCATION/TRAINING PROGRAM
Payer: MEDICARE

## 2025-01-05 DIAGNOSIS — C50.919 MALIGNANT NEOPLASM OF FEMALE BREAST, UNSPECIFIED ESTROGEN RECEPTOR STATUS, UNSPECIFIED LATERALITY, UNSPECIFIED SITE OF BREAST: ICD-10-CM

## 2025-01-05 DIAGNOSIS — S13.9XXA NECK SPRAIN, INITIAL ENCOUNTER: ICD-10-CM

## 2025-01-05 DIAGNOSIS — R19.7 DIARRHEA OF PRESUMED INFECTIOUS ORIGIN: ICD-10-CM

## 2025-01-05 DIAGNOSIS — C79.51 MALIGNANT NEOPLASM METASTATIC TO BONE: ICD-10-CM

## 2025-01-05 DIAGNOSIS — N17.9 AKI (ACUTE KIDNEY INJURY): ICD-10-CM

## 2025-01-05 DIAGNOSIS — Z51.5 PALLIATIVE CARE STATUS: ICD-10-CM

## 2025-01-05 DIAGNOSIS — S09.90XA INJURY OF HEAD, INITIAL ENCOUNTER: ICD-10-CM

## 2025-01-05 DIAGNOSIS — S00.83XA CONTUSION OF FACE, INITIAL ENCOUNTER: ICD-10-CM

## 2025-01-05 DIAGNOSIS — A41.9 SEPSIS, DUE TO UNSPECIFIED ORGANISM, UNSPECIFIED WHETHER ACUTE ORGAN DYSFUNCTION PRESENT: Primary | ICD-10-CM

## 2025-01-05 DIAGNOSIS — R10.9 ACUTE ABDOMINAL PAIN: ICD-10-CM

## 2025-01-05 PROCEDURE — 99285 EMERGENCY DEPT VISIT HI MDM: CPT

## 2025-01-05 NOTE — LETTER
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  S/p L ICA terminus occulusion a/p L ICA thrombectomy,  w/ underling L petrous ICA dissection     # L MCA stroke w/ hemorrhagic conversion  # L petrous ICA dissection w/ pesudoaneurysm  - CTH 11/9: evolving acute to subacute left MCA infarct with evolving left basal ganglia hemorrhagic transformation, new punctuate foci of increased hyperdensity. Stable mass effect and 5mm rightward midline shift  - Aspirin 325mg daily  - Plavix 75mg daily   - Rosuvastatin 5mg QD  - outpatient cardiology f/u for w/u r/o cardioembolic source  - continue comprehensive rehab program, 3 hours a day, 5 days a week.    # R Soleal Vein DVT  - Duplex U/S showed DVT below the knee, R soleal vein thrombosis 11/14  - Lovenox 40 mg SubQ daily 11/15  - f/u weekly dopplers, last doppler 11/21 shows Persistent right soleal vein DVT.        #Urinary Retention, unimproved  -continue bladder scans  -UA without gross contamination, but per hospitalist will start cipro 11/18 empirically and send urine culture.  UCx resulted with cipro resistance, so macrobid started. Last day 11/27.   -bowels reg.   -flomax 0.4 initiated daily 11/18  -no void throughout this week-- has been requiring SC   --jerome Catheter ordered  --d/w hospitalist  --Hospitalist spoke with pt's wife regarding need for jerome  --Consider TOV next week.     UTI--  -- u/a +  -- on cipro, discontinued 11/20  - 11/20 started macrobid due to sensitivities. Last day 11/27  -- urine Cx +Enteroccocus  --may likely be contributing to  retention      # Normocytic anemia (stable) vs Iron deficiency anemia   - F/u outpatient colonoscopy for hx of colon polps  - Transfuse <7 PRN    - Iron studies: Transferrin: 11, TIBC: 170, Iron: 18, Ferritin: 414  - monitor H/H. Hgb 12.0 11/18 stable  - CBC 11/21--Hb stable 11.5    # RHETT  # Activity-Induced Hypoxia  - CXR- negative   - CPAP HS 5 50% O2. Poor compliance  - O2 via NC PRN  - Albuterol PRN    # Gout  - Colchicine 0.6mg daily  - Uric acid 11/12- 6.8     # Mood/Cognition  # Depression  - Paxil 30mg daily     # Sleep:   -cont. melatonin 9 qhs     # Pain Management:  - Tylenol PRN    # GI/Bowel:  - Senna QHS  - Miralax BIDD  - Bisacodyl suppository QD PRN  - simethicone for gas pain  - GI ppx: Pantoprazole 40mg daily   - constipation with abdominal discomfort: will give dulcolax suppository. If persists, AXR  - per SLP, will initiate free water protocol   - continue with aggressive bowel regimen to assist with urinary retention    # Dysphagia:  - S/p MBS on 11/12--> diet: Easy to chew; mild thick liquids- DASH  - 1:1 feeds    # LABS  CBC CMP 11/25   monitor BM    Letter for cruise ship cancellation due to patient's injury.     IDT 11/19  Omero RASMUSSEN - incontinent of bowel, requiring SC  SW - condo with wife, 3-4 arely. elevator to 7th floor.  Independent prior, driving and working.  Wife is retired, main support and she drives.  SLP - easy to chew with mildly thick liquids.  Severe apraxia, aphasia, dysarthria. He can approximate single words with multiple cues.  He has picture board for AAC.  On free water protocol.  Repeat MBS end of this week or beginning of next week.  Suspect cog deficits but difficult to determine due to apraxia and dysarthria, and aphasia.  OT - supervision with eating, jose l for oral hygiene, maxA for bathing, UBD, and totA for toileting, LBD, commode transfer.  No shower transfer yet.  Today, mod2 for squat pivot, no active movement UE.  Therapy requesting max time.  Goals are jose l at WC level.  PT - bed mob maxA; transfer totA; non ambulatory.  Goal is Jose L  Goal 1 - use multimodalities to communicate basic wants and needs with staff max cues.     Goal 2 - transfer OOB with minimal assist  barriers - communication, hemiplegia, sensory impairment, aphasia, poor endurance.    admit GC 11/13.    CMG is 12/5.  DC 12/5 to [facility]            ---------------  Code Status: FULL  Emergency Contact:    Outpatient Follow-up (Specialty/Name of physician):  Dr. 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  S/p L ICA terminus occulusion a/p L ICA thrombectomy,  w/ underling L petrous ICA dissection     # L MCA stroke w/ hemorrhagic conversion  # L petrous ICA dissection w/ pesudoaneurysm  - CTH 11/9: evolving acute to subacute left MCA infarct with evolving left basal ganglia hemorrhagic transformation, new punctuate foci of increased hyperdensity. Stable mass effect and 5mm rightward midline shift  - Aspirin 325mg daily  - Plavix 75mg daily   - Rosuvastatin 5mg QD  - outpatient cardiology f/u for w/u r/o cardioembolic source  - continue comprehensive rehab program, 3 hours a day, 5 days a week.    HTN---Orthostatic Hypotension  --Stopped Amlodipine  --decreased losartan to 25mg daily  --Monitor BP  --check orthostatics.     # R Soleal Vein DVT  - Duplex U/S showed DVT below the knee, R soleal vein thrombosis 11/14  - Lovenox 40 mg SubQ daily 11/15  - f/u weekly dopplers, last doppler 11/21 shows Persistent right soleal vein DVT.        #Urinary Retention, unimproved  -continue bladder scans  -UA without gross contamination, but per hospitalist will start cipro 11/18 empirically and send urine culture.  UCx resulted with cipro resistance, so macrobid started. Last day 11/27.   -bowels reg.   -flomax 0.4 initiated daily 11/18  -no void throughout this week-- has been requiring SC   --jerome Catheter ordered  --d/w hospitalist  --Hospitalist spoke with pt's wife regarding need for jerome  --Consider TOV next week.     UTI--  -- u/a +  -- on cipro, discontinued 11/20  - 11/20 started macrobid due to sensitivities. Last day 11/27  -- urine Cx +Enteroccocus  --may likely be contributing to  retention      # Normocytic anemia (stable) vs Iron deficiency anemia   - F/u outpatient colonoscopy for hx of colon polps  - Transfuse <7 PRN    - Iron studies: Transferrin: 11, TIBC: 170, Iron: 18, Ferritin: 414  - monitor H/H. Hgb 12.0 11/18 stable  - CBC 11/21--Hb stable 11.5    # RHETT  # Activity-Induced Hypoxia  - CXR- negative   - CPAP HS 5 50% O2. Poor compliance  - O2 via NC PRN  - Albuterol PRN    # Gout  - Colchicine 0.6mg daily  - Uric acid 11/12- 6.8     # Mood/Cognition  # Depression  - Paxil 30mg daily     # Sleep:   -cont. melatonin 9 qhs     # Pain Management:  - Tylenol PRN    # GI/Bowel:  - Senna QHS  - Miralax BIDD  - Bisacodyl suppository QD PRN  - simethicone for gas pain  - GI ppx: Pantoprazole 40mg daily   - constipation with abdominal discomfort: will give dulcolax suppository. If persists, AXR  - per SLP, will initiate free water protocol   - continue with aggressive bowel regimen to assist with urinary retention    # Dysphagia:  - S/p MBS on 11/12--> diet: Easy to chew; mild thick liquids- DASH  - 1:1 feeds    # LABS  CBC CMP 11/25   monitor BM    Letter for cruise ship cancellation due to patient's injury.     IDT 11/19  Omero  RN - incontinent of bowel, requiring SC  SW - condo with wife, 3-4 arely. elevator to 7th floor.  Independent prior, driving and working.  Wife is retired, main support and she drives.  SLP - easy to chew with mildly thick liquids.  Severe apraxia, aphasia, dysarthria. He can approximate single words with multiple cues.  He has picture board for AAC.  On free water protocol.  Repeat MBS end of this week or beginning of next week.  Suspect cog deficits but difficult to determine due to apraxia and dysarthria, and aphasia.  OT - supervision with eating, jose l for oral hygiene, maxA for bathing, UBD, and totA for toileting, LBD, commode transfer.  No shower transfer yet.  Today, mod2 for squat pivot, no active movement UE.  Therapy requesting max time.  Goals are jose l at WC level.  PT - bed mob maxA; transfer totA; non ambulatory.  Goal is Jose L  Goal 1 - use multimodalities to communicate basic wants and needs with staff max cues.     Goal 2 - transfer OOB with minimal assist  barriers - communication, hemiplegia, sensory impairment, aphasia, poor endurance.    admit GC 11/13.    CMG is 12/5.  DC 12/5 to [facility]            ---------------  Code Status: FULL  Emergency Contact:    Outpatient Follow-up (Specialty/Name of physician):  Dr. Cheng March  Neurology  12/24/24 appt     Cardiology    ENT  [phone]

## 2025-01-05 NOTE — Clinical Note
Level of Care: Telemetry [5]   Admitting Physician: LLANES ALVAREZ, CARLOS [[id number]]   Bed Request Comments: PCU

## 2025-01-05 NOTE — LETTER
January 13, 2025     Patient: Gladys Garrison   YOB: 1960   Date of Visit: 1/5/2025       To Whom It May Concern:    It is my medical opinion that Gladys Garrison should remain out of work until Follow up with doctor in 6 weeks .           Sincerely,      Ranjit Foss

## 2025-01-06 ENCOUNTER — APPOINTMENT (OUTPATIENT)
Dept: CT IMAGING | Facility: HOSPITAL | Age: 65
DRG: 314 | End: 2025-01-06
Payer: MEDICARE

## 2025-01-06 ENCOUNTER — APPOINTMENT (OUTPATIENT)
Dept: GENERAL RADIOLOGY | Facility: HOSPITAL | Age: 65
DRG: 314 | End: 2025-01-06
Payer: MEDICARE

## 2025-01-06 ENCOUNTER — APPOINTMENT (OUTPATIENT)
Dept: ULTRASOUND IMAGING | Facility: HOSPITAL | Age: 65
DRG: 314 | End: 2025-01-06
Payer: MEDICARE

## 2025-01-06 PROBLEM — N17.9 AKI (ACUTE KIDNEY INJURY): Status: ACTIVE | Noted: 2025-01-06

## 2025-01-06 LAB
ALBUMIN SERPL-MCNC: 3.2 G/DL (ref 3.5–5.2)
ALBUMIN SERPL-MCNC: 3.3 G/DL (ref 3.5–5.2)
ALBUMIN/GLOB SERPL: 0.9 G/DL
ALBUMIN/GLOB SERPL: 1.1 G/DL
ALP SERPL-CCNC: 43 U/L (ref 39–117)
ALP SERPL-CCNC: 64 U/L (ref 39–117)
ALT SERPL W P-5'-P-CCNC: 18 U/L (ref 1–33)
ALT SERPL W P-5'-P-CCNC: 24 U/L (ref 1–33)
ANION GAP SERPL CALCULATED.3IONS-SCNC: 13.4 MMOL/L (ref 5–15)
ANION GAP SERPL CALCULATED.3IONS-SCNC: 15.5 MMOL/L (ref 5–15)
AST SERPL-CCNC: 36 U/L (ref 1–32)
AST SERPL-CCNC: 46 U/L (ref 1–32)
B PARAPERT DNA SPEC QL NAA+PROBE: NOT DETECTED
B PERT DNA SPEC QL NAA+PROBE: NOT DETECTED
BACTERIA BLD CULT: ABNORMAL
BACTERIA UR QL AUTO: ABNORMAL /HPF
BILIRUB SERPL-MCNC: 1.4 MG/DL (ref 0–1.2)
BILIRUB SERPL-MCNC: 1.4 MG/DL (ref 0–1.2)
BILIRUB UR QL STRIP: ABNORMAL
BOTTLE TYPE: ABNORMAL
BUN SERPL-MCNC: 52 MG/DL (ref 8–23)
BUN SERPL-MCNC: 56 MG/DL (ref 8–23)
BUN/CREAT SERPL: 27.3 (ref 7–25)
BUN/CREAT SERPL: 29.1 (ref 7–25)
BURR CELLS BLD QL SMEAR: ABNORMAL
C PNEUM DNA NPH QL NAA+NON-PROBE: NOT DETECTED
C3 FRG RBC-MCNC: ABNORMAL
CALCIUM SPEC-SCNC: 7.7 MG/DL (ref 8.6–10.5)
CALCIUM SPEC-SCNC: 9 MG/DL (ref 8.6–10.5)
CHLORIDE SERPL-SCNC: 100 MMOL/L (ref 98–107)
CHLORIDE SERPL-SCNC: 106 MMOL/L (ref 98–107)
CLARITY UR: ABNORMAL
CO2 SERPL-SCNC: 16.6 MMOL/L (ref 22–29)
CO2 SERPL-SCNC: 19.5 MMOL/L (ref 22–29)
COLOR UR: ABNORMAL
CREAT SERPL-MCNC: 1.79 MG/DL (ref 0.57–1)
CREAT SERPL-MCNC: 2.05 MG/DL (ref 0.57–1)
D-LACTATE SERPL-SCNC: 1.5 MMOL/L (ref 0.3–2)
DACRYOCYTES BLD QL SMEAR: ABNORMAL
DACRYOCYTES BLD QL SMEAR: ABNORMAL
DEPRECATED RDW RBC AUTO: 48.8 FL (ref 37–54)
DEPRECATED RDW RBC AUTO: 51.6 FL (ref 37–54)
EGFRCR SERPLBLD CKD-EPI 2021: 27 ML/MIN/1.73
EGFRCR SERPLBLD CKD-EPI 2021: 31.7 ML/MIN/1.73
ERYTHROCYTE [DISTWIDTH] IN BLOOD BY AUTOMATED COUNT: 14.5 % (ref 12.3–15.4)
ERYTHROCYTE [DISTWIDTH] IN BLOOD BY AUTOMATED COUNT: 14.6 % (ref 12.3–15.4)
FLUAV SUBTYP SPEC NAA+PROBE: NOT DETECTED
FLUBV RNA ISLT QL NAA+PROBE: NOT DETECTED
GIANT PLATELETS: ABNORMAL
GIANT PLATELETS: ABNORMAL
GLOBULIN UR ELPH-MCNC: 2.9 GM/DL
GLOBULIN UR ELPH-MCNC: 3.4 GM/DL
GLUCOSE BLDC GLUCOMTR-MCNC: 101 MG/DL (ref 70–105)
GLUCOSE BLDC GLUCOMTR-MCNC: 79 MG/DL (ref 70–105)
GLUCOSE BLDC GLUCOMTR-MCNC: 91 MG/DL (ref 70–105)
GLUCOSE SERPL-MCNC: 116 MG/DL (ref 65–99)
GLUCOSE SERPL-MCNC: 117 MG/DL (ref 65–99)
GLUCOSE UR STRIP-MCNC: ABNORMAL MG/DL
HADV DNA SPEC NAA+PROBE: NOT DETECTED
HCOV 229E RNA SPEC QL NAA+PROBE: NOT DETECTED
HCOV HKU1 RNA SPEC QL NAA+PROBE: NOT DETECTED
HCOV NL63 RNA SPEC QL NAA+PROBE: NOT DETECTED
HCOV OC43 RNA SPEC QL NAA+PROBE: NOT DETECTED
HCT VFR BLD AUTO: 36.4 % (ref 34–46.6)
HCT VFR BLD AUTO: 37.8 % (ref 34–46.6)
HGB BLD-MCNC: 11.5 G/DL (ref 12–15.9)
HGB BLD-MCNC: 12.6 G/DL (ref 12–15.9)
HGB UR QL STRIP.AUTO: NEGATIVE
HMPV RNA NPH QL NAA+NON-PROBE: NOT DETECTED
HOLD SPECIMEN: NORMAL
HOLD SPECIMEN: NORMAL
HPIV1 RNA ISLT QL NAA+PROBE: NOT DETECTED
HPIV2 RNA SPEC QL NAA+PROBE: NOT DETECTED
HPIV3 RNA NPH QL NAA+PROBE: NOT DETECTED
HPIV4 P GENE NPH QL NAA+PROBE: NOT DETECTED
HYALINE CASTS UR QL AUTO: ABNORMAL /LPF
KETONES UR QL STRIP: ABNORMAL
LARGE PLATELETS: ABNORMAL
LEUKOCYTE ESTERASE UR QL STRIP.AUTO: ABNORMAL
LYMPHOCYTES # BLD MANUAL: 0.1 10*3/MM3 (ref 0.7–3.1)
LYMPHOCYTES # BLD MANUAL: 0.34 10*3/MM3 (ref 0.7–3.1)
LYMPHOCYTES NFR BLD MANUAL: 1 % (ref 5–12)
LYMPHOCYTES NFR BLD MANUAL: 4 % (ref 5–12)
M PNEUMO IGG SER IA-ACNC: NOT DETECTED
MAGNESIUM SERPL-MCNC: 2 MG/DL (ref 1.6–2.4)
MCH RBC QN AUTO: 30.1 PG (ref 26.6–33)
MCH RBC QN AUTO: 30.5 PG (ref 26.6–33)
MCHC RBC AUTO-ENTMCNC: 31.6 G/DL (ref 31.5–35.7)
MCHC RBC AUTO-ENTMCNC: 33.3 G/DL (ref 31.5–35.7)
MCV RBC AUTO: 91.5 FL (ref 79–97)
MCV RBC AUTO: 95.3 FL (ref 79–97)
METAMYELOCYTES NFR BLD MANUAL: 12 % (ref 0–0)
METAMYELOCYTES NFR BLD MANUAL: 2 % (ref 0–0)
MONOCYTES # BLD: 0.11 10*3/MM3 (ref 0.1–0.9)
MONOCYTES # BLD: 0.2 10*3/MM3 (ref 0.1–0.9)
NEUTROPHILS # BLD AUTO: 10.72 10*3/MM3 (ref 1.7–7)
NEUTROPHILS # BLD AUTO: 4.01 10*3/MM3 (ref 1.7–7)
NEUTROPHILS NFR BLD MANUAL: 64 % (ref 42.7–76)
NEUTROPHILS NFR BLD MANUAL: 80 % (ref 42.7–76)
NEUTS BAND NFR BLD MANUAL: 14 % (ref 0–5)
NEUTS BAND NFR BLD MANUAL: 18 % (ref 0–5)
NEUTS VAC BLD QL SMEAR: ABNORMAL
NITRITE UR QL STRIP: NEGATIVE
PATHOLOGY REVIEW: YES
PH UR STRIP.AUTO: <=5 [PH] (ref 5–8)
PHOSPHATE SERPL-MCNC: 2.4 MG/DL (ref 2.5–4.5)
PLATELET # BLD AUTO: 42 10*3/MM3 (ref 140–450)
PLATELET # BLD AUTO: 52 10*3/MM3 (ref 140–450)
PMV BLD AUTO: 11 FL (ref 6–12)
PMV BLD AUTO: 12.6 FL (ref 6–12)
POIKILOCYTOSIS BLD QL SMEAR: ABNORMAL
POIKILOCYTOSIS BLD QL SMEAR: ABNORMAL
POTASSIUM SERPL-SCNC: 3.2 MMOL/L (ref 3.5–5.2)
POTASSIUM SERPL-SCNC: 3.4 MMOL/L (ref 3.5–5.2)
PROT SERPL-MCNC: 6.2 G/DL (ref 6–8.5)
PROT SERPL-MCNC: 6.6 G/DL (ref 6–8.5)
PROT UR QL STRIP: ABNORMAL
QT INTERVAL: 406 MS
QTC INTERVAL: 499 MS
RBC # BLD AUTO: 3.82 10*6/MM3 (ref 3.77–5.28)
RBC # BLD AUTO: 4.13 10*6/MM3 (ref 3.77–5.28)
RBC # UR STRIP: ABNORMAL /HPF
REF LAB TEST METHOD: ABNORMAL
RHINOVIRUS RNA SPEC NAA+PROBE: NOT DETECTED
RSV RNA NPH QL NAA+NON-PROBE: NOT DETECTED
SARS-COV-2 RNA RESP QL NAA+PROBE: NOT DETECTED
SCAN SLIDE: NORMAL
SCAN SLIDE: NORMAL
SMALL PLATELETS BLD QL SMEAR: ABNORMAL
SMALL PLATELETS BLD QL SMEAR: ABNORMAL
SODIUM SERPL-SCNC: 135 MMOL/L (ref 136–145)
SODIUM SERPL-SCNC: 136 MMOL/L (ref 136–145)
SP GR UR STRIP: 1.02 (ref 1–1.03)
SQUAMOUS #/AREA URNS HPF: ABNORMAL /HPF
TOXIC GRANULATION: ABNORMAL
TRANS CELLS #/AREA URNS HPF: ABNORMAL /HPF
UROBILINOGEN UR QL STRIP: ABNORMAL
VARIANT LYMPHS NFR BLD MANUAL: 0 % (ref 19.6–45.3)
VARIANT LYMPHS NFR BLD MANUAL: 1 % (ref 19.6–45.3)
VARIANT LYMPHS NFR BLD MANUAL: 2 % (ref 0–5)
VARIANT LYMPHS NFR BLD MANUAL: 2 % (ref 0–5)
WBC # UR STRIP: ABNORMAL /HPF
WBC MORPH BLD: NORMAL
WBC NRBC COR # BLD AUTO: 11.4 10*3/MM3 (ref 3.4–10.8)
WBC NRBC COR # BLD AUTO: 4.89 10*3/MM3 (ref 3.4–10.8)
WHOLE BLOOD HOLD COAG: NORMAL
WHOLE BLOOD HOLD SPECIMEN: NORMAL

## 2025-01-06 PROCEDURE — 25810000003 SODIUM CHLORIDE 0.9 % SOLUTION: Performed by: STUDENT IN AN ORGANIZED HEALTH CARE EDUCATION/TRAINING PROGRAM

## 2025-01-06 PROCEDURE — 82948 REAGENT STRIP/BLOOD GLUCOSE: CPT

## 2025-01-06 PROCEDURE — 81001 URINALYSIS AUTO W/SCOPE: CPT | Performed by: EMERGENCY MEDICINE

## 2025-01-06 PROCEDURE — 25810000003 SEPSIS FLUID NS 0.9 % SOLUTION: Performed by: EMERGENCY MEDICINE

## 2025-01-06 PROCEDURE — 72125 CT NECK SPINE W/O DYE: CPT

## 2025-01-06 PROCEDURE — 25010000002 ALBUMIN HUMAN 25% PER 50 ML: Performed by: STUDENT IN AN ORGANIZED HEALTH CARE EDUCATION/TRAINING PROGRAM

## 2025-01-06 PROCEDURE — 85007 BL SMEAR W/DIFF WBC COUNT: CPT | Performed by: STUDENT IN AN ORGANIZED HEALTH CARE EDUCATION/TRAINING PROGRAM

## 2025-01-06 PROCEDURE — 74176 CT ABD & PELVIS W/O CONTRAST: CPT

## 2025-01-06 PROCEDURE — 87147 CULTURE TYPE IMMUNOLOGIC: CPT | Performed by: EMERGENCY MEDICINE

## 2025-01-06 PROCEDURE — 71045 X-RAY EXAM CHEST 1 VIEW: CPT

## 2025-01-06 PROCEDURE — 25010000002 CEFTRIAXONE PER 250 MG: Performed by: EMERGENCY MEDICINE

## 2025-01-06 PROCEDURE — 25010000002 METRONIDAZOLE 500 MG/100ML SOLUTION: Performed by: EMERGENCY MEDICINE

## 2025-01-06 PROCEDURE — 85025 COMPLETE CBC W/AUTO DIFF WBC: CPT | Performed by: EMERGENCY MEDICINE

## 2025-01-06 PROCEDURE — 80053 COMPREHEN METABOLIC PANEL: CPT | Performed by: STUDENT IN AN ORGANIZED HEALTH CARE EDUCATION/TRAINING PROGRAM

## 2025-01-06 PROCEDURE — 25010000002 METRONIDAZOLE 500 MG/100ML SOLUTION: Performed by: STUDENT IN AN ORGANIZED HEALTH CARE EDUCATION/TRAINING PROGRAM

## 2025-01-06 PROCEDURE — 87186 SC STD MICRODIL/AGAR DIL: CPT | Performed by: EMERGENCY MEDICINE

## 2025-01-06 PROCEDURE — 36415 COLL VENOUS BLD VENIPUNCTURE: CPT

## 2025-01-06 PROCEDURE — 25810000003 SODIUM CHLORIDE 0.9 % SOLUTION: Performed by: EMERGENCY MEDICINE

## 2025-01-06 PROCEDURE — 70486 CT MAXILLOFACIAL W/O DYE: CPT

## 2025-01-06 PROCEDURE — 85007 BL SMEAR W/DIFF WBC COUNT: CPT | Performed by: EMERGENCY MEDICINE

## 2025-01-06 PROCEDURE — 83605 ASSAY OF LACTIC ACID: CPT

## 2025-01-06 PROCEDURE — 82948 REAGENT STRIP/BLOOD GLUCOSE: CPT | Performed by: STUDENT IN AN ORGANIZED HEALTH CARE EDUCATION/TRAINING PROGRAM

## 2025-01-06 PROCEDURE — 87040 BLOOD CULTURE FOR BACTERIA: CPT | Performed by: EMERGENCY MEDICINE

## 2025-01-06 PROCEDURE — 80053 COMPREHEN METABOLIC PANEL: CPT | Performed by: EMERGENCY MEDICINE

## 2025-01-06 PROCEDURE — P9612 CATHETERIZE FOR URINE SPEC: HCPCS

## 2025-01-06 PROCEDURE — P9047 ALBUMIN (HUMAN), 25%, 50ML: HCPCS | Performed by: STUDENT IN AN ORGANIZED HEALTH CARE EDUCATION/TRAINING PROGRAM

## 2025-01-06 PROCEDURE — 93005 ELECTROCARDIOGRAM TRACING: CPT | Performed by: EMERGENCY MEDICINE

## 2025-01-06 PROCEDURE — 84100 ASSAY OF PHOSPHORUS: CPT | Performed by: STUDENT IN AN ORGANIZED HEALTH CARE EDUCATION/TRAINING PROGRAM

## 2025-01-06 PROCEDURE — 25010000002 HEPARIN (PORCINE) PER 1000 UNITS: Performed by: STUDENT IN AN ORGANIZED HEALTH CARE EDUCATION/TRAINING PROGRAM

## 2025-01-06 PROCEDURE — 0202U NFCT DS 22 TRGT SARS-COV-2: CPT | Performed by: EMERGENCY MEDICINE

## 2025-01-06 PROCEDURE — 25010000002 CEFTRIAXONE PER 250 MG: Performed by: STUDENT IN AN ORGANIZED HEALTH CARE EDUCATION/TRAINING PROGRAM

## 2025-01-06 PROCEDURE — 87154 CUL TYP ID BLD PTHGN 6+ TRGT: CPT | Performed by: EMERGENCY MEDICINE

## 2025-01-06 PROCEDURE — 70450 CT HEAD/BRAIN W/O DYE: CPT

## 2025-01-06 PROCEDURE — 83735 ASSAY OF MAGNESIUM: CPT | Performed by: STUDENT IN AN ORGANIZED HEALTH CARE EDUCATION/TRAINING PROGRAM

## 2025-01-06 PROCEDURE — 76775 US EXAM ABDO BACK WALL LIM: CPT

## 2025-01-06 PROCEDURE — 87086 URINE CULTURE/COLONY COUNT: CPT | Performed by: EMERGENCY MEDICINE

## 2025-01-06 PROCEDURE — 85025 COMPLETE CBC W/AUTO DIFF WBC: CPT | Performed by: STUDENT IN AN ORGANIZED HEALTH CARE EDUCATION/TRAINING PROGRAM

## 2025-01-06 RX ORDER — METRONIDAZOLE 500 MG/100ML
500 INJECTION, SOLUTION INTRAVENOUS ONCE
Status: COMPLETED | OUTPATIENT
Start: 2025-01-06 | End: 2025-01-06

## 2025-01-06 RX ORDER — HYDROCODONE BITARTRATE AND ACETAMINOPHEN 10; 325 MG/1; MG/1
1 TABLET ORAL EVERY 4 HOURS PRN
Status: DISPENSED | OUTPATIENT
Start: 2025-01-06 | End: 2025-01-09

## 2025-01-06 RX ORDER — SODIUM CHLORIDE 0.9 % (FLUSH) 0.9 %
10 SYRINGE (ML) INJECTION AS NEEDED
Status: DISCONTINUED | OUTPATIENT
Start: 2025-01-06 | End: 2025-01-13 | Stop reason: HOSPADM

## 2025-01-06 RX ORDER — POTASSIUM CHLORIDE 1500 MG/1
20 TABLET, EXTENDED RELEASE ORAL ONCE
Status: DISCONTINUED | OUTPATIENT
Start: 2025-01-06 | End: 2025-01-06

## 2025-01-06 RX ORDER — HEPARIN SODIUM 5000 [USP'U]/ML
5000 INJECTION, SOLUTION INTRAVENOUS; SUBCUTANEOUS EVERY 8 HOURS SCHEDULED
Status: DISCONTINUED | OUTPATIENT
Start: 2025-01-06 | End: 2025-01-13 | Stop reason: HOSPADM

## 2025-01-06 RX ORDER — INSULIN LISPRO 100 [IU]/ML
2-7 INJECTION, SOLUTION INTRAVENOUS; SUBCUTANEOUS
Status: DISCONTINUED | OUTPATIENT
Start: 2025-01-06 | End: 2025-01-13 | Stop reason: HOSPADM

## 2025-01-06 RX ORDER — PANTOPRAZOLE SODIUM 40 MG/1
40 TABLET, DELAYED RELEASE ORAL
Status: DISCONTINUED | OUTPATIENT
Start: 2025-01-07 | End: 2025-01-09

## 2025-01-06 RX ORDER — POTASSIUM CHLORIDE 1500 MG/1
20 TABLET, EXTENDED RELEASE ORAL ONCE
Status: COMPLETED | OUTPATIENT
Start: 2025-01-06 | End: 2025-01-06

## 2025-01-06 RX ORDER — DEXTROSE MONOHYDRATE 25 G/50ML
25 INJECTION, SOLUTION INTRAVENOUS
Status: DISCONTINUED | OUTPATIENT
Start: 2025-01-06 | End: 2025-01-13 | Stop reason: HOSPADM

## 2025-01-06 RX ORDER — NITROGLYCERIN 0.4 MG/1
0.4 TABLET SUBLINGUAL
Status: DISCONTINUED | OUTPATIENT
Start: 2025-01-06 | End: 2025-01-13 | Stop reason: HOSPADM

## 2025-01-06 RX ORDER — NICOTINE POLACRILEX 4 MG
15 LOZENGE BUCCAL
Status: DISCONTINUED | OUTPATIENT
Start: 2025-01-06 | End: 2025-01-13 | Stop reason: HOSPADM

## 2025-01-06 RX ORDER — METRONIDAZOLE 500 MG/100ML
500 INJECTION, SOLUTION INTRAVENOUS EVERY 8 HOURS
Status: DISCONTINUED | OUTPATIENT
Start: 2025-01-06 | End: 2025-01-07

## 2025-01-06 RX ORDER — SODIUM CHLORIDE 9 MG/ML
100 INJECTION, SOLUTION INTRAVENOUS CONTINUOUS
Status: DISPENSED | OUTPATIENT
Start: 2025-01-06 | End: 2025-01-07

## 2025-01-06 RX ORDER — SODIUM CHLORIDE 9 MG/ML
40 INJECTION, SOLUTION INTRAVENOUS AS NEEDED
Status: DISCONTINUED | OUTPATIENT
Start: 2025-01-06 | End: 2025-01-13 | Stop reason: HOSPADM

## 2025-01-06 RX ORDER — ALBUMIN (HUMAN) 12.5 G/50ML
25 SOLUTION INTRAVENOUS ONCE
Status: COMPLETED | OUTPATIENT
Start: 2025-01-06 | End: 2025-01-06

## 2025-01-06 RX ORDER — IBUPROFEN 600 MG/1
1 TABLET ORAL
Status: DISCONTINUED | OUTPATIENT
Start: 2025-01-06 | End: 2025-01-13 | Stop reason: HOSPADM

## 2025-01-06 RX ORDER — ONDANSETRON 2 MG/ML
4 INJECTION INTRAMUSCULAR; INTRAVENOUS EVERY 6 HOURS PRN
Status: DISCONTINUED | OUTPATIENT
Start: 2025-01-06 | End: 2025-01-13 | Stop reason: HOSPADM

## 2025-01-06 RX ORDER — SODIUM CHLORIDE 0.9 % (FLUSH) 0.9 %
10 SYRINGE (ML) INJECTION EVERY 12 HOURS SCHEDULED
Status: DISCONTINUED | OUTPATIENT
Start: 2025-01-06 | End: 2025-01-13 | Stop reason: HOSPADM

## 2025-01-06 RX ADMIN — HEPARIN SODIUM 5000 UNITS: 5000 INJECTION INTRAVENOUS; SUBCUTANEOUS at 13:42

## 2025-01-06 RX ADMIN — HEPARIN SODIUM 5000 UNITS: 5000 INJECTION INTRAVENOUS; SUBCUTANEOUS at 05:29

## 2025-01-06 RX ADMIN — SODIUM CHLORIDE 500 ML: 9 INJECTION, SOLUTION INTRAVENOUS at 04:45

## 2025-01-06 RX ADMIN — POTASSIUM CHLORIDE 20 MEQ: 1500 TABLET, EXTENDED RELEASE ORAL at 12:16

## 2025-01-06 RX ADMIN — POTASSIUM CHLORIDE 20 MEQ: 1500 TABLET, EXTENDED RELEASE ORAL at 05:29

## 2025-01-06 RX ADMIN — CEFTRIAXONE SODIUM 1000 MG: 1 INJECTION, POWDER, FOR SOLUTION INTRAMUSCULAR; INTRAVENOUS at 02:39

## 2025-01-06 RX ADMIN — ALBUMIN (HUMAN) 25 G: 0.25 INJECTION, SOLUTION INTRAVENOUS at 04:44

## 2025-01-06 RX ADMIN — METRONIDAZOLE 500 MG: 5 INJECTION, SOLUTION INTRAVENOUS at 13:42

## 2025-01-06 RX ADMIN — METRONIDAZOLE 500 MG: 5 INJECTION, SOLUTION INTRAVENOUS at 20:53

## 2025-01-06 RX ADMIN — METRONIDAZOLE 500 MG: 500 INJECTION, SOLUTION INTRAVENOUS at 03:35

## 2025-01-06 RX ADMIN — HEPARIN SODIUM 5000 UNITS: 5000 INJECTION INTRAVENOUS; SUBCUTANEOUS at 21:05

## 2025-01-06 RX ADMIN — SODIUM CHLORIDE 125 ML/HR: 9 INJECTION, SOLUTION INTRAVENOUS at 04:43

## 2025-01-06 RX ADMIN — Medication 10 ML: at 21:10

## 2025-01-06 RX ADMIN — SODIUM CHLORIDE 100 ML/HR: 9 INJECTION, SOLUTION INTRAVENOUS at 19:42

## 2025-01-06 RX ADMIN — SODIUM CHLORIDE 1000 ML: 9 INJECTION, SOLUTION INTRAVENOUS at 02:36

## 2025-01-06 RX ADMIN — CEFTRIAXONE 1000 MG: 1 INJECTION, POWDER, FOR SOLUTION INTRAMUSCULAR; INTRAVENOUS at 15:48

## 2025-01-06 RX ADMIN — SODIUM CHLORIDE 1389 ML: 9 INJECTION, SOLUTION INTRAVENOUS at 00:25

## 2025-01-06 NOTE — H&P
"[facility] Medicine Services  History & Physical    Patient Name: Gladys Garrison  : 1960  MRN: [id number]  Primary Care Physician:  Chirag Robles DO  Date of admission: 2025  Date and Time of Service: 2025 at 0430    Subjective      Chief Complaint: \"diarrhea, nausea, generalized weakness\"    History of Present Illness: Gladys Garrison is a 64 y.o. female with a PMH of breast cancer with metastasis to bone, complex cardiac medical history including tetralogy of Fallot, coarctation of aorta, VSD status post repair, coarctation of aorta S/P stent who presented to [facility] on 2025 with diarrhea since around last Tuesday it is dark color, associated with nausea and multiple episodes of vomiting. States she has not been able to keep anything down, having generalized abdominal cramps, subjective fever and chills, generalized weakness. States she was treated for a UTI last month. States she has not urinated at all in past 3-4 days. Denies hematuria or dysuria, no flank pain. She sustained a fall 2 days ago and hit her head and face, denies LOC. Workup in ER chemistry labs show creat 2.05, K 3.4, Na 135, albumin 3.2, T bili 1.4. CBC labs Hb 12.6, wbc 11.4, platelets 52. ED course notable for hypotension that slightly improved with IVF, temp 100.1. Saturating well on room air. The decision to admit was made.       Review of Systems   Constitutional:  Positive for fatigue and fever. Negative for chills.   Respiratory:  Negative for cough, choking and shortness of breath.    Cardiovascular:  Negative for chest pain.   Gastrointestinal:  Positive for abdominal pain, blood in stool, diarrhea, nausea and vomiting.   Genitourinary:  Positive for decreased urine volume. Negative for dysuria, flank pain and hematuria.   Neurological:  Positive for light-headedness. Negative for syncope.       Personal History     Past Medical History:   Diagnosis Date    Allergic     Bone cancer  "    METASTATIC BONE; stage 4    Bradycardia     SECONDARY TO ABLATION    Breast cancer     mets to lymph nodes; did not do radiation    Cancer of unknown origin     Compression fx, thoracic spine, open, initial encounter     Coronary artery disease     COVID-19 2021    Diabetes mellitus     Heart disease, unspecified     Hepatitis C     RESOLVED WITH MEDICATION    Hyperlipidemia     Hypertension     Obesity (BMI 30-39.9) 2021    Rib fracture     Per patient    Sleep apnea     no machine    Tachycardia     ATRIAL    Type 2 diabetes mellitus 2017       Past Surgical History:   Procedure Laterality Date    BACK SURGERY      neck X 2    BREAST RECONSTRUCTION Bilateral     CARDIAC ABLATION       atrial tachycardia x 5 ablations     CARDIAC CATHETERIZATION      CARDIAC ELECTROPHYSIOLOGY PROCEDURE N/A 2023    Procedure: Pacemaker RV lead insertion with generator change out. Xlumenatronic;  Surgeon: Steven Hammond MD;  Location: [facility] CATH INVASIVE LOCATION;  Service: Cardiovascular;  Laterality: N/A;    CARDIAC SURGERY      stent placed in aorta    CARDIAC SURGERY      6 surgeries as baby     CERVICAL FUSION ANTERIOR WITH ARTIFICIAL DISCECTOMY IMPLANTATION N/A 2020    Procedure: C4 VERTEBRECTOMY AND ANTERIOR CERIVCAL DISCECTOMY WITH FUSION OF CERVICAL THREE THROUGH FIVE WITH REMOVAL OF HARDWARE C5-C6;  Surgeon: Mark Kaba MD;  Location: [facility] MAIN OR;  Service: Neurosurgery;  Laterality: N/A;     SECTION      x2    COLONOSCOPY N/A 10/23/2020    Procedure: COLONOSCOPY WITH POLYPECTOMY X6;  Surgeon: Stanley Bourne MD;  Location: [facility] ENDOSCOPY;  Service: Gastroenterology;  Laterality: N/A;  POLYPS, INTERNAL HEMORRHOIDS    ENDOMETRIAL ABLATION      REMOVAL SCAR TISSUE UTERINE    ENDOSCOPY N/A 10/23/2020    Procedure: ESOPHAGOGASTRODUODENOSCOPY WITH BIOPSY X 1 AREA;  Surgeon: Stanley Bourne MD;  Location: [facility] ENDOSCOPY;  Service: Gastroenterology;   Laterality: N/A;  GASTRITIS, ESOPHAGITIS, HIATAL HERNIA    INSERT / REPLACE / REMOVE PACEMAKER      KNEE SURGERY  2000    MASTECTOMY Bilateral     NECK SURGERY      PACEMAKER IMPLANTATION      PAIN PUMP INSERTION/REVISION N/A 04/05/2022    Procedure: PAIN PUMP INSERTION AND INTRATHECAL CATHETER PLACEMENT;  Surgeon: Chuck Hunter MD;  Location: [facility] MAIN OR;  Service: Pain Management;  Laterality: N/A;       Family History: family history includes Aneurysm in her father; Cancer in her maternal aunt; Diabetes in her sister; Diabetes type I in her half-sister; Heart attack in her sister; Heart disease in her mother; Lung cancer in her mother; No Known Problems in her brother, brother, and sister; Stroke in her mother; Thyroid cancer in her half-sister; Thyroid disease in her sister. Otherwise pertinent FHx was reviewed and not pertinent to current issue.    Social History:  reports that she has never smoked. She has never been exposed to tobacco smoke. She has never used smokeless tobacco. She reports current alcohol use. She reports that she does not use drugs.    Home Medications:  Prior to Admission Medications       Prescriptions Last Dose Informant Patient Reported? Taking?    Abemaciclib (VERZENIO) 100 mg tablet chemo tablet   No No    Take 1 tablet by mouth 2 (Two) Times a Day. Take with or without food; Swallow whole, do not crush, chew or split tablets.    Blood Glucose Monitoring Suppl (Accu-Chek Araceli) device   No No    Use as instructed   To test blood sugar bid    Calcium Carbonate-Vit D-Min (Calcium 600+D Plus Minerals) 600-400 MG-UNIT chewable tablet   No No    Chew 600 mg 2 (Two) Times a Day.    Continuous Blood Gluc  (FreeStyle Rajeev 14 Day [place]) device   No No    1 each Daily.    Continuous Blood Gluc Sensor (FreeStyle Rajeev 14 Day Sensor) misc   No No    1 each Daily.    cyclobenzaprine (FLEXERIL) 10 MG tablet   No No    Take 1 tablet by mouth 2 (Two) Times a Day As Needed for Muscle  "Spasms.    diphenoxylate-atropine (LOMOTIL) 2.5-0.025 MG per tablet   No No    Take 1 tablet by mouth 3 (Three) Times a Day.    exemestane (AROMASIN) 25 MG tablet   Yes No    Take 1 tablet by mouth Daily.    famotidine (PEPCID) 20 MG tablet   Yes No    Take 1 tablet by mouth 2 (Two) Times a Day As Needed for Heartburn.    fluticasone (FLONASE) 50 MCG/ACT nasal spray   Yes No    Administer 2 sprays into the nostril(s) as directed by provider.    HYDROcodone-acetaminophen (NORCO)  MG per tablet   No No    Take 1 tablet by mouth Every 4 (Four) Hours As Needed for Moderate Pain.    ibuprofen (ADVIL,MOTRIN) 800 MG tablet   No No    Take 1 tablet by mouth Every 6 (Six) Hours As Needed for Mild Pain.    insulin NPH-insulin regular (humuLIN 70/30,novoLIN 70/30) (70-30) 100 UNIT/ML injection   No No    Inject 5 Units under the skin into the appropriate area as directed Every Evening. If BS over 180    Insulin Pen Needle (B-D UF III MINI PEN NEEDLES) 31G X 5 MM misc   No No    Use to inject insulin twice daily   DX: E11.9    Insulin Syringe-Needle U-100 28G X 1/2\" 1 ML misc   No No    1 each 2 (Two) Times a Day.    losartan (Cozaar) 50 MG tablet   No No    Take 1 tablet by mouth Daily. Discontinue the lisinopril due to interaction with new chemotherapy    Morphine Sulfate, PF, 8 MG/ML solution   Yes No    8.5 mg by Intrathecal Infusion route Daily. Receives 8 mg through pain pump q 24 hrs    Naloxegol Oxalate (Movantik) 12.5 MG tablet   No No    Take 1 tablet by mouth Every Morning.    ondansetron ODT (ZOFRAN-ODT) 4 MG disintegrating tablet   No No    Place 2 tablets on the tongue Every 8 (Eight) Hours As Needed for Nausea or Vomiting.    rosuvastatin (Crestor) 20 MG tablet   No No    Take 1 tablet by mouth Every Night.              Allergies:  Allergies   Allergen Reactions    Oxycodone Nausea And Vomiting    Promethazine Other (See Comments)     Hyperactive mean    Tape Other (See Comments)     .blisters      Adhesive " Tape Rash    Flexeril [Cyclobenzaprine] Rash       Objective      Vitals:   Temp:  [100.1 °F (37.8 °C)] 100.1 °F (37.8 °C)  Heart Rate:  [60-82] 66  Resp:  [19] 19  BP: (90-96)/(46-62) 93/50  Body mass index is 19.92 kg/m².  Physical Exam  Constitutional:       General: She is not in acute distress.     Appearance: She is ill-appearing.   HENT:      Head: Normocephalic.      Nose: Nose normal.      Mouth/Throat:      Mouth: Mucous membranes are dry.      Comments: Oral mucosae dry. Poor dentition.   Eyes:      Extraocular Movements: Extraocular movements intact.      Pupils: Pupils are equal, round, and reactive to light.   Cardiovascular:      Rate and Rhythm: Normal rate and regular rhythm.      Pulses: Normal pulses.      Heart sounds: Murmur heard.   Pulmonary:      Effort: Pulmonary effort is normal. No respiratory distress.      Breath sounds: Normal breath sounds. No wheezing.   Abdominal:      General: Abdomen is flat. There is no distension.      Palpations: Abdomen is soft.      Tenderness: There is no abdominal tenderness. There is no guarding.   Musculoskeletal:      Cervical back: Neck supple.   Skin:     General: Skin is dry.      Capillary Refill: Capillary refill takes less than 2 seconds.      Coloration: Skin is pale.   Neurological:      Mental Status: She is alert and oriented to person, place, and time.   Psychiatric:         Behavior: Behavior normal.         Diagnostic Data:  Lab Results (last 24 hours)       Procedure Component Value Units Date/Time    Respiratory Panel PCR w/COVID-19(SARS-CoV-2) ELISHA/MARQUEZ/HAMMAD/PAD/COR/SHARDA In-House, NP Swab in UTM/VTM, 2 HR TAT - Swab, Nasopharynx [[id number]]  (Normal) Collected: 01/06/25 0024    Specimen: Swab from Nasopharynx Updated: 01/06/25 0130     ADENOVIRUS, PCR Not Detected     Coronavirus 229E Not Detected     Coronavirus HKU1 Not Detected     Coronavirus NL63 Not Detected     Coronavirus OC43 Not Detected     COVID19 Not Detected     Human  Metapneumovirus Not Detected     Human Rhinovirus/Enterovirus Not Detected     Influenza A PCR Not Detected     Influenza B PCR Not Detected     Parainfluenza Virus 1 Not Detected     Parainfluenza Virus 2 Not Detected     Parainfluenza Virus 3 Not Detected     Parainfluenza Virus 4 Not Detected     RSV, PCR Not Detected     Bordetella pertussis pcr Not Detected     Bordetella parapertussis PCR Not Detected     Chlamydophila pneumoniae PCR Not Detected     Mycoplasma pneumo by PCR Not Detected    Narrative:      In the setting of a positive respiratory panel with a viral infection PLUS a negative procalcitonin without other underlying concern for bacterial infection, consider observing off antibiotics or discontinuation of antibiotics and continue supportive care. If the respiratory panel is positive for atypical bacterial infection (Bordetella pertussis, Chlamydophila pneumoniae, or Mycoplasma pneumoniae), consider antibiotic de-escalation to target atypical bacterial infection.    CBC & Differential [[id number]]  (Abnormal) Collected: 01/06/25 0022    Specimen: Blood Updated: 01/06/25 0059    Narrative:      The following orders were created for panel order CBC & Differential.  Procedure                               Abnormality         Status                     ---------                               -----------         ------                     CBC Auto Differential[[id number]]        Abnormal            Final result               Scan Slide[[id number]]                                       Final result                 Please view results for these tests on the individual orders.    CBC Auto Differential [[id number]]  (Abnormal) Collected: 01/06/25 0022    Specimen: Blood Updated: 01/06/25 0059     WBC 11.40 10*3/mm3      RBC 4.13 10*6/mm3      Hemoglobin 12.6 g/dL      Hematocrit 37.8 %      MCV 91.5 fL      MCH 30.5 pg      MCHC 33.3 g/dL      RDW 14.5 %      RDW-SD 48.8 fl      MPV 11.0 fL      Platelets 52  10*3/mm3     Scan Slide [[id number]] Collected: 01/06/25 0022    Specimen: Blood Updated: 01/06/25 0059     Scan Slide --     Comment: See Manual Differential Results       Manual Differential [[id number]]  (Abnormal) Collected: 01/06/25 0022    Specimen: Blood Updated: 01/06/25 0059     Neutrophil % 80.0 %      Lymphocyte % 1.0 %      Monocyte % 1.0 %      Bands %  14.0 %      Metamyelocyte % 2.0 %      Atypical Lymphocyte % 2.0 %      Neutrophils Absolute 10.72 10*3/mm3      Lymphocytes Absolute 0.34 10*3/mm3      Monocytes Absolute 0.11 10*3/mm3      Dacrocytes Slight/1+     Poikilocytes Slight/1+     RBC Fragments Slight/1+     WBC Morphology Normal     Platelet Estimate Decreased     Large Platelets Slight/1+     Giant Platelets Slight/1+    Urinalysis, Microscopic Only - Straight Cath [[id number]]  (Abnormal) Collected: 01/06/25 0035    Specimen: Urine from Straight Cath Updated: 01/06/25 0049     RBC, UA 3-5 /HPF      WBC, UA 21-50 /HPF      Bacteria, UA 1+ /HPF      Squamous Epithelial Cells, UA 13-20 /HPF      Transitional Epithelial Cells, UA 3-6 /HPF      Hyaline Casts, UA 0-2 /LPF      Methodology Automated Microscopy    Urine Culture - Urine, Straight Cath [[id number]] Collected: 01/06/25 0035    Specimen: Urine from Straight Cath Updated: 01/06/25 0049    Comprehensive Metabolic Panel [[id number]]  (Abnormal) Collected: 01/06/25 0022    Specimen: Blood Updated: 01/06/25 0048     Glucose 117 mg/dL      BUN 56 mg/dL      Creatinine 2.05 mg/dL      Sodium 135 mmol/L      Potassium 3.4 mmol/L      Comment: Slight hemolysis detected by analyzer. Result may be falsely elevated.        Chloride 100 mmol/L      CO2 19.5 mmol/L      Calcium 9.0 mg/dL      Total Protein 6.6 g/dL      Albumin 3.2 g/dL      ALT (SGPT) 24 U/L      AST (SGOT) 46 U/L      Alkaline Phosphatase 64 U/L      Total Bilirubin 1.4 mg/dL      Globulin 3.4 gm/dL      A/G Ratio 0.9 g/dL      BUN/Creatinine Ratio 27.3     Anion Gap 15.5 mmol/L       eGFR 27.0 mL/min/1.73     Narrative:      GFR Categories in Chronic Kidney Disease (CKD)      GFR Category          GFR (mL/min/1.73)    Interpretation  G1                     90 or greater         Normal or high (1)  G2                      60-89                Mild decrease (1)  G3a                   45-59                Mild to moderate decrease  G3b                   30-44                Moderate to severe decrease  G4                    15-29                Severe decrease  G5                    14 or less           Kidney failure          (1)In the absence of evidence of kidney disease, neither GFR category G1 or G2 fulfill the criteria for CKD.    eGFR calculation 2021 CKD-EPI creatinine equation, which does not include race as a factor    Urinalysis With Culture If Indicated - Straight Cath [[id number]]  (Abnormal) Collected: 01/06/25 0035    Specimen: Urine from Straight Cath Updated: 01/06/25 0045     Color, UA Dark Yellow     Appearance, UA Cloudy     pH, UA <=5.0     Specific Gravity, UA 1.025     Comment: Result obtained by Refractometer        Glucose,  mg/dL (Trace)     Ketones, UA 15 mg/dL (1+)     Bilirubin, UA Large (3+)     Comment: Confirmation testing is unavailable.  A serum bilirubin is recommended for further assessment.        Blood, UA Negative     Protein, UA >=300 mg/dL (3+)     Leuk Esterase, UA Small (1+)     Nitrite, UA Negative     Urobilinogen, UA 1.0 E.U./dL    Narrative:      In absence of clinical symptoms, the presence of pyuria, bacteria, and/or nitrites on the urinalysis result does not correlate with infection.    POC Lactate [[id number]]  (Normal) Collected: 01/06/25 0037    Specimen: Blood Updated: 01/06/25 0040     Lactate 1.5 mmol/L      Comment: Serial Number: [id number]:  [id number]       Blood Culture - Blood, Arm, Left [[id number]] Collected: 01/06/25 0032    Specimen: Blood from Arm, Left Updated: 01/06/25 0039    Blood Culture - Blood, Arm, Right [[id number]]  Collected: 01/06/25 0033    Specimen: Blood from Arm, Right Updated: 01/06/25 0039    [place] Draw [[id number]] Collected: 01/06/25 0022    Specimen: Blood Updated: 01/06/25 0031    Narrative:      The following orders were created for panel order [place] Draw.  Procedure                               Abnormality         Status                     ---------                               -----------         ------                     Green Top (Gel)[[id number]]                                  Final result               Lavender Top[[id number]]                                     Final result               Gold Top - SST[[id number]]                                   Final result               Light Blue Top[[id number]]                                   Final result                 Please view results for these tests on the individual orders.    Green Top (Gel) [[id number]] Collected: 01/06/25 0022    Specimen: Blood Updated: 01/06/25 0031     Extra Tube Hold for add-ons.     Comment: Auto resulted.       Lavender Top [[id number]] Collected: 01/06/25 0022    Specimen: Blood Updated: 01/06/25 0031     Extra Tube hold for add-on     Comment: Auto resulted       Gold Top - SST [[id number]] Collected: 01/06/25 0022    Specimen: Blood Updated: 01/06/25 0031     Extra Tube Hold for add-ons.     Comment: Auto resulted.       Light Blue Top [[id number]] Collected: 01/06/25 0022    Specimen: Blood Updated: 01/06/25 0031     Extra Tube Hold for add-ons.     Comment: Auto resulted                Imaging Results (Last 24 Hours)       Procedure Component Value Units Date/Time    CT Cervical Spine Without Contrast [[id number]] Collected: 01/06/25 0158     Updated: 01/06/25 0206    Narrative:      CT CERVICAL SPINE WO CONTRAST    Date of Exam: 1/6/2025 1:09 AM EST    Indication: Neck pain after fall.    Comparison: 3/29/2023    Technique: Axial CT images were obtained of the cervical spine without contrast administration.  Sagittal and  coronal reconstructions were performed.  Automated exposure control and iterative reconstruction methods were used.    Findings:  Patient is fused from C3-C7 with an anterior plate and screws from C3-C6. Cervical alignment is normal. There is multilevel facet arthropathy. There is disc space narrowing at C7-T1. There is scoliosis at the cervicothoracic junction. No acute cervical   spine fracture is identified. Paraspinal soft tissues are grossly normal.      Impression:      No acute cervical spine fracture. No significant interval change.      Electronically Signed: Brad Foster MD    1/6/2025 2:04 AM EST    Workstation ID: [id number]    CT Facial Bones Without Contrast [[id number]] Collected: 01/06/25 0154     Updated: 01/06/25 0200    Narrative:      CT FACIAL BONES WO CONTRAST    Date of Exam: 1/6/2025 1:09 AM EST    Indication: Fall with bruising.    Comparison: CT orbit 10/12/2018    Technique: Axial CT images were obtained from the inferior aspect of the mandible through the frontal sinuses without contrast administration.  Sagittal and coronal reconstructions were performed.  Automated exposure control and iterative reconstruction   methods were used.    Findings:  Temporomandibular joints demonstrate normal alignment. The mandible is intact. No acute facial bone fracture. No evidence of acute sinus disease. The globes and orbits appear grossly normal.      Impression:      Negative facial bone CT.        Electronically Signed: Brad Foster MD    1/6/2025 1:58 AM EST    Workstation ID: [id number]    CT Abdomen Pelvis Without Contrast [[id number]] Collected: 01/06/25 0144     Updated: 01/06/25 0158    Narrative:      CT ABDOMEN PELVIS WO CONTRAST    Date of Exam: 1/6/2025 1:09 AM EST    Indication: lower abdominal pain, diarrhea.    Comparison: CT abdomen pelvis August 5, 2024    Technique: Axial CT images were obtained of the abdomen and pelvis without the administration of contrast. Sagittal and coronal  reconstructions were performed.  Automated exposure control and iterative reconstruction methods were used.    Findings:  Lung Bases:     There is mild right middle lobe atelectasis. There is bilateral basilar atelectasis. The heart is enlarged. There are postsurgical changes of the pulmonary artery. Heavy coronary artery calcifications are seen.    Liver:  The liver is of normal size. There are no focal liver lesions with noncontrast technique.    Biliary/Gallbladder:    Layering density in the gallbladder may be stones or sludge. The biliary tree is normal.    Spleen:  There is mild splenomegaly.    Pancreas:    Pancreas is normal. There is no evidence of pancreatic mass or peripancreatic fluid.    Kidneys:    There is a nonobstructing stone of the left kidney. There is no hydronephrosis of either kidney.    Adrenals:    Adrenal glands are unremarkable.    Retroperitoneal/Lymph Nodes/Vasculature:    No retroperitoneal adenopathy is identified.    Gastrointestinal/Mesentery:    The bowel loops are non-dilated without wall thickening or mass. The appendix appears within normal limits. No evidence of obstruction. No free air. No mesenteric fluid collections identified.    Bladder:    The bladder is normal.    Genital:     Unremarkable          Bony Structures:     There are old compression fractures of T12 and L1. A benign hemangioma of L4 is unchanged. There is no acute fracture.        Impression:      Impression:  1.No acute abdominal or pelvic abnormality.  2.Mild splenomegaly.  3.Nonobstructing left renal stone.  4.Layering density in the gallbladder may be stones or sludge.                Electronically Signed: Rocky Heredia MD    1/6/2025 1:56 AM EST    Workstation ID: [id number]    CT Head Without Contrast [[id number]] Collected: 01/06/25 0148     Updated: 01/06/25 0156    Narrative:      CT HEAD WO CONTRAST    HISTORY:  Head injury from fall. Bruising.    TECHNIQUE:  Axial unenhanced head CT with coronal  reformats. Radiation dose reduction techniques included automated exposure control or exposure modulation based on body size.    COMPARISON:  11/2/2024, 11/5/2023    FINDINGS:  Ventricular size and configuration are normal. No acute infarct or hemorrhage is identified. There are no masses. There is no skull fracture. Atherosclerotic calcifications are present in the vertebral arteries and carotid siphons. Mild chronic small   vessel ischemic changes are present in the white matter.      Impression:      Senescent changes without acute abnormality.          Electronically Signed: Brad Foster MD    1/6/2025 1:54 AM EST    Workstation ID: [id number]    XR Chest 1 View [[id number]] Collected: 01/06/25 0036     Updated: 01/06/25 0040    Narrative:      XR CHEST 1 VW    Date of Exam: 1/6/2025 12:20 AM EST    Indication: cough, soa    Comparison: 11/2/2024    Findings:  There is a small stent graft in the proximal descending aorta. The heart remains enlarged status post CABG and valve repair. Left-sided multipolar pacer is present. Patient is status post mastectomy with bilateral breast reconstructions. Patient is also   status post cervical spinal fusion. No acute infiltrates are identified. No pneumothorax.      Impression:      1.Cardiomegaly with postoperative changes of CABG and valve repair.  2.No acute infiltrates identified.        Electronically Signed: Brad Foster MD    1/6/2025 12:38 AM EST    Workstation ID: [id number]              Assessment & Plan        This is a 64 y.o. female with:    Active and Resolved Problems  Active Hospital Problems    Diagnosis  POA    **JOSE (acute kidney injury) [N17.9]  Yes      Resolved Hospital Problems   No resolved problems to display.       Hypotension due to volume depletion  Diarrheal illness  Oliguric JOSE due to hypovolemia on the basis of diarrhea and vomiting  Melena by history  S/P 30 ml/kg IVF NS bolus in ED, remained borderline hypotensive and received 1 L  NS  Given IV albumin 25 g once  Continue  ml/hr  Pending C diff testing, enteric panel  Will continue IV ceftriaxone and flagyl  Serial bladder scans, checking renal US R/o obstructive pathology  Holding losartan due to JOSE  heme-negative stool, no overt GI bleed on exam, Hb stable. Monitor for bleeding  Will start CLD, advance diet as she tolerates  GI consultation    Possible UTI  UA on admission suggestive of contamination  Continue ceftriaxone       History breast cancer with bone metastasis  Thrombocytopenia  Was on Arimidex in the past was discontinued due to osteoporosis  Had intolerance to tamoxifen  She is currently on Abemeciclib outpatient, this has been considered to have alternate therapies given frequent UTIs as per pharmacy note on 12/10  Patient can follow outpatient with her primary oncologist Dr. Nunez to discuss alternative therapies  Monitor daily CBCs, monitor platelets  Continue Norco  mg q 4 PRN for moderate pain  Continue with intrathecal pump      History of complete heart block S/P PPM    VTE Prophylaxis:  Pharmacologic VTE prophylaxis orders are present.        The patient desires to be as follows:    CODE STATUS:       Full code    Lavon Garrison, who can be contacted at , is the designated person to make medical decisions on the patient's behalf if She is incapable of doing so. This was clarified with patient and/or next of kin on 1/5/2025 during the course of this H&P.    Admission Status:  I believe this patient meets inpatient status.    Expected Length of Stay: 2-3    PDMP and Medication Dispenses via Sidebar reviewed and consistent with patient reported medications.    I discussed the patient's findings and my recommendations with patient and nursing staff.      Signature:     This document has been electronically signed by Carlos Llanes Alvarez, MD on January 6, 2025 04:30 [facility] Hospitalist Team

## 2025-01-06 NOTE — PLAN OF CARE
Goal Outcome Evaluation:              Outcome Evaluation: Pt admitted to the floor

## 2025-01-06 NOTE — CONSULTS
GI CONSULT  NOTE:    Referring Provider:  Radha    Chief complaint: Diarrhea    Subjective .     History of present illness: 64-year-old woman admitted to hospital with nausea vomiting and diarrhea.  She has a history of breast cancer and is on treatment through Dr. Nunez.  About 4 to 5 days ago she began having nausea with vomiting followed by watery diarrhea.  She states that initially she was also having urinary incontinence.  She cannot identify anything that precipitated this.  She lives with her  who has been well.  She saw her primary care physician on Friday who recommended referral to the emergency room but she refused at that time.  The patient notably has been taking Pepto-Bismol and reports having black stools.      Endo History:  None    Past Medical History:  Past Medical History:   Diagnosis Date    Allergic     Bone cancer     METASTATIC BONE; stage 4    Bradycardia     SECONDARY TO ABLATION    Breast cancer 2017    mets to lymph nodes; did not do radiation    Cancer of unknown origin     Compression fx, thoracic spine, open, initial encounter     Coronary artery disease     COVID-19 09/01/2021    Diabetes mellitus     Heart disease, unspecified     Hepatitis C     RESOLVED WITH MEDICATION    Hyperlipidemia     Hypertension     Obesity (BMI 30-39.9) 02/05/2021    Rib fracture     Per patient    Sleep apnea     no machine    Tachycardia     ATRIAL    Type 2 diabetes mellitus 11/2017       Past Surgical History:  Past Surgical History:   Procedure Laterality Date    BACK SURGERY      neck X 2    BREAST RECONSTRUCTION Bilateral     CARDIAC ABLATION       atrial tachycardia x 5 ablations     CARDIAC CATHETERIZATION      CARDIAC ELECTROPHYSIOLOGY PROCEDURE N/A 12/11/2023    Procedure: Pacemaker RV lead insertion with generator change out. [employer];  Surgeon: Steven Hammond MD;  Location: [facility] CATH INVASIVE LOCATION;  Service: Cardiovascular;  Laterality: N/A;    CARDIAC SURGERY      stent  placed in aorta    CARDIAC SURGERY      6 surgeries as baby     CERVICAL FUSION ANTERIOR WITH ARTIFICIAL DISCECTOMY IMPLANTATION N/A 2020    Procedure: C4 VERTEBRECTOMY AND ANTERIOR CERIVCAL DISCECTOMY WITH FUSION OF CERVICAL THREE THROUGH FIVE WITH REMOVAL OF HARDWARE C5-C6;  Surgeon: Mark Kaba MD;  Location: [facility] MAIN OR;  Service: Neurosurgery;  Laterality: N/A;     SECTION      x2    COLONOSCOPY N/A 10/23/2020    Procedure: COLONOSCOPY WITH POLYPECTOMY X6;  Surgeon: Stanley Bourne MD;  Location: [facility] ENDOSCOPY;  Service: Gastroenterology;  Laterality: N/A;  POLYPS, INTERNAL HEMORRHOIDS    ENDOMETRIAL ABLATION      REMOVAL SCAR TISSUE UTERINE    ENDOSCOPY N/A 10/23/2020    Procedure: ESOPHAGOGASTRODUODENOSCOPY WITH BIOPSY X 1 AREA;  Surgeon: Stanley Bourne MD;  Location: [facility] ENDOSCOPY;  Service: Gastroenterology;  Laterality: N/A;  GASTRITIS, ESOPHAGITIS, HIATAL HERNIA    INSERT / REPLACE / REMOVE PACEMAKER      KNEE SURGERY      MASTECTOMY Bilateral     NECK SURGERY      PACEMAKER IMPLANTATION      PAIN PUMP INSERTION/REVISION N/A 2022    Procedure: PAIN PUMP INSERTION AND INTRATHECAL CATHETER PLACEMENT;  Surgeon: Chuck Hunter MD;  Location: [facility] MAIN OR;  Service: Pain Management;  Laterality: N/A;       Social History:  Social History     Tobacco Use    Smoking status: Never     Passive exposure: Never    Smokeless tobacco: Never   Vaping Use    Vaping status: Never Used   Substance Use Topics    Alcohol use: Yes     Comment: drinks rarely    Drug use: Never       Family History:  Family History   Problem Relation Age of Onset    Heart disease Mother     Stroke Mother     Lung cancer Mother     Aneurysm Father     Diabetes Sister     No Known Problems Brother     No Known Problems Brother     Diabetes type I Half-Sister     Thyroid cancer Half-Sister     Cancer Maternal Aunt     Heart attack Sister     Thyroid disease Sister     No Known  Problems Sister        Medications:  (Not in a hospital admission)      Scheduled Meds:[START ON 1/7/2025] cefTRIAXone, 1,000 mg, Intravenous, Q24H  heparin (porcine), 5,000 Units, Subcutaneous, Q8H  insulin lispro, 2-7 Units, Subcutaneous, 4x Daily AC & at Bedtime  metroNIDAZOLE, 500 mg, Intravenous, Q8H  potassium chloride ER, 20 mEq, Oral, Once  sodium chloride, 10 mL, Intravenous, Q12H      Continuous Infusions:sodium chloride, 100 mL/hr, Last Rate: 100 mL/hr (01/06/25 1035)      PRN Meds:.  Calcium Replacement - Follow Nurse / BPA Driven Protocol    dextrose    dextrose    glucagon (human recombinant)    HYDROcodone-acetaminophen    Magnesium Standard Dose Replacement - Follow Nurse / BPA Driven Protocol    nitroglycerin    ondansetron    Phosphorus Replacement - Follow Nurse / BPA Driven Protocol    Potassium Replacement - Follow Nurse / BPA Driven Protocol    sodium chloride    sodium chloride    sodium chloride    ALLERGIES:  Oxycodone, Promethazine, Tape, Adhesive tape, and Flexeril [cyclobenzaprine]    ROS:  Review of Systems    Objective     Vital Signs:   Temp:  [100.1 °F (37.8 °C)] 100.1 °F (37.8 °C)  Heart Rate:  [60-82] 71  Resp:  [19] 19  BP: ()/(46-64) 104/62    Physical Exam:      General Appearance:    Awake and alert, in no acute distress   Head:    Normocephalic, without obvious abnormality, atraumatic   Eyes:            Conjunctivae normal, anicteric sclera   Ears:    Ears appear intact with no abnormalities noted   Throat:   No oral lesions, no thrush, oral mucosa moist   Neck:   No adenopathy, supple, no thyromegaly, no JVD   Lungs:     Clear to auscultation bilaterally, respirations regular, even and unlabored    Heart:    Regular rhythm and normal rate, normal S1 and S2, no            murmur, no gallop, no rub   Chest Wall:    No abnormalities observed   Abdomen:     Normal bowel sounds, soft, non-tender, no rebound or guarding, non-distended, no hepatosplenomegaly    Rectal:      Deferred   Extremities:   Moves all extremities well, no edema, no cyanosis, no             redness   Pulses:   Pulses palpable and equal bilaterally   Skin:   No bleeding, bruising or rash, no jaundice   Lymph nodes:   No palpable adenopathy   Neurologic:   Cranial nerves 2 - 12 grossly intact, no asterixis, sensation intact       Results Review:   I reviewed the patient's labs and imaging.  Lab Results (last 24 hours)       Procedure Component Value Units Date/Time    Scan Slide [[id number]] Collected: 01/06/25 0553    Specimen: Blood Updated: 01/06/25 0723     Scan Slide --     Comment: See Manual Differential Results       Manual Differential [[id number]]  (Abnormal) Collected: 01/06/25 0553    Specimen: Blood Updated: 01/06/25 0723     Neutrophil % 64.0 %      Lymphocyte % 0.0 %      Monocyte % 4.0 %      Bands %  18.0 %      Metamyelocyte % 12.0 %      Atypical Lymphocyte % 2.0 %      Neutrophils Absolute 4.01 10*3/mm3      Lymphocytes Absolute 0.10 10*3/mm3      Monocytes Absolute 0.20 10*3/mm3      Crenated RBC's Slight/1+     Dacrocytes Slight/1+     Poikilocytes Slight/1+     Toxic Granulation Mod/2+     Vacuolated Neutrophils Mod/2+     Platelet Estimate Decreased     Giant Platelets Slight/1+    Path Consult Reflex [[id number]] Collected: 01/06/25 0553    Specimen: Blood Updated: 01/06/25 0723     Pathology Review Yes    CBC Auto Differential [[id number]]  (Abnormal) Collected: 01/06/25 0553    Specimen: Blood Updated: 01/06/25 0723     WBC 4.89 10*3/mm3      RBC 3.82 10*6/mm3      Hemoglobin 11.5 g/dL      Hematocrit 36.4 %      MCV 95.3 fL      MCH 30.1 pg      MCHC 31.6 g/dL      RDW 14.6 %      RDW-SD 51.6 fl      MPV 12.6 fL      Platelets 42 10*3/mm3     Magnesium [[id number]]  (Normal) Collected: 01/06/25 0553    Specimen: Blood Updated: 01/06/25 0619     Magnesium 2.0 mg/dL     Comprehensive Metabolic Panel [[id number]]  (Abnormal) Collected: 01/06/25 0553    Specimen: Blood Updated: 01/06/25 0619      Glucose 116 mg/dL      BUN 52 mg/dL      Creatinine 1.79 mg/dL      Sodium 136 mmol/L      Potassium 3.2 mmol/L      Chloride 106 mmol/L      CO2 16.6 mmol/L      Calcium 7.7 mg/dL      Total Protein 6.2 g/dL      Albumin 3.3 g/dL      ALT (SGPT) 18 U/L      AST (SGOT) 36 U/L      Alkaline Phosphatase 43 U/L      Total Bilirubin 1.4 mg/dL      Globulin 2.9 gm/dL      A/G Ratio 1.1 g/dL      BUN/Creatinine Ratio 29.1     Anion Gap 13.4 mmol/L      eGFR 31.7 mL/min/1.73     Narrative:      GFR Categories in Chronic Kidney Disease (CKD)      GFR Category          GFR (mL/min/1.73)    Interpretation  G1                     90 or greater         Normal or high (1)  G2                      60-89                Mild decrease (1)  G3a                   45-59                Mild to moderate decrease  G3b                   30-44                Moderate to severe decrease  G4                    15-29                Severe decrease  G5                    14 or less           Kidney failure          (1)In the absence of evidence of kidney disease, neither GFR category G1 or G2 fulfill the criteria for CKD.    eGFR calculation 2021 CKD-EPI creatinine equation, which does not include race as a factor    Phosphorus [[id number]]  (Abnormal) Collected: 01/06/25 0553    Specimen: Blood Updated: 01/06/25 0619     Phosphorus 2.4 mg/dL     Respiratory Panel PCR w/COVID-19(SARS-CoV-2) ELISHA/MARQUEZ/HAMMAD/PAD/COR/SHARDA In-House, NP Swab in UTM/VTM, 2 HR TAT - Swab, Nasopharynx [[id number]]  (Normal) Collected: 01/06/25 0024    Specimen: Swab from Nasopharynx Updated: 01/06/25 0130     ADENOVIRUS, PCR Not Detected     Coronavirus 229E Not Detected     Coronavirus HKU1 Not Detected     Coronavirus NL63 Not Detected     Coronavirus OC43 Not Detected     COVID19 Not Detected     Human Metapneumovirus Not Detected     Human Rhinovirus/Enterovirus Not Detected     Influenza A PCR Not Detected     Influenza B PCR Not Detected     Parainfluenza Virus 1 Not  Detected     Parainfluenza Virus 2 Not Detected     Parainfluenza Virus 3 Not Detected     Parainfluenza Virus 4 Not Detected     RSV, PCR Not Detected     Bordetella pertussis pcr Not Detected     Bordetella parapertussis PCR Not Detected     Chlamydophila pneumoniae PCR Not Detected     Mycoplasma pneumo by PCR Not Detected    Narrative:      In the setting of a positive respiratory panel with a viral infection PLUS a negative procalcitonin without other underlying concern for bacterial infection, consider observing off antibiotics or discontinuation of antibiotics and continue supportive care. If the respiratory panel is positive for atypical bacterial infection (Bordetella pertussis, Chlamydophila pneumoniae, or Mycoplasma pneumoniae), consider antibiotic de-escalation to target atypical bacterial infection.    CBC & Differential [[id number]]  (Abnormal) Collected: 01/06/25 0022    Specimen: Blood Updated: 01/06/25 0059    Narrative:      The following orders were created for panel order CBC & Differential.  Procedure                               Abnormality         Status                     ---------                               -----------         ------                     CBC Auto Differential[[id number]]        Abnormal            Final result               Scan Slide[[id number]]                                       Final result                 Please view results for these tests on the individual orders.    CBC Auto Differential [[id number]]  (Abnormal) Collected: 01/06/25 0022    Specimen: Blood Updated: 01/06/25 0059     WBC 11.40 10*3/mm3      RBC 4.13 10*6/mm3      Hemoglobin 12.6 g/dL      Hematocrit 37.8 %      MCV 91.5 fL      MCH 30.5 pg      MCHC 33.3 g/dL      RDW 14.5 %      RDW-SD 48.8 fl      MPV 11.0 fL      Platelets 52 10*3/mm3     Scan Slide [[id number]] Collected: 01/06/25 0022    Specimen: Blood Updated: 01/06/25 0059     Scan Slide --     Comment: See Manual Differential Results        Manual Differential [[id number]]  (Abnormal) Collected: 01/06/25 0022    Specimen: Blood Updated: 01/06/25 0059     Neutrophil % 80.0 %      Lymphocyte % 1.0 %      Monocyte % 1.0 %      Bands %  14.0 %      Metamyelocyte % 2.0 %      Atypical Lymphocyte % 2.0 %      Neutrophils Absolute 10.72 10*3/mm3      Lymphocytes Absolute 0.34 10*3/mm3      Monocytes Absolute 0.11 10*3/mm3      Dacrocytes Slight/1+     Poikilocytes Slight/1+     RBC Fragments Slight/1+     WBC Morphology Normal     Platelet Estimate Decreased     Large Platelets Slight/1+     Giant Platelets Slight/1+    Urinalysis, Microscopic Only - Straight Cath [[id number]]  (Abnormal) Collected: 01/06/25 0035    Specimen: Urine from Straight Cath Updated: 01/06/25 0049     RBC, UA 3-5 /HPF      WBC, UA 21-50 /HPF      Bacteria, UA 1+ /HPF      Squamous Epithelial Cells, UA 13-20 /HPF      Transitional Epithelial Cells, UA 3-6 /HPF      Hyaline Casts, UA 0-2 /LPF      Methodology Automated Microscopy    Urine Culture - Urine, Straight Cath [[id number]] Collected: 01/06/25 0035    Specimen: Urine from Straight Cath Updated: 01/06/25 0049    Comprehensive Metabolic Panel [[id number]]  (Abnormal) Collected: 01/06/25 0022    Specimen: Blood Updated: 01/06/25 0048     Glucose 117 mg/dL      BUN 56 mg/dL      Creatinine 2.05 mg/dL      Sodium 135 mmol/L      Potassium 3.4 mmol/L      Comment: Slight hemolysis detected by analyzer. Result may be falsely elevated.        Chloride 100 mmol/L      CO2 19.5 mmol/L      Calcium 9.0 mg/dL      Total Protein 6.6 g/dL      Albumin 3.2 g/dL      ALT (SGPT) 24 U/L      AST (SGOT) 46 U/L      Alkaline Phosphatase 64 U/L      Total Bilirubin 1.4 mg/dL      Globulin 3.4 gm/dL      A/G Ratio 0.9 g/dL      BUN/Creatinine Ratio 27.3     Anion Gap 15.5 mmol/L      eGFR 27.0 mL/min/1.73     Narrative:      GFR Categories in Chronic Kidney Disease (CKD)      GFR Category          GFR (mL/min/1.73)    Interpretation  G1                      90 or greater         Normal or high (1)  G2                      60-89                Mild decrease (1)  G3a                   45-59                Mild to moderate decrease  G3b                   30-44                Moderate to severe decrease  G4                    15-29                Severe decrease  G5                    14 or less           Kidney failure          (1)In the absence of evidence of kidney disease, neither GFR category G1 or G2 fulfill the criteria for CKD.    eGFR calculation 2021 CKD-EPI creatinine equation, which does not include race as a factor    Urinalysis With Culture If Indicated - Straight Cath [[id number]]  (Abnormal) Collected: 01/06/25 0035    Specimen: Urine from Straight Cath Updated: 01/06/25 0045     Color, UA Dark Yellow     Appearance, UA Cloudy     pH, UA <=5.0     Specific Gravity, UA 1.025     Comment: Result obtained by Refractometer        Glucose,  mg/dL (Trace)     Ketones, UA 15 mg/dL (1+)     Bilirubin, UA Large (3+)     Comment: Confirmation testing is unavailable.  A serum bilirubin is recommended for further assessment.        Blood, UA Negative     Protein, UA >=300 mg/dL (3+)     Leuk Esterase, UA Small (1+)     Nitrite, UA Negative     Urobilinogen, UA 1.0 E.U./dL    Narrative:      In absence of clinical symptoms, the presence of pyuria, bacteria, and/or nitrites on the urinalysis result does not correlate with infection.    POC Lactate [[id number]]  (Normal) Collected: 01/06/25 0037    Specimen: Blood Updated: 01/06/25 0040     Lactate 1.5 mmol/L      Comment: Serial Number: [id number]:  [id number]       Blood Culture - Blood, Arm, Left [[id number]] Collected: 01/06/25 0032    Specimen: Blood from Arm, Left Updated: 01/06/25 0039    Blood Culture - Blood, Arm, Right [[id number]] Collected: 01/06/25 0033    Specimen: Blood from Arm, Right Updated: 01/06/25 0039    [place] Draw [[id number]] Collected: 01/06/25 0022    Specimen: Blood Updated: 01/06/25  0031    Narrative:      The following orders were created for panel order [place] Draw.  Procedure                               Abnormality         Status                     ---------                               -----------         ------                     Green Top (Gel)[[id number]]                                  Final result               Lavender Top[[id number]]                                     Final result               Gold Top - SST[[id number]]                                   Final result               Light Blue Top[[id number]]                                   Final result                 Please view results for these tests on the individual orders.    Green Top (Gel) [[id number]] Collected: 01/06/25 0022    Specimen: Blood Updated: 01/06/25 0031     Extra Tube Hold for add-ons.     Comment: Auto resulted.       Lavender Top [[id number]] Collected: 01/06/25 0022    Specimen: Blood Updated: 01/06/25 0031     Extra Tube hold for add-on     Comment: Auto resulted       Gold Top - SST [[id number]] Collected: 01/06/25 0022    Specimen: Blood Updated: 01/06/25 0031     Extra Tube Hold for add-ons.     Comment: Auto resulted.       Light Blue Top [[id number]] Collected: 01/06/25 0022    Specimen: Blood Updated: 01/06/25 0031     Extra Tube Hold for add-ons.     Comment: Auto resulted               Imaging Results (Last 24 Hours)       Procedure Component Value Units Date/Time    US Renal Bilateral [[id number]] Collected: 01/06/25 0932     Updated: 01/06/25 0935    Narrative:      US RENAL BILATERAL    Date of Exam: 1/6/2025 9:08 AM EST    Indication: JOSE, r/o obstruction.    Comparison: No comparisons available.    Technique: Grayscale and color Doppler ultrasound evaluation of the kidneys and urinary bladder was performed.      Findings:  Right kidney measures 9.8 cm length. Left kidney measures 9.7 cm length. Both kidneys are normal in echogenicity. No hydronephrosis. Grossly unremarkable incompletely  distended urinary bladder      Impression:      Impression:  Normal ultrasound appearance of the kidneys with no evidence of obstructive uropathy        Electronically Signed: Roque Penn    1/6/2025 9:33 AM EST    Workstation ID: [id number]    CT Cervical Spine Without Contrast [[id number]] Collected: 01/06/25 0158     Updated: 01/06/25 0206    Narrative:      CT CERVICAL SPINE WO CONTRAST    Date of Exam: 1/6/2025 1:09 AM EST    Indication: Neck pain after fall.    Comparison: 3/29/2023    Technique: Axial CT images were obtained of the cervical spine without contrast administration.  Sagittal and coronal reconstructions were performed.  Automated exposure control and iterative reconstruction methods were used.    Findings:  Patient is fused from C3-C7 with an anterior plate and screws from C3-C6. Cervical alignment is normal. There is multilevel facet arthropathy. There is disc space narrowing at C7-T1. There is scoliosis at the cervicothoracic junction. No acute cervical   spine fracture is identified. Paraspinal soft tissues are grossly normal.      Impression:      No acute cervical spine fracture. No significant interval change.      Electronically Signed: Brad Foster MD    1/6/2025 2:04 AM EST    Workstation ID: [id number]    CT Facial Bones Without Contrast [[id number]] Collected: 01/06/25 0154     Updated: 01/06/25 0200    Narrative:      CT FACIAL BONES WO CONTRAST    Date of Exam: 1/6/2025 1:09 AM EST    Indication: Fall with bruising.    Comparison: CT orbit 10/12/2018    Technique: Axial CT images were obtained from the inferior aspect of the mandible through the frontal sinuses without contrast administration.  Sagittal and coronal reconstructions were performed.  Automated exposure control and iterative reconstruction   methods were used.    Findings:  Temporomandibular joints demonstrate normal alignment. The mandible is intact. No acute facial bone fracture. No evidence of acute sinus disease. The  globes and orbits appear grossly normal.      Impression:      Negative facial bone CT.        Electronically Signed: Bard Foster MD    1/6/2025 1:58 AM EST    Workstation ID: [id number]    CT Abdomen Pelvis Without Contrast [[id number]] Collected: 01/06/25 0144     Updated: 01/06/25 0158    Narrative:      CT ABDOMEN PELVIS WO CONTRAST    Date of Exam: 1/6/2025 1:09 AM EST    Indication: lower abdominal pain, diarrhea.    Comparison: CT abdomen pelvis August 5, 2024    Technique: Axial CT images were obtained of the abdomen and pelvis without the administration of contrast. Sagittal and coronal reconstructions were performed.  Automated exposure control and iterative reconstruction methods were used.    Findings:  Lung Bases:     There is mild right middle lobe atelectasis. There is bilateral basilar atelectasis. The heart is enlarged. There are postsurgical changes of the pulmonary artery. Heavy coronary artery calcifications are seen.    Liver:  The liver is of normal size. There are no focal liver lesions with noncontrast technique.    Biliary/Gallbladder:    Layering density in the gallbladder may be stones or sludge. The biliary tree is normal.    Spleen:  There is mild splenomegaly.    Pancreas:    Pancreas is normal. There is no evidence of pancreatic mass or peripancreatic fluid.    Kidneys:    There is a nonobstructing stone of the left kidney. There is no hydronephrosis of either kidney.    Adrenals:    Adrenal glands are unremarkable.    Retroperitoneal/Lymph Nodes/Vasculature:    No retroperitoneal adenopathy is identified.    Gastrointestinal/Mesentery:    The bowel loops are non-dilated without wall thickening or mass. The appendix appears within normal limits. No evidence of obstruction. No free air. No mesenteric fluid collections identified.    Bladder:    The bladder is normal.    Genital:     Unremarkable          Bony Structures:     There are old compression fractures of T12 and L1. A benign  hemangioma of L4 is unchanged. There is no acute fracture.        Impression:      Impression:  1.No acute abdominal or pelvic abnormality.  2.Mild splenomegaly.  3.Nonobstructing left renal stone.  4.Layering density in the gallbladder may be stones or sludge.                Electronically Signed: Rocky Heredia MD    1/6/2025 1:56 AM EST    Workstation ID: [id number]    CT Head Without Contrast [[id number]] Collected: 01/06/25 0148     Updated: 01/06/25 0156    Narrative:      CT HEAD WO CONTRAST    HISTORY:  Head injury from fall. Bruising.    TECHNIQUE:  Axial unenhanced head CT with coronal reformats. Radiation dose reduction techniques included automated exposure control or exposure modulation based on body size.    COMPARISON:  11/2/2024, 11/5/2023    FINDINGS:  Ventricular size and configuration are normal. No acute infarct or hemorrhage is identified. There are no masses. There is no skull fracture. Atherosclerotic calcifications are present in the vertebral arteries and carotid siphons. Mild chronic small   vessel ischemic changes are present in the white matter.      Impression:      Senescent changes without acute abnormality.          Electronically Signed: Brad Foster MD    1/6/2025 1:54 AM EST    Workstation ID: [id number]    XR Chest 1 View [[id number]] Collected: 01/06/25 0036     Updated: 01/06/25 0040    Narrative:      XR CHEST 1 VW    Date of Exam: 1/6/2025 12:20 AM EST    Indication: cough, soa    Comparison: 11/2/2024    Findings:  There is a small stent graft in the proximal descending aorta. The heart remains enlarged status post CABG and valve repair. Left-sided multipolar pacer is present. Patient is status post mastectomy with bilateral breast reconstructions. Patient is also   status post cervical spinal fusion. No acute infiltrates are identified. No pneumothorax.      Impression:      1.Cardiomegaly with postoperative changes of CABG and valve repair.  2.No acute infiltrates  identified.        Electronically Signed: Brad Foster MD    1/6/2025 12:38 AM EST    Workstation ID: [id number]               ASSESSMENT:  64-year-old woman with nausea vomiting and diarrhea.  Appropriate stool studies have been ordered and are pending.  However the patient has not had any bowel movements since last evening.  I agree with checking stool for C. difficile and gastrointestinal panel PCR.  Continue IV fluids.  Continue empiric proton pump inhibitor daily.  Continue antiemetics as needed.  Advance diet on Tuesday if she is improving.  We will follow with you.    Principal Problem:    JOSE (acute kidney injury)       PLAN:  1.  Clear liquid diet  2.  Await stool studies  3.  Continue symptomatic treatment and IV fluids    I discussed the patients findings and my recommendations with the patient.  Sudhir Julien MD  01/06/25  11:09 EST

## 2025-01-06 NOTE — PROGRESS NOTES
[facility] MEDICINE SERVICE  DAILY PROGRESS NOTE    NAME: Gladys Garrison  : 1960  MRN: [id number]      LOS: 0 days     PROVIDER OF SERVICE: Khari Casillas MD    Chief Complaint: JOSE (acute kidney injury)    Subjective:     Interval History:  History taken from: patient    No acute overnight events.Patient reported feeling better this morning, diarrhea resolving.    Review of Systems:   Review of Systems    Objective:     Vital Signs  Temp:  [100.1 °F (37.8 °C)] 100.1 °F (37.8 °C)  Heart Rate:  [60-82] 61  Resp:  [19] 19  BP: ()/(46-64) 103/55   Body mass index is 19.92 kg/m².    Physical Exam  Constitutional:       General: She is not in acute distress.     Appearance: She is ill-appearing.   HENT:      Head: Normocephalic.      Nose: Nose normal.      Mouth/Throat:      Mouth: Mucous membranes are dry.      Comments: Oral mucosae dry. Poor dentition.   Eyes:      Extraocular Movements: Extraocular movements intact.      Pupils: Pupils are equal, round, and reactive to light.   Cardiovascular:      Rate and Rhythm: Normal rate and regular rhythm.      Pulses: Normal pulses.      Heart sounds: Murmur heard.   Pulmonary:      Effort: Pulmonary effort is normal. No respiratory distress.      Breath sounds: Normal breath sounds. No wheezing.   Abdominal:      General: Abdomen is flat. There is no distension.      Palpations: Abdomen is soft.      Tenderness: There is no abdominal tenderness. There is no guarding.   Musculoskeletal:      Cervical back: Neck supple.   Skin:     General: Skin is dry.      Capillary Refill: Capillary refill takes less than 2 seconds.      Coloration: Skin is pale.   Neurological:      Mental Status: She is alert and oriented to person, place, and time.   Psychiatric:         Behavior: Behavior normal.               Scheduled Meds   [START ON 2025] cefTRIAXone, 1,000 mg, Intravenous, Q24H  heparin (porcine), 5,000 Units, Subcutaneous, Q8H  insulin lispro,  2-7 Units, Subcutaneous, 4x Daily AC & at Bedtime  metroNIDAZOLE, 500 mg, Intravenous, Q8H  sodium chloride, 10 mL, Intravenous, Q12H       PRN Meds     Calcium Replacement - Follow Nurse / BPA Driven Protocol    dextrose    dextrose    glucagon (human recombinant)    HYDROcodone-acetaminophen    Magnesium Standard Dose Replacement - Follow Nurse / BPA Driven Protocol    nitroglycerin    ondansetron    Phosphorus Replacement - Follow Nurse / BPA Driven Protocol    Potassium Replacement - Follow Nurse / BPA Driven Protocol    sodium chloride    sodium chloride    sodium chloride   Infusions  sodium chloride, 100 mL/hr, Last Rate: 100 mL/hr (01/06/25 0553)          Diagnostic Data    Results from last 7 days   Lab Units 01/06/25 0553   WBC 10*3/mm3 4.89   HEMOGLOBIN g/dL 11.5*   HEMATOCRIT % 36.4   PLATELETS 10*3/mm3 42*   GLUCOSE mg/dL 116*   CREATININE mg/dL 1.79*   BUN mg/dL 52*   SODIUM mmol/L 136   POTASSIUM mmol/L 3.2*   AST (SGOT) U/L 36*   ALT (SGPT) U/L 18   ALK PHOS U/L 43   BILIRUBIN mg/dL 1.4*   ANION GAP mmol/L 13.4       CT Cervical Spine Without Contrast    Result Date: 1/6/2025  No acute cervical spine fracture. No significant interval change. Electronically Signed: Brad Foster MD  1/6/2025 2:04 AM EST  Workstation ID: [id number]    CT Facial Bones Without Contrast    Result Date: 1/6/2025  Negative facial bone CT. Electronically Signed: Brad Foster MD  1/6/2025 1:58 AM EST  Workstation ID: [id number]    CT Abdomen Pelvis Without Contrast    Result Date: 1/6/2025  Impression: 1.No acute abdominal or pelvic abnormality. 2.Mild splenomegaly. 3.Nonobstructing left renal stone. 4.Layering density in the gallbladder may be stones or sludge. Electronically Signed: Rocky Heredia MD  1/6/2025 1:56 AM EST  Workstation ID: [id number]    CT Head Without Contrast    Result Date: 1/6/2025  Senescent changes without acute abnormality. Electronically Signed: Brad Foster MD  1/6/2025 1:54 AM EST  Workstation ID:  [id number]    XR Chest 1 View    Result Date: 1/6/2025  1.Cardiomegaly with postoperative changes of CABG and valve repair. 2.No acute infiltrates identified. Electronically Signed: Brad Foster MD  1/6/2025 12:38 AM EST  Workstation ID: [id number]       I reviewed the patient's new clinical results.    Assessment/Plan:   Gladys Garrison is a 64 y.o. female with a PMH of breast cancer with metastasis to bone, complex cardiac medical history including tetralogy of Fallot, coarctation of aorta, VSD status post repair, coarctation of aorta S/P stent who presented to [facility] on 1/5/2025 with diarrhea since around last Tuesday it is dark color, associated with nausea and multiple episodes of vomiting. States she has not been able to keep anything down, having generalized abdominal cramps, subjective fever and chills, generalized weakness. States she was treated for a UTI last month. States she has not urinated at all in past 3-4 days. Denies hematuria or dysuria, no flank pain. She sustained a fall 2 days ago and hit her head and face, denies LOC. Workup in ER chemistry labs show creat 2.05, K 3.4, Na 135, albumin 3.2, T bili 1.4. CBC labs Hb 12.6, wbc 11.4, platelets 52. ED course notable for hypotension that slightly improved with IVF, temp 100.1. Saturating well on room air. The decision to admit was made.     Hypotension due to volume depletion  Diarrheal illness  Oliguric JOSE due to hypovolemia on the basis of diarrhea and vomiting, NSAIDS use  Melena by history  S/P 30 ml/kg IVF NS bolus in ED, remained borderline hypotensive and received 1 L NS  Given IV albumin 25 g once  Continue  ml/hr  Pending C diff testing, enteric panel  Will continue IV ceftriaxone and flagyl  Serial bladder scans, checking renal US R/o obstructive pathology  Holding losartan due to JOSE  heme-negative stool, no overt GI bleed on exam, Hb stable. Monitor for bleeding  Will start CLD, advance diet as she tolerates  GI  consulted on admission reccs noted     Bacteremia  History of complete heart block S/P PPM  -ID consulted, continue ceftriaxone      Possible UTI  UA on admission suggestive of contamination  Continue ceftriaxone         History breast cancer with bone metastasis  Thrombocytopenia  Was on Arimidex in the past was discontinued due to osteoporosis  Had intolerance to tamoxifen  She is currently on Abemeciclib outpatient, this has been considered to have alternate therapies given frequent UTIs as per pharmacy note on 12/10  Patient can follow outpatient with her primary oncologist Dr. Nunez to discuss alternative therapies  Monitor daily CBCs, monitor platelets  Continue Norco  mg q 4 PRN for moderate pain  Continue with intrathecal pump        1/6:  -Bacteremia, ID consulted, continue ceftriaxone  -JOSE improving, multifactorial, due to Volume depletion and NSAIDs use, will consult nephro  -am Labs     VTE Prophylaxis:  Pharmacologic VTE prophylaxis orders are present.         Code status is   There are no questions and answers to display.       Plan for disposition: ID and nephro reccs    Time: 30 minutes    Part of this note may be an electronic transcription/translation of spoken language to printed text using the Dragon Dictation System.    Signature: Electronically signed by Khari Casillas MD, 01/06/25, 09:26 EST.  [facility], operated by [facility] Hospitalist Team

## 2025-01-06 NOTE — ED NOTES
Nursing report ED to floor  Gladys Garrison  64 y.o.  female    HPI:   Chief Complaint   Patient presents with    Abdominal Pain       Admitting doctor:   Carlos Llanes Alvarez, MD    Admitting diagnosis:   The primary encounter diagnosis was Sepsis, due to unspecified organism, unspecified whether acute organ dysfunction present. Diagnoses of JOSE (acute kidney injury), Acute abdominal pain, Diarrhea of presumed infectious origin, Injury of head, initial encounter, Contusion of face, initial encounter, and Neck sprain, initial encounter were also pertinent to this visit.    Code status:   Current Code Status       Date Active Code Status Order ID Comments User Context       Prior            Allergies:   Oxycodone, Promethazine, Tape, Adhesive tape, and Flexeril [cyclobenzaprine]    Isolation:  Contact Spore     Fall Risk:  Fall Risk Assessment was completed, and patient is at low risk for falls.   Predictive Model Details         32 (Low) Factor Value    Calculated 1/6/2025 11:20 Age 64    Risk of Fall Model Active Peripheral IV Present     Imaging order in this encounter Present     Respiratory Rate 19     Skin Assessment X     Magnesium 2 mg/dL     Number of Distinct Medication Classes administered 5     Calcium 7.7 mg/dL     Diastolic BP 62     Albumin 3.3 g/dL     Creatinine 1.79 mg/dL     Durga Scale not on file     Total Bilirubin 1.4 mg/dL     Cardiac Assessment X     Days after Admission 0.478     Number of administrations of Anti-Coagulants 1     Chloride 106 mmol/L     ALT 18 U/L     Potassium 3.2 mmol/L         Weight:       01/05/25  2351   Weight: 46.3 kg (102 lb)       Intake and Output    Intake/Output Summary (Last 24 hours) at 1/6/2025 1128  Last data filed at 1/6/2025 0900  Gross per 24 hour   Intake 2650 ml   Output 200 ml   Net 2450 ml       Diet:   Dietary Orders (From admission, onward)       Start     Ordered    01/06/25 0849  Diet: Liquid; Clear Liquid; Fluid Consistency: Thin (IDDSI 0)   Diet Effective Now        References:    Diet Order Definitions   Question Answer Comment   Diets: Liquid    Liquid Diet: Clear Liquid    Fluid Consistency: Thin (IDDSI 0)        01/06/25 0849                     Most recent vitals:   Vitals:    01/06/25 0553 01/06/25 0703 01/06/25 0716 01/06/25 0908   BP: 119/64 102/56 103/55 104/62   Pulse:  71 61 71   Resp:       Temp:       TempSrc:       SpO2:  95% 95% 98%   Weight:       Height:           Active LDAs/IV Access:   Lines, Drains & Airways       Active LDAs       Name Placement date Placement time Site Days    Peripheral IV 01/06/25 0021 Anterior;Right Forearm 01/06/25  0021  Forearm  less than 1    Peripheral IV 01/06/25 0022 Anterior;Distal;Right Forearm 01/06/25  0022  Forearm  less than 1                    Skin Condition:   Skin Assessments (last day)       Date/Time Skin WDL    01/06/25 00:03:38 X     Skin WDL: bruising to left temporal lobe at 01/06/25 0003             Labs (abnormal labs have a star):   Labs Reviewed   COMPREHENSIVE METABOLIC PANEL - Abnormal; Notable for the following components:       Result Value    Glucose 117 (*)     BUN 56 (*)     Creatinine 2.05 (*)     Sodium 135 (*)     Potassium 3.4 (*)     CO2 19.5 (*)     Albumin 3.2 (*)     AST (SGOT) 46 (*)     Total Bilirubin 1.4 (*)     BUN/Creatinine Ratio 27.3 (*)     Anion Gap 15.5 (*)     eGFR 27.0 (*)     All other components within normal limits    Narrative:     GFR Categories in Chronic Kidney Disease (CKD)      GFR Category          GFR (mL/min/1.73)    Interpretation  G1                     90 or greater         Normal or high (1)  G2                      60-89                Mild decrease (1)  G3a                   45-59                Mild to moderate decrease  G3b                   30-44                Moderate to severe decrease  G4                    15-29                Severe decrease  G5                    14 or less           Kidney failure          (1)In the absence of  evidence of kidney disease, neither GFR category G1 or G2 fulfill the criteria for CKD.    eGFR calculation 2021 CKD-EPI creatinine equation, which does not include race as a factor   URINALYSIS W/ CULTURE IF INDICATED - Abnormal; Notable for the following components:    Color, UA Dark Yellow (*)     Appearance, UA Cloudy (*)     Glucose,  mg/dL (Trace) (*)     Ketones, UA 15 mg/dL (1+) (*)     Bilirubin, UA Large (3+) (*)     Protein, UA >=300 mg/dL (3+) (*)     Leuk Esterase, UA Small (1+) (*)     All other components within normal limits    Narrative:     In absence of clinical symptoms, the presence of pyuria, bacteria, and/or nitrites on the urinalysis result does not correlate with infection.   CBC WITH AUTO DIFFERENTIAL - Abnormal; Notable for the following components:    WBC 11.40 (*)     Platelets 52 (*)     All other components within normal limits   URINALYSIS, MICROSCOPIC ONLY - Abnormal; Notable for the following components:    RBC, UA 3-5 (*)     WBC, UA 21-50 (*)     Bacteria, UA 1+ (*)     Squamous Epithelial Cells, UA 13-20 (*)     Transitional Epithelial Cells, UA 3-6 (*)     All other components within normal limits   MANUAL DIFFERENTIAL - Abnormal; Notable for the following components:    Neutrophil % 80.0 (*)     Lymphocyte % 1.0 (*)     Monocyte % 1.0 (*)     Bands %  14.0 (*)     Metamyelocyte % 2.0 (*)     Neutrophils Absolute 10.72 (*)     Lymphocytes Absolute 0.34 (*)     All other components within normal limits   CBC WITH AUTO DIFFERENTIAL - Abnormal; Notable for the following components:    Hemoglobin 11.5 (*)     MPV 12.6 (*)     Platelets 42 (*)     All other components within normal limits   COMPREHENSIVE METABOLIC PANEL - Abnormal; Notable for the following components:    Glucose 116 (*)     BUN 52 (*)     Creatinine 1.79 (*)     Potassium 3.2 (*)     CO2 16.6 (*)     Calcium 7.7 (*)     Albumin 3.3 (*)     AST (SGOT) 36 (*)     Total Bilirubin 1.4 (*)     BUN/Creatinine Ratio  29.1 (*)     eGFR 31.7 (*)     All other components within normal limits    Narrative:     GFR Categories in Chronic Kidney Disease (CKD)      GFR Category          GFR (mL/min/1.73)    Interpretation  G1                     90 or greater         Normal or high (1)  G2                      60-89                Mild decrease (1)  G3a                   45-59                Mild to moderate decrease  G3b                   30-44                Moderate to severe decrease  G4                    15-29                Severe decrease  G5                    14 or less           Kidney failure          (1)In the absence of evidence of kidney disease, neither GFR category G1 or G2 fulfill the criteria for CKD.    eGFR calculation 2021 CKD-EPI creatinine equation, which does not include race as a factor   PHOSPHORUS - Abnormal; Notable for the following components:    Phosphorus 2.4 (*)     All other components within normal limits   MANUAL DIFFERENTIAL - Abnormal; Notable for the following components:    Lymphocyte % 0.0 (*)     Monocyte % 4.0 (*)     Bands %  18.0 (*)     Metamyelocyte % 12.0 (*)     Lymphocytes Absolute 0.10 (*)     All other components within normal limits   RESPIRATORY PANEL PCR W/ COVID-19 (SARS-COV-2), NP SWAB IN UTM/VTP, 2 HR TAT - Normal    Narrative:     In the setting of a positive respiratory panel with a viral infection PLUS a negative procalcitonin without other underlying concern for bacterial infection, consider observing off antibiotics or discontinuation of antibiotics and continue supportive care. If the respiratory panel is positive for atypical bacterial infection (Bordetella pertussis, Chlamydophila pneumoniae, or Mycoplasma pneumoniae), consider antibiotic de-escalation to target atypical bacterial infection.   MAGNESIUM - Normal   POC LACTATE - Normal   BLOOD CULTURE   BLOOD CULTURE   GASTROINTESTINAL PANEL, PCR (PREFERRED) DOES NOT INCLUDE CDIFF   CLOSTRIDIOIDES DIFFICILE TOXIN     Narrative:     The following orders were created for panel order Clostridioides difficile Toxin - Stool, Per Rectum.  Procedure                               Abnormality         Status                     ---------                               -----------         ------                     Clostridioides difficile...[[id number]]                                                   Please view results for these tests on the individual orders.   CLOSTRIDIOIDES DIFFICILE EIA   URINE CULTURE   RAINBOW DRAW    Narrative:     The following orders were created for panel order [place] Draw.  Procedure                               Abnormality         Status                     ---------                               -----------         ------                     Green Top (Gel)[[id number]]                                  Final result               Lavender Top[[id number]]                                     Final result               Gold Top - SST[[id number]]                                   Final result               Light Blue Top[[id number]]                                   Final result                 Please view results for these tests on the individual orders.   SCAN SLIDE   SCAN SLIDE   PATH CONSULT REFLEX   POC LACTATE   POCT GLUCOSE FINGERSTICK   POCT GLUCOSE FINGERSTICK   POCT GLUCOSE FINGERSTICK   POCT GLUCOSE FINGERSTICK   POCT GLUCOSE FINGERSTICK   POCT GLUCOSE FINGERSTICK   PATHOLOGY CONSULTATION   CBC AND DIFFERENTIAL    Narrative:     The following orders were created for panel order CBC & Differential.  Procedure                               Abnormality         Status                     ---------                               -----------         ------                     CBC Auto Differential[[id number]]        Abnormal            Final result               Scan Slide[[id number]]                                       Final result                 Please view results for these tests on the individual orders.    GREEN TOP   LAVENDER TOP   GOLD TOP - SST   LIGHT BLUE TOP       LOC: Person, Place, Time, and Situation    Telemetry:  Progressive Care    Cardiac Monitoring Ordered: yes    EKG:   ECG 12 Lead Other; Sepsis   Final Result   HEART RATE=90  bpm   RR Tgmfrjlc=024  ms   OK Vowpbigl=559  ms   P Horizontal Axis=-48  deg   P Front Axis=53  deg   QRSD Ouivttvv=414  ms   QT Isrwlxhe=252  ms   KVuW=052  ms   QRS Axis=216  deg   T Wave Axis=52  deg   - ABNORMAL ECG -   Ventricular-paced rhythm   When compared with ECG of 02-Nov-2024 14:27:59,   Significant rate increase   Electronically Signed By: Christ Young (Kristofer) 2025-01-06 11:24:13   Date and Time of Study:2025-01-06 00:22:34          Medications Given in the ED:   Medications   sodium chloride 0.9 % flush 10 mL (has no administration in time range)   sodium chloride 0.9 % flush 10 mL (10 mL Intravenous Not Given 1/6/25 8340)   sodium chloride 0.9 % flush 10 mL (has no administration in time range)   sodium chloride 0.9 % infusion 40 mL (has no administration in time range)   heparin (porcine) 5000 UNIT/ML injection 5,000 Units (5,000 Units Subcutaneous Given 1/6/25 9910)   nitroglycerin (NITROSTAT) SL tablet 0.4 mg (has no administration in time range)   Potassium Replacement - Follow Nurse / BPA Driven Protocol (has no administration in time range)   Magnesium Standard Dose Replacement - Follow Nurse / BPA Driven Protocol (has no administration in time range)   Phosphorus Replacement - Follow Nurse / BPA Driven Protocol (has no administration in time range)   Calcium Replacement - Follow Nurse / BPA Driven Protocol (has no administration in time range)   sodium chloride 0.9 % infusion (100 mL/hr Intravenous Currently Infusing 1/6/25 1035)   cefTRIAXone (ROCEPHIN) 1,000 mg in sodium chloride 0.9 % 100 mL MBP (has no administration in time range)   metroNIDAZOLE (FLAGYL) IVPB 500 mg (has no administration in time range)   HYDROcodone-acetaminophen (NORCO)  MG per  tablet 1 tablet (has no administration in time range)   dextrose (GLUTOSE) oral gel 15 g (has no administration in time range)   dextrose (D50W) (25 g/50 mL) IV injection 25 g (has no administration in time range)   glucagon (GLUCAGEN) injection 1 mg (has no administration in time range)   insulin lispro (HUMALOG/ADMELOG) injection 2-7 Units ( Subcutaneous Not Given 1/6/25 0634)   ondansetron (ZOFRAN) injection 4 mg (has no administration in time range)   potassium chloride (KLOR-CON M20) CR tablet 20 mEq (has no administration in time range)   sepsis fluid NS 0.9 % bolus 1,389 mL (1,389 mL Intravenous New Bag 1/6/25 0025)   sodium chloride 0.9 % bolus 1,000 mL (0 mL Intravenous Stopped 1/6/25 0530)   cefTRIAXone (ROCEPHIN) 1,000 mg in sodium chloride 0.9 % 100 mL MBP (0 mg Intravenous Stopped 1/6/25 0335)   metroNIDAZOLE (FLAGYL) IVPB 500 mg (0 mg Intravenous Stopped 1/6/25 0424)   albumin human 25 % IV SOLN 25 g (0 g Intravenous Stopped 1/6/25 0530)   sodium chloride 0.9 % bolus 500 mL (0 mL Intravenous Stopped 1/6/25 0536)   potassium chloride (KLOR-CON M20) CR tablet 20 mEq (20 mEq Oral Given 1/6/25 0529)       Imaging results:  US Renal Bilateral    Result Date: 1/6/2025  Impression: Normal ultrasound appearance of the kidneys with no evidence of obstructive uropathy Electronically Signed: Roque Penn  1/6/2025 9:33 AM EST  Workstation ID: [id number]    CT Cervical Spine Without Contrast    Result Date: 1/6/2025  No acute cervical spine fracture. No significant interval change. Electronically Signed: Brad Foster MD  1/6/2025 2:04 AM EST  Workstation ID: [id number]    CT Facial Bones Without Contrast    Result Date: 1/6/2025  Negative facial bone CT. Electronically Signed: Brad Foster MD  1/6/2025 1:58 AM EST  Workstation ID: [id number]    CT Abdomen Pelvis Without Contrast    Result Date: 1/6/2025  Impression: 1.No acute abdominal or pelvic abnormality. 2.Mild splenomegaly. 3.Nonobstructing left renal  stone. 4.Layering density in the gallbladder may be stones or sludge. Electronically Signed: Rocky Heredia MD  1/6/2025 1:56 AM EST  Workstation ID: [id number]    CT Head Without Contrast    Result Date: 1/6/2025  Senescent changes without acute abnormality. Electronically Signed: Brad Foster MD  1/6/2025 1:54 AM EST  Workstation ID: [id number]    XR Chest 1 View    Result Date: 1/6/2025  1.Cardiomegaly with postoperative changes of CABG and valve repair. 2.No acute infiltrates identified. Electronically Signed: Brad Foster MD  1/6/2025 12:38 AM EST  Workstation ID: [id number]     Social issues:   Social History     Socioeconomic History    Marital status: Legally     Number of children: 2   Tobacco Use    Smoking status: Never     Passive exposure: Never    Smokeless tobacco: Never   Vaping Use    Vaping status: Never Used   Substance and Sexual Activity    Alcohol use: Yes     Comment: drinks rarely    Drug use: Never    Sexual activity: Defer       NIH Stroke Scale:  Interval: (not recorded)  1a. Level of Consciousness: (not recorded)  1b. LOC Questions: (not recorded)  1c. LOC Commands: (not recorded)  2. Best Gaze: (not recorded)  3. Visual: (not recorded)  4. Facial Palsy: (not recorded)  5a. Motor Arm, Left: (not recorded)  5b. Motor Arm, Right: (not recorded)  6a. Motor Leg, Left: (not recorded)  6b. Motor Leg, Right: (not recorded)  7. Limb Ataxia: (not recorded)  8. Sensory: (not recorded)  9. Best Language: (not recorded)  10. Dysarthria: (not recorded)  11. Extinction and Inattention (formerly Neglect): (not recorded)    Total (NIH Stroke Scale): (not recorded)     Additional notable assessment information:     Nursing report ED to floor:  2D    Veronica Muniz LPN   01/06/25 11:28 EST

## 2025-01-06 NOTE — ED NOTES
Pt has had generalized lower abd pain and N/V since Wednesday. Hasn't ate in 4 days. Fever off and on last few days. Has had loose stools and not able to urinate.

## 2025-01-06 NOTE — ED PROVIDER NOTES
Subjective   History of Present Illness  64-year-old female with vomiting and diarrhea, subjective fever for the past 4 to 5 days presents for evaluation.  Patient was seen by her family doctor on Friday and referred to the ER though patient refused at that time.  Patient complains also moderate lower abdominal pain and decreased urinary output.  Patient also reports intermittent black stools.  Patient on review of system also has a mild cough and some occasional dyspnea but no chest pain.  Patient states on Saturday she got out of bed and felt lightheaded and fell to the floor hitting her left face and head, no LOC.  Patient has moderate left facial pain, neck pain      Review of Systems   Constitutional:  Positive for fatigue and fever.   Gastrointestinal:  Positive for abdominal pain, diarrhea, nausea and vomiting.   Genitourinary:  Positive for decreased urine volume.       Past Medical History:   Diagnosis Date    Allergic     Bone cancer     METASTATIC BONE; stage 4    Bradycardia     SECONDARY TO ABLATION    Breast cancer 2017    mets to lymph nodes; did not do radiation    Cancer of unknown origin     Compression fx, thoracic spine, open, initial encounter     Coronary artery disease     COVID-19 09/01/2021    Diabetes mellitus     Heart disease, unspecified     Hepatitis C     RESOLVED WITH MEDICATION    Hyperlipidemia     Hypertension     Obesity (BMI 30-39.9) 02/05/2021    Rib fracture     Per patient    Sleep apnea     no machine    Tachycardia     ATRIAL    Type 2 diabetes mellitus 11/2017       Allergies   Allergen Reactions    Oxycodone Nausea And Vomiting    Promethazine Other (See Comments)     Hyperactive mean    Tape Other (See Comments)     .blisters      Adhesive Tape Rash    Flexeril [Cyclobenzaprine] Rash       Past Surgical History:   Procedure Laterality Date    BACK SURGERY      neck X 2    BREAST RECONSTRUCTION Bilateral     CARDIAC ABLATION       atrial tachycardia x 5 ablations      CARDIAC CATHETERIZATION      CARDIAC ELECTROPHYSIOLOGY PROCEDURE N/A 2023    Procedure: Pacemaker RV lead insertion with generator change out. [employer];  Surgeon: Steven Hammond MD;  Location: [facility] CATH INVASIVE LOCATION;  Service: Cardiovascular;  Laterality: N/A;    CARDIAC SURGERY      stent placed in aorta    CARDIAC SURGERY      6 surgeries as baby     CERVICAL FUSION ANTERIOR WITH ARTIFICIAL DISCECTOMY IMPLANTATION N/A 2020    Procedure: C4 VERTEBRECTOMY AND ANTERIOR CERIVCAL DISCECTOMY WITH FUSION OF CERVICAL THREE THROUGH FIVE WITH REMOVAL OF HARDWARE C5-C6;  Surgeon: Mark Kaba MD;  Location: [facility] MAIN OR;  Service: Neurosurgery;  Laterality: N/A;     SECTION      x2    COLONOSCOPY N/A 10/23/2020    Procedure: COLONOSCOPY WITH POLYPECTOMY X6;  Surgeon: Stanley Bourne MD;  Location: [facility] ENDOSCOPY;  Service: Gastroenterology;  Laterality: N/A;  POLYPS, INTERNAL HEMORRHOIDS    ENDOMETRIAL ABLATION      REMOVAL SCAR TISSUE UTERINE    ENDOSCOPY N/A 10/23/2020    Procedure: ESOPHAGOGASTRODUODENOSCOPY WITH BIOPSY X 1 AREA;  Surgeon: Stanley Bourne MD;  Location: [facility] ENDOSCOPY;  Service: Gastroenterology;  Laterality: N/A;  GASTRITIS, ESOPHAGITIS, HIATAL HERNIA    INSERT / REPLACE / REMOVE PACEMAKER      KNEE SURGERY      MASTECTOMY Bilateral     NECK SURGERY      PACEMAKER IMPLANTATION      PAIN PUMP INSERTION/REVISION N/A 2022    Procedure: PAIN PUMP INSERTION AND INTRATHECAL CATHETER PLACEMENT;  Surgeon: Chuck Hunter MD;  Location: [facility] MAIN OR;  Service: Pain Management;  Laterality: N/A;       Family History   Problem Relation Age of Onset    Heart disease Mother     Stroke Mother     Lung cancer Mother     Aneurysm Father     Diabetes Sister     No Known Problems Brother     No Known Problems Brother     Diabetes type I Half-Sister     Thyroid cancer Half-Sister     Cancer Maternal Aunt     Heart attack Sister     Thyroid  disease Sister     No Known Problems Sister        Social History     Socioeconomic History    Marital status: Legally     Number of children: 2   Tobacco Use    Smoking status: Never     Passive exposure: Never    Smokeless tobacco: Never   Vaping Use    Vaping status: Never Used   Substance and Sexual Activity    Alcohol use: Yes     Comment: drinks rarely    Drug use: Never    Sexual activity: Defer           Objective   Physical Exam  Constitutional:       General: She is not in acute distress.  HENT:      Head:      Comments: Ecchymosis to left maxilla with associated tenderness     Mouth/Throat:      Mouth: Mucous membranes are dry.      Pharynx: Oropharynx is clear.   Eyes:      Extraocular Movements: Extraocular movements intact.      Pupils: Pupils are equal, round, and reactive to light.   Neck:      Comments: Mild pain with range of motion  Cardiovascular:      Rate and Rhythm: Normal rate and regular rhythm.      Heart sounds: Murmur heard.   Pulmonary:      Effort: Pulmonary effort is normal.      Breath sounds: Normal breath sounds.   Abdominal:      General: Bowel sounds are normal. There is no distension.      Palpations: Abdomen is soft.      Comments: Mild lower abdominal tenderness, no rebound or guarding   Genitourinary:     Comments: Stool brown, heme-negative  Skin:     General: Skin is warm and dry.      Capillary Refill: Capillary refill takes less than 2 seconds.   Neurological:      General: No focal deficit present.      Mental Status: She is alert and oriented to person, place, and time.   Psychiatric:         Mood and Affect: Mood normal.         Behavior: Behavior normal.         Procedures           ED Course                                                       Medical Decision Making  Patient appears well on repeat evaluation, blood pressure improved, patient significantly dehydrated presumably from diarrhea and vomiting, no acute findings on CT abdomen pelvis, no vomiting  or diarrhea in the emergency department, hemoglobin stable with heme-negative stool.  No acute findings on imaging of the head/face/neck, will culture stool when available continue to hydrate, admit to the hospital.  Case discussed with Dr. Blanco with hospital service, agrees to plan    Problems Addressed:  Acute abdominal pain: complicated acute illness or injury  JOSE (acute kidney injury): complicated acute illness or injury  Contusion of face, initial encounter: complicated acute illness or injury  Diarrhea of presumed infectious origin: complicated acute illness or injury  Injury of head, initial encounter: complicated acute illness or injury  Neck sprain, initial encounter: complicated acute illness or injury  Sepsis, due to unspecified organism, unspecified whether acute organ dysfunction present: complicated acute illness or injury    Amount and/or Complexity of Data Reviewed  External Data Reviewed: labs.     Details: BUN 25, creatinine 1.08 December 3, 2024  Labs: ordered.     Details: Creatinine 2  Radiology: ordered.  ECG/medicine tests: ordered and independent interpretation performed.     Details: EKG interpretation: Paced rhythm, rate 90    Risk  Prescription drug management.  Decision regarding hospitalization.        Final diagnoses:   Sepsis, due to unspecified organism, unspecified whether acute organ dysfunction present   JOSE (acute kidney injury)   Acute abdominal pain   Diarrhea of presumed infectious origin   Injury of head, initial encounter   Contusion of face, initial encounter   Neck sprain, initial encounter       ED Disposition  ED Disposition       ED Disposition   Decision to Admit    Condition   --    Comment   Level of Care: Progressive Care [20]   Diagnosis: JOSE (acute kidney injury) [520919]   Admitting Physician: LLANES ALVAREZ, CARLOS [[id number]]   Certification: I Certify That Inpatient Hospital Services Are Medically Necessary For Greater Than 2 Midnights                 No  follow-up provider specified.       Medication List      No changes were made to your prescriptions during this visit.            Christ Young MD  01/06/25 0438

## 2025-01-07 ENCOUNTER — INPATIENT HOSPITAL (OUTPATIENT)
Dept: URBAN - METROPOLITAN AREA HOSPITAL 84 | Facility: HOSPITAL | Age: 65
End: 2025-01-07
Payer: MEDICARE

## 2025-01-07 ENCOUNTER — APPOINTMENT (OUTPATIENT)
Dept: CARDIOLOGY | Facility: HOSPITAL | Age: 65
DRG: 314 | End: 2025-01-07
Payer: MEDICARE

## 2025-01-07 DIAGNOSIS — R19.7 DIARRHEA, UNSPECIFIED: ICD-10-CM

## 2025-01-07 DIAGNOSIS — R11.2 NAUSEA WITH VOMITING, UNSPECIFIED: ICD-10-CM

## 2025-01-07 LAB
ALBUMIN SERPL-MCNC: 2.8 G/DL (ref 3.5–5.2)
ALBUMIN/GLOB SERPL: 1.1 G/DL
ALP SERPL-CCNC: 39 U/L (ref 39–117)
ALT SERPL W P-5'-P-CCNC: 17 U/L (ref 1–33)
ANION GAP SERPL CALCULATED.3IONS-SCNC: 10.1 MMOL/L (ref 5–15)
ANISOCYTOSIS BLD QL: ABNORMAL
ARTERIAL PATENCY WRIST A: POSITIVE
AST SERPL-CCNC: 36 U/L (ref 1–32)
ATMOSPHERIC PRESS: ABNORMAL MM[HG]
BACTERIA SPEC AEROBE CULT: ABNORMAL
BASE EXCESS BLDA CALC-SCNC: -6.5 MMOL/L (ref 0–3)
BDY SITE: ABNORMAL
BILIRUB SERPL-MCNC: 0.7 MG/DL (ref 0–1.2)
BUN SERPL-MCNC: 50 MG/DL (ref 8–23)
BUN/CREAT SERPL: 45.9 (ref 7–25)
BURR CELLS BLD QL SMEAR: ABNORMAL
CALCIUM SPEC-SCNC: 7.6 MG/DL (ref 8.6–10.5)
CHLORIDE SERPL-SCNC: 108 MMOL/L (ref 98–107)
CO2 BLDA-SCNC: 18.7 MMOL/L (ref 22–29)
CO2 SERPL-SCNC: 16.9 MMOL/L (ref 22–29)
CREAT SERPL-MCNC: 1.09 MG/DL (ref 0.57–1)
CREAT UR-MCNC: 78.3 MG/DL
DEPRECATED RDW RBC AUTO: 52.3 FL (ref 37–54)
EGFRCR SERPLBLD CKD-EPI 2021: 57.6 ML/MIN/1.73
EOSINOPHIL # BLD MANUAL: 0.05 10*3/MM3 (ref 0–0.4)
EOSINOPHIL NFR BLD MANUAL: 1 % (ref 0.3–6.2)
ERYTHROCYTE [DISTWIDTH] IN BLOOD BY AUTOMATED COUNT: 14.9 % (ref 12.3–15.4)
GLOBULIN UR ELPH-MCNC: 2.6 GM/DL
GLUCOSE BLDC GLUCOMTR-MCNC: 101 MG/DL (ref 70–105)
GLUCOSE BLDC GLUCOMTR-MCNC: 105 MG/DL (ref 70–105)
GLUCOSE BLDC GLUCOMTR-MCNC: 123 MG/DL (ref 70–105)
GLUCOSE BLDC GLUCOMTR-MCNC: 127 MG/DL (ref 70–105)
GLUCOSE SERPL-MCNC: 97 MG/DL (ref 65–99)
HCO3 BLDA-SCNC: 17.8 MMOL/L (ref 21–28)
HCT VFR BLD AUTO: 34.1 % (ref 34–46.6)
HEMODILUTION: NO
HGB BLD-MCNC: 10.7 G/DL (ref 12–15.9)
INHALED O2 CONCENTRATION: 28 %
LAB AP CASE REPORT: NORMAL
LYMPHOCYTES # BLD MANUAL: 0.84 10*3/MM3 (ref 0.7–3.1)
LYMPHOCYTES NFR BLD MANUAL: 3 % (ref 5–12)
MAGNESIUM SERPL-MCNC: 2.1 MG/DL (ref 1.6–2.4)
MCH RBC QN AUTO: 29.9 PG (ref 26.6–33)
MCHC RBC AUTO-ENTMCNC: 31.4 G/DL (ref 31.5–35.7)
MCV RBC AUTO: 95.3 FL (ref 79–97)
METAMYELOCYTES NFR BLD MANUAL: 1 % (ref 0–0)
MODALITY: ABNORMAL
MONOCYTES # BLD: 0.14 10*3/MM3 (ref 0.1–0.9)
NEUTROPHILS # BLD AUTO: 3.57 10*3/MM3 (ref 1.7–7)
NEUTROPHILS NFR BLD MANUAL: 63 % (ref 42.7–76)
NEUTS BAND NFR BLD MANUAL: 14 % (ref 0–5)
PATH REPORT.FINAL DX SPEC: NORMAL
PCO2 BLDA: 30.8 MM HG (ref 35–48)
PH BLDA: 7.37 PH UNITS (ref 7.35–7.45)
PHOSPHATE SERPL-MCNC: 2 MG/DL (ref 2.5–4.5)
PLATELET # BLD AUTO: 36 10*3/MM3 (ref 140–450)
PMV BLD AUTO: ABNORMAL FL
PO2 BLD: 342 MM[HG] (ref 0–500)
PO2 BLDA: 95.8 MM HG (ref 83–108)
POIKILOCYTOSIS BLD QL SMEAR: ABNORMAL
POTASSIUM SERPL-SCNC: 3.1 MMOL/L (ref 3.5–5.2)
PROT ?TM UR-MCNC: 33.6 MG/DL
PROT SERPL-MCNC: 5.4 G/DL (ref 6–8.5)
PROT/CREAT UR: 429.1 MG/G CREA (ref 0–200)
RBC # BLD AUTO: 3.58 10*6/MM3 (ref 3.77–5.28)
SAO2 % BLDCOA: 97.4 % (ref 94–98)
SCAN SLIDE: NORMAL
SMALL PLATELETS BLD QL SMEAR: ABNORMAL
SODIUM SERPL-SCNC: 135 MMOL/L (ref 136–145)
TOXIC GRANULATION: ABNORMAL
VARIANT LYMPHS NFR BLD MANUAL: 1 % (ref 0–5)
VARIANT LYMPHS NFR BLD MANUAL: 17 % (ref 19.6–45.3)
WBC NRBC COR # BLD AUTO: 4.64 10*3/MM3 (ref 3.4–10.8)

## 2025-01-07 PROCEDURE — 25010000002 METRONIDAZOLE 500 MG/100ML SOLUTION: Performed by: STUDENT IN AN ORGANIZED HEALTH CARE EDUCATION/TRAINING PROGRAM

## 2025-01-07 PROCEDURE — 94761 N-INVAS EAR/PLS OXIMETRY MLT: CPT

## 2025-01-07 PROCEDURE — 85007 BL SMEAR W/DIFF WBC COUNT: CPT | Performed by: STUDENT IN AN ORGANIZED HEALTH CARE EDUCATION/TRAINING PROGRAM

## 2025-01-07 PROCEDURE — 84100 ASSAY OF PHOSPHORUS: CPT | Performed by: STUDENT IN AN ORGANIZED HEALTH CARE EDUCATION/TRAINING PROGRAM

## 2025-01-07 PROCEDURE — 93306 TTE W/DOPPLER COMPLETE: CPT

## 2025-01-07 PROCEDURE — 82948 REAGENT STRIP/BLOOD GLUCOSE: CPT

## 2025-01-07 PROCEDURE — 99222 1ST HOSP IP/OBS MODERATE 55: CPT | Performed by: INTERNAL MEDICINE

## 2025-01-07 PROCEDURE — 82803 BLOOD GASES ANY COMBINATION: CPT | Performed by: INTERNAL MEDICINE

## 2025-01-07 PROCEDURE — 85025 COMPLETE CBC W/AUTO DIFF WBC: CPT | Performed by: STUDENT IN AN ORGANIZED HEALTH CARE EDUCATION/TRAINING PROGRAM

## 2025-01-07 PROCEDURE — 36600 WITHDRAWAL OF ARTERIAL BLOOD: CPT | Performed by: INTERNAL MEDICINE

## 2025-01-07 PROCEDURE — 94799 UNLISTED PULMONARY SVC/PX: CPT

## 2025-01-07 PROCEDURE — 80053 COMPREHEN METABOLIC PANEL: CPT | Performed by: STUDENT IN AN ORGANIZED HEALTH CARE EDUCATION/TRAINING PROGRAM

## 2025-01-07 PROCEDURE — 82570 ASSAY OF URINE CREATININE: CPT | Performed by: HOSPITALIST

## 2025-01-07 PROCEDURE — 25010000002 SULFUR HEXAFLUORIDE MICROSPH 60.7-25 MG RECONSTITUTED SUSPENSION: Performed by: INTERNAL MEDICINE

## 2025-01-07 PROCEDURE — 93306 TTE W/DOPPLER COMPLETE: CPT | Performed by: INTERNAL MEDICINE

## 2025-01-07 PROCEDURE — 84156 ASSAY OF PROTEIN URINE: CPT | Performed by: HOSPITALIST

## 2025-01-07 PROCEDURE — 25010000002 CEFTRIAXONE PER 250 MG: Performed by: STUDENT IN AN ORGANIZED HEALTH CARE EDUCATION/TRAINING PROGRAM

## 2025-01-07 PROCEDURE — 82948 REAGENT STRIP/BLOOD GLUCOSE: CPT | Performed by: STUDENT IN AN ORGANIZED HEALTH CARE EDUCATION/TRAINING PROGRAM

## 2025-01-07 PROCEDURE — 25010000002 HEPARIN (PORCINE) PER 1000 UNITS: Performed by: STUDENT IN AN ORGANIZED HEALTH CARE EDUCATION/TRAINING PROGRAM

## 2025-01-07 PROCEDURE — 83735 ASSAY OF MAGNESIUM: CPT | Performed by: STUDENT IN AN ORGANIZED HEALTH CARE EDUCATION/TRAINING PROGRAM

## 2025-01-07 PROCEDURE — 99232 SBSQ HOSP IP/OBS MODERATE 35: CPT | Performed by: NURSE PRACTITIONER

## 2025-01-07 RX ORDER — POTASSIUM CHLORIDE 1500 MG/1
40 TABLET, EXTENDED RELEASE ORAL EVERY 4 HOURS
Status: DISPENSED | OUTPATIENT
Start: 2025-01-07 | End: 2025-01-07

## 2025-01-07 RX ADMIN — POTASSIUM CHLORIDE 40 MEQ: 1500 TABLET, EXTENDED RELEASE ORAL at 08:31

## 2025-01-07 RX ADMIN — POTASSIUM CHLORIDE 40 MEQ: 1500 TABLET, EXTENDED RELEASE ORAL at 13:25

## 2025-01-07 RX ADMIN — Medication 10 ML: at 21:50

## 2025-01-07 RX ADMIN — PANTOPRAZOLE SODIUM 40 MG: 40 TABLET, DELAYED RELEASE ORAL at 05:01

## 2025-01-07 RX ADMIN — Medication 10 ML: at 08:31

## 2025-01-07 RX ADMIN — Medication 2 PACKET: at 13:25

## 2025-01-07 RX ADMIN — METRONIDAZOLE 500 MG: 5 INJECTION, SOLUTION INTRAVENOUS at 05:01

## 2025-01-07 RX ADMIN — METRONIDAZOLE 500 MG: 5 INJECTION, SOLUTION INTRAVENOUS at 13:25

## 2025-01-07 RX ADMIN — CEFTRIAXONE 2000 MG: 2 INJECTION, POWDER, FOR SOLUTION INTRAMUSCULAR; INTRAVENOUS at 08:30

## 2025-01-07 RX ADMIN — HEPARIN SODIUM 5000 UNITS: 5000 INJECTION INTRAVENOUS; SUBCUTANEOUS at 13:25

## 2025-01-07 RX ADMIN — HYDROCODONE BITARTRATE AND ACETAMINOPHEN 1 TABLET: 10; 325 TABLET ORAL at 04:59

## 2025-01-07 RX ADMIN — HEPARIN SODIUM 5000 UNITS: 5000 INJECTION INTRAVENOUS; SUBCUTANEOUS at 05:01

## 2025-01-07 RX ADMIN — HEPARIN SODIUM 5000 UNITS: 5000 INJECTION INTRAVENOUS; SUBCUTANEOUS at 21:50

## 2025-01-07 RX ADMIN — SULFUR HEXAFLUORIDE 2 ML: KIT at 16:45

## 2025-01-07 NOTE — CASE MANAGEMENT/SOCIAL WORK
Discharge Planning Assessment   [place]     Patient Name: Gladys Garrison  MRN: [id number]  Today's Date: 1/7/2025    Admit Date: 1/5/2025    Plan: DC PLAN:Routine home       Discharge Needs Assessment       Row Name 01/07/25 1025       Living Environment    People in Home significant other    Current Living Arrangements home    Potentially Unsafe Housing Conditions none    In the past 12 months has the electric, gas, oil, or water company threatened to shut off services in your home? No    Primary Care Provided by self    Provides Primary Care For no one    Family Caregiver if Needed spouse    Quality of Family Relationships helpful;involved;supportive    Able to Return to Prior Arrangements yes       Resource/Environmental Concerns    Resource/Environmental Concerns none    Transportation Concerns none       Transportation Needs    In the past 12 months, has lack of transportation kept you from medical appointments or from getting medications? no    In the past 12 months, has lack of transportation kept you from meetings, work, or from getting things needed for daily living? No       Food Insecurity    Within the past 12 months, you worried that your food would run out before you got the money to buy more. Never true    Within the past 12 months, the food you bought just didn't last and you didn't have money to get more. Never true       Transition Planning    Patient/Family Anticipates Transition to home with family    Patient/Family Anticipated Services at Transition none    Transportation Anticipated car, drives self;family or friend will provide       Discharge Needs Assessment    Readmission Within the Last 30 Days no previous admission in last 30 days    Equipment Currently Used at Home none    Anticipated Changes Related to Illness none    Equipment Needed After Discharge none                   Discharge Plan       Row Name 01/07/25 1025       Plan    Plan DC PLAN:Routine home    Patient/Family in  Agreement with Plan yes    Plan Comments CM Met with patient at bedside, from routine home with boyfriend. Independent with ADL's no DME. PCP is Travis. Pharmacy is [facility]. Able to afford medications and denies any issues with food or utilities. Denies any transportation issues, still drives. Denies any HHC or SNF needs, Denies any concerns about return home.                  Continued Care and Services - Admitted Since 1/5/2025    No active coordination exists for this encounter.       Selected Continued Care - Episodes Includes continued care and service providers with selected services from the active episodes listed below      Oncology- External Fill Episode start date: 10/10/2023   There are no active outsourced providers for this episode.                 Expected Discharge Date and Time       Expected Discharge Date Expected Discharge Time    Jan 7, 2025            Demographic Summary       Row Name 01/07/25 1024       General Information    Admission Type inpatient    Arrived From emergency department    Required Notices Provided Important Message from Medicare    Referral Source admission list    Reason for Consult discharge planning    Preferred Language English       Contact Information    Permission Granted to Share Info With     Contact Information Obtained for                    Functional Status       Row Name 01/07/25 1025       Functional Status    Usual Activity Tolerance excellent    Current Activity Tolerance excellent       Physical Activity    On average, how many days per week do you engage in moderate to strenuous exercise (like a brisk walk)? 0 days    On average, how many minutes do you engage in exercise at this level? 0 min    Number of minutes of exercise per week 0       Assessment of Health Literacy    How often do you have someone help you read hospital materials? Sometimes    How often do you have problems learning about your medical condition because of  difficulty understanding written information? Sometimes    How often do you have a problem understanding what is told to you about your medical condition? Sometimes    How confident are you filling out medical forms by yourself? Somewhat    Health Literacy Moderate       Functional Status, IADL    Medications independent    Meal Preparation independent    Housekeeping independent    Laundry independent    Shopping independent       Mental Status    General Appearance WDL WDL       Mental Status Summary    Recent Changes in Mental Status/Cognitive Functioning no changes                            Patient Forms       Row Name 01/07/25 1024       Patient Forms    Important Message from Medicare (IMM) Delivered  IMM 1/6/25 per registration    Delivered to Patient    Method of delivery In person                  Maribell Dunbar RN   Case Management  [phone]

## 2025-01-07 NOTE — CONSULTS
Cardiology [place]  Cardiology consult note  Stanley Lopez MD, PhD      Subjective:     Encounter Date:01/05/2025      Patient ID: Gladys Garrison is a 64 y.o. female.    Chief Complaint: diarrhea, nausea, generalized weakness  Cardiology Consult: request for GREGORY    Referring Physician: Dr. Pfeiffer  Seen and examined by me agree with narrative as discussed with nurse practitioner after face-to-face encounter scruff findings below greater than 50% of total encounter, medical decision making performed by me    HPI:  Gladys Garrison is a 64 y.o. female who presents with nausea, diarrhea, generalized weakness. Ms. Garrison is a patient of Dr. Lopez. 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 She underwent device exchange with Medtronic generator December 2023, Medtronic leads were confirmed for RV and RA leads which were placed back in 2007.  She also has metastatic breast cancer diagnosed 2017 with bone mets diagnosed in 2021, history of bilateral mastectomy.  Under therapy evaluation also pain management from oncology standpoint.   She also history of COVID 2021 she was admitted recently in February 2023 with chest pain atypical nature thought to be secondary to metastatic disease.   She has no history of obstructive CAD or coronary intervention.      Last 2D echo March 2023 revealed normal EF 55 to 60% with grade 1 diastolic dysfunction, severely reduced RV systolic function with moderately dilated RV and RA, moderate bileaflet mitral valve thickening with severe TR, mildly elevated pulmonary pressures 35-45, trivial pericardial effusion.      She presents this admission with nausea, diarrhea, weakness. She is noted to have group B  streptococcus bacteremia with unclear source. ID has requested a GREGORY from cardiology as patient has PPM in place and h/o cardiac surgery as child for CHD. She remains on IV antibiotics. Will arrange GREGORY.     Historical data copied forward from previous encounters in EMR is unchanged  Review of systems otherwise negative x 14 point review of systems except as mentioned above    EKG shows Paced V rhythm      Past Medical History:   Diagnosis Date    Allergic     Bone cancer     METASTATIC BONE; stage 4    Bradycardia     SECONDARY TO ABLATION    Breast cancer     mets to lymph nodes; did not do radiation    Cancer of unknown origin     Compression fx, thoracic spine, open, initial encounter     Coronary artery disease     COVID-19 2021    Diabetes mellitus     Heart disease, unspecified     Hepatitis C     RESOLVED WITH MEDICATION    Hyperlipidemia     Hypertension     Obesity (BMI 30-39.9) 2021    Rib fracture     Per patient    Sleep apnea     no machine    Tachycardia     ATRIAL    Type 2 diabetes mellitus 2017       Past Surgical History:   Procedure Laterality Date    BACK SURGERY      neck X 2    BREAST RECONSTRUCTION Bilateral     CARDIAC ABLATION       atrial tachycardia x 5 ablations     CARDIAC CATHETERIZATION      CARDIAC ELECTROPHYSIOLOGY PROCEDURE N/A 2023    Procedure: Pacemaker RV lead insertion with generator change out. [employer]ettr[employer];  Surgeon: Steven Hammond MD;  Location: [facility] CATH INVASIVE LOCATION;  Service: Cardiovascular;  Laterality: N/A;    CARDIAC SURGERY      stent placed in aorta    CARDIAC SURGERY      6 surgeries as baby     CERVICAL FUSION ANTERIOR WITH ARTIFICIAL DISCECTOMY IMPLANTATION N/A 2020    Procedure: C4 VERTEBRECTOMY AND ANTERIOR CERIVCAL DISCECTOMY WITH FUSION OF CERVICAL THREE THROUGH FIVE WITH REMOVAL OF HARDWARE C5-C6;  Surgeon: Mark Kaba MD;  Location: [facility] MAIN OR;  Service: Neurosurgery;  Laterality: N/A;     SECTION       x2    COLONOSCOPY N/A 10/23/2020    Procedure: COLONOSCOPY WITH POLYPECTOMY X6;  Surgeon: Stanley Bourne MD;  Location: [facility] ENDOSCOPY;  Service: Gastroenterology;  Laterality: N/A;  POLYPS, INTERNAL HEMORRHOIDS    ENDOMETRIAL ABLATION      REMOVAL SCAR TISSUE UTERINE    ENDOSCOPY N/A 10/23/2020    Procedure: ESOPHAGOGASTRODUODENOSCOPY WITH BIOPSY X 1 AREA;  Surgeon: Stanley Bourne MD;  Location: [facility] ENDOSCOPY;  Service: Gastroenterology;  Laterality: N/A;  GASTRITIS, ESOPHAGITIS, HIATAL HERNIA    INSERT / REPLACE / REMOVE PACEMAKER      KNEE SURGERY  2000    MASTECTOMY Bilateral     NECK SURGERY      PACEMAKER IMPLANTATION      PAIN PUMP INSERTION/REVISION N/A 04/05/2022    Procedure: PAIN PUMP INSERTION AND INTRATHECAL CATHETER PLACEMENT;  Surgeon: Chuck Hunter MD;  Location: [facility] MAIN OR;  Service: Pain Management;  Laterality: N/A;       Family History   Problem Relation Age of Onset    Heart disease Mother     Stroke Mother     Lung cancer Mother     Aneurysm Father     Diabetes Sister     No Known Problems Brother     No Known Problems Brother     Diabetes type I Half-Sister     Thyroid cancer Half-Sister     Cancer Maternal Aunt     Heart attack Sister     Thyroid disease Sister     No Known Problems Sister        Social History     Socioeconomic History    Marital status: Legally     Number of children: 2   Tobacco Use    Smoking status: Never     Passive exposure: Never    Smokeless tobacco: Never   Vaping Use    Vaping status: Never Used   Substance and Sexual Activity    Alcohol use: Yes     Comment: drinks rarely    Drug use: Never    Sexual activity: Defer         Allergies   Allergen Reactions    Oxycodone Nausea And Vomiting    Promethazine Other (See Comments)     Hyperactive mean    Tape Other (See Comments)     .blisters      Adhesive Tape Rash    Flexeril [Cyclobenzaprine] Rash       Current Medications:   Scheduled Meds:cefTRIAXone, 2,000 mg,  "Intravenous, Q24H  heparin (porcine), 5,000 Units, Subcutaneous, Q8H  insulin lispro, 2-7 Units, Subcutaneous, 4x Daily AC & at Bedtime  pantoprazole, 40 mg, Oral, Q AM  potassium chloride ER, 40 mEq, Oral, Q4H  sodium chloride, 10 mL, Intravenous, Q12H      Continuous Infusions:     Review of Systems   Constitutional: Positive for malaise/fatigue. Negative for chills and diaphoresis.   Cardiovascular:  Negative for chest pain, dyspnea on exertion, irregular heartbeat, leg swelling, near-syncope, orthopnea, palpitations, paroxysmal nocturnal dyspnea and syncope.   Respiratory:  Negative for cough, shortness of breath, sleep disturbances due to breathing and sputum production.    Gastrointestinal:  Positive for diarrhea and nausea. Negative for change in bowel habit.   Genitourinary:  Negative for urgency.   Neurological:  Negative for dizziness and headaches.   Psychiatric/Behavioral:  Negative for altered mental status.             Objective:         BP (P) 129/69   Pulse (P) 73   Temp (P) 97.8 °F (36.6 °C) (Oral)   Resp (P) 13   Ht 152.4 cm (60\")   Wt 46.3 kg (102 lb)   LMP  (LMP Unknown)   SpO2 95%   BMI 19.92 kg/m²     Physical Exam:  General Appearance:    Alert, cooperative, in no acute distress                                Head: Atraumatic, normocephalic, PERRLA               Neck:   supple, trachea midline, no thyromegaly, no carotid bruit, no JVD   Lungs:     Clear to auscultation,respirations regular, even and               unlabored    Heart:    Regular rhythm and normal rate, normal S1 and S2   Abdomen:     Normal bowel sounds, no masses, no organomegaly, soft  nontender, nondistended, no guarding, no rebound  tenderness   Extremities:   Moves all extremities well, no edema, no cyanosis, no  redness   Pulses:   Pulses palpable and equal bilaterally   Skin:   No bleeding, bruising or rash   Neurologic:   Awake, alert, oriented x3       Scribed exam above.           ASCVD Risk Score::  The " "10-year ASCVD risk score (Kyle SHANKAR, et al., 2019) is: 9.8%    Values used to calculate the score:      Age: 64 years      Sex: Female      Is Non- : No      Diabetic: Yes      Tobacco smoker: No      Systolic Blood Pressure: 129 mmHg      Is BP treated: Yes      HDL Cholesterol: 51 mg/dL      Total Cholesterol: 171 mg/dL      Lab Review:     Results from last 7 days   Lab Units 01/07/25  0453 01/06/25  0553   SODIUM mmol/L 135* 136   POTASSIUM mmol/L 3.1* 3.2*   CHLORIDE mmol/L 108* 106   CO2 mmol/L 16.9* 16.6*   BUN mg/dL 50* 52*   CREATININE mg/dL 1.09* 1.79*   GLUCOSE mg/dL 97 116*   CALCIUM mg/dL 7.6* 7.7*   AST (SGOT) U/L 36* 36*   ALT (SGPT) U/L 17 18         Results from last 7 days   Lab Units 01/07/25  0453 01/06/25  0553   WBC 10*3/mm3 4.64 4.89   HEMOGLOBIN g/dL 10.7* 11.5*   HEMATOCRIT % 34.1 36.4   PLATELETS 10*3/mm3 36* 42*         Results from last 7 days   Lab Units 01/07/25  0453 01/06/25  0553   MAGNESIUM mg/dL 2.1 2.0           Invalid input(s): \"LDLCALC\"            Recent Radiology:  Imaging Results (Most Recent)       Procedure Component Value Units Date/Time    US Renal Bilateral [[id number]] Collected: 01/06/25 0932     Updated: 01/06/25 0935    Narrative:      US RENAL BILATERAL    Date of Exam: 1/6/2025 9:08 AM EST    Indication: JOSE, r/o obstruction.    Comparison: No comparisons available.    Technique: Grayscale and color Doppler ultrasound evaluation of the kidneys and urinary bladder was performed.      Findings:  Right kidney measures 9.8 cm length. Left kidney measures 9.7 cm length. Both kidneys are normal in echogenicity. No hydronephrosis. Grossly unremarkable incompletely distended urinary bladder      Impression:      Impression:  Normal ultrasound appearance of the kidneys with no evidence of obstructive uropathy        Electronically Signed: Roque Penn    1/6/2025 9:33 AM EST    Workstation ID: [id number]    CT Cervical Spine Without Contrast " [[id number]] Collected: 01/06/25 0158     Updated: 01/06/25 0206    Narrative:      CT CERVICAL SPINE WO CONTRAST    Date of Exam: 1/6/2025 1:09 AM EST    Indication: Neck pain after fall.    Comparison: 3/29/2023    Technique: Axial CT images were obtained of the cervical spine without contrast administration.  Sagittal and coronal reconstructions were performed.  Automated exposure control and iterative reconstruction methods were used.    Findings:  Patient is fused from C3-C7 with an anterior plate and screws from C3-C6. Cervical alignment is normal. There is multilevel facet arthropathy. There is disc space narrowing at C7-T1. There is scoliosis at the cervicothoracic junction. No acute cervical   spine fracture is identified. Paraspinal soft tissues are grossly normal.      Impression:      No acute cervical spine fracture. No significant interval change.      Electronically Signed: Brad Foster MD    1/6/2025 2:04 AM EST    Workstation ID: [id number]    CT Facial Bones Without Contrast [[id number]] Collected: 01/06/25 0154     Updated: 01/06/25 0200    Narrative:      CT FACIAL BONES WO CONTRAST    Date of Exam: 1/6/2025 1:09 AM EST    Indication: Fall with bruising.    Comparison: CT orbit 10/12/2018    Technique: Axial CT images were obtained from the inferior aspect of the mandible through the frontal sinuses without contrast administration.  Sagittal and coronal reconstructions were performed.  Automated exposure control and iterative reconstruction   methods were used.    Findings:  Temporomandibular joints demonstrate normal alignment. The mandible is intact. No acute facial bone fracture. No evidence of acute sinus disease. The globes and orbits appear grossly normal.      Impression:      Negative facial bone CT.        Electronically Signed: Brad Foster MD    1/6/2025 1:58 AM EST    Workstation ID: [id number]    CT Abdomen Pelvis Without Contrast [[id number]] Collected: 01/06/25 0144     Updated:  01/06/25 0158    Narrative:      CT ABDOMEN PELVIS WO CONTRAST    Date of Exam: 1/6/2025 1:09 AM EST    Indication: lower abdominal pain, diarrhea.    Comparison: CT abdomen pelvis August 5, 2024    Technique: Axial CT images were obtained of the abdomen and pelvis without the administration of contrast. Sagittal and coronal reconstructions were performed.  Automated exposure control and iterative reconstruction methods were used.    Findings:  Lung Bases:     There is mild right middle lobe atelectasis. There is bilateral basilar atelectasis. The heart is enlarged. There are postsurgical changes of the pulmonary artery. Heavy coronary artery calcifications are seen.    Liver:  The liver is of normal size. There are no focal liver lesions with noncontrast technique.    Biliary/Gallbladder:    Layering density in the gallbladder may be stones or sludge. The biliary tree is normal.    Spleen:  There is mild splenomegaly.    Pancreas:    Pancreas is normal. There is no evidence of pancreatic mass or peripancreatic fluid.    Kidneys:    There is a nonobstructing stone of the left kidney. There is no hydronephrosis of either kidney.    Adrenals:    Adrenal glands are unremarkable.    Retroperitoneal/Lymph Nodes/Vasculature:    No retroperitoneal adenopathy is identified.    Gastrointestinal/Mesentery:    The bowel loops are non-dilated without wall thickening or mass. The appendix appears within normal limits. No evidence of obstruction. No free air. No mesenteric fluid collections identified.    Bladder:    The bladder is normal.    Genital:     Unremarkable          Bony Structures:     There are old compression fractures of T12 and L1. A benign hemangioma of L4 is unchanged. There is no acute fracture.        Impression:      Impression:  1.No acute abdominal or pelvic abnormality.  2.Mild splenomegaly.  3.Nonobstructing left renal stone.  4.Layering density in the gallbladder may be stones or  sludge.                Electronically Signed: Rocky Heredia MD    1/6/2025 1:56 AM EST    Workstation ID: [id number]    CT Head Without Contrast [[id number]] Collected: 01/06/25 0148     Updated: 01/06/25 0156    Narrative:      CT HEAD WO CONTRAST    HISTORY:  Head injury from fall. Bruising.    TECHNIQUE:  Axial unenhanced head CT with coronal reformats. Radiation dose reduction techniques included automated exposure control or exposure modulation based on body size.    COMPARISON:  11/2/2024, 11/5/2023    FINDINGS:  Ventricular size and configuration are normal. No acute infarct or hemorrhage is identified. There are no masses. There is no skull fracture. Atherosclerotic calcifications are present in the vertebral arteries and carotid siphons. Mild chronic small   vessel ischemic changes are present in the white matter.      Impression:      Senescent changes without acute abnormality.          Electronically Signed: Brad Foster MD    1/6/2025 1:54 AM EST    Workstation ID: [id number]    XR Chest 1 View [[id number]] Collected: 01/06/25 0036     Updated: 01/06/25 0040    Narrative:      XR CHEST 1 VW    Date of Exam: 1/6/2025 12:20 AM EST    Indication: cough, soa    Comparison: 11/2/2024    Findings:  There is a small stent graft in the proximal descending aorta. The heart remains enlarged status post CABG and valve repair. Left-sided multipolar pacer is present. Patient is status post mastectomy with bilateral breast reconstructions. Patient is also   status post cervical spinal fusion. No acute infiltrates are identified. No pneumothorax.      Impression:      1.Cardiomegaly with postoperative changes of CABG and valve repair.  2.No acute infiltrates identified.        Electronically Signed: Brad Foster MD    1/6/2025 12:38 AM EST    Workstation ID: [id number]              ECHOCARDIOGRAM:    Results for orders placed during the hospital encounter of 03/07/23    Adult Transthoracic Echo Complete W/ Cont if  Necessary Per Protocol    Interpretation Summary    Left ventricular systolic function is normal. Left ventricular ejection fraction appears to be 56 - 60%.    Left ventricular diastolic function is consistent with (grade Ia w/high LAP) impaired relaxation.    Severely reduced right ventricular systolic function noted.    The right ventricular cavity is moderately dilated.    The right atrial cavity is moderate to severely  dilated.    There is moderate, bileaflet mitral valve thickening present.    Severe tricuspid valve regurgitation is present.    Estimated right ventricular systolic pressure from tricuspid regurgitation is mildly elevated (35-45 mmHg).    Mild pulmonary hypertension is present.    There is a trivial pericardial effusion. There is no evidence of cardiac tamponade.            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  Under therapy evaluation also pain management from oncology standpoint  She also history of COVID 2021 she was admitted recently in February 2023 with chest pain atypical nature thought to be secondary to metastatic disease  She has no history of obstructive CAD or coronary intervention     Plan:   Will arrange for GREGORY with Dr. Carrasquillo on Thursday afternoon for eval GBS and bacteremia in seetin go PPM/congenital ht ds  Other comorbidities assessed and managed today as well.   Npo after midnight on Thursday    Cont monitor renal function and replace  K    Stanley Quiroz, APRN  01/07/25  15:45 EST

## 2025-01-07 NOTE — PROGRESS NOTES
[facility] MEDICINE SERVICE  DAILY PROGRESS NOTE    NAME: Gladys Garrison  : 1960  MRN: [id number]      LOS: 1 day     PROVIDER OF SERVICE: Bautista Chapman MD    Chief Complaint: JOSE (acute kidney injury)    Subjective:     Interval History:  History taken from: patient    Patient still complaining of some nausea but attempting to eat some actual food today.  Denies any further diarrhea although still has abdominal cramps.    Review of Systems:   Review of Systems    Objective:     Vital Signs  Temp:  [96 °F (35.6 °C)-98 °F (36.7 °C)] (P) 97.8 °F (36.6 °C)  Heart Rate:  [60-73] (P) 73  Resp:  [10-20] (P) 13  BP: (102-131)/(54-74) (P) 129/69   Body mass index is 19.92 kg/m².    Physical Exam  General Appearance:  Alert, cooperative, no distress, appears stated age  Head:  Normocephalic, without obvious abnormality, atraumatic  Eyes:  PERRL, conjunctiva/corneas clear, EOM's intact, fundi benign, both eyes  Ears:  Normal TM's and external ear canals, both ears  Nose: Nares normal, septum midline, mucosa normal, no drainage or sinus tenderness  Throat: Lips, mucosa, and tongue normal; teeth and gums normal  Neck: Supple, symmetrical, trachea midline, no adenopathy, thyroid: not enlarged, symmetric, no tenderness/mass/nodules, no carotid bruit or JVD  Lungs:   Clear to auscultation bilaterally, respirations unlabored  Heart:  Regular rate and rhythm, S1, S2 normal, no murmur, rub or gallop  Abdomen:  Soft, mild diffuse TTP, bowel sounds active all four quadrants,  no masses, no organomegaly  Extremities: Extremities normal, atraumatic, no cyanosis or edema  Pulses: 2+ and symmetric  Skin: Skin color, texture, turgor normal, no rashes or lesions  Neurologic: Normal        Scheduled Meds   cefTRIAXone, 2,000 mg, Intravenous, Q24H  heparin (porcine), 5,000 Units, Subcutaneous, Q8H  insulin lispro, 2-7 Units, Subcutaneous, 4x Daily AC & at Bedtime  metroNIDAZOLE, 500 mg, Intravenous, Q8H  pantoprazole, 40 mg,  Oral, Q AM  potassium & sodium phosphates, 2 packet, Oral, Once  potassium & sodium phosphates, 2 packet, Oral, Once  potassium chloride ER, 40 mEq, Oral, Q4H  sodium chloride, 10 mL, Intravenous, Q12H       PRN Meds     Calcium Replacement - Follow Nurse / BPA Driven Protocol    dextrose    dextrose    glucagon (human recombinant)    HYDROcodone-acetaminophen    Magnesium Standard Dose Replacement - Follow Nurse / BPA Driven Protocol    nitroglycerin    ondansetron    Phosphorus Replacement - Follow Nurse / BPA Driven Protocol    Potassium Replacement - Follow Nurse / BPA Driven Protocol    sodium chloride    sodium chloride    sodium chloride   Infusions           Diagnostic Data    Results from last 7 days   Lab Units 01/07/25  0453   WBC 10*3/mm3 4.64   HEMOGLOBIN g/dL 10.7*   HEMATOCRIT % 34.1   PLATELETS 10*3/mm3 36*   GLUCOSE mg/dL 97   CREATININE mg/dL 1.09*   BUN mg/dL 50*   SODIUM mmol/L 135*   POTASSIUM mmol/L 3.1*   AST (SGOT) U/L 36*   ALT (SGPT) U/L 17   ALK PHOS U/L 39   BILIRUBIN mg/dL 0.7   ANION GAP mmol/L 10.1       US Renal Bilateral    Result Date: 1/6/2025  Impression: Normal ultrasound appearance of the kidneys with no evidence of obstructive uropathy Electronically Signed: Roque Penn  1/6/2025 9:33 AM EST  Workstation ID: [id number]    CT Cervical Spine Without Contrast    Result Date: 1/6/2025  No acute cervical spine fracture. No significant interval change. Electronically Signed: Brad Foster MD  1/6/2025 2:04 AM EST  Workstation ID: [id number]    CT Facial Bones Without Contrast    Result Date: 1/6/2025  Negative facial bone CT. Electronically Signed: Brad Foster MD  1/6/2025 1:58 AM EST  Workstation ID: [id number]    CT Abdomen Pelvis Without Contrast    Result Date: 1/6/2025  Impression: 1.No acute abdominal or pelvic abnormality. 2.Mild splenomegaly. 3.Nonobstructing left renal stone. 4.Layering density in the gallbladder may be stones or sludge. Electronically Signed: Rocky Heredai  MD  1/6/2025 1:56 AM EST  Workstation ID: [id number]    CT Head Without Contrast    Result Date: 1/6/2025  Senescent changes without acute abnormality. Electronically Signed: Brad Foster MD  1/6/2025 1:54 AM EST  Workstation ID: [id number]    XR Chest 1 View    Result Date: 1/6/2025  1.Cardiomegaly with postoperative changes of CABG and valve repair. 2.No acute infiltrates identified. Electronically Signed: Brad Foster MD  1/6/2025 12:38 AM EST  Workstation ID: [id number]       I reviewed the patient's new clinical results.    Assessment/Plan:   Gladys Garrison is a 64 y.o. female with a PMH of breast cancer with metastasis to bone, complex cardiac medical history including tetralogy of Fallot, coarctation of aorta, VSD status post repair, coarctation of aorta S/P stent who presented to [facility] on 1/5/2025 with diarrhea since around last Tuesday it is dark color, associated with nausea and multiple episodes of vomiting. States she has not been able to keep anything down, having generalized abdominal cramps, subjective fever and chills, generalized weakness. States she was treated for a UTI last month. States she has not urinated at all in past 3-4 days. Denies hematuria or dysuria, no flank pain. She sustained a fall 2 days ago and hit her head and face, denies LOC. Workup in ER chemistry labs show creat 2.05, K 3.4, Na 135, albumin 3.2, T bili 1.4. CBC labs Hb 12.6, wbc 11.4, platelets 52. ED course notable for hypotension that slightly improved with IVF, temp 100.1. Saturating well on room air. The decision to admit was made.     Hypotension due to volume depletion  Diarrheal illness  Oliguric JOSE due to hypovolemia on the basis of diarrhea and vomiting, NSAIDS use  Melena by history  -Patient was initiated on IV fluids with improvement in renal function and near baseline  -Awaiting C. difficile testing and enteric panel testing although patient has not had any further diarrhea  -Continue IV  antibiotics with ceftriaxone, Flagyl for now  -Holding all nephrotoxic medications including losartan given JOSE  -Advanced to GI soft diet    Group B strep bacteremia  -Unclear source although this could be related to UTI  -Urine culture pending  Repeat blood cultures pending  -ID consult  -Continue ceftriaxone  -Check echocardiogram given patient's history of PE and concern for bacterial vegetation    Possible UTI  -UA on admission suggestive of possible infection  -Continue ceftriaxone and follow-up final cultures        History breast cancer with bone metastasis  Thrombocytopenia  -Was on Arimidex in the past was discontinued due to osteoporosis  -Had intolerance to tamoxifen  -She is currently on Abemeciclib outpatient, this has been considered to have alternate therapies given frequent UTIs as per pharmacy note on 12/10  -Will consult oncology given her thrombocytopenia which is likely related to her chemotherapy  -Monitor daily CBCs, monitor platelets  -Continue Norco  mg q 4 PRN for moderate pain  -Continue with intrathecal pump          VTE Prophylaxis:  Pharmacologic VTE prophylaxis orders are present.         Code status is   There are no questions and answers to display.       Plan for disposition: Hopefully DC home by 1/10/2025    Time: 30 minutes    Part of this note may be an electronic transcription/translation of spoken language to printed text using the Dragon Dictation System.    Signature: Electronically signed by Bautista Chapman MD, 01/07/25, 13:03 EST.  [facility]

## 2025-01-07 NOTE — PLAN OF CARE
Problem: Comorbidity Management  Goal: Blood Glucose Level Within Target Range  Intervention: Monitor and Manage Glycemia  Recent Flowsheet Documentation  Taken 1/7/2025 0828 by Morris Briseno RN  Medication Review/Management: medications reviewed     Problem: Fall Injury Risk  Goal: Absence of Fall and Fall-Related Injury  Outcome: Progressing  Intervention: Identify and Manage Contributors  Recent Flowsheet Documentation  Taken 1/7/2025 0828 by Morris Birseno RN  Medication Review/Management: medications reviewed  Self-Care Promotion: independence encouraged  Intervention: Promote Injury-Free Environment  Recent Flowsheet Documentation  Taken 1/7/2025 1618 by Morris Briseno RN  Safety Promotion/Fall Prevention:   safety round/check completed   room organization consistent   nonskid shoes/slippers when out of bed   fall prevention program maintained   clutter free environment maintained   assistive device/personal items within reach  Taken 1/7/2025 1400 by Morris Briseno RN  Safety Promotion/Fall Prevention:   safety round/check completed   room organization consistent   nonskid shoes/slippers when out of bed   fall prevention program maintained   clutter free environment maintained   assistive device/personal items within reach  Taken 1/7/2025 1206 by Morris Briseno RN  Safety Promotion/Fall Prevention:   safety round/check completed   room organization consistent   nonskid shoes/slippers when out of bed   fall prevention program maintained   assistive device/personal items within reach   clutter free environment maintained  Taken 1/7/2025 1025 by Morris Briseno, RN  Safety Promotion/Fall Prevention:   safety round/check completed   room organization consistent   nonskid shoes/slippers when out of bed   fall prevention program maintained   clutter free environment maintained   assistive device/personal items within reach  Taken 1/7/2025 0828 by Morris Briseno, RN  Safety Promotion/Fall Prevention:   room organization  consistent   safety round/check completed   nonskid shoes/slippers when out of bed   fall prevention program maintained   clutter free environment maintained   assistive device/personal items within reach     Goal Outcome Evaluation:  Plan of Care Reviewed With: patient        Progress: declining

## 2025-01-07 NOTE — CONSULTS
[facility]/ [facility]                                                        Nephrology Initial Consult Note    Patient Identification:  Name: Gladys Garrison MRN: [id number]  Age: 64 y.o. : 1960  Sex: female  Date:2025    Requesting Physician: As per consult order.  Reason for Consultation: Acute kidney injury  Information from:patient/ family/ chart      History of Present Illness: This is a 64 y.o. year old female  with medical history of metastatic ca breast currently on Abemeciclib outpatient, admitted after she developed symptoms of nausea / vomiting and diarrhea. Nephrology service consulted after Patient recognized to have acute kidney injury, now improving with hydration.  Pt noted on NSAIDs at home. Pt not able to provide meaningful history, appear little tire and weak.     The following medical history and medications personally reviewed by me:    Problem List:     JOSE (acute kidney injury)      Past Medical History:  Past Medical History:   Diagnosis Date    Allergic     Bone cancer     METASTATIC BONE; stage 4    Bradycardia     SECONDARY TO ABLATION    Breast cancer 2017    mets to lymph nodes; did not do radiation    Cancer of unknown origin     Compression fx, thoracic spine, open, initial encounter     Coronary artery disease     COVID-19 2021    Diabetes mellitus     Heart disease, unspecified     Hepatitis C     RESOLVED WITH MEDICATION    Hyperlipidemia     Hypertension     Obesity (BMI 30-39.9) 2021    Rib fracture     Per patient    Sleep apnea     no machine    Tachycardia     ATRIAL    Type 2 diabetes mellitus 2017       Past Surgical History:  Past Surgical History:   Procedure Laterality Date    BACK SURGERY      neck X 2    BREAST RECONSTRUCTION Bilateral     CARDIAC ABLATION       atrial tachycardia x 5 ablations     CARDIAC CATHETERIZATION      CARDIAC  ELECTROPHYSIOLOGY PROCEDURE N/A 2023    Procedure: Pacemaker RV lead insertion with generator change out. [employer];  Surgeon: Steven Hammond MD;  Location: [facility] CATH INVASIVE LOCATION;  Service: Cardiovascular;  Laterality: N/A;    CARDIAC SURGERY      stent placed in aorta    CARDIAC SURGERY      6 surgeries as baby     CERVICAL FUSION ANTERIOR WITH ARTIFICIAL DISCECTOMY IMPLANTATION N/A 2020    Procedure: C4 VERTEBRECTOMY AND ANTERIOR CERIVCAL DISCECTOMY WITH FUSION OF CERVICAL THREE THROUGH FIVE WITH REMOVAL OF HARDWARE C5-C6;  Surgeon: Mark Kaba MD;  Location: [facility] MAIN OR;  Service: Neurosurgery;  Laterality: N/A;     SECTION      x2    COLONOSCOPY N/A 10/23/2020    Procedure: COLONOSCOPY WITH POLYPECTOMY X6;  Surgeon: Stanley Bourne MD;  Location: [facility] ENDOSCOPY;  Service: Gastroenterology;  Laterality: N/A;  POLYPS, INTERNAL HEMORRHOIDS    ENDOMETRIAL ABLATION      REMOVAL SCAR TISSUE UTERINE    ENDOSCOPY N/A 10/23/2020    Procedure: ESOPHAGOGASTRODUODENOSCOPY WITH BIOPSY X 1 AREA;  Surgeon: Stanley Bourne MD;  Location: [facility] ENDOSCOPY;  Service: Gastroenterology;  Laterality: N/A;  GASTRITIS, ESOPHAGITIS, HIATAL HERNIA    INSERT / REPLACE / REMOVE PACEMAKER      KNEE SURGERY      MASTECTOMY Bilateral     NECK SURGERY      PACEMAKER IMPLANTATION      PAIN PUMP INSERTION/REVISION N/A 2022    Procedure: PAIN PUMP INSERTION AND INTRATHECAL CATHETER PLACEMENT;  Surgeon: Chuck Hunter MD;  Location: [facility] MAIN OR;  Service: Pain Management;  Laterality: N/A;        Home Meds:   Medications Prior to Admission   Medication Sig Dispense Refill Last Dose/Taking    exemestane (AROMASIN) 25 MG tablet Take 1 tablet by mouth Daily.   Past Week    Morphine Sulfate, PF, 8 MG/ML solution 8.5 mg by Intrathecal Infusion route Daily. Receives 8 mg through pain pump q 24 hrs   2025    Abemaciclib (VERZENIO) 100 mg tablet chemo tablet Take 1  "tablet by mouth 2 (Two) Times a Day. Take with or without food; Swallow whole, do not crush, chew or split tablets. 60 tablet 5 Unknown    Blood Glucose Monitoring Suppl (Accu-Chek Araceli) device Use as instructed   To test blood sugar bid 1 each 0 Unknown    Calcium Carbonate-Vit D-Min (Calcium 600+D Plus Minerals) 600-400 MG-UNIT chewable tablet Chew 600 mg 2 (Two) Times a Day. 60 each 6 Unknown    Continuous Blood Gluc  (FreeStyle Rajeev 14 Day Lamar) device 1 each Daily. 1 each 0 Unknown    Continuous Blood Gluc Sensor (FreeStyle Rajeev 14 Day Sensor) misc 1 each Daily. 6 each 3 Unknown    cyclobenzaprine (FLEXERIL) 10 MG tablet Take 1 tablet by mouth 2 (Two) Times a Day As Needed for Muscle Spasms. 30 tablet 1 Unknown    diphenoxylate-atropine (LOMOTIL) 2.5-0.025 MG per tablet Take 1 tablet by mouth 3 (Three) Times a Day. 30 tablet 1 Unknown    famotidine (PEPCID) 20 MG tablet Take 1 tablet by mouth 2 (Two) Times a Day As Needed for Heartburn.       fluticasone (FLONASE) 50 MCG/ACT nasal spray Administer 2 sprays into the nostril(s) as directed by provider.       HYDROcodone-acetaminophen (NORCO)  MG per tablet Take 1 tablet by mouth Every 4 (Four) Hours As Needed for Moderate Pain. 180 tablet 0 Unknown    ibuprofen (ADVIL,MOTRIN) 800 MG tablet Take 1 tablet by mouth Every 6 (Six) Hours As Needed for Mild Pain. 90 tablet 2 Unknown    insulin NPH-insulin regular (humuLIN 70/30,novoLIN 70/30) (70-30) 100 UNIT/ML injection Inject 5 Units under the skin into the appropriate area as directed Every Evening. If BS over 180 15 mL 3 Unknown    Insulin Pen Needle (B-D UF III MINI PEN NEEDLES) 31G X 5 MM misc Use to inject insulin twice daily   DX: E11.9 100 each 0 Unknown    Insulin Syringe-Needle U-100 28G X 1/2\" 1 ML misc 1 each 2 (Two) Times a Day. 100 each 6 Unknown    losartan (Cozaar) 50 MG tablet Take 1 tablet by mouth Daily. Discontinue the lisinopril due to interaction with new chemotherapy 90 tablet " 1     Naloxegol Oxalate (Movantik) 12.5 MG tablet Take 1 tablet by mouth Every Morning. 30 tablet 1     ondansetron ODT (ZOFRAN-ODT) 4 MG disintegrating tablet Place 2 tablets on the tongue Every 8 (Eight) Hours As Needed for Nausea or Vomiting. 30 tablet 3 Unknown    rosuvastatin (Crestor) 20 MG tablet Take 1 tablet by mouth Every Night. 90 tablet 0 Unknown       Current Meds:   Current Facility-Administered Medications   Medication Dose Route Frequency Provider Last Rate Last Admin    Calcium Replacement - Follow Nurse / BPA Driven Protocol   Not Applicable PRN Llanes Alvarez, Carlos, MD        cefTRIAXone (ROCEPHIN) 2,000 mg in sodium chloride 0.9 % 100 mL MBP  2,000 mg Intravenous Q24H Khari Casillas MD        dextrose (D50W) (25 g/50 mL) IV injection 25 g  25 g Intravenous Q15 Min PRN Llanes Alvarez, Carlos, MD        dextrose (GLUTOSE) oral gel 15 g  15 g Oral Q15 Min PRN Llanes Alvarez, Carlos, MD        glucagon (GLUCAGEN) injection 1 mg  1 mg Intramuscular Q15 Min PRN Llanes Alvarez, Carlos, MD        heparin (porcine) 5000 UNIT/ML injection 5,000 Units  5,000 Units Subcutaneous Q8H Llanes Alvarez, Carlos, MD   5,000 Units at 01/06/25 2105    HYDROcodone-acetaminophen (NORCO)  MG per tablet 1 tablet  1 tablet Oral Q4H PRN Llanes Alvarez, Carlos, MD        insulin lispro (HUMALOG/ADMELOG) injection 2-7 Units  2-7 Units Subcutaneous 4x Daily AC & at Bedtime Llanes Alvarez, Carlos, MD        Magnesium Standard Dose Replacement - Follow Nurse / BPA Driven Protocol   Not Applicable PRN Llanes Alvarez, Carlos, MD        metroNIDAZOLE (FLAGYL) IVPB 500 mg  500 mg Intravenous Q8H Llanes Alvarez, Carlos,  mL/hr at 01/06/25 2053 500 mg at 01/06/25 2053    nitroglycerin (NITROSTAT) SL tablet 0.4 mg  0.4 mg Sublingual Q5 Min PRN Llanes Alvarez, Carlos, MD        ondansetron (ZOFRAN) injection 4 mg  4 mg Intravenous Q6H PRN Llanes Alvarez, Carlos, MD        pantoprazole (PROTONIX) EC tablet 40 mg  40  mg Oral Q AM Khari Casillas MD        Phosphorus Replacement - Follow Nurse / BPA Driven Protocol   Not Applicable PRN Llanes Alvarez, Carlos, MD        Potassium Replacement - Follow Nurse / BPA Driven Protocol   Not Applicable PRN Llanes Alvarez, Carlos, MD        sodium chloride 0.9 % flush 10 mL  10 mL Intravenous PRN Christ Young MD        sodium chloride 0.9 % flush 10 mL  10 mL Intravenous Q12H Llanes Alvarez, Carlos, MD   10 mL at 01/06/25 2110    sodium chloride 0.9 % flush 10 mL  10 mL Intravenous PRN Llanes Alvarez, Carlos, MD        sodium chloride 0.9 % infusion 40 mL  40 mL Intravenous PRN Llanes Alvarez, Carlos, MD        sodium chloride 0.9 % infusion  100 mL/hr Intravenous Continuous Llanes Alvarez, Carlos,  mL/hr at 01/06/25 1942 100 mL/hr at 01/06/25 1942       Allergies:  Allergies   Allergen Reactions    Oxycodone Nausea And Vomiting    Promethazine Other (See Comments)     Hyperactive mean    Tape Other (See Comments)     .blisters      Adhesive Tape Rash    Flexeril [Cyclobenzaprine] Rash       Social History:   Social History     Socioeconomic History    Marital status: Legally     Number of children: 2   Tobacco Use    Smoking status: Never     Passive exposure: Never    Smokeless tobacco: Never   Vaping Use    Vaping status: Never Used   Substance and Sexual Activity    Alcohol use: Yes     Comment: drinks rarely    Drug use: Never    Sexual activity: Defer        Family History:  Family History   Problem Relation Age of Onset    Heart disease Mother     Stroke Mother     Lung cancer Mother     Aneurysm Father     Diabetes Sister     No Known Problems Brother     No Known Problems Brother     Diabetes type I Half-Sister     Thyroid cancer Half-Sister     Cancer Maternal Aunt     Heart attack Sister     Thyroid disease Sister     No Known Problems Sister         Review of Systems: as per HPI, in addition:    General:       no weakness / fatigue,                       " No fevers / chills                       no weight loss  HEENT;       no dysphagia or visual changes  Neck:           normal range of motion, no swelling  Respiratory: no cough / congestion                      No shortness of air                       No wheezing  CV:              No chest pain                       No palpitations  Abdomen/GI: no nausea / vomiting                      No diarrhea / constipation                      No abdominal pain  :             no dysuria / urinary frequency                       No urgency, normal output  Endocrine:   no polyuria / polydipsia,                      No heat or cold intolerance  Skin:           no rashes or skin lesions   Vascular:   No edema  Musculoskeletal: no joint pain or deformities      Physical Exam:  Vitals:   Temp (24hrs), Av.6 °F (35.9 °C), Min:96 °F (35.6 °C), Max:97.4 °F (36.3 °C)    /54 (BP Location: Right arm, Patient Position: Lying)   Pulse 60   Temp 97.4 °F (36.3 °C) (Oral)   Resp 10   Ht 152.4 cm (60\")   Wt 46.3 kg (102 lb)   LMP  (LMP Unknown)   SpO2 99%   BMI 19.92 kg/m²   Intake/Output:     Intake/Output Summary (Last 24 hours) at 2025 0359  Last data filed at 2025 2100  Gross per 24 hour   Intake 2040 ml   Output 300 ml   Net 1740 ml        Wt Readings from Last 1 Encounters:   25 2351 46.3 kg (102 lb)       Exam:    General Appearance:  Awake, alert, no acute distress  Good -appearing   Head and Face:  Normocephalic, atraumatic, mucous membranes moist, oropharynx clear   Eyes:  No icterus, pupils equal round and reactive to light, extraocular movements intact    ENMT: Moist mucosa, tongue symmetric    Neck: Supple  no jugular venous distention  no thyromegaly   Pulmonary:  Respiratory effort: Normal  Auscultation of lungs: Clear bilaterally  No wheezes  No rhonchi  Good air movement, good expansion   Chest wall:  No tenderness or deformity   Cardiovascular:  Auscultation of the heart: Normal rhythm, no " murmurs  no edema of bilateral lower extremities   Abdomen:  Abdomen: soft, nontender, normal bowel sounds all four quadrants, no masses   Liver and spleen: no hepatosplenomegaly   Musculoskeletal: Digits and nails: normal  Normal range of motion  No joint swelling or gross deformities    Skin: Skin inspection: color normal, no visible rashes or lesions  Skin palpation: texture, turgor normal, no palpable lesions   Lymphatic:  no cervical lymphadenopathy    Psychiatric: Judgement and insight: normal  Orientation to person place and time: normal  Mood and affect: normal       DATA:  Radiology and Labs:  The following labs independently reviewed by me, additional AM labs ordered  Old records independently reviewed showing   The following radiologic studies independently viewed by me, findings   Interval notes, chart personally reviewed by me.  I have reviewed and summarized old records as detailed above  Plan of care discussed with   New problems include   Dialysis patient access:     Risk/ complexity of medical care/ medical decision making:   Chronic illness with severe exacerbation or progression      Labs:   Recent Results (from the past 24 hours)   CBC Auto Differential    Collection Time: 01/06/25  5:53 AM    Specimen: Blood   Result Value Ref Range    WBC 4.89 3.40 - 10.80 10*3/mm3    RBC 3.82 3.77 - 5.28 10*6/mm3    Hemoglobin 11.5 (L) 12.0 - 15.9 g/dL    Hematocrit 36.4 34.0 - 46.6 %    MCV 95.3 79.0 - 97.0 fL    MCH 30.1 26.6 - 33.0 pg    MCHC 31.6 31.5 - 35.7 g/dL    RDW 14.6 12.3 - 15.4 %    RDW-SD 51.6 37.0 - 54.0 fl    MPV 12.6 (H) 6.0 - 12.0 fL    Platelets 42 (C) 140 - 450 10*3/mm3   Comprehensive Metabolic Panel    Collection Time: 01/06/25  5:53 AM    Specimen: Blood   Result Value Ref Range    Glucose 116 (H) 65 - 99 mg/dL    BUN 52 (H) 8 - 23 mg/dL    Creatinine 1.79 (H) 0.57 - 1.00 mg/dL    Sodium 136 136 - 145 mmol/L    Potassium 3.2 (L) 3.5 - 5.2 mmol/L    Chloride 106 98 - 107 mmol/L    CO2  16.6 (L) 22.0 - 29.0 mmol/L    Calcium 7.7 (L) 8.6 - 10.5 mg/dL    Total Protein 6.2 6.0 - 8.5 g/dL    Albumin 3.3 (L) 3.5 - 5.2 g/dL    ALT (SGPT) 18 1 - 33 U/L    AST (SGOT) 36 (H) 1 - 32 U/L    Alkaline Phosphatase 43 39 - 117 U/L    Total Bilirubin 1.4 (H) 0.0 - 1.2 mg/dL    Globulin 2.9 gm/dL    A/G Ratio 1.1 g/dL    BUN/Creatinine Ratio 29.1 (H) 7.0 - 25.0    Anion Gap 13.4 5.0 - 15.0 mmol/L    eGFR 31.7 (L) >60.0 mL/min/1.73   Magnesium    Collection Time: 01/06/25  5:53 AM    Specimen: Blood   Result Value Ref Range    Magnesium 2.0 1.6 - 2.4 mg/dL   Phosphorus    Collection Time: 01/06/25  5:53 AM    Specimen: Blood   Result Value Ref Range    Phosphorus 2.4 (L) 2.5 - 4.5 mg/dL   Scan Slide    Collection Time: 01/06/25  5:53 AM    Specimen: Blood   Result Value Ref Range    Scan Slide     Manual Differential    Collection Time: 01/06/25  5:53 AM    Specimen: Blood   Result Value Ref Range    Neutrophil % 64.0 42.7 - 76.0 %    Lymphocyte % 0.0 (L) 19.6 - 45.3 %    Monocyte % 4.0 (L) 5.0 - 12.0 %    Bands %  18.0 (H) 0.0 - 5.0 %    Metamyelocyte % 12.0 (H) 0.0 - 0.0 %    Atypical Lymphocyte % 2.0 0.0 - 5.0 %    Neutrophils Absolute 4.01 1.70 - 7.00 10*3/mm3    Lymphocytes Absolute 0.10 (L) 0.70 - 3.10 10*3/mm3    Monocytes Absolute 0.20 0.10 - 0.90 10*3/mm3    Crenated RBC's Slight/1+ None Seen    Dacrocytes Slight/1+ None Seen    Poikilocytes Slight/1+ None Seen    Toxic Granulation Mod/2+ None Seen    Vacuolated Neutrophils Mod/2+ None Seen    Platelet Estimate Decreased Normal    Giant Platelets Slight/1+ None Seen   Path Consult Reflex    Collection Time: 01/06/25  5:53 AM    Specimen: Blood   Result Value Ref Range    Pathology Review Yes    POC Glucose 4x Daily Before Meals & at Bedtime    Collection Time: 01/06/25 11:27 AM    Specimen: Blood   Result Value Ref Range    Glucose 91 70 - 105 mg/dL   POC Glucose Once    Collection Time: 01/06/25  4:08 PM    Specimen: Blood   Result Value Ref Range     Glucose 101 70 - 105 mg/dL   POC Glucose Once    Collection Time: 01/06/25  9:04 PM    Specimen: Blood   Result Value Ref Range    Glucose 79 70 - 105 mg/dL       Radiology:  Imaging Results (Last 24 Hours)       Procedure Component Value Units Date/Time    US Renal Bilateral [[id number]] Collected: 01/06/25 0932     Updated: 01/06/25 0935    Narrative:      US RENAL BILATERAL    Date of Exam: 1/6/2025 9:08 AM EST    Indication: JOSE, r/o obstruction.    Comparison: No comparisons available.    Technique: Grayscale and color Doppler ultrasound evaluation of the kidneys and urinary bladder was performed.      Findings:  Right kidney measures 9.8 cm length. Left kidney measures 9.7 cm length. Both kidneys are normal in echogenicity. No hydronephrosis. Grossly unremarkable incompletely distended urinary bladder      Impression:      Impression:  Normal ultrasound appearance of the kidneys with no evidence of obstructive uropathy        Electronically Signed: Roque Penn    1/6/2025 9:33 AM EST    Workstation ID: [id number]                 ASSESSMENT / PLAN    Acute kidney injury improving already secondary to intravascular volume depletion. UA revealed protein, ketones, wbc and very few RBCs,     Plan  Repeat UA in next few days  Check protein creatine ratio  Follow urine culture ... Revealing growth.  US renal no hydro.  Avoid NSAIDs, Avoid Nephrotoxic medication.  Continue  ml per hour for now.       Hypokalemia replace as needed.    Nausea / vomiting / diarrhea     Bacteremia as per ID/ primary service.    Ca breast  on Abemeciclib as per oncology service.  Thrombocytopenia     Thanks for consult.    Will follow the patient with you.          Continue to monitor electrolytes and volume closely, avoid IV contrast and nephrotoxic medications           Angelica Rodriguez MD  [facility]  Office phone number: [phone]  Answering service phone number: [phone]      1/7/2025        Dictation via  [employer] dictation software

## 2025-01-07 NOTE — CONSULTS
Infectious Diseases Consult Note    Referring Provider: Bautista Chapman MD    Reason for Consultation: Bacteremia    Patient Care Team:  Chirag Robles DO as PCP - General (Family Medicine)  Маряи Nunez MD as Consulting Physician (Hematology and Oncology)  Steven Hammond MD as Consulting Physician (Cardiology)    Chief complaint nausea vomiting and decreased urine output    Subjective     History of present illness:      This is a 64-year-old female who was hospitalized [facility] on January 5, 2025 complains of nausea vomiting and fever.  Patient was found to be bacteremic and blood cultures x 2 sets grew group B streptococcus.  Patient has no open wounds or signs of cellulitis.  Urine culture grew group B streptococcus but is only 50,000 colonies.  CT scan of abdomen pelvis and renal ultrasound showed no obstructive uropathy.    Review of Systems   Review of Systems   Constitutional:  Positive for fever.   HENT: Negative.     Eyes: Negative.    Respiratory: Negative.     Cardiovascular: Negative.    Gastrointestinal: Negative.    Genitourinary: Negative.    Musculoskeletal: Negative.    Skin: Negative.    Neurological: Negative.    Hematological: Negative.    Psychiatric/Behavioral: Negative.         Medications  Medications Prior to Admission   Medication Sig Dispense Refill Last Dose/Taking    exemestane (AROMASIN) 25 MG tablet Take 1 tablet by mouth Daily.   Past Week    Morphine Sulfate, PF, 8 MG/ML solution 8.5 mg by Intrathecal Infusion route Daily. Receives 8 mg through pain pump q 24 hrs   1/5/2025    Abemaciclib (VERZENIO) 100 mg tablet chemo tablet Take 1 tablet by mouth 2 (Two) Times a Day. Take with or without food; Swallow whole, do not crush, chew or split tablets. 60 tablet 5 Unknown    Blood Glucose Monitoring Suppl (Accu-Chek Araceli) device Use as instructed   To test blood sugar bid 1 each 0 Unknown    Calcium Carbonate-Vit D-Min (Calcium 600+D Plus Minerals)  "600-400 MG-UNIT chewable tablet Chew 600 mg 2 (Two) Times a Day. 60 each 6 Unknown    Continuous Blood Gluc  (FreeStyle Rajeev 14 Day Conetoe) device 1 each Daily. 1 each 0 Unknown    Continuous Blood Gluc Sensor (FreeStyle Rajeev 14 Day Sensor) misc 1 each Daily. 6 each 3 Unknown    cyclobenzaprine (FLEXERIL) 10 MG tablet Take 1 tablet by mouth 2 (Two) Times a Day As Needed for Muscle Spasms. 30 tablet 1 Unknown    diphenoxylate-atropine (LOMOTIL) 2.5-0.025 MG per tablet Take 1 tablet by mouth 3 (Three) Times a Day. 30 tablet 1 Unknown    famotidine (PEPCID) 20 MG tablet Take 1 tablet by mouth 2 (Two) Times a Day As Needed for Heartburn.       fluticasone (FLONASE) 50 MCG/ACT nasal spray Administer 2 sprays into the nostril(s) as directed by provider.       HYDROcodone-acetaminophen (NORCO)  MG per tablet Take 1 tablet by mouth Every 4 (Four) Hours As Needed for Moderate Pain. 180 tablet 0 Unknown    ibuprofen (ADVIL,MOTRIN) 800 MG tablet Take 1 tablet by mouth Every 6 (Six) Hours As Needed for Mild Pain. 90 tablet 2 Unknown    insulin NPH-insulin regular (humuLIN 70/30,novoLIN 70/30) (70-30) 100 UNIT/ML injection Inject 5 Units under the skin into the appropriate area as directed Every Evening. If BS over 180 15 mL 3 Unknown    Insulin Pen Needle (B-D UF III MINI PEN NEEDLES) 31G X 5 MM misc Use to inject insulin twice daily   DX: E11.9 100 each 0 Unknown    Insulin Syringe-Needle U-100 28G X 1/2\" 1 ML misc 1 each 2 (Two) Times a Day. 100 each 6 Unknown    losartan (Cozaar) 50 MG tablet Take 1 tablet by mouth Daily. Discontinue the lisinopril due to interaction with new chemotherapy 90 tablet 1     Naloxegol Oxalate (Movantik) 12.5 MG tablet Take 1 tablet by mouth Every Morning. 30 tablet 1     ondansetron ODT (ZOFRAN-ODT) 4 MG disintegrating tablet Place 2 tablets on the tongue Every 8 (Eight) Hours As Needed for Nausea or Vomiting. 30 tablet 3 Unknown    rosuvastatin (Crestor) 20 MG tablet Take 1 " tablet by mouth Every Night. 90 tablet 0 Unknown       History  Past Medical History:   Diagnosis Date    Allergic     Bone cancer     METASTATIC BONE; stage 4    Bradycardia     SECONDARY TO ABLATION    Breast cancer     mets to lymph nodes; did not do radiation    Cancer of unknown origin     Compression fx, thoracic spine, open, initial encounter     Coronary artery disease     COVID-19 2021    Diabetes mellitus     Heart disease, unspecified     Hepatitis C     RESOLVED WITH MEDICATION    Hyperlipidemia     Hypertension     Obesity (BMI 30-39.9) 2021    Rib fracture     Per patient    Sleep apnea     no machine    Tachycardia     ATRIAL    Type 2 diabetes mellitus 2017     Past Surgical History:   Procedure Laterality Date    BACK SURGERY      neck X 2    BREAST RECONSTRUCTION Bilateral     CARDIAC ABLATION       atrial tachycardia x 5 ablations     CARDIAC CATHETERIZATION      CARDIAC ELECTROPHYSIOLOGY PROCEDURE N/A 2023    Procedure: Pacemaker RV lead insertion with generator change out. [employer];  Surgeon: Steven Hammond MD;  Location: [facility] CATH INVASIVE LOCATION;  Service: Cardiovascular;  Laterality: N/A;    CARDIAC SURGERY      stent placed in aorta    CARDIAC SURGERY      6 surgeries as baby     CERVICAL FUSION ANTERIOR WITH ARTIFICIAL DISCECTOMY IMPLANTATION N/A 2020    Procedure: C4 VERTEBRECTOMY AND ANTERIOR CERIVCAL DISCECTOMY WITH FUSION OF CERVICAL THREE THROUGH FIVE WITH REMOVAL OF HARDWARE C5-C6;  Surgeon: Mark Kaba MD;  Location: [facility] MAIN OR;  Service: Neurosurgery;  Laterality: N/A;     SECTION      x2    COLONOSCOPY N/A 10/23/2020    Procedure: COLONOSCOPY WITH POLYPECTOMY X6;  Surgeon: Stanley Bourne MD;  Location: [facility] ENDOSCOPY;  Service: Gastroenterology;  Laterality: N/A;  POLYPS, INTERNAL HEMORRHOIDS    ENDOMETRIAL ABLATION      REMOVAL SCAR TISSUE UTERINE    ENDOSCOPY N/A 10/23/2020    Procedure:  ESOPHAGOGASTRODUODENOSCOPY WITH BIOPSY X 1 AREA;  Surgeon: Stanley Bourne MD;  Location: [facility] ENDOSCOPY;  Service: Gastroenterology;  Laterality: N/A;  GASTRITIS, ESOPHAGITIS, HIATAL HERNIA    INSERT / REPLACE / REMOVE PACEMAKER      KNEE SURGERY  2000    MASTECTOMY Bilateral     NECK SURGERY      PACEMAKER IMPLANTATION      PAIN PUMP INSERTION/REVISION N/A 04/05/2022    Procedure: PAIN PUMP INSERTION AND INTRATHECAL CATHETER PLACEMENT;  Surgeon: Chuck Hunter MD;  Location: [facility] MAIN OR;  Service: Pain Management;  Laterality: N/A;       Family History  Family History   Problem Relation Age of Onset    Heart disease Mother     Stroke Mother     Lung cancer Mother     Aneurysm Father     Diabetes Sister     No Known Problems Brother     No Known Problems Brother     Diabetes type I Half-Sister     Thyroid cancer Half-Sister     Cancer Maternal Aunt     Heart attack Sister     Thyroid disease Sister     No Known Problems Sister        Social History   reports that she has never smoked. She has never been exposed to tobacco smoke. She has never used smokeless tobacco. She reports current alcohol use. She reports that she does not use drugs.    Allergies  Oxycodone, Promethazine, Tape, Adhesive tape, and Flexeril [cyclobenzaprine]    Objective     Vital Signs   Vital Signs (last 24 hours)         01/06 0700  01/07 0659 01/07 0700 01/07 1520   Most Recent      Temp (°F) 96 -  97.4      98     98 (36.7) 01/07 0828    Heart Rate 60 -  71      66     66 01/07 0828    Resp 10 -  16      20     20 01/07 0828    BP 95/53 -  129/59      131/74     131/74 01/07 0828    SpO2 (%) 95 -  99      95     95 01/07 0828            Physical Exam:  Physical Exam  Vitals and nursing note reviewed.   Constitutional:       Appearance: She is well-developed.   HENT:      Head: Normocephalic and atraumatic.   Eyes:      Pupils: Pupils are equal, round, and reactive to light.   Cardiovascular:      Rate and Rhythm: Normal  rate and regular rhythm.      Heart sounds: Normal heart sounds.   Pulmonary:      Effort: Pulmonary effort is normal. No respiratory distress.      Breath sounds: Normal breath sounds. No wheezing or rales.   Abdominal:      General: Bowel sounds are normal. There is no distension.      Palpations: Abdomen is soft. There is no mass.      Tenderness: There is no abdominal tenderness. There is no guarding or rebound.   Musculoskeletal:         General: No deformity. Normal range of motion.      Cervical back: Normal range of motion and neck supple.   Skin:     General: Skin is warm.      Findings: No erythema or rash.   Neurological:      Mental Status: She is alert and oriented to person, place, and time.      Cranial Nerves: No cranial nerve deficit.         Microbiology  Microbiology Results (last 10 days)       Procedure Component Value - Date/Time    Urine Culture - Urine, Straight Cath [[id number]]  (Abnormal) Collected: 01/06/25 0035    Lab Status: Final result Specimen: Urine from Straight Cath Updated: 01/07/25 1035     Urine Culture 50,000 CFU/mL Streptococcus agalactiae (Group B)     Comment:   This organism is considered to be universally susceptible to penicillin.  No further antibiotic testing will be performed.       Narrative:      Colonization of the urinary tract without infection is common. Treatment is discouraged unless the patient is symptomatic, pregnant, or undergoing an invasive urologic procedure.    Blood Culture - Blood, Arm, Right [[id number]]  (Abnormal) Collected: 01/06/25 0033    Lab Status: Preliminary result Specimen: Blood from Arm, Right Updated: 01/07/25 1240     Blood Culture Streptococcus agalactiae (Group B)     Isolated from Aerobic and Anaerobic Bottles     Gram Stain Anaerobic Bottle Gram positive cocci in pairs and chains      Aerobic Bottle Gram positive cocci in pairs and chains    Narrative:      Less than seven (7) mL's of blood was collected.  Insufficient quantity may  yield false negative results.    Blood Culture - Blood, Arm, Left [[id number]]  (Abnormal) Collected: 01/06/25 0032    Lab Status: Preliminary result Specimen: Blood from Arm, Left Updated: 01/07/25 1238     Blood Culture Streptococcus agalactiae (Group B)     Isolated from Aerobic and Anaerobic Bottles     Gram Stain Anaerobic Bottle Gram positive cocci in pairs and chains      Aerobic Bottle Gram positive cocci in pairs and chains    Blood Culture ID, PCR - Blood, Arm, Left [[id number]]  (Abnormal) Collected: 01/06/25 0032    Lab Status: Final result Specimen: Blood from Arm, Left Updated: 01/06/25 1200     BCID, PCR Streptococcus agalactiae (Group B). Identification by BCID2 PCR.     BOTTLE TYPE Anaerobic Bottle    Respiratory Panel PCR w/COVID-19(SARS-CoV-2) ELISHA/MARQUEZ/HAMMAD/PAD/COR/SHARDA In-House, NP Swab in UTM/VTM, 2 HR TAT - Swab, Nasopharynx [[id number]]  (Normal) Collected: 01/06/25 0024    Lab Status: Final result Specimen: Swab from Nasopharynx Updated: 01/06/25 0130     ADENOVIRUS, PCR Not Detected     Coronavirus 229E Not Detected     Coronavirus HKU1 Not Detected     Coronavirus NL63 Not Detected     Coronavirus OC43 Not Detected     COVID19 Not Detected     Human Metapneumovirus Not Detected     Human Rhinovirus/Enterovirus Not Detected     Influenza A PCR Not Detected     Influenza B PCR Not Detected     Parainfluenza Virus 1 Not Detected     Parainfluenza Virus 2 Not Detected     Parainfluenza Virus 3 Not Detected     Parainfluenza Virus 4 Not Detected     RSV, PCR Not Detected     Bordetella pertussis pcr Not Detected     Bordetella parapertussis PCR Not Detected     Chlamydophila pneumoniae PCR Not Detected     Mycoplasma pneumo by PCR Not Detected    Narrative:      In the setting of a positive respiratory panel with a viral infection PLUS a negative procalcitonin without other underlying concern for bacterial infection, consider observing off antibiotics or discontinuation of antibiotics and continue  supportive care. If the respiratory panel is positive for atypical bacterial infection (Bordetella pertussis, Chlamydophila pneumoniae, or Mycoplasma pneumoniae), consider antibiotic de-escalation to target atypical bacterial infection.            Laboratory  Results from last 7 days   Lab Units 01/07/25  0453   WBC 10*3/mm3 4.64   HEMOGLOBIN g/dL 10.7*   HEMATOCRIT % 34.1   PLATELETS 10*3/mm3 36*     Results from last 7 days   Lab Units 01/07/25  0453   SODIUM mmol/L 135*   POTASSIUM mmol/L 3.1*   CHLORIDE mmol/L 108*   CO2 mmol/L 16.9*   BUN mg/dL 50*   CREATININE mg/dL 1.09*   GLUCOSE mg/dL 97   CALCIUM mg/dL 7.6*     Results from last 7 days   Lab Units 01/07/25  0453   SODIUM mmol/L 135*   POTASSIUM mmol/L 3.1*   CHLORIDE mmol/L 108*   CO2 mmol/L 16.9*   BUN mg/dL 50*   CREATININE mg/dL 1.09*   GLUCOSE mg/dL 97   CALCIUM mg/dL 7.6*         Results from last 7 days   Lab Units 01/03/25  1015   SED RATE mm/hr 49*           Radiology  Imaging Results (Last 72 Hours)       Procedure Component Value Units Date/Time    US Renal Bilateral [[id number]] Collected: 01/06/25 0932     Updated: 01/06/25 0935    Narrative:      US RENAL BILATERAL    Date of Exam: 1/6/2025 9:08 AM EST    Indication: JOSE, r/o obstruction.    Comparison: No comparisons available.    Technique: Grayscale and color Doppler ultrasound evaluation of the kidneys and urinary bladder was performed.      Findings:  Right kidney measures 9.8 cm length. Left kidney measures 9.7 cm length. Both kidneys are normal in echogenicity. No hydronephrosis. Grossly unremarkable incompletely distended urinary bladder      Impression:      Impression:  Normal ultrasound appearance of the kidneys with no evidence of obstructive uropathy        Electronically Signed: Roque Penn    1/6/2025 9:33 AM EST    Workstation ID: [id number]    CT Cervical Spine Without Contrast [[id number]] Collected: 01/06/25 0158     Updated: 01/06/25 0206    Narrative:      CT CERVICAL SPINE  WO CONTRAST    Date of Exam: 1/6/2025 1:09 AM EST    Indication: Neck pain after fall.    Comparison: 3/29/2023    Technique: Axial CT images were obtained of the cervical spine without contrast administration.  Sagittal and coronal reconstructions were performed.  Automated exposure control and iterative reconstruction methods were used.    Findings:  Patient is fused from C3-C7 with an anterior plate and screws from C3-C6. Cervical alignment is normal. There is multilevel facet arthropathy. There is disc space narrowing at C7-T1. There is scoliosis at the cervicothoracic junction. No acute cervical   spine fracture is identified. Paraspinal soft tissues are grossly normal.      Impression:      No acute cervical spine fracture. No significant interval change.      Electronically Signed: Brad Foster MD    1/6/2025 2:04 AM EST    Workstation ID: [id number]    CT Facial Bones Without Contrast [[id number]] Collected: 01/06/25 0154     Updated: 01/06/25 0200    Narrative:      CT FACIAL BONES WO CONTRAST    Date of Exam: 1/6/2025 1:09 AM EST    Indication: Fall with bruising.    Comparison: CT orbit 10/12/2018    Technique: Axial CT images were obtained from the inferior aspect of the mandible through the frontal sinuses without contrast administration.  Sagittal and coronal reconstructions were performed.  Automated exposure control and iterative reconstruction   methods were used.    Findings:  Temporomandibular joints demonstrate normal alignment. The mandible is intact. No acute facial bone fracture. No evidence of acute sinus disease. The globes and orbits appear grossly normal.      Impression:      Negative facial bone CT.        Electronically Signed: Brad Foster MD    1/6/2025 1:58 AM EST    Workstation ID: [id number]    CT Abdomen Pelvis Without Contrast [[id number]] Collected: 01/06/25 0144     Updated: 01/06/25 0158    Narrative:      CT ABDOMEN PELVIS WO CONTRAST    Date of Exam: 1/6/2025 1:09 AM  EST    Indication: lower abdominal pain, diarrhea.    Comparison: CT abdomen pelvis August 5, 2024    Technique: Axial CT images were obtained of the abdomen and pelvis without the administration of contrast. Sagittal and coronal reconstructions were performed.  Automated exposure control and iterative reconstruction methods were used.    Findings:  Lung Bases:     There is mild right middle lobe atelectasis. There is bilateral basilar atelectasis. The heart is enlarged. There are postsurgical changes of the pulmonary artery. Heavy coronary artery calcifications are seen.    Liver:  The liver is of normal size. There are no focal liver lesions with noncontrast technique.    Biliary/Gallbladder:    Layering density in the gallbladder may be stones or sludge. The biliary tree is normal.    Spleen:  There is mild splenomegaly.    Pancreas:    Pancreas is normal. There is no evidence of pancreatic mass or peripancreatic fluid.    Kidneys:    There is a nonobstructing stone of the left kidney. There is no hydronephrosis of either kidney.    Adrenals:    Adrenal glands are unremarkable.    Retroperitoneal/Lymph Nodes/Vasculature:    No retroperitoneal adenopathy is identified.    Gastrointestinal/Mesentery:    The bowel loops are non-dilated without wall thickening or mass. The appendix appears within normal limits. No evidence of obstruction. No free air. No mesenteric fluid collections identified.    Bladder:    The bladder is normal.    Genital:     Unremarkable          Bony Structures:     There are old compression fractures of T12 and L1. A benign hemangioma of L4 is unchanged. There is no acute fracture.        Impression:      Impression:  1.No acute abdominal or pelvic abnormality.  2.Mild splenomegaly.  3.Nonobstructing left renal stone.  4.Layering density in the gallbladder may be stones or sludge.                Electronically Signed: Rocky Heredia MD    1/6/2025 1:56 AM EST    Workstation ID: [id number]    CT  Head Without Contrast [[id number]] Collected: 01/06/25 0148     Updated: 01/06/25 0156    Narrative:      CT HEAD WO CONTRAST    HISTORY:  Head injury from fall. Bruising.    TECHNIQUE:  Axial unenhanced head CT with coronal reformats. Radiation dose reduction techniques included automated exposure control or exposure modulation based on body size.    COMPARISON:  11/2/2024, 11/5/2023    FINDINGS:  Ventricular size and configuration are normal. No acute infarct or hemorrhage is identified. There are no masses. There is no skull fracture. Atherosclerotic calcifications are present in the vertebral arteries and carotid siphons. Mild chronic small   vessel ischemic changes are present in the white matter.      Impression:      Senescent changes without acute abnormality.          Electronically Signed: Brad Foster MD    1/6/2025 1:54 AM EST    Workstation ID: [id number]    XR Chest 1 View [[id number]] Collected: 01/06/25 0036     Updated: 01/06/25 0040    Narrative:      XR CHEST 1 VW    Date of Exam: 1/6/2025 12:20 AM EST    Indication: cough, soa    Comparison: 11/2/2024    Findings:  There is a small stent graft in the proximal descending aorta. The heart remains enlarged status post CABG and valve repair. Left-sided multipolar pacer is present. Patient is status post mastectomy with bilateral breast reconstructions. Patient is also   status post cervical spinal fusion. No acute infiltrates are identified. No pneumothorax.      Impression:      1.Cardiomegaly with postoperative changes of CABG and valve repair.  2.No acute infiltrates identified.        Electronically Signed: Brad Foster MD    1/6/2025 12:38 AM EST    Workstation ID: [id number]            Cardiology      Results Review:  I have reviewed all clinical data, test, lab, and imaging results.       Schedule Meds  cefTRIAXone, 2,000 mg, Intravenous, Q24H  heparin (porcine), 5,000 Units, Subcutaneous, Q8H  insulin lispro, 2-7 Units, Subcutaneous, 4x Daily  AC & at Bedtime  pantoprazole, 40 mg, Oral, Q AM  potassium chloride ER, 40 mEq, Oral, Q4H  sodium chloride, 10 mL, Intravenous, Q12H        Infusion Meds       PRN Meds    Calcium Replacement - Follow Nurse / BPA Driven Protocol    dextrose    dextrose    glucagon (human recombinant)    HYDROcodone-acetaminophen    Magnesium Standard Dose Replacement - Follow Nurse / BPA Driven Protocol    nitroglycerin    ondansetron    Phosphorus Replacement - Follow Nurse / BPA Driven Protocol    Potassium Replacement - Follow Nurse / BPA Driven Protocol    sodium chloride    sodium chloride    sodium chloride      Assessment & Plan       Assessment    Group B streptococcus bacteremia.  Source is not very clear but to rule out endocarditis.  Patient has a pacemaker placed in the past and she had remote history of unclear cardiac surgery.  I believe during childhood secondary to congenital heart disease.    Status post pacemaker placement in the past and open heart surgery with possible valve replacement according to patient    Positive urine culture for group B streptococcus.  I believe this is descending infection.    Plan    Continue IV ceftriaxone 2 g daily  Discontinue IV Flagyl  Request 2D echo  Consult cardiology for GREGORY  A.m. labs    Anisha Pfeiffer MD  01/07/25  15:20 EST    Note is dictated utilizing voice recognition software/Dragon

## 2025-01-07 NOTE — PROGRESS NOTES
LOS: 1 day   Patient Care Team:  Chirag Robles DO as PCP - General (Family Medicine)  Мария Nunez MD as Consulting Physician (Hematology and Oncology)  Steven Hammond MD as Consulting Physician (Cardiology)      Subjective     Subjective: Patient reports not sleeping well overnight so very tired this morning.  Still complains of nausea and had 1 small episode of vomiting.  Only had 1 small bowel movement but no stool sample collected yet.  No further diarrhea.  Denies rectal bleeding.  Feeling hungry.      ROS:   Review of Systems   Constitutional:  Negative for chills and fever.   Respiratory:  Negative for cough and shortness of breath.    Cardiovascular:  Negative for chest pain.   Gastrointestinal:  Positive for nausea and vomiting. Negative for blood in stool.   Neurological:  Positive for weakness.   Psychiatric/Behavioral:  Negative for agitation and confusion.         Medication Review:   Scheduled Meds:cefTRIAXone, 2,000 mg, Intravenous, Q24H  heparin (porcine), 5,000 Units, Subcutaneous, Q8H  insulin lispro, 2-7 Units, Subcutaneous, 4x Daily AC & at Bedtime  metroNIDAZOLE, 500 mg, Intravenous, Q8H  pantoprazole, 40 mg, Oral, Q AM  potassium & sodium phosphates, 2 packet, Oral, Once  potassium & sodium phosphates, 2 packet, Oral, Once  potassium chloride ER, 40 mEq, Oral, Q4H  sodium chloride, 10 mL, Intravenous, Q12H      Continuous Infusions:   PRN Meds:.  Calcium Replacement - Follow Nurse / BPA Driven Protocol    dextrose    dextrose    glucagon (human recombinant)    HYDROcodone-acetaminophen    Magnesium Standard Dose Replacement - Follow Nurse / BPA Driven Protocol    nitroglycerin    ondansetron    Phosphorus Replacement - Follow Nurse / BPA Driven Protocol    Potassium Replacement - Follow Nurse / BPA Driven Protocol    sodium chloride    sodium chloride    sodium chloride      Objective     Vital Signs  Temp:  [96 °F (35.6 °C)-98 °F (36.7 °C)] 98 °F (36.7 °C)  Heart Rate:   [60-69] 66  Resp:  [10-20] 20  BP: (102-131)/(54-74) 131/74    Physical Exam:    General Appearance:    Awake and alert, in no acute distress but ill-appearing   Head:    Normocephalic, without obvious abnormality   Eyes:          Conjunctivae normal, anicteric sclera   Ears:    Hearing intact   Throat:   No oral lesions, no thrush, oral mucosa moist       Lungs:     Respirations regular, even and unlabored       Abdomen:     Soft, non-tender, no rebound or guarding, non-distended, no hepatosplenomegaly   Rectal:     Deferred   Extremities:   No edema, no cyanosis, no redness   Skin:   No bleeding, bruising or rash, no jaundice   Neurologic:   Sensation intact        Results Review:    CBC  Results from last 7 days   Lab Units 01/07/25  0453 01/06/25  0553 01/06/25  0022   RBC 10*6/mm3 3.58* 3.82 4.13   WBC 10*3/mm3 4.64 4.89 11.40*   HEMOGLOBIN g/dL 10.7* 11.5* 12.6   PLATELETS 10*3/mm3 36* 42* 52*       CMP  Results from last 7 days   Lab Units 01/07/25  0453 01/06/25  0553 01/06/25  0022   SODIUM mmol/L 135* 136 135*   POTASSIUM mmol/L 3.1* 3.2* 3.4*   CHLORIDE mmol/L 108* 106 100   CO2 mmol/L 16.9* 16.6* 19.5*   BUN mg/dL 50* 52* 56*   CREATININE mg/dL 1.09* 1.79* 2.05*   GLUCOSE mg/dL 97 116* 117*   ALBUMIN g/dL 2.8* 3.3* 3.2*   BILIRUBIN mg/dL 0.7 1.4* 1.4*   ALK PHOS U/L 39 43 64   AST (SGOT) U/L 36* 36* 46*   ALT (SGPT) U/L 17 18 24       Amylase and Lipase        CRP     Results from last 7 days   Lab Units 01/03/25  1015   CRP mg/dL 7.06*       Imaging Results (Last 24 Hours)       ** No results found for the last 24 hours. **              Assessment & Plan   Nausea/vomiting/diarrhea  Electrolyte abnormalities  Thrombocytopenia  Normocytic anemia  Elevated AST  Breast cancer    Plan:  64-year-old female with breast cancer and undergoing treatment admitted 1/5/2025 with acute nausea/vomiting/diarrhea.  No diarrhea since admission and stool studies still pending.    She complains of not getting much sleep  overnight so very tired this morning.  Still having some nausea but only 1 small episode of vomiting.  Had a small bowel movement but noted further diarrhea so stool studies still pending.  Suspect acute infectious etiology.  Electrolytes are being replaced.  Hemoglobin 10.7, platelets 36.  WBC normal.  Await stool study results.  Advance diet.  Continue supportive care.    Leatha Scott, APRN  01/07/25  10:17 EST

## 2025-01-07 NOTE — PROGRESS NOTES
Full consult to follow    Chart reviewed.   Nephrology service consulted for JOSE  Patient as per Chart review has GI symptoms  Likely JOSE from volume depletion.     Likely also has UTI  Will need repeat UA in a day or two once JOSE is better.  Agree with iv fluids.    Angelica morley

## 2025-01-07 NOTE — CONSULTS
Hematology/Oncology Inpatient Consultation    Patient name: Gladys Garrison  : 1960  MRN: [id number]  Referring Provider: Dr. Chapman  Reason for Consultation: History of metastatic breast cancer with thrombocytopenia    Chief complaint: Nausea, diarrhea, generalized weakness    History of present illness:    Gladys Garrison is a 64 y.o. female with past medical history significant for tetralogy of Fallot, coarctation of aorta, VSD status post repair, coarctation of aorta S/P stent who presented to [facility] on 2025 with complaints of generalized weakness.  He reports she has been having diarrhea for several days that is dark in color associated with nausea and multiple episodes of vomiting.  She has generalized abdominal cramps, subjective fever and chills and generalized weakness.  She also reports a fall, no loss of consciousness.  CT imaging of the head and facial bones with no acute findings or significant interval changes.  She was treated for a UTI about 1 month ago.  She also reports she has not urinated in several days.  Workup in ED significant for JOSE.  She was noted to have hypotension that slightly improved with IV fluids, temperature of 100.1 °F.  She was admitted for further evaluation and workup.    2025 CT abdomen pelvis without contrast showed no acute abdominal or pelvic abnormality.  Mild splenomegaly.  Nonobstructing left renal stone.  Layering density in the gallbladder may be stones versus sludge.  Renal ultrasound was completed that was normal with no evidence of obstructive uropathy.    Gastroenterology was consulted and recommending stool studies for C. difficile and gastrointestinal panel.    Urine cultures completed.  Respiratory panel negative.  Preliminary blood cultures with gram-positive cocci in pairs and chains.  Infectious disease has been consulted.    25  Hematology/Oncology was consulted.  She is established with Dr. Nunez for history of  metastatic breast cancer currently on exemestane/abemaciclib.     Patient has developed nausea vomiting along with diarrhea.  She denies fevers or chills.  She has been on abemaciclib.  She has not had any bleeding issues.  She still feels weak and ill    He/She  has a past medical history of Allergic, Bone cancer, Bradycardia, Breast cancer (2017), Cancer of unknown origin, Compression fx, thoracic spine, open, initial encounter, Coronary artery disease, COVID-19 (09/01/2021), Diabetes mellitus, Heart disease, unspecified, Hepatitis C, Hyperlipidemia, Hypertension, Obesity (BMI 30-39.9) (02/05/2021), Rib fracture, Sleep apnea, Tachycardia, and Type 2 diabetes mellitus (11/2017).    PCP: Chirag Robles DO    History:  Past Medical History:   Diagnosis Date    Allergic     Bone cancer     METASTATIC BONE; stage 4    Bradycardia     SECONDARY TO ABLATION    Breast cancer 2017    mets to lymph nodes; did not do radiation    Cancer of unknown origin     Compression fx, thoracic spine, open, initial encounter     Coronary artery disease     COVID-19 09/01/2021    Diabetes mellitus     Heart disease, unspecified     Hepatitis C     RESOLVED WITH MEDICATION    Hyperlipidemia     Hypertension     Obesity (BMI 30-39.9) 02/05/2021    Rib fracture     Per patient    Sleep apnea     no machine    Tachycardia     ATRIAL    Type 2 diabetes mellitus 11/2017   ,   Past Surgical History:   Procedure Laterality Date    BACK SURGERY      neck X 2    BREAST RECONSTRUCTION Bilateral     CARDIAC ABLATION       atrial tachycardia x 5 ablations     CARDIAC CATHETERIZATION      CARDIAC ELECTROPHYSIOLOGY PROCEDURE N/A 12/11/2023    Procedure: Pacemaker RV lead insertion with generator change out. [employer];  Surgeon: Steven Hammond MD;  Location: [facility] CATH INVASIVE LOCATION;  Service: Cardiovascular;  Laterality: N/A;    CARDIAC SURGERY      stent placed in aorta    CARDIAC SURGERY      6 surgeries as baby     CERVICAL FUSION  ANTERIOR WITH ARTIFICIAL DISCECTOMY IMPLANTATION N/A 2020    Procedure: C4 VERTEBRECTOMY AND ANTERIOR CERIVCAL DISCECTOMY WITH FUSION OF CERVICAL THREE THROUGH FIVE WITH REMOVAL OF HARDWARE C5-C6;  Surgeon: Mark Kaba MD;  Location: [facility] MAIN OR;  Service: Neurosurgery;  Laterality: N/A;     SECTION      x2    COLONOSCOPY N/A 10/23/2020    Procedure: COLONOSCOPY WITH POLYPECTOMY X6;  Surgeon: Stanley Bourne MD;  Location: [facility] ENDOSCOPY;  Service: Gastroenterology;  Laterality: N/A;  POLYPS, INTERNAL HEMORRHOIDS    ENDOMETRIAL ABLATION      REMOVAL SCAR TISSUE UTERINE    ENDOSCOPY N/A 10/23/2020    Procedure: ESOPHAGOGASTRODUODENOSCOPY WITH BIOPSY X 1 AREA;  Surgeon: Stanley Bourne MD;  Location: [facility] ENDOSCOPY;  Service: Gastroenterology;  Laterality: N/A;  GASTRITIS, ESOPHAGITIS, HIATAL HERNIA    INSERT / REPLACE / REMOVE PACEMAKER      KNEE SURGERY      MASTECTOMY Bilateral     NECK SURGERY      PACEMAKER IMPLANTATION      PAIN PUMP INSERTION/REVISION N/A 2022    Procedure: PAIN PUMP INSERTION AND INTRATHECAL CATHETER PLACEMENT;  Surgeon: Chuck Hunter MD;  Location: [facility] MAIN OR;  Service: Pain Management;  Laterality: N/A;   ,   Family History   Problem Relation Age of Onset    Heart disease Mother     Stroke Mother     Lung cancer Mother     Aneurysm Father     Diabetes Sister     No Known Problems Brother     No Known Problems Brother     Diabetes type I Half-Sister     Thyroid cancer Half-Sister     Cancer Maternal Aunt     Heart attack Sister     Thyroid disease Sister     No Known Problems Sister    ,   Social History     Tobacco Use    Smoking status: Never     Passive exposure: Never    Smokeless tobacco: Never   Vaping Use    Vaping status: Never Used   Substance Use Topics    Alcohol use: Yes     Comment: drinks rarely    Drug use: Never   ,   Medications Prior to Admission   Medication Sig Dispense Refill Last Dose/Taking     exemestane (AROMASIN) 25 MG tablet Take 1 tablet by mouth Daily.   Past Week    Morphine Sulfate, PF, 8 MG/ML solution 8.5 mg by Intrathecal Infusion route Daily. Receives 8 mg through pain pump q 24 hrs   1/5/2025    Abemaciclib (VERZENIO) 100 mg tablet chemo tablet Take 1 tablet by mouth 2 (Two) Times a Day. Take with or without food; Swallow whole, do not crush, chew or split tablets. 60 tablet 5 Unknown    Blood Glucose Monitoring Suppl (Accu-Chek Araceli) device Use as instructed   To test blood sugar bid 1 each 0 Unknown    Calcium Carbonate-Vit D-Min (Calcium 600+D Plus Minerals) 600-400 MG-UNIT chewable tablet Chew 600 mg 2 (Two) Times a Day. 60 each 6 Unknown    Continuous Blood Gluc  (FreeStyle Rajeev 14 Day [place]) device 1 each Daily. 1 each 0 Unknown    Continuous Blood Gluc Sensor (FreeStyle Rajeev 14 Day Sensor) misc 1 each Daily. 6 each 3 Unknown    cyclobenzaprine (FLEXERIL) 10 MG tablet Take 1 tablet by mouth 2 (Two) Times a Day As Needed for Muscle Spasms. 30 tablet 1 Unknown    diphenoxylate-atropine (LOMOTIL) 2.5-0.025 MG per tablet Take 1 tablet by mouth 3 (Three) Times a Day. 30 tablet 1 Unknown    famotidine (PEPCID) 20 MG tablet Take 1 tablet by mouth 2 (Two) Times a Day As Needed for Heartburn.       fluticasone (FLONASE) 50 MCG/ACT nasal spray Administer 2 sprays into the nostril(s) as directed by provider.       HYDROcodone-acetaminophen (NORCO)  MG per tablet Take 1 tablet by mouth Every 4 (Four) Hours As Needed for Moderate Pain. 180 tablet 0 Unknown    ibuprofen (ADVIL,MOTRIN) 800 MG tablet Take 1 tablet by mouth Every 6 (Six) Hours As Needed for Mild Pain. 90 tablet 2 Unknown    insulin NPH-insulin regular (humuLIN 70/30,novoLIN 70/30) (70-30) 100 UNIT/ML injection Inject 5 Units under the skin into the appropriate area as directed Every Evening. If BS over 180 15 mL 3 Unknown    Insulin Pen Needle (B-D UF III MINI PEN NEEDLES) 31G X 5 MM misc Use to inject insulin twice  "daily   DX: E11.9 100 each 0 Unknown    Insulin Syringe-Needle U-100 28G X 1/2\" 1 ML misc 1 each 2 (Two) Times a Day. 100 each 6 Unknown    losartan (Cozaar) 50 MG tablet Take 1 tablet by mouth Daily. Discontinue the lisinopril due to interaction with new chemotherapy 90 tablet 1     Naloxegol Oxalate (Movantik) 12.5 MG tablet Take 1 tablet by mouth Every Morning. 30 tablet 1     ondansetron ODT (ZOFRAN-ODT) 4 MG disintegrating tablet Place 2 tablets on the tongue Every 8 (Eight) Hours As Needed for Nausea or Vomiting. 30 tablet 3 Unknown    rosuvastatin (Crestor) 20 MG tablet Take 1 tablet by mouth Every Night. 90 tablet 0 Unknown   , Scheduled Meds:  cefTRIAXone, 2,000 mg, Intravenous, Q24H  heparin (porcine), 5,000 Units, Subcutaneous, Q8H  insulin lispro, 2-7 Units, Subcutaneous, 4x Daily AC & at Bedtime  metroNIDAZOLE, 500 mg, Intravenous, Q8H  pantoprazole, 40 mg, Oral, Q AM  potassium & sodium phosphates, 2 packet, Oral, Once  potassium & sodium phosphates, 2 packet, Oral, Once  potassium chloride ER, 40 mEq, Oral, Q4H  sodium chloride, 10 mL, Intravenous, Q12H    , Continuous Infusions:   , PRN Meds:    Calcium Replacement - Follow Nurse / BPA Driven Protocol    dextrose    dextrose    glucagon (human recombinant)    HYDROcodone-acetaminophen    Magnesium Standard Dose Replacement - Follow Nurse / BPA Driven Protocol    nitroglycerin    ondansetron    Phosphorus Replacement - Follow Nurse / BPA Driven Protocol    Potassium Replacement - Follow Nurse / BPA Driven Protocol    sodium chloride    sodium chloride    sodium chloride   Allergies:  Oxycodone, Promethazine, Tape, Adhesive tape, and Flexeril [cyclobenzaprine]    Subjective     ROS:  Review of Systems   Constitutional:  Positive for fatigue.   Gastrointestinal:  Positive for diarrhea and nausea.   Neurological:  Positive for weakness.        Objective   Vital Signs:   /74   Pulse 66   Temp 98 °F (36.7 °C)   Resp 20   Ht 152.4 cm (60\")   Wt " 46.3 kg (102 lb)   LMP  (LMP Unknown)   SpO2 95%   BMI 19.92 kg/m²     Physical Exam: (performed by MD)  Physical Exam  Vitals and nursing note reviewed.   Constitutional:       General: She is in acute distress.      Appearance: She is ill-appearing. She is not diaphoretic.   HENT:      Head: Normocephalic and atraumatic.   Eyes:      General: No scleral icterus.        Right eye: No discharge.         Left eye: No discharge.      Conjunctiva/sclera: Conjunctivae normal.   Neck:      Thyroid: No thyromegaly.   Cardiovascular:      Rate and Rhythm: Normal rate and regular rhythm.      Heart sounds: Normal heart sounds.      No friction rub. No gallop.   Pulmonary:      Effort: Pulmonary effort is normal. No respiratory distress.      Breath sounds: No stridor. No wheezing.   Abdominal:      General: Bowel sounds are normal.      Palpations: Abdomen is soft. There is no mass.      Tenderness: There is no abdominal tenderness. There is no guarding or rebound.   Musculoskeletal:         General: No tenderness. Normal range of motion.      Cervical back: Normal range of motion and neck supple.      Right lower leg: No edema.      Left lower leg: No edema.   Lymphadenopathy:      Cervical: No cervical adenopathy.   Skin:     General: Skin is warm.      Findings: No erythema or rash.   Neurological:      Mental Status: She is alert and oriented to person, place, and time.      Motor: No abnormal muscle tone.   Psychiatric:         Behavior: Behavior normal.         Results Review:  Lab Results (last 48 hours)       Procedure Component Value Units Date/Time    POC Glucose 4x Daily Before Meals & at Bedtime [[id number]]  (Normal) Collected: 01/07/25 0742    Specimen: Blood Updated: 01/07/25 0743     Glucose 101 mg/dL      Comment: Serial Number: [id number]:  [id number]       Pathology Consultation [[id number]] Collected: 01/06/25 0553    Specimen: Blood, Venous Line Updated: 01/07/25 0730    CBC & Differential  [[id number]]  (Abnormal) Collected: 01/07/25 0453    Specimen: Blood from Arm, Right Updated: 01/07/25 0640    Narrative:      The following orders were created for panel order CBC & Differential.  Procedure                               Abnormality         Status                     ---------                               -----------         ------                     CBC Auto Differential[[id number]]        Abnormal            Final result               Scan Slide[[id number]]                                       Final result                 Please view results for these tests on the individual orders.    CBC Auto Differential [[id number]]  (Abnormal) Collected: 01/07/25 0453    Specimen: Blood from Arm, Right Updated: 01/07/25 0640     WBC 4.64 10*3/mm3      RBC 3.58 10*6/mm3      Hemoglobin 10.7 g/dL      Hematocrit 34.1 %      MCV 95.3 fL      MCH 29.9 pg      MCHC 31.4 g/dL      RDW 14.9 %      RDW-SD 52.3 fl      MPV --     Comment: Unable to Calculate          Platelets 36 10*3/mm3     Scan Slide [[id number]] Collected: 01/07/25 0453    Specimen: Blood from Arm, Right Updated: 01/07/25 0640     Scan Slide --     Comment: See Manual Differential Results       Manual Differential [[id number]]  (Abnormal) Collected: 01/07/25 0453    Specimen: Blood from Arm, Right Updated: 01/07/25 0640     Neutrophil % 63.0 %      Lymphocyte % 17.0 %      Monocyte % 3.0 %      Eosinophil % 1.0 %      Bands %  14.0 %      Metamyelocyte % 1.0 %      Atypical Lymphocyte % 1.0 %      Neutrophils Absolute 3.57 10*3/mm3      Lymphocytes Absolute 0.84 10*3/mm3      Monocytes Absolute 0.14 10*3/mm3      Eosinophils Absolute 0.05 10*3/mm3      Anisocytosis Slight/1+     Crenated RBC's Slight/1+     Poikilocytes Slight/1+     Toxic Granulation Slight/1+     Platelet Estimate Decreased    Comprehensive Metabolic Panel [[id number]]  (Abnormal) Collected: 01/07/25 0453    Specimen: Blood from Arm, Right Updated: 01/07/25 0522     Glucose 97 mg/dL       BUN 50 mg/dL      Creatinine 1.09 mg/dL      Sodium 135 mmol/L      Potassium 3.1 mmol/L      Chloride 108 mmol/L      CO2 16.9 mmol/L      Calcium 7.6 mg/dL      Total Protein 5.4 g/dL      Albumin 2.8 g/dL      ALT (SGPT) 17 U/L      AST (SGOT) 36 U/L      Alkaline Phosphatase 39 U/L      Total Bilirubin 0.7 mg/dL      Globulin 2.6 gm/dL      A/G Ratio 1.1 g/dL      BUN/Creatinine Ratio 45.9     Anion Gap 10.1 mmol/L      eGFR 57.6 mL/min/1.73     Narrative:      GFR Categories in Chronic Kidney Disease (CKD)      GFR Category          GFR (mL/min/1.73)    Interpretation  G1                     90 or greater         Normal or high (1)  G2                      60-89                Mild decrease (1)  G3a                   45-59                Mild to moderate decrease  G3b                   30-44                Moderate to severe decrease  G4                    15-29                Severe decrease  G5                    14 or less           Kidney failure          (1)In the absence of evidence of kidney disease, neither GFR category G1 or G2 fulfill the criteria for CKD.    eGFR calculation 2021 CKD-EPI creatinine equation, which does not include race as a factor    Magnesium [[id number]]  (Normal) Collected: 01/07/25 0453    Specimen: Blood from Arm, Right Updated: 01/07/25 0522     Magnesium 2.1 mg/dL     Phosphorus [[id number]]  (Abnormal) Collected: 01/07/25 0453    Specimen: Blood from Arm, Right Updated: 01/07/25 0522     Phosphorus 2.0 mg/dL     POC Glucose Once [[id number]]  (Normal) Collected: 01/06/25 2104    Specimen: Blood Updated: 01/06/25 2105     Glucose 79 mg/dL      Comment: Serial Number: [id number]:  [id number]       POC Glucose Once [[id number]]  (Normal) Collected: 01/06/25 1608    Specimen: Blood Updated: 01/06/25 1609     Glucose 101 mg/dL      Comment: Serial Number: [id number]:  [id number]       Blood Culture - Blood, Arm, Left [[id number]]  (Abnormal) Collected: 01/06/25 0032     Specimen: Blood from Arm, Left Updated: 01/06/25 1235     Blood Culture Abnormal Stain     Gram Stain Anaerobic Bottle Gram positive cocci in pairs and chains      Aerobic Bottle Gram positive cocci in pairs and chains    Blood Culture - Blood, Arm, Right [[id number]]  (Abnormal) Collected: 01/06/25 0033    Specimen: Blood from Arm, Right Updated: 01/06/25 1201     Blood Culture Abnormal Stain     Gram Stain Anaerobic Bottle Gram positive cocci in pairs and chains      Aerobic Bottle Gram positive cocci in pairs and chains    Narrative:      Less than seven (7) mL's of blood was collected.  Insufficient quantity may yield false negative results.    Blood Culture ID, PCR - Blood, Arm, Left [[id number]]  (Abnormal) Collected: 01/06/25 0032    Specimen: Blood from Arm, Left Updated: 01/06/25 1200     BCID, PCR Streptococcus agalactiae (Group B). Identification by BCID2 PCR.     BOTTLE TYPE Anaerobic Bottle    POC Glucose 4x Daily Before Meals & at Bedtime [[id number]]  (Normal) Collected: 01/06/25 1127    Specimen: Blood Updated: 01/06/25 1129     Glucose 91 mg/dL      Comment: Serial Number: [id number]:  [id number]       Scan Slide [[id number]] Collected: 01/06/25 0553    Specimen: Blood Updated: 01/06/25 0723     Scan Slide --     Comment: See Manual Differential Results       Manual Differential [[id number]]  (Abnormal) Collected: 01/06/25 0553    Specimen: Blood Updated: 01/06/25 0723     Neutrophil % 64.0 %      Lymphocyte % 0.0 %      Monocyte % 4.0 %      Bands %  18.0 %      Metamyelocyte % 12.0 %      Atypical Lymphocyte % 2.0 %      Neutrophils Absolute 4.01 10*3/mm3      Lymphocytes Absolute 0.10 10*3/mm3      Monocytes Absolute 0.20 10*3/mm3      Crenated RBC's Slight/1+     Dacrocytes Slight/1+     Poikilocytes Slight/1+     Toxic Granulation Mod/2+     Vacuolated Neutrophils Mod/2+     Platelet Estimate Decreased     Giant Platelets Slight/1+    Path Consult Reflex [[id number]] Collected: 01/06/25 0545     Specimen: Blood Updated: 01/06/25 0723     Pathology Review Yes    CBC Auto Differential [[id number]]  (Abnormal) Collected: 01/06/25 0553    Specimen: Blood Updated: 01/06/25 0723     WBC 4.89 10*3/mm3      RBC 3.82 10*6/mm3      Hemoglobin 11.5 g/dL      Hematocrit 36.4 %      MCV 95.3 fL      MCH 30.1 pg      MCHC 31.6 g/dL      RDW 14.6 %      RDW-SD 51.6 fl      MPV 12.6 fL      Platelets 42 10*3/mm3     Magnesium [[id number]]  (Normal) Collected: 01/06/25 0553    Specimen: Blood Updated: 01/06/25 0619     Magnesium 2.0 mg/dL     Comprehensive Metabolic Panel [[id number]]  (Abnormal) Collected: 01/06/25 0553    Specimen: Blood Updated: 01/06/25 0619     Glucose 116 mg/dL      BUN 52 mg/dL      Creatinine 1.79 mg/dL      Sodium 136 mmol/L      Potassium 3.2 mmol/L      Chloride 106 mmol/L      CO2 16.6 mmol/L      Calcium 7.7 mg/dL      Total Protein 6.2 g/dL      Albumin 3.3 g/dL      ALT (SGPT) 18 U/L      AST (SGOT) 36 U/L      Alkaline Phosphatase 43 U/L      Total Bilirubin 1.4 mg/dL      Globulin 2.9 gm/dL      A/G Ratio 1.1 g/dL      BUN/Creatinine Ratio 29.1     Anion Gap 13.4 mmol/L      eGFR 31.7 mL/min/1.73     Narrative:      GFR Categories in Chronic Kidney Disease (CKD)      GFR Category          GFR (mL/min/1.73)    Interpretation  G1                     90 or greater         Normal or high (1)  G2                      60-89                Mild decrease (1)  G3a                   45-59                Mild to moderate decrease  G3b                   30-44                Moderate to severe decrease  G4                    15-29                Severe decrease  G5                    14 or less           Kidney failure          (1)In the absence of evidence of kidney disease, neither GFR category G1 or G2 fulfill the criteria for CKD.    eGFR calculation 2021 CKD-EPI creatinine equation, which does not include race as a factor    Phosphorus [[id number]]  (Abnormal) Collected: 01/06/25 0553    Specimen:  Blood Updated: 01/06/25 0619     Phosphorus 2.4 mg/dL     Respiratory Panel PCR w/COVID-19(SARS-CoV-2) ELISHA/MARQUEZ/HAMMAD/PAD/COR/SHARDA In-House, NP Swab in UTM/VTM, 2 HR TAT - Swab, Nasopharynx [[id number]]  (Normal) Collected: 01/06/25 0024    Specimen: Swab from Nasopharynx Updated: 01/06/25 0130     ADENOVIRUS, PCR Not Detected     Coronavirus 229E Not Detected     Coronavirus HKU1 Not Detected     Coronavirus NL63 Not Detected     Coronavirus OC43 Not Detected     COVID19 Not Detected     Human Metapneumovirus Not Detected     Human Rhinovirus/Enterovirus Not Detected     Influenza A PCR Not Detected     Influenza B PCR Not Detected     Parainfluenza Virus 1 Not Detected     Parainfluenza Virus 2 Not Detected     Parainfluenza Virus 3 Not Detected     Parainfluenza Virus 4 Not Detected     RSV, PCR Not Detected     Bordetella pertussis pcr Not Detected     Bordetella parapertussis PCR Not Detected     Chlamydophila pneumoniae PCR Not Detected     Mycoplasma pneumo by PCR Not Detected    Narrative:      In the setting of a positive respiratory panel with a viral infection PLUS a negative procalcitonin without other underlying concern for bacterial infection, consider observing off antibiotics or discontinuation of antibiotics and continue supportive care. If the respiratory panel is positive for atypical bacterial infection (Bordetella pertussis, Chlamydophila pneumoniae, or Mycoplasma pneumoniae), consider antibiotic de-escalation to target atypical bacterial infection.    CBC & Differential [[id number]]  (Abnormal) Collected: 01/06/25 0022    Specimen: Blood Updated: 01/06/25 0059    Narrative:      The following orders were created for panel order CBC & Differential.  Procedure                               Abnormality         Status                     ---------                               -----------         ------                     CBC Auto Differential[[id number]]        Abnormal            Final result                Scan Slide[[id number]]                                       Final result                 Please view results for these tests on the individual orders.    CBC Auto Differential [[id number]]  (Abnormal) Collected: 01/06/25 0022    Specimen: Blood Updated: 01/06/25 0059     WBC 11.40 10*3/mm3      RBC 4.13 10*6/mm3      Hemoglobin 12.6 g/dL      Hematocrit 37.8 %      MCV 91.5 fL      MCH 30.5 pg      MCHC 33.3 g/dL      RDW 14.5 %      RDW-SD 48.8 fl      MPV 11.0 fL      Platelets 52 10*3/mm3     Scan Slide [[id number]] Collected: 01/06/25 0022    Specimen: Blood Updated: 01/06/25 0059     Scan Slide --     Comment: See Manual Differential Results       Manual Differential [[id number]]  (Abnormal) Collected: 01/06/25 0022    Specimen: Blood Updated: 01/06/25 0059     Neutrophil % 80.0 %      Lymphocyte % 1.0 %      Monocyte % 1.0 %      Bands %  14.0 %      Metamyelocyte % 2.0 %      Atypical Lymphocyte % 2.0 %      Neutrophils Absolute 10.72 10*3/mm3      Lymphocytes Absolute 0.34 10*3/mm3      Monocytes Absolute 0.11 10*3/mm3      Dacrocytes Slight/1+     Poikilocytes Slight/1+     RBC Fragments Slight/1+     WBC Morphology Normal     Platelet Estimate Decreased     Large Platelets Slight/1+     Giant Platelets Slight/1+    Urinalysis, Microscopic Only - Straight Cath [[id number]]  (Abnormal) Collected: 01/06/25 0035    Specimen: Urine from Straight Cath Updated: 01/06/25 0049     RBC, UA 3-5 /HPF      WBC, UA 21-50 /HPF      Bacteria, UA 1+ /HPF      Squamous Epithelial Cells, UA 13-20 /HPF      Transitional Epithelial Cells, UA 3-6 /HPF      Hyaline Casts, UA 0-2 /LPF      Methodology Automated Microscopy    Urine Culture - Urine, Straight Cath [[id number]] Collected: 01/06/25 0035    Specimen: Urine from Straight Cath Updated: 01/06/25 0049    Comprehensive Metabolic Panel [[id number]]  (Abnormal) Collected: 01/06/25 0022    Specimen: Blood Updated: 01/06/25 0048     Glucose 117 mg/dL      BUN 56 mg/dL       Creatinine 2.05 mg/dL      Sodium 135 mmol/L      Potassium 3.4 mmol/L      Comment: Slight hemolysis detected by analyzer. Result may be falsely elevated.        Chloride 100 mmol/L      CO2 19.5 mmol/L      Calcium 9.0 mg/dL      Total Protein 6.6 g/dL      Albumin 3.2 g/dL      ALT (SGPT) 24 U/L      AST (SGOT) 46 U/L      Alkaline Phosphatase 64 U/L      Total Bilirubin 1.4 mg/dL      Globulin 3.4 gm/dL      A/G Ratio 0.9 g/dL      BUN/Creatinine Ratio 27.3     Anion Gap 15.5 mmol/L      eGFR 27.0 mL/min/1.73     Narrative:      GFR Categories in Chronic Kidney Disease (CKD)      GFR Category          GFR (mL/min/1.73)    Interpretation  G1                     90 or greater         Normal or high (1)  G2                      60-89                Mild decrease (1)  G3a                   45-59                Mild to moderate decrease  G3b                   30-44                Moderate to severe decrease  G4                    15-29                Severe decrease  G5                    14 or less           Kidney failure          (1)In the absence of evidence of kidney disease, neither GFR category G1 or G2 fulfill the criteria for CKD.    eGFR calculation 2021 CKD-EPI creatinine equation, which does not include race as a factor    Urinalysis With Culture If Indicated - Straight Cath [[id number]]  (Abnormal) Collected: 01/06/25 0035    Specimen: Urine from Straight Cath Updated: 01/06/25 0045     Color, UA Dark Yellow     Appearance, UA Cloudy     pH, UA <=5.0     Specific Gravity, UA 1.025     Comment: Result obtained by Refractometer        Glucose,  mg/dL (Trace)     Ketones, UA 15 mg/dL (1+)     Bilirubin, UA Large (3+)     Comment: Confirmation testing is unavailable.  A serum bilirubin is recommended for further assessment.        Blood, UA Negative     Protein, UA >=300 mg/dL (3+)     Leuk Esterase, UA Small (1+)     Nitrite, UA Negative     Urobilinogen, UA 1.0 E.U./dL    Narrative:      In absence of  clinical symptoms, the presence of pyuria, bacteria, and/or nitrites on the urinalysis result does not correlate with infection.    POC Lactate [[id number]]  (Normal) Collected: 01/06/25 0037    Specimen: Blood Updated: 01/06/25 0040     Lactate 1.5 mmol/L      Comment: Serial Number: [id number]:  [id number]       [facility] [place] [facility] Draw [[id number]] Collected: 01/06/25 0022    Specimen: Blood Updated: 01/06/25 0031    Narrative:      The following orders were created for panel order [facility] Draw.  Procedure                               Abnormality         Status                     ---------                               -----------         ------                     Green Top (Gel)[[id number]]                                  Final result               Lavender Top[[id number]]                                     Final result               Gold Top - SST[[id number]]                                   Final result               Light Blue Top[[id number]]                                   Final result                 Please view results for these tests on the individual orders.    Green Top (Gel) [[id number]] Collected: 01/06/25 0022    Specimen: Blood Updated: 01/06/25 0031     Extra Tube Hold for add-ons.     Comment: Auto resulted.       Lavender Top [[id number]] Collected: 01/06/25 0022    Specimen: Blood Updated: 01/06/25 0031     Extra Tube hold for add-on     Comment: Auto resulted       Gold Top - SST [[id number]] Collected: 01/06/25 0022    Specimen: Blood Updated: 01/06/25 0031     Extra Tube Hold for add-ons.     Comment: Auto resulted.       Light Blue Top [[id number]] Collected: 01/06/25 0022    Specimen: Blood Updated: 01/06/25 0031     Extra Tube Hold for add-ons.     Comment: Auto resulted                Pending Results:     Imaging Reviewed:   US Renal Bilateral    Result Date: 1/6/2025  Impression: Normal ultrasound appearance of the kidneys with no evidence of obstructive uropathy Electronically Signed: Roque  Penn  1/6/2025 9:33 AM EST  Workstation ID: [id number]    CT Cervical Spine Without Contrast    Result Date: 1/6/2025  No acute cervical spine fracture. No significant interval change. Electronically Signed: Brad Foster MD  1/6/2025 2:04 AM EST  Workstation ID: [id number]    CT Facial Bones Without Contrast    Result Date: 1/6/2025  Negative facial bone CT. Electronically Signed: Brad Foster MD  1/6/2025 1:58 AM EST  Workstation ID: [id number]    CT Abdomen Pelvis Without Contrast    Result Date: 1/6/2025  Impression: 1.No acute abdominal or pelvic abnormality. 2.Mild splenomegaly. 3.Nonobstructing left renal stone. 4.Layering density in the gallbladder may be stones or sludge. Electronically Signed: Rocky Heredia MD  1/6/2025 1:56 AM EST  Workstation ID: [id number]    CT Head Without Contrast    Result Date: 1/6/2025  Senescent changes without acute abnormality. Electronically Signed: Brad Foster MD  1/6/2025 1:54 AM EST  Workstation ID: [id number]    XR Chest 1 View    Result Date: 1/6/2025  1.Cardiomegaly with postoperative changes of CABG and valve repair. 2.No acute infiltrates identified. Electronically Signed: Brad Foster MD  1/6/2025 12:38 AM EST  Workstation ID: [id number]          Assessment & Plan   ASSESSMENT  Metastatic breast cancer presenting as 1.1 cm mixed lytic/sclerotic hypermetabolic lesion in the left hemisacrum suspicious for metastatic disease 1.2 cm sclerotic lesion on L4, small L3 lesion and T11 lesion.  Tumor is ER positive, CA positive and HER-2/bishop negative.  Currently on treatment with abemaciclib (100 mg daily - recent dose reduction due to thrombocytopenia and recurrent UTIs) and exemestane 25 mg daily.  Thrombocytopenia secondary to abemaciclib: Platelets 52,000 upon admission.  Today down to 36,000.  Acute drop may be secondary to acute infection.  Continue to monitor closely.  Hypotension due to volume depletion, JOSE, diarrhea: nephrology following. Supportive care and  infectious workup per primary team.  Bacteremia with possible UTI: Empirically started on ceftriaxone    PLAN  Continue supportive care per primary team and infectious disease  GI panel  Will hold abemaciclib until clinical improvement.   Continue to monitor CBC daily  Further recommendations per Dr. Nunez      Above note was prepared for Dr. Ella Nunez -physical exam and review of systems were performed by physician.    Electronically signed by Nicole Mann PA-C, 01/07/25    Thank you for this consult. We will be happy to follow along with you.     This is a 64-year-old female has a history of metastatic breast cancer currently on CDK 4 inhibitor with abemaciclib along with exemestane.  Patient is currently receiving full doses of abemaciclib.  She comes into the hospital with complaints of nausea vomiting diarrhea.  She was found to have significant cytopenias especially with low platelets less than 40,000 but she denies any bleeding issues.  She feels weak  Physical exam she appears ill otherwise she has no focal findings on her exam  Reviewed her labs, imaging studies progress Notes  Recommend to hold abemaciclib for now  Stool studies  Blood cultures positive.  ID has been consulted  GI has been consulted  Continue IV fluids  Supportive care  Will follow along with you      Electronically signed by Мария Nunez MD, 01/08/25, 5:39 PM EST.

## 2025-01-08 LAB
ALBUMIN SERPL-MCNC: 3 G/DL (ref 3.5–5.2)
ALBUMIN/GLOB SERPL: 1.1 G/DL
ALP SERPL-CCNC: 52 U/L (ref 39–117)
ALT SERPL W P-5'-P-CCNC: 14 U/L (ref 1–33)
ANION GAP SERPL CALCULATED.3IONS-SCNC: 7.3 MMOL/L (ref 5–15)
AST SERPL-CCNC: 33 U/L (ref 1–32)
BACTERIA SPEC AEROBE CULT: ABNORMAL
BACTERIA SPEC AEROBE CULT: ABNORMAL
BASOPHILS # BLD AUTO: 0.01 10*3/MM3 (ref 0–0.2)
BASOPHILS NFR BLD AUTO: 0.3 % (ref 0–1.5)
BILIRUB SERPL-MCNC: 0.5 MG/DL (ref 0–1.2)
BUN SERPL-MCNC: 39 MG/DL (ref 8–23)
BUN/CREAT SERPL: 45.3 (ref 7–25)
CALCIUM SPEC-SCNC: 8.7 MG/DL (ref 8.6–10.5)
CHLORIDE SERPL-SCNC: 107 MMOL/L (ref 98–107)
CO2 SERPL-SCNC: 19.7 MMOL/L (ref 22–29)
CREAT SERPL-MCNC: 0.86 MG/DL (ref 0.57–1)
DEPRECATED RDW RBC AUTO: 50.4 FL (ref 37–54)
EGFRCR SERPLBLD CKD-EPI 2021: 76.5 ML/MIN/1.73
EOSINOPHIL # BLD AUTO: 0.01 10*3/MM3 (ref 0–0.4)
EOSINOPHIL NFR BLD AUTO: 0.3 % (ref 0.3–6.2)
ERYTHROCYTE [DISTWIDTH] IN BLOOD BY AUTOMATED COUNT: 14.7 % (ref 12.3–15.4)
GLOBULIN UR ELPH-MCNC: 2.7 GM/DL
GLUCOSE BLDC GLUCOMTR-MCNC: 108 MG/DL (ref 70–105)
GLUCOSE BLDC GLUCOMTR-MCNC: 129 MG/DL (ref 70–105)
GLUCOSE BLDC GLUCOMTR-MCNC: 129 MG/DL (ref 70–105)
GLUCOSE BLDC GLUCOMTR-MCNC: 148 MG/DL (ref 70–105)
GLUCOSE SERPL-MCNC: 113 MG/DL (ref 65–99)
GRAM STN SPEC: ABNORMAL
HCT VFR BLD AUTO: 33.7 % (ref 34–46.6)
HGB BLD-MCNC: 10.8 G/DL (ref 12–15.9)
IMM GRANULOCYTES # BLD AUTO: 0.11 10*3/MM3 (ref 0–0.05)
IMM GRANULOCYTES NFR BLD AUTO: 2.9 % (ref 0–0.5)
ISOLATED FROM: ABNORMAL
ISOLATED FROM: ABNORMAL
LYMPHOCYTES # BLD AUTO: 0.4 10*3/MM3 (ref 0.7–3.1)
LYMPHOCYTES NFR BLD AUTO: 10.5 % (ref 19.6–45.3)
MCH RBC QN AUTO: 29.8 PG (ref 26.6–33)
MCHC RBC AUTO-ENTMCNC: 32 G/DL (ref 31.5–35.7)
MCV RBC AUTO: 92.8 FL (ref 79–97)
MONOCYTES # BLD AUTO: 0.61 10*3/MM3 (ref 0.1–0.9)
MONOCYTES NFR BLD AUTO: 16.1 % (ref 5–12)
NEUTROPHILS NFR BLD AUTO: 2.66 10*3/MM3 (ref 1.7–7)
NEUTROPHILS NFR BLD AUTO: 69.9 % (ref 42.7–76)
NRBC BLD AUTO-RTO: 0 /100 WBC (ref 0–0.2)
PHOSPHATE SERPL-MCNC: 1.7 MG/DL (ref 2.5–4.5)
PHOSPHATE SERPL-MCNC: 2.4 MG/DL (ref 2.5–4.5)
PLATELET # BLD AUTO: 43 10*3/MM3 (ref 140–450)
PMV BLD AUTO: 11.5 FL (ref 6–12)
POTASSIUM SERPL-SCNC: 3.6 MMOL/L (ref 3.5–5.2)
POTASSIUM SERPL-SCNC: 4 MMOL/L (ref 3.5–5.2)
PROT SERPL-MCNC: 5.7 G/DL (ref 6–8.5)
RBC # BLD AUTO: 3.63 10*6/MM3 (ref 3.77–5.28)
SODIUM SERPL-SCNC: 134 MMOL/L (ref 136–145)
WBC NRBC COR # BLD AUTO: 3.8 10*3/MM3 (ref 3.4–10.8)

## 2025-01-08 PROCEDURE — 36410 VNPNXR 3YR/> PHY/QHP DX/THER: CPT

## 2025-01-08 PROCEDURE — 99232 SBSQ HOSP IP/OBS MODERATE 35: CPT | Performed by: INTERNAL MEDICINE

## 2025-01-08 PROCEDURE — 85025 COMPLETE CBC W/AUTO DIFF WBC: CPT | Performed by: NURSE PRACTITIONER

## 2025-01-08 PROCEDURE — 25010000002 ONDANSETRON PER 1 MG: Performed by: STUDENT IN AN ORGANIZED HEALTH CARE EDUCATION/TRAINING PROGRAM

## 2025-01-08 PROCEDURE — 84100 ASSAY OF PHOSPHORUS: CPT | Performed by: INTERNAL MEDICINE

## 2025-01-08 PROCEDURE — 87040 BLOOD CULTURE FOR BACTERIA: CPT | Performed by: NURSE PRACTITIONER

## 2025-01-08 PROCEDURE — 25010000002 HEPARIN (PORCINE) PER 1000 UNITS: Performed by: STUDENT IN AN ORGANIZED HEALTH CARE EDUCATION/TRAINING PROGRAM

## 2025-01-08 PROCEDURE — 82948 REAGENT STRIP/BLOOD GLUCOSE: CPT

## 2025-01-08 PROCEDURE — 25810000003 LACTATED RINGERS PER 1000 ML: Performed by: HOSPITALIST

## 2025-01-08 PROCEDURE — 25810000003 SODIUM CHLORIDE 0.9 % SOLUTION: Performed by: INTERNAL MEDICINE

## 2025-01-08 PROCEDURE — 25010000002 CEFTRIAXONE PER 250 MG: Performed by: STUDENT IN AN ORGANIZED HEALTH CARE EDUCATION/TRAINING PROGRAM

## 2025-01-08 PROCEDURE — C1751 CATH, INF, PER/CENT/MIDLINE: HCPCS

## 2025-01-08 PROCEDURE — 80053 COMPREHEN METABOLIC PANEL: CPT | Performed by: NURSE PRACTITIONER

## 2025-01-08 PROCEDURE — 84132 ASSAY OF SERUM POTASSIUM: CPT | Performed by: INTERNAL MEDICINE

## 2025-01-08 RX ORDER — SODIUM CHLORIDE 0.9 % (FLUSH) 0.9 %
10 SYRINGE (ML) INJECTION EVERY 12 HOURS SCHEDULED
Status: DISCONTINUED | OUTPATIENT
Start: 2025-01-08 | End: 2025-01-13 | Stop reason: HOSPADM

## 2025-01-08 RX ORDER — SODIUM CHLORIDE, SODIUM LACTATE, POTASSIUM CHLORIDE, CALCIUM CHLORIDE 600; 310; 30; 20 MG/100ML; MG/100ML; MG/100ML; MG/100ML
75 INJECTION, SOLUTION INTRAVENOUS CONTINUOUS
Status: DISPENSED | OUTPATIENT
Start: 2025-01-08 | End: 2025-01-08

## 2025-01-08 RX ORDER — POTASSIUM CHLORIDE 1500 MG/1
40 TABLET, EXTENDED RELEASE ORAL EVERY 4 HOURS
Status: COMPLETED | OUTPATIENT
Start: 2025-01-08 | End: 2025-01-08

## 2025-01-08 RX ORDER — FENTANYL/ROPIVACAINE/NS/PF 2-625MCG/1
15 PLASTIC BAG, INJECTION (ML) EPIDURAL ONCE
Status: COMPLETED | OUTPATIENT
Start: 2025-01-08 | End: 2025-01-08

## 2025-01-08 RX ORDER — SODIUM CHLORIDE 0.9 % (FLUSH) 0.9 %
10 SYRINGE (ML) INJECTION AS NEEDED
Status: DISCONTINUED | OUTPATIENT
Start: 2025-01-08 | End: 2025-01-13 | Stop reason: HOSPADM

## 2025-01-08 RX ORDER — SODIUM CHLORIDE 9 MG/ML
40 INJECTION, SOLUTION INTRAVENOUS AS NEEDED
Status: DISCONTINUED | OUTPATIENT
Start: 2025-01-08 | End: 2025-01-13 | Stop reason: HOSPADM

## 2025-01-08 RX ADMIN — CEFTRIAXONE 2000 MG: 2 INJECTION, POWDER, FOR SOLUTION INTRAMUSCULAR; INTRAVENOUS at 08:50

## 2025-01-08 RX ADMIN — HEPARIN SODIUM 5000 UNITS: 5000 INJECTION INTRAVENOUS; SUBCUTANEOUS at 13:07

## 2025-01-08 RX ADMIN — POTASSIUM CHLORIDE 40 MEQ: 1500 TABLET, EXTENDED RELEASE ORAL at 08:50

## 2025-01-08 RX ADMIN — SODIUM CHLORIDE 15 MMOL: 9 INJECTION, SOLUTION INTRAVENOUS at 08:50

## 2025-01-08 RX ADMIN — HEPARIN SODIUM 5000 UNITS: 5000 INJECTION INTRAVENOUS; SUBCUTANEOUS at 20:57

## 2025-01-08 RX ADMIN — POTASSIUM CHLORIDE 40 MEQ: 1500 TABLET, EXTENDED RELEASE ORAL at 12:20

## 2025-01-08 RX ADMIN — HEPARIN SODIUM 5000 UNITS: 5000 INJECTION INTRAVENOUS; SUBCUTANEOUS at 05:56

## 2025-01-08 RX ADMIN — HYDROCODONE BITARTRATE AND ACETAMINOPHEN 1 TABLET: 10; 325 TABLET ORAL at 05:56

## 2025-01-08 RX ADMIN — SODIUM CHLORIDE, SODIUM LACTATE, POTASSIUM CHLORIDE, AND CALCIUM CHLORIDE 75 ML/HR: .6; .31; .03; .02 INJECTION, SOLUTION INTRAVENOUS at 11:15

## 2025-01-08 RX ADMIN — Medication 10 ML: at 08:50

## 2025-01-08 RX ADMIN — Medication 10 ML: at 20:57

## 2025-01-08 RX ADMIN — PANTOPRAZOLE SODIUM 40 MG: 40 TABLET, DELAYED RELEASE ORAL at 05:56

## 2025-01-08 RX ADMIN — Medication 10 ML: at 20:58

## 2025-01-08 RX ADMIN — ONDANSETRON 4 MG: 2 INJECTION INTRAMUSCULAR; INTRAVENOUS at 23:03

## 2025-01-08 RX ADMIN — HYDROCODONE BITARTRATE AND ACETAMINOPHEN 1 TABLET: 10; 325 TABLET ORAL at 10:12

## 2025-01-08 NOTE — PROGRESS NOTES
Hematology/Oncology Inpatient Progress Note    PATIENT NAME: Gladys Garrison  : 1960  MRN: [id number]    CHIEF COMPLAINT: Nausea, vomiting, diarrhea    HISTORY OF PRESENT ILLNESS:      Gladys Garrison is a 64 y.o. female with past medical history significant for tetralogy of Fallot, coarctation of aorta, VSD status post repair, coarctation of aorta S/P stent who presented to [facility] on 2025 with complaints of generalized weakness.  He reports she has been having diarrhea for several days that is dark in color associated with nausea and multiple episodes of vomiting.  She has generalized abdominal cramps, subjective fever and chills and generalized weakness.  She also reports a fall, no loss of consciousness.  CT imaging of the head and facial bones with no acute findings or significant interval changes.  She was treated for a UTI about 1 month ago.  She also reports she has not urinated in several days.  Workup in ED significant for JOSE.  She was noted to have hypotension that slightly improved with IV fluids, temperature of 100.1 °F.  She was admitted for further evaluation and workup.    2025 CT abdomen pelvis without contrast showed no acute abdominal or pelvic abnormality.  Mild splenomegaly.  Nonobstructing left renal stone.  Layering density in the gallbladder may be stones versus sludge.  Renal ultrasound was completed that was normal with no evidence of obstructive uropathy.    Gastroenterology was consulted and recommending stool studies for C. difficile and gastrointestinal panel.    Urine cultures completed.  Respiratory panel negative.  Preliminary blood cultures with gram-positive cocci in pairs and chains.  Infectious disease has been consulted.    25  Hematology/Oncology was consulted.  She is established with Dr. Nunez for history of metastatic breast cancer currently on exemestane/abemaciclib.     Patient has developed nausea vomiting along with diarrhea.   She denies fevers or chills.  She has been on abemaciclib.  She has not had any bleeding issues.  She still feels weak and ill      Subjective     She feels slightly better today.  Currently being seen by infectious disease    ROS:  Review of Systems   Constitutional:  Positive for fatigue. Negative for chills and fever.   HENT:  Negative for congestion, drooling, ear discharge, rhinorrhea, sinus pressure and tinnitus.    Eyes:  Negative for photophobia, pain and discharge.   Respiratory:  Negative for apnea, choking and stridor.    Cardiovascular:  Negative for palpitations.   Gastrointestinal:  Negative for abdominal distention, abdominal pain and anal bleeding.   Endocrine: Negative for polydipsia and polyphagia.   Genitourinary:  Negative for decreased urine volume, flank pain and genital sores.   Musculoskeletal:  Negative for gait problem, neck pain and neck stiffness.   Skin:  Negative for color change, rash and wound.   Neurological:  Positive for weakness. Negative for tremors, seizures, syncope, facial asymmetry and speech difficulty.   Hematological:  Negative for adenopathy.   Psychiatric/Behavioral:  Negative for agitation, confusion, hallucinations and self-injury. The patient is not hyperactive.         MEDICATIONS:        Scheduled Meds:  cefTRIAXone, 2,000 mg, Intravenous, Q24H  heparin (porcine), 5,000 Units, Subcutaneous, Q8H  insulin lispro, 2-7 Units, Subcutaneous, 4x Daily AC & at Bedtime  pantoprazole, 40 mg, Oral, Q AM  sodium chloride, 10 mL, Intravenous, Q12H  sodium chloride, 10 mL, Intravenous, Q12H       Continuous Infusions:      PRN Meds:    Calcium Replacement - Follow Nurse / BPA Driven Protocol    dextrose    dextrose    glucagon (human recombinant)    HYDROcodone-acetaminophen    Magnesium Standard Dose Replacement - Follow Nurse / BPA Driven Protocol    nitroglycerin    ondansetron    Phosphorus Replacement - Follow Nurse / BPA Driven Protocol    Potassium Replacement - Follow  "Nurse / BPA Driven Protocol    sodium chloride    sodium chloride    sodium chloride    sodium chloride    sodium chloride     ALLERGIES:    Allergies   Allergen Reactions    Oxycodone Nausea And Vomiting    Promethazine Other (See Comments)     Hyperactive mean    Tape Other (See Comments)     .blisters      Adhesive Tape Rash    Flexeril [Cyclobenzaprine] Rash       Objective    VITALS:   /62 (BP Location: Right arm, Patient Position: Lying)   Pulse 60   Temp 98 °F (36.7 °C) (Oral)   Resp 17   Ht 152.4 cm (60\")   Wt 46.3 kg (102 lb)   LMP  (LMP Unknown)   SpO2 98%   BMI 19.92 kg/m²     PHYSICAL EXAM: (performed by MD)  Physical Exam  Vitals and nursing note reviewed.   Constitutional:       General: She is not in acute distress.     Appearance: She is not diaphoretic.   HENT:      Head: Normocephalic and atraumatic.   Eyes:      General: No scleral icterus.        Right eye: No discharge.         Left eye: No discharge.      Conjunctiva/sclera: Conjunctivae normal.   Neck:      Thyroid: No thyromegaly.   Cardiovascular:      Rate and Rhythm: Normal rate and regular rhythm.      Heart sounds: Normal heart sounds.      No friction rub. No gallop.   Pulmonary:      Effort: Pulmonary effort is normal. No respiratory distress.      Breath sounds: No stridor. No wheezing.   Abdominal:      General: Bowel sounds are normal.      Palpations: Abdomen is soft. There is no mass.      Tenderness: There is no abdominal tenderness. There is no guarding or rebound.   Musculoskeletal:         General: No tenderness. Normal range of motion.      Cervical back: Normal range of motion and neck supple.   Lymphadenopathy:      Cervical: No cervical adenopathy.   Skin:     General: Skin is warm.      Findings: No erythema or rash.   Neurological:      Mental Status: She is alert and oriented to person, place, and time.      Motor: No abnormal muscle tone.   Psychiatric:         Behavior: Behavior normal.           RECENT " LABS:  Lab Results (last 24 hours)       Procedure Component Value Units Date/Time    POC Glucose Once [[id number]]  (Abnormal) Collected: 01/08/25 1649    Specimen: Blood Updated: 01/08/25 1651     Glucose 129 mg/dL      Comment: Serial Number: [id number]:  [id number]       Phosphorus [[id number]]  (Abnormal) Collected: 01/08/25 1514    Specimen: Blood Updated: 01/08/25 1552     Phosphorus 2.4 mg/dL     Potassium [[id number]]  (Normal) Collected: 01/08/25 1514    Specimen: Blood Updated: 01/08/25 1549     Potassium 4.0 mmol/L     POC Glucose Once [[id number]]  (Abnormal) Collected: 01/08/25 1123    Specimen: Blood Updated: 01/08/25 1124     Glucose 148 mg/dL      Comment: Serial Number: [id number]:  [id number]       Blood Culture - Blood, Hand, Right [[id number]] Collected: 01/08/25 1112    Specimen: Blood from Hand, Right Updated: 01/08/25 1121    POC Glucose Once [[id number]]  (Abnormal) Collected: 01/08/25 0731    Specimen: Blood Updated: 01/08/25 0733     Glucose 108 mg/dL      Comment: Serial Number: [id number]:  [id number]       Blood Culture - Blood, Arm, Right [[id number]]  (Abnormal) Collected: 01/06/25 0033    Specimen: Blood from Arm, Right Updated: 01/08/25 0707     Blood Culture Streptococcus agalactiae (Group B)     Isolated from Aerobic and Anaerobic Bottles     Gram Stain Anaerobic Bottle Gram positive cocci in pairs and chains      Aerobic Bottle Gram positive cocci in pairs and chains    Narrative:      Refer to previous blood culture collected on 01/06/2025 at 0032 for MICs.        Less than seven (7) mL's of blood was collected.  Insufficient quantity may yield false negative results.    Blood Culture - Blood, Arm, Left [[id number]]  (Abnormal)  (Susceptibility) Collected: 01/06/25 0032    Specimen: Blood from Arm, Left Updated: 01/08/25 0706     Blood Culture Streptococcus agalactiae (Group B)     Isolated from Aerobic and Anaerobic Bottles     Gram Stain Anaerobic Bottle Gram  positive cocci in pairs and chains      Aerobic Bottle Gram positive cocci in pairs and chains    Susceptibility        Streptococcus agalactiae (Group B)      MICK      Ceftriaxone Susceptible      Penicillin G Susceptible      Vancomycin Susceptible                           Phosphorus [[id number]]  (Abnormal) Collected: 01/08/25 0401    Specimen: Blood from Arm, Right Updated: 01/08/25 0515     Phosphorus 1.7 mg/dL     Comprehensive Metabolic Panel [[id number]]  (Abnormal) Collected: 01/08/25 0401    Specimen: Blood from Arm, Right Updated: 01/08/25 0513     Glucose 113 mg/dL      BUN 39 mg/dL      Creatinine 0.86 mg/dL      Sodium 134 mmol/L      Potassium 3.6 mmol/L      Chloride 107 mmol/L      CO2 19.7 mmol/L      Calcium 8.7 mg/dL      Total Protein 5.7 g/dL      Albumin 3.0 g/dL      ALT (SGPT) 14 U/L      AST (SGOT) 33 U/L      Alkaline Phosphatase 52 U/L      Total Bilirubin 0.5 mg/dL      Globulin 2.7 gm/dL      A/G Ratio 1.1 g/dL      BUN/Creatinine Ratio 45.3     Anion Gap 7.3 mmol/L      eGFR 76.5 mL/min/1.73     Narrative:      GFR Categories in Chronic Kidney Disease (CKD)      GFR Category          GFR (mL/min/1.73)    Interpretation  G1                     90 or greater         Normal or high (1)  G2                      60-89                Mild decrease (1)  G3a                   45-59                Mild to moderate decrease  G3b                   30-44                Moderate to severe decrease  G4                    15-29                Severe decrease  G5                    14 or less           Kidney failure          (1)In the absence of evidence of kidney disease, neither GFR category G1 or G2 fulfill the criteria for CKD.    eGFR calculation 2021 CKD-EPI creatinine equation, which does not include race as a factor    CBC & Differential [[id number]]  (Abnormal) Collected: 01/08/25 0401    Specimen: Blood from Arm, Right Updated: 01/08/25 0512    Narrative:      The following orders were  created for panel order CBC & Differential.  Procedure                               Abnormality         Status                     ---------                               -----------         ------                     CBC Auto Differential[[id number]]        Abnormal            Final result               Scan Slide[[id number]]                                                                    Please view results for these tests on the individual orders.    CBC Auto Differential [[id number]]  (Abnormal) Collected: 01/08/25 0401    Specimen: Blood from Arm, Right Updated: 01/08/25 0512     WBC 3.80 10*3/mm3      RBC 3.63 10*6/mm3      Hemoglobin 10.8 g/dL      Hematocrit 33.7 %      MCV 92.8 fL      MCH 29.8 pg      MCHC 32.0 g/dL      RDW 14.7 %      RDW-SD 50.4 fl      MPV 11.5 fL      Platelets 43 10*3/mm3      Comment: Parameter reviewed in the past 30 days on [id number].          Neutrophil % 69.9 %      Lymphocyte % 10.5 %      Monocyte % 16.1 %      Eosinophil % 0.3 %      Basophil % 0.3 %      Immature Grans % 2.9 %      Neutrophils, Absolute 2.66 10*3/mm3      Lymphocytes, Absolute 0.40 10*3/mm3      Monocytes, Absolute 0.61 10*3/mm3      Eosinophils, Absolute 0.01 10*3/mm3      Basophils, Absolute 0.01 10*3/mm3      Immature Grans, Absolute 0.11 10*3/mm3      nRBC 0.0 /100 WBC     POC Glucose Once [[id number]]  (Abnormal) Collected: 01/07/25 2100    Specimen: Blood Updated: 01/07/25 2102     Glucose 127 mg/dL      Comment: Serial Number: [id number]:  [id number]       Protein / Creatinine Ratio, Urine - Urine, Clean Catch [[id number]]  (Abnormal) Collected: 01/07/25 1513    Specimen: Urine, Clean Catch Updated: 01/07/25 1955     Protein/Creatinine Ratio, Urine 429.1 mg/G Crea      Creatinine, Urine 78.3 mg/dL      Total Protein, Urine 33.6 mg/dL             PENDING RESULTS:     IMAGING REVIEWED:  No radiology results for the last day    Assessment & Plan   ASSESSMENT:    :Metastatic breast cancer presenting as  1.1 cm mixed lytic/sclerotic hypermetabolic lesion in the left hemisacrum suspicious for metastatic disease 1.2 cm sclerotic lesion on L4, small L3 lesion and T11 lesion.  Tumor is ER positive, GA positive and HER-2/bishop negative.  Currently on treatment with abemaciclib (100 mg daily - recent dose reduction due to thrombocytopenia and recurrent UTIs) and exemestane 25 mg daily.  Abemaciclib is on hold  Thrombocytopenia secondary to abemaciclib: Platelets 52,000 upon admission.  Today down to 36,000.  Acute drop may be secondary to acute infection.  Continue to monitor closely..  Platelets up to 40,000.  There is no bleeding  Hypotension due to volume depletion, JOSE, diarrhea: nephrology following. Supportive care and infectious workup per primary team.  Group B streptococcal bacteremia with possible UTI: Empirically started on ceftriaxone     PLAN  Continue supportive care per primary team and infectious disease  Continue IV antibiotics  GI panel  Will hold abemaciclib until clinical improvement.   Daily CBCs  Discussed with patient              Electronically signed by Мария Nunez MD, 01/08/25, 5:42 PM EST.

## 2025-01-08 NOTE — PROGRESS NOTES
Infectious Diseases Progress Note      LOS: 2 days   Patient Care Team:  Chirag Robles DO as PCP - General (Family Medicine)  Мария Nunez MD as Consulting Physician (Hematology and Oncology)  Steven Hammond MD as Consulting Physician (Cardiology)    Chief Complaint: Nausea, vomiting, generalized pain    Subjective       The patient has been afebrile for the last 24 hours.  The patient is on room air, hemodynamically stable, and is tolerating antimicrobial therapy.  Patient is currently in the chair      Review of Systems:   Review of Systems   Constitutional: Negative.  Positive for fatigue.   HENT: Negative.     Eyes: Negative.    Respiratory: Negative.     Cardiovascular: Negative.    Gastrointestinal: Negative.  Positive for nausea.   Endocrine: Negative.    Genitourinary: Negative.    Musculoskeletal: Negative.         Generalized pain   Skin: Negative.    Neurological: Negative.    Psychiatric/Behavioral: Negative.     All other systems reviewed and are negative.       Objective     Vital Signs  Temp:  [97.6 °F (36.4 °C)-98 °F (36.7 °C)] 98 °F (36.7 °C)  Heart Rate:  [60-74] 60  Resp:  [15-21] 17  BP: (112-130)/(56-95) 117/62    Physical Exam:  Physical Exam  Vitals and nursing note reviewed.   Constitutional:       General: She is not in acute distress.     Appearance: She is well-developed and normal weight. She is ill-appearing. She is not diaphoretic.   HENT:      Head: Normocephalic and atraumatic.   Eyes:      General: No scleral icterus.     Extraocular Movements: Extraocular movements intact.      Conjunctiva/sclera: Conjunctivae normal.      Pupils: Pupils are equal, round, and reactive to light.   Cardiovascular:      Rate and Rhythm: Normal rate and regular rhythm.      Heart sounds: Normal heart sounds, S1 normal and S2 normal. No murmur heard.  Pulmonary:      Effort: Pulmonary effort is normal. No respiratory distress.      Breath sounds: Normal breath sounds. No stridor.  No wheezing or rales.   Chest:      Chest wall: No tenderness.   Abdominal:      General: Bowel sounds are normal. There is no distension.      Palpations: Abdomen is soft. There is no mass.      Tenderness: There is no abdominal tenderness. There is no guarding.   Musculoskeletal:         General: No swelling, tenderness or deformity. Normal range of motion.      Cervical back: Neck supple.   Skin:     General: Skin is warm and dry.      Coloration: Skin is not pale.      Findings: No bruising, erythema or rash.   Neurological:      General: No focal deficit present.      Mental Status: She is alert and oriented to person, place, and time. Mental status is at baseline.      Cranial Nerves: No cranial nerve deficit.   Psychiatric:         Mood and Affect: Mood normal.          Results Review:    I have reviewed all clinical data, test, lab, and imaging results.     Radiology  No Radiology Exams Resulted Within Past 24 Hours    Cardiology    Laboratory    Results from last 7 days   Lab Units 01/08/25  0401 01/07/25  0453 01/06/25  0553 01/06/25  0022   WBC 10*3/mm3 3.80 4.64 4.89 11.40*   HEMOGLOBIN g/dL 10.8* 10.7* 11.5* 12.6   HEMATOCRIT % 33.7* 34.1 36.4 37.8   PLATELETS 10*3/mm3 43* 36* 42* 52*     Results from last 7 days   Lab Units 01/08/25  0401 01/07/25  0453 01/06/25  0553 01/06/25  0022   SODIUM mmol/L 134* 135* 136 135*   POTASSIUM mmol/L 3.6 3.1* 3.2* 3.4*   CHLORIDE mmol/L 107 108* 106 100   CO2 mmol/L 19.7* 16.9* 16.6* 19.5*   BUN mg/dL 39* 50* 52* 56*   CREATININE mg/dL 0.86 1.09* 1.79* 2.05*   GLUCOSE mg/dL 113* 97 116* 117*   ALBUMIN g/dL 3.0* 2.8* 3.3* 3.2*   BILIRUBIN mg/dL 0.5 0.7 1.4* 1.4*   ALK PHOS U/L 52 39 43 64   AST (SGOT) U/L 33* 36* 36* 46*   ALT (SGPT) U/L 14 17 18 24   CALCIUM mg/dL 8.7 7.6* 7.7* 9.0         Results from last 7 days   Lab Units 01/03/25  1015   SED RATE mm/hr 49*         Microbiology   Microbiology Results (last 10 days)       Procedure Component Value - Date/Time     Urine Culture - Urine, Straight Cath [[id number]]  (Abnormal) Collected: 01/06/25 0035    Lab Status: Final result Specimen: Urine from Straight Cath Updated: 01/07/25 1035     Urine Culture 50,000 CFU/mL Streptococcus agalactiae (Group B)     Comment:   This organism is considered to be universally susceptible to penicillin.  No further antibiotic testing will be performed.       Narrative:      Colonization of the urinary tract without infection is common. Treatment is discouraged unless the patient is symptomatic, pregnant, or undergoing an invasive urologic procedure.    Blood Culture - Blood, Arm, Right [[id number]]  (Abnormal) Collected: 01/06/25 0033    Lab Status: Final result Specimen: Blood from Arm, Right Updated: 01/08/25 0707     Blood Culture Streptococcus agalactiae (Group B)     Isolated from Aerobic and Anaerobic Bottles     Gram Stain Anaerobic Bottle Gram positive cocci in pairs and chains      Aerobic Bottle Gram positive cocci in pairs and chains    Narrative:      Refer to previous blood culture collected on 01/06/2025 at 0032 for MICs.        Less than seven (7) mL's of blood was collected.  Insufficient quantity may yield false negative results.    Blood Culture - Blood, Arm, Left [[id number]]  (Abnormal)  (Susceptibility) Collected: 01/06/25 0032    Lab Status: Final result Specimen: Blood from Arm, Left Updated: 01/08/25 0706     Blood Culture Streptococcus agalactiae (Group B)     Isolated from Aerobic and Anaerobic Bottles     Gram Stain Anaerobic Bottle Gram positive cocci in pairs and chains      Aerobic Bottle Gram positive cocci in pairs and chains    Susceptibility        Streptococcus agalactiae (Group B)      MICK      Ceftriaxone Susceptible      Penicillin G Susceptible      Vancomycin Susceptible                           Blood Culture ID, PCR - Blood, Arm, Left [[id number]]  (Abnormal) Collected: 01/06/25 0032    Lab Status: Final result Specimen: Blood from Arm, Left Updated:  01/06/25 1200     BCID, PCR Streptococcus agalactiae (Group B). Identification by BCID2 PCR.     BOTTLE TYPE Anaerobic Bottle    Respiratory Panel PCR w/COVID-19(SARS-CoV-2) [facility] In-House, NP Swab in UTM/VTM, 2 HR TAT - Swab, Nasopharynx [[id number]]  (Normal) Collected: 01/06/25 0024    Lab Status: Final result Specimen: Swab from Nasopharynx Updated: 01/06/25 0130     ADENOVIRUS, PCR Not Detected     Coronavirus 229E Not Detected     Coronavirus HKU1 Not Detected     Coronavirus NL63 Not Detected     Coronavirus OC43 Not Detected     COVID19 Not Detected     Human Metapneumovirus Not Detected     Human Rhinovirus/Enterovirus Not Detected     Influenza A PCR Not Detected     Influenza B PCR Not Detected     Parainfluenza Virus 1 Not Detected     Parainfluenza Virus 2 Not Detected     Parainfluenza Virus 3 Not Detected     Parainfluenza Virus 4 Not Detected     RSV, PCR Not Detected     Bordetella pertussis pcr Not Detected     Bordetella parapertussis PCR Not Detected     Chlamydophila pneumoniae PCR Not Detected     Mycoplasma pneumo by PCR Not Detected    Narrative:      In the setting of a positive respiratory panel with a viral infection PLUS a negative procalcitonin without other underlying concern for bacterial infection, consider observing off antibiotics or discontinuation of antibiotics and continue supportive care. If the respiratory panel is positive for atypical bacterial infection (Bordetella pertussis, Chlamydophila pneumoniae, or Mycoplasma pneumoniae), consider antibiotic de-escalation to target atypical bacterial infection.            Medication Review:       Schedule Meds  cefTRIAXone, 2,000 mg, Intravenous, Q24H  heparin (porcine), 5,000 Units, Subcutaneous, Q8H  insulin lispro, 2-7 Units, Subcutaneous, 4x Daily AC & at Bedtime  pantoprazole, 40 mg, Oral, Q AM  sodium chloride, 10 mL, Intravenous, Q12H        Infusion Meds       PRN Meds    Calcium Replacement - Follow Nurse /  BPA Driven Protocol    dextrose    dextrose    glucagon (human recombinant)    HYDROcodone-acetaminophen    Magnesium Standard Dose Replacement - Follow Nurse / BPA Driven Protocol    nitroglycerin    ondansetron    Phosphorus Replacement - Follow Nurse / BPA Driven Protocol    Potassium Replacement - Follow Nurse / BPA Driven Protocol    sodium chloride    sodium chloride    sodium chloride        Assessment & Plan       Antimicrobial Therapy   1.  IV ceftriaxone        2.        3.        4.        5.          Assessment     Group B streptococcus bacteremia.  Source is not very clear but to rule out endocarditis.  Patient has a pacemaker placed in the past and she had remote history of unclear cardiac surgery.  I believe during childhood secondary to congenital heart disease.    History of congenital heart disease/tetralogy of Fallot s/p surgery during childhood.    Positive urine culture for group B streptococcus.  I believe this is descending infection.    Metastatic breast cancer currently on targeted biologic therapy (abemaciiclib)     Thrombocytopenia-likely related to the above     Plan     Continue IV ceftriaxone 2 g daily-patient will need at least 2 weeks of treatment  Repeat blood culture  Request 2D echo-pending  Consult cardiology for GREGORY-scheduled for tomorrow  Continue supportive care  A.m. labs  Case discussed with cardiology NP    Bria Cooney, APRN  01/08/25  14:48 EST    Note is dictated utilizing voice recognition software/Dragon

## 2025-01-08 NOTE — CONSULTS
[facility]  Cardiology progress note  Stanley Lopez MD, PhD      Subjective:     Encounter Date:01/05/2025      Patient ID: Gladys Garrison is a 64 y.o. female.    Chief Complaint: diarrhea, nausea, generalized weakness  Cardiology Consult: request for GREGORY    Referring Physician: Dr. Pfeiffer  Seen and examined by me agree with narrative as discussed with nurse practitioner after face-to-face encounter scruff findings below greater than 50% of total encounter, medical decision making performed by me    HPI:  Gladys Garrison is a 64 y.o. female who presents with nausea, diarrhea, generalized weakness. Ms. Garrison is a patient of Dr. Lopez. 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 She underwent device exchange with Medtronic generator December 2023, Medtronic leads were confirmed for RV and RA leads which were placed back in 2007.  She also has metastatic breast cancer diagnosed 2017 with bone mets diagnosed in 2021, history of bilateral mastectomy.  Under therapy evaluation also pain management from oncology standpoint.   She also history of COVID 2021 she was admitted recently in February 2023 with chest pain atypical nature thought to be secondary to metastatic disease.   She has no history of obstructive CAD or coronary intervention.      Last 2D echo March 2023 revealed normal EF 55 to 60% with grade 1 diastolic dysfunction, severely reduced RV systolic function with moderately dilated RV and RA, moderate bileaflet mitral valve thickening with severe TR, mildly elevated pulmonary pressures 35-45, trivial pericardial effusion.      She presents this admission with nausea, diarrhea, weakness. She is noted to have group B  streptococcus bacteremia with unclear source. ID has requested a GREGORY from cardiology as patient has PPM in place and h/o cardiac surgery as child for CHD. She remains on IV antibiotics. Will arrange GREGORY.     Historical data copied forward from previous encounters in EMR is unchanged  Review of systems otherwise negative x 14 point review of systems except as mentioned above    EKG shows Paced V rhythm      Past Medical History:   Diagnosis Date    Allergic     Bone cancer     METASTATIC BONE; stage 4    Bradycardia     SECONDARY TO ABLATION    Breast cancer     mets to lymph nodes; did not do radiation    Cancer of unknown origin     Compression fx, thoracic spine, open, initial encounter     Coronary artery disease     COVID-19 2021    Diabetes mellitus     Heart disease, unspecified     Hepatitis C     RESOLVED WITH MEDICATION    Hyperlipidemia     Hypertension     Obesity (BMI 30-39.9) 2021    Rib fracture     Per patient    Sleep apnea     no machine    Tachycardia     ATRIAL    Type 2 diabetes mellitus 2017       Past Surgical History:   Procedure Laterality Date    BACK SURGERY      neck X 2    BREAST RECONSTRUCTION Bilateral     CARDIAC ABLATION       atrial tachycardia x 5 ablations     CARDIAC CATHETERIZATION      CARDIAC ELECTROPHYSIOLOGY PROCEDURE N/A 2023    Procedure: Pacemaker RV lead insertion with generator change out. MYRtronic;  Surgeon: Steven Hammond MD;  Location: [facility] CATH INVASIVE LOCATION;  Service: Cardiovascular;  Laterality: N/A;    CARDIAC SURGERY      stent placed in aorta    CARDIAC SURGERY      6 surgeries as baby     CERVICAL FUSION ANTERIOR WITH ARTIFICIAL DISCECTOMY IMPLANTATION N/A 2020    Procedure: C4 VERTEBRECTOMY AND ANTERIOR CERIVCAL DISCECTOMY WITH FUSION OF CERVICAL THREE THROUGH FIVE WITH REMOVAL OF HARDWARE C5-C6;  Surgeon: Mark Kaba MD;  Location: [facility] MAIN OR;  Service: Neurosurgery;  Laterality: N/A;     SECTION       x2    COLONOSCOPY N/A 10/23/2020    Procedure: COLONOSCOPY WITH POLYPECTOMY X6;  Surgeon: Stanley Bourne MD;  Location: [facility] ENDOSCOPY;  Service: Gastroenterology;  Laterality: N/A;  POLYPS, INTERNAL HEMORRHOIDS    ENDOMETRIAL ABLATION      REMOVAL SCAR TISSUE UTERINE    ENDOSCOPY N/A 10/23/2020    Procedure: ESOPHAGOGASTRODUODENOSCOPY WITH BIOPSY X 1 AREA;  Surgeon: Stanley Bourne MD;  Location: [facility] ENDOSCOPY;  Service: Gastroenterology;  Laterality: N/A;  GASTRITIS, ESOPHAGITIS, HIATAL HERNIA    INSERT / REPLACE / REMOVE PACEMAKER      KNEE SURGERY  2000    MASTECTOMY Bilateral     NECK SURGERY      PACEMAKER IMPLANTATION      PAIN PUMP INSERTION/REVISION N/A 04/05/2022    Procedure: PAIN PUMP INSERTION AND INTRATHECAL CATHETER PLACEMENT;  Surgeon: Chuck Hunter MD;  Location: [facility] MAIN OR;  Service: Pain Management;  Laterality: N/A;       Family History   Problem Relation Age of Onset    Heart disease Mother     Stroke Mother     Lung cancer Mother     Aneurysm Father     Diabetes Sister     No Known Problems Brother     No Known Problems Brother     Diabetes type I Half-Sister     Thyroid cancer Half-Sister     Cancer Maternal Aunt     Heart attack Sister     Thyroid disease Sister     No Known Problems Sister        Social History     Socioeconomic History    Marital status: Legally     Number of children: 2   Tobacco Use    Smoking status: Never     Passive exposure: Never    Smokeless tobacco: Never   Vaping Use    Vaping status: Never Used   Substance and Sexual Activity    Alcohol use: Yes     Comment: drinks rarely    Drug use: Never    Sexual activity: Defer         Allergies   Allergen Reactions    Oxycodone Nausea And Vomiting    Promethazine Other (See Comments)     Hyperactive mean    Tape Other (See Comments)     .blisters      Adhesive Tape Rash    Flexeril [Cyclobenzaprine] Rash       Current Medications:   Scheduled Meds:cefTRIAXone, 2,000 mg,  "Intravenous, Q24H  heparin (porcine), 5,000 Units, Subcutaneous, Q8H  insulin lispro, 2-7 Units, Subcutaneous, 4x Daily AC & at Bedtime  pantoprazole, 40 mg, Oral, Q AM  sodium chloride, 10 mL, Intravenous, Q12H      Continuous Infusions:lactated ringers, 75 mL/hr, Last Rate: 75 mL/hr (01/08/25 1115)        Review of Systems   Constitutional: Positive for malaise/fatigue. Negative for chills and diaphoresis.   Cardiovascular:  Negative for chest pain, dyspnea on exertion, irregular heartbeat, leg swelling, near-syncope, orthopnea, palpitations, paroxysmal nocturnal dyspnea and syncope.   Respiratory:  Negative for cough, shortness of breath, sleep disturbances due to breathing and sputum production.    Gastrointestinal:  Positive for diarrhea and nausea. Negative for change in bowel habit.   Genitourinary:  Negative for urgency.   Neurological:  Negative for dizziness and headaches.   Psychiatric/Behavioral:  Negative for altered mental status.             Objective:         /62 (BP Location: Right arm, Patient Position: Lying)   Pulse 60   Temp 98 °F (36.7 °C) (Oral)   Resp 17   Ht 152.4 cm (60\")   Wt 46.3 kg (102 lb)   LMP  (LMP Unknown)   SpO2 98%   BMI 19.92 kg/m²     Physical Exam:  General Appearance:    Alert, cooperative, in no acute distress                                Head: Atraumatic, normocephalic, PERRLA               Neck:   supple, trachea midline, no thyromegaly, no carotid bruit, no JVD   Lungs:     Clear to auscultation,respirations regular, even and               unlabored    Heart:    Regular rhythm and normal rate, normal S1 and S2   Abdomen:     Normal bowel sounds, no masses, no organomegaly, soft  nontender, nondistended, no guarding, no rebound  tenderness   Extremities:   Moves all extremities well, no edema, no cyanosis, no  redness   Pulses:   Pulses palpable and equal bilaterally   Skin:   No bleeding, bruising or rash   Neurologic:   Awake, alert, oriented x3 " "    Unchanged from prior            ASCVD Risk Score::  The 10-year ASCVD risk score (Kyle SHANKAR, et al., 2019) is: 8.1%    Values used to calculate the score:      Age: 64 years      Sex: Female      Is Non- : No      Diabetic: Yes      Tobacco smoker: No      Systolic Blood Pressure: 117 mmHg      Is BP treated: Yes      HDL Cholesterol: 51 mg/dL      Total Cholesterol: 171 mg/dL      Lab Review:     Results from last 7 days   Lab Units 01/08/25  0401 01/07/25  0453   SODIUM mmol/L 134* 135*   POTASSIUM mmol/L 3.6 3.1*   CHLORIDE mmol/L 107 108*   CO2 mmol/L 19.7* 16.9*   BUN mg/dL 39* 50*   CREATININE mg/dL 0.86 1.09*   GLUCOSE mg/dL 113* 97   CALCIUM mg/dL 8.7 7.6*   AST (SGOT) U/L 33* 36*   ALT (SGPT) U/L 14 17         Results from last 7 days   Lab Units 01/08/25  0401 01/07/25  0453   WBC 10*3/mm3 3.80 4.64   HEMOGLOBIN g/dL 10.8* 10.7*   HEMATOCRIT % 33.7* 34.1   PLATELETS 10*3/mm3 43* 36*         Results from last 7 days   Lab Units 01/07/25  0453 01/06/25  0553   MAGNESIUM mg/dL 2.1 2.0           Invalid input(s): \"LDLCALC\"            Recent Radiology:  Imaging Results (Most Recent)       Procedure Component Value Units Date/Time    US Renal Bilateral [[id number]] Collected: 01/06/25 0932     Updated: 01/06/25 0935    Narrative:      US RENAL BILATERAL    Date of Exam: 1/6/2025 9:08 AM EST    Indication: JOSE, r/o obstruction.    Comparison: No comparisons available.    Technique: Grayscale and color Doppler ultrasound evaluation of the kidneys and urinary bladder was performed.      Findings:  Right kidney measures 9.8 cm length. Left kidney measures 9.7 cm length. Both kidneys are normal in echogenicity. No hydronephrosis. Grossly unremarkable incompletely distended urinary bladder      Impression:      Impression:  Normal ultrasound appearance of the kidneys with no evidence of obstructive uropathy        Electronically Signed: Roque Penn    1/6/2025 9:33 AM EST    Workstation " ID: [id number]    CT Cervical Spine Without Contrast [[id number]] Collected: 01/06/25 0158     Updated: 01/06/25 0206    Narrative:      CT CERVICAL SPINE WO CONTRAST    Date of Exam: 1/6/2025 1:09 AM EST    Indication: Neck pain after fall.    Comparison: 3/29/2023    Technique: Axial CT images were obtained of the cervical spine without contrast administration.  Sagittal and coronal reconstructions were performed.  Automated exposure control and iterative reconstruction methods were used.    Findings:  Patient is fused from C3-C7 with an anterior plate and screws from C3-C6. Cervical alignment is normal. There is multilevel facet arthropathy. There is disc space narrowing at C7-T1. There is scoliosis at the cervicothoracic junction. No acute cervical   spine fracture is identified. Paraspinal soft tissues are grossly normal.      Impression:      No acute cervical spine fracture. No significant interval change.      Electronically Signed: Brad Foster MD    1/6/2025 2:04 AM EST    Workstation ID: [id number]    CT Facial Bones Without Contrast [[id number]] Collected: 01/06/25 0154     Updated: 01/06/25 0200    Narrative:      CT FACIAL BONES WO CONTRAST    Date of Exam: 1/6/2025 1:09 AM EST    Indication: Fall with bruising.    Comparison: CT orbit 10/12/2018    Technique: Axial CT images were obtained from the inferior aspect of the mandible through the frontal sinuses without contrast administration.  Sagittal and coronal reconstructions were performed.  Automated exposure control and iterative reconstruction   methods were used.    Findings:  Temporomandibular joints demonstrate normal alignment. The mandible is intact. No acute facial bone fracture. No evidence of acute sinus disease. The globes and orbits appear grossly normal.      Impression:      Negative facial bone CT.        Electronically Signed: Brad Foster MD    1/6/2025 1:58 AM EST    Workstation ID: [id number]    CT Abdomen Pelvis Without Contrast  [[id number]] Collected: 01/06/25 0144     Updated: 01/06/25 0158    Narrative:      CT ABDOMEN PELVIS WO CONTRAST    Date of Exam: 1/6/2025 1:09 AM EST    Indication: lower abdominal pain, diarrhea.    Comparison: CT abdomen pelvis August 5, 2024    Technique: Axial CT images were obtained of the abdomen and pelvis without the administration of contrast. Sagittal and coronal reconstructions were performed.  Automated exposure control and iterative reconstruction methods were used.    Findings:  Lung Bases:     There is mild right middle lobe atelectasis. There is bilateral basilar atelectasis. The heart is enlarged. There are postsurgical changes of the pulmonary artery. Heavy coronary artery calcifications are seen.    Liver:  The liver is of normal size. There are no focal liver lesions with noncontrast technique.    Biliary/Gallbladder:    Layering density in the gallbladder may be stones or sludge. The biliary tree is normal.    Spleen:  There is mild splenomegaly.    Pancreas:    Pancreas is normal. There is no evidence of pancreatic mass or peripancreatic fluid.    Kidneys:    There is a nonobstructing stone of the left kidney. There is no hydronephrosis of either kidney.    Adrenals:    Adrenal glands are unremarkable.    Retroperitoneal/Lymph Nodes/Vasculature:    No retroperitoneal adenopathy is identified.    Gastrointestinal/Mesentery:    The bowel loops are non-dilated without wall thickening or mass. The appendix appears within normal limits. No evidence of obstruction. No free air. No mesenteric fluid collections identified.    Bladder:    The bladder is normal.    Genital:     Unremarkable          Bony Structures:     There are old compression fractures of T12 and L1. A benign hemangioma of L4 is unchanged. There is no acute fracture.        Impression:      Impression:  1.No acute abdominal or pelvic abnormality.  2.Mild splenomegaly.  3.Nonobstructing left renal stone.  4.Layering density in the  gallbladder may be stones or sludge.                Electronically Signed: Rocky Heredia MD    1/6/2025 1:56 AM EST    Workstation ID: [id number]    CT Head Without Contrast [[id number]] Collected: 01/06/25 0148     Updated: 01/06/25 0156    Narrative:      CT HEAD WO CONTRAST    HISTORY:  Head injury from fall. Bruising.    TECHNIQUE:  Axial unenhanced head CT with coronal reformats. Radiation dose reduction techniques included automated exposure control or exposure modulation based on body size.    COMPARISON:  11/2/2024, 11/5/2023    FINDINGS:  Ventricular size and configuration are normal. No acute infarct or hemorrhage is identified. There are no masses. There is no skull fracture. Atherosclerotic calcifications are present in the vertebral arteries and carotid siphons. Mild chronic small   vessel ischemic changes are present in the white matter.      Impression:      Senescent changes without acute abnormality.          Electronically Signed: Brad Foster MD    1/6/2025 1:54 AM EST    Workstation ID: [id number]    XR Chest 1 View [[id number]] Collected: 01/06/25 0036     Updated: 01/06/25 0040    Narrative:      XR CHEST 1 VW    Date of Exam: 1/6/2025 12:20 AM EST    Indication: cough, soa    Comparison: 11/2/2024    Findings:  There is a small stent graft in the proximal descending aorta. The heart remains enlarged status post CABG and valve repair. Left-sided multipolar pacer is present. Patient is status post mastectomy with bilateral breast reconstructions. Patient is also   status post cervical spinal fusion. No acute infiltrates are identified. No pneumothorax.      Impression:      1.Cardiomegaly with postoperative changes of CABG and valve repair.  2.No acute infiltrates identified.        Electronically Signed: Brad Foster MD    1/6/2025 12:38 AM EST    Workstation ID: [id number]              ECHOCARDIOGRAM:    Results for orders placed during the hospital encounter of 03/07/23    Adult Transthoracic  Echo Complete W/ Cont if Necessary Per Protocol    Interpretation Summary    Left ventricular systolic function is normal. Left ventricular ejection fraction appears to be 56 - 60%.    Left ventricular diastolic function is consistent with (grade Ia w/high LAP) impaired relaxation.    Severely reduced right ventricular systolic function noted.    The right ventricular cavity is moderately dilated.    The right atrial cavity is moderate to severely  dilated.    There is moderate, bileaflet mitral valve thickening present.    Severe tricuspid valve regurgitation is present.    Estimated right ventricular systolic pressure from tricuspid regurgitation is mildly elevated (35-45 mmHg).    Mild pulmonary hypertension is present.    There is a trivial pericardial effusion. There is no evidence of cardiac tamponade.            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  Under therapy evaluation also pain management from oncology standpoint  She also history of COVID 2021 she was admitted recently in February 2023 with chest pain atypical nature thought to be secondary to metastatic disease  She has no history of obstructive CAD or coronary intervention     Plan:   Will arrange for GREGORY with Dr. Carrasquillo on Thursday afternoon for eval GBS and bacteremia in seetin go PPM/congenital ht ds  Other comorbidities assessed and managed today as well.   Npo after midnight on Thursday    Continue to follow  renal function electrolytes replace potassium  IV fluids  Antibiotics will continue  GREGORY scheduled for tomorrow  N.p.o. midnight  No evidence of ACS or unstable angina    Cardiac comorbidities all manage independently today documented above  Telemetry reviewed interpreted by me demonstrates paced rhythm, reviewed interpreted daily      Stanley Lopez MD  01/07/25  12:34 EST

## 2025-01-08 NOTE — PROGRESS NOTES
"                                                                                                            [facility]/ [facility]                                                                     Nephrology Progress Note                                                                                LOS: 2 days     Chief Complaint/ Reason for encounter: no    Subjective   01/08/25 : patient complaining of feeling weak, has no other new complaint. No short of breath.       Medical history reviewed:  History of Present Illness    Subjective    History taken from: Patient and chart    Vital Signs  Temp:  [97.6 °F (36.4 °C)-98 °F (36.7 °C)] 98 °F (36.7 °C)  Heart Rate:  [60-74] 60  Resp:  [15-21] 17  BP: (112-130)/(56-95) 117/62       Wt Readings from Last 1 Encounters:   01/07/25 1153 46.3 kg (102 lb)   01/05/25 2351 46.3 kg (102 lb)       Objective:  Vital signs: (most recent): Blood pressure 117/62, pulse 60, temperature 98 °F (36.7 °C), temperature source Oral, resp. rate 17, height 152.4 cm (60\"), weight 46.3 kg (102 lb), SpO2 98%, not currently breastfeeding.                Objective:  General Appearance:  Comfortable, -appearing, in no acute distress and not in pain.  Awake, alert  HEENT: Mucous membranes moist, no injury, oropharynx clear  Lungs:  Normal effort and normal respiratory rate.  Breath sounds clear to auscultation.  No  respiratory distress.  No rales, decreased breath sounds or rhonchi.    Heart: Normal rate.  Regular rhythm.  S1, S2 normal.  No murmur.   Abdomen: Abdomen is soft.  Bowel sounds are normal, no abdominal tenderness.  There is no rebound or guarding  Extremities:  no edema of bilateral lower extremities  Skin:  Warm and dry with no rashes      Results Review:    Intake/Output:     Intake/Output Summary (Last 24 hours) at 1/8/2025 1218  Last data filed at 1/8/2025 1013  Gross per 24 hour   Intake 598 ml   Output 400 ml   Net 198 ml         DATA:  Radiology and Labs:  The " following labs independently reviewed by me. Additional labs ordered for tomorrow a.m.  Interval notes, chart personally reviewed by me.   Old records independently reviewed showing   The following radiologic studies independently viewed by me, findings   New problems include  Discussed with     Risk/ complexity of medical care/ medical decision making Moderate.    Labs:   Recent Results (from the past 24 hours)   Blood Gas, Arterial -    Collection Time: 01/07/25  2:53 PM    Specimen: Arterial Blood   Result Value Ref Range    Site Right Radial     Trenton's Test Positive     pH, Arterial 7.370 7.350 - 7.450 pH units    pCO2, Arterial 30.8 (L) 35.0 - 48.0 mm Hg    pO2, Arterial 95.8 83.0 - 108.0 mm Hg    HCO3, Arterial 17.8 (L) 21.0 - 28.0 mmol/L    Base Excess, Arterial -6.5 (L) 0.0 - 3.0 mmol/L    O2 Saturation, Arterial 97.4 94.0 - 98.0 %    CO2 Content 18.7 (L) 22 - 29 mmol/L    Barometric Pressure for Blood Gas      Modality Cannula     FIO2 28 %    Hemodilution No     PO2/FIO2 342 0 - 500   Protein / Creatinine Ratio, Urine - Urine, Clean Catch    Collection Time: 01/07/25  3:13 PM    Specimen: Urine, Clean Catch   Result Value Ref Range    Protein/Creatinine Ratio, Urine 429.1 (H) 0.0 - 200.0 mg/G Crea    Creatinine, Urine 78.3 mg/dL    Total Protein, Urine 33.6 mg/dL   Adult Transthoracic Echo Complete W/ Cont if Necessary Per Protocol    Collection Time: 01/07/25  4:18 PM   Result Value Ref Range    LVIDd 4.3 cm    LVIDs 3.0 cm    IVSd 0.70 cm    LVPWd 0.70 cm    FS 30.2 %    IVS/LVPW 1.00 cm    ESV(cubed) 27.0 ml    LV Sys Vol (BSA corrected) 26.7 cm2    EDV(cubed) 79.5 ml    LV Perez Vol (BSA corrected) 60.3 cm2    LV mass(C)d 88.5 grams    LVOT area 2.27 cm2    LVOT diam 1.70 cm    EDV(MOD-sp4) 84.5 ml    ESV(MOD-sp4) 37.4 ml    SV(MOD-sp4) 47.1 ml    SVi(MOD-SP4) 33.6 ml/m2    SVi (LVOT) 30.9 ml/m2    EF(MOD-sp4) 55.7 %    MV E max eric 66.5 cm/sec    MV A max eric 65.5 cm/sec    MV dec time 0.19 sec    MV  E/A 1.02     Med Peak E' Jt 5.6 cm/sec    Lat Peak E' Jt 10.4 cm/sec    TR max jt 354.0 cm/sec    Avg E/e' ratio 8.31     SV(LVOT) 43.4 ml    TAPSE (>1.6) 1.26 cm    RV S' 8.4 cm/sec    LA dimension (2D)  3.9 cm    LV V1 max 126.0 cm/sec    LV V1 max PG 6.4 mmHg    LV V1 mean PG 3.0 mmHg    LV V1 VTI 19.1 cm    Ao pk jt 184.0 cm/sec    Ao max PG 13.5 mmHg    Ao mean PG 7.0 mmHg    Ao V2 VTI 34.8 cm    CAROLE(I,D) 1.25 cm2    MV max PG 2.49 mmHg    MV mean PG 1.00 mmHg    MV V2 VTI 16.6 cm    MV P1/2t 40.6 msec    MVA(P1/2t) 5.4 cm2    MVA(VTI) 2.6 cm2    MV dec slope 519.0 cm/sec2    TR max PG 50.1 mmHg    RVSP(TR) 58.1 mmHg    RAP systole 8.0 mmHg    RV V1 max PG 3.8 mmHg    RV V1 max 97.4 cm/sec    RV V1 VTI 16.2 cm    PA V2 max 234.0 cm/sec    PA acc time 0.11 sec    Sinus 3.0 cm    STJ 2.6 cm    Dimensionless Index 0.70 (DI)    EF(MOD-bp) 55.7 %   POC Glucose Once    Collection Time: 01/07/25  4:53 PM    Specimen: Blood   Result Value Ref Range    Glucose 123 (H) 70 - 105 mg/dL   POC Glucose Once    Collection Time: 01/07/25  9:00 PM    Specimen: Blood   Result Value Ref Range    Glucose 127 (H) 70 - 105 mg/dL   Phosphorus    Collection Time: 01/08/25  4:01 AM    Specimen: Arm, Right; Blood   Result Value Ref Range    Phosphorus 1.7 (C) 2.5 - 4.5 mg/dL   Comprehensive Metabolic Panel    Collection Time: 01/08/25  4:01 AM    Specimen: Arm, Right; Blood   Result Value Ref Range    Glucose 113 (H) 65 - 99 mg/dL    BUN 39 (H) 8 - 23 mg/dL    Creatinine 0.86 0.57 - 1.00 mg/dL    Sodium 134 (L) 136 - 145 mmol/L    Potassium 3.6 3.5 - 5.2 mmol/L    Chloride 107 98 - 107 mmol/L    CO2 19.7 (L) 22.0 - 29.0 mmol/L    Calcium 8.7 8.6 - 10.5 mg/dL    Total Protein 5.7 (L) 6.0 - 8.5 g/dL    Albumin 3.0 (L) 3.5 - 5.2 g/dL    ALT (SGPT) 14 1 - 33 U/L    AST (SGOT) 33 (H) 1 - 32 U/L    Alkaline Phosphatase 52 39 - 117 U/L    Total Bilirubin 0.5 0.0 - 1.2 mg/dL    Globulin 2.7 gm/dL    A/G Ratio 1.1 g/dL    BUN/Creatinine Ratio  45.3 (H) 7.0 - 25.0    Anion Gap 7.3 5.0 - 15.0 mmol/L    eGFR 76.5 >60.0 mL/min/1.73   CBC Auto Differential    Collection Time: 01/08/25  4:01 AM    Specimen: Arm, Right; Blood   Result Value Ref Range    WBC 3.80 3.40 - 10.80 10*3/mm3    RBC 3.63 (L) 3.77 - 5.28 10*6/mm3    Hemoglobin 10.8 (L) 12.0 - 15.9 g/dL    Hematocrit 33.7 (L) 34.0 - 46.6 %    MCV 92.8 79.0 - 97.0 fL    MCH 29.8 26.6 - 33.0 pg    MCHC 32.0 31.5 - 35.7 g/dL    RDW 14.7 12.3 - 15.4 %    RDW-SD 50.4 37.0 - 54.0 fl    MPV 11.5 6.0 - 12.0 fL    Platelets 43 (C) 140 - 450 10*3/mm3    Neutrophil % 69.9 42.7 - 76.0 %    Lymphocyte % 10.5 (L) 19.6 - 45.3 %    Monocyte % 16.1 (H) 5.0 - 12.0 %    Eosinophil % 0.3 0.3 - 6.2 %    Basophil % 0.3 0.0 - 1.5 %    Immature Grans % 2.9 (H) 0.0 - 0.5 %    Neutrophils, Absolute 2.66 1.70 - 7.00 10*3/mm3    Lymphocytes, Absolute 0.40 (L) 0.70 - 3.10 10*3/mm3    Monocytes, Absolute 0.61 0.10 - 0.90 10*3/mm3    Eosinophils, Absolute 0.01 0.00 - 0.40 10*3/mm3    Basophils, Absolute 0.01 0.00 - 0.20 10*3/mm3    Immature Grans, Absolute 0.11 (H) 0.00 - 0.05 10*3/mm3    nRBC 0.0 0.0 - 0.2 /100 WBC   POC Glucose Once    Collection Time: 01/08/25  7:31 AM    Specimen: Blood   Result Value Ref Range    Glucose 108 (H) 70 - 105 mg/dL   POC Glucose Once    Collection Time: 01/08/25 11:23 AM    Specimen: Blood   Result Value Ref Range    Glucose 148 (H) 70 - 105 mg/dL       Radiology:  Pertinent radiology studies were reviewed as described above      Medications have been reviewed separately in chart overview      ASSESSMENT / PLAN    Acute kidney injury improving already secondary to intravascular volume depletion. UA revealed protein, ketones, wbc and very few RBCs.   Metabolic acidosis related to GI losses.      Plan  Repeat UA in next few days  UPCR 429 ... Will repeat in outpatient setting.  Follow urine culture ... Revealing growth.  US renal no hydro.  Avoid NSAIDs, Avoid Nephrotoxic medication.  LR 75 ml per hour  for today. Will d/c fluids tomorrow.        Hypokalemia replace as needed.    Hypophosphatemia been replaced.     Nausea / vomiting / diarrhea      Bacteremia as per ID/ primary service. Plan for 2 d echo / GREGORY noted.     Ca breast  on Abemeciclib as per oncology service.  Thrombocytopenia       Angelica Rodriguez MD  [facility]  Office phone number: [phone]  Answering service phone number: [phone]    01/08/25  12:18 EST    Dictation performed using Dragon dictation MedSocket

## 2025-01-08 NOTE — DISCHARGE PLACEMENT REQUEST
"Lucien Garrison (64 y.o. Female)       Date of Birth   1960    Social Security Number       Address   [address] [place] [address] [address] [address]    Home Phone   [phone]    MRN   [id number]       [place]   None    Marital Status   Legally                             Admission Date   1/5/25    Admission Type   Emergency    Admitting Provider   Llanes Alvarez, Carlos, MD    Attending Provider   Bautista Chapman MD    Department, Room/Bed   [facility] 2D, 271/1       Discharge Date       Discharge Disposition       Discharge Destination                                 Attending Provider: Bautista Chapman MD    Allergies: Oxycodone, Promethazine, Tape, Adhesive Tape, Flexeril [Cyclobenzaprine]    Isolation: None   Infection: None   Code Status: CPR    Ht: 152.4 cm (60\")   Wt: 46.3 kg (102 lb)    Admission Cmt: None   Principal Problem: JOSE (acute kidney injury) [N17.9]                   Active Insurance as of 1/5/2025       Primary Coverage       Payor Plan Insurance Group Employer/Plan Group    [employer]ETNA MEDICARE [place]PLAC[id number]       Payor Plan Address Payor Plan Phone Number Payor Plan Fax Number Effective Dates    [address] [phone]  1/1/2024 - None Entered    [facility] [id number]         Subscriber Name Subscriber Birth Date Member ID       LUCIEN GARRISON 1960 [id number]                     Emergency Contacts        (Rel.) Home Phone Work Phone Mobile Phone    GARRISONANGELLA PHILIP (Spouse) [phone]            "

## 2025-01-08 NOTE — PLAN OF CARE
Problem: Adult Inpatient Plan of Care  Goal: Absence of Hospital-Acquired Illness or Injury  Intervention: Identify and Manage Fall Risk  Recent Flowsheet Documentation  Taken 1/8/2025 1634 by Enriqueta Alonso RN  Safety Promotion/Fall Prevention:   assistive device/personal items within reach   clutter free environment maintained   fall prevention program maintained   lighting adjusted   nonskid shoes/slippers when out of bed   room organization consistent   safety round/check completed  Taken 1/8/2025 1440 by Enriqueta Alonso RN  Safety Promotion/Fall Prevention:   assistive device/personal items within reach   clutter free environment maintained   fall prevention program maintained   lighting adjusted   nonskid shoes/slippers when out of bed   room organization consistent   safety round/check completed  Taken 1/8/2025 1202 by Enriqueta Alonso RN  Safety Promotion/Fall Prevention:   assistive device/personal items within reach   clutter free environment maintained   fall prevention program maintained   lighting adjusted   nonskid shoes/slippers when out of bed   room organization consistent   safety round/check completed  Taken 1/8/2025 1013 by Enriqueta Alonso RN  Safety Promotion/Fall Prevention:   assistive device/personal items within reach   clutter free environment maintained   fall prevention program maintained   lighting adjusted   nonskid shoes/slippers when out of bed   room organization consistent   safety round/check completed  Taken 1/8/2025 0850 by Enriqueta Alonso RN  Safety Promotion/Fall Prevention:   assistive device/personal items within reach   clutter free environment maintained   fall prevention program maintained   lighting adjusted   nonskid shoes/slippers when out of bed   room organization consistent   safety round/check completed  Intervention: Prevent Skin Injury  Recent Flowsheet Documentation  Taken 1/8/2025 1634 by Enriqueta Alonso, RN  Body Position: position changed independently  Taken 1/8/2025  1440 by Enriqueta Alonso RN  Body Position: position changed independently  Taken 1/8/2025 1202 by Enriqueta Alonso RN  Body Position: position changed independently  Taken 1/8/2025 1013 by Enriqueta Alonso RN  Body Position: position changed independently  Taken 1/8/2025 0850 by Enriqueta Alonso RN  Body Position: position changed independently  Intervention: Prevent Infection  Recent Flowsheet Documentation  Taken 1/8/2025 1634 by Enriqueta Alonso RN  Infection Prevention:   hand hygiene promoted   personal protective equipment utilized  Taken 1/8/2025 1440 by Enriqueta Alonso RN  Infection Prevention:   hand hygiene promoted   personal protective equipment utilized  Taken 1/8/2025 1202 by Enriqueta Alonso RN  Infection Prevention:   hand hygiene promoted   personal protective equipment utilized  Taken 1/8/2025 1013 by Enriqueta Alonso RN  Infection Prevention:   hand hygiene promoted   personal protective equipment utilized  Taken 1/8/2025 0850 by Enriqueta Alonso RN  Infection Prevention:   hand hygiene promoted   personal protective equipment utilized  Goal: Optimal Comfort and Wellbeing  Intervention: Provide Person-Centered Care  Recent Flowsheet Documentation  Taken 1/8/2025 1634 by Enriqueta Alonso RN  Trust Relationship/Rapport: care explained  Taken 1/8/2025 0850 by Enriqueta Alonso RN  Trust Relationship/Rapport: care explained     Problem: Comorbidity Management  Goal: Blood Glucose Level Within Target Range  Intervention: Monitor and Manage Glycemia  Recent Flowsheet Documentation  Taken 1/8/2025 1634 by Enriqueta Alonso RN  Medication Review/Management: medications reviewed  Taken 1/8/2025 1440 by Enriqueta Alonso RN  Medication Review/Management: medications reviewed  Taken 1/8/2025 1202 by Enriqueta Alonso RN  Medication Review/Management: medications reviewed  Taken 1/8/2025 1013 by Enriqueta Alonso RN  Medication Review/Management: medications reviewed  Taken 1/8/2025 0850 by Enriqueta Alonso RN  Medication Review/Management: medications  reviewed     Problem: Skin Injury Risk Increased  Goal: Skin Health and Integrity  Intervention: Optimize Skin Protection  Recent Flowsheet Documentation  Taken 1/8/2025 1634 by Enriqueta Alonso RN  Activity Management: activity encouraged  Pressure Reduction Techniques: frequent weight shift encouraged  Head of Bed (HOB) Positioning: HOB elevated  Pressure Reduction Devices: pressure-redistributing mattress utilized  Taken 1/8/2025 1440 by Enriqueta Alonso RN  Activity Management: activity encouraged  Head of Bed (HOB) Positioning: HOB elevated  Taken 1/8/2025 1202 by Enriqueta Alonso RN  Activity Management: activity encouraged  Head of Bed (HOB) Positioning: HOB elevated  Taken 1/8/2025 1013 by Enriqueta Alonso RN  Activity Management: activity encouraged  Head of Bed (HOB) Positioning: HOB elevated  Taken 1/8/2025 0850 by Enriqueta Alonso RN  Activity Management: activity encouraged  Pressure Reduction Techniques: frequent weight shift encouraged  Head of Bed (HOB) Positioning: HOB elevated  Pressure Reduction Devices: pressure-redistributing mattress utilized     Problem: Fall Injury Risk  Goal: Absence of Fall and Fall-Related Injury  Intervention: Identify and Manage Contributors  Recent Flowsheet Documentation  Taken 1/8/2025 1634 by Enriqueta Alonso RN  Medication Review/Management: medications reviewed  Taken 1/8/2025 1440 by Enriqueta Alonso RN  Medication Review/Management: medications reviewed  Taken 1/8/2025 1202 by Enriqueta Alonso RN  Medication Review/Management: medications reviewed  Taken 1/8/2025 1013 by Enriqueta Alonso RN  Medication Review/Management: medications reviewed  Taken 1/8/2025 0850 by Enriqueta Alonso RN  Medication Review/Management: medications reviewed  Intervention: Promote Injury-Free Environment  Recent Flowsheet Documentation  Taken 1/8/2025 1634 by Enriqueta Alonso RN  Safety Promotion/Fall Prevention:   assistive device/personal items within reach   clutter free environment maintained   fall prevention  program maintained   lighting adjusted   nonskid shoes/slippers when out of bed   room organization consistent   safety round/check completed  Taken 1/8/2025 1440 by Enriqueta Alonso RN  Safety Promotion/Fall Prevention:   assistive device/personal items within reach   clutter free environment maintained   fall prevention program maintained   lighting adjusted   nonskid shoes/slippers when out of bed   room organization consistent   safety round/check completed  Taken 1/8/2025 1202 by Enriqueta Alonso RN  Safety Promotion/Fall Prevention:   assistive device/personal items within reach   clutter free environment maintained   fall prevention program maintained   lighting adjusted   nonskid shoes/slippers when out of bed   room organization consistent   safety round/check completed  Taken 1/8/2025 1013 by Enriqueta Alonso RN  Safety Promotion/Fall Prevention:   assistive device/personal items within reach   clutter free environment maintained   fall prevention program maintained   lighting adjusted   nonskid shoes/slippers when out of bed   room organization consistent   safety round/check completed  Taken 1/8/2025 0850 by Enriqueta Alonso RN  Safety Promotion/Fall Prevention:   assistive device/personal items within reach   clutter free environment maintained   fall prevention program maintained   lighting adjusted   nonskid shoes/slippers when out of bed   room organization consistent   safety round/check completed     Problem: Acute Kidney Injury/Impairment  Goal: Effective Renal Function  Intervention: Monitor and Support Renal Function  Recent Flowsheet Documentation  Taken 1/8/2025 1634 by Enriqueta Alonso RN  Medication Review/Management: medications reviewed  Taken 1/8/2025 1440 by Enriqueta Alonso RN  Medication Review/Management: medications reviewed  Taken 1/8/2025 1202 by Enriqueta Alonso RN  Medication Review/Management: medications reviewed  Taken 1/8/2025 1013 by Enriqueta Alonso RN  Medication Review/Management: medications  reviewed  Taken 1/8/2025 0850 by Enriqueta Alonso, ALIREZA  Medication Review/Management: medications reviewed   Goal Outcome Evaluation:      Pt to have GREGORY in the AM. NPO at MN. C/o pain throughout the shift. PRN meds given per MAR. Pt in bed, bed alarm on, call light in reach. Plan of care to continue.

## 2025-01-08 NOTE — PROGRESS NOTES
[facility] MEDICINE SERVICE  DAILY PROGRESS NOTE    NAME: Gladys Garrison  : 1960  MRN: [id number]      LOS: 2 days     PROVIDER OF SERVICE: Bautista Chapman MD    Chief Complaint: JOSE (acute kidney injury)    Subjective:     Interval History:  History taken from: patient    Patient still with abdominal pain but was able to tolerate p.o. intake today.    Review of Systems:   Review of Systems    Objective:     Vital Signs  Temp:  [97.6 °F (36.4 °C)-98 °F (36.7 °C)] 98 °F (36.7 °C)  Heart Rate:  [60-74] 60  Resp:  [15-21] 17  BP: (112-130)/(56-95) 117/62   Body mass index is 19.92 kg/m².    Physical Exam  General Appearance:  Alert, cooperative, no distress, appears stated age  Head:  Normocephalic, without obvious abnormality, atraumatic  Eyes:  PERRL, conjunctiva/corneas clear, EOM's intact, fundi benign, both eyes  Ears:  Normal TM's and external ear canals, both ears  Nose: Nares normal, septum midline, mucosa normal, no drainage or sinus tenderness  Throat: Lips, mucosa, and tongue normal; teeth and gums normal  Neck: Supple, symmetrical, trachea midline, no adenopathy, thyroid: not enlarged, symmetric, no tenderness/mass/nodules, no carotid bruit or JVD  Lungs:   Clear to auscultation bilaterally, respirations unlabored  Heart:  Regular rate and rhythm, S1, S2 normal, no murmur, rub or gallop  Abdomen:  Soft, mild diffuse TTP, bowel sounds active all four quadrants,  no masses, no organomegaly  Extremities: Extremities normal, atraumatic, no cyanosis or edema  Pulses: 2+ and symmetric  Skin: Skin color, texture, turgor normal, no rashes or lesions  Neurologic: Normal        Scheduled Meds   cefTRIAXone, 2,000 mg, Intravenous, Q24H  heparin (porcine), 5,000 Units, Subcutaneous, Q8H  insulin lispro, 2-7 Units, Subcutaneous, 4x Daily AC & at Bedtime  pantoprazole, 40 mg, Oral, Q AM  sodium chloride, 10 mL, Intravenous, Q12H       PRN Meds     Calcium Replacement - Follow Nurse / BPA Driven Protocol     dextrose    dextrose    glucagon (human recombinant)    HYDROcodone-acetaminophen    Magnesium Standard Dose Replacement - Follow Nurse / BPA Driven Protocol    nitroglycerin    ondansetron    Phosphorus Replacement - Follow Nurse / BPA Driven Protocol    Potassium Replacement - Follow Nurse / BPA Driven Protocol    sodium chloride    sodium chloride    sodium chloride   Infusions           Diagnostic Data    Results from last 7 days   Lab Units 01/08/25  0401   WBC 10*3/mm3 3.80   HEMOGLOBIN g/dL 10.8*   HEMATOCRIT % 33.7*   PLATELETS 10*3/mm3 43*   GLUCOSE mg/dL 113*   CREATININE mg/dL 0.86   BUN mg/dL 39*   SODIUM mmol/L 134*   POTASSIUM mmol/L 3.6   AST (SGOT) U/L 33*   ALT (SGPT) U/L 14   ALK PHOS U/L 52   BILIRUBIN mg/dL 0.5   ANION GAP mmol/L 7.3       No radiology results for the last day      I reviewed the patient's new clinical results.    Assessment/Plan:   Gladys Garrison is a 64 y.o. female with a PMH of breast cancer with metastasis to bone, complex cardiac medical history including tetralogy of Fallot, coarctation of aorta, VSD status post repair, coarctation of aorta S/P stent who presented to [facility] on 1/[facility] with diarrhea since around last Tuesday it is dark color, associated with nausea and multiple episodes of vomiting. States she has not been able to keep anything down, having generalized abdominal cramps, subjective fever and chills, generalized weakness. States she was treated for a UTI last month. States she has not urinated at all in past 3-4 days. Denies hematuria or dysuria, no flank pain. She sustained a fall 2 days ago and hit her head and face, denies LOC. Workup in ER chemistry labs show creat 2.05, K 3.4, Na 135, albumin 3.2, T bili 1.4. CBC labs Hb 12.6, wbc 11.4, platelets 52. ED course notable for hypotension that slightly improved with IVF, temp 100.1. Saturating well on room air. The decision to admit was made.     Hypotension due to volume depletion  Diarrheal  illness  Oliguric JOSE due to hypovolemia on the basis of diarrhea and vomiting, NSAIDS use  Melena by history  -Patient was initiated on IV fluids with improvement in renal function back to baseline  -Patient only had 1 loose BM last night and has not had any actual persistent diarrhea since she has been admitted  -Continue IV antibiotics with ceftriaxone for bacteremia but discontinued Flagyl   -Holding all nephrotoxic medications including losartan given JOSE  -Advanced to GI soft diet    Group B strep bacteremia  -Unclear source although this could be related to UTI  -Urine culture with 50,000 CFU GBS  -Repeat blood cultures pending  -ID consult appreciated  -Continue ceftriaxone  -Planning for GREGORY on 1/9 to assess for any valvular or lead vegetation    Possible UTI  -UA on admission suggestive of possible infection  -Continue ceftriaxone and follow-up final cultures        History breast cancer with bone metastasis  Thrombocytopenia  -Was on Arimidex in the past was discontinued due to osteoporosis  -Had intolerance to tamoxifen  -She is currently on Abemeciclib outpatient, this has been considered to have alternate therapies given frequent UTIs as per pharmacy note on 12/10  -Will consult oncology given her thrombocytopenia which is likely related to her chemotherapy  -Monitor daily CBCs, monitor platelets  -Continue Norco  mg q 4 PRN for moderate pain  -Continue with intrathecal pump          VTE Prophylaxis:  Pharmacologic VTE prophylaxis orders are present.         Code status is   Code Status and Medical Interventions: CPR (Attempt to Resuscitate); Full Support   Ordered at: 01/07/25 1310     Code Status (Patient has no pulse and is not breathing):    CPR (Attempt to Resuscitate)     Medical Interventions (Patient has pulse or is breathing):    Full Support       Plan for disposition: Hopefully DC home by 1/13/2025    Time: 30 minutes    Part of this note may be an electronic transcription/translation  of spoken language to printed text using the Dragon Dictation System.    Signature: Electronically signed by Bautista Chapman MD, 01/08/25, 13:30 EST.  [facility]ist Team    Helen Thompson

## 2025-01-08 NOTE — CASE MANAGEMENT/SOCIAL WORK
Continued Stay Note  MICHAEL Causey     Patient Name: Gladys Garrison  MRN: [id number]  Today's Date: 1/8/2025    Admit Date: 1/5/2025    Plan: DC PLAN: Routine home. Watch for home IV abx.     Discharge Plan       Row Name 01/08/25 1517       Plan    Plan Comments DCP report sent to [facility] [place], message sent to liasion to follow for possible need for IV abx. Pending GREGORY 1/9. Possible 2-6 weeks IV abx. Case Management to follow.        Row Name 01/08/25 1514       Plan    Plan DC PLAN: Routine home. Watch for home IV abx.    Patient/Family in Agreement with Plan yes                      Expected Discharge Date and Time       Expected Discharge Date Expected Discharge Time    Jan 9, 2025             Maribell Dunbar RN    Case Management  [phone]

## 2025-01-08 NOTE — CONSULTS
Midline Line Insertion Procedure Note    Procedure: Insertion of #18G/10CM PowerMidline    Indications:  Home IV therapy    Procedure Details:   Sterile technique was used including antiseptics, cap, gloves, gown, hand hygiene, mask, and sheet.    #18G/10CM PowerMidline inserted to the R Basilic vein per hospital protocol by Elio Moise RN.     Non-pulsatile blood return: yes    Ultrasound Guidance: Yes    Lot #: [id number]   Expiration date: 2025-11-30    Complications:  none    Findings:  Catheter inserted to 10 cm, with 0 cm exposed.   Mid upper arm circumference is 26 cm.  Catheter was flushed with 20 cc NS and sterile dressing applied.   Patient tolerated procedure well.    Recommendations:  Verbal and/or written Care/Maintenance instructions provided to patient.   Primary nurse notified that midline is okay to use at this time.     Tanner Moise RN  01/08/25  16:54 EST

## 2025-01-08 NOTE — PLAN OF CARE
Goal Outcome Evaluation:  Plan of Care Reviewed With: patient        Progress: no change                   Pt c/o abdominal pain 9/10 and nausea but has been resting comfortably with no other complaints. Pt has been very lethargic throughout the day and night. On room air. IV antibiotics for bacteremia. Pt to have GREGORY on Thursday. Will continue to monitor...

## 2025-01-09 ENCOUNTER — ANESTHESIA (OUTPATIENT)
Dept: CARDIOLOGY | Facility: HOSPITAL | Age: 65
End: 2025-01-09
Payer: MEDICARE

## 2025-01-09 ENCOUNTER — ANESTHESIA EVENT (OUTPATIENT)
Dept: CARDIOLOGY | Facility: HOSPITAL | Age: 65
End: 2025-01-09
Payer: MEDICARE

## 2025-01-09 ENCOUNTER — APPOINTMENT (OUTPATIENT)
Dept: CARDIOLOGY | Facility: HOSPITAL | Age: 65
DRG: 314 | End: 2025-01-09
Payer: MEDICARE

## 2025-01-09 LAB
ANION GAP SERPL CALCULATED.3IONS-SCNC: 8.6 MMOL/L (ref 5–15)
AORTIC DIMENSIONLESS INDEX: 0.7 (DI)
AV MEAN PRESS GRAD SYS DOP V1V2: 7 MMHG
AV VMAX SYS DOP: 184 CM/SEC
BH CV ECHO MEAS - AO MAX PG: 13.5 MMHG
BH CV ECHO MEAS - AO V2 VTI: 34.8 CM
BH CV ECHO MEAS - AVA(I,D): 1.25 CM2
BH CV ECHO MEAS - EDV(CUBED): 79.5 ML
BH CV ECHO MEAS - EDV(MOD-SP4): 84.5 ML
BH CV ECHO MEAS - EF(MOD-SP4): 55.7 %
BH CV ECHO MEAS - ESV(CUBED): 27 ML
BH CV ECHO MEAS - ESV(MOD-SP4): 37.4 ML
BH CV ECHO MEAS - FS: 30.2 %
BH CV ECHO MEAS - IVS/LVPW: 1 CM
BH CV ECHO MEAS - IVSD: 0.7 CM
BH CV ECHO MEAS - LA DIMENSION: 3.9 CM
BH CV ECHO MEAS - LAT PEAK E' VEL: 10.4 CM/SEC
BH CV ECHO MEAS - LV DIASTOLIC VOL/BSA (35-75): 60.3 CM2
BH CV ECHO MEAS - LV MASS(C)D: 88.5 GRAMS
BH CV ECHO MEAS - LV MAX PG: 6.4 MMHG
BH CV ECHO MEAS - LV MEAN PG: 3 MMHG
BH CV ECHO MEAS - LV SYSTOLIC VOL/BSA (12-30): 26.7 CM2
BH CV ECHO MEAS - LV V1 MAX: 126 CM/SEC
BH CV ECHO MEAS - LV V1 VTI: 19.1 CM
BH CV ECHO MEAS - LVIDD: 4.3 CM
BH CV ECHO MEAS - LVIDS: 3 CM
BH CV ECHO MEAS - LVOT AREA: 2.27 CM2
BH CV ECHO MEAS - LVOT DIAM: 1.7 CM
BH CV ECHO MEAS - LVPWD: 0.7 CM
BH CV ECHO MEAS - MED PEAK E' VEL: 5.6 CM/SEC
BH CV ECHO MEAS - MV A MAX VEL: 65.5 CM/SEC
BH CV ECHO MEAS - MV DEC SLOPE: 519 CM/SEC2
BH CV ECHO MEAS - MV DEC TIME: 0.19 SEC
BH CV ECHO MEAS - MV E MAX VEL: 66.5 CM/SEC
BH CV ECHO MEAS - MV E/A: 1.02
BH CV ECHO MEAS - MV MAX PG: 2.49 MMHG
BH CV ECHO MEAS - MV MEAN PG: 1 MMHG
BH CV ECHO MEAS - MV P1/2T: 40.6 MSEC
BH CV ECHO MEAS - MV V2 VTI: 16.6 CM
BH CV ECHO MEAS - MVA(P1/2T): 5.4 CM2
BH CV ECHO MEAS - MVA(VTI): 2.6 CM2
BH CV ECHO MEAS - PA ACC TIME: 0.11 SEC
BH CV ECHO MEAS - PA V2 MAX: 234 CM/SEC
BH CV ECHO MEAS - RAP SYSTOLE: 8 MMHG
BH CV ECHO MEAS - RV MAX PG: 3.8 MMHG
BH CV ECHO MEAS - RV V1 MAX: 97.4 CM/SEC
BH CV ECHO MEAS - RV V1 VTI: 16.2 CM
BH CV ECHO MEAS - RVSP: 58.1 MMHG
BH CV ECHO MEAS - SV(LVOT): 43.4 ML
BH CV ECHO MEAS - SV(MOD-SP4): 47.1 ML
BH CV ECHO MEAS - SVI(LVOT): 30.9 ML/M2
BH CV ECHO MEAS - SVI(MOD-SP4): 33.6 ML/M2
BH CV ECHO MEAS - TAPSE (>1.6): 1.26 CM
BH CV ECHO MEAS - TR MAX PG: 50.1 MMHG
BH CV ECHO MEAS - TR MAX VEL: 354 CM/SEC
BH CV ECHO MEASUREMENTS AVERAGE E/E' RATIO: 8.31
BH CV XLRA - TDI S': 8.4 CM/SEC
BUN SERPL-MCNC: 23 MG/DL (ref 8–23)
BUN/CREAT SERPL: 31.1 (ref 7–25)
CALCIUM SPEC-SCNC: 8.5 MG/DL (ref 8.6–10.5)
CHLORIDE SERPL-SCNC: 107 MMOL/L (ref 98–107)
CO2 SERPL-SCNC: 21.4 MMOL/L (ref 22–29)
CREAT SERPL-MCNC: 0.74 MG/DL (ref 0.57–1)
EGFRCR SERPLBLD CKD-EPI 2021: 91.6 ML/MIN/1.73
GLUCOSE BLDC GLUCOMTR-MCNC: 116 MG/DL (ref 70–105)
GLUCOSE BLDC GLUCOMTR-MCNC: 95 MG/DL (ref 70–105)
GLUCOSE BLDC GLUCOMTR-MCNC: 99 MG/DL (ref 70–105)
GLUCOSE SERPL-MCNC: 107 MG/DL (ref 65–99)
LV EF BIPLANE MOD: 55.7 %
POTASSIUM SERPL-SCNC: 4 MMOL/L (ref 3.5–5.2)
SINUS: 3 CM
SODIUM SERPL-SCNC: 137 MMOL/L (ref 136–145)
STJ: 2.6 CM

## 2025-01-09 PROCEDURE — 25010000002 LIDOCAINE PF 2% 2 % SOLUTION

## 2025-01-09 PROCEDURE — 25010000002 CEFTRIAXONE PER 250 MG: Performed by: STUDENT IN AN ORGANIZED HEALTH CARE EDUCATION/TRAINING PROGRAM

## 2025-01-09 PROCEDURE — 25010000002 PHENYLEPHRINE 10 MG/ML SOLUTION

## 2025-01-09 PROCEDURE — 99232 SBSQ HOSP IP/OBS MODERATE 35: CPT | Performed by: INTERNAL MEDICINE

## 2025-01-09 PROCEDURE — 25810000003 LACTATED RINGERS PER 1000 ML: Performed by: HOSPITALIST

## 2025-01-09 PROCEDURE — 93325 DOPPLER ECHO COLOR FLOW MAPG: CPT

## 2025-01-09 PROCEDURE — 82948 REAGENT STRIP/BLOOD GLUCOSE: CPT | Performed by: STUDENT IN AN ORGANIZED HEALTH CARE EDUCATION/TRAINING PROGRAM

## 2025-01-09 PROCEDURE — 93320 DOPPLER ECHO COMPLETE: CPT

## 2025-01-09 PROCEDURE — 25010000002 PROPOFOL 10 MG/ML EMULSION

## 2025-01-09 PROCEDURE — 25810000003 SODIUM CHLORIDE 0.9 % SOLUTION

## 2025-01-09 PROCEDURE — 97162 PT EVAL MOD COMPLEX 30 MIN: CPT

## 2025-01-09 PROCEDURE — 80048 BASIC METABOLIC PNL TOTAL CA: CPT | Performed by: HOSPITALIST

## 2025-01-09 PROCEDURE — B24BZZ4 ULTRASONOGRAPHY OF HEART WITH AORTA, TRANSESOPHAGEAL: ICD-10-PCS | Performed by: INTERNAL MEDICINE

## 2025-01-09 PROCEDURE — 25010000002 HEPARIN (PORCINE) PER 1000 UNITS: Performed by: STUDENT IN AN ORGANIZED HEALTH CARE EDUCATION/TRAINING PROGRAM

## 2025-01-09 PROCEDURE — 97116 GAIT TRAINING THERAPY: CPT

## 2025-01-09 PROCEDURE — 82948 REAGENT STRIP/BLOOD GLUCOSE: CPT

## 2025-01-09 RX ORDER — PHENYLEPHRINE HYDROCHLORIDE 10 MG/ML
INJECTION INTRAVENOUS AS NEEDED
Status: DISCONTINUED | OUTPATIENT
Start: 2025-01-09 | End: 2025-01-09 | Stop reason: SURG

## 2025-01-09 RX ORDER — LIDOCAINE HYDROCHLORIDE 20 MG/ML
INJECTION, SOLUTION EPIDURAL; INFILTRATION; INTRACAUDAL; PERINEURAL AS NEEDED
Status: DISCONTINUED | OUTPATIENT
Start: 2025-01-09 | End: 2025-01-09 | Stop reason: SURG

## 2025-01-09 RX ORDER — SODIUM CHLORIDE 9 MG/ML
INJECTION, SOLUTION INTRAVENOUS CONTINUOUS PRN
Status: DISCONTINUED | OUTPATIENT
Start: 2025-01-09 | End: 2025-01-09 | Stop reason: SURG

## 2025-01-09 RX ORDER — PROPOFOL 10 MG/ML
VIAL (ML) INTRAVENOUS AS NEEDED
Status: DISCONTINUED | OUTPATIENT
Start: 2025-01-09 | End: 2025-01-09 | Stop reason: SURG

## 2025-01-09 RX ORDER — SODIUM CHLORIDE, SODIUM LACTATE, POTASSIUM CHLORIDE, CALCIUM CHLORIDE 600; 310; 30; 20 MG/100ML; MG/100ML; MG/100ML; MG/100ML
75 INJECTION, SOLUTION INTRAVENOUS CONTINUOUS
Status: DISPENSED | OUTPATIENT
Start: 2025-01-09 | End: 2025-01-09

## 2025-01-09 RX ADMIN — PHENYLEPHRINE HYDROCHLORIDE 100 MCG: 10 INJECTION INTRAVENOUS at 10:33

## 2025-01-09 RX ADMIN — CEFTRIAXONE 2000 MG: 2 INJECTION, POWDER, FOR SOLUTION INTRAMUSCULAR; INTRAVENOUS at 08:54

## 2025-01-09 RX ADMIN — Medication 10 ML: at 08:53

## 2025-01-09 RX ADMIN — Medication 10 ML: at 20:05

## 2025-01-09 RX ADMIN — HEPARIN SODIUM 5000 UNITS: 5000 INJECTION INTRAVENOUS; SUBCUTANEOUS at 21:24

## 2025-01-09 RX ADMIN — PHENYLEPHRINE HYDROCHLORIDE 100 MCG: 10 INJECTION INTRAVENOUS at 10:41

## 2025-01-09 RX ADMIN — HEPARIN SODIUM 5000 UNITS: 5000 INJECTION INTRAVENOUS; SUBCUTANEOUS at 14:55

## 2025-01-09 RX ADMIN — Medication 10 ML: at 20:06

## 2025-01-09 RX ADMIN — PROPOFOL 125 MCG/KG/MIN: 10 INJECTION, EMULSION INTRAVENOUS at 10:12

## 2025-01-09 RX ADMIN — LIDOCAINE HYDROCHLORIDE 50 MG: 20 INJECTION, SOLUTION EPIDURAL; INFILTRATION; INTRACAUDAL; PERINEURAL at 10:11

## 2025-01-09 RX ADMIN — SODIUM CHLORIDE: 9 INJECTION, SOLUTION INTRAVENOUS at 09:53

## 2025-01-09 RX ADMIN — PHENYLEPHRINE HYDROCHLORIDE 100 MCG: 10 INJECTION INTRAVENOUS at 10:25

## 2025-01-09 RX ADMIN — SODIUM CHLORIDE, POTASSIUM CHLORIDE, SODIUM LACTATE AND CALCIUM CHLORIDE 75 ML/HR: 600; 310; 30; 20 INJECTION, SOLUTION INTRAVENOUS at 12:01

## 2025-01-09 RX ADMIN — PROPOFOL 50 MG: 10 INJECTION, EMULSION INTRAVENOUS at 10:11

## 2025-01-09 RX ADMIN — Medication 10 ML: at 08:54

## 2025-01-09 NOTE — CONSULTS
Nutrition Services  Patient Name: Gladys Garrison  YOB: 1960  MRN: [id number]  Admission date: 1/5/2025    *See MSA at bottom of this note.     Severe chronic disease related malnutrition R/t catabolism in the setting of intakes not meeting energy expenditure, combined with potential decreased assimilation of consumed nutrients; as evidenced by weight loss greater than 7.5% in three months, with severe muscle and fat wasting, and intakes meeting less than 75% estimated needs for greater than one month.     At this point will hold off on addition of ONS to avoid creating aversion - pt still with significant GI distress/diarrhea.     Will continue current diet as tolerated - will maintain consistent carbohydrate modification given recently elevated A1C, but if intake declines, may consider liberalizing this further.     NUTRITION SCREENING      Trending Narrative: 1/9: Pt assessed due to concern for decreased appetite and reported weight change. Pt with ongoing diarrhea and GI distress, which is contributing to overall diminished appetite. This has contributed to ongoing limited intake with associated 14.9% weight loss in three months, and assessment C/W clinical malnutrition. Some of this may also be connected to hypermetabolism/catabolism with current breast CA and associated Tx. When feeling better/tolerating PO better, could be a good candidate for ONS.       PO Diet: Diet: Diabetic; Consistent Carbohydrate; Fluid Consistency: Thin (IDDSI 0)   PO Supplements: None ordered    Trending PO Intake:  Variable; averaging about 50% at recent meals        Nutritionally-Pertinent Medications RDN Reviewed, C/W clinical course         Labs (reviewed below): Reviewed      Results from last 7 days   Lab Units 01/09/25  1212 01/08/25  1514 01/08/25  0401 01/07/25  0453 01/06/25  0553   SODIUM mmol/L 137  --  134* 135* 136   POTASSIUM mmol/L 4.0 4.0 3.6 3.1* 3.2*   CHLORIDE mmol/L 107  --  107 108* 106   CO2  "mmol/L 21.4*  --  19.7* 16.9* 16.6*   BUN mg/dL 23  --  39* 50* 52*   CREATININE mg/dL 0.74  --  0.86 1.09* 1.79*   CALCIUM mg/dL 8.5*  --  8.7 7.6* 7.7*   BILIRUBIN mg/dL  --   --  0.5 0.7 1.4*   ALK PHOS U/L  --   --  52 39 43   ALT (SGPT) U/L  --   --  14 17 18   AST (SGOT) U/L  --   --  33* 36* 36*   GLUCOSE mg/dL 107*  --  113* 97 116*     Results from last 7 days   Lab Units 01/08/25  1514 01/08/25  0401 01/07/25  0453 01/06/25  0553   MAGNESIUM mg/dL  --   --  2.1 2.0   PHOSPHORUS mg/dL 2.4* 1.7* 2.0* 2.4*   HEMOGLOBIN g/dL  --  10.8* 10.7* 11.5*   HEMATOCRIT %  --  33.7* 34.1 36.4     Lab Results   Component Value Date    HGBA1C 6.20 (H) 09/25/2023          GI Function:  BM 1/7          Skin: No PI documented        Weight Review: Estimated body mass index is 19.92 kg/m² as calculated from the following:    Height as of this encounter: 152.4 cm (60\").    Weight as of this encounter: 46.3 kg (102 lb).    Comment:   1/7: Scale weight 46.3 kg - down 14.9% in three months - significant     Wt Readings from Last 30 Encounters:   01/07/25 1153 46.3 kg (102 lb)   01/05/25 2351 46.3 kg (102 lb)   01/03/25 0936 54 kg (119 lb)   12/04/24 1516 54.4 kg (120 lb)   12/03/24 1444 53.5 kg (118 lb)   11/02/24 1407 53.5 kg (118 lb)   10/09/24 1507 54.4 kg (120 lb)   10/03/24 1606 54 kg (119 lb)   09/27/24 1210 55 kg (121 lb 3.2 oz)   09/19/24 1501 53.4 kg (117 lb 12.8 oz)   09/11/24 1559 55.3 kg (122 lb)   08/30/24 1357 54.4 kg (120 lb)   08/24/24 1146 54.7 kg (120 lb 9.5 oz)   08/23/24 0948 53.5 kg (118 lb)   07/25/24 1055 53 kg (116 lb 12.8 oz)   07/22/24 1444 53.4 kg (117 lb 11.6 oz)   07/01/24 1445 53.4 kg (117 lb 12.8 oz)   06/24/24 1256 53.5 kg (118 lb)   05/15/24 1117 55.3 kg (122 lb)   05/10/24 1402 55.3 kg (122 lb)   05/09/24 0832 58.5 kg (129 lb)   05/08/24 1041 55.3 kg (122 lb)   04/15/24 1327 55.8 kg (123 lb)   03/28/24 1540 54.9 kg (121 lb)   03/27/24 1057 53.8 kg (118 lb 8 oz)   03/25/24 1648 57.6 kg (126 lb " 15.8 oz)   03/25/24 1554 57.6 kg (126 lb 15.8 oz)   03/19/24 1329 57.6 kg (127 lb)   02/20/24 0807 59.1 kg (130 lb 6.4 oz)   01/22/24 1255 61.2 kg (135 lb)   01/22/24 1148 60.8 kg (134 lb)              Trending Physical   Appearance, NFPE 1/9: NFPE completed, consistent with nutrition diagnosis of malnutrition using AND/ASPEN criteria. See MSA below.             Nutrition Problem Statement: Severe chronic disease related malnutrition R/t catabolism in the setting of intakes not meeting energy expenditure, combined with potential decreased assimilation of consumed nutrients; as evidenced by weight loss greater than 7.5% in three months, with severe muscle and fat wasting, and intakes meeting less than 75% estimated needs for greater than one month.         Nutrition Intervention: At this point will hold off on addition of ONS to avoid creating aversion - pt still with significant GI distress/diarrhea.     Will continue current diet as tolerated - will maintain consistent carbohydrate modification given recently elevated A1C, but if intake declines, may consider liberalizing this further.           Monitoring/Evaluation PO intake, Pertinent labs, Weight, Skin status, GI status, POC/GOC        Malnutrition Severity Assessment      Patient meets criteria for : Severe Malnutrition  Malnutrition Type (Last 8 Hours)       Malnutrition Severity Assessment       Row Name 01/10/25 1748       Malnutrition Severity Assessment    Malnutrition Type Chronic Disease - Related Malnutrition      Row Name 01/10/25 1748       Insufficient Energy Intake     Insufficient Energy Intake Findings Severe    Insufficient Energy Intake  <75% of est. energy requirement for > or equal to 1 month      Row Name 01/10/25 1748       Unintentional Weight Loss     Unintentional Weight Loss Findings Severe    Unintentional Weight Loss  Weight loss greater than 7.5% in three months      Row Name 01/10/25 1748       Muscle Loss    Loss of Muscle Mass  Findings Severe    [place] Region Severe - deep hollowing/scooping, lack of muscle to touch, facial bones well defined    Clavicle Bone Region Severe - protruding prominent bone    Acromion Bone Region Severe - squared shoulders, bones, and acromion process protrusion prominent    Patellar Region Severe - prominent bone, square looking, very little muscle definition    Anterior Thigh Region Severe - line/depression along thigh, obviously thin    Posterior Calf Region Severe - thin with very little definition/firmness      Row Name 01/10/25 1748       Fat Loss    Subcutaneous Fat Loss Findings Severe    Orbital Region  Severe - pronounced hollowness/depression, dark circles, loose saggy skin      Row Name 01/10/25 1748       Criteria Met (Must meet criteria for severity in at least 2 of these categories: M Wasting, Fat Loss, Fluid, Secondary Signs, Wt. Status, Intake)    Patient meets criteria for  Severe Malnutrition                         RD to follow up per protocol.    Electronically signed by:  Taylor Camilo RD  01/09/25 15:43 EST

## 2025-01-09 NOTE — PROGRESS NOTES
Infectious Diseases Progress Note      LOS: 3 days   Patient Care Team:  Chirag Robles DO as PCP - General (Family Medicine)  Мария Nunez MD as Consulting Physician (Hematology and Oncology)  Steven Hammond MD as Consulting Physician (Cardiology)    Chief Complaint: Nausea, vomiting, generalized pain    Subjective       The patient has been afebrile for the last 24 hours.  The patient is on room air, hemodynamically stable, and is tolerating antimicrobial therapy.  Patient is currently in the chair.  Patient is status post GREGORY      Review of Systems:   Review of Systems   Constitutional: Negative.    HENT: Negative.     Eyes: Negative.    Respiratory: Negative.     Cardiovascular: Negative.    Gastrointestinal:  Positive for nausea.   Endocrine: Negative.    Genitourinary: Negative.    Musculoskeletal: Negative.         Generalized pain   Skin: Negative.    Neurological: Negative.    Psychiatric/Behavioral: Negative.     All other systems reviewed and are negative.       Objective     Vital Signs  Temp:  [97.4 °F (36.3 °C)-98.1 °F (36.7 °C)] 97.6 °F (36.4 °C)  Heart Rate:  [60-83] 60  Resp:  [12-17] 17  BP: ()/(61-81) 173/68    Physical Exam:  Physical Exam  Vitals and nursing note reviewed.   Constitutional:       General: She is not in acute distress.     Appearance: She is well-developed and normal weight. She is ill-appearing. She is not diaphoretic.   HENT:      Head: Normocephalic and atraumatic.   Eyes:      General: No scleral icterus.     Extraocular Movements: Extraocular movements intact.      Conjunctiva/sclera: Conjunctivae normal.      Pupils: Pupils are equal, round, and reactive to light.   Cardiovascular:      Rate and Rhythm: Normal rate and regular rhythm.      Heart sounds: Normal heart sounds, S1 normal and S2 normal. No murmur heard.  Pulmonary:      Effort: Pulmonary effort is normal. No respiratory distress.      Breath sounds: Normal breath sounds. No stridor.  No wheezing or rales.   Chest:      Chest wall: No tenderness.   Abdominal:      General: Bowel sounds are normal. There is no distension.      Palpations: Abdomen is soft. There is no mass.      Tenderness: There is no abdominal tenderness. There is no guarding.   Musculoskeletal:         General: No swelling, tenderness or deformity. Normal range of motion.      Cervical back: Neck supple.   Skin:     General: Skin is warm and dry.      Coloration: Skin is not pale.      Findings: No bruising, erythema or rash.   Neurological:      General: No focal deficit present.      Mental Status: She is alert and oriented to person, place, and time. Mental status is at baseline.      Cranial Nerves: No cranial nerve deficit.   Psychiatric:         Mood and Affect: Mood normal.          Results Review:    I have reviewed all clinical data, test, lab, and imaging results.     Radiology  No Radiology Exams Resulted Within Past 24 Hours    Cardiology    Laboratory    Results from last 7 days   Lab Units 01/08/25  0401 01/07/25  0453 01/06/25  0553 01/06/25  0022   WBC 10*3/mm3 3.80 4.64 4.89 11.40*   HEMOGLOBIN g/dL 10.8* 10.7* 11.5* 12.6   HEMATOCRIT % 33.7* 34.1 36.4 37.8   PLATELETS 10*3/mm3 43* 36* 42* 52*     Results from last 7 days   Lab Units 01/09/25  1212 01/08/25  1514 01/08/25  0401 01/07/25  0453 01/06/25  0553 01/06/25  0022   SODIUM mmol/L 137  --  134* 135* 136 135*   POTASSIUM mmol/L 4.0 4.0 3.6 3.1* 3.2* 3.4*   CHLORIDE mmol/L 107  --  107 108* 106 100   CO2 mmol/L 21.4*  --  19.7* 16.9* 16.6* 19.5*   BUN mg/dL 23  --  39* 50* 52* 56*   CREATININE mg/dL 0.74  --  0.86 1.09* 1.79* 2.05*   GLUCOSE mg/dL 107*  --  113* 97 116* 117*   ALBUMIN g/dL  --   --  3.0* 2.8* 3.3* 3.2*   BILIRUBIN mg/dL  --   --  0.5 0.7 1.4* 1.4*   ALK PHOS U/L  --   --  52 39 43 64   AST (SGOT) U/L  --   --  33* 36* 36* 46*   ALT (SGPT) U/L  --   --  14 17 18 24   CALCIUM mg/dL 8.5*  --  8.7 7.6* 7.7* 9.0         Results from last 7 days    Lab Units 01/03/25  1015   SED RATE mm/hr 49*         Microbiology   Microbiology Results (last 10 days)       Procedure Component Value - Date/Time    Blood Culture - Blood, Hand, Right [[id number]]  (Normal) Collected: 01/08/25 1112    Lab Status: Preliminary result Specimen: Blood from Hand, Right Updated: 01/09/25 1131     Blood Culture No growth at 24 hours    Urine Culture - Urine, Straight Cath [[id number]]  (Abnormal) Collected: 01/06/25 0035    Lab Status: Final result Specimen: Urine from Straight Cath Updated: 01/07/25 1035     Urine Culture 50,000 CFU/mL Streptococcus agalactiae (Group B)     Comment:   This organism is considered to be universally susceptible to penicillin.  No further antibiotic testing will be performed.       Narrative:      Colonization of the urinary tract without infection is common. Treatment is discouraged unless the patient is symptomatic, pregnant, or undergoing an invasive urologic procedure.    Blood Culture - Blood, Arm, Right [[id number]]  (Abnormal) Collected: 01/06/25 0033    Lab Status: Final result Specimen: Blood from Arm, Right Updated: 01/08/25 0707     Blood Culture Streptococcus agalactiae (Group B)     Isolated from Aerobic and Anaerobic Bottles     Gram Stain Anaerobic Bottle Gram positive cocci in pairs and chains      Aerobic Bottle Gram positive cocci in pairs and chains    Narrative:      Refer to previous blood culture collected on 01/06/2025 at 0032 for MICs.        Less than seven (7) mL's of blood was collected.  Insufficient quantity may yield false negative results.    Blood Culture - Blood, Arm, Left [[id number]]  (Abnormal)  (Susceptibility) Collected: 01/06/25 0032    Lab Status: Final result Specimen: Blood from Arm, Left Updated: 01/08/25 0706     Blood Culture Streptococcus agalactiae (Group B)     Isolated from Aerobic and Anaerobic Bottles     Gram Stain Anaerobic Bottle Gram positive cocci in pairs and chains      Aerobic Bottle Gram positive  cocci in pairs and chains    Susceptibility        Streptococcus agalactiae (Group B)      MICK      Ceftriaxone Susceptible      Penicillin G Susceptible      Vancomycin Susceptible                           Blood Culture ID, PCR - Blood, Arm, Left [[id number]]  (Abnormal) Collected: 01/06/25 0032    Lab Status: Final result Specimen: Blood from Arm, Left Updated: 01/06/25 1200     BCID, PCR Streptococcus agalactiae (Group B). Identification by BCID2 PCR.     BOTTLE TYPE Anaerobic Bottle    Respiratory Panel PCR w/COVID-19(SARS-CoV-2) [facility] In-House, NP Swab in UTM/VTM, 2 HR TAT - Swab, Nasopharynx [[id number]]  (Normal) Collected: 01/06/25 0024    Lab Status: Final result Specimen: Swab from Nasopharynx Updated: 01/06/25 0130     ADENOVIRUS, PCR Not Detected     Coronavirus 229E Not Detected     Coronavirus HKU1 Not Detected     Coronavirus NL63 Not Detected     Coronavirus OC43 Not Detected     COVID19 Not Detected     Human Metapneumovirus Not Detected     Human Rhinovirus/Enterovirus Not Detected     Influenza A PCR Not Detected     Influenza B PCR Not Detected     Parainfluenza Virus 1 Not Detected     Parainfluenza Virus 2 Not Detected     Parainfluenza Virus 3 Not Detected     Parainfluenza Virus 4 Not Detected     RSV, PCR Not Detected     Bordetella pertussis pcr Not Detected     Bordetella parapertussis PCR Not Detected     Chlamydophila pneumoniae PCR Not Detected     Mycoplasma pneumo by PCR Not Detected    Narrative:      In the setting of a positive respiratory panel with a viral infection PLUS a negative procalcitonin without other underlying concern for bacterial infection, consider observing off antibiotics or discontinuation of antibiotics and continue supportive care. If the respiratory panel is positive for atypical bacterial infection (Bordetella pertussis, Chlamydophila pneumoniae, or Mycoplasma pneumoniae), consider antibiotic de-escalation to target atypical bacterial  infection.            Medication Review:       Schedule Meds  cefTRIAXone, 2,000 mg, Intravenous, Q24H  heparin (porcine), 5,000 Units, Subcutaneous, Q8H  insulin lispro, 2-7 Units, Subcutaneous, 4x Daily AC & at Bedtime  pantoprazole, 40 mg, Oral, Q AM  sodium chloride, 10 mL, Intravenous, Q12H  sodium chloride, 10 mL, Intravenous, Q12H        Infusion Meds       PRN Meds    Calcium Replacement - Follow Nurse / BPA Driven Protocol    dextrose    dextrose    glucagon (human recombinant)    Magnesium Standard Dose Replacement - Follow Nurse / BPA Driven Protocol    nitroglycerin    ondansetron    Phosphorus Replacement - Follow Nurse / BPA Driven Protocol    Potassium Replacement - Follow Nurse / BPA Driven Protocol    sodium chloride    sodium chloride    sodium chloride    sodium chloride    sodium chloride        Assessment & Plan       Antimicrobial Therapy   1.  IV ceftriaxone        2.        3.        4.        5.          Assessment     Group B streptococcus bacteremia.    Patient has a pacemaker placed in the past and she had remote history of cardiac surgery.  I believe during childhood secondary to congenital heart disease.  GREGORY performed on 1/9/2025 and there was no vegetation of the heart valves but there was a lesion on one of the pacemaker leads consistent with vegetation versus thrombus.  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    Positive urine culture for group B streptococcus.  I believe this is descending infection.    Metastatic breast cancer currently on targeted biologic therapy (abemaciiclib)     Thrombocytopenia-likely related to the above     Plan     The case was discussed with cardiology service.  The patient is not pacer dependent and she is a poor candidate for removal of the pacemaker leads.  At this point we agreed to treat patient medically with 6 weeks of IV antibiotics.  Recommend  6 weeks of IV ceftriaxone 2 g daily.Last day on 2/18/25  Recommend to repeat GREGORY after 6 weeks    Patient will need a PICC line before discharge-please remove when IV antibiotics are completed.  Standard weekly PICC line care  Weekly labs CBC and creatinine x 6 weeks  Continue supportive care  Okay to discharge from ID standpoint when IV antibiotics are arranged    Please fax all post discharge lab results, imaging studies and correspondence to this fax number [phone]  For any question or concern please contact our service number [phone]    Bria Cooney, CIRO  01/09/25  14:59 EST    Note is dictated utilizing voice recognition software/Dragon

## 2025-01-09 NOTE — PLAN OF CARE
Goal Outcome Evaluation:  Plan of Care Reviewed With: patient        Progress: improving        Pt resting comfortably with no complaints of pain. Pt had one episode of nausea/vomiting overnight. On room air. NPO for GREGORY. Will continue to monitor...

## 2025-01-09 NOTE — ANESTHESIA PREPROCEDURE EVALUATION
Anesthesia Evaluation     Patient summary reviewed and Nursing notes reviewed   no history of anesthetic complications:   NPO Solid Status: > 8 hours  NPO Liquid Status: > 2 hours           Airway   Mallampati: II  TM distance: >3 FB  Neck ROM: limited  Dental    (+) poor dentition        Pulmonary    (+) ,shortness of breath, sleep apnea  Cardiovascular     ECG reviewed    (+) pacemaker pacemaker, hypertension, valvular problems/murmurs TI and MR, CAD, dysrhythmias Bradycardia, Tachycardia, CHF , hyperlipidemia      Neuro/Psych  (+) headaches, dizziness/light headedness, syncope, numbness  GI/Hepatic/Renal/Endo    (+) hepatitis C, diabetes mellitus using insulin    Musculoskeletal     (+) chronic pain, neck pain, radiculopathy  Abdominal    Substance History      OB/GYN          Other   arthritis, blood dyscrasia anemia thrombocytopenia,   history of cancer    ROS/Med Hx Other: Additional History:  Breast cancer with bone mets, sick sinus syndrome, complete heart block, pulmonary hypertension, tetrology of fallot, allergies    Pain pump    Echo:    Echocardiogram Findings    Left Ventricle Left ventricular systolic function is normal. Left ventricular ejection fraction appears to be 56 - 60%.   Septal wall motion is abnormal, consistent with a bundle branch block. Diastolic flattening of the interventricular septum consistent with right ventricle volume overload. Normal left ventricular cavity size and wall thickness noted. Left ventricular diastolic function is consistent with (grade Ia w/high LAP) impaired relaxation.  Right Ventricle The right ventricular cavity is moderately dilated. Right ventricular wall thickness is consistent with severe hypertrophy. Severely reduced right ventricular systolic function noted. Electronic lead present in the ventricle.  Left Atrium Normal left atrial size and volume noted. The interatrial septum does not appear to be redundant. No interatrial septal aneurysm present. No  lipomatous hypertrophy of the interatrial septum present. Cannot exclude the presence of a patent foramen ovale. Saline test for shunting not performed.  Right Atrium The right atrial cavity is moderate to severely dilated.  The inferior vena cava is dilated. An electronic lead is present in the right atrium.  Aortic Valve The aortic valve is grossly normal in structure. The aortic valve appears trileaflet. No significant aortic valve regurgitation is present. No hemodynamically significant aortic valve stenosis is present.  Mitral Valve There is moderate, bileaflet mitral valve thickening present. Trace to mild mitral valve regurgitation is present with a posteriorly-directed jet noted. Appears mildly rheumatic  Tricuspid Valve The tricuspid valve is abnormal in structure. The tricuspid valve annulus appears severely dilated. Severe tricuspid valve regurgitation is present. Estimated right ventricular systolic pressure from tricuspid regurgitation is mildly elevated (35-45 mmHg). Mild pulmonary hypertension is present. No evidence of significant tricuspid valve stenosis is present.  Pulmonic Valve The pulmonic valve is not well visualized. There is no pulmonic valve stenosis present.  Greater Vessels No dilation of the aortic root is present. No dilation of the sinuses of Valsalva is present.  Pericardium There is a trivial pericardial effusion. The effusion is fluid filled. There is no evidence of cardiac tamponade. The respiratory variation of mitral valve inflow is less than 30%. The respiratory variation of tricuspid valve inflow is less than 30%.        Stress Test:  · Findings consistent with an indeterminate ECG stress test.  · Left ventricular ejection fraction is normal (Calculated EF = 56%).  · Impressions are consistent with a low risk study.  · Nuclear images reviewed revealing prominent apical slit. Negative for ischemia.    PSH:  CARDIAC SURGERY  SECTION  NECK SURGERY KNEE SURGERY  CARDIAC  ABLATION PACEMAKER IMPLANTATION  MASTECTOMY BREAST RECONSTRUCTION  CARDIAC SURGERY BACK SURGERY  CERVICAL FUSION ANTERIOR WITH ARTIFICIAL DISCECTOMY IMPLANTATION CARDIAC CATHETERIZATION  ENDOSCOPY COLONOSCOPY  ENDOMETRIAL ABLATION PAIN PUMP INSERTION/REVISION        Phys Exam Other: Cervical fusion            Anesthesia Plan    ASA 4     MAC   total IV anesthesia  (Patient identified; pre-operative vital signs, all relevant labs/studies, complete medical/surgical/anesthetic history, full medication list, full allergy list, and NPO status obtained/reviewed; physical assessment performed; anesthetic options, side effects, potential complications, risks, and benefits discussed; questions answered; written anesthesia consent obtained; patient cleared for procedure; anesthesia machine and equipment checked and functioning)  intravenous induction     Anesthetic plan, risks, benefits, and alternatives have been provided, discussed and informed consent has been obtained with: patient.    Plan discussed with CRNA and CAA.    CODE STATUS:    Code Status (Patient has no pulse and is not breathing): CPR (Attempt to Resuscitate)  Medical Interventions (Patient has pulse or is breathing): Full Support

## 2025-01-09 NOTE — CONSULTS
Cardiology [place]  Cardiology progress note  Stanley Lopez MD, PhD      Subjective:     Encounter Date:01/09/2025      Patient ID: Gladys Garrison is a 64 y.o. female.    Chief Complaint: diarrhea, nausea, generalized weakness  Cardiology Consult: request for GREGORY    Referring Physician: Dr. Pfeiffer  Seen and examined by me agree with narrative as discussed with nurse practitioner after face-to-face encounter scruff findings below greater than 50% of total encounter, medical decision making performed by me    HPI:  Gladys Garrison is a 64 y.o. female who presents with nausea, diarrhea, generalized weakness. Ms. Garrison is a patient of Dr. Lopez. 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 She underwent device exchange with Medtronic generator December 2023, Medtronic leads were confirmed for RV and RA leads which were placed back in 2007.  She also has metastatic breast cancer diagnosed 2017 with bone mets diagnosed in 2021, history of bilateral mastectomy.  Under therapy evaluation also pain management from oncology standpoint.   She also history of COVID 2021 she was admitted recently in February 2023 with chest pain atypical nature thought to be secondary to metastatic disease.   She has no history of obstructive CAD or coronary intervention.      Last 2D echo March 2023 revealed normal EF 55 to 60% with grade 1 diastolic dysfunction, severely reduced RV systolic function with moderately dilated RV and RA, moderate bileaflet mitral valve thickening with severe TR, mildly elevated pulmonary pressures 35-45, trivial pericardial effusion.      She presents this admission with nausea, diarrhea, weakness. She is noted to have group B  streptococcus bacteremia with unclear source. ID has requested a GREGORY from cardiology as patient has PPM in place and h/o cardiac surgery as child for CHD. She remains on IV antibiotics. Will arrange GREGORY.   ============================================================  Overnight events discussed with staff  Denies any chest pain  Stable shortness of breath  Volume status appears stable  Reviewed nephrology notes along with discussion with nursing staff on the floor today in coordination of care  No new fevers  Remains on antibiotics  Pending transesophageal echo, for evaluation of bacteremia with strep with history of congenital heart disease and cardiac surgery and pacemaker    Historical data copied forward from previous encounters in EMR is unchanged  Review of systems otherwise negative x 14 point review of systems except as mentioned above    EKG shows Paced V rhythm      Past Medical History:   Diagnosis Date    Allergic     Bone cancer     METASTATIC BONE; stage 4    Bradycardia     SECONDARY TO ABLATION    Breast cancer 2017    mets to lymph nodes; did not do radiation    Cancer of unknown origin     Compression fx, thoracic spine, open, initial encounter     Coronary artery disease     COVID-19 09/01/2021    Diabetes mellitus     Heart disease, unspecified     Hepatitis C     RESOLVED WITH MEDICATION    Hyperlipidemia     Hypertension     Obesity (BMI 30-39.9) 02/05/2021    Rib fracture     Per patient    Sleep apnea     no machine    Tachycardia     ATRIAL    Type 2 diabetes mellitus 11/2017       Past Surgical History:   Procedure Laterality Date    BACK SURGERY      neck X 2    BREAST RECONSTRUCTION Bilateral     CARDIAC ABLATION       atrial tachycardia x 5 ablations     CARDIAC CATHETERIZATION      CARDIAC ELECTROPHYSIOLOGY PROCEDURE N/A 12/11/2023    Procedure: Pacemaker RV lead insertion with generator change out. Medtronic;  Surgeon: Steven Hammond MD;  Location: [facility] CATH INVASIVE LOCATION;   Service: Cardiovascular;  Laterality: N/A;    CARDIAC SURGERY      stent placed in aorta    CARDIAC SURGERY      6 surgeries as baby     CERVICAL FUSION ANTERIOR WITH ARTIFICIAL DISCECTOMY IMPLANTATION N/A 2020    Procedure: C4 VERTEBRECTOMY AND ANTERIOR CERIVCAL DISCECTOMY WITH FUSION OF CERVICAL THREE THROUGH FIVE WITH REMOVAL OF HARDWARE C5-C6;  Surgeon: Mark Kaba MD;  Location: [facility] MAIN OR;  Service: Neurosurgery;  Laterality: N/A;     SECTION      x2    COLONOSCOPY N/A 10/23/2020    Procedure: COLONOSCOPY WITH POLYPECTOMY X6;  Surgeon: Stanley Bourne MD;  Location: [facility] ENDOSCOPY;  Service: Gastroenterology;  Laterality: N/A;  POLYPS, INTERNAL HEMORRHOIDS    ENDOMETRIAL ABLATION      REMOVAL SCAR TISSUE UTERINE    ENDOSCOPY N/A 10/23/2020    Procedure: ESOPHAGOGASTRODUODENOSCOPY WITH BIOPSY X 1 AREA;  Surgeon: Stanley Bourne MD;  Location: [facility] ENDOSCOPY;  Service: Gastroenterology;  Laterality: N/A;  GASTRITIS, ESOPHAGITIS, HIATAL HERNIA    INSERT / REPLACE / REMOVE PACEMAKER      KNEE SURGERY      MASTECTOMY Bilateral     NECK SURGERY      PACEMAKER IMPLANTATION      PAIN PUMP INSERTION/REVISION N/A 2022    Procedure: PAIN PUMP INSERTION AND INTRATHECAL CATHETER PLACEMENT;  Surgeon: Chuck Hunter MD;  Location: [facility] MAIN OR;  Service: Pain Management;  Laterality: N/A;       Family History   Problem Relation Age of Onset    Heart disease Mother     Stroke Mother     Lung cancer Mother     Aneurysm Father     Diabetes Sister     No Known Problems Brother     No Known Problems Brother     Diabetes type I Half-Sister     Thyroid cancer Half-Sister     Cancer Maternal Aunt     Heart attack Sister     Thyroid disease Sister     No Known Problems Sister        Social History     Socioeconomic History    Marital status: Legally     Number of children: 2   Tobacco Use    Smoking status: Never     Passive exposure: Never    Smokeless  "tobacco: Never   Vaping Use    Vaping status: Never Used   Substance and Sexual Activity    Alcohol use: Yes     Comment: drinks rarely    Drug use: Never    Sexual activity: Defer         Allergies   Allergen Reactions    Oxycodone Nausea And Vomiting    Promethazine Other (See Comments)     Hyperactive mean    Tape Other (See Comments)     .blisters      Adhesive Tape Rash    Flexeril [Cyclobenzaprine] Rash       Current Medications:   Scheduled Meds:cefTRIAXone, 2,000 mg, Intravenous, Q24H  heparin (porcine), 5,000 Units, Subcutaneous, Q8H  insulin lispro, 2-7 Units, Subcutaneous, 4x Daily AC & at Bedtime  pantoprazole, 40 mg, Oral, Q AM  sodium chloride, 10 mL, Intravenous, Q12H  sodium chloride, 10 mL, Intravenous, Q12H      Continuous Infusions:lactated ringers, 75 mL/hr        Review of Systems   Constitutional: Positive for malaise/fatigue. Negative for chills and diaphoresis.   Cardiovascular:  Negative for chest pain, dyspnea on exertion, irregular heartbeat, leg swelling, near-syncope, orthopnea, palpitations, paroxysmal nocturnal dyspnea and syncope.   Respiratory:  Negative for cough, shortness of breath, sleep disturbances due to breathing and sputum production.    Gastrointestinal:  Positive for diarrhea and nausea. Negative for change in bowel habit.   Genitourinary:  Negative for urgency.   Neurological:  Negative for dizziness and headaches.   Psychiatric/Behavioral:  Negative for altered mental status.             Objective:         /72 (BP Location: Right arm, Patient Position: Lying)   Pulse 67   Temp 97.4 °F (36.3 °C) (Oral)   Resp 15   Ht 152.4 cm (60\")   Wt 46.3 kg (102 lb)   LMP  (LMP Unknown)   SpO2 98%   BMI 19.92 kg/m²     Physical Exam:  General Appearance:    Alert, cooperative, in no acute distress                                Head: Atraumatic, normocephalic, PERRLA               Neck:   supple, trachea midline, no thyromegaly, no carotid bruit, no JVD   Lungs:     " "Clear to auscultation,respirations regular, even and               unlabored    Heart:    Regular rhythm and normal rate, normal S1 and S2   Abdomen:     Normal bowel sounds, no masses, no organomegaly, soft  nontender, nondistended, no guarding, no rebound  tenderness   Extremities:   Moves all extremities well, no edema, no cyanosis, no  redness   Pulses:   Pulses palpable and equal bilaterally   Skin:   No bleeding, bruising or rash   Neurologic:   Awake, alert, oriented x3     Unchanged from prior            ASCVD Risk Score::  The 10-year ASCVD risk score (Kyle SHANKAR, et al., 2019) is: 12.1%    Values used to calculate the score:      Age: 64 years      Sex: Female      Is Non- : No      Diabetic: Yes      Tobacco smoker: No      Systolic Blood Pressure: 144 mmHg      Is BP treated: Yes      HDL Cholesterol: 51 mg/dL      Total Cholesterol: 171 mg/dL      Lab Review:     Results from last 7 days   Lab Units 01/08/25  1514 01/08/25  0401 01/07/25  0453   SODIUM mmol/L  --  134* 135*   POTASSIUM mmol/L 4.0 3.6 3.1*   CHLORIDE mmol/L  --  107 108*   CO2 mmol/L  --  19.7* 16.9*   BUN mg/dL  --  39* 50*   CREATININE mg/dL  --  0.86 1.09*   GLUCOSE mg/dL  --  113* 97   CALCIUM mg/dL  --  8.7 7.6*   AST (SGOT) U/L  --  33* 36*   ALT (SGPT) U/L  --  14 17         Results from last 7 days   Lab Units 01/08/25  0401 01/07/25  0453   WBC 10*3/mm3 3.80 4.64   HEMOGLOBIN g/dL 10.8* 10.7*   HEMATOCRIT % 33.7* 34.1   PLATELETS 10*3/mm3 43* 36*         Results from last 7 days   Lab Units 01/07/25  0453 01/06/25  0553   MAGNESIUM mg/dL 2.1 2.0           Invalid input(s): \"LDLCALC\"            Recent Radiology:  Imaging Results (Most Recent)       Procedure Component Value Units Date/Time    US Renal Bilateral [[id number]] Collected: 01/06/25 0932     Updated: 01/06/25 0935    Narrative:      US RENAL BILATERAL    Date of Exam: 1/6/2025 9:08 AM EST    Indication: JOSE, r/o obstruction.    Comparison: No " comparisons available.    Technique: Grayscale and color Doppler ultrasound evaluation of the kidneys and urinary bladder was performed.      Findings:  Right kidney measures 9.8 cm length. Left kidney measures 9.7 cm length. Both kidneys are normal in echogenicity. No hydronephrosis. Grossly unremarkable incompletely distended urinary bladder      Impression:      Impression:  Normal ultrasound appearance of the kidneys with no evidence of obstructive uropathy        Electronically Signed: Roque Penn    1/6/2025 9:33 AM EST    Workstation ID: [id number]    CT Cervical Spine Without Contrast [[id number]] Collected: 01/06/25 0158     Updated: 01/06/25 0206    Narrative:      CT CERVICAL SPINE WO CONTRAST    Date of Exam: 1/6/2025 1:09 AM EST    Indication: Neck pain after fall.    Comparison: 3/29/2023    Technique: Axial CT images were obtained of the cervical spine without contrast administration.  Sagittal and coronal reconstructions were performed.  Automated exposure control and iterative reconstruction methods were used.    Findings:  Patient is fused from C3-C7 with an anterior plate and screws from C3-C6. Cervical alignment is normal. There is multilevel facet arthropathy. There is disc space narrowing at C7-T1. There is scoliosis at the cervicothoracic junction. No acute cervical   spine fracture is identified. Paraspinal soft tissues are grossly normal.      Impression:      No acute cervical spine fracture. No significant interval change.      Electronically Signed: Brad Foster MD    1/6/2025 2:04 AM EST    Workstation ID: [id number]    CT Facial Bones Without Contrast [[id number]] Collected: 01/06/25 0154     Updated: 01/06/25 0200    Narrative:      CT FACIAL BONES WO CONTRAST    Date of Exam: 1/6/2025 1:09 AM EST    Indication: Fall with bruising.    Comparison: CT orbit 10/12/2018    Technique: Axial CT images were obtained from the inferior aspect of the mandible through the frontal sinuses without  contrast administration.  Sagittal and coronal reconstructions were performed.  Automated exposure control and iterative reconstruction   methods were used.    Findings:  Temporomandibular joints demonstrate normal alignment. The mandible is intact. No acute facial bone fracture. No evidence of acute sinus disease. The globes and orbits appear grossly normal.      Impression:      Negative facial bone CT.        Electronically Signed: Brad Foster MD    1/6/2025 1:58 AM EST    Workstation ID: [id number]    CT Abdomen Pelvis Without Contrast [[id number]] Collected: 01/06/25 0144     Updated: 01/06/25 0158    Narrative:      CT ABDOMEN PELVIS WO CONTRAST    Date of Exam: 1/6/2025 1:09 AM EST    Indication: lower abdominal pain, diarrhea.    Comparison: CT abdomen pelvis August 5, 2024    Technique: Axial CT images were obtained of the abdomen and pelvis without the administration of contrast. Sagittal and coronal reconstructions were performed.  Automated exposure control and iterative reconstruction methods were used.    Findings:  Lung Bases:     There is mild right middle lobe atelectasis. There is bilateral basilar atelectasis. The heart is enlarged. There are postsurgical changes of the pulmonary artery. Heavy coronary artery calcifications are seen.    Liver:  The liver is of normal size. There are no focal liver lesions with noncontrast technique.    Biliary/Gallbladder:    Layering density in the gallbladder may be stones or sludge. The biliary tree is normal.    Spleen:  There is mild splenomegaly.    Pancreas:    Pancreas is normal. There is no evidence of pancreatic mass or peripancreatic fluid.    Kidneys:    There is a nonobstructing stone of the left kidney. There is no hydronephrosis of either kidney.    Adrenals:    Adrenal glands are unremarkable.    Retroperitoneal/Lymph Nodes/Vasculature:    No retroperitoneal adenopathy is identified.    Gastrointestinal/Mesentery:    The bowel loops are  non-dilated without wall thickening or mass. The appendix appears within normal limits. No evidence of obstruction. No free air. No mesenteric fluid collections identified.    Bladder:    The bladder is normal.    Genital:     Unremarkable          Bony Structures:     There are old compression fractures of T12 and L1. A benign hemangioma of L4 is unchanged. There is no acute fracture.        Impression:      Impression:  1.No acute abdominal or pelvic abnormality.  2.Mild splenomegaly.  3.Nonobstructing left renal stone.  4.Layering density in the gallbladder may be stones or sludge.                Electronically Signed: Rocky Heredia MD    1/6/2025 1:56 AM EST    Workstation ID: [id number]    CT Head Without Contrast [[id number]] Collected: 01/06/25 0148     Updated: 01/06/25 0156    Narrative:      CT HEAD WO CONTRAST    HISTORY:  Head injury from fall. Bruising.    TECHNIQUE:  Axial unenhanced head CT with coronal reformats. Radiation dose reduction techniques included automated exposure control or exposure modulation based on body size.    COMPARISON:  11/2/2024, 11/5/2023    FINDINGS:  Ventricular size and configuration are normal. No acute infarct or hemorrhage is identified. There are no masses. There is no skull fracture. Atherosclerotic calcifications are present in the vertebral arteries and carotid siphons. Mild chronic small   vessel ischemic changes are present in the white matter.      Impression:      Senescent changes without acute abnormality.          Electronically Signed: Brad Foster MD    1/6/2025 1:54 AM EST    Workstation ID: [id number]    XR Chest 1 View [[id number]] Collected: 01/06/25 0036     Updated: 01/06/25 0040    Narrative:      XR CHEST 1 VW    Date of Exam: 1/6/2025 12:20 AM EST    Indication: cough, soa    Comparison: 11/2/2024    Findings:  There is a small stent graft in the proximal descending aorta. The heart remains enlarged status post CABG and valve repair. Left-sided  multipolar pacer is present. Patient is status post mastectomy with bilateral breast reconstructions. Patient is also   status post cervical spinal fusion. No acute infiltrates are identified. No pneumothorax.      Impression:      1.Cardiomegaly with postoperative changes of CABG and valve repair.  2.No acute infiltrates identified.        Electronically Signed: Brad Foster MD    1/6/2025 12:38 AM EST    Workstation ID: [id number]              ECHOCARDIOGRAM:    Results for orders placed during the hospital encounter of 03/07/23    Adult Transthoracic Echo Complete W/ Cont if Necessary Per Protocol    Interpretation Summary    Left ventricular systolic function is normal. Left ventricular ejection fraction appears to be 56 - 60%.    Left ventricular diastolic function is consistent with (grade Ia w/high LAP) impaired relaxation.    Severely reduced right ventricular systolic function noted.    The right ventricular cavity is moderately dilated.    The right atrial cavity is moderate to severely  dilated.    There is moderate, bileaflet mitral valve thickening present.    Severe tricuspid valve regurgitation is present.    Estimated right ventricular systolic pressure from tricuspid regurgitation is mildly elevated (35-45 mmHg).    Mild pulmonary hypertension is present.    There is a trivial pericardial effusion. There is no evidence of cardiac tamponade.            Assessment:         Active Hospital Problems    Diagnosis  POA    **JOSE (acute kidney injury) [N17.9]  Yes     Nausea / diarrhea   JOSE  Group B bacteremia- ID requesting GREGORY  history of congenital heart disease with tetralogy of Fallot with surgically repaired VSD  complete heart block with dual-chamber pacemaker  Hypertension  Hyperlipidemia  pulm hypertension / valvular heart disease in conjunction with tetralogy with both pulmonic and tricuspid valve abnormalities  DM II  metastatic breast cancer diagnosed 2017 with bone mets diagnosed in 2021,  history of bilateral mastectomy.  Under therapy evaluation also pain management from oncology standpoint  She also history of COVID 2021 she was admitted recently in February 2023 with chest pain atypical nature thought to be secondary to metastatic disease  She has no history of obstructive CAD or coronary intervention     Plan:   Today, GREGORY for eval GBS and bacteremia in seetin go PPM/congenital ht ds  Other comorbidities assessed and managed today as well.   Npo after midnight confirmed  Chest pain-free  No difficulty swallowing    Continue to follow renal function electrolytes replace potassium, IV fluids continue  Appreciate renal involvement  Antibiotics will continue    No evidence of ACS or unstable angina    Cardiac comorbidities all manage independently today documented above  Telemetry reviewed interpreted by me demonstrates paced rhythm, reviewed interpreted daily      Further recommendations based on clinical course and findings    Stanley Lopez MD  01/07/25  09:12 EST

## 2025-01-09 NOTE — PLAN OF CARE
Goal Outcome Evaluation:  Plan of Care Reviewed With: patient   Pt presents as a 65 y/o f admitted to [facility] on 1/5/25 with diarrhea, nausea and generalized weakness. Pt has JOSE and thrombocytopenia and bacteremia. Pt has hx metastatic breast cancer diagnosed 2017 with bone mets diagnosed in 2021, history of bilateral mastectomy, HTN, OA, pulmonary hypertension, tetralogy of fallot, DM, OP, disorder of spine, pacemaker. Pt had a fall 2 days ago and she hit her face. CT head (-). CT C-spine (-). CT facial bones (-). CXR (-). Pt A and O x 4 and anxious. Pt removing her leads impulsively to get to bathroom. At baseline, pt lives with ex-spouse and other family and uses no AD for independence with household mobility. She was working at [employer] up to 2 weeks ago. Pt reports she lives in the basement and goes upstairs to cook and clean. This date, pt requires min assist for bed mobility and transfers. She ambulated 45 feet with rolling walker with min assist of 1. Pt required mod assist to ambulate without the walker. Patient is a high risk of injurious falls and unsafe to return to prior living environment at this time.  PT will follow pt as she is below her baseline for mobility with generalized weakness, standing balance deficits and decreased functional activity tolerance. PT recommends pt to have OT evaluation as well- hospitalist will be notified. PT recommendation at this time is Skilled Nursing Facility pending pt progress.              Anticipated Discharge Disposition (PT): skilled nursing facility

## 2025-01-09 NOTE — PROGRESS NOTES
Hematology/Oncology Inpatient Progress Note    PATIENT NAME: Gladys Garrison  : 1960  MRN: [id number]    CHIEF COMPLAINT: Nausea, vomiting, diarrhea    HISTORY OF PRESENT ILLNESS:      Gladys Garrison is a 64 y.o. female with past medical history significant for tetralogy of Fallot, coarctation of aorta, VSD status post repair, coarctation of aorta S/P stent who presented to [facility] on 2025 with complaints of generalized weakness.  He reports she has been having diarrhea for several days that is dark in color associated with nausea and multiple episodes of vomiting.  She has generalized abdominal cramps, subjective fever and chills and generalized weakness.  She also reports a fall, no loss of consciousness.  CT imaging of the head and facial bones with no acute findings or significant interval changes.  She was treated for a UTI about 1 month ago.  She also reports she has not urinated in several days.  Workup in ED significant for JOSE.  She was noted to have hypotension that slightly improved with IV fluids, temperature of 100.1 °F.  She was admitted for further evaluation and workup.    2025 CT abdomen pelvis without contrast showed no acute abdominal or pelvic abnormality.  Mild splenomegaly.  Nonobstructing left renal stone.  Layering density in the gallbladder may be stones versus sludge.  Renal ultrasound was completed that was normal with no evidence of obstructive uropathy.    Gastroenterology was consulted and recommending stool studies for C. difficile and gastrointestinal panel.    Urine cultures completed.  Respiratory panel negative.  Preliminary blood cultures with gram-positive cocci in pairs and chains.  Infectious disease has been consulted.    25  Hematology/Oncology was consulted.  She is established with Dr. Nunez for history of metastatic breast cancer currently on exemestane/abemaciclib.     Patient has developed nausea vomiting along with diarrhea.   She denies fevers or chills.  She has been on abemaciclib.  She has not had any bleeding issues.  She still feels weak and ill      Subjective     Denies any issues    ROS:  Review of Systems   Constitutional:  Positive for fatigue. Negative for chills and fever.   HENT:  Negative for congestion, drooling, ear discharge, rhinorrhea, sinus pressure and tinnitus.    Eyes:  Negative for photophobia, pain and discharge.   Respiratory:  Negative for apnea, choking and stridor.    Cardiovascular:  Negative for palpitations.   Gastrointestinal:  Negative for abdominal distention, abdominal pain and anal bleeding.   Endocrine: Negative for polydipsia and polyphagia.   Genitourinary:  Negative for decreased urine volume, flank pain and genital sores.   Musculoskeletal:  Negative for gait problem, neck pain and neck stiffness.   Skin:  Negative for color change, rash and wound.   Neurological:  Positive for weakness. Negative for tremors, seizures, syncope, facial asymmetry and speech difficulty.   Hematological:  Negative for adenopathy.   Psychiatric/Behavioral:  Negative for agitation, confusion, hallucinations and self-injury. The patient is not hyperactive.         MEDICATIONS:        Scheduled Meds:  cefTRIAXone, 2,000 mg, Intravenous, Q24H  heparin (porcine), 5,000 Units, Subcutaneous, Q8H  insulin lispro, 2-7 Units, Subcutaneous, 4x Daily AC & at Bedtime  sodium chloride, 10 mL, Intravenous, Q12H  sodium chloride, 10 mL, Intravenous, Q12H       Continuous Infusions:      PRN Meds:    Calcium Replacement - Follow Nurse / BPA Driven Protocol    dextrose    dextrose    glucagon (human recombinant)    Magnesium Standard Dose Replacement - Follow Nurse / BPA Driven Protocol    nitroglycerin    ondansetron    Phosphorus Replacement - Follow Nurse / BPA Driven Protocol    Potassium Replacement - Follow Nurse / BPA Driven Protocol    sodium chloride    sodium chloride    sodium chloride    sodium chloride    sodium chloride  "    ALLERGIES:    Allergies   Allergen Reactions    Oxycodone Nausea And Vomiting    Promethazine Other (See Comments)     Hyperactive mean    Tape Other (See Comments)     .blisters      Adhesive Tape Rash    Flexeril [Cyclobenzaprine] Rash       Objective    VITALS:   /52 (BP Location: Left arm, Patient Position: Sitting)   Pulse 67   Temp 97.6 °F (36.4 °C) (Oral)   Resp 18   Ht 152.4 cm (60\")   Wt 46.3 kg (102 lb)   LMP  (LMP Unknown)   SpO2 100%   BMI 19.92 kg/m²     PHYSICAL EXAM: (performed by MD)  Physical Exam  Vitals and nursing note reviewed.   Constitutional:       General: She is not in acute distress.     Appearance: She is not diaphoretic.   HENT:      Head: Normocephalic and atraumatic.   Eyes:      General: No scleral icterus.        Right eye: No discharge.         Left eye: No discharge.      Conjunctiva/sclera: Conjunctivae normal.   Neck:      Thyroid: No thyromegaly.   Cardiovascular:      Rate and Rhythm: Normal rate and regular rhythm.      Heart sounds: Normal heart sounds.      No friction rub. No gallop.   Pulmonary:      Effort: Pulmonary effort is normal. No respiratory distress.      Breath sounds: No stridor. No wheezing.   Abdominal:      General: Bowel sounds are normal.      Palpations: Abdomen is soft. There is no mass.      Tenderness: There is no abdominal tenderness. There is no guarding or rebound.   Musculoskeletal:         General: No tenderness. Normal range of motion.      Cervical back: Normal range of motion and neck supple.   Lymphadenopathy:      Cervical: No cervical adenopathy.   Skin:     General: Skin is warm.      Findings: No erythema or rash.   Neurological:      Mental Status: She is alert and oriented to person, place, and time.      Motor: No abnormal muscle tone.   Psychiatric:         Behavior: Behavior normal.       I have reexamined the patient and the results are consistent with the previously documented exam. Мария Nunez MD  "     RECENT LABS:  Lab Results (last 24 hours)       Procedure Component Value Units Date/Time    Basic Metabolic Panel [[id number]]  (Abnormal) Collected: 01/09/25 1212    Specimen: Blood Updated: 01/09/25 1248     Glucose 107 mg/dL      BUN 23 mg/dL      Creatinine 0.74 mg/dL      Sodium 137 mmol/L      Potassium 4.0 mmol/L      Comment: Specimen hemolyzed.  Result may be falsely elevated.        Chloride 107 mmol/L      CO2 21.4 mmol/L      Calcium 8.5 mg/dL      BUN/Creatinine Ratio 31.1     Anion Gap 8.6 mmol/L      eGFR 91.6 mL/min/1.73     Narrative:      GFR Categories in Chronic Kidney Disease (CKD)      GFR Category          GFR (mL/min/1.73)    Interpretation  G1                     90 or greater         Normal or high (1)  G2                      60-89                Mild decrease (1)  G3a                   45-59                Mild to moderate decrease  G3b                   30-44                Moderate to severe decrease  G4                    15-29                Severe decrease  G5                    14 or less           Kidney failure          (1)In the absence of evidence of kidney disease, neither GFR category G1 or G2 fulfill the criteria for CKD.    eGFR calculation 2021 CKD-EPI creatinine equation, which does not include race as a factor    POC Glucose 4x Daily Before Meals & at Bedtime [[id number]]  (Normal) Collected: 01/09/25 1140    Specimen: Blood Updated: 01/09/25 1142     Glucose 95 mg/dL      Comment: Serial Number: [id number]:  [id number]       Blood Culture - Blood, Hand, Right [[id number]]  (Normal) Collected: 01/08/25 1112    Specimen: Blood from Hand, Right Updated: 01/09/25 1131     Blood Culture No growth at 24 hours    POC Glucose 4x Daily Before Meals & at Bedtime [[id number]]  (Normal) Collected: 01/09/25 0824    Specimen: Blood Updated: 01/09/25 0825     Glucose 99 mg/dL      Comment: Serial Number: [id number]:  [id number]       POC Glucose Once [[id number]]   (Abnormal) Collected: 01/08/25 2044    Specimen: Blood Updated: 01/08/25 2046     Glucose 129 mg/dL      Comment: Serial Number: [id number]:  [id number]               PENDING RESULTS:     IMAGING REVIEWED:  No radiology results for the last day    Assessment & Plan   ASSESSMENT:    :Metastatic breast cancer presenting as 1.1 cm mixed lytic/sclerotic hypermetabolic lesion in the left hemisacrum suspicious for metastatic disease 1.2 cm sclerotic lesion on L4, small L3 lesion and T11 lesion.  Tumor is ER positive, ID positive and HER-2/bishop negative.  Currently on treatment with abemaciclib (100 mg daily - recent dose reduction due to thrombocytopenia and recurrent UTIs) and exemestane 25 mg daily.  Continue to hold  abemaciclib  Thrombocytopenia secondary to abemaciclib: Platelets 52,000 upon admission.  Today down to 36,000.  Acute drop may be secondary to acute infection.  Continue to monitor closely..  Platelets up to 40,000.  She has no bleeding issues  Hypotension due to volume depletion, JOSE, diarrhea: nephrology following. Supportive care and infectious workup per primary team.  Group B streptococcal bacteremia with possible UTI: Empirically started on ceftriaxone     PLAN  Continue supportive care per primary team and infectious disease  Continue IV antibiotics  GREGORY today  GI panel  Will hold abemaciclib until clinical improvement.   Daily CBCs  Discussed with patient              Electronically signed by Мария Nunez MD, 01/10/25, 7:54 AM EST.

## 2025-01-09 NOTE — PLAN OF CARE
Problem: Adult Inpatient Plan of Care  Goal: Plan of Care Review  Outcome: Progressing  Goal: Patient-Specific Goal (Individualized)  Outcome: Progressing  Goal: Absence of Hospital-Acquired Illness or Injury  Outcome: Progressing  Intervention: Identify and Manage Fall Risk  Recent Flowsheet Documentation  Taken 1/9/2025 1611 by Kristin Faust LPN  Safety Promotion/Fall Prevention:   activity supervised   assistive device/personal items within reach   clutter free environment maintained   lighting adjusted   room organization consistent   safety round/check completed  Taken 1/9/2025 1446 by Kristin Faust LPN  Safety Promotion/Fall Prevention:   activity supervised   assistive device/personal items within reach   clutter free environment maintained   lighting adjusted   room organization consistent   safety round/check completed  Taken 1/9/2025 1220 by Kristin Faust LPN  Safety Promotion/Fall Prevention:   activity supervised   assistive device/personal items within reach   clutter free environment maintained   lighting adjusted   room organization consistent   safety round/check completed  Taken 1/9/2025 1015 by Kristin Faust LPN  Safety Promotion/Fall Prevention: patient off unit  Taken 1/9/2025 0852 by Kristin Faust LPN  Safety Promotion/Fall Prevention:   activity supervised   assistive device/personal items within reach   clutter free environment maintained   lighting adjusted   room organization consistent   safety round/check completed  Intervention: Prevent Skin Injury  Recent Flowsheet Documentation  Taken 1/9/2025 1611 by Kristin Faust LPN  Skin Protection: incontinence pads utilized  Taken 1/9/2025 1220 by Kristin Faust LPN  Skin Protection: incontinence pads utilized  Taken 1/9/2025 0852 by Kristin Faust LPN  Skin Protection: drying agents applied  Intervention: Prevent and Manage VTE (Venous Thromboembolism) Risk  Recent Flowsheet Documentation  Taken 1/9/2025 1220 by Govind  SETH Foster  VTE Prevention/Management:   SCDs (sequential compression devices) off   patient refused intervention  Taken 1/9/2025 0852 by Kristin Faust LPN  VTE Prevention/Management:   bilateral   SCDs (sequential compression devices) off   patient refused intervention  Intervention: Prevent Infection  Recent Flowsheet Documentation  Taken 1/9/2025 1611 by Kristin Faust LPN  Infection Prevention:   cohorting utilized   environmental surveillance performed   hand hygiene promoted   personal protective equipment utilized   rest/sleep promoted   single patient room provided   visitors restricted/screened  Taken 1/9/2025 1446 by Kristin Faust LPN  Infection Prevention:   cohorting utilized   environmental surveillance performed   hand hygiene promoted   personal protective equipment utilized   rest/sleep promoted   single patient room provided   visitors restricted/screened  Taken 1/9/2025 1220 by Kristin Faust LPN  Infection Prevention:   cohorting utilized   environmental surveillance performed   hand hygiene promoted   personal protective equipment utilized   rest/sleep promoted   single patient room provided   visitors restricted/screened  Taken 1/9/2025 0852 by Kristin Faust LPN  Infection Prevention:   cohorting utilized   environmental surveillance performed   hand hygiene promoted   personal protective equipment utilized   rest/sleep promoted   single patient room provided   visitors restricted/screened  Goal: Optimal Comfort and Wellbeing  Outcome: Progressing  Intervention: Monitor Pain and Promote Comfort  Recent Flowsheet Documentation  Taken 1/9/2025 1611 by Kristin Faust LPN  Pain Management Interventions: pain pump in use  Taken 1/9/2025 1220 by Kristin Faust LPN  Pain Management Interventions: pain pump in use  Taken 1/9/2025 0852 by Kristin Faust LPN  Pain Management Interventions:   care clustered   diversional activity provided   pillow support provided   position  adjusted  Intervention: Provide Person-Centered Care  Recent Flowsheet Documentation  Taken 1/9/2025 1611 by Kristin Faust LPN  Trust Relationship/Rapport:   care explained   choices provided  Taken 1/9/2025 1220 by Krsitin Faust LPN  Trust Relationship/Rapport:   care explained   choices provided   thoughts/feelings acknowledged  Taken 1/9/2025 0852 by Kristin Faust LPN  Trust Relationship/Rapport:   care explained   choices provided   thoughts/feelings acknowledged  Goal: Readiness for Transition of Care  Outcome: Progressing   Goal Outcome Evaluation:       Patient went down for GREGORY earlier in shift. Patient has pain pump with no issues noted related to pump. Respirations unlabored with no sob or cough noted at this time. Patient has midline intact with blood return noted upon assessment. Patient has been up in chair this shift. Call light within reach.

## 2025-01-09 NOTE — ANESTHESIA POSTPROCEDURE EVALUATION
Patient: Gladys Garrison    Procedure Summary       Date: 01/09/25 Room / Location: [facility]    Anesthesia Start: 0953 Anesthesia Stop: 1044    Procedure: ADULT TRANSESOPHAGEAL ECHO (GREGORY) W/ CONT IF NECESSARY PER PROTOCOL Diagnosis:       (Endocarditis)      (Positive Blood Cultures)    Scheduled Providers: Ranjit Carrasquillo MD Provider: Caitlyn Houser MD    Anesthesia Type: MAC ASA Status: 4            Anesthesia Type: MAC    Vitals  Vitals Value Taken Time   /68 01/09/25 1116   Temp 97.6 °F (36.4 °C) 01/09/25 1105   Pulse 61 01/09/25 1323   Resp 17 01/09/25 1105   SpO2 99 % 01/09/25 1323   Vitals shown include unfiled device data.        Post Anesthesia Care and Evaluation    Patient location during evaluation: PACU  Patient participation: complete - patient participated  Level of consciousness: awake and alert  Pain management: satisfactory to patient    Airway patency: patent  Anesthetic complications: No anesthetic complications  PONV Status: none  Cardiovascular status: acceptable  Respiratory status: acceptable  Hydration status: acceptable

## 2025-01-09 NOTE — THERAPY EVALUATION
Patient Name: Gladys Garrison  : 1960    MRN: [id number]                              Today's Date: 2025       Admit Date: 2025    Visit Dx:     ICD-10-CM ICD-9-CM   1. Sepsis, due to unspecified organism, unspecified whether acute organ dysfunction present  A41.9 038.9     995.91   2. JOSE (acute kidney injury)  N17.9 584.9   3. Acute abdominal pain  R10.9 789.00     338.19   4. Diarrhea of presumed infectious origin  R19.7 009.3   5. Injury of head, initial encounter  S09.90XA 959.01   6. Contusion of face, initial encounter  S00.83XA 920   7. Neck sprain, initial encounter  S13.9XXA 847.0     Patient Active Problem List   Diagnosis    Hyperlipidemia    Hypertensive disorder    Osteoarthritis of multiple joints    Pulmonary hypertension    Pulmonary valve disorder    Malignant tumor of breast    Tetralogy of Fallot    Tricuspid valve regurgitation    Controlled type 2 diabetes mellitus without complication, with long-term current use of insulin    Vitamin D deficiency    Acquired spondylolisthesis of cervical vertebra    Adjacent segment disease with spinal stenosis    Cervical spondylosis with myelopathy    Cervical myelopathy with cervical radiculopathy    Osteoporosis    S/P cervical spinal fusion    Lesion of lumbar spine    Atherosclerosis of coronary artery of native heart without angina pectoris    Rib pain on right side    Memory loss of unknown cause    Malignant neoplasm of female breast    Malignant neoplasm metastatic to bone    Supraventricular tachycardia    Thrombocytopenia    Systolic congestive heart failure    COVID-19    Elevated AST (SGOT)    Hyponatremia    Pacemaker    Sick sinus syndrome    AV block, complete    Cancer associated pain    Dyspnea    Pain in left knee    Abnormal radiographic examination    Acute sinusitis    Allergic rhinitis    Constipation    Diarrhea    Nausea    Disorder of aorta    Dizziness and giddiness    Headache    Lack of energy    Syncope     Pacemaker complications    Fall against object    Complete heart block    Acquired trigger finger of left middle finger    Contusion of left wrist    JOSE (acute kidney injury)     Past Medical History:   Diagnosis Date    Allergic     Bone cancer     METASTATIC BONE; stage 4    Bradycardia     SECONDARY TO ABLATION    Breast cancer 2017    mets to lymph nodes; did not do radiation    Cancer of unknown origin     Compression fx, thoracic spine, open, initial encounter     Coronary artery disease     COVID-19 2021    Diabetes mellitus     Heart disease, unspecified     Hepatitis C     RESOLVED WITH MEDICATION    Hyperlipidemia     Hypertension     Obesity (BMI 30-39.9) 2021    Rib fracture     Per patient    Sleep apnea     no machine    Tachycardia     ATRIAL    Type 2 diabetes mellitus 2017     Past Surgical History:   Procedure Laterality Date    BACK SURGERY      neck X 2    BREAST RECONSTRUCTION Bilateral     CARDIAC ABLATION       atrial tachycardia x 5 ablations     CARDIAC CATHETERIZATION      CARDIAC ELECTROPHYSIOLOGY PROCEDURE N/A 2023    Procedure: Pacemaker RV lead insertion with generator change out. [employer];  Surgeon: Steven Hammond MD;  Location: [facility] CATH INVASIVE LOCATION;  Service: Cardiovascular;  Laterality: N/A;    CARDIAC SURGERY      stent placed in aorta    CARDIAC SURGERY      6 surgeries as baby     CERVICAL FUSION ANTERIOR WITH ARTIFICIAL DISCECTOMY IMPLANTATION N/A 2020    Procedure: C4 VERTEBRECTOMY AND ANTERIOR CERIVCAL DISCECTOMY WITH FUSION OF CERVICAL THREE THROUGH FIVE WITH REMOVAL OF HARDWARE C5-C6;  Surgeon: Mark Kaba MD;  Location: [facility] MAIN OR;  Service: Neurosurgery;  Laterality: N/A;     SECTION      x2    COLONOSCOPY N/A 10/23/2020    Procedure: COLONOSCOPY WITH POLYPECTOMY X6;  Surgeon: Stanley Bourne MD;  Location: [facility] ENDOSCOPY;  Service: Gastroenterology;  Laterality: N/A;  POLYPS, INTERNAL HEMORRHOIDS     ENDOMETRIAL ABLATION      REMOVAL SCAR TISSUE UTERINE    ENDOSCOPY N/A 10/23/2020    Procedure: ESOPHAGOGASTRODUODENOSCOPY WITH BIOPSY X 1 AREA;  Surgeon: Stanley Bourne MD;  Location: [facility] ENDOSCOPY;  Service: Gastroenterology;  Laterality: N/A;  GASTRITIS, ESOPHAGITIS, HIATAL HERNIA    INSERT / REPLACE / REMOVE PACEMAKER      KNEE SURGERY  2000    MASTECTOMY Bilateral     NECK SURGERY      PACEMAKER IMPLANTATION      PAIN PUMP INSERTION/REVISION N/A 04/05/2022    Procedure: PAIN PUMP INSERTION AND INTRATHECAL CATHETER PLACEMENT;  Surgeon: Chuck Hunter MD;  Location: [facility] MAIN OR;  Service: Pain Management;  Laterality: N/A;      General Information       Row Name 01/09/25 1425          Physical Therapy Time and Intention    Document Type evaluation  -BR     Mode of Treatment physical therapy  -BR       Row Name 01/09/25 1425          General Information    Patient Profile Reviewed yes  -BR     Prior Level of Function independent:;all household mobility;community mobility;gait;transfer  Pt was working at [employer] 2 weeks ago.  -BR     Existing Precautions/Restrictions fall;cardiac  -BR     Barriers to Rehab medically complex  -BR       Row Name 01/09/25 1425          Living Environment    People in Home significant other;other relative(s)  -BR       Row Name 01/09/25 1425          Home Main Entrance    Number of Stairs, Main Entrance one  -BR       Row Name 01/09/25 1425          Stairs Within Home, Primary    Stairs, Within Home, Primary Pt stays in the basement of her home.  -BR     Number of Stairs, Within Home, Primary twelve  -BR       Row Name 01/09/25 1425          Cognition    Orientation Status (Cognition) oriented x 4  -BR       Row Name 01/09/25 1425          Safety Issues/Impairments Affecting Functional Mobility    Impairments Affecting Function (Mobility) endurance/activity tolerance;balance;strength  -BR               User Key  (r) = Recorded By, (t) = Taken By, (c) = Cosigned  By      Initials Name Provider Type    Saira Perez PT Physical Therapist                   Mobility       Row Name 01/09/25 1427          Bed Mobility    Bed Mobility supine-sit  -BR     Supine-Sit [place] (Bed Mobility) verbal cues;minimum assist (75% patient effort)  -BR     Assistive Device (Bed Mobility) bed rails  -BR     Comment, (Bed Mobility) Pt was impulsive and needed cues to slow down and for safety.  -BR       Row Name 01/09/25 1427          Sit-Stand Transfer    Sit-Stand [place] (Transfers) minimum assist (75% patient effort);verbal cues  -BR     Assistive Device (Sit-Stand Transfers) walker, front-wheeled  -BR       Row Name 01/09/25 1427          Gait/Stairs (Locomotion)    [place] Level (Gait) minimum assist (75% patient effort)  -BR     Assistive Device (Gait) walker, front-wheeled  -BR     Patient was able to Ambulate yes  -BR     Distance in Feet (Gait) 45  -BR     Bilateral Gait Deviations forward flexed posture;heel strike decreased  -BR               User Key  (r) = Recorded By, (t) = Taken By, (c) = Cosigned By      Initials Name Provider Type    Saira Perez PT Physical Therapist                   Obj/Interventions       Row Name 01/09/25 1428          Range of Motion Comprehensive    General Range of Motion bilateral lower extremity ROM WFL  -BR       Row Name 01/09/25 1428          Strength Comprehensive (MMT)    Comment, General Manual Muscle Testing (MMT) Assessment BLE strength grossly 3-/5  -BR       Row Name 01/09/25 1428          Balance    Balance Assessment sitting static balance;standing static balance;sitting dynamic balance  -BR     Static Sitting Balance supervision  -BR     Dynamic Sitting Balance contact guard  -BR     Position, Sitting Balance unsupported;sitting edge of bed  -BR     Static Standing Balance minimal assist  -BR     Position/Device Used, Standing Balance supported;walker, rolling  -BR       Row Name 01/09/25 1421           Sensory Assessment (Somatosensory)    Sensory Assessment (Somatosensory) LE sensation intact  -BR               User Key  (r) = Recorded By, (t) = Taken By, (c) = Cosigned By      Initials Name Provider Type    Saira Perez PT Physical Therapist                   Goals/Plan       Row Name 01/09/25 1444          Bed Mobility Goal 1 (PT)    Activity/Assistive Device (Bed Mobility Goal 1, PT) bed mobility activities, all  -BR     [place] Level/Cues Needed (Bed Mobility Goal 1, PT) modified independence  -BR     Time Frame (Bed Mobility Goal 1, PT) long term goal (LTG);2 weeks  -BR       Row Name 01/09/25 1444          Transfer Goal 1 (PT)    Activity/Assistive Device (Transfer Goal 1, PT) transfers, all  -BR     [place] Level/Cues Needed (Transfer Goal 1, PT) modified independence  -BR     Time Frame (Transfer Goal 1, PT) long term goal (LTG);2 weeks  -BR       Row Name 01/09/25 1444          Gait Training Goal 1 (PT)    Activity/Assistive Device (Gait Training Goal 1, PT) gait (walking locomotion)  -BR     [place] Level (Gait Training Goal 1, PT) modified independence  -BR     Distance (Gait Training Goal 1, PT) 150  -BR     Time Frame (Gait Training Goal 1, PT) long term goal (LTG);2 weeks  -BR       Row Name 01/09/25 1444          Therapy Assessment/Plan (PT)    Planned Therapy Interventions (PT) balance training;bed mobility training;gait training;patient/family education;transfer training;neuromuscular re-education;ROM (range of motion);stair training;strengthening  -BR               User Key  (r) = Recorded By, (t) = Taken By, (c) = Cosigned By      Initials Name Provider Type    Saira Perez PT Physical Therapist                   Clinical Impression       Row Name 01/09/25 1429          Pain    Pretreatment Pain Rating 3/10  -BR     Posttreatment Pain Rating 3/10  -BR     Pain Side/Orientation generalized  -BR       Row Name 01/09/25 1444 01/09/25 1429       Plan of Care Review     Plan of Care Reviewed With -- patient  -BR    Outcome Evaluation Pt presents as a 63 y/o f admitted to [facility] on 1/5/25 with diarrhea, nausea and generalized weakness. Pt has JOSE and thrombocytopenia and bacteremia. Pt has hx metastatic breast cancer diagnosed 2017 with bone mets diagnosed in 2021, history of bilateral mastectomy, HTN, OA, pulmonary hypertension, tetralogy of fallot, DM, OP, disorder of spine, pacemaker. Pt had a fall 2 days ago and she hit her face. CT head (-). CT C-spine (-). CT facial bones (-). CXR (-). Pt A and O x 4 and anxious. Pt removing her leads impulsively to get to bathroom. At baseline, pt lives with ex-spouse and other family and uses no AD for independence with household mobility. She was working at [employer] up to 2 weeks ago. Pt reports she lives in the basement and goes upstairs to cook and clean. This date, pt requires min assist for bed mobility and transfers. She ambulated 45 feet with rolling walker with min assist of 1. Pt required mod assist to ambulate without the walker. Patient is a high risk of injurious falls and unsafe to return to prior living environment at this time.  PT will follow pt as she is below her baseline for mobility with generalized weakness, standing balance deficits and decreased functional activity tolerance. PT recommends pt to have OT evaluation as well- hospitalist will be notified. PT recommendation at this time is Skilled Nursing Facility pending pt progress.  -BR --      Row Name 01/09/25 1429          Therapy Assessment/Plan (PT)    Rehab Potential (PT) good  -BR     Criteria for Skilled Interventions Met (PT) yes;meets criteria;skilled treatment is necessary  -BR     Therapy Frequency (PT) 5 times/wk  -BR     Predicted Duration of Therapy Intervention (PT) until D/c  -BR       Row Name 01/09/25 1429          Vital Signs    Pre Systolic BP Rehab 144  -BR     Pre Treatment Diastolic BP 72  -BR     Pretreatment Heart Rate (beats/min) 67  -BR      Pre SpO2 (%) 97  -BR     O2 Delivery Pre Treatment nasal cannula  2L  -BR     O2 Delivery Intra Treatment room air  -BR     Post SpO2 (%) 97  -BR     O2 Delivery Post Treatment nasal cannula  2L  -BR     Pre Patient Position Supine  -BR     Intra Patient Position Standing  -BR     Post Patient Position Sitting  -BR       Row Name 01/09/25 1429          Positioning and Restraints    Pre-Treatment Position in bed  -BR     Post Treatment Position chair  -BR     In Chair notified nsg;reclined;call light within reach;encouraged to call for assist;exit alarm on;legs elevated  -BR               User Key  (r) = Recorded By, (t) = Taken By, (c) = Cosigned By      Initials Name Provider Type    Saira Perez PT Physical Therapist                   Outcome Measures       Row Name 01/09/25 1444          How much help from another person do you currently need...    Turning from your back to your side while in flat bed without using bedrails? 3  -BR     Moving from lying on back to sitting on the side of a flat bed without bedrails? 2  -BR     Moving to and from a bed to a chair (including a wheelchair)? 3  -BR     Standing up from a chair using your arms (e.g., wheelchair, bedside chair)? 3  -BR     Climbing 3-5 steps with a railing? 1  -BR     To walk in hospital room? 3  -BR     AM-PAC 6 Clicks Score (PT) 15  -BR     Highest Level of Mobility Goal 4 --> Transfer to chair/commode  -BR       Row Name 01/09/25 1444          Functional Assessment    Outcome Measure Options AM-PAC 6 Clicks Basic Mobility (PT)  -BR               User Key  (r) = Recorded By, (t) = Taken By, (c) = Cosigned By      Initials Name Provider Type    Saira Perez PT Physical Therapist                                 Physical Therapy Education       Title: PT OT SLP Therapies (Done)       Topic: Physical Therapy (Done)       Point: Mobility training (Done)       Learning Progress Summary            Patient Acceptance, E,D, SATISH,DU by RALEIGH at  1/9/2025 1445                      Point: Body mechanics (Done)       Learning Progress Summary            Patient Acceptance, E,D, VU,DU by BR at 1/9/2025 1445                      Point: Precautions (Done)       Learning Progress Summary            Patient Acceptance, E,D, VU,DU by BR at 1/9/2025 1445                                      User Key       Initials Effective Dates Name Provider Type Discipline    RALEIGH 02/01/22 -  Saira Vasquez, PT Physical Therapist PT                  PT Recommendation and Plan  Planned Therapy Interventions (PT): balance training, bed mobility training, gait training, patient/family education, transfer training, neuromuscular re-education, ROM (range of motion), stair training, strengthening  Outcome Evaluation: Pt presents as a 65 y/o f admitted to [facility] on 1/5/25 with diarrhea, nausea and generalized weakness. Pt has JOSE and thrombocytopenia and bacteremia. Pt has hx metastatic breast cancer diagnosed 2017 with bone mets diagnosed in 2021, history of bilateral mastectomy, HTN, OA, pulmonary hypertension, tetralogy of fallot, DM, OP, disorder of spine, pacemaker. Pt had a fall 2 days ago and she hit her face. CT head (-). CT C-spine (-). CT facial bones (-). CXR (-). Pt A and O x 4 and anxious. Pt removing her leads impulsively to get to bathroom. At baseline, pt lives with ex-spouse and other family and uses no AD for independence with household mobility. She was working at [employer] up to 2 weeks ago. Pt reports she lives in the basement and goes upstairs to cook and clean. This date, pt requires min assist for bed mobility and transfers. She ambulated 45 feet with rolling walker with min assist of 1. Pt required mod assist to ambulate without the walker. Patient is a high risk of injurious falls and unsafe to return to prior living environment at this time.  PT will follow pt as she is below her baseline for mobility with generalized weakness, standing balance deficits and  decreased functional activity tolerance. PT recommends pt to have OT evaluation as well- hospitalist will be notified. PT recommendation at this time is Skilled Nursing Facility pending pt progress.     Time Calculation:         PT Charges       Row Name 01/09/25 1445             Time Calculation    Start Time 1350  -BR      Stop Time 1430  -BR      Time Calculation (min) 40 min  -BR      PT Received On 01/09/25  -BR      PT - Next Appointment 01/10/25  -BR      PT Goal Re-Cert Due Date 01/23/25  -BR         Time Calculation- PT    Total Timed Code Minutes- PT 15 minute(s)  -BR                User Key  (r) = Recorded By, (t) = Taken By, (c) = Cosigned By      Initials Name Provider Type    BR Saira Vasquez, PT Physical Therapist                  Therapy Charges for Today       Code Description Service Date Service Provider Modifiers Qty    10624572646 HC PT EVAL MOD COMPLEXITY 4 1/9/2025 Saira Vasquez, PT GP 1    13346126679 HC GAIT TRAINING EA 15 MIN 1/9/2025 Saira Vasquez, PT GP 1            PT G-Codes  Outcome Measure Options: AM-PAC 6 Clicks Basic Mobility (PT)  AM-PAC 6 Clicks Score (PT): 15  PT Discharge Summary  Anticipated Discharge Disposition (PT): skilled nursing facility    Saira Vasquez PT  1/9/2025

## 2025-01-09 NOTE — PROGRESS NOTES
[facility] MEDICINE SERVICE  DAILY PROGRESS NOTE    NAME: Gladys Garrison  : 1960  MRN: [id number]      LOS: 3 days     PROVIDER OF SERVICE: Bautista Chapman MD    Chief Complaint: JOSE (acute kidney injury)    Subjective:     Interval History: Patient still with some generalized weakness and pain although her abdominal pain and diarrhea are improved.  Her p.o. intake remains fairly poor.    Review of Systems:   Review of Systems    Objective:     Vital Signs  Temp:  [97.4 °F (36.3 °C)-98.1 °F (36.7 °C)] 97.6 °F (36.4 °C)  Heart Rate:  [60-83] 60  Resp:  [12-17] 17  BP: ()/(61-81) 173/68   Body mass index is 19.92 kg/m².    Physical Exam  General Appearance:  Alert, cooperative, no distress, appears stated age  Head:  Normocephalic, without obvious abnormality, atraumatic  Eyes:  PERRL, conjunctiva/corneas clear, EOM's intact, fundi benign, both eyes  Ears:  Normal TM's and external ear canals, both ears  Nose: Nares normal, septum midline, mucosa normal, no drainage or sinus tenderness  Throat: Lips, mucosa, and tongue normal; teeth and gums normal  Neck: Supple, symmetrical, trachea midline, no adenopathy, thyroid: not enlarged, symmetric, no tenderness/mass/nodules, no carotid bruit or JVD  Lungs:   Clear to auscultation bilaterally, respirations unlabored  Heart:  Regular rate and rhythm, S1, S2 normal, no murmur, rub or gallop  Abdomen:  Soft, mild diffuse TTP, bowel sounds active all four quadrants,  no masses, no organomegaly  Extremities: Extremities normal, atraumatic, no cyanosis or edema  Pulses: 2+ and symmetric  Skin: Skin color, texture, turgor normal, no rashes or lesions  Neurologic: Normal        Scheduled Meds   cefTRIAXone, 2,000 mg, Intravenous, Q24H  heparin (porcine), 5,000 Units, Subcutaneous, Q8H  insulin lispro, 2-7 Units, Subcutaneous, 4x Daily AC & at Bedtime  pantoprazole, 40 mg, Oral, Q AM  sodium chloride, 10 mL, Intravenous, Q12H  sodium chloride, 10 mL, Intravenous,  Q12H       PRN Meds     Calcium Replacement - Follow Nurse / BPA Driven Protocol    dextrose    dextrose    glucagon (human recombinant)    Magnesium Standard Dose Replacement - Follow Nurse / BPA Driven Protocol    nitroglycerin    ondansetron    Phosphorus Replacement - Follow Nurse / BPA Driven Protocol    Potassium Replacement - Follow Nurse / BPA Driven Protocol    sodium chloride    sodium chloride    sodium chloride    sodium chloride    sodium chloride   Infusions           Diagnostic Data    Results from last 7 days   Lab Units 01/09/25  1212 01/08/25  1514 01/08/25  0401   WBC 10*3/mm3  --   --  3.80   HEMOGLOBIN g/dL  --   --  10.8*   HEMATOCRIT %  --   --  33.7*   PLATELETS 10*3/mm3  --   --  43*   GLUCOSE mg/dL 107*  --  113*   CREATININE mg/dL 0.74  --  0.86   BUN mg/dL 23  --  39*   SODIUM mmol/L 137  --  134*   POTASSIUM mmol/L 4.0   < > 3.6   AST (SGOT) U/L  --   --  33*   ALT (SGPT) U/L  --   --  14   ALK PHOS U/L  --   --  52   BILIRUBIN mg/dL  --   --  0.5   ANION GAP mmol/L 8.6  --  7.3    < > = values in this interval not displayed.       No radiology results for the last day      I reviewed the patient's new clinical results.    Assessment/Plan:   Gladys Garrison is a 64 y.o. female with a PMH of breast cancer with metastasis to bone, complex cardiac medical history including tetralogy of Fallot, coarctation of aorta, VSD status post repair, coarctation of aorta S/P stent who presented to [facility] on 1/5/2025 with diarrhea since around last Tuesday it is dark color, associated with nausea and multiple episodes of vomiting. States she has not been able to keep anything down, having generalized abdominal cramps, subjective fever and chills, generalized weakness. States she was treated for a UTI last month. States she has not urinated at all in past 3-4 days. Denies hematuria or dysuria, no flank pain. She sustained a fall 2 days ago and hit her head and face, denies LOC. Workup in  ER chemistry labs show creat 2.05, K 3.4, Na 135, albumin 3.2, T bili 1.4. CBC labs Hb 12.6, wbc 11.4, platelets 52. ED course notable for hypotension that slightly improved with IVF, temp 100.1. Saturating well on room air. The decision to admit was made.     Hypotension due to volume depletion  Diarrheal illness  Oliguric JOSE due to hypovolemia on the basis of diarrhea and vomiting, NSAIDS use  Melena by history  -Patient was initiated on IV fluids with improvement in renal function back to baseline  -Patient states her diarrhea has improved although her p.o. intake is still somewhat poor  -Will add nutritional supplement with meals  -Continue IV antibiotics with ceftriaxone for bacteremia but discontinued Flagyl   -Holding all nephrotoxic medications including losartan given JOSE  -Advanced to GI soft diet    Group B strep bacteremia with ICD lead vegetation  -Unclear source although this could be related to UTI  -Urine culture with 50,000 CFU GBS  -Repeat blood cultures pending  -ID consult appreciated  -Continue ceftriaxone  -GREGORY confirmed the vegetation  -Not quite a candidate for lead extraction given that patient is complete dependent on this device  -Plan for a 6-week course of IV antibiotics; PICC line placement    Possible UTI  -UA on admission suggestive of possible infection  -Continue ceftriaxone as above        History breast cancer with bone metastasis  Thrombocytopenia  -Was on Arimidex in the past was discontinued due to osteoporosis  -Had intolerance to tamoxifen  -She is currently on Abemeciclib outpatient, this has been considered to have alternate therapies given frequent UTIs as per pharmacy note on 12/10  -Will consult oncology given her thrombocytopenia which is likely related to her chemotherapy  -Monitor daily CBCs, monitor platelets  -Continue Norco  mg q 4 PRN for moderate pain  -Continue with intrathecal pump          VTE Prophylaxis:  Pharmacologic VTE prophylaxis orders are  present.         Code status is   Code Status and Medical Interventions: CPR (Attempt to Resuscitate); Full Support   Ordered at: 01/07/25 1310     Code Status (Patient has no pulse and is not breathing):    CPR (Attempt to Resuscitate)     Medical Interventions (Patient has pulse or is breathing):    Full Support       Plan for disposition: Likely to SNF on 1/13    Time: 30 minutes    Part of this note may be an electronic transcription/translation of spoken language to printed text using the Dragon Dictation System.    Signature: Electronically signed by Bautista Chapman MD, 01/09/25, 14:48 EST.  [facility]ist Team

## 2025-01-09 NOTE — CASE MANAGEMENT/SOCIAL WORK
Continued Stay Note  MICHAEL Causey     Patient Name: Gladys Garrison  MRN: [id number]  Today's Date: 1/9/2025    Admit Date: 1/5/2025    Plan: DC PLAN: Routine home. [facility] for home IV abx. Ambulatory care for labs and line care.       Discharge Plan       Row Name 01/09/25 1612       Plan    Plan DC PLAN: Routine home. [facility] for home IV abx. Ambulatory care for labs and line care.        Patient/Family in Agreement with Plan yes    Plan Comments Reached out to Crystal with [facility], following patient and IV abx will be $33.00 per week. Called ambulatory care and spoke with Dedra to update that patient will need labs and line care. Verbalized understanding. Will call patient to schedule appointment.                      Expected Discharge Date and Time       Expected Discharge Date Expected Discharge Time    Jan 9, 2025           Maribell Dunbar RN   Case Management  [phone]

## 2025-01-09 NOTE — PROGRESS NOTES
"                                                                                                            [facility]/ [facility]                                                                     Nephrology Progress Note                                                                                LOS: 3 days     Chief Complaint/ Reason for encounter: no    Subjective   01/09/25 : Patient denies for any new complaint, no nausea no vomiting no abdominal pain.      Medical history reviewed:  History of Present Illness    Subjective    History taken from: Patient and chart    Vital Signs  Temp:  [97.4 °F (36.3 °C)-98.1 °F (36.7 °C)] 97.4 °F (36.3 °C)  Heart Rate:  [60-83] 67  Resp:  [12-17] 15  BP: ()/(62-81) 144/72       Wt Readings from Last 1 Encounters:   01/07/25 1153 46.3 kg (102 lb)   01/05/25 2351 46.3 kg (102 lb)       Objective:  Vital signs: (most recent): Blood pressure 144/72, pulse 67, temperature 97.4 °F (36.3 °C), temperature source Oral, resp. rate 15, height 152.4 cm (60\"), weight 46.3 kg (102 lb), SpO2 98%, not currently breastfeeding.                Objective:  General Appearance:  Comfortable, -appearing, in no acute distress and not in pain.  Awake, alert  HEENT: Mucous membranes moist, no injury, oropharynx clear  Lungs:  Normal effort and normal respiratory rate.  Breath sounds clear to auscultation.  No  respiratory distress.  No rales, decreased breath sounds or rhonchi.    Heart: Normal rate.  Regular rhythm.  S1, S2 normal.  No murmur.   Abdomen: Abdomen is soft.  Bowel sounds are normal, no abdominal tenderness.  There is no rebound or guarding  Extremities:  no edema of bilateral lower extremities  Skin:  Warm and dry with no rashes      Results Review:    Intake/Output:     Intake/Output Summary (Last 24 hours) at 1/9/2025 0855  Last data filed at 1/9/2025 0723  Gross per 24 hour   Intake 240 ml   Output --   Net 240 ml         DATA:  Radiology and Labs:  The following " labs independently reviewed by me. Additional labs ordered for tomorrow a.m.  Interval notes, chart personally reviewed by me.   Old records independently reviewed showing   The following radiologic studies independently viewed by me, findings   New problems include  Discussed with     Risk/ complexity of medical care/ medical decision making Moderate.    Labs:   Recent Results (from the past 24 hours)   POC Glucose Once    Collection Time: 01/08/25 11:23 AM    Specimen: Blood   Result Value Ref Range    Glucose 148 (H) 70 - 105 mg/dL   Potassium    Collection Time: 01/08/25  3:14 PM    Specimen: Blood   Result Value Ref Range    Potassium 4.0 3.5 - 5.2 mmol/L   Phosphorus    Collection Time: 01/08/25  3:14 PM    Specimen: Blood   Result Value Ref Range    Phosphorus 2.4 (L) 2.5 - 4.5 mg/dL   POC Glucose Once    Collection Time: 01/08/25  4:49 PM    Specimen: Blood   Result Value Ref Range    Glucose 129 (H) 70 - 105 mg/dL   POC Glucose Once    Collection Time: 01/08/25  8:44 PM    Specimen: Blood   Result Value Ref Range    Glucose 129 (H) 70 - 105 mg/dL   POC Glucose 4x Daily Before Meals & at Bedtime    Collection Time: 01/09/25  8:24 AM    Specimen: Blood   Result Value Ref Range    Glucose 99 70 - 105 mg/dL       Radiology:  Pertinent radiology studies were reviewed as described above      Medications have been reviewed separately in chart overview      ASSESSMENT / PLAN    Acute kidney injury improving already secondary to intravascular volume depletion. UA revealed protein, ketones, wbc and very few RBCs.   Metabolic acidosis related to GI losses.      Plan  Repeat UA in next few days  UPCR 429 ... Will repeat in outpatient setting.  Urine and bloodculture positive for Streptococci agalactiae  US renal no hydro.  Avoid NSAIDs, Avoid Nephrotoxic medication.  LR 60 ml per hour ... Ok to d/c once pt is not NPO and tolerated oral.  Awaited BMP for today.     Hypokalemia replace as needed.    Hypophosphatemia been  replaced.     Nausea / vomiting / diarrhea      Bacteremia as per ID/ primary service. Plan for 2 d echo / GREGORY noted.     Ca breast  on Abemeciclib as per oncology service.  Thrombocytopenia       Angelica Rodriguez MD  [facility]  Office phone number: [phone]  Answering service phone number: [phone]    01/09/25  08:59 EST    Dictation performed using Dragon dictation software

## 2025-01-10 PROBLEM — T82.7XXA PACEMAKER INFECTION: Status: ACTIVE | Noted: 2025-01-10

## 2025-01-10 PROBLEM — R78.81 BACTEREMIA: Status: ACTIVE | Noted: 2025-01-10

## 2025-01-10 LAB
ANION GAP SERPL CALCULATED.3IONS-SCNC: 8.2 MMOL/L (ref 5–15)
BASOPHILS # BLD AUTO: 0.01 10*3/MM3 (ref 0–0.2)
BASOPHILS NFR BLD AUTO: 0.1 % (ref 0–1.5)
BUN SERPL-MCNC: 18 MG/DL (ref 8–23)
BUN/CREAT SERPL: 29.5 (ref 7–25)
CALCIUM SPEC-SCNC: 8.1 MG/DL (ref 8.6–10.5)
CHLORIDE SERPL-SCNC: 107 MMOL/L (ref 98–107)
CO2 SERPL-SCNC: 19.8 MMOL/L (ref 22–29)
CREAT SERPL-MCNC: 0.61 MG/DL (ref 0.57–1)
DEPRECATED RDW RBC AUTO: 50.4 FL (ref 37–54)
EGFRCR SERPLBLD CKD-EPI 2021: 101.2 ML/MIN/1.73
EOSINOPHIL # BLD AUTO: 0.02 10*3/MM3 (ref 0–0.4)
EOSINOPHIL NFR BLD AUTO: 0.3 % (ref 0.3–6.2)
ERYTHROCYTE [DISTWIDTH] IN BLOOD BY AUTOMATED COUNT: 14.8 % (ref 12.3–15.4)
GLUCOSE BLDC GLUCOMTR-MCNC: 119 MG/DL (ref 70–105)
GLUCOSE BLDC GLUCOMTR-MCNC: 127 MG/DL (ref 70–105)
GLUCOSE BLDC GLUCOMTR-MCNC: 93 MG/DL (ref 70–105)
GLUCOSE SERPL-MCNC: 118 MG/DL (ref 65–99)
HCT VFR BLD AUTO: 28.8 % (ref 34–46.6)
HGB BLD-MCNC: 9.3 G/DL (ref 12–15.9)
IMM GRANULOCYTES # BLD AUTO: 0.16 10*3/MM3 (ref 0–0.05)
IMM GRANULOCYTES NFR BLD AUTO: 2.1 % (ref 0–0.5)
LYMPHOCYTES # BLD AUTO: 0.28 10*3/MM3 (ref 0.7–3.1)
LYMPHOCYTES NFR BLD AUTO: 3.7 % (ref 19.6–45.3)
MCH RBC QN AUTO: 29.9 PG (ref 26.6–33)
MCHC RBC AUTO-ENTMCNC: 32.3 G/DL (ref 31.5–35.7)
MCV RBC AUTO: 92.6 FL (ref 79–97)
MONOCYTES # BLD AUTO: 0.56 10*3/MM3 (ref 0.1–0.9)
MONOCYTES NFR BLD AUTO: 7.4 % (ref 5–12)
NEUTROPHILS NFR BLD AUTO: 6.51 10*3/MM3 (ref 1.7–7)
NEUTROPHILS NFR BLD AUTO: 86.4 % (ref 42.7–76)
NRBC BLD AUTO-RTO: 0 /100 WBC (ref 0–0.2)
PLATELET # BLD AUTO: 53 10*3/MM3 (ref 140–450)
PMV BLD AUTO: 11.7 FL (ref 6–12)
POTASSIUM SERPL-SCNC: 4.1 MMOL/L (ref 3.5–5.2)
RBC # BLD AUTO: 3.11 10*6/MM3 (ref 3.77–5.28)
SODIUM SERPL-SCNC: 135 MMOL/L (ref 136–145)
WBC NRBC COR # BLD AUTO: 7.54 10*3/MM3 (ref 3.4–10.8)

## 2025-01-10 PROCEDURE — 82948 REAGENT STRIP/BLOOD GLUCOSE: CPT | Performed by: STUDENT IN AN ORGANIZED HEALTH CARE EDUCATION/TRAINING PROGRAM

## 2025-01-10 PROCEDURE — 85025 COMPLETE CBC W/AUTO DIFF WBC: CPT | Performed by: NURSE PRACTITIONER

## 2025-01-10 PROCEDURE — 82948 REAGENT STRIP/BLOOD GLUCOSE: CPT

## 2025-01-10 PROCEDURE — 99232 SBSQ HOSP IP/OBS MODERATE 35: CPT | Performed by: INTERNAL MEDICINE

## 2025-01-10 PROCEDURE — 80048 BASIC METABOLIC PNL TOTAL CA: CPT | Performed by: HOSPITALIST

## 2025-01-10 PROCEDURE — 25010000002 HEPARIN (PORCINE) PER 1000 UNITS: Performed by: STUDENT IN AN ORGANIZED HEALTH CARE EDUCATION/TRAINING PROGRAM

## 2025-01-10 PROCEDURE — 97112 NEUROMUSCULAR REEDUCATION: CPT

## 2025-01-10 PROCEDURE — 97116 GAIT TRAINING THERAPY: CPT

## 2025-01-10 PROCEDURE — 97166 OT EVAL MOD COMPLEX 45 MIN: CPT

## 2025-01-10 PROCEDURE — 25010000002 CEFTRIAXONE PER 250 MG: Performed by: NURSE PRACTITIONER

## 2025-01-10 RX ORDER — SODIUM BICARBONATE 650 MG/1
650 TABLET ORAL 2 TIMES DAILY
Status: DISCONTINUED | OUTPATIENT
Start: 2025-01-10 | End: 2025-01-13 | Stop reason: HOSPADM

## 2025-01-10 RX ORDER — FUROSEMIDE 20 MG/1
20 TABLET ORAL DAILY
Status: DISCONTINUED | OUTPATIENT
Start: 2025-01-10 | End: 2025-01-13 | Stop reason: HOSPADM

## 2025-01-10 RX ADMIN — FUROSEMIDE 20 MG: 20 TABLET ORAL at 14:00

## 2025-01-10 RX ADMIN — HEPARIN SODIUM 5000 UNITS: 5000 INJECTION INTRAVENOUS; SUBCUTANEOUS at 21:01

## 2025-01-10 RX ADMIN — Medication 10 ML: at 21:01

## 2025-01-10 RX ADMIN — SODIUM BICARBONATE 650 MG: 650 TABLET ORAL at 21:01

## 2025-01-10 RX ADMIN — HEPARIN SODIUM 5000 UNITS: 5000 INJECTION INTRAVENOUS; SUBCUTANEOUS at 05:15

## 2025-01-10 RX ADMIN — CEFTRIAXONE 2000 MG: 2 INJECTION, POWDER, FOR SOLUTION INTRAMUSCULAR; INTRAVENOUS at 08:57

## 2025-01-10 RX ADMIN — HEPARIN SODIUM 5000 UNITS: 5000 INJECTION INTRAVENOUS; SUBCUTANEOUS at 14:00

## 2025-01-10 RX ADMIN — SODIUM BICARBONATE 650 MG: 650 TABLET ORAL at 08:58

## 2025-01-10 RX ADMIN — Medication 10 ML: at 21:02

## 2025-01-10 RX ADMIN — Medication 10 ML: at 08:57

## 2025-01-10 NOTE — THERAPY EVALUATION
Patient Name: Gladys Garrison  : 1960    MRN: [id number]                              Today's Date: 1/10/2025       Admit Date: 2025    Visit Dx:     ICD-10-CM ICD-9-CM   1. Sepsis, due to unspecified organism, unspecified whether acute organ dysfunction present  A41.9 038.9     995.91   2. JOSE (acute kidney injury)  N17.9 584.9   3. Acute abdominal pain  R10.9 789.00     338.19   4. Diarrhea of presumed infectious origin  R19.7 009.3   5. Injury of head, initial encounter  S09.90XA 959.01   6. Contusion of face, initial encounter  S00.83XA 920   7. Neck sprain, initial encounter  S13.9XXA 847.0     Patient Active Problem List   Diagnosis    Hyperlipidemia    Hypertensive disorder    Osteoarthritis of multiple joints    Pulmonary hypertension    Pulmonary valve disorder    Malignant tumor of breast    Tetralogy of Fallot    Tricuspid valve regurgitation    Controlled type 2 diabetes mellitus without complication, with long-term current use of insulin    Vitamin D deficiency    Acquired spondylolisthesis of cervical vertebra    Adjacent segment disease with spinal stenosis    Cervical spondylosis with myelopathy    Cervical myelopathy with cervical radiculopathy    Osteoporosis    S/P cervical spinal fusion    Lesion of lumbar spine    Atherosclerosis of coronary artery of native heart without angina pectoris    Rib pain on right side    Memory loss of unknown cause    Malignant neoplasm of female breast    Malignant neoplasm metastatic to bone    Supraventricular tachycardia    Thrombocytopenia    Systolic congestive heart failure    COVID-19    Elevated AST (SGOT)    Hyponatremia    Pacemaker    Sick sinus syndrome    AV block, complete    Cancer associated pain    Dyspnea    Pain in left knee    Abnormal radiographic examination    Acute sinusitis    Allergic rhinitis    Constipation    Diarrhea    Nausea    Disorder of aorta    Dizziness and giddiness    Headache    Lack of energy    Syncope     Pacemaker complications    Fall against object    Complete heart block    Acquired trigger finger of left middle finger    Contusion of left wrist    JOSE (acute kidney injury)     Past Medical History:   Diagnosis Date    Allergic     Bone cancer     METASTATIC BONE; stage 4    Bradycardia     SECONDARY TO ABLATION    Breast cancer 2017    mets to lymph nodes; did not do radiation    Cancer of unknown origin     Compression fx, thoracic spine, open, initial encounter     Coronary artery disease     COVID-19 2021    Diabetes mellitus     Heart disease, unspecified     Hepatitis C     RESOLVED WITH MEDICATION    Hyperlipidemia     Hypertension     Obesity (BMI 30-39.9) 2021    Rib fracture     Per patient    Sleep apnea     no machine    Tachycardia     ATRIAL    Type 2 diabetes mellitus 2017     Past Surgical History:   Procedure Laterality Date    BACK SURGERY      neck X 2    BREAST RECONSTRUCTION Bilateral     CARDIAC ABLATION       atrial tachycardia x 5 ablations     CARDIAC CATHETERIZATION      CARDIAC ELECTROPHYSIOLOGY PROCEDURE N/A 2023    Procedure: Pacemaker RV lead insertion with generator change out. [employer];  Surgeon: Steven Hammond MD;  Location: [facility] CATH INVASIVE LOCATION;  Service: Cardiovascular;  Laterality: N/A;    CARDIAC SURGERY      stent placed in aorta    CARDIAC SURGERY      6 surgeries as baby     CERVICAL FUSION ANTERIOR WITH ARTIFICIAL DISCECTOMY IMPLANTATION N/A 2020    Procedure: C4 VERTEBRECTOMY AND ANTERIOR CERIVCAL DISCECTOMY WITH FUSION OF CERVICAL THREE THROUGH FIVE WITH REMOVAL OF HARDWARE C5-C6;  Surgeon: Mark Kaba MD;  Location: [facility] MAIN OR;  Service: Neurosurgery;  Laterality: N/A;     SECTION      x2    COLONOSCOPY N/A 10/23/2020    Procedure: COLONOSCOPY WITH POLYPECTOMY X6;  Surgeon: Stanley Bourne MD;  Location: [facility] ENDOSCOPY;  Service: Gastroenterology;  Laterality: N/A;  POLYPS, INTERNAL HEMORRHOIDS     ENDOMETRIAL ABLATION      REMOVAL SCAR TISSUE UTERINE    ENDOSCOPY N/A 10/23/2020    Procedure: ESOPHAGOGASTRODUODENOSCOPY WITH BIOPSY X 1 AREA;  Surgeon: Stanley Bourne MD;  Location: [facility] ENDOSCOPY;  Service: Gastroenterology;  Laterality: N/A;  GASTRITIS, ESOPHAGITIS, HIATAL HERNIA    INSERT / REPLACE / REMOVE PACEMAKER      KNEE SURGERY  2000    MASTECTOMY Bilateral     NECK SURGERY      PACEMAKER IMPLANTATION      PAIN PUMP INSERTION/REVISION N/A 04/05/2022    Procedure: PAIN PUMP INSERTION AND INTRATHECAL CATHETER PLACEMENT;  Surgeon: Chuck Hunter MD;  Location: [facility] MAIN OR;  Service: Pain Management;  Laterality: N/A;      General Information       Row Name 01/10/25 1238          OT Time and Intention    Document Type evaluation  -SP     Mode of Treatment occupational therapy  -SP       Row Name 01/10/25 1238          General Information    Patient Profile Reviewed yes  -SP     Prior Level of Function independent:;ADL's;work;driving  -SP     Existing Precautions/Restrictions fall;cardiac  -SP     Barriers to Rehab medically complex  -SP       Row Name 01/10/25 1238          Living Environment    People in Home significant other;other relative(s)  -SP       Row Name 01/10/25 1238          Home Main Entrance    Number of Stairs, Main Entrance one  -SP       Row Name 01/10/25 1238          Stairs Within Home, Primary    Stairs, Within Home, Primary Pt stays in the basement of her home.  -SP     Number of Stairs, Within Home, Primary twelve  -SP       Row Name 01/10/25 1238          Cognition    Orientation Status (Cognition) oriented x 4  -SP       Row Name 01/10/25 1238          Safety Issues/Impairments Affecting Functional Mobility    Impairments Affecting Function (Mobility) endurance/activity tolerance;balance;strength;pain  -SP               User Key  (r) = Recorded By, (t) = Taken By, (c) = Cosigned By      Initials Name Provider Type    SP Servando Sanchez, TELLY Occupational  Therapist                     Mobility/ADL's       Row Name 01/10/25 1239          Bed Mobility    Bed Mobility bed mobility (all) activities  -SP     All Activities, [place] (Bed Mobility) modified independence  -SP     Assistive Device (Bed Mobility) bed rails  -SP       Row Name 01/10/25 1239          Transfers    Transfers sit-stand transfer  -SP       Row Name 01/10/25 1239          Sit-Stand Transfer    Sit-Stand [place] (Transfers) contact guard  -SP     Assistive Device (Sit-Stand Transfers) walker, front-wheeled  -SP       Row Name 01/10/25 1239          Functional Mobility    Patient was able to Ambulate yes  -SP       Row Name 01/10/25 1239          Activities of Daily Living    BADL Assessment/Intervention lower body dressing  -SP       Row Name 01/10/25 1239          Lower Body Dressing Assessment/Training    [place] Level (Lower Body Dressing) don;socks;independent  -SP     Position (Lower Body Dressing) edge of bed sitting;unsupported sitting  -SP               User Key  (r) = Recorded By, (t) = Taken By, (c) = Cosigned By      Initials Name Provider Type    SP Servando Sanchez OT Occupational Therapist                   Obj/Interventions       Row Name 01/10/25 1239          Range of Motion Comprehensive    General Range of Motion bilateral upper extremity ROM WFL  -SP       Row Name 01/10/25 1239          Strength Comprehensive (MMT)    Comment, General Manual Muscle Testing (MMT) Assessment BUE grossly 3-/5  -SP       Row Name 01/10/25 1239          Balance    Balance Assessment sitting dynamic balance;sit to stand dynamic balance;standing dynamic balance  -SP     Dynamic Sitting Balance standby assist  -SP     Position, Sitting Balance unsupported;sitting edge of bed  -SP     Sit to Stand Dynamic Balance contact guard  -SP     Dynamic Standing Balance contact guard  -SP     Position/Device Used, Standing Balance walker, front-wheeled  -SP               User Key  (r) = Recorded By,  (t) = Taken By, (c) = Cosigned By      Initials Name Provider Type    SP Servando Sanchez, OT Occupational Therapist                   Goals/Plan       Row Name 01/10/25 1247          Bathing Goal 1 (OT)    Activity/Device (Bathing Goal 1, OT) bathing skills, all  -SP     [place] Level/Cues Needed (Bathing Goal 1, OT) minimum assist (75% or more patient effort)  -SP     Time Frame (Bathing Goal 1, OT) 2 weeks  -SP     Strategies/Barriers (Bathing Goal 1, OT) until d/c  -SP       Row Name 01/10/25 1247          Dressing Goal 1 (OT)    Activity/Device (Dressing Goal 1, OT) dressing skills, all  -SP     [place]/Cues Needed (Dressing Goal 1, OT) modified independence  -SP     Time Frame (Dressing Goal 1, OT) 2 weeks  -SP     Strategies/Barriers (Dressing Goal 1, OT) until d/c  -SP       Row Name 01/10/25 1247          Toileting Goal 1 (OT)    Activity/Device (Toileting Goal 1, OT) toileting skills, all  -SP     [place] Level/Cues Needed (Toileting Goal 1, OT) modified independence  -SP     Time Frame (Toileting Goal 1, OT) 2 weeks  -SP     Strategies/Barriers (Toileting Goal 1, OT) until d/c  -SP       Row Name 01/10/25 1241          Therapy Assessment/Plan (OT)    Planned Therapy Interventions (OT) activity tolerance training;BADL retraining;IADL retraining;patient/caregiver education/training;strengthening exercise;ROM/therapeutic exercise;occupation/activity based interventions;transfer/mobility retraining  -SP               User Key  (r) = Recorded By, (t) = Taken By, (c) = Cosigned By      Initials Name Provider Type    SP Servando Sanchez, OT Occupational Therapist                   Clinical Impression       Row Name 01/10/25 1240          Pain Assessment    Pain Side/Orientation generalized  -SP     Additional Documentation Pain Scale: FACES Pre/Post-Treatment (Group)  -SP       Row Name 01/10/25 1240          Pain Scale: FACES Pre/Post-Treatment    Pain: FACES Scale, Pretreatment 6-->hurts even more  " -SP     Posttreatment Pain Rating 6-->hurts even more  -SP       Row Name 01/10/25 1240          Plan of Care Review    Plan of Care Reviewed With patient  -SP     Outcome Evaluation Pt is a 63 y/o female admitted to [facility] on 1/5/25 with c/o diarrhea with associated n/v, generalized weakness and fall. Pt has JOSE and thrombocytopenia and bacteremia. CT head (-). CT C-spine (-). CT facial bones (-). CXR (-). PMHx significant for breast cancer with metastasis to bone, complex cardiac medical history including tetralogy of Fallot, coarctation of aorta, VSD status post repair, coarctation of aorta S/P stent. At baseline, pt resides with ex , ALEX, and nephew. Pt resides in basement with x1 GILA. Pt reports she is IND to complete ADLs, actively working, and driving. Upon assessment, pt A&O x4 and reports pain \"all over\"/generalized. Pt completed bed mobility with mod I using bed rails and sat EOB with SBA. Pt exhibits global weakness MMT 3-/5 and lethargic. Pt comes to standing with CGA and FWW where she was able to ambulate short distances, though desats on RA, though poor pleth, rebounds to 100% when resting with no s/s of SOA. Pt is significantly below her baseline and would benefit from continued skilled OT services for increased activity tolerance, strength, and endurance in preparation for ADLs. OT recommending SNF; pending progress.  -SP       Row Name 01/10/25 1240          Therapy Assessment/Plan (OT)    Criteria for Skilled Therapeutic Interventions Met (OT) yes;meets criteria;skilled treatment is necessary  -SP     Therapy Frequency (OT) 3 times/wk  -SP     Predicted Duration of Therapy Intervention (OT) until d/c  -SP       Row Name 01/10/25 1240          Therapy Plan Review/Discharge Plan (OT)    Anticipated Discharge Disposition (OT) skilled nursing facility  -SP       Row Name 01/10/25 1240          Vital Signs    Pre SpO2 (%) 100  -SP     O2 Delivery Pre Treatment room air  -SP     Intra SpO2 (%) 96  " -SP     O2 Delivery Intra Treatment room air  -SP     Post SpO2 (%) 100  -SP     O2 Delivery Post Treatment room air  -SP     Pre Patient Position Supine  -SP     Intra Patient Position Standing  -SP     Post Patient Position Supine  -SP       Row Name 01/10/25 1240          Positioning and Restraints    Pre-Treatment Position in bed  -SP     Post Treatment Position bed  -SP     In Bed notified nsg;fowlers;call light within reach;encouraged to call for assist;with family/caregiver  -SP               User Key  (r) = Recorded By, (t) = Taken By, (c) = Cosigned By      Initials Name Provider Type    SP Servando Sanchez OT Occupational Therapist                   Outcome Measures       Row Name 01/10/25 1251          How much help from another is currently needed...    Putting on and taking off regular lower body clothing? 3  -SP     Bathing (including washing, rinsing, and drying) 3  -SP     Toileting (which includes using toilet bed pan or urinal) 4  -SP     Putting on and taking off regular upper body clothing 4  -SP     Taking care of personal grooming (such as brushing teeth) 4  -SP     Eating meals 4  -SP     AM-PAC 6 Clicks Score (OT) 22  -SP       Row Name 01/10/25 0400          How much help from another person do you currently need...    Turning from your back to your side while in flat bed without using bedrails? 3  -OA     Moving from lying on back to sitting on the side of a flat bed without bedrails? 3  -OA     Moving to and from a bed to a chair (including a wheelchair)? 3  -OA     Standing up from a chair using your arms (e.g., wheelchair, bedside chair)? 3  -OA     Climbing 3-5 steps with a railing? 1  -OA     To walk in hospital room? 3  -OA     AM-PAC 6 Clicks Score (PT) 16  -OA     Highest Level of Mobility Goal 5 --> Static standing  -OA       Row Name 01/10/25 1251          Functional Assessment    Outcome Measure Options AM-PAC 6 Clicks Daily Activity (OT)  -SP               User Key  (r) =  Recorded By, (t) = Taken By, (c) = Cosigned By      Initials Name Provider Type    SP Servando Sanchez OT Occupational Therapist    Lele Caro LPN Licensed Nurse                    Occupational Therapy Education       Title: PT OT SLP Therapies (In Progress)       Topic: Occupational Therapy (In Progress)       Point: ADL training (Done)       Description:   Instruct learner(s) on proper safety adaptation and remediation techniques during self care or transfers.   Instruct in proper use of assistive devices.                  Learning Progress Summary            Patient Acceptance, E,TB, VU by SP at 1/10/2025 1251                      Point: Home exercise program (Not Started)       Description:   Instruct learner(s) on appropriate technique for monitoring, assisting and/or progressing therapeutic exercises/activities.                  Learner Progress:  Not documented in this visit.              Point: Precautions (Done)       Description:   Instruct learner(s) on prescribed precautions during self-care and functional transfers.                  Learning Progress Summary            Patient Acceptance, E,TB, VU by SP at 1/10/2025 1251                      Point: Body mechanics (Done)       Description:   Instruct learner(s) on proper positioning and spine alignment during self-care, functional mobility activities and/or exercises.                  Learning Progress Summary            Patient Acceptance, E,TB, VU by SP at 1/10/2025 1251                                      User Key       Initials Effective Dates Name Provider Type Discipline    GUY 11/15/23 -  Servando Sanchez OT Occupational Therapist OT                  OT Recommendation and Plan  Planned Therapy Interventions (OT): activity tolerance training, BADL retraining, IADL retraining, patient/caregiver education/training, strengthening exercise, ROM/therapeutic exercise, occupation/activity based interventions, transfer/mobility retraining  Therapy  "Frequency (OT): 3 times/wk  Plan of Care Review  Plan of Care Reviewed With: patient  Outcome Evaluation: Pt is a 65 y/o female admitted to [facility] on 1/5/25 with c/o diarrhea with associated n/v, generalized weakness and fall. Pt has JOSE and thrombocytopenia and bacteremia. CT head (-). CT C-spine (-). CT facial bones (-). CXR (-). PMHx significant for breast cancer with metastasis to bone, complex cardiac medical history including tetralogy of Fallot, coarctation of aorta, VSD status post repair, coarctation of aorta S/P stent. At baseline, pt resides with ex , ALEX, and nephew. Pt resides in basement with x1 GILA. Pt reports she is IND to complete ADLs, actively working, and driving. Upon assessment, pt A&O x4 and reports pain \"all over\"/generalized. Pt completed bed mobility with mod I using bed rails and sat EOB with SBA. Pt exhibits global weakness MMT 3-/5 and lethargic. Pt comes to standing with CGA and FWW where she was able to ambulate short distances, though desats on RA, though poor pleth, rebounds to 100% when resting with no s/s of SOA. Pt is significantly below her baseline and would benefit from continued skilled OT services for increased activity tolerance, strength, and endurance in preparation for ADLs. OT recommending SNF; pending progress.     Time Calculation:         Time Calculation- OT       Row Name 01/10/25 1251             Time Calculation- OT    OT Start Time 1055  -SP      OT Stop Time 1118  -SP      OT Time Calculation (min) 23 min  -SP      OT Received On 01/10/25  -SP      OT - Next Appointment 01/13/25  -SP      OT Goal Re-Cert Due Date 01/24/25  -SP                User Key  (r) = Recorded By, (t) = Taken By, (c) = Cosigned By      Initials Name Provider Type    Servando Escalera OT Occupational Therapist                  Therapy Charges for Today       Code Description Service Date Service Provider Modifiers Qty    [id number]  OT EVAL MOD COMPLEXITY 4 1/10/2025 Daniel, " Servando, OT GO 1                 Servando Sanchez, OT  1/10/2025

## 2025-01-10 NOTE — PROGRESS NOTES
[facility] MEDICINE SERVICE  DAILY PROGRESS NOTE    NAME: Gladys Garrison  : 1960  MRN: [id number]      LOS: 4 days     PROVIDER OF SERVICE: Bautista Chapman MD    Chief Complaint: JOSE (acute kidney injury)    Subjective:     Interval History: Patient states her abdominal pain is better although she still feels generally weak.  She did work with PT today and felt stronger.  She did have some dyspnea with exertion however.    Review of Systems:   Review of Systems    Objective:     Vital Signs  Temp:  [97.6 °F (36.4 °C)-98.7 °F (37.1 °C)] 97.8 °F (36.6 °C)  Heart Rate:  [60-82] 82  Resp:  [16-18] 18  BP: (107-134)/(45-57) 119/53   Body mass index is 19.92 kg/m².    Physical Exam  General Appearance:  Alert, cooperative, no distress, appears stated age  Head:  Normocephalic, without obvious abnormality, atraumatic  Eyes:  PERRL, conjunctiva/corneas clear, EOM's intact, fundi benign, both eyes  Ears:  Normal TM's and external ear canals, both ears  Nose: Nares normal, septum midline, mucosa normal, no drainage or sinus tenderness  Throat: Lips, mucosa, and tongue normal; teeth and gums normal  Neck: Supple, symmetrical, trachea midline, no adenopathy, thyroid: not enlarged, symmetric, no tenderness/mass/nodules, no carotid bruit or JVD  Lungs:   Clear to auscultation bilaterally, respirations unlabored  Heart:  Regular rate and rhythm, S1, S2 normal, no murmur, rub or gallop  Abdomen:  Soft, mild diffuse TTP, bowel sounds active all four quadrants,  no masses, no organomegaly  Extremities: Extremities normal, atraumatic, no cyanosis or edema  Pulses: 2+ and symmetric  Skin: Skin color, texture, turgor normal, no rashes or lesions  Neurologic: Normal        Scheduled Meds   cefTRIAXone, 2,000 mg, Intravenous, Q24H  furosemide, 20 mg, Oral, Daily  heparin (porcine), 5,000 Units, Subcutaneous, Q8H  insulin lispro, 2-7 Units, Subcutaneous, 4x Daily AC & at Bedtime  sodium bicarbonate, 650 mg, Oral,  BID  sodium chloride, 10 mL, Intravenous, Q12H  sodium chloride, 10 mL, Intravenous, Q12H       PRN Meds     Calcium Replacement - Follow Nurse / BPA Driven Protocol    dextrose    dextrose    glucagon (human recombinant)    Magnesium Standard Dose Replacement - Follow Nurse / BPA Driven Protocol    nitroglycerin    ondansetron    Phosphorus Replacement - Follow Nurse / BPA Driven Protocol    Potassium Replacement - Follow Nurse / BPA Driven Protocol    sodium chloride    sodium chloride    sodium chloride    sodium chloride    sodium chloride   Infusions           Diagnostic Data    Results from last 7 days   Lab Units 01/10/25  0950 01/10/25  0606 01/08/25  1514 01/08/25  0401   WBC 10*3/mm3 7.54  --   --  3.80   HEMOGLOBIN g/dL 9.3*  --   --  10.8*   HEMATOCRIT % 28.8*  --   --  33.7*   PLATELETS 10*3/mm3 53*  --   --  43*   GLUCOSE mg/dL  --  118*   < > 113*   CREATININE mg/dL  --  0.61   < > 0.86   BUN mg/dL  --  18   < > 39*   SODIUM mmol/L  --  135*   < > 134*   POTASSIUM mmol/L  --  4.1   < > 3.6   AST (SGOT) U/L  --   --   --  33*   ALT (SGPT) U/L  --   --   --  14   ALK PHOS U/L  --   --   --  52   BILIRUBIN mg/dL  --   --   --  0.5   ANION GAP mmol/L  --  8.2   < > 7.3    < > = values in this interval not displayed.       No radiology results for the last day      I reviewed the patient's new clinical results.    Assessment/Plan:   Gladys Garrison is a 64 y.o. female with a PMH of breast cancer with metastasis to bone, complex cardiac medical history including tetralogy of Fallot, coarctation of aorta, VSD status post repair, coarctation of aorta S/P stent who presented to [facility] on 1/5/[facility] with diarrhea since around last Tuesday it is dark color, associated with nausea and multiple episodes of vomiting. States she has not been able to keep anything down, having generalized abdominal cramps, subjective fever and chills, generalized weakness. States she was treated for a UTI last month.  States she has not urinated at all in past 3-4 days. Denies hematuria or dysuria, no flank pain. She sustained a fall 2 days ago and hit her head and face, denies LOC. Workup in ER chemistry labs show creat 2.05, K 3.4, Na 135, albumin 3.2, T bili 1.4. CBC labs Hb 12.6, wbc 11.4, platelets 52. ED course notable for hypotension that slightly improved with IVF, temp 100.1. Saturating well on room air. The decision to admit was made.     Hypotension due to volume depletion  Diarrheal illness  Oliguric JOSE due to hypovolemia on the basis of diarrhea and vomiting, NSAIDS use  Melena by history  -Patient was initiated on IV fluids with improvement in renal function back to baseline  -Patient states her diarrhea has improved although her p.o. intake is still somewhat poor  -Added nutritional supplement with meals  -Continue IV antibiotics with ceftriaxone for bacteremia but discontinued Flagyl   -Holding all nephrotoxic medications including losartan given JOSE  -Advanced to GI soft diet    Group B strep bacteremia with ICD lead vegetation  -Unclear source although this could be related to UTI  -Urine culture with 50,000 CFU GBS  -Repeat blood cultures from 1/8 with no growth  -ID consult appreciated  -Continue ceftriaxone  -GREGORY confirmed the vegetation  -Not a candidate for lead extraction given that patient is complete dependent on this device  -Plan for a 6-week course of IV antibiotics; PICC line placement    Possible UTI  -UA on admission suggestive of possible infection  -Continue ceftriaxone as above        History breast cancer with bone metastasis  Thrombocytopenia  -Was on Arimidex in the past was discontinued due to osteoporosis  -Had intolerance to tamoxifen  -She is currently on Abemeciclib outpatient, but this is being held during treatment for her acute infectious process  -Appreciate oncology consult  -Monitor daily CBCs, monitor platelets  -Continue Norco  mg q 4 PRN for moderate pain  -Continue with  intrathecal pain pump          VTE Prophylaxis:  Pharmacologic VTE prophylaxis orders are present.         Code status is   Code Status and Medical Interventions: CPR (Attempt to Resuscitate); Full Support   Ordered at: 01/07/25 1310     Code Status (Patient has no pulse and is not breathing):    CPR (Attempt to Resuscitate)     Medical Interventions (Patient has pulse or is breathing):    Full Support       Plan for disposition: Likely to SNF on 1/13    Time: 30 minutes    Part of this note may be an electronic transcription/translation of spoken language to printed text using the Dragon Dictation System.    Signature: Electronically signed by Bautista Chapman MD, 01/10/25, 14:43 EST.  [facility] Hospitalist Team

## 2025-01-10 NOTE — PLAN OF CARE
"Goal Outcome Evaluation:  Plan of Care Reviewed With: patient   Assessment: Gladys Garrison presents with functional mobility impairments which indicate the need for skilled intervention. Pt has infection in her blood and her per RN her care is medically complex. Pt needs cues for safety and to stay centered in her rolling walker. Pt has generalized weakness and she is below her baseline for all mobility. Tolerating session today without incident. Will continue to follow and progress as tolerated.     Plan/Recommendations:   If medically appropriate, Moderate Intensity Therapy recommended post-acute care. This is recommended as therapy feels the patient would require 3-4 days per week and wouldn't tolerate \"3 hour daily\" rehab intensity. SNF would be the preferred choice. If the patient does not agree to SNF, arrange HH or OP depending on home bound status. If patient is medically complex, consider LTACH. Pt requires no DME at discharge.     Pt desires Skilled Rehab placement at discharge. Pt cooperative; agreeable to therapeutic recommendations and plan of care.          Anticipated Discharge Disposition (PT): skilled nursing facility                        "

## 2025-01-10 NOTE — PLAN OF CARE
Problem: Adult Inpatient Plan of Care  Goal: Plan of Care Review  Outcome: Progressing  Flowsheets (Taken 1/9/2025 0218 by Vadim Bronson RN)  Progress: improving  Goal: Patient-Specific Goal (Individualized)  Outcome: Progressing  Goal: Absence of Hospital-Acquired Illness or Injury  Outcome: Progressing  Intervention: Identify and Manage Fall Risk  Recent Flowsheet Documentation  Taken 1/9/2025 2200 by Lele Chowdhury LPN  Safety Promotion/Fall Prevention: activity supervised  Taken 1/9/2025 2000 by Lele Chowdhury LPN  Safety Promotion/Fall Prevention: activity supervised  Intervention: Prevent Skin Injury  Recent Flowsheet Documentation  Taken 1/9/2025 2000 by Lele Chowdhury LPN  Body Position: position changed independently  Skin Protection: incontinence pads utilized  Intervention: Prevent Infection  Recent Flowsheet Documentation  Taken 1/9/2025 2200 by Lele Chowdhury LPN  Infection Prevention: cohorting utilized  Taken 1/9/2025 2000 by Lele Chowdhury LPN  Infection Prevention: cohorting utilized  Goal: Optimal Comfort and Wellbeing  Outcome: Progressing  Intervention: Provide Person-Centered Care  Recent Flowsheet Documentation  Taken 1/9/2025 2000 by Lele Chowdhury LPN  Trust Relationship/Rapport:   care explained   emotional support provided   empathic listening provided   questions answered  Goal: Readiness for Transition of Care  Outcome: Progressing     Problem: Comorbidity Management  Goal: Blood Glucose Level Within Target Range  Outcome: Progressing     Problem: Skin Injury Risk Increased  Goal: Skin Health and Integrity  Outcome: Progressing  Intervention: Optimize Skin Protection  Recent Flowsheet Documentation  Taken 1/9/2025 2000 by Lele Chowdhury LPN  Pressure Reduction Techniques: frequent weight shift encouraged  Head of Bed (HOB) Positioning: HOB elevated  Pressure Reduction Devices: pressure-redistributing mattress utilized  Skin Protection: incontinence pads utilized     Problem:  Fall Injury Risk  Goal: Absence of Fall and Fall-Related Injury  Outcome: Progressing  Intervention: Promote Injury-Free Environment  Recent Flowsheet Documentation  Taken 1/9/2025 2200 by Lele Chowdhury LPN  Safety Promotion/Fall Prevention: activity supervised  Taken 1/9/2025 2000 by Lele Chowdhury LPN  Safety Promotion/Fall Prevention: activity supervised     Problem: Acute Kidney Injury/Impairment  Goal: Fluid and Electrolyte Balance  Outcome: Progressing  Goal: Improved Oral Intake  Outcome: Progressing  Goal: Effective Renal Function  Outcome: Progressing   Goal Outcome Evaluation:         Pt resting comfortably in bed. No complaints. Will continue to monitor..

## 2025-01-10 NOTE — THERAPY TREATMENT NOTE
"Subjective: Pt agreeable to therapeutic plan of care.    Objective:     Precautions - medically complex    Bed mobility - CGA with cues for safety  Transfers - CGA and with rolling walker  Ambulation - 50 feet CGA, Min-A, and with rolling walker    Vitals: WNL on room air    Pain: 5 VAS   Location: generalized  Intervention for pain: Repositioned and Increased Activity    Education: Provided education on the importance of mobility in the acute care setting, Verbal/Tactile Cues, Transfer Training, and Gait Training    Assessment: Gladys Garrison presents with functional mobility impairments which indicate the need for skilled intervention. Pt has infection in her blood and her per RN her care is medically complex. Pt needs cues for safety and to stay centered in her rolling walker. Pt has generalized weakness and she is below her baseline for all mobility. Tolerating session today without incident. Will continue to follow and progress as tolerated.     Plan/Recommendations:   If medically appropriate, Moderate Intensity Therapy recommended post-acute care. This is recommended as therapy feels the patient would require 3-4 days per week and wouldn't tolerate \"3 hour daily\" rehab intensity. SNF would be the preferred choice. If the patient does not agree to SNF, arrange HH or OP depending on home bound status. If patient is medically complex, consider LTACH. Pt requires no DME at discharge.     Pt desires Skilled Rehab placement at discharge. Pt cooperative; agreeable to therapeutic recommendations and plan of care.       Basic Mobility 6-click:  Rollin = Total, A lot = 2, A little = 3; 4 = None  Supine>Sit:   1 = Total, A lot = 2, A little = 3; 4 = None   Sit>Stand with arms:  1 = Total, A lot = 2, A little = 3; 4 = None  Bed>Chair:   1 = Total, A lot = 2, A little = 3; 4 = None  Ambulate in room:  1 = Total, A lot = 2, A little = 3; 4 = None  3-5 Steps with railin = Total, A lot = 2, A little = 3; 4 " = None  Score: 16    Modified Zac: N/A = No pre-op stroke/TIA    Post-Tx Position: Supine with HOB Elevated, Alarms activated, and Call light and personal items within reach  PPE: gloves    Therapy Charges for Today       Code Description Service Date Service Provider Modifiers Qty    22821903447 HC PT EVAL MOD COMPLEXITY 4 1/9/2025 Saira Vasquez, PT GP 1    72298508543 HC GAIT TRAINING EA 15 MIN 1/9/2025 Saira Vasquez, PT GP 1    30839218749 HC GAIT TRAINING EA 15 MIN 1/10/2025 Saira Vasquez, PT GP 1    71352917223 HC PT NEUROMUSC RE EDUCATION EA 15 MIN 1/10/2025 Saira Vasquez, PT GP 1           PT Charges       Row Name 01/10/25 1240             Time Calculation    Start Time 1052  -BR      Stop Time 1118  -BR      Time Calculation (min) 26 min  -BR      PT Received On 01/10/25  -BR      PT - Next Appointment 01/12/25  -BR         Time Calculation- PT    Total Timed Code Minutes- PT 26 minute(s)  -BR                User Key  (r) = Recorded By, (t) = Taken By, (c) = Cosigned By      Initials Name Provider Type    BR Saira Vasquez, PT Physical Therapist

## 2025-01-10 NOTE — PLAN OF CARE
"Goal Outcome Evaluation:  Plan of Care Reviewed With: patient    Outcome Evaluation: Pt is a 65 y/o female admitted to [facility] on 1/5/25 with c/o diarrhea with associated n/v, generalized weakness and fall. Pt has JOSE and thrombocytopenia and bacteremia. CT head (-). CT C-spine (-). CT facial bones (-). CXR (-). PMHx significant for breast cancer with metastasis to bone, complex cardiac medical history including tetralogy of Fallot, coarctation of aorta, VSD status post repair, coarctation of aorta S/P stent. At baseline, pt resides with ex , ALEX, and nephew. Pt resides in basement with x1 GILA. Pt reports she is IND to complete ADLs, actively working, and driving. Upon assessment, pt A&O x4 and reports pain \"all over\"/generalized. Pt completed bed mobility with mod I using bed rails and sat EOB with SBA. Pt exhibits global weakness MMT 3-/5 and lethargic. Pt comes to standing with CGA and FWW where she was able to ambulate short distances, though desats on RA, though poor pleth, rebounds to 100% when resting with no s/s of SOA. Pt is significantly below her baseline and would benefit from continued skilled OT services for increased activity tolerance, strength, and endurance in preparation for ADLs. OT recommending SNF; pending progress.    Anticipated Discharge Disposition (OT): skilled nursing facility                        "

## 2025-01-10 NOTE — DISCHARGE PLACEMENT REQUEST
"Lucien Garrison (64 y.o. Female)       Date of Birth   1960    Social Security Number       Address   [address] [place] [address] [address] [place] [address]    Home Phone   [phone]    MRN   [id number]       Tenriism   None    Marital Status   Legally                             Admission Date   1/5/25    Admission Type   Emergency    Admitting Provider   Llanes Alvarez, Carlos, MD    Attending Provider   Bautista Chapman MD    Department, Room/Bed   [facility] 2D, 271/1       Discharge Date       Discharge Disposition       Discharge Destination                                 Attending Provider: Bautista Chapman MD    Allergies: Oxycodone, Promethazine, Tape, Adhesive Tape, Flexeril [Cyclobenzaprine]    Isolation: None   Infection: None   Code Status: CPR    Ht: 152.4 cm (60\")   Wt: 46.3 kg (102 lb)    Admission Cmt: None   Principal Problem: JOSE (acute kidney injury) [N17.9]                   Active Insurance as of 1/5/2025       Primary Coverage       Payor Plan Insurance Group Employer/Plan Group    [employer] MEDICARE REPLACEMENT [place] [id number]       Payor Plan Address Payor Plan Phone Number Payor Plan Fax Number Effective Dates    [address] [phone]  1/1/2024 - None Entered    [facility] [id number]         Subscriber Name Subscriber Birth Date Member ID       LUCIEN GARRISON 1960 [id number]                     Emergency Contacts        (Rel.) Home Phone Work Phone Mobile Phone    GARRISONANGELLA PHILIP (Spouse) [phone]              "

## 2025-01-10 NOTE — PROGRESS NOTES
Hematology/Oncology Inpatient Progress Note    PATIENT NAME: Gladys Garrison  : 1960  MRN: [id number]    CHIEF COMPLAINT: Nausea, vomiting, diarrhea    HISTORY OF PRESENT ILLNESS:      Gladys Garrison is a 64 y.o. female with past medical history significant for tetralogy of Fallot, coarctation of aorta, VSD status post repair, coarctation of aorta S/P stent who presented to [facility] on 2025 with complaints of generalized weakness.  He reports she has been having diarrhea for several days that is dark in color associated with nausea and multiple episodes of vomiting.  She has generalized abdominal cramps, subjective fever and chills and generalized weakness.  She also reports a fall, no loss of consciousness.  CT imaging of the head and facial bones with no acute findings or significant interval changes.  She was treated for a UTI about 1 month ago.  She also reports she has not urinated in several days.  Workup in ED significant for JOSE.  She was noted to have hypotension that slightly improved with IV fluids, temperature of 100.1 °F.  She was admitted for further evaluation and workup.    2025 CT abdomen pelvis without contrast showed no acute abdominal or pelvic abnormality.  Mild splenomegaly.  Nonobstructing left renal stone.  Layering density in the gallbladder may be stones versus sludge.  Renal ultrasound was completed that was normal with no evidence of obstructive uropathy.    Gastroenterology was consulted and recommending stool studies for C. difficile and gastrointestinal panel.    Urine cultures completed.  Respiratory panel negative.  Preliminary blood cultures with gram-positive cocci in pairs and chains.  Infectious disease has been consulted.    25  Hematology/Oncology was consulted.  She is established with Dr. Nunez for history of metastatic breast cancer currently on exemestane/abemaciclib.     Patient has developed nausea vomiting along with diarrhea.   She denies fevers or chills.  She has been on abemaciclib.  She has not had any bleeding issues.  She still feels weak and ill      Subjective     Patient denies any issues overnight      ROS:    Review of Systems   Constitutional:  Positive for fatigue. Negative for chills and fever.   HENT:  Negative for congestion, drooling, ear discharge, rhinorrhea, sinus pressure and tinnitus.    Eyes:  Negative for photophobia, pain and discharge.   Respiratory:  Negative for apnea, choking and stridor.    Cardiovascular:  Negative for palpitations.   Gastrointestinal:  Negative for abdominal distention, abdominal pain and anal bleeding.   Endocrine: Negative for polydipsia and polyphagia.   Genitourinary:  Negative for decreased urine volume, flank pain and genital sores.   Musculoskeletal:  Negative for gait problem, neck pain and neck stiffness.   Skin:  Negative for color change, rash and wound.   Neurological:  Positive for weakness. Negative for tremors, seizures, syncope, facial asymmetry and speech difficulty.   Hematological:  Negative for adenopathy.   Psychiatric/Behavioral:  Negative for agitation, confusion, hallucinations and self-injury. The patient is not hyperactive.         MEDICATIONS:        Scheduled Meds:  cefTRIAXone, 2,000 mg, Intravenous, Q24H  heparin (porcine), 5,000 Units, Subcutaneous, Q8H  insulin lispro, 2-7 Units, Subcutaneous, 4x Daily AC & at Bedtime  sodium bicarbonate, 650 mg, Oral, BID  sodium chloride, 10 mL, Intravenous, Q12H  sodium chloride, 10 mL, Intravenous, Q12H       Continuous Infusions:      PRN Meds:    Calcium Replacement - Follow Nurse / BPA Driven Protocol    dextrose    dextrose    glucagon (human recombinant)    Magnesium Standard Dose Replacement - Follow Nurse / BPA Driven Protocol    nitroglycerin    ondansetron    Phosphorus Replacement - Follow Nurse / BPA Driven Protocol    Potassium Replacement - Follow Nurse / BPA Driven Protocol    sodium chloride    sodium  "chloride    sodium chloride    sodium chloride    sodium chloride     ALLERGIES:    Allergies   Allergen Reactions    Oxycodone Nausea And Vomiting    Promethazine Other (See Comments)     Hyperactive mean    Tape Other (See Comments)     .blisters      Adhesive Tape Rash    Flexeril [Cyclobenzaprine] Rash       Objective    VITALS:   /56 (BP Location: Left arm, Patient Position: Lying)   Pulse 68   Temp 97.8 °F (36.6 °C) (Oral)   Resp 18   Ht 152.4 cm (60\")   Wt 46.3 kg (102 lb)   LMP  (LMP Unknown)   SpO2 94%   BMI 19.92 kg/m²     PHYSICAL EXAM: (performed by MD)  Physical Exam  Vitals and nursing note reviewed.   Constitutional:       General: She is not in acute distress.     Appearance: She is not diaphoretic.   HENT:      Head: Normocephalic and atraumatic.   Eyes:      General: No scleral icterus.        Right eye: No discharge.         Left eye: No discharge.      Conjunctiva/sclera: Conjunctivae normal.   Neck:      Thyroid: No thyromegaly.   Cardiovascular:      Rate and Rhythm: Normal rate and regular rhythm.      Heart sounds: Normal heart sounds.      No friction rub. No gallop.   Pulmonary:      Effort: Pulmonary effort is normal. No respiratory distress.      Breath sounds: No stridor. No wheezing.   Abdominal:      General: Bowel sounds are normal.      Palpations: Abdomen is soft. There is no mass.      Tenderness: There is no abdominal tenderness. There is no guarding or rebound.   Musculoskeletal:         General: No tenderness. Normal range of motion.      Cervical back: Normal range of motion and neck supple.   Lymphadenopathy:      Cervical: No cervical adenopathy.   Skin:     General: Skin is warm.      Findings: No erythema or rash.   Neurological:      Mental Status: She is alert and oriented to person, place, and time.      Motor: No abnormal muscle tone.   Psychiatric:         Behavior: Behavior normal.      I have reexamined the patient and the results are consistent with " the previously documented exam. Мария Lian Nunez MD       RECENT LABS:    Lab Results (last 24 hours)       Procedure Component Value Units Date/Time    Basic Metabolic Panel [[id number]]  (Abnormal) Collected: 01/10/25 0606    Specimen: Blood, Central Line Updated: 01/10/25 0647     Glucose 118 mg/dL      BUN 18 mg/dL      Creatinine 0.61 mg/dL      Sodium 135 mmol/L      Potassium 4.1 mmol/L      Comment: Specimen hemolyzed.  Result may be falsely elevated.        Chloride 107 mmol/L      CO2 19.8 mmol/L      Calcium 8.1 mg/dL      BUN/Creatinine Ratio 29.5     Anion Gap 8.2 mmol/L      eGFR 101.2 mL/min/1.73     Narrative:      GFR Categories in Chronic Kidney Disease (CKD)      GFR Category          GFR (mL/min/1.73)    Interpretation  G1                     90 or greater         Normal or high (1)  G2                      60-89                Mild decrease (1)  G3a                   45-59                Mild to moderate decrease  G3b                   30-44                Moderate to severe decrease  G4                    15-29                Severe decrease  G5                    14 or less           Kidney failure          (1)In the absence of evidence of kidney disease, neither GFR category G1 or G2 fulfill the criteria for CKD.    eGFR calculation 2021 CKD-EPI creatinine equation, which does not include race as a factor    CBC & Differential [[id number]] Collected: 01/10/25 0606    Specimen: Blood, Central Line Updated: 01/10/25 0628    Narrative:      The following orders were created for panel order CBC & Differential.  Procedure                               Abnormality         Status                     ---------                               -----------         ------                     CBC Auto Differential[[id number]]                                                       Scan Slide[[id number]]                                                                    Please view results for these tests on  the individual orders.    CBC Auto Differential [[id number]] Updated: 01/10/25 0628    Specimen: Blood, Central Line     POC Glucose Once [[id number]]  (Abnormal) Collected: 01/09/25 2010    Specimen: Blood Updated: 01/09/25 2012     Glucose 116 mg/dL      Comment: Serial Number: [id number]:  [id number]       Basic Metabolic Panel [[id number]]  (Abnormal) Collected: 01/09/25 1212    Specimen: Blood Updated: 01/09/25 1248     Glucose 107 mg/dL      BUN 23 mg/dL      Creatinine 0.74 mg/dL      Sodium 137 mmol/L      Potassium 4.0 mmol/L      Comment: Specimen hemolyzed.  Result may be falsely elevated.        Chloride 107 mmol/L      CO2 21.4 mmol/L      Calcium 8.5 mg/dL      BUN/Creatinine Ratio 31.1     Anion Gap 8.6 mmol/L      eGFR 91.6 mL/min/1.73     Narrative:      GFR Categories in Chronic Kidney Disease (CKD)      GFR Category          GFR (mL/min/1.73)    Interpretation  G1                     90 or greater         Normal or high (1)  G2                      60-89                Mild decrease (1)  G3a                   45-59                Mild to moderate decrease  G3b                   30-44                Moderate to severe decrease  G4                    15-29                Severe decrease  G5                    14 or less           Kidney failure          (1)In the absence of evidence of kidney disease, neither GFR category G1 or G2 fulfill the criteria for CKD.    eGFR calculation 2021 CKD-EPI creatinine equation, which does not include race as a factor    POC Glucose 4x Daily Before Meals & at Bedtime [[id number]]  (Normal) Collected: 01/09/25 1140    Specimen: Blood Updated: 01/09/25 1142     Glucose 95 mg/dL      Comment: Serial Number: [id number]:  [id number]       Blood Culture - Blood, Hand, Right [[id number]]  (Normal) Collected: 01/08/25 1112    Specimen: Blood from Hand, Right Updated: 01/09/25 1131     Blood Culture No growth at 24 hours    POC Glucose 4x Daily Before Meals & at  Bedtime [[id number]]  (Normal) Collected: 01/09/25 0824    Specimen: Blood Updated: 01/09/25 0825     Glucose 99 mg/dL      Comment: Serial Number: [id number]:  [id number]               PENDING RESULTS:     IMAGING REVIEWED:  No radiology results for the last day    Assessment & Plan     ASSESSMENT:    :Metastatic breast cancer presenting as 1.1 cm mixed lytic/sclerotic hypermetabolic lesion in the left hemisacrum suspicious for metastatic disease 1.2 cm sclerotic lesion on L4, small L3 lesion and T11 lesion.  Tumor is ER positive, PA positive and HER-2/bishop negative.  Currently on treatment with abemaciclib (100 mg daily - recent dose reduction due to thrombocytopenia and recurrent UTIs) and exemestane 25 mg daily.  Will continue to hold Abemaciclib.  Thrombocytopenia secondary to abemaciclib, bacteremia: Platelets 52,000 upon admission.  Today down to 36,000.  Acute drop may be secondary to acute infection.  Continue to monitor closely..  Her platelets are up to 53,000.  Hypotension due to volume depletion, JOSE, diarrhea: nephrology following. Supportive care and infectious workup per primary team.  Group B streptococcal bacteremia with possible UTI: Empirically started on ceftriaxone     PLAN  Continue supportive care per primary team and infectious disease  Continue IV antibiotics for 6 weeks  GREGORY reviewed. Thrombus versus vegetation on pacemaker leads Repeat GREGORY in 6 weeks post antibiotics per ID and cardiology recs   Will hold abemaciclib until clinical improvement.   Daily CBCs  Discussed with patient            Electronically signed by Мария Nunez MD, 01/10/25, 7:30 PM EST.

## 2025-01-10 NOTE — PROGRESS NOTES
RENAL/KCC:     LOS: 4 days   Patient Care Team:  Chirag Robles DO as PCP - General (Family Medicine)  Мария Nunez MD as Consulting Physician (Hematology and Oncology)  Steven Hammond MD as Consulting Physician (Cardiology)    Chief Complaint:  JOSE    Subjective     Interval History:   Chart reviewed  C/O LE edema    Objective     Vital Signs  Temp:  [97.4 °F (36.3 °C)-98.7 °F (37.1 °C)] 97.8 °F (36.6 °C)  Heart Rate:  [60-69] 68  Resp:  [15-18] 18  BP: (106-173)/(45-73) 134/56    Physical Exam:  GEN: Awake, NAD  ENT: PERRL, EOMI, MMM  NECK: Supple, no JVD  CHEST: CTAB, no W/R/C  CV: RRR, no M/G/R, +edema  ABD: Soft, NT, +BS  SKIN: Warm and Dry  NEURO: CN's intact       Results Review:     I reviewed the patient's new clinical results.    Medication Review: Reviewed    Assessment & Plan     JOSE  UTI  Proteinuria  Metabolic acidosis  Bacteremia  Edema    Plan: Cr improved to 0.6.  Re-evaluate proteinuria outpatient.  ABX per ID.  Add low-dose daily Lasix.  Will follow along.      Brian Wilson MD  [facility]  01/10/25  06:48 EST

## 2025-01-10 NOTE — PROGRESS NOTES
Infectious Diseases Progress Note      LOS: 4 days   Patient Care Team:  Chirag Robles DO as PCP - General (Family Medicine)  Мария Nunez MD as Consulting Physician (Hematology and Oncology)  Steven Hammond MD as Consulting Physician (Cardiology)    Chief Complaint: Nausea, vomiting, generalized pain    Subjective       The patient has been afebrile for the last 24 hours.  The patient is on room air, hemodynamically stable, and is tolerating antimicrobial therapy.  Patient just generally does not feel well      Review of Systems:   Review of Systems   Constitutional: Negative.    HENT: Negative.     Eyes: Negative.    Respiratory: Negative.     Cardiovascular: Negative.    Gastrointestinal:  Positive for nausea.   Endocrine: Negative.    Genitourinary: Negative.    Musculoskeletal: Negative.         Generalized pain   Skin: Negative.    Neurological: Negative.    Psychiatric/Behavioral: Negative.     All other systems reviewed and are negative.       Objective     Vital Signs  Temp:  [97.6 °F (36.4 °C)-98.7 °F (37.1 °C)] 97.8 °F (36.6 °C)  Heart Rate:  [60-82] 82  Resp:  [16-18] 18  BP: (107-134)/(45-57) 119/53    Physical Exam:  Physical Exam  Vitals and nursing note reviewed.   Constitutional:       General: She is not in acute distress.     Appearance: She is well-developed and normal weight. She is ill-appearing. She is not diaphoretic.   HENT:      Head: Normocephalic and atraumatic.   Eyes:      General: No scleral icterus.     Extraocular Movements: Extraocular movements intact.      Conjunctiva/sclera: Conjunctivae normal.      Pupils: Pupils are equal, round, and reactive to light.   Cardiovascular:      Rate and Rhythm: Normal rate and regular rhythm.      Heart sounds: Normal heart sounds, S1 normal and S2 normal. No murmur heard.  Pulmonary:      Effort: Pulmonary effort is normal. No respiratory distress.      Breath sounds: Normal breath sounds. No stridor. No wheezing or  rales.   Chest:      Chest wall: No tenderness.   Abdominal:      General: Bowel sounds are normal. There is no distension.      Palpations: Abdomen is soft. There is no mass.      Tenderness: There is no abdominal tenderness. There is no guarding.   Musculoskeletal:         General: No swelling, tenderness or deformity. Normal range of motion.      Cervical back: Neck supple.   Skin:     General: Skin is warm and dry.      Coloration: Skin is not pale.      Findings: No bruising, erythema or rash.   Neurological:      General: No focal deficit present.      Mental Status: She is alert and oriented to person, place, and time. Mental status is at baseline.      Cranial Nerves: No cranial nerve deficit.   Psychiatric:         Mood and Affect: Mood normal.          Results Review:    I have reviewed all clinical data, test, lab, and imaging results.     Radiology  No Radiology Exams Resulted Within Past 24 Hours    Cardiology    Laboratory    Results from last 7 days   Lab Units 01/10/25  0950 01/08/25  0401 01/07/25  0453 01/06/25  0553 01/06/25  0022   WBC 10*3/mm3 7.54 3.80 4.64 4.89 11.40*   HEMOGLOBIN g/dL 9.3* 10.8* 10.7* 11.5* 12.6   HEMATOCRIT % 28.8* 33.7* 34.1 36.4 37.8   PLATELETS 10*3/mm3 53* 43* 36* 42* 52*     Results from last 7 days   Lab Units 01/10/25  0606 01/09/25  1212 01/08/25  1514 01/08/25  0401 01/07/25  0453 01/06/25  0553 01/06/25  0022   SODIUM mmol/L 135* 137  --  134* 135* 136 135*   POTASSIUM mmol/L 4.1 4.0 4.0 3.6 3.1* 3.2* 3.4*   CHLORIDE mmol/L 107 107  --  107 108* 106 100   CO2 mmol/L 19.8* 21.4*  --  19.7* 16.9* 16.6* 19.5*   BUN mg/dL 18 23  --  39* 50* 52* 56*   CREATININE mg/dL 0.61 0.74  --  0.86 1.09* 1.79* 2.05*   GLUCOSE mg/dL 118* 107*  --  113* 97 116* 117*   ALBUMIN g/dL  --   --   --  3.0* 2.8* 3.3* 3.2*   BILIRUBIN mg/dL  --   --   --  0.5 0.7 1.4* 1.4*   ALK PHOS U/L  --   --   --  52 39 43 64   AST (SGOT) U/L  --   --   --  33* 36* 36* 46*   ALT (SGPT) U/L  --   --    --  14 17 18 24   CALCIUM mg/dL 8.1* 8.5*  --  8.7 7.6* 7.7* 9.0                   Microbiology   Microbiology Results (last 10 days)       Procedure Component Value - Date/Time    Blood Culture - Blood, Hand, Right [[id number]]  (Normal) Collected: 01/08/25 1112    Lab Status: Preliminary result Specimen: Blood from Hand, Right Updated: 01/10/25 1131     Blood Culture No growth at 2 days    Urine Culture - Urine, Straight Cath [[id number]]  (Abnormal) Collected: 01/06/25 0035    Lab Status: Final result Specimen: Urine from Straight Cath Updated: 01/07/25 1035     Urine Culture 50,000 CFU/mL Streptococcus agalactiae (Group B)     Comment:   This organism is considered to be universally susceptible to penicillin.  No further antibiotic testing will be performed.       Narrative:      Colonization of the urinary tract without infection is common. Treatment is discouraged unless the patient is symptomatic, pregnant, or undergoing an invasive urologic procedure.    Blood Culture - Blood, Arm, Right [[id number]]  (Abnormal) Collected: 01/06/25 0033    Lab Status: Final result Specimen: Blood from Arm, Right Updated: 01/08/25 0707     Blood Culture Streptococcus agalactiae (Group B)     Isolated from Aerobic and Anaerobic Bottles     Gram Stain Anaerobic Bottle Gram positive cocci in pairs and chains      Aerobic Bottle Gram positive cocci in pairs and chains    Narrative:      Refer to previous blood culture collected on 01/06/2025 at 0032 for MICs.        Less than seven (7) mL's of blood was collected.  Insufficient quantity may yield false negative results.    Blood Culture - Blood, Arm, Left [[id number]]  (Abnormal)  (Susceptibility) Collected: 01/06/25 0032    Lab Status: Final result Specimen: Blood from Arm, Left Updated: 01/08/25 0706     Blood Culture Streptococcus agalactiae (Group B)     Isolated from Aerobic and Anaerobic Bottles     Gram Stain Anaerobic Bottle Gram positive cocci in pairs and chains       Aerobic Bottle Gram positive cocci in pairs and chains    Susceptibility        Streptococcus agalactiae (Group B)      MICK      Ceftriaxone Susceptible      Penicillin G Susceptible      Vancomycin Susceptible                           Blood Culture ID, PCR - Blood, Arm, Left [[id number]]  (Abnormal) Collected: 01/06/25 0032    Lab Status: Final result Specimen: Blood from Arm, Left Updated: 01/06/25 1200     BCID, PCR Streptococcus agalactiae (Group B). Identification by BCID2 PCR.     BOTTLE TYPE Anaerobic Bottle    Respiratory Panel PCR w/COVID-19(SARS-CoV-2) ELISHA/MARQUEZ/HAMMAD/PAD/COR/SHARDA In-House, NP Swab in UTM/VTM, 2 HR TAT - Swab, Nasopharynx [[id number]]  (Normal) Collected: 01/06/25 0024    Lab Status: Final result Specimen: Swab from Nasopharynx Updated: 01/06/25 0130     ADENOVIRUS, PCR Not Detected     Coronavirus 229E Not Detected     Coronavirus HKU1 Not Detected     Coronavirus NL63 Not Detected     Coronavirus OC43 Not Detected     COVID19 Not Detected     Human Metapneumovirus Not Detected     Human Rhinovirus/Enterovirus Not Detected     Influenza A PCR Not Detected     Influenza B PCR Not Detected     Parainfluenza Virus 1 Not Detected     Parainfluenza Virus 2 Not Detected     Parainfluenza Virus 3 Not Detected     Parainfluenza Virus 4 Not Detected     RSV, PCR Not Detected     Bordetella pertussis pcr Not Detected     Bordetella parapertussis PCR Not Detected     Chlamydophila pneumoniae PCR Not Detected     Mycoplasma pneumo by PCR Not Detected    Narrative:      In the setting of a positive respiratory panel with a viral infection PLUS a negative procalcitonin without other underlying concern for bacterial infection, consider observing off antibiotics or discontinuation of antibiotics and continue supportive care. If the respiratory panel is positive for atypical bacterial infection (Bordetella pertussis, Chlamydophila pneumoniae, or Mycoplasma pneumoniae), consider antibiotic de-escalation to  target atypical bacterial infection.            Medication Review:       Schedule Meds  cefTRIAXone, 2,000 mg, Intravenous, Q24H  furosemide, 20 mg, Oral, Daily  heparin (porcine), 5,000 Units, Subcutaneous, Q8H  insulin lispro, 2-7 Units, Subcutaneous, 4x Daily AC & at Bedtime  sodium bicarbonate, 650 mg, Oral, BID  sodium chloride, 10 mL, Intravenous, Q12H  sodium chloride, 10 mL, Intravenous, Q12H        Infusion Meds       PRN Meds    Calcium Replacement - Follow Nurse / BPA Driven Protocol    dextrose    dextrose    glucagon (human recombinant)    Magnesium Standard Dose Replacement - Follow Nurse / BPA Driven Protocol    nitroglycerin    ondansetron    Phosphorus Replacement - Follow Nurse / BPA Driven Protocol    Potassium Replacement - Follow Nurse / BPA Driven Protocol    sodium chloride    sodium chloride    sodium chloride    sodium chloride    sodium chloride        Assessment & Plan       Antimicrobial Therapy   1.  IV ceftriaxone        2.        3.        4.        5.          Assessment     Group B streptococcus bacteremia.    Patient has a pacemaker placed in the past and she had remote history of cardiac surgery.  I believe during childhood secondary to congenital heart disease.  GREGORY performed on 1/9/2025 and there was no vegetation of the heart valves but there was a lesion on one of the pacemaker leads consistent with vegetation versus thrombus.  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    Positive urine culture for group B streptococcus.  I believe this is descending infection.    Metastatic breast cancer currently on targeted biologic therapy (abemaciiclib)     Thrombocytopenia-likely related to the above     Plan     The case was discussed with cardiology service.  The patient is not pacer dependent and she is a poor candidate for removal of the pacemaker leads.  At this point we agreed to  treat patient medically with 6 weeks of IV antibiotics.  Recommend 6 weeks of IV ceftriaxone 2 g daily.Last day on 2/18/25  Recommend to repeat GREGORY after 6 weeks    Patient will need a PICC line before discharge-please remove when IV antibiotics are completed.  Standard weekly PICC line care  Weekly labs CBC and creatinine x 6 weeks  Continue supportive care  Orders entered for ambulatory care labs and line care  Okay to discharge from ID standpoint when IV antibiotics are arranged  Not much more to add from infectious disease standpoint-we will sign off at this time-please call with any questions.    Please fax all post discharge lab results, imaging studies and correspondence to this fax number [phone]  For any question or concern please contact our service number [phone]    Bria Cooney, APRN  01/10/25  13:56 EST    Note is dictated utilizing voice recognition software/Dragon

## 2025-01-10 NOTE — PLAN OF CARE
Problem: Adult Inpatient Plan of Care  Goal: Plan of Care Review  Outcome: Progressing  Goal: Patient-Specific Goal (Individualized)  Outcome: Progressing  Goal: Absence of Hospital-Acquired Illness or Injury  Outcome: Progressing  Intervention: Identify and Manage Fall Risk  Recent Flowsheet Documentation  Taken 1/10/2025 1606 by Kristin Faust LPN  Safety Promotion/Fall Prevention:   activity supervised   assistive device/personal items within reach   clutter free environment maintained   lighting adjusted   room organization consistent   safety round/check completed  Taken 1/10/2025 1400 by Kristin Faust LPN  Safety Promotion/Fall Prevention:   activity supervised   assistive device/personal items within reach   clutter free environment maintained   lighting adjusted   room organization consistent   safety round/check completed  Taken 1/10/2025 1230 by Kristin Faust LPN  Safety Promotion/Fall Prevention:   activity supervised   assistive device/personal items within reach   clutter free environment maintained   lighting adjusted   room organization consistent   safety round/check completed  Taken 1/10/2025 1000 by Kristin Faust LPN  Safety Promotion/Fall Prevention:   activity supervised   assistive device/personal items within reach   clutter free environment maintained   lighting adjusted   room organization consistent   safety round/check completed  Taken 1/10/2025 0856 by Kristin Faust LPN  Safety Promotion/Fall Prevention:   activity supervised   assistive device/personal items within reach   clutter free environment maintained   lighting adjusted   room organization consistent   safety round/check completed  Intervention: Prevent Skin Injury  Recent Flowsheet Documentation  Taken 1/10/2025 0856 by Kristin Faust LPN  Skin Protection: incontinence pads utilized  Intervention: Prevent and Manage VTE (Venous Thromboembolism) Risk  Recent Flowsheet Documentation  Taken 1/10/2025 0856 by  Kristin Faust LPN  VTE Prevention/Management:   SCDs (sequential compression devices) off   patient refused intervention  Intervention: Prevent Infection  Recent Flowsheet Documentation  Taken 1/10/2025 1606 by Kristin Faust LPN  Infection Prevention:   cohorting utilized   environmental surveillance performed   hand hygiene promoted   personal protective equipment utilized   rest/sleep promoted   single patient room provided   visitors restricted/screened  Taken 1/10/2025 1400 by Kristin Faust LPN  Infection Prevention:   cohorting utilized   environmental surveillance performed   hand hygiene promoted   personal protective equipment utilized   rest/sleep promoted   single patient room provided   visitors restricted/screened  Taken 1/10/2025 1230 by Kristin Faust LPN  Infection Prevention:   cohorting utilized   environmental surveillance performed   hand hygiene promoted   personal protective equipment utilized   rest/sleep promoted   single patient room provided   visitors restricted/screened  Taken 1/10/2025 1000 by Kristin Faust LPN  Infection Prevention:   cohorting utilized   environmental surveillance performed   hand hygiene promoted   personal protective equipment utilized   rest/sleep promoted   single patient room provided   visitors restricted/screened  Taken 1/10/2025 0856 by Kristin Faust LPN  Infection Prevention:   cohorting utilized   environmental surveillance performed   hand hygiene promoted   personal protective equipment utilized   rest/sleep promoted   single patient room provided   visitors restricted/screened  Goal: Optimal Comfort and Wellbeing  Outcome: Progressing  Intervention: Monitor Pain and Promote Comfort  Recent Flowsheet Documentation  Taken 1/10/2025 1606 by Kristin Faust LPN  Pain Management Interventions:   care clustered   diversional activity provided  Taken 1/10/2025 1230 by Kristin Faust LPN  Pain Management Interventions:   care clustered    diversional activity provided   pain pump in use  Taken 1/10/2025 0856 by Kristin Faust LPN  Pain Management Interventions:   care clustered   diversional activity provided  Intervention: Provide Person-Centered Care  Recent Flowsheet Documentation  Taken 1/10/2025 1606 by Kristin Faust LPN  Trust Relationship/Rapport:   care explained   choices provided  Taken 1/10/2025 1230 by Kristin Faust LPN  Trust Relationship/Rapport:   care explained   choices provided   thoughts/feelings acknowledged  Taken 1/10/2025 0856 by Kristin Faust LPN  Trust Relationship/Rapport:   choices provided   care explained   thoughts/feelings acknowledged  Goal: Readiness for Transition of Care  Outcome: Progressing   Goal Outcome Evaluation:        Patient has rested in bed this shift. Up to bathroom with assistance.  has been at bedside. Patient has refused bed alarms this shift. Family to remain at bedside. Respiration unlabored with no sob noted.

## 2025-01-10 NOTE — PROGRESS NOTES
Cardiology [place]        LOS:  LOS: 4 days   Patient Name: Gladys Garrison  Age/Sex: 64 y.o. female  : 1960  MRN: [id number]    Day of Service: 01/10/25   Length of Stay: 4  Encounter Provider: CIRO Fuentes  Place of Service: [facility] CARDIOLOGY  Patient Care Team:  Chirag Robles DO as PCP - General (Family Medicine)  Мария Nunez MD as Consulting Physician (Hematology and Oncology)  Steven Hammond MD as Consulting Physician (Cardiology)    Subjective:     Chief Complaint: f/u + BC, ? Vegetation vs. Thrombus on PPM    Subjective: no acute events, GREGORY yesterday, no vegetation but thrombus vs. Vegetation on PPM lead, some edema, adding lasix, no chest pain    Plan ultimately for continuation of antibiotics  Poor candidate for lead extraction  Follow blood cultures and if recurrent would recommend referral for laser lead extraction however she is pacemaker dependent and would require additional lead placement versus Micra leadless pacemaker placement in the interim, decisions will need to be made at that time depending on outcome     Current Medications:   Scheduled Meds:cefTRIAXone, 2,000 mg, Intravenous, Q24H  furosemide, 20 mg, Oral, Daily  heparin (porcine), 5,000 Units, Subcutaneous, Q8H  insulin lispro, 2-7 Units, Subcutaneous, 4x Daily AC & at Bedtime  sodium bicarbonate, 650 mg, Oral, BID  sodium chloride, 10 mL, Intravenous, Q12H  sodium chloride, 10 mL, Intravenous, Q12H      Continuous Infusions:     Allergies:  Allergies   Allergen Reactions    Oxycodone Nausea And Vomiting    Promethazine Other (See Comments)     Hyperactive mean    Tape Other (See Comments)     .blisters      Adhesive Tape Rash    Flexeril [Cyclobenzaprine] Rash       Review of Systems   Constitutional: Negative for chills, diaphoresis and malaise/fatigue.   Cardiovascular:  Positive for leg swelling. Negative for chest pain, dyspnea on exertion, irregular heartbeat,  near-syncope, orthopnea, palpitations, paroxysmal nocturnal dyspnea and syncope.   Respiratory:  Negative for cough, shortness of breath, sleep disturbances due to breathing and sputum production.    Gastrointestinal:  Negative for change in bowel habit.   Genitourinary:  Negative for urgency.   Neurological:  Negative for dizziness and headaches.   Psychiatric/Behavioral:  Negative for altered mental status.          Objective:     Temp:  [97.6 °F (36.4 °C)-98.7 °F (37.1 °C)] 97.8 °F (36.6 °C)  Heart Rate:  [60-82] 82  Resp:  [16-18] 18  BP: (107-134)/(45-57) 119/53     Intake/Output Summary (Last 24 hours) at 1/10/2025 1304  Last data filed at 1/9/2025 1700  Gross per 24 hour   Intake 240 ml   Output 0 ml   Net 240 ml     Body mass index is 19.92 kg/m².      01/05/25  2351 01/07/25  1153   Weight: 46.3 kg (102 lb) 46.3 kg (102 lb)         General Appearance:    Alert, cooperative, in no acute distress                                Head: Atraumatic, normocephalic, PERRLA               Neck:   supple, trachea midline, no thyromegaly, no carotid bruit, no JVD   Lungs:     Clear to auscultation, respirations regular, even and               unlabored    Heart:    Regular rhythm and normal rate, normal S1 and S2   Abdomen:     Normal bowel sounds, no masses, no organomegaly, soft  nontender, nondistended, no guarding, no rebound  tenderness   Extremities:   Moves all extremities well,  edema, no cyanosis, no  redness   Pulses:   Pulses palpable and equal bilaterally   Skin:   No bleeding, bruising or rash   Neurologic:   Awake, alert, oriented x3   Unchanged from prior      Lab Review:   Results from last 7 days   Lab Units 01/10/25  0606 01/09/25  1212 01/08/25  1514 01/08/25  0401 01/07/25  0453   SODIUM mmol/L 135* 137  --  134* 135*   POTASSIUM mmol/L 4.1 4.0   < > 3.6 3.1*   CHLORIDE mmol/L 107 107  --  107 108*   CO2 mmol/L 19.8* 21.4*  --  19.7* 16.9*   BUN mg/dL 18 23  --  39* 50*   CREATININE mg/dL 0.61 0.74   "--  0.86 1.09*   GLUCOSE mg/dL 118* 107*  --  113* 97   CALCIUM mg/dL 8.1* 8.5*  --  8.7 7.6*   AST (SGOT) U/L  --   --   --  33* 36*   ALT (SGPT) U/L  --   --   --  14 17    < > = values in this interval not displayed.         Results from last 7 days   Lab Units 01/10/25  0950 01/08/25  0401   WBC 10*3/mm3 7.54 3.80   HEMOGLOBIN g/dL 9.3* 10.8*   HEMATOCRIT % 28.8* 33.7*   PLATELETS 10*3/mm3 53* 43*         Results from last 7 days   Lab Units 01/07/25  0453 01/06/25  0553   MAGNESIUM mg/dL 2.1 2.0           Invalid input(s): \"LDLCALC\"            Recent Radiology:  Imaging Results (Most Recent)       Procedure Component Value Units Date/Time    US Renal Bilateral [[id number]] Collected: 01/06/25 0932     Updated: 01/06/25 0935    Narrative:      US RENAL BILATERAL    Date of Exam: 1/6/2025 9:08 AM EST    Indication: JOSE, r/o obstruction.    Comparison: No comparisons available.    Technique: Grayscale and color Doppler ultrasound evaluation of the kidneys and urinary bladder was performed.      Findings:  Right kidney measures 9.8 cm length. Left kidney measures 9.7 cm length. Both kidneys are normal in echogenicity. No hydronephrosis. Grossly unremarkable incompletely distended urinary bladder      Impression:      Impression:  Normal ultrasound appearance of the kidneys with no evidence of obstructive uropathy        Electronically Signed: Roque Penn    1/6/2025 9:33 AM EST    Workstation ID: [id number]    CT Cervical Spine Without Contrast [[id number]] Collected: 01/06/25 0158     Updated: 01/06/25 0206    Narrative:      CT CERVICAL SPINE WO CONTRAST    Date of Exam: 1/6/2025 1:09 AM EST    Indication: Neck pain after fall.    Comparison: 3/29/2023    Technique: Axial CT images were obtained of the cervical spine without contrast administration.  Sagittal and coronal reconstructions were performed.  Automated exposure control and iterative reconstruction methods were used.    Findings:  Patient is fused from " C3-C7 with an anterior plate and screws from C3-C6. Cervical alignment is normal. There is multilevel facet arthropathy. There is disc space narrowing at C7-T1. There is scoliosis at the cervicothoracic junction. No acute cervical   spine fracture is identified. Paraspinal soft tissues are grossly normal.      Impression:      No acute cervical spine fracture. No significant interval change.      Electronically Signed: Brad Foster MD    1/6/2025 2:04 AM EST    Workstation ID: [id number]    CT Facial Bones Without Contrast [[id number]] Collected: 01/06/25 0154     Updated: 01/06/25 0200    Narrative:      CT FACIAL BONES WO CONTRAST    Date of Exam: 1/6/2025 1:09 AM EST    Indication: Fall with bruising.    Comparison: CT orbit 10/12/2018    Technique: Axial CT images were obtained from the inferior aspect of the mandible through the frontal sinuses without contrast administration.  Sagittal and coronal reconstructions were performed.  Automated exposure control and iterative reconstruction   methods were used.    Findings:  Temporomandibular joints demonstrate normal alignment. The mandible is intact. No acute facial bone fracture. No evidence of acute sinus disease. The globes and orbits appear grossly normal.      Impression:      Negative facial bone CT.        Electronically Signed: Brad Foster MD    1/6/2025 1:58 AM EST    Workstation ID: [id number]    CT Abdomen Pelvis Without Contrast [[id number]] Collected: 01/06/25 0144     Updated: 01/06/25 0158    Narrative:      CT ABDOMEN PELVIS WO CONTRAST    Date of Exam: 1/6/2025 1:09 AM EST    Indication: lower abdominal pain, diarrhea.    Comparison: CT abdomen pelvis August 5, 2024    Technique: Axial CT images were obtained of the abdomen and pelvis without the administration of contrast. Sagittal and coronal reconstructions were performed.  Automated exposure control and iterative reconstruction methods were used.    Findings:  Lung Bases:     There is mild  right middle lobe atelectasis. There is bilateral basilar atelectasis. The heart is enlarged. There are postsurgical changes of the pulmonary artery. Heavy coronary artery calcifications are seen.    Liver:  The liver is of normal size. There are no focal liver lesions with noncontrast technique.    Biliary/Gallbladder:    Layering density in the gallbladder may be stones or sludge. The biliary tree is normal.    Spleen:  There is mild splenomegaly.    Pancreas:    Pancreas is normal. There is no evidence of pancreatic mass or peripancreatic fluid.    Kidneys:    There is a nonobstructing stone of the left kidney. There is no hydronephrosis of either kidney.    Adrenals:    Adrenal glands are unremarkable.    Retroperitoneal/Lymph Nodes/Vasculature:    No retroperitoneal adenopathy is identified.    Gastrointestinal/Mesentery:    The bowel loops are non-dilated without wall thickening or mass. The appendix appears within normal limits. No evidence of obstruction. No free air. No mesenteric fluid collections identified.    Bladder:    The bladder is normal.    Genital:     Unremarkable          Bony Structures:     There are old compression fractures of T12 and L1. A benign hemangioma of L4 is unchanged. There is no acute fracture.        Impression:      Impression:  1.No acute abdominal or pelvic abnormality.  2.Mild splenomegaly.  3.Nonobstructing left renal stone.  4.Layering density in the gallbladder may be stones or sludge.                Electronically Signed: Rocky Heredia MD    1/6/2025 1:56 AM EST    Workstation ID: [id number]    CT Head Without Contrast [[id number]] Collected: 01/06/25 0148     Updated: 01/06/25 0156    Narrative:      CT HEAD WO CONTRAST    HISTORY:  Head injury from fall. Bruising.    TECHNIQUE:  Axial unenhanced head CT with coronal reformats. Radiation dose reduction techniques included automated exposure control or exposure modulation based on body size.    COMPARISON:  11/2/2024,  11/5/2023    FINDINGS:  Ventricular size and configuration are normal. No acute infarct or hemorrhage is identified. There are no masses. There is no skull fracture. Atherosclerotic calcifications are present in the vertebral arteries and carotid siphons. Mild chronic small   vessel ischemic changes are present in the white matter.      Impression:      Senescent changes without acute abnormality.          Electronically Signed: Brad Foster MD    1/6/2025 1:54 AM EST    Workstation ID: [id number]    XR Chest 1 View [[id number]] Collected: 01/06/25 0036     Updated: 01/06/25 0040    Narrative:      XR CHEST 1 VW    Date of Exam: 1/6/2025 12:20 AM EST    Indication: cough, soa    Comparison: 11/2/2024    Findings:  There is a small stent graft in the proximal descending aorta. The heart remains enlarged status post CABG and valve repair. Left-sided multipolar pacer is present. Patient is status post mastectomy with bilateral breast reconstructions. Patient is also   status post cervical spinal fusion. No acute infiltrates are identified. No pneumothorax.      Impression:      1.Cardiomegaly with postoperative changes of CABG and valve repair.  2.No acute infiltrates identified.        Electronically Signed: Brad Foster MD    1/6/2025 12:38 AM EST    Workstation ID: [id number]            ECHOCARDIOGRAM:    Results for orders placed during the hospital encounter of 01/05/25    Adult Transthoracic Echo Complete W/ Cont if Necessary Per Protocol    Interpretation Summary    Left ventricular systolic function is normal. Left ventricular ejection fraction appears to be 56 - 60%.    Left ventricular diastolic function is consistent with (grade II w/high LAP) pseudonormalization.    Moderately reduced right ventricular systolic function noted.    The right ventricular cavity is severely dilated.    The left atrial cavity is moderate to severely dilated.    Left atrial volume is moderately increased.    The right atrial  cavity is mildly  dilated.    Moderate tricuspid valve regurgitation is present.    Estimated right ventricular systolic pressure from tricuspid regurgitation is moderately elevated (45-55 mmHg).    Moderate to severe pulmonary hypertension is present.    There is a bioprosthetic pulmonic valve present.        I reviewed the patient's new clinical results.    EKG:      Assessment:       JOSE (acute kidney injury)    Nausea / diarrhea   JOSE  Group B bacteremia- ID requesting GREGORY---no valvular vegetation but questionable Thrombus vs. 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  Under therapy evaluation also pain management from oncology standpoint  She also history of COVID 2021 she was admitted recently in February 2023 with chest pain atypical nature thought to be secondary to metastatic disease  She has no history of obstructive CAD or coronary intervention       Plan:   GREGORY showed no valvular vegetation but ? Thrombus vs. Vegetation on one of her PPM leads  ID planning IV antibiotics x 6 weeks with repeat GREGORY in 6 weeks, agree  Follow-up blood cultures and if recurrent may need laser lead extraction of leads along with replacement of leads given she is dependent versus leadless pacemaker placement at that time  Repeat transesophageal echo after completion of antibiotics  Daily lasix added today    Continue to follow  Stanley Lopez MD, PhD    Zandra Quiroz, APRN  01/10/25  13:04 EST

## 2025-01-11 PROBLEM — E43 SEVERE PROTEIN-CALORIE MALNUTRITION: Status: ACTIVE | Noted: 2025-01-11

## 2025-01-11 LAB
ANION GAP SERPL CALCULATED.3IONS-SCNC: 8.8 MMOL/L (ref 5–15)
BUN SERPL-MCNC: 15 MG/DL (ref 8–23)
BUN/CREAT SERPL: 25 (ref 7–25)
CALCIUM SPEC-SCNC: 8.4 MG/DL (ref 8.6–10.5)
CHLORIDE SERPL-SCNC: 104 MMOL/L (ref 98–107)
CO2 SERPL-SCNC: 23.2 MMOL/L (ref 22–29)
CREAT SERPL-MCNC: 0.6 MG/DL (ref 0.57–1)
EGFRCR SERPLBLD CKD-EPI 2021: 101.6 ML/MIN/1.73
GLUCOSE BLDC GLUCOMTR-MCNC: 130 MG/DL (ref 70–105)
GLUCOSE BLDC GLUCOMTR-MCNC: 150 MG/DL (ref 70–105)
GLUCOSE BLDC GLUCOMTR-MCNC: 161 MG/DL (ref 70–105)
GLUCOSE BLDC GLUCOMTR-MCNC: 94 MG/DL (ref 70–105)
GLUCOSE SERPL-MCNC: 102 MG/DL (ref 65–99)
POTASSIUM SERPL-SCNC: 3.5 MMOL/L (ref 3.5–5.2)
POTASSIUM SERPL-SCNC: 3.6 MMOL/L (ref 3.5–5.2)
SODIUM SERPL-SCNC: 136 MMOL/L (ref 136–145)

## 2025-01-11 PROCEDURE — 25010000002 CEFTRIAXONE PER 250 MG: Performed by: NURSE PRACTITIONER

## 2025-01-11 PROCEDURE — 82948 REAGENT STRIP/BLOOD GLUCOSE: CPT | Performed by: STUDENT IN AN ORGANIZED HEALTH CARE EDUCATION/TRAINING PROGRAM

## 2025-01-11 PROCEDURE — 63710000001 INSULIN LISPRO (HUMAN) PER 5 UNITS: Performed by: STUDENT IN AN ORGANIZED HEALTH CARE EDUCATION/TRAINING PROGRAM

## 2025-01-11 PROCEDURE — 84132 ASSAY OF SERUM POTASSIUM: CPT | Performed by: INTERNAL MEDICINE

## 2025-01-11 PROCEDURE — 82948 REAGENT STRIP/BLOOD GLUCOSE: CPT

## 2025-01-11 PROCEDURE — 25010000002 HEPARIN (PORCINE) PER 1000 UNITS: Performed by: STUDENT IN AN ORGANIZED HEALTH CARE EDUCATION/TRAINING PROGRAM

## 2025-01-11 PROCEDURE — 80048 BASIC METABOLIC PNL TOTAL CA: CPT | Performed by: HOSPITALIST

## 2025-01-11 PROCEDURE — 97530 THERAPEUTIC ACTIVITIES: CPT

## 2025-01-11 RX ORDER — POTASSIUM CHLORIDE 1500 MG/1
40 TABLET, EXTENDED RELEASE ORAL ONCE
Status: DISCONTINUED | OUTPATIENT
Start: 2025-01-11 | End: 2025-01-13 | Stop reason: HOSPADM

## 2025-01-11 RX ORDER — AMLODIPINE BESYLATE 5 MG/1
5 TABLET ORAL
Status: DISCONTINUED | OUTPATIENT
Start: 2025-01-11 | End: 2025-01-13 | Stop reason: HOSPADM

## 2025-01-11 RX ORDER — POTASSIUM CHLORIDE 1500 MG/1
40 TABLET, EXTENDED RELEASE ORAL EVERY 4 HOURS
Status: COMPLETED | OUTPATIENT
Start: 2025-01-11 | End: 2025-01-11

## 2025-01-11 RX ORDER — POTASSIUM CHLORIDE 1.5 G/1.58G
40 POWDER, FOR SOLUTION ORAL EVERY 4 HOURS
Status: DISCONTINUED | OUTPATIENT
Start: 2025-01-11 | End: 2025-01-11

## 2025-01-11 RX ORDER — POTASSIUM CHLORIDE 1500 MG/1
20 TABLET, EXTENDED RELEASE ORAL DAILY
Status: DISCONTINUED | OUTPATIENT
Start: 2025-01-11 | End: 2025-01-13 | Stop reason: HOSPADM

## 2025-01-11 RX ADMIN — Medication 10 ML: at 08:23

## 2025-01-11 RX ADMIN — AMLODIPINE BESYLATE 5 MG: 5 TABLET ORAL at 12:04

## 2025-01-11 RX ADMIN — SODIUM BICARBONATE 650 MG: 650 TABLET ORAL at 20:58

## 2025-01-11 RX ADMIN — POTASSIUM CHLORIDE 40 MEQ: 1.5 POWDER, FOR SOLUTION ORAL at 18:05

## 2025-01-11 RX ADMIN — Medication 10 ML: at 20:59

## 2025-01-11 RX ADMIN — HEPARIN SODIUM 5000 UNITS: 5000 INJECTION INTRAVENOUS; SUBCUTANEOUS at 21:00

## 2025-01-11 RX ADMIN — SODIUM BICARBONATE 650 MG: 650 TABLET ORAL at 08:23

## 2025-01-11 RX ADMIN — CEFTRIAXONE 2000 MG: 2 INJECTION, POWDER, FOR SOLUTION INTRAMUSCULAR; INTRAVENOUS at 08:24

## 2025-01-11 RX ADMIN — FUROSEMIDE 20 MG: 20 TABLET ORAL at 08:24

## 2025-01-11 RX ADMIN — POTASSIUM CHLORIDE 40 MEQ: 1500 TABLET, EXTENDED RELEASE ORAL at 12:04

## 2025-01-11 RX ADMIN — HEPARIN SODIUM 5000 UNITS: 5000 INJECTION INTRAVENOUS; SUBCUTANEOUS at 14:28

## 2025-01-11 RX ADMIN — HEPARIN SODIUM 5000 UNITS: 5000 INJECTION INTRAVENOUS; SUBCUTANEOUS at 05:16

## 2025-01-11 RX ADMIN — POTASSIUM CHLORIDE 40 MEQ: 1500 TABLET, EXTENDED RELEASE ORAL at 08:22

## 2025-01-11 RX ADMIN — INSULIN LISPRO 2 UNITS: 100 INJECTION, SOLUTION INTRAVENOUS; SUBCUTANEOUS at 12:04

## 2025-01-11 NOTE — DISCHARGE PLACEMENT REQUEST
"Lucien Garrison (64 y.o. Female)       Date of Birth   1960    Social Security Number       Address   [address] [place] [address] [address] [place] [address]    Home Phone   [phone]    MRN   [id number]       Mormonism   None    Marital Status   Legally                             Admission Date   25    Admission Type   Emergency    Admitting Provider   Llanes Alvarez, Carlos, MD    Attending Provider   Bautista Chapman MD    Department, Room/Bed   [facility] 2D, 273/A       Discharge Date       Discharge Disposition       Discharge Destination                                 Attending Provider: Bautista Chapman MD    Allergies: Oxycodone, Promethazine, Tape, Adhesive Tape, Flexeril [Cyclobenzaprine]    Isolation: None   Infection: None   Code Status: CPR    Ht: 152.4 cm (60\")   Wt: 46.3 kg (102 lb)    Admission Cmt: None   Principal Problem: JOSE (acute kidney injury) [N17.9]                   Active Insurance as of 2025       Primary Coverage       Payor Plan Insurance Group Employer/Plan Group    AETNA MEDICARE REPLACEMENT [id number]       Payor Plan Address Payor Plan Phone Number Payor Plan Fax Number Effective Dates    [address] [phone]  2024 - None Entered    [facility] [id number]         Subscriber Name Subscriber Birth Date Member ID       LUCIEN GARRISON 1960 [id number]                     Emergency Contacts        (Rel.) Home Phone Work Phone Mobile Phone    ANGELLA GARRISON (Spouse) [phone]                 History & Physical        Llanes Alvarez, Carlos, MD at [address] [facility]              [facility] Services  History & Physical    Patient Name: Lucien Garrison  : 1960  MRN: [id number]  Primary Care Physician:  Chirag Robles DO  Date of admission: 2025  Date and Time of Service: 2025 at [facility]    Subjective      Chief Complaint: \"diarrhea, nausea, generalized " "weakness\"    History of Present Illness: Gladys Garrison is a 64 y.o. female with a PMH of breast cancer with metastasis to bone, complex cardiac medical history including tetralogy of Fallot, coarctation of aorta, VSD status post repair, coarctation of aorta S/P stent who presented to [facility] on 1/5/2025 with diarrhea since around last Tuesday it is dark color, associated with nausea and multiple episodes of vomiting. States she has not been able to keep anything down, having generalized abdominal cramps, subjective fever and chills, generalized weakness. States she was treated for a UTI last month. States she has not urinated at all in past 3-4 days. Denies hematuria or dysuria, no flank pain. She sustained a fall 2 days ago and hit her head and face, denies LOC. Workup in ER chemistry labs show creat 2.05, K 3.4, Na 135, albumin 3.2, T bili 1.4. CBC labs Hb 12.6, wbc 11.4, platelets 52. ED course notable for hypotension that slightly improved with IVF, temp 100.1. Saturating well on room air. The decision to admit was made.       Review of Systems   Constitutional:  Positive for fatigue and fever. Negative for chills.   Respiratory:  Negative for cough, choking and shortness of breath.    Cardiovascular:  Negative for chest pain.   Gastrointestinal:  Positive for abdominal pain, blood in stool, diarrhea, nausea and vomiting.   Genitourinary:  Positive for decreased urine volume. Negative for dysuria, flank pain and hematuria.   Neurological:  Positive for light-headedness. Negative for syncope.       Personal History     Past Medical History:   Diagnosis Date    Allergic     Bone cancer     METASTATIC BONE; stage 4    Bradycardia     SECONDARY TO ABLATION    Breast cancer 2017    mets to lymph nodes; did not do radiation    Cancer of unknown origin     Compression fx, thoracic spine, open, initial encounter     Coronary artery disease     COVID-19 09/01/2021    Diabetes mellitus     Heart disease, " unspecified     Hepatitis C     RESOLVED WITH MEDICATION    Hyperlipidemia     Hypertension     Obesity (BMI 30-39.9) 2021    Rib fracture     Per patient    Sleep apnea     no machine    Tachycardia     ATRIAL    Type 2 diabetes mellitus 2017       Past Surgical History:   Procedure Laterality Date    BACK SURGERY      neck X 2    BREAST RECONSTRUCTION Bilateral     CARDIAC ABLATION       atrial tachycardia x 5 ablations     CARDIAC CATHETERIZATION      CARDIAC ELECTROPHYSIOLOGY PROCEDURE N/A 2023    Procedure: Pacemaker RV lead insertion with generator change out. [employer];  Surgeon: Steven Hammond MD;  Location: [facility] CATH INVASIVE LOCATION;  Service: Cardiovascular;  Laterality: N/A;    CARDIAC SURGERY      stent placed in aorta    CARDIAC SURGERY      6 surgeries as baby     CERVICAL FUSION ANTERIOR WITH ARTIFICIAL DISCECTOMY IMPLANTATION N/A 2020    Procedure: C4 VERTEBRECTOMY AND ANTERIOR CERIVCAL DISCECTOMY WITH FUSION OF CERVICAL THREE THROUGH FIVE WITH REMOVAL OF HARDWARE C5-C6;  Surgeon: Mark Kaba MD;  Location: [facility] MAIN OR;  Service: Neurosurgery;  Laterality: N/A;     SECTION      x2    COLONOSCOPY N/A 10/23/2020    Procedure: COLONOSCOPY WITH POLYPECTOMY X6;  Surgeon: Stanley Bourne MD;  Location: [facility] ENDOSCOPY;  Service: Gastroenterology;  Laterality: N/A;  POLYPS, INTERNAL HEMORRHOIDS    ENDOMETRIAL ABLATION      REMOVAL SCAR TISSUE UTERINE    ENDOSCOPY N/A 10/23/2020    Procedure: ESOPHAGOGASTRODUODENOSCOPY WITH BIOPSY X 1 AREA;  Surgeon: Stanley Bourne MD;  Location: [facility] ENDOSCOPY;  Service: Gastroenterology;  Laterality: N/A;  GASTRITIS, ESOPHAGITIS, HIATAL HERNIA    INSERT / REPLACE / REMOVE PACEMAKER      KNEE SURGERY      MASTECTOMY Bilateral     NECK SURGERY      PACEMAKER IMPLANTATION      PAIN PUMP INSERTION/REVISION N/A 2022    Procedure: PAIN PUMP INSERTION AND INTRATHECAL CATHETER PLACEMENT;  Surgeon:  Chuck Hunter MD;  Location: [facility] MAIN OR;  Service: Pain Management;  Laterality: N/A;       Family History: family history includes Aneurysm in her father; Cancer in her maternal aunt; Diabetes in her sister; Diabetes type I in her half-sister; Heart attack in her sister; Heart disease in her mother; Lung cancer in her mother; No Known Problems in her brother, brother, and sister; Stroke in her mother; Thyroid cancer in her half-sister; Thyroid disease in her sister. Otherwise pertinent FHx was reviewed and not pertinent to current issue.    Social History:  reports that she has never smoked. She has never been exposed to tobacco smoke. She has never used smokeless tobacco. She reports current alcohol use. She reports that she does not use drugs.    Home Medications:  Prior to Admission Medications       Prescriptions Last Dose Informant Patient Reported? Taking?    Abemaciclib (VERZENIO) 100 mg tablet chemo tablet   No No    Take 1 tablet by mouth 2 (Two) Times a Day. Take with or without food; Swallow whole, do not crush, chew or split tablets.    Blood Glucose Monitoring Suppl (Accu-Chek Araceli) device   No No    Use as instructed   To test blood sugar bid    Calcium Carbonate-Vit D-Min (Calcium 600+D Plus Minerals) 600-400 MG-UNIT chewable tablet   No No    Chew 600 mg 2 (Two) Times a Day.    Continuous Blood Gluc  (FreeStyle Rajeev 14 Day Dubberly) device   No No    1 each Daily.    Continuous Blood Gluc Sensor (FreeStyle Rajeev 14 Day Sensor) misc   No No    1 each Daily.    cyclobenzaprine (FLEXERIL) 10 MG tablet   No No    Take 1 tablet by mouth 2 (Two) Times a Day As Needed for Muscle Spasms.    diphenoxylate-atropine (LOMOTIL) 2.5-0.025 MG per tablet   No No    Take 1 tablet by mouth 3 (Three) Times a Day.    exemestane (AROMASIN) 25 MG tablet   Yes No    Take 1 tablet by mouth Daily.    famotidine (PEPCID) 20 MG tablet   Yes No    Take 1 tablet by mouth 2 (Two) Times a Day As Needed for  "Heartburn.    fluticasone (FLONASE) 50 MCG/ACT nasal spray   Yes No    Administer 2 sprays into the nostril(s) as directed by provider.    HYDROcodone-acetaminophen (NORCO)  MG per tablet   No No    Take 1 tablet by mouth Every 4 (Four) Hours As Needed for Moderate Pain.    ibuprofen (ADVIL,MOTRIN) 800 MG tablet   No No    Take 1 tablet by mouth Every 6 (Six) Hours As Needed for Mild Pain.    insulin NPH-insulin regular (humuLIN 70/30,novoLIN 70/30) (70-30) 100 UNIT/ML injection   No No    Inject 5 Units under the skin into the appropriate area as directed Every Evening. If BS over 180    Insulin Pen Needle (B-D UF III MINI PEN NEEDLES) 31G X 5 MM misc   No No    Use to inject insulin twice daily   DX: E11.9    Insulin Syringe-Needle U-100 28G X 1/2\" 1 ML misc   No No    1 each 2 (Two) Times a Day.    losartan (Cozaar) 50 MG tablet   No No    Take 1 tablet by mouth Daily. Discontinue the lisinopril due to interaction with new chemotherapy    Morphine Sulfate, PF, 8 MG/ML solution   Yes No    8.5 mg by Intrathecal Infusion route Daily. Receives 8 mg through pain pump q 24 hrs    Naloxegol Oxalate (Movantik) 12.5 MG tablet   No No    Take 1 tablet by mouth Every Morning.    ondansetron ODT (ZOFRAN-ODT) 4 MG disintegrating tablet   No No    Place 2 tablets on the tongue Every 8 (Eight) Hours As Needed for Nausea or Vomiting.    rosuvastatin (Crestor) 20 MG tablet   No No    Take 1 tablet by mouth Every Night.              Allergies:  Allergies   Allergen Reactions    Oxycodone Nausea And Vomiting    Promethazine Other (See Comments)     Hyperactive mean    Tape Other (See Comments)     .blisters      Adhesive Tape Rash    Flexeril [Cyclobenzaprine] Rash       Objective      Vitals:   Temp:  [100.1 °F (37.8 °C)] 100.1 °F (37.8 °C)  Heart Rate:  [60-82] 66  Resp:  [19] 19  BP: (90-96)/(46-62) 93/50  Body mass index is 19.92 kg/m².  Physical Exam  Constitutional:       General: She is not in acute distress.     " Appearance: She is ill-appearing.   HENT:      Head: Normocephalic.      Nose: Nose normal.      Mouth/Throat:      Mouth: Mucous membranes are dry.      Comments: Oral mucosae dry. Poor dentition.   Eyes:      Extraocular Movements: Extraocular movements intact.      Pupils: Pupils are equal, round, and reactive to light.   Cardiovascular:      Rate and Rhythm: Normal rate and regular rhythm.      Pulses: Normal pulses.      Heart sounds: Murmur heard.   Pulmonary:      Effort: Pulmonary effort is normal. No respiratory distress.      Breath sounds: Normal breath sounds. No wheezing.   Abdominal:      General: Abdomen is flat. There is no distension.      Palpations: Abdomen is soft.      Tenderness: There is no abdominal tenderness. There is no guarding.   Musculoskeletal:      Cervical back: Neck supple.   Skin:     General: Skin is dry.      Capillary Refill: Capillary refill takes less than 2 seconds.      Coloration: Skin is pale.   Neurological:      Mental Status: She is alert and oriented to person, place, and time.   Psychiatric:         Behavior: Behavior normal.         Diagnostic Data:  Lab Results (last 24 hours)       Procedure Component Value Units Date/Time    Respiratory Panel PCR w/COVID-19(SARS-CoV-2) ELISHA/MARQUEZ/HAMMAD/PAD/COR/SHARDA In-House, NP Swab in UTM/VTM, 2 HR TAT - Swab, Nasopharynx [[id number]]  (Normal) Collected: 01/06/25 0024    Specimen: Swab from Nasopharynx Updated: 01/06/25 0130     ADENOVIRUS, PCR Not Detected     Coronavirus 229E Not Detected     Coronavirus HKU1 Not Detected     Coronavirus NL63 Not Detected     Coronavirus OC43 Not Detected     COVID19 Not Detected     Human Metapneumovirus Not Detected     Human Rhinovirus/Enterovirus Not Detected     Influenza A PCR Not Detected     Influenza B PCR Not Detected     Parainfluenza Virus 1 Not Detected     Parainfluenza Virus 2 Not Detected     Parainfluenza Virus 3 Not Detected     Parainfluenza Virus 4 Not Detected     RSV, PCR Not  Detected     Bordetella pertussis pcr Not Detected     Bordetella parapertussis PCR Not Detected     Chlamydophila pneumoniae PCR Not Detected     Mycoplasma pneumo by PCR Not Detected    Narrative:      In the setting of a positive respiratory panel with a viral infection PLUS a negative procalcitonin without other underlying concern for bacterial infection, consider observing off antibiotics or discontinuation of antibiotics and continue supportive care. If the respiratory panel is positive for atypical bacterial infection (Bordetella pertussis, Chlamydophila pneumoniae, or Mycoplasma pneumoniae), consider antibiotic de-escalation to target atypical bacterial infection.    CBC & Differential [[id number]]  (Abnormal) Collected: 01/06/25 0022    Specimen: Blood Updated: 01/06/25 0059    Narrative:      The following orders were created for panel order CBC & Differential.  Procedure                               Abnormality         Status                     ---------                               -----------         ------                     CBC Auto Differential[[id number]]        Abnormal            Final result               Scan Slide[[id number]]                                       Final result                 Please view results for these tests on the individual orders.    CBC Auto Differential [[id number]]  (Abnormal) Collected: 01/06/25 0022    Specimen: Blood Updated: 01/06/25 0059     WBC 11.40 10*3/mm3      RBC 4.13 10*6/mm3      Hemoglobin 12.6 g/dL      Hematocrit 37.8 %      MCV 91.5 fL      MCH 30.5 pg      MCHC 33.3 g/dL      RDW 14.5 %      RDW-SD 48.8 fl      MPV 11.0 fL      Platelets 52 10*3/mm3     Scan Slide [[id number]] Collected: 01/06/25 0022    Specimen: Blood Updated: 01/06/25 0059     Scan Slide --     Comment: See Manual Differential Results       Manual Differential [[id number]]  (Abnormal) Collected: 01/06/25 0022    Specimen: Blood Updated: 01/06/25 0059     Neutrophil % 80.0 %       Lymphocyte % 1.0 %      Monocyte % 1.0 %      Bands %  14.0 %      Metamyelocyte % 2.0 %      Atypical Lymphocyte % 2.0 %      Neutrophils Absolute 10.72 10*3/mm3      Lymphocytes Absolute 0.34 10*3/mm3      Monocytes Absolute 0.11 10*3/mm3      Dacrocytes Slight/1+     Poikilocytes Slight/1+     RBC Fragments Slight/1+     WBC Morphology Normal     Platelet Estimate Decreased     Large Platelets Slight/1+     Giant Platelets Slight/1+    Urinalysis, Microscopic Only - Straight Cath [[id number]]  (Abnormal) Collected: 01/06/25 0035    Specimen: Urine from Straight Cath Updated: 01/06/25 0049     RBC, UA 3-5 /HPF      WBC, UA 21-50 /HPF      Bacteria, UA 1+ /HPF      Squamous Epithelial Cells, UA 13-20 /HPF      Transitional Epithelial Cells, UA 3-6 /HPF      Hyaline Casts, UA 0-2 /LPF      Methodology Automated Microscopy    Urine Culture - Urine, Straight Cath [[id number]] Collected: 01/06/25 0035    Specimen: Urine from Straight Cath Updated: 01/06/25 0049    Comprehensive Metabolic Panel [[id number]]  (Abnormal) Collected: 01/06/25 0022    Specimen: Blood Updated: 01/06/25 0048     Glucose 117 mg/dL      BUN 56 mg/dL      Creatinine 2.05 mg/dL      Sodium 135 mmol/L      Potassium 3.4 mmol/L      Comment: Slight hemolysis detected by analyzer. Result may be falsely elevated.        Chloride 100 mmol/L      CO2 19.5 mmol/L      Calcium 9.0 mg/dL      Total Protein 6.6 g/dL      Albumin 3.2 g/dL      ALT (SGPT) 24 U/L      AST (SGOT) 46 U/L      Alkaline Phosphatase 64 U/L      Total Bilirubin 1.4 mg/dL      Globulin 3.4 gm/dL      A/G Ratio 0.9 g/dL      BUN/Creatinine Ratio 27.3     Anion Gap 15.5 mmol/L      eGFR 27.0 mL/min/1.73     Narrative:      GFR Categories in Chronic Kidney Disease (CKD)      GFR Category          GFR (mL/min/1.73)    Interpretation  G1                     90 or greater         Normal or high (1)  G2                      60-89                Mild decrease (1)  G3a                   45-59                 Mild to moderate decrease  G3b                   30-44                Moderate to severe decrease  G4                    15-29                Severe decrease  G5                    14 or less           Kidney failure          (1)In the absence of evidence of kidney disease, neither GFR category G1 or G2 fulfill the criteria for CKD.    eGFR calculation 2021 CKD-EPI creatinine equation, which does not include race as a factor    Urinalysis With Culture If Indicated - Straight Cath [[id number]]  (Abnormal) Collected: 01/06/25 0035    Specimen: Urine from Straight Cath Updated: 01/06/25 0045     Color, UA Dark Yellow     Appearance, UA Cloudy     pH, UA <=5.0     Specific Gravity, UA 1.025     Comment: Result obtained by Refractometer        Glucose,  mg/dL (Trace)     Ketones, UA 15 mg/dL (1+)     Bilirubin, UA Large (3+)     Comment: Confirmation testing is unavailable.  A serum bilirubin is recommended for further assessment.        Blood, UA Negative     Protein, UA >=300 mg/dL (3+)     Leuk Esterase, UA Small (1+)     Nitrite, UA Negative     Urobilinogen, UA 1.0 E.U./dL    Narrative:      In absence of clinical symptoms, the presence of pyuria, bacteria, and/or nitrites on the urinalysis result does not correlate with infection.    POC Lactate [[id number]]  (Normal) Collected: 01/06/25 0037    Specimen: Blood Updated: 01/06/25 0040     Lactate 1.5 mmol/L      Comment: Serial Number: [id number]:  [id number]       Blood Culture - Blood, Arm, Left [[id number]] Collected: 01/06/25 0032    Specimen: Blood from Arm, Left Updated: 01/06/25 0039    Blood Culture - Blood, Arm, Right [[id number]] Collected: 01/06/25 0033    Specimen: Blood from Arm, Right Updated: 01/06/25 0039    Tasley Draw [[id number]] Collected: 01/06/25 0022    Specimen: Blood Updated: 01/06/25 0031    Narrative:      The following orders were created for panel order Tasley Draw.  Procedure                                Abnormality         Status                     ---------                               -----------         ------                     Green Top (Gel)[[id number]]                                  Final result               Lavender Top[[id number]]                                     Final result               Gold Top - SST[[id number]]                                   Final result               Light Blue Top[[id number]]                                   Final result                 Please view results for these tests on the individual orders.    Green Top (Gel) [[id number]] Collected: 01/06/25 0022    Specimen: Blood Updated: 01/06/25 0031     Extra Tube Hold for add-ons.     Comment: Auto resulted.       Lavender Top [[id number]] Collected: 01/06/25 0022    Specimen: Blood Updated: 01/06/25 0031     Extra Tube hold for add-on     Comment: Auto resulted       Gold Top - SST [[id number]] Collected: 01/06/25 0022    Specimen: Blood Updated: 01/06/25 0031     Extra Tube Hold for add-ons.     Comment: Auto resulted.       Light Blue Top [[id number]] Collected: 01/06/25 0022    Specimen: Blood Updated: 01/06/25 0031     Extra Tube Hold for add-ons.     Comment: Auto resulted                Imaging Results (Last 24 Hours)       Procedure Component Value Units Date/Time    CT Cervical Spine Without Contrast [[id number]] Collected: 01/06/25 0158     Updated: 01/06/25 0206    Narrative:      CT CERVICAL SPINE WO CONTRAST    Date of Exam: 1/6/2025 1:09 AM EST    Indication: Neck pain after fall.    Comparison: 3/29/2023    Technique: Axial CT images were obtained of the cervical spine without contrast administration.  Sagittal and coronal reconstructions were performed.  Automated exposure control and iterative reconstruction methods were used.    Findings:  Patient is fused from C3-C7 with an anterior plate and screws from C3-C6. Cervical alignment is normal. There is multilevel facet arthropathy. There is disc space narrowing at  C7-T1. There is scoliosis at the cervicothoracic junction. No acute cervical   spine fracture is identified. Paraspinal soft tissues are grossly normal.      Impression:      No acute cervical spine fracture. No significant interval change.      Electronically Signed: Brad Foster MD    1/6/2025 2:04 AM EST    Workstation ID: [id number]    CT Facial Bones Without Contrast [[id number]] Collected: 01/06/25 0154     Updated: 01/06/25 0200    Narrative:      CT FACIAL BONES WO CONTRAST    Date of Exam: 1/6/2025 1:09 AM EST    Indication: Fall with bruising.    Comparison: CT orbit 10/12/2018    Technique: Axial CT images were obtained from the inferior aspect of the mandible through the frontal sinuses without contrast administration.  Sagittal and coronal reconstructions were performed.  Automated exposure control and iterative reconstruction   methods were used.    Findings:  Temporomandibular joints demonstrate normal alignment. The mandible is intact. No acute facial bone fracture. No evidence of acute sinus disease. The globes and orbits appear grossly normal.      Impression:      Negative facial bone CT.        Electronically Signed: Brad Foster MD    1/6/2025 1:58 AM EST    Workstation ID: [id number]    CT Abdomen Pelvis Without Contrast [[id number]] Collected: 01/06/25 0144     Updated: 01/06/25 0158    Narrative:      CT ABDOMEN PELVIS WO CONTRAST    Date of Exam: 1/6/2025 1:09 AM EST    Indication: lower abdominal pain, diarrhea.    Comparison: CT abdomen pelvis August 5, 2024    Technique: Axial CT images were obtained of the abdomen and pelvis without the administration of contrast. Sagittal and coronal reconstructions were performed.  Automated exposure control and iterative reconstruction methods were used.    Findings:  Lung Bases:     There is mild right middle lobe atelectasis. There is bilateral basilar atelectasis. The heart is enlarged. There are postsurgical changes of the pulmonary artery. Heavy  coronary artery calcifications are seen.    Liver:  The liver is of normal size. There are no focal liver lesions with noncontrast technique.    Biliary/Gallbladder:    Layering density in the gallbladder may be stones or sludge. The biliary tree is normal.    Spleen:  There is mild splenomegaly.    Pancreas:    Pancreas is normal. There is no evidence of pancreatic mass or peripancreatic fluid.    Kidneys:    There is a nonobstructing stone of the left kidney. There is no hydronephrosis of either kidney.    Adrenals:    Adrenal glands are unremarkable.    Retroperitoneal/Lymph Nodes/Vasculature:    No retroperitoneal adenopathy is identified.    Gastrointestinal/Mesentery:    The bowel loops are non-dilated without wall thickening or mass. The appendix appears within normal limits. No evidence of obstruction. No free air. No mesenteric fluid collections identified.    Bladder:    The bladder is normal.    Genital:     Unremarkable          Bony Structures:     There are old compression fractures of T12 and L1. A benign hemangioma of L4 is unchanged. There is no acute fracture.        Impression:      Impression:  1.No acute abdominal or pelvic abnormality.  2.Mild splenomegaly.  3.Nonobstructing left renal stone.  4.Layering density in the gallbladder may be stones or sludge.                Electronically Signed: Rocky Heredia MD    1/6/2025 1:56 AM EST    Workstation ID: [id number]    CT Head Without Contrast [[id number]] Collected: 01/06/25 0148     Updated: 01/06/25 0156    Narrative:      CT HEAD WO CONTRAST    HISTORY:  Head injury from fall. Bruising.    TECHNIQUE:  Axial unenhanced head CT with coronal reformats. Radiation dose reduction techniques included automated exposure control or exposure modulation based on body size.    COMPARISON:  11/2/2024, 11/5/2023    FINDINGS:  Ventricular size and configuration are normal. No acute infarct or hemorrhage is identified. There are no masses. There is no skull  fracture. Atherosclerotic calcifications are present in the vertebral arteries and carotid siphons. Mild chronic small   vessel ischemic changes are present in the white matter.      Impression:      Senescent changes without acute abnormality.          Electronically Signed: Brad Foster MD    1/6/2025 1:54 AM EST    Workstation ID: [id number]    XR Chest 1 View [[id number]] Collected: 01/06/25 0036     Updated: 01/06/25 0040    Narrative:      XR CHEST 1 VW    Date of Exam: 1/6/2025 12:20 AM EST    Indication: cough, soa    Comparison: 11/2/2024    Findings:  There is a small stent graft in the proximal descending aorta. The heart remains enlarged status post CABG and valve repair. Left-sided multipolar pacer is present. Patient is status post mastectomy with bilateral breast reconstructions. Patient is also   status post cervical spinal fusion. No acute infiltrates are identified. No pneumothorax.      Impression:      1.Cardiomegaly with postoperative changes of CABG and valve repair.  2.No acute infiltrates identified.        Electronically Signed: Brad Foster MD    1/6/2025 12:38 AM EST    Workstation ID: [id number]              Assessment & Plan        This is a 64 y.o. female with:    Active and Resolved Problems  Active Hospital Problems    Diagnosis  POA    **JOSE (acute kidney injury) [N17.9]  Yes      Resolved Hospital Problems   No resolved problems to display.       Hypotension due to volume depletion  Diarrheal illness  Oliguric JOSE due to hypovolemia on the basis of diarrhea and vomiting  Melena by history  S/P 30 ml/kg IVF NS bolus in ED, remained borderline hypotensive and received 1 L NS  Given IV albumin 25 g once  Continue  ml/hr  Pending C diff testing, enteric panel  Will continue IV ceftriaxone and flagyl  Serial bladder scans, checking renal US R/o obstructive pathology  Holding losartan due to JOSE  heme-negative stool, no overt GI bleed on exam, Hb stable. Monitor for bleeding  Will  start CLD, advance diet as she tolerates  GI consultation    Possible UTI  UA on admission suggestive of contamination  Continue ceftriaxone       History breast cancer with bone metastasis  Thrombocytopenia  Was on Arimidex in the past was discontinued due to osteoporosis  Had intolerance to tamoxifen  She is currently on Abemeciclib outpatient, this has been considered to have alternate therapies given frequent UTIs as per pharmacy note on 12/10  Patient can follow outpatient with her primary oncologist Dr. Nunez to discuss alternative therapies  Monitor daily CBCs, monitor platelets  Continue Norco  mg q 4 PRN for moderate pain  Continue with intrathecal pump      History of complete heart block S/P PPM    VTE Prophylaxis:  Pharmacologic VTE prophylaxis orders are present.        The patient desires to be as follows:    CODE STATUS:       Full code    Lavon Garrison, who can be contacted at , is the designated person to make medical decisions on the patient's behalf if She is incapable of doing so. This was clarified with patient and/or next of kin on 2025 during the course of this H&P.    Admission Status:  I believe this patient meets inpatient status.    Expected Length of Stay: 2-3    PDMP and Medication Dispenses via Sidebar reviewed and consistent with patient reported medications.    I discussed the patient's findings and my recommendations with patient and nursing staff.      Signature:     This document has been electronically signed by Carlos Llanes Alvarez, MD on 2025 04:30 [facility] Hospitalist Team     Electronically signed by Llanes Alvarez, Carlos, MD at 25 0527     Saira Vasquez, PT   Physical Therapist  Physical Therapy  Therapy Evaluation     Signed  Date of Service:  25 1446  Creation Time:  25     Signed        Expand All Collapse All  Patient Name: Gladys Garrison                 : 1960                          MRN:  [id number]                              Today's Date: 1/9/2025                                     Admit Date: 1/5/2025               Visit Dx:   Visit Diagnosis       ICD-10-CM ICD-9-CM   1. Sepsis, due to unspecified organism, unspecified whether acute organ dysfunction present  A41.9 038.9       995.91   2. JOSE (acute kidney injury)  N17.9 584.9   3. Acute abdominal pain  R10.9 789.00       338.19   4. Diarrhea of presumed infectious origin  R19.7 009.3   5. Injury of head, initial encounter  S09.90XA 959.01   6. Contusion of face, initial encounter  S00.83XA 920   7. Neck sprain, initial encounter  S13.9XXA 847.0         Problem List       Patient Active Problem List   Diagnosis    Hyperlipidemia    Hypertensive disorder    Osteoarthritis of multiple joints    Pulmonary hypertension    Pulmonary valve disorder    Malignant tumor of breast    Tetralogy of Fallot    Tricuspid valve regurgitation    Controlled type 2 diabetes mellitus without complication, with long-term current use of insulin    Vitamin D deficiency    Acquired spondylolisthesis of cervical vertebra    Adjacent segment disease with spinal stenosis    Cervical spondylosis with myelopathy    Cervical myelopathy with cervical radiculopathy    Osteoporosis    S/P cervical spinal fusion    Lesion of lumbar spine    Atherosclerosis of coronary artery of native heart without angina pectoris    Rib pain on right side    Memory loss of unknown cause    Malignant neoplasm of female breast    Malignant neoplasm metastatic to bone    Supraventricular tachycardia    Thrombocytopenia    Systolic congestive heart failure    COVID-19    Elevated AST (SGOT)    Hyponatremia    Pacemaker    Sick sinus syndrome    AV block, complete    Cancer associated pain    Dyspnea    Pain in left knee    Abnormal radiographic examination    Acute sinusitis    Allergic rhinitis    Constipation    Diarrhea    Nausea    Disorder of aorta    Dizziness and giddiness    Headache     Lack of energy    Syncope    Pacemaker complications    Fall against object    Complete heart block    Acquired trigger finger of left middle finger    Contusion of left wrist    JOSE (acute kidney injury)         Medical History        Past Medical History:   Diagnosis Date    Allergic      Bone cancer       METASTATIC BONE; stage 4    Bradycardia       SECONDARY TO ABLATION    Breast cancer 2017     mets to lymph nodes; did not do radiation    Cancer of unknown origin      Compression fx, thoracic spine, open, initial encounter      Coronary artery disease      COVID-19 2021    Diabetes mellitus      Heart disease, unspecified      Hepatitis C       RESOLVED WITH MEDICATION    Hyperlipidemia      Hypertension      Obesity (BMI 30-39.9) 2021    Rib fracture       Per patient    Sleep apnea       no machine    Tachycardia       ATRIAL    Type 2 diabetes mellitus 2017         Surgical History         Past Surgical History:   Procedure Laterality Date    BACK SURGERY         neck X 2    BREAST RECONSTRUCTION Bilateral      CARDIAC ABLATION          atrial tachycardia x 5 ablations     CARDIAC CATHETERIZATION        CARDIAC ELECTROPHYSIOLOGY PROCEDURE N/A 2023     Procedure: Pacemaker RV lead insertion with generator change out. Tubist[employer];  Surgeon: Steven Hammond MD;  Location: [facility] CATH INVASIVE LOCATION;  Service: Cardiovascular;  Laterality: N/A;    CARDIAC SURGERY         stent placed in aorta    CARDIAC SURGERY         6 surgeries as baby     CERVICAL FUSION ANTERIOR WITH ARTIFICIAL DISCECTOMY IMPLANTATION N/A 2020     Procedure: C4 VERTEBRECTOMY AND ANTERIOR CERIVCAL DISCECTOMY WITH FUSION OF CERVICAL THREE THROUGH FIVE WITH REMOVAL OF HARDWARE C5-C6;  Surgeon: Mark Kaba MD;  Location: [facility] MAIN OR;  Service: Neurosurgery;  Laterality: N/A;     SECTION         x2    COLONOSCOPY N/A 10/23/2020     Procedure: COLONOSCOPY WITH POLYPECTOMY X6;  Surgeon:  Stanley Bourne MD;  Location: [facility] ENDOSCOPY;  Service: Gastroenterology;  Laterality: N/A;  POLYPS, INTERNAL HEMORRHOIDS    ENDOMETRIAL ABLATION         REMOVAL SCAR TISSUE UTERINE    ENDOSCOPY N/A 10/23/2020     Procedure: ESOPHAGOGASTRODUODENOSCOPY WITH BIOPSY X 1 AREA;  Surgeon: Stanley Bourne MD;  Location: [facility] ENDOSCOPY;  Service: Gastroenterology;  Laterality: N/A;  GASTRITIS, ESOPHAGITIS, HIATAL HERNIA    INSERT / REPLACE / REMOVE PACEMAKER        KNEE SURGERY   2000    MASTECTOMY Bilateral      NECK SURGERY        PACEMAKER IMPLANTATION        PAIN PUMP INSERTION/REVISION N/A 04/05/2022     Procedure: PAIN PUMP INSERTION AND INTRATHECAL CATHETER PLACEMENT;  Surgeon: Chuck Hunter MD;  Location: [facility] MAIN OR;  Service: Pain Management;  Laterality: N/A;           General Information         Row Name 01/09/25 1425                 Physical Therapy Time and Intention     Document Type evaluation  -BR       Mode of Treatment physical therapy  -BR          Row Name 01/09/25 1425                 General Information     Patient Profile Reviewed yes  -BR       Prior Level of Function independent:;all household mobility;community mobility;gait;transfer  Pt was working at [employer] 2 weeks ago.  -BR       Existing Precautions/Restrictions fall;cardiac  -BR       Barriers to Rehab medically complex  -BR          Row Name 01/09/25 1425                 Living Environment     People in Home significant other;other relative(s)  -BR          Row Name 01/09/25 1425                 Home Main Entrance     Number of Stairs, Main Entrance one  -BR          Row Name 01/09/25 1425                 Stairs Within Home, Primary     Stairs, Within Home, Primary Pt stays in the basement of her home.  -BR       Number of Stairs, Within Home, Primary twelve  -BR          Row Name 01/09/25 1428                 Cognition     Orientation Status (Cognition) oriented x 4  -BR          Row Name 01/09/25 1427                  Safety Issues/Impairments Affecting Functional Mobility     Impairments Affecting Function (Mobility) endurance/activity tolerance;balance;strength  -BR                       User Key  (r) = Recorded By, (t) = Taken By, (c) = Cosigned By        Initials Name Provider Type     Saira Perez PT Physical Therapist                            Mobility         Row Name 01/09/25 1427                 Bed Mobility     Bed Mobility supine-sit  -BR       Supine-Sit [place] (Bed Mobility) verbal cues;minimum assist (75% patient effort)  -BR       Assistive Device (Bed Mobility) bed rails  -BR       Comment, (Bed Mobility) Pt was impulsive and needed cues to slow down and for safety.  -BR          Row Name 01/09/25 1427                 Sit-Stand Transfer     Sit-Stand [place] (Transfers) minimum assist (75% patient effort);verbal cues  -BR       Assistive Device (Sit-Stand Transfers) walker, front-wheeled  -BR          Row Name 01/09/25 1427                 Gait/Stairs (Locomotion)     [place] Level (Gait) minimum assist (75% patient effort)  -BR       Assistive Device (Gait) walker, front-wheeled  -BR       Patient was able to Ambulate yes  -BR       Distance in Feet (Gait) 45  -BR       Bilateral Gait Deviations forward flexed posture;heel strike decreased  -BR                       User Key  (r) = Recorded By, (t) = Taken By, (c) = Cosigned By        Initials Name Provider Type     Saira Perez PT Physical Therapist                            Obj/Interventions         Row Name 01/09/25 1428                 Range of Motion Comprehensive     General Range of Motion bilateral lower extremity ROM WFL  -BR          Row Name 01/09/25 1428                 Strength Comprehensive (MMT)     Comment, General Manual Muscle Testing (MMT) Assessment BLE strength grossly 3-/5  -BR          Row Name 01/09/25 1428                 Balance     Balance Assessment sitting static balance;standing static  balance;sitting dynamic balance  -BR       Static Sitting Balance supervision  -BR       Dynamic Sitting Balance contact guard  -BR       Position, Sitting Balance unsupported;sitting edge of bed  -BR       Static Standing Balance minimal assist  -BR       Position/Device Used, Standing Balance supported;walker, rolling  -BR          Row Name 01/09/25 1428                 Sensory Assessment (Somatosensory)     Sensory Assessment (Somatosensory) LE sensation intact  -BR                       User Key  (r) = Recorded By, (t) = Taken By, (c) = Cosigned By        Initials Name Provider Type     BR Saira Vasquez, PT Physical Therapist                            Goals/Plan         Row Name 01/09/25 1444                 Bed Mobility Goal 1 (PT)     Activity/Assistive Device (Bed Mobility Goal 1, PT) bed mobility activities, all  -BR       [place] Level/Cues Needed (Bed Mobility Goal 1, PT) modified independence  -BR       Time Frame (Bed Mobility Goal 1, PT) long term goal (LTG);2 weeks  -BR          Row Name 01/09/25 1444                 Transfer Goal 1 (PT)     Activity/Assistive Device (Transfer Goal 1, PT) transfers, all  -BR       [place] Level/Cues Needed (Transfer Goal 1, PT) modified independence  -BR       Time Frame (Transfer Goal 1, PT) long term goal (LTG);2 weeks  -BR          Row Name 01/09/25 1444                 Gait Training Goal 1 (PT)     Activity/Assistive Device (Gait Training Goal 1, PT) gait (walking locomotion)  -BR       [place] Level (Gait Training Goal 1, PT) modified independence  -BR       Distance (Gait Training Goal 1, PT) 150  -BR       Time Frame (Gait Training Goal 1, PT) long term goal (LTG);2 weeks  -BR          Row Name 01/09/25 1445                 Therapy Assessment/Plan (PT)     Planned Therapy Interventions (PT) balance training;bed mobility training;gait training;patient/family education;transfer training;neuromuscular re-education;ROM (range of motion);stair  training;strengthening  -BR                    User Key  (r) = Recorded By, (t) = Taken By, (c) = Cosigned By        Initials Name Provider Type     BR Saira Vasquez, PT Physical Therapist                         Clinical Impression         Row Name 01/09/25 1429                 Pain     Pretreatment Pain Rating 3/10  -BR       Posttreatment Pain Rating 3/10  -BR       Pain Side/Orientation generalized  -BR          Row Name 01/09/25 1444 01/09/25 1429            Plan of Care Review     Plan of Care Reviewed With -- patient  -BR     Outcome Evaluation Pt presents as a 65 y/o f admitted to [facility] on 1/5/25 with diarrhea, nausea and generalized weakness. Pt has JOSE and thrombocytopenia and bacteremia. Pt has hx metastatic breast cancer diagnosed 2017 with bone mets diagnosed in 2021, history of bilateral mastectomy, HTN, OA, pulmonary hypertension, tetralogy of fallot, DM, OP, disorder of spine, pacemaker. Pt had a fall 2 days ago and she hit her face. CT head (-). CT C-spine (-). CT facial bones (-). CXR (-). Pt A and O x 4 and anxious. Pt removing her leads impulsively to get to bathroom. At baseline, pt lives with ex-spouse and other family and uses no AD for independence with household mobility. She was working at [employer] up to 2 weeks ago. Pt reports she lives in the basement and goes upstairs to cook and clean. This date, pt requires min assist for bed mobility and transfers. She ambulated 45 feet with rolling walker with min assist of 1. Pt required mod assist to ambulate without the walker. Patient is a high risk of injurious falls and unsafe to return to prior living environment at this time.  PT will follow pt as she is below her baseline for mobility with generalized weakness, standing balance deficits and decreased functional activity tolerance. PT recommends pt to have OT evaluation as well- hospitalist will be notified. PT recommendation at this time is Skilled Nursing Facility pending pt progress.   -BR --        Row Name 01/09/25 1429                 Therapy Assessment/Plan (PT)     Rehab Potential (PT) good  -BR       Criteria for Skilled Interventions Met (PT) yes;meets criteria;skilled treatment is necessary  -BR       Therapy Frequency (PT) 5 times/wk  -BR       Predicted Duration of Therapy Intervention (PT) until D/c  -BR          Row Name 01/09/25 1429                 Vital Signs     Pre Systolic BP Rehab 144  -BR       Pre Treatment Diastolic BP 72  -BR       Pretreatment Heart Rate (beats/min) 67  -BR       Pre SpO2 (%) 97  -BR       O2 Delivery Pre Treatment nasal cannula  2L  -BR       O2 Delivery Intra Treatment room air  -BR       Post SpO2 (%) 97  -BR       O2 Delivery Post Treatment nasal cannula  2L  -BR       Pre Patient Position Supine  -BR       Intra Patient Position Standing  -BR       Post Patient Position Sitting  -BR          Row Name 01/09/25 1429                 Positioning and Restraints     Pre-Treatment Position in bed  -BR       Post Treatment Position chair  -BR       In Chair notified nsg;reclined;call light within reach;encouraged to call for assist;exit alarm on;legs elevated  -BR                       User Key  (r) = Recorded By, (t) = Taken By, (c) = Cosigned By        Initials Name Provider Type     BR Saira Vasquez, PT Physical Therapist                            Outcome Measures         Row Name 01/09/25 1444                 How much help from another person do you currently need...     Turning from your back to your side while in flat bed without using bedrails? 3  -BR       Moving from lying on back to sitting on the side of a flat bed without bedrails? 2  -BR       Moving to and from a bed to a chair (including a wheelchair)? 3  -BR       Standing up from a chair using your arms (e.g., wheelchair, bedside chair)? 3  -BR       Climbing 3-5 steps with a railing? 1  -BR       To walk in hospital room? 3  -BR       AM-PAC 6 Clicks Score (PT) 15  -BR       Highest  Level of Mobility Goal 4 --> Transfer to chair/commode  -BR          Row Name 01/09/25 1444                 Functional Assessment     Outcome Measure Options AM-PAC 6 Clicks Basic Mobility (PT)  -BR                       User Key  (r) = Recorded By, (t) = Taken By, (c) = Cosigned By        Initials Name Provider Type     BR Saira Vasquez PT Physical Therapist                          Physical Therapy Education            Title: PT OT SLP Therapies (Done)         Topic: Physical Therapy (Done)         Point: Mobility training (Done)         Learning Progress Summary             Patient Acceptance, E,D, VU,DU by BR at 1/9/2025 1445                            Point: Body mechanics (Done)         Learning Progress Summary             Patient Acceptance, E,D, VU,DU by BR at 1/9/2025 1445                            Point: Precautions (Done)         Learning Progress Summary             Patient Acceptance, E,D, VU,DU by BR at 1/9/2025 1445                                                User Key         Initials Effective Dates Name Provider Type Discipline      02/01/22 -  Saira Vasquez PT Physical Therapist PT                          PT Recommendation and Plan  Planned Therapy Interventions (PT): balance training, bed mobility training, gait training, patient/family education, transfer training, neuromuscular re-education, ROM (range of motion), stair training, strengthening  Outcome Evaluation: Pt presents as a 65 y/o f admitted to [facility] on 1/5/25 with diarrhea, nausea and generalized weakness. Pt has JOSE and thrombocytopenia and bacteremia. Pt has hx metastatic breast cancer diagnosed 2017 with bone mets diagnosed in 2021, history of bilateral mastectomy, HTN, OA, pulmonary hypertension, tetralogy of fallot, DM, OP, disorder of spine, pacemaker. Pt had a fall 2 days ago and she hit her face. CT head (-). CT C-spine (-). CT facial bones (-). CXR (-). Pt A and O x 4 and anxious. Pt removing her leads  impulsively to get to bathroom. At baseline, pt lives with ex-spouse and other family and uses no AD for independence with household mobility. She was working at [employer] up to 2 weeks ago. Pt reports she lives in the basement and goes upstairs to cook and clean. This date, pt requires min assist for bed mobility and transfers. She ambulated 45 feet with rolling walker with min assist of 1. Pt required mod assist to ambulate without the walker. Patient is a high risk of injurious falls and unsafe to return to prior living environment at this time.  PT will follow pt as she is below her baseline for mobility with generalized weakness, standing balance deficits and decreased functional activity tolerance. PT recommends pt to have OT evaluation as well- hospitalist will be notified. PT recommendation at this time is Skilled Nursing Facility pending pt progress.      Time Calculation:        PT Charges         Row Name 01/09/25 1445                       Time Calculation     Start Time 1350  -BR         Stop Time 1430  -BR         Time Calculation (min) 40 min  -BR         PT Received On 01/09/25  -BR         PT - Next Appointment 01/10/25  -BR         PT Goal Re-Cert Due Date 01/23/25  -BR                 Time Calculation- PT     Total Timed Code Minutes- PT 15 minute(s)  -BR                      User Key  (r) = Recorded By, (t) = Taken By, (c) = Cosigned By        Initials Name Provider Type     Saira Perez PT Physical Therapist                       Therapy Charges for Today         Code Description Service Date Service Provider Modifiers Qty     85075578978 HC PT EVAL MOD COMPLEXITY 4 1/9/2025 Saira Vasquez, PT GP 1     07180765687 HC GAIT TRAINING EA 15 MIN 1/9/2025 Saira Vasquez, PT GP 1                PT G-Codes  Outcome Measure Options: AM-PAC 6 Clicks Basic Mobility (PT)  AM-PAC 6 Clicks Score (PT): 15  PT Discharge Summary  Anticipated Discharge Disposition (PT): skilled nursing facility    "  Saira Vasquez PT                        2025                                      Saira Vasquez, PT   Physical Therapist  Physical Therapy  Therapy Treatment Note     Signed  Date of Service:  01/10/25 1242  Creation Time:  01/10/25 1242     Signed        Subjective: Pt agreeable to therapeutic plan of care.     Objective:      Precautions - medically complex     Bed mobility - CGA with cues for safety  Transfers - CGA and with rolling walker  Ambulation - 50 feet CGA, Min-A, and with rolling walker     Vitals: WNL on room air     Pain: 5 VAS   Location: generalized  Intervention for pain: Repositioned and Increased Activity     Education: Provided education on the importance of mobility in the acute care setting, Verbal/Tactile Cues, Transfer Training, and Gait Training     Assessment: Gladys Garrison presents with functional mobility impairments which indicate the need for skilled intervention. Pt has infection in her blood and her per RN her care is medically complex. Pt needs cues for safety and to stay centered in her rolling walker. Pt has generalized weakness and she is below her baseline for all mobility. Tolerating session today without incident. Will continue to follow and progress as tolerated.      Plan/Recommendations:   If medically appropriate, Moderate Intensity Therapy recommended post-acute care. This is recommended as therapy feels the patient would require 3-4 days per week and wouldn't tolerate \"3 hour daily\" rehab intensity. SNF would be the preferred choice. If the patient does not agree to SNF, arrange HH or OP depending on home bound status. If patient is medically complex, consider LTACH. Pt requires no DME at discharge.      Pt desires Skilled Rehab placement at discharge. Pt cooperative; agreeable to therapeutic recommendations and plan of care.         Basic Mobility 6-click:  Rollin = Total, A lot = 2, A little = 3; 4 = None  Supine>Sit:     "                  1 = Total, A lot = 2, A little = 3; 4 = None   Sit>Stand with arms:       1 = Total, A lot = 2, A little = 3; 4 = None  Bed>Chair:                      1 = Total, A lot = 2, A little = 3; 4 = None  Ambulate in room:           1 = Total, A lot = 2, A little = 3; 4 = None  3-5 Steps with railin = Total, A lot = 2, A little = 3; 4 = None  Score: 16     Modified [place]: N/A = No pre-op stroke/TIA     Post-Tx Position: Supine with HOB Elevated, Alarms activated, and Call light and personal items within reach  PPE: gloves     Therapy Charges for Today         Code Description Service Date Service Provider Modifiers Qty     43954317242 HC PT EVAL MOD COMPLEXITY 4 2025 Saira Vasquez, PT GP 1     20788683278 HC GAIT TRAINING EA 15 MIN 2025 Saira Vasquez, PT GP 1     27928670656 HC GAIT TRAINING EA 15 MIN 1/10/2025 Saira Vasquez, PT GP 1     78963971944 HC PT NEUROMUSC RE EDUCATION EA 15 MIN 1/10/2025 Saira Vasquez, PT GP 1               PT Charges         Row Name 01/10/25 1240                       Time Calculation     Start Time 1052  -BR         Stop Time 1118  -BR         Time Calculation (min) 26 min  -BR         PT Received On 01/10/25  -BR         PT - Next Appointment 25  -BR                 Time Calculation- PT     Total Timed Code Minutes- PT 26 minute(s)  -BR                      User Key  (r) = Recorded By, (t) = Taken By, (c) = Cosigned By        Initials Name Provider Type     Saira Perez, PT Physical Therapist                                     Servando Sanchez OT   Occupational Therapist  Specialty:  Occupational Therapy  Therapy Evaluation     Signed  Date of Service:  01/10/25 125  Creation Time:  01/10/25 1252     Signed        Expand All Collapse All  Patient Name: Gladys Garrison                 : 1960                          MRN: [id number]                              Today's Date: 1/10/2025                                    Admit Date: 1/5/2025               Visit Dx:   Visit Diagnosis       ICD-10-CM ICD-9-CM   1. Sepsis, due to unspecified organism, unspecified whether acute organ dysfunction present  A41.9 038.9       995.91   2. JOSE (acute kidney injury)  N17.9 584.9   3. Acute abdominal pain  R10.9 789.00       338.19   4. Diarrhea of presumed infectious origin  R19.7 009.3   5. Injury of head, initial encounter  S09.90XA 959.01   6. Contusion of face, initial encounter  S00.83XA 920   7. Neck sprain, initial encounter  S13.9XXA 847.0         Problem List       Patient Active Problem List   Diagnosis    Hyperlipidemia    Hypertensive disorder    Osteoarthritis of multiple joints    Pulmonary hypertension    Pulmonary valve disorder    Malignant tumor of breast    Tetralogy of Fallot    Tricuspid valve regurgitation    Controlled type 2 diabetes mellitus without complication, with long-term current use of insulin    Vitamin D deficiency    Acquired spondylolisthesis of cervical vertebra    Adjacent segment disease with spinal stenosis    Cervical spondylosis with myelopathy    Cervical myelopathy with cervical radiculopathy    Osteoporosis    S/P cervical spinal fusion    Lesion of lumbar spine    Atherosclerosis of coronary artery of native heart without angina pectoris    Rib pain on right side    Memory loss of unknown cause    Malignant neoplasm of female breast    Malignant neoplasm metastatic to bone    Supraventricular tachycardia    Thrombocytopenia    Systolic congestive heart failure    COVID-19    Elevated AST (SGOT)    Hyponatremia    Pacemaker    Sick sinus syndrome    AV block, complete    Cancer associated pain    Dyspnea    Pain in left knee    Abnormal radiographic examination    Acute sinusitis    Allergic rhinitis    Constipation    Diarrhea    Nausea    Disorder of aorta    Dizziness and giddiness    Headache    Lack of energy    Syncope    Pacemaker complications    Fall against object    Complete heart block     Acquired trigger finger of left middle finger    Contusion of left wrist    JOSE (acute kidney injury)         Medical History        Past Medical History:   Diagnosis Date    Allergic      Bone cancer       METASTATIC BONE; stage 4    Bradycardia       SECONDARY TO ABLATION    Breast cancer      mets to lymph nodes; did not do radiation    Cancer of unknown origin      Compression fx, thoracic spine, open, initial encounter      Coronary artery disease      COVID-19 2021    Diabetes mellitus      Heart disease, unspecified      Hepatitis C       RESOLVED WITH MEDICATION    Hyperlipidemia      Hypertension      Obesity (BMI 30-39.9) 2021    Rib fracture       Per patient    Sleep apnea       no machine    Tachycardia       ATRIAL    Type 2 diabetes mellitus 2017         Surgical History         Past Surgical History:   Procedure Laterality Date    BACK SURGERY         neck X 2    BREAST RECONSTRUCTION Bilateral      CARDIAC ABLATION          atrial tachycardia x 5 ablations     CARDIAC CATHETERIZATION        CARDIAC ELECTROPHYSIOLOGY PROCEDURE N/A 2023     Procedure: Pacemaker RV lead insertion with generator change out. [employer];  Surgeon: Steven Hammond MD;  Location: [facility] CATH INVASIVE LOCATION;  Service: Cardiovascular;  Laterality: N/A;    CARDIAC SURGERY         stent placed in aorta    CARDIAC SURGERY         6 surgeries as baby     CERVICAL FUSION ANTERIOR WITH ARTIFICIAL DISCECTOMY IMPLANTATION N/A 2020     Procedure: C4 VERTEBRECTOMY AND ANTERIOR CERIVCAL DISCECTOMY WITH FUSION OF CERVICAL THREE THROUGH FIVE WITH REMOVAL OF HARDWARE C5-C6;  Surgeon: Mark Kaba MD;  Location: [facility] MAIN OR;  Service: Neurosurgery;  Laterality: N/A;     SECTION         x2    COLONOSCOPY N/A 10/23/2020     Procedure: COLONOSCOPY WITH POLYPECTOMY X6;  Surgeon: Stanley Bourne MD;  Location: [facility] ENDOSCOPY;  Service: Gastroenterology;  Laterality: N/A;   POLYPS, INTERNAL HEMORRHOIDS    ENDOMETRIAL ABLATION         REMOVAL SCAR TISSUE UTERINE    ENDOSCOPY N/A 10/23/2020     Procedure: ESOPHAGOGASTRODUODENOSCOPY WITH BIOPSY X 1 AREA;  Surgeon: Stanley Bourne MD;  Location: [facility] ENDOSCOPY;  Service: Gastroenterology;  Laterality: N/A;  GASTRITIS, ESOPHAGITIS, HIATAL HERNIA    INSERT / REPLACE / REMOVE PACEMAKER        KNEE SURGERY   2000    MASTECTOMY Bilateral      NECK SURGERY        PACEMAKER IMPLANTATION        PAIN PUMP INSERTION/REVISION N/A 04/05/2022     Procedure: PAIN PUMP INSERTION AND INTRATHECAL CATHETER PLACEMENT;  Surgeon: Chuck Hunter MD;  Location: [facility] MAIN OR;  Service: Pain Management;  Laterality: N/A;           General Information         Row Name 01/10/25 1238                 OT Time and Intention     Document Type evaluation  -SP       Mode of Treatment occupational therapy  -SP          Row Name 01/10/25 1238                 General Information     Patient Profile Reviewed yes  -SP       Prior Level of Function independent:;ADL's;work;driving  -SP       Existing Precautions/Restrictions fall;cardiac  -SP       Barriers to Rehab medically complex  -SP          Row Name 01/10/25 1238                 Living Environment     People in Home significant other;other relative(s)  -SP          Row Name 01/10/25 1238                 Home Main Entrance     Number of Stairs, Main Entrance one  -SP          Row Name 01/10/25 1238                 Stairs Within Home, Primary     Stairs, Within Home, Primary Pt stays in the basement of her home.  -SP       Number of Stairs, Within Home, Primary twelve  -SP          Row Name 01/10/25 1238                 Cognition     Orientation Status (Cognition) oriented x 4  -SP          Row Name 01/10/25 1238                 Safety Issues/Impairments Affecting Functional Mobility     Impairments Affecting Function (Mobility) endurance/activity tolerance;balance;strength;pain  -SP                        User Key  (r) = Recorded By, (t) = Taken By, (c) = Cosigned By        Initials Name Provider Type     SP Servando Sanchez OT Occupational Therapist                                     Mobility/ADL's         Row Name 01/10/25 1239                 Bed Mobility     Bed Mobility bed mobility (all) activities  -SP       All Activities, [place] (Bed Mobility) modified independence  -SP       Assistive Device (Bed Mobility) bed rails  -SP          Row Name 01/10/25 1239                 Transfers     Transfers sit-stand transfer  -SP          Row Name 01/10/25 1239                 Sit-Stand Transfer     Sit-Stand [place] (Transfers) contact guard  -SP       Assistive Device (Sit-Stand Transfers) walker, front-wheeled  -SP          Row Name 01/10/25 1239                 Functional Mobility     Patient was able to Ambulate yes  -SP          Row Name 01/10/25 1239                 Activities of Daily Living     BADL Assessment/Intervention lower body dressing  -SP          Row Name 01/10/25 1239                 Lower Body Dressing Assessment/Training     [place] Level (Lower Body Dressing) don;socks;independent  -SP       Position (Lower Body Dressing) edge of bed sitting;unsupported sitting  -SP                       User Key  (r) = Recorded By, (t) = Taken By, (c) = Cosigned By        Initials Name Provider Type     SP Servando Sanchez OT Occupational Therapist                            Obj/Interventions         Row Name 01/10/25 1239                 Range of Motion Comprehensive     General Range of Motion bilateral upper extremity ROM WFL  -SP          Row Name 01/10/25 1239                 Strength Comprehensive (MMT)     Comment, General Manual Muscle Testing (MMT) Assessment BUE grossly 3-/5  -SP          Row Name 01/10/25 1239                 Balance     Balance Assessment sitting dynamic balance;sit to stand dynamic balance;standing dynamic balance  -SP       Dynamic Sitting Balance standby assist  -SP        Position, Sitting Balance unsupported;sitting edge of bed  -SP       Sit to Stand Dynamic Balance contact guard  -SP       Dynamic Standing Balance contact guard  -SP       Position/Device Used, Standing Balance walker, front-wheeled  -SP                       User Key  (r) = Recorded By, (t) = Taken By, (c) = Cosigned By        Initials Name Provider Type     SP Servando Sanchez, OT Occupational Therapist                            Goals/Plan         Row Name 01/10/25 1244                 Bathing Goal 1 (OT)     Activity/Device (Bathing Goal 1, OT) bathing skills, all  -SP       [place] Level/Cues Needed (Bathing Goal 1, OT) minimum assist (75% or more patient effort)  -SP       Time Frame (Bathing Goal 1, OT) 2 weeks  -SP       Strategies/Barriers (Bathing Goal 1, OT) until d/c  -SP          Row Name 01/10/25 1240                 Dressing Goal 1 (OT)     Activity/Device (Dressing Goal 1, OT) dressing skills, all  -SP       [place]/Cues Needed (Dressing Goal 1, OT) modified independence  -SP       Time Frame (Dressing Goal 1, OT) 2 weeks  -SP       Strategies/Barriers (Dressing Goal 1, OT) until d/c  -SP          Row Name 01/10/25 1248                 Toileting Goal 1 (OT)     Activity/Device (Toileting Goal 1, OT) toileting skills, all  -SP       [place] Level/Cues Needed (Toileting Goal 1, OT) modified independence  -SP       Time Frame (Toileting Goal 1, OT) 2 weeks  -SP       Strategies/Barriers (Toileting Goal 1, OT) until d/c  -SP          Row Name 01/10/25 1244                 Therapy Assessment/Plan (OT)     Planned Therapy Interventions (OT) activity tolerance training;BADL retraining;IADL retraining;patient/caregiver education/training;strengthening exercise;ROM/therapeutic exercise;occupation/activity based interventions;transfer/mobility retraining  -SP                    User Key  (r) = Recorded By, (t) = Taken By, (c) = Cosigned By        Initials Name Provider Type     GUY Sanchez  "TELLY Al Occupational Therapist                         Clinical Impression         Row Name 01/10/25 1240                 Pain Assessment     Pain Side/Orientation generalized  -SP       Additional Documentation Pain Scale: FACES Pre/Post-Treatment (Group)  -SP          Row Name 01/10/25 1240                 Pain Scale: FACES Pre/Post-Treatment     Pain: FACES Scale, Pretreatment 6-->hurts even more  -SP       Posttreatment Pain Rating 6-->hurts even more  -SP          Row Name 01/10/25 1240                 Plan of Care Review     Plan of Care Reviewed With patient  -SP       Outcome Evaluation Pt is a 63 y/o female admitted to [facility] on 1/5/25 with c/o diarrhea with associated n/v, generalized weakness and fall. Pt has JOSE and thrombocytopenia and bacteremia. CT head (-). CT C-spine (-). CT facial bones (-). CXR (-). PMHx significant for breast cancer with metastasis to bone, complex cardiac medical history including tetralogy of Fallot, coarctation of aorta, VSD status post repair, coarctation of aorta S/P stent. At baseline, pt resides with ex , ALEX, and nephew. Pt resides in basement with x1 GILA. Pt reports she is IND to complete ADLs, actively working, and driving. Upon assessment, pt A&O x4 and reports pain \"all over\"/generalized. Pt completed bed mobility with mod I using bed rails and sat EOB with SBA. Pt exhibits global weakness MMT 3-/5 and lethargic. Pt comes to standing with CGA and FWW where she was able to ambulate short distances, though desats on RA, though poor pleth, rebounds to 100% when resting with no s/s of SOA. Pt is significantly below her baseline and would benefit from continued skilled OT services for increased activity tolerance, strength, and endurance in preparation for ADLs. OT recommending SNF; pending progress.  -SP          Row Name 01/10/25 1240                 Therapy Assessment/Plan (OT)     Criteria for Skilled Therapeutic Interventions Met (OT) yes;meets criteria;skilled " treatment is necessary  -SP       Therapy Frequency (OT) 3 times/wk  -SP       Predicted Duration of Therapy Intervention (OT) until d/c  -SP          Row Name 01/10/25 1240                 Therapy Plan Review/Discharge Plan (OT)     Anticipated Discharge Disposition (OT) skilled nursing facility  -SP          Row Name 01/10/25 1240                 Vital Signs     Pre SpO2 (%) 100  -SP       O2 Delivery Pre Treatment room air  -SP       Intra SpO2 (%) 96  -SP       O2 Delivery Intra Treatment room air  -SP       Post SpO2 (%) 100  -SP       O2 Delivery Post Treatment room air  -SP       Pre Patient Position Supine  -SP       Intra Patient Position Standing  -SP       Post Patient Position Supine  -SP          Row Name 01/10/25 1240                 Positioning and Restraints     Pre-Treatment Position in bed  -SP       Post Treatment Position bed  -SP       In Bed notified nsg;fowlers;call light within reach;encouraged to call for assist;with family/caregiver  -SP                       User Key  (r) = Recorded By, (t) = Taken By, (c) = Cosigned By        Initials Name Provider Type     SP Servando Sanchez, OT Occupational Therapist                            Outcome Measures         Row Name 01/10/25 1251                 How much help from another is currently needed...     Putting on and taking off regular lower body clothing? 3  -SP       Bathing (including washing, rinsing, and drying) 3  -SP       Toileting (which includes using toilet bed pan or urinal) 4  -SP       Putting on and taking off regular upper body clothing 4  -SP       Taking care of personal grooming (such as brushing teeth) 4  -SP       Eating meals 4  -SP       AM-PAC 6 Clicks Score (OT) 22  -SP          Row Name 01/10/25 0400                 How much help from another person do you currently need...     Turning from your back to your side while in flat bed without using bedrails? 3  -OA       Moving from lying on back to sitting on the side of a  flat bed without bedrails? 3  -OA       Moving to and from a bed to a chair (including a wheelchair)? 3  -OA       Standing up from a chair using your arms (e.g., wheelchair, bedside chair)? 3  -OA       Climbing 3-5 steps with a railing? 1  -OA       To walk in hospital room? 3  -OA       AM-PAC 6 Clicks Score (PT) 16  -OA       Highest Level of Mobility Goal 5 --> Static standing  -OA          Row Name 01/10/25 1251                 Functional Assessment     Outcome Measure Options AM-PAC 6 Clicks Daily Activity (OT)  -SP                       User Key  (r) = Recorded By, (t) = Taken By, (c) = Cosigned By        Initials Name Provider Type     SP Servando Sanchez OT Occupational Therapist     Lele Caro LPN Licensed Nurse                             Occupational Therapy Education            Title: PT OT SLP Therapies (In Progress)         Topic: Occupational Therapy (In Progress)         Point: ADL training (Done)         Description:   Instruct learner(s) on proper safety adaptation and remediation techniques during self care or transfers.   Instruct in proper use of assistive devices.                       Learning Progress Summary             Patient Acceptance, E,TB, VU by SP at 1/10/2025 1251                            Point: Home exercise program (Not Started)         Description:   Instruct learner(s) on appropriate technique for monitoring, assisting and/or progressing therapeutic exercises/activities.                       Learner Progress:  Not documented in this visit.                  Point: Precautions (Done)         Description:   Instruct learner(s) on prescribed precautions during self-care and functional transfers.                       Learning Progress Summary             Patient Acceptance, E,TB, VU by SP at 1/10/2025 1251                            Point: Body mechanics (Done)         Description:   Instruct learner(s) on proper positioning and spine alignment during self-care,  "functional mobility activities and/or exercises.                       Learning Progress Summary             Patient Acceptance, E,TB, VU by SP at 1/10/2025 1251                                                User Key         Initials Effective Dates Name Provider Type Discipline     GUY 11/15/23 -  Servando Sanchez, TELLY Occupational Therapist OT                          OT Recommendation and Plan  Planned Therapy Interventions (OT): activity tolerance training, BADL retraining, IADL retraining, patient/caregiver education/training, strengthening exercise, ROM/therapeutic exercise, occupation/activity based interventions, transfer/mobility retraining  Therapy Frequency (OT): 3 times/wk  Plan of Care Review  Plan of Care Reviewed With: patient  Outcome Evaluation: Pt is a 65 y/o female admitted to [facility] on 1/5/25 with c/o diarrhea with associated n/v, generalized weakness and fall. Pt has JOSE and thrombocytopenia and bacteremia. CT head (-). CT C-spine (-). CT facial bones (-). CXR (-). PMHx significant for breast cancer with metastasis to bone, complex cardiac medical history including tetralogy of Fallot, coarctation of aorta, VSD status post repair, coarctation of aorta S/P stent. At baseline, pt resides with ex , ALEX, and nephew. Pt resides in basement with x1 GILA. Pt reports she is IND to complete ADLs, actively working, and driving. Upon assessment, pt A&O x4 and reports pain \"all over\"/generalized. Pt completed bed mobility with mod I using bed rails and sat EOB with SBA. Pt exhibits global weakness MMT 3-/5 and lethargic. Pt comes to standing with CGA and FWW where she was able to ambulate short distances, though desats on RA, though poor pleth, rebounds to 100% when resting with no s/s of SOA. Pt is significantly below her baseline and would benefit from continued skilled OT services for increased activity tolerance, strength, and endurance in preparation for ADLs. OT recommending SNF; pending progress.   "    Time Calculation:        Time Calculation- OT         Row Name 01/10/25 1251                       Time Calculation- OT     OT Start Time 1055  -SP         OT Stop Time 1118  -SP         OT Time Calculation (min) 23 min  -SP         OT Received On 01/10/25  -SP         OT - Next Appointment 01/13/25  -SP         OT Goal Re-Cert Due Date 01/24/25  -SP                      User Key  (r) = Recorded By, (t) = Taken By, (c) = Cosigned By        Initials Name Provider Type     SP Servando Sanchez OT Occupational Therapist                       Therapy Charges for Today         Code Description Service Date Service Provider Modifiers Qty     91624946670 HC OT EVAL MOD COMPLEXITY 4 1/10/2025 Servando Sanchez OT GO 1                   Servando Sanchez OT               1/10/2025                Nai Ramirez PTA   Physical Therapist Assistant  Specialty:  Physical Therapy  Therapy Treatment Note     Signed  Date of Service:  01/11/25 1410  Creation Time:  01/11/25 1430     Signed        Subjective: Pt agreeable to therapeutic plan of care. Declines mobility this date reporting she has been up ad alex with RW and ate more this date but is too fatigued at the moment.      Objective:      Precautions - Falls     Bed mobility - N/A or Not attempted.  Transfers - N/A or Not attempted.  Ambulation -  N/A or Not attempted.        Education: Provided education on the importance of mobility in the acute care setting and Energy conservation strategies     Assessment: Gladys Garrison presents with functional mobility impairments which indicate the need for skilled intervention. Continue to recommend rehab based on previous tx note and Pt below baseline at this time. Education provided this date on OOB ax throughout hospital stay. Tolerating session today without incident. Will continue to follow and progress as tolerated.      Plan/Recommendations:   If medically appropriate, Moderate Intensity Therapy recommended post-acute care.  "This is recommended as therapy feels the patient would require 3-4 days per week and wouldn't tolerate \"3 hour daily\" rehab intensity. SNF would be the preferred choice. If the patient does not agree to SNF, arrange HH or OP depending on home bound status. If patient is medically complex, consider LTACH. Pt requires no DME at discharge.      Pt desires Skilled Rehab placement at discharge. Pt cooperative; agreeable to therapeutic recommendations and plan of care.            Basic Mobility 6-click:  Rollin = Total, A lot = 2, A little = 3; 4 = None  Supine>Sit:                      1 = Total, A lot = 2, A little = 3; 4 = None   Sit>Stand with arms:       1 = Total, A lot = 2, A little = 3; 4 = None  Bed>Chair:                      1 = Total, A lot = 2, A little = 3; 4 = None  Ambulate in room:           1 = Total, A lot = 2, A little = 3; 4 = None  3-5 Steps with railin = Total, A lot = 2, A little = 3; 4 = None  Score: 18     Modified [place]: N/A = No pre-op stroke/TIA     Post-Tx Position: Supine with HOB Elevated and Call light and personal items within reach  PPE: gloves     Therapy Charges for Today         Code Description Service Date Service Provider Modifiers Qty     [id number]  PT THERAPEUTIC ACT EA 15 MIN 2025 Nai Ramirez PTA GP 1               PT Charges         Row Name 25 1430                       Time Calculation     Start Time 1410  -CC         Stop Time 1418  -CC         Time Calculation (min) 8 min  -CC         PT Received On 25  -CC         PT - Next Appointment 25  -CC                 Time Calculation- PT     Total Timed Code Minutes- PT 8 minute(s)  -CC                      User Key  (r) = Recorded By, (t) = Taken By, (c) = Cosigned By        Initials Name Provider Type     CC Nai Ramirez PTA Physical Therapist Assistant                                  "

## 2025-01-11 NOTE — PROGRESS NOTES
Name: Gladys Garrison  Age: 64 y.o.  : 1960  Sex: female    27    Subjective  Patient feels well.     Interval History   No Acute events overnight.      Objective:    Vital Signs  Temp:  [97.6 °F (36.4 °C)-98.4 °F (36.9 °C)] 97.6 °F (36.4 °C)  Heart Rate:  [60-66] 60  Resp:  [16-20] 19  BP: (134-154)/(63-79) 134/79        Intake/Output Summary (Last 24 hours) at 2025 1238  Last data filed at 1/10/2025 2043  Gross per 24 hour   Intake 240 ml   Output --   Net 240 ml           Physical Exam  Physical Exam        Results Review:      Results from last 7 days   Lab Units 25  0523 01/10/25  0606 25  1212 25  0401 25  0453 25  0553 25  0022   SODIUM mmol/L 136 135* 137 134* 135* 136 135*   CO2 mmol/L 23.2 19.8* 21.4* 19.7* 16.9* 16.6* 19.5*   BUN mg/dL 15 18 23 39* 50* 52* 56*   CREATININE mg/dL 0.60 0.61 0.74 0.86 1.09* 1.79* 2.05*   CALCIUM mg/dL 8.4* 8.1* 8.5* 8.7 7.6* 7.7* 9.0   ALBUMIN g/dL  --   --   --  3.0* 2.8* 3.3* 3.2*   AST (SGOT) U/L  --   --   --  33* 36* 36* 46*   ALT (SGPT) U/L  --   --   --  14 17 18 24   EGFR mL/min/1.73 101.6 101.2 91.6 76.5 57.6* 31.7* 27.0*       Results from last 7 days   Lab Units 01/10/25  0950 25  0401 25  0453 25  0553 25  0022   WBC 10*3/mm3 7.54 3.80 4.64 4.89 11.40*       Imaging studies: I personally reviewed the patient's most recent pertinent imaging studies       Medication Review:   amLODIPine, 5 mg, Oral, Q24H  cefTRIAXone, 2,000 mg, Intravenous, Q24H  furosemide, 20 mg, Oral, Daily  heparin (porcine), 5,000 Units, Subcutaneous, Q8H  insulin lispro, 2-7 Units, Subcutaneous, 4x Daily AC & at Bedtime  sodium bicarbonate, 650 mg, Oral, BID  sodium chloride, 10 mL, Intravenous, Q12H  sodium chloride, 10 mL, Intravenous, Q12H          Assessment  JOSE  UTI  Proteinuria  Metabolic acidosis  Bacteremia  Edema      Plan:  Vital signs are stable.  Patient stable.   Slight edema continue oral Lasix.   Potassium 3.5 start daily oral potassium supplementation.  Evaluation of proteinuria as outpatient.    Check BMP  in AM.      Ander Huggins MD  01/11/25  12:38 EST  Tel: [phone]  Fax: [phone]

## 2025-01-11 NOTE — CASE MANAGEMENT/SOCIAL WORK
Continued Stay Note   [facility]     Patient Name: Gladys Garrison  MRN: [id number]  Today's Date: 1/11/2025    Admit Date: 1/5/2025    Plan: [facility] ACCEPTED. PASRR approved. Pre-cert requested.   Discharge Plan       Row Name 01/11/25 1614       Plan    Plan [facility] ACCEPTED. PASRR approved. Pre-cert requested.    Plan Comments [facility] accepted. PASRR approved. Pre-cert requested.                Silvana Alcala RN     Office: [phone]  Fax: [phone]

## 2025-01-11 NOTE — THERAPY TREATMENT NOTE
"Subjective: Pt agreeable to therapeutic plan of care. Declines mobility this date reporting she has been up ad alex with RW and ate more this date but is too fatigued at the moment.     Objective:     Precautions - Falls    Bed mobility - N/A or Not attempted.  Transfers - N/A or Not attempted.  Ambulation -  N/A or Not attempted.      Education: Provided education on the importance of mobility in the acute care setting and Energy conservation strategies    Assessment: Gladys Garrison presents with functional mobility impairments which indicate the need for skilled intervention. Continue to recommend rehab based on previous tx note and Pt below baseline at this time. Education provided this date on OOB ax throughout hospital stay. Tolerating session today without incident. Will continue to follow and progress as tolerated.     Plan/Recommendations:   If medically appropriate, Moderate Intensity Therapy recommended post-acute care. This is recommended as therapy feels the patient would require 3-4 days per week and wouldn't tolerate \"3 hour daily\" rehab intensity. SNF would be the preferred choice. If the patient does not agree to SNF, arrange HH or OP depending on home bound status. If patient is medically complex, consider LTACH. Pt requires no DME at discharge.     Pt desires Skilled Rehab placement at discharge. Pt cooperative; agreeable to therapeutic recommendations and plan of care.         Basic Mobility 6-click:  Rollin = Total, A lot = 2, A little = 3; 4 = None  Supine>Sit:   1 = Total, A lot = 2, A little = 3; 4 = None   Sit>Stand with arms:  1 = Total, A lot = 2, A little = 3; 4 = None  Bed>Chair:   1 = Total, A lot = 2, A little = 3; 4 = None  Ambulate in room:  1 = Total, A lot = 2, A little = 3; 4 = None  3-5 Steps with railin = Total, A lot = 2, A little = 3; 4 = None  Score: 18    Modified Zac: N/A = No pre-op stroke/TIA    Post-Tx Position: Supine with HOB Elevated and Call light " and personal items within reach  PPE: gloves    Therapy Charges for Today       Code Description Service Date Service Provider Modifiers Qty    86661607876 HC PT THERAPEUTIC ACT EA 15 MIN 1/11/2025 Nai Ramirez PTA GP 1           PT Charges       Row Name 01/11/25 1430             Time Calculation    Start Time 1410  -CC      Stop Time 1418  -CC      Time Calculation (min) 8 min  -CC      PT Received On 01/11/25  -CC      PT - Next Appointment 01/13/25  -CC         Time Calculation- PT    Total Timed Code Minutes- PT 8 minute(s)  -CC                User Key  (r) = Recorded By, (t) = Taken By, (c) = Cosigned By      Initials Name Provider Type    CC Nai Ramirez PTA Physical Therapist Assistant

## 2025-01-11 NOTE — PROGRESS NOTES
[facility] MEDICINE SERVICE  DAILY PROGRESS NOTE    NAME: Gladys Garrison  : 1960  MRN: [id number]      LOS: 5 days     PROVIDER OF SERVICE: Bautista Chapman MD    Chief Complaint: JOSE (acute kidney injury)    Subjective:     Interval History: Patient is doing better today, with improvement in her pain.  She was able to ambulate somewhat more today with her walker.  She is concerned about increased swelling of her legs.  She also ate much of her breakfast which is a significant improvement for her.    Review of Systems:   Review of Systems    Objective:     Vital Signs  Temp:  [97.6 °F (36.4 °C)-98.4 °F (36.9 °C)] 97.6 °F (36.4 °C)  Heart Rate:  [60-66] 60  Resp:  [16-20] 19  BP: (134-154)/(63-79) 134/79   Body mass index is 19.92 kg/m².    Physical Exam  General Appearance:  Alert, cooperative, no distress, appears stated age  Head:  Normocephalic, without obvious abnormality, atraumatic  Eyes:  PERRL, conjunctiva/corneas clear, EOM's intact, fundi benign, both eyes  Ears:  Normal TM's and external ear canals, both ears  Nose: Nares normal, septum midline, mucosa normal, no drainage or sinus tenderness  Throat: Lips, mucosa, and tongue normal; teeth and gums normal  Neck: Supple, symmetrical, trachea midline, no adenopathy, thyroid: not enlarged, symmetric, no tenderness/mass/nodules, no carotid bruit or JVD  Lungs:   Clear to auscultation bilaterally, respirations unlabored  Heart:  Regular rate and rhythm, S1, S2 normal, no murmur, rub or gallop  Abdomen:  Soft, mild diffuse TTP, bowel sounds active all four quadrants,  no masses, no organomegaly  Extremities: 1-2+ BL LE pitting edema   Pulses: 2+ and symmetric  Skin: Skin color, texture, turgor normal, no rashes or lesions  Neurologic: Normal        Scheduled Meds   amLODIPine, 5 mg, Oral, Q24H  cefTRIAXone, 2,000 mg, Intravenous, Q24H  furosemide, 20 mg, Oral, Daily  heparin (porcine), 5,000 Units, Subcutaneous, Q8H  insulin lispro, 2-7 Units,  Subcutaneous, 4x Daily AC & at Bedtime  sodium bicarbonate, 650 mg, Oral, BID  sodium chloride, 10 mL, Intravenous, Q12H  sodium chloride, 10 mL, Intravenous, Q12H       PRN Meds     Calcium Replacement - Follow Nurse / BPA Driven Protocol    dextrose    dextrose    glucagon (human recombinant)    Magnesium Standard Dose Replacement - Follow Nurse / BPA Driven Protocol    nitroglycerin    ondansetron    Phosphorus Replacement - Follow Nurse / BPA Driven Protocol    Potassium Replacement - Follow Nurse / BPA Driven Protocol    sodium chloride    sodium chloride    sodium chloride    sodium chloride    sodium chloride   Infusions           Diagnostic Data    Results from last 7 days   Lab Units 01/11/25  0523 01/10/25  0950 01/08/25  1514 01/08/25  0401   WBC 10*3/mm3  --  7.54  --  3.80   HEMOGLOBIN g/dL  --  9.3*  --  10.8*   HEMATOCRIT %  --  28.8*  --  33.7*   PLATELETS 10*3/mm3  --  53*  --  43*   GLUCOSE mg/dL 102*  --    < > 113*   CREATININE mg/dL 0.60  --    < > 0.86   BUN mg/dL 15  --    < > 39*   SODIUM mmol/L 136  --    < > 134*   POTASSIUM mmol/L 3.5  --    < > 3.6   AST (SGOT) U/L  --   --   --  33*   ALT (SGPT) U/L  --   --   --  14   ALK PHOS U/L  --   --   --  52   BILIRUBIN mg/dL  --   --   --  0.5   ANION GAP mmol/L 8.8  --    < > 7.3    < > = values in this interval not displayed.       No radiology results for the last day      I reviewed the patient's new clinical results.    Assessment/Plan:   Gladys Garrison is a 64 y.o. female with a PMH of breast cancer with metastasis to bone, complex cardiac medical history including tetralogy of Fallot, coarctation of aorta, VSD status post repair, coarctation of aorta S/P stent who presented to [facility] on 1/5/2025 with diarrhea since around last Tuesday it is dark color, associated with nausea and multiple episodes of vomiting. States she has not been able to keep anything down, having generalized abdominal cramps, subjective fever and  chills, generalized weakness. States she was treated for a UTI last month. States she has not urinated at all in past 3-4 days. Denies hematuria or dysuria, no flank pain. She sustained a fall 2 days ago and hit her head and face, denies LOC. Workup in ER chemistry labs show creat 2.05, K 3.4, Na 135, albumin 3.2, T bili 1.4. CBC labs Hb 12.6, wbc 11.4, platelets 52. ED course notable for hypotension that slightly improved with IVF, temp 100.1. Saturating well on room air. The decision to admit was made.     Hypotension due to volume depletion  Diarrheal illness  Oliguric JOSE due to hypovolemia on the basis of diarrhea and vomiting, NSAIDS use  Melena by history  -Patient was initiated on IV fluids with improvement in renal function back to baseline  -Patient states her diarrhea has improved although her p.o. intake is still somewhat poor  -Added nutritional supplement with meals  -Continue IV antibiotics with ceftriaxone for bacteremia but discontinued Flagyl   -Holding all nephrotoxic medications including losartan given JOSE  -Advanced to GI soft diet  -Restarted Lasix given that patient now has some peripheral edema    Group B strep bacteremia with ICD lead vegetation  -Unclear source although this could be related to UTI  -Urine culture with 50,000 CFU GBS  -Repeat blood cultures from 1/8 with no growth  -ID consult appreciated  -Continue ceftriaxone  -GREGORY confirmed the vegetation  -Not a candidate for lead extraction given that patient is complete dependent on this device  -Plan for a 6-week course of IV antibiotics; PICC line placement    Possible UTI  -UA on admission suggestive of possible infection  -Continue ceftriaxone as above     History breast cancer with bone metastasis  Thrombocytopenia  -Was on Arimidex in the past was discontinued due to osteoporosis  -Had intolerance to tamoxifen  -She is currently on Abemeciclib outpatient, but this is being held during treatment for her acute infectious  process  -Appreciate oncology consult  -Monitor daily CBCs, monitor platelets  -Continue Norco  mg q 4 PRN for moderate pain  -Continue with intrathecal pain pump          VTE Prophylaxis:  Pharmacologic VTE prophylaxis orders are present.         Code status is   Code Status and Medical Interventions: CPR (Attempt to Resuscitate); Full Support   Ordered at: 01/07/25 1310     Code Status (Patient has no pulse and is not breathing):    CPR (Attempt to Resuscitate)     Medical Interventions (Patient has pulse or is breathing):    Full Support       Plan for disposition: Likely to SNF on 1/13    Time: 30 minutes    Part of this note may be an electronic transcription/translation of spoken language to printed text using the Dragon Dictation System.    Signature: Electronically signed by Bautista Chapman MD, 01/11/25, 13:26 EST.  [facility] Hospitalist Team

## 2025-01-11 NOTE — SIGNIFICANT NOTE
01/11/25 1642   Post Acute Pre-Cert Documentation   Request Submitted by Facility - Type: Hospital   Post-Acute Authorization Type Submitted: SNF   Date Post Acute Pre-Cert Inititated per Facility 01/11/25   Accepting Facility [facility]   Hospital Discharge Date Requested 01/12/25   Had Accepting Facility at Time of Submission Yes   Response Communicated to:    Authorization Number: reference #[id number]   Post Acute Pre-Cert Initiated Comment UR RN submitted SNF precert for [facility] via availity- pending - cm made aware

## 2025-01-11 NOTE — DISCHARGE PLACEMENT REQUEST
"Lucien Garrison (64 y.o. Female)       Date of Birth   1960    Social Security Number       Address   [address] [place] [address] [address] [place] [address]    Home Phone   [phone]    MRN   [id number]       Sikhism   None    Marital Status   Legally                             Admission Date   25    Admission Type   Emergency    Admitting Provider   Llanes Alvarez, Carlos, MD    Attending Provider   Bautista Chapman MD    Department, Room/Bed   [facility] 2D, 273/A       Discharge Date       Discharge Disposition       Discharge Destination                                 Attending Provider: Bautista Chapman MD    Allergies: Oxycodone, Promethazine, Tape, Adhesive Tape, Flexeril [Cyclobenzaprine]    Isolation: None   Infection: None   Code Status: CPR    Ht: 152.4 cm (60\")   Wt: 46.3 kg (102 lb)    Admission Cmt: None   Principal Problem: JOSE (acute kidney injury) [N17.9]                   Active Insurance as of 2025       Primary Coverage       Payor Plan Insurance Group Employer/Plan Group    AETNA MEDICARE REPLACEMENT [id number]       Payor Plan Address Payor Plan Phone Number Payor Plan Fax Number Effective Dates    [address] [phone]  2024 - None Entered    [facility] [id number]         Subscriber Name Subscriber Birth Date Member ID       LUCIEN GARRISON 1960 [id number]                     Emergency Contacts        (Rel.) Home Phone Work Phone Mobile Phone    ANGELLA GARRISON (Spouse) [phone]                 History & Physical        Llanes Alvarez, Carlos, MD at [address] [facility]              [facility] Medicine Services  History & Physical    Patient Name: Lucien Garrison  : 1960  MRN: [id number]  Primary Care Physician:  Chirag Robles DO  Date of admission: 2025  Date and Time of Service: 2025 at [facility]    Subjective      Chief Complaint: \"diarrhea, nausea, generalized " "weakness\"    History of Present Illness: Gladys Garrison is a 64 y.o. female with a PMH of breast cancer with metastasis to bone, complex cardiac medical history including tetralogy of Fallot, coarctation of aorta, VSD status post repair, coarctation of aorta S/P stent who presented to [facility] on 1/5/2025 with diarrhea since around last Tuesday it is dark color, associated with nausea and multiple episodes of vomiting. States she has not been able to keep anything down, having generalized abdominal cramps, subjective fever and chills, generalized weakness. States she was treated for a UTI last month. States she has not urinated at all in past 3-4 days. Denies hematuria or dysuria, no flank pain. She sustained a fall 2 days ago and hit her head and face, denies LOC. Workup in ER chemistry labs show creat 2.05, K 3.4, Na 135, albumin 3.2, T bili 1.4. CBC labs Hb 12.6, wbc 11.4, platelets 52. ED course notable for hypotension that slightly improved with IVF, temp 100.1. Saturating well on room air. The decision to admit was made.       Review of Systems   Constitutional:  Positive for fatigue and fever. Negative for chills.   Respiratory:  Negative for cough, choking and shortness of breath.    Cardiovascular:  Negative for chest pain.   Gastrointestinal:  Positive for abdominal pain, blood in stool, diarrhea, nausea and vomiting.   Genitourinary:  Positive for decreased urine volume. Negative for dysuria, flank pain and hematuria.   Neurological:  Positive for light-headedness. Negative for syncope.       Personal History     Past Medical History:   Diagnosis Date    Allergic     Bone cancer     METASTATIC BONE; stage 4    Bradycardia     SECONDARY TO ABLATION    Breast cancer 2017    mets to lymph nodes; did not do radiation    Cancer of unknown origin     Compression fx, thoracic spine, open, initial encounter     Coronary artery disease     COVID-19 09/01/2021    Diabetes mellitus     Heart disease, " unspecified     Hepatitis C     RESOLVED WITH MEDICATION    Hyperlipidemia     Hypertension     Obesity (BMI 30-39.9) 2021    Rib fracture     Per patient    Sleep apnea     no machine    Tachycardia     ATRIAL    Type 2 diabetes mellitus 2017       Past Surgical History:   Procedure Laterality Date    BACK SURGERY      neck X 2    BREAST RECONSTRUCTION Bilateral     CARDIAC ABLATION       atrial tachycardia x 5 ablations     CARDIAC CATHETERIZATION      CARDIAC ELECTROPHYSIOLOGY PROCEDURE N/A 2023    Procedure: Pacemaker RV lead insertion with generator change out. NIMBOXXtronic;  Surgeon: Steven Hammond MD;  Location: [facility] CATH INVASIVE LOCATION;  Service: Cardiovascular;  Laterality: N/A;    CARDIAC SURGERY      stent placed in aorta    CARDIAC SURGERY      6 surgeries as baby     CERVICAL FUSION ANTERIOR WITH ARTIFICIAL DISCECTOMY IMPLANTATION N/A 2020    Procedure: C4 VERTEBRECTOMY AND ANTERIOR CERIVCAL DISCECTOMY WITH FUSION OF CERVICAL THREE THROUGH FIVE WITH REMOVAL OF HARDWARE C5-C6;  Surgeon: Mark Kaba MD;  Location: [facility] MAIN OR;  Service: Neurosurgery;  Laterality: N/A;     SECTION      x2    COLONOSCOPY N/A 10/23/2020    Procedure: COLONOSCOPY WITH POLYPECTOMY X6;  Surgeon: Stanley Bourne MD;  Location: [facility] ENDOSCOPY;  Service: Gastroenterology;  Laterality: N/A;  POLYPS, INTERNAL HEMORRHOIDS    ENDOMETRIAL ABLATION      REMOVAL SCAR TISSUE UTERINE    ENDOSCOPY N/A 10/23/2020    Procedure: ESOPHAGOGASTRODUODENOSCOPY WITH BIOPSY X 1 AREA;  Surgeon: Stanley Bourne MD;  Location: [facility] ENDOSCOPY;  Service: Gastroenterology;  Laterality: N/A;  GASTRITIS, ESOPHAGITIS, HIATAL HERNIA    INSERT / REPLACE / REMOVE PACEMAKER      KNEE SURGERY      MASTECTOMY Bilateral     NECK SURGERY      PACEMAKER IMPLANTATION      PAIN PUMP INSERTION/REVISION N/A 2022    Procedure: PAIN PUMP INSERTION AND INTRATHECAL CATHETER PLACEMENT;  Surgeon:  Chuck Hunter MD;  Location: [facility] MAIN OR;  Service: Pain Management;  Laterality: N/A;       Family History: family history includes Aneurysm in her father; Cancer in her maternal aunt; Diabetes in her sister; Diabetes type I in her half-sister; Heart attack in her sister; Heart disease in her mother; Lung cancer in her mother; No Known Problems in her brother, brother, and sister; Stroke in her mother; Thyroid cancer in her half-sister; Thyroid disease in her sister. Otherwise pertinent FHx was reviewed and not pertinent to current issue.    Social History:  reports that she has never smoked. She has never been exposed to tobacco smoke. She has never used smokeless tobacco. She reports current alcohol use. She reports that she does not use drugs.    Home Medications:  Prior to Admission Medications       Prescriptions Last Dose Informant Patient Reported? Taking?    Abemaciclib (VERZENIO) 100 mg tablet chemo tablet   No No    Take 1 tablet by mouth 2 (Two) Times a Day. Take with or without food; Swallow whole, do not crush, chew or split tablets.    Blood Glucose Monitoring Suppl (Accu-Chek Araceli) device   No No    Use as instructed   To test blood sugar bid    Calcium Carbonate-Vit D-Min (Calcium 600+D Plus Minerals) 600-400 MG-UNIT chewable tablet   No No    Chew 600 mg 2 (Two) Times a Day.    Continuous Blood Gluc  (FreeStyle Rajeev 14 Day Reading) device   No No    1 each Daily.    Continuous Blood Gluc Sensor (FreeStyle Rajeev 14 Day Sensor) misc   No No    1 each Daily.    cyclobenzaprine (FLEXERIL) 10 MG tablet   No No    Take 1 tablet by mouth 2 (Two) Times a Day As Needed for Muscle Spasms.    diphenoxylate-atropine (LOMOTIL) 2.5-0.025 MG per tablet   No No    Take 1 tablet by mouth 3 (Three) Times a Day.    exemestane (AROMASIN) 25 MG tablet   Yes No    Take 1 tablet by mouth Daily.    famotidine (PEPCID) 20 MG tablet   Yes No    Take 1 tablet by mouth 2 (Two) Times a Day As Needed for  "Heartburn.    fluticasone (FLONASE) 50 MCG/ACT nasal spray   Yes No    Administer 2 sprays into the nostril(s) as directed by provider.    HYDROcodone-acetaminophen (NORCO)  MG per tablet   No No    Take 1 tablet by mouth Every 4 (Four) Hours As Needed for Moderate Pain.    ibuprofen (ADVIL,MOTRIN) 800 MG tablet   No No    Take 1 tablet by mouth Every 6 (Six) Hours As Needed for Mild Pain.    insulin NPH-insulin regular (humuLIN 70/30,novoLIN 70/30) (70-30) 100 UNIT/ML injection   No No    Inject 5 Units under the skin into the appropriate area as directed Every Evening. If BS over 180    Insulin Pen Needle (B-D UF III MINI PEN NEEDLES) 31G X 5 MM misc   No No    Use to inject insulin twice daily   DX: E11.9    Insulin Syringe-Needle U-100 28G X 1/2\" 1 ML misc   No No    1 each 2 (Two) Times a Day.    losartan (Cozaar) 50 MG tablet   No No    Take 1 tablet by mouth Daily. Discontinue the lisinopril due to interaction with new chemotherapy    Morphine Sulfate, PF, 8 MG/ML solution   Yes No    8.5 mg by Intrathecal Infusion route Daily. Receives 8 mg through pain pump q 24 hrs    Naloxegol Oxalate (Movantik) 12.5 MG tablet   No No    Take 1 tablet by mouth Every Morning.    ondansetron ODT (ZOFRAN-ODT) 4 MG disintegrating tablet   No No    Place 2 tablets on the tongue Every 8 (Eight) Hours As Needed for Nausea or Vomiting.    rosuvastatin (Crestor) 20 MG tablet   No No    Take 1 tablet by mouth Every Night.              Allergies:  Allergies   Allergen Reactions    Oxycodone Nausea And Vomiting    Promethazine Other (See Comments)     Hyperactive mean    Tape Other (See Comments)     .blisters      Adhesive Tape Rash    Flexeril [Cyclobenzaprine] Rash       Objective      Vitals:   Temp:  [100.1 °F (37.8 °C)] 100.1 °F (37.8 °C)  Heart Rate:  [60-82] 66  Resp:  [19] 19  BP: (90-96)/(46-62) 93/50  Body mass index is 19.92 kg/m².  Physical Exam  Constitutional:       General: She is not in acute distress.     " Appearance: She is ill-appearing.   HENT:      Head: Normocephalic.      Nose: Nose normal.      Mouth/Throat:      Mouth: Mucous membranes are dry.      Comments: Oral mucosae dry. Poor dentition.   Eyes:      Extraocular Movements: Extraocular movements intact.      Pupils: Pupils are equal, round, and reactive to light.   Cardiovascular:      Rate and Rhythm: Normal rate and regular rhythm.      Pulses: Normal pulses.      Heart sounds: Murmur heard.   Pulmonary:      Effort: Pulmonary effort is normal. No respiratory distress.      Breath sounds: Normal breath sounds. No wheezing.   Abdominal:      General: Abdomen is flat. There is no distension.      Palpations: Abdomen is soft.      Tenderness: There is no abdominal tenderness. There is no guarding.   Musculoskeletal:      Cervical back: Neck supple.   Skin:     General: Skin is dry.      Capillary Refill: Capillary refill takes less than 2 seconds.      Coloration: Skin is pale.   Neurological:      Mental Status: She is alert and oriented to person, place, and time.   Psychiatric:         Behavior: Behavior normal.         Diagnostic Data:  Lab Results (last 24 hours)       Procedure Component Value Units Date/Time    Respiratory Panel PCR w/COVID-19(SARS-CoV-2) ELISHA/MARQUEZ/HAMMAD/PAD/COR/SHARDA In-House, NP Swab in UTM/VTM, 2 HR TAT - Swab, Nasopharynx [[id number]]  (Normal) Collected: 01/06/25 0024    Specimen: Swab from Nasopharynx Updated: 01/06/25 0130     ADENOVIRUS, PCR Not Detected     Coronavirus 229E Not Detected     Coronavirus HKU1 Not Detected     Coronavirus NL63 Not Detected     Coronavirus OC43 Not Detected     COVID19 Not Detected     Human Metapneumovirus Not Detected     Human Rhinovirus/Enterovirus Not Detected     Influenza A PCR Not Detected     Influenza B PCR Not Detected     Parainfluenza Virus 1 Not Detected     Parainfluenza Virus 2 Not Detected     Parainfluenza Virus 3 Not Detected     Parainfluenza Virus 4 Not Detected     RSV, PCR Not  Detected     Bordetella pertussis pcr Not Detected     Bordetella parapertussis PCR Not Detected     Chlamydophila pneumoniae PCR Not Detected     Mycoplasma pneumo by PCR Not Detected    Narrative:      In the setting of a positive respiratory panel with a viral infection PLUS a negative procalcitonin without other underlying concern for bacterial infection, consider observing off antibiotics or discontinuation of antibiotics and continue supportive care. If the respiratory panel is positive for atypical bacterial infection (Bordetella pertussis, Chlamydophila pneumoniae, or Mycoplasma pneumoniae), consider antibiotic de-escalation to target atypical bacterial infection.    CBC & Differential [[id number]]  (Abnormal) Collected: 01/06/25 0022    Specimen: Blood Updated: 01/06/25 0059    Narrative:      The following orders were created for panel order CBC & Differential.  Procedure                               Abnormality         Status                     ---------                               -----------         ------                     CBC Auto Differential[[id number]]        Abnormal            Final result               Scan Slide[[id number]]                                       Final result                 Please view results for these tests on the individual orders.    CBC Auto Differential [[id number]]  (Abnormal) Collected: 01/06/25 0022    Specimen: Blood Updated: 01/06/25 0059     WBC 11.40 10*3/mm3      RBC 4.13 10*6/mm3      Hemoglobin 12.6 g/dL      Hematocrit 37.8 %      MCV 91.5 fL      MCH 30.5 pg      MCHC 33.3 g/dL      RDW 14.5 %      RDW-SD 48.8 fl      MPV 11.0 fL      Platelets 52 10*3/mm3     Scan Slide [[id number]] Collected: 01/06/25 0022    Specimen: Blood Updated: 01/06/25 0059     Scan Slide --     Comment: See Manual Differential Results       Manual Differential [[id number]]  (Abnormal) Collected: 01/06/25 0022    Specimen: Blood Updated: 01/06/25 0059     Neutrophil % 80.0 %       Lymphocyte % 1.0 %      Monocyte % 1.0 %      Bands %  14.0 %      Metamyelocyte % 2.0 %      Atypical Lymphocyte % 2.0 %      Neutrophils Absolute 10.72 10*3/mm3      Lymphocytes Absolute 0.34 10*3/mm3      Monocytes Absolute 0.11 10*3/mm3      Dacrocytes Slight/1+     Poikilocytes Slight/1+     RBC Fragments Slight/1+     WBC Morphology Normal     Platelet Estimate Decreased     Large Platelets Slight/1+     Giant Platelets Slight/1+    Urinalysis, Microscopic Only - Straight Cath [[id number]]  (Abnormal) Collected: 01/06/25 0035    Specimen: Urine from Straight Cath Updated: 01/06/25 0049     RBC, UA 3-5 /HPF      WBC, UA 21-50 /HPF      Bacteria, UA 1+ /HPF      Squamous Epithelial Cells, UA 13-20 /HPF      Transitional Epithelial Cells, UA 3-6 /HPF      Hyaline Casts, UA 0-2 /LPF      Methodology Automated Microscopy    Urine Culture - Urine, Straight Cath [[id number]] Collected: 01/06/25 0035    Specimen: Urine from Straight Cath Updated: 01/06/25 0049    Comprehensive Metabolic Panel [[id number]]  (Abnormal) Collected: 01/06/25 0022    Specimen: Blood Updated: 01/06/25 0048     Glucose 117 mg/dL      BUN 56 mg/dL      Creatinine 2.05 mg/dL      Sodium 135 mmol/L      Potassium 3.4 mmol/L      Comment: Slight hemolysis detected by analyzer. Result may be falsely elevated.        Chloride 100 mmol/L      CO2 19.5 mmol/L      Calcium 9.0 mg/dL      Total Protein 6.6 g/dL      Albumin 3.2 g/dL      ALT (SGPT) 24 U/L      AST (SGOT) 46 U/L      Alkaline Phosphatase 64 U/L      Total Bilirubin 1.4 mg/dL      Globulin 3.4 gm/dL      A/G Ratio 0.9 g/dL      BUN/Creatinine Ratio 27.3     Anion Gap 15.5 mmol/L      eGFR 27.0 mL/min/1.73     Narrative:      GFR Categories in Chronic Kidney Disease (CKD)      GFR Category          GFR (mL/min/1.73)    Interpretation  G1                     90 or greater         Normal or high (1)  G2                      60-89                Mild decrease (1)  G3a                   45-59                 Mild to moderate decrease  G3b                   30-44                Moderate to severe decrease  G4                    15-29                Severe decrease  G5                    14 or less           Kidney failure          (1)In the absence of evidence of kidney disease, neither GFR category G1 or G2 fulfill the criteria for CKD.    eGFR calculation 2021 CKD-EPI creatinine equation, which does not include race as a factor    Urinalysis With Culture If Indicated - Straight Cath [[id number]]  (Abnormal) Collected: 01/06/25 0035    Specimen: Urine from Straight Cath Updated: 01/06/25 0045     Color, UA Dark Yellow     Appearance, UA Cloudy     pH, UA <=5.0     Specific Gravity, UA 1.025     Comment: Result obtained by Refractometer        Glucose,  mg/dL (Trace)     Ketones, UA 15 mg/dL (1+)     Bilirubin, UA Large (3+)     Comment: Confirmation testing is unavailable.  A serum bilirubin is recommended for further assessment.        Blood, UA Negative     Protein, UA >=300 mg/dL (3+)     Leuk Esterase, UA Small (1+)     Nitrite, UA Negative     Urobilinogen, UA 1.0 E.U./dL    Narrative:      In absence of clinical symptoms, the presence of pyuria, bacteria, and/or nitrites on the urinalysis result does not correlate with infection.    POC Lactate [[id number]]  (Normal) Collected: 01/06/25 0037    Specimen: Blood Updated: 01/06/25 0040     Lactate 1.5 mmol/L      Comment: Serial Number: [id number]:  [id number]       Blood Culture - Blood, Arm, Left [[id number]] Collected: 01/06/25 0032    Specimen: Blood from Arm, Left Updated: 01/06/25 0039    Blood Culture - Blood, Arm, Right [[id number]] Collected: 01/06/25 0033    Specimen: Blood from Arm, Right Updated: 01/06/25 0039    [place] Draw [[id number]] Collected: 01/06/25 0022    Specimen: Blood Updated: 01/06/25 0031    Narrative:      The following orders were created for panel order [place] Draw.  Procedure                                Abnormality         Status                     ---------                               -----------         ------                     Green Top (Gel)[[id number]]                                  Final result               Lavender Top[[id number]]                                     Final result               Gold Top - SST[[id number]]                                   Final result               Light Blue Top[[id number]]                                   Final result                 Please view results for these tests on the individual orders.    Green Top (Gel) [[id number]] Collected: 01/06/25 0022    Specimen: Blood Updated: 01/06/25 0031     Extra Tube Hold for add-ons.     Comment: Auto resulted.       Lavender Top [[id number]] Collected: 01/06/25 0022    Specimen: Blood Updated: 01/06/25 0031     Extra Tube hold for add-on     Comment: Auto resulted       Gold Top - SST [[id number]] Collected: 01/06/25 0022    Specimen: Blood Updated: 01/06/25 0031     Extra Tube Hold for add-ons.     Comment: Auto resulted.       Light Blue Top [[id number]] Collected: 01/06/25 0022    Specimen: Blood Updated: 01/06/25 0031     Extra Tube Hold for add-ons.     Comment: Auto resulted                Imaging Results (Last 24 Hours)       Procedure Component Value Units Date/Time    CT Cervical Spine Without Contrast [[id number]] Collected: 01/06/25 0158     Updated: 01/06/25 0206    Narrative:      CT CERVICAL SPINE WO CONTRAST    Date of Exam: 1/6/2025 1:09 AM EST    Indication: Neck pain after fall.    Comparison: 3/29/2023    Technique: Axial CT images were obtained of the cervical spine without contrast administration.  Sagittal and coronal reconstructions were performed.  Automated exposure control and iterative reconstruction methods were used.    Findings:  Patient is fused from C3-C7 with an anterior plate and screws from C3-C6. Cervical alignment is normal. There is multilevel facet arthropathy. There is disc space narrowing at  C7-T1. There is scoliosis at the cervicothoracic junction. No acute cervical   spine fracture is identified. Paraspinal soft tissues are grossly normal.      Impression:      No acute cervical spine fracture. No significant interval change.      Electronically Signed: Brad Foster MD    1/6/2025 2:04 AM EST    Workstation ID: [id number]    CT Facial Bones Without Contrast [[id number]] Collected: 01/06/25 0154     Updated: 01/06/25 0200    Narrative:      CT FACIAL BONES WO CONTRAST    Date of Exam: 1/6/2025 1:09 AM EST    Indication: Fall with bruising.    Comparison: CT orbit 10/12/2018    Technique: Axial CT images were obtained from the inferior aspect of the mandible through the frontal sinuses without contrast administration.  Sagittal and coronal reconstructions were performed.  Automated exposure control and iterative reconstruction   methods were used.    Findings:  Temporomandibular joints demonstrate normal alignment. The mandible is intact. No acute facial bone fracture. No evidence of acute sinus disease. The globes and orbits appear grossly normal.      Impression:      Negative facial bone CT.        Electronically Signed: Brad Foster MD    1/6/2025 1:58 AM EST    Workstation ID: [id number]    CT Abdomen Pelvis Without Contrast [[id number]] Collected: 01/06/25 0144     Updated: 01/06/25 0158    Narrative:      CT ABDOMEN PELVIS WO CONTRAST    Date of Exam: 1/6/2025 1:09 AM EST    Indication: lower abdominal pain, diarrhea.    Comparison: CT abdomen pelvis August 5, 2024    Technique: Axial CT images were obtained of the abdomen and pelvis without the administration of contrast. Sagittal and coronal reconstructions were performed.  Automated exposure control and iterative reconstruction methods were used.    Findings:  Lung Bases:     There is mild right middle lobe atelectasis. There is bilateral basilar atelectasis. The heart is enlarged. There are postsurgical changes of the pulmonary artery. Heavy  coronary artery calcifications are seen.    Liver:  The liver is of normal size. There are no focal liver lesions with noncontrast technique.    Biliary/Gallbladder:    Layering density in the gallbladder may be stones or sludge. The biliary tree is normal.    Spleen:  There is mild splenomegaly.    Pancreas:    Pancreas is normal. There is no evidence of pancreatic mass or peripancreatic fluid.    Kidneys:    There is a nonobstructing stone of the left kidney. There is no hydronephrosis of either kidney.    Adrenals:    Adrenal glands are unremarkable.    Retroperitoneal/Lymph Nodes/Vasculature:    No retroperitoneal adenopathy is identified.    Gastrointestinal/Mesentery:    The bowel loops are non-dilated without wall thickening or mass. The appendix appears within normal limits. No evidence of obstruction. No free air. No mesenteric fluid collections identified.    Bladder:    The bladder is normal.    Genital:     Unremarkable          Bony Structures:     There are old compression fractures of T12 and L1. A benign hemangioma of L4 is unchanged. There is no acute fracture.        Impression:      Impression:  1.No acute abdominal or pelvic abnormality.  2.Mild splenomegaly.  3.Nonobstructing left renal stone.  4.Layering density in the gallbladder may be stones or sludge.                Electronically Signed: Rocky Heredia MD    1/6/2025 1:56 AM EST    Workstation ID: [id number]    CT Head Without Contrast [[id number]] Collected: 01/06/25 0148     Updated: 01/06/25 0156    Narrative:      CT HEAD WO CONTRAST    HISTORY:  Head injury from fall. Bruising.    TECHNIQUE:  Axial unenhanced head CT with coronal reformats. Radiation dose reduction techniques included automated exposure control or exposure modulation based on body size.    COMPARISON:  11/2/2024, 11/5/2023    FINDINGS:  Ventricular size and configuration are normal. No acute infarct or hemorrhage is identified. There are no masses. There is no skull  fracture. Atherosclerotic calcifications are present in the vertebral arteries and carotid siphons. Mild chronic small   vessel ischemic changes are present in the white matter.      Impression:      Senescent changes without acute abnormality.          Electronically Signed: Brad Foster MD    1/6/2025 1:54 AM EST    Workstation ID: [id number]    XR Chest 1 View [[id number]] Collected: 01/06/25 0036     Updated: 01/06/25 0040    Narrative:      XR CHEST 1 VW    Date of Exam: 1/6/2025 12:20 AM EST    Indication: cough, soa    Comparison: 11/2/2024    Findings:  There is a small stent graft in the proximal descending aorta. The heart remains enlarged status post CABG and valve repair. Left-sided multipolar pacer is present. Patient is status post mastectomy with bilateral breast reconstructions. Patient is also   status post cervical spinal fusion. No acute infiltrates are identified. No pneumothorax.      Impression:      1.Cardiomegaly with postoperative changes of CABG and valve repair.  2.No acute infiltrates identified.        Electronically Signed: Brad Foster MD    1/6/2025 12:38 AM EST    Workstation ID: [id number]              Assessment & Plan        This is a 64 y.o. female with:    Active and Resolved Problems  Active Hospital Problems    Diagnosis  POA    **JOSE (acute kidney injury) [N17.9]  Yes      Resolved Hospital Problems   No resolved problems to display.       Hypotension due to volume depletion  Diarrheal illness  Oliguric JOSE due to hypovolemia on the basis of diarrhea and vomiting  Melena by history  S/P 30 ml/kg IVF NS bolus in ED, remained borderline hypotensive and received 1 L NS  Given IV albumin 25 g once  Continue  ml/hr  Pending C diff testing, enteric panel  Will continue IV ceftriaxone and flagyl  Serial bladder scans, checking renal US R/o obstructive pathology  Holding losartan due to JOSE  heme-negative stool, no overt GI bleed on exam, Hb stable. Monitor for bleeding  Will  start CLD, advance diet as she tolerates  GI consultation    Possible UTI  UA on admission suggestive of contamination  Continue ceftriaxone       History breast cancer with bone metastasis  Thrombocytopenia  Was on Arimidex in the past was discontinued due to osteoporosis  Had intolerance to tamoxifen  She is currently on Abemeciclib outpatient, this has been considered to have alternate therapies given frequent UTIs as per pharmacy note on 12/10  Patient can follow outpatient with her primary oncologist Dr. Nunez to discuss alternative therapies  Monitor daily CBCs, monitor platelets  Continue Norco  mg q 4 PRN for moderate pain  Continue with intrathecal pump      History of complete heart block S/P PPM    VTE Prophylaxis:  Pharmacologic VTE prophylaxis orders are present.        The patient desires to be as follows:    CODE STATUS:       Full code    Lavon Garrison, who can be contacted at , is the designated person to make medical decisions on the patient's behalf if She is incapable of doing so. This was clarified with patient and/or next of kin on 2025 during the course of this H&P.    Admission Status:  I believe this patient meets inpatient status.    Expected Length of Stay: 2-3    PDMP and Medication Dispenses via Sidebar reviewed and consistent with patient reported medications.    I discussed the patient's findings and my recommendations with patient and nursing staff.      Signature:     This document has been electronically signed by Carlos Llanes Alvarez, MD on 2025 04:30 [facility]     Electronically signed by Llanes Alvarez, Carlos, MD at 25 0557       Operative/Procedure Notes (all)    No notes of this type exist for this encounter.          Physician Progress Notes (last 48 hours)        Bautista Chapman MD at 25 1326              [facility] MEDICINE SERVICE  DAILY PROGRESS NOTE    NAME: Gladys Garrison  : 1960  MRN:  [id number]      LOS: 5 days     PROVIDER OF SERVICE: Bautista Chapman MD    Chief Complaint: JOSE (acute kidney injury)    Subjective:     Interval History: Patient is doing better today, with improvement in her pain.  She was able to ambulate somewhat more today with her walker.  She is concerned about increased swelling of her legs.  She also ate much of her breakfast which is a significant improvement for her.    Review of Systems:   Review of Systems    Objective:     Vital Signs  Temp:  [97.6 °F (36.4 °C)-98.4 °F (36.9 °C)] 97.6 °F (36.4 °C)  Heart Rate:  [60-66] 60  Resp:  [16-20] 19  BP: (134-154)/(63-79) 134/79   Body mass index is 19.92 kg/m².    Physical Exam  General Appearance:  Alert, cooperative, no distress, appears stated age  Head:  Normocephalic, without obvious abnormality, atraumatic  Eyes:  PERRL, conjunctiva/corneas clear, EOM's intact, fundi benign, both eyes  Ears:  Normal TM's and external ear canals, both ears  Nose: Nares normal, septum midline, mucosa normal, no drainage or sinus tenderness  Throat: Lips, mucosa, and tongue normal; teeth and gums normal  Neck: Supple, symmetrical, trachea midline, no adenopathy, thyroid: not enlarged, symmetric, no tenderness/mass/nodules, no carotid bruit or JVD  Lungs:   Clear to auscultation bilaterally, respirations unlabored  Heart:  Regular rate and rhythm, S1, S2 normal, no murmur, rub or gallop  Abdomen:  Soft, mild diffuse TTP, bowel sounds active all four quadrants,  no masses, no organomegaly  Extremities: 1-2+ BL LE pitting edema   Pulses: 2+ and symmetric  Skin: Skin color, texture, turgor normal, no rashes or lesions  Neurologic: Normal        Scheduled Meds   amLODIPine, 5 mg, Oral, Q24H  cefTRIAXone, 2,000 mg, Intravenous, Q24H  furosemide, 20 mg, Oral, Daily  heparin (porcine), 5,000 Units, Subcutaneous, Q8H  insulin lispro, 2-7 Units, Subcutaneous, 4x Daily AC & at Bedtime  sodium bicarbonate, 650 mg, Oral, BID  sodium chloride, 10 mL,  Intravenous, Q12H  sodium chloride, 10 mL, Intravenous, Q12H       PRN Meds     Calcium Replacement - Follow Nurse / BPA Driven Protocol    dextrose    dextrose    glucagon (human recombinant)    Magnesium Standard Dose Replacement - Follow Nurse / BPA Driven Protocol    nitroglycerin    ondansetron    Phosphorus Replacement - Follow Nurse / BPA Driven Protocol    Potassium Replacement - Follow Nurse / BPA Driven Protocol    sodium chloride    sodium chloride    sodium chloride    sodium chloride    sodium chloride   Infusions           Diagnostic Data    Results from last 7 days   Lab Units 01/11/25  0523 01/10/25  0950 01/08/25  1514 01/08/25  0401   WBC 10*3/mm3  --  7.54  --  3.80   HEMOGLOBIN g/dL  --  9.3*  --  10.8*   HEMATOCRIT %  --  28.8*  --  33.7*   PLATELETS 10*3/mm3  --  53*  --  43*   GLUCOSE mg/dL 102*  --    < > 113*   CREATININE mg/dL 0.60  --    < > 0.86   BUN mg/dL 15  --    < > 39*   SODIUM mmol/L 136  --    < > 134*   POTASSIUM mmol/L 3.5  --    < > 3.6   AST (SGOT) U/L  --   --   --  33*   ALT (SGPT) U/L  --   --   --  14   ALK PHOS U/L  --   --   --  52   BILIRUBIN mg/dL  --   --   --  0.5   ANION GAP mmol/L 8.8  --    < > 7.3    < > = values in this interval not displayed.       No radiology results for the last day      I reviewed the patient's new clinical results.    Assessment/Plan:   Gladys Garrison is a 64 y.o. female with a PMH of breast cancer with metastasis to bone, complex cardiac medical history including tetralogy of Fallot, coarctation of aorta, VSD status post repair, coarctation of aorta S/P stent who [facility] on [facility] with diarrhea since around last Tuesday it is dark color, associated with nausea and multiple episodes of vomiting. States she has not been able to keep anything down, having generalized abdominal cramps, subjective fever and chills, generalized weakness. States she was treated for a UTI last month. States she has not urinated at  all in past 3-4 days. Denies hematuria or dysuria, no flank pain. She sustained a fall 2 days ago and hit her head and face, denies LOC. Workup in ER chemistry labs show creat 2.05, K 3.4, Na 135, albumin 3.2, T bili 1.4. CBC labs Hb 12.6, wbc 11.4, platelets 52. ED course notable for hypotension that slightly improved with IVF, temp 100.1. Saturating well on room air. The decision to admit was made.     Hypotension due to volume depletion  Diarrheal illness  Oliguric JOSE due to hypovolemia on the basis of diarrhea and vomiting, NSAIDS use  Melena by history  -Patient was initiated on IV fluids with improvement in renal function back to baseline  -Patient states her diarrhea has improved although her p.o. intake is still somewhat poor  -Added nutritional supplement with meals  -Continue IV antibiotics with ceftriaxone for bacteremia but discontinued Flagyl   -Holding all nephrotoxic medications including losartan given JOSE  -Advanced to GI soft diet  -Restarted Lasix given that patient now has some peripheral edema    Group B strep bacteremia with ICD lead vegetation  -Unclear source although this could be related to UTI  -Urine culture with 50,000 CFU GBS  -Repeat blood cultures from 1/8 with no growth  -ID consult appreciated  -Continue ceftriaxone  -GREGORY confirmed the vegetation  -Not a candidate for lead extraction given that patient is complete dependent on this device  -Plan for a 6-week course of IV antibiotics; PICC line placement    Possible UTI  -UA on admission suggestive of possible infection  -Continue ceftriaxone as above     History breast cancer with bone metastasis  Thrombocytopenia  -Was on Arimidex in the past was discontinued due to osteoporosis  -Had intolerance to tamoxifen  -She is currently on Abemeciclib outpatient, but this is being held during treatment for her acute infectious process  -Appreciate oncology consult  -Monitor daily CBCs, monitor platelets  -Continue Norco  mg q 4 PRN  for moderate pain  -Continue with intrathecal pain pump          VTE Prophylaxis:  Pharmacologic VTE prophylaxis orders are present.         Code status is   Code Status and Medical Interventions: CPR (Attempt to Resuscitate); Full Support   Ordered at: 25 1310     Code Status (Patient has no pulse and is not breathing):    CPR (Attempt to Resuscitate)     Medical Interventions (Patient has pulse or is breathing):    Full Support       Plan for disposition: Likely to SNF on     Time: 30 minutes    Part of this note may be an electronic transcription/translation of spoken language to printed text using the Dragon Dictation System.    Signature: Electronically signed by Bautista Chapman MD, 25, 13:26 EST.  [facility] Hospitalist Team     Electronically signed by Bautista Chapman MD at 25 1328       Bautista Chapman MD at 01/10/25 1443              [facility] MEDICINE SERVICE  DAILY PROGRESS NOTE    NAME: Gladys Garrison  : 1960  MRN: [id number]      LOS: 4 days     PROVIDER OF SERVICE: Bautista Chapman MD    Chief Complaint: JOSE (acute kidney injury)    Subjective:     Interval History: Patient states her abdominal pain is better although she still feels generally weak.  She did work with PT today and felt stronger.  She did have some dyspnea with exertion however.    Review of Systems:   Review of Systems    Objective:     Vital Signs  Temp:  [97.6 °F (36.4 °C)-98.7 °F (37.1 °C)] 97.8 °F (36.6 °C)  Heart Rate:  [60-82] 82  Resp:  [16-18] 18  BP: (107-134)/(45-57) 119/53   Body mass index is 19.92 kg/m².    Physical Exam  General Appearance:  Alert, cooperative, no distress, appears stated age  Head:  Normocephalic, without obvious abnormality, atraumatic  Eyes:  PERRL, conjunctiva/corneas clear, EOM's intact, fundi benign, both eyes  Ears:  Normal TM's and external ear canals, both ears  Nose: Nares normal, septum midline, mucosa normal, no drainage or sinus tenderness  Throat: Lips,  mucosa, and tongue normal; teeth and gums normal  Neck: Supple, symmetrical, trachea midline, no adenopathy, thyroid: not enlarged, symmetric, no tenderness/mass/nodules, no carotid bruit or JVD  Lungs:   Clear to auscultation bilaterally, respirations unlabored  Heart:  Regular rate and rhythm, S1, S2 normal, no murmur, rub or gallop  Abdomen:  Soft, mild diffuse TTP, bowel sounds active all four quadrants,  no masses, no organomegaly  Extremities: Extremities normal, atraumatic, no cyanosis or edema  Pulses: 2+ and symmetric  Skin: Skin color, texture, turgor normal, no rashes or lesions  Neurologic: Normal        Scheduled Meds   cefTRIAXone, 2,000 mg, Intravenous, Q24H  furosemide, 20 mg, Oral, Daily  heparin (porcine), 5,000 Units, Subcutaneous, Q8H  insulin lispro, 2-7 Units, Subcutaneous, 4x Daily AC & at Bedtime  sodium bicarbonate, 650 mg, Oral, BID  sodium chloride, 10 mL, Intravenous, Q12H  sodium chloride, 10 mL, Intravenous, Q12H       PRN Meds     Calcium Replacement - Follow Nurse / BPA Driven Protocol    dextrose    dextrose    glucagon (human recombinant)    Magnesium Standard Dose Replacement - Follow Nurse / BPA Driven Protocol    nitroglycerin    ondansetron    Phosphorus Replacement - Follow Nurse / BPA Driven Protocol    Potassium Replacement - Follow Nurse / BPA Driven Protocol    sodium chloride    sodium chloride    sodium chloride    sodium chloride    sodium chloride   Infusions           Diagnostic Data    Results from last 7 days   Lab Units 01/10/25  0950 01/10/25  0606 01/08/25  1514 01/08/25  0401   WBC 10*3/mm3 7.54  --   --  3.80   HEMOGLOBIN g/dL 9.3*  --   --  10.8*   HEMATOCRIT % 28.8*  --   --  33.7*   PLATELETS 10*3/mm3 53*  --   --  43*   GLUCOSE mg/dL  --  118*   < > 113*   CREATININE mg/dL  --  0.61   < > 0.86   BUN mg/dL  --  18   < > 39*   SODIUM mmol/L  --  135*   < > 134*   POTASSIUM mmol/L  --  4.1   < > 3.6   AST (SGOT) U/L  --   --   --  33*   ALT (SGPT) U/L  --   --    --  14   ALK PHOS U/L  --   --   --  52   BILIRUBIN mg/dL  --   --   --  0.5   ANION GAP mmol/L  --  8.2   < > 7.3    < > = values in this interval not displayed.       No radiology results for the last day      I reviewed the patient's new clinical results.    Assessment/Plan:   Gladys Garrison is a 64 y.o. female with a PMH of breast cancer with metastasis to bone, complex cardiac medical history including tetralogy of Fallot, coarctation of aorta, VSD status post repair, coarctation of aorta S/P stent who presented to [facility] on 1/5/2025 with diarrhea since around last Tuesday it is dark color, associated with nausea and multiple episodes of vomiting. States she has not been able to keep anything down, having generalized abdominal cramps, subjective fever and chills, generalized weakness. States she was treated for a UTI last month. States she has not urinated at all in past 3-4 days. Denies hematuria or dysuria, no flank pain. She sustained a fall 2 days ago and hit her head and face, denies LOC. Workup in ER chemistry labs show creat 2.05, K 3.4, Na 135, albumin 3.2, T bili 1.4. CBC labs Hb 12.6, wbc 11.4, platelets 52. ED course notable for hypotension that slightly improved with IVF, temp 100.1. Saturating well on room air. The decision to admit was made.     Hypotension due to volume depletion  Diarrheal illness  Oliguric JOSE due to hypovolemia on the basis of diarrhea and vomiting, NSAIDS use  Melena by history  -Patient was initiated on IV fluids with improvement in renal function back to baseline  -Patient states her diarrhea has improved although her p.o. intake is still somewhat poor  -Added nutritional supplement with meals  -Continue IV antibiotics with ceftriaxone for bacteremia but discontinued Flagyl   -Holding all nephrotoxic medications including losartan given JOSE  -Advanced to GI soft diet    Group B strep bacteremia with ICD lead vegetation  -Unclear source although this could  be related to UTI  -Urine culture with 50,000 CFU GBS  -Repeat blood cultures from 1/8 with no growth  -ID consult appreciated  -Continue ceftriaxone  -GREGORY confirmed the vegetation  -Not a candidate for lead extraction given that patient is complete dependent on this device  -Plan for a 6-week course of IV antibiotics; PICC line placement    Possible UTI  -UA on admission suggestive of possible infection  -Continue ceftriaxone as above        History breast cancer with bone metastasis  Thrombocytopenia  -Was on Arimidex in the past was discontinued due to osteoporosis  -Had intolerance to tamoxifen  -She is currently on Abemeciclib outpatient, but this is being held during treatment for her acute infectious process  -Appreciate oncology consult  -Monitor daily CBCs, monitor platelets  -Continue Norco  mg q 4 PRN for moderate pain  -Continue with intrathecal pain pump          VTE Prophylaxis:  Pharmacologic VTE prophylaxis orders are present.         Code status is   Code Status and Medical Interventions: CPR (Attempt to Resuscitate); Full Support   Ordered at: 01/07/25 1310     Code Status (Patient has no pulse and is not breathing):    CPR (Attempt to Resuscitate)     Medical Interventions (Patient has pulse or is breathing):    Full Support       Plan for disposition: Likely to SNF on 1/13    Time: 30 minutes    Part of this note may be an electronic transcription/translation of spoken language to printed text using the Dragon Dictation System.    Signature: Electronically signed by Bautista Chapman MD, 01/10/25, 14:43 EST.  [facility] Hospitalist Team     Electronically signed by Bautista Chapman MD at 01/10/25 1446       Bria Cooney APRN at 01/10/25 1356       Attestation signed by Anisha Pfeiffer MD at 01/11/25 1011    I have reviewed this documentation and agree.                  Infectious Diseases Progress Note      LOS: 4 days   Patient Care Team:  Chirag Robles DO as PCP - General  (Family Medicine)  Мария Nunez MD as Consulting Physician (Hematology and Oncology)  Steven Hammond MD as Consulting Physician (Cardiology)    Chief Complaint: Nausea, vomiting, generalized pain    Subjective       The patient has been afebrile for the last 24 hours.  The patient is on room air, hemodynamically stable, and is tolerating antimicrobial therapy.  Patient just generally does not feel well      Review of Systems:   Review of Systems   Constitutional: Negative.    HENT: Negative.     Eyes: Negative.    Respiratory: Negative.     Cardiovascular: Negative.    Gastrointestinal:  Positive for nausea.   Endocrine: Negative.    Genitourinary: Negative.    Musculoskeletal: Negative.         Generalized pain   Skin: Negative.    Neurological: Negative.    Psychiatric/Behavioral: Negative.     All other systems reviewed and are negative.       Objective     Vital Signs  Temp:  [97.6 °F (36.4 °C)-98.7 °F (37.1 °C)] 97.8 °F (36.6 °C)  Heart Rate:  [60-82] 82  Resp:  [16-18] 18  BP: (107-134)/(45-57) 119/53    Physical Exam:  Physical Exam  Vitals and nursing note reviewed.   Constitutional:       General: She is not in acute distress.     Appearance: She is well-developed and normal weight. She is ill-appearing. She is not diaphoretic.   HENT:      Head: Normocephalic and atraumatic.   Eyes:      General: No scleral icterus.     Extraocular Movements: Extraocular movements intact.      Conjunctiva/sclera: Conjunctivae normal.      Pupils: Pupils are equal, round, and reactive to light.   Cardiovascular:      Rate and Rhythm: Normal rate and regular rhythm.      Heart sounds: Normal heart sounds, S1 normal and S2 normal. No murmur heard.  Pulmonary:      Effort: Pulmonary effort is normal. No respiratory distress.      Breath sounds: Normal breath sounds. No stridor. No wheezing or rales.   Chest:      Chest wall: No tenderness.   Abdominal:      General: Bowel sounds are normal. There is no  distension.      Palpations: Abdomen is soft. There is no mass.      Tenderness: There is no abdominal tenderness. There is no guarding.   Musculoskeletal:         General: No swelling, tenderness or deformity. Normal range of motion.      Cervical back: Neck supple.   Skin:     General: Skin is warm and dry.      Coloration: Skin is not pale.      Findings: No bruising, erythema or rash.   Neurological:      General: No focal deficit present.      Mental Status: She is alert and oriented to person, place, and time. Mental status is at baseline.      Cranial Nerves: No cranial nerve deficit.   Psychiatric:         Mood and Affect: Mood normal.          Results Review:    I have reviewed all clinical data, test, lab, and imaging results.     Radiology  No Radiology Exams Resulted Within Past 24 Hours    Cardiology    Laboratory    Results from last 7 days   Lab Units 01/10/25  0950 01/08/25  0401 01/07/25  0453 01/06/25  0553 01/06/25  0022   WBC 10*3/mm3 7.54 3.80 4.64 4.89 11.40*   HEMOGLOBIN g/dL 9.3* 10.8* 10.7* 11.5* 12.6   HEMATOCRIT % 28.8* 33.7* 34.1 36.4 37.8   PLATELETS 10*3/mm3 53* 43* 36* 42* 52*     Results from last 7 days   Lab Units 01/10/25  0606 01/09/25  1212 01/08/25  1514 01/08/25  0401 01/07/25  0453 01/06/25  0553 01/06/25  0022   SODIUM mmol/L 135* 137  --  134* 135* 136 135*   POTASSIUM mmol/L 4.1 4.0 4.0 3.6 3.1* 3.2* 3.4*   CHLORIDE mmol/L 107 107  --  107 108* 106 100   CO2 mmol/L 19.8* 21.4*  --  19.7* 16.9* 16.6* 19.5*   BUN mg/dL 18 23  --  39* 50* 52* 56*   CREATININE mg/dL 0.61 0.74  --  0.86 1.09* 1.79* 2.05*   GLUCOSE mg/dL 118* 107*  --  113* 97 116* 117*   ALBUMIN g/dL  --   --   --  3.0* 2.8* 3.3* 3.2*   BILIRUBIN mg/dL  --   --   --  0.5 0.7 1.4* 1.4*   ALK PHOS U/L  --   --   --  52 39 43 64   AST (SGOT) U/L  --   --   --  33* 36* 36* 46*   ALT (SGPT) U/L  --   --   --  14 17 18 24   CALCIUM mg/dL 8.1* 8.5*  --  8.7 7.6* 7.7* 9.0                   [facility]  Results (last 10 days)       Procedure Component Value - Date/Time    Blood Culture - Blood, Hand, Right [[id number]]  (Normal) Collected: 01/08/25 1112    Lab Status: Preliminary result Specimen: Blood from Hand, Right Updated: 01/10/25 1131     Blood Culture No growth at 2 days    Urine Culture - Urine, Straight Cath [[id number]]  (Abnormal) Collected: 01/06/25 0035    Lab Status: Final result Specimen: Urine from Straight Cath Updated: 01/07/25 1035     Urine Culture 50,000 CFU/mL Streptococcus agalactiae (Group B)     Comment:   This organism is considered to be universally susceptible to penicillin.  No further antibiotic testing will be performed.       Narrative:      Colonization of the urinary tract without infection is common. Treatment is discouraged unless the patient is symptomatic, pregnant, or undergoing an invasive urologic procedure.    Blood Culture - Blood, Arm, Right [[id number]]  (Abnormal) Collected: 01/06/25 0033    Lab Status: Final result Specimen: Blood from Arm, Right Updated: 01/08/25 0707     Blood Culture Streptococcus agalactiae (Group B)     Isolated from Aerobic and Anaerobic Bottles     Gram Stain Anaerobic Bottle Gram positive cocci in pairs and chains      Aerobic Bottle Gram positive cocci in pairs and chains    Narrative:      Refer to previous blood culture collected on 01/06/2025 at 0032 for MICs.        Less than seven (7) mL's of blood was collected.  Insufficient quantity may yield false negative results.    Blood Culture - Blood, Arm, Left [[id number]]  (Abnormal)  (Susceptibility) Collected: 01/06/25 0032    Lab Status: Final result Specimen: Blood from Arm, Left Updated: 01/08/25 0706     Blood Culture Streptococcus agalactiae (Group B)     Isolated from Aerobic and Anaerobic Bottles     Gram Stain Anaerobic Bottle Gram positive cocci in pairs and chains      Aerobic Bottle Gram positive cocci in pairs and chains    Susceptibility        Streptococcus agalactiae (Group B)       MICK      Ceftriaxone Susceptible      Penicillin G Susceptible      Vancomycin Susceptible                           Blood Culture ID, PCR - Blood, Arm, Left [[id number]]  (Abnormal) Collected: 01/06/25 0032    Lab Status: Final result Specimen: Blood from Arm, Left Updated: 01/06/25 1200     BCID, PCR Streptococcus agalactiae (Group B). Identification by BCID2 PCR.     BOTTLE TYPE Anaerobic Bottle    Respiratory Panel PCR w/COVID-19(SARS-CoV-2) ELISHA/MARQUEZ/HAMMAD/PAD/COR/SHARDA In-House, NP Swab in UTM/VTM, 2 HR TAT - Swab, Nasopharynx [[id number]]  (Normal) Collected: 01/06/25 0024    Lab Status: Final result Specimen: Swab from Nasopharynx Updated: 01/06/25 0130     ADENOVIRUS, PCR Not Detected     Coronavirus 229E Not Detected     Coronavirus HKU1 Not Detected     Coronavirus NL63 Not Detected     Coronavirus OC43 Not Detected     COVID19 Not Detected     Human Metapneumovirus Not Detected     Human Rhinovirus/Enterovirus Not Detected     Influenza A PCR Not Detected     Influenza B PCR Not Detected     Parainfluenza Virus 1 Not Detected     Parainfluenza Virus 2 Not Detected     Parainfluenza Virus 3 Not Detected     Parainfluenza Virus 4 Not Detected     RSV, PCR Not Detected     Bordetella pertussis pcr Not Detected     Bordetella parapertussis PCR Not Detected     Chlamydophila pneumoniae PCR Not Detected     Mycoplasma pneumo by PCR Not Detected    Narrative:      In the setting of a positive respiratory panel with a viral infection PLUS a negative procalcitonin without other underlying concern for bacterial infection, consider observing off antibiotics or discontinuation of antibiotics and continue supportive care. If the respiratory panel is positive for atypical bacterial infection (Bordetella pertussis, Chlamydophila pneumoniae, or Mycoplasma pneumoniae), consider antibiotic de-escalation to target atypical bacterial infection.            Medication Review:       Schedule Meds  cefTRIAXone, 2,000 mg,  Intravenous, Q24H  furosemide, 20 mg, Oral, Daily  heparin (porcine), 5,000 Units, Subcutaneous, Q8H  insulin lispro, 2-7 Units, Subcutaneous, 4x Daily AC & at Bedtime  sodium bicarbonate, 650 mg, Oral, BID  sodium chloride, 10 mL, Intravenous, Q12H  sodium chloride, 10 mL, Intravenous, Q12H        Infusion Meds       PRN Meds    Calcium Replacement - Follow Nurse / BPA Driven Protocol    dextrose    dextrose    glucagon (human recombinant)    Magnesium Standard Dose Replacement - Follow Nurse / BPA Driven Protocol    nitroglycerin    ondansetron    Phosphorus Replacement - Follow Nurse / BPA Driven Protocol    Potassium Replacement - Follow Nurse / BPA Driven Protocol    sodium chloride    sodium chloride    sodium chloride    sodium chloride    sodium chloride        Assessment & Plan       Antimicrobial Therapy   1.  IV ceftriaxone        2.        3.        4.        5.          Assessment     Group B streptococcus bacteremia.    Patient has a pacemaker placed in the past and she had remote history of cardiac surgery.  I believe during childhood secondary to congenital heart disease.  GREGORY performed on 1/9/2025 and there was no vegetation of the heart valves but there was a lesion on one of the pacemaker leads consistent with vegetation versus thrombus.  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    Positive urine culture for group B streptococcus.  I believe this is descending infection.    Metastatic breast cancer currently on targeted biologic therapy (abemaciiclib)     Thrombocytopenia-likely related to the above     Plan     The case was discussed with cardiology service.  The patient is not pacer dependent and she is a poor candidate for removal of the pacemaker leads.  At this point we agreed to treat patient medically with 6 weeks of IV antibiotics.  Recommend 6 weeks of IV ceftriaxone 2 g daily.Last day on  25  Recommend to repeat GREGORY after 6 weeks    Patient will need a PICC line before discharge-please remove when IV antibiotics are completed.  Standard weekly PICC line care  Weekly labs CBC and creatinine x 6 weeks  Continue supportive care  Orders entered for ambulatory care labs and line care  Okay to discharge from ID standpoint when IV antibiotics are arranged  Not much more to add from infectious disease standpoint-we will sign off at this time-please call with any questions.    Please fax all post discharge lab results, imaging studies and correspondence to this fax number [phone]  For any question or concern please contact our service number [phone]    CIRO Hameed  01/10/25  13:56 EST    Note is dictated utilizing voice recognition software/Dragon    Electronically signed by Anisha Pfeiffer MD at 25 [phone]       Stanley Lopez MD at 01/10/25 1304          [facility] [place]        LOS:  LOS: 4 days   Patient Name: Gladys Garrison  Age/Sex: 64 y.o. female  : 1960  MRN: [id number]    Day of Service: 01/10/25   Length of Stay: 4  Encounter Provider: CIRO Fuentes  Place of Service: [facility] CARDIOLOGY  Patient Care Team:  Chirag Robles DO as PCP - General (Family Medicine)  Мария Nunez MD as Consulting Physician (Hematology and Oncology)  Steven Hammond MD as Consulting Physician (Cardiology)    Subjective:     Chief Complaint: f/u + BC, ? Vegetation vs. Thrombus on PPM    Subjective: no acute events, GREGORY yesterday, no vegetation but thrombus vs. Vegetation on PPM lead, some edema, adding lasix, no chest pain    Plan ultimately for continuation of antibiotics  Poor candidate for lead extraction  Follow blood cultures and if recurrent would recommend referral for laser lead extraction however she is pacemaker dependent and would require additional lead placement versus Micra leadless pacemaker  placement in the interim, decisions will need to be made at that time depending on outcome     Current Medications:   Scheduled Meds:cefTRIAXone, 2,000 mg, Intravenous, Q24H  furosemide, 20 mg, Oral, Daily  heparin (porcine), 5,000 Units, Subcutaneous, Q8H  insulin lispro, 2-7 Units, Subcutaneous, 4x Daily AC & at Bedtime  sodium bicarbonate, 650 mg, Oral, BID  sodium chloride, 10 mL, Intravenous, Q12H  sodium chloride, 10 mL, Intravenous, Q12H      Continuous Infusions:     Allergies:  Allergies   Allergen Reactions    Oxycodone Nausea And Vomiting    Promethazine Other (See Comments)     Hyperactive mean    Tape Other (See Comments)     .blisters      Adhesive Tape Rash    Flexeril [Cyclobenzaprine] Rash       Review of Systems   Constitutional: Negative for chills, diaphoresis and malaise/fatigue.   Cardiovascular:  Positive for leg swelling. Negative for chest pain, dyspnea on exertion, irregular heartbeat, near-syncope, orthopnea, palpitations, paroxysmal nocturnal dyspnea and syncope.   Respiratory:  Negative for cough, shortness of breath, sleep disturbances due to breathing and sputum production.    Gastrointestinal:  Negative for change in bowel habit.   Genitourinary:  Negative for urgency.   Neurological:  Negative for dizziness and headaches.   Psychiatric/Behavioral:  Negative for altered mental status.          Objective:     Temp:  [97.6 °F (36.4 °C)-98.7 °F (37.1 °C)] 97.8 °F (36.6 °C)  Heart Rate:  [60-82] 82  Resp:  [16-18] 18  BP: (107-134)/(45-57) 119/53     Intake/Output Summary (Last 24 hours) at 1/10/2025 1304  Last data filed at 1/9/2025 1700  Gross per 24 hour   Intake 240 ml   Output 0 ml   Net 240 ml     Body mass index is 19.92 kg/m².      01/05/25  2351 01/07/25  1153   Weight: 46.3 kg (102 lb) 46.3 kg (102 lb)         General Appearance:    Alert, cooperative, in no acute distress                                Head: Atraumatic, normocephalic, PERRLA               Neck:   supple,  "trachea midline, no thyromegaly, no carotid bruit, no JVD   Lungs:     Clear to auscultation, respirations regular, even and               unlabored    Heart:    Regular rhythm and normal rate, normal S1 and S2   Abdomen:     Normal bowel sounds, no masses, no organomegaly, soft  nontender, nondistended, no guarding, no rebound  tenderness   Extremities:   Moves all extremities well,  edema, no cyanosis, no  redness   Pulses:   Pulses palpable and equal bilaterally   Skin:   No bleeding, bruising or rash   Neurologic:   Awake, alert, oriented x3   Unchanged from prior      Lab Review:   Results from last 7 days   Lab Units 01/10/25  0606 01/09/25  1212 01/08/25  1514 01/08/25  0401 01/07/25  0453   SODIUM mmol/L 135* 137  --  134* 135*   POTASSIUM mmol/L 4.1 4.0   < > 3.6 3.1*   CHLORIDE mmol/L 107 107  --  107 108*   CO2 mmol/L 19.8* 21.4*  --  19.7* 16.9*   BUN mg/dL 18 23  --  39* 50*   CREATININE mg/dL 0.61 0.74  --  0.86 1.09*   GLUCOSE mg/dL 118* 107*  --  113* 97   CALCIUM mg/dL 8.1* 8.5*  --  8.7 7.6*   AST (SGOT) U/L  --   --   --  33* 36*   ALT (SGPT) U/L  --   --   --  14 17    < > = values in this interval not displayed.         Results from last 7 days   Lab Units 01/10/25  0950 01/08/25  0401   WBC 10*3/mm3 7.54 3.80   HEMOGLOBIN g/dL 9.3* 10.8*   HEMATOCRIT % 28.8* 33.7*   PLATELETS 10*3/mm3 53* 43*         Results from last 7 days   Lab Units 01/07/25  0453 01/06/25  0553   MAGNESIUM mg/dL 2.1 2.0           Invalid input(s): \"LDLCALC\"            Recent Radiology:  Imaging Results (Most Recent)       Procedure Component Value Units Date/Time    US Renal Bilateral [[id number]] Collected: 01/06/25 0932     Updated: 01/06/25 0935    Narrative:      US RENAL BILATERAL    Date of Exam: 1/6/2025 9:08 AM EST    Indication: JOSE, r/o obstruction.    Comparison: No comparisons available.    Technique: Grayscale and color Doppler ultrasound evaluation of the kidneys and urinary bladder was " performed.      Findings:  Right kidney measures 9.8 cm length. Left kidney measures 9.7 cm length. Both kidneys are normal in echogenicity. No hydronephrosis. Grossly unremarkable incompletely distended urinary bladder      Impression:      Impression:  Normal ultrasound appearance of the kidneys with no evidence of obstructive uropathy        Electronically Signed: Roque Penn    1/6/2025 9:33 AM EST    Workstation ID: [id number]    CT Cervical Spine Without Contrast [[id number]] Collected: 01/06/25 0158     Updated: 01/06/25 0206    Narrative:      CT CERVICAL SPINE WO CONTRAST    Date of Exam: 1/6/2025 1:09 AM EST    Indication: Neck pain after fall.    Comparison: 3/29/2023    Technique: Axial CT images were obtained of the cervical spine without contrast administration.  Sagittal and coronal reconstructions were performed.  Automated exposure control and iterative reconstruction methods were used.    Findings:  Patient is fused from C3-C7 with an anterior plate and screws from C3-C6. Cervical alignment is normal. There is multilevel facet arthropathy. There is disc space narrowing at C7-T1. There is scoliosis at the cervicothoracic junction. No acute cervical   spine fracture is identified. Paraspinal soft tissues are grossly normal.      Impression:      No acute cervical spine fracture. No significant interval change.      Electronically Signed: Brad Foster MD    1/6/2025 2:04 AM EST    Workstation ID: [id number]    CT Facial Bones Without Contrast [[id number]] Collected: 01/06/25 0154     Updated: 01/06/25 0200    Narrative:      CT FACIAL BONES WO CONTRAST    Date of Exam: 1/6/2025 1:09 AM EST    Indication: Fall with bruising.    Comparison: CT orbit 10/12/2018    Technique: Axial CT images were obtained from the inferior aspect of the mandible through the frontal sinuses without contrast administration.  Sagittal and coronal reconstructions were performed.  Automated exposure control and iterative  reconstruction   methods were used.    Findings:  Temporomandibular joints demonstrate normal alignment. The mandible is intact. No acute facial bone fracture. No evidence of acute sinus disease. The globes and orbits appear grossly normal.      Impression:      Negative facial bone CT.        Electronically Signed: Brda Foster MD    1/6/2025 1:58 AM EST    Workstation ID: [id number]    CT Abdomen Pelvis Without Contrast [[id number]] Collected: 01/06/25 0144     Updated: 01/06/25 0158    Narrative:      CT ABDOMEN PELVIS WO CONTRAST    Date of Exam: 1/6/2025 1:09 AM EST    Indication: lower abdominal pain, diarrhea.    Comparison: CT abdomen pelvis August 5, 2024    Technique: Axial CT images were obtained of the abdomen and pelvis without the administration of contrast. Sagittal and coronal reconstructions were performed.  Automated exposure control and iterative reconstruction methods were used.    Findings:  Lung Bases:     There is mild right middle lobe atelectasis. There is bilateral basilar atelectasis. The heart is enlarged. There are postsurgical changes of the pulmonary artery. Heavy coronary artery calcifications are seen.    Liver:  The liver is of normal size. There are no focal liver lesions with noncontrast technique.    Biliary/Gallbladder:    Layering density in the gallbladder may be stones or sludge. The biliary tree is normal.    Spleen:  There is mild splenomegaly.    Pancreas:    Pancreas is normal. There is no evidence of pancreatic mass or peripancreatic fluid.    Kidneys:    There is a nonobstructing stone of the left kidney. There is no hydronephrosis of either kidney.    Adrenals:    Adrenal glands are unremarkable.    Retroperitoneal/Lymph Nodes/Vasculature:    No retroperitoneal adenopathy is identified.    Gastrointestinal/Mesentery:    The bowel loops are non-dilated without wall thickening or mass. The appendix appears within normal limits. No evidence of obstruction. No free air. No  mesenteric fluid collections identified.    Bladder:    The bladder is normal.    Genital:     Unremarkable          Bony Structures:     There are old compression fractures of T12 and L1. A benign hemangioma of L4 is unchanged. There is no acute fracture.        Impression:      Impression:  1.No acute abdominal or pelvic abnormality.  2.Mild splenomegaly.  3.Nonobstructing left renal stone.  4.Layering density in the gallbladder may be stones or sludge.                Electronically Signed: Rocky Heredia MD    1/6/2025 1:56 AM EST    Workstation ID: [id number]    CT Head Without Contrast [[id number]] Collected: 01/06/25 0148     Updated: 01/06/25 0156    Narrative:      CT HEAD WO CONTRAST    HISTORY:  Head injury from fall. Bruising.    TECHNIQUE:  Axial unenhanced head CT with coronal reformats. Radiation dose reduction techniques included automated exposure control or exposure modulation based on body size.    COMPARISON:  11/2/2024, 11/5/2023    FINDINGS:  Ventricular size and configuration are normal. No acute infarct or hemorrhage is identified. There are no masses. There is no skull fracture. Atherosclerotic calcifications are present in the vertebral arteries and carotid siphons. Mild chronic small   vessel ischemic changes are present in the white matter.      Impression:      Senescent changes without acute abnormality.          Electronically Signed: Brad Foster MD    1/6/2025 1:54 AM EST    Workstation ID: [id number]    XR Chest 1 View [[id number]] Collected: 01/06/25 0036     Updated: 01/06/25 0040    Narrative:      XR CHEST 1 VW    Date of Exam: 1/6/2025 12:20 AM EST    Indication: cough, soa    Comparison: 11/2/2024    Findings:  There is a small stent graft in the proximal descending aorta. The heart remains enlarged status post CABG and valve repair. Left-sided multipolar pacer is present. Patient is status post mastectomy with bilateral breast reconstructions. Patient is also   status post cervical  spinal fusion. No acute infiltrates are identified. No pneumothorax.      Impression:      1.Cardiomegaly with postoperative changes of CABG and valve repair.  2.No acute infiltrates identified.        Electronically Signed: Brad Foster MD    1/6/2025 12:38 AM EST    Workstation ID: [id number]            ECHOCARDIOGRAM:    Results for orders placed during the hospital encounter of 01/05/25    Adult Transthoracic Echo Complete W/ Cont if Necessary Per Protocol    Interpretation Summary    Left ventricular systolic function is normal. Left ventricular ejection fraction appears to be 56 - 60%.    Left ventricular diastolic function is consistent with (grade II w/high LAP) pseudonormalization.    Moderately reduced right ventricular systolic function noted.    The right ventricular cavity is severely dilated.    The left atrial cavity is moderate to severely dilated.    Left atrial volume is moderately increased.    The right atrial cavity is mildly  dilated.    Moderate tricuspid valve regurgitation is present.    Estimated right ventricular systolic pressure from tricuspid regurgitation is moderately elevated (45-55 mmHg).    Moderate to severe pulmonary hypertension is present.    There is a bioprosthetic pulmonic valve present.        I reviewed the patient's new clinical results.    EKG:      Assessment:       JOSE (acute kidney injury)    Nausea / diarrhea   JOSE  Group B bacteremia- ID requesting GREGORY---no valvular vegetation but questionable Thrombus vs. 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  Under therapy  evaluation also pain management from oncology standpoint  She also history of COVID  she was admitted recently in 2023 with chest pain atypical nature thought to be secondary to metastatic disease  She has no history of obstructive CAD or coronary intervention       Plan:   GREGORY showed no valvular vegetation but ? Thrombus vs. Vegetation on one of her PPM leads  ID planning IV antibiotics x 6 weeks with repeat GREGORY in 6 weeks, agree  Follow-up blood cultures and if recurrent may need laser lead extraction of leads along with replacement of leads given she is dependent versus leadless pacemaker placement at that time  Repeat transesophageal echo after completion of antibiotics  Daily lasix added today    Continue to follow  Stanley Lopez MD, PhD    Zandra Quiroz, APRN  01/10/25  13:04 EST    Electronically signed by Stanley Lopez MD at 01/10/25 1416       Мария Nunez MD at 01/10/25 0724                Hematology/Oncology Inpatient Progress Note    PATIENT NAME: Gladys Garrison  : 1960  MRN: [id number]    CHIEF COMPLAINT: Nausea, vomiting, diarrhea    HISTORY OF PRESENT ILLNESS:      Gladys Garrison is a 64 y.o. female with past medical history significant for tetralogy of Fallot, coarctation of aorta, VSD status post repair, coarctation of aorta S/P stent who presented to [facility] on 2025 with complaints of generalized weakness.  He reports she has been having diarrhea for several days that is dark in color associated with nausea and multiple episodes of vomiting.  She has generalized abdominal cramps, subjective fever and chills and generalized weakness.  She also reports a fall, no loss of consciousness.  CT imaging of the head and facial bones with no acute findings or significant interval changes.  She was treated for a UTI about 1 month ago.  She also reports she has not urinated in several days.  Workup in ED significant for JOSE.  She was noted to have  hypotension that slightly improved with IV fluids, temperature of 100.1 °F.  She was admitted for further evaluation and workup.    1/6/2025 CT abdomen pelvis without contrast showed no acute abdominal or pelvic abnormality.  Mild splenomegaly.  Nonobstructing left renal stone.  Layering density in the gallbladder may be stones versus sludge.  Renal ultrasound was completed that was normal with no evidence of obstructive uropathy.    Gastroenterology was consulted and recommending stool studies for C. difficile and gastrointestinal panel.    Urine cultures completed.  Respiratory panel negative.  Preliminary blood cultures with gram-positive cocci in pairs and chains.  Infectious disease has been consulted.    01/07/25  Hematology/Oncology was consulted.  She is established with Dr. Nunez for history of metastatic breast cancer currently on exemestane/abemaciclib.     Patient has developed nausea vomiting along with diarrhea.  She denies fevers or chills.  She has been on abemaciclib.  She has not had any bleeding issues.  She still feels weak and ill      Subjective     Patient denies any issues overnight      ROS:    Review of Systems   Constitutional:  Positive for fatigue. Negative for chills and fever.   HENT:  Negative for congestion, drooling, ear discharge, rhinorrhea, sinus pressure and tinnitus.    Eyes:  Negative for photophobia, pain and discharge.   Respiratory:  Negative for apnea, choking and stridor.    Cardiovascular:  Negative for palpitations.   Gastrointestinal:  Negative for abdominal distention, abdominal pain and anal bleeding.   Endocrine: Negative for polydipsia and polyphagia.   Genitourinary:  Negative for decreased urine volume, flank pain and genital sores.   Musculoskeletal:  Negative for gait problem, neck pain and neck stiffness.   Skin:  Negative for color change, rash and wound.   Neurological:  Positive for weakness. Negative for tremors, seizures, syncope, facial asymmetry and  "speech difficulty.   Hematological:  Negative for adenopathy.   Psychiatric/Behavioral:  Negative for agitation, confusion, hallucinations and self-injury. The patient is not hyperactive.         MEDICATIONS:        Scheduled Meds:  cefTRIAXone, 2,000 mg, Intravenous, Q24H  heparin (porcine), 5,000 Units, Subcutaneous, Q8H  insulin lispro, 2-7 Units, Subcutaneous, 4x Daily AC & at Bedtime  sodium bicarbonate, 650 mg, Oral, BID  sodium chloride, 10 mL, Intravenous, Q12H  sodium chloride, 10 mL, Intravenous, Q12H       Continuous Infusions:      PRN Meds:    Calcium Replacement - Follow Nurse / BPA Driven Protocol    dextrose    dextrose    glucagon (human recombinant)    Magnesium Standard Dose Replacement - Follow Nurse / BPA Driven Protocol    nitroglycerin    ondansetron    Phosphorus Replacement - Follow Nurse / BPA Driven Protocol    Potassium Replacement - Follow Nurse / BPA Driven Protocol    sodium chloride    sodium chloride    sodium chloride    sodium chloride    sodium chloride     ALLERGIES:    Allergies   Allergen Reactions    Oxycodone Nausea And Vomiting    Promethazine Other (See Comments)     Hyperactive mean    Tape Other (See Comments)     .blisters      Adhesive Tape Rash    Flexeril [Cyclobenzaprine] Rash       Objective    VITALS:   /56 (BP Location: Left arm, Patient Position: Lying)   Pulse 68   Temp 97.8 °F (36.6 °C) (Oral)   Resp 18   Ht 152.4 cm (60\")   Wt 46.3 kg (102 lb)   LMP  (LMP Unknown)   SpO2 94%   BMI 19.92 kg/m²     PHYSICAL EXAM: (performed by MD)  Physical Exam  Vitals and nursing note reviewed.   Constitutional:       General: She is not in acute distress.     Appearance: She is not diaphoretic.   HENT:      Head: Normocephalic and atraumatic.   Eyes:      General: No scleral icterus.        Right eye: No discharge.         Left eye: No discharge.      Conjunctiva/sclera: Conjunctivae normal.   Neck:      Thyroid: No thyromegaly.   Cardiovascular:      Rate and " Rhythm: Normal rate and regular rhythm.      Heart sounds: Normal heart sounds.      No friction rub. No gallop.   Pulmonary:      Effort: Pulmonary effort is normal. No respiratory distress.      Breath sounds: No stridor. No wheezing.   Abdominal:      General: Bowel sounds are normal.      Palpations: Abdomen is soft. There is no mass.      Tenderness: There is no abdominal tenderness. There is no guarding or rebound.   Musculoskeletal:         General: No tenderness. Normal range of motion.      Cervical back: Normal range of motion and neck supple.   Lymphadenopathy:      Cervical: No cervical adenopathy.   Skin:     General: Skin is warm.      Findings: No erythema or rash.   Neurological:      Mental Status: She is alert and oriented to person, place, and time.      Motor: No abnormal muscle tone.   Psychiatric:         Behavior: Behavior normal.      I have reexamined the patient and the results are consistent with the previously documented exam. Мария Nunez MD       RECENT LABS:    Lab Results (last 24 hours)       Procedure Component Value Units Date/Time    Basic Metabolic Panel [[id number]]  (Abnormal) Collected: 01/10/25 0606    Specimen: Blood, Central Line Updated: 01/10/25 0647     Glucose 118 mg/dL      BUN 18 mg/dL      Creatinine 0.61 mg/dL      Sodium 135 mmol/L      Potassium 4.1 mmol/L      Comment: Specimen hemolyzed.  Result may be falsely elevated.        Chloride 107 mmol/L      CO2 19.8 mmol/L      Calcium 8.1 mg/dL      BUN/Creatinine Ratio 29.5     Anion Gap 8.2 mmol/L      eGFR 101.2 mL/min/1.73     Narrative:      GFR Categories in Chronic Kidney Disease (CKD)      GFR Category          GFR (mL/min/1.73)    Interpretation  G1                     90 or greater         Normal or high (1)  G2                      60-89                Mild decrease (1)  G3a                   45-59                Mild to moderate decrease  G3b                   30-44                Moderate to  severe decrease  G4                    15-29                Severe decrease  G5                    14 or less           Kidney failure          (1)In the absence of evidence of kidney disease, neither GFR category G1 or G2 fulfill the criteria for CKD.    eGFR calculation 2021 CKD-EPI creatinine equation, which does not include race as a factor    CBC & Differential [[id number]] Collected: 01/10/25 0606    Specimen: Blood, Central Line Updated: 01/10/25 0628    Narrative:      The following orders were created for panel order CBC & Differential.  Procedure                               Abnormality         Status                     ---------                               -----------         ------                     CBC Auto Differential[[id number]]                                                       Scan Slide[[id number]]                                                                    Please view results for these tests on the individual orders.    CBC Auto Differential [[id number]] Updated: 01/10/25 0628    Specimen: Blood, Central Line     POC Glucose Once [[id number]]  (Abnormal) Collected: 01/09/25 2010    Specimen: Blood Updated: 01/09/25 2012     Glucose 116 mg/dL      Comment: Serial Number: [id number]:  [id number]       Basic Metabolic Panel [[id number]]  (Abnormal) Collected: 01/09/25 1212    Specimen: Blood Updated: 01/09/25 1248     Glucose 107 mg/dL      BUN 23 mg/dL      Creatinine 0.74 mg/dL      Sodium 137 mmol/L      Potassium 4.0 mmol/L      Comment: Specimen hemolyzed.  Result may be falsely elevated.        Chloride 107 mmol/L      CO2 21.4 mmol/L      Calcium 8.5 mg/dL      BUN/Creatinine Ratio 31.1     Anion Gap 8.6 mmol/L      eGFR 91.6 mL/min/1.73     Narrative:      GFR Categories in Chronic Kidney Disease (CKD)      GFR Category          GFR (mL/min/1.73)    Interpretation  G1                     90 or greater         Normal or high (1)  G2                      60-89                 Mild decrease (1)  G3a                   45-59                Mild to moderate decrease  G3b                   30-44                Moderate to severe decrease  G4                    15-29                Severe decrease  G5                    14 or less           Kidney failure          (1)In the absence of evidence of kidney disease, neither GFR category G1 or G2 fulfill the criteria for CKD.    eGFR calculation 2021 CKD-EPI creatinine equation, which does not include race as a factor    POC Glucose 4x Daily Before Meals & at Bedtime [[id number]]  (Normal) Collected: 01/09/25 1140    Specimen: Blood Updated: 01/09/25 1142     Glucose 95 mg/dL      Comment: Serial Number: [id number]:  [id number]       Blood Culture - Blood, Hand, Right [[id number]]  (Normal) Collected: 01/08/25 1112    Specimen: Blood from Hand, Right Updated: 01/09/25 1131     Blood Culture No growth at 24 hours    POC Glucose 4x Daily Before Meals & at Bedtime [[id number]]  (Normal) Collected: 01/09/25 0824    Specimen: Blood Updated: 01/09/25 0825     Glucose 99 mg/dL      Comment: Serial Number: [id number]:  [id number]               PENDING RESULTS:     IMAGING REVIEWED:  No radiology results for the last day    Assessment & Plan     ASSESSMENT:    :Metastatic breast cancer presenting as 1.1 cm mixed lytic/sclerotic hypermetabolic lesion in the left hemisacrum suspicious for metastatic disease 1.2 cm sclerotic lesion on L4, small L3 lesion and T11 lesion.  Tumor is ER positive, CA positive and HER-2/bishop negative.  Currently on treatment with abemaciclib (100 mg daily - recent dose reduction due to thrombocytopenia and recurrent UTIs) and exemestane 25 mg daily.  Will continue to hold Abemaciclib.  Thrombocytopenia secondary to abemaciclib, bacteremia: Platelets 52,000 upon admission.  Today down to 36,000.  Acute drop may be secondary to acute infection.  Continue to monitor closely..  Her platelets are up to 53,000.  Hypotension due to  volume depletion, JOSE, diarrhea: nephrology following. Supportive care and infectious workup per primary team.  Group B streptococcal bacteremia with possible UTI: Empirically started on ceftriaxone     PLAN  Continue supportive care per primary team and infectious disease  Continue IV antibiotics for 6 weeks  GREGORY reviewed. Thrombus versus vegetation on pacemaker leads Repeat GREGORY in 6 weeks post antibiotics per ID and cardiology recs   Will hold abemaciclib until clinical improvement.   Daily CBCs  Discussed with patient           Electronically signed by Мария Nunez MD, 01/10/25, 7:30 PM EST.         Electronically signed by Мария Nunez MD at 01/10/25 1930       Brian Wilson MD at 01/10/25 0692          RENAL/KCC:     LOS: 4 days   Patient Care Team:  Chirag Robles DO as PCP - General (Family Medicine)  Мария Nunez MD as Consulting Physician (Hematology and Oncology)  Steven Hammond MD as Consulting Physician (Cardiology)    Chief Complaint:  JOSE    Subjective     Interval History:   Chart reviewed  C/O LE edema    Objective     Vital Signs  Temp:  [97.4 °F (36.3 °C)-98.7 °F (37.1 °C)] 97.8 °F (36.6 °C)  Heart Rate:  [60-69] 68  Resp:  [15-18] 18  BP: (106-173)/(45-73) 134/56    Physical Exam:  GEN: Awake, NAD  ENT: PERRL, EOMI, MMM  NECK: Supple, no JVD  CHEST: CTAB, no W/R/C  CV: RRR, no M/G/R, +edema  ABD: Soft, NT, +BS  SKIN: Warm and Dry  NEURO: CN's intact       Results Review:     I reviewed the patient's new clinical results.    Medication Review: Reviewed    Assessment & Plan     JOSE  UTI  Proteinuria  Metabolic acidosis  Bacteremia  Edema    Plan: Cr improved to 0.6.  Re-evaluate proteinuria outpatient.  ABX per ID.  Add low-dose daily Lasix.  Will follow along.      Brian Wilson MD  [facility]  01/10/25  06:48 EST          Electronically signed by Brian Wilson MD at 01/10/25 1204       Мария Nunez  MD Lian at 25 1816                Hematology/Oncology Inpatient Progress Note    PATIENT NAME: Gladys Garrison  : 1960  MRN: [id number]    CHIEF COMPLAINT: Nausea, vomiting, diarrhea    HISTORY OF PRESENT ILLNESS:      Gladys Garrison is a 64 y.o. female with past medical history significant for tetralogy of Fallot, coarctation of aorta, VSD status post repair, coarctation of aorta S/P stent who presented to [facility] on 2025 with complaints of generalized weakness.  He reports she has been having diarrhea for several days that is dark in color associated with nausea and multiple episodes of vomiting.  She has generalized abdominal cramps, subjective fever and chills and generalized weakness.  She also reports a fall, no loss of consciousness.  CT imaging of the head and facial bones with no acute findings or significant interval changes.  She was treated for a UTI about 1 month ago.  She also reports she has not urinated in several days.  Workup in ED significant for JOSE.  She was noted to have hypotension that slightly improved with IV fluids, temperature of 100.1 °F.  She was admitted for further evaluation and workup.    2025 CT abdomen pelvis without contrast showed no acute abdominal or pelvic abnormality.  Mild splenomegaly.  Nonobstructing left renal stone.  Layering density in the gallbladder may be stones versus sludge.  Renal ultrasound was completed that was normal with no evidence of obstructive uropathy.    Gastroenterology was consulted and recommending stool studies for C. difficile and gastrointestinal panel.    Urine cultures completed.  Respiratory panel negative.  Preliminary blood cultures with gram-positive cocci in pairs and chains.  Infectious disease has been consulted.    25  Hematology/Oncology was consulted.  She is established with Dr. Nunez for history of metastatic breast cancer currently on exemestane/abemaciclib.     Patient has developed  nausea vomiting along with diarrhea.  She denies fevers or chills.  She has been on abemaciclib.  She has not had any bleeding issues.  She still feels weak and ill      Subjective     Denies any issues    ROS:  Review of Systems   Constitutional:  Positive for fatigue. Negative for chills and fever.   HENT:  Negative for congestion, drooling, ear discharge, rhinorrhea, sinus pressure and tinnitus.    Eyes:  Negative for photophobia, pain and discharge.   Respiratory:  Negative for apnea, choking and stridor.    Cardiovascular:  Negative for palpitations.   Gastrointestinal:  Negative for abdominal distention, abdominal pain and anal bleeding.   Endocrine: Negative for polydipsia and polyphagia.   Genitourinary:  Negative for decreased urine volume, flank pain and genital sores.   Musculoskeletal:  Negative for gait problem, neck pain and neck stiffness.   Skin:  Negative for color change, rash and wound.   Neurological:  Positive for weakness. Negative for tremors, seizures, syncope, facial asymmetry and speech difficulty.   Hematological:  Negative for adenopathy.   Psychiatric/Behavioral:  Negative for agitation, confusion, hallucinations and self-injury. The patient is not hyperactive.         MEDICATIONS:        Scheduled Meds:  cefTRIAXone, 2,000 mg, Intravenous, Q24H  heparin (porcine), 5,000 Units, Subcutaneous, Q8H  insulin lispro, 2-7 Units, Subcutaneous, 4x Daily AC & at Bedtime  sodium chloride, 10 mL, Intravenous, Q12H  sodium chloride, 10 mL, Intravenous, Q12H       Continuous Infusions:      PRN Meds:    Calcium Replacement - Follow Nurse / BPA Driven Protocol    dextrose    dextrose    glucagon (human recombinant)    Magnesium Standard Dose Replacement - Follow Nurse / BPA Driven Protocol    nitroglycerin    ondansetron    Phosphorus Replacement - Follow Nurse / BPA Driven Protocol    Potassium Replacement - Follow Nurse / BPA Driven Protocol    sodium chloride    sodium chloride    sodium chloride    " sodium chloride    sodium chloride     ALLERGIES:    Allergies   Allergen Reactions    Oxycodone Nausea And Vomiting    Promethazine Other (See Comments)     Hyperactive mean    Tape Other (See Comments)     .blisters      Adhesive Tape Rash    Flexeril [Cyclobenzaprine] Rash       Objective    VITALS:   /52 (BP Location: Left arm, Patient Position: Sitting)   Pulse 67   Temp 97.6 °F (36.4 °C) (Oral)   Resp 18   Ht 152.4 cm (60\")   Wt 46.3 kg (102 lb)   LMP  (LMP Unknown)   SpO2 100%   BMI 19.92 kg/m²     PHYSICAL EXAM: (performed by MD)  Physical Exam  Vitals and nursing note reviewed.   Constitutional:       General: She is not in acute distress.     Appearance: She is not diaphoretic.   HENT:      Head: Normocephalic and atraumatic.   Eyes:      General: No scleral icterus.        Right eye: No discharge.         Left eye: No discharge.      Conjunctiva/sclera: Conjunctivae normal.   Neck:      Thyroid: No thyromegaly.   Cardiovascular:      Rate and Rhythm: Normal rate and regular rhythm.      Heart sounds: Normal heart sounds.      No friction rub. No gallop.   Pulmonary:      Effort: Pulmonary effort is normal. No respiratory distress.      Breath sounds: No stridor. No wheezing.   Abdominal:      General: Bowel sounds are normal.      Palpations: Abdomen is soft. There is no mass.      Tenderness: There is no abdominal tenderness. There is no guarding or rebound.   Musculoskeletal:         General: No tenderness. Normal range of motion.      Cervical back: Normal range of motion and neck supple.   Lymphadenopathy:      Cervical: No cervical adenopathy.   Skin:     General: Skin is warm.      Findings: No erythema or rash.   Neurological:      Mental Status: She is alert and oriented to person, place, and time.      Motor: No abnormal muscle tone.   Psychiatric:         Behavior: Behavior normal.       I have reexamined the patient and the results are consistent with the previously documented " exam. Мария Lian Nunez MD      RECENT LABS:  Lab Results (last 24 hours)       Procedure Component Value Units Date/Time    Basic Metabolic Panel [[id number]]  (Abnormal) Collected: 01/09/25 1212    Specimen: Blood Updated: 01/09/25 1248     Glucose 107 mg/dL      BUN 23 mg/dL      Creatinine 0.74 mg/dL      Sodium 137 mmol/L      Potassium 4.0 mmol/L      Comment: Specimen hemolyzed.  Result may be falsely elevated.        Chloride 107 mmol/L      CO2 21.4 mmol/L      Calcium 8.5 mg/dL      BUN/Creatinine Ratio 31.1     Anion Gap 8.6 mmol/L      eGFR 91.6 mL/min/1.73     Narrative:      GFR Categories in Chronic Kidney Disease (CKD)      GFR Category          GFR (mL/min/1.73)    Interpretation  G1                     90 or greater         Normal or high (1)  G2                      60-89                Mild decrease (1)  G3a                   45-59                Mild to moderate decrease  G3b                   30-44                Moderate to severe decrease  G4                    15-29                Severe decrease  G5                    14 or less           Kidney failure          (1)In the absence of evidence of kidney disease, neither GFR category G1 or G2 fulfill the criteria for CKD.    eGFR calculation 2021 CKD-EPI creatinine equation, which does not include race as a factor    POC Glucose 4x Daily Before Meals & at Bedtime [[id number]]  (Normal) Collected: 01/09/25 1140    Specimen: Blood Updated: 01/09/25 1142     Glucose 95 mg/dL      Comment: Serial Number: [id number]:  [id number]       Blood Culture - Blood, Hand, Right [[id number]]  (Normal) Collected: 01/08/25 1112    Specimen: Blood from Hand, Right Updated: 01/09/25 1131     Blood Culture No growth at 24 hours    POC Glucose 4x Daily Before Meals & at Bedtime [[id number]]  (Normal) Collected: 01/09/25 0824    Specimen: Blood Updated: 01/09/25 0825     Glucose 99 mg/dL      Comment: Serial Number: [id number]:  [id number]       POC  Glucose Once [[id number]]  (Abnormal) Collected: 01/08/25 2044    Specimen: Blood Updated: 01/08/25 2046     Glucose 129 mg/dL      Comment: Serial Number: [id number]:  [id number]               PENDING RESULTS:     IMAGING REVIEWED:  No radiology results for the last day    Assessment & Plan   ASSESSMENT:    :Metastatic breast cancer presenting as 1.1 cm mixed lytic/sclerotic hypermetabolic lesion in the left hemisacrum suspicious for metastatic disease 1.2 cm sclerotic lesion on L4, small L3 lesion and T11 lesion.  Tumor is ER positive, LA positive and HER-2/bishop negative.  Currently on treatment with abemaciclib (100 mg daily - recent dose reduction due to thrombocytopenia and recurrent UTIs) and exemestane 25 mg daily.  Continue to hold  abemaciclib  Thrombocytopenia secondary to abemaciclib: Platelets 52,000 upon admission.  Today down to 36,000.  Acute drop may be secondary to acute infection.  Continue to monitor closely..  Platelets up to 40,000.  She has no bleeding issues  Hypotension due to volume depletion, JOSE, diarrhea: nephrology following. Supportive care and infectious workup per primary team.  Group B streptococcal bacteremia with possible UTI: Empirically started on ceftriaxone     PLAN  Continue supportive care per primary team and infectious disease  Continue IV antibiotics  GREGORY today  GI panel  Will hold abemaciclib until clinical improvement.   Daily CBCs  Discussed with patient             Electronically signed by Мария Nunez MD, 01/10/25, 7:54 AM EST.         Electronically signed by Мария Nunez MD at 01/10/25 5780       Consult Notes (last 48 hours)  Notes from 01/09/25 1635 through 01/11/25 1635   No notes of this type exist for this encounter.          Nutrition Notes (most recent note)        Taylor Camilo RD at 01/09/25 1542          Nutrition Services  Patient Name: Gladys Garrison  YOB: 1960  MRN: [id number]  Admission date: 1/5/2025    *See  MSA at bottom of this note.     Severe chronic disease related malnutrition R/t catabolism in the setting of intakes not meeting energy expenditure, combined with potential decreased assimilation of consumed nutrients; as evidenced by weight loss greater than 7.5% in three months, with severe muscle and fat wasting, and intakes meeting less than 75% estimated needs for greater than one month.     At this point will hold off on addition of ONS to avoid creating aversion - pt still with significant GI distress/diarrhea.     Will continue current diet as tolerated - will maintain consistent carbohydrate modification given recently elevated A1C, but if intake declines, may consider liberalizing this further.     NUTRITION SCREENING      Trending Narrative: 1/9: Pt assessed due to concern for decreased appetite and reported weight change. Pt with ongoing diarrhea and GI distress, which is contributing to overall diminished appetite. This has contributed to ongoing limited intake with associated 14.9% weight loss in three months, and assessment C/W clinical malnutrition. Some of this may also be connected to hypermetabolism/catabolism with current breast CA and associated Tx. When feeling better/tolerating PO better, could be a good candidate for ONS.       PO Diet: Diet: Diabetic; Consistent Carbohydrate; Fluid Consistency: Thin (IDDSI 0)   PO Supplements: None ordered    Trending PO Intake:  Variable; averaging about 50% at recent meals        Nutritionally-Pertinent Medications RDN Reviewed, C/W clinical course         Labs (reviewed below): Reviewed      Results from last 7 days   Lab Units 01/09/25  1212 01/08/25  1514 01/08/25  0401 01/07/25  0453 01/06/25  0553   SODIUM mmol/L 137  --  134* 135* 136   POTASSIUM mmol/L 4.0 4.0 3.6 3.1* 3.2*   CHLORIDE mmol/L 107  --  107 108* 106   CO2 mmol/L 21.4*  --  19.7* 16.9* 16.6*   BUN mg/dL 23  --  39* 50* 52*   CREATININE mg/dL 0.74  --  0.86 1.09* 1.79*   CALCIUM mg/dL  "8.5*  --  8.7 7.6* 7.7*   BILIRUBIN mg/dL  --   --  0.5 0.7 1.4*   ALK PHOS U/L  --   --  52 39 43   ALT (SGPT) U/L  --   --  14 17 18   AST (SGOT) U/L  --   --  33* 36* 36*   GLUCOSE mg/dL 107*  --  113* 97 116*     Results from last 7 days   Lab Units 01/08/25  1514 01/08/25  0401 01/07/25  0453 01/06/25  0553   MAGNESIUM mg/dL  --   --  2.1 2.0   PHOSPHORUS mg/dL 2.4* 1.7* 2.0* 2.4*   HEMOGLOBIN g/dL  --  10.8* 10.7* 11.5*   HEMATOCRIT %  --  33.7* 34.1 36.4     Lab Results   Component Value Date    HGBA1C 6.20 (H) 09/25/2023          GI Function:  BM 1/7          Skin: No PI documented        Weight Review: Estimated body mass index is 19.92 kg/m² as calculated from the following:    Height as of this encounter: 152.4 cm (60\").    Weight as of this encounter: 46.3 kg (102 lb).    Comment:   1/7: Scale weight 46.3 kg - down 14.9% in three months - significant     Wt Readings from Last 30 Encounters:   01/07/25 1153 46.3 kg (102 lb)   01/05/25 2351 46.3 kg (102 lb)   01/03/25 0936 54 kg (119 lb)   12/04/24 1516 54.4 kg (120 lb)   12/03/24 1444 53.5 kg (118 lb)   11/02/24 1407 53.5 kg (118 lb)   10/09/24 1507 54.4 kg (120 lb)   10/03/24 1606 54 kg (119 lb)   09/27/24 1210 55 kg (121 lb 3.2 oz)   09/19/24 1501 53.4 kg (117 lb 12.8 oz)   09/11/24 1559 55.3 kg (122 lb)   08/30/24 1357 54.4 kg (120 lb)   08/24/24 1146 54.7 kg (120 lb 9.5 oz)   08/23/24 0948 53.5 kg (118 lb)   07/25/24 1055 53 kg (116 lb 12.8 oz)   07/22/24 1444 53.4 kg (117 lb 11.6 oz)   07/01/24 1445 53.4 kg (117 lb 12.8 oz)   06/24/24 1256 53.5 kg (118 lb)   05/15/24 1117 55.3 kg (122 lb)   05/10/24 1402 55.3 kg (122 lb)   05/09/24 0832 58.5 kg (129 lb)   05/08/24 1041 55.3 kg (122 lb)   04/15/24 1327 55.8 kg (123 lb)   03/28/24 1540 54.9 kg (121 lb)   03/27/24 1057 53.8 kg (118 lb 8 oz)   03/25/24 1648 57.6 kg (126 lb 15.8 oz)   03/25/24 1554 57.6 kg (126 lb 15.8 oz)   03/19/24 1329 57.6 kg (127 lb)   02/20/24 0807 59.1 kg (130 lb 6.4 oz)   01/22/24 " 1255 61.2 kg (135 lb)   01/22/24 1148 60.8 kg (134 lb)              Trending Physical   Appearance, NFPE 1/9: NFPE completed, consistent with nutrition diagnosis of malnutrition using AND/ASPEN criteria. See MSA below.             Nutrition Problem Statement: Severe chronic disease related malnutrition R/t catabolism in the setting of intakes not meeting energy expenditure, combined with potential decreased assimilation of consumed nutrients; as evidenced by weight loss greater than 7.5% in three months, with severe muscle and fat wasting, and intakes meeting less than 75% estimated needs for greater than one month.         Nutrition Intervention: At this point will hold off on addition of ONS to avoid creating aversion - pt still with significant GI distress/diarrhea.     Will continue current diet as tolerated - will maintain consistent carbohydrate modification given recently elevated A1C, but if intake declines, may consider liberalizing this further.           Monitoring/Evaluation PO intake, Pertinent labs, Weight, Skin status, GI status, POC/GOC        Malnutrition Severity Assessment      Patient meets criteria for : Severe Malnutrition  Malnutrition Type (Last 8 Hours)       Malnutrition Severity Assessment       Row Name 01/10/25 1748       Malnutrition Severity Assessment    Malnutrition Type Chronic Disease - Related Malnutrition      Row Name 01/10/25 1748       Insufficient Energy Intake     Insufficient Energy Intake Findings Severe    Insufficient Energy Intake  <75% of est. energy requirement for > or equal to 1 month      Row Name 01/10/25 1748       Unintentional Weight Loss     Unintentional Weight Loss Findings Severe    Unintentional Weight Loss  Weight loss greater than 7.5% in three months      Row Name 01/10/25 1748       Muscle Loss    Loss of Muscle Mass Findings Severe    [place] Region Severe - deep hollowing/scooping, lack of muscle to touch, facial bones well defined    Clavicle Bone  Region Severe - protruding prominent bone    Acromion Bone Region Severe - squared shoulders, bones, and acromion process protrusion prominent    Patellar Region Severe - prominent bone, square looking, very little muscle definition    Anterior Thigh Region Severe - line/depression along thigh, obviously thin    Posterior Calf Region Severe - thin with very little definition/firmness      Row Name 01/10/25 1748       Fat Loss    Subcutaneous Fat Loss Findings Severe    Orbital Region  Severe - pronounced hollowness/depression, dark circles, loose saggy skin      Row Name 01/10/25 1747       Criteria Met (Must meet criteria for severity in at least 2 of these categories: M Wasting, Fat Loss, Fluid, Secondary Signs, Wt. Status, Intake)    Patient meets criteria for  Severe Malnutrition                         RD to follow up per protocol.    Electronically signed by:  Taylor Camilo RD  01/09/25 15:43 EST        Electronically signed by Taylor Camilo RD at 01/10/25 1743       Speech Language Pathology Notes (most recent note)    No notes exist for this encounter.

## 2025-01-11 NOTE — TREATMENT PLAN
Subjective: Pt agreeable to therapeutic plan of care. Declines mobility this date reporting she has been up ad alex with RW and ate more this date but is too fatigued at the moment.     Objective:     Precautions - Falls    Bed mobility - N/A or Not attempted.  Transfers - N/A or Not attempted.  Ambulation -  N/A or Not attempted.      Education: Provided education on the importance of mobility in the acute care setting and Energy conservation strategies    Assessment: Gladys Garrison presents with functional mobility impairments which indicate the need for skilled intervention. Continue to recommend rehab based on previous tx note and Pt below baseline at this time. Education provided this date on OOB ax throughout hospital stay. Tolerating session today without incident. Will continue to follow and progress as tolerated.

## 2025-01-11 NOTE — PLAN OF CARE
Goal Outcome Evaluation:                 Potassium was 3.5  so  replaced with po x 2 doses per protocol . Resulted in  3.6    Continue replacement  w/ pot powder this time per pt request. Will recheck after completion protocol.

## 2025-01-11 NOTE — PLAN OF CARE
Goal Outcome Evaluation:              Outcome Evaluation: Patient slept well during the night with  at bedside.  Currently needing 6 wk of IV antibiotics.  Pt needs SNF waiting on placement decision.  No other needs voiced during the night. Pt stable at this time.

## 2025-01-12 LAB
ANION GAP SERPL CALCULATED.3IONS-SCNC: 6.9 MMOL/L (ref 5–15)
BUN SERPL-MCNC: 11 MG/DL (ref 8–23)
BUN/CREAT SERPL: 21.6 (ref 7–25)
CALCIUM SPEC-SCNC: 8.2 MG/DL (ref 8.6–10.5)
CHLORIDE SERPL-SCNC: 103 MMOL/L (ref 98–107)
CO2 SERPL-SCNC: 24.1 MMOL/L (ref 22–29)
CREAT SERPL-MCNC: 0.51 MG/DL (ref 0.57–1)
EGFRCR SERPLBLD CKD-EPI 2021: 105.7 ML/MIN/1.73
GLUCOSE BLDC GLUCOMTR-MCNC: 113 MG/DL (ref 70–105)
GLUCOSE BLDC GLUCOMTR-MCNC: 130 MG/DL (ref 70–105)
GLUCOSE BLDC GLUCOMTR-MCNC: 152 MG/DL (ref 70–105)
GLUCOSE BLDC GLUCOMTR-MCNC: 155 MG/DL (ref 70–105)
GLUCOSE SERPL-MCNC: 111 MG/DL (ref 65–99)
POTASSIUM SERPL-SCNC: 3.9 MMOL/L (ref 3.5–5.2)
SODIUM SERPL-SCNC: 134 MMOL/L (ref 136–145)

## 2025-01-12 PROCEDURE — 25010000002 FUROSEMIDE PER 20 MG: Performed by: INTERNAL MEDICINE

## 2025-01-12 PROCEDURE — 82948 REAGENT STRIP/BLOOD GLUCOSE: CPT

## 2025-01-12 PROCEDURE — 80048 BASIC METABOLIC PNL TOTAL CA: CPT | Performed by: HOSPITALIST

## 2025-01-12 PROCEDURE — 25010000002 CEFTRIAXONE PER 250 MG: Performed by: NURSE PRACTITIONER

## 2025-01-12 PROCEDURE — 63710000001 INSULIN LISPRO (HUMAN) PER 5 UNITS: Performed by: STUDENT IN AN ORGANIZED HEALTH CARE EDUCATION/TRAINING PROGRAM

## 2025-01-12 PROCEDURE — 25010000002 HEPARIN (PORCINE) PER 1000 UNITS: Performed by: STUDENT IN AN ORGANIZED HEALTH CARE EDUCATION/TRAINING PROGRAM

## 2025-01-12 RX ORDER — FUROSEMIDE 10 MG/ML
40 INJECTION INTRAMUSCULAR; INTRAVENOUS ONCE
Status: COMPLETED | OUTPATIENT
Start: 2025-01-12 | End: 2025-01-12

## 2025-01-12 RX ORDER — HYDROCODONE BITARTRATE AND ACETAMINOPHEN 10; 325 MG/1; MG/1
1 TABLET ORAL EVERY 4 HOURS PRN
Qty: 20 TABLET | Refills: 0 | Status: SHIPPED | OUTPATIENT
Start: 2025-01-12

## 2025-01-12 RX ORDER — AMLODIPINE BESYLATE 5 MG/1
5 TABLET ORAL
Start: 2025-01-12 | End: 2025-01-13

## 2025-01-12 RX ORDER — DIPHENOXYLATE HYDROCHLORIDE AND ATROPINE SULFATE 2.5; .025 MG/1; MG/1
1 TABLET ORAL 4 TIMES DAILY PRN
Qty: 20 TABLET | Refills: 0 | Status: SHIPPED | OUTPATIENT
Start: 2025-01-12

## 2025-01-12 RX ORDER — FUROSEMIDE 20 MG/1
20 TABLET ORAL DAILY
Start: 2025-01-12 | End: 2025-01-13

## 2025-01-12 RX ORDER — HYDROCODONE BITARTRATE AND ACETAMINOPHEN 10; 325 MG/1; MG/1
1 TABLET ORAL EVERY 4 HOURS PRN
Qty: 20 TABLET | Refills: 0 | Status: CANCELLED | OUTPATIENT
Start: 2025-01-12

## 2025-01-12 RX ADMIN — SODIUM BICARBONATE 650 MG: 650 TABLET ORAL at 21:08

## 2025-01-12 RX ADMIN — AMLODIPINE BESYLATE 5 MG: 5 TABLET ORAL at 09:30

## 2025-01-12 RX ADMIN — Medication 10 ML: at 21:09

## 2025-01-12 RX ADMIN — FUROSEMIDE 40 MG: 10 INJECTION, SOLUTION INTRAMUSCULAR; INTRAVENOUS at 12:14

## 2025-01-12 RX ADMIN — HEPARIN SODIUM 5000 UNITS: 5000 INJECTION INTRAVENOUS; SUBCUTANEOUS at 21:09

## 2025-01-12 RX ADMIN — FUROSEMIDE 20 MG: 20 TABLET ORAL at 09:30

## 2025-01-12 RX ADMIN — HEPARIN SODIUM 5000 UNITS: 5000 INJECTION INTRAVENOUS; SUBCUTANEOUS at 05:36

## 2025-01-12 RX ADMIN — INSULIN LISPRO 2 UNITS: 100 INJECTION, SOLUTION INTRAVENOUS; SUBCUTANEOUS at 12:14

## 2025-01-12 RX ADMIN — Medication 10 ML: at 09:31

## 2025-01-12 RX ADMIN — Medication 10 ML: at 09:32

## 2025-01-12 RX ADMIN — Medication 10 ML: at 21:08

## 2025-01-12 RX ADMIN — POTASSIUM CHLORIDE 20 MEQ: 1500 TABLET, EXTENDED RELEASE ORAL at 09:30

## 2025-01-12 RX ADMIN — CEFTRIAXONE 2000 MG: 2 INJECTION, POWDER, FOR SOLUTION INTRAMUSCULAR; INTRAVENOUS at 09:28

## 2025-01-12 RX ADMIN — INSULIN LISPRO 2 UNITS: 100 INJECTION, SOLUTION INTRAVENOUS; SUBCUTANEOUS at 21:08

## 2025-01-12 RX ADMIN — SODIUM BICARBONATE 650 MG: 650 TABLET ORAL at 09:30

## 2025-01-12 RX ADMIN — HEPARIN SODIUM 5000 UNITS: 5000 INJECTION INTRAVENOUS; SUBCUTANEOUS at 14:29

## 2025-01-12 NOTE — PROGRESS NOTES
[facility] MEDICINE SERVICE  DAILY PROGRESS NOTE    NAME: Gladys Garrison  : 1960  MRN: [id number]      LOS: 6 days     PROVIDER OF SERVICE: Bautista Chapman MD    Chief Complaint: JOSE (acute kidney injury)    Subjective:     Interval History: Patient is doing well today, states that she does still have some lower extremity edema.  She was supposed to be discharged to SNF today however she was upset because she never got to choose which facility and has refused to go now.  She has been tolerating p.o. intake well.    Review of Systems:   Review of Systems    Objective:     Vital Signs  Temp:  [97.8 °F (36.6 °C)-98.9 °F (37.2 °C)] 97.8 °F (36.6 °C)  Heart Rate:  [61-67] 65  Resp:  [15-18] 16  BP: (130-142)/(61-78) 142/72   Body mass index is 19.92 kg/m².    Physical Exam  General Appearance:  Alert, cooperative, no distress, appears stated age  Head:  Normocephalic, without obvious abnormality, atraumatic  Eyes:  PERRL, conjunctiva/corneas clear, EOM's intact, fundi benign, both eyes  Ears:  Normal TM's and external ear canals, both ears  Nose: Nares normal, septum midline, mucosa normal, no drainage or sinus tenderness  Throat: Lips, mucosa, and tongue normal; teeth and gums normal  Neck: Supple, symmetrical, trachea midline, no adenopathy, thyroid: not enlarged, symmetric, no tenderness/mass/nodules, no carotid bruit or JVD  Lungs:   Clear to auscultation bilaterally, respirations unlabored  Heart:  Regular rate and rhythm, S1, S2 normal, no murmur, rub or gallop  Abdomen:  Soft, mild diffuse TTP, bowel sounds active all four quadrants,  no masses, no organomegaly  Extremities: 1+ BL LE pitting edema   Pulses: 2+ and symmetric  Skin: Skin color, texture, turgor normal, no rashes or lesions  Neurologic: Normal        Scheduled Meds   amLODIPine, 5 mg, Oral, Q24H  cefTRIAXone, 2,000 mg, Intravenous, Q24H  furosemide, 20 mg, Oral, Daily  heparin (porcine), 5,000 Units, Subcutaneous, Q8H  insulin lispro,  2-7 Units, Subcutaneous, 4x Daily AC & at Bedtime  potassium chloride, 20 mEq, Oral, Daily  potassium chloride, 40 mEq, Oral, Once  sodium bicarbonate, 650 mg, Oral, BID  sodium chloride, 10 mL, Intravenous, Q12H  sodium chloride, 10 mL, Intravenous, Q12H       PRN Meds     Calcium Replacement - Follow Nurse / BPA Driven Protocol    dextrose    dextrose    glucagon (human recombinant)    Magnesium Standard Dose Replacement - Follow Nurse / BPA Driven Protocol    nitroglycerin    ondansetron    Phosphorus Replacement - Follow Nurse / BPA Driven Protocol    Potassium Replacement - Follow Nurse / BPA Driven Protocol    sodium chloride    sodium chloride    sodium chloride    sodium chloride    sodium chloride   Infusions           Diagnostic Data    Results from last 7 days   Lab Units 01/12/25  0548 01/11/25  0523 01/10/25  0950 01/08/25  1514 01/08/25  0401   WBC 10*3/mm3  --   --  7.54  --  3.80   HEMOGLOBIN g/dL  --   --  9.3*  --  10.8*   HEMATOCRIT %  --   --  28.8*  --  33.7*   PLATELETS 10*3/mm3  --   --  53*  --  43*   GLUCOSE mg/dL 111*   < >  --    < > 113*   CREATININE mg/dL 0.51*   < >  --    < > 0.86   BUN mg/dL 11   < >  --    < > 39*   SODIUM mmol/L 134*   < >  --    < > 134*   POTASSIUM mmol/L 3.9   < >  --    < > 3.6   AST (SGOT) U/L  --   --   --   --  33*   ALT (SGPT) U/L  --   --   --   --  14   ALK PHOS U/L  --   --   --   --  52   BILIRUBIN mg/dL  --   --   --   --  0.5   ANION GAP mmol/L 6.9   < >  --    < > 7.3    < > = values in this interval not displayed.       No radiology results for the last day      I reviewed the patient's new clinical results.    Assessment/Plan:   Gladys Garrison is a 64 y.o. female with a PMH of breast cancer with metastasis to bone, complex cardiac medical history including tetralogy of Fallot, coarctation of aorta, VSD status post repair, coarctation of aorta S/P stent who presented to [facility] on 1/5/20[facility] with diarrhea since around last Tuesday it is  dark color, associated with nausea and multiple episodes of vomiting. States she has not been able to keep anything down, having generalized abdominal cramps, subjective fever and chills, generalized weakness. States she was treated for a UTI last month. States she has not urinated at all in past 3-4 days. Denies hematuria or dysuria, no flank pain. She sustained a fall 2 days ago and hit her head and face, denies LOC. Workup in ER chemistry labs show creat 2.05, K 3.4, Na 135, albumin 3.2, T bili 1.4. CBC labs Hb 12.6, wbc 11.4, platelets 52. ED course notable for hypotension that slightly improved with IVF, temp 100.1. Saturating well on room air. The decision to admit was made.     Hypotension due to volume depletion  Diarrheal illness  Oliguric JOSE due to hypovolemia on the basis of diarrhea and vomiting, NSAIDS use  Melena by history  -Patient was initiated on IV fluids with improvement in renal function back to baseline  -Patient states her diarrhea has improved although her p.o. intake is still somewhat poor  -Added nutritional supplement with meals  -Continue IV antibiotics with ceftriaxone for bacteremia but discontinued Flagyl   -Holding all nephrotoxic medications including losartan given JOSE  -Advanced to GI soft diet  -Restarted Lasix given that patient now has some peripheral edema    Group B strep bacteremia with ICD lead vegetation  -Unclear source although this could be related to UTI  -Urine culture with 50,000 CFU GBS  -Repeat blood cultures from 1/8 with no growth  -ID consult appreciated  -Continue ceftriaxone  -GREGORY confirmed vegetation on PPM/ICD lead  -Not a candidate for lead extraction given that patient is complete dependent on this device  -Plan for a 6-week course of IV antibiotics; PICC line placement    Possible UTI  -UA on admission suggestive of possible infection  -Continue ceftriaxone as above     History breast cancer with bone metastasis  Thrombocytopenia  -Was on Arimidex in the  past was discontinued due to osteoporosis  -Had intolerance to tamoxifen  -She is currently on Abemeciclib outpatient, but this is being held during treatment for her acute infectious process  -Appreciate oncology consult  -Monitor daily CBCs, monitor platelets  -Continue Norco  mg q 4 PRN for moderate pain  -Continue with intrathecal pain pump          VTE Prophylaxis:  Pharmacologic VTE prophylaxis orders are present.         Code status is   Code Status and Medical Interventions: CPR (Attempt to Resuscitate); Full Support   Ordered at: 01/07/25 1310     Code Status (Patient has no pulse and is not breathing):    CPR (Attempt to Resuscitate)     Medical Interventions (Patient has pulse or is breathing):    Full Support       Plan for disposition: SNF versus home on 1/13 with IV antibiotics    Time: 30 minutes    Part of this note may be an electronic transcription/translation of spoken language to printed text using the Dragon Dictation System.    Signature: Electronically signed by Bautista Chapman MD, 01/12/25, 13:01 EST.  [facility] Hospitalist Team

## 2025-01-12 NOTE — SIGNIFICANT NOTE
01/12/25 0833   Post Acute Pre-Cert Documentation   Request Submitted by Facility - Type: Hospital   Post-Acute Authorization Type Submitted: SNF   Verification from Payer Yes   Date Post Acute Pre-Cert Completed 01/12/25   All Clinicals Submitted? Yes   Had Accepting Facility at Time of Submission Yes   Response Received from Insurance? Approval   Response Communicated to:    Post Acute Pre-Cert Initiated Comment AWA RN checked availity- precert approved- 1/12-1/18/25 - cm made aware        89 y/o [place] speaking F from home lives with daughter. PMH of severe systolic CHF (s/p BiV AICD 2010 EF 25%), DM (diet controlled) and HTN who presented with progressively increased dyspnea on exertion, orthopnea, leg swelling who improved with lasix and dobutamine drip

## 2025-01-12 NOTE — PLAN OF CARE
Goal Outcome Evaluation:              Outcome Evaluation: Patient slept well during the night with  at bedside.  Currently needing 6 weeks of IV antibiotics.  Pt waiting on placement.  Struggled with potassium and refused last dose during the night.  Pt educated.  Stable at this time.

## 2025-01-12 NOTE — SIGNIFICANT NOTE
01/12/25 0833   Post Acute Pre-Cert Documentation   Request Submitted by Facility - Type: Hospital   Post-Acute Authorization Type Submitted: SNF   Verification from Payer Yes   Date Post Acute Pre-Cert Completed 01/12/25   All Clinicals Submitted? Yes   Had Accepting Facility at Time of Submission Yes   Response Received from Insurance? Approval   Response Communicated to:    Post Acute Pre-Cert Initiated Comment AWA RN checked availity- precert approved- 1/12-1/18/25 - cm made aware

## 2025-01-12 NOTE — PROGRESS NOTES
Name: Gladys Garrison  Age: 64 y.o.  : 1960  Sex: female    27    Subjective  Patient feels well.     Interval History   No Acute events overnight.      Objective:    Vital Signs  Temp:  [97.6 °F (36.4 °C)-98.9 °F (37.2 °C)] 98.3 °F (36.8 °C)  Heart Rate:  [60-67] 67  Resp:  [15-19] 18  BP: (130-141)/(61-79) 131/64        Intake/Output Summary (Last 24 hours) at 2025 1136  Last data filed at 20255  Gross per 24 hour   Intake 240 ml   Output --   Net 240 ml           Physical Exam  Physical Exam        Results Review:      Results from last 7 days   Lab Units 25  0548 25  0523 01/10/25  0606 25  1212 25  0401 25  0453 25  0553 25  0022   SODIUM mmol/L 134* 136 135* 137 134* 135* 136 135*   CO2 mmol/L 24.1 23.2 19.8* 21.4* 19.7* 16.9* 16.6* 19.5*   BUN mg/dL 11 15 18 23 39* 50* 52* 56*   CREATININE mg/dL 0.51* 0.60 0.61 0.74 0.86 1.09* 1.79* 2.05*   CALCIUM mg/dL 8.2* 8.4* 8.1* 8.5* 8.7 7.6* 7.7* 9.0   ALBUMIN g/dL  --   --   --   --  3.0* 2.8* 3.3* 3.2*   AST (SGOT) U/L  --   --   --   --  33* 36* 36* 46*   ALT (SGPT) U/L  --   --   --   --  14 17 18 24   EGFR mL/min/1.73 105.7 101.6 101.2 91.6 76.5 57.6* 31.7* 27.0*       Results from last 7 days   Lab Units 01/10/25  0950 25  0401 25  0453 25  0553 25  0022   WBC 10*3/mm3 7.54 3.80 4.64 4.89 11.40*       Imaging studies: I personally reviewed the patient's most recent pertinent imaging studies       Medication Review:   amLODIPine, 5 mg, Oral, Q24H  cefTRIAXone, 2,000 mg, Intravenous, Q24H  furosemide, 40 mg, Intravenous, Once  furosemide, 20 mg, Oral, Daily  heparin (porcine), 5,000 Units, Subcutaneous, Q8H  insulin lispro, 2-7 Units, Subcutaneous, 4x Daily AC & at Bedtime  potassium chloride, 20 mEq, Oral, Daily  potassium chloride, 40 mEq, Oral, Once  sodium bicarbonate, 650 mg, Oral, BID  sodium chloride, 10 mL, Intravenous, Q12H  sodium chloride, 10 mL, Intravenous,  Q12H          Assessment  JOSE  UTI  Proteinuria  Metabolic acidosis  Bacteremia  Edema      Plan:  Vital signs are stable.  Renal function stable BUN 11 creatinine 0.5 serum sodium 134 potassium 3.9    Continue daily oral Lasix.  Workup for proteinuria as outpatient.    Check BMP  in AM.      Ander Huggins MD  01/12/25  11:36 EST  Tel: [phone]  Fax: [phone]

## 2025-01-12 NOTE — CASE MANAGEMENT/SOCIAL WORK
Continued Stay Note   [facility]     Patient Name: Gladys Garrison  MRN: [id number]  Today's Date: 1/12/2025    Admit Date: 1/5/2025    Plan: [facility] ACCEPTED. PASRR approved. Pre-cert approved 1/12-1/18.   Discharge Plan       Row Name 01/12/25 0827       Plan    Plan [facility] ACCEPTED. PASRR approved. Pre-cert approved 1/12-1/18.                  Silvana Alcala RN     Office: [phone]  Fax: [phone]

## 2025-01-12 NOTE — PLAN OF CARE
Goal Outcome Evaluation:                 Patient will be going home with Son and Daughter n Law that lives in [place], [place].  will be setting up for home infusions and for PT to see patient. Patient needs to have PICC line placed before discharge tomorrow. Patient refused rehab.

## 2025-01-13 ENCOUNTER — READMISSION MANAGEMENT (OUTPATIENT)
Dept: CALL CENTER | Facility: HOSPITAL | Age: 65
End: 2025-01-13
Payer: MEDICARE

## 2025-01-13 ENCOUNTER — APPOINTMENT (OUTPATIENT)
Dept: GENERAL RADIOLOGY | Facility: HOSPITAL | Age: 65
DRG: 314 | End: 2025-01-13
Payer: MEDICARE

## 2025-01-13 VITALS
HEART RATE: 60 BPM | OXYGEN SATURATION: 91 % | SYSTOLIC BLOOD PRESSURE: 144 MMHG | WEIGHT: 102 LBS | TEMPERATURE: 97.7 F | HEIGHT: 60 IN | RESPIRATION RATE: 16 BRPM | DIASTOLIC BLOOD PRESSURE: 60 MMHG | BODY MASS INDEX: 20.03 KG/M2

## 2025-01-13 LAB
ANION GAP SERPL CALCULATED.3IONS-SCNC: 4.8 MMOL/L (ref 5–15)
BACTERIA SPEC AEROBE CULT: NORMAL
BUN SERPL-MCNC: 8 MG/DL (ref 8–23)
BUN/CREAT SERPL: 13.1 (ref 7–25)
CALCIUM SPEC-SCNC: 7.9 MG/DL (ref 8.6–10.5)
CHLORIDE SERPL-SCNC: 101 MMOL/L (ref 98–107)
CO2 SERPL-SCNC: 27.2 MMOL/L (ref 22–29)
CREAT SERPL-MCNC: 0.61 MG/DL (ref 0.57–1)
CREAT UR-MCNC: 35.3 MG/DL
EGFRCR SERPLBLD CKD-EPI 2021: 101.2 ML/MIN/1.73
GLUCOSE BLDC GLUCOMTR-MCNC: 124 MG/DL (ref 70–105)
GLUCOSE BLDC GLUCOMTR-MCNC: 162 MG/DL (ref 70–105)
GLUCOSE BLDC GLUCOMTR-MCNC: 163 MG/DL (ref 70–105)
GLUCOSE SERPL-MCNC: 130 MG/DL (ref 65–99)
POTASSIUM SERPL-SCNC: 3.7 MMOL/L (ref 3.5–5.2)
PROT ?TM UR-MCNC: 9.4 MG/DL
PROT/CREAT UR: 266.3 MG/G CREA (ref 0–200)
SODIUM SERPL-SCNC: 133 MMOL/L (ref 136–145)

## 2025-01-13 PROCEDURE — 82570 ASSAY OF URINE CREATININE: CPT | Performed by: HOSPITALIST

## 2025-01-13 PROCEDURE — 99232 SBSQ HOSP IP/OBS MODERATE 35: CPT | Performed by: INTERNAL MEDICINE

## 2025-01-13 PROCEDURE — 25010000002 CEFTRIAXONE PER 250 MG: Performed by: NURSE PRACTITIONER

## 2025-01-13 PROCEDURE — 25010000002 HEPARIN (PORCINE) PER 1000 UNITS: Performed by: STUDENT IN AN ORGANIZED HEALTH CARE EDUCATION/TRAINING PROGRAM

## 2025-01-13 PROCEDURE — 82948 REAGENT STRIP/BLOOD GLUCOSE: CPT | Performed by: STUDENT IN AN ORGANIZED HEALTH CARE EDUCATION/TRAINING PROGRAM

## 2025-01-13 PROCEDURE — C1751 CATH, INF, PER/CENT/MIDLINE: HCPCS

## 2025-01-13 PROCEDURE — 02HV33Z INSERTION OF INFUSION DEVICE INTO SUPERIOR VENA CAVA, PERCUTANEOUS APPROACH: ICD-10-PCS | Performed by: HOSPITALIST

## 2025-01-13 PROCEDURE — 82948 REAGENT STRIP/BLOOD GLUCOSE: CPT

## 2025-01-13 PROCEDURE — 71045 X-RAY EXAM CHEST 1 VIEW: CPT

## 2025-01-13 PROCEDURE — 80048 BASIC METABOLIC PNL TOTAL CA: CPT | Performed by: HOSPITALIST

## 2025-01-13 PROCEDURE — 84156 ASSAY OF PROTEIN URINE: CPT | Performed by: HOSPITALIST

## 2025-01-13 RX ORDER — SODIUM CHLORIDE 0.9 % (FLUSH) 0.9 %
10 SYRINGE (ML) INJECTION AS NEEDED
OUTPATIENT
Start: 2025-01-13

## 2025-01-13 RX ORDER — FUROSEMIDE 20 MG/1
20 TABLET ORAL DAILY
Qty: 30 TABLET | Refills: 0 | Status: SHIPPED | OUTPATIENT
Start: 2025-01-13 | End: 2025-01-13

## 2025-01-13 RX ORDER — SODIUM CHLORIDE 9 MG/ML
40 INJECTION, SOLUTION INTRAVENOUS AS NEEDED
OUTPATIENT
Start: 2025-01-13

## 2025-01-13 RX ORDER — AMLODIPINE BESYLATE 5 MG/1
5 TABLET ORAL
Qty: 30 TABLET | Refills: 0 | Status: SHIPPED | OUTPATIENT
Start: 2025-01-13 | End: 2025-02-12

## 2025-01-13 RX ORDER — SODIUM CHLORIDE 0.9 % (FLUSH) 0.9 %
10 SYRINGE (ML) INJECTION EVERY 12 HOURS SCHEDULED
OUTPATIENT
Start: 2025-01-13

## 2025-01-13 RX ORDER — AMLODIPINE BESYLATE 5 MG/1
5 TABLET ORAL
Qty: 30 TABLET | Refills: 0 | Status: SHIPPED | OUTPATIENT
Start: 2025-01-13 | End: 2025-01-13

## 2025-01-13 RX ORDER — FUROSEMIDE 20 MG/1
20 TABLET ORAL DAILY
Qty: 30 TABLET | Refills: 0 | Status: SHIPPED | OUTPATIENT
Start: 2025-01-13 | End: 2025-02-12

## 2025-01-13 RX ORDER — SODIUM CHLORIDE 0.9 % (FLUSH) 0.9 %
20 SYRINGE (ML) INJECTION AS NEEDED
OUTPATIENT
Start: 2025-01-13

## 2025-01-13 RX ADMIN — HEPARIN SODIUM 5000 UNITS: 5000 INJECTION INTRAVENOUS; SUBCUTANEOUS at 06:02

## 2025-01-13 RX ADMIN — FUROSEMIDE 20 MG: 20 TABLET ORAL at 10:01

## 2025-01-13 RX ADMIN — CEFTRIAXONE 2000 MG: 2 INJECTION, POWDER, FOR SOLUTION INTRAMUSCULAR; INTRAVENOUS at 10:01

## 2025-01-13 RX ADMIN — AMLODIPINE BESYLATE 5 MG: 5 TABLET ORAL at 10:01

## 2025-01-13 RX ADMIN — SODIUM BICARBONATE 650 MG: 650 TABLET ORAL at 10:01

## 2025-01-13 RX ADMIN — HEPARIN SODIUM 5000 UNITS: 5000 INJECTION INTRAVENOUS; SUBCUTANEOUS at 15:15

## 2025-01-13 RX ADMIN — POTASSIUM CHLORIDE 20 MEQ: 1500 TABLET, EXTENDED RELEASE ORAL at 10:01

## 2025-01-13 NOTE — PLAN OF CARE
Problem: Adult Inpatient Plan of Care  Goal: Absence of Hospital-Acquired Illness or Injury  Intervention: Identify and Manage Fall Risk  Recent Flowsheet Documentation  Taken 1/13/2025 0400 by Faith Hernández RN  Safety Promotion/Fall Prevention: safety round/check completed  Taken 1/13/2025 0200 by Faith Hernández, RN  Safety Promotion/Fall Prevention: safety round/check completed   Goal Outcome Evaluation:           Pt rested well overnight, no c/o pain. VSS. Pt educated on current plan of care, verbalized understanding.

## 2025-01-13 NOTE — PROGRESS NOTES
"                                                                                                            [facility]                                                                     Nephrology Progress Note                                                                                LOS: 7 days     Chief Complaint/ Reason for encounter: no    Subjective   01/13/25 : denies for any new complaint. No nausea or vomiting.      Medical history reviewed:  History of Present Illness    Subjective    History taken from: Patient and chart    Vital Signs  Temp:  [97.8 °F (36.6 °C)-99.1 °F (37.3 °C)] 98.5 °F (36.9 °C)  Heart Rate:  [] 60  Resp:  [16] 16  BP: (128-142)/(51-72) 134/51       Wt Readings from Last 1 Encounters:   01/07/25 1153 46.3 kg (102 lb)   01/05/25 2351 46.3 kg (102 lb)       Objective:  Vital signs: (most recent): Blood pressure 134/51, pulse 60, temperature 98.5 °F (36.9 °C), temperature source Oral, resp. rate 16, height 152.4 cm (60\"), weight 46.3 kg (102 lb), SpO2 91%, not currently breastfeeding.                Objective:  General Appearance:  Comfortable, -appearing, in no acute distress and not in pain.  Awake, alert  HEENT: Mucous membranes moist, no injury, oropharynx clear  Lungs:  Normal effort and normal respiratory rate.  Breath sounds clear to auscultation.  No  respiratory distress.  No rales, decreased breath sounds or rhonchi.    Heart: Normal rate.  Regular rhythm.  S1, S2 normal.  No murmur.   Abdomen: Abdomen is soft.  Bowel sounds are normal, no abdominal tenderness.  There is no rebound or guarding  Extremities:  no edema of bilateral lower extremities  Skin:  Warm and dry with no rashes      Results Review:    Intake/Output:     Intake/Output Summary (Last 24 hours) at 1/13/2025 1059  Last data filed at 1/13/2025 0400  Gross per 24 hour   Intake 240 ml   Output --   Net 240 ml         DATA:  Radiology and Labs:  The following labs independently reviewed " by me. Additional labs ordered for tomorrow a.m.  Interval notes, chart personally reviewed by me.   Old records independently reviewed showing   The following radiologic studies independently viewed by me, findings   New problems include  Discussed with     Risk/ complexity of medical care/ medical decision making Moderate.    Labs:   Recent Results (from the past 24 hours)   POC Glucose Once    Collection Time: 01/12/25 11:45 AM    Specimen: Blood   Result Value Ref Range    Glucose 152 (H) 70 - 105 mg/dL   POC Glucose Once    Collection Time: 01/12/25  4:38 PM    Specimen: Blood   Result Value Ref Range    Glucose 113 (H) 70 - 105 mg/dL   POC Glucose Once    Collection Time: 01/12/25  8:36 PM    Specimen: Blood   Result Value Ref Range    Glucose 155 (H) 70 - 105 mg/dL   Basic Metabolic Panel    Collection Time: 01/13/25 12:44 AM    Specimen: Blood   Result Value Ref Range    Glucose 130 (H) 65 - 99 mg/dL    BUN 8 8 - 23 mg/dL    Creatinine 0.61 0.57 - 1.00 mg/dL    Sodium 133 (L) 136 - 145 mmol/L    Potassium 3.7 3.5 - 5.2 mmol/L    Chloride 101 98 - 107 mmol/L    CO2 27.2 22.0 - 29.0 mmol/L    Calcium 7.9 (L) 8.6 - 10.5 mg/dL    BUN/Creatinine Ratio 13.1 7.0 - 25.0    Anion Gap 4.8 (L) 5.0 - 15.0 mmol/L    eGFR 101.2 >60.0 mL/min/1.73   POC Glucose 4x Daily Before Meals & at Bedtime    Collection Time: 01/13/25  7:03 AM    Specimen: Blood   Result Value Ref Range    Glucose 124 (H) 70 - 105 mg/dL       Radiology:  Pertinent radiology studies were reviewed as described above      Medications have been reviewed separately in chart overview      ASSESSMENT / PLAN    Acute kidney injury resolved. secondary to intravascular volume depletion. UA revealed protein, ketones, wbc and very few RBCs.   Metabolic acidosis related to GI losses.      Plan  Repeat UA in next few days  UPCR 429 ... Repeat UPCR  Urine and bloodculture positive for Streptococci agalactiae  US renal no hydro.  Avoid NSAIDs, Avoid Nephrotoxic  medication.  Off iv fluids.   BMP reviewed for today.     Hypokalemia replace as needed.    Hypophosphatemia been replaced.     Nausea / vomiting / diarrhea      Bacteremia as per ID/ primary service.      Ca breast  on Abemeciclib as per oncology service.  Thrombocytopenia       Angelica Rodriguez MD  [facility]  Office phone number: [phone]  Answering service phone number: [phone]    01/13/25  10:59 EST    Dictation performed using Dragon dictation software

## 2025-01-13 NOTE — CONSULTS
PICC Line Insertion Procedure Note    Procedure: Insertion of #4 FR/18G PICC    Indications:  Home IV therapy    Active Time Out:  Correct patient: Yes  Correct procedure: Yes  Correct site: Yes  Verified with: primary rn    Procedure Details:  Informed consent was obtained for the procedure.  Risk include, but are not limited to infection, air embolism, catheter tip moving, catheter blockage and phlebitis.     Maximum sterile technique was used including antiseptics, cap, gloves, gown, hand hygiene, mask, and sheet.    Ultrasound Guidance: Yes    #4 FR/18G PICC inserted to the right brachial vein per hospital protocol by efrain miranda RN.   Non-pulsatile blood return: yes    Lot #: [id number]  Expiration date: 11/30/2025    Complications:  none    Findings:  Catheter inserted to 37 cm, with 1 cm exposed.   Mid upper arm circumference is 26 cm.   Catheter was flushed with 20 cc NS and sterile dressing applied.  Patient tolerated procedure well.  PICC tip verified by:       [] Sapiens 3cg       [x] Chest X-ray    Recommendations:  Verbal and/or written Care/Maintenance instructions provided to patient.   Primary nurse notified. CXR-PICC tip in SVC-notified primary nurse-Kristin Miranda RN  01/13/25  09:56 EST

## 2025-01-13 NOTE — PROGRESS NOTES
Nutrition Services  Patient Name: Gladys Garrison  YOB: 1960  MRN: [id number]  Admission date: 1/5/2025    PROGRESS NOTE      Nutrition Intervention Updates: Continue current po diet and ONS.    Encourage good po intake.        Encounter Information: Checking in to monitor po diet tolerance and intake. Possible discharge today. Tolerating po diet without noted issues at this time.        PO Diet: Diet: Diabetic; Consistent Carbohydrate; Fluid Consistency: Thin (IDDSI 0)   PO Supplements: Boost Original TID (Provides 720 kcals, 30 g protein if consumed)      PO Intake:  62% average po intake x last 6 documented meals        Current nutrition support: -   Nutrition support review: -       Labs (reviewed below): Hyponatremia - management per attending.        GI Function:  Last documented BM 1/10       Brief weight review   Wt Readings from Last 10 Encounters:   01/07/25 1153 46.3 kg (102 lb)   01/05/25 2351 46.3 kg (102 lb)   01/03/25 0936 54 kg (119 lb)   12/04/24 1516 54.4 kg (120 lb)   12/03/24 1444 53.5 kg (118 lb)   11/02/24 1407 53.5 kg (118 lb)   10/09/24 1507 54.4 kg (120 lb)   10/03/24 1606 54 kg (119 lb)   09/27/24 1210 55 kg (121 lb 3.2 oz)   09/19/24 1501 53.4 kg (117 lb 12.8 oz)   09/11/24 1559 55.3 kg (122 lb)        Results from last 7 days   Lab Units 01/13/25  0044 01/12/25  0548 01/11/25  1648 01/11/25  0523 01/08/25  1514 01/08/25  0401 01/07/25  0453   SODIUM mmol/L 133* 134*  --  136   < > 134* 135*   POTASSIUM mmol/L 3.7 3.9 3.6 3.5   < > 3.6 3.1*   CHLORIDE mmol/L 101 103  --  104   < > 107 108*   CO2 mmol/L 27.2 24.1  --  23.2   < > 19.7* 16.9*   BUN mg/dL 8 11  --  15   < > 39* 50*   CREATININE mg/dL 0.61 0.51*  --  0.60   < > 0.86 1.09*   CALCIUM mg/dL 7.9* 8.2*  --  8.4*   < > 8.7 7.6*   BILIRUBIN mg/dL  --   --   --   --   --  0.5 0.7   ALK PHOS U/L  --   --   --   --   --  52 39   ALT (SGPT) U/L  --   --   --   --   --  14 17   AST (SGOT) U/L  --   --   --   --   --  33* 36*    GLUCOSE mg/dL 130* 111*  --  102*   < > 113* 97    < > = values in this interval not displayed.     Results from last 7 days   Lab Units 01/10/25  0950 01/08/25  1514 01/08/25  0401 01/07/25  0453   MAGNESIUM mg/dL  --   --   --  2.1   PHOSPHORUS mg/dL  --  2.4*   < > 2.0*   HEMOGLOBIN g/dL 9.3*  --    < > 10.7*   HEMATOCRIT % 28.8*  --    < > 34.1    < > = values in this interval not displayed.         RD to follow up per protocol.    Electronically signed by:  Tabitha Hardy RD  01/13/25 13:53 EST

## 2025-01-13 NOTE — DISCHARGE SUMMARY
[facility] Medicine Services  Discharge Summary    Date of Service: 2025  Patient Name: Gladys Garrison  : 1960  MRN: [id number]    Date of Admission: 2025  Discharge Diagnosis: #Hypotension due to volume depletion-resolved  #Diarrheal illness-resolved. Suspect viral in etiology   #Oliguric JOSE due to hypovolemia-resolved  #Metabolic acidosis  #Group B strep bacteremia with ICD lead vegetation  #History breast cancer with bone metastasis  #Thrombocytopenia due to abemaciclib     Date of Discharge: 2025  Primary Care Physician: Chirag Robles DO      Presenting Problem:   Diarrhea of presumed infectious origin [R19.7]  Acute abdominal pain [R10.9]  JOSE (acute kidney injury) [N17.9]  Neck sprain, initial encounter [S13.9XXA]  Contusion of face, initial encounter [S00.83XA]  Injury of head, initial encounter [S09.90XA]  Sepsis, due to unspecified organism, unspecified whether acute organ dysfunction present [A41.9]    Active and Resolved Hospital Problems:  Active Hospital Problems    Diagnosis POA    **JOSE (acute kidney injury) [N17.9] Yes    Severe protein-calorie malnutrition [E43] Yes      Resolved Hospital Problems   No resolved problems to display.         Hospital Course     HPI:  Admitting team HPI   Gladys Garrison is a 64 y.o. female with a PMH of breast cancer with metastasis to bone, complex cardiac medical history including tetralogy of Fallot, coarctation of aorta, VSD status post repair, coarctation of aorta S/P stent who presented to [facility] on 2025 with diarrhea since around last Tuesday it is dark color, associated with nausea and multiple episodes of vomiting.      Hospital Course:    #Hypotension due to volume depletion-resolved  #Diarrheal illness-resolved. Suspect viral in etiology   #Oliguric JOSE due to hypovolemia-resolved  #Metabolic acidosis  -Patient was initiated on IV fluids with improvement in renal function back to  baseline  -Patient states her diarrhea has improved although her p.o. intake is still somewhat poor  -Added nutritional supplement with meals.  Diet gradually advanced tolerating well  -Antibiotics as below  -Currently on amlodipine 5 mg daily BP stable  -Lasix 20 mg daily continue     #Group B strep bacteremia with ICD lead vegetation  -Unclear source although this could be related to UTI  -Urine culture with 50,000 CFU GBS  -Repeat blood cultures from 1/8 with no growth  -ID consult appreciated  -Continue ceftriaxone 2 g daily plan for total 6 weeks of antibiotics.  End date 10/15/2025.  PICC line in place.  Weekly labs CBC and creatinine  -GREGORY confirmed vegetation on PPM/ICD lead.  Patient will need repeat GREGORY after 6 weeks of antibiotics  -Per cardiology patient is a poor candidate for lead extraction.  Plan is for 6 weeks of antibiotics may need to consider referral for laser lead extraction and leadless pacemaker       Possible UTI  -UA on admission suggestive of possible infection  -Suspect ascending infection in setting of bacteremia.  Ceftriaxone as above     #History breast cancer with bone metastasis  #Thrombocytopenia due to abemaciclib   Seen by oncology appreciate recommendations  Plan to hold off on abemaciclib until clincall improvement   Out pt follow up with oncology   Continue with intrathecal pain pump    #DM2  Continue home regimen         DISCHARGE Follow Up Recommendations for labs and diagnostics:   Finish total 6 weeks of antibiotics per infectious disease.  PICC line to be removed after course finishes  Weekly labs CBC and creatinine  Outpatient follow-up with cardiology  Patient would benefit from repeat TTE after course of antibiotics  Outpatient follow-up with oncology    Patient acute rehab placement versus to go home        Day of Discharge     Vital Signs:  Temp:  [97.7 °F (36.5 °C)-98.5 °F (36.9 °C)] 97.7 °F (36.5 °C)  Heart Rate:  [] 60  Resp:  [16] 16  BP: (126-144)/(51-63)  144/60    Physical Exam:  Physical Exam   AOx3 NAD  RRR S1 and S2 audible  Lungs were clear currently  Abdomen soft nontender nondistended      Pertinent  and/or Most Recent Results     LAB RESULTS:      Lab 01/10/25  0950 01/08/25  0401 01/07/25  0453   WBC 7.54 3.80 4.64   HEMOGLOBIN 9.3* 10.8* 10.7*   HEMATOCRIT 28.8* 33.7* 34.1   PLATELETS 53* 43* 36*   NEUTROS ABS 6.51 2.66 3.57   IMMATURE GRANS (ABS) 0.16* 0.11*  --    LYMPHS ABS 0.28* 0.40*  --    MONOS ABS 0.56 0.61  --    EOS ABS 0.02 0.01 0.05   MCV 92.6 92.8 95.3         Lab 01/13/25  0044 01/12/25  0548 01/11/25  1648 01/11/25  0523 01/10/25  0606 01/09/25  1212 01/08/25  1514 01/08/25  0401 01/07/25  0453   SODIUM 133* 134*  --  136 135* 137  --  134* 135*   POTASSIUM 3.7 3.9 3.6 3.5 4.1 4.0 4.0 3.6 3.1*   CHLORIDE 101 103  --  104 107 107  --  107 108*   CO2 27.2 24.1  --  23.2 19.8* 21.4*  --  19.7* 16.9*   ANION GAP 4.8* 6.9  --  8.8 8.2 8.6  --  7.3 10.1   BUN 8 11  --  15 18 23  --  39* 50*   CREATININE 0.61 0.51*  --  0.60 0.61 0.74  --  0.86 1.09*   EGFR 101.2 105.7  --  101.6 101.2 91.6  --  76.5 57.6*   GLUCOSE 130* 111*  --  102* 118* 107*  --  113* 97   CALCIUM 7.9* 8.2*  --  8.4* 8.1* 8.5*  --  8.7 7.6*   MAGNESIUM  --   --   --   --   --   --   --   --  2.1   PHOSPHORUS  --   --   --   --   --   --  2.4* 1.7* 2.0*         Lab 01/08/25  0401 01/07/25  0453   TOTAL PROTEIN 5.7* 5.4*   ALBUMIN 3.0* 2.8*   GLOBULIN 2.7 2.6   ALT (SGPT) 14 17   AST (SGOT) 33* 36*   BILIRUBIN 0.5 0.7   ALK PHOS 52 39                     Lab 01/07/25  1453   PH, ARTERIAL 7.370   PCO2, ARTERIAL 30.8*   PO2 ART 95.8   O2 SATURATION ART 97.4   FIO2 28   HCO3 ART 17.8*   BASE EXCESS ART -6.5*     Brief Urine Lab Results  (Last result in the past 365 days)        Color   Clarity   Blood   Leuk Est   Nitrite   Protein   CREAT   Urine HCG        01/07/25 1513             78.3               Microbiology Results (last 10 days)       Procedure Component Value - Date/Time     Blood Culture - Blood, Hand, Right [[id number]]  (Normal) Collected: 01/08/25 1112    Lab Status: Final result Specimen: Blood from Hand, Right Updated: 01/13/25 1130     Blood Culture No growth at 5 days    Urine Culture - Urine, Straight Cath [[id number]]  (Abnormal) Collected: 01/06/25 0035    Lab Status: Final result Specimen: Urine from Straight Cath Updated: 01/07/25 1035     Urine Culture 50,000 CFU/mL Streptococcus agalactiae (Group B)     Comment:   This organism is considered to be universally susceptible to penicillin.  No further antibiotic testing will be performed.       Narrative:      Colonization of the urinary tract without infection is common. Treatment is discouraged unless the patient is symptomatic, pregnant, or undergoing an invasive urologic procedure.    Blood Culture - Blood, Arm, Right [[id number]]  (Abnormal) Collected: 01/06/25 0033    Lab Status: Final result Specimen: Blood from Arm, Right Updated: 01/08/25 0707     Blood Culture Streptococcus agalactiae (Group B)     Isolated from Aerobic and Anaerobic Bottles     Gram Stain Anaerobic Bottle Gram positive cocci in pairs and chains      Aerobic Bottle Gram positive cocci in pairs and chains    Narrative:      Refer to previous blood culture collected on 01/06/2025 at 0032 for MICs.        Less than seven (7) mL's of blood was collected.  Insufficient quantity may yield false negative results.    Blood Culture - Blood, Arm, Left [[id number]]  (Abnormal)  (Susceptibility) Collected: 01/06/25 0032    Lab Status: Final result Specimen: Blood from Arm, Left Updated: 01/08/25 0706     Blood Culture Streptococcus agalactiae (Group B)     Isolated from Aerobic and Anaerobic Bottles     Gram Stain Anaerobic Bottle Gram positive cocci in pairs and chains      Aerobic Bottle Gram positive cocci in pairs and chains    Susceptibility        Streptococcus agalactiae (Group B)      MICK      Ceftriaxone Susceptible      Penicillin G Susceptible       Vancomycin Susceptible                           Blood Culture ID, PCR - Blood, Arm, Left [[id number]]  (Abnormal) Collected: 01/06/25 0032    Lab Status: Final result Specimen: Blood from Arm, Left Updated: 01/06/25 1200     BCID, PCR Streptococcus agalactiae (Group B). Identification by BCID2 PCR.     BOTTLE TYPE Anaerobic Bottle    Respiratory Panel PCR w/COVID-19(SARS-CoV-2) [facility] In-House, NP Swab in UTM/VTM, 2 HR TAT - Swab, Nasopharynx [[id number]]  (Normal) Collected: 01/06/25 0024    Lab Status: Final result Specimen: Swab from Nasopharynx Updated: 01/06/25 0130     ADENOVIRUS, PCR Not Detected     Coronavirus 229E Not Detected     Coronavirus HKU1 Not Detected     Coronavirus NL63 Not Detected     Coronavirus OC43 Not Detected     COVID19 Not Detected     Human Metapneumovirus Not Detected     Human Rhinovirus/Enterovirus Not Detected     Influenza A PCR Not Detected     Influenza B PCR Not Detected     Parainfluenza Virus 1 Not Detected     Parainfluenza Virus 2 Not Detected     Parainfluenza Virus 3 Not Detected     Parainfluenza Virus 4 Not Detected     RSV, PCR Not Detected     Bordetella pertussis pcr Not Detected     Bordetella parapertussis PCR Not Detected     Chlamydophila pneumoniae PCR Not Detected     Mycoplasma pneumo by PCR Not Detected    Narrative:      In the setting of a positive respiratory panel with a viral infection PLUS a negative procalcitonin without other underlying concern for bacterial infection, consider observing off antibiotics or discontinuation of antibiotics and continue supportive care. If the respiratory panel is positive for atypical bacterial infection (Bordetella pertussis, Chlamydophila pneumoniae, or Mycoplasma pneumoniae), consider antibiotic de-escalation to target atypical bacterial infection.            XR Chest 1 View    Result Date: 1/13/2025  Impression: Impression: 1.Right approach central venous catheter distal tip overlies the superior vena  cava. No pneumothorax. 2.Moderate pulmonary edema with trace bilateral pleural effusions. Electronically Signed: Esteban Yeung MD  1/13/2025 10:35 AM EST  Workstation ID: [id number]    US Renal Bilateral    Result Date: 1/6/2025  Impression: Impression: Normal ultrasound appearance of the kidneys with no evidence of obstructive uropathy Electronically Signed: Roque Isamar  1/6/2025 9:33 AM EST  Workstation ID: [id number]    CT Cervical Spine Without Contrast    Result Date: 1/6/2025  Impression: No acute cervical spine fracture. No significant interval change. Electronically Signed: Brad Foster MD  1/6/2025 2:04 AM EST  Workstation ID: [id number]    CT Facial Bones Without Contrast    Result Date: 1/6/2025  Impression: Negative facial bone CT. Electronically Signed: Brad Foster MD  1/6/2025 1:58 AM EST  Workstation ID: [id number]    CT Abdomen Pelvis Without Contrast    Result Date: 1/6/2025  Impression: Impression: 1.No acute abdominal or pelvic abnormality. 2.Mild splenomegaly. 3.Nonobstructing left renal stone. 4.Layering density in the gallbladder may be stones or sludge. Electronically Signed: Rocky Heredia MD  1/6/2025 1:56 AM EST  Workstation ID: [id number]    CT Head Without Contrast    Result Date: 1/6/2025  Impression: Senescent changes without acute abnormality. Electronically Signed: Brad Foster MD  1/6/2025 1:54 AM EST  Workstation ID: [id number]    XR Chest 1 View    Result Date: 1/6/2025  Impression: 1.Cardiomegaly with postoperative changes of CABG and valve repair. 2.No acute infiltrates identified. Electronically Signed: Brad Foster MD  1/6/2025 12:38 AM EST  Workstation ID: [id number]     Results for orders placed during the hospital encounter of 08/04/23    Duplex Venous Lower Extremity - Bilateral CAR    Interpretation Summary    Normal bilateral lower extremity venous duplex scan.      Results for orders placed during the hospital encounter of 08/04/23    Duplex Venous Lower Extremity -  Bilateral CAR    Interpretation Summary    Normal bilateral lower extremity venous duplex scan.      Results for orders placed during the hospital encounter of 01/05/25    Adult Transthoracic Echo Complete W/ Cont if Necessary Per Protocol    Interpretation Summary    Left ventricular systolic function is normal. Left ventricular ejection fraction appears to be 56 - 60%.    Left ventricular diastolic function is consistent with (grade II w/high LAP) pseudonormalization.    Moderately reduced right ventricular systolic function noted.    The right ventricular cavity is severely dilated.    The left atrial cavity is moderate to severely dilated.    Left atrial volume is moderately increased.    The right atrial cavity is mildly  dilated.    Moderate tricuspid valve regurgitation is present.    Estimated right ventricular systolic pressure from tricuspid regurgitation is moderately elevated (45-55 mmHg).    Moderate to severe pulmonary hypertension is present.    There is a bioprosthetic pulmonic valve present.      Labs Pending at Discharge:  Pending Results       Procedure [Order ID] Specimen - Date/Time    Adult Transesophageal Echo (GREGORY) W/ Cont if Necessary Per Protocol [[id number]] Resulted: 01/09/25 1446     Updated: 01/09/25 1446      CV ECHO SHUNT ASSESSMENT PERFORMED (HIDDEN SCRIPTING) 1     TR max eric 289.0 cm/sec      TR max PG 33.4 mmHg             Procedures Performed           Consults:   Consults       Date and Time Order Name Status Description    1/7/2025  3:18 PM Inpatient Cardiology Consult Completed     1/7/2025  9:19 AM Hematology & Oncology Inpatient Consult Completed     1/7/2025  3:58 AM Inpatient Nephrology Consult Completed     1/6/2025  2:22 PM Inpatient Infectious Diseases Consult Completed     1/6/2025  5:13 AM Inpatient Gastroenterology Consult Completed     1/6/2025  3:58 AM Inpatient Hospitalist Consult                Discharge Details        Discharge Medications        New  "Medications        Instructions Start Date   amLODIPine 5 MG tablet  Commonly known as: NORVASC   5 mg, Oral, Every 24 Hours Scheduled      cefTRIAXone 2,000 mg in sodium chloride 0.9 % 100 mL IVPB   2,000 mg, Intravenous, Every 24 Hours      furosemide 20 MG tablet  Commonly known as: LASIX   20 mg, Oral, Daily             Changes to Medications        Instructions Start Date   diphenoxylate-atropine 2.5-0.025 MG per tablet  Commonly known as: LOMOTIL  What changed:   when to take this  reasons to take this   1 tablet, Oral, 4 Times Daily PRN             Continue These Medications        Instructions Start Date   Accu-Chek Araceli device   Use as instructed   To test blood sugar bid      B-D UF III MINI PEN NEEDLES 31G X 5 MM misc  Generic drug: Insulin Pen Needle   Use to inject insulin twice daily   DX: E11.9      Calcium 600+D Plus Minerals 600-400 MG-UNIT chewable tablet   600 mg, Oral, 2 Times Daily      cyclobenzaprine 10 MG tablet  Commonly known as: FLEXERIL   10 mg, Oral, 2 Times Daily PRN      exemestane 25 MG tablet  Commonly known as: AROMASIN   25 mg, Daily      famotidine 20 MG tablet  Commonly known as: PEPCID   20 mg, 2 Times Daily PRN      FreeStyle Rajeev 14 Day Ringle device   1 each, Not Applicable, Daily      FreeStyle Rajeev 14 Day Sensor misc   1 each, Not Applicable, Daily      HYDROcodone-acetaminophen  MG per tablet  Commonly known as: NORCO   1 tablet, Oral, Every 4 Hours PRN      insulin NPH-insulin regular (70-30) 100 UNIT/ML injection  Commonly known as: humuLIN 70/30,novoLIN 70/30   5 Units, Subcutaneous, Every Evening, If BS over 180      Insulin Syringe-Needle U-100 28G X 1/2\" 1 ML misc   1 each, Not Applicable, 2 Times Daily      Morphine Sulfate (PF) 8 MG/ML solution   8.5 mg, Daily      ondansetron ODT 4 MG disintegrating tablet  Commonly known as: ZOFRAN-ODT   8 mg, Translingual, Every 8 Hours PRN      rosuvastatin 20 MG tablet  Commonly known as: Crestor   20 mg, Oral, " Nightly             Stop These Medications      abemaciclib 100 mg tablet chemo tablet  Commonly known as: VERZENIO     fluticasone 50 MCG/ACT nasal spray  Commonly known as: FLONASE     ibuprofen 800 MG tablet  Commonly known as: ADVIL,MOTRIN     losartan 50 MG tablet  Commonly known as: Cozaar     Naloxegol Oxalate 12.5 MG tablet  Commonly known as: Movantik              Allergies   Allergen Reactions    Oxycodone Nausea And Vomiting    Promethazine Other (See Comments)     Hyperactive mean    Tape Other (See Comments)     .blisters      Adhesive Tape Rash    Flexeril [Cyclobenzaprine] Rash         Discharge Disposition:   Home or Self Care    Diet:  Hospital:  Diet Order   Procedures    Diet: Diabetic; Consistent Carbohydrate; Fluid Consistency: Thin (IDDSI 0)         Discharge Activity:         CODE STATUS:  Code Status and Medical Interventions: CPR (Attempt to Resuscitate); Full Support   Ordered at: 01/07/25 1310     Code Status (Patient has no pulse and is not breathing):    CPR (Attempt to Resuscitate)     Medical Interventions (Patient has pulse or is breathing):    Full Support         Future Appointments   Date Time Provider Department Center   1/14/2025  2:00 PM Chuck Hunter MD [facility]   1/20/2025 11:20 AM Geovani Pimentel MD [facility] PM [facility]   1/22/2025  3:10 PM LAB MD [facility]   1/22/2025  3:15 PM Мария Nunez MD [facility]       Additional Instructions for the Follow-ups that You Need to Schedule       Discharge Follow-up with Specified Provider: Follow up with cardiology in 6 weeks; 6 Weeks   As directed      To: Follow up with cardiology in 6 weeks   Follow Up: 6 Weeks                Time spent on Discharge including face to face service:  55 minutes    Signature: Electronically signed by Ranjit Foss MD, 01/13/25, 17:22 [facility] [[email or web address]].  [facility] [place] Hospitalist Team

## 2025-01-13 NOTE — PROGRESS NOTES
Hematology/Oncology Inpatient Progress Note    PATIENT NAME: Gladys Garrison  : 1960  MRN: [id number]    CHIEF COMPLAINT: Nausea, vomiting, diarrhea    HISTORY OF PRESENT ILLNESS:      Gladys Garrison is a 64 y.o. female with past medical history significant for tetralogy of Fallot, coarctation of aorta, VSD status post repair, coarctation of aorta S/P stent who presented to [facility] on 2025 with complaints of generalized weakness.  He reports she has been having diarrhea for several days that is dark in color associated with nausea and multiple episodes of vomiting.  She has generalized abdominal cramps, subjective fever and chills and generalized weakness.  She also reports a fall, no loss of consciousness.  CT imaging of the head and facial bones with no acute findings or significant interval changes.  She was treated for a UTI about 1 month ago.  She also reports she has not urinated in several days.  Workup in ED significant for JOSE.  She was noted to have hypotension that slightly improved with IV fluids, temperature of 100.1 °F.  She was admitted for further evaluation and workup.    2025 CT abdomen pelvis without contrast showed no acute abdominal or pelvic abnormality.  Mild splenomegaly.  Nonobstructing left renal stone.  Layering density in the gallbladder may be stones versus sludge.  Renal ultrasound was completed that was normal with no evidence of obstructive uropathy.    Gastroenterology was consulted and recommending stool studies for C. difficile and gastrointestinal panel.    Urine cultures completed.  Respiratory panel negative.  Preliminary blood cultures with gram-positive cocci in pairs and chains.  Infectious disease has been consulted.    25  Hematology/Oncology was consulted.  She is established with Dr. Nunez for history of metastatic breast cancer currently on exemestane/abemaciclib.     Patient has developed nausea vomiting along with diarrhea.   She denies fevers or chills.  She has been on abemaciclib.  She has not had any bleeding issues.  She still feels weak and ill      Subjective     Denies any new issues overnight      ROS:    Review of Systems   Constitutional:  Positive for fatigue. Negative for chills and fever.   HENT:  Negative for congestion, drooling, ear discharge, rhinorrhea, sinus pressure and tinnitus.    Eyes:  Negative for photophobia, pain and discharge.   Respiratory:  Negative for apnea, choking and stridor.    Cardiovascular:  Negative for palpitations.   Gastrointestinal:  Negative for abdominal distention, abdominal pain and anal bleeding.   Endocrine: Negative for polydipsia and polyphagia.   Genitourinary:  Negative for decreased urine volume, flank pain and genital sores.   Musculoskeletal:  Negative for gait problem, neck pain and neck stiffness.   Skin:  Negative for color change, rash and wound.   Neurological:  Positive for weakness. Negative for tremors, seizures, syncope, facial asymmetry and speech difficulty.   Hematological:  Negative for adenopathy.   Psychiatric/Behavioral:  Negative for agitation, confusion, hallucinations and self-injury. The patient is not hyperactive.         MEDICATIONS:        Scheduled Meds:  amLODIPine, 5 mg, Oral, Q24H  cefTRIAXone, 2,000 mg, Intravenous, Q24H  furosemide, 20 mg, Oral, Daily  heparin (porcine), 5,000 Units, Subcutaneous, Q8H  insulin lispro, 2-7 Units, Subcutaneous, 4x Daily AC & at Bedtime  potassium chloride, 20 mEq, Oral, Daily  potassium chloride, 40 mEq, Oral, Once  sodium bicarbonate, 650 mg, Oral, BID  sodium chloride, 10 mL, Intravenous, Q12H  sodium chloride, 10 mL, Intravenous, Q12H       Continuous Infusions:      PRN Meds:    Calcium Replacement - Follow Nurse / BPA Driven Protocol    dextrose    dextrose    glucagon (human recombinant)    Magnesium Standard Dose Replacement - Follow Nurse / BPA Driven Protocol    nitroglycerin    ondansetron    Phosphorus  "Replacement - Follow Nurse / BPA Driven Protocol    Potassium Replacement - Follow Nurse / BPA Driven Protocol    sodium chloride    sodium chloride    sodium chloride    sodium chloride    sodium chloride     ALLERGIES:    Allergies   Allergen Reactions    Oxycodone Nausea And Vomiting    Promethazine Other (See Comments)     Hyperactive mean    Tape Other (See Comments)     .blisters      Adhesive Tape Rash    Flexeril [Cyclobenzaprine] Rash       Objective    VITALS:   /60 (BP Location: Right arm, Patient Position: Lying)   Pulse 60   Temp 97.7 °F (36.5 °C)   Resp 16   Ht 152.4 cm (60\")   Wt 46.3 kg (102 lb)   LMP  (LMP Unknown)   SpO2 91%   BMI 19.92 kg/m²     PHYSICAL EXAM: (performed by MD)  Physical Exam  Vitals and nursing note reviewed.   Constitutional:       General: She is not in acute distress.     Appearance: She is not diaphoretic.   HENT:      Head: Normocephalic and atraumatic.   Eyes:      General: No scleral icterus.        Right eye: No discharge.         Left eye: No discharge.      Conjunctiva/sclera: Conjunctivae normal.   Neck:      Thyroid: No thyromegaly.   Cardiovascular:      Rate and Rhythm: Normal rate and regular rhythm.      Heart sounds: Normal heart sounds.      No friction rub. No gallop.   Pulmonary:      Effort: Pulmonary effort is normal. No respiratory distress.      Breath sounds: No stridor. No wheezing.   Abdominal:      General: Bowel sounds are normal.      Palpations: Abdomen is soft. There is no mass.      Tenderness: There is no abdominal tenderness. There is no guarding or rebound.   Musculoskeletal:         General: No tenderness. Normal range of motion.      Cervical back: Normal range of motion and neck supple.   Lymphadenopathy:      Cervical: No cervical adenopathy.   Skin:     General: Skin is warm.      Findings: No erythema or rash.   Neurological:      Mental Status: She is alert and oriented to person, place, and time.      Motor: No abnormal " muscle tone.   Psychiatric:         Behavior: Behavior normal.       Electronically signed by Мария Nunez MD, 01/13/25, 5:48 PM EST.     RECENT LABS:    Lab Results (last 24 hours)       Procedure Component Value Units Date/Time    Protein / Creatinine Ratio, Urine - Urine, Clean Catch [[id number]]  (Abnormal) Collected: 01/13/25 1231    Specimen: Urine, Clean Catch Updated: 01/13/25 1722     Protein/Creatinine Ratio, Urine 266.3 mg/G Crea      Creatinine, Urine 35.3 mg/dL      Total Protein, Urine 9.4 mg/dL     POC Glucose Once [[id number]]  (Abnormal) Collected: 01/13/25 1647    Specimen: Blood Updated: 01/13/25 1649     Glucose 163 mg/dL      Comment: Serial Number: [id number]:  [id number]       POC Glucose 4x Daily Before Meals & at Bedtime [[id number]]  (Abnormal) Collected: 01/13/25 1134    Specimen: Blood Updated: 01/13/25 1135     Glucose 162 mg/dL      Comment: Serial Number: [id number]:  [id number]       Blood Culture - Blood, Hand, Right [[id number]]  (Normal) Collected: 01/08/25 1112    Specimen: Blood from Hand, Right Updated: 01/13/25 1130     Blood Culture No growth at 5 days    POC Glucose 4x Daily Before Meals & at Bedtime [[id number]]  (Abnormal) Collected: 01/13/25 0703    Specimen: Blood Updated: 01/13/25 0704     Glucose 124 mg/dL      Comment: Serial Number: [id number]:  [id number]       Basic Metabolic Panel [[id number]]  (Abnormal) Collected: 01/13/25 0044    Specimen: Blood Updated: 01/13/25 0130     Glucose 130 mg/dL      BUN 8 mg/dL      Creatinine 0.61 mg/dL      Sodium 133 mmol/L      Potassium 3.7 mmol/L      Chloride 101 mmol/L      CO2 27.2 mmol/L      Calcium 7.9 mg/dL      BUN/Creatinine Ratio 13.1     Anion Gap 4.8 mmol/L      eGFR 101.2 mL/min/1.73     Narrative:      GFR Categories in Chronic Kidney Disease (CKD)      GFR Category          GFR (mL/min/1.73)    Interpretation  G1                     90 or greater         Normal or high (1)  G2                       60-89                Mild decrease (1)  G3a                   45-59                Mild to moderate decrease  G3b                   30-44                Moderate to severe decrease  G4                    15-29                Severe decrease  G5                    14 or less           Kidney failure          (1)In the absence of evidence of kidney disease, neither GFR category G1 or G2 fulfill the criteria for CKD.    eGFR calculation 2021 CKD-EPI creatinine equation, which does not include race as a factor    POC Glucose Once [[id number]]  (Abnormal) Collected: 01/12/25 2036    Specimen: Blood Updated: 01/12/25 2038     Glucose 155 mg/dL      Comment: Serial Number: [id number]:  [id number]               PENDING RESULTS:     IMAGING REVIEWED:  XR Chest 1 View    Result Date: 1/13/2025  Impression: 1.Right approach central venous catheter distal tip overlies the superior vena cava. No pneumothorax. 2.Moderate pulmonary edema with trace bilateral pleural effusions. Electronically Signed: Esteban Yeung MD  1/13/2025 10:35 AM EST  Workstation ID: [id number]     Assessment & Plan     ASSESSMENT:    :Metastatic breast cancer presenting as 1.1 cm mixed lytic/sclerotic hypermetabolic lesion in the left hemisacrum suspicious for metastatic disease 1.2 cm sclerotic lesion on L4, small L3 lesion and T11 lesion.  Tumor is ER positive, NH positive and HER-2/bishop negative.  Currently on treatment with abemaciclib (100 mg daily - recent dose reduction due to thrombocytopenia and recurrent UTIs) and exemestane 25 mg daily.  Will continue to hold Abemaciclib.  Reviewed with patient  Thrombocytopenia secondary to abemaciclib, bacteremia: Platelets 52,000 upon admission.  Today down to 36,000.  Acute drop may be secondary to acute infection.  Continue to monitor closely..  Her platelets are up to 53,000.  Check CBC  Hypotension due to volume depletion, JOSE, diarrhea: nephrology following. Supportive care and infectious workup  per primary team.  Group B streptococcal bacteremia with possible UTI: Empirically started on ceftriaxone     PLAN  Continue supportive care per primary team and infectious disease  Continue IV antibiotics for 6 weeks per ID recommendation  GREGORY reviewed. Thrombus versus vegetation on pacemaker leads Repeat GREGORY in 6 weeks post antibiotics per ID and cardiology recs   Will hold abemaciclib until clinical improvement.   Patient to go to rehab in [place] where she be close to family  Will continue to follow            Electronically signed by Мария Nunez MD, 01/13/25, 5:48 PM EST.

## 2025-01-13 NOTE — PROGRESS NOTES
Cardiology [place]        LOS:  LOS: 7 days   Patient Name: Gladys Garrison  Age/Sex: 64 y.o. female  : 1960  MRN: [id number]    Day of Service: 25   Length of Stay: 7  Encounter Provider: CIRO Fuentes  Place of Service: [facility] CARDIOLOGY  Patient Care Team:  Chirag Robles DO as PCP - General (Family Medicine)  Мария Nunez MD as Consulting Physician (Hematology and Oncology)  Steven Hammond MD as Consulting Physician (Cardiology)    Subjective:   Scribed findings below agree with narrative as discussed with nurse practitioner after face-to-face encounter with greater than 50% of total encounter time in medical decision making performed by me along with face-to-face encounter with the patient and  today    Chief Complaint: f/u + BC    Subjective: no acute events, GREGORY with thrombus vs. Vegetation on PPM lead, some edema, adding lasix, no chest pain    Plan ultimately for continuation of antibiotics  Poor candidate for lead extraction    Follow blood cultures and if recurrent would recommend referral for laser lead extraction however she is pacemaker dependent and would require additional lead placement versus Micra leadless pacemaker placement in the interim, decisions will need to be made at that time depending on outcome     Current Medications:   Scheduled Meds:amLODIPine, 5 mg, Oral, Q24H  cefTRIAXone, 2,000 mg, Intravenous, Q24H  furosemide, 20 mg, Oral, Daily  heparin (porcine), 5,000 Units, Subcutaneous, Q8H  insulin lispro, 2-7 Units, Subcutaneous, 4x Daily AC & at Bedtime  potassium chloride, 20 mEq, Oral, Daily  potassium chloride, 40 mEq, Oral, Once  sodium bicarbonate, 650 mg, Oral, BID  sodium chloride, 10 mL, Intravenous, Q12H  sodium chloride, 10 mL, Intravenous, Q12H      Continuous Infusions:     Allergies:  Allergies   Allergen Reactions    Oxycodone Nausea And Vomiting    Promethazine Other (See Comments)      Hyperactive mean    Tape Other (See Comments)     .blisters      Adhesive Tape Rash    Flexeril [Cyclobenzaprine] Rash       Review of Systems   Constitutional: Negative for chills, diaphoresis and malaise/fatigue.   Cardiovascular:  Positive for leg swelling. Negative for chest pain, dyspnea on exertion, irregular heartbeat, near-syncope, orthopnea, palpitations, paroxysmal nocturnal dyspnea and syncope.   Respiratory:  Negative for cough, shortness of breath, sleep disturbances due to breathing and sputum production.    Gastrointestinal:  Negative for change in bowel habit.   Genitourinary:  Negative for urgency.   Neurological:  Negative for dizziness and headaches.   Psychiatric/Behavioral:  Negative for altered mental status.          Objective:     Temp:  [97.7 °F (36.5 °C)-99.1 °F (37.3 °C)] 97.7 °F (36.5 °C)  Heart Rate:  [] 60  Resp:  [16] 16  BP: (126-139)/(51-68) 126/60     Intake/Output Summary (Last 24 hours) at 1/13/2025 1201  Last data filed at 1/13/2025 1135  Gross per 24 hour   Intake 360 ml   Output --   Net 360 ml     Body mass index is 19.92 kg/m².      01/05/25  2351 01/07/25  1153   Weight: 46.3 kg (102 lb) 46.3 kg (102 lb)         General Appearance:    Alert, cooperative, in no acute distress                                Head: Atraumatic, normocephalic, PERRLA               Neck:   supple, trachea midline, no thyromegaly, no carotid bruit, no JVD   Lungs:     Clear to auscultation, respirations regular, even and               unlabored    Heart:    Regular rhythm and normal rate, normal S1 and S2   Abdomen:     Normal bowel sounds, no masses, no organomegaly, soft  nontender, nondistended, no guarding, no rebound  tenderness   Extremities:   Moves all extremities well, mild edema, no cyanosis, no  redness   Pulses:   Pulses palpable and equal bilaterally   Skin:   No bleeding, bruising or rash   Neurologic:   Awake, alert, oriented x3   Unchanged from prior, scribed findings  "above      Lab Review:   Results from last 7 days   Lab Units 01/13/25  0044 01/12/25  0548 01/08/25  1514 01/08/25  0401 01/07/25  0453   SODIUM mmol/L 133* 134*   < > 134* 135*   POTASSIUM mmol/L 3.7 3.9   < > 3.6 3.1*   CHLORIDE mmol/L 101 103   < > 107 108*   CO2 mmol/L 27.2 24.1   < > 19.7* 16.9*   BUN mg/dL 8 11   < > 39* 50*   CREATININE mg/dL 0.61 0.51*   < > 0.86 1.09*   GLUCOSE mg/dL 130* 111*   < > 113* 97   CALCIUM mg/dL 7.9* 8.2*   < > 8.7 7.6*   AST (SGOT) U/L  --   --   --  33* 36*   ALT (SGPT) U/L  --   --   --  14 17    < > = values in this interval not displayed.         Results from last 7 days   Lab Units 01/10/25  0950 01/08/25  0401   WBC 10*3/mm3 7.54 3.80   HEMOGLOBIN g/dL 9.3* 10.8*   HEMATOCRIT % 28.8* 33.7*   PLATELETS 10*3/mm3 53* 43*         Results from last 7 days   Lab Units 01/07/25  0453   MAGNESIUM mg/dL 2.1           Invalid input(s): \"LDLCALC\"            Recent Radiology:  Imaging Results (Most Recent)       Procedure Component Value Units Date/Time    XR Chest 1 View [[id number]] Collected: 01/13/25 1033     Updated: 01/13/25 1037    Narrative:      XR CHEST 1 VW    Date of Exam: 1/13/2025 10:10 AM EST    Indication: picc placement    Comparison: 1/6/2025 radiographs    Findings:  Right approach central venous catheter distal tip overlies the superior vena cava. Extensive cardiac surgical changes with median sternotomy wires. Left chest wall cardiac device in unchanged position. Enlarged cardiac silhouette. Cloudy bilateral   airspace opacities. Trace bilateral pleural effusions. No pneumothorax. No acute bone abnormality. Cervical fusion hardware noted.      Impression:      Impression:  1.Right approach central venous catheter distal tip overlies the superior vena cava. No pneumothorax.  2.Moderate pulmonary edema with trace bilateral pleural effusions.        Electronically Signed: Esteban Yeung MD    1/13/2025 10:35 AM EST    Workstation ID: [id number]    US Renal Bilateral " [[id number]] Collected: 01/06/25 0932     Updated: 01/06/25 0935    Narrative:      US RENAL BILATERAL    Date of Exam: 1/6/2025 9:08 AM EST    Indication: JOSE, r/o obstruction.    Comparison: No comparisons available.    Technique: Grayscale and color Doppler ultrasound evaluation of the kidneys and urinary bladder was performed.      Findings:  Right kidney measures 9.8 cm length. Left kidney measures 9.7 cm length. Both kidneys are normal in echogenicity. No hydronephrosis. Grossly unremarkable incompletely distended urinary bladder      Impression:      Impression:  Normal ultrasound appearance of the kidneys with no evidence of obstructive uropathy        Electronically Signed: Roque Penn    1/6/2025 9:33 AM EST    Workstation ID: [id number]    CT Cervical Spine Without Contrast [[id number]] Collected: 01/06/25 0158     Updated: 01/06/25 0206    Narrative:      CT CERVICAL SPINE WO CONTRAST    Date of Exam: 1/6/2025 1:09 AM EST    Indication: Neck pain after fall.    Comparison: 3/29/2023    Technique: Axial CT images were obtained of the cervical spine without contrast administration.  Sagittal and coronal reconstructions were performed.  Automated exposure control and iterative reconstruction methods were used.    Findings:  Patient is fused from C3-C7 with an anterior plate and screws from C3-C6. Cervical alignment is normal. There is multilevel facet arthropathy. There is disc space narrowing at C7-T1. There is scoliosis at the cervicothoracic junction. No acute cervical   spine fracture is identified. Paraspinal soft tissues are grossly normal.      Impression:      No acute cervical spine fracture. No significant interval change.      Electronically Signed: Brad Foster MD    1/6/2025 2:04 AM EST    Workstation ID: [id number]    CT Facial Bones Without Contrast [[id number]] Collected: 01/06/25 0154     Updated: 01/06/25 0200    Narrative:      CT FACIAL BONES WO CONTRAST    Date of Exam: 1/6/2025 1:09 AM  EST    Indication: Fall with bruising.    Comparison: CT orbit 10/12/2018    Technique: Axial CT images were obtained from the inferior aspect of the mandible through the frontal sinuses without contrast administration.  Sagittal and coronal reconstructions were performed.  Automated exposure control and iterative reconstruction   methods were used.    Findings:  Temporomandibular joints demonstrate normal alignment. The mandible is intact. No acute facial bone fracture. No evidence of acute sinus disease. The globes and orbits appear grossly normal.      Impression:      Negative facial bone CT.        Electronically Signed: Brad Foster MD    1/6/2025 1:58 AM EST    Workstation ID: [id number]    CT Abdomen Pelvis Without Contrast [[id number]] Collected: 01/06/25 0144     Updated: 01/06/25 0158    Narrative:      CT ABDOMEN PELVIS WO CONTRAST    Date of Exam: 1/6/2025 1:09 AM EST    Indication: lower abdominal pain, diarrhea.    Comparison: CT abdomen pelvis August 5, 2024    Technique: Axial CT images were obtained of the abdomen and pelvis without the administration of contrast. Sagittal and coronal reconstructions were performed.  Automated exposure control and iterative reconstruction methods were used.    Findings:  Lung Bases:     There is mild right middle lobe atelectasis. There is bilateral basilar atelectasis. The heart is enlarged. There are postsurgical changes of the pulmonary artery. Heavy coronary artery calcifications are seen.    Liver:  The liver is of normal size. There are no focal liver lesions with noncontrast technique.    Biliary/Gallbladder:    Layering density in the gallbladder may be stones or sludge. The biliary tree is normal.    Spleen:  There is mild splenomegaly.    Pancreas:    Pancreas is normal. There is no evidence of pancreatic mass or peripancreatic fluid.    Kidneys:    There is a nonobstructing stone of the left kidney. There is no hydronephrosis of either  kidney.    Adrenals:    Adrenal glands are unremarkable.    Retroperitoneal/Lymph Nodes/Vasculature:    No retroperitoneal adenopathy is identified.    Gastrointestinal/Mesentery:    The bowel loops are non-dilated without wall thickening or mass. The appendix appears within normal limits. No evidence of obstruction. No free air. No mesenteric fluid collections identified.    Bladder:    The bladder is normal.    Genital:     Unremarkable          Bony Structures:     There are old compression fractures of T12 and L1. A benign hemangioma of L4 is unchanged. There is no acute fracture.        Impression:      Impression:  1.No acute abdominal or pelvic abnormality.  2.Mild splenomegaly.  3.Nonobstructing left renal stone.  4.Layering density in the gallbladder may be stones or sludge.                Electronically Signed: Rocky Heredia MD    1/6/2025 1:56 AM EST    Workstation ID: [id number]    CT Head Without Contrast [[id number]] Collected: 01/06/25 0148     Updated: 01/06/25 0156    Narrative:      CT HEAD WO CONTRAST    HISTORY:  Head injury from fall. Bruising.    TECHNIQUE:  Axial unenhanced head CT with coronal reformats. Radiation dose reduction techniques included automated exposure control or exposure modulation based on body size.    COMPARISON:  11/2/2024, 11/5/2023    FINDINGS:  Ventricular size and configuration are normal. No acute infarct or hemorrhage is identified. There are no masses. There is no skull fracture. Atherosclerotic calcifications are present in the vertebral arteries and carotid siphons. Mild chronic small   vessel ischemic changes are present in the white matter.      Impression:      Senescent changes without acute abnormality.          Electronically Signed: Brad Foster MD    1/6/2025 1:54 AM EST    Workstation ID: [id number]    XR Chest 1 View [[id number]] Collected: 01/06/25 0036     Updated: 01/06/25 0040    Narrative:      XR CHEST 1 VW    Date of Exam: 1/6/2025 12:20 AM  EST    Indication: cough, soa    Comparison: 11/2/2024    Findings:  There is a small stent graft in the proximal descending aorta. The heart remains enlarged status post CABG and valve repair. Left-sided multipolar pacer is present. Patient is status post mastectomy with bilateral breast reconstructions. Patient is also   status post cervical spinal fusion. No acute infiltrates are identified. No pneumothorax.      Impression:      1.Cardiomegaly with postoperative changes of CABG and valve repair.  2.No acute infiltrates identified.        Electronically Signed: Brad Foster MD    1/6/2025 12:38 AM EST    Workstation ID: [id number]            ECHOCARDIOGRAM:    Results for orders placed during the hospital encounter of 01/05/25    Adult Transthoracic Echo Complete W/ Cont if Necessary Per Protocol    Interpretation Summary    Left ventricular systolic function is normal. Left ventricular ejection fraction appears to be 56 - 60%.    Left ventricular diastolic function is consistent with (grade II w/high LAP) pseudonormalization.    Moderately reduced right ventricular systolic function noted.    The right ventricular cavity is severely dilated.    The left atrial cavity is moderate to severely dilated.    Left atrial volume is moderately increased.    The right atrial cavity is mildly  dilated.    Moderate tricuspid valve regurgitation is present.    Estimated right ventricular systolic pressure from tricuspid regurgitation is moderately elevated (45-55 mmHg).    Moderate to severe pulmonary hypertension is present.    There is a bioprosthetic pulmonic valve present.        I reviewed the patient's new clinical results.    EKG:      Assessment:       JOSE (acute kidney injury)    Severe protein-calorie malnutrition    Nausea / diarrhea   JOSE  Group B bacteremia- ID requesting GREGORY---no valvular vegetation but questionable Thrombus vs. Vegetation on one of her PPM leads  history of congenital heart disease with  tetralogy of Fallot with surgically repaired VSD  complete heart block with dual-chamber pacemaker  Hypertension  Hyperlipidemia  pulm hypertension / valvular heart disease in conjunction with tetralogy with both pulmonic and tricuspid valve abnormalities  DM II  metastatic breast cancer diagnosed 2017 with bone mets diagnosed in 2021, history of bilateral mastectomy.  Under therapy evaluation also pain management from oncology standpoint  She also history of COVID 2021 she was admitted recently in February 2023 with chest pain atypical nature thought to be secondary to metastatic disease  She has no history of obstructive CAD or coronary intervention       Plan:   GREGORY showed possible thrombus vs. Vegetation on one of her PPM leads  ID planning IV antibiotics x 6 weeks with repeat GREGORY in 6 weeks, along with repeat blood cultures  Follow-up blood cultures and if recurrent may need laser lead extraction of leads along with replacement of leads and device given she is dependent versus leadless pacemaker placement at that time  Repeat transesophageal echo after completion of antibiotics will be needed  Daily lasix added today    Outpt f/u with cardiology in 4 weeks - 6 weeks to get GREGORY arranged     okay to discharge from a cardiac standpoint once final arrangements for antibiotic administration with home health Has been finalized    Stanley Lopez MD, PhD    Zandra Quiroz, APRN  01/13/25  12:01 EST

## 2025-01-14 ENCOUNTER — TRANSITIONAL CARE MANAGEMENT TELEPHONE ENCOUNTER (OUTPATIENT)
Dept: CALL CENTER | Facility: HOSPITAL | Age: 65
End: 2025-01-14
Payer: MEDICARE

## 2025-01-14 NOTE — OUTREACH NOTE
Prep Survey      Flowsheet Row Responses   [facility] facility patient discharged from? [facility]   Is LACE score < 7 ? No   Eligibility [facility]   Date of Admission 01/05/25   Date of Discharge 01/13/25   Discharge Disposition Home or Self Care   Discharge diagnosis JOSE   Does the patient have one of the following disease processes/diagnoses(primary or secondary)? Other   Does the patient have Home health ordered? No   Is there a DME ordered? No   Comments regarding appointments [facility] for IV abx   Medication alerts for this patient IV abx   Prep survey completed? Yes            MCKENZIE QUICK - Registered Nurse

## 2025-01-14 NOTE — CASE MANAGEMENT/SOCIAL WORK
Case Management Discharge Note      Final Note: Routine home         Selected Continued Care - Discharged on 1/13/2025 Admission date: 1/5/2025 - Discharge disposition: Home or Self Care       Patient now refusing SNF. Tried to educate on safe discharge plan, does not voice understanding. Patient alert and oriented, wants to discharge home with daughter in [place]    Message sent to Lauryn with [facility], will be here shortly to teach patient., will also set up for outpatient labs and lab care with [facility]'s office in [place].     Message sent to [facility] liasion to update.            Transportation Services  Private: Car    Final Discharge Disposition Code: 01 - home or self-care

## 2025-01-14 NOTE — OUTREACH NOTE
Call Center TCM Note      Flowsheet Row Responses   [facility] patient discharged from? [facility]   Does the patient have one of the following disease processes/diagnoses(primary or secondary)? Other   TCM attempt successful? Yes   Call start time 1422   Call end time 1425   Discharge diagnosis JOSE   Medication alerts for this patient IV abx   Meds reviewed with patient/caregiver? Yes   Is the patient having any side effects they believe may be caused by any medication additions or changes? No   Does the patient have all medications ordered at discharge? Yes   Is the patient taking all medications as directed (includes completed medication regime)? No   What is preventing the patient from taking all medications as directed? Other  [To  meds from [facility] pharmacy.]   Nursing Interventions Nurse provided patient education   Does the patient have an appointment with their PCP within 7-14 days of discharge? No   Nursing Interventions Patient declined scheduling/rescheduling appointment at this time   Has home health visited the patient within 72 hours of discharge? N/A   Psychosocial issues? No   Did the patient receive a copy of their discharge instructions? Yes   Nursing interventions Reviewed instructions with patient   What is the patient's perception of their health status since discharge? Improving   Is the patient/caregiver able to teach back signs and symptoms related to disease process for when to call PCP? Yes   Is the patient/caregiver able to teach back signs and symptoms related to disease process for when to call 911? Yes   Is the patient/caregiver able to teach back the hierarchy of who to call/visit for symptoms/problems? PCP, Specialist, Home health nurse, Urgent Care, ED, 911 Yes   If the patient is a current smoker, are they able to teach back resources for cessation? Not a smoker   TCM call completed? Yes   Wrap up additional comments Pt. reports doing well, received IV antibiotic this  morning. No questions/concerns at this time.   Call end time 1425   Would this patient benefit from a Referral to [facility] Social Work? No   Is the patient interested in additional calls from an ambulatory ? No            Ivis Betancourt RN    1/14/2025, 14:27 EST

## 2025-01-14 NOTE — PAYOR COMM NOTE
"This is discharge notification for Lucien Garrison  Reference/Auth # [id number]   Pt discharged on 1/13/25    Bekah Patel, RN, BSN  Utilization Review Nurse  [facility]  Direct & confidential phone # [phone]  Fax # [phone]      Lucien Garrison (64 y.o. Female)       Date of Birth   1960    Social Security Number       Address   [address] [place] [address] [address] [address]    Home Phone   [phone]    MRN   [id number]       Latter-day   None    Marital Status   Legally                             Admission Date   1/5/25    Admission Type   Emergency    Admitting Provider   Llanes Alvarez, Carlos, MD    Attending Provider       Department, Room/Bed   [facility] 2D, 273/A       Discharge Date   1/13/2025    Discharge Disposition   Home or Self Care    Discharge Destination                                 Attending Provider: (none)   Allergies: Oxycodone, Promethazine, Tape, Adhesive Tape, Flexeril [Cyclobenzaprine]    Isolation: None   Infection: None   Code Status: Prior    Ht: 152.4 cm (60\")   Wt: 46.3 kg (102 lb)    Admission Cmt: None   Principal Problem: JOSE (acute kidney injury) [N17.9]                   Active Insurance as of 1/5/2025       Primary Coverage       Payor Plan Insurance Group Employer/Plan Group    AETNA MEDICARE REPLACEMENT [id number]       Payor Plan Address Payor Plan Phone Number Payor Plan Fax Number Effective Dates    [address] [phone]  1/1/2024 - None Entered    [facility] [id number]         Subscriber Name Subscriber Birth Date Member ID       LUCIEN GARRISON 1960 [id number]                     Emergency Contacts        (Rel.) Home Phone Work Phone Mobile Phone    ANGELLA GARRISON (Spouse) [phone]                 Discharge Summary        Ranjit Foss MD at 01/13/25 [address] [place] [address] Medicine Services  Discharge Summary    Date of " Service: 2025  Patient Name: Gladys Garrison  : 1960  MRN: [id number]    Date of Admission: 2025  Discharge Diagnosis: #Hypotension due to volume depletion-resolved  #Diarrheal illness-resolved. Suspect viral in etiology   #Oliguric JOSE due to hypovolemia-resolved  #Metabolic acidosis  #Group B strep bacteremia with ICD lead vegetation  #History breast cancer with bone metastasis  #Thrombocytopenia due to abemaciclib     Date of Discharge: 2025  Primary Care Physician: Chirag Robles DO      Presenting Problem:   Diarrhea of presumed infectious origin [R19.7]  Acute abdominal pain [R10.9]  JOSE (acute kidney injury) [N17.9]  Neck sprain, initial encounter [S13.9XXA]  Contusion of face, initial encounter [S00.83XA]  Injury of head, initial encounter [S09.90XA]  Sepsis, due to unspecified organism, unspecified whether acute organ dysfunction present [A41.9]    Active and Resolved Hospital Problems:  Active Hospital Problems    Diagnosis POA    **JOSE (acute kidney injury) [N17.9] Yes    Severe protein-calorie malnutrition [E43] Yes      Resolved Hospital Problems   No resolved problems to display.         Hospital Course     HPI:  Admitting team HPI   Gladys Garrison is a 64 y.o. female with a PMH of breast cancer with metastasis to bone, complex cardiac medical history including tetralogy of Fallot, coarctation of aorta, VSD status post repair, coarctation of aorta S/P stent who presented to [facility] on 2025 with diarrhea since around last Tuesday it is dark color, associated with nausea and multiple episodes of vomiting.      Hospital Course:    #Hypotension due to volume depletion-resolved  #Diarrheal illness-resolved. Suspect viral in etiology   #Oliguric JOSE due to hypovolemia-resolved  #Metabolic acidosis  -Patient was initiated on IV fluids with improvement in renal function back to baseline  -Patient states her diarrhea has improved although her p.o. intake is still  somewhat poor  -Added nutritional supplement with meals.  Diet gradually advanced tolerating well  -Antibiotics as below  -Currently on amlodipine 5 mg daily BP stable  -Lasix 20 mg daily continue     #Group B strep bacteremia with ICD lead vegetation  -Unclear source although this could be related to UTI  -Urine culture with 50,000 CFU GBS  -Repeat blood cultures from 1/8 with no growth  -ID consult appreciated  -Continue ceftriaxone 2 g daily plan for total 6 weeks of antibiotics.  End date 10/15/2025.  PICC line in place.  Weekly labs CBC and creatinine  -GREGORY confirmed vegetation on PPM/ICD lead.  Patient will need repeat GREGORY after 6 weeks of antibiotics  -Per cardiology patient is a poor candidate for lead extraction.  Plan is for 6 weeks of antibiotics may need to consider referral for laser lead extraction and leadless pacemaker       Possible UTI  -UA on admission suggestive of possible infection  -Suspect ascending infection in setting of bacteremia.  Ceftriaxone as above     #History breast cancer with bone metastasis  #Thrombocytopenia due to abemaciclib   Seen by oncology appreciate recommendations  Plan to hold off on abemaciclib until clincall improvement   Out pt follow up with oncology   Continue with intrathecal pain pump    #DM2  Continue home regimen         DISCHARGE Follow Up Recommendations for labs and diagnostics:   Finish total 6 weeks of antibiotics per infectious disease.  PICC line to be removed after course finishes  Weekly labs CBC and creatinine  Outpatient follow-up with cardiology  Patient would benefit from repeat TTE after course of antibiotics  Outpatient follow-up with oncology    Patient acute rehab placement versus to go home        Day of Discharge     Vital Signs:  Temp:  [97.7 °F (36.5 °C)-98.5 °F (36.9 °C)] 97.7 °F (36.5 °C)  Heart Rate:  [] 60  Resp:  [16] 16  BP: (126-144)/(51-63) 144/60    Physical Exam:  Physical Exam   AOx3 NAD  RRR S1 and S2 audible  Lungs were  clear currently  Abdomen soft nontender nondistended      Pertinent  and/or Most Recent Results     LAB RESULTS:      Lab 01/10/25  0950 01/08/25  0401 01/07/25  0453   WBC 7.54 3.80 4.64   HEMOGLOBIN 9.3* 10.8* 10.7*   HEMATOCRIT 28.8* 33.7* 34.1   PLATELETS 53* 43* 36*   NEUTROS ABS 6.51 2.66 3.57   IMMATURE GRANS (ABS) 0.16* 0.11*  --    LYMPHS ABS 0.28* 0.40*  --    MONOS ABS 0.56 0.61  --    EOS ABS 0.02 0.01 0.05   MCV 92.6 92.8 95.3         Lab 01/13/25  0044 01/12/25  0548 01/11/25  1648 01/11/25  0523 01/10/25  0606 01/09/25  1212 01/08/25  1514 01/08/25  0401 01/07/25  0453   SODIUM 133* 134*  --  136 135* 137  --  134* 135*   POTASSIUM 3.7 3.9 3.6 3.5 4.1 4.0 4.0 3.6 3.1*   CHLORIDE 101 103  --  104 107 107  --  107 108*   CO2 27.2 24.1  --  23.2 19.8* 21.4*  --  19.7* 16.9*   ANION GAP 4.8* 6.9  --  8.8 8.2 8.6  --  7.3 10.1   BUN 8 11  --  15 18 23  --  39* 50*   CREATININE 0.61 0.51*  --  0.60 0.61 0.74  --  0.86 1.09*   EGFR 101.2 105.7  --  101.6 101.2 91.6  --  76.5 57.6*   GLUCOSE 130* 111*  --  102* 118* 107*  --  113* 97   CALCIUM 7.9* 8.2*  --  8.4* 8.1* 8.5*  --  8.7 7.6*   MAGNESIUM  --   --   --   --   --   --   --   --  2.1   PHOSPHORUS  --   --   --   --   --   --  2.4* 1.7* 2.0*         Lab 01/08/25  0401 01/07/25  0453   TOTAL PROTEIN 5.7* 5.4*   ALBUMIN 3.0* 2.8*   GLOBULIN 2.7 2.6   ALT (SGPT) 14 17   AST (SGOT) 33* 36*   BILIRUBIN 0.5 0.7   ALK PHOS 52 39                     Lab 01/07/25  1453   PH, ARTERIAL 7.370   PCO2, ARTERIAL 30.8*   PO2 ART 95.8   O2 SATURATION ART 97.4   FIO2 28   HCO3 ART 17.8*   BASE EXCESS ART -6.5*     Brief Urine Lab Results  (Last result in the past 365 days)        Color   Clarity   Blood   Leuk Est   Nitrite   Protein   CREAT   Urine HCG        01/07/25 1513             78.3               Microbiology Results (last 10 days)       Procedure Component Value - Date/Time    Blood Culture - Blood, Hand, Right [[id number]]  (Normal) Collected: 01/08/25 1112     Lab Status: Final result Specimen: Blood from Hand, Right Updated: 01/13/25 1130     Blood Culture No growth at 5 days    Urine Culture - Urine, Straight Cath [[id number]]  (Abnormal) Collected: 01/06/25 0035    Lab Status: Final result Specimen: Urine from Straight Cath Updated: 01/07/25 1035     Urine Culture 50,000 CFU/mL Streptococcus agalactiae (Group B)     Comment:   This organism is considered to be universally susceptible to penicillin.  No further antibiotic testing will be performed.       Narrative:      Colonization of the urinary tract without infection is common. Treatment is discouraged unless the patient is symptomatic, pregnant, or undergoing an invasive urologic procedure.    Blood Culture - Blood, Arm, Right [[id number]]  (Abnormal) Collected: 01/06/25 0033    Lab Status: Final result Specimen: Blood from Arm, Right Updated: 01/08/25 0707     Blood Culture Streptococcus agalactiae (Group B)     Isolated from Aerobic and Anaerobic Bottles     Gram Stain Anaerobic Bottle Gram positive cocci in pairs and chains      Aerobic Bottle Gram positive cocci in pairs and chains    Narrative:      Refer to previous blood culture collected on 01/06/2025 at 0032 for MICs.        Less than seven (7) mL's of blood was collected.  Insufficient quantity may yield false negative results.    Blood Culture - Blood, Arm, Left [[id number]]  (Abnormal)  (Susceptibility) Collected: 01/06/25 0032    Lab Status: Final result Specimen: Blood from Arm, Left Updated: 01/08/25 0706     Blood Culture Streptococcus agalactiae (Group B)     Isolated from Aerobic and Anaerobic Bottles     Gram Stain Anaerobic Bottle Gram positive cocci in pairs and chains      Aerobic Bottle Gram positive cocci in pairs and chains    Susceptibility        Streptococcus agalactiae (Group B)      MICK      Ceftriaxone Susceptible      Penicillin G Susceptible      Vancomycin Susceptible                           Blood Culture ID, PCR - Blood, Arm, Left  [[id number]]  (Abnormal) Collected: 01/06/25 0032    Lab Status: Final result Specimen: Blood from Arm, Left Updated: 01/06/25 1200     BCID, PCR Streptococcus agalactiae (Group B). Identification by BCID2 PCR.     BOTTLE TYPE Anaerobic Bottle    Respiratory Panel PCR w/COVID-19(SARS-CoV-2) [facility]/COR/SHARDA In-House, NP Swab in UTM/VTM, 2 HR TAT - Swab, Nasopharynx [[id number]]  (Normal) Collected: 01/06/25 0024    Lab Status: Final result Specimen: Swab from Nasopharynx Updated: 01/06/25 0130     ADENOVIRUS, PCR Not Detected     Coronavirus 229E Not Detected     Coronavirus HKU1 Not Detected     Coronavirus NL63 Not Detected     Coronavirus OC43 Not Detected     COVID19 Not Detected     Human Metapneumovirus Not Detected     Human Rhinovirus/Enterovirus Not Detected     Influenza A PCR Not Detected     Influenza B PCR Not Detected     Parainfluenza Virus 1 Not Detected     Parainfluenza Virus 2 Not Detected     Parainfluenza Virus 3 Not Detected     Parainfluenza Virus 4 Not Detected     RSV, PCR Not Detected     Bordetella pertussis pcr Not Detected     Bordetella parapertussis PCR Not Detected     Chlamydophila pneumoniae PCR Not Detected     Mycoplasma pneumo by PCR Not Detected    Narrative:      In the setting of a positive respiratory panel with a viral infection PLUS a negative procalcitonin without other underlying concern for bacterial infection, consider observing off antibiotics or discontinuation of antibiotics and continue supportive care. If the respiratory panel is positive for atypical bacterial infection (Bordetella pertussis, Chlamydophila pneumoniae, or Mycoplasma pneumoniae), consider antibiotic de-escalation to target atypical bacterial infection.            XR Chest 1 View    Result Date: 1/13/2025  Impression: Impression: 1.Right approach central venous catheter distal tip overlies the superior vena cava. No pneumothorax. 2.Moderate pulmonary edema with trace bilateral pleural effusions.  Electronically Signed: Esteban Yeung MD  1/13/2025 10:35 AM EST  Workstation ID: [id number]    US Renal Bilateral    Result Date: 1/6/2025  Impression: Impression: Normal ultrasound appearance of the kidneys with no evidence of obstructive uropathy Electronically Signed: Roque Penn  1/6/2025 9:33 AM EST  Workstation ID: [id number]    CT Cervical Spine Without Contrast    Result Date: 1/6/2025  Impression: No acute cervical spine fracture. No significant interval change. Electronically Signed: Brad Foster MD  1/6/2025 2:04 AM EST  Workstation ID: [id number]    CT Facial Bones Without Contrast    Result Date: 1/6/2025  Impression: Negative facial bone CT. Electronically Signed: Brad Foster MD  1/6/2025 1:58 AM EST  Workstation ID: [id number]    CT Abdomen Pelvis Without Contrast    Result Date: 1/6/2025  Impression: Impression: 1.No acute abdominal or pelvic abnormality. 2.Mild splenomegaly. 3.Nonobstructing left renal stone. 4.Layering density in the gallbladder may be stones or sludge. Electronically Signed: Rocky Heredia MD  1/6/2025 1:56 AM EST  Workstation ID: [id number]    CT Head Without Contrast    Result Date: 1/6/2025  Impression: Senescent changes without acute abnormality. Electronically Signed: Brad Foster MD  1/6/2025 1:54 AM EST  Workstation ID: [id number]    XR Chest 1 View    Result Date: 1/6/2025  Impression: 1.Cardiomegaly with postoperative changes of CABG and valve repair. 2.No acute infiltrates identified. Electronically Signed: Brad Foster MD  1/6/2025 12:38 AM EST  Workstation ID: [id number]     Results for orders placed during the hospital encounter of 08/04/23    Duplex Venous Lower Extremity - Bilateral CAR    Interpretation Summary    Normal bilateral lower extremity venous duplex scan.      Results for orders placed during the hospital encounter of 08/04/23    Duplex Venous Lower Extremity - Bilateral CAR    Interpretation Summary    Normal bilateral lower extremity venous duplex  scan.      Results for orders placed during the hospital encounter of 01/05/25    Adult Transthoracic Echo Complete W/ Cont if Necessary Per Protocol    Interpretation Summary    Left ventricular systolic function is normal. Left ventricular ejection fraction appears to be 56 - 60%.    Left ventricular diastolic function is consistent with (grade II w/high LAP) pseudonormalization.    Moderately reduced right ventricular systolic function noted.    The right ventricular cavity is severely dilated.    The left atrial cavity is moderate to severely dilated.    Left atrial volume is moderately increased.    The right atrial cavity is mildly  dilated.    Moderate tricuspid valve regurgitation is present.    Estimated right ventricular systolic pressure from tricuspid regurgitation is moderately elevated (45-55 mmHg).    Moderate to severe pulmonary hypertension is present.    There is a bioprosthetic pulmonic valve present.      Labs Pending at Discharge:  Pending Results       Procedure [Order ID] Specimen - Date/Time    Adult Transesophageal Echo (GREGORY) W/ Cont if Necessary Per Protocol [[id number]] Resulted: 01/09/25 1446     Updated: 01/09/25 1446      CV ECHO SHUNT ASSESSMENT PERFORMED (HIDDEN SCRIPTING) 1     TR max eric 289.0 cm/sec      TR max PG 33.4 mmHg             Procedures Performed           Consults:   Consults       Date and Time Order Name Status Description    1/7/2025  3:18 PM Inpatient Cardiology Consult Completed     1/7/2025  9:19 AM Hematology & Oncology Inpatient Consult Completed     1/7/2025  3:58 AM Inpatient Nephrology Consult Completed     1/6/2025  2:22 PM Inpatient Infectious Diseases Consult Completed     1/6/2025  5:13 AM Inpatient Gastroenterology Consult Completed     1/6/2025  3:58 AM Inpatient Hospitalist Consult                Discharge Details        Discharge Medications        New Medications        Instructions Start Date   amLODIPine 5 MG tablet  Commonly known as: NORVASC   5  "mg, Oral, Every 24 Hours Scheduled      cefTRIAXone 2,000 mg in sodium chloride 0.9 % 100 mL IVPB   2,000 mg, Intravenous, Every 24 Hours      furosemide 20 MG tablet  Commonly known as: LASIX   20 mg, Oral, Daily             Changes to Medications        Instructions Start Date   diphenoxylate-atropine 2.5-0.025 MG per tablet  Commonly known as: LOMOTIL  What changed:   when to take this  reasons to take this   1 tablet, Oral, 4 Times Daily PRN             Continue These Medications        Instructions Start Date   Accu-Chek Araceli device   Use as instructed   To test blood sugar bid      B-D UF III MINI PEN NEEDLES 31G X 5 MM misc  Generic drug: Insulin Pen Needle   Use to inject insulin twice daily   DX: E11.9      Calcium 600+D Plus Minerals 600-400 MG-UNIT chewable tablet   600 mg, Oral, 2 Times Daily      cyclobenzaprine 10 MG tablet  Commonly known as: FLEXERIL   10 mg, Oral, 2 Times Daily PRN      exemestane 25 MG tablet  Commonly known as: AROMASIN   25 mg, Daily      famotidine 20 MG tablet  Commonly known as: PEPCID   20 mg, 2 Times Daily PRN      FreeStyle Rajeev 14 Day [place] device   1 each, Not Applicable, Daily      FreeStyle Rajeev 14 Day Sensor misc   1 each, Not Applicable, Daily      HYDROcodone-acetaminophen  MG per tablet  Commonly known as: NORCO   1 tablet, Oral, Every 4 Hours PRN      insulin NPH-insulin regular (70-30) 100 UNIT/ML injection  Commonly known as: humuLIN 70/30,novoLIN 70/30   5 Units, Subcutaneous, Every Evening, If BS over 180      Insulin Syringe-Needle U-100 28G X 1/2\" 1 ML misc   1 each, Not Applicable, 2 Times Daily      Morphine Sulfate (PF) 8 MG/ML solution   8.5 mg, Daily      ondansetron ODT 4 MG disintegrating tablet  Commonly known as: ZOFRAN-ODT   8 mg, Translingual, Every 8 Hours PRN      rosuvastatin 20 MG tablet  Commonly known as: Crestor   20 mg, Oral, Nightly             Stop These Medications      abemaciclib 100 mg tablet chemo tablet  Commonly known as: " VERZENIO     fluticasone 50 MCG/ACT nasal spray  Commonly known as: FLONASE     ibuprofen 800 MG tablet  Commonly known as: ADVIL,MOTRIN     losartan 50 MG tablet  Commonly known as: Cozaar     Naloxegol Oxalate 12.5 MG tablet  Commonly known as: Movantik              Allergies   Allergen Reactions    Oxycodone Nausea And Vomiting    Promethazine Other (See Comments)     Hyperactive mean    Tape Other (See Comments)     .blisters      Adhesive Tape Rash    Flexeril [Cyclobenzaprine] Rash         Discharge Disposition:   Home or Self Care    Diet:  Hospital:  Diet Order   Procedures    Diet: Diabetic; Consistent Carbohydrate; Fluid Consistency: Thin (IDDSI 0)         Discharge Activity:         CODE STATUS:  Code Status and Medical Interventions: CPR (Attempt to Resuscitate); Full Support   Ordered at: 01/07/25 1310     Code Status (Patient has no pulse and is not breathing):    CPR (Attempt to Resuscitate)     Medical Interventions (Patient has pulse or is breathing):    Full Support         Future Appointments   Date Time Provider Department Center   1/14/2025  2:00 PM Chuck Hunter MD [facility] PAIN  [facility]   1/20/2025 11:20 AM Geovani Pimentel MD [facility] PM EASPT [facility]   1/22/2025  3:10 PM LAB MD [facility] ONC LAB NA  [facility]   1/22/2025  3:15 PM Мария Nunez MD [facility] ONC [facility]       Additional Instructions for the Follow-ups that You Need to Schedule       Discharge Follow-up with Specified Provider: Follow up with cardiology in 6 weeks; 6 Weeks   As directed      To: Follow up with cardiology in 6 weeks   Follow Up: 6 Weeks                Time spent on Discharge including face to face service:  55 minutes    Signature: Electronically signed by Ranjit Foss MD, 01/13/25, 17:22 EST.  [facility] Hospitalist Team      Electronically signed by Ranjit Foss MD at 01/13/25 3476

## 2025-01-16 ENCOUNTER — TELEPHONE (OUTPATIENT)
Dept: PAIN MEDICINE | Facility: CLINIC | Age: 65
End: 2025-01-16

## 2025-01-16 NOTE — TELEPHONE ENCOUNTER
Caller: Gladys Garrison    Relationship to patient: Self    Best call back number:     Chief complaint: Follow Up/ Pain Pump from Dr. Hunter     Type of visit: FOLLOW UP    Requested date: END OF JANUARY OR BEGINNING OF FEBRUARY    If rescheduling, when is the original appointment: 1/20/25

## 2025-01-17 ENCOUNTER — SPECIALTY PHARMACY (OUTPATIENT)
Dept: PHARMACY | Facility: HOSPITAL | Age: 65
End: 2025-01-17
Payer: MEDICARE

## 2025-01-17 LAB
BH CV ECHO MEAS - TR MAX PG: 33.4 MMHG
BH CV ECHO MEAS - TR MAX VEL: 289 CM/SEC
BH CV ECHO SHUNT ASSESSMENT PERFORMED (HIDDEN SCRIPTING): 1

## 2025-01-17 NOTE — PROGRESS NOTES
Specialty Pharmacy Note    Received letter from [employer] showing that Darlinea is approved for patient assistance until the end of year 2025 for free drug verzenio.    Gregorio Grimm - CARE COORDINATOR  1/17/2025  16:22 EST

## 2025-01-20 ENCOUNTER — TELEPHONE (OUTPATIENT)
Dept: FAMILY MEDICINE CLINIC | Facility: CLINIC | Age: 65
End: 2025-01-20

## 2025-01-20 NOTE — TELEPHONE ENCOUNTER
Caller: Lucien Sargent MALLORIE    Relationship: Self    Best call back number: [phone]       PATIENT'S SON, LARRY SARGENT, IS FAXING US FMLA PAPERWORK FROM HIS EMPLOYER.    PATIENT LUCIEN HAS BEEN IN THE HOSPITAL AND HE HAD TO MISS WORK FOR THIS AND WANTS TO BE COVERED FOR UPCOMING DATES WHEN HE HAS TO HELP HER.    PLEASE BE ON THE LOOK OUT FOR THIS.

## 2025-01-22 ENCOUNTER — TELEPHONE (OUTPATIENT)
Dept: FAMILY MEDICINE CLINIC | Facility: CLINIC | Age: 65
End: 2025-01-22
Payer: MEDICARE

## 2025-01-22 NOTE — TELEPHONE ENCOUNTER
Caller: Gladys Garrison    Relationship: Self    Best call back number: [phone]        Who are you requesting to speak with (clinical staff, provider,  specific staff member): DR DELAROSA'S MA OR EAN       What was the call regarding: RECEIVING AN EMAIL FOR [employer] AND A FAX CONCERNING THE SAME. THEY ARE FOR HER CARE, HER SON IS THE ONE THEY ARE INQUIRING FOR, HE HAS COME TO TAKE CARE OF HER.    PLEASE ADVISE,     FAX WENT TO PRACTICE, EMAIL WENT TO EAN    Is it okay if the provider responds through MyChart: NO

## 2025-01-22 NOTE — PROGRESS NOTES
MTM Note: Ibrance    Called to check on adherence and side effects with Ibrance.  Gladys started a new treatment with Ibrance on 5/21.  Patient reports that she is feeling queasy and also has vomited several times since starting it.  She has used ondansetron without relief.  Confirmed that she was taking ondansetron correctly.  Dr. Nunez has prescribed prochlorperazine for patient to try.  Called Gladys back with this information and gave instructions to stop ondansetron and start prochlorperazine and could take every 6 hours as needed. Patient expressed understanding.  She has repeat labs on Friday and follows up with Dr. Nunez next week.   The right groin and right radial was prepped. The site was prepped with ChloraPrep. The site was clipped. The patient was draped.

## 2025-01-22 NOTE — TELEPHONE ENCOUNTER
I received patients FMLA forms through [employer] via email.  Forms printed and placed in Dr. Henry basket.

## 2025-01-27 ENCOUNTER — TELEPHONE (OUTPATIENT)
Dept: PAIN MEDICINE | Facility: CLINIC | Age: 65
End: 2025-01-27

## 2025-01-27 ENCOUNTER — TELEPHONE (OUTPATIENT)
Dept: CARDIOLOGY | Facility: CLINIC | Age: 65
End: 2025-01-27
Payer: MEDICARE

## 2025-01-27 NOTE — TELEPHONE ENCOUNTER
Provider: HARPAL    Caller: ISAI NEWTON/ [facility]    Relationship to Patient: HOME HEALTHCARE NURSE    Phone Number: [phone]    Reason for Call: ISAI STATES THAT PATIENT IS DUE FOR HER PUMP REFILL ON 02/04/25, SHE STATES THAT SHE REFILLS PATIENTS PUMP AT HOME. SHE STATES THAT PATIENT HAD AN APPT SCHEDULED ON 01/20/25, TRANSFER FROM DR. JERONIMO,  THAT SHE DIDN'T KEEP DUE TO BEING IN THE HOSPITAL. ISAI STATES SHE WILL NEED TO GET A PRESCRIPTION FOR PATIENT TO REFILL HER PUMP. PLEASE CALL HER TO DISCUSS.

## 2025-01-27 NOTE — TELEPHONE ENCOUNTER
Spoke with [facility] Nurse Tiffanie and informed her that the patient has not established care with our practice so we cannot send a prescription. I called the patient to reschedule an appointment and she stated that she was septic and has to antibiotics for 6 weeks. I asked her if she was home and she stated that she was in [place] with her son. I informed her that we cannot prescribe a medication for a patient that has not been seen by any provider in the office. I attempted to schedule an appointment and she hung up. I informed the [facility] nurse and she stated that she would talk tot he patient. The patient called back to schedule an appointment.

## 2025-01-27 NOTE — TELEPHONE ENCOUNTER
I spoke with patient regarding Remote device monitor.  Monitor is not connected.  Patient said that she has temporarily moved to [place] with her daughter in law while recovering from a recent hospital stay.  She did not bring her monitor with her.      Discussed follow up with patient.  She is looking into a Cardiologist in [place] until she feels comfortable coming back to [place].  She will call us to schedule a follow up if she is unable to see one there.

## 2025-01-29 ENCOUNTER — OFFICE VISIT (OUTPATIENT)
Dept: PAIN MEDICINE | Facility: CLINIC | Age: 65
End: 2025-01-29
Payer: MEDICARE

## 2025-01-29 VITALS
WEIGHT: 118 LBS | BODY MASS INDEX: 23.16 KG/M2 | HEART RATE: 85 BPM | DIASTOLIC BLOOD PRESSURE: 81 MMHG | OXYGEN SATURATION: 95 % | SYSTOLIC BLOOD PRESSURE: 172 MMHG | TEMPERATURE: 97.7 F | HEIGHT: 60 IN

## 2025-01-29 DIAGNOSIS — M51.362 DEGENERATION OF INTERVERTEBRAL DISC OF LUMBAR REGION WITH DISCOGENIC BACK PAIN AND LOWER EXTREMITY PAIN: Primary | ICD-10-CM

## 2025-01-29 DIAGNOSIS — Z79.899 HIGH RISK MEDICATION USE: ICD-10-CM

## 2025-01-29 DIAGNOSIS — M54.16 LUMBAR RADICULOPATHY: ICD-10-CM

## 2025-01-29 DIAGNOSIS — Z51.5 PALLIATIVE CARE STATUS: ICD-10-CM

## 2025-01-29 PROCEDURE — 3079F DIAST BP 80-89 MM HG: CPT | Performed by: PHYSICIAN ASSISTANT

## 2025-01-29 PROCEDURE — 99213 OFFICE O/P EST LOW 20 MIN: CPT | Performed by: PHYSICIAN ASSISTANT

## 2025-01-29 PROCEDURE — 1125F AMNT PAIN NOTED PAIN PRSNT: CPT | Performed by: PHYSICIAN ASSISTANT

## 2025-01-29 PROCEDURE — 1160F RVW MEDS BY RX/DR IN RCRD: CPT | Performed by: PHYSICIAN ASSISTANT

## 2025-01-29 PROCEDURE — 3077F SYST BP >= 140 MM HG: CPT | Performed by: PHYSICIAN ASSISTANT

## 2025-01-29 PROCEDURE — 1159F MED LIST DOCD IN RCRD: CPT | Performed by: PHYSICIAN ASSISTANT

## 2025-01-29 NOTE — PROGRESS NOTES
CHIEF COMPLAINT  Patient has neck and back pain. She has had breast cancer with Mets to her bones. She describes her pain as a constant aching, pressure with radiates into her left leg. She has a medtronic pain pump with morphine 5 mg/mL. She takes Norco sparingly.       UDS +opi    Subjective   Gladys Garrison is a 64 y.o. female  who presents for follow-up.  She has a history of cancer related pain.  This patient was a previous patient with Dr. Chuck Hunter at the [facility] and was last evaluated on 12/4/2024.  This patient has a history of metastatic breast cancer with lesions to the ribs as well as within the lumbar spine.  She continues to have pain within a transverse distribution across the lower spine which radiates into the bilateral lower extremities as well as experiencing chest and intercostal pain due to history of rib fractures.    Patient states that she had her pain pump implanted approximately 3 years ago when she was first diagnosed with breast cancer with bone metastases.  She continues to obtain chemotherapy and was obtaining Verzenio 150 mg however her last infusion was approximately 4 months ago as the medication was causing her to have recurrent UTIs.    Patient states that she does use Norco on a very sparing basis stating that a prescription will last her months as she uses it only for significant episodes of breakthrough pain.      History of Present Illness     PEG Assessment   What number best describes your pain on average in the past week?8  What number best describes how, during the past week, pain has interfered with your enjoyment of life?10  What number best describes how, during the past week, pain has interfered with your general activity?  8    Review of Pertinent Medical Data ---  NM BONE SCAN WHOLE BODY     Date of Exam: 8/5/2024 12:46 PM EDT     Indication: metastatic breast cancer.     Comparison: Whole body bone scan 1/8/2024, CT chest abdomen pelvis 8/5/2024      Technique: The patient received 24.6 mCi of technetium 99m MDP intravenously and delayed anterior and posterior whole body bone images were obtained.     Findings:  Areas of increased radiotracer uptake appears similar to the prior study involving the inferior aspect of the sternum and the L4 vertebral body. There is increased uptake overlying the right distal femoral metaphysis which could relate to activity in the   distal femur or the right patella when compared with the prior study. Uptake involving multiple right ribs related to remote healed rib fractures. Additional areas of degenerative uptake appears stable.     IMPRESSION:  Impression:  1. Stable radiotracer uptake involving sternum and L4 vertebral body related to known osseous metastasis.  2. Increased uptake overlying right knee may relate to activity within patella or distal femoral metaphysis, possibly degenerative, however consider follow-up right knee radiographs given increased uptake from prior exam.  3. Additional stable chronic findings above.           Electronically Signed: Westley Kirkpatrick MD    8/6/2024 5:39 PM EDT     The following portions of the patient's history were reviewed and updated as appropriate: allergies, current medications, past family history, past medical history, past social history, past surgical history, and problem list.    Review of Systems   Constitutional:  Positive for chills and fatigue. Negative for activity change (increased) and fever.   HENT:  Negative for congestion.    Eyes:  Negative for visual disturbance.   Respiratory:  Positive for shortness of breath. Negative for chest tightness.    Cardiovascular:  Negative for chest pain.   Gastrointestinal:  Positive for constipation. Negative for abdominal pain and diarrhea.   Genitourinary:  Negative for difficulty urinating, dyspareunia and dysuria.   Musculoskeletal:  Positive for back pain and neck pain.   Neurological:  Positive for weakness and light-headedness.  "Negative for dizziness, numbness and headaches.   Psychiatric/Behavioral:  Positive for agitation and sleep disturbance. Negative for self-injury and suicidal ideas. The patient is not nervous/anxious.    I have reviewed and confirmed the accuracy of the ROS as documented by the MA/LPN/RN SUDARSHAN Roy  Vitals:    01/29/25 0832   BP: 172/81   Pulse: 85   Temp: 97.7 °F (36.5 °C)   SpO2: 95%   Weight: 53.5 kg (118 lb)   Height: 152.4 cm (60\")   PainSc:   9   PainLoc: Neck  Comment: back         Objective   Physical Exam  Vitals and nursing note reviewed. Exam conducted with a chaperone present (Daughter-in-law).   Constitutional:       Appearance: Normal appearance. She is normal weight.   HENT:      Head: Normocephalic.   Musculoskeletal:      Lumbar back: Tenderness present.        Back:    Neurological:      Mental Status: She is alert and oriented to person, place, and time.      Cranial Nerves: Cranial nerves 2-12 are intact.      Sensory: Sensation is intact.   Psychiatric:         Mood and Affect: Mood normal.         Behavior: Behavior normal.         Thought Content: Thought content normal.         Judgment: Judgment normal.           Intrathecal pump was interrogated in office today. Results are in chart.    The patient is currently at a dose of 1.0773  mg of morphine  per day.     His only analyzed on today and shows a reservoir volume of 5.5 mL of morphine.    No changes were made to the daily rate.     Patient will continue with [facility] for home pump refills.            Assessment & Plan   Diagnoses and all orders for this visit:    1. Degeneration of intervertebral disc of lumbar region with discogenic back pain and lower extremity pain (Primary)    2. Lumbar radiculopathy    3. Palliative care status    4. High risk medication use        Gladys NOBLES Bladimir reports a pain score of 9.  Given her pain assessment as noted, treatment options were discussed and the following options were decided upon as a " follow-up plan to address the patient's pain: use of non-medical modalities (ice, heat, stretching and/or behavior modifications).      --- Patient presented to the office today to establish care with our office in order to continue with intrathecal pain pump therapy which is being managed by home health agency, [facility].  --- We will continue with the hydrocodone for breakthrough pain for now however we may evaluate in the future if the need to increase the patient's daily rate of medication will be necessary.  I would also recommend reducing the quantity of hydrocodone that the patient is given as she is not requiring the use of the medication.  She does not require prescription on today.    --- Plan for follow-up in the office in 3 months      Routine UDS in office today as part of monitoring requirements for controlled substances.  The specimen was viewed and the immunoassay result reviewed and is probably positive for opiates.  This specimen will be sent to [facility] laboratory for confirmation.         The patient has signed a copy of our prescribing agreement.  The has stated both verbal and written understanding that prescription pain medication may be stopped if - pain does not significantly decrease and/or function increase, there is evidence of diverting medication, if She obtained controlled substances from another licensed practitioner without my knowledge and approval, if I feel that it is in the patient's best interest, if She exhibits any aggressive behavior to any provider or staff member in this office.  The patient agrees to only use the medication exactly as prescribed, to fill controlled substances at the same pharmacy, to keep medication in the original container, and to store controlled substances in a locked cabinet or other secure storage unit. The patient agrees to notify the office immediately if medication is lost or stolen and will be asked to produce an official police report prior to  replacing/continuing lost/stolen controlled substances.  The patient understands that there will be routine monitoring including urine drug screens, pill counts, and review of pharmacy report.  The patient understands that She may be asked to submit to random drug screen or pill count. The patient will not seek early refills.  The patient will not use illegal street drugs while receiving controlled substances from this practice.  The patient understands that failure to adhere with this agreement may result in cessation of therapy with controlled substance prescribing.                  JULIA /INSPECT REPORT  As part of the patient's treatment plan, I am prescribing controlled substances. The patient has been made aware of appropriate use of such medications, including potential risk of somnolence, limited ability to drive and/or work safely, and the potential for dependence or overdose. It has also been made clear that these medications are for use by this patient only, without concomitant use of alcohol or other substances unless prescribed.     Patient has completed prescribing agreement detailing terms of continued prescribing of controlled substances, including monitoring JULIA reports, urine drug screening, and pill counts if necessary. The patient is aware that inappropriate use will results in cessation of prescribing such medications.    As the clinician, I personally reviewed the JULIA from 1/29/2025 while the patient was in the office today.    History and physical exam exhibit continued safe and appropriate use of controlled substances.     Dictated utilizing Dragon dictation.

## 2025-01-29 NOTE — TELEPHONE ENCOUNTER
Caller: Gladys Garrison    Relationship to patient: Self    Best call back number: [phone]    Patient is needing:   PATIENT  WOULD STILL LIKE TO KNOW IF THE OFFICE HAS RECEIVED THE [facility] PAPERWORK FOR HER SON AND HIS EMPLOYER AT [employer]    STATES THEY HAVE BEEN RE-FAXED ON 1.2.25    PLEASE CALL TO LET HER KNOW IT HAS BEEN RECEIVED.

## 2025-01-30 LAB
POC AMPHETAMINES: NEGATIVE
POC BARBITURATES: NEGATIVE
POC BENZODIAZEPHINES: NEGATIVE
POC COCAINE: NEGATIVE
POC METHADONE: NEGATIVE
POC METHAMPHETAMINE SCREEN URINE: NEGATIVE
POC OPIATES: POSITIVE
POC OXYCODONE: NEGATIVE
POC PHENCYCLIDINE: NEGATIVE
POC PROPOXYPHENE: NEGATIVE
POC THC: NEGATIVE

## 2025-01-31 ENCOUNTER — TELEPHONE (OUTPATIENT)
Dept: ONCOLOGY | Facility: CLINIC | Age: 65
End: 2025-01-31
Payer: MEDICARE

## 2025-02-03 ENCOUNTER — TELEPHONE (OUTPATIENT)
Dept: CARDIOLOGY | Facility: CLINIC | Age: 65
End: 2025-02-03

## 2025-02-03 NOTE — TELEPHONE ENCOUNTER
Caller: Gladys Garrison     Relationship: PATIENT    Best call back number: [phone]    What is your medical concern? PT IS CALLING TO SEE IF SHE CAN HAVE SEX? SHE SAID SHE WAS IN THE HOSPITAL WITH SEPSIS AND WAS READING THE PAPERWORK AND SAID THE INFECTION COULD BE TRANSMITTED.     SHE ALSO WANTED TO SEE IF SHE NEEDED TO HAVE HER PACEMAKER TAKEN OUT BECAUSE ONE OF THE LEADS ARE INFECTED AND SHE THINKS THE TRICUSPID VALVE IS TOO. PT WOULD LIKE A CALL BACK    How long has this issue been going on? 2 WEEKS     Is your provider already aware of this issue? NO    Have you been treated for this issue? YES

## 2025-02-06 ENCOUNTER — TELEPHONE (OUTPATIENT)
Dept: FAMILY MEDICINE CLINIC | Facility: CLINIC | Age: 65
End: 2025-02-06
Payer: MEDICARE

## 2025-02-06 NOTE — TELEPHONE ENCOUNTER
Gladys went to the ER on 1/5/25 where she was admitted to the [facility] for sepsis.  She was discharged on 1/13/25.    Her son, Brady Garrison is her primary care giver and is needing FMLA forms filled out.  He is requesting intermittent FMLA so he can take her to her follow-up appointments.  In addition the FMLA needs to cover the 3 days he missed work on 1/11/25, 1/12/25 and 1/13/25.    I placed the FMLA forms in Dr. Henry in-basket.

## 2025-02-10 ENCOUNTER — TELEPHONE (OUTPATIENT)
Dept: FAMILY MEDICINE CLINIC | Facility: CLINIC | Age: 65
End: 2025-02-10

## 2025-02-10 ENCOUNTER — TELEPHONE (OUTPATIENT)
Dept: CARDIOLOGY | Facility: CLINIC | Age: 65
End: 2025-02-10

## 2025-02-10 NOTE — TELEPHONE ENCOUNTER
Caller: Gladys Garrison    Relationship to patient: Self    Best call back number:  [phone]    Patient is needing:    PATIENT CALLED TO SCHEDULE  SCOPE - TO CHECK THE PACEMAKER LEADS TO MAKE SURE THERES NO INFECTION. LAST DOSAGE OF ANTIBOTIC WILL BE THE 18TH. DO NOT SEE ORDERS FOR THIS ?

## 2025-02-10 NOTE — TELEPHONE ENCOUNTER
Caller: Gladys Garrison    Relationship: Self    Best call back number: [phone]    PATIENT REQUESTING A CALLBACK .    SHE NEEDS TO GET A SCOPE DONE AFTER HER ANTIBIOTICS ARE FINISHED.    HER LAST DAY OF ANTIBIOTICS ARE 2/19/25 AND SHE WANTS TO GET THIS SCOPE DONE ON THIS DAY.

## 2025-02-14 ENCOUNTER — TELEPHONE (OUTPATIENT)
Dept: FAMILY MEDICINE CLINIC | Facility: CLINIC | Age: 65
End: 2025-02-14

## 2025-02-14 NOTE — TELEPHONE ENCOUNTER
Informed pt. We have received paperwork and it is in progress. Will call patient once it is completed.

## 2025-02-14 NOTE — TELEPHONE ENCOUNTER
Caller: Gladys Garrison    Relationship: Self    Best call back number: [phone]     What was the call regarding: PATIENT STATES THAT HER EMPLOYER SENT FORMS FOR SHORT TERM DISABILITY. PATIENT WOULD LIKE TO KNOW IF DR EDLAROSA COULD FILL THIS OUT FOR HER

## 2025-02-18 ENCOUNTER — OFFICE VISIT (OUTPATIENT)
Dept: CARDIOLOGY | Facility: CLINIC | Age: 65
End: 2025-02-18
Payer: MEDICARE

## 2025-02-18 VITALS
DIASTOLIC BLOOD PRESSURE: 58 MMHG | BODY MASS INDEX: 23.16 KG/M2 | SYSTOLIC BLOOD PRESSURE: 163 MMHG | RESPIRATION RATE: 18 BRPM | HEIGHT: 60 IN | OXYGEN SATURATION: 100 % | WEIGHT: 118 LBS | HEART RATE: 80 BPM

## 2025-02-18 DIAGNOSIS — I10 PRIMARY HYPERTENSION: Chronic | ICD-10-CM

## 2025-02-18 DIAGNOSIS — B95.1 BACTEREMIA DUE TO GROUP B STREPTOCOCCUS: Primary | ICD-10-CM

## 2025-02-18 DIAGNOSIS — Z95.0 PACEMAKER: ICD-10-CM

## 2025-02-18 DIAGNOSIS — R78.81 BACTEREMIA DUE TO GROUP B STREPTOCOCCUS: Primary | ICD-10-CM

## 2025-02-18 DIAGNOSIS — E78.5 HYPERLIPIDEMIA, UNSPECIFIED HYPERLIPIDEMIA TYPE: Chronic | ICD-10-CM

## 2025-02-18 DIAGNOSIS — I27.20 PULMONARY HYPERTENSION: Chronic | ICD-10-CM

## 2025-02-18 DIAGNOSIS — Q21.3 TETRALOGY OF FALLOT: Chronic | ICD-10-CM

## 2025-02-18 PROCEDURE — 1159F MED LIST DOCD IN RCRD: CPT

## 2025-02-18 PROCEDURE — 99214 OFFICE O/P EST MOD 30 MIN: CPT

## 2025-02-18 PROCEDURE — 3078F DIAST BP <80 MM HG: CPT

## 2025-02-18 PROCEDURE — 1160F RVW MEDS BY RX/DR IN RCRD: CPT

## 2025-02-18 PROCEDURE — 3077F SYST BP >= 140 MM HG: CPT

## 2025-02-18 NOTE — PROGRESS NOTES
"Cardiology Office Follow Up Visit      Primary Care Provider:  Chirag Robles DO    Reason for f/u:     Hospital follow-up      Subjective     CC:    Patient presents today after stay at [facility] last month with bacteremia    History of Present Illness       Gladys Garrison is a 64 y.o. female patient of Dr Lopez.  She presented to [facility] with nausea vomiting and weakness on 1/5/2025.  She was found to have group B streptococcus bacteremia of unknown etiology, possibly UTI.    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 She underwent device exchange with Medtronic generator December 2023 (Dr Hammond), Medtronic leads were confirmed for RV and RA leads which were placed back in 2007.  She also has metastatic breast cancer diagnosed 2017 with bone mets diagnosed in 2021, history of bilateral mastectomy.  Under therapy evaluation also pain management from oncology standpoint.   She also history of COVID 2021 she was admitted recently in February 2023 with chest pain atypical nature thought to be secondary to metastatic disease.   She has no history of obstructive CAD or coronary intervention.    Last 2D echo March 2023 revealed normal EF 55 to 60% with grade 1 diastolic dysfunction, severely reduced RV systolic function with moderately dilated RV and RA, moderate bileaflet mitral valve thickening with severe TR, mildly elevated pulmonary pressures 35-45, trivial pericardial effusion\".       ID requested GREGORY, which showed no valvular vegetation but questionable thrombus versus vegetation on 1 of PPM leads.  She was treated with 6 weeks of IV antibiotics with plan for repeat GREGORY at 6-week tomás along with repeat blood " cultures.    Today, patient states overall she is doing fairly well.  She states that she has been staying with her son and daughter-in-law in [facility] to recover since her discharge.  She states that she has become somewhat deconditioned during this time, having some shortness of breath walking into the office today from the car.  She states she has not been very active at home, family has been concerned she may overdo it.  Patient states that she is feeling overall well and is excited to return to work.  She denies chest pain, orthopnea, PND, palpitations or dizziness/near syncopal episodes. She is doing IV antibiotics through her PICC line at home, today is her last dose.      ASSESSMENT/PLAN:      Diagnoses and all orders for this visit:    1. Bacteremia due to group B Streptococcus (Primary)  -     Adult Transesophageal Echo (GREGORY) W/ Cont if Necessary Per Protocol; Future    2. Pacemaker  -     Adult Transesophageal Echo (GREGORY) W/ Cont if Necessary Per Protocol; Future    3. Tetralogy of Fallot    4. Primary hypertension    5. Hyperlipidemia, unspecified hyperlipidemia type    6. Pulmonary hypertension        Group B bacteremia-no valvular vegetation but questionable Thrombus vs. Vegetation on one of her PPM leads  complete heart block with dual-chamber pacemaker 2007,  generator change    2023, AV device Medtronic dual-chamber with Medtronic leads  history of congenital heart disease with tetralogy of Fallot with surgically repaired VSD  Hypertension  Hyperlipidemia  pulm hypertension / RV systolic dysfunction / valvular heart disease in conjunction with tetralogy with both pulmonic and tricuspid valve abnormalities    MEDICAL DECISION MAKING:    Placed for repeat GREGORY.  Did reach out to Bria TANNER regarding timing for follow-up blood cultures, last antibiotic dose today.  Patient will be contacted for scheduling of GREGORY.  For recurrent bacteremia, referral for laser lead extraction.   Patient is pacemaker dependent and would require additional lead placement versus Micra leadless pacemaker placement in the interim.    Patient had pacemaker upgrade with Dr. Klein 2023.  Requiring pacemaker  Blood pressure today slightly elevated, 163/53.  Patient states she has become deconditioned and the walking from the parking lot took a lot of effort.  Advised her to keep home blood pressure log and report back to office in 2 weeks  Lipid profiles per PCP  EF 56 to 60%, estimated RVSP January 45 to 55 mmHg, moderate tricuspid regurgitation, moderate to severe pulmonary hypertension, right ventricular cavity severely dilated, grade 2 diastolic dysfunction, bioprosthetic pulmonic valve      Repeat blood cultures per ID  Scheduling to contact patient regarding GREGORY  Further recommendations to follow after results of above  obtained        Past Medical History:   Diagnosis Date    Allergic     Bone cancer     METASTATIC BONE; stage 4    Bradycardia     SECONDARY TO ABLATION    Breast cancer 2017    mets to lymph nodes; did not do radiation    Cancer of unknown origin     Compression fx, thoracic spine, open, initial encounter     Coronary artery disease     COVID-19 09/01/2021    Diabetes mellitus     Heart disease, unspecified     Hepatitis C     RESOLVED WITH MEDICATION    Hyperlipidemia     Hypertension     Obesity (BMI 30-39.9) 02/05/2021    Rib fracture     Per patient    Sleep apnea     no machine    Tachycardia     ATRIAL    Type 2 diabetes mellitus 11/2017       Past Surgical History:   Procedure Laterality Date    BACK SURGERY      neck X 2    BREAST RECONSTRUCTION Bilateral     CARDIAC ABLATION       atrial tachycardia x 5 ablations     CARDIAC CATHETERIZATION      CARDIAC ELECTROPHYSIOLOGY PROCEDURE N/A 12/11/2023    Procedure: Pacemaker RV lead insertion with generator change out. Medtronic;  Surgeon: Steven Hammond MD;  Location: [facility] CATH INVASIVE LOCATION;  Service: Cardiovascular;   Laterality: N/A;    CARDIAC SURGERY      stent placed in aorta    CARDIAC SURGERY      6 surgeries as baby     CERVICAL FUSION ANTERIOR WITH ARTIFICIAL DISCECTOMY IMPLANTATION N/A 2020    Procedure: C4 VERTEBRECTOMY AND ANTERIOR CERIVCAL DISCECTOMY WITH FUSION OF CERVICAL THREE THROUGH FIVE WITH REMOVAL OF HARDWARE C5-C6;  Surgeon: Mark Kaba MD;  Location: [facility] MAIN OR;  Service: Neurosurgery;  Laterality: N/A;     SECTION      x2    COLONOSCOPY N/A 10/23/2020    Procedure: COLONOSCOPY WITH POLYPECTOMY X6;  Surgeon: Stanley Bourne MD;  Location: [facility] ENDOSCOPY;  Service: Gastroenterology;  Laterality: N/A;  POLYPS, INTERNAL HEMORRHOIDS    ENDOMETRIAL ABLATION      REMOVAL SCAR TISSUE UTERINE    ENDOSCOPY N/A 10/23/2020    Procedure: ESOPHAGOGASTRODUODENOSCOPY WITH BIOPSY X 1 AREA;  Surgeon: Stanley Bourne MD;  Location: [facility] ENDOSCOPY;  Service: Gastroenterology;  Laterality: N/A;  GASTRITIS, ESOPHAGITIS, HIATAL HERNIA    INSERT / REPLACE / REMOVE PACEMAKER      KNEE SURGERY      MASTECTOMY Bilateral     NECK SURGERY      PACEMAKER IMPLANTATION      PAIN PUMP INSERTION/REVISION N/A 2022    Procedure: PAIN PUMP INSERTION AND INTRATHECAL CATHETER PLACEMENT;  Surgeon: Chuck Hunter MD;  Location: [facility] MAIN OR;  Service: Pain Management;  Laterality: N/A;         Current Outpatient Medications:     Blood Glucose Monitoring Suppl (Accu-Chek Araceli) device, Use as instructed   To test blood sugar bid, Disp: 1 each, Rfl: 0    cefTRIAXone 2,000 mg in sodium chloride 0.9 % 100 mL IVPB, Infuse 2,000 mg into a venous catheter Daily for 38 doses. Indications: Bacteria in the Blood, Infection Within the Abdomen, Urinary Tract Infection, sepsis, Disp: , Rfl:     Continuous Blood Gluc  (FreeStyle Rajeev 14 Day [place]) device, 1 each Daily., Disp: 1 each, Rfl: 0    Continuous Blood Gluc Sensor (FreeStyle Rajeev 14 Day Sensor) misc, 1 each Daily., Disp: 6  "each, Rfl: 3    diphenoxylate-atropine (LOMOTIL) 2.5-0.025 MG per tablet, Take 1 tablet by mouth 4 (Four) Times a Day As Needed for Diarrhea., Disp: 20 tablet, Rfl: 0    Heparin Sodium, Porcine, (HEPARIN LOCK FLUSH IJ), Inject  as directed., Disp: , Rfl:     HYDROcodone-acetaminophen (NORCO)  MG per tablet, Take 1 tablet by mouth Every 4 (Four) Hours As Needed for Moderate Pain., Disp: 20 tablet, Rfl: 0    insulin NPH-insulin regular (humuLIN 70/30,novoLIN 70/30) (70-30) 100 UNIT/ML injection, Inject 5 Units under the skin into the appropriate area as directed Every Evening. If BS over 180, Disp: 15 mL, Rfl: 3    Insulin Pen Needle (B-D UF III MINI PEN NEEDLES) 31G X 5 MM misc, Use to inject insulin twice daily   DX: E11.9, Disp: 100 each, Rfl: 0    Insulin Syringe-Needle U-100 28G X 1/2\" 1 ML misc, 1 each 2 (Two) Times a Day., Disp: 100 each, Rfl: 6    morphine 5 mg/mL, by Intrathecal route Continuous., Disp: 20 mL, Rfl: 0    Morphine Sulfate, PF, 8 MG/ML solution, 8.5 mg by Intrathecal Infusion route Daily. Receives 8 mg through pain pump q 24 hrs, Disp: , Rfl:     Social History     Socioeconomic History    Marital status:     Number of children: 2   Tobacco Use    Smoking status: Never     Passive exposure: Never    Smokeless tobacco: Never   Vaping Use    Vaping status: Never Used   Substance and Sexual Activity    Alcohol use: Yes     Comment: drinks rarely    Drug use: Never    Sexual activity: Defer       Family History   Problem Relation Age of Onset    Heart disease Mother     Stroke Mother     Lung cancer Mother     Aneurysm Father     Diabetes Sister     No Known Problems Brother     No Known Problems Brother     Diabetes type I Half-Sister     Thyroid cancer Half-Sister     Cancer Maternal Aunt     Heart attack Sister     Thyroid disease Sister     No Known Problems Sister        The following portions of the patient's history were reviewed and updated as appropriate: allergies, current " "medications, past family history, past medical history, past social history, past surgical history and problem list.    Review of Systems   Constitutional: Positive for diaphoresis.   Cardiovascular:  Positive for dyspnea on exertion and leg swelling. Negative for chest pain, irregular heartbeat, orthopnea, palpitations and paroxysmal nocturnal dyspnea.   Hematologic/Lymphatic: Bruises/bleeds easily.   Musculoskeletal:  Positive for myalgias.   Neurological:  Positive for weakness. Negative for focal weakness, light-headedness and vertigo.       Pertinent items are noted in HPI, all other systems reviewed and negative    /58 (BP Location: Right arm, Patient Position: Sitting, Cuff Size: Large Adult)   Pulse 80   Resp 18   Ht 152 cm (59.84\")   Wt 53.5 kg (118 lb)   LMP  (LMP Unknown)   SpO2 100%   BMI 23.17 kg/m² .  Objective     Vitals reviewed.   Constitutional:       Appearance: Normal appearance. Not in distress. Not diaphoretic.   Eyes:      Extraocular Movements: Extraocular movements intact.      Conjunctiva/sclera: Conjunctivae normal.      Pupils: Pupils are equal, round, and reactive to light.   HENT:      Head: Normocephalic and atraumatic.   Neck:      Vascular: No JVD.   Pulmonary:      Effort: Pulmonary effort is normal. No tachypnea or accessory muscle usage.      Breath sounds: No stridor.      Comments: coarse  Cardiovascular:      PMI at left midclavicular line. Normal rate. Regular rhythm. Normal S1. Normal S2.       Murmurs: There is no murmur.      No gallop.  No click. No rub.   Pulses:     Intact distal pulses.   Edema:     Peripheral edema absent.   Abdominal:      General: There is no distension.      Palpations: Abdomen is soft.      Tenderness: There is no abdominal tenderness.   Skin:     General: Skin is warm and dry.      Coloration: Skin is not cyanotic.      Findings: No erythema.   Neurological:      General: No focal deficit present.      Mental Status: Alert, oriented " to person, place, and time and oriented to person, place and time.   Psychiatric:         Mood and Affect: Mood and affect normal.         Behavior: Behavior normal.         Cognition and Memory: Cognition normal.         Judgment: Judgment normal.         Radhachris MALLORIE Garrison  reports that she has never smoked. She has never been exposed to tobacco smoke. She has never used smokeless tobacco.

## 2025-02-20 ENCOUNTER — TELEPHONE (OUTPATIENT)
Dept: CARDIOLOGY | Facility: CLINIC | Age: 65
End: 2025-02-20
Payer: MEDICARE

## 2025-02-20 NOTE — TELEPHONE ENCOUNTER
Patient calling to ask if Leydi Rasmussen has been able to speak with infection disease doctors about labs that she says are required before doing an ECHO.

## 2025-02-21 ENCOUNTER — ANESTHESIA EVENT (OUTPATIENT)
Dept: CARDIOLOGY | Facility: HOSPITAL | Age: 65
End: 2025-02-21
Payer: MEDICARE

## 2025-02-21 ENCOUNTER — ANESTHESIA (OUTPATIENT)
Dept: CARDIOLOGY | Facility: HOSPITAL | Age: 65
End: 2025-02-21
Payer: MEDICARE

## 2025-02-21 ENCOUNTER — HOSPITAL ENCOUNTER (OUTPATIENT)
Dept: CARDIOLOGY | Facility: HOSPITAL | Age: 65
Setting detail: HOSPITAL OUTPATIENT SURGERY
Discharge: HOME | End: 2025-02-21
Payer: MEDICARE

## 2025-02-21 VITALS
WEIGHT: 118.83 LBS | BODY MASS INDEX: 23.33 KG/M2 | SYSTOLIC BLOOD PRESSURE: 130 MMHG | HEART RATE: 60 BPM | DIASTOLIC BLOOD PRESSURE: 71 MMHG | TEMPERATURE: 98 F | HEIGHT: 60 IN | OXYGEN SATURATION: 97 % | RESPIRATION RATE: 16 BRPM

## 2025-02-21 DIAGNOSIS — B95.1 BACTEREMIA DUE TO GROUP B STREPTOCOCCUS: ICD-10-CM

## 2025-02-21 DIAGNOSIS — R78.81 BACTEREMIA DUE TO GROUP B STREPTOCOCCUS: ICD-10-CM

## 2025-02-21 DIAGNOSIS — Z95.0 PACEMAKER: ICD-10-CM

## 2025-02-21 LAB
ANION GAP SERPL CALCULATED.3IONS-SCNC: 7.8 MMOL/L (ref 5–15)
BH CV ECHO SHUNT ASSESSMENT PERFORMED (HIDDEN SCRIPTING): 1
BUN SERPL-MCNC: 11 MG/DL (ref 8–23)
BUN/CREAT SERPL: 19 (ref 7–25)
CALCIUM SPEC-SCNC: 9.1 MG/DL (ref 8.6–10.5)
CHLORIDE SERPL-SCNC: 103 MMOL/L (ref 98–107)
CO2 SERPL-SCNC: 25.2 MMOL/L (ref 22–29)
CREAT SERPL-MCNC: 0.58 MG/DL (ref 0.57–1)
EGFRCR SERPLBLD CKD-EPI 2021: 102.5 ML/MIN/1.73
GLUCOSE SERPL-MCNC: 96 MG/DL (ref 65–99)
POTASSIUM SERPL-SCNC: 4.2 MMOL/L (ref 3.5–5.2)
SODIUM SERPL-SCNC: 136 MMOL/L (ref 136–145)

## 2025-02-21 PROCEDURE — 25810000003 SODIUM CHLORIDE 0.9 % SOLUTION: Performed by: NURSE ANESTHETIST, CERTIFIED REGISTERED

## 2025-02-21 PROCEDURE — 93325 DOPPLER ECHO COLOR FLOW MAPG: CPT

## 2025-02-21 PROCEDURE — 93320 DOPPLER ECHO COMPLETE: CPT

## 2025-02-21 PROCEDURE — 80048 BASIC METABOLIC PNL TOTAL CA: CPT | Performed by: STUDENT IN AN ORGANIZED HEALTH CARE EDUCATION/TRAINING PROGRAM

## 2025-02-21 PROCEDURE — 25010000002 LIDOCAINE PF 2% 2 % SOLUTION: Performed by: NURSE ANESTHETIST, CERTIFIED REGISTERED

## 2025-02-21 PROCEDURE — 93312 ECHO TRANSESOPHAGEAL: CPT

## 2025-02-21 PROCEDURE — 25010000002 PROPOFOL 10 MG/ML EMULSION: Performed by: NURSE ANESTHETIST, CERTIFIED REGISTERED

## 2025-02-21 RX ORDER — SODIUM CHLORIDE 0.9 % (FLUSH) 0.9 %
10 SYRINGE (ML) INJECTION EVERY 12 HOURS SCHEDULED
Status: DISCONTINUED | OUTPATIENT
Start: 2025-02-21 | End: 2025-02-22 | Stop reason: HOSPADM

## 2025-02-21 RX ORDER — SODIUM CHLORIDE 9 MG/ML
INJECTION, SOLUTION INTRAVENOUS CONTINUOUS PRN
Status: DISCONTINUED | OUTPATIENT
Start: 2025-02-21 | End: 2025-02-21 | Stop reason: SURG

## 2025-02-21 RX ORDER — PROPOFOL 10 MG/ML
VIAL (ML) INTRAVENOUS AS NEEDED
Status: DISCONTINUED | OUTPATIENT
Start: 2025-02-21 | End: 2025-02-21 | Stop reason: SURG

## 2025-02-21 RX ORDER — SODIUM CHLORIDE 0.9 % (FLUSH) 0.9 %
20 SYRINGE (ML) INJECTION AS NEEDED
Status: DISCONTINUED | OUTPATIENT
Start: 2025-02-21 | End: 2025-02-22 | Stop reason: HOSPADM

## 2025-02-21 RX ORDER — SODIUM CHLORIDE 0.9 % (FLUSH) 0.9 %
10 SYRINGE (ML) INJECTION AS NEEDED
Status: DISCONTINUED | OUTPATIENT
Start: 2025-02-21 | End: 2025-02-22 | Stop reason: HOSPADM

## 2025-02-21 RX ORDER — LIDOCAINE HYDROCHLORIDE 20 MG/ML
INJECTION, SOLUTION EPIDURAL; INFILTRATION; INTRACAUDAL; PERINEURAL AS NEEDED
Status: DISCONTINUED | OUTPATIENT
Start: 2025-02-21 | End: 2025-02-21 | Stop reason: SURG

## 2025-02-21 RX ORDER — SODIUM CHLORIDE 9 MG/ML
40 INJECTION, SOLUTION INTRAVENOUS AS NEEDED
Status: DISCONTINUED | OUTPATIENT
Start: 2025-02-21 | End: 2025-02-22 | Stop reason: HOSPADM

## 2025-02-21 RX ADMIN — PROPOFOL 20 MG: 10 INJECTION, EMULSION INTRAVENOUS at 13:58

## 2025-02-21 RX ADMIN — SODIUM CHLORIDE: 9 INJECTION, SOLUTION INTRAVENOUS at 13:46

## 2025-02-21 RX ADMIN — PROPOFOL 20 MG: 10 INJECTION, EMULSION INTRAVENOUS at 13:55

## 2025-02-21 RX ADMIN — PROPOFOL 70 MG: 10 INJECTION, EMULSION INTRAVENOUS at 13:52

## 2025-02-21 RX ADMIN — LIDOCAINE HYDROCHLORIDE 60 MG: 20 INJECTION, SOLUTION EPIDURAL; INFILTRATION; INTRACAUDAL; PERINEURAL at 13:51

## 2025-02-21 RX ADMIN — Medication 10 ML: at 13:15

## 2025-02-21 RX ADMIN — Medication 20 ML: at 15:31

## 2025-02-21 NOTE — ANESTHESIA POSTPROCEDURE EVALUATION
Patient: Gladys Garrison    Procedure Summary       Date: 02/21/25 Room / Location: [facility]    Anesthesia Start: 1346 Anesthesia Stop: 1403    Procedure: ADULT TRANSESOPHAGEAL ECHO (GREGORY) W/ CONT IF NECESSARY PER PROTOCOL Diagnosis:       Pacemaker      Bacteremia due to group B Streptococcus      (Endocarditis)      (Positive Blood Cultures)    Scheduled Providers: Clarence Juarez MD; Milan Leblanc MD Provider: Milan Leblanc MD    Anesthesia Type: MAC ASA Status: 3            Anesthesia Type: MAC    Vitals  Vitals Value Taken Time   /71 02/21/25 1517   Temp     Pulse 60 02/21/25 1527   Resp 16 02/21/25 1500   SpO2 97 % 02/21/25 1527   Vitals shown include unfiled device data.        Post Anesthesia Care and Evaluation    Patient location during evaluation: PACU  Patient participation: complete - patient participated  Level of consciousness: awake  Pain scale: See nurse's notes for pain score.  Pain management: adequate    Airway patency: patent  Anesthetic complications: No anesthetic complications  PONV Status: none  Cardiovascular status: acceptable  Respiratory status: acceptable and spontaneous ventilation  Hydration status: acceptable    Comments: Patient seen and examined postoperatively; vital signs stable; SpO2 greater than or equal to 90%; cardiopulmonary status stable; nausea/vomiting adequately controlled; pain adequately controlled; no apparent anesthesia complications; patient discharged from anesthesia care when discharge criteria were met

## 2025-02-21 NOTE — DISCHARGE INSTRUCTIONS
GREGORY DC Instructions  The medication, which was used to put you to sleep, will be acting in your body for the next twenty-four (24) hours, so you might feel a little sleepy; this feeling will slowly wear off.  Because the medicine is still in your system for the next twenty-four (24) hours:  Do not drive a car, operate machinery, or power tools  Do not drink any alcoholic drinks (not even beer)  Make any important decisions such as to sign important papers    We strongly suggest that a responsible adult be with the patient the rest of the day.    What to do for pain: acetaminophen (Tylenol) and eating cool, soothing foods (e.g. ice cream, smoothies) can help with a sore throat. If your pain is unmanageable with these methods, call the Cardiologist's office.     It may be better to start with liquids such as water, then soups, and graduate up to solid foods  If medication was used to numb your throat, do not eat or drink anything for 2 hours.     Call the cardiologist with any problems of:  Excessive mucus  Spitting up blood  Sore throat more than 72 hours    Go to the nearest Emergency Department if you're vomiting blood or having difficulty breathing.

## 2025-02-21 NOTE — ANESTHESIA PREPROCEDURE EVALUATION
Anesthesia Evaluation     Patient summary reviewed and Nursing notes reviewed   NPO Solid Status: > 8 hours  NPO Liquid Status: > 8 hours           Airway   Mallampati: II  Dental    (+) poor dentition        Pulmonary    (+) ,shortness of breath, sleep apnea  Cardiovascular - normal exam    ECG reviewed    (+) hypertension, CAD, dysrhythmias Atrial Fib, CHF , hyperlipidemia      Neuro/Psych  (+) headaches, dizziness/light headedness, syncope, numbness  GI/Hepatic/Renal/Endo    (+) hepatitis C, liver disease, renal disease-    Musculoskeletal     Abdominal    Substance History      OB/GYN          Other   arthritis,   history of cancer                Anesthesia Plan    ASA 3     MAC     intravenous induction     Anesthetic plan, risks, benefits, and alternatives have been provided, discussed and informed consent has been obtained with: patient.    Plan discussed with CRNA.    CODE STATUS:

## 2025-02-21 NOTE — TELEPHONE ENCOUNTER
Telma Rasmussen attempted to call patient but their conversation was cutoff by bad connection.     Infectious disease doctor does not need lab work.    I attempted to call again, no answer, no voicemail.

## 2025-02-24 ENCOUNTER — HOSPITAL ENCOUNTER (OUTPATIENT)
Dept: ONCOLOGY | Facility: HOSPITAL | Age: 65
Discharge: HOME | End: 2025-02-24
Admitting: INTERNAL MEDICINE
Payer: MEDICARE

## 2025-02-24 ENCOUNTER — OFFICE VISIT (OUTPATIENT)
Dept: ONCOLOGY | Facility: CLINIC | Age: 65
End: 2025-02-24
Payer: MEDICARE

## 2025-02-24 VITALS
BODY MASS INDEX: 23.79 KG/M2 | DIASTOLIC BLOOD PRESSURE: 74 MMHG | WEIGHT: 118 LBS | RESPIRATION RATE: 18 BRPM | OXYGEN SATURATION: 99 % | HEIGHT: 59 IN | SYSTOLIC BLOOD PRESSURE: 163 MMHG | TEMPERATURE: 97.4 F | HEART RATE: 96 BPM

## 2025-02-24 DIAGNOSIS — C50.919 MALIGNANT NEOPLASM OF FEMALE BREAST, UNSPECIFIED ESTROGEN RECEPTOR STATUS, UNSPECIFIED LATERALITY, UNSPECIFIED SITE OF BREAST: Primary | ICD-10-CM

## 2025-02-24 DIAGNOSIS — C50.919 MALIGNANT NEOPLASM OF FEMALE BREAST, UNSPECIFIED ESTROGEN RECEPTOR STATUS, UNSPECIFIED LATERALITY, UNSPECIFIED SITE OF BREAST: ICD-10-CM

## 2025-02-24 LAB
ALBUMIN SERPL-MCNC: 3.6 G/DL (ref 3.5–5.2)
ALBUMIN/GLOB SERPL: 1 G/DL
ALP SERPL-CCNC: 63 U/L (ref 39–117)
ALT SERPL W P-5'-P-CCNC: 8 U/L (ref 1–33)
ANION GAP SERPL CALCULATED.3IONS-SCNC: 9.4 MMOL/L (ref 5–15)
AST SERPL-CCNC: 25 U/L (ref 1–32)
BASOPHILS # BLD AUTO: 0 10*3/MM3 (ref 0–0.2)
BASOPHILS NFR BLD AUTO: 0 % (ref 0–1.5)
BILIRUB SERPL-MCNC: 0.4 MG/DL (ref 0–1.2)
BUN SERPL-MCNC: 13 MG/DL (ref 8–23)
BUN/CREAT SERPL: 17.8 (ref 7–25)
CALCIUM SPEC-SCNC: 8.8 MG/DL (ref 8.6–10.5)
CHLORIDE SERPL-SCNC: 103 MMOL/L (ref 98–107)
CO2 SERPL-SCNC: 24.6 MMOL/L (ref 22–29)
CREAT SERPL-MCNC: 0.73 MG/DL (ref 0.57–1)
DEPRECATED RDW RBC AUTO: 45.7 FL (ref 37–54)
EGFRCR SERPLBLD CKD-EPI 2021: 93.1 ML/MIN/1.73
EOSINOPHIL # BLD AUTO: 0.02 10*3/MM3 (ref 0–0.4)
EOSINOPHIL NFR BLD AUTO: 0.5 % (ref 0.3–6.2)
ERYTHROCYTE [DISTWIDTH] IN BLOOD BY AUTOMATED COUNT: 13.9 % (ref 12.3–15.4)
GLOBULIN UR ELPH-MCNC: 3.6 GM/DL
GLUCOSE SERPL-MCNC: 136 MG/DL (ref 65–99)
HCT VFR BLD AUTO: 29.4 % (ref 34–46.6)
HGB BLD-MCNC: 8.9 G/DL (ref 12–15.9)
LYMPHOCYTES # BLD AUTO: 0.54 10*3/MM3 (ref 0.7–3.1)
LYMPHOCYTES NFR BLD AUTO: 13 % (ref 19.6–45.3)
MCH RBC QN AUTO: 28.7 PG (ref 26.6–33)
MCHC RBC AUTO-ENTMCNC: 30.3 G/DL (ref 31.5–35.7)
MCV RBC AUTO: 94.8 FL (ref 79–97)
MONOCYTES # BLD AUTO: 0.29 10*3/MM3 (ref 0.1–0.9)
MONOCYTES NFR BLD AUTO: 7 % (ref 5–12)
NEUTROPHILS NFR BLD AUTO: 3.29 10*3/MM3 (ref 1.7–7)
NEUTROPHILS NFR BLD AUTO: 79.5 % (ref 42.7–76)
PLATELET # BLD AUTO: 90 10*3/MM3 (ref 140–450)
PMV BLD AUTO: 9.7 FL (ref 6–12)
POTASSIUM SERPL-SCNC: 4.4 MMOL/L (ref 3.5–5.2)
PROT SERPL-MCNC: 7.2 G/DL (ref 6–8.5)
RBC # BLD AUTO: 3.1 10*6/MM3 (ref 3.77–5.28)
SODIUM SERPL-SCNC: 137 MMOL/L (ref 136–145)
WBC NRBC COR # BLD AUTO: 4.14 10*3/MM3 (ref 3.4–10.8)

## 2025-02-24 PROCEDURE — 3077F SYST BP >= 140 MM HG: CPT | Performed by: INTERNAL MEDICINE

## 2025-02-24 PROCEDURE — 1125F AMNT PAIN NOTED PAIN PRSNT: CPT | Performed by: INTERNAL MEDICINE

## 2025-02-24 PROCEDURE — 99214 OFFICE O/P EST MOD 30 MIN: CPT | Performed by: INTERNAL MEDICINE

## 2025-02-24 PROCEDURE — 80053 COMPREHEN METABOLIC PANEL: CPT | Performed by: INTERNAL MEDICINE

## 2025-02-24 PROCEDURE — 3078F DIAST BP <80 MM HG: CPT | Performed by: INTERNAL MEDICINE

## 2025-02-24 PROCEDURE — 85025 COMPLETE CBC W/AUTO DIFF WBC: CPT | Performed by: INTERNAL MEDICINE

## 2025-02-24 PROCEDURE — 36415 COLL VENOUS BLD VENIPUNCTURE: CPT | Performed by: INTERNAL MEDICINE

## 2025-02-24 PROCEDURE — 36592 COLLECT BLOOD FROM PICC: CPT

## 2025-02-24 NOTE — PROGRESS NOTES
Hematology/Oncology Outpatient Follow Up    PATIENT NAME:Gladys Garrison  :1960  MRN: [id number]  PRIMARY CARE PHYSICIAN: Chirag Robles DO  REFERRING PHYSICIAN: Chirag Robles, *      Chief Complaint   Patient presents with    Follow-up     Malignant neoplasm of female breast, unspecified estrogen receptor status, unspecified laterality, unspecified site of breast            HISTORY OF PRESENT ILLNESS:     This is a 63-year-old female who multiple comorbidities including congenital abnormalities such as hypoplastic kidney, tetralogy of Fallot, coarctation of the aorta and congenital VSD status post repair.  Patient developed syncopal episode and for that reason she had she had a CT scan of the chest which showed mass in the left breast.  She had diagnostic mammogram and ultrasound which showed a 2 cm spiculated mass in the left breast at the 1 o'clock position 6 cm from the nipple.  Biopsy of the left breast mass revealed invasive moderately differentiated carcinoma ER/FL positive and HER-2/bishop negative.  Patient also had an ultrasound of the axilla which was concerning for an abnormal left axillary lymph node with cortical thickening. She apparently had an FNA of the left axillary nodule which was positive for malignancy.  On 2017 patient underwent left modified radical mastectomy, right prophylactic mastectomy and immediate breast reconstruction. She also underwent right prophylactic mastectomy.  We have had her on records suggest that patient did have multifocal disease with pT2 pN1 aM0.  The largest focus measured 3.5 cm.  2 of 11 lymph nodes were positive for metastatic disease some with extracapsular extension.  Notes that patient did receive Arimidex neoadjuvant  from 2017 to 2018 prior to her bilateral mastectomies.    Review of her note suggest that she developed cough which she attributed to anastrozole and patient was then placed on tamoxifen.  She had MammaPrint  testing which returned with low risk for relapse.    Her postop course was also complicated by left chest wall abscess which resulted in I&D and removal of the left tissue expander. She was referred for radiation treatment boards ultimately declined.    Patient was then placed on tamoxifen in April 2018 which she stopped after less than a month due to nausea and vomiting.  Patient in the interim also was diagnosed with chronic hepatitis C was seen by the hepatologist.  Tamoxifen was dose reduced to 10 mg daily with the goal to increase to 20 mg daily.      Patient has relocated to [facility].  She has transitioned her care to us now.  She is currently not on any antiestrogen therapy.     Her bilateral mastectomy specimen are available for review    1/29/2021 patient had bone density which showed osteoporosis  Patient was seen by neurosurgery and had a bone scan done in March 2021 which showed subtle activity in the inferior L4 vertebral body appears to correlate with new area of sclerosis on CT of the abdomen and pelvis.  There is also subtle activity with possible new lesion at the posterior left sacrum.  These findings were concerning for metastatic disease.  PET CT scan was recommended to further evaluate.  4/8/2021 patient had a PET CT scan which showed evidence of disease in the neck chest abdomen and pelvis.  But she has a 1.1 cm mixed lytic/sclerotic hypermetabolic lesion within the left hemisacrum consistent with metastatic disease.  There is also accumulation within the inferior endplate of the L4 vertebral body thought to correspond to 1.2 centimeters sclerotic lesion consistent with metastatic disease.  There is a tiny hypermetabolic focus within the L3, T11.  Suspicious for also early metastatic disease.  4/26/2021 patient underwent CT-guided biopsy of sacral lesion, pathology was consistent with metastatic breast cancer ER and MN positive HER-2/bishop was negative.  7/6/21 CT new sclerosis within the  right 12th rib posteriorly which could represent metastatic lesion or a healed or healing fracture, new sclerosis within the right 12th rib posteriorly which         could represent metastatic lesion,new sclerosis within the right T8 transverse process worrisome   for metastatic        disease progression.  7/7/21 complaints of 8/10 back pain and 8/10 LUQ pain. Referral to radiation for questionable mets on CT chest.  7/26/2021 patient had CT scan of the head with contrast with did not show any evidence of metastatic disease.  CT scan of the chest abdomen and pelvis did not show any evidence of progressive disease.  7/26/2021 patient had CEA level which shows declining values CA 15-3 has decreased to 62 from 84  11/3/2021: Patient had CT scan of the chest, abdomen and pelvis. In the chest there were no suspicious pulmonary nodules. There is no clear evidence of progressive disease  12/13/2021 patient had a bone scan which showed uptake along the posterior right ribs consistent with rib fractures.  There is mild uptake in the L4 vertebral body corresponding to the sclerotic lesion seen on previous CT scan.  This was likely due to metastatic disease  10/6/2022: Patient has been lost to follow-up.  She stopped Ibrance due to pulmonary issues.  Apparently patient went to the emergency room and was told that Ibrance was causing lung damage.  October 6, 2022: She has a increasing CA 15-3 and CA 27.29 as well as CEA level.  10/19/2022: Patient had guardant 360 testing done which was negative  10/31/2022: Patient had bone scan which basically showed evidence for mild progression of multifocal osseous metastatic disease.  There appears to be new activity corresponding to the thoracic cervical spine, left scapula, left sacrum and left proximal femur.  CT scan of the chest otherwise is stable.  There is a nonobstructing stone on the left kidney.        Past Medical History:   Diagnosis Date    Allergic     Bone cancer      METASTATIC BONE; stage 4    Bradycardia     SECONDARY TO ABLATION    Breast cancer     mets to lymph nodes; did not do radiation    Cancer of unknown origin     Compression fx, thoracic spine, open, initial encounter     Coronary artery disease     COVID-19 2021    Diabetes mellitus     Heart defect, congenital 1960    tratology of flow (spelling?)    Heart murmur     Hepatitis C     RESOLVED WITH MEDICATION    Hyperlipidemia     Hypertension     Leaky heart valve     tricuspid & mitral valves leak; not yet enough to warrent another open heart surgery    Obesity (BMI 30-39.9) 2021    Pacemaker     dependent, because of ablations to treat tachycardia    Rib fracture     Per patient    Sepsis 2025    Sleep apnea     no machine    Tachycardia     ATRIAL    Type 2 diabetes mellitus 2017       Past Surgical History:   Procedure Laterality Date    BACK SURGERY      neck X 2    BREAST RECONSTRUCTION Bilateral     CARDIAC ABLATION       atrial tachycardia x 5 ablations     CARDIAC CATHETERIZATION      CARDIAC ELECTROPHYSIOLOGY PROCEDURE N/A 2023    Procedure: Pacemaker RV lead insertion with generator change out. rVuetronic;  Surgeon: Stveen Hammond MD;  Location: [facility] CATH INVASIVE LOCATION;  Service: Cardiovascular;  Laterality: N/A;    CARDIAC SURGERY      stent placed in aorta    CARDIAC SURGERY      6 surgeries as baby     CERVICAL FUSION ANTERIOR WITH ARTIFICIAL DISCECTOMY IMPLANTATION N/A 2020    Procedure: C4 VERTEBRECTOMY AND ANTERIOR CERIVCAL DISCECTOMY WITH FUSION OF CERVICAL THREE THROUGH FIVE WITH REMOVAL OF HARDWARE C5-C6;  Surgeon: Mark Kaba MD;  Location: [facility] MAIN OR;  Service: Neurosurgery;  Laterality: N/A;     SECTION      x2    COLONOSCOPY N/A 10/23/2020    Procedure: COLONOSCOPY WITH POLYPECTOMY X6;  Surgeon: Stanley Bourne MD;  Location: [facility] ENDOSCOPY;  Service: Gastroenterology;  Laterality: N/A;  POLYPS, INTERNAL  "HEMORRHOIDS    ENDOMETRIAL ABLATION      REMOVAL SCAR TISSUE UTERINE    ENDOSCOPY N/A 10/23/2020    Procedure: ESOPHAGOGASTRODUODENOSCOPY WITH BIOPSY X 1 AREA;  Surgeon: Stanley Bourne MD;  Location: [facility] ENDOSCOPY;  Service: Gastroenterology;  Laterality: N/A;  GASTRITIS, ESOPHAGITIS, HIATAL HERNIA    INSERT / REPLACE / REMOVE PACEMAKER      KNEE SURGERY  2000    MASTECTOMY Bilateral     NECK SURGERY      PACEMAKER IMPLANTATION      [employer]    PAIN PUMP INSERTION/REVISION N/A 04/05/2022    Procedure: PAIN PUMP INSERTION AND INTRATHECAL CATHETER PLACEMENT;  Surgeon: Chuck Hunter MD;  Location: [facility] MAIN OR;  Service: Pain Management;  Laterality: N/A;         Current Outpatient Medications:     Blood Glucose Monitoring Suppl (Accu-Chek Araceli) device, Use as instructed   To test blood sugar bid, Disp: 1 each, Rfl: 0    Continuous Blood Gluc  (FreeStyle Rajeve 14 Day Scott) device, 1 each Daily., Disp: 1 each, Rfl: 0    Continuous Blood Gluc Sensor (FreeStyle Rajeev 14 Day Sensor) misc, 1 each Daily., Disp: 6 each, Rfl: 3    Heparin Sodium, Porcine, (HEPARIN LOCK FLUSH IJ), Inject 5 mL as directed As Needed (heparin lock PICC line after use)., Disp: , Rfl:     HYDROcodone-acetaminophen (NORCO)  MG per tablet, Take 1 tablet by mouth Every 4 (Four) Hours As Needed for Moderate Pain., Disp: 20 tablet, Rfl: 0    insulin NPH-insulin regular (humuLIN 70/30,novoLIN 70/30) (70-30) 100 UNIT/ML injection, Inject 5 Units under the skin into the appropriate area as directed Every Evening. If BS over 180, Disp: 15 mL, Rfl: 3    Insulin Pen Needle (B-D UF III MINI PEN NEEDLES) 31G X 5 MM misc, Use to inject insulin twice daily   DX: E11.9, Disp: 100 each, Rfl: 0    Insulin Syringe-Needle U-100 28G X 1/2\" 1 ML misc, 1 each 2 (Two) Times a Day., Disp: 100 each, Rfl: 6    morphine 5 mg/mL, by Intrathecal route Continuous., Disp: 20 mL, Rfl: 0    Morphine Sulfate, PF, 8 MG/ML solution, 1 mg by " Intrathecal Infusion route Daily. Receives 1 mg through pain pump q 24 hrs, Disp: , Rfl:     Allergies   Allergen Reactions    Promethazine Other (See Comments)     Hyperactive mean    Tape Other (See Comments)     .blisters      Adhesive Tape Rash    Flexeril [Cyclobenzaprine] Rash       Family History   Problem Relation Age of Onset    Heart disease Mother     Stroke Mother     Lung cancer Mother     Aneurysm Father     Diabetes Sister     No Known Problems Brother     No Known Problems Brother     Diabetes type I Half-Sister     Thyroid cancer Half-Sister     Cancer Maternal Aunt     Heart attack Sister     Thyroid disease Sister     No Known Problems Sister        Cancer-related family history includes Cancer in her maternal aunt; Lung cancer in her mother; Thyroid cancer in her half-sister.    Social History     Tobacco Use    Smoking status: Never     Passive exposure: Never    Smokeless tobacco: Never   Vaping Use    Vaping status: Never Used   Substance Use Topics    Alcohol use: Yes     Comment: drinks rarely    Drug use: Never       I have reviewed and confirmed the accuracy of the patient's history: Chief complaint, HPI, ROS, and Subjective as entered by the MA/LPN/RN. Марияciera Nunez MD 02/24/25 2/24/25      SUBJECTIVE:      Patient denies any new issues.  Holding abnormality seen and Aromasin did not really change her symptoms.  She now has more pain medications    Patient is here today posthospital admission.  She had a repeat GREGORY and a follow-up appointment coming up with her cardiologist.  She has not started her anticancer treatments.  She just completed IV antibiotics approximately 6 days ago.      Gladys MALLORIE Garrison reports a pain score of 9.  Given her pain assessment as noted, treatment options were discussed and the following options were decided upon as a follow-up plan to address the patient's pain: continuation of current treatment plan for pain and referral to specialist for  "assistance in pain treatment guidance.       REVIEW OF SYSTEMS:     Review of Systems   Constitutional:  Positive for fatigue. Negative for chills and fever.   HENT:  Negative for ear pain, mouth sores, nosebleeds and sore throat.    Eyes:  Negative for photophobia and visual disturbance.   Respiratory:  Negative for wheezing and stridor.    Cardiovascular:  Negative for chest pain and palpitations.   Gastrointestinal:  Negative for abdominal pain, diarrhea, nausea and vomiting.   Endocrine: Negative for cold intolerance and heat intolerance.   Genitourinary:  Negative for dysuria and hematuria.   Musculoskeletal:  Negative for joint swelling and neck stiffness.   Skin:  Negative for color change and rash.   Neurological:  Negative for seizures and syncope.   Hematological:  Negative for adenopathy.        No obvious bleeding   Psychiatric/Behavioral:  Negative for agitation, confusion and hallucinations.          OBJECTIVE:    Vitals:    02/24/25 1516   BP: 163/74   Pulse: 96   Resp: 18   Temp: 97.4 °F (36.3 °C)   TempSrc: Infrared   SpO2: 99%   Weight: 53.5 kg (118 lb)   Height: 149.9 cm (59\")   PainSc: 9    PainLoc: Generalized                         Body mass index is 23.83 kg/m².    ECOG    (1) Restricted in physically strenuous activity, ambulatory and able to do work of light nature    Physical Exam  Vitals and nursing note reviewed.   Constitutional:       General: She is not in acute distress.     Appearance: Normal appearance. She is not diaphoretic.   HENT:      Head: Normocephalic and atraumatic.      Mouth/Throat:      Mouth: Mucous membranes are moist.      Pharynx: Oropharynx is clear.   Eyes:      General: No scleral icterus.        Right eye: No discharge.         Left eye: No discharge.      Extraocular Movements: Extraocular movements intact.      Conjunctiva/sclera: Conjunctivae normal.   Neck:      Thyroid: No thyromegaly.   Cardiovascular:      Rate and Rhythm: Normal rate and regular rhythm. "      Pulses: Normal pulses.      Heart sounds: Normal heart sounds.      No friction rub. No gallop.   Pulmonary:      Effort: Pulmonary effort is normal. No respiratory distress.      Breath sounds: Normal breath sounds. No stridor. No wheezing.   Abdominal:      General: Bowel sounds are normal. There is distension.      Palpations: Abdomen is soft. There is no mass.      Tenderness: There is abdominal tenderness. There is guarding (Left upper abdomen). There is no rebound.   Musculoskeletal:         General: No tenderness. Normal range of motion.      Cervical back: Normal range of motion and neck supple.      Right lower leg: No edema.      Left lower leg: No edema.      Comments: Neck pain.  Patient has a neck collar.   Lymphadenopathy:      Cervical: No cervical adenopathy.   Skin:     General: Skin is warm and dry.      Capillary Refill: Capillary refill takes less than 2 seconds.      Findings: No bruising, erythema or rash.   Neurological:      Mental Status: She is alert and oriented to person, place, and time.      Cranial Nerves: No cranial nerve deficit.      Sensory: No sensory deficit.      Motor: No abnormal muscle tone.   Psychiatric:         Mood and Affect: Mood normal.         Behavior: Behavior normal.         Thought Content: Thought content normal.         Judgment: Judgment normal.     I have reexamined the patient and the results are consistent with the previously documented exam. Мария Nunez MD      RECENT LABS    WBC   Date Value Ref Range Status   02/24/2025 4.14 3.40 - 10.80 10*3/mm3 Final   02/17/2025 2.96 (L) 3.70 - 10.30 10*3/uL Final     RBC   Date Value Ref Range Status   02/24/2025 3.10 (L) 3.77 - 5.28 10*6/mm3 Final   02/17/2025 3 (L) 3.90 - 5.20 10*6/uL Final     Hemoglobin   Date Value Ref Range Status   02/24/2025 8.9 (L) 12.0 - 15.9 g/dL Final   02/17/2025 8.5 (L) 11.2 - 15.7 g/dL Final     Hematocrit   Date Value Ref Range Status   02/24/2025 29.4 (L) 34.0 - 46.6 %  Final   02/17/2025 27.3 (L) 34.0 - 45.0 % Final     MCV   Date Value Ref Range Status   02/24/2025 94.8 79.0 - 97.0 fL Final   02/17/2025 91 79 - 98 fL Final     MCH   Date Value Ref Range Status   02/24/2025 28.7 26.6 - 33.0 pg Final   02/17/2025 28.3 26.0 - 32.0 pg Final     MCHC   Date Value Ref Range Status   02/24/2025 30.3 (L) 31.5 - 35.7 g/dL Final   02/17/2025 31.1 30.7 - 35.5 g/dL Final     RDW   Date Value Ref Range Status   02/24/2025 13.9 12.3 - 15.4 % Final   02/17/2025 13.6 11.5 - 14.5 % Final     RDW-SD   Date Value Ref Range Status   02/24/2025 45.7 37.0 - 54.0 fl Final     MPV   Date Value Ref Range Status   02/24/2025 9.7 6.0 - 12.0 fL Final   02/17/2025 10.1 8.8 - 12.5 fL Final     Platelets   Date Value Ref Range Status   02/24/2025 90 (L) 140 - 450 10*3/mm3 Final   02/17/2025 89 (L) 155 - 369 10*3/uL Final     Neutrophil Rel %   Date Value Ref Range Status   02/10/2025 82 % Final     Neutrophil %   Date Value Ref Range Status   02/24/2025 79.5 (H) 42.7 - 76.0 % Final     Lymphocyte Rel %   Date Value Ref Range Status   02/10/2025 9 % Final     Lymphocyte %   Date Value Ref Range Status   02/24/2025 13.0 (L) 19.6 - 45.3 % Final     Monocyte Rel %   Date Value Ref Range Status   02/10/2025 7 % Final     Monocyte %   Date Value Ref Range Status   02/24/2025 7.0 5.0 - 12.0 % Final     Eosinophil %   Date Value Ref Range Status   02/24/2025 0.5 0.3 - 6.2 % Final   02/10/2025 1 % Final     Basophil Rel %   Date Value Ref Range Status   02/10/2025 0 % Final     Basophil %   Date Value Ref Range Status   02/24/2025 0.0 0.0 - 1.5 % Final     Immature Grans %   Date Value Ref Range Status   02/10/2025 1 % Final     Neutrophils Absolute   Date Value Ref Range Status   02/10/2025 3.77 1.60 - 6.10 10*3/uL Final     Neutrophils, Absolute   Date Value Ref Range Status   02/24/2025 3.29 1.70 - 7.00 10*3/mm3 Final     Lymphocytes Absolute   Date Value Ref Range Status   02/10/2025 0.39 (L) 1.20 - 3.90 10*3/uL  Final     Lymphocytes, Absolute   Date Value Ref Range Status   02/24/2025 0.54 (L) 0.70 - 3.10 10*3/mm3 Final     Monocytes Absolute   Date Value Ref Range Status   02/10/2025 0.32 0.30 - 0.90 10*3/uL Final     Monocytes, Absolute   Date Value Ref Range Status   02/24/2025 0.29 0.10 - 0.90 10*3/mm3 Final     Eosinophils Absolute   Date Value Ref Range Status   02/10/2025 0.03 0.00 - 0.50 10*3/uL Final     Eosinophils, Absolute   Date Value Ref Range Status   02/24/2025 0.02 0.00 - 0.40 10*3/mm3 Final     Basophils Absolute   Date Value Ref Range Status   02/10/2025 0.01 0.00 - 0.10 10*3/uL Final     Basophils, Absolute   Date Value Ref Range Status   02/24/2025 0.00 0.00 - 0.20 10*3/mm3 Final     Immature Grans, Absolute   Date Value Ref Range Status   02/10/2025 0.03 0.00 - 0.06 10*3/uL Final     nRBC   Date Value Ref Range Status   02/17/2025 0 <=0.0 per 100 WBCs Final   01/10/2025 0.0 0.0 - 0.2 /100 WBC Final       Lab Results   Component Value Date    GLUCOSE 136 (H) 02/24/2025    BUN 13 02/24/2025    CREATININE 0.73 02/24/2025    EGFRIFNONA 70 12/20/2021    EGFRIFAFRI >60 10/12/2018    BCR 17.8 02/24/2025    K 4.4 02/24/2025    CO2 24.6 02/24/2025    CALCIUM 8.8 02/24/2025    ALBUMIN 3.6 02/24/2025    AST 25 02/24/2025    ALT 8 02/24/2025       ASSESSMENT:    Metastatic breast cancer presenting as 1.1 cm mixed lytic/sclerotic hypermetabolic lesion in the left hemisacrum suspicious for metastatic disease 1.2 cm sclerotic lesion on L4, small L3 lesion and T11 lesion.  Tumor is ER positive, DC positive and HER-2/bishop negative.  Patient was prescribed combination of Ibrance and Arimidex.  Continued Ibrance due to pulmonary toxicity.  She was then placed on single agent Arimidex.  Upon progression on Arimidex was switched to Faslodex.  She developed L1 vertebral body progressive disease on Faslodex for that reason we will discontinue Faslodex and begin combination of Aromasin and Afinitor.  Patient took Afinitor for  3 days and discontinued due to nausea and fatigue.  [id number] does not show any actionable mutation.  She does not have any soft tissue for us to biopsy for additional NGS testing.  Ongoing management  She is currently on Aromasin and abemaciclib stable.  Holding her meds did not change her symptoms.  Therefore we will go ahead and restart at this time.  Her CT scans completed August 2024 shows overall stable disease with no evidence of progression.  I have encouraged her to have the plain films of the right knee that was recommended by radiologist.  Reviewed  Abemaciclib induced diarrhea: Intermittent Lomotil  Pancytopenia secondary to abemaciclib: Patient has now agreed to proceed with dose reduction  Fatigue secondary to abemaciclib: This is stable  Thrombocytopenia due to abemaciclib.  CBC reviewed.  Platelets up to 80,000  Bone metastasis:.  Biphosphonate's has been recommended patient has not been able to have due to ongoing dental issues  Right foot swelling: Doppler study was negative  History of medical noncompliance  L1 vertebral body involvement.  Patient is established with neurosurgical team  Pain management: She will follow-up with pain management team  ypT2 N1 aM0 status post left modified radical mastectomy with lymph node dissection and right prophylactic mastectomy in 2017 performed at [facility].  ER positive, WA positive and HER-2/bishop negative.  Status post bilateral chest wall reconstruction.  According to patient, she tolerated Arimidex very well except that she also had osteoporosis therefore Arimidex was discontinued at that time  Intolerance of tamoxifen in the past  Osteoporosis: We will transition to Xgeva since she has bone metastases  Status post neoadjuvant Arimidex prior to bilateral mastectomies  Thrombocytopenia: Work-up was - December 2020  Neck pain status post cervical spine surgery: Resolved  Complex cardiac medical history including tetralogy of Fallot,  coarctation of aorta, VSD status post repair.  Status post stent placement for coarctation of aorta  Personal history and strong family history of breast cancer in multiple in the relatives on paternal side of the family including 4 paternal aunts in their 30s and 40s and 2 maternal aunts at age of 20s and 50s.  There is concern for possible hereditary breast cancer syndrome.  Patient may be  interested in pursuing cancer genetics for her management.  Assessment has been reviewed and updated          Discussion    Patient has metastatic breast cancer estrogen and progesterone dependent and HER-2/bishop negative.  She is currently on Arimidex.  We will add Ibrance.  We will also discontinue Prolia and begin Xgeva to help reduce skeletal events.           Plans:     Restart Aromasin and abemaciclib at reduced doses  Follow up on her counts  Follow up with cardiology  Continue supportive care antinausea medications  Reviewed imaging results with patient  Pancytopenia is due to Verzenio: Platelets up to 90,000  MTM pharmacist follow-up with her next week  Checked tumor markers for CEA CA 15-3 and CA 27.29 reviewed  She has completed  palliative XRT to her lumbar spine  Follow-up with Dr. Ruff with neurosurgical services  Guardant 360 for NGS testing was negative for any actionable mutations  Follow-up with pain management with Dr. Hunter  Guardian 360 does not show any actionable mutation.  Would consider soft tissue biopsy at time of progression for additional NGS testing.  Reviewed with patient  Referred to pulmonary for her dyspnea: Dr. Julio.  Appointment is pending  Follow-up with pain management for ongoing pain issues  Follow-up with /counselor   Reviewed work-up for thrombocytopenia which was negative  Reviewed her bone density which showed osteoporosis  Schedule Xgeva 120 mg subcu monthly once her dental procedures are completed.  She is still waiting on a ida  All questions answered  Follow up  in 4 weeks  Labs reviewed            Patient verbalized understanding and is in agreement of the above plan.           Electronically signed by Мария Nunez MD, 02/24/25, 10:43 PM EST.

## 2025-02-24 NOTE — PROGRESS NOTES
Pt to injection chair for lab draw from PICC line. 10cc blood wasted and labs collected as ordered. PICC flushed with 20cc normal saline and alcohol cap applied. Pt states that she is supposed to be hearing soon about getting PICC line removed because course of antibiotics are complete. Pt to waiting room.

## 2025-02-25 ENCOUNTER — HOSPITAL ENCOUNTER (OUTPATIENT)
Dept: INFUSION THERAPY | Facility: HOSPITAL | Age: 65
Discharge: HOME | End: 2025-02-25
Admitting: NURSE PRACTITIONER
Payer: MEDICARE

## 2025-02-25 VITALS
TEMPERATURE: 97.7 F | HEART RATE: 82 BPM | OXYGEN SATURATION: 97 % | SYSTOLIC BLOOD PRESSURE: 179 MMHG | DIASTOLIC BLOOD PRESSURE: 94 MMHG | RESPIRATION RATE: 18 BRPM

## 2025-02-25 DIAGNOSIS — T82.7XXA PACEMAKER INFECTION, INITIAL ENCOUNTER: Primary | ICD-10-CM

## 2025-02-25 DIAGNOSIS — R78.81 BACTEREMIA: Primary | ICD-10-CM

## 2025-02-25 DIAGNOSIS — R78.81 BACTEREMIA: ICD-10-CM

## 2025-02-25 LAB
BASOPHILS # BLD AUTO: 0.01 10*3/MM3 (ref 0–0.2)
BASOPHILS NFR BLD AUTO: 0.3 % (ref 0–1.5)
CREAT SERPL-MCNC: 0.67 MG/DL (ref 0.57–1)
DEPRECATED RDW RBC AUTO: 47 FL (ref 37–54)
EGFRCR SERPLBLD CKD-EPI 2021: 99 ML/MIN/1.73
EOSINOPHIL # BLD AUTO: 0.04 10*3/MM3 (ref 0–0.4)
EOSINOPHIL NFR BLD AUTO: 1.1 % (ref 0.3–6.2)
ERYTHROCYTE [DISTWIDTH] IN BLOOD BY AUTOMATED COUNT: 14 % (ref 12.3–15.4)
HCT VFR BLD AUTO: 29 % (ref 34–46.6)
HGB BLD-MCNC: 8.7 G/DL (ref 12–15.9)
IMM GRANULOCYTES # BLD AUTO: 0.02 10*3/MM3 (ref 0–0.05)
IMM GRANULOCYTES NFR BLD AUTO: 0.5 % (ref 0–0.5)
LYMPHOCYTES # BLD AUTO: 0.48 10*3/MM3 (ref 0.7–3.1)
LYMPHOCYTES NFR BLD AUTO: 13 % (ref 19.6–45.3)
MCH RBC QN AUTO: 27.9 PG (ref 26.6–33)
MCHC RBC AUTO-ENTMCNC: 30 G/DL (ref 31.5–35.7)
MCV RBC AUTO: 92.9 FL (ref 79–97)
MONOCYTES # BLD AUTO: 0.25 10*3/MM3 (ref 0.1–0.9)
MONOCYTES NFR BLD AUTO: 6.8 % (ref 5–12)
NEUTROPHILS NFR BLD AUTO: 2.89 10*3/MM3 (ref 1.7–7)
NEUTROPHILS NFR BLD AUTO: 78.3 % (ref 42.7–76)
NRBC BLD AUTO-RTO: 0 /100 WBC (ref 0–0.2)
PLATELET # BLD AUTO: 98 10*3/MM3 (ref 140–450)
PMV BLD AUTO: 10.5 FL (ref 6–12)
RBC # BLD AUTO: 3.12 10*6/MM3 (ref 3.77–5.28)
WBC NRBC COR # BLD AUTO: 3.69 10*3/MM3 (ref 3.4–10.8)

## 2025-02-25 PROCEDURE — 36592 COLLECT BLOOD FROM PICC: CPT

## 2025-02-25 PROCEDURE — 85025 COMPLETE CBC W/AUTO DIFF WBC: CPT | Performed by: NURSE PRACTITIONER

## 2025-02-25 PROCEDURE — 82565 ASSAY OF CREATININE: CPT | Performed by: NURSE PRACTITIONER

## 2025-02-26 ENCOUNTER — SPECIALTY PHARMACY (OUTPATIENT)
Dept: PHARMACY | Facility: HOSPITAL | Age: 65
End: 2025-02-26
Payer: MEDICARE

## 2025-02-26 NOTE — PROGRESS NOTES
Specialty Pharmacy Note: Verzenio (abemaciclib)    Gladys Garrison is a 64 y.o. female with metastatic breast cancer was seen 2/24/25 by Dr. Nunez. Per provider dictation, pt to restart specialty medication, Verzenio (abemaciclib). Dr. Nunez had we requested that we ensure pt has reduced dose of medication. PCC contacted  dispensing pharmacy and confirmed Rx for Verzenio 100 mg BID is at pharmacy. Last dispensed on 1/9/25 and pt okay to refill. [employer] Message sent to pt with pharmacy contact number to schedule delivery for refill.  Labs Review: The CMP and CBC from 2/24/25 have been reviewed. No dose adjustments are needed for the oral specialty medication(s) based on the labs.        Thank you,  Verona Cowart, Pharm.D.

## 2025-02-28 ENCOUNTER — TELEPHONE (OUTPATIENT)
Dept: PAIN MEDICINE | Facility: CLINIC | Age: 65
End: 2025-02-28
Payer: MEDICARE

## 2025-02-28 NOTE — TELEPHONE ENCOUNTER
LVM FOR PATIENT TO CALL BACK AND GET SCHEDULED FOR PAIN PUMP REFILL BEFORE 4/25/25, PLEASE TRANSFER TO PAIN MANAGEMENT

## 2025-03-04 ENCOUNTER — TELEPHONE (OUTPATIENT)
Dept: PAIN MEDICINE | Facility: CLINIC | Age: 65
End: 2025-03-04
Payer: MEDICARE

## 2025-03-04 NOTE — TELEPHONE ENCOUNTER
Lvm for patient to call back ASAP and get reschedule for pain pump refill on 4/28/25 with SUDARSAHN Scanlon. Please reschedule pain pump refill for around the same timeframe with SUDARSHAN Scanlon

## 2025-03-07 ENCOUNTER — NURSE TRIAGE (OUTPATIENT)
Dept: CALL CENTER | Facility: HOSPITAL | Age: 65
End: 2025-03-07
Payer: MEDICARE

## 2025-03-07 ENCOUNTER — LAB (OUTPATIENT)
Dept: FAMILY MEDICINE CLINIC | Facility: CLINIC | Age: 65
End: 2025-03-07
Payer: MEDICARE

## 2025-03-07 ENCOUNTER — LAB (OUTPATIENT)
Dept: LAB | Facility: HOSPITAL | Age: 65
End: 2025-03-07
Payer: MEDICARE

## 2025-03-07 ENCOUNTER — OFFICE VISIT (OUTPATIENT)
Dept: FAMILY MEDICINE CLINIC | Facility: CLINIC | Age: 65
End: 2025-03-07
Payer: MEDICARE

## 2025-03-07 VITALS
HEIGHT: 59 IN | DIASTOLIC BLOOD PRESSURE: 80 MMHG | SYSTOLIC BLOOD PRESSURE: 118 MMHG | HEART RATE: 82 BPM | OXYGEN SATURATION: 99 % | RESPIRATION RATE: 18 BRPM | WEIGHT: 124.4 LBS | BODY MASS INDEX: 25.08 KG/M2

## 2025-03-07 DIAGNOSIS — Z86.79 HISTORY OF ENDOCARDITIS: ICD-10-CM

## 2025-03-07 DIAGNOSIS — R60.0 LOWER EXTREMITY EDEMA: Primary | ICD-10-CM

## 2025-03-07 DIAGNOSIS — R60.0 LOWER EXTREMITY EDEMA: ICD-10-CM

## 2025-03-07 DIAGNOSIS — R06.00 DYSPNEA, UNSPECIFIED TYPE: ICD-10-CM

## 2025-03-07 DIAGNOSIS — I10 ESSENTIAL (PRIMARY) HYPERTENSION: ICD-10-CM

## 2025-03-07 LAB
ALBUMIN SERPL-MCNC: 4.2 G/DL (ref 3.5–5.2)
ALBUMIN/GLOB SERPL: 1.1 G/DL
ALP SERPL-CCNC: 82 U/L (ref 39–117)
ALT SERPL W P-5'-P-CCNC: 9 U/L (ref 1–33)
ANION GAP SERPL CALCULATED.3IONS-SCNC: 11.8 MMOL/L (ref 5–15)
AST SERPL-CCNC: 28 U/L (ref 1–32)
BASOPHILS # BLD AUTO: 0 10*3/MM3 (ref 0–0.2)
BASOPHILS NFR BLD AUTO: 0 % (ref 0–1.5)
BILIRUB SERPL-MCNC: 0.6 MG/DL (ref 0–1.2)
BUN SERPL-MCNC: 16 MG/DL (ref 8–23)
BUN/CREAT SERPL: 15.5 (ref 7–25)
CALCIUM SPEC-SCNC: 9.3 MG/DL (ref 8.6–10.5)
CHLORIDE SERPL-SCNC: 102 MMOL/L (ref 98–107)
CO2 SERPL-SCNC: 24.2 MMOL/L (ref 22–29)
CREAT SERPL-MCNC: 1.03 MG/DL (ref 0.57–1)
DEPRECATED RDW RBC AUTO: 50.6 FL (ref 37–54)
EGFRCR SERPLBLD CKD-EPI 2021: 61.6 ML/MIN/1.73
EOSINOPHIL # BLD AUTO: 0.02 10*3/MM3 (ref 0–0.4)
EOSINOPHIL NFR BLD AUTO: 0.7 % (ref 0.3–6.2)
ERYTHROCYTE [DISTWIDTH] IN BLOOD BY AUTOMATED COUNT: 14.7 % (ref 12.3–15.4)
GLOBULIN UR ELPH-MCNC: 3.7 GM/DL
GLUCOSE SERPL-MCNC: 114 MG/DL (ref 65–99)
HCT VFR BLD AUTO: 32.9 % (ref 34–46.6)
HGB BLD-MCNC: 9.6 G/DL (ref 12–15.9)
IMM GRANULOCYTES # BLD AUTO: 0.01 10*3/MM3 (ref 0–0.05)
IMM GRANULOCYTES NFR BLD AUTO: 0.3 % (ref 0–0.5)
LYMPHOCYTES # BLD AUTO: 0.37 10*3/MM3 (ref 0.7–3.1)
LYMPHOCYTES NFR BLD AUTO: 12.1 % (ref 19.6–45.3)
MCH RBC QN AUTO: 27.5 PG (ref 26.6–33)
MCHC RBC AUTO-ENTMCNC: 29.2 G/DL (ref 31.5–35.7)
MCV RBC AUTO: 94.3 FL (ref 79–97)
MONOCYTES # BLD AUTO: 0.15 10*3/MM3 (ref 0.1–0.9)
MONOCYTES NFR BLD AUTO: 4.9 % (ref 5–12)
NEUTROPHILS NFR BLD AUTO: 2.5 10*3/MM3 (ref 1.7–7)
NEUTROPHILS NFR BLD AUTO: 82 % (ref 42.7–76)
NRBC BLD AUTO-RTO: 0 /100 WBC (ref 0–0.2)
NT-PROBNP SERPL-MCNC: 3086 PG/ML (ref 0–900)
PLATELET # BLD AUTO: 85 10*3/MM3 (ref 140–450)
PMV BLD AUTO: 10.7 FL (ref 6–12)
POTASSIUM SERPL-SCNC: 4.3 MMOL/L (ref 3.5–5.2)
PROT SERPL-MCNC: 7.9 G/DL (ref 6–8.5)
RBC # BLD AUTO: 3.49 10*6/MM3 (ref 3.77–5.28)
SODIUM SERPL-SCNC: 138 MMOL/L (ref 136–145)
TSH SERPL DL<=0.05 MIU/L-ACNC: 1.95 UIU/ML (ref 0.27–4.2)
WBC NRBC COR # BLD AUTO: 3.05 10*3/MM3 (ref 3.4–10.8)

## 2025-03-07 PROCEDURE — 99214 OFFICE O/P EST MOD 30 MIN: CPT | Performed by: INTERNAL MEDICINE

## 2025-03-07 PROCEDURE — 85025 COMPLETE CBC W/AUTO DIFF WBC: CPT | Performed by: INTERNAL MEDICINE

## 2025-03-07 PROCEDURE — 3074F SYST BP LT 130 MM HG: CPT | Performed by: INTERNAL MEDICINE

## 2025-03-07 PROCEDURE — 87040 BLOOD CULTURE FOR BACTERIA: CPT

## 2025-03-07 PROCEDURE — 83880 ASSAY OF NATRIURETIC PEPTIDE: CPT

## 2025-03-07 PROCEDURE — 80053 COMPREHEN METABOLIC PANEL: CPT

## 2025-03-07 PROCEDURE — 1125F AMNT PAIN NOTED PAIN PRSNT: CPT | Performed by: INTERNAL MEDICINE

## 2025-03-07 PROCEDURE — 3079F DIAST BP 80-89 MM HG: CPT | Performed by: INTERNAL MEDICINE

## 2025-03-07 PROCEDURE — 36415 COLL VENOUS BLD VENIPUNCTURE: CPT

## 2025-03-07 PROCEDURE — 84443 ASSAY THYROID STIM HORMONE: CPT

## 2025-03-07 NOTE — PROGRESS NOTES
Chief Complaint  Foot Swelling, Leg Swelling, and Shortness of Breath    HPI:    Gladys Garrison presents to [facility]  Patient is a 64-year-old female with a history of hypertension, hyperlipidemia, sick sinus syndrome status post pacemaker placement, complete heart block, CAD, tetralogy of Fallot, type 2 diabetes, breast cancer presenting for evaluation of swelling.     Patient reports that she has been having increased lower extremity swelling and shortness of breath, especially with exertion over the past several days.  Denies chest pain, orthopnea, PND, and lower extremity edema Denies palpitations, tachycardia, dizziness, lightheadedness, and syncope. She is reportedly compliant with medications. Not currently on diuretic, although was previously. Denies fever, chills, nausea, and vomiting. She noticed that she has not been having a lot of urine output over the last several days. Denies dysuria, urgency, frequency, hematuria, cloudy/foul smelling urine, or flank pain. Patient with a history of bacteremia and endocarditis secondary to group B strep.  Treated with 6 weeks of IV antibiotics.  Most recent echocardiogram 2/21/2025 with EF of 51 to 55% and moderate to severe tricuspid regurgitation.  No obvious source of vegetation noted on valves or the lead.  Most recently seen by cardiology 2/18/2025 and endorsed some shortness of breath on exertion and leg swelling. She was cleared for work and had PICC line removed after GREGORY. History of breast cancer with metastasis. Not currenty on Verzenio. Weight up about 6-8 pounds over the last several days.     Review of Systems:  ROS negative unless otherwise noted in HPI above.    Past Medical History:   Diagnosis Date    Allergic     Bone cancer     METASTATIC BONE; stage 4    Bradycardia     SECONDARY TO ABLATION    Breast cancer 2017    mets to lymph nodes; did not do radiation    Cancer of unknown origin     Compression fx,  "thoracic spine, open, initial encounter     Coronary artery disease     COVID-19 09/01/2021    Diabetes mellitus     Heart defect, congenital 1960    tratology of flow (spelling?)    Heart murmur     Hepatitis C     RESOLVED WITH MEDICATION    Hyperlipidemia     Hypertension     Leaky heart valve     tricuspid & mitral valves leak; not yet enough to warrent another open heart surgery    Obesity (BMI 30-39.9) 02/05/2021    Pacemaker     dependent, because of ablations to treat tachycardia    Rib fracture     Per patient    Sepsis 01/2025    Sleep apnea     no machine    Tachycardia     ATRIAL    Type 2 diabetes mellitus 11/2017         Current Outpatient Medications:     Blood Glucose Monitoring Suppl (Accu-Chek Araceli) device, Use as instructed   To test blood sugar bid, Disp: 1 each, Rfl: 0    Continuous Blood Gluc  (FreeStyle Rajeev 14 Day [place]) device, 1 each Daily., Disp: 1 each, Rfl: 0    Continuous Blood Gluc Sensor (FreeStyle Rajeev 14 Day Sensor) misc, 1 each Daily., Disp: 6 each, Rfl: 3    HYDROcodone-acetaminophen (NORCO)  MG per tablet, Take 1 tablet by mouth Every 4 (Four) Hours As Needed for Moderate Pain., Disp: 20 tablet, Rfl: 0    insulin NPH-insulin regular (humuLIN 70/30,novoLIN 70/30) (70-30) 100 UNIT/ML injection, Inject 5 Units under the skin into the appropriate area as directed Every Evening. If BS over 180, Disp: 15 mL, Rfl: 3    Insulin Pen Needle (B-D UF III MINI PEN NEEDLES) 31G X 5 MM misc, Use to inject insulin twice daily   DX: E11.9, Disp: 100 each, Rfl: 0    Insulin Syringe-Needle U-100 28G X 1/2\" 1 ML misc, 1 each 2 (Two) Times a Day., Disp: 100 each, Rfl: 6    Morphine Sulfate, PF, 8 MG/ML solution, 1 mg by Intrathecal Infusion route Daily. Receives 1 mg through pain pump q 24 hrs, Disp: , Rfl:     Heparin Sodium, Porcine, (HEPARIN LOCK FLUSH IJ), Inject 5 mL as directed As Needed (heparin lock PICC line after use). (Patient not taking: Reported on 3/7/2025), " "Disp: , Rfl:     morphine 5 mg/mL, by Intrathecal route Continuous. (Patient not taking: Reported on 3/7/2025), Disp: 20 mL, Rfl: 0    Social History     Socioeconomic History    Marital status:     Number of children: 2   Tobacco Use    Smoking status: Never     Passive exposure: Never    Smokeless tobacco: Never   Vaping Use    Vaping status: Never Used   Substance and Sexual Activity    Alcohol use: Yes     Comment: drinks rarely    Drug use: Never    Sexual activity: Defer        Objective   Vital Signs:  /80   Pulse 82   Resp 18   Ht 149.9 cm (59\")   Wt 56.4 kg (124 lb 6.4 oz)   SpO2 99%   BMI 25.13 kg/m²   Estimated body mass index is 25.13 kg/m² as calculated from the following:    Height as of this encounter: 149.9 cm (59\").    Weight as of this encounter: 56.4 kg (124 lb 6.4 oz).    Physical Exam:  General: Tired appearing patient, no apparent distress  Cardiac: Regular rate and rhythm, normal S1/S2, no murmur, rubs or gallops, 2+ pitting edema bilaterally from mid shin distally  Lungs: Decreased breath sounds in the bases bilaterally, no crackles or wheezes  Abdomen: Soft, non-tender, no guarding or rebound tenderness, no hepatosplenomegaly  Skin: No significant rashes or lesions  MSK: Grossly normal tone and strength  Neuro: Alert and oriented x3, CN II-XII grossly intact  Psych: Appropriate mood and affect    Assessment and Plan:    (R60.0) Lower extremity edema   (R06.00) Dyspnea, unspecified type  (Z86.79) History of endocarditis -  Assessment: Patient with complicated past medical history including metastatic breast cancer, recent endocarditis treated with IV antibiotics, sepsis presenting for approximately 3 to 4 days of increased bilateral lower extremity swelling, shortness of breath, and significant weight gain.  Exam notable for significant bilateral lower extremity edema and decreased breath sounds in the bases bilaterally, otherwise unremarkable.  Expressed concern for more " urgent workup given patient's complex medical history and uncertainty heading into the weekend.  Patient declines EGD and wants to proceed with lab understanding likely will not come back until next week and knowing the potential risks of not receiving immediate care.  Patient continues to decline the ED and will follow-up only if new or worsening symptoms  Plan:  - Comprehensive metabolic panel, CBC & Differential, TSH Rfx On Abnormal To Free T4, proBNP  - Culture - Blood,, Blood Culture - Blood,  - Continue medications as prescribed for now  - Plans pending labs and symptoms  - Follow-up with ED if new or worsening symptoms      Patient was given instructions and counseling regarding her condition or for health maintenance advice. Please see specific information pulled into the AVS if appropriate.       Dr Blaise Arroyo   Internal Medicine Physician  [facility]  [address], [address]  [place] [address]

## 2025-03-07 NOTE — PATIENT INSTRUCTIONS
Please stop at lab on second floor to have blood drawn    Medications:  Continue medications as prescribed    Future plans pending labs    Follow up with ED if new/worsening symptoms

## 2025-03-07 NOTE — TELEPHONE ENCOUNTER
Foot swelling, shortness of breath with minimal exertion. Reviewed protocol with patient. Connected patient with Kylie at Dr. Robles's office to schedule appointment.    Reason for Disposition   [1] MILD swelling of both ankles (i.e., pedal edema) AND [2] new-onset or worsening    Additional Information   Negative: SEVERE difficulty breathing (e.g., struggling for each breath, speaks in single words)   Negative: Looks like a broken bone or dislocated joint (e.g., crooked or deformed)   Negative: Sounds like a life-threatening emergency to the triager   Negative: Chest pain   Negative: Followed a leg injury   Negative: [1] Followed an insect bite AND [2] localized swelling (e.g., small area of puffy or swollen skin)   Negative: Swelling of one ankle joint   Negative: Swelling of knee is main symptom   Negative: Pregnant   Negative: Postpartum (from 0 to 6 weeks after delivery)   Negative: [1] Heart failure suspected (e.g., ankle swelling, shortness of breath, weight gain) AND [2] recently in hospital for heart failure   Negative: Difficulty breathing at rest   Negative: Entire foot is cool or blue in comparison to other side   Negative: [1] Can't walk or can barely walk AND [2] new-onset   Negative: [1] Difficulty breathing with exertion (e.g., walking) AND [2] new-onset or worsening   Negative: [1] Red area or streak AND [2] fever   Negative: [1] Swelling is painful to touch AND [2] fever   Negative: [1] Cast on leg or ankle AND [2] now increased pain   Negative: Patient sounds very sick or weak to the triager   Negative: SEVERE leg swelling (e.g., swelling extends above knee, entire leg is swollen, weeping fluid)   Negative: [1] Red area or streak [2] large (> 2 in. or 5 cm)   Negative: [1] Thigh or calf pain AND [2] only 1 side AND [3] present > 1 hour   Negative: [1] Thigh, calf, or ankle swelling AND [2] only 1 side   Negative: [1] Thigh, calf, or ankle swelling AND [2] bilateral AND [3] 1 side is more  "swollen   Negative: [1] MODERATE leg swelling (e.g., swelling extends up to knees) AND [2] new-onset or worsening   Negative: Swelling of face, arm or hands  (Exception: Slight puffiness of fingers occurring during hot weather.)   Negative: Looks like a boil, infected sore, deep ulcer or other infected rash (spreading redness, pus)    Answer Assessment - Initial Assessment Questions  1. ONSET: \"When did the swelling start?\" (e.g., minutes, hours, days)      Since hospitalization  2. LOCATION: \"What part of the leg is swollen?\"  \"Are both legs swollen or just one leg?\"      Feet and ankles  3. SEVERITY: \"How bad is the swelling?\" (e.g., localized; mild, moderate, severe)    - Localized: Small area of swelling localized to one leg.    - MILD pedal edema: Swelling limited to foot and ankle, pitting edema < 1/4 inch (6 mm) deep, rest and elevation eliminate most or all swelling.    - MODERATE edema: Swelling of lower leg to knee, pitting edema > 1/4 inch (6 mm) deep, rest and elevation only partially reduce swelling.    - SEVERE edema: Swelling extends above knee, facial or hand swelling present.       Mild-to-moderate  4. REDNESS: \"Does the swelling look red or infected?\"      No   5. PAIN: \"Is the swelling painful to touch?\" If Yes, ask: \"How painful is it?\"   (Scale 1-10; mild, moderate or severe)      No pain, some pressure  6. FEVER: \"Do you have a fever?\" If Yes, ask: \"What is it, how was it measured, and when did it start?\"       No   7. CAUSE: \"What do you think is causing the leg swelling?\"      Unknown   8. MEDICAL HISTORY: \"Do you have a history of blood clots (e.g., DVT), cancer, heart failure, kidney disease, or liver failure?\"      See chart  9. RECURRENT SYMPTOM: \"Have you had leg swelling before?\" If Yes, ask: \"When was the last time?\" \"What happened that time?\"      Yes, treated with diuretic  10. OTHER SYMPTOMS: \"Do you have any other symptoms?\" (e.g., chest pain, difficulty breathing)        Shortness " "of breath with exertion  11. PREGNANCY: \"Is there any chance you are pregnant?\" \"When was your last menstrual period?\"        No    Protocols used: Leg Swelling and Edema-ADULT-AH    "

## 2025-03-10 ENCOUNTER — TELEPHONE (OUTPATIENT)
Dept: FAMILY MEDICINE CLINIC | Facility: CLINIC | Age: 65
End: 2025-03-10
Payer: MEDICARE

## 2025-03-10 ENCOUNTER — RESULTS FOLLOW-UP (OUTPATIENT)
Dept: FAMILY MEDICINE CLINIC | Facility: CLINIC | Age: 65
End: 2025-03-10
Payer: MEDICARE

## 2025-03-10 ENCOUNTER — TELEPHONE (OUTPATIENT)
Dept: FAMILY MEDICINE CLINIC | Facility: CLINIC | Age: 65
End: 2025-03-10

## 2025-03-10 NOTE — TELEPHONE ENCOUNTER
Caller: Lucien Garrison    Relationship: Self    Best call back number: [phone]     What is the best time to reach you: ANY    Who are you requesting to speak with (clinical staff, provider,  specific staff member): CLINICAL    Do you know the name of the person who called: LUCIEN    What was the call regarding: FMLA PAPERWORK NEED FOR HER SON'S EMPLOYMENT. PLEASE CALL TO DISCUSS

## 2025-03-10 NOTE — TELEPHONE ENCOUNTER
Caller: Gladys Garrison    Relationship to patient: Self    Best call back number: [phone]      Patient is needing:     PATIENT IS REQUESTING A CALL BACK REGARDING HER LAB RESULTS THAT WERE DONE ON 3.7.25.     SHE IS CONCERNED AND DOESN'T UNDERSTAND FULLY WHAT THE RESULTS MEAN.     NEEDING ELABORATION ON THESE RESULTS.    PLEASE ADVISE.

## 2025-03-11 RX ORDER — FUROSEMIDE 20 MG/1
20 TABLET ORAL DAILY
Qty: 30 TABLET | Refills: 0 | Status: ON HOLD | OUTPATIENT
Start: 2025-03-11 | End: 2025-04-10

## 2025-03-11 NOTE — TELEPHONE ENCOUNTER
Spoke with patient at 11:45am and gave her Hilary's message. She said she did receive the copy in [employer]. She said she might call us back with an email address to send it to if she can find one.

## 2025-03-11 NOTE — TELEPHONE ENCOUNTER
Patient called back this morning to get a status of the [facility] paperwork. When I didn't have an answer she got very upset/angry. She said we have had this for 6 weeks and if they don't get it today then her son will be fired from his job. I asked if she wanted to speak with our practice manager and patient said she really would. So, I transferred her to [facility].

## 2025-03-11 NOTE — TELEPHONE ENCOUNTER
I attempted a call to Gladys but she did not answer.  FMLA forms were completed by Dr. Robles on 2/10/25.    The forms were faxed to [place] [employer] at [phone] on 2/11/25 at 9:58 AM.  Today I sent Gladys a copy of the FMLA forms through [employer] and placed a copy at check-in if it is easier for them to .

## 2025-03-12 LAB
BACTERIA SPEC AEROBE CULT: NORMAL
BACTERIA SPEC AEROBE CULT: NORMAL

## 2025-03-12 NOTE — TELEPHONE ENCOUNTER
HUB to share. LM to return call. Her labs, specifically the BNP, show her heart is being stressed and not able to work efficiently which is likely leading to her fluid overload, shortness of breath and weight gain. Specifically this can be a sign of heart failure which is why Dr. Arroyo is recommending evaluation in the ER, especially if she is still short of breath.

## 2025-03-16 ENCOUNTER — HOSPITAL ENCOUNTER (INPATIENT)
Facility: HOSPITAL | Age: 65
LOS: 2 days | Discharge: HOME | End: 2025-03-18
Attending: EMERGENCY MEDICINE | Admitting: STUDENT IN AN ORGANIZED HEALTH CARE EDUCATION/TRAINING PROGRAM
Payer: MEDICARE

## 2025-03-16 ENCOUNTER — APPOINTMENT (OUTPATIENT)
Dept: GENERAL RADIOLOGY | Facility: HOSPITAL | Age: 65
End: 2025-03-16
Payer: MEDICARE

## 2025-03-16 DIAGNOSIS — R79.89 ELEVATED BRAIN NATRIURETIC PEPTIDE (BNP) LEVEL: ICD-10-CM

## 2025-03-16 DIAGNOSIS — R06.00 DYSPNEA, UNSPECIFIED TYPE: Primary | ICD-10-CM

## 2025-03-16 DIAGNOSIS — R60.0 BILATERAL LOWER EXTREMITY EDEMA: ICD-10-CM

## 2025-03-16 LAB
ANION GAP SERPL CALCULATED.3IONS-SCNC: 7.3 MMOL/L (ref 5–15)
BACTERIA UR QL AUTO: NORMAL /HPF
BASOPHILS # BLD AUTO: 0.01 10*3/MM3 (ref 0–0.2)
BASOPHILS NFR BLD AUTO: 0.3 % (ref 0–1.5)
BILIRUB UR QL STRIP: NEGATIVE
BUN SERPL-MCNC: 13 MG/DL (ref 8–23)
BUN/CREAT SERPL: 17.1 (ref 7–25)
CALCIUM SPEC-SCNC: 9.2 MG/DL (ref 8.6–10.5)
CHLORIDE SERPL-SCNC: 103 MMOL/L (ref 98–107)
CLARITY UR: CLEAR
CO2 SERPL-SCNC: 24.7 MMOL/L (ref 22–29)
COLOR UR: YELLOW
CREAT SERPL-MCNC: 0.76 MG/DL (ref 0.57–1)
DEPRECATED RDW RBC AUTO: 53.1 FL (ref 37–54)
EGFRCR SERPLBLD CKD-EPI 2021: 88.7 ML/MIN/1.73
EOSINOPHIL # BLD AUTO: 0.04 10*3/MM3 (ref 0–0.4)
EOSINOPHIL NFR BLD AUTO: 1.3 % (ref 0.3–6.2)
ERYTHROCYTE [DISTWIDTH] IN BLOOD BY AUTOMATED COUNT: 15.4 % (ref 12.3–15.4)
GEN 5 1HR TROPONIN T REFLEX: 22 NG/L
GLUCOSE BLDC GLUCOMTR-MCNC: 127 MG/DL (ref 70–105)
GLUCOSE BLDC GLUCOMTR-MCNC: 165 MG/DL (ref 70–105)
GLUCOSE SERPL-MCNC: 88 MG/DL (ref 65–99)
GLUCOSE UR STRIP-MCNC: NEGATIVE MG/DL
HCT VFR BLD AUTO: 29.9 % (ref 34–46.6)
HGB BLD-MCNC: 8.8 G/DL (ref 12–15.9)
HGB UR QL STRIP.AUTO: NEGATIVE
HOLD SPECIMEN: NORMAL
HYALINE CASTS UR QL AUTO: NORMAL /LPF
IMM GRANULOCYTES # BLD AUTO: 0.01 10*3/MM3 (ref 0–0.05)
IMM GRANULOCYTES NFR BLD AUTO: 0.3 % (ref 0–0.5)
KETONES UR QL STRIP: NEGATIVE
LEUKOCYTE ESTERASE UR QL STRIP.AUTO: ABNORMAL
LYMPHOCYTES # BLD AUTO: 0.39 10*3/MM3 (ref 0.7–3.1)
LYMPHOCYTES NFR BLD AUTO: 12.2 % (ref 19.6–45.3)
MAGNESIUM SERPL-MCNC: 2.2 MG/DL (ref 1.6–2.4)
MCH RBC QN AUTO: 27.5 PG (ref 26.6–33)
MCHC RBC AUTO-ENTMCNC: 29.4 G/DL (ref 31.5–35.7)
MCV RBC AUTO: 93.4 FL (ref 79–97)
MONOCYTES # BLD AUTO: 0.16 10*3/MM3 (ref 0.1–0.9)
MONOCYTES NFR BLD AUTO: 5 % (ref 5–12)
NEUTROPHILS NFR BLD AUTO: 2.58 10*3/MM3 (ref 1.7–7)
NEUTROPHILS NFR BLD AUTO: 80.9 % (ref 42.7–76)
NITRITE UR QL STRIP: NEGATIVE
NRBC BLD AUTO-RTO: 0 /100 WBC (ref 0–0.2)
NT-PROBNP SERPL-MCNC: 2523 PG/ML (ref 0–900)
PH UR STRIP.AUTO: 7 [PH] (ref 5–8)
PLATELET # BLD AUTO: 70 10*3/MM3 (ref 140–450)
PMV BLD AUTO: 9.8 FL (ref 6–12)
POTASSIUM SERPL-SCNC: 4.2 MMOL/L (ref 3.5–5.2)
PROT UR QL STRIP: NEGATIVE
RBC # BLD AUTO: 3.2 10*6/MM3 (ref 3.77–5.28)
RBC # UR STRIP: NORMAL /HPF
REF LAB TEST METHOD: NORMAL
SODIUM SERPL-SCNC: 135 MMOL/L (ref 136–145)
SP GR UR STRIP: 1.01 (ref 1–1.03)
SQUAMOUS #/AREA URNS HPF: NORMAL /HPF
TROPONIN T % DELTA: -8
TROPONIN T NUMERIC DELTA: -2 NG/L
TROPONIN T SERPL HS-MCNC: 24 NG/L
UROBILINOGEN UR QL STRIP: ABNORMAL
WBC # UR STRIP: NORMAL /HPF
WBC NRBC COR # BLD AUTO: 3.19 10*3/MM3 (ref 3.4–10.8)
WHOLE BLOOD HOLD COAG: NORMAL

## 2025-03-16 PROCEDURE — 80048 BASIC METABOLIC PNL TOTAL CA: CPT | Performed by: EMERGENCY MEDICINE

## 2025-03-16 PROCEDURE — 83880 ASSAY OF NATRIURETIC PEPTIDE: CPT | Performed by: EMERGENCY MEDICINE

## 2025-03-16 PROCEDURE — 85025 COMPLETE CBC W/AUTO DIFF WBC: CPT | Performed by: EMERGENCY MEDICINE

## 2025-03-16 PROCEDURE — 99285 EMERGENCY DEPT VISIT HI MDM: CPT

## 2025-03-16 PROCEDURE — 84484 ASSAY OF TROPONIN QUANT: CPT | Performed by: EMERGENCY MEDICINE

## 2025-03-16 PROCEDURE — 82948 REAGENT STRIP/BLOOD GLUCOSE: CPT

## 2025-03-16 PROCEDURE — 25010000002 ENOXAPARIN PER 10 MG: Performed by: NURSE PRACTITIONER

## 2025-03-16 PROCEDURE — 93005 ELECTROCARDIOGRAM TRACING: CPT | Performed by: EMERGENCY MEDICINE

## 2025-03-16 PROCEDURE — 83735 ASSAY OF MAGNESIUM: CPT | Performed by: EMERGENCY MEDICINE

## 2025-03-16 PROCEDURE — 25010000002 FUROSEMIDE PER 20 MG: Performed by: EMERGENCY MEDICINE

## 2025-03-16 PROCEDURE — 81001 URINALYSIS AUTO W/SCOPE: CPT | Performed by: STUDENT IN AN ORGANIZED HEALTH CARE EDUCATION/TRAINING PROGRAM

## 2025-03-16 PROCEDURE — 87086 URINE CULTURE/COLONY COUNT: CPT | Performed by: STUDENT IN AN ORGANIZED HEALTH CARE EDUCATION/TRAINING PROGRAM

## 2025-03-16 PROCEDURE — 71045 X-RAY EXAM CHEST 1 VIEW: CPT

## 2025-03-16 PROCEDURE — 36415 COLL VENOUS BLD VENIPUNCTURE: CPT

## 2025-03-16 PROCEDURE — 63710000001 INSULIN LISPRO (HUMAN) PER 5 UNITS: Performed by: NURSE PRACTITIONER

## 2025-03-16 RX ORDER — BISACODYL 10 MG
10 SUPPOSITORY, RECTAL RECTAL DAILY PRN
Status: DISCONTINUED | OUTPATIENT
Start: 2025-03-16 | End: 2025-03-18 | Stop reason: HOSPADM

## 2025-03-16 RX ORDER — ACETAMINOPHEN 650 MG/1
650 SUPPOSITORY RECTAL EVERY 4 HOURS PRN
Status: DISCONTINUED | OUTPATIENT
Start: 2025-03-16 | End: 2025-03-18 | Stop reason: HOSPADM

## 2025-03-16 RX ORDER — NICOTINE POLACRILEX 4 MG
15 LOZENGE BUCCAL
Status: DISCONTINUED | OUTPATIENT
Start: 2025-03-16 | End: 2025-03-18 | Stop reason: HOSPADM

## 2025-03-16 RX ORDER — AMOXICILLIN 250 MG
2 CAPSULE ORAL 2 TIMES DAILY PRN
Status: DISCONTINUED | OUTPATIENT
Start: 2025-03-16 | End: 2025-03-18 | Stop reason: HOSPADM

## 2025-03-16 RX ORDER — INSULIN LISPRO 100 [IU]/ML
2-7 INJECTION, SOLUTION INTRAVENOUS; SUBCUTANEOUS
Status: DISCONTINUED | OUTPATIENT
Start: 2025-03-16 | End: 2025-03-18 | Stop reason: HOSPADM

## 2025-03-16 RX ORDER — FUROSEMIDE 10 MG/ML
40 INJECTION INTRAMUSCULAR; INTRAVENOUS
Status: DISCONTINUED | OUTPATIENT
Start: 2025-03-17 | End: 2025-03-17

## 2025-03-16 RX ORDER — ENOXAPARIN SODIUM 100 MG/ML
30 INJECTION SUBCUTANEOUS DAILY
Status: DISCONTINUED | OUTPATIENT
Start: 2025-03-16 | End: 2025-03-18 | Stop reason: HOSPADM

## 2025-03-16 RX ORDER — POLYETHYLENE GLYCOL 3350 17 G/17G
17 POWDER, FOR SOLUTION ORAL DAILY PRN
Status: DISCONTINUED | OUTPATIENT
Start: 2025-03-16 | End: 2025-03-18 | Stop reason: HOSPADM

## 2025-03-16 RX ORDER — SODIUM CHLORIDE 9 MG/ML
40 INJECTION, SOLUTION INTRAVENOUS AS NEEDED
Status: DISCONTINUED | OUTPATIENT
Start: 2025-03-16 | End: 2025-03-18 | Stop reason: HOSPADM

## 2025-03-16 RX ORDER — ACETAMINOPHEN 160 MG/5ML
650 SOLUTION ORAL EVERY 4 HOURS PRN
Status: DISCONTINUED | OUTPATIENT
Start: 2025-03-16 | End: 2025-03-18 | Stop reason: HOSPADM

## 2025-03-16 RX ORDER — SODIUM CHLORIDE 0.9 % (FLUSH) 0.9 %
10 SYRINGE (ML) INJECTION AS NEEDED
Status: DISCONTINUED | OUTPATIENT
Start: 2025-03-16 | End: 2025-03-18 | Stop reason: HOSPADM

## 2025-03-16 RX ORDER — DEXTROSE MONOHYDRATE 25 G/50ML
25 INJECTION, SOLUTION INTRAVENOUS
Status: DISCONTINUED | OUTPATIENT
Start: 2025-03-16 | End: 2025-03-18 | Stop reason: HOSPADM

## 2025-03-16 RX ORDER — ACETAMINOPHEN 325 MG/1
650 TABLET ORAL EVERY 4 HOURS PRN
Status: DISCONTINUED | OUTPATIENT
Start: 2025-03-16 | End: 2025-03-18 | Stop reason: HOSPADM

## 2025-03-16 RX ORDER — FUROSEMIDE 10 MG/ML
80 INJECTION INTRAMUSCULAR; INTRAVENOUS ONCE
Status: COMPLETED | OUTPATIENT
Start: 2025-03-16 | End: 2025-03-16

## 2025-03-16 RX ORDER — HYDROCODONE BITARTRATE AND ACETAMINOPHEN 10; 325 MG/1; MG/1
1 TABLET ORAL EVERY 4 HOURS PRN
Refills: 0 | Status: DISCONTINUED | OUTPATIENT
Start: 2025-03-16 | End: 2025-03-18 | Stop reason: HOSPADM

## 2025-03-16 RX ORDER — BISACODYL 5 MG/1
5 TABLET, DELAYED RELEASE ORAL DAILY PRN
Status: DISCONTINUED | OUTPATIENT
Start: 2025-03-16 | End: 2025-03-18 | Stop reason: HOSPADM

## 2025-03-16 RX ORDER — IBUPROFEN 600 MG/1
1 TABLET ORAL
Status: DISCONTINUED | OUTPATIENT
Start: 2025-03-16 | End: 2025-03-18 | Stop reason: HOSPADM

## 2025-03-16 RX ORDER — SODIUM CHLORIDE 0.9 % (FLUSH) 0.9 %
10 SYRINGE (ML) INJECTION EVERY 12 HOURS SCHEDULED
Status: DISCONTINUED | OUTPATIENT
Start: 2025-03-16 | End: 2025-03-18 | Stop reason: HOSPADM

## 2025-03-16 RX ADMIN — FUROSEMIDE 80 MG: 10 INJECTION, SOLUTION INTRAMUSCULAR; INTRAVENOUS at 13:23

## 2025-03-16 RX ADMIN — Medication 10 ML: at 21:58

## 2025-03-16 RX ADMIN — INSULIN LISPRO 2 UNITS: 100 INJECTION, SOLUTION INTRAVENOUS; SUBCUTANEOUS at 21:57

## 2025-03-16 RX ADMIN — Medication 10 ML: at 14:57

## 2025-03-16 RX ADMIN — ENOXAPARIN SODIUM 30 MG: 100 INJECTION SUBCUTANEOUS at 16:45

## 2025-03-16 NOTE — ED NOTES
Nursing report ED to floor  Gladys Garrison  64 y.o.  female    HPI:   Chief Complaint   Patient presents with    Edema     Swelling to feet, Pt reports her BNP is high, she gets SOA, pt repots all started when was D/C from sepsis due to kidney failure.         Admitting doctor:   Jacky Bradshaw MD    Admitting diagnosis:   The primary encounter diagnosis was Dyspnea, unspecified type. Diagnoses of Bilateral lower extremity edema and Elevated brain natriuretic peptide (BNP) level were also pertinent to this visit.    Code status:   Current Code Status       Date Active Code Status Order ID Comments User Context       Prior            Allergies:   Promethazine, Tape, Adhesive tape, and Flexeril [cyclobenzaprine]    Isolation:  No active isolations     Fall Risk:  Fall Risk Assessment was completed, and patient is at low risk for falls.   Predictive Model Details         4 (Low) Factor Value    Calculated 3/16/2025 13:30 Age 64    Risk of Fall Model Active Peripheral IV Present     Imaging order in this encounter Present     Magnesium 2.2 mg/dL     Diastolic BP 71     Durga Scale not on file     Number of Distinct Medication Classes administered 1     Cardiac Assessment X     Albumin not on file     Calcium 9.2 mg/dL     Chloride 103 mmol/L     Total Bilirubin not on file     Duration of Current Encounter 0.091 days     Potassium 4.2 mmol/L     Respiratory Rate 15     ALT not on file     Creatinine 0.76 mg/dL         Weight:       03/16/25  1118   Weight: 54.5 kg (120 lb 2.4 oz)       Intake and Output  No intake or output data in the 24 hours ending 03/16/25 1339    Diet:        Most recent vitals:   Vitals:    03/16/25 1245 03/16/25 1300 03/16/25 1315 03/16/25 1330   BP: 133/65 139/71 139/55 163/74   Pulse: 60 60 59 60   Resp: 15   15   Temp:       TempSrc:       SpO2: 95% 94% 97% 100%   Weight:       Height:           Active LDAs/IV Access:   Lines, Drains & Airways       Active LDAs       Name Placement date  Placement time Site Days    Peripheral IV 03/16/25 1214 Anterior;Right;Upper Arm 03/16/25  1214  Arm  less than 1    Pump Device 01/08/25  1000  --  67                    Skin Condition:   Skin Assessments (last day)       None             Labs (abnormal labs have a star):   Labs Reviewed   BASIC METABOLIC PANEL - Abnormal; Notable for the following components:       Result Value    Sodium 135 (*)     All other components within normal limits    Narrative:     GFR Categories in Chronic Kidney Disease (CKD)      GFR Category          GFR (mL/min/1.73)    Interpretation  G1                     90 or greater         Normal or high (1)  G2                      60-89                Mild decrease (1)  G3a                   45-59                Mild to moderate decrease  G3b                   30-44                Moderate to severe decrease  G4                    15-29                Severe decrease  G5                    14 or less           Kidney failure          (1)In the absence of evidence of kidney disease, neither GFR category G1 or G2 fulfill the criteria for CKD.    eGFR calculation 2021 CKD-EPI creatinine equation, which does not include race as a factor   TROPONIN - Abnormal; Notable for the following components:    HS Troponin T 24 (*)     All other components within normal limits    Narrative:     High Sensitive Troponin T Reference Range:  <14.0 ng/L- Negative Female for AMI  <22.0 ng/L- Negative Male for AMI  >=14 - Abnormal Female indicating possible myocardial injury.  >=22 - Abnormal Male indicating possible myocardial injury.   Clinicians would have to utilize clinical acumen, EKG, Troponin, and serial changes to determine if it is an Acute Myocardial Infarction or myocardial injury due to an underlying chronic condition.        BNP (IN-HOUSE) - Abnormal; Notable for the following components:    proBNP 2,523.0 (*)     All other components within normal limits    Narrative:     This assay is used as an  aid in the diagnosis of individuals suspected of having heart failure. It can be used as an aid in the diagnosis of acute decompensated heart failure (ADHF) in patients presenting with signs and symptoms of ADHF to the emergency department (ED). In addition, NT-proBNP of <300 pg/mL indicates ADHF is not likely.    Age Range Result Interpretation  NT-proBNP Concentration (pg/mL:      <50             Positive            >450                   Gray                 300-450                    Negative             <300    50-75           Positive            >900                  Gray                300-900                  Negative            <300      >75             Positive            >1800                  Gray                300-1800                  Negative            <300   CBC WITH AUTO DIFFERENTIAL - Abnormal; Notable for the following components:    WBC 3.19 (*)     RBC 3.20 (*)     Hemoglobin 8.8 (*)     Hematocrit 29.9 (*)     MCHC 29.4 (*)     Platelets 70 (*)     Neutrophil % 80.9 (*)     Lymphocyte % 12.2 (*)     Lymphocytes, Absolute 0.39 (*)     All other components within normal limits   MAGNESIUM - Normal   HIGH SENSITIVITIY TROPONIN T 1HR   CBC AND DIFFERENTIAL    Narrative:     The following orders were created for panel order CBC & Differential.  Procedure                               Abnormality         Status                     ---------                               -----------         ------                     CBC Auto Differential[[id number]]        Abnormal            Final result                 Please view results for these tests on the individual orders.   EXTRA TUBES    Narrative:     The following orders were created for panel order Extra Tubes.  Procedure                               Abnormality         Status                     ---------                               -----------         ------                     Gold Top - [facility][[id number]]                                   Final  result               Light Blue Top[[id number]]                                   Final result                 Please view results for these tests on the individual orders.   GOLD TOP - SST   LIGHT BLUE TOP       LOC: Person, Place, Time, and Situation    Telemetry:  Telemetry    Cardiac Monitoring Ordered: yes    EKG:   ECG 12 Lead Dyspnea   Preliminary Result   HEART RATE=60  bpm   RR Trhwiyrq=2459  ms   VA Zjfcjewy=792  ms   P Horizontal Axis=-67  deg   P Front Axis=0  deg   QRSD Lxghxbcj=522  ms   QT Zkwitedx=512  ms   TUgF=305  ms   QRS Axis=225  deg   T Wave Axis=113  deg   - ABNORMAL ECG -   Ventricular-paced rhythm   Date and Time of Study:2025-03-16 11:28:03          Medications Given in the ED:   Medications   sodium chloride 0.9 % flush 10 mL (has no administration in time range)   furosemide (LASIX) injection 80 mg (80 mg Intravenous Given 3/16/25 1323)       Imaging results:  XR Chest 1 View  Result Date: 3/16/2025  Impression: Cardiomegaly and central pulmonary vascular congestion. Electronically Signed: Westley Kirkpatrick MD  3/16/2025 12:31 PM EDT  Workstation ID: [id number]      Social issues:   Social History     Socioeconomic History    Marital status:     Number of children: 2   Tobacco Use    Smoking status: Never     Passive exposure: Never    Smokeless tobacco: Never   Vaping Use    Vaping status: Never Used   Substance and Sexual Activity    Alcohol use: Yes     Comment: drinks rarely    Drug use: Never    Sexual activity: Defer       NIH Stroke Scale:  Interval: (not recorded)  1a. Level of Consciousness: (not recorded)  1b. LOC Questions: (not recorded)  1c. LOC Commands: (not recorded)  2. Best Gaze: (not recorded)  3. Visual: (not recorded)  4. Facial Palsy: (not recorded)  5a. Motor Arm, Left: (not recorded)  5b. Motor Arm, Right: (not recorded)  6a. Motor Leg, Left: (not recorded)  6b. Motor Leg, Right: (not recorded)  7. Limb Ataxia: (not recorded)  8. Sensory: (not recorded)  9. Best  Language: (not recorded)  10. Dysarthria: (not recorded)  11. Extinction and Inattention (formerly Neglect): (not recorded)    Total (NIH Stroke Scale): (not recorded)     Additional notable assessment information:.     Nursing report ED to floor:  Dennise Guzman RN   03/16/25 13:39 EDT

## 2025-03-16 NOTE — LETTER
March 18, 2025     Patient: Gladys Garrison   YOB: 1960   Date of Visit: 3/16/2025       To Whom It May Concern:    It is my medical opinion that Gladys Garrison may return to work on 3/19/2025 . Gladys was a patient at [facility] from 3/16/2025 to 3/18/2025.            Sincerely,        Yahaira Helm RN     CC: No Recipients

## 2025-03-16 NOTE — PLAN OF CARE
Problem: Adult Inpatient Plan of Care  Goal: Plan of Care Review  Outcome: Progressing  Flowsheets (Taken 3/16/2025 1653)  Progress: improving  Outcome Evaluation: Patient arrived to the unit from the ED. Patient is currently resting abed. BLE swelling has improved. Patient has no complaints at this time. Bed is in the lowest and locked position with call light in reach.  Plan of Care Reviewed With: patient  Goal: Patient-Specific Goal (Individualized)  Outcome: Progressing  Goal: Absence of Hospital-Acquired Illness or Injury  Outcome: Progressing  Intervention: Identify and Manage Fall Risk  Recent Flowsheet Documentation  Taken 3/16/2025 1442 by Jairo Osman RN  Safety Promotion/Fall Prevention:   safety round/check completed   room organization consistent   nonskid shoes/slippers when out of bed   clutter free environment maintained  Intervention: Prevent Skin Injury  Recent Flowsheet Documentation  Taken 3/16/2025 1442 by Jairo Osman RN  Skin Protection: transparent dressing maintained  Intervention: Prevent and Manage VTE (Venous Thromboembolism) Risk  Recent Flowsheet Documentation  Taken 3/16/2025 1442 by Jairo Osman RN  VTE Prevention/Management:   bilateral   SCDs (sequential compression devices) off  Intervention: Prevent Infection  Recent Flowsheet Documentation  Taken 3/16/2025 1442 by Jairo Osman RN  Infection Prevention:   single patient room provided   rest/sleep promoted   personal protective equipment utilized   hand hygiene promoted  Goal: Optimal Comfort and Wellbeing  Outcome: Progressing  Intervention: Monitor Pain and Promote Comfort  Recent Flowsheet Documentation  Taken 3/16/2025 1442 by Jairo Osman RN  Pain Management Interventions:   position adjusted   pillow support provided  Intervention: Provide Person-Centered Care  Recent Flowsheet Documentation  Taken 3/16/2025 1442 by Jairo Osman RN  Trust Relationship/Rapport:   care explained   choices provided    questions answered   questions encouraged  Goal: Readiness for Transition of Care  Outcome: Progressing  Intervention: Mutually Develop Transition Plan  Recent Flowsheet Documentation  Taken 3/16/2025 1440 by Jairo Osman, RN  Transportation Anticipated: family or friend will provide  Patient/Family Anticipated Services at Transition: none  Patient/Family Anticipates Transition to: home with family  Taken 3/16/2025 1438 by Jairo Osman, RN  Equipment Currently Used at Home: none   Goal Outcome Evaluation:  Plan of Care Reviewed With: patient        Progress: improving  Outcome Evaluation: Patient arrived to the unit from the ED. Patient is currently resting abed. BLE swelling has improved. Patient has no complaints at this time. Bed is in the lowest and locked position with call light in reach.

## 2025-03-16 NOTE — ED PROVIDER NOTES
Subjective   History of Present Illness  Chief complaint: Leg swelling    64-year-old female presents with bilateral lower extremity edema.  Patient states symptoms have been present over the past 2 weeks.  She also reports shortness of breath that is worse with exertion.  She saw her primary provider recently who recommended she come to the emergency room.  She denies chest pain.  She has had no fever.    History provided by:  Patient      Review of Systems   Constitutional:  Negative for fever.   HENT:  Negative for congestion.    Respiratory:  Positive for shortness of breath. Negative for cough.    Cardiovascular:  Positive for leg swelling. Negative for chest pain.   Gastrointestinal:  Negative for abdominal pain and vomiting.   Musculoskeletal:  Negative for back pain.   Neurological:  Negative for headaches.   Psychiatric/Behavioral:  Negative for confusion.        Past Medical History:   Diagnosis Date    Allergic     Bone cancer     METASTATIC BONE; stage 4    Bradycardia     SECONDARY TO ABLATION    Breast cancer 2017    mets to lymph nodes; did not do radiation    Cancer of unknown origin     Compression fx, thoracic spine, open, initial encounter     Coronary artery disease     COVID-19 09/01/2021    Diabetes mellitus     Heart defect, congenital 1962    tratology of flow (spelling?)    Heart murmur     Hepatitis C     RESOLVED WITH MEDICATION    Hyperlipidemia     Hypertension     Leaky heart valve     tricuspid & mitral valves leak; not yet enough to warrent another open heart surgery    Obesity (BMI 30-39.9) 02/05/2021    Pacemaker     dependent, because of ablations to treat tachycardia    Rib fracture     Per patient    Sepsis 01/2025    Sleep apnea     no machine    Tachycardia     ATRIAL    Type 2 diabetes mellitus 11/2017       Allergies   Allergen Reactions    Promethazine Other (See Comments)     Hyperactive mean    Tape Other (See Comments)     .blisters      Adhesive Tape Rash     Flexeril [Cyclobenzaprine] Rash       Past Surgical History:   Procedure Laterality Date    BACK SURGERY      neck X 2    BREAST RECONSTRUCTION Bilateral     CARDIAC ABLATION       atrial tachycardia x 5 ablations     CARDIAC CATHETERIZATION      CARDIAC ELECTROPHYSIOLOGY PROCEDURE N/A 2023    Procedure: Pacemaker RV lead insertion with generator change out. [employer];  Surgeon: Steven Hammond MD;  Location: [facility] CATH INVASIVE LOCATION;  Service: Cardiovascular;  Laterality: N/A;    CARDIAC SURGERY      stent placed in aorta    CARDIAC SURGERY      6 surgeries as baby     CERVICAL FUSION ANTERIOR WITH ARTIFICIAL DISCECTOMY IMPLANTATION N/A 2020    Procedure: C4 VERTEBRECTOMY AND ANTERIOR CERIVCAL DISCECTOMY WITH FUSION OF CERVICAL THREE THROUGH FIVE WITH REMOVAL OF HARDWARE C5-C6;  Surgeon: Mark Kaba MD;  Location: [facility] MAIN OR;  Service: Neurosurgery;  Laterality: N/A;     SECTION      x2    COLONOSCOPY N/A 10/23/2020    Procedure: COLONOSCOPY WITH POLYPECTOMY X6;  Surgeon: Stanley Bourne MD;  Location: [facility] ENDOSCOPY;  Service: Gastroenterology;  Laterality: N/A;  POLYPS, INTERNAL HEMORRHOIDS    ENDOMETRIAL ABLATION      REMOVAL SCAR TISSUE UTERINE    ENDOSCOPY N/A 10/23/2020    Procedure: ESOPHAGOGASTRODUODENOSCOPY WITH BIOPSY X 1 AREA;  Surgeon: Stanley Bourne MD;  Location: [facility] ENDOSCOPY;  Service: Gastroenterology;  Laterality: N/A;  GASTRITIS, ESOPHAGITIS, HIATAL HERNIA    INSERT / REPLACE / REMOVE PACEMAKER      KNEE SURGERY      MASTECTOMY Bilateral     NECK SURGERY      PACEMAKER IMPLANTATION      [employer]    PAIN PUMP INSERTION/REVISION N/A 2022    Procedure: PAIN PUMP INSERTION AND INTRATHECAL CATHETER PLACEMENT;  Surgeon: Chuck Hunter MD;  Location: [facility] MAIN OR;  Service: Pain Management;  Laterality: N/A;       Family History   Problem Relation Age of Onset    Heart disease Mother     Stroke Mother     Lung cancer  "Mother     Aneurysm Father     Diabetes Sister     No Known Problems Brother     No Known Problems Brother     Diabetes type I Half-Sister     Thyroid cancer Half-Sister     Cancer Maternal Aunt     Heart attack Sister     Thyroid disease Sister     No Known Problems Sister        Social History     Socioeconomic History    Marital status:     Number of children: 2   Tobacco Use    Smoking status: Never     Passive exposure: Never    Smokeless tobacco: Never   Vaping Use    Vaping status: Never Used   Substance and Sexual Activity    Alcohol use: Yes     Comment: drinks rarely    Drug use: Never    Sexual activity: Defer       /71   Pulse 60   Temp 98.5 °F (36.9 °C) (Oral)   Resp 15   Ht 152.4 cm (60\")   Wt 54.5 kg (120 lb 2.4 oz)   LMP  (LMP Unknown)   SpO2 94%   BMI 23.47 kg/m²       Objective   Physical Exam  Vitals and nursing note reviewed.   Constitutional:       Appearance: Normal appearance.   HENT:      Head: Normocephalic and atraumatic.      Mouth/Throat:      Mouth: Mucous membranes are moist.   Cardiovascular:      Rate and Rhythm: Normal rate and regular rhythm.   Pulmonary:      Effort: Pulmonary effort is normal. No respiratory distress.      Breath sounds: Normal breath sounds.   Abdominal:      Palpations: Abdomen is soft.      Tenderness: There is no abdominal tenderness.   Musculoskeletal:      Right lower leg: Edema present.      Left lower leg: Edema present.   Skin:     General: Skin is warm and dry.   Neurological:      Mental Status: She is alert and oriented to person, place, and time.         Procedures           ED Course      My interpretation of EKG shows ventricular paced rhythm, rate of 60, unchanged from previous                                     Results for orders placed or performed during the hospital encounter of 03/16/25   ECG 12 Lead Dyspnea    Collection Time: 03/16/25 11:28 AM   Result Value Ref Range    QT Interval 474 ms    QTC Interval 474 ms "   Basic Metabolic Panel    Collection Time: 03/16/25 12:12 PM    Specimen: Blood   Result Value Ref Range    Glucose 88 65 - 99 mg/dL    BUN 13 8 - 23 mg/dL    Creatinine 0.76 0.57 - 1.00 mg/dL    Sodium 135 (L) 136 - 145 mmol/L    Potassium 4.2 3.5 - 5.2 mmol/L    Chloride 103 98 - 107 mmol/L    CO2 24.7 22.0 - 29.0 mmol/L    Calcium 9.2 8.6 - 10.5 mg/dL    BUN/Creatinine Ratio 17.1 7.0 - 25.0    Anion Gap 7.3 5.0 - 15.0 mmol/L    eGFR 88.7 >60.0 mL/min/1.73   High Sensitivity Troponin T    Collection Time: 03/16/25 12:12 PM    Specimen: Blood   Result Value Ref Range    HS Troponin T 24 (H) <14 ng/L   BNP    Collection Time: 03/16/25 12:12 PM    Specimen: Blood   Result Value Ref Range    proBNP 2,523.0 (H) 0.0 - 900.0 pg/mL   Magnesium    Collection Time: 03/16/25 12:12 PM    Specimen: Blood   Result Value Ref Range    Magnesium 2.2 1.6 - 2.4 mg/dL   CBC Auto Differential    Collection Time: 03/16/25 12:12 PM    Specimen: Blood   Result Value Ref Range    WBC 3.19 (L) 3.40 - 10.80 10*3/mm3    RBC 3.20 (L) 3.77 - 5.28 10*6/mm3    Hemoglobin 8.8 (L) 12.0 - 15.9 g/dL    Hematocrit 29.9 (L) 34.0 - 46.6 %    MCV 93.4 79.0 - 97.0 fL    MCH 27.5 26.6 - 33.0 pg    MCHC 29.4 (L) 31.5 - 35.7 g/dL    RDW 15.4 12.3 - 15.4 %    RDW-SD 53.1 37.0 - 54.0 fl    MPV 9.8 6.0 - 12.0 fL    Platelets 70 (L) 140 - 450 10*3/mm3    Neutrophil % 80.9 (H) 42.7 - 76.0 %    Lymphocyte % 12.2 (L) 19.6 - 45.3 %    Monocyte % 5.0 5.0 - 12.0 %    Eosinophil % 1.3 0.3 - 6.2 %    Basophil % 0.3 0.0 - 1.5 %    Immature Grans % 0.3 0.0 - 0.5 %    Neutrophils, Absolute 2.58 1.70 - 7.00 10*3/mm3    Lymphocytes, Absolute 0.39 (L) 0.70 - 3.10 10*3/mm3    Monocytes, Absolute 0.16 0.10 - 0.90 10*3/mm3    Eosinophils, Absolute 0.04 0.00 - 0.40 10*3/mm3    Basophils, Absolute 0.01 0.00 - 0.20 10*3/mm3    Immature Grans, Absolute 0.01 0.00 - 0.05 10*3/mm3    nRBC 0.0 0.0 - 0.2 /100 WBC   [facility]    Collection Time: 03/16/25 12:12 PM   Result Value Ref  Range    Extra Tube Hold for add-ons.    Light Blue Top    Collection Time: 03/16/25 12:12 PM   Result Value Ref Range    Extra Tube Hold for add-ons.      *Note: Due to a large number of results and/or encounters for the requested time period, some results have not been displayed. A complete set of results can be found in Results Review.     XR Chest 1 View  Result Date: 3/16/2025  Impression: Cardiomegaly and central pulmonary vascular congestion. Electronically Signed: Westley Kirkpatrick MD  3/16/2025 12:31 PM EDT  Workstation ID: [id number]                  Medical Decision Making  Amount and/or Complexity of Data Reviewed  Labs: ordered.  Radiology: ordered.  ECG/medicine tests: ordered.    Risk  Prescription drug management.      Patient had the above evaluation.  Results were discussed with patient.  My interpretation of chest x-ray shows cardiomegaly and mild evidence of failure.  EKG shows no obvious ischemia.  Troponin is 24.  BNP is elevated at 2500.  BMP is unremarkable.  Hemoglobin is 8.8 which appears to be near baseline.  Patient does appear to be fluid overloaded and was given a dose of Lasix in the emergency room.  I discussed with the provider on-call for the hospitalist and the patient will be admitted for further evaluation and management.      Final diagnoses:   Dyspnea, unspecified type   Bilateral lower extremity edema   Elevated brain natriuretic peptide (BNP) level       ED Disposition  ED Disposition       ED Disposition   Decision to Admit    Condition   --    Comment   Level of Care: Telemetry [5]   Admitting Physician: MEGHA DOHERTY [[id number]]   Attending Physician: MEGHA DOHERTY [[id number]]                 No follow-up provider specified.       Medication List      No changes were made to your prescriptions during this visit.            Ki Palacios MD  03/16/25 1206

## 2025-03-16 NOTE — H&P
[facility] Medicine Services  History & Physical    Patient Name: Gladys Garrison  : 1960  MRN: [id number]  Primary Care Physician:  Chirag Robles DO  Date of admission: 3/16/2025  Date and Time of Service: 3/16/2025 at 1:14 PM EDT      Subjective      Chief Complaint: Dyspnea    History of Present Illness: Gladys Garrison is a 64 y.o. female with a CMH of breast cancer, tetralogy of Fallot, systolic CHF, pacemaker, HTN, who presented to [facility] on 3/16/2025 with dyspnea patient states that she has been having complaints of shortness of progressive shortness of breath over the last several weeks.  Of note patient was recently admitted.2025-2025 for sepsis, group B bacteremia with ICD lead vegetation.  Patient states that she has been typically shortness of breath since discharge, this is progressively became worse, patient also with dyspnea on exertion.  Also with complaints of bilateral lower extremity edema.  Patient was evaluated by her primary care doctor within the last 2 weeks, who recommended patient to come to the emergency department for further evaluation.  At this time patient denies any chest pain, fever, cough, congestion      Review of Systems  Review of Systems   Constitutional:  Negative for fever.   HENT:  Negative for congestion.    Respiratory:  Positive for shortness of breath. Negative for cough.    Cardiovascular:  Positive for leg swelling. Negative for chest pain.   Gastrointestinal:  Negative for abdominal pain and vomiting.   Musculoskeletal:  Negative for back pain.   Neurological:  Negative for headaches.   Psychiatric/Behavioral:  Negative for confusion.    Personal History     Past Medical History:   Diagnosis Date    Allergic     Bone cancer     METASTATIC BONE; stage 4    Bradycardia     SECONDARY TO ABLATION    Breast cancer 2017    mets to lymph nodes; did not do radiation    Cancer of unknown origin     Compression fx, thoracic  spine, open, initial encounter     Coronary artery disease     COVID-19 2021    Diabetes mellitus     Heart defect, congenital 1960    tratology of flow (spelling?)    Heart murmur     Hepatitis C     RESOLVED WITH MEDICATION    Hyperlipidemia     Hypertension     Leaky heart valve     tricuspid & mitral valves leak; not yet enough to warrent another open heart surgery    Obesity (BMI 30-39.9) 2021    Pacemaker     dependent, because of ablations to treat tachycardia    Rib fracture     Per patient    Sepsis 2025    Sleep apnea     no machine    Tachycardia     ATRIAL    Type 2 diabetes mellitus 2017       Past Surgical History:   Procedure Laterality Date    BACK SURGERY      neck X 2    BREAST RECONSTRUCTION Bilateral     CARDIAC ABLATION       atrial tachycardia x 5 ablations     CARDIAC CATHETERIZATION      CARDIAC ELECTROPHYSIOLOGY PROCEDURE N/A 2023    Procedure: Pacemaker RV lead insertion with generator change out. The LAB [employer];  Surgeon: Steven Hammond MD;  Location: [facility] CATH INVASIVE LOCATION;  Service: Cardiovascular;  Laterality: N/A;    CARDIAC SURGERY      stent placed in aorta    CARDIAC SURGERY      6 surgeries as baby     CERVICAL FUSION ANTERIOR WITH ARTIFICIAL DISCECTOMY IMPLANTATION N/A 2020    Procedure: C4 VERTEBRECTOMY AND ANTERIOR CERIVCAL DISCECTOMY WITH FUSION OF CERVICAL THREE THROUGH FIVE WITH REMOVAL OF HARDWARE C5-C6;  Surgeon: Mark Kaba MD;  Location: [facility] MAIN OR;  Service: Neurosurgery;  Laterality: N/A;     SECTION      x2    COLONOSCOPY N/A 10/23/2020    Procedure: COLONOSCOPY WITH POLYPECTOMY X6;  Surgeon: Stanley Bourne MD;  Location: [facility] ENDOSCOPY;  Service: Gastroenterology;  Laterality: N/A;  POLYPS, INTERNAL HEMORRHOIDS    ENDOMETRIAL ABLATION      REMOVAL SCAR TISSUE UTERINE    ENDOSCOPY N/A 10/23/2020    Procedure: ESOPHAGOGASTRODUODENOSCOPY WITH BIOPSY X 1 AREA;  Surgeon: Stanley Bourne,  MD;  Location: [facility] ENDOSCOPY;  Service: Gastroenterology;  Laterality: N/A;  GASTRITIS, ESOPHAGITIS, HIATAL HERNIA    INSERT / REPLACE / REMOVE PACEMAKER      KNEE SURGERY  2000    MASTECTOMY Bilateral     NECK SURGERY      PACEMAKER IMPLANTATION      [employer]    PAIN PUMP INSERTION/REVISION N/A 04/05/2022    Procedure: PAIN PUMP INSERTION AND INTRATHECAL CATHETER PLACEMENT;  Surgeon: Chuck Hunter MD;  Location: [facility] MAIN OR;  Service: Pain Management;  Laterality: N/A;       Family History: family history includes Aneurysm in her father; Cancer in her maternal aunt; Diabetes in her sister; Diabetes type I in her half-sister; Heart attack in her sister; Heart disease in her mother; Lung cancer in her mother; No Known Problems in her brother, brother, and sister; Stroke in her mother; Thyroid cancer in her half-sister; Thyroid disease in her sister. Otherwise pertinent FHx was reviewed and not pertinent to current issue.    Social History:  reports that she has never smoked. She has never been exposed to tobacco smoke. She has never used smokeless tobacco. She reports current alcohol use. She reports that she does not use drugs.    Home Medications:  Prior to Admission Medications       Prescriptions Last Dose Informant Patient Reported? Taking?    Blood Glucose Monitoring Suppl (Accu-Chek Araceli) device   No No    Use as instructed   To test blood sugar bid    Continuous Blood Gluc  (FreeStyle Rajeev 14 Day [place]) device   No No    1 each Daily.    Continuous Blood Gluc Sensor (FreeStyle Rajeev 14 Day Sensor) misc   No No    1 each Daily.    furosemide (LASIX) 20 MG tablet   No No    Take 1 tablet by mouth Daily for 30 days.    HYDROcodone-acetaminophen (NORCO)  MG per tablet   No No    Take 1 tablet by mouth Every 4 (Four) Hours As Needed for Moderate Pain.    insulin NPH-insulin regular (humuLIN 70/30,novoLIN 70/30) (70-30) 100 UNIT/ML injection   No No    Inject 5 Units under the skin into  "the appropriate area as directed Every Evening. If BS over 180    Insulin Pen Needle (B-D UF III MINI PEN NEEDLES) 31G X 5 MM misc   No No    Use to inject insulin twice daily   DX: E11.9    Insulin Syringe-Needle U-100 28G X 1/2\" 1 ML misc   No No    1 each 2 (Two) Times a Day.    morphine 5 mg/mL   No No    by Intrathecal route Continuous.    Patient not taking:  Reported on 3/7/2025    Morphine Sulfate, PF, 8 MG/ML solution   Yes No    1 mg by Intrathecal Infusion route Daily. Receives 1 mg through pain pump q 24 hrs              Allergies:  Allergies   Allergen Reactions    Promethazine Other (See Comments)     Hyperactive mean    Tape Other (See Comments)     .blisters      Adhesive Tape Rash    Flexeril [Cyclobenzaprine] Rash       Objective      Vitals:   Temp:  [98.5 °F (36.9 °C)] 98.5 °F (36.9 °C)  Heart Rate:  [59-63] 60  Resp:  [13-20] 17  BP: (133-174)/(55-77) 153/72  Body mass index is 23.47 kg/m².  Physical Exam  Physical Exam  Vitals and nursing note reviewed.   Constitutional:       Appearance: Normal appearance.   HENT:      Head: Normocephalic and atraumatic.      Mouth/Throat:      Mouth: Mucous membranes are moist.   Cardiovascular:      Rate and Rhythm: Normal rate and regular rhythm.  Pacemaker present  Pulmonary:      Effort: Pulmonary effort is normal. No respiratory distress.      Breath sounds: Normal breath sounds.   Abdominal:      Palpations: Abdomen is soft.      Tenderness: There is no abdominal tenderness.   Musculoskeletal:      Right lower leg: Edema present.      Left lower leg: Edema present.   Skin:     General: Skin is warm and dry.   Neurological:      Mental Status: She is alert and oriented to person, place, and time.   Diagnostic Data:  Lab Results (last 24 hours)       Procedure Component Value Units Date/Time    Urinalysis With Culture If Indicated - Urine, Clean Catch [[id number]]  (Abnormal) Collected: 03/16/25 1348    Specimen: Urine, Clean Catch Updated: 03/16/25 1359 "     Color, UA Yellow     Appearance, UA Clear     pH, UA 7.0     Specific Gravity, UA 1.010     Glucose, UA Negative     Ketones, UA Negative     Bilirubin, UA Negative     Blood, UA Negative     Protein, UA Negative     Leuk Esterase, UA Trace     Nitrite, UA Negative     Urobilinogen, UA 1.0 E.U./dL    Narrative:      In absence of clinical symptoms, the presence of pyuria, bacteria, and/or nitrites on the urinalysis result does not correlate with infection.    Urinalysis, Microscopic Only - Urine, Clean Catch [[id number]] Collected: 03/16/25 1348    Specimen: Urine, Clean Catch Updated: 03/16/25 1359     RBC, UA 0-2 /HPF      WBC, UA 0-2 /HPF      Bacteria, UA None Seen /HPF      Squamous Epithelial Cells, UA 0-2 /HPF      Hyaline Casts, UA None Seen /LPF      Methodology Automated Microscopy    Urine Culture - Urine, Urine, Clean Catch [[id number]] Collected: 03/16/25 1348    Specimen: Urine, Clean Catch Updated: 03/16/25 1356    High Sensitivity Troponin T 1Hr [[id number]]  (Abnormal) Collected: 03/16/25 1320    Specimen: Blood Updated: 03/16/25 1348     HS Troponin T 22 ng/L      Troponin T Numeric Delta -2 ng/L      Troponin T % Delta -8    Narrative:      High Sensitive Troponin T Reference Range:  <14.0 ng/L- Negative Female for AMI  <22.0 ng/L- Negative Male for AMI  >=14 - Abnormal Female indicating possible myocardial injury.  >=22 - Abnormal Male indicating possible myocardial injury.   Clinicians would have to utilize clinical acumen, EKG, Troponin, and serial changes to determine if it is an Acute Myocardial Infarction or myocardial injury due to an underlying chronic condition.         Basic Metabolic Panel [[id number]]  (Abnormal) Collected: 03/16/25 1212    Specimen: Blood Updated: 03/16/25 1242     Glucose 88 mg/dL      BUN 13 mg/dL      Creatinine 0.76 mg/dL      Sodium 135 mmol/L      Potassium 4.2 mmol/L      Chloride 103 mmol/L      CO2 24.7 mmol/L      Calcium 9.2 mg/dL      BUN/Creatinine Ratio  17.1     Anion Gap 7.3 mmol/L      eGFR 88.7 mL/min/1.73     Narrative:      GFR Categories in Chronic Kidney Disease (CKD)      GFR Category          GFR (mL/min/1.73)    Interpretation  G1                     90 or greater         Normal or high (1)  G2                      60-89                Mild decrease (1)  G3a                   45-59                Mild to moderate decrease  G3b                   30-44                Moderate to severe decrease  G4                    15-29                Severe decrease  G5                    14 or less           Kidney failure          (1)In the absence of evidence of kidney disease, neither GFR category G1 or G2 fulfill the criteria for CKD.    eGFR calculation 2021 CKD-EPI creatinine equation, which does not include race as a factor    High Sensitivity Troponin T [[id number]]  (Abnormal) Collected: 03/16/25 1212    Specimen: Blood Updated: 03/16/25 1242     HS Troponin T 24 ng/L     Narrative:      High Sensitive Troponin T Reference Range:  <14.0 ng/L- Negative Female for AMI  <22.0 ng/L- Negative Male for AMI  >=14 - Abnormal Female indicating possible myocardial injury.  >=22 - Abnormal Male indicating possible myocardial injury.   Clinicians would have to utilize clinical acumen, EKG, Troponin, and serial changes to determine if it is an Acute Myocardial Infarction or myocardial injury due to an underlying chronic condition.         BNP [[id number]]  (Abnormal) Collected: 03/16/25 1212    Specimen: Blood Updated: 03/16/25 1242     proBNP 2,523.0 pg/mL     Narrative:      This assay is used as an aid in the diagnosis of individuals suspected of having heart failure. It can be used as an aid in the diagnosis of acute decompensated heart failure (ADHF) in patients presenting with signs and symptoms of ADHF to the emergency department (ED). In addition, NT-proBNP of <300 pg/mL indicates ADHF is not likely.    Age Range Result Interpretation  NT-proBNP Concentration  (pg/mL:      <50             Positive            >450                   Gray                 300-450                    Negative             <300    50-75           Positive            >900                  Gray                300-900                  Negative            <300      >75             Positive            >1800                  Gray                300-1800                  Negative            <300    Magnesium [[id number]]  (Normal) Collected: 03/16/25 1212    Specimen: Blood Updated: 03/16/25 1242     Magnesium 2.2 mg/dL     Extra Tubes [[id number]] Collected: 03/16/25 1212    Specimen: Blood, Venous Line Updated: 03/16/25 1230    Narrative:      The following orders were created for panel order Extra Tubes.  Procedure                               Abnormality         Status                     ---------                               -----------         ------                     Gold Top - SST[[id number]]                                   Final result               Light Blue Top[[id number]]                                   Final result                 Please view results for these tests on the individual orders.    Gold Top - SST [[id number]] Collected: 03/16/25 1212    Specimen: Blood Updated: 03/16/25 1230     Extra Tube Hold for add-ons.     Comment: Auto resulted.       Light Blue Top [[id number]] Collected: 03/16/25 1212    Specimen: Blood Updated: 03/16/25 1230     Extra Tube Hold for add-ons.     Comment: Auto resulted       CBC & Differential [[id number]]  (Abnormal) Collected: 03/16/25 1212    Specimen: Blood Updated: 03/16/25 1222    Narrative:      The following orders were created for panel order CBC & Differential.  Procedure                               Abnormality         Status                     ---------                               -----------         ------                     CBC Auto Differential[[id number]]        Abnormal            Final result                 Please view results  for these tests on the individual orders.    CBC Auto Differential [[id number]]  (Abnormal) Collected: 03/16/25 1212    Specimen: Blood Updated: 03/16/25 1222     WBC 3.19 10*3/mm3      RBC 3.20 10*6/mm3      Hemoglobin 8.8 g/dL      Hematocrit 29.9 %      MCV 93.4 fL      MCH 27.5 pg      MCHC 29.4 g/dL      RDW 15.4 %      RDW-SD 53.1 fl      MPV 9.8 fL      Platelets 70 10*3/mm3      Neutrophil % 80.9 %      Lymphocyte % 12.2 %      Monocyte % 5.0 %      Eosinophil % 1.3 %      Basophil % 0.3 %      Immature Grans % 0.3 %      Neutrophils, Absolute 2.58 10*3/mm3      Lymphocytes, Absolute 0.39 10*3/mm3      Monocytes, Absolute 0.16 10*3/mm3      Eosinophils, Absolute 0.04 10*3/mm3      Basophils, Absolute 0.01 10*3/mm3      Immature Grans, Absolute 0.01 10*3/mm3      nRBC 0.0 /100 WBC              Imaging Results (Last 24 Hours)       Procedure Component Value Units Date/Time    XR Chest 1 View [[id number]] Collected: 03/16/25 1229     Updated: 03/16/25 1233    Narrative:      XR CHEST 1 VW    Date of Exam: 3/16/2025 12:10 PM EDT    Indication: shortness of breath    Comparison: 1/13/2025    Findings:  Heart is enlarged. Postsurgical changes of prior sternotomy. Left-sided AICD noted. Central pulmonary vascular congestion. Negative for pneumothorax. No pleural effusion or focal consolidation. Stent overlies the proximal descending thoracic aorta and   distal arch. Lower cervical ACDF hardware partially imaged.      Impression:      Impression:  Cardiomegaly and central pulmonary vascular congestion.            Electronically Signed: Westely Kirkpatrick MD    3/16/2025 12:31 PM EDT    Workstation ID: [id number]              Assessment & Plan        This is a 64 y.o. female with:    Active and Resolved Problems  Active Hospital Problems    Diagnosis  POA    **Dyspnea [R06.00]  Yes      Resolved Hospital Problems   No resolved problems to display.       CHF exacerbation  -Troponins 24, 22  -Delta -2  -BNP 2,523.0  -GREGORY  2/21/2025 LVSF low normal.  EF 51 to 55%.  Mildly right reduced ventricular systolic function.  Positive PFO.  RV seen mildly dilated.  The LAC is mildly dilated.  -EKG: Ventricular paced rhythm.  , QTc 474  -CXR: Cardiomegaly and central pulmonary vascular congestion  -TTE ordered  -Lasix 40 mg twice daily ordered  -Strict I's and O's  -Daily weight  -Cardiology consult (patient of Dr. Lopez)      History breast cancer with bone metastasis  Thrombocytopenia  -Was on Arimidex in the past was discontinued due to osteoporosis  -Had intolerance to tamoxifen  -She is currently on Abemeciclib outpatient, this has been considered to have alternate therapies given frequent UTIs as per pharmacy note on 12/10  -Patient can follow outpatient with her primary oncologist Dr. Nunez to discuss alternative therapies  -Monitor daily CBCs, monitor platelets  -Continue Norco  mg q 4 PRN for moderate pain  -Continue with intrathecal pump     Diabetes mellitus  -Accu-Cheks before meals and at bedtime  -SSI  -Healthy heart carb conscious diet      VTE Prophylaxis:  Pharmacologic VTE prophylaxis orders are present.        The patient desires to be as follows:    CODE STATUS:    Code Status (Patient has no pulse and is not breathing): CPR (Attempt to Resuscitate)  Medical Interventions (Patient has pulse or is breathing): Full Support        Lavon Garrison, who can be contacted at [phone], is the designated person to make medical decisions on the patient's behalf if She is incapable of doing so. This was clarified with patient and/or next of kin on 3/16/2025 during the course of this H&P.    Admission Status:  I believe this patient meets patient status.    Expected Length of Stay: 2    PDMP and Medication Dispenses via Sidebar reviewed and consistent with patient reported medications.    I discussed the patient's findings and my recommendations with patient.      Signature:     This document has been electronically signed by  Lena Hoover, APRN on March 16, 2025 14:42 EDT   [facility] Hospitalist Team

## 2025-03-16 NOTE — Clinical Note
Level of Care: Telemetry [5]   Admitting Physician: MEGHA DOHERTY [[id number]]   Attending Physician: MEGHA DOHERTY [[id number]]

## 2025-03-16 NOTE — LETTER
March 18, 2025     Patient: Gladys Garrison   YOB: 1960   Date of Visit: 3/16/2025       To Whom It May Concern:    It is my medical opinion that Gladys Garrison may return to work on 3/19/2024 .           Sincerely,        Yahaira Helm RN     CC: No Recipients

## 2025-03-17 ENCOUNTER — APPOINTMENT (OUTPATIENT)
Dept: CARDIOLOGY | Facility: HOSPITAL | Age: 65
End: 2025-03-17
Payer: MEDICARE

## 2025-03-17 LAB
ALBUMIN SERPL-MCNC: 3.7 G/DL (ref 3.5–5.2)
ALBUMIN/GLOB SERPL: 1.2 G/DL
ALP SERPL-CCNC: 63 U/L (ref 39–117)
ALT SERPL W P-5'-P-CCNC: 9 U/L (ref 1–33)
ANION GAP SERPL CALCULATED.3IONS-SCNC: 7.3 MMOL/L (ref 5–15)
AST SERPL-CCNC: 23 U/L (ref 1–32)
BACTERIA SPEC AEROBE CULT: NORMAL
BASOPHILS # BLD AUTO: 0 10*3/MM3 (ref 0–0.2)
BASOPHILS NFR BLD AUTO: 0 % (ref 0–1.5)
BILIRUB SERPL-MCNC: 0.4 MG/DL (ref 0–1.2)
BUN SERPL-MCNC: 18 MG/DL (ref 8–23)
BUN/CREAT SERPL: 15.8 (ref 7–25)
CALCIUM SPEC-SCNC: 9 MG/DL (ref 8.6–10.5)
CHLORIDE SERPL-SCNC: 102 MMOL/L (ref 98–107)
CO2 SERPL-SCNC: 29.7 MMOL/L (ref 22–29)
CREAT SERPL-MCNC: 1.14 MG/DL (ref 0.57–1)
DEPRECATED RDW RBC AUTO: 52.6 FL (ref 37–54)
EGFRCR SERPLBLD CKD-EPI 2021: 54.5 ML/MIN/1.73
EOSINOPHIL # BLD AUTO: 0.02 10*3/MM3 (ref 0–0.4)
EOSINOPHIL NFR BLD AUTO: 1 % (ref 0.3–6.2)
ERYTHROCYTE [DISTWIDTH] IN BLOOD BY AUTOMATED COUNT: 15.4 % (ref 12.3–15.4)
GLOBULIN UR ELPH-MCNC: 3.1 GM/DL
GLUCOSE BLDC GLUCOMTR-MCNC: 146 MG/DL (ref 70–105)
GLUCOSE BLDC GLUCOMTR-MCNC: 147 MG/DL (ref 70–105)
GLUCOSE BLDC GLUCOMTR-MCNC: 162 MG/DL (ref 70–105)
GLUCOSE BLDC GLUCOMTR-MCNC: 97 MG/DL (ref 70–105)
GLUCOSE BLDC GLUCOMTR-MCNC: 98 MG/DL (ref 70–105)
GLUCOSE SERPL-MCNC: 88 MG/DL (ref 65–99)
HCT VFR BLD AUTO: 26.3 % (ref 34–46.6)
HGB BLD-MCNC: 7.9 G/DL (ref 12–15.9)
IMM GRANULOCYTES # BLD AUTO: 0.01 10*3/MM3 (ref 0–0.05)
IMM GRANULOCYTES NFR BLD AUTO: 0.5 % (ref 0–0.5)
LYMPHOCYTES # BLD AUTO: 0.44 10*3/MM3 (ref 0.7–3.1)
LYMPHOCYTES NFR BLD AUTO: 21.6 % (ref 19.6–45.3)
MCH RBC QN AUTO: 27.8 PG (ref 26.6–33)
MCHC RBC AUTO-ENTMCNC: 30 G/DL (ref 31.5–35.7)
MCV RBC AUTO: 92.6 FL (ref 79–97)
MONOCYTES # BLD AUTO: 0.17 10*3/MM3 (ref 0.1–0.9)
MONOCYTES NFR BLD AUTO: 8.3 % (ref 5–12)
NEUTROPHILS NFR BLD AUTO: 1.4 10*3/MM3 (ref 1.7–7)
NEUTROPHILS NFR BLD AUTO: 68.6 % (ref 42.7–76)
NRBC BLD AUTO-RTO: 0 /100 WBC (ref 0–0.2)
PLATELET # BLD AUTO: 60 10*3/MM3 (ref 140–450)
PMV BLD AUTO: 9.8 FL (ref 6–12)
POTASSIUM SERPL-SCNC: 4 MMOL/L (ref 3.5–5.2)
PROT SERPL-MCNC: 6.8 G/DL (ref 6–8.5)
QT INTERVAL: 474 MS
QTC INTERVAL: 474 MS
RBC # BLD AUTO: 2.84 10*6/MM3 (ref 3.77–5.28)
SODIUM SERPL-SCNC: 139 MMOL/L (ref 136–145)
WBC NRBC COR # BLD AUTO: 2.04 10*3/MM3 (ref 3.4–10.8)

## 2025-03-17 PROCEDURE — 93325 DOPPLER ECHO COLOR FLOW MAPG: CPT | Performed by: INTERNAL MEDICINE

## 2025-03-17 PROCEDURE — 85025 COMPLETE CBC W/AUTO DIFF WBC: CPT | Performed by: NURSE PRACTITIONER

## 2025-03-17 PROCEDURE — 93321 DOPPLER ECHO F-UP/LMTD STD: CPT | Performed by: INTERNAL MEDICINE

## 2025-03-17 PROCEDURE — 80053 COMPREHEN METABOLIC PANEL: CPT | Performed by: NURSE PRACTITIONER

## 2025-03-17 PROCEDURE — 93321 DOPPLER ECHO F-UP/LMTD STD: CPT

## 2025-03-17 PROCEDURE — 25010000002 ENOXAPARIN PER 10 MG: Performed by: NURSE PRACTITIONER

## 2025-03-17 PROCEDURE — 82948 REAGENT STRIP/BLOOD GLUCOSE: CPT | Performed by: NURSE PRACTITIONER

## 2025-03-17 PROCEDURE — 82948 REAGENT STRIP/BLOOD GLUCOSE: CPT

## 2025-03-17 PROCEDURE — 93308 TTE F-UP OR LMTD: CPT

## 2025-03-17 PROCEDURE — 93325 DOPPLER ECHO COLOR FLOW MAPG: CPT

## 2025-03-17 PROCEDURE — 93308 TTE F-UP OR LMTD: CPT | Performed by: INTERNAL MEDICINE

## 2025-03-17 PROCEDURE — 63710000001 INSULIN LISPRO (HUMAN) PER 5 UNITS: Performed by: NURSE PRACTITIONER

## 2025-03-17 RX ORDER — FUROSEMIDE 20 MG/1
20 TABLET ORAL
Status: DISCONTINUED | OUTPATIENT
Start: 2025-03-17 | End: 2025-03-18 | Stop reason: HOSPADM

## 2025-03-17 RX ADMIN — ENOXAPARIN SODIUM 30 MG: 100 INJECTION SUBCUTANEOUS at 18:01

## 2025-03-17 RX ADMIN — FUROSEMIDE 20 MG: 20 TABLET ORAL at 18:01

## 2025-03-17 RX ADMIN — Medication 10 ML: at 10:02

## 2025-03-17 RX ADMIN — Medication 10 ML: at 21:20

## 2025-03-17 RX ADMIN — INSULIN LISPRO 2 UNITS: 100 INJECTION, SOLUTION INTRAVENOUS; SUBCUTANEOUS at 20:47

## 2025-03-17 RX ADMIN — FUROSEMIDE 20 MG: 20 TABLET ORAL at 10:57

## 2025-03-17 NOTE — PLAN OF CARE
Problem: Adult Inpatient Plan of Care  Goal: Absence of Hospital-Acquired Illness or Injury  Intervention: Identify and Manage Fall Risk  Intervention: Prevent Skin Injury  Intervention: Prevent and Manage VTE (Venous Thromboembolism) Risk  Goal: Optimal Comfort and Wellbeing  Intervention: Provide Person-Centered Care   Goal Outcome Evaluation:         Patient A&O had an  ECHO today. Call light and bedside table within reach.

## 2025-03-17 NOTE — CASE MANAGEMENT/SOCIAL WORK
Discharge Planning Assessment   [place]     Patient Name: Gladys Garrison  MRN: [id number]  Today's Date: 3/17/2025    Admit Date: 3/16/2025    Plan: Return home with family.   Discharge Needs Assessment       Row Name 03/17/25 1032       Living Environment    People in Home significant other;other relative(s)    Name(s) of People in Home ALEX Jacob, CAROL ANN Abreu, and nephew    Current Living Arrangements home    Potentially Unsafe Housing Conditions none    In the past 12 months has the electric, gas, oil, or water company threatened to shut off services in your home? No    Primary Care Provided by self    Provides Primary Care For no one    Family Caregiver if Needed significant other    Family Caregiver Names CAROL ANN Abreu    Quality of Family Relationships helpful;involved;supportive    Able to Return to Prior Arrangements yes       Resource/Environmental Concerns    Resource/Environmental Concerns none    Transportation Concerns none       Transportation Needs    In the past 12 months, has lack of transportation kept you from medical appointments or from getting medications? no    In the past 12 months, has lack of transportation kept you from meetings, work, or from getting things needed for daily living? No       Food Insecurity    Within the past 12 months, you worried that your food would run out before you got the money to buy more. Never true    Within the past 12 months, the food you bought just didn't last and you didn't have money to get more. Never true       Transition Planning    Patient/Family Anticipates Transition to home with family    Patient/Family Anticipated Services at Transition none    Transportation Anticipated family or friend will provide       Discharge Needs Assessment    Readmission Within the Last 30 Days no previous admission in last 30 days    Equipment Currently Used at Home glucometer    Concerns to be Addressed denies needs/concerns at this time;no discharge needs identified     Anticipated Changes Related to Illness none    Equipment Needed After Discharge none                   Discharge Plan       Row Name 03/17/25 1034       Plan    Plan Return home with family.    Patient/Family in Agreement with Plan yes    Plan Comments CM met with pt at bedside to discuss discharge needs. Pt lives at [facility] home, nephew and SO Brady, doesn't drive and is IADLs. PCP and pharmacy verified- pt declined MTB. No issues stated affording food/ medications or transport, and home environment is safe. Current DME: glucometer. No current HHC and none anticipated on discharge. Family will provide transportation home.                Demographic Summary       Row Name 03/17/25 1031       General Information    Admission Type inpatient    Arrived From emergency department    Required Notices Provided Important Message from Medicare    Referral Source admission list    Reason for Consult discharge planning    Preferred Language English       Contact Information    Permission Granted to Share Info With                    Functional Status       Row Name 03/17/25 1031       Functional Status    Usual Activity Tolerance moderate    Current Activity Tolerance fair       Functional Status, IADL    Medications independent    Meal Preparation independent    Housekeeping independent    Laundry independent    Shopping assistive person       Mental Status    General Appearance WDL WDL       Mental Status Summary    Recent Changes in Mental Status/Cognitive Functioning no changes       Employment/    Current or Previous Occupation not applicable                  Patient Forms       Row Name 03/17/25 1031       Patient Forms    Important Message from Medicare (IMM) Delivered  IMM 3/17 per CM    Delivered to Patient    Method of delivery In person                      Kim Croft RN     Office phone: [phone]  Office fax: [phone]

## 2025-03-17 NOTE — PROGRESS NOTES
[facility] MED[facility]  DAILY PROGRESS NOTE    NAME: Gladys Garrison  : 1960  MRN: [id number]      LOS: 1 day     PROVIDER OF SERVICE: Canelo Unger MD    Chief Complaint: Dyspnea    Subjective:     Interval History:  History taken from: patient      History of Present Illness: Gladys Garrison is a 64 y.o. female with a CMH of breast cancer, tetralogy of Fallot, systolic CHF, pacemaker, HTN, who presented to [facility] on 3/16/2025 with dyspnea patient states that she has been having complaints of shortness of progressive shortness of breath over the last several weeks.  Of note patient was recently admitted.2025-2025 for sepsis, group B bacteremia with ICD lead vegetation.  Patient states that she has been typically shortness of breath since discharge, this is progressively became worse, patient also with dyspnea on exertion.  Also with complaints of bilateral lower extremity edema.  Patient was evaluated by her primary care doctor within the last 2 weeks, who recommended patient to come to the emergency department for further evaluation.  At this time patient denies any chest pain, fever, cough, congestion      3/17 patient seen and examined in bed no acute distress, vital signs stable, WBC 2.0 hemoglobin 7.9.  Discussed with RN. Edema resolved      Review of Systems  Constitutional:  Negative for fever.   HENT:  Negative for congestion.    Respiratory:  Positive for shortness of breath. Negative for cough.    Cardiovascular:  Positive for leg swelling. Negative for chest pain.   Gastrointestinal:  Negative for abdominal pain and vomiting.   Musculoskeletal:  Negative for back pain.   Neurological:  Negative for headaches.   Psychiatric/Behavioral:  Negative for confusion.    Objective:     Vital Signs  Temp:  [97.9 °F (36.6 °C)-98.7 °F (37.1 °C)] 98.1 °F (36.7 °C)  Heart Rate:  [59-63] 60  Resp:  [12-20] 16  BP: (116-174)/(40-77) 124/48   Body mass index is 23.47  kg/m².    Physical Exam  Vitals and nursing note reviewed.   Constitutional:       Appearance: Normal appearance.   HENT:      Head: Normocephalic and atraumatic.      Mouth/Throat:      Mouth: Mucous membranes are moist.   Cardiovascular:      Rate and Rhythm: Normal rate and regular rhythm.  Pacemaker present  Pulmonary:      Effort: Pulmonary effort is normal. No respiratory distress.      Breath sounds: Normal breath sounds.   Abdominal:      Palpations: Abdomen is soft.      Tenderness: There is no abdominal tenderness.   Musculoskeletal:      Right lower leg: Edema present.      Left lower leg: Edema present.   Skin:     General: Skin is warm and dry.   Neurological:      Mental Status: She is alert and oriented to person, place, and time.      Diagnostic Data    Results from last 7 days   Lab Units 03/17/25  0247   WBC 10*3/mm3 2.04*   HEMOGLOBIN g/dL 7.9*   HEMATOCRIT % 26.3*   PLATELETS 10*3/mm3 60*   GLUCOSE mg/dL 88   CREATININE mg/dL 1.14*   BUN mg/dL 18   SODIUM mmol/L 139   POTASSIUM mmol/L 4.0   AST (SGOT) U/L 23   ALT (SGPT) U/L 9   ALK PHOS U/L 63   BILIRUBIN mg/dL 0.4   ANION GAP mmol/L 7.3       XR Chest 1 View  Result Date: 3/16/2025  Impression: Cardiomegaly and central pulmonary vascular congestion. Electronically Signed: Westley Kirkpatrick MD  3/16/2025 12:31 PM EDT  Workstation ID: [id number]        I reviewed the patient's new clinical results.  Scheduled Meds:enoxaparin sodium, 30 mg, Subcutaneous, Daily  furosemide, 40 mg, Intravenous, BID Diuretics  insulin lispro, 2-7 Units, Subcutaneous, 4x Daily AC & at Bedtime  sodium chloride, 10 mL, Intravenous, Q12H      Continuous Infusions:   PRN Meds:.  acetaminophen **OR** acetaminophen **OR** acetaminophen    senna-docusate sodium **AND** polyethylene glycol **AND** bisacodyl **AND** bisacodyl    Calcium Replacement - Follow Nurse / BPA Driven Protocol    dextrose    dextrose    glucagon (human recombinant)    HYDROcodone-acetaminophen    Magnesium  Standard Dose Replacement - Follow Nurse / BPA Driven Protocol    Phosphorus Replacement - Follow Nurse / BPA Driven Protocol    Potassium Replacement - Follow Nurse / BPA Driven Protocol    [COMPLETED] Insert Peripheral IV **AND** sodium chloride    sodium chloride    sodium chloride   Assessment/Plan:     Active and Resolved Problems  Active Hospital Problems    Diagnosis  POA    **Dyspnea [R06.00]  Yes      Resolved Hospital Problems   No resolved problems to display.     CHF exacerbation  -Troponins 24, 22  -Delta -2  -BNP 2,523.0  -GREGORY 2/21/2025 LVSF low normal.  EF 51 to 55%.  Mildly right reduced ventricular systolic function.  Positive PFO.  RV seen mildly dilated.  The LAC is mildly dilated.  -EKG: Ventricular paced rhythm.  , QTc 474  -CXR: Cardiomegaly and central pulmonary vascular congestion  -TTE ordered  -Lasix 20 mg twice daily ordered  -Strict I's and O's  -Daily weight  -Cardiology consult (patient of Dr. Lopez)     History breast cancer with bone metastasis  Thrombocytopenia  -Was on Arimidex in the past was discontinued due to osteoporosis  -Had intolerance to tamoxifen  -She is currently on Abemeciclib outpatient, this has been considered to have alternate therapies given frequent UTIs as per pharmacy note on 12/10  -Patient can follow outpatient with her primary oncologist Dr. Nunez to discuss alternative therapies  -Monitor daily CBCs, monitor platelets  -Continue Norco  mg q 4 PRN for moderate pain  -Continue with intrathecal pump     Diabetes mellitus  -Accu-Cheks before meals and at bedtime  -SSI  -Healthy heart carb conscious diet    VTE Prophylaxis:  Pharmacologic VTE prophylaxis orders are present.      Disposition Planning:   Barriers to Discharge:chf  Anticipated Date of Discharge: 3/18  Place of Discharge: home  Time: 34 minutes     Code Status and Medical Interventions: CPR (Attempt to Resuscitate); Full Support   Ordered at: 03/16/25 4694     Code Status (Patient has no  pulse and is not breathing):    CPR (Attempt to Resuscitate)     Medical Interventions (Patient has pulse or is breathing):    Full Support       Signature: Electronically signed by Canelo Unger MD, 03/17/25, 09:03 EDT.  [facility] Hospitalist Team

## 2025-03-17 NOTE — PLAN OF CARE
Goal Outcome Evaluation:  Plan of Care Reviewed With: patient           Outcome Evaluation: Pt is currently resting in bed. BLE swelling is improving. Call light is within reach. No concerns or questions at this time.

## 2025-03-17 NOTE — CONSULTS
"Card[facility]    Subjective:     Encounter Date:03/16/2025      Patient ID: Gladys Garrison is a 64 y.o. female     Referring Physician: Ute    Chief Complaint: Progressive shortness of breath    Reason for Consult: CHF    HPI:  Gladys Garrison is a 64 y.o. female patient of Dr Lopez.  She has a history of diastolic heart failure, reduced RV systolic function, tetralogy of Fallot, hypertension, hyperlipidemia, pulmonary hypertension/pulmonary valve disorder, tricuspid valve disorder, SVT, SSS/complete AV block/pacemaker, syncope, breast cancer. Patient presented to [facility] ED 3/16/2025 with complaints of progressive shortness of breath over the past 2 weeks.    In ED, chest x-ray showed cardiomegaly with mild evidence of vascular congestion.  EKG done with no obvious ischemia.  High-sensitivity troponin x 2: 24, 22.  proBNP 2500.  Hemoglobin 8.8 which appears to be at baseline.  Sodium 135, CO2 24.7, potassium 4.2, creatinine 0.76     Per past records \" Pmh includes hypertension, hyperlipidemia, pulm hypertension, history of congenital heart disease with tetralogy of Fallot with surgically repaired VSD, complete heart block with dual-chamber pacemaker, type 2 diabetes, valvular heart disease in conjunction with tetralogy with both pulmonic and tricuspid valve abnormalities. She underwent device exchange with Medtronic generator December 2023 (Dr Hammond), Medtronic leads were confirmed for RV and RA leads which were placed back in 2007.  She also has metastatic breast cancer diagnosed 2017 with bone mets diagnosed in 2021, history of bilateral mastectomy.  Under therapy evaluation also pain management from oncology standpoint.   She also history of COVID 2021 she was admitted recently in February 2023 with chest pain atypical nature thought to be secondary to metastatic disease.   She has no history of obstructive CAD or coronary intervention.    Last 2D echo March 2023 revealed normal EF 55 to 60% " "with grade 1 diastolic dysfunction, severely reduced RV systolic function with moderately dilated RV and RA, moderate bileaflet mitral valve thickening with severe TR, mildly elevated pulmonary pressures 35-45, trivial pericardial effusion\".       She had recent admission to [facility] with nausea vomiting and weakness on 1/5/2025.  She was found to have group B streptococcus bacteremia of unknown etiology, possibly UTI.  ID requested GREGORY, which showed no valvular vegetation but questionable thrombus versus vegetation on 1 of PPM leads.  She was treated with 6 weeks of IV antibiotics with plan for repeat GREGORY at 6-week tomás (April 1, 2025).      Past Medical History:   Diagnosis Date    Allergic     Bone cancer     METASTATIC BONE; stage 4    Bradycardia     SECONDARY TO ABLATION    Breast cancer 2017    mets to lymph nodes; did not do radiation    Cancer of unknown origin     Compression fx, thoracic spine, open, initial encounter     Coronary artery disease     COVID-19 09/01/2021    Diabetes mellitus     Heart defect, congenital 1960    tratology of flow (spelling?)    Heart murmur     Hepatitis C     RESOLVED WITH MEDICATION    Hyperlipidemia     Hypertension     Leaky heart valve     tricuspid & mitral valves leak; not yet enough to warrent another open heart surgery    Obesity (BMI 30-39.9) 02/05/2021    Pacemaker     dependent, because of ablations to treat tachycardia    Rib fracture     Per patient    Sepsis 01/2025    Sleep apnea     no machine    Tachycardia     ATRIAL    Type 2 diabetes mellitus 11/2017       Past Surgical History:   Procedure Laterality Date    BACK SURGERY      neck X 2    BREAST RECONSTRUCTION Bilateral     CARDIAC ABLATION       atrial tachycardia x 5 ablations     CARDIAC CATHETERIZATION      CARDIAC ELECTROPHYSIOLOGY PROCEDURE N/A 12/11/2023    Procedure: Pacemaker RV lead insertion with generator change out. Medtronic;  Surgeon: Steven Hammond MD;  Location: [facility] CATH INVASIVE " LOCATION;  Service: Cardiovascular;  Laterality: N/A;    CARDIAC SURGERY      stent placed in aorta    CARDIAC SURGERY      6 surgeries as baby     CERVICAL FUSION ANTERIOR WITH ARTIFICIAL DISCECTOMY IMPLANTATION N/A 2020    Procedure: C4 VERTEBRECTOMY AND ANTERIOR CERIVCAL DISCECTOMY WITH FUSION OF CERVICAL THREE THROUGH FIVE WITH REMOVAL OF HARDWARE C5-C6;  Surgeon: Mark Kaba MD;  Location: [facility] MAIN OR;  Service: Neurosurgery;  Laterality: N/A;     SECTION      x2    COLONOSCOPY N/A 10/23/2020    Procedure: COLONOSCOPY WITH POLYPECTOMY X6;  Surgeon: Stanley Bourne MD;  Location: [facility] ENDOSCOPY;  Service: Gastroenterology;  Laterality: N/A;  POLYPS, INTERNAL HEMORRHOIDS    ENDOMETRIAL ABLATION      REMOVAL SCAR TISSUE UTERINE    ENDOSCOPY N/A 10/23/2020    Procedure: ESOPHAGOGASTRODUODENOSCOPY WITH BIOPSY X 1 AREA;  Surgeon: Stanley Bourne MD;  Location: [facility] ENDOSCOPY;  Service: Gastroenterology;  Laterality: N/A;  GASTRITIS, ESOPHAGITIS, HIATAL HERNIA    INSERT / REPLACE / REMOVE PACEMAKER      KNEE SURGERY      MASTECTOMY Bilateral     NECK SURGERY      PACEMAKER IMPLANTATION      Medtronic    PAIN PUMP INSERTION/REVISION N/A 2022    Procedure: PAIN PUMP INSERTION AND INTRATHECAL CATHETER PLACEMENT;  Surgeon: Chuck Hunter MD;  Location: [facility] MAIN OR;  Service: Pain Management;  Laterality: N/A;       Family History   Problem Relation Age of Onset    Heart disease Mother     Stroke Mother     Lung cancer Mother     Aneurysm Father     Diabetes Sister     No Known Problems Brother     No Known Problems Brother     Diabetes type I Half-Sister     Thyroid cancer Half-Sister     Cancer Maternal Aunt     Heart attack Sister     Thyroid disease Sister     No Known Problems Sister        Social History     Socioeconomic History    Marital status:     Number of children: 2   Tobacco Use    Smoking status: Never     Passive exposure: Never     "Smokeless tobacco: Never   Vaping Use    Vaping status: Never Used   Substance and Sexual Activity    Alcohol use: Yes     Comment: drinks rarely    Drug use: Never    Sexual activity: Defer         Review of Systems   Constitutional: Negative for diaphoresis.   Cardiovascular:  Positive for dyspnea on exertion. Negative for chest pain, claudication, cyanosis, irregular heartbeat, leg swelling, near-syncope, orthopnea, palpitations, paroxysmal nocturnal dyspnea and syncope.        Leg edema resolved   Respiratory:  Negative for sleep disturbances due to breathing.    Hematologic/Lymphatic: Negative for bleeding problem.   Gastrointestinal:  Negative for nausea and vomiting.   Genitourinary:  Positive for dysuria.   Neurological:  Negative for dizziness, focal weakness, headaches, light-headedness, loss of balance, sensory change and vertigo.   Psychiatric/Behavioral:  Negative for altered mental status.    All other systems reviewed and are negative.           Objective:         /59 (BP Location: Right arm, Patient Position: Lying)   Pulse 59   Temp 98 °F (36.7 °C) (Oral)   Resp 16   Ht 152.4 cm (60\")   Wt 54.5 kg (120 lb 2.4 oz)   LMP  (LMP Unknown)   SpO2 99%   BMI 23.47 kg/m²     Physical Exam:  Physical Exam    General Appearance:    Alert, cooperative, in no acute distress   Head:    Normocephalic, without obvious abnormality, atraumatic   Eyes:            PERRLA, EOM intact, conjunctivae and sclerae normal, no  icterus       Throat:   Oral mucosa moist, No oral lesions, no thrush   Neck:   No carotid bruit, no JVD, supple, trachea midline, no thyromegaly    Lungs:     Clear to auscultation,respirations regular, even and   unlabored, RA    Heart:    Regular rhythm and normal rate, normal S1 and S2,  no gallop, no rub, no click   Chest Wall:    No abnormalities observed   Abdomen:     Soft,  non-tender, non-distended, no guarding, no rebound  tenderness   Extremities:   No edema, no cyanosis or " "redness   Pulses:   Pulses palpable and equal bilaterally in all extremities   Skin:   No bleeding, bruising or rash       Neurologic:   Normal mood, thought content and behavior           ASCVD Risk Score::  The 10-year ASCVD risk score (Kyle SHANKAR, et al., 2019) is: 10.1%    Values used to calculate the score:      Age: 64 years      Sex: Female      Is Non- : No      Diabetic: Yes      Tobacco smoker: No      Systolic Blood Pressure: 131 mmHg      Is BP treated: Yes      HDL Cholesterol: 51 mg/dL      Total Cholesterol: 171 mg/dL      Lab Review:     Results from last 7 days   Lab Units 03/17/25  0247 03/16/25  1212   SODIUM mmol/L 139 135*   POTASSIUM mmol/L 4.0 4.2   CHLORIDE mmol/L 102 103   CO2 mmol/L 29.7* 24.7   BUN mg/dL 18 13   CREATININE mg/dL 1.14* 0.76   GLUCOSE mg/dL 88 88   CALCIUM mg/dL 9.0 9.2   AST (SGOT) U/L 23  --    ALT (SGPT) U/L 9  --      Results from last 7 days   Lab Units 03/16/25  1320 03/16/25  1212   HSTROP T ng/L 22* 24*     Results from last 7 days   Lab Units 03/17/25  0247 03/16/25  1212   WBC 10*3/mm3 2.04* 3.19*   HEMOGLOBIN g/dL 7.9* 8.8*   HEMATOCRIT % 26.3* 29.9*   PLATELETS 10*3/mm3 60* 70*         Results from last 7 days   Lab Units 03/16/25  1212   MAGNESIUM mg/dL 2.2           Invalid input(s): \"LDLCALC\"  Results from last 7 days   Lab Units 03/16/25  1212   PROBNP pg/mL 2,523.0*           Recent Radiology:  Imaging Results (Most Recent)       Procedure Component Value Units Date/Time    XR Chest 1 View [[id number]] Collected: 03/16/25 1229     Updated: 03/16/25 1233    Narrative:      XR CHEST 1 VW    Date of Exam: 3/16/2025 12:10 PM EDT    Indication: shortness of breath    Comparison: 1/13/2025    Findings:  Heart is enlarged. Postsurgical changes of prior sternotomy. Left-sided AICD noted. Central pulmonary vascular congestion. Negative for pneumothorax. No pleural effusion or focal consolidation. Stent overlies the proximal descending thoracic " aorta and   distal arch. Lower cervical ACDF hardware partially imaged.      Impression:      Impression:  Cardiomegaly and central pulmonary vascular congestion.            Electronically Signed: Westley Kirkpatrick MD    3/16/2025 12:31 PM EDT    Workstation ID: [id number]              ECHOCARDIOGRAM:  Results for orders placed during the hospital encounter of 02/21/25    Adult Transesophageal Echo (GREGORY) W/ Cont if Necessary Per Protocol    Interpretation Summary    Left ventricular systolic function is low normal. Left ventricular ejection fraction appears to be 51 - 55%.    Mildly reduced right ventricular systolic function noted.    The right ventricular cavity is moderately dilated.    The left atrial cavity is moderately dilated.    Moderate to severe tricuspid valve regurgitation is present.    Saline test results are positive. PFO is present.    No obvious source of vegetation noted on the valves or pace lead. There is small echodensity near the RV lead and the VSD patch but not independently mobile and possibly representing fibrous tissue. Clinical correlation recommended.    Electronically signed by Clarence Dickerson MD, 02/21/25, 4:16 PM EST.      Stress Test:        Cardiac Catheterization:  No results found for this or any previous visit.      Results Review:  I have personally reviewed the results from the time of this admission to 3/17/2025 16:26 EDT and agree with these findings:  []  Laboratory  []  Microbiology  []  Radiology  []  EKG/Telemetry   []  Cardiology/Vascular   []  Pathology  []  Old records  []  Other:    Most notable findings include:     Allergies   Allergen Reactions    Promethazine Other (See Comments)     Hyperactive mean    Tape Other (See Comments)     .blisters      Adhesive Tape Rash    Flexeril [Cyclobenzaprine] Rash       Current Medications:   Scheduled Meds:enoxaparin sodium, 30 mg, Subcutaneous, Daily  furosemide, 20 mg, Oral, BID Diuretics  insulin lispro,  2-7 Units, Subcutaneous, 4x Daily AC & at Bedtime  sodium chloride, 10 mL, Intravenous, Q12H      Continuous Infusions:         Assessment:         Active Hospital Problems    Diagnosis  POA    **Dyspnea [R06.00]  Yes   diastolic heart failure   reduced RV systolic function   Tetrology of Fallot   Hypertension  Pulmonary HTN  VHD-pulmonic and tricuspid  Hx SVT  SSS/complete heart block/presence PPM       Plan:   Labs today: Na+ 139, CO2 29.7, K+ 4.0, Cr 1.14 (up from 0.76 yesterday), HGB 7.9   No I& O done today-need strict I&O and daily weights to eval diuresis  Lasix 20mg po bid  BP stable  Await result of repeat TTE today  GREGORY should be repeated 4/1 for bacteremia on last admit       Nikkie Rasmussen, CIRO  03/17/25  16:26 EDT

## 2025-03-18 ENCOUNTER — READMISSION MANAGEMENT (OUTPATIENT)
Dept: CALL CENTER | Facility: HOSPITAL | Age: 65
End: 2025-03-18
Payer: MEDICARE

## 2025-03-18 ENCOUNTER — TELEPHONE (OUTPATIENT)
Dept: CARDIOLOGY | Facility: CLINIC | Age: 65
End: 2025-03-18
Payer: MEDICARE

## 2025-03-18 VITALS
TEMPERATURE: 97.6 F | RESPIRATION RATE: 17 BRPM | DIASTOLIC BLOOD PRESSURE: 48 MMHG | HEART RATE: 61 BPM | BODY MASS INDEX: 22.15 KG/M2 | WEIGHT: 112.8 LBS | SYSTOLIC BLOOD PRESSURE: 120 MMHG | HEIGHT: 60 IN | OXYGEN SATURATION: 98 %

## 2025-03-18 LAB
ALBUMIN SERPL-MCNC: 3.9 G/DL (ref 3.5–5.2)
ALBUMIN/GLOB SERPL: 1.1 G/DL
ALP SERPL-CCNC: 65 U/L (ref 39–117)
ALT SERPL W P-5'-P-CCNC: 8 U/L (ref 1–33)
ANION GAP SERPL CALCULATED.3IONS-SCNC: 9.4 MMOL/L (ref 5–15)
AST SERPL-CCNC: 26 U/L (ref 1–32)
AV MEAN PRESS GRAD SYS DOP V1V2: 5.8 MMHG
AV VMAX SYS DOP: 166.8 CM/SEC
BH CV ECHO MEAS - AO MAX PG: 11.1 MMHG
BH CV ECHO MEAS - AO V2 VTI: 41.1 CM
BH CV ECHO MEAS - EDV(CUBED): 79.5 ML
BH CV ECHO MEAS - ESV(CUBED): 37.4 ML
BH CV ECHO MEAS - FS: 22.3 %
BH CV ECHO MEAS - IVS/LVPW: 1.02 CM
BH CV ECHO MEAS - IVSD: 1.42 CM
BH CV ECHO MEAS - LA DIMENSION: 4.1 CM
BH CV ECHO MEAS - LV MASS(C)D: 233 GRAMS
BH CV ECHO MEAS - LVIDD: 4.3 CM
BH CV ECHO MEAS - LVIDS: 3.3 CM
BH CV ECHO MEAS - LVOT AREA: 2.9 CM2
BH CV ECHO MEAS - LVOT DIAM: 1.92 CM
BH CV ECHO MEAS - LVPWD: 1.39 CM
BH CV ECHO MEAS - PA ACC TIME: 0.18 SEC
BH CV ECHO MEAS - PA V2 MAX: 192.5 CM/SEC
BH CV ECHO MEAS - RAP SYSTOLE: 15 MMHG
BH CV ECHO MEAS - RV MAX PG: 1.66 MMHG
BH CV ECHO MEAS - RV V1 MAX: 64.5 CM/SEC
BH CV ECHO MEAS - RV V1 VTI: 16.2 CM
BH CV ECHO MEAS - RVSP: 53.3 MMHG
BH CV ECHO MEAS - TAPSE (>1.6): 1.47 CM
BH CV ECHO MEAS - TR MAX PG: 38.3 MMHG
BH CV ECHO MEAS - TR MAX VEL: 309.4 CM/SEC
BILIRUB SERPL-MCNC: 0.4 MG/DL (ref 0–1.2)
BUN SERPL-MCNC: 18 MG/DL (ref 8–23)
BUN/CREAT SERPL: 23.1 (ref 7–25)
CALCIUM SPEC-SCNC: 9.3 MG/DL (ref 8.6–10.5)
CHLORIDE SERPL-SCNC: 102 MMOL/L (ref 98–107)
CO2 SERPL-SCNC: 24.6 MMOL/L (ref 22–29)
CREAT SERPL-MCNC: 0.78 MG/DL (ref 0.57–1)
DEPRECATED RDW RBC AUTO: 51.8 FL (ref 37–54)
EGFRCR SERPLBLD CKD-EPI 2021: 86 ML/MIN/1.73
ERYTHROCYTE [DISTWIDTH] IN BLOOD BY AUTOMATED COUNT: 15.1 % (ref 12.3–15.4)
GLOBULIN UR ELPH-MCNC: 3.4 GM/DL
GLUCOSE BLDC GLUCOMTR-MCNC: 123 MG/DL (ref 70–105)
GLUCOSE BLDC GLUCOMTR-MCNC: 90 MG/DL (ref 70–105)
GLUCOSE SERPL-MCNC: 100 MG/DL (ref 65–99)
HCT VFR BLD AUTO: 30.5 % (ref 34–46.6)
HGB BLD-MCNC: 8.9 G/DL (ref 12–15.9)
MCH RBC QN AUTO: 27.5 PG (ref 26.6–33)
MCHC RBC AUTO-ENTMCNC: 29.2 G/DL (ref 31.5–35.7)
MCV RBC AUTO: 94.1 FL (ref 79–97)
PLATELET # BLD AUTO: 76 10*3/MM3 (ref 140–450)
PMV BLD AUTO: 10.3 FL (ref 6–12)
POTASSIUM SERPL-SCNC: 4.3 MMOL/L (ref 3.5–5.2)
PROT SERPL-MCNC: 7.3 G/DL (ref 6–8.5)
RBC # BLD AUTO: 3.24 10*6/MM3 (ref 3.77–5.28)
SINUS: 3.2 CM
SODIUM SERPL-SCNC: 136 MMOL/L (ref 136–145)
STJ: 2.44 CM
WBC NRBC COR # BLD AUTO: 2.36 10*3/MM3 (ref 3.4–10.8)

## 2025-03-18 PROCEDURE — 99232 SBSQ HOSP IP/OBS MODERATE 35: CPT | Performed by: NURSE PRACTITIONER

## 2025-03-18 PROCEDURE — 85027 COMPLETE CBC AUTOMATED: CPT | Performed by: INTERNAL MEDICINE

## 2025-03-18 PROCEDURE — 82948 REAGENT STRIP/BLOOD GLUCOSE: CPT | Performed by: NURSE PRACTITIONER

## 2025-03-18 PROCEDURE — 80053 COMPREHEN METABOLIC PANEL: CPT | Performed by: INTERNAL MEDICINE

## 2025-03-18 RX ORDER — DAPAGLIFLOZIN 5 MG/1
5 TABLET, FILM COATED ORAL DAILY
Qty: 30 TABLET | Refills: 0 | Status: SHIPPED | OUTPATIENT
Start: 2025-03-18

## 2025-03-18 RX ORDER — CALCIUM GLUCONATE 20 MG/ML
2000 INJECTION, SOLUTION INTRAVENOUS ONCE
Status: DISCONTINUED | OUTPATIENT
Start: 2025-03-18 | End: 2025-03-18 | Stop reason: HOSPADM

## 2025-03-18 RX ORDER — LISINOPRIL 2.5 MG/1
2.5 TABLET ORAL DAILY
Qty: 30 TABLET | Refills: 0 | Status: SHIPPED | OUTPATIENT
Start: 2025-03-18

## 2025-03-18 RX ORDER — VITAMIN K2 90 MCG
1 CAPSULE ORAL DAILY
Qty: 30 CAPSULE | Refills: 0 | Status: SHIPPED | OUTPATIENT
Start: 2025-03-18

## 2025-03-18 RX ADMIN — FUROSEMIDE 20 MG: 20 TABLET ORAL at 09:04

## 2025-03-18 NOTE — TELEPHONE ENCOUNTER
Caller: Gladys Garrison    Relationship: Self    Best call back number: [phone]    Which medication are you concerned about: FARXIGA 5 MG    Who prescribed you this medication: DR. ELMER GARZA    What are your concerns: PT IS CALLING TO SEE WHAT WE COULD HELP HER WITH BECAUSE HER MEDICATION OVER $400 TO PICK IT UP AT [facility] WITH HER INSURANCE. SHE ASKED IF WE COULD CALL HER 11-11:30A OR AFTER 2:30P ON WEDNESDAY AND THURSDAY OR ANYTIME ON FRIDAY. PT WOULD LIKE A CALL BACK

## 2025-03-18 NOTE — OUTREACH NOTE
Prep Survey      Flowsheet Row Responses   [facility] patient discharged from? [facility]   Is LACE score < 7 ? No   Eligibility [facility]   Date of Admission 03/16/25   Date of Discharge 03/18/25   Discharge Disposition Home or Self Care   Discharge diagnosis Dyspnea, CHF exacerbation   Does the patient have one of the following disease processes/diagnoses(primary or secondary)? CHF   Does the patient have Home health ordered? No   Is there a DME ordered? No   Prep survey completed? Yes            Yary MARTIN - Registered Nurse

## 2025-03-18 NOTE — PROGRESS NOTES
CARDIOLOGY FOLLOW-UP PROGRESS NOTE      Reason for follow-up:    SOA  Congenital heart disease  Volume overload  SSS/CHB  Daul chamber pm     Attending: Canelo Unger MD      Subjective .     Improving SOA, Improving LE edema  Denies chest pain     ROS  Pertinent items are noted in HPI, all other systems reviewed and negative  Allergies: Promethazine, Tape, Adhesive tape, and Flexeril [cyclobenzaprine]    Scheduled Meds:calcium gluconate, 2,000 mg, Intravenous, Once  enoxaparin sodium, 30 mg, Subcutaneous, Daily  furosemide, 20 mg, Oral, BID Diuretics  insulin lispro, 2-7 Units, Subcutaneous, 4x Daily AC & at Bedtime  potassium & sodium phosphates, 1 packet, Oral, BID AC  sodium chloride, 10 mL, Intravenous, Q12H        Continuous Infusions:     PRN Meds:.  acetaminophen **OR** acetaminophen **OR** acetaminophen    senna-docusate sodium **AND** polyethylene glycol **AND** bisacodyl **AND** bisacodyl    Calcium Replacement - Follow Nurse / BPA Driven Protocol    dextrose    dextrose    glucagon (human recombinant)    HYDROcodone-acetaminophen    Magnesium Standard Dose Replacement - Follow Nurse / BPA Driven Protocol    Phosphorus Replacement - Follow Nurse / BPA Driven Protocol    Potassium Replacement - Follow Nurse / BPA Driven Protocol    [COMPLETED] Insert Peripheral IV **AND** sodium chloride    sodium chloride    sodium chloride    Objective     VITAL SIGNS  Patient Vitals for the past 24 hrs:   BP Temp Temp src Pulse Resp SpO2 Weight   03/18/25 0745 128/59 97.6 °F (36.4 °C) Oral 60 16 98 % --   03/18/25 0647 -- -- -- -- -- -- 51.2 kg (112 lb 12.8 oz)   03/18/25 0322 109/52 98.3 °F (36.8 °C) Oral 62 11 97 % --   03/18/25 0001 125/50 98 °F (36.7 °C) Oral 60 13 98 % --   03/17/25 2006 121/50 98.1 °F (36.7 °C) Oral 60 12 98 % --   03/17/25 1617 131/59 98 °F (36.7 °C) Oral 59 16 99 % --   03/17/25 1130 140/63 98.2 °F (36.8 °C) Oral 61 16 98 % --        Flowsheet Rows      Flowsheet Row First Filed Value  "  Admission Height 152.4 cm (60\") Documented at 03/16/2025 1116   Admission Weight 54.5 kg (120 lb 2.4 oz) Documented at 03/16/2025 1118            Body mass index is 22.03 kg/m².      Intake/Output Summary (Last 24 hours) at 3/18/2025 1047  Last data filed at 3/18/2025 0322  Gross per 24 hour   Intake 240 ml   Output 0 ml   Net 240 ml        TELEMETRY:     Paced    Physical Exam:  Vitals reviewed.   Constitutional:       General: Not in acute distress.     Appearance: Normal appearance. Well-developed.   Eyes:      Pupils: Pupils are equal, round, and reactive to light.   HENT:      Head: Normocephalic and atraumatic.   Neck:      Vascular: No JVD.   Pulmonary:      Effort: Pulmonary effort is normal.      Breath sounds: Normal breath sounds.   Cardiovascular:      Normal rate. Regular rhythm.      Murmurs: There is a systolic murmur.   Pulses:     Intact distal pulses.   Edema:     Peripheral edema absent.   Abdominal:      General: There is no distension.      Palpations: Abdomen is soft.      Tenderness: There is no abdominal tenderness.   Musculoskeletal: Normal range of motion.      Cervical back: Normal range of motion and neck supple. Skin:     General: Skin is warm and dry.   Neurological:      Mental Status: Alert and oriented to person, place, and time.            Results Review:   I reviewed the patient's new clinical results.    CBC    Results from last 7 days   Lab Units 03/18/25  0956 03/17/25  0247 03/16/25  1212   WBC 10*3/mm3 2.36* 2.04* 3.19*   HEMOGLOBIN g/dL 8.9* 7.9* 8.8*   PLATELETS 10*3/mm3 76* 60* 70*     BMP   Results from last 7 days   Lab Units 03/17/25  0247 03/16/25  1212   SODIUM mmol/L 139 135*   POTASSIUM mmol/L 4.0 4.2   CHLORIDE mmol/L 102 103   CO2 mmol/L 29.7* 24.7   BUN mg/dL 18 13   CREATININE mg/dL 1.14* 0.76   GLUCOSE mg/dL 88 88   MAGNESIUM mg/dL  --  2.2     Cr Clearance Estimated Creatinine Clearance: 41.4 mL/min (A) (by C-G formula based on SCr of 1.14 mg/dL (H)).  Coag   "   HbA1C   Lab Results   Component Value Date    HGBA1C 6.20 (H) 09/25/2023    HGBA1C 7.10 (H) 06/20/2023    HGBA1C 9.0 (H) 06/14/2021     Blood Glucose   Glucose   Date/Time Value Ref Range Status   03/18/2025 0744 123 (H) 70 - 105 mg/dL Final     Comment:     Serial Number: [id number]:  [id number]   03/17/2025 2025 162 (H) 70 - 105 mg/dL Final     Comment:     Serial Number: [id number]:  [id number]   03/17/2025 1736 147 (H) 70 - 105 mg/dL Final     Comment:     Serial Number: [id number]:  [id number]   03/17/2025 1128 97 70 - 105 mg/dL Final     Comment:     Serial Number: [id number]:  [id number]   03/17/2025 0819 146 (H) 70 - 105 mg/dL Final     Comment:     Serial Number: [id number]:  [id number]   03/17/2025 0145 98 70 - 105 mg/dL Final     Comment:     Serial Number: [id number]:  [id number]   03/16/2025 2003 165 (H) 70 - 105 mg/dL Final     Comment:     Serial Number: [id number]:  [id number]   03/16/2025 1647 127 (H) 70 - 105 mg/dL Final     Comment:     Serial Number: [id number]:  [id number]     Infection   Results from last 7 days   Lab Units 03/16/25  1348   URINECX  25,000 CFU/mL Normal Urogenital Maricruz     CMP   Results from last 7 days   Lab Units 03/17/25  0247 03/16/25  1212   SODIUM mmol/L 139 135*   POTASSIUM mmol/L 4.0 4.2   CHLORIDE mmol/L 102 103   CO2 mmol/L 29.7* 24.7   BUN mg/dL 18 13   CREATININE mg/dL 1.14* 0.76   GLUCOSE mg/dL 88 88   ALBUMIN g/dL 3.7  --    BILIRUBIN mg/dL 0.4  --    ALK PHOS U/L 63  --    AST (SGOT) U/L 23  --    ALT (SGPT) U/L 9  --      ABG      UA    Results from last 7 days   Lab Units 03/16/25  1348   NITRITE UA  Negative   WBC UA /HPF 0-2   BACTERIA UA /HPF None Seen   SQUAM EPITHEL UA /HPF 0-2   URINECX  25,000 CFU/mL Normal Urogenital Maricruz     JALEN    Lab Results   Component Value Date    POCMETH Negative 01/29/2025    POCAMPHET Negative 01/29/2025    POCBARBITUR Negative 01/29/2025    POCBENZO Negative 01/29/2025     POCCOCAINE Negative 01/29/2025    POCOPIATES Positive (A) 01/29/2025    POCOXYCODO Negative 01/29/2025    POCPHENCYC Negative 01/29/2025    POCPROPOXY Negative 01/29/2025    POCTHC Negative 01/29/2025     Lysis Labs   Results from last 7 days   Lab Units 03/18/25  0956 03/17/25  0247 03/16/25  1212   HEMOGLOBIN g/dL 8.9* 7.9* 8.8*   PLATELETS 10*3/mm3 76* 60* 70*   CREATININE mg/dL  --  1.14* 0.76     Radiology(recent) XR Chest 1 View  Result Date: 3/16/2025  Impression: Cardiomegaly and central pulmonary vascular congestion. Electronically Signed: Westley Kirkpatrick MD  3/16/2025 12:31 PM EDT  Workstation ID: [id number]      Imaging Results (Last 24 Hours)       ** No results found for the last 24 hours. **            Results from last 7 days   Lab Units 03/16/25  1320   HSTROP T ng/L 22*       EKG           I personally viewed and interpreted the patient's EKG/Telemetry data:        ECHOCARDIOGRAM:     Results for orders placed during the hospital encounter of 02/21/25    Adult Transesophageal Echo ([facility]) W/ Cont if Necessary Per Protocol    Interpretation Summary    Left ventricular systolic function is low normal. Left ventricular ejection fraction appears to be 51 - 55%.    Mildly reduced right ventricular systolic function noted.    The right ventricular cavity is moderately dilated.    The left atrial cavity is moderately dilated.    Moderate to severe tricuspid valve regurgitation is present.    Saline test results are positive. PFO is present.    No obvious source of vegetation noted on the valves or pace lead. There is small echodensity near the RV lead and the VSD patch but not independently mobile and possibly representing fibrous tissue. Clinical correlation recommended.    Electronically signed by Clarence Dickerson MD, 02/21/25, 4:16 PM EST.        STRESS MYOVIEW:      CARDIAC CATHETERIZATION:      OTHER:         Assessment & Plan            Dyspnea        ASSESSMENT:    Diastolic heart  failure   reduced RV systolic function   Tetrology of Fallot   Hypertension  Pulmonary HTN  VHD-pulmonic and tricuspid  Hx SVT  SSS/complete heart block/presence PPM  Abnormal LENY with question of PM lead echo density     -repeat LENY 2/21:  no mobile echodensity or signs of vegetation  Metastatic breast CA/bone mets/pancytopenia    PLAN:    Volume status appears stable  Repeat 2d echo pending  Recent leny EF 50-55%; Mod to severe TR/PFO present.  No obvious vegetation  Continue supportive care  Dr. Lopez to review echo         Additional recommendations per Dr. Lopez     I discussed the patient's findings and my recommendations with patient and RN                  Electronically signed by CIRO Mar, 03/18/25, 10:47 AM EDT.          CIRO Mar  03/18/25  10:47 EDT

## 2025-03-18 NOTE — PROGRESS NOTES
[facility] MEDICINE SERVICE  DAILY PROGRESS NOTE    NAME: Gladys Garrison  : 1960  MRN: [id number]      LOS: 2 days     PROVIDER OF SERVICE: Canelo Unger MD    Chief Complaint: Dyspnea    Subjective:     Interval History:  History taken from: patient      History of Present Illness: Gladys Garrison is a 64 y.o. female with a CMH of breast cancer, tetralogy of Fallot, systolic CHF, pacemaker, HTN, who presented to [facility] on 3/16/2025 with dyspnea patient states that she has been having complaints of shortness of progressive shortness of breath over the last several weeks.  Of note patient was recently admitted.2025-2025 for sepsis, group B bacteremia with ICD lead vegetation.  Patient states that she has been typically shortness of breath since discharge, this is progressively became worse, patient also with dyspnea on exertion.  Also with complaints of bilateral lower extremity edema.  Patient was evaluated by her primary care doctor within the last 2 weeks, who recommended patient to come to the emergency department for further evaluation.  At this time patient denies any chest pain, fever, cough, congestion      3/17 patient seen and examined in bed no acute distress, vital signs stable, WBC 2.0 hemoglobin 7.9.  Discussed with RN. Edema resolved  3/18 seen in bed NAD, Anxious to go home, denies chest pain, vss creat 1.14    Review of Systems  Constitutional:  Negative for fever.   HENT:  Negative for congestion.    Respiratory:  Positive for shortness of breath. Negative for cough.    Cardiovascular:  Positive for leg swelling. Negative for chest pain.   Gastrointestinal:  Negative for abdominal pain and vomiting.   Musculoskeletal:  Negative for back pain.   Neurological:  Negative for headaches.   Psychiatric/Behavioral:  Negative for confusion.    Objective:     Vital Signs  Temp:  [97.6 °F (36.4 °C)-98.3 °F (36.8 °C)] 97.6 °F (36.4 °C)  Heart Rate:  [59-62]  60  Resp:  [11-16] 16  BP: (109-140)/(50-63) 128/59   Body mass index is 22.03 kg/m².    Physical Exam  Vitals and nursing note reviewed.   Constitutional:       Appearance: Normal appearance.   HENT:      Head: Normocephalic and atraumatic.      Mouth/Throat:      Mouth: Mucous membranes are moist.   Cardiovascular:      Rate and Rhythm: Normal rate and regular rhythm.  Pacemaker present  Pulmonary:      Effort: Pulmonary effort is normal. No respiratory distress.      Breath sounds: Normal breath sounds.   Abdominal:      Palpations: Abdomen is soft.      Tenderness: There is no abdominal tenderness.   Musculoskeletal:      Right lower leg: Edema present.      Left lower leg: Edema present.   Skin:     General: Skin is warm and dry.   Neurological:      Mental Status: She is alert and oriented to person, place, and time.      Diagnostic Data    Results from last 7 days   Lab Units 03/17/25  0247   WBC 10*3/mm3 2.04*   HEMOGLOBIN g/dL 7.9*   HEMATOCRIT % 26.3*   PLATELETS 10*3/mm3 60*   GLUCOSE mg/dL 88   CREATININE mg/dL 1.14*   BUN mg/dL 18   SODIUM mmol/L 139   POTASSIUM mmol/L 4.0   AST (SGOT) U/L 23   ALT (SGPT) U/L 9   ALK PHOS U/L 63   BILIRUBIN mg/dL 0.4   ANION GAP mmol/L 7.3       XR Chest 1 View  Result Date: 3/16/2025  Impression: Cardiomegaly and central pulmonary vascular congestion. Electronically Signed: Westley Kirkpatrick MD  3/16/2025 12:31 PM EDT  Workstation ID: [id number]        I reviewed the patient's new clinical results.  Scheduled Meds:enoxaparin sodium, 30 mg, Subcutaneous, Daily  furosemide, 20 mg, Oral, BID Diuretics  insulin lispro, 2-7 Units, Subcutaneous, 4x Daily AC & at Bedtime  sodium chloride, 10 mL, Intravenous, Q12H      Continuous Infusions:   PRN Meds:.  acetaminophen **OR** acetaminophen **OR** acetaminophen    senna-docusate sodium **AND** polyethylene glycol **AND** bisacodyl **AND** bisacodyl    Calcium Replacement - Follow Nurse / BPA Driven Protocol    dextrose    dextrose     glucagon (human recombinant)    HYDROcodone-acetaminophen    Magnesium Standard Dose Replacement - Follow Nurse / BPA Driven Protocol    Phosphorus Replacement - Follow Nurse / BPA Driven Protocol    Potassium Replacement - Follow Nurse / BPA Driven Protocol    [COMPLETED] Insert Peripheral IV **AND** sodium chloride    sodium chloride    sodium chloride   Assessment/Plan:     Active and Resolved Problems  Active Hospital Problems    Diagnosis  POA    **Dyspnea [R06.00]  Yes      Resolved Hospital Problems   No resolved problems to display.     CHF exacerbation  -Troponins 24, 22  -Delta -2  -BNP 2,523.0  -GREGORY 2/21/2025 LVSF low normal.  EF 51 to 55%.  Mildly right reduced ventricular systolic function.  Positive PFO.  RV seen mildly dilated.  The LAC is mildly dilated.  -EKG: Ventricular paced rhythm.  , QTc 474  -CXR: Cardiomegaly and central pulmonary vascular congestion  -TTE ordered  -Lasix 20 mg twice daily ordered  -Strict I's and O's  -Daily weight  -Cardiology consult (patient of Dr. Lopez)     History breast cancer with bone metastasis  Thrombocytopenia  -Was on Arimidex in the past was discontinued due to osteoporosis  -Had intolerance to tamoxifen  -She is currently on Abemeciclib outpatient, this has been considered to have alternate therapies given frequent UTIs as per pharmacy note on 12/10  -Patient can follow outpatient with her primary oncologist Dr. Nunez to discuss alternative therapies  -Monitor daily CBCs, monitor platelets  -Continue Norco  mg q 4 PRN for moderate pain  -Continue with intrathecal pump     Diabetes mellitus  -Accu-Cheks before meals and at bedtime  -SSI  -Healthy heart carb conscious diet    Hypocalcemia. Hypophos replace and monitor    VTE Prophylaxis:  Pharmacologic VTE prophylaxis orders are present.      Disposition Planning:   Barriers to Discharge:chf  Anticipated Date of Discharge: 3/18  Place of Discharge: home  Time: 34 minutes     Code Status and Medical  Interventions: CPR (Attempt to Resuscitate); Full Support   Ordered at: 03/16/25 1434     Code Status (Patient has no pulse and is not breathing):    CPR (Attempt to Resuscitate)     Medical Interventions (Patient has pulse or is breathing):    Full Support       Signature: Electronically signed by Canelo Ugner MD, 03/18/25, 08:29 EDT.  [facility] Hospitalist Team

## 2025-03-18 NOTE — PLAN OF CARE
Goal Outcome Evaluation:                 Patient has discharge orders. IV removed and discharge instructions provided.

## 2025-03-18 NOTE — PLAN OF CARE
Goal Outcome Evaluation:      Pt is resting, vss,no complaints,  at bedside, call light in reach, POC ongoing. Pt is expected to d/c

## 2025-03-18 NOTE — CASE MANAGEMENT/SOCIAL WORK
Case Management Discharge Note      Final Note: Routine home         Selected Continued Care - Discharged on 3/18/2025 Admission date: 3/16/2025 - Discharge disposition: Home or Self Care       Transportation Services  Private: Car    Final Discharge Disposition Code: 01 - home or self-care

## 2025-03-19 ENCOUNTER — TRANSITIONAL CARE MANAGEMENT TELEPHONE ENCOUNTER (OUTPATIENT)
Dept: CALL CENTER | Facility: HOSPITAL | Age: 65
End: 2025-03-19
Payer: MEDICARE

## 2025-03-19 NOTE — OUTREACH NOTE
Call Center TCM Note      Flowsheet Row Responses   [facility] facility patient discharged from? [facility]   Does the patient have one of the following disease processes/diagnoses(primary or secondary)? CHF   TCM attempt successful? No   Unsuccessful attempts Attempt 1             Dedra Montana MA    3/19/2025, 09:09 EDT

## 2025-03-19 NOTE — OUTREACH NOTE
Call Center TCM Note      Flowsheet Row Responses   [facility] patient discharged from? [facility]   Does the patient have one of the following disease processes/diagnoses(primary or secondary)? CHF   TCM attempt successful? Yes   Call start time 1544   Discharge diagnosis Dyspnea, CHF exacerbation   Meds reviewed with patient/caregiver? Yes   Is the patient having any side effects they believe may be caused by any medication additions or changes? No   Does the patient have all medications ordered at discharge? Yes   Is the patient taking all medications as directed (includes completed medication regime)? Yes   Does the patient have an appointment with their PCP within 7-14 days of discharge? No appointments available   Nursing Interventions Routed TCM call to PCP office, Patient declined scheduling/rescheduling appointment at this time   Has home health visited the patient within 72 hours of discharge? N/A   Pulse Ox monitoring Intermittent   Pulse Ox device source Patient   Did the patient receive a copy of their discharge instructions? Yes   Nursing interventions Reviewed instructions with patient   What is the patient's perception of their health status since discharge? Same   If the patient is a current smoker, are they able to teach back resources for cessation? Not a smoker   Is the patient/caregiver able to teach back the hierarchy of who to call/visit for symptoms/problems? PCP, Specialist, Home health nurse, Urgent Care, ED, 911 Yes   TCM call completed? Yes   Wrap up additional comments D/C DX,  Dyspnea,  BLE edema - Pt states she is about the same as at discharge. She did not feel like talking long. Pt declines assist in scheduling TCM APPT with PCP stating she will call the office             Dedra Montana MA    3/19/2025, 15:48 EDT

## 2025-03-24 NOTE — DISCHARGE SUMMARY
[facility] Medicine Services  Discharge Summary    Date of Service: 3/24/2025  Patient Name: Gladys Garrison  : 1960  MRN: [id number]    Date of Admission: 3/16/2025  Discharge Diagnosis: Dyspnea  Date of Discharge: 3/24/2025  Primary Care Physician: Chirag Robles DO      Presenting Problem:   Dyspnea [R06.00]  Elevated brain natriuretic peptide (BNP) level [R79.89]  Bilateral lower extremity edema [R60.0]  Dyspnea, unspecified type [R06.00]    Active and Resolved Hospital Problems:  Active Hospital Problems    Diagnosis POA    **Dyspnea [R06.00] Yes      Resolved Hospital Problems   No resolved problems to display.       CHF exacerbation  -Troponins ,   -Delta -2  -BNP 2,523.0  -GREGORY 2025 LVSF low normal.  EF 51 to 55%.  Mildly right reduced ventricular systolic function.  Positive PFO.  RV seen mildly dilated.  The LAC is mildly dilated.  -EKG: Ventricular paced rhythm.  , QTc 474  -CXR: Cardiomegaly and central pulmonary vascular congestion  -TTE ordered  -Lasix 20 mg twice daily ordered  -Strict I's and O's  -Daily weight  -Cardiology consult (patient of Dr. Lopez)     History breast cancer with bone metastasis  Thrombocytopenia  -Was on Arimidex in the past was discontinued due to osteoporosis  -Had intolerance to tamoxifen  -She is currently on Abemeciclib outpatient, this has been considered to have alternate therapies given frequent UTIs as per pharmacy note on 12/10  -Patient can follow outpatient with her primary oncologist Dr. Nunez to discuss alternative therapies  -Monitor daily CBCs, monitor platelets  -Continue Norco  mg q 4 PRN for moderate pain  -Continue with intrathecal pump     Diabetes mellitus  -Accu-Cheks before meals and at bedtime  -SSI  -Healthy heart carb conscious diet     Hypocalcemia. Hypophos replace and monitor    Hospital Course     History of Present Illness: Gladys Garrison is a 64 y.o. female with a CMH of breast cancer,  "tetralogy of Fallot, systolic CHF, pacemaker, HTN, who presented to [facility] on 3/16/2025 with dyspnea patient states that she has been having complaints of shortness of progressive shortness of breath over the last several weeks.  Of note patient was recently admitted.01/05/2025-1/13/2025 for sepsis, group B bacteremia with ICD lead vegetation.  Patient states that she has been typically shortness of breath since discharge, this is progressively became worse, patient also with dyspnea on exertion.  Also with complaints of bilateral lower extremity edema.  Patient was evaluated by her primary care doctor within the last 2 weeks, who recommended patient to come to the emergency department for further evaluation.  At this time patient denies any chest pain, fever, cough, congestion      3/17 patient seen and examined in bed no acute distress, vital signs stable, WBC 2.0 hemoglobin 7.9.  Discussed with RN. Edema resolved  3/18 seen in bed NAD, Anxious to go home, denies chest pain, vss creat 1.14   Dc'd 3/18/25 routine home, condition on discharge stable    DISCHARGE Follow Up Recommendations for labs and diagnostics: Follow-up with PCP in a week        Day of Discharge     Vital Signs:/48 (BP Location: Right arm, Patient Position: Lying)   Pulse 61   Temp 97.6 °F (36.4 °C)   Resp 17   Ht 152.4 cm (60\")   Wt 51.2 kg (112 lb 12.8 oz)   LMP  (LMP Unknown)   SpO2 98%   BMI 22.03 kg/m²           Physical Exam   Vitals and nursing note reviewed.   Constitutional:       Appearance: Normal appearance.   HENT:      Head: Normocephalic and atraumatic.      Mouth/Throat:      Mouth: Mucous membranes are moist.   Cardiovascular:      Rate and Rhythm: Normal rate and regular rhythm.  Pacemaker present  Pulmonary:      Effort: Pulmonary effort is normal. No respiratory distress.      Breath sounds: Normal breath sounds.   Abdominal:      Palpations: Abdomen is soft.      Tenderness: There is no abdominal " tenderness.   Musculoskeletal:      Right lower leg: Edema present.      Left lower leg: Edema present.   Skin:     General: Skin is warm and dry.   Neurological:      Mental Status: She is alert and oriented to person, place, and time.      Pertinent  and/or Most Recent Results     LAB RESULTS:      Lab 03/18/25  0956   WBC 2.36*   HEMOGLOBIN 8.9*   HEMATOCRIT 30.5*   PLATELETS 76*   MCV 94.1         Lab 03/18/25  0956   SODIUM 136   POTASSIUM 4.3   CHLORIDE 102   CO2 24.6   ANION GAP 9.4   BUN 18   CREATININE 0.78   EGFR 86.0   GLUCOSE 100*   CALCIUM 9.3         Lab 03/18/25  0956   TOTAL PROTEIN 7.3   ALBUMIN 3.9   GLOBULIN 3.4   ALT (SGPT) 8   AST (SGOT) 26   BILIRUBIN 0.4   ALK PHOS 65                     Brief Urine Lab Results  (Last result in the past 365 days)        Color   Clarity   Blood   Leuk Est   Nitrite   Protein   CREAT   Urine HCG        03/16/25 1348 Yellow   Clear   Negative   Trace   Negative   Negative                 Microbiology Results (last 10 days)       Procedure Component Value - Date/Time    Urine Culture - Urine, Urine, Clean Catch [[id number]] Collected: 03/16/25 1348    Lab Status: Final result Specimen: Urine, Clean Catch Updated: 03/17/25 1155     Urine Culture 25,000 CFU/mL Normal Urogenital Maricruz    Narrative:      Colonization of the urinary tract without infection is common. Treatment is discouraged unless the patient is symptomatic, pregnant, or undergoing an invasive urologic procedure.            XR Chest 1 View  Result Date: 3/16/2025  Impression: Impression: Cardiomegaly and central pulmonary vascular congestion. Electronically Signed: Westley Kirkpatrick MD  3/16/2025 12:31 PM EDT  Workstation ID: [id number]      Results for orders placed during the hospital encounter of 08/04/23    Duplex Venous Lower Extremity - Bilateral CAR    Interpretation Summary    Normal bilateral lower extremity venous duplex scan.      Results for orders placed during the hospital encounter of  08/04/23    Duplex Venous Lower Extremity - Bilateral CAR    Interpretation Summary    Normal bilateral lower extremity venous duplex scan.      Results for orders placed during the hospital encounter of 03/16/25    Adult Transthoracic Echo Limited W/ Cont if Necessary Per Protocol    Interpretation Summary    Left ventricular systolic function is normal. Left ventricular ejection fraction appears to be 56 - 60%.    The left ventricular cavity is small in size.    Left ventricular wall thickness is consistent with mild concentric hypertrophy.    Left ventricular diastolic function was indeterminate.    Moderately reduced right ventricular systolic function noted.    The right ventricular cavity is moderate to severely dilated.    The left atrial cavity is mild to moderately dilated.    The right atrial cavity is mildly  dilated.    Moderate to severe tricuspid valve regurgitation is present.    Estimated right ventricular systolic pressure from tricuspid regurgitation is moderately elevated (45-55 mmHg).    Moderate pulmonary hypertension is present.      Labs Pending at Discharge:  Pending Results       None            Procedures Performed           Consults:   Consults       Date and Time Order Name Status Description    3/17/2025  1:41 PM Inpatient Cardiology Consult Completed           ASSESSMENT:  Diastolic heart failure   reduced RV systolic function   Tetrology of Fallot   Hypertension  Pulmonary HTN  VHD-pulmonic and tricuspid  Hx SVT  SSS/complete heart block/presence PPM  Abnormal LENY with question of PM lead echo density     -repeat LENY 2/21:  no mobile echodensity or signs of vegetation  Metastatic breast CA/bone mets/pancytopenia     PLAN:  Volume status appears stable  Repeat 2d echo pending  Recent leny EF 50-55%; Mod to severe TR/PFO present.  No obvious vegetation  Continue supportive care  Dr. Lopez to review echo        Additional recommendations per Dr. Lopez   I discussed the patient's findings  "and my recommendations with patient and RN     Electronically signed by CIRO Mar, 03/18/25, 10:47 AM EDT.       CIRO Mar  03/18/25    Discharge Details        Discharge Medications        New Medications        Instructions Start Date   dapagliflozin 5 MG tablet tablet  Commonly known as: Farxiga   5 mg, Oral, Daily      lisinopril 2.5 MG tablet  Commonly known as: PRINIVIL,ZESTRIL   2.5 mg, Oral, Daily      Magnesium Glycinate 120 MG capsule   120 mg, Oral, 2 Times Daily      Methyl-Folate 1000 MCG capsule  Generic drug: Levomefolate Glucosamine   1 tablet, Oral, Daily      potassium & sodium phosphates 280-160-250 MG pack packet  Commonly known as: PHOS-NAK   1 packet, Oral, 2 Times Daily Before Meals             Continue These Medications        Instructions Start Date   Accu-Chek Araceli device   Use as instructed   To test blood sugar bid      B-D UF III MINI PEN NEEDLES 31G X 5 MM misc  Generic drug: Insulin Pen Needle   Use to inject insulin twice daily   DX: E11.9      FreeStyle Rajeev 14 Day [place] device   1 each, Not Applicable, Daily      FreeStyle Rajeev 14 Day Sensor misc   1 each, Not Applicable, Daily      furosemide 20 MG tablet  Commonly known as: LASIX   20 mg, Oral, Daily      HYDROcodone-acetaminophen  MG per tablet  Commonly known as: NORCO   1 tablet, Oral, Every 4 Hours PRN      insulin NPH-insulin regular (70-30) 100 UNIT/ML injection  Commonly known as: humuLIN 70/30,novoLIN 70/30   5 Units, Subcutaneous, Every Evening, If BS over 180      Insulin Syringe-Needle U-100 28G X 1/2\" 1 ML misc   1 each, Not Applicable, 2 Times Daily      Morphine Sulfate (PF) 8 MG/ML solution   1 mg, Daily      morphine 5 mg/mL   Intrathecal, Continuous               Allergies   Allergen Reactions    Promethazine Other (See Comments)     Hyperactive mean    Tape Other (See Comments)     .blisters      Adhesive Tape Rash    Flexeril [Cyclobenzaprine] Rash         Discharge Disposition: "   Home or Self Care    Diet:  Hospital:No active diet order        Discharge Activity:   Activity Instructions       Gradually Increase Activity Until at Pre-Hospitalization Level                CODE STATUS:  Code Status and Medical Interventions: CPR (Attempt to Resuscitate); Full Support   Ordered at: 03/16/25 1434     Code Status (Patient has no pulse and is not breathing):    CPR (Attempt to Resuscitate)     Medical Interventions (Patient has pulse or is breathing):    Full Support         Future Appointments   Date Time Provider Department Center   4/1/2025  1:15 PM [facility] INJECTION CHAIR [facility]   4/1/2025  1:30 PM Мария Nunez MD [facility]   4/22/2025 12:40 PM Kumar Jarrett PA [facility] PM [facility]       Additional Instructions for the Follow-ups that You Need to Schedule       Discharge Follow-up with PCP   As directed       Currently Documented PCP:    Chirag Robles DO    PCP Phone Number:    [phone]     Follow Up Details: 1 week                Time spent on Discharge including face to face service:  35 minutes    Signature: Electronically signed by Canelo Unger MD, 03/24/25, 12:25 EDT.  [facility], [place] Hospitalist Team

## 2025-03-28 NOTE — TELEPHONE ENCOUNTER
Spoke with patient. We are out of farxiga samples. We do have jardiance. Ok per sadie wing for patient to take jardiance in the mean time.

## 2025-03-28 NOTE — TELEPHONE ENCOUNTER
Caller: Gladys Garrison    Relationship to patient: Self    Best call back number: [phone]     Patient is needing: PT IS HAVING SWELLING AND SOB THAT HAS BEEN ONGOING SINCE A WEEK AND A HALF AGO AND IS CALLING CHECKING UPDATES ON THIS MEDICATION DUE TO HER STILL BEING OUT. I CALLED OFFICE DUE TO NO UPDATES BEING POSTED AND THEY ADVISED ME TO PUT A MESSAGE IN FOR KATIE AND THAT THE OFFICE DID NOT HAVE ANY OF THIS MEDICATION CURRENTLY. 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 THANK YOU.

## 2025-03-31 ENCOUNTER — TELEPHONE (OUTPATIENT)
Dept: PAIN MEDICINE | Facility: CLINIC | Age: 65
End: 2025-03-31
Payer: MEDICARE

## 2025-03-31 NOTE — TELEPHONE ENCOUNTER
Please make sure she does not have any red flag symptoms (fever, chills, lower extremity weakness, numbness). Please also verify that there wasn't an event that precipitated her worsening pain (such as a fall). Is her pain located in the areas where she typically has pain?

## 2025-03-31 NOTE — TELEPHONE ENCOUNTER
Patient denies any red flag symptoms, she states there was no known reason for the worsening of pain, it just happens from time to time and typically taking 1 pill of the hydrocodone is helpful and then can resume ibuprofen and acetaminophen for a few days, however she doesn't feel that this is currently helping her, and the reason for asking for the pain pump increase. She states her pain is located in her typical areas of pain her back and legs

## 2025-03-31 NOTE — TELEPHONE ENCOUNTER
Verbal order given to Tiffanie Slater RN with [facility], she will fax over increase order to be signed in the next 1-2 days.     Tiffanie [phone]

## 2025-03-31 NOTE — TELEPHONE ENCOUNTER
Pain increase at work yesterday, this is not uncommon for her, however usually after ibuprofen and acetaminophen, the pain is more tolerable and if not she can take a hydrocodone and then ibuprofen and acetaminophen for the next few days and find it helpful. She did take hydrocodone yesterday and last night without pain relief. She is asking for a pain pump increase and a refill of hydrocodone - she does have about 8-10 pills left. Home health is coming at 9 this morning for pump refill.    Please advise

## 2025-03-31 NOTE — TELEPHONE ENCOUNTER
Provider: DAVE PHAM PA-C    Caller: LUCIEN SARGENT    Relationship to Patient: SELF    Pharmacy: [facility] [phone]    Phone Number: [phone]    Reason for Call:  PATIENT NEEDS TO SPEAK WITH SOMEONE ASAP ABOUT HER PAIN AND ALSO HER PAIN PUMP.    When was the patient last seen: 1/29/25

## 2025-04-01 ENCOUNTER — OFFICE VISIT (OUTPATIENT)
Dept: ONCOLOGY | Facility: CLINIC | Age: 65
End: 2025-04-01
Payer: MEDICARE

## 2025-04-01 ENCOUNTER — HOSPITAL ENCOUNTER (OUTPATIENT)
Dept: ONCOLOGY | Facility: HOSPITAL | Age: 65
Discharge: HOME | End: 2025-04-01
Admitting: INTERNAL MEDICINE
Payer: MEDICARE

## 2025-04-01 VITALS
OXYGEN SATURATION: 99 % | RESPIRATION RATE: 18 BRPM | SYSTOLIC BLOOD PRESSURE: 134 MMHG | HEART RATE: 88 BPM | BODY MASS INDEX: 22.98 KG/M2 | WEIGHT: 114 LBS | DIASTOLIC BLOOD PRESSURE: 73 MMHG | HEIGHT: 59 IN | TEMPERATURE: 97.9 F

## 2025-04-01 DIAGNOSIS — Z51.5 PALLIATIVE CARE STATUS: ICD-10-CM

## 2025-04-01 DIAGNOSIS — C50.919 MALIGNANT NEOPLASM OF FEMALE BREAST, UNSPECIFIED ESTROGEN RECEPTOR STATUS, UNSPECIFIED LATERALITY, UNSPECIFIED SITE OF BREAST: ICD-10-CM

## 2025-04-01 DIAGNOSIS — C50.919 MALIGNANT NEOPLASM OF FEMALE BREAST, UNSPECIFIED ESTROGEN RECEPTOR STATUS, UNSPECIFIED LATERALITY, UNSPECIFIED SITE OF BREAST: Primary | ICD-10-CM

## 2025-04-01 LAB
BASOPHILS # BLD AUTO: 0.01 10*3/MM3 (ref 0–0.2)
BASOPHILS NFR BLD AUTO: 0.5 % (ref 0–1.5)
DEPRECATED RDW RBC AUTO: 51.3 FL (ref 37–54)
EOSINOPHIL # BLD AUTO: 0.01 10*3/MM3 (ref 0–0.4)
EOSINOPHIL NFR BLD AUTO: 0.5 % (ref 0.3–6.2)
ERYTHROCYTE [DISTWIDTH] IN BLOOD BY AUTOMATED COUNT: 15.4 % (ref 12.3–15.4)
HCT VFR BLD AUTO: 31.4 % (ref 34–46.6)
HGB BLD-MCNC: 9.4 G/DL (ref 12–15.9)
LYMPHOCYTES # BLD AUTO: 0.41 10*3/MM3 (ref 0.7–3.1)
LYMPHOCYTES NFR BLD AUTO: 21.4 % (ref 19.6–45.3)
MCH RBC QN AUTO: 28.6 PG (ref 26.6–33)
MCHC RBC AUTO-ENTMCNC: 29.9 G/DL (ref 31.5–35.7)
MCV RBC AUTO: 95.4 FL (ref 79–97)
MONOCYTES # BLD AUTO: 0.08 10*3/MM3 (ref 0.1–0.9)
MONOCYTES NFR BLD AUTO: 4.2 % (ref 5–12)
NEUTROPHILS NFR BLD AUTO: 1.41 10*3/MM3 (ref 1.7–7)
NEUTROPHILS NFR BLD AUTO: 73.4 % (ref 42.7–76)
PLATELET # BLD AUTO: 68 10*3/MM3 (ref 140–450)
PMV BLD AUTO: 10.1 FL (ref 6–12)
RBC # BLD AUTO: 3.29 10*6/MM3 (ref 3.77–5.28)
WBC NRBC COR # BLD AUTO: 1.92 10*3/MM3 (ref 3.4–10.8)

## 2025-04-01 PROCEDURE — 1125F AMNT PAIN NOTED PAIN PRSNT: CPT | Performed by: INTERNAL MEDICINE

## 2025-04-01 PROCEDURE — 85025 COMPLETE CBC W/AUTO DIFF WBC: CPT | Performed by: INTERNAL MEDICINE

## 2025-04-01 PROCEDURE — 3078F DIAST BP <80 MM HG: CPT | Performed by: INTERNAL MEDICINE

## 2025-04-01 PROCEDURE — 36415 COLL VENOUS BLD VENIPUNCTURE: CPT

## 2025-04-01 PROCEDURE — 3075F SYST BP GE 130 - 139MM HG: CPT | Performed by: INTERNAL MEDICINE

## 2025-04-01 PROCEDURE — 99214 OFFICE O/P EST MOD 30 MIN: CPT | Performed by: INTERNAL MEDICINE

## 2025-04-01 NOTE — PROGRESS NOTES
Hematology/Oncology Outpatient Follow Up    PATIENT NAME:Gladys Garrison  :1960  MRN: [id number]  PRIMARY CARE PHYSICIAN: Chirag Robles DO  REFERRING PHYSICIAN: No ref. provider found      Chief Complaint   Patient presents with    Follow-up     Malignant neoplasm of female breast, unspecified estrogen receptor status, unspecified laterality, unspecified site of breast            HISTORY OF PRESENT ILLNESS:     This is a 63-year-old female who multiple comorbidities including congenital abnormalities such as hypoplastic kidney, tetralogy of Fallot, coarctation of the aorta and congenital VSD status post repair.  Patient developed syncopal episode and for that reason she had she had a CT scan of the chest which showed mass in the left breast.  She had diagnostic mammogram and ultrasound which showed a 2 cm spiculated mass in the left breast at the 1 o'clock position 6 cm from the nipple.  Biopsy of the left breast mass revealed invasive moderately differentiated carcinoma ER/KY positive and HER-2/bishop negative.  Patient also had an ultrasound of the axilla which was concerning for an abnormal left axillary lymph node with cortical thickening. She apparently had an FNA of the left axillary nodule which was positive for malignancy.  On 2017 patient underwent left modified radical mastectomy, right prophylactic mastectomy and immediate breast reconstruction. She also underwent right prophylactic mastectomy.  We have had her on records suggest that patient did have multifocal disease with pT2 pN1 aM0.  The largest focus measured 3.5 cm.  2 of 11 lymph nodes were positive for metastatic disease some with extracapsular extension.  Notes that patient did receive Arimidex neoadjuvant  from 2017 to 2018 prior to her bilateral mastectomies.    Review of her note suggest that she developed cough which she attributed to anastrozole and patient was then placed on tamoxifen.  She had MammaPrint  testing which returned with low risk for relapse.    Her postop course was also complicated by left chest wall abscess which resulted in I&D and removal of the left tissue expander. She was referred for radiation treatment boards ultimately declined.    Patient was then placed on tamoxifen in April 2018 which she stopped after less than a month due to nausea and vomiting.  Patient in the interim also was diagnosed with chronic hepatitis C was seen by the hepatologist.  Tamoxifen was dose reduced to 10 mg daily with the goal to increase to 20 mg daily.      Patient has relocated to [facility].  She has transitioned her care to us now.  She is currently not on any antiestrogen therapy.     Her bilateral mastectomy specimen are available for review    1/29/2021 patient had bone density which showed osteoporosis  Patient was seen by neurosurgery and had a bone scan done in March 2021 which showed subtle activity in the inferior L4 vertebral body appears to correlate with new area of sclerosis on CT of the abdomen and pelvis.  There is also subtle activity with possible new lesion at the posterior left sacrum.  These findings were concerning for metastatic disease.  PET CT scan was recommended to further evaluate.  4/8/2021 patient had a PET CT scan which showed evidence of disease in the neck chest abdomen and pelvis.  But she has a 1.1 cm mixed lytic/sclerotic hypermetabolic lesion within the left hemisacrum consistent with metastatic disease.  There is also accumulation within the inferior endplate of the L4 vertebral body thought to correspond to 1.2 centimeters sclerotic lesion consistent with metastatic disease.  There is a tiny hypermetabolic focus within the L3, T11.  Suspicious for also early metastatic disease.  4/26/2021 patient underwent CT-guided biopsy of sacral lesion, pathology was consistent with metastatic breast cancer ER and OH positive HER-2/bishop was negative.  7/6/21 CT new sclerosis within the  right 12th rib posteriorly which could represent metastatic lesion or a healed or healing fracture, new sclerosis within the right 12th rib posteriorly which         could represent metastatic lesion,new sclerosis within the right T8 transverse process worrisome   for metastatic        disease progression.  7/7/21 complaints of 8/10 back pain and 8/10 LUQ pain. Referral to radiation for questionable mets on CT chest.  7/26/2021 patient had CT scan of the head with contrast with did not show any evidence of metastatic disease.  CT scan of the chest abdomen and pelvis did not show any evidence of progressive disease.  7/26/2021 patient had CEA level which shows declining values CA 15-3 has decreased to 62 from 84  11/3/2021: Patient had CT scan of the chest, abdomen and pelvis. In the chest there were no suspicious pulmonary nodules. There is no clear evidence of progressive disease  12/13/2021 patient had a bone scan which showed uptake along the posterior right ribs consistent with rib fractures.  There is mild uptake in the L4 vertebral body corresponding to the sclerotic lesion seen on previous CT scan.  This was likely due to metastatic disease  10/6/2022: Patient has been lost to follow-up.  She stopped Ibrance due to pulmonary issues.  Apparently patient went to the emergency room and was told that Ibrance was causing lung damage.  October 6, 2022: She has a increasing CA 15-3 and CA 27.29 as well as CEA level.  10/19/2022: Patient had guardant 360 testing done which was negative  10/31/2022: Patient had bone scan which basically showed evidence for mild progression of multifocal osseous metastatic disease.  There appears to be new activity corresponding to the thoracic cervical spine, left scapula, left sacrum and left proximal femur.  CT scan of the chest otherwise is stable.  There is a nonobstructing stone on the left kidney.        Past Medical History:   Diagnosis Date    Allergic     Bone cancer      METASTATIC BONE; stage 4    Bradycardia     SECONDARY TO ABLATION    Breast cancer     mets to lymph nodes; did not do radiation    Cancer of unknown origin     Compression fx, thoracic spine, open, initial encounter     Coronary artery disease     COVID-19 2021    Diabetes mellitus     Heart defect, congenital 1960    tratology of flow (spelling?)    Heart murmur     Hepatitis C     RESOLVED WITH MEDICATION    Hyperlipidemia     Hypertension     Leaky heart valve     tricuspid & mitral valves leak; not yet enough to warrent another open heart surgery    Obesity (BMI 30-39.9) 2021    Pacemaker     dependent, because of ablations to treat tachycardia    Rib fracture     Per patient    Sepsis 2025    Sleep apnea     no machine    Tachycardia     ATRIAL    Type 2 diabetes mellitus 2017       Past Surgical History:   Procedure Laterality Date    BACK SURGERY      neck X 2    BREAST RECONSTRUCTION Bilateral     CARDIAC ABLATION       atrial tachycardia x 5 ablations     CARDIAC CATHETERIZATION      CARDIAC ELECTROPHYSIOLOGY PROCEDURE N/A 2023    Procedure: Pacemaker RV lead insertion with generator change out. [employer];  Surgeon: Steven Hammond MD;  Location: [facility] CATH INVASIVE LOCATION;  Service: Cardiovascular;  Laterality: N/A;    CARDIAC SURGERY      stent placed in aorta    CARDIAC SURGERY      6 surgeries as baby     CERVICAL FUSION ANTERIOR WITH ARTIFICIAL DISCECTOMY IMPLANTATION N/A 2020    Procedure: C4 VERTEBRECTOMY AND ANTERIOR CERIVCAL DISCECTOMY WITH FUSION OF CERVICAL THREE THROUGH FIVE WITH REMOVAL OF HARDWARE C5-C6;  Surgeon: Mark Kaba MD;  Location: [facility] MAIN OR;  Service: Neurosurgery;  Laterality: N/A;     SECTION      x2    COLONOSCOPY N/A 10/23/2020    Procedure: COLONOSCOPY WITH POLYPECTOMY X6;  Surgeon: Stanley Bourne MD;  Location: [facility] ENDOSCOPY;  Service: Gastroenterology;  Laterality: N/A;  POLYPS, INTERNAL  "HEMORRHOIDS    ENDOMETRIAL ABLATION      REMOVAL SCAR TISSUE UTERINE    ENDOSCOPY N/A 10/23/2020    Procedure: ESOPHAGOGASTRODUODENOSCOPY WITH BIOPSY X 1 AREA;  Surgeon: Stanley Bourne MD;  Location: [facility] ENDOSCOPY;  Service: Gastroenterology;  Laterality: N/A;  GASTRITIS, ESOPHAGITIS, HIATAL HERNIA    INSERT / REPLACE / REMOVE PACEMAKER      KNEE SURGERY  2000    MASTECTOMY Bilateral     NECK SURGERY      PACEMAKER IMPLANTATION      [employer]    PAIN PUMP INSERTION/REVISION N/A 04/05/2022    Procedure: PAIN PUMP INSERTION AND INTRATHECAL CATHETER PLACEMENT;  Surgeon: Chuck Hunter MD;  Location: [facility] MAIN OR;  Service: Pain Management;  Laterality: N/A;         Current Outpatient Medications:     Blood Glucose Monitoring Suppl (Accu-Chek Araceli) device, Use as instructed   To test blood sugar bid, Disp: 1 each, Rfl: 0    Continuous Blood Gluc  (FreeStyle Rajeev 14 Day Dunmor) device, 1 each Daily., Disp: 1 each, Rfl: 0    Continuous Blood Gluc Sensor (FreeStyle Rajeev 14 Day Sensor) misc, 1 each Daily., Disp: 6 each, Rfl: 3    dapagliflozin (Farxiga) 5 MG tablet tablet, Take 1 tablet by mouth Daily., Disp: 30 tablet, Rfl: 0    furosemide (LASIX) 20 MG tablet, Take 1 tablet by mouth Daily for 30 days., Disp: 30 tablet, Rfl: 0    HYDROcodone-acetaminophen (NORCO)  MG per tablet, Take 1 tablet by mouth Every 4 (Four) Hours As Needed for Moderate Pain., Disp: 20 tablet, Rfl: 0    insulin NPH-insulin regular (humuLIN 70/30,novoLIN 70/30) (70-30) 100 UNIT/ML injection, Inject 5 Units under the skin into the appropriate area as directed Every Evening. If BS over 180, Disp: 15 mL, Rfl: 3    Insulin Pen Needle (B-D UF III MINI PEN NEEDLES) 31G X 5 MM misc, Use to inject insulin twice daily   DX: E11.9, Disp: 100 each, Rfl: 0    Insulin Syringe-Needle U-100 28G X 1/2\" 1 ML misc, 1 each 2 (Two) Times a Day., Disp: 100 each, Rfl: 6    Levomefolate Glucosamine (Methyl-Folate) 1700 MCG capsule, " Take 1 tablet by mouth Daily., Disp: 30 capsule, Rfl: 0    lisinopril (PRINIVIL,ZESTRIL) 2.5 MG tablet, Take 1 tablet by mouth Daily., Disp: 30 tablet, Rfl: 0    Magnesium Glycinate 120 MG capsule, Take 120 mg by mouth 2 (Two) Times a Day., Disp: 60 capsule, Rfl: 0    morphine 5 mg/mL, by Intrathecal route Continuous., Disp: 20 mL, Rfl: 0    Morphine Sulfate, PF, 8 MG/ML solution, 1 mg by Intrathecal Infusion route Daily. Receives 1 mg through pain pump q 24 hrs, Disp: , Rfl:     potassium & sodium phosphates (PHOS-NAK) 280-160-250 MG pack packet, Take 1 packet by mouth 2 (Two) Times a Day Before Meals., Disp: 14 packet, Rfl: 0    Allergies   Allergen Reactions    Promethazine Other (See Comments)     Hyperactive mean    Tape Other (See Comments)     .blisters      Adhesive Tape Rash    Flexeril [Cyclobenzaprine] Rash       Family History   Problem Relation Age of Onset    Heart disease Mother     Stroke Mother     Lung cancer Mother     Aneurysm Father     Diabetes Sister     No Known Problems Brother     No Known Problems Brother     Diabetes type I Half-Sister     Thyroid cancer Half-Sister     Cancer Maternal Aunt     Heart attack Sister     Thyroid disease Sister     No Known Problems Sister        Cancer-related family history includes Cancer in her maternal aunt; Lung cancer in her mother; Thyroid cancer in her half-sister.    Social History     Tobacco Use    Smoking status: Never     Passive exposure: Never    Smokeless tobacco: Never   Vaping Use    Vaping status: Never Used   Substance Use Topics    Alcohol use: Yes     Comment: drinks rarely    Drug use: Never     I have reviewed and confirmed the accuracy of the patient's history: Chief complaint, HPI, ROS, and Subjective as entered by the MA/LPN/RN. Мария Nunez MD 04/01/25        SUBJECTIVE:      Patient denies any new issues.  Holding abnormality seen and Aromasin did not really change her symptoms.  She now has more pain  "medications    Patient is here today posthospital admission.  She had a repeat GREGORY and a follow-up appointment coming up with her cardiologist.  She has not started her anticancer treatments.  She just completed IV antibiotics approximately 6 days ago.      She feels better with dose reduction in her medication        Gladys Garrison reports a pain score of 9.  Given her pain assessment as noted, treatment options were discussed and the following options were decided upon as a follow-up plan to address the patient's pain: continuation of current treatment plan for pain and referral to specialist for assistance in pain treatment guidance.       REVIEW OF SYSTEMS:     Review of Systems   Constitutional:  Positive for fatigue. Negative for chills and fever.   HENT:  Negative for ear pain, mouth sores, nosebleeds and sore throat.    Eyes:  Negative for photophobia and visual disturbance.   Respiratory:  Negative for wheezing and stridor.    Cardiovascular:  Negative for chest pain and palpitations.   Gastrointestinal:  Negative for abdominal pain, diarrhea, nausea and vomiting.   Endocrine: Negative for cold intolerance and heat intolerance.   Genitourinary:  Negative for dysuria and hematuria.   Musculoskeletal:  Negative for joint swelling and neck stiffness.   Skin:  Negative for color change and rash.   Neurological:  Negative for seizures and syncope.   Hematological:  Negative for adenopathy.        No obvious bleeding   Psychiatric/Behavioral:  Negative for agitation, confusion and hallucinations.          OBJECTIVE:    Vitals:    04/01/25 1344   BP: 134/73   Pulse: 88   Resp: 18   Temp: 97.9 °F (36.6 °C)   TempSrc: Infrared   SpO2: 99%   Weight: 51.7 kg (114 lb)   Height: 149.9 cm (59\")   PainSc: 9    PainLoc: Generalized       Body mass index is 23.03 kg/m².    ECOG    (1) Restricted in physically strenuous activity, ambulatory and able to do work of light nature    Physical Exam  Vitals and nursing note " reviewed.   Constitutional:       General: She is not in acute distress.     Appearance: Normal appearance. She is not diaphoretic.   HENT:      Head: Normocephalic and atraumatic.      Mouth/Throat:      Mouth: Mucous membranes are moist.      Pharynx: Oropharynx is clear.   Eyes:      General: No scleral icterus.        Right eye: No discharge.         Left eye: No discharge.      Extraocular Movements: Extraocular movements intact.      Conjunctiva/sclera: Conjunctivae normal.   Neck:      Thyroid: No thyromegaly.   Cardiovascular:      Rate and Rhythm: Normal rate and regular rhythm.      Pulses: Normal pulses.      Heart sounds: Normal heart sounds.      No friction rub. No gallop.   Pulmonary:      Effort: Pulmonary effort is normal. No respiratory distress.      Breath sounds: Normal breath sounds. No stridor. No wheezing.   Abdominal:      General: Bowel sounds are normal. There is distension.      Palpations: Abdomen is soft. There is no mass.      Tenderness: There is abdominal tenderness. There is guarding (Left upper abdomen). There is no rebound.   Musculoskeletal:         General: No tenderness. Normal range of motion.      Cervical back: Normal range of motion and neck supple.      Right lower leg: No edema.      Left lower leg: No edema.      Comments: Neck pain.  Patient has a neck collar.   Lymphadenopathy:      Cervical: No cervical adenopathy.   Skin:     General: Skin is warm and dry.      Capillary Refill: Capillary refill takes less than 2 seconds.      Findings: No bruising, erythema or rash.   Neurological:      Mental Status: She is alert and oriented to person, place, and time.      Cranial Nerves: No cranial nerve deficit.      Sensory: No sensory deficit.      Motor: No abnormal muscle tone.   Psychiatric:         Mood and Affect: Mood normal.         Behavior: Behavior normal.         Thought Content: Thought content normal.         Judgment: Judgment normal.         I have reexamined  the patient and the results are consistent with the previously documented exam. Мария Nunez MD        RECENT LABS    WBC   Date Value Ref Range Status   04/01/2025 1.92 (L) 3.40 - 10.80 10*3/mm3 Final   02/17/2025 2.96 (L) 3.70 - 10.30 10*3/uL Final     RBC   Date Value Ref Range Status   04/01/2025 3.29 (L) 3.77 - 5.28 10*6/mm3 Final   02/17/2025 3 (L) 3.90 - 5.20 10*6/uL Final     Hemoglobin   Date Value Ref Range Status   04/01/2025 9.4 (L) 12.0 - 15.9 g/dL Final   02/17/2025 8.5 (L) 11.2 - 15.7 g/dL Final     Hematocrit   Date Value Ref Range Status   04/01/2025 31.4 (L) 34.0 - 46.6 % Final   02/17/2025 27.3 (L) 34.0 - 45.0 % Final     MCV   Date Value Ref Range Status   04/01/2025 95.4 79.0 - 97.0 fL Final   02/17/2025 91 79 - 98 fL Final     MCH   Date Value Ref Range Status   04/01/2025 28.6 26.6 - 33.0 pg Final   02/17/2025 28.3 26.0 - 32.0 pg Final     MCHC   Date Value Ref Range Status   04/01/2025 29.9 (L) 31.5 - 35.7 g/dL Final   02/17/2025 31.1 30.7 - 35.5 g/dL Final     RDW   Date Value Ref Range Status   04/01/2025 15.4 12.3 - 15.4 % Final   02/17/2025 13.6 11.5 - 14.5 % Final     RDW-SD   Date Value Ref Range Status   04/01/2025 51.3 37.0 - 54.0 fl Final     MPV   Date Value Ref Range Status   04/01/2025 10.1 6.0 - 12.0 fL Final   02/17/2025 10.1 8.8 - 12.5 fL Final     Platelets   Date Value Ref Range Status   04/01/2025 68 (L) 140 - 450 10*3/mm3 Final   02/17/2025 89 (L) 155 - 369 10*3/uL Final     Neutrophil Rel %   Date Value Ref Range Status   02/10/2025 82 % Final     Neutrophil %   Date Value Ref Range Status   04/01/2025 73.4 42.7 - 76.0 % Final     Lymphocyte Rel %   Date Value Ref Range Status   02/10/2025 9 % Final     Lymphocyte %   Date Value Ref Range Status   04/01/2025 21.4 19.6 - 45.3 % Final     Monocyte Rel %   Date Value Ref Range Status   02/10/2025 7 % Final     Monocyte %   Date Value Ref Range Status   04/01/2025 4.2 (L) 5.0 - 12.0 % Final     Eosinophil %   Date  Value Ref Range Status   04/01/2025 0.5 0.3 - 6.2 % Final   02/10/2025 1 % Final     Basophil Rel %   Date Value Ref Range Status   02/10/2025 0 % Final     Basophil %   Date Value Ref Range Status   04/01/2025 0.5 0.0 - 1.5 % Final     Immature Grans %   Date Value Ref Range Status   03/17/2025 0.5 0.0 - 0.5 % Final   02/10/2025 1 % Final     Neutrophils Absolute   Date Value Ref Range Status   02/10/2025 3.77 1.60 - 6.10 10*3/uL Final     Neutrophils, Absolute   Date Value Ref Range Status   04/01/2025 1.41 (L) 1.70 - 7.00 10*3/mm3 Final     Lymphocytes Absolute   Date Value Ref Range Status   02/10/2025 0.39 (L) 1.20 - 3.90 10*3/uL Final     Lymphocytes, Absolute   Date Value Ref Range Status   04/01/2025 0.41 (L) 0.70 - 3.10 10*3/mm3 Final     Monocytes Absolute   Date Value Ref Range Status   02/10/2025 0.32 0.30 - 0.90 10*3/uL Final     Monocytes, Absolute   Date Value Ref Range Status   04/01/2025 0.08 (L) 0.10 - 0.90 10*3/mm3 Final     Eosinophils Absolute   Date Value Ref Range Status   02/10/2025 0.03 0.00 - 0.50 10*3/uL Final     Eosinophils, Absolute   Date Value Ref Range Status   04/01/2025 0.01 0.00 - 0.40 10*3/mm3 Final     Basophils Absolute   Date Value Ref Range Status   02/10/2025 0.01 0.00 - 0.10 10*3/uL Final     Basophils, Absolute   Date Value Ref Range Status   04/01/2025 0.01 0.00 - 0.20 10*3/mm3 Final     Immature Grans, Absolute   Date Value Ref Range Status   03/17/2025 0.01 0.00 - 0.05 10*3/mm3 Final   02/10/2025 0.03 0.00 - 0.06 10*3/uL Final     nRBC   Date Value Ref Range Status   03/17/2025 0.0 0.0 - 0.2 /100 WBC Final       Lab Results   Component Value Date    GLUCOSE 100 (H) 03/18/2025    BUN 18 03/18/2025    CREATININE 0.78 03/18/2025    EGFRIFNONA 70 12/20/2021    EGFRIFAFRI >60 10/12/2018    BCR 23.1 03/18/2025    K 4.3 03/18/2025    CO2 24.6 03/18/2025    CALCIUM 9.3 03/18/2025    ALBUMIN 3.9 03/18/2025    AST 26 03/18/2025    ALT 8 03/18/2025       ASSESSMENT:    Metastatic  breast cancer presenting as 1.1 cm mixed lytic/sclerotic hypermetabolic lesion in the left hemisacrum suspicious for metastatic disease 1.2 cm sclerotic lesion on L4, small L3 lesion and T11 lesion.  Tumor is ER positive, RI positive and HER-2/bishop negative.  Patient was prescribed combination of Ibrance and Arimidex.  Continued Ibrance due to pulmonary toxicity.  She was then placed on single agent Arimidex.  Upon progression on Arimidex was switched to Faslodex.  She developed L1 vertebral body progressive disease on Faslodex for that reason we will discontinue Faslodex and begin combination of Aromasin and Afinitor.  Patient took Afinitor for 3 days and discontinued due to nausea and fatigue.  [id number] does not show any actionable mutation.  She does not have any soft tissue for us to biopsy for additional NGS testing.  Ongoing management  She is currently on Aromasin and abemaciclib stable.  Holding her meds did not change her symptoms.  Therefore we will go ahead and restart at this time.  Her CT scans completed August 2024 shows overall stable disease with no evidence of progression.  I have encouraged her to have the plain films of the right knee that was recommended by radiologist. Reviewed  Abemaciclib induced diarrhea: Intermittent Lomotil, continue the same  Pancytopenia secondary to abemaciclib: Patient has now agreed to proceed with dose reduction  Fatigue secondary to abemaciclib: This is stable  Thrombocytopenia due to abemaciclib.  CBC reviewed.  Platelets up to 80,000  Bone metastasis:.  Biphosphonate's has been recommended patient has not been able to have due to ongoing dental issues  Right foot swelling: Doppler study was negative  History of medical noncompliance  L1 vertebral body involvement.  Patient is established with neurosurgical team  Pain management: She will follow-up with pain management team  ypT2 N1 aM0 status post left modified radical mastectomy with lymph node dissection and  right prophylactic mastectomy in 2017 performed at [facility].  ER positive, ME positive and HER-2/bishop negative.  Status post bilateral chest wall reconstruction.  According to patient, she tolerated Arimidex very well except that she also had osteoporosis therefore Arimidex was discontinued at that time  Intolerance of tamoxifen in the past  Osteoporosis: We will transition to Xgeva since she has bone metastases  Status post neoadjuvant Arimidex prior to bilateral mastectomies  Thrombocytopenia: Work-up was - December 2020  Neck pain status post cervical spine surgery: Resolved  Complex cardiac medical history including tetralogy of Fallot, coarctation of aorta, VSD status post repair.  Status post stent placement for coarctation of aorta  Personal history and strong family history of breast cancer in multiple in the relatives on paternal side of the family including 4 paternal aunts in their 30s and 40s and 2 maternal aunts at age of 20s and 50s.  There is concern for possible hereditary breast cancer syndrome.  Patient may be  interested in pursuing cancer genetics for her management.  Assessment has been reviewed and updated          Discussion    Patient has metastatic breast cancer estrogen and progesterone dependent and HER-2/bishop negative.  She is currently on Arimidex.  We will add Ibrance.  We will also discontinue Prolia and begin Xgeva to help reduce skeletal events.           Plans:     continue Abemaciclib, aromasin, schedule CT chest/abd and pelvis without contrast in 4 weeks a few days before the apt.   Reviewed her labs  FU with cardiology  Continue supportive care antinausea medications  Reviewed imaging results with patient  Pancytopenia is due to Verzenio: Platelets up to 90,000  MTM pharmacist follow-up with her next week  Checked tumor markers for CEA CA 15-3 and CA 27.29 reviewed  She has completed  palliative XRT to her lumbar spine  Follow-up with Dr. Ruff with  neurosurgical services  Guardant 360 for NGS testing was negative for any actionable mutations  Follow-up with pain management with Dr. Hunter  Guardian 360 does not show any actionable mutation.  Would consider soft tissue biopsy at time of progression for additional NGS testing.  Reviewed with patient  Referred to pulmonary for her dyspnea: Dr. Julio.  Appointment is pending  Follow-up with pain management for ongoing pain issues  Follow-up with /counselor   Reviewed work-up for thrombocytopenia which was negative  Reviewed her bone density which showed osteoporosis  Schedule Xgeva 120 mg subcu monthly once her dental procedures are completed.  She is still waiting on a ida  All questions answered  Follow up in 4 weeks  Labs reviewed            Patient verbalized understanding and is in agreement of the above plan.           Electronically signed by Мария Nunez MD, 04/01/25, 5:28 PM EDT.

## 2025-04-01 NOTE — TELEPHONE ENCOUNTER
DELETE AFTER REVIEWING: Send the encounter HIGH priority, If patient has less than a 3 day supply. If the patient will run out of medication over the weekend add that information to the additional details line. Send to the appropriate pool. Check your call action grid or workflows.    Caller: Gladys Garrison    Relationship: Self    Best call back number:     Requested Prescriptions:   HYDROcodone-acetaminophen (NORCO)  MG per tablet     Pharmacy where request should be sent:    [place] [id number] [facility] [place] - [address] [facility] - [phone]  - [phone]    [address] [facility] [place] [address]   Phone: [phone] Fax: [phone]   Hours: Not open 24 hours       Last office visit with prescribing clinician: Visit date not found   Last telemedicine visit with prescribing clinician: Visit date not found   Next office visit with prescribing clinician: Visit date not found     Additional details provided by patient: PLEASE SEND TO [facility]     Does the patient have less than a 3 day supply:  [x] Yes  [] No    Would you like a call back once the refill request has been completed: [] Yes [] No    If the office needs to give you a call back, can they leave a voicemail: [] Yes [] No    William Holloway Rep   04/01/25 15:03 EDT

## 2025-04-01 NOTE — PROGRESS NOTES
Pt on port sched without a port. Labs drawn peripherally and sent to lab for processing.Pt instructed top sit in lobby to wait for MD appt.

## 2025-04-02 RX ORDER — HYDROCODONE BITARTRATE AND ACETAMINOPHEN 10; 325 MG/1; MG/1
1 TABLET ORAL DAILY PRN
Qty: 30 TABLET | Refills: 0 | Status: SHIPPED | OUTPATIENT
Start: 2025-04-02

## 2025-04-02 NOTE — TELEPHONE ENCOUNTER
Will send a quantity of #30 to take 1 daily PRN to bridge until her visit with Kumar on 4/22/2025.     Reviewed last office visit on 1/29/2025. THEOS and JULIA reviewed and are appropriate. Due to provider being out of office, will refill appropriately.    Covering for Kuamr Jarrett PA-C

## 2025-04-02 NOTE — TELEPHONE ENCOUNTER
"Patient states she only utilizes the hydrocodone with \"pain increase\", she states this is typically once or twice weekly. She typically can take one dose of hydrocodone and resume acetaminophen or ibuprofen. Recently she has utilized the hydrocodone 3 days ago and then again last night.   "

## 2025-04-03 ENCOUNTER — SPECIALTY PHARMACY (OUTPATIENT)
Dept: PHARMACY | Facility: HOSPITAL | Age: 65
End: 2025-04-03
Payer: MEDICARE

## 2025-04-03 NOTE — PROGRESS NOTES
Specialty Pharmacy Note: Verzenio (abemaciclib)    Gladys Garrison is a 64 y.o. female with metastatic breast cancer was seen 04/01/25 by Dr. Nunez. Per provider dictation, no changes to oral specialty medication, Verzenio (abemaciclib).  Labs Review: The CBC  from 4/01/25 have been reviewed and no dose adjustment recommended on oral specialty medication based on lab results.    Specialty Pharmacy will continue to follow patient.        Thank you,  Verona Cowart, Pharm.D.

## 2025-04-07 NOTE — TELEPHONE ENCOUNTER
Caller: [facility]    Best call back number: [phone]    What orders are you requesting (i.e. lab or imaging): PAIN PUMP REFILL     In what timeframe would the patient need to come in: ASAP     Additional notes: SEND TO [facility]

## 2025-04-08 ENCOUNTER — READMISSION MANAGEMENT (OUTPATIENT)
Dept: CALL CENTER | Facility: HOSPITAL | Age: 65
End: 2025-04-08
Payer: MEDICARE

## 2025-04-08 NOTE — OUTREACH NOTE
CHF Week 3 Survey      Flowsheet Row Responses   [facility] patient discharged from? [facility]   Does the patient have one of the following disease processes/diagnoses(primary or secondary)? CHF   Week 3 attempt successful? Yes   Call start time 1405   Call end time 1406   Discharge diagnosis Dyspnea, CHF exacerbation   Psychosocial issues? No   What is the patient's perception of their health status since discharge? Improving   CHF Week 3 call completed? Yes   Graduated Yes   Is the patient interested in additional calls from an ambulatory ? No   Would this patient benefit from a Referral to [facility]? No   Wrap up additional comments Brief call. Patient reports doing well. No questions or concerns at this time.   Call end time 1406            THANG MANCINI - Registered Nurse

## 2025-04-21 ENCOUNTER — TELEPHONE (OUTPATIENT)
Dept: CARDIOLOGY | Facility: CLINIC | Age: 65
End: 2025-04-21
Payer: MEDICARE

## 2025-04-21 ENCOUNTER — HOSPITAL ENCOUNTER (OUTPATIENT)
Dept: PET IMAGING | Facility: HOSPITAL | Age: 65
Discharge: HOME | End: 2025-04-21
Admitting: INTERNAL MEDICINE
Payer: MEDICARE

## 2025-04-21 DIAGNOSIS — C50.919 MALIGNANT NEOPLASM OF FEMALE BREAST, UNSPECIFIED ESTROGEN RECEPTOR STATUS, UNSPECIFIED LATERALITY, UNSPECIFIED SITE OF BREAST: ICD-10-CM

## 2025-04-21 PROCEDURE — 71250 CT THORAX DX C-: CPT

## 2025-04-21 PROCEDURE — 74176 CT ABD & PELVIS W/O CONTRAST: CPT

## 2025-04-21 NOTE — TELEPHONE ENCOUNTER
The lasix working like it should Her feet and legs still have swelling  Its helping a little  She wanted to know if she should take 2 a day instead of 1 She still SOA and has trouble walking     She has only taken her BP medication 2x  It has been doing ok     She will need a refill on the lasix sent to [facility] [address]

## 2025-04-22 ENCOUNTER — OFFICE VISIT (OUTPATIENT)
Dept: CARDIOLOGY | Facility: CLINIC | Age: 65
End: 2025-04-22
Payer: MEDICARE

## 2025-04-22 ENCOUNTER — OFFICE VISIT (OUTPATIENT)
Dept: PAIN MEDICINE | Facility: CLINIC | Age: 65
End: 2025-04-22
Payer: MEDICARE

## 2025-04-22 VITALS
HEIGHT: 59 IN | OXYGEN SATURATION: 99 % | RESPIRATION RATE: 16 BRPM | BODY MASS INDEX: 23.99 KG/M2 | WEIGHT: 119 LBS | SYSTOLIC BLOOD PRESSURE: 137 MMHG | HEART RATE: 50 BPM | TEMPERATURE: 97.2 F | DIASTOLIC BLOOD PRESSURE: 98 MMHG

## 2025-04-22 VITALS
SYSTOLIC BLOOD PRESSURE: 138 MMHG | WEIGHT: 119 LBS | DIASTOLIC BLOOD PRESSURE: 74 MMHG | OXYGEN SATURATION: 98 % | HEIGHT: 59 IN | HEART RATE: 59 BPM | RESPIRATION RATE: 16 BRPM | BODY MASS INDEX: 23.99 KG/M2

## 2025-04-22 DIAGNOSIS — G89.3 CANCER ASSOCIATED PAIN: ICD-10-CM

## 2025-04-22 DIAGNOSIS — M51.362 DEGENERATION OF INTERVERTEBRAL DISC OF LUMBAR REGION WITH DISCOGENIC BACK PAIN AND LOWER EXTREMITY PAIN: Primary | ICD-10-CM

## 2025-04-22 DIAGNOSIS — I38 HEART FAILURE DUE TO VALVULAR DISEASE, CHRONIC, DIASTOLIC: ICD-10-CM

## 2025-04-22 DIAGNOSIS — I50.32 HEART FAILURE DUE TO VALVULAR DISEASE, CHRONIC, DIASTOLIC: ICD-10-CM

## 2025-04-22 DIAGNOSIS — I36.1 NONRHEUMATIC TRICUSPID VALVE REGURGITATION: Primary | ICD-10-CM

## 2025-04-22 DIAGNOSIS — Z79.899 HIGH RISK MEDICATION USE: ICD-10-CM

## 2025-04-22 DIAGNOSIS — M54.16 LUMBAR RADICULOPATHY: ICD-10-CM

## 2025-04-22 DIAGNOSIS — I10 PRIMARY HYPERTENSION: ICD-10-CM

## 2025-04-22 PROCEDURE — 3078F DIAST BP <80 MM HG: CPT | Performed by: INTERNAL MEDICINE

## 2025-04-22 PROCEDURE — 1125F AMNT PAIN NOTED PAIN PRSNT: CPT | Performed by: PHYSICIAN ASSISTANT

## 2025-04-22 PROCEDURE — 99214 OFFICE O/P EST MOD 30 MIN: CPT | Performed by: PHYSICIAN ASSISTANT

## 2025-04-22 PROCEDURE — 1159F MED LIST DOCD IN RCRD: CPT | Performed by: PHYSICIAN ASSISTANT

## 2025-04-22 PROCEDURE — 1160F RVW MEDS BY RX/DR IN RCRD: CPT | Performed by: PHYSICIAN ASSISTANT

## 2025-04-22 PROCEDURE — 3075F SYST BP GE 130 - 139MM HG: CPT | Performed by: INTERNAL MEDICINE

## 2025-04-22 PROCEDURE — 99214 OFFICE O/P EST MOD 30 MIN: CPT | Performed by: INTERNAL MEDICINE

## 2025-04-22 PROCEDURE — 3075F SYST BP GE 130 - 139MM HG: CPT | Performed by: PHYSICIAN ASSISTANT

## 2025-04-22 PROCEDURE — 3080F DIAST BP >= 90 MM HG: CPT | Performed by: PHYSICIAN ASSISTANT

## 2025-04-22 RX ORDER — LISINOPRIL 20 MG/1
20 TABLET ORAL DAILY
Qty: 30 TABLET | Refills: 3 | Status: SHIPPED | OUTPATIENT
Start: 2025-04-22

## 2025-04-22 RX ORDER — ABEMACICLIB 100 MG/1
TABLET ORAL
COMMUNITY

## 2025-04-22 RX ORDER — FUROSEMIDE 20 MG/1
20 TABLET ORAL 2 TIMES DAILY
Qty: 60 TABLET | Refills: 2 | Status: SHIPPED | OUTPATIENT
Start: 2025-04-22 | End: 2025-07-21

## 2025-04-22 RX ORDER — IBUPROFEN 800 MG/1
800 TABLET, FILM COATED ORAL EVERY 8 HOURS PRN
Qty: 30 TABLET | Refills: 0 | Status: SHIPPED | OUTPATIENT
Start: 2025-04-22

## 2025-04-22 RX ORDER — FEXOFENADINE HCL 180 MG/1
180 TABLET ORAL DAILY
COMMUNITY
Start: 2025-04-10 | End: 2025-07-09

## 2025-04-22 NOTE — PROGRESS NOTES
CHIEF COMPLAINT  Back pain  Patient reports her pain has been about the same since her last ov.    Subjective   Gladys Garrison is a 64 y.o. female  who presents for follow-up.  She has a history of chronic low back as well as cancer related pain.  Patient has a history of metastatic breast cancer with lesions to the ribs as well as within the lumbar spine with fluctuating pain that varies day-to-day.  She illustrates pain in a bandlike distribution across the lumbar spine radiating into the bilateral lower extremities as well as also experiencing chest and intercostal pain with history of rib fractures.    Patient currently has an intrathecal pump delivering continuous morphine at a current rate of 1.18 mg/day (10% increase made on 3/31/2025).  Patient found that the last increase of intrathecal medication was helpful.  She does utilize hydrocodone 10 mg for breakthrough pain stating that she was having to escalate her use of the medication prior to having the pump increased.  She is currently under the care of [facility] with home pump refills.    Takes oral chemotherapy Verzenio 100 mg p.o. twice daily.    Pain today is escalated 9/10 due to her recent car ride over here to the clinic.      Back Pain  This is a chronic problem. The current episode started more than 1 year ago. The problem occurs constantly. The problem is unchanged. The pain is present in the lumbar spine. The quality of the pain is described as burning and aching. The pain radiates to the left buttock and right buttock. The pain is at a severity of 9/10. The pain is moderate. The pain is Worse during the day. The symptoms are aggravated by position, standing, twisting and bending. Associated symptoms include numbness (bilateral hands). Pertinent negatives include no weakness. Risk factors include history of cancer, sedentary lifestyle and lack of exercise. She has tried ice, heat and NSAIDs for the symptoms. The treatment provided mild relief.     "    PEG Assessment   What number best describes your pain on average in the past week?8  What number best describes how, during the past week, pain has interfered with your enjoyment of life?8  What number best describes how, during the past week, pain has interfered with your general activity?  8    Review of Pertinent Medical Data ---  No new imaging for review on today    The following portions of the patient's history were reviewed and updated as appropriate: allergies, current medications, past family history, past medical history, past social history, past surgical history, and problem list.    Review of Systems   Constitutional:  Negative for activity change (more).   Gastrointestinal:  Negative for constipation and diarrhea.   Genitourinary:  Difficulty urinating: taking a long time to pee.   Musculoskeletal:  Positive for back pain.   Neurological:  Positive for numbness (bilateral hands). Negative for weakness.   Psychiatric/Behavioral:  Positive for sleep disturbance. Negative for self-injury and suicidal ideas.      I have reviewed and confirmed the accuracy of the ROS as documented by the MA/LPN/RN SUDARSHAN Roy  Vitals:    04/22/25 1241   BP: 137/98   Pulse: 50   Resp: 16   Temp: 97.2 °F (36.2 °C)   SpO2: 99%   Weight: 54 kg (119 lb)   Height: 149.9 cm (59\")   PainSc: 9          Objective   Physical Exam  Vitals and nursing note reviewed.   Constitutional:       Appearance: Normal appearance. She is normal weight.   HENT:      Head: Normocephalic.   Neurological:      Mental Status: She is alert and oriented to person, place, and time.      Cranial Nerves: Cranial nerves 2-12 are intact.      Sensory: Sensation is intact.      Motor: Motor function is intact.      Gait: Gait is intact.   Psychiatric:         Mood and Affect: Mood normal.         Behavior: Behavior normal.         Thought Content: Thought content normal.         Judgment: Judgment normal.             Assessment & Plan   Diagnoses " and all orders for this visit:    1. Degeneration of intervertebral disc of lumbar region with discogenic back pain and lower extremity pain (Primary)  -     ibuprofen (ADVIL,MOTRIN) 800 MG tablet; Take 1 tablet by mouth Every 8 (Eight) Hours As Needed for Mild Pain.  Dispense: 30 tablet; Refill: 0    2. Lumbar radiculopathy    3. Cancer associated pain  -     ibuprofen (ADVIL,MOTRIN) 800 MG tablet; Take 1 tablet by mouth Every 8 (Eight) Hours As Needed for Mild Pain.  Dispense: 30 tablet; Refill: 0    4. High risk medication use        Gladys Garrison reports a pain score of 9.  Given her pain assessment as noted, treatment options were discussed and the following options were decided upon as a follow-up plan to address the patient's pain: continuation of current treatment plan for pain, prescription for non-opiod analgesics, and use of non-medical modalities (ice, heat, stretching and/or behavior modifications).    PHQ-2 Depression Screening  Little interest or pleasure in doing things? Not at all   Feeling down, depressed, or hopeless? Not at all   PHQ-2 Total Score 0       Tobacco Use: Low Risk  (4/22/2025)    Patient History     Smoking Tobacco Use: Never     Smokeless Tobacco Use: Never     Passive Exposure: Never     Tobacco Use: Low Risk  (4/22/2025)    Patient History     Smoking Tobacco Use: Never     Smokeless Tobacco Use: Never     Passive Exposure: Never           --- Follow-up in 2 months or sooner for medication management of oral use of hydrocodone--- follow-up will be with Dr. Pimentel and then she will return to my schedule.  --- Continue hydrocodone 10 mg once daily as needed.  The patient understands that #30 tablets must last her at least 30 days.  She still has #13 tablets which were counted on the office visit today remaining and I have asked her to contact the office when ready for her next refill of medication.  --- Continue intrathecal pain pump therapy with home health agency [facility].      The  urine drug screen confirmation from 1/29/2025 has been reviewed and the result is appropriate based on patient history and JULIA report     The patient signed an updated copy of the controlled substance agreement on 1/29/2025.      JULIA REPORT  As part of the patient's treatment plan, I am prescribing controlled substances. The patient has been made aware of appropriate use of such medications, including potential risk of somnolence, limited ability to drive and/or work safely, and the potential for dependence or overdose. It has also been made clear that these medications are for use by this patient only, without concomitant use of alcohol or other substances unless prescribed.     Patient has completed prescribing agreement detailing terms of continued prescribing of controlled substances, including monitoring JULIA reports, urine drug screening, and pill counts if necessary. The patient is aware that inappropriate use will results in cessation of prescribing such medications.    As the clinician, I personally reviewed the JULIA from 4/22/25 while the patient was in the office today.    History and physical exam exhibit continued safe and appropriate use of controlled substances.     Dictated utilizing Dragon dictation.

## 2025-04-22 NOTE — PROGRESS NOTES
Date of Office Visit: 2025  Encounter Provider: Jonathan Coneth MD  Place of Service: [facility] CARDIOLOGY [place]  Patient Name: Gladys Garrison  :1960  Chirag Robles DO    Chief Complaint   Patient presents with    Follow-up     Edema in lower extremities; Echo 3/18/25     History of Present Illness--    Ms. Sommers is a 64-year-old female with a past medical history of HTN, HLD, pulm hypertension, history of congenital heart disease with tetralogy of Fallot with surgical repaired VSD, complete heart block with dual-chamber pacemaker, DM2, valvular heart disease and congestion with technology with both pulmonic and tricuspid valve abnormalities who comes in for follow-up visit.  She has been experiencing significant swelling in her feet since her hospitalization for sepsis, which limits her choice of footwear. Despite taking Lasix, the swelling persists. She describes her feet as swelling severe and experiences shortness of breath with minimal exertion, such as walking 30 feet in a grocery store.  Currently only taking Lasix 20 mg p.o. daily.  She recently underwent a GREGORY after 6 weeks of antibiotics for group B strep bacteremia showed no evidence of endocarditis however did show moderate tricuspid regurgitation.  She also had a recent limited transthoracic echocardiogram which showed normal LVEF, moderate to severe tricuspid regurgitation with moderately reduced RV systolic function.  Blood cultures were normal.    She mentions being prescribed SGLT2i however has not been taking this she has not been able to afford this.    She says she has been compliant with all of her medications however only takes lisinopril when she feels that her blood pressure is high.  Her blood pressure has been elevated with systolic blood pressures in 140s.  Today her blood pressure in clinic is 137/98.  I recommend her restart lisinopril and take it regularly.  Will restart lisinopril at 20 mg p.o. daily.  On  "exam today she is volume overloaded with 1+ pitting edema bilaterally.  I will increase her Lasix to 20 mg p.o. twice daily.  Given RV dysfunction and moderate tricuspid regurgitation, she should undergo regular surveillance echocardiogram every 6 to 12 months.    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 She drinks a lot of water, keeping a glass by her side at all times, and describes herself as a 'saltaholic,' though she has reduced her salt intake compared to when she was younger.  I recommended limiting water intake to 1 to 2 L a day and dietary discretion with salt intake.    She currently denies having any current symptoms of chest pain, shortness of breath, dyspnea on exertion, palpitations, lightheadedness, dizziness, syncope, headaches, chills, fevers, or  leg swelling.    Prior history--  02/2025    Gladys Garrison is a 64 y.o. female patient of Dr Lopez.  She presented to [facility] with nausea vomiting and weakness on 1/5/2025.  She was found to have group B streptococcus bacteremia of unknown etiology, possibly UTI.     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 She underwent device exchange with Medtronic generator December 2023 (Dr Hammond), Medtronic leads were confirmed for RV and RA leads which were placed back in 2007.  She also has metastatic breast cancer diagnosed 2017 with bone mets diagnosed in 2021, history of bilateral mastectomy.  Under therapy evaluation also pain management from oncology standpoint.   She also history of COVID 2021 she was admitted recently in February 2023 with chest pain atypical nature thought to be secondary to " "metastatic disease.   She has no history of obstructive CAD or coronary intervention.    Last 2D echo March 2023 revealed normal EF 55 to 60% with grade 1 diastolic dysfunction, severely reduced RV systolic function with moderately dilated RV and RA, moderate bileaflet mitral valve thickening with severe TR, mildly elevated pulmonary pressures 35-45, trivial pericardial effusion\".       ID requested GREGORY, which showed no valvular vegetation but questionable thrombus versus vegetation on 1 of PPM leads.  She was treated with 6 weeks of IV antibiotics with plan for repeat GREGORY at 6-week tomás along with repeat blood cultures.     Today, patient states overall she is doing fairly well.  She states that she has been staying with her son and daughter-in-law in [facility] to recover since her discharge.  She states that she has become somewhat deconditioned during this time, having some shortness of breath walking into the office today from the car.  She states she has not been very active at home, family has been concerned she may overdo it.  Patient states that she is feeling overall well and is excited to return to work.  She denies chest pain, orthopnea, PND, palpitations or dizziness/near syncopal episodes. She is doing IV antibiotics through her PICC line at home, today is her last dose.         The 10-year ASCVD risk score (Kyle SHANKAR, et al., 2019) is: 11.2%    Values used to calculate the score:      Age: 64 years      Sex: Female      Is Non- : No      Diabetic: Yes      Tobacco smoker: No      Systolic Blood Pressure: 138 mmHg      Is BP treated: Yes      HDL Cholesterol: 51 mg/dL      Total Cholesterol: 171 mg/dL       Past Medical History:   Diagnosis Date    Allergic     Bone cancer     METASTATIC BONE; stage 4    Bradycardia     SECONDARY TO ABLATION    Breast cancer 2017    mets to lymph nodes; did not do radiation    Cancer of unknown origin     Compression fx, thoracic spine, " open, initial encounter     Coronary artery disease     COVID-19 2021    Diabetes mellitus     Heart defect, congenital 1960    tratology of flow (spelling?)    Heart murmur     Hepatitis C     RESOLVED WITH MEDICATION    Hyperlipidemia     Hypertension     Leaky heart valve     tricuspid & mitral valves leak; not yet enough to warrent another open heart surgery    Obesity (BMI 30-39.9) 2021    Pacemaker     dependent, because of ablations to treat tachycardia    Rib fracture     Per patient    Sepsis 2025    Sleep apnea     no machine    Tachycardia     ATRIAL    Type 2 diabetes mellitus 2017         Past Surgical History:   Procedure Laterality Date    BACK SURGERY      neck X 2    BREAST RECONSTRUCTION Bilateral     CARDIAC ABLATION       atrial tachycardia x 5 ablations     CARDIAC CATHETERIZATION      CARDIAC ELECTROPHYSIOLOGY PROCEDURE N/A 2023    Procedure: Pacemaker RV lead insertion with generator change out. Keelvar;  Surgeon: Steven Hammond MD;  Location: [facility] CATH INVASIVE LOCATION;  Service: Cardiovascular;  Laterality: N/A;    CARDIAC SURGERY      stent placed in aorta    CARDIAC SURGERY      6 surgeries as baby     CERVICAL FUSION ANTERIOR WITH ARTIFICIAL DISCECTOMY IMPLANTATION N/A 2020    Procedure: C4 VERTEBRECTOMY AND ANTERIOR CERIVCAL DISCECTOMY WITH FUSION OF CERVICAL THREE THROUGH FIVE WITH REMOVAL OF HARDWARE C5-C6;  Surgeon: Mark Kaba MD;  Location: [facility] MAIN OR;  Service: Neurosurgery;  Laterality: N/A;     SECTION      x2    COLONOSCOPY N/A 10/23/2020    Procedure: COLONOSCOPY WITH POLYPECTOMY X6;  Surgeon: Stanley Bourne MD;  Location: [facility] ENDOSCOPY;  Service: Gastroenterology;  Laterality: N/A;  POLYPS, INTERNAL HEMORRHOIDS    ENDOMETRIAL ABLATION      REMOVAL SCAR TISSUE UTERINE    ENDOSCOPY N/A 10/23/2020    Procedure: ESOPHAGOGASTRODUODENOSCOPY WITH BIOPSY X 1 AREA;  Surgeon: Stanley Bourne MD;   "Location: [facility] ENDOSCOPY;  Service: Gastroenterology;  Laterality: N/A;  GASTRITIS, ESOPHAGITIS, HIATAL HERNIA    INSERT / REPLACE / REMOVE PACEMAKER      KNEE SURGERY  2000    MASTECTOMY Bilateral     NECK SURGERY      PACEMAKER IMPLANTATION      Medtronic    PAIN PUMP INSERTION/REVISION N/A 04/05/2022    Procedure: PAIN PUMP INSERTION AND INTRATHECAL CATHETER PLACEMENT;  Surgeon: Chuck Hunter MD;  Location: [facility] MAIN OR;  Service: Pain Management;  Laterality: N/A;           Current Outpatient Medications:     fexofenadine (ALLEGRA) 180 MG tablet, Take 1 tablet by mouth Daily., Disp: , Rfl:     furosemide (LASIX) 20 MG tablet, Take 1 tablet by mouth Daily for 30 days., Disp: 30 tablet, Rfl: 0    HYDROcodone-acetaminophen (NORCO)  MG per tablet, Take 1 tablet by mouth Daily As Needed for Moderate Pain., Disp: 30 tablet, Rfl: 0    insulin NPH-insulin regular (humuLIN 70/30,novoLIN 70/30) (70-30) 100 UNIT/ML injection, Inject 5 Units under the skin into the appropriate area as directed Every Evening. If BS over 180, Disp: 15 mL, Rfl: 3    lisinopril (PRINIVIL,ZESTRIL) 2.5 MG tablet, Take 1 tablet by mouth Daily., Disp: 30 tablet, Rfl: 0    morphine 5 mg/mL, by Intrathecal route Continuous., Disp: 20 mL, Rfl: 0    Blood Glucose Monitoring Suppl (Accu-Chek Araceli) device, Use as instructed   To test blood sugar bid, Disp: 1 each, Rfl: 0    Continuous Blood Gluc  (FreeStyle Rajeev 14 Day Lily) device, 1 each Daily., Disp: 1 each, Rfl: 0    Continuous Blood Gluc Sensor (FreeStyle Rajeev 14 Day Sensor) misc, 1 each Daily., Disp: 6 each, Rfl: 3    dapagliflozin (Farxiga) 5 MG tablet tablet, Take 1 tablet by mouth Daily. (Patient not taking: Reported on 4/22/2025), Disp: 30 tablet, Rfl: 0    Insulin Pen Needle (B-D UF III MINI PEN NEEDLES) 31G X 5 MM misc, Use to inject insulin twice daily   DX: E11.9, Disp: 100 each, Rfl: 0    Insulin Syringe-Needle U-100 28G X 1/2\" 1 ML misc, 1 each 2 (Two) Times a " Day., Disp: 100 each, Rfl: 6    Levomefolate Glucosamine (Methyl-Folate) 1700 MCG capsule, Take 1 tablet by mouth Daily. (Patient not taking: Reported on 4/22/2025), Disp: 30 capsule, Rfl: 0      Social History     Socioeconomic History    Marital status:     Number of children: 2   Tobacco Use    Smoking status: Never     Passive exposure: Never    Smokeless tobacco: Never   Vaping Use    Vaping status: Never Used   Substance and Sexual Activity    Alcohol use: Yes     Comment: drinks rarely    Drug use: Never    Sexual activity: Defer         ROS  Negative except for above     Procedures/Imaging results:    Cath:    Stress tests:    CRT-P interrogation 04/2025-   Interpretation Summary         Left ventricular systolic function is normal. Left ventricular ejection fraction appears to be 56 - 60%.    The left ventricular cavity is small in size.    Left ventricular wall thickness is consistent with mild concentric hypertrophy.    Left ventricular diastolic function was indeterminate.    Moderately reduced right ventricular systolic function noted.    The right ventricular cavity is moderate to severely dilated.    The left atrial cavity is mild to moderately dilated.    The right atrial cavity is mildly  dilated.    Moderate to severe tricuspid valve regurgitation is present.    Estimated right ventricular systolic pressure from tricuspid regurgitation is moderately elevated (45-55 mmHg).    Moderate pulmonary hypertension is present.       ECHO: 03/2025- Interpretation Summary         Left ventricular systolic function is normal. Left ventricular ejection fraction appears to be 56 - 60%.    The left ventricular cavity is small in size.    Left ventricular wall thickness is consistent with mild concentric hypertrophy.    Left ventricular diastolic function was indeterminate.    Moderately reduced right ventricular systolic function noted.    The right ventricular cavity is moderate to severely dilated.     "The left atrial cavity is mild to moderately dilated.    The right atrial cavity is mildly  dilated.    Moderate to severe tricuspid valve regurgitation is present.    Estimated right ventricular systolic pressure from tricuspid regurgitation is moderately elevated (45-55 mmHg).    Moderate pulmonary hypertension is present.     GREGORY 02/2025-  Interpretation Summary         Left ventricular systolic function is low normal. Left ventricular ejection fraction appears to be 51 - 55%.    Mildly reduced right ventricular systolic function noted.    The right ventricular cavity is moderately dilated.    The left atrial cavity is moderately dilated.    Moderate to severe tricuspid valve regurgitation is present.    Saline test results are positive. PFO is present.    No obvious source of vegetation noted on the valves or pace lead. There is small echodensity near the RV lead and the VSD patch but not independently mobile and possibly representing fibrous tissue. Clinical correlation recommended.        Objective:    /74 (BP Location: Right arm, Patient Position: Sitting)   Pulse 59   Resp 16   Ht 149.9 cm (59\")   Wt 54 kg (119 lb)   LMP  (LMP Unknown)   SpO2 98%   BMI 24.04 kg/m²     Physical Exam-    Constitutional:       Appearance: Normal and healthy appearance. Well-developed and not in distress.   Eyes:      Conjunctiva/sclera: Conjunctivae normal.      Pupils: Pupils are equal, round, and reactive to light.   HENT:      Head: Normocephalic and atraumatic.      Nose: Nose normal.    Mouth/Throat:      Pharynx: Oropharynx is clear.   Pulmonary:      Effort: Pulmonary effort is normal.      Breath sounds: Mild basilar rales heard  Cardiovascular:      PMI at left midclavicular line. Normal rate. Regular rhythm.      Murmurs: There is no murmur.      No click. No rub.   Pulses:     Intact distal pulses.   Edema:  1+ pitting edema bilaterally lower extremities  Musculoskeletal: Normal range of motion.      " Cervical back: Normal range of motion and neck supple. Skin:     General: Skin is warm and dry.   Neurological:      General: No focal deficit present.      Mental Status: Alert, oriented to person, place, and time and oriented to person, place and time. Mental status is at baseline.         Assessment:     Mild dyspnea on exertion and worsening lower extremity edema  -Likely in the setting of diastolic heart failure/RV systolic dysfunction along with valvular regurgitation and pulmonary hypertension  -Increase Lasix to 20 mg p.o. twice daily  -Patient to limit salt and water in take.   - repeat BMP labs in 1 week     Hypertension  -Blood pressure has been uncontrolled, blood pressure today in clinic is 137/98  Will restart lisinopril at 20 mg p.o. daily.  Patient to continue keeping blood pressure log  - Educated and counseled on heart healthy lifestyle modifications with diet and exercise    History of group B bacteremia status post GREGORY group B bacteremia-no valvular vegetation but questionable Thrombus vs. Vegetation on one of her PPM leads  -s/p recent GREGORY 02/2025 with no obvious signs of vegetation.  PFO present, moderate to severe tricuspid regurgitation    Complete heart block with dual-chamber pacemaker 2007,  generator change    2023, AV device Medtronic dual-chamber with Medtronic leads- s/p PPM upgrade in 2023  History of congenital heart disease with tetralogy of Fallot with surgically repaired VSD  Hyperlipidemia- continue statin   Pulm hypertension / RV systolic dysfunction / valvular heart disease in conjunction with tetralogy with both pulmonic and tricuspid valve abnormalities  -Continue optimizing volume status and blood pressure control.  -Patient should have a repeat complete TTE in 6 months prior to next visit     6-month follow-up    Jonathan Conteh MD     EMR Dragon/Transcription disclaimer:  Much of this encounter note is an electronic transcription/translation of spoken language to printed text.  The electronic translation of spoken language may permit erroneous, or at times, nonsensical words or phrases to be inadvertently transcribed; Although I have reviewed the note for such errors, some may still exist.

## 2025-04-25 ENCOUNTER — TELEPHONE (OUTPATIENT)
Dept: ONCOLOGY | Facility: CLINIC | Age: 65
End: 2025-04-25

## 2025-04-25 NOTE — TELEPHONE ENCOUNTER
Caller: Gladys Garrison    Relationship: Self    Best call back number:   Telephone Information:   Mobile [phone]       What was the call regarding: NEEDS A LETTER SENT TO [facility] FOR HER TO GET DENTURES. EXPLAINING WHAT HER DIAGNOSIS IS AND TREATMENT SHE IS GETTING.     FAX TO: [phone] IN [facility] [place]

## 2025-04-29 ENCOUNTER — TELEPHONE (OUTPATIENT)
Dept: FAMILY MEDICINE CLINIC | Facility: CLINIC | Age: 65
End: 2025-04-29

## 2025-04-29 NOTE — TELEPHONE ENCOUNTER
Letter faxed to Anna at number provided.  Attempted to call pt and let her know.  Voicemail left for pt letting her know that I have faxed letter and received confirmation.  I left my direct number if she has any further questions.

## 2025-04-29 NOTE — TELEPHONE ENCOUNTER
Caller: Gladys Garrison    Relationship to patient: Self    Best call back number:   Telephone Information:   Mobile [phone]         Patient is needing: PATIENT CALLING TO ADVISE THAT [facility] WILL BE FAXING OVER PAPERWORK REGARDING HER DIABETIC SUPPLIES. PLEASE FILL OUT AND FAX BACK ASAP

## 2025-05-01 ENCOUNTER — OFFICE VISIT (OUTPATIENT)
Dept: FAMILY MEDICINE CLINIC | Facility: CLINIC | Age: 65
End: 2025-05-01
Payer: MEDICARE

## 2025-05-01 ENCOUNTER — APPOINTMENT (OUTPATIENT)
Dept: CT IMAGING | Facility: HOSPITAL | Age: 65
End: 2025-05-01
Payer: MEDICARE

## 2025-05-01 ENCOUNTER — HOSPITAL ENCOUNTER (EMERGENCY)
Facility: HOSPITAL | Age: 65
Discharge: HOME | End: 2025-05-01
Attending: EMERGENCY MEDICINE
Payer: MEDICARE

## 2025-05-01 VITALS
HEART RATE: 60 BPM | HEIGHT: 59 IN | BODY MASS INDEX: 24.84 KG/M2 | WEIGHT: 123.2 LBS | SYSTOLIC BLOOD PRESSURE: 124 MMHG | RESPIRATION RATE: 18 BRPM | TEMPERATURE: 98.8 F | DIASTOLIC BLOOD PRESSURE: 62 MMHG | OXYGEN SATURATION: 100 %

## 2025-05-01 VITALS
RESPIRATION RATE: 15 BRPM | HEART RATE: 62 BPM | HEIGHT: 59 IN | DIASTOLIC BLOOD PRESSURE: 64 MMHG | SYSTOLIC BLOOD PRESSURE: 121 MMHG | OXYGEN SATURATION: 97 % | WEIGHT: 123.02 LBS | TEMPERATURE: 99 F | BODY MASS INDEX: 24.8 KG/M2

## 2025-05-01 DIAGNOSIS — D61.818 PANCYTOPENIA: ICD-10-CM

## 2025-05-01 DIAGNOSIS — R50.9 FEVER, UNSPECIFIED FEVER CAUSE: Primary | ICD-10-CM

## 2025-05-01 DIAGNOSIS — Z86.79 HISTORY OF ENDOCARDITIS: ICD-10-CM

## 2025-05-01 DIAGNOSIS — R68.83 CHILLS: ICD-10-CM

## 2025-05-01 DIAGNOSIS — C79.51 MALIGNANT NEOPLASM METASTATIC TO BONE: ICD-10-CM

## 2025-05-01 DIAGNOSIS — C50.919 MALIGNANT NEOPLASM OF FEMALE BREAST, UNSPECIFIED ESTROGEN RECEPTOR STATUS, UNSPECIFIED LATERALITY, UNSPECIFIED SITE OF BREAST: ICD-10-CM

## 2025-05-01 LAB
ALBUMIN SERPL-MCNC: 3.9 G/DL (ref 3.5–5.2)
ALBUMIN/GLOB SERPL: 1.3 G/DL
ALP SERPL-CCNC: 69 U/L (ref 39–117)
ALT SERPL W P-5'-P-CCNC: 11 U/L (ref 1–33)
ANION GAP SERPL CALCULATED.3IONS-SCNC: 8.3 MMOL/L (ref 5–15)
AST SERPL-CCNC: 25 U/L (ref 1–32)
BACTERIA UR QL AUTO: ABNORMAL /HPF
BILIRUB SERPL-MCNC: 0.5 MG/DL (ref 0–1.2)
BILIRUB UR QL STRIP: NEGATIVE
BUN SERPL-MCNC: 22 MG/DL (ref 8–23)
BUN/CREAT SERPL: 17.3 (ref 7–25)
CALCIUM SPEC-SCNC: 9.1 MG/DL (ref 8.6–10.5)
CHLORIDE SERPL-SCNC: 104 MMOL/L (ref 98–107)
CLARITY UR: CLEAR
CO2 SERPL-SCNC: 24.7 MMOL/L (ref 22–29)
COLOR UR: YELLOW
CREAT SERPL-MCNC: 1.27 MG/DL (ref 0.57–1)
D-LACTATE SERPL-SCNC: 0.6 MMOL/L (ref 0.3–2)
DEPRECATED RDW RBC AUTO: 58.5 FL (ref 37–54)
EGFRCR SERPLBLD CKD-EPI 2021: 47.9 ML/MIN/1.73
EOSINOPHIL # BLD MANUAL: 0.01 10*3/MM3 (ref 0–0.4)
EOSINOPHIL NFR BLD MANUAL: 1 % (ref 0.3–6.2)
ERYTHROCYTE [DISTWIDTH] IN BLOOD BY AUTOMATED COUNT: 17 % (ref 12.3–15.4)
GLOBULIN UR ELPH-MCNC: 3.1 GM/DL
GLUCOSE SERPL-MCNC: 130 MG/DL (ref 65–99)
GLUCOSE UR STRIP-MCNC: NEGATIVE MG/DL
HCT VFR BLD AUTO: 26.6 % (ref 34–46.6)
HGB BLD-MCNC: 8 G/DL (ref 12–15.9)
HGB UR QL STRIP.AUTO: NEGATIVE
HOLD SPECIMEN: NORMAL
HYALINE CASTS UR QL AUTO: ABNORMAL /LPF
KETONES UR QL STRIP: ABNORMAL
LEUKOCYTE ESTERASE UR QL STRIP.AUTO: ABNORMAL
LIPASE SERPL-CCNC: 17 U/L (ref 13–60)
LYMPHOCYTES # BLD MANUAL: 0.19 10*3/MM3 (ref 0.7–3.1)
LYMPHOCYTES NFR BLD MANUAL: 4 % (ref 5–12)
MCH RBC QN AUTO: 28.4 PG (ref 26.6–33)
MCHC RBC AUTO-ENTMCNC: 30.1 G/DL (ref 31.5–35.7)
MCV RBC AUTO: 94.3 FL (ref 79–97)
MONOCYTES # BLD: 0.06 10*3/MM3 (ref 0.1–0.9)
NEUTROPHILS # BLD AUTO: 1.21 10*3/MM3 (ref 1.7–7)
NEUTROPHILS NFR BLD MANUAL: 80 % (ref 42.7–76)
NEUTS BAND NFR BLD MANUAL: 2 % (ref 0–5)
NITRITE UR QL STRIP: NEGATIVE
PH UR STRIP.AUTO: 5.5 [PH] (ref 5–8)
PLATELET # BLD AUTO: 52 10*3/MM3 (ref 140–450)
PMV BLD AUTO: 10.7 FL (ref 6–12)
POTASSIUM SERPL-SCNC: 3.9 MMOL/L (ref 3.5–5.2)
PROT SERPL-MCNC: 7 G/DL (ref 6–8.5)
PROT UR QL STRIP: ABNORMAL
RBC # BLD AUTO: 2.82 10*6/MM3 (ref 3.77–5.28)
RBC # UR STRIP: ABNORMAL /HPF
RBC MORPH BLD: NORMAL
REF LAB TEST METHOD: ABNORMAL
SCAN SLIDE: NORMAL
SMALL PLATELETS BLD QL SMEAR: ABNORMAL
SODIUM SERPL-SCNC: 137 MMOL/L (ref 136–145)
SP GR UR STRIP: 1.05 (ref 1–1.03)
SQUAMOUS #/AREA URNS HPF: ABNORMAL /HPF
UROBILINOGEN UR QL STRIP: ABNORMAL
VARIANT LYMPHS NFR BLD MANUAL: 13 % (ref 19.6–45.3)
WBC # UR STRIP: ABNORMAL /HPF
WBC MORPH BLD: NORMAL
WBC NRBC COR # BLD AUTO: 1.47 10*3/MM3 (ref 3.4–10.8)
WHOLE BLOOD HOLD COAG: NORMAL

## 2025-05-01 PROCEDURE — 81001 URINALYSIS AUTO W/SCOPE: CPT | Performed by: EMERGENCY MEDICINE

## 2025-05-01 PROCEDURE — 83690 ASSAY OF LIPASE: CPT | Performed by: EMERGENCY MEDICINE

## 2025-05-01 PROCEDURE — 99285 EMERGENCY DEPT VISIT HI MDM: CPT

## 2025-05-01 PROCEDURE — 25010000002 CEFTRIAXONE PER 250 MG: Performed by: EMERGENCY MEDICINE

## 2025-05-01 PROCEDURE — 25510000001 IOPAMIDOL PER 1 ML: Performed by: EMERGENCY MEDICINE

## 2025-05-01 PROCEDURE — 87086 URINE CULTURE/COLONY COUNT: CPT | Performed by: EMERGENCY MEDICINE

## 2025-05-01 PROCEDURE — 85007 BL SMEAR W/DIFF WBC COUNT: CPT | Performed by: EMERGENCY MEDICINE

## 2025-05-01 PROCEDURE — 74177 CT ABD & PELVIS W/CONTRAST: CPT

## 2025-05-01 PROCEDURE — 96365 THER/PROPH/DIAG IV INF INIT: CPT

## 2025-05-01 PROCEDURE — 36415 COLL VENOUS BLD VENIPUNCTURE: CPT

## 2025-05-01 PROCEDURE — 25810000003 LACTATED RINGERS SOLUTION: Performed by: EMERGENCY MEDICINE

## 2025-05-01 PROCEDURE — 80053 COMPREHEN METABOLIC PANEL: CPT | Performed by: EMERGENCY MEDICINE

## 2025-05-01 PROCEDURE — 87040 BLOOD CULTURE FOR BACTERIA: CPT | Performed by: EMERGENCY MEDICINE

## 2025-05-01 PROCEDURE — 83605 ASSAY OF LACTIC ACID: CPT | Performed by: EMERGENCY MEDICINE

## 2025-05-01 PROCEDURE — 85025 COMPLETE CBC W/AUTO DIFF WBC: CPT | Performed by: EMERGENCY MEDICINE

## 2025-05-01 RX ORDER — SODIUM CHLORIDE 0.9 % (FLUSH) 0.9 %
10 SYRINGE (ML) INJECTION AS NEEDED
Status: DISCONTINUED | OUTPATIENT
Start: 2025-05-01 | End: 2025-05-01 | Stop reason: HOSPADM

## 2025-05-01 RX ORDER — IOPAMIDOL 755 MG/ML
100 INJECTION, SOLUTION INTRAVASCULAR
Status: COMPLETED | OUTPATIENT
Start: 2025-05-01 | End: 2025-05-01

## 2025-05-01 RX ADMIN — SODIUM CHLORIDE, SODIUM LACTATE, POTASSIUM CHLORIDE, CALCIUM CHLORIDE 500 ML: 20; 30; 600; 310 INJECTION, SOLUTION INTRAVENOUS at 17:34

## 2025-05-01 RX ADMIN — IOPAMIDOL 100 ML: 755 INJECTION, SOLUTION INTRAVENOUS at 18:51

## 2025-05-01 RX ADMIN — CEFTRIAXONE 2000 MG: 2 INJECTION, POWDER, FOR SOLUTION INTRAMUSCULAR; INTRAVENOUS at 18:27

## 2025-05-01 RX ADMIN — IOPAMIDOL 100 ML: 755 INJECTION, SOLUTION INTRAVENOUS at 18:17

## 2025-05-01 NOTE — DISCHARGE INSTRUCTIONS
Follow-up with your cancer care doctor tomorrow for your low blood counts and fever and blood cultures.  Return for increased weakness, vomiting, shortness of breath, high fever, increased pain or any other concerns

## 2025-05-01 NOTE — ED PROVIDER NOTES
Subjective   History of Present Illness  64-year-old female describes some lower abdominal pain over the last 1 day associated with feeling feverish and nauseated.  She reports no diarrhea or constipation or dysuria.  She reports no known ill contacts or cough or congestion.  She reports no flank pain.  She did not take her temperature.  She reports no rash or exposures or insect bites  Review of Systems    Past Medical History:   Diagnosis Date    Allergic     Bone cancer     METASTATIC BONE; stage 4    Bradycardia     SECONDARY TO ABLATION    Breast cancer 2017    mets to lymph nodes; did not do radiation    Cancer of unknown origin     Compression fx, thoracic spine, open, initial encounter     Coronary artery disease     COVID-19 09/01/2021    Diabetes mellitus     Heart defect, congenital 1962    tratology of flow (spelling?)    Heart murmur     Hepatitis C     RESOLVED WITH MEDICATION    Hyperlipidemia     Hypertension     Leaky heart valve     tricuspid & mitral valves leak; not yet enough to warrent another open heart surgery    Obesity (BMI 30-39.9) 02/05/2021    Pacemaker     dependent, because of ablations to treat tachycardia    Rib fracture     Per patient    Sepsis 01/2025    Sleep apnea     no machine    Tachycardia     ATRIAL    Type 2 diabetes mellitus 11/2017       Allergies   Allergen Reactions    Promethazine Other (See Comments)     Hyperactive mean    Tape Other (See Comments)     .blisters      Adhesive Tape Rash    Flexeril [Cyclobenzaprine] Rash       Past Surgical History:   Procedure Laterality Date    BACK SURGERY      neck X 2    BREAST RECONSTRUCTION Bilateral     CARDIAC ABLATION       atrial tachycardia x 5 ablations     CARDIAC CATHETERIZATION      CARDIAC ELECTROPHYSIOLOGY PROCEDURE N/A 12/11/2023    Procedure: Pacemaker RV lead insertion with generator change out. [employer];  Surgeon: Steven Hammond MD;  Location: [facility] CATH INVASIVE LOCATION;  Service: Cardiovascular;   Laterality: N/A;    CARDIAC SURGERY      stent placed in aorta    CARDIAC SURGERY      6 surgeries as baby     CERVICAL FUSION ANTERIOR WITH ARTIFICIAL DISCECTOMY IMPLANTATION N/A 2020    Procedure: C4 VERTEBRECTOMY AND ANTERIOR CERIVCAL DISCECTOMY WITH FUSION OF CERVICAL THREE THROUGH FIVE WITH REMOVAL OF HARDWARE C5-C6;  Surgeon: Mark Kaba MD;  Location: [facility] MAIN OR;  Service: Neurosurgery;  Laterality: N/A;     SECTION      x2    COLONOSCOPY N/A 10/23/2020    Procedure: COLONOSCOPY WITH POLYPECTOMY X6;  Surgeon: Stanley Bourne MD;  Location: [facility] ENDOSCOPY;  Service: Gastroenterology;  Laterality: N/A;  POLYPS, INTERNAL HEMORRHOIDS    ENDOMETRIAL ABLATION      REMOVAL SCAR TISSUE UTERINE    ENDOSCOPY N/A 10/23/2020    Procedure: ESOPHAGOGASTRODUODENOSCOPY WITH BIOPSY X 1 AREA;  Surgeon: Stanley Bourne MD;  Location: [facility] ENDOSCOPY;  Service: Gastroenterology;  Laterality: N/A;  GASTRITIS, ESOPHAGITIS, HIATAL HERNIA    INSERT / REPLACE / REMOVE PACEMAKER      KNEE SURGERY      MASTECTOMY Bilateral     NECK SURGERY      PACEMAKER IMPLANTATION      [employer]    PAIN PUMP INSERTION/REVISION N/A 2022    Procedure: PAIN PUMP INSERTION AND INTRATHECAL CATHETER PLACEMENT;  Surgeon: Chuck Hunter MD;  Location: [facility] MAIN OR;  Service: Pain Management;  Laterality: N/A;       Family History   Problem Relation Age of Onset    Heart disease Mother     Stroke Mother     Lung cancer Mother     Aneurysm Father     Diabetes Sister     No Known Problems Brother     No Known Problems Brother     Diabetes type I Half-Sister     Thyroid cancer Half-Sister     Cancer Maternal Aunt     Heart attack Sister     Thyroid disease Sister     No Known Problems Sister        Social History     Socioeconomic History    Marital status:     Number of children: 2   Tobacco Use    Smoking status: Never     Passive exposure: Never    Smokeless tobacco: Never   Vaping  "Use    Vaping status: Never Used   Substance and Sexual Activity    Alcohol use: Yes     Comment: drinks rarely    Drug use: Never    Sexual activity: Defer       Prior to Admission medications    Medication Sig Start Date End Date Taking? Authorizing Provider   Abemaciclib (Verzenio) 100 MG tablet Take  by mouth. BID    Magdalene Russell MD   Blood Glucose Monitoring Suppl (Accu-Chek Araceli) device Use as instructed   To test blood sugar bid 10/25/21   Angelica Rodriguez MD   Continuous Blood Gluc  (FreeStyle Rajeev 14 Day [place]) device 1 each Daily. 6/19/23   Chirag Robles DO   Continuous Blood Gluc Sensor (FreeStyle Rajeev 14 Day Sensor) misc 1 each Daily. 6/19/23   Chirag Robles DO   dapagliflozin (Farxiga) 5 MG tablet tablet Take 1 tablet by mouth Daily. 3/18/25   Canelo Unger MD   fexofenadine (ALLEGRA) 180 MG tablet Take 1 tablet by mouth Daily. 4/10/25 7/9/25  Magdalene Russell MD   furosemide (LASIX) 20 MG tablet Take 1 tablet by mouth 2 (Two) Times a Day for 90 days. 4/22/25 7/21/25  Jonathan Conteh MD   HYDROcodone-acetaminophen (NORCO)  MG per tablet Take 1 tablet by mouth Daily As Needed for Moderate Pain. 4/2/25   Alda Adkins APRN   ibuprofen (ADVIL,MOTRIN) 800 MG tablet Take 1 tablet by mouth Every 8 (Eight) Hours As Needed for Mild Pain. 4/22/25   Kumar Jarrett PA   insulin NPH-insulin regular (humuLIN 70/30,novoLIN 70/30) (70-30) 100 UNIT/ML injection Inject 5 Units under the skin into the appropriate area as directed Every Evening. If BS over 180 10/2/24   Chirag Robles DO   Insulin Pen Needle (B-D UF III MINI PEN NEEDLES) 31G X 5 MM misc Use to inject insulin twice daily   DX: E11.9 10/2/24   Chirag Robles DO   Insulin Syringe-Needle U-100 28G X 1/2\" 1 ML misc 1 each 2 (Two) Times a Day. 11/18/22   Chirag Robles DO   Levomefolate Glucosamine (Methyl-Folate) 1700 MCG capsule Take 1 tablet by mouth Daily. 3/18/25   " "Canelo Unger MD   lisinopril (PRINIVIL,ZESTRIL) 20 MG tablet Take 1 tablet by mouth Daily. 4/22/25   Jonathan Conteh MD   morphine 5 mg/mL by Intrathecal route Continuous. 4/7/25   Geovnai Pimentel MD     /64   Pulse 62   Temp 99 °F (37.2 °C) (Oral)   Resp 15   Ht 149.9 cm (59\")   Wt 55.8 kg (123 lb 0.3 oz)   LMP  (LMP Unknown)   SpO2 97%   BMI 24.85 kg/m²       Objective   Physical Exam  General: Well-developed , no acute distress, alert and appropriate  Eyes: Conjunctival normal, sclera nonicteric  HEENT: Mucous membranes moist, no mucosal swelling  Neck: Supple, no nuchal rigidity, no JVD  Respirations: Respirations nonlabored, equal breath sounds bilaterally, clear lungs  Heart regular rate and rhythm, no murmurs rubs or gallops,   Abdomen soft, mild tenderness palpation in lower abdomen, no rebound or guarding, nondistended, no hepatosplenomegaly, no hernia, no mass, normal bowel sounds, no CVA tenderness  Extremities no clubbing cyanosis or edema, calves are symmetric and nontender  Neuro cranial nerves grossly intact, no focal limb deficits  Psych oriented, pleasant affect  Skin no rash, brisk cap refill  Procedures           ED Course  Results for orders placed or performed during the hospital encounter of 05/01/25   Comprehensive Metabolic Panel    Collection Time: 05/01/25  5:22 PM    Specimen: Arm, Right; Blood   Result Value Ref Range    Glucose 130 (H) 65 - 99 mg/dL    BUN 22 8 - 23 mg/dL    Creatinine 1.27 (H) 0.57 - 1.00 mg/dL    Sodium 137 136 - 145 mmol/L    Potassium 3.9 3.5 - 5.2 mmol/L    Chloride 104 98 - 107 mmol/L    CO2 24.7 22.0 - 29.0 mmol/L    Calcium 9.1 8.6 - 10.5 mg/dL    Total Protein 7.0 6.0 - 8.5 g/dL    Albumin 3.9 3.5 - 5.2 g/dL    ALT (SGPT) 11 1 - 33 U/L    AST (SGOT) 25 1 - 32 U/L    Alkaline Phosphatase 69 39 - 117 U/L    Total Bilirubin 0.5 0.0 - 1.2 mg/dL    Globulin 3.1 gm/dL    A/G Ratio 1.3 g/dL    BUN/Creatinine Ratio 17.3 7.0 - 25.0    Anion Gap 8.3 " 5.0 - 15.0 mmol/L    eGFR 47.9 (L) >60.0 mL/min/1.73   Lipase    Collection Time: 05/01/25  5:22 PM    Specimen: Arm, Right; Blood   Result Value Ref Range    Lipase 17 13 - 60 U/L   CBC Auto Differential    Collection Time: 05/01/25  5:22 PM    Specimen: Arm, Right; Blood   Result Value Ref Range    WBC 1.47 (C) 3.40 - 10.80 10*3/mm3    RBC 2.82 (L) 3.77 - 5.28 10*6/mm3    Hemoglobin 8.0 (L) 12.0 - 15.9 g/dL    Hematocrit 26.6 (L) 34.0 - 46.6 %    MCV 94.3 79.0 - 97.0 fL    MCH 28.4 26.6 - 33.0 pg    MCHC 30.1 (L) 31.5 - 35.7 g/dL    RDW 17.0 (H) 12.3 - 15.4 %    RDW-SD 58.5 (H) 37.0 - 54.0 fl    MPV 10.7 6.0 - 12.0 fL    Platelets 52 (L) 140 - 450 10*3/mm3   Scan Slide    Collection Time: 05/01/25  5:22 PM    Specimen: Arm, Right; Blood   Result Value Ref Range    Scan Slide     Manual Differential    Collection Time: 05/01/25  5:22 PM    Specimen: Arm, Right; Blood   Result Value Ref Range    Neutrophil % 80.0 (H) 42.7 - 76.0 %    Lymphocyte % 13.0 (L) 19.6 - 45.3 %    Monocyte % 4.0 (L) 5.0 - 12.0 %    Eosinophil % 1.0 0.3 - 6.2 %    Bands %  2.0 0.0 - 5.0 %    Neutrophils Absolute 1.21 (L) 1.70 - 7.00 10*3/mm3    Lymphocytes Absolute 0.19 (L) 0.70 - 3.10 10*3/mm3    Monocytes Absolute 0.06 (L) 0.10 - 0.90 10*3/mm3    Eosinophils Absolute 0.01 0.00 - 0.40 10*3/mm3    RBC Morphology Normal Normal    WBC Morphology Normal Normal    Platelet Estimate Decreased Normal   Gold Top - SST    Collection Time: 05/01/25  5:22 PM   Result Value Ref Range    Extra Tube Hold for add-ons.    Light Blue Top    Collection Time: 05/01/25  5:22 PM   Result Value Ref Range    Extra Tube Hold for add-ons.    POC Lactate    Collection Time: 05/01/25  5:26 PM    Specimen: Blood   Result Value Ref Range    Lactate 0.6 0.3 - 2.0 mmol/L   Urinalysis With Culture If Indicated - Urine, Clean Catch    Collection Time: 05/01/25  7:19 PM    Specimen: Urine, Clean Catch   Result Value Ref Range    Color, UA Yellow Yellow, Straw    Appearance,  UA Clear Clear    pH, UA 5.5 5.0 - 8.0    Specific Gravity, UA 1.055 (H) 1.005 - 1.030    Glucose, UA Negative Negative    Ketones, UA Trace (A) Negative    Bilirubin, UA Negative Negative    Blood, UA Negative Negative    Protein, UA 30 mg/dL (1+) (A) Negative    Leuk Esterase, UA Moderate (2+) (A) Negative    Nitrite, UA Negative Negative    Urobilinogen, UA 1.0 E.U./dL 0.2 - 1.0 E.U./dL   Urinalysis, Microscopic Only - Urine, Clean Catch    Collection Time: 05/01/25  7:19 PM    Specimen: Urine, Clean Catch   Result Value Ref Range    RBC, UA 0-2 None Seen, 0-2 /HPF    WBC, UA 21-50 (A) None Seen, 0-2 /HPF    Bacteria, UA Trace (A) None Seen /HPF    Squamous Epithelial Cells, UA 3-6 (A) None Seen, 0-2 /HPF    Hyaline Casts, UA None Seen None Seen /LPF    Methodology Automated Microscopy      *Note: Due to a large number of results and/or encounters for the requested time period, some results have not been displayed. A complete set of results can be found in Results Review.     CT Abdomen Pelvis With Contrast  Result Date: 5/1/2025  Trace abdominal ascites with streaky changes in the subcutaneous fat and small bowel mesentery. Associated with cardiomegaly, findings are likely reflective of increased intrathoracic pressures. Electronically Signed: Sudhir Montoya MD  5/1/2025 6:43 PM EDT  Workstation ID: [id number]      ED Course as of 05/01/25 1957   Thu May 01, 2025   1950 Lab findings discussed with patient.  I offered admission with plan for hematology consultation.  She was eager to go home and wants to go home and follow-up with the cancer care center tomorrow as an outpatient.  She had received a dose of Rocephin tonight.  No clear source of fever was identified.  She does have some chronic pancytopenia due to her chemotherapy.  She was adamant that she was not having symptoms of urinary tract infection  [SH]      ED Course User Index  [SH] Izaiah Palacios MD                                                        Medical Decision Making  Differential diagnosis including diverticulitis, sepsis, urinary tract infection, peritonitis, ischemic bowel    Patient has a benign abdominal examination no signs of peritonitis or acute abdomen.  She was ordered IV Rocephin for possible bacteremia with reported history of sepsis.  She was afebrile during the emergency room course does have some chronic pancytopenia.  Ischemic bowel felt to be unlikely she is not having pain out of proportion to exam.  She was ordered some IV fluids.  She was eager for discharge and wanted to follow-up with cancer care.  Blood cultures ordered and pending.  Urinalysis shows some squamous and epithelial cells likely some contamination, patient states she does not have a urinary tract infection.  She is given warning signs for return and strictly advised to the importance of early follow-up with her cancer care tomorrow morning.    Problems Addressed:  Fever, unspecified fever cause: complicated acute illness or injury  Pancytopenia: complicated acute illness or injury    Amount and/or Complexity of Data Reviewed  Labs: ordered. Decision-making details documented in ED Course.     Details: CBC shows some pancytopenia, comparison made to recent and values are similar to her chronic pancytopenia related to her chemotherapy, lactate normal, comprehensive metabolic panel shows a mild renal insufficiency.  Radiology: ordered and independent interpretation performed.     Details: My independent interpretation of CT abdomen pelvis no free air or obstruction or acute inflammatory process    Risk  Prescription drug management.        Final diagnoses:   Fever, unspecified fever cause   Pancytopenia       ED Disposition  ED Disposition       ED Disposition   Discharge    Condition   Stable    Comment   --               No follow-up provider specified.       Medication List      No changes were made to your prescriptions during this visit.            Izaiah Palacios  MD RAD[facility]  05/01/25 1957       Izaiah Palacios MD  05/01/25 1957

## 2025-05-01 NOTE — PROGRESS NOTES
Chief Complaint  Fever, Chills, and Vomiting    HPI:    Gladys Garrison presents to [facility]    Patient is a 64-year-old female with a history of hypertension, hyperlipidemia, sick sinus syndrome status post pacemaker placement, complete heart block, CAD, tetralogy of Fallot, type 2 diabetes, endocarditis, metastatic breast cancer presenting for evaluation fever, chills, and vomiting.     Patient reports that she has been having fever, chills, vomiting that started last night suddenly. She had felt well prior to onset of symptoms. She has more or less spit up rather than vomiting. She has not measured a fever but has had subjective fever and frequent chills. She is trying to stay hydrated. Appetite is poor. She has also been having lower abdominal discomfort especially with urinating. Denies diarrhea, constipation, hematochezia, melena, jaundice. Denies dysuria, urgency, frequency, hematuria, flank pain. Denies new sick contacts, food/water exposures, travel, or antibiotics. Blood sugars are not usually measured. She is out of readers and needed to have paperwork completed.  Symptoms somewhat similar to previously when she recently had sepsis and required admission. CT chest, abdomen, and pelvis recently performed for breast cancer surveillance.  Imaging notable for new small quantity ascites within the abdomen or pelvis.  No discrete peritoneal or omental nodularity seen.  Stable splenic enlargement also noted.  No evidence of recurrent malignancy or metastatic disease within the chest.      Review of Systems:  ROS negative unless otherwise noted in HPI above.    Past Medical History:   Diagnosis Date    Allergic     Bone cancer     METASTATIC BONE; stage 4    Bradycardia     SECONDARY TO ABLATION    Breast cancer 2017    mets to lymph nodes; did not do radiation    Cancer of unknown origin     Compression fx, thoracic spine, open, initial encounter     Coronary artery disease      COVID-19 09/01/2021    Diabetes mellitus     Heart defect, congenital 1962    tratology of flow (spelling?)    Heart murmur     Hepatitis C     RESOLVED WITH MEDICATION    Hyperlipidemia     Hypertension     Leaky heart valve     tricuspid & mitral valves leak; not yet enough to warrent another open heart surgery    Obesity (BMI 30-39.9) 02/05/2021    Pacemaker     dependent, because of ablations to treat tachycardia    Rib fracture     Per patient    Sepsis 01/2025    Sleep apnea     no machine    Tachycardia     ATRIAL    Type 2 diabetes mellitus 11/2017         Current Outpatient Medications:     Abemaciclib (Verzenio) 100 MG tablet, Take  by mouth. BID, Disp: , Rfl:     Blood Glucose Monitoring Suppl (Accu-Chek Araceli) device, Use as instructed   To test blood sugar bid, Disp: 1 each, Rfl: 0    Continuous Blood Gluc  (FreeStyle Rajeev 14 Day [place]) device, 1 each Daily., Disp: 1 each, Rfl: 0    Continuous Blood Gluc Sensor (FreeStyle Rajeev 14 Day Sensor) misc, 1 each Daily., Disp: 6 each, Rfl: 3    dapagliflozin (Farxiga) 5 MG tablet tablet, Take 1 tablet by mouth Daily., Disp: 30 tablet, Rfl: 0    fexofenadine (ALLEGRA) 180 MG tablet, Take 1 tablet by mouth Daily., Disp: , Rfl:     furosemide (LASIX) 20 MG tablet, Take 1 tablet by mouth 2 (Two) Times a Day for 90 days., Disp: 60 tablet, Rfl: 2    HYDROcodone-acetaminophen (NORCO)  MG per tablet, Take 1 tablet by mouth Daily As Needed for Moderate Pain., Disp: 30 tablet, Rfl: 0    ibuprofen (ADVIL,MOTRIN) 800 MG tablet, Take 1 tablet by mouth Every 8 (Eight) Hours As Needed for Mild Pain., Disp: 30 tablet, Rfl: 0    insulin NPH-insulin regular (humuLIN 70/30,novoLIN 70/30) (70-30) 100 UNIT/ML injection, Inject 5 Units under the skin into the appropriate area as directed Every Evening. If BS over 180, Disp: 15 mL, Rfl: 3    Insulin Pen Needle (B-D UF III MINI PEN NEEDLES) 31G X 5 MM misc, Use to inject insulin twice daily   DX: E11.9, Disp:  "100 each, Rfl: 0    Insulin Syringe-Needle U-100 28G X 1/2\" 1 ML misc, 1 each 2 (Two) Times a Day., Disp: 100 each, Rfl: 6    Levomefolate Glucosamine (Methyl-Folate) 1700 MCG capsule, Take 1 tablet by mouth Daily., Disp: 30 capsule, Rfl: 0    lisinopril (PRINIVIL,ZESTRIL) 20 MG tablet, Take 1 tablet by mouth Daily., Disp: 30 tablet, Rfl: 3    morphine 5 mg/mL, by Intrathecal route Continuous., Disp: 20 mL, Rfl: 0    Social History     Socioeconomic History    Marital status:     Number of children: 2   Tobacco Use    Smoking status: Never     Passive exposure: Never    Smokeless tobacco: Never   Vaping Use    Vaping status: Never Used   Substance and Sexual Activity    Alcohol use: Yes     Comment: drinks rarely    Drug use: Never    Sexual activity: Defer        Objective   Vital Signs:  /62   Pulse 60   Temp 98.8 °F (37.1 °C) (Oral)   Resp 18   Ht 149.9 cm (59\")   Wt 55.9 kg (123 lb 3.2 oz)   SpO2 100%   BMI 24.88 kg/m²   Estimated body mass index is 24.88 kg/m² as calculated from the following:    Height as of this encounter: 149.9 cm (59\").    Weight as of this encounter: 55.9 kg (123 lb 3.2 oz).    Physical Exam:  General: Ill, tired-appearing patient, no apparent distress  HEENT: No posterior pharynx erythema, no tonsillar erythema or exudates, normal external auditory canals, TM normal without bulging or erythema  Neck: No cervical lymphadenopathy  Cardiac: Regular rate and rhythm, normal S1/S2, no murmur, rubs or gallops, no lower extremity edema  Lungs: Clear to auscultation bilaterally, no crackles or wheezes  Abdomen: Soft, mild tenderness to palpation of suprapubic region  Skin: No significant rashes or lesions  MSK: Grossly normal tone and strength  Neuro: Alert and oriented x3, CN II-XII grossly intact  Psych: Appropriate mood and affect    Assessment and Plan:    (R50.9) Fever, unspecified fever cause  (R68.83) Chills  (Z86.79) History of endocarditis  (C79.51) Malignant neoplasm " metastatic to bone  (C50.919) Malignant neoplasm of female breast  Assessment: Patient with significant medical history including metastatic breast cancer currently on Verzenio, recent endocarditis presenting with fever, chills, and vomiting for the past day.  Vitals overall stable.  Exam notable predominantly for lower abdominal tenderness.  Patient overall is ill-appearing and contemplating presenting to ED.  After discussion, patient planning on presenting to [facility] ED to expedite potential workup including labs, imaging, and treatment as appropriate given ongoing symptoms and significant comorbidities.  Plan:  - Patient to present to [facility] ED for further evaluation      Patient was given instructions and counseling regarding her condition or for health maintenance advice. Please see specific information pulled into the AVS if appropriate.       Dr Blaise Arroyo   Internal Medicine Physician  [facility]--[address], [address]  [place] [address]

## 2025-05-03 LAB — BACTERIA SPEC AEROBE CULT: NO GROWTH

## 2025-05-06 LAB
BACTERIA SPEC AEROBE CULT: NORMAL
BACTERIA SPEC AEROBE CULT: NORMAL

## 2025-05-07 ENCOUNTER — TELEPHONE (OUTPATIENT)
Dept: ONCOLOGY | Facility: CLINIC | Age: 65
End: 2025-05-07
Payer: MEDICARE

## 2025-05-07 NOTE — TELEPHONE ENCOUNTER
Left detailed voicemail to return call to reschedule her 04/29/25 appointment for MD to go over results

## 2025-05-13 ENCOUNTER — OFFICE VISIT (OUTPATIENT)
Dept: FAMILY MEDICINE CLINIC | Facility: CLINIC | Age: 65
End: 2025-05-13
Payer: MEDICARE

## 2025-05-13 ENCOUNTER — LAB (OUTPATIENT)
Dept: FAMILY MEDICINE CLINIC | Facility: CLINIC | Age: 65
End: 2025-05-13
Payer: MEDICARE

## 2025-05-13 VITALS
HEART RATE: 84 BPM | DIASTOLIC BLOOD PRESSURE: 68 MMHG | BODY MASS INDEX: 25.16 KG/M2 | HEIGHT: 59 IN | RESPIRATION RATE: 18 BRPM | SYSTOLIC BLOOD PRESSURE: 153 MMHG | OXYGEN SATURATION: 100 % | WEIGHT: 124.8 LBS

## 2025-05-13 DIAGNOSIS — R53.83 OTHER FATIGUE: Primary | ICD-10-CM

## 2025-05-13 DIAGNOSIS — R06.02 SHORTNESS OF BREATH: ICD-10-CM

## 2025-05-13 DIAGNOSIS — R42 DIZZINESS: ICD-10-CM

## 2025-05-13 DIAGNOSIS — Z79.4 CONTROLLED TYPE 2 DIABETES MELLITUS WITHOUT COMPLICATION, WITH LONG-TERM CURRENT USE OF INSULIN: ICD-10-CM

## 2025-05-13 DIAGNOSIS — R60.0 LOWER EXTREMITY EDEMA: ICD-10-CM

## 2025-05-13 DIAGNOSIS — E11.9 CONTROLLED TYPE 2 DIABETES MELLITUS WITHOUT COMPLICATION, WITH LONG-TERM CURRENT USE OF INSULIN: ICD-10-CM

## 2025-05-13 PROCEDURE — 80053 COMPREHEN METABOLIC PANEL: CPT | Performed by: STUDENT IN AN ORGANIZED HEALTH CARE EDUCATION/TRAINING PROGRAM

## 2025-05-13 PROCEDURE — 36415 COLL VENOUS BLD VENIPUNCTURE: CPT | Performed by: STUDENT IN AN ORGANIZED HEALTH CARE EDUCATION/TRAINING PROGRAM

## 2025-05-13 PROCEDURE — 85025 COMPLETE CBC W/AUTO DIFF WBC: CPT | Performed by: STUDENT IN AN ORGANIZED HEALTH CARE EDUCATION/TRAINING PROGRAM

## 2025-05-13 RX ORDER — SYRINGE-NEEDLE,INSULIN,0.5 ML 27GX1/2"
1 SYRINGE, EMPTY DISPOSABLE MISCELLANEOUS 2 TIMES DAILY
Qty: 100 EACH | Refills: 6 | Status: SHIPPED | OUTPATIENT
Start: 2025-05-13

## 2025-05-13 NOTE — PROGRESS NOTES
"Chief Complaint  Chief Complaint   Patient presents with    Fatigue    Shortness of Breath    Dizziness    Edema     Bilateral feet and legs       Subjective        Gladys Garrison is a 64 y.o. female who presents to [facility].  History of Present Illness    History of Present Illness  The patient is a 64-year-old female presenting with persistent fatigue, dyspnea, and lightheadedness for one week. She experiences near-syncope when transitioning from sitting to standing and during ambulation, preventing her from working. She has not adhered to her medication regimen due to illness. Dyspnea occurs with minimal exertion, such as going to the bathroom or climbing stairs. Diuretic use twice daily has not alleviated leg and foot edema. She was evaluated in the ER for fever a few weeks ago and prescribed antibiotics. An echocardiogram was performed a couple of months ago due to a history of sepsis. She reports abdominal pain without changes in bowel movements, normal urination with occasional nocturnal difficulty initiating. History of leukopenia and thrombocytopenia. Blood glucose levels fluctuate significantly, requiring insulin. She has not taken Farxiga and consumes fewer calories, occasionally eating cookies when hypoglycemic.    Objective   /68   Pulse 84   Resp 18   Ht 149.9 cm (59\")   Wt 56.6 kg (124 lb 12.8 oz)   SpO2 100%   BMI 25.21 kg/m²     Estimated body mass index is 25.21 kg/m² as calculated from the following:    Height as of this encounter: 149.9 cm (59\").    Weight as of this encounter: 56.6 kg (124 lb 12.8 oz).     Physical Exam   GEN: In no acute distress, non toxic appearing  HEENT: Pupils equal and reactive to light, sclera clear. Mucous membranes moist. Oropharynx without erythema or exudate.  CV: Regular rate and rhythm  RESP: Lungs clear to auscultation anteriorly and posteriorly in all lung fields bilaterally.     Result Review :            " "  Assessment and Plan     Diagnoses and all orders for this visit:    1. Other fatigue (Primary)  -     XR Chest PA & Lateral; Future  -     CBC Auto Differential  -     Comprehensive Metabolic Panel    2. Shortness of breath  -     XR Chest PA & Lateral; Future  -     CBC Auto Differential  -     Comprehensive Metabolic Panel    3. Dizziness  -     XR Chest PA & Lateral; Future  -     CBC Auto Differential  -     Comprehensive Metabolic Panel    4. Lower extremity edema  -     XR Chest PA & Lateral; Future  -     CBC Auto Differential  -     Comprehensive Metabolic Panel    5. Controlled type 2 diabetes mellitus without complication, with long-term current use of insulin  -     Insulin Syringe-Needle U-100 28G X 1/2\" 1 ML misc; Use 1 each 2 (Two) Times a Day.  Dispense: 100 each; Refill: 6       No clear etiology of symptoms from exam.  Obtain CXR to evaluate for fluid overload.  Echocardiogram from 2 months ago reviewed.    Order CBC to check her hgb which had downtrended to 8.0 in ER on 5/1.  Order CMP to evaluate kidney function, liver function.  Kidney function was elevated at ER on 5/1.    Order insulin syringe / needles for her hyperglycemia.  Update me w/ any changes.    Assessment & Plan        Follow Up   Pending above, update me if any changes     Patient or patient representative verbalized consent for the use of Ambient Listening during the visit with  Chirag Robles DO for chart documentation. 5/13/2025  13:42 EDT  "

## 2025-05-14 LAB
ALBUMIN SERPL-MCNC: 4.2 G/DL (ref 3.5–5.2)
ALBUMIN/GLOB SERPL: 1.1 G/DL
ALP SERPL-CCNC: 74 U/L (ref 39–117)
ALT SERPL W P-5'-P-CCNC: 13 U/L (ref 1–33)
ANION GAP SERPL CALCULATED.3IONS-SCNC: 12 MMOL/L (ref 5–15)
AST SERPL-CCNC: 30 U/L (ref 1–32)
BASOPHILS # BLD AUTO: 0.01 10*3/MM3 (ref 0–0.2)
BASOPHILS NFR BLD AUTO: 0.5 % (ref 0–1.5)
BILIRUB SERPL-MCNC: 0.4 MG/DL (ref 0–1.2)
BUN SERPL-MCNC: 15 MG/DL (ref 8–23)
BUN/CREAT SERPL: 16 (ref 7–25)
CALCIUM SPEC-SCNC: 9 MG/DL (ref 8.6–10.5)
CHLORIDE SERPL-SCNC: 104 MMOL/L (ref 98–107)
CO2 SERPL-SCNC: 23 MMOL/L (ref 22–29)
CREAT SERPL-MCNC: 0.94 MG/DL (ref 0.57–1)
DEPRECATED RDW RBC AUTO: 44.9 FL (ref 37–54)
EGFRCR SERPLBLD CKD-EPI 2021: 68.7 ML/MIN/1.73
EOSINOPHIL # BLD AUTO: 0.01 10*3/MM3 (ref 0–0.4)
EOSINOPHIL NFR BLD AUTO: 0.5 % (ref 0.3–6.2)
ERYTHROCYTE [DISTWIDTH] IN BLOOD BY AUTOMATED COUNT: 13.7 % (ref 12.3–15.4)
GLOBULIN UR ELPH-MCNC: 3.7 GM/DL
GLUCOSE SERPL-MCNC: 151 MG/DL (ref 65–99)
HCT VFR BLD AUTO: 30.9 % (ref 34–46.6)
HGB BLD-MCNC: 10 G/DL (ref 12–15.9)
IMM GRANULOCYTES # BLD AUTO: 0.01 10*3/MM3 (ref 0–0.05)
IMM GRANULOCYTES NFR BLD AUTO: 0.5 % (ref 0–0.5)
LYMPHOCYTES # BLD AUTO: 0.33 10*3/MM3 (ref 0.7–3.1)
LYMPHOCYTES NFR BLD AUTO: 16.1 % (ref 19.6–45.3)
MCH RBC QN AUTO: 29.3 PG (ref 26.6–33)
MCHC RBC AUTO-ENTMCNC: 32.4 G/DL (ref 31.5–35.7)
MCV RBC AUTO: 90.6 FL (ref 79–97)
MONOCYTES # BLD AUTO: 0.09 10*3/MM3 (ref 0.1–0.9)
MONOCYTES NFR BLD AUTO: 4.4 % (ref 5–12)
NEUTROPHILS NFR BLD AUTO: 1.6 10*3/MM3 (ref 1.7–7)
NEUTROPHILS NFR BLD AUTO: 78 % (ref 42.7–76)
NRBC BLD AUTO-RTO: 0 /100 WBC (ref 0–0.2)
PLATELET # BLD AUTO: 75 10*3/MM3 (ref 140–450)
PMV BLD AUTO: 10.9 FL (ref 6–12)
POTASSIUM SERPL-SCNC: 4.3 MMOL/L (ref 3.5–5.2)
PROT SERPL-MCNC: 7.9 G/DL (ref 6–8.5)
RBC # BLD AUTO: 3.41 10*6/MM3 (ref 3.77–5.28)
SODIUM SERPL-SCNC: 139 MMOL/L (ref 136–145)
WBC NRBC COR # BLD AUTO: 2.05 10*3/MM3 (ref 3.4–10.8)

## 2025-05-15 DIAGNOSIS — R53.83 OTHER FATIGUE: ICD-10-CM

## 2025-05-15 DIAGNOSIS — R60.0 LOWER EXTREMITY EDEMA: ICD-10-CM

## 2025-05-15 DIAGNOSIS — R42 DIZZINESS: ICD-10-CM

## 2025-05-15 DIAGNOSIS — R06.02 SHORTNESS OF BREATH: ICD-10-CM

## 2025-05-19 ENCOUNTER — APPOINTMENT (OUTPATIENT)
Dept: GENERAL RADIOLOGY | Facility: HOSPITAL | Age: 65
End: 2025-05-19
Payer: MEDICARE

## 2025-05-19 ENCOUNTER — HOSPITAL ENCOUNTER (EMERGENCY)
Facility: HOSPITAL | Age: 65
Discharge: HOME | End: 2025-05-19
Admitting: EMERGENCY MEDICINE
Payer: MEDICARE

## 2025-05-19 VITALS
WEIGHT: 123.46 LBS | OXYGEN SATURATION: 100 % | TEMPERATURE: 97.8 F | DIASTOLIC BLOOD PRESSURE: 43 MMHG | HEART RATE: 63 BPM | SYSTOLIC BLOOD PRESSURE: 145 MMHG | BODY MASS INDEX: 24.24 KG/M2 | RESPIRATION RATE: 20 BRPM | HEIGHT: 60 IN
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
## 2025-05-19 DIAGNOSIS — S40.012A CONTUSION OF LEFT SHOULDER, INITIAL ENCOUNTER: ICD-10-CM

## 2025-05-19 DIAGNOSIS — S80.02XA CONTUSION OF LEFT KNEE, INITIAL ENCOUNTER: ICD-10-CM

## 2025-05-19 PROCEDURE — 99283 EMERGENCY DEPT VISIT LOW MDM: CPT

## 2025-05-19 PROCEDURE — 73030 X-RAY EXAM OF SHOULDER: CPT

## 2025-05-19 PROCEDURE — 73562 X-RAY EXAM OF KNEE 3: CPT

## 2025-05-19 NOTE — ED PROVIDER NOTES
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 and landed on her left shoulder and twisted her left knee    Patient has a history of breast cancer as well as cancer that has metastasized to the bone she wears a morphine pump and uses Norco daily      Review of Systems   Musculoskeletal:         Left shoulder left knee pain       Past Medical History:   Diagnosis Date    Allergic     Bone cancer     METASTATIC BONE; stage 4    Bradycardia     SECONDARY TO ABLATION    Breast cancer 2017    mets to lymph nodes; did not do radiation    Cancer of unknown origin     Compression fx, thoracic spine, open, initial encounter     Coronary artery disease     COVID-19 09/01/2021    Diabetes mellitus     Heart defect, congenital 1960    tratology of flow (spelling?)    Heart murmur     Hepatitis C     RESOLVED WITH MEDICATION    Hyperlipidemia     Hypertension     Leaky heart valve     tricuspid & mitral valves leak; not yet enough to warrent another open heart surgery    Obesity (BMI 30-39.9) 02/05/2021    Pacemaker     dependent, because of ablations to treat tachycardia    Rib fracture     Per patient    Sepsis 01/2025    Sleep apnea     no machine    Tachycardia     ATRIAL    Type 2 diabetes mellitus 11/2017       Allergies   Allergen Reactions    Promethazine Other (See Comments)     Hyperactive mean    Tape Other (See Comments)     .blisters      Adhesive Tape Rash    Flexeril [Cyclobenzaprine] Rash       Past Surgical History:   Procedure Laterality Date    BACK SURGERY      neck X 2    BREAST RECONSTRUCTION Bilateral     CARDIAC ABLATION       atrial tachycardia x 5 ablations     CARDIAC CATHETERIZATION      CARDIAC ELECTROPHYSIOLOGY PROCEDURE N/A 12/11/2023    Procedure: Pacemaker RV lead insertion with generator change out. [employer];  Surgeon: Steven Hammond MD;   Location: [facility] CATH INVASIVE LOCATION;  Service: Cardiovascular;  Laterality: N/A;    CARDIAC SURGERY      stent placed in aorta    CARDIAC SURGERY      6 surgeries as baby     CERVICAL FUSION ANTERIOR WITH ARTIFICIAL DISCECTOMY IMPLANTATION N/A 2020    Procedure: C4 VERTEBRECTOMY AND ANTERIOR CERIVCAL DISCECTOMY WITH FUSION OF CERVICAL THREE THROUGH FIVE WITH REMOVAL OF HARDWARE C5-C6;  Surgeon: Mark Kaba MD;  Location: [facility] MAIN OR;  Service: Neurosurgery;  Laterality: N/A;     SECTION      x2    COLONOSCOPY N/A 10/23/2020    Procedure: COLONOSCOPY WITH POLYPECTOMY X6;  Surgeon: Stanley Bourne MD;  Location: [facility] ENDOSCOPY;  Service: Gastroenterology;  Laterality: N/A;  POLYPS, INTERNAL HEMORRHOIDS    ENDOMETRIAL ABLATION      REMOVAL SCAR TISSUE UTERINE    ENDOSCOPY N/A 10/23/2020    Procedure: ESOPHAGOGASTRODUODENOSCOPY WITH BIOPSY X 1 AREA;  Surgeon: Stanley Bourne MD;  Location: [facility] ENDOSCOPY;  Service: Gastroenterology;  Laterality: N/A;  GASTRITIS, ESOPHAGITIS, HIATAL HERNIA    INSERT / REPLACE / REMOVE PACEMAKER      KNEE SURGERY      MASTECTOMY Bilateral     NECK SURGERY      PACEMAKER IMPLANTATION      [employer]    PAIN PUMP INSERTION/REVISION N/A 2022    Procedure: PAIN PUMP INSERTION AND INTRATHECAL CATHETER PLACEMENT;  Surgeon: Chuck Hunter MD;  Location: [facility] MAIN OR;  Service: Pain Management;  Laterality: N/A;       Family History   Problem Relation Age of Onset    Heart disease Mother     Stroke Mother     Lung cancer Mother     Aneurysm Father     Diabetes Sister     No Known Problems Brother     No Known Problems Brother     Diabetes type I Half-Sister     Thyroid cancer Half-Sister     Cancer Maternal Aunt     Heart attack Sister     Thyroid disease Sister     No Known Problems Sister        Social History     Socioeconomic History    Marital status:     Number of children: 2   Tobacco Use    Smoking status: Never      Passive exposure: Never    Smokeless tobacco: Never   Vaping Use    Vaping status: Never Used   Substance and Sexual Activity    Alcohol use: Yes     Comment: drinks rarely    Drug use: Never    Sexual activity: Defer           Objective   Physical Exam  Vitals reviewed.   Constitutional:       Appearance: Normal appearance. She is well-developed.   HENT:      Head: Normocephalic and atraumatic.   Eyes:      Conjunctiva/sclera: Conjunctivae normal.      Pupils: Pupils are equal, round, and reactive to light.   Cardiovascular:      Rate and Rhythm: Normal rate and regular rhythm.      Heart sounds: Normal heart sounds.   Pulmonary:      Effort: Pulmonary effort is normal. No respiratory distress.      Breath sounds: Normal breath sounds. No wheezing.   Abdominal:      General: Bowel sounds are normal.      Palpations: Abdomen is soft.      Tenderness: There is no abdominal tenderness.   Musculoskeletal:         General: No deformity.      Right shoulder: Normal.      Left shoulder: Tenderness present. Decreased range of motion. Decreased strength.      Cervical back: Normal range of motion and neck supple.      Right knee: Normal.      Left knee: Swelling and ecchymosis present. Decreased range of motion. Tenderness present.      Comments: Good distal pulse good cap refill distally neurovascularly intact   Skin:     General: Skin is warm and dry.      Capillary Refill: Capillary refill takes less than 2 seconds.   Neurological:      General: No focal deficit present.      Mental Status: She is alert and oriented to person, place, and time.      GCS: GCS eye subscore is 4. GCS verbal subscore is 5. GCS motor subscore is 6.      Motor: No weakness.   Psychiatric:         Mood and Affect: Mood normal.         Behavior: Behavior normal.         Procedures         ACE to the left knee - by nursing staff.   ED Course        /43 (BP Location: Left arm, Patient Position: Sitting)   Pulse 63   Temp 97.8 °F (36.6  "°C) (Oral)   Resp 20   Ht 152.4 cm (60\")   Wt 56 kg (123 lb 7.3 oz)   LMP  (LMP Unknown)   SpO2 100%   BMI 24.11 kg/m²   Labs Reviewed - No data to display  Medications - No data to display  XR Knee 3 View Left  Result Date: 5/19/2025  Impression: Soft tissue prominence but no acute osseous injury to the left knee. Electronically Signed: Natasha Villarreal MD  5/19/2025 4:00 PM EDT  Workstation ID: [id number]    XR Shoulder 2+ View Left  Result Date: 5/19/2025  Impression: 1.No acute process involving the shoulder identified. 2.CHF Electronically Signed: Kashif Villarreal MD  5/19/2025 3:59 PM EDT  Workstation ID: [id number]                                                   Medical Decision Making  Patient is a 64-year-old female who had a fall at work today which is concerning for fracture strain dislocation of the left knee and the left shoulder.  She had above exam and x-rays were obtained and reviewed and found to have no fracture no dislocation the patient was placed in a 6 inch Ace of the left knee for comfort she was advised to follow-up with occupational health for further management of pain to use her home pain medications and return for any worsening symptoms she verbalized understood discharge instructions    Problems Addressed:  Contusion of left knee, initial encounter: complicated acute illness or injury  Contusion of left shoulder, initial encounter: complicated acute illness or injury  Fall, initial encounter: complicated acute illness or injury    Amount and/or Complexity of Data Reviewed  Radiology: ordered and independent interpretation performed. Decision-making details documented in ED Course.        Final diagnoses:   Fall, initial encounter   Contusion of left shoulder, initial encounter   Contusion of left knee, initial encounter       ED Disposition  ED Disposition       ED Disposition   Discharge    Condition   Stable    Comment   --               Chirag Robles,   [address] [facility] [place] " [address]  [place] [address]  [phone]    In 3 days  If symptoms worsen, As needed         Medication List      No changes were made to your prescriptions during this visit.            Yolanda Edward, APRN  05/19/25 [id number]

## 2025-05-19 NOTE — Clinical Note
[facility] EMERGENCY DEPARTMENT  [address] [place] [address]  Phone: [phone]    Gladys Garrison was seen and treated in our emergency department on 5/19/2025.  She may return to work on 05/22/2025.         Thank you for choosing [facility].    Lina Scott RN

## 2025-05-19 NOTE — DISCHARGE INSTRUCTIONS
Continue home medications for pain continue home pain medication      Wear ACE to the left knee and follow up with Workman's Comp as needed.     Return if worse

## 2025-05-28 RX ORDER — LISINOPRIL 20 MG/1
20 TABLET ORAL DAILY
Qty: 90 TABLET | Refills: 3 | Status: SHIPPED | OUTPATIENT
Start: 2025-05-28

## 2025-05-30 ENCOUNTER — TELEPHONE (OUTPATIENT)
Dept: CARDIOLOGY | Facility: CLINIC | Age: 65
End: 2025-05-30
Payer: MEDICARE

## 2025-05-30 RX ORDER — DAPAGLIFLOZIN 5 MG/1
5 TABLET, FILM COATED ORAL DAILY
Qty: 90 TABLET | Refills: 0 | Status: SHIPPED | OUTPATIENT
Start: 2025-05-30

## 2025-05-30 RX ORDER — FUROSEMIDE 20 MG/1
20 TABLET ORAL 2 TIMES DAILY
Qty: 180 TABLET | Refills: 0 | Status: SHIPPED | OUTPATIENT
Start: 2025-05-30 | End: 2025-08-28

## 2025-05-30 NOTE — TELEPHONE ENCOUNTER
Spoke with patient for clarification. Patient needed refills on lasix and farxiga. Sent to pharmacy.

## 2025-05-30 NOTE — TELEPHONE ENCOUNTER
Caller: Gladys Garrison    Relationship to patient: Self    Best call back number: [phone]    Patient is needing: PT IS WONDERING IF DIGOXEN WOULD BE BETTER FOR HER THAN HER METFORMIN. PLEASE CALL PT TO ADVISE.

## 2025-05-30 NOTE — TELEPHONE ENCOUNTER
Left message for patient to return call to schedule Botox appointment.    Patient will need to be scheduled after 4/27/2021 per delivery of specialty Botox.   Patient was confused about which medication it appears that farxiga patient assistance was what she was referring to and she will bring in Monday . We do not have any samples of 5mg at this time for her monday.

## 2025-06-05 NOTE — PROGRESS NOTES
Hematology/Oncology Outpatient Follow Up    PATIENT NAME:Gladys Garrison  :1960  MRN: [id number]  PRIMARY CARE PHYSICIAN: Chirag Robles DO  REFERRING PHYSICIAN: Chirag Robles, *      Chief Complaint   Patient presents with    Follow-up     Malignant neoplasm of female breast, unspecified estrogen receptor status, unspecified laterality, unspecified site of breast            HISTORY OF PRESENT ILLNESS:     This is a 63-year-old female who multiple comorbidities including congenital abnormalities such as hypoplastic kidney, tetralogy of Fallot, coarctation of the aorta and congenital VSD status post repair.  Patient developed syncopal episode and for that reason she had she had a CT scan of the chest which showed mass in the left breast.  She had diagnostic mammogram and ultrasound which showed a 2 cm spiculated mass in the left breast at the 1 o'clock position 6 cm from the nipple.  Biopsy of the left breast mass revealed invasive moderately differentiated carcinoma ER/KS positive and HER-2/bishop negative.  Patient also had an ultrasound of the axilla which was concerning for an abnormal left axillary lymph node with cortical thickening. She apparently had an FNA of the left axillary nodule which was positive for malignancy.  On 2017 patient underwent left modified radical mastectomy, right prophylactic mastectomy and immediate breast reconstruction. She also underwent right prophylactic mastectomy.  We have had her on records suggest that patient did have multifocal disease with pT2 pN1 aM0.  The largest focus measured 3.5 cm.  2 of 11 lymph nodes were positive for metastatic disease some with extracapsular extension.  Notes that patient did receive Arimidex neoadjuvant  from 2017 to 2018 prior to her bilateral mastectomies.    Review of her note suggest that she developed cough which she attributed to anastrozole and patient was then placed on tamoxifen.  She had MammaPrint  testing which returned with low risk for relapse.    Her postop course was also complicated by left chest wall abscess which resulted in I&D and removal of the left tissue expander. She was referred for radiation treatment boards ultimately declined.    Patient was then placed on tamoxifen in April 2018 which she stopped after less than a month due to nausea and vomiting.  Patient in the interim also was diagnosed with chronic hepatitis C was seen by the hepatologist.  Tamoxifen was dose reduced to 10 mg daily with the goal to increase to 20 mg daily.      Patient has relocated to [facility].  She has transitioned her care to us now.  She is currently not on any antiestrogen therapy.     Her bilateral mastectomy specimen are available for review    1/29/2021 patient had bone density which showed osteoporosis  Patient was seen by neurosurgery and had a bone scan done in March 2021 which showed subtle activity in the inferior L4 vertebral body appears to correlate with new area of sclerosis on CT of the abdomen and pelvis.  There is also subtle activity with possible new lesion at the posterior left sacrum.  These findings were concerning for metastatic disease.  PET CT scan was recommended to further evaluate.  4/8/2021 patient had a PET CT scan which showed evidence of disease in the neck chest abdomen and pelvis.  But she has a 1.1 cm mixed lytic/sclerotic hypermetabolic lesion within the left hemisacrum consistent with metastatic disease.  There is also accumulation within the inferior endplate of the L4 vertebral body thought to correspond to 1.2 centimeters sclerotic lesion consistent with metastatic disease.  There is a tiny hypermetabolic focus within the L3, T11.  Suspicious for also early metastatic disease.  4/26/2021 patient underwent CT-guided biopsy of sacral lesion, pathology was consistent with metastatic breast cancer ER and PA positive HER-2/bishop was negative.  7/6/21 CT new sclerosis within the  right 12th rib posteriorly which could represent metastatic lesion or a healed or healing fracture, new sclerosis within the right 12th rib posteriorly which         could represent metastatic lesion,new sclerosis within the right T8 transverse process worrisome   for metastatic        disease progression.  7/7/21 complaints of 8/10 back pain and 8/10 LUQ pain. Referral to radiation for questionable mets on CT chest.  7/26/2021 patient had CT scan of the head with contrast with did not show any evidence of metastatic disease.  CT scan of the chest abdomen and pelvis did not show any evidence of progressive disease.  7/26/2021 patient had CEA level which shows declining values CA 15-3 has decreased to 62 from 84  11/3/2021: Patient had CT scan of the chest, abdomen and pelvis. In the chest there were no suspicious pulmonary nodules. There is no clear evidence of progressive disease  12/13/2021 patient had a bone scan which showed uptake along the posterior right ribs consistent with rib fractures.  There is mild uptake in the L4 vertebral body corresponding to the sclerotic lesion seen on previous CT scan.  This was likely due to metastatic disease  10/6/2022: Patient has been lost to follow-up.  She stopped Ibrance due to pulmonary issues.  Apparently patient went to the emergency room and was told that Ibrance was causing lung damage.  October 6, 2022: She has a increasing CA 15-3 and CA 27.29 as well as CEA level.  10/19/2022: Patient had guardant 360 testing done which was negative  10/31/2022: Patient had bone scan which basically showed evidence for mild progression of multifocal osseous metastatic disease.  There appears to be new activity corresponding to the thoracic cervical spine, left scapula, left sacrum and left proximal femur.  CT scan of the chest otherwise is stable.  There is a nonobstructing stone on the left kidney.        Past Medical History:   Diagnosis Date    Allergic     Bone cancer      METASTATIC BONE; stage 4    Bradycardia     SECONDARY TO ABLATION    Breast cancer     mets to lymph nodes; did not do radiation    Cancer of unknown origin     Compression fx, thoracic spine, open, initial encounter     Coronary artery disease     COVID-19 2021    Diabetes mellitus     Heart defect, congenital 1960    tratology of flow (spelling?)    Heart murmur     Hepatitis C     RESOLVED WITH MEDICATION    Hyperlipidemia     Hypertension     Leaky heart valve     tricuspid & mitral valves leak; not yet enough to warrent another open heart surgery    Obesity (BMI 30-39.9) 2021    Pacemaker     dependent, because of ablations to treat tachycardia    Rib fracture     Per patient    Sepsis 2025    Sleep apnea     no machine    Tachycardia     ATRIAL    Type 2 diabetes mellitus 2017       Past Surgical History:   Procedure Laterality Date    BACK SURGERY      neck X 2    BREAST RECONSTRUCTION Bilateral     CARDIAC ABLATION       atrial tachycardia x 5 ablations     CARDIAC CATHETERIZATION      CARDIAC ELECTROPHYSIOLOGY PROCEDURE N/A 2023    Procedure: Pacemaker RV lead insertion with generator change out. Fresh !tronic;  Surgeon: Steven Hammond MD;  [facility];  Service: Cardiovascular;  Laterality: N/A;    CARDIAC SURGERY      stent placed in aorta    CARDIAC SURGERY      6 surgeries as baby     CERVICAL FUSION ANTERIOR WITH ARTIFICIAL DISCECTOMY IMPLANTATION N/A 2020    Procedure: C4 VERTEBRECTOMY AND ANTERIOR CERIVCAL DISCECTOMY WITH FUSION OF CERVICAL THREE THROUGH FIVE WITH REMOVAL OF HARDWARE C5-C6;  Surgeon: Mark Kaba MD;  [facility];  Service: Neurosurgery;  Laterality: N/A;     SECTION      x2    COLONOSCOPY N/A 10/23/2020    Procedure: COLONOSCOPY WITH POLYPECTOMY X6;  Surgeon: Stanley Bourne MD;  [facility];  Service: Gastroenterology;  Laterality: N/A;  POLYPS, INTERNAL  HEMORRHOIDS    ENDOMETRIAL ABLATION      REMOVAL SCAR TISSUE UTERINE    ENDOSCOPY N/A 10/23/2020    Procedure: ESOPHAGOGASTRODUODENOSCOPY WITH BIOPSY X 1 AREA;  Surgeon: Stanley Bourne MD;  Location: [facility] ENDOSCOPY;  Service: Gastroenterology;  Laterality: N/A;  GASTRITIS, ESOPHAGITIS, HIATAL HERNIA    INSERT / REPLACE / REMOVE PACEMAKER      KNEE SURGERY  2000    MASTECTOMY Bilateral     NECK SURGERY      PACEMAKER IMPLANTATION      Medtronic    PAIN PUMP INSERTION/REVISION N/A 04/05/2022    Procedure: PAIN PUMP INSERTION AND INTRATHECAL CATHETER PLACEMENT;  Surgeon: Chuck Hunter MD;  Location: [facility] MAIN OR;  Service: Pain Management;  Laterality: N/A;         Current Outpatient Medications:     Abemaciclib (Verzenio) 100 MG tablet, Take  by mouth. BID, Disp: , Rfl:     anastrozole (ARIMIDEX) 1 MG tablet, Take 1 tablet by mouth Daily. Pt reports not always remembering to take, Disp: , Rfl:     Blood Glucose Monitoring Suppl (Accu-Chek Araceli) device, Use as instructed   To test blood sugar bid, Disp: 1 each, Rfl: 0    Continuous Blood Gluc  (FreeStyle Rajeev 14 Day [place]) device, 1 each Daily., Disp: 1 each, Rfl: 0    Continuous Blood Gluc Sensor (FreeStyle Rajeev 14 Day Sensor) misc, 1 each Daily., Disp: 6 each, Rfl: 3    dapagliflozin (Farxiga) 5 MG tablet tablet, Take 1 tablet by mouth Daily. (Patient not taking: Reported on 6/6/2025), Disp: 90 tablet, Rfl: 0    fexofenadine (ALLEGRA) 180 MG tablet, Take 1 tablet by mouth Daily. (Patient taking differently: Take 1 tablet by mouth 2 (Two) Times a Day.), Disp: , Rfl:     furosemide (LASIX) 20 MG tablet, Take 1 tablet by mouth 2 (Two) Times a Day for 90 days., Disp: 180 tablet, Rfl: 0    HYDROcodone-acetaminophen (NORCO)  MG per tablet, Take 1 tablet by mouth Daily As Needed for Moderate Pain., Disp: 30 tablet, Rfl: 0    ibuprofen (ADVIL,MOTRIN) 800 MG tablet, Take 1 tablet by mouth Every 8 (Eight) Hours As Needed for Mild Pain.,  "Disp: 30 tablet, Rfl: 0    insulin NPH-insulin regular (humuLIN 70/30,novoLIN 70/30) (70-30) 100 UNIT/ML injection, Inject 5 Units under the skin into the appropriate area as directed Every Evening. If BS over 180, Disp: 15 mL, Rfl: 3    Insulin Pen Needle (B-D UF III MINI PEN NEEDLES) 31G X 5 MM misc, Use to inject insulin twice daily   DX: E11.9, Disp: 100 each, Rfl: 0    Insulin Syringe-Needle U-100 28G X 1/2\" 1 ML misc, Use 1 each 2 (Two) Times a Day., Disp: 100 each, Rfl: 6    Levomefolate Glucosamine (Methyl-Folate) 1700 MCG capsule, Take 1 tablet by mouth Daily., Disp: 30 capsule, Rfl: 0    lisinopril (PRINIVIL,ZESTRIL) 20 MG tablet, Take 1 tablet by mouth Daily. (Patient taking differently: Take 1 tablet by mouth Daily. Taking as needed), Disp: 90 tablet, Rfl: 3    morphine 5 mg/mL, by Intrathecal route Continuous., Disp: 20 mL, Rfl: 0    Allergies   Allergen Reactions    Promethazine Other (See Comments)     Hyperactive mean    Tape Other (See Comments)     .blisters      Adhesive Tape Rash    Flexeril [Cyclobenzaprine] Rash       Family History   Problem Relation Age of Onset    Heart disease Mother     Stroke Mother     Lung cancer Mother     Aneurysm Father     Diabetes Sister     No Known Problems Brother     No Known Problems Brother     Diabetes type I Half-Sister     Thyroid cancer Half-Sister     Cancer Maternal Aunt     Heart attack Sister     Thyroid disease Sister     No Known Problems Sister        Cancer-related family history includes Cancer in her maternal aunt; Lung cancer in her mother; Thyroid cancer in her half-sister.    Social History     Tobacco Use    Smoking status: Never     Passive exposure: Never    Smokeless tobacco: Never   Vaping Use    Vaping status: Never Used   Substance Use Topics    Alcohol use: Yes     Comment: drinks rarely    Drug use: Never       I have reviewed and confirmed the accuracy of the patient's history: Chief complaint, HPI, ROS, and Subjective as entered by " "the MA/ROBIN/RN. Мария Nunez MD 06/06/25 6/6/25    SUBJECTIVE:      Patient denies any new issues.  Holding abnormality seen and Aromasin did not really change her symptoms.  She now has more pain medications    Patient is here today posthospital admission.  She had a repeat GREGORY and a follow-up appointment coming up with her cardiologist.  She has not started her anticancer treatments.  She just completed IV antibiotics approximately 6 days ago.      She is here for fu 6/6/25              Gladys NOBLES Bladimir reports a pain score of 10.  Given her pain assessment as noted, treatment options were discussed and the following options were decided upon as a follow-up plan to address the patient's pain: continuation of current treatment plan for pain and referral to specialist for assistance in pain treatment guidance.       REVIEW OF SYSTEMS:     Review of Systems   Constitutional:  Positive for fatigue. Negative for chills and fever.   HENT:  Negative for ear pain, mouth sores, nosebleeds and sore throat.    Eyes:  Negative for photophobia and visual disturbance.   Respiratory:  Negative for wheezing and stridor.    Cardiovascular:  Negative for chest pain and palpitations.   Gastrointestinal:  Negative for abdominal pain, diarrhea, nausea and vomiting.   Endocrine: Negative for cold intolerance and heat intolerance.   Genitourinary:  Negative for dysuria and hematuria.   Musculoskeletal:  Negative for joint swelling and neck stiffness.   Skin:  Negative for color change and rash.   Neurological:  Negative for seizures and syncope.   Hematological:  Negative for adenopathy.        No obvious bleeding   Psychiatric/Behavioral:  Negative for agitation, confusion and hallucinations.          OBJECTIVE:    Vitals:    06/06/25 1111   BP: 141/75   Pulse: 84   Resp: 18   Temp: 99 °F (37.2 °C)   TempSrc: Temporal   SpO2: 99%   Weight: 53.5 kg (118 lb)   Height: 152.4 cm (60\")   PainSc: 10-Worst pain ever   PainLoc: " Generalized         Body mass index is 23.05 kg/m².    ECOG    (1) Restricted in physically strenuous activity, ambulatory and able to do work of light nature    Physical Exam  Vitals and nursing note reviewed.   Constitutional:       General: She is not in acute distress.     Appearance: Normal appearance. She is not diaphoretic.   HENT:      Head: Normocephalic and atraumatic.      Mouth/Throat:      Mouth: Mucous membranes are moist.      Pharynx: Oropharynx is clear.   Eyes:      General: No scleral icterus.        Right eye: No discharge.         Left eye: No discharge.      Extraocular Movements: Extraocular movements intact.      Conjunctiva/sclera: Conjunctivae normal.   Neck:      Thyroid: No thyromegaly.   Cardiovascular:      Rate and Rhythm: Normal rate and regular rhythm.      Pulses: Normal pulses.      Heart sounds: Normal heart sounds.      No friction rub. No gallop.   Pulmonary:      Effort: Pulmonary effort is normal. No respiratory distress.      Breath sounds: Normal breath sounds. No stridor. No wheezing.   Abdominal:      General: Bowel sounds are normal. There is distension.      Palpations: Abdomen is soft. There is no mass.      Tenderness: There is abdominal tenderness. There is guarding (Left upper abdomen). There is no rebound.   Musculoskeletal:         General: No tenderness. Normal range of motion.      Cervical back: Normal range of motion and neck supple.      Right lower leg: No edema.      Left lower leg: No edema.      Comments: Neck pain.  Patient has a neck collar.   Lymphadenopathy:      Cervical: No cervical adenopathy.   Skin:     General: Skin is warm and dry.      Capillary Refill: Capillary refill takes less than 2 seconds.      Findings: No bruising, erythema or rash.   Neurological:      Mental Status: She is alert and oriented to person, place, and time.      Cranial Nerves: No cranial nerve deficit.      Sensory: No sensory deficit.      Motor: No abnormal muscle  tone.   Psychiatric:         Mood and Affect: Mood normal.         Behavior: Behavior normal.         Thought Content: Thought content normal.         Judgment: Judgment normal.       I have reexamined the patient and the results are consistent with the previously documented exam. Мария Nunez MD  6/6/25    RECENT LABS    WBC   Date Value Ref Range Status   06/06/2025 1.42 (C) 3.40 - 10.80 10*3/mm3 Final   02/17/2025 2.96 (L) 3.70 - 10.30 10*3/uL Final     RBC   Date Value Ref Range Status   06/06/2025 3.04 (L) 3.77 - 5.28 10*6/mm3 Final   02/17/2025 3 (L) 3.90 - 5.20 10*6/uL Final     Hemoglobin   Date Value Ref Range Status   06/06/2025 9.0 (L) 12.0 - 15.9 g/dL Final   02/17/2025 8.5 (L) 11.2 - 15.7 g/dL Final     Hematocrit   Date Value Ref Range Status   06/06/2025 29.5 (L) 34.0 - 46.6 % Final   02/17/2025 27.3 (L) 34.0 - 45.0 % Final     MCV   Date Value Ref Range Status   06/06/2025 97.0 79.0 - 97.0 fL Final   02/17/2025 91 79 - 98 fL Final     MCH   Date Value Ref Range Status   06/06/2025 29.6 26.6 - 33.0 pg Final   02/17/2025 28.3 26.0 - 32.0 pg Final     MCHC   Date Value Ref Range Status   06/06/2025 30.5 (L) 31.5 - 35.7 g/dL Final   02/17/2025 31.1 30.7 - 35.5 g/dL Final     RDW   Date Value Ref Range Status   06/06/2025 15.2 12.3 - 15.4 % Final   02/17/2025 13.6 11.5 - 14.5 % Final     RDW-SD   Date Value Ref Range Status   06/06/2025 51.3 37.0 - 54.0 fl Final     MPV   Date Value Ref Range Status   06/06/2025 10.4 6.0 - 12.0 fL Final   02/17/2025 10.1 8.8 - 12.5 fL Final     Platelets   Date Value Ref Range Status   06/06/2025 56 (L) 140 - 450 10*3/mm3 Final   02/17/2025 89 (L) 155 - 369 10*3/uL Final     Neutrophil Rel %   Date Value Ref Range Status   02/10/2025 82 % Final     Neutrophil %   Date Value Ref Range Status   06/06/2025 71.1 42.7 - 76.0 % Final     Lymphocyte Rel %   Date Value Ref Range Status   02/10/2025 9 % Final     Lymphocyte %   Date Value Ref Range Status   06/06/2025  18.3 (L) 19.6 - 45.3 % Final     Monocyte Rel %   Date Value Ref Range Status   02/10/2025 7 % Final     Monocyte %   Date Value Ref Range Status   06/06/2025 9.2 5.0 - 12.0 % Final     Eosinophil %   Date Value Ref Range Status   06/06/2025 1.4 0.3 - 6.2 % Final   02/10/2025 1 % Final     Basophil Rel %   Date Value Ref Range Status   02/10/2025 0 % Final     Basophil %   Date Value Ref Range Status   06/06/2025 0.0 0.0 - 1.5 % Final     Immature Grans %   Date Value Ref Range Status   05/13/2025 0.5 0.0 - 0.5 % Final   02/10/2025 1 % Final     Neutrophils Absolute   Date Value Ref Range Status   02/10/2025 3.77 1.60 - 6.10 10*3/uL Final     Neutrophils, Absolute   Date Value Ref Range Status   06/06/2025 1.01 (L) 1.70 - 7.00 10*3/mm3 Final     Lymphocytes Absolute   Date Value Ref Range Status   02/10/2025 0.39 (L) 1.20 - 3.90 10*3/uL Final     Lymphocytes, Absolute   Date Value Ref Range Status   06/06/2025 0.26 (L) 0.70 - 3.10 10*3/mm3 Final     Monocytes Absolute   Date Value Ref Range Status   02/10/2025 0.32 0.30 - 0.90 10*3/uL Final     Monocytes, Absolute   Date Value Ref Range Status   06/06/2025 0.13 0.10 - 0.90 10*3/mm3 Final     Eosinophils Absolute   Date Value Ref Range Status   02/10/2025 0.03 0.00 - 0.50 10*3/uL Final     Eosinophils, Absolute   Date Value Ref Range Status   06/06/2025 0.02 0.00 - 0.40 10*3/mm3 Final     Basophils Absolute   Date Value Ref Range Status   02/10/2025 0.01 0.00 - 0.10 10*3/uL Final     Basophils, Absolute   Date Value Ref Range Status   06/06/2025 0.00 0.00 - 0.20 10*3/mm3 Final     Immature Grans, Absolute   Date Value Ref Range Status   05/13/2025 0.01 0.00 - 0.05 10*3/mm3 Final   02/10/2025 0.03 0.00 - 0.06 10*3/uL Final     nRBC   Date Value Ref Range Status   05/13/2025 0.0 0.0 - 0.2 /100 WBC Final       Lab Results   Component Value Date    GLUCOSE 151 (H) 05/13/2025    BUN 15 05/13/2025    CREATININE 0.94 05/13/2025    EGFRIFNONA 70 12/20/2021    EGFRIFAFRI >60  10/12/2018    BCR 16.0 05/13/2025    K 4.3 05/13/2025    CO2 23.0 05/13/2025    CALCIUM 9.0 05/13/2025    ALBUMIN 4.2 05/13/2025    AST 30 05/13/2025    ALT 13 05/13/2025       ASSESSMENT:    Metastatic breast cancer presenting as 1.1 cm mixed lytic/sclerotic hypermetabolic lesion in the left hemisacrum suspicious for metastatic disease 1.2 cm sclerotic lesion on L4, small L3 lesion and T11 lesion.  Tumor is ER positive, HI positive and HER-2/bishop negative.  Patient was prescribed combination of Ibrance and Arimidex.  Continued Ibrance due to pulmonary toxicity.  She was then placed on single agent Arimidex.  Upon progression on Arimidex was switched to Faslodex.  She developed L1 vertebral body progressive disease on Faslodex for that reason we will discontinue Faslodex and begin combination of Aromasin and Afinitor.  Patient took Afinitor for 3 days and discontinued due to nausea and fatigue.  [id number] does not show any actionable mutation.  She does not have any soft tissue for us to biopsy for additional NGS testing.  Ongoing management.  Her CT scans does not show any evidence of progressive disease  She is currently on Aromasin and abemaciclib stable.  Holding her meds did not change her symptoms.  Therefore we will go ahead and restart at this time.  Her CT scans completed August 2024 shows overall stable disease with no evidence of progression.  I have encouraged her to have the plain films of the right knee that was recommended by radiologist.  Reviewed  Abemaciclib induced diarrhea: Intermittent Lomotil, continue the same  Pancytopenia secondary to abemaciclib: Patient has now agreed to proceed with dose reduction  Fatigue secondary to abemaciclib: This is stable  Thrombocytopenia due to abemaciclib.  CBC reviewed.  Platelets up to 80,000  Bone metastasis:.  Biphosphonate's has been recommended patient has not been able to have due to ongoing dental issues  Right foot swelling: Doppler study was  negative  History of medical noncompliance  L1 vertebral body involvement.  Patient is established with neurosurgical team  Pain management: She will follow-up with pain management team  ypT2 N1 aM0 status post left modified radical mastectomy with lymph node dissection and right prophylactic mastectomy in 2017 performed at [facility].  ER positive, NV positive and HER-2/bishop negative.  Status post bilateral chest wall reconstruction.  According to patient, she tolerated Arimidex very well except that she also had osteoporosis therefore Arimidex was discontinued at that time  Intolerance of tamoxifen in the past  Osteoporosis: We will transition to Xgeva since she has bone metastases  Status post neoadjuvant Arimidex prior to bilateral mastectomies  Thrombocytopenia: Work-up was - December 2020  Neck pain status post cervical spine surgery: Resolved  Complex cardiac medical history including tetralogy of Fallot, coarctation of aorta, VSD status post repair.  Status post stent placement for coarctation of aorta  Personal history and strong family history of breast cancer in multiple in the relatives on paternal side of the family including 4 paternal aunts in their 30s and 40s and 2 maternal aunts at age of 20s and 50s.  There is concern for possible hereditary breast cancer syndrome.  Patient may be  interested in pursuing cancer genetics for her management.  Assessment has been reviewed and updated          Discussion    Patient has metastatic breast cancer estrogen and progesterone dependent and HER-2/bishop negative.  She is currently on Arimidex.  We will add Ibrance.  We will also discontinue Prolia and begin Xgeva to help reduce skeletal events.           Plans:     continue Abemaciclib, fu in 4 weeks.   Labs reviewed  Follow-up with cardiology  Continue surveillance CT scans  Continue supportive care antinausea medications  Reviewed imaging results with patient  Pancytopenia is due to  Verzenio: Platelets up to 90,000  [facility] pharmacist follow-up with her next week  Checked tumor markers for CEA CA 15-3 and CA 27.29 reviewed  She has completed  palliative XRT to her lumbar spine  Follow-up with Dr. Ruff with neurosurgical services  Guardant 360 for NGS testing was negative for any actionable mutations  Follow-up with pain management with Dr. Hunter  Guardian 360 does not show any actionable mutation.  Would consider soft tissue biopsy at time of progression for additional NGS testing.  Reviewed with patient  Referred to pulmonary for her dyspnea: Dr. Julio.  Appointment is pending  Follow-up with pain management for ongoing pain issues  Follow-up with /counselor   Reviewed work-up for thrombocytopenia which was negative  Reviewed her bone density which showed osteoporosis  Schedule Xgeva 120 mg subcu monthly once her dental procedures are completed.  She is still waiting on a ida  All questions answered  Follow up in 4 weeks  Labs reviewed            Patient verbalized understanding and is in agreement of the above plan.           Electronically signed by Мария Nunez MD, 06/06/25, 3:46 PM EDT.

## 2025-06-06 ENCOUNTER — OFFICE VISIT (OUTPATIENT)
Dept: ONCOLOGY | Facility: CLINIC | Age: 65
End: 2025-06-06
Payer: MEDICARE

## 2025-06-06 ENCOUNTER — LAB (OUTPATIENT)
Dept: LAB | Facility: HOSPITAL | Age: 65
End: 2025-06-06
Payer: MEDICARE

## 2025-06-06 ENCOUNTER — SPECIALTY PHARMACY (OUTPATIENT)
Dept: PHARMACY | Facility: HOSPITAL | Age: 65
End: 2025-06-06
Payer: MEDICARE

## 2025-06-06 VITALS
HEIGHT: 60 IN | DIASTOLIC BLOOD PRESSURE: 75 MMHG | BODY MASS INDEX: 23.16 KG/M2 | TEMPERATURE: 99 F | RESPIRATION RATE: 18 BRPM | HEART RATE: 84 BPM | WEIGHT: 118 LBS | SYSTOLIC BLOOD PRESSURE: 141 MMHG | OXYGEN SATURATION: 99 %

## 2025-06-06 DIAGNOSIS — C50.919 MALIGNANT NEOPLASM OF FEMALE BREAST, UNSPECIFIED ESTROGEN RECEPTOR STATUS, UNSPECIFIED LATERALITY, UNSPECIFIED SITE OF BREAST: Primary | ICD-10-CM

## 2025-06-06 DIAGNOSIS — R94.8 ABNORMAL RADIONUCLIDE BONE SCAN: ICD-10-CM

## 2025-06-06 LAB
BASOPHILS # BLD AUTO: 0 10*3/MM3 (ref 0–0.2)
BASOPHILS NFR BLD AUTO: 0 % (ref 0–1.5)
DEPRECATED RDW RBC AUTO: 51.3 FL (ref 37–54)
EOSINOPHIL # BLD AUTO: 0.02 10*3/MM3 (ref 0–0.4)
EOSINOPHIL NFR BLD AUTO: 1.4 % (ref 0.3–6.2)
ERYTHROCYTE [DISTWIDTH] IN BLOOD BY AUTOMATED COUNT: 15.2 % (ref 12.3–15.4)
HCT VFR BLD AUTO: 29.5 % (ref 34–46.6)
HGB BLD-MCNC: 9 G/DL (ref 12–15.9)
HOLD SPECIMEN: NORMAL
HOLD SPECIMEN: NORMAL
LYMPHOCYTES # BLD AUTO: 0.26 10*3/MM3 (ref 0.7–3.1)
LYMPHOCYTES NFR BLD AUTO: 18.3 % (ref 19.6–45.3)
MCH RBC QN AUTO: 29.6 PG (ref 26.6–33)
MCHC RBC AUTO-ENTMCNC: 30.5 G/DL (ref 31.5–35.7)
MCV RBC AUTO: 97 FL (ref 79–97)
MONOCYTES # BLD AUTO: 0.13 10*3/MM3 (ref 0.1–0.9)
MONOCYTES NFR BLD AUTO: 9.2 % (ref 5–12)
NEUTROPHILS NFR BLD AUTO: 1.01 10*3/MM3 (ref 1.7–7)
NEUTROPHILS NFR BLD AUTO: 71.1 % (ref 42.7–76)
PLATELET # BLD AUTO: 56 10*3/MM3 (ref 140–450)
PMV BLD AUTO: 10.4 FL (ref 6–12)
RBC # BLD AUTO: 3.04 10*6/MM3 (ref 3.77–5.28)
WBC NRBC COR # BLD AUTO: 1.42 10*3/MM3 (ref 3.4–10.8)

## 2025-06-06 PROCEDURE — 36415 COLL VENOUS BLD VENIPUNCTURE: CPT

## 2025-06-06 PROCEDURE — 85025 COMPLETE CBC W/AUTO DIFF WBC: CPT

## 2025-06-06 RX ORDER — ANASTROZOLE 1 MG/1
1 TABLET ORAL DAILY
COMMUNITY

## 2025-06-06 NOTE — PROGRESS NOTES
Specialty Pharmacy Patient Management Program  Oncology Reassessment     Gladys Garrison was referred by an their provider to the Oncology Patient Management program offered by [facility] for metastatic breast cancer, ER +/HER2-. A follow-up outreach was conducted, including assessment of continued therapy appropriateness, medication adherence, and side effect incidence and management for Verzenio (abemaciclib).  Verzenio 100 mg BID    Changes to Insurance Coverage or Financial Support  NO changes, pt continues to receive from      Relevant Past Medical History and Comorbidities  Relevant medical history and concomitant health conditions were discussed with the patient. The patient's chart has been reviewed for relevant past medical history and comorbid health conditions and updated as necessary.   Past Medical History:   Diagnosis Date    Allergic     Bone cancer     METASTATIC BONE; stage 4    Bradycardia     SECONDARY TO ABLATION    Breast cancer 2017    mets to lymph nodes; did not do radiation    Cancer of unknown origin     Compression fx, thoracic spine, open, initial encounter     Coronary artery disease     COVID-19 09/01/2021    Diabetes mellitus     Heart defect, congenital 1960    tratology of flow (spelling?)    Heart murmur     Hepatitis C     RESOLVED WITH MEDICATION    Hyperlipidemia     Hypertension     Leaky heart valve     tricuspid & mitral valves leak; not yet enough to warrent another open heart surgery    Obesity (BMI 30-39.9) 02/05/2021    Pacemaker     dependent, because of ablations to treat tachycardia    Rib fracture     Per patient    Sepsis 01/2025    Sleep apnea     no machine    Tachycardia     ATRIAL    Type 2 diabetes mellitus 11/2017     Social History     Socioeconomic History    Marital status:     Number of children: 2   Tobacco Use    Smoking status: Never     Passive exposure: Never    Smokeless tobacco: Never   Vaping Use     Vaping status: Never Used   Substance and Sexual Activity    Alcohol use: Yes     Comment: drinks rarely    Drug use: Never    Sexual activity: Defer     Problem list reviewed by Verona Cowart RPH on 6/6/2025 at 11:36 AM    Hospitalizations and Urgent Care Since Last Assessment  ED Visits, Admissions, or Hospitalizations: Yes, 3/2025 for SOB; 5/1/25 ED visit for abdominal pain and 5/19/25 ED visit for a fall  Urgent Office Visit: None reported     Allergies  Known allergies and reactions were discussed with the patient. The patient's chart has been reviewed for allergy information and updated as necessary.   Allergies   Allergen Reactions    Promethazine Other (See Comments)     Hyperactive mean    Tape Other (See Comments)     .blisters      Adhesive Tape Rash    Flexeril [Cyclobenzaprine] Rash     Allergies reviewed by Verona Cowart RPH on 6/6/2025 at 11:36 AM    Relevant Laboratory Values  Relevant laboratory values were discussed with the patient. The following specialty medication dose adjustment(s) are recommended: No dose adjustments are needed for the oral specialty medication(s) based on the labs.    Lab Results   Component Value Date    GLUCOSE 151 (H) 05/13/2025    CALCIUM 9.0 05/13/2025     05/13/2025    K 4.3 05/13/2025    CO2 23.0 05/13/2025     05/13/2025    BUN 15 05/13/2025    CREATININE 0.94 05/13/2025    EGFRIFAFRI >60 10/12/2018    EGFRIFNONA 70 12/20/2021    BCR 16.0 05/13/2025    ANIONGAP 12.0 05/13/2025     Lab Results   Component Value Date    WBC 1.42 (C) 06/06/2025    RBC 3.04 (L) 06/06/2025    HGB 9.0 (L) 06/06/2025    HCT 29.5 (L) 06/06/2025    MCV 97.0 06/06/2025    MCH 29.6 06/06/2025    MCHC 30.5 (L) 06/06/2025    RDW 15.2 06/06/2025    RDWSD 51.3 06/06/2025    MPV 10.4 06/06/2025    PLT 56 (L) 06/06/2025    NEUTRORELPCT 71.1 06/06/2025    LYMPHORELPCT 18.3 (L) 06/06/2025    MONORELPCT 9.2 06/06/2025    EOSRELPCT 1.4 06/06/2025    BASORELPCT 0.0 06/06/2025    AUTOIGPER 0.5  05/13/2025    NEUTROABS 1.01 (L) 06/06/2025    LYMPHSABS 0.26 (L) 06/06/2025    MONOSABS 0.13 06/06/2025    EOSABS 0.02 06/06/2025    BASOSABS 0.00 06/06/2025    AUTOIGNUM 0.01 05/13/2025    NRBC 0.0 05/13/2025       Current Medication List  This medication list has been reviewed with the patient and evaluated for any interactions or necessary modifications/recommendations, and updated to include all prescription medications, OTC medications, and supplements the patient is currently taking.  This list reflects what is contained in the patient's profile, which has also been marked as reviewed to communicate to other providers it is the most up to date version of the patient's current medication therapy.     Current Outpatient Medications:     Abemaciclib (Verzenio) 100 MG tablet, Take  by mouth. BID, Disp: , Rfl:     fexofenadine (ALLEGRA) 180 MG tablet, Take 1 tablet by mouth Daily. (Patient taking differently: Take 1 tablet by mouth 2 (Two) Times a Day.), Disp: , Rfl:     furosemide (LASIX) 20 MG tablet, Take 1 tablet by mouth 2 (Two) Times a Day for 90 days., Disp: 180 tablet, Rfl: 0    HYDROcodone-acetaminophen (NORCO)  MG per tablet, Take 1 tablet by mouth Daily As Needed for Moderate Pain., Disp: 30 tablet, Rfl: 0    ibuprofen (ADVIL,MOTRIN) 800 MG tablet, Take 1 tablet by mouth Every 8 (Eight) Hours As Needed for Mild Pain., Disp: 30 tablet, Rfl: 0    insulin NPH-insulin regular (humuLIN 70/30,novoLIN 70/30) (70-30) 100 UNIT/ML injection, Inject 5 Units under the skin into the appropriate area as directed Every Evening. If BS over 180, Disp: 15 mL, Rfl: 3    lisinopril (PRINIVIL,ZESTRIL) 20 MG tablet, Take 1 tablet by mouth Daily. (Patient taking differently: Take 1 tablet by mouth Daily. Taking as needed), Disp: 90 tablet, Rfl: 3    anastrozole (ARIMIDEX) 1 MG tablet, Take 1 tablet by mouth Daily. Pt reports not always remembering to take, Disp: , Rfl:     Blood Glucose Monitoring Suppl (Accu-Chek  "Araceli) device, Use as instructed   To test blood sugar bid, Disp: 1 each, Rfl: 0    Continuous Blood Gluc  (FreeStyle Rajeev 14 Day [place]) device, 1 each Daily., Disp: 1 each, Rfl: 0    Continuous Blood Gluc Sensor (FreeStyle Rajeev 14 Day Sensor) misc, 1 each Daily., Disp: 6 each, Rfl: 3    dapagliflozin (Farxiga) 5 MG tablet tablet, Take 1 tablet by mouth Daily. (Patient not taking: Reported on 6/6/2025), Disp: 90 tablet, Rfl: 0    Insulin Pen Needle (B-D UF III MINI PEN NEEDLES) 31G X 5 MM misc, Use to inject insulin twice daily   DX: E11.9, Disp: 100 each, Rfl: 0    Insulin Syringe-Needle U-100 28G X 1/2\" 1 ML misc, Use 1 each 2 (Two) Times a Day., Disp: 100 each, Rfl: 6    Levomefolate Glucosamine (Methyl-Folate) 1700 MCG capsule, Take 1 tablet by mouth Daily., Disp: 30 capsule, Rfl: 0    morphine 5 mg/mL, by Intrathecal route Continuous., Disp: 20 mL, Rfl: 0    Medicines reviewed by Verona Cowart RPH on 6/6/2025 at 11:20 AM  Medicines reviewed by Verona Cowart RPH on 6/6/2025 at 11:21 AM    Drug Interactions  Assessed medication list for interactions, no significant drug interactions noted.   Advised patient to call the clinic if any new medications are started so we can assess for drug-drug interactions.  Drug-food interactions discussed: eating grapefruit and drinking grapefruit juice    Adverse Drug Reactions  Medication tolerability: Tolerating with no to minimal ADRs  Medication plan: Continue therapy with normal follow-up  Plan for ADR Management: Pt reports no SE with reduced dose. She did go to ED for abdominal pain, but unsure if related to medication as she did not note this as a continuous issue.    Adherence, Self-Administration, and Current Therapy Problems  Adherence related to the patient's specialty therapy was discussed with the patient. The Adherence segment of this outreach has been reviewed and updated.     Adherence Questions  Linked Medication(s) Assessed: Abemaciclib " (Verzenio)  On average, how many doses/injections does the patient miss per month?: 5  What are the identified reasons for non-adherence or missed doses? : patient forgets  List other reason(s) for non-adherence or missed doses: Pt reports she always takes her morning dose, but forgets to take the evening dose at times. Discussed using an alarm on phone. She reports she will remember when her partner takes his evening medications. Discussed utilizing him to help her remember to take her evening dose.  What is the estimated medication adherence level?: 80-89%  Based on the patient/caregiver response and refill history, does this patient require an MTP to track adherence improvements?: no (We have discussed adherence several times and on multiple occassions. In some aspect she is doing better, but she still struggles with evening dose.)    Additional Barriers to Patient Self-Administration: Pt forgets evening dose  Methods for Supporting Patient Self-Administration: Alarm or family member to help remember  Patient has had no issues obtaining medication from pharmacy.    Open Medication Therapy Problems  No medication therapy recommendations to display    Goals of Therapy  Goals related to the patient's specialty therapy were discussed with the patient. The Patient Goals segment of this outreach has been reviewed and updated.   Goals Addressed Today        Specialty Pharmacy General Goal      Clinical goal/therapeutic target: disease control, per the recent oncology clinic notes and labs.   Sept 2024: Per provider dictation, no signs of progression, pt to continue current treatment   4/2025- CT scans stable disease, no progression.              Quality of Life Assessment   Quality of Life related to the patient's enrollment in the patient management program and services provided was discussed with the patient. The QOL segment of this outreach has been reviewed and updated.  Quality of Life Improvement Scale: 7-Somewhat  better    Discussed aforementioned material with patient in person, face-to-face, in clinic.     Reassessment Plan & Follow-Up  1. Medication Therapy Changes: Added anastrozole? Last AI prescribed was exemestane, but pt reports she takes anastrozole. Discussed with Dr. Nunez and per provider, pt to bring in medications to determine exactly which AI she is taking and she is okay with sending in a refill at that time.  2. Related Plans, Therapy Recommendations, or Issues to Be Addressed: None at this time, but will need constant reminders on importance of adherence. Adherence has been on ongoing concern.  3. Pharmacist to perform regular assessments no more than (6) months from the previous assessment.   4. Care Coordinator to set up future refill outreaches, coordinate prescription delivery, and escalate clinical questions to pharmacist.    Attestation  Therapeutic appropriateness: Appropriate   I attest the patient was actively involved in and has agreed to the above plan of care.  If the prescribed therapy is at any point deemed not appropriate based on the current or future assessments, a consultation will be initiated with the patient's specialty care provider to determine the best course of action. The revised plan of therapy will be documented along with any required assessments and/or additional patient education provided.     Verona Cowart, Pharm.D. [PHARMACIST NAME, CREDENTIALS]  Clinical Specialty Pharmacist, Oncology  6/6/2025  14:27 EDT

## 2025-06-09 ENCOUNTER — TELEPHONE (OUTPATIENT)
Dept: PAIN MEDICINE | Facility: CLINIC | Age: 65
End: 2025-06-09

## 2025-06-09 NOTE — TELEPHONE ENCOUNTER
Caller: ISAI WITH [facility]    Relationship: PROVIDER    Best call back number: [phone]    What orders are you requesting (i.e. lab or imaging): PATIENT REQUESTING INCREASE IN DAILY DOSE PAIN PUMP- NEEDS VERBAL ORDER- PLEASE CALL

## 2025-06-11 ENCOUNTER — TELEPHONE (OUTPATIENT)
Dept: PAIN MEDICINE | Facility: CLINIC | Age: 65
End: 2025-06-11

## 2025-06-11 NOTE — TELEPHONE ENCOUNTER
Hub staff attempted to follow warm transfer process and was unsuccessful     Caller: ISAI GONZALEZ    Relationship to patient:[facility]    Best call back number:     Patient is needing:  CARIN WOULD LIKE A CALL BACK REGARDING THE PATIENT'S PAIN PUMP

## 2025-06-11 NOTE — TELEPHONE ENCOUNTER
Patient needs a refill of her pain pump med for her home refill.     I also spoke with Tiffanie her nurse and she stated that she believes that the patient would benefit from a PTM since she is still wanting to work and taking her cancer in consideration. I informed her that that the patient has appointment with on Monday and it can be discussed at her appointment. Then we will call her with any suggestions. She does not have a ptm device so her nurse tiffanie said she would contact [employer] and try to get one if we add PTMs for her next refill.

## 2025-06-16 NOTE — TELEPHONE ENCOUNTER
Refill  Last OV:4/22/25  Next OV: none scheduled      Home health nurseTiffanie called for refill - she is due Friday 6/20/25

## 2025-07-05 PROCEDURE — 87086 URINE CULTURE/COLONY COUNT: CPT | Performed by: NURSE PRACTITIONER

## 2025-07-07 ENCOUNTER — OFFICE VISIT (OUTPATIENT)
Dept: FAMILY MEDICINE CLINIC | Facility: CLINIC | Age: 65
End: 2025-07-07
Payer: MEDICARE

## 2025-07-07 VITALS
HEART RATE: 61 BPM | WEIGHT: 114 LBS | BODY MASS INDEX: 22.38 KG/M2 | SYSTOLIC BLOOD PRESSURE: 142 MMHG | HEIGHT: 60 IN | OXYGEN SATURATION: 96 % | DIASTOLIC BLOOD PRESSURE: 70 MMHG

## 2025-07-07 DIAGNOSIS — R35.0 URINARY FREQUENCY: Primary | ICD-10-CM

## 2025-07-07 DIAGNOSIS — R22.2 SUBCUTANEOUS MASS OF ABDOMINAL WALL: ICD-10-CM

## 2025-07-07 LAB
BILIRUB BLD-MCNC: NEGATIVE MG/DL
CLARITY, POC: CLEAR
COLOR UR: YELLOW
GLUCOSE UR STRIP-MCNC: NEGATIVE MG/DL
KETONES UR QL: NEGATIVE
LEUKOCYTE EST, POC: NEGATIVE
NITRITE UR-MCNC: NEGATIVE MG/ML
PH UR: 6.5 [PH] (ref 5–8)
PROT UR STRIP-MCNC: NEGATIVE MG/DL
RBC # UR STRIP: NEGATIVE /UL
SP GR UR: 1.02 (ref 1–1.03)
UROBILINOGEN UR QL: NORMAL

## 2025-07-07 PROCEDURE — 1125F AMNT PAIN NOTED PAIN PRSNT: CPT | Performed by: STUDENT IN AN ORGANIZED HEALTH CARE EDUCATION/TRAINING PROGRAM

## 2025-07-07 PROCEDURE — 1160F RVW MEDS BY RX/DR IN RCRD: CPT | Performed by: STUDENT IN AN ORGANIZED HEALTH CARE EDUCATION/TRAINING PROGRAM

## 2025-07-07 PROCEDURE — 3078F DIAST BP <80 MM HG: CPT | Performed by: STUDENT IN AN ORGANIZED HEALTH CARE EDUCATION/TRAINING PROGRAM

## 2025-07-07 PROCEDURE — 81003 URINALYSIS AUTO W/O SCOPE: CPT | Performed by: STUDENT IN AN ORGANIZED HEALTH CARE EDUCATION/TRAINING PROGRAM

## 2025-07-07 PROCEDURE — 3077F SYST BP >= 140 MM HG: CPT | Performed by: STUDENT IN AN ORGANIZED HEALTH CARE EDUCATION/TRAINING PROGRAM

## 2025-07-07 PROCEDURE — 1159F MED LIST DOCD IN RCRD: CPT | Performed by: STUDENT IN AN ORGANIZED HEALTH CARE EDUCATION/TRAINING PROGRAM

## 2025-07-07 PROCEDURE — 99214 OFFICE O/P EST MOD 30 MIN: CPT | Performed by: STUDENT IN AN ORGANIZED HEALTH CARE EDUCATION/TRAINING PROGRAM

## 2025-07-07 NOTE — PROGRESS NOTES
"Chief Complaint  Chief Complaint   Patient presents with    Urinary Frequency     And painful/pressure - went to Urgent care. taking bactrim, symptoms remain. Was hospitalized with sepsis last year    Fatigue       Subjective        Gladys Garrison is a 64 y.o. female who presents to [facility].  History of Present Illness    Gladys is a 64-year-old female here for urinary frequency.    Patient reports urinary frequency and suprapubic pressure for the last 3-4 days.  She was seen at urgent care on 7/5 for the symptoms.  She states that she has been taking Bactrim as prescribed but has not had any improvement in symptoms.    In addition to the above symptoms she also mentions fever the evening of 7/5 that has resolved since then.      Patient also reports superficial abdominal mass for the last month.  Has not changed in size since she first noticed it.        Objective   /70   Pulse 61   Ht 152.4 cm (60\")   Wt 51.7 kg (114 lb)   SpO2 96%   BMI 22.26 kg/m²     Estimated body mass index is 22.26 kg/m² as calculated from the following:    Height as of this encounter: 152.4 cm (60\").    Weight as of this encounter: 51.7 kg (114 lb).     Physical Exam   GEN: In no acute distress, non toxic appearing  HEENT: MMM. EOMI.   CV: No extremity edema.   RESP: No signs of respiratory distress.  SKIN: No rashes.  There is a firm, palpable, subcutaneous mass just above the umbilicus approximately 1.5 cm in diameter that is moderately tender to palpation.  Mass does not change in size with Valsalva maneuver.  MSK: No deformity.  No CVA tenderness bilaterally.  NEURO: Moves all extremities equally. Alert and appropriate                  Result Review :              Assessment and Plan     Diagnoses and all orders for this visit:    1. Urinary frequency (Primary)  Point-of-care urinalysis obtained and unremarkable for any signs of infection.  Urinalysis from urgent care 7/5 with moderate " blood, small leukocytes.  Culture positive for normal urogenital dinorah.  Given her symptoms it is possible be urogenital dinorah could be causing acute cystitis.  If this is the case, I would expect the Bactrim to improve symptoms over the next 24 hours.  At this time recommend that she complete the course of her Bactrim.    Patient advised to call if symptoms worsen  Also discussed possibility of interstitial cystitis and recommended treatment if symptoms continue.    2. Subcutaneous mass of abdominal wall  Undiagnosed new problem uncertain prognosis  Patient presents with tender subcutaneous mass just above her umbilicus present for the last month    Will obtain ultrasound to evaluate further    -     US Abdomen Limited; Future            Follow Up     No follow-ups on file.

## 2025-07-07 NOTE — LETTER
July 7, 2025     Patient: Gladys Garrison   YOB: 1960   Date of Visit: 7/7/2025       To Whom It May Concern:    Gladys Garrison was evaluated in the office on 7/7/2025 for illness. Please excuse her from work on 7/5 and 7/6.          Sincerely,        Abiodun Menjivar,     CC: No Recipients

## 2025-07-08 ENCOUNTER — SPECIALTY PHARMACY (OUTPATIENT)
Dept: PHARMACY | Facility: HOSPITAL | Age: 65
End: 2025-07-08
Payer: MEDICARE

## 2025-07-08 ENCOUNTER — PATIENT ROUNDING (BHMG ONLY) (OUTPATIENT)
Dept: URGENT CARE | Facility: CLINIC | Age: 65
End: 2025-07-08
Payer: MEDICARE

## 2025-07-08 DIAGNOSIS — C79.51 MALIGNANT NEOPLASM METASTATIC TO BONE: Primary | ICD-10-CM

## 2025-07-08 DIAGNOSIS — C50.919 MALIGNANT NEOPLASM OF FEMALE BREAST, UNSPECIFIED ESTROGEN RECEPTOR STATUS, UNSPECIFIED LATERALITY, UNSPECIFIED SITE OF BREAST: ICD-10-CM

## 2025-07-08 NOTE — PROGRESS NOTES
Specialty Pharmacy Patient Management Program  Oncology Refill Request         Re: Refills of Oral Specialty Medication - Verzenio (abemaciclib)    Drug-Drug Interactions: The current medication list was reviewed and there are no relevant drug-drug interactions with the specialty medication.  Medication Allergies: The patient has no relevant allergies as it relates to their oral specialty medication  Review of Labs/Dose Adjustments: NO DOSE CHANGE - I reviewed the most recent note and labs and the patient will continue without any dose changes.  I sent refills as described below.    Drug: Verzenio (abemaciclib)  Strength: 100 mg  Directions: Take 1 tablet by mouth twice a day  Quantity: 60  Refills: 5  Pharmacy prescription sent to: [facility] upon MD signature (pt receives from )    Verona Cowart Pharm.D.    7/8/2025  17:17 EDT

## 2025-07-08 NOTE — ED NOTES
Thank you for letting us care for you in your recent visit to our urgent care center. We would love to hear about your experience with us. Was this the first time you have visited our location?    We’re always looking for ways to make our patients’ experiences even better. Do you have any recommendations on ways we may improve?     I appreciate you taking the time to respond. Please be on the lookout for a survey about your recent visit from [employer] via text or email. We would greatly appreciate if you could fill that out and turn it back in. We want your voice to be heard and we value your feedback.   Thank you for choosing [facility] for your healthcare needs.     [facility] Practice Manager

## 2025-07-10 NOTE — PROGRESS NOTES
Specialty Pharmacy Patient Management Program  Per Protocol Prescription Order or Refill       Requested Prescriptions     Signed Prescriptions Disp Refills    Abemaciclib (VERZENIO) 100 mg tablet chemo tablet 60 tablet 5     Sig: Take 1 tablet by mouth 2 (Two) Times a Day. Take with or without food; Swallow whole, do not crush, chew or split tablets.     Prescription orders above were sent to [facility] per Collaborative Care Agreement Protocol.     Last office visit: 6/6/25  Next office visit: cancelled 7/7/25 appointment and request sent to scheduling for reschedule    Completed independent double check on medication order/RX.    Eda HIRSCH, PharmD  Clinical Specialty Pharmacist, Oncology/Hematology  7/10/2025  07:28 EDT

## 2025-07-12 ENCOUNTER — HOSPITAL ENCOUNTER (EMERGENCY)
Facility: HOSPITAL | Age: 65
Discharge: HOME | End: 2025-07-12
Payer: MEDICARE

## 2025-07-12 VITALS
WEIGHT: 118.39 LBS | HEIGHT: 60 IN | BODY MASS INDEX: 23.24 KG/M2 | TEMPERATURE: 97.9 F | OXYGEN SATURATION: 94 % | HEART RATE: 60 BPM | RESPIRATION RATE: 18 BRPM | DIASTOLIC BLOOD PRESSURE: 73 MMHG | SYSTOLIC BLOOD PRESSURE: 122 MMHG

## 2025-07-12 DIAGNOSIS — K59.00 CONSTIPATION, UNSPECIFIED CONSTIPATION TYPE: ICD-10-CM

## 2025-07-12 DIAGNOSIS — D72.819 LEUKOPENIA, UNSPECIFIED TYPE: ICD-10-CM

## 2025-07-12 DIAGNOSIS — N39.0 URINARY TRACT INFECTION WITHOUT HEMATURIA, SITE UNSPECIFIED: ICD-10-CM

## 2025-07-12 DIAGNOSIS — D64.9 CHRONIC ANEMIA: ICD-10-CM

## 2025-07-12 DIAGNOSIS — R11.2 NAUSEA AND VOMITING, UNSPECIFIED VOMITING TYPE: Primary | ICD-10-CM

## 2025-07-12 DIAGNOSIS — D69.59 CHEMOTHERAPY-INDUCED THROMBOCYTOPENIA: ICD-10-CM

## 2025-07-12 DIAGNOSIS — T45.1X5A CHEMOTHERAPY-INDUCED THROMBOCYTOPENIA: ICD-10-CM

## 2025-07-12 LAB
ALBUMIN SERPL-MCNC: 4.6 G/DL (ref 3.5–5.2)
ALBUMIN/GLOB SERPL: 1.4 G/DL
ALP SERPL-CCNC: 88 U/L (ref 39–117)
ALT SERPL W P-5'-P-CCNC: 15 U/L (ref 1–33)
ANION GAP SERPL CALCULATED.3IONS-SCNC: 11 MMOL/L (ref 5–15)
AST SERPL-CCNC: 36 U/L (ref 1–32)
B PARAPERT DNA SPEC QL NAA+PROBE: NOT DETECTED
B PERT DNA SPEC QL NAA+PROBE: NOT DETECTED
BACTERIA UR QL AUTO: ABNORMAL /HPF
BASOPHILS # BLD AUTO: 0.01 10*3/MM3 (ref 0–0.2)
BASOPHILS NFR BLD AUTO: 0.6 % (ref 0–1.5)
BILIRUB SERPL-MCNC: 0.4 MG/DL (ref 0–1.2)
BILIRUB UR QL STRIP: ABNORMAL
BUN SERPL-MCNC: 20.3 MG/DL (ref 8–23)
BUN/CREAT SERPL: 16.2 (ref 7–25)
C PNEUM DNA NPH QL NAA+NON-PROBE: NOT DETECTED
CALCIUM SPEC-SCNC: 9.5 MG/DL (ref 8.6–10.5)
CHLORIDE SERPL-SCNC: 101 MMOL/L (ref 98–107)
CLARITY UR: CLEAR
CO2 SERPL-SCNC: 23 MMOL/L (ref 22–29)
COLOR UR: ABNORMAL
CREAT SERPL-MCNC: 1.25 MG/DL (ref 0.57–1)
DEPRECATED RDW RBC AUTO: 55.1 FL (ref 37–54)
EGFRCR SERPLBLD CKD-EPI 2021: 48.8 ML/MIN/1.73
EOSINOPHIL # BLD AUTO: 0.01 10*3/MM3 (ref 0–0.4)
EOSINOPHIL NFR BLD AUTO: 0.6 % (ref 0.3–6.2)
ERYTHROCYTE [DISTWIDTH] IN BLOOD BY AUTOMATED COUNT: 15.5 % (ref 12.3–15.4)
FLUAV SUBTYP SPEC NAA+PROBE: NOT DETECTED
FLUBV RNA NPH QL NAA+NON-PROBE: NOT DETECTED
GLOBULIN UR ELPH-MCNC: 3.3 GM/DL
GLUCOSE SERPL-MCNC: 96 MG/DL (ref 65–99)
GLUCOSE UR STRIP-MCNC: NEGATIVE MG/DL
HADV DNA SPEC NAA+PROBE: NOT DETECTED
HCOV 229E RNA SPEC QL NAA+PROBE: NOT DETECTED
HCOV HKU1 RNA SPEC QL NAA+PROBE: NOT DETECTED
HCOV NL63 RNA SPEC QL NAA+PROBE: NOT DETECTED
HCOV OC43 RNA SPEC QL NAA+PROBE: NOT DETECTED
HCT VFR BLD AUTO: 27.9 % (ref 34–46.6)
HGB BLD-MCNC: 8.4 G/DL (ref 12–15.9)
HGB UR QL STRIP.AUTO: NEGATIVE
HMPV RNA NPH QL NAA+NON-PROBE: NOT DETECTED
HOLD SPECIMEN: NORMAL
HPIV1 RNA ISLT QL NAA+PROBE: NOT DETECTED
HPIV2 RNA SPEC QL NAA+PROBE: NOT DETECTED
HPIV3 RNA NPH QL NAA+PROBE: NOT DETECTED
HPIV4 P GENE NPH QL NAA+PROBE: NOT DETECTED
HYALINE CASTS UR QL AUTO: ABNORMAL /LPF
IMM GRANULOCYTES # BLD AUTO: 0 10*3/MM3 (ref 0–0.05)
IMM GRANULOCYTES NFR BLD AUTO: 0 % (ref 0–0.5)
KETONES UR QL STRIP: ABNORMAL
LEUKOCYTE ESTERASE UR QL STRIP.AUTO: ABNORMAL
LIPASE SERPL-CCNC: 18 U/L (ref 13–60)
LYMPHOCYTES # BLD AUTO: 0.22 10*3/MM3 (ref 0.7–3.1)
LYMPHOCYTES NFR BLD AUTO: 13.8 % (ref 19.6–45.3)
M PNEUMO IGG SER IA-ACNC: NOT DETECTED
MCH RBC QN AUTO: 29.1 PG (ref 26.6–33)
MCHC RBC AUTO-ENTMCNC: 30.1 G/DL (ref 31.5–35.7)
MCV RBC AUTO: 96.5 FL (ref 79–97)
MONOCYTES # BLD AUTO: 0.11 10*3/MM3 (ref 0.1–0.9)
MONOCYTES NFR BLD AUTO: 6.9 % (ref 5–12)
NEUTROPHILS NFR BLD AUTO: 1.25 10*3/MM3 (ref 1.7–7)
NEUTROPHILS NFR BLD AUTO: 78.1 % (ref 42.7–76)
NITRITE UR QL STRIP: NEGATIVE
NRBC BLD AUTO-RTO: 0 /100 WBC (ref 0–0.2)
PH UR STRIP.AUTO: 5.5 [PH] (ref 5–8)
PLATELET # BLD AUTO: 56 10*3/MM3 (ref 140–450)
PMV BLD AUTO: 12 FL (ref 6–12)
POTASSIUM SERPL-SCNC: 4.8 MMOL/L (ref 3.5–5.2)
PROT SERPL-MCNC: 7.9 G/DL (ref 6–8.5)
PROT UR QL STRIP: ABNORMAL
RBC # BLD AUTO: 2.89 10*6/MM3 (ref 3.77–5.28)
RBC # UR STRIP: ABNORMAL /HPF
REF LAB TEST METHOD: ABNORMAL
RHINOVIRUS RNA SPEC NAA+PROBE: NOT DETECTED
RSV RNA NPH QL NAA+NON-PROBE: NOT DETECTED
SARS-COV-2 RNA RESP QL NAA+PROBE: NOT DETECTED
SODIUM SERPL-SCNC: 135 MMOL/L (ref 136–145)
SP GR UR STRIP: 1.02 (ref 1–1.03)
SQUAMOUS #/AREA URNS HPF: ABNORMAL /HPF
UROBILINOGEN UR QL STRIP: ABNORMAL
WBC # UR STRIP: ABNORMAL /HPF
WBC NRBC COR # BLD AUTO: 1.6 10*3/MM3 (ref 3.4–10.8)

## 2025-07-12 PROCEDURE — 80053 COMPREHEN METABOLIC PANEL: CPT

## 2025-07-12 PROCEDURE — 96375 TX/PRO/DX INJ NEW DRUG ADDON: CPT

## 2025-07-12 PROCEDURE — 83690 ASSAY OF LIPASE: CPT

## 2025-07-12 PROCEDURE — 81001 URINALYSIS AUTO W/SCOPE: CPT

## 2025-07-12 PROCEDURE — 0202U NFCT DS 22 TRGT SARS-COV-2: CPT

## 2025-07-12 PROCEDURE — 87086 URINE CULTURE/COLONY COUNT: CPT

## 2025-07-12 PROCEDURE — 25010000002 ONDANSETRON PER 1 MG

## 2025-07-12 PROCEDURE — 96365 THER/PROPH/DIAG IV INF INIT: CPT

## 2025-07-12 PROCEDURE — 99283 EMERGENCY DEPT VISIT LOW MDM: CPT

## 2025-07-12 PROCEDURE — 25010000002 CEFTRIAXONE PER 250 MG

## 2025-07-12 PROCEDURE — 85025 COMPLETE CBC W/AUTO DIFF WBC: CPT

## 2025-07-12 PROCEDURE — 25810000003 SODIUM CHLORIDE 0.9 % SOLUTION

## 2025-07-12 RX ORDER — ONDANSETRON 4 MG/1
4 TABLET, ORALLY DISINTEGRATING ORAL EVERY 8 HOURS PRN
Qty: 15 TABLET | Refills: 0 | Status: SHIPPED | OUTPATIENT
Start: 2025-07-12

## 2025-07-12 RX ORDER — SODIUM CHLORIDE 0.9 % (FLUSH) 0.9 %
10 SYRINGE (ML) INJECTION AS NEEDED
Status: DISCONTINUED | OUTPATIENT
Start: 2025-07-12 | End: 2025-07-12 | Stop reason: HOSPADM

## 2025-07-12 RX ORDER — ONDANSETRON 2 MG/ML
4 INJECTION INTRAMUSCULAR; INTRAVENOUS ONCE
Status: COMPLETED | OUTPATIENT
Start: 2025-07-12 | End: 2025-07-12

## 2025-07-12 RX ADMIN — CEFTRIAXONE SODIUM 1000 MG: 1 INJECTION, POWDER, FOR SOLUTION INTRAMUSCULAR; INTRAVENOUS at 11:52

## 2025-07-12 RX ADMIN — ONDANSETRON 4 MG: 2 INJECTION, SOLUTION INTRAMUSCULAR; INTRAVENOUS at 11:41

## 2025-07-12 RX ADMIN — SODIUM CHLORIDE 1000 ML: 9 INJECTION, SOLUTION INTRAVENOUS at 11:41

## 2025-07-12 NOTE — DISCHARGE INSTRUCTIONS
Rest.  Drink plenty fluids and increase your fiber intake.  Take home medications as prescribed.  May use over-the-counter laxatives for your constipation.  Follow-up with primary care, call Monday to schedule an appointment and to follow-up on the status of the ultrasound for your umbilical hernia.  Return to the ER for any new or worsening symptoms.

## 2025-07-12 NOTE — ED PROVIDER NOTES
Subjective   History of Present Illness  Chief complaint: Nausea x 2 days, vomiting today.      Context: Patient is a 64-year-old female with a history of breast and bone cancer, type 2 diabetes, CAD, hep C, hypertension, bradycardia and heart murmur presents to the ER with complaint of nausea for the past 2 days and vomiting that started today.  Patient reports she ate a small bit of Chinese food last night, and had 1 donut and some coffee this morning started feeling bad at work and started vomiting several times until there is nothing left.  Patient reports she has had some decreased oral intake for the past week or so.  Patient denies any chest pain, shortness of air, fever or abdominal pain.  Patient denies any urinary symptoms or diarrhea.       PCP: Chirag Robles    LMP: Postmenopausal          Review of Systems   Constitutional:  Negative for fever.       Past Medical History:   Diagnosis Date    Allergic     Bone cancer     METASTATIC BONE; stage 4    Bradycardia     SECONDARY TO ABLATION    Breast cancer 2017    mets to lymph nodes; did not do radiation    Cancer of unknown origin     Compression fx, thoracic spine, open, initial encounter     Coronary artery disease     COVID-19 09/01/2021    Diabetes mellitus     Heart defect, congenital 1962    tratology of flow (spelling?)    Heart murmur     Hepatitis C     RESOLVED WITH MEDICATION    Hyperlipidemia     Hypertension     Leaky heart valve     tricuspid & mitral valves leak; not yet enough to warrent another open heart surgery    Obesity (BMI 30-39.9) 02/05/2021    Pacemaker     dependent, because of ablations to treat tachycardia    Rib fracture     Per patient    Sepsis 01/2025    Sleep apnea     no machine    Tachycardia     ATRIAL    Type 2 diabetes mellitus 11/2017       Allergies   Allergen Reactions    Promethazine Other (See Comments)     Hyperactive mean    Tape Other (See Comments)     .blisters      Adhesive Tape Rash    Flexeril  [Cyclobenzaprine] Rash       Past Surgical History:   Procedure Laterality Date    BACK SURGERY      neck X 2    BREAST RECONSTRUCTION Bilateral     CARDIAC ABLATION       atrial tachycardia x 5 ablations     CARDIAC CATHETERIZATION      CARDIAC ELECTROPHYSIOLOGY PROCEDURE N/A 2023    Procedure: Pacemaker RV lead insertion with generator change out. [employer];  Surgeon: Steven Hammond MD;  Location: [facility] CATH INVASIVE LOCATION;  Service: Cardiovascular;  Laterality: N/A;    CARDIAC SURGERY      stent placed in aorta    CARDIAC SURGERY      6 surgeries as baby     CERVICAL FUSION ANTERIOR WITH ARTIFICIAL DISCECTOMY IMPLANTATION N/A 2020    Procedure: C4 VERTEBRECTOMY AND ANTERIOR CERIVCAL DISCECTOMY WITH FUSION OF CERVICAL THREE THROUGH FIVE WITH REMOVAL OF HARDWARE C5-C6;  Surgeon: Mark Kaba MD;  Location: [facility] MAIN OR;  Service: Neurosurgery;  Laterality: N/A;     SECTION      x2    COLONOSCOPY N/A 10/23/2020    Procedure: COLONOSCOPY WITH POLYPECTOMY X6;  Surgeon: Stanley Bourne MD;  Location: [facility] ENDOSCOPY;  Service: Gastroenterology;  Laterality: N/A;  POLYPS, INTERNAL HEMORRHOIDS    ENDOMETRIAL ABLATION      REMOVAL SCAR TISSUE UTERINE    ENDOSCOPY N/A 10/23/2020    Procedure: ESOPHAGOGASTRODUODENOSCOPY WITH BIOPSY X 1 AREA;  Surgeon: Stanley Bourne MD;  Location: [facility] ENDOSCOPY;  Service: Gastroenterology;  Laterality: N/A;  GASTRITIS, ESOPHAGITIS, HIATAL HERNIA    INSERT / REPLACE / REMOVE PACEMAKER      KNEE SURGERY      MASTECTOMY Bilateral     NECK SURGERY      PACEMAKER IMPLANTATION      [employer]    PAIN PUMP INSERTION/REVISION N/A 2022    Procedure: PAIN PUMP INSERTION AND INTRATHECAL CATHETER PLACEMENT;  Surgeon: Chuck Hunter MD;  Location: [facility] MAIN OR;  Service: Pain Management;  Laterality: N/A;       Family History   Problem Relation Age of Onset    Heart disease Mother     Stroke Mother     Lung cancer Mother      Aneurysm Father     Diabetes Sister     No Known Problems Brother     No Known Problems Brother     Diabetes type I Half-Sister     Thyroid cancer Half-Sister     Cancer Maternal Aunt     Heart attack Sister     Thyroid disease Sister     No Known Problems Sister        Social History     Socioeconomic History    Marital status:     Number of children: 2   Tobacco Use    Smoking status: Never     Passive exposure: Never    Smokeless tobacco: Never   Vaping Use    Vaping status: Never Used   Substance and Sexual Activity    Alcohol use: Yes     Comment: drinks rarely    Drug use: Never    Sexual activity: Defer           Objective   Physical Exam  Vitals and nursing note reviewed.   Constitutional:       Appearance: Normal appearance.   HENT:      Head: Normocephalic.      Nose: Nose normal.      Mouth/Throat:      Mouth: Mucous membranes are moist.   Eyes:      Extraocular Movements: Extraocular movements intact.   Cardiovascular:      Rate and Rhythm: Normal rate and regular rhythm.      Pulses: Normal pulses.      Heart sounds: Murmur heard.   Pulmonary:      Effort: Pulmonary effort is normal.      Breath sounds: Normal breath sounds. No wheezing.   Abdominal:      Palpations: Abdomen is soft.      Tenderness: There is no abdominal tenderness. There is no guarding or rebound.      Hernia: A hernia is present. Hernia is present in the umbilical area.      Comments: Small umbilical hernia noted, easily reducible, no tenderness noted, no signs of incarceration or strangulation.   Musculoskeletal:         General: Normal range of motion.      Cervical back: Normal range of motion and neck supple.   Skin:     General: Skin is warm and dry.      Capillary Refill: Capillary refill takes less than 2 seconds.   Neurological:      General: No focal deficit present.      Mental Status: She is alert and oriented to person, place, and time.   Psychiatric:         Mood and Affect: Mood normal.         Behavior:  "Behavior normal.         Procedures           ED Course           /73   Pulse 60   Temp 97.9 °F (36.6 °C)   Resp 18   Ht 152.4 cm (60\")   Wt 53.7 kg (118 lb 6.2 oz)   LMP  (LMP Unknown)   SpO2 94%   BMI 23.12 kg/m²   Labs Reviewed   COMPREHENSIVE METABOLIC PANEL - Abnormal; Notable for the following components:       Result Value    Creatinine 1.25 (*)     Sodium 135 (*)     AST (SGOT) 36 (*)     eGFR 48.8 (*)     All other components within normal limits    Narrative:     GFR Categories in Chronic Kidney Disease (CKD)              GFR Category          GFR (mL/min/1.73)    Interpretation  G1                    90 or greater        Normal or high (1)  G2                    60-89                Mild decrease (1)  G3a                   45-59                Mild to moderate decrease  G3b                   30-44                Moderate to severe decrease  G4                    15-29                Severe decrease  G5                    14 or less           Kidney failure    (1)In the absence of evidence of kidney disease, neither GFR category G1 or G2 fulfill the criteria for CKD.    eGFR calculation 2021 CKD-EPI creatinine equation, which does not include race as a factor   URINALYSIS W/ MICROSCOPIC IF INDICATED (NO CULTURE) - Abnormal; Notable for the following components:    Color, UA Dark Yellow (*)     Ketones, UA Trace (*)     Bilirubin, UA Small (1+) (*)     Protein, UA 30 mg/dL (1+) (*)     Leuk Esterase, UA Small (1+) (*)     All other components within normal limits   CBC WITH AUTO DIFFERENTIAL - Abnormal; Notable for the following components:    WBC 1.60 (*)     RBC 2.89 (*)     Hemoglobin 8.4 (*)     Hematocrit 27.9 (*)     MCHC 30.1 (*)     RDW 15.5 (*)     RDW-SD 55.1 (*)     Platelets 56 (*)     Neutrophil % 78.1 (*)     Lymphocyte % 13.8 (*)     Neutrophils, Absolute 1.25 (*)     Lymphocytes, Absolute 0.22 (*)     All other components within normal limits   URINALYSIS, MICROSCOPIC ONLY - " Abnormal; Notable for the following components:    WBC, UA 6-10 (*)     Squamous Epithelial Cells, UA 3-6 (*)     All other components within normal limits   RESPIRATORY PANEL PCR W/ COVID-19 (SARS-COV-2), NP SWAB IN UTM/VTP, 2 HR TAT - Normal    Narrative:     In the setting of a positive respiratory panel with a viral infection PLUS a negative procalcitonin without other underlying concern for bacterial infection, consider observing off antibiotics or discontinuation of antibiotics and continue supportive care. If the respiratory panel is positive for atypical bacterial infection (Bordetella pertussis, Chlamydophila pneumoniae, or Mycoplasma pneumoniae), consider antibiotic de-escalation to target atypical bacterial infection.   LIPASE - Normal   URINE CULTURE   CBC AND DIFFERENTIAL    Narrative:     The following orders were created for panel order CBC & Differential.  Procedure                               Abnormality         Status                     ---------                               -----------         ------                     CBC Auto Differential[[id number]]        Abnormal            Final result                 Please view results for these tests on the individual orders.   EXTRA TUBES    Narrative:     The following orders were created for panel order Extra Tubes.  Procedure                               Abnormality         Status                     ---------                               -----------         ------                     Gold Top - SST[[id number]]                                   Final result                 Please view results for these tests on the individual orders.   GOLD TOP - SST     Medications   ondansetron (ZOFRAN) injection 4 mg (4 mg Intravenous Given 7/12/25 1141)   sodium chloride 0.9 % bolus 1,000 mL (0 mL Intravenous Stopped 7/12/25 1340)   cefTRIAXone (ROCEPHIN) 1,000 mg in sodium chloride 0.9 % 100 mL MBP (0 mg Intravenous Stopped 7/12/25 1225)     No radiology  results for the last day                                              Medical Decision Making  Chart review:    Radiology was considered but was not emergently warranted at this time.     Lab interpretation:  Labs all viewed by me and significant for: White count 1.60, hemoglobin 8.4, platelets 56, BUN 20.3, creatinine 1.25, sodium 135, potassium 4.8, ALT 15, AST 36, lipase 18.  RPP-negative.  UA-trace ketones, small 1+ bilirubin, 30 protein, small 1+ leuks, 6-10 WBCs, 3-6 squames.  Urine culture pending.    EKG was considered but not emergently warranted at this time.    IV was established and labs were obtained to evaluate for infection, electrolyte abnormalities, anemia, dehydration, pancreatitis, UTI, viral infection, this is not an all-inclusive list.  Patient is a 64-year-old female with a history of breast and bone cancer, type 2 diabetes, CAD, hep C, hypertension, bradycardia murmur who presents to the ER for above, comfortably in the bed, nontoxic in appearance with no signs and symptoms of distress with spouse at bedside.  Physical examination revealed heart regular rate and rhythm with murmur noted, lungs clear and equal bilaterally on auscultation, no tenderness to abdomen on palpation noted, small umbilical hernia noted that is easily reducible, no signs of incarceration, and no tenderness noted to area, no swelling in bilateral lower extremities noted.  Patient reports her nausea started yesterday and she vomited several times today into there was nothing to vomit.  Patient also reports some decreased oral intake, states she has taken her chemo medication due to her nausea.  Patient reports her chemo medicine makes her feel very nauseous and sick feeling so when she starts feeling bad she stops taking the chemo medication until she starts feeling better.  Patient also reports some constipation, states she did have a small bowel movement today and 1 to 3 days ago and prior to that was 4 days prior but  all of them been small and not regular in nature.  Patient was given IV fluids and IV Zofran.  Offered to give patient an enema here in the ER which patient refused, states she was at use over-the-counter medication to alleviate her constipation.  Patient has been on Bactrim for UTI, and urine still shows small signs of infection patient was given IV Rocephin.  Labs indicated leukopenia, thrombocytopenia, and anemia however all of these levels were near baseline normal.  Reexamination patient was resting comfortably in the bed, nontoxic in appearance with no signs and symptoms of distress stating that she was feeling better after the medications and fluids.  Discussed findings and decision to discharge.  Advised patient to continue taking her Bactrim, advised her to rest, drink plenty of fluids, take medications as prescribed and to completion, follow-up with your primary care and return to the ER for any new or worsening symptoms.  Patient reports that she will take some over-the-counter laxatives for her constipation and follow-up with her primary care.  Patient was here for discharge and verbalized understanding of all discharge instructions.    Appropriate PPE worn during exam.    i discussed findings with patient who voices understanding of discharge instructions, signs and symptoms requiring return to ED; discharged improved and in stable condition with follow up for re-evaluation.  This document is intended for medical expert use only. Reading of this document by patients and/or patient's family without participating medical staff guidance may result in misinterpretation and unintended morbidity.  Any interpretation of such data is the responsibility of the patient and/or family member responsible for the patient in concert with their primary or specialist providers, not to be left for sources of online searches such as [employer], [employer] or similar queries. Relying on these approaches to knowledge may result in  misinterpretation, misguided goals of care and even death should patients or family members try recommendations outside of the realm of professional medical care in a supervised inpatient environment.         Problems Addressed:  Chemotherapy-induced thrombocytopenia: complicated acute illness or injury  Chronic anemia: complicated acute illness or injury  Constipation, unspecified constipation type: complicated acute illness or injury  Leukopenia, unspecified type: complicated acute illness or injury  Nausea and vomiting, unspecified vomiting type: complicated acute illness or injury  Urinary tract infection without hematuria, site unspecified: complicated acute illness or injury    Amount and/or Complexity of Data Reviewed  Labs: ordered.    Risk  Prescription drug management.        Final diagnoses:   Nausea and vomiting, unspecified vomiting type   Urinary tract infection without hematuria, site unspecified   Leukopenia, unspecified type   Chronic anemia   Chemotherapy-induced thrombocytopenia   Constipation, unspecified constipation type       ED Disposition  ED Disposition       ED Disposition   Discharge    Condition   Stable    Comment   --               Chirag Robles, DO  [address]  [place] [address]  [phone]    Schedule an appointment as soon as possible for a visit   Call Monday to schedule an appointment for follow-up.         Medication List        New Prescriptions      ondansetron ODT 4 MG disintegrating tablet  Commonly known as: ZOFRAN-ODT  Take 1 tablet by mouth Every 8 (Eight) Hours As Needed for Nausea or Vomiting.            Changed      fexofenadine 180 MG tablet  Commonly known as: ALLEGRA  What changed: when to take this     lisinopril 20 MG tablet  Commonly known as: PRINIVIL,ZESTRIL  Take 1 tablet by mouth Daily.  What changed: additional instructions               Where to Get Your Medications        These medications were sent to [facility]  - [place], [place] - [address] - [phone]  - [phone]  [address], [place] [address]      Phone: [phone]   ondansetron ODT 4 MG disintegrating tablet            Shanthi Gillespie, APRN  07/12/25 7515

## 2025-07-13 LAB — BACTERIA SPEC AEROBE CULT: NORMAL

## 2025-07-14 LAB
MC_CV_MDC_IDC_RATE_1: 150
MC_CV_MDC_IDC_RATE_1: 171
MC_CV_MDC_IDC_THERAPIES: NORMAL
MC_CV_MDC_IDC_ZONE_ID: 2
MC_CV_MDC_IDC_ZONE_ID: 6
MDC_IDC_MSMT_BATTERY_REMAINING_LONGEVITY: 100 MO
MDC_IDC_MSMT_BATTERY_RRT_TRIGGER: 2.6
MDC_IDC_MSMT_BATTERY_STATUS: NORMAL
MDC_IDC_MSMT_BATTERY_VOLTAGE: 2.98
MDC_IDC_MSMT_LEADCHNL_LV_DTM: NORMAL
MDC_IDC_MSMT_LEADCHNL_LV_IMPEDANCE_VALUE: 418
MDC_IDC_MSMT_LEADCHNL_LV_PACING_THRESHOLD_AMPLITUDE: 0.75
MDC_IDC_MSMT_LEADCHNL_LV_PACING_THRESHOLD_POLARITY: NORMAL
MDC_IDC_MSMT_LEADCHNL_LV_PACING_THRESHOLD_PULSEWIDTH: 0.4
MDC_IDC_MSMT_LEADCHNL_RA_DTM: NORMAL
MDC_IDC_MSMT_LEADCHNL_RA_IMPEDANCE_VALUE: 323
MDC_IDC_MSMT_LEADCHNL_RA_PACING_THRESHOLD_AMPLITUDE: 0.75
MDC_IDC_MSMT_LEADCHNL_RA_PACING_THRESHOLD_POLARITY: NORMAL
MDC_IDC_MSMT_LEADCHNL_RA_PACING_THRESHOLD_PULSEWIDTH: 0.4
MDC_IDC_MSMT_LEADCHNL_RA_SENSING_INTR_AMPL: 0.25
MDC_IDC_MSMT_LEADCHNL_RV_DTM: NORMAL
MDC_IDC_MSMT_LEADCHNL_RV_IMPEDANCE_VALUE: 361
MDC_IDC_MSMT_LEADCHNL_RV_PACING_THRESHOLD_AMPLITUDE: 1.12
MDC_IDC_MSMT_LEADCHNL_RV_PACING_THRESHOLD_POLARITY: NORMAL
MDC_IDC_MSMT_LEADCHNL_RV_PACING_THRESHOLD_PULSEWIDTH: 0.4
MDC_IDC_MSMT_LEADCHNL_RV_SENSING_INTR_AMPL: 3
MDC_IDC_PG_IMPLANT_DTM: NORMAL
MDC_IDC_PG_MFG: NORMAL
MDC_IDC_PG_MODEL: NORMAL
MDC_IDC_PG_SERIAL: NORMAL
MDC_IDC_PG_TYPE: NORMAL
MDC_IDC_SESS_DTM: NORMAL
MDC_IDC_SESS_TYPE: NORMAL
MDC_IDC_SET_BRADY_AT_MODE_SWITCH_RATE: 171
MDC_IDC_SET_BRADY_LOWRATE: 60
MDC_IDC_SET_BRADY_MAX_SENSOR_RATE: 120
MDC_IDC_SET_BRADY_MAX_TRACKING_RATE: 130
MDC_IDC_SET_BRADY_MODE: NORMAL
MDC_IDC_SET_BRADY_PAV_DELAY: 130
MDC_IDC_SET_BRADY_SAV_DELAY: 100
MDC_IDC_SET_CRT_LVRV_DELAY: 0
MDC_IDC_SET_CRT_PACED_CHAMBERS: NORMAL
MDC_IDC_SET_LEADCHNL_LV_PACING_AMPLITUDE: 1.25
MDC_IDC_SET_LEADCHNL_LV_PACING_POLARITY: NORMAL
MDC_IDC_SET_LEADCHNL_LV_PACING_PULSEWIDTH: 0.4
MDC_IDC_SET_LEADCHNL_RA_PACING_AMPLITUDE: 1.5
MDC_IDC_SET_LEADCHNL_RA_PACING_POLARITY: NORMAL
MDC_IDC_SET_LEADCHNL_RA_PACING_PULSEWIDTH: 0.4
MDC_IDC_SET_LEADCHNL_RA_SENSING_POLARITY: NORMAL
MDC_IDC_SET_LEADCHNL_RA_SENSING_SENSITIVITY: 0.3
MDC_IDC_SET_LEADCHNL_RV_PACING_AMPLITUDE: 2.25
MDC_IDC_SET_LEADCHNL_RV_PACING_POLARITY: NORMAL
MDC_IDC_SET_LEADCHNL_RV_PACING_PULSEWIDTH: 0.4
MDC_IDC_SET_LEADCHNL_RV_SENSING_POLARITY: NORMAL
MDC_IDC_SET_LEADCHNL_RV_SENSING_SENSITIVITY: 0.9
MDC_IDC_SET_ZONE_STATUS: NORMAL
MDC_IDC_SET_ZONE_STATUS: NORMAL
MDC_IDC_SET_ZONE_TYPE: NORMAL
MDC_IDC_SET_ZONE_TYPE: NORMAL
MDC_IDC_STAT_AT_BURDEN_PERCENT: 17.3
MDC_IDC_STAT_BRADY_RA_PERCENT_PACED: 82.35

## 2025-07-21 ENCOUNTER — HOSPITAL ENCOUNTER (OUTPATIENT)
Dept: ULTRASOUND IMAGING | Facility: HOSPITAL | Age: 65
Discharge: HOME | End: 2025-07-21
Admitting: STUDENT IN AN ORGANIZED HEALTH CARE EDUCATION/TRAINING PROGRAM
Payer: MEDICARE

## 2025-07-21 DIAGNOSIS — R22.2 SUBCUTANEOUS MASS OF ABDOMINAL WALL: ICD-10-CM

## 2025-07-21 PROCEDURE — 76705 ECHO EXAM OF ABDOMEN: CPT

## 2025-08-12 ENCOUNTER — RESULTS FOLLOW-UP (OUTPATIENT)
Dept: LAB | Facility: HOSPITAL | Age: 65
End: 2025-08-12
Payer: MEDICARE

## 2025-08-12 ENCOUNTER — LAB (OUTPATIENT)
Dept: LAB | Facility: HOSPITAL | Age: 65
End: 2025-08-12
Payer: MEDICARE

## 2025-08-12 ENCOUNTER — OFFICE VISIT (OUTPATIENT)
Dept: ONCOLOGY | Facility: CLINIC | Age: 65
End: 2025-08-12
Payer: MEDICARE

## 2025-08-12 VITALS
BODY MASS INDEX: 23.16 KG/M2 | OXYGEN SATURATION: 96 % | SYSTOLIC BLOOD PRESSURE: 133 MMHG | HEART RATE: 87 BPM | DIASTOLIC BLOOD PRESSURE: 72 MMHG | WEIGHT: 118 LBS | RESPIRATION RATE: 18 BRPM | TEMPERATURE: 98.4 F | HEIGHT: 60 IN

## 2025-08-12 DIAGNOSIS — C79.51 MALIGNANT NEOPLASM METASTATIC TO BONE: ICD-10-CM

## 2025-08-12 DIAGNOSIS — C50.919 MALIGNANT NEOPLASM OF FEMALE BREAST, UNSPECIFIED ESTROGEN RECEPTOR STATUS, UNSPECIFIED LATERALITY, UNSPECIFIED SITE OF BREAST: Primary | ICD-10-CM

## 2025-08-12 DIAGNOSIS — R94.8 ABNORMAL RADIONUCLIDE BONE SCAN: ICD-10-CM

## 2025-08-12 DIAGNOSIS — I10 PRIMARY HYPERTENSION: ICD-10-CM

## 2025-08-12 DIAGNOSIS — I50.32 HEART FAILURE DUE TO VALVULAR DISEASE, CHRONIC, DIASTOLIC: ICD-10-CM

## 2025-08-12 DIAGNOSIS — I38 HEART FAILURE DUE TO VALVULAR DISEASE, CHRONIC, DIASTOLIC: ICD-10-CM

## 2025-08-12 LAB
ALBUMIN SERPL-MCNC: 4.2 G/DL (ref 3.5–5.2)
ALBUMIN SERPL-MCNC: 4.4 G/DL (ref 3.5–5.2)
ALBUMIN/GLOB SERPL: 1.4 G/DL
ALBUMIN/GLOB SERPL: 1.8 G/DL
ALP SERPL-CCNC: 80 U/L (ref 39–117)
ALP SERPL-CCNC: 83 U/L (ref 39–117)
ALT SERPL W P-5'-P-CCNC: 13 U/L (ref 1–33)
ALT SERPL W P-5'-P-CCNC: 16 U/L (ref 1–33)
ANION GAP SERPL CALCULATED.3IONS-SCNC: 11 MMOL/L (ref 5–15)
ANION GAP SERPL CALCULATED.3IONS-SCNC: 9.6 MMOL/L (ref 5–15)
AST SERPL-CCNC: 32 U/L (ref 1–32)
AST SERPL-CCNC: 34 U/L (ref 1–32)
BASOPHILS # BLD AUTO: 0.01 10*3/MM3 (ref 0–0.2)
BASOPHILS NFR BLD AUTO: 0.5 % (ref 0–1.5)
BILIRUB SERPL-MCNC: 0.5 MG/DL (ref 0–1.2)
BILIRUB SERPL-MCNC: 0.5 MG/DL (ref 0–1.2)
BUN SERPL-MCNC: 27.1 MG/DL (ref 8–23)
BUN SERPL-MCNC: 28.1 MG/DL (ref 8–23)
BUN/CREAT SERPL: 20.5 (ref 7–25)
BUN/CREAT SERPL: 21.3 (ref 7–25)
CALCIUM SPEC-SCNC: 9.3 MG/DL (ref 8.6–10.5)
CALCIUM SPEC-SCNC: 9.4 MG/DL (ref 8.6–10.5)
CHLORIDE SERPL-SCNC: 103 MMOL/L (ref 98–107)
CHLORIDE SERPL-SCNC: 104 MMOL/L (ref 98–107)
CO2 SERPL-SCNC: 27 MMOL/L (ref 22–29)
CO2 SERPL-SCNC: 27.4 MMOL/L (ref 22–29)
CREAT SERPL-MCNC: 1.32 MG/DL (ref 0.57–1)
CREAT SERPL-MCNC: 1.32 MG/DL (ref 0.57–1)
DEPRECATED RDW RBC AUTO: 52.6 FL (ref 37–54)
EGFRCR SERPLBLD CKD-EPI 2021: 45.7 ML/MIN/1.73
EGFRCR SERPLBLD CKD-EPI 2021: 45.7 ML/MIN/1.73
EOSINOPHIL # BLD AUTO: 0.03 10*3/MM3 (ref 0–0.4)
EOSINOPHIL NFR BLD AUTO: 1.5 % (ref 0.3–6.2)
ERYTHROCYTE [DISTWIDTH] IN BLOOD BY AUTOMATED COUNT: 14.5 % (ref 12.3–15.4)
GLOBULIN UR ELPH-MCNC: 2.5 GM/DL
GLOBULIN UR ELPH-MCNC: 3 GM/DL
GLUCOSE SERPL-MCNC: 140 MG/DL (ref 65–99)
GLUCOSE SERPL-MCNC: 143 MG/DL (ref 65–99)
HCT VFR BLD AUTO: 30.7 % (ref 34–46.6)
HGB BLD-MCNC: 9.5 G/DL (ref 12–15.9)
HOLD SPECIMEN: NORMAL
HOLD SPECIMEN: NORMAL
IMM GRANULOCYTES # BLD AUTO: 0 10*3/MM3 (ref 0–0.05)
IMM GRANULOCYTES NFR BLD AUTO: 0 % (ref 0–0.5)
LYMPHOCYTES # BLD AUTO: 0.34 10*3/MM3 (ref 0.7–3.1)
LYMPHOCYTES NFR BLD AUTO: 16.9 % (ref 19.6–45.3)
MCH RBC QN AUTO: 29.9 PG (ref 26.6–33)
MCHC RBC AUTO-ENTMCNC: 30.9 G/DL (ref 31.5–35.7)
MCV RBC AUTO: 96.5 FL (ref 79–97)
MONOCYTES # BLD AUTO: 0.09 10*3/MM3 (ref 0.1–0.9)
MONOCYTES NFR BLD AUTO: 4.5 % (ref 5–12)
NEUTROPHILS NFR BLD AUTO: 1.54 10*3/MM3 (ref 1.7–7)
NEUTROPHILS NFR BLD AUTO: 76.6 % (ref 42.7–76)
PLATELET # BLD AUTO: 38 10*3/MM3 (ref 140–450)
PMV BLD AUTO: 9.1 FL (ref 6–12)
POTASSIUM SERPL-SCNC: 4 MMOL/L (ref 3.5–5.2)
POTASSIUM SERPL-SCNC: 4 MMOL/L (ref 3.5–5.2)
PROT SERPL-MCNC: 6.9 G/DL (ref 6–8.5)
PROT SERPL-MCNC: 7.2 G/DL (ref 6–8.5)
RBC # BLD AUTO: 3.18 10*6/MM3 (ref 3.77–5.28)
SODIUM SERPL-SCNC: 140 MMOL/L (ref 136–145)
SODIUM SERPL-SCNC: 142 MMOL/L (ref 136–145)
WBC NRBC COR # BLD AUTO: 2.01 10*3/MM3 (ref 3.4–10.8)

## 2025-08-12 PROCEDURE — 85025 COMPLETE CBC W/AUTO DIFF WBC: CPT

## 2025-08-12 PROCEDURE — 36415 COLL VENOUS BLD VENIPUNCTURE: CPT

## 2025-08-12 PROCEDURE — 80053 COMPREHEN METABOLIC PANEL: CPT | Performed by: INTERNAL MEDICINE

## 2025-08-13 ENCOUNTER — SPECIALTY PHARMACY (OUTPATIENT)
Dept: PHARMACY | Facility: HOSPITAL | Age: 65
End: 2025-08-13
Payer: MEDICARE

## 2025-08-14 ENCOUNTER — SPECIALTY PHARMACY (OUTPATIENT)
Dept: PHARMACY | Facility: HOSPITAL | Age: 65
End: 2025-08-14
Payer: MEDICARE

## 2025-08-15 ENCOUNTER — SPECIALTY PHARMACY (OUTPATIENT)
Dept: PHARMACY | Facility: HOSPITAL | Age: 65
End: 2025-08-15
Payer: MEDICARE

## 2025-08-18 ENCOUNTER — OFFICE VISIT (OUTPATIENT)
Dept: FAMILY MEDICINE CLINIC | Facility: CLINIC | Age: 65
End: 2025-08-18
Payer: MEDICARE

## 2025-08-18 ENCOUNTER — APPOINTMENT (OUTPATIENT)
Dept: GENERAL RADIOLOGY | Facility: HOSPITAL | Age: 65
End: 2025-08-18
Payer: MEDICARE

## 2025-08-18 ENCOUNTER — HOSPITAL ENCOUNTER (INPATIENT)
Facility: HOSPITAL | Age: 65
LOS: 1 days | Discharge: HOME | End: 2025-08-19
Attending: EMERGENCY MEDICINE | Admitting: HOSPITALIST
Payer: MEDICARE

## 2025-08-18 VITALS
HEIGHT: 60 IN | HEART RATE: 84 BPM | DIASTOLIC BLOOD PRESSURE: 78 MMHG | WEIGHT: 119.6 LBS | BODY MASS INDEX: 23.48 KG/M2 | OXYGEN SATURATION: 100 % | RESPIRATION RATE: 18 BRPM | SYSTOLIC BLOOD PRESSURE: 138 MMHG

## 2025-08-18 DIAGNOSIS — I50.20 SYSTOLIC CONGESTIVE HEART FAILURE, UNSPECIFIED HF CHRONICITY: Primary | ICD-10-CM

## 2025-08-18 DIAGNOSIS — R06.09 DYSPNEA ON EXERTION: ICD-10-CM

## 2025-08-18 DIAGNOSIS — R60.0 BILATERAL LEG EDEMA: ICD-10-CM

## 2025-08-18 PROBLEM — I50.9 ACUTE EXACERBATION OF CHF (CONGESTIVE HEART FAILURE): Status: ACTIVE | Noted: 2025-08-18

## 2025-08-18 PROCEDURE — 1160F RVW MEDS BY RX/DR IN RCRD: CPT | Performed by: NURSE PRACTITIONER

## 2025-08-18 PROCEDURE — 3075F SYST BP GE 130 - 139MM HG: CPT | Performed by: NURSE PRACTITIONER

## 2025-08-18 PROCEDURE — 3078F DIAST BP <80 MM HG: CPT | Performed by: NURSE PRACTITIONER

## 2025-08-18 PROCEDURE — 1159F MED LIST DOCD IN RCRD: CPT | Performed by: NURSE PRACTITIONER

## 2025-08-18 PROCEDURE — 99213 OFFICE O/P EST LOW 20 MIN: CPT | Performed by: NURSE PRACTITIONER

## 2025-08-18 PROCEDURE — G2211 COMPLEX E/M VISIT ADD ON: HCPCS | Performed by: NURSE PRACTITIONER

## 2025-08-18 PROCEDURE — 1125F AMNT PAIN NOTED PAIN PRSNT: CPT | Performed by: NURSE PRACTITIONER

## 2025-08-18 RX ORDER — FUROSEMIDE 20 MG/1
20 TABLET ORAL 2 TIMES DAILY
COMMUNITY

## 2025-08-19 ENCOUNTER — READMISSION MANAGEMENT (OUTPATIENT)
Dept: CALL CENTER | Facility: HOSPITAL | Age: 65
End: 2025-08-19
Payer: MEDICARE

## 2025-08-20 ENCOUNTER — TRANSITIONAL CARE MANAGEMENT TELEPHONE ENCOUNTER (OUTPATIENT)
Dept: CALL CENTER | Facility: HOSPITAL | Age: 65
End: 2025-08-20
Payer: MEDICARE

## 2025-08-21 ENCOUNTER — TRANSITIONAL CARE MANAGEMENT TELEPHONE ENCOUNTER (OUTPATIENT)
Dept: CALL CENTER | Facility: HOSPITAL | Age: 65
End: 2025-08-21
Payer: MEDICARE

## 2025-08-22 ENCOUNTER — HOSPITAL ENCOUNTER (OUTPATIENT)
Dept: NUCLEAR MEDICINE | Facility: HOSPITAL | Age: 65
Discharge: HOME | End: 2025-08-22
Payer: MEDICARE

## 2025-08-25 ENCOUNTER — HOSPITAL ENCOUNTER (OUTPATIENT)
Dept: PET IMAGING | Facility: HOSPITAL | Age: 65
Discharge: HOME | End: 2025-08-25
Admitting: INTERNAL MEDICINE
Payer: MEDICARE

## 2025-08-25 DIAGNOSIS — C50.919 MALIGNANT NEOPLASM OF FEMALE BREAST, UNSPECIFIED ESTROGEN RECEPTOR STATUS, UNSPECIFIED LATERALITY, UNSPECIFIED SITE OF BREAST: ICD-10-CM

## 2025-08-25 DIAGNOSIS — R94.8 ABNORMAL RADIONUCLIDE BONE SCAN: ICD-10-CM

## 2025-08-25 DIAGNOSIS — C79.51 MALIGNANT NEOPLASM METASTATIC TO BONE: ICD-10-CM

## 2025-08-25 PROCEDURE — 74176 CT ABD & PELVIS W/O CONTRAST: CPT

## 2025-08-25 PROCEDURE — 71250 CT THORAX DX C-: CPT

## 2025-08-29 ENCOUNTER — SPECIALTY PHARMACY (OUTPATIENT)
Dept: PHARMACY | Facility: HOSPITAL | Age: 65
End: 2025-08-29
Payer: MEDICARE

## (undated) DEVICE — SLITTER CATH GUIDE ATTAIN ADJ

## (undated) DEVICE — DRSNG WND BORDR/ADHS NONADHR/GZ LF 4X4IN STRL

## (undated) DEVICE — SUT SILK 2/0 SH CR8 18IN CR8 C012D

## (undated) DEVICE — SUT MONOCRYL 4/0 PS2 27IN Y426H ETY426H

## (undated) DEVICE — 3M™ IOBAN™ 2 ANTIMICROBIAL INCISE DRAPE 6650EZ: Brand: IOBAN™ 2

## (undated) DEVICE — PK MINOR LAPAROTOMY 50

## (undated) DEVICE — PACEMAKER CDS: Brand: MEDLINE INDUSTRIES, INC.

## (undated) DEVICE — NDL HYPO ECLPS SFTY 25G 1 1/2IN

## (undated) DEVICE — GLV SURG SENSICARE PI PF LF 7 GRN STRL

## (undated) DEVICE — CODMAN® SURGICAL PATTIES 3/4" X 3/4" (1.91CM X 1.91CM): Brand: CODMAN®

## (undated) DEVICE — KT SURG TURNOVER 050

## (undated) DEVICE — NDL HYPO PRECISIONGLIDE REG 22G 1 1/2

## (undated) DEVICE — INTRO SHEATH PRELUDE SNAP .038 8.5F 13CM W/SDPRT LT/BLU

## (undated) DEVICE — PK ENDO GI 50

## (undated) DEVICE — APPL CHLORAPREP HI/LITE 26ML ORNG

## (undated) DEVICE — TRAP WIDEEYE POLYP

## (undated) DEVICE — SOL IRRIG H2O 1000ML STRL

## (undated) DEVICE — ADHS LIQ MASTISOL 2/3ML

## (undated) DEVICE — SOL IRRIG NACL 9PCT 1000ML BTL

## (undated) DEVICE — ADHS SKIN PREMIERPRO EXOFIN TOPICAL HI/VISC .5ML

## (undated) DEVICE — 3M™ PATIENT PLATE, CORDED, SPLIT, LARGE, 40 PER CASE, 1179: Brand: 3M™

## (undated) DEVICE — GLV SURG SIGNATURE ESSENTIAL PF LTX SZ8

## (undated) DEVICE — PROGRAMMER PUMP EXT FOR SYNCHROMEDII TH90T01

## (undated) DEVICE — IMMOB SHLDR CUT/AWAY UNIV

## (undated) DEVICE — TBG PENCL TELESCP MEGADYNE SMOKE EVAC 15FT

## (undated) DEVICE — ST ACC MICROPUNCTURE STFF/CANN PLAT/TP 4F 21G 40CM

## (undated) DEVICE — PROXIMATE RH ROTATING HEAD SKIN STAPLERS (35 WIDE) CONTAINS 35 STAINLESS STEEL STAPLES: Brand: PROXIMATE

## (undated) DEVICE — SPNG LAP PREWSH SFTPK 18X18IN STRL PK/5

## (undated) DEVICE — UNDYED BRAIDED (POLYGLACTIN 910), SYNTHETIC ABSORBABLE SUTURE: Brand: COATED VICRYL

## (undated) DEVICE — SPNG GZ AVANT 6PLY 4X4IN STRL PK/2

## (undated) DEVICE — ELECTRD DEFIB M/FUNC PROPADZ RADIOL 2PK

## (undated) DEVICE — SUT ETHLN FS1 4/0 18IN 1629H BX/36

## (undated) DEVICE — GLV SURG BIOGEL LTX PF 7

## (undated) DEVICE — SUT VIC 2/0 SH 27IN

## (undated) DEVICE — SUT VIC 2/0 CT1 27IN J339H BX/36

## (undated) DEVICE — SUT VIC 3/0 SH CR8 18IN J864D

## (undated) DEVICE — SINGLE-USE BIOPSY FORCEPS: Brand: RADIAL JAW 4

## (undated) DEVICE — GOWN,REINFORCE,POLY,SIRUS,BREATH SLV,XLG: Brand: MEDLINE

## (undated) DEVICE — DRP C/ARMOR

## (undated) DEVICE — PAPR PRNT PK SONY W RIBN UPC55

## (undated) DEVICE — VIOLET BRAIDED (POLYGLACTIN 910), SYNTHETIC ABSORBABLE SUTURE: Brand: COATED VICRYL

## (undated) DEVICE — GLV SURG SIGNATURE ESSENTIAL PF LTX SZ6.5

## (undated) DEVICE — RADIFOCUS GLIDEWIRE: Brand: GLIDEWIRE

## (undated) DEVICE — CABL BIPOL W/ALLGTR CLIP/SM 12FT

## (undated) DEVICE — DRP C/ARM 41X74IN

## (undated) DEVICE — SNAR POLYP CAPTIVATOR MICROHEX 13 240CM

## (undated) DEVICE — BITEBLOCK ENDO W/STRAP 60F A/ LF DISP

## (undated) DEVICE — PLASMABLADE PS200-040 4.0: Brand: PLASMABLADE™

## (undated) DEVICE — SYR ANGIO PALM CONTRL RA 10ML

## (undated) DEVICE — GLV SURG DERMASSURE GRN LF PF 8.5

## (undated) DEVICE — PK BASIC SPINE 50

## (undated) DEVICE — DRAPE SHEET ULTRAGARD: Brand: MEDLINE

## (undated) DEVICE — 400ML COMPACT EVACUATOR KIT, 1/8" PVC WITH TROCAR: Brand: HEMOVAC® WOUND DRAINAGE SYSTEM

## (undated) DEVICE — CATH GUIDE RIGHTSITE C315HIS-02